# Patient Record
Sex: FEMALE | Race: WHITE | NOT HISPANIC OR LATINO | Employment: OTHER | URBAN - METROPOLITAN AREA
[De-identification: names, ages, dates, MRNs, and addresses within clinical notes are randomized per-mention and may not be internally consistent; named-entity substitution may affect disease eponyms.]

---

## 2017-01-06 ENCOUNTER — RADIANT APPOINTMENT (OUTPATIENT)
Dept: GENERAL RADIOLOGY | Facility: CLINIC | Age: 72
End: 2017-01-06
Payer: COMMERCIAL

## 2017-01-06 ENCOUNTER — OFFICE VISIT (OUTPATIENT)
Dept: FAMILY MEDICINE | Facility: CLINIC | Age: 72
End: 2017-01-06
Payer: COMMERCIAL

## 2017-01-06 VITALS
SYSTOLIC BLOOD PRESSURE: 132 MMHG | RESPIRATION RATE: 16 BRPM | HEART RATE: 88 BPM | TEMPERATURE: 98.4 F | BODY MASS INDEX: 33.29 KG/M2 | HEIGHT: 64 IN | DIASTOLIC BLOOD PRESSURE: 74 MMHG | OXYGEN SATURATION: 94 % | WEIGHT: 195 LBS

## 2017-01-06 DIAGNOSIS — J18.9 PNEUMONIA OF LEFT LOWER LOBE DUE TO INFECTIOUS ORGANISM: Primary | ICD-10-CM

## 2017-01-06 DIAGNOSIS — R05.9 COUGH: ICD-10-CM

## 2017-01-06 PROCEDURE — 99214 OFFICE O/P EST MOD 30 MIN: CPT | Performed by: PHYSICIAN ASSISTANT

## 2017-01-06 PROCEDURE — 71020 XR CHEST 2 VW: CPT

## 2017-01-06 RX ORDER — BENZONATATE 200 MG/1
200 CAPSULE ORAL 3 TIMES DAILY PRN
Qty: 21 CAPSULE | Refills: 0 | Status: SHIPPED | OUTPATIENT
Start: 2017-01-06 | End: 2017-01-17

## 2017-01-06 RX ORDER — AZITHROMYCIN 250 MG/1
TABLET, FILM COATED ORAL
Qty: 6 TABLET | Refills: 0 | Status: SHIPPED | OUTPATIENT
Start: 2017-01-06 | End: 2017-01-17

## 2017-01-06 RX ORDER — ALBUTEROL SULFATE 90 UG/1
2 AEROSOL, METERED RESPIRATORY (INHALATION) EVERY 6 HOURS PRN
Qty: 1 INHALER | Refills: 0 | Status: SHIPPED | OUTPATIENT
Start: 2017-01-06 | End: 2017-01-17

## 2017-01-06 RX ORDER — CODEINE PHOSPHATE AND GUAIFENESIN 10; 100 MG/5ML; MG/5ML
2 SOLUTION ORAL EVERY 4 HOURS PRN
Qty: 120 ML | Refills: 0 | Status: SHIPPED | OUTPATIENT
Start: 2017-01-06 | End: 2017-01-17

## 2017-01-06 NOTE — NURSING NOTE
"Chief Complaint   Patient presents with     URI       Initial /74 mmHg  Pulse 88  Temp(Src) 98.4  F (36.9  C)  Resp 16  Ht 5' 4\" (1.626 m)  Wt 195 lb (88.451 kg)  BMI 33.46 kg/m2  SpO2 94% Estimated body mass index is 33.46 kg/(m^2) as calculated from the following:    Height as of this encounter: 5' 4\" (1.626 m).    Weight as of this encounter: 195 lb (88.451 kg).  BP completed using cuff size: daylin. KASSANDRA Perla LPN      "

## 2017-01-06 NOTE — PATIENT INSTRUCTIONS
Treating Pneumonia  Pneumonia is an infection of one or both of the lungs. Pneumonia:    Is usually caused by a virus    Can be very serious, especially in infants, young children, and older adults. And also in those with other long-term health problems or weakened immune systems    Is sometimes treated at home and sometimes in the hospital    Antibiotic Medications  Antibiotics may be prescribed or administered for pneumonia caused by bacteria. They may be pills or oral medications, or shots or injections. Or they may be given intravenously (IV) or into the vein. If you are taking oral medications at home:    Fill your prescription and start taking your medication as soon as you can.    You will likely start to feel better in a day or two, but don t stop taking the antibiotic.    Use a pill organizer to help you remember to take your medication.    Let your health care provider know if you have side effects.    Take your medication exactly as directed on the label. Talk to your pharmacist if you have any questions.  Antiviral Medications  Antiviral medication may be prescribed or given for pneumonia cause by a virus. For example, antiviral medication may be prescribed for pneumonia caused by the flu virus. Antibiotics do not work against viruses. If you are taking antiviral medication at home:    Fill your prescription and start taking your medication as soon as you can.    Talk with your provider or pharmacist about possible side effects.    Take the medication exactly as instructed.  To Relieve Symptoms  There are many medications that can help relieve symptoms of pneumonia. Some are prescription and some are over-the-counter.  Your health care provider may recommend:    Acetaminophen or ibuprofen to lower your fever and to lessen headache or other pain    Cough medication to loosen mucus or to reduce coughing  Make sure you check with your health care provider or pharmacist before taking any over-the-counter  medications.  Special Treatments  If you are hospitalized for pneumonia, you may have other therapies, including:    Inhaled medications to help with breathing or chest congestion    Supplemental oxygen to increase low oxygen levels  Drink Fluids and Eat Healthy  You should eat healthy to help your body fight the infection and drink a lot of fluids to replace fluids lost from fever and to help loosen mucus in the chest.    Diet. Make health food choices, including fruits and vegetables, lean meats and other proteins, 100% whole grain and low- or no-fat dairy products.    Fluids. Drinks at least 6-8 tall glasses a day. Water and 100% fruit or vegetable juice are best.  Get Plenty of Rest and Sleep  You may be more tired than usual for a while. It is important to get enough sleep at night. And, it's important to rest during the day. Talk with your health care provider if coughing or other symptoms are interfering with your sleep.  Preventing the Spread of Germs  The best thing you can do to prevent spreading germs is to wash your hands often. You should:    Rub your hand with soap and water for 20 to 30 seconds.    Clean in between your fingers, the backs of your hands, and around your nails.    Dry your hands on a separate towel or use paper towels.  You should also:    Keep alcohol-based hand  nearby.    Make sure you also clean surfaces that you touch. Use a product that kills all types of germs.    Stay away from others until you are feeling better.  When to Call Your Health Care Provider  Call your health care provider if you have any of these:    Symptoms get worse    Fever continues    Shortness of breath gets worse    Increased mucus or mucus that is darker in color    Coughing gets worse    Lips or fingers are bluish in color    Side effects from your medication     [id number] [employer]. [address] [place] [address], [place] [address]. All rights reserved. This information is not intended as  a substitute for professional medical care. Always follow your healthcare professional's instructions.

## 2017-01-06 NOTE — PROGRESS NOTES
SUBJECTIVE:                                                    Kavitha Headley is a 71 year old female who presents to clinic today for the following health issues:      Acute Illness   Acute illness concerns: uri  Onset: 1 week     Fever: YES- not sure - low grade    Chills/Sweats: YES- sweaty    Headache (location?): YES    Sinus Pressure:YES    Conjunctivitis:  YES- pressure    Ear Pain: no    Rhinorrhea: no    Congestion: YES    Sore Throat: no     Cough: YES-productive of green sputum    Wheeze: YES    Decreased Appetite: YES    Nausea: no    Vomiting: no    Diarrhea:  no    Dysuria/Freq.: no    Fatigue/Achiness: no    Sick/Strep Exposure: no     Therapies Tried and outcome: otc decongestant           -------------------------------------    Problem list and histories reviewed & adjusted, as indicated.  Additional history: as documented    Patient Active Problem List   Diagnosis     Hyperlipidemia LDL goal <130     Spinal stenosis     Chronic insomnia     Alcohol abuse     CKD (chronic kidney disease) stage 3, GFR 30-59 ml/min     Hip joint replacement status     Knee joint replacement status     Chronic pain syndrome     Bariatric surgery status     Personal history of urinary calculi     Macrocytic anemia     Anxiety     Aortic stenosis     Left-sided low back pain without sciatica     ACP (advance care planning)     PAC (premature atrial contraction)     Gastroesophageal reflux disease, esophagitis presence not specified     Controlled substance agreement signed     Seasonal affective disorder (H)     HTN, goal below 140/90     Past Surgical History   Procedure Laterality Date     Hysterectomy vaginal, bilateral salpingo-oopherectomy, combined  1998     due to myoma and bleeding     C gastric bypass,obese<100cm [id number]  1996     Laparotomy, lysis adhesions, combined  3/2004     lysis adhesions, ventral hernia repair, appendectomy incidentally     Appendectomy  3/2004     incidental     Joint replacement,  hip rt/lt  4/2004     right total hip arthroplasty     C total knee arthroplasty  12/2005     left      Cystoscopy, lithotripsy, combined  6/2006     Left extracorporeal shock wave lithotripsy, cystoscopy, left ureteral stent placement.     Cystoscopy, remove stent(s), combined  7/2006     Cystoscopy, removal of left ureteral stent, retrograde pyelography, flexible and rigid ureteroscopy and holmium laser lithotripsy, basket removal of stone fragments, ureteral stent placement.      C mediastinoscopy w or wo biopsy  2/2008     Videomediastinoscopy and, for mediastinal adenopathy -reactive lymphoid hyperplasia     Lymph node biopsy  4/2008     right axillary, reactive follicular and paracortical hyperplasia.     Carpal tunnel release rt/lt  10/2010     Carpometacarpal excisional arthroplasty with a fascial autograft and APL suspension sling (36851). 2. Left thumb metacarpophalangeal joint fusion with autologous bone graft ([id number]). 3. Left endoscopic carpal tunnel release      Cholecystectomy, laporoscopic  11/2010     Cholecystectomy, Laparoscopic     C repair of rectocele  3/2012     Hernia repair  4/2012     bilateral augmentation mastopexy, ventral hernia repair, and medial thigh liposuction on 04/06/2012.      Mammoplasty augmentation bilateral  4/2012     Revise reconstructed breast  6/7/2012     Left breast capsulotomy.      Cystocele repair  11/2012     davinci laparoscopic sacrocolpopexy, enterocele repair, lysis of adhesions, placement of retropubic mid urethral sling, cystoscopy     Colonoscopy N/A 9/8/2016     Procedure: COMBINED COLONOSCOPY, SINGLE OR MULTIPLE BIOPSY/POLYPECTOMY BY BIOPSY;  Surgeon: Moe Barlow MD;  Location:  GI       Social History   Substance Use Topics     Smoking status: Never Smoker      Smokeless tobacco: Never Used     Alcohol Use: 0.0 oz/week     0 Standard drinks or equivalent per week      Comment: 2-3 drinks per day     Family History   Problem Relation Age of Onset      Substance Abuse Father      CANCER Father      throat and lung mets     DIABETES No family hx of      Coronary Artery Disease No family hx of      CEREBROVASCULAR DISEASE No family hx of          Current Outpatient Prescriptions   Medication Sig Dispense Refill     azithromycin (ZITHROMAX) 250 MG tablet Two tablets first day, then one tablet daily for four days. 6 tablet 0     albuterol (PROAIR HFA/PROVENTIL HFA/VENTOLIN HFA) 108 (90 BASE) MCG/ACT Inhaler Inhale 2 puffs into the lungs every 6 hours as needed for shortness of breath / dyspnea or wheezing 1 Inhaler 0     guaiFENesin-codeine (ROBITUSSIN AC) 100-10 MG/5ML SOLN solution Take 10 mLs by mouth every 4 hours as needed for cough 120 mL 0     benzonatate (TESSALON) 200 MG capsule Take 1 capsule (200 mg) by mouth 3 times daily as needed for cough 21 capsule 0     temazepam (RESTORIL) 15 MG capsule TAKE 1 CAPSULES BY MOUTH AT BETIME AS NEEDED TO SLEEP. MUST LAST 30 DAYS. 30 capsule 5     mirtazapine (REMERON) 45 MG tablet Take 1 tablet (45 mg) by mouth At Bedtime 90 tablet 1     traMADol (ULTRAM) 50 MG tablet TAKE 1 TABLET BY MOUTH TWICE DAILY AS NEEDED FOR MODERATE PAIN 60 tablet 3     naproxen (NAPROSYN) 500 MG tablet Take 1 tablet (500 mg) by mouth 2 times daily as needed for moderate pain 90 tablet 1     metoprolol (TOPROL XL) 25 MG 24 hr tablet Take 1 tablet (25 mg) by mouth daily 90 tablet 3     atorvastatin (LIPITOR) 40 MG tablet Take 1 tablet (40 mg) by mouth daily 90 tablet 1     gabapentin (NEURONTIN) 300 MG capsule Take 1 capsule (300 mg) by mouth 2 times daily 180 capsule 1     traZODone (DESYREL) 100 MG tablet Take 1 tablet (100 mg) by mouth At Bedtime 90 tablet 1     Multiple Vitamins-Minerals (CENTRUM SILVER) per tablet Take 1 tablet by mouth daily       aspirin 81 MG tablet Take 81 mg by mouth daily       valACYclovir (VALTREX) 1000 mg tablet 2 tabs every 12 hrs x 1 day only for outbreaks of cold sores 8 tablet 3     Allergies   Allergen  "Reactions     Bactrim [Sulfamethoxazole W/Trimethoprim] Hives     Codeine Itching     NAUSEA     Morphine Itching     NAUSEA     Labs reviewed in EPIC  Problem list, Medication list, Allergies, and Medical/Social/Surgical histories reviewed in [facility] and updated as appropriate.    Social History     Social History     Marital Status:      Spouse Name: Mt     Number of Children: 4     Years of Education: 18     Occupational History     nurse            Social History Main Topics     Smoking status: Never Smoker      Smokeless tobacco: Never Used     Alcohol Use: 0.0 oz/week     0 Standard drinks or equivalent per week      Comment: 2-3 drinks per day     Drug Use: No     Sexual Activity:     Partners: Male     Other Topics Concern     Blood Transfusions No     Caffeine Concern Yes     1-2 cups per day      Occupational Exposure Yes     blood     Hobby Hazards No     Sleep Concern Yes     Stress Concern Yes     Weight Concern Yes     gastric  byepass     Special Diet No     Back Care No     Exercise Yes     walk, swin     Bike Helmet No     Seat Belt Yes     Self-Exams Yes     Social History Narrative       10 point review of systems negative other than symptoms noted above.   Constitutional, HEENT, CV, pulmonary, GI, , MS, Endo, Psych systems are all negative, except as otherwise noted.       OBJECTIVE:  /74 mmHg  Pulse 88  Temp(Src) 98.4  F (36.9  C)  Resp 16  Ht 5' 4\" (1.626 m)  Wt 195 lb (88.451 kg)  BMI 33.46 kg/m2  SpO2 94%  CONSTITUTIONAL: Alert, well-nourished, well-groomed, NAD  RESP: Very noisy lung exam in all fields; LLL with crackles.   CV: HRRR, normal S1, S2. No MRG. No peripheral edema.  DERM: No rashes or suspicious lesions  Head: Normocephalic, atraumatic.  Eyes: Conjunctiva clear, non icteric. PERRLA.  Ears: External ears and TMs normal BL.  Nose: Septum midline, nasal mucosa pink with clear drainage.   Mouth / Throat: Normal dentition.  No oral lesions. Pharynx non " erythematous, tonsils without hypertrophy.  Neck: Supple, no enlarged LN, trachea midline.      Diagnostic Tests:  CHEST TWO VIEWS   1/6/2017 4:35 PM       HISTORY: Cough.     COMPARISON: 8/2/2016.                                                                       IMPRESSION: Cardiomegaly, unchanged. No evidence for congestive heart  failure or infiltrate. No pleural effusions. Minimal degenerative  changes noted in spine.     MARKOS KING MD    ASSESSMENT/PLAN:  (J18.9) Pneumonia of left lower lobe due to infectious organism  (primary encounter diagnosis)  Comment: Reviewed xray with patient. No obvious infiltrate, but based on patient's lung exam and presentation, I will treat her for pneumonia.   CURB-65 score = 1, based on patient age - appropriate for outpatient tx.   However, if any worsening or concerns, she will be seen in UC or ER over the weekend.   Plan: azithromycin (ZITHROMAX) 250 MG tablet,         albuterol (PROAIR HFA/PROVENTIL HFA/VENTOLIN         HFA) 108 (90 BASE) MCG/ACT Inhaler,         guaiFENesin-codeine (ROBITUSSIN AC) 100-10         MG/5ML SOLN solution, benzonatate (TESSALON)         200 MG capsule            (R05) Cough  Comment:   Plan: XR Chest 2 Views             FOLLOW-UP: Routinely and sooner as needed.  The patient agrees with this assessment and plan and agrees to call or return to the clinic with any questions or concerns or if their condition worsens.    NITZA Moran, PACastilloC  [facility]

## 2017-01-06 NOTE — MR AVS SNAPSHOT
After Visit Summary   1/6/2017    Kavitha Headley    MRN: [id number]           Patient Information     Date Of Birth          1945        Visit Information        Provider Department      1/6/2017 3:40 PM Yoon Peterson PA-C [facility] [place]        Today's Diagnoses     Cough    -  1     Pneumonia of left lower lobe due to infectious organism           Care Instructions      Treating Pneumonia  Pneumonia is an infection of one or both of the lungs. Pneumonia:    Is usually caused by a virus    Can be very serious, especially in infants, young children, and older adults. And also in those with other long-term health problems or weakened immune systems    Is sometimes treated at home and sometimes in the hospital    Antibiotic Medications  Antibiotics may be prescribed or administered for pneumonia caused by bacteria. They may be pills or oral medications, or shots or injections. Or they may be given intravenously (IV) or into the vein. If you are taking oral medications at home:    Fill your prescription and start taking your medication as soon as you can.    You will likely start to feel better in a day or two, but don t stop taking the antibiotic.    Use a pill organizer to help you remember to take your medication.    Let your health care provider know if you have side effects.    Take your medication exactly as directed on the label. Talk to your pharmacist if you have any questions.  Antiviral Medications  Antiviral medication may be prescribed or given for pneumonia cause by a virus. For example, antiviral medication may be prescribed for pneumonia caused by the flu virus. Antibiotics do not work against viruses. If you are taking antiviral medication at home:    Fill your prescription and start taking your medication as soon as you can.    Talk with your provider or pharmacist about possible side effects.    Take the medication exactly as instructed.  To Relieve  Symptoms  There are many medications that can help relieve symptoms of pneumonia. Some are prescription and some are over-the-counter.  Your health care provider may recommend:    Acetaminophen or ibuprofen to lower your fever and to lessen headache or other pain    Cough medication to loosen mucus or to reduce coughing  Make sure you check with your health care provider or pharmacist before taking any over-the-counter medications.  Special Treatments  If you are hospitalized for pneumonia, you may have other therapies, including:    Inhaled medications to help with breathing or chest congestion    Supplemental oxygen to increase low oxygen levels  Drink Fluids and Eat Healthy  You should eat healthy to help your body fight the infection and drink a lot of fluids to replace fluids lost from fever and to help loosen mucus in the chest.    Diet. Make health food choices, including fruits and vegetables, lean meats and other proteins, 100% whole grain and low- or no-fat dairy products.    Fluids. Drinks at least 6-8 tall glasses a day. Water and 100% fruit or vegetable juice are best.  Get Plenty of Rest and Sleep  You may be more tired than usual for a while. It is important to get enough sleep at night. And, it's important to rest during the day. Talk with your health care provider if coughing or other symptoms are interfering with your sleep.  Preventing the Spread of Germs  The best thing you can do to prevent spreading germs is to wash your hands often. You should:    Rub your hand with soap and water for 20 to 30 seconds.    Clean in between your fingers, the backs of your hands, and around your nails.    Dry your hands on a separate towel or use paper towels.  You should also:    Keep alcohol-based hand  nearby.    Make sure you also clean surfaces that you touch. Use a product that kills all types of germs.    Stay away from others until you are feeling better.  When to Call Your Health Care  Provider  Call your health care provider if you have any of these:    Symptoms get worse    Fever continues    Shortness of breath gets worse    Increased mucus or mucus that is darker in color    Coughing gets worse    Lips or fingers are bluish in color    Side effects from your medication     5094-5794 [employer]. [address] [place] [address] 18124. All rights reserved. This information is not intended as a substitute for professional medical care. Always follow your healthcare professional's instructions.              Follow-ups after your visit        Who to contact     If you have questions or need follow up information about today's clinic visit or your schedule please contact [facility] [place] directly at [phone].  Normal or non-critical lab and imaging results will be communicated to you by MyChart, letter or phone within 4 business days after the clinic has received the results. If you do not hear from us within 7 days, please contact the clinic through [employer] or phone. If you have a critical or abnormal lab result, we will notify you by phone as soon as possible.  Submit refill requests through [employer] or call your pharmacy and they will forward the refill request to us. Please allow 3 business days for your refill to be completed.          Additional Information About Your Visit        [employer] Information     [employer] gives you secure access to your electronic health record. If you see a primary care provider, you can also send messages to your care team and make appointments. If you have questions, please call your primary care clinic.  If you do not have a primary care provider, please call [phone] and they will assist you.        Care EveryWhere ID     This is your Care EveryWhere ID. This could be used by other organizations to access your [place] medical records  [id number]        Your Vitals Were     Pulse Temperature Respirations Height BMI (Body Mass  "Index) Pulse Oximetry    88 98.4  F (36.9  C) 16 5' 4\" (1.626 m) 33.46 kg/m2 94%       Blood Pressure from Last 3 Encounters:   01/06/17 132/74   12/12/16 125/82   10/06/16 116/64    Weight from Last 3 Encounters:   01/06/17 195 lb (88.451 kg)   12/12/16 188 lb (85.276 kg)   10/06/16 180 lb (81.647 kg)                 Today's Medication Changes          These changes are accurate as of: 1/6/17  4:22 PM.  If you have any questions, ask your nurse or doctor.               Start taking these medicines.        Dose/Directions    albuterol 108 (90 BASE) MCG/ACT Inhaler   Commonly known as:  PROAIR HFA/PROVENTIL HFA/VENTOLIN HFA   Used for:  Pneumonia of left lower lobe due to infectious organism   Started by:  Yoon Peterson PA-C        Dose:  2 puff   Inhale 2 puffs into the lungs every 6 hours as needed for shortness of breath / dyspnea or wheezing   Quantity:  1 Inhaler   Refills:  0       azithromycin 250 MG tablet   Commonly known as:  ZITHROMAX   Used for:  Pneumonia of left lower lobe due to infectious organism   Started by:  Yoon Peterson PA-C        Two tablets first day, then one tablet daily for four days.   Quantity:  6 tablet   Refills:  0       benzonatate 200 MG capsule   Commonly known as:  TESSALON   Used for:  Pneumonia of left lower lobe due to infectious organism   Started by:  Yoon Peterson PA-C        Dose:  200 mg   Take 1 capsule (200 mg) by mouth 3 times daily as needed for cough   Quantity:  21 capsule   Refills:  0       guaiFENesin-codeine 100-10 MG/5ML Soln solution   Commonly known as:  ROBITUSSIN AC   Used for:  Pneumonia of left lower lobe due to infectious organism   Started by:  Yoon Peterson PA-C        Dose:  2 tsp.   Take 10 mLs by mouth every 4 hours as needed for cough   Quantity:  120 mL   Refills:  0            Where to get your medicines      These medications were sent to [facility] [place] - [address]  [address], [place] [address]     Phone:  [phone]    - albuterol 108 (90 BASE) MCG/ACT Inhaler  - azithromycin 250 MG tablet  - benzonatate 200 MG capsule      Some of these will need a paper prescription and others can be bought over the counter.  Ask your nurse if you have questions.     Bring a paper prescription for each of these medications    - guaiFENesin-codeine 100-10 MG/5ML Soln solution             Primary Care Provider Office Phone # Fax #    Alison ePna [phone]       [address]  [place] [address]        Thank you!     Thank you for choosing [facility]  for your care. Our goal is always to provide you with excellent care. Hearing back from our patients is one way we can continue to improve our services. Please take a few minutes to complete the written survey that you may receive in the mail after your visit with us. Thank you!             Your Updated Medication List - Protect others around you: Learn how to safely use, store and throw away your medicines at www.disposemymeds.org.          This list is accurate as of: 1/6/17  4:22 PM.  Always use your most recent med list.                   Brand Name Dispense Instructions for use    albuterol 108 (90 BASE) MCG/ACT Inhaler    PROAIR HFA/PROVENTIL HFA/VENTOLIN HFA    1 Inhaler    Inhale 2 puffs into the lungs every 6 hours as needed for shortness of breath / dyspnea or wheezing       aspirin 81 MG tablet      Take 81 mg by mouth daily       atorvastatin 40 MG tablet    LIPITOR    90 tablet    Take 1 tablet (40 mg) by mouth daily       azithromycin 250 MG tablet    ZITHROMAX    6 tablet    Two tablets first day, then one tablet daily for four days.       benzonatate 200 MG capsule    TESSALON    21 capsule    Take 1 capsule (200 mg) by mouth 3 times daily as needed for cough       CENTRUM SILVER per tablet      Take 1 tablet  by mouth daily       gabapentin 300 MG capsule    NEURONTIN    180 capsule    Take 1 capsule (300 mg) by mouth 2 times daily       guaiFENesin-codeine 100-10 MG/5ML Soln solution    ROBITUSSIN AC    120 mL    Take 10 mLs by mouth every 4 hours as needed for cough       metoprolol 25 MG 24 hr tablet    TOPROL XL    90 tablet    Take 1 tablet (25 mg) by mouth daily       mirtazapine 45 MG tablet    REMERON    90 tablet    Take 1 tablet (45 mg) by mouth At Bedtime       naproxen 500 MG tablet    NAPROSYN    90 tablet    Take 1 tablet (500 mg) by mouth 2 times daily as needed for moderate pain       temazepam 15 MG capsule    RESTORIL    30 capsule    TAKE 1 CAPSULES BY MOUTH AT BETIME AS NEEDED TO SLEEP. MUST LAST 30 DAYS.       traMADol 50 MG tablet    ULTRAM    60 tablet    TAKE 1 TABLET BY MOUTH TWICE DAILY AS NEEDED FOR MODERATE PAIN       traZODone 100 MG tablet    DESYREL    90 tablet    Take 1 tablet (100 mg) by mouth At Bedtime       valACYclovir 1000 mg tablet    VALTREX    8 tablet    2 tabs every 12 hrs x 1 day only for outbreaks of cold sores

## 2017-01-17 ENCOUNTER — OFFICE VISIT (OUTPATIENT)
Dept: FAMILY MEDICINE | Facility: CLINIC | Age: 72
End: 2017-01-17
Payer: COMMERCIAL

## 2017-01-17 VITALS
HEART RATE: 82 BPM | WEIGHT: 194 LBS | DIASTOLIC BLOOD PRESSURE: 78 MMHG | HEIGHT: 64 IN | BODY MASS INDEX: 33.12 KG/M2 | SYSTOLIC BLOOD PRESSURE: 118 MMHG | TEMPERATURE: 99.3 F | OXYGEN SATURATION: 97 %

## 2017-01-17 DIAGNOSIS — Z12.31 ENCOUNTER FOR SCREENING MAMMOGRAM FOR BREAST CANCER: ICD-10-CM

## 2017-01-17 DIAGNOSIS — R05.9 COUGH: Primary | ICD-10-CM

## 2017-01-17 PROCEDURE — 99213 OFFICE O/P EST LOW 20 MIN: CPT | Performed by: FAMILY MEDICINE

## 2017-01-17 RX ORDER — CODEINE PHOSPHATE AND GUAIFENESIN 10; 100 MG/5ML; MG/5ML
2 SOLUTION ORAL EVERY 4 HOURS PRN
Qty: 120 ML | Refills: 0 | Status: SHIPPED | OUTPATIENT
Start: 2017-01-17 | End: 2017-05-09

## 2017-01-17 RX ORDER — METHYLPREDNISOLONE 4 MG
TABLET, DOSE PACK ORAL
Qty: 21 TABLET | Refills: 0 | Status: SHIPPED | OUTPATIENT
Start: 2017-01-17 | End: 2017-05-09

## 2017-01-17 NOTE — NURSING NOTE
"Chief Complaint   Patient presents with     URI       Initial /78 mmHg  Pulse 82  Temp(Src) 99.3  F (37.4  C) (Tympanic)  Ht 5' 4\" (1.626 m)  Wt 194 lb (87.998 kg)  BMI 33.28 kg/m2  SpO2 97% Estimated body mass index is 33.28 kg/(m^2) as calculated from the following:    Height as of this encounter: 5' 4\" (1.626 m).    Weight as of this encounter: 194 lb (87.998 kg)..  BP completed using cuff size: regular Sarah Severson, CMA  "

## 2017-01-17 NOTE — PROGRESS NOTES
SUBJECTIVE:                                                    Kavitha Headley is a 71 year old female who presents to clinic today for the following health issues:      Acute Illness   Acute illness concerns: Cough  Onset: 1 month     Fever: no     Chills/Sweats: YES    Headache (location?): no     Sinus Pressure:no    Conjunctivitis:  no    Ear Pain: YES- pressure/plugged feeling    Rhinorrhea: YES    Congestion: YES    Sore Throat: no      Cough: YES    Wheeze: YES    Decreased Appetite: YES    Nausea: no     Vomiting: no     Diarrhea:  no     Dysuria/Freq.: no     Fatigue/Achiness: YES    Sick/Strep Exposure: no      Therapies Tried and outcome: inhaler,  Z-pack, OTC meds. She was seen on 1/6/17. Some improvement   Coughs a lot of night.         Problem list and histories reviewed & adjusted, as indicated.  Additional history: as documented    Patient Active Problem List   Diagnosis     Hyperlipidemia LDL goal <130     Spinal stenosis     Chronic insomnia     Alcohol abuse     CKD (chronic kidney disease) stage 3, GFR 30-59 ml/min     Hip joint replacement status     Knee joint replacement status     Chronic pain syndrome     Bariatric surgery status     Personal history of urinary calculi     Macrocytic anemia     Anxiety     Aortic stenosis     Left-sided low back pain without sciatica     ACP (advance care planning)     PAC (premature atrial contraction)     Gastroesophageal reflux disease, esophagitis presence not specified     Controlled substance agreement signed     Seasonal affective disorder (H)     HTN, goal below 140/90     Past Surgical History   Procedure Laterality Date     Hysterectomy vaginal, bilateral salpingo-oopherectomy, combined  1998     due to myoma and bleeding     C gastric bypass,obese<100cm kajal-[id number]  1996     Laparotomy, lysis adhesions, combined  3/2004     lysis adhesions, ventral hernia repair, appendectomy incidentally     Appendectomy  3/2004     incidental     Joint  replacement, hip rt/lt  4/2004     right total hip arthroplasty     C total knee arthroplasty  12/2005     left      Cystoscopy, lithotripsy, combined  6/2006     Left extracorporeal shock wave lithotripsy, cystoscopy, left ureteral stent placement.     Cystoscopy, remove stent(s), combined  7/2006     Cystoscopy, removal of left ureteral stent, retrograde pyelography, flexible and rigid ureteroscopy and holmium laser lithotripsy, basket removal of stone fragments, ureteral stent placement.      C mediastinoscopy w or wo biopsy  2/2008     Videomediastinoscopy and, for mediastinal adenopathy -reactive lymphoid hyperplasia     Lymph node biopsy  4/2008     right axillary, reactive follicular and paracortical hyperplasia.     Carpal tunnel release rt/lt  10/2010     Carpometacarpal excisional arthroplasty with a fascial autograft and APL suspension sling (37355). 2. Left thumb metacarpophalangeal joint fusion with autologous bone graft (27590). 3. Left endoscopic carpal tunnel release      Cholecystectomy, laporoscopic  11/2010     Cholecystectomy, Laparoscopic     C repair of rectocele  3/2012     Hernia repair  4/2012     bilateral augmentation mastopexy, ventral hernia repair, and medial thigh liposuction on 04/06/2012.      Mammoplasty augmentation bilateral  4/2012     Revise reconstructed breast  6/7/2012     Left breast capsulotomy.      Cystocele repair  11/2012     davinci laparoscopic sacrocolpopexy, enterocele repair, lysis of adhesions, placement of retropubic mid urethral sling, cystoscopy     Colonoscopy N/A 9/8/2016     Procedure: COMBINED COLONOSCOPY, SINGLE OR MULTIPLE BIOPSY/POLYPECTOMY BY BIOPSY;  Surgeon: Moe Barlow MD;  Location:  GI       Social History   Substance Use Topics     Smoking status: Never Smoker      Smokeless tobacco: Never Used     Alcohol Use: 0.0 oz/week     0 Standard drinks or equivalent per week      Comment: 2-3 drinks per day     Family History   Problem Relation Age  of Onset     Substance Abuse Father      CANCER Father      throat and lung mets     DIABETES No family hx of      Coronary Artery Disease No family hx of      CEREBROVASCULAR DISEASE No family hx of          Current Outpatient Prescriptions   Medication Sig Dispense Refill     methylPREDNISolone (MEDROL DOSEPAK) 4 MG tablet Follow package instructions 21 tablet 0     guaiFENesin-codeine (ROBITUSSIN AC) 100-10 MG/5ML SOLN solution Take 10 mLs by mouth every 4 hours as needed for cough 120 mL 0     temazepam (RESTORIL) 15 MG capsule TAKE 1 CAPSULES BY MOUTH AT BETIME AS NEEDED TO SLEEP. MUST LAST 30 DAYS. 30 capsule 5     mirtazapine (REMERON) 45 MG tablet Take 1 tablet (45 mg) by mouth At Bedtime 90 tablet 1     traMADol (ULTRAM) 50 MG tablet TAKE 1 TABLET BY MOUTH TWICE DAILY AS NEEDED FOR MODERATE PAIN 60 tablet 3     naproxen (NAPROSYN) 500 MG tablet Take 1 tablet (500 mg) by mouth 2 times daily as needed for moderate pain 90 tablet 1     metoprolol (TOPROL XL) 25 MG 24 hr tablet Take 1 tablet (25 mg) by mouth daily 90 tablet 3     atorvastatin (LIPITOR) 40 MG tablet Take 1 tablet (40 mg) by mouth daily 90 tablet 1     gabapentin (NEURONTIN) 300 MG capsule Take 1 capsule (300 mg) by mouth 2 times daily 180 capsule 1     traZODone (DESYREL) 100 MG tablet Take 1 tablet (100 mg) by mouth At Bedtime 90 tablet 1     Multiple Vitamins-Minerals (CENTRUM SILVER) per tablet Take 1 tablet by mouth daily       aspirin 81 MG tablet Take 81 mg by mouth daily       valACYclovir (VALTREX) 1000 mg tablet 2 tabs every 12 hrs x 1 day only for outbreaks of cold sores 8 tablet 3     Allergies   Allergen Reactions     Bactrim [Sulfamethoxazole W/Trimethoprim] Hives     Codeine Itching     NAUSEA     Morphine Itching     NAUSEA       ROS:  C: NEGATIVE for fever, chills, change in weight  INTEGUMENTARY/SKIN: NEGATIVE for worrisome rashes, moles or lesions  GI: NEGATIVE for nausea, abdominal pain, heartburn, or change in bowel  "habits    OBJECTIVE:                                                    /78 mmHg  Pulse 82  Temp(Src) 99.3  F (37.4  C) (Tympanic)  Ht 5' 4\" (1.626 m)  Wt 194 lb (87.998 kg)  BMI 33.28 kg/m2  SpO2 97%  Body mass index is 33.28 kg/(m^2).   GENERAL: healthy, alert, well nourished, well hydrated, no distress  HENT: ear canals- normal; TMs- normal; Nose- normal; Mouth- no ulcers, no lesions  NECK: no tenderness, no adenopathy, no asymmetry, no masses, no stiffness; thyroid- normal to palpation  RESP: lungs clear to auscultation - no rales, no rhonchi, no wheezes  CV: regular rates and rhythm, normal S1 S2, no S3 or S4 and no murmur, no click or rub -         ASSESSMENT/PLAN:                                                      1. Cough  No active signs of infection. No antibiotics indicated. Recommended trial of PO steroids to reduce inflammation.   Cough medication ordered. Instructed to stay well hydrated.   Tessalon was too pricey, so she never got it.   Albuterol inhaler is hard to use and she ran out already.  Z-pack course done already.     - methylPREDNISolone (MEDROL DOSEPAK) 4 MG tablet; Follow package instructions  Dispense: 21 tablet; Refill: 0  - guaiFENesin-codeine (ROBITUSSIN AC) 100-10 MG/5ML SOLN solution; Take 10 mLs by mouth every 4 hours as needed for cough  Dispense: 120 mL; Refill: 0    2. Encounter for screening mammogram for breast cancer  Screening mammogram ordered  - *MA Screening Digital Bilateral; Future      Follow up with Provider - if not improving or if any worsening is noted.      Alison Pena MD  [facility] [place]  "

## 2017-01-17 NOTE — MR AVS SNAPSHOT
After Visit Summary   1/17/2017    Kavitha Headley    MRN: [id number]           Patient Information     Date Of Birth          1945        Visit Information        Provider Department      1/17/2017 3:20 PM Alison Pena MD [facility] [place]        Today's Diagnoses     Cough    -  1     Encounter for screening mammogram for breast cancer            Follow-ups after your visit        Follow-up notes from your care team     Return in about 4 months (around 5/17/2017) for Physical Exam.      Future tests that were ordered for you today     Open Future Orders        Priority Expected Expires Ordered    *MA Screening Digital Bilateral Routine  1/17/2018 1/17/2017            Who to contact     If you have questions or need follow up information about today's clinic visit or your schedule please contact [facility] [place] directly at [phone].  Normal or non-critical lab and imaging results will be communicated to you by MyChart, letter or phone within 4 business days after the clinic has received the results. If you do not hear from us within 7 days, please contact the clinic through MyChart or phone. If you have a critical or abnormal lab result, we will notify you by phone as soon as possible.  Submit refill requests through [employer] or call your pharmacy and they will forward the refill request to us. Please allow 3 business days for your refill to be completed.          Additional Information About Your Visit        MyChart Information     [employer] gives you secure access to your electronic health record. If you see a primary care provider, you can also send messages to your care team and make appointments. If you have questions, please call your primary care clinic.  If you do not have a primary care provider, please call [phone] and they will assist you.        Care EveryWhere ID     This is your Care EveryWhere ID. This could be used by other organizations to  "access your [facility] medical records  [id number]        Your Vitals Were     Pulse Temperature Height BMI (Body Mass Index) Pulse Oximetry       82 99.3  F (37.4  C) (Tympanic) 5' 4\" (1.626 m) 33.28 kg/m2 97%        Blood Pressure from Last 3 Encounters:   01/17/17 118/78   01/06/17 132/74   12/12/16 125/82    Weight from Last 3 Encounters:   01/17/17 194 lb (87.998 kg)   01/06/17 195 lb (88.451 kg)   12/12/16 188 lb (85.276 kg)                 Today's Medication Changes          These changes are accurate as of: 1/17/17  4:01 PM.  If you have any questions, ask your nurse or doctor.               Start taking these medicines.        Dose/Directions    methylPREDNISolone 4 MG tablet   Commonly known as:  MEDROL DOSEPAK   Used for:  Cough   Started by:  Alison Pena MD        Follow package instructions   Quantity:  21 tablet   Refills:  0         Stop taking these medicines if you haven't already. Please contact your care team if you have questions.     albuterol 108 (90 BASE) MCG/ACT Inhaler   Commonly known as:  PROAIR HFA/PROVENTIL HFA/VENTOLIN HFA   Stopped by:  Alison Pena MD           benzonatate 200 MG capsule   Commonly known as:  TESSALON   Stopped by:  Alison Pena MD                Where to get your medicines      These medications were sent to [facility] [place] [address] [place] [address]     Phone:  [phone]    - methylPREDNISolone 4 MG tablet      Some of these will need a paper prescription and others can be bought over the counter.  Ask your nurse if you have questions.     Bring a paper prescription for each of these medications    - guaiFENesin-codeine 100-10 MG/5ML Soln solution             Primary Care Provider Office Phone # Fax #    Alison Pena [phone]       [address]  [place] [address]        Thank you!     Thank you for choosing [facility] [place]  for your care. Our goal is always to provide you with excellent care. Hearing back from our patients is one way we can continue to improve our services. Please take a few minutes to complete the written survey that you may receive in the mail after your visit with us. Thank you!             Your Updated Medication List - Protect others around you: Learn how to safely use, store and throw away your medicines at www.disposemymeds.org.          This list is accurate as of: 1/17/17  4:01 PM.  Always use your most recent med list.                   Brand Name Dispense Instructions for use    aspirin 81 MG tablet      Take 81 mg by mouth daily       atorvastatin 40 MG tablet    LIPITOR    90 tablet    Take 1 tablet (40 mg) by mouth daily       CENTRUM SILVER per tablet      Take 1 tablet by mouth daily       gabapentin 300 MG capsule    NEURONTIN    180 capsule    Take 1 capsule (300 mg) by mouth 2 times daily       guaiFENesin-codeine 100-10 MG/5ML Soln solution    ROBITUSSIN AC    120 mL    Take 10 mLs by mouth every 4 hours as needed for cough       methylPREDNISolone 4 MG tablet    MEDROL DOSEPAK    21 tablet    Follow package instructions       metoprolol 25 MG 24 hr tablet    TOPROL XL    90 tablet    Take 1 tablet (25 mg) by mouth daily       mirtazapine 45 MG tablet    REMERON    90 tablet    Take 1 tablet (45 mg) by mouth At Bedtime       naproxen 500 MG tablet    NAPROSYN    90 tablet    Take 1 tablet (500 mg) by mouth 2 times daily as needed for moderate pain       temazepam 15 MG capsule    RESTORIL    30 capsule    TAKE 1 CAPSULES BY MOUTH AT BETIME AS NEEDED TO SLEEP. MUST LAST 30 DAYS.       traMADol 50 MG tablet    ULTRAM    60 tablet    TAKE 1 TABLET BY MOUTH TWICE DAILY AS NEEDED FOR MODERATE PAIN       traZODone 100 MG tablet    DESYREL    90 tablet    Take 1 tablet (100 mg) by mouth At Bedtime       valACYclovir 1000 mg tablet    VALTREX    8 tablet    2 tabs every 12 hrs x 1 day  only for outbreaks of cold sores

## 2017-02-09 DIAGNOSIS — B00.9 HERPES SIMPLEX VIRUS INFECTION: Primary | ICD-10-CM

## 2017-02-09 RX ORDER — VALACYCLOVIR HYDROCHLORIDE 1 G/1
TABLET, FILM COATED ORAL
Qty: 8 TABLET | Refills: 0 | Status: SHIPPED | OUTPATIENT
Start: 2017-02-09 | End: 2017-05-09

## 2017-02-09 NOTE — TELEPHONE ENCOUNTER
Received an Escribe error- prescription did not go through-  Called [facility] and gave verbal order for valtrex.  Essence Rodas,WILMA  [facility]  [phone]

## 2017-02-09 NOTE — TELEPHONE ENCOUNTER
Valtrex for cold sore outbreak      Last Written Prescription Date: 7/2/15  Last Fill Quantity: 8, # refills: 3  Last Office Visit with [facility], [facility] or [facility] prescribing provider: 1/17/17        CREATININE   Date Value Ref Range Status   08/17/2016 0.80 0.52 - 1.04 mg/dL Final     appt and lab up to date. Refill request approved per [facility] protocol    Katharina Rock RN

## 2017-02-24 DIAGNOSIS — M54.50 CHRONIC LEFT-SIDED LOW BACK PAIN WITHOUT SCIATICA: ICD-10-CM

## 2017-02-24 DIAGNOSIS — G89.29 CHRONIC LEFT-SIDED LOW BACK PAIN WITHOUT SCIATICA: ICD-10-CM

## 2017-02-24 RX ORDER — GABAPENTIN 300 MG/1
300 CAPSULE ORAL 3 TIMES DAILY
Qty: 270 CAPSULE | Refills: 1 | Status: SHIPPED | OUTPATIENT
Start: 2017-02-24 | End: 2017-05-09

## 2017-02-24 NOTE — TELEPHONE ENCOUNTER
Patient called stating that she has been taking Gabapentin three times a day frequently instead of the twice daily as ordered    With taking the three times a day, has noted that pain is much better    Asking for new prescription for three times a day (only has three pills left), cannot get refill on current rx as written until 3/12.    rx pended, please edit    Triage:  Call patient at [phone] with response    WILMA Holguin

## 2017-04-20 DIAGNOSIS — I10 BENIGN HYPERTENSION: ICD-10-CM

## 2017-04-20 RX ORDER — FUROSEMIDE 20 MG
TABLET ORAL
Qty: 30 TABLET | Refills: 0 | Status: SHIPPED | OUTPATIENT
Start: 2017-04-20 | End: 2017-05-09

## 2017-04-20 NOTE — TELEPHONE ENCOUNTER
Please inquire if the patient is currently using the medication.  Please have her follow up for an annual physical exam with fasting labs. If she is running out of the medication, we can order 30 tablets.

## 2017-04-20 NOTE — TELEPHONE ENCOUNTER
Routing refill request to provider for review/approval because:  Labs out of range:  BRITTNEY Holguin RN

## 2017-04-20 NOTE — TELEPHONE ENCOUNTER
Spoke with patient, states she has been taking it every 3-4 days prn. Patient scheduled CPE for 5/4/17 and small refill given.   Mini Calero RN   [facility] - Triage

## 2017-05-04 ENCOUNTER — OFFICE VISIT (OUTPATIENT)
Dept: FAMILY MEDICINE | Facility: CLINIC | Age: 72
End: 2017-05-04

## 2017-05-04 DIAGNOSIS — Z53.9 ERRONEOUS ENCOUNTER--DISREGARD: Primary | ICD-10-CM

## 2017-05-04 NOTE — MR AVS SNAPSHOT
After Visit Summary   5/4/2017    Kavitha Headley    MRN: [id number]           Patient Information     Date Of Birth          1945        Visit Information        Provider Department      5/4/2017 1:00 PM Alison Pena MD [facility]        Today's Diagnoses     ERRONEOUS ENCOUNTER--DISREGARD    -  1       Follow-ups after your visit        Your next 10 appointments already scheduled     May 10, 2017 11:30 AM CDT   LAB with EC LAB   [facility]    [address] [facility] [id number] [phone]           OUTSIDE LABS:  Please include name of facility and Physician that is requesting outside labs be drawn.  Please indicate if labs are fasting or non-fasting on appt notes.  Be as specific as you can on which labs are being drawn.            May 12, 2017  1:15 PM CDT   MA SCREENING DIGITAL BILATERAL with SHBCMA1   [facility]    [address] [place] [address], [address]  [facility] [address] [phone]           Do not use any powder, lotion or deodorant under your arms or on your breast. If you do, we will ask you to remove it before your exam.  Wear comfortable, two-piece clothing.  If you have any allergies, tell your care team.  Bring any previous mammograms from other facilities or have them mailed to the breast center. This mammogram location, [facility], now offers 3D mammography. It doesn't replace a screening mammogram and can be done with a regular screening mammogram. It is optional and not all insurances will pay for it. 3D mammography is a special kind of mammogram that produces a three-dimensional image of the breast by using low dose-xrays. 3D allows the radiologist to see the breast tissue differently from 2D, which reduces the chance of repeat testing due to overlapping breast tissue. If you are interested in have a 3D  mammogram, please check with your insurance before you arrive for your exam. On the day of your exam you will be asked if you would like 3D imaging.            May 18, 2017  1:00 PM CDT   Ech Complete with ECECH1   [facility] [place] [facility]    [address] [facility]  [place] [id number] [phone]           1.  Please bring or wear a comfortable two-piece outfit. 2.  You may eat, drink and take your normal medicines. 3.  For any questions that cannot be answered, please contact the ordering physician            May 19, 2017  1:30 PM CDT   New Visit with Fam Sommer MD   [facility]    [address] [place], [place] [id number]  [facility] [address] [phone]              Future tests that were ordered for you today     Open Future Orders        Priority Expected Expires Ordered    Echocardiogram Complete Routine  5/9/2018 5/9/2017    MA Screen w Implants Digital Bilat Routine  5/9/2018 5/9/2017    Lipid Profile Routine  5/9/2018 5/9/2017    Comprehensive metabolic panel Routine  5/9/2018 5/9/2017            Who to contact     If you have questions or need follow up information about today's clinic visit or your schedule please contact [facility] directly at [phone].  Normal or non-critical lab and imaging results will be communicated to you by MyChart, letter or phone within 4 business days after the clinic has received the results. If you do not hear from us within 7 days, please contact the clinic through [employer] or phone. If you have a critical or abnormal lab result, we will notify you by phone as soon as possible.  Submit refill requests through [employer] or call your pharmacy and they will forward the refill request to us. Please allow 3 business days for your refill to be completed.          Additional Information About Your Visit        [employer] Information     [employer] gives  you secure access to your electronic health record. If you see a primary care provider, you can also send messages to your care team and make appointments. If you have questions, please call your primary care clinic.  If you do not have a primary care provider, please call [phone] and they will assist you.        Care EveryWhere ID     This is your Care EveryWhere ID. This could be used by other organizations to access your [facility] medical records  [id number]         Blood Pressure from Last 3 Encounters:   05/09/17 111/74   01/17/17 118/78   01/06/17 132/74    Weight from Last 3 Encounters:   05/09/17 187 lb 3.2 oz (84.9 kg)   01/17/17 194 lb (88 kg)   01/06/17 195 lb (88.5 kg)              Today, you had the following     No orders found for display       Primary Care Provider Office Phone # Fax #    Alison Pena [phone]       [facility] [place] [address] [place] [address] [address]  [place] [address]        Thank you!     Thank you for choosing [facility]  for your care. Our goal is always to provide you with excellent care. Hearing back from our patients is one way we can continue to improve our services. Please take a few minutes to complete the written survey that you may receive in the mail after your visit with us. Thank you!             Your Updated Medication List - Protect others around you: Learn how to safely use, store and throw away your medicines at www.disposemymeds.org.          This list is accurate as of: 5/4/17 11:59 PM.  Always use your most recent med list.                   Brand Name Dispense Instructions for use    aspirin 81 MG tablet      Take 81 mg by mouth daily       atorvastatin 40 MG tablet    LIPITOR    90 tablet    Take 1 tablet (40 mg) by mouth daily       CENTRUM SILVER per tablet      Take 1 tablet by mouth daily       metoprolol 25 MG 24 hr tablet    TOPROL XL    90 tablet    Take 1 tablet (25 mg) by mouth daily

## 2017-05-09 ENCOUNTER — OFFICE VISIT (OUTPATIENT)
Dept: FAMILY MEDICINE | Facility: CLINIC | Age: 72
End: 2017-05-09
Payer: COMMERCIAL

## 2017-05-09 VITALS
TEMPERATURE: 99.4 F | SYSTOLIC BLOOD PRESSURE: 111 MMHG | WEIGHT: 187.2 LBS | BODY MASS INDEX: 31.96 KG/M2 | OXYGEN SATURATION: 98 % | HEART RATE: 98 BPM | HEIGHT: 64 IN | DIASTOLIC BLOOD PRESSURE: 74 MMHG

## 2017-05-09 DIAGNOSIS — F51.04 CHRONIC INSOMNIA: ICD-10-CM

## 2017-05-09 DIAGNOSIS — B00.9 HERPES SIMPLEX VIRUS INFECTION: ICD-10-CM

## 2017-05-09 DIAGNOSIS — G89.4 CHRONIC PAIN SYNDROME: ICD-10-CM

## 2017-05-09 DIAGNOSIS — I35.0 AORTIC VALVE STENOSIS, UNSPECIFIED ETIOLOGY: ICD-10-CM

## 2017-05-09 DIAGNOSIS — Z12.31 ENCOUNTER FOR SCREENING MAMMOGRAM FOR BREAST CANCER: ICD-10-CM

## 2017-05-09 DIAGNOSIS — G89.29 CHRONIC LEFT-SIDED LOW BACK PAIN WITHOUT SCIATICA: ICD-10-CM

## 2017-05-09 DIAGNOSIS — F33.8 SEASONAL AFFECTIVE DISORDER (H): ICD-10-CM

## 2017-05-09 DIAGNOSIS — Z79.899 CONTROLLED SUBSTANCE AGREEMENT SIGNED: ICD-10-CM

## 2017-05-09 DIAGNOSIS — I10 BENIGN HYPERTENSION: ICD-10-CM

## 2017-05-09 DIAGNOSIS — E78.5 HYPERLIPIDEMIA LDL GOAL <130: Primary | ICD-10-CM

## 2017-05-09 DIAGNOSIS — M54.50 CHRONIC LEFT-SIDED LOW BACK PAIN WITHOUT SCIATICA: ICD-10-CM

## 2017-05-09 PROCEDURE — 99214 OFFICE O/P EST MOD 30 MIN: CPT | Performed by: FAMILY MEDICINE

## 2017-05-09 RX ORDER — GABAPENTIN 300 MG/1
300 CAPSULE ORAL 3 TIMES DAILY
Qty: 270 CAPSULE | Refills: 1 | Status: SHIPPED | OUTPATIENT
Start: 2017-05-09 | End: 2017-06-06

## 2017-05-09 RX ORDER — FUROSEMIDE 20 MG
20 TABLET ORAL DAILY
Qty: 90 TABLET | Refills: 1 | Status: ON HOLD | OUTPATIENT
Start: 2017-05-09 | End: 2017-06-16

## 2017-05-09 RX ORDER — MIRTAZAPINE 45 MG/1
45 TABLET, FILM COATED ORAL AT BEDTIME
Qty: 90 TABLET | Refills: 1 | Status: ON HOLD | OUTPATIENT
Start: 2017-05-09 | End: 2017-06-16

## 2017-05-09 RX ORDER — TRAMADOL HYDROCHLORIDE 50 MG/1
TABLET ORAL
Qty: 60 TABLET | Refills: 3 | Status: ON HOLD | OUTPATIENT
Start: 2017-05-09 | End: 2017-06-16

## 2017-05-09 RX ORDER — VALACYCLOVIR HYDROCHLORIDE 1 G/1
TABLET, FILM COATED ORAL
Qty: 4 TABLET | Refills: 3 | Status: SHIPPED | OUTPATIENT
Start: 2017-05-09 | End: 2018-08-30

## 2017-05-09 RX ORDER — TEMAZEPAM 15 MG/1
CAPSULE ORAL
Qty: 30 CAPSULE | Refills: 5 | Status: ON HOLD | OUTPATIENT
Start: 2017-05-09 | End: 2017-06-16

## 2017-05-09 RX ORDER — NAPROXEN 500 MG/1
500 TABLET ORAL 2 TIMES DAILY PRN
Qty: 90 TABLET | Refills: 1 | Status: ON HOLD | OUTPATIENT
Start: 2017-05-09 | End: 2017-06-16

## 2017-05-09 RX ORDER — TRAZODONE HYDROCHLORIDE 100 MG/1
TABLET ORAL
Qty: 90 TABLET | Refills: 1 | Status: ON HOLD | OUTPATIENT
Start: 2017-05-09 | End: 2017-06-16

## 2017-05-09 NOTE — MR AVS SNAPSHOT
After Visit Summary   5/9/2017    Kavitha Headley    MRN: [id number]           Patient Information     Date Of Birth          1945        Visit Information        Provider Department      5/9/2017 2:40 PM Alison Pena MD [facility] [place]        Today's Diagnoses     Hyperlipidemia LDL goal <130    -  1    Encounter for screening mammogram for breast cancer        Aortic valve stenosis, unspecified etiology        Benign hypertension        Chronic insomnia        Chronic left-sided low back pain without sciatica        Herpes simplex virus infection        Insomnia, unspecified type        Seasonal affective disorder (H)        Chronic pain syndrome        Controlled substance agreement signed           Follow-ups after your visit        Additional Services     CARDIOLOGY EVAL ADULT REFERRAL       Your provider has referred you to:  [facility]: [facility] [phone]   [email or web address]    Please be aware that coverage of these services is subject to the terms and limitations of your health insurance plan.  Call member services at your health plan with any benefit or coverage questions.      Type of Referral:  Cardiology Follow Up    Timeframe requested:  Within 1 month    Please bring the following to your appointment:  >>   Any x-rays, CTs or MRIs which have been performed.  Contact the facility where they were done to arrange for  prior to your scheduled appointment.    >>   List of current medications  >>   This referral request   >>   Any documents/labs given to you for this referral                  Your next 10 appointments already scheduled     May 10, 2017 11:30 AM CDT   LAB with EC LAB   [facility] [place] ([facility])    [address] [place] [id number] [phone]           OUTSIDE LABS:  Please include name of facility and  Physician that is requesting outside labs be drawn.  Please indicate if labs are fasting or non-fasting on appt notes.  Be as specific as you can on which labs are being drawn.            May 12, 2017  1:15 PM CDT   MA SCREENING DIGITAL BILATERAL with [id number]   [facility] ([facility])    [address], [address]  [facility] [place] [address]   [phone]           Do not use any powder, lotion or deodorant under your arms or on your breast. If you do, we will ask you to remove it before your exam.  Wear comfortable, two-piece clothing.  If you have any allergies, tell your care team.  Bring any previous mammograms from other facilities or have them mailed to the breast center. This mammogram location, [facility], now offers 3D mammography. It doesn't replace a screening mammogram and can be done with a regular screening mammogram. It is optional and not all insurances will pay for it. 3D mammography is a special kind of mammogram that produces a three-dimensional image of the breast by using low dose-xrays. 3D allows the radiologist to see the breast tissue differently from 2D, which reduces the chance of repeat testing due to overlapping breast tissue. If you are interested in have a 3D mammogram, please check with your insurance before you arrive for your exam. On the day of your exam you will be asked if you would like 3D imaging.            May 18, 2017  1:00 PM CDT   Ech Complete with ECECH1   [facility] [place] ([facility]    [address] [place] [id number] [phone]           1.  Please bring or wear a comfortable two-piece outfit. 2.  You may eat, drink and take your normal medicines. 3.  For any questions that cannot be answered, please contact the ordering physician            May 19, 2017  1:30 PM CDT   New Visit with Fam Sommer MD   [facility]  "[place] [facility])    [id number]  [place] [address] [phone]              Future tests that were ordered for you today     Open Future Orders        Priority Expected Expires Ordered    Echocardiogram Complete Routine  5/9/2018 5/9/2017    *MA Screening Digital Bilateral Routine  5/9/2018 5/9/2017    Lipid Profile Routine  5/9/2018 5/9/2017    Comprehensive metabolic panel Routine  5/9/2018 5/9/2017            Who to contact     If you have questions or need follow up information about today's clinic visit or your schedule please contact [facility] [place] directly at [phone].  Normal or non-critical lab and imaging results will be communicated to you by [facility], letter or phone within 4 business days after the clinic has received the results. If you do not hear from us within 7 days, please contact the clinic through [facility] or phone. If you have a critical or abnormal lab result, we will notify you by phone as soon as possible.  Submit refill requests through garbs or call your pharmacy and they will forward the refill request to us. Please allow 3 business days for your refill to be completed.          Additional Information About Your Visit        [email or web address] Information     garbs gives you secure access to your electronic health record. If you see a primary care provider, you can also send messages to your care team and make appointments. If you have questions, please call your primary care clinic.  If you do not have a primary care provider, please call [phone] and they will assist you.        Care EveryWhere ID     This is your Care EveryWhere ID. This could be used by other organizations to access your [place] medical records  [id number]        Your Vitals Were     Pulse Temperature Height Pulse Oximetry BMI (Body Mass Index)       98 99.4  F (37.4  C) (Tympanic) 5' 4\" (1.626 m) 98% 32.13 kg/m2        Blood Pressure from Last 3 Encounters: "   05/09/17 111/74   01/17/17 118/78   01/06/17 132/74    Weight from Last 3 Encounters:   05/09/17 187 lb 3.2 oz (84.9 kg)   01/17/17 194 lb (88 kg)   01/06/17 195 lb (88.5 kg)              We Performed the Following     CARDIOLOGY EVAL ADULT REFERRAL          Today's Medication Changes          These changes are accurate as of: 5/9/17  3:47 PM.  If you have any questions, ask your nurse or doctor.               These medicines have changed or have updated prescriptions.        Dose/Directions    furosemide 20 MG tablet   Commonly known as:  LASIX   This may have changed:    - how much to take  - how to take this  - when to take this  - additional instructions   Used for:  Benign hypertension   Changed by:  Alison Pena MD        Dose:  20 mg   Take 1 tablet (20 mg) by mouth daily   Quantity:  90 tablet   Refills:  1       traZODone 100 MG tablet   Commonly known as:  DESYREL   This may have changed:  See the new instructions.   Used for:  Chronic insomnia   Changed by:  Alison Pena MD        TAKE 1 TABLET (100 MG) BY MOUTH AT BEDTIME   Quantity:  90 tablet   Refills:  1       valACYclovir 1000 mg tablet   Commonly known as:  VALTREX   This may have changed:  See the new instructions.   Used for:  Herpes simplex virus infection   Changed by:  Alison Pena MD        TAKE 2 TABS EVERY 12 HRS FOR 1 DAY ONLY FOR OUTBREAKS OF COLD SORES as needed   Quantity:  4 tablet   Refills:  3            Where to get your medicines      These medications were sent to [facility]/pharmacy #[id number] - [place], [place] - [address] [address]  [address] [place] [address]     Phone:  [phone]     furosemide 20 MG tablet    gabapentin 300 MG capsule    mirtazapine 45 MG tablet    naproxen 500 MG tablet    traZODone 100 MG tablet    valACYclovir 1000 mg tablet         Some of these will need a paper prescription and others can be bought over the counter.  Ask your nurse if you have questions.     Bring a paper  prescription for each of these medications     temazepam 15 MG capsule    traMADol 50 MG tablet                Primary Care Provider Office Phone # Fax #    Alison Pena [phone]       [facility] [place] [address]  [place] [address]        Thank you!     Thank you for choosing [facility]  for your care. Our goal is always to provide you with excellent care. Hearing back from our patients is one way we can continue to improve our services. Please take a few minutes to complete the written survey that you may receive in the mail after your visit with us. Thank you!             Your Updated Medication List - Protect others around you: Learn how to safely use, store and throw away your medicines at www.disposemymeds.org.          This list is accurate as of: 5/9/17  3:47 PM.  Always use your most recent med list.                   Brand Name Dispense Instructions for use    aspirin 81 MG tablet      Take 81 mg by mouth daily       atorvastatin 40 MG tablet    LIPITOR    90 tablet    Take 1 tablet (40 mg) by mouth daily       CENTRUM SILVER per tablet      Take 1 tablet by mouth daily       furosemide 20 MG tablet    LASIX    90 tablet    Take 1 tablet (20 mg) by mouth daily       gabapentin 300 MG capsule    NEURONTIN    270 capsule    Take 1 capsule (300 mg) by mouth 3 times daily       metoprolol 25 MG 24 hr tablet    TOPROL XL    90 tablet    Take 1 tablet (25 mg) by mouth daily       mirtazapine 45 MG tablet    REMERON    90 tablet    Take 1 tablet (45 mg) by mouth At Bedtime       naproxen 500 MG tablet    NAPROSYN    90 tablet    Take 1 tablet (500 mg) by mouth 2 times daily as needed for moderate pain       temazepam 15 MG capsule    RESTORIL    30 capsule    TAKE 1 CAPSULES BY MOUTH AT BETIME AS NEEDED TO SLEEP. MUST LAST 30 DAYS.       traMADol 50 MG tablet    ULTRAM    60 tablet    TAKE 1 TABLET BY MOUTH TWICE DAILY AS NEEDED FOR MODERATE PAIN        traZODone 100 MG tablet    DESYREL    90 tablet    TAKE 1 TABLET (100 MG) BY MOUTH AT BEDTIME       valACYclovir 1000 mg tablet    VALTREX    4 tablet    TAKE 2 TABS EVERY 12 HRS FOR 1 DAY ONLY FOR OUTBREAKS OF COLD SORES as needed

## 2017-05-09 NOTE — PROGRESS NOTES
SUBJECTIVE:                                                    Kavitha Headley is a 71 year old female who presents to clinic today for the following health issues:    Hyperlipidemia Follow-Up      Rate your low fat/cholesterol diet?: good    Taking statin?  Yes, no muscle aches from statin    Other lipid medications/supplements?:  none     Hypertension Follow-up      Outpatient blood pressures are not being checked.    Low Salt Diet: no added salt       PHQ-9 SCORE 2/25/2016 10/6/2016 12/12/2016   Total Score - - -   Total Score 3 4 4     Well controlled insomnia symptoms.    Needs tramadol for pain.    Needs refill on valtrex for as needed use.     Problem list and histories reviewed & adjusted, as indicated.  Additional history: as documented    Patient Active Problem List   Diagnosis     Hyperlipidemia LDL goal <130     Spinal stenosis     Chronic insomnia     Alcohol abuse     CKD (chronic kidney disease) stage 3, GFR 30-59 ml/min     Hip joint replacement status     Knee joint replacement status     Chronic pain syndrome     Bariatric surgery status     Personal history of urinary calculi     Macrocytic anemia     Anxiety     Aortic stenosis     Left-sided low back pain without sciatica     ACP (advance care planning)     PAC (premature atrial contraction)     Gastroesophageal reflux disease, esophagitis presence not specified     Controlled substance agreement signed     Seasonal affective disorder (H)     HTN, goal below 140/90     Past Surgical History:   Procedure Laterality Date     APPENDECTOMY  3/2004    incidental     C GASTRIC BYPASS,OBESE<100CM ARIANNA-EN-Y  1996     C MEDIASTINOSCOPY W OR WO BIOPSY  2/2008    Videomediastinoscopy and, for mediastinal adenopathy -reactive lymphoid hyperplasia     C REPAIR OF RECTOCELE  3/2012     C TOTAL KNEE ARTHROPLASTY  12/2005    left      CARPAL TUNNEL RELEASE RT/LT  10/2010    Carpometacarpal excisional arthroplasty with a fascial autograft and APL suspension  sling (49586). 2. Left thumb metacarpophalangeal joint fusion with autologous bone graft (35983). 3. Left endoscopic carpal tunnel release      CHOLECYSTECTOMY, LAPOROSCOPIC  11/2010    Cholecystectomy, Laparoscopic     COLONOSCOPY N/A 9/8/2016    Procedure: COMBINED COLONOSCOPY, SINGLE OR MULTIPLE BIOPSY/POLYPECTOMY BY BIOPSY;  Surgeon: Moe Barlow MD;  Location:  GI     CYSTOCELE REPAIR  11/2012    davinci laparoscopic sacrocolpopexy, enterocele repair, lysis of adhesions, placement of retropubic mid urethral sling, cystoscopy     CYSTOSCOPY, LITHOTRIPSY, COMBINED  6/2006    Left extracorporeal shock wave lithotripsy, cystoscopy, left ureteral stent placement.     CYSTOSCOPY, REMOVE STENT(S), COMBINED  7/2006    Cystoscopy, removal of left ureteral stent, retrograde pyelography, flexible and rigid ureteroscopy and holmium laser lithotripsy, basket removal of stone fragments, ureteral stent placement.      HERNIA REPAIR  4/2012    bilateral augmentation mastopexy, ventral hernia repair, and medial thigh liposuction on 04/06/2012.      HYSTERECTOMY VAGINAL, BILATERAL SALPINGO-OOPHERECTOMY, COMBINED  1998    due to myoma and bleeding     JOINT REPLACEMENT, HIP RT/LT  4/2004    right total hip arthroplasty     LAPAROTOMY, LYSIS ADHESIONS, COMBINED  3/2004    lysis adhesions, ventral hernia repair, appendectomy incidentally     LYMPH NODE BIOPSY  4/2008    right axillary, reactive follicular and paracortical hyperplasia.     MAMMOPLASTY AUGMENTATION BILATERAL  4/2012     REVISE RECONSTRUCTED BREAST  6/7/2012    Left breast capsulotomy.        Social History   Substance Use Topics     Smoking status: Never Smoker     Smokeless tobacco: Never Used     Alcohol use 0.0 oz/week     0 Standard drinks or equivalent per week      Comment: 2-3 drinks per day     Family History   Problem Relation Age of Onset     Substance Abuse Father      CANCER Father      throat and lung mets     DIABETES No family hx of      Coronary  Artery Disease No family hx of      CEREBROVASCULAR DISEASE No family hx of          Current Outpatient Prescriptions   Medication Sig Dispense Refill     furosemide (LASIX) 20 MG tablet Take 1 tablet (20 mg) by mouth daily 90 tablet 1     traZODone (DESYREL) 100 MG tablet TAKE 1 TABLET (100 MG) BY MOUTH AT BEDTIME 90 tablet 1     gabapentin (NEURONTIN) 300 MG capsule Take 1 capsule (300 mg) by mouth 3 times daily 270 capsule 1     valACYclovir (VALTREX) 1000 mg tablet TAKE 2 TABS EVERY 12 HRS FOR 1 DAY ONLY FOR OUTBREAKS OF COLD SORES as needed 4 tablet 3     temazepam (RESTORIL) 15 MG capsule TAKE 1 CAPSULES BY MOUTH AT BETIME AS NEEDED TO SLEEP. MUST LAST 30 DAYS. 30 capsule 5     mirtazapine (REMERON) 45 MG tablet Take 1 tablet (45 mg) by mouth At Bedtime 90 tablet 1     traMADol (ULTRAM) 50 MG tablet TAKE 1 TABLET BY MOUTH TWICE DAILY AS NEEDED FOR MODERATE PAIN 60 tablet 3     naproxen (NAPROSYN) 500 MG tablet Take 1 tablet (500 mg) by mouth 2 times daily as needed for moderate pain 90 tablet 1     metoprolol (TOPROL XL) 25 MG 24 hr tablet Take 1 tablet (25 mg) by mouth daily 90 tablet 3     atorvastatin (LIPITOR) 40 MG tablet Take 1 tablet (40 mg) by mouth daily 90 tablet 1     Multiple Vitamins-Minerals (CENTRUM SILVER) per tablet Take 1 tablet by mouth daily       aspirin 81 MG tablet Take 81 mg by mouth daily       Allergies   Allergen Reactions     Bactrim [Sulfamethoxazole W/Trimethoprim] Hives     Codeine Itching     NAUSEA     Morphine Itching     NAUSEA       Reviewed and updated as needed this visit by clinical staff       Reviewed and updated as needed this visit by Provider         ROS:  C: NEGATIVE for fever, chills, change in weight  E/M: NEGATIVE for ear, mouth and throat problems  R: NEGATIVE for significant cough or SOB  CV: NEGATIVE for chest pain, palpitations or peripheral edema    OBJECTIVE:                                                    /74 (BP Location: Right arm, Patient  "Position: Chair, Cuff Size: Adult Large)  Pulse 98  Temp 99.4  F (37.4  C) (Tympanic)  Ht 5' 4\" (1.626 m)  Wt 187 lb 3.2 oz (84.9 kg)  SpO2 98%  BMI 32.13 kg/m2  Body mass index is 32.13 kg/(m^2).   GENERAL: healthy, alert, well nourished, well hydrated, no distress  HENT: ear canals- normal; TMs- normal; Nose- normal; Mouth- no ulcers, no lesions  NECK: no tenderness, no adenopathy, no asymmetry, no masses, no stiffness; thyroid- normal to palpation  RESP: lungs clear to auscultation - no rales, no rhonchi, no wheezes  CV: regular rates and rhythm, normal S1 S2, no S3 or S4 and no murmur, no click or rub -  ABDOMEN: soft, no tenderness, no  hepatosplenomegaly, no masses, normal bowel sounds  MS: extremities- no gross deformities noted, no edema  PSYCH: Alert and oriented times 3; speech- coherent , normal rate and volume; able to articulate logical thoughts, able to abstract reason, no tangential thoughts, no hallucinations or delusions, affect- normal         ASSESSMENT/PLAN:                                                    Chart review done today.    1. Hyperlipidemia LDL goal <130  Labs ordered. Patient is not fasting today.  - Lipid Profile; Future  - Comprehensive metabolic panel; Future    2. Aortic valve stenosis, unspecified etiology    Recommended to get repeat echo and follow up with cardiology for management of aortic valve stenosis.  - CARDIOLOGY EVAL ADULT REFERRAL  - Echocardiogram Complete; Future    3. Benign hypertension  Well controlled.   - furosemide (LASIX) 20 MG tablet; Take 1 tablet (20 mg) by mouth daily  Dispense: 90 tablet; Refill: 1    4. Chronic left-sided low back pain without sciatica  Refill on medications given. Symptoms are well controlled. Recommended to use it TID, instead of BID as she reported. This might lower the need to use tramadol and naproxen.  - gabapentin (NEURONTIN) 300 MG capsule; Take 1 capsule (300 mg) by mouth 3 times daily  Dispense: 270 capsule; Refill: " 1  - naproxen (NAPROSYN) 500 MG tablet; Take 1 tablet (500 mg) by mouth 2 times daily as needed for moderate pain  Dispense: 90 tablet; Refill: 1    5. Chronic insomnia  Well controlled. Refill ordered  - traZODone (DESYREL) 100 MG tablet; TAKE 1 TABLET (100 MG) BY MOUTH AT BEDTIME  Dispense: 90 tablet; Refill: 1    6. Encounter for screening mammogram for breast cancer  Screening mammogram ordered  - MA Screen w Implants Digital Bilat; Future    7. Herpes simplex virus infection  Refill on valtrex ordered  - valACYclovir (VALTREX) 1000 mg tablet; TAKE 2 TABS EVERY 12 HRS FOR 1 DAY ONLY FOR OUTBREAKS OF COLD SORES as needed  Dispense: 4 tablet; Refill: 3    8. Seasonal affective disorder (H)  Well controlled.   - mirtazapine (REMERON) 45 MG tablet; Take 1 tablet (45 mg) by mouth At Bedtime  Dispense: 90 tablet; Refill: 1    9. Chronic pain syndrome  Refill ordered  - traMADol (ULTRAM) 50 MG tablet; TAKE 1 TABLET BY MOUTH TWICE DAILY AS NEEDED FOR MODERATE PAIN  Dispense: 60 tablet; Refill: 3    10. Controlled substance agreement signed    - traMADol (ULTRAM) 50 MG tablet; TAKE 1 TABLET BY MOUTH TWICE DAILY AS NEEDED FOR MODERATE PAIN  Dispense: 60 tablet; Refill: 3      Follow up with Provider - as needed     Alison Pena MD  [facility]

## 2017-05-09 NOTE — NURSING NOTE
"Chief Complaint   Patient presents with     Recheck Medication       Initial There were no vitals taken for this visit. Estimated body mass index is 33.3 kg/(m^2) as calculated from the following:    Height as of 1/17/17: 5' 4\" (1.626 m).    Weight as of 1/17/17: 194 lb (88 kg).  Medication Reconciliation: complete   [place] CMA    "

## 2017-05-11 DIAGNOSIS — E78.5 HYPERLIPIDEMIA LDL GOAL <130: ICD-10-CM

## 2017-05-11 PROCEDURE — 36415 COLL VENOUS BLD VENIPUNCTURE: CPT | Performed by: FAMILY MEDICINE

## 2017-05-11 PROCEDURE — 80061 LIPID PANEL: CPT | Performed by: FAMILY MEDICINE

## 2017-05-12 LAB
CHOLEST SERPL-MCNC: 232 MG/DL
HDLC SERPL-MCNC: 114 MG/DL
LDLC SERPL CALC-MCNC: 98 MG/DL
NONHDLC SERPL-MCNC: 118 MG/DL
TRIGL SERPL-MCNC: 98 MG/DL

## 2017-05-16 ENCOUNTER — TELEPHONE (OUTPATIENT)
Dept: FAMILY MEDICINE | Facility: CLINIC | Age: 72
End: 2017-05-16

## 2017-05-26 ENCOUNTER — OFFICE VISIT (OUTPATIENT)
Dept: FAMILY MEDICINE | Facility: CLINIC | Age: 72
End: 2017-05-26
Payer: COMMERCIAL

## 2017-05-26 VITALS
HEART RATE: 85 BPM | WEIGHT: 187 LBS | TEMPERATURE: 98.5 F | BODY MASS INDEX: 32.1 KG/M2 | OXYGEN SATURATION: 96 % | DIASTOLIC BLOOD PRESSURE: 78 MMHG | SYSTOLIC BLOOD PRESSURE: 104 MMHG

## 2017-05-26 DIAGNOSIS — L03.116 BILATERAL LOWER LEG CELLULITIS: Primary | ICD-10-CM

## 2017-05-26 DIAGNOSIS — E87.6 HYPOKALEMIA: ICD-10-CM

## 2017-05-26 DIAGNOSIS — N18.30 CKD (CHRONIC KIDNEY DISEASE) STAGE 3, GFR 30-59 ML/MIN (H): ICD-10-CM

## 2017-05-26 DIAGNOSIS — L03.115 BILATERAL LOWER LEG CELLULITIS: Primary | ICD-10-CM

## 2017-05-26 DIAGNOSIS — E87.1 HYPONATREMIA: ICD-10-CM

## 2017-05-26 LAB
BASOPHILS # BLD AUTO: 0.1 10E9/L (ref 0–0.2)
BASOPHILS NFR BLD AUTO: 0.6 %
DIFFERENTIAL METHOD BLD: ABNORMAL
EOSINOPHIL # BLD AUTO: 0.3 10E9/L (ref 0–0.7)
EOSINOPHIL NFR BLD AUTO: 4.1 %
ERYTHROCYTE [DISTWIDTH] IN BLOOD BY AUTOMATED COUNT: 13.2 % (ref 10–15)
HCT VFR BLD AUTO: 37.3 % (ref 35–47)
HGB BLD-MCNC: 13.6 G/DL (ref 11.7–15.7)
LYMPHOCYTES # BLD AUTO: 1.5 10E9/L (ref 0.8–5.3)
LYMPHOCYTES NFR BLD AUTO: 18.6 %
MCH RBC QN AUTO: 37.6 PG (ref 26.5–33)
MCHC RBC AUTO-ENTMCNC: 36.5 G/DL (ref 31.5–36.5)
MCV RBC AUTO: 103 FL (ref 78–100)
MONOCYTES # BLD AUTO: 0.9 10E9/L (ref 0–1.3)
MONOCYTES NFR BLD AUTO: 11.4 %
NEUTROPHILS # BLD AUTO: 5.3 10E9/L (ref 1.6–8.3)
NEUTROPHILS NFR BLD AUTO: 65.3 %
PLATELET # BLD AUTO: 242 10E9/L (ref 150–450)
RBC # BLD AUTO: 3.62 10E12/L (ref 3.8–5.2)
WBC # BLD AUTO: 8.1 10E9/L (ref 4–11)

## 2017-05-26 PROCEDURE — 99214 OFFICE O/P EST MOD 30 MIN: CPT | Performed by: INTERNAL MEDICINE

## 2017-05-26 PROCEDURE — 36415 COLL VENOUS BLD VENIPUNCTURE: CPT | Performed by: INTERNAL MEDICINE

## 2017-05-26 PROCEDURE — 80048 BASIC METABOLIC PNL TOTAL CA: CPT | Performed by: INTERNAL MEDICINE

## 2017-05-26 PROCEDURE — 85025 COMPLETE CBC W/AUTO DIFF WBC: CPT | Performed by: INTERNAL MEDICINE

## 2017-05-26 RX ORDER — CLINDAMYCIN HCL 300 MG
300 CAPSULE ORAL 3 TIMES DAILY
Qty: 30 CAPSULE | Refills: 0 | Status: SHIPPED | OUTPATIENT
Start: 2017-05-26 | End: 2017-05-30

## 2017-05-26 NOTE — MR AVS SNAPSHOT
After Visit Summary   5/26/2017    Kavitha Headley    MRN: [id number]           Patient Information     Date Of Birth          1945        Visit Information        Provider Department      5/26/2017 3:20 PM Yoon Mccurdy MD [facility]        Today's Diagnoses     Bilateral lower leg cellulitis    -  1    CKD (chronic kidney disease) stage 3, GFR 30-59 ml/min           Follow-ups after your visit        Your next 10 appointments already scheduled     May 30, 2017 12:45 PM CDT   New Visit with Nayeli Bartholomew MD   [facility] [place] ([facility])    [address] [place], [place] [id number]  [facility] [address] [phone]              Who to contact     If you have questions or need follow up information about today's clinic visit or your schedule please contact [facility] [place] directly at [phone].  Normal or non-critical lab and imaging results will be communicated to you by MyChart, letter or phone within 4 business days after the clinic has received the results. If you do not hear from us within 7 days, please contact the clinic through [employer] or phone. If you have a critical or abnormal lab result, we will notify you by phone as soon as possible.  Submit refill requests through [employer] or call your pharmacy and they will forward the refill request to us. Please allow 3 business days for your refill to be completed.          Additional Information About Your Visit        MyChart Information     Segment gives you secure access to your electronic health record. If you see a primary care provider, you can also send messages to your care team and make appointments. If you have questions, please call your primary care clinic.  If you do not have a primary care provider, please call [phone] and they will assist you.        Care EveryWhere ID     This is your Care EveryWhere ID. This could be used by other  organizations to access your [place] medical records  [id number]        Your Vitals Were     Pulse Temperature Pulse Oximetry BMI (Body Mass Index)          85 98.5  F (36.9  C) (Oral) 96% 32.1 kg/m2         Blood Pressure from Last 3 Encounters:   05/26/17 104/78   05/09/17 111/74   01/17/17 118/78    Weight from Last 3 Encounters:   05/26/17 187 lb (84.8 kg)   05/09/17 187 lb 3.2 oz (84.9 kg)   01/17/17 194 lb (88 kg)              We Performed the Following     Basic metabolic panel     CBC with platelets differential          Today's Medication Changes          These changes are accurate as of: 5/26/17  4:18 PM.  If you have any questions, ask your nurse or doctor.               Start taking these medicines.        Dose/Directions    clindamycin 300 MG capsule   Commonly known as:  CLEOCIN   Used for:  Bilateral lower leg cellulitis   Started by:  Yoon Mccurdy MD        Dose:  300 mg   Take 1 capsule (300 mg) by mouth 3 times daily   Quantity:  30 capsule   Refills:  0            Where to get your medicines      These medications were sent to [facility]/pharmacy #[id number] - [place]CLA[place] - [address] AT [place] [address]  [address] [address]     Phone:  [phone]     clindamycin 300 MG capsule                Primary Care Provider Office Phone # Fax #    Alison Pena [phone]       [facility] [place] [address] [place] [address] [address]  [place] [address]        Thank you!     Thank you for choosing [facility]  for your care. Our goal is always to provide you with excellent care. Hearing back from our patients is one way we can continue to improve our services. Please take a few minutes to complete the written survey that you may receive in the mail after your visit with us. Thank you!             Your Updated Medication List - Protect others around you: Learn how to safely use, store and throw away your medicines at www.disposemymeds.org.           This list is accurate as of: 5/26/17  4:18 PM.  Always use your most recent med list.                   Brand Name Dispense Instructions for use    aspirin 81 MG tablet      Take 81 mg by mouth daily       atorvastatin 40 MG tablet    LIPITOR    90 tablet    Take 1 tablet (40 mg) by mouth daily       CENTRUM SILVER per tablet      Take 1 tablet by mouth daily       clindamycin 300 MG capsule    CLEOCIN    30 capsule    Take 1 capsule (300 mg) by mouth 3 times daily       furosemide 20 MG tablet    LASIX    90 tablet    Take 1 tablet (20 mg) by mouth daily       gabapentin 300 MG capsule    NEURONTIN    270 capsule    Take 1 capsule (300 mg) by mouth 3 times daily       metoprolol 25 MG 24 hr tablet    TOPROL XL    90 tablet    Take 1 tablet (25 mg) by mouth daily       mirtazapine 45 MG tablet    REMERON    90 tablet    Take 1 tablet (45 mg) by mouth At Bedtime       naproxen 500 MG tablet    NAPROSYN    90 tablet    Take 1 tablet (500 mg) by mouth 2 times daily as needed for moderate pain       temazepam 15 MG capsule    RESTORIL    30 capsule    TAKE 1 CAPSULES BY MOUTH AT BETIME AS NEEDED TO SLEEP. MUST LAST 30 DAYS.       traMADol 50 MG tablet    ULTRAM    60 tablet    TAKE 1 TABLET BY MOUTH TWICE DAILY AS NEEDED FOR MODERATE PAIN       traZODone 100 MG tablet    DESYREL    90 tablet    TAKE 1 TABLET (100 MG) BY MOUTH AT BEDTIME       valACYclovir 1000 mg tablet    VALTREX    4 tablet    TAKE 2 TABS EVERY 12 HRS FOR 1 DAY ONLY FOR OUTBREAKS OF COLD SORES as needed

## 2017-05-26 NOTE — PROGRESS NOTES
Cell    SUBJECTIVE:                                                    Kavitha Headley is a 71 year old female who presents to clinic today for the following health issues:      Concern - cellulitis      Onset: few days     Description:   Cellulitis both ankles     Intensity: mild    Progression of Symptoms:  worsening    Accompanying Signs & Symptoms:  Itching        Previous history of similar problem:   Yes     Precipitating factors:   Worsened by:     Alleviating factors:  Improved by:        Therapies Tried and outcome: Amoxicillin     Kavitha is a 72 y/o female with CKD Stage 3 and venous stasis with bilateral lower extremity swelling who reports onset of redness and tenderness in her right lower leg then spreading to her left lower leg. She denies fevers. She says that this has happened in the past and sometimes she is able to ameliorate it with her water pill, compression stockings, and elevating her legs.       Problem list and histories reviewed & adjusted, as indicated.  Additional history: as documented    Patient Active Problem List   Diagnosis     Hyperlipidemia LDL goal <130     Spinal stenosis     Chronic insomnia     Alcohol abuse     CKD (chronic kidney disease) stage 3, GFR 30-59 ml/min     Hip joint replacement status     Knee joint replacement status     Chronic pain syndrome     Bariatric surgery status     Personal history of urinary calculi     Macrocytic anemia     Anxiety     Aortic stenosis     Left-sided low back pain without sciatica     ACP (advance care planning)     PAC (premature atrial contraction)     Gastroesophageal reflux disease, esophagitis presence not specified     Controlled substance agreement signed     Seasonal affective disorder (H)     HTN, goal below 140/90     Bilateral lower leg cellulitis     Hypokalemia     Hyponatremia     Past Surgical History:   Procedure Laterality Date     APPENDECTOMY  3/2004    incidental     C GASTRIC BYPASS,OBESE<100CM ARIANNA-EN-Y  1996      C MEDIASTINOSCOPY W OR WO BIOPSY  2/2008    Videomediastinoscopy and, for mediastinal adenopathy -reactive lymphoid hyperplasia     C REPAIR OF RECTOCELE  3/2012     C TOTAL KNEE ARTHROPLASTY  12/2005    left      CARPAL TUNNEL RELEASE RT/LT  10/2010    Carpometacarpal excisional arthroplasty with a fascial autograft and APL suspension sling (25095). 2. Left thumb metacarpophalangeal joint fusion with autologous bone graft (01218). 3. Left endoscopic carpal tunnel release      CHOLECYSTECTOMY, LAPOROSCOPIC  11/2010    Cholecystectomy, Laparoscopic     COLONOSCOPY N/A 9/8/2016    Procedure: COMBINED COLONOSCOPY, SINGLE OR MULTIPLE BIOPSY/POLYPECTOMY BY BIOPSY;  Surgeon: Moe Barlow MD;  Location:  GI     CYSTOCELE REPAIR  11/2012    davinc laparoscopic sacrocolpopexy, enterocele repair, lysis of adhesions, placement of retropubic mid urethral sling, cystoscopy     CYSTOSCOPY, LITHOTRIPSY, COMBINED  6/2006    Left extracorporeal shock wave lithotripsy, cystoscopy, left ureteral stent placement.     CYSTOSCOPY, REMOVE STENT(S), COMBINED  7/2006    Cystoscopy, removal of left ureteral stent, retrograde pyelography, flexible and rigid ureteroscopy and holmium laser lithotripsy, basket removal of stone fragments, ureteral stent placement.      HERNIA REPAIR  4/2012    bilateral augmentation mastopexy, ventral hernia repair, and medial thigh liposuction on 04/06/2012.      HYSTERECTOMY VAGINAL, BILATERAL SALPINGO-OOPHERECTOMY, COMBINED  1998    due to myoma and bleeding     JOINT REPLACEMENT, HIP RT/LT  4/2004    right total hip arthroplasty     LAPAROTOMY, LYSIS ADHESIONS, COMBINED  3/2004    lysis adhesions, ventral hernia repair, appendectomy incidentally     LYMPH NODE BIOPSY  4/2008    right axillary, reactive follicular and paracortical hyperplasia.     MAMMOPLASTY AUGMENTATION BILATERAL  4/2012     REVISE RECONSTRUCTED BREAST  6/7/2012    Left breast capsulotomy.        Social History   Substance Use Topics      Smoking status: Never Smoker     Smokeless tobacco: Never Used     Alcohol use 0.0 oz/week     0 Standard drinks or equivalent per week      Comment: 2-3 drinks per day     Family History   Problem Relation Age of Onset     Substance Abuse Father      CANCER Father      throat and lung mets     DIABETES No family hx of      Coronary Artery Disease No family hx of      CEREBROVASCULAR DISEASE No family hx of            Reviewed and updated as needed this visit by clinical staff       Reviewed and updated as needed this visit by Provider         ROS:  Constitutional, HEENT, cardiovascular, pulmonary, GI, , musculoskeletal, neuro, skin, endocrine and psych systems are negative, except as otherwise noted.    OBJECTIVE:                                                    /78 (BP Location: Right arm, Cuff Size: Adult Regular)  Pulse 85  Temp 98.5  F (36.9  C) (Oral)  Wt 187 lb (84.8 kg)  SpO2 96%  BMI 32.1 kg/m2  Body mass index is 32.1 kg/(m^2).  GENERAL: healthy, alert and no distress  NECK: no adenopathy, no asymmetry, masses, or scars and thyroid normal to palpation  RESP: lungs clear to auscultation - no rales, rhonchi or wheezes  CV: regular rate and rhythm, normal S1 S2, no S3 or S4, no murmur, click or rub, no peripheral edema and peripheral pulses strong  MS: 2+ pitting edema in both ankles with 1+ in the shins, skin is erythematous with slight purplish hue, there is no lymphatic spreading, no ulcerations or open lesions.     Diagnostic Test Results:  See EPIC     ASSESSMENT/PLAN:                                                      1. Bilateral lower leg cellulitis  Clindamycin 300 mg TID x 10 days. Counseled that if erythema spreads, fever develops, or she does not notice improvement in 24 hours she needs to be seen again.   - Basic metabolic panel  - CBC with platelets differential    2. CKD (chronic kidney disease) stage 3, GFR 30-59 ml/min  Stable    3. Hyponatremia  Sodium only 129 mg/dl. At  last check was 128 mg/dL. Last echo in 2015 showed hyperactive EF without diastolic dysfunction, but significant lower extremity swelling could indicate hypervolemic hyponatremia. More likely she is euvolemic and may have SIADH.     4. Hypokalemia  Most likely due to Lasix.       Follow up as needed.      Yoon Mccurdy MD  [facility] [place]

## 2017-05-27 LAB
ANION GAP SERPL CALCULATED.3IONS-SCNC: 10 MMOL/L (ref 3–14)
BUN SERPL-MCNC: 6 MG/DL (ref 7–30)
CALCIUM SERPL-MCNC: 8.5 MG/DL (ref 8.5–10.1)
CHLORIDE SERPL-SCNC: 84 MMOL/L (ref 94–109)
CO2 SERPL-SCNC: 35 MMOL/L (ref 20–32)
CREAT SERPL-MCNC: 0.96 MG/DL (ref 0.52–1.04)
GFR SERPL CREATININE-BSD FRML MDRD: 57 ML/MIN/1.7M2
GLUCOSE SERPL-MCNC: 101 MG/DL (ref 70–99)
POTASSIUM SERPL-SCNC: 3.2 MMOL/L (ref 3.4–5.3)
SODIUM SERPL-SCNC: 129 MMOL/L (ref 133–144)

## 2017-05-30 ENCOUNTER — OFFICE VISIT (OUTPATIENT)
Dept: FAMILY MEDICINE | Facility: CLINIC | Age: 72
End: 2017-05-30
Payer: COMMERCIAL

## 2017-05-30 VITALS
TEMPERATURE: 98.6 F | HEART RATE: 69 BPM | SYSTOLIC BLOOD PRESSURE: 112 MMHG | WEIGHT: 193.4 LBS | OXYGEN SATURATION: 95 % | HEIGHT: 64 IN | DIASTOLIC BLOOD PRESSURE: 62 MMHG | BODY MASS INDEX: 33.02 KG/M2

## 2017-05-30 DIAGNOSIS — L03.115 BILATERAL LOWER LEG CELLULITIS: Primary | ICD-10-CM

## 2017-05-30 DIAGNOSIS — L03.116 BILATERAL LOWER LEG CELLULITIS: Primary | ICD-10-CM

## 2017-05-30 PROCEDURE — 99213 OFFICE O/P EST LOW 20 MIN: CPT | Performed by: FAMILY MEDICINE

## 2017-05-30 RX ORDER — MINOCYCLINE HYDROCHLORIDE 100 MG/1
100 CAPSULE ORAL 2 TIMES DAILY
Qty: 20 CAPSULE | Refills: 0 | Status: ON HOLD | OUTPATIENT
Start: 2017-05-30 | End: 2017-06-16

## 2017-05-30 NOTE — PROGRESS NOTES
SUBJECTIVE:                                                    Kavitha Headley is a 71 year old female who presents to clinic today for the following health issues:      Concern - Cellulitis      Onset: 2 weeks    Description:   Cellulitis in both bottom parts of legs    Intensity: moderate, severe    Progression of Symptoms:  worsening    Accompanying Signs & Symptoms:  Red, swollen, warm to touch, itching, discomfort         Previous history of similar problem:       Precipitating factors:   Worsened by:     Alleviating factors:  Improved by:        Therapies Tried and outcome: Amoxicillin and clindamycin       Patient was seen on 5/26/17 at the clinic. She reported she took amoxicillin for 3 days without improvement. She is not sure where she got the medication from. She was switched to clindamycin. She took 3 doses and could not tolerate the GI side effects therefore she stopped the medication. Reports of worsening of swelling and achiness in both the legs but worse on the right side. Denies any fever or chills.          Problem list and histories reviewed & adjusted, as indicated.  Additional history: as documented    Patient Active Problem List   Diagnosis     Hyperlipidemia LDL goal <130     Spinal stenosis     Chronic insomnia     Alcohol abuse     CKD (chronic kidney disease) stage 3, GFR 30-59 ml/min     Hip joint replacement status     Knee joint replacement status     Chronic pain syndrome     Bariatric surgery status     Personal history of urinary calculi     Macrocytic anemia     Anxiety     Aortic stenosis     Left-sided low back pain without sciatica     ACP (advance care planning)     PAC (premature atrial contraction)     Gastroesophageal reflux disease, esophagitis presence not specified     Controlled substance agreement signed     Seasonal affective disorder (H)     HTN, goal below 140/90     Past Surgical History:   Procedure Laterality Date     APPENDECTOMY  3/2004    incidental     C  GASTRIC BYPASS,OBESE<100CM ARIANNA-EN-Y  1996     C MEDIASTINOSCOPY W OR WO BIOPSY  2/2008    Videomediastinoscopy and, for mediastinal adenopathy -reactive lymphoid hyperplasia     C REPAIR OF RECTOCELE  3/2012     C TOTAL KNEE ARTHROPLASTY  12/2005    left      CARPAL TUNNEL RELEASE RT/LT  10/2010    Carpometacarpal excisional arthroplasty with a fascial autograft and APL suspension sling (72903). 2. Left thumb metacarpophalangeal joint fusion with autologous bone graft (76886). 3. Left endoscopic carpal tunnel release      CHOLECYSTECTOMY, LAPOROSCOPIC  11/2010    Cholecystectomy, Laparoscopic     COLONOSCOPY N/A 9/8/2016    Procedure: COMBINED COLONOSCOPY, SINGLE OR MULTIPLE BIOPSY/POLYPECTOMY BY BIOPSY;  Surgeon: Moe Barlow MD;  Location:  GI     CYSTOCELE REPAIR  11/2012    davinc laparoscopic sacrocolpopexy, enterocele repair, lysis of adhesions, placement of retropubic mid urethral sling, cystoscopy     CYSTOSCOPY, LITHOTRIPSY, COMBINED  6/2006    Left extracorporeal shock wave lithotripsy, cystoscopy, left ureteral stent placement.     CYSTOSCOPY, REMOVE STENT(S), COMBINED  7/2006    Cystoscopy, removal of left ureteral stent, retrograde pyelography, flexible and rigid ureteroscopy and holmium laser lithotripsy, basket removal of stone fragments, ureteral stent placement.      HERNIA REPAIR  4/2012    bilateral augmentation mastopexy, ventral hernia repair, and medial thigh liposuction on 04/06/2012.      HYSTERECTOMY VAGINAL, BILATERAL SALPINGO-OOPHERECTOMY, COMBINED  1998    due to myoma and bleeding     JOINT REPLACEMENT, HIP RT/LT  4/2004    right total hip arthroplasty     LAPAROTOMY, LYSIS ADHESIONS, COMBINED  3/2004    lysis adhesions, ventral hernia repair, appendectomy incidentally     LYMPH NODE BIOPSY  4/2008    right axillary, reactive follicular and paracortical hyperplasia.     MAMMOPLASTY AUGMENTATION BILATERAL  4/2012     REVISE RECONSTRUCTED BREAST  6/7/2012    Left breast capsulotomy.         Social History   Substance Use Topics     Smoking status: Never Smoker     Smokeless tobacco: Never Used     Alcohol use 0.0 oz/week     0 Standard drinks or equivalent per week      Comment: 2-3 drinks per day     Family History   Problem Relation Age of Onset     Substance Abuse Father      CANCER Father      throat and lung mets     DIABETES No family hx of      Coronary Artery Disease No family hx of      CEREBROVASCULAR DISEASE No family hx of          Current Outpatient Prescriptions   Medication Sig Dispense Refill     minocycline (MINOCIN/DYNACIN) 100 MG capsule Take 1 capsule (100 mg) by mouth 2 times daily for 10 days 20 capsule 0     furosemide (LASIX) 20 MG tablet Take 1 tablet (20 mg) by mouth daily 90 tablet 1     traZODone (DESYREL) 100 MG tablet TAKE 1 TABLET (100 MG) BY MOUTH AT BEDTIME 90 tablet 1     gabapentin (NEURONTIN) 300 MG capsule Take 1 capsule (300 mg) by mouth 3 times daily 270 capsule 1     valACYclovir (VALTREX) 1000 mg tablet TAKE 2 TABS EVERY 12 HRS FOR 1 DAY ONLY FOR OUTBREAKS OF COLD SORES as needed 4 tablet 3     temazepam (RESTORIL) 15 MG capsule TAKE 1 CAPSULES BY MOUTH AT BETIME AS NEEDED TO SLEEP. MUST LAST 30 DAYS. 30 capsule 5     mirtazapine (REMERON) 45 MG tablet Take 1 tablet (45 mg) by mouth At Bedtime 90 tablet 1     traMADol (ULTRAM) 50 MG tablet TAKE 1 TABLET BY MOUTH TWICE DAILY AS NEEDED FOR MODERATE PAIN 60 tablet 3     naproxen (NAPROSYN) 500 MG tablet Take 1 tablet (500 mg) by mouth 2 times daily as needed for moderate pain 90 tablet 1     metoprolol (TOPROL XL) 25 MG 24 hr tablet Take 1 tablet (25 mg) by mouth daily 90 tablet 3     atorvastatin (LIPITOR) 40 MG tablet Take 1 tablet (40 mg) by mouth daily 90 tablet 1     Multiple Vitamins-Minerals (CENTRUM SILVER) per tablet Take 1 tablet by mouth daily       aspirin 81 MG tablet Take 81 mg by mouth daily       Allergies   Allergen Reactions     Bactrim [Sulfamethoxazole W/Trimethoprim] Hives     Codeine  "Itching     NAUSEA     Morphine Itching     NAUSEA       Reviewed and updated as needed this visit by clinical staff       Reviewed and updated as needed this visit by Provider         ROS:  R: NEGATIVE for significant cough or SOB  CV: NEGATIVE for chest pain, palpitations or peripheral edema    OBJECTIVE:                                                    /62  Pulse 69  Temp 98.6  F (37  C) (Tympanic)  Ht 5' 4\" (1.626 m)  Wt 193 lb 6.4 oz (87.7 kg)  SpO2 95%  BMI 33.2 kg/m2  Body mass index is 33.2 kg/(m^2).   GENERAL: healthy, alert, well nourished, well hydrated, no distress  RESP: lungs clear to auscultation - no rales, no rhonchi, no wheezes  CV: regular rates and rhythm, normal S1 S2, no S3 or S4 and no murmur, no click or rub -  SKIN: Erythema with warmth and mild tenderness to touch with trace swelling in both the lower extremities up to the mid shin. Worse on the right side as compared to the left         ASSESSMENT/PLAN:                                                        ICD-10-CM    1. Bilateral lower leg cellulitis L03.116 minocycline (MINOCIN/DYNACIN) 100 MG capsule    L03.115      Recommended to start minocycline 100 mg capsule b.i.d. for 10 days. Recommended to stay hydrated. She has tramadol, if needed for pain. If symptoms are not improving in the next few days or if any signs of worsening noted, instructed to notify us back immediately  Patient verbalized understanding and agreement to the plan.  Follow up with Provider - as needed     Alison Pena MD  [facility]  "

## 2017-05-30 NOTE — MR AVS SNAPSHOT
After Visit Summary   5/30/2017    Kavitha Headley    MRN: [id number]           Patient Information     Date Of Birth          1945        Visit Information        Provider Department      5/30/2017 2:40 PM Alison Pena MD [facility] [place]        Today's Diagnoses     Bilateral lower leg cellulitis    -  1       Follow-ups after your visit        Your next 10 appointments already scheduled     May 31, 2017  2:30 PM CDT   Ech Complete with MGECHR1, MG ECHO TECH   [facility] ([facility])    [address] [place] [id number] [phone]           1.  Please bring or wear a comfortable two-piece outfit. 2.  You may eat, drink and take your normal medicines. 3.  For any questions that cannot be answered, please contact the ordering physician            Jun 16, 2017  4:15 PM CDT   New Visit with Nayeli Bartholomew MD   [facility] [place] ([facility])    [address] [place] [id number] [phone]              Who to contact     If you have questions or need follow up information about today's clinic visit or your schedule please contact [facility] [place] directly at [phone].  Normal or non-critical lab and imaging results will be communicated to you by MyChart, letter or phone within 4 business days after the clinic has received the results. If you do not hear from us within 7 days, please contact the clinic through MyChart or phone. If you have a critical or abnormal lab result, we will notify you by phone as soon as possible.  Submit refill requests through [employer] or call your pharmacy and they will forward the refill request to us. Please allow 3 business days for your refill to be completed.          Additional Information About Your Visit        [employer] Information     [employer] gives you secure access to your electronic health record. If you see  "a primary care provider, you can also send messages to your care team and make appointments. If you have questions, please call your primary care clinic.  If you do not have a primary care provider, please call [phone] and they will assist you.        Care EveryWhere ID     This is your Care EveryWhere ID. This could be used by other organizations to access your [facility] medical records  [id number]        Your Vitals Were     Pulse Temperature Height Pulse Oximetry BMI (Body Mass Index)       69 98.6  F (37  C) (Tympanic) 5' 4\" (1.626 m) 95% 33.2 kg/m2        Blood Pressure from Last 3 Encounters:   05/30/17 112/62   05/26/17 104/78   05/09/17 111/74    Weight from Last 3 Encounters:   05/30/17 193 lb 6.4 oz (87.7 kg)   05/26/17 187 lb (84.8 kg)   05/09/17 187 lb 3.2 oz (84.9 kg)              Today, you had the following     No orders found for display         Today's Medication Changes          These changes are accurate as of: 5/30/17  3:12 PM.  If you have any questions, ask your nurse or doctor.               Start taking these medicines.        Dose/Directions    minocycline 100 MG capsule   Commonly known as:  MINOCIN/DYNACIN   Used for:  Bilateral lower leg cellulitis   Started by:  Alison Pena MD        Dose:  100 mg   Take 1 capsule (100 mg) by mouth 2 times daily for 10 days   Quantity:  20 capsule   Refills:  0         Stop taking these medicines if you haven't already. Please contact your care team if you have questions.     clindamycin 300 MG capsule   Commonly known as:  CLEOCIN   Stopped by:  Alison Pena MD                Where to get your medicines      These medications were sent to [facility]/pharmacy #[id number] - [place] - [address] AT [facility] [place] [address]  [address] [place] [address]     Phone:  [phone]     minocycline 100 MG capsule                Primary Care Provider Office Phone # Fax #    Alison Pena [phone]       [facility] [place] [address]  [place] [address]        Thank you!     Thank you for choosing [facility] [place]  for your care. Our goal is always to provide you with excellent care. Hearing back from our patients is one way we can continue to improve our services. Please take a few minutes to complete the written survey that you may receive in the mail after your visit with us. Thank you!             Your Updated Medication List - Protect others around you: Learn how to safely use, store and throw away your medicines at www.disposemymeds.org.          This list is accurate as of: 5/30/17  3:12 PM.  Always use your most recent med list.                   Brand Name Dispense Instructions for use    aspirin 81 MG tablet      Take 81 mg by mouth daily       atorvastatin 40 MG tablet    LIPITOR    90 tablet    Take 1 tablet (40 mg) by mouth daily       CENTRUM SILVER per tablet      Take 1 tablet by mouth daily       furosemide 20 MG tablet    LASIX    90 tablet    Take 1 tablet (20 mg) by mouth daily       gabapentin 300 MG capsule    NEURONTIN    270 capsule    Take 1 capsule (300 mg) by mouth 3 times daily       metoprolol 25 MG 24 hr tablet    TOPROL XL    90 tablet    Take 1 tablet (25 mg) by mouth daily       minocycline 100 MG capsule    MINOCIN/DYNACIN    20 capsule    Take 1 capsule (100 mg) by mouth 2 times daily for 10 days       mirtazapine 45 MG tablet    REMERON    90 tablet    Take 1 tablet (45 mg) by mouth At Bedtime       naproxen 500 MG tablet    NAPROSYN    90 tablet    Take 1 tablet (500 mg) by mouth 2 times daily as needed for moderate pain       temazepam 15 MG capsule    RESTORIL    30 capsule    TAKE 1 CAPSULES BY MOUTH AT BETIME AS NEEDED TO SLEEP. MUST LAST 30 DAYS.       traMADol 50 MG tablet    ULTRAM    60 tablet    TAKE 1 TABLET BY MOUTH TWICE DAILY AS NEEDED FOR MODERATE PAIN       traZODone 100 MG tablet    DESYREL    90 tablet    TAKE 1 TABLET (100 MG) BY MOUTH AT BEDTIME        valACYclovir 1000 mg tablet    VALTREX    4 tablet    TAKE 2 TABS EVERY 12 HRS FOR 1 DAY ONLY FOR OUTBREAKS OF COLD SORES as needed

## 2017-05-30 NOTE — NURSING NOTE
"Chief Complaint   Patient presents with     Cellulitis     bottom of both legs       Initial /62  Pulse 69  Temp 98.6  F (37  C) (Tympanic)  Ht 5' 4\" (1.626 m)  Wt 193 lb 6.4 oz (87.7 kg)  SpO2 95%  BMI 33.2 kg/m2 Estimated body mass index is 33.2 kg/(m^2) as calculated from the following:    Height as of this encounter: 5' 4\" (1.626 m).    Weight as of this encounter: 193 lb 6.4 oz (87.7 kg).  Medication Reconciliation: complete  "

## 2017-06-02 ENCOUNTER — TELEPHONE (OUTPATIENT)
Dept: FAMILY MEDICINE | Facility: CLINIC | Age: 72
End: 2017-06-02

## 2017-06-02 ENCOUNTER — OFFICE VISIT (OUTPATIENT)
Dept: FAMILY MEDICINE | Facility: CLINIC | Age: 72
End: 2017-06-02
Payer: COMMERCIAL

## 2017-06-02 VITALS
HEART RATE: 87 BPM | DIASTOLIC BLOOD PRESSURE: 75 MMHG | OXYGEN SATURATION: 94 % | TEMPERATURE: 97.4 F | BODY MASS INDEX: 31.76 KG/M2 | WEIGHT: 186 LBS | RESPIRATION RATE: 12 BRPM | HEIGHT: 64 IN | SYSTOLIC BLOOD PRESSURE: 107 MMHG

## 2017-06-02 DIAGNOSIS — R11.0 NAUSEA: ICD-10-CM

## 2017-06-02 DIAGNOSIS — L03.115 BILATERAL LOWER LEG CELLULITIS: Primary | ICD-10-CM

## 2017-06-02 DIAGNOSIS — L03.116 BILATERAL LOWER LEG CELLULITIS: Primary | ICD-10-CM

## 2017-06-02 PROCEDURE — 99213 OFFICE O/P EST LOW 20 MIN: CPT | Performed by: PHYSICIAN ASSISTANT

## 2017-06-02 RX ORDER — METOCLOPRAMIDE 5 MG/1
5 TABLET ORAL 4 TIMES DAILY PRN
Qty: 40 TABLET | Refills: 0 | Status: SHIPPED | OUTPATIENT
Start: 2017-06-02 | End: 2017-07-21

## 2017-06-02 RX ORDER — CEPHALEXIN 500 MG/1
500 CAPSULE ORAL 4 TIMES DAILY
Qty: 40 CAPSULE | Refills: 0 | Status: ON HOLD | OUTPATIENT
Start: 2017-06-02 | End: 2017-06-16

## 2017-06-02 NOTE — NURSING NOTE
"Chief Complaint   Patient presents with     Cellulitis     Both lower legs       Initial /75 (BP Location: Left arm, Patient Position: Chair, Cuff Size: Adult Regular)  Pulse 87  Temp 97.4  F (36.3  C) (Tympanic)  Resp 12  Ht 5' 4\" (1.626 m)  Wt 186 lb (84.4 kg)  SpO2 94%  BMI 31.93 kg/m2 Estimated body mass index is 31.93 kg/(m^2) as calculated from the following:    Height as of this encounter: 5' 4\" (1.626 m).    Weight as of this encounter: 186 lb (84.4 kg).  Medication Reconciliation: complete    Jessica Herrera MA  "

## 2017-06-02 NOTE — TELEPHONE ENCOUNTER
Patient called regarding cellulitis of LE and symptoms are worsening    New antibiotic on Tuesday and cellulitis on right leg is slightly worse, moving further up her leg, started in ankle area and now below the knee and is concerned about knee replacement in right leg    Left leg cellulitis has moved from ankle up to upper calf, redness/swelling is not worse just not better    No fever noted, no pain, no change in itching since Tuesday.     Huddled with Ziyad, advised to schedule office visit for evaluation, 11:40 AM 6/2 with Ziyad Holguin RN

## 2017-06-02 NOTE — MR AVS SNAPSHOT
After Visit Summary   6/2/2017    Kavitha Headley    MRN: [id number]           Patient Information     Date Of Birth          1945        Visit Information        Provider Department      6/2/2017 11:40 AM Ziyad [facility]jo [facility]-C [facility]        Today's Diagnoses     Bilateral lower leg cellulitis    -  1    Nausea           Follow-ups after your visit        Your next 10 appointments already scheduled     Jun 16, 2017  4:15 PM CDT   New Visit with Nayeli Bartholomew MD   [facility] [place] ([facility])    [address] [place] [id number] [phone]              Who to contact     If you have questions or need follow up information about today's clinic visit or your schedule please contact [facility] directly at [phone].  Normal or non-critical lab and imaging results will be communicated to you by MyChart, letter or phone within 4 business days after the clinic has received the results. If you do not hear from us within 7 days, please contact the clinic through MyChart or phone. If you have a critical or abnormal lab result, we will notify you by phone as soon as possible.  Submit refill requests through [employer] or call your pharmacy and they will forward the refill request to us. Please allow 3 business days for your refill to be completed.          Additional Information About Your Visit        MyChart Information     [employer] gives you secure access to your electronic health record. If you see a primary care provider, you can also send messages to your care team and make appointments. If you have questions, please call your primary care clinic.  If you do not have a primary care provider, please call [phone] and they will assist you.        Care EveryWhere ID     This is your Care EveryWhere ID. This could be used by other organizations to access your [facility]  "records  [id number]        Your Vitals Were     Pulse Temperature Respirations Height Pulse Oximetry BMI (Body Mass Index)    87 97.4  F (36.3  C) (Tympanic) 12 5' 4\" (1.626 m) 94% 31.93 kg/m2       Blood Pressure from Last 3 Encounters:   06/02/17 107/75   05/30/17 112/62   05/26/17 104/78    Weight from Last 3 Encounters:   06/02/17 186 lb (84.4 kg)   05/30/17 193 lb 6.4 oz (87.7 kg)   05/26/17 187 lb (84.8 kg)              Today, you had the following     No orders found for display         Today's Medication Changes          These changes are accurate as of: 6/2/17 11:59 PM.  If you have any questions, ask your nurse or doctor.               Start taking these medicines.        Dose/Directions    cephALEXin 500 MG capsule   Commonly known as:  KEFLEX   Used for:  Bilateral lower leg cellulitis   Started by:  Ziyad Alejo PA-C        Dose:  500 mg   Take 1 capsule (500 mg) by mouth 4 times daily for 10 days   Quantity:  40 capsule   Refills:  0       metoclopramide 5 MG tablet   Commonly known as:  REGLAN   Used for:  Nausea   Started by:  Ziyad Alejo PA-C        Dose:  5 mg   Take 1 tablet (5 mg) by mouth 4 times daily as needed   Quantity:  40 tablet   Refills:  0            Where to get your medicines      These medications were sent to [facility]/pharmacy #[id number] - [place] - [address] AT [place] [address]  [address] [address]     Phone:  [phone]     cephALEXin 500 MG capsule    metoclopramide 5 MG tablet                Primary Care Provider Office Phone # Fax #    Alison Pena [phone]       [facility] [place] [address] [place] [address] [address]  [place] [address]        Thank you!     Thank you for choosing [facility] [place]  for your care. Our goal is always to provide you with excellent care. Hearing back from our patients is one way we can continue to improve our services. Please take a few minutes to complete the " written survey that you may receive in the mail after your visit with us. Thank you!             Your Updated Medication List - Protect others around you: Learn how to safely use, store and throw away your medicines at www.disposemymeds.org.          This list is accurate as of: 6/2/17 11:59 PM.  Always use your most recent med list.                   Brand Name Dispense Instructions for use    aspirin 81 MG tablet      Take 81 mg by mouth daily       atorvastatin 40 MG tablet    LIPITOR    90 tablet    Take 1 tablet (40 mg) by mouth daily       CENTRUM SILVER per tablet      Take 1 tablet by mouth daily       cephALEXin 500 MG capsule    KEFLEX    40 capsule    Take 1 capsule (500 mg) by mouth 4 times daily for 10 days       furosemide 20 MG tablet    LASIX    90 tablet    Take 1 tablet (20 mg) by mouth daily       gabapentin 300 MG capsule    NEURONTIN    270 capsule    Take 1 capsule (300 mg) by mouth 3 times daily       metoclopramide 5 MG tablet    REGLAN    40 tablet    Take 1 tablet (5 mg) by mouth 4 times daily as needed       metoprolol 25 MG 24 hr tablet    TOPROL XL    90 tablet    Take 1 tablet (25 mg) by mouth daily       minocycline 100 MG capsule    MINOCIN/DYNACIN    20 capsule    Take 1 capsule (100 mg) by mouth 2 times daily for 10 days       mirtazapine 45 MG tablet    REMERON    90 tablet    Take 1 tablet (45 mg) by mouth At Bedtime       naproxen 500 MG tablet    NAPROSYN    90 tablet    Take 1 tablet (500 mg) by mouth 2 times daily as needed for moderate pain       temazepam 15 MG capsule    RESTORIL    30 capsule    TAKE 1 CAPSULES BY MOUTH AT BETIME AS NEEDED TO SLEEP. MUST LAST 30 DAYS.       traMADol 50 MG tablet    ULTRAM    60 tablet    TAKE 1 TABLET BY MOUTH TWICE DAILY AS NEEDED FOR MODERATE PAIN       traZODone 100 MG tablet    DESYREL    90 tablet    TAKE 1 TABLET (100 MG) BY MOUTH AT BEDTIME       valACYclovir 1000 mg tablet    VALTREX    4 tablet    TAKE 2 TABS EVERY 12 HRS FOR 1  DAY ONLY FOR OUTBREAKS OF COLD SORES as needed

## 2017-06-02 NOTE — PROGRESS NOTES
SUBJECTIVE:                                                    Kavitha Headley is a 71 year old female who presents to clinic today for the following health issues:      Concern - Cellulitis     Onset: Around May 26th     Description:   Both lower legs have redness, feel warm, and very itchy. Saw Dr. Mccurdy 5/26/17 and Dr. Pena following Tuesday, switched antibiotics.Currently taking Minocycline but has noted that the redness on her right leg has extended now almost to her knee despite antibiotic use.  No fever, chills, n/v/d.    Intensity: severe    Progression of Symptoms:  worsening    Accompanying Signs & Symptoms:  Itchy, Red, Warm, little tingly and Swollen.       Previous history of similar problem:   Yes    Precipitating factors:   Worsened by: No    Alleviating factors:  Improved by: No       Therapies Tried and outcome: Tried two antibiotics and both have not been helping. Tried cortisone, anti-itch benadryl and it still did not help.            Problem list and histories reviewed & adjusted, as indicated.  Additional history: as documented    Patient Active Problem List   Diagnosis     Hyperlipidemia LDL goal <130     Spinal stenosis     Chronic insomnia     Alcohol abuse     CKD (chronic kidney disease) stage 3, GFR 30-59 ml/min     Hip joint replacement status     Knee joint replacement status     Chronic pain syndrome     Bariatric surgery status     Personal history of urinary calculi     Macrocytic anemia     Anxiety     Aortic stenosis     Left-sided low back pain without sciatica     ACP (advance care planning)     PAC (premature atrial contraction)     Gastroesophageal reflux disease, esophagitis presence not specified     Controlled substance agreement signed     Seasonal affective disorder (H)     HTN, goal below 140/90     Past Surgical History:   Procedure Laterality Date     APPENDECTOMY  3/2004    incidental     C GASTRIC BYPASS,OBESE<100CM ARIANNA-EN-Y  1996     C MEDIASTINOSCOPY W OR WO  BIOPSY  2/2008    Videomediastinoscopy and, for mediastinal adenopathy -reactive lymphoid hyperplasia     C REPAIR OF RECTOCELE  3/2012     C TOTAL KNEE ARTHROPLASTY  12/2005    left      CARPAL TUNNEL RELEASE RT/LT  10/2010    Carpometacarpal excisional arthroplasty with a fascial autograft and APL suspension sling (34073). 2. Left thumb metacarpophalangeal joint fusion with autologous bone graft (70010). 3. Left endoscopic carpal tunnel release      CHOLECYSTECTOMY, LAPOROSCOPIC  11/2010    Cholecystectomy, Laparoscopic     COLONOSCOPY N/A 9/8/2016    Procedure: COMBINED COLONOSCOPY, SINGLE OR MULTIPLE BIOPSY/POLYPECTOMY BY BIOPSY;  Surgeon: Moe Barlow MD;  Location:  GI     CYSTOCELE REPAIR  11/2012    davinc laparoscopic sacrocolpopexy, enterocele repair, lysis of adhesions, placement of retropubic mid urethral sling, cystoscopy     CYSTOSCOPY, LITHOTRIPSY, COMBINED  6/2006    Left extracorporeal shock wave lithotripsy, cystoscopy, left ureteral stent placement.     CYSTOSCOPY, REMOVE STENT(S), COMBINED  7/2006    Cystoscopy, removal of left ureteral stent, retrograde pyelography, flexible and rigid ureteroscopy and holmium laser lithotripsy, basket removal of stone fragments, ureteral stent placement.      HERNIA REPAIR  4/2012    bilateral augmentation mastopexy, ventral hernia repair, and medial thigh liposuction on 04/06/2012.      HYSTERECTOMY VAGINAL, BILATERAL SALPINGO-OOPHERECTOMY, COMBINED  1998    due to myoma and bleeding     JOINT REPLACEMENT, HIP RT/LT  4/2004    right total hip arthroplasty     LAPAROTOMY, LYSIS ADHESIONS, COMBINED  3/2004    lysis adhesions, ventral hernia repair, appendectomy incidentally     LYMPH NODE BIOPSY  4/2008    right axillary, reactive follicular and paracortical hyperplasia.     MAMMOPLASTY AUGMENTATION BILATERAL  4/2012     REVISE RECONSTRUCTED BREAST  6/7/2012    Left breast capsulotomy.        Social History   Substance Use Topics     Smoking status: Never  Smoker     Smokeless tobacco: Never Used     Alcohol use 0.0 oz/week     0 Standard drinks or equivalent per week      Comment: 2-3 drinks per day     Family History   Problem Relation Age of Onset     Substance Abuse Father      CANCER Father      throat and lung mets     DIABETES No family hx of      Coronary Artery Disease No family hx of      CEREBROVASCULAR DISEASE No family hx of          Current Outpatient Prescriptions   Medication Sig Dispense Refill     cephALEXin (KEFLEX) 500 MG capsule Take 1 capsule (500 mg) by mouth 4 times daily for 10 days 40 capsule 0     metoclopramide (REGLAN) 5 MG tablet Take 1 tablet (5 mg) by mouth 4 times daily as needed 40 tablet 0     minocycline (MINOCIN/DYNACIN) 100 MG capsule Take 1 capsule (100 mg) by mouth 2 times daily for 10 days 20 capsule 0     furosemide (LASIX) 20 MG tablet Take 1 tablet (20 mg) by mouth daily 90 tablet 1     traZODone (DESYREL) 100 MG tablet TAKE 1 TABLET (100 MG) BY MOUTH AT BEDTIME 90 tablet 1     gabapentin (NEURONTIN) 300 MG capsule Take 1 capsule (300 mg) by mouth 3 times daily 270 capsule 1     valACYclovir (VALTREX) 1000 mg tablet TAKE 2 TABS EVERY 12 HRS FOR 1 DAY ONLY FOR OUTBREAKS OF COLD SORES as needed 4 tablet 3     temazepam (RESTORIL) 15 MG capsule TAKE 1 CAPSULES BY MOUTH AT BETIME AS NEEDED TO SLEEP. MUST LAST 30 DAYS. 30 capsule 5     mirtazapine (REMERON) 45 MG tablet Take 1 tablet (45 mg) by mouth At Bedtime 90 tablet 1     traMADol (ULTRAM) 50 MG tablet TAKE 1 TABLET BY MOUTH TWICE DAILY AS NEEDED FOR MODERATE PAIN 60 tablet 3     naproxen (NAPROSYN) 500 MG tablet Take 1 tablet (500 mg) by mouth 2 times daily as needed for moderate pain 90 tablet 1     metoprolol (TOPROL XL) 25 MG 24 hr tablet Take 1 tablet (25 mg) by mouth daily 90 tablet 3     atorvastatin (LIPITOR) 40 MG tablet Take 1 tablet (40 mg) by mouth daily 90 tablet 1     Multiple Vitamins-Minerals (CENTRUM SILVER) per tablet Take 1 tablet by mouth daily        "aspirin 81 MG tablet Take 81 mg by mouth daily       Allergies   Allergen Reactions     Bactrim [Sulfamethoxazole W/Trimethoprim] Hives     Codeine Itching     NAUSEA     Morphine Itching     NAUSEA       Reviewed and updated as needed this visit by clinical staff       Reviewed and updated as needed this visit by Provider         ROS:  Constitutional, HEENT, cardiovascular, pulmonary, gi and gu systems are negative, except as otherwise noted.    OBJECTIVE:                                                    /75 (BP Location: Left arm, Patient Position: Chair, Cuff Size: Adult Regular)  Pulse 87  Temp 97.4  F (36.3  C) (Tympanic)  Resp 12  Ht 5' 4\" (1.626 m)  Wt 186 lb (84.4 kg)  SpO2 94%  BMI 31.93 kg/m2  Body mass index is 31.93 kg/(m^2).  GENERAL: healthy, alert and no distress  MS: no gross musculoskeletal defects noted, no edema  SKIN:  Bilateral lower legs with erythema and edema, R>L, erythema extends to midcalf of left leg, and just distal to knee of right leg. Areas are warm and TTP.  PSYCH: mentation appears normal, affect normal/bright    Diagnostic Test Results:  none      ASSESSMENT/PLAN:                                                        1. Bilateral lower leg cellulitis  Cellulitis noted to LE bilaterally R>L.  Today, erythema extends along right leg to just underneath her knee.  I have advised that she monitor her symptoms closely, and if fever, vomiting or spreading erythema over the weekend, she needs to be evaluated in the ED.  She is very well appearing today.  Legs were marked, and I will add keflex for better strep coverage to her current minocycline which she may continue at this time.  - cephALEXin (KEFLEX) 500 MG capsule; Take 1 capsule (500 mg) by mouth 4 times daily for 10 days  Dispense: 40 capsule; Refill: 0    2. Nausea  Complaining of nausea with antibiotics, has used reglan in the past with good effect.  Uses and SE discussed, script given for PRN use for nausea with " antibiotic use.  - metoclopramide (REGLAN) 5 MG tablet; Take 1 tablet (5 mg) by mouth 4 times daily as needed  Dispense: 40 tablet; Refill: 0    Follow-Up: As above    Ziyad Alejo PA-C  [facility]

## 2017-06-04 ENCOUNTER — TELEPHONE (OUTPATIENT)
Dept: FAMILY MEDICINE | Facility: CLINIC | Age: 72
End: 2017-06-04

## 2017-06-04 DIAGNOSIS — E87.6 HYPOKALEMIA: Primary | ICD-10-CM

## 2017-06-04 PROBLEM — L03.115 BILATERAL LOWER LEG CELLULITIS: Status: ACTIVE | Noted: 2017-06-04

## 2017-06-04 PROBLEM — L03.116 BILATERAL LOWER LEG CELLULITIS: Status: ACTIVE | Noted: 2017-06-04

## 2017-06-04 PROBLEM — E87.1 HYPONATREMIA: Status: ACTIVE | Noted: 2017-06-04

## 2017-06-04 RX ORDER — POTASSIUM CHLORIDE 1500 MG/1
20 TABLET, EXTENDED RELEASE ORAL DAILY
Qty: 30 TABLET | Refills: 3 | Status: ON HOLD | OUTPATIENT
Start: 2017-06-04 | End: 2017-06-16

## 2017-06-05 NOTE — TELEPHONE ENCOUNTER
"\"Number cannot be completed as dialed\"-    Sent a [employer] message to call clinic and ask for triage.    Essence Rodas RN  [facility]  [phone]    "

## 2017-06-05 NOTE — TELEPHONE ENCOUNTER
Triage - can you check and make sure Kavitha is doing better regarding her cellulitis? She has seen myself, Dr. Pena, and Ziyad now. I'm also concerned about her electrolytes and she needs potassium replacement. I'm going to send her [facility] [place].

## 2017-06-06 ENCOUNTER — APPOINTMENT (OUTPATIENT)
Dept: ULTRASOUND IMAGING | Facility: CLINIC | Age: 72
DRG: 432 | End: 2017-06-06
Attending: EMERGENCY MEDICINE
Payer: MEDICARE

## 2017-06-06 ENCOUNTER — HOSPITAL ENCOUNTER (INPATIENT)
Facility: CLINIC | Age: 72
LOS: 10 days | Discharge: NURSING, REHAB OR LONG-TERM CARE | DRG: 432 | End: 2017-06-16
Attending: EMERGENCY MEDICINE | Admitting: INTERNAL MEDICINE
Payer: MEDICARE

## 2017-06-06 DIAGNOSIS — D69.6 THROMBOCYTOPENIA (H): Primary | ICD-10-CM

## 2017-06-06 DIAGNOSIS — G89.4 CHRONIC PAIN SYNDROME: ICD-10-CM

## 2017-06-06 DIAGNOSIS — I49.3 PVC'S (PREMATURE VENTRICULAR CONTRACTIONS): ICD-10-CM

## 2017-06-06 DIAGNOSIS — R79.0 LOW MAGNESIUM LEVELS: ICD-10-CM

## 2017-06-06 DIAGNOSIS — K59.00 CONSTIPATION, UNSPECIFIED CONSTIPATION TYPE: ICD-10-CM

## 2017-06-06 DIAGNOSIS — K20.90 ESOPHAGITIS: ICD-10-CM

## 2017-06-06 DIAGNOSIS — L03.115 CELLULITIS OF RIGHT LOWER EXTREMITY: ICD-10-CM

## 2017-06-06 DIAGNOSIS — E87.6 HYPOKALEMIA: ICD-10-CM

## 2017-06-06 DIAGNOSIS — K70.0 ALCOHOLIC FATTY LIVER: ICD-10-CM

## 2017-06-06 DIAGNOSIS — F10.10 ALCOHOL ABUSE: ICD-10-CM

## 2017-06-06 DIAGNOSIS — Z79.899 CONTROLLED SUBSTANCE AGREEMENT SIGNED: ICD-10-CM

## 2017-06-06 DIAGNOSIS — F41.9 ANXIETY: ICD-10-CM

## 2017-06-06 DIAGNOSIS — R79.89 ELEVATED LFTS: ICD-10-CM

## 2017-06-06 DIAGNOSIS — F32.A DEPRESSION, UNSPECIFIED DEPRESSION TYPE: ICD-10-CM

## 2017-06-06 DIAGNOSIS — K21.9 GASTROESOPHAGEAL REFLUX DISEASE, ESOPHAGITIS PRESENCE NOT SPECIFIED: ICD-10-CM

## 2017-06-06 PROBLEM — L03.90 CELLULITIS: Status: ACTIVE | Noted: 2017-06-06

## 2017-06-06 LAB
ALBUMIN SERPL-MCNC: 2.6 G/DL (ref 3.4–5)
ALP SERPL-CCNC: 199 U/L (ref 40–150)
ALT SERPL W P-5'-P-CCNC: 160 U/L (ref 0–50)
ANION GAP SERPL CALCULATED.3IONS-SCNC: 18 MMOL/L (ref 3–14)
AST SERPL W P-5'-P-CCNC: 338 U/L (ref 0–45)
BASOPHILS # BLD AUTO: 0.1 10E9/L (ref 0–0.2)
BASOPHILS NFR BLD AUTO: 1.1 %
BILIRUB SERPL-MCNC: 2.3 MG/DL (ref 0.2–1.3)
BUN SERPL-MCNC: 20 MG/DL (ref 7–30)
CALCIUM SERPL-MCNC: 8.8 MG/DL (ref 8.5–10.1)
CHLORIDE SERPL-SCNC: 80 MMOL/L (ref 94–109)
CO2 SERPL-SCNC: 31 MMOL/L (ref 20–32)
CREAT SERPL-MCNC: 0.89 MG/DL (ref 0.52–1.04)
CREAT SERPL-MCNC: 1.02 MG/DL (ref 0.52–1.04)
DIFFERENTIAL METHOD BLD: ABNORMAL
EOSINOPHIL # BLD AUTO: 0.1 10E9/L (ref 0–0.7)
EOSINOPHIL NFR BLD AUTO: 1.3 %
ERYTHROCYTE [DISTWIDTH] IN BLOOD BY AUTOMATED COUNT: 13.9 % (ref 10–15)
GFR SERPL CREATININE-BSD FRML MDRD: 53 ML/MIN/1.7M2
GFR SERPL CREATININE-BSD FRML MDRD: 62 ML/MIN/1.7M2
GLUCOSE SERPL-MCNC: 99 MG/DL (ref 70–99)
HCT VFR BLD AUTO: 34.8 % (ref 35–47)
HGB BLD-MCNC: 12.9 G/DL (ref 11.7–15.7)
IMM GRANULOCYTES # BLD: 0 10E9/L (ref 0–0.4)
IMM GRANULOCYTES NFR BLD: 0.2 %
LYMPHOCYTES # BLD AUTO: 0.7 10E9/L (ref 0.8–5.3)
LYMPHOCYTES NFR BLD AUTO: 10.4 %
MCH RBC QN AUTO: 37.6 PG (ref 26.5–33)
MCHC RBC AUTO-ENTMCNC: 37.1 G/DL (ref 31.5–36.5)
MCV RBC AUTO: 102 FL (ref 78–100)
MONOCYTES # BLD AUTO: 0.7 10E9/L (ref 0–1.3)
MONOCYTES NFR BLD AUTO: 10.7 %
NEUTROPHILS # BLD AUTO: 4.8 10E9/L (ref 1.6–8.3)
NEUTROPHILS NFR BLD AUTO: 76.3 %
NRBC # BLD AUTO: 0 10*3/UL
NRBC BLD AUTO-RTO: 0 /100
OSMOLALITY SERPL: 274 MMOL/KG (ref 280–301)
PLATELET # BLD AUTO: 217 10E9/L (ref 150–450)
POTASSIUM SERPL-SCNC: 2.5 MMOL/L (ref 3.4–5.3)
POTASSIUM SERPL-SCNC: 3.1 MMOL/L (ref 3.4–5.3)
PROT SERPL-MCNC: 6 G/DL (ref 6.8–8.8)
RBC # BLD AUTO: 3.43 10E12/L (ref 3.8–5.2)
SODIUM SERPL-SCNC: 129 MMOL/L (ref 133–144)
WBC # BLD AUTO: 6.3 10E9/L (ref 4–11)

## 2017-06-06 PROCEDURE — 99223 1ST HOSP IP/OBS HIGH 75: CPT | Mod: AI | Performed by: INTERNAL MEDICINE

## 2017-06-06 PROCEDURE — 81001 URINALYSIS AUTO W/SCOPE: CPT | Performed by: INTERNAL MEDICINE

## 2017-06-06 PROCEDURE — A9270 NON-COVERED ITEM OR SERVICE: HCPCS | Mod: GY | Performed by: EMERGENCY MEDICINE

## 2017-06-06 PROCEDURE — 82565 ASSAY OF CREATININE: CPT | Performed by: INTERNAL MEDICINE

## 2017-06-06 PROCEDURE — 93970 EXTREMITY STUDY: CPT

## 2017-06-06 PROCEDURE — 99285 EMERGENCY DEPT VISIT HI MDM: CPT | Mod: 25

## 2017-06-06 PROCEDURE — 87040 BLOOD CULTURE FOR BACTERIA: CPT | Performed by: EMERGENCY MEDICINE

## 2017-06-06 PROCEDURE — 36415 COLL VENOUS BLD VENIPUNCTURE: CPT

## 2017-06-06 PROCEDURE — 83935 ASSAY OF URINE OSMOLALITY: CPT | Performed by: INTERNAL MEDICINE

## 2017-06-06 PROCEDURE — 25000132 ZZH RX MED GY IP 250 OP 250 PS 637: Mod: GY | Performed by: EMERGENCY MEDICINE

## 2017-06-06 PROCEDURE — 96365 THER/PROPH/DIAG IV INF INIT: CPT

## 2017-06-06 PROCEDURE — 85025 COMPLETE CBC W/AUTO DIFF WBC: CPT | Performed by: EMERGENCY MEDICINE

## 2017-06-06 PROCEDURE — 84300 ASSAY OF URINE SODIUM: CPT | Performed by: INTERNAL MEDICINE

## 2017-06-06 PROCEDURE — 25000128 H RX IP 250 OP 636: Performed by: INTERNAL MEDICINE

## 2017-06-06 PROCEDURE — 25000128 H RX IP 250 OP 636: Performed by: EMERGENCY MEDICINE

## 2017-06-06 PROCEDURE — 36415 COLL VENOUS BLD VENIPUNCTURE: CPT | Performed by: INTERNAL MEDICINE

## 2017-06-06 PROCEDURE — 84132 ASSAY OF SERUM POTASSIUM: CPT | Performed by: INTERNAL MEDICINE

## 2017-06-06 PROCEDURE — 12000000 ZZH R&B MED SURG/OB

## 2017-06-06 PROCEDURE — 80053 COMPREHEN METABOLIC PANEL: CPT | Performed by: EMERGENCY MEDICINE

## 2017-06-06 PROCEDURE — 83930 ASSAY OF BLOOD OSMOLALITY: CPT | Performed by: INTERNAL MEDICINE

## 2017-06-06 PROCEDURE — 25000132 ZZH RX MED GY IP 250 OP 250 PS 637: Mod: GY | Performed by: INTERNAL MEDICINE

## 2017-06-06 PROCEDURE — A9270 NON-COVERED ITEM OR SERVICE: HCPCS | Mod: GY | Performed by: INTERNAL MEDICINE

## 2017-06-06 RX ORDER — ACETAMINOPHEN 325 MG/1
650 TABLET ORAL EVERY 4 HOURS PRN
Status: DISCONTINUED | OUTPATIENT
Start: 2017-06-06 | End: 2017-06-07

## 2017-06-06 RX ORDER — MULTIVITAMIN,THERAPEUTIC
1 TABLET ORAL DAILY
Status: DISCONTINUED | OUTPATIENT
Start: 2017-06-07 | End: 2017-06-16 | Stop reason: HOSPADM

## 2017-06-06 RX ORDER — LIDOCAINE 40 MG/G
CREAM TOPICAL
Status: DISCONTINUED | OUTPATIENT
Start: 2017-06-06 | End: 2017-06-16 | Stop reason: HOSPADM

## 2017-06-06 RX ORDER — TEMAZEPAM 7.5 MG/1
7.5 CAPSULE ORAL
Status: DISCONTINUED | OUTPATIENT
Start: 2017-06-06 | End: 2017-06-12

## 2017-06-06 RX ORDER — ONDANSETRON 4 MG/1
4 TABLET, ORALLY DISINTEGRATING ORAL EVERY 6 HOURS PRN
Status: DISCONTINUED | OUTPATIENT
Start: 2017-06-06 | End: 2017-06-16 | Stop reason: HOSPADM

## 2017-06-06 RX ORDER — FUROSEMIDE 10 MG/ML
40 INJECTION INTRAMUSCULAR; INTRAVENOUS ONCE
Status: COMPLETED | OUTPATIENT
Start: 2017-06-06 | End: 2017-06-06

## 2017-06-06 RX ORDER — ASPIRIN 81 MG/1
81 TABLET ORAL DAILY
Status: DISCONTINUED | OUTPATIENT
Start: 2017-06-06 | End: 2017-06-11

## 2017-06-06 RX ORDER — CEFAZOLIN SODIUM 1 G/50ML
1250 SOLUTION INTRAVENOUS EVERY 12 HOURS
Status: DISCONTINUED | OUTPATIENT
Start: 2017-06-07 | End: 2017-06-07

## 2017-06-06 RX ORDER — SODIUM CHLORIDE 9 MG/ML
INJECTION, SOLUTION INTRAVENOUS CONTINUOUS
Status: DISCONTINUED | OUTPATIENT
Start: 2017-06-06 | End: 2017-06-06

## 2017-06-06 RX ORDER — ONDANSETRON 2 MG/ML
4 INJECTION INTRAMUSCULAR; INTRAVENOUS EVERY 6 HOURS PRN
Status: DISCONTINUED | OUTPATIENT
Start: 2017-06-06 | End: 2017-06-16 | Stop reason: HOSPADM

## 2017-06-06 RX ORDER — ATORVASTATIN CALCIUM 40 MG/1
40 TABLET, FILM COATED ORAL DAILY
Status: DISCONTINUED | OUTPATIENT
Start: 2017-06-06 | End: 2017-06-06

## 2017-06-06 RX ORDER — TRAMADOL HYDROCHLORIDE 50 MG/1
50 TABLET ORAL 2 TIMES DAILY PRN
Status: DISCONTINUED | OUTPATIENT
Start: 2017-06-06 | End: 2017-06-15

## 2017-06-06 RX ORDER — MIRTAZAPINE 15 MG/1
45 TABLET, FILM COATED ORAL AT BEDTIME
Status: DISCONTINUED | OUTPATIENT
Start: 2017-06-06 | End: 2017-06-15

## 2017-06-06 RX ORDER — GABAPENTIN 300 MG/1
300 CAPSULE ORAL DAILY
Status: DISCONTINUED | OUTPATIENT
Start: 2017-06-07 | End: 2017-06-16 | Stop reason: HOSPADM

## 2017-06-06 RX ORDER — NALOXONE HYDROCHLORIDE 0.4 MG/ML
.1-.4 INJECTION, SOLUTION INTRAMUSCULAR; INTRAVENOUS; SUBCUTANEOUS
Status: DISCONTINUED | OUTPATIENT
Start: 2017-06-06 | End: 2017-06-16 | Stop reason: HOSPADM

## 2017-06-06 RX ORDER — FOLIC ACID 1 MG/1
1 TABLET ORAL DAILY
Status: DISCONTINUED | OUTPATIENT
Start: 2017-06-06 | End: 2017-06-16 | Stop reason: HOSPADM

## 2017-06-06 RX ORDER — IBUPROFEN 600 MG/1
600 TABLET, FILM COATED ORAL EVERY 6 HOURS PRN
Status: DISCONTINUED | OUTPATIENT
Start: 2017-06-06 | End: 2017-06-09

## 2017-06-06 RX ORDER — GABAPENTIN 600 MG/1
600 TABLET ORAL AT BEDTIME
Status: DISCONTINUED | OUTPATIENT
Start: 2017-06-06 | End: 2017-06-16 | Stop reason: HOSPADM

## 2017-06-06 RX ORDER — VANCOMYCIN HYDROCHLORIDE 1 G/200ML
1000 INJECTION, SOLUTION INTRAVENOUS ONCE
Status: COMPLETED | OUTPATIENT
Start: 2017-06-06 | End: 2017-06-06

## 2017-06-06 RX ORDER — TRAZODONE HYDROCHLORIDE 100 MG/1
100 TABLET ORAL AT BEDTIME
Status: DISCONTINUED | OUTPATIENT
Start: 2017-06-06 | End: 2017-06-15

## 2017-06-06 RX ORDER — POTASSIUM CHLORIDE 1.5 G/1.58G
40 POWDER, FOR SOLUTION ORAL ONCE
Status: COMPLETED | OUTPATIENT
Start: 2017-06-06 | End: 2017-06-06

## 2017-06-06 RX ORDER — METOPROLOL SUCCINATE 25 MG/1
25 TABLET, EXTENDED RELEASE ORAL DAILY
Status: DISCONTINUED | OUTPATIENT
Start: 2017-06-07 | End: 2017-06-09

## 2017-06-06 RX ADMIN — TRAZODONE HYDROCHLORIDE 100 MG: 100 TABLET ORAL at 23:02

## 2017-06-06 RX ADMIN — VANCOMYCIN HYDROCHLORIDE 1000 MG: 1 INJECTION, SOLUTION INTRAVENOUS at 18:20

## 2017-06-06 RX ADMIN — ASPIRIN 81 MG: 81 TABLET, COATED ORAL at 23:02

## 2017-06-06 RX ADMIN — TEMAZEPAM 7.5 MG: 7.5 CAPSULE ORAL at 23:02

## 2017-06-06 RX ADMIN — FUROSEMIDE 40 MG: 10 INJECTION, SOLUTION INTRAVENOUS at 23:03

## 2017-06-06 RX ADMIN — POTASSIUM CHLORIDE 40 MEQ: 1.5 POWDER, FOR SOLUTION ORAL at 17:55

## 2017-06-06 RX ADMIN — VANCOMYCIN HYDROCHLORIDE 500 MG: 500 INJECTION, POWDER, LYOPHILIZED, FOR SOLUTION INTRAVENOUS at 23:09

## 2017-06-06 RX ADMIN — ENOXAPARIN SODIUM 40 MG: 40 INJECTION SUBCUTANEOUS at 23:03

## 2017-06-06 RX ADMIN — GABAPENTIN 600 MG: 600 TABLET, FILM COATED ORAL at 23:02

## 2017-06-06 RX ADMIN — MIRTAZAPINE 45 MG: 15 TABLET, FILM COATED ORAL at 23:02

## 2017-06-06 ASSESSMENT — ENCOUNTER SYMPTOMS
CHILLS: 0
NAUSEA: 0
COUGH: 0
COLOR CHANGE: 1
UNEXPECTED WEIGHT CHANGE: 0
APPETITE CHANGE: 1
TREMORS: 0
SHORTNESS OF BREATH: 1
VOMITING: 0
DIARRHEA: 0
FEVER: 0
WOUND: 0

## 2017-06-06 NOTE — IP AVS SNAPSHOT
"` `     [place] [address] [facility]: [phone]            Medication Administration Report for Kavitha Headley as of 06/16/17 1105   Legend:    Given Hold Not Given Due Canceled Entry Other Actions    Time Time (Time) Time  Time-Action       Inactive    Active    Linked        Medications 06/10/17 06/11/17 06/12/17 06/13/17 06/14/17 06/15/17 06/16/17    folic acid (FOLVITE) tablet 1 mg  Dose: 1 mg Freq: DAILY Route: PO  Start: 06/06/17 2115    0910 (1 mg)-Given        0934 (1 mg)-Given        0935 (1 mg)-Given        0859 (1 mg)-Given        0850 (1 mg)-Given        0957 (1 mg)-Given        0825 (1 mg)-Given           gabapentin (NEURONTIN) capsule 300 mg  Dose: 300 mg Freq: DAILY Route: PO  Start: 06/07/17 0900    0910 (300 mg)-Given        0934 (300 mg)-Given        0934 (300 mg)-Given        0857 (300 mg)-Given        0850 (300 mg)-Given        0957 (300 mg)-Given        0825 (300 mg)-Given           gabapentin (NEURONTIN) tablet 600 mg  Dose: 600 mg Freq: AT BEDTIME Route: PO  Start: 06/06/17 2200    2210 (600 mg)-Given        2154 (600 mg)-Given        2209 (600 mg)-Given        2233 (600 mg)-Given        2204 (600 mg)-Given        2143 (600 mg)-Given        [ ] 2200           lactulose (CHRONULAC) solution 10 g  Dose: 10 g Freq: 2 TIMES DAILY Route: PO  Start: 06/13/17 0900       0900 (10 g)-Given       2035 (10 g)-Given        0852 (10 g)-Given       2204 (10 g)-Given        0956 (10 g)-Given       2143 (10 g)-Given        0826 (10 g)-Given       [ ] 2100           lidocaine (LMX4) cream  Freq: EVERY 1 HOUR PRN Route: Top  PRN Reason: pain  PRN Comment: with VAD insertion or accessing implanted port.  Start: 06/06/17 2054   Admin Instructions: Do NOT give if patient has a history of allergy to any local anesthetic or any \"rubia\" product.   Apply 30 minutes prior to VAD insertion or port access.  MAX Dose:  2.5 g (  of 5 g tube)               lidocaine 1 % 0.5-5 mL  Dose: 0.5-5 mL Freq: " "EVERY 1 HOUR PRN Route: OTHER  PRN Comment: mild pain with VAD insertion or accessing implanted port.  Start: 06/14/17 1111   Admin Instructions: Do NOT give if patient has a history of allergy to any local anesthetic or any \"rubia\" product. MAX dose 1 mL subcutaneous OR intradermal in divided doses.         (1200)-Not Given             lidocaine 1 % 1 mL  Dose: 1 mL Freq: EVERY 1 HOUR PRN Route: OTHER  PRN Comment: mild pain with VAD insertion or accessing implanted port  Start: 06/06/17 2054   Admin Instructions: Do NOT give if patient has a history of allergy to any local anesthetic or any \"rubia\" product. MAX dose 1 mL subcutaneous OR intradermal in divided doses.               magnesium oxide (MAG-OX) tablet 400 mg  Dose: 400 mg Freq: DAILY Route: PO  Start: 06/10/17 1445    1635 (400 mg)-Given        0934 (400 mg)-Given        0935 (400 mg)-Given        0858 (400 mg)-Given        0849 (400 mg)-Given        0957 (400 mg)-Given        0825 (400 mg)-Given           magnesium sulfate 4 g in 100 mL sterile water (premade)  Dose: 4 g Freq: EVERY 4 HOURS PRN Route: IV  PRN Reason: magnesium supplementation  Start: 06/07/17 1030   Admin Instructions: For serum Mg++ less than 1.6 mg/dL  Give 4 g and recheck magnesium level 2 hours after dose, and next AM.               metoclopramide (REGLAN) injection 5 mg  Dose: 5 mg Freq: EVERY 6 HOURS PRN Route: IV  PRN Reasons: nausea,vomiting  Start: 06/15/17 0255   Admin Instructions: Avoid use if patient has full bowel obstruction or perforation.  Irritant.          0315 (5 mg)-Given            metoprolol (TOPROL-XL) half-tab 12.5 mg  Dose: 12.5 mg Freq: DAILY Route: PO  Start: 06/10/17 0900   Admin Instructions: DO NOT CRUSH. Tablet may be split in half along score line; hold it if SBP<110 or HR<55     0910 (12.5 mg)-Given        0933 (12.5 mg)-Given        0936 (12.5 mg)-Given        0858 (12.5 mg)-Given        0856 (12.5 mg)-Given        0959-Hold        0825 (12.5 " mg)-Given           miconazole (MICATIN; MICRO GUARD) 2 % powder  Freq: EVERY 1 HOUR PRN Route: Top  PRN Reason: other  PRN Comment: topical candidiasis  Start: 06/13/17 2030   Admin Instructions: Apply to affected area.          2233 ( )-Given              mirtazapine (REMERON) tablet 15 mg  Dose: 15 mg Freq: AT BEDTIME Route: PO  Start: 06/15/17 2200         2143 (15 mg)-Given        [ ] 2200           multivitamin, therapeutic (THERA-VIT) tablet 1 tablet  Dose: 1 tablet Freq: DAILY Route: PO  Start: 06/07/17 0900    0911 (1 tablet)-Given        0934 (1 tablet)-Given        0935 (1 tablet)-Given        0859 (1 tablet)-Given        0850 (1 tablet)-Given        0957 (1 tablet)-Given        0825 (1 tablet)-Given           naloxone (NARCAN) injection 0.1-0.4 mg  Dose: 0.1-0.4 mg Freq: EVERY 2 MIN PRN Route: IV  PRN Reason: opioid reversal  Start: 06/06/17 2053   Admin Instructions: For respiratory rate LESS than or EQUAL to 8.  Partial reversal dose:  0.1 mg titrated q 2 minutes for Analgesia Side Effects Monitoring Sedation Level of 3 (frequently drowsy, arousable, drifts to sleep during conversation).Full reversal dose:  0.4 mg bolus for Analgesia Side Effects Monitoring Sedation Level of 4 (somnolent, minimal or no response to stimulation).               ondansetron (ZOFRAN-ODT) ODT tab 4 mg  Dose: 4 mg Freq: EVERY 6 HOURS PRN Route: PO  PRN Reason: nausea  Start: 06/06/17 2056   Admin Instructions: This is Step 1 of nausea and vomiting management.  If nausea not resolved in 15 minutes, go to Step 2 prochlorperazine (COMPAZINE). Do not push through foil backing. Peel back foil and gently remove. Place on tongue immediately. Administration with liquid unnecessary              Or  ondansetron (ZOFRAN) injection 4 mg  Dose: 4 mg Freq: EVERY 6 HOURS PRN Route: IV  PRN Reasons: nausea,vomiting  Start: 06/06/17 2056   Admin Instructions: This is Step 1 of nausea and vomiting management.  If nausea not resolved in 15  minutes, go to Step 2 prochlorperazine (COMPAZINE).  Irritant.               polyethylene glycol (MIRALAX/GLYCOLAX) Packet 17 g  Dose: 17 g Freq: DAILY PRN Route: PO  PRN Comment: constipation  Start: 06/15/17 1156   Admin Instructions: 1 Packet = 17 grams. Mixed prescribed dose in 8 ounces of water. Follow with 8 oz. of water.               potassium chloride (KLOR-CON) Packet 20-40 mEq  Dose: 20-40 mEq Freq: EVERY 2 HOURS PRN Route: ORAL OR FEED  PRN Reason: potassium supplementation  Start: 06/07/17 1030   Admin Instructions: Use if unable to tolerate tablets.  If Serum K+ 3.0-3.3, dose = 60 mEq po total dose (40 mEq x1 followed in 2 hours by 20 mEq x1). Recheck K+ level 4 hours after dose and the next AM.  If Serum K+ 2.5-2.9, dose = 80 mEq po total dose (40 mEq Q2H x2). Recheck K+ level 4 hours after dose and the next AM.  If Serum K+ less than 2.5, See IV order.  Dissolve packet contents in 4-8 ounces of cold water or juice.               potassium chloride 10 mEq in 100 mL intermittent infusion  Dose: 10 mEq Freq: EVERY 1 HOUR PRN Route: IV  PRN Reason: potassium supplementation  Start: 06/07/17 1030   Admin Instructions: Infuse via PERIPHERAL LINE or CENTRAL LINE. Use for central line replacement if patient weight less than 65 kg, if patient is on TPN with high potassium content or if unit does not stock 20 mEq bags.   If Serum K+ 3.0-3.3, dose = 10 mEq/hr x4 doses (40 mEq IV total dose). Recheck K+ level 2 hours after dose and the next AM.   If Serum K+ less than 3.0, dose = 10 mEq/hr x6 doses (60 mEq IV total dose). Recheck K+ level 2 hours after dose and the next AM.               potassium chloride 10 mEq in 100 mL intermittent infusion with 10 mg lidocaine  Dose: 10 mEq Freq: EVERY 1 HOUR PRN Route: IV  PRN Reason: potassium supplementation  Start: 06/07/17 1030   Admin Instructions: Infuse via PERIPHERAL LINE. Use potassium with lidocaine for pain with peripheral administration.  If Serum K+ 3.0-3.3,  dose = 10 mEq/hr x4 doses (40 mEq IV total dose). Recheck K+ level 2 hours after dose and the next AM.  If Serum K+ less than 3.0, dose = 10 mEq/hr x6 doses (60 mEq IV total dose). Recheck K+ level 2 hours after dose and the next AM.               potassium chloride 20 mEq in 50 mL intermittent infusion  Dose: 20 mEq Freq: EVERY 1 HOUR PRN Route: IV  PRN Reason: potassium supplementation  Start: 06/07/17 1030   Admin Instructions: Infuse via CENTRAL LINE Only. May need EKG if less than 65 kg or on TPN - Max rate is 0.3 mEq/kg/hr for patients not on EKG monitoring.   If Serum K+ 3.0-3.3, dose = 20 mEq/hr x2 doses (40 mEq IV total dose). Recheck K+ level 2 hours after dose and the next AM.  If Serum K+ less than 3.0, dose = 20 mEq/hr x3 doses (60 mEq IV total dose). Recheck K+ level 2 hours after dose and the next AM.               potassium chloride SA (K-DUR/KLOR-CON M) CR tablet 20-40 mEq  Dose: 20-40 mEq Freq: EVERY 2 HOURS PRN Route: PO  PRN Reason: potassium supplementation  Start: 06/07/17 1030   Admin Instructions: Use if able to take PO.   If Serum K+ 3.0-3.3, dose = 60 mEq po total dose (40 mEq x1 followed in 2 hours by 20 mEq x1). Recheck K+ level 4 hours after dose and the next AM.  If Serum K+ 2.5-2.9, dose = 80 mEq po total dose (40 mEq Q2H x2). Recheck K+ level 4 hours after dose and the next AM.  If Serum K+ less than 2.5, See IV order.  DO NOT CRUSH               QUEtiapine (SEROquel) half-tab 12.5 mg  Dose: 12.5 mg Freq: 3 TIMES DAILY PRN Route: PO  PRN Comment: anxiety  Start: 06/15/17 0742          0305 (12.5 mg)-Given           QUEtiapine (SEROquel) tablet 25 mg  Dose: 25 mg Freq: AT BEDTIME Route: PO  Start: 06/15/17 2200         2143 (25 mg)-Given        [ ] 2200           ranitidine (ZANTAC) tablet 150 mg  Dose: 150 mg Freq: 2 TIMES DAILY Route: PO  Start: 06/11/17 2100 2154 (150 mg)-Given        0935 (150 mg)-Given       2209 (150 mg)-Given        0858 (150 mg)-Given       2035 (150  mg)-Given        0850 (150 mg)-Given       2204 (150 mg)-Given        0957 (150 mg)-Given       2143 (150 mg)-Given        0825 (150 mg)-Given       [ ] 2100           senna-docusate (SENOKOT-S;PERICOLACE) 8.6-50 MG per tablet 1-2 tablet  Dose: 1-2 tablet Freq: 2 TIMES DAILY PRN Route: PO  PRN Reason: constipation  Start: 06/15/17 1156              sodium chloride (PF) 0.9% PF flush 10-20 mL  Dose: 10-20 mL Freq: EVERY 1 HOUR PRN Route: IK  PRN Reasons: line flush,post meds or blood draw  PRN Comment: to flush each peripheral midline IV catheter lumen  Start: 06/14/17 1111   Admin Instructions: 10 mL post IV meds;  20 mL post blood draw         1200 (20 mL)-Given             sodium chloride (PF) 0.9% PF flush 3 mL  Dose: 3 mL Freq: EVERY 8 HOURS Route: IK  Start: 06/06/17 2115   Admin Instructions: And Q1H PRN, to lock peripheral IV dormant line.     0915 (3 mL)-Given       1642 (3 mL)-Given       (2334)-Not Given        (0951)-Not Given       1600 (3 mL)-Given        (0137)-Not Given       0937 (3 mL)-Given       1600 (3 mL)-Given During Downtime       2338 (3 mL)-Given        0900 (3 mL)-Given       1615 (3 mL)-Given        0521 (3 mL)-Given       0859 (3 mL)-Given       1639 (3 mL)-Given        0118 (3 mL)-Given       0856 (3 mL)-Given       1658 (3 mL)-Given        0044 (3 mL)-Given       0831 (3 mL)-Given       [ ] 1600           sodium chloride (PF) 0.9% PF flush 3 mL  Dose: 3 mL Freq: EVERY 1 HOUR PRN Route: IK  PRN Reason: line flush  PRN Comment: for peripheral IV flush post IV meds  Start: 06/06/17 2054    2218 (3 mL)-Given                 sucralfate (CARAFATE) suspension 1 g  Dose: 1 g Freq: 4 TIMES DAILY BEFORE MEALS & NIGHTLY Route: PO  Start: 06/12/17 2200   Admin Instructions: Shake well. Recommended to take before meals.       2209 (1 g)-Given        0637 (1 g)-Given       1207 (1 g)-Given       1622 (1 g)-Given       2233 (1 g)-Given        0852 (1 g)-Given       (1326)-Not Given [C]       1638 (1  g)-Given       2204 (1 g)-Given        0856 (1 g)-Given       1145 (1 g)-Given       1658 (1 g)-Given       2144 (1 g)-Given        0649 (1 g)-Given       [ ] 1130       [ ] 1630       [ ] 2200           thiamine tablet 100 mg  Dose: 100 mg Freq: DAILY Route: PO  Start: 06/10/17 1445    1635 (100 mg)-Given        0950 (100 mg)-Given        0935 (100 mg)-Given        0859 (100 mg)-Given        0850 (100 mg)-Given        0957 (100 mg)-Given        0825 (100 mg)-Given           traMADol (ULTRAM) half-tab 25 mg  Dose: 25 mg Freq: 2 TIMES DAILY PRN Route: PO  PRN Reason: moderate pain  Start: 06/15/17 0743          0305 (25 mg)-Given           vortioxetine (TRINTELLIX/BRINTELLIX) tablet 5 mg  Dose: 5 mg Freq: DAILY Route: PO  Start: 06/09/17 1115    0910 (5 mg)-Given        0934 (5 mg)-Given        0935 (5 mg)-Given        0858 (5 mg)-Given        0849 (5 mg)-Given        0957 (5 mg)-Given        0826 (5 mg)-Given          Completed Medications  Medications 06/10/17 06/11/17 06/12/17 06/13/17 06/14/17 06/15/17 06/16/17         Dose: 25 g Freq: 2 TIMES DAILY Route: IV  Last Dose: 25 g (06/13/17 1615)  Start: 06/13/17 1415   End: 06/14/17 1639   Admin Instructions: Infusion rates should be adjusted based on patient condition and response.  - For shock/hypovolemia, infuse as rapidly as tolerated. For patients with cardiac or circulatory disease at risk for rapid blood pressure increases, do not exceed 5-10 mL/min.   - For patients with normal plasma volume, do not exceed 1-2 mL/min to avoid circulatory overload and pulmonary edema.   - For procedure specific rates, please refer to procedure protocol.        1615 (25 g)-New Bag        1214 (25 g)-New Bag [C]       1639 (25 g)-New Bag               Dose: 300 mg Freq: EVERY 8 HOURS SCHEDULED Route: PO  Indications of Use: SKIN AND SOFT TISSUE INFECTION  Start: 06/09/17 1400   End: 06/13/17 2233    0559 (300 mg)-Given       1317 (300 mg)-Given       2210 (300 mg)-Given         0630 (300 mg)-Given       1433 (300 mg)-Given       2154 (300 mg)-Given        0610 (300 mg)-Given       1829 (300 mg)-Given [C]        0137 (300 mg)-Given              1104 (300 mg)-Given       1424 (300 mg)-Given       2233 (300 mg)-Given                Dose: 20 mg Freq: 2 TIMES DAILY. Route: IV  Start: 06/13/17 1415   End: 06/14/17 1638       1425 (20 mg)-Given        0857 (20 mg)-Given       1638 (20 mg)-Given               Dose: 50 mL Freq: ONCE Route: PO  Start: 06/14/17 1015   End: 06/14/17 1227   Admin Instructions: Mix 50mL bottle of Omnipaque with 600mL of water. Patient will drink 325mL of the mixture. 1 hour later, patient will drink the remaining 325mL of mixture. Scan will be 2 hours after the first dose. Patient will need to be NPO once they start drinking the prep.         1227 (50 mL)-Given               Dose: 93 mL Freq: ONCE Route: IV  Start: 06/14/17 1630   End: 06/14/17 1659        1659 (93 mL)-Given               Dose: 68 mL Freq: ONCE Route: IV  Start: 06/14/17 1630   End: 06/14/17 1659   Admin Instructions: This entry is for use by Radiology to intermittently used as a flush in patients receiving a CT scan.         1659 (68 mL)-Given            Discontinued Medications  Medications 06/10/17 06/11/17 06/12/17 06/13/17 06/14/17 06/15/17 06/16/17         Dose: 0.5-1 mg Freq: EVERY 2 HOURS PRN Route: IV  PRN Reason: anxiety  Start: 06/15/17 0256   End: 06/15/17 1158   Admin Instructions: For IV PUSH: Dilute with equal volume of NS.          0318 (1 mg)-Given       1158-Med Discontinued          Dose: 1-2 mg Freq: EVERY 30 MIN PRN Route: PO  PRN Reason: other  PRN Comment: per CIWA-Ar score  Start: 06/08/17 1409   End: 06/14/17 0949   Admin Instructions: Oral dosing is the preferred route of administration.  Dose according to CIWA-Ar score:    For CIWA-Ar Score LESS THAN OR EQUAL TO 7:  ~ NO LORazepam (ATIVAN) is to be administered and  ~ repeat CIWA-Ar scale in 4 hours and PRN    For CIWA-Ar  Score 8-12:   ~ give LORazepam (ATIVAN) 1 mg PO or IV and  ~ repeat CIWA-Ar scale in 1 hour     For CIWA-Ar Score 13-15:  ~ give LORazepam (ATIVAN) 2 mg PO or IV   ~ and repeat CIWA-Ar scale in 1 hour    For CIWA-Ar Score GREATER THAN OR EQUAL TO 16:   ~ give LORazepam (ATIVAN) 2 mg PO or IV and   ~ repeat CIWA-Ar scale in 30 minutes    Doses should be withheld for nystagmus, sedation, ataxia, dysarthria or respiratory rate less than 12. If dose is being held more than once, provider must be notified.      1003 (1 mg)-Given          0519 (1 mg)-Given       0949-Med Discontinued        Or    Dose: 1-2 mg Freq: EVERY 30 MIN PRN Route: IV  PRN Reason: other  PRN Comment: per CIWA-Ar score  Start: 06/08/17 1409   End: 06/14/17 0949   Admin Instructions: Oral dosing is the preferred route of administration.    Dose according to CIWA-Ar Score:    For CIWA-Ar Score LESS THAN OR EQUAL TO 7:   ~ NO LORazepam (ATIVAN) is to be administered  and  ~ repeat CIWA-Ar scale in 4 hours and PRN    For CIWA-Ar Score 8-12:  ~ give LORazepam (ATIVAN) 1 mg PO or IV and  ~ repeat CIWA-AR scale in 1 hour     For CIWA-Ar Score 13-15:  ~ give LORazepam (ATIVAN) 2 mg PO or IV and   ~ repeat CIWA-Ar scale in 1 hour    For CIWA-Ar Score GREATER THAN OR EQUAL TO 16:  ~ give LORazepam (ATIVAN) 2 mg PO or IV and  ~ repeat CIWA-Ar scale in 30 minutes    Doses should be withheld for nystagmus, sedation, ataxia, dysarthria or respiratory rate less than 12. If dose is being held more than once, provider must be notified.  For IV PUSH: Dilute with equal volume of NS.                       0949-Med Discontinued           Dose: 45 mg Freq: AT BEDTIME Route: PO  Start: 06/06/17 2200   End: 06/15/17 0741    2210 (45 mg)-Given        2154 (45 mg)-Given        2209 (45 mg)-Given        2233 (45 mg)-Given        2204 (45 mg)-Given        0741-Med Discontinued          Dose: 10 mL Freq: EVERY 8 HOURS Route: IK  Start: 06/14/17 1115   End: 06/15/17 1015   Admin  Instructions: And Q1H PRN, to lock peripheral midline IV catheter dormant lumen. Recommended to flush Q8H each lumen even during infusions         1330 (10 mL)-Given       (2204)-Not Given [C]               1015-Med Discontinued          Dose: 7.5 mg Freq: AT BEDTIME PRN Route: PO  PRN Reason: sleep  Start: 06/12/17 0912   End: 06/15/17 0741   Admin Instructions: May repeat x 1    Dose adjustment for patient age per protocol        2236 (7.5 mg)-Given        2204 (7.5 mg)-Given        0741-Med Discontinued          Dose: 50 mg Freq: 2 TIMES DAILY PRN Route: PO  PRN Reason: moderate pain  Start: 06/06/17 2051   End: 06/15/17 0743     2154 (50 mg)-Given        0943 (50 mg)-Given         1738 (50 mg)-Given        0110 (50 mg)-Given       0743-Med Discontinued          Dose: 100 mg Freq: AT BEDTIME Route: PO  Start: 06/06/17 2200   End: 06/15/17 0741    2210 (100 mg)-Given        2154 (100 mg)-Given        2209 (100 mg)-Given        2233 (100 mg)-Given        2204 (100 mg)-Given        0741-Med Discontinued     Medications 06/10/17 06/11/17 06/12/17 06/13/17 06/14/17 06/15/17 06/16/17

## 2017-06-06 NOTE — TELEPHONE ENCOUNTER
patient states the left leg is no better, but no worse- the redness is still confined to the black lines provider marked    Right leg redness/pain and swelling has gone past the red line and into the knee- knee is very hot and swollen    patient advised to go to the ER- she will go to [facility] for evaluation    Essence Rodas,RN  [facility]  [phone]

## 2017-06-06 NOTE — IP AVS SNAPSHOT
` ` Patient Information     Patient Name Sex Kavitha Gonzalez ([id number]) Female 1945       Room Bed    [address] [place] [address]      Patient Demographics     Address Phone E-mail Address    [address] [address] [phone] (Home) [email or web address]      Patient Ethnicity & Race     Ethnic Group Patient Race    American White      Emergency Contact(s)     Name Relation Home Work Mobile    Cers, Sander Significant other none none [phone]    Flory Jarrell Sister [phone] none none    Nery Brown Daughter none none [phone]      Documents on File        Status Date Received Description       Documents for the Patient    Face Sheet Received () 09     Privacy Notice - [place] Received () 09/15/03     Waiver - Payment  () 04     Insurance Card  () 04     Insurance Card  () 01/10/06     Consent Form  01/10/06     Insurance Card  () 07     Insurance Card  () 08     Insurance Card  () 09     External Medication Information Consent Accepted () 09     Face Sheet Received () 03/02/10     Insurance Card  () 04/28/10     Insurance Card  () 06/17/10     External Medication Information Consent Accepted () 08/06/10     Patient ID Received () 12     Consent for Services - Hospital/Clinic Received () 11/16/10     Privacy Notice - [place] Received () 11/16/10     Consent for Services - Hospital/Clinic Received () 11     External Medication Information Consent Accepted () 11     CMS IM for Patient Signature       [facility] () [facility]    Consent for Services - Hospital/Clinic Received () 12     Advance Directives and Living Will Received 06/07/10     Consent for Services - Hospital/Clinic Received () 12     HIM SREEKANTH Authorization - File Only  12  AUTHORIZATION FOR RELEASE OF PROTECTED HEALTH INFORMATION    HIM SREEKANTH Authorization - File Only  12 AUTHORIZATION FOR RELEASE OF PROTECTED HEALTH INFORMATION    HIM [facility]TH Authorization - File Only  12 AUTHORIZATION FOR RELEASE OF PROTECTED HEALTH INFORMATION    HIM [facility]TH Authorization - File Only  12 AUTHORIZATION FOR RELEASE OF PROTECTED HEALTH INFORMATION    External Medication Information Consent Accepted 12     Consent for EHR Access  13 Copied from existing Consent for services - C/HOD collected on 2012    [facility] Specified Other  () 13 CONSENT FOR SERVICES -42 CFR    Consent for Services - Hospital/Clinic Received () 14     HIM SREEKANTH Authorization - File Only  14 DR. VASQUEZ    Privacy Notice - [place] Received 14 ACKNOWLEDGMENT OF RECEIPT OF NOTICE OF PRIVACY PRACTICE    Consent for Services - Hospital/Clinic  () 14 [facility] CONSENT    Patient ID Received () 16     HIM SREEKANTH Authorization - File Only   [facility], 14    Consent for Services - Hospital/Clinic Received () 02/27/15     Insurance Card Received () 06/09/15     Consent for Services/Privacy Notice - Hospital/Clinic Received () 16     Business/Insurance/Care Coordination/Health Form - Patient  16 BCBS- APPROVAL/TEMAZEPAM- 16 THRU 17    Insurance Card Received 17 BCBS    Consent for Services/Privacy Notice - Hospital/Clinic Received 17     Patient ID Received 17 [place] EX[place] 10/16/2020    Insurance Card Received (Deleted) 14     Insurance Card Received (Deleted) 16     Privacy Notice - [place] Received (Deleted) 16     Consent for Services/Privacy Notice - Hospital/Clinic Received (Deleted) 17        Documents for the Encounter    CMS IM for Patient Signature Received 17 [facility]      Admission Information     Attending Provider Admitting Provider  Admission Type Admission Date/Time    Enedelia Rangel MD Nistor, Doina Simona, MD Emergency 06/06/17  1424    Discharge Date Hospital Service Auth/Cert Status Service Area     Hospitalist [facility] SERVICES    Unit Room/Bed Admission Status        [address] [facility] SPEC UNIT 6610/6610-01 Admission (Confirmed)       Admission     Complaint    Cellulitis, JAUNDICE      Hospital Account     Name Acct ID Class Status Primary Coverage    Kavitha Headley [id number] Inpatient Open MEDICARE - MEDICARE FOR HB SUPPLEMENT            Guarantor Account (for Hospital Account #[id number])     Name Relation to Pt Service Area Active? Acct Type    Kavitha Headley  FCS Yes Personal/Family    Address Phone          [address] [place] [address] [phone](H)  none(O)              Coverage Information (for Hospital Account #[id number])     1. MEDICARE/MEDICARE FOR HB SUPPLEMENT     F/O Payor/Plan Precert #    MEDICARE/MEDICARE FOR HB SUPPLEMENT     Subscriber Subscriber #    Kavitha Headley [id number]    Address Phone    [employer]  [address]  [place] [address] [phone]          2. BCBS/BCBS PLATINUM BLUE     F/O Payor/Plan Precert #    BCBS/BCBS PLATINUM BLUE     Subscriber Subscriber #    Kavitha Headley [id number]    Address Phone    [address] [address]  [place], [place] [id number] [phone]

## 2017-06-06 NOTE — IP AVS SNAPSHOT
` `     [place] [address] [facility]: [phone]                 INTERAGENCY TRANSFER FORM - NOTES (H&P, Discharge Summary, Consults, Procedures, Therapies)   2017                    Hospital Admission Date: 2017  DARWIN JASSO   : 1945  Sex: Female        Patient PCP Information     Provider PCP Type    Alison Pena MD General         History & Physicals      H&P signed by Enedelia Rangel MD at 2017  4:38 PM      Author:  Enedelia Rangel MD Service:  Hospitalist Author Type:  Physician    Filed:  2017  4:38 PM Date of Service:  2017  1:16 AM Creation Time:  2017  4:25 AM    Status:  Signed :  Enedelia Rangel MD (Physician)         DATE OF ADMISSION:  2017.      PRIMARY CARE PHYSICIAN:  Dr. Alison Pena.      CHIEF COMPLAINT:  Bilateral lower extremity swelling and redness.      HISTORY OF PRESENT ILLNESS:  Had been obtained from the patient who is a relatively good historian.  I also discussed with ER attending and I reviewed her chart.      Ms. Darwin Hartman is a pleasant 71-year-old female with past medical history of hypertension, dyslipidemia, obesity status post gastric bypass, alcohol abuse, chronic pain syndrome on a pain agreement, history of left lower extremity cellulitis, history of macrocytic anemia, moderate aortic stenosis, chronic kidney disease stage III, history of supraventricular tachycardia, depression/anxiety, who came in for evaluation of bilateral lower extremity swelling and erythema.  She reports that usually her legs are not swollen, although she does take Lasix at home p.r.n. for leg swelling.  She states that she started having more swelling in her lower extremities 2 weeks ago.  Initially it was more obvious on left lower extremity.  Then, more obvious on the right lower extremity.  She went to see her primary care physician for this.  It seems that on  she was started on clindamycin. Apparently she  started having nausea and diarrhea while she was on clindamycin, so when she saw her primary care physician again on 05/30, the clindamycin was stopped and she was started on minocycline.  On 06/02, she was seen again in her primary care physician's office and she was told to start taking Keflex as well in addition to minocycline.  Despite all these different antibiotics, she continued to have lower extremity swelling, redness and some warmth reportedly.  She states that for the last few days she started taking Lasix 20 mg daily, but despite this, swelling again did not improve.  Upon further questioning, she denies any fever at home.  She denies any chest pain.  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  She also reports some weight gain of 8 pounds in the last 8 weeks or so.  She denies any headache.  She did have some diarrhea while she was on clindamycin, now no more diarrhea, just some loose stools, no bowel movement today.  She denies any dysuria.      In the ER, the patient was seen by Dr. Martínez.  Her initial vitals showed blood pressure of 138/81, heart rate was 96, respiratory rate 18, oxygen saturation 96% on room air, and T-max in the ER was 99.1.  She did have basic blood work which showed pretty significant abnormalities with BMP with low sodium of 129, potassium of 2.5, chloride 18, bicarbonate of 31, BUN 20, creatinine 1.02, anion gap of 18.  Her LFTs with albumin 2.6, total protein 6, total bilirubin 2.3, alkaline phosphatase 199, , AST elevated at 338, glucose 99.  CBC with no leukocytosis, normal hemoglobin, hematocrit mildly decreased at 34.8, normal platelet count.  Blood cultures drawn in the ER and pending at this time.  She had an ultrasound of her lower extremities in the ER which was negative for DVT.  In the ER, the patient received 1 dose of  vancomycin IV and Hospitalist Service was called regarding the admission.      PAST MEDICAL HISTORY:   1.  Hypertension.   2.  Dyslipidemia.   3.  History of mild coronary artery disease with cardiac catheterization in 2015 showing mild distal branch disease.   4.  History of left lower extremity cellulitis.   5.  History of obesity, status post gastric bypass surgery.   6.  Chronic insomnia.   7.  Chronic lower back pain, on pain agreement.   8.  History of alcohol abuse.   9.  History of hyponatremia.   10.  History of depression/anxiety.   11.  History of moderate aortic stenosis with last echocardiogram in 2015 showing aortic valve area of 1.1 square cm.   12.  History of microcytic anemia.   13.  History of supraventricular tachycardia.   14.  History of chronic kidney disease, stage III.      PAST SURGICAL HISTORY:   1.  Status post gastric bypass surgery in .   2.  Bilateral knee replacement.   3.  Right hip arthroplasty.   4.  Appendectomy.   5.  Mediastinoscopy with mediastinal adenopathy, biopsy.   6.  Bilateral carpal tunnel release.   7.  Cholecystectomy.   8.  Cystocele repair via da Kimber laparoscopic intervention.   9.  Cystoscopy with lithotripsy.   10.  Mammoplasty with breast augmentation, bilateral.   11.  Abdominal laparotomy with lysis of adhesions and ventral hernia repair in 2004.   12.  Vaginal hysterectomy and bilateral salpingo-oophorectomy for a myoma and bleeding.      SOCIAL HISTORY:  The patient lives at home.  She states she never smoked.  She does admit to drinking 2-3 cocktails per night and she denies illicit drug abuse.      FAMILY HISTORY:  Reviewed with the patient, it seems that her mother  because of rare blood disease.  Her father had COPD and lung cancer.  She has 2 sisters and 1 brother apparently in good state of health.  She has 4 children, again in good state of health.      MEDICATIONS PRIOR TO ADMISSION:   1.  Gabapentin 300 mg by mouth daily as needed  in the morning.   2.  Gabapentin 600 mg by mouth at bedtime.   3.  Reglan 5 mg by mouth 4 times daily p.r.n.   4.  Recently started minocycline and Keflex.   5.  Lasix 20 mg by mouth daily p.r.n.   6.  Trazodone 100 mg p.o. at bedtime.   7.  Valtrex 2 tablets by mouth q.12 h. p.r.n. for outbreaks of cold sores.   8.  Temazepam 15 mg by mouth at bedtime as needed for sleep.   9.  Remeron 45 mg p.o. at bedtime.   10.  Tramadol 50 mg by mouth twice daily p.r.n. for moderate pain.   11.  Naproxen 500 mg by mouth twice daily p.r.n. for moderate pain.   12.  Metoprolol 25 mg p.o. daily.   13.  Lipitor 40 mg p.o. daily.   14.  Multivitamin 1 tablet p.o. daily.   15.  Aspirin 81 mg p.o. daily.   16.  Potassium chloride 20 mEq by mouth daily.      ALLERGIES:  She is allergic to Bactrim, codeine and morphine.      REVIEW OF SYSTEMS:  A 10-point review of systems was conducted and it was negative except for pertinent positives mentioned in history of present illness.      PHYSICAL EXAMINATION:   VITAL SIGNS:  Blood pressure is 105/54, heart rate 96, respiratory rate 18, oxygen saturation 97% on room air, temperature 99.1 degrees Fahrenheit.   GENERAL:  The patient is awake, alert, no acute distress at the time of my examination.   HEENT:  Head is normocephalic, atraumatic.  Pupils are equally round and reactive to light.  Oral mucosa is moist.   NECK:  Supple.  No cervical lymphadenopathy, no thyromegaly.   CHEST:  There is bilateral air entry, no wheezing, no rales, no crackles.   CARDIOVASCULAR:  There is normal S1, S2, regular rate and rhythm.  There is a systolic murmur 3/6 intensity, precordial area.  No rubs.   ABDOMEN:  Soft, nontender, nondistended.  Bowel sounds are present.   EXTREMITIES:  There is 1+ pitting indurated edema bilateral lower extremities with some redness extended up to her knees, no blisters, no open wounds, no obvious warmness noted, 2+ peripheral pulses are palpable.   SKIN:  Intact, no cyanosis.   There is some redness on bilateral lower extremities below her knees as mentioned above.   NEUROLOGIC:  The patient is awake, alert, oriented to self, place and time.  There are no focal neurological deficits.   PSYCHIATRIC:  Normal mood and normal affect.   MUSCULOSKELETAL:  She moves all extremities freely, there are no obvious joint deformities.      DIAGNOSTIC DATA:  Sodium is 129, potassium 2.5 initially, chloride 80, bicarbonate 39, BUN 20, creatinine 1.01, calcium is 8.8, anion gap of 18, albumin 2.6, total protein 6, total bilirubin 2.3, alkaline phosphatase 199, , .  White blood cells 6.3, hemoglobin 12.9, hematocrit 34.8, platelet count 217.  Blood culture is pending at this time.  Ultrasound of lower extremities bilaterally negative for DVT.      ASSESSMENT AND PLAN:  Ms. Kavitha Hartman is a pleasant 71-year-old female with past medical history of hypertension, dyslipidemia, depression/anxiety, alcohol abuse, history of mild coronary artery disease, moderate aortic stenosis, microcytic anemia, chronic back pain with signed pain agreement, history of left lower extremity cellulitis, history of obesity status post bariatric surgery, who came in for evaluation of bilateral lower extremity swelling and erythema with initial concern for cellulitis.   1.  Bilateral lower extremity swelling, concerning for cellulitis, although this might represent venous stasis given the fact that it is bilateral location:  There is some discoloration of the legs, but no obvious warmness noted.  She does not have any fever and no elevated white blood cells.  She was treated with different courses of antibiotics as an outpatient including clindamycin, minocycline and Keflex without significant improvement.  Ultrasound of lower extremities bilaterally was negative for deep venous thrombosis.  The plan for now is to continue with intravenous vancomycin that was started in the emergency room.  We will follow up blood  cultures.  We will follow up fever curve and white blood cell trend, and an Infectious Disease consult was requested for the morning.  I will give her 1 dose of Lasix intravenous 40 mg once tonight and to see if it will help in improving her lower extremity swelling.  I will also order an echocardiogram of the heart to rule out any evidence of congestive heart failure, since she does reports some symptoms concerning for congestive heart failure exacerbation such as recent exertional shortness of breath, some weight gain and bilateral lower extremity swelling.   2.  Hyponatremia with sodium of 129:  I do not see that she had low sodium levels in the past.  This might represent hypovolemic versus euvolemic hyponatremia.  We will plan to check her UA, urine sodium, serum osmolality as well as urine osmolality to differentiate between different causes of hyponatremia.  Since she does seem dry, I do not think she needs intravenous fluids at this time.  As I mentioned above, I will give 1 dose of Lasix 40 mg intravenous and we will plan to repeat sodium in the morning.   3.  Hypokalemia with potassium of 2.5 on admission:  This might be related with recent daily use of Lasix.  She did receive some potassium supplementation in the emergency room, repeated potassium is 3.1.  Since I am planning to give an extra dose of Lasix intravenous tonight, I am anticipating that her potassium will be on lower side as well, so potassium replacement protocol has been ordered.  If patient will be decided to be sent home on daily potassium, she should be on potassium supplementation.   4.  Abnormal liver function tests with elevated , :  Her total bilirubin is also elevated at 2.3.  I think this is related to her daily alcohol drinking.  Will attempt to hold her prior to admission statin, repeat LFTs in the morning and check an ultrasound of right upper quadrant.  If her LFTs are not improving, consider hepatitis serology  studies.   5.  History of hypertension:  Blood pressure is well controlled for now, will continue her prior to admission metoprolol with holding parameters.   6.  Alcohol abuse:  She reports drinking 2-3 cocktails per night.  She denies having history of alcohol withdrawal in the past.  Her current blood work somehow suggests chronic alcoholic use with hyponatremia, hypokalemia, and elevated liver function tests.  We will check her INR as well.  I did advise her to quit drinking.  I did not put her on CIWA protocol for now, will just monitor, but of course if she presents with any evidence of alcohol withdrawal such as shakiness tachycardia, agitation, diaphoresis, we will start CIWA protocol.  I did start her though on folic acid, thiamine and multivitamin.   7.  History of depression/anxiety:  Will continue with her prior to admission Remeron and trazodone.   8.  History of insomnia:  I will continue with her prior to admission temazepam, but I will decrease the dose from 50 mg to 7.5 mg at bedtime p.r.n.   9.  History of chronic pain, on narcotic agreement:  As per her primary care physician, will continue her prior to admission Ultram 50 mg by mouth twice daily p.r.n. as well as her prior to admission gabapentin.   10.  History of moderate aortic stenosis:  Her last echocardiogram was in 08/2015.  She stated that she is due for a new echocardiogram.  I am also wondering if some of her symptoms are related to some degree of congestive heart failure exacerbation, will plan to have an echocardiogram at this time.   11.  History of dyslipidemia:  As mentioned above, plan to hold her prior to admission Lipitor for now given abnormal liver function tests.   12.  Deep venous thrombosis prophylaxis:  I have started her on Lovenox 40 mg subcutaneously daily.   13.  Code status:  Was discussed with the patient and patient is full code.   14.  Disposition:  I anticipate at least 2 days of hospitalization pending clinical  improvement.         ENEDELIA MEDRANO MD             D: 2017 01:16   T: 2017 04:24   MT: [email or web address]      Name:     KAVITHA JASSO   MRN:      [id number]        Account:      [id number]   :      1945           Admitted:     [id number]      Document: [id number]       cc: Alison Pena MD[DN1.1]        Revision History        User Key Date/Time User Provider Type Action    > DN1.1 2017  4:38 PM Enedelia Medrano MD Physician Sign     [N/A] 2017  4:25 AM Enedelia Medrano MD Physician Edit                     Discharge Summaries      Discharge Summaries by Nan Macias DO at 2017  9:27 AM     Author:  Nan Macias DO Service:  Hospitalist Author Type:  Physician    Filed:  2017 10:36 AM Date of Service:  2017  9:27 AM Creation Time:  2017  9:27 AM    Status:  Signed :  Nan Macias DO (Physician)         [facility]    Discharge Summary  Hospitalist    Date of Admission:  2017  Date of Discharge:  2017  Discharging Provider: Nan Macias    Discharge Diagnoses[KW1.1]   Abnl LFTs likely dt alcoholic liver disease  Bilateral LE Cellulitis vs Worsening Chronic Venous Stasis  Chronic Bilateral LE Edema  Acute Kidney Injury, resolved  Thrombocytopenia, improved  EtOH Abuse  Depression / Anxiety  Moderate Aortic Stenosis  Hypertension  Dyslipidemia  Insomnia  Chronic Back Pain[KW1.2]    History of Present Illness[KW1.1]   Kavitha Jasso is a 71 year old female with PMHx of hypertension, dyslipidemia, moderate aortic stenosis, depression, anxiety, EtOH use, chronic back pain, recent LE cellulitis and oral antibiotics who was admitted on 2017 with worsening LE erythema and swelling. Additionally, she was found to have abnl LFTs.[KW1.2]    Hospital Course   Kavitha Jasso was admitted on 2017.  The following problems were addressed during her hospitalization:[KW1.1]    Abnl LFTs, likely dt  alcoholic liver disease:  LFTs elevated on admission. Suspected secondary to acute alcoholic hepatitis, possibly underlying liver disease or early cirrhosis though underwent additional workup given cholestatic picture (raising concerns for med related vs autoimmune vs obstructive)  RUQ US this stay showed no acute findings other than some fatty infiltration. No nodularities.   Viral hepatitis panel neg, antimitochondiral Ab neg, CMV neg, EBV IgG positive, HSV pending at time of discharge, iron and ferritin nl  Statin held on admission, was initially placed on Ancef as below, though that was subsequently dc'd  GI consulted -- seen by Dr. Graham  Underwent EGD and EUS on 6/12 which showed a friable distal esophagus which was biopsied and neg for neoplastic changes; otherwise had nl appearing intrahepatic biliary ducts, CBD not well visualized, no obvious pancreatic mass  Ammonia was noted to be at the high range of nl -- started on lactulose on 6/13  CT abd/pelvis obtained on 6/14 and showed diffuse fatty infiltration throughout the liver, no biliary duct dilation, no visible pancreatic abnl and some nonspecific inguinal lymphadenopathy    -- LFTs improving at the time of discharge and INR stable (1.03), will need close monitoring given potential for medications to exacerbate abnl LFTs -- should have repeat CMP next week  -- conts on sucralfate and H2 blocker given findings on EGD  -- cont Lactulose  -- needs to abstain from EtOH  -- should follow up with Dr. Graham after discharge      Bilateral LE Cellulitis vs Worsening Chronic Venous Stasis:  Recently treated for cellulitis prior to admission with clindamycin, then switched to minocycline and Keflex dt reported intolerance. On admission, patient endorsed worsening LE erythema and swelling. Was afebrile, CBC nl. Bilateral US neg for DVT.   IV Vancomycin was started on admission -- seen by ID and was changed to Ancef on 6/7, this was ultimately stopped on 6/9, was  then transitioned to clindamycin (ID recommended Keflex but this was not used given concerns for exacerbating LFTs)     -- completed course of antibiotic therapy on 6/13  -- mgmt of LE edema as below     Chronic Bilateral LE Edema:  PTA: Lasix 20mg po daily.   Edema likely dt underlying liver disease and low albumin. No evidence of pulmonary edema.   Echo this stay showed intact EF (>70%), though with impaired LV relaxation, moderate AS and a mildly dilated ascending aorta  Was given dose of IV Lasix on admission -- subsequent doses held given development of LYLY and hyponatremia. Was transiently given IVFs -- these were stopped on 6/11 given worsening of LE edema  Resumed on Lasix with albumin on 6/13 and was given 3 doses (given over the course of 6/13 - 6/14), renal function tolerated.     -- additional Lasix was not prescribed at discharge but may need to be considered in the future  -- recommended to continue to keep legs elevated, follow low Na diet, cont nutritional supplements      Acute Kidney Injury: Resolved  Cr reportedly normal prior to admission. Peaked at 1.4 this stay after initial diuresis  Seen by nephrology this stay -- felt she was intravascularly volume depleted and was started on NS.   Cr improved on NS but edema worsening, IVFs dc'd 6/11.  Given Lasix and albumin from 6/13 - 6/14, renal function remained stable     -- renal function remained stable at time of discharge  -- if considering resumption of Lasix in the future will need close monitoring of renal function     Thrombocytopenia:  Etiology not completely clear -- thought to be possibly secondary to underlying liver disease, antibiotic use, EtOH and low grade DIC  Platelet count dropped as low as 51 on 6/12, has since started to improve  Peripheral smear showed macrocytosis and moderate thrombocytopenia, no blasts  Lovenox dc'd, HIT Ab panel neg  Had associated elevated d-dimer, low fibrinogen and mildly elevated INR -- clinical picture  concerning for possible mild DIC, discussed w/Dr. Pa, recommended BM biopsy if platelet count conts to drop  GI suspects that liver disease is the underlying culprit    -- platelet count was improving at the time of discharge[KW1.2] (100-110)[KW1.3], should have repeat CBC next week  -- recommended to follow up with Dr. Pa      EtOH Abuse:  Reported drinking 2-3 cocktails/night. Denied prior hx of withdrawal. Workup this stay suggestive of chronic EtOH use.   Was notably tremulous and unsteady on 6/8 and started on CIWA protocol -- no overt signs of withdrawal so this was dc'd.      -- psych following this stay -- recommended hospital-based inpatient stay for ongoing care, best option may be [facility] -- patient will pursue this further after completion of rehab stay and improvement in strength  -- cont thiamine/folate/MVI     Depression / Anxiety:  Psych following this stay.   Started on Trintellex and Seroquel, Seroquel then stopped stopped dt concern it may be contributing to elevated LFTs     -- medications have been adjusted this stay -- per psych assessment on 6/15, cont on Trintellix and Seroquel 25mg HS plus 15mg TID prn; stopped trazodone and Restoril, Remeron dose decreased to 15mg HS  -- patient discharged on this regimen  -- minimize use of prn Seroquel as able so as not to exacerbate abnl LFTs     Moderate Aortic Stenosis:  Noted on echo this stay.   Should follow up with cardiology after discharge.     Hypertension:  Chronic and stable on Metoprolol     Dyslipidemia:  Statin stopped this stay this stay given abnl LFTs     Insomnia:  Restoril and Trazodone were stopped this stay.      Chronic Back Pain:  On narcotic agreement.  Remains on gabapentin (300mg daily, 600mg HS) and prn Tramadol (though dose decreased to 25mg TID prn)[KW1.2]    Nan Macias    Significant Results and Procedures[KW1.1]   6/12/17 EUS per Dr. Graham  Findings:        Endoscopic Finding :        LA Grade C (one  or more mucosal breaks continuous between tops of 2 or        more mucosal folds, less than 75% circumference) esophagitis with no        bleeding was found 30 cm from the incisors. Biopsies were taken with a        cold forceps for histology.        Evidence of a Otis-en-Y gastrojejunostomy was found. The gastrojejunal        anastomosis was characterized by congestion, edema, erosion, erythema,        inflammation and ulceration. This was traversed. The pouch-to-jejunum        limb was characterized by congestion. The duodenum-to-jejunum limb was        examined.        The examined jejunum was normal.        Endosonographic Finding :        Moderte portal gastropathy.        There was no sign of significant endosonographic abnormality in the        esophagus.        Endosonographic images of the stomach were unremarkable.        Endosonographic imaging of the pancreas showed sonographic changes        indicative of mild chronic pancreatitis in the pancreatic body and in        the pancreatic tail. The parenchyma had hyperechoic foci, hypoechoic        foci and lobularity. The pancreatic duct had hyperechoic walls. The        pancreatic duct measured up to 2 mm in diameter.        No lymphadenopathy seen.        There was no sign of significant endosonographic abnormality in the        bifurcation of the common hepatic duct and in the intrahepatic bile        duct(s).        There was no sign of significant endosonographic abnormality in the left        lobe of the liver. Small amount oa parihepatic Ascites seen.                                                                                     Impression:                 - LA Grade C esophagitis. Biopsied.   - Otis-en-Y gastrojejunostomy with gastrojejunal anastomosis characterized by congestion, edema, erosion, erythema, inflammation and ulceration.    - Normal examined jejunum.   - There was no sign of significant pathology in the esophagus.   -  Endosonographic images of the stomach were unremarkable.   - Endosonographic imaging of the pancreas showed sonographic changes suggestive of mild chronic pancreatitis.   - There was no sign of significant pathology in the bifurcation of the common hepatic duct and in the intrahepatic bile duct(s).   - There was no evidence of significant pathology in the left lobe of the liver.     Recommendation:             - Use sucralfate tablets 1 gram PO QID.   - I will contact patient with Biopsy result in 1-2 weeks. Please contact our Office at  at [facility] if ther is any questions.    -----------------------------------  6/6/17 Echocardiogram:  Interpretation Summary     Hyperdynamic left ventricular functionThe visual ejection fraction is  estimated at >70%.The transmitral spectral Doppler flow pattern is suggestive  of impaired LV relaxation.  The right ventricular systolic function is normal.  The left atrium is mild to moderately dilated.  Moderate valvular aortic stenosis.Mean gradient 29 mm hg, RENE 1.1 cm2.  The ascending aorta is mildly dilated.     Compared to echo dated 08/04/2015 no singificant change, aortic valve  gradients and AV area appear similar.[KW1.2]    Pending Results   These results will be followed up by[KW1.1] PCP / GI[KW1.2]  Unresulted Labs Ordered in the Past 30 Days of this Admission     Date and Time Order Name Status Description    6/13/2017 0000 HSV IgM antibody In process[KW1.3]           Code Status[KW1.1]   Full Code[KW1.2]       Primary Care Physician   Alison Pena    Physical Exam   Temp: 98.4  F (36.9  C) Temp src: Oral BP: 131/80 Pulse: 86 Heart Rate: 85 Resp: 18 SpO2: 96 % O2 Device: None (Room air)    Vitals:    06/14/17 0649 06/15/17 0702 06/16/17 0500   Weight: 90.5 kg (199 lb 8.3 oz) 88.9 kg (195 lb 15.8 oz) 90 kg (198 lb 6.6 oz)     Vital Signs with Ranges  Temp:  [98.2  F (36.8  C)-98.4  F (36.9  C)] 98.4  F (36.9  C)  Pulse:  [86] 86  Heart Rate:   [] 85  Resp:  [16-18] 18  BP: (125-137)/(80-89) 131/80  SpO2:  [94 %-98 %] 96 %  I/O last 3 completed shifts:  In: 300 [P.O.:300]  Out: 1500 [Urine:1500]    General: Resting comfortably, alert, conversive, NAD  CVS:[KW1.1] HRRR with +SM along sternal border[KW1.2]  Respiratory:[KW1.1] CTAB, no wheeze/rales/rhonchi, breathing non-labored[KW1.2]  GI:[KW1.1] S, NT, ND, +BS[KW1.2]  Ext:[KW1.1] +bilateral LE edema to the knee[KW1.2]  Skin: Warm/dry[KW1.1], jaundice improved, scattered ecchymosis on upper extremities[KW1.2]    Discharge Disposition[KW1.1]   Discharged to short-term care facility[KW1.2]  Condition at discharge:[KW1.1] Stable[KW1.2]    Consultations This Hospital Stay   INFECTIOUS DISEASES IP CONSULT  PSYCHIATRY IP CONSULT  GASTROENTEROLOGY IP CONSULT  NEPHROLOGY IP CONSULT  HEMATOLOGY & ONCOLOGY IP CONSULT    PHYSICAL THERAPY ADULT IP CONSULT  OCCUPATIONAL THERAPY ADULT IP CONSULT    Time Spent on this Encounter[KW1.1]   INan, personally saw the patient today and spent greater than 30 minutes discharging this patient.[KW1.2]    Discharge Orders     General info for SNF   Length of Stay Estimate: Short Term Care: Estimated # of Days <30  Condition at Discharge: Improving  Level of care:skilled   Rehabilitation Potential: Good  Admission H&P remains valid and up-to-date: Yes  Recent Chemotherapy: N/A  Use Nursing Home Standing Orders: N/A     Mantoux instructions   Give two-step Mantoux (PPD) Per Facility Policy Yes     Reason for your hospital stay   Evaluation of your lower extremity swelling and abnormal liver tests. It was determined that you likely have liver damage which is related to your alcohol use. Your medications were adjusted.     Follow Up and recommended labs and tests   1. Follow up with PCP or facility physician in 5-7 days with repeat CMP and CBC.  2. Follow up with Dr. Graham (gastroenterology) in clinic in 2-4 weeks.  3. Follow up with Dr. Pa  (hematology/oncology) in 2-4 weeks.     Activity - Up with nursing assistance     Daily weights   Call Provider for weight gain of more than 2 pounds per day or 5 pounds per week.     Full Code     Physical Therapy Adult Consult   Evaluate and treat as clinically indicated.    Reason:  deconditioning     Occupational Therapy Adult Consult   Evaluate and treat as clinically indicated.    Reason:  deconditioning     Advance Diet as Tolerated   Follow this diet upon discharge: 2g salt       Discharge Medications   Current Discharge Medication List      START taking these medications    Details   vortioxetine (TRINTELLIX/BRINTELLIX) 5 MG tablet Take 1 tablet (5 mg) by mouth daily    Associated Diagnoses: Depression, unspecified depression type      folic acid (FOLVITE) 1 MG tablet Take 1 tablet (1 mg) by mouth daily  Qty: 30 tablet    Associated Diagnoses: Alcohol abuse      thiamine 100 MG tablet Take 1 tablet (100 mg) by mouth daily    Associated Diagnoses: Alcohol abuse      sucralfate (CARAFATE) 1 GM/10ML suspension Take 10 mLs (1 g) by mouth 4 times daily (before meals and nightly)  Qty: 1200 mL    Associated Diagnoses: Esophagitis      ranitidine (ZANTAC) 150 MG tablet Take 1 tablet (150 mg) by mouth 2 times daily  Qty: 60 tablet    Associated Diagnoses: Gastroesophageal reflux disease, esophagitis presence not specified; Esophagitis      magnesium oxide (MAG-OX) 400 MG tablet Take 1 tablet (400 mg) by mouth daily  Qty: 7 tablet    Associated Diagnoses: Low magnesium levels      !! QUEtiapine (SEROQUEL) 25 MG tablet Take 1 tablet (25 mg) by mouth At Bedtime  Qty: 60 tablet    Associated Diagnoses: Depression, unspecified depression type      lactulose (CHRONULAC) 10 GM/15ML solution Take 15 mLs (10 g) by mouth 2 times daily  Refills: 0    Associated Diagnoses: Elevated LFTs; Constipation, unspecified constipation type; Alcoholic fatty liver      polyethylene glycol (MIRALAX/GLYCOLAX) Packet Take 17 g by mouth  daily as needed (constipation)  Qty: 7 packet    Associated Diagnoses: Constipation, unspecified constipation type      senna-docusate (SENOKOT-S;PERICOLACE) 8.6-50 MG per tablet Take 1-2 tablets by mouth 2 times daily as needed for constipation  Qty: 100 tablet    Associated Diagnoses: Constipation, unspecified constipation type      !! QUEtiapine (SEROQUEL) 25 MG tablet Take 0.5 tablets (12.5 mg) by mouth 3 times daily as needed (anxiety)  Qty: 60 tablet    Associated Diagnoses: Anxiety       !! - Potential duplicate medications found. Please discuss with provider.      CONTINUE these medications which have CHANGED    Details   mirtazapine (REMERON) 15 MG tablet Take 1 tablet (15 mg) by mouth At Bedtime  Qty: 30 tablet    Associated Diagnoses: Depression, unspecified depression type      metoprolol (TOPROL XL) 25 MG 24 hr tablet Take 0.5 tablets (12.5 mg) by mouth daily  Qty: 90 tablet, Refills: 3    Associated Diagnoses: PVC's (premature ventricular contractions)      traMADol (ULTRAM) 50 MG tablet Take 0.5 tablets (25 mg) by mouth 2 times daily as needed for moderate pain TAKE 1 TABLET BY MOUTH TWICE DAILY AS NEEDED FOR MODERATE PAIN  Qty: 60 tablet, Refills: 3    Associated Diagnoses: Chronic pain syndrome; Controlled substance agreement signed      !! gabapentin (NEURONTIN) 300 MG capsule Take 1 capsule (300 mg) by mouth daily  Qty: 90 capsule    Associated Diagnoses: Chronic pain syndrome       !! - Potential duplicate medications found. Please discuss with provider.      CONTINUE these medications which have NOT CHANGED    Details   !! GABAPENTIN PO Take 600 mg by mouth At Bedtime      metoclopramide (REGLAN) 5 MG tablet Take 1 tablet (5 mg) by mouth 4 times daily as needed  Qty: 40 tablet, Refills: 0    Associated Diagnoses: Nausea      valACYclovir (VALTREX) 1000 mg tablet TAKE 2 TABS EVERY 12 HRS FOR 1 DAY ONLY FOR OUTBREAKS OF COLD SORES as needed  Qty: 4 tablet, Refills: 3    Associated Diagnoses:  Herpes simplex virus infection      Multiple Vitamins-Minerals (CENTRUM SILVER) per tablet Take 1 tablet by mouth daily       !! - Potential duplicate medications found. Please discuss with provider.      STOP taking these medications       Potassium Chloride ER 20 MEQ TBCR Comments:   Reason for Stopping:         cephALEXin (KEFLEX) 500 MG capsule Comments:   Reason for Stopping:         minocycline (MINOCIN/DYNACIN) 100 MG capsule Comments:   Reason for Stopping:         furosemide (LASIX) 20 MG tablet Comments:   Reason for Stopping:         traZODone (DESYREL) 100 MG tablet Comments:   Reason for Stopping:         temazepam (RESTORIL) 15 MG capsule Comments:   Reason for Stopping:         naproxen (NAPROSYN) 500 MG tablet Comments:   Reason for Stopping:         atorvastatin (LIPITOR) 40 MG tablet Comments:   Reason for Stopping:         aspirin 81 MG tablet Comments:   Reason for Stopping:             Allergies   Allergies   Allergen Reactions     Bactrim [Sulfamethoxazole W/Trimethoprim] Hives     Codeine Itching     NAUSEA     Morphine Itching     NAUSEA     Data   Most Recent 3 CBC's:[KW1.1]  Recent Labs   Lab Test  06/16/17   0845  06/15/17   1714  06/14/17   0812  06/13/17   0800   WBC  6.4  6.7   --   7.2   HGB  9.2*  10.0*   --   11.7   MCV  100  100   --   103*   PLT  106*  111*  61*  64*[KW1.4]      Most Recent 3 BMP's:[KW1.1]  Recent Labs   Lab Test  06/16/17   0845  06/15/17   0822  06/14/17   0812  06/13/17   1445  06/13/17   0800   NA  137   --   134  131*  132*   POTASSIUM  3.5   --   4.2   --   4.7   CHLORIDE  98   --   97   --   96   CO2  32   --   33*   --   32   BUN  5*   --   10   --   13   CR  0.74  0.87  0.96  1.00  1.05*   ANIONGAP  7   --   4   --   4   BIRGIT  8.1*   --   8.2*   --   8.4*   GLC  89   --   74   --   88[KW1.5]     Most Recent 2 LFT's:[KW1.1]  Recent Labs   Lab Test  06/16/17   0845  06/15/17   1714   AST  89*  96*   ALT  39  39   ALKPHOS  154*  196*   BILITOTAL  5.8*   7.1*[KW1.5]     Most Recent INR's and Anticoagulation Dosing History:  Anticoagulation Dose History     Recent Dosing and Labs Latest Ref Rng & Units 11/7/2010 4/11/2012 9/29/2015 6/7/2017 6/10/2017 6/11/2017 6/16/2017    INR 0.86 - 1.14 0.89 0.93 0.86 1.02 1.19(H) 1.15(H) 1.03        Most Recent Cholesterol Panel:  Recent Labs   Lab Test  05/11/17   1255   CHOL  232*   LDL  98   HDL  114   TRIG  98     Most Recent 6 Bacteria Isolates From Any Culture (See EPIC Reports for Culture Details):[KW1.1]  Recent Labs   Lab Test  06/06/17   1530  06/06/17   1457  04/30/14   1815  02/07/14   1445  04/11/12   1225  04/11/12   0603   CULT  No growth  No growth  >100,000 colonies/mL Escherichia coli*  >100,000 colonies/mL Mixed gram negative and positive johana Multiple species present, probable perineal contamination. Susceptibility testing not routinely done  Heavy growth Candida albicans Plus Light growth Normal respiratory johana  No growth after 6 days[KW1.5]     Most Recent TSH, T4 and A1c Labs:  Recent Labs   Lab Test  05/11/16   1447   09/29/10   1110   TSH  1.33   < >   --    A1C   --    --   5.7    < > = values in this interval not displayed.     Results for orders placed or performed during the hospital encounter of 06/06/17   US Lower Extremity Venous Duplex Bilateral    Narrative    ULTRASOUND LOWER EXTREMITY VENOUS DUPLEX BILATERAL   6/6/2017 3:49 PM     HISTORY: Swelling and redness.    COMPARISON: Ultrasound legs 2/1/2016.    FINDINGS: Gray-scale, color and Doppler spectral analysis ultrasound  was performed of the bilateral legs. Compression and augmentation  imaging was performed.    There is no evidence for deep venous thrombosis. Subcutaneous edema  identified bilaterally.      Impression    IMPRESSION: No evidence for DVT within either leg.    ORESTES BARRERA MD   US Abdomen Limited    Narrative    ULTRASOUND ABDOMEN LIMITED  6/7/2017 9:00 AM     HISTORY: Rule out liver pathology.    COMPARISON:  4/10/2012.    FINDINGS: The gallbladder is absent. There is abnormal increased  echogenicity of the liver with impaired acoustic transmission. Liver  size is normal. No focal liver lesion or intrahepatic biliary ductal  dilatation. Common bile duct measures 5 mm. Pancreas is completely  obscured by intestinal gas. The right kidney measures 9.7 cm in length  and appears normal.      Impression    IMPRESSION: Fatty infiltrated liver.    KELLEY MOORE MD   CT Abdomen Pelvis w Contrast    Narrative    CT ABDOMEN AND PELVIS WITH CONTRAST 6/14/2017 4:16 PM    HISTORY: 71-year-old patient with obstructive jaundice and  thrombocytopenia. Request made for evaluation of hepatobiliary tree,  pancreas, and splenomegaly.    COMPARISON: April 10, 2012.    TECHNIQUE: Axial and coronal CT images obtained from the lung bases  through the abdomen and pelvis after the uneventful administration of  Isovue-370 intravenous contrast given for a total of 50 mL. Radiation  dose for this scan was reduced using automated exposure control,  adjustment of the mA and/or kV according to patient size, or iterative  reconstruction technique.    FINDINGS: Bilateral breast implants are again identified, partially  visible. Lung bases are unremarkable. Heart size is normal without  pericardial effusion. Right total hip arthroplasty. Intervertebral  disc space narrowing throughout the lumbar spine. No acute osseous  abnormality.    Diffuse fatty infiltration throughout the liver, not readily  identified on previous exam. Cholecystectomy clips. No evidence of  intrahepatic or extrahepatic biliary dilatation. No pancreatic ductal  dilatation. Postsurgical changes with gastrojejunostomy. Spleen is  normal in size measuring up to 9.4 cm. The adrenal glands and kidneys  appear normal.    Cortical thinning of the left kidney, compared to the right, though no  hydronephrosis. Bladder is partially distended and unremarkable.  Status post hysterectomy. No  dilated loops of bowel. Scattered  bilateral inguinal lymph nodes incidentally identified, the largest of  which measures up to 2.6 x 1.4 cm, though appears to have a fatty  hilum. Patient did have variable degree of lymph nodes in similar  location on previous exam, suspect minimally larger on today's exam.      Impression    IMPRESSION:  1. Diffuse fatty infiltration throughout the liver. No evidence of  intrahepatic or extrahepatic biliary dilatation. No visible pancreatic  abnormality. Spleen is normal in size.  2. Cortical thinning of the left kidney, relative to the right, though  unchanged compared to previous exam.  3. Enlarged inguinal lymph nodes, though some of the larger lymph  nodes have fatty leilani. Lymph nodes may be minimally increased in size  compared to previous exam. They may be reactive, though nonspecific.    ZEYNEP CASTRO MD[KW1.1]        Revision History        User Key Date/Time User Provider Type Action    > KW1.4 6/16/2017 10:36 AM Nan Macias,  Physician Sign     KW1.3 6/16/2017 10:35 AM Nan Macias,  Physician      KW1.5 6/16/2017  9:46 AM Nan Macias,  Physician      KW1.2 6/16/2017  9:45 AM Nan Macias,  Physician      KW1.1 6/16/2017  9:27 AM Nan Macias,  Physician                      Consult Notes      Consults by Colton Vargas MD at 6/16/2017  9:19 AM     Author:  Colton Vargas MD Service:  Psychiatry Author Type:  Physician    Filed:  6/16/2017  9:24 AM Date of Service:  6/16/2017  9:19 AM Creation Time:  6/16/2017  9:23 AM    Status:  Signed :  Colton Vargas MD (Physician)     Consult Orders:    1. Psychiatry IP Consult: f/u, assess meds, anxiety; Consultant may enter orders: Yes; Patient to be seen: Routine; Call back #: [phone] [[id number]] ordered by Moe Casillas MD at 06/15/17 0814                Follow-Up Psychiatric Consult: Time Spent: 15 Minutes  Colton SCHOFIELD  MD Alicia.[TW1.1]       Revision History        User Key Date/Time User Provider Type Action    > TW1.1 6/16/2017  9:24 AM Colton Vargas MD Physician Sign            Consults by Moe Casillas MD at 6/15/2017  8:13 AM     Author:  Moe Casillas MD Service:  Psychiatry Author Type:  Physician    Filed:  6/15/2017  8:13 AM Date of Service:  6/15/2017  8:13 AM Creation Time:  6/15/2017  7:57 AM    Status:  Signed :  Moe Casillas MD (Physician)         [facility] Follow-up Psychiatric Consult Progress Note      Interval History:   Pt seen, chart reviewed, care reviewed with treatment team. Debra is very jaundiced, tremulous, not sleeping, and feels anxious. She is on multiple meds that could worsen this tremor, and due to her hepatic impairment dosage adjustment is required. She just started trintellex, but remeron, tramadol, trazadone and restoril are also on board. We discussed use of seroquek in low doses, stopping trazadone, and restoril which is not safe with her age and CD history. We will also lower tramadol and remeron. She seems willing to consider IP CD tx-[place] was discussed as she has medicare and complex medical issues.    Review of systems:    10 point Review of Systems completed is negative other than noted in the HPI,  BP/P/temp, weight reviewed.     Medications:       mirtazapine  15 mg Oral At Bedtime     QUEtiapine  25 mg Oral At Bedtime     sodium chloride (PF)  10 mL Intracatheter Q8H     lactulose  10 g Oral BID     sucralfate  1 g Oral 4x Daily AC & HS     ranitidine  150 mg Oral BID     magnesium oxide  400 mg Oral Daily     vitamin  B-1  100 mg Oral Daily     vortioxetine  5 mg Oral Daily     metoprolol  12.5 mg Oral Daily     gabapentin (NEURONTIN) tablet 600 mg  600 mg Oral At Bedtime     gabapentin (NEURONTIN) capsule 300 mg  300 mg Oral Daily     multivitamin, therapeutic  1 tablet Oral Daily     sodium chloride (PF)  3 mL  Intracatheter Q8H     folic acid  1 mg Oral Daily     metoclopramide, LORazepam, QUEtiapine, traMADol, lidocaine, sodium chloride (PF), miconazole, potassium chloride, potassium chloride, potassium chloride, potassium chloride with lidocaine, potassium chloride, magnesium sulfate, naloxone, lidocaine, lidocaine 4%, sodium chloride (PF), ondansetron **OR** ondansetron      Mental Status Examination:     Appearance:  awake, alert, adequately groomed, dressed in hospital scrubs and appeared older than stated age, very jaundiced  Eye Contact:  good  Speech:  clear, coherent  Use of Language:Appropriate  Psychomotor Behavior:  tremor observed   Mood:  anxious  Affect:  intensity is heightened  Thought Process:  logical, linear and goal oriented no loose associations  Thought Content:  no evidence of suicidal ideation or homicidal ideation and no evidence of psychotic thought  Oriented to:  time, person, and place  Attention Span and Concentration:  fair  Recent and Remote Memory:  intact  Fund of Knowledge: appropriate  Muscle Strength and Tone: normal  Coordination, Station and Gait: Normal  Insight:  fair  Judgment:  fair        Labs/vitals:     Recent Results (from the past 24 hour(s))   Comprehensive metabolic panel    Collection Time: 06/14/17  8:12 AM   Result Value Ref Range    Sodium 134 133 - 144 mmol/L    Potassium 4.2 3.4 - 5.3 mmol/L    Chloride 97 94 - 109 mmol/L    Carbon Dioxide 33 (H) 20 - 32 mmol/L    Anion Gap 4 3 - 14 mmol/L    Glucose 74 70 - 99 mg/dL    Urea Nitrogen 10 7 - 30 mg/dL    Creatinine 0.96 0.52 - 1.04 mg/dL    GFR Estimate 57 (L) >60 mL/min/1.7m2    GFR Estimate If Black 69 >60 mL/min/1.7m2    Calcium 8.2 (L) 8.5 - 10.1 mg/dL    Bilirubin Total 8.1 (H) 0.2 - 1.3 mg/dL    Albumin 2.5 (L) 3.4 - 5.0 g/dL    Protein Total 5.3 (L) 6.8 - 8.8 g/dL    Alkaline Phosphatase 213 (H) 40 - 150 U/L    ALT 25 0 - 50 U/L    AST 65 (H) 0 - 45 U/L   Platelet count    Collection Time: 06/14/17  8:12 AM    Result Value Ref Range    Platelet Count 61 (L) 150 - 450 10e9/L   Ferritin    Collection Time: 06/14/17  8:12 AM   Result Value Ref Range    Ferritin 167 8 - 252 ng/mL   Iron and iron binding capacity    Collection Time: 06/14/17  8:12 AM   Result Value Ref Range    Iron 123 35 - 180 ug/dL    Iron Binding Cap 126 (L) 240 - 430 ug/dL    Iron Saturation Index 98 (H) 15 - 46 %   Vitamin B12    Collection Time: 06/14/17  8:12 AM   Result Value Ref Range    Vitamin B12 1832 (H) 193 - 986 pg/mL   Folate    Collection Time: 06/14/17  8:12 AM   Result Value Ref Range    Folate 26.9 >5.4 ng/mL   Lactate Dehydrogenase    Collection Time: 06/14/17  8:12 AM   Result Value Ref Range    Lactate Dehydrogenase 202 81 - 234 U/L   Bilirubin direct    Collection Time: 06/14/17  8:12 AM   Result Value Ref Range    Bilirubin Direct 6.9 (H) 0.0 - 0.2 mg/dL     B/P: 134/85, T: 97.5, P: 97, R: 16    Impression:   Kavitha appears anxious, but is very ill, hepatic impairment is significant with polypharnacy. We will utilize seroquel for sleep/anxiety in low doses-make downward adjustments in other meds      DIagnoses:   1. Alcohol use disorder, severe  2. Anxiety disorder NOS  3. Severe hepatic impairment and thrombocytopenia from alcohol use       Plan:   1. Written information given on medications. Side effects, risks, benefits reviewed.  2. Stop trazadone, restoril, and lower remeron to 15mg qhs, lower tramadol to 25mg bid prn  3. Continue trintellex 5mg qam, add seroquel 25mg qhs, 15.5mg tid prn/anxiety  4. Suggest IP hospital based CD tx when medically clear. [facility] may be best option      Attestation:  Patient has been seen and evaluated by me,  Moe Casillas MD[PR1.1]       Revision History        User Key Date/Time User Provider Type Action    > PR1.1 6/15/2017  8:13 AM Moe Casillas MD Physician Sign            Consults by Moe Casillas MD at 6/15/2017  7:40 AM     Author:  Moe Casillas  MD [place] Service:  Psychiatry Author Type:  Physician    Filed:  6/15/2017  7:40 AM Date of Service:  6/15/2017  7:40 AM Creation Time:  6/15/2017  7:40 AM    Status:  Signed :  Moe Casillas MD (Physician)     Consult Orders:    1. Psychiatry IP Consult: Follow up anxiety, for medication adjustment.; Consultant may enter orders: Yes; Patient to be seen: Routine; Call back #: [phone] [[id number]] ordered by Lina Caldera MD at 06/14/17 0950                Pt seen for psychiatric consult follow-up, see my note    Moe Casillas MD[PR1.1]      Revision History        User Key Date/Time User Provider Type Action    > PR1.1 6/15/2017  7:40 AM Moe Casillas MD Physician Sign            Consults signed by Nahum Pa MD at 6/14/2017  2:13 PM      Author:  Nahum Pa MD Service:  Hem/Onc Author Type:  Physician    Filed:  6/14/2017  2:13 PM Date of Service:  6/13/2017  4:02 PM Creation Time:  6/13/2017  7:18 PM    Status:  Signed :  Nahum Pa MD (Physician)         REASON FOR CONSULTATION:  This consult has been requested by Dr. Caldera for thrombocytopenia.      HISTORY OF PRESENT ILLNESS:  Ms. Kavitha Headley is a 71-year-old female who has been admitted to the hospital with bilateral lower extremity cellulitis and abnormal liver function tests.  The patient has multiple medical problems, including hypertension, dyslipidemia and alcohol abuse.  In the hospital, the patient has developed thrombocytopenia.  Heparin-induced thrombocytopenia has been ruled out.  Labs are reviewed and summarized below.      1.  On 05/26/2017, WBC of 8.1, hemoglobin of 13.6 and platelets of 242.     2.  On 06/06/2017:  -Platelets of 217.  Her platelets then started to progressively decrease.  It decreased to 51 on 06/12/2017.  WBC has remained normal.  Hemoglobin decreased slightly to 11.3.   -Chemistry panel on 06/06/2017 revealed elevated AST, ALT, alkaline phosphatase and  bilirubin.   3.  Ultrasound abdomen on 06/07/2017 reveals fatty infiltration of the liver.    4. Hepatitis B and C on 06/09/2017 are negative.   5.  Multiple labs done on 06/11/2017:   -INR 1.15.   -Fibrinogen of 194.   -D-dimer of 1.8.   -HIT antibody negative.      The patient was admitted with bilateral leg cellulitis.  On admission, she was started on vancomycin.  It was discontinued after 2 doses.  She was on IV Ancef.  Now she is on oral clindamycin.  Before admission, the patient had been treated with oral clindamycin, minocycline and Keflex.      The patient denies any previous history of blood disorder.  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  She never was told to have low platelets.  I reviewed the CBC going back to 2006.  She did not have thrombocytopenia.      The patient's other problem is alcohol abuse.  She has been drinking on and off since the age of 28.  The patient says that a few months ago she lost her job.  Since then she has been drinking more.  She drinks every day.  Her LFTs are elevated.  She has been evaluated by gastroenterologist.  EUS on 06/12/2017 reveals LA grade C esophagitis.  There is finding of mild chronic pancreatitis.  Dr. Graham (GI) thinks that the patient most likely has underlying cirrhosis.  Acute elevation of LFTs secondary to alcohol abuse.      I met with the patient.  Daughter was in the room.  The patient is anxious and nervous.  The patient denies bleeding from any site.  No bleeding from ear, nose or throat.  No blood in the urine or stool.  No black stool.  She has some easy bruising.      REVIEW OF SYSTEMS:  Some headache.  Some dizziness.  No chest pain.  No shortness of breath.  No vomiting.  Some nausea.  No urinary complaints.  She has some constipation.      ALLERGIES:  Reviewed.      MEDICATIONS:  Reviewed.      PAST MEDICAL HISTORY:   1.  Alcohol abuse.   2.  Hypertension.   3.  Dyslipidemia.   4.  Mild coronary artery disease.   5.  Lower  extremity cellulitis.   6.  Gastric bypass surgery for weight loss.   7.  Back pain.   8.  Depression/anxiety.     9.  Moderate aortic stenosis.     10.  Supraventricular tachycardia.   11.  History of chronic kidney disease.   12.  Bilateral knee replacement.     13.  Right hip replacement.   14.  Appendectomy.   15.  Carpal tunnel surgery.    16.  Cholecystectomy.     17.  Cystocele repair.     18.  Mammoplasty.   19.  Hysterectomy with bilateral salpingo-oophorectomy.   20.  Laparotomy with lysis of adhesions and ventral hernia repair.      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    -No history of smoking.    -She has been drinking alcohol since age of 28.  Lately she has been drinking more alcohol.      FAMILY HISTORY:    -Mother  of some rare blood disorder.  It sounds like TTP.    -Father  of COPD and lung cancer.    -She has 2 sisters and 1 brother who are in good health.      PHYSICAL EXAMINATION:   GENERAL:  She was alert and oriented x3.   VITAL SIGNS:  Reviewed.   EYES:  No icterus.   THROAT:  No ulcer or thrush.   NECK:  Supple.  No lymphadenopathy.   AXILLAE:  No lymphadenopathy.   ABDOMEN:  Soft, nontender.  No mass felt.  Difficult palpation because of her weight.   EXTREMITIES:  Mild edema.   SKIN:  No petechiae.  A few ecchymotic spots on the upper extremities, mainly around the IV insertion site.      LABORATORY DATA:  Reviewed.      ASSESSMENT:   1.  A 71-year-old female with acute thrombocytopenia.     2.  Alcoholic liver disease   3.  Lower extremity cellulitis on antibiotics.   4.  Elevated liver function tests.      RECOMMENDATIONS:   1.  I had a long discussion with the patient and her daughter.  Reviewed the labs.  Discussed regarding thrombocytopenia.  When the patient was admitted, her platelets were normal.  They were 217 on 2017. It decreased 51 on ; today they are better at 64.      Different causes of thrombocytopenia discussed.  Basic mechanism is  decreased production or increased destruction.  As her thrombocytopenia is acute, it goes more in favor of increased destruction. We discussed regarding various causes including ITP, drug-induced thrombocytopenia and liver disease.  Heparin-induced thrombocytopenia has been ruled out.      Patient's thrombocytopenia is multifactorial. The patient has cellulitis.  She received outpatient oral antibiotics.  On admission, she received vancomycin for 2 doses.  Antibiotics can cause thrombocytopenia.  Vancomycin has been known to rarely cause immune thrombocytopenia.  This was published in the [place] Journal on 2007.  INR and D-dimer mildly elevated.  Fibrinogen is slightly low.  This would all go in favor of low-grade disseminated intravascular coagulation.  Alcohol abuse and liver disease also contributing to it.  At this time, I am not suspecting primary bone marrow pathology.      We will get some baseline labs, including vitamin B12, folate, serum protein electrophoresis.      I am hoping that her platelets will improve or normalize.  If her platelets do not improve significantly, we will consider doing a bone marrow biopsy.      2.  Patient advised to quit alcohol completely.  I told her that with continued alcohol use, she will have worsening liver function and its complication.      3.  For her elevated LFT, I would recommend getting a CT abdomen and pelvis to evaluate both liver and spleen.      4.  The patient and daughter had multiple questions, which were all answered.      Thanks for the consult.      Total time spent 60 minutes, more than half the time spent in counseling.         CHRISTIE DAY MD             D: 2017 16:02   T: 2017 18:16   MT: [id number]      Name:     DARWIN JASSO   MRN:      [id number]        Account:       [id number]   :      1945           Consult Date:  2017      Document: [id number][BK1.1]         Revision History        User Key Date/Time User  "Provider Type Action    > BK1.1 6/14/2017  2:13 PM Nahum Pa MD Physician Sign     [N/A] 6/13/2017  7:18 PM Nahum Pa MD Physician Edit            Consults by Elsie Moraes RD, LD at 6/13/2017 10:43 AM     Author:  Elsie Moraes RD, LD Service:  Nutrition Author Type:  Registered Dietitian    Filed:  6/13/2017 10:43 AM Date of Service:  6/13/2017 10:43 AM Creation Time:  6/13/2017 10:27 AM    Status:  Signed :  Elsie Moraes RD, LD (Registered Dietitian)     Consult Orders:    1. Nutrition Services Adult IP Consult [[id number]] ordered by Lina Caldera MD at 06/13/17 0848                CLINICAL NUTRITION SERVICES  -  ASSESSMENT NOTE      Recommendations Ordered by Registered Dietitian (MERLE):   Ensure Shakes BID between meals     Malnutrition: Patient does not meet two of the above criteria necessary for diagnosing malnutrition        REASON FOR ASSESSMENT  Kavitha Headley is a 71 year old female seen by Registered Dietitian for Provider Order - education - cirrhosis, hypoalbuminemia and LOS      NUTRITION HISTORY  - Information obtained from the pt.  She had gastric bypass surgery 10 years ago, she is used to small meals.   Pt says she has a poor diet, admits to drinking ETOH.  She doesn't eat breakfast, often doesn't eat lunch or may have a sandwich. She cooks dinner and eats a small amount.  Per H&P, she has 2-3 cocktails every night.      CURRENT NUTRITION ORDERS  Diet Order:     2000 mg Sodium     Current Intake/Tolerance:  Pt's intake is poor, 25-75% per flow sheets. She c/o constipations, abd discomfort.  For breakfast she ate 4 bites on an omelet, some banana bread and is now snacking on her fruit.      PHYSICAL FINDINGS  Observed  Jaundice   Obtained from Chart/Interdisciplinary Team  Chronic LE edema - likely due to underlying liver disease and low albumin    ANTHROPOMETRICS  Height: 5' 4\"  Admit Weight: 85.2 kg  Body mass index is 32 kg/(m^2).  Weight Status:  Obesity Grade I " BMI 30-34.9  IBW: 54.5 kg +/- 10%  % IBW: 156%  Weight History: Pt states she weighed 293# before gastric bypass surgery 10 years ago.  Her admit wt of 85.2 kg has been stable.[CM1.1]  Wt Readings from Last 10 Encounters:   06/13/17 89.7 kg (197 lb 12 oz)   06/02/17 84.4 kg (186 lb)   05/30/17 87.7 kg (193 lb 6.4 oz)   05/26/17 84.8 kg (187 lb)   05/09/17 84.9 kg (187 lb 3.2 oz)   01/17/17 88 kg (194 lb)   01/06/17 88.5 kg (195 lb)   12/12/16 85.3 kg (188 lb)   10/06/16 81.6 kg (180 lb)   09/21/16 84.6 kg (186 lb 6.4 oz)[CM1.2]       LABS  Alb 2.4 - Not a reliable indicator of malnutrition. Albumin and prealbumin are negative acute-phase reactants, they decrease when inflammation is present and rise when it is resolved.    MEDICATIONS  Folvite  Thera-Vit  Thiamin    Dosing Weight 62.2 kg - adjusted for obesity    ASSESSED NUTRITION NEEDS PER APPROVED PRACTICE GUIDELINES:  Estimated Energy Needs: 6802-0850 kcals (25-30 Kcal/Kg)  Justification: obese  Estimated Protein Needs: 75-95 grams protein (1.2-1.5 g pro/Kg)  Justification: preservation of lean body mass    MALNUTRITION:  % Weight Loss:  None noted  % Intake:  </= 50% for >/= 5 days (severe malnutrition)  Subcutaneous Fat Loss:  None observed  Muscle Loss:  None observed  Fluid Retention:  Chronic, likely not nutrition-related    Malnutrition Diagnosis: Patient does not meet two of the above criteria necessary for diagnosing malnutrition      NUTRITION DIAGNOSIS:  Inadequate oral intake related to altered GI function as evidenced by report of poor intake, 25-75%       NUTRITION INTERVENTIONS  Recommendations / Nutrition Prescription  Continue 2 gm Na diet  Nutritional supplements.      Implementation  Nutrition education: Not appropriate at this time due to pt's poor intake  Medical Food Supplement - will send Ensure Shakes BID between meals.      Nutrition Goals  Pt will consume at least 50% of meals and supplements.      MONITORING AND EVALUATION:  Progress  towards goals will be monitored and evaluated per protocol and Practice Guidelines    Elsie Moraes RD  Pager [phone] (M-F)            [phone] (W/E & Hol)[CM1.1]                     Revision History        User Key Date/Time User Provider Type Action    > CM1.2 6/13/2017 10:43 AM Elsie Moraes, MERLE, LD Registered Dietitian Sign     CM1.1 6/13/2017 10:27 AM Elsie Moraes RD, LD Registered Dietitian             Consults by Parth Graham MD at 6/9/2017  7:00 PM     Author:  Parth Graham MD Service:  Gastroenterology Author Type:  Physician    Filed:  6/9/2017  7:54 PM Date of Service:  6/9/2017  7:00 PM Creation Time:  6/9/2017  7:41 PM    Status:  Signed :  Parth Graham MD (Physician)     Consult Orders:    1. Gastroenterology IP Consult: Acute hepatitis; Consultant may enter orders: Yes; Patient to be seen: Routine - within 24 hours; Requested Provider: Dr. Graham [[id number]] ordered by Nik Dejesus MD at 06/09/17 1159                [facility]  Gastroenterology Consultation         Kavitha Headley  [address] [address]  71 year old female    Admission Date/Time: 6/6/2017  Primary Care Provider: Alison Pena  Referring / Attending Physician:  Dr. Dejesus    We were asked to see the patient in consultation by Dr. Dejesus for evaluation of JAundice      CC: Jaundice    HPI:  Kavitha Headley is a 71 year old female who past medical history of hypertension, dyslipidemia, obesity status post gastric bypass, alcohol abuse, chronic pain syndrome on a pain agreement, history of left lower extremity cellulitis, history of macrocytic anemia, moderate aortic stenosis, chronic kidney disease stage III, history of supraventricular tachycardia, depression/anxiety, who came in for evaluation of bilateral lower extremity swelling and erythema.  She reports that usually her legs are not swollen, although she does take Lasix at home p.r.n. for  leg swelling.  She states that she started having more swelling in her lower extremities 2 weeks ago.  Initially it was more obvious on left lower extremity.  Then, more obvious on the right lower extremity.  She went to see her primary care physician for this.  It seems that on 05/26 she was started on clindamycin. Apparently she started having nausea and diarrhea while she was on clindamycin, so when she saw her primary care physician again on 05/30, the clindamycin was stopped and she was started on minocycline.  On 06/02, she was seen again in her primary care physician's office and she was told to start taking Keflex as well in addition to minocycline.  Despite all these different antibiotics, she continued to have lower extremity swelling, redness and some warmth reportedly.  She states that for the last few days she started taking Lasix 20 mg daily, but despite this, swelling again did not improve.  Upon further questioning, she denies any fever at home.  She denies any chest pain.      Patient was admitted with above history on 6/7. Patient has long standing h/o moderate to heavy ETOH use.  Patient has been shaky. Patient has elevated LFTs. AST mor than ALT.  Patient has elevated of Bili. Mostly direct and elevated Alk phos. Low albumin, and low platelets.  Patient denying H/O fever, chills, chest pain.     ROS: A comprehensive ten point review of systems was negative aside from those in mentioned in the HPI.      PAST MED HX:  I have reviewed this patient's medical history and updated it with pertinent information if needed.[AB1.1]   Past Medical History:   Diagnosis Date     Alcohol abuse     long term alcohol abuse     Anxiety disorder      Bariatric surgery status 1996?    gastric bypass, [facility] and     Benign hypertension      Chronic insomnia      Chronic low back pain      Chronic pain syndrome     Chronic back and neck pain, chronic pain due to osteoarthritis multiple joints     Coronary artery  disease 9/2015    mild distal branch disease on cath     Disc disease, degenerative, cervical     C4-C7 disc disease     Diverticulitis     inpatient therapy 6/2006     Hip joint replacement status 4/2004    right     Knee joint replacement status 12/2005    left     Macrocytic anemia     Mild macrocytic anemia, 2012 to present, likely based on alcohol abuse.     Major depressive disorder, single episode, severe, without mention of psychotic behavior      Mixed hyperlipidemia      Moderate aortic stenosis 5/2014    mild/mod AS with peak gradient 33/mean gradient 20 mmHg, AV area 1.2     Pelvic relaxation disorder     Surgical intervention for cystocele/rectocele 3,11/2012     Personal history of urinary calculi 6/2006    left ureteral stone,lithotripsy     PVC (premature ventricular contraction)      Stage III chronic kidney disease 2005     SVT (supraventricular tachycardia) (H)     likely atrial tachycardia[AB1.2]       MEDICATIONS:[AB1.1]   Prior to Admission Medications   Prescriptions Last Dose Informant Patient Reported? Taking?   GABAPENTIN PO 6/5/2017 at hs  Yes Yes   Sig: Take 600 mg by mouth At Bedtime   GABAPENTIN PO prn  Yes Yes   Sig: Take 300 mg by mouth daily as needed In the morning if needed   Multiple Vitamins-Minerals (CENTRUM SILVER) per tablet 6/5/2017  Yes Yes   Sig: Take 1 tablet by mouth daily   Potassium Chloride ER 20 MEQ TBCR NEW  No No   Sig: Take 1 tablet (20 mEq) by mouth daily   aspirin 81 MG tablet 6/5/2017 at am  Yes Yes   Sig: Take 81 mg by mouth daily   atorvastatin (LIPITOR) 40 MG tablet 6/5/2017 at hs  No Yes   Sig: Take 1 tablet (40 mg) by mouth daily   cephALEXin (KEFLEX) 500 MG capsule 6/5/2017  No Yes   Sig: Take 1 capsule (500 mg) by mouth 4 times daily for 10 days   furosemide (LASIX) 20 MG tablet 6/5/2017 at am  No Yes   Sig: Take 1 tablet (20 mg) by mouth daily   metoclopramide (REGLAN) 5 MG tablet 6/5/2017 at x2  No Yes   Sig: Take 1 tablet (5 mg) by mouth 4 times daily  as needed   metoprolol (TOPROL XL) 25 MG 24 hr tablet 6/6/2017 at am  No Yes   Sig: Take 1 tablet (25 mg) by mouth daily   minocycline (MINOCIN/DYNACIN) 100 MG capsule 6/5/2017  No Yes   Sig: Take 1 capsule (100 mg) by mouth 2 times daily for 10 days   mirtazapine (REMERON) 45 MG tablet 6/5/2017 at hs  No Yes   Sig: Take 1 tablet (45 mg) by mouth At Bedtime   naproxen (NAPROSYN) 500 MG tablet prn  No Yes   Sig: Take 1 tablet (500 mg) by mouth 2 times daily as needed for moderate pain   temazepam (RESTORIL) 15 MG capsule prn  No Yes   Sig: TAKE 1 CAPSULES BY MOUTH AT BETIME AS NEEDED TO SLEEP. MUST LAST 30 DAYS.   traMADol (ULTRAM) 50 MG tablet prn  No Yes   Sig: TAKE 1 TABLET BY MOUTH TWICE DAILY AS NEEDED FOR MODERATE PAIN   traZODone (DESYREL) 100 MG tablet 6/5/2017 at hs  No Yes   Sig: TAKE 1 TABLET (100 MG) BY MOUTH AT BEDTIME   valACYclovir (VALTREX) 1000 mg tablet prn  No Yes   Sig: TAKE 2 TABS EVERY 12 HRS FOR 1 DAY ONLY FOR OUTBREAKS OF COLD SORES as needed      Facility-Administered Medications: None[AB1.2]       ALLERGIES:[AB1.1]   Allergies   Allergen Reactions     Bactrim [Sulfamethoxazole W/Trimethoprim] Hives     Codeine Itching     NAUSEA     Morphine Itching     NAUSEA[AB1.2]       SOCIAL HISTORY:[AB1.1]  Social History   Substance Use Topics     Smoking status: Never Smoker     Smokeless tobacco: Never Used     Alcohol use 0.0 oz/week     0 Standard drinks or equivalent per week      Comment: 2-3 drinks per day[AB1.2]       FAMILY HISTORY:[AB1.1]  Family History   Problem Relation Age of Onset     Substance Abuse Father      CANCER Father      throat and lung mets     DIABETES No family hx of      Coronary Artery Disease No family hx of      CEREBROVASCULAR DISEASE No family hx of[AB1.2]        PHYSICAL EXAM:   General  Awake, alert oriented, tremors  Vital Signs with Ranges[AB1.1]  Temp: 98.5  F (36.9  C) Temp src: Oral BP: 110/70   Heart Rate: 81 Resp: 18 SpO2: 96 % O2 Device: None (Room air)     I/O last 3 completed shifts:  In: 850 [P.O.:750; I.V.:100]  Out: 335 [Urine:335][AB1.2]    Constitutional: healthy, alert and no distress   Cardiovascular: negative, PMI normal. No lifts, heaves, or thrills. RRR. No murmurs, clicks gallops or rub  Respiratory: negative, Percussion normal. Good diaphragmatic excursion. Lungs clear  Head: Normocephalic. No masses, lesions, tenderness or abnormalities  Neck: Neck supple. No adenopathy. Thyroid symmetric, normal size,, Carotids without bruits.  Abdomen: Abdomen soft, non-tender. BS normal. No masses, Liver 3 fingers below costal margin.  NEURO: Gait normal. Reflexes normal and symmetric. Sensation grossly WNL.  SKIN: no suspicious lesions or rashes  LYMPH: Normal cervical lymph nodes          ADDITIONAL COMMENTS:   I reviewed the patient's new clinical lab test results.[AB1.1]   Recent Labs   Lab Test  06/09/17   0745  06/08/17   0746  06/07/17   0720   09/29/15   0840   04/11/12   0600   WBC  7.4  6.3  6.2   < >  5.6   < >   --    HGB  12.1  11.1*  11.3*   < >  10.1*   < >   --    MCV  102*  100  99   < >  92   < >   --    PLT  118*  143*  166   < >  327   < >   --    INR   --    --   1.02   --   0.86   --   0.93    < > = values in this interval not displayed.     Recent Labs   Lab Test  06/09/17   0745  06/08/17   0746  06/07/17 2010 06/07/17   0720   POTASSIUM  4.4  4.3  3.7   < >  2.8*   CHLORIDE  84*  85*   --    --   85*   CO2  34*  37*   --    --   36*   BUN  26  22   --    --   18   ANIONGAP  8  7   --    --   9    < > = values in this interval not displayed.     Recent Labs   Lab Test  06/09/17   1335  06/09/17   0745  06/08/17   0746  06/07/17   0720  06/06/17   2300   10/19/15   1447   11/06/10   1415   ALBUMIN   --   2.4*  2.3*  2.2*   --    < >   --    < >  3.7   BILITOTAL   --   5.4*  4.1*  2.4*   --    < >   --    < >  0.5   ALT   --   48  82*  115*   --    < >   --    < >  35   AST   --   110*  142*  212*   --    < >   --    < >  60*   PROTEIN  10*    --    --    --   Negative   --   Negative   < >   --    LIPASE   --    --    --    --    --    --    --    --   139    < > = values in this interval not displayed.[AB1.2]       I reviewed the patient's new imaging results.        CONSULTATION ASSESSMENT AND PLAN:    Active Problems:    Cellulitis    Assessment: 73 year old female with acute on chronic hepatitis. Likely multifactorial. ETOH, drug induced likely antibiotics, and infection. Cholestatic and hepatitis mix pateren  Patient has signs of chronic liver disease and likely Cirrhosis. Possible malnutrition.  Patient has signs of possible withdrawal.      Plan: Continue on current plan.  Check autoimmune markers.  Awaiting hepatitis serologies.  Repeat labs including INR and ammonia levels.  Appreciate nephrology input. Hyponatremia.    Thank you very much for letting us participate in her care.[AB1.1]          Parth Graham MD[AB1.2], [facility].  Office: [phone]  Cell : [phone][AB1.1]     Revision History        User Key Date/Time User Provider Type Action    > AB1.2 6/9/2017  7:54 PM Parth Graham MD Physician Sign     AB1.1 6/9/2017  7:41 PM Parth Graham MD Physician             Consults by Tesfaye العراقي MD at 6/9/2017  1:35 PM     Author:  Tesfaye العراقي MD Service:  Nephrology Author Type:  Physician    Filed:  6/9/2017  1:35 PM Date of Service:  6/9/2017  1:35 PM Creation Time:  6/9/2017 12:58 PM    Status:  Signed :  Tesfaye العراقي MD (Physician)     Consult Orders:    1. Nephrology IP Consult: Patient to be seen: Routine - within 24 hours; LYLY; Consultant may enter orders: Yes [[id number]] ordered by Nik Dejesus MD at 06/09/17 1050                RENAL CONSULTATION NOTE    REFERRING MD:  Nik Dejesus MD    REASON FOR CONSULTATION:  LYLY    HPI:  71 y.o woman with mild CAD, hypertension, hyperlipidemia, obesity s/p gastric bypass, who was admitted on 8/7 for  "bilateral lower extremities edema. Pt is had ativan earlier, and she is a little groggy now. Her daughter is at the bedside. She says the lower extremities edema started about three weeks ago. She also had an \"angry\" red looking skin in the lower extremities. She was diagnosed with cellulitis of the legs, and her PCP started clindamycin, then minocycline then Keflex. Despite these antibiotics she was not getting better, so she came in for further evaluation. She was started on a couple dose so Keflex. Currently, she is on Clindamycin. Per Dr. Arreguin's note, he recommended Keflex.     Pt states she has not bee feeling well for three weeks. She describes it as generalized malaise. She denies fever and chill. She did not notice any unusual urinary symptoms prior to admission. She denies dysuria. She was taking 500 mg daily for pain. She says there were no new medications. She does not have any cardiopulmonary complaints.     She was admitted on 6/7 with a Scr of 1 mg/dl, which is around her baseline. It increase to 1.19 mg/dl on 6/8 and 1.4 mg daily today. She noticed she is making less urine.     ROS:  A complete review of systems was performed and is negative except as noted above.    PMH:    Past Medical History:   Diagnosis Date     Alcohol abuse     long term alcohol abuse     Anxiety disorder      Bariatric surgery status 1996?    gastric bypass, [facility] and     Benign hypertension      Chronic insomnia      Chronic low back pain      Chronic pain syndrome     Chronic back and neck pain, chronic pain due to osteoarthritis multiple joints     Coronary artery disease 9/2015    mild distal branch disease on cath     Disc disease, degenerative, cervical     C4-C7 disc disease     Diverticulitis     inpatient therapy 6/2006     Hip joint replacement status 4/2004    right     Knee joint replacement status 12/2005    left     Macrocytic anemia     Mild macrocytic anemia, 2012 to present, likely based on alcohol " abuse.     Major depressive disorder, single episode, severe, without mention of psychotic behavior      Mixed hyperlipidemia      Moderate aortic stenosis 5/2014    mild/mod AS with peak gradient 33/mean gradient 20 mmHg, AV area 1.2     Pelvic relaxation disorder     Surgical intervention for cystocele/rectocele 3,11/2012     Personal history of urinary calculi 6/2006    left ureteral stone,lithotripsy     PVC (premature ventricular contraction)      Stage III chronic kidney disease 2005     SVT (supraventricular tachycardia) (H)     likely atrial tachycardia       PSH:    Past Surgical History:   Procedure Laterality Date     APPENDECTOMY  3/2004    incidental     C GASTRIC BYPASS,OBESE<100CM ARIANNA-EN-Y  1996     C MEDIASTINOSCOPY W OR WO BIOPSY  2/2008    Videomediastinoscopy and, for mediastinal adenopathy -reactive lymphoid hyperplasia     C REPAIR OF RECTOCELE  3/2012     C TOTAL KNEE ARTHROPLASTY  12/2005    left      CARPAL TUNNEL RELEASE RT/LT  10/2010    Carpometacarpal excisional arthroplasty with a fascial autograft and APL suspension sling (49059). 2. Left thumb metacarpophalangeal joint fusion with autologous bone graft (39554). 3. Left endoscopic carpal tunnel release      CHOLECYSTECTOMY, LAPOROSCOPIC  11/2010    Cholecystectomy, Laparoscopic     COLONOSCOPY N/A 9/8/2016    Procedure: COMBINED COLONOSCOPY, SINGLE OR MULTIPLE BIOPSY/POLYPECTOMY BY BIOPSY;  Surgeon: Moe Barlow MD;  Location:  GI     CYSTOCELE REPAIR  11/2012    davinc laparoscopic sacrocolpopexy, enterocele repair, lysis of adhesions, placement of retropubic mid urethral sling, cystoscopy     CYSTOSCOPY, LITHOTRIPSY, COMBINED  6/2006    Left extracorporeal shock wave lithotripsy, cystoscopy, left ureteral stent placement.     CYSTOSCOPY, REMOVE STENT(S), COMBINED  7/2006    Cystoscopy, removal of left ureteral stent, retrograde pyelography, flexible and rigid ureteroscopy and holmium laser lithotripsy, basket removal of stone  fragments, ureteral stent placement.      HERNIA REPAIR  4/2012    bilateral augmentation mastopexy, ventral hernia repair, and medial thigh liposuction on 04/06/2012.      HYSTERECTOMY VAGINAL, BILATERAL SALPINGO-OOPHERECTOMY, COMBINED  1998    due to myoma and bleeding     JOINT REPLACEMENT, HIP RT/LT  4/2004    right total hip arthroplasty     LAPAROTOMY, LYSIS ADHESIONS, COMBINED  3/2004    lysis adhesions, ventral hernia repair, appendectomy incidentally     LYMPH NODE BIOPSY  4/2008    right axillary, reactive follicular and paracortical hyperplasia.     MAMMOPLASTY AUGMENTATION BILATERAL  4/2012     REVISE RECONSTRUCTED BREAST  6/7/2012    Left breast capsulotomy.        MEDICATIONS:      vortioxetine  5 mg Oral Daily     clindamycin  300 mg Oral Q8H AMOR     aspirin EC  81 mg Oral Daily     gabapentin (NEURONTIN) tablet 600 mg  600 mg Oral At Bedtime     gabapentin (NEURONTIN) capsule 300 mg  300 mg Oral Daily     metoprolol  25 mg Oral Daily     mirtazapine  45 mg Oral At Bedtime     multivitamin, therapeutic  1 tablet Oral Daily     traZODone  100 mg Oral At Bedtime     sodium chloride (PF)  3 mL Intracatheter Q8H     folic acid  1 mg Oral Daily     vitamin  B-1  50 mg Oral Daily       ALLERGIES:    Allergies as of 06/06/2017 - Review Complete 06/06/2017   Allergen Reaction Noted     Bactrim [sulfamethoxazole w/trimethoprim] Hives 10/19/2015     Codeine Itching 12/03/2003     Morphine Itching 12/03/2003       FH:    Family History   Problem Relation Age of Onset     Substance Abuse Father      CANCER Father      throat and lung mets     DIABETES No family hx of      Coronary Artery Disease No family hx of      CEREBROVASCULAR DISEASE No family hx of        SH:    Social History     Social History     Marital status:      Spouse name: Mt     Number of children: 4     Years of education: 18     Occupational History     nurse       [employer]     Social History Main Topics     Smoking status: Never  "Smoker     Smokeless tobacco: Never Used     Alcohol use 0.0 oz/week     0 Standard drinks or equivalent per week      Comment: 2-3 drinks per day     Drug use: No     Sexual activity: Yes     Partners: Male     Other Topics Concern     Blood Transfusions No     Caffeine Concern Yes     1-2 cups per day      Occupational Exposure Yes     blood     Hobby Hazards No     Sleep Concern Yes     Stress Concern Yes     Weight Concern Yes     gastric  byepass     Special Diet No     Back Care No     Exercise Yes     walk, swin     Bike Helmet No     Seat Belt Yes     Self-Exams Yes     Social History Narrative       PHYSICAL EXAM:    /70 (BP Location: Left arm)  Pulse 90  Temp 99  F (37.2  C) (Oral)  Resp 18  Ht 1.626 m (5' 4\")  Wt 85.7 kg (188 lb 15 oz)  SpO2 93%  BMI 32.43 kg/m2  GENERAL: NAD. Groggy.  HEENT:  Normocephalic. No gross abnormalities.  Pupils equal. Lips are dry.  CV: RRR, + AS murmur, no clicks, gallops, or rubs, 1+ edema, no carotid bruits  RESP: Poor airflow. No wheezes or crackles  GI: Abdomen obese, soft, NT  MUSCULOSKELETAL: 1+ bilateral LE edema. Redness seems better.  SKIN: Erythema improved  NEURO:  A?O x 3. Groggy.  PSYCH: mood good, affect appropriate  LYMPH: No palpable ant/post cervical    LABS:      CBC RESULTS:     Recent Labs  Lab 06/09/17  0745 06/08/17  0746 06/07/17  0720 06/06/17  1457   WBC 7.4 6.3 6.2 6.3   RBC 3.31* 3.01* 3.06* 3.43*   HGB 12.1 11.1* 11.3* 12.9   HCT 33.6* 30.2* 30.2* 34.8*   * 143* 166 217       BMP RESULTS:    Recent Labs  Lab 06/09/17  0745 06/08/17  0746 06/07/17 2010 06/07/17  1720 06/07/17  0720 06/06/17  2155 06/06/17  1457   * 129*  --   --  130*  --  129*   POTASSIUM 4.4 4.3 3.7 Canceled, Test credited Specimen Lost, Testing unable to be completedDIEGO BREAUX RN IN 66 NOTIFIED @ 2005 FK 2.8* 3.1* 2.5*   CHLORIDE 84* 85*  --   --  85*  --  80*   CO2 34* 37*  --   --  36*  --  31   BUN 26 22  --   --  18  --  20   CR 1.42* 1.19*  --   " --  0.88 0.89 1.02   GLC 88 64*  --   --  66*  --  99   BIRGIT 8.5 8.3*  --   --  7.9*  --  8.8       INR  Recent Labs  Lab 06/07/17  0720   INR 1.02        DIAGNOSTICS:  Reviewed    LE ultrasound: no DVT  TTE: Mod AS. Grade DD, normal EF    A/P:  71 y.o woman with CAD, hypertension, hyperlipidemia and obesity, admitted for bilateral LE cellulitis, consulted for LYLY.    1. Pt was admitted  On 6/7 with a serum creatinine of 1 mg/dl    2. LYLY. Presumed prerenal. Pt seems dry. She is not making much urine.     3. Bilateral cellulitis. On clindamycin. ID recommended Keflex    4. 1+ peripheral edema. TTE with diastolic dysfunction with preserved EF. Presumed from cellulitis for now.    5. Hyponatremia. This could be from psych medications, but I think she is dry.     6. Abnormal liver enzymes and TB?    Plan.   1. Check UA with micr, FENa, UPCR  2. Check dedicate renal ultrasound if kidney function continues to decline  3. Start NS at 125 cc x 1-2 liters  4. Decrease Toprol XL to 12.5 mg daily  5. Avoid NSAIDs  6. Avoid diuretic for now    Tesfaye العراقي MD  [facility] - Nephrology  Office Phone: [phone]  Pager: [phone][OP1.1]     Revision History        User Key Date/Time User Provider Type Action    > OP1.1 6/9/2017  1:35 PM Tesfaye العراقي MD Physician Sign            Consults by Colton Vargas MD at 6/9/2017 11:07 AM     Author:  Colton Vargas MD Service:  Psychiatry Author Type:  Physician    Filed:  6/9/2017 11:12 AM Date of Service:  6/9/2017 11:07 AM Creation Time:  6/9/2017 10:49 AM    Status:  Signed :  Colton Vargas MD (Physician)         Shemar Ulrich Initial Psychiatric Consult Note      TIME SPENT IN PSYCHIATRY INITIAL CONSULT: 55 MINUTES    Consult ordered by: Nik Dejesus MD.  Reason: Depression, anxiety, EtOH.     Initial History     The patient's care was discussed, patient seen and chart notes were reviewed.    Patient examined for psychiatric  consultation.     IDENTIFICATION    Pt is a 71 year old[TW1.1] engaged[TW1.2]  female with four children. Pt sees PCP Alison Macias. She has seen a psychiatrist in the past, but does not currently see one now. She has had one previous CD treatment. Pt seen on 66 for depression, anxiety, and alcohol use.    HISTORY OF PRESENT ILLNESS     Ms. Kavitha Hartman is a pleasant 71-year-old female with past medical history of alcohol abuse, chronic pain syndrome on a pain agreement, depression, and anxiety who presented to [facility] for evaluation of bilateral lower extremity swelling and erythema.  She reported that usually her legs are not swollen, although she does take Lasix at home as needed for leg swelling.  She states that she started having more swelling in her lower extremities 2 weeks ago. She saw her PCP regarding this, but was unable to obtain relief, thus prompting her presentation to [facility]. She states that in August of this past year, she was unable to continue working as nurse and exacerbated her depression and anxiety with this change in her lifestyle. Pt has severely depressed mood, motivation poor, concentration poor, low energy, hopeless, helpless, anhedonic. Pt is an anxious person, a worrier. Pt endorses consistent, pervasive sense of anxiety and worry. Pt endorses symptoms related to excessive alcohol use, including over drinking, unsuccessful attempts to curb use, craving its use, having it interfere with work and personal relationships, and continued use despite known bad consequences. Pt also has increased tolerance. Her LFTs are elevated on admission, consistent with alcohol use.     CHEMICAL DEPENDENCY HISTORY    She reports drinking 2-3 cocktails per night.  She denies having history of alcohol withdrawal in the past. She states she had attended CD treatment seven years ago at [place]. Utox not completed on admission.    PAST PSYCHIATRIC HISTORY    She is currently maintained on Remeron  "45mg qhs, Trazodone 100mg, Temazepam 15mg, and Gabapentin 900mg.[TW1.1] No prior psychiatric hospitalizations.[TW1.2]    FAMILY HISTORY[TW1.1]    She states that many people in her family have unreported issues with alcohol. Pt reports that her daughter has issues with depression.[TW1.2]    SOCIAL HISTORY[TW1.1]    Pt grew up in [place] as the middle of five children. Pt's mother passed when she was [age]. 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 She has been  once. She had four children from this marriage. She reports that marck also uses alcohol and has had two DWIs.[TW1.2]     Medications     Prescriptions Prior to Admission   Medication Sig Dispense Refill Last Dose     GABAPENTIN PO Take 600 mg by mouth At Bedtime   6/5/2017 at hs     GABAPENTIN PO Take 300 mg by mouth daily as needed In the morning if needed   prn     cephALEXin (KEFLEX) 500 MG capsule Take 1 capsule (500 mg) by mouth 4 times daily for 10 days 40 capsule 0 6/5/2017     metoclopramide (REGLAN) 5 MG tablet Take 1 tablet (5 mg) by mouth 4 times daily as needed 40 tablet 0 6/5/2017 at x2     minocycline (MINOCIN/DYNACIN) 100 MG capsule Take 1 capsule (100 mg) by mouth 2 times daily for 10 days 20 capsule 0 6/5/2017     furosemide (LASIX) 20 MG tablet Take 1 tablet (20 mg) by mouth daily 90 tablet 1 6/5/2017 at am     traZODone (DESYREL) 100 MG tablet TAKE 1 TABLET (100 MG) BY MOUTH AT BEDTIME 90 tablet 1 6/5/2017 at hs     valACYclovir (VALTREX) 1000 mg tablet TAKE 2 TABS EVERY 12 HRS FOR 1 DAY ONLY FOR OUTBREAKS OF COLD SORES as needed 4 tablet 3 prn     temazepam (RESTORIL) 15 MG capsule TAKE 1 CAPSULES BY MOUTH AT BETIME AS NEEDED TO SLEEP. MUST LAST 30 DAYS. 30 capsule 5 prn     mirtazapine (REMERON) 45 MG tablet Take 1 tablet (45 mg) by mouth At Bedtime 90 tablet 1 6/5/2017 at hs     traMADol (ULTRAM) 50 MG tablet TAKE 1 TABLET BY MOUTH TWICE DAILY AS NEEDED FOR MODERATE PAIN 60 " tablet 3 prn     naproxen (NAPROSYN) 500 MG tablet Take 1 tablet (500 mg) by mouth 2 times daily as needed for moderate pain 90 tablet 1 prn     metoprolol (TOPROL XL) 25 MG 24 hr tablet Take 1 tablet (25 mg) by mouth daily 90 tablet 3 6/6/2017 at am     atorvastatin (LIPITOR) 40 MG tablet Take 1 tablet (40 mg) by mouth daily 90 tablet 1 6/5/2017 at hs     Multiple Vitamins-Minerals (CENTRUM SILVER) per tablet Take 1 tablet by mouth daily   6/5/2017     aspirin 81 MG tablet Take 81 mg by mouth daily   6/5/2017 at am     Potassium Chloride ER 20 MEQ TBCR Take 1 tablet (20 mEq) by mouth daily 30 tablet 3 NEW       Scheduled Medications    ceFAZolin  1 g Intravenous Q8H     aspirin EC  81 mg Oral Daily     gabapentin (NEURONTIN) tablet 600 mg  600 mg Oral At Bedtime     gabapentin (NEURONTIN) capsule 300 mg  300 mg Oral Daily     metoprolol  25 mg Oral Daily     mirtazapine  45 mg Oral At Bedtime     multivitamin, therapeutic  1 tablet Oral Daily     traZODone  100 mg Oral At Bedtime     sodium chloride (PF)  3 mL Intracatheter Q8H     enoxaparin  40 mg Subcutaneous Q24H     folic acid  1 mg Oral Daily     vitamin  B-1  50 mg Oral Daily     PRNs:  LORazepam **OR** LORazepam, potassium chloride, potassium chloride, potassium chloride, potassium chloride with lidocaine, potassium chloride, magnesium sulfate, acetaminophen, traMADol, temazepam, naloxone, lidocaine, lidocaine 4%, sodium chloride (PF), ibuprofen, ondansetron **OR** ondansetron      Allergies      Allergies   Allergen Reactions     Bactrim [Sulfamethoxazole W/Trimethoprim] Hives     Codeine Itching     NAUSEA     Morphine Itching     NAUSEA        Previous Medical History     Past Medical History:   Diagnosis Date     Alcohol abuse      Anxiety disorder      Bariatric surgery status 1996?     Benign hypertension      Chronic insomnia      Chronic low back pain      Chronic pain syndrome      Coronary artery disease 9/2015     Disc disease, degenerative,  "cervical      Diverticulitis      Hip joint replacement status 4/2004     Knee joint replacement status 12/2005     Macrocytic anemia      Major depressive disorder, single episode, severe, without mention of psychotic behavior      Mixed hyperlipidemia      Moderate aortic stenosis 5/2014     Pelvic relaxation disorder      Personal history of urinary calculi 6/2006     PVC (premature ventricular contraction)      Stage III chronic kidney disease 2005     SVT (supraventricular tachycardia) (H)         Medical Review of Systems   /70 (BP Location: Left arm)  Pulse 90  Temp 99  F (37.2  C) (Oral)  Resp 18  Ht 1.626 m (5' 4\")  Wt 85.7 kg (188 lb 15 oz)  SpO2 93%  BMI 32.43 kg/m2  Body mass index is 32.43 kg/(m^2).  Previous 10-point ROS completed by Enedelia Rangel MD on 6/7/17 reviewed by Colton Vargas MD on June 9, 2017 and is unchanged except for those problems mentioned within the HPI.      Mental Status Examination     Appearance Lying in bed, dressed in gown. Appears stated age.   Attitude Cooperative   Orientation Oriented to person, place, time   Eye Contact Poor   Speech Regular rate, rhythm, volume and tone   Language Normal   Psychomotor Behavior Normal   Thought Process Goal-Oriented, Intact   Associations Intact   Thought Content Patient is currently negative for suicide ideation, negative for plan or intent, able to contract no self harm and identify barriers to suicide.  Negative for obsessions, compulsions or psychosis.      Mood Depressed   Affect Flat   Fund of Knowledge Average   Insight Impaired   Judgement Fair   Attention Span & Concentration Alert   Recent & Remote Memory Intact   Gait Normal      Labs   Labs reviewed.  Recent Results (from the past 24 hour(s))   Hepatic panel    Collection Time: 06/09/17  7:45 AM   Result Value Ref Range    Bilirubin Direct 4.8 (H) 0.0 - 0.2 mg/dL    Bilirubin Total 5.4 (H) 0.2 - 1.3 mg/dL    Albumin 2.4 (L) 3.4 - 5.0 g/dL    Protein Total 5.6 " (L) 6.8 - 8.8 g/dL    Alkaline Phosphatase 219 (H) 40 - 150 U/L    ALT 48 0 - 50 U/L     (H) 0 - 45 U/L   CBC with platelets differential    Collection Time: 06/09/17  7:45 AM   Result Value Ref Range    WBC 7.4 4.0 - 11.0 10e9/L    RBC Count 3.31 (L) 3.8 - 5.2 10e12/L    Hemoglobin 12.1 11.7 - 15.7 g/dL    Hematocrit 33.6 (L) 35.0 - 47.0 %     (H) 78 - 100 fl    MCH 36.6 (H) 26.5 - 33.0 pg    MCHC 36.0 31.5 - 36.5 g/dL    RDW 14.7 10.0 - 15.0 %    Platelet Count 118 (L) 150 - 450 10e9/L    Diff Method Automated Method     % Neutrophils 69.4 %    % Lymphocytes 13.4 %    % Monocytes 9.9 %    % Eosinophils 5.9 %    % Basophils 1.1 %    % Immature Granulocytes 0.3 %    Nucleated RBCs 1 (H) 0 /100    Absolute Neutrophil 5.2 1.6 - 8.3 10e9/L    Absolute Lymphocytes 1.0 0.8 - 5.3 10e9/L    Absolute Monocytes 0.7 0.0 - 1.3 10e9/L    Absolute Eosinophils 0.4 0.0 - 0.7 10e9/L    Absolute Basophils 0.1 0.0 - 0.2 10e9/L    Abs Immature Granulocytes 0.0 0 - 0.4 10e9/L    Absolute Nucleated RBC 0.1    Basic metabolic panel    Collection Time: 06/09/17  7:45 AM   Result Value Ref Range    Sodium 126 (L) 133 - 144 mmol/L    Potassium 4.4 3.4 - 5.3 mmol/L    Chloride 84 (L) 94 - 109 mmol/L    Carbon Dioxide 34 (H) 20 - 32 mmol/L    Anion Gap 8 3 - 14 mmol/L    Glucose 88 70 - 99 mg/dL    Urea Nitrogen 26 7 - 30 mg/dL    Creatinine 1.42 (H) 0.52 - 1.04 mg/dL    GFR Estimate 36 (L) >60 mL/min/1.7m2    GFR Estimate If Black 44 (L) >60 mL/min/1.7m2    Calcium 8.5 8.5 - 10.1 mg/dL        Impression   This is a 71 year old female with a history of depression and alcohol use. Discussed starting pt on Trintellix.  Reviewed the side effects, benefits, and complications of medication. Pt gave verbal agreement to begin Trintellix 5mg qam with intent to titrate to 10mg. Suggested that she discontinue the Temazepam. Dr. Vargas discussed the immediate negative effects of benzodiazapine use including increased fall risk and  lowered IQ. Discussed addiction risk. Also mentioned the long-term detrimental effects including increased risk of dementia. Pt verbalized understanding. Begin Seroquel[TW1.1] 50[TW1.2]mg qhs and 12.5-25mg q2h prn. She will continue with her current psychotropic medications. Strongly suggested that she take Antabuse on discharge in order to curb alcohol use.    Diagnoses     1. Major depression, recurrent, severe, without psychotic features.  2. Alcohol use disorder, severe.     Plan     1. Explained side effects, benefits and complications of medications to the patient.  2. Medication Changes: Begin Trintellix 5mg qam with intent to titrate to 10mg. Add Seroquel[TW1.1] 50[TW1.2]mg qhs[TW1.1] with repeat x1[TW1.2] and 12.5-25mg q2h prn. Antabuse on discharge.  3. Discussed treatment plan with patient and team.  4. Re-consult Psychiatry.      TIME SPENT IN PSYCHIATRY INITIAL CONSULT: 55 MINUTES     Attestation:   Patient has been seen and evaluated by meColton MD.    Patient ID:  Name: Kavitha Headley MRN: [id number]  Admission: 6/6/2017  YOB: 1945[TW1.1]      Revision History        User Key Date/Time User Provider Type Action    > TW1.2 6/9/2017 11:12 AM Colton Vargas MD Physician Sign     TW1.1 6/9/2017 10:49 AM Colton Vargas MD Physician             Consults by Colton Vargas MD at 6/9/2017 10:49 AM     Author:  Colton Vargas MD Service:  Psychiatry Author Type:  Physician    Filed:  6/9/2017 10:49 AM Date of Service:  6/9/2017 10:49 AM Creation Time:  6/9/2017 10:48 AM    Status:  Signed :  Colton Vargas MD (Physician)     Consult Orders:    1. Psychiatry IP Consult: Depression and anxiety and EtOH; Consultant may enter orders: Yes; Patient to be seen: Routine; Call back #: [id number] [[id number]] ordered by Nik Dejesus MD at 06/08/17 1410                Initial Psychiatric Consult: Time Spent: 55 Minutes  Colton SCHOFIELD  MD Alicia.[TW1.1]       Revision History        User Key Date/Time User Provider Type Action    > TW1.1 6/9/2017 10:49 AM Colton Vargas MD Physician Sign            Consults signed by Tono Arreguin MD at 6/9/2017  9:19 AM      Author:  Tono Arreguin MD Service:  Infectious Disease Author Type:  Physician    Filed:  6/9/2017  9:19 AM Date of Service:  6/7/2017  7:36 AM Creation Time:  6/7/2017  8:03 AM    Status:  Signed :  Tono Arreguin MD (Physician)         INFECTIOUS DISEASE CONSULTATION        DATE OF SERVICE:  06/07/2017       REQUESTING PHYSICIAN:   Dr. Rangel      REASON FOR CONSULTATION:  I was asked by Dr. Rangel to assess bilateral lower leg cellulitis.      IMPRESSION:   1.  Kavitha Headley is a 71-year-old female with history of morbid obesity.  Status post gastric bypass surgery.   2.  History of alcohol abuse.   3.  Chronic pain syndrome.   4.  Admitted with a diagnosis of bilateral lower leg cellulitis with failed outpatient antibiotic therapy.  I suspect that this more likely represents venous stasis disease of the legs rather than cellulitis given the bilateral nature of the redness, as well as absence of fever and leukocytosis.  5.  Sulfa allergy.      RECOMMENDATIONS:   1.  Switch to IV Ancef for the present.   2.  I asked the patient to stress leg elevation.   3.  Will follow clinical progress.      HISTORY OF PRESENT ILLNESS:  This is a 71-year-old female with multiple medical problems including those listed above.  She has previous history of morbid obesity, for which she has had a gastric bypass.  She has had issues with leg swelling in the past and reports previous history of some leg cellulitis.  She takes p.r.n. Lasix for leg swelling.  Over the past 12 days she has noticed bilateral lower extremity swelling and redness.  She saw her primary care physician for this and was started on oral clindamycin which caused nausea.  She was then switched to  minocycline and then Keflex was added.  Despite all of these antibiotics she continued to have lower extremity swelling and redness and thus she was admitted to the hospital.  She has not had any fever or chills.  Admission white blood count was normal.  She has been started on IV vancomycin.  Admission blood cultures are negative to date.      PAST MEDICAL HISTORY:   1.  Hypertension.   2.  Hyperlipidemia.   3.  Morbid obesity.  Status post gastric bypass.   4.  History of alcohol abuse.   5.  Chronic pain syndrome -- on pain agreement.   6.  Macrocytic anemia.   7.  Aortic stenosis.   8.  Chronic kidney disease.   9.  History of supraventricular tachycardia.   10.  Depression/anxiety.      ALLERGIES:  Bactrim -- rash.      SOCIAL HISTORY:  Lives at home.  Nonsmoker.  Two to 3 cocktails per night.      FAMILY HISTORY:  Father had COPD and lung cancer.  Mother  of a blood disorder.      REVIEW OF SYSTEMS:  Negative other than that described above.      PHYSICAL EXAMINATION:   VITAL SIGNS:  Temperature 99.1, blood pressure 106/54, heart rate 96 and regular.   GENERAL:  Well-developed, well-nourished, alert and oriented, does not look acutely ill.   SKIN:  No rashes or nodules.   HEENT:  Eyes:  No subconjunctival hemorrhages or scleral icterus.  Oropharynx without erythema or exudate.   NECK:  Supple, no thyromegaly.   LYMPH:  No cervical or axillary lymphadenopathy.   LUNGS:  Clear to auscultation, no use of accessory muscles of respiration.   COR:  S1, S2 normal, no S3, S4 or murmur.   ABDOMEN:  Soft, nontender, no mass or hepatosplenomegaly.   EXTREMITIES:  There is mild bilateral lower extremity erythema with 2+ lower leg edema bilaterally.  There is no warmth to the skin.      For further details of the patient's history, please refer to the chart.  Thank you very much for this consultation.         MARCIA BRANDON MD             D: 2017 07:36   T: 2017 08:02   MT: [email or web address]      Name:     ROMERO  DARWIN   MRN:      [id number]        Account:       [id number]   :      1945           Consult Date:  2017      Document: [id number]       cc: Enedelia Rangel MD   [SO1.1]   Revision History        User Key Date/Time User Provider Type Action    > SO1.1 2017  9:19 AM Tono Arreguin MD Physician Sign            Consults by Tono Arreguin MD at 2017  7:37 AM     Author:  Tono Arreguin MD Service:  (none) Author Type:  Physician    Filed:  2017  7:37 AM Date of Service:  2017  7:37 AM Creation Time:  2017  7:36 AM    Status:  Signed :  Tono Arreguin MD (Physician)         ID consult dictated.  More likely venous stasis than cellulitis.[SO1.1]     Revision History        User Key Date/Time User Provider Type Action    > SO1.1 2017  7:37 AM Tono Arreguin MD Physician Sign                     Progress Notes - Physician (Notes from 17 through 17)      Progress Notes by Valery Espana LSW at 2017 10:57 AM     Author:  Valery Espana LSW Service:  Social Work Author Type:      Filed:  2017 10:58 AM Date of Service:  2017 10:57 AM Creation Time:  2017 10:57 AM    Status:  Signed :  Valery Espana LSW ()         [facility] PROGRESS NOTE:     I: Pt has orders to DC to [facility] today. SW faxed orders and confirmed receipt; pt has a bed available for her today in the TCU. SW will discuss transportation options with pt and arrange as needed.   P: Pt will DC to TCU today-[facility]. Pt's dtr will likely transport her on DC. SW following.[AM1.1]     Valery Espana[AM1.2], MSW, LGSW [phone][AM1.1]     Revision History        User Key Date/Time User Provider Type Action    > AM1.2 2017 10:58 AM Valery Espana LSW  Sign     AM1.1 2017 10:57 AM Valery Espana LSW              Progress Notes by Nahum Pa MD at 6/15/2017  5:05 PM      Author:  Nahum Pa MD Service:  Hem/Onc Author Type:  Physician    Filed:  6/15/2017  5:12 PM Date of Service:  6/15/2017  5:05 PM Creation Time:  6/15/2017  5:05 PM    Status:  Signed :  Nahum Pa MD (Physician)         Ms. Kavitha Headley is a 71-year-old female who has been admitted to the hospital with bilateral lower extremity cellulitis and abnormal liver function tests.  The patient has multiple medical problems, including hypertension, dyslipidemia and alcohol abuse.      Hematology is following for acute thrombocytopenia.  HIT has been ruled out.  Thrombocytopenia is multifactorial from combination of antibiotics, liver disease, alcohol abuse and low-grade DIC.     Patient is stable.  Denies any bleeding from ear, nose or throat.  No blood in the urine or stool.  No fever.  No chills.    CT of abdomen and pelvis was done on 06/14/2017.  There is diffuse fatty infiltration of liver.  No intrahepatic or extrahepatic biliary dilatation.  No pancreatic abnormality.  Spleen size is normal.  There is a scattered bilateral inguinal lymph node.  Largest is 2.6 cm.  It appears to have a fatty hilum.  Probably benign.     Labs: CBC ordered.     Assessment and plan:  1.  Acute thrombocytopenia from combination of decompensated liver disease, alcohol abuse, low-grade DIC.  -Monitor CBC.  Transfuse platelet if bleeding or platelet below 20.  -We will consider bone marrow biopsy if there is worsening of thrombocytopenia or develops new cytopenia.     2.  Alcohol abuse.  -Psychiatry following. Advised not to resume alcohol.       3.  Patient had few questions which were answered.  We will continue to follow her.[BK1.1]     Revision History        User Key Date/Time User Provider Type Action    > BK1.1 6/15/2017  5:12 PM Nahum Pa MD Physician Sign            Progress Notes by Parth Graham MD at 6/14/2017  8:00 AM     Author:  Parth Graham MD Service:  Gastroenterology  Author Type:  Physician    Filed:  6/15/2017  5:08 PM Date of Service:  6/14/2017  8:00 AM Creation Time:  6/15/2017  5:05 PM    Status:  Signed :  Parth Graham MD (Physician)         [facility]  Gastroenterology Progress Note     Kavtiha Headley MRN# [id number]   YOB: 1945 Age: 71 year old          Assessment and Plan:   ETOH hepatitis and likely cirrhosis. Patient had EGD and EUS done yesterday. Patient appears awake ans skaky till. No astrixis. Patient had increase in LFTs today.   I will recommend to check  HSV studies are pending. EBV IgG positive.  CT scan today.  Psych consult for anxiety and Med changes if needed.   Continue on supportive care.   Continue on albumin.            Cellulitis of right lower extremity  Hypokalemia  Elevated LFTs  Thrombocytopenia (H)      Interval History:   no new complaints, denies shortness of breath, alert, oriented to person, place and time and has had a bowel movement in the last 24 hours              Review of Systems:   C: NEGATIVE for fever, chills, change in weight  E/M: NEGATIVE for ear, mouth and throat problems  R: NEGATIVE for significant cough or SOB  CV: NEGATIVE for chest pain, palpitations or peripheral edema             Medications:   I have reviewed this patient's current medications    mirtazapine  15 mg Oral At Bedtime     QUEtiapine  25 mg Oral At Bedtime     lactulose  10 g Oral BID     sucralfate  1 g Oral 4x Daily AC & HS     ranitidine  150 mg Oral BID     magnesium oxide  400 mg Oral Daily     vitamin  B-1  100 mg Oral Daily     vortioxetine  5 mg Oral Daily     metoprolol  12.5 mg Oral Daily     gabapentin (NEURONTIN) tablet 600 mg  600 mg Oral At Bedtime     gabapentin (NEURONTIN) capsule 300 mg  300 mg Oral Daily     multivitamin, therapeutic  1 tablet Oral Daily     sodium chloride (PF)  3 mL Intracatheter Q8H     folic acid  1 mg Oral Daily                  Physical Exam:   Vitals were  reviewed  Vital Signs with Ranges  Temp:  [97.5  F (36.4  C)-98.9  F (37.2  C)] 98.9  F (37.2  C)  Heart Rate:  [] 103  Resp:  [16-18] 18  BP: (107-137)/(73-85) 137/82  SpO2:  [94 %-99 %] 94 %  I/O last 3 completed shifts:  In: -   Out: 3150 [Urine:3150]  Cardiovascular: negative, PMI normal. No lifts, heaves, or thrills. RRR. No murmurs, clicks gallops or rub  Respiratory: negative, Percussion normal. Good diaphragmatic excursion. Lungs clear  Head: Normocephalic. No masses, lesions, tenderness or abnormalities  Neck: Neck supple. No adenopathy. Thyroid symmetric, normal size,, Carotids without bruits.  Abdomen: Abdomen soft, non-tender. BS normal. No masses, organomegaly  SKIN: no suspicious lesions or rashes           Data:   I reviewed the patient's new clinical lab test results.   Recent Labs   Lab Test  06/14/17   0812  06/13/17   0800  06/12/17   0730  06/11/17   1540   06/10/17   0823   06/07/17   0720   WBC   --   7.2  5.9   --    --   7.2   < >  6.2   HGB   --   11.7  11.3*   --    --   11.3*   < >  11.3*   MCV   --   103*  100   --    --   100   < >  99   PLT  61*  64*  51*   --    < >  77*   < >  166   INR   --    --    --   1.15*   --   1.19*   --   1.02    < > = values in this interval not displayed.     Recent Labs   Lab Test  06/14/17   0812  06/13/17   0800  06/12/17   0730   POTASSIUM  4.2  4.7  4.5   CHLORIDE  97  96  98   CO2  33*  32  32   BUN  10  13  12   ANIONGAP  4  4  3     Recent Labs   Lab Test  06/14/17   0812  06/13/17   1350  06/13/17   0800  06/12/17   0730   06/09/17   1335   06/06/17   2300   11/06/10   1415   ALBUMIN  2.5*   --   2.4*  2.1*   < >   --    < >   --    < >  3.7   BILITOTAL  8.1*   --   8.8*  5.2*   < >   --    < >   --    < >  0.5   ALT  25   --   22  18   < >   --    < >   --    < >  35   AST  65*   --   71*  54*   < >   --    < >   --    < >  60*   PROTEIN   --   30*   --    --    --   10*   --   Negative   < >   --    LIPASE   --    --    --    --    --    --     --    --    --   139    < > = values in this interval not displayed.       I reviewed the patient's new imaging results.    All laboratory data reviewed  All imaging studies reviewed by me.    Parth Graham MD,  6/15/2017  [facility]  Office : [phone]  Cell: [phone][AB1.1]     Revision History        User Key Date/Time User Provider Type Action    > AB1.1 6/15/2017  5:08 PM Parth Graham MD Physician Sign            Progress Notes by Parth Graham MD at 6/15/2017 10:10 AM     Author:  Parth Graham MD Service:  Gastroenterology Author Type:  Physician    Filed:  6/15/2017  5:04 PM Date of Service:  6/15/2017 10:10 AM Creation Time:  6/15/2017  4:59 PM    Status:  Signed :  Parth Graham MD (Physician)         [facility]  Gastroenterology Progress Note     Kavitha Headley MRN# [id number]   YOB: 1945 Age: 71 year old          Assessment and Plan:     Cellulitis   Clinically the same. Feeling little mor energy. C/O nausea last night.  W/U from Chronic liver disease fairly unremarkable. Finding consitent with Chronic ETOH liver disease with acute ETOH/sepsis/drug induced liver injury.  Repeat labs tomorrow including CBC, CMP,PT,INR.  Will continue albumin till tomorrow.  Appreciate greatly Psych input and help.  Will continue to follow along. Thrombocytopenia likely from portal HTN.                  Cellulitis of right lower extremity  Hypokalemia  Elevated LFTs  Thrombocytopenia (H)      Interval History:   no new complaints, denies chest pain, denies shortness of breath, alert, oriented to person, place and time and has had a bowel movement in the last 24 hours              Review of Systems:   C: NEGATIVE for fever, chills, change in weight  E/M: NEGATIVE for ear, mouth and throat problems  R: NEGATIVE for significant cough or SOB  CV: NEGATIVE for chest pain, palpitations or peripheral edema              Medications:   I have reviewed this patient's current medications    mirtazapine  15 mg Oral At Bedtime     QUEtiapine  25 mg Oral At Bedtime     lactulose  10 g Oral BID     sucralfate  1 g Oral 4x Daily AC & HS     ranitidine  150 mg Oral BID     magnesium oxide  400 mg Oral Daily     vitamin  B-1  100 mg Oral Daily     vortioxetine  5 mg Oral Daily     metoprolol  12.5 mg Oral Daily     gabapentin (NEURONTIN) tablet 600 mg  600 mg Oral At Bedtime     gabapentin (NEURONTIN) capsule 300 mg  300 mg Oral Daily     multivitamin, therapeutic  1 tablet Oral Daily     sodium chloride (PF)  3 mL Intracatheter Q8H     folic acid  1 mg Oral Daily                  Physical Exam:   Vitals were reviewed  Vital Signs with Ranges  Temp:  [97.5  F (36.4  C)-98.9  F (37.2  C)] 98.9  F (37.2  C)  Heart Rate:  [] 103  Resp:  [16-18] 18  BP: (107-137)/(73-85) 137/82  SpO2:  [94 %-99 %] 94 %  I/O last 3 completed shifts:  In: -   Out: 3150 [Urine:3150]  Cardiovascular: negative, PMI normal. No lifts, heaves, or thrills. RRR. No murmurs, clicks gallops or rub  Respiratory: negative, Percussion normal. Good diaphragmatic excursion. Lungs clear  Head: Normocephalic. No masses, lesions, tenderness or abnormalities  Neck: Neck supple. No adenopathy. Thyroid symmetric, normal size,, Carotids without bruits.  Abdomen: Abdomen soft, non-tender. BS normal. No masses, organomegaly  SKIN: no suspicious lesions or rashes           Data:   I reviewed the patient's new clinical lab test results.   Recent Labs   Lab Test  06/14/17   0812  06/13/17   0800  06/12/17   0730  06/11/17   1540   06/10/17   0823   06/07/17   0720   WBC   --   7.2  5.9   --    --   7.2   < >  6.2   HGB   --   11.7  11.3*   --    --   11.3*   < >  11.3*   MCV   --   103*  100   --    --   100   < >  99   PLT  61*  64*  51*   --    < >  77*   < >  166   INR   --    --    --   1.15*   --   1.19*   --   1.02    < > = values in this interval not displayed.     Recent Labs    Lab Test  06/14/17   0812  06/13/17   0800  06/12/17   0730   POTASSIUM  4.2  4.7  4.5   CHLORIDE  97  96  98   CO2  33*  32  32   BUN  10  13  12   ANIONGAP  4  4  3     Recent Labs   Lab Test  06/14/17   0812  06/13/17   1350  06/13/17   0800  06/12/17   0730   06/09/17   1335   06/06/17   2300   11/06/10   1415   ALBUMIN  2.5*   --   2.4*  2.1*   < >   --    < >   --    < >  3.7   BILITOTAL  8.1*   --   8.8*  5.2*   < >   --    < >   --    < >  0.5   ALT  25   --   22  18   < >   --    < >   --    < >  35   AST  65*   --   71*  54*   < >   --    < >   --    < >  60*   PROTEIN   --   30*   --    --    --   10*   --   Negative   < >   --    LIPASE   --    --    --    --    --    --    --    --    --   139    < > = values in this interval not displayed.       I reviewed the patient's new imaging results.    All laboratory data reviewed  All imaging studies reviewed by me.    Parth Graham MD,  6/15/2017  [facility]  Office : [phone]  Cell: [phone][AB1.1]     Revision History        User Key Date/Time User Provider Type Action    > AB1.1 6/15/2017  5:04 PM Parth Graham MD Physician Sign            Progress Notes by Sara Nelson LSW at 6/15/2017  4:16 PM     Author:  Sara Nelson LSW Service:  Social Work Author Type:      Filed:  6/15/2017  4:19 PM Date of Service:  6/15/2017  4:16 PM Creation Time:  6/15/2017  4:16 PM    Status:  Signed :  Sara Nelson LSW ()         D: RACHAEL following for DC planning.   I: [facility] declined pt. [facility] still has a bed. SW updated pt. Discussed possible transfer directly to [facility] for CD treatment per MD/psych suggestion. In order to be considered by [facility] a patient must have a CD assessment or Rule 25 done first. Since pt is Medicare, she can only go to [facility] for this assessment. She will need to make an  appointment to follow up with this as an out-pt. SW explained this to pt. She expressed an understanding. She is agreeable to [facility] for TCU first.   P: RACHAEL will follow.     NICOLE Spence  [id number][JJ1.1]       Revision History        User Key Date/Time User Provider Type Action    > JJ1.1 6/15/2017  4:19 PM Sara Nelson LSW  Sign            Progress Notes by Nan Macias DO at 6/15/2017 10:55 AM     Author:  Nan Macias DO Service:  Hospitalist Author Type:  Physician    Filed:  6/15/2017 12:04 PM Date of Service:  6/15/2017 10:55 AM Creation Time:  6/15/2017 10:55 AM    Status:  Signed :  Nan Macias DO (Physician)         [facility]    Hospitalist Progress Note    Assessment & Plan   Kavitha Headley is a 71 year old female with PMHx of hypertension, dyslipidemia, moderate aortic stenosis, depression, anxiety, EtOH use, chronic back pain, recent LE cellulitis and oral antibiotics who was admitted on 6/6/2017 with worsening LE erythema and swelling. Additionally, she was found to have abnl LFTs.    Abnl LFTs:  LFTs elevated on admission.   Suspected secondary to acute alcoholic hepatitis, possibly underlying liver disease or early cirrhosis though underwent additional workup given cholestatic picture (raising concerns for med related vs autoimmune vs obstructive)  US this stay showed no acute findings other than some fatty infiltration. No nodularities.   Viral hepatitis panel neg, antimitochondiral Ab neg, CMV neg, EBV IgG positive, HSV pending, iron and ferritin nl  Statin held on admission, was initially placed on Ancef as below, though that was subsequently dc'd  GI consulted -- seen by Dr. Graham  Had EGD and EUS on 6/12 which showed a friable distal esophagus which was biopsied; otherwise had nl appearing intrahepatic biliary ducts, CBD not well visualized, no obvious pancreatic mass  Ammonia has been at the  high range of nl -- started on lactulose on 6/13  CT abd/pelvis obtained on 6/14 and showed diffuse fatty infiltration throughout the liver, no biliary duct dilation, no visible pancreatic abnl and some nonspecific inguinal lymphadenopathy[KW1.1]    -- AST remains mildly elevated but stable, alk phos stable, t.biliruin (predominantly direct bili) trending up- unclear why given lack of findings on serial imaging studies[KW1.2]  -- should remain off possibly offending medications  -- repeat CMP in AM  -- cont lactulose (no need to repeat ammonia level)    Bilateral LE Cellulitis vs Worsening Chronic Venous Stasis:  Recently treated for cellulitis prior to admission with clindamycin, then switched to minocycline and Keflex dt reported intolerance. On admission, patient endorsed worsening LE erythema and swelling. Was afebrile, CBC nl. Bilateral US neg for DVT.   IV Vancomycin was started on admission -- seen by ID and was changed to Ancef on 6/7, this was ultimately stopped on 6/9, was then transitioned to clindamycin (ID recommended Keflex but this was not used given concerns for exacerbating LFTs)    -- completed course of antibiotic therapy on 6/13  -- mgmt of LE edema as below    Chronic Bilateral LE Edema:  PTA: Lasix 20mg po daily.   Edema likely dt underlying liver disease and low albumin. No evidence of pulmonary edema.   Echo this stay showed intact EF (>70%), though with impaired LV relaxation, moderate AS and a mildly dilated ascending aorta  Was given dose of IV Lasix on admission -- subsequent doses held given development of LYLY and hyponatremia  Was transiently given IVFs -- these were stopped on 6/11  Resumed on Lasix with albumin on 6/13 and was given 3 doses, renal function tolerating thus far, breathing improved    -- cont elevation of LEs, low Na diet, nutritional supplements    Acute Kidney Injury:  Cr reportedly normal prior to admission. Peaked at 1.4 this stay after initial diuresis  Seen by  nephrology this stay -- felt she was intravascularly volume depleted and was started on NS.   Cr improved on NS but edema worsening, IVFs dc'd 6/11.    -- monitoring renal function with resumption of diuretics and albumin (given total of 3 doses from 6/13 - 6/14)    Thrombocytopenia:  Etiology not completely clear -- thought to be possibly secondary to underlying liver disease  Platelet count dropped as low as 51, has since started to improve  Lovenox dc'd, HIT Ab panel neg  Had associated elevated d-dimer, low fibrinogen and mildly elevated INR -- clinical picture concerning for possible mild DIC, discussed w/Dr. Pa, recommended BM biopsy if platelet count conts to drop    -- suspect thrombocytopenia likely multifactorial -- dt liver disease, antibiotics, EtOH, low grade DIC  -- monitor daily CBC, transfuse platelets if <20 (or <50 if needing procedure)    EtOH Abuse:  Reported drinking 2-3 cocktails/night. Denied prior hx of withdrawal. Workup this stay suggestive of chronic EtOH use.   Was notably tremulous and unsteady on 6/8 and started on CIWA protocol -- no overt signs of withdrawal so this was dc'd.     -- psych following this stay -- recommended hospital-based inpatient stay for ongoing care, best option may be [facility]  -- cont thiamine/folate/MVI    Depression / Anxiety:  Psych following this stay.   Started on Trintellex and Seroquel, Seroquel stopped dt concern it may be contributing to elevated LFTs    -- medications have been adjusted -- per psych assessment on 6/15, conts on Trintellix and Seroquel 25mg HS plus 15mg TID prn; stopped trazodone and Restoril, Remeron dose decreased to 15mg HS[KW1.1]  -- minimize use of prn Seroquel as able so as not to exacerbate abnl LFTs[KW1.2]    Moderate Aortic Stenosis:  Noted on echo this stay.   Should follow up with cardiology after discharge    Hypertension:  Chronic and stable on Metoprolol    Dyslipidemia:  Holding statin this stay as shaylee given abnl  LFTs    Insomnia:[KW1.1]  Has prns[KW1.2]    Chronic Back Pain:  On narcotic agreement.  Remains on gabapentin (300mg daily, 600mg HS) and prn Tramadol (though dose decreased to 25mg TID prn)    FEN: no IVFs, lytes stable, regular diet  DVT Prophylaxis: PCDs only, Lovenox stopped given thrombocytopenia  Code Status: Full Code    Disposition: Repeat labs in AM. Discharge pending clinical stability -- ?1-2 more days. Will have [facility] inpatient stay at [facility] is an option (given her medical complexity), otherwise dc to TCU to regain strength, then ultimately [facility].    Nan Macias    Interval History   Complained of nausea and tremors overnight -- started on prn Reglan[KW1.1].   Seen this morning. Feeling okay -- appetite marginal with intermittent nausea but has been trying to drink Ensure shakes. Also able to tolerate some yogurt earlier this morning. LE edema improved. Says BMs have been hard.[KW1.2]    -Data reviewed today: I reviewed all new labs and imaging results over the last 24 hours. I personally reviewed no images or EKG's today.    Physical Exam   Temp: 98.9  F (37.2  C) Temp src: Oral BP: 107/73   Heart Rate: 102 Resp: 16 SpO2: 96 % O2 Device: None (Room air)    Vitals:    06/13/17 0626 06/14/17 0649 06/15/17 0702   Weight: 89.7 kg (197 lb 12 oz) 90.5 kg (199 lb 8.3 oz) 88.9 kg (195 lb 15.8 oz)     Vital Signs with Ranges  Temp:  [97.5  F (36.4  C)-98.9  F (37.2  C)] 98.9  F (37.2  C)  Heart Rate:  [] 102  Resp:  [16-18] 16  BP: (107-134)/(73-85) 107/73  SpO2:  [95 %-99 %] 96 %  I/O last 3 completed shifts:  In: 670 [P.O.:670]  Out: 2850 [Urine:2850]    Constitutional: Resting comfortably,[KW1.1] alert and answering questions appropriately, NAD[KW1.2]  Respiratory:[KW1.1] CTAB, no wheeze/rales/rhonchi[KW1.2]  Cardiovascular:[KW1.1] HRRR w/+SM[KW1.2]  GI:[KW1.1] S, NT, ND, +BS[KW1.2]  Skin/Integumen:[KW1.1] +jaundiced, scattered ecchymosis on  extremities[KW1.2]  Other:[KW1.1] +bilateral LE pitting edema to the knees[KW1.2]    Medications        mirtazapine  15 mg Oral At Bedtime     QUEtiapine  25 mg Oral At Bedtime     lactulose  10 g Oral BID     sucralfate  1 g Oral 4x Daily AC & HS     ranitidine  150 mg Oral BID     magnesium oxide  400 mg Oral Daily     vitamin  B-1  100 mg Oral Daily     vortioxetine  5 mg Oral Daily     metoprolol  12.5 mg Oral Daily     gabapentin (NEURONTIN) tablet 600 mg  600 mg Oral At Bedtime     gabapentin (NEURONTIN) capsule 300 mg  300 mg Oral Daily     multivitamin, therapeutic  1 tablet Oral Daily     sodium chloride (PF)  3 mL Intracatheter Q8H     folic acid  1 mg Oral Daily       Data     Recent Labs  Lab 06/15/17  0822 06/14/17  0812 06/13/17  1445 06/13/17  0800 06/12/17  0730 06/11/17  1540  06/10/17  0823   WBC  --   --   --  7.2 5.9  --   --  7.2   HGB  --   --   --  11.7 11.3*  --   --  11.3*   MCV  --   --   --  103* 100  --   --  100   PLT  --  61*  --  64* 51*  --   < > 77*   INR  --   --   --   --   --  1.15*  --  1.19*   NA  --  134 131* 132* 133  --   < > 131*   POTASSIUM  --  4.2  --  4.7 4.5  --   < > 3.8   CHLORIDE  --  97  --  96 98  --   < > 92*   CO2  --  33*  --  32 32  --   < > 31   BUN  --  10  --  13 12  --   < > 20   CR 0.87 0.96 1.00 1.05* 0.92  --   < > 1.04   ANIONGAP  --  4  --  4 3  --   < > 8   BIRGIT  --  8.2*  --  8.4* 7.7*  --   < > 7.8*   GLC  --  74  --  88 75  --   < > 75   ALBUMIN  --  2.5*  --  2.4* 2.1*  --   < > 2.0*   PROTTOTAL  --  5.3*  --  5.7* 5.0*  --   < > 5.0*   BILITOTAL  --  8.1*  --  8.8* 5.2*  --   < > 4.2*   ALKPHOS  --  213*  --  262* 227*  --   < > 215*   ALT  --  25  --  22 18  --   < > 20   AST  --  65*  --  71* 54*  --   < > 66*   < > = values in this interval not displayed.    Recent Results (from the past 24 hour(s))   CT Abdomen Pelvis w Contrast    Narrative    CT ABDOMEN AND PELVIS WITH CONTRAST 6/14/2017 4:16 PM    HISTORY: 71-year-old patient with  obstructive jaundice and  thrombocytopenia. Request made for evaluation of hepatobiliary tree,  pancreas, and splenomegaly.    COMPARISON: April 10, 2012.    TECHNIQUE: Axial and coronal CT images obtained from the lung bases  through the abdomen and pelvis after the uneventful administration of  Isovue-370 intravenous contrast given for a total of 50 mL. Radiation  dose for this scan was reduced using automated exposure control,  adjustment of the mA and/or kV according to patient size, or iterative  reconstruction technique.    FINDINGS: Bilateral breast implants are again identified, partially  visible. Lung bases are unremarkable. Heart size is normal without  pericardial effusion. Right total hip arthroplasty. Intervertebral  disc space narrowing throughout the lumbar spine. No acute osseous  abnormality.    Diffuse fatty infiltration throughout the liver, not readily  identified on previous exam. Cholecystectomy clips. No evidence of  intrahepatic or extrahepatic biliary dilatation. No pancreatic ductal  dilatation. Postsurgical changes with gastrojejunostomy. Spleen is  normal in size measuring up to 9.4 cm. The adrenal glands and kidneys  appear normal.    Cortical thinning of the left kidney, compared to the right, though no  hydronephrosis. Bladder is partially distended and unremarkable.  Status post hysterectomy. No dilated loops of bowel. Scattered  bilateral inguinal lymph nodes incidentally identified, the largest of  which measures up to 2.6 x 1.4 cm, though appears to have a fatty  hilum. Patient did have variable degree of lymph nodes in similar  location on previous exam, suspect minimally larger on today's exam.      Impression    IMPRESSION:  1. Diffuse fatty infiltration throughout the liver. No evidence of  intrahepatic or extrahepatic biliary dilatation. No visible pancreatic  abnormality. Spleen is normal in size.  2. Cortical thinning of the left kidney, relative to the right,  though  unchanged compared to previous exam.  3. Enlarged inguinal lymph nodes, though some of the larger lymph  nodes have fatty leilani. Lymph nodes may be minimally increased in size  compared to previous exam. They may be reactive, though nonspecific.    ZEYNEP CASTRO MD[KW1.1]          Revision History        User Key Date/Time User Provider Type Action    > KW1.2 6/15/2017 12:04 PM Nan Macias DO Physician Sign     KW1.1 6/15/2017 10:55 AM Nan Macias DO Physician             Progress Notes by Elida Hicks MD at 6/15/2017  2:57 AM     Author:  Elida Hicks MD Service:  Hospitalist Author Type:  Physician    Filed:  6/15/2017  2:58 AM Date of Service:  6/15/2017  2:57 AM Creation Time:  6/15/2017  2:57 AM    Status:  Signed :  Elida Hicks MD (Physician)         HOSPITALIST Cross Cover    Called by RN about tremors, nausea. Chart reviewed.     Patient requesting reglan.    Will start reglan at 5 mg IV TID PRN.    Will also add prn ativan for nausea, tremors.    ELIDA HICKS MD[PG1.1]     Revision History        User Key Date/Time User Provider Type Action    > PG1.1 6/15/2017  2:58 AM Elida Hicks MD Physician Sign            Progress Notes by Nahum Pa MD at 6/14/2017  2:14 PM     Author:  Nahum Pa MD Service:  Hem/Onc Author Type:  Physician    Filed:  6/14/2017  2:29 PM Date of Service:  6/14/2017  2:14 PM Creation Time:  6/14/2017  2:14 PM    Status:  Signed :  Nahum Pa MD (Physician)         Ms. Kavitha Headley is a 71-year-old female who has been admitted to the hospital with bilateral lower extremity cellulitis and abnormal liver function tests.  The patient has multiple medical problems, including hypertension, dyslipidemia and alcohol abuse.     Hematology is following for acute thrombocytopenia.  HIT has been ruled out.  Thrombocytopenia is multifactorial.  It is due to combination of antibiotics, liver disease, alcohol  use and low-grade DIC.    Patient overall is stable.  She is tired.  No bleeding from ear, nose or throat.  No blood in the urine or stool.  No fever.  No chills.    Labs: Reviewed.    Assessment and plan:  1.  Acute thrombocytopenia.  Thrombocytopenia in the stable.  No bleeding.  Thrombocytopenia is mainly due to combination of decompensated liver disease and antibiotics for cellulitis.  -Monitor CBC.  Transfuse platelets if bleeding or platelet below 20.  -We will consider bone marrow biopsy if there is worsening of thrombocytopenia.    2.  Alcohol abuse.  -Patient advised not to resume alcohol after discharge.  She is thinking of getting inpatient treatment for it.    3.  Elevated liver function test secondary to liver cirrhosis and alcohol abuse.  GI following.  CT has been ordered.[BK1.1]     Revision History        User Key Date/Time User Provider Type Action    > BK1.1 6/14/2017  2:29 PM Nahum Pa MD Physician Sign            Progress Notes by Lina Caldera MD at 6/14/2017  9:51 AM     Author:  Lina Caldera MD Service:  Hospitalist Author Type:  Physician    Filed:  6/14/2017 10:18 AM Date of Service:  6/14/2017  9:51 AM Creation Time:  6/14/2017  9:51 AM    Status:  Signed :  Lina Caldera MD (Physician)         [facility]    Internal Medicine Hospitalist Progress Note  06/14/2017  I evaluated patient on the above date.    Assessment & Plan    Ms. Kavitha Hartman is a pleasant 71-year-old female with past medical history including HTN, DLD, mod AS, dep/anx, EtOH abuse, chronic back pain, recent LE cellulitis ond oral antibiotics, who presented 6/6 with worsening LE erythema and swelling.    Abnormal liver function tests, suspect from acute EtOH hepatitis, possible chronic liver disease/early cirrhosis, LFTs elevated on admission, statin held on admission. Abd US 6/7 showed no acute findings and no nodular architecture but has fattly liver infiltration.  - Overall,  suspect related to her daily alcohol drinking, but given cholestatic picture, concern if due to meds, vs autoimmune vs obstructive.    - Has been on cefazolin, which could affect LFT's.  - Hepatitis panel negative, TONG and anti mitochondrial Ab negative, CMV neg, EBV capsid IgG positive, HSV pending, off cefazolin as above.  - Abstinence from EtOH recommended.  - LFTs improved initially and bili up and remains elevated.  - discussed with Dr Graham today 6/14, s/p EGD and EUS 6/12- friable distal esophagus-biopsied- pending, and normal IHBD, CBD not well visualized. No obvious pancreatic mass.    - Plan is CT abdomen and pelvis today to eval liver/biliary tree, and spleen as well.  - Ammonia high normal (from 10-45), lactulose started 6/13     BLE cellulitis vs worsening chronic venous stasis:  Recently treated for cellulitis with abx including clindamycin, then switched to minocycline and Keflex (due to intolerance).  - On initial eval 6/6, pt afebrile, nl WBC. BLE US 6/6 negative. Started on IV vanco 6/6. Given a dose of IV Lasix 6/6.  - Improved 6/7. Procal 1.99 6/7. Seen by ID and abx changed to IV cefazolin 6/7 which was stopped 6/9  - Started clindamycin (Keflex recommended by ID, but given possible affect on LFT's /possible side effect of cholestatic jaundice, favored another option by ID, appreciate ID assistance), completed course of clindamycin 6/13  - Keep BLE's elevated.  - Monitor cultures, NGTD  - Management of LE edema as below.    Worsened chronic LE edema: PTA on PRN Lasix 20 mg daily. No signs of pulm edema or other signs of CHF. Likely due to underlying liver disease and low albumin.  * Given IV Lasix 6/6 as above.  * Leg swelling improved 6/7. Echo 6/7 showed LVEF >70%, suggestion of impaired LV relaxation, mod AS, ascending aorta is mildly dilated.  - Given worsened Cr and hyponatremia, Lasix held since then.  - Continue leg elevation.  - Edema worsened with IVF but IVF stopped 6/11. Stable.  -  nutrition consult, nutritional supplement. Needs follow up.  - encourage IS as mildly hypoxic at sleep, now better and of O2 today  - Given slightly worsened Cr, and hyponatremia, concern if developing HRS, started on albumin and lasix- through today. Negative 1L in last 24 hours, dyspnea better, Cr also stable.      Acute kidney injury, stage 1  - Cr normal on admission, baseline 0.8-1, but increased upto 1.4  - Felt to be prerenal/intravascular vol depletion, Nephrology consulted, lasix held and was maintained on NS, and Cr improved, but leg edema has worsened  - Off IVF since 6/11.  -albumin and lasix through today  - Monitor BMP, as going for CT with contrast  - Avoid nephrotoxic medications as able     Hyponatremia, possibly nutrional or related to volume overload, ?due to liver disease.  - Na 129 on admission 6/6. Given Lasix 6/6.  - Hyponatremia workup labs not suggestive of SIADH, no overt CHF/volume overload  - Improved but down trended when IVF stopped, now on albumin and lasix, Sodium normal today at 134  - appreciate nephrology assistance, has signed off.     Thrombocytopenia.  * Unclear etiology; possibly from liver disease- though normal at presentation, medication effect or from other cause including low grade DIC vs Vancomyin  - Platelet counts dropped and now stable in 60s, 166-->143-->118-->77-->65-->51-->64-->61  - Lovenox discontinued.  - Consider platelet transfusion if needs a procedure and less than 50,000; or if less than 10,000.  - HIT panel negative  - Has elevated dimer, low fibrinogen, mildly elevated INR, concern for low grade DIC vs due to liver disease, discussed with Dr Pa, if continues to drop then may need BM biopsy.     EtOH abuse: She reported drinking 2-3 cocktails per night. She denied having history of alcohol withdrawal in the past.    - Her blood work here suggests chronic alcoholic use with hyponatremia, hypokalemia, and elevated liver function tests; also fatty liver on  US. INR normal.  - More tremulous 6/8, started on CIWA 6/8.  - Tremulous and unsteady and feels anxious often but no paras withdrawal, likely due to anxiety and sequela of long term alcohol use  - DC CIWA as has been several days since last drink  - Abstinence from EtOH recommended.     Hypokalemia, could be due nutritional or related to PRN Lasix at home.  - on K replacement protocol.     Moderate aortic stenosis.  * Her last echocardiogram was in 08/2015.  Follows with [facility].  * Echo 6/7 showed LVEF >70%, suggestion of impaired LV relaxation, mod AS, ascending aorta is mildly dilated.  - Monitor i/o's, daily wts.  - F/u Cardiologist for further monitoring.     Hypertension (benign essential).  [PTA: metoprolol XL 25 mg daily.]  - Continue metoprolol XL.     Dyslipidemia.  - Holding atorvastatin as above.     Depression/anxiety.   * Psychiatry consulted and started Trintellix and Seroquel 6/9.  - Seroquel held due to rising LFT's and continued on Remeron and trazodone.  - will ask psychiatry to re eval       Insomnia.  - Continue PRN temazepam.     Chronic back pain.  * On narcotic agreement.  - Continue gabapentin, PTA PRN Ultram 50 mg BID.     Prophylaxis.  - PCD's. lovenox stopped due to thrombocytopenia  - PPI discontinued given thrombocytopenia, ranitidine.    CODE status: Full    Unable to reach daughter for update despite calling several times in the last 2 days and today as well, per patient she has been travelling and not reachable currently. Discussed with RN.    Disposition: based on CT findings today, follow up LFT/platelet, and when ok with GI/onc, possibly 2 days or so.    Interval History      Feels anxious, remains tremulous.  Denies dyspnea, cough chest pain.   Had small BM yesterday, no abd pain or nausea.  Feels tired.    -Data reviewed today: I reviewed all new labs over the last 24 hours. I personally reviewed no images or EKG's today.    Physical Exam   Heart Rate: 76, Blood pressure  "122/80, pulse 97, temperature 97.8  F (36.6  C), temperature source Oral, resp. rate 18, height 1.626 m (5' 4\"), weight 90.5 kg (199 lb 8.3 oz), SpO2 96 %, not currently breastfeeding.  Vitals:    06/12/17 0618 06/13/17 0626 06/14/17 0649   Weight: 88.5 kg (195 lb) 89.7 kg (197 lb 12 oz) 90.5 kg (199 lb 8.3 oz)     Vital Signs with Ranges  Temp:  [97.8  F (36.6  C)-98.5  F (36.9  C)] 97.8  F (36.6  C)  Heart Rate:  [75-95] 76  Resp:  [18] 18  BP: (109-127)/(67-80) 122/80  SpO2:  [94 %-96 %] 96 %  Patient Vitals for the past 24 hrs:   BP Temp Temp src Heart Rate Resp SpO2 Weight   06/14/17 0856 122/80 - - 76 - - -   06/14/17 0742 112/78 97.8  F (36.6  C) Oral 75 18 96 % -   06/14/17 0649 - - - - - - 90.5 kg (199 lb 8.3 oz)   06/14/17 0415 127/76 98.5  F (36.9  C) Oral 95 18 95 % -   06/13/17 1533 109/67 97.8  F (36.6  C) Oral 82 18 94 % -     I/O's Last 24 hours  I/O last 3 completed shifts:  In: 420 [P.O.:420]  Out: 1550 [Urine:1550]    Constitutional: Jaundiced. Alert, oriented, pleasant. Mildly tremulous.  HEENT: PERRLA, EOMI, conjunctival icterus noted. Face is less puffy.  Respiratory: Clear but dim, no crackles/wheezes. Normal work of breathing. Remains on room air.  Cardiovascular: RRR, 2/6 systolic murmur, no tachycardia  GI: Soft, mild suprapubic tenderness resolved, BS (+). Areas of ecchymosis over the abd wall stable  Skin/Integumen: BLE edema noted slightly better today, no redness noted. Bruises over arms.  Neuro: AAOx3, follows verbal commands appropriately. CN 2-12 intact.       Data     Recent Labs  Lab 06/14/17  0812 06/13/17  1445 06/13/17  0800 06/12/17  0730 06/11/17  1540  06/10/17  0823   WBC  --   --  7.2 5.9  --   --  7.2   HGB  --   --  11.7 11.3*  --   --  11.3*   MCV  --   --  103* 100  --   --  100   PLT 61*  --  64* 51*  --   < > 77*   INR  --   --   --   --  1.15*  --  1.19*    131* 132* 133  --   < > 131*   POTASSIUM 4.2  --  4.7 4.5  --   < > 3.8   CHLORIDE 97  --  96 98  --   < " > 92*   CO2 33*  --  32 32  --   < > 31   BUN 10  --  13 12  --   < > 20   CR 0.96 1.00 1.05* 0.92  --   < > 1.04   ANIONGAP 4  --  4 3  --   < > 8   BIRGIT 8.2*  --  8.4* 7.7*  --   < > 7.8*   GLC 74  --  88 75  --   < > 75   ALBUMIN 2.5*  --  2.4* 2.1*  --   < > 2.0*   PROTTOTAL 5.3*  --  5.7* 5.0*  --   < > 5.0*   BILITOTAL 8.1*  --  8.8* 5.2*  --   < > 4.2*   ALKPHOS 213*  --  262* 227*  --   < > 215*   ALT 25  --  22 18  --   < > 20   AST 65*  --  71* 54*  --   < > 66*   < > = values in this interval not displayed.  Recent Labs   Lab Test  06/14/17   0812  06/13/17   0800  06/12/17   0730  06/11/17   0910  06/10/17   0823   04/11/12   1214  04/11/12   0649   GLC  74  88  75  75  75   < >   --    --    BGM   --    --    --    --    --    --   134*  112*    < > = values in this interval not displayed.       No results found for this or any previous visit (from the past 24 hour(s)).    Medications   All medications were reviewed.       lactulose  10 g Oral BID     albumin human  25 g Intravenous BID     furosemide  20 mg Intravenous BID     sucralfate  1 g Oral 4x Daily AC & HS     ranitidine  150 mg Oral BID     magnesium oxide  400 mg Oral Daily     vitamin  B-1  100 mg Oral Daily     vortioxetine  5 mg Oral Daily     metoprolol  12.5 mg Oral Daily     gabapentin (NEURONTIN) tablet 600 mg  600 mg Oral At Bedtime     gabapentin (NEURONTIN) capsule 300 mg  300 mg Oral Daily     mirtazapine  45 mg Oral At Bedtime     multivitamin, therapeutic  1 tablet Oral Daily     traZODone  100 mg Oral At Bedtime     sodium chloride (PF)  3 mL Intracatheter Q8H     folic acid  1 mg Oral Daily[TK1.1]        Revision History        User Key Date/Time User Provider Type Action    > TK1.1 6/14/2017 10:18 AM Lina Caldera MD Physician Sign            Progress Notes by Marily Perez at 6/14/2017  8:06 AM     Author:  Marily Perez Service:  Spiritual Health Author Type:      Filed:  6/14/2017  8:07 AM Date of Service:   6/14/2017  8:06 AM Creation Time:  6/14/2017  8:06 AM    Status:  Signed :  Marily Perez ()         [facility]  Spiritual Assessment Progress Note  [facility] 64    Stopped in to introduce [facility] per length of stay visit. Had a short but pleasant conversation about how her stay keeps getting lengthened and how she hopes they can find what is truly wrong so she can go home. Daughter Nery was in the room and the three of us said a prayer. Pt will contact if there are future spiritual needs.      Marily Trevino Intern  Pager [phone][SM1.1]     Revision History        User Key Date/Time User Provider Type Action    > SM1.1 6/14/2017  8:07 AM Marily Perez Sign            Progress Notes by Parth Graham MD at 6/13/2017  3:30 PM     Author:  Parth Graham MD Service:  Gastroenterology Author Type:  Physician    Filed:  6/13/2017  7:48 PM Date of Service:  6/13/2017  3:30 PM Creation Time:  6/13/2017  7:35 PM    Status:  Signed :  Parth Graham MD (Physician)         [facility]  Gastroenterology Progress Note     Kavitha Headley MRN# [id number]   YOB: 1945 Age: 71 year old          Assessment and Plan:     Cellulitis    ETOH hepatitis and likely cirrhosis. Patient had EGD and EUS done yesterday. Patient appears awake ans skaky till. No astrixis. Patient had increase in LFTs today.   I will recommend to check EBV. CMV PCR and HSV studies are pending. Platelet are stable. 64K today. Will get MRCP tomorrow.  Continue on supportive care.   Continue on albumin.              Cellulitis of right lower extremity  Hypokalemia  Elevated LFTs  Thrombocytopenia (H)      Interval History:   doing well, denies chest pain, pain is controlled, alert, oriented to person, place and time and has had a bowel movement in the last 24 hours              Review of Systems:   C: NEGATIVE for fever, chills, change in weight  E/M: NEGATIVE for  ear, mouth and throat problems  R: NEGATIVE for significant cough or SOB  CV: NEGATIVE for chest pain, palpitations or peripheral edema             Medications:   I have reviewed this patient's current medications[AB1.1]    lactulose  10 g Oral BID     albumin human  25 g Intravenous BID     furosemide  20 mg Intravenous BID     sucralfate  1 g Oral 4x Daily AC & HS     ranitidine  150 mg Oral BID     magnesium oxide  400 mg Oral Daily     vitamin  B-1  100 mg Oral Daily     vortioxetine  5 mg Oral Daily     clindamycin  300 mg Oral Q8H AMOR     metoprolol  12.5 mg Oral Daily     gabapentin (NEURONTIN) tablet 600 mg  600 mg Oral At Bedtime     gabapentin (NEURONTIN) capsule 300 mg  300 mg Oral Daily     mirtazapine  45 mg Oral At Bedtime     multivitamin, therapeutic  1 tablet Oral Daily     traZODone  100 mg Oral At Bedtime     sodium chloride (PF)  3 mL Intracatheter Q8H     folic acid  1 mg Oral Daily[AB1.2]                  Physical Exam:   Vitals were reviewed  Vital Signs with Ranges[AB1.1]  Temp:  [97.4  F (36.3  C)-97.8  F (36.6  C)] 97.8  F (36.6  C)  Pulse:  [97] 97  Heart Rate:  [73-82] 82  Resp:  [18] 18  BP: (102-116)/(53-78) 109/67  SpO2:  [89 %-99 %] 94 %  I/O last 3 completed shifts:  In: 240 [P.O.:240]  Out: 150 [Urine:150][AB1.2]  Constitutional: healthy, alert and no distress   Cardiovascular: negative, PMI normal. No lifts, heaves, or thrills. RRR. No murmurs, clicks gallops or rub  Respiratory: negative, Percussion normal. Good diaphragmatic excursion. Lungs clear  Head: Normocephalic. No masses, lesions, tenderness or abnormalities  Neck: Neck supple. No adenopathy. Thyroid symmetric, normal size,, Carotids without bruits.  Abdomen: Abdomen soft, non-tender. BS normal. No masses, organomegaly  SKIN: no suspicious lesions or rashes           Data:   I reviewed the patient's new clinical lab test results.[AB1.1]   Recent Labs   Lab Test  06/13/17   0800  06/12/17   0730  06/11/17   1540   06/11/17   0910  06/10/17   0823   06/07/17   0720   WBC  7.2  5.9   --    --   7.2   < >  6.2   HGB  11.7  11.3*   --    --   11.3*   < >  11.3*   MCV  103*  100   --    --   100   < >  99   PLT  64*  51*   --   65*  77*   < >  166   INR   --    --   1.15*   --   1.19*   --   1.02    < > = values in this interval not displayed.     Recent Labs   Lab Test  06/13/17   0800  06/12/17   0730  06/11/17   0910   POTASSIUM  4.7  4.5  4.4   CHLORIDE  96  98  95   CO2  32  32  32   BUN  13  12  15   ANIONGAP  4  3  7     Recent Labs   Lab Test  06/13/17   1350  06/13/17   0800  06/12/17   0730  06/11/17   0910   06/09/17   1335   06/06/17   2300   11/06/10   1415   ALBUMIN   --   2.4*  2.1*  2.3*   < >   --    < >   --    < >  3.7   BILITOTAL   --   8.8*  5.2*  5.1*   < >   --    < >   --    < >  0.5   ALT   --   22  18  20   < >   --    < >   --    < >  35   AST   --   71*  54*  68*   < >   --    < >   --    < >  60*   PROTEIN  30*   --    --    --    --   10*   --   Negative   < >   --    LIPASE   --    --    --    --    --    --    --    --    --   139    < > = values in this interval not displayed.[AB1.2]       I reviewed the patient's new imaging results.    All laboratory data reviewed  All imaging studies reviewed by me.[AB1.1]    Parth Graham MD[AB1.2],  6/13/2017  [facility]  Office : [phone]  Cell: [phone][AB1.1]     Revision History        User Key Date/Time User Provider Type Action    > AB1.2 6/13/2017  7:48 PM Parth Graham MD Physician Sign     AB1.1 6/13/2017  7:35 PM Parth Graham MD Physician             Progress Notes by Nahum Pa MD at 6/13/2017  4:02 PM     Author:  Nahum Pa MD Service:  Hem/Onc Author Type:  Physician    Filed:  6/13/2017  4:57 PM Date of Service:  6/13/2017  4:02 PM Creation Time:  6/13/2017  4:02 PM    Status:  Signed :  Nahum Pa MD (Physician)         Consult dictated.    71-year-old female with  acute thrombocytopenia.  Thrombocytopenia is multifactorial.  It is due to medications (antibiotics), cellulitis/infection, alcohol abuse and low-grade DIC.    Plan:  -We will get some labs including peripheral blood smear review.  -Considered CT abdomen/pelvis.  -If thrombocytopenia does not improve, bone marrow biopsy will be considered.[BK1.1]     Revision History        User Key Date/Time User Provider Type Action    > BK1.1 6/13/2017  4:57 PM Nahum Pa MD Physician Sign            Progress Notes by Elsa Goddard LICSW at 6/13/2017  2:50 PM     Author:  Elsa Goddard LICSW Service:  (none) Author Type:      Filed:  6/13/2017  2:51 PM Date of Service:  6/13/2017  2:50 PM Creation Time:  6/13/2017  2:50 PM    Status:  Signed :  Elsa Goddard LICSW ()         RACHAEL CAMACHO: Daughter Nery is familiar with [facility] and is asking for a referral to made to this facility.  Referral made DOD.[KH1.1]     Revision History        User Key Date/Time User Provider Type Action    > KH1.1 6/13/2017  2:51 PM Elsa Goddard LICSW  Sign            Progress Notes by Elsa Goddard LICSW at 6/13/2017  8:56 AM     Author:  Elsa Goddard LICSW Service:  (none) Author Type:      Filed:  6/13/2017 11:37 AM Date of Service:  6/13/2017  8:56 AM Creation Time:  6/13/2017  8:56 AM    Status:  Addendum :  Elsa Goddard LICSW ()         RACHAEL  D:  Have made several attempts to meet with patient since yesterday afternoon however other staff have been with her providing care.  Will see her as soon as she is available to discuss her interested in transferring to [facility].  Note Hem/Onc will see patient today.[KH1.1]    Update:  Met with patient.  She explains two relatives have been at [facility] and highly recommended the facility.  Writer explained currently the facility only has private  rooms available which charge a daily fee of $40.00 which is not covered by insurance.  Patient stated she would like a private room and requested the referral be started. Referral made to [facility]. Also explained some facilities have private rooms at no additional fee.  Will be giving her a list of these facilities.[KH1.2]      Update:  Met with patient and daughter Nery Gabriel reviewed the TCU list and asked if [facility] or [facility] have private rooms with no additional cost.  [facility] has a double room available today and a private available on Wednesday at no additional cost.  If patient is d/c'd today and goes to [facility] today, the facility can move patient into the private room on Wednesday.  We discussed transportation and family will transport on day of d/c.  P: Writer will update daughter and patient once writer hears back [facility].[KH1.3]    Addendum:  Daughter Nery called writer back to discuss Substance Abuse Treatment for patient post TCU.  Writer explained to daughter and patient that under her Medicare insurance there is one treatment program which is [facility] in [facility] which has both In-patient and Out Patient programs.  Daughter stated to writer and her mother that she will need In-patient.  With patient permission, writer is giving daughter the phone number to call and schedule an assessment for patient.[KH1.4]      [facility] declined patient secondary to her ETOH history.[KH1.5] Awaiting to hear back from [facility].  Have updated [facility] regarding patient's plan to seek Substance Abuse treatment.  Sara at [facility] has accepted patient for admission today or tomorrow.[KH1.6]     Revision History        User Key Date/Time User Provider Type Action    > [N/A] 6/13/2017 11:37 AM Elsa Goddard LICSW  Addend     [N/A] 6/13/2017 11:37 AM Elsa Goddard LICSW  Addend     KH1.6 6/13/2017 11:34 AM  Elsa Goddard LICSW  Addend     KH1.5 6/13/2017 11:33 AM Elsa Goddard LICSW       KH1.4 6/13/2017 11:19 AM Elsa Goddard LICSW  Addend     KH1.3 6/13/2017 11:05 AM Elsa Goddard LICSW  Incomplete Revision     KH1.2 6/13/2017 10:27 AM Elsa Goddard LICSW  Addend     KH1.1 6/13/2017  8:59 AM Elsa Goddard LICSW  Sign            Progress Notes by Lina Caldera MD at 6/13/2017  8:45 AM     Author:  Lina Caldera MD Service:  Hospitalist Author Type:  Physician    Filed:  6/13/2017  8:57 AM Date of Service:  6/13/2017  8:45 AM Creation Time:  6/13/2017  8:45 AM    Status:  Signed :  Lina Caldera MD (Physician)         [facility]    Internal Medicine Hospitalist Progress Note  06/13/2017  I evaluated patient on the above date.    Assessment & Plan    Ms. Kavitha Hartman is a pleasant 71-year-old female with past medical history including HTN, DLD, mod AS, dep/anx, EtOH abuse, chronic back pain, recent LE cellulitis ond oral antibiotics, who presented 6/6 with worsening LE erythema and swelling.     BLE cellulitis vs worsening chronic venous stasis:  Recently treated for cellulitis with abx including clindamycin, then switched to minocycline and Keflex (due to intolerance).  - On initial eval 6/6, pt afebrile, nl WBC. BLE US 6/6 negative. Started on IV vanco 6/6. Given a dose of IV Lasix 6/6.  - Improved 6/7. Procal 1.99 6/7. Seen by ID and abx changed to IV cefazolin 6/7 which was stopped 6/9  - Started clindamycin (Keflex recommended by ID, but given possible affect on LFT's /possible side effect of cholestatic jaundice, favored another option by ID, appreciate ID assistance), completed course of clindamycin 6/13  - Keep BLE's elevated.  - Monitor cultures, NGTD  - Management of LE edema as below.     Abnormal liver function tests, suspect from acute EtOH hepatitis, possible  chronic liver disease/early cirrhosis, ?acute worsening from meds: On admission with with elevated , , total bilirubin 2.3. Statin held on admission. Abd US 6/7 showed no acute findings and no nodular architecture but has fattly liver infiltration.  - Overall, suspect related to her daily alcohol drinking, but given cholestatic picture, concern if due to meds, vs autoimmune vs obstructive  - Has been on cefazolin, which could affect LFT's.  - Hepatitis panel negative, autoimmune serologies pending, off cefazolin as above.  - Abstinence from EtOH recommended.  - Holding PTA statin.  - LFTs slightly improved initially and bili up and remains elevated.  - discussed with Dr Graham, s/p EGD and EUS 6/12- friable distal esophagus-biopsied, and normal IHBD, CBD not well visualized. No obvious pancreatic mass. VViral serology for CMV and HSV sent. Possibly MRCP if Bili not improving.  - check  Ammonia level today, start on lactulose, titrate if needed/if ammonia is up.     Worsened chronic LE edema: PTA on PRN Lasix 20 mg daily. No signs of pulm edema or other signs of CHF. Likely due to underlying liver disease and low albumin.  * Given IV Lasix 6/6 as above.  * Leg swelling improved 6/7. Echo 6/7 showed LVEF >70%, suggestion of impaired LV relaxation, mod AS, ascending aorta is mildly dilated.  - Given worsened Cr and hyponatremia, Lasix held.  - Continue leg elevation.  - Edema worsened with IVF but IVF stopped 6/11. Stable.  - nutrition consult, nutritional supplement. Needs follow up.  - encourage IS as mildly hypoxic at sleep, if dyspneic/persistent hypoxia, will try lasix.     Acute kidney injury, stage 1  - Cr normal on admission, baseline 0.8-1, but increased upto 1.4  - Fto be prerenal, Nephrology consulted, was maintained on NS, and now Cr better to baseline.  - Monitor BMP.  - Avoid nephrotoxic medications.     Hyponatremia, possibly nutrional or related to volume overload, ?due to liver  disease.  - Na 129 on admission 6/6. Given Lasix 6/6.  - Hyponatremia workup labs not suggestive of SIADH, no overt CHF/volume overload  - tolerated IV fluid as above, and sodium improved to 133   - appreciate nephrology assistance, has signed off.     Thrombocytopenia.  * Unclear etiology; possibly from liver disease- though normal at presentation, medication effect or from other cause including low grade DIC.  - Platelet count continues to drop, 166-->143-->118-->77-->65-->51  - Lovenox discontinued.  - Consider platelet transfusion if needs a procedure and less than 50,000; or if less than 10,000.  - HIT panel negative  - Has elevated dimer, low fibrinogen, mildly elevated INR, concern for DIC vs again due to liver disease, awaiting hematology eval     EtOH abuse: She reported drinking 2-3 cocktails per night. She denied having history of alcohol withdrawal in the past.    - Her blood work here suggests chronic alcoholic use with hyponatremia, hypokalemia, and elevated liver function tests; also fatty liver on US. INR normal.  - More tremulous 6/8, started on CIWA 6/8.  - Stable 6/9 and 6/10 though tremulous and unsteady.  - On CIWA protocol.  - Abstinence from EtOH recommended.     Hypokalemia, could be due nutritional or related to PRN Lasix at home.  - on K replacement protocol.     Moderate aortic stenosis.  * Her last echocardiogram was in 08/2015.  Follows with [facility].  * Echo 6/7 showed LVEF >70%, suggestion of impaired LV relaxation, mod AS, ascending aorta is mildly dilated.  - Monitor i/o's, daily wts.  - F/u Cardiologist for further monitoring.     Hypertension (benign essential).  [PTA: metoprolol XL 25 mg daily.]  - Continue metoprolol XL.     Dyslipidemia.  - Holding atorvastatin as above.     Depression/anxiety.   * Psychiatry consulted and started Trintellix and Seroquel 6/9.  - Hold on starting Seroquel due to rising LFT's.  - Continue Remeron and trazodone.  - Apprec help from   "[facility]     Insomnia.  - Continue PRN temazepam.     Chronic back pain.  * On narcotic agreement.  - Continue gabapentin, PTA PRN Ultram 50 mg BID.     Prophylaxis.  - PCD's. lovenox stopped due to thrombocytopenia  - PPI discontinued given thrombocytopenia, ranitidine.    CODE status: Full  Unable to reach daughter for update. I have left a voice mail. Will follow up this afternoon. Discussed with RN.    Interval History      C/o increased tremor, nausea and abdominal discomfort.  Feels tired.  back pain, mid thoracic region has improved.  Feels constipated, did have BM but small, and feels like not able to evacuate completely. Reports she does manual evacuation at times.    -Data reviewed today: I reviewed all new labs over the last 24 hours. I personally reviewed no images or EKG's today.    Physical Exam   Heart Rate: 73, Blood pressure 116/78, pulse 97, temperature 97.8  F (36.6  C), temperature source Oral, resp. rate 18, height 1.626 m (5' 4\"), weight 89.7 kg (197 lb 12 oz), SpO2 98 %, not currently breastfeeding.  Vitals:    06/11/17 0653 06/12/17 0618 06/13/17 0626   Weight: 86.5 kg (190 lb 11.2 oz) 88.5 kg (195 lb) 89.7 kg (197 lb 12 oz)     Vital Signs with Ranges  Temp:  [97.4  F (36.3  C)-98.5  F (36.9  C)] 97.8  F (36.6  C)  Pulse:  [53-97] 97  Heart Rate:  [68-80] 73  Resp:  [7-18] 18  BP: ()/(53-86) 116/78  SpO2:  [88 %-100 %] 98 %  Patient Vitals for the past 24 hrs:   BP Temp Temp src Pulse Heart Rate Resp SpO2 Weight   06/13/17 0823 116/78 97.8  F (36.6  C) Oral 97 - 18 98 % -   06/13/17 0626 - - - - - - - 89.7 kg (197 lb 12 oz)   06/12/17 2330 102/53 97.4  F (36.3  C) Oral - 73 18 97 % -   06/12/17 2212 - - - - - - 95 % -   06/12/17 2200 - - - - - - (!) 89 % -   06/12/17 2000 - - - - - 18 99 % -   06/12/17 1900 - - - - - - (!) 88 % -   06/12/17 1844 - - - - - - 95 % -   06/12/17 1800 - - - - - - 99 % -   06/12/17 1757 115/71 97.4  F (36.3  C) Oral - 78 18 93 % -   06/12/17 1730 106/86 - " - - 72 10 99 % -   06/12/17 1721 103/80 - - - 73 9 99 % -   06/12/17 1720 (!) 75/62 - - - 68 (!) 7 99 % -   06/12/17 1710 (!) 84/76 - - - 70 (!) 7 99 % -   06/12/17 1700 92/63 - - - 69 8 98 % -   06/12/17 1652 92/76 - - - 73 11 99 % -   06/12/17 1648 - - - - 78 11 98 % -   06/12/17 1645 95/66 - - - - - 100 % -   06/12/17 0936 - - - - 80 - - -   06/12/17 0851 120/73 98.5  F (36.9  C) Oral 53 - 18 92 % -     I/O's Last 24 hours  I/O last 3 completed shifts:  In: -   Out: 200 [Urine:200]    Constitutional: Jaundiced. Alert, oriented, pleasant. Appears tired and more tremulous today.  HEENT: PERRLA, EOMI, conjunctival icterus noted. Face is puffy.  Respiratory: Clear, no crackles/wheezes. Normal work of breathing. Remains on room air.  Cardiovascular: RRR, +I-II/VI sys murmur, no tachycardia  GI: Soft, mild suprapubic tenderness (+), BS (+). Areas of ecchymosis over the abd wall stable  Skin/Integumen: BLE edema noted, no redness noted. Bruises over arms.  Neuro: AAOx3, follows verbal commands appropriately. CN 2-12 intact.       Data     Recent Labs  Lab 06/13/17  0800 06/12/17  0730 06/11/17  1540 06/11/17  0910 06/10/17  0823  06/07/17  0720   WBC 7.2 5.9  --   --  7.2  < > 6.2   HGB 11.7 11.3*  --   --  11.3*  < > 11.3*   * 100  --   --  100  < > 99   PLT Pending 51*  --  65* 77*  < > 166   INR  --   --  1.15*  --  1.19*  --  1.02   * 133  --  134 131*  < > 130*   POTASSIUM 4.7 4.5  --  4.4 3.8  < > 2.8*   CHLORIDE 96 98  --  95 92*  < > 85*   CO2 32 32  --  32 31  < > 36*   BUN 13 12  --  15 20  < > 18   CR 1.05* 0.92  --  0.85 1.04  < > 0.88   ANIONGAP 4 3  --  7 8  < > 9   BIRGIT 8.4* 7.7*  --  7.9* 7.8*  < > 7.9*   GLC 88 75  --  75 75  < > 66*   ALBUMIN 2.4* 2.1*  --  2.3* 2.0*  < > 2.2*   PROTTOTAL 5.7* 5.0*  --  5.9* 5.0*  < > 5.0*   BILITOTAL 8.8* 5.2*  --  5.1* 4.2*  < > 2.4*   ALKPHOS 262* 227*  --  257* 215*  < > 173*   ALT 22 18  --  20 20  < > 115*   AST 71* 54*  --  68* 66*  < > 212*   < > =  values in this interval not displayed.  Recent Labs   Lab Test  06/13/17   0800  06/12/17   0730  06/11/17   0910  06/10/17   0823  06/09/17   0745   04/11/12   1214  04/11/12   0649   GLC  88  75  75  75  88   < >   --    --    BGM   --    --    --    --    --    --   134*  112*    < > = values in this interval not displayed.       No results found for this or any previous visit (from the past 24 hour(s)).    Medications   All medications were reviewed.       lactulose  10 g Oral BID     sucralfate  1 g Oral 4x Daily AC & HS     ranitidine  150 mg Oral BID     magnesium oxide  400 mg Oral Daily     vitamin  B-1  100 mg Oral Daily     vortioxetine  5 mg Oral Daily     clindamycin  300 mg Oral Q8H AMOR     metoprolol  12.5 mg Oral Daily     gabapentin (NEURONTIN) tablet 600 mg  600 mg Oral At Bedtime     gabapentin (NEURONTIN) capsule 300 mg  300 mg Oral Daily     mirtazapine  45 mg Oral At Bedtime     multivitamin, therapeutic  1 tablet Oral Daily     traZODone  100 mg Oral At Bedtime     sodium chloride (PF)  3 mL Intracatheter Q8H     folic acid  1 mg Oral Daily[TK1.1]        Revision History        User Key Date/Time User Provider Type Action    > TK1.1 6/13/2017  8:57 AM Lina Caldera MD Physician Sign                  Procedure Notes     No notes of this type exist for this encounter.      Progress Notes - Therapies (Notes from 06/13/17 through 06/16/17)     No notes of this type exist for this encounter.

## 2017-06-06 NOTE — IP AVS SNAPSHOT
"` `           [place] [address] [facility]: [phone]                                              INTERAGENCY TRANSFER FORM - NURSING   2017                    Hospital Admission Date: 2017  DARWIN JASSO   : 1945  Sex: Female        Attending Provider: Enedelia Rangel MD     Allergies:  Bactrim [Sulfamethoxazole W/trimethoprim], Codeine, Morphine    Infection:  None   Service:  HOSPITALIST    Ht:  1.626 m (5' 4\")   Wt:  90 kg (198 lb 6.6 oz)   Admission Wt:  81.6 kg (180 lb)    BMI:  34.06 kg/m 2   BSA:  2.02 m 2            Patient PCP Information     Provider PCP Type    Alison Pena MD General      Current Code Status     Date Active Code Status Order ID Comments User Context       Prior      Code Status History     Date Active Date Inactive Code Status Order ID Comments User Context    2017  9:26 AM  Full Code [id number]  Nan Macias DO Outpatient    2017  9:01 PM 2017  9:26 AM Full Code [id number]  Enedelia Rangel MD Inpatient    2003  4:20 PM 2003  4:20 PM  None  [place], Flor Demographics      Advance Directives        Does patient have a scanned Advance Directive/ACP document in EPIC?           No        Hospital Problems as of 2017              Priority Class Noted POA    Cellulitis Medium  2017 Yes      Non-Hospital Problems as of 2017              Priority Class Noted    Hip joint replacement status   2004    Knee joint replacement status   2005    Personal history of urinary calculi   2006    Hyperlipidemia LDL goal <130   2008    Spinal stenosis   2010    Chronic insomnia   Unknown    Alcohol abuse   3/14/2012    CKD (chronic kidney disease) stage 3, GFR 30-59 ml/min   2012    Chronic pain syndrome   Unknown    Bariatric surgery status   Unknown    Macrocytic anemia   Unknown    Anxiety   7/10/2014    Aortic stenosis Medium  2015    Left-sided low back pain without sciatica  "  7/2/2015    ACP (advance care planning)   11/12/2015    PAC (premature atrial contraction) Medium  3/1/2016    Gastroesophageal reflux disease, esophagitis presence not specified Medium  4/17/2016    Controlled substance agreement signed Medium  12/15/2016    Seasonal affective disorder (H) Medium  12/15/2016    HTN, goal below 140/90 Medium  12/15/2016    Bilateral lower leg cellulitis Medium  6/4/2017    Hypokalemia Medium  6/4/2017    Hyponatremia Medium  6/4/2017      Immunizations     Name Date      Influenza (High Dose) 3 valent vaccine 10/06/16     Influenza (High Dose) 3 valent vaccine 02/07/14     Influenza (IIV3) 10/11/12     Influenza (IIV3) 02/10/12     Influenza (IIV3) 10/20/10     Influenza (IIV3) 09/21/09     Influenza (IIV3) 12/29/08     Influenza (IIV3) 12/03/04     Influenza (IIV3) 12/03/03     Influenza (IIV3) 11/14/02     Influenza Vaccine IM 3yrs+ 4 Valent IIV4 10/08/15     Mantoux 01/29/16     Mantoux 01/14/09     Mantoux 06/04/07     Mantoux 04/24/06     Mantoux 04/06/05     Mantoux 04/26/04     Pneumococcal (PCV 13) 07/02/15     Pneumococcal 23 valent 12/29/08     TD (ADULT, 7+) 04/28/04     TDAP Vaccine (Adacel) 07/10/14          END      ASSESSMENT     Discharge Profile Flowsheet     EXPECTED DISCHARGE     Patient's communication style  spoken language (English or Bilingual) 06/07/17 0249    Expected Discharge Date  -- (6/16-6/17 TCU) 06/15/17 1510   SKIN      DISCHARGE NEEDS ASSESSMENT     Inspection  Partial (identify areas NOT inspected) 06/16/17 0216    Equipment Currently Used at Home  none (walker, cane, walking stick; 4WW) 06/10/17 1019   Skin WDL  ex 06/16/17 0216    Transportation Available  car;family or friend will provide 06/10/17 1019   Skin Color/Characteristics  bruised (ecchymotic);yellow 06/16/17 0216    GASTROINTESTINAL (ADULT,PEDIATRIC,OB)     Skin Temperature  warm 06/16/17 0216    GI WDL  WDL 06/16/17 0216   Skin Integrity  bruise(s) 06/16/17 0216    Abdominal  "Appearance  obese 06/16/17 0216   Skin areas NOT inspected  Buttock, left;Buttock, right;Sacrum;Coccyx 06/16/17 0216    Last Bowel Movement  06/15/17 06/15/17 1748   SAFETY      Passing flatus  yes 06/16/17 0216   Safety WDL  WDL 06/16/17 0216    COMMUNICATION ASSESSMENT                        Assessment WDL (Within Defined Limits) Definitions           Safety WDL     Effective: 09/28/15    Row Information: <b>WDL Definition:</b> Bed in low position, wheels locked; call light in reach; upper side rails up x 2; ID band on<br> <font color=\"gray\"><i>Item=AS safety wdl>>List=AS safety wdl>>Version=F14</i></font>      Skin WDL     Effective: 09/28/15    Row Information: <b>WDL Definition:</b> Warm; dry; intact; elastic; without discoloration; pressure points without redness<br> <font color=\"gray\"><i>Item=AS skin wdl>>List=AS skin wdl>>Version=F14</i></font>      Vitals     Vital Signs Flowsheet     VITAL SIGNS     Pain Intervention(s)  Heat applied 06/16/17 1004    Temp  98.4  F (36.9  C) 06/16/17 0733   Response to Interventions  Absence of nonverbal indicators of pain 06/16/17 0450    Temp src  Oral 06/16/17 0733   ANALGESIA SIDE EFFECTS MONITORING      Resp  18 06/16/17 0730   Side Effects Monitoring: Respiratory Quality  R 06/16/17 0450    Pulse  86 06/15/17 2036   Side Effects Monitoring: Respiratory Depth  N 06/16/17 0450    Heart Rate  85 06/16/17 0730   Side Effects Monitoring: Sedation Level  S 06/16/17 0450    Pulse/Heart Rate Source  Monitor 06/16/17 0730   HEIGHT AND WEIGHT      BP  131/80 06/16/17 0730   Height  1.626 m (5' 4\") 06/06/17 1419    BP Location  Left arm 06/16/17 0730   Height Method  Stated 06/06/17 1419    OXYGEN THERAPY     Weight  90 kg (198 lb 6.6 oz) 06/16/17 0703    SpO2  96 % 06/16/17 0730   BSA (Calculated - sq m)  1.92 06/06/17 1419    O2 Device  None (Room air) 06/16/17 0730   BMI (Calculated)  30.96 06/06/17 1419    Oxygen Delivery  2 LPM 06/13/17 0825   POSITIONING      PAIN/COMFORT  "    Body Position  independently positioning 06/16/17 1004    Patient Currently in Pain  yes 06/16/17 1004   Head of Bed (HOB)  HOB at 20-30 degrees 06/16/17 1004    Preferred Pain Scale  number (Numeric Rating Pain Scale) 06/16/17 1004   Positioning/Transfer Devices  pillows;in use 06/16/17 1004    Patient's Stated Pain Goal  No pain 06/16/17 0307   Chair  Shifted weight 06/16/17 1004    0-10 Pain Scale  6 06/16/17 1004   DAILY CARE      Pain Location  Hip (lower back) 06/16/17 1004   Activity Type  ambulated to bathroom 06/16/17 1005    Pain Orientation  Left 06/16/17 1004   Activity Level of Assistance  assistance, stand-by 06/16/17 1005    Pain Descriptors  Aching 06/16/17 1004   Activity Assistive Device  gait belt;walker 06/16/17 0057    Pain Management Interventions  heat applied 06/16/17 1004                 Patient Lines/Drains/Airways Status    Active LINES/DRAINS/AIRWAYS     Name: Placement date: Placement time: Site: Days: Last dressing change:    Closed/Suction Drain Abdomen 04/06/12      Abdomen   1897     Peripheral IV 09/08/16 Right Hand 09/08/16   1017   Hand   281     Peripheral IV 06/14/17 Right Lower forearm 06/14/17   2338   Lower forearm   1     Incision/Surgical Site 04/06/12 Bilateral Breast 04/06/12       1897     Incision/Surgical Site 04/06/12 Abdomen 04/06/12       1897     Incision/Surgical Site Bilateral Other (Comment)             Incision/Surgical Site 06/07/12 Left Breast 06/07/12   1438    1834             Patient Lines/Drains/Airways Status    Active PICC/CVC     None            Intake/Output Detail Report     Date Intake       Output Net    Shift P.O. I.V. IV Piggyback Colloid Total Urine Total       Noc 06/14/17 2300 - 06/15/17 0659 -- -- -- -- -- 400 400 -400    Day 06/15/17 0700 - 06/15/17 1459 -- -- -- -- -- 1000 1000 -1000    Kenyatta 06/15/17 1500 - 06/15/17 2259 -- -- -- -- -- 100 100 -100    Noc 06/15/17 2300 - 06/16/17 0659 300 -- -- -- 300 400 400 -100    Day 06/16/17 0700 -  06/16/17 1459 -- -- -- -- -- -- -- 0      Last Void/BM       Most Recent Value    Urine Occurrence 1 at 06/15/2017 1916    Stool Occurrence 1 at 06/15/2017 1916      Case Management/Discharge Planning     Case Management/Discharge Planning Flowsheet     REFERRAL INFORMATION     Expected Discharge Date  -- (6/16-6/17 TCU) 06/15/17 1510    Referral Source  hospital clinician/department (specify) 06/13/17 0900   DISCHARGE PLANNING      New Steerage to  Clinics?  -- (care coordinator) 06/13/17 0900   Transportation Available  car;family or friend will provide 06/10/17 1019    Reason For Consult  facility placement 06/13/17 0900   FINAL RESOURCES      Record Reviewed  clinical discipline documentation 06/13/17 0900   Equipment Currently Used at Home  none (walker, cane, walking stick; 4WW) 06/10/17 1019     Assigned to Case  natasha mata 06/13/17 0900   ABUSE RISK SCREEN      LIVING ENVIRONMENT     QUESTION TO PATIENT:  Has a member of your family or a partner(now or in the past) intimidated, hurt, manipulated, or controlled you in any way?  no 06/07/17 0249    Lives With  significant other;alone (alone during week; significant other there on weekends) 06/10/17 1022   QUESTION TO PATIENT: Do you feel safe going back to the place where you are living?  yes 06/07/17 0249    Living Arrangements  house ([facility]) 06/10/17 1019   OBSERVATION: Is there reason to believe there has been maltreatment of a vulnerable adult (ie. Physical/Sexual/Emotional abuse, self neglect, lack of adequate food, shelter, medical care, or financial exploitation)?  no 06/07/17 0249    Provides Primary Care For  significant other 06/07/17 0249   (R) MENTAL HEALTH SUICIDE RISK      COPING/STRESS     Are you depressed or being treated for depression?  No 06/07/17 0249    Major Change/Loss/Stressor  none 06/07/17 1210   HOMICIDE RISK      EXPECTED DISCHARGE     Homicidal Ideation  no 06/07/17 0249

## 2017-06-06 NOTE — ED PROVIDER NOTES
History     Chief Complaint:  Edema and Redness    HPI   Kavitha Headley is a 71 year old female, with a history of cellulitis, who presents with edema and redness on lower extremities. On 5/21, the patient noticed lower extremity edema without any cracks or cuts in her skin. She went to her PCP for it and on 5/26 she started 300 mg Clindamycin. The patient had nausea and diarrhea with Clindamycin and stopped it on 5/30 when she visited her PCP. At that time she started Minocycline twice a day. On 6/2 the patient began to take Keflex 500 mg four times a day in conjunction with the Minocycline. The antibiotics have provided no relief. Additionally the patient has tried compresses and three days ago she tried LAURIE's at home, both without relief. She endorses appetite loss, warmth in legs, and shortness of breath with exertion. She denies swelling or mass of the groin, unexpected weight loss, vomiting, cough, fevers, shaking, chills, and chest pain.    Allergies:  Bactrim, hives  Codeine, nausea and itching  Morphine, nausea and itching     Medications:    Potassium chloride er 20 meq tbcr  Cephalexin (keflex) 500 mg capsule  Metoclopramide (reglan) 5 mg tablet  Minocycline (minocin/dynacin) 100 mg capsule  Furosemide (lasix) 20 mg tablet  Trazodone (desyrel) 100 mg tablet  Gabapentin (neurontin) 300 mg capsule  Valacyclovir (valtrex) 1000 mg tablet  Temazepam (restoril) 15 mg capsule  Mirtazapine (remeron) 45 mg tablet  Tramadol (ultram) 50 mg tablet  Naproxen (naprosyn) 500 mg tablet  Metoprolol (toprol xl) 25 mg 24 hr tablet  Atorvastatin (lipitor) 40 mg tablet  Multiple vitamins-minerals (centrum silver) per tablet  Aspirin 81 mg tablet    Past Medical History:    GERD  Cellulitis  Spinal stenosis  Alcohol abuse   Anxiety disorder   Bariatric surgery status   Benign hypertension   Chronic insomnia   Chronic low back pain   Chronic pain syndrome   Coronary artery disease   Disc disease, degenerative, cervical  "  Diverticulitis   Hip joint replacement status   Knee joint replacement status   Macrocytic anemia   Major depressive disorder, single episode, severe, without mention of psychotic behavior   Mixed hyperlipidemia   Moderate aortic stenosis   Pelvic relaxation disorder   Personal history of urinary calculi   PVC (premature ventricular contraction)   Stage III chronic kidney disease   SVT (supraventricular tachycardia) (H)       Past Surgical History:    Appendectomy  Gastric bypass  Mediastinoscopy  Left total knee arthroplasty  Carpal tunnel release  Cholecystectomy  Colonoscopy  Cystoscopy, lithotripsy  Cystoscopy, remove stents  Hernia repair  Hysterectomy  Right total hip arthroplasty  Laparotomy, lysis adhesions  Lymph node biopsy      Family History:    Father: Cancer and lung cancer, substance abuse    Social History:  Negative for tobacco use.  Positive for alcohol use, 2-3 drinks per day  Lives alone but boyfriend is often present  No work  Marital Status:   [5]     Review of Systems   Constitutional: Positive for appetite change. Negative for chills, fever and unexpected weight change.        Positive for leg warmth   Respiratory: Positive for shortness of breath. Negative for cough.    Cardiovascular: Negative for chest pain.   Gastrointestinal: Negative for diarrhea (resolved), nausea (resolved) and vomiting.   Genitourinary:        Negative for swelling or mass of groin   Musculoskeletal:        Positive for edema   Skin: Positive for color change. Negative for wound.   Neurological: Negative for tremors.   All other systems reviewed and are negative.      Physical Exam   First Vitals:  BP: 138/81  Pulse: 96  Heart Rate: 96  Temp: 99.1  F (37.3  C)  Resp: 18  Height: 162.6 cm (5' 4\")  Weight: 81.6 kg (180 lb)  SpO2: 98 %      Physical Exam  Constitutional: White female, supine, heavy set.  HENT: No signs of trauma.   Eyes: EOM are normal. Pupils are equal, round, and reactive to light.   Neck: " Normal range of motion. No JVD present. No cervical adenopathy.  Cardiovascular: Regular rhythm.  Exam reveals no gallop and no friction rub.    2/6 systolic murmur left upper sternal border  Pulmonary/Chest: Bilateral breath sounds normal. No wheezes, rhonchi or rales.  Abdominal: Soft. No tenderness. No rebound or guarding. Midline incisions, 1+ femoral pulses  Musculoskeletal: No tenderness. Trace edema bilaterally/ Red right lower leg extends below knee with horizontal line of demarcation. No warmth. No skin breaks.  Lymphadenopathy: No lymphadenopathy.   Neurological: Alert and oriented to person, place, and time. Normal strength. Coordination normal.   Skin: See above      Emergency Department Course   Imaging:  Radiographic findings were communicated with the patient who voiced understanding of the findings.    US Lower Extremity Venous Duplex Bilateral  No evidence for DVT within either leg. As per radiology.     Laboratory:  Blood culture: pending  Blood culture: pending    CBC: WBC: 6.3, HGB: 12.9, PLT: 217  CMP: NA: 129, Chloride: 80, Potassium: 2.5, Anion gap: 18, GFR: 53, Bilirubin: 2.3, Albumin: 2.6, Protein: 6.0, Alkphos: 199, ALT: 160, AST: 338, o/w WNL (Creatinine: 1.02)      Interventions:  1755 Potassium Chloride 40 mEq Oral  1820 Vancomycin 1000 mg IV    Emergency Department Course:  Nursing notes and vitals reviewed. I performed an exam of the patient as documented above.     The above workup was undertaken.    1706 I reevaluated the patient and provided an update in regards to her ED course.      1732 I updated patient with the care plan.    1806 I consulted with Dr. Rangel regarding the appropriate course of treatment for the patient.     Findings and plan explained to the Patient who consents to admission. Discussed the patient with Dr. Rangel, who will admit the patient to a bed for further monitoring, evaluation, and treatment.      Impression & Plan    Medical Decision Making:  Kavitha GARCIA  Fang is a 71 year old female who presents to the ED with persistent redness and discomfort in her right leg. She has been treated as an outpatient for cellulitis for the past several weeks, going through three courses of antibiotics. Currently, Keflex and Minocycline and before that Clindamycin, but she cannot finish that. She states once before she had cellulitis, which was difficult to treat. Patient had no cracks or sores in her skin that may have caused this. She has slight swelling in her legs. She denies fevers, shaking, or chills, but she did not get this before either. On examination, she has trace edema bilaterally. There is redness in her lower leg that extends almost to the knee. There is not significant warmth, however. Labs obtained. WBC is normal. Electrolytes are abnormal with low potassium and sodium. Reviewing her chart she had recently been started on diuretic, which might be cause. Patient is also elevated in liver functions with no obvious reasons. She previously had her gallbladder removed and has no abdominal pan or discomfort. This may be medication related as well. I have began patient on potassium replacement and imperially started her on Vancomycin. I am unsure if this is truly cellulitis, or this is some type of dermatitis. Although, this seemed very localized. She has been wearing LAURIE socks recently, but her redness had begun before that. I discussed patient with Dr. Rangel, who will admit patient to correct electrolytes, and workup liver abnormality. Consult to help determine best course of this possible cellulitis.     Diagnosis:    ICD-10-CM    1. Cellulitis of right lower extremity L03.115    2. Hypokalemia E87.6    3. Elevated LFTs R79.89        Disposition:  Admitted to Dr. Rangel    I, Ember Lovell, am serving as a scribe on 6/6/2017 at 2:51 PM to personally document services performed by Bobby Guajardo MD based on my observations and the provider's statements to me.      Ember Lovell  6/6/2017    EMERGENCY DEPARTMENT       Bobby Guajardo MD  06/06/17 1954

## 2017-06-06 NOTE — IP AVS SNAPSHOT
MRN:[id number]                      After Visit Summary   6/6/2017    Kavitha Headley    MRN: [id number]           Thank you!     Thank you for choosing [facility] for your care. Our goal is always to provide you with excellent care. Hearing back from our patients is one way we can continue to improve our services. Please take a few minutes to complete the written survey that you may receive in the mail after you visit with us. Thank you!        Patient Information     Date Of Birth          1945        Designated Caregiver       Most Recent Value    Caregiver    Will someone help with your care after discharge? yes    Name of designated caregiver family    Phone number of caregiver none    Caregiver address her home      About your hospital stay     You were admitted on:  June 6, 2017 You last received care in the:  [facility] [address] [facility]    You were discharged on:  June 16, 2017        Reason for your hospital stay       Evaluation of your lower extremity swelling and abnormal liver tests. It was determined that you likely have liver damage which is related to your alcohol use. Your medications were adjusted.                  Who to Call     For medical emergencies, please call 911.  For non-urgent questions about your medical care, please call your primary care provider or clinic, [phone]  For questions related to your surgery, please call your surgery clinic        Attending Provider     Provider Specialty    Bobby Guajardo MD Emergency Medicine    Nistor, Enedelia Franco MD Internal Medicine       Primary Care Provider Office Phone # Fax #    Alison Pena [phone]      After Care Instructions     Activity - Up with nursing assistance           Advance Diet as Tolerated       Follow this diet upon discharge: 2g salt            Daily weights       Call Provider for weight gain of more than 2 pounds per day or 5 pounds per week.             General info for SNF       Length of Stay Estimate: Short Term Care: Estimated # of Days <30  Condition at Discharge: Improving  Level of care:skilled   Rehabilitation Potential: Good  Admission H&P remains valid and up-to-date: Yes  Recent Chemotherapy: N/A  Use Nursing Home Standing Orders: N/A            Mantoux instructions       Give two-step Mantoux (PPD) Per Facility Policy Yes                  Follow-up Appointments     Follow Up and recommended labs and tests       1. Follow up with PCP or facility physician in 5-7 days with repeat CMP and CBC.  2. Follow up with Dr. Graham (gastroenterology) in clinic in 2-4 weeks.  3. Follow up with Dr. Pa (hematology/oncology) in 2-4 weeks.                  Your next 10 appointments already scheduled     Jun 16, 2017  4:15 PM CDT   New Visit with Nayeli Bartholomew MD   [facility] AT [place] ([facility])    [address] [place] [id number] [phone]              Additional Services     Occupational Therapy Adult Consult       Evaluate and treat as clinically indicated.    Reason:  deconditioning            Physical Therapy Adult Consult       Evaluate and treat as clinically indicated.    Reason:  deconditioning                  Pending Results     Date and Time Order Name Status Description    6/13/2017 0000 HSV IgM antibody In process             Statement of Approval     Ordered          06/16/17 0927  I have reviewed and agree with all the recommendations and orders detailed in this document.  EFFECTIVE NOW     Approved and electronically signed by:  Nan Macias DO             Admission Information     Date & Time Provider Department Dept. Phone    6/6/2017 Enedelia Rangel MD [facility] [address] [facility] [phone]      Your Vitals Were     Blood Pressure Pulse Temperature Respirations Height Weight    131/80 (BP Location: Left arm) 86 98.4  F (36.9  C)  "(Oral) 18 1.626 m (5' 4\") 90 kg (198 lb 6.6 oz)    Pulse Oximetry BMI (Body Mass Index)                96% 34.06 kg/m2          [employer] Information     [employer] gives you secure access to your electronic health record. If you see a primary care provider, you can also send messages to your care team and make appointments. If you have questions, please call your primary care clinic.  If you do not have a primary care provider, please call [phone] and they will assist you.        Care EveryWhere ID     This is your Care EveryWhere ID. This could be used by other organizations to access your [facility] medical records  [id number]           Review of your medicines      START taking        Dose / Directions    folic acid 1 MG tablet   Commonly known as:  FOLVITE   Used for:  Alcohol abuse        Dose:  1 mg   Take 1 tablet (1 mg) by mouth daily   Quantity:  30 tablet   Refills:  0       lactulose 10 GM/15ML solution   Commonly known as:  CHRONULAC   Used for:  Elevated LFTs, Constipation, unspecified constipation type, Alcoholic fatty liver        Dose:  10 g   Take 15 mLs (10 g) by mouth 2 times daily   Refills:  0       magnesium oxide 400 MG tablet   Commonly known as:  MAG-OX   Used for:  Low magnesium levels        Dose:  400 mg   Take 1 tablet (400 mg) by mouth daily   Quantity:  7 tablet   Refills:  0       polyethylene glycol Packet   Commonly known as:  MIRALAX/GLYCOLAX   Used for:  Constipation, unspecified constipation type        Dose:  17 g   Take 17 g by mouth daily as needed (constipation)   Quantity:  7 packet   Refills:  0       * QUEtiapine 25 MG tablet   Commonly known as:  SEROquel   Used for:  Depression, unspecified depression type        Dose:  25 mg   Take 1 tablet (25 mg) by mouth At Bedtime   Quantity:  60 tablet   Refills:  0       * QUEtiapine 25 MG tablet   Commonly known as:  SEROquel   Used for:  Anxiety        Dose:  12.5 mg   Take 0.5 tablets (12.5 mg) by mouth 3 times daily as needed " (anxiety)   Quantity:  60 tablet   Refills:  0       ranitidine 150 MG tablet   Commonly known as:  ZANTAC   Used for:  Gastroesophageal reflux disease, esophagitis presence not specified, Esophagitis        Dose:  150 mg   Take 1 tablet (150 mg) by mouth 2 times daily   Quantity:  60 tablet   Refills:  0       senna-docusate 8.6-50 MG per tablet   Commonly known as:  SENOKOT-S;PERICOLACE   Used for:  Constipation, unspecified constipation type        Dose:  1-2 tablet   Take 1-2 tablets by mouth 2 times daily as needed for constipation   Quantity:  100 tablet   Refills:  0       sucralfate 1 GM/10ML suspension   Commonly known as:  CARAFATE   Used for:  Esophagitis        Dose:  1 g   Take 10 mLs (1 g) by mouth 4 times daily (before meals and nightly)   Quantity:  1200 mL   Refills:  0       thiamine 100 MG tablet   Used for:  Alcohol abuse        Dose:  100 mg   Take 1 tablet (100 mg) by mouth daily   Refills:  0       vortioxetine 5 MG tablet   Commonly known as:  TRINTELLIX/BRINTELLIX   Used for:  Depression, unspecified depression type        Dose:  5 mg   Take 1 tablet (5 mg) by mouth daily   Refills:  0       * Notice:  This list has 2 medication(s) that are the same as other medications prescribed for you. Read the directions carefully, and ask your doctor or other care provider to review them with you.      CONTINUE these medicines which may have CHANGED, or have new prescriptions. If we are uncertain of the size of tablets/capsules you have at home, strength may be listed as something that might have changed.        Dose / Directions    * GABAPENTIN PO   This may have changed:  Another medication with the same name was changed. Make sure you understand how and when to take each.        Dose:  600 mg   Take 600 mg by mouth At Bedtime   Refills:  0       * gabapentin 300 MG capsule   Commonly known as:  NEURONTIN   This may have changed:    - medication strength  - when to take this  - reasons to take  this  - additional instructions   Used for:  Chronic pain syndrome        Dose:  300 mg   Take 1 capsule (300 mg) by mouth daily   Quantity:  90 capsule   Refills:  0       metoprolol 25 MG 24 hr tablet   Commonly known as:  TOPROL XL   This may have changed:  how much to take   Used for:  PVC's (premature ventricular contractions)        Dose:  12.5 mg   Take 0.5 tablets (12.5 mg) by mouth daily   Quantity:  90 tablet   Refills:  3       mirtazapine 15 MG tablet   Commonly known as:  REMERON   This may have changed:    - medication strength  - how much to take   Used for:  Depression, unspecified depression type        Dose:  15 mg   Take 1 tablet (15 mg) by mouth At Bedtime   Quantity:  30 tablet   Refills:  0       traMADol 50 MG tablet   Commonly known as:  ULTRAM   This may have changed:    - how much to take  - how to take this  - when to take this  - reasons to take this   Used for:  Chronic pain syndrome, Controlled substance agreement signed        Dose:  25 mg   Take 0.5 tablets (25 mg) by mouth 2 times daily as needed for moderate pain TAKE 1 TABLET BY MOUTH TWICE DAILY AS NEEDED FOR MODERATE PAIN   Quantity:  60 tablet   Refills:  0       * Notice:  This list has 2 medication(s) that are the same as other medications prescribed for you. Read the directions carefully, and ask your doctor or other care provider to review them with you.      CONTINUE these medicines which have NOT CHANGED        Dose / Directions    CENTRUM SILVER per tablet        Dose:  1 tablet   Take 1 tablet by mouth daily   Refills:  0       metoclopramide 5 MG tablet   Commonly known as:  REGLAN   Used for:  Nausea        Dose:  5 mg   Take 1 tablet (5 mg) by mouth 4 times daily as needed   Quantity:  40 tablet   Refills:  0       valACYclovir 1000 mg tablet   Commonly known as:  VALTREX   Used for:  Herpes simplex virus infection        TAKE 2 TABS EVERY 12 HRS FOR 1 DAY ONLY FOR OUTBREAKS OF COLD SORES as needed   Quantity:  4  tablet   Refills:  3         STOP taking     aspirin 81 MG tablet           atorvastatin 40 MG tablet   Commonly known as:  LIPITOR           cephALEXin 500 MG capsule   Commonly known as:  KEFLEX           furosemide 20 MG tablet   Commonly known as:  LASIX           minocycline 100 MG capsule   Commonly known as:  MINOCIN/DYNACIN           naproxen 500 MG tablet   Commonly known as:  NAPROSYN           Potassium Chloride ER 20 MEQ Tbcr           temazepam 15 MG capsule   Commonly known as:  RESTORIL           traZODone 100 MG tablet   Commonly known as:  DESYREL                Where to get your medicines      Some of these will need a paper prescription and others can be bought over the counter. Ask your nurse if you have questions.     Bring a paper prescription for each of these medications     traMADol 50 MG tablet       You don't need a prescription for these medications     folic acid 1 MG tablet    gabapentin 300 MG capsule    lactulose 10 GM/15ML solution    magnesium oxide 400 MG tablet    metoprolol 25 MG 24 hr tablet    mirtazapine 15 MG tablet    polyethylene glycol Packet    QUEtiapine 25 MG tablet    QUEtiapine 25 MG tablet    ranitidine 150 MG tablet    senna-docusate 8.6-50 MG per tablet    sucralfate 1 GM/10ML suspension    thiamine 100 MG tablet    vortioxetine 5 MG tablet                Protect others around you: Learn how to safely use, store and throw away your medicines at www.disposemymeds.org.             Medication List: This is a list of all your medications and when to take them. Check marks below indicate your daily home schedule. Keep this list as a reference.      Medications           Morning Afternoon Evening Bedtime As Needed    CENTRUM SILVER per tablet   Take 1 tablet by mouth daily                                folic acid 1 MG tablet   Commonly known as:  FOLVITE   Take 1 tablet (1 mg) by mouth daily   Last time this was given:  1 mg on 6/16/2017  8:25 AM                                 * GABAPENTIN PO   Take 600 mg by mouth At Bedtime   Last time this was given:  300 mg on 6/16/2017  8:25 AM                                * gabapentin 300 MG capsule   Commonly known as:  NEURONTIN   Take 1 capsule (300 mg) by mouth daily   Last time this was given:  300 mg on 6/16/2017  8:25 AM                                lactulose 10 GM/15ML solution   Commonly known as:  CHRONULAC   Take 15 mLs (10 g) by mouth 2 times daily   Last time this was given:  10 g on 6/16/2017  8:26 AM                                magnesium oxide 400 MG tablet   Commonly known as:  MAG-OX   Take 1 tablet (400 mg) by mouth daily   Last time this was given:  400 mg on 6/16/2017  8:25 AM                                metoclopramide 5 MG tablet   Commonly known as:  REGLAN   Take 1 tablet (5 mg) by mouth 4 times daily as needed                                metoprolol 25 MG 24 hr tablet   Commonly known as:  TOPROL XL   Take 0.5 tablets (12.5 mg) by mouth daily   Last time this was given:  12.5 mg on 6/16/2017  8:25 AM                                mirtazapine 15 MG tablet   Commonly known as:  REMERON   Take 1 tablet (15 mg) by mouth At Bedtime   Last time this was given:  15 mg on 6/15/2017  9:43 PM                                polyethylene glycol Packet   Commonly known as:  MIRALAX/GLYCOLAX   Take 17 g by mouth daily as needed (constipation)                                * QUEtiapine 25 MG tablet   Commonly known as:  SEROquel   Take 1 tablet (25 mg) by mouth At Bedtime   Last time this was given:  12.5 mg on 6/16/2017  3:05 AM                                * QUEtiapine 25 MG tablet   Commonly known as:  SEROquel   Take 0.5 tablets (12.5 mg) by mouth 3 times daily as needed (anxiety)   Last time this was given:  12.5 mg on 6/16/2017  3:05 AM                                ranitidine 150 MG tablet   Commonly known as:  ZANTAC   Take 1 tablet (150 mg) by mouth 2 times daily   Last time this was given:  150 mg on  6/16/2017  8:25 AM                                senna-docusate 8.6-50 MG per tablet   Commonly known as:  SENOKOT-S;PERICOLACE   Take 1-2 tablets by mouth 2 times daily as needed for constipation                                sucralfate 1 GM/10ML suspension   Commonly known as:  CARAFATE   Take 10 mLs (1 g) by mouth 4 times daily (before meals and nightly)   Last time this was given:  1 g on 6/16/2017  6:49 AM                                thiamine 100 MG tablet   Take 1 tablet (100 mg) by mouth daily   Last time this was given:  100 mg on 6/16/2017  8:25 AM                                traMADol 50 MG tablet   Commonly known as:  ULTRAM   Take 0.5 tablets (25 mg) by mouth 2 times daily as needed for moderate pain TAKE 1 TABLET BY MOUTH TWICE DAILY AS NEEDED FOR MODERATE PAIN   Last time this was given:  25 mg on 6/16/2017  3:05 AM                                valACYclovir 1000 mg tablet   Commonly known as:  VALTREX   TAKE 2 TABS EVERY 12 HRS FOR 1 DAY ONLY FOR OUTBREAKS OF COLD SORES as needed                                vortioxetine 5 MG tablet   Commonly known as:  TRINTELLIX/BRINTELLIX   Take 1 tablet (5 mg) by mouth daily   Last time this was given:  5 mg on 6/16/2017  8:26 AM                                * Notice:  This list has 4 medication(s) that are the same as other medications prescribed for you. Read the directions carefully, and ask your doctor or other care provider to review them with you.

## 2017-06-06 NOTE — ED NOTES
[facility]  ED Nurse Handoff Report    ED Chief complaint: Cellulitis (Pt presents with bilateral lower leg cellulits. Pt reports being on antibiotic tx for 10-12 days w/o relief or improvement. Pt states L leg reddness staying the same but R leg reddness spreading up knee. Tenderness, redness and warmth present. Pt denies fevers)      ED Diagnosis:   Final diagnoses:   Cellulitis of right lower extremity   Hypokalemia   Elevated LFTs       Code Status: Full Code    Allergies:   Allergies   Allergen Reactions     Bactrim [Sulfamethoxazole W/Trimethoprim] Hives     Codeine Itching     NAUSEA     Morphine Itching     NAUSEA       Activity level - Baseline/Home:  Independent    Activity Level - Current:   Independent     Needed?: No    Isolation: No  Infection: Not Applicable    Bariatric?: No    Vital Signs:   Vitals:    06/06/17 1715 06/06/17 1730 06/06/17 1745 06/06/17 1800   BP:  106/63  106/54   Pulse:       Resp:       Temp:       TempSrc:       SpO2: 96% 96% 96%    Weight:       Height:           Cardiac Rhythm: ,        Pain level: 0-10 Pain Scale: 4    Is this patient confused?: No    Patient Report: Initial Complaint: generalized body aches, SOB, states her cellulitis on her legs is getting worse despite po antibiotics  Focused Assessment: AOx4. AVSS. BLE brown and warm. Patient c/o intermittent SOB and generalized aches and fatigue. Voiding w/o difficulty. Given courtesy meal in ED.  Tests Performed: labs, US of BLE  Abnormal Results: low sodium and potassium, elevated liver enzymes  Treatments provided: IV abx, 40 MeQ po potassium  Family Comments: daughter at bedside  OBS brochure/video discussed/provided to patient: N/A    ED Medications:   Medications   vancomycin (VANCOCIN) 1000 mg in dextrose 5% 200 mL PREMIX (1,000 mg Intravenous New Bag 6/6/17 8533)   potassium chloride (KLOR-CON) Packet 40 mEq (40 mEq Oral Given 6/6/17 5757)       Drips infusing?:  No      ED NURSE PHONE  NUMBER: *[id number]

## 2017-06-06 NOTE — PHARMACY-ADMISSION MEDICATION HISTORY
Admission medication history interview status for the 6/6/2017  admission is complete. See EPIC admission navigator for prior to admission medications     Medication history source reliability:Good    Actions taken by pharmacist (provider contacted, etc):None     Additional medication history information not noted on PTA med list :    ---  Pt started cephalexin on 6/2/2017 and took 2 doses on the first day.  Pt started minocycline on 5/30/2017 PM.  ---  Potassium chloride is a new prescription from today that pt hasn't started.    Medication reconciliation/reorder completed by provider prior to medication history? No    Time spent in this activity: 15 minutes    Prior to Admission medications    Medication Sig Last Dose Taking? Auth Provider   GABAPENTIN PO Take 600 mg by mouth At Bedtime 6/5/2017 at hs Yes Unknown, Entered By History   GABAPENTIN PO Take 300 mg by mouth daily as needed In the morning if needed prn Yes Unknown, Entered By History   cephALEXin (KEFLEX) 500 MG capsule Take 1 capsule (500 mg) by mouth 4 times daily for 10 days 6/5/2017 Yes Ziyad Alejo PA-C   metoclopramide (REGLAN) 5 MG tablet Take 1 tablet (5 mg) by mouth 4 times daily as needed 6/5/2017 at x2 Yes Ziyad Alejo PA-C   minocycline (MINOCIN/DYNACIN) 100 MG capsule Take 1 capsule (100 mg) by mouth 2 times daily for 10 days 6/5/2017 Yes Alison Pena MD   furosemide (LASIX) 20 MG tablet Take 1 tablet (20 mg) by mouth daily 6/5/2017 at am Yes Alison Pena MD   traZODone (DESYREL) 100 MG tablet TAKE 1 TABLET (100 MG) BY MOUTH AT BEDTIME 6/5/2017 at hs Yes Alison Pena MD   valACYclovir (VALTREX) 1000 mg tablet TAKE 2 TABS EVERY 12 HRS FOR 1 DAY ONLY FOR OUTBREAKS OF COLD SORES as needed prn Yes Alison Pena MD   temazepam (RESTORIL) 15 MG capsule TAKE 1 CAPSULES BY MOUTH AT BETIME AS NEEDED TO SLEEP. MUST LAST 30 DAYS. prn Yes Alison Pena MD   mirtazapine (REMERON) 45 MG tablet Take 1 tablet (45 mg) by mouth At  Bedtime 6/5/2017 at hs Yes Alison Pena MD   traMADol (ULTRAM) 50 MG tablet TAKE 1 TABLET BY MOUTH TWICE DAILY AS NEEDED FOR MODERATE PAIN prn Yes Alison Pena MD   naproxen (NAPROSYN) 500 MG tablet Take 1 tablet (500 mg) by mouth 2 times daily as needed for moderate pain prn Yes Alison Pena MD   metoprolol (TOPROL XL) 25 MG 24 hr tablet Take 1 tablet (25 mg) by mouth daily 6/6/2017 at am Yes Alison Pena MD   atorvastatin (LIPITOR) 40 MG tablet Take 1 tablet (40 mg) by mouth daily 6/5/2017 at hs Yes Alison Pena MD   Multiple Vitamins-Minerals (CENTRUM SILVER) per tablet Take 1 tablet by mouth daily 6/5/2017 Yes Reported, Patient   aspirin 81 MG tablet Take 81 mg by mouth daily 6/5/2017 at am Yes Reported, Patient   Potassium Chloride ER 20 MEQ TBCR Take 1 tablet (20 mEq) by mouth daily Yoon Hoover MD

## 2017-06-06 NOTE — IP AVS SNAPSHOT
Kavitha Headley #[id number] (CSN: [id number])  (71 year old F)  (Adm: 17)     [id number]               [place] [address] MED[facility] SPECIALTY UNIT: [phone]            Patient Demographics     Patient Name Sex          Age SSN Address Phone    Fang Kavitha GARCIA Female 1945 (71 year old) [id number] [facility] [address] [phone] (Home)      Emergency Contact(s)     Name Relation Home Work Mobile    Cers, Sander Significant other none none [phone]    Flory Jarrell Sister [phone] none none    Nery Brown Daughter none none [phone]      Admission Information     Attending Provider Admitting Provider Admission Type Admission Date/Time    Enedelia Rangel MD Nistor, Doina Simona, MD Emergency 17  1424    Discharge Date Hospital Service Auth/Cert Status Service Area     Hospitalist [facility] SERVICES    Unit Room/Bed Admission Status       [facility] MED SPEC UNIT - Admission (Confirmed)       Admission     Complaint    Cellulitis, JAUNDICE      Hospital Account     Name Acct ID Class Status Primary Coverage    Kavitha Headley [id number] Inpatient Open MEDICARE - MEDICARE FOR HB SUPPLEMENT            Guarantor Account (for Hospital Account #[id number])     Name Relation to Pt Service Area Active? Acct Type    Kavitha Headley  FCS Yes Personal/Family    Address Phone          [facility] [place] [address] [phone](H)  none(O)              Coverage Information (for Hospital Account #[id number])     1. MEDICARE/MEDICARE FOR HB SUPPLEMENT     F/O Payor/Plan Precert #    MEDICARE/MEDICARE FOR HB SUPPLEMENT     Subscriber Subscriber #    Kavitha Headley [id number]    Address Phone    [employer]  [address]  [place] [address] [phone]          2. BCBS/BCBS PLATINUM BLUE     F/O Payor/Plan Precert #    BCBS/BCBS PLATINUM BLUE     Subscriber Subscriber #    Kavitha Headley  "[id number]    Address Phone    [address] [address]  [place], [place] [id number] [phone]                                                      INTERAGENCY TRANSFER FORM - PHYSICIAN ORDERS   6/6/2017                       [place] [address] MEDICAL SPECIALTY UNIT: [phone]            Attending Provider: Enedelia Rangel MD     Allergies:  Bactrim [Sulfamethoxazole W/trimethoprim], Codeine, Morphine    Infection:  None   Service:  HOSPITALIST    Ht:  1.626 m (5' 4\")   Wt:  90 kg (198 lb 6.6 oz)   Admission Wt:  81.6 kg (180 lb)    BMI:  34.06 kg/m 2   BSA:  2.02 m 2            ED Clinical Impression     Diagnosis Description Comment Added By Time Added    Cellulitis of right lower extremity [L03.115] Cellulitis of right lower extremity [L03.115]  Bobby Guajardo MD 6/6/2017  5:48 PM    Hypokalemia [E87.6] Hypokalemia [E87.6]  Bobby Guajardo MD 6/6/2017  5:48 PM    Elevated LFTs [R79.89] Elevated LFTs [R79.89]  Bobby Guajardo MD 6/6/2017  5:48 PM      Hospital Problems as of 6/16/2017              Priority Class Noted POA    Cellulitis Medium  6/6/2017 Yes      Non-Hospital Problems as of 6/16/2017              Priority Class Noted    Hip joint replacement status   4/1/2004    Knee joint replacement status   12/1/2005    Personal history of urinary calculi   6/1/2006    Hyperlipidemia LDL goal <130   7/18/2008    Spinal stenosis   12/21/2010    Chronic insomnia   Unknown    Alcohol abuse   3/14/2012    CKD (chronic kidney disease) stage 3, GFR 30-59 ml/min   11/28/2012    Chronic pain syndrome   Unknown    Bariatric surgery status   Unknown    Macrocytic anemia   Unknown    Anxiety   7/10/2014    Aortic stenosis Medium  6/7/2015    Left-sided low back pain without sciatica   7/2/2015    ACP (advance care planning)   11/12/2015    PAC (premature atrial contraction) Medium  3/1/2016    Gastroesophageal reflux disease, esophagitis presence not specified Medium  4/17/2016    Controlled " substance agreement signed Medium  12/15/2016    Seasonal affective disorder (H) Medium  12/15/2016    HTN, goal below 140/90 Medium  12/15/2016    Bilateral lower leg cellulitis Medium  6/4/2017    Hypokalemia Medium  6/4/2017    Hyponatremia Medium  6/4/2017      Code Status History     Date Active Date Inactive Code Status Order ID Comments User Context    6/16/2017  9:26 AM  Full Code [id number]  Nan Macias DO Outpatient    6/6/2017  9:01 PM 6/16/2017  9:26 AM Full Code [id number]  Enedelia Rangel MD Inpatient    12/2/2003  4:20 PM 12/2/2003  4:20 PM  None  Igl, Flor Demographics      Current Code Status     Date Active Code Status Order ID Comments User Context       Prior      Summary of Visit     Reason for your hospital stay       Evaluation of your lower extremity swelling and abnormal liver tests. It was determined that you likely have liver damage which is related to your alcohol use. Your medications were adjusted.                Medication Review      START taking        Dose / Directions Comments    folic acid 1 MG tablet   Commonly known as:  FOLVITE   Used for:  Alcohol abuse        Dose:  1 mg   Take 1 tablet (1 mg) by mouth daily   Quantity:  30 tablet   Refills:  0        lactulose 10 GM/15ML solution   Commonly known as:  CHRONULAC   Used for:  Elevated LFTs, Constipation, unspecified constipation type, Alcoholic fatty liver        Dose:  10 g   Take 15 mLs (10 g) by mouth 2 times daily   Refills:  0        magnesium oxide 400 MG tablet   Commonly known as:  MAG-OX   Used for:  Low magnesium levels        Dose:  400 mg   Take 1 tablet (400 mg) by mouth daily   Quantity:  7 tablet   Refills:  0        polyethylene glycol Packet   Commonly known as:  MIRALAX/GLYCOLAX   Used for:  Constipation, unspecified constipation type        Dose:  17 g   Take 17 g by mouth daily as needed (constipation)   Quantity:  7 packet   Refills:  0        * QUEtiapine 25 MG tablet   Commonly known  as:  SEROquel   Used for:  Depression, unspecified depression type        Dose:  25 mg   Take 1 tablet (25 mg) by mouth At Bedtime   Quantity:  60 tablet   Refills:  0        * QUEtiapine 25 MG tablet   Commonly known as:  SEROquel   Used for:  Anxiety        Dose:  12.5 mg   Take 0.5 tablets (12.5 mg) by mouth 3 times daily as needed (anxiety)   Quantity:  60 tablet   Refills:  0        ranitidine 150 MG tablet   Commonly known as:  ZANTAC   Used for:  Gastroesophageal reflux disease, esophagitis presence not specified, Esophagitis        Dose:  150 mg   Take 1 tablet (150 mg) by mouth 2 times daily   Quantity:  60 tablet   Refills:  0        senna-docusate 8.6-50 MG per tablet   Commonly known as:  SENOKOT-S;PERICOLACE   Used for:  Constipation, unspecified constipation type        Dose:  1-2 tablet   Take 1-2 tablets by mouth 2 times daily as needed for constipation   Quantity:  100 tablet   Refills:  0        sucralfate 1 GM/10ML suspension   Commonly known as:  CARAFATE   Used for:  Esophagitis        Dose:  1 g   Take 10 mLs (1 g) by mouth 4 times daily (before meals and nightly)   Quantity:  1200 mL   Refills:  0        thiamine 100 MG tablet   Used for:  Alcohol abuse        Dose:  100 mg   Take 1 tablet (100 mg) by mouth daily   Refills:  0        vortioxetine 5 MG tablet   Commonly known as:  TRINTELLIX/BRINTELLIX   Used for:  Depression, unspecified depression type        Dose:  5 mg   Take 1 tablet (5 mg) by mouth daily   Refills:  0        * Notice:  This list has 2 medication(s) that are the same as other medications prescribed for you. Read the directions carefully, and ask your doctor or other care provider to review them with you.      CONTINUE these medications which may have CHANGED, or have new prescriptions. If we are uncertain of the size of tablets/capsules you have at home, strength may be listed as something that might have changed.        Dose / Directions Comments    * GABAPENTIN PO   This  may have changed:  Another medication with the same name was changed. Make sure you understand how and when to take each.        Dose:  600 mg   Take 600 mg by mouth At Bedtime   Refills:  0        * gabapentin 300 MG capsule   Commonly known as:  NEURONTIN   This may have changed:    - medication strength  - when to take this  - reasons to take this  - additional instructions   Used for:  Chronic pain syndrome        Dose:  300 mg   Take 1 capsule (300 mg) by mouth daily   Quantity:  90 capsule   Refills:  0        metoprolol 25 MG 24 hr tablet   Commonly known as:  TOPROL XL   This may have changed:  how much to take   Used for:  PVC's (premature ventricular contractions)        Dose:  12.5 mg   Take 0.5 tablets (12.5 mg) by mouth daily   Quantity:  90 tablet   Refills:  3        mirtazapine 15 MG tablet   Commonly known as:  REMERON   This may have changed:    - medication strength  - how much to take   Used for:  Depression, unspecified depression type        Dose:  15 mg   Take 1 tablet (15 mg) by mouth At Bedtime   Quantity:  30 tablet   Refills:  0        traMADol 50 MG tablet   Commonly known as:  ULTRAM   This may have changed:    - how much to take  - how to take this  - when to take this  - reasons to take this   Used for:  Chronic pain syndrome, Controlled substance agreement signed        Dose:  25 mg   Take 0.5 tablets (25 mg) by mouth 2 times daily as needed for moderate pain TAKE 1 TABLET BY MOUTH TWICE DAILY AS NEEDED FOR MODERATE PAIN   Quantity:  60 tablet   Refills:  0        * Notice:  This list has 2 medication(s) that are the same as other medications prescribed for you. Read the directions carefully, and ask your doctor or other care provider to review them with you.      CONTINUE these medications which have NOT CHANGED        Dose / Directions Comments    CENTRUM SILVER per tablet        Dose:  1 tablet   Take 1 tablet by mouth daily   Refills:  0        metoclopramide 5 MG tablet    Commonly known as:  REGLAN   Used for:  Nausea        Dose:  5 mg   Take 1 tablet (5 mg) by mouth 4 times daily as needed   Quantity:  40 tablet   Refills:  0        valACYclovir 1000 mg tablet   Commonly known as:  VALTREX   Used for:  Herpes simplex virus infection        TAKE 2 TABS EVERY 12 HRS FOR 1 DAY ONLY FOR OUTBREAKS OF COLD SORES as needed   Quantity:  4 tablet   Refills:  3          STOP taking     aspirin 81 MG tablet           atorvastatin 40 MG tablet   Commonly known as:  LIPITOR           cephALEXin 500 MG capsule   Commonly known as:  KEFLEX           furosemide 20 MG tablet   Commonly known as:  LASIX           minocycline 100 MG capsule   Commonly known as:  MINOCIN/DYNACIN           naproxen 500 MG tablet   Commonly known as:  NAPROSYN           Potassium Chloride ER 20 MEQ Tbcr           temazepam 15 MG capsule   Commonly known as:  RESTORIL           traZODone 100 MG tablet   Commonly known as:  DESYREL                   After Care     Activity - Up with nursing assistance           Advance Diet as Tolerated       Follow this diet upon discharge: 2g salt       Daily weights       Call Provider for weight gain of more than 2 pounds per day or 5 pounds per week.       General info for SNF       Length of Stay Estimate: Short Term Care: Estimated # of Days <30  Condition at Discharge: Improving  Level of care:skilled   Rehabilitation Potential: Good  Admission H&P remains valid and up-to-date: Yes  Recent Chemotherapy: N/A  Use Nursing Home Standing Orders: N/A       Mantoux instructions       Give two-step Mantoux (PPD) Per Facility Policy Yes             Referrals     Occupational Therapy Adult Consult       Evaluate and treat as clinically indicated.    Reason:  deconditioning       Physical Therapy Adult Consult       Evaluate and treat as clinically indicated.    Reason:  deconditioning             Follow-Up Appointment Instructions     Follow Up and recommended labs and tests       1.  "Follow up with PCP or facility physician in 5-7 days with repeat CMP and CBC.  2. Follow up with Dr. Graham (gastroenterology) in clinic in 2-4 weeks.  3. Follow up with Dr. Pa (hematology/oncology) in 2-4 weeks.             Your next 10 appointments already scheduled     Jun 16, 2017  4:15 PM CDT   New Visit with Nayeli Bartholomew MD   [facility] ([email or web address])    [address] [place] [address] [phone]              Statement of Approval     Ordered          06/16/17 0927  I have reviewed and agree with all the recommendations and orders detailed in this document.  EFFECTIVE NOW     Approved and electronically signed by:  Nan Macias DO                                                 INTERAGENCY TRANSFER FORM - NURSING   6/6/2017                       [place] [address] MEDICAL SPECIALTY UNIT: [phone]            Attending Provider: Enedelia Rangel MD     Allergies:  Bactrim [Sulfamethoxazole W/trimethoprim], Codeine, Morphine    Infection:  None   Service:  HOSPITALIST    Ht:  1.626 m (5' 4\")   Wt:  90 kg (198 lb 6.6 oz)   Admission Wt:  81.6 kg (180 lb)    BMI:  34.06 kg/m 2   BSA:  2.02 m 2            Advance Directives        Does patient have a scanned Advance Directive/ACP document in EPIC?           No        Immunizations     Name Date      Influenza (High Dose) 3 valent vaccine 10/06/16     Influenza (High Dose) 3 valent vaccine 02/07/14     Influenza (IIV3) 10/11/12     Influenza (IIV3) 02/10/12     Influenza (IIV3) 10/20/10     Influenza (IIV3) 09/21/09     Influenza (IIV3) 12/29/08     Influenza (IIV3) 12/03/04     Influenza (IIV3) 12/03/03     Influenza (IIV3) 11/14/02     Influenza Vaccine IM 3yrs+ 4 Valent IIV4 10/08/15     Mantoux 01/29/16     Mantoux 01/14/09     Mantoux 06/04/07     Mantoux 04/24/06     Mantoux 04/06/05     Mantoux 04/26/04     Pneumococcal (PCV 13) 07/02/15     " "Pneumococcal 23 valent 12/29/08     TD (ADULT, 7+) 04/28/04     TDAP Vaccine (Adacel) 07/10/14       ASSESSMENT     Discharge Profile Flowsheet     EXPECTED DISCHARGE     Patient's communication style  spoken language (English or Bilingual) 06/07/17 0249    Expected Discharge Date  -- (6/16-6/17 TCU) 06/15/17 1510   SKIN      DISCHARGE NEEDS ASSESSMENT     Inspection  Partial (identify areas NOT inspected) 06/16/17 0216    Equipment Currently Used at Home  none (walker, cane, walking stick; 4WW) 06/10/17 1019   Skin WDL  ex 06/16/17 0216    Transportation Available  car;family or friend will provide 06/10/17 1019   Skin Color/Characteristics  bruised (ecchymotic);yellow 06/16/17 0216    GASTROINTESTINAL (ADULT,PEDIATRIC,OB)     Skin Temperature  warm 06/16/17 0216    GI WDL  WDL 06/16/17 0216   Skin Integrity  bruise(s) 06/16/17 0216    Abdominal Appearance  obese 06/16/17 0216   Skin areas NOT inspected  Buttock, left;Buttock, right;Sacrum;Coccyx 06/16/17 0216    Last Bowel Movement  06/15/17 06/15/17 1748   SAFETY      Passing flatus  yes 06/16/17 0216   Safety WDL  WDL 06/16/17 0216    COMMUNICATION ASSESSMENT                        Assessment WDL (Within Defined Limits) Definitions           Safety WDL     Effective: 09/28/15    Row Information: <b>WDL Definition:</b> Bed in low position, wheels locked; call light in reach; upper side rails up x 2; ID band on<br> <font color=\"gray\"><i>Item=AS safety wdl>>List=AS safety wdl>>Version=F14</i></font>      Skin WDL     Effective: 09/28/15    Row Information: <b>WDL Definition:</b> Warm; dry; intact; elastic; without discoloration; pressure points without redness<br> <font color=\"gray\"><i>Item=AS skin wdl>>List=AS skin wdl>>Version=F14</i></font>      Vitals     Vital Signs Flowsheet     VITAL SIGNS     Pain Intervention(s)  Heat applied 06/16/17 1004    Temp  98.4  F (36.9  C) 06/16/17 0733   Response to Interventions  Absence of nonverbal indicators of pain 06/16/17 " "0450    Temp src  Oral 06/16/17 0733   ANALGESIA SIDE EFFECTS MONITORING      Resp  18 06/16/17 0730   Side Effects Monitoring: Respiratory Quality  R 06/16/17 0450    Pulse  86 06/15/17 2036   Side Effects Monitoring: Respiratory Depth  N 06/16/17 0450    Heart Rate  85 06/16/17 0730   Side Effects Monitoring: Sedation Level  S 06/16/17 0450    Pulse/Heart Rate Source  Monitor 06/16/17 0730   HEIGHT AND WEIGHT      BP  131/80 06/16/17 0730   Height  1.626 m (5' 4\") 06/06/17 1419    BP Location  Left arm 06/16/17 0730   Height Method  Stated 06/06/17 1419    OXYGEN THERAPY     Weight  90 kg (198 lb 6.6 oz) 06/16/17 0703    SpO2  96 % 06/16/17 0730   BSA (Calculated - sq m)  1.92 06/06/17 1419    O2 Device  None (Room air) 06/16/17 0730   BMI (Calculated)  30.96 06/06/17 1419    Oxygen Delivery  2 LPM 06/13/17 0825   POSITIONING      PAIN/COMFORT     Body Position  independently positioning 06/16/17 1004    Patient Currently in Pain  yes 06/16/17 1004   Head of Bed (HOB)  HOB at 20-30 degrees 06/16/17 1004    Preferred Pain Scale  number (Numeric Rating Pain Scale) 06/16/17 1004   Positioning/Transfer Devices  pillows;in use 06/16/17 1004    Patient's Stated Pain Goal  No pain 06/16/17 0307   Chair  Shifted weight 06/16/17 1004    0-10 Pain Scale  6 06/16/17 1004   DAILY CARE      Pain Location  Hip (lower back) 06/16/17 1004   Activity Type  ambulated to bathroom 06/16/17 1005    Pain Orientation  Left 06/16/17 1004   Activity Level of Assistance  assistance, stand-by 06/16/17 1005    Pain Descriptors  Aching 06/16/17 1004   Activity Assistive Device  gait belt;walker 06/16/17 0057    Pain Management Interventions  heat applied 06/16/17 1004                 Patient Lines/Drains/Airways Status    Active LINES/DRAINS/AIRWAYS     Name: Placement date: Placement time: Site: Days: Last dressing change:    Closed/Suction Drain Abdomen 04/06/12      Abdomen   1897     Peripheral IV 09/08/16 Right Hand 09/08/16   1017   " Hand   281     Peripheral IV 06/14/17 Right Lower forearm 06/14/17   2338   Lower forearm   1     Incision/Surgical Site 04/06/12 Bilateral Breast 04/06/12 1897     Incision/Surgical Site 04/06/12 Abdomen 04/06/12       1897     Incision/Surgical Site Bilateral Other (Comment)             Incision/Surgical Site 06/07/12 Left Breast 06/07/12   1438    1834             Patient Lines/Drains/Airways Status    Active PICC/CVC     None            Intake/Output Detail Report     Date Intake       Output Net    Shift P.O. I.V. IV Piggyback Colloid Total Urine Total       Noc 06/14/17 2300 - 06/15/17 0659 -- -- -- -- -- 400 400 -400    Day 06/15/17 0700 - 06/15/17 1459 -- -- -- -- -- 1000 1000 -1000    Keny[place] 06/15/17 1500 - 06/15/17 2259 -- -- -- -- -- 100 100 -100    Noc 06/15/17 2300 - 06/16/17 0659 300 -- -- -- 300 400 400 -100    Day 06/16/17 0700 - 06/16/17 1459 -- -- -- -- -- -- -- 0      Last Void/BM       Most Recent Value    Urine Occurrence 1 at 06/15/2017 1916    Stool Occurrence 1 at 06/15/2017 1916      Case Management/Discharge Planning     Case Management/Discharge Planning Flowsheet     REFERRAL INFORMATION     Expected Discharge Date  -- (6/16-6/17 TCU) 06/15/17 1510    Referral Source  hospital clinician/department (specify) 06/13/17 0900   DISCHARGE PLANNING      New Steerage to  Clinics?  -- (care coordinator) 06/13/17 0900   Transportation Available  car;family or friend will provide 06/10/17 1019    Reason For Consult  facility placement 06/13/17 0900   FINAL RESOURCES      Record Reviewed  clinical discipline documentation 06/13/17 0900   Equipment Currently Used at Home  none (walker, cane, walking stick; 4WW) 06/10/17 1019     Assigned to Case  natasha mata 06/13/17 0900   ABUSE RISK SCREEN      LIVING ENVIRONMENT     QUESTION TO PATIENT:  Has a member of your family or a partner(now or in the past) intimidated, hurt, manipulated, or controlled you in any way?  no 06/07/17 0249     Lives With  significant other;alone (alone during week; significant other there on weekends) 06/10/17 1022   QUESTION TO PATIENT: Do you feel safe going back to the place where you are living?  yes 06/07/17 0249    Living Arrangements  house ([facility]) 06/10/17 1019   OBSERVATION: Is there reason to believe there has been maltreatment of a vulnerable adult (ie. Physical/Sexual/Emotional abuse, self neglect, lack of adequate food, shelter, medical care, or financial exploitation)?  no 06/07/17 0249    Provides Primary Care For  significant other 06/07/17 0249   (R) MENTAL HEALTH SUICIDE RISK      COPING/STRESS     Are you depressed or being treated for depression?  No 06/07/17 0249    Major Change/Loss/Stressor  none 06/07/17 1210   HOMICIDE RISK      EXPECTED DISCHARGE     Homicidal Ideation  no 06/07/17 0249                  [place] [address] MEDICAL SPECIALTY UNIT: [phone]            Medication Administration Report for Kavitha Headley as of 06/16/17 1105   Legend:    Given Hold Not Given Due Canceled Entry Other Actions    Time Time (Time) Time  Time-Action       Inactive    Active    Linked        Medications 06/10/17 06/11/17 06/12/17 06/13/17 06/14/17 06/15/17 06/16/17    folic acid (FOLVITE) tablet 1 mg  Dose: 1 mg Freq: DAILY Route: PO  Start: 06/06/17 2115    0910 (1 mg)-Given        0934 (1 mg)-Given        0935 (1 mg)-Given        0859 (1 mg)-Given        0850 (1 mg)-Given        0957 (1 mg)-Given        0825 (1 mg)-Given           gabapentin (NEURONTIN) capsule 300 mg  Dose: 300 mg Freq: DAILY Route: PO  Start: 06/07/17 0900    0910 (300 mg)-Given        0934 (300 mg)-Given        0934 (300 mg)-Given        0857 (300 mg)-Given        0850 (300 mg)-Given        0957 (300 mg)-Given        0825 (300 mg)-Given           gabapentin (NEURONTIN) tablet 600 mg  Dose: 600 mg Freq: AT BEDTIME Route: PO  Start: 06/06/17 2200    2210 (600 mg)-Given        2154 (600 mg)-Given        2209 (600 mg)-Given      "   2233 (600 mg)-Given        2204 (600 mg)-Given        2143 (600 mg)-Given        [ ] 2200           lactulose (CHRONULAC) solution 10 g  Dose: 10 g Freq: 2 TIMES DAILY Route: PO  Start: 06/13/17 0900       0900 (10 g)-Given       2035 (10 g)-Given        0852 (10 g)-Given       2204 (10 g)-Given        0956 (10 g)-Given       2143 (10 g)-Given        0826 (10 g)-Given       [ ] 2100           lidocaine (LMX4) cream  Freq: EVERY 1 HOUR PRN Route: Top  PRN Reason: pain  PRN Comment: with VAD insertion or accessing implanted port.  Start: 06/06/17 2054   Admin Instructions: Do NOT give if patient has a history of allergy to any local anesthetic or any \"rubia\" product.   Apply 30 minutes prior to VAD insertion or port access.  MAX Dose:  2.5 g (  of 5 g tube)               lidocaine 1 % 0.5-5 mL  Dose: 0.5-5 mL Freq: EVERY 1 HOUR PRN Route: OTHER  PRN Comment: mild pain with VAD insertion or accessing implanted port.  Start: 06/14/17 1111   Admin Instructions: Do NOT give if patient has a history of allergy to any local anesthetic or any \"rubia\" product. MAX dose 1 mL subcutaneous OR intradermal in divided doses.         (1200)-Not Given             lidocaine 1 % 1 mL  Dose: 1 mL Freq: EVERY 1 HOUR PRN Route: OTHER  PRN Comment: mild pain with VAD insertion or accessing implanted port  Start: 06/06/17 2054   Admin Instructions: Do NOT give if patient has a history of allergy to any local anesthetic or any \"rubia\" product. MAX dose 1 mL subcutaneous OR intradermal in divided doses.               magnesium oxide (MAG-OX) tablet 400 mg  Dose: 400 mg Freq: DAILY Route: PO  Start: 06/10/17 1445    1635 (400 mg)-Given        0934 (400 mg)-Given        0935 (400 mg)-Given        0858 (400 mg)-Given        0849 (400 mg)-Given        0957 (400 mg)-Given        0825 (400 mg)-Given           magnesium sulfate 4 g in 100 mL sterile water (premade)  Dose: 4 g Freq: EVERY 4 HOURS PRN Route: IV  PRN Reason: magnesium " supplementation  Start: 06/07/17 1030   Admin Instructions: For serum Mg++ less than 1.6 mg/dL  Give 4 g and recheck magnesium level 2 hours after dose, and next AM.               metoclopramide (REGLAN) injection 5 mg  Dose: 5 mg Freq: EVERY 6 HOURS PRN Route: IV  PRN Reasons: nausea,vomiting  Start: 06/15/17 0255   Admin Instructions: Avoid use if patient has full bowel obstruction or perforation.  Irritant.          0315 (5 mg)-Given            metoprolol (TOPROL-XL) half-tab 12.5 mg  Dose: 12.5 mg Freq: DAILY Route: PO  Start: 06/10/17 0900   Admin Instructions: DO NOT CRUSH. Tablet may be split in half along score line; hold it if SBP<110 or HR<55     0910 (12.5 mg)-Given        0933 (12.5 mg)-Given        0936 (12.5 mg)-Given        0858 (12.5 mg)-Given        0856 (12.5 mg)-Given        0959-Hold        0825 (12.5 mg)-Given           miconazole (MICATIN; MICRO GUARD) 2 % powder  Freq: EVERY 1 HOUR PRN Route: Top  PRN Reason: other  PRN Comment: topical candidiasis  Start: 06/13/17 2030   Admin Instructions: Apply to affected area.          2233 ( )-Given              mirtazapine (REMERON) tablet 15 mg  Dose: 15 mg Freq: AT BEDTIME Route: PO  Start: 06/15/17 2200         2143 (15 mg)-Given        [ ] 2200           multivitamin, therapeutic (THERA-VIT) tablet 1 tablet  Dose: 1 tablet Freq: DAILY Route: PO  Start: 06/07/17 0900    0911 (1 tablet)-Given        0934 (1 tablet)-Given        0935 (1 tablet)-Given        0859 (1 tablet)-Given        0850 (1 tablet)-Given        0957 (1 tablet)-Given        0825 (1 tablet)-Given           naloxone (NARCAN) injection 0.1-0.4 mg  Dose: 0.1-0.4 mg Freq: EVERY 2 MIN PRN Route: IV  PRN Reason: opioid reversal  Start: 06/06/17 2053   Admin Instructions: For respiratory rate LESS than or EQUAL to 8.  Partial reversal dose:  0.1 mg titrated q 2 minutes for Analgesia Side Effects Monitoring Sedation Level of 3 (frequently drowsy, arousable, drifts to sleep during  conversation).Full reversal dose:  0.4 mg bolus for Analgesia Side Effects Monitoring Sedation Level of 4 (somnolent, minimal or no response to stimulation).               ondansetron (ZOFRAN-ODT) ODT tab 4 mg  Dose: 4 mg Freq: EVERY 6 HOURS PRN Route: PO  PRN Reason: nausea  Start: 06/06/17 2056   Admin Instructions: This is Step 1 of nausea and vomiting management.  If nausea not resolved in 15 minutes, go to Step 2 prochlorperazine (COMPAZINE). Do not push through foil backing. Peel back foil and gently remove. Place on tongue immediately. Administration with liquid unnecessary              Or  ondansetron (ZOFRAN) injection 4 mg  Dose: 4 mg Freq: EVERY 6 HOURS PRN Route: IV  PRN Reasons: nausea,vomiting  Start: 06/06/17 2056   Admin Instructions: This is Step 1 of nausea and vomiting management.  If nausea not resolved in 15 minutes, go to Step 2 prochlorperazine (COMPAZINE).  Irritant.               polyethylene glycol (MIRALAX/GLYCOLAX) Packet 17 g  Dose: 17 g Freq: DAILY PRN Route: PO  PRN Comment: constipation  Start: 06/15/17 1156   Admin Instructions: 1 Packet = 17 grams. Mixed prescribed dose in 8 ounces of water. Follow with 8 oz. of water.               potassium chloride (KLOR-CON) Packet 20-40 mEq  Dose: 20-40 mEq Freq: EVERY 2 HOURS PRN Route: ORAL OR FEED  PRN Reason: potassium supplementation  Start: 06/07/17 1030   Admin Instructions: Use if unable to tolerate tablets.  If Serum K+ 3.0-3.3, dose = 60 mEq po total dose (40 mEq x1 followed in 2 hours by 20 mEq x1). Recheck K+ level 4 hours after dose and the next AM.  If Serum K+ 2.5-2.9, dose = 80 mEq po total dose (40 mEq Q2H x2). Recheck K+ level 4 hours after dose and the next AM.  If Serum K+ less than 2.5, See IV order.  Dissolve packet contents in 4-8 ounces of cold water or juice.               potassium chloride 10 mEq in 100 mL intermittent infusion  Dose: 10 mEq Freq: EVERY 1 HOUR PRN Route: IV  PRN Reason: potassium  supplementation  Start: 06/07/17 1030   Admin Instructions: Infuse via PERIPHERAL LINE or CENTRAL LINE. Use for central line replacement if patient weight less than 65 kg, if patient is on TPN with high potassium content or if unit does not stock 20 mEq bags.   If Serum K+ 3.0-3.3, dose = 10 mEq/hr x4 doses (40 mEq IV total dose). Recheck K+ level 2 hours after dose and the next AM.   If Serum K+ less than 3.0, dose = 10 mEq/hr x6 doses (60 mEq IV total dose). Recheck K+ level 2 hours after dose and the next AM.               potassium chloride 10 mEq in 100 mL intermittent infusion with 10 mg lidocaine  Dose: 10 mEq Freq: EVERY 1 HOUR PRN Route: IV  PRN Reason: potassium supplementation  Start: 06/07/17 1030   Admin Instructions: Infuse via PERIPHERAL LINE. Use potassium with lidocaine for pain with peripheral administration.  If Serum K+ 3.0-3.3, dose = 10 mEq/hr x4 doses (40 mEq IV total dose). Recheck K+ level 2 hours after dose and the next AM.  If Serum K+ less than 3.0, dose = 10 mEq/hr x6 doses (60 mEq IV total dose). Recheck K+ level 2 hours after dose and the next AM.               potassium chloride 20 mEq in 50 mL intermittent infusion  Dose: 20 mEq Freq: EVERY 1 HOUR PRN Route: IV  PRN Reason: potassium supplementation  Start: 06/07/17 1030   Admin Instructions: Infuse via CENTRAL LINE Only. May need EKG if less than 65 kg or on TPN - Max rate is 0.3 mEq/kg/hr for patients not on EKG monitoring.   If Serum K+ 3.0-3.3, dose = 20 mEq/hr x2 doses (40 mEq IV total dose). Recheck K+ level 2 hours after dose and the next AM.  If Serum K+ less than 3.0, dose = 20 mEq/hr x3 doses (60 mEq IV total dose). Recheck K+ level 2 hours after dose and the next AM.               potassium chloride SA (K-DUR/KLOR-CON M) CR tablet 20-40 mEq  Dose: 20-40 mEq Freq: EVERY 2 HOURS PRN Route: PO  PRN Reason: potassium supplementation  Start: 06/07/17 1030   Admin Instructions: Use if able to take PO.   If Serum K+ 3.0-3.3, dose  = 60 mEq po total dose (40 mEq x1 followed in 2 hours by 20 mEq x1). Recheck K+ level 4 hours after dose and the next AM.  If Serum K+ 2.5-2.9, dose = 80 mEq po total dose (40 mEq Q2H x2). Recheck K+ level 4 hours after dose and the next AM.  If Serum K+ less than 2.5, See IV order.  DO NOT CRUSH               QUEtiapine (SEROquel) half-tab 12.5 mg  Dose: 12.5 mg Freq: 3 TIMES DAILY PRN Route: PO  PRN Comment: anxiety  Start: 06/15/17 0742          0305 (12.5 mg)-Given           QUEtiapine (SEROquel) tablet 25 mg  Dose: 25 mg Freq: AT BEDTIME Route: PO  Start: 06/15/17 2200         2143 (25 mg)-Given        [ ] 2200           ranitidine (ZANTAC) tablet 150 mg  Dose: 150 mg Freq: 2 TIMES DAILY Route: PO  Start: 06/11/17 2100     2154 (150 mg)-Given        0935 (150 mg)-Given       2209 (150 mg)-Given        0858 (150 mg)-Given       2035 (150 mg)-Given        0850 (150 mg)-Given       2204 (150 mg)-Given        0957 (150 mg)-Given       2143 (150 mg)-Given        0825 (150 mg)-Given       [ ] 2100           senna-docusate (SENOKOT-S;PERICOLACE) 8.6-50 MG per tablet 1-2 tablet  Dose: 1-2 tablet Freq: 2 TIMES DAILY PRN Route: PO  PRN Reason: constipation  Start: 06/15/17 1156              sodium chloride (PF) 0.9% PF flush 10-20 mL  Dose: 10-20 mL Freq: EVERY 1 HOUR PRN Route: IK  PRN Reasons: line flush,post meds or blood draw  PRN Comment: to flush each peripheral midline IV catheter lumen  Start: 06/14/17 1111   Admin Instructions: 10 mL post IV meds;  20 mL post blood draw         1200 (20 mL)-Given             sodium chloride (PF) 0.9% PF flush 3 mL  Dose: 3 mL Freq: EVERY 8 HOURS Route: IK  Start: 06/06/17 2115   Admin Instructions: And Q1H PRN, to lock peripheral IV dormant line.     0915 (3 mL)-Given       1642 (3 mL)-Given       (2334)-Not Given        (0951)-Not Given       1600 (3 mL)-Given        (0137)-Not Given       0937 (3 mL)-Given       1600 (3 mL)-Given During Downtime       2338 (3 mL)-Given         0900 (3 mL)-Given       1615 (3 mL)-Given        0521 (3 mL)-Given       0859 (3 mL)-Given       1639 (3 mL)-Given        0118 (3 mL)-Given       0856 (3 mL)-Given       1658 (3 mL)-Given        0044 (3 mL)-Given       0831 (3 mL)-Given       [ ] 1600           sodium chloride (PF) 0.9% PF flush 3 mL  Dose: 3 mL Freq: EVERY 1 HOUR PRN Route: IK  PRN Reason: line flush  PRN Comment: for peripheral IV flush post IV meds  Start: 06/06/17 2054    2218 (3 mL)-Given                 sucralfate (CARAFATE) suspension 1 g  Dose: 1 g Freq: 4 TIMES DAILY BEFORE MEALS & NIGHTLY Route: PO  Start: 06/12/17 2200   Admin Instructions: Shake well. Recommended to take before meals.       2209 (1 g)-Given        0637 (1 g)-Given       1207 (1 g)-Given       1622 (1 g)-Given       2233 (1 g)-Given        0852 (1 g)-Given       (1326)-Not Given [C]       1638 (1 g)-Given       2204 (1 g)-Given        0856 (1 g)-Given       1145 (1 g)-Given       1658 (1 g)-Given       2144 (1 g)-Given        0649 (1 g)-Given       [ ] 1130       [ ] 1630       [ ] 2200           thiamine tablet 100 mg  Dose: 100 mg Freq: DAILY Route: PO  Start: 06/10/17 1445    1635 (100 mg)-Given        0950 (100 mg)-Given        0935 (100 mg)-Given        0859 (100 mg)-Given        0850 (100 mg)-Given        0957 (100 mg)-Given        0825 (100 mg)-Given           traMADol (ULTRAM) half-tab 25 mg  Dose: 25 mg Freq: 2 TIMES DAILY PRN Route: PO  PRN Reason: moderate pain  Start: 06/15/17 0743          0305 (25 mg)-Given           vortioxetine (TRINTELLIX/BRINTELLIX) tablet 5 mg  Dose: 5 mg Freq: DAILY Route: PO  Start: 06/09/17 1115    0910 (5 mg)-Given        0934 (5 mg)-Given        0935 (5 mg)-Given        0858 (5 mg)-Given        0849 (5 mg)-Given        0957 (5 mg)-Given        0826 (5 mg)-Given          Completed Medications  Medications 06/10/17 06/11/17 06/12/17 06/13/17 06/14/17 06/15/17 06/16/17         Dose: 25 g Freq: 2 TIMES DAILY Route: IV  Last Dose: 25 g  (06/13/17 1615)  Start: 06/13/17 1415   End: 06/14/17 1639   Admin Instructions: Infusion rates should be adjusted based on patient condition and response.  - For shock/hypovolemia, infuse as rapidly as tolerated. For patients with cardiac or circulatory disease at risk for rapid blood pressure increases, do not exceed 5-10 mL/min.   - For patients with normal plasma volume, do not exceed 1-2 mL/min to avoid circulatory overload and pulmonary edema.   - For procedure specific rates, please refer to procedure protocol.        1615 (25 g)-New Bag        1214 (25 g)-New Bag [C]       1639 (25 g)-New Bag               Dose: 300 mg Freq: EVERY 8 HOURS SCHEDULED Route: PO  Indications of Use: SKIN AND SOFT TISSUE INFECTION  Start: 06/09/17 1400   End: 06/13/17 2233    0559 (300 mg)-Given       1317 (300 mg)-Given       2210 (300 mg)-Given        0630 (300 mg)-Given       1433 (300 mg)-Given       2154 (300 mg)-Given        0610 (300 mg)-Given       1829 (300 mg)-Given [C]        0137 (300 mg)-Given              1104 (300 mg)-Given       1424 (300 mg)-Given       2233 (300 mg)-Given                Dose: 20 mg Freq: 2 TIMES DAILY. Route: IV  Start: 06/13/17 1415   End: 06/14/17 1638       1425 (20 mg)-Given        0857 (20 mg)-Given       1638 (20 mg)-Given               Dose: 50 mL Freq: ONCE Route: PO  Start: 06/14/17 1015   End: 06/14/17 1227   Admin Instructions: Mix 50mL bottle of Omnipaque with 600mL of water. Patient will drink 325mL of the mixture. 1 hour later, patient will drink the remaining 325mL of mixture. Scan will be 2 hours after the first dose. Patient will need to be NPO once they start drinking the prep.         1227 (50 mL)-Given               Dose: 93 mL Freq: ONCE Route: IV  Start: 06/14/17 1630   End: 06/14/17 1659        1659 (93 mL)-Given               Dose: 68 mL Freq: ONCE Route: IV  Start: 06/14/17 1630   End: 06/14/17 1659   Admin Instructions: This entry is for use by Radiology to  intermittently used as a flush in patients receiving a CT scan.         1659 (68 mL)-Given            Discontinued Medications  Medications 06/10/17 06/11/17 06/12/17 06/13/17 06/14/17 06/15/17 06/16/17         Dose: 0.5-1 mg Freq: EVERY 2 HOURS PRN Route: IV  PRN Reason: anxiety  Start: 06/15/17 0256   End: 06/15/17 1158   Admin Instructions: For IV PUSH: Dilute with equal volume of NS.          0318 (1 mg)-Given       1158-Med Discontinued          Dose: 1-2 mg Freq: EVERY 30 MIN PRN Route: PO  PRN Reason: other  PRN Comment: per CIWA-Ar score  Start: 06/08/17 1409   End: 06/14/17 0949   Admin Instructions: Oral dosing is the preferred route of administration.  Dose according to CIWA-Ar score:    For CIWA-Ar Score LESS THAN OR EQUAL TO 7:  ~ NO LORazepam (ATIVAN) is to be administered and  ~ repeat CIWA-Ar scale in 4 hours and PRN    For CIWA-Ar Score 8-12:   ~ give LORazepam (ATIVAN) 1 mg PO or IV and  ~ repeat CIWA-Ar scale in 1 hour     For CIWA-Ar Score 13-15:  ~ give LORazepam (ATIVAN) 2 mg PO or IV   ~ and repeat CIWA-Ar scale in 1 hour    For CIWA-Ar Score GREATER THAN OR EQUAL TO 16:   ~ give LORazepam (ATIVAN) 2 mg PO or IV and   ~ repeat CIWA-Ar scale in 30 minutes    Doses should be withheld for nystagmus, sedation, ataxia, dysarthria or respiratory rate less than 12. If dose is being held more than once, provider must be notified.      1003 (1 mg)-Given          0519 (1 mg)-Given       0949-Med Discontinued        Or    Dose: 1-2 mg Freq: EVERY 30 MIN PRN Route: IV  PRN Reason: other  PRN Comment: per CIWA-Ar score  Start: 06/08/17 1409   End: 06/14/17 0949   Admin Instructions: Oral dosing is the preferred route of administration.    Dose according to CIWA-Ar Score:    For CIWA-Ar Score LESS THAN OR EQUAL TO 7:   ~ NO LORazepam (ATIVAN) is to be administered  and  ~ repeat CIWA-Ar scale in 4 hours and PRN    For CIWA-Ar Score 8-12:  ~ give LORazepam (ATIVAN) 1 mg PO or IV and  ~ repeat CIWA-AR scale  in 1 hour     For CIWA-Ar Score 13-15:  ~ give LORazepam (ATIVAN) 2 mg PO or IV and   ~ repeat CIWA-Ar scale in 1 hour    For CIWA-Ar Score GREATER THAN OR EQUAL TO 16:  ~ give LORazepam (ATIVAN) 2 mg PO or IV and  ~ repeat CIWA-Ar scale in 30 minutes    Doses should be withheld for nystagmus, sedation, ataxia, dysarthria or respiratory rate less than 12. If dose is being held more than once, provider must be notified.  For IV PUSH: Dilute with equal volume of NS.                       0949-Med Discontinued           Dose: 45 mg Freq: AT BEDTIME Route: PO  Start: 06/06/17 2200   End: 06/15/17 0741    2210 (45 mg)-Given        2154 (45 mg)-Given        2209 (45 mg)-Given        2233 (45 mg)-Given        2204 (45 mg)-Given        0741-Med Discontinued          Dose: 10 mL Freq: EVERY 8 HOURS Route: IK  Start: 06/14/17 1115   End: 06/15/17 1015   Admin Instructions: And Q1H PRN, to lock peripheral midline IV catheter dormant lumen. Recommended to flush Q8H each lumen even during infusions         1330 (10 mL)-Given       (2204)-Not Given [C]               1015-Med Discontinued          Dose: 7.5 mg Freq: AT BEDTIME PRN Route: PO  PRN Reason: sleep  Start: 06/12/17 0912   End: 06/15/17 0741   Admin Instructions: May repeat x 1    Dose adjustment for patient age per protocol        2236 (7.5 mg)-Given        2204 (7.5 mg)-Given        0741-Med Discontinued          Dose: 50 mg Freq: 2 TIMES DAILY PRN Route: PO  PRN Reason: moderate pain  Start: 06/06/17 2051   End: 06/15/17 0743     2154 (50 mg)-Given        0943 (50 mg)-Given         1738 (50 mg)-Given        0110 (50 mg)-Given       0743-Med Discontinued          Dose: 100 mg Freq: AT BEDTIME Route: PO  Start: 06/06/17 2200   End: 06/15/17 0741    2210 (100 mg)-Given        2154 (100 mg)-Given        2209 (100 mg)-Given        2233 (100 mg)-Given        2204 (100 mg)-Given        0741-Med Discontinued     Medications 06/10/17 06/11/17 06/12/17 06/13/17 06/14/17  06/15/17 06/16/17               INTERAGENCY TRANSFER FORM - NOTES (H&P, Discharge Summary, Consults, Procedures, Therapies)   6/6/2017                       [place] [address] [facility]: [phone]               History & Physicals      H&P signed by Enedelia Rangel MD at 6/8/2017  4:38 PM      Author:  Enedelia Rangel MD Service:  Hospitalist Author Type:  Physician    Filed:  6/8/2017  4:38 PM Date of Service:  6/7/2017  1:16 AM Creation Time:  6/7/2017  4:25 AM    Status:  Signed :  Enedelia Rangel MD (Physician)         DATE OF ADMISSION:  06/06/2017.      PRIMARY CARE PHYSICIAN:  Dr. Alison Pena.      CHIEF COMPLAINT:  Bilateral lower extremity swelling and redness.      HISTORY OF PRESENT ILLNESS:  Had been obtained from the patient who is a relatively good historian.  I also discussed with ER attending and I reviewed her chart.      Ms. Kavitha Hartman is a pleasant 71-year-old female with past medical history of hypertension, dyslipidemia, obesity status post gastric bypass, alcohol abuse, chronic pain syndrome on a pain agreement, history of left lower extremity cellulitis, history of macrocytic anemia, moderate aortic stenosis, chronic kidney disease stage III, history of supraventricular tachycardia, depression/anxiety, who came in for evaluation of bilateral lower extremity swelling and erythema.  She reports that usually her legs are not swollen, although she does take Lasix at home p.r.n. for leg swelling.  She states that she started having more swelling in her lower extremities 2 weeks ago.  Initially it was more obvious on left lower extremity.  Then, more obvious on the right lower extremity.  She went to see her primary care physician for this.  It seems that on 05/26 she was started on clindamycin. Apparently she started having nausea and diarrhea while she was on clindamycin, so when she saw her primary care physician again on 05/30, the clindamycin was  stopped and she was started on minocycline.  On 06/02, she was seen again in her primary care physician's office and she was told to start taking Keflex as well in addition to minocycline.  Despite all these different antibiotics, she continued to have lower extremity swelling, redness and some warmth reportedly.  She states that for the last few days she started taking Lasix 20 mg daily, but despite this, swelling again did not improve.  Upon further questioning, she denies any fever at home.  She denies any chest pain.  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  She also reports some weight gain of 8 pounds in the last 8 weeks or so.  She denies any headache.  She did have some diarrhea while she was on clindamycin, now no more diarrhea, just some loose stools, no bowel movement today.  She denies any dysuria.      In the ER, the patient was seen by Dr. Martínez.  Her initial vitals showed blood pressure of 138/81, heart rate was 96, respiratory rate 18, oxygen saturation 96% on room air, and T-max in the ER was 99.1.  She did have basic blood work which showed pretty significant abnormalities with BMP with low sodium of 129, potassium of 2.5, chloride 18, bicarbonate of 31, BUN 20, creatinine 1.02, anion gap of 18.  Her LFTs with albumin 2.6, total protein 6, total bilirubin 2.3, alkaline phosphatase 199, , AST elevated at 338, glucose 99.  CBC with no leukocytosis, normal hemoglobin, hematocrit mildly decreased at 34.8, normal platelet count.  Blood cultures drawn in the ER and pending at this time.  She had an ultrasound of her lower extremities in the ER which was negative for DVT.  In the ER, the patient received 1 dose of vancomycin IV and Hospitalist Service was called regarding the admission.      PAST MEDICAL HISTORY:   1.  Hypertension.   2.  Dyslipidemia.   3.   History of mild coronary artery disease with cardiac catheterization in 2015 showing mild distal branch disease.   4.  History of left lower extremity cellulitis.   5.  History of obesity, status post gastric bypass surgery.   6.  Chronic insomnia.   7.  Chronic lower back pain, on pain agreement.   8.  History of alcohol abuse.   9.  History of hyponatremia.   10.  History of depression/anxiety.   11.  History of moderate aortic stenosis with last echocardiogram in 2015 showing aortic valve area of 1.1 square cm.   12.  History of microcytic anemia.   13.  History of supraventricular tachycardia.   14.  History of chronic kidney disease, stage III.      PAST SURGICAL HISTORY:   1.  Status post gastric bypass surgery in .   2.  Bilateral knee replacement.   3.  Right hip arthroplasty.   4.  Appendectomy.   5.  Mediastinoscopy with mediastinal adenopathy, biopsy.   6.  Bilateral carpal tunnel release.   7.  Cholecystectomy.   8.  Cystocele repair via da Kimber laparoscopic intervention.   9.  Cystoscopy with lithotripsy.   10.  Mammoplasty with breast augmentation, bilateral.   11.  Abdominal laparotomy with lysis of adhesions and ventral hernia repair in 2004.   12.  Vaginal hysterectomy and bilateral salpingo-oophorectomy for a myoma and bleeding.      SOCIAL HISTORY:  The patient lives at home.  She states she never smoked.  She does admit to drinking 2-3 cocktails per night and she denies illicit drug abuse.      FAMILY HISTORY:  Reviewed with the patient, it seems that her mother  because of rare blood disease.  Her father had COPD and lung cancer.  She has 2 sisters and 1 brother apparently in good state of health.  She has 4 children, again in good state of health.      MEDICATIONS PRIOR TO ADMISSION:   1.  Gabapentin 300 mg by mouth daily as needed in the morning.   2.  Gabapentin 600 mg by mouth at bedtime.   3.  Reglan 5 mg by mouth 4 times daily p.r.n.   4.  Recently started minocycline  and Keflex.   5.  Lasix 20 mg by mouth daily p.r.n.   6.  Trazodone 100 mg p.o. at bedtime.   7.  Valtrex 2 tablets by mouth q.12 h. p.r.n. for outbreaks of cold sores.   8.  Temazepam 15 mg by mouth at bedtime as needed for sleep.   9.  Remeron 45 mg p.o. at bedtime.   10.  Tramadol 50 mg by mouth twice daily p.r.n. for moderate pain.   11.  Naproxen 500 mg by mouth twice daily p.r.n. for moderate pain.   12.  Metoprolol 25 mg p.o. daily.   13.  Lipitor 40 mg p.o. daily.   14.  Multivitamin 1 tablet p.o. daily.   15.  Aspirin 81 mg p.o. daily.   16.  Potassium chloride 20 mEq by mouth daily.      ALLERGIES:  She is allergic to Bactrim, codeine and morphine.      REVIEW OF SYSTEMS:  A 10-point review of systems was conducted and it was negative except for pertinent positives mentioned in history of present illness.      PHYSICAL EXAMINATION:   VITAL SIGNS:  Blood pressure is 105/54, heart rate 96, respiratory rate 18, oxygen saturation 97% on room air, temperature 99.1 degrees Fahrenheit.   GENERAL:  The patient is awake, alert, no acute distress at the time of my examination.   HEENT:  Head is normocephalic, atraumatic.  Pupils are equally round and reactive to light.  Oral mucosa is moist.   NECK:  Supple.  No cervical lymphadenopathy, no thyromegaly.   CHEST:  There is bilateral air entry, no wheezing, no rales, no crackles.   CARDIOVASCULAR:  There is normal S1, S2, regular rate and rhythm.  There is a systolic murmur 3/6 intensity, precordial area.  No rubs.   ABDOMEN:  Soft, nontender, nondistended.  Bowel sounds are present.   EXTREMITIES:  There is 1+ pitting indurated edema bilateral lower extremities with some redness extended up to her knees, no blisters, no open wounds, no obvious warmness noted, 2+ peripheral pulses are palpable.   SKIN:  Intact, no cyanosis.  There is some redness on bilateral lower extremities below her knees as mentioned above.   NEUROLOGIC:  The patient is awake, alert, oriented to  self, place and time.  There are no focal neurological deficits.   PSYCHIATRIC:  Normal mood and normal affect.   MUSCULOSKELETAL:  She moves all extremities freely, there are no obvious joint deformities.      DIAGNOSTIC DATA:  Sodium is 129, potassium 2.5 initially, chloride 80, bicarbonate 39, BUN 20, creatinine 1.01, calcium is 8.8, anion gap of 18, albumin 2.6, total protein 6, total bilirubin 2.3, alkaline phosphatase 199, , .  White blood cells 6.3, hemoglobin 12.9, hematocrit 34.8, platelet count 217.  Blood culture is pending at this time.  Ultrasound of lower extremities bilaterally negative for DVT.      ASSESSMENT AND PLAN:  Ms. Kavitha Hartman is a pleasant 71-year-old female with past medical history of hypertension, dyslipidemia, depression/anxiety, alcohol abuse, history of mild coronary artery disease, moderate aortic stenosis, microcytic anemia, chronic back pain with signed pain agreement, history of left lower extremity cellulitis, history of obesity status post bariatric surgery, who came in for evaluation of bilateral lower extremity swelling and erythema with initial concern for cellulitis.   1.  Bilateral lower extremity swelling, concerning for cellulitis, although this might represent venous stasis given the fact that it is bilateral location:  There is some discoloration of the legs, but no obvious warmness noted.  She does not have any fever and no elevated white blood cells.  She was treated with different courses of antibiotics as an outpatient including clindamycin, minocycline and Keflex without significant improvement.  Ultrasound of lower extremities bilaterally was negative for deep venous thrombosis.  The plan for now is to continue with intravenous vancomycin that was started in the emergency room.  We will follow up blood cultures.  We will follow up fever curve and white blood cell trend, and an Infectious Disease consult was requested for the morning.  I will give  her 1 dose of Lasix intravenous 40 mg once tonight and to see if it will help in improving her lower extremity swelling.  I will also order an echocardiogram of the heart to rule out any evidence of congestive heart failure, since she does reports some symptoms concerning for congestive heart failure exacerbation such as recent exertional shortness of breath, some weight gain and bilateral lower extremity swelling.   2.  Hyponatremia with sodium of 129:  I do not see that she had low sodium levels in the past.  This might represent hypovolemic versus euvolemic hyponatremia.  We will plan to check her UA, urine sodium, serum osmolality as well as urine osmolality to differentiate between different causes of hyponatremia.  Since she does seem dry, I do not think she needs intravenous fluids at this time.  As I mentioned above, I will give 1 dose of Lasix 40 mg intravenous and we will plan to repeat sodium in the morning.   3.  Hypokalemia with potassium of 2.5 on admission:  This might be related with recent daily use of Lasix.  She did receive some potassium supplementation in the emergency room, repeated potassium is 3.1.  Since I am planning to give an extra dose of Lasix intravenous tonight, I am anticipating that her potassium will be on lower side as well, so potassium replacement protocol has been ordered.  If patient will be decided to be sent home on daily potassium, she should be on potassium supplementation.   4.  Abnormal liver function tests with elevated , :  Her total bilirubin is also elevated at 2.3.  I think this is related to her daily alcohol drinking.  Will attempt to hold her prior to admission statin, repeat LFTs in the morning and check an ultrasound of right upper quadrant.  If her LFTs are not improving, consider hepatitis serology studies.   5.  History of hypertension:  Blood pressure is well controlled for now, will continue her prior to admission metoprolol with holding  parameters.   6.  Alcohol abuse:  She reports drinking 2-3 cocktails per night.  She denies having history of alcohol withdrawal in the past.  Her current blood work somehow suggests chronic alcoholic use with hyponatremia, hypokalemia, and elevated liver function tests.  We will check her INR as well.  I did advise her to quit drinking.  I did not put her on CIWA protocol for now, will just monitor, but of course if she presents with any evidence of alcohol withdrawal such as shakiness tachycardia, agitation, diaphoresis, we will start CIWA protocol.  I did start her though on folic acid, thiamine and multivitamin.   7.  History of depression/anxiety:  Will continue with her prior to admission Remeron and trazodone.   8.  History of insomnia:  I will continue with her prior to admission temazepam, but I will decrease the dose from 50 mg to 7.5 mg at bedtime p.r.n.   9.  History of chronic pain, on narcotic agreement:  As per her primary care physician, will continue her prior to admission Ultram 50 mg by mouth twice daily p.r.n. as well as her prior to admission gabapentin.   10.  History of moderate aortic stenosis:  Her last echocardiogram was in 08/2015.  She stated that she is due for a new echocardiogram.  I am also wondering if some of her symptoms are related to some degree of congestive heart failure exacerbation, will plan to have an echocardiogram at this time.   11.  History of dyslipidemia:  As mentioned above, plan to hold her prior to admission Lipitor for now given abnormal liver function tests.   12.  Deep venous thrombosis prophylaxis:  I have started her on Lovenox 40 mg subcutaneously daily.   13.  Code status:  Was discussed with the patient and patient is full code.   14.  Disposition:  I anticipate at least 2 days of hospitalization pending clinical improvement.         TOMAS MEDRANO MD             D: 06/07/2017 01:16   T: 06/07/2017 04:24   MT: areli      Name:     DARWIN JASSO   MRN:       [id number]        Account:      [id number]   :      1945           Admitted:     [id number]      Document: [id number]       cc: Alison Pena MD[DN1.1]        Revision History        User Key Date/Time User Provider Type Action    > DN1.1 2017  4:38 PM Enedelia Rangel MD Physician Sign     [N/A] 2017  4:25 AM Enedelia Rangel MD Physician Edit                     Discharge Summaries      Discharge Summaries by Nan Macias DO at 2017  9:27 AM     Author:  Nan Macias DO Service:  Hospitalist Author Type:  Physician    Filed:  2017 10:36 AM Date of Service:  2017  9:27 AM Creation Time:  2017  9:27 AM    Status:  Signed :  Nan Macias DO (Physician)         [facility]    Discharge Summary  Hospitalist    Date of Admission:  2017  Date of Discharge:  2017  Discharging Provider: Nan Macias    Discharge Diagnoses[KW1.1]   Abnl LFTs likely dt alcoholic liver disease  Bilateral LE Cellulitis vs Worsening Chronic Venous Stasis  Chronic Bilateral LE Edema  Acute Kidney Injury, resolved  Thrombocytopenia, improved  EtOH Abuse  Depression / Anxiety  Moderate Aortic Stenosis  Hypertension  Dyslipidemia  Insomnia  Chronic Back Pain[KW1.2]    History of Present Illness[KW1.1]   Kavitha Headley is a 71 year old female with PMHx of hypertension, dyslipidemia, moderate aortic stenosis, depression, anxiety, EtOH use, chronic back pain, recent LE cellulitis and oral antibiotics who was admitted on 2017 with worsening LE erythema and swelling. Additionally, she was found to have abnl LFTs.[KW1.2]    Hospital Course   Kavitha Headley was admitted on 2017.  The following problems were addressed during her hospitalization:[KW1.1]    Abnl LFTs, likely dt alcoholic liver disease:  LFTs elevated on admission. Suspected secondary to acute alcoholic hepatitis, possibly underlying liver disease or  early cirrhosis though underwent additional workup given cholestatic picture (raising concerns for med related vs autoimmune vs obstructive)  RUQ US this stay showed no acute findings other than some fatty infiltration. No nodularities.   Viral hepatitis panel neg, antimitochondiral Ab neg, CMV neg, EBV IgG positive, HSV pending at time of discharge, iron and ferritin nl  Statin held on admission, was initially placed on Ancef as below, though that was subsequently dc'd  GI consulted -- seen by Dr. Graham  Underwent EGD and EUS on 6/12 which showed a friable distal esophagus which was biopsied and neg for neoplastic changes; otherwise had nl appearing intrahepatic biliary ducts, CBD not well visualized, no obvious pancreatic mass  Ammonia was noted to be at the high range of nl -- started on lactulose on 6/13  CT abd/pelvis obtained on 6/14 and showed diffuse fatty infiltration throughout the liver, no biliary duct dilation, no visible pancreatic abnl and some nonspecific inguinal lymphadenopathy    -- LFTs improving at the time of discharge and INR stable (1.03), will need close monitoring given potential for medications to exacerbate abnl LFTs -- should have repeat CMP next week  -- conts on sucralfate and H2 blocker given findings on EGD  -- cont Lactulose  -- needs to abstain from EtOH  -- should follow up with Dr. Graham after discharge      Bilateral LE Cellulitis vs Worsening Chronic Venous Stasis:  Recently treated for cellulitis prior to admission with clindamycin, then switched to minocycline and Keflex dt reported intolerance. On admission, patient endorsed worsening LE erythema and swelling. Was afebrile, CBC nl. Bilateral US neg for DVT.   IV Vancomycin was started on admission -- seen by ID and was changed to Ancef on 6/7, this was ultimately stopped on 6/9, was then transitioned to clindamycin (ID recommended Keflex but this was not used given concerns for exacerbating LFTs)     -- completed course of  antibiotic therapy on 6/13  -- mgmt of LE edema as below     Chronic Bilateral LE Edema:  PTA: Lasix 20mg po daily.   Edema likely dt underlying liver disease and low albumin. No evidence of pulmonary edema.   Echo this stay showed intact EF (>70%), though with impaired LV relaxation, moderate AS and a mildly dilated ascending aorta  Was given dose of IV Lasix on admission -- subsequent doses held given development of LYLY and hyponatremia. Was transiently given IVFs -- these were stopped on 6/11 given worsening of LE edema  Resumed on Lasix with albumin on 6/13 and was given 3 doses (given over the course of 6/13 - 6/14), renal function tolerated.     -- additional Lasix was not prescribed at discharge but may need to be considered in the future  -- recommended to continue to keep legs elevated, follow low Na diet, cont nutritional supplements      Acute Kidney Injury: Resolved  Cr reportedly normal prior to admission. Peaked at 1.4 this stay after initial diuresis  Seen by nephrology this stay -- felt she was intravascularly volume depleted and was started on NS.   Cr improved on NS but edema worsening, IVFs dc'd 6/11.  Given Lasix and albumin from 6/13 - 6/14, renal function remained stable     -- renal function remained stable at time of discharge  -- if considering resumption of Lasix in the future will need close monitoring of renal function     Thrombocytopenia:  Etiology not completely clear -- thought to be possibly secondary to underlying liver disease, antibiotic use, EtOH and low grade DIC  Platelet count dropped as low as 51 on 6/12, has since started to improve  Peripheral smear showed macrocytosis and moderate thrombocytopenia, no blasts  Lovenox dc'd, HIT Ab panel neg  Had associated elevated d-dimer, low fibrinogen and mildly elevated INR -- clinical picture concerning for possible mild DIC, discussed w/Dr. Pa, recommended BM biopsy if platelet count conts to drop  GI suspects that liver disease  is the underlying culprit    -- platelet count was improving at the time of discharge[KW1.2] (100-110)[KW1.3], should have repeat CBC next week  -- recommended to follow up with Dr. Pa      EtOH Abuse:  Reported drinking 2-3 cocktails/night. Denied prior hx of withdrawal. Workup this stay suggestive of chronic EtOH use.   Was notably tremulous and unsteady on 6/8 and started on CIWA protocol -- no overt signs of withdrawal so this was dc'd.      -- psych following this stay -- recommended hospital-based inpatient stay for ongoing care, best option may be [facility] -- patient will pursue this further after completion of rehab stay and improvement in strength  -- cont thiamine/folate/MVI     Depression / Anxiety:  Psych following this stay.   Started on Trintellex and Seroquel, Seroquel then stopped stopped dt concern it may be contributing to elevated LFTs     -- medications have been adjusted this stay -- per psych assessment on 6/15, cont on Trintellix and Seroquel 25mg HS plus 15mg TID prn; stopped trazodone and Restoril, Remeron dose decreased to 15mg HS  -- patient discharged on this regimen  -- minimize use of prn Seroquel as able so as not to exacerbate abnl LFTs     Moderate Aortic Stenosis:  Noted on echo this stay.   Should follow up with cardiology after discharge.     Hypertension:  Chronic and stable on Metoprolol     Dyslipidemia:  Statin stopped this stay this stay given abnl LFTs     Insomnia:  Restoril and Trazodone were stopped this stay.      Chronic Back Pain:  On narcotic agreement.  Remains on gabapentin (300mg daily, 600mg HS) and prn Tramadol (though dose decreased to 25mg TID prn)[KW1.2]    Nan Macias    Significant Results and Procedures[KW1.1]   6/12/17 EUS per Dr. Graham  Findings:        Endoscopic Finding :        LA Grade C (one or more mucosal breaks continuous between tops of 2 or        more mucosal folds, less than 75% circumference) esophagitis with no         bleeding was found 30 cm from the incisors. Biopsies were taken with a        cold forceps for histology.        Evidence of a Otis-en-Y gastrojejunostomy was found. The gastrojejunal        anastomosis was characterized by congestion, edema, erosion, erythema,        inflammation and ulceration. This was traversed. The pouch-to-jejunum        limb was characterized by congestion. The duodenum-to-jejunum limb was        examined.        The examined jejunum was normal.        Endosonographic Finding :        Moderte portal gastropathy.        There was no sign of significant endosonographic abnormality in the        esophagus.        Endosonographic images of the stomach were unremarkable.        Endosonographic imaging of the pancreas showed sonographic changes        indicative of mild chronic pancreatitis in the pancreatic body and in        the pancreatic tail. The parenchyma had hyperechoic foci, hypoechoic        foci and lobularity. The pancreatic duct had hyperechoic walls. The        pancreatic duct measured up to 2 mm in diameter.        No lymphadenopathy seen.        There was no sign of significant endosonographic abnormality in the        bifurcation of the common hepatic duct and in the intrahepatic bile        duct(s).        There was no sign of significant endosonographic abnormality in the left        lobe of the liver. Small amount oa parihepatic Ascites seen.                                                                                     Impression:                 - LA Grade C esophagitis. Biopsied.   - Otis-en-Y gastrojejunostomy with gastrojejunal anastomosis characterized by congestion, edema, erosion, erythema, inflammation and ulceration.    - Normal examined jejunum.   - There was no sign of significant pathology in the esophagus.   - Endosonographic images of the stomach were unremarkable.   - Endosonographic imaging of the pancreas showed sonographic changes suggestive of mild  chronic pancreatitis.   - There was no sign of significant pathology in the bifurcation of the common hepatic duct and in the intrahepatic bile duct(s).   - There was no evidence of significant pathology in the left lobe of the liver.     Recommendation:             - Use sucralfate tablets 1 gram PO QID.   - I will contact patient with Biopsy result in 1-2 weeks. Please contact our Office at  at [facility] if ther is any questions.    -----------------------------------  6/6/17 Echocardiogram:  Interpretation Summary     Hyperdynamic left ventricular functionThe visual ejection fraction is  estimated at >70%.The transmitral spectral Doppler flow pattern is suggestive  of impaired LV relaxation.  The right ventricular systolic function is normal.  The left atrium is mild to moderately dilated.  Moderate valvular aortic stenosis.Mean gradient 29 mm hg, RENE 1.1 cm2.  The ascending aorta is mildly dilated.     Compared to echo dated 08/04/2015 no singificant change, aortic valve  gradients and AV area appear similar.[KW1.2]    Pending Results   These results will be followed up by[KW1.1] PCP / GI[KW1.2]  Unresulted Labs Ordered in the Past 30 Days of this Admission     Date and Time Order Name Status Description    6/13/2017 0000 HSV IgM antibody In process[KW1.3]           Code Status[KW1.1]   Full Code[KW1.2]       Primary Care Physician   Alison Pena    Physical Exam   Temp: 98.4  F (36.9  C) Temp src: Oral BP: 131/80 Pulse: 86 Heart Rate: 85 Resp: 18 SpO2: 96 % O2 Device: None (Room air)    Vitals:    06/14/17 0649 06/15/17 0702 06/16/17 0500   Weight: 90.5 kg (199 lb 8.3 oz) 88.9 kg (195 lb 15.8 oz) 90 kg (198 lb 6.6 oz)     Vital Signs with Ranges  Temp:  [98.2  F (36.8  C)-98.4  F (36.9  C)] 98.4  F (36.9  C)  Pulse:  [86] 86  Heart Rate:  [] 85  Resp:  [16-18] 18  BP: (125-137)/(80-89) 131/80  SpO2:  [94 %-98 %] 96 %  I/O last 3 completed shifts:  In: 300 [P.O.:300]  Out: 1500  [Urine:1500]    General: Resting comfortably, alert, conversive, NAD  CVS:[KW1.1] HRRR with +SM along sternal border[KW1.2]  Respiratory:[KW1.1] CTAB, no wheeze/rales/rhonchi, breathing non-labored[KW1.2]  GI:[KW1.1] S, NT, ND, +BS[KW1.2]  Ext:[KW1.1] +bilateral LE edema to the knee[KW1.2]  Skin: Warm/dry[KW1.1], jaundice improved, scattered ecchymosis on upper extremities[KW1.2]    Discharge Disposition[KW1.1]   Discharged to short-term care facility[KW1.2]  Condition at discharge:[KW1.1] Stable[KW1.2]    Consultations This Hospital Stay   INFECTIOUS DISEASES IP CONSULT  PSYCHIATRY IP CONSULT  GASTROENTEROLOGY IP CONSULT  NEPHROLOGY IP CONSULT  HEMATOLOGY & ONCOLOGY IP CONSULT    PHYSICAL THERAPY ADULT IP CONSULT  OCCUPATIONAL THERAPY ADULT IP CONSULT    Time Spent on this Encounter[KW1.1]   INan, personally saw the patient today and spent greater than 30 minutes discharging this patient.[KW1.2]    Discharge Orders     General info for SNF   Length of Stay Estimate: Short Term Care: Estimated # of Days <30  Condition at Discharge: Improving  Level of care:skilled   Rehabilitation Potential: Good  Admission H&P remains valid and up-to-date: Yes  Recent Chemotherapy: N/A  Use Nursing Home Standing Orders: N/A     Mantoux instructions   Give two-step Mantoux (PPD) Per Facility Policy Yes     Reason for your hospital stay   Evaluation of your lower extremity swelling and abnormal liver tests. It was determined that you likely have liver damage which is related to your alcohol use. Your medications were adjusted.     Follow Up and recommended labs and tests   1. Follow up with PCP or facility physician in 5-7 days with repeat CMP and CBC.  2. Follow up with Dr. Graham (gastroenterology) in clinic in 2-4 weeks.  3. Follow up with Dr. Pa (hematology/oncology) in 2-4 weeks.     Activity - Up with nursing assistance     Daily weights   Call Provider for weight gain of more than 2 pounds per day or 5  pounds per week.     Full Code     Physical Therapy Adult Consult   Evaluate and treat as clinically indicated.    Reason:  deconditioning     Occupational Therapy Adult Consult   Evaluate and treat as clinically indicated.    Reason:  deconditioning     Advance Diet as Tolerated   Follow this diet upon discharge: 2g salt       Discharge Medications   Current Discharge Medication List      START taking these medications    Details   vortioxetine (TRINTELLIX/BRINTELLIX) 5 MG tablet Take 1 tablet (5 mg) by mouth daily    Associated Diagnoses: Depression, unspecified depression type      folic acid (FOLVITE) 1 MG tablet Take 1 tablet (1 mg) by mouth daily  Qty: 30 tablet    Associated Diagnoses: Alcohol abuse      thiamine 100 MG tablet Take 1 tablet (100 mg) by mouth daily    Associated Diagnoses: Alcohol abuse      sucralfate (CARAFATE) 1 GM/10ML suspension Take 10 mLs (1 g) by mouth 4 times daily (before meals and nightly)  Qty: 1200 mL    Associated Diagnoses: Esophagitis      ranitidine (ZANTAC) 150 MG tablet Take 1 tablet (150 mg) by mouth 2 times daily  Qty: 60 tablet    Associated Diagnoses: Gastroesophageal reflux disease, esophagitis presence not specified; Esophagitis      magnesium oxide (MAG-OX) 400 MG tablet Take 1 tablet (400 mg) by mouth daily  Qty: 7 tablet    Associated Diagnoses: Low magnesium levels      !! QUEtiapine (SEROQUEL) 25 MG tablet Take 1 tablet (25 mg) by mouth At Bedtime  Qty: 60 tablet    Associated Diagnoses: Depression, unspecified depression type      lactulose (CHRONULAC) 10 GM/15ML solution Take 15 mLs (10 g) by mouth 2 times daily  Refills: 0    Associated Diagnoses: Elevated LFTs; Constipation, unspecified constipation type; Alcoholic fatty liver      polyethylene glycol (MIRALAX/GLYCOLAX) Packet Take 17 g by mouth daily as needed (constipation)  Qty: 7 packet    Associated Diagnoses: Constipation, unspecified constipation type      senna-docusate (SENOKOT-S;PERICOLACE) 8.6-50 MG  per tablet Take 1-2 tablets by mouth 2 times daily as needed for constipation  Qty: 100 tablet    Associated Diagnoses: Constipation, unspecified constipation type      !! QUEtiapine (SEROQUEL) 25 MG tablet Take 0.5 tablets (12.5 mg) by mouth 3 times daily as needed (anxiety)  Qty: 60 tablet    Associated Diagnoses: Anxiety       !! - Potential duplicate medications found. Please discuss with provider.      CONTINUE these medications which have CHANGED    Details   mirtazapine (REMERON) 15 MG tablet Take 1 tablet (15 mg) by mouth At Bedtime  Qty: 30 tablet    Associated Diagnoses: Depression, unspecified depression type      metoprolol (TOPROL XL) 25 MG 24 hr tablet Take 0.5 tablets (12.5 mg) by mouth daily  Qty: 90 tablet, Refills: 3    Associated Diagnoses: PVC's (premature ventricular contractions)      traMADol (ULTRAM) 50 MG tablet Take 0.5 tablets (25 mg) by mouth 2 times daily as needed for moderate pain TAKE 1 TABLET BY MOUTH TWICE DAILY AS NEEDED FOR MODERATE PAIN  Qty: 60 tablet, Refills: 3    Associated Diagnoses: Chronic pain syndrome; Controlled substance agreement signed      !! gabapentin (NEURONTIN) 300 MG capsule Take 1 capsule (300 mg) by mouth daily  Qty: 90 capsule    Associated Diagnoses: Chronic pain syndrome       !! - Potential duplicate medications found. Please discuss with provider.      CONTINUE these medications which have NOT CHANGED    Details   !! GABAPENTIN PO Take 600 mg by mouth At Bedtime      metoclopramide (REGLAN) 5 MG tablet Take 1 tablet (5 mg) by mouth 4 times daily as needed  Qty: 40 tablet, Refills: 0    Associated Diagnoses: Nausea      valACYclovir (VALTREX) 1000 mg tablet TAKE 2 TABS EVERY 12 HRS FOR 1 DAY ONLY FOR OUTBREAKS OF COLD SORES as needed  Qty: 4 tablet, Refills: 3    Associated Diagnoses: Herpes simplex virus infection      Multiple Vitamins-Minerals (CENTRUM SILVER) per tablet Take 1 tablet by mouth daily       !! - Potential duplicate medications found.  Please discuss with provider.      STOP taking these medications       Potassium Chloride ER 20 MEQ TBCR Comments:   Reason for Stopping:         cephALEXin (KEFLEX) 500 MG capsule Comments:   Reason for Stopping:         minocycline (MINOCIN/DYNACIN) 100 MG capsule Comments:   Reason for Stopping:         furosemide (LASIX) 20 MG tablet Comments:   Reason for Stopping:         traZODone (DESYREL) 100 MG tablet Comments:   Reason for Stopping:         temazepam (RESTORIL) 15 MG capsule Comments:   Reason for Stopping:         naproxen (NAPROSYN) 500 MG tablet Comments:   Reason for Stopping:         atorvastatin (LIPITOR) 40 MG tablet Comments:   Reason for Stopping:         aspirin 81 MG tablet Comments:   Reason for Stopping:             Allergies   Allergies   Allergen Reactions     Bactrim [Sulfamethoxazole W/Trimethoprim] Hives     Codeine Itching     NAUSEA     Morphine Itching     NAUSEA     Data   Most Recent 3 CBC's:[KW1.1]  Recent Labs   Lab Test  06/16/17   0845  06/15/17   1714  06/14/17   0812  06/13/17   0800   WBC  6.4  6.7   --   7.2   HGB  9.2*  10.0*   --   11.7   MCV  100  100   --   103*   PLT  106*  111*  61*  64*[KW1.4]      Most Recent 3 BMP's:[KW1.1]  Recent Labs   Lab Test  06/16/17   0845  06/15/17   0822  06/14/17   0812  06/13/17   1445  06/13/17   0800   NA  137   --   134  131*  132*   POTASSIUM  3.5   --   4.2   --   4.7   CHLORIDE  98   --   97   --   96   CO2  32   --   33*   --   32   BUN  5*   --   10   --   13   CR  0.74  0.87  0.96  1.00  1.05*   ANIONGAP  7   --   4   --   4   BIRGIT  8.1*   --   8.2*   --   8.4*   GLC  89   --   74   --   88[KW1.5]     Most Recent 2 LFT's:[KW1.1]  Recent Labs   Lab Test  06/16/17   0845  06/15/17   1714   AST  89*  96*   ALT  39  39   ALKPHOS  154*  196*   BILITOTAL  5.8*  7.1*[KW1.5]     Most Recent INR's and Anticoagulation Dosing History:  Anticoagulation Dose History     Recent Dosing and Labs Latest Ref Rng & Units 11/7/2010 4/11/2012  9/29/2015 6/7/2017 6/10/2017 6/11/2017 6/16/2017    INR 0.86 - 1.14 0.89 0.93 0.86 1.02 1.19(H) 1.15(H) 1.03        Most Recent Cholesterol Panel:  Recent Labs   Lab Test  05/11/17   1255   CHOL  232*   LDL  98   HDL  114   TRIG  98     Most Recent 6 Bacteria Isolates From Any Culture (See EPIC Reports for Culture Details):[KW1.1]  Recent Labs   Lab Test  06/06/17   1530  06/06/17   1457  04/30/14   1815  02/07/14   1445  04/11/12   1225  04/11/12   0603   CULT  No growth  No growth  >100,000 colonies/mL Escherichia coli*  >100,000 colonies/mL Mixed gram negative and positive johana Multiple species present, probable perineal contamination. Susceptibility testing not routinely done  Heavy growth Candida albicans Plus Light growth Normal respiratory johana  No growth after 6 days[KW1.5]     Most Recent TSH, T4 and A1c Labs:  Recent Labs   Lab Test  05/11/16   1447   09/29/10   1110   TSH  1.33   < >   --    A1C   --    --   5.7    < > = values in this interval not displayed.     Results for orders placed or performed during the hospital encounter of 06/06/17   US Lower Extremity Venous Duplex Bilateral    Narrative    ULTRASOUND LOWER EXTREMITY VENOUS DUPLEX BILATERAL   6/6/2017 3:49 PM     HISTORY: Swelling and redness.    COMPARISON: Ultrasound legs 2/1/2016.    FINDINGS: Gray-scale, color and Doppler spectral analysis ultrasound  was performed of the bilateral legs. Compression and augmentation  imaging was performed.    There is no evidence for deep venous thrombosis. Subcutaneous edema  identified bilaterally.      Impression    IMPRESSION: No evidence for DVT within either leg.    ORESTES BARRERA MD   US Abdomen Limited    Narrative    ULTRASOUND ABDOMEN LIMITED  6/7/2017 9:00 AM     HISTORY: Rule out liver pathology.    COMPARISON: 4/10/2012.    FINDINGS: The gallbladder is absent. There is abnormal increased  echogenicity of the liver with impaired acoustic transmission. Liver  size is normal. No focal liver lesion  or intrahepatic biliary ductal  dilatation. Common bile duct measures 5 mm. Pancreas is completely  obscured by intestinal gas. The right kidney measures 9.7 cm in length  and appears normal.      Impression    IMPRESSION: Fatty infiltrated liver.    KELLEY MOORE MD   CT Abdomen Pelvis w Contrast    Narrative    CT ABDOMEN AND PELVIS WITH CONTRAST 6/14/2017 4:16 PM    HISTORY: 71-year-old patient with obstructive jaundice and  thrombocytopenia. Request made for evaluation of hepatobiliary tree,  pancreas, and splenomegaly.    COMPARISON: April 10, 2012.    TECHNIQUE: Axial and coronal CT images obtained from the lung bases  through the abdomen and pelvis after the uneventful administration of  Isovue-370 intravenous contrast given for a total of 50 mL. Radiation  dose for this scan was reduced using automated exposure control,  adjustment of the mA and/or kV according to patient size, or iterative  reconstruction technique.    FINDINGS: Bilateral breast implants are again identified, partially  visible. Lung bases are unremarkable. Heart size is normal without  pericardial effusion. Right total hip arthroplasty. Intervertebral  disc space narrowing throughout the lumbar spine. No acute osseous  abnormality.    Diffuse fatty infiltration throughout the liver, not readily  identified on previous exam. Cholecystectomy clips. No evidence of  intrahepatic or extrahepatic biliary dilatation. No pancreatic ductal  dilatation. Postsurgical changes with gastrojejunostomy. Spleen is  normal in size measuring up to 9.4 cm. The adrenal glands and kidneys  appear normal.    Cortical thinning of the left kidney, compared to the right, though no  hydronephrosis. Bladder is partially distended and unremarkable.  Status post hysterectomy. No dilated loops of bowel. Scattered  bilateral inguinal lymph nodes incidentally identified, the largest of  which measures up to 2.6 x 1.4 cm, though appears to have a fatty  hilum. Patient  did have variable degree of lymph nodes in similar  location on previous exam, suspect minimally larger on today's exam.      Impression    IMPRESSION:  1. Diffuse fatty infiltration throughout the liver. No evidence of  intrahepatic or extrahepatic biliary dilatation. No visible pancreatic  abnormality. Spleen is normal in size.  2. Cortical thinning of the left kidney, relative to the right, though  unchanged compared to previous exam.  3. Enlarged inguinal lymph nodes, though some of the larger lymph  nodes have fatty leilani. Lymph nodes may be minimally increased in size  compared to previous exam. They may be reactive, though nonspecific.    ZEYNEP CASTRO MD[KW1.1]        Revision History        User Key Date/Time User Provider Type Action    > KW1.4 6/16/2017 10:36 AM Nan Macias DO Physician Sign     KW1.3 6/16/2017 10:35 AM Nan Macias,  Physician      KW1.5 6/16/2017  9:46 AM Nan Macias DO Physician      KW1.2 6/16/2017  9:45 AM Nan Macias,  Physician      KW1.1 6/16/2017  9:27 AM Nan Macias DO Physician                      Consult Notes      Consults by Colton Vargas MD at 6/16/2017  9:19 AM     Author:  Colton Vargas MD Service:  Psychiatry Author Type:  Physician    Filed:  6/16/2017  9:24 AM Date of Service:  6/16/2017  9:19 AM Creation Time:  6/16/2017  9:23 AM    Status:  Signed :  Colton Vargas MD (Physician)     Consult Orders:    1. Psychiatry IP Consult: f/u, assess meds, anxiety; Consultant may enter orders: Yes; Patient to be seen: Routine; Call back #: [phone] [[id number]] ordered by Moe Casillas MD at 06/15/17 0814                Follow-Up Psychiatric Consult: Time Spent: 15 Minutes  Colton Vargas MD.[TW1.1]       Revision History        User Key Date/Time User Provider Type Action    > TW1.1 6/16/2017  9:24 AM Colton Vargas MD Physician Sign            Consults by  Moe Casillas MD at 6/15/2017  8:13 AM     Author:  Moe Casillas MD Service:  Psychiatry Author Type:  Physician    Filed:  6/15/2017  8:13 AM Date of Service:  6/15/2017  8:13 AM Creation Time:  6/15/2017  7:57 AM    Status:  Signed :  Moe Casillas MD (Physician)         [facility] Follow[facility]up [facility]iatric [facility] Progress Note      Interval History:   Pt seen, chart reviewed, care reviewed with treatment team. Debra is very jaundiced, tremulous, not sleeping, and feels anxious. She is on multiple meds that could worsen this tremor, and due to her hepatic impairment dosage adjustment is required. She just started trintellex, but remeron, tramadol, trazadone and restoril are also on board. We discussed use of seroquek in low doses, stopping trazadone, and restoril which is not safe with her age and CD history. We will also lower tramadol and remeron. She seems willing to consider IP CD tx-[facility] was discussed as she has medicare and complex medical issues.    Review of systems:    10 point Review of Systems completed is negative other than noted in the HPI,  BP/P/temp, weight reviewed.     Medications:       mirtazapine  15 mg Oral At Bedtime     QUEtiapine  25 mg Oral At Bedtime     sodium chloride (PF)  10 mL Intracatheter Q8H     lactulose  10 g Oral BID     sucralfate  1 g Oral 4x Daily AC & HS     ranitidine  150 mg Oral BID     magnesium oxide  400 mg Oral Daily     vitamin  B-1  100 mg Oral Daily     vortioxetine  5 mg Oral Daily     metoprolol  12.5 mg Oral Daily     gabapentin (NEURONTIN) tablet 600 mg  600 mg Oral At Bedtime     gabapentin (NEURONTIN) capsule 300 mg  300 mg Oral Daily     multivitamin, therapeutic  1 tablet Oral Daily     sodium chloride (PF)  3 mL Intracatheter Q8H     folic acid  1 mg Oral Daily     metoclopramide, LORazepam, QUEtiapine, traMADol, lidocaine, sodium chloride (PF), miconazole, potassium chloride, potassium chloride,  potassium chloride, potassium chloride with lidocaine, potassium chloride, magnesium sulfate, naloxone, lidocaine, lidocaine 4%, sodium chloride (PF), ondansetron **OR** ondansetron      Mental Status Examination:     Appearance:  awake, alert, adequately groomed, dressed in hospital scrubs and appeared older than stated age, very jaundiced  Eye Contact:  good  Speech:  clear, coherent  Use of Language:Appropriate  Psychomotor Behavior:  tremor observed   Mood:  anxious  Affect:  intensity is heightened  Thought Process:  logical, linear and goal oriented no loose associations  Thought Content:  no evidence of suicidal ideation or homicidal ideation and no evidence of psychotic thought  Oriented to:  time, person, and place  Attention Span and Concentration:  fair  Recent and Remote Memory:  intact  Fund of Knowledge: appropriate  Muscle Strength and Tone: normal  Coordination, Station and Gait: Normal  Insight:  fair  Judgment:  fair        Labs/vitals:     Recent Results (from the past 24 hour(s))   Comprehensive metabolic panel    Collection Time: 06/14/17  8:12 AM   Result Value Ref Range    Sodium 134 133 - 144 mmol/L    Potassium 4.2 3.4 - 5.3 mmol/L    Chloride 97 94 - 109 mmol/L    Carbon Dioxide 33 (H) 20 - 32 mmol/L    Anion Gap 4 3 - 14 mmol/L    Glucose 74 70 - 99 mg/dL    Urea Nitrogen 10 7 - 30 mg/dL    Creatinine 0.96 0.52 - 1.04 mg/dL    GFR Estimate 57 (L) >60 mL/min/1.7m2    GFR Estimate If Black 69 >60 mL/min/1.7m2    Calcium 8.2 (L) 8.5 - 10.1 mg/dL    Bilirubin Total 8.1 (H) 0.2 - 1.3 mg/dL    Albumin 2.5 (L) 3.4 - 5.0 g/dL    Protein Total 5.3 (L) 6.8 - 8.8 g/dL    Alkaline Phosphatase 213 (H) 40 - 150 U/L    ALT 25 0 - 50 U/L    AST 65 (H) 0 - 45 U/L   Platelet count    Collection Time: 06/14/17  8:12 AM   Result Value Ref Range    Platelet Count 61 (L) 150 - 450 10e9/L   Ferritin    Collection Time: 06/14/17  8:12 AM   Result Value Ref Range    Ferritin 167 8 - 252 ng/mL   Iron and iron  binding capacity    Collection Time: 06/14/17  8:12 AM   Result Value Ref Range    Iron 123 35 - 180 ug/dL    Iron Binding Cap 126 (L) 240 - 430 ug/dL    Iron Saturation Index 98 (H) 15 - 46 %   Vitamin B12    Collection Time: 06/14/17  8:12 AM   Result Value Ref Range    Vitamin B12 1832 (H) 193 - 986 pg/mL   Folate    Collection Time: 06/14/17  8:12 AM   Result Value Ref Range    Folate 26.9 >5.4 ng/mL   Lactate Dehydrogenase    Collection Time: 06/14/17  8:12 AM   Result Value Ref Range    Lactate Dehydrogenase 202 81 - 234 U/L   Bilirubin direct    Collection Time: 06/14/17  8:12 AM   Result Value Ref Range    Bilirubin Direct 6.9 (H) 0.0 - 0.2 mg/dL     B/P: 134/85, T: 97.5, P: 97, R: 16    Impression:   Kavitha appears anxious, but is very ill, hepatic impairment is significant with polypharnacy. We will utilize seroquel for sleep/anxiety in low doses-make downward adjustments in other meds      DIagnoses:   1. Alcohol use disorder, severe  2. Anxiety disorder NOS  3. Severe hepatic impairment and thrombocytopenia from alcohol use       Plan:   1. Written information given on medications. Side effects, risks, benefits reviewed.  2. Stop trazadone, restoril, and lower remeron to 15mg qhs, lower tramadol to 25mg bid prn  3. Continue trintellex 5mg qam, add seroquel 25mg qhs, 15.5mg tid prn/anxiety  4. Suggest IP hospital based CD tx when medically clear. [facility] may be best option      Attestation:  Patient has been seen and evaluated by me,  Moe Casillas MD[PR1.1]       Revision History        User Key Date/Time User Provider Type Action    > PR1.1 6/15/2017  8:13 AM Moe Casillas MD Physician Sign            Consults by Moe Casillas MD at 6/15/2017  7:40 AM     Author:  Moe Casillas MD Service:  Psychiatry Author Type:  Physician    Filed:  6/15/2017  7:40 AM Date of Service:  6/15/2017  7:40 AM Creation Time:  6/15/2017  7:40 AM    Status:  Signed :   Moe Casillas MD (Physician)     Consult Orders:    1. Psychiatry IP Consult: Follow up anxiety, for medication adjustment.; Consultant may enter orders: Yes; Patient to be seen: Routine; Call back #: [phone] [[id number]] ordered by Lina Caldera MD at 06/14/17 0950                Pt seen for psychiatric consult follow-up, see my note    Moe Casillas MD[PR1.1]      Revision History        User Key Date/Time User Provider Type Action    > PR1.1 6/15/2017  7:40 AM Moe Casillas MD Physician Sign            Consults signed by Nahum Pa MD at 6/14/2017  2:13 PM      Author:  Nahum Pa MD Service:  Hem/Onc Author Type:  Physician    Filed:  6/14/2017  2:13 PM Date of Service:  6/13/2017  4:02 PM Creation Time:  6/13/2017  7:18 PM    Status:  Signed :  Nahum Pa MD (Physician)         REASON FOR CONSULTATION:  This consult has been requested by Dr. Caldera for thrombocytopenia.      HISTORY OF PRESENT ILLNESS:  Ms. Kavitha Headley is a 71-year-old female who has been admitted to the hospital with bilateral lower extremity cellulitis and abnormal liver function tests.  The patient has multiple medical problems, including hypertension, dyslipidemia and alcohol abuse.  In the hospital, the patient has developed thrombocytopenia.  Heparin-induced thrombocytopenia has been ruled out.  Labs are reviewed and summarized below.      1.  On 05/26/2017, WBC of 8.1, hemoglobin of 13.6 and platelets of 242.     2.  On 06/06/2017:  -Platelets of 217.  Her platelets then started to progressively decrease.  It decreased to 51 on 06/12/2017.  WBC has remained normal.  Hemoglobin decreased slightly to 11.3.   -Chemistry panel on 06/06/2017 revealed elevated AST, ALT, alkaline phosphatase and bilirubin.   3.  Ultrasound abdomen on 06/07/2017 reveals fatty infiltration of the liver.    4. Hepatitis B and C on 06/09/2017 are negative.   5.  Multiple labs done on 06/11/2017:   -INR  1.15.   -Fibrinogen of 194.   -D-dimer of 1.8.   -HIT antibody negative.      The patient was admitted with bilateral leg cellulitis.  On admission, she was started on vancomycin.  It was discontinued after 2 doses.  She was on IV Ancef.  Now she is on oral clindamycin.  Before admission, the patient had been treated with oral clindamycin, minocycline and Keflex.      The patient denies any previous history of blood disorder.  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  She never was told to have low platelets.  I reviewed the CBC going back to 2006.  She did not have thrombocytopenia.      The patient's other problem is alcohol abuse.  She has been drinking on and off since the age of 28.  The patient says that a few months ago she lost her job.  Since then she has been drinking more.  She drinks every day.  Her LFTs are elevated.  She has been evaluated by gastroenterologist.  EUS on 06/12/2017 reveals LA grade C esophagitis.  There is finding of mild chronic pancreatitis.  Dr. Graham (GI) thinks that the patient most likely has underlying cirrhosis.  Acute elevation of LFTs secondary to alcohol abuse.      I met with the patient.  Daughter was in the room.  The patient is anxious and nervous.  The patient denies bleeding from any site.  No bleeding from ear, nose or throat.  No blood in the urine or stool.  No black stool.  She has some easy bruising.      REVIEW OF SYSTEMS:  Some headache.  Some dizziness.  No chest pain.  No shortness of breath.  No vomiting.  Some nausea.  No urinary complaints.  She has some constipation.      ALLERGIES:  Reviewed.      MEDICATIONS:  Reviewed.      PAST MEDICAL HISTORY:   1.  Alcohol abuse.   2.  Hypertension.   3.  Dyslipidemia.   4.  Mild coronary artery disease.   5.  Lower extremity cellulitis.   6.  Gastric bypass surgery for weight loss.   7.  Back pain.   8.  Depression/anxiety.     9.  Moderate aortic stenosis.     10.  Supraventricular tachycardia.   11.   History of chronic kidney disease.   12.  Bilateral knee replacement.     13.  Right hip replacement.   14.  Appendectomy.   15.  Carpal tunnel surgery.    16.  Cholecystectomy.     17.  Cystocele repair.     18.  Mammoplasty.   19.  Hysterectomy with bilateral salpingo-oophorectomy.   20.  Laparotomy with lysis of adhesions and ventral hernia repair.      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    -No history of smoking.    -She has been drinking alcohol since age of 28.  Lately she has been drinking more alcohol.      FAMILY HISTORY:    -Mother  of some rare blood disorder.  It sounds like TTP.    -Father  of COPD and lung cancer.    -She has 2 sisters and 1 brother who are in good health.      PHYSICAL EXAMINATION:   GENERAL:  She was alert and oriented x3.   VITAL SIGNS:  Reviewed.   EYES:  No icterus.   THROAT:  No ulcer or thrush.   NECK:  Supple.  No lymphadenopathy.   AXILLAE:  No lymphadenopathy.   ABDOMEN:  Soft, nontender.  No mass felt.  Difficult palpation because of her weight.   EXTREMITIES:  Mild edema.   SKIN:  No petechiae.  A few ecchymotic spots on the upper extremities, mainly around the IV insertion site.      LABORATORY DATA:  Reviewed.      ASSESSMENT:   1.  A 71-year-old female with acute thrombocytopenia.     2.  Alcoholic liver disease   3.  Lower extremity cellulitis on antibiotics.   4.  Elevated liver function tests.      RECOMMENDATIONS:   1.  I had a long discussion with the patient and her daughter.  Reviewed the labs.  Discussed regarding thrombocytopenia.  When the patient was admitted, her platelets were normal.  They were 217 on 2017. It decreased 51 on ; today they are better at 64.      Different causes of thrombocytopenia discussed.  Basic mechanism is decreased production or increased destruction.  As her thrombocytopenia is acute, it goes more in favor of increased destruction. We discussed regarding various causes including ITP, drug-induced  thrombocytopenia and liver disease.  Heparin-induced thrombocytopenia has been ruled out.      Patient's thrombocytopenia is multifactorial. The patient has cellulitis.  She received outpatient oral antibiotics.  On admission, she received vancomycin for 2 doses.  Antibiotics can cause thrombocytopenia.  Vancomycin has been known to rarely cause immune thrombocytopenia.  This was published in the [employer] Journal on 2007.  INR and D-dimer mildly elevated.  Fibrinogen is slightly low.  This would all go in favor of low-grade disseminated intravascular coagulation.  Alcohol abuse and liver disease also contributing to it.  At this time, I am not suspecting primary bone marrow pathology.      We will get some baseline labs, including vitamin B12, folate, serum protein electrophoresis.      I am hoping that her platelets will improve or normalize.  If her platelets do not improve significantly, we will consider doing a bone marrow biopsy.      2.  Patient advised to quit alcohol completely.  I told her that with continued alcohol use, she will have worsening liver function and its complication.      3.  For her elevated LFT, I would recommend getting a CT abdomen and pelvis to evaluate both liver and spleen.      4.  The patient and daughter had multiple questions, which were all answered.      Thanks for the consult.      Total time spent 60 minutes, more than half the time spent in counseling.         CHRISTIE DAY MD             D: 2017 16:02   T: 2017 18:16   MT: [id number]      Name:     DARWIN JASSO   MRN:      [id number]        Account:       [id number]   :      1945           Consult Date:  2017      Document: [id number][BK1.1]         Revision History        User Key Date/Time User Provider Type Action    > BK1.1 2017  2:13 PM Christie Day MD Physician Sign     [N/A] 2017  7:18 PM Christie Day MD Physician Edit            Consults by Elsie Moraes RD, LD at  "6/13/2017 10:43 AM     Author:  Elsie Moraes, JOHN BLOOM Service:  Nutrition Author Type:  Registered Dietitian    Filed:  6/13/2017 10:43 AM Date of Service:  6/13/2017 10:43 AM Creation Time:  6/13/2017 10:27 AM    Status:  Signed :  Elsie Moraes, JOHN BLOOM (Registered Dietitian)     Consult Orders:    1. Nutrition Services Adult IP Consult [[id number]] ordered by Lina Caldera MD at 06/13/17 0848                CLINICAL NUTRITION SERVICES  -  ASSESSMENT NOTE      Recommendations Ordered by Registered Dietitian (MERLE):   Ensure Shakes BID between meals     Malnutrition: Patient does not meet two of the above criteria necessary for diagnosing malnutrition        REASON FOR ASSESSMENT  Kavitha Headley is a 71 year old female seen by Registered Dietitian for Provider Order - education - cirrhosis, hypoalbuminemia and LOS      NUTRITION HISTORY  - Information obtained from the pt.  She had gastric bypass surgery 10 years ago, she is used to small meals.   Pt says she has a poor diet, admits to drinking ETOH.  She doesn't eat breakfast, often doesn't eat lunch or may have a sandwich. She cooks dinner and eats a small amount.  Per H&P, she has 2-3 cocktails every night.      CURRENT NUTRITION ORDERS  Diet Order:     2000 mg Sodium     Current Intake/Tolerance:  Pt's intake is poor, 25-75% per flow sheets. She c/o constipations, abd discomfort.  For breakfast she ate 4 bites on an omelet, some banana bread and is now snacking on her fruit.      PHYSICAL FINDINGS  Observed  Jaundice   Obtained from Chart/Interdisciplinary Team  Chronic LE edema - likely due to underlying liver disease and low albumin    ANTHROPOMETRICS  Height: 5' 4\"  Admit Weight: 85.2 kg  Body mass index is 32 kg/(m^2).  Weight Status:  Obesity Grade I BMI 30-34.9  IBW: 54.5 kg +/- 10%  % IBW: 156%  Weight History: Pt states she weighed 293# before gastric bypass surgery 10 years ago.  Her admit wt of 85.2 kg has been stable.[CM1.1]  Wt Readings " from Last 10 Encounters:   06/13/17 89.7 kg (197 lb 12 oz)   06/02/17 84.4 kg (186 lb)   05/30/17 87.7 kg (193 lb 6.4 oz)   05/26/17 84.8 kg (187 lb)   05/09/17 84.9 kg (187 lb 3.2 oz)   01/17/17 88 kg (194 lb)   01/06/17 88.5 kg (195 lb)   12/12/16 85.3 kg (188 lb)   10/06/16 81.6 kg (180 lb)   09/21/16 84.6 kg (186 lb 6.4 oz)[CM1.2]       LABS  Alb 2.4 - Not a reliable indicator of malnutrition. Albumin and prealbumin are negative acute-phase reactants, they decrease when inflammation is present and rise when it is resolved.    MEDICATIONS  Folvite  Thera-Vit  Thiamin    Dosing Weight 62.2 kg - adjusted for obesity    ASSESSED NUTRITION NEEDS PER APPROVED PRACTICE GUIDELINES:  Estimated Energy Needs: 8345-6451 kcals (25-30 Kcal/Kg)  Justification: obese  Estimated Protein Needs: 75-95 grams protein (1.2-1.5 g pro/Kg)  Justification: preservation of lean body mass    MALNUTRITION:  % Weight Loss:  None noted  % Intake:  </= 50% for >/= 5 days (severe malnutrition)  Subcutaneous Fat Loss:  None observed  Muscle Loss:  None observed  Fluid Retention:  Chronic, likely not nutrition-related    Malnutrition Diagnosis: Patient does not meet two of the above criteria necessary for diagnosing malnutrition      NUTRITION DIAGNOSIS:  Inadequate oral intake related to altered GI function as evidenced by report of poor intake, 25-75%       NUTRITION INTERVENTIONS  Recommendations / Nutrition Prescription  Continue 2 gm Na diet  Nutritional supplements.      Implementation  Nutrition education: Not appropriate at this time due to pt's poor intake  Medical Food Supplement - will send Ensure Shakes BID between meals.      Nutrition Goals  Pt will consume at least 50% of meals and supplements.      MONITORING AND EVALUATION:  Progress towards goals will be monitored and evaluated per protocol and Practice Guidelines    Elsie Moraes RD  Pager [phone] (M-F)            [phone] (W/E & Hol)[CM1.1]                     Revision  History        User Key Date/Time User Provider Type Action    > CM1.2 6/13/2017 10:43 AM Elsie Moraes RD, JOHN Registered Dietitian Sign     CM1.1 6/13/2017 10:27 AM Elsei Moraes RD, JOHN Registered Dietitian             Consults by Parth Graham MD at 6/9/2017  7:00 PM     Author:  Parth Graham MD Service:  Gastroenterology Author Type:  Physician    Filed:  6/9/2017  7:54 PM Date of Service:  6/9/2017  7:00 PM Creation Time:  6/9/2017  7:41 PM    Status:  Signed :  Parth Graham MD (Physician)     Consult Orders:    1. Gastroenterology IP Consult: Acute hepatitis; Consultant may enter orders: Yes; Patient to be seen: Routine - within 24 hours; Requested Provider: Dr. Graham [[id number]] ordered by Nik Dejesus MD at 06/09/17 1159                [facility]  Gastroenterology Consultation         Kavitha Headley  [address] [address]  71 year old female    Admission Date/Time: 6/6/2017  Primary Care Provider: Alison Pena  Referring / Attending Physician:  Dr. Dejesus    We were asked to see the patient in consultation by Dr. Dejesus for evaluation of JAundice      CC: Jaundice    HPI:  Kavitha Headley is a 71 year old female who past medical history of hypertension, dyslipidemia, obesity status post gastric bypass, alcohol abuse, chronic pain syndrome on a pain agreement, history of left lower extremity cellulitis, history of macrocytic anemia, moderate aortic stenosis, chronic kidney disease stage III, history of supraventricular tachycardia, depression/anxiety, who came in for evaluation of bilateral lower extremity swelling and erythema.  She reports that usually her legs are not swollen, although she does take Lasix at home p.r.n. for leg swelling.  She states that she started having more swelling in her lower extremities 2 weeks ago.  Initially it was more obvious on left lower extremity.  Then, more obvious on the right lower  extremity.  She went to see her primary care physician for this.  It seems that on 05/26 she was started on clindamycin. Apparently she started having nausea and diarrhea while she was on clindamycin, so when she saw her primary care physician again on 05/30, the clindamycin was stopped and she was started on minocycline.  On 06/02, she was seen again in her primary care physician's office and she was told to start taking Keflex as well in addition to minocycline.  Despite all these different antibiotics, she continued to have lower extremity swelling, redness and some warmth reportedly.  She states that for the last few days she started taking Lasix 20 mg daily, but despite this, swelling again did not improve.  Upon further questioning, she denies any fever at home.  She denies any chest pain.      Patient was admitted with above history on 6/7. Patient has long standing h/o moderate to heavy ETOH use.  Patient has been shaky. Patient has elevated LFTs. AST mor than ALT.  Patient has elevated of Bili. Mostly direct and elevated Alk phos. Low albumin, and low platelets.  Patient denying H/O fever, chills, chest pain.     ROS: A comprehensive ten point review of systems was negative aside from those in mentioned in the HPI.      PAST MED HX:  I have reviewed this patient's medical history and updated it with pertinent information if needed.[AB1.1]   Past Medical History:   Diagnosis Date     Alcohol abuse     long term alcohol abuse     Anxiety disorder      Bariatric surgery status 1996?    gastric bypass, [facility] and     Benign hypertension      Chronic insomnia      Chronic low back pain      Chronic pain syndrome     Chronic back and neck pain, chronic pain due to osteoarthritis multiple joints     Coronary artery disease 9/2015    mild distal branch disease on cath     Disc disease, degenerative, cervical     C4-C7 disc disease     Diverticulitis     inpatient therapy 6/2006     Hip joint replacement status  4/2004    right     Knee joint replacement status 12/2005    left     Macrocytic anemia     Mild macrocytic anemia, 2012 to present, likely based on alcohol abuse.     Major depressive disorder, single episode, severe, without mention of psychotic behavior      Mixed hyperlipidemia      Moderate aortic stenosis 5/2014    mild/mod AS with peak gradient 33/mean gradient 20 mmHg, AV area 1.2     Pelvic relaxation disorder     Surgical intervention for cystocele/rectocele 3,11/2012     Personal history of urinary calculi 6/2006    left ureteral stone,lithotripsy     PVC (premature ventricular contraction)      Stage III chronic kidney disease 2005     SVT (supraventricular tachycardia) (H)     likely atrial tachycardia[AB1.2]       MEDICATIONS:[AB1.1]   Prior to Admission Medications   Prescriptions Last Dose Informant Patient Reported? Taking?   GABAPENTIN PO 6/5/2017 at hs  Yes Yes   Sig: Take 600 mg by mouth At Bedtime   GABAPENTIN PO prn  Yes Yes   Sig: Take 300 mg by mouth daily as needed In the morning if needed   Multiple Vitamins-Minerals (CENTRUM SILVER) per tablet 6/5/2017  Yes Yes   Sig: Take 1 tablet by mouth daily   Potassium Chloride ER 20 MEQ TBCR NEW  No No   Sig: Take 1 tablet (20 mEq) by mouth daily   aspirin 81 MG tablet 6/5/2017 at am  Yes Yes   Sig: Take 81 mg by mouth daily   atorvastatin (LIPITOR) 40 MG tablet 6/5/2017 at hs  No Yes   Sig: Take 1 tablet (40 mg) by mouth daily   cephALEXin (KEFLEX) 500 MG capsule 6/5/2017  No Yes   Sig: Take 1 capsule (500 mg) by mouth 4 times daily for 10 days   furosemide (LASIX) 20 MG tablet 6/5/2017 at am  No Yes   Sig: Take 1 tablet (20 mg) by mouth daily   metoclopramide (REGLAN) 5 MG tablet 6/5/2017 at x2  No Yes   Sig: Take 1 tablet (5 mg) by mouth 4 times daily as needed   metoprolol (TOPROL XL) 25 MG 24 hr tablet 6/6/2017 at am  No Yes   Sig: Take 1 tablet (25 mg) by mouth daily   minocycline (MINOCIN/DYNACIN) 100 MG capsule 6/5/2017  No Yes   Sig: Take 1  capsule (100 mg) by mouth 2 times daily for 10 days   mirtazapine (REMERON) 45 MG tablet 6/5/2017 at hs  No Yes   Sig: Take 1 tablet (45 mg) by mouth At Bedtime   naproxen (NAPROSYN) 500 MG tablet prn  No Yes   Sig: Take 1 tablet (500 mg) by mouth 2 times daily as needed for moderate pain   temazepam (RESTORIL) 15 MG capsule prn  No Yes   Sig: TAKE 1 CAPSULES BY MOUTH AT BETIME AS NEEDED TO SLEEP. MUST LAST 30 DAYS.   traMADol (ULTRAM) 50 MG tablet prn  No Yes   Sig: TAKE 1 TABLET BY MOUTH TWICE DAILY AS NEEDED FOR MODERATE PAIN   traZODone (DESYREL) 100 MG tablet 6/5/2017 at hs  No Yes   Sig: TAKE 1 TABLET (100 MG) BY MOUTH AT BEDTIME   valACYclovir (VALTREX) 1000 mg tablet prn  No Yes   Sig: TAKE 2 TABS EVERY 12 HRS FOR 1 DAY ONLY FOR OUTBREAKS OF COLD SORES as needed      Facility-Administered Medications: None[AB1.2]       ALLERGIES:[AB1.1]   Allergies   Allergen Reactions     Bactrim [Sulfamethoxazole W/Trimethoprim] Hives     Codeine Itching     NAUSEA     Morphine Itching     NAUSEA[AB1.2]       SOCIAL HISTORY:[AB1.1]  Social History   Substance Use Topics     Smoking status: Never Smoker     Smokeless tobacco: Never Used     Alcohol use 0.0 oz/week     0 Standard drinks or equivalent per week      Comment: 2-3 drinks per day[AB1.2]       FAMILY HISTORY:[AB1.1]  Family History   Problem Relation Age of Onset     Substance Abuse Father      CANCER Father      throat and lung mets     DIABETES No family hx of      Coronary Artery Disease No family hx of      CEREBROVASCULAR DISEASE No family hx of[AB1.2]        PHYSICAL EXAM:   General  Awake, alert oriented, tremors  Vital Signs with Ranges[AB1.1]  Temp: 98.5  F (36.9  C) Temp src: Oral BP: 110/70   Heart Rate: 81 Resp: 18 SpO2: 96 % O2 Device: None (Room air)    I/O last 3 completed shifts:  In: 850 [P.O.:750; I.V.:100]  Out: 335 [Urine:335][AB1.2]    Constitutional: healthy, alert and no distress   Cardiovascular: negative, PMI normal. No lifts, heaves, or  thrills. RRR. No murmurs, clicks gallops or rub  Respiratory: negative, Percussion normal. Good diaphragmatic excursion. Lungs clear  Head: Normocephalic. No masses, lesions, tenderness or abnormalities  Neck: Neck supple. No adenopathy. Thyroid symmetric, normal size,, Carotids without bruits.  Abdomen: Abdomen soft, non-tender. BS normal. No masses, Liver 3 fingers below costal margin.  NEURO: Gait normal. Reflexes normal and symmetric. Sensation grossly WNL.  SKIN: no suspicious lesions or rashes  LYMPH: Normal cervical lymph nodes          ADDITIONAL COMMENTS:   I reviewed the patient's new clinical lab test results.[AB1.1]   Recent Labs   Lab Test  06/09/17   0745  06/08/17   0746  06/07/17   0720   09/29/15   0840   04/11/12   0600   WBC  7.4  6.3  6.2   < >  5.6   < >   --    HGB  12.1  11.1*  11.3*   < >  10.1*   < >   --    MCV  102*  100  99   < >  92   < >   --    PLT  118*  143*  166   < >  327   < >   --    INR   --    --   1.02   --   0.86   --   0.93    < > = values in this interval not displayed.     Recent Labs   Lab Test  06/09/17   0745  06/08/17   0746  06/07/17 2010 06/07/17   0720   POTASSIUM  4.4  4.3  3.7   < >  2.8*   CHLORIDE  84*  85*   --    --   85*   CO2  34*  37*   --    --   36*   BUN  26  22   --    --   18   ANIONGAP  8  7   --    --   9    < > = values in this interval not displayed.     Recent Labs   Lab Test  06/09/17   1335  06/09/17   0745  06/08/17   0746  06/07/17   0720  06/06/17   2300   10/19/15   1447   11/06/10   1415   ALBUMIN   --   2.4*  2.3*  2.2*   --    < >   --    < >  3.7   BILITOTAL   --   5.4*  4.1*  2.4*   --    < >   --    < >  0.5   ALT   --   48  82*  115*   --    < >   --    < >  35   AST   --   110*  142*  212*   --    < >   --    < >  60*   PROTEIN  10*   --    --    --   Negative   --   Negative   < >   --    LIPASE   --    --    --    --    --    --    --    --   139    < > = values in this interval not displayed.[AB1.2]       I reviewed the  patient's new imaging results.        CONSULTATION ASSESSMENT AND PLAN:    Active Problems:    Cellulitis    Assessment: 73 year old female with acute on chronic hepatitis. Likely multifactorial. ETOH, drug induced likely antibiotics, and infection. Cholestatic and hepatitis mix pateren  Patient has signs of chronic liver disease and likely Cirrhosis. Possible malnutrition.  Patient has signs of possible withdrawal.      Plan: Continue on current plan.  Check autoimmune markers.  Awaiting hepatitis serologies.  Repeat labs including INR and ammonia levels.  Appreciate nephrology input. Hyponatremia.    Thank you very much for letting us participate in her care.[AB1.1]          Parth Graham MD[AB1.2], [facility]  [facility].  Office: [phone]  Cell : [phone][AB1.1]     Revision History        User Key Date/Time User Provider Type Action    > AB1.2 6/9/2017  7:54 PM Parth Graham MD Physician Sign     AB1.1 6/9/2017  7:41 PM Parth Graham MD Physician             Consults by Tesfaye العراقي MD at 6/9/2017  1:35 PM     Author:  Tesfaye العراقي MD Service:  Nephrology Author Type:  Physician    Filed:  6/9/2017  1:35 PM Date of Service:  6/9/2017  1:35 PM Creation Time:  6/9/2017 12:58 PM    Status:  Signed :  Tesfaye العراقي MD (Physician)     Consult Orders:    1. Nephrology IP Consult: Patient to be seen: Routine - within 24 hours; LYLY; Consultant may enter orders: Yes [[id number]] ordered by Nik Dejesus MD at 06/09/17 1057                RENAL CONSULTATION NOTE    REFERRING MD:  Nik Dejesus MD    REASON FOR CONSULTATION:  LYLY    HPI:  71 y.o woman with mild CAD, hypertension, hyperlipidemia, obesity s/p gastric bypass, who was admitted on 8/7 for bilateral lower extremities edema. Pt is had ativan earlier, and she is a little groggy now. Her daughter is at the bedside. She says the lower extremities edema started about three weeks ago.  "She also had an \"angry\" red looking skin in the lower extremities. She was diagnosed with cellulitis of the legs, and her PCP started clindamycin, then minocycline then Keflex. Despite these antibiotics she was not getting better, so she came in for further evaluation. She was started on a couple dose so Keflex. Currently, she is on Clindamycin. Per Dr. Arreguin's note, he recommended Keflex.     Pt states she has not bee feeling well for three weeks. She describes it as generalized malaise. She denies fever and chill. She did not notice any unusual urinary symptoms prior to admission. She denies dysuria. She was taking 500 mg daily for pain. She says there were no new medications. She does not have any cardiopulmonary complaints.     She was admitted on 6/7 with a Scr of 1 mg/dl, which is around her baseline. It increase to 1.19 mg/dl on 6/8 and 1.4 mg daily today. She noticed she is making less urine.     ROS:  A complete review of systems was performed and is negative except as noted above.    PMH:    Past Medical History:   Diagnosis Date     Alcohol abuse     long term alcohol abuse     Anxiety disorder      Bariatric surgery status 1996?    gastric bypass, [facility] and     Benign hypertension      Chronic insomnia      Chronic low back pain      Chronic pain syndrome     Chronic back and neck pain, chronic pain due to osteoarthritis multiple joints     Coronary artery disease 9/2015    mild distal branch disease on cath     Disc disease, degenerative, cervical     C4-C7 disc disease     Diverticulitis     inpatient therapy 6/2006     Hip joint replacement status 4/2004    right     Knee joint replacement status 12/2005    left     Macrocytic anemia     Mild macrocytic anemia, 2012 to present, likely based on alcohol abuse.     Major depressive disorder, single episode, severe, without mention of psychotic behavior      Mixed hyperlipidemia      Moderate aortic stenosis 5/2014    mild/mod AS with peak gradient " 33/mean gradient 20 mmHg, AV area 1.2     Pelvic relaxation disorder     Surgical intervention for cystocele/rectocele 3,11/2012     Personal history of urinary calculi 6/2006    left ureteral stone,lithotripsy     PVC (premature ventricular contraction)      Stage III chronic kidney disease 2005     SVT (supraventricular tachycardia) (H)     likely atrial tachycardia       PSH:    Past Surgical History:   Procedure Laterality Date     APPENDECTOMY  3/2004    incidental     C GASTRIC BYPASS,OBESE<100CM ARIANNA-EN-Y  1996     C MEDIASTINOSCOPY W OR WO BIOPSY  2/2008    Videomediastinoscopy and, for mediastinal adenopathy -reactive lymphoid hyperplasia     C REPAIR OF RECTOCELE  3/2012     C TOTAL KNEE ARTHROPLASTY  12/2005    left      CARPAL TUNNEL RELEASE RT/LT  10/2010    Carpometacarpal excisional arthroplasty with a fascial autograft and APL suspension sling (71471). 2. Left thumb metacarpophalangeal joint fusion with autologous bone graft (23917). 3. Left endoscopic carpal tunnel release      CHOLECYSTECTOMY, LAPOROSCOPIC  11/2010    Cholecystectomy, Laparoscopic     COLONOSCOPY N/A 9/8/2016    Procedure: COMBINED COLONOSCOPY, SINGLE OR MULTIPLE BIOPSY/POLYPECTOMY BY BIOPSY;  Surgeon: Moe Barlow MD;  Location:  GI     CYSTOCELE REPAIR  11/2012    davinci laparoscopic sacrocolpopexy, enterocele repair, lysis of adhesions, placement of retropubic mid urethral sling, cystoscopy     CYSTOSCOPY, LITHOTRIPSY, COMBINED  6/2006    Left extracorporeal shock wave lithotripsy, cystoscopy, left ureteral stent placement.     CYSTOSCOPY, REMOVE STENT(S), COMBINED  7/2006    Cystoscopy, removal of left ureteral stent, retrograde pyelography, flexible and rigid ureteroscopy and holmium laser lithotripsy, basket removal of stone fragments, ureteral stent placement.      HERNIA REPAIR  4/2012    bilateral augmentation mastopexy, ventral hernia repair, and medial thigh liposuction on 04/06/2012.      HYSTERECTOMY VAGINAL,  BILATERAL SALPINGO-OOPHERECTOMY, COMBINED  1998    due to myoma and bleeding     JOINT REPLACEMENT, HIP RT/LT  4/2004    right total hip arthroplasty     LAPAROTOMY, LYSIS ADHESIONS, COMBINED  3/2004    lysis adhesions, ventral hernia repair, appendectomy incidentally     LYMPH NODE BIOPSY  4/2008    right axillary, reactive follicular and paracortical hyperplasia.     MAMMOPLASTY AUGMENTATION BILATERAL  4/2012     REVISE RECONSTRUCTED BREAST  6/7/2012    Left breast capsulotomy.        MEDICATIONS:      vortioxetine  5 mg Oral Daily     clindamycin  300 mg Oral Q8H AMOR     aspirin EC  81 mg Oral Daily     gabapentin (NEURONTIN) tablet 600 mg  600 mg Oral At Bedtime     gabapentin (NEURONTIN) capsule 300 mg  300 mg Oral Daily     metoprolol  25 mg Oral Daily     mirtazapine  45 mg Oral At Bedtime     multivitamin, therapeutic  1 tablet Oral Daily     traZODone  100 mg Oral At Bedtime     sodium chloride (PF)  3 mL Intracatheter Q8H     folic acid  1 mg Oral Daily     vitamin  B-1  50 mg Oral Daily       ALLERGIES:    Allergies as of 06/06/2017 - Review Complete 06/06/2017   Allergen Reaction Noted     Bactrim [sulfamethoxazole w/trimethoprim] Hives 10/19/2015     Codeine Itching 12/03/2003     Morphine Itching 12/03/2003       FH:    Family History   Problem Relation Age of Onset     Substance Abuse Father      CANCER Father      throat and lung mets     DIABETES No family hx of      Coronary Artery Disease No family hx of      CEREBROVASCULAR DISEASE No family hx of        SH:    Social History     Social History     Marital status:      Spouse name: Mt     Number of children: 4     Years of education: 18     Occupational History     nurse       [employer]     Social History Main Topics     Smoking status: Never Smoker     Smokeless tobacco: Never Used     Alcohol use 0.0 oz/week     0 Standard drinks or equivalent per week      Comment: 2-3 drinks per day     Drug use: No     Sexual activity: Yes      "Partners: Male     Other Topics Concern     Blood Transfusions No     Caffeine Concern Yes     1-2 cups per day      Occupational Exposure Yes     blood     Hobby Hazards No     Sleep Concern Yes     Stress Concern Yes     Weight Concern Yes     gastric  byepass     Special Diet No     Back Care No     Exercise Yes     walk, swin     Bike Helmet No     Seat Belt Yes     Self-Exams Yes     Social History Narrative       PHYSICAL EXAM:    /70 (BP Location: Left arm)  Pulse 90  Temp 99  F (37.2  C) (Oral)  Resp 18  Ht 1.626 m (5' 4\")  Wt 85.7 kg (188 lb 15 oz)  SpO2 93%  BMI 32.43 kg/m2  GENERAL: NAD. Groggy.  HEENT:  Normocephalic. No gross abnormalities.  Pupils equal. Lips are dry.  CV: RRR, + AS murmur, no clicks, gallops, or rubs, 1+ edema, no carotid bruits  RESP: Poor airflow. No wheezes or crackles  GI: Abdomen obese, soft, NT  MUSCULOSKELETAL: 1+ bilateral LE edema. Redness seems better.  SKIN: Erythema improved  NEURO:  A?O x 3. Groggy.  PSYCH: mood good, affect appropriate  LYMPH: No palpable ant/post cervical    LABS:      CBC RESULTS:     Recent Labs  Lab 06/09/17  0745 06/08/17  0746 06/07/17  0720 06/06/17  1457   WBC 7.4 6.3 6.2 6.3   RBC 3.31* 3.01* 3.06* 3.43*   HGB 12.1 11.1* 11.3* 12.9   HCT 33.6* 30.2* 30.2* 34.8*   * 143* 166 217       BMP RESULTS:    Recent Labs  Lab 06/09/17  0745 06/08/17  0746 06/07/17 2010 06/07/17  1720 06/07/17  0720 06/06/17  2155 06/06/17  1457   * 129*  --   --  130*  --  129*   POTASSIUM 4.4 4.3 3.7 Canceled, Test credited Specimen Lost, Testing unable to be completedDIEGO BREAUX RN IN 66 NOTIFIED @ 2005 FK 2.8* 3.1* 2.5*   CHLORIDE 84* 85*  --   --  85*  --  80*   CO2 34* 37*  --   --  36*  --  31   BUN 26 22  --   --  18  --  20   CR 1.42* 1.19*  --   --  0.88 0.89 1.02   GLC 88 64*  --   --  66*  --  99   BIRGIT 8.5 8.3*  --   --  7.9*  --  8.8       INR  Recent Labs  Lab 06/07/17  0720   INR 1.02        DIAGNOSTICS:  Reviewed    LE " ultrasound: no DVT  TTE: Mod AS. Grade DD, normal EF    A/P:  71 y.o woman with CAD, hypertension, hyperlipidemia and obesity, admitted for bilateral LE cellulitis, consulted for LYLY.    1. Pt was admitted  On 6/7 with a serum creatinine of 1 mg/dl    2. LYLY. Presumed prerenal. Pt seems dry. She is not making much urine.     3. Bilateral cellulitis. On clindamycin. ID recommended Keflex    4. 1+ peripheral edema. TTE with diastolic dysfunction with preserved EF. Presumed from cellulitis for now.    5. Hyponatremia. This could be from psych medications, but I think she is dry.     6. Abnormal liver enzymes and TB?    Plan.   1. Check UA with micr, FENa, UPCR  2. Check dedicate renal ultrasound if kidney function continues to decline  3. Start NS at 125 cc x 1-2 liters  4. Decrease Toprol XL to 12.5 mg daily  5. Avoid NSAIDs  6. Avoid diuretic for now    Tesfaye العراقي MD  [facility] - Nephrology  Office Phone: [phone]  Pager: [phone][OP1.1]     Revision History        User Key Date/Time User Provider Type Action    > OP1.1 6/9/2017  1:35 PM Tesfaye العراقي MD Physician Sign            Consults by Colton Vargas MD at 6/9/2017 11:07 AM     Author:  Colton Vargas MD Service:  Psychiatry Author Type:  Physician    Filed:  6/9/2017 11:12 AM Date of Service:  6/9/2017 11:07 AM Creation Time:  6/9/2017 10:49 AM    Status:  Signed :  Colton Vargas MD (Physician)         [facility] Initial Psychiatric Consult Note      TIME SPENT IN PSYCHIATRY INITIAL CONSULT: 55 MINUTES    Consult ordered by: Nik Dejesus MD.  Reason: Depression, anxiety, EtOH.     Initial History     The patient's care was discussed, patient seen and chart notes were reviewed.    Patient examined for psychiatric consultation.     IDENTIFICATION    Pt is a 71 year old[TW1.1] engaged[TW1.2]  female with four children. Pt sees PCP Alison Macias. She has seen a psychiatrist in the past,  but does not currently see one now. She has had one previous CD treatment. Pt seen on 66 for depression, anxiety, and alcohol use.    HISTORY OF PRESENT ILLNESS     Ms. Kavitha Hartman is a pleasant 71-year-old female with past medical history of alcohol abuse, chronic pain syndrome on a pain agreement, depression, and anxiety who presented to [facility] for evaluation of bilateral lower extremity swelling and erythema.  She reported that usually her legs are not swollen, although she does take Lasix at home as needed for leg swelling.  She states that she started having more swelling in her lower extremities 2 weeks ago. She saw her PCP regarding this, but was unable to obtain relief, thus prompting her presentation to [facility]. She states that in August of this past year, she was unable to continue working as nurse and exacerbated her depression and anxiety with this change in her lifestyle. Pt has severely depressed mood, motivation poor, concentration poor, low energy, hopeless, helpless, anhedonic. Pt is an anxious person, a worrier. Pt endorses consistent, pervasive sense of anxiety and worry. Pt endorses symptoms related to excessive alcohol use, including over drinking, unsuccessful attempts to curb use, craving its use, having it interfere with work and personal relationships, and continued use despite known bad consequences. Pt also has increased tolerance. Her LFTs are elevated on admission, consistent with alcohol use.     CHEMICAL DEPENDENCY HISTORY    She reports drinking 2-3 cocktails per night.  She denies having history of alcohol withdrawal in the past. She states she had attended CD treatment seven years ago at [facility]. Utox not completed on admission.    PAST PSYCHIATRIC HISTORY    She is currently maintained on Remeron 45mg qhs, Trazodone 100mg, Temazepam 15mg, and Gabapentin 900mg.[TW1.1] No prior psychiatric hospitalizations.[TW1.2]    FAMILY HISTORY[TW1.1]    She states that many people in her family  "have unreported issues with alcohol. Pt reports that her daughter has issues with depression.[TW1.2]    SOCIAL HISTORY[TW1.1]    Pt grew up in [place] as the middle of five children. Pt's mother passed when she was [age]. 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 She has been  once. She had four children from this marriage. She reports that fijustice also uses alcohol and has had two DWIs.[TW1.2]     Medications     Prescriptions Prior to Admission   Medication Sig Dispense Refill Last Dose     GABAPENTIN PO Take 600 mg by mouth At Bedtime   6/5/2017 at hs     GABAPENTIN PO Take 300 mg by mouth daily as needed In the morning if needed   prn     cephALEXin (KEFLEX) 500 MG capsule Take 1 capsule (500 mg) by mouth 4 times daily for 10 days 40 capsule 0 6/5/2017     metoclopramide (REGLAN) 5 MG tablet Take 1 tablet (5 mg) by mouth 4 times daily as needed 40 tablet 0 6/5/2017 at x2     minocycline (MINOCIN/DYNACIN) 100 MG capsule Take 1 capsule (100 mg) by mouth 2 times daily for 10 days 20 capsule 0 6/5/2017     furosemide (LASIX) 20 MG tablet Take 1 tablet (20 mg) by mouth daily 90 tablet 1 6/5/2017 at am     traZODone (DESYREL) 100 MG tablet TAKE 1 TABLET (100 MG) BY MOUTH AT BEDTIME 90 tablet 1 6/5/2017 at hs     valACYclovir (VALTREX) 1000 mg tablet TAKE 2 TABS EVERY 12 HRS FOR 1 DAY ONLY FOR OUTBREAKS OF COLD SORES as needed 4 tablet 3 prn     temazepam (RESTORIL) 15 MG capsule TAKE 1 CAPSULES BY MOUTH AT BETIME AS NEEDED TO SLEEP. MUST LAST 30 DAYS. 30 capsule 5 prn     mirtazapine (REMERON) 45 MG tablet Take 1 tablet (45 mg) by mouth At Bedtime 90 tablet 1 6/5/2017 at hs     traMADol (ULTRAM) 50 MG tablet TAKE 1 TABLET BY MOUTH TWICE DAILY AS NEEDED FOR MODERATE PAIN 60 tablet 3 prn     naproxen (NAPROSYN) 500 MG tablet Take 1 tablet (500 mg) by mouth 2 times daily as needed for moderate pain 90 tablet 1 prn     metoprolol (TOPROL XL) 25 MG 24 hr tablet Take 1 " tablet (25 mg) by mouth daily 90 tablet 3 6/6/2017 at am     atorvastatin (LIPITOR) 40 MG tablet Take 1 tablet (40 mg) by mouth daily 90 tablet 1 6/5/2017 at hs     Multiple Vitamins-Minerals (CENTRUM SILVER) per tablet Take 1 tablet by mouth daily   6/5/2017     aspirin 81 MG tablet Take 81 mg by mouth daily   6/5/2017 at am     Potassium Chloride ER 20 MEQ TBCR Take 1 tablet (20 mEq) by mouth daily 30 tablet 3 NEW       Scheduled Medications    ceFAZolin  1 g Intravenous Q8H     aspirin EC  81 mg Oral Daily     gabapentin (NEURONTIN) tablet 600 mg  600 mg Oral At Bedtime     gabapentin (NEURONTIN) capsule 300 mg  300 mg Oral Daily     metoprolol  25 mg Oral Daily     mirtazapine  45 mg Oral At Bedtime     multivitamin, therapeutic  1 tablet Oral Daily     traZODone  100 mg Oral At Bedtime     sodium chloride (PF)  3 mL Intracatheter Q8H     enoxaparin  40 mg Subcutaneous Q24H     folic acid  1 mg Oral Daily     vitamin  B-1  50 mg Oral Daily     PRNs:  LORazepam **OR** LORazepam, potassium chloride, potassium chloride, potassium chloride, potassium chloride with lidocaine, potassium chloride, magnesium sulfate, acetaminophen, traMADol, temazepam, naloxone, lidocaine, lidocaine 4%, sodium chloride (PF), ibuprofen, ondansetron **OR** ondansetron      Allergies      Allergies   Allergen Reactions     Bactrim [Sulfamethoxazole W/Trimethoprim] Hives     Codeine Itching     NAUSEA     Morphine Itching     NAUSEA        Previous Medical History     Past Medical History:   Diagnosis Date     Alcohol abuse      Anxiety disorder      Bariatric surgery status 1996?     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/2005     Macrocytic anemia      Major depressive disorder, single episode, severe, without  "mention of psychotic behavior      Mixed hyperlipidemia      Moderate aortic stenosis 5/2014     Pelvic relaxation disorder      Personal history of urinary calculi 6/2006     PVC (premature ventricular contraction)      Stage III chronic kidney disease 2005     SVT (supraventricular tachycardia) (H)         Medical Review of Systems   /70 (BP Location: Left arm)  Pulse 90  Temp 99  F (37.2  C) (Oral)  Resp 18  Ht 1.626 m (5' 4\")  Wt 85.7 kg (188 lb 15 oz)  SpO2 93%  BMI 32.43 kg/m2  Body mass index is 32.43 kg/(m^2).  Previous 10-point ROS completed by Enedelia Rangel MD on 6/7/17 reviewed by Colton Vargas MD on June 9, 2017 and is unchanged except for those problems mentioned within the HPI.      Mental Status Examination     Appearance Lying in bed, dressed in gown. Appears stated age.   Attitude Cooperative   Orientation Oriented to person, place, time   Eye Contact Poor   Speech Regular rate, rhythm, volume and tone   Language Normal   Psychomotor Behavior Normal   Thought Process Goal-Oriented, Intact   Associations Intact   Thought Content Patient is currently negative for suicide ideation, negative for plan or intent, able to contract no self harm and identify barriers to suicide.  Negative for obsessions, compulsions or psychosis.      Mood Depressed   Affect Flat   Fund of Knowledge Average   Insight Impaired   Judgement Fair   Attention Span & Concentration Alert   Recent & Remote Memory Intact   Gait Normal      Labs   Labs reviewed.  Recent Results (from the past 24 hour(s))   Hepatic panel    Collection Time: 06/09/17  7:45 AM   Result Value Ref Range    Bilirubin Direct 4.8 (H) 0.0 - 0.2 mg/dL    Bilirubin Total 5.4 (H) 0.2 - 1.3 mg/dL    Albumin 2.4 (L) 3.4 - 5.0 g/dL    Protein Total 5.6 (L) 6.8 - 8.8 g/dL    Alkaline Phosphatase 219 (H) 40 - 150 U/L    ALT 48 0 - 50 U/L     (H) 0 - 45 U/L   CBC with platelets differential    Collection Time: 06/09/17  7:45 AM   Result Value " Ref Range    WBC 7.4 4.0 - 11.0 10e9/L    RBC Count 3.31 (L) 3.8 - 5.2 10e12/L    Hemoglobin 12.1 11.7 - 15.7 g/dL    Hematocrit 33.6 (L) 35.0 - 47.0 %     (H) 78 - 100 fl    MCH 36.6 (H) 26.5 - 33.0 pg    MCHC 36.0 31.5 - 36.5 g/dL    RDW 14.7 10.0 - 15.0 %    Platelet Count 118 (L) 150 - 450 10e9/L    Diff Method Automated Method     % Neutrophils 69.4 %    % Lymphocytes 13.4 %    % Monocytes 9.9 %    % Eosinophils 5.9 %    % Basophils 1.1 %    % Immature Granulocytes 0.3 %    Nucleated RBCs 1 (H) 0 /100    Absolute Neutrophil 5.2 1.6 - 8.3 10e9/L    Absolute Lymphocytes 1.0 0.8 - 5.3 10e9/L    Absolute Monocytes 0.7 0.0 - 1.3 10e9/L    Absolute Eosinophils 0.4 0.0 - 0.7 10e9/L    Absolute Basophils 0.1 0.0 - 0.2 10e9/L    Abs Immature Granulocytes 0.0 0 - 0.4 10e9/L    Absolute Nucleated RBC 0.1    Basic metabolic panel    Collection Time: 06/09/17  7:45 AM   Result Value Ref Range    Sodium 126 (L) 133 - 144 mmol/L    Potassium 4.4 3.4 - 5.3 mmol/L    Chloride 84 (L) 94 - 109 mmol/L    Carbon Dioxide 34 (H) 20 - 32 mmol/L    Anion Gap 8 3 - 14 mmol/L    Glucose 88 70 - 99 mg/dL    Urea Nitrogen 26 7 - 30 mg/dL    Creatinine 1.42 (H) 0.52 - 1.04 mg/dL    GFR Estimate 36 (L) >60 mL/min/1.7m2    GFR Estimate If Black 44 (L) >60 mL/min/1.7m2    Calcium 8.5 8.5 - 10.1 mg/dL        Impression   This is a 71 year old female with a history of depression and alcohol use. Discussed starting pt on Trintellix.  Reviewed the side effects, benefits, and complications of medication. Pt gave verbal agreement to begin Trintellix 5mg qam with intent to titrate to 10mg. Suggested that she discontinue the Temazepam. Dr. Vargas discussed the immediate negative effects of benzodiazapine use including increased fall risk and lowered IQ. Discussed addiction risk. Also mentioned the long-term detrimental effects including increased risk of dementia. Pt verbalized understanding. Begin Seroquel[TW1.1] 50[TW1.2]mg qhs and  12.5-25mg q2h prn. She will continue with her current psychotropic medications. Strongly suggested that she take Antabuse on discharge in order to curb alcohol use.    Diagnoses     1. Major depression, recurrent, severe, without psychotic features.  2. Alcohol use disorder, severe.     Plan     1. Explained side effects, benefits and complications of medications to the patient.  2. Medication Changes: Begin Trintellix 5mg qam with intent to titrate to 10mg. Add Seroquel[TW1.1] 50[TW1.2]mg qhs[TW1.1] with repeat x1[TW1.2] and 12.5-25mg q2h prn. Antabuse on discharge.  3. Discussed treatment plan with patient and team.  4. Re-consult Psychiatry.      TIME SPENT IN PSYCHIATRY INITIAL CONSULT: 55 MINUTES     Attestation:   Patient has been seen and evaluated by meColton MD.    Patient ID:  Name: Kavitha Headley MRN: [id number]  Admission: 6/6/2017  YOB: 1945[TW1.1]      Revision History        User Key Date/Time User Provider Type Action    > TW1.2 6/9/2017 11:12 AM Colton Vargas MD Physician Sign     TW1.1 6/9/2017 10:49 AM Colton Vargas MD Physician             Consults by Colton Vargas MD at 6/9/2017 10:49 AM     Author:  Colton Vargas MD Service:  Psychiatry Author Type:  Physician    Filed:  6/9/2017 10:49 AM Date of Service:  6/9/2017 10:49 AM Creation Time:  6/9/2017 10:48 AM    Status:  Signed :  Colton Vargas MD (Physician)     Consult Orders:    1. Psychiatry IP Consult: Depression and anxiety and EtOH; Consultant may enter orders: Yes; Patient to be seen: Routine; Call back #: [phone] [[id number]] ordered by Nik Dejesus MD at 06/08/17 1410                Initial Psychiatric Consult: Time Spent: 55 Minutes  Colton Vargas MD.[TW1.1]       Revision History        User Key Date/Time User Provider Type Action    > TW1.1 6/9/2017 10:49 AM Colton Vargas MD Physician Sign            Consults signed by Rodríguez,  Tono Mariano MD at 6/9/2017  9:19 AM      Author:  Tono Arreguin MD Service:  Infectious Disease Author Type:  Physician    Filed:  6/9/2017  9:19 AM Date of Service:  6/7/2017  7:36 AM Creation Time:  6/7/2017  8:03 AM    Status:  Signed :  Tono Arreguin MD (Physician)         INFECTIOUS DISEASE CONSULTATION        DATE OF SERVICE:  06/07/2017       REQUESTING PHYSICIAN:   Dr. Rangel      REASON FOR CONSULTATION:  I was asked by Dr. Rangel to assess bilateral lower leg cellulitis.      IMPRESSION:   1.  Kavitha Headley is a 71-year-old female with history of morbid obesity.  Status post gastric bypass surgery.   2.  History of alcohol abuse.   3.  Chronic pain syndrome.   4.  Admitted with a diagnosis of bilateral lower leg cellulitis with failed outpatient antibiotic therapy.  I suspect that this more likely represents venous stasis disease of the legs rather than cellulitis given the bilateral nature of the redness, as well as absence of fever and leukocytosis.  5.  Sulfa allergy.      RECOMMENDATIONS:   1.  Switch to IV Ancef for the present.   2.  I asked the patient to stress leg elevation.   3.  Will follow clinical progress.      HISTORY OF PRESENT ILLNESS:  This is a 71-year-old female with multiple medical problems including those listed above.  She has previous history of morbid obesity, for which she has had a gastric bypass.  She has had issues with leg swelling in the past and reports previous history of some leg cellulitis.  She takes p.r.n. Lasix for leg swelling.  Over the past 12 days she has noticed bilateral lower extremity swelling and redness.  She saw her primary care physician for this and was started on oral clindamycin which caused nausea.  She was then switched to minocycline and then Keflex was added.  Despite all of these antibiotics she continued to have lower extremity swelling and redness and thus she was admitted to the hospital.  She has not had any fever or  chills.  Admission white blood count was normal.  She has been started on IV vancomycin.  Admission blood cultures are negative to date.      PAST MEDICAL HISTORY:   1.  Hypertension.   2.  Hyperlipidemia.   3.  Morbid obesity.  Status post gastric bypass.   4.  History of alcohol abuse.   5.  Chronic pain syndrome -- on pain agreement.   6.  Macrocytic anemia.   7.  Aortic stenosis.   8.  Chronic kidney disease.   9.  History of supraventricular tachycardia.   10.  Depression/anxiety.      ALLERGIES:  Bactrim -- rash.      SOCIAL HISTORY:  Lives at home.  Nonsmoker.  Two to 3 cocktails per night.      FAMILY HISTORY:  Father had COPD and lung cancer.  Mother  of a blood disorder.      REVIEW OF SYSTEMS:  Negative other than that described above.      PHYSICAL EXAMINATION:   VITAL SIGNS:  Temperature 99.1, blood pressure 106/54, heart rate 96 and regular.   GENERAL:  Well-developed, well-nourished, alert and oriented, does not look acutely ill.   SKIN:  No rashes or nodules.   HEENT:  Eyes:  No subconjunctival hemorrhages or scleral icterus.  Oropharynx without erythema or exudate.   NECK:  Supple, no thyromegaly.   LYMPH:  No cervical or axillary lymphadenopathy.   LUNGS:  Clear to auscultation, no use of accessory muscles of respiration.   COR:  S1, S2 normal, no S3, S4 or murmur.   ABDOMEN:  Soft, nontender, no mass or hepatosplenomegaly.   EXTREMITIES:  There is mild bilateral lower extremity erythema with 2+ lower leg edema bilaterally.  There is no warmth to the skin.      For further details of the patient's history, please refer to the chart.  Thank you very much for this consultation.         MARCIA BRANDON MD             D: 2017 07:36   T: 2017 08:02   MT: [id number]      Name:     DARWIN JASSO   MRN:      [id number]        Account:       [id number]   :      1945           Consult Date:  2017      Document: [id number]       cc: Enedelia Rangel MD   [SO1.1]   Revision History         User Key Date/Time User Provider Type Action    > SO1.1 6/9/2017  9:19 AM Tono Arreguin MD Physician Sign            Consults by Tono Arreguin MD at 6/7/2017  7:37 AM     Author:  Tono Arreguin MD Service:  (none) Author Type:  Physician    Filed:  6/7/2017  7:37 AM Date of Service:  6/7/2017  7:37 AM Creation Time:  6/7/2017  7:36 AM    Status:  Signed :  Tono Arreguin MD (Physician)         ID consult dictated.  More likely venous stasis than cellulitis.[SO1.1]     Revision History        User Key Date/Time User Provider Type Action    > SO1.1 6/7/2017  7:37 AM Tono Arreguin MD Physician Sign                     Progress Notes - Physician (Notes for yesterday and today)      Progress Notes by Nahum Pa MD at 6/15/2017  5:05 PM     Author:  Nahum Pa MD Service:  Hem/Onc Author Type:  Physician    Filed:  6/15/2017  5:12 PM Date of Service:  6/15/2017  5:05 PM Creation Time:  6/15/2017  5:05 PM    Status:  Signed :  Nahum Pa MD (Physician)         Ms. Kavitha Headley is a 71-year-old female who has been admitted to the hospital with bilateral lower extremity cellulitis and abnormal liver function tests.  The patient has multiple medical problems, including hypertension, dyslipidemia and alcohol abuse.      Hematology is following for acute thrombocytopenia.  HIT has been ruled out.  Thrombocytopenia is multifactorial from combination of antibiotics, liver disease, alcohol abuse and low-grade DIC.     Patient is stable.  Denies any bleeding from ear, nose or throat.  No blood in the urine or stool.  No fever.  No chills.    CT of abdomen and pelvis was done on 06/14/2017.  There is diffuse fatty infiltration of liver.  No intrahepatic or extrahepatic biliary dilatation.  No pancreatic abnormality.  Spleen size is normal.  There is a scattered bilateral inguinal lymph node.  Largest is 2.6 cm.  It appears to have a fatty hilum.  Probably  benign.     Labs: CBC ordered.     Assessment and plan:  1.  Acute thrombocytopenia from combination of decompensated liver disease, alcohol abuse, low-grade DIC.  -Monitor CBC.  Transfuse platelet if bleeding or platelet below 20.  -We will consider bone marrow biopsy if there is worsening of thrombocytopenia or develops new cytopenia.     2.  Alcohol abuse.  -Psychiatry following. Advised not to resume alcohol.       3.  Patient had few questions which were answered.  We will continue to follow her.[BK1.1]     Revision History        User Key Date/Time User Provider Type Action    > BK1.1 6/15/2017  5:12 PM Nahum Pa MD Physician Sign            Progress Notes by Parth Graham MD at 6/14/2017  8:00 AM     Author:  Parth Graham MD Service:  Gastroenterology Author Type:  Physician    Filed:  6/15/2017  5:08 PM Date of Service:  6/14/2017  8:00 AM Creation Time:  6/15/2017  5:05 PM    Status:  Signed :  Parth Graham MD (Physician)         [facility]  Gastroenterology Progress Note     Kavitha Headley MRN# [id number]   YOB: 1945 Age: 71 year old          Assessment and Plan:   ETOH hepatitis and likely cirrhosis. Patient had EGD and EUS done yesterday. Patient appears awake ans skaky till. No astrixis. Patient had increase in LFTs today.   I will recommend to check  HSV studies are pending. EBV IgG positive.  CT scan today.  Psych consult for anxiety and Med changes if needed.   Continue on supportive care.   Continue on albumin.            Cellulitis of right lower extremity  Hypokalemia  Elevated LFTs  Thrombocytopenia (H)      Interval History:   no new complaints, denies shortness of breath, alert, oriented to person, place and time and has had a bowel movement in the last 24 hours              Review of Systems:   C: NEGATIVE for fever, chills, change in weight  E/M: NEGATIVE for ear, mouth and throat problems  R: NEGATIVE for significant  cough or SOB  CV: NEGATIVE for chest pain, palpitations or peripheral edema             Medications:   I have reviewed this patient's current medications    mirtazapine  15 mg Oral At Bedtime     QUEtiapine  25 mg Oral At Bedtime     lactulose  10 g Oral BID     sucralfate  1 g Oral 4x Daily AC & HS     ranitidine  150 mg Oral BID     magnesium oxide  400 mg Oral Daily     vitamin  B-1  100 mg Oral Daily     vortioxetine  5 mg Oral Daily     metoprolol  12.5 mg Oral Daily     gabapentin (NEURONTIN) tablet 600 mg  600 mg Oral At Bedtime     gabapentin (NEURONTIN) capsule 300 mg  300 mg Oral Daily     multivitamin, therapeutic  1 tablet Oral Daily     sodium chloride (PF)  3 mL Intracatheter Q8H     folic acid  1 mg Oral Daily                  Physical Exam:   Vitals were reviewed  Vital Signs with Ranges  Temp:  [97.5  F (36.4  C)-98.9  F (37.2  C)] 98.9  F (37.2  C)  Heart Rate:  [] 103  Resp:  [16-18] 18  BP: (107-137)/(73-85) 137/82  SpO2:  [94 %-99 %] 94 %  I/O last 3 completed shifts:  In: -   Out: 3150 [Urine:3150]  Cardiovascular: negative, PMI normal. No lifts, heaves, or thrills. RRR. No murmurs, clicks gallops or rub  Respiratory: negative, Percussion normal. Good diaphragmatic excursion. Lungs clear  Head: Normocephalic. No masses, lesions, tenderness or abnormalities  Neck: Neck supple. No adenopathy. Thyroid symmetric, normal size,, Carotids without bruits.  Abdomen: Abdomen soft, non-tender. BS normal. No masses, organomegaly  SKIN: no suspicious lesions or rashes           Data:   I reviewed the patient's new clinical lab test results.   Recent Labs   Lab Test  06/14/17   0812  06/13/17   0800  06/12/17   0730  06/11/17   1540   06/10/17   0823   06/07/17   0720   WBC   --   7.2  5.9   --    --   7.2   < >  6.2   HGB   --   11.7  11.3*   --    --   11.3*   < >  11.3*   MCV   --   103*  100   --    --   100   < >  99   PLT  61*  64*  51*   --    < >  77*   < >  166   INR   --    --    --   1.15*    --   1.19*   --   1.02    < > = values in this interval not displayed.     Recent Labs   Lab Test  06/14/17   0812  06/13/17   0800  06/12/17   0730   POTASSIUM  4.2  4.7  4.5   CHLORIDE  97  96  98   CO2  33*  32  32   BUN  10  13  12   ANIONGAP  4  4  3     Recent Labs   Lab Test  06/14/17   0812  06/13/17   1350  06/13/17   0800  06/12/17   0730   06/09/17   1335   06/06/17   2300   11/06/10   1415   ALBUMIN  2.5*   --   2.4*  2.1*   < >   --    < >   --    < >  3.7   BILITOTAL  8.1*   --   8.8*  5.2*   < >   --    < >   --    < >  0.5   ALT  25   --   22  18   < >   --    < >   --    < >  35   AST  65*   --   71*  54*   < >   --    < >   --    < >  60*   PROTEIN   --   30*   --    --    --   10*   --   Negative   < >   --    LIPASE   --    --    --    --    --    --    --    --    --   139    < > = values in this interval not displayed.       I reviewed the patient's new imaging results.    All laboratory data reviewed  All imaging studies reviewed by me.    Parth Graham MD,  6/15/2017  [facility]  Office : [phone]  Cell: [phone][AB1.1]     Revision History        User Key Date/Time User Provider Type Action    > AB1.1 6/15/2017  5:08 PM Parth Graham MD Physician Sign            Progress Notes by Parth Graham MD at 6/15/2017 10:10 AM     Author:  Parth Graham MD Service:  Gastroenterology Author Type:  Physician    Filed:  6/15/2017  5:04 PM Date of Service:  6/15/2017 10:10 AM Creation Time:  6/15/2017  4:59 PM    Status:  Signed :  Parth Graham MD (Physician)         [facility]  Gastroenterology Progress Note     Kavitha Headley MRN# [id number]   YOB: 1945 Age: 71 year old          Assessment and Plan:     Cellulitis   Clinically the same. Feeling little mor energy. C/O nausea last night.  W/U from Chronic liver disease fairly unremarkable. Finding consitent with Chronic ETOH liver disease with  acute ETOH/sepsis/drug induced liver injury.  Repeat labs tomorrow including CBC, CMP,PT,INR.  Will continue albumin till tomorrow.  Appreciate greatly Psych input and help.  Will continue to follow along. Thrombocytopenia likely from portal HTN.                  Cellulitis of right lower extremity  Hypokalemia  Elevated LFTs  Thrombocytopenia (H)      Interval History:   no new complaints, denies chest pain, denies shortness of breath, alert, oriented to person, place and time and has had a bowel movement in the last 24 hours              Review of Systems:   C: NEGATIVE for fever, chills, change in weight  E/M: NEGATIVE for ear, mouth and throat problems  R: NEGATIVE for significant cough or SOB  CV: NEGATIVE for chest pain, palpitations or peripheral edema             Medications:   I have reviewed this patient's current medications    mirtazapine  15 mg Oral At Bedtime     QUEtiapine  25 mg Oral At Bedtime     lactulose  10 g Oral BID     sucralfate  1 g Oral 4x Daily AC & HS     ranitidine  150 mg Oral BID     magnesium oxide  400 mg Oral Daily     vitamin  B-1  100 mg Oral Daily     vortioxetine  5 mg Oral Daily     metoprolol  12.5 mg Oral Daily     gabapentin (NEURONTIN) tablet 600 mg  600 mg Oral At Bedtime     gabapentin (NEURONTIN) capsule 300 mg  300 mg Oral Daily     multivitamin, therapeutic  1 tablet Oral Daily     sodium chloride (PF)  3 mL Intracatheter Q8H     folic acid  1 mg Oral Daily                  Physical Exam:   Vitals were reviewed  Vital Signs with Ranges  Temp:  [97.5  F (36.4  C)-98.9  F (37.2  C)] 98.9  F (37.2  C)  Heart Rate:  [] 103  Resp:  [16-18] 18  BP: (107-137)/(73-85) 137/82  SpO2:  [94 %-99 %] 94 %  I/O last 3 completed shifts:  In: -   Out: 3150 [Urine:3150]  Cardiovascular: negative, PMI normal. No lifts, heaves, or thrills. RRR. No murmurs, clicks gallops or rub  Respiratory: negative, Percussion normal. Good diaphragmatic excursion. Lungs clear  Head:  Normocephalic. No masses, lesions, tenderness or abnormalities  Neck: Neck supple. No adenopathy. Thyroid symmetric, normal size,, Carotids without bruits.  Abdomen: Abdomen soft, non-tender. BS normal. No masses, organomegaly  SKIN: no suspicious lesions or rashes           Data:   I reviewed the patient's new clinical lab test results.   Recent Labs   Lab Test  06/14/17   0812  06/13/17   0800 06/12/17   0730  06/11/17   1540   06/10/17   0823   06/07/17   0720   WBC   --   7.2  5.9   --    --   7.2   < >  6.2   HGB   --   11.7  11.3*   --    --   11.3*   < >  11.3*   MCV   --   103*  100   --    --   100   < >  99   PLT  61*  64*  51*   --    < >  77*   < >  166   INR   --    --    --   1.15*   --   1.19*   --   1.02    < > = values in this interval not displayed.     Recent Labs   Lab Test  06/14/17   0812 06/13/17   0800 06/12/17   0730   POTASSIUM  4.2  4.7  4.5   CHLORIDE  97  96  98   CO2  33*  32  32   BUN  10  13  12   ANIONGAP  4  4  3     Recent Labs   Lab Test  06/14/17 0812 06/13/17   1350  06/13/17   0800  06/12/17   0730   06/09/17   1335   06/06/17   2300   11/06/10   1415   ALBUMIN  2.5*   --   2.4*  2.1*   < >   --    < >   --    < >  3.7   BILITOTAL  8.1*   --   8.8*  5.2*   < >   --    < >   --    < >  0.5   ALT  25   --   22  18   < >   --    < >   --    < >  35   AST  65*   --   71*  54*   < >   --    < >   --    < >  60*   PROTEIN   --   30*   --    --    --   10*   --   Negative   < >   --    LIPASE   --    --    --    --    --    --    --    --    --   139    < > = values in this interval not displayed.       I reviewed the patient's new imaging results.    All laboratory data reviewed  All imaging studies reviewed by me.    Parth Graham MD,  6/15/2017  [facility]  Office : [phone]  Cell: [phone][AB1.1]     Revision History        User Key Date/Time User Provider Type Action    > AB1.1 6/15/2017  5:04 PM Parth Graham MD Physician Sign             Progress Notes by Nan Macias DO at 6/15/2017 10:55 AM     Author:  Nan Macias DO Service:  Hospitalist Author Type:  Physician    Filed:  6/15/2017 12:04 PM Date of Service:  6/15/2017 10:55 AM Creation Time:  6/15/2017 10:55 AM    Status:  Signed :  Nan Macias DO (Physician)         [facility]    Hospitalist Progress Note    Assessment & Plan   Kavitha Headley is a 71 year old female with PMHx of hypertension, dyslipidemia, moderate aortic stenosis, depression, anxiety, EtOH use, chronic back pain, recent LE cellulitis and oral antibiotics who was admitted on 6/6/2017 with worsening LE erythema and swelling. Additionally, she was found to have abnl LFTs.    Abnl LFTs:  LFTs elevated on admission.   Suspected secondary to acute alcoholic hepatitis, possibly underlying liver disease or early cirrhosis though underwent additional workup given cholestatic picture (raising concerns for med related vs autoimmune vs obstructive)  US this stay showed no acute findings other than some fatty infiltration. No nodularities.   Viral hepatitis panel neg, antimitochondiral Ab neg, CMV neg, EBV IgG positive, HSV pending, iron and ferritin nl  Statin held on admission, was initially placed on Ancef as below, though that was subsequently dc'd  GI consulted -- seen by Dr. Graham  Had EGD and EUS on 6/12 which showed a friable distal esophagus which was biopsied; otherwise had nl appearing intrahepatic biliary ducts, CBD not well visualized, no obvious pancreatic mass  Ammonia has been at the high range of nl -- started on lactulose on 6/13  CT abd/pelvis obtained on 6/14 and showed diffuse fatty infiltration throughout the liver, no biliary duct dilation, no visible pancreatic abnl and some nonspecific inguinal lymphadenopathy[KW1.1]    -- AST remains mildly elevated but stable, alk phos stable, t.biliruin (predominantly direct bili) trending up- unclear  why given lack of findings on serial imaging studies[KW1.2]  -- should remain off possibly offending medications  -- repeat CMP in AM  -- cont lactulose (no need to repeat ammonia level)    Bilateral LE Cellulitis vs Worsening Chronic Venous Stasis:  Recently treated for cellulitis prior to admission with clindamycin, then switched to minocycline and Keflex dt reported intolerance. On admission, patient endorsed worsening LE erythema and swelling. Was afebrile, CBC nl. Bilateral US neg for DVT.   IV Vancomycin was started on admission -- seen by ID and was changed to Ancef on 6/7, this was ultimately stopped on 6/9, was then transitioned to clindamycin (ID recommended Keflex but this was not used given concerns for exacerbating LFTs)    -- completed course of antibiotic therapy on 6/13  -- mgmt of LE edema as below    Chronic Bilateral LE Edema:  PTA: Lasix 20mg po daily.   Edema likely dt underlying liver disease and low albumin. No evidence of pulmonary edema.   Echo this stay showed intact EF (>70%), though with impaired LV relaxation, moderate AS and a mildly dilated ascending aorta  Was given dose of IV Lasix on admission -- subsequent doses held given development of LYLY and hyponatremia  Was transiently given IVFs -- these were stopped on 6/11  Resumed on Lasix with albumin on 6/13 and was given 3 doses, renal function tolerating thus far, breathing improved    -- cont elevation of LEs, low Na diet, nutritional supplements    Acute Kidney Injury:  Cr reportedly normal prior to admission. Peaked at 1.4 this stay after initial diuresis  Seen by nephrology this stay -- felt she was intravascularly volume depleted and was started on NS.   Cr improved on NS but edema worsening, IVFs dc'd 6/11.    -- monitoring renal function with resumption of diuretics and albumin (given total of 3 doses from 6/13 - 6/14)    Thrombocytopenia:  Etiology not completely clear -- thought to be possibly secondary to underlying liver  disease  Platelet count dropped as low as 51, has since started to improve  Lovenox dc'd, HIT Ab panel neg  Had associated elevated d-dimer, low fibrinogen and mildly elevated INR -- clinical picture concerning for possible mild DIC, discussed w/Dr. Pa, recommended BM biopsy if platelet count conts to drop    -- suspect thrombocytopenia likely multifactorial -- dt liver disease, antibiotics, EtOH, low grade DIC  -- monitor daily CBC, transfuse platelets if <20 (or <50 if needing procedure)    EtOH Abuse:  Reported drinking 2-3 cocktails/night. Denied prior hx of withdrawal. Workup this stay suggestive of chronic EtOH use.   Was notably tremulous and unsteady on 6/8 and started on CIWA protocol -- no overt signs of withdrawal so this was dc'd.     -- psych following this stay -- recommended hospital-based inpatient stay for ongoing care, best option may be [facility]  -- cont thiamine/folate/MVI    Depression / Anxiety:  Psych following this stay.   Started on Trintellex and Seroquel, Seroquel stopped dt concern it may be contributing to elevated LFTs    -- medications have been adjusted -- per psych assessment on 6/15, conts on Trintellix and Seroquel 25mg HS plus 15mg TID prn; stopped trazodone and Restoril, Remeron dose decreased to 15mg HS[KW1.1]  -- minimize use of prn Seroquel as able so as not to exacerbate abnl LFTs[KW1.2]    Moderate Aortic Stenosis:  Noted on echo this stay.   Should follow up with cardiology after discharge    Hypertension:  Chronic and stable on Metoprolol    Dyslipidemia:  Holding statin this stay as shaylee given abnl LFTs    Insomnia:[KW1.1]  Has prns[KW1.2]    Chronic Back Pain:  On narcotic agreement.  Remains on gabapentin (300mg daily, 600mg HS) and prn Tramadol (though dose decreased to 25mg TID prn)    FEN: no IVFs, lytes stable, regular diet  DVT Prophylaxis: PCDs only, Lovenox stopped given thrombocytopenia  Code Status: Full Code    Disposition: Repeat labs in AM. Discharge  pending clinical stability -- ?1-2 more days. Will have SW evaluate nina inpatient stay at [facility] is an option (given her medical complexity), otherwise dc to TCU to regain strength, then ultimately [facility].    Nan Macias    Interval History   Complained of nausea and tremors overnight -- started on prn Reglan[KW1.1].   Seen this morning. Feeling okay -- appetite marginal with intermittent nausea but has been trying to drink Ensure shakes. Also able to tolerate some yogurt earlier this morning. LE edema improved. Says BMs have been hard.[KW1.2]    -Data reviewed today: I reviewed all new labs and imaging results over the last 24 hours. I personally reviewed no images or EKG's today.    Physical Exam   Temp: 98.9  F (37.2  C) Temp src: Oral BP: 107/73   Heart Rate: 102 Resp: 16 SpO2: 96 % O2 Device: None (Room air)    Vitals:    06/13/17 0626 06/14/17 0649 06/15/17 0702   Weight: 89.7 kg (197 lb 12 oz) 90.5 kg (199 lb 8.3 oz) 88.9 kg (195 lb 15.8 oz)     Vital Signs with Ranges  Temp:  [97.5  F (36.4  C)-98.9  F (37.2  C)] 98.9  F (37.2  C)  Heart Rate:  [] 102  Resp:  [16-18] 16  BP: (107-134)/(73-85) 107/73  SpO2:  [95 %-99 %] 96 %  I/O last 3 completed shifts:  In: 670 [P.O.:670]  Out: 2850 [Urine:2850]    Constitutional: Resting comfortably,[KW1.1] alert and answering questions appropriately, NAD[KW1.2]  Respiratory:[KW1.1] CTAB, no wheeze/rales/rhonchi[KW1.2]  Cardiovascular:[KW1.1] HRRR w/+SM[KW1.2]  GI:[KW1.1] S, NT, ND, +BS[KW1.2]  Skin/Integumen:[KW1.1] +jaundiced, scattered ecchymosis on extremities[KW1.2]  Other:[KW1.1] +bilateral LE pitting edema to the knees[KW1.2]    Medications        mirtazapine  15 mg Oral At Bedtime     QUEtiapine  25 mg Oral At Bedtime     lactulose  10 g Oral BID     sucralfate  1 g Oral 4x Daily AC & HS     ranitidine  150 mg Oral BID     magnesium oxide  400 mg Oral Daily     vitamin  B-1  100 mg Oral Daily     vortioxetine  5 mg Oral Daily     metoprolol   12.5 mg Oral Daily     gabapentin (NEURONTIN) tablet 600 mg  600 mg Oral At Bedtime     gabapentin (NEURONTIN) capsule 300 mg  300 mg Oral Daily     multivitamin, therapeutic  1 tablet Oral Daily     sodium chloride (PF)  3 mL Intracatheter Q8H     folic acid  1 mg Oral Daily       Data     Recent Labs  Lab 06/15/17  0822 06/14/17  0812 06/13/17  1445 06/13/17  0800 06/12/17  0730 06/11/17  1540  06/10/17  0823   WBC  --   --   --  7.2 5.9  --   --  7.2   HGB  --   --   --  11.7 11.3*  --   --  11.3*   MCV  --   --   --  103* 100  --   --  100   PLT  --  61*  --  64* 51*  --   < > 77*   INR  --   --   --   --   --  1.15*  --  1.19*   NA  --  134 131* 132* 133  --   < > 131*   POTASSIUM  --  4.2  --  4.7 4.5  --   < > 3.8   CHLORIDE  --  97  --  96 98  --   < > 92*   CO2  --  33*  --  32 32  --   < > 31   BUN  --  10  --  13 12  --   < > 20   CR 0.87 0.96 1.00 1.05* 0.92  --   < > 1.04   ANIONGAP  --  4  --  4 3  --   < > 8   BIRGIT  --  8.2*  --  8.4* 7.7*  --   < > 7.8*   GLC  --  74  --  88 75  --   < > 75   ALBUMIN  --  2.5*  --  2.4* 2.1*  --   < > 2.0*   PROTTOTAL  --  5.3*  --  5.7* 5.0*  --   < > 5.0*   BILITOTAL  --  8.1*  --  8.8* 5.2*  --   < > 4.2*   ALKPHOS  --  213*  --  262* 227*  --   < > 215*   ALT  --  25  --  22 18  --   < > 20   AST  --  65*  --  71* 54*  --   < > 66*   < > = values in this interval not displayed.    Recent Results (from the past 24 hour(s))   CT Abdomen Pelvis w Contrast    Narrative    CT ABDOMEN AND PELVIS WITH CONTRAST 6/14/2017 4:16 PM    HISTORY: 71-year-old patient with obstructive jaundice and  thrombocytopenia. Request made for evaluation of hepatobiliary tree,  pancreas, and splenomegaly.    COMPARISON: April 10, 2012.    TECHNIQUE: Axial and coronal CT images obtained from the lung bases  through the abdomen and pelvis after the uneventful administration of  Isovue-370 intravenous contrast given for a total of 50 mL. Radiation  dose for this scan was reduced using  automated exposure control,  adjustment of the mA and/or kV according to patient size, or iterative  reconstruction technique.    FINDINGS: Bilateral breast implants are again identified, partially  visible. Lung bases are unremarkable. Heart size is normal without  pericardial effusion. Right total hip arthroplasty. Intervertebral  disc space narrowing throughout the lumbar spine. No acute osseous  abnormality.    Diffuse fatty infiltration throughout the liver, not readily  identified on previous exam. Cholecystectomy clips. No evidence of  intrahepatic or extrahepatic biliary dilatation. No pancreatic ductal  dilatation. Postsurgical changes with gastrojejunostomy. Spleen is  normal in size measuring up to 9.4 cm. The adrenal glands and kidneys  appear normal.    Cortical thinning of the left kidney, compared to the right, though no  hydronephrosis. Bladder is partially distended and unremarkable.  Status post hysterectomy. No dilated loops of bowel. Scattered  bilateral inguinal lymph nodes incidentally identified, the largest of  which measures up to 2.6 x 1.4 cm, though appears to have a fatty  hilum. Patient did have variable degree of lymph nodes in similar  location on previous exam, suspect minimally larger on today's exam.      Impression    IMPRESSION:  1. Diffuse fatty infiltration throughout the liver. No evidence of  intrahepatic or extrahepatic biliary dilatation. No visible pancreatic  abnormality. Spleen is normal in size.  2. Cortical thinning of the left kidney, relative to the right, though  unchanged compared to previous exam.  3. Enlarged inguinal lymph nodes, though some of the larger lymph  nodes have fatty leilani. Lymph nodes may be minimally increased in size  compared to previous exam. They may be reactive, though nonspecific.    ZEYNEP CASTRO MD[KW1.1]          Revision History        User Key Date/Time User Provider Type Action    > KW1.2 6/15/2017 12:04 PM Nan Macias DO  Physician Sign     KW1.1 6/15/2017 10:55 AM Nan Macias,  Physician             Progress Notes by Nicho Goins MD at 6/15/2017  2:57 AM     Author:  Nicho Goins MD Service:  Hospitalist Author Type:  Physician    Filed:  6/15/2017  2:58 AM Date of Service:  6/15/2017  2:57 AM Creation Time:  6/15/2017  2:57 AM    Status:  Signed :  Nicho Goins MD (Physician)         HOSPITALIST Cross Cover    Called by RN about tremors, nausea. Chart reviewed.     Patient requesting reglan.    Will start reglan at 5 mg IV TID PRN.    Will also add prn ativan for nausea, tremors.    NICHO GOINS MD[PG1.1]     Revision History        User Key Date/Time User Provider Type Action    > PG1.1 6/15/2017  2:58 AM Nicho Goins MD Physician Sign                  Procedure Notes     No notes of this type exist for this encounter.      Progress Notes - Therapies (Notes from 06/13/17 through 06/16/17)     No notes of this type exist for this encounter.                                          INTERAGENCY TRANSFER FORM - LAB / IMAGING / EKG / EMG RESULTS   6/6/2017                       [place] [address] [facility]: [phone]            Unresulted Labs (24h ago through future)    Start       Ordered    06/10/17 0600  Platelet count  (Pharmacological Prophylaxis - enoxaparin (LOVENOX) *Use only if creatinine clearance is greater than 30 mL/min)  EVERY THREE DAYS,   Routine     Comments:  Repeat every 3 days while on VTE prophylaxis.  Notify provider and hold enoxaparin if platelet count falls by 50% of baseline. If no result is listed, this lab has not been done the past 365 days. LATEST LAB RESULT: Platelet Count (10e9/L)       Date                     Value                 06/06/2017               217              ----------    06/06/17 2109    06/09/17 0000  Creatinine  (Pharmacological Prophylaxis - enoxaparin (LOVENOX) *Use only if creatinine clearance is greater than 30 mL/min)   "EVERY THREE DAYS,   Routine     Comments:  Repeat every 3 days while on VTE prophylaxis.    06/06/17 2101    Unscheduled  Potassium  (Potassium Replacement - \"Standard\" - For K levels less than 3.4 mmol/L - UU,UR,UA,RH,SH,PH,WY )  CONDITIONAL (SPECIFY),   Routine     Comments:  Obtain Potassium Level for these conditions:  *IF no potassium result within 24 hours before initiation of order set, draw potassium level with next lab collect.    *2 HOURS AFTER last IV potassium replacement dose and 4 hours after an oral replacement dose.  *Next morning after potassium dose.     Repeat Potassium Replacement if necessary.    06/07/17 1030         Lab Results - 3 Days      CBC with platelets [[id number]] (Abnormal)  Resulted: 06/16/17 1019, Result status: Final result    Ordering provider: Nan Macias DO  06/16/17 0000 Resulting lab: [facility]    Specimen Information    Type Source Collected On   Blood  06/16/17 0845          Components       Value Reference Range Flag Lab   WBC 6.4 4.0 - 11.0 10e9/L  FrStHsLb   RBC Count 2.55 3.8 - 5.2 10e12/L L FrStHsLb   Hemoglobin 9.2 11.7 - 15.7 g/dL L FrStHsLb   Hematocrit 25.4 35.0 - 47.0 % L FrStHsLb    78 - 100 fl  FrStHsLb   MCH 36.1 26.5 - 33.0 pg H FrStHsLb   MCHC 36.2 31.5 - 36.5 g/dL  FrStHsLb   RDW 15.3 10.0 - 15.0 % H FrStHsLb   Platelet Count 106 150 - 450 10e9/L L FrStHsLb            Comprehensive metabolic panel [[id number]] (Abnormal)  Resulted: 06/16/17 0940, Result status: Final result    Ordering provider: Nan Macias DO  06/16/17 0000 Resulting lab: [facility]    Specimen Information    Type Source Collected On   Blood  06/16/17 0845          Components       Value Reference Range Flag Lab   Sodium 137 133 - 144 mmol/L  FrStHsLb   Potassium 3.5 3.4 - 5.3 mmol/L  FrStHsLb   Chloride 98 94 - 109 mmol/L  FrStHsLb   Carbon Dioxide 32 20 - 32 mmol/L  FrStHsLb   Anion Gap 7 3 - 14 mmol/L  FrStHsLb   Glucose " 89 70 - 99 mg/dL  FrStHsLb   Urea Nitrogen 5 7 - 30 mg/dL L FrStHsLb   Creatinine 0.74 0.52 - 1.04 mg/dL  FrStHsLb   GFR Estimate 76 >60 mL/min/1.7m2  FrStHsLb   Comment:  Non  GFR Calc   GFR Estimate If Black -- >60 mL/min/1.7m2  FrStHsLb   Result:         >90   GFR Calc     Calcium 8.1 8.5 - 10.1 mg/dL L FrStHsLb   Result:     Bilirubin Total 5.8 0.2 - 1.3 mg/dL H FrStHsLb   Albumin 2.4 3.4 - 5.0 g/dL L FrStHsLb   Protein Total 5.2 6.8 - 8.8 g/dL L FrStHsLb   Alkaline Phosphatase 154 40 - 150 U/L H FrStHsLb   ALT 39 0 - 50 U/L  FrStHsLb   AST 89 0 - 45 U/L H FrStHsLb            INR (AM Draw) [[id number]]  Resulted: 06/16/17 0921, Result status: Final result    Ordering provider: Parth Graham MD  06/16/17 0000 Resulting lab: [facility]    Specimen Information    Type Source Collected On   Blood  06/16/17 0845          Components       Value Reference Range Flag Lab   INR 1.03 0.86 - 1.14  FrStHsLb            CBC with platelets differential [[id number]] (Abnormal)  Resulted: 06/15/17 1837, Result status: Final result    Ordering provider: Nahum Pa MD  06/15/17 1516 Resulting lab: [facility]    Specimen Information    Type Source Collected On   Blood  06/15/17 1714          Components       Value Reference Range Flag Lab   WBC 6.7 4.0 - 11.0 10e9/L  FrStHsLb   RBC Count 2.78 3.8 - 5.2 10e12/L L FrStHsLb   Hemoglobin 10.0 11.7 - 15.7 g/dL L FrStHsLb   Hematocrit 27.8 35.0 - 47.0 % L FrStHsLb    78 - 100 fl  FrStHsLb   MCH 36.0 26.5 - 33.0 pg H FrStHsLb   MCHC 36.0 31.5 - 36.5 g/dL  FrStHsLb   RDW 15.4 10.0 - 15.0 % H FrStHsLb   Platelet Count 111 150 - 450 10e9/L L FrStHsLb   Comment:  Giant Platelets present   Diff Method Manual Differential   FrStHsLb   % Neutrophils 61.0 %  FrStHsLb   % Lymphocytes 22.0 %  FrStHsLb   % Monocytes 9.0 %  FrStHsLb   % Eosinophils 6.0 %  FrStHsLb   % Basophils 2.0 %  FrStHsLb   Absolute Neutrophil  4.1 1.6 - 8.3 10e9/L  FrStHsLb   Absolute Lymphocytes 1.5 0.8 - 5.3 10e9/L  FrStHsLb   Absolute Monocytes 0.6 0.0 - 1.3 10e9/L  FrStHsLb   Absolute Eosinophils 0.4 0.0 - 0.7 10e9/L  FrStHsLb   Absolute Basophils 0.1 0.0 - 0.2 10e9/L  FrStHsLb   RBC Morphology Consistent with reported results   FrStHsLb   Platelet Estimate Confirming automated cell count   FrStHsLb            Hepatic panel [[id number]] (Abnormal)  Resulted: 06/15/17 1740, Result status: Final result    Ordering provider: Nahum Pa MD  06/15/17 1517 Resulting lab: [facility]    Specimen Information    Type Source Collected On   Blood  06/15/17 1714          Components       Value Reference Range Flag Lab   Bilirubin Direct 6.2 0.0 - 0.2 mg/dL H FrStHsLb   Bilirubin Total 7.1 0.2 - 1.3 mg/dL H FrStHsLb   Albumin 2.9 3.4 - 5.0 g/dL L FrStHsLb   Protein Total 5.9 6.8 - 8.8 g/dL L FrStHsLb   Alkaline Phosphatase 196 40 - 150 U/L H FrStHsLb   ALT 39 0 - 50 U/L  FrStHsLb   AST 96 0 - 45 U/L H FrStHsLb            Surgical pathology exam [[id number]]  Resulted: 06/15/17 1450, Result status: Edited Result - FINAL    Ordering provider: Abhishek Graham MD  06/12/17 1635 Resulting lab: [facility]    Specimen Information    Type Source Collected On   Tissue Esophagus 06/12/17 1610          Components       Value Reference Range Flag Lab   Copath Report --      Result:         Patient Name: DARWIN JASSO  MR#: [id number]  Specimen #: [id number]  Collected: 6/12/2017  Received: 6/13/2017  Reported: 6/14/2017 13:45  Ordering Phy(s): ABHISHEK GRAHAM    For improved result formatting, select 'View Enhanced Report Format'  under Linked Documents section.    ***ORIGINAL REPORT FOLLOWS ADDENDUM***    ADDENDUM    TO ORIGINAL REPORT  Status: Signed Out  Date Ordered:6/15/2017  Date Complete:6/15/2017  Date Reported:6/15/2017 14:49  Signed Out By: Marvel Liz M.D.    COMMENTS:  A GMS stain for fungal organisms is negative.  The diagnosis  "remains  unchanged.    __________________________________________    ORIGINAL REPORT:    SPECIMEN(S):  Esophageal ulcer biopsy    FINAL DIAGNOSIS:  Esophagus, not otherwise specified, biopsy  - Nonneoplastic esophageal mucosa with ulceration, reactive epithelial  atypia, special stain for fungal organisms pending    Electronically signed out by:    Marvel Liz M.D.    GROSS:  The specimen is received in formalin with  proper patient identification  labeled \"esophageal ulcer biopsy\".  The specimen consists of pink tissue  fragment measuring up to 0.2 cm.  The specimen is entirely submitted in  one cassette. (Dictated by: Galo Angeles 6/13/2017 08:18 AM)    MICROSCOPIC:  Sections show benign acutely inflamed esophageal mucosa exhibiting  reactive atypia.  Acutely inflamed granulation tissue compatible with  ulcer base is also identified.  Viral inclusions are not observed.  There is no evidence of malignancy.  Special stain for fungal organisms  will be performed, the results of which will be issued in a supplemental  report.    CPT Codes:  A: 28920-WJ8, 20410-UKQ    TESTING LAB LOCATION:  [address]  [address] [phone]    COLLECTION SITE:  Client: [facility]  Location: [facility] ([facility])              Creatinine [[id number]]  Resulted: 06/15/17 0854, Result status: Final result    Ordering provider: Enedelia Rangel MD  06/14/17 1800 Resulting lab: [facility]    Specimen Information    Type Source Collected On   Blood  06/15/17 0822          Components       Value Reference Range Flag Lab   Creatinine 0.87 0.52 - 1.04 mg/dL  FrStHsLb   GFR Estimate 64 >60 mL/min/1.7m2  FrStHsLb   Comment:  Non  GFR Calc   GFR Estimate If Black 78 >60 mL/min/1.7m2  FrStHsLb   Comment:   GFR Calc            Folate [[id number]]  Resulted: 06/14/17 1424, Result status: Final result    Ordering provider: Thierno" MD Nahum  06/14/17 0000 Resulting lab: [facility]    Specimen Information    Type Source Collected On   Blood  06/14/17 0812          Components       Value Reference Range Flag Lab   Folate 26.9 >5.4 ng/mL  51            Vitamin B12 [[id number]] (Abnormal)  Resulted: 06/14/17 1408, Result status: Final result    Ordering provider: Nahum Pa MD  06/14/17 0000 Resulting lab: [facility]    Specimen Information    Type Source Collected On   Blood  06/14/17 0812          Components       Value Reference Range Flag Lab   Vitamin B12 1832 193 - 986 pg/mL H 51            EBV Capsid Antibody IgG [[id number]] (Abnormal)  Resulted: 06/14/17 1230, Result status: Final result    Ordering provider: Enedelia Rangel MD  06/13/17 1453 Resulting lab: [facility]    Specimen Information    Type Source Collected On     06/13/17 2115          Components       Value Reference Range Flag Lab   EBV Capsid Antibody IgG -- 0.0 - 0.8 AI H 51   Result:         >8.0  Positive, suggests recent or past exposure   Antibody index (AI) values reflect qualitative changes in antibody   concentration that cannot be directly associated with clinical condition or   disease state.              Bilirubin direct [[id number]] (Abnormal)  Resulted: 06/14/17 1114, Result status: Final result    Ordering provider: Lina Caldera MD  06/14/17 0812 Resulting lab: [facility]    Specimen Information    Type Source Collected On     06/14/17 0812          Components       Value Reference Range Flag Lab   Bilirubin Direct 6.9 0.0 - 0.2 mg/dL H FrStHsLb            Platelet count [[id number]] (Abnormal)  Resulted: 06/14/17 0945, Result status: Final result    Ordering provider: Lina Caldera MD  06/14/17 0000 Resulting lab: [facility]    Specimen Information    Type Source Collected On   Blood  06/14/17 0812           Components       Value Reference Range Flag Lab   Platelet Count 61 150 - 450 10e9/L L FrStHsLb            Iron and iron binding capacity [[id number]] (Abnormal)  Resulted: 06/14/17 0851, Result status: Final result    Ordering provider: Nahum Pa MD  06/14/17 0000 Resulting lab: [facility]    Specimen Information    Type Source Collected On   Blood  06/14/17 0812          Components       Value Reference Range Flag Lab   Iron 123 35 - 180 ug/dL  FrStHsLb   Iron Binding Cap 126 240 - 430 ug/dL L FrStHsLb   Iron Saturation Index 98 15 - 46 % H FrStHsLb            Lactate Dehydrogenase [[id number]]  Resulted: 06/14/17 0851, Result status: Final result    Ordering provider: Nahum Pa MD  06/14/17 0000 Resulting lab: [facility]    Specimen Information    Type Source Collected On   Blood  06/14/17 0812          Components       Value Reference Range Flag Lab   Lactate Dehydrogenase 202 81 - 234 U/L  FrStHsLb            Ferritin [[id number]]  Resulted: 06/14/17 0844, Result status: Final result    Ordering provider: aNhum Pa MD  06/14/17 0000 Resulting lab: [facility]    Specimen Information    Type Source Collected On   Blood  06/14/17 0812          Components       Value Reference Range Flag Lab   Ferritin 167 8 - 252 ng/mL  FrStHsLb            Comprehensive metabolic panel [[id number]] (Abnormal)  Resulted: 06/14/17 0844, Result status: Final result    Ordering provider: Lina Caldera MD  06/14/17 0000 Resulting lab: [facility]    Specimen Information    Type Source Collected On   Blood  06/14/17 0812          Components       Value Reference Range Flag Lab   Sodium 134 133 - 144 mmol/L  FrStHsLb   Potassium 4.2 3.4 - 5.3 mmol/L  FrStHsLb   Chloride 97 94 - 109 mmol/L  FrStHsLb   Carbon Dioxide 33 20 - 32 mmol/L H FrStHsLb   Anion Gap 4 3 - 14 mmol/L  FrStHsLb   Glucose 74 70 - 99 mg/dL  FrStHsLb   Urea Nitrogen 10 7 - 30 mg/dL   FrStHsLb   Creatinine 0.96 0.52 - 1.04 mg/dL  FrStHsLb   GFR Estimate 57 >60 mL/min/1.7m2 L FrStHsLb   Comment:  Non  GFR Calc   GFR Estimate If Black 69 >60 mL/min/1.7m2  FrStHsLb   Comment:  African American GFR Calc   Calcium 8.2 8.5 - 10.1 mg/dL L FrStHsLb   Bilirubin Total 8.1 0.2 - 1.3 mg/dL H FrStHsLb   Albumin 2.5 3.4 - 5.0 g/dL L FrStHsLb   Protein Total 5.3 6.8 - 8.8 g/dL L FrStHsLb   Alkaline Phosphatase 213 40 - 150 U/L H FrStHsLb   ALT 25 0 - 50 U/L  FrStHsLb   AST 65 0 - 45 U/L H FrStHsLb            CMV antibody IgM [[id number]]  Resulted: 06/14/17 0819, Result status: Final result    Ordering provider: Lina Caldera MD  06/13/17 0000 Resulting lab: [facility]    Specimen Information    Type Source Collected On   Blood  06/13/17 0800          Components       Value Reference Range Flag Lab   CMV Antibody IgM -- 0.0 - 0.8 AI  51   Result:         <0.2  Negative   Antibody index (AI) values reflect qualitative changes in antibody   concentration that cannot be directly associated with clinical condition or   disease state.              EBV Capsid Antibody IgG [[id number]] (Abnormal)  Resulted: 06/14/17 0819, Result status: Final result    Ordering provider: Lina Caldera MD  06/13/17 0800 Resulting lab: [facility]    Specimen Information    Type Source Collected On     06/13/17 0800          Components       Value Reference Range Flag Lab   EBV Capsid Antibody IgG -- 0.0 - 0.8 AI H 51   Result:         >8.0  Positive, suggests recent or past exposure   Antibody index (AI) values reflect qualitative changes in antibody   concentration that cannot be directly associated with clinical condition or   disease state.              Ammonia [[id number]]  Resulted: 06/13/17 1554, Result status: Final result    Ordering provider: Lina Caldera MD  06/13/17 1359 Resulting lab: [facility]    Specimen  Information    Type Source Collected On   Blood  06/13/17 1445          Components       Value Reference Range Flag Lab   Ammonia 45 10 - 50 umol/L  FrStHsLb   Comment:  TESTING DELAYED INTERPRET RESULTS WITH CAUTION 1550 CK            Fractional excretion of sodium [[id number]]  Resulted: 06/13/17 1552, Result status: Final result    Ordering provider: Tesfaye العراقي MD  06/09/17 1225 Resulting lab: [facility]    Specimen Information    Type Source Collected On   Urine Urine Midstream 06/13/17 1350          Components       Value Reference Range Flag Lab   Creatinine Urine 177 mg/dL  FrStHsLb   Sodium Urine mmol/L 6 mmol/L  FrStHsLb   %FENA -- %  FrStHsLb   Result:         <0.1  Adult:    <1 percent             Indicates prerenal azotemia             >3 percent             Suggests acute tubular             necrosis   Neonates: <2.5 percent             Suggest prerenal azotemia             >2.5 percent             Suggest acute tubular necrosis              Bld morphology pathology review [[id number]]  Resulted: 06/13/17 1535, Result status: Final result    Ordering provider: Christie Pa MD  06/13/17 1223 Resulting lab: [facility]    Specimen Information    Type Source Collected On   Blood  06/13/17 0800          Components       Value Reference Range Flag Lab   Copath Report --      Result:         Patient Name: DARWIN JASSO  MR#: [id number]  Specimen #: [id number]  Collected: 6/13/2017  Received: 6/13/2017  Reported: 6/13/2017 15:33  Ordering Phy(s): ARASH GUERRA  Additional Phy(s): CHRISTIE PA    For improved result formatting, select 'View Enhanced Report Format'  under Linked Documents section.    TEST(S):  Peripheral Smear Morphology    FINAL DIAGNOSIS:    Peripheral blood examination  - Macrocytosis of red cells  - Moderate thrombocytopenia  - No blast cells seen    COMMENT:    Correlation with the clinical findings to evaluate the macrocytosis  including serum B12 and folate  levels, liver function tests, the thyroid  hormone level and other aspect of the clinical history.  The cause of  the thrombocytopenia is not apparent based on the peripheral blood smear  review.    Electronically signed out by:    Kin Bridges M.D.    PERIPHERAL BLOOD DATA:  <<<<<    PERIPHERAL BLOOD DATA  Patient Value (Reference Range >18 year old female)  7.15 WBC (4.0-11.0 x 10*9/L)   3.23 RBC (3.8-5.2 x 10*12/L)  11.7 HGB (11.7-15.7 g/dL)  33.1 HCT (35.0-47.0 %)  105.2 MCV (78-100fL)  36.2 MCH (26.5-33.0 pg)  35.3 MCHC (31.5-36.5 g/dL)  15.6 RDW (10.0-15.0 %)  61 PLT (150-450 x 10*9/L)  2.97 Retic (0.5-2.0%)    PERIPHERAL BLOOD DIFFERENTIAL  (Reference ranges >18 year old)    Absolute  4.11 Neutrophils,segmented and bands  (1.6 - 8.3 x 10*9/L)  1.67 Lymphocytes  (0.8 - 5.3 x 10*9/L)  0.98 Monocytes  (0 -1.3 x 10*9/L)  0.29 Eosinophils  (0 - 0.7 x 10*9/L)  0.07 Basophils  (0 - 0.2 x 10*9/L)    PERIPHERAL MORPHOLOGY:    ERYTHROCYTES: Macrocytic normochromic red cells are present in the  peripheral blood smear.  No significant anisocytosis or poikilocytosis  is seen.  Rouleaux formation is absent.    LEUKOCYTES: The granulocytes show normal nuclear lobulation and normal  cytoplasmic granularity without dysplastic changes.  The lymphocytes  show small mature forms.  The monocytes show normal morphology.  No  blast cells are seen.    PLATELETS: Platelets are present i n moderately decreased numbers and  show mildly increased size in which platelet size approaches that of red  blood cells.    >>>>>    CPT Codes:  A: 21761-AWBK    TESTING LAB LOCATION:  [address]  [address] [phone]    COLLECTION SITE:  Client:  [facility]  Location:  66 (S)              Sodium [[id number]] (Abnormal)  Resulted: 06/13/17 1518, Result status: Final result    Ordering provider: Parth Graham MD  06/13/17 1402 Resulting lab: [facility]  HOSP[facility]    Specimen Information    Type Source Collected On   Blood  06/13/17 1445          Components       Value Reference Range Flag Lab   Sodium 131 133 - 144 mmol/L L FrStHsLb            Creatinine [[id number]] (Abnormal)  Resulted: 06/13/17 1518, Result status: Final result    Ordering provider: Parth Graham MD  06/13/17 1402 Resulting lab: [facility]    Specimen Information    Type Source Collected On   Blood  06/13/17 1445          Components       Value Reference Range Flag Lab   Creatinine 1.00 0.52 - 1.04 mg/dL  FrStHsLb   GFR Estimate 54 >60 mL/min/1.7m2 L FrStHsLb   Comment:  Non  GFR Calc   GFR Estimate If Black 66 >60 mL/min/1.7m2  FrStHsLb   Comment:   GFR Calc            UA with Microscopic reflex to Culture [[id number]] (Abnormal)  Resulted: 06/13/17 1432, Result status: Final result    Ordering provider: Lina Caldera MD  06/13/17 0888 Resulting lab: [facility]    Specimen Information    Type Source Collected On   Urine Urine Midstream 06/13/17 1350          Components       Value Reference Range Flag Lab   Color Urine Brown   FrStHsLb   Appearance Urine Slightly Cloudy   FrStHsLb   Glucose Urine Negative NEG mg/dL  FrStHsLb   Bilirubin Urine -- NEG A FrStHsLb   Result:         Moderate   This is an unconfirmed screening test result. A positive result may be false.     Ketones Urine Negative NEG mg/dL  FrStHsLb   Result:     Specific Gravity Urine 1.019 1.003 - 1.035  FrStHsLb   Blood Urine Negative NEG  FrStHsLb   pH Urine 6.0 5.0 - 7.0 pH  FrStHsLb   Protein Albumin Urine 30 NEG mg/dL A FrStHsLb   Urobilinogen mg/dL Normal 0.0 - 2.0 mg/dL  FrStHsLb   Nitrite Urine Negative NEG  FrStHsLb   Leukocyte Esterase Urine Small NEG A FrStHsLb   Source Midstream Urine   FrStHsLb   WBC Urine 6 0 - 2 /HPF H FrStHsLb   RBC Urine 2 0 - 2 /HPF  FrStHsLb   Squamous Epithelial /HPF Urine 4 0 - 1 /HPF H FrStHsLb   Mucous Urine Present  NEG /LPF A FrStHsLb   Hyaline Casts 3 0 - 2 /LPF H FrStHsLb   Calcium Oxalate Few NEG /HPF A FrStHsLb            Reticulocyte count [[id number]] (Abnormal)  Resulted: 06/13/17 1240, Result status: Final result    Ordering provider: Lina Caldera MD  06/13/17 0800 Resulting lab: [facility]    Specimen Information    Type Source Collected On     06/13/17 0800          Components       Value Reference Range Flag Lab   % Retic 3.0 0.5 - 2.0 % H FrStHsLb   Absolute Retic 95.6 25 - 95 10e9/L H FrStHsLb            CBC with platelets differential [[id number]] (Abnormal)  Resulted: 06/13/17 0957, Result status: Final result    Ordering provider: Lina Caldera MD  06/13/17 0000 Resulting lab: [facility]    Specimen Information    Type Source Collected On   Blood  06/13/17 0800          Components       Value Reference Range Flag Lab   WBC 7.2 4.0 - 11.0 10e9/L  FrStHsLb   RBC Count 3.23 3.8 - 5.2 10e12/L L FrStHsLb   Hemoglobin 11.7 11.7 - 15.7 g/dL  FrStHsLb   Hematocrit 33.1 35.0 - 47.0 % L FrStHsLb    78 - 100 fl H FrStHsLb   MCH 36.2 26.5 - 33.0 pg H FrStHsLb   MCHC 35.3 31.5 - 36.5 g/dL  FrStHsLb   RDW 15.6 10.0 - 15.0 % H FrStHsLb   Platelet Count 64 150 - 450 10e9/L L FrStHsLb   Diff Method Automated Method   FrStHsLb   % Neutrophils 57.4 %  FrStHsLb   % Lymphocytes 23.4 %  FrStHsLb   % Monocytes 13.7 %  FrStHsLb   % Eosinophils 4.1 %  FrStHsLb   % Basophils 1.0 %  FrStHsLb   % Immature Granulocytes 0.4 %  FrStHsLb   Nucleated RBCs 0 0 /100  FrStHsLb   Absolute Neutrophil 4.1 1.6 - 8.3 10e9/L  FrStHsLb   Absolute Lymphocytes 1.7 0.8 - 5.3 10e9/L  FrStHsLb   Absolute Monocytes 1.0 0.0 - 1.3 10e9/L  FrStHsLb   Absolute Eosinophils 0.3 0.0 - 0.7 10e9/L  FrStHsLb   Absolute Basophils 0.1 0.0 - 0.2 10e9/L  FrStHsLb   Abs Immature Granulocytes 0.0 0 - 0.4 10e9/L  FrStHsLb   Absolute Nucleated RBC 0.0   FrStHsLb   Platelet Estimate Confirming automated cell count   FrStHsLb             Comprehensive metabolic panel [[id number]] (Abnormal)  Resulted: 06/13/17 0840, Result status: Final result    Ordering provider: Lina Caldera MD  06/13/17 0000 Resulting lab: [facility]    Specimen Information    Type Source Collected On   Blood  06/13/17 0800          Components       Value Reference Range Flag Lab   Sodium 132 133 - 144 mmol/L L FrStHsLb   Potassium 4.7 3.4 - 5.3 mmol/L  FrStHsLb   Chloride 96 94 - 109 mmol/L  FrStHsLb   Carbon Dioxide 32 20 - 32 mmol/L  FrStHsLb   Anion Gap 4 3 - 14 mmol/L  FrStHsLb   Glucose 88 70 - 99 mg/dL  FrStHsLb   Urea Nitrogen 13 7 - 30 mg/dL  FrStHsLb   Creatinine 1.05 0.52 - 1.04 mg/dL H FrStHsLb   GFR Estimate 51 >60 mL/min/1.7m2 L FrStHsLb   Comment:  Non  GFR Calc   GFR Estimate If Black 62 >60 mL/min/1.7m2  FrStHsLb   Comment:  African American GFR Calc   Calcium 8.4 8.5 - 10.1 mg/dL L FrStHsLb   Bilirubin Total 8.8 0.2 - 1.3 mg/dL H FrStHsLb   Albumin 2.4 3.4 - 5.0 g/dL L FrStHsLb   Protein Total 5.7 6.8 - 8.8 g/dL L FrStHsLb   Alkaline Phosphatase 262 40 - 150 U/L H FrStHsLb   ALT 22 0 - 50 U/L  FrStHsLb   AST 71 0 - 45 U/L H FrStHsLb            HSV IgM antibody [[id number]]  Resulted: 06/13/17 0809, Result status: In process    Ordering provider: Lina Caldera MD  06/13/17 0000 Resulting lab: [facility]    Specimen Information    Type Source Collected On   Blood  06/13/17 0800            Testing Performed By     Lab - Abbreviation Name Director Address Valid Date Range    14 - FrStHsLb [facility] Unknown [address] [place] [address] 05/08/15 1057 - Present    45 - RUG402 [facility] Unknown Unknown 01/28/02 0000 - Present    51 - Unknown [facility] Unknown [address] [address] 12/31/14 1010 - Present    88 - Unknown [facility] Unknown Unknown 10/30/02 0000 - Present               Imaging Results - 3 Days      CT Abdomen Pelvis w Contrast [[id number]]  Resulted:  06/14/17 1700, Result status: Final result    Ordering provider: Lina Caldera MD  06/14/17 0948 Resulted by: Zohaib Mosley MD    Performed: 06/14/17 1615 - 06/14/17 1616 Resulting lab: RADIOLOGY RESULTS    Narrative:       CT ABDOMEN AND PELVIS WITH CONTRAST 6/14/2017 4:16 PM    HISTORY: 71-year-old patient with obstructive jaundice and  thrombocytopenia. Request made for evaluation of hepatobiliary tree,  pancreas, and splenomegaly.    COMPARISON: April 10, 2012.    TECHNIQUE: Axial and coronal CT images obtained from the lung bases  through the abdomen and pelvis after the uneventful administration of  Isovue-370 intravenous contrast given for a total of 50 mL. Radiation  dose for this scan was reduced using automated exposure control,  adjustment of the mA and/or kV according to patient size, or iterative  reconstruction technique.    FINDINGS: Bilateral breast implants are again identified, partially  visible. Lung bases are unremarkable. Heart size is normal without  pericardial effusion. Right total hip arthroplasty. Intervertebral  disc space narrowing throughout the lumbar spine. No acute osseous  abnormality.    Diffuse fatty infiltration throughout the liver, not readily  identified on previous exam. Cholecystectomy clips. No evidence of  intrahepatic or extrahepatic biliary dilatation. No pancreatic ductal  dilatation. Postsurgical changes with gastrojejunostomy. Spleen is  normal in size measuring up to 9.4 cm. The adrenal glands and kidneys  appear normal.    Cortical thinning of the left kidney, compared to the right, though no  hydronephrosis. Bladder is partially distended and unremarkable.  Status post hysterectomy. No dilated loops of bowel. Scattered  bilateral inguinal lymph nodes incidentally identified, the largest of  which measures up to 2.6 x 1.4 cm, though appears to have a fatty  hilum. Patient did have variable degree of lymph nodes in similar  location on previous exam,  suspect minimally larger on today's exam.      Impression:       IMPRESSION:  1. Diffuse fatty infiltration throughout the liver. No evidence of  intrahepatic or extrahepatic biliary dilatation. No visible pancreatic  abnormality. Spleen is normal in size.  2. Cortical thinning of the left kidney, relative to the right, though  unchanged compared to previous exam.  3. Enlarged inguinal lymph nodes, though some of the larger lymph  nodes have fatty leilani. Lymph nodes may be minimally increased in size  compared to previous exam. They may be reactive, though nonspecific.    ZEYNEP CASTRO MD      Testing Performed By     Lab - Abbreviation Name Director Address Valid Date Range    104 - Rad Rslts RADIOLOGY RESULTS Unknown Unknown 05 1553 - Present               ECG/EMG Results      Echocardiogram Complete [[id number]]  Resulted: 17 0851, Result status: Edited Result - FINAL    Ordering provider: Enedelia Medrano MD  17 210 Resulted by: Kishore Bond MD    Performed: 17 0850 - 17 0937 Resulting lab: RADIOLOGY RESULTS    Narrative:       [id number]  [facility]  [id number]  [id number]^MITCHELL^ENEDELIA^RODRIGO           [facility]  Echocardiography Laboratory  [address] [place] [address]        Name: DARWIN JASSO  MRN: [id number]  : 1945  Study Date: 2017 08:51 AM  Age: 71 yrs  Gender: Female  Patient Location: [facility]  Reason For Study: Aortic Valve Disorder  Ordering Physician: ENEDELIA MEDRANO  Referring Physician: Alison Pena  Performed By: Laurel Jordan RDCS     BSA: 1.9 m2  Height: 64 in  Weight: 187 lb  HR: 91  BP: 113/71 mmHg  _____________________________________________________________________________  __        Procedure  Complete Portable Echo Adult.  _____________________________________________________________________________  __        Interpretation Summary     Hyperdynamic left ventricular functionThe visual ejection fraction is  estimated at  >70%.The transmitral spectral Doppler flow pattern is suggestive  of impaired LV relaxation.  The right ventricular systolic function is normal.  The left atrium is mild to moderately dilated.  Moderate valvular aortic stenosis.Mean gradient 29 mm hg, RENE 1.1 cm2.  The ascending aorta is mildly dilated.     Compared to echo dated 08/04/2015 no singificant change, aortic valve  gradients and AV area appear similar.  _____________________________________________________________________________  __        Left Ventricle  The left ventricle is normal in size. There is normal left ventricular wall  thickness. The transmitral spectral Doppler flow pattern is suggestive of  impaired LV relaxation. Hyperdynamic left ventricular function. The visual  ejection fraction is estimated at >70%. No regional wall motion abnormalities  noted.     Right Ventricle  The right ventricle is normal size. Thickened right ventricle free wall, mild.  The right ventricular systolic function is normal.     Atria  The left atrium is mild to moderately dilated. Right atrial size is normal.  There is no color Doppler evidence of an atrial shunt.     Mitral Valve  There is mild mitral annular calcification. There is trace mitral  regurgitation.        Tricuspid Valve  Right ventricular systolic pressure could not be approximated due to  inadequate tricuspid regurgitation. There is trace tricuspid regurgitation.     Aortic Valve  The aortic valve is trileaflet. Moderate valvular aortic stenosis. Mean  [id number] mm hg, RENE 1.1 cm2.     Pulmonic Valve  The pulmonic valve is not well visualized. There is no pulmonic valvular  stenosis.     Vessels  The aortic root is normal size. The ascending aorta is Mildly dilated. 4.1 cm.  Inferior vena cava not well visualized for estimation of right atrial  pressure.     Pericardium  There is no pericardial effusion.         Rhythm  sinus.  _____________________________________________________________________________  __  MMode/2D Measurements & Calculations  IVSd: 0.98 cm     LVIDd: 4.0 cm  LVIDs: 2.7 cm  LVPWd: 0.97 cm  FS: 31.2 %  EDV(Teich): 69.3 ml  ESV(Teich): 28.1 ml  LV mass(C)d: 121.7 grams  LV mass(C)dI: 64.0 grams/m2  Ao root diam: 2.9 cm  LA dimension: 4.1 cm  asc Aorta Diam: 4.1 cm  LA/Ao: 1.4  LVOT diam: 2.1 cm  LVOT area: 3.3 cm2  LA Volume (BP): 45.6 ml  LA Volume Index (BP): 24.0 ml/m2           Doppler Measurements & Calculations  MV E max hermes: 99.0 cm/sec  MV A max hermes: 120.8 cm/sec  MV E/A: 0.82  MV dec time: 0.27 sec  Ao V2 max: 377.6 cm/sec  Ao max P.0 mmHg  Ao V2 mean: 249.4 cm/sec  Ao mean P.7 mmHg  Ao V2 VTI: 77.3 cm  RENE(I,D): 1.1 cm2  RENE(V,D): 1.1 cm2  LV V1 max P.4 mmHg  LV V1 max: 126.7 cm/sec  LV V1 VTI: 25.9 cm  SV(LVOT): 85.8 ml  SI(LVOT): 45.1 ml/m2  RENE Index (cm2/m2): 0.58  Lateral E/e': 13.1  Medial E/e': 14.2           _____________________________________________________________________________  __           Report approved by: Felipe Duval 2017 10:15 AM       1    Type Source Collected On     17 0851          View Image (below)              Encounter-Level Documents:     There are no encounter-level documents.      Order-Level Documents:     There are no order-level documents.

## 2017-06-06 NOTE — IP AVS SNAPSHOT
[place] [facility] [address] [facility]    [address] [place] [address]    Phone:  [phone]                                       After Visit Summary   6/6/2017    Kavitha Headley    MRN: [id number]           After Visit Summary Signature Page     I have received my discharge instructions, and my questions have been answered. I have discussed any challenges I see with this plan with the nurse or doctor.    ..........................................................................................................................................  Patient/Patient Representative Signature      ..........................................................................................................................................  Patient Representative Print Name and Relationship to Patient    ..................................................               ................................................  Date                                            Time    ..........................................................................................................................................  Reviewed by Signature/Title    ...................................................              ..............................................  Date                                                            Time

## 2017-06-06 NOTE — IP AVS SNAPSHOT
"    [place] [address] [facility]: [phone]                                              INTERAGENCY TRANSFER FORM - LAB / IMAGING / EKG / EMG RESULTS   2017                    Hospital Admission Date: 2017  DARWIN JASSO   : 1945  Sex: Female        Attending Provider: Enedelia Rangel MD     Allergies:  Bactrim [Sulfamethoxazole W/trimethoprim], Codeine, Morphine    Infection:  None   Service:  HOSPITALIST    Ht:  1.626 m (5' 4\")   Wt:  90 kg (198 lb 6.6 oz)   Admission Wt:  81.6 kg (180 lb)    BMI:  34.06 kg/m 2   BSA:  2.02 m 2            Patient PCP Information     Provider PCP Type    Alison Pena MD General         Lab Results - 3 Days      CBC with platelets [[id number]] (Abnormal)  Resulted: 17 1019, Result status: Final result    Ordering provider: Nan Macias DO  17 0000 Resulting lab: [facility]    Specimen Information    Type Source Collected On   Blood  17 0845          Components       Value Reference Range Flag Lab   WBC 6.4 4.0 - 11.0 10e9/L  FrStHsLb   RBC Count 2.55 3.8 - 5.2 10e12/L L FrStHsLb   Hemoglobin 9.2 11.7 - 15.7 g/dL L FrStHsLb   Hematocrit 25.4 35.0 - 47.0 % L FrStHsLb    78 - 100 fl  FrStHsLb   MCH 36.1 26.5 - 33.0 pg H FrStHsLb   MCHC 36.2 31.5 - 36.5 g/dL  FrStHsLb   RDW 15.3 10.0 - 15.0 % H FrStHsLb   Platelet Count 106 150 - 450 10e9/L L FrStHsLb            Comprehensive metabolic panel [[id number]] (Abnormal)  Resulted: 17 0940, Result status: Final result    Ordering provider: Nan Macias DO  17 0000 Resulting lab: [facility]    Specimen Information    Type Source Collected On   Blood  17 0845          Components       Value Reference Range Flag Lab   Sodium 137 133 - 144 mmol/L  FrStHsLb   Potassium 3.5 3.4 - 5.3 mmol/L  FrStHsLb   Chloride 98 94 - 109 mmol/L  FrStHsLb   Carbon Dioxide 32 20 - 32 mmol/L  FrStHsLb   Anion Gap 7 3 - 14 " mmol/L  FrStHsLb   Glucose 89 70 - 99 mg/dL  FrStHsLb   Urea Nitrogen 5 7 - 30 mg/dL L FrStHsLb   Creatinine 0.74 0.52 - 1.04 mg/dL  FrStHsLb   GFR Estimate 76 >60 mL/min/1.7m2  FrStHsLb   Comment:  Non  GFR Calc   GFR Estimate If Black -- >60 mL/min/1.7m2  FrStHsLb   Result:         >90   GFR Calc     Calcium 8.1 8.5 - 10.1 mg/dL L FrStHsLb   Result:     Bilirubin Total 5.8 0.2 - 1.3 mg/dL H FrStHsLb   Albumin 2.4 3.4 - 5.0 g/dL L FrStHsLb   Protein Total 5.2 6.8 - 8.8 g/dL L FrStHsLb   Alkaline Phosphatase 154 40 - 150 U/L H FrStHsLb   ALT 39 0 - 50 U/L  FrStHsLb   AST 89 0 - 45 U/L H FrStHsLb            INR (AM Draw) [[id number]]  Resulted: 06/16/17 0921, Result status: Final result    Ordering provider: Parth Graham MD  06/16/17 0000 Resulting lab: [facility]    Specimen Information    Type Source Collected On   Blood  06/16/17 0845          Components       Value Reference Range Flag Lab   INR 1.03 0.86 - 1.14  FrStHsLb            CBC with platelets differential [[id number]] (Abnormal)  Resulted: 06/15/17 1837, Result status: Final result    Ordering provider: Nahum Pa MD  06/15/17 1516 Resulting lab: [facility]    Specimen Information    Type Source Collected On   Blood  06/15/17 1714          Components       Value Reference Range Flag Lab   WBC 6.7 4.0 - 11.0 10e9/L  FrStHsLb   RBC Count 2.78 3.8 - 5.2 10e12/L L FrStHsLb   Hemoglobin 10.0 11.7 - 15.7 g/dL L FrStHsLb   Hematocrit 27.8 35.0 - 47.0 % L FrStHsLb    78 - 100 fl  FrStHsLb   MCH 36.0 26.5 - 33.0 pg H FrStHsLb   MCHC 36.0 31.5 - 36.5 g/dL  FrStHsLb   RDW 15.4 10.0 - 15.0 % H FrStHsLb   Platelet Count 111 150 - 450 10e9/L L FrStHsLb   Comment:  Giant Platelets present   Diff Method Manual Differential   FrStHsLb   % Neutrophils 61.0 %  FrStHsLb   % Lymphocytes 22.0 %  FrStHsLb   % Monocytes 9.0 %  FrStHsLb   % Eosinophils 6.0 %  FrStHsLb   % Basophils 2.0 %   FrStHsLb   Absolute Neutrophil 4.1 1.6 - 8.3 10e9/L  FrStHsLb   Absolute Lymphocytes 1.5 0.8 - 5.3 10e9/L  FrStHsLb   Absolute Monocytes 0.6 0.0 - 1.3 10e9/L  FrStHsLb   Absolute Eosinophils 0.4 0.0 - 0.7 10e9/L  FrStHsLb   Absolute Basophils 0.1 0.0 - 0.2 10e9/L  FrStHsLb   RBC Morphology Consistent with reported results   FrStHsLb   Platelet Estimate Confirming automated cell count   FrStHsLb            Hepatic panel [[id number]] (Abnormal)  Resulted: 06/15/17 1740, Result status: Final result    Ordering provider: Nahum Pa MD  06/15/17 1517 Resulting lab: [facility]    Specimen Information    Type Source Collected On   Blood  06/15/17 1714          Components       Value Reference Range Flag Lab   Bilirubin Direct 6.2 0.0 - 0.2 mg/dL H FrStHsLb   Bilirubin Total 7.1 0.2 - 1.3 mg/dL H FrStHsLb   Albumin 2.9 3.4 - 5.0 g/dL L FrStHsLb   Protein Total 5.9 6.8 - 8.8 g/dL L FrStHsLb   Alkaline Phosphatase 196 40 - 150 U/L H FrStHsLb   ALT 39 0 - 50 U/L  FrStHsLb   AST 96 0 - 45 U/L H FrStHsLb            Surgical pathology exam [[id number]]  Resulted: 06/15/17 1450, Result status: Edited Result - FINAL    Ordering provider: Abhishek Graham MD  06/12/17 1635 Resulting lab: [facility]    Specimen Information    Type Source Collected On   Tissue Esophagus 06/12/17 1610          Components       Value Reference Range Flag Lab   Copath Report --      Result:         Patient Name: DARWIN JASSO  MR#: [id number]  Specimen #: [id number]  Collected: 6/12/2017  Received: 6/13/2017  Reported: 6/14/2017 13:45  Ordering Phy(s): ABHISHEK GRAHAM    For improved result formatting, select 'View Enhanced Report Format'  under Linked Documents section.    ***ORIGINAL REPORT FOLLOWS ADDENDUM***    ADDENDUM    TO ORIGINAL REPORT  Status: Signed Out  Date Ordered:6/15/2017  Date Complete:6/15/2017  Date Reported:6/15/2017 14:49  Signed Out By: Marvel Liz M.D.    COMMENTS:  A GMS stain for fungal  "organisms is negative.  The diagnosis remains  unchanged.    __________________________________________    ORIGINAL REPORT:    SPECIMEN(S):  Esophageal ulcer biopsy    FINAL DIAGNOSIS:  Esophagus, not otherwise specified, biopsy  - Nonneoplastic esophageal mucosa with ulceration, reactive epithelial  atypia, special stain for fungal organisms pending    Electronically signed out by:    Marvel Liz M.D.    GROSS:  The specimen is received in formalin with  proper patient identification  labeled \"esophageal ulcer biopsy\".  The specimen consists of pink tissue  fragment measuring up to 0.2 cm.  The specimen is entirely submitted in  one cassette. (Dictated by: Galo Angeles 6/13/2017 08:18 AM)    MICROSCOPIC:  Sections show benign acutely inflamed esophageal mucosa exhibiting  reactive atypia.  Acutely inflamed granulation tissue compatible with  ulcer base is also identified.  Viral inclusions are not observed.  There is no evidence of malignancy.  Special stain for fungal organisms  will be performed, the results of which will be issued in a supplemental  report.    CPT Codes:  A: 16001-PZ0, 23357-IGB    TESTING LAB LOCATION:  [address]  [address]  [phone]    COLLECTION SITE:  Client: [facility]  Location: [facility] (S)              Creatinine [[id number]]  Resulted: 06/15/17 0854, Result status: Final result    Ordering provider: Enedelia Rangel MD  06/14/17 1800 Resulting lab: [facility]    Specimen Information    Type Source Collected On   Blood  06/15/17 0822          Components       Value Reference Range Flag Lab   Creatinine 0.87 0.52 - 1.04 mg/dL  FrStHsLb   GFR Estimate 64 >60 mL/min/1.7m2  FrStHsLb   Comment:  Non  GFR Calc   GFR Estimate If Black 78 >60 mL/min/1.7m2  FrStHsLb   Comment:   GFR Calc            Folate [[id number]]  Resulted: 06/14/17 1424, Result status: Final " result    Ordering provider: Nahum Pa MD  06/14/17 0000 Resulting lab: [facility]    Specimen Information    Type Source Collected On   Blood  06/14/17 0812          Components       Value Reference Range Flag Lab   Folate 26.9 >5.4 ng/mL  51            Vitamin B12 [[id number]] (Abnormal)  Resulted: 06/14/17 1408, Result status: Final result    Ordering provider: Nahum Pa MD  06/14/17 0000 Resulting lab: [facility]    Specimen Information    Type Source Collected On   Blood  06/14/17 0812          Components       Value Reference Range Flag Lab   Vitamin B12 1832 193 - 986 pg/mL H 51            EBV Capsid Antibody IgG [[id number]] (Abnormal)  Resulted: 06/14/17 1230, Result status: Final result    Ordering provider: Enedelia Rangel MD  06/13/17 1453 Resulting lab: [facility]    Specimen Information    Type Source Collected On     06/13/17 2115          Components       Value Reference Range Flag Lab   EBV Capsid Antibody IgG -- 0.0 - 0.8 AI H 51   Result:         >8.0  Positive, suggests recent or past exposure   Antibody index (AI) values reflect qualitative changes in antibody   concentration that cannot be directly associated with clinical condition or   disease state.              Bilirubin direct [[id number]] (Abnormal)  Resulted: 06/14/17 1114, Result status: Final result    Ordering provider: Lina Caldera MD  06/14/17 0812 Resulting lab: [facility]    Specimen Information    Type Source Collected On     06/14/17 0812          Components       Value Reference Range Flag Lab   Bilirubin Direct 6.9 0.0 - 0.2 mg/dL H FrStHsLb            Platelet count [[id number]] (Abnormal)  Resulted: 06/14/17 0945, Result status: Final result    Ordering provider: Lina Caldera MD  06/14/17 0000 Resulting lab: [facility]    Specimen Information    Type Source Collected  On   Blood  06/14/17 0812          Components       Value Reference Range Flag Lab   Platelet Count 61 150 - 450 10e9/L L FrStHsLb            Iron and iron binding capacity [[id number]] (Abnormal)  Resulted: 06/14/17 0851, Result status: Final result    Ordering provider: Nahum Pa MD  06/14/17 0000 Resulting lab: [facility]    Specimen Information    Type Source Collected On   Blood  06/14/17 0812          Components       Value Reference Range Flag Lab   Iron 123 35 - 180 ug/dL  FrStHsLb   Iron Binding Cap 126 240 - 430 ug/dL L FrStHsLb   Iron Saturation Index 98 15 - 46 % H FrStHsLb            Lactate Dehydrogenase [[id number]]  Resulted: 06/14/17 0851, Result status: Final result    Ordering provider: Nahum Pa MD  06/14/17 0000 Resulting lab: [facility]    Specimen Information    Type Source Collected On   Blood  06/14/17 0812          Components       Value Reference Range Flag Lab   Lactate Dehydrogenase 202 81 - 234 U/L  FrStHsLb            Ferritin [[id number]]  Resulted: 06/14/17 0844, Result status: Final result    Ordering provider: Nahum Pa MD  06/14/17 0000 Resulting lab: [facility]    Specimen Information    Type Source Collected On   Blood  06/14/17 0812          Components       Value Reference Range Flag Lab   Ferritin 167 8 - 252 ng/mL  FrStHsLb            Comprehensive metabolic panel [[id number]] (Abnormal)  Resulted: 06/14/17 0844, Result status: Final result    Ordering provider: Lina Caldera MD  06/14/17 0000 Resulting lab: [facility]    Specimen Information    Type Source Collected On   Blood  06/14/17 0812          Components       Value Reference Range Flag Lab   Sodium 134 133 - 144 mmol/L  FrStHsLb   Potassium 4.2 3.4 - 5.3 mmol/L  FrStHsLb   Chloride 97 94 - 109 mmol/L  FrStHsLb   Carbon Dioxide 33 20 - 32 mmol/L H FrStHsLb   Anion Gap 4 3 - 14 mmol/L  FrStHsLb   Glucose 74 70 - 99 mg/dL  FrStHsLb    Urea Nitrogen 10 7 - 30 mg/dL  FrStHsLb   Creatinine 0.96 0.52 - 1.04 mg/dL  FrStHsLb   GFR Estimate 57 >60 mL/min/1.7m2 L FrStHsLb   Comment:  Non  GFR Calc   GFR Estimate If Black 69 >60 mL/min/1.7m2  FrStHsLb   Comment:  African American GFR Calc   Calcium 8.2 8.5 - 10.1 mg/dL L FrStHsLb   Bilirubin Total 8.1 0.2 - 1.3 mg/dL H FrStHsLb   Albumin 2.5 3.4 - 5.0 g/dL L FrStHsLb   Protein Total 5.3 6.8 - 8.8 g/dL L FrStHsLb   Alkaline Phosphatase 213 40 - 150 U/L H FrStHsLb   ALT 25 0 - 50 U/L  FrStHsLb   AST 65 0 - 45 U/L H FrStHsLb            CMV antibody IgM [[id number]]  Resulted: 06/14/17 0819, Result status: Final result    Ordering provider: Lina Caldera MD  06/13/17 0000 Resulting lab: [facility]    Specimen Information    Type Source Collected On   Blood  06/13/17 0800          Components       Value Reference Range Flag Lab   CMV Antibody IgM -- 0.0 - 0.8 AI  51   Result:         <0.2  Negative   Antibody index (AI) values reflect qualitative changes in antibody   concentration that cannot be directly associated with clinical condition or   disease state.              EBV Capsid Antibody IgG [[id number]] (Abnormal)  Resulted: 06/14/17 0819, Result status: Final result    Ordering provider: Lina Caldera MD  06/13/17 0800 Resulting lab: [facility]    Specimen Information    Type Source Collected On     06/13/17 0800          Components       Value Reference Range Flag Lab   EBV Capsid Antibody IgG -- 0.0 - 0.8 AI H 51   Result:         >8.0  Positive, suggests recent or past exposure   Antibody index (AI) values reflect qualitative changes in antibody   concentration that cannot be directly associated with clinical condition or   disease state.              Ammonia [[id number]]  Resulted: 06/13/17 1554, Result status: Final result    Ordering provider: Lina Caldera MD  06/13/17 1359 Resulting lab: [place]  [facility]    Specimen Information    Type Source Collected On   Blood  06/13/17 1445          Components       Value Reference Range Flag Lab   Ammonia 45 10 - 50 umol/L  FrStHsLb   Comment:  TESTING DELAYED INTERPRET RESULTS WITH CAUTION 1550 CK            Fractional excretion of sodium [[id number]]  Resulted: 06/13/17 1552, Result status: Final result    Ordering provider: Tesfaye العراقي MD  06/09/17 1225 Resulting lab: [facility]    Specimen Information    Type Source Collected On   Urine Urine Midstream 06/13/17 1350          Components       Value Reference Range Flag Lab   Creatinine Urine 177 mg/dL  FrStHsLb   Sodium Urine mmol/L 6 mmol/L  FrStHsLb   %FENA -- %  FrStHsLb   Result:         <0.1  Adult:    <1 percent             Indicates prerenal azotemia             >3 percent             Suggests acute tubular             necrosis   Neonates: <2.5 percent             Suggest prerenal azotemia             >2.5 percent             Suggest acute tubular necrosis              Bld morphology pathology review [[id number]]  Resulted: 06/13/17 1535, Result status: Final result    Ordering provider: Christie Pa MD  06/13/17 1223 Resulting lab: [facility]    Specimen Information    Type Source Collected On   Blood  06/13/17 0800          Components       Value Reference Range Flag Lab   Copath Report --      Result:         Patient Name: DARWIN JASSO  MR#: [id number]  Specimen #: [id number]  Collected: 6/13/2017  Received: 6/13/2017  Reported: 6/13/2017 15:33  Ordering Phy(s): ARASH GUERRA  Additional Phy(s): CHRISTIE PA    For improved result formatting, select 'View Enhanced Report Format'  under Linked Documents section.    TEST(S):  Peripheral Smear Morphology    FINAL DIAGNOSIS:    Peripheral blood examination  - Macrocytosis of red cells  - Moderate thrombocytopenia  - No blast cells seen    COMMENT:    Correlation with the clinical findings to evaluate the  macrocytosis  including serum B12 and folate levels, liver function tests, the thyroid  hormone level and other aspect of the clinical history.  The cause of  the thrombocytopenia is not apparent based on the peripheral blood smear  review.    Electronically signed out by:    Kin Bridges M.D.    PERIPHERAL BLOOD DATA:  <<<<<    PERIPHERAL BLOOD DATA  Patient Value (Reference Range >18 year old female)  7.15 WBC (4.0-11.0 x 10*9/L)   3.23 RBC (3.8-5.2 x 10*12/L)  11.7 HGB (11.7-15.7 g/dL)  33.1 HCT (35.0-47.0 %)  105.2 MCV (78-100fL)  36.2 MCH (26.5-33.0 pg)  35.3 MCHC (31.5-36.5 g/dL)  15.6 RDW (10.0-15.0 %)  61 PLT (150-450 x 10*9/L)  2.97 Retic (0.5-2.0%)    PERIPHERAL BLOOD DIFFERENTIAL  (Reference ranges >18 year old)    Absolute  4.11 Neutrophils,segmented and bands  (1.6 - 8.3 x 10*9/L)  1.67 Lymphocytes  (0.8 - 5.3 x 10*9/L)  0.98 Monocytes  (0 -1.3 x 10*9/L)  0.29 Eosinophils  (0 - 0.7 x 10*9/L)  0.07 Basophils  (0 - 0.2 x 10*9/L)    PERIPHERAL MORPHOLOGY:    ERYTHROCYTES: Macrocytic normochromic red cells are present in the  peripheral blood smear.  No significant anisocytosis or poikilocytosis  is seen.  Rouleaux formation is absent.    LEUKOCYTES: The granulocytes show normal nuclear lobulation and normal  cytoplasmic granularity without dysplastic changes.  The lymphocytes  show small mature forms.  The monocytes show normal morphology.  No  blast cells are seen.    PLATELETS: Platelets are present i n moderately decreased numbers and  show mildly increased size in which platelet size approaches that of red  blood cells.    >>>>>    CPT Codes:  A: 14186-RDDH    TESTING LAB LOCATION:  [address]  [address] [phone]    COLLECTION SITE:  Client:  [facility]  Location:  [facility] (S)              Sodium [[id number]] (Abnormal)  Resulted: 06/13/17 1518, Result status: Final result    Ordering provider: Parth Graham MD   06/13/17 1402 Resulting lab: [facility]    Specimen Information    Type Source Collected On   Blood  06/13/17 1445          Components       Value Reference Range Flag Lab   Sodium 131 133 - 144 mmol/L L FrStHsLb            Creatinine [[id number]] (Abnormal)  Resulted: 06/13/17 1518, Result status: Final result    Ordering provider: Parth Graham MD  06/13/17 1402 Resulting lab: [facility]    Specimen Information    Type Source Collected On   Blood  06/13/17 1445          Components       Value Reference Range Flag Lab   Creatinine 1.00 0.52 - 1.04 mg/dL  FrStHsLb   GFR Estimate 54 >60 mL/min/1.7m2 L FrStHsLb   Comment:  Non  GFR Calc   GFR Estimate If Black 66 >60 mL/min/1.7m2  FrStHsLb   Comment:   GFR Calc            UA with Microscopic reflex to Culture [[id number]] (Abnormal)  Resulted: 06/13/17 1432, Result status: Final result    Ordering provider: Lina Caldera MD  06/13/17 0846 Resulting lab: [facility]    Specimen Information    Type Source Collected On   Urine Urine Midstream 06/13/17 1350          Components       Value Reference Range Flag Lab   Color Urine Brown   FrStHsLb   Appearance Urine Slightly Cloudy   FrStHsLb   Glucose Urine Negative NEG mg/dL  FrStHsLb   Bilirubin Urine -- NEG A FrStHsLb   Result:         Moderate   This is an unconfirmed screening test result. A positive result may be false.     Ketones Urine Negative NEG mg/dL  FrStHsLb   Result:     Specific Gravity Urine 1.019 1.003 - 1.035  FrStHsLb   Blood Urine Negative NEG  FrStHsLb   pH Urine 6.0 5.0 - 7.0 pH  FrStHsLb   Protein Albumin Urine 30 NEG mg/dL A FrStHsLb   Urobilinogen mg/dL Normal 0.0 - 2.0 mg/dL  FrStHsLb   Nitrite Urine Negative NEG  FrStHsLb   Leukocyte Esterase Urine Small NEG A FrStHsLb   Source Midstream Urine   FrStHsLb   WBC Urine 6 0 - 2 /HPF H FrStHsLb   RBC Urine 2 0 - 2 /HPF  FrStHsLb   Squamous Epithelial /HPF Urine  4 0 - 1 /HPF H FrStHsLb   Mucous Urine Present NEG /LPF A FrStHsLb   Hyaline Casts 3 0 - 2 /LPF H FrStHsLb   Calcium Oxalate Few NEG /HPF A FrStHsLb            Reticulocyte count [[id number]] (Abnormal)  Resulted: 06/13/17 1240, Result status: Final result    Ordering provider: Lina Caldrea MD  06/13/17 0800 Resulting lab: [facility]    Specimen Information    Type Source Collected On     06/13/17 0800          Components       Value Reference Range Flag Lab   % Retic 3.0 0.5 - 2.0 % H FrStHsLb   Absolute Retic 95.6 25 - 95 10e9/L H FrStHsLb            CBC with platelets differential [[id number]] (Abnormal)  Resulted: 06/13/17 0957, Result status: Final result    Ordering provider: Lina Caldera MD  06/13/17 0000 Resulting lab: [facility]    Specimen Information    Type Source Collected On   Blood  06/13/17 0800          Components       Value Reference Range Flag Lab   WBC 7.2 4.0 - 11.0 10e9/L  FrStHsLb   RBC Count 3.23 3.8 - 5.2 10e12/L L FrStHsLb   Hemoglobin 11.7 11.7 - 15.7 g/dL  FrStHsLb   Hematocrit 33.1 35.0 - 47.0 % L FrStHsLb    78 - 100 fl H FrStHsLb   MCH 36.2 26.5 - 33.0 pg H FrStHsLb   MCHC 35.3 31.5 - 36.5 g/dL  FrStHsLb   RDW 15.6 10.0 - 15.0 % H FrStHsLb   Platelet Count 64 150 - 450 10e9/L L FrStHsLb   Diff Method Automated Method   FrStHsLb   % Neutrophils 57.4 %  FrStHsLb   % Lymphocytes 23.4 %  FrStHsLb   % Monocytes 13.7 %  FrStHsLb   % Eosinophils 4.1 %  FrStHsLb   % Basophils 1.0 %  FrStHsLb   % Immature Granulocytes 0.4 %  FrStHsLb   Nucleated RBCs 0 0 /100  FrStHsLb   Absolute Neutrophil 4.1 1.6 - 8.3 10e9/L  FrStHsLb   Absolute Lymphocytes 1.7 0.8 - 5.3 10e9/L  FrStHsLb   Absolute Monocytes 1.0 0.0 - 1.3 10e9/L  FrStHsLb   Absolute Eosinophils 0.3 0.0 - 0.7 10e9/L  FrStHsLb   Absolute Basophils 0.1 0.0 - 0.2 10e9/L  FrStHsLb   Abs Immature Granulocytes 0.0 0 - 0.4 10e9/L  FrStHsLb   Absolute Nucleated RBC 0.0   FrStHsLb   Platelet Estimate  Confirming automated cell count   FrStHsLb            Comprehensive metabolic panel [[id number]] (Abnormal)  Resulted: 06/13/17 0840, Result status: Final result    Ordering provider: Lina Caldera MD  06/13/17 0000 Resulting lab: [facility]    Specimen Information    Type Source Collected On   Blood  06/13/17 0800          Components       Value Reference Range Flag Lab   Sodium 132 133 - 144 mmol/L L FrStHsLb   Potassium 4.7 3.4 - 5.3 mmol/L  FrStHsLb   Chloride 96 94 - 109 mmol/L  FrStHsLb   Carbon Dioxide 32 20 - 32 mmol/L  FrStHsLb   Anion Gap 4 3 - 14 mmol/L  FrStHsLb   Glucose 88 70 - 99 mg/dL  FrStHsLb   Urea Nitrogen 13 7 - 30 mg/dL  FrStHsLb   Creatinine 1.05 0.52 - 1.04 mg/dL H FrStHsLb   GFR Estimate 51 >60 mL/min/1.7m2 L FrStHsLb   Comment:  Non  GFR Calc   GFR Estimate If Black 62 >60 mL/min/1.7m2  FrStHsLb   Comment:  African American GFR Calc   Calcium 8.4 8.5 - 10.1 mg/dL L FrStHsLb   Bilirubin Total 8.8 0.2 - 1.3 mg/dL H FrStHsLb   Albumin 2.4 3.4 - 5.0 g/dL L FrStHsLb   Protein Total 5.7 6.8 - 8.8 g/dL L FrStHsLb   Alkaline Phosphatase 262 40 - 150 U/L H FrStHsLb   ALT 22 0 - 50 U/L  FrStHsLb   AST 71 0 - 45 U/L H FrStHsLb            HSV IgM antibody [[id number]]  Resulted: 06/13/17 0809, Result status: In process    Ordering provider: Lina Caldera MD  06/13/17 0000 Resulting lab: [facility]    Specimen Information    Type Source Collected On   Blood  06/13/17 0800            Testing Performed By     Lab - Abbreviation Name Director Address Valid Date Range    14 - FrStHsLb [facility] Unknown [address] [place] [address] 05/08/15 1057 - Present    45 - [id number] [facility] Unknown Unknown 01/28/02 0000 - Present    51 - Unknown [facility] Unknown [address] [facility] [address] 12/31/14 1010 - Present    88 - Unknown [facility] Unknown Unknown 10/30/02 0000 - Present            Unresulted Labs (24h ago through  "future)    Start       Ordered    06/10/17 0600  Platelet count  (Pharmacological Prophylaxis - enoxaparin (LOVENOX) *Use only if creatinine clearance is greater than 30 mL/min)  EVERY THREE DAYS,   Routine     Comments:  Repeat every 3 days while on VTE prophylaxis.  Notify provider and hold enoxaparin if platelet count falls by 50% of baseline. If no result is listed, this lab has not been done the past 365 days. LATEST LAB RESULT: Platelet Count (10e9/L)       Date                     Value                 06/06/2017               217              ----------    06/06/17 2109    06/09/17 0000  Creatinine  (Pharmacological Prophylaxis - enoxaparin (LOVENOX) *Use only if creatinine clearance is greater than 30 mL/min)  EVERY THREE DAYS,   Routine     Comments:  Repeat every 3 days while on VTE prophylaxis.    06/06/17 2101    Unscheduled  Potassium  (Potassium Replacement - \"Standard\" - For K levels less than 3.4 mmol/L - UU,UR,UA,RH,SH,PH,WY )  CONDITIONAL (SPECIFY),   Routine     Comments:  Obtain Potassium Level for these conditions:  *IF no potassium result within 24 hours before initiation of order set, draw potassium level with next lab collect.    *2 HOURS AFTER last IV potassium replacement dose and 4 hours after an oral replacement dose.  *Next morning after potassium dose.     Repeat Potassium Replacement if necessary.    06/07/17 1030         Imaging Results - 3 Days      CT Abdomen Pelvis w Contrast [[id number]]  Resulted: 06/14/17 1700, Result status: Final result    Ordering provider: Lina Caldera MD  06/14/17 0948 Resulted by: Zohaib Mosley MD    Performed: 06/14/17 1615 - 06/14/17 1616 Resulting lab: RADIOLOGY RESULTS    Narrative:       CT ABDOMEN AND PELVIS WITH CONTRAST 6/14/2017 4:16 PM    HISTORY: 71-year-old patient with obstructive jaundice and  thrombocytopenia. Request made for evaluation of hepatobiliary tree,  pancreas, and splenomegaly.    COMPARISON: April 10, " 2012.    TECHNIQUE: Axial and coronal CT images obtained from the lung bases  through the abdomen and pelvis after the uneventful administration of  Isovue-370 intravenous contrast given for a total of 50 mL. Radiation  dose for this scan was reduced using automated exposure control,  adjustment of the mA and/or kV according to patient size, or iterative  reconstruction technique.    FINDINGS: Bilateral breast implants are again identified, partially  visible. Lung bases are unremarkable. Heart size is normal without  pericardial effusion. Right total hip arthroplasty. Intervertebral  disc space narrowing throughout the lumbar spine. No acute osseous  abnormality.    Diffuse fatty infiltration throughout the liver, not readily  identified on previous exam. Cholecystectomy clips. No evidence of  intrahepatic or extrahepatic biliary dilatation. No pancreatic ductal  dilatation. Postsurgical changes with gastrojejunostomy. Spleen is  normal in size measuring up to 9.4 cm. The adrenal glands and kidneys  appear normal.    Cortical thinning of the left kidney, compared to the right, though no  hydronephrosis. Bladder is partially distended and unremarkable.  Status post hysterectomy. No dilated loops of bowel. Scattered  bilateral inguinal lymph nodes incidentally identified, the largest of  which measures up to 2.6 x 1.4 cm, though appears to have a fatty  hilum. Patient did have variable degree of lymph nodes in similar  location on previous exam, suspect minimally larger on today's exam.      Impression:       IMPRESSION:  1. Diffuse fatty infiltration throughout the liver. No evidence of  intrahepatic or extrahepatic biliary dilatation. No visible pancreatic  abnormality. Spleen is normal in size.  2. Cortical thinning of the left kidney, relative to the right, though  unchanged compared to previous exam.  3. Enlarged inguinal lymph nodes, though some of the larger lymph  nodes have fatty leilani. Lymph nodes may be  minimally increased in size  compared to previous exam. They may be reactive, though nonspecific.    ZEYNEP CASTRO MD      Testing Performed By     Lab - Abbreviation Name Director Address Valid Date Range    104 - Rad Rslts RADIOLOGY RESULTS Unknown Unknown 05 1553 - Present               ECG/EMG Results      Echocardiogram Complete [[id number]]  Resulted: 17 0851, Result status: Edited Result - FINAL    Ordering provider: Enedelia Medrano MD  17 2101 Resulted by: Kishore Bond MD    Performed: 17 0850 - 17 0937 Resulting lab: RADIOLOGY RESULTS    Narrative:       [id number]  [id number]  [id number]  [id number]^MITCHELL^ENEDELIA^RODRIGO           [facility]  Echocardiography Laboratory  [address] [place] [address]        Name: DARWIN JASSO  MRN: [id number]  : 1945  Study Date: 2017 08:51 AM  Age: 71 yrs  Gender: Female  Patient Location: [facility]  Reason For Study: Aortic Valve Disorder  Ordering Physician: ENEDELIA MEDRANO  Referring Physician: Alison Pena  Performed By: Laurel Jordan RDCS     BSA: 1.9 m2  Height: 64 in  Weight: 187 lb  HR: 91  BP: 113/71 mmHg  _____________________________________________________________________________  __        Procedure  Complete Portable Echo Adult.  _____________________________________________________________________________  __        Interpretation Summary     Hyperdynamic left ventricular functionThe visual ejection fraction is  estimated at >70%.The transmitral spectral Doppler flow pattern is suggestive  of impaired LV relaxation.  The right ventricular systolic function is normal.  The left atrium is mild to moderately dilated.  Moderate valvular aortic stenosis.Mean gradient 29 mm hg, RENE 1.1 cm2.  The ascending aorta is mildly dilated.     Compared to echo dated 2015 no singificant change, aortic valve  gradients and AV area appear  similar.  _____________________________________________________________________________  __        Left Ventricle  The left ventricle is normal in size. There is normal left ventricular wall  thickness. The transmitral spectral Doppler flow pattern is suggestive of  impaired LV relaxation. Hyperdynamic left ventricular function. The visual  ejection fraction is estimated at >70%. No regional wall motion abnormalities  noted.     Right Ventricle  The right ventricle is normal size. Thickened right ventricle free wall, mild.  The right ventricular systolic function is normal.     Atria  The left atrium is mild to moderately dilated. Right atrial size is normal.  There is no color Doppler evidence of an atrial shunt.     Mitral Valve  There is mild mitral annular calcification. There is trace mitral  regurgitation.        Tricuspid Valve  Right ventricular systolic pressure could not be approximated due to  inadequate tricuspid regurgitation. There is trace tricuspid regurgitation.     Aortic Valve  The aortic valve is trileaflet. Moderate valvular aortic stenosis. Mean  kuziunor93 mm hg, RENE 1.1 cm2.     Pulmonic Valve  The pulmonic valve is not well visualized. There is no pulmonic valvular  stenosis.     Vessels  The aortic root is normal size. The ascending aorta is Mildly dilated. 4.1 cm.  Inferior vena cava not well visualized for estimation of right atrial  pressure.     Pericardium  There is no pericardial effusion.        Rhythm  sinus.  _____________________________________________________________________________  __  MMode/2D Measurements & Calculations  IVSd: 0.98 cm     LVIDd: 4.0 cm  LVIDs: 2.7 cm  LVPWd: 0.97 cm  FS: 31.2 %  EDV(Teich): 69.3 ml  ESV(Teich): 28.1 ml  LV mass(C)d: 121.7 grams  LV mass(C)dI: 64.0 grams/m2  Ao root diam: 2.9 cm  LA dimension: 4.1 cm  asc Aorta Diam: 4.1 cm  LA/Ao: 1.4  LVOT diam: 2.1 cm  LVOT area: 3.3 cm2  LA Volume (BP): 45.6 ml  LA Volume Index (BP): 24.0 ml/m2            Doppler Measurements & Calculations  MV E max hermes: 99.0 cm/sec  MV A max hermes: 120.8 cm/sec  MV E/A: 0.82  MV dec time: 0.27 sec  Ao V2 max: 377.6 cm/sec  Ao max P.0 mmHg  Ao V2 mean: 249.4 cm/sec  Ao mean P.7 mmHg  Ao V2 VTI: 77.3 cm  RENE(I,D): 1.1 cm2  RENE(V,D): 1.1 cm2  LV V1 max P.4 mmHg  LV V1 max: 126.7 cm/sec  LV V1 VTI: 25.9 cm  SV(LVOT): 85.8 ml  SI(LVOT): 45.1 ml/m2  RENE Index (cm2/m2): 0.58  Lateral E/e': 13.1  Medial E/e': 14.2           _____________________________________________________________________________  __           Report approved by: Felipe Duval 2017 10:15 AM       1    Type Source Collected On     17 0851          View Image (below)              Encounter-Level Documents:     There are no encounter-level documents.      Order-Level Documents:     There are no order-level documents.

## 2017-06-06 NOTE — IP AVS SNAPSHOT
"    [place] [address] [facility]: [phone]                                              INTERAGENCY TRANSFER FORM - PHYSICIAN ORDERS   2017                    Hospital Admission Date: 2017  DARWIN JASSO   : 1945  Sex: Female        Attending Provider: Enedelia Rangel MD     Allergies:  Bactrim [Sulfamethoxazole W/trimethoprim], Codeine, Morphine    Infection:  None   Service:  HOSPITALIST    Ht:  1.626 m (5' 4\")   Wt:  90 kg (198 lb 6.6 oz)   Admission Wt:  81.6 kg (180 lb)    BMI:  34.06 kg/m 2   BSA:  2.02 m 2            Patient PCP Information     Provider PCP Type    Alison Pena MD General      ED Clinical Impression     Diagnosis Description Comment Added By Time Added    Cellulitis of right lower extremity [L03.115] Cellulitis of right lower extremity [L03.115]  Bobby Guajardo MD 2017  5:48 PM    Hypokalemia [E87.6] Hypokalemia [E87.6]  Bobby Guajardo MD 2017  5:48 PM    Elevated LFTs [R79.89] Elevated LFTs [R79.89]  Bobby Guajardo MD 2017  5:48 PM      Hospital Problems as of 2017              Priority Class Noted POA    Cellulitis Medium  2017 Yes      Non-Hospital Problems as of 2017              Priority Class Noted    Hip joint replacement status   2004    Knee joint replacement status   2005    Personal history of urinary calculi   2006    Hyperlipidemia LDL goal <130   2008    Spinal stenosis   2010    Chronic insomnia   Unknown    Alcohol abuse   3/14/2012    CKD (chronic kidney disease) stage 3, GFR 30-59 ml/min   2012    Chronic pain syndrome   Unknown    Bariatric surgery status   Unknown    Macrocytic anemia   Unknown    Anxiety   7/10/2014    Aortic stenosis Medium  2015    Left-sided low back pain without sciatica   2015    ACP (advance care planning)   2015    PAC (premature atrial contraction) Medium  3/1/2016    Gastroesophageal reflux disease, " esophagitis presence not specified Medium  4/17/2016    Controlled substance agreement signed Medium  12/15/2016    Seasonal affective disorder (H) Medium  12/15/2016    HTN, goal below 140/90 Medium  12/15/2016    Bilateral lower leg cellulitis Medium  6/4/2017    Hypokalemia Medium  6/4/2017    Hyponatremia Medium  6/4/2017      Code Status History     Date Active Date Inactive Code Status Order ID Comments User Context    6/16/2017  9:26 AM  Full Code [id number]  Nan Macias DO Outpatient    6/6/2017  9:01 PM 6/16/2017  9:26 AM Full Code [id number]  Enedelia Rangel MD Inpatient    12/2/2003  4:20 PM 12/2/2003  4:20 PM  None  [place], Flor Demographics         Medication Review      START taking        Dose / Directions Comments    folic acid 1 MG tablet   Commonly known as:  FOLVITE   Used for:  Alcohol abuse        Dose:  1 mg   Take 1 tablet (1 mg) by mouth daily   Quantity:  30 tablet   Refills:  0        lactulose 10 GM/15ML solution   Commonly known as:  CHRONULAC   Used for:  Elevated LFTs, Constipation, unspecified constipation type, Alcoholic fatty liver        Dose:  10 g   Take 15 mLs (10 g) by mouth 2 times daily   Refills:  0        magnesium oxide 400 MG tablet   Commonly known as:  MAG-OX   Used for:  Low magnesium levels        Dose:  400 mg   Take 1 tablet (400 mg) by mouth daily   Quantity:  7 tablet   Refills:  0        polyethylene glycol Packet   Commonly known as:  MIRALAX/GLYCOLAX   Used for:  Constipation, unspecified constipation type        Dose:  17 g   Take 17 g by mouth daily as needed (constipation)   Quantity:  7 packet   Refills:  0        * QUEtiapine 25 MG tablet   Commonly known as:  SEROquel   Used for:  Depression, unspecified depression type        Dose:  25 mg   Take 1 tablet (25 mg) by mouth At Bedtime   Quantity:  60 tablet   Refills:  0        * QUEtiapine 25 MG tablet   Commonly known as:  SEROquel   Used for:  Anxiety        Dose:  12.5 mg   Take 0.5  tablets (12.5 mg) by mouth 3 times daily as needed (anxiety)   Quantity:  60 tablet   Refills:  0        ranitidine 150 MG tablet   Commonly known as:  ZANTAC   Used for:  Gastroesophageal reflux disease, esophagitis presence not specified, Esophagitis        Dose:  150 mg   Take 1 tablet (150 mg) by mouth 2 times daily   Quantity:  60 tablet   Refills:  0        senna-docusate 8.6-50 MG per tablet   Commonly known as:  SENOKOT-S;PERICOLACE   Used for:  Constipation, unspecified constipation type        Dose:  1-2 tablet   Take 1-2 tablets by mouth 2 times daily as needed for constipation   Quantity:  100 tablet   Refills:  0        sucralfate 1 GM/10ML suspension   Commonly known as:  CARAFATE   Used for:  Esophagitis        Dose:  1 g   Take 10 mLs (1 g) by mouth 4 times daily (before meals and nightly)   Quantity:  1200 mL   Refills:  0        thiamine 100 MG tablet   Used for:  Alcohol abuse        Dose:  100 mg   Take 1 tablet (100 mg) by mouth daily   Refills:  0        vortioxetine 5 MG tablet   Commonly known as:  TRINTELLIX/BRINTELLIX   Used for:  Depression, unspecified depression type        Dose:  5 mg   Take 1 tablet (5 mg) by mouth daily   Refills:  0        * Notice:  This list has 2 medication(s) that are the same as other medications prescribed for you. Read the directions carefully, and ask your doctor or other care provider to review them with you.      CONTINUE these medications which may have CHANGED, or have new prescriptions. If we are uncertain of the size of tablets/capsules you have at home, strength may be listed as something that might have changed.        Dose / Directions Comments    * GABAPENTIN PO   This may have changed:  Another medication with the same name was changed. Make sure you understand how and when to take each.        Dose:  600 mg   Take 600 mg by mouth At Bedtime   Refills:  0        * gabapentin 300 MG capsule   Commonly known as:  NEURONTIN   This may have changed:     - medication strength  - when to take this  - reasons to take this  - additional instructions   Used for:  Chronic pain syndrome        Dose:  300 mg   Take 1 capsule (300 mg) by mouth daily   Quantity:  90 capsule   Refills:  0        metoprolol 25 MG 24 hr tablet   Commonly known as:  TOPROL XL   This may have changed:  how much to take   Used for:  PVC's (premature ventricular contractions)        Dose:  12.5 mg   Take 0.5 tablets (12.5 mg) by mouth daily   Quantity:  90 tablet   Refills:  3        mirtazapine 15 MG tablet   Commonly known as:  REMERON   This may have changed:    - medication strength  - how much to take   Used for:  Depression, unspecified depression type        Dose:  15 mg   Take 1 tablet (15 mg) by mouth At Bedtime   Quantity:  30 tablet   Refills:  0        traMADol 50 MG tablet   Commonly known as:  ULTRAM   This may have changed:    - how much to take  - how to take this  - when to take this  - reasons to take this   Used for:  Chronic pain syndrome, Controlled substance agreement signed        Dose:  25 mg   Take 0.5 tablets (25 mg) by mouth 2 times daily as needed for moderate pain TAKE 1 TABLET BY MOUTH TWICE DAILY AS NEEDED FOR MODERATE PAIN   Quantity:  60 tablet   Refills:  0        * Notice:  This list has 2 medication(s) that are the same as other medications prescribed for you. Read the directions carefully, and ask your doctor or other care provider to review them with you.      CONTINUE these medications which have NOT CHANGED        Dose / Directions Comments    CENTRUM SILVER per tablet        Dose:  1 tablet   Take 1 tablet by mouth daily   Refills:  0        metoclopramide 5 MG tablet   Commonly known as:  REGLAN   Used for:  Nausea        Dose:  5 mg   Take 1 tablet (5 mg) by mouth 4 times daily as needed   Quantity:  40 tablet   Refills:  0        valACYclovir 1000 mg tablet   Commonly known as:  VALTREX   Used for:  Herpes simplex virus infection        TAKE 2 TABS  EVERY 12 HRS FOR 1 DAY ONLY FOR OUTBREAKS OF COLD SORES as needed   Quantity:  4 tablet   Refills:  3          STOP taking     aspirin 81 MG tablet           atorvastatin 40 MG tablet   Commonly known as:  LIPITOR           cephALEXin 500 MG capsule   Commonly known as:  KEFLEX           furosemide 20 MG tablet   Commonly known as:  LASIX           minocycline 100 MG capsule   Commonly known as:  MINOCIN/DYNACIN           naproxen 500 MG tablet   Commonly known as:  NAPROSYN           Potassium Chloride ER 20 MEQ Tbcr           temazepam 15 MG capsule   Commonly known as:  RESTORIL           traZODone 100 MG tablet   Commonly known as:  DESYREL                   Summary of Visit     Reason for your hospital stay       Evaluation of your lower extremity swelling and abnormal liver tests. It was determined that you likely have liver damage which is related to your alcohol use. Your medications were adjusted.             After Care     Activity - Up with nursing assistance           Advance Diet as Tolerated       Follow this diet upon discharge: 2g salt       Daily weights       Call Provider for weight gain of more than 2 pounds per day or 5 pounds per week.       General info for SNF       Length of Stay Estimate: Short Term Care: Estimated # of Days <30  Condition at Discharge: Improving  Level of care:skilled   Rehabilitation Potential: Good  Admission H&P remains valid and up-to-date: Yes  Recent Chemotherapy: N/A  Use Nursing Home Standing Orders: N/A       Mantoux instructions       Give two-step Mantoux (PPD) Per Facility Policy Yes             Referrals     Occupational Therapy Adult Consult       Evaluate and treat as clinically indicated.    Reason:  deconditioning       Physical Therapy Adult Consult       Evaluate and treat as clinically indicated.    Reason:  deconditioning             Your next 10 appointments already scheduled     Jun 16, 2017  4:15 PM CDT   New Visit with Nayeli Bartholomew MD    [facility] [place] [facility])    [address]  [facility] [address] [phone]              Follow-Up Appointment Instructions     Future Labs/Procedures    Follow Up and recommended labs and tests     Comments:    1. Follow up with PCP or facility physician in 5-7 days with repeat CMP and CBC.  2. Follow up with Dr. Graham (gastroenterology) in clinic in 2-4 weeks.  3. Follow up with Dr. Pa (hematology/oncology) in 2-4 weeks.      Follow-Up Appointment Instructions     Follow Up and recommended labs and tests       1. Follow up with PCP or facility physician in 5-7 days with repeat CMP and CBC.  2. Follow up with Dr. Graham (gastroenterology) in clinic in 2-4 weeks.  3. Follow up with Dr. Pa (hematology/oncology) in 2-4 weeks.             Statement of Approval     Ordered          06/16/17 0927  I have reviewed and agree with all the recommendations and orders detailed in this document.  EFFECTIVE NOW     Approved and electronically signed by:  Nan Macias DO

## 2017-06-07 ENCOUNTER — APPOINTMENT (OUTPATIENT)
Dept: CARDIOLOGY | Facility: CLINIC | Age: 72
DRG: 432 | End: 2017-06-07
Attending: INTERNAL MEDICINE
Payer: MEDICARE

## 2017-06-07 ENCOUNTER — APPOINTMENT (OUTPATIENT)
Dept: ULTRASOUND IMAGING | Facility: CLINIC | Age: 72
DRG: 432 | End: 2017-06-07
Attending: INTERNAL MEDICINE
Payer: MEDICARE

## 2017-06-07 LAB
ALBUMIN SERPL-MCNC: 2.2 G/DL (ref 3.4–5)
ALBUMIN UR-MCNC: NEGATIVE MG/DL
ALP SERPL-CCNC: 173 U/L (ref 40–150)
ALT SERPL W P-5'-P-CCNC: 115 U/L (ref 0–50)
ANION GAP SERPL CALCULATED.3IONS-SCNC: 9 MMOL/L (ref 3–14)
APPEARANCE UR: CLEAR
AST SERPL W P-5'-P-CCNC: 212 U/L (ref 0–45)
BILIRUB DIRECT SERPL-MCNC: 1.9 MG/DL (ref 0–0.2)
BILIRUB SERPL-MCNC: 2.4 MG/DL (ref 0.2–1.3)
BILIRUB UR QL STRIP: NEGATIVE
BUN SERPL-MCNC: 18 MG/DL (ref 7–30)
CALCIUM SERPL-MCNC: 7.9 MG/DL (ref 8.5–10.1)
CHLORIDE SERPL-SCNC: 85 MMOL/L (ref 94–109)
CO2 SERPL-SCNC: 36 MMOL/L (ref 20–32)
COLOR UR AUTO: YELLOW
CREAT SERPL-MCNC: 0.88 MG/DL (ref 0.52–1.04)
ERYTHROCYTE [DISTWIDTH] IN BLOOD BY AUTOMATED COUNT: 13.9 % (ref 10–15)
GFR SERPL CREATININE-BSD FRML MDRD: 63 ML/MIN/1.7M2
GLUCOSE SERPL-MCNC: 66 MG/DL (ref 70–99)
GLUCOSE UR STRIP-MCNC: NEGATIVE MG/DL
HCT VFR BLD AUTO: 30.2 % (ref 35–47)
HGB BLD-MCNC: 11.3 G/DL (ref 11.7–15.7)
HGB UR QL STRIP: NEGATIVE
INR PPP: 1.02 (ref 0.86–1.14)
KETONES UR STRIP-MCNC: NEGATIVE MG/DL
LEUKOCYTE ESTERASE UR QL STRIP: ABNORMAL
MAGNESIUM SERPL-MCNC: 1.5 MG/DL (ref 1.6–2.3)
MAGNESIUM SERPL-MCNC: 2.5 MG/DL (ref 1.6–2.3)
MAGNESIUM SERPL-MCNC: NORMAL MG/DL (ref 1.6–2.3)
MCH RBC QN AUTO: 36.9 PG (ref 26.5–33)
MCHC RBC AUTO-ENTMCNC: 37.4 G/DL (ref 31.5–36.5)
MCV RBC AUTO: 99 FL (ref 78–100)
NITRATE UR QL: NEGATIVE
OSMOLALITY UR: 165 MMOL/KG (ref 100–1200)
PH UR STRIP: 6 PH (ref 5–7)
PLATELET # BLD AUTO: 166 10E9/L (ref 150–450)
POTASSIUM SERPL-SCNC: 2.8 MMOL/L (ref 3.4–5.3)
POTASSIUM SERPL-SCNC: 3.7 MMOL/L (ref 3.4–5.3)
POTASSIUM SERPL-SCNC: NORMAL MMOL/L (ref 3.4–5.3)
PROCALCITONIN SERPL-MCNC: 1.99 NG/ML
PROT SERPL-MCNC: 5 G/DL (ref 6.8–8.8)
RBC # BLD AUTO: 3.06 10E12/L (ref 3.8–5.2)
RBC #/AREA URNS AUTO: 1 /HPF (ref 0–2)
SODIUM SERPL-SCNC: 130 MMOL/L (ref 133–144)
SODIUM UR-SCNC: 5 MMOL/L
SP GR UR STRIP: 1 (ref 1–1.03)
SQUAMOUS #/AREA URNS AUTO: 2 /HPF (ref 0–1)
TRANS CELLS #/AREA URNS HPF: 1 /HPF (ref 0–1)
URN SPEC COLLECT METH UR: ABNORMAL
UROBILINOGEN UR STRIP-MCNC: NORMAL MG/DL (ref 0–2)
WBC # BLD AUTO: 6.2 10E9/L (ref 4–11)
WBC #/AREA URNS AUTO: 2 /HPF (ref 0–2)

## 2017-06-07 PROCEDURE — 25000128 H RX IP 250 OP 636: Performed by: INTERNAL MEDICINE

## 2017-06-07 PROCEDURE — 36415 COLL VENOUS BLD VENIPUNCTURE: CPT | Performed by: INTERNAL MEDICINE

## 2017-06-07 PROCEDURE — A9270 NON-COVERED ITEM OR SERVICE: HCPCS | Mod: GY | Performed by: INTERNAL MEDICINE

## 2017-06-07 PROCEDURE — 83735 ASSAY OF MAGNESIUM: CPT | Performed by: INTERNAL MEDICINE

## 2017-06-07 PROCEDURE — 80076 HEPATIC FUNCTION PANEL: CPT | Performed by: INTERNAL MEDICINE

## 2017-06-07 PROCEDURE — 25000132 ZZH RX MED GY IP 250 OP 250 PS 637: Mod: GY | Performed by: INTERNAL MEDICINE

## 2017-06-07 PROCEDURE — 93306 TTE W/DOPPLER COMPLETE: CPT

## 2017-06-07 PROCEDURE — 80048 BASIC METABOLIC PNL TOTAL CA: CPT | Performed by: INTERNAL MEDICINE

## 2017-06-07 PROCEDURE — 93306 TTE W/DOPPLER COMPLETE: CPT | Mod: 26 | Performed by: INTERNAL MEDICINE

## 2017-06-07 PROCEDURE — 85027 COMPLETE CBC AUTOMATED: CPT | Performed by: INTERNAL MEDICINE

## 2017-06-07 PROCEDURE — 99233 SBSQ HOSP IP/OBS HIGH 50: CPT | Performed by: INTERNAL MEDICINE

## 2017-06-07 PROCEDURE — 76705 ECHO EXAM OF ABDOMEN: CPT

## 2017-06-07 PROCEDURE — 84132 ASSAY OF SERUM POTASSIUM: CPT | Performed by: INTERNAL MEDICINE

## 2017-06-07 PROCEDURE — 85610 PROTHROMBIN TIME: CPT | Performed by: INTERNAL MEDICINE

## 2017-06-07 PROCEDURE — 12000000 ZZH R&B MED SURG/OB

## 2017-06-07 PROCEDURE — 84145 PROCALCITONIN (PCT): CPT | Performed by: INTERNAL MEDICINE

## 2017-06-07 RX ORDER — POTASSIUM CL/LIDO/0.9 % NACL 10MEQ/0.1L
10 INTRAVENOUS SOLUTION, PIGGYBACK (ML) INTRAVENOUS
Status: DISCONTINUED | OUTPATIENT
Start: 2017-06-07 | End: 2017-06-16 | Stop reason: HOSPADM

## 2017-06-07 RX ORDER — POTASSIUM CHLORIDE 1.5 G/1.58G
20-40 POWDER, FOR SOLUTION ORAL
Status: DISCONTINUED | OUTPATIENT
Start: 2017-06-07 | End: 2017-06-16 | Stop reason: HOSPADM

## 2017-06-07 RX ORDER — CEFAZOLIN SODIUM 1 G/3ML
1 INJECTION, POWDER, FOR SOLUTION INTRAMUSCULAR; INTRAVENOUS EVERY 8 HOURS
Status: DISCONTINUED | OUTPATIENT
Start: 2017-06-07 | End: 2017-06-09

## 2017-06-07 RX ORDER — ACETAMINOPHEN 325 MG/1
650 TABLET ORAL EVERY 8 HOURS PRN
Status: DISCONTINUED | OUTPATIENT
Start: 2017-06-07 | End: 2017-06-11

## 2017-06-07 RX ORDER — POTASSIUM CHLORIDE 7.45 MG/ML
10 INJECTION INTRAVENOUS
Status: DISCONTINUED | OUTPATIENT
Start: 2017-06-07 | End: 2017-06-16 | Stop reason: HOSPADM

## 2017-06-07 RX ORDER — POTASSIUM CHLORIDE 29.8 MG/ML
20 INJECTION INTRAVENOUS
Status: DISCONTINUED | OUTPATIENT
Start: 2017-06-07 | End: 2017-06-16 | Stop reason: HOSPADM

## 2017-06-07 RX ORDER — POTASSIUM CHLORIDE 1500 MG/1
20-40 TABLET, EXTENDED RELEASE ORAL
Status: DISCONTINUED | OUTPATIENT
Start: 2017-06-07 | End: 2017-06-16 | Stop reason: HOSPADM

## 2017-06-07 RX ORDER — MAGNESIUM SULFATE HEPTAHYDRATE 40 MG/ML
4 INJECTION, SOLUTION INTRAVENOUS EVERY 4 HOURS PRN
Status: DISCONTINUED | OUTPATIENT
Start: 2017-06-07 | End: 2017-06-16 | Stop reason: HOSPADM

## 2017-06-07 RX ADMIN — VANCOMYCIN HYDROCHLORIDE 1250 MG: 5 INJECTION, POWDER, LYOPHILIZED, FOR SOLUTION INTRAVENOUS at 05:57

## 2017-06-07 RX ADMIN — POTASSIUM CHLORIDE 40 MEQ: 1500 TABLET, EXTENDED RELEASE ORAL at 10:46

## 2017-06-07 RX ADMIN — TRAZODONE HYDROCHLORIDE 100 MG: 100 TABLET ORAL at 23:13

## 2017-06-07 RX ADMIN — MIRTAZAPINE 45 MG: 15 TABLET, FILM COATED ORAL at 23:13

## 2017-06-07 RX ADMIN — MAGNESIUM SULFATE IN WATER 4 G: 40 INJECTION, SOLUTION INTRAVENOUS at 11:31

## 2017-06-07 RX ADMIN — POTASSIUM CHLORIDE 40 MEQ: 1500 TABLET, EXTENDED RELEASE ORAL at 12:55

## 2017-06-07 RX ADMIN — THERA TABS 1 TABLET: TAB at 08:40

## 2017-06-07 RX ADMIN — ENOXAPARIN SODIUM 40 MG: 40 INJECTION SUBCUTANEOUS at 23:13

## 2017-06-07 RX ADMIN — ENOXAPARIN SODIUM 40 MG: 40 INJECTION SUBCUTANEOUS at 20:21

## 2017-06-07 RX ADMIN — CEFAZOLIN SODIUM 1 G: 1 INJECTION, POWDER, FOR SOLUTION INTRAMUSCULAR; INTRAVENOUS at 15:12

## 2017-06-07 RX ADMIN — METOPROLOL SUCCINATE 25 MG: 25 TABLET, EXTENDED RELEASE ORAL at 08:40

## 2017-06-07 RX ADMIN — FOLIC ACID 1 MG: 1 TABLET ORAL at 08:40

## 2017-06-07 RX ADMIN — GABAPENTIN 300 MG: 300 CAPSULE ORAL at 08:40

## 2017-06-07 RX ADMIN — CEFAZOLIN SODIUM 1 G: 1 INJECTION, POWDER, FOR SOLUTION INTRAMUSCULAR; INTRAVENOUS at 23:13

## 2017-06-07 RX ADMIN — GABAPENTIN 600 MG: 600 TABLET, FILM COATED ORAL at 23:13

## 2017-06-07 RX ADMIN — CEFAZOLIN SODIUM 1 G: 1 INJECTION, POWDER, FOR SOLUTION INTRAMUSCULAR; INTRAVENOUS at 08:41

## 2017-06-07 RX ADMIN — TEMAZEPAM 7.5 MG: 7.5 CAPSULE ORAL at 23:13

## 2017-06-07 RX ADMIN — ASPIRIN 81 MG: 81 TABLET, COATED ORAL at 08:40

## 2017-06-07 RX ADMIN — THIAMINE HCL (VITAMIN B1) 50 MG TABLET 50 MG: at 08:40

## 2017-06-07 NOTE — PHARMACY-VANCOMYCIN DOSING SERVICE
Pharmacy Vancomycin Initial Note  Date of Service 2017  Patient's  1945  71 year old, female    Indication: Skin and Soft Tissue Infection    Current estimated CrCl = Estimated Creatinine Clearance: 51.9 mL/min (based on Cr of 1.02).    Creatinine for last 3 days  2017:  2:57 PM Creatinine 1.02 mg/dL    Recent Vancomycin Level(s) for last 3 days  No results found for requested labs within last 72 hours.      Vancomycin IV Administrations (past 72 hours)                   vancomycin (VANCOCIN) 1000 mg in dextrose 5% 200 mL PREMIX (mg) 1,000 mg New Bag 17 1820                Nephrotoxins and other renal medications (Future)    Start     Dose/Rate Route Frequency Ordered Stop    17 0630  vancomycin 1250 mg in 0.9% NaCl 250 mL PREMIX      1,250 mg Intravenous EVERY 12 HOURS 17  furosemide (LASIX) injection 40 mg      40 mg Intravenous ONCE 17  vancomycin (VANCOCIN) 500 mg in NaCl 0.9 % 100 mL intermittent infusion      500 mg Intravenous ONCE 17  ibuprofen (ADVIL/MOTRIN) tablet 600 mg      600 mg Oral EVERY 6 HOURS PRN 17            Contrast Orders - past 72 hours     None                Plan:  1.  Start vancomycin  1500mg x 1 then 1250 mg IV q12h.   2.  Goal Trough Level: 10-15 mg/L  (unless pt ends up having osteo or MRSA then would do 15-20)  3.  Pharmacy will check trough levels as appropriate in 1-3 Days.    4. Serum creatinine levels will be ordered every other day for at least 10 days while on concomitant nephrotoxins.    5. [place] method utilized to dose vancomycin therapy: Method 1/2 shona Holder, LedaD

## 2017-06-07 NOTE — PROGRESS NOTES
[facility]    Internal Medicine Hospitalist Progress Note  06/07/2017  I evaluated patient on the above date.    Nik Dejesus Jr., MD  [phone] (p)  Text Page (7 am to 6 pm)      Assessment & Plan Ms. Kavitha Hartman is a pleasant 71-year-old female with past medical history including HTN, DLD, mod AS, dep/anx, EtOH abuse, chronic back pain, recent LE cellulitis ond oral antibiotics, who presented 6/6 with worsening LE erythema and swelling.    BLE cellulitis vs worsening chronic venous stasis.  * Recently treated for cellulitis with abx including clindamycin, then switched to minocycline and Keflex (due to intolerance).  * On initial eval 6/6, pt afebrile, nl WBC. BLE US 6/6 negative. Started on IV vanco 6/6. Given a dose of IV Lasix 6/6.  * Improved 6/7. Procal 1.99 6/7. Seen by ID and abx changed to IV cefazolin 6/7.  * Micro: BC's 6/6 NGTD.  - Continue cefazolin (started 6/7).  - Keep BLE's elevated.  - Monitor cultures.  - Apprec help from Dr. Arreguin.  - Mgmt of LE edema as below.    Chronic LE edema.  * PTA on PRN Lasix 20 mg daily.   * No signs of pulm edema or other signs of CHF.   * Given IV Lasix 6/6 as above.   * Leg swelling improved 6/7. Echo 6/7 showed LVEF >70%, suggestion of impaired LV relaxation, mod AS, ascending aorta is mildly dilated.  - Continue leg elevation.  - Given another dose of Lasix 40 mg IV.    Hyponatremia, possibly nutrional or related to volume overload.  * Na 129 on admission 6/6. Given Lasix 6/6.   * Hyponatremia workup labs not suggestive of SIADH, possibly due to CHF/volume overload.   Recent Labs  Lab 06/07/17  0720 06/06/17  1457   * 129*   - Monitor BMP.    Hypokalemia, could be due nutritional or related to PRN Lasix at home.  - Continue K replacement protocol.    Abnormal liver function tests, suspect from EtOH.  * On admission with with elevated , , total bilirubin 2.3. Statin held on admission.  * Abd US 6/7 showed no acute findings,  "+fattly liver infiltration.  * Overall, suspect related to her daily alcohol drinking.   * Some improvement in LFT's 6/7.  - Decrease in or abstinence from EtOH recommended.  - Monitor LFT's.  - Hold statin for now.    EtOH abuse.  * She reported drinking 2-3 cocktails per night. She denied having history of alcohol withdrawal in the past.    * Her blood work here suggests chronic alcoholic use with hyponatremia, hypokalemia, and elevated liver function tests; also fatty liver on US. INR normal.  - Decrease in or abstinence from EtOH recommended.  - Monitor for signs of w/d.    Moderate aortic stenosis.  * Her last echocardiogram was in 08/2015.  Follows with [facility].  * Echo 6/7 showed LVEF >70%, suggestion of impaired LV relaxation, mod AS, ascending aorta is mildly dilated.  - Monitor i/o's, daily wts.  - F/u Cardiologist for further monitoring.    Hypertension (benign essential).  [PTA: metoprolol XL 25 mg daily.]  - Continue metoprolol XL.    Dyslipidemia.  - Hold atorvastatin as above.    Depression/anxiety.   - Continue Remeron and trazodone.     Insomnia.  - Continue PRN temazepam.    Chronic back pain.  * On narcotic agreement.  - Continue gabapentin, PTA PRN Ultram 50 mg BID.    Prophylaxis.  - PCD's.  - Lovenox.    CODE STATUS: FULL    Dispo.  - Pending above.  - 1-2 more days.    Interval History   Doing better. Legs less swollen and red. No SOB, tolerating diet.    -Data reviewed today: I reviewed all new labs and imaging over the last 24 hours. I personally reviewed no images or EKG's today.    Physical Exam   Heart Rate: 96, Blood pressure 113/71, pulse 80, temperature 97.5  F (36.4  C), temperature source Oral, resp. rate 16, height 1.626 m (5' 4\"), weight 85 kg (187 lb 6.3 oz), SpO2 96 %, not currently breastfeeding.  Vitals:    06/06/17 1418 06/06/17 2026 06/07/17 0703   Weight: 81.6 kg (180 lb) 85.2 kg (187 lb 13.3 oz) 85 kg (187 lb 6.3 oz)     Vital Signs with Ranges  Temp:  [97.5  F (36.4 " " C)-99.1  F (37.3  C)] 97.5  F (36.4  C)  Pulse:  [80-96] 80  Heart Rate:  [96] 96  Resp:  [16-18] 16  BP: (106-138)/(52-81) 113/71  SpO2:  [94 %-99 %] 96 %  Patient Vitals for the past 24 hrs:   BP Temp Temp src Pulse Heart Rate Resp SpO2 Height Weight   06/07/17 0703 113/71 97.5  F (36.4  C) Oral 80 - 16 96 % - 85 kg (187 lb 6.3 oz)   06/06/17 2026 - - - - - - - - 85.2 kg (187 lb 13.3 oz)   06/06/17 1800 106/54 - - - - - 97 % - -   06/06/17 1745 - - - - - - 96 % - -   06/06/17 1730 106/63 - - - - - 96 % - -   06/06/17 1715 - - - - - - 96 % - -   06/06/17 1700 - - - - - - 94 % - -   06/06/17 1645 - - - - - - 97 % - -   06/06/17 1630 109/52 - - - - - 99 % - -   06/06/17 1418 138/81 99.1  F (37.3  C) Oral 96 96 18 98 % 1.626 m (5' 4\") 81.6 kg (180 lb)     I/O's Last 24 hours  I/O last 3 completed shifts:  In: -   Out: 700 [Urine:700]    Constitutional: Slightly jaundiced. Alert, oriented, pleasant.  Respiratory: Clear, no crackles/wheezes.  Cardiovascular: RRR, +I-II/VI sys m, no g/r.  GI: Soft, nt, +BS.  Skin/Integumen: BLE discoloration, slight erythema with retreating from marker outline; BLE trace to 1+ edema, with chronic venous stasis changes.  Other:        Data     Recent Labs  Lab 06/07/17  0720 06/06/17  2155 06/06/17  1457   WBC 6.2  --  6.3   HGB 11.3*  --  12.9   MCV 99  --  102*     --  217   INR 1.02  --   --    *  --  129*   POTASSIUM 2.8* 3.1* 2.5*   CHLORIDE 85*  --  80*   CO2 36*  --  31   BUN 18  --  20   CR 0.88 0.89 1.02   ANIONGAP 9  --  18*   BIRGIT 7.9*  --  8.8   GLC 66*  --  99   ALBUMIN 2.2*  --  2.6*   PROTTOTAL 5.0*  --  6.0*   BILITOTAL 2.4*  --  2.3*   ALKPHOS 173*  --  199*   *  --  160*   *  --  338*     Recent Labs   Lab Test  06/07/17   0720  06/06/17   1457  05/26/17   1619  08/17/16   1235  05/11/16   1447   04/11/12   1214  04/11/12   0649   GLC  66*  99  101*  111*  82   < >   --    --    BGM   --    --    --    --    --    --   134*  112*    < > = " values in this interval not displayed.         Recent Results (from the past 24 hour(s))   US Lower Extremity Venous Duplex Bilateral    Narrative    ULTRASOUND LOWER EXTREMITY VENOUS DUPLEX BILATERAL   6/6/2017 3:49 PM     HISTORY: Swelling and redness.    COMPARISON: Ultrasound legs 2/1/2016.    FINDINGS: Gray-scale, color and Doppler spectral analysis ultrasound  was performed of the bilateral legs. Compression and augmentation  imaging was performed.    There is no evidence for deep venous thrombosis. Subcutaneous edema  identified bilaterally.      Impression    IMPRESSION: No evidence for DVT within either leg.    ORESTES BARRERA MD   US Abdomen Limited    Narrative    ULTRASOUND ABDOMEN LIMITED  6/7/2017 9:00 AM     HISTORY: Rule out liver pathology.    COMPARISON: 4/10/2012.    FINDINGS: The gallbladder is absent. There is abnormal increased  echogenicity of the liver with impaired acoustic transmission. Liver  size is normal. No focal liver lesion or intrahepatic biliary ductal  dilatation. Common bile duct measures 5 mm. Pancreas is completely  obscured by intestinal gas. The right kidney measures 9.7 cm in length  and appears normal.      Impression    IMPRESSION: Fatty infiltrated liver.       Medications   All medications were reviewed.       ceFAZolin  1 g Intravenous Q8H     aspirin EC  81 mg Oral Daily     gabapentin (NEURONTIN) tablet 600 mg  600 mg Oral At Bedtime     gabapentin (NEURONTIN) capsule 300 mg  300 mg Oral Daily     metoprolol  25 mg Oral Daily     mirtazapine  45 mg Oral At Bedtime     multivitamin, therapeutic  1 tablet Oral Daily     traZODone  100 mg Oral At Bedtime     sodium chloride (PF)  3 mL Intracatheter Q8H     enoxaparin  40 mg Subcutaneous Q24H     folic acid  1 mg Oral Daily     vitamin  B-1  50 mg Oral Daily

## 2017-06-07 NOTE — PLAN OF CARE
Problem: Goal Outcome Summary  Goal: Goal Outcome Summary  Outcome: Improving  Pt i s a new admid from Ed. Pt is A&O x4. No complaints of pain. VSS on room air. Pt is up with SBA. Pt will be NPO at midnight due to ultrasound.  Continue to monitor.

## 2017-06-07 NOTE — CONSULTS
INFECTIOUS DISEASE CONSULTATION        DATE OF SERVICE:  06/07/2017       REQUESTING PHYSICIAN:   Dr. Rangel      REASON FOR CONSULTATION:  I was asked by Dr. Rangel to assess bilateral lower leg cellulitis.      IMPRESSION:   1.  Kavitha Headley is a 71-year-old female with history of morbid obesity.  Status post gastric bypass surgery.   2.  History of alcohol abuse.   3.  Chronic pain syndrome.   4.  Admitted with a diagnosis of bilateral lower leg cellulitis with failed outpatient antibiotic therapy.  I suspect that this more likely represents venous stasis disease of the legs rather than cellulitis given the bilateral nature of the redness, as well as absence of fever and leukocytosis.  5.  Sulfa allergy.      RECOMMENDATIONS:   1.  Switch to IV Ancef for the present.   2.  I asked the patient to stress leg elevation.   3.  Will follow clinical progress.      HISTORY OF PRESENT ILLNESS:  This is a 71-year-old female with multiple medical problems including those listed above.  She has previous history of morbid obesity, for which she has had a gastric bypass.  She has had issues with leg swelling in the past and reports previous history of some leg cellulitis.  She takes p.r.n. Lasix for leg swelling.  Over the past 12 days she has noticed bilateral lower extremity swelling and redness.  She saw her primary care physician for this and was started on oral clindamycin which caused nausea.  She was then switched to minocycline and then Keflex was added.  Despite all of these antibiotics she continued to have lower extremity swelling and redness and thus she was admitted to the hospital.  She has not had any fever or chills.  Admission white blood count was normal.  She has been started on IV vancomycin.  Admission blood cultures are negative to date.      PAST MEDICAL HISTORY:   1.  Hypertension.   2.  Hyperlipidemia.   3.  Morbid obesity.  Status post gastric bypass.   4.  History of alcohol abuse.   5.  Chronic  pain syndrome -- on pain agreement.   6.  Macrocytic anemia.   7.  Aortic stenosis.   8.  Chronic kidney disease.   9.  History of supraventricular tachycardia.   10.  Depression/anxiety.      ALLERGIES:  Bactrim -- rash.      SOCIAL HISTORY:  Lives at home.  Nonsmoker.  Two to 3 cocktails per night.      FAMILY HISTORY:  Father had COPD and lung cancer.  Mother  of a blood disorder.      REVIEW OF SYSTEMS:  Negative other than that described above.      PHYSICAL EXAMINATION:   VITAL SIGNS:  Temperature 99.1, blood pressure 106/54, heart rate 96 and regular.   GENERAL:  Well-developed, well-nourished, alert and oriented, does not look acutely ill.   SKIN:  No rashes or nodules.   HEENT:  Eyes:  No subconjunctival hemorrhages or scleral icterus.  Oropharynx without erythema or exudate.   NECK:  Supple, no thyromegaly.   LYMPH:  No cervical or axillary lymphadenopathy.   LUNGS:  Clear to auscultation, no use of accessory muscles of respiration.   COR:  S1, S2 normal, no S3, S4 or murmur.   ABDOMEN:  Soft, nontender, no mass or hepatosplenomegaly.   EXTREMITIES:  There is mild bilateral lower extremity erythema with 2+ lower leg edema bilaterally.  There is no warmth to the skin.      For further details of the patient's history, please refer to the chart.  Thank you very much for this consultation.         MARCIA BRANDON MD             D: 2017 07:36   T: 2017 08:02   MT: [email or web address]      Name:     DARWIN JASSO   MRN:      [id number]        Account:       [id number]   :      1945           Consult Date:  2017      Document: [id number]       cc: Enedelia Rangel MD

## 2017-06-07 NOTE — H&P
DATE OF ADMISSION:  06/06/2017.      PRIMARY CARE PHYSICIAN:  Dr. Alison Pena.      CHIEF COMPLAINT:  Bilateral lower extremity swelling and redness.      HISTORY OF PRESENT ILLNESS:  Had been obtained from the patient who is a relatively good historian.  I also discussed with ER attending and I reviewed her chart.      Ms. Kavitha Hartman is a pleasant 71-year-old female with past medical history of hypertension, dyslipidemia, obesity status post gastric bypass, alcohol abuse, chronic pain syndrome on a pain agreement, history of left lower extremity cellulitis, history of macrocytic anemia, moderate aortic stenosis, chronic kidney disease stage III, history of supraventricular tachycardia, depression/anxiety, who came in for evaluation of bilateral lower extremity swelling and erythema.  She reports that usually her legs are not swollen, although she does take Lasix at home p.r.n. for leg swelling.  She states that she started having more swelling in her lower extremities 2 weeks ago.  Initially it was more obvious on left lower extremity.  Then, more obvious on the right lower extremity.  She went to see her primary care physician for this.  It seems that on 05/26 she was started on clindamycin. Apparently she started having nausea and diarrhea while she was on clindamycin, so when she saw her primary care physician again on 05/30, the clindamycin was stopped and she was started on minocycline.  On 06/02, she was seen again in her primary care physician's office and she was told to start taking Keflex as well in addition to minocycline.  Despite all these different antibiotics, she continued to have lower extremity swelling, redness and some warmth reportedly.  She states that for the last few days she started taking Lasix 20 mg daily, but despite this, swelling again did not improve.  Upon further questioning, she denies any fever at home.  She denies any chest pain.  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  She also reports some weight gain of 8 pounds in the last 8 weeks or so.  She denies any headache.  She did have some diarrhea while she was on clindamycin, now no more diarrhea, just some loose stools, no bowel movement today.  She denies any dysuria.      In the ER, the patient was seen by Dr. Martínez.  Her initial vitals showed blood pressure of 138/81, heart rate was 96, respiratory rate 18, oxygen saturation 96% on room air, and T-max in the ER was 99.1.  She did have basic blood work which showed pretty significant abnormalities with BMP with low sodium of 129, potassium of 2.5, chloride 18, bicarbonate of 31, BUN 20, creatinine 1.02, anion gap of 18.  Her LFTs with albumin 2.6, total protein 6, total bilirubin 2.3, alkaline phosphatase 199, , AST elevated at 338, glucose 99.  CBC with no leukocytosis, normal hemoglobin, hematocrit mildly decreased at 34.8, normal platelet count.  Blood cultures drawn in the ER and pending at this time.  She had an ultrasound of her lower extremities in the ER which was negative for DVT.  In the ER, the patient received 1 dose of vancomycin IV and Hospitalist Service was called regarding the admission.      PAST MEDICAL HISTORY:   1.  Hypertension.   2.  Dyslipidemia.   3.  History of mild coronary artery disease with cardiac catheterization in 09/2015 showing mild distal branch disease.   4.  History of left lower extremity cellulitis.   5.  History of obesity, status post gastric bypass surgery.   6.  Chronic insomnia.   7.  Chronic lower back pain, on pain agreement.   8.  History of alcohol abuse.   9.  History of hyponatremia.   10.  History of depression/anxiety.   11.  History of moderate aortic stenosis with last echocardiogram in 08/2015 showing aortic valve area of 1.1 square cm.   12.  History of microcytic anemia.   13.   History of supraventricular tachycardia.   14.  History of chronic kidney disease, stage III.      PAST SURGICAL HISTORY:   1.  Status post gastric bypass surgery in .   2.  Bilateral knee replacement.   3.  Right hip arthroplasty.   4.  Appendectomy.   5.  Mediastinoscopy with mediastinal adenopathy, biopsy.   6.  Bilateral carpal tunnel release.   7.  Cholecystectomy.   8.  Cystocele repair via da Kimber laparoscopic intervention.   9.  Cystoscopy with lithotripsy.   10.  Mammoplasty with breast augmentation, bilateral.   11.  Abdominal laparotomy with lysis of adhesions and ventral hernia repair in 2004.   12.  Vaginal hysterectomy and bilateral salpingo-oophorectomy for a myoma and bleeding.      SOCIAL HISTORY:  The patient lives at home.  She states she never smoked.  She does admit to drinking 2-3 cocktails per night and she denies illicit drug abuse.      FAMILY HISTORY:  Reviewed with the patient, it seems that her mother  because of rare blood disease.  Her father had COPD and lung cancer.  She has 2 sisters and 1 brother apparently in good state of health.  She has 4 children, again in good state of health.      MEDICATIONS PRIOR TO ADMISSION:   1.  Gabapentin 300 mg by mouth daily as needed in the morning.   2.  Gabapentin 600 mg by mouth at bedtime.   3.  Reglan 5 mg by mouth 4 times daily p.r.n.   4.  Recently started minocycline and Keflex.   5.  Lasix 20 mg by mouth daily p.r.n.   6.  Trazodone 100 mg p.o. at bedtime.   7.  Valtrex 2 tablets by mouth q.12 h. p.r.n. for outbreaks of cold sores.   8.  Temazepam 15 mg by mouth at bedtime as needed for sleep.   9.  Remeron 45 mg p.o. at bedtime.   10.  Tramadol 50 mg by mouth twice daily p.r.n. for moderate pain.   11.  Naproxen 500 mg by mouth twice daily p.r.n. for moderate pain.   12.  Metoprolol 25 mg p.o. daily.   13.  Lipitor 40 mg p.o. daily.   14.  Multivitamin 1 tablet p.o. daily.   15.  Aspirin 81 mg p.o. daily.   16.  Potassium  chloride 20 mEq by mouth daily.      ALLERGIES:  She is allergic to Bactrim, codeine and morphine.      REVIEW OF SYSTEMS:  A 10-point review of systems was conducted and it was negative except for pertinent positives mentioned in history of present illness.      PHYSICAL EXAMINATION:   VITAL SIGNS:  Blood pressure is 105/54, heart rate 96, respiratory rate 18, oxygen saturation 97% on room air, temperature 99.1 degrees Fahrenheit.   GENERAL:  The patient is awake, alert, no acute distress at the time of my examination.   HEENT:  Head is normocephalic, atraumatic.  Pupils are equally round and reactive to light.  Oral mucosa is moist.   NECK:  Supple.  No cervical lymphadenopathy, no thyromegaly.   CHEST:  There is bilateral air entry, no wheezing, no rales, no crackles.   CARDIOVASCULAR:  There is normal S1, S2, regular rate and rhythm.  There is a systolic murmur 3/6 intensity, precordial area.  No rubs.   ABDOMEN:  Soft, nontender, nondistended.  Bowel sounds are present.   EXTREMITIES:  There is 1+ pitting indurated edema bilateral lower extremities with some redness extended up to her knees, no blisters, no open wounds, no obvious warmness noted, 2+ peripheral pulses are palpable.   SKIN:  Intact, no cyanosis.  There is some redness on bilateral lower extremities below her knees as mentioned above.   NEUROLOGIC:  The patient is awake, alert, oriented to self, place and time.  There are no focal neurological deficits.   PSYCHIATRIC:  Normal mood and normal affect.   MUSCULOSKELETAL:  She moves all extremities freely, there are no obvious joint deformities.      DIAGNOSTIC DATA:  Sodium is 129, potassium 2.5 initially, chloride 80, bicarbonate 39, BUN 20, creatinine 1.01, calcium is 8.8, anion gap of 18, albumin 2.6, total protein 6, total bilirubin 2.3, alkaline phosphatase 199, , .  White blood cells 6.3, hemoglobin 12.9, hematocrit 34.8, platelet count 217.  Blood culture is pending at this time.   Ultrasound of lower extremities bilaterally negative for DVT.      ASSESSMENT AND PLAN:  Ms. Kavitha Hartman is a pleasant 71-year-old female with past medical history of hypertension, dyslipidemia, depression/anxiety, alcohol abuse, history of mild coronary artery disease, moderate aortic stenosis, microcytic anemia, chronic back pain with signed pain agreement, history of left lower extremity cellulitis, history of obesity status post bariatric surgery, who came in for evaluation of bilateral lower extremity swelling and erythema with initial concern for cellulitis.   1.  Bilateral lower extremity swelling, concerning for cellulitis, although this might represent venous stasis given the fact that it is bilateral location:  There is some discoloration of the legs, but no obvious warmness noted.  She does not have any fever and no elevated white blood cells.  She was treated with different courses of antibiotics as an outpatient including clindamycin, minocycline and Keflex without significant improvement.  Ultrasound of lower extremities bilaterally was negative for deep venous thrombosis.  The plan for now is to continue with intravenous vancomycin that was started in the emergency room.  We will follow up blood cultures.  We will follow up fever curve and white blood cell trend, and an Infectious Disease consult was requested for the morning.  I will give her 1 dose of Lasix intravenous 40 mg once tonight and to see if it will help in improving her lower extremity swelling.  I will also order an echocardiogram of the heart to rule out any evidence of congestive heart failure, since she does reports some symptoms concerning for congestive heart failure exacerbation such as recent exertional shortness of breath, some weight gain and bilateral lower extremity swelling.   2.  Hyponatremia with sodium of 129:  I do not see that she had low sodium levels in the past.  This might represent hypovolemic versus euvolemic  hyponatremia.  We will plan to check her UA, urine sodium, serum osmolality as well as urine osmolality to differentiate between different causes of hyponatremia.  Since she does seem dry, I do not think she needs intravenous fluids at this time.  As I mentioned above, I will give 1 dose of Lasix 40 mg intravenous and we will plan to repeat sodium in the morning.   3.  Hypokalemia with potassium of 2.5 on admission:  This might be related with recent daily use of Lasix.  She did receive some potassium supplementation in the emergency room, repeated potassium is 3.1.  Since I am planning to give an extra dose of Lasix intravenous tonight, I am anticipating that her potassium will be on lower side as well, so potassium replacement protocol has been ordered.  If patient will be decided to be sent home on daily potassium, she should be on potassium supplementation.   4.  Abnormal liver function tests with elevated , :  Her total bilirubin is also elevated at 2.3.  I think this is related to her daily alcohol drinking.  Will attempt to hold her prior to admission statin, repeat LFTs in the morning and check an ultrasound of right upper quadrant.  If her LFTs are not improving, consider hepatitis serology studies.   5.  History of hypertension:  Blood pressure is well controlled for now, will continue her prior to admission metoprolol with holding parameters.   6.  Alcohol abuse:  She reports drinking 2-3 cocktails per night.  She denies having history of alcohol withdrawal in the past.  Her current blood work somehow suggests chronic alcoholic use with hyponatremia, hypokalemia, and elevated liver function tests.  We will check her INR as well.  I did advise her to quit drinking.  I did not put her on CIWA protocol for now, will just monitor, but of course if she presents with any evidence of alcohol withdrawal such as shakiness tachycardia, agitation, diaphoresis, we will start CIWA protocol.  I did start  her though on folic acid, thiamine and multivitamin.   7.  History of depression/anxiety:  Will continue with her prior to admission Remeron and trazodone.   8.  History of insomnia:  I will continue with her prior to admission temazepam, but I will decrease the dose from 50 mg to 7.5 mg at bedtime p.r.n.   9.  History of chronic pain, on narcotic agreement:  As per her primary care physician, will continue her prior to admission Ultram 50 mg by mouth twice daily p.r.n. as well as her prior to admission gabapentin.   10.  History of moderate aortic stenosis:  Her last echocardiogram was in 2015.  She stated that she is due for a new echocardiogram.  I am also wondering if some of her symptoms are related to some degree of congestive heart failure exacerbation, will plan to have an echocardiogram at this time.   11.  History of dyslipidemia:  As mentioned above, plan to hold her prior to admission Lipitor for now given abnormal liver function tests.   12.  Deep venous thrombosis prophylaxis:  I have started her on Lovenox 40 mg subcutaneously daily.   13.  Code status:  Was discussed with the patient and patient is full code.   14.  Disposition:  I anticipate at least 2 days of hospitalization pending clinical improvement.         TOMAS MEDRANO MD             D: 2017 01:16   T: 2017 04:24   MT: areli      Name:     DARWIN JASSO   MRN:      [id number]        Account:      [id number]   :      1945           Admitted:     [id number]      Document: [id number]       cc: Alison Pena MD

## 2017-06-07 NOTE — PLAN OF CARE
Problem: Goal Outcome Summary  Goal: Goal Outcome Summary  Outcome: Improving  Pt admitted 6/6 with cellulitis to bilateral LE. Pt is A&O x4. No complaints of pain. VSS on room air. Pt is up with SBA. Electrolytes replaced. IV abx. 2gm NA diet.  Continue to monitor.

## 2017-06-08 LAB
ALBUMIN SERPL-MCNC: 2.3 G/DL (ref 3.4–5)
ALP SERPL-CCNC: 180 U/L (ref 40–150)
ALT SERPL W P-5'-P-CCNC: 82 U/L (ref 0–50)
ANION GAP SERPL CALCULATED.3IONS-SCNC: 7 MMOL/L (ref 3–14)
AST SERPL W P-5'-P-CCNC: 142 U/L (ref 0–45)
BASOPHILS # BLD AUTO: 0.1 10E9/L (ref 0–0.2)
BASOPHILS NFR BLD AUTO: 1.6 %
BILIRUB DIRECT SERPL-MCNC: 3.7 MG/DL (ref 0–0.2)
BILIRUB SERPL-MCNC: 4.1 MG/DL (ref 0.2–1.3)
BUN SERPL-MCNC: 22 MG/DL (ref 7–30)
CALCIUM SERPL-MCNC: 8.3 MG/DL (ref 8.5–10.1)
CHLORIDE SERPL-SCNC: 85 MMOL/L (ref 94–109)
CO2 SERPL-SCNC: 37 MMOL/L (ref 20–32)
CREAT SERPL-MCNC: 1.19 MG/DL (ref 0.52–1.04)
DIFFERENTIAL METHOD BLD: ABNORMAL
EOSINOPHIL # BLD AUTO: 0.4 10E9/L (ref 0–0.7)
EOSINOPHIL NFR BLD AUTO: 6.5 %
ERYTHROCYTE [DISTWIDTH] IN BLOOD BY AUTOMATED COUNT: 14.5 % (ref 10–15)
GFR SERPL CREATININE-BSD FRML MDRD: 44 ML/MIN/1.7M2
GLUCOSE SERPL-MCNC: 64 MG/DL (ref 70–99)
HCT VFR BLD AUTO: 30.2 % (ref 35–47)
HGB BLD-MCNC: 11.1 G/DL (ref 11.7–15.7)
IMM GRANULOCYTES # BLD: 0 10E9/L (ref 0–0.4)
IMM GRANULOCYTES NFR BLD: 0.2 %
LYMPHOCYTES # BLD AUTO: 1.4 10E9/L (ref 0.8–5.3)
LYMPHOCYTES NFR BLD AUTO: 22.5 %
MCH RBC QN AUTO: 36.9 PG (ref 26.5–33)
MCHC RBC AUTO-ENTMCNC: 36.8 G/DL (ref 31.5–36.5)
MCV RBC AUTO: 100 FL (ref 78–100)
MONOCYTES # BLD AUTO: 0.7 10E9/L (ref 0–1.3)
MONOCYTES NFR BLD AUTO: 11.4 %
NEUTROPHILS # BLD AUTO: 3.6 10E9/L (ref 1.6–8.3)
NEUTROPHILS NFR BLD AUTO: 57.8 %
NRBC # BLD AUTO: 0.1 10*3/UL
NRBC BLD AUTO-RTO: 1 /100
PLATELET # BLD AUTO: 143 10E9/L (ref 150–450)
POTASSIUM SERPL-SCNC: 4.3 MMOL/L (ref 3.4–5.3)
PROT SERPL-MCNC: 5 G/DL (ref 6.8–8.8)
RBC # BLD AUTO: 3.01 10E12/L (ref 3.8–5.2)
SODIUM SERPL-SCNC: 129 MMOL/L (ref 133–144)
WBC # BLD AUTO: 6.3 10E9/L (ref 4–11)

## 2017-06-08 PROCEDURE — 80048 BASIC METABOLIC PNL TOTAL CA: CPT | Performed by: INTERNAL MEDICINE

## 2017-06-08 PROCEDURE — 25000132 ZZH RX MED GY IP 250 OP 250 PS 637: Mod: GY | Performed by: INTERNAL MEDICINE

## 2017-06-08 PROCEDURE — 12000000 ZZH R&B MED SURG/OB

## 2017-06-08 PROCEDURE — 36415 COLL VENOUS BLD VENIPUNCTURE: CPT | Performed by: INTERNAL MEDICINE

## 2017-06-08 PROCEDURE — 99233 SBSQ HOSP IP/OBS HIGH 50: CPT | Performed by: INTERNAL MEDICINE

## 2017-06-08 PROCEDURE — A9270 NON-COVERED ITEM OR SERVICE: HCPCS | Mod: GY | Performed by: INTERNAL MEDICINE

## 2017-06-08 PROCEDURE — 25000128 H RX IP 250 OP 636: Performed by: INTERNAL MEDICINE

## 2017-06-08 PROCEDURE — 85025 COMPLETE CBC W/AUTO DIFF WBC: CPT | Performed by: INTERNAL MEDICINE

## 2017-06-08 PROCEDURE — 80076 HEPATIC FUNCTION PANEL: CPT | Performed by: INTERNAL MEDICINE

## 2017-06-08 RX ORDER — LORAZEPAM 1 MG/1
1-2 TABLET ORAL EVERY 30 MIN PRN
Status: DISCONTINUED | OUTPATIENT
Start: 2017-06-08 | End: 2017-06-14

## 2017-06-08 RX ORDER — FUROSEMIDE 10 MG/ML
40 INJECTION INTRAMUSCULAR; INTRAVENOUS ONCE
Status: DISCONTINUED | OUTPATIENT
Start: 2017-06-08 | End: 2017-06-08

## 2017-06-08 RX ORDER — LORAZEPAM 2 MG/ML
1-2 INJECTION INTRAMUSCULAR EVERY 30 MIN PRN
Status: DISCONTINUED | OUTPATIENT
Start: 2017-06-08 | End: 2017-06-14

## 2017-06-08 RX ADMIN — CEFAZOLIN SODIUM 1 G: 1 INJECTION, POWDER, FOR SOLUTION INTRAMUSCULAR; INTRAVENOUS at 16:49

## 2017-06-08 RX ADMIN — FOLIC ACID 1 MG: 1 TABLET ORAL at 09:02

## 2017-06-08 RX ADMIN — ASPIRIN 81 MG: 81 TABLET, COATED ORAL at 09:02

## 2017-06-08 RX ADMIN — MIRTAZAPINE 45 MG: 15 TABLET, FILM COATED ORAL at 22:05

## 2017-06-08 RX ADMIN — METOPROLOL SUCCINATE 25 MG: 25 TABLET, EXTENDED RELEASE ORAL at 09:02

## 2017-06-08 RX ADMIN — THERA TABS 1 TABLET: TAB at 09:02

## 2017-06-08 RX ADMIN — GABAPENTIN 600 MG: 600 TABLET, FILM COATED ORAL at 22:05

## 2017-06-08 RX ADMIN — LORAZEPAM 1 MG: 1 TABLET ORAL at 16:49

## 2017-06-08 RX ADMIN — CEFAZOLIN SODIUM 1 G: 1 INJECTION, POWDER, FOR SOLUTION INTRAMUSCULAR; INTRAVENOUS at 23:57

## 2017-06-08 RX ADMIN — TEMAZEPAM 7.5 MG: 7.5 CAPSULE ORAL at 22:06

## 2017-06-08 RX ADMIN — TRAZODONE HYDROCHLORIDE 100 MG: 100 TABLET ORAL at 22:06

## 2017-06-08 RX ADMIN — THIAMINE HCL (VITAMIN B1) 50 MG TABLET 50 MG: at 09:02

## 2017-06-08 RX ADMIN — CEFAZOLIN SODIUM 1 G: 1 INJECTION, POWDER, FOR SOLUTION INTRAMUSCULAR; INTRAVENOUS at 08:17

## 2017-06-08 RX ADMIN — GABAPENTIN 300 MG: 300 CAPSULE ORAL at 09:02

## 2017-06-08 RX ADMIN — ENOXAPARIN SODIUM 40 MG: 40 INJECTION SUBCUTANEOUS at 22:04

## 2017-06-08 NOTE — PLAN OF CARE
Problem: Goal Outcome Summary  Goal: Goal Outcome Summary  Outcome: Improving  Pt is A&O X4 Cms intact. Pain is well controled. Up at sunshine. Voiding well and passing flatus. Pt talked to about chemical dependency and feel she may need to seek help. Swell in and redness on legs is improving. Continue to monitor.

## 2017-06-08 NOTE — PROGRESS NOTES
X cover - medication error, duplicative medication injection.    Nursing informed me that lovenox 40 mg sq given for DVT prophylaxis was given 2 time a few hours apart.  Based on patient's weight, this should be ok and she shouldn't have any adverse consequences.  Will need to watch for bleeding complications, though INR is normal even if she has underlying liver disease.    Moe Mckeon MD

## 2017-06-08 NOTE — PROGRESS NOTES
[facility]    Internal Medicine Hospitalist Progress Note  06/08/2017  I evaluated patient on the above date.    Nik Dejesus Jr., MD  [phone] (p)  Text Page (7 am to 6 pm)      Assessment & Plan Ms. Kavitha Hartman is a pleasant 71-year-old female with past medical history including HTN, DLD, mod AS, dep/anx, EtOH abuse, chronic back pain, recent LE cellulitis ond oral antibiotics, who presented 6/6 with worsening LE erythema and swelling.    BLE cellulitis vs worsening chronic venous stasis.  * Recently treated for cellulitis with abx including clindamycin, then switched to minocycline and Keflex (due to intolerance).  * On initial eval 6/6, pt afebrile, nl WBC. BLE US 6/6 negative. Started on IV vanco 6/6. Given a dose of IV Lasix 6/6.  * Improved 6/7. Procal 1.99 6/7. Seen by ID and abx changed to IV cefazolin 6/7.   * Micro: BC's 6/6 NGTD.  - Continue cefazolin (started 6/7).  - Keep BLE's elevated.  - Monitor cultures.  - Apprec help from Dr. Arreguin.  - Mgmt of LE edema as below.    Chronic LE edema.  * PTA on PRN Lasix 20 mg daily.   * No signs of pulm edema or other signs of CHF.   * Given IV Lasix 6/6 as above.   * Leg swelling improved 6/7. Echo 6/7 showed LVEF >70%, suggestion of impaired LV relaxation, mod AS, ascending aorta is mildly dilated.   - Continue leg elevation.    Abnormal liver function tests, suspect from acute EtOH hepatitis, possible chronic liver disease.  * On admission with with elevated , , total bilirubin 2.3. Statin held on admission.  * Abd US 6/7 showed no acute findings, +fattly liver infiltration.  * Overall, suspect related to her daily alcohol drinking.   * Some improvement in LFT's 6/7.  * Bili up to 4.1 and ALP slightly worse, but AST and ALT better.  - Decrease in or abstinence from EtOH recommended.  - Monitor LFT's; if bili continues to rise, will ask GI to see.  - Hold statin for now.    Acute kidney injury, stage 1.  * Possibly prerenal.    Recent Labs  Lab 06/08/17  0746 06/07/17  0720 06/06/17  2155 06/06/17  1457   CR 1.19* 0.88 0.89 1.02   - Monitor BMP.  - Avoid nephrotoxic medications.    Hyponatremia, possibly nutrional or related to volume overload, ?due to liver disease.  * Na 129 on admission 6/6. Given Lasix 6/6.  * Hyponatremia workup labs not suggestive of SIADH, possibly due to CHF/volume overload.   Recent Labs  Lab 06/08/17  0746 06/07/17  0720 06/06/17  1457   * 130* 129*   - Monitor BMP.    EtOH abuse.  * She reported drinking 2-3 cocktails per night. She denied having history of alcohol withdrawal in the past.    * Her blood work here suggests chronic alcoholic use with hyponatremia, hypokalemia, and elevated liver function tests; also fatty liver on US. INR normal.  * More tremulous today 6/8.  - Start on CIWA protocol.  - Abstinence from EtOH recommended.  - Ask Psychiatry to see.    Hypokalemia, could be due nutritional or related to PRN Lasix at home.  - Continue K replacement protocol.    Moderate aortic stenosis.  * Her last echocardiogram was in 08/2015.  Follows with [facility].  * Echo 6/7 showed LVEF >70%, suggestion of impaired LV relaxation, mod AS, ascending aorta is mildly dilated.  - Monitor i/o's, daily wts.  - F/u Cardiologist for further monitoring.    Hypertension (benign essential).  [PTA: metoprolol XL 25 mg daily.]  - Continue metoprolol XL.    Dyslipidemia.  - Hold atorvastatin as above.    Depression/anxiety.   - Continue Remeron and trazodone.   - Ask Psychiatry to see.    Insomnia.  - Continue PRN temazepam.    Chronic back pain.  * On narcotic agreement.  - Continue gabapentin, PTA PRN Ultram 50 mg BID.    Prophylaxis.  - PCD's.  - Lovenox.    CODE STATUS: FULL    Dispo.  - Pending above.  - 1-2 more days.    I d/w pt's daughter 6/8.    Interval History   Doing OK. Tolerating diet. Feels more tremulous today. No CP or SOB.    -Data reviewed today: I reviewed all new labs and imaging over the  "last 24 hours. I personally reviewed no images or EKG's today.    Physical Exam   Heart Rate: 75, Blood pressure 127/78, pulse 90, temperature 98.2  F (36.8  C), temperature source Oral, resp. rate 18, height 1.626 m (5' 4\"), weight 86.7 kg (191 lb 2.2 oz), SpO2 96 %, not currently breastfeeding.  Vitals:    06/06/17 2026 06/07/17 0703 06/08/17 0500   Weight: 85.2 kg (187 lb 13.3 oz) 85 kg (187 lb 6.3 oz) 86.7 kg (191 lb 2.2 oz)     Vital Signs with Ranges  Temp:  [98.2  F (36.8  C)-98.6  F (37  C)] 98.2  F (36.8  C)  Pulse:  [90] 90  Heart Rate:  [75-82] 75  Resp:  [16-18] 18  BP: ()/(60-78) 127/78  SpO2:  [91 %-96 %] 96 %  Patient Vitals for the past 24 hrs:   BP Temp Temp src Pulse Heart Rate Resp SpO2 Weight   06/08/17 0814 127/78 98.2  F (36.8  C) Oral 90 - 18 96 % -   06/08/17 0500 - - - - - - - 86.7 kg (191 lb 2.2 oz)   06/07/17 2328 101/68 98.5  F (36.9  C) Oral - 75 16 91 % -   06/07/17 1900 113/75 98.5  F (36.9  C) Oral - 82 16 94 % -   06/07/17 1532 94/60 98.6  F (37  C) Oral - 82 16 96 % -     I/O's Last 24 hours  I/O last 3 completed shifts:  In: -   Out: 250 [Urine:250]    Constitutional: Jaundiced. Alert, oriented, pleasant, slightly tremulous.  Respiratory: Clear, no crackles/wheezes.  Cardiovascular: RRR, +I-II/VI sys m, no g/r.  GI: Soft, nt, +BS.  Skin/Integumen: BLE trace to 1+ edema, with chronic venous stasis changes.  Other:        Data     Recent Labs  Lab 06/08/17  0746 06/07/17 2010 06/07/17  1720 06/07/17  0720 06/06/17  2155 06/06/17  1457   WBC 6.3  --   --  6.2  --  6.3   HGB 11.1*  --   --  11.3*  --  12.9     --   --  99  --  102*   *  --   --  166  --  217   INR  --   --   --  1.02  --   --    *  --   --  130*  --  129*   POTASSIUM 4.3 3.7 Canceled, Test credited Specimen Lost, Testing unable to be completedDIEGO BREAUX RN IN 66 NOTIFIED @ 2005 FK 2.8* 3.1* 2.5*   CHLORIDE 85*  --   --  85*  --  80*   CO2 37*  --   --  36*  --  31   BUN 22  --   --  18  --  " 20   CR 1.19*  --   --  0.88 0.89 1.02   ANIONGAP 7  --   --  9  --  18*   BIRGIT 8.3*  --   --  7.9*  --  8.8   GLC 64*  --   --  66*  --  99   ALBUMIN 2.3*  --   --  2.2*  --  2.6*   PROTTOTAL 5.0*  --   --  5.0*  --  6.0*   BILITOTAL 4.1*  --   --  2.4*  --  2.3*   ALKPHOS 180*  --   --  173*  --  199*   ALT 82*  --   --  115*  --  160*   *  --   --  212*  --  338*     Recent Labs   Lab Test  06/08/17   0746  06/07/17   0720  06/06/17   1457  05/26/17   1619  08/17/16   1235   04/11/12   1214  04/11/12   0649   GLC  64*  66*  99  101*  111*   < >   --    --    BGM   --    --    --    --    --    --   134*  112*    < > = values in this interval not displayed.         No results found for this or any previous visit (from the past 24 hour(s)).    Medications   All medications were reviewed.       ceFAZolin  1 g Intravenous Q8H     aspirin EC  81 mg Oral Daily     gabapentin (NEURONTIN) tablet 600 mg  600 mg Oral At Bedtime     gabapentin (NEURONTIN) capsule 300 mg  300 mg Oral Daily     metoprolol  25 mg Oral Daily     mirtazapine  45 mg Oral At Bedtime     multivitamin, therapeutic  1 tablet Oral Daily     traZODone  100 mg Oral At Bedtime     sodium chloride (PF)  3 mL Intracatheter Q8H     enoxaparin  40 mg Subcutaneous Q24H     folic acid  1 mg Oral Daily     vitamin  B-1  50 mg Oral Daily

## 2017-06-08 NOTE — PLAN OF CARE
Problem: Goal Outcome Summary  Goal: Goal Outcome Summary  Outcome: Improving  Care Plan Summary Note:Alert and oriented. Appetite ok. Denies pain. Swelling and redness in legs improving slowly. Bilirubin elevated,jaundiced. Creatinine elevated so lasix held. VSS. CIWAs and psych eval ordered,hx of drinking 1-2 drinks/day-is sl tremulous.

## 2017-06-08 NOTE — PROGRESS NOTES
"[facility]  Infectious Disease Progress Note          Assessment and Plan:   IMPRESSION:   1.  Kavitha Headley is a 71-year-old female with history of morbid obesity.  Status post gastric bypass surgery.   2.  History of alcohol abuse.   3.  Chronic pain syndrome.   4.  Admitted with a diagnosis of bilateral lower leg cellulitis with failed outpatient antibiotic therapy.  I suspect that this more likely represents venous stasis disease of the legs rather than cellulitis given the bilateral nature of the redness, as well as absence of fever and leukocytosis.  5.  Sulfa allergy.       RECOMMENDATIONS:   1.  cont Ancef and leg elevation one more day.  2. Consider Lymphedema Clinic post discharge.        Interval History:   Legs feel better.  Afebrile.  Decreased leg erythema and swelling.  procalcitonin 1.99.              Medications:       ceFAZolin  1 g Intravenous Q8H     aspirin EC  81 mg Oral Daily     gabapentin (NEURONTIN) tablet 600 mg  600 mg Oral At Bedtime     gabapentin (NEURONTIN) capsule 300 mg  300 mg Oral Daily     metoprolol  25 mg Oral Daily     mirtazapine  45 mg Oral At Bedtime     multivitamin, therapeutic  1 tablet Oral Daily     traZODone  100 mg Oral At Bedtime     sodium chloride (PF)  3 mL Intracatheter Q8H     enoxaparin  40 mg Subcutaneous Q24H     folic acid  1 mg Oral Daily     vitamin  B-1  50 mg Oral Daily                  Physical Exam:   Blood pressure 101/68, pulse 80, temperature 98.5  F (36.9  C), temperature source Oral, resp. rate 16, height 1.626 m (5' 4\"), weight 86.7 kg (191 lb 2.2 oz), SpO2 91 %, not currently breastfeeding.  [unfilled]  Vital Signs with Ranges  Temp:  [98.5  F (36.9  C)-98.6  F (37  C)] 98.5  F (36.9  C)  Heart Rate:  [75-82] 75  Resp:  [16] 16  BP: ()/(60-75) 101/68  SpO2:  [91 %-96 %] 91 %    Constitutional: Awake, alert, cooperative, no apparent distress   Lungs: Clear to auscultation bilaterally, no crackles or wheezing "   Cardiovascular: Regular rate and rhythm, normal S1 and S2, and no murmur noted   Abdomen: Normal bowel sounds, soft, non-distended, non-tender   Skin: No rashes, no cyanosis, no edema   Other: Leg erythema largely resolved.          Data:   All microbiology laboratory data reviewed.  Recent Labs   Lab Test  06/07/17   0720  06/06/17   1457  05/26/17   1619   WBC  6.2  6.3  8.1   HGB  11.3*  12.9  13.6   HCT  30.2*  34.8*  37.3   MCV  99  102*  103*   PLT  166  217  242     Recent Labs   Lab Test  06/07/17   0720  06/06/17   2155  06/06/17   1457   CR  0.88  0.89  1.02     Recent Labs   Lab Test  04/23/12   1335   SED  34*

## 2017-06-09 LAB
ALBUMIN SERPL-MCNC: 2.4 G/DL (ref 3.4–5)
ALBUMIN UR-MCNC: 10 MG/DL
ALP SERPL-CCNC: 219 U/L (ref 40–150)
ALT SERPL W P-5'-P-CCNC: 48 U/L (ref 0–50)
ANION GAP SERPL CALCULATED.3IONS-SCNC: 8 MMOL/L (ref 3–14)
APPEARANCE UR: ABNORMAL
AST SERPL W P-5'-P-CCNC: 110 U/L (ref 0–45)
BASOPHILS # BLD AUTO: 0.1 10E9/L (ref 0–0.2)
BASOPHILS NFR BLD AUTO: 1.1 %
BILIRUB DIRECT SERPL-MCNC: 4.8 MG/DL (ref 0–0.2)
BILIRUB SERPL-MCNC: 5.4 MG/DL (ref 0.2–1.3)
BILIRUB UR QL STRIP: NEGATIVE
BUN SERPL-MCNC: 26 MG/DL (ref 7–30)
CALCIUM SERPL-MCNC: 8.5 MG/DL (ref 8.5–10.1)
CAOX CRY #/AREA URNS HPF: ABNORMAL /HPF
CHLORIDE SERPL-SCNC: 84 MMOL/L (ref 94–109)
CO2 SERPL-SCNC: 34 MMOL/L (ref 20–32)
COLOR UR AUTO: ABNORMAL
CREAT SERPL-MCNC: 1.42 MG/DL (ref 0.52–1.04)
CREAT UR-MCNC: 108 MG/DL
CREAT UR-MCNC: 108 MG/DL
DIFFERENTIAL METHOD BLD: ABNORMAL
EOSINOPHIL # BLD AUTO: 0.4 10E9/L (ref 0–0.7)
EOSINOPHIL NFR BLD AUTO: 5.9 %
ERYTHROCYTE [DISTWIDTH] IN BLOOD BY AUTOMATED COUNT: 14.7 % (ref 10–15)
FRACT EXCRET NA UR+SERPL-RTO: NORMAL %
GFR SERPL CREATININE-BSD FRML MDRD: 36 ML/MIN/1.7M2
GLUCOSE SERPL-MCNC: 88 MG/DL (ref 70–99)
GLUCOSE UR STRIP-MCNC: NEGATIVE MG/DL
HAV IGG SER QL IA: NORMAL
HBV CORE AB SERPL QL IA: NONREACTIVE
HBV CORE IGM SERPL QL IA: NORMAL
HBV SURFACE AB SERPL IA-ACNC: 0 M[IU]/ML
HBV SURFACE AG SERPL QL IA: NONREACTIVE
HCT VFR BLD AUTO: 33.6 % (ref 35–47)
HCV AB SERPL QL IA: NORMAL
HGB BLD-MCNC: 12.1 G/DL (ref 11.7–15.7)
HGB UR QL STRIP: NEGATIVE
IMM GRANULOCYTES # BLD: 0 10E9/L (ref 0–0.4)
IMM GRANULOCYTES NFR BLD: 0.3 %
KETONES UR STRIP-MCNC: 5 MG/DL
LEUKOCYTE ESTERASE UR QL STRIP: ABNORMAL
LYMPHOCYTES # BLD AUTO: 1 10E9/L (ref 0.8–5.3)
LYMPHOCYTES NFR BLD AUTO: 13.4 %
MCH RBC QN AUTO: 36.6 PG (ref 26.5–33)
MCHC RBC AUTO-ENTMCNC: 36 G/DL (ref 31.5–36.5)
MCV RBC AUTO: 102 FL (ref 78–100)
MONOCYTES # BLD AUTO: 0.7 10E9/L (ref 0–1.3)
MONOCYTES NFR BLD AUTO: 9.9 %
NEUTROPHILS # BLD AUTO: 5.2 10E9/L (ref 1.6–8.3)
NEUTROPHILS NFR BLD AUTO: 69.4 %
NITRATE UR QL: NEGATIVE
NRBC # BLD AUTO: 0.1 10*3/UL
NRBC BLD AUTO-RTO: 1 /100
PH UR STRIP: 5.5 PH (ref 5–7)
PLATELET # BLD AUTO: 118 10E9/L (ref 150–450)
POTASSIUM SERPL-SCNC: 4.4 MMOL/L (ref 3.4–5.3)
PROT SERPL-MCNC: 5.6 G/DL (ref 6.8–8.8)
PROT UR-MCNC: 0.31 G/L
PROT/CREAT 24H UR: 0.29 G/G CR (ref 0–0.2)
RBC # BLD AUTO: 3.31 10E12/L (ref 3.8–5.2)
RBC #/AREA URNS AUTO: 1 /HPF (ref 0–2)
SODIUM SERPL-SCNC: 126 MMOL/L (ref 133–144)
SODIUM UR-SCNC: 6 MMOL/L
SP GR UR STRIP: 1.02 (ref 1–1.03)
SQUAMOUS #/AREA URNS AUTO: 8 /HPF (ref 0–1)
TRANS CELLS #/AREA URNS HPF: 2 /HPF (ref 0–1)
URN SPEC COLLECT METH UR: ABNORMAL
UROBILINOGEN UR STRIP-MCNC: NORMAL MG/DL (ref 0–2)
WBC # BLD AUTO: 7.4 10E9/L (ref 4–11)
WBC #/AREA URNS AUTO: 4 /HPF (ref 0–2)

## 2017-06-09 PROCEDURE — 86803 HEPATITIS C AB TEST: CPT | Performed by: INTERNAL MEDICINE

## 2017-06-09 PROCEDURE — 84156 ASSAY OF PROTEIN URINE: CPT | Performed by: INTERNAL MEDICINE

## 2017-06-09 PROCEDURE — 12000000 ZZH R&B MED SURG/OB

## 2017-06-09 PROCEDURE — 84300 ASSAY OF URINE SODIUM: CPT | Performed by: INTERNAL MEDICINE

## 2017-06-09 PROCEDURE — 87340 HEPATITIS B SURFACE AG IA: CPT | Performed by: INTERNAL MEDICINE

## 2017-06-09 PROCEDURE — A9270 NON-COVERED ITEM OR SERVICE: HCPCS | Mod: GY | Performed by: PSYCHIATRY & NEUROLOGY

## 2017-06-09 PROCEDURE — A9270 NON-COVERED ITEM OR SERVICE: HCPCS | Mod: GY | Performed by: INTERNAL MEDICINE

## 2017-06-09 PROCEDURE — 86706 HEP B SURFACE ANTIBODY: CPT | Performed by: INTERNAL MEDICINE

## 2017-06-09 PROCEDURE — 86705 HEP B CORE ANTIBODY IGM: CPT | Performed by: INTERNAL MEDICINE

## 2017-06-09 PROCEDURE — 81001 URINALYSIS AUTO W/SCOPE: CPT | Performed by: INTERNAL MEDICINE

## 2017-06-09 PROCEDURE — 99233 SBSQ HOSP IP/OBS HIGH 50: CPT | Performed by: INTERNAL MEDICINE

## 2017-06-09 PROCEDURE — 25000132 ZZH RX MED GY IP 250 OP 250 PS 637: Mod: GY | Performed by: INTERNAL MEDICINE

## 2017-06-09 PROCEDURE — 85025 COMPLETE CBC W/AUTO DIFF WBC: CPT | Performed by: INTERNAL MEDICINE

## 2017-06-09 PROCEDURE — 25000128 H RX IP 250 OP 636: Performed by: INTERNAL MEDICINE

## 2017-06-09 PROCEDURE — 36415 COLL VENOUS BLD VENIPUNCTURE: CPT | Performed by: INTERNAL MEDICINE

## 2017-06-09 PROCEDURE — 99222 1ST HOSP IP/OBS MODERATE 55: CPT | Performed by: PSYCHIATRY & NEUROLOGY

## 2017-06-09 PROCEDURE — 86704 HEP B CORE ANTIBODY TOTAL: CPT | Performed by: INTERNAL MEDICINE

## 2017-06-09 PROCEDURE — 80076 HEPATIC FUNCTION PANEL: CPT | Performed by: INTERNAL MEDICINE

## 2017-06-09 PROCEDURE — 82570 ASSAY OF URINE CREATININE: CPT | Performed by: INTERNAL MEDICINE

## 2017-06-09 PROCEDURE — 86708 HEPATITIS A ANTIBODY: CPT | Performed by: INTERNAL MEDICINE

## 2017-06-09 PROCEDURE — 80048 BASIC METABOLIC PNL TOTAL CA: CPT | Performed by: INTERNAL MEDICINE

## 2017-06-09 PROCEDURE — 25000132 ZZH RX MED GY IP 250 OP 250 PS 637: Mod: GY | Performed by: PSYCHIATRY & NEUROLOGY

## 2017-06-09 RX ORDER — SODIUM CHLORIDE 9 MG/ML
INJECTION, SOLUTION INTRAVENOUS CONTINUOUS
Status: DISCONTINUED | OUTPATIENT
Start: 2017-06-09 | End: 2017-06-10

## 2017-06-09 RX ORDER — QUETIAPINE FUMARATE 50 MG/1
50 TABLET, FILM COATED ORAL AT BEDTIME
Status: DISCONTINUED | OUTPATIENT
Start: 2017-06-09 | End: 2017-06-09

## 2017-06-09 RX ORDER — QUETIAPINE FUMARATE 25 MG/1
25 TABLET, FILM COATED ORAL AT BEDTIME
Status: DISCONTINUED | OUTPATIENT
Start: 2017-06-09 | End: 2017-06-09

## 2017-06-09 RX ORDER — CLINDAMYCIN HCL 300 MG
300 CAPSULE ORAL EVERY 8 HOURS SCHEDULED
Status: COMPLETED | OUTPATIENT
Start: 2017-06-09 | End: 2017-06-13

## 2017-06-09 RX ADMIN — GABAPENTIN 600 MG: 600 TABLET, FILM COATED ORAL at 21:33

## 2017-06-09 RX ADMIN — LORAZEPAM 1 MG: 1 TABLET ORAL at 05:58

## 2017-06-09 RX ADMIN — GABAPENTIN 300 MG: 300 CAPSULE ORAL at 08:38

## 2017-06-09 RX ADMIN — FOLIC ACID 1 MG: 1 TABLET ORAL at 08:38

## 2017-06-09 RX ADMIN — THIAMINE HCL (VITAMIN B1) 50 MG TABLET 50 MG: at 09:00

## 2017-06-09 RX ADMIN — SODIUM CHLORIDE: 9 INJECTION, SOLUTION INTRAVENOUS at 14:00

## 2017-06-09 RX ADMIN — THERA TABS 1 TABLET: TAB at 08:38

## 2017-06-09 RX ADMIN — ASPIRIN 81 MG: 81 TABLET, COATED ORAL at 08:38

## 2017-06-09 RX ADMIN — TEMAZEPAM 7.5 MG: 7.5 CAPSULE ORAL at 21:40

## 2017-06-09 RX ADMIN — CLINDAMYCIN HYDROCHLORIDE 300 MG: 300 CAPSULE ORAL at 21:33

## 2017-06-09 RX ADMIN — VORTIOXETINE 5 MG: 5 TABLET, FILM COATED ORAL at 13:29

## 2017-06-09 RX ADMIN — SODIUM CHLORIDE: 9 INJECTION, SOLUTION INTRAVENOUS at 21:40

## 2017-06-09 RX ADMIN — TRAZODONE HYDROCHLORIDE 100 MG: 100 TABLET ORAL at 21:33

## 2017-06-09 RX ADMIN — CEFAZOLIN SODIUM 1 G: 1 INJECTION, POWDER, FOR SOLUTION INTRAMUSCULAR; INTRAVENOUS at 08:26

## 2017-06-09 RX ADMIN — MIRTAZAPINE 45 MG: 15 TABLET, FILM COATED ORAL at 21:33

## 2017-06-09 RX ADMIN — CLINDAMYCIN HYDROCHLORIDE 300 MG: 300 CAPSULE ORAL at 13:30

## 2017-06-09 NOTE — PROGRESS NOTES
[facility]    Internal Medicine Hospitalist Progress Note  06/09/2017  I evaluated patient on the above date.    Nik Dejesus Jr., MD  [phone] (p)  Text Page (7 am to 6 pm)      Assessment & Plan Ms. Kavitha Hartman is a pleasant 71-year-old female with past medical history including HTN, DLD, mod AS, dep/anx, EtOH abuse, chronic back pain, recent LE cellulitis ond oral antibiotics, who presented 6/6 with worsening LE erythema and swelling.    BLE cellulitis vs worsening chronic venous stasis.  * Recently treated for cellulitis with abx including clindamycin, then switched to minocycline and Keflex (due to intolerance).  * On initial eval 6/6, pt afebrile, nl WBC. BLE US 6/6 negative. Started on IV vanco 6/6. Given a dose of IV Lasix 6/6.  * Improved 6/7. Procal 1.99 6/7. Seen by ID and abx changed to IV cefazolin 6/7.   * Micro: BC's 6/6 NGTD.  - D/C cefazolin (started 6/7).  - Start clindamycin to stop after 6/13/2017 (Keflex recommended by ID, but given possible affect on LFT's (possible side effect of cholestatic jaundice), favored another option - I d/w Dr. Arreguin 6/9).  - Keep BLE's elevated.  - Monitor cultures.  - Apprec help from Dr. Arreguin.  - Mgmt of LE edema as below.    Abnormal liver function tests, suspect from acute EtOH hepatitis, possible chronic liver disease, ?acute worsening from meds.  * On admission with with elevated , , total bilirubin 2.3. Statin held on admission.  * Abd US 6/7 showed no acute findings, +fattly liver infiltration.  * Overall, suspect related to her daily alcohol drinking.   * Some improvement in LFT's 6/7.  * Tbili up to 4.1 and ALP slightly worse, but AST and ALT better.   * Tbili 5.4 and  6/9, AST and ALT improved.  * Has been on cefazolin, which could affect LFT's.  - Ask GI to see, appreciate help (will ask Dr. Graham to see).  - Check viral hepatitis serologies.  - Stop cefazolin as above.  - Abstinence from EtOH recommended.  -  Holding PTA statin.    Worsened chronic LE edema.  * PTA on PRN Lasix 20 mg daily.   * No signs of pulm edema or other signs of CHF.   * Given IV Lasix 6/6 as above.   * Leg swelling improved 6/7. Echo 6/7 showed LVEF >70%, suggestion of impaired LV relaxation, mod AS, ascending aorta is mildly dilated.   - Continue leg elevation.    Acute kidney injury, stage 1.  * Cr normal on admission, but increased to 1.19 6/8.  * Initially felt to be prerenal.  * However, further worsening since 6/8 with worsened liver function. Cefazolin can also have potential side effect of nephrotoxicity.   Recent Labs  Lab 06/09/17  0745 06/08/17  0746 06/07/17  0720 06/06/17  2155 06/06/17  1457   CR 1.42* 1.19* 0.88 0.89 1.02   - Check UA, FENa.  - D/C cefazolin as above.  - Ask Nephrology to see, apprec help.  - Monitor BMP.  - Avoid nephrotoxic medications.    Hyponatremia, possibly nutrional or related to volume overload, ?due to liver disease.  * Na 129 on admission 6/6. Given Lasix 6/6.  * Hyponatremia workup labs not suggestive of SIADH, possibly due to CHF/volume overload.     Recent Labs  Lab 06/09/17  0745 06/08/17  0746 06/07/17  0720 06/06/17  1457   * 129* 130* 129*   - Monitor BMP.    Thrombocytopenia.  * Unclear etiology; possibly from liver disease, medication effect or chronic from other cause.   Recent Labs  Lab 06/09/17  0745 06/08/17  0746 06/07/17  0720 06/06/17  1457   * 143* 166 217   - Hold Lovenox.  - Monitor CBC.  - Consider platelet transfusion if needs a procedure and less than 50,000; or if less than 10,000.    EtOH abuse.  * She reported drinking 2-3 cocktails per night. She denied having history of alcohol withdrawal in the past.    * Her blood work here suggests chronic alcoholic use with hyponatremia, hypokalemia, and elevated liver function tests; also fatty liver on US. INR normal.  * More tremulous 6/8, started on CIWA 6/8.  * Stable 6/9.  - Start on CIWA protocol.  - Abstinence from EtOH  "recommended.    Hypokalemia, could be due nutritional or related to PRN Lasix at home.  - Continue K replacement protocol.    Moderate aortic stenosis.  * Her last echocardiogram was in 08/2015.  Follows with [facility].  * Echo 6/7 showed LVEF >70%, suggestion of impaired LV relaxation, mod AS, ascending aorta is mildly dilated.  - Monitor i/o's, daily wts.  - F/u Cardiologist for further monitoring.    Hypertension (benign essential).  [PTA: metoprolol XL 25 mg daily.]  - Continue metoprolol XL.    Dyslipidemia.  - Holding atorvastatin as above.    Depression/anxiety.   * Psychiatry consulted and started Trintellix and Seroquel 6/9.  - Hold on starting Seroquel due to rising LFT's.  - Continue Remeron and trazodone.   - Apprec help from Dr. Vargas.    Insomnia.  - Continue PRN temazepam.    Chronic back pain.  * On narcotic agreement.  - Continue gabapentin, PTA PRN Ultram 50 mg BID.    Prophylaxis.  - PCD's.  - D/C Lovenox given LYLY and plts decreasing.    CODE STATUS: FULL    Dispo.  - Pending above in particular await improvement/stability in hepatic and renal function.  - 2-3 more days.    I d/w pt's daughter 6/8.    Interval History   Doing OK overall. Tolerating diet. Legs better overall.    -Data reviewed today: I reviewed all new labs and imaging over the last 24 hours. I personally reviewed no images or EKG's today.    Physical Exam   Heart Rate: 86, Blood pressure 109/70, pulse 90, temperature 99  F (37.2  C), temperature source Oral, resp. rate 18, height 1.626 m (5' 4\"), weight 85.7 kg (188 lb 15 oz), SpO2 93 %, not currently breastfeeding.  Vitals:    06/08/17 0500 06/08/17 1700 06/09/17 0542   Weight: 86.7 kg (191 lb 2.2 oz) 85.6 kg (188 lb 11.4 oz) 85.7 kg (188 lb 15 oz)     Vital Signs with Ranges  Temp:  [97.4  F (36.3  C)-99  F (37.2  C)] 99  F (37.2  C)  Heart Rate:  [82-89] 86  Resp:  [16-18] 18  BP: (109-122)/(70-78) 109/70  SpO2:  [93 %-97 %] 93 %  Patient Vitals for the past 24 " hrs:   BP Temp Temp src Heart Rate Resp SpO2 Weight   06/09/17 0822 109/70 99  F (37.2  C) Oral 86 18 - -   06/09/17 0542 - - - - - - 85.7 kg (188 lb 15 oz)   06/09/17 0105 115/74 97.4  F (36.3  C) Oral 82 16 93 % -   06/08/17 1940 117/78 98.6  F (37  C) Oral 89 16 96 % -   06/08/17 1700 - - - - - - 85.6 kg (188 lb 11.4 oz)   06/08/17 1615 122/75 98.7  F (37.1  C) Oral 86 16 97 % -     I/O's Last 24 hours  I/O last 3 completed shifts:  In: 890 [P.O.:590; I.V.:300]  Out: 275 [Urine:275]    Constitutional: Jaundiced. Alert, oriented, pleasant, less tremulous.  Respiratory: Clear, no crackles/wheezes.  Cardiovascular: RRR, +I-II/VI sys m, no g/r.  GI: Soft, nt, +BS.  Skin/Integumen: BLE overall since yesterday.  Other:        Data     Recent Labs  Lab 06/09/17  0745 06/08/17  0746 06/07/17 2010 06/07/17  0720   WBC 7.4 6.3  --   --  6.2   HGB 12.1 11.1*  --   --  11.3*   * 100  --   --  99   * 143*  --   --  166   INR  --   --   --   --  1.02   * 129*  --   --  130*   POTASSIUM 4.4 4.3 3.7  < > 2.8*   CHLORIDE 84* 85*  --   --  85*   CO2 34* 37*  --   --  36*   BUN 26 22  --   --  18   CR 1.42* 1.19*  --   --  0.88   ANIONGAP 8 7  --   --  9   BIRGIT 8.5 8.3*  --   --  7.9*   GLC 88 64*  --   --  66*   ALBUMIN 2.4* 2.3*  --   --  2.2*   PROTTOTAL 5.6* 5.0*  --   --  5.0*   BILITOTAL 5.4* 4.1*  --   --  2.4*   ALKPHOS 219* 180*  --   --  173*   ALT 48 82*  --   --  115*   * 142*  --   --  212*   < > = values in this interval not displayed.  Recent Labs   Lab Test  06/09/17   0745  06/08/17   0746  06/07/17   0720  06/06/17   1457  05/26/17   1619   04/11/12   1214  04/11/12   0649   GLC  88  64*  66*  99  101*   < >   --    --    BGM   --    --    --    --    --    --   134*  112*    < > = values in this interval not displayed.         No results found for this or any previous visit (from the past 24 hour(s)).    Medications   All medications were reviewed.       vortioxetine  5 mg Oral Daily      QUEtiapine  50 mg Oral At Bedtime     ceFAZolin  1 g Intravenous Q8H     aspirin EC  81 mg Oral Daily     gabapentin (NEURONTIN) tablet 600 mg  600 mg Oral At Bedtime     gabapentin (NEURONTIN) capsule 300 mg  300 mg Oral Daily     metoprolol  25 mg Oral Daily     mirtazapine  45 mg Oral At Bedtime     multivitamin, therapeutic  1 tablet Oral Daily     traZODone  100 mg Oral At Bedtime     sodium chloride (PF)  3 mL Intracatheter Q8H     folic acid  1 mg Oral Daily     vitamin  B-1  50 mg Oral Daily

## 2017-06-09 NOTE — PHARMACY
Prescriber Notification Note    The pharmacist has communicated with this patient's provider regarding a concern or therapy recommendation.    Notified Person: Dr. Dejesus  Date/Time of Notification: 6/9/17 @ 1100  Interaction: sticky note  Concern/Recommendation: Pt's platelets drifting down quickly since admission.  Could lovenox be a problem?     Comments/Additional Details: MD d/c'erin lovenox.    Radha Holder, LedaD

## 2017-06-09 NOTE — PLAN OF CARE
Problem: Goal Outcome Summary  Goal: Goal Outcome Summary  Outcome: Declining  A and Ox4. Scored 4 and 4 CIWA. Worsening liver and kidney function, GI and Nephrology consults today. Ambulates to bathroom with assistance of 1, up in chair x1 with encouragement. IV antibiotics changed to oral.

## 2017-06-09 NOTE — PROGRESS NOTES
"[facility]  Infectious Disease Progress Note          Assessment and Plan:   IMPRESSION:   1.  Kavitha Headley is a 71-year-old female with history of morbid obesity.  Status post gastric bypass surgery.   2.  History of alcohol abuse.   3.  Chronic pain syndrome.   4.  Admitted with a diagnosis of bilateral lower leg cellulitis with failed outpatient antibiotic therapy.  I suspect that this more likely represents venous stasis disease of the legs rather than cellulitis given the bilateral nature of the redness, as well as absence of fever and leukocytosis.  5.  Sulfa allergy.       RECOMMENDATIONS:   1.  can switch to PO Keflex and home any time from ID standpoint.  Would give for 5 days.  2. Consider Lymphedema Clinic post discharge.        Interval History:   Legs feel better.  Afebrile.  Decreased leg erythema and swelling.  procalcitonin 1.99.              Medications:       ceFAZolin  1 g Intravenous Q8H     aspirin EC  81 mg Oral Daily     gabapentin (NEURONTIN) tablet 600 mg  600 mg Oral At Bedtime     gabapentin (NEURONTIN) capsule 300 mg  300 mg Oral Daily     metoprolol  25 mg Oral Daily     mirtazapine  45 mg Oral At Bedtime     multivitamin, therapeutic  1 tablet Oral Daily     traZODone  100 mg Oral At Bedtime     sodium chloride (PF)  3 mL Intracatheter Q8H     enoxaparin  40 mg Subcutaneous Q24H     folic acid  1 mg Oral Daily     vitamin  B-1  50 mg Oral Daily                  Physical Exam:   Blood pressure 115/74, pulse 90, temperature 97.4  F (36.3  C), temperature source Oral, resp. rate 16, height 1.626 m (5' 4\"), weight 85.7 kg (188 lb 15 oz), SpO2 93 %, not currently breastfeeding.  [unfilled]  Vital Signs with Ranges  Temp:  [97.4  F (36.3  C)-98.7  F (37.1  C)] 97.4  F (36.3  C)  Pulse:  [90] 90  Heart Rate:  [82-89] 82  Resp:  [16-18] 16  BP: (115-127)/(74-78) 115/74  SpO2:  [93 %-97 %] 93 %    Constitutional: Awake, alert, cooperative, no apparent distress   Lungs: " Clear to auscultation bilaterally, no crackles or wheezing   Cardiovascular: Regular rate and rhythm, normal S1 and S2, and no murmur noted   Abdomen: Normal bowel sounds, soft, non-distended, non-tender   Skin: No rashes, no cyanosis, no edema   Other: Leg erythema largely resolved.          Data:   All microbiology laboratory data reviewed.  Recent Labs   Lab Test  06/08/17   0746  06/07/17   0720  06/06/17   1457   WBC  6.3  6.2  6.3   HGB  11.1*  11.3*  12.9   HCT  30.2*  30.2*  34.8*   MCV  100  99  102*   PLT  143*  166  217     Recent Labs   Lab Test  06/08/17   0746  06/07/17   0720  06/06/17   2155   CR  1.19*  0.88  0.89     Recent Labs   Lab Test  04/23/12   1335   SED  34*

## 2017-06-09 NOTE — PLAN OF CARE
Problem: Goal Outcome Summary  Goal: Goal Outcome Summary  Outcome: No Change  Patient is alert and oriented.  Vitals stable.  Denies pain.  CIWA scores 2/0/5, ativan x 1. Up with assist of one.  Bilateral lower extremities red, areas marked, +1 +2 edema.  Continues on IV antibiotics.

## 2017-06-09 NOTE — PROVIDER NOTIFICATION
[facility] Note    Consult received. Pt was seen by Dr Moe Barlow in 2016  Called floor to contact his office for consultation. Discussed with Dr Liborio Wen, PA-C  [phone]

## 2017-06-09 NOTE — CONSULTS
"RENAL CONSULTATION NOTE    REFERRING MD:  Nik Dejesus MD    REASON FOR CONSULTATION:  LYLY    HPI:  71 y.o woman with mild CAD, hypertension, hyperlipidemia, obesity s/p gastric bypass, who was admitted on 8/7 for bilateral lower extremities edema. Pt is had ativan earlier, and she is a little groggy now. Her daughter is at the bedside. She says the lower extremities edema started about three weeks ago. She also had an \"angry\" red looking skin in the lower extremities. She was diagnosed with cellulitis of the legs, and her PCP started clindamycin, then minocycline then Keflex. Despite these antibiotics she was not getting better, so she came in for further evaluation. She was started on a couple dose so Keflex. Currently, she is on Clindamycin. Per Dr. Arreguin's note, he recommended Keflex.     Pt states she has not bee feeling well for three weeks. She describes it as generalized malaise. She denies fever and chill. She did not notice any unusual urinary symptoms prior to admission. She denies dysuria. She was taking 500 mg daily for pain. She says there were no new medications. She does not have any cardiopulmonary complaints.     She was admitted on 6/7 with a Scr of 1 mg/dl, which is around her baseline. It increase to 1.19 mg/dl on 6/8 and 1.4 mg daily today. She noticed she is making less urine.     ROS:  A complete review of systems was performed and is negative except as noted above.    PMH:    Past Medical History:   Diagnosis Date     Alcohol abuse     long term alcohol abuse     Anxiety disorder      Bariatric surgery status 1996?    gastric bypass, [facility] and     Benign hypertension      Chronic insomnia      Chronic low back pain      Chronic pain syndrome     Chronic back and neck pain, chronic pain due to osteoarthritis multiple joints     Coronary artery disease 9/2015    mild distal branch disease on cath     Disc disease, degenerative, cervical     C4-C7 disc disease     Diverticulitis  "    inpatient therapy 6/2006     Hip joint replacement status 4/2004    right     Knee joint replacement status 12/2005    left     Macrocytic anemia     Mild macrocytic anemia, 2012 to present, likely based on alcohol abuse.     Major depressive disorder, single episode, severe, without mention of psychotic behavior      Mixed hyperlipidemia      Moderate aortic stenosis 5/2014    mild/mod AS with peak gradient 33/mean gradient 20 mmHg, AV area 1.2     Pelvic relaxation disorder     Surgical intervention for cystocele/rectocele 3,11/2012     Personal history of urinary calculi 6/2006    left ureteral stone,lithotripsy     PVC (premature ventricular contraction)      Stage III chronic kidney disease 2005     SVT (supraventricular tachycardia) (H)     likely atrial tachycardia       PSH:    Past Surgical History:   Procedure Laterality Date     APPENDECTOMY  3/2004    incidental     C GASTRIC BYPASS,OBESE<100CM ARIANNA-EN-Y  1996     C MEDIASTINOSCOPY W OR WO BIOPSY  2/2008    Videomediastinoscopy and, for mediastinal adenopathy -reactive lymphoid hyperplasia     C REPAIR OF RECTOCELE  3/2012     C TOTAL KNEE ARTHROPLASTY  12/2005    left      CARPAL TUNNEL RELEASE RT/LT  10/2010    Carpometacarpal excisional arthroplasty with a fascial autograft and APL suspension sling (26229). 2. Left thumb metacarpophalangeal joint fusion with autologous bone graft (10667). 3. Left endoscopic carpal tunnel release      CHOLECYSTECTOMY, LAPOROSCOPIC  11/2010    Cholecystectomy, Laparoscopic     COLONOSCOPY N/A 9/8/2016    Procedure: COMBINED COLONOSCOPY, SINGLE OR MULTIPLE BIOPSY/POLYPECTOMY BY BIOPSY;  Surgeon: Moe Barlow MD;  Location:  GI     CYSTOCELE REPAIR  11/2012    davinc laparoscopic sacrocolpopexy, enterocele repair, lysis of adhesions, placement of retropubic mid urethral sling, cystoscopy     CYSTOSCOPY, LITHOTRIPSY, COMBINED  6/2006    Left extracorporeal shock wave lithotripsy, cystoscopy, left ureteral stent  placement.     CYSTOSCOPY, REMOVE STENT(S), COMBINED  7/2006    Cystoscopy, removal of left ureteral stent, retrograde pyelography, flexible and rigid ureteroscopy and holmium laser lithotripsy, basket removal of stone fragments, ureteral stent placement.      HERNIA REPAIR  4/2012    bilateral augmentation mastopexy, ventral hernia repair, and medial thigh liposuction on 04/06/2012.      HYSTERECTOMY VAGINAL, BILATERAL SALPINGO-OOPHERECTOMY, COMBINED  1998    due to myoma and bleeding     JOINT REPLACEMENT, HIP RT/LT  4/2004    right total hip arthroplasty     LAPAROTOMY, LYSIS ADHESIONS, COMBINED  3/2004    lysis adhesions, ventral hernia repair, appendectomy incidentally     LYMPH NODE BIOPSY  4/2008    right axillary, reactive follicular and paracortical hyperplasia.     MAMMOPLASTY AUGMENTATION BILATERAL  4/2012     REVISE RECONSTRUCTED BREAST  6/7/2012    Left breast capsulotomy.        MEDICATIONS:      vortioxetine  5 mg Oral Daily     clindamycin  300 mg Oral Q8H AMOR     aspirin EC  81 mg Oral Daily     gabapentin (NEURONTIN) tablet 600 mg  600 mg Oral At Bedtime     gabapentin (NEURONTIN) capsule 300 mg  300 mg Oral Daily     metoprolol  25 mg Oral Daily     mirtazapine  45 mg Oral At Bedtime     multivitamin, therapeutic  1 tablet Oral Daily     traZODone  100 mg Oral At Bedtime     sodium chloride (PF)  3 mL Intracatheter Q8H     folic acid  1 mg Oral Daily     vitamin  B-1  50 mg Oral Daily       ALLERGIES:    Allergies as of 06/06/2017 - Review Complete 06/06/2017   Allergen Reaction Noted     Bactrim [sulfamethoxazole w/trimethoprim] Hives 10/19/2015     Codeine Itching 12/03/2003     Morphine Itching 12/03/2003       FH:    Family History   Problem Relation Age of Onset     Substance Abuse Father      CANCER Father      throat and lung mets     DIABETES No family hx of      Coronary Artery Disease No family hx of      CEREBROVASCULAR DISEASE No family hx of        SH:    Social History     Social  "History     Marital status:      Spouse name: Mt     Number of children: 4     Years of education: 18     Occupational History     nurse       [employer]     Social History Main Topics     Smoking status: Never Smoker     Smokeless tobacco: Never Used     Alcohol use 0.0 oz/week     0 Standard drinks or equivalent per week      Comment: 2-3 drinks per day     Drug use: No     Sexual activity: Yes     Partners: Male     Other Topics Concern     Blood Transfusions No     Caffeine Concern Yes     1-2 cups per day      Occupational Exposure Yes     blood     Hobby Hazards No     Sleep Concern Yes     Stress Concern Yes     Weight Concern Yes     gastric  byepass     Special Diet No     Back Care No     Exercise Yes     walk, swin     Bike Helmet No     Seat Belt Yes     Self-Exams Yes     Social History Narrative       PHYSICAL EXAM:    /70 (BP Location: Left arm)  Pulse 90  Temp 99  F (37.2  C) (Oral)  Resp 18  Ht 1.626 m (5' 4\")  Wt 85.7 kg (188 lb 15 oz)  SpO2 93%  BMI 32.43 kg/m2  GENERAL: NAD. Groggy.  HEENT:  Normocephalic. No gross abnormalities.  Pupils equal. Lips are dry.  CV: RRR, + AS murmur, no clicks, gallops, or rubs, 1+ edema, no carotid bruits  RESP: Poor airflow. No wheezes or crackles  GI: Abdomen obese, soft, NT  MUSCULOSKELETAL: 1+ bilateral LE edema. Redness seems better.  SKIN: Erythema improved  NEURO:  A?O x 3. Groggy.  PSYCH: mood good, affect appropriate  LYMPH: No palpable ant/post cervical    LABS:      CBC RESULTS:     Recent Labs  Lab 06/09/17  0745 06/08/17  0746 06/07/17  0720 06/06/17  1457   WBC 7.4 6.3 6.2 6.3   RBC 3.31* 3.01* 3.06* 3.43*   HGB 12.1 11.1* 11.3* 12.9   HCT 33.6* 30.2* 30.2* 34.8*   * 143* 166 217       BMP RESULTS:    Recent Labs  Lab 06/09/17  0745 06/08/17  0746 06/07/17 2010 06/07/17  1720 06/07/17  0720 06/06/17  2155 06/06/17  1457   * 129*  --   --  130*  --  129*   POTASSIUM 4.4 4.3 3.7 Canceled, Test credited Specimen Lost, " Testing unable to be completedDIEGO BREAUX RN IN 66 NOTIFIED @ 2005 FK 2.8* 3.1* 2.5*   CHLORIDE 84* 85*  --   --  85*  --  80*   CO2 34* 37*  --   --  36*  --  31   BUN 26 22  --   --  18  --  20   CR 1.42* 1.19*  --   --  0.88 0.89 1.02   GLC 88 64*  --   --  66*  --  99   BIRGIT 8.5 8.3*  --   --  7.9*  --  8.8       INR  Recent Labs  Lab 06/07/17  0720   INR 1.02        DIAGNOSTICS:  Reviewed    LE ultrasound: no DVT  TTE: Mod AS. Grade DD, normal EF    A/P:  71 y.o woman with CAD, hypertension, hyperlipidemia and obesity, admitted for bilateral LE cellulitis, consulted for LYLY.    1. Pt was admitted  On 6/7 with a serum creatinine of 1 mg/dl    2. LYLY. Presumed prerenal. Pt seems dry. She is not making much urine.     3. Bilateral cellulitis. On clindamycin. ID recommended Keflex    4. 1+ peripheral edema. TTE with diastolic dysfunction with preserved EF. Presumed from cellulitis for now.    5. Hyponatremia. This could be from psych medications, but I think she is dry.     6. Abnormal liver enzymes and TB?    Plan.   1. Check UA with micr, FENa, UPCR  2. Check dedicate renal ultrasound if kidney function continues to decline  3. Start NS at 125 cc x 1-2 liters  4. Decrease Toprol XL to 12.5 mg daily  5. Avoid NSAIDs  6. Avoid diuretic for now    Tesfaye العراقي MD  [facility] - Nephrology  Office Phone: [phone]  Pager: [phone]

## 2017-06-10 ENCOUNTER — APPOINTMENT (OUTPATIENT)
Dept: PHYSICAL THERAPY | Facility: CLINIC | Age: 72
DRG: 432 | End: 2017-06-10
Attending: HOSPITALIST
Payer: MEDICARE

## 2017-06-10 LAB
ALBUMIN SERPL-MCNC: 2 G/DL (ref 3.4–5)
ALP SERPL-CCNC: 215 U/L (ref 40–150)
ALT SERPL W P-5'-P-CCNC: 20 U/L (ref 0–50)
AMMONIA PLAS-SCNC: 15 UMOL/L (ref 10–50)
ANION GAP SERPL CALCULATED.3IONS-SCNC: 8 MMOL/L (ref 3–14)
AST SERPL W P-5'-P-CCNC: 66 U/L (ref 0–45)
BASOPHILS # BLD AUTO: 0.1 10E9/L (ref 0–0.2)
BASOPHILS NFR BLD AUTO: 0.8 %
BILIRUB DIRECT SERPL-MCNC: 3.5 MG/DL (ref 0–0.2)
BILIRUB SERPL-MCNC: 4.2 MG/DL (ref 0.2–1.3)
BUN SERPL-MCNC: 20 MG/DL (ref 7–30)
CALCIUM SERPL-MCNC: 7.8 MG/DL (ref 8.5–10.1)
CHLORIDE SERPL-SCNC: 92 MMOL/L (ref 94–109)
CO2 SERPL-SCNC: 31 MMOL/L (ref 20–32)
CREAT SERPL-MCNC: 1.04 MG/DL (ref 0.52–1.04)
DIFFERENTIAL METHOD BLD: ABNORMAL
EOSINOPHIL # BLD AUTO: 0.4 10E9/L (ref 0–0.7)
EOSINOPHIL NFR BLD AUTO: 5 %
ERYTHROCYTE [DISTWIDTH] IN BLOOD BY AUTOMATED COUNT: 14.8 % (ref 10–15)
GFR SERPL CREATININE-BSD FRML MDRD: 52 ML/MIN/1.7M2
GLUCOSE SERPL-MCNC: 75 MG/DL (ref 70–99)
HCT VFR BLD AUTO: 30.8 % (ref 35–47)
HGB BLD-MCNC: 11.3 G/DL (ref 11.7–15.7)
IMM GRANULOCYTES # BLD: 0 10E9/L (ref 0–0.4)
IMM GRANULOCYTES NFR BLD: 0.1 %
INR PPP: 1.19 (ref 0.86–1.14)
LYMPHOCYTES # BLD AUTO: 1.1 10E9/L (ref 0.8–5.3)
LYMPHOCYTES NFR BLD AUTO: 14.7 %
MCH RBC QN AUTO: 36.7 PG (ref 26.5–33)
MCHC RBC AUTO-ENTMCNC: 36.7 G/DL (ref 31.5–36.5)
MCV RBC AUTO: 100 FL (ref 78–100)
MONOCYTES # BLD AUTO: 0.7 10E9/L (ref 0–1.3)
MONOCYTES NFR BLD AUTO: 9 %
NEUTROPHILS # BLD AUTO: 5.1 10E9/L (ref 1.6–8.3)
NEUTROPHILS NFR BLD AUTO: 70.4 %
NRBC # BLD AUTO: 0 10*3/UL
NRBC BLD AUTO-RTO: 0 /100
PLATELET # BLD AUTO: 77 10E9/L (ref 150–450)
POTASSIUM SERPL-SCNC: 3.8 MMOL/L (ref 3.4–5.3)
PROT SERPL-MCNC: 5 G/DL (ref 6.8–8.8)
RBC # BLD AUTO: 3.08 10E12/L (ref 3.8–5.2)
SODIUM SERPL-SCNC: 131 MMOL/L (ref 133–144)
WBC # BLD AUTO: 7.2 10E9/L (ref 4–11)

## 2017-06-10 PROCEDURE — A9270 NON-COVERED ITEM OR SERVICE: HCPCS | Mod: GY | Performed by: INTERNAL MEDICINE

## 2017-06-10 PROCEDURE — 97110 THERAPEUTIC EXERCISES: CPT | Mod: GP | Performed by: PHYSICAL THERAPIST

## 2017-06-10 PROCEDURE — 97161 PT EVAL LOW COMPLEX 20 MIN: CPT | Mod: GP | Performed by: PHYSICAL THERAPIST

## 2017-06-10 PROCEDURE — 25000132 ZZH RX MED GY IP 250 OP 250 PS 637: Mod: GY | Performed by: INTERNAL MEDICINE

## 2017-06-10 PROCEDURE — A9270 NON-COVERED ITEM OR SERVICE: HCPCS | Mod: GY | Performed by: HOSPITALIST

## 2017-06-10 PROCEDURE — 86038 ANTINUCLEAR ANTIBODIES: CPT | Performed by: INTERNAL MEDICINE

## 2017-06-10 PROCEDURE — 83516 IMMUNOASSAY NONANTIBODY: CPT | Performed by: INTERNAL MEDICINE

## 2017-06-10 PROCEDURE — 97530 THERAPEUTIC ACTIVITIES: CPT | Mod: GP | Performed by: PHYSICAL THERAPIST

## 2017-06-10 PROCEDURE — A9270 NON-COVERED ITEM OR SERVICE: HCPCS | Mod: GY | Performed by: PSYCHIATRY & NEUROLOGY

## 2017-06-10 PROCEDURE — 12000000 ZZH R&B MED SURG/OB

## 2017-06-10 PROCEDURE — 97116 GAIT TRAINING THERAPY: CPT | Mod: GP | Performed by: PHYSICAL THERAPIST

## 2017-06-10 PROCEDURE — 85610 PROTHROMBIN TIME: CPT | Performed by: INTERNAL MEDICINE

## 2017-06-10 PROCEDURE — 82140 ASSAY OF AMMONIA: CPT | Performed by: INTERNAL MEDICINE

## 2017-06-10 PROCEDURE — 99232 SBSQ HOSP IP/OBS MODERATE 35: CPT | Performed by: HOSPITALIST

## 2017-06-10 PROCEDURE — 25000132 ZZH RX MED GY IP 250 OP 250 PS 637: Mod: GY | Performed by: HOSPITALIST

## 2017-06-10 PROCEDURE — 25000125 ZZHC RX 250: Performed by: INTERNAL MEDICINE

## 2017-06-10 PROCEDURE — 82248 BILIRUBIN DIRECT: CPT | Performed by: INTERNAL MEDICINE

## 2017-06-10 PROCEDURE — 40000193 ZZH STATISTIC PT WARD VISIT: Performed by: PHYSICAL THERAPIST

## 2017-06-10 PROCEDURE — 80053 COMPREHEN METABOLIC PANEL: CPT | Performed by: INTERNAL MEDICINE

## 2017-06-10 PROCEDURE — 36415 COLL VENOUS BLD VENIPUNCTURE: CPT | Performed by: INTERNAL MEDICINE

## 2017-06-10 PROCEDURE — 25000132 ZZH RX MED GY IP 250 OP 250 PS 637: Mod: GY | Performed by: PSYCHIATRY & NEUROLOGY

## 2017-06-10 PROCEDURE — 85025 COMPLETE CBC W/AUTO DIFF WBC: CPT | Performed by: INTERNAL MEDICINE

## 2017-06-10 RX ORDER — LANOLIN ALCOHOL/MO/W.PET/CERES
100 CREAM (GRAM) TOPICAL DAILY
Status: DISCONTINUED | OUTPATIENT
Start: 2017-06-10 | End: 2017-06-16 | Stop reason: HOSPADM

## 2017-06-10 RX ORDER — POTASSIUM CHLORIDE 1.5 G/1.58G
20 POWDER, FOR SOLUTION ORAL 2 TIMES DAILY
Status: COMPLETED | OUTPATIENT
Start: 2017-06-10 | End: 2017-06-12

## 2017-06-10 RX ORDER — SODIUM CHLORIDE AND POTASSIUM CHLORIDE 150; 900 MG/100ML; MG/100ML
INJECTION, SOLUTION INTRAVENOUS CONTINUOUS
Status: DISCONTINUED | OUTPATIENT
Start: 2017-06-10 | End: 2017-06-11

## 2017-06-10 RX ORDER — MAGNESIUM OXIDE 400 MG/1
400 TABLET ORAL DAILY
Status: DISCONTINUED | OUTPATIENT
Start: 2017-06-10 | End: 2017-06-16 | Stop reason: HOSPADM

## 2017-06-10 RX ADMIN — THERA TABS 1 TABLET: TAB at 09:11

## 2017-06-10 RX ADMIN — CLINDAMYCIN HYDROCHLORIDE 300 MG: 300 CAPSULE ORAL at 13:17

## 2017-06-10 RX ADMIN — GABAPENTIN 300 MG: 300 CAPSULE ORAL at 09:10

## 2017-06-10 RX ADMIN — THIAMINE HCL (VITAMIN B1) 50 MG TABLET 50 MG: at 09:10

## 2017-06-10 RX ADMIN — OMEPRAZOLE 20 MG: 20 CAPSULE, DELAYED RELEASE ORAL at 10:50

## 2017-06-10 RX ADMIN — GABAPENTIN 600 MG: 600 TABLET, FILM COATED ORAL at 22:10

## 2017-06-10 RX ADMIN — Medication 100 MG: at 16:35

## 2017-06-10 RX ADMIN — POTASSIUM CHLORIDE AND SODIUM CHLORIDE: 900; 150 INJECTION, SOLUTION INTRAVENOUS at 16:35

## 2017-06-10 RX ADMIN — MIRTAZAPINE 45 MG: 15 TABLET, FILM COATED ORAL at 22:10

## 2017-06-10 RX ADMIN — MAGNESIUM OXIDE TAB 400 MG (241.3 MG ELEMENTAL MG) 400 MG: 400 (241.3 MG) TAB at 16:35

## 2017-06-10 RX ADMIN — Medication 12.5 MG: at 09:10

## 2017-06-10 RX ADMIN — VORTIOXETINE 5 MG: 5 TABLET, FILM COATED ORAL at 09:10

## 2017-06-10 RX ADMIN — ASPIRIN 81 MG: 81 TABLET, COATED ORAL at 09:10

## 2017-06-10 RX ADMIN — POTASSIUM CHLORIDE 20 MEQ: 1.5 POWDER, FOR SOLUTION ORAL at 22:09

## 2017-06-10 RX ADMIN — TEMAZEPAM 7.5 MG: 7.5 CAPSULE ORAL at 23:15

## 2017-06-10 RX ADMIN — TRAZODONE HYDROCHLORIDE 100 MG: 100 TABLET ORAL at 22:10

## 2017-06-10 RX ADMIN — CLINDAMYCIN HYDROCHLORIDE 300 MG: 300 CAPSULE ORAL at 05:59

## 2017-06-10 RX ADMIN — FOLIC ACID 1 MG: 1 TABLET ORAL at 09:10

## 2017-06-10 RX ADMIN — CLINDAMYCIN HYDROCHLORIDE 300 MG: 300 CAPSULE ORAL at 22:10

## 2017-06-10 NOTE — PROGRESS NOTES
[facility]    Internal Medicine Hospitalist Progress Note  06/10/2017  I evaluated patient on the above date.    Assessment & Plan Ms. Kavitha Hartman is a pleasant 71-year-old female with past medical history including HTN, DLD, mod AS, dep/anx, EtOH abuse, chronic back pain, recent LE cellulitis ond oral antibiotics, who presented 6/6 with worsening LE erythema and swelling.    BLE cellulitis vs worsening chronic venous stasis:  Recently treated for cellulitis with abx including clindamycin, then switched to minocycline and Keflex (due to intolerance).  - On initial eval 6/6, pt afebrile, nl WBC. BLE US 6/6 negative. Started on IV vanco 6/6. Given a dose of IV Lasix 6/6.  - Improved 6/7. Procal 1.99 6/7. Seen by ID and abx changed to IV cefazolin 6/7 which was stopped 6/9  - Started clindamycin to stop after 6/13/2017 (Keflex recommended by ID, but given possible affect on LFT's (possible side effect of cholestatic jaundice), favored another option - was discussed with Dr. Arreguin 6/9).  - Keep BLE's elevated.  - Monitor cultures, NGTD).  - Apprec help from Dr. Arreguin.  - Mgmt of LE edema as below.    Abnormal liver function tests, suspect from acute EtOH hepatitis, possible chronic liver disease, ?acute worsening from meds.  * On admission with with elevated , , total bilirubin 2.3. Statin held on admission.  * Abd US 6/7 showed no acute findings, + fattly liver infiltration.  * Overall, suspect related to her daily alcohol drinking.   * Has been on cefazolin, which could affect LFT's.  - Evaluated by Dr. Graham, hepatitis panel negative, autoimmune serologies pending,   - Stop cefazolin as above.  - Abstinence from EtOH recommended.  - Holding PTA statin.  - LFTs slightly improved, continue to follow    Worsened chronic LE edema: PTA on PRN Lasix 20 mg daily. No signs of pulm edema or other signs of CHF.   * Given IV Lasix 6/6 as above.   * Leg swelling improved 6/7. Echo 6/7 showed  LVEF >70%, suggestion of impaired LV relaxation, mod AS, ascending aorta is mildly dilated.   - Given worsened Cr and hyponatremia, Lasix held.  - Continue leg elevation.    Acute kidney injury, stage 1 and   * Cr normal on admission, baseline 0.8-1, but increased upto 1.4  * Initially felt to be prerenal.  - Nephrology consulted, received 2L NS starting noon 6/9, Cr down to 1.04 today  - Monitor BMP.  - Avoid nephrotoxic medications.    Hyponatremia, possibly nutrional or related to volume overload, ?due to liver disease.  - Na 129 on admission 6/6. Given Lasix 6/6.  - Hyponatremia workup labs not suggestive of SIADH, no overt CHF/volume overload  - tolerated IV fluid as above, and sodium 131 today  - appreciate nephrology assistance.    Thrombocytopenia.  * Unclear etiology; possibly from liver disease- though normal at presentatino, medication effect or from other cause.     Recent Labs  Lab 06/10/17  0823 06/09/17  0745 06/08/17  0746 06/07/17  0720 06/06/17  1457   PLT Pending 118* 143* 166 217   - Holding Lovenox.  - Monitor CBC.  - Consider platelet transfusion if needs a procedure and less than 50,000; or if less than 10,000.  - if drops further, will get HIT panel, pending today am platelet count.    EtOH abuse.  * She reported drinking 2-3 cocktails per night. She denied having history of alcohol withdrawal in the past.    * Her blood work here suggests chronic alcoholic use with hyponatremia, hypokalemia, and elevated liver function tests; also fatty liver on US. INR normal.  * More tremulous 6/8, started on CIWA 6/8.  * Stable 6/9 and 6/10 though tremulous and unsteady.  - On CIWA protocol.  - Abstinence from EtOH recommended.    Hypokalemia, could be due nutritional or related to PRN Lasix at home.  - on K replacement protocol.    Moderate aortic stenosis.  * Her last echocardiogram was in 08/2015.  Follows with [facility].  * Echo 6/7 showed LVEF >70%, suggestion of impaired LV relaxation, mod AS,  "ascending aorta is mildly dilated.  - Monitor i/o's, daily wts.  - F/u Cardiologist for further monitoring.    Hypertension (benign essential).  [PTA: metoprolol XL 25 mg daily.]  - Continue metoprolol XL.    Dyslipidemia.  - Holding atorvastatin as above.    Depression/anxiety.   * Psychiatry consulted and started Trintellix and Seroquel 6/9.  - Hold on starting Seroquel due to rising LFT's.  - Continue Remeron and trazodone.   - Apprec help from Dr. Vargas    Insomnia.  - Continue PRN temazepam.    Chronic back pain.  * On narcotic agreement.  - Continue gabapentin, PTA PRN Ultram 50 mg BID.    Prophylaxis.  - PCD's. lovenox stopped due to thrombocytopenia  -add PPI given heart burn symptoms    CODE STATUS: FULL    Dispo.  - Pending above in particular await improvement/stability in hepatic and renal function.  - 1-2 days, PT eval prior to dc.  I d/w pt's daughter 6/10.    Interval History   Noticed to be unsteady by RN. Patient denies abdominal pain, nausea.  Daughter at bedside feels leg erythema and swelling has much improved. Patient denies leg pain.  Afebrile.  C/o epigastric discomfort and reflux.    -Data reviewed today: I reviewed all new labs over the last 24 hours. I personally reviewed no images or EKG's today.    Physical Exam   Heart Rate: 86, Blood pressure 111/76, pulse 90, temperature 98  F (36.7  C), temperature source Oral, resp. rate 18, height 1.626 m (5' 4\"), weight 86.2 kg (190 lb 0.6 oz), SpO2 97 %, not currently breastfeeding.  Vitals:    06/08/17 1700 06/09/17 0542 06/10/17 0617   Weight: 85.6 kg (188 lb 11.4 oz) 85.7 kg (188 lb 15 oz) 86.2 kg (190 lb 0.6 oz)     Vital Signs with Ranges  Temp:  [97.9  F (36.6  C)-98.5  F (36.9  C)] 98  F (36.7  C)  Heart Rate:  [81-92] 86  Resp:  [18] 18  BP: (110-120)/(70-82) 111/76  SpO2:  [93 %-97 %] 97 %  Patient Vitals for the past 24 hrs:   BP Temp Temp src Heart Rate Resp SpO2 Weight   06/10/17 0729 111/76 98  F (36.7  C) Oral 86 18 97 % - "   06/10/17 0617 - - - - - - 86.2 kg (190 lb 0.6 oz)   06/09/17 2123 120/82 97.9  F (36.6  C) Oral 92 18 93 % -   06/09/17 1602 110/70 98.5  F (36.9  C) Oral 81 18 96 % -     I/O's Last 24 hours  I/O last 3 completed shifts:  In: 480 [P.O.:480]  Out: 785 [Urine:785]    Constitutional: Jaundiced. Alert, oriented, pleasant, mildly tremulous with attempted activity.  HEENT: PERRLA, EOMI  Respiratory: Clear, no crackles/wheezes. On room air.  Cardiovascular: RRR, +I-II/VI sys murmur, no tachycardia  GI: Soft, non tender, BS (+)  Skin/Integumen: BLE edema noted, no redness noted.    Neuro: AAOx3, follows verbal commands appropriately. CN 2-12 intact.       Data     Recent Labs  Lab 06/10/17  0823 06/09/17  0745 06/08/17  0746  06/07/17  0720   WBC 7.2 7.4 6.3  --  6.2   HGB 11.3* 12.1 11.1*  --  11.3*    102* 100  --  99   PLT Pending 118* 143*  --  166   INR 1.19*  --   --   --  1.02   * 126* 129*  --  130*   POTASSIUM 3.8 4.4 4.3  < > 2.8*   CHLORIDE 92* 84* 85*  --  85*   CO2 31 34* 37*  --  36*   BUN 20 26 22  --  18   CR 1.04 1.42* 1.19*  --  0.88   ANIONGAP 8 8 7  --  9   BIRGIT 7.8* 8.5 8.3*  --  7.9*   GLC 75 88 64*  --  66*   ALBUMIN 2.0* 2.4* 2.3*  --  2.2*   PROTTOTAL 5.0* 5.6* 5.0*  --  5.0*   BILITOTAL 4.2* 5.4* 4.1*  --  2.4*   ALKPHOS 215* 219* 180*  --  173*   ALT 20 48 82*  --  115*   AST 66* 110* 142*  --  212*   < > = values in this interval not displayed.  Recent Labs   Lab Test  06/10/17   0823  06/09/17   0745  06/08/17   0746  06/07/17   0720  06/06/17   1457   04/11/12   1214  04/11/12   0649   GLC  75  88  64*  66*  99   < >   --    --    BGM   --    --    --    --    --    --   134*  112*    < > = values in this interval not displayed.         No results found for this or any previous visit (from the past 24 hour(s)).    Medications   All medications were reviewed.    NaCl Stopped (06/10/17 0746)       vortioxetine  5 mg Oral Daily     clindamycin  300 mg Oral Q8H AMOR     metoprolol   12.5 mg Oral Daily     aspirin EC  81 mg Oral Daily     gabapentin (NEURONTIN) tablet 600 mg  600 mg Oral At Bedtime     gabapentin (NEURONTIN) capsule 300 mg  300 mg Oral Daily     mirtazapine  45 mg Oral At Bedtime     multivitamin, therapeutic  1 tablet Oral Daily     traZODone  100 mg Oral At Bedtime     sodium chloride (PF)  3 mL Intracatheter Q8H     folic acid  1 mg Oral Daily     vitamin  B-1  50 mg Oral Daily

## 2017-06-10 NOTE — PLAN OF CARE
Problem: Goal Outcome Summary  Goal: Goal Outcome Summary  Outcome: No Change  A&Ox4.  VSS, on RA.  Denies pain.  Up w/ asst 1.  BLE red(redness marked), 2+ edema, elevated on pillows.  Oral clindamycin.  CIWAs minimal, no Ativan given.  GI and nephrology following.  D/C pending, nursing will continue to monitor.

## 2017-06-10 NOTE — PROGRESS NOTES
06/10/17 1000   Quick Adds   Type of Visit Initial PT Evaluation   Living Environment   Lives With significant other;alone  (alone during week; significant other there on weekends)   Living Arrangements house  ([facility])   Home Accessibility stairs to enter home   Number of Stairs to Enter Home 1   Number of Stairs Within Home 0   Transportation Available car;family or friend will provide   Self-Care   Usual Activity Tolerance excellent   Current Activity Tolerance poor   Regular Exercise yes   Activity/Exercise Type swimming   Exercise Amount/Frequency 2 times/wk   Equipment Currently Used at Home none  (walker, cane, walking stick; 4WW)   Activity/Exercise/Self-Care Comment patient normally independent and active   Functional Level Prior   Ambulation 0-->independent   Transferring 0-->independent   Toileting 0-->independent   Bathing 0-->independent   Dressing 0-->independent   Eating 0-->independent   Communication 0-->understands/communicates without difficulty   Swallowing 0-->swallows foods/liquids without difficulty   Cognition 0 - no cognition issues reported   Fall history within last six months yes   Number of times patient has fallen within last six months 3  (falls have occurred in bathroom; no grab bars)   Which of the above functional risks had a recent onset or change? ambulation;transferring;fall history;cognition   Prior Functional Level Comment Patient normally independent with mobility without a device; currently weaker than baseline and more unsteady   General Information   Onset of Illness/Injury or Date of Surgery - Date 06/06/17   Referring Physician Lina Caldera MD   Patient/Family Goals Statement return home or to family members home if needed   Pertinent History of Current Problem (include personal factors and/or comorbidities that impact the POC) Ms. Kavitha Hartman is a pleasant 71-year-old female with past medical history including HTN, DLD, mod AS, dep/anx, EtOH abuse, chronic back  pain, recent LE cellulitis ond oral antibiotics, who presented 6/6 with worsening LE erythema and swelling.; Abnormal liver function tests from suspected ETOH hepatitis; see medical record for further information   Precautions/Limitations fall precautions   General Observations patient sleepy; dizzy but not progressive; dizzy with turns   General Info Comments /72; HR 89; O2 sats 92%   Cognitive Status Examination   Orientation orientation to person, place and time   Level of Consciousness lethargic/somnolent;alert   Follows Commands and Answers Questions 100% of the time   Personal Safety and Judgment impaired;at risk behaviors demonstrated   Memory intact  (during eval)   Pain Assessment   Patient Currently in Pain No   Integumentary/Edema   Integumentary/Edema Comments edema in LE's; improving per patient report; cellulitis   Posture    Posture Comments WFL   Range of Motion (ROM)   ROM Comment WFL   Strength   Strength Comments functional weakness LE's > UE's; below baseline   Bed Mobility   Bed Mobility Comments SBA and set up assist   Transfer Skills   Transfer Comments sit<>stand with CGA, safety cues and use of a rolling walker; transfer to and from chair with CGA and safety cues   Gait   Gait Comments Gait x 50 feet with a rolling walker and CGA; slow gait speed; unsteady   Balance   Balance Comments impaired; 3 recent falls; current need for a walker   Sensory Examination   Sensory Perception Comments intact per patient   General Therapy Interventions   Planned Therapy Interventions gait training;strengthening;transfer training;progressive activity/exercise;bed mobility training   Clinical Impression   Criteria for Skilled Therapeutic Intervention yes, treatment indicated   PT Diagnosis impaired gait/transfers; weakness   Influenced by the following impairments weakness; decreased activity tolerance, dizziness; low Hgb; impaired balance   Functional limitations due to impairments impaired  "independence with functional mobility   Clinical Presentation Evolving/Changing   Clinical Presentation Rationale CGA for mobility; lives alone; supportive family; ETOH abuse   Clinical Decision Making (Complexity) Low complexity   Therapy Frequency` daily   Predicted Duration of Therapy Intervention (days/wks) 4 days   Anticipated Equipment Needs at Discharge front wheeled walker   Anticipated Discharge Disposition Transitional Care Facility;Home with Home Therapy;Home with Assist  (pending progress)   Risk & Benefits of therapy have been explained Yes   Patient, Family & other staff in agreement with plan of care Yes   [facility] TM \"6 Clicks\"   2016, Trustees of [facility], under license to [employer].  All rights reserved.   6 Clicks Short Forms Basic Mobility Inpatient Short Form   [facility]  \"6 Clicks\" V.2 Basic Mobility Inpatient Short Form   1. Turning from your back to your side while in a flat bed without using bedrails? 4 - None   2. Moving from lying on your back to sitting on the side of a flat bed without using bedrails? 3 - A Little   3. Moving to and from a bed to a chair (including a wheelchair)? 3 - A Little   4. Standing up from a chair using your arms (e.g., wheelchair, or bedside chair)? 3 - A Little   5. To walk in hospital room? 3 - A Little   6. Climbing 3-5 steps with a railing? 3 - A Little   Basic Mobility Raw Score (Score out of 24.Lower scores equate to lower levels of function) 19   Total Evaluation Time   Total Evaluation Time (Minutes) 15     "

## 2017-06-10 NOTE — PLAN OF CARE
Problem: Goal Outcome Summary  Goal: Goal Outcome Summary  PT: Order received; Initial evaluation completed and treatment initiated. Prior to recent illnesses patient was independent and active without an assistive device, living in a townhome alone during the week and with a significant other on the weekends. Patient swam twice weekly. Patient admitted with worsening LE cellulitis, weakness & falls. Patient found to have worsened LE edema, cellulitis, abnormal liver function tests with suspected ETOH abuse.   Discharge Planner PT   Patient plan for discharge: Open to TCU  Current status: Patient SBA for bed mobility and CGA with a rolling walker for gait and transfers; decreased activity tolerance, weakness, dizziness, and LE edema. Hgb 7.7 today; VSS  Barriers to return to prior living situation: lives alone during the week; current need for assist; [facility]  Recommendations for discharge: Appears patient could benefit from a TCU stay at discharge   Rationale for recommendations: weakness; decreased activity tolerance; dizziness with activity; impaired functional mobility       Entered by: Annia Horton 06/10/2017 10:46 AM

## 2017-06-10 NOTE — PLAN OF CARE
Problem: Goal Outcome Summary  Goal: Goal Outcome Summary  Outcome: No Change  A and O x4. Ambulated assist of 1 and walker. CIWA 4 and 1. Nephrology and GI following, creatinine improved today.

## 2017-06-10 NOTE — CONSULTS
[facility]  Gastroenterology Consultation         Kavitha Headley  [address] [facility] [address]  71 year old female    Admission Date/Time: 6/6/2017  Primary Care Provider: Alison Pena  Referring / Attending Physician:  Dr. Dejesus    We were asked to see the patient in consultation by Dr. Dejesus for evaluation of JAundice      CC: Jaundice    HPI:  Kavitha Headley is a 71 year old female who past medical history of hypertension, dyslipidemia, obesity status post gastric bypass, alcohol abuse, chronic pain syndrome on a pain agreement, history of left lower extremity cellulitis, history of macrocytic anemia, moderate aortic stenosis, chronic kidney disease stage III, history of supraventricular tachycardia, depression/anxiety, who came in for evaluation of bilateral lower extremity swelling and erythema.  She reports that usually her legs are not swollen, although she does take Lasix at home p.r.n. for leg swelling.  She states that she started having more swelling in her lower extremities 2 weeks ago.  Initially it was more obvious on left lower extremity.  Then, more obvious on the right lower extremity.  She went to see her primary care physician for this.  It seems that on 05/26 she was started on clindamycin. Apparently she started having nausea and diarrhea while she was on clindamycin, so when she saw her primary care physician again on 05/30, the clindamycin was stopped and she was started on minocycline.  On 06/02, she was seen again in her primary care physician's office and she was told to start taking Keflex as well in addition to minocycline.  Despite all these different antibiotics, she continued to have lower extremity swelling, redness and some warmth reportedly.  She states that for the last few days she started taking Lasix 20 mg daily, but despite this, swelling again did not improve.  Upon further questioning, she denies any fever at home.  She denies any chest  pain.      Patient was admitted with above history on 6/7. Patient has long standing h/o moderate to heavy ETOH use.  Patient has been shaky. Patient has elevated LFTs. AST mor than ALT.  Patient has elevated of Bili. Mostly direct and elevated Alk phos. Low albumin, and low platelets.  Patient denying H/O fever, chills, chest pain.     ROS: A comprehensive ten point review of systems was negative aside from those in mentioned in the HPI.      PAST MED HX:  I have reviewed this patient's medical history and updated it with pertinent information if needed.   Past Medical History:   Diagnosis Date     Alcohol abuse     long term alcohol abuse     Anxiety disorder      Bariatric surgery status 1996?    gastric bypass, [facility] and     Benign hypertension      Chronic insomnia      Chronic low back pain      Chronic pain syndrome     Chronic back and neck pain, chronic pain due to osteoarthritis multiple joints     Coronary artery disease 9/2015    mild distal branch disease on cath     Disc disease, degenerative, cervical     C4-C7 disc disease     Diverticulitis     inpatient therapy 6/2006     Hip joint replacement status 4/2004    right     Knee joint replacement status 12/2005    left     Macrocytic anemia     Mild macrocytic anemia, 2012 to present, likely based on alcohol abuse.     Major depressive disorder, single episode, severe, without mention of psychotic behavior      Mixed hyperlipidemia      Moderate aortic stenosis 5/2014    mild/mod AS with peak gradient 33/mean gradient 20 mmHg, AV area 1.2     Pelvic relaxation disorder     Surgical intervention for cystocele/rectocele 3,11/2012     Personal history of urinary calculi 6/2006    left ureteral stone,lithotripsy     PVC (premature ventricular contraction)      Stage III chronic kidney disease 2005     SVT (supraventricular tachycardia) (H)     likely atrial tachycardia       MEDICATIONS:   Prior to Admission Medications   Prescriptions Last Dose  Informant Patient Reported? Taking?   GABAPENTIN PO 6/5/2017 at hs  Yes Yes   Sig: Take 600 mg by mouth At Bedtime   GABAPENTIN PO prn  Yes Yes   Sig: Take 300 mg by mouth daily as needed In the morning if needed   Multiple Vitamins-Minerals (CENTRUM SILVER) per tablet 6/5/2017  Yes Yes   Sig: Take 1 tablet by mouth daily   Potassium Chloride ER 20 MEQ TBCR NEW  No No   Sig: Take 1 tablet (20 mEq) by mouth daily   aspirin 81 MG tablet 6/5/2017 at am  Yes Yes   Sig: Take 81 mg by mouth daily   atorvastatin (LIPITOR) 40 MG tablet 6/5/2017 at hs  No Yes   Sig: Take 1 tablet (40 mg) by mouth daily   cephALEXin (KEFLEX) 500 MG capsule 6/5/2017  No Yes   Sig: Take 1 capsule (500 mg) by mouth 4 times daily for 10 days   furosemide (LASIX) 20 MG tablet 6/5/2017 at am  No Yes   Sig: Take 1 tablet (20 mg) by mouth daily   metoclopramide (REGLAN) 5 MG tablet 6/5/2017 at x2  No Yes   Sig: Take 1 tablet (5 mg) by mouth 4 times daily as needed   metoprolol (TOPROL XL) 25 MG 24 hr tablet 6/6/2017 at am  No Yes   Sig: Take 1 tablet (25 mg) by mouth daily   minocycline (MINOCIN/DYNACIN) 100 MG capsule 6/5/2017  No Yes   Sig: Take 1 capsule (100 mg) by mouth 2 times daily for 10 days   mirtazapine (REMERON) 45 MG tablet 6/5/2017 at hs  No Yes   Sig: Take 1 tablet (45 mg) by mouth At Bedtime   naproxen (NAPROSYN) 500 MG tablet prn  No Yes   Sig: Take 1 tablet (500 mg) by mouth 2 times daily as needed for moderate pain   temazepam (RESTORIL) 15 MG capsule prn  No Yes   Sig: TAKE 1 CAPSULES BY MOUTH AT BETIME AS NEEDED TO SLEEP. MUST LAST 30 DAYS.   traMADol (ULTRAM) 50 MG tablet prn  No Yes   Sig: TAKE 1 TABLET BY MOUTH TWICE DAILY AS NEEDED FOR MODERATE PAIN   traZODone (DESYREL) 100 MG tablet 6/5/2017 at hs  No Yes   Sig: TAKE 1 TABLET (100 MG) BY MOUTH AT BEDTIME   valACYclovir (VALTREX) 1000 mg tablet prn  No Yes   Sig: TAKE 2 TABS EVERY 12 HRS FOR 1 DAY ONLY FOR OUTBREAKS OF COLD SORES as needed      Facility-Administered  Medications: None       ALLERGIES:   Allergies   Allergen Reactions     Bactrim [Sulfamethoxazole W/Trimethoprim] Hives     Codeine Itching     NAUSEA     Morphine Itching     NAUSEA       SOCIAL HISTORY:  Social History   Substance Use Topics     Smoking status: Never Smoker     Smokeless tobacco: Never Used     Alcohol use 0.0 oz/week     0 Standard drinks or equivalent per week      Comment: 2-3 drinks per day       FAMILY HISTORY:  Family History   Problem Relation Age of Onset     Substance Abuse Father      CANCER Father      throat and lung mets     DIABETES No family hx of      Coronary Artery Disease No family hx of      CEREBROVASCULAR DISEASE No family hx of        PHYSICAL EXAM:   General  Awake, alert oriented, tremors  Vital Signs with Ranges  Temp: 98.5  F (36.9  C) Temp src: Oral BP: 110/70   Heart Rate: 81 Resp: 18 SpO2: 96 % O2 Device: None (Room air)    I/O last 3 completed shifts:  In: 850 [P.O.:750; I.V.:100]  Out: 335 [Urine:335]    Constitutional: healthy, alert and no distress   Cardiovascular: negative, PMI normal. No lifts, heaves, or thrills. RRR. No murmurs, clicks gallops or rub  Respiratory: negative, Percussion normal. Good diaphragmatic excursion. Lungs clear  Head: Normocephalic. No masses, lesions, tenderness or abnormalities  Neck: Neck supple. No adenopathy. Thyroid symmetric, normal size,, Carotids without bruits.  Abdomen: Abdomen soft, non-tender. BS normal. No masses, Liver 3 fingers below costal margin.  NEURO: Gait normal. Reflexes normal and symmetric. Sensation grossly WNL.  SKIN: no suspicious lesions or rashes  LYMPH: Normal cervical lymph nodes          ADDITIONAL COMMENTS:   I reviewed the patient's new clinical lab test results.   Recent Labs   Lab Test  06/09/17   0745  06/08/17   0746  06/07/17   0720   09/29/15   0840   04/11/12   0600   WBC  7.4  6.3  6.2   < >  5.6   < >   --    HGB  12.1  11.1*  11.3*   < >  10.1*   < >   --    MCV  102*  100  99   < >  92   < >    --    PLT  118*  143*  166   < >  327   < >   --    INR   --    --   1.02   --   0.86   --   0.93    < > = values in this interval not displayed.     Recent Labs   Lab Test  06/09/17   0745  06/08/17   0746  06/07/17 2010 06/07/17   0720   POTASSIUM  4.4  4.3  3.7   < >  2.8*   CHLORIDE  84*  85*   --    --   85*   CO2  34*  37*   --    --   36*   BUN  26  22   --    --   18   ANIONGAP  8  7   --    --   9    < > = values in this interval not displayed.     Recent Labs   Lab Test  06/09/17   1335  06/09/17   0745  06/08/17   0746  06/07/17   0720  06/06/17   2300   10/19/15   1447   11/06/10   1415   ALBUMIN   --   2.4*  2.3*  2.2*   --    < >   --    < >  3.7   BILITOTAL   --   5.4*  4.1*  2.4*   --    < >   --    < >  0.5   ALT   --   48  82*  115*   --    < >   --    < >  35   AST   --   110*  142*  212*   --    < >   --    < >  60*   PROTEIN  10*   --    --    --   Negative   --   Negative   < >   --    LIPASE   --    --    --    --    --    --    --    --   139    < > = values in this interval not displayed.       I reviewed the patient's new imaging results.        CONSULTATION ASSESSMENT AND PLAN:    Active Problems:    Cellulitis    Assessment: 73 year old female with acute on chronic hepatitis. Likely multifactorial. ETOH, drug induced likely antibiotics, and infection. Cholestatic and hepatitis mix pateren  Patient has signs of chronic liver disease and likely Cirrhosis. Possible malnutrition.  Patient has signs of possible withdrawal.      Plan: Continue on current plan.  Check autoimmune markers.  Awaiting hepatitis serologies.  Repeat labs including INR and ammonia levels.  Appreciate nephrology input. Hyponatremia.    Thank you very much for letting us participate in her care.          Parth Graham MD, FACP  [facility].  Office: [phone]  Cell : [phone]

## 2017-06-10 NOTE — PROGRESS NOTES
Assessment and Plan:   LYLY: adm with bilateral edema, hx of LE cellulitis. Noted to have hyponatremia.  Has hx of nephrolithiasis. I/O yest 640/285. Wt adm 85.2 > 86.2 today. On NS IV. Cr: 1.42 > 1.04. Lytes show K 3.8 and HCO3 31. Na 131.     Cont IVF with K  No diuretics.   Follow labs.             Interval History:   Hypertension: metoprolol 12.5 mg/day. BP good.   Obesity S/P bariatric surgery  Abnl LFT: ? ETOH.                    Review of Systems:   Taking PO well. No SOB , no CP or abd pain.           Medications:       omeprazole  20 mg Oral QAM AC     vortioxetine  5 mg Oral Daily     clindamycin  300 mg Oral Q8H AMOR     metoprolol  12.5 mg Oral Daily     aspirin EC  81 mg Oral Daily     gabapentin (NEURONTIN) tablet 600 mg  600 mg Oral At Bedtime     gabapentin (NEURONTIN) capsule 300 mg  300 mg Oral Daily     mirtazapine  45 mg Oral At Bedtime     multivitamin, therapeutic  1 tablet Oral Daily     traZODone  100 mg Oral At Bedtime     sodium chloride (PF)  3 mL Intracatheter Q8H     folic acid  1 mg Oral Daily     vitamin  B-1  50 mg Oral Daily       NaCl Stopped (06/10/17 0746)     Current active medications and PTA medications reviewed, see medication list for details.            Physical Exam:   Vitals were reviewed  Patient Vitals for the past 24 hrs:   BP Temp Temp src Heart Rate Resp SpO2 Weight   06/10/17 0729 111/76 98  F (36.7  C) Oral 86 18 97 % -   06/10/17 0617 - - - - - - 86.2 kg (190 lb 0.6 oz)   17 2123 120/82 97.9  F (36.6  C) Oral 92 18 93 % -   17 1602 110/70 98.5  F (36.9  C) Oral 81 18 96 % -       Temp:  [97.9  F (36.6  C)-98.5  F (36.9  C)] 98  F (36.7  C)  Heart Rate:  [81-92] 86  Resp:  [18] 18  BP: (110-120)/(70-82) 111/76  SpO2:  [93 %-97 %] 97 %    Temperatures:  Current - Temp: 98  F (36.7  C); Max - Temp  Av.1  F (36.7  C)  Min: 97.9  F (36.6  C)  Max: 98.5  F (36.9  C)  Respiration range: Resp  Av  Min: 18  Max: 18  Pulse range: No Data  Recorded  Blood pressure range: Systolic (24hrs), Av , Min:110 , Max:120   ; Diastolic (24hrs), Av, Min:70, Max:82    Pulse oximetry range: SpO2  Av.3 %  Min: 93 %  Max: 97 %    I/O last 3 completed shifts:  In: 480 [P.O.:480]  Out: 785 [Urine:785]      Intake/Output Summary (Last 24 hours) at 06/10/17 1419  Last data filed at 06/10/17 1129   Gross per 24 hour   Intake             1939 ml   Output             1025 ml   Net              914 ml       Alert, resting in bed  LE minimal edema, no redness  Cor RRR nl S1 S2 2/6 sys M  Lungs with clear ant BS       Wt Readings from Last 4 Encounters:   06/10/17 86.2 kg (190 lb 0.6 oz)   17 84.4 kg (186 lb)   17 87.7 kg (193 lb 6.4 oz)   17 84.8 kg (187 lb)          Data:          Lab Results   Component Value Date     06/10/2017     2017     2017    Lab Results   Component Value Date    CHLORIDE 92 06/10/2017    CHLORIDE 84 2017    CHLORIDE 85 2017    Lab Results   Component Value Date    BUN 20 06/10/2017    BUN 26 2017    BUN 22 2017      Lab Results   Component Value Date    POTASSIUM 3.8 06/10/2017    POTASSIUM 4.4 2017    POTASSIUM 4.3 2017    Lab Results   Component Value Date    CO2 31 06/10/2017    CO2 34 2017    CO2 37 2017    Lab Results   Component Value Date    CR 1.04 06/10/2017    CR 1.42 2017    CR 1.19 2017        Recent Labs   Lab Test  06/10/17   0823  17   0745  17   0746   WBC  7.2  7.4  6.3   HGB  11.3*  12.1  11.1*   HCT  30.8*  33.6*  30.2*   MCV  100  102*  100   PLT  77*  118*  143*     Recent Labs   Lab Test  06/10/17   0823  17   0745  17   0746   16   1235   01/10/13   1336  12   1349   AST  66*  110*  142*   < >  59*   < >  28  24   ALT  20  48  82*   < >  43   < >  25  12   GGT   --    --    --    --   80*   --    --    --    ALKPHOS  215*  219*  180*   < >  90   < >  67  71   BILITOTAL   4.2*  5.4*  4.1*   < >  0.7   < >  0.4  0.5   BILICONJ   --    --    --    --    --    --   0.0  0.0   RAULITO  15   --    --    --    --    --    --    --     < > = values in this interval not displayed.       Recent Labs   Lab Test  06/07/17 2010 06/07/17   1720  06/07/17   0720   MAG  2.5*  Canceled, Test credited   Specimen Lost, Testing unable to be completed  DIEGO BREAUX RN IN 66 NOTIFIED @ 2005 FK    1.5*     Recent Labs   Lab Test  04/13/12   0705   PHOS  2.4*     Recent Labs   Lab Test  06/10/17   0823  06/09/17   0745  06/08/17   0746   BIRGIT  7.8*  8.5  8.3*       Lab Results   Component Value Date    BIRGIT 7.8 (L) 06/10/2017     Lab Results   Component Value Date    WBC 7.2 06/10/2017    HGB 11.3 (L) 06/10/2017    HCT 30.8 (L) 06/10/2017     06/10/2017    PLT 77 (L) 06/10/2017     Lab Results   Component Value Date     (L) 06/10/2017    POTASSIUM 3.8 06/10/2017    CHLORIDE 92 (L) 06/10/2017    CO2 31 06/10/2017    GLC 75 06/10/2017     Lab Results   Component Value Date    BUN 20 06/10/2017    CR 1.04 06/10/2017     Lab Results   Component Value Date    MAG 2.5 (H) 06/07/2017     Lab Results   Component Value Date    PHOS 2.4 (L) 04/13/2012       Creatinine   Date Value Ref Range Status   06/10/2017 1.04 0.52 - 1.04 mg/dL Final   06/09/2017 1.42 (H) 0.52 - 1.04 mg/dL Final   06/08/2017 1.19 (H) 0.52 - 1.04 mg/dL Final   06/07/2017 0.88 0.52 - 1.04 mg/dL Final   06/06/2017 0.89 0.52 - 1.04 mg/dL Final   06/06/2017 1.02 0.52 - 1.04 mg/dL Final       Attestation:  I have reviewed today's vital signs, notes, medications, labs and imaging.     Dallas Dorsey MD

## 2017-06-11 ENCOUNTER — APPOINTMENT (OUTPATIENT)
Dept: PHYSICAL THERAPY | Facility: CLINIC | Age: 72
DRG: 432 | End: 2017-06-11
Attending: HOSPITALIST
Payer: MEDICARE

## 2017-06-11 LAB
ALBUMIN SERPL-MCNC: 2.3 G/DL (ref 3.4–5)
ALP SERPL-CCNC: 257 U/L (ref 40–150)
ALT SERPL W P-5'-P-CCNC: 20 U/L (ref 0–50)
ANION GAP SERPL CALCULATED.3IONS-SCNC: 7 MMOL/L (ref 3–14)
AST SERPL W P-5'-P-CCNC: 68 U/L (ref 0–45)
BILIRUB SERPL-MCNC: 5.1 MG/DL (ref 0.2–1.3)
BUN SERPL-MCNC: 15 MG/DL (ref 7–30)
CALCIUM SERPL-MCNC: 7.9 MG/DL (ref 8.5–10.1)
CHLORIDE SERPL-SCNC: 95 MMOL/L (ref 94–109)
CO2 SERPL-SCNC: 32 MMOL/L (ref 20–32)
CREAT SERPL-MCNC: 0.85 MG/DL (ref 0.52–1.04)
D DIMER PPP FEU-MCNC: 1.8 UG/ML FEU (ref 0–0.5)
FIBRINOGEN PPP-MCNC: 194 MG/DL (ref 200–420)
GFR SERPL CREATININE-BSD FRML MDRD: 66 ML/MIN/1.7M2
GLUCOSE SERPL-MCNC: 75 MG/DL (ref 70–99)
INR PPP: 1.15 (ref 0.86–1.14)
LDH SERPL L TO P-CCNC: 376 U/L (ref 81–234)
MAGNESIUM SERPL-MCNC: 1.8 MG/DL (ref 1.6–2.3)
PHOSPHATE SERPL-MCNC: 1.8 MG/DL (ref 2.5–4.5)
PLATELET # BLD AUTO: 65 10E9/L (ref 150–450)
POTASSIUM SERPL-SCNC: 4.4 MMOL/L (ref 3.4–5.3)
PROT SERPL-MCNC: 5.9 G/DL (ref 6.8–8.8)
SODIUM SERPL-SCNC: 134 MMOL/L (ref 133–144)
URATE SERPL-MCNC: 7.2 MG/DL (ref 2.6–6)

## 2017-06-11 PROCEDURE — 97116 GAIT TRAINING THERAPY: CPT | Mod: GP

## 2017-06-11 PROCEDURE — 12000000 ZZH R&B MED SURG/OB

## 2017-06-11 PROCEDURE — 85379 FIBRIN DEGRADATION QUANT: CPT | Performed by: HOSPITALIST

## 2017-06-11 PROCEDURE — 25000132 ZZH RX MED GY IP 250 OP 250 PS 637: Mod: GY | Performed by: INTERNAL MEDICINE

## 2017-06-11 PROCEDURE — 83615 LACTATE (LD) (LDH) ENZYME: CPT | Performed by: HOSPITALIST

## 2017-06-11 PROCEDURE — A9270 NON-COVERED ITEM OR SERVICE: HCPCS | Mod: GY | Performed by: INTERNAL MEDICINE

## 2017-06-11 PROCEDURE — 84550 ASSAY OF BLOOD/URIC ACID: CPT | Performed by: HOSPITALIST

## 2017-06-11 PROCEDURE — A9270 NON-COVERED ITEM OR SERVICE: HCPCS | Mod: GY | Performed by: PSYCHIATRY & NEUROLOGY

## 2017-06-11 PROCEDURE — 85049 AUTOMATED PLATELET COUNT: CPT | Performed by: HOSPITALIST

## 2017-06-11 PROCEDURE — 85610 PROTHROMBIN TIME: CPT | Performed by: HOSPITALIST

## 2017-06-11 PROCEDURE — 25000132 ZZH RX MED GY IP 250 OP 250 PS 637: Mod: GY | Performed by: HOSPITALIST

## 2017-06-11 PROCEDURE — 25000132 ZZH RX MED GY IP 250 OP 250 PS 637: Mod: GY | Performed by: PSYCHIATRY & NEUROLOGY

## 2017-06-11 PROCEDURE — 83735 ASSAY OF MAGNESIUM: CPT | Performed by: HOSPITALIST

## 2017-06-11 PROCEDURE — 85384 FIBRINOGEN ACTIVITY: CPT | Performed by: HOSPITALIST

## 2017-06-11 PROCEDURE — A9270 NON-COVERED ITEM OR SERVICE: HCPCS | Mod: GY | Performed by: HOSPITALIST

## 2017-06-11 PROCEDURE — 99233 SBSQ HOSP IP/OBS HIGH 50: CPT | Performed by: HOSPITALIST

## 2017-06-11 PROCEDURE — 40000193 ZZH STATISTIC PT WARD VISIT

## 2017-06-11 PROCEDURE — 25000125 ZZHC RX 250: Performed by: INTERNAL MEDICINE

## 2017-06-11 PROCEDURE — 36415 COLL VENOUS BLD VENIPUNCTURE: CPT | Performed by: HOSPITALIST

## 2017-06-11 PROCEDURE — 86022 PLATELET ANTIBODIES: CPT | Performed by: HOSPITALIST

## 2017-06-11 PROCEDURE — 80053 COMPREHEN METABOLIC PANEL: CPT | Performed by: HOSPITALIST

## 2017-06-11 PROCEDURE — 84100 ASSAY OF PHOSPHORUS: CPT | Performed by: HOSPITALIST

## 2017-06-11 RX ADMIN — GABAPENTIN 600 MG: 600 TABLET, FILM COATED ORAL at 21:54

## 2017-06-11 RX ADMIN — TEMAZEPAM 7.5 MG: 7.5 CAPSULE ORAL at 21:54

## 2017-06-11 RX ADMIN — POTASSIUM CHLORIDE 20 MEQ: 1.5 POWDER, FOR SOLUTION ORAL at 21:54

## 2017-06-11 RX ADMIN — TRAMADOL HYDROCHLORIDE 50 MG: 50 TABLET, COATED ORAL at 21:54

## 2017-06-11 RX ADMIN — MAGNESIUM OXIDE TAB 400 MG (241.3 MG ELEMENTAL MG) 400 MG: 400 (241.3 MG) TAB at 09:34

## 2017-06-11 RX ADMIN — MIRTAZAPINE 45 MG: 15 TABLET, FILM COATED ORAL at 21:54

## 2017-06-11 RX ADMIN — ASPIRIN 81 MG: 81 TABLET, COATED ORAL at 09:34

## 2017-06-11 RX ADMIN — CLINDAMYCIN HYDROCHLORIDE 300 MG: 300 CAPSULE ORAL at 21:54

## 2017-06-11 RX ADMIN — POTASSIUM CHLORIDE 20 MEQ: 1.5 POWDER, FOR SOLUTION ORAL at 09:33

## 2017-06-11 RX ADMIN — GABAPENTIN 300 MG: 300 CAPSULE ORAL at 09:34

## 2017-06-11 RX ADMIN — CLINDAMYCIN HYDROCHLORIDE 300 MG: 300 CAPSULE ORAL at 14:33

## 2017-06-11 RX ADMIN — TRAZODONE HYDROCHLORIDE 100 MG: 100 TABLET ORAL at 21:54

## 2017-06-11 RX ADMIN — OMEPRAZOLE 20 MG: 20 CAPSULE, DELAYED RELEASE ORAL at 06:31

## 2017-06-11 RX ADMIN — RANITIDINE 150 MG: 150 TABLET ORAL at 21:54

## 2017-06-11 RX ADMIN — POTASSIUM CHLORIDE AND SODIUM CHLORIDE: 900; 150 INJECTION, SOLUTION INTRAVENOUS at 03:31

## 2017-06-11 RX ADMIN — CLINDAMYCIN HYDROCHLORIDE 300 MG: 300 CAPSULE ORAL at 06:30

## 2017-06-11 RX ADMIN — Medication 12.5 MG: at 09:33

## 2017-06-11 RX ADMIN — Medication 100 MG: at 09:50

## 2017-06-11 RX ADMIN — FOLIC ACID 1 MG: 1 TABLET ORAL at 09:34

## 2017-06-11 RX ADMIN — LORAZEPAM 1 MG: 1 TABLET ORAL at 10:03

## 2017-06-11 RX ADMIN — VORTIOXETINE 5 MG: 5 TABLET, FILM COATED ORAL at 09:34

## 2017-06-11 RX ADMIN — THERA TABS 1 TABLET: TAB at 09:34

## 2017-06-11 NOTE — PROGRESS NOTES
Assessment and Plan:   LYLY: Cr now nl. Na: 126 > 131 > 134. On MgOx, KCL: K and Mg nl now. Will stop NS infusion.     Will sign off.             Interval History:   Abnl LFT: tbili 5.1. Alk phos elevated ast is AST. Stop tylenol.       LE cellulitis: on clinda.   Hypertension:   Low dose metoprolol.            Review of Systems:   C/O anxiety. No pain.           Medications:       omeprazole  20 mg Oral QAM AC     magnesium oxide  400 mg Oral Daily     vitamin  B-1  100 mg Oral Daily     potassium chloride  20 mEq Oral BID     vortioxetine  5 mg Oral Daily     clindamycin  300 mg Oral Q8H AMOR     metoprolol  12.5 mg Oral Daily     gabapentin (NEURONTIN) tablet 600 mg  600 mg Oral At Bedtime     gabapentin (NEURONTIN) capsule 300 mg  300 mg Oral Daily     mirtazapine  45 mg Oral At Bedtime     multivitamin, therapeutic  1 tablet Oral Daily     traZODone  100 mg Oral At Bedtime     sodium chloride (PF)  3 mL Intracatheter Q8H     folic acid  1 mg Oral Daily       0.9% sodium chloride + KCl 20 mEq/L 100 mL/hr at 17 0331     Current active medications and PTA medications reviewed, see medication list for details.            Physical Exam:   Vitals were reviewed  Patient Vitals for the past 24 hrs:   BP Temp Temp src Heart Rate Resp SpO2 Weight   17 0740 114/75 97.9  F (36.6  C) Oral 79 16 98 % -   17 0653 - - - - - - 86.5 kg (190 lb 11.2 oz)   06/10/17 2313 122/72 98.7  F (37.1  C) Oral 82 18 95 % -   06/10/17 1523 114/74 99  F (37.2  C) Oral 82 18 94 % -       Temp:  [97.9  F (36.6  C)-99  F (37.2  C)] 97.9  F (36.6  C)  Heart Rate:  [79-82] 79  Resp:  [16-18] 16  BP: (114-122)/(72-75) 114/75  SpO2:  [94 %-98 %] 98 %    Temperatures:  Current - Temp: 97.9  F (36.6  C); Max - Temp  Av.5  F (36.9  C)  Min: 97.9  F (36.6  C)  Max: 99  F (37.2  C)  Respiration range: Resp  Av.3  Min: 16  Max: 18  Pulse range: No Data Recorded  Blood pressure range: Systolic (24hrs), Av , Min:114 ,  Max:122   ; Diastolic (24hrs), Av, Min:72, Max:75    Pulse oximetry range: SpO2  Av.7 %  Min: 94 %  Max: 98 %    I/O last 3 completed shifts:  In: 3382 [P.O.:180; I.V.:3202]  Out: 1450 [Urine:1450]      Intake/Output Summary (Last 24 hours) at 17 1313  Last data filed at 17 0632   Gross per 24 hour   Intake             1443 ml   Output             1150 ml   Net              293 ml       Alert, icteric  Lungs with clear ant BS  Cor RRR nl S1 S2 2/6 sys M  LE 1+ edema, no redness or tenderness       Wt Readings from Last 4 Encounters:   17 86.5 kg (190 lb 11.2 oz)   17 84.4 kg (186 lb)   17 87.7 kg (193 lb 6.4 oz)   17 84.8 kg (187 lb)          Data:          Lab Results   Component Value Date     2017     06/10/2017     2017    Lab Results   Component Value Date    CHLORIDE 95 2017    CHLORIDE 92 06/10/2017    CHLORIDE 84 2017    Lab Results   Component Value Date    BUN 15 2017    BUN 20 06/10/2017    BUN 26 2017      Lab Results   Component Value Date    POTASSIUM 4.4 2017    POTASSIUM 3.8 06/10/2017    POTASSIUM 4.4 2017    Lab Results   Component Value Date    CO2 32 2017    CO2 31 06/10/2017    CO2 34 2017    Lab Results   Component Value Date    CR 0.85 2017    CR 1.04 06/10/2017    CR 1.42 2017        Recent Labs   Lab Test  17   0910  06/10/17   0823  17   0745  17   0746   WBC   --   7.2  7.4  6.3   HGB   --   11.3*  12.1  11.1*   HCT   --   30.8*  33.6*  30.2*   MCV   --   100  102*  100   PLT  65*  77*  118*  143*     Recent Labs   Lab Test  17   0910  06/10/17   0823  17   0745   16   1235   01/10/13   1336  12   1349   AST  68*  66*  110*   < >  59*   < >  28  24   ALT  20  20  48   < >  43   < >  25  12   GGT   --    --    --    --   80*   --    --    --    ALKPHOS  257*  215*  219*   < >  90   < >  67  71   BILITOTAL  5.1*  4.2*   5.4*   < >  0.7   < >  0.4  0.5   BILICONJ   --    --    --    --    --    --   0.0  0.0   RAULITO   --   15   --    --    --    --    --    --     < > = values in this interval not displayed.       Recent Labs   Lab Test  06/11/17 0910 06/07/17 2010 06/07/17   1720   MAG  1.8  2.5*  Canceled, Test credited   Specimen Lost, Testing unable to be completed  DIEGO BREAUX RN IN 66 NOTIFIED @ 2005        Recent Labs   Lab Test  06/11/17 0910 04/13/12   0705   PHOS  1.8*  2.4*     Recent Labs   Lab Test  06/11/17   0910  06/10/17   0823  06/09/17   0745   BIRGIT  7.9*  7.8*  8.5       Lab Results   Component Value Date    BIRGIT 7.9 (L) 06/11/2017     Lab Results   Component Value Date    WBC 7.2 06/10/2017    HGB 11.3 (L) 06/10/2017    HCT 30.8 (L) 06/10/2017     06/10/2017    PLT 65 (L) 06/11/2017     Lab Results   Component Value Date     06/11/2017    POTASSIUM 4.4 06/11/2017    CHLORIDE 95 06/11/2017    CO2 32 06/11/2017    GLC 75 06/11/2017     Lab Results   Component Value Date    BUN 15 06/11/2017    CR 0.85 06/11/2017     Lab Results   Component Value Date    MAG 1.8 06/11/2017     Lab Results   Component Value Date    PHOS 1.8 (L) 06/11/2017       Creatinine   Date Value Ref Range Status   06/11/2017 0.85 0.52 - 1.04 mg/dL Final   06/10/2017 1.04 0.52 - 1.04 mg/dL Final   06/09/2017 1.42 (H) 0.52 - 1.04 mg/dL Final   06/08/2017 1.19 (H) 0.52 - 1.04 mg/dL Final   06/07/2017 0.88 0.52 - 1.04 mg/dL Final   06/06/2017 0.89 0.52 - 1.04 mg/dL Final       Attestation:  I have reviewed today's vital signs, notes, medications, labs and imaging.     Dallas Dorsey MD

## 2017-06-11 NOTE — PROGRESS NOTES
[facility]    Internal Medicine Hospitalist Progress Note  06/11/2017  I evaluated patient on the above date.    Assessment & Plan Ms. Kavitha Hartman is a pleasant 71-year-old female with past medical history including HTN, DLD, mod AS, dep/anx, EtOH abuse, chronic back pain, recent LE cellulitis ond oral antibiotics, who presented 6/6 with worsening LE erythema and swelling.    BLE cellulitis vs worsening chronic venous stasis:  Recently treated for cellulitis with abx including clindamycin, then switched to minocycline and Keflex (due to intolerance).  - On initial eval 6/6, pt afebrile, nl WBC. BLE US 6/6 negative. Started on IV vanco 6/6. Given a dose of IV Lasix 6/6.  - Improved 6/7. Procal 1.99 6/7. Seen by ID and abx changed to IV cefazolin 6/7 which was stopped 6/9  - Started clindamycin to stop after 6/13/2017 (Keflex recommended by ID, but given possible affect on LFT's (possible side effect of cholestatic jaundice), favored another option by ID, appreciate ID assistance).  - Keep BLE's elevated.  - Monitor cultures, NGTD  - Management of LE edema as below.    Abnormal liver function tests, suspect from acute EtOH hepatitis, possible chronic liver disease/early cirrhosis, ?acute worsening from meds: On admission with with elevated , , total bilirubin 2.3. Statin held on admission. Abd US 6/7 showed no acute findings and no nodular architecture but has fattly liver infiltration.  - Overall, suspect related to her daily alcohol drinking, but given cholestatic picture, concern if due to meds, vs autoimmune vs obstructive  - Has been on cefazolin, which could affect LFT's.  - Hepatitis panel negative, autoimmune serologies pending, off cefazolin as above.  - Abstinence from EtOH recommended.  - Holding PTA statin.  - LFTs slightly improved initially and now bili up.  - discussed with Dr Graham, plan for EGD to eval for varices and endoscopic US tomorrow, NPO after  midnight    Worsened chronic LE edema: PTA on PRN Lasix 20 mg daily. No signs of pulm edema or other signs of CHF.   * Given IV Lasix 6/6 as above.   * Leg swelling improved 6/7. Echo 6/7 showed LVEF >70%, suggestion of impaired LV relaxation, mod AS, ascending aorta is mildly dilated.   - Given worsened Cr and hyponatremia, Lasix held.  - Continue leg elevation.  - Edema has worsened with IVF, IVF stopped 6/11.    Acute kidney injury, stage 1 and   - Cr normal on admission, baseline 0.8-1, but increased upto 1.4  - Initially felt to be prerenal.  - Nephrology consulted, was maintained on NS, and now Cr better to baseline.  - Monitor BMP.  - Avoid nephrotoxic medications.    Hyponatremia, possibly nutrional or related to volume overload, ?due to liver disease.  - Na 129 on admission 6/6. Given Lasix 6/6.  - Hyponatremia workup labs not suggestive of SIADH, no overt CHF/volume overload  - tolerated IV fluid as above, and sodium 134 today  - appreciate nephrology assistance.    Thrombocytopenia.  * Unclear etiology; possibly from liver disease- though normal at presentatino, medication effect or from other cause.     Recent Labs  Lab 06/11/17  0910 06/10/17  0823 06/09/17  0745 06/08/17  0746 06/07/17  0720 06/06/17  1457   PLT 65* 77* 118* 143* 166 217   - Holding Lovenox.  - Monitor CBC.  - Consider platelet transfusion if needs a procedure and less than 50,000; or if less than 10,000.  - Will get HIT panel  - DIC panel including dimer, fibrinogen, INR    EtOH abuse.  * She reported drinking 2-3 cocktails per night. She denied having history of alcohol withdrawal in the past.    * Her blood work here suggests chronic alcoholic use with hyponatremia, hypokalemia, and elevated liver function tests; also fatty liver on US. INR normal.  * More tremulous 6/8, started on CIWA 6/8.  * Stable 6/9 and 6/10 though tremulous and unsteady.  - On CIWA protocol.  - Abstinence from EtOH recommended.    Hypokalemia, could be due  "nutritional or related to PRN Lasix at home.  - on K replacement protocol.    Moderate aortic stenosis.  * Her last echocardiogram was in 08/2015.  Follows with [facility].  * Echo 6/7 showed LVEF >70%, suggestion of impaired LV relaxation, mod AS, ascending aorta is mildly dilated.  - Monitor i/o's, daily wts.  - F/u Cardiologist for further monitoring.    Hypertension (benign essential).  [PTA: metoprolol XL 25 mg daily.]  - Continue metoprolol XL.    Dyslipidemia.  - Holding atorvastatin as above.    Depression/anxiety.   * Psychiatry consulted and started Trintellix and Seroquel 6/9.  - Hold on starting Seroquel due to rising LFT's.  - Continue Remeron and trazodone.   - Apprec help from Dr. Vargas    Insomnia.  - Continue PRN temazepam.    Chronic back pain.  * On narcotic agreement.  - Continue gabapentin, PTA PRN Ultram 50 mg BID.    Prophylaxis.  - PCD's. lovenox stopped due to thrombocytopenia  - add PPI given heart burn symptoms    CODE STATUS: FULL    Dispo.  - Pending above in particular await improvement/stability in hepatic and renal function and work up above.  - PT eval prior to dc.  - I d/w pt's daughter 6/10.    Interval History    C/o abdominal bloating and discomfort but no nausea, diarrhea or pain.  - felt her \"head was on fire\", felt anxious, was shaky. Received 1 mg ativan and has since felt better.     -Data reviewed today: I reviewed all new labs over the last 24 hours. I personally reviewed no images or EKG's today.    Physical Exam   Heart Rate: 79, Blood pressure 114/75, pulse 90, temperature 97.9  F (36.6  C), temperature source Oral, resp. rate 16, height 1.626 m (5' 4\"), weight 86.5 kg (190 lb 11.2 oz), SpO2 98 %, not currently breastfeeding.  Vitals:    06/09/17 0542 06/10/17 0617 06/11/17 0653   Weight: 85.7 kg (188 lb 15 oz) 86.2 kg (190 lb 0.6 oz) 86.5 kg (190 lb 11.2 oz)     Vital Signs with Ranges  Temp:  [97.9  F (36.6  C)-99  F (37.2  C)] 97.9  F (36.6  C)  Heart Rate:  " [79-82] 79  Resp:  [16-18] 16  BP: (114-122)/(72-75) 114/75  SpO2:  [94 %-98 %] 98 %  Patient Vitals for the past 24 hrs:   BP Temp Temp src Heart Rate Resp SpO2 Weight   06/11/17 0740 114/75 97.9  F (36.6  C) Oral 79 16 98 % -   06/11/17 0653 - - - - - - 86.5 kg (190 lb 11.2 oz)   06/10/17 2313 122/72 98.7  F (37.1  C) Oral 82 18 95 % -   06/10/17 1523 114/74 99  F (37.2  C) Oral 82 18 94 % -     I/O's Last 24 hours  I/O last 3 completed shifts:  In: 3382 [P.O.:180; I.V.:3202]  Out: 1450 [Urine:1450]    Constitutional: Jaundiced. Alert, oriented, pleasant   HEENT: PERRLA, EOMI, mild icterus noted. Face is puffy.  Respiratory: Clear, no crackles/wheezes. On room air.  Cardiovascular: RRR, +I-II/VI sys murmur, no tachycardia  GI: Soft, non tender, BS (+)  Skin/Integumen: BLE edema noted, no redness noted.    Neuro: AAOx3, follows verbal commands appropriately. CN 2-12 intact.       Data     Recent Labs  Lab 06/11/17  0910 06/10/17  0823 06/09/17  0745 06/08/17  0746  06/07/17  0720   WBC  --  7.2 7.4 6.3  --  6.2   HGB  --  11.3* 12.1 11.1*  --  11.3*   MCV  --  100 102* 100  --  99   PLT 65* 77* 118* 143*  --  166   INR  --  1.19*  --   --   --  1.02    131* 126* 129*  --  130*   POTASSIUM 4.4 3.8 4.4 4.3  < > 2.8*   CHLORIDE 95 92* 84* 85*  --  85*   CO2 32 31 34* 37*  --  36*   BUN 15 20 26 22  --  18   CR 0.85 1.04 1.42* 1.19*  --  0.88   ANIONGAP 7 8 8 7  --  9   BIRGIT 7.9* 7.8* 8.5 8.3*  --  7.9*   GLC 75 75 88 64*  --  66*   ALBUMIN 2.3* 2.0* 2.4* 2.3*  --  2.2*   PROTTOTAL 5.9* 5.0* 5.6* 5.0*  --  5.0*   BILITOTAL 5.1* 4.2* 5.4* 4.1*  --  2.4*   ALKPHOS 257* 215* 219* 180*  --  173*   ALT 20 20 48 82*  --  115*   AST 68* 66* 110* 142*  --  212*   < > = values in this interval not displayed.  Recent Labs   Lab Test  06/11/17   0910  06/10/17   0823  06/09/17   0745  06/08/17   0746  06/07/17   0720   04/11/12   1214  04/11/12   0649   GLC  75  75  88  64*  66*   < >   --    --    BGM   --    --    --    --     --    --   134*  112*    < > = values in this interval not displayed.       No results found for this or any previous visit (from the past 24 hour(s)).    Medications   All medications were reviewed.       omeprazole  20 mg Oral QAM AC     magnesium oxide  400 mg Oral Daily     vitamin  B-1  100 mg Oral Daily     potassium chloride  20 mEq Oral BID     vortioxetine  5 mg Oral Daily     clindamycin  300 mg Oral Q8H AMOR     metoprolol  12.5 mg Oral Daily     gabapentin (NEURONTIN) tablet 600 mg  600 mg Oral At Bedtime     gabapentin (NEURONTIN) capsule 300 mg  300 mg Oral Daily     mirtazapine  45 mg Oral At Bedtime     multivitamin, therapeutic  1 tablet Oral Daily     traZODone  100 mg Oral At Bedtime     sodium chloride (PF)  3 mL Intracatheter Q8H     folic acid  1 mg Oral Daily

## 2017-06-11 NOTE — PLAN OF CARE
Problem: Goal Outcome Summary  Goal: Goal Outcome Summary  Outcome: No Change  A and O x4. CIWA 10, ativan given x1, recheck CIWA 1. Ambulates walker and gait assist of 1. Monitoring liver function. GI and Nephrology following.

## 2017-06-11 NOTE — PLAN OF CARE
Problem: Goal Outcome Summary  Goal: Goal Outcome Summary  Outcome: Therapy, progress toward functional goals as expected  Patient plan for discharge: Open to TCU  Current status: SBA for bed mobility and CGA with a rolling walker sit to/from stand transfers. On/off toilet with close SBA, one LOB when turning to back up to toilet corrected with pt reaching out for sink for support. Amb 200 ft x 2 with FWW and close SBA. Pt demonstrates decreased activity tolerance, weakness, tremors, and LE edema.   Barriers to return to prior living situation: lives alone during the week; current need for assist; 1 step to enter [facility]  Recommendations for discharge: Appears patient could benefit from a TCU stay at discharge   Rationale for recommendations: weakness; decreased activity tolerance; impaired functional mobility

## 2017-06-11 NOTE — PLAN OF CARE
Problem: Goal Outcome Summary  Goal: Goal Outcome Summary  Outcome: No Change  A&Ox4.  VSS, on RA.  Up w/ asst 1 and walker.  BLE red, 2+ edema, improving.  CIWA minimal, no ativan given.  Denies pain.  PRN Restoril at bedtime.  Skin jaundice.  Nephrology and GI following.  IVF infusing.  D/C 2-3 days to TCU, nursing will continue to monitor.

## 2017-06-11 NOTE — PLAN OF CARE
Problem: Goal Outcome Summary  Goal: Goal Outcome Summary  Outcome: No Change  Pt A&Ox3-4. VSS. CIWA scores 3 and 4 for tremors in the UE. Pt reports feeling weaker today and not herself. SBA 1p with walker. Bed alarm on for safety. NS+20K running at 100ml/hr. Pt lost IV access and flying squad was able to replace this evening. Diet fair with good oral intake. Cellulitis of LE resolving.

## 2017-06-11 NOTE — PROGRESS NOTES
[facility]  Gastroenterology Progress Note     Kavitha Headley MRN# [id number]   YOB: 1945 Age: 71 year old          Assessment and Plan:     Cellulitis improving.  Acute on chronic liver disease. Likely underlying cirrhosis with acute ETOH and possible drug induced hepatitis. Patient has persistently elevated Bili and alk phos more than AST, ALT. Bili increased from yesterday to 5.1 from 4.2, Alk phos up 257. Awaiting autoimmune serologies and AMA.  Patient has decrease in plt to 68K and worsening of bili.   Patient c/o abdominal pain last night and needed ativan.  Increase swelling and puffiness. Pt has been of lasix.  I will repeat labs tomorrow. D/W Dr. Mills to r/o possible DIC, vs hyper spleenism for low platelets.  Schedule for EGD and EUS tomorrow to r/o extrahepatic biliary obstruction and portal HTN.  Electrolytes and cr. Better. Possible repeat diuresis if OK with nephrology.              Cellulitis of right lower extremity  Hypokalemia  Elevated LFTs      Interval History:   doing well; no cp, sob, n/v/d, or abd pain.              Review of Systems:   C: NEGATIVE for fever, chills, change in weight  E/M: NEGATIVE for ear, mouth and throat problems  R: NEGATIVE for significant cough or SOB  CV: NEGATIVE for chest pain, palpitations or peripheral edema             Medications:   I have reviewed this patient's current medications    omeprazole  20 mg Oral QAM AC     magnesium oxide  400 mg Oral Daily     vitamin  B-1  100 mg Oral Daily     potassium chloride  20 mEq Oral BID     vortioxetine  5 mg Oral Daily     clindamycin  300 mg Oral Q8H AMOR     metoprolol  12.5 mg Oral Daily     gabapentin (NEURONTIN) tablet 600 mg  600 mg Oral At Bedtime     gabapentin (NEURONTIN) capsule 300 mg  300 mg Oral Daily     mirtazapine  45 mg Oral At Bedtime     multivitamin, therapeutic  1 tablet Oral Daily     traZODone  100 mg Oral At Bedtime     sodium chloride (PF)  3 mL Intracatheter  Q8H     folic acid  1 mg Oral Daily                  Physical Exam:   Vitals were reviewed  Vital Signs with Ranges  Temp:  [97.9  F (36.6  C)-99  F (37.2  C)] 97.9  F (36.6  C)  Heart Rate:  [79-82] 79  Resp:  [16-18] 16  BP: (114-122)/(72-75) 114/75  SpO2:  [94 %-98 %] 98 %  I/O last 3 completed shifts:  In: 3382 [P.O.:180; I.V.:3202]  Out: 1450 [Urine:1450]  Constitutional: healthy, alert and no distress   Cardiovascular: negative, PMI normal. No lifts, heaves, or thrills. RRR. No murmurs, clicks gallops or rub  Respiratory: negative, Percussion normal. Good diaphragmatic excursion. Lungs clear  Head: Normocephalic. No masses, lesions, tenderness or abnormalities  Neck: Neck supple. No adenopathy. Thyroid symmetric, normal size,, Carotids without bruits.  Abdomen: Abdomen soft, non-tender. BS normal. No masses, organomegaly  NEURO: Gait normal. Reflexes normal and symmetric. Sensation grossly WNL.  SKIN: no suspicious lesions or rashes, Jaundice.  JOINT/EXTREMITIES: 1-2 + edema. No hyrepmia.           Data:   I reviewed the patient's new clinical lab test results.   Recent Labs   Lab Test  06/11/17   0910  06/10/17   0823  06/09/17   0745  06/08/17   0746  06/07/17   0720   09/29/15   0840   WBC   --   7.2  7.4  6.3  6.2   < >  5.6   HGB   --   11.3*  12.1  11.1*  11.3*   < >  10.1*   MCV   --   100  102*  100  99   < >  92   PLT  65*  77*  118*  143*  166   < >  327   INR   --   1.19*   --    --   1.02   --   0.86    < > = values in this interval not displayed.     Recent Labs   Lab Test  06/11/17   0910  06/10/17   0823  06/09/17   0745   POTASSIUM  4.4  3.8  4.4   CHLORIDE  95  92*  84*   CO2  32  31  34*   BUN  15  20  26   ANIONGAP  7  8  8     Recent Labs   Lab Test  06/11/17   0910  06/10/17   0823  06/09/17   1335  06/09/17   0745   06/06/17   2300   10/19/15   1447   11/06/10   1415   ALBUMIN  2.3*  2.0*   --   2.4*   < >   --    < >   --    < >  3.7   BILITOTAL  5.1*  4.2*   --   5.4*   < >   --    < >    --    < >  0.5   ALT  20  20   --   48   < >   --    < >   --    < >  35   AST  68*  66*   --   110*   < >   --    < >   --    < >  60*   PROTEIN   --    --   10*   --    --   Negative   --   Negative   < >   --    LIPASE   --    --    --    --    --    --    --    --    --   139    < > = values in this interval not displayed.       I reviewed the patient's new imaging results.    All laboratory data reviewed  All imaging studies reviewed by me.    Parth Graham MD,  6/11/2017  [facility]  Office : [phone]  Cell: [phone]

## 2017-06-12 LAB
ALBUMIN SERPL-MCNC: 2.1 G/DL (ref 3.4–5)
ALP SERPL-CCNC: 227 U/L (ref 40–150)
ALT SERPL W P-5'-P-CCNC: 18 U/L (ref 0–50)
ANA SER QL IA: NORMAL
ANION GAP SERPL CALCULATED.3IONS-SCNC: 3 MMOL/L (ref 3–14)
AST SERPL W P-5'-P-CCNC: 54 U/L (ref 0–45)
BACTERIA SPEC CULT: NO GROWTH
BACTERIA SPEC CULT: NO GROWTH
BASOPHILS # BLD AUTO: 0 10E9/L (ref 0–0.2)
BASOPHILS NFR BLD AUTO: 0.7 %
BILIRUB SERPL-MCNC: 5.2 MG/DL (ref 0.2–1.3)
BUN SERPL-MCNC: 12 MG/DL (ref 7–30)
CALCIUM SERPL-MCNC: 7.7 MG/DL (ref 8.5–10.1)
CHLORIDE SERPL-SCNC: 98 MMOL/L (ref 94–109)
CO2 SERPL-SCNC: 32 MMOL/L (ref 20–32)
CREAT SERPL-MCNC: 0.92 MG/DL (ref 0.52–1.04)
DIFFERENTIAL METHOD BLD: ABNORMAL
EOSINOPHIL # BLD AUTO: 0.2 10E9/L (ref 0–0.7)
EOSINOPHIL NFR BLD AUTO: 3.9 %
ERYTHROCYTE [DISTWIDTH] IN BLOOD BY AUTOMATED COUNT: 15.3 % (ref 10–15)
FIBRINOGEN PPP-MCNC: 166 MG/DL (ref 200–420)
GFR SERPL CREATININE-BSD FRML MDRD: 60 ML/MIN/1.7M2
GLUCOSE SERPL-MCNC: 75 MG/DL (ref 70–99)
HCT VFR BLD AUTO: 31 % (ref 35–47)
HGB BLD-MCNC: 11.3 G/DL (ref 11.7–15.7)
IMM GRANULOCYTES # BLD: 0 10E9/L (ref 0–0.4)
IMM GRANULOCYTES NFR BLD: 0.2 %
LYMPHOCYTES # BLD AUTO: 1.2 10E9/L (ref 0.8–5.3)
LYMPHOCYTES NFR BLD AUTO: 21.1 %
Lab: NORMAL
Lab: NORMAL
MCH RBC QN AUTO: 36.3 PG (ref 26.5–33)
MCHC RBC AUTO-ENTMCNC: 36.5 G/DL (ref 31.5–36.5)
MCV RBC AUTO: 100 FL (ref 78–100)
MICRO REPORT STATUS: NORMAL
MICRO REPORT STATUS: NORMAL
MITOCHONDRIA M2 IGG SER-ACNC: 1 U/ML
MONOCYTES # BLD AUTO: 0.9 10E9/L (ref 0–1.3)
MONOCYTES NFR BLD AUTO: 15.1 %
NEUTROPHILS # BLD AUTO: 3.5 10E9/L (ref 1.6–8.3)
NEUTROPHILS NFR BLD AUTO: 59 %
NRBC # BLD AUTO: 0 10*3/UL
NRBC BLD AUTO-RTO: 0 /100
PF4 HEPARIN CMPLX AB SER QL: NORMAL
PLATELET # BLD AUTO: 51 10E9/L (ref 150–450)
POTASSIUM SERPL-SCNC: 4.5 MMOL/L (ref 3.4–5.3)
PROT SERPL-MCNC: 5 G/DL (ref 6.8–8.8)
RBC # BLD AUTO: 3.11 10E12/L (ref 3.8–5.2)
SMA IGG SER-ACNC: 17
SODIUM SERPL-SCNC: 133 MMOL/L (ref 133–144)
SPECIMEN SOURCE: NORMAL
SPECIMEN SOURCE: NORMAL
WBC # BLD AUTO: 5.9 10E9/L (ref 4–11)

## 2017-06-12 PROCEDURE — A9270 NON-COVERED ITEM OR SERVICE: HCPCS | Mod: GY | Performed by: HOSPITALIST

## 2017-06-12 PROCEDURE — A9270 NON-COVERED ITEM OR SERVICE: HCPCS | Mod: GY | Performed by: PSYCHIATRY & NEUROLOGY

## 2017-06-12 PROCEDURE — 43237 ENDOSCOPIC US EXAM ESOPH: CPT | Performed by: INTERNAL MEDICINE

## 2017-06-12 PROCEDURE — 25000132 ZZH RX MED GY IP 250 OP 250 PS 637: Mod: GY | Performed by: INTERNAL MEDICINE

## 2017-06-12 PROCEDURE — 88305 TISSUE EXAM BY PATHOLOGIST: CPT | Mod: 26 | Performed by: INTERNAL MEDICINE

## 2017-06-12 PROCEDURE — 80053 COMPREHEN METABOLIC PANEL: CPT | Performed by: HOSPITALIST

## 2017-06-12 PROCEDURE — 88305 TISSUE EXAM BY PATHOLOGIST: CPT | Performed by: INTERNAL MEDICINE

## 2017-06-12 PROCEDURE — A9270 NON-COVERED ITEM OR SERVICE: HCPCS | Mod: GY | Performed by: INTERNAL MEDICINE

## 2017-06-12 PROCEDURE — BD41ZZZ ULTRASONOGRAPHY OF ESOPHAGUS: ICD-10-PCS | Performed by: INTERNAL MEDICINE

## 2017-06-12 PROCEDURE — 12000000 ZZH R&B MED SURG/OB

## 2017-06-12 PROCEDURE — 0DB58ZX EXCISION OF ESOPHAGUS, VIA NATURAL OR ARTIFICIAL OPENING ENDOSCOPIC, DIAGNOSTIC: ICD-10-PCS | Performed by: INTERNAL MEDICINE

## 2017-06-12 PROCEDURE — 99207 ZZC CDG-MDM COMPONENT: MEETS MODERATE - UP CODED: CPT | Performed by: HOSPITALIST

## 2017-06-12 PROCEDURE — BF4CZZZ ULTRASONOGRAPHY OF HEPATOBILIARY SYSTEM, ALL: ICD-10-PCS | Performed by: INTERNAL MEDICINE

## 2017-06-12 PROCEDURE — 88312 SPECIAL STAINS GROUP 1: CPT | Mod: 26 | Performed by: INTERNAL MEDICINE

## 2017-06-12 PROCEDURE — 88312 SPECIAL STAINS GROUP 1: CPT | Performed by: INTERNAL MEDICINE

## 2017-06-12 PROCEDURE — 85384 FIBRINOGEN ACTIVITY: CPT | Performed by: HOSPITALIST

## 2017-06-12 PROCEDURE — 25000132 ZZH RX MED GY IP 250 OP 250 PS 637: Mod: GY | Performed by: HOSPITALIST

## 2017-06-12 PROCEDURE — 36415 COLL VENOUS BLD VENIPUNCTURE: CPT | Performed by: HOSPITALIST

## 2017-06-12 PROCEDURE — 85025 COMPLETE CBC W/AUTO DIFF WBC: CPT | Performed by: HOSPITALIST

## 2017-06-12 PROCEDURE — 99233 SBSQ HOSP IP/OBS HIGH 50: CPT | Performed by: HOSPITALIST

## 2017-06-12 PROCEDURE — 25000132 ZZH RX MED GY IP 250 OP 250 PS 637: Mod: GY | Performed by: PSYCHIATRY & NEUROLOGY

## 2017-06-12 RX ORDER — LIDOCAINE 40 MG/G
CREAM TOPICAL
Status: DISCONTINUED | OUTPATIENT
Start: 2017-06-12 | End: 2017-06-12

## 2017-06-12 RX ORDER — TEMAZEPAM 7.5 MG/1
7.5 CAPSULE ORAL
Status: DISCONTINUED | OUTPATIENT
Start: 2017-06-12 | End: 2017-06-15

## 2017-06-12 RX ORDER — TEMAZEPAM 7.5 MG/1
15 CAPSULE ORAL
Status: DISCONTINUED | OUTPATIENT
Start: 2017-06-12 | End: 2017-06-12

## 2017-06-12 RX ORDER — SUCRALFATE ORAL 1 G/10ML
1 SUSPENSION ORAL
Status: DISCONTINUED | OUTPATIENT
Start: 2017-06-12 | End: 2017-06-16 | Stop reason: HOSPADM

## 2017-06-12 RX ORDER — TEMAZEPAM 7.5 MG/1
7.5 CAPSULE ORAL ONCE
Status: COMPLETED | OUTPATIENT
Start: 2017-06-12 | End: 2017-06-12

## 2017-06-12 RX ADMIN — THERA TABS 1 TABLET: TAB at 09:35

## 2017-06-12 RX ADMIN — POTASSIUM CHLORIDE 20 MEQ: 1.5 POWDER, FOR SOLUTION ORAL at 09:36

## 2017-06-12 RX ADMIN — VORTIOXETINE 5 MG: 5 TABLET, FILM COATED ORAL at 09:35

## 2017-06-12 RX ADMIN — CLINDAMYCIN HYDROCHLORIDE 300 MG: 300 CAPSULE ORAL at 18:29

## 2017-06-12 RX ADMIN — SUCRALFATE 1 G: 1 SUSPENSION ORAL at 22:09

## 2017-06-12 RX ADMIN — GABAPENTIN 600 MG: 600 TABLET, FILM COATED ORAL at 22:09

## 2017-06-12 RX ADMIN — GABAPENTIN 300 MG: 300 CAPSULE ORAL at 09:34

## 2017-06-12 RX ADMIN — FOLIC ACID 1 MG: 1 TABLET ORAL at 09:35

## 2017-06-12 RX ADMIN — MIRTAZAPINE 45 MG: 15 TABLET, FILM COATED ORAL at 22:09

## 2017-06-12 RX ADMIN — TEMAZEPAM 7.5 MG: 7.5 CAPSULE ORAL at 01:30

## 2017-06-12 RX ADMIN — RANITIDINE 150 MG: 150 TABLET ORAL at 22:09

## 2017-06-12 RX ADMIN — MAGNESIUM OXIDE TAB 400 MG (241.3 MG ELEMENTAL MG) 400 MG: 400 (241.3 MG) TAB at 09:35

## 2017-06-12 RX ADMIN — RANITIDINE 150 MG: 150 TABLET ORAL at 09:35

## 2017-06-12 RX ADMIN — CLINDAMYCIN HYDROCHLORIDE 300 MG: 300 CAPSULE ORAL at 06:10

## 2017-06-12 RX ADMIN — Medication 12.5 MG: at 09:36

## 2017-06-12 RX ADMIN — Medication 100 MG: at 09:35

## 2017-06-12 RX ADMIN — TRAMADOL HYDROCHLORIDE 50 MG: 50 TABLET, COATED ORAL at 09:43

## 2017-06-12 RX ADMIN — TRAZODONE HYDROCHLORIDE 100 MG: 100 TABLET ORAL at 22:09

## 2017-06-12 NOTE — PLAN OF CARE
Problem: Goal Outcome Summary  Goal: Goal Outcome Summary  PT: Attempted PT session. Pt off the unit at [facility].

## 2017-06-12 NOTE — PROGRESS NOTES
[facility]  Gastroenterology Progress Note     Kavitha Headley MRN# [id number]   YOB: 1945 Age: 71 year old          Assessment and Plan:     Cellulitis   Clinically unchanged. Awaiting EGD and EUS.  Plt down to 54. LFTs stable  Malnutrition.  Likely cirrhosis with acute ETOH hepatitis.              Cellulitis of right lower extremity  Hypokalemia  Elevated LFTs      Interval History:   doing well; no cp, sob, n/v/d, or abd pain.              Review of Systems:   C: NEGATIVE for fever, chills, change in weight  E/M: NEGATIVE for ear, mouth and throat problems  R: NEGATIVE for significant cough or SOB  CV: NEGATIVE for chest pain, palpitations or peripheral edema             Medications:   I have reviewed this patient's current medications    sodium chloride (PF)  3 mL Intravenous Q8H     sodium chloride (PF)  3 mL Intravenous Q8H     sucralfate  1 g Oral 4x Daily AC & HS     ranitidine  150 mg Oral BID     magnesium oxide  400 mg Oral Daily     vitamin  B-1  100 mg Oral Daily     vortioxetine  5 mg Oral Daily     clindamycin  300 mg Oral Q8H AMOR     metoprolol  12.5 mg Oral Daily     gabapentin (NEURONTIN) tablet 600 mg  600 mg Oral At Bedtime     gabapentin (NEURONTIN) capsule 300 mg  300 mg Oral Daily     mirtazapine  45 mg Oral At Bedtime     multivitamin, therapeutic  1 tablet Oral Daily     traZODone  100 mg Oral At Bedtime     sodium chloride (PF)  3 mL Intracatheter Q8H     folic acid  1 mg Oral Daily                  Physical Exam:   Vitals were reviewed  Vital Signs with Ranges  Temp:  [98.5  F (36.9  C)-98.8  F (37.1  C)] 98.5  F (36.9  C)  Pulse:  [53] 53  Heart Rate:  [78-91] 78  Resp:  [11-18] 11  BP: ()/(66-87) 95/66  SpO2:  [92 %-100 %] 98 %  I/O last 3 completed shifts:  In: -   Out: 500 [Urine:500]  Constitutional: healthy, alert and no distress   Cardiovascular: negative, PMI normal. No lifts, heaves, or thrills. RRR. No murmurs, clicks gallops or  rub  Respiratory: negative, Percussion normal. Good diaphragmatic excursion. Lungs clear  Head: Normocephalic. No masses, lesions, tenderness or abnormalities  Neck: Neck supple. No adenopathy. Thyroid symmetric, normal size,, Carotids without bruits.  Abdomen: Abdomen soft, non-tender. BS normal. No masses, organomegaly  SKIN: no suspicious lesions or rashes.Jaundice, cellulitis better.           Data:   I reviewed the patient's new clinical lab test results.   Recent Labs   Lab Test  06/12/17   0730  06/11/17   1540  06/11/17   0910  06/10/17   0823  06/09/17   0745   06/07/17   0720   WBC  5.9   --    --   7.2  7.4   < >  6.2   HGB  11.3*   --    --   11.3*  12.1   < >  11.3*   MCV  100   --    --   100  102*   < >  99   PLT  51*   --   65*  77*  118*   < >  166   INR   --   1.15*   --   1.19*   --    --   1.02    < > = values in this interval not displayed.     Recent Labs   Lab Test  06/12/17   0730  06/11/17   0910  06/10/17   0823   POTASSIUM  4.5  4.4  3.8   CHLORIDE  98  95  92*   CO2  32  32  31   BUN  12  15  20   ANIONGAP  3  7  8     Recent Labs   Lab Test  06/12/17   0730  06/11/17   0910  06/10/17   0823  06/09/17   1335   06/06/17   2300   10/19/15   1447   11/06/10   1415   ALBUMIN  2.1*  2.3*  2.0*   --    < >   --    < >   --    < >  3.7   BILITOTAL  5.2*  5.1*  4.2*   --    < >   --    < >   --    < >  0.5   ALT  18  20  20   --    < >   --    < >   --    < >  35   AST  54*  68*  66*   --    < >   --    < >   --    < >  60*   PROTEIN   --    --    --   10*   --   Negative   --   Negative   < >   --    LIPASE   --    --    --    --    --    --    --    --    --   139    < > = values in this interval not displayed.       I reviewed the patient's new imaging results.    All laboratory data reviewed  All imaging studies reviewed by me.    Parth Graham MD,  6/12/2017  [facility]  Office : [phone]  Cell: [phone]

## 2017-06-12 NOTE — PROVIDER NOTIFICATION
MD Notification    Notified Person:  MD    Notified Persons Name: Dr. Mckeon    Notification Date/Time: 6/12/17 @0042    Notification Interaction:  Talked with Physician    Purpose of Notification: Pt having uncontrolled back pain/spasms, and wants something more for pain and something more to help her sleep.    Orders Received: Give another dose of Temazapam 7.5mg now and start 15mg at bedtime PRN tonight.    Comments:

## 2017-06-12 NOTE — PROGRESS NOTES
"[facility]  Infectious Disease Progress Note          Assessment and Plan:   IMPRESSION:   1.  Kavitha Headley is a 71-year-old female with history of morbid obesity.  Status post gastric bypass surgery.   2.  History of alcohol abuse.   3.  Chronic pain syndrome.   4.  Admitted with a diagnosis of bilateral lower leg cellulitis with failed outpatient antibiotic therapy.  I suspect that this more likely represents venous stasis disease of the legs rather than cellulitis given the bilateral nature of the redness, as well as absence of fever and leukocytosis.  5.  Sulfa allergy.   6. Liver failure      RECOMMENDATIONS:   1.  finish last couple days of PO clinda  2. Liver w/u as per GI [facility] Hosp  3. We will sign off.  Thanks.        Interval History:   Legs feel better.  Afebrile.  Leg erythema resolved.  Note elevated bili.  Now on PO clinda.              Medications:       ranitidine  150 mg Oral BID     magnesium oxide  400 mg Oral Daily     vitamin  B-1  100 mg Oral Daily     potassium chloride  20 mEq Oral BID     vortioxetine  5 mg Oral Daily     clindamycin  300 mg Oral Q8H AMOR     metoprolol  12.5 mg Oral Daily     gabapentin (NEURONTIN) tablet 600 mg  600 mg Oral At Bedtime     gabapentin (NEURONTIN) capsule 300 mg  300 mg Oral Daily     mirtazapine  45 mg Oral At Bedtime     multivitamin, therapeutic  1 tablet Oral Daily     traZODone  100 mg Oral At Bedtime     sodium chloride (PF)  3 mL Intracatheter Q8H     folic acid  1 mg Oral Daily                  Physical Exam:   Blood pressure 126/87, pulse 90, temperature 98.8  F (37.1  C), temperature source Oral, resp. rate 16, height 1.626 m (5' 4\"), weight 88.5 kg (195 lb), SpO2 97 %, not currently breastfeeding.  [unfilled]  Vital Signs with Ranges  Temp:  [97.9  F (36.6  C)-99.4  F (37.4  C)] 98.8  F (37.1  C)  Heart Rate:  [] 91  Resp:  [16] 16  BP: (114-136)/(75-91) 126/87  SpO2:  [94 %-98 %] 97 %    Constitutional: Awake, alert, " cooperative, no apparent distress   Lungs: Clear to auscultation bilaterally, no crackles or wheezing   Cardiovascular: Regular rate and rhythm, normal S1 and S2, and no murmur noted   Abdomen: Normal bowel sounds, soft, non-distended, non-tender   Skin: No rashes, no cyanosis, no edema   Other: Leg erythema  resolved.          Data:   All microbiology laboratory data reviewed.  Recent Labs   Lab Test  06/11/17   0910  06/10/17   0823  06/09/17   0745  06/08/17   0746   WBC   --   7.2  7.4  6.3   HGB   --   11.3*  12.1  11.1*   HCT   --   30.8*  33.6*  30.2*   MCV   --   100  102*  100   PLT  65*  77*  118*  143*     Recent Labs   Lab Test  06/11/17   0910  06/10/17   0823  06/09/17   0745   CR  0.85  1.04  1.42*     Recent Labs   Lab Test  04/23/12   1335   SED  34*

## 2017-06-12 NOTE — PLAN OF CARE
Problem: Goal Outcome Summary  Goal: Goal Outcome Summary  Outcome: No Change  A&Ox4.  VSS, on RA.  Up w/ asst 1 and walker.  BLE millicent, 1-2+ edema, improving.  CIWAs minimal, no Ativan given.  C/o back pain 5/10, PRN ultram x1.  PRN Restoril at bedtime.  Skin jaundice.  Nephrology and GI following.  NPO at midnight for EGD and EUS in am.  D/C 2-3 days to TCU, nursing will continue to monitor.

## 2017-06-12 NOTE — PROGRESS NOTES
[facility]    Internal Medicine Hospitalist Progress Note  06/12/2017  I evaluated patient on the above date.    Assessment & Plan    Ms. Kavitha Hartman is a pleasant 71-year-old female with past medical history including HTN, DLD, mod AS, dep/anx, EtOH abuse, chronic back pain, recent LE cellulitis ond oral antibiotics, who presented 6/6 with worsening LE erythema and swelling.     BLE cellulitis vs worsening chronic venous stasis:  Recently treated for cellulitis with abx including clindamycin, then switched to minocycline and Keflex (due to intolerance).  - On initial eval 6/6, pt afebrile, nl WBC. BLE US 6/6 negative. Started on IV vanco 6/6. Given a dose of IV Lasix 6/6.  - Improved 6/7. Procal 1.99 6/7. Seen by ID and abx changed to IV cefazolin 6/7 which was stopped 6/9  - Started clindamycin (Keflex recommended by ID, but given possible affect on LFT's /possible side effect of cholestatic jaundice, favored another option by ID, appreciate ID assistance), complete course of clindamycin 6/13  - Keep BLE's elevated.  - Monitor cultures, NGTD  - Management of LE edema as below.     Abnormal liver function tests, suspect from acute EtOH hepatitis, possible chronic liver disease/early cirrhosis, ?acute worsening from meds: On admission with with elevated , , total bilirubin 2.3. Statin held on admission. Abd US 6/7 showed no acute findings and no nodular architecture but has fattly liver infiltration.  - Overall, suspect related to her daily alcohol drinking, but given cholestatic picture, concern if due to meds, vs autoimmune vs obstructive  - Has been on cefazolin, which could affect LFT's.  - Hepatitis panel negative, autoimmune serologies pending, off cefazolin as above.  - Abstinence from EtOH recommended.  - Holding PTA statin.  - LFTs slightly improved initially and bili up and remains elevated.  - discussed with Dr Graham, s/p EGD and EUS today- friable distal  esophagus-biopsied, and normal IHBD, CBD not well visualized. No obvious pancreatic mass. To send viral serology for CMV and HSV. Possibly MRCP if Bili not improving.     Worsened chronic LE edema: PTA on PRN Lasix 20 mg daily. No signs of pulm edema or other signs of CHF. Likely due to underlying liver disease and low albumin.  * Given IV Lasix 6/6 as above.  * Leg swelling improved 6/7. Echo 6/7 showed LVEF >70%, suggestion of impaired LV relaxation, mod AS, ascending aorta is mildly dilated.  - Given worsened Cr and hyponatremia, Lasix held.  - Continue leg elevation.  - Edema has worsened with IVF, IVF stopped 6/11.  - nutrition consult, nutritional supplement. Needs follow up.     Acute kidney injury, stage 1  - Cr normal on admission, baseline 0.8-1, but increased upto 1.4  - Fto be prerenal, Nephrology consulted, was maintained on NS, and now Cr better to baseline.  - Monitor BMP.  - Avoid nephrotoxic medications.     Hyponatremia, possibly nutrional or related to volume overload, ?due to liver disease.  - Na 129 on admission 6/6. Given Lasix 6/6.  - Hyponatremia workup labs not suggestive of SIADH, no overt CHF/volume overload  - tolerated IV fluid as above, and sodium 133 today  - appreciate nephrology assistance, has signed off.     Thrombocytopenia.  * Unclear etiology; possibly from liver disease- though normal at presentation, medication effect or from other cause including low grade DIC.  - Platelet count continues to drop, 166-->143-->118-->77-->65-->51  - Lovenox discontinued.  - Consider platelet transfusion if needs a procedure and less than 50,000; or if less than 10,000.  - HIT panel pending  - Has elevated dimer, low fibrinogen, mildly elevated INR, concern for DIC vs again due to liver disease, will ask hematology to eval for any further recommendation     EtOH abuse: She reported drinking 2-3 cocktails per night. She denied having history of alcohol withdrawal in the past.    - Her blood work  "here suggests chronic alcoholic use with hyponatremia, hypokalemia, and elevated liver function tests; also fatty liver on US. INR normal.  - More tremulous 6/8, started on CIWA 6/8.  - Stable 6/9 and 6/10 though tremulous and unsteady.  - On CIWA protocol.  - Abstinence from EtOH recommended.     Hypokalemia, could be due nutritional or related to PRN Lasix at home.  - on K replacement protocol.     Moderate aortic stenosis.  * Her last echocardiogram was in 08/2015.  Follows with [facility].  * Echo 6/7 showed LVEF >70%, suggestion of impaired LV relaxation, mod AS, ascending aorta is mildly dilated.  - Monitor i/o's, daily wts.  - F/u Cardiologist for further monitoring.     Hypertension (benign essential).  [PTA: metoprolol XL 25 mg daily.]  - Continue metoprolol XL.     Dyslipidemia.  - Holding atorvastatin as above.     Depression/anxiety.   * Psychiatry consulted and started Trintellix and Seroquel 6/9.  - Hold on starting Seroquel due to rising LFT's.  - Continue Remeron and trazodone.  - Apprec help from Dr. Vargas     Insomnia.  - Continue PRN temazepam.     Chronic back pain.  * On narcotic agreement.  - Continue gabapentin, PTA PRN Ultram 50 mg BID.     Prophylaxis.  - PCD's. lovenox stopped due to thrombocytopenia  - PPI discontinued given thrombocytopenia, ranitidine.    CODE status: Full  Unable to reach daughter for update. Discussed with RN.    Interval History      C/o suprapubic discomfort but no nausea, diarrhea burning urination.  -Feels tired, worried that she has not been able to get up to do much activity.  -c/o back pain, mid thoracic region, non radiating.    -Data reviewed today: I reviewed all new labs over the last 24 hours. I personally reviewed no images or EKG's today.    Physical Exam   Heart Rate: 70, Blood pressure (!) 84/76, pulse 53, temperature 98.5  F (36.9  C), temperature source Oral, resp. rate (!) 7, height 1.626 m (5' 4\"), weight 88.5 kg (195 lb), SpO2 99 %, not " currently breastfeeding.  Vitals:    06/10/17 0617 06/11/17 0653 06/12/17 0618   Weight: 86.2 kg (190 lb 0.6 oz) 86.5 kg (190 lb 11.2 oz) 88.5 kg (195 lb)     Vital Signs with Ranges  Temp:  [98.5  F (36.9  C)-98.8  F (37.1  C)] 98.5  F (36.9  C)  Pulse:  [53] 53  Heart Rate:  [69-91] 70  Resp:  [7-18] 7  BP: ()/(63-87) 84/76  SpO2:  [92 %-100 %] 99 %  Patient Vitals for the past 24 hrs:   BP Temp Temp src Pulse Heart Rate Resp SpO2 Weight   06/12/17 1710 (!) 84/76 - - - 70 (!) 7 99 % -   06/12/17 1700 92/63 - - - 69 8 98 % -   06/12/17 1652 92/76 - - - 73 11 99 % -   06/12/17 1648 - - - - 78 11 98 % -   06/12/17 1645 95/66 - - - - - 100 % -   06/12/17 0936 - - - - 80 - - -   06/12/17 0851 120/73 98.5  F (36.9  C) Oral 53 - 18 92 % -   06/12/17 0618 - - - - - - - 88.5 kg (195 lb)   06/11/17 2245 - - - - - 16 - -   06/11/17 2151 126/87 98.8  F (37.1  C) Oral - 91 16 97 % -     I/O's Last 24 hours  I/O last 3 completed shifts:  In: -   Out: 500 [Urine:500]    Constitutional: Jaundiced. Alert, oriented, pleasant.  HEENT: PERRLA, EOMI, mild icterus noted. Face is puffy.  Respiratory: Clear, no crackles/wheezes. Normal work of breathing. Remains on room air.  Cardiovascular: RRR, +I-II/VI sys murmur, no tachycardia  GI: Soft, mild suprapubic tenderness (+), BS (+). Areas of ecchymosis over the abd wall  Skin/Integumen: BLE edema noted, no redness noted.    Neuro: AAOx3, follows verbal commands appropriately. CN 2-12 intact.       Data     Recent Labs  Lab 06/12/17  0730 06/11/17  1540 06/11/17  0910 06/10/17  0823 06/09/17  0745  06/07/17  0720   WBC 5.9  --   --  7.2 7.4  < > 6.2   HGB 11.3*  --   --  11.3* 12.1  < > 11.3*     --   --  100 102*  < > 99   PLT 51*  --  65* 77* 118*  < > 166   INR  --  1.15*  --  1.19*  --   --  1.02     --  134 131* 126*  < > 130*   POTASSIUM 4.5  --  4.4 3.8 4.4  < > 2.8*   CHLORIDE 98  --  95 92* 84*  < > 85*   CO2 32  --  32 31 34*  < > 36*   BUN 12  --  15 20 26  <  > 18   CR 0.92  --  0.85 1.04 1.42*  < > 0.88   ANIONGAP 3  --  7 8 8  < > 9   BIRGIT 7.7*  --  7.9* 7.8* 8.5  < > 7.9*   GLC 75  --  75 75 88  < > 66*   ALBUMIN 2.1*  --  2.3* 2.0* 2.4*  < > 2.2*   PROTTOTAL 5.0*  --  5.9* 5.0* 5.6*  < > 5.0*   BILITOTAL 5.2*  --  5.1* 4.2* 5.4*  < > 2.4*   ALKPHOS 227*  --  257* 215* 219*  < > 173*   ALT 18  --  20 20 48  < > 115*   AST 54*  --  68* 66* 110*  < > 212*   < > = values in this interval not displayed.  Recent Labs   Lab Test  06/12/17   0730  06/11/17   0910  06/10/17   0823  06/09/17   0745  06/08/17   0746   04/11/12   1214  04/11/12   0649   GLC  75  75  75  88  64*   < >   --    --    BGM   --    --    --    --    --    --   134*  112*    < > = values in this interval not displayed.       No results found for this or any previous visit (from the past 24 hour(s)).    Medications   All medications were reviewed.    - MEDICATION INSTRUCTIONS -       - MEDICATION INSTRUCTIONS -         sodium chloride (PF)  3 mL Intravenous Q8H     sodium chloride (PF)  3 mL Intravenous Q8H     sucralfate  1 g Oral 4x Daily AC & HS     ranitidine  150 mg Oral BID     magnesium oxide  400 mg Oral Daily     vitamin  B-1  100 mg Oral Daily     vortioxetine  5 mg Oral Daily     clindamycin  300 mg Oral Q8H [facility]     metoprolol  12.5 mg Oral Daily     gabapentin (NEURONTIN) tablet 600 mg  600 mg Oral At Bedtime     gabapentin (NEURONTIN) capsule 300 mg  300 mg Oral Daily     mirtazapine  45 mg Oral At Bedtime     multivitamin, therapeutic  1 tablet Oral Daily     traZODone  100 mg Oral At Bedtime     sodium chloride (PF)  3 mL Intracatheter Q8H     folic acid  1 mg Oral Daily

## 2017-06-13 ENCOUNTER — APPOINTMENT (OUTPATIENT)
Dept: PHYSICAL THERAPY | Facility: CLINIC | Age: 72
DRG: 432 | End: 2017-06-13
Payer: MEDICARE

## 2017-06-13 LAB
ALBUMIN SERPL-MCNC: 2.4 G/DL (ref 3.4–5)
ALBUMIN UR-MCNC: 30 MG/DL
ALP SERPL-CCNC: 262 U/L (ref 40–150)
ALT SERPL W P-5'-P-CCNC: 22 U/L (ref 0–50)
AMMONIA PLAS-SCNC: 45 UMOL/L (ref 10–50)
ANION GAP SERPL CALCULATED.3IONS-SCNC: 4 MMOL/L (ref 3–14)
APPEARANCE UR: ABNORMAL
AST SERPL W P-5'-P-CCNC: 71 U/L (ref 0–45)
BASOPHILS # BLD AUTO: 0.1 10E9/L (ref 0–0.2)
BASOPHILS NFR BLD AUTO: 1 %
BILIRUB SERPL-MCNC: 8.8 MG/DL (ref 0.2–1.3)
BILIRUB UR QL STRIP: ABNORMAL
BUN SERPL-MCNC: 13 MG/DL (ref 7–30)
CALCIUM SERPL-MCNC: 8.4 MG/DL (ref 8.5–10.1)
CAOX CRY #/AREA URNS HPF: ABNORMAL /HPF
CHLORIDE SERPL-SCNC: 96 MMOL/L (ref 94–109)
CO2 SERPL-SCNC: 32 MMOL/L (ref 20–32)
COLOR UR AUTO: ABNORMAL
COPATH REPORT: NORMAL
CREAT SERPL-MCNC: 1 MG/DL (ref 0.52–1.04)
CREAT SERPL-MCNC: 1.05 MG/DL (ref 0.52–1.04)
CREAT UR-MCNC: 177 MG/DL
DIFFERENTIAL METHOD BLD: ABNORMAL
EOSINOPHIL # BLD AUTO: 0.3 10E9/L (ref 0–0.7)
EOSINOPHIL NFR BLD AUTO: 4.1 %
ERYTHROCYTE [DISTWIDTH] IN BLOOD BY AUTOMATED COUNT: 15.6 % (ref 10–15)
FRACT EXCRET NA UR+SERPL-RTO: NORMAL %
GFR SERPL CREATININE-BSD FRML MDRD: 51 ML/MIN/1.7M2
GFR SERPL CREATININE-BSD FRML MDRD: 54 ML/MIN/1.7M2
GLUCOSE SERPL-MCNC: 88 MG/DL (ref 70–99)
GLUCOSE UR STRIP-MCNC: NEGATIVE MG/DL
HCT VFR BLD AUTO: 33.1 % (ref 35–47)
HGB BLD-MCNC: 11.7 G/DL (ref 11.7–15.7)
HGB UR QL STRIP: NEGATIVE
HYALINE CASTS #/AREA URNS LPF: 3 /LPF (ref 0–2)
IMM GRANULOCYTES # BLD: 0 10E9/L (ref 0–0.4)
IMM GRANULOCYTES NFR BLD: 0.4 %
KETONES UR STRIP-MCNC: NEGATIVE MG/DL
LEUKOCYTE ESTERASE UR QL STRIP: ABNORMAL
LYMPHOCYTES # BLD AUTO: 1.7 10E9/L (ref 0.8–5.3)
LYMPHOCYTES NFR BLD AUTO: 23.4 %
MCH RBC QN AUTO: 36.2 PG (ref 26.5–33)
MCHC RBC AUTO-ENTMCNC: 35.3 G/DL (ref 31.5–36.5)
MCV RBC AUTO: 103 FL (ref 78–100)
MONOCYTES # BLD AUTO: 1 10E9/L (ref 0–1.3)
MONOCYTES NFR BLD AUTO: 13.7 %
MUCOUS THREADS #/AREA URNS LPF: PRESENT /LPF
NEUTROPHILS # BLD AUTO: 4.1 10E9/L (ref 1.6–8.3)
NEUTROPHILS NFR BLD AUTO: 57.4 %
NITRATE UR QL: NEGATIVE
NRBC # BLD AUTO: 0 10*3/UL
NRBC BLD AUTO-RTO: 0 /100
PH UR STRIP: 6 PH (ref 5–7)
PLATELET # BLD AUTO: 64 10E9/L (ref 150–450)
PLATELET # BLD EST: ABNORMAL 10*3/UL
POTASSIUM SERPL-SCNC: 4.7 MMOL/L (ref 3.4–5.3)
PROT SERPL-MCNC: 5.7 G/DL (ref 6.8–8.8)
RBC # BLD AUTO: 3.23 10E12/L (ref 3.8–5.2)
RBC #/AREA URNS AUTO: 2 /HPF (ref 0–2)
RETICS # AUTO: 95.6 10E9/L (ref 25–95)
RETICS/RBC NFR AUTO: 3 % (ref 0.5–2)
SODIUM SERPL-SCNC: 131 MMOL/L (ref 133–144)
SODIUM SERPL-SCNC: 132 MMOL/L (ref 133–144)
SODIUM UR-SCNC: 6 MMOL/L
SP GR UR STRIP: 1.02 (ref 1–1.03)
SQUAMOUS #/AREA URNS AUTO: 4 /HPF (ref 0–1)
UPPER EUS: NORMAL
URN SPEC COLLECT METH UR: ABNORMAL
UROBILINOGEN UR STRIP-MCNC: NORMAL MG/DL (ref 0–2)
WBC # BLD AUTO: 7.2 10E9/L (ref 4–11)
WBC #/AREA URNS AUTO: 6 /HPF (ref 0–2)

## 2017-06-13 PROCEDURE — 84300 ASSAY OF URINE SODIUM: CPT | Performed by: INTERNAL MEDICINE

## 2017-06-13 PROCEDURE — 97116 GAIT TRAINING THERAPY: CPT | Mod: GP | Performed by: PHYSICAL THERAPY ASSISTANT

## 2017-06-13 PROCEDURE — 86665 EPSTEIN-BARR CAPSID VCA: CPT | Performed by: INTERNAL MEDICINE

## 2017-06-13 PROCEDURE — 85060 BLOOD SMEAR INTERPRETATION: CPT | Performed by: HOSPITALIST

## 2017-06-13 PROCEDURE — A9270 NON-COVERED ITEM OR SERVICE: HCPCS | Mod: GY | Performed by: INTERNAL MEDICINE

## 2017-06-13 PROCEDURE — 80053 COMPREHEN METABOLIC PANEL: CPT | Performed by: HOSPITALIST

## 2017-06-13 PROCEDURE — 85025 COMPLETE CBC W/AUTO DIFF WBC: CPT | Performed by: HOSPITALIST

## 2017-06-13 PROCEDURE — 86694 HERPES SIMPLEX NES ANTBDY: CPT | Performed by: HOSPITALIST

## 2017-06-13 PROCEDURE — 40000847 ZZHCL STATISTIC MORPHOLOGY W/INTERP HISTOLOGY TC 85060: Performed by: HOSPITALIST

## 2017-06-13 PROCEDURE — P9047 ALBUMIN (HUMAN), 25%, 50ML: HCPCS | Performed by: HOSPITALIST

## 2017-06-13 PROCEDURE — 99233 SBSQ HOSP IP/OBS HIGH 50: CPT | Performed by: HOSPITALIST

## 2017-06-13 PROCEDURE — 86645 CMV ANTIBODY IGM: CPT | Performed by: HOSPITALIST

## 2017-06-13 PROCEDURE — 40000193 ZZH STATISTIC PT WARD VISIT: Performed by: PHYSICAL THERAPY ASSISTANT

## 2017-06-13 PROCEDURE — 25000132 ZZH RX MED GY IP 250 OP 250 PS 637: Mod: GY | Performed by: INTERNAL MEDICINE

## 2017-06-13 PROCEDURE — 82565 ASSAY OF CREATININE: CPT | Performed by: HOSPITALIST

## 2017-06-13 PROCEDURE — 25000132 ZZH RX MED GY IP 250 OP 250 PS 637: Mod: GY | Performed by: HOSPITALIST

## 2017-06-13 PROCEDURE — 36415 COLL VENOUS BLD VENIPUNCTURE: CPT | Performed by: HOSPITALIST

## 2017-06-13 PROCEDURE — 12000000 ZZH R&B MED SURG/OB

## 2017-06-13 PROCEDURE — A9270 NON-COVERED ITEM OR SERVICE: HCPCS | Mod: GY | Performed by: HOSPITALIST

## 2017-06-13 PROCEDURE — 25000128 H RX IP 250 OP 636: Performed by: HOSPITALIST

## 2017-06-13 PROCEDURE — 84295 ASSAY OF SERUM SODIUM: CPT | Performed by: HOSPITALIST

## 2017-06-13 PROCEDURE — 86665 EPSTEIN-BARR CAPSID VCA: CPT | Performed by: HOSPITALIST

## 2017-06-13 PROCEDURE — 82570 ASSAY OF URINE CREATININE: CPT | Performed by: INTERNAL MEDICINE

## 2017-06-13 PROCEDURE — 82140 ASSAY OF AMMONIA: CPT | Performed by: HOSPITALIST

## 2017-06-13 PROCEDURE — A9270 NON-COVERED ITEM OR SERVICE: HCPCS | Mod: GY | Performed by: PSYCHIATRY & NEUROLOGY

## 2017-06-13 PROCEDURE — 99233 SBSQ HOSP IP/OBS HIGH 50: CPT | Performed by: INTERNAL MEDICINE

## 2017-06-13 PROCEDURE — 25000132 ZZH RX MED GY IP 250 OP 250 PS 637: Mod: GY | Performed by: PSYCHIATRY & NEUROLOGY

## 2017-06-13 PROCEDURE — 81001 URINALYSIS AUTO W/SCOPE: CPT | Performed by: INTERNAL MEDICINE

## 2017-06-13 PROCEDURE — 36415 COLL VENOUS BLD VENIPUNCTURE: CPT | Performed by: INTERNAL MEDICINE

## 2017-06-13 PROCEDURE — 85045 AUTOMATED RETICULOCYTE COUNT: CPT | Performed by: HOSPITALIST

## 2017-06-13 RX ORDER — FUROSEMIDE 10 MG/ML
20 INJECTION INTRAMUSCULAR; INTRAVENOUS
Status: COMPLETED | OUTPATIENT
Start: 2017-06-13 | End: 2017-06-14

## 2017-06-13 RX ORDER — LACTULOSE 10 G/15ML
10 SOLUTION ORAL 2 TIMES DAILY
Status: DISCONTINUED | OUTPATIENT
Start: 2017-06-13 | End: 2017-06-16 | Stop reason: HOSPADM

## 2017-06-13 RX ORDER — ALBUMIN (HUMAN) 12.5 G/50ML
25 SOLUTION INTRAVENOUS 2 TIMES DAILY
Status: COMPLETED | OUTPATIENT
Start: 2017-06-13 | End: 2017-06-14

## 2017-06-13 RX ADMIN — RANITIDINE 150 MG: 150 TABLET ORAL at 20:35

## 2017-06-13 RX ADMIN — THERA TABS 1 TABLET: TAB at 08:59

## 2017-06-13 RX ADMIN — TEMAZEPAM 7.5 MG: 7.5 CAPSULE ORAL at 22:36

## 2017-06-13 RX ADMIN — SUCRALFATE 1 G: 1 SUSPENSION ORAL at 12:07

## 2017-06-13 RX ADMIN — RANITIDINE 150 MG: 150 TABLET ORAL at 08:58

## 2017-06-13 RX ADMIN — FUROSEMIDE 20 MG: 10 INJECTION, SOLUTION INTRAVENOUS at 14:25

## 2017-06-13 RX ADMIN — CLINDAMYCIN HYDROCHLORIDE 300 MG: 300 CAPSULE ORAL at 22:33

## 2017-06-13 RX ADMIN — CLINDAMYCIN HYDROCHLORIDE 300 MG: 300 CAPSULE ORAL at 11:04

## 2017-06-13 RX ADMIN — SUCRALFATE 1 G: 1 SUSPENSION ORAL at 22:33

## 2017-06-13 RX ADMIN — Medication 12.5 MG: at 08:58

## 2017-06-13 RX ADMIN — CLINDAMYCIN HYDROCHLORIDE 300 MG: 300 CAPSULE ORAL at 01:37

## 2017-06-13 RX ADMIN — GABAPENTIN 600 MG: 600 TABLET, FILM COATED ORAL at 22:33

## 2017-06-13 RX ADMIN — LACTULOSE 10 G: 10 SOLUTION ORAL at 09:00

## 2017-06-13 RX ADMIN — MICONAZOLE NITRATE: 2 POWDER TOPICAL at 22:33

## 2017-06-13 RX ADMIN — SUCRALFATE 1 G: 1 SUSPENSION ORAL at 06:37

## 2017-06-13 RX ADMIN — TRAZODONE HYDROCHLORIDE 100 MG: 100 TABLET ORAL at 22:33

## 2017-06-13 RX ADMIN — ALBUMIN (HUMAN) 25 G: 12.5 SOLUTION INTRAVENOUS at 16:15

## 2017-06-13 RX ADMIN — MIRTAZAPINE 45 MG: 15 TABLET, FILM COATED ORAL at 22:33

## 2017-06-13 RX ADMIN — VORTIOXETINE 5 MG: 5 TABLET, FILM COATED ORAL at 08:58

## 2017-06-13 RX ADMIN — GABAPENTIN 300 MG: 300 CAPSULE ORAL at 08:57

## 2017-06-13 RX ADMIN — LACTULOSE 10 G: 10 SOLUTION ORAL at 20:35

## 2017-06-13 RX ADMIN — SUCRALFATE 1 G: 1 SUSPENSION ORAL at 16:22

## 2017-06-13 RX ADMIN — FOLIC ACID 1 MG: 1 TABLET ORAL at 08:59

## 2017-06-13 RX ADMIN — Medication 100 MG: at 08:59

## 2017-06-13 RX ADMIN — MAGNESIUM OXIDE TAB 400 MG (241.3 MG ELEMENTAL MG) 400 MG: 400 (241.3 MG) TAB at 08:58

## 2017-06-13 RX ADMIN — CLINDAMYCIN HYDROCHLORIDE 300 MG: 300 CAPSULE ORAL at 14:24

## 2017-06-13 NOTE — PROGRESS NOTES
RACHAEL  D:  Have made several attempts to meet with patient since yesterday afternoon however other staff have been with her providing care.  Will see her as soon as she is available to discuss her interested in transferring to [facility].  Note Hem/Onc will see patient today.    Update:  Met with patient.  She explains two relatives have been at [facility] and highly recommended the facility.  Writer explained currently the facility only has private rooms available which charge a daily fee of $40.00 which is not covered by insurance.  Patient stated she would like a private room and requested the referral be started. Referral made to [facility]. Also explained some facilities have private rooms at no additional fee.  Will be giving her a list of these facilities.      Update:  Met with patient and daughter Nery Gabriel reviewed the TCU list and asked if [facility] or [facility] have private rooms with no additional cost.  [facility] has a double room available today and a private available on Wednesday at no additional cost.  If patient is d/c'd today and goes to [facility] today, the facility can move patient into the private room on Wednesday.  We discussed transportation and family will transport on day of d/c.  P: Writer will update daughter and patient once writer hears back from [facility].    Addendum:  Daughter Nery called writer back to discuss Substance Abuse Treatment for patient post TCU.  Writer explained to daughter and patient that under her Medicare insurance there is one treatment program which is [facility] in [facility] which has both In-patient and Out Patient programs.  Daughter stated to writer and her mother that she will need In-patient.  With patient permission, writer is giving daughter the phone number to call and schedule an assessment for patient.      [facility] declined patient secondary to her ETOH history. Awaiting  to hear back from [facility].  Have updated [facility] regarding patient's plan to seek Substance Abuse treatment.  Sara at [facility] has accepted patient for admission today or tomorrow.

## 2017-06-13 NOTE — PLAN OF CARE
Problem: Goal Outcome Summary  Goal: Goal Outcome Summary  Outcome: No Change  Patient a/o jaundiceed,urine sample sent labs drawn. upto br with assist of 1,redness on leg no worse.ciwa 4-1 no meds.

## 2017-06-13 NOTE — PLAN OF CARE
Problem: Goal Outcome Summary  Goal: Goal Outcome Summary  Discharge Planner PT   Patient plan for discharge: TCU  Current status: Pt performed bed mobility and sit to/from stand transfers with SBA.  Pt performed gait training x 450 ft using wheeled walker and CGA.  Min assist at times to assist with navigating walker on straight path as pt tends to veer to the left.  Pt a little shaky today.  C/o LE's feeling weak toward end of gait.    Barriers to return to prior living situation: lives alone; 1 step to enter [facility].  Recommendations for discharge: TCU  Rationale for recommendations: Pt will need to be IND to return home alone.  Requires continued PT to progress strength.       Entered by: Sara Loo 06/13/2017 2:26 PM

## 2017-06-13 NOTE — PROGRESS NOTES
Consult dictated.    71-year-old female with acute thrombocytopenia.  Thrombocytopenia is multifactorial.  It is due to medications (antibiotics), cellulitis/infection, alcohol abuse and low-grade DIC.    Plan:  -We will get some labs including peripheral blood smear review.  -Considered CT abdomen/pelvis.  -If thrombocytopenia does not improve, bone marrow biopsy will be considered.

## 2017-06-13 NOTE — PLAN OF CARE
Problem: Goal Outcome Summary  Goal: Goal Outcome Summary  Pt is A/Ox4.  Up with A1 and walker.  2 gram Na diet.  CIWAs 0, 2, 3.  1 small episode of incontinence.  O2 sats dip to upper 80s when asleep.  Placed on 1 L NC at night.  Tremor seen when handling pills and water.  PIV is SL.  Continue to monitor.

## 2017-06-13 NOTE — DOWNTIME EVENT NOTE
The EMR was down for 6 hours on 6/12/2017.    Ismael Vinson was responsible for completing the paper charting during this time period.     The following information was re-entered into the system by Sarah Beth Whaley: Flowsheet data, Intake and output and MAR    The following information will remain in the paper chart: Procedures, labs, notes    Sarah Beth Whaley  6/12/2017

## 2017-06-13 NOTE — PROGRESS NOTES
[facility]    Internal Medicine Hospitalist Progress Note  06/13/2017  I evaluated patient on the above date.    Assessment & Plan    Ms. Kavitha Hartman is a pleasant 71-year-old female with past medical history including HTN, DLD, mod AS, dep/anx, EtOH abuse, chronic back pain, recent LE cellulitis ond oral antibiotics, who presented 6/6 with worsening LE erythema and swelling.     BLE cellulitis vs worsening chronic venous stasis:  Recently treated for cellulitis with abx including clindamycin, then switched to minocycline and Keflex (due to intolerance).  - On initial eval 6/6, pt afebrile, nl WBC. BLE US 6/6 negative. Started on IV vanco 6/6. Given a dose of IV Lasix 6/6.  - Improved 6/7. Procal 1.99 6/7. Seen by ID and abx changed to IV cefazolin 6/7 which was stopped 6/9  - Started clindamycin (Keflex recommended by ID, but given possible affect on LFT's /possible side effect of cholestatic jaundice, favored another option by ID, appreciate ID assistance), completed course of clindamycin 6/13  - Keep BLE's elevated.  - Monitor cultures, NGTD  - Management of LE edema as below.     Abnormal liver function tests, suspect from acute EtOH hepatitis, possible chronic liver disease/early cirrhosis, ?acute worsening from meds: On admission with with elevated , , total bilirubin 2.3. Statin held on admission. Abd US 6/7 showed no acute findings and no nodular architecture but has fattly liver infiltration.  - Overall, suspect related to her daily alcohol drinking, but given cholestatic picture, concern if due to meds, vs autoimmune vs obstructive  - Has been on cefazolin, which could affect LFT's.  - Hepatitis panel negative, autoimmune serologies pending, off cefazolin as above.  - Abstinence from EtOH recommended.  - Holding PTA statin.  - LFTs slightly improved initially and bili up and remains elevated.  - discussed with Dr Graham, s/p EGD and EUS 6/12- friable distal  esophagus-biopsied, and normal IHBD, CBD not well visualized. No obvious pancreatic mass. VViral serology for CMV and HSV sent. Possibly MRCP if Bili not improving.  - check  Ammonia level today, start on lactulose, titrate if needed/if ammonia is up.     Worsened chronic LE edema: PTA on PRN Lasix 20 mg daily. No signs of pulm edema or other signs of CHF. Likely due to underlying liver disease and low albumin.  * Given IV Lasix 6/6 as above.  * Leg swelling improved 6/7. Echo 6/7 showed LVEF >70%, suggestion of impaired LV relaxation, mod AS, ascending aorta is mildly dilated.  - Given worsened Cr and hyponatremia, Lasix held.  - Continue leg elevation.  - Edema worsened with IVF but IVF stopped 6/11. Stable.  - nutrition consult, nutritional supplement. Needs follow up.  - encourage IS as mildly hypoxic at sleep, if dyspneic/persistent hypoxia, will try lasix.     Acute kidney injury, stage 1  - Cr normal on admission, baseline 0.8-1, but increased upto 1.4  - Fto be prerenal, Nephrology consulted, was maintained on NS, and now Cr better to baseline.  - Monitor BMP.  - Avoid nephrotoxic medications.     Hyponatremia, possibly nutrional or related to volume overload, ?due to liver disease.  - Na 129 on admission 6/6. Given Lasix 6/6.  - Hyponatremia workup labs not suggestive of SIADH, no overt CHF/volume overload  - tolerated IV fluid as above, and sodium improved to 133   - appreciate nephrology assistance, has signed off.     Thrombocytopenia.  * Unclear etiology; possibly from liver disease- though normal at presentation, medication effect or from other cause including low grade DIC.  - Platelet count continues to drop, 166-->143-->118-->77-->65-->51  - Lovenox discontinued.  - Consider platelet transfusion if needs a procedure and less than 50,000; or if less than 10,000.  - HIT panel negative  - Has elevated dimer, low fibrinogen, mildly elevated INR, concern for DIC vs again due to liver disease, awaiting  hematology eval     EtOH abuse: She reported drinking 2-3 cocktails per night. She denied having history of alcohol withdrawal in the past.    - Her blood work here suggests chronic alcoholic use with hyponatremia, hypokalemia, and elevated liver function tests; also fatty liver on US. INR normal.  - More tremulous 6/8, started on CIWA 6/8.  - Stable 6/9 and 6/10 though tremulous and unsteady.  - On CIWA protocol.  - Abstinence from EtOH recommended.     Hypokalemia, could be due nutritional or related to PRN Lasix at home.  - on K replacement protocol.     Moderate aortic stenosis.  * Her last echocardiogram was in 08/2015.  Follows with [facility].  * Echo 6/7 showed LVEF >70%, suggestion of impaired LV relaxation, mod AS, ascending aorta is mildly dilated.  - Monitor i/o's, daily wts.  - F/u Cardiologist for further monitoring.     Hypertension (benign essential).  [PTA: metoprolol XL 25 mg daily.]  - Continue metoprolol XL.     Dyslipidemia.  - Holding atorvastatin as above.     Depression/anxiety.   * Psychiatry consulted and started Trintellix and Seroquel 6/9.  - Hold on starting Seroquel due to rising LFT's.  - Continue Remeron and trazodone.  - Apprec help from Dr. Vargas     Insomnia.  - Continue PRN temazepam.     Chronic back pain.  * On narcotic agreement.  - Continue gabapentin, PTA PRN Ultram 50 mg BID.     Prophylaxis.  - PCD's. lovenox stopped due to thrombocytopenia  - PPI discontinued given thrombocytopenia, ranitidine.    CODE status: Full  Unable to reach daughter for update. I have left a voice mail. Will follow up this afternoon. Discussed with RN.    Interval History      C/o increased tremor, nausea and abdominal discomfort.  Feels tired.  back pain, mid thoracic region has improved.  Feels constipated, did have BM but small, and feels like not able to evacuate completely. Reports she does manual evacuation at times.    -Data reviewed today: I reviewed all new labs over the last 24  "hours. I personally reviewed no images or EKG's today.    Physical Exam   Heart Rate: 73, Blood pressure 116/78, pulse 97, temperature 97.8  F (36.6  C), temperature source Oral, resp. rate 18, height 1.626 m (5' 4\"), weight 89.7 kg (197 lb 12 oz), SpO2 98 %, not currently breastfeeding.  Vitals:    06/11/17 0653 06/12/17 0618 06/13/17 0626   Weight: 86.5 kg (190 lb 11.2 oz) 88.5 kg (195 lb) 89.7 kg (197 lb 12 oz)     Vital Signs with Ranges  Temp:  [97.4  F (36.3  C)-98.5  F (36.9  C)] 97.8  F (36.6  C)  Pulse:  [53-97] 97  Heart Rate:  [68-80] 73  Resp:  [7-18] 18  BP: ()/(53-86) 116/78  SpO2:  [88 %-100 %] 98 %  Patient Vitals for the past 24 hrs:   BP Temp Temp src Pulse Heart Rate Resp SpO2 Weight   06/13/17 0823 116/78 97.8  F (36.6  C) Oral 97 - 18 98 % -   06/13/17 0626 - - - - - - - 89.7 kg (197 lb 12 oz)   06/12/17 2330 102/53 97.4  F (36.3  C) Oral - 73 18 97 % -   06/12/17 2212 - - - - - - 95 % -   06/12/17 2200 - - - - - - (!) 89 % -   06/12/17 2000 - - - - - 18 99 % -   06/12/17 1900 - - - - - - (!) 88 % -   06/12/17 1844 - - - - - - 95 % -   06/12/17 1800 - - - - - - 99 % -   06/12/17 1757 115/71 97.4  F (36.3  C) Oral - 78 18 93 % -   06/12/17 1730 106/86 - - - 72 10 99 % -   06/12/17 1721 103/80 - - - 73 9 99 % -   06/12/17 1720 (!) 75/62 - - - 68 (!) 7 99 % -   06/12/17 1710 (!) 84/76 - - - 70 (!) 7 99 % -   06/12/17 1700 92/63 - - - 69 8 98 % -   06/12/17 1652 92/76 - - - 73 11 99 % -   06/12/17 1648 - - - - 78 11 98 % -   06/12/17 1645 95/66 - - - - - 100 % -   06/12/17 0936 - - - - 80 - - -   06/12/17 0851 120/73 98.5  F (36.9  C) Oral 53 - 18 92 % -     I/O's Last 24 hours  I/O last 3 completed shifts:  In: -   Out: 200 [Urine:200]    Constitutional: Jaundiced. Alert, oriented, pleasant. Appears tired and more tremulous today.  HEENT: PERRLA, EOMI, conjunctival icterus noted. Face is puffy.  Respiratory: Clear, no crackles/wheezes. Normal work of breathing. Remains on room " air.  Cardiovascular: RRR, +I-II/VI sys murmur, no tachycardia  GI: Soft, mild suprapubic tenderness (+), BS (+). Areas of ecchymosis over the abd wall stable  Skin/Integumen: BLE edema noted, no redness noted. Bruises over arms.  Neuro: AAOx3, follows verbal commands appropriately. CN 2-12 intact.       Data     Recent Labs  Lab 06/13/17  0800 06/12/17  0730 06/11/17  1540 06/11/17  0910 06/10/17  0823  06/07/17  0720   WBC 7.2 5.9  --   --  7.2  < > 6.2   HGB 11.7 11.3*  --   --  11.3*  < > 11.3*   * 100  --   --  100  < > 99   PLT Pending 51*  --  65* 77*  < > 166   INR  --   --  1.15*  --  1.19*  --  1.02   * 133  --  134 131*  < > 130*   POTASSIUM 4.7 4.5  --  4.4 3.8  < > 2.8*   CHLORIDE 96 98  --  95 92*  < > 85*   CO2 32 32  --  32 31  < > 36*   BUN 13 12  --  15 20  < > 18   CR 1.05* 0.92  --  0.85 1.04  < > 0.88   ANIONGAP 4 3  --  7 8  < > 9   BIRGIT 8.4* 7.7*  --  7.9* 7.8*  < > 7.9*   GLC 88 75  --  75 75  < > 66*   ALBUMIN 2.4* 2.1*  --  2.3* 2.0*  < > 2.2*   PROTTOTAL 5.7* 5.0*  --  5.9* 5.0*  < > 5.0*   BILITOTAL 8.8* 5.2*  --  5.1* 4.2*  < > 2.4*   ALKPHOS 262* 227*  --  257* 215*  < > 173*   ALT 22 18  --  20 20  < > 115*   AST 71* 54*  --  68* 66*  < > 212*   < > = values in this interval not displayed.  Recent Labs   Lab Test  06/13/17   0800  06/12/17   0730  06/11/17   0910  06/10/17   0823  06/09/17   0745   04/11/12   1214  04/11/12   0649   GLC  88  75  75  75  88   < >   --    --    BGM   --    --    --    --    --    --   134*  112*    < > = values in this interval not displayed.       No results found for this or any previous visit (from the past 24 hour(s)).    Medications   All medications were reviewed.       lactulose  10 g Oral BID     sucralfate  1 g Oral 4x Daily AC & HS     ranitidine  150 mg Oral BID     magnesium oxide  400 mg Oral Daily     vitamin  B-1  100 mg Oral Daily     vortioxetine  5 mg Oral Daily     clindamycin  300 mg Oral Q8H AMOR     metoprolol  12.5 mg  Oral Daily     gabapentin (NEURONTIN) tablet 600 mg  600 mg Oral At Bedtime     gabapentin (NEURONTIN) capsule 300 mg  300 mg Oral Daily     mirtazapine  45 mg Oral At Bedtime     multivitamin, therapeutic  1 tablet Oral Daily     traZODone  100 mg Oral At Bedtime     sodium chloride (PF)  3 mL Intracatheter Q8H     folic acid  1 mg Oral Daily

## 2017-06-13 NOTE — PROGRESS NOTES
RACHAEL  D: [facility] [place] and is asking for a referral to made to this facility.  Referral made DOD.

## 2017-06-13 NOTE — CONSULTS
"CLINICAL NUTRITION SERVICES  -  ASSESSMENT NOTE      Recommendations Ordered by Registered Dietitian (RD):   Ensure Shakes BID between meals     Malnutrition: Patient does not meet two of the above criteria necessary for diagnosing malnutrition        REASON FOR ASSESSMENT  Kavitha Headley is a 71 year old female seen by Registered Dietitian for Provider Order - education - cirrhosis, hypoalbuminemia and LOS      NUTRITION HISTORY  - Information obtained from the pt.  She had gastric bypass surgery 10 years ago, she is used to small meals.   Pt says she has a poor diet, admits to drinking ETOH.  She doesn't eat breakfast, often doesn't eat lunch or may have a sandwich. She cooks dinner and eats a small amount.  Per H&P, she has 2-3 cocktails every night.      CURRENT NUTRITION ORDERS  Diet Order:     2000 mg Sodium     Current Intake/Tolerance:  Pt's intake is poor, 25-75% per flow sheets. She c/o constipations, abd discomfort.  For breakfast she ate 4 bites on an omelet, some banana bread and is now snacking on her fruit.      PHYSICAL FINDINGS  Observed  Jaundice   Obtained from Chart/Interdisciplinary Team  Chronic LE edema - likely due to underlying liver disease and low albumin    ANTHROPOMETRICS  Height: 5' 4\"  Admit Weight: 85.2 kg  Body mass index is 32 kg/(m^2).  Weight Status:  Obesity Grade I BMI 30-34.9  IBW: 54.5 kg +/- 10%  % IBW: 156%  Weight History: Pt states she weighed 293# before gastric bypass surgery 10 years ago.  Her admit wt of 85.2 kg has been stable.  Wt Readings from Last 10 Encounters:   06/13/17 89.7 kg (197 lb 12 oz)   06/02/17 84.4 kg (186 lb)   05/30/17 87.7 kg (193 lb 6.4 oz)   05/26/17 84.8 kg (187 lb)   05/09/17 84.9 kg (187 lb 3.2 oz)   01/17/17 88 kg (194 lb)   01/06/17 88.5 kg (195 lb)   12/12/16 85.3 kg (188 lb)   10/06/16 81.6 kg (180 lb)   09/21/16 84.6 kg (186 lb 6.4 oz)       LABS  Alb 2.4 - Not a reliable indicator of malnutrition. Albumin and prealbumin are negative " acute-phase reactants, they decrease when inflammation is present and rise when it is resolved.    MEDICATIONS  Folvite  Thera-Vit  Thiamin    Dosing Weight 62.2 kg - adjusted for obesity    ASSESSED NUTRITION NEEDS PER APPROVED PRACTICE GUIDELINES:  Estimated Energy Needs: 6494-8134 kcals (25-30 Kcal/Kg)  Justification: obese  Estimated Protein Needs: 75-95 grams protein (1.2-1.5 g pro/Kg)  Justification: preservation of lean body mass    MALNUTRITION:  % Weight Loss:  None noted  % Intake:  </= 50% for >/= 5 days (severe malnutrition)  Subcutaneous Fat Loss:  None observed  Muscle Loss:  None observed  Fluid Retention:  Chronic, likely not nutrition-related    Malnutrition Diagnosis: Patient does not meet two of the above criteria necessary for diagnosing malnutrition      NUTRITION DIAGNOSIS:  Inadequate oral intake related to altered GI function as evidenced by report of poor intake, 25-75%       NUTRITION INTERVENTIONS  Recommendations / Nutrition Prescription  Continue 2 gm Na diet  Nutritional supplements.      Implementation  Nutrition education: Not appropriate at this time due to pt's poor intake  Medical Food Supplement - will send Ensure Shakes BID between meals.      Nutrition Goals  Pt will consume at least 50% of meals and supplements.      MONITORING AND EVALUATION:  Progress towards goals will be monitored and evaluated per protocol and Practice Guidelines    Elsie Moraes RD  Pager [phone] (M-F)            [phone] (W/E & Hol)

## 2017-06-13 NOTE — PLAN OF CARE
Problem: Goal Outcome Summary  Goal: Goal Outcome Summary  Outcome: No Change  Pt returned from Endo at 1750-VSS, Sats 99% on 3l, sleepy, denies pain, CIWA-1, skin jaundiced, plat-54, Hgb-11.3, Hemo/onco consult in am, will monitor.

## 2017-06-14 ENCOUNTER — APPOINTMENT (OUTPATIENT)
Dept: CT IMAGING | Facility: CLINIC | Age: 72
DRG: 432 | End: 2017-06-14
Attending: HOSPITALIST
Payer: MEDICARE

## 2017-06-14 LAB
ALBUMIN SERPL-MCNC: 2.5 G/DL (ref 3.4–5)
ALP SERPL-CCNC: 213 U/L (ref 40–150)
ALT SERPL W P-5'-P-CCNC: 25 U/L (ref 0–50)
ANION GAP SERPL CALCULATED.3IONS-SCNC: 4 MMOL/L (ref 3–14)
AST SERPL W P-5'-P-CCNC: 65 U/L (ref 0–45)
BILIRUB DIRECT SERPL-MCNC: 6.9 MG/DL (ref 0–0.2)
BILIRUB SERPL-MCNC: 8.1 MG/DL (ref 0.2–1.3)
BUN SERPL-MCNC: 10 MG/DL (ref 7–30)
CALCIUM SERPL-MCNC: 8.2 MG/DL (ref 8.5–10.1)
CHLORIDE SERPL-SCNC: 97 MMOL/L (ref 94–109)
CMV IGM SERPL QL IA: NORMAL AI (ref 0–0.8)
CO2 SERPL-SCNC: 33 MMOL/L (ref 20–32)
CREAT SERPL-MCNC: 0.96 MG/DL (ref 0.52–1.04)
EBV VCA IGG SER QL IA: ABNORMAL AI (ref 0–0.8)
EBV VCA IGG SER QL IA: ABNORMAL AI (ref 0–0.8)
FERRITIN SERPL-MCNC: 167 NG/ML (ref 8–252)
FOLATE SERPL-MCNC: 26.9 NG/ML
GFR SERPL CREATININE-BSD FRML MDRD: 57 ML/MIN/1.7M2
GLUCOSE SERPL-MCNC: 74 MG/DL (ref 70–99)
IRON SATN MFR SERPL: 98 % (ref 15–46)
IRON SERPL-MCNC: 123 UG/DL (ref 35–180)
LDH SERPL L TO P-CCNC: 202 U/L (ref 81–234)
PLATELET # BLD AUTO: 61 10E9/L (ref 150–450)
POTASSIUM SERPL-SCNC: 4.2 MMOL/L (ref 3.4–5.3)
PROT SERPL-MCNC: 5.3 G/DL (ref 6.8–8.8)
SODIUM SERPL-SCNC: 134 MMOL/L (ref 133–144)
TIBC SERPL-MCNC: 126 UG/DL (ref 240–430)
VIT B12 SERPL-MCNC: 1832 PG/ML (ref 193–986)

## 2017-06-14 PROCEDURE — 82728 ASSAY OF FERRITIN: CPT | Performed by: HOSPITALIST

## 2017-06-14 PROCEDURE — 25000132 ZZH RX MED GY IP 250 OP 250 PS 637: Mod: GY | Performed by: HOSPITALIST

## 2017-06-14 PROCEDURE — 82607 VITAMIN B-12: CPT | Performed by: HOSPITALIST

## 2017-06-14 PROCEDURE — 85049 AUTOMATED PLATELET COUNT: CPT | Performed by: HOSPITALIST

## 2017-06-14 PROCEDURE — 25000132 ZZH RX MED GY IP 250 OP 250 PS 637: Mod: GY | Performed by: INTERNAL MEDICINE

## 2017-06-14 PROCEDURE — A9270 NON-COVERED ITEM OR SERVICE: HCPCS | Mod: GY | Performed by: HOSPITALIST

## 2017-06-14 PROCEDURE — A9270 NON-COVERED ITEM OR SERVICE: HCPCS | Mod: GY | Performed by: INTERNAL MEDICINE

## 2017-06-14 PROCEDURE — 83615 LACTATE (LD) (LDH) ENZYME: CPT | Performed by: HOSPITALIST

## 2017-06-14 PROCEDURE — 83550 IRON BINDING TEST: CPT | Performed by: HOSPITALIST

## 2017-06-14 PROCEDURE — 27211289 ZZ H KIT CATH IV 4FR 8 CM OR 10 CM, POWERWAND QUICK XL

## 2017-06-14 PROCEDURE — 40000257 ZZH STATISTIC CONSULT NO CHARGE VASC ACCESS

## 2017-06-14 PROCEDURE — 36569 INSJ PICC 5 YR+ W/O IMAGING: CPT

## 2017-06-14 PROCEDURE — 25000125 ZZHC RX 250: Performed by: INTERNAL MEDICINE

## 2017-06-14 PROCEDURE — 99233 SBSQ HOSP IP/OBS HIGH 50: CPT | Performed by: HOSPITALIST

## 2017-06-14 PROCEDURE — 82746 ASSAY OF FOLIC ACID SERUM: CPT | Performed by: INTERNAL MEDICINE

## 2017-06-14 PROCEDURE — 83540 ASSAY OF IRON: CPT | Performed by: HOSPITALIST

## 2017-06-14 PROCEDURE — 12000000 ZZH R&B MED SURG/OB

## 2017-06-14 PROCEDURE — A9270 NON-COVERED ITEM OR SERVICE: HCPCS | Mod: GY | Performed by: PSYCHIATRY & NEUROLOGY

## 2017-06-14 PROCEDURE — P9047 ALBUMIN (HUMAN), 25%, 50ML: HCPCS | Performed by: HOSPITALIST

## 2017-06-14 PROCEDURE — 82248 BILIRUBIN DIRECT: CPT | Performed by: HOSPITALIST

## 2017-06-14 PROCEDURE — 36415 COLL VENOUS BLD VENIPUNCTURE: CPT | Performed by: HOSPITALIST

## 2017-06-14 PROCEDURE — 25000128 H RX IP 250 OP 636: Performed by: HOSPITALIST

## 2017-06-14 PROCEDURE — 25000132 ZZH RX MED GY IP 250 OP 250 PS 637: Mod: GY | Performed by: PSYCHIATRY & NEUROLOGY

## 2017-06-14 PROCEDURE — 74177 CT ABD & PELVIS W/CONTRAST: CPT

## 2017-06-14 PROCEDURE — 80053 COMPREHEN METABOLIC PANEL: CPT | Performed by: HOSPITALIST

## 2017-06-14 PROCEDURE — 25000128 H RX IP 250 OP 636: Performed by: INTERNAL MEDICINE

## 2017-06-14 RX ORDER — IOPAMIDOL 755 MG/ML
93 INJECTION, SOLUTION INTRAVASCULAR ONCE
Status: COMPLETED | OUTPATIENT
Start: 2017-06-14 | End: 2017-06-14

## 2017-06-14 RX ADMIN — Medication 100 MG: at 08:50

## 2017-06-14 RX ADMIN — THERA TABS 1 TABLET: TAB at 08:50

## 2017-06-14 RX ADMIN — GABAPENTIN 300 MG: 300 CAPSULE ORAL at 08:50

## 2017-06-14 RX ADMIN — MAGNESIUM OXIDE TAB 400 MG (241.3 MG ELEMENTAL MG) 400 MG: 400 (241.3 MG) TAB at 08:49

## 2017-06-14 RX ADMIN — LACTULOSE 10 G: 10 SOLUTION ORAL at 22:04

## 2017-06-14 RX ADMIN — FUROSEMIDE 20 MG: 10 INJECTION, SOLUTION INTRAVENOUS at 16:38

## 2017-06-14 RX ADMIN — IOPAMIDOL 93 ML: 755 INJECTION, SOLUTION INTRAVENOUS at 16:59

## 2017-06-14 RX ADMIN — VORTIOXETINE 5 MG: 5 TABLET, FILM COATED ORAL at 08:49

## 2017-06-14 RX ADMIN — FOLIC ACID 1 MG: 1 TABLET ORAL at 08:50

## 2017-06-14 RX ADMIN — TEMAZEPAM 7.5 MG: 7.5 CAPSULE ORAL at 22:04

## 2017-06-14 RX ADMIN — TRAMADOL HYDROCHLORIDE 50 MG: 50 TABLET, COATED ORAL at 17:38

## 2017-06-14 RX ADMIN — Medication 12.5 MG: at 08:56

## 2017-06-14 RX ADMIN — LACTULOSE 10 G: 10 SOLUTION ORAL at 08:52

## 2017-06-14 RX ADMIN — MIRTAZAPINE 45 MG: 15 TABLET, FILM COATED ORAL at 22:04

## 2017-06-14 RX ADMIN — SUCRALFATE 1 G: 1 SUSPENSION ORAL at 08:52

## 2017-06-14 RX ADMIN — SUCRALFATE 1 G: 1 SUSPENSION ORAL at 16:38

## 2017-06-14 RX ADMIN — LORAZEPAM 1 MG: 1 TABLET ORAL at 05:19

## 2017-06-14 RX ADMIN — ALBUMIN (HUMAN) 25 G: 12.5 SOLUTION INTRAVENOUS at 16:39

## 2017-06-14 RX ADMIN — ALBUMIN (HUMAN) 25 G: 12.5 SOLUTION INTRAVENOUS at 12:14

## 2017-06-14 RX ADMIN — SUCRALFATE 1 G: 1 SUSPENSION ORAL at 22:04

## 2017-06-14 RX ADMIN — TRAZODONE HYDROCHLORIDE 100 MG: 100 TABLET ORAL at 22:04

## 2017-06-14 RX ADMIN — FUROSEMIDE 20 MG: 10 INJECTION, SOLUTION INTRAVENOUS at 08:57

## 2017-06-14 RX ADMIN — SODIUM CHLORIDE 68 ML: 9 INJECTION, SOLUTION INTRAVENOUS at 16:59

## 2017-06-14 RX ADMIN — RANITIDINE 150 MG: 150 TABLET ORAL at 22:04

## 2017-06-14 RX ADMIN — GABAPENTIN 600 MG: 600 TABLET, FILM COATED ORAL at 22:04

## 2017-06-14 RX ADMIN — RANITIDINE 150 MG: 150 TABLET ORAL at 08:50

## 2017-06-14 NOTE — PROGRESS NOTES
SPIRITUAL HEALTH SERVICES  Spiritual Assessment Progress Note  [facility]    Stopped in to introduce [facility] per length of stay visit. Had a short but pleasant conversation about how her stay keeps getting lengthened and how she hopes they can find what is truly wrong so she can go home. Daughter Nery was in the room and the three of us said a prayer. Pt will contact if there are future spiritual needs.      Marily Perez   Intern  Pager [phone]

## 2017-06-14 NOTE — PLAN OF CARE
Problem: Goal Outcome Summary  Goal: Goal Outcome Summary  Outcome: No Change  A&Ox4. VSS. CIWA 1 for mild tremors. On RA. Fair appetite. Pt reports feeling stronger today and walked the unit with walker 3 times. SBA. Albumin 2.4. Replaced this shift. MRCP planned for tomorrow per GI to look at the liver and spleen.

## 2017-06-14 NOTE — PLAN OF CARE
Problem: Goal Outcome Summary  Goal: Goal Outcome Summary  PT-  Attempted to see pt for PT but pt had a stop sign on her door stating do not enter.  Unable to see at time of appt.

## 2017-06-14 NOTE — PROGRESS NOTES
[facility]  Gastroenterology Progress Note     Kavitha Headley MRN# [id number]   YOB: 1945 Age: 71 year old          Assessment and Plan:     Cellulitis    ETOH hepatitis and likely cirrhosis. Patient had EGD and EUS done yesterday. Patient appears awake ans skaky till. No astrixis. Patient had increase in LFTs today.   I will recommend to check EBV. CMV PCR and HSV studies are pending. Platelet are stable. 64K today. Will get MRCP tomorrow.  Continue on supportive care.   Continue on albumin.              Cellulitis of right lower extremity  Hypokalemia  Elevated LFTs  Thrombocytopenia (H)      Interval History:   doing well, denies chest pain, pain is controlled, alert, oriented to person, place and time and has had a bowel movement in the last 24 hours              Review of Systems:   C: NEGATIVE for fever, chills, change in weight  E/M: NEGATIVE for ear, mouth and throat problems  R: NEGATIVE for significant cough or SOB  CV: NEGATIVE for chest pain, palpitations or peripheral edema             Medications:   I have reviewed this patient's current medications    lactulose  10 g Oral BID     albumin human  25 g Intravenous BID     furosemide  20 mg Intravenous BID     sucralfate  1 g Oral 4x Daily AC & HS     ranitidine  150 mg Oral BID     magnesium oxide  400 mg Oral Daily     vitamin  B-1  100 mg Oral Daily     vortioxetine  5 mg Oral Daily     clindamycin  300 mg Oral Q8H AMOR     metoprolol  12.5 mg Oral Daily     gabapentin (NEURONTIN) tablet 600 mg  600 mg Oral At Bedtime     gabapentin (NEURONTIN) capsule 300 mg  300 mg Oral Daily     mirtazapine  45 mg Oral At Bedtime     multivitamin, therapeutic  1 tablet Oral Daily     traZODone  100 mg Oral At Bedtime     sodium chloride (PF)  3 mL Intracatheter Q8H     folic acid  1 mg Oral Daily                  Physical Exam:   Vitals were reviewed  Vital Signs with Ranges  Temp:  [97.4  F (36.3  C)-97.8  F (36.6  C)] 97.8  F (36.6   C)  Pulse:  [97] 97  Heart Rate:  [73-82] 82  Resp:  [18] 18  BP: (102-116)/(53-78) 109/67  SpO2:  [89 %-99 %] 94 %  I/O last 3 completed shifts:  In: 240 [P.O.:240]  Out: 150 [Urine:150]  Constitutional: healthy, alert and no distress   Cardiovascular: negative, PMI normal. No lifts, heaves, or thrills. RRR. No murmurs, clicks gallops or rub  Respiratory: negative, Percussion normal. Good diaphragmatic excursion. Lungs clear  Head: Normocephalic. No masses, lesions, tenderness or abnormalities  Neck: Neck supple. No adenopathy. Thyroid symmetric, normal size,, Carotids without bruits.  Abdomen: Abdomen soft, non-tender. BS normal. No masses, organomegaly  SKIN: no suspicious lesions or rashes           Data:   I reviewed the patient's new clinical lab test results.   Recent Labs   Lab Test  06/13/17   0800  06/12/17   0730  06/11/17   1540  06/11/17   0910  06/10/17   0823   06/07/17   0720   WBC  7.2  5.9   --    --   7.2   < >  6.2   HGB  11.7  11.3*   --    --   11.3*   < >  11.3*   MCV  103*  100   --    --   100   < >  99   PLT  64*  51*   --   65*  77*   < >  166   INR   --    --   1.15*   --   1.19*   --   1.02    < > = values in this interval not displayed.     Recent Labs   Lab Test  06/13/17   0800  06/12/17   0730  06/11/17   0910   POTASSIUM  4.7  4.5  4.4   CHLORIDE  96  98  95   CO2  32  32  32   BUN  13  12  15   ANIONGAP  4  3  7     Recent Labs   Lab Test  06/13/17   1350  06/13/17   0800  06/12/17   0730  06/11/17   0910   06/09/17   1335   06/06/17   2300   11/06/10   1415   ALBUMIN   --   2.4*  2.1*  2.3*   < >   --    < >   --    < >  3.7   BILITOTAL   --   8.8*  5.2*  5.1*   < >   --    < >   --    < >  0.5   ALT   --   22  18  20   < >   --    < >   --    < >  35   AST   --   71*  54*  68*   < >   --    < >   --    < >  60*   PROTEIN  30*   --    --    --    --   10*   --   Negative   < >   --    LIPASE   --    --    --    --    --    --    --    --    --   139    < > = values in this  interval not displayed.       I reviewed the patient's new imaging results.    All laboratory data reviewed  All imaging studies reviewed by me.    Parth Graham MD,  6/13/2017  [facility]  Office : [phone]  Cell: [phone]

## 2017-06-14 NOTE — PLAN OF CARE
Problem: Goal Outcome Summary  Goal: Goal Outcome Summary  Outcome: No Change  Alert and oriented. Up with SBA and walker to the bathroom. VSS on room air. Denies pain. CIWA 3, 5, 8 - 1 mg PO ativan given. Scoring for tremor, anxiety and headache. Skin jaundice and bruised. +2 bilateral lower extremity edema. Plan for MRCP today.

## 2017-06-14 NOTE — PROGRESS NOTES
Ms. Kavitha Headley is a 71-year-old female who has been admitted to the hospital with bilateral lower extremity cellulitis and abnormal liver function tests.  The patient has multiple medical problems, including hypertension, dyslipidemia and alcohol abuse.     Hematology is following for acute thrombocytopenia.  HIT has been ruled out.  Thrombocytopenia is multifactorial.  It is due to combination of antibiotics, liver disease, alcohol use and low-grade DIC.    Patient overall is stable.  She is tired.  No bleeding from ear, nose or throat.  No blood in the urine or stool.  No fever.  No chills.    Labs: Reviewed.    Assessment and plan:  1.  Acute thrombocytopenia.  Thrombocytopenia in the stable.  No bleeding.  Thrombocytopenia is mainly due to combination of decompensated liver disease and antibiotics for cellulitis.  -Monitor CBC.  Transfuse platelets if bleeding or platelet below 20.  -We will consider bone marrow biopsy if there is worsening of thrombocytopenia.    2.  Alcohol abuse.  -Patient advised not to resume alcohol after discharge.  She is thinking of getting inpatient treatment for it.    3.  Elevated liver function test secondary to liver cirrhosis and alcohol abuse.  GI following.  CT has been ordered.

## 2017-06-14 NOTE — CONSULTS
REASON FOR CONSULTATION:  This consult has been requested by Dr. Caldera for thrombocytopenia.      HISTORY OF PRESENT ILLNESS:  Ms. Kavitha Headley is a 71-year-old female who has been admitted to the hospital with bilateral lower extremity cellulitis and abnormal liver function tests.  The patient has multiple medical problems, including hypertension, dyslipidemia and alcohol abuse.  In the hospital, the patient has developed thrombocytopenia.  Heparin-induced thrombocytopenia has been ruled out.  Labs are reviewed and summarized below.      1.  On 05/26/2017, WBC of 8.1, hemoglobin of 13.6 and platelets of 242.     2.  On 06/06/2017:  -Platelets of 217.  Her platelets then started to progressively decrease.  It decreased to 51 on 06/12/2017.  WBC has remained normal.  Hemoglobin decreased slightly to 11.3.   -Chemistry panel on 06/06/2017 revealed elevated AST, ALT, alkaline phosphatase and bilirubin.   3.  Ultrasound abdomen on 06/07/2017 reveals fatty infiltration of the liver.    4. Hepatitis B and C on 06/09/2017 are negative.   5.  Multiple labs done on 06/11/2017:   -INR 1.15.   -Fibrinogen of 194.   -D-dimer of 1.8.   -HIT antibody negative.      The patient was admitted with bilateral leg cellulitis.  On admission, she was started on vancomycin.  It was discontinued after 2 doses.  She was on IV Ancef.  Now she is on oral clindamycin.  Before admission, the patient had been treated with oral clindamycin, minocycline and Keflex.      The patient denies any previous history of blood disorder.  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  She never was told to have low platelets.  I reviewed the CBC going back to 2006.  She did not have thrombocytopenia.      The patient's other problem is alcohol abuse.  She has been drinking on and off since the age of 28.  The patient says that a few months ago she lost her job.  Since then she has been drinking more.  She drinks every day.  Her LFTs are elevated.   She has been evaluated by gastroenterologist.  EUS on 2017 reveals LA grade C esophagitis.  There is finding of mild chronic pancreatitis.  Dr. Graham (GI) thinks that the patient most likely has underlying cirrhosis.  Acute elevation of LFTs secondary to alcohol abuse.      I met with the patient.  Daughter was in the room.  The patient is anxious and nervous.  The patient denies bleeding from any site.  No bleeding from ear, nose or throat.  No blood in the urine or stool.  No black stool.  She has some easy bruising.      REVIEW OF SYSTEMS:  Some headache.  Some dizziness.  No chest pain.  No shortness of breath.  No vomiting.  Some nausea.  No urinary complaints.  She has some constipation.      ALLERGIES:  Reviewed.      MEDICATIONS:  Reviewed.      PAST MEDICAL HISTORY:   1.  Alcohol abuse.   2.  Hypertension.   3.  Dyslipidemia.   4.  Mild coronary artery disease.   5.  Lower extremity cellulitis.   6.  Gastric bypass surgery for weight loss.   7.  Back pain.   8.  Depression/anxiety.     9.  Moderate aortic stenosis.     10.  Supraventricular tachycardia.   11.  History of chronic kidney disease.   12.  Bilateral knee replacement.     13.  Right hip replacement.   14.  Appendectomy.   15.  Carpal tunnel surgery.    16.  Cholecystectomy.     17.  Cystocele repair.     18.  Mammoplasty.   19.  Hysterectomy with bilateral salpingo-oophorectomy.   20.  Laparotomy with lysis of adhesions and ventral hernia repair.      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    -No history of smoking.    -She has been drinking alcohol since age of 28.  Lately she has been drinking more alcohol.      FAMILY HISTORY:    -Mother  of some rare blood disorder.  It sounds like TTP.    -Father  of COPD and lung cancer.    -She has 2 sisters and 1 brother who are in good health.      PHYSICAL EXAMINATION:   GENERAL:  She was alert and oriented x3.   VITAL SIGNS:  Reviewed.   EYES:  No icterus.   THROAT:  No  ulcer or thrush.   NECK:  Supple.  No lymphadenopathy.   AXILLAE:  No lymphadenopathy.   ABDOMEN:  Soft, nontender.  No mass felt.  Difficult palpation because of her weight.   EXTREMITIES:  Mild edema.   SKIN:  No petechiae.  A few ecchymotic spots on the upper extremities, mainly around the IV insertion site.      LABORATORY DATA:  Reviewed.      ASSESSMENT:   1.  A 71-year-old female with acute thrombocytopenia.     2.  Alcoholic liver disease   3.  Lower extremity cellulitis on antibiotics.   4.  Elevated liver function tests.      RECOMMENDATIONS:   1.  I had a long discussion with the patient and her daughter.  Reviewed the labs.  Discussed regarding thrombocytopenia.  When the patient was admitted, her platelets were normal.  They were 217 on 06/06/2017. It decreased 51 on 06/12; today they are better at 64.      Different causes of thrombocytopenia discussed.  Basic mechanism is decreased production or increased destruction.  As her thrombocytopenia is acute, it goes more in favor of increased destruction. We discussed regarding various causes including ITP, drug-induced thrombocytopenia and liver disease.  Heparin-induced thrombocytopenia has been ruled out.      Patient's thrombocytopenia is multifactorial. The patient has cellulitis.  She received outpatient oral antibiotics.  On admission, she received vancomycin for 2 doses.  Antibiotics can cause thrombocytopenia.  Vancomycin has been known to rarely cause immune thrombocytopenia.  This was published in the [employer] Journal on 03/01/2007.  INR and D-dimer mildly elevated.  Fibrinogen is slightly low.  This would all go in favor of low-grade disseminated intravascular coagulation.  Alcohol abuse and liver disease also contributing to it.  At this time, I am not suspecting primary bone marrow pathology.      We will get some baseline labs, including vitamin B12, folate, serum protein electrophoresis.      I am hoping that her platelets will improve or  normalize.  If her platelets do not improve significantly, we will consider doing a bone marrow biopsy.      2.  Patient advised to quit alcohol completely.  I told her that with continued alcohol use, she will have worsening liver function and its complication.      3.  For her elevated LFT, I would recommend getting a CT abdomen and pelvis to evaluate both liver and spleen.      4.  The patient and daughter had multiple questions, which were all answered.      Thanks for the consult.      Total time spent 60 minutes, more than half the time spent in counseling.         CHRISTIE DAY MD             D: 2017 16:02   T: 2017 18:16   MT: [id number]      Name:     DARWIN JASSO   MRN:      [id number]        Account:       [id number]   :      1945           Consult Date:  2017      Document: [id number]

## 2017-06-14 NOTE — PROGRESS NOTES
[facility]    Internal Medicine Hospitalist Progress Note  06/14/2017  I evaluated patient on the above date.    Assessment & Plan    Ms. Kavitha Hartman is a pleasant 71-year-old female with past medical history including HTN, DLD, mod AS, dep/anx, EtOH abuse, chronic back pain, recent LE cellulitis ond oral antibiotics, who presented 6/6 with worsening LE erythema and swelling.    Abnormal liver function tests, suspect from acute EtOH hepatitis, possible chronic liver disease/early cirrhosis, LFTs elevated on admission, statin held on admission. Abd US 6/7 showed no acute findings and no nodular architecture but has fattly liver infiltration.  - Overall, suspect related to her daily alcohol drinking, but given cholestatic picture, concern if due to meds, vs autoimmune vs obstructive.    - Has been on cefazolin, which could affect LFT's.  - Hepatitis panel negative, TONG and anti mitochondrial Ab negative, CMV neg, EBV capsid IgG positive, HSV pending, off cefazolin as above.  - Abstinence from EtOH recommended.  - LFTs improved initially and bili up and remains elevated.  - discussed with Dr Graham today 6/14, s/p EGD and EUS 6/12- friable distal esophagus-biopsied- pending, and normal IHBD, CBD not well visualized. No obvious pancreatic mass.    - Plan is CT abdomen and pelvis today to eval liver/biliary tree, and spleen as well.  - Ammonia high normal (from 10-45), lactulose started 6/13     BLE cellulitis vs worsening chronic venous stasis:  Recently treated for cellulitis with abx including clindamycin, then switched to minocycline and Keflex (due to intolerance).  - On initial eval 6/6, pt afebrile, nl WBC. BLE US 6/6 negative. Started on IV vanco 6/6. Given a dose of IV Lasix 6/6.  - Improved 6/7. Procal 1.99 6/7. Seen by ID and abx changed to IV cefazolin 6/7 which was stopped 6/9  - Started clindamycin (Keflex recommended by ID, but given possible affect on LFT's /possible side effect of  cholestatic jaundice, favored another option by ID, appreciate ID assistance), completed course of clindamycin 6/13  - Keep BLE's elevated.  - Monitor cultures, NGTD  - Management of LE edema as below.    Worsened chronic LE edema: PTA on PRN Lasix 20 mg daily. No signs of pulm edema or other signs of CHF. Likely due to underlying liver disease and low albumin.  * Given IV Lasix 6/6 as above.  * Leg swelling improved 6/7. Echo 6/7 showed LVEF >70%, suggestion of impaired LV relaxation, mod AS, ascending aorta is mildly dilated.  - Given worsened Cr and hyponatremia, Lasix held since then.  - Continue leg elevation.  - Edema worsened with IVF but IVF stopped 6/11. Stable.  - nutrition consult, nutritional supplement. Needs follow up.  - encourage IS as mildly hypoxic at sleep, now better and of O2 today  - Given slightly worsened Cr, and hyponatremia, concern if developing HRS, started on albumin and lasix- through today. Negative 1L in last 24 hours, dyspnea better, Cr also stable.      Acute kidney injury, stage 1  - Cr normal on admission, baseline 0.8-1, but increased upto 1.4  - Felt to be prerenal/intravascular vol depletion, Nephrology consulted, lasix held and was maintained on NS, and Cr improved, but leg edema has worsened  - Off IVF since 6/11.  -albumin and lasix through today  - Monitor BMP, as going for CT with contrast  - Avoid nephrotoxic medications as able     Hyponatremia, possibly nutrional or related to volume overload, ?due to liver disease.  - Na 129 on admission 6/6. Given Lasix 6/6.  - Hyponatremia workup labs not suggestive of SIADH, no overt CHF/volume overload  - Improved but down trended when IVF stopped, now on albumin and lasix, Sodium normal today at 134  - appreciate nephrology assistance, has signed off.     Thrombocytopenia.  * Unclear etiology; possibly from liver disease- though normal at presentation, medication effect or from other cause including low grade DIC vs Vancomyin  -  Platelet counts dropped and now stable in 60s, 166-->143-->118-->77-->65-->51-->64-->61  - Lovenox discontinued.  - Consider platelet transfusion if needs a procedure and less than 50,000; or if less than 10,000.  - HIT panel negative  - Has elevated dimer, low fibrinogen, mildly elevated INR, concern for low grade DIC vs due to liver disease, discussed with Dr Pa, if continues to drop then may need BM biopsy.     EtOH abuse: She reported drinking 2-3 cocktails per night. She denied having history of alcohol withdrawal in the past.    - Her blood work here suggests chronic alcoholic use with hyponatremia, hypokalemia, and elevated liver function tests; also fatty liver on US. INR normal.  - More tremulous 6/8, started on CIWA 6/8.  - Tremulous and unsteady and feels anxious often but no paras withdrawal, likely due to anxiety and sequela of long term alcohol use  - DC CIWA as has been several days since last drink  - Abstinence from EtOH recommended.     Hypokalemia, could be due nutritional or related to PRN Lasix at home.  - on K replacement protocol.     Moderate aortic stenosis.  * Her last echocardiogram was in 08/2015.  Follow[facility].  * Echo 6/7 showed LVEF >70%, suggestion of impaired LV relaxation, mod AS, ascending aorta is mildly dilated.  - Monitor i/o's, daily wts.  - F/u Cardiologist for further monitoring.     Hypertension (benign essential).  [PTA: metoprolol XL 25 mg daily.]  - Continue metoprolol XL.     Dyslipidemia.  - Holding atorvastatin as above.     Depression/anxiety.   * Psychiatry consulted and started Trintellix and Seroquel 6/9.  - Seroquel held due to rising LFT's and continued on Remeron and trazodone.  - will ask psychiatry to re eval       Insomnia.  - Continue PRN temazepam.     Chronic back pain.  * On narcotic agreement.  - Continue gabapentin, PTA PRN Ultram 50 mg BID.     Prophylaxis.  - PCD's. lovenox stopped due to thrombocytopenia  - PPI discontinued given  "thrombocytopenia, ranitidine.    CODE status: Full    Unable to reach daughter for update despite calling several times in the last 2 days and today as well, per patient she has been travelling and not reachable currently. Discussed with RN.    Disposition: based on CT findings today, follow up LFT/platelet, and when ok with GI/onc, possibly 2 days or so.    Interval History      Feels anxious, remains tremulous.  Denies dyspnea, cough chest pain.   Had small BM yesterday, no abd pain or nausea.  Feels tired.    -Data reviewed today: I reviewed all new labs over the last 24 hours. I personally reviewed no images or EKG's today.    Physical Exam   Heart Rate: 76, Blood pressure 122/80, pulse 97, temperature 97.8  F (36.6  C), temperature source Oral, resp. rate 18, height 1.626 m (5' 4\"), weight 90.5 kg (199 lb 8.3 oz), SpO2 96 %, not currently breastfeeding.  Vitals:    06/12/17 0618 06/13/17 0626 06/14/17 0649   Weight: 88.5 kg (195 lb) 89.7 kg (197 lb 12 oz) 90.5 kg (199 lb 8.3 oz)     Vital Signs with Ranges  Temp:  [97.8  F (36.6  C)-98.5  F (36.9  C)] 97.8  F (36.6  C)  Heart Rate:  [75-95] 76  Resp:  [18] 18  BP: (109-127)/(67-80) 122/80  SpO2:  [94 %-96 %] 96 %  Patient Vitals for the past 24 hrs:   BP Temp Temp src Heart Rate Resp SpO2 Weight   06/14/17 0856 122/80 - - 76 - - -   06/14/17 0742 112/78 97.8  F (36.6  C) Oral 75 18 96 % -   06/14/17 0649 - - - - - - 90.5 kg (199 lb 8.3 oz)   06/14/17 0415 127/76 98.5  F (36.9  C) Oral 95 18 95 % -   06/13/17 1533 109/67 97.8  F (36.6  C) Oral 82 18 94 % -     I/O's Last 24 hours  I/O last 3 completed shifts:  In: 420 [P.O.:420]  Out: 1550 [Urine:1550]    Constitutional: Jaundiced. Alert, oriented, pleasant. Mildly tremulous.  HEENT: PERRLA, EOMI, conjunctival icterus noted. Face is less puffy.  Respiratory: Clear but dim, no crackles/wheezes. Normal work of breathing. Remains on room air.  Cardiovascular: RRR, 2/6 systolic murmur, no tachycardia  GI: Soft, mild " suprapubic tenderness resolved, BS (+). Areas of ecchymosis over the abd wall stable  Skin/Integumen: BLE edema noted slightly better today, no redness noted. Bruises over arms.  Neuro: AAOx3, follows verbal commands appropriately. CN 2-12 intact.       Data     Recent Labs  Lab 06/14/17  0812 06/13/17  1445 06/13/17  0800 06/12/17  0730 06/11/17  1540  06/10/17  0823   WBC  --   --  7.2 5.9  --   --  7.2   HGB  --   --  11.7 11.3*  --   --  11.3*   MCV  --   --  103* 100  --   --  100   PLT 61*  --  64* 51*  --   < > 77*   INR  --   --   --   --  1.15*  --  1.19*    131* 132* 133  --   < > 131*   POTASSIUM 4.2  --  4.7 4.5  --   < > 3.8   CHLORIDE 97  --  96 98  --   < > 92*   CO2 33*  --  32 32  --   < > 31   BUN 10  --  13 12  --   < > 20   CR 0.96 1.00 1.05* 0.92  --   < > 1.04   ANIONGAP 4  --  4 3  --   < > 8   BIRGIT 8.2*  --  8.4* 7.7*  --   < > 7.8*   GLC 74  --  88 75  --   < > 75   ALBUMIN 2.5*  --  2.4* 2.1*  --   < > 2.0*   PROTTOTAL 5.3*  --  5.7* 5.0*  --   < > 5.0*   BILITOTAL 8.1*  --  8.8* 5.2*  --   < > 4.2*   ALKPHOS 213*  --  262* 227*  --   < > 215*   ALT 25  --  22 18  --   < > 20   AST 65*  --  71* 54*  --   < > 66*   < > = values in this interval not displayed.  Recent Labs   Lab Test  06/14/17   0812  06/13/17   0800  06/12/17   0730  06/11/17   0910  06/10/17   0823   04/11/12   1214  04/11/12   0649   GLC  74  88  75  75  75   < >   --    --    BGM   --    --    --    --    --    --   134*  112*    < > = values in this interval not displayed.       No results found for this or any previous visit (from the past 24 hour(s)).    Medications   All medications were reviewed.       lactulose  10 g Oral BID     albumin human  25 g Intravenous BID     furosemide  20 mg Intravenous BID     sucralfate  1 g Oral 4x Daily AC & HS     ranitidine  150 mg Oral BID     magnesium oxide  400 mg Oral Daily     vitamin  B-1  100 mg Oral Daily     vortioxetine  5 mg Oral Daily     metoprolol  12.5 mg Oral  Daily     gabapentin (NEURONTIN) tablet 600 mg  600 mg Oral At Bedtime     gabapentin (NEURONTIN) capsule 300 mg  300 mg Oral Daily     mirtazapine  45 mg Oral At Bedtime     multivitamin, therapeutic  1 tablet Oral Daily     traZODone  100 mg Oral At Bedtime     sodium chloride (PF)  3 mL Intracatheter Q8H     folic acid  1 mg Oral Daily

## 2017-06-15 ENCOUNTER — TELEPHONE (OUTPATIENT)
Dept: CARDIOLOGY | Facility: CLINIC | Age: 72
End: 2017-06-15

## 2017-06-15 LAB
ALBUMIN SERPL-MCNC: 2.9 G/DL (ref 3.4–5)
ALP SERPL-CCNC: 196 U/L (ref 40–150)
ALT SERPL W P-5'-P-CCNC: 39 U/L (ref 0–50)
AST SERPL W P-5'-P-CCNC: 96 U/L (ref 0–45)
BASOPHILS # BLD AUTO: 0.1 10E9/L (ref 0–0.2)
BASOPHILS NFR BLD AUTO: 2 %
BILIRUB DIRECT SERPL-MCNC: 6.2 MG/DL (ref 0–0.2)
BILIRUB SERPL-MCNC: 7.1 MG/DL (ref 0.2–1.3)
COPATH REPORT: NORMAL
CREAT SERPL-MCNC: 0.87 MG/DL (ref 0.52–1.04)
DIFFERENTIAL METHOD BLD: ABNORMAL
EOSINOPHIL # BLD AUTO: 0.4 10E9/L (ref 0–0.7)
EOSINOPHIL NFR BLD AUTO: 6 %
ERYTHROCYTE [DISTWIDTH] IN BLOOD BY AUTOMATED COUNT: 15.4 % (ref 10–15)
GFR SERPL CREATININE-BSD FRML MDRD: 64 ML/MIN/1.7M2
HCT VFR BLD AUTO: 27.8 % (ref 35–47)
HGB BLD-MCNC: 10 G/DL (ref 11.7–15.7)
LYMPHOCYTES # BLD AUTO: 1.5 10E9/L (ref 0.8–5.3)
LYMPHOCYTES NFR BLD AUTO: 22 %
MCH RBC QN AUTO: 36 PG (ref 26.5–33)
MCHC RBC AUTO-ENTMCNC: 36 G/DL (ref 31.5–36.5)
MCV RBC AUTO: 100 FL (ref 78–100)
MONOCYTES # BLD AUTO: 0.6 10E9/L (ref 0–1.3)
MONOCYTES NFR BLD AUTO: 9 %
NEUTROPHILS # BLD AUTO: 4.1 10E9/L (ref 1.6–8.3)
NEUTROPHILS NFR BLD AUTO: 61 %
PLATELET # BLD AUTO: 111 10E9/L (ref 150–450)
PLATELET # BLD EST: ABNORMAL 10*3/UL
PROT SERPL-MCNC: 5.9 G/DL (ref 6.8–8.8)
RBC # BLD AUTO: 2.78 10E12/L (ref 3.8–5.2)
RBC MORPH BLD: ABNORMAL
WBC # BLD AUTO: 6.7 10E9/L (ref 4–11)

## 2017-06-15 PROCEDURE — A9270 NON-COVERED ITEM OR SERVICE: HCPCS | Mod: GY | Performed by: HOSPITALIST

## 2017-06-15 PROCEDURE — 12000000 ZZH R&B MED SURG/OB

## 2017-06-15 PROCEDURE — 25000132 ZZH RX MED GY IP 250 OP 250 PS 637: Mod: GY | Performed by: PSYCHIATRY & NEUROLOGY

## 2017-06-15 PROCEDURE — 80076 HEPATIC FUNCTION PANEL: CPT | Performed by: INTERNAL MEDICINE

## 2017-06-15 PROCEDURE — 25000132 ZZH RX MED GY IP 250 OP 250 PS 637: Mod: GY | Performed by: INTERNAL MEDICINE

## 2017-06-15 PROCEDURE — 36415 COLL VENOUS BLD VENIPUNCTURE: CPT | Performed by: INTERNAL MEDICINE

## 2017-06-15 PROCEDURE — A9270 NON-COVERED ITEM OR SERVICE: HCPCS | Mod: GY | Performed by: INTERNAL MEDICINE

## 2017-06-15 PROCEDURE — 25000132 ZZH RX MED GY IP 250 OP 250 PS 637: Mod: GY | Performed by: HOSPITALIST

## 2017-06-15 PROCEDURE — 99233 SBSQ HOSP IP/OBS HIGH 50: CPT | Performed by: INTERNAL MEDICINE

## 2017-06-15 PROCEDURE — 99231 SBSQ HOSP IP/OBS SF/LOW 25: CPT | Performed by: INTERNAL MEDICINE

## 2017-06-15 PROCEDURE — 99232 SBSQ HOSP IP/OBS MODERATE 35: CPT | Performed by: PSYCHIATRY & NEUROLOGY

## 2017-06-15 PROCEDURE — A9270 NON-COVERED ITEM OR SERVICE: HCPCS | Mod: GY | Performed by: PSYCHIATRY & NEUROLOGY

## 2017-06-15 PROCEDURE — 82565 ASSAY OF CREATININE: CPT | Performed by: INTERNAL MEDICINE

## 2017-06-15 PROCEDURE — 99207 ZZC APP CREDIT; MD BILLING SHARED VISIT: CPT | Performed by: INTERNAL MEDICINE

## 2017-06-15 PROCEDURE — 85025 COMPLETE CBC W/AUTO DIFF WBC: CPT | Performed by: INTERNAL MEDICINE

## 2017-06-15 PROCEDURE — 25000128 H RX IP 250 OP 636: Performed by: INTERNAL MEDICINE

## 2017-06-15 RX ORDER — POLYETHYLENE GLYCOL 3350 17 G/17G
17 POWDER, FOR SOLUTION ORAL DAILY PRN
Status: DISCONTINUED | OUTPATIENT
Start: 2017-06-15 | End: 2017-06-16 | Stop reason: HOSPADM

## 2017-06-15 RX ORDER — AMOXICILLIN 250 MG
1-2 CAPSULE ORAL 2 TIMES DAILY PRN
Status: DISCONTINUED | OUTPATIENT
Start: 2017-06-15 | End: 2017-06-16 | Stop reason: HOSPADM

## 2017-06-15 RX ORDER — MIRTAZAPINE 15 MG/1
15 TABLET, FILM COATED ORAL AT BEDTIME
Status: DISCONTINUED | OUTPATIENT
Start: 2017-06-15 | End: 2017-06-16 | Stop reason: HOSPADM

## 2017-06-15 RX ORDER — QUETIAPINE FUMARATE 25 MG/1
25 TABLET, FILM COATED ORAL AT BEDTIME
Status: DISCONTINUED | OUTPATIENT
Start: 2017-06-15 | End: 2017-06-16 | Stop reason: HOSPADM

## 2017-06-15 RX ORDER — LORAZEPAM 2 MG/ML
.5-1 INJECTION INTRAMUSCULAR
Status: DISCONTINUED | OUTPATIENT
Start: 2017-06-15 | End: 2017-06-15

## 2017-06-15 RX ORDER — METOCLOPRAMIDE HYDROCHLORIDE 5 MG/ML
5 INJECTION INTRAMUSCULAR; INTRAVENOUS EVERY 6 HOURS PRN
Status: DISCONTINUED | OUTPATIENT
Start: 2017-06-15 | End: 2017-06-16 | Stop reason: HOSPADM

## 2017-06-15 RX ADMIN — LACTULOSE 10 G: 10 SOLUTION ORAL at 21:43

## 2017-06-15 RX ADMIN — SUCRALFATE 1 G: 1 SUSPENSION ORAL at 21:44

## 2017-06-15 RX ADMIN — RANITIDINE 150 MG: 150 TABLET ORAL at 21:43

## 2017-06-15 RX ADMIN — FOLIC ACID 1 MG: 1 TABLET ORAL at 09:57

## 2017-06-15 RX ADMIN — LORAZEPAM 1 MG: 2 INJECTION INTRAMUSCULAR; INTRAVENOUS at 03:18

## 2017-06-15 RX ADMIN — RANITIDINE 150 MG: 150 TABLET ORAL at 09:57

## 2017-06-15 RX ADMIN — SUCRALFATE 1 G: 1 SUSPENSION ORAL at 08:56

## 2017-06-15 RX ADMIN — SUCRALFATE 1 G: 1 SUSPENSION ORAL at 16:58

## 2017-06-15 RX ADMIN — MAGNESIUM OXIDE TAB 400 MG (241.3 MG ELEMENTAL MG) 400 MG: 400 (241.3 MG) TAB at 09:57

## 2017-06-15 RX ADMIN — GABAPENTIN 600 MG: 600 TABLET, FILM COATED ORAL at 21:43

## 2017-06-15 RX ADMIN — QUETIAPINE FUMARATE 25 MG: 25 TABLET, FILM COATED ORAL at 21:43

## 2017-06-15 RX ADMIN — MIRTAZAPINE 15 MG: 15 TABLET, FILM COATED ORAL at 21:43

## 2017-06-15 RX ADMIN — LACTULOSE 10 G: 10 SOLUTION ORAL at 09:56

## 2017-06-15 RX ADMIN — TRAMADOL HYDROCHLORIDE 50 MG: 50 TABLET, COATED ORAL at 01:10

## 2017-06-15 RX ADMIN — SUCRALFATE 1 G: 1 SUSPENSION ORAL at 11:45

## 2017-06-15 RX ADMIN — METOCLOPRAMIDE 5 MG: 5 INJECTION, SOLUTION INTRAMUSCULAR; INTRAVENOUS at 03:15

## 2017-06-15 RX ADMIN — Medication 100 MG: at 09:57

## 2017-06-15 RX ADMIN — VORTIOXETINE 5 MG: 5 TABLET, FILM COATED ORAL at 09:57

## 2017-06-15 RX ADMIN — THERA TABS 1 TABLET: TAB at 09:57

## 2017-06-15 RX ADMIN — GABAPENTIN 300 MG: 300 CAPSULE ORAL at 09:57

## 2017-06-15 NOTE — PROVIDER NOTIFICATION
MD Notification    Notified Person:  MD    Notified Persons Name: Fabiola    Notification Date/Time:6/15/17 0233    Notification Interaction:  Talked with Physician    Purpose of Notification:Pt c/o nausea, headache, tremors. Has PRN Zofran but states that it doesn't work for her requesting reglan.    Orders Received:Per MD    Comments: MD ordered Ativan and Reglan PRN ; see new orders

## 2017-06-15 NOTE — PLAN OF CARE
Problem: Goal Outcome Summary  Goal: Goal Outcome Summary  PT: Cancel at attempt this pm, pt agreeable to PT today, but requesting later time as she has visitor present. Reports she has already completed 3 longer walks with nursing staff today and completed shower task which was fatiguing. Educated pt that PT would check back as able or reschedule for tomorrow and pt verbalized understanding.

## 2017-06-15 NOTE — PROGRESS NOTES
[facility]  Gastroenterology Progress Note     Kavitha Headley MRN# [id number]   YOB: 1945 Age: 71 year old          Assessment and Plan:     Cellulitis   Clinically the same. Feeling little mor energy. C/O nausea last night.  W/U from Chronic liver disease fairly unremarkable. Finding consitent with Chronic ETOH liver disease with acute ETOH/sepsis/drug induced liver injury.  Repeat labs tomorrow including CBC, CMP,PT,INR.  Will continue albumin till tomorrow.  Appreciate greatly Psych input and help.  Will continue to follow along. Thrombocytopenia likely from portal HTN.                  Cellulitis of right lower extremity  Hypokalemia  Elevated LFTs  Thrombocytopenia (H)      Interval History:   no new complaints, denies chest pain, denies shortness of breath, alert, oriented to person, place and time and has had a bowel movement in the last 24 hours              Review of Systems:   C: NEGATIVE for fever, chills, change in weight  E/M: NEGATIVE for ear, mouth and throat problems  R: NEGATIVE for significant cough or SOB  CV: NEGATIVE for chest pain, palpitations or peripheral edema             Medications:   I have reviewed this patient's current medications    mirtazapine  15 mg Oral At Bedtime     QUEtiapine  25 mg Oral At Bedtime     lactulose  10 g Oral BID     sucralfate  1 g Oral 4x Daily AC & HS     ranitidine  150 mg Oral BID     magnesium oxide  400 mg Oral Daily     vitamin  B-1  100 mg Oral Daily     vortioxetine  5 mg Oral Daily     metoprolol  12.5 mg Oral Daily     gabapentin (NEURONTIN) tablet 600 mg  600 mg Oral At Bedtime     gabapentin (NEURONTIN) capsule 300 mg  300 mg Oral Daily     multivitamin, therapeutic  1 tablet Oral Daily     sodium chloride (PF)  3 mL Intracatheter Q8H     folic acid  1 mg Oral Daily                  Physical Exam:   Vitals were reviewed  Vital Signs with Ranges  Temp:  [97.5  F (36.4  C)-98.9  F (37.2  C)] 98.9  F (37.2  C)  Heart  Rate:  [] 103  Resp:  [16-18] 18  BP: (107-137)/(73-85) 137/82  SpO2:  [94 %-99 %] 94 %  I/O last 3 completed shifts:  In: -   Out: 3150 [Urine:3150]  Cardiovascular: negative, PMI normal. No lifts, heaves, or thrills. RRR. No murmurs, clicks gallops or rub  Respiratory: negative, Percussion normal. Good diaphragmatic excursion. Lungs clear  Head: Normocephalic. No masses, lesions, tenderness or abnormalities  Neck: Neck supple. No adenopathy. Thyroid symmetric, normal size,, Carotids without bruits.  Abdomen: Abdomen soft, non-tender. BS normal. No masses, organomegaly  SKIN: no suspicious lesions or rashes           Data:   I reviewed the patient's new clinical lab test results.   Recent Labs   Lab Test  06/14/17   0812 06/13/17   0800 06/12/17   0730  06/11/17   1540   06/10/17   0823   06/07/17   0720   WBC   --   7.2  5.9   --    --   7.2   < >  6.2   HGB   --   11.7  11.3*   --    --   11.3*   < >  11.3*   MCV   --   103*  100   --    --   100   < >  99   PLT  61*  64*  51*   --    < >  77*   < >  166   INR   --    --    --   1.15*   --   1.19*   --   1.02    < > = values in this interval not displayed.     Recent Labs   Lab Test  06/14/17   0812 06/13/17   0800 06/12/17   0730   POTASSIUM  4.2  4.7  4.5   CHLORIDE  97  96  98   CO2  33*  32  32   BUN  10  13  12   ANIONGAP  4  4  3     Recent Labs   Lab Test  06/14/17   0812 06/13/17   1350  06/13/17   0800  06/12/17   0730   06/09/17   1335   06/06/17   2300   11/06/10   1415   ALBUMIN  2.5*   --   2.4*  2.1*   < >   --    < >   --    < >  3.7   BILITOTAL  8.1*   --   8.8*  5.2*   < >   --    < >   --    < >  0.5   ALT  25   --   22  18   < >   --    < >   --    < >  35   AST  65*   --   71*  54*   < >   --    < >   --    < >  60*   PROTEIN   --   30*   --    --    --   10*   --   Negative   < >   --    LIPASE   --    --    --    --    --    --    --    --    --   139    < > = values in this interval not displayed.       I reviewed the patient's  new imaging results.    All laboratory data reviewed  All imaging studies reviewed by me.    Parth Graham MD,  6/15/2017  [facility]  Office : [phone]  Cell: [phone]

## 2017-06-15 NOTE — PROGRESS NOTES
Ms. Kavitha Headley is a 71-year-old female who has been admitted to the hospital with bilateral lower extremity cellulitis and abnormal liver function tests.  The patient has multiple medical problems, including hypertension, dyslipidemia and alcohol abuse.      Hematology is following for acute thrombocytopenia.  HIT has been ruled out.  Thrombocytopenia is multifactorial from combination of antibiotics, liver disease, alcohol abuse and low-grade DIC.     Patient is stable.  Denies any bleeding from ear, nose or throat.  No blood in the urine or stool.  No fever.  No chills.    CT of abdomen and pelvis was done on 06/14/2017.  There is diffuse fatty infiltration of liver.  No intrahepatic or extrahepatic biliary dilatation.  No pancreatic abnormality.  Spleen size is normal.  There is a scattered bilateral inguinal lymph node.  Largest is 2.6 cm.  It appears to have a fatty hilum.  Probably benign.     Labs: CBC ordered.     Assessment and plan:  1.  Acute thrombocytopenia from combination of decompensated liver disease, alcohol abuse, low-grade DIC.  -Monitor CBC.  Transfuse platelet if bleeding or platelet below 20.  -We will consider bone marrow biopsy if there is worsening of thrombocytopenia or develops new cytopenia.     2.  Alcohol abuse.  -Psychiatry following. Advised not to resume alcohol.       3.  Patient had few questions which were answered.  We will continue to follow her.

## 2017-06-15 NOTE — PROGRESS NOTES
[facility]    Hospitalist Progress Note    Assessment & Plan   Kavitha Headley is a 71 year old female with PMHx of hypertension, dyslipidemia, moderate aortic stenosis, depression, anxiety, EtOH use, chronic back pain, recent LE cellulitis and oral antibiotics who was admitted on 6/6/2017 with worsening LE erythema and swelling. Additionally, she was found to have abnl LFTs.    Abnl LFTs:  LFTs elevated on admission.   Suspected secondary to acute alcoholic hepatitis, possibly underlying liver disease or early cirrhosis though underwent additional workup given cholestatic picture (raising concerns for med related vs autoimmune vs obstructive)  US this stay showed no acute findings other than some fatty infiltration. No nodularities.   Viral hepatitis panel neg, antimitochondiral Ab neg, CMV neg, EBV IgG positive, HSV pending, iron and ferritin nl  Statin held on admission, was initially placed on Ancef as below, though that was subsequently dc'd  GI consulted -- seen by Dr. Graham  Had EGD and EUS on 6/12 which showed a friable distal esophagus which was biopsied; otherwise had nl appearing intrahepatic biliary ducts, CBD not well visualized, no obvious pancreatic mass  Ammonia has been at the high range of nl -- started on lactulose on 6/13  CT abd/pelvis obtained on 6/14 and showed diffuse fatty infiltration throughout the liver, no biliary duct dilation, no visible pancreatic abnl and some nonspecific inguinal lymphadenopathy    -- AST remains mildly elevated but stable, alk phos stable, t.biliruin (predominantly direct bili) trending up- unclear why given lack of findings on serial imaging studies  -- should remain off possibly offending medications  -- repeat CMP in AM  -- cont lactulose (no need to repeat ammonia level)    Bilateral LE Cellulitis vs Worsening Chronic Venous Stasis:  Recently treated for cellulitis prior to admission with clindamycin, then switched to minocycline and Keflex dt  reported intolerance. On admission, patient endorsed worsening LE erythema and swelling. Was afebrile, CBC nl. Bilateral US neg for DVT.   IV Vancomycin was started on admission -- seen by ID and was changed to Ancef on 6/7, this was ultimately stopped on 6/9, was then transitioned to clindamycin (ID recommended Keflex but this was not used given concerns for exacerbating LFTs)    -- completed course of antibiotic therapy on 6/13  -- mgmt of LE edema as below    Chronic Bilateral LE Edema:  PTA: Lasix 20mg po daily.   Edema likely dt underlying liver disease and low albumin. No evidence of pulmonary edema.   Echo this stay showed intact EF (>70%), though with impaired LV relaxation, moderate AS and a mildly dilated ascending aorta  Was given dose of IV Lasix on admission -- subsequent doses held given development of LYLY and hyponatremia  Was transiently given IVFs -- these were stopped on 6/11  Resumed on Lasix with albumin on 6/13 and was given 3 doses, renal function tolerating thus far, breathing improved    -- cont elevation of LEs, low Na diet, nutritional supplements    Acute Kidney Injury:  Cr reportedly normal prior to admission. Peaked at 1.4 this stay after initial diuresis  Seen by nephrology this stay -- felt she was intravascularly volume depleted and was started on NS.   Cr improved on NS but edema worsening, IVFs dc'd 6/11.    -- monitoring renal function with resumption of diuretics and albumin (given total of 3 doses from 6/13 - 6/14)    Thrombocytopenia:  Etiology not completely clear -- thought to be possibly secondary to underlying liver disease  Platelet count dropped as low as 51, has since started to improve  Lovenox dc'd, HIT Ab panel neg  Had associated elevated d-dimer, low fibrinogen and mildly elevated INR -- clinical picture concerning for possible mild DIC, discussed w/Dr. Pa, recommended BM biopsy if platelet count conts to drop    -- suspect thrombocytopenia likely multifactorial  -- dt liver disease, antibiotics, EtOH, low grade DIC  -- monitor daily CBC, transfuse platelets if <20 (or <50 if needing procedure)    EtOH Abuse:  Reported drinking 2-3 cocktails/night. Denied prior hx of withdrawal. Workup this stay suggestive of chronic EtOH use.   Was notably tremulous and unsteady on 6/8 and started on CIWA protocol -- no overt signs of withdrawal so this was dc'd.     -- psych following this stay -- recommended hospital-based inpatient stay for ongoing care, best option may be [facility]  -- cont thiamine/folate/MVI    Depression / Anxiety:  Psych following this stay.   Started on Trintellex and Seroquel, Seroquel stopped dt concern it may be contributing to elevated LFTs    -- medications have been adjusted -- per psych assessment on 6/15, conts on Trintellix and Seroquel 25mg HS plus 15mg TID prn; stopped trazodone and Restoril, Remeron dose decreased to 15mg HS  -- minimize use of prn Seroquel as able so as not to exacerbate abnl LFTs    Moderate Aortic Stenosis:  Noted on echo this stay.   Should follow up with cardiology after discharge    Hypertension:  Chronic and stable on Metoprolol    Dyslipidemia:  Holding statin this stay as shaylee given abnl LFTs    Insomnia:  Has prns    Chronic Back Pain:  On narcotic agreement.  Remains on gabapentin (300mg daily, 600mg HS) and prn Tramadol (though dose decreased to 25mg TID prn)    FEN: no IVFs, lytes stable, regular diet  DVT Prophylaxis: PCDs only, Lovenox stopped given thrombocytopenia  Code Status: Full Code    Disposition: Repeat labs in AM. Discharge pending clinical stability -- ?1-2 more days. Will have  evaluate [facility] inpatient stay at [facility] is an option (given her medical complexity), otherwise dc to TCU to regain strength, then ultimately [facility].    Nan Macias    Interval History   Complained of nausea and tremors overnight -- started on prn Reglan.   Seen this morning. Feeling okay -- appetite marginal with  intermittent nausea but has been trying to drink Ensure shakes. Also able to tolerate some yogurt earlier this morning. LE edema improved. Says BMs have been hard.    -Data reviewed today: I reviewed all new labs and imaging results over the last 24 hours. I personally reviewed no images or EKG's today.    Physical Exam   Temp: 98.9  F (37.2  C) Temp src: Oral BP: 107/73   Heart Rate: 102 Resp: 16 SpO2: 96 % O2 Device: None (Room air)    Vitals:    06/13/17 0626 06/14/17 0649 06/15/17 0702   Weight: 89.7 kg (197 lb 12 oz) 90.5 kg (199 lb 8.3 oz) 88.9 kg (195 lb 15.8 oz)     Vital Signs with Ranges  Temp:  [97.5  F (36.4  C)-98.9  F (37.2  C)] 98.9  F (37.2  C)  Heart Rate:  [] 102  Resp:  [16-18] 16  BP: (107-134)/(73-85) 107/73  SpO2:  [95 %-99 %] 96 %  I/O last 3 completed shifts:  In: 670 [P.O.:670]  Out: 2850 [Urine:2850]    Constitutional: Resting comfortably, alert and answering questions appropriately, NAD  Respiratory: CTAB, no wheeze/rales/rhonchi  Cardiovascular: HRRR w/+SM  GI: S, NT, ND, +BS  Skin/Integumen: +jaundiced, scattered ecchymosis on extremities  Other: +bilateral LE pitting edema to the knees    Medications        mirtazapine  15 mg Oral At Bedtime     QUEtiapine  25 mg Oral At Bedtime     lactulose  10 g Oral BID     sucralfate  1 g Oral 4x Daily AC & HS     ranitidine  150 mg Oral BID     magnesium oxide  400 mg Oral Daily     vitamin  B-1  100 mg Oral Daily     vortioxetine  5 mg Oral Daily     metoprolol  12.5 mg Oral Daily     gabapentin (NEURONTIN) tablet 600 mg  600 mg Oral At Bedtime     gabapentin (NEURONTIN) capsule 300 mg  300 mg Oral Daily     multivitamin, therapeutic  1 tablet Oral Daily     sodium chloride (PF)  3 mL Intracatheter Q8H     folic acid  1 mg Oral Daily       Data     Recent Labs  Lab 06/15/17  0822 06/14/17  0812 06/13/17  1445 06/13/17  0800 06/12/17  0730 06/11/17  1540  06/10/17  0823   WBC  --   --   --  7.2 5.9  --   --  7.2   HGB  --   --   --  11.7  11.3*  --   --  11.3*   MCV  --   --   --  103* 100  --   --  100   PLT  --  61*  --  64* 51*  --   < > 77*   INR  --   --   --   --   --  1.15*  --  1.19*   NA  --  134 131* 132* 133  --   < > 131*   POTASSIUM  --  4.2  --  4.7 4.5  --   < > 3.8   CHLORIDE  --  97  --  96 98  --   < > 92*   CO2  --  33*  --  32 32  --   < > 31   BUN  --  10  --  13 12  --   < > 20   CR 0.87 0.96 1.00 1.05* 0.92  --   < > 1.04   ANIONGAP  --  4  --  4 3  --   < > 8   BIRGIT  --  8.2*  --  8.4* 7.7*  --   < > 7.8*   GLC  --  74  --  88 75  --   < > 75   ALBUMIN  --  2.5*  --  2.4* 2.1*  --   < > 2.0*   PROTTOTAL  --  5.3*  --  5.7* 5.0*  --   < > 5.0*   BILITOTAL  --  8.1*  --  8.8* 5.2*  --   < > 4.2*   ALKPHOS  --  213*  --  262* 227*  --   < > 215*   ALT  --  25  --  22 18  --   < > 20   AST  --  65*  --  71* 54*  --   < > 66*   < > = values in this interval not displayed.    Recent Results (from the past 24 hour(s))   CT Abdomen Pelvis w Contrast    Narrative    CT ABDOMEN AND PELVIS WITH CONTRAST 6/14/2017 4:16 PM    HISTORY: 71-year-old patient with obstructive jaundice and  thrombocytopenia. Request made for evaluation of hepatobiliary tree,  pancreas, and splenomegaly.    COMPARISON: April 10, 2012.    TECHNIQUE: Axial and coronal CT images obtained from the lung bases  through the abdomen and pelvis after the uneventful administration of  Isovue-370 intravenous contrast given for a total of 50 mL. Radiation  dose for this scan was reduced using automated exposure control,  adjustment of the mA and/or kV according to patient size, or iterative  reconstruction technique.    FINDINGS: Bilateral breast implants are again identified, partially  visible. Lung bases are unremarkable. Heart size is normal without  pericardial effusion. Right total hip arthroplasty. Intervertebral  disc space narrowing throughout the lumbar spine. No acute osseous  abnormality.    Diffuse fatty infiltration throughout the liver, not readily  identified  on previous exam. Cholecystectomy clips. No evidence of  intrahepatic or extrahepatic biliary dilatation. No pancreatic ductal  dilatation. Postsurgical changes with gastrojejunostomy. Spleen is  normal in size measuring up to 9.4 cm. The adrenal glands and kidneys  appear normal.    Cortical thinning of the left kidney, compared to the right, though no  hydronephrosis. Bladder is partially distended and unremarkable.  Status post hysterectomy. No dilated loops of bowel. Scattered  bilateral inguinal lymph nodes incidentally identified, the largest of  which measures up to 2.6 x 1.4 cm, though appears to have a fatty  hilum. Patient did have variable degree of lymph nodes in similar  location on previous exam, suspect minimally larger on today's exam.      Impression    IMPRESSION:  1. Diffuse fatty infiltration throughout the liver. No evidence of  intrahepatic or extrahepatic biliary dilatation. No visible pancreatic  abnormality. Spleen is normal in size.  2. Cortical thinning of the left kidney, relative to the right, though  unchanged compared to previous exam.  3. Enlarged inguinal lymph nodes, though some of the larger lymph  nodes have fatty leilani. Lymph nodes may be minimally increased in size  compared to previous exam. They may be reactive, though nonspecific.    ZEYNEP CASTRO MD

## 2017-06-15 NOTE — PROGRESS NOTES
[facility]  Gastroenterology Progress Note     Kavitha Headley MRN# [id number]   YOB: 1945 Age: 71 year old          Assessment and Plan:   ETOH hepatitis and likely cirrhosis. Patient had EGD and EUS done yesterday. Patient appears awake ans skaky till. No astrixis. Patient had increase in LFTs today.   I will recommend to check  HSV studies are pending. EBV IgG positive.  CT scan today.  Psych consult for anxiety and Med changes if needed.   Continue on supportive care.   Continue on albumin.            Cellulitis of right lower extremity  Hypokalemia  Elevated LFTs  Thrombocytopenia (H)      Interval History:   no new complaints, denies shortness of breath, alert, oriented to person, place and time and has had a bowel movement in the last 24 hours              Review of Systems:   C: NEGATIVE for fever, chills, change in weight  E/M: NEGATIVE for ear, mouth and throat problems  R: NEGATIVE for significant cough or SOB  CV: NEGATIVE for chest pain, palpitations or peripheral edema             Medications:   I have reviewed this patient's current medications    mirtazapine  15 mg Oral At Bedtime     QUEtiapine  25 mg Oral At Bedtime     lactulose  10 g Oral BID     sucralfate  1 g Oral 4x Daily AC & HS     ranitidine  150 mg Oral BID     magnesium oxide  400 mg Oral Daily     vitamin  B-1  100 mg Oral Daily     vortioxetine  5 mg Oral Daily     metoprolol  12.5 mg Oral Daily     gabapentin (NEURONTIN) tablet 600 mg  600 mg Oral At Bedtime     gabapentin (NEURONTIN) capsule 300 mg  300 mg Oral Daily     multivitamin, therapeutic  1 tablet Oral Daily     sodium chloride (PF)  3 mL Intracatheter Q8H     folic acid  1 mg Oral Daily                  Physical Exam:   Vitals were reviewed  Vital Signs with Ranges  Temp:  [97.5  F (36.4  C)-98.9  F (37.2  C)] 98.9  F (37.2  C)  Heart Rate:  [] 103  Resp:  [16-18] 18  BP: (107-137)/(73-85) 137/82  SpO2:  [94 %-99 %] 94 %  I/O last 3  completed shifts:  In: -   Out: 3150 [Urine:3150]  Cardiovascular: negative, PMI normal. No lifts, heaves, or thrills. RRR. No murmurs, clicks gallops or rub  Respiratory: negative, Percussion normal. Good diaphragmatic excursion. Lungs clear  Head: Normocephalic. No masses, lesions, tenderness or abnormalities  Neck: Neck supple. No adenopathy. Thyroid symmetric, normal size,, Carotids without bruits.  Abdomen: Abdomen soft, non-tender. BS normal. No masses, organomegaly  SKIN: no suspicious lesions or rashes           Data:   I reviewed the patient's new clinical lab test results.   Recent Labs   Lab Test  06/14/17   0812  06/13/17   0800  06/12/17   0730  06/11/17   1540   06/10/17   0823   06/07/17   0720   WBC   --   7.2  5.9   --    --   7.2   < >  6.2   HGB   --   11.7  11.3*   --    --   11.3*   < >  11.3*   MCV   --   103*  100   --    --   100   < >  99   PLT  61*  64*  51*   --    < >  77*   < >  166   INR   --    --    --   1.15*   --   1.19*   --   1.02    < > = values in this interval not displayed.     Recent Labs   Lab Test  06/14/17   0812  06/13/17   0800  06/12/17   0730   POTASSIUM  4.2  4.7  4.5   CHLORIDE  97  96  98   CO2  33*  32  32   BUN  10  13  12   ANIONGAP  4  4  3     Recent Labs   Lab Test  06/14/17   0812  06/13/17   1350  06/13/17   0800  06/12/17   0730   06/09/17   1335   06/06/17   2300   11/06/10   1415   ALBUMIN  2.5*   --   2.4*  2.1*   < >   --    < >   --    < >  3.7   BILITOTAL  8.1*   --   8.8*  5.2*   < >   --    < >   --    < >  0.5   ALT  25   --   22  18   < >   --    < >   --    < >  35   AST  65*   --   71*  54*   < >   --    < >   --    < >  60*   PROTEIN   --   30*   --    --    --   10*   --   Negative   < >   --    LIPASE   --    --    --    --    --    --    --    --    --   139    < > = values in this interval not displayed.       I reviewed the patient's new imaging results.    All laboratory data reviewed  All imaging studies reviewed by me.    Parth CAMARGO  MD [facility],  6/15/2017  [facility]  Office : [phone]  Cell: [phone]

## 2017-06-15 NOTE — PLAN OF CARE
Problem: Goal Outcome Summary  Goal: Goal Outcome Summary  Outcome: Improving  A/Ox4. SBA/walker. Up to bathroom. VSS. Sats 95 on RA. CIWA, 3, 3. C/o generalized pain from arthritis, Tramadol given x1. LS dim. Ht murmur. Skin jaundiced, bruised. Mild LE edema. Reg diet. Frequent soft stools. IV lasix. I/Os.

## 2017-06-15 NOTE — PLAN OF CARE
Problem: Goal Outcome Summary  Goal: Goal Outcome Summary  Outcome: No Change  A and O.VSS. Ambulated in flores, assist 1 SBA, walker and gait. Incentive spirometer education, encourage use. Edema bilaterally lower extremities, yellow skin. C/o nausea and hard stools.

## 2017-06-15 NOTE — PROGRESS NOTES
HOSPITALIST Cross Cover    Called by RN about tremors, nausea. Chart reviewed.     Patient requesting reglan.    Will start reglan at 5 mg IV TID PRN.    Will also add prn ativan for nausea, tremors.    ELIDA GOINS MD

## 2017-06-15 NOTE — CONSULTS
[facility] Follow-up Psychiatric Consult Progress Note      Interval History:   Pt seen, chart reviewed, care reviewed with treatment team. Debra is very jaundiced, tremulous, not sleeping, and feels anxious. She is on multiple meds that could worsen this tremor, and due to her hepatic impairment dosage adjustment is required. She just started trintellex, but remeron, tramadol, trazadone and restoril are also on board. We discussed use of seroquek in low doses, stopping trazadone, and restoril which is not safe with her age and CD history. We will also lower tramadol and remeron. She seems willing to consider IP CD tx-[facility] was discussed as she has medicare and complex medical issues.    Review of systems:    10 point Review of Systems completed is negative other than noted in the HPI,  BP/P/temp, weight reviewed.     Medications:       mirtazapine  15 mg Oral At Bedtime     QUEtiapine  25 mg Oral At Bedtime     sodium chloride (PF)  10 mL Intracatheter Q8H     lactulose  10 g Oral BID     sucralfate  1 g Oral 4x Daily AC & HS     ranitidine  150 mg Oral BID     magnesium oxide  400 mg Oral Daily     vitamin  B-1  100 mg Oral Daily     vortioxetine  5 mg Oral Daily     metoprolol  12.5 mg Oral Daily     gabapentin (NEURONTIN) tablet 600 mg  600 mg Oral At Bedtime     gabapentin (NEURONTIN) capsule 300 mg  300 mg Oral Daily     multivitamin, therapeutic  1 tablet Oral Daily     sodium chloride (PF)  3 mL Intracatheter Q8H     folic acid  1 mg Oral Daily     metoclopramide, LORazepam, QUEtiapine, traMADol, lidocaine, sodium chloride (PF), miconazole, potassium chloride, potassium chloride, potassium chloride, potassium chloride with lidocaine, potassium chloride, magnesium sulfate, naloxone, lidocaine, lidocaine 4%, sodium chloride (PF), ondansetron **OR** ondansetron      Mental Status Examination:     Appearance:  awake, alert, adequately groomed, dressed in hospital scrubs and appeared older than  stated age, very jaundiced  Eye Contact:  good  Speech:  clear, coherent  Use of Language:Appropriate  Psychomotor Behavior:  tremor observed   Mood:  anxious  Affect:  intensity is heightened  Thought Process:  logical, linear and goal oriented no loose associations  Thought Content:  no evidence of suicidal ideation or homicidal ideation and no evidence of psychotic thought  Oriented to:  time, person, and place  Attention Span and Concentration:  fair  Recent and Remote Memory:  intact  Fund of Knowledge: appropriate  Muscle Strength and Tone: normal  Coordination, Station and Gait: Normal  Insight:  fair  Judgment:  fair        Labs/vitals:     Recent Results (from the past 24 hour(s))   Comprehensive metabolic panel    Collection Time: 06/14/17  8:12 AM   Result Value Ref Range    Sodium 134 133 - 144 mmol/L    Potassium 4.2 3.4 - 5.3 mmol/L    Chloride 97 94 - 109 mmol/L    Carbon Dioxide 33 (H) 20 - 32 mmol/L    Anion Gap 4 3 - 14 mmol/L    Glucose 74 70 - 99 mg/dL    Urea Nitrogen 10 7 - 30 mg/dL    Creatinine 0.96 0.52 - 1.04 mg/dL    GFR Estimate 57 (L) >60 mL/min/1.7m2    GFR Estimate If Black 69 >60 mL/min/1.7m2    Calcium 8.2 (L) 8.5 - 10.1 mg/dL    Bilirubin Total 8.1 (H) 0.2 - 1.3 mg/dL    Albumin 2.5 (L) 3.4 - 5.0 g/dL    Protein Total 5.3 (L) 6.8 - 8.8 g/dL    Alkaline Phosphatase 213 (H) 40 - 150 U/L    ALT 25 0 - 50 U/L    AST 65 (H) 0 - 45 U/L   Platelet count    Collection Time: 06/14/17  8:12 AM   Result Value Ref Range    Platelet Count 61 (L) 150 - 450 10e9/L   Ferritin    Collection Time: 06/14/17  8:12 AM   Result Value Ref Range    Ferritin 167 8 - 252 ng/mL   Iron and iron binding capacity    Collection Time: 06/14/17  8:12 AM   Result Value Ref Range    Iron 123 35 - 180 ug/dL    Iron Binding Cap 126 (L) 240 - 430 ug/dL    Iron Saturation Index 98 (H) 15 - 46 %   Vitamin B12    Collection Time: 06/14/17  8:12 AM   Result Value Ref Range    Vitamin B12 1832 (H) 193 - 986 pg/mL   Folate     Collection Time: 06/14/17  8:12 AM   Result Value Ref Range    Folate 26.9 >5.4 ng/mL   Lactate Dehydrogenase    Collection Time: 06/14/17  8:12 AM   Result Value Ref Range    Lactate Dehydrogenase 202 81 - 234 U/L   Bilirubin direct    Collection Time: 06/14/17  8:12 AM   Result Value Ref Range    Bilirubin Direct 6.9 (H) 0.0 - 0.2 mg/dL     B/P: 134/85, T: 97.5, P: 97, R: 16    Impression:   Kavitha appears anxious, but is very ill, hepatic impairment is significant with polypharnacy. We will utilize seroquel for sleep/anxiety in low doses-make downward adjustments in other meds      DIagnoses:   1. Alcohol use disorder, severe  2. Anxiety disorder NOS  3. Severe hepatic impairment and thrombocytopenia from alcohol use       Plan:   1. Written information given on medications. Side effects, risks, benefits reviewed.  2. Stop trazadone, restoril, and lower remeron to 15mg qhs, lower tramadol to 25mg bid prn  3. Continue trintellex 5mg qam, add seroquel 25mg qhs, 15.5mg tid prn/anxiety  4. Suggest IP hospital based CD tx when medically clear. [facility] may be best option      Attestation:  Patient has been seen and evaluated by me,  Moe Casillas MD

## 2017-06-15 NOTE — PROGRESS NOTES
D: RACHAEL following for DC planning.   I: [facility] declined pt. [facility] still has a bed. RACHAEL updated pt. Discussed possible transfer directly to [facility] for CD treatment per MD/psych suggestion. In order to be considered by  [facility] a patient must have a CD assessment or Rule 25 done first. Since pt is Medicare, she can only go to [facility] for this assessment. She will need to make an appointment to follow up with this as an out-pt. RACHAEL explained this to pt. She expressed an understanding. She is agreeable to [facility] for TCU first.   P: RACHAEL will follow.     Sara Nelson, LSW  [id number]

## 2017-06-15 NOTE — TELEPHONE ENCOUNTER
Pt has schedule OV 6/16/17 with Dr. Bartholomew, per [facility] notes, patient is currently inpatient.  Called patient to discuss appointment.  Per VM recording, number is invalid.  Will keep appt. At this time.

## 2017-06-15 NOTE — PLAN OF CARE
Problem: Goal Outcome Summary  Goal: Goal Outcome Summary  Outcome: Improving  A&Ox4. VSS on RA. Mild edema to [place] LE. Skin jaundiced.  C/o Nausea, HA, and  generalized pain. Noted to have Tremors to bilateral upper extremities, per patient tremors getting worse and thus unable to fall asleep. Reglan helpful for nausea. PRN Ativan and Tramadol helpful for Tremors & pain respectively. Voiding adequately. Up w/ SBA

## 2017-06-16 ENCOUNTER — APPOINTMENT (OUTPATIENT)
Dept: PHYSICAL THERAPY | Facility: CLINIC | Age: 72
DRG: 432 | End: 2017-06-16
Payer: MEDICARE

## 2017-06-16 VITALS
HEART RATE: 117 BPM | OXYGEN SATURATION: 98 % | TEMPERATURE: 98.4 F | SYSTOLIC BLOOD PRESSURE: 131 MMHG | WEIGHT: 198.41 LBS | DIASTOLIC BLOOD PRESSURE: 80 MMHG | RESPIRATION RATE: 18 BRPM | HEIGHT: 64 IN | BODY MASS INDEX: 33.87 KG/M2

## 2017-06-16 LAB
ALBUMIN SERPL-MCNC: 2.4 G/DL (ref 3.4–5)
ALP SERPL-CCNC: 154 U/L (ref 40–150)
ALT SERPL W P-5'-P-CCNC: 39 U/L (ref 0–50)
ANION GAP SERPL CALCULATED.3IONS-SCNC: 7 MMOL/L (ref 3–14)
AST SERPL W P-5'-P-CCNC: 89 U/L (ref 0–45)
BILIRUB SERPL-MCNC: 5.8 MG/DL (ref 0.2–1.3)
BUN SERPL-MCNC: 5 MG/DL (ref 7–30)
CALCIUM SERPL-MCNC: 8.1 MG/DL (ref 8.5–10.1)
CHLORIDE SERPL-SCNC: 98 MMOL/L (ref 94–109)
CO2 SERPL-SCNC: 32 MMOL/L (ref 20–32)
CREAT SERPL-MCNC: 0.74 MG/DL (ref 0.52–1.04)
ERYTHROCYTE [DISTWIDTH] IN BLOOD BY AUTOMATED COUNT: 15.3 % (ref 10–15)
GFR SERPL CREATININE-BSD FRML MDRD: 76 ML/MIN/1.7M2
GLUCOSE SERPL-MCNC: 89 MG/DL (ref 70–99)
HCT VFR BLD AUTO: 25.4 % (ref 35–47)
HGB BLD-MCNC: 9.2 G/DL (ref 11.7–15.7)
INR PPP: 1.03 (ref 0.86–1.14)
MCH RBC QN AUTO: 36.1 PG (ref 26.5–33)
MCHC RBC AUTO-ENTMCNC: 36.2 G/DL (ref 31.5–36.5)
MCV RBC AUTO: 100 FL (ref 78–100)
PLATELET # BLD AUTO: 106 10E9/L (ref 150–450)
POTASSIUM SERPL-SCNC: 3.5 MMOL/L (ref 3.4–5.3)
PROT SERPL-MCNC: 5.2 G/DL (ref 6.8–8.8)
RBC # BLD AUTO: 2.55 10E12/L (ref 3.8–5.2)
SODIUM SERPL-SCNC: 137 MMOL/L (ref 133–144)
WBC # BLD AUTO: 6.4 10E9/L (ref 4–11)

## 2017-06-16 PROCEDURE — 25000132 ZZH RX MED GY IP 250 OP 250 PS 637: Mod: GY | Performed by: HOSPITALIST

## 2017-06-16 PROCEDURE — A9270 NON-COVERED ITEM OR SERVICE: HCPCS | Mod: GY | Performed by: INTERNAL MEDICINE

## 2017-06-16 PROCEDURE — 80053 COMPREHEN METABOLIC PANEL: CPT | Performed by: INTERNAL MEDICINE

## 2017-06-16 PROCEDURE — 25000132 ZZH RX MED GY IP 250 OP 250 PS 637: Mod: GY | Performed by: INTERNAL MEDICINE

## 2017-06-16 PROCEDURE — 25000132 ZZH RX MED GY IP 250 OP 250 PS 637: Mod: GY | Performed by: PSYCHIATRY & NEUROLOGY

## 2017-06-16 PROCEDURE — 40000193 ZZH STATISTIC PT WARD VISIT: Performed by: PHYSICAL THERAPIST

## 2017-06-16 PROCEDURE — 97116 GAIT TRAINING THERAPY: CPT | Mod: GP | Performed by: PHYSICAL THERAPIST

## 2017-06-16 PROCEDURE — A9270 NON-COVERED ITEM OR SERVICE: HCPCS | Mod: GY | Performed by: HOSPITALIST

## 2017-06-16 PROCEDURE — 25000128 H RX IP 250 OP 636: Performed by: INTERNAL MEDICINE

## 2017-06-16 PROCEDURE — 85610 PROTHROMBIN TIME: CPT | Performed by: INTERNAL MEDICINE

## 2017-06-16 PROCEDURE — A9270 NON-COVERED ITEM OR SERVICE: HCPCS | Mod: GY | Performed by: PSYCHIATRY & NEUROLOGY

## 2017-06-16 PROCEDURE — 99239 HOSP IP/OBS DSCHRG MGMT >30: CPT | Performed by: INTERNAL MEDICINE

## 2017-06-16 PROCEDURE — 36415 COLL VENOUS BLD VENIPUNCTURE: CPT | Performed by: INTERNAL MEDICINE

## 2017-06-16 PROCEDURE — 97110 THERAPEUTIC EXERCISES: CPT | Mod: GP | Performed by: PHYSICAL THERAPIST

## 2017-06-16 PROCEDURE — 85027 COMPLETE CBC AUTOMATED: CPT | Performed by: INTERNAL MEDICINE

## 2017-06-16 RX ORDER — MIRTAZAPINE 15 MG/1
15 TABLET, FILM COATED ORAL AT BEDTIME
Qty: 30 TABLET | DISCHARGE
Start: 2017-06-16 | End: 2017-07-14

## 2017-06-16 RX ORDER — TRAMADOL HYDROCHLORIDE 50 MG/1
25 TABLET ORAL 2 TIMES DAILY PRN
Qty: 60 TABLET | Refills: 0 | Status: SHIPPED | OUTPATIENT
Start: 2017-06-16 | End: 2017-07-14

## 2017-06-16 RX ORDER — GABAPENTIN 300 MG/1
300 CAPSULE ORAL DAILY
Qty: 90 CAPSULE | DISCHARGE
Start: 2017-06-16 | End: 2017-08-21

## 2017-06-16 RX ORDER — LACTULOSE 10 G/15ML
10 SOLUTION ORAL 2 TIMES DAILY
Refills: 0 | DISCHARGE
Start: 2017-06-16 | End: 2017-07-07

## 2017-06-16 RX ORDER — SUCRALFATE ORAL 1 G/10ML
1 SUSPENSION ORAL
Qty: 1200 ML | DISCHARGE
Start: 2017-06-16 | End: 2017-08-21

## 2017-06-16 RX ORDER — LANOLIN ALCOHOL/MO/W.PET/CERES
100 CREAM (GRAM) TOPICAL DAILY
DISCHARGE
Start: 2017-06-16 | End: 2019-12-04

## 2017-06-16 RX ORDER — TRAMADOL HYDROCHLORIDE 50 MG/1
25 TABLET ORAL 2 TIMES DAILY PRN
Qty: 60 TABLET | Refills: 3 | Status: SHIPPED | DISCHARGE
Start: 2017-06-16 | End: 2017-06-16

## 2017-06-16 RX ORDER — QUETIAPINE FUMARATE 25 MG/1
12.5 TABLET, FILM COATED ORAL 3 TIMES DAILY PRN
Qty: 60 TABLET | DISCHARGE
Start: 2017-06-16 | End: 2017-06-27

## 2017-06-16 RX ORDER — MAGNESIUM OXIDE 400 MG/1
400 TABLET ORAL DAILY
Qty: 7 TABLET | DISCHARGE
Start: 2017-06-16 | End: 2019-12-04

## 2017-06-16 RX ORDER — AMOXICILLIN 250 MG
1-2 CAPSULE ORAL 2 TIMES DAILY PRN
Qty: 100 TABLET | DISCHARGE
Start: 2017-06-16 | End: 2021-03-23

## 2017-06-16 RX ORDER — METOPROLOL SUCCINATE 25 MG/1
12.5 TABLET, EXTENDED RELEASE ORAL DAILY
Qty: 90 TABLET | Refills: 3 | DISCHARGE
Start: 2017-06-16 | End: 2017-07-10

## 2017-06-16 RX ORDER — FOLIC ACID 1 MG/1
1 TABLET ORAL DAILY
Qty: 30 TABLET | DISCHARGE
Start: 2017-06-16 | End: 2019-12-04

## 2017-06-16 RX ORDER — POLYETHYLENE GLYCOL 3350 17 G/17G
17 POWDER, FOR SOLUTION ORAL DAILY PRN
Qty: 7 PACKET | DISCHARGE
Start: 2017-06-16 | End: 2021-03-23

## 2017-06-16 RX ORDER — QUETIAPINE FUMARATE 25 MG/1
25 TABLET, FILM COATED ORAL AT BEDTIME
Qty: 60 TABLET | DISCHARGE
Start: 2017-06-16 | End: 2017-06-27

## 2017-06-16 RX ADMIN — Medication 25 MG: at 03:05

## 2017-06-16 RX ADMIN — VORTIOXETINE 5 MG: 5 TABLET, FILM COATED ORAL at 08:26

## 2017-06-16 RX ADMIN — FOLIC ACID 1 MG: 1 TABLET ORAL at 08:25

## 2017-06-16 RX ADMIN — GABAPENTIN 300 MG: 300 CAPSULE ORAL at 08:25

## 2017-06-16 RX ADMIN — MAGNESIUM OXIDE TAB 400 MG (241.3 MG ELEMENTAL MG) 400 MG: 400 (241.3 MG) TAB at 08:25

## 2017-06-16 RX ADMIN — THERA TABS 1 TABLET: TAB at 08:25

## 2017-06-16 RX ADMIN — METOCLOPRAMIDE 5 MG: 5 INJECTION, SOLUTION INTRAMUSCULAR; INTRAVENOUS at 11:09

## 2017-06-16 RX ADMIN — LACTULOSE 10 G: 10 SOLUTION ORAL at 08:26

## 2017-06-16 RX ADMIN — Medication 12.5 MG: at 08:25

## 2017-06-16 RX ADMIN — SUCRALFATE 1 G: 1 SUSPENSION ORAL at 06:49

## 2017-06-16 RX ADMIN — Medication 100 MG: at 08:25

## 2017-06-16 RX ADMIN — RANITIDINE 150 MG: 150 TABLET ORAL at 08:25

## 2017-06-16 RX ADMIN — Medication 12.5 MG: at 03:05

## 2017-06-16 NOTE — PLAN OF CARE
Problem: Goal Outcome Summary  Goal: Goal Outcome Summary  Discharge Planner PT   Patient plan for discharge: TCU  Current status: Pt is indep with bed mobility, and mod indep with transfer from sit <> stand using FWW. Ambulates 350' with FWW and SBA, with no reports of fatigue, though she demos increased work of breathing and SOB. On return to room, pt O2 sats 98% with HR of 117 BPM. With standing LE exercise, pt demos fatigue, requiring a seated rest break and cueing for PLB for recovery.   Barriers to return to prior living situation: Fatigue with ambulation and transfers.   Recommendations for discharge: TCU  Rationale for recommendations: Pt would benefit from continued therapy in the TCU setting to increase endurance with ambulation of household distances, to increase functional strength of the LEs, and to decrease fatigue and SOB with transfers in order to decrease risk for falls.        Entered by: Pedro Monge 06/16/2017 12:51 PM

## 2017-06-16 NOTE — PLAN OF CARE
Problem: Goal Outcome Summary  Goal: Goal Outcome Summary  Outcome: Adequate for Discharge Date Met:  06/16/17  Pt is A/O x 4. Up with SBA. VSS on RA. LS clear. Pain lower back and hip, warm pack for relief were given. Nausea, Reglan x 1 with great relief. D/C to TCU.

## 2017-06-16 NOTE — DISCHARGE SUMMARY
[facility]    Discharge Summary  Hospitalist    Date of Admission:  6/6/2017  Date of Discharge:  6/16/2017  Discharging Provider: Nan Macias    Discharge Diagnoses   Abnl LFTs likely dt alcoholic liver disease  Bilateral LE Cellulitis vs Worsening Chronic Venous Stasis  Chronic Bilateral LE Edema  Acute Kidney Injury, resolved  Thrombocytopenia, improved  EtOH Abuse  Depression / Anxiety  Moderate Aortic Stenosis  Hypertension  Dyslipidemia  Insomnia  Chronic Back Pain    History of Present Illness   Kavitha Headley is a 71 year old female with PMHx of hypertension, dyslipidemia, moderate aortic stenosis, depression, anxiety, EtOH use, chronic back pain, recent LE cellulitis and oral antibiotics who was admitted on 6/6/2017 with worsening LE erythema and swelling. Additionally, she was found to have abnl LFTs.    Hospital Course   Kavitha Headley was admitted on 6/6/2017.  The following problems were addressed during her hospitalization:    Abnl LFTs, likely dt alcoholic liver disease:  LFTs elevated on admission. Suspected secondary to acute alcoholic hepatitis, possibly underlying liver disease or early cirrhosis though underwent additional workup given cholestatic picture (raising concerns for med related vs autoimmune vs obstructive)  RUQ US this stay showed no acute findings other than some fatty infiltration. No nodularities.   Viral hepatitis panel neg, antimitochondiral Ab neg, CMV neg, EBV IgG positive, HSV pending at time of discharge, iron and ferritin nl  Statin held on admission, was initially placed on Ancef as below, though that was subsequently dc'd  GI consulted -- seen by Dr. Graham  Underwent EGD and EUS on 6/12 which showed a friable distal esophagus which was biopsied and neg for neoplastic changes; otherwise had nl appearing intrahepatic biliary ducts, CBD not well visualized, no obvious pancreatic mass  Ammonia was noted to be at the high range of nl -- started  on lactulose on 6/13  CT abd/pelvis obtained on 6/14 and showed diffuse fatty infiltration throughout the liver, no biliary duct dilation, no visible pancreatic abnl and some nonspecific inguinal lymphadenopathy    -- LFTs improving at the time of discharge and INR stable (1.03), will need close monitoring given potential for medications to exacerbate abnl LFTs -- should have repeat CMP next week  -- conts on sucralfate and H2 blocker given findings on EGD  -- cont Lactulose  -- needs to abstain from EtOH  -- should follow up with Dr. Graham after discharge      Bilateral LE Cellulitis vs Worsening Chronic Venous Stasis:  Recently treated for cellulitis prior to admission with clindamycin, then switched to minocycline and Keflex dt reported intolerance. On admission, patient endorsed worsening LE erythema and swelling. Was afebrile, CBC nl. Bilateral US neg for DVT.   IV Vancomycin was started on admission -- seen by ID and was changed to Ancef on 6/7, this was ultimately stopped on 6/9, was then transitioned to clindamycin (ID recommended Keflex but this was not used given concerns for exacerbating LFTs)     -- completed course of antibiotic therapy on 6/13  -- mgmt of LE edema as below     Chronic Bilateral LE Edema:  PTA: Lasix 20mg po daily.   Edema likely dt underlying liver disease and low albumin. No evidence of pulmonary edema.   Echo this stay showed intact EF (>70%), though with impaired LV relaxation, moderate AS and a mildly dilated ascending aorta  Was given dose of IV Lasix on admission -- subsequent doses held given development of LYLY and hyponatremia. Was transiently given IVFs -- these were stopped on 6/11 given worsening of LE edema  Resumed on Lasix with albumin on 6/13 and was given 3 doses (given over the course of 6/13 - 6/14), renal function tolerated.     -- additional Lasix was not prescribed at discharge but may need to be considered in the future  -- recommended to continue to keep legs  elevated, follow low Na diet, cont nutritional supplements      Acute Kidney Injury: Resolved  Cr reportedly normal prior to admission. Peaked at 1.4 this stay after initial diuresis  Seen by nephrology this stay -- felt she was intravascularly volume depleted and was started on NS.   Cr improved on NS but edema worsening, IVFs dc'd 6/11.  Given Lasix and albumin from 6/13 - 6/14, renal function remained stable     -- renal function remained stable at time of discharge  -- if considering resumption of Lasix in the future will need close monitoring of renal function     Thrombocytopenia:  Etiology not completely clear -- thought to be possibly secondary to underlying liver disease, antibiotic use, EtOH and low grade DIC  Platelet count dropped as low as 51 on 6/12, has since started to improve  Peripheral smear showed macrocytosis and moderate thrombocytopenia, no blasts  Lovenox dc'd, HIT Ab panel neg  Had associated elevated d-dimer, low fibrinogen and mildly elevated INR -- clinical picture concerning for possible mild DIC, discussed w/Dr. Pa, recommended BM biopsy if platelet count conts to drop  GI suspects that liver disease is the underlying culprit    -- platelet count was improving at the time of discharge (100-110), should have repeat CBC next week  -- recommended to follow up with Dr. Pa      EtOH Abuse:  Reported drinking 2-3 cocktails/night. Denied prior hx of withdrawal. Workup this stay suggestive of chronic EtOH use.   Was notably tremulous and unsteady on 6/8 and started on CIWA protocol -- no overt signs of withdrawal so this was dc'd.      -- psych following this stay -- recommended hospital-based inpatient stay for ongoing care, best option may be [facility] -- patient will pursue this further after completion of rehab stay and improvement in strength  -- cont thiamine/folate/MVI     Depression / Anxiety:  Psych following this stay.   Started on Trintellex and Seroquel, Seroquel then  stopped stopped dt concern it may be contributing to elevated LFTs     -- medications have been adjusted this stay -- per psych assessment on 6/15, cont on Trintellix and Seroquel 25mg HS plus 15mg TID prn; stopped trazodone and Restoril, Remeron dose decreased to 15mg HS  -- patient discharged on this regimen  -- minimize use of prn Seroquel as able so as not to exacerbate abnl LFTs     Moderate Aortic Stenosis:  Noted on echo this stay.   Should follow up with cardiology after discharge.     Hypertension:  Chronic and stable on Metoprolol     Dyslipidemia:  Statin stopped this stay this stay given abnl LFTs     Insomnia:  Restoril and Trazodone were stopped this stay.      Chronic Back Pain:  On narcotic agreement.  Remains on gabapentin (300mg daily, 600mg HS) and prn Tramadol (though dose decreased to 25mg TID prn)    Nan Macias    Significant Results and Procedures   6/12/17 EUS per Dr. Graham  Findings:        Endoscopic Finding :        LA Grade C (one or more mucosal breaks continuous between tops of 2 or        more mucosal folds, less than 75% circumference) esophagitis with no        bleeding was found 30 cm from the incisors. Biopsies were taken with a        cold forceps for histology.        Evidence of a Otis-en-Y gastrojejunostomy was found. The gastrojejunal        anastomosis was characterized by congestion, edema, erosion, erythema,        inflammation and ulceration. This was traversed. The pouch-to-jejunum        limb was characterized by congestion. The duodenum-to-jejunum limb was        examined.        The examined jejunum was normal.        Endosonographic Finding :        Moderte portal gastropathy.        There was no sign of significant endosonographic abnormality in the        esophagus.        Endosonographic images of the stomach were unremarkable.        Endosonographic imaging of the pancreas showed sonographic changes        indicative of mild chronic pancreatitis in  the pancreatic body and in        the pancreatic tail. The parenchyma had hyperechoic foci, hypoechoic        foci and lobularity. The pancreatic duct had hyperechoic walls. The        pancreatic duct measured up to 2 mm in diameter.        No lymphadenopathy seen.        There was no sign of significant endosonographic abnormality in the        bifurcation of the common hepatic duct and in the intrahepatic bile        duct(s).        There was no sign of significant endosonographic abnormality in the left        lobe of the liver. Small amount oa parihepatic Ascites seen.                                                                                     Impression:                 - LA Grade C esophagitis. Biopsied.   - Otis-en-Y gastrojejunostomy with gastrojejunal anastomosis characterized by congestion, edema, erosion, erythema, inflammation and ulceration.    - Normal examined jejunum.   - There was no sign of significant pathology in the esophagus.   - Endosonographic images of the stomach were unremarkable.   - Endosonographic imaging of the pancreas showed sonographic changes suggestive of mild chronic pancreatitis.   - There was no sign of significant pathology in the bifurcation of the common hepatic duct and in the intrahepatic bile duct(s).   - There was no evidence of significant pathology in the left lobe of the liver.     Recommendation:             - Use sucralfate tablets 1 gram PO QID.   - I will contact patient with Biopsy result in 1-2 weeks. Please contact our Office at  at [facility] if ther is any questions.    -----------------------------------  6/6/17 Echocardiogram:  Interpretation Summary     Hyperdynamic left ventricular functionThe visual ejection fraction is  estimated at >70%.The transmitral spectral Doppler flow pattern is suggestive  of impaired LV relaxation.  The right ventricular systolic function is normal.  The left atrium is mild to moderately  dilated.  Moderate valvular aortic stenosis.Mean gradient 29 mm hg, RENE 1.1 cm2.  The ascending aorta is mildly dilated.     Compared to echo dated 08/04/2015 no singificant change, aortic valve  gradients and AV area appear similar.    Pending Results   These results will be followed up by PCP / GI  Unresulted Labs Ordered in the Past 30 Days of this Admission     Date and Time Order Name Status Description    6/13/2017 0000 HSV IgM antibody In process           Code Status   Full Code       Primary Care Physician   Alison Pena    Physical Exam   Temp: 98.4  F (36.9  C) Temp src: Oral BP: 131/80 Pulse: 86 Heart Rate: 85 Resp: 18 SpO2: 96 % O2 Device: None (Room air)    Vitals:    06/14/17 0649 06/15/17 0702 06/16/17 0500   Weight: 90.5 kg (199 lb 8.3 oz) 88.9 kg (195 lb 15.8 oz) 90 kg (198 lb 6.6 oz)     Vital Signs with Ranges  Temp:  [98.2  F (36.8  C)-98.4  F (36.9  C)] 98.4  F (36.9  C)  Pulse:  [86] 86  Heart Rate:  [] 85  Resp:  [16-18] 18  BP: (125-137)/(80-89) 131/80  SpO2:  [94 %-98 %] 96 %  I/O last 3 completed shifts:  In: 300 [P.O.:300]  Out: 1500 [Urine:1500]    General: Resting comfortably, alert, conversive, NAD  CVS: HRRR with +SM along sternal border  Respiratory: CTAB, no wheeze/rales/rhonchi, breathing non-labored  GI: S, NT, ND, +BS  Ext: +bilateral LE edema to the knee  Skin: Warm/dry, jaundice improved, scattered ecchymosis on upper extremities    Discharge Disposition   Discharged to short-term care facility  Condition at discharge: Stable    Consultations This Hospital Stay   INFECTIOUS DISEASES IP CONSULT  PSYCHIATRY IP CONSULT  GASTROENTEROLOGY IP CONSULT  NEPHROLOGY IP CONSULT  HEMATOLOGY & ONCOLOGY IP CONSULT    PHYSICAL THERAPY ADULT IP CONSULT  OCCUPATIONAL THERAPY ADULT IP CONSULT    Time Spent on this Encounter   INan, personally saw the patient today and spent greater than 30 minutes discharging this patient.    Discharge Orders     General info for SNF    Length of Stay Estimate: Short Term Care: Estimated # of Days <30  Condition at Discharge: Improving  Level of care:skilled   Rehabilitation Potential: Good  Admission H&P remains valid and up-to-date: Yes  Recent Chemotherapy: N/A  Use Nursing Home Standing Orders: N/A     Mantoux instructions   Give two-step Mantoux (PPD) Per Facility Policy Yes     Reason for your hospital stay   Evaluation of your lower extremity swelling and abnormal liver tests. It was determined that you likely have liver damage which is related to your alcohol use. Your medications were adjusted.     Follow Up and recommended labs and tests   1. Follow up with PCP or facility physician in 5-7 days with repeat CMP and CBC.  2. Follow up with Dr. Graham (gastroenterology) in clinic in 2-4 weeks.  3. Follow up with Dr. aP (hematology/oncology) in 2-4 weeks.     Activity - Up with nursing assistance     Daily weights   Call Provider for weight gain of more than 2 pounds per day or 5 pounds per week.     Full Code     Physical Therapy Adult Consult   Evaluate and treat as clinically indicated.    Reason:  deconditioning     Occupational Therapy Adult Consult   Evaluate and treat as clinically indicated.    Reason:  deconditioning     Advance Diet as Tolerated   Follow this diet upon discharge: 2g salt       Discharge Medications   Current Discharge Medication List      START taking these medications    Details   vortioxetine (TRINTELLIX/BRINTELLIX) 5 MG tablet Take 1 tablet (5 mg) by mouth daily    Associated Diagnoses: Depression, unspecified depression type      folic acid (FOLVITE) 1 MG tablet Take 1 tablet (1 mg) by mouth daily  Qty: 30 tablet    Associated Diagnoses: Alcohol abuse      thiamine 100 MG tablet Take 1 tablet (100 mg) by mouth daily    Associated Diagnoses: Alcohol abuse      sucralfate (CARAFATE) 1 GM/10ML suspension Take 10 mLs (1 g) by mouth 4 times daily (before meals and nightly)  Qty: 1200 mL    Associated Diagnoses:  Esophagitis      ranitidine (ZANTAC) 150 MG tablet Take 1 tablet (150 mg) by mouth 2 times daily  Qty: 60 tablet    Associated Diagnoses: Gastroesophageal reflux disease, esophagitis presence not specified; Esophagitis      magnesium oxide (MAG-OX) 400 MG tablet Take 1 tablet (400 mg) by mouth daily  Qty: 7 tablet    Associated Diagnoses: Low magnesium levels      !! QUEtiapine (SEROQUEL) 25 MG tablet Take 1 tablet (25 mg) by mouth At Bedtime  Qty: 60 tablet    Associated Diagnoses: Depression, unspecified depression type      lactulose (CHRONULAC) 10 GM/15ML solution Take 15 mLs (10 g) by mouth 2 times daily  Refills: 0    Associated Diagnoses: Elevated LFTs; Constipation, unspecified constipation type; Alcoholic fatty liver      polyethylene glycol (MIRALAX/GLYCOLAX) Packet Take 17 g by mouth daily as needed (constipation)  Qty: 7 packet    Associated Diagnoses: Constipation, unspecified constipation type      senna-docusate (SENOKOT-S;PERICOLACE) 8.6-50 MG per tablet Take 1-2 tablets by mouth 2 times daily as needed for constipation  Qty: 100 tablet    Associated Diagnoses: Constipation, unspecified constipation type      !! QUEtiapine (SEROQUEL) 25 MG tablet Take 0.5 tablets (12.5 mg) by mouth 3 times daily as needed (anxiety)  Qty: 60 tablet    Associated Diagnoses: Anxiety       !! - Potential duplicate medications found. Please discuss with provider.      CONTINUE these medications which have CHANGED    Details   mirtazapine (REMERON) 15 MG tablet Take 1 tablet (15 mg) by mouth At Bedtime  Qty: 30 tablet    Associated Diagnoses: Depression, unspecified depression type      metoprolol (TOPROL XL) 25 MG 24 hr tablet Take 0.5 tablets (12.5 mg) by mouth daily  Qty: 90 tablet, Refills: 3    Associated Diagnoses: PVC's (premature ventricular contractions)      traMADol (ULTRAM) 50 MG tablet Take 0.5 tablets (25 mg) by mouth 2 times daily as needed for moderate pain TAKE 1 TABLET BY MOUTH TWICE DAILY AS NEEDED FOR  MODERATE PAIN  Qty: 60 tablet, Refills: 3    Associated Diagnoses: Chronic pain syndrome; Controlled substance agreement signed      !! gabapentin (NEURONTIN) 300 MG capsule Take 1 capsule (300 mg) by mouth daily  Qty: 90 capsule    Associated Diagnoses: Chronic pain syndrome       !! - Potential duplicate medications found. Please discuss with provider.      CONTINUE these medications which have NOT CHANGED    Details   !! GABAPENTIN PO Take 600 mg by mouth At Bedtime      metoclopramide (REGLAN) 5 MG tablet Take 1 tablet (5 mg) by mouth 4 times daily as needed  Qty: 40 tablet, Refills: 0    Associated Diagnoses: Nausea      valACYclovir (VALTREX) 1000 mg tablet TAKE 2 TABS EVERY 12 HRS FOR 1 DAY ONLY FOR OUTBREAKS OF COLD SORES as needed  Qty: 4 tablet, Refills: 3    Associated Diagnoses: Herpes simplex virus infection      Multiple Vitamins-Minerals (CENTRUM SILVER) per tablet Take 1 tablet by mouth daily       !! - Potential duplicate medications found. Please discuss with provider.      STOP taking these medications       Potassium Chloride ER 20 MEQ TBCR Comments:   Reason for Stopping:         cephALEXin (KEFLEX) 500 MG capsule Comments:   Reason for Stopping:         minocycline (MINOCIN/DYNACIN) 100 MG capsule Comments:   Reason for Stopping:         furosemide (LASIX) 20 MG tablet Comments:   Reason for Stopping:         traZODone (DESYREL) 100 MG tablet Comments:   Reason for Stopping:         temazepam (RESTORIL) 15 MG capsule Comments:   Reason for Stopping:         naproxen (NAPROSYN) 500 MG tablet Comments:   Reason for Stopping:         atorvastatin (LIPITOR) 40 MG tablet Comments:   Reason for Stopping:         aspirin 81 MG tablet Comments:   Reason for Stopping:             Allergies   Allergies   Allergen Reactions     Bactrim [Sulfamethoxazole W/Trimethoprim] Hives     Codeine Itching     NAUSEA     Morphine Itching     NAUSEA     Data   Most Recent 3 CBC's:  Recent Labs   Lab Test  06/16/17    0845  06/15/17   1714  06/14/17   0812  06/13/17   0800   WBC  6.4  6.7   --   7.2   HGB  9.2*  10.0*   --   11.7   MCV  100  100   --   103*   PLT  106*  111*  61*  64*      Most Recent 3 BMP's:  Recent Labs   Lab Test  06/16/17   0845  06/15/17   0822  06/14/17   0812  06/13/17   1445  06/13/17   0800   NA  137   --   134  131*  132*   POTASSIUM  3.5   --   4.2   --   4.7   CHLORIDE  98   --   97   --   96   CO2  32   --   33*   --   32   BUN  5*   --   10   --   13   CR  0.74  0.87  0.96  1.00  1.05*   ANIONGAP  7   --   4   --   4   BIRGIT  8.1*   --   8.2*   --   8.4*   GLC  89   --   74   --   88     Most Recent 2 LFT's:  Recent Labs   Lab Test  06/16/17   0845  06/15/17   1714   AST  89*  96*   ALT  39  39   ALKPHOS  154*  196*   BILITOTAL  5.8*  7.1*     Most Recent INR's and Anticoagulation Dosing History:  Anticoagulation Dose History     Recent Dosing and Labs Latest Ref Rng & Units 11/7/2010 4/11/2012 9/29/2015 6/7/2017 6/10/2017 6/11/2017 6/16/2017    INR 0.86 - 1.14 0.89 0.93 0.86 1.02 1.19(H) 1.15(H) 1.03        Most Recent Cholesterol Panel:  Recent Labs   Lab Test  05/11/17   1255   CHOL  232*   LDL  98   HDL  114   TRIG  98     Most Recent 6 Bacteria Isolates From Any Culture (See EPIC Reports for Culture Details):  Recent Labs   Lab Test  06/06/17   1530  06/06/17   1457  04/30/14   1815  02/07/14   1445  04/11/12   1225  04/11/12   0603   CULT  No growth  No growth  >100,000 colonies/mL Escherichia coli*  >100,000 colonies/mL Mixed gram negative and positive johana Multiple species present, probable perineal contamination. Susceptibility testing not routinely done  Heavy growth Candida albicans Plus Light growth Normal respiratory johana  No growth after 6 days     Most Recent TSH, T4 and A1c Labs:  Recent Labs   Lab Test  05/11/16   1447   09/29/10   1110   TSH  1.33   < >   --    A1C   --    --   5.7    < > = values in this interval not displayed.     Results for orders placed or performed during  the hospital encounter of 06/06/17   US Lower Extremity Venous Duplex Bilateral    Narrative    ULTRASOUND LOWER EXTREMITY VENOUS DUPLEX BILATERAL   6/6/2017 3:49 PM     HISTORY: Swelling and redness.    COMPARISON: Ultrasound legs 2/1/2016.    FINDINGS: Gray-scale, color and Doppler spectral analysis ultrasound  was performed of the bilateral legs. Compression and augmentation  imaging was performed.    There is no evidence for deep venous thrombosis. Subcutaneous edema  identified bilaterally.      Impression    IMPRESSION: No evidence for DVT within either leg.    ORESTES BARRERA MD   US Abdomen Limited    Narrative    ULTRASOUND ABDOMEN LIMITED  6/7/2017 9:00 AM     HISTORY: Rule out liver pathology.    COMPARISON: 4/10/2012.    FINDINGS: The gallbladder is absent. There is abnormal increased  echogenicity of the liver with impaired acoustic transmission. Liver  size is normal. No focal liver lesion or intrahepatic biliary ductal  dilatation. Common bile duct measures 5 mm. Pancreas is completely  obscured by intestinal gas. The right kidney measures 9.7 cm in length  and appears normal.      Impression    IMPRESSION: Fatty infiltrated liver.    KELLEY MOORE MD   CT Abdomen Pelvis w Contrast    Narrative    CT ABDOMEN AND PELVIS WITH CONTRAST 6/14/2017 4:16 PM    HISTORY: 71-year-old patient with obstructive jaundice and  thrombocytopenia. Request made for evaluation of hepatobiliary tree,  pancreas, and splenomegaly.    COMPARISON: April 10, 2012.    TECHNIQUE: Axial and coronal CT images obtained from the lung bases  through the abdomen and pelvis after the uneventful administration of  Isovue-370 intravenous contrast given for a total of 50 mL. Radiation  dose for this scan was reduced using automated exposure control,  adjustment of the mA and/or kV according to patient size, or iterative  reconstruction technique.    FINDINGS: Bilateral breast implants are again identified, partially  visible. Lung bases  are unremarkable. Heart size is normal without  pericardial effusion. Right total hip arthroplasty. Intervertebral  disc space narrowing throughout the lumbar spine. No acute osseous  abnormality.    Diffuse fatty infiltration throughout the liver, not readily  identified on previous exam. Cholecystectomy clips. No evidence of  intrahepatic or extrahepatic biliary dilatation. No pancreatic ductal  dilatation. Postsurgical changes with gastrojejunostomy. Spleen is  normal in size measuring up to 9.4 cm. The adrenal glands and kidneys  appear normal.    Cortical thinning of the left kidney, compared to the right, though no  hydronephrosis. Bladder is partially distended and unremarkable.  Status post hysterectomy. No dilated loops of bowel. Scattered  bilateral inguinal lymph nodes incidentally identified, the largest of  which measures up to 2.6 x 1.4 cm, though appears to have a fatty  hilum. Patient did have variable degree of lymph nodes in similar  location on previous exam, suspect minimally larger on today's exam.      Impression    IMPRESSION:  1. Diffuse fatty infiltration throughout the liver. No evidence of  intrahepatic or extrahepatic biliary dilatation. No visible pancreatic  abnormality. Spleen is normal in size.  2. Cortical thinning of the left kidney, relative to the right, though  unchanged compared to previous exam.  3. Enlarged inguinal lymph nodes, though some of the larger lymph  nodes have fatty leilani. Lymph nodes may be minimally increased in size  compared to previous exam. They may be reactive, though nonspecific.    ZEYNEP CASTRO MD

## 2017-06-16 NOTE — PLAN OF CARE
Problem: Goal Outcome Summary  Goal: Goal Outcome Summary  Physical Therapy Discharge Summary     Reason for therapy discharge:    Discharged to transitional care facility.     Progress towards therapy goal(s). See goals on Care Plan in [facility] electronic health record for goal details.  Goals partially met.  Barriers to achieving goals:   discharge from facility.     Therapy recommendation(s):    Continued therapy is recommended.  Rationale/Recommendations:  Pt would benefit from cont therapy in the TCU setting in order to increase endurance with ambulation of household distance, and to increase LE functional strength and endurance in order to decrease risk for falls. .

## 2017-06-16 NOTE — PLAN OF CARE
Problem: Goal Outcome Summary  Goal: Goal Outcome Summary  Outcome: No Change  A&Ox4. VSS on RA. Up SBA with walker/gait belt to B/R. L/S diminished. +1 edema on RLE, +2 edema on LLE. Multiple bruises. Gave PRN tramadol for L hip pain, seroquel for anxiety, able to sleep after. Skin jaundiced. GI and psych following. Possible d/c to TCU today. Will continue to monitor.

## 2017-06-16 NOTE — PLAN OF CARE
Problem: Goal Outcome Summary  Goal: Goal Outcome Summary  Outcome: No Change  Pt alert and oriented x 3. Vital signs stable. SBA. IV saline lock. Skin color Jaundice. Multiple bruises on R & L upper extremities. Pt walking on hallway. Will continue to monitor.

## 2017-06-16 NOTE — PROGRESS NOTES
SWS PROGRESS NOTE:     I: Pt has orders to DC to [facility] today. SW faxed orders and confirmed receipt; pt has a bed available for her today in the TCU. SW will discuss transportation options with pt and arrange as needed.   P: Pt will DC to TCU today-[facility]. Pt's dtr will likely transport her on DC. SW following.     Valery Espana, MSW, LGSW [phone]    UPDATE 1100:   RACHAEL met with pt who was in her bed and dressed in clothing to DC. Pt's dtr will be at the hospital around 1145 to transport pt. SW confirmed receipt of DC orders with the admissions dept at [facility]. They have a bed for pt and are able to accommodate her after 1200 today. No PAS is needed.

## 2017-06-16 NOTE — PROGRESS NOTES
Message left with [facility] with cancellation of her appointment today. Left message she was going to [facility] for rehab.

## 2017-06-16 NOTE — CONSULTS
"[facility] Follow-up Psychiatric Consult Progress Note      Interval History:   Pt seen, chart reviewed, care reviewed with treatment team. Tolerating medications without side effects. Side effects, risks, and benefits of medications reviewed with patient. Pt reports she is \"yucky\" today. She was unable to sleep last night as Restoril was discontinued and Remeron was lowered. Dr. Vargas discussed the immediate negative effects of benzodiazapine use including increased fall risk and lowered IQ. Discussed addiction risk. Also mentioned the long-term detrimental effects including increased risk of dementia. Pt verbalized understanding. Contact Isaak at [facility] ([phone]) for Trintellix PA.  Pt is aware of her alcohol use and has a desire to quit. Discussed starting pt on Antabuse. Reviewed the side effects, benefits, and complications of medication. She declined to begin Antabuse. Increase Strongly encouraged pt to attend AA meetings on discharge. Increase Trintellix to 10mg qam. Pt cleared to discharge per Psychiatry.   Review of systems:    10 point Review of Systems completed is negative other than noted in the HPI,  BP/P/temp, weight reviewed.     Medications:       mirtazapine  15 mg Oral At Bedtime     QUEtiapine  25 mg Oral At Bedtime     lactulose  10 g Oral BID     sucralfate  1 g Oral 4x Daily AC & HS     ranitidine  150 mg Oral BID     magnesium oxide  400 mg Oral Daily     vitamin  B-1  100 mg Oral Daily     vortioxetine  5 mg Oral Daily     metoprolol  12.5 mg Oral Daily     gabapentin (NEURONTIN) tablet 600 mg  600 mg Oral At Bedtime     gabapentin (NEURONTIN) capsule 300 mg  300 mg Oral Daily     multivitamin, therapeutic  1 tablet Oral Daily     sodium chloride (PF)  3 mL Intracatheter Q8H     folic acid  1 mg Oral Daily     metoclopramide, QUEtiapine, traMADol, senna-docusate, polyethylene glycol, lidocaine, sodium chloride (PF), miconazole, potassium chloride, potassium " chloride, potassium chloride, potassium chloride with lidocaine, potassium chloride, magnesium sulfate, naloxone, lidocaine, lidocaine 4%, sodium chloride (PF), ondansetron **OR** ondansetron      Mental Status Examination:     Appearance:  awake, alert, adequately groomed, dressed in hospital scrubs and appeared older than stated age, very jaundiced  Eye Contact:  good  Speech:  clear, coherent  Use of Language:Appropriate  Psychomotor Behavior:  tremor observed   Mood:  less anxious  Affect:  mood congruent and intensity is normal  Thought Process:  logical, linear and goal oriented no loose associations  Thought Content:  no evidence of suicidal ideation or homicidal ideation and no evidence of psychotic thought  Oriented to:  time, person, and place  Attention Span and Concentration:  fair  Recent and Remote Memory:  intact  Fund of Knowledge: appropriate  Muscle Strength and Tone: normal  Coordination, Station and Gait: Normal  Insight:  fair  Judgment:  fair        Labs/vitals:     Recent Results (from the past 24 hour(s))   CBC with platelets differential    Collection Time: 06/15/17  5:14 PM   Result Value Ref Range    WBC 6.7 4.0 - 11.0 10e9/L    RBC Count 2.78 (L) 3.8 - 5.2 10e12/L    Hemoglobin 10.0 (L) 11.7 - 15.7 g/dL    Hematocrit 27.8 (L) 35.0 - 47.0 %     78 - 100 fl    MCH 36.0 (H) 26.5 - 33.0 pg    MCHC 36.0 31.5 - 36.5 g/dL    RDW 15.4 (H) 10.0 - 15.0 %    Platelet Count 111 (L) 150 - 450 10e9/L    Diff Method Manual Differential     % Neutrophils 61.0 %    % Lymphocytes 22.0 %    % Monocytes 9.0 %    % Eosinophils 6.0 %    % Basophils 2.0 %    Absolute Neutrophil 4.1 1.6 - 8.3 10e9/L    Absolute Lymphocytes 1.5 0.8 - 5.3 10e9/L    Absolute Monocytes 0.6 0.0 - 1.3 10e9/L    Absolute Eosinophils 0.4 0.0 - 0.7 10e9/L    Absolute Basophils 0.1 0.0 - 0.2 10e9/L    RBC Morphology Consistent with reported results     Platelet Estimate Confirming automated cell count    Hepatic panel    Collection  Time: 06/15/17  5:14 PM   Result Value Ref Range    Bilirubin Direct 6.2 (H) 0.0 - 0.2 mg/dL    Bilirubin Total 7.1 (H) 0.2 - 1.3 mg/dL    Albumin 2.9 (L) 3.4 - 5.0 g/dL    Protein Total 5.9 (L) 6.8 - 8.8 g/dL    Alkaline Phosphatase 196 (H) 40 - 150 U/L    ALT 39 0 - 50 U/L    AST 96 (H) 0 - 45 U/L     B/P: 134/85, T: 97.5, P: 97, R: 16    Impression:   Kavitha appears anxious, but is very ill, hepatic impairment is significant with polypharnacy. We will utilize seroquel for sleep/anxiety in low doses-make downward adjustments in other meds.    DIagnoses:   1. Alcohol use disorder, severe  2. Anxiety disorder NOS  3. Severe hepatic impairment and thrombocytopenia from alcohol use       Plan:   1. Written information given on medications. Side effects, risks, benefits reviewed.  2. Increase Trintellix to 10mg qam.  3. Suggest IP hospital based CD tx when medically clear. [facility] may be best option.  4. Pt cleared to discharge per Psychiatry.  5. Encouraged pt to maintain sobriety.      Attestation:  Patient has been seen and evaluated by me,  Colton Vargas MD

## 2017-06-20 LAB — HSV 1+2 IGM SER-IMP: 0.62 INDEX VALUE (ref 0–0.89)

## 2017-06-27 ENCOUNTER — HOSPITAL ENCOUNTER (OUTPATIENT)
Facility: CLINIC | Age: 72
Setting detail: SPECIMEN
Discharge: HOME | End: 2017-06-27
Attending: INTERNAL MEDICINE | Admitting: INTERNAL MEDICINE
Payer: MEDICARE

## 2017-06-27 ENCOUNTER — ONCOLOGY VISIT (OUTPATIENT)
Dept: ONCOLOGY | Facility: CLINIC | Age: 72
End: 2017-06-27
Attending: INTERNAL MEDICINE
Payer: COMMERCIAL

## 2017-06-27 ENCOUNTER — MEDICAL CORRESPONDENCE (OUTPATIENT)
Dept: HEALTH INFORMATION MANAGEMENT | Facility: CLINIC | Age: 72
End: 2017-06-27

## 2017-06-27 ENCOUNTER — RECORDS - HEALTHEAST (OUTPATIENT)
Dept: ADMINISTRATIVE | Facility: OTHER | Age: 72
End: 2017-06-27

## 2017-06-27 VITALS
HEART RATE: 92 BPM | TEMPERATURE: 97.9 F | OXYGEN SATURATION: 98 % | SYSTOLIC BLOOD PRESSURE: 113 MMHG | RESPIRATION RATE: 16 BRPM | DIASTOLIC BLOOD PRESSURE: 73 MMHG | BODY MASS INDEX: 33.33 KG/M2 | WEIGHT: 194.2 LBS

## 2017-06-27 DIAGNOSIS — K70.9 LIVER DISEASE, CHRONIC, DUE TO ALCOHOL (H): ICD-10-CM

## 2017-06-27 DIAGNOSIS — D69.6 THROMBOCYTOPENIA (H): Primary | ICD-10-CM

## 2017-06-27 LAB
ALBUMIN SERPL-MCNC: 2.4 G/DL (ref 3.4–5)
ALP SERPL-CCNC: 145 U/L (ref 40–150)
ALT SERPL W P-5'-P-CCNC: 35 U/L (ref 0–50)
ANION GAP SERPL CALCULATED.3IONS-SCNC: 4 MMOL/L (ref 3–14)
AST SERPL W P-5'-P-CCNC: 43 U/L (ref 0–45)
BASOPHILS # BLD AUTO: 0.1 10E9/L (ref 0–0.2)
BASOPHILS NFR BLD AUTO: 1.2 %
BILIRUB SERPL-MCNC: 2 MG/DL (ref 0.2–1.3)
BUN SERPL-MCNC: 5 MG/DL (ref 7–30)
CALCIUM SERPL-MCNC: 8.2 MG/DL (ref 8.5–10.1)
CHLORIDE SERPL-SCNC: 102 MMOL/L (ref 94–109)
CO2 SERPL-SCNC: 32 MMOL/L (ref 20–32)
CREAT SERPL-MCNC: 0.82 MG/DL (ref 0.52–1.04)
DIFFERENTIAL METHOD BLD: ABNORMAL
EOSINOPHIL # BLD AUTO: 0.5 10E9/L (ref 0–0.7)
EOSINOPHIL NFR BLD AUTO: 5.3 %
ERYTHROCYTE [DISTWIDTH] IN BLOOD BY AUTOMATED COUNT: 16.3 % (ref 10–15)
GFR SERPL CREATININE-BSD FRML MDRD: 68 ML/MIN/1.7M2
GLUCOSE SERPL-MCNC: 100 MG/DL (ref 70–99)
HCT VFR BLD AUTO: 26.3 % (ref 35–47)
HGB BLD-MCNC: 9 G/DL (ref 11.7–15.7)
IMM GRANULOCYTES # BLD: 0 10E9/L (ref 0–0.4)
IMM GRANULOCYTES NFR BLD: 0.2 %
LYMPHOCYTES # BLD AUTO: 1.6 10E9/L (ref 0.8–5.3)
LYMPHOCYTES NFR BLD AUTO: 18.2 %
MCH RBC QN AUTO: 35.7 PG (ref 26.5–33)
MCHC RBC AUTO-ENTMCNC: 34.2 G/DL (ref 31.5–36.5)
MCV RBC AUTO: 104 FL (ref 78–100)
MONOCYTES # BLD AUTO: 1.3 10E9/L (ref 0–1.3)
MONOCYTES NFR BLD AUTO: 14.2 %
NEUTROPHILS # BLD AUTO: 5.4 10E9/L (ref 1.6–8.3)
NEUTROPHILS NFR BLD AUTO: 60.9 %
NRBC # BLD AUTO: 0 10*3/UL
NRBC BLD AUTO-RTO: 0 /100
PLATELET # BLD AUTO: 641 10E9/L (ref 150–450)
POTASSIUM SERPL-SCNC: 3.6 MMOL/L (ref 3.4–5.3)
PROT SERPL-MCNC: 6.2 G/DL (ref 6.8–8.8)
RBC # BLD AUTO: 2.52 10E12/L (ref 3.8–5.2)
SODIUM SERPL-SCNC: 138 MMOL/L (ref 133–144)
WBC # BLD AUTO: 8.9 10E9/L (ref 4–11)

## 2017-06-27 PROCEDURE — 36415 COLL VENOUS BLD VENIPUNCTURE: CPT

## 2017-06-27 PROCEDURE — 80053 COMPREHEN METABOLIC PANEL: CPT | Performed by: INTERNAL MEDICINE

## 2017-06-27 PROCEDURE — 99214 OFFICE O/P EST MOD 30 MIN: CPT | Performed by: INTERNAL MEDICINE

## 2017-06-27 PROCEDURE — 85025 COMPLETE CBC W/AUTO DIFF WBC: CPT | Performed by: INTERNAL MEDICINE

## 2017-06-27 ASSESSMENT — PAIN SCALES - GENERAL: PAINLEVEL: MILD PAIN (2)

## 2017-06-27 NOTE — PATIENT INSTRUCTIONS
Labs today.- Drawn by GIOVANA Anderson  Follow up in 3 months with labs. Scheduled/Obdulia    AVS printed & given to patient/Obdulia

## 2017-06-27 NOTE — MR AVS SNAPSHOT
After Visit Summary   6/27/2017    Kavitha Headley    MRN: [id number]           Patient Information     Date Of Birth          1945        Visit Information        Provider Department      6/27/2017 3:00 PM Nahum Pa MD [facility]        Today's Diagnoses     Thrombocytopenia (H)    -  1    Liver disease, chronic, due to alcohol (H)          Care Instructions    Labs today.- Drawn by GIOVANA Anderson  Follow up in 3 months with labs.          Follow-ups after your visit        Your next 10 appointments already scheduled     Sep 20, 2017  1:30 PM CDT   Return Visit with  Oncology Nurse   [facility] ([facility])    [facility]  [address]  [place] [address] [phone]            Sep 20, 2017  2:00 PM CDT   Return Visit with Nahum Pa MD   [facility] ([facility])    [facility]  [address]  [place] [address] [phone]              Future tests that were ordered for you today     Open Future Orders        Priority Expected Expires Ordered    CBC with platelets differential Routine 9/27/2017 6/27/2018 6/27/2017            Who to contact     If you have questions or need follow up information about today's clinic visit or your schedule please contact [facility] directly at [phone].  Normal or non-critical lab and imaging results will be communicated to you by MyChart, letter or phone within 4 business days after the clinic has received the results. If you do not hear from us within 7 days, please contact the clinic through MyChart or phone. If you have a critical or abnormal lab result, we will notify you by phone as soon as possible.  Submit refill requests through [employer] or call your pharmacy and they will forward the refill request to us. Please allow 3 business days for your refill to be completed.          Additional  Information About Your Visit        [employer] Information     [employer] gives you secure access to your electronic health record. If you see a primary care provider, you can also send messages to your care team and make appointments. If you have questions, please call your primary care clinic.  If you do not have a primary care provider, please call [phone] and they will assist you.        Care EveryWhere ID     This is your Care EveryWhere ID. This could be used by other organizations to access your [place] medical records  [id number]        Your Vitals Were     Pulse Temperature Respirations Pulse Oximetry BMI (Body Mass Index)       92 97.9  F (36.6  C) (Oral) 16 98% 33.33 kg/m2        Blood Pressure from Last 3 Encounters:   06/27/17 113/73   06/16/17 131/80   06/02/17 107/75    Weight from Last 3 Encounters:   06/27/17 88.1 kg (194 lb 3.2 oz)   06/16/17 90 kg (198 lb 6.6 oz)   06/02/17 84.4 kg (186 lb)              We Performed the Following     CBC with platelets differential     Comprehensive metabolic panel          Today's Medication Changes          These changes are accurate as of: 6/27/17  3:43 PM.  If you have any questions, ask your nurse or doctor.               Stop taking these medicines if you haven't already. Please contact your care team if you have questions.     QUEtiapine 25 MG tablet   Commonly known as:  SEROquel                    Primary Care Provider Office Phone # Fax #    Alison Pena [phone]       [facility] [place] [address]  [place] [address]        Equal Access to Services     [facility]: Hadii aad ku hadasho Soaustyn, waaxda luqadaha, qaybta kaalmada sky huerta. So [facility] [phone].    ATENCIÓN: Si habla español, tiene a gaytan disposición servicios gratuitos de asistencia lingüística. Llame al [phone].    We comply with applicable federal civil rights laws and [place] laws. We do  not discriminate on the basis of race, color, national origin, age, disability sex, sexual orientation or gender identity.            Thank you!     Thank you for choosing [facility]  for your care. Our goal is always to provide you with excellent care. Hearing back from our patients is one way we can continue to improve our services. Please take a few minutes to complete the written survey that you may receive in the mail after your visit with us. Thank you!             Your Updated Medication List - Protect others around you: Learn how to safely use, store and throw away your medicines at www.disposemymeds.org.          This list is accurate as of: 6/27/17  3:43 PM.  Always use your most recent med list.                   Brand Name Dispense Instructions for use Diagnosis    CENTRUM SILVER per tablet      Take 1 tablet by mouth daily        folic acid 1 MG tablet    FOLVITE    30 tablet    Take 1 tablet (1 mg) by mouth daily    Alcohol abuse       * GABAPENTIN PO      Take 600 mg by mouth At Bedtime        * gabapentin 300 MG capsule    NEURONTIN    90 capsule    Take 1 capsule (300 mg) by mouth daily    Chronic pain syndrome       lactulose 10 GM/15ML solution    CHRONULAC     Take 15 mLs (10 g) by mouth 2 times daily    Elevated LFTs, Constipation, unspecified constipation type, Alcoholic fatty liver       magnesium oxide 400 MG tablet    MAG-OX    7 tablet    Take 1 tablet (400 mg) by mouth daily    Low magnesium levels       metoclopramide 5 MG tablet    REGLAN    40 tablet    Take 1 tablet (5 mg) by mouth 4 times daily as needed    Nausea       metoprolol 25 MG 24 hr tablet    TOPROL XL    90 tablet    Take 0.5 tablets (12.5 mg) by mouth daily    PVC's (premature ventricular contractions)       mirtazapine 15 MG tablet    REMERON    30 tablet    Take 1 tablet (15 mg) by mouth At Bedtime    Depression, unspecified depression type       polyethylene glycol Packet    MIRALAX/GLYCOLAX    7 packet     Take 17 g by mouth daily as needed (constipation)    Constipation, unspecified constipation type       ranitidine 150 MG tablet    ZANTAC    60 tablet    Take 1 tablet (150 mg) by mouth 2 times daily    Gastroesophageal reflux disease, esophagitis presence not specified, Esophagitis       RESTORIL PO      Take by mouth At Bedtime    Thrombocytopenia (H), Liver disease, chronic, due to alcohol (H)       senna-docusate 8.6-50 MG per tablet    SENOKOT-S;PERICOLACE    100 tablet    Take 1-2 tablets by mouth 2 times daily as needed for constipation    Constipation, unspecified constipation type       sucralfate 1 GM/10ML suspension    CARAFATE    1200 mL    Take 10 mLs (1 g) by mouth 4 times daily (before meals and nightly)    Esophagitis       thiamine 100 MG tablet      Take 1 tablet (100 mg) by mouth daily    Alcohol abuse       traMADol 50 MG tablet    ULTRAM    60 tablet    Take 0.5 tablets (25 mg) by mouth 2 times daily as needed for moderate pain TAKE 1 TABLET BY MOUTH TWICE DAILY AS NEEDED FOR MODERATE PAIN    Chronic pain syndrome, Controlled substance agreement signed       valACYclovir 1000 mg tablet    VALTREX    4 tablet    TAKE 2 TABS EVERY 12 HRS FOR 1 DAY ONLY FOR OUTBREAKS OF COLD SORES as needed    Herpes simplex virus infection       vortioxetine 5 MG tablet    TRINTELLIX/BRINTELLIX     Take 1 tablet (5 mg) by mouth daily    Depression, unspecified depression type       * Notice:  This list has 2 medication(s) that are the same as other medications prescribed for you. Read the directions carefully, and ask your doctor or other care provider to review them with you.

## 2017-06-27 NOTE — PROGRESS NOTES
"Oncology Rooming Note    June 27, 2017 2:52 PM   Kavitha Headley is a 71 year old female who presents for:    Chief Complaint   Patient presents with     Oncology Clinic Visit     Macrocytic anemia     Initial Vitals: /73 (BP Location: Left arm, Patient Position: Chair, Cuff Size: Adult Large)  Pulse 92  Temp 97.9  F (36.6  C) (Oral)  Resp 16  Wt 88.1 kg (194 lb 3.2 oz)  SpO2 98%  BMI 33.33 kg/m2 Estimated body mass index is 33.33 kg/(m^2) as calculated from the following:    Height as of 6/6/17: 1.626 m (5' 4\").    Weight as of this encounter: 88.1 kg (194 lb 3.2 oz). Body surface area is 1.99 meters squared.  Mild Pain (2) Comment: Data Unavailable   No LMP recorded. Patient has had a hysterectomy.  Allergies reviewed: Yes  Medications reviewed: Yes    Medications: Medication refills not needed today.  Pharmacy name entered into EPIC:    [facility]  [id number] - [place], [place] - [address]. AT [address] [place], [place] - [address]    Clinical concerns: None              5 minutes for nursing intake (face to face time)     Monica Hanna MA    Medical Assistant Note:  Kavitha Headley presents today for lab visit.    Patient seen by provider today: Yes: Dr. Pa.   present during visit today: Not Applicable.    Concerns: No Concerns.    Procedure:  Lab draw site: RAC, Needle type: BF, Gauge: 21 g gauze and coban applied.    Post Assessment:  Labs drawn without difficulty: Yes.    Discharge Plan:  Departure Mode: Ambulatory.    Face to Face Time: 5.    Monica Hanna MA    DISCHARGE PLAN:  Next appointments: See patient instruction section.  Brought the patient to the [facility] for scheduling.  Departure Mode: Ambulatory  Accompanied by: daughter Nery  4 minutes for nursing discharge (face to face time)   Monica Hanna MA    "

## 2017-06-29 ENCOUNTER — TRANSFERRED RECORDS (OUTPATIENT)
Dept: HEALTH INFORMATION MANAGEMENT | Facility: CLINIC | Age: 72
End: 2017-06-29

## 2017-07-03 ENCOUNTER — TELEPHONE (OUTPATIENT)
Dept: FAMILY MEDICINE | Facility: CLINIC | Age: 72
End: 2017-07-03

## 2017-07-03 NOTE — TELEPHONE ENCOUNTER
1.  5/9/17 prescription from Dr. Pena for #60, 3 refills    2.  6/27/17 prescription from Dr. Bernardo Norman, Tramadol 50 mg, #45, take 1/2 tab three times daily, his phone number [phone], was sent home with this prescription from rehab but only received #28 half pills and states that she has one pill (25 mg) left    States she has pain from thrombosed hemorrhoid, very painful. Rates pain 5/10 and also chronic pain from back pain    Cellulitis of lower legs was why she was admitted to the hospital and went to rehab.    3.  Called [facility] to verify status of 6/16/17 prescription, was dispensed to patient at discharge from hospital to [facility].     Called [facility] regarding multiple prescriptions for Tramadol, they are going to call the rehab facility to verify Tramadol use/status of medication that patient brought from hospital and call clinic with response.     WILMA Holguin

## 2017-07-03 NOTE — TELEPHONE ENCOUNTER
Please see 7/3/17 1:30 PM encounter information regarding patient's Tramadol prescription    [facility] called back, stated they talked with patient and decided they would fill Tramadol 50 mg 5 day supply (#10) until office visit Friday with PCP    6/27/17 rx has been cancelled by the Pharmacy also    WILMA Holguin

## 2017-07-03 NOTE — TELEPHONE ENCOUNTER
Reason for Call:  Medication or medication refill:    Do you use a [facility]?  Name of the pharmacy and phone number for the current request[facility]    Name of the medication requested: Tramadol    Other request: Pt was hospitalized recently and need pain medication.    Can we leave a detailed message on this number? YES    Phone number patient can be reached at: Home number on file [phone] (home)    Best Time: anytime    Call taken on 7/3/2017 at 1:07 PM by Urmila Peralta

## 2017-07-07 ENCOUNTER — OFFICE VISIT (OUTPATIENT)
Dept: FAMILY MEDICINE | Facility: CLINIC | Age: 72
End: 2017-07-07
Payer: COMMERCIAL

## 2017-07-07 ENCOUNTER — MEDICAL CORRESPONDENCE (OUTPATIENT)
Dept: HEALTH INFORMATION MANAGEMENT | Facility: CLINIC | Age: 72
End: 2017-07-07

## 2017-07-07 VITALS
RESPIRATION RATE: 14 BRPM | SYSTOLIC BLOOD PRESSURE: 82 MMHG | OXYGEN SATURATION: 97 % | HEIGHT: 64 IN | TEMPERATURE: 99.3 F | WEIGHT: 186 LBS | DIASTOLIC BLOOD PRESSURE: 51 MMHG | BODY MASS INDEX: 31.76 KG/M2

## 2017-07-07 DIAGNOSIS — Z09 HOSPITAL DISCHARGE FOLLOW-UP: Primary | ICD-10-CM

## 2017-07-07 DIAGNOSIS — F10.10 ALCOHOL ABUSE: ICD-10-CM

## 2017-07-07 DIAGNOSIS — N17.9 ACUTE RENAL FAILURE, UNSPECIFIED ACUTE RENAL FAILURE TYPE (H): ICD-10-CM

## 2017-07-07 DIAGNOSIS — D53.9 MACROCYTIC ANEMIA: ICD-10-CM

## 2017-07-07 DIAGNOSIS — D69.6 THROMBOCYTOPENIA (H): ICD-10-CM

## 2017-07-07 DIAGNOSIS — L03.115 BILATERAL LOWER LEG CELLULITIS: ICD-10-CM

## 2017-07-07 DIAGNOSIS — L03.116 BILATERAL LOWER LEG CELLULITIS: ICD-10-CM

## 2017-07-07 DIAGNOSIS — R94.5 NONSPECIFIC ABNORMAL RESULTS OF LIVER FUNCTION STUDY: ICD-10-CM

## 2017-07-07 LAB
ALBUMIN SERPL-MCNC: 2.2 G/DL (ref 3.4–5)
ALP SERPL-CCNC: 133 U/L (ref 40–150)
ALT SERPL W P-5'-P-CCNC: 25 U/L (ref 0–50)
ANION GAP SERPL CALCULATED.3IONS-SCNC: 6 MMOL/L (ref 3–14)
AST SERPL W P-5'-P-CCNC: 33 U/L (ref 0–45)
BASOPHILS # BLD AUTO: 0.1 10E9/L (ref 0–0.2)
BASOPHILS NFR BLD AUTO: 0.6 %
BILIRUB SERPL-MCNC: 1.7 MG/DL (ref 0.2–1.3)
BUN SERPL-MCNC: 16 MG/DL (ref 7–30)
CALCIUM SERPL-MCNC: 8.6 MG/DL (ref 8.5–10.1)
CHLORIDE SERPL-SCNC: 99 MMOL/L (ref 94–109)
CO2 SERPL-SCNC: 31 MMOL/L (ref 20–32)
CREAT SERPL-MCNC: 1.34 MG/DL (ref 0.52–1.04)
DIFFERENTIAL METHOD BLD: ABNORMAL
EOSINOPHIL # BLD AUTO: 1.5 10E9/L (ref 0–0.7)
EOSINOPHIL NFR BLD AUTO: 17 %
ERYTHROCYTE [DISTWIDTH] IN BLOOD BY AUTOMATED COUNT: 15.5 % (ref 10–15)
GFR SERPL CREATININE-BSD FRML MDRD: 39 ML/MIN/1.7M2
GLUCOSE SERPL-MCNC: 72 MG/DL (ref 70–99)
HCT VFR BLD AUTO: 28.7 % (ref 35–47)
HGB BLD-MCNC: 9.5 G/DL (ref 11.7–15.7)
LYMPHOCYTES # BLD AUTO: 1.3 10E9/L (ref 0.8–5.3)
LYMPHOCYTES NFR BLD AUTO: 15.4 %
MCH RBC QN AUTO: 34.1 PG (ref 26.5–33)
MCHC RBC AUTO-ENTMCNC: 33.1 G/DL (ref 31.5–36.5)
MCV RBC AUTO: 103 FL (ref 78–100)
MONOCYTES # BLD AUTO: 1 10E9/L (ref 0–1.3)
MONOCYTES NFR BLD AUTO: 11.3 %
NEUTROPHILS # BLD AUTO: 4.8 10E9/L (ref 1.6–8.3)
NEUTROPHILS NFR BLD AUTO: 55.7 %
PLATELET # BLD AUTO: 279 10E9/L (ref 150–450)
POTASSIUM SERPL-SCNC: 4.4 MMOL/L (ref 3.4–5.3)
PROT SERPL-MCNC: 6.3 G/DL (ref 6.8–8.8)
RBC # BLD AUTO: 2.79 10E12/L (ref 3.8–5.2)
SODIUM SERPL-SCNC: 136 MMOL/L (ref 133–144)
WBC # BLD AUTO: 8.7 10E9/L (ref 4–11)

## 2017-07-07 PROCEDURE — 85025 COMPLETE CBC W/AUTO DIFF WBC: CPT | Performed by: FAMILY MEDICINE

## 2017-07-07 PROCEDURE — 99215 OFFICE O/P EST HI 40 MIN: CPT | Performed by: FAMILY MEDICINE

## 2017-07-07 PROCEDURE — 36415 COLL VENOUS BLD VENIPUNCTURE: CPT | Performed by: FAMILY MEDICINE

## 2017-07-07 PROCEDURE — 80053 COMPREHEN METABOLIC PANEL: CPT | Performed by: FAMILY MEDICINE

## 2017-07-07 NOTE — NURSING NOTE
"Chief Complaint   Patient presents with     Hospital F/U       Initial BP (!) 82/51 (Cuff Size: Adult Large)  Temp 99.3  F (37.4  C) (Tympanic)  Resp 14  Ht 5' 4\" (1.626 m)  Wt 186 lb (84.4 kg)  SpO2 97%  BMI 31.93 kg/m2 Estimated body mass index is 31.93 kg/(m^2) as calculated from the following:    Height as of this encounter: 5' 4\" (1.626 m).    Weight as of this encounter: 186 lb (84.4 kg).  Medication Reconciliation: complete   Vicky Montgomery, CMA    "

## 2017-07-07 NOTE — PROGRESS NOTES
SUBJECTIVE:                                                    Kavitha Headley is a 71 year old female who presents to clinic today for the following health issues:          Hospital Follow-up Visit:    Hospital/Nursing Home/IP Rehab Facility: [facility]  Date of Admission: 6/6/17  Date of Discharge: 6/15/17  Reason(s) for Admission: Cellulitis of the legs.             Problems taking medications regularly:  None       Medication changes since discharge: None       Problems adhering to non-medication therapy:  None    Summary of hospitalization:  [facility] discharge summary reviewed  Patient has since the discharge stayed at TCU and also followed up by GI.  Diagnostic Tests/Treatments reviewed.  Follow up needed: none  Other Healthcare Providers Involved in Patient s Care: GI and hematology.           Update since discharge: improved.     Post Discharge Medication Reconciliation: discharge medications reconciled, continue medications without change.  Plan of care communicated with patient     Coding guidelines for this visit:  Type of Medical   Decision Making Face-to-Face Visit       within 7 Days of discharge Face-to-Face Visit        within 14 days of discharge   Moderate Complexity [id number]   High Complexity 85661 64866                Problem list and histories reviewed & adjusted, as indicated.  Additional history: as documented    Patient Active Problem List   Diagnosis     Hyperlipidemia LDL goal <130     Spinal stenosis     Chronic insomnia     Alcohol abuse     CKD (chronic kidney disease) stage 3, GFR 30-59 ml/min     Hip joint replacement status     Knee joint replacement status     Chronic pain syndrome     Bariatric surgery status     Personal history of urinary calculi     Macrocytic anemia     Anxiety     Aortic stenosis     Left-sided low back pain without sciatica     ACP (advance care planning)     PAC (premature atrial contraction)     Gastroesophageal reflux disease,  esophagitis presence not specified     Controlled substance agreement signed     Seasonal affective disorder (H)     HTN, goal below 140/90     Bilateral lower leg cellulitis     Hypokalemia     Hyponatremia     Cellulitis     Past Surgical History:   Procedure Laterality Date     APPENDECTOMY  3/2004    incidental     C GASTRIC BYPASS,OBESE<100CM ARIANNA-EN-Y  1996     C MEDIASTINOSCOPY W OR WO BIOPSY  2/2008    Videomediastinoscopy and, for mediastinal adenopathy -reactive lymphoid hyperplasia     C REPAIR OF RECTOCELE  3/2012     C TOTAL KNEE ARTHROPLASTY  12/2005    left      CARPAL TUNNEL RELEASE RT/LT  10/2010    Carpometacarpal excisional arthroplasty with a fascial autograft and APL suspension sling (32763). 2. Left thumb metacarpophalangeal joint fusion with autologous bone graft (43175). 3. Left endoscopic carpal tunnel release      CHOLECYSTECTOMY, LAPOROSCOPIC  11/2010    Cholecystectomy, Laparoscopic     COLONOSCOPY N/A 9/8/2016    Procedure: COMBINED COLONOSCOPY, SINGLE OR MULTIPLE BIOPSY/POLYPECTOMY BY BIOPSY;  Surgeon: Moe Barlow MD;  Location:  GI     CYSTOCELE REPAIR  11/2012    davinc laparoscopic sacrocolpopexy, enterocele repair, lysis of adhesions, placement of retropubic mid urethral sling, cystoscopy     CYSTOSCOPY, LITHOTRIPSY, COMBINED  6/2006    Left extracorporeal shock wave lithotripsy, cystoscopy, left ureteral stent placement.     CYSTOSCOPY, REMOVE STENT(S), COMBINED  7/2006    Cystoscopy, removal of left ureteral stent, retrograde pyelography, flexible and rigid ureteroscopy and holmium laser lithotripsy, basket removal of stone fragments, ureteral stent placement.      ENDOSCOPIC ULTRASOUND UPPER GASTROINTESTINAL TRACT (GI) N/A 6/12/2017    Procedure: ENDOSCOPIC ULTRASOUND, ESOPHAGOSCOPY / UPPER GASTROINTESTINAL TRACT (GI);  ENDOSCOPIC ULTRASOUND, ESOPHAGOSCOPY / UPPER GASTROINTESTINAL TRACT (GI);  Surgeon: Parth Graham MD;  Location:  GI     HERNIA REPAIR  4/2012     bilateral augmentation mastopexy, ventral hernia repair, and medial thigh liposuction on 04/06/2012.      HYSTERECTOMY VAGINAL, BILATERAL SALPINGO-OOPHERECTOMY, COMBINED  1998    due to myoma and bleeding     JOINT REPLACEMENT, HIP RT/LT  4/2004    right total hip arthroplasty     LAPAROTOMY, LYSIS ADHESIONS, COMBINED  3/2004    lysis adhesions, ventral hernia repair, appendectomy incidentally     LYMPH NODE BIOPSY  4/2008    right axillary, reactive follicular and paracortical hyperplasia.     MAMMOPLASTY AUGMENTATION BILATERAL  4/2012     REVISE RECONSTRUCTED BREAST  6/7/2012    Left breast capsulotomy.        Social History   Substance Use Topics     Smoking status: Never Smoker     Smokeless tobacco: Never Used     Alcohol use 0.0 oz/week     0 Standard drinks or equivalent per week      Comment: 2-3 drinks per day     Family History   Problem Relation Age of Onset     Substance Abuse Father      CANCER Father      throat and lung mets     DIABETES No family hx of      Coronary Artery Disease No family hx of      CEREBROVASCULAR DISEASE No family hx of          Current Outpatient Prescriptions   Medication Sig Dispense Refill     Temazepam (RESTORIL PO) Take by mouth At Bedtime       mirtazapine (REMERON) 15 MG tablet Take 1 tablet (15 mg) by mouth At Bedtime 30 tablet      vortioxetine (TRINTELLIX/BRINTELLIX) 5 MG tablet Take 1 tablet (5 mg) by mouth daily       folic acid (FOLVITE) 1 MG tablet Take 1 tablet (1 mg) by mouth daily 30 tablet      thiamine 100 MG tablet Take 1 tablet (100 mg) by mouth daily       sucralfate (CARAFATE) 1 GM/10ML suspension Take 10 mLs (1 g) by mouth 4 times daily (before meals and nightly) 1200 mL      ranitidine (ZANTAC) 150 MG tablet Take 1 tablet (150 mg) by mouth 2 times daily 60 tablet      magnesium oxide (MAG-OX) 400 MG tablet Take 1 tablet (400 mg) by mouth daily 7 tablet      gabapentin (NEURONTIN) 300 MG capsule Take 1 capsule (300 mg) by mouth daily 90 capsule       "polyethylene glycol (MIRALAX/GLYCOLAX) Packet Take 17 g by mouth daily as needed (constipation) 7 packet      senna-docusate (SENOKOT-S;PERICOLACE) 8.6-50 MG per tablet Take 1-2 tablets by mouth 2 times daily as needed for constipation 100 tablet      traMADol (ULTRAM) 50 MG tablet Take 0.5 tablets (25 mg) by mouth 2 times daily as needed for moderate pain TAKE 1 TABLET BY MOUTH TWICE DAILY AS NEEDED FOR MODERATE PAIN 60 tablet 0     metoclopramide (REGLAN) 5 MG tablet Take 1 tablet (5 mg) by mouth 4 times daily as needed 40 tablet 0     valACYclovir (VALTREX) 1000 mg tablet TAKE 2 TABS EVERY 12 HRS FOR 1 DAY ONLY FOR OUTBREAKS OF COLD SORES as needed 4 tablet 3     Multiple Vitamins-Minerals (CENTRUM SILVER) per tablet Take 1 tablet by mouth daily       clonazePAM (KLONOPIN) 0.5 MG tablet Take 0.5-1 tablets (0.25-0.5 mg) by mouth daily as needed (for alcohol withdrawl) 10 tablet 0     Allergies   Allergen Reactions     Bactrim [Sulfamethoxazole W/Trimethoprim] Hives     Codeine Itching     NAUSEA     Morphine Itching     NAUSEA       Reviewed and updated as needed this visit by clinical staff       Reviewed and updated as needed this visit by Provider         ROS:  C: NEGATIVE for fever, chills, change in weight  GI: NEGATIVE for nausea, abdominal pain, heartburn, or change in bowel habits    OBJECTIVE:                                                    BP (!) 82/51 (Cuff Size: Adult Large)  Temp 99.3  F (37.4  C) (Tympanic)  Resp 14  Ht 5' 4\" (1.626 m)  Wt 186 lb (84.4 kg)  SpO2 97%  BMI 31.93 kg/m2  Body mass index is 31.93 kg/(m^2).   GENERAL: healthy, alert, well nourished, well hydrated, no distress  HENT: ear canals- normal; TMs- normal; Nose- normal; Mouth- no ulcers, no lesions  NECK: no tenderness, no adenopathy, no asymmetry, no masses, no stiffness; thyroid- normal to palpation  RESP: lungs clear to auscultation - no rales, no rhonchi, no wheezes  CV: regular rates and rhythm, normal S1 S2, no S3 " or S4 and no murmur, no click or rub -  ABDOMEN: soft, no tenderness, no  hepatosplenomegaly, no masses, normal bowel sounds  MS: both shins with trace pitting edema and mild pinkish pigmentation.  SKIN: no suspicious lesions, no rashes    Results for orders placed or performed in visit on 07/07/17   CBC with platelets differential   Result Value Ref Range    WBC 8.7 4.0 - 11.0 10e9/L    RBC Count 2.79 (L) 3.8 - 5.2 10e12/L    Hemoglobin 9.5 (L) 11.7 - 15.7 g/dL    Hematocrit 28.7 (L) 35.0 - 47.0 %     (H) 78 - 100 fl    MCH 34.1 (H) 26.5 - 33.0 pg    MCHC 33.1 31.5 - 36.5 g/dL    RDW 15.5 (H) 10.0 - 15.0 %    Platelet Count 279 150 - 450 10e9/L    Diff Method Automated Method     % Neutrophils 55.7 %    % Lymphocytes 15.4 %    % Monocytes 11.3 %    % Eosinophils 17.0 %    % Basophils 0.6 %    Absolute Neutrophil 4.8 1.6 - 8.3 10e9/L    Absolute Lymphocytes 1.3 0.8 - 5.3 10e9/L    Absolute Monocytes 1.0 0.0 - 1.3 10e9/L    Absolute Eosinophils 1.5 (H) 0.0 - 0.7 10e9/L    Absolute Basophils 0.1 0.0 - 0.2 10e9/L   Comprehensive metabolic panel   Result Value Ref Range    Sodium 136 133 - 144 mmol/L    Potassium 4.4 3.4 - 5.3 mmol/L    Chloride 99 94 - 109 mmol/L    Carbon Dioxide 31 20 - 32 mmol/L    Anion Gap 6 3 - 14 mmol/L    Glucose 72 70 - 99 mg/dL    Urea Nitrogen 16 7 - 30 mg/dL    Creatinine 1.34 (H) 0.52 - 1.04 mg/dL    GFR Estimate 39 (L) >60 mL/min/1.7m2    GFR Estimate If Black 47 (L) >60 mL/min/1.7m2    Calcium 8.6 8.5 - 10.1 mg/dL    Bilirubin Total 1.7 (H) 0.2 - 1.3 mg/dL    Albumin 2.2 (L) 3.4 - 5.0 g/dL    Protein Total 6.3 (L) 6.8 - 8.8 g/dL    Alkaline Phosphatase 133 40 - 150 U/L    ALT 25 0 - 50 U/L    AST 33 0 - 45 U/L          ASSESSMENT/PLAN:                                                      1. Hospital discharge follow-up  Medically stable.     2. Bilateral lower leg cellulitis  Clinically improved. Use compression stockings. If any worsening of symptoms noted, recommended to follow  up or go to ER over the weekend.   - CBC with platelets differential  - Comprehensive metabolic panel    3. Thrombocytopenia (H)  Resolved.   - CBC with platelets differential  - Comprehensive metabolic panel    4. Macrocytic anemia  Likely due to alcohol abuse. Vit B12 and folate levels reviewed.  F/u with hematology as planned.     5. Acute renal failure, unspecified acute renal failure type (H)  Recommended to increase hydration. Hold off on using lasix.     6. Alcohol abuse  Counseled to avoid alcohol use. She plans to seek inpatient rehab. She has contacted them already.     7. Nonspecific abnormal results of liver function study  Improved with bilirubin level slowly trending down. Follow up with GI      Follow up with Provider - 3-4 days.    Total time spent was 45 minutes, more than half the time was spent in counseling the patient about the disease pathogenesis, treatment plan and coordinating care.    Alison Pena MD  [facility]

## 2017-07-07 NOTE — MR AVS SNAPSHOT
After Visit Summary   7/7/2017    Kavitha Headley    MRN: [id number]           Patient Information     Date Of Birth          1945        Visit Information        Provider Department      7/7/2017 1:20 PM Alison Pena MD [facility] [place]        Today's Diagnoses     Bilateral lower leg cellulitis    -  1    Thrombocytopenia (H)           Follow-ups after your visit        Follow-up notes from your care team     Return in about 3 days (around 7/10/2017) for follow up.      Your next 10 appointments already scheduled     Sep 20, 2017  1:30 PM CDT   Return Visit with  Oncology Nurse   [facility] ([facility])    [facility]  [address]  [facility] [id number] [phone]            Sep 20, 2017  2:00 PM CDT   Return Visit with Nahum Pa MD   [facility] ([facility])    [facility]  [address]  [facility] [address]   [phone]              Who to contact     If you have questions or need follow up information about today's clinic visit or your schedule please contact [facility] [place] directly at [phone].  Normal or non-critical lab and imaging results will be communicated to you by [employer], letter or phone within 4 business days after the clinic has received the results. If you do not hear from us within 7 days, please contact the clinic through [employer] or phone. If you have a critical or abnormal lab result, we will notify you by phone as soon as possible.  Submit refill requests through [employer] or call your pharmacy and they will forward the refill request to us. Please allow 3 business days for your refill to be completed.          Additional Information About Your Visit        [employer] Information     [employer] gives you secure access to your electronic health record. If you see a primary care provider, you can also send messages to  "your care team and make appointments. If you have questions, please call your primary care clinic.  If you do not have a primary care provider, please call [phone] and they will assist you.        Care EveryWhere ID     This is your Care EveryWhere ID. This could be used by other organizations to access your [place] medical records  [id number]        Your Vitals Were     Temperature Respirations Height Pulse Oximetry BMI (Body Mass Index)       99.3  F (37.4  C) (Tympanic) 14 5' 4\" (1.626 m) 97% 31.93 kg/m2        Blood Pressure from Last 3 Encounters:   07/07/17 (!) 82/51   06/27/17 113/73   06/16/17 131/80    Weight from Last 3 Encounters:   07/07/17 186 lb (84.4 kg)   06/27/17 194 lb 3.2 oz (88.1 kg)   06/16/17 198 lb 6.6 oz (90 kg)              We Performed the Following     CBC with platelets differential     Comprehensive metabolic panel          Today's Medication Changes          These changes are accurate as of: 7/7/17  2:12 PM.  If you have any questions, ask your nurse or doctor.               Stop taking these medicines if you haven't already. Please contact your care team if you have questions.     lactulose 10 GM/15ML solution   Commonly known as:  CHRONULAC   Stopped by:  Alison Pena MD                    Primary Care Provider Office Phone # Fax #    Alison Pena [phone]       [facility] [place] [address]  [place] [address]        Equal Access to Services     MACIEJ BUSH AH: Hadii юлия ordonezo Soaustyn, waaxda luqadaha, qaybta kaalmada sky huerta. So [facility] [phone].    ATENCIÓN: Si habla español, tiene a gaytan disposición servicios gratuitos de asistencia lingüística. Llame al [phone].    We comply with applicable federal civil rights laws and [place] laws. We do not discriminate on the basis of race, color, national origin, age, disability sex, sexual orientation or gender identity.          "   Thank you!     Thank you for choosing [facility] [place]  for your care. Our goal is always to provide you with excellent care. Hearing back from our patients is one way we can continue to improve our services. Please take a few minutes to complete the written survey that you may receive in the mail after your visit with us. Thank you!             Your Updated Medication List - Protect others around you: Learn how to safely use, store and throw away your medicines at www.disposemymeds.org.          This list is accurate as of: 7/7/17  2:12 PM.  Always use your most recent med list.                   Brand Name Dispense Instructions for use Diagnosis    CENTRUM SILVER per tablet      Take 1 tablet by mouth daily        folic acid 1 MG tablet    FOLVITE    30 tablet    Take 1 tablet (1 mg) by mouth daily    Alcohol abuse       gabapentin 300 MG capsule    NEURONTIN    90 capsule    Take 1 capsule (300 mg) by mouth daily    Chronic pain syndrome       magnesium oxide 400 MG tablet    MAG-OX    7 tablet    Take 1 tablet (400 mg) by mouth daily    Low magnesium levels       metoclopramide 5 MG tablet    REGLAN    40 tablet    Take 1 tablet (5 mg) by mouth 4 times daily as needed    Nausea       metoprolol 25 MG 24 hr tablet    TOPROL XL    90 tablet    Take 0.5 tablets (12.5 mg) by mouth daily    PVC's (premature ventricular contractions)       mirtazapine 15 MG tablet    REMERON    30 tablet    Take 1 tablet (15 mg) by mouth At Bedtime    Depression, unspecified depression type       polyethylene glycol Packet    MIRALAX/GLYCOLAX    7 packet    Take 17 g by mouth daily as needed (constipation)    Constipation, unspecified constipation type       ranitidine 150 MG tablet    ZANTAC    60 tablet    Take 1 tablet (150 mg) by mouth 2 times daily    Gastroesophageal reflux disease, esophagitis presence not specified, Esophagitis       RESTORIL PO      Take by mouth At Bedtime    Thrombocytopenia (H), Liver  disease, chronic, due to alcohol (H)       senna-docusate 8.6-50 MG per tablet    SENOKOT-S;PERICOLACE    100 tablet    Take 1-2 tablets by mouth 2 times daily as needed for constipation    Constipation, unspecified constipation type       sucralfate 1 GM/10ML suspension    CARAFATE    1200 mL    Take 10 mLs (1 g) by mouth 4 times daily (before meals and nightly)    Esophagitis       thiamine 100 MG tablet      Take 1 tablet (100 mg) by mouth daily    Alcohol abuse       traMADol 50 MG tablet    ULTRAM    60 tablet    Take 0.5 tablets (25 mg) by mouth 2 times daily as needed for moderate pain TAKE 1 TABLET BY MOUTH TWICE DAILY AS NEEDED FOR MODERATE PAIN    Chronic pain syndrome, Controlled substance agreement signed       valACYclovir 1000 mg tablet    VALTREX    4 tablet    TAKE 2 TABS EVERY 12 HRS FOR 1 DAY ONLY FOR OUTBREAKS OF COLD SORES as needed    Herpes simplex virus infection       vortioxetine 5 MG tablet    TRINTELLIX/BRINTELLIX     Take 1 tablet (5 mg) by mouth daily    Depression, unspecified depression type

## 2017-07-10 ENCOUNTER — OFFICE VISIT (OUTPATIENT)
Dept: FAMILY MEDICINE | Facility: CLINIC | Age: 72
End: 2017-07-10
Payer: COMMERCIAL

## 2017-07-10 VITALS
BODY MASS INDEX: 31.72 KG/M2 | HEART RATE: 118 BPM | TEMPERATURE: 97.8 F | OXYGEN SATURATION: 97 % | WEIGHT: 185.8 LBS | HEIGHT: 64 IN | DIASTOLIC BLOOD PRESSURE: 73 MMHG | SYSTOLIC BLOOD PRESSURE: 106 MMHG

## 2017-07-10 DIAGNOSIS — R17 ELEVATED BILIRUBIN: ICD-10-CM

## 2017-07-10 DIAGNOSIS — F10.930 ALCOHOL WITHDRAWAL, UNCOMPLICATED (H): ICD-10-CM

## 2017-07-10 DIAGNOSIS — D53.9 MACROCYTIC ANEMIA: ICD-10-CM

## 2017-07-10 DIAGNOSIS — K70.9 LIVER DISEASE, CHRONIC, DUE TO ALCOHOL (H): ICD-10-CM

## 2017-07-10 DIAGNOSIS — D69.6 THROMBOCYTOPENIA (H): ICD-10-CM

## 2017-07-10 DIAGNOSIS — R94.4 ABNORMAL RENAL FUNCTION TEST: Primary | ICD-10-CM

## 2017-07-10 LAB
ERYTHROCYTE [DISTWIDTH] IN BLOOD BY AUTOMATED COUNT: 15.7 % (ref 10–15)
HCT VFR BLD AUTO: 28.2 % (ref 35–47)
HGB BLD-MCNC: 9.4 G/DL (ref 11.7–15.7)
MCH RBC QN AUTO: 34.7 PG (ref 26.5–33)
MCHC RBC AUTO-ENTMCNC: 33.3 G/DL (ref 31.5–36.5)
MCV RBC AUTO: 104 FL (ref 78–100)
PLATELET # BLD AUTO: 298 10E9/L (ref 150–450)
RBC # BLD AUTO: 2.71 10E12/L (ref 3.8–5.2)
WBC # BLD AUTO: 10 10E9/L (ref 4–11)

## 2017-07-10 PROCEDURE — 99214 OFFICE O/P EST MOD 30 MIN: CPT | Performed by: FAMILY MEDICINE

## 2017-07-10 PROCEDURE — 80048 BASIC METABOLIC PNL TOTAL CA: CPT | Performed by: FAMILY MEDICINE

## 2017-07-10 PROCEDURE — 80076 HEPATIC FUNCTION PANEL: CPT | Performed by: FAMILY MEDICINE

## 2017-07-10 PROCEDURE — 85027 COMPLETE CBC AUTOMATED: CPT | Performed by: FAMILY MEDICINE

## 2017-07-10 PROCEDURE — 36415 COLL VENOUS BLD VENIPUNCTURE: CPT | Performed by: FAMILY MEDICINE

## 2017-07-10 RX ORDER — CLONAZEPAM 0.5 MG/1
.25-.5 TABLET ORAL DAILY PRN
Qty: 10 TABLET | Refills: 0 | Status: SHIPPED | OUTPATIENT
Start: 2017-07-10 | End: 2017-08-14

## 2017-07-10 ASSESSMENT — PAIN SCALES - GENERAL: PAINLEVEL: MODERATE PAIN (4)

## 2017-07-10 NOTE — PROGRESS NOTES
SUBJECTIVE:                                                    Kavitha Headley is a 71 year old female who presents to clinic today for the following health issues:    Follow up - Bilateral lower leg cellulitis - Tender -soreness - sensitive to the touch - Currently, shaking - do not feel to good   No alcohol used in the last 4 days. No fever or chills.   Here to follow up on labs.   Feels restless during the day.       Problem list and histories reviewed & adjusted, as indicated.  Additional history: as documented    Patient Active Problem List   Diagnosis     Hyperlipidemia LDL goal <130     Spinal stenosis     Chronic insomnia     Alcohol abuse     CKD (chronic kidney disease) stage 3, GFR 30-59 ml/min     Hip joint replacement status     Knee joint replacement status     Chronic pain syndrome     Bariatric surgery status     Personal history of urinary calculi     Macrocytic anemia     Anxiety     Aortic stenosis     Left-sided low back pain without sciatica     ACP (advance care planning)     PAC (premature atrial contraction)     Gastroesophageal reflux disease, esophagitis presence not specified     Controlled substance agreement signed     Seasonal affective disorder (H)     HTN, goal below 140/90     Bilateral lower leg cellulitis     Hypokalemia     Hyponatremia     Cellulitis     Depression, unspecified depression type     Past Surgical History:   Procedure Laterality Date     APPENDECTOMY  3/2004    incidental     C GASTRIC BYPASS,OBESE<100CM ARIANNA-EN-Y  1996     C MEDIASTINOSCOPY W OR WO BIOPSY  2/2008    Videomediastinoscopy and, for mediastinal adenopathy -reactive lymphoid hyperplasia     C REPAIR OF RECTOCELE  3/2012     C TOTAL KNEE ARTHROPLASTY  12/2005    left      CARPAL TUNNEL RELEASE RT/LT  10/2010    Carpometacarpal excisional arthroplasty with a fascial autograft and APL suspension sling (89880). 2. Left thumb metacarpophalangeal joint fusion with autologous bone graft (04837). 3. Left  endoscopic carpal tunnel release      CHOLECYSTECTOMY, LAPOROSCOPIC  11/2010    Cholecystectomy, Laparoscopic     COLONOSCOPY N/A 9/8/2016    Procedure: COMBINED COLONOSCOPY, SINGLE OR MULTIPLE BIOPSY/POLYPECTOMY BY BIOPSY;  Surgeon: Moe Barlow MD;  Location:  GI     CYSTOCELE REPAIR  11/2012    davinc laparoscopic sacrocolpopexy, enterocele repair, lysis of adhesions, placement of retropubic mid urethral sling, cystoscopy     CYSTOSCOPY, LITHOTRIPSY, COMBINED  6/2006    Left extracorporeal shock wave lithotripsy, cystoscopy, left ureteral stent placement.     CYSTOSCOPY, REMOVE STENT(S), COMBINED  7/2006    Cystoscopy, removal of left ureteral stent, retrograde pyelography, flexible and rigid ureteroscopy and holmium laser lithotripsy, basket removal of stone fragments, ureteral stent placement.      ENDOSCOPIC ULTRASOUND UPPER GASTROINTESTINAL TRACT (GI) N/A 6/12/2017    Procedure: ENDOSCOPIC ULTRASOUND, ESOPHAGOSCOPY / UPPER GASTROINTESTINAL TRACT (GI);  ENDOSCOPIC ULTRASOUND, ESOPHAGOSCOPY / UPPER GASTROINTESTINAL TRACT (GI);  Surgeon: Parth Graham MD;  Location:  GI     HERNIA REPAIR  4/2012    bilateral augmentation mastopexy, ventral hernia repair, and medial thigh liposuction on 04/06/2012.      HYSTERECTOMY VAGINAL, BILATERAL SALPINGO-OOPHERECTOMY, COMBINED  1998    due to myoma and bleeding     JOINT REPLACEMENT, HIP RT/LT  4/2004    right total hip arthroplasty     LAPAROTOMY, LYSIS ADHESIONS, COMBINED  3/2004    lysis adhesions, ventral hernia repair, appendectomy incidentally     LYMPH NODE BIOPSY  4/2008    right axillary, reactive follicular and paracortical hyperplasia.     MAMMOPLASTY AUGMENTATION BILATERAL  4/2012     REVISE RECONSTRUCTED BREAST  6/7/2012    Left breast capsulotomy.        Social History   Substance Use Topics     Smoking status: Never Smoker     Smokeless tobacco: Never Used     Alcohol use 0.0 oz/week     0 Standard drinks or equivalent per week      Comment:  2-3 drinks per day     Family History   Problem Relation Age of Onset     Substance Abuse Father      CANCER Father      throat and lung mets     DIABETES No family hx of      Coronary Artery Disease No family hx of      CEREBROVASCULAR DISEASE No family hx of          Current Outpatient Prescriptions   Medication Sig Dispense Refill     clonazePAM (KLONOPIN) 0.5 MG tablet Take 0.5-1 tablets (0.25-0.5 mg) by mouth daily as needed (for alcohol withdrawl) 10 tablet 0     Temazepam (RESTORIL PO) Take by mouth At Bedtime       vortioxetine (TRINTELLIX/BRINTELLIX) 5 MG tablet Take 1 tablet (5 mg) by mouth daily       folic acid (FOLVITE) 1 MG tablet Take 1 tablet (1 mg) by mouth daily 30 tablet      thiamine 100 MG tablet Take 1 tablet (100 mg) by mouth daily       sucralfate (CARAFATE) 1 GM/10ML suspension Take 10 mLs (1 g) by mouth 4 times daily (before meals and nightly) 1200 mL      ranitidine (ZANTAC) 150 MG tablet Take 1 tablet (150 mg) by mouth 2 times daily 60 tablet      magnesium oxide (MAG-OX) 400 MG tablet Take 1 tablet (400 mg) by mouth daily 7 tablet      gabapentin (NEURONTIN) 300 MG capsule Take 1 capsule (300 mg) by mouth daily 90 capsule      polyethylene glycol (MIRALAX/GLYCOLAX) Packet Take 17 g by mouth daily as needed (constipation) 7 packet      senna-docusate (SENOKOT-S;PERICOLACE) 8.6-50 MG per tablet Take 1-2 tablets by mouth 2 times daily as needed for constipation 100 tablet      metoclopramide (REGLAN) 5 MG tablet Take 1 tablet (5 mg) by mouth 4 times daily as needed 40 tablet 0     valACYclovir (VALTREX) 1000 mg tablet TAKE 2 TABS EVERY 12 HRS FOR 1 DAY ONLY FOR OUTBREAKS OF COLD SORES as needed 4 tablet 3     Multiple Vitamins-Minerals (CENTRUM SILVER) per tablet Take 1 tablet by mouth daily       mirtazapine (REMERON) 7.5 MG TABS tablet Take 1 tablet (7.5 mg) by mouth At Bedtime 30 tablet 1     Allergies   Allergen Reactions     Bactrim [Sulfamethoxazole W/Trimethoprim] Hives     Codeine  "Itching     NAUSEA     Morphine Itching     NAUSEA       Reviewed and updated as needed this visit by clinical staff       Reviewed and updated as needed this visit by Provider         ROS:  C: NEGATIVE for fever, chills, change in weight  E/M: NEGATIVE for ear, mouth and throat problems  R: NEGATIVE for significant cough or SOB  CV: NEGATIVE for chest pain, palpitations or peripheral edema    OBJECTIVE:                                                    /73 (BP Location: Right arm, Patient Position: Chair, Cuff Size: Adult Regular)  Pulse 118  Temp 97.8  F (36.6  C) (Tympanic)  Ht 5' 4\" (1.626 m)  Wt 185 lb 12.8 oz (84.3 kg)  SpO2 97%  Breastfeeding? No  BMI 31.89 kg/m2  Body mass index is 31.89 kg/(m^2).   GENERAL: healthy, alert, well nourished, well hydrated, no distress  HENT: ear canals- normal; TMs- normal; Nose- normal; Mouth- no ulcers, no lesions  NECK: no tenderness, no adenopathy, no asymmetry, no masses, no stiffness; thyroid- normal to palpation  RESP: lungs clear to auscultation - no rales, no rhonchi, no wheezes  CV: regular rates and rhythm, normal S1 S2, no S3 or S4 and no murmur, no click or rub -  ABDOMEN: soft, no tenderness, no  hepatosplenomegaly, no masses, normal bowel sounds  Mild tremors noted in the hands.  Patient appears to be slightly anxious.  Skin: no erythema of the legs noted.  1+ edema in the legs b/l  Results for orders placed or performed in visit on 07/10/17   Basic metabolic panel   Result Value Ref Range    Sodium 140 133 - 144 mmol/L    Potassium 4.8 3.4 - 5.3 mmol/L    Chloride 104 94 - 109 mmol/L    Carbon Dioxide 23 20 - 32 mmol/L    Anion Gap 13 3 - 14 mmol/L    Glucose 95 70 - 99 mg/dL    Urea Nitrogen 14 7 - 30 mg/dL    Creatinine 1.15 (H) 0.52 - 1.04 mg/dL    GFR Estimate 46 (L) >60 mL/min/1.7m2    GFR Estimate If Black 56 (L) >60 mL/min/1.7m2    Calcium 8.5 8.5 - 10.1 mg/dL   CBC with platelets   Result Value Ref Range    WBC 10.0 4.0 - 11.0 10e9/L    " RBC Count 2.71 (L) 3.8 - 5.2 10e12/L    Hemoglobin 9.4 (L) 11.7 - 15.7 g/dL    Hematocrit 28.2 (L) 35.0 - 47.0 %     (H) 78 - 100 fl    MCH 34.7 (H) 26.5 - 33.0 pg    MCHC 33.3 31.5 - 36.5 g/dL    RDW 15.7 (H) 10.0 - 15.0 %    Platelet Count 298 150 - 450 10e9/L   Hepatic panel   Result Value Ref Range    Bilirubin Direct 0.9 (H) 0.0 - 0.2 mg/dL    Bilirubin Total 1.3 0.2 - 1.3 mg/dL    Albumin 2.4 (L) 3.4 - 5.0 g/dL    Protein Total 6.4 (L) 6.8 - 8.8 g/dL    Alkaline Phosphatase 136 40 - 150 U/L    ALT 25 0 - 50 U/L    AST 33 0 - 45 U/L          ASSESSMENT/PLAN:                                                      1. Abnormal renal function test  Improved as compared to before but still not WNL. Recommended to stay well hydrated.  - Basic metabolic panel  - CBC with platelets  - Hepatic panel    2. Macrocytic anemia  Not improving much. Resume the oral supplements.   - CBC with platelets    3. Elevated bilirubin  Slowly trending down  - Hepatic panel    4. Thrombocytopenia (H)  resolved    5. Liver disease, chronic, due to alcohol (H)  AST and ALT are normal now. Bilirubin level slowly trending down    6. Alcohol withdrawal, uncomplicated (H)  Patient has not consumed any alcohol for the last 4 days now.I am ordering klonopin 10 tabs only to use sparingly during the day to help her prevent the withdrawals.  No further refills will be given . She does use Temazepam at night to help her sleep.   Recommended to stop using Ultram. We will slowly be working on weaning her off of her medications.  Meanwhile recommended to contact the rehab facility .  - clonazePAM (KLONOPIN) 0.5 MG tablet; Take 0.5-1 tablets (0.25-0.5 mg) by mouth daily as needed (for alcohol withdrawl)  Dispense: 10 tablet; Refill: 0      Follow up with Provider - in 3-4 days.     Alison Pena MD  [facility] [place] [facility]

## 2017-07-10 NOTE — MR AVS SNAPSHOT
After Visit Summary   7/10/2017    Kavitha Headley    MRN: [id number]           Patient Information     Date Of Birth          1945        Visit Information        Provider Department      7/10/2017 1:20 PM Alison Pena MD [facility] [place]        Today's Diagnoses     Abnormal renal function test    -  1    Macrocytic anemia        Elevated bilirubin        Thrombocytopenia (H)        Liver disease, chronic, due to alcohol (H)        Alcohol withdrawal, uncomplicated (H)           Follow-ups after your visit        Follow-up notes from your care team     Return in about 4 days (around 7/14/2017) for BP Recheck.      Your next 10 appointments already scheduled     Sep 20, 2017  1:30 PM CDT   Return Visit with  Oncology Nurse   [facility]    [facility] [place]  [address]  [facility] [address]   [phone]            Sep 20, 2017  2:00 PM CDT   Return Visit with Nahum Pa MD   [facility]    [facility] [place]  [address]  [facility] [address]   [phone]              Who to contact     If you have questions or need follow up information about today's clinic visit or your schedule please contact [facility] [place] directly at [phone].  Normal or non-critical lab and imaging results will be communicated to you by MyChart, letter or phone within 4 business days after the clinic has received the results. If you do not hear from us within 7 days, please contact the clinic through MyChart or phone. If you have a critical or abnormal lab result, we will notify you by phone as soon as possible.  Submit refill requests through [employer] or call your pharmacy and they will forward the refill request to us. Please allow 3 business days for your refill to be completed.          Additional Information About Your Visit        [employer]  "Information     [employer] gives you secure access to your electronic health record. If you see a primary care provider, you can also send messages to your care team and make appointments. If you have questions, please call your primary care clinic.  If you do not have a primary care provider, please call [phone] and they will assist you.        Care EveryWhere ID     This is your Care EveryWhere ID. This could be used by other organizations to access your [place] medical records  [id number]        Your Vitals Were     Pulse Temperature Height Pulse Oximetry Breastfeeding? BMI (Body Mass Index)    118 97.8  F (36.6  C) (Tympanic) 5' 4\" (1.626 m) 97% No 31.89 kg/m2       Blood Pressure from Last 3 Encounters:   07/10/17 106/73   07/07/17 (!) 82/51   06/27/17 113/73    Weight from Last 3 Encounters:   07/10/17 185 lb 12.8 oz (84.3 kg)   07/07/17 186 lb (84.4 kg)   06/27/17 194 lb 3.2 oz (88.1 kg)              We Performed the Following     Basic metabolic panel     CBC with platelets     Hepatic panel          Today's Medication Changes          These changes are accurate as of: 7/10/17  1:41 PM.  If you have any questions, ask your nurse or doctor.               Start taking these medicines.        Dose/Directions    clonazePAM 0.5 MG tablet   Commonly known as:  klonoPIN   Used for:  Alcohol withdrawal, uncomplicated (H)   Started by:  Alison Pena MD        Dose:  0.25-0.5 mg   Take 0.5-1 tablets (0.25-0.5 mg) by mouth daily as needed (for alcohol withdrawl)   Quantity:  10 tablet   Refills:  0         Stop taking these medicines if you haven't already. Please contact your care team if you have questions.     metoprolol 25 MG 24 hr tablet   Commonly known as:  TOPROL XL   Stopped by:  Alison Pena MD                Where to get your medicines      Some of these will need a paper prescription and others can be bought over the counter.  Ask your nurse if you have questions.     Bring a paper prescription for " each of these medications     clonazePAM 0.5 MG tablet                Primary Care Provider Office Phone # Fax #    Alison Pena [phone]       [facility] [place] [address]  [place] [address]        Equal Access to Services     PATRICIA BUSH : Hadii aad ku hadasho Soomaali, waaxda luqadaha, qaybta kaalmada adeegyada, waxay bhaktiin hayaan alan travisrafaelkelly king. So [facility] [phone].    ATENCIÓN: Si habla español, tiene a gaytan disposición servicios gratuitos de asistencia lingüística. Shirleyame al [phone].    We comply with applicable federal civil rights laws and [place] laws. We do not discriminate on the basis of race, color, national origin, age, disability sex, sexual orientation or gender identity.            Thank you!     Thank you for choosing [facility] [place]  for your care. Our goal is always to provide you with excellent care. Hearing back from our patients is one way we can continue to improve our services. Please take a few minutes to complete the written survey that you may receive in the mail after your visit with us. Thank you!             Your Updated Medication List - Protect others around you: Learn how to safely use, store and throw away your medicines at www.disposemymeds.org.          This list is accurate as of: 7/10/17  1:41 PM.  Always use your most recent med list.                   Brand Name Dispense Instructions for use Diagnosis    CENTRUM SILVER per tablet      Take 1 tablet by mouth daily        clonazePAM 0.5 MG tablet    klonoPIN    10 tablet    Take 0.5-1 tablets (0.25-0.5 mg) by mouth daily as needed (for alcohol withdrawl)    Alcohol withdrawal, uncomplicated (H)       folic acid 1 MG tablet    FOLVITE    30 tablet    Take 1 tablet (1 mg) by mouth daily    Alcohol abuse       gabapentin 300 MG capsule    NEURONTIN    90 capsule    Take 1 capsule (300 mg) by mouth daily    Chronic pain syndrome       magnesium oxide 400 MG  tablet    MAG-OX    7 tablet    Take 1 tablet (400 mg) by mouth daily    Low magnesium levels       metoclopramide 5 MG tablet    REGLAN    40 tablet    Take 1 tablet (5 mg) by mouth 4 times daily as needed    Nausea       mirtazapine 15 MG tablet    REMERON    30 tablet    Take 1 tablet (15 mg) by mouth At Bedtime    Depression, unspecified depression type       polyethylene glycol Packet    MIRALAX/GLYCOLAX    7 packet    Take 17 g by mouth daily as needed (constipation)    Constipation, unspecified constipation type       ranitidine 150 MG tablet    ZANTAC    60 tablet    Take 1 tablet (150 mg) by mouth 2 times daily    Gastroesophageal reflux disease, esophagitis presence not specified, Esophagitis       RESTORIL PO      Take by mouth At Bedtime    Thrombocytopenia (H), Liver disease, chronic, due to alcohol (H)       senna-docusate 8.6-50 MG per tablet    SENOKOT-S;PERICOLACE    100 tablet    Take 1-2 tablets by mouth 2 times daily as needed for constipation    Constipation, unspecified constipation type       sucralfate 1 GM/10ML suspension    CARAFATE    1200 mL    Take 10 mLs (1 g) by mouth 4 times daily (before meals and nightly)    Esophagitis       thiamine 100 MG tablet      Take 1 tablet (100 mg) by mouth daily    Alcohol abuse       traMADol 50 MG tablet    ULTRAM    60 tablet    Take 0.5 tablets (25 mg) by mouth 2 times daily as needed for moderate pain TAKE 1 TABLET BY MOUTH TWICE DAILY AS NEEDED FOR MODERATE PAIN    Chronic pain syndrome, Controlled substance agreement signed       valACYclovir 1000 mg tablet    VALTREX    4 tablet    TAKE 2 TABS EVERY 12 HRS FOR 1 DAY ONLY FOR OUTBREAKS OF COLD SORES as needed    Herpes simplex virus infection       vortioxetine 5 MG tablet    TRINTELLIX/BRINTELLIX     Take 1 tablet (5 mg) by mouth daily    Depression, unspecified depression type

## 2017-07-10 NOTE — NURSING NOTE
"Chief Complaint   Patient presents with     RECHECK       Initial /73 (BP Location: Right arm, Patient Position: Chair, Cuff Size: Adult Regular)  Pulse 118  Temp 97.8  F (36.6  C) (Tympanic)  Ht 5' 4\" (1.626 m)  Wt 185 lb 12.8 oz (84.3 kg)  SpO2 97%  Breastfeeding? No  BMI 31.89 kg/m2 Estimated body mass index is 31.89 kg/(m^2) as calculated from the following:    Height as of this encounter: 5' 4\" (1.626 m).    Weight as of this encounter: 185 lb 12.8 oz (84.3 kg).  Medication Reconciliation: complete     Ophelia Howard MA     "

## 2017-07-11 LAB
ALBUMIN SERPL-MCNC: 2.4 G/DL (ref 3.4–5)
ALP SERPL-CCNC: 136 U/L (ref 40–150)
ALT SERPL W P-5'-P-CCNC: 25 U/L (ref 0–50)
ANION GAP SERPL CALCULATED.3IONS-SCNC: 13 MMOL/L (ref 3–14)
AST SERPL W P-5'-P-CCNC: 33 U/L (ref 0–45)
BILIRUB DIRECT SERPL-MCNC: 0.9 MG/DL (ref 0–0.2)
BILIRUB SERPL-MCNC: 1.3 MG/DL (ref 0.2–1.3)
BUN SERPL-MCNC: 14 MG/DL (ref 7–30)
CALCIUM SERPL-MCNC: 8.5 MG/DL (ref 8.5–10.1)
CHLORIDE SERPL-SCNC: 104 MMOL/L (ref 94–109)
CO2 SERPL-SCNC: 23 MMOL/L (ref 20–32)
CREAT SERPL-MCNC: 1.15 MG/DL (ref 0.52–1.04)
GFR SERPL CREATININE-BSD FRML MDRD: 46 ML/MIN/1.7M2
GLUCOSE SERPL-MCNC: 95 MG/DL (ref 70–99)
POTASSIUM SERPL-SCNC: 4.8 MMOL/L (ref 3.4–5.3)
PROT SERPL-MCNC: 6.4 G/DL (ref 6.8–8.8)
SODIUM SERPL-SCNC: 140 MMOL/L (ref 133–144)

## 2017-07-14 ENCOUNTER — OFFICE VISIT (OUTPATIENT)
Dept: FAMILY MEDICINE | Facility: CLINIC | Age: 72
End: 2017-07-14
Payer: COMMERCIAL

## 2017-07-14 VITALS
HEART RATE: 101 BPM | HEIGHT: 64 IN | OXYGEN SATURATION: 97 % | SYSTOLIC BLOOD PRESSURE: 104 MMHG | DIASTOLIC BLOOD PRESSURE: 70 MMHG | WEIGHT: 178.8 LBS | TEMPERATURE: 98.9 F | BODY MASS INDEX: 30.52 KG/M2

## 2017-07-14 DIAGNOSIS — F32.A DEPRESSION, UNSPECIFIED DEPRESSION TYPE: Primary | ICD-10-CM

## 2017-07-14 DIAGNOSIS — F10.10 ALCOHOL ABUSE: ICD-10-CM

## 2017-07-14 DIAGNOSIS — Z12.31 ENCOUNTER FOR SCREENING MAMMOGRAM FOR BREAST CANCER: ICD-10-CM

## 2017-07-14 DIAGNOSIS — N18.30 CKD (CHRONIC KIDNEY DISEASE) STAGE 3, GFR 30-59 ML/MIN (H): ICD-10-CM

## 2017-07-14 PROCEDURE — 99214 OFFICE O/P EST MOD 30 MIN: CPT | Performed by: FAMILY MEDICINE

## 2017-07-14 RX ORDER — MIRTAZAPINE 7.5 MG/1
7.5 TABLET, FILM COATED ORAL AT BEDTIME
Qty: 30 TABLET | Refills: 1 | Status: SHIPPED | OUTPATIENT
Start: 2017-07-14 | End: 2017-08-14

## 2017-07-14 ASSESSMENT — ANXIETY QUESTIONNAIRES
1. FEELING NERVOUS, ANXIOUS, OR ON EDGE: SEVERAL DAYS
3. WORRYING TOO MUCH ABOUT DIFFERENT THINGS: SEVERAL DAYS
GAD7 TOTAL SCORE: 4
6. BECOMING EASILY ANNOYED OR IRRITABLE: SEVERAL DAYS
7. FEELING AFRAID AS IF SOMETHING AWFUL MIGHT HAPPEN: SEVERAL DAYS
2. NOT BEING ABLE TO STOP OR CONTROL WORRYING: NOT AT ALL
5. BEING SO RESTLESS THAT IT IS HARD TO SIT STILL: NOT AT ALL

## 2017-07-14 ASSESSMENT — PATIENT HEALTH QUESTIONNAIRE - PHQ9: 5. POOR APPETITE OR OVEREATING: NOT AT ALL

## 2017-07-14 NOTE — PROGRESS NOTES
SUBJECTIVE:                                                    Kavitha Headley is a 71 year old female who presents to clinic today for the following health issues:      Medication Followup of Clonazepam     Taking Medication as prescribed: yes    Side Effects:  None    Medication Helping Symptoms:  yes       Problem list and histories reviewed & adjusted, as indicated.  Additional history: as documented    Patient Active Problem List   Diagnosis     Hyperlipidemia LDL goal <130     Spinal stenosis     Chronic insomnia     Alcohol abuse     CKD (chronic kidney disease) stage 3, GFR 30-59 ml/min     Hip joint replacement status     Knee joint replacement status     Chronic pain syndrome     Bariatric surgery status     Personal history of urinary calculi     Macrocytic anemia     Anxiety     Aortic stenosis     Left-sided low back pain without sciatica     ACP (advance care planning)     PAC (premature atrial contraction)     Gastroesophageal reflux disease, esophagitis presence not specified     Controlled substance agreement signed     Seasonal affective disorder (H)     HTN, goal below 140/90     Bilateral lower leg cellulitis     Hypokalemia     Hyponatremia     Cellulitis     Depression, unspecified depression type     Past Surgical History:   Procedure Laterality Date     APPENDECTOMY  3/2004    incidental     C GASTRIC BYPASS,OBESE<100CM ARIANNA-EN-Y  1996     C MEDIASTINOSCOPY W OR WO BIOPSY  2/2008    Videomediastinoscopy and, for mediastinal adenopathy -reactive lymphoid hyperplasia     C REPAIR OF RECTOCELE  3/2012     C TOTAL KNEE ARTHROPLASTY  12/2005    left      CARPAL TUNNEL RELEASE RT/LT  10/2010    Carpometacarpal excisional arthroplasty with a fascial autograft and APL suspension sling (18171). 2. Left thumb metacarpophalangeal joint fusion with autologous bone graft (35974). 3. Left endoscopic carpal tunnel release      CHOLECYSTECTOMY, LAPOROSCOPIC  11/2010    Cholecystectomy, Laparoscopic      COLONOSCOPY N/A 9/8/2016    Procedure: COMBINED COLONOSCOPY, SINGLE OR MULTIPLE BIOPSY/POLYPECTOMY BY BIOPSY;  Surgeon: Moe Barlow MD;  Location:  GI     CYSTOCELE REPAIR  11/2012    davinci laparoscopic sacrocolpopexy, enterocele repair, lysis of adhesions, placement of retropubic mid urethral sling, cystoscopy     CYSTOSCOPY, LITHOTRIPSY, COMBINED  6/2006    Left extracorporeal shock wave lithotripsy, cystoscopy, left ureteral stent placement.     CYSTOSCOPY, REMOVE STENT(S), COMBINED  7/2006    Cystoscopy, removal of left ureteral stent, retrograde pyelography, flexible and rigid ureteroscopy and holmium laser lithotripsy, basket removal of stone fragments, ureteral stent placement.      ENDOSCOPIC ULTRASOUND UPPER GASTROINTESTINAL TRACT (GI) N/A 6/12/2017    Procedure: ENDOSCOPIC ULTRASOUND, ESOPHAGOSCOPY / UPPER GASTROINTESTINAL TRACT (GI);  ENDOSCOPIC ULTRASOUND, ESOPHAGOSCOPY / UPPER GASTROINTESTINAL TRACT (GI);  Surgeon: Parth Graham MD;  Location:  GI     HERNIA REPAIR  4/2012    bilateral augmentation mastopexy, ventral hernia repair, and medial thigh liposuction on 04/06/2012.      HYSTERECTOMY VAGINAL, BILATERAL SALPINGO-OOPHERECTOMY, COMBINED  1998    due to myoma and bleeding     JOINT REPLACEMENT, HIP RT/LT  4/2004    right total hip arthroplasty     LAPAROTOMY, LYSIS ADHESIONS, COMBINED  3/2004    lysis adhesions, ventral hernia repair, appendectomy incidentally     LYMPH NODE BIOPSY  4/2008    right axillary, reactive follicular and paracortical hyperplasia.     MAMMOPLASTY AUGMENTATION BILATERAL  4/2012     REVISE RECONSTRUCTED BREAST  6/7/2012    Left breast capsulotomy.        Social History   Substance Use Topics     Smoking status: Never Smoker     Smokeless tobacco: Never Used     Alcohol use 0.0 oz/week     0 Standard drinks or equivalent per week      Comment: 2-3 drinks per day     Family History   Problem Relation Age of Onset     Substance Abuse Father      CANCER  Father      throat and lung mets     DIABETES No family hx of      Coronary Artery Disease No family hx of      CEREBROVASCULAR DISEASE No family hx of          Current Outpatient Prescriptions   Medication Sig Dispense Refill     mirtazapine (REMERON) 7.5 MG TABS tablet Take 1 tablet (7.5 mg) by mouth At Bedtime 30 tablet 1     clonazePAM (KLONOPIN) 0.5 MG tablet Take 0.5-1 tablets (0.25-0.5 mg) by mouth daily as needed (for alcohol withdrawl) 10 tablet 0     Temazepam (RESTORIL PO) Take by mouth At Bedtime       vortioxetine (TRINTELLIX/BRINTELLIX) 5 MG tablet Take 1 tablet (5 mg) by mouth daily       folic acid (FOLVITE) 1 MG tablet Take 1 tablet (1 mg) by mouth daily 30 tablet      thiamine 100 MG tablet Take 1 tablet (100 mg) by mouth daily       sucralfate (CARAFATE) 1 GM/10ML suspension Take 10 mLs (1 g) by mouth 4 times daily (before meals and nightly) 1200 mL      ranitidine (ZANTAC) 150 MG tablet Take 1 tablet (150 mg) by mouth 2 times daily 60 tablet      magnesium oxide (MAG-OX) 400 MG tablet Take 1 tablet (400 mg) by mouth daily 7 tablet      gabapentin (NEURONTIN) 300 MG capsule Take 1 capsule (300 mg) by mouth daily 90 capsule      polyethylene glycol (MIRALAX/GLYCOLAX) Packet Take 17 g by mouth daily as needed (constipation) 7 packet      senna-docusate (SENOKOT-S;PERICOLACE) 8.6-50 MG per tablet Take 1-2 tablets by mouth 2 times daily as needed for constipation 100 tablet      metoclopramide (REGLAN) 5 MG tablet Take 1 tablet (5 mg) by mouth 4 times daily as needed 40 tablet 0     valACYclovir (VALTREX) 1000 mg tablet TAKE 2 TABS EVERY 12 HRS FOR 1 DAY ONLY FOR OUTBREAKS OF COLD SORES as needed 4 tablet 3     Multiple Vitamins-Minerals (CENTRUM SILVER) per tablet Take 1 tablet by mouth daily       Allergies   Allergen Reactions     Bactrim [Sulfamethoxazole W/Trimethoprim] Hives     Codeine Itching     NAUSEA     Morphine Itching     NAUSEA       Reviewed and updated as needed this visit by clinical  "staff       Reviewed and updated as needed this visit by Provider         ROS:  C: NEGATIVE for fever, chills, change in weight  E/M: NEGATIVE for ear, mouth and throat problems  R: NEGATIVE for significant cough or SOB  CV: NEGATIVE for chest pain, palpitations or peripheral edema    OBJECTIVE:                                                    /70  Pulse 101  Temp 98.9  F (37.2  C) (Tympanic)  Ht 5' 4\" (1.626 m)  Wt 178 lb 12.8 oz (81.1 kg)  SpO2 97%  BMI 30.69 kg/m2  Body mass index is 30.69 kg/(m^2).   GENERAL: healthy, alert, well nourished, well hydrated, no distress  HENT: ear canals- normal; TMs- normal; Nose- normal; Mouth- no ulcers, no lesions  NECK: no tenderness, no adenopathy, no asymmetry, no masses, no stiffness; thyroid- normal to palpation  RESP: lungs clear to auscultation - no rales, no rhonchi, no wheezes  CV: regular rates and rhythm, normal S1 S2, no S3 or S4 and no murmur, no click or rub -  ABDOMEN: soft, no tenderness, no  hepatosplenomegaly, no masses, normal bowel sounds  MS: extremities- no gross deformities noted, no edema             ASSESSMENT/PLAN:                                                      1. Depression, unspecified depression type  Well controlled.   Lowering the dose of Remeron from 15 to 7.5 mg QHS.  - mirtazapine (REMERON) 7.5 MG TABS tablet; Take 1 tablet (7.5 mg) by mouth At Bedtime  Dispense: 30 tablet; Refill: 1    2. CKD (chronic kidney disease) stage 3, GFR 30-59 ml/min  Improving gradually. Recheck labs in 1 week  - Comprehensive metabolic panel; Future  - CBC with platelets; Future  - Albumin Random Urine Quantitative; Future    3. Alcohol abuse  Patient is sober since the recent hospital stay.  No further refill will be given on Klonopin. Patient has 4 tabs left. Recommended to break them in half when using.  - Comprehensive metabolic panel; Future  - CBC with platelets; Future    4. Encounter for screening mammogram for breast cancer    - *MA " Screening Digital Bilateral; Future      Follow up with Provider - 1 week     Alison Pena MD  [facility]

## 2017-07-14 NOTE — NURSING NOTE
"Chief Complaint   Patient presents with     Recheck Medication       Initial /70  Pulse 101  Temp 98.9  F (37.2  C) (Tympanic)  Ht 5' 4\" (1.626 m)  Wt 178 lb 12.8 oz (81.1 kg)  SpO2 97%  BMI 30.69 kg/m2 Estimated body mass index is 30.69 kg/(m^2) as calculated from the following:    Height as of this encounter: 5' 4\" (1.626 m).    Weight as of this encounter: 178 lb 12.8 oz (81.1 kg).  Medication Reconciliation: complete  "

## 2017-07-14 NOTE — MR AVS SNAPSHOT
After Visit Summary   7/14/2017    Kavitha Headley    MRN: [id number]           Patient Information     Date Of Birth          1945        Visit Information        Provider Department      7/14/2017 2:00 PM Alison Pena MD [facility] [place]        Today's Diagnoses     CKD (chronic kidney disease) stage 3, GFR 30-59 ml/min    -  1    Depression, unspecified depression type        Alcohol abuse           Follow-ups after your visit        Your next 10 appointments already scheduled     Sep 20, 2017  1:30 PM CDT   Return Visit with  Oncology Nurse   [facility] ([facility])    [facility]  [address]  [place] [address] [phone]            Sep 20, 2017  2:00 PM CDT   Return Visit with Nahum Pa MD   [facility] ([facility])    [facility]  [address]  [place] [address] [phone]              Future tests that were ordered for you today     Open Future Orders        Priority Expected Expires Ordered    Albumin Random Urine Quantitative Routine  7/14/2018 7/14/2017    Comprehensive metabolic panel Routine  9/14/2017 7/14/2017    CBC with platelets Routine  9/14/2017 7/14/2017            Who to contact     If you have questions or need follow up information about today's clinic visit or your schedule please contact [facility] [place] directly at [phone].  Normal or non-critical lab and imaging results will be communicated to you by MyChart, letter or phone within 4 business days after the clinic has received the results. If you do not hear from us within 7 days, please contact the clinic through MyChart or phone. If you have a critical or abnormal lab result, we will notify you by phone as soon as possible.  Submit refill requests through [employer] or call your pharmacy and they will forward the refill request to us. Please allow  "3 business days for your refill to be completed.          Additional Information About Your Visit        MyChart Information     [employer] gives you secure access to your electronic health record. If you see a primary care provider, you can also send messages to your care team and make appointments. If you have questions, please call your primary care clinic.  If you do not have a primary care provider, please call [phone] and they will assist you.        Care EveryWhere ID     This is your Care EveryWhere ID. This could be used by other organizations to access your [place] medical records  [id number]        Your Vitals Were     Pulse Temperature Height Pulse Oximetry BMI (Body Mass Index)       101 98.9  F (37.2  C) (Tympanic) 5' 4\" (1.626 m) 97% 30.69 kg/m2        Blood Pressure from Last 3 Encounters:   07/14/17 104/70   07/10/17 106/73   07/07/17 (!) 82/51    Weight from Last 3 Encounters:   07/14/17 178 lb 12.8 oz (81.1 kg)   07/10/17 185 lb 12.8 oz (84.3 kg)   07/07/17 186 lb (84.4 kg)                 Today's Medication Changes          These changes are accurate as of: 7/14/17  2:28 PM.  If you have any questions, ask your nurse or doctor.               These medicines have changed or have updated prescriptions.        Dose/Directions    mirtazapine 7.5 MG Tabs tablet   Commonly known as:  REMERON   This may have changed:    - medication strength  - how much to take   Used for:  Depression, unspecified depression type   Changed by:  Alison Pena MD        Dose:  7.5 mg   Take 1 tablet (7.5 mg) by mouth At Bedtime   Quantity:  30 tablet   Refills:  1         Stop taking these medicines if you haven't already. Please contact your care team if you have questions.     traMADol 50 MG tablet   Commonly known as:  ULTRAM   Stopped by:  Alsion Pena MD                Where to get your medicines      These medications were sent to [facility]/pharmacy #[id number] - [place] - [address]  " [address], [place] [address]     Phone:  [phone]     mirtazapine 7.5 MG Tabs tablet                Primary Care Provider Office Phone # Fax #    Alison Pena [phone]       [facility] [address]  [place] [address]        Equal Access to Services     [facility] RACHELLE : Hadii aad ku hadasho Soomaali, waaxda luqadaha, qaybta kaalmada adeegyada, waxay idiin hayaan adeeg kharash la'aan ah. So [facility] [phone].    ATENCIÓN: Si habla espizabel, tiene a gaytan disposición servicios gratuitos de asistencia lingüística. Llame al [phone].    We comply with applicable federal civil rights laws and [place] laws. We do not discriminate on the basis of race, color, national origin, age, disability sex, sexual orientation or gender identity.            Thank you!     Thank you for choosing [facility]  for your care. Our goal is always to provide you with excellent care. Hearing back from our patients is one way we can continue to improve our services. Please take a few minutes to complete the written survey that you may receive in the mail after your visit with us. Thank you!             Your Updated Medication List - Protect others around you: Learn how to safely use, store and throw away your medicines at www.disposemymeds.org.          This list is accurate as of: 7/14/17  2:28 PM.  Always use your most recent med list.                   Brand Name Dispense Instructions for use Diagnosis    CENTRUM SILVER per tablet      Take 1 tablet by mouth daily        clonazePAM 0.5 MG tablet    klonoPIN    10 tablet    Take 0.5-1 tablets (0.25-0.5 mg) by mouth daily as needed (for alcohol withdrawl)    Alcohol withdrawal, uncomplicated (H)       folic acid 1 MG tablet    FOLVITE    30 tablet    Take 1 tablet (1 mg) by mouth daily    Alcohol abuse       gabapentin 300 MG capsule    NEURONTIN    90 capsule    Take 1 capsule (300 mg) by mouth daily    Chronic  pain syndrome       magnesium oxide 400 MG tablet    MAG-OX    7 tablet    Take 1 tablet (400 mg) by mouth daily    Low magnesium levels       metoclopramide 5 MG tablet    REGLAN    40 tablet    Take 1 tablet (5 mg) by mouth 4 times daily as needed    Nausea       mirtazapine 7.5 MG Tabs tablet    REMERON    30 tablet    Take 1 tablet (7.5 mg) by mouth At Bedtime    Depression, unspecified depression type       polyethylene glycol Packet    MIRALAX/GLYCOLAX    7 packet    Take 17 g by mouth daily as needed (constipation)    Constipation, unspecified constipation type       ranitidine 150 MG tablet    ZANTAC    60 tablet    Take 1 tablet (150 mg) by mouth 2 times daily    Gastroesophageal reflux disease, esophagitis presence not specified, Esophagitis       RESTORIL PO      Take by mouth At Bedtime    Thrombocytopenia (H), Liver disease, chronic, due to alcohol (H)       senna-docusate 8.6-50 MG per tablet    SENOKOT-S;PERICOLACE    100 tablet    Take 1-2 tablets by mouth 2 times daily as needed for constipation    Constipation, unspecified constipation type       sucralfate 1 GM/10ML suspension    CARAFATE    1200 mL    Take 10 mLs (1 g) by mouth 4 times daily (before meals and nightly)    Esophagitis       thiamine 100 MG tablet      Take 1 tablet (100 mg) by mouth daily    Alcohol abuse       valACYclovir 1000 mg tablet    VALTREX    4 tablet    TAKE 2 TABS EVERY 12 HRS FOR 1 DAY ONLY FOR OUTBREAKS OF COLD SORES as needed    Herpes simplex virus infection       vortioxetine 5 MG tablet    TRINTELLIX/BRINTELLIX     Take 1 tablet (5 mg) by mouth daily    Depression, unspecified depression type

## 2017-07-15 ASSESSMENT — ANXIETY QUESTIONNAIRES: GAD7 TOTAL SCORE: 4

## 2017-07-21 ENCOUNTER — TELEPHONE (OUTPATIENT)
Dept: FAMILY MEDICINE | Facility: CLINIC | Age: 72
End: 2017-07-21

## 2017-07-21 DIAGNOSIS — R11.0 NAUSEA: ICD-10-CM

## 2017-07-21 RX ORDER — METOCLOPRAMIDE 5 MG/1
5 TABLET ORAL 4 TIMES DAILY PRN
Qty: 40 TABLET | Refills: 0 | Status: SHIPPED | OUTPATIENT
Start: 2017-07-21 | End: 2017-08-14

## 2017-07-21 NOTE — TELEPHONE ENCOUNTER
Patient came into the clinic to drop a form off and said she needed refills; med list was printed and she marked what was needed. The Furosemide is not on her list  Patient is going out of town Monday and is out of her metoclopramide (REGLAN) 5 MG tablet and furosemide 10 mg? (not on pt list and she didn't know)  Patient can be reached at [phone]    metoclopramide (REGLAN) 5 MG tablet      Last Written Prescription Date: 6/2/17  Last Fill Quantity: 40,  # refills: 0   Last Office Visit with [facility] or  Health prescribing provider: 7/14/17                                         Next 5 appointments (look out 90 days)     Sep 20, 2017  1:30 PM CDT   Return Visit with  Oncology Nurse   [facility] ([facility])    [place] [id number]  [place] [address]   [phone]            Sep 20, 2017  2:00 PM CDT   Return Visit with Nahum Pa MD   [facility] ([facility])    [place] [id number]  [place] [address]   [phone]                  furosemide 10 mg      Last Written Prescription Date: ?  Last Fill Quantity: ?, # refills: ?  Last Office Visit with [facility] or [facility] prescribing provider: 7/14/17  Next 5 appointments (look out 90 days)     Sep 20, 2017  1:30 PM CDT   Return Visit with  Oncology Nurse   [facility] ([facility])    [facility]  [address]  [place] [address]   [phone]            Sep 20, 2017  2:00 PM CDT   Return Visit with Nahum Pa MD   [facility]    [facility]  [address]  [place] [address]   [phone]                   Potassium   Date Value Ref Range Status   07/10/2017 4.8 3.4 - 5.3 mmol/L Final     Creatinine   Date Value Ref Range Status   07/10/2017 1.15 (H) 0.52 - 1.04 mg/dL Final      BP Readings from Last 3 Encounters:   07/14/17 104/70   07/10/17 106/73   07/07/17 (!) 82/51       Jennifer ROGERS

## 2017-07-21 NOTE — TELEPHONE ENCOUNTER
Routing refill request to provider for review/approval because:  Drug not on the [facility] refill protocol - Reglan  Drug not active on patient's medication list - margareth Rock RN

## 2017-07-21 NOTE — TELEPHONE ENCOUNTER
Unemployment Medical Statement dropped off by patient  Also left the original of her [facility] discharge papers  TC made copy for clinic and will mail orig back with the original Medical Statement form with confirmation sheet that fax went through [phone]  Jennifer ROGERS

## 2017-07-24 ENCOUNTER — RECORDS - HEALTHEAST (OUTPATIENT)
Dept: ADMINISTRATIVE | Facility: OTHER | Age: 72
End: 2017-07-24

## 2017-07-24 ENCOUNTER — RECORDS - HEALTHEAST (OUTPATIENT)
Dept: BEHAVIORAL HEALTH | Facility: CLINIC | Age: 72
End: 2017-07-24

## 2017-07-24 DIAGNOSIS — F10.20 ALCOHOL USE DISORDER, SEVERE, DEPENDENCE (H): ICD-10-CM

## 2017-07-24 DIAGNOSIS — M79.10 MYALGIA: ICD-10-CM

## 2017-07-24 DIAGNOSIS — Z98.84 H/O GASTRIC BYPASS: ICD-10-CM

## 2017-07-24 ASSESSMENT — MIFFLIN-ST. JEOR: SCORE: 1241.11

## 2017-07-31 NOTE — PROGRESS NOTES
Provider: Nahum Pa MD  Patient:  Kavitha Headley  MRN:  [id number]  :  1945    DOS: 2017    HEMATOLOGY HISTORY: Ms. Headley is a 71 year old female with thrombocytopenia.  1.  On 2017, WBC of 8.1, hemoglobin of 13.6 and platelets of 242.     2.  On 2017:  -Platelets of 217.  Her platelets then started to progressively decrease.  It decreased to 51 on 2017.  WBC has remained normal.  Hemoglobin decreased slightly to 11.3.   -Chemistry panel on 2017 revealed elevated AST, ALT, alkaline phosphatase and bilirubin.   3.  Ultrasound abdomen on 2017 reveals fatty infiltration of the liver.    4. Hepatitis B and C on 2017 are negative.   5.  Multiple labs done on 2017:   -INR 1.15.   -Fibrinogen of 194.   -D-dimer of 1.8.   -HIT antibody negative.   6. EUS on 2017 revealed changes of pancreatitis.  7. On 2017:  -Vitamin B12 of 1832  -Folate of 26.9  8. CT abdomen and pelvis on 2017 revealed diffuse fatty infiltration of liver. Spleen size is normal.  There are scattered bilateral inguinal lymph node.  Largest is 2.6 cm.  It appears to have a fatty hilum.  Probably benign.    SUBJECTIVE:    Ms. Headley is a 71-year-old female who was recently seen in the hospital for thrombocytopenia.  The patient was admitted to the hospital with bilateral lower extremity cellulitis and abnormal liver function tests.  The patient has multiple medical problems including hypertension, dyslipidemia and alcohol abuse.  Before hospitalization, the patient had increased her alcohol intake because of stress.      The patient had multiple investigations done in the hospital for thrombocytopenia.  HIT was ruled out.  Vitamin B12 and folate were all normal.  It was felt that her thrombocytopenia was due to multiple factors including cellulitis, antibiotics and liver dysfunction.  There might have been a component of low-grade DIC.  The platelets had decreased from 217 to  51.  Then it improved to 106 on 06/16/2017.      The patient was in rehab after discharge.  The patient says that she has not had any more alcohol.  She is feeling better.      She is not in any pain.  No headache or dizziness.  No chest pain or difficulty breathing.  No abdominal pain, nausea or vomiting.  Appetite has been good.  No fever or chills.  No genitourinary or bowel complaints.  Denies any bleeding or easy bruising.  She has some leg swelling.      In the hospital, the patient had upper EUS done.  It revealed LA grade C esophagitis.  There were Otis-en-Y gastrojejunostomy changes.  There was some congestion, edema, erosion and erythema at the gastrojejunal anastomosis.  Endoscopic finding revealed mild chronic pancreatitis.  No malignancy.      PHYSICAL EXAMINATION:    GENERAL:  She is alert and oriented x3.  VITAL SIGNS: Reviewed.  EYES:  No icterus.  EXTREMITIES:  Bilateral edema.  No redness or tenderness.  No signs of infection.    ASSESSMENT:    1.  A 71-year-old female with thrombocytopenia.  2.  Normochromic normocytic anemia.    3.  Alcoholic liver disease.  4.  Lower extremity edema.    PLAN:    1.  The patient is doing better.  She is no longer jaundiced.  She feels stronger.  Appetite has been good.  She is not bleeding or bruising.  Most importantly, she has not had any alcohol.      I explained to the patient that her thrombocytopenia is due to multiple reasons.  Since her platelets were normal before hospitalization, it was mainly due to infection and antibiotics.  Having liver disease made her more susceptible to become thrombocytopenic.  The patient also has anemia.  Again, this could be due to multiple factors including liver disease.      I am hoping that her counts have improved since she is no longer drinking alcohol.  Also, she is no longer on antibiotics.  Will recheck her CBC today.      2.  Discussed regarding alcoholic liver disease.  She has stopped alcohol.  She will continue  to follow up with gastroenterology.  Will check her CMP.      3.  She had a few questions, which were all answered.  I will see her in 3 months' time.  She will be seen sooner if labs are highly abnormal.  The patient encouraged not to resume alcohol.  Advised her to call us if she is bleeding from any site, easy bruising, worsening weakness or any other concerns.      Total time spent 30 minutes, more than half the time spent in counseling.     ADDENDUM:   Labs reviewed. Platelet is 641. Normal WBC. Hemoglobin is lower at 9.0. CMP is better. Bilirubin is decreasing.

## 2017-08-02 ENCOUNTER — HOSPITAL ENCOUNTER (OUTPATIENT)
Dept: CARDIOLOGY | Facility: CLINIC | Age: 72
Discharge: HOME | End: 2017-08-02
Attending: FAMILY MEDICINE | Admitting: FAMILY MEDICINE
Payer: MEDICARE

## 2017-08-02 DIAGNOSIS — I35.0 AORTIC VALVE STENOSIS, UNSPECIFIED ETIOLOGY: ICD-10-CM

## 2017-08-02 PROCEDURE — 93306 TTE W/DOPPLER COMPLETE: CPT | Mod: 26 | Performed by: INTERNAL MEDICINE

## 2017-08-02 PROCEDURE — 25500064 ZZH RX 255 OP 636: Performed by: FAMILY MEDICINE

## 2017-08-02 PROCEDURE — 40000264 ECHO COMPLETE WITH OPTISON

## 2017-08-02 RX ADMIN — HUMAN ALBUMIN MICROSPHERES AND PERFLUTREN 2 ML: 10; .22 INJECTION, SOLUTION INTRAVENOUS at 14:42

## 2017-08-03 ENCOUNTER — OFFICE VISIT (OUTPATIENT)
Dept: CARDIOLOGY | Facility: CLINIC | Age: 72
End: 2017-08-03
Attending: FAMILY MEDICINE
Payer: COMMERCIAL

## 2017-08-03 VITALS
DIASTOLIC BLOOD PRESSURE: 87 MMHG | HEIGHT: 64 IN | BODY MASS INDEX: 30.05 KG/M2 | WEIGHT: 176 LBS | HEART RATE: 88 BPM | SYSTOLIC BLOOD PRESSURE: 144 MMHG

## 2017-08-03 DIAGNOSIS — I35.0 AORTIC VALVE STENOSIS, UNSPECIFIED ETIOLOGY: Primary | ICD-10-CM

## 2017-08-03 DIAGNOSIS — I35.0 AORTIC VALVE STENOSIS, UNSPECIFIED ETIOLOGY: ICD-10-CM

## 2017-08-03 DIAGNOSIS — R06.09 DYSPNEA ON EXERTION: ICD-10-CM

## 2017-08-03 DIAGNOSIS — I47.10 PAROXYSMAL SUPRAVENTRICULAR TACHYCARDIA (H): ICD-10-CM

## 2017-08-03 LAB
ALBUMIN SERPL-MCNC: 3.2 G/DL (ref 3.4–5)
ALP SERPL-CCNC: 94 U/L (ref 40–150)
ALT SERPL W P-5'-P-CCNC: 31 U/L (ref 0–50)
ANION GAP SERPL CALCULATED.3IONS-SCNC: 7 MMOL/L (ref 3–14)
AST SERPL W P-5'-P-CCNC: 31 U/L (ref 0–45)
BILIRUB SERPL-MCNC: 0.6 MG/DL (ref 0.2–1.3)
BUN SERPL-MCNC: 17 MG/DL (ref 7–30)
CALCIUM SERPL-MCNC: 9.4 MG/DL (ref 8.5–10.1)
CHLORIDE SERPL-SCNC: 104 MMOL/L (ref 94–109)
CO2 SERPL-SCNC: 25 MMOL/L (ref 20–32)
CREAT SERPL-MCNC: 1.01 MG/DL (ref 0.52–1.04)
ERYTHROCYTE [DISTWIDTH] IN BLOOD BY AUTOMATED COUNT: 14.9 % (ref 10–15)
GFR SERPL CREATININE-BSD FRML MDRD: 54 ML/MIN/1.7M2
GLUCOSE SERPL-MCNC: 109 MG/DL (ref 70–99)
HCT VFR BLD AUTO: 32.7 % (ref 35–47)
HGB BLD-MCNC: 10.8 G/DL (ref 11.7–15.7)
MCH RBC QN AUTO: 33.6 PG (ref 26.5–33)
MCHC RBC AUTO-ENTMCNC: 33 G/DL (ref 31.5–36.5)
MCV RBC AUTO: 102 FL (ref 78–100)
PLATELET # BLD AUTO: 355 10E9/L (ref 150–450)
POTASSIUM SERPL-SCNC: 4.5 MMOL/L (ref 3.4–5.3)
PROT SERPL-MCNC: 7.8 G/DL (ref 6.8–8.8)
RBC # BLD AUTO: 3.21 10E12/L (ref 3.8–5.2)
SODIUM SERPL-SCNC: 136 MMOL/L (ref 133–144)
TSH SERPL DL<=0.005 MIU/L-ACNC: 0.58 MU/L (ref 0.4–4)
WBC # BLD AUTO: 6 10E9/L (ref 4–11)

## 2017-08-03 PROCEDURE — 36415 COLL VENOUS BLD VENIPUNCTURE: CPT | Performed by: INTERNAL MEDICINE

## 2017-08-03 PROCEDURE — 84443 ASSAY THYROID STIM HORMONE: CPT | Performed by: INTERNAL MEDICINE

## 2017-08-03 PROCEDURE — 99214 OFFICE O/P EST MOD 30 MIN: CPT | Mod: 25 | Performed by: INTERNAL MEDICINE

## 2017-08-03 PROCEDURE — 85027 COMPLETE CBC AUTOMATED: CPT | Performed by: INTERNAL MEDICINE

## 2017-08-03 PROCEDURE — 80053 COMPREHEN METABOLIC PANEL: CPT | Performed by: INTERNAL MEDICINE

## 2017-08-03 PROCEDURE — 93000 ELECTROCARDIOGRAM COMPLETE: CPT | Performed by: INTERNAL MEDICINE

## 2017-08-03 NOTE — LETTER
8/3/2017    Alison Pena MD  [facility] [place]  [address] [place] [address] [place] [address]    RE: Kavitha Headley       Dear Colleague,    I had the pleasure of seeing Kavitha Phamyder in the [facility].    DIAGNOSES:      Encounter Diagnosis   Name Primary?     Aortic valve stenosis, unspecified etiology Yes         HPI:   is a pleasant 71-year-old lady who comes in routine follow-up. I last saw the patient in 2015 with a history of a sensation of heart racing. As a patch monitor showed 18 runs of SVT which were asymptomatic. She had brief episodes of chest tightness and shortness of breath that were associated with occasional PVCs. The patient has known moderate aortic stenosis with a trileaflet valve and mild aortic dilatation mostly recently measured at 4.1 cm on an echocardiogram done just prior to this visit    She's had paroxysms of shortness of breath and we had proceeded to angiography at that time with only scattered obstructive disease. She has had previously described hyperdynamic function however subvalvular gradient has not been thoroughly assessed. Most recent echocardiogram in fact described severe aortic stenosis by valve area, moderate by gradient with a dimensionless index suggestive of moderate stenosis. Unfortunately again subvalvular and intraventricular gradients were not assessed. The valve area was different in comparison to last echo just in June.    She denies lightheadedness or palpitations or chest discomfort but does think that her dyspnea on exertion has gotten worse over the past 6-8 months. Of note, her hemoglobin is 9.4 and she is unaware of the reason for her anemia. She did have a significant alcohol use history which was discontinued on June 1, 2017    She[place] get short of breath [place] from [place] parking lot and have to stop in weight. She is reportedly apparently on furosemide potassium as well as  spironolactone however none of these are outlined in the med list provided by our medical assistance and this will be read dressed.    MEDICATIONS:    Current Outpatient Prescriptions   Medication Sig Dispense Refill     metoclopramide (REGLAN) 5 MG tablet Take 1 tablet (5 mg) by mouth 4 times daily as needed 40 tablet 0     mirtazapine (REMERON) 7.5 MG TABS tablet Take 1 tablet (7.5 mg) by mouth At Bedtime 30 tablet 1     Temazepam (RESTORIL PO) Take by mouth At Bedtime       vortioxetine (TRINTELLIX/BRINTELLIX) 5 MG tablet Take 1 tablet (5 mg) by mouth daily       folic acid (FOLVITE) 1 MG tablet Take 1 tablet (1 mg) by mouth daily 30 tablet      thiamine 100 MG tablet Take 1 tablet (100 mg) by mouth daily       sucralfate (CARAFATE) 1 GM/10ML suspension Take 10 mLs (1 g) by mouth 4 times daily (before meals and nightly) 1200 mL      ranitidine (ZANTAC) 150 MG tablet Take 1 tablet (150 mg) by mouth 2 times daily 60 tablet      magnesium oxide (MAG-OX) 400 MG tablet Take 1 tablet (400 mg) by mouth daily 7 tablet      gabapentin (NEURONTIN) 300 MG capsule Take 1 capsule (300 mg) by mouth daily 90 capsule      polyethylene glycol (MIRALAX/GLYCOLAX) Packet Take 17 g by mouth daily as needed (constipation) 7 packet      senna-docusate (SENOKOT-S;PERICOLACE) 8.6-50 MG per tablet Take 1-2 tablets by mouth 2 times daily as needed for constipation 100 tablet      valACYclovir (VALTREX) 1000 mg tablet TAKE 2 TABS EVERY 12 HRS FOR 1 DAY ONLY FOR OUTBREAKS OF COLD SORES as needed 4 tablet 3     Multiple Vitamins-Minerals (CENTRUM SILVER) per tablet Take 1 tablet by mouth daily       clonazePAM (KLONOPIN) 0.5 MG tablet Take 0.5-1 tablets (0.25-0.5 mg) by mouth daily as needed (for alcohol withdrawl) 10 tablet 0         ALLERGIES     Allergies   Allergen Reactions     Bactrim [Sulfamethoxazole W/Trimethoprim] Hives     Codeine Itching     NAUSEA     Morphine Itching     NAUSEA       PAST MEDICAL HISTORY:  Past Medical History:    Diagnosis Date     Alcohol abuse     long term alcohol abuse     Anxiety disorder      Bariatric surgery status 1996?    gastric bypass, [facility] and     Benign hypertension      Chronic insomnia      Chronic low back pain      Chronic pain syndrome     Chronic back and neck pain, chronic pain due to osteoarthritis multiple joints     Coronary artery disease 9/2015    mild distal branch disease on cath     Disc disease, degenerative, cervical     C4-C7 disc disease     Diverticulitis     inpatient therapy 6/2006     Hip joint replacement status 4/2004    right     Knee joint replacement status 12/2005    left     Macrocytic anemia     Mild macrocytic anemia, 2012 to present, likely based on alcohol abuse.     Major depressive disorder, single episode, severe, without mention of psychotic behavior      Mixed hyperlipidemia      Moderate aortic stenosis 5/2014    mild/mod AS with peak gradient 33/mean gradient 20 mmHg, AV area 1.2     Pelvic relaxation disorder     Surgical intervention for cystocele/rectocele 3,11/2012     Personal history of urinary calculi 6/2006    left ureteral stone,lithotripsy     PVC (premature ventricular contraction)      Stage III chronic kidney disease 2005     SVT (supraventricular tachycardia) (H)     likely atrial tachycardia       PAST SURGICAL HISTORY:  Past Surgical History:   Procedure Laterality Date     APPENDECTOMY  3/2004    incidental     C GASTRIC BYPASS,OBESE<100CM ARIANNA-EN-Y  1996     C MEDIASTINOSCOPY W OR WO BIOPSY  2/2008    Videomediastinoscopy and, for mediastinal adenopathy -reactive lymphoid hyperplasia     C REPAIR OF RECTOCELE  3/2012     C TOTAL KNEE ARTHROPLASTY  12/2005    left      CARPAL TUNNEL RELEASE RT/LT  10/2010    Carpometacarpal excisional arthroplasty with a fascial autograft and APL suspension sling (92159). 2. Left thumb metacarpophalangeal joint fusion with autologous bone graft (87654). 3. Left endoscopic carpal tunnel release       CHOLECYSTECTOMY, LAPOROSCOPIC  11/2010    Cholecystectomy, Laparoscopic     COLONOSCOPY N/A 9/8/2016    Procedure: COMBINED COLONOSCOPY, SINGLE OR MULTIPLE BIOPSY/POLYPECTOMY BY BIOPSY;  Surgeon: Moe Barlow MD;  Location:  GI     CYSTOCELE REPAIR  11/2012    davinci laparoscopic sacrocolpopexy, enterocele repair, lysis of adhesions, placement of retropubic mid urethral sling, cystoscopy     CYSTOSCOPY, LITHOTRIPSY, COMBINED  6/2006    Left extracorporeal shock wave lithotripsy, cystoscopy, left ureteral stent placement.     CYSTOSCOPY, REMOVE STENT(S), COMBINED  7/2006    Cystoscopy, removal of left ureteral stent, retrograde pyelography, flexible and rigid ureteroscopy and holmium laser lithotripsy, basket removal of stone fragments, ureteral stent placement.      ENDOSCOPIC ULTRASOUND UPPER GASTROINTESTINAL TRACT (GI) N/A 6/12/2017    Procedure: ENDOSCOPIC ULTRASOUND, ESOPHAGOSCOPY / UPPER GASTROINTESTINAL TRACT (GI);  ENDOSCOPIC ULTRASOUND, ESOPHAGOSCOPY / UPPER GASTROINTESTINAL TRACT (GI);  Surgeon: Parth Graham MD;  Location:  GI     HERNIA REPAIR  4/2012    bilateral augmentation mastopexy, ventral hernia repair, and medial thigh liposuction on 04/06/2012.      HYSTERECTOMY VAGINAL, BILATERAL SALPINGO-OOPHERECTOMY, COMBINED  1998    due to myoma and bleeding     JOINT REPLACEMENT, HIP RT/LT  4/2004    right total hip arthroplasty     LAPAROTOMY, LYSIS ADHESIONS, COMBINED  3/2004    lysis adhesions, ventral hernia repair, appendectomy incidentally     LYMPH NODE BIOPSY  4/2008    right axillary, reactive follicular and paracortical hyperplasia.     MAMMOPLASTY AUGMENTATION BILATERAL  4/2012     REVISE RECONSTRUCTED BREAST  6/7/2012    Left breast capsulotomy.        FAMILY HISTORY:  Family History   Problem Relation Age of Onset     Substance Abuse Father      CANCER Father      throat and lung mets     DIABETES No family hx of      Coronary Artery Disease No family hx of      CEREBROVASCULAR  "DISEASE No family hx of        SOCIAL HISTORY:  Social History     Social History     Marital status:      Spouse name: Mt     Number of children: 4     Years of education: 18     Occupational History     nurse       [employer]     Social History Main Topics     Smoking status: Never Smoker     Smokeless tobacco: Never Used     Alcohol use 0.0 oz/week     0 Standard drinks or equivalent per week      Comment: 2-3 drinks per day     Drug use: No     Sexual activity: Yes     Partners: Male     Other Topics Concern     Blood Transfusions No     Caffeine Concern Yes     1-2 cups per day      Occupational Exposure Yes     blood     Hobby Hazards No     Sleep Concern Yes     Stress Concern Yes     Weight Concern Yes     gastric  byepass     Special Diet No     Back Care No     Exercise Yes     walk, swin     Bike Helmet No     Seat Belt Yes     Self-Exams Yes     Social History Narrative       Review of Systems:  Skin:  Negative     Eyes:  Negative    ENT:  Negative    Respiratory:  Negative    Cardiovascular:  Negative    Gastroenterology: Negative    Genitourinary:  Negative    Musculoskeletal:  Negative    Neurologic:  Negative    Psychiatric:  Negative    Heme/Lymph/Imm:  Negative    Endocrine:  Negative      Physical Exam:  Vitals: /87  Pulse 88  Ht 1.626 m (5' 4\")  Wt 79.8 kg (176 lb)  BMI 30.21 kg/m2    Constitutional:  cooperative, alert and oriented, well developed, well nourished, in no acute distress        Skin:  warm and dry to the touch, no apparent skin lesions or masses noted venous stasis changes      Head:  normocephalic, no masses or lesions        Eyes:  pupils equal and round, conjunctivae and lids unremarkable, sclera white, no xanthalasma, EOMS intact, no nystagmus        ENT:  no pallor or cyanosis, dentition good        Neck:  carotid pulses are full and equal bilaterally, JVP normal, no carotid bruit, no thyromegaly        Chest:  normal breath sounds, clear to auscultation, " normal A-P diameter, normal symmetry, normal respiratory excursion, no use of accessory muscles        Cardiac:               S1, S2 regular with 2-3/5 mid-peaking ESM RUSB radiation to apex and throughout precordium. No rub nor clear gallop. No RV lift. PMI discrete, nondisplaced    Abdomen:  abdomen soft, non-tender, BS normoactive, no mass, no HSM, no bruits        Vascular: pulses full and equal, no bruits auscultated                                        Extremities and Back:  no deformities, clubbing, cyanosis, erythema observed erythema;stasis pigmentation bilateral LE edema;2+     Swelling of the lower extremities, increased in left leg; calf tenderness; varicose veins; erythema, induration    Neurological:  affect appropriate, oriented to time, person and place;no gross motor deficits          ASSESSMENT AND PLAN:    71-year-old lady with prior alcohol abuse and moderate aortic stenosis as well as mild mitral stenosis on echocardiogram. She has nonobstructive coronary disease on cardiac catheterization in 2015 which was really quite mild. She appears on echo to likely have an outflow obstruction of some sort and I will ask for Limited echo images which should be of no charge to reassess outflow gradient. Unfortunately, the patient states she was on a low dose of metoprolol such as 12.5 mg daily and was markedly hypotensive at an earlier visit in July which caused this to be discontinued. This is concerning in the sense that heart rate control may be difficult to achieve as required by mitral stenosis and would accompany the addition of an beta blocker or calcium channel blocker as a negative inotrope given her outflow gradient.    Certainly any exacerbating features to her hyper per dynamic left ventricular systolic function and tachycardia such as the anemia and intravascular volume depletion should be avoided. She has long-standing lower extremity edema on the basis of venous disease for which she was  previously seeing Dr. Lugo and systemic diuretics may not be an optimal approach to this. I've recommended that she resume the compression stockings she previously had been counseled to use    At this point, we will redo full labs and have our staff but together comprehensive medication list for the patient. We will do a limited echo images to assess outflow gradient and I might consider a 48 hour Holter monitor to cervical for occult arrhythmias which could impact on her symptoms. We'll have her back in follow-up and make further recommendations at that time    Total time 30 minutes 25 minutes in coordination of care and counseling    ORDERS AT TODAY'S VISIT:    Orders Placed This Encounter   Procedures     CBC with platelets     Comprehensive metabolic panel     TSH with free T4 reflex     Follow-Up with Cardiologist     Echocardiogram Limited     Thank you for allowing me to participate in the care of your patient.    Sincerely,     Nayeli Bartholomew MD     [facility]

## 2017-08-03 NOTE — PROGRESS NOTES
DIAGNOSES:      Encounter Diagnosis   Name Primary?     Aortic valve stenosis, unspecified etiology Yes         HPI:   is a pleasant 71-year-old lady who comes in routine follow-up. I last saw the patient in 2015 with a history of a sensation of heart racing. As a patch monitor showed 18 runs of SVT which were asymptomatic. She had brief episodes of chest tightness and shortness of breath that were associated with occasional PVCs. The patient has known moderate aortic stenosis with a trileaflet valve and mild aortic dilatation mostly recently measured at 4.1 cm on an echocardiogram done just prior to this visit    She's had paroxysms of shortness of breath and we had proceeded to angiography at that time with only scattered obstructive disease. She has had previously described hyperdynamic function however subvalvular gradient has not been thoroughly assessed. Most recent echocardiogram in fact described severe aortic stenosis by valve area, moderate by gradient with a dimensionless index suggestive of moderate stenosis. Unfortunately again subvalvular and intraventricular gradients were not assessed. The valve area was different in comparison to last echo just in June.    She denies lightheadedness or palpitations or chest discomfort but does think that her dyspnea on exertion has gotten worse over the past 6-8 months. Of note, her hemoglobin is 9.4 and she is unaware of the reason for her anemia. She did have a significant alcohol use history which was discontinued on June 1, 2017    She[place] get [place] of breath [place] parking [place] and have to stop in weight. She is reportedly apparently on furosemide potassium as well as spironolactone however none of these are outlined in the med list provided by our medical assistance and this will be read dressed.    MEDICATIONS:    Current Outpatient Prescriptions   Medication Sig Dispense Refill     metoclopramide (REGLAN) 5 MG tablet  Take 1 tablet (5 mg) by mouth 4 times daily as needed 40 tablet 0     mirtazapine (REMERON) 7.5 MG TABS tablet Take 1 tablet (7.5 mg) by mouth At Bedtime 30 tablet 1     Temazepam (RESTORIL PO) Take by mouth At Bedtime       vortioxetine (TRINTELLIX/BRINTELLIX) 5 MG tablet Take 1 tablet (5 mg) by mouth daily       folic acid (FOLVITE) 1 MG tablet Take 1 tablet (1 mg) by mouth daily 30 tablet      thiamine 100 MG tablet Take 1 tablet (100 mg) by mouth daily       sucralfate (CARAFATE) 1 GM/10ML suspension Take 10 mLs (1 g) by mouth 4 times daily (before meals and nightly) 1200 mL      ranitidine (ZANTAC) 150 MG tablet Take 1 tablet (150 mg) by mouth 2 times daily 60 tablet      magnesium oxide (MAG-OX) 400 MG tablet Take 1 tablet (400 mg) by mouth daily 7 tablet      gabapentin (NEURONTIN) 300 MG capsule Take 1 capsule (300 mg) by mouth daily 90 capsule      polyethylene glycol (MIRALAX/GLYCOLAX) Packet Take 17 g by mouth daily as needed (constipation) 7 packet      senna-docusate (SENOKOT-S;PERICOLACE) 8.6-50 MG per tablet Take 1-2 tablets by mouth 2 times daily as needed for constipation 100 tablet      valACYclovir (VALTREX) 1000 mg tablet TAKE 2 TABS EVERY 12 HRS FOR 1 DAY ONLY FOR OUTBREAKS OF COLD SORES as needed 4 tablet 3     Multiple Vitamins-Minerals (CENTRUM SILVER) per tablet Take 1 tablet by mouth daily       clonazePAM (KLONOPIN) 0.5 MG tablet Take 0.5-1 tablets (0.25-0.5 mg) by mouth daily as needed (for alcohol withdrawl) 10 tablet 0         ALLERGIES     Allergies   Allergen Reactions     Bactrim [Sulfamethoxazole W/Trimethoprim] Hives     Codeine Itching     NAUSEA     Morphine Itching     NAUSEA       PAST MEDICAL HISTORY:  Past Medical History:   Diagnosis Date     Alcohol abuse     long term alcohol abuse     Anxiety disorder      Bariatric surgery status 1996?    gastric bypass, [facility] and     Benign hypertension      Chronic insomnia      Chronic low back pain      Chronic pain syndrome      Chronic back and neck pain, chronic pain due to osteoarthritis multiple joints     Coronary artery disease 9/2015    mild distal branch disease on cath     Disc disease, degenerative, cervical     C4-C7 disc disease     Diverticulitis     inpatient therapy 6/2006     Hip joint replacement status 4/2004    right     Knee joint replacement status 12/2005    left     Macrocytic anemia     Mild macrocytic anemia, 2012 to present, likely based on alcohol abuse.     Major depressive disorder, single episode, severe, without mention of psychotic behavior      Mixed hyperlipidemia      Moderate aortic stenosis 5/2014    mild/mod AS with peak gradient 33/mean gradient 20 mmHg, AV area 1.2     Pelvic relaxation disorder     Surgical intervention for cystocele/rectocele 3,11/2012     Personal history of urinary calculi 6/2006    left ureteral stone,lithotripsy     PVC (premature ventricular contraction)      Stage III chronic kidney disease 2005     SVT (supraventricular tachycardia) (H)     likely atrial tachycardia       PAST SURGICAL HISTORY:  Past Surgical History:   Procedure Laterality Date     APPENDECTOMY  3/2004    incidental     C GASTRIC BYPASS,OBESE<100CM ARIANNA-EN-Y  1996     C MEDIASTINOSCOPY W OR WO BIOPSY  2/2008    Videomediastinoscopy and, for mediastinal adenopathy -reactive lymphoid hyperplasia     C REPAIR OF RECTOCELE  3/2012     C TOTAL KNEE ARTHROPLASTY  12/2005    left      CARPAL TUNNEL RELEASE RT/LT  10/2010    Carpometacarpal excisional arthroplasty with a fascial autograft and APL suspension sling (66434). 2. Left thumb metacarpophalangeal joint fusion with autologous bone graft ([id number]). 3. Left endoscopic carpal tunnel release      CHOLECYSTECTOMY, LAPOROSCOPIC  11/2010    Cholecystectomy, Laparoscopic     COLONOSCOPY N/A 9/8/2016    Procedure: COMBINED COLONOSCOPY, SINGLE OR MULTIPLE BIOPSY/POLYPECTOMY BY BIOPSY;  Surgeon: Moe Barlow MD;  Location:  GI     CYSTOCELE REPAIR  11/2012     jose carlos laparoscopic sacrocolpopexy, enterocele repair, lysis of adhesions, placement of retropubic mid urethral sling, cystoscopy     CYSTOSCOPY, LITHOTRIPSY, COMBINED  6/2006    Left extracorporeal shock wave lithotripsy, cystoscopy, left ureteral stent placement.     CYSTOSCOPY, REMOVE STENT(S), COMBINED  7/2006    Cystoscopy, removal of left ureteral stent, retrograde pyelography, flexible and rigid ureteroscopy and holmium laser lithotripsy, basket removal of stone fragments, ureteral stent placement.      ENDOSCOPIC ULTRASOUND UPPER GASTROINTESTINAL TRACT (GI) N/A 6/12/2017    Procedure: ENDOSCOPIC ULTRASOUND, ESOPHAGOSCOPY / UPPER GASTROINTESTINAL TRACT (GI);  ENDOSCOPIC ULTRASOUND, ESOPHAGOSCOPY / UPPER GASTROINTESTINAL TRACT (GI);  Surgeon: Parth Graham MD;  Location:  GI     HERNIA REPAIR  4/2012    bilateral augmentation mastopexy, ventral hernia repair, and medial thigh liposuction on 04/06/2012.      HYSTERECTOMY VAGINAL, BILATERAL SALPINGO-OOPHERECTOMY, COMBINED  1998    due to myoma and bleeding     JOINT REPLACEMENT, HIP RT/LT  4/2004    right total hip arthroplasty     LAPAROTOMY, LYSIS ADHESIONS, COMBINED  3/2004    lysis adhesions, ventral hernia repair, appendectomy incidentally     LYMPH NODE BIOPSY  4/2008    right axillary, reactive follicular and paracortical hyperplasia.     MAMMOPLASTY AUGMENTATION BILATERAL  4/2012     REVISE RECONSTRUCTED BREAST  6/7/2012    Left breast capsulotomy.        FAMILY HISTORY:  Family History   Problem Relation Age of Onset     Substance Abuse Father      CANCER Father      throat and lung mets     DIABETES No family hx of      Coronary Artery Disease No family hx of      CEREBROVASCULAR DISEASE No family hx of        SOCIAL HISTORY:  Social History     Social History     Marital status:      Spouse name: Mt     Number of children: 4     Years of education: 18     Occupational History     nurse       [employer]     Social History Main Topics  "    Smoking status: Never Smoker     Smokeless tobacco: Never Used     Alcohol use 0.0 oz/week     0 Standard drinks or equivalent per week      Comment: 2-3 drinks per day     Drug use: No     Sexual activity: Yes     Partners: Male     Other Topics Concern     Blood Transfusions No     Caffeine Concern Yes     1-2 cups per day      Occupational Exposure Yes     blood     Hobby Hazards No     Sleep Concern Yes     Stress Concern Yes     Weight Concern Yes     gastric  byepass     Special Diet No     Back Care No     Exercise Yes     walk, swin     Bike Helmet No     Seat Belt Yes     Self-Exams Yes     Social History Narrative       Review of Systems:  Skin:  Negative     Eyes:  Negative    ENT:  Negative    Respiratory:  Negative    Cardiovascular:  Negative    Gastroenterology: Negative    Genitourinary:  Negative    Musculoskeletal:  Negative    Neurologic:  Negative    Psychiatric:  Negative    Heme/Lymph/Imm:  Negative    Endocrine:  Negative      Physical Exam:  Vitals: /87  Pulse 88  Ht 1.626 m (5' 4\")  Wt 79.8 kg (176 lb)  BMI 30.21 kg/m2    Constitutional:  cooperative, alert and oriented, well developed, well nourished, in no acute distress        Skin:  warm and dry to the touch, no apparent skin lesions or masses noted venous stasis changes      Head:  normocephalic, no masses or lesions        Eyes:  pupils equal and round, conjunctivae and lids unremarkable, sclera white, no xanthalasma, EOMS intact, no nystagmus        ENT:  no pallor or cyanosis, dentition good        Neck:  carotid pulses are full and equal bilaterally, JVP normal, no carotid bruit, no thyromegaly        Chest:  normal breath sounds, clear to auscultation, normal A-P diameter, normal symmetry, normal respiratory excursion, no use of accessory muscles        Cardiac:               S1, S2 regular with 2-3/5 mid-peaking ESM RUSB radiation to apex and throughout precordium. No rub nor clear gallop. No RV lift. PMI discrete, " nondisplaced    Abdomen:  abdomen soft, non-tender, BS normoactive, no mass, no HSM, no bruits        Vascular: pulses full and equal, no bruits auscultated                                        Extremities and Back:  no deformities, clubbing, cyanosis, erythema observed erythema;stasis pigmentation bilateral LE edema;2+     Swelling of the lower extremities, increased in left leg; calf tenderness; varicose veins; erythema, induration    Neurological:  affect appropriate, oriented to time, person and place;no gross motor deficits          ASSESSMENT AND PLAN:    71-year-old lady with prior alcohol abuse and moderate aortic stenosis as well as mild mitral stenosis on echocardiogram. She has nonobstructive coronary disease on cardiac catheterization in 2015 which was really quite mild. She appears on echo to likely have an outflow obstruction of some sort and I will ask for Limited echo images which should be of no charge to reassess outflow gradient. Unfortunately, the patient states she was on a low dose of metoprolol such as 12.5 mg daily and was markedly hypotensive at an earlier visit in July which caused this to be discontinued. This is concerning in the sense that heart rate control may be difficult to achieve as required by mitral stenosis and would accompany the addition of an beta blocker or calcium channel blocker as a negative inotrope given her outflow gradient.    Certainly any exacerbating features to her hyper per dynamic left ventricular systolic function and tachycardia such as the anemia and intravascular volume depletion should be avoided. She has long-standing lower extremity edema on the basis of venous disease for which she was previously seeing Dr. Lugo and systemic diuretics may not be an optimal approach to this. I've recommended that she resume the compression stockings she previously had been counseled to use    At this point, we will redo full labs and have our staff but together  comprehensive medication list for the patient. We will do a limited echo images to assess outflow gradient and I might consider a 48 hour Holter monitor to cervical for occult arrhythmias which could impact on her symptoms. We'll have her back in follow-up and make further recommendations at that time    Total time 30 minutes 25 minutes in coordination of care and counseling    ORDERS AT TODAY'S VISIT:    Orders Placed This Encounter   Procedures     CBC with platelets     Comprehensive metabolic panel     TSH with free T4 reflex     Follow-Up with Cardiologist     Echocardiogram Limited           CC  Alison Pena MD  [facility]  [address] [place] [address]

## 2017-08-03 NOTE — MR AVS SNAPSHOT
After Visit Summary   8/3/2017    Kavitha Headley    MRN: [id number]           Patient Information     Date Of Birth          1945        Visit Information        Provider Department      8/3/2017 12:45 PM Nayeli Bartholomew MD [facility] [place]        Today's Diagnoses     Aortic valve stenosis, unspecified etiology    -  1    Dyspnea on exertion        Paroxysmal supraventricular tachycardia (H)           Follow-ups after your visit        Additional Services     Follow-Up with Cardiologist                 Your next 10 appointments already scheduled     Sep 20, 2017  1:30 PM CDT   Return Visit with  Oncology Nurse   [facility]    [facility]  [address]  [place] [address] [phone]            Sep 20, 2017  2:00 PM CDT   Return Visit with Nahum Pa MD   [facility])    [facility] [place]  [address]  [place] [id number] [phone]              Future tests that were ordered for you today     Open Future Orders        Priority Expected Expires Ordered    Holter Monitor 48 hour - Adult Routine 8/3/2017 9/17/2017 8/3/2017    CBC with platelets Routine 8/3/2017 8/3/2018 8/3/2017    Comprehensive metabolic panel Routine 8/3/2017 9/1/2017 8/3/2017    TSH with free T4 reflex Routine 8/3/2017 8/3/2018 8/3/2017    Echocardiogram Limited Routine 8/3/2017 8/3/2018 8/3/2017    Follow-Up with Cardiologist Routine 8/10/2017 8/3/2019 8/3/2017    ECHO COMPLETE WITH OPTISON Routine  5/9/2018 5/9/2017            Who to contact     If you have questions or need follow up information about today's clinic visit or your schedule please contact [facility] [place] directly at [phone].  Normal or non-critical lab and imaging results will be communicated to you by MyChart, letter  "or phone within 4 business days after the clinic has received the results. If you do not hear from us within 7 days, please contact the clinic through [employer] or phone. If you have a critical or abnormal lab result, we will notify you by phone as soon as possible.  Submit refill requests through [employer] or call your pharmacy and they will forward the refill request to us. Please allow 3 business days for your refill to be completed.          Additional Information About Your Visit        Z[employer]obe Information     [employer] gives you secure access to your electronic health record. If you see a primary care provider, you can also send messages to your care team and make appointments. If you have questions, please call your primary care clinic.  If you do not have a primary care provider, please call [phone] and they will assist you.        Care EveryWhere ID     This is your Care EveryWhere ID. This could be used by other organizations to access your [facility] medical records  [id number]        Your Vitals Were     Pulse Height BMI (Body Mass Index)             88 1.626 m (5' 4\") 30.21 kg/m2          Blood Pressure from Last 3 Encounters:   08/03/17 144/87   07/14/17 104/70   07/10/17 106/73    Weight from Last 3 Encounters:   08/03/17 79.8 kg (176 lb)   07/14/17 81.1 kg (178 lb 12.8 oz)   07/10/17 84.3 kg (185 lb 12.8 oz)              We Performed the Following     EKG 12-lead complete w/read - Clinics (performed today)        Primary Care Provider Office Phone # Fax #    Alison Pena [phone]       [facility] [place] [address] [place] [address]        Equal Access to Services     MACIEJ BUSH AH: Hadii юлия Barrios, waaxda luqadaha, qaybta kaalmada alanyada, sky king. So [facility] [phone].    ATENCIÓN: Si habla español, tiene a gaytan disposición servicios gratuitos de asistencia lingüística. Llame al [phone].    We comply " with applicable federal civil rights laws and [place] laws. We do not discriminate on the basis of race, color, national origin, age, disability sex, sexual orientation or gender identity.            Thank you!     Thank you for choosing [facility] [place]  for your care. Our goal is always to provide you with excellent care. Hearing back from our patients is one way we can continue to improve our services. Please take a few minutes to complete the written survey that you may receive in the mail after your visit with us. Thank you!             Your Updated Medication List - Protect others around you: Learn how to safely use, store and throw away your medicines at www.disposemymeds.org.          This list is accurate as of: 8/3/17  2:07 PM.  Always use your most recent med list.                   Brand Name Dispense Instructions for use Diagnosis    CENTRUM SILVER per tablet      Take 1 tablet by mouth daily        clonazePAM 0.5 MG tablet    klonoPIN    10 tablet    Take 0.5-1 tablets (0.25-0.5 mg) by mouth daily as needed (for alcohol withdrawl)    Alcohol withdrawal, uncomplicated (H)       folic acid 1 MG tablet    FOLVITE    30 tablet    Take 1 tablet (1 mg) by mouth daily    Alcohol abuse       gabapentin 300 MG capsule    NEURONTIN    90 capsule    Take 1 capsule (300 mg) by mouth daily    Chronic pain syndrome       magnesium oxide 400 MG tablet    MAG-OX    7 tablet    Take 1 tablet (400 mg) by mouth daily    Low magnesium levels       metoclopramide 5 MG tablet    REGLAN    40 tablet    Take 1 tablet (5 mg) by mouth 4 times daily as needed    Nausea       mirtazapine 7.5 MG Tabs tablet    REMERON    30 tablet    Take 1 tablet (7.5 mg) by mouth At Bedtime    Depression, unspecified depression type       polyethylene glycol Packet    MIRALAX/GLYCOLAX    7 packet    Take 17 g by mouth daily as needed (constipation)    Constipation, unspecified constipation type        ranitidine 150 MG tablet    ZANTAC    60 tablet    Take 1 tablet (150 mg) by mouth 2 times daily    Gastroesophageal reflux disease, esophagitis presence not specified, Esophagitis       RESTORIL PO      Take by mouth At Bedtime    Thrombocytopenia (H), Liver disease, chronic, due to alcohol (H)       senna-docusate 8.6-50 MG per tablet    SENOKOT-S;PERICOLACE    100 tablet    Take 1-2 tablets by mouth 2 times daily as needed for constipation    Constipation, unspecified constipation type       sucralfate 1 GM/10ML suspension    CARAFATE    1200 mL    Take 10 mLs (1 g) by mouth 4 times daily (before meals and nightly)    Esophagitis       thiamine 100 MG tablet      Take 1 tablet (100 mg) by mouth daily    Alcohol abuse       valACYclovir 1000 mg tablet    VALTREX    4 tablet    TAKE 2 TABS EVERY 12 HRS FOR 1 DAY ONLY FOR OUTBREAKS OF COLD SORES as needed    Herpes simplex virus infection       vortioxetine 5 MG tablet    TRINTELLIX/BRINTELLIX     Take 1 tablet (5 mg) by mouth daily    Depression, unspecified depression type

## 2017-08-07 ENCOUNTER — TRANSFERRED RECORDS (OUTPATIENT)
Dept: HEALTH INFORMATION MANAGEMENT | Facility: CLINIC | Age: 72
End: 2017-08-07

## 2017-08-08 DIAGNOSIS — G89.29 CHRONIC LEFT-SIDED LOW BACK PAIN WITHOUT SCIATICA: ICD-10-CM

## 2017-08-08 DIAGNOSIS — M54.50 CHRONIC LEFT-SIDED LOW BACK PAIN WITHOUT SCIATICA: ICD-10-CM

## 2017-08-08 NOTE — TELEPHONE ENCOUNTER
NAPROXEN 500 MG TABLET      Last Written Prescription Date: 5/9/17  Last Quantity: 90, # refills: 1  Last Office Visit with [facility] or [facility] prescribing provider: 7/14/17  Next 5 appointments (look out 90 days)     Aug 18, 2017  2:30 PM CDT   Return Visit with ARISTEO Coombs CNP   [facility] [place] ([facility])    [address]  [facility] [address]   [phone]            Sep 20, 2017  1:30 PM CDT   Return Visit with  Oncology Nurse   [facility] ([facility])    [facility]  [address]  [facility] [address]   [phone]            Sep 20, 2017  2:00 PM CDT   Return Visit with Nahum Pa MD   [facility] ([facility])    [facility]  [address]  [facility] [address]   [phone]                   Creatinine   Date Value Ref Range Status   08/03/2017 1.01 0.52 - 1.04 mg/dL Final     Lab Results   Component Value Date    AST 31 08/03/2017     Lab Results   Component Value Date    ALT 31 08/03/2017     BP Readings from Last 3 Encounters:   08/03/17 144/87   07/14/17 104/70   07/10/17 106/73     Sarah Severson, [facility]

## 2017-08-09 ENCOUNTER — HOSPITAL ENCOUNTER (OUTPATIENT)
Dept: CARDIOLOGY | Facility: CLINIC | Age: 72
Discharge: HOME | End: 2017-08-09
Attending: INTERNAL MEDICINE | Admitting: INTERNAL MEDICINE
Payer: MEDICARE

## 2017-08-09 DIAGNOSIS — I47.10 PAROXYSMAL SUPRAVENTRICULAR TACHYCARDIA (H): ICD-10-CM

## 2017-08-09 DIAGNOSIS — R06.09 DYSPNEA ON EXERTION: ICD-10-CM

## 2017-08-09 DIAGNOSIS — I35.0 AORTIC VALVE STENOSIS, UNSPECIFIED ETIOLOGY: ICD-10-CM

## 2017-08-09 PROCEDURE — 93226 XTRNL ECG REC<48 HR SCAN A/R: CPT

## 2017-08-09 PROCEDURE — 93227 XTRNL ECG REC<48 HR R&I: CPT | Performed by: INTERNAL MEDICINE

## 2017-08-09 RX ORDER — NAPROXEN 500 MG/1
TABLET ORAL
Qty: 90 TABLET | Refills: 1 | OUTPATIENT
Start: 2017-08-09

## 2017-08-09 NOTE — TELEPHONE ENCOUNTER
It looked like this was stopped in the setting of LYLY and thrombocytopenia, which have since resolved. But it looks like she has some confusion around her medication list (noted at cardiology visit), recommend she see Dr. Pena prior to refilling and to review med list.   Glo Clayton MD

## 2017-08-09 NOTE — TELEPHONE ENCOUNTER
Routing to PCP to advise upon return.  Samantha Saavedra RN - Triage  [facility]

## 2017-08-09 NOTE — TELEPHONE ENCOUNTER
Routing refill request to provider for review/approval because:  Drug not active on patient's medication list- note says to stopped at discharge.    Essence Rodas RN  [facility]  [phone]

## 2017-08-11 ENCOUNTER — HOSPITAL ENCOUNTER (OUTPATIENT)
Dept: CARDIOLOGY | Facility: CLINIC | Age: 72
Discharge: HOME | End: 2017-08-11
Attending: INTERNAL MEDICINE | Admitting: INTERNAL MEDICINE
Payer: MEDICARE

## 2017-08-11 DIAGNOSIS — I35.0 AORTIC VALVE STENOSIS, UNSPECIFIED ETIOLOGY: ICD-10-CM

## 2017-08-11 PROCEDURE — 93325 DOPPLER ECHO COLOR FLOW MAPG: CPT | Mod: 26 | Performed by: INTERNAL MEDICINE

## 2017-08-11 PROCEDURE — 93308 TTE F-UP OR LMTD: CPT

## 2017-08-11 PROCEDURE — 93308 TTE F-UP OR LMTD: CPT | Mod: 26 | Performed by: INTERNAL MEDICINE

## 2017-08-11 PROCEDURE — 93321 DOPPLER ECHO F-UP/LMTD STD: CPT | Mod: 26 | Performed by: INTERNAL MEDICINE

## 2017-08-14 ENCOUNTER — OFFICE VISIT (OUTPATIENT)
Dept: FAMILY MEDICINE | Facility: CLINIC | Age: 72
End: 2017-08-14
Payer: COMMERCIAL

## 2017-08-14 VITALS
OXYGEN SATURATION: 97 % | DIASTOLIC BLOOD PRESSURE: 70 MMHG | TEMPERATURE: 98.6 F | SYSTOLIC BLOOD PRESSURE: 126 MMHG | HEART RATE: 93 BPM | HEIGHT: 64 IN | BODY MASS INDEX: 29.98 KG/M2 | WEIGHT: 175.6 LBS

## 2017-08-14 DIAGNOSIS — Z02.9 ADMINISTRATIVE ENCOUNTER: Primary | ICD-10-CM

## 2017-08-14 DIAGNOSIS — F32.A DEPRESSION, UNSPECIFIED DEPRESSION TYPE: ICD-10-CM

## 2017-08-14 PROCEDURE — 99214 OFFICE O/P EST MOD 30 MIN: CPT | Performed by: FAMILY MEDICINE

## 2017-08-14 RX ORDER — MIRTAZAPINE 7.5 MG/1
7.5 TABLET, FILM COATED ORAL AT BEDTIME
Qty: 90 TABLET | Refills: 1 | Status: SHIPPED | OUTPATIENT
Start: 2017-08-14 | End: 2018-02-20

## 2017-08-14 ASSESSMENT — PATIENT HEALTH QUESTIONNAIRE - PHQ9
5. POOR APPETITE OR OVEREATING: SEVERAL DAYS
SUM OF ALL RESPONSES TO PHQ QUESTIONS 1-9: 2

## 2017-08-14 ASSESSMENT — ANXIETY QUESTIONNAIRES
GAD7 TOTAL SCORE: 1
6. BECOMING EASILY ANNOYED OR IRRITABLE: NOT AT ALL
2. NOT BEING ABLE TO STOP OR CONTROL WORRYING: NOT AT ALL
IF YOU CHECKED OFF ANY PROBLEMS ON THIS QUESTIONNAIRE, HOW DIFFICULT HAVE THESE PROBLEMS MADE IT FOR YOU TO DO YOUR WORK, TAKE CARE OF THINGS AT HOME, OR GET ALONG WITH OTHER PEOPLE: NOT DIFFICULT AT ALL
3. WORRYING TOO MUCH ABOUT DIFFERENT THINGS: NOT AT ALL
5. BEING SO RESTLESS THAT IT IS HARD TO SIT STILL: NOT AT ALL
7. FEELING AFRAID AS IF SOMETHING AWFUL MIGHT HAPPEN: NOT AT ALL
1. FEELING NERVOUS, ANXIOUS, OR ON EDGE: NOT AT ALL

## 2017-08-14 NOTE — PROGRESS NOTES
SUBJECTIVE:                                                    Kavitha Headley is a 71 year old female who presents to clinic today for the following health issues:      Concern - Needs note for clearance to return to work with limited hours.       Depression Followup    Status since last visit: stable    See PHQ-9 for current symptoms.  Other associated symptoms: None    Complicating factors:   Significant life event:  No   Current substance abuse:  Alcohol. Recently had rehab treatment  Anxiety or Panic symptoms:  no     PHQ-9 SCORE 10/6/2016 12/12/2016 8/14/2017   Total Score - - -   Total Score 4 4 2     NITHIN-7 SCORE 12/12/2016 7/14/2017 8/14/2017   Total Score - - -   Total Score 3 4 1           PHQ-9  English  PHQ-9   Any Language        Problem list and histories reviewed & adjusted, as indicated.  Additional history: as documented    Patient Active Problem List   Diagnosis     Hyperlipidemia LDL goal <130     Spinal stenosis     Chronic insomnia     Alcohol abuse     CKD (chronic kidney disease) stage 3, GFR 30-59 ml/min     Hip joint replacement status     Knee joint replacement status     Chronic pain syndrome     Bariatric surgery status     Personal history of urinary calculi     Macrocytic anemia     Anxiety     Aortic stenosis     Left-sided low back pain without sciatica     ACP (advance care planning)     PAC (premature atrial contraction)     Gastroesophageal reflux disease, esophagitis presence not specified     Controlled substance agreement signed     Seasonal affective disorder (H)     HTN, goal below 140/90     Bilateral lower leg cellulitis     Hypokalemia     Hyponatremia     Cellulitis     Depression, unspecified depression type     Past Surgical History:   Procedure Laterality Date     APPENDECTOMY  3/2004    incidental     C GASTRIC BYPASS,OBESE<100CM ARIANNA-EN-Y  1996     C MEDIASTINOSCOPY W OR WO BIOPSY  2/2008    Videomediastinoscopy and, for mediastinal adenopathy -reactive lymphoid  hyperplasia     C REPAIR OF RECTOCELE  3/2012     C TOTAL KNEE ARTHROPLASTY  12/2005    left      CARPAL TUNNEL RELEASE RT/LT  10/2010    Carpometacarpal excisional arthroplasty with a fascial autograft and APL suspension sling (01341). 2. Left thumb metacarpophalangeal joint fusion with autologous bone graft (75933). 3. Left endoscopic carpal tunnel release      CHOLECYSTECTOMY, LAPOROSCOPIC  11/2010    Cholecystectomy, Laparoscopic     COLONOSCOPY N/A 9/8/2016    Procedure: COMBINED COLONOSCOPY, SINGLE OR MULTIPLE BIOPSY/POLYPECTOMY BY BIOPSY;  Surgeon: Moe Barlow MD;  Location:  GI     CYSTOCELE REPAIR  11/2012    davinci laparoscopic sacrocolpopexy, enterocele repair, lysis of adhesions, placement of retropubic mid urethral sling, cystoscopy     CYSTOSCOPY, LITHOTRIPSY, COMBINED  6/2006    Left extracorporeal shock wave lithotripsy, cystoscopy, left ureteral stent placement.     CYSTOSCOPY, REMOVE STENT(S), COMBINED  7/2006    Cystoscopy, removal of left ureteral stent, retrograde pyelography, flexible and rigid ureteroscopy and holmium laser lithotripsy, basket removal of stone fragments, ureteral stent placement.      ENDOSCOPIC ULTRASOUND UPPER GASTROINTESTINAL TRACT (GI) N/A 6/12/2017    Procedure: ENDOSCOPIC ULTRASOUND, ESOPHAGOSCOPY / UPPER GASTROINTESTINAL TRACT (GI);  ENDOSCOPIC ULTRASOUND, ESOPHAGOSCOPY / UPPER GASTROINTESTINAL TRACT (GI);  Surgeon: Parth Graham MD;  Location:  GI     HERNIA REPAIR  4/2012    bilateral augmentation mastopexy, ventral hernia repair, and medial thigh liposuction on 04/06/2012.      HYSTERECTOMY VAGINAL, BILATERAL SALPINGO-OOPHERECTOMY, COMBINED  1998    due to myoma and bleeding     JOINT REPLACEMENT, HIP RT/LT  4/2004    right total hip arthroplasty     LAPAROTOMY, LYSIS ADHESIONS, COMBINED  3/2004    lysis adhesions, ventral hernia repair, appendectomy incidentally     LYMPH NODE BIOPSY  4/2008    right axillary, reactive follicular and paracortical  hyperplasia.     MAMMOPLASTY AUGMENTATION BILATERAL  4/2012     REVISE RECONSTRUCTED BREAST  6/7/2012    Left breast capsulotomy.        Social History   Substance Use Topics     Smoking status: Never Smoker     Smokeless tobacco: Never Used     Alcohol use No      Comment: none     Family History   Problem Relation Age of Onset     Substance Abuse Father      CANCER Father      throat and lung mets     DIABETES No family hx of      Coronary Artery Disease No family hx of      CEREBROVASCULAR DISEASE No family hx of          Current Outpatient Prescriptions   Medication Sig Dispense Refill     vortioxetine (TRINTELLIX/BRINTELLIX) 5 MG tablet Take 1 tablet (5 mg) by mouth daily 90 tablet 1     mirtazapine (REMERON) 7.5 MG TABS tablet Take 1 tablet (7.5 mg) by mouth At Bedtime 90 tablet 1     Temazepam (RESTORIL PO) Take by mouth At Bedtime       folic acid (FOLVITE) 1 MG tablet Take 1 tablet (1 mg) by mouth daily 30 tablet      thiamine 100 MG tablet Take 1 tablet (100 mg) by mouth daily       sucralfate (CARAFATE) 1 GM/10ML suspension Take 10 mLs (1 g) by mouth 4 times daily (before meals and nightly) 1200 mL      magnesium oxide (MAG-OX) 400 MG tablet Take 1 tablet (400 mg) by mouth daily 7 tablet      gabapentin (NEURONTIN) 300 MG capsule Take 1 capsule (300 mg) by mouth daily 90 capsule      polyethylene glycol (MIRALAX/GLYCOLAX) Packet Take 17 g by mouth daily as needed (constipation) 7 packet      senna-docusate (SENOKOT-S;PERICOLACE) 8.6-50 MG per tablet Take 1-2 tablets by mouth 2 times daily as needed for constipation 100 tablet      valACYclovir (VALTREX) 1000 mg tablet TAKE 2 TABS EVERY 12 HRS FOR 1 DAY ONLY FOR OUTBREAKS OF COLD SORES as needed 4 tablet 3     Multiple Vitamins-Minerals (CENTRUM SILVER) per tablet Take 1 tablet by mouth daily       Allergies   Allergen Reactions     Bactrim [Sulfamethoxazole W/Trimethoprim] Hives     Codeine Itching     NAUSEA     Morphine Itching     NAUSEA  "        Review[facility] and updated as [facility]  Meds       Reviewed and updated as needed this visit by Provider         ROS:  C: NEGATIVE for fever, chills, change in weight  E/M: NEGATIVE for ear, mouth and throat problems  R: NEGATIVE for significant cough or SOB  CV: NEGATIVE for chest pain, palpitations or peripheral edema    OBJECTIVE:                                                    /70  Pulse 93  Temp 98.6  F (37  C) (Tympanic)  Ht 5' 4\" (1.626 m)  Wt 175 lb 9.6 oz (79.7 kg)  SpO2 97%  BMI 30.14 kg/m2  Body mass index is 30.14 kg/(m^2).   GENERAL: healthy, alert, well nourished, well hydrated, no distress  RESP: lungs clear to auscultation - no rales, no rhonchi, no wheezes  CV: regular rates and rhythm, normal S1 S2, no S3 or S4 and no murmur, no click or rub -  ABDOMEN: soft, no tenderness, no  hepatosplenomegaly, no masses, normal bowel sounds  PSYCH: Alert and oriented times 3; speech- coherent , normal rate and volume; able to articulate logical thoughts, able to abstract reason, no tangential thoughts, no hallucinations or delusions, affect- normal       ASSESSMENT/PLAN:                                                      1. Depression, unspecified depression type  Stable. Refill of medications ordered  - vortioxetine (TRINTELLIX/BRINTELLIX) 5 MG tablet; Take 1 tablet (5 mg) by mouth daily  Dispense: 90 tablet; Refill: 1  - mirtazapine (REMERON) 7.5 MG TABS tablet; Take 1 tablet (7.5 mg) by mouth At Bedtime  Dispense: 90 tablet; Refill: 1    2. Administrative encounter  Form to return to work filled. Scanned for our records.      Follow up with Provider - as needed     Alison Pena MD  [facility]  "

## 2017-08-14 NOTE — MR AVS SNAPSHOT
After Visit Summary   8/14/2017    Kavitha Headley    MRN: [id number]           Patient Information     Date Of Birth          1945        Visit Information        Provider Department      8/14/2017 1:40 PM Alison Pena MD [facility] [place]        Today's Diagnoses     Administrative encounter    -  1    Depression, unspecified depression type           Follow-ups after your visit        Your next 10 appointments already scheduled     Sep 20, 2017  1:30 PM CDT   Return Visit with  Oncology Nurse   [facility] ([facility])    [facility]  [address]  [place] [address]   [phone]            Sep 20, 2017  2:00 PM CDT   Return Visit with Nahum Pa MD   [facility] ([facility])    [facility]  [address]  [place] [address]   [phone]              Who to contact     If you have questions or need follow up information about today's clinic visit or your schedule please contact [facility] [place] directly at [phone].  Normal or non-critical lab and imaging results will be communicated to you by MyChart, letter or phone within 4 business days after the clinic has received the results. If you do not hear from us within 7 days, please contact the clinic through [employer] or phone. If you have a critical or abnormal lab result, we will notify you by phone as soon as possible.  Submit refill requests through [employer] or call your pharmacy and they will forward the refill request to us. Please allow 3 business days for your refill to be completed.          Additional Information About Your Visit        MyChart Information     [employer] gives you secure access to your electronic health record. If you see a primary care provider, you can also send messages to your care team and make appointments. If you have questions, please call your primary  "care clinic.  If you do not have a primary care provider, please call [phone] and they will assist you.        Care EveryWhere ID     This is your Care EveryWhere ID. This could be used by other organizations to access your [facility] medical records  [id number]        Your Vitals Were     Pulse Temperature Height Pulse Oximetry BMI (Body Mass Index)       93 98.6  F (37  C) (Tympanic) 5' 4\" (1.626 m) 97% 30.14 kg/m2        Blood Pressure from Last 3 Encounters:   08/14/17 126/70   08/03/17 144/87   07/14/17 104/70    Weight from Last 3 Encounters:   08/14/17 175 lb 9.6 oz (79.7 kg)   08/03/17 176 lb (79.8 kg)   07/14/17 178 lb 12.8 oz (81.1 kg)              We Performed the Following     DEPRESSION ACTION PLAN (DAP)          Today's Medication Changes          These changes are accurate as of: 8/14/17 11:59 PM.  If you have any questions, ask your nurse or doctor.               Stop taking these medicines if you haven't already. Please contact your care team if you have questions.     clonazePAM 0.5 MG tablet   Commonly known as:  klonoPIN   Stopped by:  Alison Pena MD           metoclopramide 5 MG tablet   Commonly known as:  REGLAN   Stopped by:  Alison Pena MD           ranitidine 150 MG tablet   Commonly known as:  ZANTAC   Stopped by:  Alison Pena MD                Where to get your medicines      These medications were sent to [facility]/pharmacy #[id number] - [place] - [address] [facility] [address]     Phone:  [phone]     mirtazapine 7.5 MG Tabs tablet    vortioxetine 5 MG tablet                Primary Care Provider Office Phone # Fax #    Alison Pena [phone]       [address]  [place] [address]        Equal Access to Services     [facility] Center[facility] AH: Hadii юлия Barrios, chintanda luqeron, qaybta kaalmada adeegksy azul. [facility] [phone].    ATENCIÓN: Si sol carreon, " tiene a gaytan disposición servicios gratuitos de asistencia lingüística. Chelsi espinal [phone].    We comply with applicable federal civil rights laws and [place] laws. We do not discriminate on the basis of race, color, national origin, age, disability sex, sexual orientation or gender identity.            Thank you!     Thank you for choosing [facility] [place]  for your care. Our goal is always to provide you with excellent care. Hearing back from our patients is one way we can continue to improve our services. Please take a few minutes to complete the written survey that you may receive in the mail after your visit with us. Thank you!             Your Updated Medication List - Protect others around you: Learn how to safely use, store and throw away your medicines at www.disposemymeds.org.          This list is accurate as of: 8/14/17 11:59 PM.  Always use your most recent med list.                   Brand Name Dispense Instructions for use Diagnosis    CENTRUM SILVER per tablet      Take 1 tablet by mouth daily        folic acid 1 MG tablet    FOLVITE    30 tablet    Take 1 tablet (1 mg) by mouth daily    Alcohol abuse       gabapentin 300 MG capsule    NEURONTIN    90 capsule    Take 1 capsule (300 mg) by mouth daily    Chronic pain syndrome       magnesium oxide 400 MG tablet    MAG-OX    7 tablet    Take 1 tablet (400 mg) by mouth daily    Low magnesium levels       mirtazapine 7.5 MG Tabs tablet    REMERON    90 tablet    Take 1 tablet (7.5 mg) by mouth At Bedtime    Depression, unspecified depression type       polyethylene glycol Packet    MIRALAX/GLYCOLAX    7 packet    Take 17 g by mouth daily as needed (constipation)    Constipation, unspecified constipation type       RESTORIL PO      Take by mouth At Bedtime    Thrombocytopenia (H), Liver disease, chronic, due to alcohol (H)       senna-docusate 8.6-50 MG per tablet    SENOKOT-S;PERICOLACE    100 tablet    Take 1-2 tablets by mouth 2 times  daily as needed for constipation    Constipation, unspecified constipation type       sucralfate 1 GM/10ML suspension    CARAFATE    1200 mL    Take 10 mLs (1 g) by mouth 4 times daily (before meals and nightly)    Esophagitis       thiamine 100 MG tablet      Take 1 tablet (100 mg) by mouth daily    Alcohol abuse       valACYclovir 1000 mg tablet    VALTREX    4 tablet    TAKE 2 TABS EVERY 12 HRS FOR 1 DAY ONLY FOR OUTBREAKS OF COLD SORES as needed    Herpes simplex virus infection       vortioxetine 5 MG tablet    TRINTELLIX/BRINTELLIX    90 tablet    Take 1 tablet (5 mg) by mouth daily    Depression, unspecified depression type

## 2017-08-14 NOTE — LETTER
My Depression Action Plan  Name: Kavitha Headley   Date of Birth 1945  Date: 8/14/2017    My doctor: Alison Pena   My clinic: [address] [address]  [phone]          GREEN    ZONE   Good Control    What it looks like:     Things are going generally well. You have normal up s and down s. You may even feel depressed from time to time, but bad moods usually last less than a day.   What you need to do:  1. Continue to care for yourself (see self care plan)  2. Check your depression survival kit and update it as needed  3. Follow your physician s recommendations including any medication.  4. Do not stop taking medication unless you consult with your physician first.           YELLOW         ZONE Getting Worse    What it looks like:     Depression is starting to interfere with your life.     It may be hard to get out of bed; you may be starting to isolate yourself from others.    Symptoms of depression are starting to last most all day and this has happened for several days.     You may have suicidal thoughts but they are not constant.   What you need to do:     1. Call your care team, your response to treatment will improve if you keep your care team informed of your progress. Yellow periods are signs an adjustment may need to be made.     2. Continue your self-care, even if you have to fake it!    3. Talk to someone in your support network    4. Open up your depression survival kit           RED    ZONE Medical Alert - Get Help    What it looks like:     Depression is seriously interfering with your life.     You may experience these or other symptoms: You can t get out of bed most days, can t work or engage in other necessary activities, you have trouble taking care of basic hygiene, or basic responsibilities, thoughts of suicide or death that will not go away, self-injurious behavior.     What you need to do:  1. Call your care team  and request a same-day appointment. If they are not available (weekends or after hours) call your local crisis line, emergency room or 911.      Electronically signed by: Sarah J. Severson, August 14, 2017    Depression Self Care Plan / Survival Kit    Self-Care for Depression  Here s the deal. Your body and mind are really not as separate as most people think.  What you do and think affects how you feel and how you feel influences what you do and think. This means if you do things that people who feel good do, it will help you feel better.  Sometimes this is all it takes.  There is also a place for medication and therapy depending on how severe your depression is, so be sure to consult with your medical provider and/ or Behavioral Health Consultant if your symptoms are worsening or not improving.     In order to better manage my stress, I will:    Exercise  Get some form of exercise, every day. This will help reduce pain and release endorphins, the  feel good  chemicals in your brain. This is almost as good as taking antidepressants!  This is not the same as joining a gym and then never going! (they count on that by the way ) It can be as simple as just going for a walk or doing some gardening, anything that will get you moving.      Hygiene   Maintain good hygiene (Get out of bed in the morning, Make your bed, Brush your teeth, Take a shower, and Get dressed like you were going to work, even if you are unemployed).  If your clothes don't fit try to get ones that do.    Diet  I will strive to eat foods that are good for me, drink plenty of water, and avoid excessive sugar, caffeine, alcohol, and other mood-altering substances.  Some foods that are helpful in depression are: complex carbohydrates, B vitamins, flaxseed, fish or fish oil, fresh fruits and vegetables.    Psychotherapy  I agree to participate in Individual Therapy (if recommended).    Medication  If prescribed medications, I agree to take them.  Missing  doses can result in serious side effects.  I understand that drinking alcohol, or other illicit drug use, may cause potential side effects.  I will not stop my medication abruptly without first discussing it with my provider.    Staying Connected With Others  I will stay in touch with my friends, family members, and my primary care provider/team.    Use your imagination  Be creative.  We all have a creative side; it doesn t matter if it s oil painting, sand castles, or mud pies! This will also kick up the endorphins.    Witness Beauty  (AKA stop and smell the roses) Take a look outside, even in mid-winter. Notice colors, textures. Watch the squirrels and birds.     Service to others  Be of service to others.  There is always someone else in need.  By helping others we can  get out of ourselves  and remember the really important things.  This also provides opportunities for practicing all the other parts of the program.    Humor  Laugh and be silly!  Adjust your TV habits for less news and crime-drama and more comedy.    Control your stress  Try breathing deep, massage therapy, biofeedback, and meditation. Find time to relax each day.     My support system    Clinic Contact:  Phone number:    Contact 1:  Phone number:    Contact 2:  Phone number:    Ep[employer]/:  Phone number:    Therapist:  Phone number:    Local crisis center:    Phone number:    Other community support:  Phone number:

## 2017-08-14 NOTE — NURSING NOTE
"Chief Complaint   Patient presents with     Form Request     needs to be cleared for work       Initial /70  Pulse 93  Temp 98.6  F (37  C) (Tympanic)  Ht 5' 4\" (1.626 m)  Wt 175 lb 9.6 oz (79.7 kg)  SpO2 97%  BMI 30.14 kg/m2 Estimated body mass index is 30.14 kg/(m^2) as calculated from the following:    Height as of this encounter: 5' 4\" (1.626 m).    Weight as of this encounter: 175 lb 9.6 oz (79.7 kg).  Medication Reconciliation: complete  "

## 2017-08-14 NOTE — TELEPHONE ENCOUNTER
Patient notified and denies requesting refills.  Samantha Saavedra RN - Triage  [facility]

## 2017-08-14 NOTE — TELEPHONE ENCOUNTER
I would not recommend using naproxen as it can damage the kidneys.    Alison Pena MD  [facility] [place]

## 2017-08-15 ASSESSMENT — ANXIETY QUESTIONNAIRES: GAD7 TOTAL SCORE: 1

## 2017-08-21 ENCOUNTER — CARE COORDINATION (OUTPATIENT)
Dept: CARDIOLOGY | Facility: CLINIC | Age: 72
End: 2017-08-21

## 2017-08-21 DIAGNOSIS — K70.9 LIVER DISEASE, CHRONIC, DUE TO ALCOHOL (H): ICD-10-CM

## 2017-08-21 DIAGNOSIS — G89.4 CHRONIC PAIN SYNDROME: ICD-10-CM

## 2017-08-21 DIAGNOSIS — K20.90 ESOPHAGITIS: ICD-10-CM

## 2017-08-21 DIAGNOSIS — D69.6 THROMBOCYTOPENIA (H): ICD-10-CM

## 2017-08-21 DIAGNOSIS — R79.0 LOW MAGNESIUM LEVELS: ICD-10-CM

## 2017-08-21 RX ORDER — VENLAFAXINE 37.5 MG/1
37.5 TABLET ORAL 2 TIMES DAILY
Qty: 60 TABLET | Refills: 1 | COMMUNITY
Start: 2017-08-21 | End: 2018-02-05

## 2017-08-21 RX ORDER — SUCRALFATE ORAL 1 G/10ML
1 SUSPENSION ORAL
Qty: 1200 ML | COMMUNITY
Start: 2017-08-21 | End: 2018-08-28

## 2017-08-21 RX ORDER — SPIRONOLACTONE 100 MG/1
100 TABLET, FILM COATED ORAL DAILY
Refills: 3 | COMMUNITY
Start: 2017-07-23 | End: 2018-08-28

## 2017-08-21 RX ORDER — GABAPENTIN 300 MG/1
300 CAPSULE ORAL 3 TIMES DAILY
Qty: 90 CAPSULE | COMMUNITY
Start: 2017-08-21 | End: 2018-01-02

## 2017-08-21 RX ORDER — LANOLIN ALCOHOL/MO/W.PET/CERES
9 CREAM (GRAM) TOPICAL
COMMUNITY
Start: 2017-08-21 | End: 2019-12-04

## 2017-08-21 RX ORDER — TRAZODONE HYDROCHLORIDE 100 MG/1
100 TABLET ORAL
Qty: 90 TABLET | Refills: 1 | COMMUNITY
Start: 2017-08-21 | End: 2018-02-05

## 2017-08-21 RX ORDER — FUROSEMIDE 20 MG
20 TABLET ORAL PRN
Qty: 30 TABLET | COMMUNITY
Start: 2017-08-21 | End: 2018-02-05

## 2017-08-21 RX ORDER — NAPROXEN 500 MG/1
500 TABLET ORAL 2 TIMES DAILY WITH MEALS
Qty: 60 TABLET | COMMUNITY
Start: 2017-08-21 | End: 2018-02-05

## 2017-08-21 NOTE — PROGRESS NOTES
Called patient to complete medication reconciliation as there were discrepancies at last OV.  Pt states she is currently in her car and is unable to discuss at this time.  Of note, this is the 3rd attempt at contacting patient to complete this medication reconciliation. Pt asked to be called back after 4 pm.

## 2017-08-21 NOTE — PROGRESS NOTES
Call placed to patient again.  Reviewed medication list.  Medication list updated in epic to reflect patient information.  Pt has OV scheduled for 8/24/17.

## 2017-08-24 ENCOUNTER — OFFICE VISIT (OUTPATIENT)
Dept: CARDIOLOGY | Facility: CLINIC | Age: 72
End: 2017-08-24
Attending: INTERNAL MEDICINE
Payer: COMMERCIAL

## 2017-08-24 VITALS
SYSTOLIC BLOOD PRESSURE: 123 MMHG | WEIGHT: 177.6 LBS | HEIGHT: 64 IN | HEART RATE: 99 BPM | DIASTOLIC BLOOD PRESSURE: 80 MMHG | BODY MASS INDEX: 30.32 KG/M2

## 2017-08-24 DIAGNOSIS — I35.0 AORTIC VALVE STENOSIS, UNSPECIFIED ETIOLOGY: ICD-10-CM

## 2017-08-24 PROCEDURE — 99214 OFFICE O/P EST MOD 30 MIN: CPT | Performed by: INTERNAL MEDICINE

## 2017-08-24 NOTE — PROGRESS NOTES
DIAGNOSES:      Encounter Diagnosis   Name Primary?     Aortic valve stenosis, unspecified etiology          HPI:  See [id number]        MEDICATIONS:    Current Outpatient Prescriptions   Medication Sig Dispense Refill     gabapentin (NEURONTIN) 300 MG capsule Take 1 capsule (300 mg) by mouth 3 times daily Pt takes BID 90 capsule      sucralfate (CARAFATE) 1 GM/10ML suspension Take 10 mLs (1 g) by mouth 4 times daily (before meals and nightly) Pt takes once daily 1200 mL      venlafaxine (EFFEXOR) 37.5 MG tablet Take 1 tablet (37.5 mg) by mouth 2 times daily 60 tablet 1     spironolactone (ALDACTONE) 100 MG tablet 100 mg daily  3     melatonin 3 MG tablet Take 2 tablets (6 mg) by mouth nightly as needed for sleep       furosemide (LASIX) 20 MG tablet Take 1 tablet (20 mg) by mouth as needed 30 tablet      naproxen (NAPROSYN) 500 MG tablet Take 1 tablet (500 mg) by mouth 2 times daily (with meals) 60 tablet      traZODone (DESYREL) 100 MG tablet Take 1 tablet (100 mg) by mouth nightly as needed for sleep 90 tablet 1     mirtazapine (REMERON) 7.5 MG TABS tablet Take 1 tablet (7.5 mg) by mouth At Bedtime 90 tablet 1     Temazepam (RESTORIL PO) Take by mouth At Bedtime       folic acid (FOLVITE) 1 MG tablet Take 1 tablet (1 mg) by mouth daily 30 tablet      thiamine 100 MG tablet Take 1 tablet (100 mg) by mouth daily       magnesium oxide (MAG-OX) 400 MG tablet Take 1 tablet (400 mg) by mouth daily 7 tablet      polyethylene glycol (MIRALAX/GLYCOLAX) Packet Take 17 g by mouth daily as needed (constipation) 7 packet      senna-docusate (SENOKOT-S;PERICOLACE) 8.6-50 MG per tablet Take 1-2 tablets by mouth 2 times daily as needed for constipation 100 tablet      valACYclovir (VALTREX) 1000 mg tablet TAKE 2 TABS EVERY 12 HRS FOR 1 DAY ONLY FOR OUTBREAKS OF COLD SORES as needed 4 tablet 3     Multiple Vitamins-Minerals (CENTRUM SILVER) per tablet Take 1 tablet by mouth daily           ALLERGIES     Allergies   Allergen  Reactions     Bactrim [Sulfamethoxazole W/Trimethoprim] Hives     Codeine Itching     NAUSEA     Morphine Itching     NAUSEA       PAST MEDICAL HISTORY:  Past Medical History:   Diagnosis Date     Alcohol abuse     long term alcohol abuse     Anxiety disorder      Bariatric surgery status 1996?    gastric bypass, [facility] and     Benign hypertension      Chronic insomnia      Chronic low back pain      Chronic pain syndrome     Chronic back and neck pain, chronic pain due to osteoarthritis multiple joints     Coronary artery disease 9/2015    mild distal branch disease on cath     Disc disease, degenerative, cervical     C4-C7 disc disease     Diverticulitis     inpatient therapy 6/2006     Hip joint replacement status 4/2004    right     Knee joint replacement status 12/2005    left     Macrocytic anemia     Mild macrocytic anemia, 2012 to present, likely based on alcohol abuse.     Major depressive disorder, single episode, severe, without mention of psychotic behavior      Mixed hyperlipidemia      Moderate aortic stenosis 5/2014    mild/mod AS with peak gradient 33/mean gradient 20 mmHg, AV area 1.2     Pelvic relaxation disorder     Surgical intervention for cystocele/rectocele 3,11/2012     Personal history of urinary calculi 6/2006    left ureteral stone,lithotripsy     PVC (premature ventricular contraction)      Stage III chronic kidney disease 2005     SVT (supraventricular tachycardia) (H)     likely atrial tachycardia       PAST SURGICAL HISTORY:  Past Surgical History:   Procedure Laterality Date     APPENDECTOMY  3/2004    incidental     C GASTRIC BYPASS,OBESE<100CM ARIANNA-EN-Y  1996     C MEDIASTINOSCOPY W OR WO BIOPSY  2/2008    Videomediastinoscopy and, for mediastinal adenopathy -reactive lymphoid hyperplasia     C REPAIR OF RECTOCELE  3/2012     C TOTAL KNEE ARTHROPLASTY  12/2005    left      CARPAL TUNNEL RELEASE RT/LT  10/2010    Carpometacarpal excisional arthroplasty with a fascial autograft  and APL suspension sling (44359). 2. Left thumb metacarpophalangeal joint fusion with autologous bone graft (12519). 3. Left endoscopic carpal tunnel release      CHOLECYSTECTOMY, LAPOROSCOPIC  11/2010    Cholecystectomy, Laparoscopic     COLONOSCOPY N/A 9/8/2016    Procedure: COMBINED COLONOSCOPY, SINGLE OR MULTIPLE BIOPSY/POLYPECTOMY BY BIOPSY;  Surgeon: Moe Barlow MD;  Location:  GI     CYSTOCELE REPAIR  11/2012    davinci laparoscopic sacrocolpopexy, enterocele repair, lysis of adhesions, placement of retropubic mid urethral sling, cystoscopy     CYSTOSCOPY, LITHOTRIPSY, COMBINED  6/2006    Left extracorporeal shock wave lithotripsy, cystoscopy, left ureteral stent placement.     CYSTOSCOPY, REMOVE STENT(S), COMBINED  7/2006    Cystoscopy, removal of left ureteral stent, retrograde pyelography, flexible and rigid ureteroscopy and holmium laser lithotripsy, basket removal of stone fragments, ureteral stent placement.      ENDOSCOPIC ULTRASOUND UPPER GASTROINTESTINAL TRACT (GI) N/A 6/12/2017    Procedure: ENDOSCOPIC ULTRASOUND, ESOPHAGOSCOPY / UPPER GASTROINTESTINAL TRACT (GI);  ENDOSCOPIC ULTRASOUND, ESOPHAGOSCOPY / UPPER GASTROINTESTINAL TRACT (GI);  Surgeon: Parth Graham MD;  Location:  GI     HERNIA REPAIR  4/2012    bilateral augmentation mastopexy, ventral hernia repair, and medial thigh liposuction on 04/06/2012.      HYSTERECTOMY VAGINAL, BILATERAL SALPINGO-OOPHERECTOMY, COMBINED  1998    due to myoma and bleeding     JOINT REPLACEMENT, HIP RT/LT  4/2004    right total hip arthroplasty     LAPAROTOMY, LYSIS ADHESIONS, COMBINED  3/2004    lysis adhesions, ventral hernia repair, appendectomy incidentally     LYMPH NODE BIOPSY  4/2008    right axillary, reactive follicular and paracortical hyperplasia.     MAMMOPLASTY AUGMENTATION BILATERAL  4/2012     REVISE RECONSTRUCTED BREAST  6/7/2012    Left breast capsulotomy.        FAMILY HISTORY:  Family History   Problem Relation Age of Onset      "Substance Abuse Father      CANCER Father      throat and lung mets     DIABETES No family hx of      Coronary Artery Disease No family hx of      CEREBROVASCULAR DISEASE No family hx of        SOCIAL HISTORY:  Social History     Social History     Marital status:      Spouse name: Mt     Number of children: 4     Years of education: 18     Occupational History     nurse       [employer]     Social History Main Topics     Smoking status: Never Smoker     Smokeless tobacco: Never Used     Alcohol use No      Comment: none     Drug use: No     Sexual activity: Yes     Partners: Male     Other Topics Concern     Blood Transfusions No     Caffeine Concern Yes     1-2 cups per day      Occupational Exposure Yes     blood     Hobby Hazards No     Sleep Concern Yes     Stress Concern Yes     Weight Concern Yes     gastric  byepass     Special Diet No     Back Care No     Exercise Yes     walk, swin     Bike Helmet No     Seat Belt Yes     Self-Exams Yes     Social History Narrative       Review of Systems:  Skin:  Negative     Eyes:  Positive for glasses (readers only)  ENT:  Negative    Respiratory:  Negative    Cardiovascular:    Positive for;palpitations;edema  Gastroenterology: Negative    Genitourinary:  Negative    Musculoskeletal:  Positive for arthritis  Neurologic:  Negative    Psychiatric:  Positive for sleep disturbances  Heme/Lymph/Imm:  Positive for    Endocrine:  Negative      Physical Exam:  Vitals: /80  Pulse 99  Ht 1.626 m (5' 4\")  Wt 80.6 kg (177 lb 9.6 oz)  BMI 30.48 kg/m2    Constitutional:  cooperative, alert and oriented, well developed, well nourished, in no acute distress        Skin:  warm and dry to the touch, no apparent skin lesions or masses noted venous stasis changes      Head:  normocephalic, no masses or lesions        Eyes:  pupils equal and round, conjunctivae and lids unremarkable, sclera white, no xanthalasma, EOMS intact, no nystagmus        ENT:  no pallor or cyanosis, " dentition good        Neck:  JVP normal        Chest:  no intercostal retraction;normal respiratory excursion;no use of accessory muscles        Cardiac:               S1, S2 regular with 2-3/5 mid-peaking ESM RUSB radiation to apex and throughout precordium. No rub nor clear gallop. No RV lift. PMI discrete, nondisplaced    Abdomen:  not assessed this visit        Vascular: not assessed this visit                                        Extremities and Back:  no deformities, clubbing, cyanosis, erythema observed erythema;stasis pigmentation       Swelling of the lower extremities, increased in left leg; calf tenderness; varicose veins; erythema, induration    Neurological:  affect appropriate, oriented to time, person and place;no gross motor deficits          ASSESSMENT AND PLAN:    See dictation    ORDERS AT TODAY'S VISIT:    Orders Placed This Encounter   Procedures     Follow-Up with Cardiac Advanced Practice Provider     Echocardiogram Complete           CC  Nayeli Bartholomew MD  [address]  [place] [address]

## 2017-08-24 NOTE — PROGRESS NOTES
REASON FOR VISIT:  Followup visit.      HISTORY OF PRESENT ILLNESS:  Ms. Headley is a pleasant, 71-year-old lady who comes in routine followup.  She has a history of atypical chest discomfort, moderate aortic stenosis and mild aortic root dilatation with brief asymptomatic runs of SVT.      She underwent angiography with only scattered nonobstructive disease.  Most recent echo describes severe aortic stenosis by valve area, moderate by gradient and by dimension with index.      Ms. Headley was also anemic at the time of our last visit and felt that her exertional dyspnea had worsened over the prior 6-8 months.  She has a recent significant alcohol abuse history.      On her interview today, however, Ms. Headley states that her breathing is good.  She is limited only if she does very heavy activity, such as heavy housework, after about 15 minutes.  She states she needs to rest in part related to shortness of breath, but also likely related to arthritic pain.  She has had no chest pain or other cardiorespiratory symptoms, such as palpitations, lightheadedness, presyncope, syncope, PND or orthopnea.  No change in her pedal edema.      ASSESSMENT AND PLAN:  A 71-year-old lady with moderate aortic stenosis verified on repeat limited echocardiogram and mild mitral stenosis.  She has very mild nonobstructive coronary artery disease on cardiac catheterization in 2015.      She was intolerant to metoprolol with hypotension with a trivial dose, but appears to be doing well.      We will return her to the care of her Primary physician at this time and be happy to see her back with an echo in a year in followup of her aortic valve disease.      I have also cautioned the patient against hot tubs, saunas or any other activities that could cause prolonged and excessive vasodilatation that could be expected to drop her blood pressure.  She has verbalized understanding and also the mechanism of concern and agrees.      Total time is 30  minutes, 25 in coordination of care and counseling.      cc:   [place] [address]         GEN BURT MD             D: 2017 10:32   T: 2017 11:17   MT: дмитрий      Name:     DARWIN JASSO   MRN:      [id number]        Account:      [id number]   :      1945           Service Date: 2017      Document: [id number]

## 2017-08-24 NOTE — MR AVS SNAPSHOT
After Visit Summary   8/24/2017    Kavitha Headley    MRN: [id number]           Patient Information     Date Of Birth          1945        Visit Information        Provider Department      8/24/2017 8:45 AM Nayeli Bartholomew MD [facility] [place]        Today's Diagnoses     Aortic valve stenosis, unspecified etiology           Follow-ups after your visit        Additional Services     Follow-Up with Cardiac Advanced Practice Provider                 Your next 10 appointments already scheduled     Sep 20, 2017  1:30 PM CDT   Return Visit with  Oncology Nurse   [facility]    [facility]  [address]  [place] [address]   [phone]            Sep 20, 2017  2:00 PM CDT   Return Visit with Nahum Pa MD   [facility]    [facility] [place]  [address]  [place] [address]   [phone]              Future tests that were ordered for you today     Open Future Orders        Priority Expected Expires Ordered    Follow-Up with Cardiac Advanced Practice Provider Routine 9/7/2017 8/24/2019 8/24/2017    Echocardiogram Complete Routine 8/24/2018 8/24/2018 8/24/2017            Who to contact     If you have questions or need follow up information about today's clinic visit or your schedule please contact [facility] [place] directly at [phone].  Normal or non-critical lab and imaging results will be communicated to you by MyChart, letter or phone within 4 business days after the clinic has received the results. If you do not hear from us within 7 days, please contact the clinic through MyChart or phone. If you have a critical or abnormal lab result, we will notify you by phone as soon as possible.  Submit refill requests through [employer] or call your pharmacy and they will  "forward the refill request to us. Please allow 3 business days for your refill to be completed.          Additional Information About Your Visit        [employer] Information     [employer] gives you secure access to your electronic health record. If you see a primary care provider, you can also send messages to your care team and make appointments. If you have questions, please call your primary care clinic.  If you do not have a primary care provider, please call [phone] and they will assist you.        Care EveryWhere ID     This is your Care EveryWhere ID. This could be used by other organizations to access your [place] medical records  [id number]        Your Vitals Were     Pulse Height BMI (Body Mass Index)             99 1.626 m (5' 4\") 30.48 kg/m2          Blood Pressure from Last 3 Encounters:   08/24/17 123/80   08/14/17 126/70   08/03/17 144/87    Weight from Last 3 Encounters:   08/24/17 80.6 kg (177 lb 9.6 oz)   08/14/17 79.7 kg (175 lb 9.6 oz)   08/03/17 79.8 kg (176 lb)              We Performed the Following     Follow-Up with Cardiologist        Primary Care Provider Office Phone # Fax #    Alison Pena [phone]       [address] [place] [facility] [place] [address]        Equal Access to Services     [facility]: Hadii aad ku hadasho Soomaali, waaxda luqadaha, qaybta kaalmada adeegyada, sky king. So [facility] [phone].    ATENCIÓN: Si habla español, tiene a gaytan disposición servicios gratuitos de asistencia lingüística. Llame al [phone].    We comply with applicable federal civil rights laws and [place] laws. We do not discriminate on the basis of race, color, national origin, age, disability sex, sexual orientation or gender identity.            Thank you!     Thank you for choosing [facility] [place]  for your care. Our goal is always to provide you with excellent care. Hearing back from our patients is one " way we can continue to improve our services. Please take a few minutes to complete the written survey that you may receive in the mail after your visit with us. Thank you!             Your Updated Medication List - Protect others around you: Learn how to safely use, store and throw away your medicines at www.disposemymeds.org.          This list is accurate as of: 8/24/17 10:34 AM.  Always use your most recent med list.                   Brand Name Dispense Instructions for use Diagnosis    CENTRUM SILVER per tablet      Take 1 tablet by mouth daily        folic acid 1 MG tablet    FOLVITE    30 tablet    Take 1 tablet (1 mg) by mouth daily    Alcohol abuse       furosemide 20 MG tablet    LASIX    30 tablet    Take 1 tablet (20 mg) by mouth as needed        gabapentin 300 MG capsule    NEURONTIN    90 capsule    Take 1 capsule (300 mg) by mouth 3 times daily Pt takes BID    Chronic pain syndrome       magnesium oxide 400 MG tablet    MAG-OX    7 tablet    Take 1 tablet (400 mg) by mouth daily    Low magnesium levels       melatonin 3 MG tablet      Take 2 tablets (6 mg) by mouth nightly as needed for sleep        mirtazapine 7.5 MG Tabs tablet    REMERON    90 tablet    Take 1 tablet (7.5 mg) by mouth At Bedtime    Depression, unspecified depression type       naproxen 500 MG tablet    NAPROSYN    60 tablet    Take 1 tablet (500 mg) by mouth 2 times daily (with meals)        polyethylene glycol Packet    MIRALAX/GLYCOLAX    7 packet    Take 17 g by mouth daily as needed (constipation)    Constipation, unspecified constipation type       RESTORIL PO      Take by mouth At Bedtime    Thrombocytopenia (H), Liver disease, chronic, due to alcohol (H)       senna-docusate 8.6-50 MG per tablet    SENOKOT-S;PERICOLACE    100 tablet    Take 1-2 tablets by mouth 2 times daily as needed for constipation    Constipation, unspecified constipation type       spironolactone 100 MG tablet    ALDACTONE     100 mg daily         sucralfate 1 GM/10ML suspension    CARAFATE    1200 mL    Take 10 mLs (1 g) by mouth 4 times daily (before meals and nightly) Pt takes once daily    Esophagitis       thiamine 100 MG tablet      Take 1 tablet (100 mg) by mouth daily    Alcohol abuse       traZODone 100 MG tablet    DESYREL    90 tablet    Take 1 tablet (100 mg) by mouth nightly as needed for sleep        valACYclovir 1000 mg tablet    VALTREX    4 tablet    TAKE 2 TABS EVERY 12 HRS FOR 1 DAY ONLY FOR OUTBREAKS OF COLD SORES as needed    Herpes simplex virus infection       venlafaxine 37.5 MG tablet    EFFEXOR    60 tablet    Take 1 tablet (37.5 mg) by mouth 2 times daily

## 2017-08-24 NOTE — LETTER
8/24/2017    Alison Pena MD  [address]  [place] [address]    RE: Kavitha Phamyder       Dear Colleague,    I had the pleasure of seeing Kavitha Headley in the [facility].    REASON FOR VISIT:  Followup visit.      Ms. Headley is a pleasant, 71-year-old lady who comes in routine followup.  She has a history of atypical chest discomfort, moderate aortic stenosis and mild aortic root dilatation with brief asymptomatic runs of SVT.      She underwent angiography with only scattered nonobstructive disease.  Most recent echo describes severe aortic stenosis by valve area, moderate by gradient and by dimension with index.      Ms. Headley was also anemic at the time of our last visit and felt that her exertional dyspnea had worsened over the prior 6-8 months.  She has a recent significant alcohol abuse history.      On her interview today, however, Ms. Headley states that her breathing is good.  She is limited only if she does very heavy activity, such as heavy housework, after about 15 minutes.  She states she needs to rest in part related to shortness of breath, but also likely related to arthritic pain.  She has had no chest pain or other cardiorespiratory symptoms, such as palpitations, lightheadedness, presyncope, syncope, PND or orthopnea.  No change in her pedal edema.     Outpatient Encounter Prescriptions as of 8/24/2017   Medication Sig Dispense Refill     gabapentin (NEURONTIN) 300 MG capsule Take 1 capsule (300 mg) by mouth 3 times daily Pt takes BID 90 capsule      sucralfate (CARAFATE) 1 GM/10ML suspension Take 10 mLs (1 g) by mouth 4 times daily (before meals and nightly) Pt takes once daily 1200 mL      venlafaxine (EFFEXOR) 37.5 MG tablet Take 1 tablet (37.5 mg) by mouth 2 times daily 60 tablet 1     spironolactone (ALDACTONE) 100 MG tablet 100 mg daily  3     melatonin 3 MG tablet Take 2 tablets (6 mg) by mouth nightly as needed for sleep       furosemide  (LASIX) 20 MG tablet Take 1 tablet (20 mg) by mouth as needed 30 tablet      naproxen (NAPROSYN) 500 MG tablet Take 1 tablet (500 mg) by mouth 2 times daily (with meals) 60 tablet      traZODone (DESYREL) 100 MG tablet Take 1 tablet (100 mg) by mouth nightly as needed for sleep 90 tablet 1     mirtazapine (REMERON) 7.5 MG TABS tablet Take 1 tablet (7.5 mg) by mouth At Bedtime 90 tablet 1     Temazepam (RESTORIL PO) Take by mouth At Bedtime       folic acid (FOLVITE) 1 MG tablet Take 1 tablet (1 mg) by mouth daily 30 tablet      thiamine 100 MG tablet Take 1 tablet (100 mg) by mouth daily       magnesium oxide (MAG-OX) 400 MG tablet Take 1 tablet (400 mg) by mouth daily 7 tablet      polyethylene glycol (MIRALAX/GLYCOLAX) Packet Take 17 g by mouth daily as needed (constipation) 7 packet      senna-docusate (SENOKOT-S;PERICOLACE) 8.6-50 MG per tablet Take 1-2 tablets by mouth 2 times daily as needed for constipation 100 tablet      valACYclovir (VALTREX) 1000 mg tablet TAKE 2 TABS EVERY 12 HRS FOR 1 DAY ONLY FOR OUTBREAKS OF COLD SORES as needed 4 tablet 3     Multiple Vitamins-Minerals (CENTRUM SILVER) per tablet Take 1 tablet by mouth daily       No facility-administered encounter medications on file as of 8/24/2017.       ASSESSMENT AND PLAN:  A 71-year-old lady with moderate aortic stenosis verified on repeat limited echocardiogram and mild mitral stenosis.  She has very mild nonobstructive coronary artery disease on cardiac catheterization in 2015.      She was intolerant to metoprolol with hypotension with a trivial dose, but appears to be doing well.      We will return her to the care of her Primary physician at this time and be happy to see her back with an echo in a year in followup of her aortic valve disease.      I have also cautioned the patient against hot tubs, saunas or any other activities that could cause prolonged and excessive vasodilatation that could be expected to drop her blood pressure.  She  has verbalized understanding and also the mechanism of concern and agrees.      Total time is 30 minutes, 25 in coordination of care and counseling.        Again, thank you for allowing me to participate in the care of your patient.      Sincerely,    Nayeli Bartholomew MD     [facility]

## 2017-08-25 ENCOUNTER — COMMUNICATION - HEALTHEAST (OUTPATIENT)
Dept: BEHAVIORAL HEALTH | Facility: CLINIC | Age: 72
End: 2017-08-25

## 2017-08-27 DIAGNOSIS — G89.29 CHRONIC LEFT-SIDED LOW BACK PAIN WITHOUT SCIATICA: ICD-10-CM

## 2017-08-27 DIAGNOSIS — M54.50 CHRONIC LEFT-SIDED LOW BACK PAIN WITHOUT SCIATICA: ICD-10-CM

## 2017-08-28 NOTE — TELEPHONE ENCOUNTER
Naproxen       Last Written Prescription Date: 8/21/17  Last Quantity: 60, # refills: 0  Last Office Visit with [facility], [facility] or [facility] prescribing provider: 8/14/17  Next 5 appointments (look out 90 days)     Sep 20, 2017  1:30 PM CDT   Return Visit with  Oncology Nurse   [facility]    [facility]  [address]  [facility] [address]   [phone]            Sep 20, 2017  2:00 PM CDT   Return Visit with Nahum Pa MD   [facility]    [facility]  [address]  [facility] [address]   [phone]                   Creatinine   Date Value Ref Range Status   08/03/2017 1.01 0.52 - 1.04 mg/dL Final     Lab Results   Component Value Date    AST 31 08/03/2017     Lab Results   Component Value Date    ALT 31 08/03/2017     BP Readings from Last 3 Encounters:   08/24/17 123/80   08/14/17 126/70   08/03/17 144/87

## 2017-09-06 RX ORDER — NAPROXEN 500 MG/1
TABLET ORAL
Qty: 90 TABLET | Refills: 1 | OUTPATIENT
Start: 2017-09-06

## 2017-09-06 NOTE — TELEPHONE ENCOUNTER
Per chart review it appears this medication was stopped due to LYLY. Medication denied.   Mini Calero RN   [facility] - Triage

## 2017-09-20 ENCOUNTER — HOSPITAL ENCOUNTER (OUTPATIENT)
Facility: CLINIC | Age: 72
Setting detail: SPECIMEN
Discharge: HOME | End: 2017-09-20
Attending: INTERNAL MEDICINE | Admitting: INTERNAL MEDICINE
Payer: MEDICARE

## 2017-09-20 ENCOUNTER — ONCOLOGY VISIT (OUTPATIENT)
Dept: ONCOLOGY | Facility: CLINIC | Age: 72
End: 2017-09-20
Attending: INTERNAL MEDICINE
Payer: COMMERCIAL

## 2017-09-20 ENCOUNTER — ONCOLOGY VISIT (OUTPATIENT)
Dept: ONCOLOGY | Facility: CLINIC | Age: 72
End: 2017-09-20
Attending: INTERNAL MEDICINE
Payer: MEDICARE

## 2017-09-20 VITALS
TEMPERATURE: 99 F | WEIGHT: 181 LBS | DIASTOLIC BLOOD PRESSURE: 83 MMHG | SYSTOLIC BLOOD PRESSURE: 130 MMHG | OXYGEN SATURATION: 95 % | BODY MASS INDEX: 31.07 KG/M2 | RESPIRATION RATE: 20 BRPM | HEART RATE: 86 BPM

## 2017-09-20 DIAGNOSIS — D69.6 THROMBOCYTOPENIA (H): ICD-10-CM

## 2017-09-20 DIAGNOSIS — K76.0 FATTY LIVER: ICD-10-CM

## 2017-09-20 DIAGNOSIS — R59.0 INGUINAL ADENOPATHY: Primary | ICD-10-CM

## 2017-09-20 LAB
BASOPHILS # BLD AUTO: 0 10E9/L (ref 0–0.2)
BASOPHILS NFR BLD AUTO: 0 %
DIFFERENTIAL METHOD BLD: ABNORMAL
EOSINOPHIL # BLD AUTO: 0.2 10E9/L (ref 0–0.7)
EOSINOPHIL NFR BLD AUTO: 3 %
ERYTHROCYTE [DISTWIDTH] IN BLOOD BY AUTOMATED COUNT: 13.7 % (ref 10–15)
HCT VFR BLD AUTO: 33.4 % (ref 35–47)
HGB BLD-MCNC: 11.4 G/DL (ref 11.7–15.7)
LYMPHOCYTES # BLD AUTO: 1.4 10E9/L (ref 0.8–5.3)
LYMPHOCYTES NFR BLD AUTO: 18 %
MCH RBC QN AUTO: 32 PG (ref 26.5–33)
MCHC RBC AUTO-ENTMCNC: 34.1 G/DL (ref 31.5–36.5)
MCV RBC AUTO: 94 FL (ref 78–100)
MONOCYTES # BLD AUTO: 0.6 10E9/L (ref 0–1.3)
MONOCYTES NFR BLD AUTO: 8 %
NEUTROPHILS # BLD AUTO: 5.5 10E9/L (ref 1.6–8.3)
NEUTROPHILS NFR BLD AUTO: 71 %
PLATELET # BLD AUTO: 398 10E9/L (ref 150–450)
PLATELET # BLD EST: ABNORMAL 10*3/UL
RBC # BLD AUTO: 3.56 10E12/L (ref 3.8–5.2)
RBC MORPH BLD: ABNORMAL
WBC # BLD AUTO: 7.8 10E9/L (ref 4–11)

## 2017-09-20 PROCEDURE — 99214 OFFICE O/P EST MOD 30 MIN: CPT | Performed by: INTERNAL MEDICINE

## 2017-09-20 PROCEDURE — 99211 OFF/OP EST MAY X REQ PHY/QHP: CPT

## 2017-09-20 PROCEDURE — 85025 COMPLETE CBC W/AUTO DIFF WBC: CPT | Performed by: INTERNAL MEDICINE

## 2017-09-20 PROCEDURE — 36415 COLL VENOUS BLD VENIPUNCTURE: CPT

## 2017-09-20 ASSESSMENT — PAIN SCALES - GENERAL: PAINLEVEL: NO PAIN (0)

## 2017-09-20 NOTE — PATIENT INSTRUCTIONS
CT abdomen and pelvis.  Scheduled/obdulia  Follow up after CT.  Scheduled/Obdulia      AVS printed & given to patient/Obdulia

## 2017-09-20 NOTE — PROGRESS NOTES
"Oncology Rooming Note    September 20, 2017 1:26 PM   Kavitha Headley is a 71 year old female who presents for:    No chief complaint on file.    Initial Vitals: There were no vitals taken for this visit. Estimated body mass index is 30.48 kg/(m^2) as calculated from the following:    Height as of 8/24/17: 1.626 m (5' 4\").    Weight as of 8/24/17: 80.6 kg (177 lb 9.6 oz). There is no height or weight on file to calculate BSA.  Data Unavailable Comment: Data Unavailable   No LMP recorded. Patient has had a hysterectomy.  Allergies reviewed: Yes  Medications reviewed: Yes    Medications: Medication refills not needed today.  Pharmacy name entered into EPIC:    ROMERO MATHIS  [facility]/PHARMACY #[id number] - [place] - [address]. AT [address] [place] - [address]    Clinical concerns: none     4 minutes for nursing intake (face to face time)     Zakiya Vidal CMA              DISCHARGE PLAN:  Next appointments: See patient instruction section  Patient given discharge instructions and brought to scheduling to schedule CT scan and follow-up appointment.  Departure Mode: Ambulatory  Accompanied by: self  4 minutes for nursing discharge (face to face time)   Zakiya Vidal CMA      "

## 2017-09-20 NOTE — MR AVS SNAPSHOT
After Visit Summary   9/20/2017    Kavitha Headley    MRN: [id number]           Patient Information     Date Of Birth          1945        Visit Information        Provider Department      9/20/2017 1:30 PM Nurse, [facility]        Today's Diagnoses     Thrombocytopenia (H)           Follow-ups after your visit        Your next 10 appointments already scheduled     Sep 20, 2017  2:00 PM CDT   Return Visit with Nahum Pa MD   [facility] ([facility])    [facility]  [address]  [facility] [id number] [phone]              Who to contact     If you have questions or need follow up information about today's clinic visit or your schedule please contact [facility] directly at [phone].  Normal or non-critical lab and imaging results will be communicated to you by PetCoachhart, letter or phone within 4 business days after the clinic has received the results. If you do not hear from us within 7 days, please contact the clinic through PetCoachhart or phone. If you have a critical or abnormal lab result, we will notify you by phone as soon as possible.  Submit refill requests through Sojern or call your pharmacy and they will forward the refill request to us. Please allow 3 business days for your refill to be completed.          Additional Information About Your Visit        MyChart Information     Sojern gives you secure access to your electronic health record. If you see a primary care provider, you can also send messages to your care team and make appointments. If you have questions, please call your primary care clinic.  If you do not have a primary care provider, please call [phone] and they will assist you.        Care EveryWhere ID     This is your Care EveryWhere ID. This could be used by other organizations to access your [place] medical records  [id number]         Blood Pressure from  Last 3 Encounters:   08/24/17 123/80   08/14/17 126/70   08/03/17 144/87    Weight from Last 3 Encounters:   08/24/17 80.6 kg (177 lb 9.6 oz)   08/14/17 79.7 kg (175 lb 9.6 oz)   08/03/17 79.8 kg (176 lb)              We Performed the Following     CBC with platelets differential        Primary Care Provider Office Phone # Fax #    Alison Pena [phone]       [address]  [place] [address]        Equal Access to Services     [facility]: Hadii юлия christian hadtip Soaustyn, waiveth farerll, bonnie huerta, sky vides . So [facility] [phone].    ATENCIÓN: Si habla español, tiene a gaytan disposición servicios gratuitos de asistencia lingüística. [facility] [phone].    We comply with applicable federal civil rights laws and [place] laws. We do not discriminate on the basis of race, color, national origin, age, disability sex, sexual orientation or gender identity.            Thank you!     Thank you for choosing [facility]  for your care. Our goal is always to provide you with excellent care. Hearing back from our patients is one way we can continue to improve our services. Please take a few minutes to complete the written survey that you may receive in the mail after your visit with us. Thank you!             Your Updated Medication List - Protect others around you: Learn how to safely use, store and throw away your medicines at www.disposemymeds.org.          This list is accurate as of: 9/20/17  1:53 PM.  Always use your most recent med list.                   Brand Name Dispense Instructions for use Diagnosis    CENTRUM SILVER per tablet      Take 1 tablet by mouth daily        folic acid 1 MG tablet    FOLVITE    30 tablet    Take 1 tablet (1 mg) by mouth daily    Alcohol abuse       furosemide 20 MG tablet    LASIX    30 tablet    Take 1 tablet (20 mg) by mouth as needed        gabapentin 300 MG capsule    NEURONTIN    90 capsule     Take 1 capsule (300 mg) by mouth 3 times daily Pt takes BID    Chronic pain syndrome       magnesium oxide 400 MG tablet    MAG-OX    7 tablet    Take 1 tablet (400 mg) by mouth daily    Low magnesium levels       melatonin 3 MG tablet      Take 2 tablets (6 mg) by mouth nightly as needed for sleep        mirtazapine 7.5 MG Tabs tablet    REMERON    90 tablet    Take 1 tablet (7.5 mg) by mouth At Bedtime    Depression, unspecified depression type       naproxen 500 MG tablet    NAPROSYN    60 tablet    Take 1 tablet (500 mg) by mouth 2 times daily (with meals)        polyethylene glycol Packet    MIRALAX/GLYCOLAX    7 packet    Take 17 g by mouth daily as needed (constipation)    Constipation, unspecified constipation type       RESTORIL PO      Take by mouth At Bedtime    Thrombocytopenia (H), Liver disease, chronic, due to alcohol (H)       senna-docusate 8.6-50 MG per tablet    SENOKOT-S;PERICOLACE    100 tablet    Take 1-2 tablets by mouth 2 times daily as needed for constipation    Constipation, unspecified constipation type       spironolactone 100 MG tablet    ALDACTONE     100 mg daily        sucralfate 1 GM/10ML suspension    CARAFATE    1200 mL    Take 10 mLs (1 g) by mouth 4 times daily (before meals and nightly) Pt takes once daily    Esophagitis       thiamine 100 MG tablet      Take 1 tablet (100 mg) by mouth daily    Alcohol abuse       traZODone 100 MG tablet    DESYREL    90 tablet    Take 1 tablet (100 mg) by mouth nightly as needed for sleep        valACYclovir 1000 mg tablet    VALTREX    4 tablet    TAKE 2 TABS EVERY 12 HRS FOR 1 DAY ONLY FOR OUTBREAKS OF COLD SORES as needed    Herpes simplex virus infection       venlafaxine 37.5 MG tablet    EFFEXOR    60 tablet    Take 1 tablet (37.5 mg) by mouth 2 times daily

## 2017-09-20 NOTE — PROGRESS NOTES
Medical Assistant Note:  Kavitha Headley presents today for labs.    Patient seen by provider today: Yes: Thierno.   present during visit today: Not Applicable.    Concerns: No Concerns.    Procedure:  Lab draw site: LAC, Needle type: BF, Gauge: 21.    Post Assessment:  Labs drawn without difficulty: Yes.    Discharge Plan:  Pt tolerated procedure well. Gauze and coban applied.  Face to Face Time: 8min.    Shahida Banegas MA

## 2017-09-20 NOTE — MR AVS SNAPSHOT
After Visit Summary   9/20/2017    Kavitha Headley    MRN: [id number]           Patient Information     Date Of Birth          1945        Visit Information        Provider Department      9/20/2017 2:00 PM Nahum Pa MD [facility]        Today's Diagnoses     Inguinal adenopathy    -  1    Fatty liver          Care Instructions    CT abdomen and pelvis.  Follow up after CT.          Follow-ups after your visit        Your next 10 appointments already scheduled     Sep 22, 2017  2:30 PM CDT   CT ABDOMEN PELVIS W CONTRAST with SHCT1   [facility] ([facility])    [address] [facility] [address]   [phone]           Please bring any scans or X-rays taken at other hospitals, if similar tests were done. Also bring a list of your medicines, including vitamins, minerals and over-the-counter drugs. It is safest to leave personal items at home.  Be sure to tell your doctor:   If you have any allergies.   If there s any chance you are pregnant.   If you are breastfeeding.   If you have any special needs.  You may have contrast for this exam. To prepare:   Do not eat or drink for 2 hours before your exam. If you need to take medicine, you may take it with small sips of water. (We may ask you to take liquid medicine as well.)   The day before your exam, drink extra fluids at least six 8-ounce glasses (unless your doctor tells you to restrict your fluids).  Patients over 70 or patients with diabetes or kidney problems:   If you haven t had a blood test (creatinine test) within the last 30 days, go to your clinic or Diagnostic Imaging Department for this test.  If you have diabetes:   If your kidney function is normal, continue taking your metformin (Avandamet, Glucophage, Glucovance, Metaglip) on the day of your exam.   If your kidney function is abnormal, wait 48 hours before restarting this medicine.  You will have oral contrast for this exam:    You will drink the contrast at home. Get this from your clinic or Diagnostic Imaging Department. Please follow the directions given.  Please wear loose clothing, such as a sweat suit or jogging clothes. Avoid snaps, zippers and other metal. We may ask you to undress and put on a hospital gown.  If you have any questions, please call the Imaging Department where you will have your exam.            Sep 28, 2017  2:30 PM CDT   Return Visit with Nahum Pa MD   [facility] ([facility])    [facility]  [address]  [place] [address] [phone]              Future tests that were ordered for you today     Open Future Orders        Priority Expected Expires Ordered    CT Abdomen Pelvis w Contrast Routine  9/20/2018 9/20/2017            Who to contact     If you have questions or need follow up information about today's clinic visit or your schedule please contact [facility] directly at [phone].  Normal or non-critical lab and imaging results will be communicated to you by Localocracyhart, letter or phone within 4 business days after the clinic has received the results. If you do not hear from us within 7 days, please contact the clinic through Fairphonet or phone. If you have a critical or abnormal lab result, we will notify you by phone as soon as possible.  Submit refill requests through [email or web address] or call your pharmacy and they will forward the refill request to us. Please allow 3 business days for your refill to be completed.          Additional Information About Your Visit        [email or web address] Information     [email or web address] gives you secure access to your electronic health record. If you see a primary care provider, you can also send messages to your care team and make appointments. If you have questions, please call your primary care clinic.  If you do not have a primary care provider, please call [phone] and they will assist you.        Care  EveryWhere ID     This is your Care EveryWhere ID. This could be used by other organizations to access your [place] medical records  [id number]        Your Vitals Were     Pulse Temperature Respirations Pulse Oximetry BMI (Body Mass Index)       86 99  F (37.2  C) (Oral) 20 95% 31.07 kg/m2        Blood Pressure from Last 3 Encounters:   09/20/17 130/83   08/24/17 123/80   08/14/17 126/70    Weight from Last 3 Encounters:   09/20/17 82.1 kg (181 lb)   08/24/17 80.6 kg (177 lb 9.6 oz)   08/14/17 79.7 kg (175 lb 9.6 oz)               Primary Care Provider Office Phone # Fax #    Alison Pena [phone]       [address]  [place] [address]        Equal Access to Services     [facility]: Hadii юлия christian hadasho Soaustyn, waaxda luqadaha, qaybta kaalmada alanyada, sky vides . So [facility] [phone].    ATENCIÓN: Si habla español, tiene a gaytan disposición servicios gratuitos de asistencia lingüística. Chelsi al [phone].    We comply with applicable federal civil rights laws and [place] laws. We do not discriminate on the basis of race, color, national origin, age, disability sex, sexual orientation or gender identity.            Thank you!     Thank you for choosing [facility]  for your care. Our goal is always to provide you with excellent care. Hearing back from our patients is one way we can continue to improve our services. Please take a few minutes to complete the written survey that you may receive in the mail after your visit with us. Thank you!             Your Updated Medication List - Protect others around you: Learn how to safely use, store and throw away your medicines at www.disposemymeds.org.          This list is accurate as of: 9/20/17  2:15 PM.  Always use your most recent med list.                   Brand Name Dispense Instructions for use Diagnosis    CENTRUM SILVER per tablet      Take 1 tablet by mouth daily        folic  acid 1 MG tablet    FOLVITE    30 tablet    Take 1 tablet (1 mg) by mouth daily    Alcohol abuse       furosemide 20 MG tablet    LASIX    30 tablet    Take 1 tablet (20 mg) by mouth as needed        gabapentin 300 MG capsule    NEURONTIN    90 capsule    Take 1 capsule (300 mg) by mouth 3 times daily Pt takes BID    Chronic pain syndrome       magnesium oxide 400 MG tablet    MAG-OX    7 tablet    Take 1 tablet (400 mg) by mouth daily    Low magnesium levels       melatonin 3 MG tablet      Take 2 tablets (6 mg) by mouth nightly as needed for sleep        mirtazapine 7.5 MG Tabs tablet    REMERON    90 tablet    Take 1 tablet (7.5 mg) by mouth At Bedtime    Depression, unspecified depression type       naproxen 500 MG tablet    NAPROSYN    60 tablet    Take 1 tablet (500 mg) by mouth 2 times daily (with meals)        polyethylene glycol Packet    MIRALAX/GLYCOLAX    7 packet    Take 17 g by mouth daily as needed (constipation)    Constipation, unspecified constipation type       RESTORIL PO      Take by mouth At Bedtime    Thrombocytopenia (H), Liver disease, chronic, due to alcohol (H)       senna-docusate 8.6-50 MG per tablet    SENOKOT-S;PERICOLACE    100 tablet    Take 1-2 tablets by mouth 2 times daily as needed for constipation    Constipation, unspecified constipation type       spironolactone 100 MG tablet    ALDACTONE     100 mg daily        sucralfate 1 GM/10ML suspension    CARAFATE    1200 mL    Take 10 mLs (1 g) by mouth 4 times daily (before meals and nightly) Pt takes once daily    Esophagitis       thiamine 100 MG tablet      Take 1 tablet (100 mg) by mouth daily    Alcohol abuse       traZODone 100 MG tablet    DESYREL    90 tablet    Take 1 tablet (100 mg) by mouth nightly as needed for sleep        valACYclovir 1000 mg tablet    VALTREX    4 tablet    TAKE 2 TABS EVERY 12 HRS FOR 1 DAY ONLY FOR OUTBREAKS OF COLD SORES as needed    Herpes simplex virus infection       venlafaxine 37.5 MG tablet     EFFEXOR    60 tablet    Take 1 tablet (37.5 mg) by mouth 2 times daily

## 2017-09-24 NOTE — PROGRESS NOTES
HEMATOLOGY HISTORY: Ms. Headley is a 71 year old female with thrombocytopenia.  1.  On 05/26/2017, WBC of 8.1, hemoglobin of 13.6 and platelets of 242.     2.  On 06/06/2017:  -Platelets of 217.  Her platelets then started to progressively decrease.  It decreased to 51 on 06/12/2017.  WBC has remained normal.  Hemoglobin decreased slightly to 11.3.   -Chemistry panel on 06/06/2017 revealed elevated AST, ALT, alkaline phosphatase and bilirubin.   3.  Ultrasound abdomen on 06/07/2017 reveals fatty infiltration of the liver.    4. Hepatitis B and C on 06/09/2017 are negative.   5.  Multiple labs done on 06/11/2017:   -INR 1.15.   -Fibrinogen of 194.   -D-dimer of 1.8.   -HIT antibody negative.   6. EUS on 06/12/2017 revealed changes of pancreatitis.  7. On 06/14/2017:  -Vitamin B12 of 1832  -Folate of 26.9  8. CT abdomen and pelvis on 06/14/2017 revealed diffuse fatty infiltration of liver. Spleen size is normal.  There are scattered bilateral inguinal lymph node.  Largest is 2.6 cm.  It appears to have a fatty hilum.  Probably benign.     SUBJECTIVE:    Ms. Headley is a 71-year-old female with thrombocytopenia and anemia.  She is here for follow-up.    Thrombocytopenia was due to multiple reasons including infection, antibiotics and alcohol.  Thrombocytopenia has resolved.  Patient has anemia. This is due to multiple factors including liver disease.  Anemia is improving.      Patient has quit alcohol.  She hasn't had any alcohol since last hospitalization.    Review of system:  Patient is doing well.  Denies any pain.  No headache.  No dizziness.  No chest pain.  No difficulty breathing.  No abdominal pain.  No nausea.  No vomiting.  Appetite is good.  No urinary or bowel complaints.  No bleeding.        PHYSICAL EXAMINATION:    GENERAL:  She is alert and oriented x3.  VITAL SIGNS: Reviewed.  Rest of the system not examined.    LABS: Reviewed.  WBC and platelets normal.  Hemoglobin mildly low at 11.4.     ASSESSMENT:     1.  A 71-year-old female with thrombocytopenia.  Thrombocytopenia has resolved.  2.  Normochromic, normocytic anemia.  Improving.  3.  Alcoholic abuse.  Patient has quit alcohol.  4.  Fatty liver and mild inguinal lymphadenopathy seen on CT scan done in June, 2017.     PLAN:    1. Patient is doing better.  Labs were reviewed with her.  Explained to her that WBC and platelets are normal.  Her anemia is much improved.  She will have CBC monitored periodically with her PCP.     2.  Discussed regarding alcoholic liver disease.  She has stopped alcohol.  LFT is better.  Patient advised not to resume alcohol.      3.  Last CT scan in June 2017 had revealed fatty liver and inguinal lymphadenopathy.  For follow up, we will get CT abdomen and pelvis.  Patient agreeable for it.     4.  She had a few questions, which were all answered.  I will see her after the CT scan.

## 2017-09-25 ENCOUNTER — HOSPITAL ENCOUNTER (OUTPATIENT)
Dept: CT IMAGING | Facility: CLINIC | Age: 72
Discharge: HOME | End: 2017-09-25
Attending: INTERNAL MEDICINE | Admitting: INTERNAL MEDICINE
Payer: MEDICARE

## 2017-09-25 DIAGNOSIS — R59.0 INGUINAL ADENOPATHY: ICD-10-CM

## 2017-09-25 DIAGNOSIS — K76.0 FATTY LIVER: ICD-10-CM

## 2017-09-25 LAB
CREAT BLD-MCNC: 0.9 MG/DL (ref 0.52–1.04)
GFR SERPL CREATININE-BSD FRML MDRD: 61 ML/MIN/1.7M2

## 2017-09-25 PROCEDURE — 74177 CT ABD & PELVIS W/CONTRAST: CPT

## 2017-09-25 PROCEDURE — 25000128 H RX IP 250 OP 636: Performed by: INTERNAL MEDICINE

## 2017-09-25 PROCEDURE — 82565 ASSAY OF CREATININE: CPT

## 2017-09-25 PROCEDURE — 25000125 ZZHC RX 250: Performed by: INTERNAL MEDICINE

## 2017-09-25 RX ORDER — IOPAMIDOL 755 MG/ML
89 INJECTION, SOLUTION INTRAVASCULAR ONCE
Status: COMPLETED | OUTPATIENT
Start: 2017-09-25 | End: 2017-09-25

## 2017-09-25 RX ADMIN — IOPAMIDOL 89 ML: 755 INJECTION, SOLUTION INTRAVENOUS at 15:05

## 2017-09-25 RX ADMIN — SODIUM CHLORIDE 65 ML: 9 INJECTION, SOLUTION INTRAVENOUS at 15:05

## 2017-09-28 ENCOUNTER — ONCOLOGY VISIT (OUTPATIENT)
Dept: ONCOLOGY | Facility: CLINIC | Age: 72
End: 2017-09-28
Attending: INTERNAL MEDICINE
Payer: COMMERCIAL

## 2017-09-28 VITALS
HEART RATE: 90 BPM | RESPIRATION RATE: 16 BRPM | WEIGHT: 182.6 LBS | TEMPERATURE: 98.2 F | OXYGEN SATURATION: 97 % | SYSTOLIC BLOOD PRESSURE: 136 MMHG | DIASTOLIC BLOOD PRESSURE: 82 MMHG | BODY MASS INDEX: 31.34 KG/M2

## 2017-09-28 DIAGNOSIS — R59.0 INGUINAL LYMPHADENOPATHY: Primary | ICD-10-CM

## 2017-09-28 DIAGNOSIS — D53.9 MACROCYTIC ANEMIA: ICD-10-CM

## 2017-09-28 DIAGNOSIS — K76.0 FATTY LIVER: ICD-10-CM

## 2017-09-28 PROCEDURE — 99213 OFFICE O/P EST LOW 20 MIN: CPT | Performed by: INTERNAL MEDICINE

## 2017-09-28 PROCEDURE — 99211 OFF/OP EST MAY X REQ PHY/QHP: CPT

## 2017-09-28 ASSESSMENT — PAIN SCALES - GENERAL: PAINLEVEL: NO PAIN (0)

## 2017-09-28 NOTE — PATIENT INSTRUCTIONS
CT pelvis in 6 month.  Scheduled./Arleen  Follow after CT with labs.  Scheduled/Arleen    AVS Printed and given to patient/Arleen

## 2017-09-28 NOTE — PROGRESS NOTES
"Oncology Rooming Note    September 28, 2017 2:32 PM   Kavitha Headley is a 71 year old female who presents for:    Chief Complaint   Patient presents with     Oncology Clinic Visit     Hypokalemia     Initial Vitals: /82 (BP Location: Left arm, Patient Position: Sitting, Cuff Size: Adult Large)  Pulse 90  Temp 98.2  F (36.8  C) (Oral)  Resp 16  Wt 82.8 kg (182 lb 9.6 oz)  SpO2 97%  BMI 31.34 kg/m2 Estimated body mass index is 31.34 kg/(m^2) as calculated from the following:    Height as of 8/24/17: 1.626 m (5' 4\").    Weight as of this encounter: 82.8 kg (182 lb 9.6 oz). Body surface area is 1.93 meters squared.  No Pain (0) Comment: Data Unavailable   No LMP recorded. Patient has had a hysterectomy.  Allergies reviewed: Yes  Medications reviewed: Yes    Medications: Medication refills not needed today.  Pharmacy name entered into EPIC:    ROMERO MATHIS  [id number] [place] [facility] [address]. [place] [address] [place] [facility] [place] [facility] [address]    Clinical concerns: None                       4 minutes for nursing intake (face to face time)     Monica Hanna MA    DISCHARGE PLAN:  Next appointments: See patient instruction section.  Brought the patient to the [facility] for scheduling.  Departure Mode: Ambulatory  Accompanied by: self  5 minutes for nursing discharge (face to face time)   Monica Hanna MA    "

## 2017-09-28 NOTE — MR AVS SNAPSHOT
After Visit Summary   9/28/2017    Kavitha Headley    MRN: [id number]           Patient Information     Date Of Birth          1945        Visit Information        Provider Department      9/28/2017 2:30 PM Nahum Pa MD [facility]        Today's Diagnoses     Inguinal lymphadenopathy    -  1    Macrocytic anemia        Fatty liver          Care Instructions    CT pelvis in 6 month.  Follow after CT with labs.          Follow-ups after your visit        Your next 10 appointments already scheduled     Mar 26, 2018  1:30 PM CDT   Return Visit with  Oncology Nurse   [facility] ([facility])    [facility]  [address]  [facility] [address] [phone]            Mar 26, 2018  2:00 PM CDT   CT PELVIS W CONTRAST with SHCT1   [facility] CT ([facility])    [address] [address] [phone]           Please bring any scans or X-rays taken at other hospitals, if similar tests were done. Also bring a list of your medicines, including vitamins, minerals and over-the-counter drugs. It is safest to leave personal items at home.  Be sure to tell your doctor:   If you have any allergies.   If there s any chance you are pregnant.   If you are breastfeeding.   If you have any special needs.  You may have contrast for this exam. To prepare:   Do not eat or drink for 2 hours before your exam. If you need to take medicine, you may take it with small sips of water. (We may ask you to take liquid medicine as well.)   The day before your exam, drink extra fluids at least six 8-ounce glasses (unless your doctor tells you to restrict your fluids).  Patients over 70 or patients with diabetes or kidney problems:   If you haven t had a blood test (creatinine test) within the last 30 days, go to your clinic or Diagnostic Imaging Department for this test.  If you have diabetes:   If your  kidney function is normal, continue taking your metformin (Avandamet, Glucophage, Glucovance, Metaglip) on the day of your exam.   If your kidney function is abnormal, wait 48 hours before restarting this medicine.  You will have oral contrast for this exam:   You will drink the contrast at home. Get this from your clinic or Diagnostic Imaging Department. Please follow the directions given.  Please wear loose clothing, such as a sweat suit or jogging clothes. Avoid snaps, zippers and other metal. We may ask you to undress and put on a hospital gown.  If you have any questions, please call the Imaging Department where you will have your exam.            Mar 29, 2018  2:00 PM CDT   Return Visit with Nahum Pa MD   [facility] ([facility])    [facility]  [address]  [facility] [address] [phone]              Future tests that were ordered for you today     Open Future Orders        Priority Expected Expires Ordered    CBC with platelets Routine 3/28/2018 5/28/2018 9/28/2017    Hepatic panel (Albumin, ALT, AST, Bili, Alk Phos, TP) Routine 3/28/2018 5/28/2018 9/28/2017    CT Pelvis w Contrast Routine 3/28/2018 9/28/2018 9/28/2017            Who to contact     If you have questions or need follow up information about today's clinic visit or your schedule please contact [facility] directly at [phone].  Normal or non-critical lab and imaging results will be communicated to you by MyChart, letter or phone within 4 business days after the clinic has received the results. If you do not hear from us within 7 days, please contact the clinic through MyChart or phone. If you have a critical or abnormal lab result, we will notify you by phone as soon as possible.  Submit refill requests through [employer] or call your pharmacy and they will forward the refill request to us. Please allow 3 business days for your refill to be completed.           Additional Information About Your Visit        MyChart Information     [employer] gives you secure access to your electronic health record. If you see a primary care provider, you can also send messages to your care team and make appointments. If you have questions, please call your primary care clinic.  If you do not have a primary care provider, please call [phone] and they will assist you.        Care EveryWhere ID     This is your Care EveryWhere ID. This could be used by other organizations to access your [facility] medical records  [id number]        Your Vitals Were     Pulse Temperature Respirations Pulse Oximetry BMI (Body Mass Index)       90 98.2  F (36.8  C) (Oral) 16 97% 31.34 kg/m2        Blood Pressure from Last 3 Encounters:   09/28/17 136/82   09/20/17 130/83   08/24/17 123/80    Weight from Last 3 Encounters:   09/28/17 82.8 kg (182 lb 9.6 oz)   09/20/17 82.1 kg (181 lb)   08/24/17 80.6 kg (177 lb 9.6 oz)               Primary Care Provider Office Phone # Fax #    Alison Pena [phone]       [address] [place] [facility] [place] [address]        Equal Access to Services     [facility] Center[facility] AH: Hadii aad ku hadasho Soomaali, waaxda luqadaha, qaybta kaalmada adeegyada, waxay idiin hayshanellen alan ramon ladona ah. So [facility] [phone].    ATENCIÓN: Si habla español, tiene a gaytan disposición servicios gratuitos de asistencia lingüística. Llveronique al [phone].    We comply with applicable federal civil rights laws and [place] laws. We do not discriminate on the basis of race, color, national origin, age, disability sex, sexual orientation or gender identity.            Thank you!     Thank you for choosing [facility]  for your care. Our goal is always to provide you with excellent care. Hearing back from our patients is one way we can continue to improve our services. Please take a few minutes to complete the written survey that you may receive in the mail after your visit with  us. Thank you!             Your Updated Medication List - Protect others around you: Learn how to safely use, store and throw away your medicines at www.disposemymeds.org.          This list is accurate as of: 9/28/17  3:20 PM.  Always use your most recent med list.                   Brand Name Dispense Instructions for use Diagnosis    CENTRUM SILVER per tablet      Take 1 tablet by mouth daily        folic acid 1 MG tablet    FOLVITE    30 tablet    Take 1 tablet (1 mg) by mouth daily    Alcohol abuse       furosemide 20 MG tablet    LASIX    30 tablet    Take 1 tablet (20 mg) by mouth as needed        gabapentin 300 MG capsule    NEURONTIN    90 capsule    Take 1 capsule (300 mg) by mouth 3 times daily Pt takes BID    Chronic pain syndrome       magnesium oxide 400 MG tablet    MAG-OX    7 tablet    Take 1 tablet (400 mg) by mouth daily    Low magnesium levels       melatonin 3 MG tablet      Take 2 tablets (6 mg) by mouth nightly as needed for sleep        mirtazapine 7.5 MG Tabs tablet    REMERON    90 tablet    Take 1 tablet (7.5 mg) by mouth At Bedtime    Depression, unspecified depression type       naproxen 500 MG tablet    NAPROSYN    60 tablet    Take 1 tablet (500 mg) by mouth 2 times daily (with meals)        polyethylene glycol Packet    MIRALAX/GLYCOLAX    7 packet    Take 17 g by mouth daily as needed (constipation)    Constipation, unspecified constipation type       RESTORIL PO      Take by mouth At Bedtime    Thrombocytopenia (H), Liver disease, chronic, due to alcohol (H)       senna-docusate 8.6-50 MG per tablet    SENOKOT-S;PERICOLACE    100 tablet    Take 1-2 tablets by mouth 2 times daily as needed for constipation    Constipation, unspecified constipation type       spironolactone 100 MG tablet    ALDACTONE     100 mg daily        sucralfate 1 GM/10ML suspension    CARAFATE    1200 mL    Take 10 mLs (1 g) by mouth 4 times daily (before meals and nightly) Pt takes once daily    Esophagitis        thiamine 100 MG tablet      Take 1 tablet (100 mg) by mouth daily    Alcohol abuse       traZODone 100 MG tablet    DESYREL    90 tablet    Take 1 tablet (100 mg) by mouth nightly as needed for sleep        valACYclovir 1000 mg tablet    VALTREX    4 tablet    TAKE 2 TABS EVERY 12 HRS FOR 1 DAY ONLY FOR OUTBREAKS OF COLD SORES as needed    Herpes simplex virus infection       venlafaxine 37.5 MG tablet    EFFEXOR    60 tablet    Take 1 tablet (37.5 mg) by mouth 2 times daily

## 2017-09-29 NOTE — PROGRESS NOTES
HEMATOLOGY HISTORY: Ms. Headley is a 71 year old female with thrombocytopenia.  1.  On 05/26/2017, WBC of 8.1, hemoglobin of 13.6 and platelets of 242.     2.  On 06/06/2017:  -Platelets of 217.  Her platelets then started to progressively decrease.  It decreased to 51 on 06/12/2017.  WBC has remained normal.  Hemoglobin decreased slightly to 11.3.   -Chemistry panel on 06/06/2017 revealed elevated AST, ALT, alkaline phosphatase and bilirubin.   3.  Ultrasound abdomen on 06/07/2017 reveals fatty infiltration of the liver.    4. Hepatitis B and C on 06/09/2017 are negative.   5.  Multiple labs done on 06/11/2017:   -INR 1.15.   -Fibrinogen of 194.   -D-dimer of 1.8.   -HIT antibody negative.   6. EUS on 06/12/2017 revealed changes of pancreatitis.  7. On 06/14/2017:  -Vitamin B12 of 1832  -Folate of 26.9  8. CT abdomen and pelvis on 06/14/2017 revealed diffuse fatty infiltration of liver. Spleen size is normal.  There are scattered bilateral inguinal lymph node.  Largest is 2.6 cm.  It appears to have a fatty hilum.  Probably benign.  9. CT abdomen and pelvis on 09/25/2017 reveals mildly enlarged bilateral inguinal lymph node.  They have normal fatty leilani. Hepatic steatosis has resolved.    SUBJECTIVE:    Ms. Kavitha Hartman is a 71-year-old female who has been seen in Hematology/Oncology Clinic for thrombocytopenia.  Thrombocytopenia is secondary to alcohol abuse.  Patient has quit alcohol.  Platelet on 09/20/2017 is normal at 398.      CT scan on 06/14/2017 had revealed fatty liver and inguinal lymphadenopathy.  A followup CT was recommended.  CT abdomen and pelvis was done on 09/25/2017.  It reveals mildly enlarged bilateral inguinal lymph node.  They have normal fatty leilani suggestive of reactive lymph node.  Hepatic steatosis has resolved.  No new lymphadenopathy.  No arthritis.      Patient overall is doing well.  She is not drinking alcohol anymore.  No headache.  No dizziness.  No chest pain or difficulty  breathing.  No nausea or vomiting.  No bleeding.      ASSESSMENT:   1.  A 71-year-old female with mildly enlarging inguinal lymph node.  Most likely active lymphadenopathy.   2.  Mild normochromic normocytic anemia.   3.  Thrombocytopenia secondary to alcohol.  Thrombocytopenia has resolved.     4.  History of alcohol abuse. Patient has quit alcohol.      PLAN:   1.  CT scan was reviewed.  Discussed regarding inguinal lymphadenopathy.  It is slightly larger.  I discussed this with the radiologist.  It looks normal.  They think it is reactive lymphadenopathy.  Followup recommended.  Will get CT pelvis in 6 months to follow up on this inguinal lymphadenopathy.   2. She was very happy to know that hepatic steatosis has improved. Advised not to resume alcohol.   3.  I will see her in 6 months' time after the CT scan with CBC and LFT. Advised her to see a physician if she has fevers, chills, night sweats, lump, weight loss or any other concerns.         CHRISTIE DAY MD          D: 2017 16:08   T: 2017 22:22   MT:       Name:     DARWIN JASSO   MRN:      [id number]        Account:      [id number]   :      1945           Visit Date:   2017      Document: [id number]

## 2017-11-25 ENCOUNTER — NURSE TRIAGE (OUTPATIENT)
Dept: NURSING | Facility: CLINIC | Age: 72
End: 2017-11-25

## 2017-11-25 DIAGNOSIS — K70.9 LIVER DISEASE, CHRONIC, DUE TO ALCOHOL (H): ICD-10-CM

## 2017-11-25 DIAGNOSIS — D69.6 THROMBOCYTOPENIA (H): ICD-10-CM

## 2017-11-25 NOTE — TELEPHONE ENCOUNTER
"  Reason for Disposition    Caller requesting a NON-URGENT new prescription or refill and triager unable to refill per unit policy     \"I am trying to get a refill on  Temazepam (RESTORIL PO)     --  Sig: Take by mouth At Bedtime\"  This per epic is reported by patient. The MD on the bottle is a non [facility] MD. I advised to call her PCP on Monday 11/27 to discuss. Patient agrees.    Additional Information    Negative: Drug overdose and nurse unable to answer question    Negative: Caller requesting information not related to medicine    Negative: Caller requesting a prescription for Strep throat and has a positive culture result    Negative: Rash while taking a medication or within 3 days of stopping it    Negative: Immunization reaction suspected    Negative: [1] Asthma and [2] having symptoms of asthma (cough, wheezing, etc)    Negative: MORE THAN A DOUBLE DOSE of a prescription or over-the-counter (OTC) drug    Negative: [1] DOUBLE DOSE (an extra dose or lesser amount) of over-the-counter (OTC) drug AND [2] any symptoms (e.g., dizziness, nausea, pain, sleepiness)    Negative: [1] DOUBLE DOSE (an extra dose or lesser amount) of prescription drug AND [2] any symptoms (e.g., dizziness, nausea, pain, sleepiness)    Negative: Took another person's prescription drug    Negative: [1] DOUBLE DOSE (an extra dose or lesser amount) of prescription drug AND [2] NO symptoms (Exception: a double dose of antibiotics)    Negative: Diabetes drug error or overdose (e.g., insulin or extra dose)    Negative: [1] Request for URGENT new prescription or refill of \"essential\" medication (i.e., likelihood of harm to patient if not taken) AND [2] triager unable to fill per unit policy    Negative: [1] Prescription not at pharmacy AND [2] was prescribed today by PCP    Negative: Pharmacy calling with prescription questions and triager unable to answer question    Negative: Caller has URGENT medication question about med that PCP prescribed and " triager unable to answer question    Negative: Caller has NON-URGENT medication question about med that PCP prescribed and triager unable to answer question    Protocols used: MEDICATION QUESTION CALL-ADULT-AH

## 2017-11-25 NOTE — TELEPHONE ENCOUNTER
Pharmacist requesting order to refill Temazepam and has sent a request to provider.  FNA advised would have to await PCP as it is self reported by patient.

## 2017-11-27 NOTE — TELEPHONE ENCOUNTER
Temazepam      Last Office Visit: 8/14/17  Last Written Prescription Date:12/20/16  Last Fill Quantity: 30,   # refills: 5  Future Office visit:       Routing refill request to provider for review/approval because:  Drug not active on patient's medication list

## 2017-11-28 RX ORDER — TEMAZEPAM 15 MG/1
CAPSULE ORAL
Qty: 30 CAPSULE | Refills: 5 | Status: SHIPPED | OUTPATIENT
Start: 2017-11-28 | End: 2018-02-06

## 2017-12-03 ENCOUNTER — COMMUNICATION - HEALTHEAST (OUTPATIENT)
Dept: BEHAVIORAL HEALTH | Facility: CLINIC | Age: 72
End: 2017-12-03

## 2017-12-03 DIAGNOSIS — M79.10 MYALGIA: ICD-10-CM

## 2017-12-03 DIAGNOSIS — F10.20 ALCOHOL USE DISORDER, SEVERE, DEPENDENCE (H): ICD-10-CM

## 2017-12-04 DIAGNOSIS — G89.4 CHRONIC PAIN SYNDROME: Primary | ICD-10-CM

## 2017-12-04 RX ORDER — BACLOFEN 10 MG/1
5 TABLET ORAL 2 TIMES DAILY PRN
Qty: 30 TABLET | Refills: 0 | Status: SHIPPED | OUTPATIENT
Start: 2017-12-04 | End: 2018-01-10

## 2017-12-04 NOTE — TELEPHONE ENCOUNTER
baclofen      Last Written Prescription Date:  03/2016  Last Fill Quantity: 180,   # refills: 1  Last Office Visit: 08/14/17  Future Office visit:       Routing refill request to provider for review/approval because:  Drug not active on patient's medication list- per reconcile and dispence patient lat refill was 11/12/17 #40

## 2017-12-14 DIAGNOSIS — N95.1 SYMPTOMATIC MENOPAUSAL OR FEMALE CLIMACTERIC STATES: ICD-10-CM

## 2017-12-14 RX ORDER — VENLAFAXINE 37.5 MG/1
TABLET ORAL
Qty: 180 TABLET | Refills: 0 | Status: SHIPPED | OUTPATIENT
Start: 2017-12-14 | End: 2018-02-05 | Stop reason: ALTCHOICE

## 2017-12-14 NOTE — TELEPHONE ENCOUNTER
PHQ-9 SCORE 10/6/2016 12/12/2016 8/14/2017   Total Score - - -   Total Score 4 4 2     Prescription approved per [facility], [facility] or [facility] refill protocol.  Samantha Saavedra RN - Triage  [facility]

## 2017-12-14 NOTE — TELEPHONE ENCOUNTER
Last Written Prescription Date:  8/21/17  Last Fill Quantity: 60,  # refills: 1   Last Office Visit with [facility], [facility] or [facility] prescribing provider:  8/14/17   Future Office Visit:     Requested Prescriptions   Pending Prescriptions Disp Refills     venlafaxine (EFFEXOR) 37.5 MG tablet [Pharmacy Med Name: VENLAFAXINE HCL 37.5 MG TABLET] 180 tablet 0     Sig: TAKE 1 TABLET (37.5 MG) BY MOUTH 2 TIMES DAILY OFFICE VISIT REQUIRED FOR FURTHER FILLS    Serotonin-Norepinephrine Reuptake Inhibitors  Failed    12/14/2017 12:03 PM       Failed - Recent or future visit with authorizing provider's specialty    Patient had office visit in the last year or has a visit in the next 30 days with authorizing provider.  See chart review.              Failed - Recent (6 mo) or future visit with authorizing provider's specialty    Patient had office visit in the last 6 months or has a visit in the next 30 days with authorizing provider.  See chart review.            Passed - Blood pressure under 140/90    BP Readings from Last 3 Encounters:   09/28/17 136/82   09/20/17 130/83   08/24/17 123/80                Passed - PHQ-9 score of less than 5 in past 6 months    Please review PHQ-9 score.          Passed - Patient is age 18 or older       Passed - No active pregnancy on record       Passed - Normal serum creatinine on file in past 12 months    Recent Labs   Lab Test  08/03/17   1430   CR  1.01            Passed - No positive pregnancy test in past 12 months

## 2017-12-30 ENCOUNTER — COMMUNICATION - HEALTHEAST (OUTPATIENT)
Dept: BEHAVIORAL HEALTH | Facility: CLINIC | Age: 72
End: 2017-12-30

## 2018-01-02 DIAGNOSIS — G89.4 CHRONIC PAIN SYNDROME: ICD-10-CM

## 2018-01-02 NOTE — TELEPHONE ENCOUNTER
Reason for Call:  Medication or medication refill:    Do you use a [facility]?  Name of the pharmacy and phone number for the current request:  [facility] [place] - [phone]    Name of the medication requested: gabapentin (NEURONTIN) 300 MG capsule    Other request: Verify Pharmacy please    Can we leave a detailed message on this number? YES    Phone number patient can be reached at: Home number on file [phone] (home)    Best Time:     Call taken on 1/2/2018 at 1:50 PM by Merlene Mcmahon

## 2018-01-03 RX ORDER — GABAPENTIN 300 MG/1
300 CAPSULE ORAL 3 TIMES DAILY
Qty: 270 CAPSULE | Refills: 1 | Status: SHIPPED | OUTPATIENT
Start: 2018-01-03 | End: 2018-05-29

## 2018-01-03 NOTE — TELEPHONE ENCOUNTER
Gabapentin      Last Written Prescription Date: Historical  Last Fill Quantity: 0,  # refills: 0   Last Office Visit with [email or web address] or [facility] prescribing provider: 8/14/2017                                             Routing refill request to provider for review/approval because:  Drug not on the [facility] refill protocol   Samantha Saavedra RN - Triage  [facility]

## 2018-01-20 ENCOUNTER — COMMUNICATION - HEALTHEAST (OUTPATIENT)
Dept: BEHAVIORAL HEALTH | Facility: CLINIC | Age: 73
End: 2018-01-20

## 2018-02-05 ENCOUNTER — TELEPHONE (OUTPATIENT)
Dept: FAMILY MEDICINE | Facility: CLINIC | Age: 73
End: 2018-02-05

## 2018-02-05 ENCOUNTER — OFFICE VISIT (OUTPATIENT)
Dept: FAMILY MEDICINE | Facility: CLINIC | Age: 73
End: 2018-02-05
Payer: COMMERCIAL

## 2018-02-05 VITALS
HEART RATE: 113 BPM | HEIGHT: 64 IN | TEMPERATURE: 98.6 F | SYSTOLIC BLOOD PRESSURE: 132 MMHG | DIASTOLIC BLOOD PRESSURE: 78 MMHG | WEIGHT: 184.5 LBS | BODY MASS INDEX: 31.5 KG/M2 | OXYGEN SATURATION: 96 %

## 2018-02-05 DIAGNOSIS — E53.8 VITAMIN B12 DEFICIENCY: ICD-10-CM

## 2018-02-05 DIAGNOSIS — F10.21 SEVERE ALCOHOL DEPENDENCE IN EARLY REMISSION (H): Primary | ICD-10-CM

## 2018-02-05 DIAGNOSIS — F32.A DEPRESSION, UNSPECIFIED DEPRESSION TYPE: Primary | ICD-10-CM

## 2018-02-05 DIAGNOSIS — F32.0 MILD MAJOR DEPRESSION (H): ICD-10-CM

## 2018-02-05 DIAGNOSIS — K76.0 FATTY LIVER: ICD-10-CM

## 2018-02-05 DIAGNOSIS — D69.6 THROMBOCYTOPENIA (H): ICD-10-CM

## 2018-02-05 DIAGNOSIS — R59.1 LYMPHADENOPATHY: ICD-10-CM

## 2018-02-05 DIAGNOSIS — D53.9 MACROCYTIC ANEMIA: ICD-10-CM

## 2018-02-05 DIAGNOSIS — N18.30 CKD (CHRONIC KIDNEY DISEASE) STAGE 3, GFR 30-59 ML/MIN (H): ICD-10-CM

## 2018-02-05 DIAGNOSIS — Z12.31 VISIT FOR SCREENING MAMMOGRAM: ICD-10-CM

## 2018-02-05 DIAGNOSIS — N64.4 BREAST PAIN, RIGHT: ICD-10-CM

## 2018-02-05 DIAGNOSIS — I10 BENIGN HYPERTENSION: ICD-10-CM

## 2018-02-05 DIAGNOSIS — K70.9 LIVER DISEASE, CHRONIC, DUE TO ALCOHOL (H): ICD-10-CM

## 2018-02-05 DIAGNOSIS — I35.0 AORTIC VALVE STENOSIS, ETIOLOGY OF CARDIAC VALVE DISEASE UNSPECIFIED: ICD-10-CM

## 2018-02-05 DIAGNOSIS — G47.00 INSOMNIA, UNSPECIFIED TYPE: ICD-10-CM

## 2018-02-05 DIAGNOSIS — K70.10 ALCOHOLIC HEPATITIS WITHOUT ASCITES (H): ICD-10-CM

## 2018-02-05 LAB
ALBUMIN SERPL-MCNC: 4 G/DL (ref 3.4–5)
ALP SERPL-CCNC: 102 U/L (ref 40–150)
ALT SERPL W P-5'-P-CCNC: 62 U/L (ref 0–50)
ANION GAP SERPL CALCULATED.3IONS-SCNC: 5 MMOL/L (ref 3–14)
AST SERPL W P-5'-P-CCNC: 73 U/L (ref 0–45)
BILIRUB DIRECT SERPL-MCNC: 0.2 MG/DL (ref 0–0.2)
BILIRUB SERPL-MCNC: 0.5 MG/DL (ref 0.2–1.3)
BUN SERPL-MCNC: 12 MG/DL (ref 7–30)
CALCIUM SERPL-MCNC: 9.6 MG/DL (ref 8.5–10.1)
CHLORIDE SERPL-SCNC: 95 MMOL/L (ref 94–109)
CO2 SERPL-SCNC: 36 MMOL/L (ref 20–32)
CREAT SERPL-MCNC: 1 MG/DL (ref 0.52–1.04)
ERYTHROCYTE [DISTWIDTH] IN BLOOD BY AUTOMATED COUNT: 16.9 % (ref 10–15)
FOLATE SERPL-MCNC: 24.6 NG/ML
GFR SERPL CREATININE-BSD FRML MDRD: 54 ML/MIN/1.7M2
GGT SERPL-CCNC: 293 U/L (ref 0–40)
GLUCOSE SERPL-MCNC: 101 MG/DL (ref 70–99)
HCT VFR BLD AUTO: 35.6 % (ref 35–47)
HGB BLD-MCNC: 11.9 G/DL (ref 11.7–15.7)
MAGNESIUM SERPL-MCNC: 1.8 MG/DL (ref 1.6–2.3)
MCH RBC QN AUTO: 32.9 PG (ref 26.5–33)
MCHC RBC AUTO-ENTMCNC: 33.4 G/DL (ref 31.5–36.5)
MCV RBC AUTO: 98 FL (ref 78–100)
PLATELET # BLD AUTO: 300 10E9/L (ref 150–450)
POTASSIUM SERPL-SCNC: 3.9 MMOL/L (ref 3.4–5.3)
PROT SERPL-MCNC: 7.5 G/DL (ref 6.8–8.8)
RBC # BLD AUTO: 3.62 10E12/L (ref 3.8–5.2)
SODIUM SERPL-SCNC: 136 MMOL/L (ref 133–144)
TSH SERPL DL<=0.005 MIU/L-ACNC: 1.4 MU/L (ref 0.4–4)
VIT B12 SERPL-MCNC: 198 PG/ML (ref 193–986)
WBC # BLD AUTO: 5.9 10E9/L (ref 4–11)

## 2018-02-05 PROCEDURE — 82306 VITAMIN D 25 HYDROXY: CPT | Performed by: PHYSICIAN ASSISTANT

## 2018-02-05 PROCEDURE — 80048 BASIC METABOLIC PNL TOTAL CA: CPT | Performed by: PHYSICIAN ASSISTANT

## 2018-02-05 PROCEDURE — 82977 ASSAY OF GGT: CPT | Performed by: PHYSICIAN ASSISTANT

## 2018-02-05 PROCEDURE — 85027 COMPLETE CBC AUTOMATED: CPT | Performed by: PHYSICIAN ASSISTANT

## 2018-02-05 PROCEDURE — 82746 ASSAY OF FOLIC ACID SERUM: CPT | Performed by: PHYSICIAN ASSISTANT

## 2018-02-05 PROCEDURE — 80076 HEPATIC FUNCTION PANEL: CPT | Performed by: PHYSICIAN ASSISTANT

## 2018-02-05 PROCEDURE — 82607 VITAMIN B-12: CPT | Performed by: PHYSICIAN ASSISTANT

## 2018-02-05 PROCEDURE — 36415 COLL VENOUS BLD VENIPUNCTURE: CPT | Performed by: PHYSICIAN ASSISTANT

## 2018-02-05 PROCEDURE — 83735 ASSAY OF MAGNESIUM: CPT | Performed by: PHYSICIAN ASSISTANT

## 2018-02-05 PROCEDURE — 84443 ASSAY THYROID STIM HORMONE: CPT | Performed by: PHYSICIAN ASSISTANT

## 2018-02-05 PROCEDURE — 99215 OFFICE O/P EST HI 40 MIN: CPT | Performed by: PHYSICIAN ASSISTANT

## 2018-02-05 RX ORDER — FUROSEMIDE 20 MG
20 TABLET ORAL PRN
Qty: 90 TABLET | Refills: 1 | COMMUNITY
Start: 2018-02-05 | End: 2018-08-03

## 2018-02-05 RX ORDER — TRAZODONE HYDROCHLORIDE 100 MG/1
100 TABLET ORAL
Qty: 90 TABLET | Refills: 1 | Status: SHIPPED | OUTPATIENT
Start: 2018-02-05 | End: 2018-07-31

## 2018-02-05 ASSESSMENT — ANXIETY QUESTIONNAIRES
IF YOU CHECKED OFF ANY PROBLEMS ON THIS QUESTIONNAIRE, HOW DIFFICULT HAVE THESE PROBLEMS MADE IT FOR YOU TO DO YOUR WORK, TAKE CARE OF THINGS AT HOME, OR GET ALONG WITH OTHER PEOPLE: SOMEWHAT DIFFICULT
2. NOT BEING ABLE TO STOP OR CONTROL WORRYING: NOT AT ALL
6. BECOMING EASILY ANNOYED OR IRRITABLE: NOT AT ALL
1. FEELING NERVOUS, ANXIOUS, OR ON EDGE: SEVERAL DAYS
3. WORRYING TOO MUCH ABOUT DIFFERENT THINGS: NOT AT ALL
GAD7 TOTAL SCORE: 1
5. BEING SO RESTLESS THAT IT IS HARD TO SIT STILL: NOT AT ALL
7. FEELING AFRAID AS IF SOMETHING AWFUL MIGHT HAPPEN: NOT AT ALL

## 2018-02-05 ASSESSMENT — PATIENT HEALTH QUESTIONNAIRE - PHQ9: 5. POOR APPETITE OR OVEREATING: NOT AT ALL

## 2018-02-05 NOTE — TELEPHONE ENCOUNTER
Devi Ibarra contacted Kavitha on 02/05/18 and left a message. If patient calls back please contact RN team.  Merlene Ibarra RN  [facility] Triage

## 2018-02-05 NOTE — TELEPHONE ENCOUNTER
Please call patient.  After reviewing her hospitalization and recommendations I would advise that she see [facility] pharmacy (she has seen them in the past) as well as psychiatry.  I have placed both of these referrals and would like to await psychiatry's recommendation before making any changes.  Please assist her with scheduling.      I would be willing to restart brintellix that was tried in the hospital instead of her venlafaxine (pt does not feel this is helping).  I cannot tell via her chart if she stopped this due to insurance issues or due to it not working (TRIAGE: perhaps you can call her pharmacy to see if it was ever covered?)      Nayeli Castorena, MS, PA-C

## 2018-02-05 NOTE — MR AVS SNAPSHOT
After Visit Summary   2/5/2018    Kavitha Headley    MRN: [id number]           Patient Information     Date Of Birth          1945        Visit Information        Provider Department      2/5/2018 12:00 PM Nayeli Castorena PA-C [facility]        Today's Diagnoses     Severe alcohol dependence in early remission (H)    -  1    Alcoholic hepatitis without ascites        Fatty liver        CKD (chronic kidney disease) stage 3, GFR 30-59 ml/min        Aortic valve stenosis, etiology of cardiac valve disease unspecified        Macrocytic anemia        Lymphadenopathy        Mild major depression (H)        Benign hypertension        Visit for screening mammogram        Insomnia, unspecified type           Follow-ups after your visit        Additional Services     MED THERAPY MANAGE REFERRAL       Your provider has referred you to: **[facility] Scheduling (numerous locations) [phone]   [email or web address]    Reason for Referral: Polypharmacy, Liver & renal disease    The [facility] department will contact you to schedule an appointment.  You may also schedule the appointment by calling [phone].  For [facility] [place] patients, please call [phone] to confirm/schedule your appointment on the next business day.    This service is designed to help you get the most from your medications.  A specially trained Pharmacist will work closely with you and your providers to solve any questions, concerns, issues or problems related to your medications.    Please bring all of your prescription and non-prescription medications (such as vitamins, over-the-counter medications, and herbals) or a detailed medication list to your appointment.    If you have a glucose meter or other home monitoring information, please also bring this to your appointment (i.e. blood glucose log,  blood pressure log, pain log, etc.).            MENTAL HEALTH REFERRAL  - Adult; Psychiatry and Medication Management; Psychiatry; [facility]: [facility] Psychiat[facility]   [facility], [place] [phone]  Medication management & future refills will be returned to [facility] PCP upon compl...       All scheduling is subject to the client's specific insurance plan & benefits, provider/location availability, and provider clinical specialities.  Please arrive 15 minutes early for your first appointment and bring your completed paperwork.    Please be aware that coverage of these services is subject to the terms and limitations of your health insurance plan.  Call member services at your health plan with any benefit or coverage questions.                            Your next 10 appointments already scheduled     Mar 26, 2018  1:30 PM CDT   Return Visit with  Oncology Nurse   [facility] ([facility])    [facility]  [address]  [facility] [address] [phone]            Mar 26, 2018  2:00 PM CDT   CT PELVIS W CONTRAST with SHCT1   [facility] ([facility])    [address] [address] [phone]           Please bring any scans or X-rays taken at other hospitals, if similar tests were done. Also bring a list of your medicines, including vitamins, minerals and over-the-counter drugs. It is safest to leave personal items at home.  Be sure to tell your doctor:   If you have any allergies.   If there s any chance you are pregnant.   If you are breastfeeding.   If you have any special needs.  You may have contrast for this exam. To prepare:   Do not eat or drink for 2 hours before your exam. If you need to take medicine, you may take it with small sips of water. (We may ask you to take liquid medicine as well.)   The day before your exam, drink extra fluids at least six 8-ounce glasses (unless your doctor  tells you to restrict your fluids).  Patients over 70 or patients with diabetes or kidney problems:   If you haven t had a blood test (creatinine test) within the last 30 days, go to your clinic or Diagnostic Imaging Department for this test.  If you have diabetes:   If your kidney function is normal, continue taking your metformin (Avandamet, Glucophage, Glucovance, Metaglip) on the day of your exam.   If your kidney function is abnormal, wait 48 hours before restarting this medicine.  You will have oral contrast for this exam:   You will drink the contrast at home. Get this from your clinic or Diagnostic Imaging Department. Please follow the directions given.  Please wear loose clothing, such as a sweat suit or jogging clothes. Avoid snaps, zippers and other metal. We may ask you to undress and put on a hospital gown.  If you have any questions, please call the Imaging Department where you will have your exam.            Mar 29, 2018  2:00 PM CDT   Return Visit with Nahum Pa MD   [facility] ([facility])    [facility]  [address] [facility] [address]  [facility] [address]   [phone]            Apr 26, 2018  1:15 PM CDT   (Arrive by 1:00 PM)   New Visit with Jessica Kramer NP   [facility]    [address]  [address]  [facility] [address] [phone]              Future tests that were ordered for you today     Open Future Orders        Priority Expected Expires Ordered    *MA Screening Digital Bilateral Routine  2/5/2019 2/5/2018            Who to contact     If you have questions or need follow up information about today's clinic visit or your schedule please contact [facility] directly at [phone].  Normal or non-critical lab and imaging results will be communicated to you by MyChart, letter or phone within 4 business days after the clinic has received the results. If you  "do not hear from us within 7 days, please contact the clinic through [employer] or phone. If you have a critical or abnormal lab result, we will notify you by phone as soon as possible.  Submit refill requests through [employer] or call your pharmacy and they will forward the refill request to us. Please allow 3 business days for your refill to be completed.          Additional Information About Your Visit        [employer] Information     [employer] gives you secure access to your electronic health record. If you see a primary care provider, you can also send messages to your care team and make appointments. If you have questions, please call your primary care clinic.  If you do not have a primary care provider, please call [phone] and they will assist you.        Care EveryWhere ID     This is your Care EveryWhere ID. This could be used by other organizations to access your [place] medical records  [id number]        Your Vitals Were     Pulse Temperature Height Pulse Oximetry BMI (Body Mass Index)       113 98.6  F (37  C) (Tympanic) 5' 4\" (1.626 m) 96% 31.67 kg/m2        Blood Pressure from Last 3 Encounters:   02/05/18 132/78   09/28/17 136/82   09/20/17 130/83    Weight from Last 3 Encounters:   02/05/18 184 lb 8 oz (83.7 kg)   09/28/17 182 lb 9.6 oz (82.8 kg)   09/20/17 181 lb (82.1 kg)              We Performed the Following     Basic metabolic panel  (Ca, Cl, CO2, Creat, Gluc, K, Na, BUN)     CBC with platelets     Folate     GGT     Hepatic panel (Albumin, ALT, AST, Bili, Alk Phos, TP)     Magnesium     MED THERAPY MANAGE REFERRAL     MENTAL HEALTH REFERRAL  - Adult; Psychiatry and Medication Management; Psychiatry; [facility]: Collaborative Care Psychiatry Service   [place], [place] [phone]  Medication management & future refills will be returned to [facility] PCP upon compl...     TSH with free T4 reflex     Vitamin B12     Vitamin D Deficiency          Today's Medication Changes          These changes are " accurate as of 2/5/18  4:18 PM.  If you have any questions, ask your nurse or doctor.               These medicines have changed or have updated prescriptions.        Dose/Directions    furosemide 20 MG tablet   Commonly known as:  LASIX   This may have changed:  See the new instructions.   Used for:  Benign hypertension   Changed by:  Nayeli Castorena PA-C        Dose:  20 mg   Take 1 tablet (20 mg) by mouth as needed (taking ~ 1x/weekly as needed for swelling)   Quantity:  90 tablet   Refills:  1            Where to get your medicines      These medications were sent to [facility]/pharmacy #[id number] - [place], [place] - [address] [place] [address]  [address] [place] [address]     Phone:  [phone]     traZODone 100 MG tablet                Primary Care Provider Office Phone # Fax #    Alison Pena [phone]       [address]  [place] [address]        Equal Access to Services     [facility]: Hadii aad ku hadasho Soomaali, waaxda luqadaha, qaybta kaalmada adeegyada, waxay idiin hayaan alan kharakelly vides . So [facility] [place] [facility] [phone].    ATENCIÓN: Si habla español, tiene a gaytan disposición servicios gratuitos de asistencia lingüística. [facility] [phone].    We comply with applicable federal civil rights laws and [place] laws. We do not discriminate on the basis of race, color, national origin, age, disability, sex, sexual orientation, or gender identity.            Thank you!     Thank you for choosing [facility]  for your care. Our goal is always to provide you with excellent care. Hearing back from our patients is one way we can continue to improve our services. Please take a few minutes to complete the written survey that you may receive in the mail after your visit with us. Thank you!             Your Updated Medication List - Protect others around you: Learn how to safely use, store and throw away your medicines at www.disposemymeds.org.           This list is accurate as of 2/5/18  4:18 PM.  Always use your most recent med list.                   Brand Name Dispense Instructions for use Diagnosis    CENTRUM SILVER per tablet      Take 1 tablet by mouth daily        folic acid 1 MG tablet    FOLVITE    30 tablet    Take 1 tablet (1 mg) by mouth daily    Alcohol abuse       furosemide 20 MG tablet    LASIX    90 tablet    Take 1 tablet (20 mg) by mouth as needed (taking ~ 1x/weekly as needed for swelling)    Benign hypertension       gabapentin 300 MG capsule    NEURONTIN    270 capsule    Take 1 capsule (300 mg) by mouth 3 times daily    Chronic pain syndrome       magnesium oxide 400 MG tablet    MAG-OX    7 tablet    Take 1 tablet (400 mg) by mouth daily    Low magnesium levels       melatonin 3 MG tablet      Take 2 tablets (6 mg) by mouth nightly as needed for sleep        mirtazapine 7.5 MG Tabs tablet    REMERON    90 tablet    Take 1 tablet (7.5 mg) by mouth At Bedtime    Depression, unspecified depression type       polyethylene glycol Packet    MIRALAX/GLYCOLAX    7 packet    Take 17 g by mouth daily as needed (constipation)    Constipation, unspecified constipation type       senna-docusate 8.6-50 MG per tablet    SENOKOT-S;PERICOLACE    100 tablet    Take 1-2 tablets by mouth 2 times daily as needed for constipation    Constipation, unspecified constipation type       spironolactone 100 MG tablet    ALDACTONE     100 mg daily        sucralfate 1 GM/10ML suspension    CARAFATE    1200 mL    Take 10 mLs (1 g) by mouth 4 times daily (before meals and nightly) Pt takes once daily    Esophagitis       temazepam 15 MG capsule    RESTORIL    30 capsule    TAKE 1 CAPSULE BY MOUTH AT BEDTIME AS NEEDED TO SLEEP. MUST LAST 30 DAYS.    Thrombocytopenia (H), Liver disease, chronic, due to alcohol (H)       thiamine 100 MG tablet      Take 1 tablet (100 mg) by mouth daily    Alcohol abuse       traZODone 100 MG tablet    DESYREL    90 tablet    Take 1  tablet (100 mg) by mouth nightly as needed for sleep    Insomnia, unspecified type       valACYclovir 1000 mg tablet    VALTREX    4 tablet    TAKE 2 TABS EVERY 12 HRS FOR 1 DAY ONLY FOR OUTBREAKS OF COLD SORES as needed    Herpes simplex virus infection       venlafaxine 37.5 MG tablet    EFFEXOR    180 tablet    TAKE 1 TABLET (37.5 MG) BY MOUTH 2 TIMES DAILY OFFICE VISIT REQUIRED FOR FURTHER FILLS    Symptomatic menopausal or female climacteric states

## 2018-02-05 NOTE — PROGRESS NOTES
SUBJECTIVE:   Kavitha Headley is a 72 year old female who presents to clinic today for the following health issues:    Cardiac History  Kavitha is followed by Dr. Isbell for her history of aortic stenosis. Her recommendation on her last visit was to follow up in August 2018 for a repeat echocardiogram. She also recommended avoiding activities or medication that would stimulate vasodilation.     Lymphadenopathy  Kavitha is followed by Dr. Pa of Heme/Onc. She was originally seen by him for evaluation/treatment of thrombocytopenia secondary to alcohol abuse.  She also was suspected to have a very mild case of DIC during her 6/2017 hospitalization.  HIT antibodies were negative.  Her thrombocytopenia has since resolved but on a follow up CT it was found that she had inguinal lymph adenopathy that Dr. Pa believes to be reactive lymphadenopathy. His recommendation on 09/28/17 was to follow up in 6 months for a repeat CT scan to reevaluate the lymph nodes. She denies a family history of Lymphoma.  She has persistent macrocytic anemia on her last labs.  She is s/p gastric bypass and does not currently take a B12 supplement.    Mental Health  She is very frustrated regarding her mental health.  During her hospitalization she was decreased from mirtazapine 45 mg to 15 mg due to her liver function abnormalities/alcoholic hepatitis.  It was further lowered to 7.5 mg nightly by Dr. Pena in July.  She is very frustrated with this change to her longstanding medication that was prescribed by her psychiatrist (who retired in 2014).  She was prescribed Brintellix in the hospital but it is unclear if this was covered by insurance or if it worked well for the patient.   She continues to take temazepam nightly for sleep as well (this was lowered from 30 mg to 15 mg a few years ago - she has declined sleep evaluations to date).       Kavitha has been prescribed venlafaxine 37.5 mg BID to treat her symptoms of  "anxiety/depression since August.She reports that the medication has not been working well and she does not note any changes with the medication. She states that she often feels depressed and has trouble with motivation and getting out of bed.  She is also prescribed trazodone 100mg to aid in her insomnia.  She is very frustrated that she cannot go back on her previous regimen due to her hepatic history as this was her \"status quo\" and worked well for her.    PHQ-9 SCORE 12/12/2016 8/14/2017 2/5/2018   Total Score - - -   Total Score 4 2 2     NITHIN-7 SCORE 7/14/2017 8/14/2017 2/5/2018   Total Score - - -   Total Score 4 1 1     Alcoholism/Alcoholic Hepatitis  Kavitha has a PMH significant for alcoholism and alcoholic hepatitis. She states that she has been sober since June 1st but she had been drinking up to 1 pint of hard liquor per day prior to her sobriety. She attends AA meetings twice per week to help her sobriety. She is following with Dr. Graham of GI to monitor her liver functioning. He has prescribed her sucralfate and spironolactone to treat her GI symptoms. She states that her liver functioning and imaging has been improving since she was hospitalized in June of 2018. After her hospitalization she was at a TCU from 6/16/2017-6/27/2017 [facility].    Rex Plummer reports that she has had itchy patches on her scalp in the last 2 weeks. She relates that her boyfriends cat had ringworm and that her boyfriend did as well.     Problem list and histories reviewed & adjusted, as indicated.  Additional history: as documented    Patient Active Problem List   Diagnosis     Hyperlipidemia LDL goal <130     Spinal stenosis     Chronic insomnia     Alcohol abuse     CKD (chronic kidney disease) stage 3, GFR 30-59 ml/min     Hip joint replacement status     Knee joint replacement status     Chronic pain syndrome     Bariatric surgery status     Personal history of urinary " calculi     Macrocytic anemia     Anxiety     Aortic stenosis     Left-sided low back pain without sciatica     ACP (advance care planning)     PAC (premature atrial contraction)     Gastroesophageal reflux disease, esophagitis presence not specified     Controlled substance agreement signed     Seasonal affective disorder (H)     HTN, goal below 140/90     Bilateral lower leg cellulitis     Hypokalemia     Hyponatremia     Cellulitis     Depression, unspecified depression type     Severe alcohol dependence in early remission (H)     Past Surgical History:   Procedure Laterality Date     APPENDECTOMY  3/2004    incidental     C GASTRIC BYPASS,OBESE<100CM ARIANNA-EN-Y  1996     C MEDIASTINOSCOPY W OR WO BIOPSY  2/2008    Videomediastinoscopy and, for mediastinal adenopathy -reactive lymphoid hyperplasia     C REPAIR OF RECTOCELE  3/2012     C TOTAL KNEE ARTHROPLASTY  12/2005    left      CARPAL TUNNEL RELEASE RT/LT  10/2010    Carpometacarpal excisional arthroplasty with a fascial autograft and APL suspension sling (99014). 2. Left thumb metacarpophalangeal joint fusion with autologous bone graft (23833). 3. Left endoscopic carpal tunnel release      CHOLECYSTECTOMY, LAPOROSCOPIC  11/2010    Cholecystectomy, Laparoscopic     COLONOSCOPY N/A 9/8/2016    Procedure: COMBINED COLONOSCOPY, SINGLE OR MULTIPLE BIOPSY/POLYPECTOMY BY BIOPSY;  Surgeon: Moe Barlow MD;  Location:  GI     CYSTOCELE REPAIR  11/2012    davinc laparoscopic sacrocolpopexy, enterocele repair, lysis of adhesions, placement of retropubic mid urethral sling, cystoscopy     CYSTOSCOPY, LITHOTRIPSY, COMBINED  6/2006    Left extracorporeal shock wave lithotripsy, cystoscopy, left ureteral stent placement.     CYSTOSCOPY, REMOVE STENT(S), COMBINED  7/2006    Cystoscopy, removal of left ureteral stent, retrograde pyelography, flexible and rigid ureteroscopy and holmium laser lithotripsy, basket removal of stone fragments, ureteral stent placement.       ENDOSCOPIC ULTRASOUND UPPER GASTROINTESTINAL TRACT (GI) N/A 6/12/2017    Procedure: ENDOSCOPIC ULTRASOUND, ESOPHAGOSCOPY / UPPER GASTROINTESTINAL TRACT (GI);  ENDOSCOPIC ULTRASOUND, ESOPHAGOSCOPY / UPPER GASTROINTESTINAL TRACT (GI);  Surgeon: Parth Graham MD;  Location:  GI     HERNIA REPAIR  4/2012    bilateral augmentation mastopexy, ventral hernia repair, and medial thigh liposuction on 04/06/2012.      HYSTERECTOMY VAGINAL, BILATERAL SALPINGO-OOPHERECTOMY, COMBINED  1998    due to myoma and bleeding     JOINT REPLACEMENT, HIP RT/LT  4/2004    right total hip arthroplasty     LAPAROTOMY, LYSIS ADHESIONS, COMBINED  3/2004    lysis adhesions, ventral hernia repair, appendectomy incidentally     LYMPH NODE BIOPSY  4/2008    right axillary, reactive follicular and paracortical hyperplasia.     MAMMOPLASTY AUGMENTATION BILATERAL  4/2012     REVISE RECONSTRUCTED BREAST  6/7/2012    Left breast capsulotomy.        Social History   Substance Use Topics     Smoking status: Never Smoker     Smokeless tobacco: Never Used     Alcohol use No      Comment: none     Family History   Problem Relation Age of Onset     Substance Abuse Father      CANCER Father      throat and lung mets     DIABETES No family hx of      Coronary Artery Disease No family hx of      CEREBROVASCULAR DISEASE No family hx of          Current Outpatient Prescriptions   Medication Sig Dispense Refill     furosemide (LASIX) 20 MG tablet Take 1 tablet (20 mg) by mouth as needed (taking ~ 1x/weekly as needed for swelling) 90 tablet 1     traZODone (DESYREL) 100 MG tablet Take 1 tablet (100 mg) by mouth nightly as needed for sleep 90 tablet 1     gabapentin (NEURONTIN) 300 MG capsule Take 1 capsule (300 mg) by mouth 3 times daily 270 capsule 1     venlafaxine (EFFEXOR) 37.5 MG tablet TAKE 1 TABLET (37.5 MG) BY MOUTH 2 TIMES DAILY OFFICE VISIT REQUIRED FOR FURTHER FILLS 180 tablet 0     temazepam (RESTORIL) 15 MG capsule TAKE 1 CAPSULE BY MOUTH AT  BEDTIME AS NEEDED TO SLEEP. MUST LAST 30 DAYS. 30 capsule 5     sucralfate (CARAFATE) 1 GM/10ML suspension Take 10 mLs (1 g) by mouth 4 times daily (before meals and nightly) Pt takes once daily 1200 mL      spironolactone (ALDACTONE) 100 MG tablet 100 mg daily  3     melatonin 3 MG tablet Take 2 tablets (6 mg) by mouth nightly as needed for sleep       mirtazapine (REMERON) 7.5 MG TABS tablet Take 1 tablet (7.5 mg) by mouth At Bedtime 90 tablet 1     folic acid (FOLVITE) 1 MG tablet Take 1 tablet (1 mg) by mouth daily 30 tablet      thiamine 100 MG tablet Take 1 tablet (100 mg) by mouth daily       magnesium oxide (MAG-OX) 400 MG tablet Take 1 tablet (400 mg) by mouth daily 7 tablet      polyethylene glycol (MIRALAX/GLYCOLAX) Packet Take 17 g by mouth daily as needed (constipation) 7 packet      senna-docusate (SENOKOT-S;PERICOLACE) 8.6-50 MG per tablet Take 1-2 tablets by mouth 2 times daily as needed for constipation 100 tablet      valACYclovir (VALTREX) 1000 mg tablet TAKE 2 TABS EVERY 12 HRS FOR 1 DAY ONLY FOR OUTBREAKS OF COLD SORES as needed 4 tablet 3     Multiple Vitamins-Minerals (CENTRUM SILVER) per tablet Take 1 tablet by mouth daily       [DISCONTINUED] furosemide (LASIX) 20 MG tablet TAKE 1 TABLET (20 MG) BY MOUTH DAILY 90 tablet 1     [DISCONTINUED] venlafaxine (EFFEXOR) 37.5 MG tablet Take 1 tablet (37.5 mg) by mouth 2 times daily 60 tablet 1     [DISCONTINUED] furosemide (LASIX) 20 MG tablet Take 1 tablet (20 mg) by mouth as needed 30 tablet      [DISCONTINUED] traZODone (DESYREL) 100 MG tablet Take 1 tablet (100 mg) by mouth nightly as needed for sleep 90 tablet 1     Allergies   Allergen Reactions     Bactrim [Sulfamethoxazole W/Trimethoprim] Hives     Codeine Itching     NAUSEA     Morphine Itching     NAUSEA       Reviewed and updated as needed this visit by clinical staff  Tobacco  Allergies  Meds  Problems  Med Hx  Surg Hx  Fam Hx  Soc Hx        Reviewed and updated as needed this  "visit by Provider  Tobacco  Allergies  Meds  Problems  Med Hx  Surg Hx  Fam Hx  Soc Hx          ROS:  Constitutional, HEENT, cardiovascular, pulmonary, GI, , musculoskeletal, neuro, skin, endocrine and psych systems are negative, except as otherwise noted.    This document serves as a record of the services and decisions personally performed and made by Nayeli Castorena PA-C. It was created on her behalf by Valente Galvez, a trained medical scribe. The creation of this document is based on the provider's statements to the medical scribe.  Valente Galvez 12:22 PM February 5, 2018    OBJECTIVE:   /78  Pulse 113  Temp 98.6  F (37  C) (Tympanic)  Ht 5' 4\" (1.626 m)  Wt 184 lb 8 oz (83.7 kg)  SpO2 96%  BMI 31.67 kg/m2  Body mass index is 31.67 kg/(m^2).  GENERAL: healthy, alert and no distress  RESP: lungs clear to auscultation - no rales, rhonchi or wheezes  CV: III/VI systolic ejection murmur, otherwise regular rate and rhythm, normal S1 S2, no S3 or S4, no murmur, click or rub, no peripheral edema and peripheral pulses strong  SKIN: scaly patches scattered throughout scalp, no evidence of hair loss, otherwise no suspicious lesions or rashes  PSYCH: mentation appears normal, affect normal/bright    Diagnostic Test Results:  Results for orders placed or performed in visit on 02/05/18 (from the past 24 hour(s))   CBC with platelets   Result Value Ref Range    WBC 5.9 4.0 - 11.0 10e9/L    RBC Count 3.62 (L) 3.8 - 5.2 10e12/L    Hemoglobin 11.9 11.7 - 15.7 g/dL    Hematocrit 35.6 35.0 - 47.0 %    MCV 98 78 - 100 fl    MCH 32.9 26.5 - 33.0 pg    MCHC 33.4 31.5 - 36.5 g/dL    RDW 16.9 (H) 10.0 - 15.0 %    Platelet Count 300 150 - 450 10e9/L       ASSESSMENT/PLAN:   Kavitha was seen today for establish care.    Diagnoses and all orders for this visit:    Severe alcohol dependence in early remission (H), Alcoholic hepatitis without ascites, Fatty liver  Stable, patient has been sober since June " 2017. Following with Dr. Graham of GI. Will monitor Hepatic functioning today and update patient accordingly.   -     Hepatic panel (Albumin, ALT, AST, Bili, Alk Phos, TP)  -     GGT  -     CBC with platelets  -     Vitamin B12  -     Folate  -     Magnesium    CKD (chronic kidney disease) stage 3, GFR 30-59 ml/min, Benign hypertension  Will monitor labs for current kidney functioning.   -     Basic metabolic panel  (Ca, Cl, CO2, Creat, Gluc, K, Na, BUN)    Aortic valve stenosis, etiology of cardiac valve disease unspecified  Stable. Cardiology recommended repeat echo in August of 2018 to reevaluate stenosis. Patient follows with Dr. Isbell.     Macrocytic anemia, Lymphadenopathy  Stable. Heme/Onc recommended repeat CT in March of 2018 to reevaluate lymphadenopathy. Patient follows with Dr. Pa.  -     CBC with platelets    Mild major depression (H)  Stable, will consult MTM and Psych for possible replacement medication for mirtazapine. Due to the hepatic side effects of the medication I recommended discontinue the medication but she relates it has worked well for her. Will update patient with recommendations of new medication.   Consider Brintellix (unsure if this was taken post hospitalization).  Psychiatry referral placed today as well.  -     Basic metabolic panel  (Ca, Cl, CO2, Creat, Gluc, K, Na, BUN)  -     Vitamin D Deficiency  -     TSH with free T4 reflex    Visit for screening mammogram  Mammogram ordered for future date. Patient will schedule.  -     *MA Screening Digital Bilateral; Future    Insomnia, unspecified type  Stable, continue prescription of trazodone to treat insomnia.  -     traZODone (DESYREL) 100 MG tablet; Take 1 tablet (100 mg) by mouth nightly as needed for sleep    - DUE to patients extensive medication list, recommended follow up with MTM for evaluation of possible interactions of medications and how they might be affecting her.     Greater than 45 minutes were spent with the  patient. The majority of this time was coordinating care and counseling regarding the above diagnosis .    The information in this document, created by the medical scribe for me, accurately reflects the services I personally performed and the decisions made by me. I have reviewed and approved this document for accuracy prior to leaving the patient care area.  February 5, 2018 12:22 PM    Nayeli Castorena PA-C  [facility]

## 2018-02-05 NOTE — NURSING NOTE
"Chief Complaint   Patient presents with     Establish Care       Initial /78  Pulse 113  Temp 98.6  F (37  C) (Tympanic)  Ht 5' 4\" (1.626 m)  Wt 184 lb 8 oz (83.7 kg)  SpO2 96%  BMI 31.67 kg/m2 Estimated body mass index is 31.67 kg/(m^2) as calculated from the following:    Height as of this encounter: 5' 4\" (1.626 m).    Weight as of this encounter: 184 lb 8 oz (83.7 kg).  Medication Reconciliation: complete    "

## 2018-02-06 PROBLEM — E53.8 VITAMIN B12 DEFICIENCY: Status: ACTIVE | Noted: 2018-02-06

## 2018-02-06 LAB — DEPRECATED CALCIDIOL+CALCIFEROL SERPL-MC: 76 UG/L (ref 20–75)

## 2018-02-06 RX ORDER — TEMAZEPAM 7.5 MG/1
7.5 CAPSULE ORAL
Qty: 30 CAPSULE | Refills: 1 | Status: SHIPPED | OUTPATIENT
Start: 2018-02-27 | End: 2018-02-27

## 2018-02-06 ASSESSMENT — ANXIETY QUESTIONNAIRES: GAD7 TOTAL SCORE: 1

## 2018-02-06 ASSESSMENT — PATIENT HEALTH QUESTIONNAIRE - PHQ9: SUM OF ALL RESPONSES TO PHQ QUESTIONS 1-9: 2

## 2018-02-06 NOTE — TELEPHONE ENCOUNTER
Called # [phone] (home)     MTM meeting 2/8/2018   Psychiatry not until 4/25/2018 -     Pt stated she is more than willing to try Brintellix - does not recall being on it   pharmacy pended      Pt also stated she is having twinging pain in the right breast and she is due for a mammogram and wondering if she can just get the US done     Ryann Harrison RN, BSN  [place] [facility]

## 2018-02-06 NOTE — TELEPHONE ENCOUNTER
Please advise patient that her liver function studies show changes that are concerning for recent alcohol use due.  Please have her make an appointment with me and ask her to bring her daughter with her to discuss these in detail.     I also discussed this with Dr. Graham who will be reaching out to her to make a follow up appointment.    Her B12 levels are low - please ask her to start a daily supplement of 1000 mcg OTC sublingual B12.    Regarding her concern for memory adding another medication to prevent is only indicated when you are not on a medication that can cause memory issues.  In reviewing her chart with Dr. Graham we need to decrease her dosage of temazepam to 7.5 mg from 15 mg due to the memory issues as well as the effect on the liver.  This dosage will be in effect for her next fill on 2/27 for her temazepam.      Nayeli Castorena, MS, PA-C

## 2018-02-06 NOTE — TELEPHONE ENCOUNTER
Called # [phone] (home)      Advised pt on the information below - pt stated she will pick it up once the pharmacy calls    Pt was discussing with daughter about getting on metformin for helping prevent/decrease chances of alzheimer/demintia and would like to go on this. - pt does not have a family history of this. She would like to go on this for phycological reasons also with her history of alcoholism going on metformin to help preserve her memory would be a good idea     Please review and advise     Ryann Harrison RN, BSN  [facility]

## 2018-02-06 NOTE — TELEPHONE ENCOUNTER
Sent over RX for brintellix.  This would replace her venlafaxine.      Nayeli Castorena MS, PA-C

## 2018-02-06 NOTE — TELEPHONE ENCOUNTER
Called # [phone] (home)     Advised pt on the information below     Pt stated she is not happy about  The changes she needs the temazepam for sleep and and decreasing it will cause her not to sleep, RN did review that this medication can cause memory problems as with it can cause problems - she needs to come in for an OV with her daughter     Pt stated she will call clinic back to schedule when she knows her daughters schedule     rx mailed to: [facility]/pharmacy #[id number] - [place], [place] - [address] AT [place] [address]    Ryann Harrison RN, BSN  [facility]

## 2018-02-06 NOTE — PROGRESS NOTES
Linda  Here are your recent results.  Please make an appointment to discuss your liver function abnormalities that are typically seen with alcohol use.  Dr. Graham's office will also be reaching out to you.  I would like to see you in the clinic with your daughter to discuss the next steps.      If you have any questions please do not hesitate to contact our office via phone ([phone]) or MyChart.    Nayeli Castorena MS, PA-C  [facility]

## 2018-02-07 NOTE — TELEPHONE ENCOUNTER
Pt calling stating the cost of the medication   30 days = cost her $300 and a 90 day supply costs her $ 600     This is for her Brintellix    [facility] # [phone] ok [facility] does not have coupon     Please advise     Thank you     Ryann Harrison RN, BSN  [place] [facility]

## 2018-02-08 NOTE — TELEPHONE ENCOUNTER
Sheri-  Could you advise on recommendations with her current liver function?  It appears that she is probably drinking again based on her labs.      Nayeli Castorena MS, PA-C

## 2018-02-10 NOTE — TELEPHONE ENCOUNTER
MTM - Can you advise on a better medication for her with her liver function?  We have had to decrease a lot of her previous medications d/t her drinking.

## 2018-02-12 ENCOUNTER — OFFICE VISIT (OUTPATIENT)
Dept: FAMILY MEDICINE | Facility: CLINIC | Age: 73
End: 2018-02-12
Payer: COMMERCIAL

## 2018-02-12 VITALS
OXYGEN SATURATION: 96 % | WEIGHT: 183.38 LBS | HEIGHT: 64 IN | BODY MASS INDEX: 31.31 KG/M2 | DIASTOLIC BLOOD PRESSURE: 76 MMHG | SYSTOLIC BLOOD PRESSURE: 116 MMHG | TEMPERATURE: 98.1 F | HEART RATE: 117 BPM

## 2018-02-12 DIAGNOSIS — F10.20 SEVERE ALCOHOL DEPENDENCE (H): ICD-10-CM

## 2018-02-12 DIAGNOSIS — F51.04 CHRONIC INSOMNIA: ICD-10-CM

## 2018-02-12 DIAGNOSIS — F41.9 ANXIETY: ICD-10-CM

## 2018-02-12 DIAGNOSIS — B35.0 TINEA CAPITIS: ICD-10-CM

## 2018-02-12 DIAGNOSIS — R21 RASH: Primary | ICD-10-CM

## 2018-02-12 PROBLEM — F10.21 SEVERE ALCOHOL DEPENDENCE IN EARLY REMISSION (H): Status: RESOLVED | Noted: 2018-02-05 | Resolved: 2018-02-12

## 2018-02-12 PROCEDURE — 99214 OFFICE O/P EST MOD 30 MIN: CPT | Performed by: PHYSICIAN ASSISTANT

## 2018-02-12 RX ORDER — CEPHALEXIN 500 MG/1
CAPSULE ORAL
Refills: 0 | COMMUNITY
Start: 2018-02-10 | End: 2018-02-12

## 2018-02-12 RX ORDER — PREDNISONE 20 MG/1
TABLET ORAL
Refills: 0 | COMMUNITY
Start: 2018-02-10 | End: 2018-02-12

## 2018-02-12 RX ORDER — KETOCONAZOLE 20 MG/ML
SHAMPOO TOPICAL
Qty: 120 ML | Refills: 1 | Status: SHIPPED | OUTPATIENT
Start: 2018-02-12 | End: 2019-12-04

## 2018-02-12 RX ORDER — PERMETHRIN 50 MG/G
CREAM TOPICAL
Qty: 60 G | Refills: 1 | Status: SHIPPED | OUTPATIENT
Start: 2018-02-12 | End: 2018-02-22

## 2018-02-12 RX ORDER — PREDNISONE 20 MG/1
TABLET ORAL
Qty: 20 TABLET | Refills: 0 | Status: SHIPPED | OUTPATIENT
Start: 2018-02-12 | End: 2018-06-14

## 2018-02-12 NOTE — Clinical Note
Please help pt schedule with Dr. Graham in [place] for liver follow up.  I would like to see her in 2 weeks for recheck of depression and liver function.

## 2018-02-12 NOTE — MR AVS SNAPSHOT
After Visit Summary   2/12/2018    Kavitha Headley    MRN: [id number]           Patient Information     Date Of Birth          1945        Visit Information        Provider Department      2/12/2018 6:00 PM Nayeli Castorena PA-C [facility] [place]        Today's Diagnoses     Rash    -  1    Tinea capitis           Follow-ups after your visit        Your next 10 appointments already scheduled     Feb 13, 2018  2:00 PM CST   SHORT with Sheri Elizondo [facility] [place]    [address] [facility] [facility] [address]   [phone]            Mar 26, 2018  1:30 PM CDT   Return Visit with  Oncology Nurse   [facility] ([facility])    [facility]  [address] [facility] [address]  [facility] [address]   [phone]            Mar 26, 2018  2:00 PM CDT   CT PELVIS W CONTRAST with SHCT1   [facility] ([facility])    [address] [facility] [address]   [phone]           Please bring any scans or X-rays taken at other hospitals, if similar tests were done. Also bring a list of your medicines, including vitamins, minerals and over-the-counter drugs. It is safest to leave personal items at home.  Be sure to tell your doctor:   If you have any allergies.   If there s any chance you are pregnant.   If you are breastfeeding.  You may have contrast for this exam. To prepare:   Do not eat or drink for 2 hours before your exam. If you need to take medicine, you may take it with small sips of water. (We may ask you to take liquid medicine as well.)   The day before your exam, drink extra fluids at least six 8-ounce glasses (unless your doctor tells you to restrict your fluids).   You will be given instructions on how to drink the contrast.  Patients over 70 or patients with diabetes or kidney problems:   If you haven t had a blood test (creatinine  test) within the last 30 days, the Cardiologist/Radiologist may require you to get this test prior to your exam.  If you have diabetes:   Continue to take your metformin medication on the day of your exam  Please wear loose clothing, such as a sweat suit or jogging clothes. Avoid snaps, zippers and other metal. We may ask you to undress and put on a hospital gown.  If you have any questions, please call the Imaging Department where you will have your exam.            Mar 29, 2018  2:00 PM CDT   Return Visit with Nahum Pa MD   [facility] ([facility])    [facility]  [address]  [facility] [address] [phone]            Apr 26, 2018  1:15 PM CDT   (Arrive by 1:00 PM)   [facility]   [facility]    [address]  [address]  [facility] [address] [phone]              Who to contact     If you have questions or need follow up information about today's clinic visit or your schedule please contact [facility] directly at [phone].  Normal or non-critical lab and imaging results will be communicated to you by [employer], letter or phone within 4 business days after the clinic has received the results. If you do not hear from us within 7 days, please contact the clinic through [employer] or phone. If you have a critical or abnormal lab result, we will notify you by phone as soon as possible.  Submit refill requests through [employer] or call your pharmacy and they will forward the refill request to us. Please allow 3 business days for your refill to be completed.          Additional Information About Your Visit        [employer] Information     [employer] gives you secure access to your electronic health record. If you see a primary care provider, you can also send messages to your care team and make appointments. If you have questions, please call your  "primary care clinic.  If you do not have a primary care provider, please call [phone] and they will assist you.        Care EveryWhere ID     This is your Care EveryWhere ID. This could be used by other organizations to access your [facility] medical records  [id number]        Your Vitals Were     Pulse Temperature Height Pulse Oximetry BMI (Body Mass Index)       117 98.1  F (36.7  C) (Tympanic) 5' 4\" (1.626 m) 96% 31.48 kg/m2        Blood Pressure from Last 3 Encounters:   02/12/18 116/76   02/05/18 132/78   09/28/17 136/82    Weight from Last 3 Encounters:   02/12/18 183 lb 6 oz (83.2 kg)   02/05/18 184 lb 8 oz (83.7 kg)   09/28/17 182 lb 9.6 oz (82.8 kg)              Today, you had the following     No orders found for display         Today's Medication Changes          These changes are accurate as of 2/12/18  6:38 PM.  If you have any questions, ask your nurse or doctor.               Start taking these medicines.        Dose/Directions    ketoconazole 2 % shampoo   Commonly known as:  NIZORAL   Used for:  Tinea capitis   Started by:  Nayeli Castorena PA-C        Apply to the affected area and wash off after 5 minutes.   Quantity:  120 mL   Refills:  1       permethrin 5 % cream   Commonly known as:  ELIMITE   Used for:  Rash   Started by:  Nayeli Castorena PA-C        Apply cream from head to toe (except the face); leave on for 8-14 hours then wash off with water; reapply in 1 week if live mites appear.   Quantity:  60 g   Refills:  1       predniSONE 20 MG tablet   Commonly known as:  DELTASONE   Used for:  Rash   Started by:  Nayeli Castorena PA-C        Take 60 mg daily for 3 days, then 40 mg daily for 3 days, then 20 mg daily for 3 days, then 10 mg for 4 days   Quantity:  20 tablet   Refills:  0            Where to get your medicines      These medications were sent to [facility]/pharmacy #[id number] - [place], [place] - [address] [place] [address]  [address], [place] [address]    "  Phone:  [phone]     ketoconazole 2 % shampoo    permethrin 5 % cream    predniSONE 20 MG tablet                Primary Care Provider Office Phone # Fax #    Alison Pena [phone]       [address]  [place] [address]        Equal Access to Services     [facility]: Hadii aad ku hadasho Soomaali, waaxda luqadaha, qaybta kaalmada adeegyada, waxay idiin hayaan adeeg khrafaelsh la'aan . So [facility] [phone].    ATENCIÓN: Si habla español, tiene a gaytan disposición servicios gratuitos de asistencia lingüística. Llame al [phone].    We comply with applicable federal civil rights laws and [place] laws. We do not discriminate on the basis of race, color, national origin, age, disability, sex, sexual orientation, or gender identity.            Thank you!     Thank you for choosing [facility]  for your care. Our goal is always to provide you with excellent care. Hearing back from our patients is one way we can continue to improve our services. Please take a few minutes to complete the written survey that you may receive in the mail after your visit with us. Thank you!             Your Updated Medication List - Protect others around you: Learn how to safely use, store and throw away your medicines at www.disposemymeds.org.          This list is accurate as of 2/12/18  6:38 PM.  Always use your most recent med list.                   Brand Name Dispense Instructions for use Diagnosis    B-12 1000 MCG Tbcr     100 tablet    Take 1,000 mcg by mouth daily    Vitamin B12 deficiency       CENTRUM SILVER per tablet      Take 1 tablet by mouth daily        folic acid 1 MG tablet    FOLVITE    30 tablet    Take 1 tablet (1 mg) by mouth daily    Alcohol abuse       furosemide 20 MG tablet    LASIX    90 tablet    Take 1 tablet (20 mg) by mouth as needed (taking ~ 1x/weekly as needed for swelling)    Benign hypertension       gabapentin 300 MG capsule    NEURONTIN    270 capsule     Take 1 capsule (300 mg) by mouth 3 times daily    Chronic pain syndrome       ketoconazole 2 % shampoo    NIZORAL    120 mL    Apply to the affected area and wash off after 5 minutes.    Tinea capitis       magnesium oxide 400 MG tablet    MAG-OX    7 tablet    Take 1 tablet (400 mg) by mouth daily    Low magnesium levels       melatonin 3 MG tablet      Take 2 tablets (6 mg) by mouth nightly as needed for sleep        mirtazapine 7.5 MG Tabs tablet    REMERON    90 tablet    Take 1 tablet (7.5 mg) by mouth At Bedtime    Depression, unspecified depression type       permethrin 5 % cream    ELIMITE    60 g    Apply cream from head to toe (except the face); leave on for 8-14 hours then wash off with water; reapply in 1 week if live mites appear.    Rash       polyethylene glycol Packet    MIRALAX/GLYCOLAX    7 packet    Take 17 g by mouth daily as needed (constipation)    Constipation, unspecified constipation type       predniSONE 20 MG tablet    DELTASONE    20 tablet    Take 60 mg daily for 3 days, then 40 mg daily for 3 days, then 20 mg daily for 3 days, then 10 mg for 4 days    Rash       senna-docusate 8.6-50 MG per tablet    SENOKOT-S;PERICOLACE    100 tablet    Take 1-2 tablets by mouth 2 times daily as needed for constipation    Constipation, unspecified constipation type       spironolactone 100 MG tablet    ALDACTONE     100 mg daily        sucralfate 1 GM/10ML suspension    CARAFATE    1200 mL    Take 10 mLs (1 g) by mouth 4 times daily (before meals and nightly) Pt takes once daily    Esophagitis       temazepam 7.5 MG capsule   Start taking on:  2/27/2018    RESTORIL    30 capsule    Take 1 capsule (7.5 mg) by mouth nightly as needed for sleep (RX must last 30 days)    Thrombocytopenia (H), Liver disease, chronic, due to alcohol (H)       thiamine 100 MG tablet      Take 1 tablet (100 mg) by mouth daily    Alcohol abuse       traZODone 100 MG tablet    DESYREL    90 tablet    Take 1 tablet (100 mg) by  mouth nightly as needed for sleep    Insomnia, unspecified type       valACYclovir 1000 mg tablet    VALTREX    4 tablet    TAKE 2 TABS EVERY 12 HRS FOR 1 DAY ONLY FOR OUTBREAKS OF COLD SORES as needed    Herpes simplex virus infection       vortioxetine 10 MG tablet    BRINTELLIX    90 tablet    Take 1 tablet (10 mg) by mouth daily    Depression, unspecified depression type

## 2018-02-12 NOTE — PROGRESS NOTES
SUBJECTIVE:   Kavitha Headley is a 72 year old female who presents to clinic today for the following health issues:    Rash/Tinea  Patient reports sx have worsened and spread to her chest. [facility] and they started her on Prednisone 20 MG once daily x 3 days (no significant improvement) and Keflex 500 mg BID.  She has been using OTC selsun blue and topical OTC ketoconazole cream on her chest with no significant improvement in either.  She has washed her sheets in hot water     Liver function/ETOH abuse  Upon confronting her about her liver function abnormalities she admits to drinking about 1-2 drinks a week for an undisclosed length of time. She has not followed up with Dr. Graham (hepatologist) as she was expecting them to call her after I discussed her lab results with him.  She is very depressed about not working and having trouble maintaining her sobriety due to this factor.  She was not aware her liver was so friable despite our lengthy previous discussions.    Depression  The patient did  her Brintellix to replace her venlafaxine despite it's high cost and reports that her ability to focus is already improved since starting it.      Problem list and histories reviewed & adjusted, as indicated.  Additional history: as documented    Patient Active Problem List   Diagnosis     Hyperlipidemia LDL goal <130     Spinal stenosis     Chronic insomnia     Alcohol abuse     CKD (chronic kidney disease) stage 3, GFR 30-59 ml/min     Hip joint replacement status     Knee joint replacement status     Chronic pain syndrome     Bariatric surgery status     Personal history of urinary calculi     Macrocytic anemia     Anxiety     Aortic stenosis     Left-sided low back pain without sciatica     ACP (advance care planning)     PAC (premature atrial contraction)     Gastroesophageal reflux disease, esophagitis presence not specified     Controlled substance agreement signed     Seasonal  affective disorder (H)     HTN, goal below 140/90     Bilateral lower leg cellulitis     Hypokalemia     Hyponatremia     Cellulitis     Depression, unspecified depression type     Vitamin B12 deficiency     Severe alcohol dependence (H)     Past Surgical History:   Procedure Laterality Date     APPENDECTOMY  3/2004    incidental     C GASTRIC BYPASS,OBESE<100CM ARIANNA-EN-Y  1996     C MEDIASTINOSCOPY W OR WO BIOPSY  2/2008    Videomediastinoscopy and, for mediastinal adenopathy -reactive lymphoid hyperplasia     C REPAIR OF RECTOCELE  3/2012     C TOTAL KNEE ARTHROPLASTY  12/2005    left      CARPAL TUNNEL RELEASE RT/LT  10/2010    Carpometacarpal excisional arthroplasty with a fascial autograft and APL suspension sling (60254). 2. Left thumb metacarpophalangeal joint fusion with autologous bone graft (21179). 3. Left endoscopic carpal tunnel release      CHOLECYSTECTOMY, LAPOROSCOPIC  11/2010    Cholecystectomy, Laparoscopic     COLONOSCOPY N/A 9/8/2016    Procedure: COMBINED COLONOSCOPY, SINGLE OR MULTIPLE BIOPSY/POLYPECTOMY BY BIOPSY;  Surgeon: Moe Barlow MD;  Location: [facility]     CYSTOCELE REPAIR  11/2012    davinc laparoscopic sacrocolpopexy, enterocele repair, lysis of adhesions, placement of retropubic mid urethral sling, cystoscopy     CYSTOSCOPY, LITHOTRIPSY, COMBINED  6/2006    Left extracorporeal shock wave lithotripsy, cystoscopy, left ureteral stent placement.     CYSTOSCOPY, REMOVE STENT(S), COMBINED  7/2006    Cystoscopy, removal of left ureteral stent, retrograde pyelography, flexible and rigid ureteroscopy and holmium laser lithotripsy, basket removal of stone fragments, ureteral stent placement.      ENDOSCOPIC ULTRASOUND UPPER GASTROINTESTINAL TRACT (GI) N/A 6/12/2017    Procedure: ENDOSCOPIC ULTRASOUND, ESOPHAGOSCOPY / UPPER GASTROINTESTINAL TRACT (GI);  ENDOSCOPIC ULTRASOUND, ESOPHAGOSCOPY / UPPER GASTROINTESTINAL TRACT (GI);  Surgeon: Parth Graham MD;  Location: [facility]     HERNIA  REPAIR  4/2012    bilateral augmentation mastopexy, ventral hernia repair, and medial thigh liposuction on 04/06/2012.      HYSTERECTOMY VAGINAL, BILATERAL SALPINGO-OOPHERECTOMY, COMBINED  1998    due to myoma and bleeding     JOINT REPLACEMENT, HIP RT/LT  4/2004    right total hip arthroplasty     LAPAROTOMY, LYSIS ADHESIONS, COMBINED  3/2004    lysis adhesions, ventral hernia repair, appendectomy incidentally     LYMPH NODE BIOPSY  4/2008    right axillary, reactive follicular and paracortical hyperplasia.     MAMMOPLASTY AUGMENTATION BILATERAL  4/2012     REVISE RECONSTRUCTED BREAST  6/7/2012    Left breast capsulotomy.        Social History   Substance Use Topics     Smoking status: Never Smoker     Smokeless tobacco: Never Used     Alcohol use No      Comment: none     Family History   Problem Relation Age of Onset     Substance Abuse Father      CANCER Father      throat and lung mets     DIABETES No family hx of      Coronary Artery Disease No family hx of      CEREBROVASCULAR DISEASE No family hx of          Current Outpatient Prescriptions   Medication Sig Dispense Refill     permethrin (ELIMITE) 5 % cream Apply cream from head to toe (except the face); leave on for 8-14 hours then wash off with water; reapply in 1 week if live mites appear. 60 g 1     predniSONE (DELTASONE) 20 MG tablet Take 60 mg daily for 3 days, then 40 mg daily for 3 days, then 20 mg daily for 3 days, then 10 mg for 4 days 20 tablet 0     ketoconazole (NIZORAL) 2 % shampoo Apply to the affected area and wash off after 5 minutes. 120 mL 1     [START ON 2/27/2018] temazepam (RESTORIL) 7.5 MG capsule Take 1 capsule (7.5 mg) by mouth nightly as needed for sleep (RX must last 30 days) 30 capsule 1     Cyanocobalamin (B-12) 1000 MCG TBCR Take 1,000 mcg by mouth daily 100 tablet 1     furosemide (LASIX) 20 MG tablet Take 1 tablet (20 mg) by mouth as needed (taking ~ 1x/weekly as needed for swelling) 90 tablet 1     traZODone (DESYREL) 100 MG  tablet Take 1 tablet (100 mg) by mouth nightly as needed for sleep 90 tablet 1     vortioxetine (BRINTELLIX) 10 MG tablet Take 1 tablet (10 mg) by mouth daily 90 tablet 0     gabapentin (NEURONTIN) 300 MG capsule Take 1 capsule (300 mg) by mouth 3 times daily 270 capsule 1     sucralfate (CARAFATE) 1 GM/10ML suspension Take 10 mLs (1 g) by mouth 4 times daily (before meals and nightly) Pt takes once daily 1200 mL      spironolactone (ALDACTONE) 100 MG tablet 100 mg daily  3     melatonin 3 MG tablet Take 2 tablets (6 mg) by mouth nightly as needed for sleep       mirtazapine (REMERON) 7.5 MG TABS tablet Take 1 tablet (7.5 mg) by mouth At Bedtime 90 tablet 1     folic acid (FOLVITE) 1 MG tablet Take 1 tablet (1 mg) by mouth daily 30 tablet      thiamine 100 MG tablet Take 1 tablet (100 mg) by mouth daily       magnesium oxide (MAG-OX) 400 MG tablet Take 1 tablet (400 mg) by mouth daily 7 tablet      polyethylene glycol (MIRALAX/GLYCOLAX) Packet Take 17 g by mouth daily as needed (constipation) 7 packet      senna-docusate (SENOKOT-S;PERICOLACE) 8.6-50 MG per tablet Take 1-2 tablets by mouth 2 times daily as needed for constipation 100 tablet      valACYclovir (VALTREX) 1000 mg tablet TAKE 2 TABS EVERY 12 HRS FOR 1 DAY ONLY FOR OUTBREAKS OF COLD SORES as needed 4 tablet 3     Multiple Vitamins-Minerals (CENTRUM SILVER) per tablet Take 1 tablet by mouth daily       Allergies   Allergen Reactions     Bactrim [Sulfamethoxazole W/Trimethoprim] Hives     Codeine Itching     NAUSEA     Morphine Itching     NAUSEA     Quetiapine        Reviewed and updated as needed this visit by clinical staff  Tobacco  Allergies  Meds  Problems  Med Hx  Surg Hx  Fam Hx  Soc Hx        Reviewed and updated as needed this visit by Provider  Tobacco  Allergies  Meds  Problems  Med Hx  Surg Hx  Fam Hx  Soc Hx          ROS:  Constitutional, HEENT, cardiovascular, pulmonary, GI, , musculoskeletal, neuro, skin, endocrine and psych  "systems are negative, except as otherwise noted.    This document serves as a record of the services and decisions personally performed and made by Nayeli Castorena PA-C. It was created on her behalf by Georgette Cordero, a trained medical scribe. The creation of this document is based the provider's statements to the medical scribe.    Georgette Cordero February 12, 2018 1:33 PM  OBJECTIVE:     /76  Pulse 117  Temp 98.1  F (36.7  C) (Tympanic)  Ht 5' 4\" (1.626 m)  Wt 183 lb 6 oz (83.2 kg)  SpO2 96%  BMI 31.48 kg/m2  Body mass index is 31.48 kg/(m^2).  GENERAL: healthy, alert and no distress  CV: normal rate, rhythym.  RESP: no rales, wheezes  SKIN: multiple pustular maculopapular lesions across upper central chest and breasts; numerous large scaly patches on scalp with no evidence of hair loss   PSYCH: mentation appears normal, affect normal/bright    Diagnostic Test Results:  Results for orders placed or performed in visit on 02/05/18   Hepatic panel (Albumin, ALT, AST, Bili, Alk Phos, TP)   Result Value Ref Range    Bilirubin Direct 0.2 0.0 - 0.2 mg/dL    Bilirubin Total 0.5 0.2 - 1.3 mg/dL    Albumin 4.0 3.4 - 5.0 g/dL    Protein Total 7.5 6.8 - 8.8 g/dL    Alkaline Phosphatase 102 40 - 150 U/L    ALT 62 (H) 0 - 50 U/L    AST 73 (H) 0 - 45 U/L   GGT   Result Value Ref Range     (H) 0 - 40 U/L   Basic metabolic panel  (Ca, Cl, CO2, Creat, Gluc, K, Na, BUN)   Result Value Ref Range    Sodium 136 133 - 144 mmol/L    Potassium 3.9 3.4 - 5.3 mmol/L    Chloride 95 94 - 109 mmol/L    Carbon Dioxide 36 (H) 20 - 32 mmol/L    Anion Gap 5 3 - 14 mmol/L    Glucose 101 (H) 70 - 99 mg/dL    Urea Nitrogen 12 7 - 30 mg/dL    Creatinine 1.00 0.52 - 1.04 mg/dL    GFR Estimate 54 (L) >60 mL/min/1.7m2    GFR Estimate If Black 66 >60 mL/min/1.7m2    Calcium 9.6 8.5 - 10.1 mg/dL   CBC with platelets   Result Value Ref Range    WBC 5.9 4.0 - 11.0 10e9/L    RBC Count 3.62 (L) 3.8 - 5.2 10e12/L    Hemoglobin 11.9 11.7 - 15.7 " g/dL    Hematocrit 35.6 35.0 - 47.0 %    MCV 98 78 - 100 fl    MCH 32.9 26.5 - 33.0 pg    MCHC 33.4 31.5 - 36.5 g/dL    RDW 16.9 (H) 10.0 - 15.0 %    Platelet Count 300 150 - 450 10e9/L   Vitamin B12   Result Value Ref Range    Vitamin B12 198 193 - 986 pg/mL   Folate   Result Value Ref Range    Folate 24.6 >5.4 ng/mL   Magnesium   Result Value Ref Range    Magnesium 1.8 1.6 - 2.3 mg/dL   Vitamin D Deficiency   Result Value Ref Range    Vitamin D Deficiency screening 76 (H) 20 - 75 ug/L   TSH with free T4 reflex   Result Value Ref Range    TSH 1.40 0.40 - 4.00 mU/L         ASSESSMENT/PLAN:   Kavitha was seen today for derm problem.    Diagnoses and all orders for this visit:    Rash - Pt's rash appears to be due to mites of some kind.  Will treat with prednisone long/strong taper and have her treat with permethrin overnight.  Advised to wash all clothes and bedding.  Skin care referral if not improving.  -     permethrin (ELIMITE) 5 % cream; Apply cream from head to toe (except the face); leave on for 8-14 hours then wash off with water; reapply in 1 week if live mites appear.  -     predniSONE (DELTASONE) 20 MG tablet; Take 60 mg daily for 3 days, then 40 mg daily for 3 days, then 20 mg daily for 3 days, then 10 mg for 4 days  -     SKIN CARE REFERRAL    Tinea capitis  Scaly patches consistent with tinea.  Stronger antifungal shampoo indicated.    -     ketoconazole (NIZORAL) 2 % shampoo; Apply to the affected area and wash off after 5 minutes.    Severe alcohol dependence (H), Chronic insomnia, Anxiety  Discussed ETOH and that if she does not stop it will cause probable liver failure.  Pt may need inpatient treatment.  Pt voiced understanding.  I will see her back in 2 weeks.  Plan follow up with Dr. Graham.      Will treat with permethrin to treat a potential mite, use topical scalp cream after wash. Discussed application with patient. Advised to wash bedding and sheets in hot water. Discontinue antibiotics, I do  not suspect this is a bacterial infection. Increase steroid taper up to 60 MG and taper down. Continue with increased strength fungal shampoo after fill body rinse. Advised to follow up if symptoms are not improving or worsening. Dermatology referral. Discussed with patient her liver is fragile and she cannot drink at all for the risk of liver failure; she understands she cannot drink socially, not at all.     The information in this document, created by the medical scribe for me, accurately reflects the services I personally performed and the decisions made by me. I have reviewed and approved this document for accuracy prior to leaving the patient care area.  Nayeli Castorena PA-C  [facility] [place]

## 2018-02-13 ENCOUNTER — TELEPHONE (OUTPATIENT)
Dept: FAMILY MEDICINE | Facility: CLINIC | Age: 73
End: 2018-02-13

## 2018-02-13 NOTE — TELEPHONE ENCOUNTER
Patient advised that per notes from yesterday she was to wash all her bedding and clothes which she has done.  She said itching is better.    RN discussed with LP and she advised protective cover for mattress and spraying with Lysol.    EMILIA Conrad, RN, [facility]) [phone]

## 2018-02-13 NOTE — TELEPHONE ENCOUNTER
Pt was seen yesterday, 2/12, by Nayeli Castorena for a rash. She would like advice on how to prevent it in the future. Pt can be reached at [phone]. Thank you.  Yuly Arroyo,

## 2018-02-13 NOTE — NURSING NOTE
"Chief Complaint   Patient presents with     Derm Problem     Rash f/u has spread. started keflex and prednisone on Saturday for rash.        Initial /76  Pulse 117  Temp 98.1  F (36.7  C) (Tympanic)  Ht 5' 4\" (1.626 m)  Wt 183 lb 6 oz (83.2 kg)  SpO2 96%  BMI 31.48 kg/m2 Estimated body mass index is 31.48 kg/(m^2) as calculated from the following:    Height as of this encounter: 5' 4\" (1.626 m).    Weight as of this encounter: 183 lb 6 oz (83.2 kg).  Medication Reconciliation: complete    "

## 2018-02-20 ENCOUNTER — OFFICE VISIT (OUTPATIENT)
Dept: FAMILY MEDICINE | Facility: CLINIC | Age: 73
End: 2018-02-20
Payer: COMMERCIAL

## 2018-02-20 DIAGNOSIS — L82.1 SEBORRHEIC KERATOSIS: ICD-10-CM

## 2018-02-20 DIAGNOSIS — L30.9 DERMATITIS: ICD-10-CM

## 2018-02-20 DIAGNOSIS — L21.9 SEBORRHEIC DERMATITIS: Primary | ICD-10-CM

## 2018-02-20 PROCEDURE — 99214 OFFICE O/P EST MOD 30 MIN: CPT | Performed by: FAMILY MEDICINE

## 2018-02-20 RX ORDER — TRIAMCINOLONE ACETONIDE 1 MG/G
CREAM TOPICAL
Qty: 80 G | Refills: 0 | Status: SHIPPED | OUTPATIENT
Start: 2018-02-20 | End: 2018-08-03

## 2018-02-20 RX ORDER — BETAMETHASONE DIPROPIONATE 0.5 MG/ML
LOTION, AUGMENTED TOPICAL
Qty: 60 ML | Refills: 1 | Status: SHIPPED | OUTPATIENT
Start: 2018-02-20 | End: 2018-08-03

## 2018-02-20 NOTE — PROGRESS NOTES
[facility] - PRIMARY CARE SKIN    CC : Rash   SUBJECTIVE:                                                    Kavitha Headley is a 72 year old female who presents to clinic today because of a(n) itchy rash beginning 3 weeks ago on the scalp and spreading onto the back. She is concerned about possible insect bites or ringworm. She reports that her fiancee has had similar symptoms (which preceded her onset of symptoms) after exposure to pet cat.    Therapies tried :   Permethrin begun 2/12/18  Prednisone 60 mg, 13 day taper begun 2/12/18 - no improvement of itchiness on chest  Ketoconazole 2% shampoo  Also cephalexin, clotrimazole, OTC antifungal previously  No improvement of itchiness with any therapies.    Pruritic : YES - she may be scratching unconsciously in the night.  Symptoms appear to be : not improving.  Aggravating factors : none identified.  Relieving factors : none identified.    Previous similar history : NO.  Recent exposure history : ?pet cat exposure ?insect bites, no travels preceding onset of rash  Any other family members with similar symptoms : YES - melina had similar symptoms diagnosed as ringworm on scalp and hands (confirmed with scraping test) now resolved.  Products used : ?Curel lotion. Some perfumes used. Suave mint shampoo.    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    Patient Active Problem List   Diagnosis     Hyperlipidemia LDL goal <130     Spinal stenosis     Chronic insomnia     Alcohol abuse     CKD (chronic kidney disease) stage 3, GFR 30-59 ml/min     Hip joint replacement status     Knee joint replacement status     Chronic pain syndrome     Bariatric surgery status     Personal history of urinary calculi     Macrocytic anemia     Anxiety      Aortic stenosis     Left-sided low back pain without sciatica     ACP (advance care planning)     PAC (premature atrial contraction)     Gastroesophageal reflux disease, esophagitis presence not specified     Controlled substance agreement signed     Seasonal affective disorder (H)     HTN, goal below 140/90     Bilateral lower leg cellulitis     Hypokalemia     Hyponatremia     Cellulitis     Depression, unspecified depression type     Vitamin B12 deficiency     Severe alcohol dependence (H)       Past Medical History:   Diagnosis Date     Alcohol abuse     long term alcohol abuse     Anxiety disorder      Bariatric surgery status 1996?    gastric bypass, [facility] and     Benign hypertension      Chronic insomnia      Chronic pain syndrome     Chronic back and neck pain, chronic pain due to osteoarthritis multiple joints     Coronary artery disease 9/2015    mild distal branch disease on cath     Hip joint replacement status 4/2004    right     Knee joint replacement status 12/2005    left     Macrocytic anemia     Mild macrocytic anemia, 2012 to present, likely based on alcohol abuse.     Major depressive disorder, single episode, severe, without mention of psychotic behavior      Mixed hyperlipidemia      Moderate aortic stenosis 5/2014    mild/mod AS with peak gradient 33/mean gradient 20 mmHg, AV area 1.2     Pelvic relaxation disorder     Surgical intervention for cystocele/rectocele 3,11/2012     Personal history of urinary calculi 6/2006    left ureteral stone,lithotripsy     PVC (premature ventricular contraction)      Stage III chronic kidney disease 2005     SVT (supraventricular tachycardia) (H)     likely atrial tachycardia    Past Surgical History:   Procedure Laterality Date     APPENDECTOMY  3/2004    incidental     C GASTRIC BYPASS,OBESE<100CM ARIANNA-EN-Y  1996     C MEDIASTINOSCOPY W OR WO BIOPSY  2/2008    Videomediastinoscopy and, for mediastinal adenopathy -reactive lymphoid hyperplasia     C  REPAIR OF RECTOCELE  3/2012     C TOTAL KNEE ARTHROPLASTY  12/2005    left      CARPAL TUNNEL RELEASE RT/LT  10/2010    Carpometacarpal excisional arthroplasty with a fascial autograft and APL suspension sling (35892). 2. Left thumb metacarpophalangeal joint fusion with autologous bone graft (94679). 3. Left endoscopic carpal tunnel release      CHOLECYSTECTOMY, LAPOROSCOPIC  11/2010    Cholecystectomy, Laparoscopic     COLONOSCOPY N/A 9/8/2016    Procedure: COMBINED COLONOSCOPY, SINGLE OR MULTIPLE BIOPSY/POLYPECTOMY BY BIOPSY;  Surgeon: Moe Barlow MD;  Location:  GI     CYSTOCELE REPAIR  11/2012    davinci laparoscopic sacrocolpopexy, enterocele repair, lysis of adhesions, placement of retropubic mid urethral sling, cystoscopy     CYSTOSCOPY, LITHOTRIPSY, COMBINED  6/2006    Left extracorporeal shock wave lithotripsy, cystoscopy, left ureteral stent placement.     CYSTOSCOPY, REMOVE STENT(S), COMBINED  7/2006    Cystoscopy, removal of left ureteral stent, retrograde pyelography, flexible and rigid ureteroscopy and holmium laser lithotripsy, basket removal of stone fragments, ureteral stent placement.      ENDOSCOPIC ULTRASOUND UPPER GASTROINTESTINAL TRACT (GI) N/A 6/12/2017    Procedure: ENDOSCOPIC ULTRASOUND, ESOPHAGOSCOPY / UPPER GASTROINTESTINAL TRACT (GI);  ENDOSCOPIC ULTRASOUND, ESOPHAGOSCOPY / UPPER GASTROINTESTINAL TRACT (GI);  Surgeon: Parth Graham MD;  Location:  GI     HERNIA REPAIR  4/2012    bilateral augmentation mastopexy, ventral hernia repair, and medial thigh liposuction on 04/06/2012.      HYSTERECTOMY VAGINAL, BILATERAL SALPINGO-OOPHERECTOMY, COMBINED  1998    due to myoma and bleeding     JOINT REPLACEMENT, HIP RT/LT  4/2004    right total hip arthroplasty     LAPAROTOMY, LYSIS ADHESIONS, COMBINED  3/2004    lysis adhesions, ventral hernia repair, appendectomy incidentally     LYMPH NODE BIOPSY  4/2008    right axillary, reactive follicular and paracortical hyperplasia.      MAMMOPLASTY AUGMENTATION BILATERAL  4/2012     REVISE RECONSTRUCTED BREAST  6/7/2012    Left breast capsulotomy.       Social History   Substance Use Topics     Smoking status: Never Smoker     Smokeless tobacco: Never Used     Alcohol use No      Comment: none    Family History     Problem (# of Occurrences) Relation (Name,Age of Onset)    CANCER (1) Father: throat and lung mets    Substance Abuse (1) Father       Negative family history of: DIABETES, Coronary Artery Disease, CEREBROVASCULAR DISEASE           Prescription Medications as of 2/20/2018             betamethasone, augmented, (DIPROLENE) 0.05 % lotion Apply to AA on scalp bid for 7-10 days then when needed    triamcinolone (KENALOG) 0.1 % cream Apply to AA on the upper chest and upper back bid for 10-14 days    permethrin (ELIMITE) 5 % cream Apply cream from head to toe (except the face); leave on for 8-14 hours then wash off with water; reapply in 1 week if live mites appear.    predniSONE (DELTASONE) 20 MG tablet Take 60 mg daily for 3 days, then 40 mg daily for 3 days, then 20 mg daily for 3 days, then 10 mg for 4 days    ketoconazole (NIZORAL) 2 % shampoo Apply to the affected area and wash off after 5 minutes.    temazepam (RESTORIL) 7.5 MG capsule Starting on 2/27/2018. Take 1 capsule (7.5 mg) by mouth nightly as needed for sleep (RX must last 30 days)    Cyanocobalamin (B-12) 1000 MCG TBCR Take 1,000 mcg by mouth daily    traZODone (DESYREL) 100 MG tablet Take 1 tablet (100 mg) by mouth nightly as needed for sleep    vortioxetine (BRINTELLIX) 10 MG tablet Take 1 tablet (10 mg) by mouth daily    gabapentin (NEURONTIN) 300 MG capsule Take 1 capsule (300 mg) by mouth 3 times daily    sucralfate (CARAFATE) 1 GM/10ML suspension Take 10 mLs (1 g) by mouth 4 times daily (before meals and nightly) Pt takes once daily    spironolactone (ALDACTONE) 100 MG tablet 100 mg daily    melatonin 3 MG tablet Take 2 tablets (6 mg) by mouth nightly as needed for sleep     folic acid (FOLVITE) 1 MG tablet Take 1 tablet (1 mg) by mouth daily    thiamine 100 MG tablet Take 1 tablet (100 mg) by mouth daily    magnesium oxide (MAG-OX) 400 MG tablet Take 1 tablet (400 mg) by mouth daily    polyethylene glycol (MIRALAX/GLYCOLAX) Packet Take 17 g by mouth daily as needed (constipation)    senna-docusate (SENOKOT-S;PERICOLACE) 8.6-50 MG per tablet Take 1-2 tablets by mouth 2 times daily as needed for constipation    Multiple Vitamins-Minerals (CENTRUM SILVER) per tablet Take 1 tablet by mouth daily    furosemide (LASIX) 20 MG tablet Take 1 tablet (20 mg) by mouth as needed (taking ~ 1x/weekly as needed for swelling)    valACYclovir (VALTREX) 1000 mg tablet TAKE 2 TABS EVERY 12 HRS FOR 1 DAY ONLY FOR OUTBREAKS OF COLD SORES as needed            Allergies   Allergen Reactions     Bactrim [Sulfamethoxazole W/Trimethoprim] Hives     Codeine Itching     NAUSEA     Morphine Itching     NAUSEA     Quetiapine         INTEGUMENTARY/SKIN: POSITIVE for pruritis    ROS : 14 point review of systems was negative except the symptoms listed above in the HPI.    This document serves as a record of the services and decisions personally performed and made by Alethea Singer MD. It was created on her behalf by Braxton Mi, a trained medical scribe.  The creation of this document is based on the scribe's personal observations and the provider's statements to the medical scribe.  Braxton Mi, February 20, 2018 1:49 PM      OBJECTIVE:                                                    GENERAL: healthy, alert and in mild distress  SKIN: Fox Skin Type - II.  Scalp, Face, Neck, Trunk, Arms and Hands were examined. The dermatoscope was used to help evaluate pigmented lesions.  Skin Pertinent Findings:  Back : Multiple excoriated papules confined to upper back and extending onto upper chest.    Posterior occipital scalp : Scaly erythematous plaque, most prominent on occipital scalp.    Right frontal scalp : 3 mm  in size stuck-on appearing papules, raised, brown, coarse-textured, round lesion(s) most consistent with seborrheic keratoses.    Wrists, hands, elbows : Clear    Diagnostic Test Results:  none     MDM : I would still keep scalp psoriasis in the differential .      ASSESSMENT:                                                      Encounter Diagnoses   Name Primary?     Seborrheic keratosis      Seborrheic dermatitis Yes     Dermatitis          PLAN:                                                    Patient Instructions   FUTURE APPOINTMENTS  Follow up in 2 week(s)    TOPICAL STEROID INSTRUCTIONS - for use on scalp  Betamethasone dipropionate 0.05% lotion.    Apply a few drops and rub into the affected areas on the scalp, two times per day for 7-10 days.    Not to be used on face or groin.    After initial treatment, topical steroid may be used as needed for flare-ups.    Topical steroid use is for short-term treatment only. If after initial treatment, you are using this for prolonged periods of time, return to clinic for re-evaluation of treatment.    SHAMPOO INSTRUCTIONS  Ketoconazole 2% shampoo. Wash hair twice a week with this shampoo, applying it to damp skin. Leave on for 5 minutes before rinsing.    Use one of the following shampoos (active ingredients underlined):  Coal Tar shampoos : Neutrogena T/Gel, Denorex, DHS Tar, Ionil-T, Tegrin, X-Seb T, Zetar  Zinc Pyrithione shampoos : Head & Shoulders, Denorex Advance Formula, DHS Zinc, Zincon  Salicylic Acid shampoos : Neutrogena T/Sal, DHS Sal, Ionil, P&S, Sebulex, X-Seb  Selenium Sulfide shampoos : Selsun Blue shampoo  Sulfur shampoos : Sebulex  Ketoconazole : Nizoral 1%  Consider alternating use of shampoos with different active ingredients every 4 week(s).    TOPICAL STEROID INSTRUCTIONS  Triamcinolone 0.1% cream.    Apply an amount equal to half of a fingertip (0.25 g) to the affected area(s) on the chest and upper back, two times per day for 10-14  "days.    \"Fingertip Amount\"      Not to be used on face or groin.    After initial treatment, topical steroid may be used as needed for flare-ups.    Topical steroid use is for short-term treatment only. If after initial treatment, you are using this for prolonged periods of time, return to clinic for re-evaluation of treatment.    Keep in mind to also regularly use moisturizer, as this preventative measure can help maintain your skin's natural moisture barrier.      The patient was counseled to use products free of fragrance, dyes, and plants. The importance of using bland cleansers and the regular use of heavy bland emollient creams was impressed upon the patient.      PROCEDURES:                                                    None.    TT : 20 minutes.  CT : 15 minutes.      The information in this document, created by the medical scribe for me, accurately reflects the services I personally performed and the decisions made by me. I have reviewed and approved this document for accuracy prior to leaving the patient care area.  Alethea Singer MD February 20, 2018 1:49 PM  [facility] [place]      "

## 2018-02-20 NOTE — NURSING NOTE
"Chief Complaint   Patient presents with     Derm Problem     Rash  and possibly bug bites        Initial There were no vitals taken for this visit. Estimated body mass index is 31.48 kg/(m^2) as calculated from the following:    Height as of 2/12/18: 5' 4\" (1.626 m).    Weight as of 2/12/18: 183 lb 6 oz (83.2 kg).  Medication Reconciliation: complete     Ophelia Howard MA     "

## 2018-02-20 NOTE — MR AVS SNAPSHOT
"              After Visit Summary   2/20/2018    Kavitha Headley    MRN: [id number]           Patient Information     Date Of Birth          1945        Visit Information        Provider Department      2/20/2018 2:00 PM Mica Singer MD [facility]        Today's Diagnoses     Seborrheic dermatitis    -  1    Seborrheic keratosis        Dermatitis          Care Instructions    FUTURE APPOINTMENTS  Follow up in 2 week(s)    TOPICAL STEROID INSTRUCTIONS - for use on scalp  Betamethasone dipropionate 0.05% lotion.    Apply a few drops and rub into the affected areas on the scalp, two times per day for 7-10 days.    Not to be used on face or groin.    After initial treatment, topical steroid may be used as needed for flare-ups.    Topical steroid use is for short-term treatment only. If after initial treatment, you are using this for prolonged periods of time, return to clinic for re-evaluation of treatment.    SHAMPOO INSTRUCTIONS  Ketoconazole 2% shampoo. Wash hair twice a week with this shampoo, applying it to damp skin. Leave on for 5 minutes before rinsing.    Use one of the following shampoos (active ingredients underlined):  Coal Tar shampoos : Neutrogena T/Gel, Denorex, DHS Tar, Ionil-T, Tegrin, X-Seb T, Zetar  Zinc Pyrithione shampoos : Head & Shoulders, Denorex Advance Formula, DHS Zinc, Zincon  Salicylic Acid shampoos : Neutrogena T/Sal, DHS Sal, Ionil, P&S, Sebulex, X-Seb  Selenium Sulfide shampoos : Selsun Blue shampoo  Sulfur shampoos : Sebulex  Ketoconazole : Nizoral 1%  Consider alternating use of shampoos with different active ingredients every 4 week(s).    TOPICAL STEROID INSTRUCTIONS  Triamcinolone 0.1% cream.    Apply an amount equal to half of a fingertip (0.25 g) to the affected area(s) on the chest and upper back, two times per day for 10-14 days.    \"Fingertip Amount\"      Not to be used on face or groin.    After initial treatment, topical steroid may be " used as needed for flare-ups.    Topical steroid use is for short-term treatment only. If after initial treatment, you are using this for prolonged periods of time, return to clinic for re-evaluation of treatment.    Keep in mind to also regularly use moisturizer, as this preventative measure can help maintain your skin's natural moisture barrier.          Follow-ups after your visit        Your next 10 appointments already scheduled     Feb 22, 2018  1:30 PM CST   SHORT with Sheri Elizondo [facility] ([facility])    [address] [address]   [phone]            Mar 26, 2018  1:30 PM CDT   Return Visit with  Oncology Nurse   [facility] ([facility])    [facility]  [address]  [facility] [address]   [phone]            Mar 26, 2018  2:00 PM CDT   CT PELVIS W CONTRAST with SHCT1   [facility] ([facility])    [address] [address]   [phone]           Please bring any scans or X-rays taken at other hospitals, if similar tests were done. Also bring a list of your medicines, including vitamins, minerals and over-the-counter drugs. It is safest to leave personal items at home.  Be sure to tell your doctor:   If you have any allergies.   If there s any chance you are pregnant.   If you are breastfeeding.  You may have contrast for this exam. To prepare:   Do not eat or drink for 2 hours before your exam. If you need to take medicine, you may take it with small sips of water. (We may ask you to take liquid medicine as well.)   The day before your exam, drink extra fluids at least six 8-ounce glasses (unless your doctor tells you to restrict your fluids).   You will be given instructions on how to drink the contrast.  Patients over 70 or patients with diabetes or kidney problems:   If you haven t had a blood test  (creatinine test) within the last 30 days, the Cardiologist/Radiologist may require you to get this test prior to your exam.  If you have diabetes:   Continue to take your metformin medication on the day of your exam  Please wear loose clothing, such as a sweat suit or jogging clothes. Avoid snaps, zippers and other metal. We may ask you to undress and put on a hospital gown.  If you have any questions, please call the Imaging Department where you will have your exam.            Mar 29, 2018  2:00 PM CDT   Return Visit with Nahum Pa MD   [facility] ([facility])    [facility]  [address]  [facility] [id number] [phone]            Apr 26, 2018  1:15 PM CDT   (Arrive by 1:00 PM)   New Visit with Jessica Kramer NP   [facility] ([facility])    [address]  [address]  [facility] [address] [phone]              Who to contact     If you have questions or need follow up information about today's clinic visit or your schedule please contact [facility] [place] directly at [phone].  Normal or non-critical lab and imaging results will be communicated to you by SkillBoosthart, letter or phone within 4 business days after the clinic has received the results. If you do not hear from us within 7 days, please contact the clinic through SkillBoosthart or phone. If you have a critical or abnormal lab result, we will notify you by phone as soon as possible.  Submit refill requests through LiquidPlanner or call your pharmacy and they will forward the refill request to us. Please allow 3 business days for your refill to be completed.          Additional Information About Your Visit        LiquidPlanner Information     LiquidPlanner gives you secure access to your electronic health record. If you see a primary care provider, you can also send messages to your care team and make appointments. If you have questions,  please call your primary care clinic.  If you do not have a primary care provider, please call [phone] and they will assist you.        Care EveryWhere ID     This is your Care EveryWhere ID. This could be used by other organizations to access your [place] medical records  [id number]         Blood Pressure from Last 3 Encounters:   02/12/18 116/76   02/05/18 132/78   09/28/17 136/82    Weight from Last 3 Encounters:   02/12/18 183 lb 6 oz (83.2 kg)   02/05/18 184 lb 8 oz (83.7 kg)   09/28/17 182 lb 9.6 oz (82.8 kg)              Today, you had the following     No orders found for display         Today's Medication Changes          These changes are accurate as of 2/20/18  2:11 PM.  If you have any questions, ask your nurse or doctor.               Start taking these medicines.        Dose/Directions    betamethasone (augmented) 0.05 % lotion   Commonly known as:  DIPROLENE   Used for:  Seborrheic dermatitis   Started by:  Mica Singer MD        Apply to AA on scalp bid for 7-10 days then when needed   Quantity:  60 mL   Refills:  1       triamcinolone 0.1 % cream   Commonly known as:  KENALOG   Used for:  Dermatitis   Started by:  Mica Singer MD        Apply to AA on the upper chest and upper back bid for 10-14 days   Quantity:  80 g   Refills:  0            Where to get your medicines      These medications were sent to [id number] - [place] [place] - [address] [place] [address]  [address] [address]     Phone:  [phone]     betamethasone (augmented) 0.05 % lotion    triamcinolone 0.1 % cream                Primary Care Provider Office Phone # Fax #    Nayeli Castorena PA-C [phone]       [address] [address]        Equal Access to Services     PATRICIA BUSH AH: Lianne Barrios, wajavedda jami, qaybta glynn huerta, sky vasquezaan ah.  So [facility] [phone].    ATENCIÓN: Si sol carreon, tiene a gaytan disposición servicios gratuitos de asistencia lingüística. Chelsi espinal [phone].    We comply with applicable federal civil rights laws and [place] laws. We do not discriminate on the basis of race, color, national origin, age, disability, sex, sexual orientation, or gender identity.            Thank you!     Thank you for choosing [facility] [place]  for your care. Our goal is always to provide you with excellent care. Hearing back from our patients is one way we can continue to improve our services. Please take a few minutes to complete the written survey that you may receive in the mail after your visit with us. Thank you!             Your Updated Medication List - Protect others around you: Learn how to safely use, store and throw away your medicines at www.disposemymeds.org.          This list is accurate as of 2/20/18  2:11 PM.  Always use your most recent med list.                   Brand Name Dispense Instructions for use Diagnosis    B-12 1000 MCG Tbcr     100 tablet    Take 1,000 mcg by mouth daily    Vitamin B12 deficiency       betamethasone (augmented) 0.05 % lotion    DIPROLENE    60 mL    Apply to AA on scalp bid for 7-10 days then when needed    Seborrheic dermatitis       CENTRUM SILVER per tablet      Take 1 tablet by mouth daily        folic acid 1 MG tablet    FOLVITE    30 tablet    Take 1 tablet (1 mg) by mouth daily    Alcohol abuse       furosemide 20 MG tablet    LASIX    90 tablet    Take 1 tablet (20 mg) by mouth as needed (taking ~ 1x/weekly as needed for swelling)    Benign hypertension       gabapentin 300 MG capsule    NEURONTIN    270 capsule    Take 1 capsule (300 mg) by mouth 3 times daily    Chronic pain syndrome       ketoconazole 2 % shampoo    NIZORAL    120 mL    Apply to the affected area and wash off after 5 minutes.    Tinea capitis       magnesium oxide 400 MG tablet    MAG-OX    7 tablet    Take 1  tablet (400 mg) by mouth daily    Low magnesium levels       melatonin 3 MG tablet      Take 2 tablets (6 mg) by mouth nightly as needed for sleep        permethrin 5 % cream    ELIMITE    60 g    Apply cream from head to toe (except the face); leave on for 8-14 hours then wash off with water; reapply in 1 week if live mites appear.    Rash       polyethylene glycol Packet    MIRALAX/GLYCOLAX    7 packet    Take 17 g by mouth daily as needed (constipation)    Constipation, unspecified constipation type       predniSONE 20 MG tablet    DELTASONE    20 tablet    Take 60 mg daily for 3 days, then 40 mg daily for 3 days, then 20 mg daily for 3 days, then 10 mg for 4 days    Rash       senna-docusate 8.6-50 MG per tablet    SENOKOT-S;PERICOLACE    100 tablet    Take 1-2 tablets by mouth 2 times daily as needed for constipation    Constipation, unspecified constipation type       spironolactone 100 MG tablet    ALDACTONE     100 mg daily        sucralfate 1 GM/10ML suspension    CARAFATE    1200 mL    Take 10 mLs (1 g) by mouth 4 times daily (before meals and nightly) Pt takes once daily    Esophagitis       temazepam 7.5 MG capsule   Start taking on:  2/27/2018    RESTORIL    30 capsule    Take 1 capsule (7.5 mg) by mouth nightly as needed for sleep (RX must last 30 days)    Thrombocytopenia (H), Liver disease, chronic, due to alcohol (H)       thiamine 100 MG tablet      Take 1 tablet (100 mg) by mouth daily    Alcohol abuse       traZODone 100 MG tablet    DESYREL    90 tablet    Take 1 tablet (100 mg) by mouth nightly as needed for sleep    Insomnia, unspecified type       triamcinolone 0.1 % cream    KENALOG    80 g    Apply to AA on the upper chest and upper back bid for 10-14 days    Dermatitis       valACYclovir 1000 mg tablet    VALTREX    4 tablet    TAKE 2 TABS EVERY 12 HRS FOR 1 DAY ONLY FOR OUTBREAKS OF COLD SORES as needed    Herpes simplex virus infection       vortioxetine 10 MG tablet    BRINTELLIX    90  tablet    Take 1 tablet (10 mg) by mouth daily    Depression, unspecified depression type

## 2018-02-20 NOTE — PATIENT INSTRUCTIONS
"FUTURE APPOINTMENTS  Follow up in 2 week(s)    TOPICAL STEROID INSTRUCTIONS - for use on scalp  Betamethasone dipropionate 0.05% lotion.    Apply a few drops and rub into the affected areas on the scalp, two times per day for 7-10 days.    Not to be used on face or groin.    After initial treatment, topical steroid may be used as needed for flare-ups.    Topical steroid use is for short-term treatment only. If after initial treatment, you are using this for prolonged periods of time, return to clinic for re-evaluation of treatment.    SHAMPOO INSTRUCTIONS  Ketoconazole 2% shampoo. Wash hair twice a week with this shampoo, applying it to damp skin. Leave on for 5 minutes before rinsing.    Use one of the following shampoos (active ingredients underlined):  Coal Tar shampoos : Neutrogena T/Gel, Denorex, DHS Tar, Ionil-T, Tegrin, X-Seb T, Zetar  Zinc Pyrithione shampoos : Head & Shoulders, Denorex Advance Formula, DHS Zinc, Zincon  Salicylic Acid shampoos : Neutrogena T/Sal, DHS Sal, Ionil, P&S, Sebulex, X-Seb  Selenium Sulfide shampoos : Selsun Blue shampoo  Sulfur shampoos : Sebulex  Ketoconazole : Nizoral 1%  Consider alternating use of shampoos with different active ingredients every 4 week(s).    TOPICAL STEROID INSTRUCTIONS  Triamcinolone 0.1% cream.    Apply an amount equal to half of a fingertip (0.25 g) to the affected area(s) on the chest and upper back, two times per day for 10-14 days.    \"Fingertip Amount\"      Not to be used on face or groin.    After initial treatment, topical steroid may be used as needed for flare-ups.    Topical steroid use is for short-term treatment only. If after initial treatment, you are using this for prolonged periods of time, return to clinic for re-evaluation of treatment.    Keep in mind to also regularly use moisturizer, as this preventative measure can help maintain your skin's natural moisture barrier.  "

## 2018-02-20 NOTE — LETTER
2/20/2018         RE: Kavitha Headley  [address] [facility] [id number]        Dear Colleague,    Thank you for referring your patient, Kavitha Headley, to the [facility] [place]. Please see a copy of my visit note below.    [facility] - PRIMARY CARE SKIN    CC : Rash   SUBJECTIVE:                                                    Kavitha Headley is a 72 year old female who presents to clinic today because of a(n) itchy rash beginning 3 weeks ago on the scalp and spreading onto the back. She is concerned about possible insect bites or ringworm. She reports that her fiancee has had similar symptoms (which preceded her onset of symptoms) after exposure to pet cat.    Therapies tried :   Permethrin begun 2/12/18  Prednisone 60 mg, 13 day taper begun 2/12/18 - no improvement of itchiness on chest  Ketoconazole 2% shampoo  Also cephalexin, clotrimazole, OTC antifungal previously  No improvement of itchiness with any therapies.    Pruritic : YES - she may be scratching unconsciously in the night.  Symptoms appear to be : not improving.  Aggravating factors : none identified.  Relieving factors : none identified.    Previous similar history : NO.  Recent exposure history : ?pet cat exposure ?insect bites, no travels preceding onset of rash  Any other family members with similar symptoms : YES - melina had similar symptoms diagnosed as ringworm on scalp and hands (confirmed with scraping test) now resolved.  Products used : ?Curel lotion. Some perfumes used. Suave mint shampoo.    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    Patient Active Problem List   Diagnosis     Hyperlipidemia LDL goal <130     Spinal stenosis     Chronic insomnia     Alcohol abuse      CKD (chronic kidney disease) stage 3, GFR 30-59 ml/min     Hip joint replacement status     Knee joint replacement status     Chronic pain syndrome     Bariatric surgery status     Personal history of urinary calculi     Macrocytic anemia     Anxiety     Aortic stenosis     Left-sided low back pain without sciatica     ACP (advance care planning)     PAC (premature atrial contraction)     Gastroesophageal reflux disease, esophagitis presence not specified     Controlled substance agreement signed     Seasonal affective disorder (H)     HTN, goal below 140/90     Bilateral lower leg cellulitis     Hypokalemia     Hyponatremia     Cellulitis     Depression, unspecified depression type     Vitamin B12 deficiency     Severe alcohol dependence (H)       Past Medical History:   Diagnosis Date     Alcohol abuse     long term alcohol abuse     Anxiety disorder      Bariatric surgery status 1996?    gastric bypass, [facility] and     Benign hypertension      Chronic insomnia      Chronic pain syndrome     Chronic back and neck pain, chronic pain due to osteoarthritis multiple joints     Coronary artery disease 9/2015    mild distal branch disease on cath     Hip joint replacement status 4/2004    right     Knee joint replacement status 12/2005    left     Macrocytic anemia     Mild macrocytic anemia, 2012 to present, likely based on alcohol abuse.     Major depressive disorder, single episode, severe, without mention of psychotic behavior      Mixed hyperlipidemia      Moderate aortic stenosis 5/2014    mild/mod AS with peak gradient 33/mean gradient 20 mmHg, AV area 1.2     Pelvic relaxation disorder     Surgical intervention for cystocele/rectocele 3,11/2012     Personal history of urinary calculi 6/2006    left ureteral stone,lithotripsy     PVC (premature ventricular contraction)      Stage III chronic kidney disease 2005     SVT (supraventricular tachycardia) (H)     likely atrial tachycardia    Past Surgical History:    Procedure Laterality Date     APPENDECTOMY  3/2004    incidental     C GASTRIC BYPASS,OBESE<100CM ARIANNA-EN-Y  1996     C MEDIASTINOSCOPY W OR WO BIOPSY  2/2008    Videomediastinoscopy and, for mediastinal adenopathy -reactive lymphoid hyperplasia     C REPAIR OF RECTOCELE  3/2012     C TOTAL KNEE ARTHROPLASTY  12/2005    left      CARPAL TUNNEL RELEASE RT/LT  10/2010    Carpometacarpal excisional arthroplasty with a fascial autograft and APL suspension sling (50554). 2. Left thumb metacarpophalangeal joint fusion with autologous bone graft (62422). 3. Left endoscopic carpal tunnel release      CHOLECYSTECTOMY, LAPOROSCOPIC  11/2010    Cholecystectomy, Laparoscopic     COLONOSCOPY N/A 9/8/2016    Procedure: COMBINED COLONOSCOPY, SINGLE OR MULTIPLE BIOPSY/POLYPECTOMY BY BIOPSY;  Surgeon: Moe Barlow MD;  Location:  GI     CYSTOCELE REPAIR  11/2012    davinc laparoscopic sacrocolpopexy, enterocele repair, lysis of adhesions, placement of retropubic mid urethral sling, cystoscopy     CYSTOSCOPY, LITHOTRIPSY, COMBINED  6/2006    Left extracorporeal shock wave lithotripsy, cystoscopy, left ureteral stent placement.     CYSTOSCOPY, REMOVE STENT(S), COMBINED  7/2006    Cystoscopy, removal of left ureteral stent, retrograde pyelography, flexible and rigid ureteroscopy and holmium laser lithotripsy, basket removal of stone fragments, ureteral stent placement.      ENDOSCOPIC ULTRASOUND UPPER GASTROINTESTINAL TRACT (GI) N/A 6/12/2017    Procedure: ENDOSCOPIC ULTRASOUND, ESOPHAGOSCOPY / UPPER GASTROINTESTINAL TRACT (GI);  ENDOSCOPIC ULTRASOUND, ESOPHAGOSCOPY / UPPER GASTROINTESTINAL TRACT (GI);  Surgeon: Parth Graham MD;  Location:  GI     HERNIA REPAIR  4/2012    bilateral augmentation mastopexy, ventral hernia repair, and medial thigh liposuction on 04/06/2012.      HYSTERECTOMY VAGINAL, BILATERAL SALPINGO-OOPHERECTOMY, COMBINED  1998    due to myoma and bleeding     JOINT REPLACEMENT, HIP RT/LT  4/2004     right total hip arthroplasty     LAPAROTOMY, LYSIS ADHESIONS, COMBINED  3/2004    lysis adhesions, ventral hernia repair, appendectomy incidentally     LYMPH NODE BIOPSY  4/2008    right axillary, reactive follicular and paracortical hyperplasia.     MAMMOPLASTY AUGMENTATION BILATERAL  4/2012     REVISE RECONSTRUCTED BREAST  6/7/2012    Left breast capsulotomy.       Social History   Substance Use Topics     Smoking status: Never Smoker     Smokeless tobacco: Never Used     Alcohol use No      Comment: none    Family History     Problem (# of Occurrences) Relation (Name,Age of Onset)    CANCER (1) Father: throat and lung mets    Substance Abuse (1) Father       Negative family history of: DIABETES, Coronary Artery Disease, CEREBROVASCULAR DISEASE           Prescription Medications as of 2/20/2018             betamethasone, augmented, (DIPROLENE) 0.05 % lotion Apply to AA on scalp bid for 7-10 days then when needed    triamcinolone (KENALOG) 0.1 % cream Apply to AA on the upper chest and upper back bid for 10-14 days    permethrin (ELIMITE) 5 % cream Apply cream from head to toe (except the face); leave on for 8-14 hours then wash off with water; reapply in 1 week if live mites appear.    predniSONE (DELTASONE) 20 MG tablet Take 60 mg daily for 3 days, then 40 mg daily for 3 days, then 20 mg daily for 3 days, then 10 mg for 4 days    ketoconazole (NIZORAL) 2 % shampoo Apply to the affected area and wash off after 5 minutes.    temazepam (RESTORIL) 7.5 MG capsule Starting on 2/27/2018. Take 1 capsule (7.5 mg) by mouth nightly as needed for sleep (RX must last 30 days)    Cyanocobalamin (B-12) 1000 MCG TBCR Take 1,000 mcg by mouth daily    traZODone (DESYREL) 100 MG tablet Take 1 tablet (100 mg) by mouth nightly as needed for sleep    vortioxetine (BRINTELLIX) 10 MG tablet Take 1 tablet (10 mg) by mouth daily    gabapentin (NEURONTIN) 300 MG capsule Take 1 capsule (300 mg) by mouth 3 times daily    sucralfate  (CARAFATE) 1 GM/10ML suspension Take 10 mLs (1 g) by mouth 4 times daily (before meals and nightly) Pt takes once daily    spironolactone (ALDACTONE) 100 MG tablet 100 mg daily    melatonin 3 MG tablet Take 2 tablets (6 mg) by mouth nightly as needed for sleep    folic acid (FOLVITE) 1 MG tablet Take 1 tablet (1 mg) by mouth daily    thiamine 100 MG tablet Take 1 tablet (100 mg) by mouth daily    magnesium oxide (MAG-OX) 400 MG tablet Take 1 tablet (400 mg) by mouth daily    polyethylene glycol (MIRALAX/GLYCOLAX) Packet Take 17 g by mouth daily as needed (constipation)    senna-docusate (SENOKOT-S;PERICOLACE) 8.6-50 MG per tablet Take 1-2 tablets by mouth 2 times daily as needed for constipation    Multiple Vitamins-Minerals (CENTRUM SILVER) per tablet Take 1 tablet by mouth daily    furosemide (LASIX) 20 MG tablet Take 1 tablet (20 mg) by mouth as needed (taking ~ 1x/weekly as needed for swelling)    valACYclovir (VALTREX) 1000 mg tablet TAKE 2 TABS EVERY 12 HRS FOR 1 DAY ONLY FOR OUTBREAKS OF COLD SORES as needed            Allergies   Allergen Reactions     Bactrim [Sulfamethoxazole W/Trimethoprim] Hives     Codeine Itching     NAUSEA     Morphine Itching     NAUSEA     Quetiapine         INTEGUMENTARY/SKIN: POSITIVE for pruritis    ROS : 14 point review of systems was negative except the symptoms listed above in the HPI.    This document serves as a record of the services and decisions personally performed and made by Alethea Singer MD. It was created on her behalf by Braxton Mi, a trained medical scribe.  The creation of this document is based on the scribe's personal observations and the provider's statements to the medical scribe.  Braxton Mi, February 20, 2018 1:49 PM      OBJECTIVE:                                                    GENERAL: healthy, alert and in mild distress  SKIN: Fox Skin Type - II.  Scalp, Face, Neck, Trunk, Arms and Hands were examined. The dermatoscope was used to help  evaluate pigmented lesions.  Skin Pertinent Findings:  Back : Multiple excoriated papules confined to upper back and extending onto upper chest.    Posterior occipital scalp : Scaly erythematous plaque, most prominent on occipital scalp.    Right frontal scalp : 3 mm in size stuck-on appearing papules, raised, brown, coarse-textured, round lesion(s) most consistent with seborrheic keratoses.    Wrists, hands, elbows : Clear    Diagnostic Test Results:  none     MDM : I would still keep scalp psoriasis in the differential .      ASSESSMENT:                                                      Encounter Diagnoses   Name Primary?     Seborrheic keratosis      Seborrheic dermatitis Yes     Dermatitis          PLAN:                                                    Patient Instructions   FUTURE APPOINTMENTS  Follow up in 2 week(s)    TOPICAL STEROID INSTRUCTIONS - for use on scalp  Betamethasone dipropionate 0.05% lotion.    Apply a few drops and rub into the affected areas on the scalp, two times per day for 7-10 days.    Not to be used on face or groin.    After initial treatment, topical steroid may be used as needed for flare-ups.    Topical steroid use is for short-term treatment only. If after initial treatment, you are using this for prolonged periods of time, return to clinic for re-evaluation of treatment.    SHAMPOO INSTRUCTIONS  Ketoconazole 2% shampoo. Wash hair twice a week with this shampoo, applying it to damp skin. Leave on for 5 minutes before rinsing.    Use one of the following shampoos (active ingredients underlined):  Coal Tar shampoos : Neutrogena T/Gel, Denorex, DHS Tar, Ionil-T, Tegrin, X-Seb T, Zetar  Zinc Pyrithione shampoos : Head & Shoulders, Denorex Advance Formula, DHS Zinc, Zincon  Salicylic Acid shampoos : Neutrogena T/Sal, DHS Sal, Ionil, P&S, Sebulex, X-Seb  Selenium Sulfide shampoos : Selsun Blue shampoo  Sulfur shampoos : Sebulex  Ketoconazole : Nizoral 1%  Consider alternating use of  "shampoos with different active ingredients every 4 week(s).    TOPICAL STEROID INSTRUCTIONS  Triamcinolone 0.1% cream.    Apply an amount equal to half of a fingertip (0.25 g) to the affected area(s) on the chest and upper back, two times per day for 10-14 days.    \"Fingertip Amount\"      Not to be used on face or groin.    After initial treatment, topical steroid may be used as needed for flare-ups.    Topical steroid use is for short-term treatment only. If after initial treatment, you are using this for prolonged periods of time, return to clinic for re-evaluation of treatment.    Keep in mind to also regularly use moisturizer, as this preventative measure can help maintain your skin's natural moisture barrier.      The patient was counseled to use products free of fragrance, dyes, and plants. The importance of using bland cleansers and the regular use of heavy bland emollient creams was impressed upon the patient.      PROCEDURES:                                                    None.    TT : 20 minutes.  CT : 15 minutes.      The information in this document, created by the medical scribe for me, accurately reflects the services I personally performed and the decisions made by me. I have reviewed and approved this document for accuracy prior to leaving the patient care area.  Alethea Singer MD February 20, 2018 1:49 PM  [facility] [place]        Again, thank you for allowing me to participate in the care of your patient.        Sincerely,        Mica Singer MD    "

## 2018-02-22 ENCOUNTER — OFFICE VISIT (OUTPATIENT)
Dept: PHARMACY | Facility: CLINIC | Age: 73
End: 2018-02-22
Payer: COMMERCIAL

## 2018-02-22 VITALS
DIASTOLIC BLOOD PRESSURE: 82 MMHG | WEIGHT: 186 LBS | HEART RATE: 96 BPM | BODY MASS INDEX: 31.93 KG/M2 | SYSTOLIC BLOOD PRESSURE: 123 MMHG

## 2018-02-22 DIAGNOSIS — F32.A DEPRESSION, UNSPECIFIED DEPRESSION TYPE: Primary | ICD-10-CM

## 2018-02-22 DIAGNOSIS — L30.9 DERMATITIS: ICD-10-CM

## 2018-02-22 DIAGNOSIS — I10 HTN, GOAL BELOW 140/90: ICD-10-CM

## 2018-02-22 DIAGNOSIS — K21.9 GASTROESOPHAGEAL REFLUX DISEASE WITHOUT ESOPHAGITIS: ICD-10-CM

## 2018-02-22 DIAGNOSIS — M19.90 ARTHRITIS: ICD-10-CM

## 2018-02-22 DIAGNOSIS — F10.10 ALCOHOL ABUSE: ICD-10-CM

## 2018-02-22 DIAGNOSIS — E63.9 NUTRITIONAL DEFICIENCY: ICD-10-CM

## 2018-02-22 DIAGNOSIS — F10.982 ALCOHOL-INDUCED INSOMNIA (H): ICD-10-CM

## 2018-02-22 PROCEDURE — 99207 ZZC NO CHARGE LOS: CPT | Performed by: PHARMACIST

## 2018-02-22 RX ORDER — DIPHENHYDRAMINE HCL 25 MG
25 CAPSULE ORAL
COMMUNITY
End: 2019-12-04

## 2018-02-22 NOTE — MR AVS SNAPSHOT
After Visit Summary   2/22/2018    Kavitha Headley    MRN: [id number]           Patient Information     Date Of Birth          1945        Visit Information        Provider Department      2/22/2018 1:30 PM Sheri Elizondo, [facility] MTM        Care Instructions    Recommendations from today's MTM visit:                                                      1. I will look into reasonable options for treating depression that do not impair liver or do not get cleared by the liver. I will then speak with you and Nayeli Castorena about options, potentially including going back on mirtazapine.     2. I will speak with Nayeli about coming down on gabapentin as well.    3. I will double check your other medications too to make sure they are all still appropriate with your current level of liver function.    4. Make sure they let you know at your next GI appointment whether or not you can stop sucralfate.    5. I will speak with Nayeli to see if she would be open to a slightly slower taper on your temazepam. Please respect her order until you hear from her or myself.    6. Do what you can to use good sleep hygiene to improve your sleep quality. This is first line therapy for insomnia.    7. Follow up with Nayeli about your drinking.    Next MTM visit: I will call you Tuesday at 1:30pm    To schedule another MTM appointment, please call the clinic directly or you may call the MTM scheduling line at [phone] or toll-free at [phone].     My Clinical Pharmacist's contact information:                                                      It was a pleasure seeing you today!  Please feel free to contact me with any questions or concerns you have.      Sheri Elizondo, Oscar  Medication Therapy Management Provider  Phone: [phone]  [email or web address]    You may receive a survey about the MTM services you received.  I would appreciate your feedback to help me serve you better in  the future. Please fill it out and return it when you can. Your comments will be anonymous.                  Follow-ups after your visit        Your next 10 appointments already scheduled     Feb 27, 2018  1:30 PM CST   TELEMEDICINE with Sheri Elizondo AN   [facility] [place] ([facility]    [address] [place] [id number] [phone]           Note: this is not an onsite visit; there is no need to come to the facility.            Mar 06, 2018  2:00 PM CST   Office Visit with Mica Singer MD   [facility] [place]    [address] [place] [id number] [phone]           Bring a current list of meds and any records pertaining to this visit. For Physicals, please bring immunization records and any forms needing to be filled out. Please arrive 10 minutes early to complete paperwork.            Mar 26, 2018  1:30 PM CDT   Return Visit with  Oncology Nurse   [facility] ([facility])    [facility]  [address]  [facility] [address] [phone]            Mar 26, 2018  2:00 PM CDT   CT PELVIS W CONTRAST with SHCT1   [facility] ([facility])    [address] [facility] [address] [phone]           Please bring any scans or X-rays taken at other hospitals, if similar tests were done. Also bring a list of your medicines, including vitamins, minerals and over-the-counter drugs. It is safest to leave personal items at home.  Be sure to tell your doctor:   If you have any allergies.   If there s any chance you are pregnant.   If you are breastfeeding.  You may have contrast for this exam. To prepare:   Do not eat or drink for 2 hours before your exam. If you need to take medicine, you may take it with small sips of water. (We may ask you to take liquid medicine as well.)   The  day before your exam, drink extra fluids at least six 8-ounce glasses (unless your doctor tells you to restrict your fluids).   You will be given instructions on how to drink the contrast.  Patients over 70 or patients with diabetes or kidney problems:   If you haven t had a blood test (creatinine test) within the last 30 days, the Cardiologist/Radiologist may require you to get this test prior to your exam.  If you have diabetes:   Continue to take your metformin medication on the day of your exam  Please wear loose clothing, such as a sweat suit or jogging clothes. Avoid snaps, zippers and other metal. We may ask you to undress and put on a hospital gown.  If you have any questions, please call the Imaging Department where you will have your exam.            Mar 29, 2018  2:00 PM CDT   Return Visit with Nahum Pa MD   [facility] ([facility])    [facility]  [address] [facility] [address]  [facility] [id number] [phone]            Apr 26, 2018  1:15 PM CDT   (Arrive by 1:00 PM)   New Visit with Jessica Kramer NP   [facility] ([facility])    [address]  [address]  [facility] [address] [phone]              Who to contact     If you have questions or need follow up information about today's clinic visit or your schedule please contact [facility] directly at [phone].  Normal or non-critical lab and imaging results will be communicated to you by MyChart, letter or phone within 4 business days after the clinic has received the results. If you do not hear from us within 7 days, please contact the clinic through [facility] or phone. If you have a critical or abnormal lab result, we will notify you by phone as soon as possible.  Submit refill requests through [email or web address] or call your pharmacy and they will forward the refill request to us. Please allow 3 business days for your refill to be  completed.          Additional Information About Your Visit        MyChart Information     DriftToIt gives you secure access to your electronic health record. If you see a primary care provider, you can also send messages to your care team and make appointments. If you have questions, please call your primary care clinic.  If you do not have a primary care provider, please call [phone] and they will assist you.        Care EveryWhere ID     This is your Care EveryWhere ID. This could be used by other organizations to access your [place] medical records  [id number]        Your Vitals Were     BMI (Body Mass Index)                   31.93 kg/m2            Blood Pressure from Last 3 Encounters:   02/12/18 116/76   02/05/18 132/78   09/28/17 136/82    Weight from Last 3 Encounters:   02/22/18 186 lb (84.4 kg)   02/12/18 183 lb 6 oz (83.2 kg)   02/05/18 184 lb 8 oz (83.7 kg)              Today, you had the following     No orders found for display       Primary Care Provider Office Phone # Fax #    Nayeli Castorena PA-C [phone]       [address] [address]        Equal Access to Services     PATRICIA BUSH : Hadii aad ku hadasho Soomaali, waaxda luqadaha, qaybta kaalmada adeegyada, waxay sanjuanita queenn alan king. So [facility] [phone].    ATENCIÓN: Si habla español, tiene a gaytan disposición servicios gratuitos de asistencia lingüística. [facility] [phone].    We comply with applicable federal civil rights laws and [place] laws. We do not discriminate on the basis of race, color, national origin, age, disability, sex, sexual orientation, or gender identity.            Thank you!     Thank you for choosing [facility]  for your care. Our goal is always to provide you with excellent care. Hearing back from our patients is one way we can continue to improve our services. Please take a few minutes to complete the written survey  that you may receive in the mail after your visit with us. Thank you!             Your Updated Medication List - Protect others around you: Learn how to safely use, store and throw away your medicines at www.disposemymeds.org.          This list is accurate as of 2/22/18  2:31 PM.  Always use your most recent med list.                   Brand Name Dispense Instructions for use Diagnosis    B-12 1000 MCG Tbcr     100 tablet    Take 1,000 mcg by mouth daily    Vitamin B12 deficiency       betamethasone (augmented) 0.05 % lotion    DIPROLENE    60 mL    Apply to AA on scalp bid for 7-10 days then when needed    Seborrheic dermatitis       CENTRUM SILVER per tablet      Take 1 tablet by mouth daily        diphenhydrAMINE 25 MG capsule    BENADRYL     Take 25 mg by mouth nightly as needed        folic acid 1 MG tablet    FOLVITE    30 tablet    Take 1 tablet (1 mg) by mouth daily    Alcohol abuse       furosemide 20 MG tablet    LASIX    90 tablet    Take 1 tablet (20 mg) by mouth as needed (taking ~ 1x/weekly as needed for swelling)    Benign hypertension       gabapentin 300 MG capsule    NEURONTIN    270 capsule    Take 1 capsule (300 mg) by mouth 3 times daily    Chronic pain syndrome       ketoconazole 2 % shampoo    NIZORAL    120 mL    Apply to the affected area and wash off after 5 minutes.    Tinea capitis       magnesium oxide 400 MG tablet    MAG-OX    7 tablet    Take 1 tablet (400 mg) by mouth daily    Low magnesium levels       melatonin 3 MG tablet      Take 2 tablets (6 mg) by mouth nightly as needed for sleep        polyethylene glycol Packet    MIRALAX/GLYCOLAX    7 packet    Take 17 g by mouth daily as needed (constipation)    Constipation, unspecified constipation type       predniSONE 20 MG tablet    DELTASONE    20 tablet    Take 60 mg daily for 3 days, then 40 mg daily for 3 days, then 20 mg daily for 3 days, then 10 mg for 4 days    Rash       senna-docusate 8.6-50 MG per tablet     SENOKOT-S;PERICOLACE    100 tablet    Take 1-2 tablets by mouth 2 times daily as needed for constipation    Constipation, unspecified constipation type       spironolactone 100 MG tablet    ALDACTONE     100 mg daily        sucralfate 1 GM/10ML suspension    CARAFATE    1200 mL    Take 10 mLs (1 g) by mouth 4 times daily (before meals and nightly) Pt takes once daily    Esophagitis       temazepam 7.5 MG capsule   Start taking on:  2/27/2018    RESTORIL    30 capsule    Take 1 capsule (7.5 mg) by mouth nightly as needed for sleep (RX must last 30 days)    Thrombocytopenia (H), Liver disease, chronic, due to alcohol (H)       thiamine 100 MG tablet      Take 1 tablet (100 mg) by mouth daily    Alcohol abuse       traZODone 100 MG tablet    DESYREL    90 tablet    Take 1 tablet (100 mg) by mouth nightly as needed for sleep    Insomnia, unspecified type       triamcinolone 0.1 % cream    KENALOG    80 g    Apply to AA on the upper chest and upper back bid for 10-14 days    Dermatitis       valACYclovir 1000 mg tablet    VALTREX    4 tablet    TAKE 2 TABS EVERY 12 HRS FOR 1 DAY ONLY FOR OUTBREAKS OF COLD SORES as needed    Herpes simplex virus infection       vortioxetine 10 MG tablet    BRINTELLIX    90 tablet    Take 1 tablet (10 mg) by mouth daily    Depression, unspecified depression type

## 2018-02-22 NOTE — PATIENT INSTRUCTIONS
Recommendations from today's MTM visit:                                                      1. I will look into reasonable options for treating depression that do not impair liver or do not get cleared by the liver. I will then speak with you and Nayeli Castorena about options, potentially including going back on mirtazapine.     2. I will speak with Nayeli about coming down on gabapentin as well.    3. I will double check your other medications too to make sure they are all still appropriate with your current level of liver function.    4. Make sure they let you know at your next GI appointment whether or not you can stop sucralfate.    5. I will speak with Nayeli to see if she would be open to a slightly slower taper on your temazepam. Please respect her order until you hear from her or myself.    6. Do what you can to use good sleep hygiene to improve your sleep quality. This is first line therapy for insomnia.    7. Follow up with Nayeli about your drinking.    Next MTM visit: I will call you Tuesday at 1:30pm    To schedule another MTM appointment, please call the clinic directly or you may call the MTM scheduling line at [phone] or toll-free at [phone].     My Clinical Pharmacist's contact information:                                                      It was a pleasure seeing you today!  Please feel free to contact me with any questions or concerns you have.      Sheri Elizondo, Oscar  Medication Therapy Management Provider  Phone: [phone]  [email or web address]    You may receive a survey about the MTM services you received.  I would appreciate your feedback to help me serve you better in the future. Please fill it out and return it when you can. Your comments will be anonymous.

## 2018-02-22 NOTE — PROGRESS NOTES
"SUBJECTIVE/OBJECTIVE:                           Kavitha Headley is a 72 year old female coming in for an initial visit for Medication Therapy Management.  She was referred to me from Nayeli Castorena.     Chief Complaint: Coverage/efficacy ssues with depression medications.    Allergies/ADRs: Reviewed in Epic  Tobacco: No tobacco use  Alcohol: Current struggle with alcohol dependence. Last beverage was last night.    PMH: Reviewed in Epic    Medication Adherence/Access  No major concerns found today.    Depression: Pt started Brintellix and it was $600 for 3 months. Started 2 weeks ago while in the hospital 2/2 ongoing alcoholism. She feels this so far is going well aside from the cost and so is wondering if there is a cheaper, similar option. Historically she was on mirtazapine and felt this was working well for her but was tapered down from 45mg to 15mg during the hospital stay due to elevated LFTs (which she admits were also likely caused by ETOH abuse) and then was tapered to 7.5mg and finally off the medication outpatient. She was also on Effexor in the past as well as \"most SSRIs\".    Pt is here because she would like to either restart mirtazapine or see if there is a cheaper similar option to Brintellix that would be safe with her reduced hepatic function.    Arthritis: Pt is wondering if she can come down on her dose of gabapentin. She is currently taking 300mg TID and is not sure that she still needs this much medication. She feels her arthritis is relatively well-controlled at this time.    Insomnia: Pt is currently taking temazepam 15mg, trazodone 100mg, melatonin 6mg, and magnesium oxide 400mg nightly for sleep, which she feels is mostly effective. She is nervous because she is currently working with Nayeli to taper off temazepam and needs to decrease the dose to 7.5mg nightly per most recent order this week. She reports she probably used Ambien a long time ago but is not sure of what other meds she's tried. " She is pretty sure she has not been on doxepin. She will occasionally use Benadryl as well. She does not currently have issues with daytime drowsiness or word finding difficulty, etc.    Peptic Ulcer: Pt is currently taking sucralfate QID for a peptic ulcer. She is trying to space doses away from her other med doses. She reports she will be seeing GI soon for a follow up to evaluate improvement.    Dermatitis: Pt is currently treating a rash with topical betamethasone, triamcinolone and ketoconazole shampoo under the care of skin clinic. She reports completing a round of permethrin as prescribed last week to rule out scabies as a potential cause without improvement. She is also finishing a prednisone burst prescribed for this.    Hypertension: Current medications include spironolactone 100mg daily. She has furosemide 20mg tablets that she takes infrequently for additional swelling. Patient does not self-monitor BP.  Patient reports no current medication side effects.    Other Supplements: Pt is taking a multivitamin and B12 for general health, folate and thiamine for alcoholism without complaint of issues.    Current labs include:  Today's Vitals: /82  Pulse 96  Wt 186 lb (84.4 kg)  BMI 31.93 kg/m2  BP Readings from Last 3 Encounters:   02/12/18 116/76   02/05/18 132/78   09/28/17 136/82     Lab Results   Component Value Date    A1C 5.7 09/29/2010   .  Lab Results   Component Value Date    CHOL 232 05/11/2017     Lab Results   Component Value Date    TRIG 98 05/11/2017     Lab Results   Component Value Date     05/11/2017     Lab Results   Component Value Date    LDL 98 05/11/2017       Liver Function Studies -   Recent Labs   Lab Test  02/05/18   1252   PROTTOTAL  7.5   ALBUMIN  4.0   BILITOTAL  0.5   ALKPHOS  102   AST  73*   ALT  62*       Lab Results   Component Value Date    UCRR 177 06/13/2017    MICROL 18 07/28/2015    UMALCR 16.40 07/28/2015       Last Basic Metabolic Panel:  Lab Results    Component Value Date     02/05/2018      Lab Results   Component Value Date    POTASSIUM 3.9 02/05/2018     Lab Results   Component Value Date    CHLORIDE 95 02/05/2018     Lab Results   Component Value Date    BUN 12 02/05/2018     Lab Results   Component Value Date    CR 1.00 02/05/2018     GFR Estimate   Date Value Ref Range Status   02/05/2018 54 (L) >60 mL/min/1.7m2 Final     Comment:     Non  GFR Calc   09/25/2017 61 >60 mL/min/1.7m2 Final   08/03/2017 54 (L) >60 mL/min/1.7m2 Final     Comment:     Non  GFR Calc     GFR Estimate If Black   Date Value Ref Range Status   02/05/2018 66 >60 mL/min/1.7m2 Final     Comment:      GFR Calc   09/25/2017 74 >60 mL/min/1.7m2 Final   08/03/2017 65 >60 mL/min/1.7m2 Final     Comment:      GFR Calc     TSH   Date Value Ref Range Status   02/05/2018 1.40 0.40 - 4.00 mU/L Final   ]    Most Recent Immunizations   Administered Date(s) Administered     Influenza (High Dose) 3 valent vaccine 10/06/2016     Influenza (IIV3) PF 10/11/2012     Influenza Vaccine IM 3yrs+ 4 Valent IIV4 10/08/2015     Mantoux Tuberculin Skin Test 01/29/2016     Pneumo Conj 13-V (2010&after) 07/02/2015     Pneumococcal 23 valent 12/29/2008     TD (ADULT, 7+) 04/28/2004     TDAP Vaccine (Adacel) 07/10/2014       ASSESSMENT:                             Current medications were reviewed today. Medicare Part D topics discussed:Recommendations to doctor    Medication Adherence: no issues identified    Depression: Needs improvement. Discussed that mild hepatic cirrhosis does not appear to preclude use of mirtazapine per  labeling (same with Brintellix) but that I would like to do some further research prior to making a recommendation. We discussed that the research does not show that similarity of structures of various SSRIs correlates to similar efficacy so generally the recommendation is simply to keep trying different  options until an effective (safe) therapy is found.    Arthritis: May need improvement. Pt may benefit from trial of lower dose of gabapentin, especially as patients can become more sensitive to gabapentin effects (and side effects) as they age.    Insomnia: Needs improvement. Current use of benzodiazepines is not ideal with concurrent alcohol abuse as CD patients ideally should not use other addictive substances (reduced chance of sobriety, increased chance for relapse) so the plan to come off temazepam is absolutely appropriate. However, it is generally advisable to taper down at a slower pace, shooting for a 20% dose reduction every 1-2 weeks and taking extra care toward the end of the taper as withdrawal side effects can pose a safety risk. Of note, a mirtazapine restart as discussed above may help with pt's insomnia as long as the dose is restricted to 30mg or less (higher doses are stimulating), 7.5mg being the most sedating. Also of note, we discussed that any changes in sleep meds may take up to 2 weeks for pt's sleep cycle to adjust. We also discussed appropriate sleep hygiene (pt is familiar already). Pt's sobriety would also likely help with this issue as alcohol is notorious for disturbing normal sleep cycle.    Peptic Ulcer: Stable. We did discuss that this is not appropriate long-term therapy for PUD so when she meets with GI she should ask about coming off sucralfate, as it is likely affecting the absorption of some/most of her other medications.    Dermatitis: Unimproved. Pt encouraged to follow up with derm as planned and avoid long-term use of steroid creams (avoid using longer than advised by derm).    Hypertension: Unimproved. BP slightly higher than goal <130/80 in clinic today. If this continues to be an issue (in part if patient continues to drink, which is a factor here) we will address this at current visits.    Other Supplements: Stable. Current therapies likely safe and possibly effective  (thiamine likely effective).    PLAN:                            1. I will research options for depression therapy with patient's suspected mild cirrhosis.    2. I will discuss slower temazepam taper with PATRICIA Jalloh, as well as a plan for gabapentin taper longer-term.    3. Pt to follow up with GI and ask about length of sucralfate therapy.    I spent 60 minutes with this patient today (an extra 15 minutes was spent creating the Medication Action Plan). All changes were made via collaborative practice agreement with Nayeli Castorena. A copy of the visit note was provided to the patient's primary care provider.    Will follow up in 1 week.    The patient was given a summary of these recommendations as an after visit summary.     Sheri Elizondo PharmD  Medication Therapy Management Provider  Phone: [phone]  [email or web address]

## 2018-02-22 NOTE — LETTER
"     [facility]     Date: 3/1/2018    Kavitha Headley  [address] [facility] [address]    Dear Ms. Headley,    Thank you for talking with me on 18 about your health and medications. Medicare s MTM (Medication Therapy Management) program helps you understand your medications and use them safely.      This letter includes an action plan (Medication Action Plan) and medication list (Personal Medication List). The action plan has steps you should take to help you get the best results from your medications. The medication list will help you keep track of your medications and how to use them the right way.       Have your action plan and medication list with you when you talk with your doctors, pharmacists, and other healthcare providers in your care team.     Ask your doctors, pharmacists, and other healthcare providers to update the action plan and medication list at every visit.     Take your medication list with you if you go to the hospital or emergency room.     Give a copy of the action plan and medication list to your family or caregivers.     If you want to talk about this letter or any of the papers with it, please call   [phone].   We look forward to working with you, your doctors, and other healthcare providers to help you stay healthy.     Sincerely,    Sheri Elizondo      Enclosed: Medication Action Plan and Personal Medication List    MEDICATION ACTION PLAN FOR Kavitha Headley,  1945     This action plan will help you get the best results from your medications if you:   1. Read \"What we talked about.\"   2. Take the steps listed in the \"What I need to do\" boxes.   3. Fill in \"What I did and when I did it.\"   4. Fill in \"My follow-up plan\" and \"Questions I want to ask.\"     Have this action plan with you when you talk with your doctors, pharmacists, and other healthcare providers in your care team. Share this with your family or caregivers " too.  DATE PREPARED: 3/1/2018  What we talked about: It is important that we find an alternative option for sleep aside from temazepam but it is likely that tapering down from 15mg to 7.5mg might be too fast.                                                  What I need to do: Wait until you here back from PATRICIA Jalloh or myself about whether you could first move to alternating nightly doses of 15mg and 7.5mg for two weeks prior to going down to 7.5mg nightly.       What I did and when I did it:                                              My follow-up plan:                 Questions I want to ask:              If you have any questions about your action plan, call Sheri Elizondo, Phone: [phone] , Monday-Friday 8-4:30pm.           MEDICATION LIST FOR Kavitha Headley  1945     This medication list was made for you after we talked. We also used information from your doctor's chart.      Use blank rows to add new medications. Then fill in the dates you started using them.    Cross out medications when you no longer use them. Then write the date and why you stopped using them.    Ask your doctors, pharmacists, and other healthcare providers to update this list at every visit. Keep this list up-to-date with:       Prescription medications    Over the counter drugs     Herbals    Vitamins    Minerals      If you go to the hospital or emergency room, take this list with you. Share this with your family or caregivers too.     DATE PREPARED: 3/1/2018  Allergies or side effects: Bactrim [sulfamethoxazole w/trimethoprim]; Codeine; Morphine; and Quetiapine     Medication:  B-12 1000 MCG PO TBCR      How I use it:  Take 1,000 mcg by mouth daily      Why I use it: Vitamin B12 deficiency    Prescriber:  PATRICIA Gomez-YOSEF      Date I started using it:       Date I stopped using it:         Why I stopped using it:            Medication:  BETAMETHASONE DIPROPIONATE AUG 0.05 % EX LOTN      How I use it:   Apply to AA on scalp bid for 7-10 days then when needed      Why I use it: Seborrheic dermatitis    Prescriber:  Mica Sinegr MD      Date I started using it:       Date I stopped using it:         Why I stopped using it:            Medication:  CENTRUM SILVER PO TABS      How I use it:  Take 1 tablet by mouth daily      Why I use it:  General health    Prescriber:  Patient Reported      Date I started using it:       Date I stopped using it:         Why I stopped using it:            Medication:  DIPHENHYDRAMINE HCL 25 MG PO CAPS      How I use it:  Take 25 mg by mouth nightly as needed      Why I use it:  Insomnia    Prescriber:  Patient Reported      Date I started using it:       Date I stopped using it:         Why I stopped using it:            Medication:  FOLIC ACID 1 MG PO TABS      How I use it:  Take 1 tablet (1 mg) by mouth daily      Why I use it: Alcohol abuse    Prescriber:  Nan Macias DO      Date I started using it:       Date I stopped using it:         Why I stopped using it:            Medication:  FUROSEMIDE 20 MG PO TABS      How I use it:  Take 1 tablet (20 mg) by mouth as needed (taking ~ 1x/weekly as needed for swelling)      Why I use it: Benign hypertension    Prescriber:  Nayeli Castorena PA-C      Date I started using it:       Date I stopped using it:         Why I stopped using it:            Medication:  GABAPENTIN 300 MG PO CAPS      How I use it:  Take 1 capsule (300 mg) by mouth 3 times daily      Why I use it: Chronic pain syndrome    Prescriber:  Alison Pena MD      Date I started using it:       Date I stopped using it:         Why I stopped using it:            Medication:  KETOCONAZOLE 2 % EX SHAM      How I use it:  Apply to the affected area and wash off after 5 minutes.      Why I use it: Tinea capitis    Prescriber:  Naylei Castorena PA-C      Date I started using it:       Date I stopped using it:         Why I stopped using it:             Medication:  MAGNESIUM OXIDE 400 MG PO TABS      How I use it:  Take 1 tablet (400 mg) by mouth daily      Why I use it: Low magnesium levels    Prescriber:  Nan Macias, DO      Date I started using it:       Date I stopped using it:         Why I stopped using it:            Medication:  MELATONIN 3 MG PO TABS      How I use it:  Take 2 tablets (6 mg) by mouth nightly as needed for sleep      Why I use it:  Insomnia    Prescriber:  Provider Abstract      Date I started using it:       Date I stopped using it:         Why I stopped using it:            Medication:  POLYETHYLENE GLYCOL 3350 PO PACK      How I use it:  Take 17 g by mouth daily as needed (constipation)      Why I use it: Constipation, unspecified constipation type    Prescriber:  Nan Macias, DO      Date I started using it:       Date I stopped using it:         Why I stopped using it:            Medication:  PREDNISONE 20 MG PO TABS      How I use it:  Take 60 mg daily for 3 days, then 40 mg daily for 3 days, then 20 mg daily for 3 days, then 10 mg for 4 days      Why I use it: Rash    Prescriber:  Nayeli Castorena PA-C      Date I started using it:       Date I stopped using it:         Why I stopped using it:            Medication:  SENNOSIDES-DOCUSATE SODIUM 8.6-50 MG PO TABS      How I use it:  Take 1-2 tablets by mouth 2 times daily as needed for constipation      Why I use it: Constipation, unspecified constipation type    Prescriber:  Nan Macias, DO      Date I started using it:       Date I stopped using it:         Why I stopped using it:            Medication:  SPIRONOLACTONE 100 MG PO TABS      How I use it:  100 mg daily      Why I use it:  Blood Pressure    Prescriber:  Patient Reported      Date I started using it:       Date I stopped using it:         Why I stopped using it:            Medication:  SUCRALFATE 1 GM/10ML PO SUSP      How I use it:  Take 10 mLs (1 g) by mouth 4 times daily  (before meals and nightly) Pt takes once daily      Why I use it: Esophagitis    Prescriber:  Provider Abstract      Date I started using it:       Date I stopped using it:         Why I stopped using it:            Medication:  TEMAZEPAM 7.5 MG PO CAPS      How I use it:  Alternate taking nightly doses of 7.5mg (1 tablet) and 15mg ( 2 tablets) for two weeks, then take 7.5mg nightly for two weeks (RX must last 30 days)      Why I use it: Thrombocytopenia (H); Liver disease, chronic, due to alcohol (H)    Prescriber:  Nayeli Castorena PA-C      Date I started using it:       Date I stopped using it:         Why I stopped using it:            Medication:  THIAMINE  MG PO TABS      How I use it:  Take 1 tablet (100 mg) by mouth daily      Why I use it: Alcohol abuse    Prescriber:  Nan Macias DO      Date I started using it:       Date I stopped using it:         Why I stopped using it:            Medication:  TRAZODONE  MG PO TABS      How I use it:  Take 1 tablet (100 mg) by mouth nightly as needed for sleep      Why I use it: Insomnia, unspecified type    Prescriber:  Nayeli Castorena PA-C      Date I started using it:       Date I stopped using it:         Why I stopped using it:            Medication:  TRIAMCINOLONE ACETONIDE 0.1 % EX CREA      How I use it:  Apply to AA on the upper chest and upper back bid for 10-14 days      Why I use it: Dermatitis    Prescriber:  Mica Singer MD      Date I started using it:       Date I stopped using it:         Why I stopped using it:            Medication:  VALACYCLOVIR HCL 1 G PO TABS      How I use it:  TAKE 2 TABS EVERY 12 HRS FOR 1 DAY ONLY FOR OUTBREAKS OF COLD SORES as needed      Why I use it: Herpes simplex virus infection    Prescriber:  Alison Pena MD      Date I started using it:       Date I stopped using it:         Why I stopped using it:            Medication:  VORTIOXETINE HBR 10 MG PO TABS      How I use it:   Take 1 tablet (10 mg) by mouth daily      Why I use it: Depression, unspecified depression type    Prescriber:  Nayeli Castorena PA-C      Date I started using it:       Date I stopped using it:         Why I stopped using it:            Medication:         How I use it:         Why I use it:      Prescriber:         Date I started using it:       Date I stopped using it:         Why I stopped using it:            Medication:         How I use it:         Why I use it:      Prescriber:         Date I started using it:       Date I stopped using it:         Why I stopped using it:            Medication:         How I use it:         Why I use it:      Prescriber:         Date I started using it:       Date I stopped using it:         Why I stopped using it:              Other Information:     If you have any questions about your action plan, call [phone].    According to the Paperwork Reduction Act of 1995, no persons are required to respond to a collection of information unless it displays a valid OMB control number. The valid OMB number for this information collection is [id number]. The time required to complete this information collection is estimated to average 40 minutes per response, including the time to review instructions, searching existing data resources, gather the data needed, and complete and review the information collection. If you have any comments concerning the accuracy of the time estimate(s) or suggestions for improving this form, please write to: CMS, Attn: BREANA Reports Clearance Officer, [address] [place] [phone].

## 2018-02-27 ENCOUNTER — ALLIED HEALTH/NURSE VISIT (OUTPATIENT)
Dept: PHARMACY | Facility: CLINIC | Age: 73
End: 2018-02-27
Payer: COMMERCIAL

## 2018-02-27 DIAGNOSIS — D69.6 THROMBOCYTOPENIA (H): ICD-10-CM

## 2018-02-27 DIAGNOSIS — F51.04 CHRONIC INSOMNIA: ICD-10-CM

## 2018-02-27 DIAGNOSIS — F10.10 ALCOHOL ABUSE: ICD-10-CM

## 2018-02-27 DIAGNOSIS — F32.A DEPRESSION, UNSPECIFIED DEPRESSION TYPE: Primary | ICD-10-CM

## 2018-02-27 DIAGNOSIS — K70.9 LIVER DISEASE, CHRONIC, DUE TO ALCOHOL (H): ICD-10-CM

## 2018-02-27 DIAGNOSIS — F41.9 ANXIETY: ICD-10-CM

## 2018-02-27 PROCEDURE — 99207 ZZC NO CHARGE LOS: CPT | Performed by: PHARMACIST

## 2018-02-27 RX ORDER — TEMAZEPAM 7.5 MG/1
CAPSULE ORAL
Qty: 37 CAPSULE | Refills: 1 | Status: SHIPPED | OUTPATIENT
Start: 2018-02-27 | End: 2018-03-21

## 2018-02-27 NOTE — TELEPHONE ENCOUNTER
----- Message from Nayeli Castorena PA-C sent at 2/26/2018  4:37 PM CST -----  Regarding: RE: Meds  Sheri-  I think that is a good plan.  Could you aura up the medication orders and I can sign them with the appropriate taper?      I really appreciate your help!    Nayeli Castorena, MS, CLEO  [facility]    ----- Message -----     From: Sheri Elizondo, [facility]     Sent: 2/22/2018   4:30 PM       To: Nayeli Castorena PA-C  Subject: Meds                                             Olga Castorena, thank you so much for the referral. I had the pleasure of meeting with Linda today and will have a more organized general plan and note to you by Tuesday when I return to clinic but wanted to touch base more promptly about the temazepam. I 100% support the plan to taper down/off the medication with this patient's ongoing alcoholism (she did let me know today that she did drink last night). I would propose that in hopes of reducing the chance of worsening alcoholism, we drop the dose a little slower. Are you open to alternating 15mg and 7.5mg nightly doses for a couple weeks? If so please let me know and I can pend you an order Tuesday (or just send the order). Generally we recommend dropping benzo doses by no more than 20% per week to avoid withdrawal. Thank you for your consideration!  -Sheri Elizondo, PharmD  Medication Therapy Management Provider  Phone: [phone]  [email or web address]

## 2018-02-27 NOTE — PATIENT INSTRUCTIONS
Recommendations from today's MTM visit:                                                      1. I sent an order to Nayeli Castorena and she has approved it for you to take temazepam nightly alternating 7.5mg and 15mg doses for two weeks (7.5mg the first night, then 15mg the next, etc).    2. Go to [employer] to get set up with a psychologist within the next week or two. Speaking with someone about managing anxiety with alcoholism can be very effective.    3. Call your insurance plan to see if you are currently paying down a deductible. If so, ask them how much your Brintellix will cost after the deductible is paid off.    Next MTM visit: I will call again Tuesday to see how things are going.    To schedule another MTM appointment, please call the clinic directly or you may call the MTM scheduling line at [phone] or toll-free at [phone].     My Clinical Pharmacist's contact information:                                                      It was a pleasure seeing you today!  Please feel free to contact me with any questions or concerns you have.      Sheri Elizondo PharmD  Medication Therapy Management Provider  Phone: [phone]  [email or web address]    You may receive a survey about the MTM services you received.  I would appreciate your feedback to help me serve you better in the future. Please fill it out and return it when you can. Your comments will be anonymous.

## 2018-02-27 NOTE — PROGRESS NOTES
SUBJECTIVE/OBJECTIVE:                Kavitha Headley is a 72 year old female called for a follow-up visit for Medication Therapy Management.  She was referred to me from Nayeli Castorena.     Chief Complaint: Follow up from our visit on 2/22.  Calling patient to follow up on med questions from last time.    Tobacco: No tobacco use  Alcohol: Current struggle with alcohol dependence. Last beverage was two nights ago.    Medication Adherence/Access:  no issues reported    Depression: Pt continues on Brintellix 10mg daily. She feels the Brintellix is working without side effects right now but is still not sure whether or not she will continue to have a $600 copay if she stays on it. She would still be open to going back to mirtazapine if cost continues to be high but would rather Brintellix if it were affordable.   Historically pt says she has tried most SSRIs including Zoloft and Celexa (made her feel hyper) as well as Effexor.    Anxiety/Insomnia/Alcoholism: Pt continues on temazepam 15mg, trazodone 100mg, melatonin 6mg, and magnesium nightly. Since our last visit, Nayeli Castorena agreed to a slower taper of temazepam of 7.5mg and 15mg doses alternating for 2 weeks before dropping to 7.5mg nightly.   Sunday was Pt's last drink but she does not voice confidence that she will for sure be committing to sobriety at this time. Pt is wondering what options she has if she needs to take something for anxiety now that she is coming off of temazepam. When asked about  resources, she says she was referred to a therapist by Nayeli Castorena but that when she called, this person is booked out until April.    Current labs include:  Today's Vitals: There were no vitals taken for this visit.  BP Readings from Last 3 Encounters:   02/22/18 123/82   02/12/18 116/76   02/05/18 132/78     Lab Results   Component Value Date    A1C 5.7 09/29/2010   .  Lab Results   Component Value Date    CHOL 232 05/11/2017     Lab Results   Component Value Date     TRIG 98 05/11/2017     Lab Results   Component Value Date     05/11/2017     Lab Results   Component Value Date    LDL 98 05/11/2017       Liver Function Studies -   Recent Labs   Lab Test  02/05/18   1252   PROTTOTAL  7.5   ALBUMIN  4.0   BILITOTAL  0.5   ALKPHOS  102   AST  73*   ALT  62*       Lab Results   Component Value Date    UCRR 177 06/13/2017    MICROL 18 07/28/2015    UMALCR 16.40 07/28/2015       Last Basic Metabolic Panel:  Lab Results   Component Value Date     02/05/2018      Lab Results   Component Value Date    POTASSIUM 3.9 02/05/2018     Lab Results   Component Value Date    CHLORIDE 95 02/05/2018     Lab Results   Component Value Date    BUN 12 02/05/2018     Lab Results   Component Value Date    CR 1.00 02/05/2018     GFR Estimate   Date Value Ref Range Status   02/05/2018 54 (L) >60 mL/min/1.7m2 Final     Comment:     Non  GFR Calc   09/25/2017 61 >60 mL/min/1.7m2 Final   08/03/2017 54 (L) >60 mL/min/1.7m2 Final     Comment:     Non  GFR Calc     GFR Estimate If Black   Date Value Ref Range Status   02/05/2018 66 >60 mL/min/1.7m2 Final     Comment:      GFR Calc   09/25/2017 74 >60 mL/min/1.7m2 Final   08/03/2017 65 >60 mL/min/1.7m2 Final     Comment:      GFR Calc     TSH   Date Value Ref Range Status   02/05/2018 1.40 0.40 - 4.00 mU/L Final   ]    Most Recent Immunizations   Administered Date(s) Administered     Influenza (High Dose) 3 valent vaccine 10/06/2016     Influenza (IIV3) PF 10/11/2012     Influenza Vaccine IM 3yrs+ 4 Valent IIV4 10/08/2015     Mantoux Tuberculin Skin Test 01/29/2016     Pneumo Conj 13-V (2010&after) 07/02/2015     Pneumococcal 23 valent 12/29/2008     TD (ADULT, 7+) 04/28/2004     TDAP Vaccine (Adacel) 07/10/2014       ASSESSMENT:              Current medications were reviewed today as discussed above.      Medication Adherence: good, no issues identified    Depression: Improving.  Discussed with patient that it is possible that she was paying down her deductible with the first fill of Brintellix so it may be cheaper the next time she fills. We discussed that in reviewing recommendations for depression in early stages of cirrhosis, either medication would be likely safe as long as we watch LFTs with the mirtazapine restart to rule that out as a cause for the elevations when she was on it in the hospital.    Anxiety/Insomnia/Alcoholism: Needs improvement. Pt would benefit from sooner scheduled visit with behavioral health if at all possible, as we discussed that this would be preferable therapy for her anxiety to benzodiazepines due to her ongoing alcoholism.     PLAN:                  1. Pt to call her insurance plan for explanation of her benefits to determine what her cost for Brintellix will be next fill (in 2 months).    2. Pt encouraged to find a more flexible therapist on [employer] as advised by Nayeli Castorena. Pt is open to this.    3. Order for temazepam taper pended to Nayeli Castorena for approval (this was signed at time of completing this note).    I spent 30 minutes with this patient today. All changes were made via collaborative practice agreement with Nayeli Castorena. A copy of the visit note was provided to the patient's primary care provider.     Will follow up in 1 week to touch base on insurance coverage, make sure patient found a therapist.    The patient was mailed a summary of these recommendations as an after visit summary.    Sheri Elizondo PharmD  Medication Therapy Management Provider  Phone: [phone]  [email or web address]

## 2018-02-27 NOTE — TELEPHONE ENCOUNTER
Rx signed.  Please call pharmacy and d/c any old refills of this RX on file.       Nayeli Castorena MS, PA-C

## 2018-02-27 NOTE — TELEPHONE ENCOUNTER
Order pended as discussed below. Patient contacted today and advised that she should see an order within the next couple days. See my MTM note from today for further discussion.    Leda KimbleD  Medication Therapy Management Provider  Phone: [phone]  [email or web address]

## 2018-02-27 NOTE — TELEPHONE ENCOUNTER
Pharmacy advised.  Patient has not picked up previous refills on file.  Refills cancelled that were on file.  New script faxed.    EMILIA Conrad, RN, N  [facility]) [phone]

## 2018-02-27 NOTE — MR AVS SNAPSHOT
After Visit Summary   2/27/2018    Kavitha Headley    MRN: [id number]           Patient Information     Date Of Birth          1945        Visit Information        Provider Department      2/27/2018 1:30 PM Sheri Elizondo [facility] MTM        Care Instructions    Recommendations from today's MTM visit:                                                      1. I sent an order to Nayeli Castorena and she has approved it for you to take temazepam nightly alternating 7.5mg and 15mg doses for two weeks (7.5mg the first night, then 15mg the next, etc).    2. Go to [employer] to get set up with a psychologist within the next week or two. Speaking with someone about managing anxiety with alcoholism can be very effective.    3. Call your insurance plan to see if you are currently paying down a deductible. If so, ask them how much your Brintellix will cost after the deductible is paid off.    Next MTM visit: I will call again Tuesday to see how things are going.    To schedule another MTM appointment, please call the clinic directly or you may call the MTM scheduling line at [phone] or toll-free at [phone].     My Clinical Pharmacist's contact information:                                                      It was a pleasure seeing you today!  Please feel free to contact me with any questions or concerns you have.      Sheri Elizondo PharmD  Medication Therapy Management Provider  Phone: [phone]  [email or web address]    You may receive a survey about the MTM services you received.  I would appreciate your feedback to help me serve you better in the future. Please fill it out and return it when you can. Your comments will be anonymous.            Follow-ups after your visit        Your next 10 appointments already scheduled     Mar 06, 2018  1:00 PM CST   TELEMEDICINE with Sheri Elizondo [facility] MTM ([facility]  [place])    [address] [place] [address]   [phone]           Note: this is not an onsite visit; there is no need to come to the facility.            Mar 06, 2018  2:00 PM CST   Office Visit with Mica Singer MD   [facility]    [address] [place] [address]   [phone]           Bring a current list of meds and any records pertaining to this visit. For Physicals, please bring immunization records and any forms needing to be filled out. Please arrive 10 minutes early to complete paperwork.            Mar 26, 2018  1:30 PM CDT   Return Visit with  Oncology Nurse   [facility] ([facility])    [facility]  [address] [facility] [address]  [facility] [address] [phone]            Mar 26, 2018  2:00 PM CDT   CT PELVIS W CONTRAST with SHCT1   [facility] ([facility])    [address] [facility] [address] [phone]           Please bring any scans or X-rays taken at other hospitals, if similar tests were done. Also bring a list of your medicines, including vitamins, minerals and over-the-counter drugs. It is safest to leave personal items at home.  Be sure to tell your doctor:   If you have any allergies.   If there s any chance you are pregnant.   If you are breastfeeding.  You may have contrast for this exam. To prepare:   Do not eat or drink for 2 hours before your exam. If you need to take medicine, you may take it with small sips of water. (We may ask you to take liquid medicine as well.)   The day before your exam, drink extra fluids at least six 8-ounce glasses (unless your doctor tells you to restrict your fluids).   You will be given instructions on how to drink the contrast.  Patients over 70 or patients with diabetes or kidney problems:   If you haven t had a blood test (creatinine test) within the last 30  days, the Cardiologist/Radiologist may require you to get this test prior to your exam.  If you have diabetes:   Continue to take your metformin medication on the day of your exam  Please wear loose clothing, such as a sweat suit or jogging clothes. Avoid snaps, zippers and other metal. We may ask you to undress and put on a hospital gown.  If you have any questions, please call the Imaging Department where you will have your exam.            Mar 29, 2018  2:00 PM CDT   Return Visit with Nahum Pa MD   [facility] ([facility])    [facility]  [address]  [facility] [id number] [phone]            Apr 26, 2018  1:15 PM CDT   (Arrive by 1:00 PM)   New Visit with Jessica Kramer NP   [facility] ([facility]    [address]  [address]  [facility] [address] [phone]              Who to contact     If you have questions or need follow up information about today's clinic visit or your schedule please contact [facility] MTM directly at [phone].  Normal or non-critical lab and imaging results will be communicated to you by [employer], letter or phone within 4 business days after the clinic has received the results. If you do not hear from us within 7 days, please contact the clinic through [employer] or phone. If you have a critical or abnormal lab result, we will notify you by phone as soon as possible.  Submit refill requests through [employer] or call your pharmacy and they will forward the refill request to us. Please allow 3 business days for your refill to be completed.          Additional Information About Your Visit        [employer] Information     [employer] gives you secure access to your electronic health record. If you see a primary care provider, you can also send messages to your care team and make appointments. If you have questions, please call your primary care clinic.   If you do not have a primary care provider, please call [phone] and they will assist you.        Care EveryWhere ID     This is your Care EveryWhere ID. This could be used by other organizations to access your [place] medical records  [id number]         Blood Pressure from Last 3 Encounters:   02/22/18 123/82   02/12/18 116/76   02/05/18 132/78    Weight from Last 3 Encounters:   02/22/18 186 lb (84.4 kg)   02/12/18 183 lb 6 oz (83.2 kg)   02/05/18 184 lb 8 oz (83.7 kg)              Today, you had the following     No orders found for display         Today's Medication Changes          These changes are accurate as of 2/27/18 11:59 PM.  If you have any questions, ask your nurse or doctor.               These medicines have changed or have updated prescriptions.        Dose/Directions    temazepam 7.5 MG capsule   Commonly known as:  RESTORIL   This may have changed:    - how much to take  - how to take this  - when to take this  - reasons to take this  - additional instructions   Used for:  Thrombocytopenia (H), Liver disease, chronic, due to alcohol (H)   Changed by:  Sheri Elizondo, [facility]        Alternate taking nightly doses of 7.5mg (1 tablet) and 15mg ( 2 tablets) for two weeks, then take 7.5mg nightly for two weeks (RX must last 30 days)   Quantity:  37 capsule   Refills:  1            Where to get your medicines      Some of these will need a paper prescription and others can be bought over the counter.  Ask your nurse if you have questions.     Bring a paper prescription for each of these medications     temazepam 7.5 MG capsule                Primary Care Provider Office Phone # Fax #    Nayeli Castorena PA-C [phone]       [address] [address]        Equal Access to Services     PATRICIA BUSH AH: Lianne Barrios, melissa farrell, qaybta kaalsky danielson. So [facility]  [phone].    ATENCIÓN: Si sol carreon, tiene a gaytan disposición servicios gratuitos de asistencia lingüística. Chelsi espinal [phone].    We comply with applicable federal civil rights laws and [place] laws. We do not discriminate on the basis of race, color, national origin, age, disability, sex, sexual orientation, or gender identity.            Thank you!     Thank you for choosing [facility]  for your care. Our goal is always to provide you with excellent care. Hearing back from our patients is one way we can continue to improve our services. Please take a few minutes to complete the written survey that you may receive in the mail after your visit with us. Thank you!             Your Updated Medication List - Protect others around you: Learn how to safely use, store and throw away your medicines at www.disposemymeds.org.          This list is accurate as of 2/27/18 11:59 PM.  Always use your most recent med list.                   Brand Name Dispense Instructions for use Diagnosis    B-12 1000 MCG Tbcr     100 tablet    Take 1,000 mcg by mouth daily    Vitamin B12 deficiency       betamethasone (augmented) 0.05 % lotion    DIPROLENE    60 mL    Apply to AA on scalp bid for 7-10 days then when needed    Seborrheic dermatitis       CENTRUM SILVER per tablet      Take 1 tablet by mouth daily        diphenhydrAMINE 25 MG capsule    BENADRYL     Take 25 mg by mouth nightly as needed        folic acid 1 MG tablet    FOLVITE    30 tablet    Take 1 tablet (1 mg) by mouth daily    Alcohol abuse       furosemide 20 MG tablet    LASIX    90 tablet    Take 1 tablet (20 mg) by mouth as needed (taking ~ 1x/weekly as needed for swelling)    Benign hypertension       gabapentin 300 MG capsule    NEURONTIN    270 capsule    Take 1 capsule (300 mg) by mouth 3 times daily    Chronic pain syndrome       ketoconazole 2 % shampoo    NIZORAL    120 mL    Apply to the affected area and wash off after 5  minutes.    Tinea capitis       magnesium oxide 400 MG tablet    MAG-OX    7 tablet    Take 1 tablet (400 mg) by mouth daily    Low magnesium levels       melatonin 3 MG tablet      Take 2 tablets (6 mg) by mouth nightly as needed for sleep        polyethylene glycol Packet    MIRALAX/GLYCOLAX    7 packet    Take 17 g by mouth daily as needed (constipation)    Constipation, unspecified constipation type       predniSONE 20 MG tablet    DELTASONE    20 tablet    Take 60 mg daily for 3 days, then 40 mg daily for 3 days, then 20 mg daily for 3 days, then 10 mg for 4 days    Rash       senna-docusate 8.6-50 MG per tablet    SENOKOT-S;PERICOLACE    100 tablet    Take 1-2 tablets by mouth 2 times daily as needed for constipation    Constipation, unspecified constipation type       spironolactone 100 MG tablet    ALDACTONE     100 mg daily        sucralfate 1 GM/10ML suspension    CARAFATE    1200 mL    Take 10 mLs (1 g) by mouth 4 times daily (before meals and nightly) Pt takes once daily    Esophagitis       temazepam 7.5 MG capsule    RESTORIL    37 capsule    Alternate taking nightly doses of 7.5mg (1 tablet) and 15mg ( 2 tablets) for two weeks, then take 7.5mg nightly for two weeks (RX must last 30 days)    Thrombocytopenia (H), Liver disease, chronic, due to alcohol (H)       thiamine 100 MG tablet      Take 1 tablet (100 mg) by mouth daily    Alcohol abuse       traZODone 100 MG tablet    DESYREL    90 tablet    Take 1 tablet (100 mg) by mouth nightly as needed for sleep    Insomnia, unspecified type       triamcinolone 0.1 % cream    KENALOG    80 g    Apply to AA on the upper chest and upper back bid for 10-14 days    Dermatitis       valACYclovir 1000 mg tablet    VALTREX    4 tablet    TAKE 2 TABS EVERY 12 HRS FOR 1 DAY ONLY FOR OUTBREAKS OF COLD SORES as needed    Herpes simplex virus infection       vortioxetine 10 MG tablet    BRINTELLIX    90 tablet    Take 1 tablet (10 mg) by mouth daily    Depression,  unspecified depression type

## 2018-03-06 ENCOUNTER — TELEPHONE (OUTPATIENT)
Dept: PHARMACY | Facility: CLINIC | Age: 73
End: 2018-03-06

## 2018-03-06 NOTE — TELEPHONE ENCOUNTER
Called patient to ask about insurance and she has not called yet. She reports that she has also not been able to drop her temazepam dose to 7.5mg successfully. She has tried for a week but each night she can't sleep so in frustration she takes a second 7.5mg tablet later in the evening and wakes up feeling very drowsy.    I encouraged pt to follow up with PCP as this will throw off her prescription. Pt says she will make an appointment. She has not talked to her insurance yet so we will follow up on that and on whether or not she is scheduled with Nayeli when I call on Thursday.    Pt reminded that the priorities are 1. EtOH sobriety and 2. Temazepam taper, so we do not want her drinking to worsen in response to the attempt to decrease her benzodiazepines. She is receptive to this.    Sheri Elizondo, LedaD  Medication Therapy Management Provider  Phone: [phone]  [email or web address]

## 2018-03-08 ENCOUNTER — TELEPHONE (OUTPATIENT)
Dept: PHARMACY | Facility: CLINIC | Age: 73
End: 2018-03-08

## 2018-03-08 NOTE — TELEPHONE ENCOUNTER
Called and left message with patient to remind her to schedule with PATRICIA Jalloh and encouraged her to call me back.    Sheri Elizondo PharmD  Medication Therapy Management Provider  Phone: [phone]  [email or web address]

## 2018-03-16 ENCOUNTER — TELEPHONE (OUTPATIENT)
Dept: FAMILY MEDICINE | Facility: CLINIC | Age: 73
End: 2018-03-16

## 2018-03-16 NOTE — TELEPHONE ENCOUNTER
Reid from [facility] calling  Patient has requested a lower tier for her vortioxetine (TRINTELLIX) 10 MG tablet  They are wanting to know if patient has tried any other meds with that same diagnosis  Please call  [phone] Option 3  Ref#[id number]  Jennifer ROGERS

## 2018-03-19 ENCOUNTER — TELEPHONE (OUTPATIENT)
Dept: FAMILY MEDICINE | Facility: CLINIC | Age: 73
End: 2018-03-19

## 2018-03-19 NOTE — TELEPHONE ENCOUNTER
Reason for Call:  Other prescription    Detailed comments: [employer] [phone] -option 3. Ref # [id number]. Verify medications previously taken to fill new medication for patient. Prior Auth    Phone Number Patient can be reached at: Other phone number:  [phone] option 3    Best Time: any    Can we leave a detailed message on this number? Not Applicable    Call taken on 3/19/2018 at 9:48 AM by Debra Redd

## 2018-03-19 NOTE — TELEPHONE ENCOUNTER
PA approved.  Effective date: 1/1/2018-3/19/2019  PA reference #: [id number]  Pt. notified:   Yes   LMOM that PA approved and patient to notify pharmacy.  Sent form to scan.

## 2018-03-19 NOTE — TELEPHONE ENCOUNTER
PA approved.  Effective date: 1/1/2018-3/19/2019  PA reference #: [id number]  Pt. notified:   Yes   LMOM that PA approved and patient to notify pharmacy.

## 2018-03-19 NOTE — TELEPHONE ENCOUNTER
I spoke with [employer] and advised them that patient has tried Bupropion, Citalopram, Duloxetine, Lexapro, Paroxetine, Seroquel, Sertraline and Venlafaxine.      Ember Hammer, BS, RN, N  [facility]) [phone]

## 2018-03-21 ENCOUNTER — TELEPHONE (OUTPATIENT)
Dept: FAMILY MEDICINE | Facility: CLINIC | Age: 73
End: 2018-03-21

## 2018-03-21 DIAGNOSIS — K70.9 LIVER DISEASE, CHRONIC, DUE TO ALCOHOL (H): ICD-10-CM

## 2018-03-21 DIAGNOSIS — D69.6 THROMBOCYTOPENIA (H): ICD-10-CM

## 2018-03-21 NOTE — TELEPHONE ENCOUNTER
Routing to PCP for further review/recommendations/orders. See notes, PA approved today.  Merlene Ibarra RN  [place] [facility]

## 2018-03-21 NOTE — TELEPHONE ENCOUNTER
PA approved.  Effective date: 1/1/2018-3/16/2019  PA reference #: [id number]  Pt. notified:   Yes   LMOM that PA approved and patient to notify pharmacy.  Sent forms to scan  Saritha Quintana CMA

## 2018-03-21 NOTE — TELEPHONE ENCOUNTER
Reason for Call:  Medication temazepam (RESTORIL) 7.5 MG capsuletemazepam (RESTORIL) 7.5 MG capsule    Do you use a [facility]?  Name of the pharmacy and phone number for the current request:        [facility][id number] - [place], [place] - [address]            Name of the medication requested: temazepam (RESTORIL) 7.5 MG capsule    Other request: Patient didn't read directions needs seven pills until the refill is available to her through the pharmacy, is this possible as she is all out today?    Can we leave a detailed message on this number? YES    Phone number patient can be reached at: Home number on file [phone] (home)    Best Time: anytime    Call taken on 3/21/2018 at 1:16 PM by Flory Donovan

## 2018-03-22 RX ORDER — TEMAZEPAM 7.5 MG/1
CAPSULE ORAL
Qty: 5 CAPSULE | Refills: 0 | Status: SHIPPED | OUTPATIENT
Start: 2018-03-22 | End: 2018-03-22

## 2018-03-22 NOTE — TELEPHONE ENCOUNTER
LP's rx states no early refill, pharmacy concerned over withdrawal potential, rx done for 5 only, LP to review on return    Local provider to sign

## 2018-03-22 NOTE — TELEPHONE ENCOUNTER
Call received from pharmacy, states med fax received, but requires directions, and NEHAL #.  Also states they require a phone call from the clinic indicating it is okay to fill early.  Noted previously documented concern from pharmacist, over possible withdrawal starting this evening if med not able to be filled.  Last noted medication directions:  7.5 mg nightly, patient on med since at least 11/28/18 as noted in EPIC.  Clinic had told patient initially she needed to wait for PCP to return to clinic (3/27/18), but other clinic provider did fill small script today (#5) 7.5 mg pills, per pharmacist concerns.  Did page on call provider, Dr. Bailey to [facility].  Per Dr. Bailey, okay to refill under her name/NEHAL # for now, and okay to fill early, with most recent directions of 7.5 mg nightly.  Did give verbal order to pharmacist at 6:16 pm.   Forwarding to Dr. Harris per Dr. Bailey's request as f/u.      Ros Ashley RN  FNA

## 2018-03-22 NOTE — TELEPHONE ENCOUNTER
"Called patient and explained she will need to wait until LP gets back in office&until she is due for refill on 3/27/2018 for refill. She said \"okay whatever bye\"  Saritha Quintana, [facility]    "

## 2018-03-22 NOTE — TELEPHONE ENCOUNTER
Rx signed by CLEO MARTIN  Faxed to [facility] in [place] @ [phone]    Routing to CLEO RUIZ to review upon return    Sara Williamson RN  [place] [facility]

## 2018-03-22 NOTE — TELEPHONE ENCOUNTER
This was routed to Dr. Harris.  Will wait is response.    Ember Hammer, BS, RN, N  [facility]  Ph) [phone]

## 2018-03-22 NOTE — TELEPHONE ENCOUNTER
Called patient to follow up from my last discussion with her on whether or not she was able to get in to see Nayeli Castorena. She says she took her last dose of temazepam on Tuesday 3/20 evening and is out. While I understand the hesitancy to sign orders for extra temazepam, I would strongly encourage follow up with this patient tomorrow 3/23 to make sure she is not having any concerning withdrawal symptoms or worsening of alcoholism, as any potential withdrawal symptoms will likely start this evening.    I will contact patient again on Tuesday to follow up with her as I am not in clinic Friday or Monday.    Sheri Elizondo, PharmD  Medication Therapy Management Provider  Phone: [phone]  [email or web address]

## 2018-03-23 RX ORDER — TEMAZEPAM 7.5 MG/1
CAPSULE ORAL
Qty: 7 CAPSULE | Refills: 0 | Status: SHIPPED | OUTPATIENT
Start: 2018-03-23 | End: 2018-08-03

## 2018-03-23 NOTE — TELEPHONE ENCOUNTER
Done per Nayeli Castorena PA-C 's notes. rx at [facility], please bring to me later this am when back in clinic and I'll sign.  Please assist pt in making appt to be seen.

## 2018-03-23 NOTE — TELEPHONE ENCOUNTER
Rx from [facility] has been shredded    Routing to CLEO RUIZ to review upon return.     Sara Williamson RN  [place] [facility]

## 2018-03-27 NOTE — TELEPHONE ENCOUNTER
Pt called and advised to schedule with PATRICIA Jalloh (I had planned to call her today anyway to make sure she had seen Naeyli by now). I will reach out in 1 month if I have not heard from the patient or care team.    Leda KimbleD  Medication Therapy Management Provider  Phone: [phone]  [email or web address]

## 2018-03-28 DIAGNOSIS — K70.9 LIVER DISEASE, CHRONIC, DUE TO ALCOHOL (H): ICD-10-CM

## 2018-03-28 DIAGNOSIS — D69.6 THROMBOCYTOPENIA (H): ICD-10-CM

## 2018-03-28 NOTE — LETTER
[facility]  [address] [place] [address]  [place] [address]  [phone]  April 12, 2018    Kavitha Headley  [address] [facility] [address]    Dear Linda,    I care about your health and have reviewed your health plan. I have reviewed your medical conditions, medication list, and lab results and am making recommendations based on this review, to better manage your health.    You are in particular need of attention regarding:  -medication check    I am recommending that you:  -schedule a FOLLOWUP OFFICE APPOINTMENT with me.         Here is a list of Health Maintenance topics that are due now or due soon:  Health Maintenance Due   Topic Date Due     URINE DRUG SCREEN Q1 YR  10/16/1960     Wellness Visit with your Primary Provider - yearly  09/21/2010     Microalbumin Lab - yearly  07/28/2016     Mammogram - every 2 years  12/22/2016       Please call us at [phone] (or use [employer]) to address the above recommendations.     Thank you for trusting [facility] and we appreciate the opportunity to serve you.  We look forward to supporting your healthcare needs in the future.    Healthy Regards,      Nayeli Castorena PA-C

## 2018-03-28 NOTE — TELEPHONE ENCOUNTER
Requested Prescriptions   Pending Prescriptions Disp Refills     temazepam (RESTORIL) 7.5 MG capsule      Last Written Prescription Date:  3.23.18  Last Fill Quantity: 7 capsule,  # refills: 0   Last office visit: 2/20/2018 with prescribing provider:     Future Office Visit:   Next 5 appointments (look out 90 days)     Apr 05, 2018  1:45 PM CDT   Return Visit with  Oncology Nurse   [facility]    [facility]  [address]  [facility] [address]   [phone]            Apr 11, 2018  3:00 PM CDT   Return Visit with Nahum Pa MD   [facility]    [facility]  [address] [facility] [address]  [facility] [address]   [phone]                  7 capsule 0     Sig: Take one capsule nightly.  Follow up with Nayeli Castorena PA-C  before any further refills    There is no refill protocol information for this order

## 2018-03-28 NOTE — TELEPHONE ENCOUNTER
Reason for Call:  Medication or medication refill:    Do you use a [facility]?  Name of the pharmacy and phone number for the current request:  [facility]    Name of the medication requested: temazepam 7.5 mg     Other request: na    Can we leave a detailed message on this number? YES    Phone number patient can be reached at: Home number on file [phone] (home)    Best Time: antyime    Call taken on 3/28/2018 at 1:52 PM by Mica Foley

## 2018-03-29 RX ORDER — TEMAZEPAM 7.5 MG/1
CAPSULE ORAL
Qty: 7 CAPSULE | Refills: 0 | OUTPATIENT
Start: 2018-03-29

## 2018-03-29 NOTE — TELEPHONE ENCOUNTER
See note below. Pt to follow up with LP before further refills. Will alert pharmacy.    Routing to rx response bin for appt/follow up.    Merlene bIarra RN  [facility]

## 2018-04-05 ENCOUNTER — TELEPHONE (OUTPATIENT)
Dept: ONCOLOGY | Facility: CLINIC | Age: 73
End: 2018-04-05

## 2018-04-06 NOTE — TELEPHONE ENCOUNTER
Attempt #2  Called [phone] - Left a non-detailed message to call back and speak with any triage nurse.    Sara Williamson RN  [facility]

## 2018-05-20 NOTE — CONSULTS
[facility] Initial Psychiatric Consult Note      TIME SPENT IN PSYCHIATRY INITIAL CONSULT: 55 MINUTES    Consult ordered by: Nik Dejesus MD.  Reason: Depression, anxiety, EtOH.     Initial History     The patient's care was discussed, patient seen and chart notes were reviewed.    Patient examined for psychiatric consultation.     IDENTIFICATION    Pt is a 71 year old engaged  female with four children. Pt sees PCP Alison Macias. She has seen a psychiatrist in the past, but does not currently see one now. She has had one previous CD treatment. Pt seen on 64 for depression, anxiety, and alcohol use.    HISTORY OF PRESENT ILLNESS     Ms. Kavitha Hartman is a pleasant 71-year-old female with past medical history of alcohol abuse, chronic pain syndrome on a pain agreement, depression, and anxiety who presented to [facility] for evaluation of bilateral lower extremity swelling and erythema.  She reported that usually her legs are not swollen, although she does take Lasix at home as needed for leg swelling.  She states that she started having more swelling in her lower extremities 2 weeks ago. She saw her PCP regarding this, but was unable to obtain relief, thus prompting her presentation to [facility]. She states that in August of this past year, she was unable to continue working as nurse and exacerbated her depression and anxiety with this change in her lifestyle. Pt has severely depressed mood, motivation poor, concentration poor, low energy, hopeless, helpless, anhedonic. Pt is an anxious person, a worrier. Pt endorses consistent, pervasive sense of anxiety and worry. Pt endorses symptoms related to excessive alcohol use, including over drinking, unsuccessful attempts to curb use, craving its use, having it interfere with work and personal relationships, and continued use despite known bad consequences. Pt also has increased tolerance. Her LFTs are elevated on admission, consistent with alcohol use.     CHEMICAL  "DEPENDENCY HISTORY    She reports drinking 2-3 cocktails per night.  She denies having history of alcohol withdrawal in the past. She states she had attended CD treatment seven years ago at [place]. Utox not completed on admission.    PAST PSYCHIATRIC HISTORY    She is currently maintained on Remeron 45mg qhs, Trazodone 100mg, Temazepam 15mg, and Gabapentin 900mg. No prior psychiatric hospitalizations.    FAMILY HISTORY    She states that many people in her family have unreported issues with alcohol. Pt reports that her daughter has issues with depression.    SOCIAL HISTORY    Pt grew up in [place] as the middle of five children. Pt's mother passed when she was [age]. Her stepmother was \"the annabelle stepmother.\" She obtained a nursing degree at [employer]. She has been  once. She had four children from this marriage. She reports that marck also uses alcohol and has had two DWIs.     Medications     Prescriptions Prior to Admission   Medication Sig Dispense Refill Last Dose     GABAPENTIN PO Take 600 mg by mouth At Bedtime   6/5/2017 at hs     GABAPENTIN PO Take 300 mg by mouth daily as needed In the morning if needed   prn     cephALEXin (KEFLEX) 500 MG capsule Take 1 capsule (500 mg) by mouth 4 times daily for 10 days 40 capsule 0 6/5/2017     metoclopramide (REGLAN) 5 MG tablet Take 1 tablet (5 mg) by mouth 4 times daily as needed 40 tablet 0 6/5/2017 at x2     minocycline (MINOCIN/DYNACIN) 100 MG capsule Take 1 capsule (100 mg) by mouth 2 times daily for 10 days 20 capsule 0 6/5/2017     furosemide (LASIX) 20 MG tablet Take 1 tablet (20 mg) by mouth daily 90 tablet 1 6/5/2017 at am     traZODone (DESYREL) 100 MG tablet TAKE 1 TABLET (100 MG) BY MOUTH AT BEDTIME 90 tablet 1 6/5/2017 at hs     valACYclovir (VALTREX) 1000 mg tablet TAKE 2 TABS EVERY 12 HRS FOR 1 DAY ONLY FOR OUTBREAKS OF COLD SORES as needed 4 tablet 3 prn     temazepam (RESTORIL) 15 MG capsule TAKE 1 CAPSULES BY MOUTH AT BETIME AS " 31M PMH HIV (dx 2016, noncompliant with [employer], +MSM VL 1.1million on this Admission, CD4 ) presented with fevers chills, neck pain and admitted to [facility] initially for severe sepsis and was ruled out for meningitis. Hospital course complicated by hypotension with persistent fevers and tachycardia for which patient transferred to [facility]. Hypotension responsive to IVF resusitation.  Upon work up of fevers patient found to have intrahepatic/extrahepatic mass with associated lymphadenopathy concerning for lymphoma pending biopsy. ALso found to have neurosyphillis for with patient on PCN G. Patient intermittently refusing care, including IR guided biopsy. Patient transferred back to [facility] in setting of continued refusal of care but persistently febrile and tachycardiac, though BP remains stable. NEEDED TO SLEEP. MUST LAST 30 DAYS. 30 capsule 5 prn     mirtazapine (REMERON) 45 MG tablet Take 1 tablet (45 mg) by mouth At Bedtime 90 tablet 1 6/5/2017 at hs     traMADol (ULTRAM) 50 MG tablet TAKE 1 TABLET BY MOUTH TWICE DAILY AS NEEDED FOR MODERATE PAIN 60 tablet 3 prn     naproxen (NAPROSYN) 500 MG tablet Take 1 tablet (500 mg) by mouth 2 times daily as needed for moderate pain 90 tablet 1 prn     metoprolol (TOPROL XL) 25 MG 24 hr tablet Take 1 tablet (25 mg) by mouth daily 90 tablet 3 6/6/2017 at am     atorvastatin (LIPITOR) 40 MG tablet Take 1 tablet (40 mg) by mouth daily 90 tablet 1 6/5/2017 at hs     Multiple Vitamins-Minerals (CENTRUM SILVER) per tablet Take 1 tablet by mouth daily   6/5/2017     aspirin 81 MG tablet Take 81 mg by mouth daily   6/5/2017 at am     Potassium Chloride ER 20 MEQ TBCR Take 1 tablet (20 mEq) by mouth daily 30 tablet 3 NEW       Scheduled Medications    ceFAZolin  1 g Intravenous Q8H     aspirin EC  81 mg Oral Daily     gabapentin (NEURONTIN) tablet 600 mg  600 mg Oral At Bedtime     gabapentin (NEURONTIN) capsule 300 mg  300 mg Oral Daily     metoprolol  25 mg Oral Daily     mirtazapine  45 mg Oral At Bedtime     multivitamin, therapeutic  1 tablet Oral Daily     traZODone  100 mg Oral At Bedtime     sodium chloride (PF)  3 mL Intracatheter Q8H     enoxaparin  40 mg Subcutaneous Q24H     folic acid  1 mg Oral Daily     vitamin  B-1  50 mg Oral Daily     PRNs:  LORazepam **OR** LORazepam, potassium chloride, potassium chloride, potassium chloride, potassium chloride with lidocaine, potassium chloride, magnesium sulfate, acetaminophen, traMADol, temazepam, naloxone, lidocaine, lidocaine 4%, sodium chloride (PF), ibuprofen, ondansetron **OR** ondansetron      Allergies      Allergies   Allergen Reactions     Bactrim [Sulfamethoxazole W/Trimethoprim] Hives     Codeine Itching     NAUSEA     Morphine Itching     NAUSEA        Previous Medical History     Past Medical History:  "  Diagnosis Date     Alcohol abuse      Anxiety disorder      Bariatric surgery status 1996?     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/2005     Macrocytic anemia      Major depressive disorder, single episode, severe, without mention of psychotic behavior      Mixed hyperlipidemia      Moderate aortic stenosis 5/2014     Pelvic relaxation disorder      Personal history of urinary calculi 6/2006     PVC (premature ventricular contraction)      Stage III chronic kidney disease 2005     SVT (supraventricular tachycardia) (H)         Medical Review of Systems   /70 (BP Location: Left arm)  Pulse 90  Temp 99  F (37.2  C) (Oral)  Resp 18  Ht 1.626 m (5' 4\")  Wt 85.7 kg (188 lb 15 oz)  SpO2 93%  BMI 32.43 kg/m2  Body mass index is 32.43 kg/(m^2).  Previous 10-point ROS completed by Enedelia Rangel MD on 6/7/17 reviewed by Colton Vargas MD on June 9, 2017 and is unchanged except for those problems mentioned within the HPI.      Mental Status Examination     Appearance Lying in bed, dressed in gown. Appears stated age.   Attitude Cooperative   Orientation Oriented to person, place, time   Eye Contact Poor   Speech Regular rate, rhythm, volume and tone   Language Normal   Psychomotor Behavior Normal   Thought Process Goal-Oriented, Intact   Associations Intact   Thought Content Patient is currently negative for suicide ideation, negative for plan or intent, able to contract no self harm and identify barriers to suicide.  Negative for obsessions, compulsions or psychosis.      Mood Depressed   Affect Flat   Fund of Knowledge Average   Insight Impaired   Judgement Fair   Attention Span & Concentration Alert   Recent & Remote Memory Intact   Gait Normal      Labs   Labs " reviewed.  Recent Results (from the past 24 hour(s))   Hepatic panel    Collection Time: 06/09/17  7:45 AM   Result Value Ref Range    Bilirubin Direct 4.8 (H) 0.0 - 0.2 mg/dL    Bilirubin Total 5.4 (H) 0.2 - 1.3 mg/dL    Albumin 2.4 (L) 3.4 - 5.0 g/dL    Protein Total 5.6 (L) 6.8 - 8.8 g/dL    Alkaline Phosphatase 219 (H) 40 - 150 U/L    ALT 48 0 - 50 U/L     (H) 0 - 45 U/L   CBC with platelets differential    Collection Time: 06/09/17  7:45 AM   Result Value Ref Range    WBC 7.4 4.0 - 11.0 10e9/L    RBC Count 3.31 (L) 3.8 - 5.2 10e12/L    Hemoglobin 12.1 11.7 - 15.7 g/dL    Hematocrit 33.6 (L) 35.0 - 47.0 %     (H) 78 - 100 fl    MCH 36.6 (H) 26.5 - 33.0 pg    MCHC 36.0 31.5 - 36.5 g/dL    RDW 14.7 10.0 - 15.0 %    Platelet Count 118 (L) 150 - 450 10e9/L    Diff Method Automated Method     % Neutrophils 69.4 %    % Lymphocytes 13.4 %    % Monocytes 9.9 %    % Eosinophils 5.9 %    % Basophils 1.1 %    % Immature Granulocytes 0.3 %    Nucleated RBCs 1 (H) 0 /100    Absolute Neutrophil 5.2 1.6 - 8.3 10e9/L    Absolute Lymphocytes 1.0 0.8 - 5.3 10e9/L    Absolute Monocytes 0.7 0.0 - 1.3 10e9/L    Absolute Eosinophils 0.4 0.0 - 0.7 10e9/L    Absolute Basophils 0.1 0.0 - 0.2 10e9/L    Abs Immature Granulocytes 0.0 0 - 0.4 10e9/L    Absolute Nucleated RBC 0.1    Basic metabolic panel    Collection Time: 06/09/17  7:45 AM   Result Value Ref Range    Sodium 126 (L) 133 - 144 mmol/L    Potassium 4.4 3.4 - 5.3 mmol/L    Chloride 84 (L) 94 - 109 mmol/L    Carbon Dioxide 34 (H) 20 - 32 mmol/L    Anion Gap 8 3 - 14 mmol/L    Glucose 88 70 - 99 mg/dL    Urea Nitrogen 26 7 - 30 mg/dL    Creatinine 1.42 (H) 0.52 - 1.04 mg/dL    GFR Estimate 36 (L) >60 mL/min/1.7m2    GFR Estimate If Black 44 (L) >60 mL/min/1.7m2    Calcium 8.5 8.5 - 10.1 mg/dL        Impression   This is a 71 year old female with a history of depression and alcohol use. Discussed starting pt on Trintellix.  Reviewed the side effects, benefits, and  complications of medication. Pt gave verbal agreement to begin Trintellix 5mg qam with intent to titrate to 10mg. Suggested that she discontinue the Temazepam. Dr. Vargas discussed the immediate negative effects of benzodiazapine use including increased fall risk and lowered IQ. Discussed addiction risk. Also mentioned the long-term detrimental effects including increased risk of dementia. Pt verbalized understanding. Begin Seroquel 50mg qhs and 12.5-25mg q2h prn. She will continue with her current psychotropic medications. Strongly suggested that she take Antabuse on discharge in order to curb alcohol use.    Diagnoses     1. Major depression, recurrent, severe, without psychotic features.  2. Alcohol use disorder, severe.     Plan     1. Explained side effects, benefits and complications of medications to the patient.  2. Medication Changes: Begin Trintellix 5mg qam with intent to titrate to 10mg. Add Seroquel 50mg qhs with repeat x1 and 12.5-25mg q2h prn. Antabuse on discharge.  3. Discussed treatment plan with patient and team.  4. Re-consult Psychiatry.      TIME SPENT IN PSYCHIATRY INITIAL CONSULT: 55 MINUTES     Attestation:   Patient has been seen and evaluated by me, Colton Vargas MD.    Patient ID:  Name: Kavitha Headley MRN: [id number]  Admission: 6/6/2017  YOB: 1945

## 2018-05-25 DIAGNOSIS — I10 BENIGN HYPERTENSION: ICD-10-CM

## 2018-05-25 RX ORDER — FUROSEMIDE 20 MG
TABLET ORAL
Qty: 90 TABLET | Refills: 0 | Status: SHIPPED | OUTPATIENT
Start: 2018-05-25 | End: 2018-08-21

## 2018-05-25 NOTE — TELEPHONE ENCOUNTER
Prescription approved per [facility] Refill Protocol.  Merlene Ibarra RN  [facility]

## 2018-05-25 NOTE — TELEPHONE ENCOUNTER
"Requested Prescriptions   Pending Prescriptions Disp Refills     furosemide (LASIX) 20 MG tablet [Pharmacy Med Name: FUROSEMIDE 20 MG TABLET]  Last Written Prescription Date:  2/5/18  Last Fill Quantity: 90,  # refills: 1   Last office visit: 2/20/2018 with prescribing provider:  Raffaele   Future Office Visit:   Next 5 appointments (look out 90 days)     May 29, 2018  3:20 PM CDT   Return Visit with Nahum Pa MD   [facility] ([facility])    [facility]  [address]  [facility] [address]   [phone]                  90 tablet 1     Sig: TAKE 1 TABLET (20 MG) BY MOUTH DAILY    Diuretics (Including Combos) Protocol Passed    5/25/2018  1:28 AM       Passed - Blood pressure under 140/90 in past 12 months    BP Readings from Last 3 Encounters:   02/22/18 123/82   02/12/18 116/76   02/05/18 132/78                Passed - Recent (12 mo) or future (30 days) visit within the authorizing provider's specialty    Patient had office visit in the last 12 months or has a visit in the next 30 days with authorizing provider or within the authorizing provider's specialty.  See \"Patient Info\" tab in inbasket, or \"Choose Columns\" in Meds & Orders section of the refill encounter.           Passed - Patient is age 18 or older       Passed - No active pregancy on record       Passed - Normal serum creatinine on file in past 12 months    Recent Labs   Lab Test  02/05/18   1252   CR  1.00             Passed - Normal serum potassium on file in past 12 months    Recent Labs   Lab Test  02/05/18   1252   POTASSIUM  3.9                   Passed - Normal serum sodium on file in past 12 months    Recent Labs   Lab Test  02/05/18   1252   NA  136             Passed - No positive pregnancy test in past 12 months          "

## 2018-05-29 DIAGNOSIS — G89.4 CHRONIC PAIN SYNDROME: ICD-10-CM

## 2018-05-29 NOTE — TELEPHONE ENCOUNTER
Requested Prescriptions   Pending Prescriptions Disp Refills     gabapentin (NEURONTIN) 300 MG capsule 270 capsule 1     Sig: Take 1 capsule (300 mg) by mouth 3 times daily    There is no refill protocol information for this order        Last Written Prescription Date:  1/3/18  Last Fill Quantity: 270,  # refills: 1   Last office visit: 2/20/2018 with prescribing provider:  NO   Future Office Visit:   Next 5 appointments (look out 90 days)     May 29, 2018  3:20 PM CDT   Return Visit with Nahum Pa MD   [facility] ([facility])    [facility]  [address]  [facility] [address]   [phone]

## 2018-05-30 RX ORDER — GABAPENTIN 300 MG/1
300 CAPSULE ORAL 3 TIMES DAILY
Qty: 90 CAPSULE | Refills: 0 | Status: SHIPPED | OUTPATIENT
Start: 2018-05-30 | End: 2018-06-14

## 2018-06-14 ENCOUNTER — OFFICE VISIT (OUTPATIENT)
Dept: FAMILY MEDICINE | Facility: CLINIC | Age: 73
End: 2018-06-14
Payer: COMMERCIAL

## 2018-06-14 VITALS
BODY MASS INDEX: 31.41 KG/M2 | DIASTOLIC BLOOD PRESSURE: 88 MMHG | TEMPERATURE: 98.7 F | HEART RATE: 106 BPM | WEIGHT: 183 LBS | SYSTOLIC BLOOD PRESSURE: 144 MMHG

## 2018-06-14 DIAGNOSIS — G89.4 CHRONIC PAIN SYNDROME: ICD-10-CM

## 2018-06-14 DIAGNOSIS — F32.A DEPRESSION, UNSPECIFIED DEPRESSION TYPE: ICD-10-CM

## 2018-06-14 DIAGNOSIS — F10.20 UNCOMPLICATED ALCOHOL DEPENDENCE (H): ICD-10-CM

## 2018-06-14 DIAGNOSIS — G47.00 INSOMNIA, UNSPECIFIED TYPE: Primary | ICD-10-CM

## 2018-06-14 PROCEDURE — 99214 OFFICE O/P EST MOD 30 MIN: CPT | Performed by: INTERNAL MEDICINE

## 2018-06-14 RX ORDER — TEMAZEPAM 7.5 MG/1
7.5 CAPSULE ORAL
Qty: 30 CAPSULE | Refills: 0 | Status: SHIPPED | OUTPATIENT
Start: 2018-06-14 | End: 2018-08-28

## 2018-06-14 RX ORDER — GABAPENTIN 300 MG/1
300 CAPSULE ORAL 3 TIMES DAILY
Qty: 90 CAPSULE | Refills: 1 | Status: SHIPPED | OUTPATIENT
Start: 2018-06-14 | End: 2018-08-14

## 2018-06-14 NOTE — PROGRESS NOTES
SUBJECTIVE:   Kavitha Headley is a 72 year old female who presents to clinic today for the following health issues:    Kavitha is here to follow-up on insomnia.  She generally follows with Nayeli Castorena, but has not seen her recently.  She has a chronic issue with insomnia, in the past has been treated with temazepam.  Nayeli was working to get her off of the temazepam.  Most recently she was on 7.5 mg.  She has been off this for a couple months, but her sleep has been horrible.  She has a history of alcohol abuse, recently has been taking a cocktail at night to help her get to sleep.  She continues to go to AA meetings.  She is also taking trazodone and melatonin.  Before bed she watches TV and then reads for a while.  She is going to the gym at least 3 days a week.  Her mood has not been good.  She is prescribed Brintellix, but only took this for 1 month and then stopped because it was not helping.  She was referred to previous collaborative psychiatry program, by that time the appointment family arrived she forgot to go.          Reviewed and updated as needed this visit by clinical staff  Tobacco  Allergies  Meds       Reviewed and updated as needed this visit by Provider         ROS:  Psych, neuro reviewed,  otherwise negative unless noted above.       OBJECTIVE:     /88 (BP Location: Left arm, Patient Position: Chair, Cuff Size: Adult Regular)  Pulse 106  Temp 98.7  F (37.1  C) (Tympanic)  Wt 183 lb (83 kg)  BMI 31.41 kg/m2  Body mass index is 31.41 kg/(m^2).  GENERAL: healthy, alert and no distress  PSYCH: mentation appears normal, affect normal/bright    Diagnostic Test Results:  none     ASSESSMENT/PLAN:       1. Insomnia, unspecified type  Kavitha has several mental health issues including depression and alcohol dependence.  We reviewed the risks of temazepam in all patients, and those risks of side effects and dependency are even higher in patients as they age and those with alcohol dependence.   I did provide her a one-time refill of this medication with the understanding that it is a bridge to get her to see a psychiatrist to better address her depression and insomnia.  Mental health referral placed.  She is to let us know if she has any issues getting this appointment right away, rather than when it comes time due for refill.  She needs to make sure to follow with one provider given her complicated history.  Is not to take this medication with alcohol.  - MENTAL HEALTH REFERRAL  - Adult; Psychiatry and Medication Management; Psychiatry; Other: [facility] [phone]; We will contact you to schedule the appointment or please call with any questions  - temazepam (RESTORIL) 7.5 MG capsule; Take 1 capsule (7.5 mg) by mouth nightly as needed for sleep  Dispense: 30 capsule; Refill: 0    2. Uncomplicated alcohol dependence (H)    3. Depression, unspecified depression type    4. Chronic pain syndrome  Refill ordered   - gabapentin (NEURONTIN) 300 MG capsule; Take 1 capsule (300 mg) by mouth 3 times daily  Dispense: 90 capsule; Refill: 1    Follow up soon with psychiatry     Glo Clayton MD  [facility]

## 2018-06-14 NOTE — MR AVS SNAPSHOT
After Visit Summary   6/14/2018    Kavitha Headley    MRN: [id number]           Patient Information     Date Of Birth          1945        Visit Information        Provider Department      6/14/2018 12:40 PM Glo Clayton MD [facility] [place]        Today's Diagnoses     Insomnia, unspecified type    -  1    Chronic pain syndrome           Follow-ups after your visit        Additional Services     MENTAL HEALTH REFERRAL  - Adult; Psychiatry and Medication Management; Psychiatry; Other: [facility] [phone]; We will contact you to schedule the appointment or please call with any questions       All scheduling is subject to the client's specific insurance plan & benefits, provider/location availability, and provider clinical specialities.  Please arrive 15 minutes early for your first appointment and bring your completed paperwork.    Please be aware that coverage of these services is subject to the terms and limitations of your health insurance plan.  Call member services at your health plan with any benefit or coverage questions.                            Your next 10 appointments already scheduled     Jun 18, 2018  3:20 PM CDT   Office Visit with Mica Singer MD   [facility] [place] ([facility]    [address] [place] [address] [id number] [phone]           Bring a current list of meds and any records pertaining to this visit. For Physicals, please bring immunization records and any forms needing to be filled out. Please arrive 10 minutes early to complete paperwork.              Who to contact     If you have questions or need follow up information about today's clinic visit or your schedule please [facility] at [phone].  Normal or non-critical lab and imaging results will be communicated to you by MyChart, letter or phone within 4 business days  after the clinic has received the results. If you do not hear from us within 7 days, please contact the clinic through [facility] or phone. If you have a critical or abnormal lab result, we will notify you by phone as soon as possible.  Submit refill requests through [employer] or call your pharmacy and they will forward the refill request to us. Please allow 3 business days for your refill to be completed.          Additional Information About Your Visit        [facility]IC Information     [employer] gives you secure access to your electronic health record. If you see a primary care provider, you can also send messages to your care team and make appointments. If you have questions, please call your primary care clinic.  If you do not have a primary care provider, please call [phone] and they will assist you.        Care EveryWhere ID     This is your Care EveryWhere ID. This could be used by other organizations to access your [facility] medical records  [id number]        Your Vitals Were     Pulse Temperature BMI (Body Mass Index)             106 98.7  F (37.1  C) (Tympanic) 31.41 kg/m2          Blood Pressure from Last 3 Encounters:   06/14/18 144/88   02/22/18 123/82   02/12/18 116/76    Weight from Last 3 Encounters:   06/14/18 183 lb (83 kg)   02/22/18 186 lb (84.4 kg)   02/12/18 183 lb 6 oz (83.2 kg)              We Performed the Following     MENTAL HEALTH REFERRAL  - Adult; Psychiatry and Medication Management; Psychiatry; Other: [facility] [phone]; We will contact you to schedule the appointment or please call with any questions          Today's Medication Changes          These changes are accurate as of 6/14/18  1:03 PM.  If you have any questions, ask your nurse or doctor.               These medicines have changed or have updated prescriptions.        Dose/Directions    * temazepam 7.5 MG capsule   Commonly known as:  RESTORIL   This may have changed:  Another medication with the same  name was added. Make sure you understand how and when to take each.   Used for:  Thrombocytopenia (H), Liver disease, chronic, due to alcohol (H)   Changed by:  Glo Clayton MD        Take one capsule nightly.  Follow up with Nayeli Castorena PA-C  before any further refills   Quantity:  7 capsule   Refills:  0       * temazepam 7.5 MG capsule   Commonly known as:  RESTORIL   This may have changed:  You were already taking a medication with the same name, and this prescription was added. Make sure you understand how and when to take each.   Used for:  Insomnia, unspecified type   Changed by:  Glo Clayton MD        Dose:  7.5 mg   Take 1 capsule (7.5 mg) by mouth nightly as needed for sleep   Quantity:  30 capsule   Refills:  0       * Notice:  This list has 2 medication(s) that are the same as other medications prescribed for you. Read the directions carefully, and ask your doctor or other care provider to review them with you.         Where to get your medicines      These medications were sent to [id number] [place] [facility] [address] [place] [address]  [address] [address]     Phone:  [phone]     gabapentin 300 MG capsule         Some of these will need a paper prescription and others can be bought over the counter.  Ask your nurse if you have questions.     Bring a paper prescription for each of these medications     temazepam 7.5 MG capsule                Primary Care Provider Office Phone # Fax #    Nayeli Castorena PA-C [phone]       [address] [place] [address]        Equal Access to Services     [facility] AH: Hadii aad ku hadasho Soomaali, waaxda luqadaha, qaybta kaalmada adeegyada, sky king. So [facility] [phone].    ATENCIÓN: Si habla español, tiene a gaytan disposición servicios gratuitos de asistencia lingüística. Llame al [phone].    We comply with applicable [facility] civil  rights laws and [place] laws. We do not discriminate on the basis of race, color, national origin, age, disability, sex, sexual orientation, or gender identity.            Thank you!     Thank you for choosing [facility]  for your care. Our goal is always to provide you with excellent care. Hearing back from our patients is one way we can continue to improve our services. Please take a few minutes to complete the written survey that you may receive in the mail after your visit with us. Thank you!             Your Updated Medication List - Protect others around you: Learn how to safely use, store and throw away your medicines at www.disposemymeds.org.          This list is accurate as of 6/14/18  1:03 PM.  Always use your most recent med list.                   Brand Name Dispense Instructions for use Diagnosis    B-12 1000 MCG Tbcr     100 tablet    Take 1,000 mcg by mouth daily    Vitamin B12 deficiency       betamethasone (augmented) 0.05 % lotion    DIPROLENE    60 mL    Apply to AA on scalp bid for 7-10 days then when needed    Seborrheic dermatitis       CENTRUM SILVER per tablet      Take 1 tablet by mouth daily        diphenhydrAMINE 25 MG capsule    BENADRYL     Take 25 mg by mouth nightly as needed        folic acid 1 MG tablet    FOLVITE    30 tablet    Take 1 tablet (1 mg) by mouth daily    Alcohol abuse       * furosemide 20 MG tablet    LASIX    90 tablet    Take 1 tablet (20 mg) by mouth as needed (taking ~ 1x/weekly as needed for swelling)    Benign hypertension       * furosemide 20 MG tablet    LASIX    90 tablet    TAKE 1 TABLET (20 MG) BY MOUTH DAILY    Benign hypertension       gabapentin 300 MG capsule    NEURONTIN    90 capsule    Take 1 capsule (300 mg) by mouth 3 times daily    Chronic pain syndrome       ketoconazole 2 % shampoo    NIZORAL    120 mL    Apply to the affected area and wash off after 5 minutes.    Tinea capitis       magnesium oxide 400 MG tablet    MAG-OX     7 tablet    Take 1 tablet (400 mg) by mouth daily    Low magnesium levels       melatonin 3 MG tablet      Take 2 tablets (6 mg) by mouth nightly as needed for sleep        polyethylene glycol Packet    MIRALAX/GLYCOLAX    7 packet    Take 17 g by mouth daily as needed (constipation)    Constipation, unspecified constipation type       senna-docusate 8.6-50 MG per tablet    SENOKOT-S;PERICOLACE    100 tablet    Take 1-2 tablets by mouth 2 times daily as needed for constipation    Constipation, unspecified constipation type       spironolactone 100 MG tablet    ALDACTONE     100 mg daily        sucralfate 1 GM/10ML suspension    CARAFATE    1200 mL    Take 10 mLs (1 g) by mouth 4 times daily (before meals and nightly) Pt takes once daily    Esophagitis       * temazepam 7.5 MG capsule    RESTORIL    7 capsule    Take one capsule nightly.  Follow up with Nayeli Castorena PA-C  before any further refills    Thrombocytopenia (H), Liver disease, chronic, due to alcohol (H)       * temazepam 7.5 MG capsule    RESTORIL    30 capsule    Take 1 capsule (7.5 mg) by mouth nightly as needed for sleep    Insomnia, unspecified type       thiamine 100 MG tablet      Take 1 tablet (100 mg) by mouth daily    Alcohol abuse       traZODone 100 MG tablet    DESYREL    90 tablet    Take 1 tablet (100 mg) by mouth nightly as needed for sleep    Insomnia, unspecified type       triamcinolone 0.1 % cream    KENALOG    80 g    Apply to AA on the upper chest and upper back bid for 10-14 days    Dermatitis       valACYclovir 1000 mg tablet    VALTREX    4 tablet    TAKE 2 TABS EVERY 12 HRS FOR 1 DAY ONLY FOR OUTBREAKS OF COLD SORES as needed    Herpes simplex virus infection       vortioxetine 10 MG tablet    BRINTELLIX    90 tablet    Take 1 tablet (10 mg) by mouth daily    Depression, unspecified depression type       * Notice:  This list has 4 medication(s) that are the same as other medications prescribed for you. Read the directions  carefully, and ask your doctor or other care provider to review them with you.

## 2018-06-18 ENCOUNTER — OFFICE VISIT (OUTPATIENT)
Dept: FAMILY MEDICINE | Facility: CLINIC | Age: 73
End: 2018-06-18
Payer: COMMERCIAL

## 2018-06-18 VITALS — DIASTOLIC BLOOD PRESSURE: 62 MMHG | OXYGEN SATURATION: 98 % | HEART RATE: 100 BPM | SYSTOLIC BLOOD PRESSURE: 118 MMHG

## 2018-06-18 DIAGNOSIS — L40.9 SCALP PSORIASIS: Primary | ICD-10-CM

## 2018-06-18 PROCEDURE — 99214 OFFICE O/P EST MOD 30 MIN: CPT | Performed by: FAMILY MEDICINE

## 2018-06-18 RX ORDER — CLOBETASOL PROPIONATE 0.5 MG/ML
SOLUTION TOPICAL 2 TIMES DAILY
Qty: 50 ML | Refills: 3 | Status: SHIPPED | OUTPATIENT
Start: 2018-06-18 | End: 2018-08-03

## 2018-06-18 RX ORDER — CALCIPOTRIENE 0.05 MG/ML
SOLUTION TOPICAL 2 TIMES DAILY
Refills: 0 | Status: CANCELLED | OUTPATIENT
Start: 2018-06-18

## 2018-06-18 NOTE — MR AVS SNAPSHOT
After Visit Summary   6/18/2018    Kavitha Headley    MRN: [id number]           Patient Information     Date Of Birth          1945        Visit Information        Provider Department      6/18/2018 3:20 PM Mica Singer MD [facility] [place]        Today's Diagnoses     Scalp psoriasis    -  1      Care Instructions    FUTURE APPOINTMENTS  Follow up in 6-8 weeks.    TOPICAL STEROID INSTRUCTIONS  Clobetasol propionate 0.05% solution.  1. Apply a few drops and rub into the affected areas on the scalp, two times per day for 2 weeks.  2. After 2 weeks, apply once daily for 1 more week.  3. Then, use only on weekends without use during weekdays.    Never to be used on face or groin.    After the initial treatment, topical steroid may be used as needed for flare-ups but only for short-term treatment.    If you are using this for prolonged periods of time to control flare-ups, return to clinic for re-evaluation of treatment.    SHAMPOO INSTRUCTIONS  Discontinue ketoconazole 2% shampoo.    Use one of the following shampoos (active ingredients underlined):  Coal Tar shampoos : Neutrogena T/Gel, Denorex, DHS Tar, Ionil-T, Tegrin, X-Seb T, Zetar  Zinc Pyrithione shampoos : Head & Shoulders, Denorex Advance Formula, DHS Zinc, Zincon  Salicylic Acid shampoos : Neutrogena T/Sal, DHS Sal, Ionil, P&S, Sebulex, X-Seb  Selenium Sulfide shampoos : Selsun Blue shampoo  Sulfur shampoos : Sebulex  Ketoconazole : Nizoral 1%  Consider alternating use of shampoos with different active ingredients every 4 week(s).          Follow-ups after your visit        Who to contact     If you have questions or need follow up information about today's clinic visit or your schedule please contact [facility] directly at [phone].  Normal or non-critical lab and imaging results will be communicated to you by MyChart, letter or phone within 4 business days after the clinic has received the  results. If you do not hear from us within 7 days, please contact the clinic through [facility] or phone. If you have a critical or abnormal lab result, we will notify you by phone as soon as possible.  Submit refill requests through [facility] [employer] or call your pharmacy and they will forward the refill request to us. Please allow 3 business days for your refill to be completed.          Additional Information About Your Visit        [facility] Information     [facility] gives you secure access to your electronic health record. If you see a primary care provider, you can also send messages to your care team and make appointments. If you have questions, please call your primary care clinic.  If you do not have a primary care provider, please call [phone] and they will assist you.        Care EveryWhere ID     This is your Care EveryWhere ID. This could be used by other organizations to access your [place] medical records  [id number]        Your Vitals Were     Pulse Pulse Oximetry                100 98%           Blood Pressure from Last 3 Encounters:   06/18/18 118/62   06/14/18 144/88   02/22/18 123/82    Weight from Last 3 Encounters:   06/14/18 183 lb (83 kg)   02/22/18 186 lb (84.4 kg)   02/12/18 183 lb 6 oz (83.2 kg)              Today, you had the following     No orders found for display         Today's Medication Changes          These changes are accurate as of 6/18/18  3:37 PM.  If you have any questions, ask your nurse or doctor.               Start taking these medicines.        Dose/Directions    clobetasol 0.05 % external solution   Commonly known as:  TEMOVATE   Used for:  Scalp psoriasis   Started by:  Mica Singer MD        Apply topically 2 times daily   Quantity:  50 mL   Refills:  3            Where to get your medicines      These medications were sent to [facility]/pharmacy #[id number] - [place] - [address] [place] [address]  [address] [address]     Phone:   [phone]     clobetasol 0.05 % external solution                Primary Care Provider Office Phone # Fax #    Nayeli Castorena PA-C [phone]       [facility] [facility] [address]        Equal Access to Services     AILEENMACIEJ RACHELLE : Hadii aad ku hadasho Soomaali, waaxda luqadaha, qaybta kaalmada adeegyada, waxay idiin hayshanellen aderyan ramon lamalgorzatan christine. So [facility] [phone].    ATENCIÓN: Si habla español, tiene a gaytan disposición servicios gratuitos de asistencia lingüística. Llame al [phone].    We comply with applicable federal civil rights laws and [place] laws. We do not discriminate on the basis of race, color, national origin, age, disability, sex, sexual orientation, or gender identity.            Thank you!     Thank you for choosing [facility]  for your care. Our goal is always to provide you with excellent care. Hearing back from our patients is one way we can continue to improve our services. Please take a few minutes to complete the written survey that you may receive in the mail after your visit with us. Thank you!             Your Updated Medication List - Protect others around you: Learn how to safely use, store and throw away your medicines at www.disposemymeds.org.          This list is accurate as of 6/18/18  3:37 PM.  Always use your most recent med list.                   Brand Name Dispense Instructions for use Diagnosis    B-12 1000 MCG Tbcr     100 tablet    Take 1,000 mcg by mouth daily    Vitamin B12 deficiency       betamethasone (augmented) 0.05 % lotion    DIPROLENE    60 mL    Apply to AA on scalp bid for 7-10 days then when needed    Seborrheic dermatitis       CENTRUM SILVER per tablet      Take 1 tablet by mouth daily        clobetasol 0.05 % external solution    TEMOVATE    50 mL    Apply topically 2 times daily    Scalp psoriasis       diphenhydrAMINE 25 MG capsule    BENADRYL     Take 25 mg by mouth nightly as needed        folic  acid 1 MG tablet    FOLVITE    30 tablet    Take 1 tablet (1 mg) by mouth daily    Alcohol abuse       * furosemide 20 MG tablet    LASIX    90 tablet    Take 1 tablet (20 mg) by mouth as needed (taking ~ 1x/weekly as needed for swelling)    Benign hypertension       * furosemide 20 MG tablet    LASIX    90 tablet    TAKE 1 TABLET (20 MG) BY MOUTH DAILY    Benign hypertension       gabapentin 300 MG capsule    NEURONTIN    90 capsule    Take 1 capsule (300 mg) by mouth 3 times daily    Chronic pain syndrome       ketoconazole 2 % shampoo    NIZORAL    120 mL    Apply to the affected area and wash off after 5 minutes.    Tinea capitis       magnesium oxide 400 MG tablet    MAG-OX    7 tablet    Take 1 tablet (400 mg) by mouth daily    Low magnesium levels       melatonin 3 MG tablet      Take 2 tablets (6 mg) by mouth nightly as needed for sleep        polyethylene glycol Packet    MIRALAX/GLYCOLAX    7 packet    Take 17 g by mouth daily as needed (constipation)    Constipation, unspecified constipation type       senna-docusate 8.6-50 MG per tablet    SENOKOT-S;PERICOLACE    100 tablet    Take 1-2 tablets by mouth 2 times daily as needed for constipation    Constipation, unspecified constipation type       spironolactone 100 MG tablet    ALDACTONE     100 mg daily        sucralfate 1 GM/10ML suspension    CARAFATE    1200 mL    Take 10 mLs (1 g) by mouth 4 times daily (before meals and nightly) Pt takes once daily    Esophagitis       * temazepam 7.5 MG capsule    RESTORIL    7 capsule    Take one capsule nightly.  Follow up with Nayeli Castorena PA-C  before any further refills    Thrombocytopenia (H), Liver disease, chronic, due to alcohol (H)       * temazepam 7.5 MG capsule    RESTORIL    30 capsule    Take 1 capsule (7.5 mg) by mouth nightly as needed for sleep    Insomnia, unspecified type       thiamine 100 MG tablet      Take 1 tablet (100 mg) by mouth daily    Alcohol abuse       traZODone 100 MG tablet     DESYREL    90 tablet    Take 1 tablet (100 mg) by mouth nightly as needed for sleep    Insomnia, unspecified type       triamcinolone 0.1 % cream    KENALOG    80 g    Apply to AA on the upper chest and upper back bid for 10-14 days    Dermatitis       valACYclovir 1000 mg tablet    VALTREX    4 tablet    TAKE 2 TABS EVERY 12 HRS FOR 1 DAY ONLY FOR OUTBREAKS OF COLD SORES as needed    Herpes simplex virus infection       vortioxetine 10 MG tablet    BRINTELLIX    90 tablet    Take 1 tablet (10 mg) by mouth daily    Depression, unspecified depression type       * Notice:  This list has 4 medication(s) that are the same as other medications prescribed for you. Read the directions carefully, and ask your doctor or other care provider to review them with you.

## 2018-06-18 NOTE — PATIENT INSTRUCTIONS
FUTURE APPOINTMENTS  Follow up in 6-8 weeks.    TOPICAL STEROID INSTRUCTIONS  Clobetasol propionate 0.05% solution.  1. Apply a few drops and rub into the affected areas on the scalp, two times per day for 2 weeks.  2. After 2 weeks, apply once daily for 1 more week.  3. Then, use only on weekends without use during weekdays.    Never to be used on face or groin.    After the initial treatment, topical steroid may be used as needed for flare-ups but only for short-term treatment.    If you are using this for prolonged periods of time to control flare-ups, return to clinic for re-evaluation of treatment.    SHAMPOO INSTRUCTIONS  Discontinue ketoconazole 2% shampoo.    Use one of the following shampoos (active ingredients underlined):  Coal Tar shampoos : Neutrogena T/Gel, Denorex, DHS Tar, Ionil-T, Tegrin, X-Seb T, Zetar  Zinc Pyrithione shampoos : Head & Shoulders, Denorex Advance Formula, DHS Zinc, Zincon  Salicylic Acid shampoos : Neutrogena T/Sal, DHS Sal, Ionil, P&S, Sebulex, X-Seb  Selenium Sulfide shampoos : Selsun Blue shampoo  Sulfur shampoos : Sebulex  Ketoconazole : Nizoral 1%  Consider alternating use of shampoos with different active ingredients every 4 week(s).

## 2018-06-18 NOTE — PROGRESS NOTES
[facility] - PRIMARY CARE SKIN    CC : scalp pruritus  SUBJECTIVE:                                                    Kavitha Headley is a 72 year old female who presents to clinic today for follow-up scalp irritation beginning 4 months ago.     Current treatment :   Scalp - augmented betamethasone dipropionate 0.05% lotion, ketoconazole 2% shampoo  Trunk - Triamcinolone 0.1% cream  Response to treatment : symptoms had been improving after treatment, but they have regressed over the last 2 weeks.  Side effects noted : None     Previous therapies tried :   Permethrin begun 2/12/18  Prednisone 60 mg, 13 day taper begun 2/12/18 - no improvement of itchiness on chest  Ketoconazole 2% shampoo  Also cephalexin, clotrimazole, OTC antifungal previously  No improvement of itchiness with any therapies.    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    Patient Active Problem List   Diagnosis     Hyperlipidemia LDL goal <130     Spinal stenosis     Chronic insomnia     Alcohol abuse     CKD (chronic kidney disease) stage 3, GFR 30-59 ml/min     Hip joint replacement status     Knee joint replacement status     Chronic pain syndrome     Bariatric surgery status     Personal history of urinary calculi     Macrocytic anemia     Anxiety     Aortic stenosis     Left-sided low back pain without sciatica     ACP (advance care planning)     PAC (premature atrial contraction)     Gastroesophageal reflux disease, esophagitis presence not specified     Controlled substance agreement signed     Seasonal affective disorder (H)     HTN, goal below 140/90     Bilateral lower leg cellulitis     Hypokalemia     Hyponatremia     Cellulitis     Depression, unspecified depression type     Vitamin B12 deficiency      Severe alcohol dependence (H)       Past Medical History:   Diagnosis Date     Alcohol abuse     long term alcohol abuse     Anxiety disorder      Bariatric surgery status 1996?    gastric bypass, [employer] [facility] [employer] and     Benign hypertension      Chronic insomnia      Chronic pain syndrome     Chronic back and neck pain, chronic pain due to osteoarthritis multiple joints     Coronary artery disease 9/2015    mild distal branch disease on cath     Hip joint replacement status 4/2004    right     Knee joint replacement status 12/2005    left     Macrocytic anemia     Mild macrocytic anemia, 2012 to present, likely based on alcohol abuse.     Major depressive disorder, single episode, severe, without mention of psychotic behavior      Mixed hyperlipidemia      Moderate aortic stenosis 5/2014    mild/mod AS with peak gradient 33/mean gradient 20 mmHg, AV area 1.2     Pelvic relaxation disorder     Surgical intervention for cystocele/rectocele 3,11/2012     Personal history of urinary calculi 6/2006    left ureteral stone,lithotripsy     PVC (premature ventricular contraction)      Stage III chronic kidney disease 2005     SVT (supraventricular tachycardia) (H)     likely atrial tachycardia    Past Surgical History:   Procedure Laterality Date     APPENDECTOMY  3/2004    incidental     C GASTRIC BYPASS,OBESE<100CM ARIANNA-EN-Y  1996     C MEDIASTINOSCOPY W OR WO BIOPSY  2/2008    Videomediastinoscopy and, for mediastinal adenopathy -reactive lymphoid hyperplasia     C REPAIR OF RECTOCELE  3/2012     C TOTAL KNEE ARTHROPLASTY  12/2005    left      CARPAL TUNNEL RELEASE RT/LT  10/2010    Carpometacarpal excisional arthroplasty with a fascial autograft and APL suspension sling (31415). 2. Left thumb metacarpophalangeal joint fusion with autologous bone graft (22258). 3. Left endoscopic carpal tunnel release      CHOLECYSTECTOMY, LAPOROSCOPIC  11/2010    Cholecystectomy, Laparoscopic     COLONOSCOPY N/A 9/8/2016    Procedure:  COMBINED COLONOSCOPY, SINGLE OR MULTIPLE BIOPSY/POLYPECTOMY BY BIOPSY;  Surgeon: Moe Barlow MD;  Location:  GI     CYSTOCELE REPAIR  11/2012    davinci laparoscopic sacrocolpopexy, enterocele repair, lysis of adhesions, placement of retropubic mid urethral sling, cystoscopy     CYSTOSCOPY, LITHOTRIPSY, COMBINED  6/2006    Left extracorporeal shock wave lithotripsy, cystoscopy, left ureteral stent placement.     CYSTOSCOPY, REMOVE STENT(S), COMBINED  7/2006    Cystoscopy, removal of left ureteral stent, retrograde pyelography, flexible and rigid ureteroscopy and holmium laser lithotripsy, basket removal of stone fragments, ureteral stent placement.      ENDOSCOPIC ULTRASOUND UPPER GASTROINTESTINAL TRACT (GI) N/A 6/12/2017    Procedure: ENDOSCOPIC ULTRASOUND, ESOPHAGOSCOPY / UPPER GASTROINTESTINAL TRACT (GI);  ENDOSCOPIC ULTRASOUND, ESOPHAGOSCOPY / UPPER GASTROINTESTINAL TRACT (GI);  Surgeon: Parth Graham MD;  Location:  GI     HERNIA REPAIR  4/2012    bilateral augmentation mastopexy, ventral hernia repair, and medial thigh liposuction on 04/06/2012.      HYSTERECTOMY VAGINAL, BILATERAL SALPINGO-OOPHERECTOMY, COMBINED  1998    due to myoma and bleeding     JOINT REPLACEMENT, HIP RT/LT  4/2004    right total hip arthroplasty     LAPAROTOMY, LYSIS ADHESIONS, COMBINED  3/2004    lysis adhesions, ventral hernia repair, appendectomy incidentally     LYMPH NODE BIOPSY  4/2008    right axillary, reactive follicular and paracortical hyperplasia.     MAMMOPLASTY AUGMENTATION BILATERAL  4/2012     REVISE RECONSTRUCTED BREAST  6/7/2012    Left breast capsulotomy.       Social History   Substance Use Topics     Smoking status: Never Smoker     Smokeless tobacco: Never Used     Alcohol use No      Comment: none    Family History     Problem (# of Occurrences) Relation (Name,Age of Onset)    CANCER (1) Father: throat and lung mets    Substance Abuse (1) Father       Negative family history of: DIABETES, Coronary  Artery Disease, CEREBROVASCULAR DISEASE           Prescription Medications as of 6/18/2018             betamethasone, augmented, (DIPROLENE) 0.05 % lotion Apply to AA on scalp bid for 7-10 days then when needed    Cyanocobalamin (B-12) 1000 MCG TBCR Take 1,000 mcg by mouth daily    diphenhydrAMINE (BENADRYL) 25 MG capsule Take 25 mg by mouth nightly as needed    folic acid (FOLVITE) 1 MG tablet Take 1 tablet (1 mg) by mouth daily    furosemide (LASIX) 20 MG tablet TAKE 1 TABLET (20 MG) BY MOUTH DAILY    furosemide (LASIX) 20 MG tablet Take 1 tablet (20 mg) by mouth as needed (taking ~ 1x/weekly as needed for swelling)    gabapentin (NEURONTIN) 300 MG capsule Take 1 capsule (300 mg) by mouth 3 times daily    ketoconazole (NIZORAL) 2 % shampoo Apply to the affected area and wash off after 5 minutes.    magnesium oxide (MAG-OX) 400 MG tablet Take 1 tablet (400 mg) by mouth daily    melatonin 3 MG tablet Take 2 tablets (6 mg) by mouth nightly as needed for sleep    Multiple Vitamins-Minerals (CENTRUM SILVER) per tablet Take 1 tablet by mouth daily    polyethylene glycol (MIRALAX/GLYCOLAX) Packet Take 17 g by mouth daily as needed (constipation)    senna-docusate (SENOKOT-S;PERICOLACE) 8.6-50 MG per tablet Take 1-2 tablets by mouth 2 times daily as needed for constipation    spironolactone (ALDACTONE) 100 MG tablet 100 mg daily    sucralfate (CARAFATE) 1 GM/10ML suspension Take 10 mLs (1 g) by mouth 4 times daily (before meals and nightly) Pt takes once daily    temazepam (RESTORIL) 7.5 MG capsule Take 1 capsule (7.5 mg) by mouth nightly as needed for sleep    temazepam (RESTORIL) 7.5 MG capsule Take one capsule nightly.  Follow up with Nayeli Castorena PA-C  before any further refills    thiamine 100 MG tablet Take 1 tablet (100 mg) by mouth daily    traZODone (DESYREL) 100 MG tablet Take 1 tablet (100 mg) by mouth nightly as needed for sleep    triamcinolone (KENALOG) 0.1 % cream Apply to AA on the upper chest and upper  back bid for 10-14 days    valACYclovir (VALTREX) 1000 mg tablet TAKE 2 TABS EVERY 12 HRS FOR 1 DAY ONLY FOR OUTBREAKS OF COLD SORES as needed    vortioxetine (BRINTELLIX) 10 MG tablet Take 1 tablet (10 mg) by mouth daily            Allergies   Allergen Reactions     Bactrim [Sulfamethoxazole W/Trimethoprim] Hives     Codeine Itching     NAUSEA     Morphine Itching     NAUSEA     Quetiapine         INTEGUMENTARY/SKIN: POSITIVE for pruritis    ROS : 14 point review of systems was negative except the symptoms listed above in the HPI.    This document serves as a record of the services and decisions personally performed and made by Alethea Signer MD. It was created on her behalf by Braxton Mi, a trained medical scribe.  The creation of this document is based on the scribe's personal observations and the provider's statements to the medical scribe.  Braxton Mi, June 18, 2018 2:08 PM      OBJECTIVE:                                                    GENERAL: healthy, alert and in mild distress  SKIN: Fox Skin Type - II.  Scalp was examined. The dermatoscope was used to help evaluate pigmented lesions.  Skin Pertinent Findings:  Scalp : scaly erythematous plaque on occipital scalp. Light scaling on right and left lateral sides of scalp.    Elbows : Clear.    Fingernails : No pitting.    MDM : based on the clinical appearance this is most consistent with psoriasis       ASSESSMENT:                                                      Encounter Diagnosis   Name Primary?     Scalp psoriasis Yes         PLAN:                                                    Patient Instructions   FUTURE APPOINTMENTS  Follow up in 6-8 weeks.    TOPICAL STEROID INSTRUCTIONS  Clobetasol propionate 0.05% solution.    Apply a few drops and rub into the affected areas on the scalp, two times per day for 2 weeks.  1. After 2 weeks, apply once daily for 1 more week.  2. Then, use only on weekends without use during weekdays.    Never to be  used on face or groin.    After the initial treatment, topical steroid may be used as needed for flare-ups but only for short-term treatment.    If you are using this for prolonged periods of time to control flare-ups, return to clinic for re-evaluation of treatment.    SHAMPOO INSTRUCTIONS  Discontinue Ketoconazole 2% shampoo.    Use one of the following shampoos (active ingredients underlined):  Coal Tar shampoos : Neutrogena T/Gel, Denorex, DHS Tar, Ionil-T, Tegrin, X-Seb T, Zetar  Zinc Pyrithione shampoos : Head & Shoulders, Denorex Advance Formula, DHS Zinc, Zincon  Salicylic Acid shampoos : Neutrogena T/Sal, DHS Sal, Ionil, P&S, Sebulex, X-Seb  Selenium Sulfide shampoos : Selsun Blue shampoo  Sulfur shampoos : Sebulex  Ketoconazole : Nizoral 1%  Consider alternating use of shampoos with different active ingredients every 4 week(s).    The patient was counseled to use products free of fragrance, dyes, and plants. The importance of using bland cleansers and the regular use of heavy bland emollient creams was impressed upon the patient.      PROCEDURES:                                                    None.    TT : 20 minutes.  CT : 15 minutes.      The information in this document, created by the medical scribe for me, accurately reflects the services I personally performed and the decisions made by me. I have reviewed and approved this document for accuracy prior to leaving the patient care area.  Alethea Singer MD June 18, 2018 2:08 PM  [facility] [place]

## 2018-06-18 NOTE — LETTER
6/18/2018         RE: Kavitha Headley  [address] [facility] [address]        Dear Colleague,    Thank you for referring your patient, Kavitha Headley, to the [facility] [place]. Please see a copy of my visit note below.    [facility] - PRIMARY CARE SKIN    CC : scalp pruritus  SUBJECTIVE:                                                    Kavitha Headley is a 72 year old female who presents to clinic today for follow-up scalp irritation beginning 4 months ago.     Current treatment :   Scalp - augmented betamethasone dipropionate 0.05% lotion, ketoconazole 2% shampoo  Trunk - Triamcinolone 0.1% cream  Response to treatment : symptoms had been improving after treatment, but they have regressed over the last 2 weeks.  Side effects noted : None     Previous therapies tried :   Permethrin begun 2/12/18  Prednisone 60 mg, 13 day taper begun 2/12/18 - no improvement of itchiness on chest  Ketoconazole 2% shampoo  Also cephalexin, clotrimazole, OTC antifungal previously  No improvement of itchiness with any therapies.    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    Patient Active Problem List   Diagnosis     Hyperlipidemia LDL goal <130     Spinal stenosis     Chronic insomnia     Alcohol abuse     CKD (chronic kidney disease) stage 3, GFR 30-59 ml/min     Hip joint replacement status     Knee joint replacement status     Chronic pain syndrome     Bariatric surgery status     Personal history of urinary calculi     Macrocytic anemia     Anxiety     Aortic stenosis     Left-sided low back pain without sciatica     ACP (advance care planning)     PAC (premature atrial contraction)     Gastroesophageal reflux disease, esophagitis presence not specified      Controlled substance agreement signed     Seasonal affective disorder (H)     HTN, goal below 140/90     Bilateral lower leg cellulitis     Hypokalemia     Hyponatremia     Cellulitis     Depression, unspecified depression type     Vitamin B12 deficiency     Severe alcohol dependence (H)       Past Medical History:   Diagnosis Date     Alcohol abuse     long term alcohol abuse     Anxiety disorder      Bariatric surgery status 1996?    gastric bypass, [employer] [facility] [employer] and     Benign hypertension      Chronic insomnia      Chronic pain syndrome     Chronic back and neck pain, chronic pain due to osteoarthritis multiple joints     Coronary artery disease 9/2015    mild distal branch disease on cath     Hip joint replacement status 4/2004    right     Knee joint replacement status 12/2005    left     Macrocytic anemia     Mild macrocytic anemia, 2012 to present, likely based on alcohol abuse.     Major depressive disorder, single episode, severe, without mention of psychotic behavior      Mixed hyperlipidemia      Moderate aortic stenosis 5/2014    mild/mod AS with peak gradient 33/mean gradient 20 mmHg, AV area 1.2     Pelvic relaxation disorder     Surgical intervention for cystocele/rectocele 3,11/2012     Personal history of urinary calculi 6/2006    left ureteral stone,lithotripsy     PVC (premature ventricular contraction)      Stage III chronic kidney disease 2005     SVT (supraventricular tachycardia) (H)     likely atrial tachycardia    Past Surgical History:   Procedure Laterality Date     APPENDECTOMY  3/2004    incidental     C GASTRIC BYPASS,OBESE<100CM ARIANNA-EN-Y  1996     C MEDIASTINOSCOPY W OR WO BIOPSY  2/2008    Videomediastinoscopy and, for mediastinal adenopathy -reactive lymphoid hyperplasia     C REPAIR OF RECTOCELE  3/2012     C TOTAL KNEE ARTHROPLASTY  12/2005    left      CARPAL TUNNEL RELEASE RT/LT  10/2010    Carpometacarpal excisional arthroplasty with a fascial autograft and APL suspension sling  (88392). 2. Left thumb metacarpophalangeal joint fusion with autologous bone graft (99212). 3. Left endoscopic carpal tunnel release      CHOLECYSTECTOMY, LAPOROSCOPIC  11/2010    Cholecystectomy, Laparoscopic     COLONOSCOPY N/A 9/8/2016    Procedure: COMBINED COLONOSCOPY, SINGLE OR MULTIPLE BIOPSY/POLYPECTOMY BY BIOPSY;  Surgeon: Moe Barlow MD;  Location:  GI     CYSTOCELE REPAIR  11/2012    davinci laparoscopic sacrocolpopexy, enterocele repair, lysis of adhesions, placement of retropubic mid urethral sling, cystoscopy     CYSTOSCOPY, LITHOTRIPSY, COMBINED  6/2006    Left extracorporeal shock wave lithotripsy, cystoscopy, left ureteral stent placement.     CYSTOSCOPY, REMOVE STENT(S), COMBINED  7/2006    Cystoscopy, removal of left ureteral stent, retrograde pyelography, flexible and rigid ureteroscopy and holmium laser lithotripsy, basket removal of stone fragments, ureteral stent placement.      ENDOSCOPIC ULTRASOUND UPPER GASTROINTESTINAL TRACT (GI) N/A 6/12/2017    Procedure: ENDOSCOPIC ULTRASOUND, ESOPHAGOSCOPY / UPPER GASTROINTESTINAL TRACT (GI);  ENDOSCOPIC ULTRASOUND, ESOPHAGOSCOPY / UPPER GASTROINTESTINAL TRACT (GI);  Surgeon: Parth Graham MD;  Location:  GI     HERNIA REPAIR  4/2012    bilateral augmentation mastopexy, ventral hernia repair, and medial thigh liposuction on 04/06/2012.      HYSTERECTOMY VAGINAL, BILATERAL SALPINGO-OOPHERECTOMY, COMBINED  1998    due to myoma and bleeding     JOINT REPLACEMENT, HIP RT/LT  4/2004    right total hip arthroplasty     LAPAROTOMY, LYSIS ADHESIONS, COMBINED  3/2004    lysis adhesions, ventral hernia repair, appendectomy incidentally     LYMPH NODE BIOPSY  4/2008    right axillary, reactive follicular and paracortical hyperplasia.     MAMMOPLASTY AUGMENTATION BILATERAL  4/2012     REVISE RECONSTRUCTED BREAST  6/7/2012    Left breast capsulotomy.       Social History   Substance Use Topics     Smoking status: Never Smoker     Smokeless tobacco:  Never Used     Alcohol use No      Comment: none    Family History     Problem (# of Occurrences) Relation (Name,Age of Onset)    CANCER (1) Father: throat and lung mets    Substance Abuse (1) Father       Negative family history of: DIABETES, Coronary Artery Disease, CEREBROVASCULAR DISEASE           Prescription Medications as of 6/18/2018             betamethasone, augmented, (DIPROLENE) 0.05 % lotion Apply to AA on scalp bid for 7-10 days then when needed    Cyanocobalamin (B-12) 1000 MCG TBCR Take 1,000 mcg by mouth daily    diphenhydrAMINE (BENADRYL) 25 MG capsule Take 25 mg by mouth nightly as needed    folic acid (FOLVITE) 1 MG tablet Take 1 tablet (1 mg) by mouth daily    furosemide (LASIX) 20 MG tablet TAKE 1 TABLET (20 MG) BY MOUTH DAILY    furosemide (LASIX) 20 MG tablet Take 1 tablet (20 mg) by mouth as needed (taking ~ 1x/weekly as needed for swelling)    gabapentin (NEURONTIN) 300 MG capsule Take 1 capsule (300 mg) by mouth 3 times daily    ketoconazole (NIZORAL) 2 % shampoo Apply to the affected area and wash off after 5 minutes.    magnesium oxide (MAG-OX) 400 MG tablet Take 1 tablet (400 mg) by mouth daily    melatonin 3 MG tablet Take 2 tablets (6 mg) by mouth nightly as needed for sleep    Multiple Vitamins-Minerals (CENTRUM SILVER) per tablet Take 1 tablet by mouth daily    polyethylene glycol (MIRALAX/GLYCOLAX) Packet Take 17 g by mouth daily as needed (constipation)    senna-docusate (SENOKOT-S;PERICOLACE) 8.6-50 MG per tablet Take 1-2 tablets by mouth 2 times daily as needed for constipation    spironolactone (ALDACTONE) 100 MG tablet 100 mg daily    sucralfate (CARAFATE) 1 GM/10ML suspension Take 10 mLs (1 g) by mouth 4 times daily (before meals and nightly) Pt takes once daily    temazepam (RESTORIL) 7.5 MG capsule Take 1 capsule (7.5 mg) by mouth nightly as needed for sleep    temazepam (RESTORIL) 7.5 MG capsule Take one capsule nightly.  Follow up with Nayeli Castorena PA-C  before any  further refills    thiamine 100 MG tablet Take 1 tablet (100 mg) by mouth daily    traZODone (DESYREL) 100 MG tablet Take 1 tablet (100 mg) by mouth nightly as needed for sleep    triamcinolone (KENALOG) 0.1 % cream Apply to AA on the upper chest and upper back bid for 10-14 days    valACYclovir (VALTREX) 1000 mg tablet TAKE 2 TABS EVERY 12 HRS FOR 1 DAY ONLY FOR OUTBREAKS OF COLD SORES as needed    vortioxetine (BRINTELLIX) 10 MG tablet Take 1 tablet (10 mg) by mouth daily            Allergies   Allergen Reactions     Bactrim [Sulfamethoxazole W/Trimethoprim] Hives     Codeine Itching     NAUSEA     Morphine Itching     NAUSEA     Quetiapine         INTEGUMENTARY/SKIN: POSITIVE for pruritis    ROS : 14 point review of systems was negative except the symptoms listed above in the HPI.    This document serves as a record of the services and decisions personally performed and made by Alethea Singer MD. It was created on her behalf by Braxton Mi, a trained medical scribe.  The creation of this document is based on the scribe's personal observations and the provider's statements to the medical scribe.  Braxton Mi, June 18, 2018 2:08 PM      OBJECTIVE:                                                    GENERAL: healthy, alert and in mild distress  SKIN: Fox Skin Type - II.  Scalp was examined. The dermatoscope was used to help evaluate pigmented lesions.  Skin Pertinent Findings:  Scalp : scaly erythematous plaque on occipital scalp. Light scaling on right and left lateral sides of scalp.    Elbows : Clear.    Fingernails : No pitting.    MDM : based on the clinical appearance this is most consistent with psoriasis       ASSESSMENT:                                                      Encounter Diagnosis   Name Primary?     Scalp psoriasis Yes         PLAN:                                                    Patient Instructions   FUTURE APPOINTMENTS  Follow up in 6-8 weeks.    TOPICAL STEROID  INSTRUCTIONS  Clobetasol propionate 0.05% solution.    Apply a few drops and rub into the affected areas on the scalp, two times per day for 2 weeks.  1. After 2 weeks, apply once daily for 1 more week.  2. Then, use only on weekends without use during weekdays.    Never to be used on face or groin.    After the initial treatment, topical steroid may be used as needed for flare-ups but only for short-term treatment.    If you are using this for prolonged periods of time to control flare-ups, return to clinic for re-evaluation of treatment.    SHAMPOO INSTRUCTIONS  Discontinue Ketoconazole 2% shampoo.    Use one of the following shampoos (active ingredients underlined):  Coal Tar shampoos : Neutrogena T/Gel, Denorex, DHS Tar, Ionil-T, Tegrin, X-Seb T, Zetar  Zinc Pyrithione shampoos : Head & Shoulders, Denorex Advance Formula, DHS Zinc, Zincon  Salicylic Acid shampoos : Neutrogena T/Sal, DHS Sal, Ionil, P&S, Sebulex, X-Seb  Selenium Sulfide shampoos : Selsun Blue shampoo  Sulfur shampoos : Sebulex  Ketoconazole : Nizoral 1%  Consider alternating use of shampoos with different active ingredients every 4 week(s).    The patient was counseled to use products free of fragrance, dyes, and plants. The importance of using bland cleansers and the regular use of heavy bland emollient creams was impressed upon the patient.      PROCEDURES:                                                    None.    TT : 20 minutes.  CT : 15 minutes.      The information in this document, created by the medical scribe for me, accurately reflects the services I personally performed and the decisions made by me. I have reviewed and approved this document for accuracy prior to leaving the patient care area.  Alethea Singer MD June 18, 2018 2:08 PM  [facility] [place]    Again, thank you for allowing me to participate in the care of your patient.        Sincerely,        Mica Singer MD

## 2018-07-12 DIAGNOSIS — G89.4 CHRONIC PAIN SYNDROME: ICD-10-CM

## 2018-07-12 DIAGNOSIS — G47.00 INSOMNIA, UNSPECIFIED TYPE: ICD-10-CM

## 2018-07-12 NOTE — TELEPHONE ENCOUNTER
Temazepam 7.5mg      Last Written Prescription Date:  6/14/18  Last Fill Quantity: 30,   # refills: 0  Last Office Visit: 6/14/18  Future Office visit:       Routing refill request to provider for review/approval because:  Drug not on the [facility], [facility] or [facility] refill protocol or controlled substance    Michelle Vargas CMA

## 2018-07-13 RX ORDER — TEMAZEPAM 7.5 MG/1
CAPSULE ORAL
Qty: 30 CAPSULE | Refills: 0 | OUTPATIENT
Start: 2018-07-13

## 2018-07-13 NOTE — TELEPHONE ENCOUNTER
Controlled Substance Refill Request for Temazepam   Problem List Complete:  Yes    Last Written Prescription Date:  6/14/2018  Last Fill Quantity: 30,   # refills: 0    Last Office Visit with G primary care provider: 6/14/2018    Clinic visit frequency required: none noted     Future Office visit:     Controlled substance agreement on file: Yes:  Date 12/12/2016.     Processing:  Fax Rx to [facility] pharmacy pharmacy   checked in past 3 months?  No, route to RN     Routing refill request to provider for review/approval because:  Drug not on the [facility] refill protocol   Merlene Ibarra RN  [facility]

## 2018-07-13 NOTE — TELEPHONE ENCOUNTER
Please call patient.      We will not refill this medication as previously discussed numerous times with this patient.  This medication was filled one time on 6/14/2018 by Dr. Clayton with specific instructions that any further refills would be determined by psychiatry due to the dangerous nature of this RX especially in light of her  depression, age, and alcohol use.    Currently, in the [place] system she does not have any appointment scheduled with psychiatry.  Please assist her with making this appointment.      Nayeli Castorena MS, PA-C

## 2018-07-23 NOTE — TELEPHONE ENCOUNTER
Patient notified by phone of Nayeli Castorena's note below.  She was agreeable to have me call [facility] to schedule.  Mental Health Referral patient was referred to [facility] for psychiatry and medication management.      I called [facility] at [phone] to schedule.      [facility] said they have opening at [facility] on 8/22/2018 at 2 pm with Radha Nicholson in [place].  First appointment is 2 hours to fill out paperwork and meet with provider.  Patient is asked to contact her insurance to verify coverage of mental health.  They will send her appointment information a week before her appointment.    Patient notified of the above and agreed with plan.    Ember Hammer, BS, RN, PHN  [facility]) [phone]

## 2018-07-31 DIAGNOSIS — G47.00 INSOMNIA, UNSPECIFIED TYPE: ICD-10-CM

## 2018-07-31 RX ORDER — TRAZODONE HYDROCHLORIDE 100 MG/1
TABLET ORAL
Qty: 90 TABLET | Refills: 1 | Status: SHIPPED | OUTPATIENT
Start: 2018-07-31 | End: 2018-08-28

## 2018-07-31 NOTE — TELEPHONE ENCOUNTER
"Requested Prescriptions   Pending Prescriptions Disp Refills     traZODone (DESYREL) 100 MG tablet [Pharmacy Med Name: TRAZODONE 100 MG TABLET]  Last Written Prescription Date:  2/5/18  Last Fill Quantity: 90,  # refills: 1   Last office visit: 6/18/2018 with prescribing provider:  Matilde   Future Office Visit:     90 tablet 1     Sig: TAKE 1 TABLET (100 MG) BY MOUTH NIGHTLY AS NEEDED FOR SLEEP    Serotonin Modulators Passed    7/31/2018  1:31 AM       Passed - Recent (12 mo) or future (30 days) visit within the authorizing provider's specialty    Patient had office visit in the last 12 months or has a visit in the next 30 days with authorizing provider or within the authorizing provider's specialty.  See \"Patient Info\" tab in inbasket, or \"Choose Columns\" in Meds & Orders section of the refill encounter.           Passed - Patient is age 18 or older       Passed - No active pregnancy on record       Passed - No positive pregnancy test in past 12 months          "

## 2018-07-31 NOTE — TELEPHONE ENCOUNTER
Prescription approved per [facility], [facility] or [facility] refill protocol.  Samantha Saavedra RN - Triage  [facility]

## 2018-08-03 ENCOUNTER — HOSPITAL ENCOUNTER (INPATIENT)
Facility: CLINIC | Age: 73
LOS: 6 days | Discharge: HOME | DRG: 885 | End: 2018-08-09
Attending: EMERGENCY MEDICINE | Admitting: PSYCHIATRY & NEUROLOGY
Payer: MEDICARE

## 2018-08-03 ENCOUNTER — TELEPHONE (OUTPATIENT)
Dept: BEHAVIORAL HEALTH | Facility: CLINIC | Age: 73
End: 2018-08-03
Payer: COMMERCIAL

## 2018-08-03 DIAGNOSIS — F32.A ANXIETY AND DEPRESSION: Primary | ICD-10-CM

## 2018-08-03 DIAGNOSIS — F32.A DEPRESSION: ICD-10-CM

## 2018-08-03 DIAGNOSIS — F41.9 ANXIETY AND DEPRESSION: Primary | ICD-10-CM

## 2018-08-03 DIAGNOSIS — F10.10 ALCOHOL ABUSE: ICD-10-CM

## 2018-08-03 LAB
ALBUMIN SERPL-MCNC: 3 G/DL (ref 3.4–5)
ALP SERPL-CCNC: 139 U/L (ref 40–150)
ALT SERPL W P-5'-P-CCNC: 93 U/L (ref 0–50)
ANION GAP SERPL CALCULATED.3IONS-SCNC: 11 MMOL/L (ref 3–14)
AST SERPL W P-5'-P-CCNC: 153 U/L (ref 0–45)
BASOPHILS # BLD AUTO: 0 10E9/L (ref 0–0.2)
BASOPHILS NFR BLD AUTO: 0.3 %
BILIRUB SERPL-MCNC: 1.1 MG/DL (ref 0.2–1.3)
BUN SERPL-MCNC: 6 MG/DL (ref 7–30)
CALCIUM SERPL-MCNC: 9 MG/DL (ref 8.5–10.1)
CHLORIDE SERPL-SCNC: 97 MMOL/L (ref 94–109)
CO2 SERPL-SCNC: 30 MMOL/L (ref 20–32)
CREAT SERPL-MCNC: 0.93 MG/DL (ref 0.52–1.04)
DIFFERENTIAL METHOD BLD: ABNORMAL
EOSINOPHIL # BLD AUTO: 0 10E9/L (ref 0–0.7)
EOSINOPHIL NFR BLD AUTO: 0.6 %
ERYTHROCYTE [DISTWIDTH] IN BLOOD BY AUTOMATED COUNT: 14.7 % (ref 10–15)
GFR SERPL CREATININE-BSD FRML MDRD: 59 ML/MIN/1.7M2
GLUCOSE SERPL-MCNC: 85 MG/DL (ref 70–99)
HCT VFR BLD AUTO: 37.3 % (ref 35–47)
HGB BLD-MCNC: 12.8 G/DL (ref 11.7–15.7)
IMM GRANULOCYTES # BLD: 0 10E9/L (ref 0–0.4)
IMM GRANULOCYTES NFR BLD: 0.3 %
LYMPHOCYTES # BLD AUTO: 1 10E9/L (ref 0.8–5.3)
LYMPHOCYTES NFR BLD AUTO: 15.3 %
MCH RBC QN AUTO: 35.9 PG (ref 26.5–33)
MCHC RBC AUTO-ENTMCNC: 34.3 G/DL (ref 31.5–36.5)
MCV RBC AUTO: 105 FL (ref 78–100)
MONOCYTES # BLD AUTO: 0.9 10E9/L (ref 0–1.3)
MONOCYTES NFR BLD AUTO: 13.5 %
NEUTROPHILS # BLD AUTO: 4.4 10E9/L (ref 1.6–8.3)
NEUTROPHILS NFR BLD AUTO: 70 %
NRBC # BLD AUTO: 0 10*3/UL
NRBC BLD AUTO-RTO: 0 /100
PLATELET # BLD AUTO: 173 10E9/L (ref 150–450)
POTASSIUM SERPL-SCNC: 3.3 MMOL/L (ref 3.4–5.3)
PROT SERPL-MCNC: 6.9 G/DL (ref 6.8–8.8)
RBC # BLD AUTO: 3.57 10E12/L (ref 3.8–5.2)
SODIUM SERPL-SCNC: 138 MMOL/L (ref 133–144)
WBC # BLD AUTO: 6.4 10E9/L (ref 4–11)

## 2018-08-03 PROCEDURE — 12400006 ZZH R&B MH INTERMEDIATE

## 2018-08-03 PROCEDURE — 96374 THER/PROPH/DIAG INJ IV PUSH: CPT

## 2018-08-03 PROCEDURE — 80053 COMPREHEN METABOLIC PANEL: CPT | Performed by: EMERGENCY MEDICINE

## 2018-08-03 PROCEDURE — A9270 NON-COVERED ITEM OR SERVICE: HCPCS | Mod: GY | Performed by: PSYCHIATRY & NEUROLOGY

## 2018-08-03 PROCEDURE — 25000128 H RX IP 250 OP 636: Performed by: EMERGENCY MEDICINE

## 2018-08-03 PROCEDURE — 25000132 ZZH RX MED GY IP 250 OP 250 PS 637: Mod: GY | Performed by: PSYCHIATRY & NEUROLOGY

## 2018-08-03 PROCEDURE — 90791 PSYCH DIAGNOSTIC EVALUATION: CPT

## 2018-08-03 PROCEDURE — A9270 NON-COVERED ITEM OR SERVICE: HCPCS | Mod: GY | Performed by: EMERGENCY MEDICINE

## 2018-08-03 PROCEDURE — 85025 COMPLETE CBC W/AUTO DIFF WBC: CPT | Performed by: EMERGENCY MEDICINE

## 2018-08-03 PROCEDURE — 25000132 ZZH RX MED GY IP 250 OP 250 PS 637: Mod: GY | Performed by: EMERGENCY MEDICINE

## 2018-08-03 PROCEDURE — 96361 HYDRATE IV INFUSION ADD-ON: CPT

## 2018-08-03 PROCEDURE — 99285 EMERGENCY DEPT VISIT HI MDM: CPT | Mod: 25

## 2018-08-03 RX ORDER — SODIUM CHLORIDE 9 MG/ML
1000 INJECTION, SOLUTION INTRAVENOUS CONTINUOUS
Status: DISCONTINUED | OUTPATIENT
Start: 2018-08-03 | End: 2018-08-06

## 2018-08-03 RX ORDER — LORAZEPAM 2 MG/ML
1-2 INJECTION INTRAMUSCULAR EVERY 30 MIN PRN
Status: DISCONTINUED | OUTPATIENT
Start: 2018-08-03 | End: 2018-08-08

## 2018-08-03 RX ORDER — SPIRONOLACTONE 100 MG/1
100 TABLET, FILM COATED ORAL DAILY
Status: DISCONTINUED | OUTPATIENT
Start: 2018-08-03 | End: 2018-08-03

## 2018-08-03 RX ORDER — LANOLIN ALCOHOL/MO/W.PET/CERES
100 CREAM (GRAM) TOPICAL DAILY
Status: DISCONTINUED | OUTPATIENT
Start: 2018-08-03 | End: 2018-08-09 | Stop reason: HOSPADM

## 2018-08-03 RX ORDER — AMOXICILLIN 250 MG
1-2 CAPSULE ORAL 2 TIMES DAILY PRN
Status: DISCONTINUED | OUTPATIENT
Start: 2018-08-03 | End: 2018-08-09 | Stop reason: HOSPADM

## 2018-08-03 RX ORDER — HYDROXYZINE HYDROCHLORIDE 25 MG/1
25 TABLET, FILM COATED ORAL EVERY 4 HOURS PRN
Status: DISCONTINUED | OUTPATIENT
Start: 2018-08-03 | End: 2018-08-09 | Stop reason: HOSPADM

## 2018-08-03 RX ORDER — FUROSEMIDE 20 MG
20 TABLET ORAL DAILY
Status: DISCONTINUED | OUTPATIENT
Start: 2018-08-03 | End: 2018-08-03

## 2018-08-03 RX ORDER — ONDANSETRON 2 MG/ML
4 INJECTION INTRAMUSCULAR; INTRAVENOUS EVERY 30 MIN PRN
Status: DISCONTINUED | OUTPATIENT
Start: 2018-08-03 | End: 2018-08-03

## 2018-08-03 RX ORDER — MAGNESIUM OXIDE 400 MG/1
400 TABLET ORAL DAILY
Status: DISCONTINUED | OUTPATIENT
Start: 2018-08-03 | End: 2018-08-09 | Stop reason: HOSPADM

## 2018-08-03 RX ORDER — FOLIC ACID 1 MG/1
1 TABLET ORAL DAILY
Status: DISCONTINUED | OUTPATIENT
Start: 2018-08-03 | End: 2018-08-09 | Stop reason: HOSPADM

## 2018-08-03 RX ORDER — TRAZODONE HYDROCHLORIDE 100 MG/1
100 TABLET ORAL
Status: DISCONTINUED | OUTPATIENT
Start: 2018-08-03 | End: 2018-08-04

## 2018-08-03 RX ORDER — GABAPENTIN 300 MG/1
300 CAPSULE ORAL EVERY MORNING
Status: DISCONTINUED | OUTPATIENT
Start: 2018-08-04 | End: 2018-08-09 | Stop reason: HOSPADM

## 2018-08-03 RX ORDER — POTASSIUM CHLORIDE 1500 MG/1
40 TABLET, EXTENDED RELEASE ORAL ONCE
Status: COMPLETED | OUTPATIENT
Start: 2018-08-03 | End: 2018-08-03

## 2018-08-03 RX ORDER — DIPHENHYDRAMINE HCL 25 MG
25 CAPSULE ORAL
Status: DISCONTINUED | OUTPATIENT
Start: 2018-08-03 | End: 2018-08-09 | Stop reason: HOSPADM

## 2018-08-03 RX ORDER — POLYETHYLENE GLYCOL 3350 17 G/17G
17 POWDER, FOR SOLUTION ORAL DAILY PRN
Status: DISCONTINUED | OUTPATIENT
Start: 2018-08-03 | End: 2018-08-09 | Stop reason: HOSPADM

## 2018-08-03 RX ORDER — GABAPENTIN 600 MG/1
600 TABLET ORAL EVERY EVENING
Status: DISCONTINUED | OUTPATIENT
Start: 2018-08-03 | End: 2018-08-09 | Stop reason: HOSPADM

## 2018-08-03 RX ORDER — METOCLOPRAMIDE HYDROCHLORIDE 5 MG/ML
10 INJECTION INTRAMUSCULAR; INTRAVENOUS ONCE
Status: COMPLETED | OUTPATIENT
Start: 2018-08-03 | End: 2018-08-03

## 2018-08-03 RX ORDER — LANOLIN ALCOHOL/MO/W.PET/CERES
9 CREAM (GRAM) TOPICAL
Status: DISCONTINUED | OUTPATIENT
Start: 2018-08-03 | End: 2018-08-09 | Stop reason: HOSPADM

## 2018-08-03 RX ORDER — LORAZEPAM 1 MG/1
1-2 TABLET ORAL EVERY 30 MIN PRN
Status: DISCONTINUED | OUTPATIENT
Start: 2018-08-03 | End: 2018-08-08

## 2018-08-03 RX ORDER — SUCRALFATE ORAL 1 G/10ML
1 SUSPENSION ORAL
Status: DISCONTINUED | OUTPATIENT
Start: 2018-08-03 | End: 2018-08-03

## 2018-08-03 RX ORDER — LANOLIN ALCOHOL/MO/W.PET/CERES
1000 CREAM (GRAM) TOPICAL DAILY
Status: DISCONTINUED | OUTPATIENT
Start: 2018-08-03 | End: 2018-08-09 | Stop reason: HOSPADM

## 2018-08-03 RX ORDER — MULTIPLE VITAMINS W/ MINERALS TAB 9MG-400MCG
1 TAB ORAL DAILY
Status: DISCONTINUED | OUTPATIENT
Start: 2018-08-03 | End: 2018-08-09 | Stop reason: HOSPADM

## 2018-08-03 RX ADMIN — LORAZEPAM 2 MG: 1 TABLET ORAL at 17:58

## 2018-08-03 RX ADMIN — CYANOCOBALAMIN TAB 1000 MCG 1000 MCG: 1000 TAB at 22:11

## 2018-08-03 RX ADMIN — GABAPENTIN 600 MG: 600 TABLET, FILM COATED ORAL at 22:11

## 2018-08-03 RX ADMIN — POTASSIUM CHLORIDE 40 MEQ: 1500 TABLET, EXTENDED RELEASE ORAL at 14:22

## 2018-08-03 RX ADMIN — FOLIC ACID 1 MG: 1 TABLET ORAL at 18:39

## 2018-08-03 RX ADMIN — SODIUM CHLORIDE 1000 ML: 9 INJECTION, SOLUTION INTRAVENOUS at 13:10

## 2018-08-03 RX ADMIN — LORAZEPAM 2 MG: 1 TABLET ORAL at 22:38

## 2018-08-03 RX ADMIN — MELATONIN TAB 3 MG 9 MG: 3 TAB at 22:39

## 2018-08-03 RX ADMIN — METOCLOPRAMIDE 10 MG: 5 INJECTION, SOLUTION INTRAMUSCULAR; INTRAVENOUS at 13:11

## 2018-08-03 RX ADMIN — Medication 100 MG: at 18:39

## 2018-08-03 RX ADMIN — MULTIPLE VITAMINS W/ MINERALS TAB 1 TABLET: TAB at 18:39

## 2018-08-03 RX ADMIN — Medication 400 MG: at 18:39

## 2018-08-03 RX ADMIN — TRAZODONE HYDROCHLORIDE 100 MG: 100 TABLET ORAL at 22:38

## 2018-08-03 ASSESSMENT — ENCOUNTER SYMPTOMS
FEVER: 0
DYSURIA: 0
NAUSEA: 1
SLEEP DISTURBANCE: 1
BLOOD IN STOOL: 0
DYSPHORIC MOOD: 1
NERVOUS/ANXIOUS: 1
DIARRHEA: 1
ABDOMINAL PAIN: 0
COUGH: 0
VOMITING: 1

## 2018-08-03 ASSESSMENT — ACTIVITIES OF DAILY LIVING (ADL)
ORAL_HYGIENE: INDEPENDENT
GROOMING: INDEPENDENT
LAUNDRY: WITH SUPERVISION
DRESS: SCRUBS (BEHAVIORAL HEALTH)

## 2018-08-03 NOTE — TELEPHONE ENCOUNTER
S: JANE Sosa , calling with clinical on this 72 year old woman.     B: Pt presents with daughter due to failure to thrive and excessive drinking. Pt lost her job about 1.5 years ago. Recently had a financial crisis. Pt has been drinking to self medicate with these stressors. Pt drinks most evenings to intoxication. Pt has had some nausea and vomiting after trying to stop drinking 4 days ago. No hx of withdrawal seizures. Hasn't vomited since a couple of days ago. Pt has been isolating more, days when she doesn't get out of the house and even bed. Denies SI. Has thoughts of going to bed and not waking up. Has been working with PCP recently. PCP stopped giving her Restoril and referred her to see psychiatrist. Pt has psychiatry appointment at the end of the month. Takes trazodone and gabapentin. Cooperative but very anxious.     A: Voluntary    R: Dr. Vargas accepts for [facility]. Unit notified of pending admission at 1520. ED RN given disposition.

## 2018-08-03 NOTE — ED PROVIDER NOTES
"  History     Chief Complaint:  Nausea, Vomiting and Diarrhea    HPI   Kavitha Headley is a 72 year old female with a history of alcohol abuse, hyperlipidemia, CKD stage III and anxiety/depression who presents to the ED complaining of nausea, vomiting and diarrhea, which started four days ago. The patient mentions she last vomited two days ago. She feels light headed and weak and mentions night sweats. She has had a bit of abdominal cramping with the diarrhea. She denies blood in the stool, hematemesis, dysuria, cough, fevers. She mentions the past few months have been very stressful for her and she has been \"self medicating\" with alcohol. She just drank some demetrice this morning to help her sleep because she hasn't been sleeping well and has felt so anxious. She would like to stop drinking, but is having a hard time with it. She has not had an alcoholic seizure before. The patient has a past abdominal surgical history of cholecystectomy, appendectomy, gastric bypass and total hysterectomy.    Allergies:  Bactrim  Morphine  Codeine  Quetiapine    Medications:    Diprolene  Temovate  Benadryl  Cyanocobalamin  Folvite  Lasix  Neurontin  Mag Ox  Nizoral  Aldactone  Restoril  Senokot  Miralax  Carafate  Melatonin  Brintillix  Valtrex  Thiamine    Past Medical History:    Alcohol abuse  Anxiety  Hypertension  Chronic pain  CAD  Macrocytic anemia  Depression  Pelvic relaxation disorder  SVT  Stage III chronic kidney disease  PVC  Urinary calculi  Hyperlipidemia  Aortic stenosis    Past Surgical History:    Appendectomy  Gastric bypass  Mediastinoscopy  Knee arthroscopy  Hip arthroscopy  Cholecystectomy  Colonoscopy  Hysterectomy total  Hernia repair  Mammoplasty    Family History:    Substance abuse- father  Throat cancer and lung mets- father    Social History:  The patient was accompanied to the ED by her daughter.  Smoking Status: Never  Smokeless Tobacco: Never  Alcohol Use: No  Marital Status:        Review of " "Systems   Constitutional: Negative for fever.   Respiratory: Negative for cough.    Gastrointestinal: Positive for diarrhea, nausea and vomiting. Negative for abdominal pain and blood in stool.   Genitourinary: Negative for dysuria.   Psychiatric/Behavioral: Positive for dysphoric mood and sleep disturbance. The patient is nervous/anxious.    All other systems reviewed and are negative.    Physical Exam   Vitals:  Patient Vitals for the past 24 hrs:   BP Temp Temp src Heart Rate SpO2 Height Weight   08/03/18 1305 - - - 85 - - -   08/03/18 1219 (!) 139/104 98.4  F (36.9  C) Oral 108 99 % 1.626 m (5' 4\") 79.8 kg (176 lb)   08/03/18 1215 (!) 139/104 - - - 97 % - -     Physical Exam  Eyes:  The pupils are equal and round    Conjunctivae and sclerae are normal  ENT:    The nose is normal    Pinnae are normal  CV:  Systolic murmur.    Regular rate and rhythm     No edema  Resp:  Lungs are clear    Non-labored    No rales    No wheezing   GI:  Abdomen is soft, there is no rigidity    No abdominal tenderness.  MS:  Normal muscular tone    No asymmetric leg swelling  Skin:  No rash or acute skin lesions noted  Neuro:   Awake, alert.      Speech is normal and fluent.    Face is symmetric.     Moves all extremities    Emergency Department Course     Laboratory:  CBC: WBC 6.4, HGB 12.8,   CMP: Potassium 3.3(L), BUN 6(L), Albumin 3.0(L), ALT 93(H), (H), GFR 59(L)    Interventions:  1239 Zofran injection 4mg    1240 NS Bolus IV    1300 Reglan injection 10mg    1344 Potassium Chloride tablet 40mEq    Emergency Department Course:  Nursing notes and vitals reviewed.  I performed an exam of the patient as documented above.     1347 I rechecked the patient and discussed her lab results with her.    1412 I spoke to DEC regarding the patient's worsening depression and issue with alcohol. DEC consulted with the patient afterwards.    Findings and plan explained to the patient who consents to admission. Discussed the patient " with Dr. Vargas, who will admit the patient to a mental health bed for further monitoring, evaluation, and treatment.    Impression & Plan      Medical Decision Making:  Kavitha is a 71 yo female who presents to the ED with a recent episode of nausea, vomiting and diarrhea. She has a history of ETOH abuse, as well as depression. She has no abdominal pain or tenderness on exam. She is slightly tachycardic. IV is established and she was given nausea medication and IV fluids. Her labs were reviewed. Her WBC is normal. Her electrolyte also shows no evidence of dehydration. She did express significant concerns about depression. She denies any suicidal thoughts. I offered mental health eval by DEC, which she agreed to do. After consultation with DEC, it was agreed upon to have her admitted voluntarily to inpatient mental health. Dr. Vargas was willing to accept the patient for admission.    Diagnosis:    ICD-10-CM    1. Depression F32.9      Disposition:  The patient was admitted.    8/3/2018    EMERGENCY DEPARTMENT    REINA, Sara Dunne, am serving as a scribe at 1223 on August 3, 2018  to document services personally performed by Dr. Galarza based on my observations and the provider's statements to me.           Niraj Galarza MD  08/03/18 5341

## 2018-08-03 NOTE — IP AVS SNAPSHOT
MRN:[id number]                      After Visit Summary   8/3/2018    Kavitha Headley    MRN: [id number]           Thank you!     Thank you for choosing [facility] for your care. Our goal is always to provide you with excellent care.        Patient Information     Date Of Birth          1945        Designated Caregiver       Most Recent Value    Caregiver    Will someone help with your care after discharge? no      About your hospital stay     You were admitted on:  August 3, 2018 You last received care in the:  [facility]    You were discharged on:  August 9, 2018       Who to Call     For medical emergencies, please call 911.  For non-urgent questions about your medical care, please call your primary care provider or clinic, [phone]          Attending Provider     Provider Specialty    Niraj Galarza MD Emergency Medicine    LeodanBannerColton prabhakar MD Psychiatry       Primary Care Provider Office Phone # Fax #    Nayeli Candy Castorena PA-C [phone]      Further instructions from your care team       Behavioral Discharge Planning and Instructions    Summary:  Admitted for worsening depression and alcohol abuse.     Main Diagnosis:  Major Depression, recurrent, severe, without psychotic features; Alcohol Use Disorder    Major Treatments, Procedures and Findings:  Psychiatric assessment. Medication adjustment.     Symptoms to Report: Increased confusion, Losing more sleep or sleeping too much, Mood getting worse or Thoughts of suicide    Lifestyle Adjustment:  Follow all treatment recommendations. Develop and follow safety plan.  Abstain from the use of all mood-altering substances, including alcohol, as usage may increase symptoms of depression. Maintain sobriety by attending daily AA or NA meetings and obtaining a sponsor.     Psychiatry Follow-up:     Please call as soon as possible following discharge to set up follow up psychiatry and therapy  appointments at [facility]. You will need to be seen within 30 days in order to get your medications refilled.       [address] [place] [address]  [phone] / Fax: [phone]     If you decide that you would like to do a chemical dependency assessment in the future, you are encouraged to call [facility] to set up an appointment. They would be able to help you determine what resources are available to you to assist you in maintaining sobriety.  They are licensed to do chemical dependency assessments for persons who have Medicare.     [facility] / [facility]  [address] [address]  [phone]    Resources:   Crisis Intervention: 282.156.7570 or 548-849-8791 (TTY: 826.708.4164).  Call anytime for help.  National [place] on Mental Illness (www.[place].joya.org): 477.901.1170 or 743-319-6308.  Alcoholics Anonymous (www.alcoholics-anonymous.org): Check your phone book for your local chapter.  National Suicide Prevention Line (www.mentalhealthmn.org): 170-182-UVES (4729)  [facility] Crisis Line - 24/7 mobile and telephone crisis response services in [facility]. One central access number: 964.272.6613 for all services.    General Medication Instructions:   See your medication sheet(s) for instructions.   Take all medicines as directed.  Make no changes unless your doctor suggests them.   Go to all your doctor visits.  Be sure to have all your required lab tests. This way, your medicines can be refilled on time.  Do not use any drugs not prescribed by your doctor.  Avoid alcohol.      Pending Results     No orders found from 8/1/2018 to 8/4/2018.            Statement of Approval     Ordered          08/09/18 1341  I have reviewed and agree with all the recommendations and orders detailed in this document.  EFFECTIVE NOW     Approved and electronically signed by:  Alicia  "Colton NAVAS MD             Admission Information     Date & Time Provider Department Dept. Phone    8/3/2018 Colotn Vargas MD [facility] [phone]      Your Vitals Were     Blood Pressure Pulse Temperature Respirations Height Weight    126/90 90 97.6  F (36.4  C) (Oral) 16 1.626 m (5' 4\") 80 kg (176 lb 4.8 oz)    Pulse Oximetry BMI (Body Mass Index)                95% 30.26 kg/m2          MyChart Information     [employer] gives you secure access to your electronic health record. If you see a primary care provider, you can also send messages to your care team and make appointments. If you have questions, please call your primary care clinic.  If you do not have a primary care provider, please call [phone] and they will assist you.        Care EveryWhere ID     This is your Care EveryWhere ID. This could be used by other organizations to access your [facility] medical records  [id number]        Equal Access to Services     PATRICIA BUSH AH: Hadii юлия ordonezo Soaustyn, waaxda luqadaha, qaybta kaalmada adeegyada, sky vides . So [facility] [phone].    ATENCIÓN: Si habla español, tiene a gaytan disposición servicios gratuitos de asistencia lingüística. Llame al [phone].    We comply with applicable federal civil rights laws and [place] laws. We do not discriminate on the basis of race, color, national origin, age, disability, sex, sexual orientation, or gender identity.               Review of your medicines      START taking        Dose / Directions    disulfiram 250 MG tablet   Commonly known as:  ANTABUSE   Used for:  Alcohol abuse        Dose:  250 mg   Start taking on:  8/10/2018   Take 1 tablet (250 mg) by mouth daily   Quantity:  30 tablet   Refills:  0       hydrOXYzine 25 MG tablet   Commonly known as:  ATARAX        Dose:  25 mg   Take 1 tablet (25 mg) by mouth every 4 hours as needed for anxiety   Quantity:  120 tablet   Refills:  0       "   CONTINUE these medicines which may have CHANGED, or have new prescriptions. If we are uncertain of the size of tablets/capsules you have at home, strength may be listed as something that might have changed.        Dose / Directions    * GABAPENTIN PO   This may have changed:  Another medication with the same name was changed. Make sure you understand how and when to take each.        Dose:  600 mg   Take 600 mg by mouth every evening   Refills:  0       * gabapentin 300 MG capsule   Commonly known as:  NEURONTIN   This may have changed:  when to take this   Used for:  Chronic pain syndrome        Dose:  300 mg   Take 1 capsule (300 mg) by mouth 3 times daily   Quantity:  90 capsule   Refills:  1       * Notice:  This list has 2 medication(s) that are the same as other medications prescribed for you. Read the directions carefully, and ask your doctor or other care provider to review them with you.      CONTINUE these medicines which have NOT CHANGED        Dose / Directions    B-12 1000 MCG Tbcr   Used for:  Vitamin B12 deficiency        Dose:  1000 mcg   Take 1,000 mcg by mouth daily   Quantity:  100 tablet   Refills:  1       CENTRUM SILVER per tablet        Dose:  1 tablet   Take 1 tablet by mouth daily   Refills:  0       diphenhydrAMINE 25 MG capsule   Commonly known as:  BENADRYL        Dose:  25 mg   Take 25 mg by mouth nightly as needed   Refills:  0       folic acid 1 MG tablet   Commonly known as:  FOLVITE   Used for:  Alcohol abuse        Dose:  1 mg   Take 1 tablet (1 mg) by mouth daily   Quantity:  30 tablet   Refills:  0       furosemide 20 MG tablet   Commonly known as:  LASIX   Used for:  Benign hypertension        TAKE 1 TABLET (20 MG) BY MOUTH DAILY   Quantity:  90 tablet   Refills:  0       ketoconazole 2 % shampoo   Commonly known as:  NIZORAL   Used for:  Tinea capitis        Apply to the affected area and wash off after 5 minutes.   Quantity:  120 mL   Refills:  1       magnesium oxide 400 MG  tablet   Commonly known as:  MAG-OX   Used for:  Low magnesium levels        Dose:  400 mg   Take 1 tablet (400 mg) by mouth daily   Quantity:  7 tablet   Refills:  0       melatonin 3 MG tablet        Dose:  9 mg   Take 9 mg by mouth nightly as needed for sleep   Refills:  0       polyethylene glycol Packet   Commonly known as:  MIRALAX/GLYCOLAX   Used for:  Constipation, unspecified constipation type        Dose:  17 g   Take 17 g by mouth daily as needed (constipation)   Quantity:  7 packet   Refills:  0       senna-docusate 8.6-50 MG per tablet   Commonly known as:  SENOKOT-S;PERICOLACE   Used for:  Constipation, unspecified constipation type        Dose:  1-2 tablet   Take 1-2 tablets by mouth 2 times daily as needed for constipation   Quantity:  100 tablet   Refills:  0       spironolactone 100 MG tablet   Commonly known as:  ALDACTONE        Dose:  100 mg   100 mg daily   Refills:  3       sucralfate 1 GM/10ML suspension   Commonly known as:  CARAFATE   Used for:  Esophagitis        Dose:  1 g   Take 10 mLs (1 g) by mouth 4 times daily (before meals and nightly) Pt takes once daily   Quantity:  1200 mL   Refills:  0       temazepam 7.5 MG capsule   Commonly known as:  RESTORIL   Used for:  Insomnia, unspecified type        Dose:  7.5 mg   Take 1 capsule (7.5 mg) by mouth nightly as needed for sleep   Quantity:  30 capsule   Refills:  0       thiamine 100 MG tablet   Used for:  Alcohol abuse        Dose:  100 mg   Take 1 tablet (100 mg) by mouth daily   Refills:  0       traZODone 100 MG tablet   Commonly known as:  DESYREL   Used for:  Insomnia, unspecified type        TAKE 1 TABLET (100 MG) BY MOUTH NIGHTLY AS NEEDED FOR SLEEP   Quantity:  90 tablet   Refills:  1       valACYclovir 1000 mg tablet   Commonly known as:  VALTREX   Used for:  Herpes simplex virus infection        TAKE 2 TABS EVERY 12 HRS FOR 1 DAY ONLY FOR OUTBREAKS OF COLD SORES as needed   Quantity:  4 tablet   Refills:  3       vortioxetine  10 MG tablet   Commonly known as:  BRINTELLIX   Used for:  Depression, unspecified depression type        Dose:  10 mg   Take 1 tablet (10 mg) by mouth daily   Quantity:  90 tablet   Refills:  0            Where to get your medicines      These medications were sent to [facility]/pharmacy #[id number] - [place], [place] - [address] [facility]. AT [address] [place] [address]  [address] [address]     Phone:  [phone]     disulfiram 250 MG tablet    hydrOXYzine 25 MG tablet                Protect others around you: Learn how to safely use, store and throw away your medicines at www.disposemymeds.org.             Medication List: This is a list of all your medications and when to take them. Check marks below indicate your daily home schedule. Keep this list as a reference.      Medications           Morning Afternoon Evening Bedtime As Needed    B-12 1000 MCG Tbcr   Take 1,000 mcg by mouth daily                                CENTRUM SILVER per tablet   Take 1 tablet by mouth daily   Last time this was given:  1 tablet on 8/9/2018  8:33 AM                                   diphenhydrAMINE 25 MG capsule   Commonly known as:  BENADRYL   Take 25 mg by mouth nightly as needed   Last time this was given:  25 mg on 8/9/2018 12:14 AM                                   disulfiram 250 MG tablet   Commonly known as:  ANTABUSE   Take 1 tablet (250 mg) by mouth daily   Start taking on:  8/10/2018   Last time this was given:  250 mg on 8/9/2018  8:33 AM                                   folic acid 1 MG tablet   Commonly known as:  FOLVITE   Take 1 tablet (1 mg) by mouth daily   Last time this was given:  1 mg on 8/9/2018  8:33 AM                                   furosemide 20 MG tablet   Commonly known as:  LASIX   TAKE 1 TABLET (20 MG) BY MOUTH DAILY                                * GABAPENTIN PO   Take 600 mg by mouth every evening   Last time this was given:  300 mg on 8/9/2018  8:33 AM                                * gabapentin 300  MG capsule   Commonly known as:  NEURONTIN   Take 1 capsule (300 mg) by mouth 3 times daily   Last time this was given:  300 mg on 8/9/2018  8:33 AM                                hydrOXYzine 25 MG tablet   Commonly known as:  ATARAX   Take 1 tablet (25 mg) by mouth every 4 hours as needed for anxiety   Last time this was given:  25 mg on 8/9/2018 12:48 PM                                   ketoconazole 2 % shampoo   Commonly known as:  NIZORAL   Apply to the affected area and wash off after 5 minutes.                                magnesium oxide 400 MG tablet   Commonly known as:  MAG-OX   Take 1 tablet (400 mg) by mouth daily   Last time this was given:  400 mg on 8/9/2018  8:33 AM                                   melatonin 3 MG tablet   Take 9 mg by mouth nightly as needed for sleep   Last time this was given:  9 mg on 8/9/2018 12:14 AM                                   polyethylene glycol Packet   Commonly known as:  MIRALAX/GLYCOLAX   Take 17 g by mouth daily as needed (constipation)   Last time this was given:  17 g on 8/8/2018  9:48 AM                                   senna-docusate 8.6-50 MG per tablet   Commonly known as:  SENOKOT-S;PERICOLACE   Take 1-2 tablets by mouth 2 times daily as needed for constipation   Last time this was given:  2 tablets on 8/8/2018  9:48 AM                                   spironolactone 100 MG tablet   Commonly known as:  ALDACTONE   100 mg daily   Last time this was given:  100 mg on 8/9/2018  8:42 AM                                   sucralfate 1 GM/10ML suspension   Commonly known as:  CARAFATE   Take 10 mLs (1 g) by mouth 4 times daily (before meals and nightly) Pt takes once daily   Last time this was given:  1 g on 8/9/2018 12:48 PM                                            temazepam 7.5 MG capsule   Commonly known as:  RESTORIL   Take 1 capsule (7.5 mg) by mouth nightly as needed for sleep                                   thiamine 100 MG tablet   Take 1 tablet  (100 mg) by mouth daily   Last time this was given:  100 mg on 8/9/2018  8:33 AM                                   traZODone 100 MG tablet   Commonly known as:  DESYREL   TAKE 1 TABLET (100 MG) BY MOUTH NIGHTLY AS NEEDED FOR SLEEP   Last time this was given:  100 mg on 8/3/2018 10:38 PM                                   valACYclovir 1000 mg tablet   Commonly known as:  VALTREX   TAKE 2 TABS EVERY 12 HRS FOR 1 DAY ONLY FOR OUTBREAKS OF COLD SORES as needed                                vortioxetine 10 MG tablet   Commonly known as:  BRINTELLIX   Take 1 tablet (10 mg) by mouth daily                                   * Notice:  This list has 2 medication(s) that are the same as other medications prescribed for you. Read the directions carefully, and ask your doctor or other care provider to review them with you.              More Information        Recovering from Addiction    Recovery means making a new life for yourself. This includes finding new interests. It involves building new relationships. It means taking better care of yourself. These will all help you replace substance use with a new and healthier life. They will also help you avoid the things that could make you want to use again.  Make lifestyle changes  A big part of recovery is changing habits. These are habits that may have led to your substance abuse. It s also a time for personal growth. Below are some changes you may want to make.    Find new activities and goals. You may want to try new hobbies and interests. Or, you may want to join an activity group to meet new people.    Build relationships. You may choose to spend more time with loved ones you lost touch with while you were using. You may also want to make new friends. And there may be some friends or family members you will not want to see because they are still using.    Exercise and eat well. Get some physical activity on most days. This can help you feel better. Eat healthy meals with lots  of fruits, vegetables, and whole grains. This can also help your well-being. A nutritionist or fitness expert can help you.    Maintain ties with medical and addiction professionals. While you may not need intense professional support, it is important to have reliable safety nets so you can access professional assistance when needed.    Relax and get enough sleep. Good sleep can help you feel better. So can less stress. Ask your counselor about relaxation exercises. These may include meditation. Also ask about stress management classes.  Date Last Reviewed: 2/1/2017 2000-2017 [employer]. [address] [place] [address]. All rights reserved. This information is not intended as a substitute for professional medical care. Always follow your healthcare professional's instructions.                Understanding the Disease of Addiction  What is addiction?  Addiction is a long-lasting (chronic) disease of the brain. It affects how your brain learns and works.Your genes and your environment can affect your risk for addiction. A family history of addiction also raises your risk. But anyone can have an addiction.Unfortunately, many people falsely think that addiction is a moral weakness. They think that people addicted to drugs or alcohol are just behaving badly or making poor choices.  How does addiction affect my brain?  Whether you start using drugs or alcohol is your choice. But once your brain is exposed to the addictive substance, your brain begins to change. This is especially true if you are more at risk for addiction. These brain changes overpower your self-control. This happens because the substance overexcites the brain s reward center. The substance mimics the brain's own natural feel-good chemicals. The brain is rewired into believing that the substance is a good thing and that you need it to survive. This rewiring is very strong. Over time, you no longer find pleasure in other things  you once enjoyed. The addiction is more powerful.  If you keep using the substance, your brain makes less of its own feel-good chemicals. You then must keep using drugs or alcohol to try to make up for the low levels of the brain chemicals. Over time your brain needs more and more of the drug or alcohol to achieve this. You need the drug. You no longer think about the physical, emotional, and social harm it causes.  Can you become addicted to things other than drugs or alcohol?  Addiction can happen in response to other pleasurable things that stimulate the brain s reward center. These things include eating, having sex, gambling, using tobacco, and using the internet.  Can you get control over a brain disease?  The only way to get over an addiction is to stop using the substance. Not using it lets your brain recover and go back to its normal functioning. You can relearn how to find pleasure in other things again. But your brain will always be at risk for addiction. Addiction is very powerful. So you usually will need medical help and social support for long-term success.  Addiction is a chronic condition. It s common for people who are recovering from addiction to start using the substance again (called a relapse). This doesn t mean that treatment doesn t work. Just like other chronic health conditions, addiction requires ongoing treatment that changes as the person s needs change.    Date Last Reviewed: 5/1/2017 2000-2017 [employer]. [address] [place] [address]. All rights reserved. This information is not intended as a substitute for professional medical care. Always follow your healthcare professional's instructions.                Depression: Tips to Help Yourself    As your healthcare providers help treat your depression, you can also help yourself. Keep in mind that your illness affects you emotionally, physically, mentally, and socially. So full recovery will take time. Take  care of your body and your soul, and be patient with yourself as you get better.  Self-care    Educate yourself. Read about treatment and medicine options. If you have the energy, attend local conferences or support groups. Keep a list of useful websites and helpful books and use them as needed. This illness is not your fault. Don t blame yourself for your depression.    Manage early symptoms. If you notice symptoms returning, experience triggers, or identify other factors that may lead to a depressive episode, get help as soon as possible. Ask trusted friends and family to monitor your behavior and let you know if they see anything of concern.    Work with your provider. Find a provider you can trust. Communicate honestly with that person and share information on your treatment for depression and your reaction to medicines.    Be prepared for a crisis. Know what to do if you experience a crisis. Keep the phone number of a crisis hotline and know the location of your community's urgent care centers and the closest emergency department.    Hold off on big decisions. Depression can cloud your judgment. So wait until you feel better before making major life decisions, such as changing jobs, moving, or getting  or .    Be patient. Recovering from depression is a process. Don t be discouraged if it takes some time to feel better.    Keep it simple. Depression saps your energy and concentration. So you won t be able to do all the things you used to do. Set small goals and do what you can.    Be with others. Don t isolate yourself--you ll only feel worse. Try to be with other people. And take part in fun activities when you can. Go to a movie, ballgame, [employer] service, or social event. Talk openly with people you can trust. And accept help when it s offered.  Take care of your body  People with depression often lose the desire to take care of themselves. That only makes their problems worse. During  treatment and afterward, make a point to:    Exercise. It s a great way to take care of your body. And studies have shown that exercise helps fight depression.    Avoid drugs and alcohol. These may ease the pain in the short term. But they ll only make your problems worse in the long run.    Get relief from stress. Ask your healthcare provider for relaxation exercises and techniques to help relieve stress.    Eat right. A balanced and healthy diet helps keep your body healthy.  Date Last Reviewed: 1/1/2017 2000-2017 [employer]. [address] [place] [address], [place] [address]. All rights reserved. This information is not intended as a substitute for professional medical care. Always follow your healthcare professional's instructions.

## 2018-08-03 NOTE — PHARMACY-ADMISSION MEDICATION HISTORY
Admission medication history interview status for the 8/3/2018  admission is complete. See EPIC admission navigator for prior to admission medications     Medication history source reliability:Good    Actions taken by pharmacist (provider contacted, etc):interviewed patient regarding her medication. She was an excellent historian and knowledgeable about her medications     Additional medication history information not noted on PTA med list : As indicated with past doses, patient has poor compliance with prescribed regimen    Medication reconciliation/reorder completed by provider prior to medication history? No    Time spent in this activity: 20    Prior to Admission medications    Medication Sig Last Dose Taking? Auth Provider   Cyanocobalamin (B-12) 1000 MCG TBCR Take 1,000 mcg by mouth daily Past Month at Unknown time Yes Nayeli Castorena PA-C   diphenhydrAMINE (BENADRYL) 25 MG capsule Take 25 mg by mouth nightly as needed 8/2/2018 at Unknown time Yes Reported, Patient   folic acid (FOLVITE) 1 MG tablet Take 1 tablet (1 mg) by mouth daily Past Month at Unknown time Yes Nan Macias DO   gabapentin (NEURONTIN) 300 MG capsule Take 1 capsule (300 mg) by mouth 3 times daily  Patient taking differently: Take 300 mg by mouth every morning  8/2/2018 at Unknown time Yes Glo Clayton MD   GABAPENTIN PO Take 600 mg by mouth every evening 8/2/2018 at Unknown time Yes Unknown, Entered By History   ketoconazole (NIZORAL) 2 % shampoo Apply to the affected area and wash off after 5 minutes. 8/2/2018 at Unknown time Yes Nayeli Castorena PA-C   magnesium oxide (MAG-OX) 400 MG tablet Take 1 tablet (400 mg) by mouth daily 8/2/2018 at Unknown time Yes Nan Macias DO   melatonin 3 MG tablet Take 9 mg by mouth nightly as needed for sleep  8/2/2018 at Unknown time Yes Abstract, Provider   Multiple Vitamins-Minerals (CENTRUM SILVER) per tablet Take 1 tablet by mouth daily Past Month at Unknown  time Yes Reported, Patient   polyethylene glycol (MIRALAX/GLYCOLAX) Packet Take 17 g by mouth daily as needed (constipation) Past Month at Unknown time Yes Nan Macias DO   temazepam (RESTORIL) 7.5 MG capsule Take 1 capsule (7.5 mg) by mouth nightly as needed for sleep Past Month at Unknown time Yes Glo Clayton MD   thiamine 100 MG tablet Take 1 tablet (100 mg) by mouth daily Past Month at Unknown time Yes Nan Macias DO   traZODone (DESYREL) 100 MG tablet TAKE 1 TABLET (100 MG) BY MOUTH NIGHTLY AS NEEDED FOR SLEEP 8/2/2018 at Unknown time Yes Glo Clayton MD   vortioxetine (BRINTELLIX) 10 MG tablet Take 1 tablet (10 mg) by mouth daily Past Month at Unknown time Yes Nayeli Castorena PA-C   furosemide (LASIX) 20 MG tablet TAKE 1 TABLET (20 MG) BY MOUTH DAILY More than a month at Unknown time  Nayeli Castorena PA-C   senna-docusate (SENOKOT-S;PERICOLACE) 8.6-50 MG per tablet Take 1-2 tablets by mouth 2 times daily as needed for constipation More than a month at Unknown time  Nan Macias DO   spironolactone (ALDACTONE) 100 MG tablet 100 mg daily More than a month at Unknown time  Reported, Patient   sucralfate (CARAFATE) 1 GM/10ML suspension Take 10 mLs (1 g) by mouth 4 times daily (before meals and nightly) Pt takes once daily More than a month at Unknown time  Abstract, Provider   valACYclovir (VALTREX) 1000 mg tablet TAKE 2 TABS EVERY 12 HRS FOR 1 DAY ONLY FOR OUTBREAKS OF COLD SORES as needed More than a month at Unknown time  Alison Pena MD

## 2018-08-03 NOTE — PLAN OF CARE
Problem: General Plan of Care (Inpatient Behavioral)  Goal: Individualization/Patient Specific Goal (IP Behavioral)  The patient and/or their representative will achieve their patient-specific goals related to the plan of care.    The patient-specific goals include:  1. Keep self safe well in the hospital  2. Talk openly with provider team  3. Take offered meds  4. Build new coping skill  5. Use skills after discharge  6. Follow up with aftercare plan        Welcome packet reviewed with patient. Information reviewed includes getting emergency help, preventing infections, understanding your care, using medication safely, reducing falls, preventing pressure ulcers, smoking cessation, powerful choices and Patients Bill of Rights. Pt. given tour of the unit and instruction on use of facility including emergency call light. Program schedule reviewed with patient. Questions regarding the unit addressed. Pt. Search completed and belongings inventoried.      Nursing assessment complete including patient and medication profiles. Risk assessments completed addressing suicide,fall,skin,nutrition and safety issues. Care plan initiated. Assessments reviewed with physician and admit orders received.

## 2018-08-03 NOTE — IP AVS SNAPSHOT
[place] [address] [address]    Phone:  [phone]                                       After Visit Summary   8/3/2018    Kavitha Headley    MRN: [id number]           After Visit Summary Signature Page     I have received my discharge instructions, and my questions have been answered. I have discussed any challenges I see with this plan with the nurse or doctor.    ..........................................................................................................................................  Patient/Patient Representative Signature      ..........................................................................................................................................  Patient Representative Print Name and Relationship to Patient    ..................................................               ................................................  Date                                            Time    ..........................................................................................................................................  Reviewed by Signature/Title    ...................................................              ..............................................  Date                                                            Time

## 2018-08-04 LAB
ANION GAP SERPL CALCULATED.3IONS-SCNC: 8 MMOL/L (ref 3–14)
BUN SERPL-MCNC: 7 MG/DL (ref 7–30)
CALCIUM SERPL-MCNC: 8.3 MG/DL (ref 8.5–10.1)
CHLORIDE SERPL-SCNC: 103 MMOL/L (ref 94–109)
CO2 SERPL-SCNC: 30 MMOL/L (ref 20–32)
CREAT SERPL-MCNC: 0.72 MG/DL (ref 0.52–1.04)
GFR SERPL CREATININE-BSD FRML MDRD: 79 ML/MIN/1.7M2
GLUCOSE SERPL-MCNC: 81 MG/DL (ref 70–99)
MAGNESIUM SERPL-MCNC: 1.6 MG/DL (ref 1.6–2.3)
POTASSIUM SERPL-SCNC: 3.3 MMOL/L (ref 3.4–5.3)
POTASSIUM SERPL-SCNC: 4.2 MMOL/L (ref 3.4–5.3)
SODIUM SERPL-SCNC: 141 MMOL/L (ref 133–144)
TSH SERPL DL<=0.005 MIU/L-ACNC: 3.05 MU/L (ref 0.4–4)

## 2018-08-04 PROCEDURE — 90853 GROUP PSYCHOTHERAPY: CPT

## 2018-08-04 PROCEDURE — HZ2ZZZZ DETOXIFICATION SERVICES FOR SUBSTANCE ABUSE TREATMENT: ICD-10-PCS | Performed by: INTERNAL MEDICINE

## 2018-08-04 PROCEDURE — 25000132 ZZH RX MED GY IP 250 OP 250 PS 637: Mod: GY | Performed by: NURSE PRACTITIONER

## 2018-08-04 PROCEDURE — 25000132 ZZH RX MED GY IP 250 OP 250 PS 637: Mod: GY | Performed by: PSYCHIATRY & NEUROLOGY

## 2018-08-04 PROCEDURE — 36415 COLL VENOUS BLD VENIPUNCTURE: CPT | Performed by: NURSE PRACTITIONER

## 2018-08-04 PROCEDURE — 12400006 ZZH R&B MH INTERMEDIATE

## 2018-08-04 PROCEDURE — 84132 ASSAY OF SERUM POTASSIUM: CPT | Performed by: NURSE PRACTITIONER

## 2018-08-04 PROCEDURE — 84443 ASSAY THYROID STIM HORMONE: CPT | Performed by: PSYCHIATRY & NEUROLOGY

## 2018-08-04 PROCEDURE — 83735 ASSAY OF MAGNESIUM: CPT | Performed by: PSYCHIATRY & NEUROLOGY

## 2018-08-04 PROCEDURE — A9270 NON-COVERED ITEM OR SERVICE: HCPCS | Mod: GY | Performed by: PSYCHIATRY & NEUROLOGY

## 2018-08-04 PROCEDURE — A9270 NON-COVERED ITEM OR SERVICE: HCPCS | Mod: GY | Performed by: NURSE PRACTITIONER

## 2018-08-04 PROCEDURE — 80048 BASIC METABOLIC PNL TOTAL CA: CPT | Performed by: PSYCHIATRY & NEUROLOGY

## 2018-08-04 PROCEDURE — 36415 COLL VENOUS BLD VENIPUNCTURE: CPT | Performed by: PSYCHIATRY & NEUROLOGY

## 2018-08-04 PROCEDURE — 99207 ZZC APP CREDIT; MD BILLING SHARED VISIT: CPT | Performed by: INTERNAL MEDICINE

## 2018-08-04 PROCEDURE — 99207 ZZC CDG-MDM COMPONENT: MEETS LOW - DOWN CODED: CPT | Performed by: NURSE PRACTITIONER

## 2018-08-04 PROCEDURE — 99222 1ST HOSP IP/OBS MODERATE 55: CPT | Performed by: NURSE PRACTITIONER

## 2018-08-04 RX ORDER — POTASSIUM CHLORIDE 7.45 MG/ML
10 INJECTION INTRAVENOUS
Status: DISCONTINUED | OUTPATIENT
Start: 2018-08-04 | End: 2018-08-08

## 2018-08-04 RX ORDER — POTASSIUM CHLORIDE 1.5 G/1.58G
20-40 POWDER, FOR SOLUTION ORAL
Status: DISCONTINUED | OUTPATIENT
Start: 2018-08-04 | End: 2018-08-08

## 2018-08-04 RX ORDER — SPIRONOLACTONE 100 MG/1
100 TABLET, FILM COATED ORAL DAILY
Status: DISCONTINUED | OUTPATIENT
Start: 2018-08-04 | End: 2018-08-09 | Stop reason: HOSPADM

## 2018-08-04 RX ORDER — POTASSIUM CHLORIDE 29.8 MG/ML
20 INJECTION INTRAVENOUS
Status: DISCONTINUED | OUTPATIENT
Start: 2018-08-04 | End: 2018-08-08

## 2018-08-04 RX ORDER — POTASSIUM CL/LIDO/0.9 % NACL 10MEQ/0.1L
10 INTRAVENOUS SOLUTION, PIGGYBACK (ML) INTRAVENOUS
Status: DISCONTINUED | OUTPATIENT
Start: 2018-08-04 | End: 2018-08-08

## 2018-08-04 RX ORDER — MAGNESIUM SULFATE HEPTAHYDRATE 40 MG/ML
2 INJECTION, SOLUTION INTRAVENOUS DAILY PRN
Status: DISCONTINUED | OUTPATIENT
Start: 2018-08-04 | End: 2018-08-08

## 2018-08-04 RX ORDER — FUROSEMIDE 20 MG
20 TABLET ORAL DAILY PRN
Status: DISCONTINUED | OUTPATIENT
Start: 2018-08-04 | End: 2018-08-09 | Stop reason: HOSPADM

## 2018-08-04 RX ORDER — MAGNESIUM SULFATE HEPTAHYDRATE 40 MG/ML
4 INJECTION, SOLUTION INTRAVENOUS EVERY 4 HOURS PRN
Status: DISCONTINUED | OUTPATIENT
Start: 2018-08-04 | End: 2018-08-08

## 2018-08-04 RX ORDER — POTASSIUM CHLORIDE 1500 MG/1
20-40 TABLET, EXTENDED RELEASE ORAL
Status: DISCONTINUED | OUTPATIENT
Start: 2018-08-04 | End: 2018-08-08

## 2018-08-04 RX ORDER — SUCRALFATE ORAL 1 G/10ML
1 SUSPENSION ORAL
Status: DISCONTINUED | OUTPATIENT
Start: 2018-08-04 | End: 2018-08-09 | Stop reason: HOSPADM

## 2018-08-04 RX ORDER — ACETAMINOPHEN 500 MG
1000 TABLET ORAL EVERY 8 HOURS PRN
Status: DISCONTINUED | OUTPATIENT
Start: 2018-08-04 | End: 2018-08-09 | Stop reason: HOSPADM

## 2018-08-04 RX ORDER — MIRTAZAPINE 15 MG/1
15 TABLET, FILM COATED ORAL AT BEDTIME
Status: DISCONTINUED | OUTPATIENT
Start: 2018-08-04 | End: 2018-08-09 | Stop reason: HOSPADM

## 2018-08-04 RX ADMIN — POTASSIUM CHLORIDE 40 MEQ: 1.5 POWDER, FOR SOLUTION ORAL at 11:59

## 2018-08-04 RX ADMIN — CYANOCOBALAMIN TAB 1000 MCG 1000 MCG: 1000 TAB at 09:48

## 2018-08-04 RX ADMIN — SPIRONOLACTONE 100 MG: 100 TABLET, FILM COATED ORAL at 13:39

## 2018-08-04 RX ADMIN — FOLIC ACID 1 MG: 1 TABLET ORAL at 09:48

## 2018-08-04 RX ADMIN — Medication 400 MG: at 09:48

## 2018-08-04 RX ADMIN — SUCRALFATE 1 G: 1 SUSPENSION ORAL at 22:05

## 2018-08-04 RX ADMIN — GABAPENTIN 300 MG: 300 CAPSULE ORAL at 09:47

## 2018-08-04 RX ADMIN — Medication 100 MG: at 09:47

## 2018-08-04 RX ADMIN — POTASSIUM CHLORIDE 20 MEQ: 1.5 POWDER, FOR SOLUTION ORAL at 14:40

## 2018-08-04 RX ADMIN — GABAPENTIN 600 MG: 600 TABLET, FILM COATED ORAL at 20:40

## 2018-08-04 RX ADMIN — SUCRALFATE 1 G: 1 SUSPENSION ORAL at 17:43

## 2018-08-04 RX ADMIN — LORAZEPAM 1 MG: 1 TABLET ORAL at 20:40

## 2018-08-04 RX ADMIN — HYDROXYZINE HYDROCHLORIDE 25 MG: 25 TABLET ORAL at 12:39

## 2018-08-04 RX ADMIN — SUCRALFATE 1 G: 1 SUSPENSION ORAL at 13:39

## 2018-08-04 RX ADMIN — MULTIPLE VITAMINS W/ MINERALS TAB 1 TABLET: TAB at 09:47

## 2018-08-04 RX ADMIN — MIRTAZAPINE 15 MG: 15 TABLET, FILM COATED ORAL at 22:05

## 2018-08-04 RX ADMIN — LORAZEPAM 1 MG: 1 TABLET ORAL at 14:48

## 2018-08-04 NOTE — PROGRESS NOTES
Admit- once on the unit patient was very anxious, is on CIWA scored 17, after 2mg ativan she scored 6.  Once calm she was able to complete the admission assessment.  She is aware of her alcohol abuse and knows her stressors drive the anxiety that drive her drinking.  She denies SI and contracts for safety.

## 2018-08-04 NOTE — H&P
"[facility]    History and Physical  Hospitalist       Date of Admission:  8/3/2018    Assessment & Plan   Kavitha Headley is a 72 year old female who presented to [facility] on 8/3/2018 for evaluation and management of alcohol withdrawal. She endorsed 4- days of tremors, nausea, vomiting, and diarrhea after attempting to quit drinking on her own. She denies concern for withdrawal seizure. She was deemed a suitable candidate for  Mental Health Admission as she has been drinking alcohol in an attempt to self-medicate severe anxiety and depression. The Hospitalist Service has been consulted for H&P.     Anxiety  Depression  Alcohol abuse   Ms. Headley endorses increasing anxiety and depression over the past several months. She admits to self-medicating with demetrice, 3-4 drinks daily. She has been attempting to cut back and on 7/30/2018 decided to quit drinking after having 2- glasses of wine on 7/29/2018. On Monday she developed \"the shakes,\" nausea, vomiting, and diarrhea. She specifically denies abdominal pain, endorsed having abdominal cramping. She tells me her nausea, vomiting, and diarrhea have resolved and she has no acute abdominal findings on exam. She is scoring on CIWA with adequate control with PO Ativan.   - full plan of psychiatric care per Dr. Funk     Concern for alcoholic hepatitis, ALT 93 and   Ms. Headley notes prior transaminitis secondary to heavy alcohol use. Per EMR review AST was as high as 338, ALT as high as 160 in June 2017 with normalization within several days of not drinking alcohol. I had a very paras discussion with Ms. Headley that her liver enzymes are evidence of liver disease secondary to alcohol abuse and will likely normalize if she is able to abstain from drinking. On exam her abdomen is non-tender, no notable hepatosplenomegaly, and she is non-icteric.   - highly recommend close post-hospital follow-up with PCP  - highly recommend abstaining " from alcohol use     Hypokalemia, mild, 3.3, likely secondary to aldactone   Chronic, stable.  - replete per protocol  - will replete magnesium to 2.0 given personal history of SVT     Hypertension, treated  Hyperlipidemia, not treated  Chronic, stable.   - okay to continue home aldactone  - not currently on statin therapy and would avoid until transaminitis resolves     Chronic kidney disease, stage III  Chronic, stable. She has never required dialysis.   - optimize hydration, avoid hypotension   - avoid nephrotoxic agents, renal dosing if appropriate   - avoid NSAIDs, including ibuprofen    Personal history of macrocytic anemia status post gastric bypass   Chronic, stable.  - continue home cyanocobalamin     Personal history of lower extremity edema  Ms. Headley states she occasionally takes Lasix PO for lower extremity edema. No obvious lower extremity edema on exam.  - okay to use PRN Lasix upon her request     Personal history of thoracis and abdominal lymphadenopathy  Status post mediastinoscopy  Chronic, stable. Ms. Headley tells me she is to have routine surveillance imaging with Dr. Pa, but has no done so recently. She asked about a CT while inpatient, however I find no obvious reason to do a CT scan acutely.   - highly advised to follow-up with Dr. Pa as an outpatient for surveillance     # Pain Assessment:  Current Pain Score 8/4/2018   Patient currently in pain? denies   Pain score (0-10) 3   Pain location -   Pain descriptors -   Kavitha gloria pain level was assessed and she currently denies pain.        DVT Prophylaxis: Ambulate every shift  Code Status: Full Code    Disposition: Per Psychiatry    The above assessment and plan has been discussed with Dr. Damon, who is in agreement with the above assessment and plan.     ARISTEO De La Rosa, CNP  Hospitalist Service, House Officer  [facility]    Patient seen and examined.  Agree with impression and plan.     Christian Otto  "[facility]  Pager: [phone]  Cell Phone:  [phone]       Text Page  Pager: [phone]    Primary Care Physician   Dr. Nayeli Castorena    Chief Complaint   Alcohol withdrawal and depression    History is obtained from the patient.     History of Present Illness   Kavitha Headley is a 72 year old female who presented to [facility] for evaluation of alcohol withdrawal and nausea, vomiting, and diarrhea with onset Monday. She endorses heavy alcohol use for an extended amount of time to self- medicate worsening anxiety and depression. Her last drink was 7/29/2018 with development of nausea, vomiting, diarrhea, abdominal cramping, and \"the shakes\" on 7/30/2018. She has never had a withdrawal seizure. Her nausea resolved with Reglan in the ED, last episode of vomiting was Wednesday, and last diarrhea Thursday.     Past Medical History    I personally reviewed history with patient:  - alcohol abuse  - hyperlipidemia  - hypertension  - CKD, III  - anxiety and depression  - macrocytic anemia  - SVT  - aortic stenosis, moderate     Past Surgical History   I personally reviewed history with patient:  - cholecystectomy  - appendectomy  - gastric bypass  - hysterectomy  - mediastinoscopy  - hip arthroscopy  - colonoscopy  - hernia repair  - mammoplasty     Prior to Admission Medications   Prior to Admission Medications   Prescriptions Last Dose Informant Patient Reported? Taking?   Cyanocobalamin (B-12) 1000 MCG TBCR Past Month at Unknown time  No Yes   Sig: Take 1,000 mcg by mouth daily   GABAPENTIN PO 8/2/2018 at Unknown time  Yes Yes   Sig: Take 600 mg by mouth every evening   Multiple Vitamins-Minerals (CENTRUM SILVER) per tablet Past Month at Unknown time  Yes Yes   Sig: Take 1 tablet by mouth daily   diphenhydrAMINE (BENADRYL) 25 MG capsule 8/2/2018 at Unknown time  Yes Yes   Sig: Take 25 mg by mouth nightly as needed   folic acid (FOLVITE) 1 MG tablet Past Month at Unknown time  No Yes   Sig: Take 1 " tablet (1 mg) by mouth daily   furosemide (LASIX) 20 MG tablet More than a month at Unknown time  No No   Sig: TAKE 1 TABLET (20 MG) BY MOUTH DAILY   gabapentin (NEURONTIN) 300 MG capsule 2018 at Unknown time  No Yes   Sig: Take 1 capsule (300 mg) by mouth 3 times daily   Patient taking differently: Take 300 mg by mouth every morning    ketoconazole (NIZORAL) 2 % shampoo 2018 at Unknown time  No Yes   Sig: Apply to the affected area and wash off after 5 minutes.   magnesium oxide (MAG-OX) 400 MG tablet 2018 at Unknown time  No Yes   Sig: Take 1 tablet (400 mg) by mouth daily   melatonin 3 MG tablet 2018 at Unknown time  Yes Yes   Sig: Take 9 mg by mouth nightly as needed for sleep    polyethylene glycol (MIRALAX/GLYCOLAX) Packet Past Month at Unknown time  No Yes   Sig: Take 17 g by mouth daily as needed (constipation)   senna-docusate (SENOKOT-S;PERICOLACE) 8.6-50 MG per tablet More than a month at Unknown time  No No   Sig: Take 1-2 tablets by mouth 2 times daily as needed for constipation   spironolactone (ALDACTONE) 100 MG tablet More than a month at Unknown time  Yes No   Si mg daily   sucralfate (CARAFATE) 1 GM/10ML suspension More than a month at Unknown time  Yes No   Sig: Take 10 mLs (1 g) by mouth 4 times daily (before meals and nightly) Pt takes once daily   temazepam (RESTORIL) 7.5 MG capsule Past Month at Unknown time  No Yes   Sig: Take 1 capsule (7.5 mg) by mouth nightly as needed for sleep   thiamine 100 MG tablet Past Month at Unknown time  No Yes   Sig: Take 1 tablet (100 mg) by mouth daily   traZODone (DESYREL) 100 MG tablet 2018 at Unknown time  No Yes   Sig: TAKE 1 TABLET (100 MG) BY MOUTH NIGHTLY AS NEEDED FOR SLEEP   valACYclovir (VALTREX) 1000 mg tablet More than a month at Unknown time  No No   Sig: TAKE 2 TABS EVERY 12 HRS FOR 1 DAY ONLY FOR OUTBREAKS OF COLD SORES as needed   vortioxetine (BRINTELLIX) 10 MG tablet Past Month at Unknown time  No Yes   Sig: Take 1  tablet (10 mg) by mouth daily      Facility-Administered Medications: None     Allergies   Allergies   Allergen Reactions     Quetiapine Anxiety     Bactrim [Sulfamethoxazole W/Trimethoprim] Hives     Codeine Itching     NAUSEA     Morphine Itching     NAUSEA       Social History   I personally reviewed history with patient:  - lives locally  - is a , her daughter is involved  - lifelong non-smoker  - current heavy alcohol use, denies other drugs  - retired labor and delivery RN     Family History   I personally reviewed history with patient:  - father: substance abuse and throat/lung cancer    Review of Systems   The 10 point Review of Systems is negative other than noted in the HPI or here.     Physical Exam   Temp: 98.1  F (36.7  C)   BP: 132/42 Pulse: 95 Heart Rate: 97 Resp: 16 SpO2: 95 % O2 Device: None (Room air)    Vital Signs with Ranges  Temp:  [98.1  F (36.7  C)-98.9  F (37.2  C)] 98.1  F (36.7  C)  Pulse:  [] 95  Heart Rate:  [] 97  Resp:  [16-18] 16  BP: (111-150)/(42-96) 132/42  SpO2:  [95 %-99 %] 95 %  176 lbs 0 oz    General: Appears stated age, no acute distress.  Skin:  Warm, dry. No rashes or lesions on exposed skin.  HEENT:  Normocephalic, atraumatic.  Chest:  Bilateral anterior and posterior lung fields clear to auscultation. No increased work of breathing. Does not require supplemental oxygen.  Cardiovascular:  Regular rate and rhythm, without murmur, rub, or gallop. Bilateral upper and lower distal pulses palpable.   Abdomen:  Soft, non-tender, non-distended. Bowel sounds present.   Musculoskeletal:  Moves all four extremities.   Neurological:  Alert and oriented x 4. Cranial nerves II-XII grossly intact.   Psychiatric:  Affect and mood congruent.    Data   Data reviewed today:  I personally reviewed no images or EKG's today.    Recent Labs  Lab 08/04/18  0900 08/03/18  1300   WBC  --  6.4   HGB  --  12.8   MCV  --  105*   PLT  --  173    138   POTASSIUM 3.3* 3.3*    CHLORIDE 103 97   CO2 30 30   BUN 7 6*   CR 0.72 0.93   ANIONGAP 8 11   BIRGIT 8.3* 9.0   GLC 81 85   ALBUMIN  --  3.0*   PROTTOTAL  --  6.9   BILITOTAL  --  1.1   ALKPHOS  --  139   ALT  --  93*   AST  --  153*

## 2018-08-04 NOTE — PROGRESS NOTES
"   08/03/18 1924   Patient Belongings   Did you bring any home meds/supplements to the hospital?  No   Patient Belongings cell phone/electronics;clothing   Disposition of Belongings Locker   Belongings Search Yes   Clothing Search Yes   Second Staff Marge     Francisca  Hat  Pants  Shirt  Cell phone  Insurance cards  [place] 's license  Loose change  Glasses    Security envelope #[id number]  Visa [phone]  Visa [phone]  Visa [phone]  Am ex [phone]  [employer] [phone]    Security envelope # [id number]  Gold 18\" necklace      Admission:  I am responsible for any personal items that are not sent to the safe or pharmacy. [facility] is not responsible for loss, theft or damage of any property in my possession.        Patient Signature: ___________________________________________      Date/Time:__________________________     Staff Signature: __________________________________      Date/Time:__________________________     [facility] Staff person, if patient is unable/unwilling to sign:      __________________________________________________________      Date/Time: __________________________        Discharge:  [facility] has returned all of my personal belongings:     Patient Signature: ________________________________________     Date/Time: ____________________________________     Staff Signature: ______________________________________     Date/Time:_____________________________________    "

## 2018-08-04 NOTE — PLAN OF CARE
Problem: Depressive Symptoms  Goal: Depressive Symptoms  Signs and symptoms of listed problems will be absent or manageable.   Outcome: No Change  Pt presents with a flat and blunted affect on unit. Groups were attended and pt participated appropriately. Pt made comment about wanting tequila in her orange juice this morning to make it better. Pt showered and spent the afternoon in her room.

## 2018-08-04 NOTE — PROGRESS NOTES
Ciwa score 13 at bedtime; received Ativan 2mg due to tremors, nausea, headache, difficulty focusing. Pt states feels like she is also withdrawing from Temazepam in addition to ETOH. Pt has been reading , did not attend groups yet.

## 2018-08-04 NOTE — H&P
"Admitted:     08/03/2018      CHIEF COMPLAINT:  Alcohol use disorder and depression.      HISTORY OF PRESENT ILLNESS:  Kavitha Headley is a 72-year-old female with a psychiatric history of depression and alcohol use disorder and a medical history to include stage III chronic kidney disease and hyperlipidemia.  The patient presented to our emergency department yesterday afternoon with complaints of nausea, vomiting and diarrhea.  She received IV antiemetics and fluids and in the midst of the workup began endorsing complaints of worsening depression and alcohol abuse.  She was subsequently admitted to Psychiatry.      Since admission, the patient has been on the CIWA protocol.  She has received 2 mg of Ativan yesterday at 6:00 p.m. and another 2 mg at 10:30 p.m. per the CIWA protocol.  She has otherwise remained stable, pleasant and cooperative.  She has not had any history of complicated alcohol withdrawal to include seizures or delirium tremens.      On interview, the patient concedes that she has been having a difficult last several months.  She reports starting to drink alcohol in her 50s (prior she was a recreational drinker) in part due to the transition of her kids leaving the house and her being an \"empty nest.\"  Drinking has ebbed and flowed throughout the years.  Historically she was also used utilizing it to help her sleep as she reports chronic sleep difficulties.  The patient reports most recent and only chemical dependency treatment was a 1-week inpatient stay at [facility] about a year ago.  Upon discharge, she was able to maintain sobriety,  but was also being prescribed temazepam as a sleep aid.  Ultimately, her primary care provider was no longer comfortable prescribing this medication and states would not do so without the blessing of a psychiatrist and this led to the medication going away and her drinking again.  She estimates drinking about 3 demetrice and sodas per day.  Complicating some of her " "difficulties include increasing financial stress.  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  She reports a lot of credit card debt and she is considering bankruptcy.  This has led to her feeling rather overwhelmed and the addition of alcohol at this is just led to further difficulties with respect to mood and functioning.  She normally lives independently, but has a boyfriend (her  passed away 9 years ago) whom stays with her on the weekends, though he suffers from alcohol problems as well.  The patient is visibly tremulous in her hands on interview and she does concede to feeling a bit on edge and tremulous.  She attributes this to the alcohol withdrawal.  Over the past few months, she is endorsing depressive complaints to include not having much motivation to do anything and just sitting and watching TV \"trying to hold things together.\"  The patient does note that she was on Remeron for about 10 years and found it effective for both mood and sleep, but this was inexplicably transitioned to Trintellix, which was not helpful and as such, the patient has not been taking the medication for about a month.      PAST PSYCHIATRIC HISTORY:  Alcohol use disorder, anxiety, depression.  Has reportedly done inpatient rehabilitation approximately 1 year ago at [facility] for 1 week.  She tells me therapy is being arranged by a  in the ED.  She also has a referral for a psychiatrist whom she has yet to meet scheduled for 08/22/2018.      PAST CHEMICAL DEPENDENCY HISTORY:  The patient was a recreational drinker until her 50s after which she began drinking more steadily.  Thought in part due to the transition with her children leaving the house, however, she also used it to help with longstanding sleep complaints.  Her drinking has been slow through the years.  Her most recent period of sobriety " began about a year ago, though lapsed in 03/2018 and has resumed drinking about 3 brandies and sodas per day.      PAST MEDICAL HISTORY:  Hyperlipidemia, chronic kidney disease, stage III.      FAMILY HISTORY:  Father:  Substance abuse.      SOCIAL HISTORY:  The patient is a retired nurse.  Her  passed away about 9 years ago; however, she has a boyfriend who stays with her on the weekends.  Her boyfriend has issues with alcohol as well.  She also has 3 children; however, her oldest daughter passed away in the spring.      REVIEW OF SYSTEMS:  10-point review of systems was completed and is negative, other than noted in HPI.      PHYSICAL EXAMINATION:   VITAL SIGNS:  Temperature 98.1, pulse 95, respirations 16, blood pressure 132/42, saturating 95% on room air.      MEDICATIONS:   1.  Cyanocobalamin 1000 mcg daily.   2.  Diphenhydramine 25 mg at bedtime p.r.n.   3.  Folic acid 1 mg daily.   4.  Furosemide 1 mg daily.   5.  Gabapentin 300 mg in the morning and 600 mg at bedtime.   6.  Magnesium oxide 400 mg daily.   7.  Melatonin 9 mg at bedtime p.r.n. sleep.   8.  Multivitamin.   9.  Spironolactone 100 mg daily.   10.  Thiamine 100 mg daily.      MENTAL STATUS EXAMINATION:  Age appearing female dressed in hospital scrubs with appropriate hygiene.  She was calm and cooperative with good eye contact.  Mild distal bilateral tremor in her upper extremities.  She was fully oriented.  The patient was cooperative, forthcoming and a seemingly reliable historian.  The patient does endorse feeling depressed and anxious.  Her affect was mood congruent stable and appropriately reactive.  Speech was fluent, spontaneous clear, nonpressured.  Thought process was linear, logical and goal directed.  Thought content demonstrated no observations of delusional content.  No observation of response to internal stimuli.  No fluctuation in cognition appreciated.  Intelligence was estimated as average by way of vocabulary and  conversational understanding.  Memory was intact to recent and distant events.  Attention and concentration were well maintained.  Insight is preserved as well as judgment.  She denies any suicidal or homicidal ideation, intent or plan.      ASSESSMENT:  Kavitha Jasso is a 72-year-old female with a history of depression, anxiety and alcohol use disorder who was admitted from the Emergency Department after treatment for likely alcohol withdrawal symptoms.  She is stabilizing medically and is currently on the CIWA protocol.  She is feeling subtle improvement with the passage of time and the assistance of benzodiazepines for withdrawal.  She is not overly interested in outpatient chemical dependency treatment, but she is acquiring resources for mental health cares to include a therapist that is reportedly being arranged from the Emergency Department as well as psychiatric followup that has been previously arranged.  I do not feel that the patient should be on benzodiazepines for sleep and agree with the previous plan of discontinuing temazepam to assist in this regard.  She has not been taking Trintellix as it was not beneficial and this will remain discontinued.  I would like to begin Remeron to assist with sleep as this has historically been beneficial for her, both with respect to sleep and mood.      DIAGNOSES:   1.  Alcohol withdrawal, improving.   2.  Alcohol use disorder.   3.  Major depression.   4.  Generalized anxiety.      PLAN:   1.  Begin Remeron 15 mg each day at bedtime.   2.  Continue CIWA protocol.   3.  Temazepam to remain discontinued.   4.  Discontinue Trazodone (not effective for her).   5.  An IM consult has previously been placed.         VISH RESENDEZ DO             D: 2018   T: 2018   MT: SARA      Name:     KAVITHA JASSO   MRN:      [id number]        Account:      [id number]   :      1945        Admitted:     2018                   Document: [id number]

## 2018-08-04 NOTE — PROGRESS NOTES
Pt presents with a flat and blunted affect on unit. Groups were attended and pt participated appropriately. Pt made comment about wanting tequila in her orange juice this morning to make it better. Pt showered and spent the afternoon in her room. Pt scored 6 and 11 on CIWA in am and Noon respectively. Pt is noted to be with tremors in the upper extremities. Pt has received 60 mEq of potassium po and will need a lab draw at 6 pm tonight then in AM.

## 2018-08-05 PROCEDURE — 25000132 ZZH RX MED GY IP 250 OP 250 PS 637: Mod: GY | Performed by: PSYCHIATRY & NEUROLOGY

## 2018-08-05 PROCEDURE — A9270 NON-COVERED ITEM OR SERVICE: HCPCS | Mod: GY | Performed by: PSYCHIATRY & NEUROLOGY

## 2018-08-05 PROCEDURE — 12400006 ZZH R&B MH INTERMEDIATE

## 2018-08-05 PROCEDURE — 25000132 ZZH RX MED GY IP 250 OP 250 PS 637: Mod: GY | Performed by: NURSE PRACTITIONER

## 2018-08-05 PROCEDURE — A9270 NON-COVERED ITEM OR SERVICE: HCPCS | Mod: GY | Performed by: NURSE PRACTITIONER

## 2018-08-05 RX ADMIN — SUCRALFATE 1 G: 1 SUSPENSION ORAL at 22:08

## 2018-08-05 RX ADMIN — LORAZEPAM 1 MG: 1 TABLET ORAL at 17:17

## 2018-08-05 RX ADMIN — MELATONIN TAB 3 MG 9 MG: 3 TAB at 22:08

## 2018-08-05 RX ADMIN — LORAZEPAM 1 MG: 1 TABLET ORAL at 19:22

## 2018-08-05 RX ADMIN — DIPHENHYDRAMINE HYDROCHLORIDE 25 MG: 25 CAPSULE ORAL at 22:08

## 2018-08-05 RX ADMIN — GABAPENTIN 300 MG: 300 CAPSULE ORAL at 10:11

## 2018-08-05 RX ADMIN — SUCRALFATE 1 G: 1 SUSPENSION ORAL at 17:08

## 2018-08-05 RX ADMIN — HYDROXYZINE HYDROCHLORIDE 25 MG: 25 TABLET ORAL at 00:14

## 2018-08-05 RX ADMIN — SUCRALFATE 1 G: 1 SUSPENSION ORAL at 10:10

## 2018-08-05 RX ADMIN — SUCRALFATE 1 G: 1 SUSPENSION ORAL at 12:06

## 2018-08-05 RX ADMIN — GABAPENTIN 600 MG: 600 TABLET, FILM COATED ORAL at 20:53

## 2018-08-05 RX ADMIN — Medication 100 MG: at 10:11

## 2018-08-05 RX ADMIN — LORAZEPAM 1 MG: 1 TABLET ORAL at 22:08

## 2018-08-05 RX ADMIN — HYDROXYZINE HYDROCHLORIDE 25 MG: 25 TABLET ORAL at 16:21

## 2018-08-05 RX ADMIN — CYANOCOBALAMIN TAB 1000 MCG 1000 MCG: 1000 TAB at 10:11

## 2018-08-05 RX ADMIN — Medication 400 MG: at 10:11

## 2018-08-05 RX ADMIN — HYDROXYZINE HYDROCHLORIDE 25 MG: 25 TABLET ORAL at 12:06

## 2018-08-05 RX ADMIN — MULTIPLE VITAMINS W/ MINERALS TAB 1 TABLET: TAB at 10:11

## 2018-08-05 RX ADMIN — MIRTAZAPINE 15 MG: 15 TABLET, FILM COATED ORAL at 22:08

## 2018-08-05 RX ADMIN — HYDROXYZINE HYDROCHLORIDE 25 MG: 25 TABLET ORAL at 22:08

## 2018-08-05 RX ADMIN — SPIRONOLACTONE 100 MG: 100 TABLET, FILM COATED ORAL at 10:11

## 2018-08-05 RX ADMIN — FOLIC ACID 1 MG: 1 TABLET ORAL at 10:11

## 2018-08-05 NOTE — PLAN OF CARE
Problem: Depressive Symptoms  Goal: Depressive Symptoms  Signs and symptoms of listed problems will be absent or manageable.   Outcome: No Change  Pt presents with a flat affect and a depressed mood. Pt's mood improved when her fiance visited. Pt attended wrap up group and participated appropriately. Pt is still scoring on CIWA. Pt was withdrawn and isolative and spent most of the shift in her room and was not happy that she would have a roommate.

## 2018-08-05 NOTE — PLAN OF CARE
Problem: Depressive Symptoms  Goal: Depressive Symptoms  Signs and symptoms of listed problems will be absent or manageable.   Outcome: No Change  Pt has been present and pleasant throughout the day shift in the SDU; has been in and out of her room; keeping to herself. Did not attend any groups and presents a flat affect. Ate all of her meals and did not shower.

## 2018-08-06 PROCEDURE — 90853 GROUP PSYCHOTHERAPY: CPT

## 2018-08-06 PROCEDURE — 25000132 ZZH RX MED GY IP 250 OP 250 PS 637: Mod: GY | Performed by: PSYCHIATRY & NEUROLOGY

## 2018-08-06 PROCEDURE — 12400006 ZZH R&B MH INTERMEDIATE

## 2018-08-06 PROCEDURE — 25000132 ZZH RX MED GY IP 250 OP 250 PS 637: Mod: GY | Performed by: NURSE PRACTITIONER

## 2018-08-06 PROCEDURE — A9270 NON-COVERED ITEM OR SERVICE: HCPCS | Mod: GY | Performed by: PSYCHIATRY & NEUROLOGY

## 2018-08-06 PROCEDURE — A9270 NON-COVERED ITEM OR SERVICE: HCPCS | Mod: GY | Performed by: NURSE PRACTITIONER

## 2018-08-06 RX ORDER — DISULFIRAM 250 MG/1
250 TABLET ORAL DAILY
Status: DISCONTINUED | OUTPATIENT
Start: 2018-08-06 | End: 2018-08-09 | Stop reason: HOSPADM

## 2018-08-06 RX ORDER — QUETIAPINE FUMARATE 25 MG/1
25 TABLET, FILM COATED ORAL AT BEDTIME
Status: DISCONTINUED | OUTPATIENT
Start: 2018-08-06 | End: 2018-08-09 | Stop reason: HOSPADM

## 2018-08-06 RX ADMIN — SPIRONOLACTONE 100 MG: 100 TABLET, FILM COATED ORAL at 08:30

## 2018-08-06 RX ADMIN — SUCRALFATE 1 G: 1 SUSPENSION ORAL at 21:52

## 2018-08-06 RX ADMIN — MELATONIN TAB 3 MG 9 MG: 3 TAB at 21:52

## 2018-08-06 RX ADMIN — MULTIPLE VITAMINS W/ MINERALS TAB 1 TABLET: TAB at 08:30

## 2018-08-06 RX ADMIN — SUCRALFATE 1 G: 1 SUSPENSION ORAL at 17:14

## 2018-08-06 RX ADMIN — LORAZEPAM 1 MG: 1 TABLET ORAL at 08:39

## 2018-08-06 RX ADMIN — LORAZEPAM 1 MG: 1 TABLET ORAL at 21:57

## 2018-08-06 RX ADMIN — GABAPENTIN 600 MG: 600 TABLET, FILM COATED ORAL at 19:54

## 2018-08-06 RX ADMIN — LORAZEPAM 1 MG: 1 TABLET ORAL at 11:51

## 2018-08-06 RX ADMIN — DISULFIRAM 250 MG: 250 TABLET ORAL at 09:41

## 2018-08-06 RX ADMIN — GABAPENTIN 300 MG: 300 CAPSULE ORAL at 08:30

## 2018-08-06 RX ADMIN — Medication 400 MG: at 08:30

## 2018-08-06 RX ADMIN — MIRTAZAPINE 15 MG: 15 TABLET, FILM COATED ORAL at 21:52

## 2018-08-06 RX ADMIN — SUCRALFATE 1 G: 1 SUSPENSION ORAL at 08:30

## 2018-08-06 RX ADMIN — SUCRALFATE 1 G: 1 SUSPENSION ORAL at 11:47

## 2018-08-06 RX ADMIN — FOLIC ACID 1 MG: 1 TABLET ORAL at 08:30

## 2018-08-06 RX ADMIN — HYDROXYZINE HYDROCHLORIDE 25 MG: 25 TABLET ORAL at 17:22

## 2018-08-06 RX ADMIN — Medication 100 MG: at 08:30

## 2018-08-06 RX ADMIN — QUETIAPINE FUMARATE 25 MG: 25 TABLET ORAL at 21:52

## 2018-08-06 RX ADMIN — CYANOCOBALAMIN TAB 1000 MCG 1000 MCG: 1000 TAB at 08:30

## 2018-08-06 ASSESSMENT — ACTIVITIES OF DAILY LIVING (ADL)
DRESS: INDEPENDENT
ORAL_HYGIENE: INDEPENDENT
GROOMING: INDEPENDENT

## 2018-08-06 NOTE — PLAN OF CARE
Problem: Depressive Symptoms  Goal: Depressive Symptoms  Signs and symptoms of listed problems will be absent or manageable.   Outcome: No Change  Pt presents with a flat affect and a calm and then depressed mood but pt brightens upon approach. Pt has been pleasant and cooperative.  Pt spent the majority of the shift in her room resting and reading. Pt had a visitor that brought her food and asked questions about when he could pick her up on Mon. Staff informed visitor and pt that she needs to meed with her Dr. First. Pt is scoring on CIWA.

## 2018-08-06 NOTE — PROGRESS NOTES
[facility] Psychiatric Progress Note       Interim History     The patient's care was discussed with the treatment team and chart notes were reviewed. Pt seen on 77. Pt went to extended care at [facility] living in [place]. Didn't drink was waiting for an openning at [facility].  Finally went to [facility] for 1 week.  Lasted 3 months. Has extreme finacial disaster and will need to file bankruptcy.  She thought the Remeron was helpful and her Primary wouldn't give it to her. She didn't stay on the Trinellix 2nd to cost was only on 10mg.  She lives wih a significant other he onlly lives with her part time. He goes to take care of his mother 3 days a week. He doesn't drink. Suggested Antabuse 250mg. Seroquel was also helpful, but her  was worried what the package insert warnings.      Hospital Course           Medications     Current Facility-Administered Medications Ordered in Epic   Medication Dose Route Frequency Last Rate Last Dose     acetaminophen (TYLENOL) tablet 1,000 mg  1,000 mg Oral Q8H PRN         cyanocobalamin (vitamin  B-12) tablet 1,000 mcg  1,000 mcg Oral Daily   1,000 mcg at 08/06/18 0830     diphenhydrAMINE (BENADRYL) capsule 25 mg  25 mg Oral At Bedtime PRN   25 mg at 08/05/18 2208     folic acid (FOLVITE) tablet 1 mg  1 mg Oral Daily   1 mg at 08/06/18 0830     furosemide (LASIX) tablet 20 mg  20 mg Oral Daily PRN         gabapentin (NEURONTIN) capsule 300 mg  300 mg Oral QAM   300 mg at 08/06/18 0830     gabapentin (NEURONTIN) tablet 600 mg  600 mg Oral QPM   600 mg at 08/05/18 2053     hydrOXYzine (ATARAX) tablet 25 mg  25 mg Oral Q4H PRN   25 mg at 08/05/18 2208     LORazepam (ATIVAN) tablet 1-2 mg  1-2 mg Oral Q30 Min PRN   1 mg at 08/06/18 0839    Or     LORazepam (ATIVAN) injection 1-2 mg  1-2 mg Intravenous Q30 Min PRN         magnesium oxide (MAG-OX) tablet 400 mg  400 mg Oral Daily   400 mg at 08/06/18 0830     magnesium sulfate 2 g in water intermittent infusion  2 g  "Intravenous Daily PRN         magnesium sulfate 4 g in 100 mL sterile water (premade)  4 g Intravenous Q4H PRN         melatonin tablet 9 mg  9 mg Oral At Bedtime PRN   9 mg at 08/05/18 2208     mirtazapine (REMERON) tablet 15 mg  15 mg Oral At Bedtime   15 mg at 08/05/18 2208     multivitamin, therapeutic with minerals (THERA-VIT-M) tablet 1 tablet  1 tablet Oral Daily   1 tablet at 08/06/18 0830     polyethylene glycol (MIRALAX/GLYCOLAX) Packet 17 g  17 g Oral Daily PRN         potassium chloride (KLOR-CON) Packet 20-40 mEq  20-40 mEq Oral or Feeding Tube Q2H PRN   20 mEq at 08/04/18 1440     potassium chloride 10 mEq in 100 mL intermittent infusion with 10 mg lidocaine  10 mEq Intravenous Q1H PRN         potassium chloride 10 mEq in 100 mL sterile water intermittent infusion (premix)  10 mEq Intravenous Q1H PRN         potassium chloride 20 mEq in 50 mL intermittent infusion  20 mEq Intravenous Q1H PRN         potassium chloride SA (K-DUR/KLOR-CON M) CR tablet 20-40 mEq  20-40 mEq Oral Q2H PRN         senna-docusate (SENOKOT-S;PERICOLACE) 8.6-50 MG per tablet 1-2 tablet  1-2 tablet Oral BID PRN         sodium chloride 0.9% infusion  1,000 mL Intravenous Continuous   Stopped at 08/03/18 1809     spironolactone (ALDACTONE) tablet 100 mg  100 mg Oral Daily   100 mg at 08/06/18 0830     sucralfate (CARAFATE) suspension 1 g  1 g Oral 4x Daily AC & HS   1 g at 08/06/18 0830     thiamine tablet 100 mg  100 mg Oral Daily   100 mg at 08/06/18 0830     No current Epic-ordered outpatient prescriptions on file.         Allergies      Allergies   Allergen Reactions     Quetiapine Anxiety     Bactrim [Sulfamethoxazole W/Trimethoprim] Hives     Codeine Itching     NAUSEA     Morphine Itching     NAUSEA        Medical Review of Systems     /83  Pulse 95  Temp 97.9  F (36.6  C) (Oral)  Resp 15  Ht 1.626 m (5' 4\")  Wt 79.8 kg (176 lb)  SpO2 95%  BMI 30.21 kg/m2  Body mass index is 30.21 kg/(m^2).  A 10-point review of " systems was performed by Colton Vargas MD and is negative, no new findings.      Psychiatric Examination     Appearance Sitting in chair, dressed in clothes. Appears stated age.   Attitude Cooperative   Orientation Oriented to person, place, time   Eye Contact Poor   Speech Regular rate, rhythm, volume and tone   Language Normal   Psychomotor Behavior Normal   Mood depressed   Affect depressed   Thought Process Goal-Oriented, Intact   Associations Intact   Thought Content Patient is currently negative for suicidal ideation, negative for plan or intent, able to contract no self harm and identify barriers to suicide.  Negative for obsessions, compulsions or psychosis.     Fund of Knowledge intact   Insight impaired   Judgement impaired   Attention Span & Concentration poor   Recent & Remote Memory intact   Gait Normal   Muscle Tone Intact        Labs     Labs reviewed.  No results found for this or any previous visit (from the past 24 hour(s)).     Impression     This is a 72 year old female with Major depression, recurrent, severe, without psychotic features.and Alcohol Use disorder. Failed attempts to quit and non-compliant with her meds.        Diagnoses     1. Major depression, recurrent, severe, without psychotic features.  2. Alcohol Use Disorder           Plan     1. Explained side effects, benefits, and complications of medications to the patient, Pt gave verbal consent.  2. Medication changes: Remeron 7.5mg hs seroquel 25mg hs  3. Antabuse 250mg  Pt have SO watch her take it.   4. Discussed treatment plan with patient and team.  5. Projected length of stay: 5 days      Attestation:   Patient has been seen and evaluated by me, Colton Vargas MD.    Patient ID:  Name: Kavitha Headley    MRN: [id number]  Admission: 8/3/2018   YOB: 1945

## 2018-08-06 NOTE — PLAN OF CARE
Problem: General Plan of Care (Inpatient Behavioral)  Goal: Team Discussion  Team Plan:    Outcome: No Change  BEHAVIORAL TEAM DISCUSSION    Participants: Dr. Vargas, Case Management, Nursing Staff, PA's  Progress: No change; scoring on CIWA  Continued Stay Criteria/Rationale: Scoring on CIWA, med compliance  Medical/Physical: CIWA and med compliance  Precautions:   Behavioral Orders   Procedures     Code 1 - Restrict to Unit     Routine Programming     As clinically indicated     Status 15     Every 15 minutes.     Withdrawal precautions     Plan: Continue stay  Rationale for change in precautions or plan: Med compliance and still scoring on CIWA      Problem: Patient Care Overview  Goal: Team Discussion  Team Plan:    Outcome: No Change  BEHAVIORAL TEAM DISCUSSION    Participants: Dr. Vargas, Case Management, Nursing Staff, PA's  Progress: No change; scoring on CIWA  Continued Stay Criteria/Rationale: Scoring on CIWA, med compliance  Medical/Physical: CIWA and med compliance  Precautions:   Behavioral Orders   Procedures     Code 1 - Restrict to Unit     Routine Programming     As clinically indicated     Status 15     Every 15 minutes.     Withdrawal precautions     Plan: Continue stay  Rationale for change in precautions or plan: Med compliance and still scoring on CIWA

## 2018-08-06 NOTE — PLAN OF CARE
Problem: Depressive Symptoms  Goal: Depressive Symptoms  Signs and symptoms of listed problems will be absent or manageable.   Outcome: No Change  Denies suicidal ideation.  She scored 2 times on the CIWA and needed 1 mg x2.  She took antabuse but stated that it made her sick and she spent a lot of time in bed. She did go to some groups.

## 2018-08-07 DIAGNOSIS — G89.4 CHRONIC PAIN SYNDROME: ICD-10-CM

## 2018-08-07 LAB
MAGNESIUM SERPL-MCNC: 1.9 MG/DL (ref 1.6–2.3)
POTASSIUM SERPL-SCNC: 4.5 MMOL/L (ref 3.4–5.3)

## 2018-08-07 PROCEDURE — 90853 GROUP PSYCHOTHERAPY: CPT

## 2018-08-07 PROCEDURE — 25000132 ZZH RX MED GY IP 250 OP 250 PS 637: Mod: GY | Performed by: NURSE PRACTITIONER

## 2018-08-07 PROCEDURE — 25000132 ZZH RX MED GY IP 250 OP 250 PS 637: Mod: GY | Performed by: PSYCHIATRY & NEUROLOGY

## 2018-08-07 PROCEDURE — 84132 ASSAY OF SERUM POTASSIUM: CPT | Performed by: INTERNAL MEDICINE

## 2018-08-07 PROCEDURE — A9270 NON-COVERED ITEM OR SERVICE: HCPCS | Mod: GY | Performed by: NURSE PRACTITIONER

## 2018-08-07 PROCEDURE — 36415 COLL VENOUS BLD VENIPUNCTURE: CPT | Performed by: INTERNAL MEDICINE

## 2018-08-07 PROCEDURE — 83735 ASSAY OF MAGNESIUM: CPT | Performed by: INTERNAL MEDICINE

## 2018-08-07 PROCEDURE — A9270 NON-COVERED ITEM OR SERVICE: HCPCS | Mod: GY | Performed by: PSYCHIATRY & NEUROLOGY

## 2018-08-07 PROCEDURE — 90791 PSYCH DIAGNOSTIC EVALUATION: CPT

## 2018-08-07 PROCEDURE — 12400006 ZZH R&B MH INTERMEDIATE

## 2018-08-07 RX ORDER — GABAPENTIN 300 MG/1
CAPSULE ORAL
Qty: 90 CAPSULE | Refills: 1 | OUTPATIENT
Start: 2018-08-07

## 2018-08-07 RX ADMIN — QUETIAPINE FUMARATE 25 MG: 25 TABLET ORAL at 22:11

## 2018-08-07 RX ADMIN — SUCRALFATE 1 G: 1 SUSPENSION ORAL at 12:06

## 2018-08-07 RX ADMIN — DISULFIRAM 250 MG: 250 TABLET ORAL at 08:50

## 2018-08-07 RX ADMIN — HYDROXYZINE HYDROCHLORIDE 25 MG: 25 TABLET ORAL at 09:00

## 2018-08-07 RX ADMIN — MULTIPLE VITAMINS W/ MINERALS TAB 1 TABLET: TAB at 08:49

## 2018-08-07 RX ADMIN — CYANOCOBALAMIN TAB 1000 MCG 1000 MCG: 1000 TAB at 08:49

## 2018-08-07 RX ADMIN — SPIRONOLACTONE 100 MG: 100 TABLET, FILM COATED ORAL at 08:50

## 2018-08-07 RX ADMIN — MIRTAZAPINE 15 MG: 15 TABLET, FILM COATED ORAL at 22:11

## 2018-08-07 RX ADMIN — Medication 400 MG: at 08:50

## 2018-08-07 RX ADMIN — GABAPENTIN 300 MG: 300 CAPSULE ORAL at 08:49

## 2018-08-07 RX ADMIN — SUCRALFATE 1 G: 1 SUSPENSION ORAL at 16:41

## 2018-08-07 RX ADMIN — GABAPENTIN 600 MG: 600 TABLET, FILM COATED ORAL at 19:52

## 2018-08-07 RX ADMIN — SUCRALFATE 1 G: 1 SUSPENSION ORAL at 08:49

## 2018-08-07 RX ADMIN — ACETAMINOPHEN 1000 MG: 500 TABLET, FILM COATED ORAL at 13:12

## 2018-08-07 RX ADMIN — HYDROXYZINE HYDROCHLORIDE 25 MG: 25 TABLET ORAL at 22:44

## 2018-08-07 RX ADMIN — FOLIC ACID 1 MG: 1 TABLET ORAL at 08:49

## 2018-08-07 RX ADMIN — HYDROXYZINE HYDROCHLORIDE 25 MG: 25 TABLET ORAL at 18:08

## 2018-08-07 RX ADMIN — SUCRALFATE 1 G: 1 SUSPENSION ORAL at 22:11

## 2018-08-07 RX ADMIN — Medication 100 MG: at 08:49

## 2018-08-07 ASSESSMENT — ACTIVITIES OF DAILY LIVING (ADL)
DRESS: STREET CLOTHES
DRESS: STREET CLOTHES
ORAL_HYGIENE: INDEPENDENT
LAUNDRY: WITH SUPERVISION
LAUNDRY: WITH SUPERVISION
ORAL_HYGIENE: INDEPENDENT
GROOMING: INDEPENDENT
GROOMING: INDEPENDENT
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
## 2018-08-07 NOTE — PROGRESS NOTES
[facility] Psychiatric Progress Note       Interim History     The patient's care was discussed with the treatment team and chart notes were reviewed. Pt seen on 77.s.  Patient is still extremely focused on being discharged Dr. Vargas confronted her and patient's explained to her the high risk of relapse.  Explained to patient that Dr. Vargas wanted collateral history from her daughter-in-law which she signed a release for this Melva Headley at [phone].  Dr. Young called and left message has not heard back.  Patient at this point high risk to relapse to continue to have her in the hospital until more services set up she agreed to have her daughter-in-law watch her take the Antabuse.  But this has not been confirmed we need to confirm this prior to discharge.     Hospital Course           Medications     Current Facility-Administered Medications Ordered in Epic   Medication Dose Route Frequency Last Rate Last Dose     acetaminophen (TYLENOL) tablet 1,000 mg  1,000 mg Oral Q8H PRN         cyanocobalamin (vitamin  B-12) tablet 1,000 mcg  1,000 mcg Oral Daily   1,000 mcg at 08/07/18 0849     diphenhydrAMINE (BENADRYL) capsule 25 mg  25 mg Oral At Bedtime PRN   25 mg at 08/05/18 2208     disulfiram (ANTABUSE) tablet 250 mg  250 mg Oral Daily   250 mg at 08/07/18 0850     folic acid (FOLVITE) tablet 1 mg  1 mg Oral Daily   1 mg at 08/07/18 0849     furosemide (LASIX) tablet 20 mg  20 mg Oral Daily PRN         gabapentin (NEURONTIN) capsule 300 mg  300 mg Oral QAM   300 mg at 08/07/18 0849     gabapentin (NEURONTIN) tablet 600 mg  600 mg Oral QPM   600 mg at 08/06/18 1954     hydrOXYzine (ATARAX) tablet 25 mg  25 mg Oral Q4H PRN   25 mg at 08/07/18 0900     LORazepam (ATIVAN) tablet 1-2 mg  1-2 mg Oral Q30 Min PRN   1 mg at 08/06/18 2157    Or     LORazepam (ATIVAN) injection 1-2 mg  1-2 mg Intravenous Q30 Min PRN         magnesium oxide (MAG-OX) tablet 400 mg  400 mg Oral Daily   400 mg  "at 08/07/18 0850     magnesium sulfate 2 g in water intermittent infusion  2 g Intravenous Daily PRN         magnesium sulfate 4 g in 100 mL sterile water (premade)  4 g Intravenous Q4H PRN         melatonin tablet 9 mg  9 mg Oral At Bedtime PRN   9 mg at 08/06/18 2152     mirtazapine (REMERON) tablet 15 mg  15 mg Oral At Bedtime   15 mg at 08/06/18 2152     multivitamin, therapeutic with minerals (THERA-VIT-M) tablet 1 tablet  1 tablet Oral Daily   1 tablet at 08/07/18 0849     polyethylene glycol (MIRALAX/GLYCOLAX) Packet 17 g  17 g Oral Daily PRN         potassium chloride (KLOR-CON) Packet 20-40 mEq  20-40 mEq Oral or Feeding Tube Q2H PRN   20 mEq at 08/04/18 1440     potassium chloride 10 mEq in 100 mL intermittent infusion with 10 mg lidocaine  10 mEq Intravenous Q1H PRN         potassium chloride 10 mEq in 100 mL sterile water intermittent infusion (premix)  10 mEq Intravenous Q1H PRN         potassium chloride 20 mEq in 50 mL intermittent infusion  20 mEq Intravenous Q1H PRN         potassium chloride SA (K-DUR/KLOR-CON M) CR tablet 20-40 mEq  20-40 mEq Oral Q2H PRN         QUEtiapine (SEROquel) tablet 25 mg  25 mg Oral At Bedtime   25 mg at 08/06/18 2152     senna-docusate (SENOKOT-S;PERICOLACE) 8.6-50 MG per tablet 1-2 tablet  1-2 tablet Oral BID PRN         spironolactone (ALDACTONE) tablet 100 mg  100 mg Oral Daily   100 mg at 08/07/18 0850     sucralfate (CARAFATE) suspension 1 g  1 g Oral 4x Daily AC & HS   1 g at 08/07/18 0849     thiamine tablet 100 mg  100 mg Oral Daily   100 mg at 08/07/18 0849     No current Epic-ordered outpatient prescriptions on file.         Allergies      Allergies   Allergen Reactions     Bactrim [Sulfamethoxazole W/Trimethoprim] Hives     Codeine Itching     NAUSEA     Morphine Itching     NAUSEA        Medical Review of Systems     /71  Pulse 83  Temp 98.4  F (36.9  C) (Oral)  Resp 16  Ht 1.626 m (5' 4\")  Wt 80 kg (176 lb 4.8 oz)  SpO2 95%  BMI 30.26 " kg/m2  Body mass index is 30.26 kg/(m^2).  A 10-point review of systems was performed by Colton Vargas MD and is negative, no new findings.      Psychiatric Examination     Appearance Sitting in chair, dressed in clothes. Appears stated age.   Attitude Cooperative   Orientation Oriented to person, place, time   Eye Contact Poor   Speech Regular rate, rhythm, volume and tone   Language Normal   Psychomotor Behavior Normal   Mood depressed   Affect depressed   Thought Process Goal-Oriented, Intact   Associations Intact   Thought Content Patient is currently negative for suicidal ideation, negative for plan or intent, able to contract no self harm and identify barriers to suicide.  Negative for obsessions, compulsions or psychosis.     Fund of Knowledge intact   Insight impaired   Judgement impaired   Attention Span & Concentration poor   Recent & Remote Memory intact   Gait Normal   Muscle Tone Intact        Labs     Labs reviewed.  No results found for this or any previous visit (from the past 24 hour(s)).     Impression     This is a 72 year old female with Major depression, recurrent, severe, without psychotic features.and Alcohol Use disorder. Failed attempts to quit and non-compliant with her meds.        Diagnoses     1. Major depression, recurrent, severe, without psychotic features.  2. Alcohol Use Disorder           Plan     1. Explained side effects, benefits, and complications of medications to the patient, Pt gave verbal consent.  2. Medication changes: Remeron 7.5mg hs seroquel 25mg hs  3. Antabuse 250mg  Pt have SO watch her take it.   4. Discussed treatment plan with patient and team.  5. Projected length of stay: 5 days      Attestation:   Patient has been seen and evaluated by me, Colton Vargas MD.    Patient ID:  Name: Kavitha Headley    MRN: [id number]  Admission: 8/3/2018   YOB: 1945

## 2018-08-07 NOTE — TELEPHONE ENCOUNTER
Gabapentin 300mg      Last Written Prescription Date:  6/14/18  Last Fill Quantity: 90,   # refills: 1  Last Office Visit: 6/14/18  Future Office visit:       Routing refill request to provider for review/approval because:  Drug not on the [facility], [facility] or [facility] refill protocol or controlled substance    Michelle Vargas CMA

## 2018-08-07 NOTE — PROGRESS NOTES
[facility] Psychiatric Progress Note       Interim History     The patient's care was discussed with the treatment team and chart notes were reviewed. Pt seen on 77. Pt went to extended care at [facility]isted living in [place].    Pt slept much better with Remeron and Seroquel. Pt feeling much more rested and less thirsty for alcohol. Pt took Antabuse despite a great deal of trying to find excuses not to. No side effects. Pt still wants out. Her CIWA score is decreaing. Yesterday she was still in withdrawal.  Pt attends AA in [place] in [place] and she attends a speaker group in [place]. Pt has good intentions. Longest sober period for 3 years More than 10 years. Pt has a niece who has offered to help her. Pt will sign a release to speak wit her.      Hospital Course           Medications     Current Facility-Administered Medications Ordered in Epic   Medication Dose Route Frequency Last Rate Last Dose     acetaminophen (TYLENOL) tablet 1,000 mg  1,000 mg Oral Q8H PRN         cyanocobalamin (vitamin  B-12) tablet 1,000 mcg  1,000 mcg Oral Daily   1,000 mcg at 08/07/18 0849     diphenhydrAMINE (BENADRYL) capsule 25 mg  25 mg Oral At Bedtime PRN   25 mg at 08/05/18 2208     disulfiram (ANTABUSE) tablet 250 mg  250 mg Oral Daily   250 mg at 08/07/18 0850     folic acid (FOLVITE) tablet 1 mg  1 mg Oral Daily   1 mg at 08/07/18 0849     furosemide (LASIX) tablet 20 mg  20 mg Oral Daily PRN         gabapentin (NEURONTIN) capsule 300 mg  300 mg Oral QAM   300 mg at 08/07/18 0849     gabapentin (NEURONTIN) tablet 600 mg  600 mg Oral QPM   600 mg at 08/06/18 1954     hydrOXYzine (ATARAX) tablet 25 mg  25 mg Oral Q4H PRN   25 mg at 08/07/18 0900     LORazepam (ATIVAN) tablet 1-2 mg  1-2 mg Oral Q30 Min PRN   1 mg at 08/06/18 2157    Or     LORazepam (ATIVAN) injection 1-2 mg  1-2 mg Intravenous Q30 Min PRN         magnesium oxide (MAG-OX) tablet 400 mg  400 mg Oral Daily   400 mg at 08/07/18 0850     magnesium  "sulfate 2 g in water intermittent infusion  2 g Intravenous Daily PRN         magnesium sulfate 4 g in 100 mL sterile water (premade)  4 g Intravenous Q4H PRN         melatonin tablet 9 mg  9 mg Oral At Bedtime PRN   9 mg at 08/06/18 2152     mirtazapine (REMERON) tablet 15 mg  15 mg Oral At Bedtime   15 mg at 08/06/18 2152     multivitamin, therapeutic with minerals (THERA-VIT-M) tablet 1 tablet  1 tablet Oral Daily   1 tablet at 08/07/18 0849     polyethylene glycol (MIRALAX/GLYCOLAX) Packet 17 g  17 g Oral Daily PRN         potassium chloride (KLOR-CON) Packet 20-40 mEq  20-40 mEq Oral or Feeding Tube Q2H PRN   20 mEq at 08/04/18 1440     potassium chloride 10 mEq in 100 mL intermittent infusion with 10 mg lidocaine  10 mEq Intravenous Q1H PRN         potassium chloride 10 mEq in 100 mL sterile water intermittent infusion (premix)  10 mEq Intravenous Q1H PRN         potassium chloride 20 mEq in 50 mL intermittent infusion  20 mEq Intravenous Q1H PRN         potassium chloride SA (K-DUR/KLOR-CON M) CR tablet 20-40 mEq  20-40 mEq Oral Q2H PRN         QUEtiapine (SEROquel) tablet 25 mg  25 mg Oral At Bedtime   25 mg at 08/06/18 2152     senna-docusate (SENOKOT-S;PERICOLACE) 8.6-50 MG per tablet 1-2 tablet  1-2 tablet Oral BID PRN         spironolactone (ALDACTONE) tablet 100 mg  100 mg Oral Daily   100 mg at 08/07/18 0850     sucralfate (CARAFATE) suspension 1 g  1 g Oral 4x Daily AC & HS   1 g at 08/07/18 0849     thiamine tablet 100 mg  100 mg Oral Daily   100 mg at 08/07/18 0849     No current Epic-ordered outpatient prescriptions on file.         Allergies      Allergies   Allergen Reactions     Bactrim [Sulfamethoxazole W/Trimethoprim] Hives     Codeine Itching     NAUSEA     Morphine Itching     NAUSEA        Medical Review of Systems     /71  Pulse 83  Temp 98.4  F (36.9  C) (Oral)  Resp 16  Ht 1.626 m (5' 4\")  Wt 80 kg (176 lb 4.8 oz)  SpO2 95%  BMI 30.26 kg/m2  Body mass index is 30.26 " kg/(m^2).  A 10-point review of systems was performed by Colton Vargas MD and is negative, no new findings.      Psychiatric Examination     Appearance Sitting in chair, dressed in clothes. Appears stated age.   Attitude Cooperative   Orientation Oriented to person, place, time   Eye Contact Poor   Speech Regular rate, rhythm, volume and tone   Language Normal   Psychomotor Behavior Normal   Mood depressed   Affect depressed   Thought Process Goal-Oriented, Intact   Associations Intact   Thought Content Patient is currently negative for suicidal ideation, negative for plan or intent, able to contract no self harm and identify barriers to suicide.  Negative for obsessions, compulsions or psychosis.     Fund of Knowledge intact   Insight impaired   Judgement impaired   Attention Span & Concentration poor   Recent & Remote Memory intact   Gait Normal   Muscle Tone Intact        Labs     Labs reviewed.  No results found for this or any previous visit (from the past 24 hour(s)).     Impression     This is a 72 year old female with Major depression, recurrent, severe, without psychotic features.and Alcohol Use disorder. Failed attempts to quit and non-compliant with her meds.        Diagnoses     1. Major depression, recurrent, severe, without psychotic features.  2. Alcohol Use Disorder           Plan     1. Explained side effects, benefits, and complications of medications to the patient, Pt gave verbal consent.  2. Medication changes: Remeron 7.5mg hs seroquel 25mg hs  3. Antabuse 250mg  Pt have SO watch her take it.   4. Discussed treatment plan with patient and team.  5. Projected length of stay: 1 more day.  6. Pt to sign release to speak with her daughter-in-law Melva Headley who works in the in mental health.       Attestation:   Patient has been seen and evaluated by me, Colton Vargas MD.    Patient ID:  Name: Kavitha Headley    MRN: [id number]  Admission: 8/3/2018   YOB: 1945

## 2018-08-07 NOTE — PLAN OF CARE
Problem: Depressive Symptoms  Goal: Depressive Symptoms  Signs and symptoms of listed problems will be absent or manageable.   Outcome: No Change  Patient is compliant with unit routines. Scores 1/2 on CIWA. 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 Pt shows good insight in groups

## 2018-08-07 NOTE — PLAN OF CARE
Problem: Depressive Symptoms  Goal: Depressive Symptoms  Signs and symptoms of listed problems will be absent or manageable.   Outcome: Improving  Pt denies suicidal ideation. Did not receive ativan this shift for CIWA. Ate dinner, visible in lounge. Attended wrap-up group. Appears less tremulous than yesterday. Still some tremor noted. During 1:1 Pt said she feels claustrophobic at times in her room. Staff walked her down to the lounge where she was more comfortable watching some TV and chatting with staff. Requested PRN Vistaril instead of Ativan for anxiety earlier in shift & obtained relief.

## 2018-08-08 LAB
MAGNESIUM SERPL-MCNC: 1.7 MG/DL (ref 1.6–2.3)
POTASSIUM SERPL-SCNC: 4.2 MMOL/L (ref 3.4–5.3)

## 2018-08-08 PROCEDURE — 12400006 ZZH R&B MH INTERMEDIATE

## 2018-08-08 PROCEDURE — 36415 COLL VENOUS BLD VENIPUNCTURE: CPT | Performed by: INTERNAL MEDICINE

## 2018-08-08 PROCEDURE — A9270 NON-COVERED ITEM OR SERVICE: HCPCS | Mod: GY | Performed by: PSYCHIATRY & NEUROLOGY

## 2018-08-08 PROCEDURE — 83735 ASSAY OF MAGNESIUM: CPT | Performed by: INTERNAL MEDICINE

## 2018-08-08 PROCEDURE — A9270 NON-COVERED ITEM OR SERVICE: HCPCS | Mod: GY | Performed by: NURSE PRACTITIONER

## 2018-08-08 PROCEDURE — 84132 ASSAY OF SERUM POTASSIUM: CPT | Performed by: INTERNAL MEDICINE

## 2018-08-08 PROCEDURE — 90853 GROUP PSYCHOTHERAPY: CPT

## 2018-08-08 PROCEDURE — 25000132 ZZH RX MED GY IP 250 OP 250 PS 637: Mod: GY | Performed by: PSYCHIATRY & NEUROLOGY

## 2018-08-08 PROCEDURE — 25000132 ZZH RX MED GY IP 250 OP 250 PS 637: Mod: GY | Performed by: NURSE PRACTITIONER

## 2018-08-08 RX ADMIN — Medication 400 MG: at 08:28

## 2018-08-08 RX ADMIN — SPIRONOLACTONE 100 MG: 100 TABLET, FILM COATED ORAL at 08:29

## 2018-08-08 RX ADMIN — DISULFIRAM 250 MG: 250 TABLET ORAL at 08:29

## 2018-08-08 RX ADMIN — MULTIPLE VITAMINS W/ MINERALS TAB 1 TABLET: TAB at 08:29

## 2018-08-08 RX ADMIN — HYDROXYZINE HYDROCHLORIDE 25 MG: 25 TABLET ORAL at 22:32

## 2018-08-08 RX ADMIN — POLYETHYLENE GLYCOL 3350 17 G: 17 POWDER, FOR SOLUTION ORAL at 09:48

## 2018-08-08 RX ADMIN — SENNOSIDES AND DOCUSATE SODIUM 2 TABLET: 8.6; 5 TABLET ORAL at 09:48

## 2018-08-08 RX ADMIN — HYDROXYZINE HYDROCHLORIDE 25 MG: 25 TABLET ORAL at 16:05

## 2018-08-08 RX ADMIN — GABAPENTIN 300 MG: 300 CAPSULE ORAL at 08:28

## 2018-08-08 RX ADMIN — SUCRALFATE 1 G: 1 SUSPENSION ORAL at 08:29

## 2018-08-08 RX ADMIN — GABAPENTIN 600 MG: 600 TABLET, FILM COATED ORAL at 21:28

## 2018-08-08 RX ADMIN — ACETAMINOPHEN 1000 MG: 500 TABLET, FILM COATED ORAL at 11:52

## 2018-08-08 RX ADMIN — MIRTAZAPINE 15 MG: 15 TABLET, FILM COATED ORAL at 21:28

## 2018-08-08 RX ADMIN — HYDROXYZINE HYDROCHLORIDE 25 MG: 25 TABLET ORAL at 04:12

## 2018-08-08 RX ADMIN — HYDROXYZINE HYDROCHLORIDE 25 MG: 25 TABLET ORAL at 11:52

## 2018-08-08 RX ADMIN — SUCRALFATE 1 G: 1 SUSPENSION ORAL at 21:28

## 2018-08-08 RX ADMIN — SUCRALFATE 1 G: 1 SUSPENSION ORAL at 11:51

## 2018-08-08 RX ADMIN — Medication 100 MG: at 08:29

## 2018-08-08 RX ADMIN — FOLIC ACID 1 MG: 1 TABLET ORAL at 08:29

## 2018-08-08 RX ADMIN — DIPHENHYDRAMINE HYDROCHLORIDE 25 MG: 25 CAPSULE ORAL at 00:50

## 2018-08-08 RX ADMIN — SUCRALFATE 1 G: 1 SUSPENSION ORAL at 17:10

## 2018-08-08 RX ADMIN — CYANOCOBALAMIN TAB 1000 MCG 1000 MCG: 1000 TAB at 08:28

## 2018-08-08 RX ADMIN — QUETIAPINE FUMARATE 25 MG: 25 TABLET ORAL at 21:28

## 2018-08-08 ASSESSMENT — ACTIVITIES OF DAILY LIVING (ADL)
DRESS: STREET CLOTHES
ORAL_HYGIENE: INDEPENDENT
GROOMING: INDEPENDENT
LAUNDRY: WITH SUPERVISION

## 2018-08-08 NOTE — PLAN OF CARE
Problem: Depressive Symptoms  Goal: Depressive Symptoms  Signs and symptoms of listed problems will be absent or manageable.   Outcome: No Change  Patient c/o back pain. Meds given with some relief. Hot pack then given. Pt anxious,flat . Went to groups.

## 2018-08-08 NOTE — PROGRESS NOTES
Brief Hospitalist Note:  Patient on potassium and magnesium protocol from admission. Potassium 8/7/2018 4.5, magnesium 1.9. Receiving magnesium PO daily.    Assessment/Plan:  Hypokalemia secondary to heavy alcohol use  Hypomagnesemia secondary to heavy alcohol use  - with K 4.5 yesterday and no further alcohol use it is reasonable to discontinue Potassium Protoco  - continue daily PO magnesium     ARISTEO De La Rosa, CNP  Hospitalist Service, House Officer  [facility]     Text Page  Pager: [phone]

## 2018-08-08 NOTE — PLAN OF CARE
Problem: Depressive Symptoms  Goal: Depressive Symptoms  Signs and symptoms of listed problems will be absent or manageable.   Outcome: No Change  Pleasant and cooperative. Visible on the unit but withdrawn.  Reports that she is sleeping better.  Denies HA or any current physical complaints.  She is hoping to discharge soon and is looking forward to discussing this with her MD tomorrow. Pt plans to return to 3 AA meetings that she used to attended and reports that her daughter in-law Melva Headley (#[phone]) will watch her take her antabuse. Became tearful when discussing her current situation and feels disappointed in herself.  Hopeful that things will get better.  Denied SI.  Attended wrap up group.  Scored a 1 & 3 on CIWA.

## 2018-08-09 VITALS
RESPIRATION RATE: 16 BRPM | BODY MASS INDEX: 30.1 KG/M2 | TEMPERATURE: 97.6 F | HEART RATE: 90 BPM | SYSTOLIC BLOOD PRESSURE: 126 MMHG | HEIGHT: 64 IN | DIASTOLIC BLOOD PRESSURE: 90 MMHG | WEIGHT: 176.3 LBS | OXYGEN SATURATION: 95 %

## 2018-08-09 PROCEDURE — 25000132 ZZH RX MED GY IP 250 OP 250 PS 637: Mod: GY | Performed by: NURSE PRACTITIONER

## 2018-08-09 PROCEDURE — 25000132 ZZH RX MED GY IP 250 OP 250 PS 637: Mod: GY | Performed by: PSYCHIATRY & NEUROLOGY

## 2018-08-09 PROCEDURE — A9270 NON-COVERED ITEM OR SERVICE: HCPCS | Mod: GY | Performed by: PSYCHIATRY & NEUROLOGY

## 2018-08-09 PROCEDURE — A9270 NON-COVERED ITEM OR SERVICE: HCPCS | Mod: GY | Performed by: NURSE PRACTITIONER

## 2018-08-09 RX ORDER — DISULFIRAM 250 MG/1
250 TABLET ORAL DAILY
Qty: 30 TABLET | Refills: 0 | Status: SHIPPED | OUTPATIENT
Start: 2018-08-10 | End: 2018-08-28

## 2018-08-09 RX ORDER — HYDROXYZINE HYDROCHLORIDE 25 MG/1
25 TABLET, FILM COATED ORAL EVERY 4 HOURS PRN
Qty: 120 TABLET | Refills: 0 | Status: SHIPPED | OUTPATIENT
Start: 2018-08-09 | End: 2018-08-28 | Stop reason: SINTOL

## 2018-08-09 RX ADMIN — Medication 400 MG: at 08:33

## 2018-08-09 RX ADMIN — SPIRONOLACTONE 100 MG: 100 TABLET, FILM COATED ORAL at 08:42

## 2018-08-09 RX ADMIN — HYDROXYZINE HYDROCHLORIDE 25 MG: 25 TABLET ORAL at 03:57

## 2018-08-09 RX ADMIN — HYDROXYZINE HYDROCHLORIDE 25 MG: 25 TABLET ORAL at 12:48

## 2018-08-09 RX ADMIN — FOLIC ACID 1 MG: 1 TABLET ORAL at 08:33

## 2018-08-09 RX ADMIN — MULTIPLE VITAMINS W/ MINERALS TAB 1 TABLET: TAB at 08:33

## 2018-08-09 RX ADMIN — SUCRALFATE 1 G: 1 SUSPENSION ORAL at 08:33

## 2018-08-09 RX ADMIN — SUCRALFATE 1 G: 1 SUSPENSION ORAL at 12:48

## 2018-08-09 RX ADMIN — GABAPENTIN 300 MG: 300 CAPSULE ORAL at 08:33

## 2018-08-09 RX ADMIN — CYANOCOBALAMIN TAB 1000 MCG 1000 MCG: 1000 TAB at 08:33

## 2018-08-09 RX ADMIN — MELATONIN TAB 3 MG 9 MG: 3 TAB at 00:14

## 2018-08-09 RX ADMIN — ACETAMINOPHEN 1000 MG: 500 TABLET, FILM COATED ORAL at 00:26

## 2018-08-09 RX ADMIN — DIPHENHYDRAMINE HYDROCHLORIDE 25 MG: 25 CAPSULE ORAL at 00:14

## 2018-08-09 RX ADMIN — DISULFIRAM 250 MG: 250 TABLET ORAL at 08:33

## 2018-08-09 RX ADMIN — HYDROXYZINE HYDROCHLORIDE 25 MG: 25 TABLET ORAL at 08:38

## 2018-08-09 RX ADMIN — Medication 100 MG: at 08:33

## 2018-08-09 NOTE — PROGRESS NOTES
met with patient and had her sign a release of information form for [facility] in [place] for follow up psychiatry and therapy.  also reviewed safety plan with patient and answered any questions that she had. Patient denies any thoughts of suicide or self harm at this time.  called [facility] to set up appointments for patient, but was told that for initial appointments patient would need to make the call herself.  discussed this with patient and she was fine with setting up her own appointments.

## 2018-08-09 NOTE — PROGRESS NOTES
[facility] Psychiatric Progress Note       Interim History     The patient's care was discussed with the treatment team and chart notes were reviewed. Pt seen on 77.s.  Pt signed 1 12 hour letter of intent to leave she called her significant other and told him      Hospital Course           Medications     Current Facility-Administered Medications Ordered in Epic   Medication Dose Route Frequency Last Rate Last Dose     acetaminophen (TYLENOL) tablet 1,000 mg  1,000 mg Oral Q8H PRN   1,000 mg at 08/09/18 0026     cyanocobalamin (vitamin  B-12) tablet 1,000 mcg  1,000 mcg Oral Daily   1,000 mcg at 08/09/18 0833     diphenhydrAMINE (BENADRYL) capsule 25 mg  25 mg Oral At Bedtime PRN   25 mg at 08/09/18 0014     disulfiram (ANTABUSE) tablet 250 mg  250 mg Oral Daily   250 mg at 08/09/18 0833     folic acid (FOLVITE) tablet 1 mg  1 mg Oral Daily   1 mg at 08/09/18 0833     furosemide (LASIX) tablet 20 mg  20 mg Oral Daily PRN         gabapentin (NEURONTIN) capsule 300 mg  300 mg Oral QAM   300 mg at 08/09/18 0833     gabapentin (NEURONTIN) tablet 600 mg  600 mg Oral QPM   600 mg at 08/08/18 2128     hydrOXYzine (ATARAX) tablet 25 mg  25 mg Oral Q4H PRN   25 mg at 08/09/18 1248     magnesium oxide (MAG-OX) tablet 400 mg  400 mg Oral Daily   400 mg at 08/09/18 0833     melatonin tablet 9 mg  9 mg Oral At Bedtime PRN   9 mg at 08/09/18 0014     mirtazapine (REMERON) tablet 15 mg  15 mg Oral At Bedtime   15 mg at 08/08/18 2128     multivitamin, therapeutic with minerals (THERA-VIT-M) tablet 1 tablet  1 tablet Oral Daily   1 tablet at 08/09/18 0833     polyethylene glycol (MIRALAX/GLYCOLAX) Packet 17 g  17 g Oral Daily PRN   17 g at 08/08/18 0948     QUEtiapine (SEROquel) tablet 25 mg  25 mg Oral At Bedtime   25 mg at 08/08/18 2128     senna-docusate (SENOKOT-S;PERICOLACE) 8.6-50 MG per tablet 1-2 tablet  1-2 tablet Oral BID PRN   2 tablet at 08/08/18 0929     spironolactone (ALDACTONE) tablet 100 mg  100 mg  "Oral Daily   100 mg at 08/09/18 0842     sucralfate (CARAFATE) suspension 1 g  1 g Oral 4x Daily AC & HS   1 g at 08/09/18 1248     thiamine tablet 100 mg  100 mg Oral Daily   100 mg at 08/09/18 0833     Current Outpatient Prescriptions Ordered in Epic   Medication     [START ON 8/10/2018] disulfiram (ANTABUSE) 250 MG tablet     hydrOXYzine (ATARAX) 25 MG tablet         Allergies      Allergies   Allergen Reactions     Bactrim [Sulfamethoxazole W/Trimethoprim] Hives     Codeine Itching     NAUSEA     Morphine Itching     NAUSEA        Medical Review of Systems     /90  Pulse 90  Temp 97.6  F (36.4  C) (Oral)  Resp 16  Ht 1.626 m (5' 4\")  Wt 80 kg (176 lb 4.8 oz)  SpO2 95%  BMI 30.26 kg/m2  Body mass index is 30.26 kg/(m^2).  A 10-point review of systems was performed by Colton Vargas MD and is negative, no new findings.      Psychiatric Examination     Appearance Sitting in chair, dressed in clothes. Appears stated age.   Attitude Cooperative   Orientation Oriented to person, place, time   Eye Contact Poor   Speech Regular rate, rhythm, volume and tone   Language Normal   Psychomotor Behavior Normal   Mood depressed   Affect depressed   Thought Process Goal-Oriented, Intact   Associations Intact   Thought Content Patient is currently negative for suicidal ideation, negative for plan or intent, able to contract no self harm and identify barriers to suicide.  Negative for obsessions, compulsions or psychosis.     Fund of Knowledge intact   Insight impaired   Judgement impaired   Attention Span & Concentration poor   Recent & Remote Memory intact   Gait Normal   Muscle Tone Intact        Labs     Labs reviewed.  No results found for this or any previous visit (from the past 24 hour(s)).     Impression     This is a 72 year old female with Major depression, recurrent, severe, without psychotic features.and Alcohol Use disorder. Failed attempts to quit and non-compliant with her meds.        " Diagnoses     1. Major depression, recurrent, severe, without psychotic features.  2. Alcohol Use Disorder           Plan     1. Explained side effects, benefits, and complications of medications to the patient, Pt gave verbal consent.  2. Medication changes: Remeron 7.5mg hs seroquel 25mg hs  3. Antabuse 250mg  Pt have SO watch her take it.   4. Discussed treatment plan with patient and team.  5. Projected length of stay: 5 days      Attestation:   Patient has been seen and evaluated by me, Colton Vargas MD.    Patient ID:  Name: Kavitha Headley    MRN: [id number]  Admission: 8/3/2018   YOB: 1945

## 2018-08-09 NOTE — PLAN OF CARE
"Problem: Depressive Symptoms  Goal: Depressive Symptoms  Signs and symptoms of listed problems will be absent or manageable.   Pt presents as: withdrawn, minimally social with peers, calm in mood, bright upon approach. Pt's plan was to go home tomorrow, but due to the loud/busy evening, she wanted to leave tonight. She said, \"There is no reason for me to be here... I'm not going to get any better in one night\". She agreed to speak with the doctor tonight. At 2230, writer told pt that he would not be coming becuse he could not get a hold of her daughter in-law (she is supposed to watch her take antabuse). Pt was upset, but calm and polite. Pt c/o back pain and not being able to relax on the unit. She states that she will stick to the original plan, which is to get picked up by her fiance at 12:20 pm tomorrow.       "

## 2018-08-09 NOTE — DISCHARGE INSTRUCTIONS
Behavioral Discharge Planning and Instructions    Summary:  Admitted for worsening depression and alcohol abuse.     Main Diagnosis:  Major Depression, recurrent, severe, without psychotic features; Alcohol Use Disorder    Major Treatments, Procedures and Findings:  Psychiatric assessment. Medication adjustment.     Symptoms to Report: Increased confusion, Losing more sleep or sleeping too much, Mood getting worse or Thoughts of suicide    Lifestyle Adjustment:  Follow all treatment recommendations. Develop and follow safety plan.  Abstain from the use of all mood-altering substances, including alcohol, as usage may increase symptoms of depression. Maintain sobriety by attending daily AA or NA meetings and obtaining a sponsor.     Psychiatry Follow-up:     Please call as soon as possible following discharge to set up follow up psychiatry and therapy appointments at [facility]. You will need to be seen within 30 days in order to get your medications refilled.     [address] [address]  [phone] / Fax: [phone]     If you decide that you would like to do a chemical dependency assessment in the future, you are encouraged to call [facility] to set up an appointment. They would be able to help you determine what resources are available to you to assist you in maintaining sobriety.  They are licensed to do chemical dependency assessments for persons who have Medicare.     [facility] / [address] [address]  [phone]    Resources:   Crisis Intervention: 680.582.4492 or 285-671-1413 (TTY: 538.789.7467).  Call anytime for help.  National [place] on Mental Illness (www.mn.joya.org): 281.396.1004 or 065-234-7736.  Alcoholics Anonymous (www.alcoholics-anonymous.org): Check your phone book for your local chapter.  National Suicide Prevention Line  (www.mentalhealthmn.org): 012-204-OAQV (8255)  [facility] Crisis Line - 24/7 mobile and telephone crisis response services in [facility]. One central access number: 162.741.5349 for all services.    General Medication Instructions:   See your medication sheet(s) for instructions.   Take all medicines as directed.  Make no changes unless your doctor suggests them.   Go to all your doctor visits.  Be sure to have all your required lab tests. This way, your medicines can be refilled on time.  Do not use any drugs not prescribed by your doctor.  Avoid alcohol.

## 2018-08-09 NOTE — PROGRESS NOTES
Discharge teaching done. Pt denied SI, Pt verbalized understanding of medication and out pt f/u TX plan. Belongings returned to pt at discharge. Pt's significant other is out side the unit door and is awaiting pt's discharge and will provide pt with transportation home. Pt plans to go to Dr. Vargas's office for samples of Trintellix. Pt discharged to home.

## 2018-08-09 NOTE — PROGRESS NOTES
"Pt demanding to discharge. Pt stated , \"I'm leaving at 1 o'clock!\"  Pt signed a 12 hour intent to leave.  Awaiting Dr. Vargas to arrive to the unit. Pt's SO arrived and refuses to leave the locked entry and continues to stare at staff.  Dr. Vargas arrived and talked with both pt and SO. Awaiting orders.  "

## 2018-08-10 ENCOUNTER — TELEPHONE (OUTPATIENT)
Dept: FAMILY MEDICINE | Facility: CLINIC | Age: 73
End: 2018-08-10

## 2018-08-10 NOTE — TELEPHONE ENCOUNTER
Called [phone]    ED / Discharge Outreach Protocol    Patient Contact    Attempt # 1    Was call answered?  No.  Unable to leave message. (mailbox is full)      Sara Williamson RN  [facility]

## 2018-08-10 NOTE — TELEPHONE ENCOUNTER
Patient discharged from [facility] for inpatient hospital stay on 8/9 for depression, anxiety.    Please contact patient to follow up; no appointment scheduled at this time.    ER / IP:  0/1    Care Coordination:  yeison Willis

## 2018-08-13 NOTE — TELEPHONE ENCOUNTER
ED / Discharge Outreach Protocol    Patient Contact    Attempt # 2    Was call answered?  No.  Unable to leave message. (mailbox is full)      Sara Williamson RN  [facility]

## 2018-08-14 DIAGNOSIS — G89.4 CHRONIC PAIN SYNDROME: ICD-10-CM

## 2018-08-14 NOTE — TELEPHONE ENCOUNTER
gabapentin (NEURONTIN) 300 MG capsule        Last Written Prescription Date:  6.14.18  Last Fill Quantity: 90,  # refills: 1   Last Office Visit: 6/18/2018   Future Office Visit:     :       Routing refill request to provider for review/approval because:  Drug not on the [facility], [facility] or [facility] refill protocol or controlled substance      Controlled substance agreement signed

## 2018-08-15 PROBLEM — I47.10 PAROXYSMAL SUPRAVENTRICULAR TACHYCARDIA (H): Status: ACTIVE | Noted: 2018-08-15

## 2018-08-15 PROBLEM — I77.819 AORTIC DILATATION (H): Status: ACTIVE | Noted: 2018-08-15

## 2018-08-15 PROBLEM — D69.6 THROMBOCYTOPENIA (H): Status: ACTIVE | Noted: 2018-08-15

## 2018-08-15 PROBLEM — K70.9 LIVER DISEASE, CHRONIC, DUE TO ALCOHOL (H): Status: ACTIVE | Noted: 2018-08-15

## 2018-08-15 NOTE — TELEPHONE ENCOUNTER
Routing refill request to provider for review/approval because:  Drug not on the [facility] refill protocol   Merlene Ibarra RN  [place] [facility]

## 2018-08-15 NOTE — TELEPHONE ENCOUNTER
Last filled by Dr. Clayton.  Pt has no-showed me and I would defer to her .    Please advise pt that she is due for hospital f/u appt.      Nayeli Castorena MS, PA-C

## 2018-08-15 NOTE — TELEPHONE ENCOUNTER
Attempt #3    Mailbox full. Unable to LM. Final attempt.    Merlene Ibarra RN  [facility]

## 2018-08-16 RX ORDER — GABAPENTIN 300 MG/1
300 CAPSULE ORAL 3 TIMES DAILY
Qty: 90 CAPSULE | Refills: 1 | Status: SHIPPED | OUTPATIENT
Start: 2018-08-16 | End: 2018-10-10

## 2018-08-21 DIAGNOSIS — I10 BENIGN HYPERTENSION: ICD-10-CM

## 2018-08-21 NOTE — TELEPHONE ENCOUNTER
"Requested Prescriptions   Pending Prescriptions Disp Refills     furosemide (LASIX) 20 MG tablet [Pharmacy Med Name: FUROSEMIDE 20 MG TABLET] 90 tablet 0        Last Written Prescription Date:  5.25.18  Last Fill Quantity: 90,  # refills: 0   Last Office Visit: 6/18/2018   Future Office Visit:      Sig: TAKE 1 TABLET BY MOUTH EVERY DAY    Diuretics (Including Combos) Protocol Failed    8/21/2018  7:26 AM       Failed - Blood pressure under 140/90 in past 12 months    BP Readings from Last 3 Encounters:   08/09/18 126/90   06/18/18 118/62   06/14/18 144/88                Passed - Recent (12 mo) or future (30 days) visit within the authorizing provider's specialty    Patient had office visit in the last 12 months or has a visit in the next 30 days with authorizing provider or within the authorizing provider's specialty.  See \"Patient Info\" tab in inbasket, or \"Choose Columns\" in Meds & Orders section of the refill encounter.           Passed - Patient is age 18 or older       Passed - No active pregancy on record       Passed - Normal serum creatinine on file in past 12 months    Recent Labs   Lab Test  08/04/18   0900   CR  0.72             Passed - Normal serum potassium on file in past 12 months    Recent Labs   Lab Test  08/08/18   1005   POTASSIUM  4.2                   Passed - Normal serum sodium on file in past 12 months    Recent Labs   Lab Test  08/04/18   0900   NA  141             Passed - No positive pregnancy test in past 12 months          "

## 2018-08-21 NOTE — TELEPHONE ENCOUNTER
"Requested Prescriptions   Pending Prescriptions Disp Refills     furosemide (LASIX) 20 MG tablet [Pharmacy Med Name: FUROSEMIDE 20 MG TABLET]  Last Written Prescription Date:  5/25/18  Last Fill Quantity: 90,  # refills: 0   Last office visit: 6/18/2018 with prescribing provider:  Matilde   Future Office Visit:     90 tablet 0     Sig: TAKE 1 TABLET BY MOUTH EVERY DAY    Diuretics (Including Combos) Protocol Failed    8/21/2018  7:20 AM       Failed - Blood pressure under 140/90 in past 12 months    BP Readings from Last 3 Encounters:   08/09/18 126/90   06/18/18 118/62   06/14/18 144/88                Passed - Recent (12 mo) or future (30 days) visit within the authorizing provider's specialty    Patient had office visit in the last 12 months or has a visit in the next 30 days with authorizing provider or within the authorizing provider's specialty.  See \"Patient Info\" tab in inbasket, or \"Choose Columns\" in Meds & Orders section of the refill encounter.           Passed - Patient is age 18 or older       Passed - No active pregancy on record       Passed - Normal serum creatinine on file in past 12 months    Recent Labs   Lab Test  08/04/18   0900   CR  0.72             Passed - Normal serum potassium on file in past 12 months    Recent Labs   Lab Test  08/08/18   1005   POTASSIUM  4.2                   Passed - Normal serum sodium on file in past 12 months    Recent Labs   Lab Test  08/04/18   0900   NA  141             Passed - No positive pregnancy test in past 12 months          "

## 2018-08-21 NOTE — TELEPHONE ENCOUNTER
Per LP, ok to fill for only 15 days. Pt has had no shows, and needs an OV for evaluation to get further medications.    Unable to LM. Mailbox full.    Merlene Ibarra RN  [facility]

## 2018-08-23 RX ORDER — FUROSEMIDE 20 MG
TABLET ORAL
Qty: 15 TABLET | Refills: 0 | Status: SHIPPED | OUTPATIENT
Start: 2018-08-23 | End: 2018-08-28

## 2018-08-23 NOTE — TELEPHONE ENCOUNTER
Attempt #2  Called [phone] - Left a non-detailed message to call back.    If patient calls back, please schedule a FASTING PHYSICAL or MED CHECK within 15 days and route back to RN team.       Sara Williamson RN  [facility]

## 2018-08-28 ENCOUNTER — TELEPHONE (OUTPATIENT)
Dept: FAMILY MEDICINE | Facility: CLINIC | Age: 73
End: 2018-08-28

## 2018-08-28 ENCOUNTER — OFFICE VISIT (OUTPATIENT)
Dept: FAMILY MEDICINE | Facility: CLINIC | Age: 73
End: 2018-08-28
Payer: COMMERCIAL

## 2018-08-28 VITALS
DIASTOLIC BLOOD PRESSURE: 83 MMHG | HEIGHT: 64 IN | WEIGHT: 172 LBS | TEMPERATURE: 98.5 F | SYSTOLIC BLOOD PRESSURE: 126 MMHG | OXYGEN SATURATION: 97 % | HEART RATE: 99 BPM | BODY MASS INDEX: 29.37 KG/M2

## 2018-08-28 DIAGNOSIS — G47.00 INSOMNIA, UNSPECIFIED TYPE: ICD-10-CM

## 2018-08-28 DIAGNOSIS — K20.90 ESOPHAGITIS: Primary | ICD-10-CM

## 2018-08-28 DIAGNOSIS — R59.0 INGUINAL LYMPHADENOPATHY: ICD-10-CM

## 2018-08-28 DIAGNOSIS — I47.10 PAROXYSMAL SUPRAVENTRICULAR TACHYCARDIA (H): ICD-10-CM

## 2018-08-28 DIAGNOSIS — I10 BENIGN HYPERTENSION: ICD-10-CM

## 2018-08-28 DIAGNOSIS — I77.819 AORTIC DILATATION (H): ICD-10-CM

## 2018-08-28 DIAGNOSIS — F32.A DEPRESSION, UNSPECIFIED DEPRESSION TYPE: ICD-10-CM

## 2018-08-28 DIAGNOSIS — N64.4 BREAST PAIN, RIGHT: ICD-10-CM

## 2018-08-28 DIAGNOSIS — K70.9 LIVER DISEASE, CHRONIC, DUE TO ALCOHOL (H): ICD-10-CM

## 2018-08-28 DIAGNOSIS — F41.9 ANXIETY: ICD-10-CM

## 2018-08-28 DIAGNOSIS — D69.6 THROMBOCYTOPENIA (H): ICD-10-CM

## 2018-08-28 PROCEDURE — 99215 OFFICE O/P EST HI 40 MIN: CPT | Performed by: PHYSICIAN ASSISTANT

## 2018-08-28 RX ORDER — SPIRONOLACTONE 100 MG/1
100 TABLET, FILM COATED ORAL DAILY
Qty: 90 TABLET | Refills: 1 | Status: SHIPPED | OUTPATIENT
Start: 2018-08-28 | End: 2019-03-01

## 2018-08-28 RX ORDER — SUCRALFATE ORAL 1 G/10ML
1 SUSPENSION ORAL
Qty: 1200 ML | Refills: 1 | Status: SHIPPED | OUTPATIENT
Start: 2018-08-28 | End: 2019-12-04

## 2018-08-28 RX ORDER — TRAZODONE HYDROCHLORIDE 100 MG/1
TABLET ORAL
Qty: 90 TABLET | Refills: 1 | Status: SHIPPED | OUTPATIENT
Start: 2018-08-28 | End: 2019-06-26

## 2018-08-28 RX ORDER — FUROSEMIDE 20 MG
20 TABLET ORAL DAILY
Qty: 90 TABLET | Refills: 0 | Status: SHIPPED | OUTPATIENT
Start: 2018-08-28 | End: 2018-12-01

## 2018-08-28 NOTE — TELEPHONE ENCOUNTER
Have pharmacy run through without insurance and [email or web address] coupon.  It is $22 for 1 month.      Please fax to pharmacy and notify pt:    [email or web address]rWaYzxPmiCOXlccZ3i64rlzS7YV71nDwlJELgejlqKPM0EHWvCzX2IPTbuR0rMwhfhzCmqUtsVcnftMK2NX91nXmoTCZmYPHfE1whwPfjRDD9MFC3CpS4DNU5SuvtJjg6GgKdVUOjxSQfmO5lwNd0JCGhEgqpCwMurWCzxkBzgG8jG6K7VQFqL0HbJXXeASQ3WtueKkWrvmepzYDqhZ7wqhEotbYqCHMplTefs9KwUNK1uY4vR5Rrn0AzS76fkRIzaZRoKVIyzGExI6TpYonizWDgkaW0QTgcqM0pmIIhWF8qa4L5EsajOv9eW86hNKudF1Bau08tcpBjZLerOenptX7ff5MfMklpezCgwJ3%3D&drug_id=5447&quantity=30

## 2018-08-28 NOTE — TELEPHONE ENCOUNTER
patient states the Spirolactone is $600 for 30 days.  Is there an alternative medication?    Yamilet Willis

## 2018-08-28 NOTE — MR AVS SNAPSHOT
After Visit Summary   8/28/2018    Kavitha Headley    MRN: [id number]           Patient Information     Date Of Birth          1945        Visit Information        Provider Department      8/28/2018 3:00 PM Nayeli Castorena PA-C [facility]        Today's Diagnoses     Esophagitis    -  1    Inguinal lymphadenopathy        Aortic dilatation (H)        Liver disease, chronic, due to alcohol (H)        Thrombocytopenia (H)        Paroxysmal supraventricular tachycardia (H)        Insomnia, unspecified type        Anxiety        Breast pain, right        Depression, unspecified depression type           Follow-ups after your visit        Additional Services     CARDIOLOGY EVAL ADULT REFERRAL       Preferred location:  [facility] [phone]   [email or web address]    Please be aware that coverage of these services is subject to the terms and limitations of your health insurance plan.  Call member services at your health plan with any benefit or coverage questions.      Please bring the following to your appointment:  Any x-rays, CTs or MRIs which have been performed. Contact the facility where they were done to arrange for  prior to your scheduled appointment.    List of current medications  This referral request   Any documents/labs given to you for this referral            GASTROENTEROLOGY ADULT REF CONSULT ONLY       Preferred Location: [facility] [phone]      Please be aware that coverage of these services is subject to the terms and limitations of your health insurance plan.  Call member services at your health plan with any benefit or coverage questions.  Any procedures must be performed at a [facility] facility OR coordinated by your clinic's referral office.    Please bring the following with you to your appointment:    (1) Any X-Rays, CTs or MRIs which have been performed.   Contact the facility where they were done to arrange for  prior to your scheduled appointment.    (2) List of current medications   (3) This referral request   (4) Any documents/labs given to you for this referral            MENTAL HEALTH REFERRAL  - Adult; Psychiatry and Medication Management; Psychiatry; [facility] [facility] [phone].  Medication management & future refills will be returned to G PCP upon completion of evaluation; We jignesh...       All scheduling is subject to the client's specific insurance plan & benefits, provider/location availability, and provider clinical specialities.  Please arrive 15 minutes early for your first appointment and bring your completed paperwork.    Please be aware that coverage of these services is subject to the terms and limitations of your health insurance plan.  Call member services at your health plan with any benefit or coverage questions.                            Future tests that were ordered for you today     Open Future Orders        Priority Expected Expires Ordered    MA Diagnostic Digital Bilateral Routine  8/28/2019 8/28/2018    US Breast Right Complete 4 Quadrants Routine  8/28/2019 8/28/2018            Who to contact     If you have questions or need follow up information about today's clinic visit or your schedule please contact [facility] directly at [phone].  Normal or non-critical lab and imaging results will be communicated to you by MyChart, letter or phone within 4 business days after the clinic has received the results. If you do not hear from us within 7 days, please contact the clinic through MyChart or phone. If you have a critical or abnormal lab result, we will notify you by phone as soon as possible.  Submit refill requests through [employer]Book[employer] or call your pharmacy and they will forward the refill request to us. Please allow 3 business days for your refill to be completed.           "Additional Information About Your Visit        [email or web address]!hart Information     Docker gives you secure access to your electronic health record. If you see a primary care provider, you can also send messages to your care team and make appointments. If you have questions, please call your primary care clinic.  If you do not have a primary care provider, please call [phone] and they will assist you.        Care EveryWhere ID     This is your Care EveryWhere ID. This could be used by other organizations to access your [place] medical records  [id number]        Your Vitals Were     Pulse Temperature Height Pulse Oximetry Breastfeeding? BMI (Body Mass Index)    99 98.5  F (36.9  C) (Oral) 5' 4\" (1.626 m) 97% No 29.52 kg/m2       Blood Pressure from Last 3 Encounters:   08/28/18 126/83   08/09/18 126/90   06/18/18 118/62    Weight from Last 3 Encounters:   08/28/18 172 lb (78 kg)   08/07/18 176 lb 4.8 oz (80 kg)   06/14/18 183 lb (83 kg)              We Performed the Following     CARDIOLOGY EVAL ADULT REFERRAL     GASTROENTEROLOGY ADULT REF CONSULT ONLY     MENTAL HEALTH REFERRAL  - Adult; Psychiatry and Medication Management; Psychiatry; [facility]: [facility]iatry Service [phone].  Medication management & future refills will be returned to G PCP upon completion of evaluation; We jignesh...          Today's Medication Changes          These changes are accurate as of 8/28/18  3:54 PM.  If you have any questions, ask your nurse or doctor.               These medicines have changed or have updated prescriptions.        Dose/Directions    spironolactone 100 MG tablet   Commonly known as:  ALDACTONE   This may have changed:  how to take this   Used for:  Aortic dilatation (H), Paroxysmal supraventricular tachycardia (H)   Changed by:  Nayeli Castorena PA-C        Dose:  100 mg   Take 1 tablet (100 mg) by mouth daily   Quantity:  90 tablet   Refills:  1         Stop taking these medicines if you haven't " already. Please contact your care team if you have questions.     hydrOXYzine 25 MG tablet   Commonly known as:  ATARAX   Stopped by:  Nayeli Castorena PA-C                Where to get your medicines      These medications were sent to [facility]/pharmacy #[id number] - [place] - [address] [place] [address]  [address] [place] [address]     Phone:  [phone]     spironolactone 100 MG tablet    sucralfate 1 GM/10ML suspension    traZODone 100 MG tablet    vortioxetine 10 MG tablet                Primary Care Provider Office Phone # Fax #    Nayeli Castorena PA-C [phone]       [address] [place] [id number]        Equal Access to Services     MACIEJ BUSH : Hadii юлия christian hadasho Soomaali, waaxda luqadaha, qaybta kaalmada adeegyada, waxlauren yain hayrosalva vides . So [facility] [phone].    ATENCIÓN: Si habla español, tiene a gaytan disposición servicios gratuitos de asistencia lingüística. Llame al [phone].    We comply with applicable federal civil rights laws and [place] laws. We do not discriminate on the basis of race, color, national origin, age, disability, sex, sexual orientation, or gender identity.            Thank you!     Thank you for choosing [facility]  for your care. Our goal is always to provide you with excellent care. Hearing back from our patients is one way we can continue to improve our services. Please take a few minutes to complete the written survey that you may receive in the mail after your visit with us. Thank you!             Your Updated Medication List - Protect others around you: Learn how to safely use, store and throw away your medicines at www.disposemymeds.org.          This list is accurate as of 8/28/18  3:54 PM.  Always use your most recent med list.                   Brand Name Dispense Instructions for use Diagnosis    B-12 1000 MCG Tbcr     100 tablet    Take 1,000 mcg by mouth daily    Vitamin  B12 deficiency       CENTRUM SILVER per tablet      Take 1 tablet by mouth daily        diphenhydrAMINE 25 MG capsule    BENADRYL     Take 25 mg by mouth nightly as needed        folic acid 1 MG tablet    FOLVITE    30 tablet    Take 1 tablet (1 mg) by mouth daily    Alcohol abuse       furosemide 20 MG tablet    LASIX    15 tablet    TAKE 1 TABLET BY MOUTH EVERY DAY    Benign hypertension       gabapentin 300 MG capsule    NEURONTIN    90 capsule    Take 1 capsule (300 mg) by mouth 3 times daily    Chronic pain syndrome       ketoconazole 2 % shampoo    NIZORAL    120 mL    Apply to the affected area and wash off after 5 minutes.    Tinea capitis       magnesium oxide 400 MG tablet    MAG-OX    7 tablet    Take 1 tablet (400 mg) by mouth daily    Low magnesium levels       melatonin 3 MG tablet      Take 9 mg by mouth nightly as needed for sleep        polyethylene glycol Packet    MIRALAX/GLYCOLAX    7 packet    Take 17 g by mouth daily as needed (constipation)    Constipation, unspecified constipation type       senna-docusate 8.6-50 MG per tablet    SENOKOT-S;PERICOLACE    100 tablet    Take 1-2 tablets by mouth 2 times daily as needed for constipation    Constipation, unspecified constipation type       spironolactone 100 MG tablet    ALDACTONE    90 tablet    Take 1 tablet (100 mg) by mouth daily    Aortic dilatation (H), Paroxysmal supraventricular tachycardia (H)       sucralfate 1 GM/10ML suspension    CARAFATE    1200 mL    Take 10 mLs (1 g) by mouth 4 times daily (before meals and nightly) Pt takes once daily    Esophagitis       thiamine 100 MG tablet      Take 1 tablet (100 mg) by mouth daily    Alcohol abuse       traZODone 100 MG tablet    DESYREL    90 tablet    TAKE 1 TABLET (100 MG) BY MOUTH NIGHTLY AS NEEDED FOR SLEEP    Insomnia, unspecified type       valACYclovir 1000 mg tablet    VALTREX    4 tablet    TAKE 2 TABS EVERY 12 HRS FOR 1 DAY ONLY FOR OUTBREAKS OF COLD SORES as needed    Herpes  simplex virus infection       vortioxetine 10 MG tablet    BRINTELLIX    90 tablet    Take 1 tablet (10 mg) by mouth daily    Depression, unspecified depression type, Anxiety

## 2018-08-28 NOTE — PROGRESS NOTES
SUBJECTIVE:   Kavitha Headley is a 72 year old female who presents to clinic today for the following health issues:      Linda presents to clinic today to update and reassess her medications. She was recently hospitalized at [facility] on 8/3/2018-8/9/2018 due to nausea vomiting and diarrhea. She has been sober since her hospitalization    Anxiety/Depression  She has been having incredible anxiety due to financial problems and filing for bankruptcy.  Reports being sober from ETOH since hospitalization.    Breast pain, right  She still has intermittent pain in the right breast but denies feeling any lumps or nipple discharge (nearly a year now).  She reports she never followed up from her previous visit regarding this same pain.     Benign hypertension, Aortic dilatation (H), Paroxysmal supraventricular tachycardia   Kavitha was seen at the [facility] on 8/24/3017 by Dr. Bartholomew. She was advised to return in a year with an echo to follow up on her aortic valve disease. She reports she has not followed up or scheduled a follow up yet. She also reports she is no longer taking spirolactone (100mg daily) which was prescribed by cardiology, she was not aware she was supposed to.     Inguinal lymphadenopathy, Thrombocytopenia (H)  Kavitha had a CT scan on 6/14/2017 by Dr. Pa at [facility] that revealed inguinal lymphadenopathy. A follow up in 6 months was recommended however Kavitha denies following up.  Hematology/Oncology for thrombocytopenia secondary to alcohol use.      Lab Results   Component Value Date    WBC 6.4 08/03/2018     Lab Results   Component Value Date    RBC 3.57 08/03/2018     Lab Results   Component Value Date    HGB 12.8 08/03/2018     Lab Results   Component Value Date    HCT 37.3 08/03/2018     Lab Results   Component Value Date     08/03/2018     Lab Results   Component Value Date    MCH 35.9 08/03/2018     Lab Results   Component Value Date    MCHC  34.3 08/03/2018     Lab Results   Component Value Date    RDW 14.7 08/03/2018     Lab Results   Component Value Date     08/03/2018         Esophagitis, Liver disease, chronic, due to alcohol (H)  Kavitha has been seeing Dr. Graham for esophagitis and had an endoscopy performed on 6/12/2017.  She reports she has not followed up with him.  MTM recommended not using carafate long term due to malabsorption of other substances with this medication.      Problem list and histories reviewed & adjusted, as indicated.  Additional history: as documented    Patient Active Problem List   Diagnosis     Hyperlipidemia LDL goal <130     Spinal stenosis     Chronic insomnia     Alcohol abuse     CKD (chronic kidney disease) stage 3, GFR 30-59 ml/min     Hip joint replacement status     Knee joint replacement status     Chronic pain syndrome     Bariatric surgery status     Personal history of urinary calculi     Macrocytic anemia     Anxiety     Aortic stenosis     Left-sided low back pain without sciatica     PAC (premature atrial contraction)     Gastroesophageal reflux disease, esophagitis presence not specified     Controlled substance agreement signed     Seasonal affective disorder (H)     HTN, goal below 140/90     Bilateral lower leg cellulitis     Hypokalemia     Hyponatremia     Cellulitis     Depression, unspecified depression type     Vitamin B12 deficiency     Severe alcohol dependence (H)     Anxiety and depression     Liver disease, chronic, due to alcohol (H)     Thrombocytopenia (H)     Paroxysmal supraventricular tachycardia (H)     Aortic dilatation (H)     Past Surgical History:   Procedure Laterality Date     APPENDECTOMY  3/2004    incidental     C GASTRIC BYPASS,OBESE<100CM ARIANNA-EN-Y  1996     C MEDIASTINOSCOPY W OR WO BIOPSY  2/2008    Videomediastinoscopy and, for mediastinal adenopathy -reactive lymphoid hyperplasia     C REPAIR OF RECTOCELE  3/2012     C TOTAL KNEE ARTHROPLASTY  12/2005    left       CARPAL TUNNEL RELEASE RT/LT  10/2010    Carpometacarpal excisional arthroplasty with a fascial autograft and APL suspension sling (34054). 2. Left thumb metacarpophalangeal joint fusion with autologous bone graft (93296). 3. Left endoscopic carpal tunnel release      CHOLECYSTECTOMY, LAPOROSCOPIC  11/2010    Cholecystectomy, Laparoscopic     COLONOSCOPY N/A 9/8/2016    Procedure: COMBINED COLONOSCOPY, SINGLE OR MULTIPLE BIOPSY/POLYPECTOMY BY BIOPSY;  Surgeon: Moe Barlow MD;  Location:  GI     CYSTOCELE REPAIR  11/2012    davinc laparoscopic sacrocolpopexy, enterocele repair, lysis of adhesions, placement of retropubic mid urethral sling, cystoscopy     CYSTOSCOPY, LITHOTRIPSY, COMBINED  6/2006    Left extracorporeal shock wave lithotripsy, cystoscopy, left ureteral stent placement.     CYSTOSCOPY, REMOVE STENT(S), COMBINED  7/2006    Cystoscopy, removal of left ureteral stent, retrograde pyelography, flexible and rigid ureteroscopy and holmium laser lithotripsy, basket removal of stone fragments, ureteral stent placement.      ENDOSCOPIC ULTRASOUND UPPER GASTROINTESTINAL TRACT (GI) N/A 6/12/2017    Procedure: ENDOSCOPIC ULTRASOUND, ESOPHAGOSCOPY / UPPER GASTROINTESTINAL TRACT (GI);  ENDOSCOPIC ULTRASOUND, ESOPHAGOSCOPY / UPPER GASTROINTESTINAL TRACT (GI);  Surgeon: Parth Graham MD;  Location:  GI     HERNIA REPAIR  4/2012    bilateral augmentation mastopexy, ventral hernia repair, and medial thigh liposuction on 04/06/2012.      HYSTERECTOMY VAGINAL, BILATERAL SALPINGO-OOPHERECTOMY, COMBINED  1998    due to myoma and bleeding     JOINT REPLACEMENT, HIP RT/LT  4/2004    right total hip arthroplasty     LAPAROTOMY, LYSIS ADHESIONS, COMBINED  3/2004    lysis adhesions, ventral hernia repair, appendectomy incidentally     LYMPH NODE BIOPSY  4/2008    right axillary, reactive follicular and paracortical hyperplasia.     MAMMOPLASTY AUGMENTATION BILATERAL  4/2012     REVISE RECONSTRUCTED BREAST   6/7/2012    Left breast capsulotomy.        Social History   Substance Use Topics     Smoking status: Never Smoker     Smokeless tobacco: Never Used     Alcohol use No      Comment: none     Family History   Problem Relation Age of Onset     Substance Abuse Father      Cancer Father      throat and lung mets     Diabetes No family hx of      Coronary Artery Disease No family hx of      Cerebrovascular Disease No family hx of          Current Outpatient Prescriptions   Medication Sig Dispense Refill     Cyanocobalamin (B-12) 1000 MCG TBCR Take 1,000 mcg by mouth daily 100 tablet 1     diphenhydrAMINE (BENADRYL) 25 MG capsule Take 25 mg by mouth nightly as needed       folic acid (FOLVITE) 1 MG tablet Take 1 tablet (1 mg) by mouth daily 30 tablet      furosemide (LASIX) 20 MG tablet Take 1 tablet (20 mg) by mouth daily 90 tablet 0     gabapentin (NEURONTIN) 300 MG capsule Take 1 capsule (300 mg) by mouth 3 times daily 90 capsule 1     ketoconazole (NIZORAL) 2 % shampoo Apply to the affected area and wash off after 5 minutes. 120 mL 1     magnesium oxide (MAG-OX) 400 MG tablet Take 1 tablet (400 mg) by mouth daily 7 tablet      melatonin 3 MG tablet Take 9 mg by mouth nightly as needed for sleep        Multiple Vitamins-Minerals (CENTRUM SILVER) per tablet Take 1 tablet by mouth daily       polyethylene glycol (MIRALAX/GLYCOLAX) Packet Take 17 g by mouth daily as needed (constipation) 7 packet      senna-docusate (SENOKOT-S;PERICOLACE) 8.6-50 MG per tablet Take 1-2 tablets by mouth 2 times daily as needed for constipation 100 tablet      spironolactone (ALDACTONE) 100 MG tablet Take 1 tablet (100 mg) by mouth daily 90 tablet 1     sucralfate (CARAFATE) 1 GM/10ML suspension Take 10 mLs (1 g) by mouth 4 times daily (before meals and nightly) Pt takes once daily 1200 mL 1     thiamine 100 MG tablet Take 1 tablet (100 mg) by mouth daily       traZODone (DESYREL) 100 MG tablet TAKE 1 TABLET (100 MG) BY MOUTH NIGHTLY AS  "NEEDED FOR SLEEP 90 tablet 1     valACYclovir (VALTREX) 1000 mg tablet TAKE 2 TABS EVERY 12 HRS FOR 1 DAY ONLY FOR OUTBREAKS OF COLD SORES as needed 4 tablet 3     vortioxetine (BRINTELLIX) 10 MG tablet Take 1 tablet (10 mg) by mouth daily 90 tablet 0     Allergies   Allergen Reactions     Bactrim [Sulfamethoxazole W/Trimethoprim] Hives     Codeine Itching     NAUSEA     Morphine Itching     NAUSEA       Reviewed and updated as needed this visit by clinical staff  Tobacco  Allergies  Meds  Problems  Med Hx  Surg Hx  Fam Hx  Soc Hx        Reviewed and updated as needed this visit by Provider  Tobacco  Allergies  Meds  Problems  Med Hx  Surg Hx  Fam Hx  Soc Hx          ROS:  Constitutional, HEENT, cardiovascular, pulmonary, GI, , musculoskeletal, neuro, skin, endocrine and psych systems are negative, except as otherwise noted.    This document serves as a record of the services and decisions personally performed and made by PATRICIA Jalloh. It was created on his behalf by Melva Vega, a trained medical scribe. The creation of this document is based on the provider's statements to the medical scribe.  Melva Vega August 28, 2018 3:53 PM      OBJECTIVE:     /83 (BP Location: Right arm, Patient Position: Chair, Cuff Size: Adult Regular)  Pulse 99  Temp 98.5  F (36.9  C) (Oral)  Ht 5' 4\" (1.626 m)  Wt 172 lb (78 kg)  SpO2 97%  Breastfeeding? No  BMI 29.52 kg/m2  Body mass index is 29.52 kg/(m^2).  GENERAL: healthy, alert and no distress  RESP: lungs clear to auscultation - no rales, rhonchi or wheezes  CV: II/VI systolic ejection murmur, normal S1 S2, no S3 or S4, click or rub, no peripheral edema and peripheral pulses strong  MS: no gross musculoskeletal defects noted, no edema  SKIN: no suspicious lesions or rashes  NEURO: Normal strength and tone, mentation intact and speech normal  PSYCH: mentation appears normal, affect normal/bright    Diagnostic Test Results:  none "     ASSESSMENT/PLAN:       Kavitha was seen today for recheck medication.    Diagnoses and all orders for this visit:    Esophagitis, Liver disease, chronic, due to alcohol (H)  Follow up due with gastroenterology, new referral placed.  Advised pt that carafate not ideal long tern - can cause malabsorption.    -     sucralfate (CARAFATE) 1 GM/10ML suspension; Take 10 mLs (1 g) by mouth 4 times daily (before meals and nightly) Pt takes once daily  - GASTROENTEROLOGY ADULT REF CONSULT ONLY    Inguinal lymphadenopathy, Thrombocytopenia (H)  Due for follow up with hematology and follow up CT scan.    Benign hypertension, Aortic dilatation (H), Paroxysmal supraventricular tachycardia (H)  Follow up due with cardiology.  -     spironolactone (ALDACTONE) 100 MG tablet; Take 1 tablet (100 mg) by mouth daily  -     CARDIOLOGY EVAL ADULT REFERRAL    Insomnia, unspecified type, Anxiety, Depression, unspecified depression type  Refill medication for 90 days, advised once again patient needs to establish with psychiatry for mental health, patient voiced and understood agreement  -     traZODone (DESYREL) 100 MG tablet; TAKE 1 TABLET (100 MG) BY MOUTH NIGHTLY AS NEEDED FOR SLEEP  -     MENTAL HEALTH REFERRAL  - Adult; Psychiatry and Medication Management; [place] [facility] [place] [facility] [phone].  Medication management & future refills will be returned to G PCP upon completion of evaluation; Marty egan...  -     vortioxetine (BRINTELLIX) 10 MG tablet; Take 1 tablet (10 mg) by mouth daily    Breast pain, right  Patient due for mammogram follow up  -     MA Diagnostic Digital Bilateral; Future  -     US Breast Right Complete 4 Quadrants; Future      Return in about 4 weeks (around 9/25/2018) for with psychiatry, cardiology, mammogram, and hematology.    Greater than 45 minutes were spent with the patient. The majority of this time was coordinating care and counseling regarding the above diagnosis .    The  information in this document, created by the medical scribe for me, accurately reflects the services I personally performed and the decisions made by me. I have reviewed and approved this document for accuracy prior to leaving the patient care area.  August 28, 2018 3:53 PM    Nayeli Castorena PA-C  [facility] [place]

## 2018-08-29 NOTE — TELEPHONE ENCOUNTER
Attempt #1  Called patient @ [phone] - Left a non-detailed message to call back and speak with any triage nurse.    Sara Williamson RN  [facility]

## 2018-08-30 DIAGNOSIS — B00.9 HERPES SIMPLEX VIRUS INFECTION: ICD-10-CM

## 2018-08-30 NOTE — TELEPHONE ENCOUNTER
Attempt #2  Called # below - Left a non-detailed message to call back and speak with any triage nurse.    Sara Williamson RN  [facility]

## 2018-08-30 NOTE — TELEPHONE ENCOUNTER
"Requested Prescriptions   Pending Prescriptions Disp Refills     valACYclovir (VALTREX) 1000 mg tablet [Pharmacy Med Name: VALACYCLOVIR HCL 1 GRAM TABLET]  Last Written Prescription Date:  05/09/2017  Last Fill Quantity: 4 tablet,  # refills: 3   Last office visit: 8/28/2018 with prescribing provider:  Nayeli Castorena PA-C    Future Office Visit:   Next 5 appointments (look out 90 days)     Sep 10, 2018  3:20 PM CDT   Return Visit with Nahum Pa MD   [facility]    [facility]  [address] [facility] [address]  [facility] [address]   [phone]                  4 tablet 2     Sig: TAKE 2 TABS BY MOUTH EVERY 12 HOURS FOR 1 DAY ONLY FOR OUTBREAKS OF COLD SORES AS NEEDED    Antivirals for Herpes Protocol Passed    8/30/2018 11:25 AM       Passed - Patient is age 12 or older       Passed - Recent (12 mo) or future (30 days) visit within the authorizing provider's specialty    Patient had office visit in the last 12 months or has a visit in the next 30 days with authorizing provider or within the authorizing provider's specialty.  See \"Patient Info\" tab in inbasket, or \"Choose Columns\" in Meds & Orders section of the refill encounter.           Passed - Normal serum creatinine on file in past 12 months    Recent Labs   Lab Test  08/04/18   0900   09/25/17   1459   CR  0.72   < >   --    CREAT   --    --   0.9    < > = values in this interval not displayed.               "

## 2018-08-30 NOTE — TELEPHONE ENCOUNTER
The spironolactone only cost $10 through insurance. Pharmacy has it ready.    Pt had called and is aware.  Merlene Ibarra RN  [place] [facility]

## 2018-08-31 RX ORDER — VALACYCLOVIR HYDROCHLORIDE 1 G/1
TABLET, FILM COATED ORAL
Qty: 4 TABLET | Refills: 2 | Status: SHIPPED | OUTPATIENT
Start: 2018-08-31 | End: 2018-12-12

## 2018-08-31 NOTE — DISCHARGE SUMMARY
[facility] Discharge Summary      DATE OF ADMISSION: 8/3/2018     DATE OF DISCHARGE: 8/9/18    PRIMARY CARE PHYSICIAN: Nayeli Castorena    IDENTIFICATION: Pt is a 71 year old pleasant female with a past medical history of hypertension, dyslipidemia, obesity status post gastric bypass, alcohol abuse, chronic pain syndrome on a pain agreement, history of left lower extremity cellulitis, history of macrocytic anemia, moderate aortic stenosis, chronic kidney disease stage III, history of supraventricular tachycardia, and depression/anxiety. For history, see dictation by  Juan Funk on 8/04/18. For physical summary, see dictation by Michelle Cotter APR on 8/09/18.     HOSPITAL COURSE:     Pt slept much better with Remeron and Seroquel. Pt feeling much more rested and less thirsty for alcohol. Pt took Antabuse despite a great deal of trying to find excuses not to. No side effects. Pt still wants out. Her CIWA score is decreaing. Yesterday she was still in withdrawal.  Pt att[facility] in [employer] [facility] [place] and she attends a speaker group in [place]. Pt has good intentions. Longest sober period for 3 years More than 10 years. Pt has a niece who has offered to help her. Pt will sign a release to speak with her.   Patient is still extremely focused on being discharged Dr. Vargas confronted her and patient's explained to her the high risk of relapse.  Explained to patient that Dr. Vargas wanted collateral history from her daughter-in-law which she signed a release for this Melva Headley at [phone].  Dr. Young called and left message has not heard back. Patient at this point high risk to relapse to continue to have her in the hospital until more services set up she agreed to have her daughter-in-law watch her take the Antabuse.  But this has not been confirmed we need to confirm this prior to discharge.          DISCHARGE MENTAL STATUS EXAMINATION:     Appearance Sitting in  "chair, dressed in clothes. Appears stated age.   Attitude Cooperative   Orientation Oriented to person, place, time   Eye Contact Poor   Speech Regular rate, rhythm, volume and tone   Language Normal   Psychomotor Behavior Normal   Mood depressed   Affect depressed   Thought Process Goal-Oriented, Intact   Associations Intact   Thought Content Patient is currently negative for suicidal ideation, negative for plan or intent, able to contract no self harm and identify barriers to suicide.  Negative for obsessions, compulsions or psychosis.     Fund of Knowledge intact   Insight impaired   Judgement impaired   Attention Span & Concentration poor   Recent & Remote Memory intact   Gait Normal   Muscle Tone Intact           LABORATORY DATA:        /90  Pulse 90  Temp 97.6  F (36.4  C) (Oral)  Resp 16  Ht 1.626 m (5' 4\")  Wt 80 kg (176 lb 4.8 oz)  SpO2 95%  BMI 30.26 kg/m2     DISCHARGE MEDICATIONS:      Review of your medicines      CONTINUE these medicines which have NOT CHANGED       Dose / Directions    B-12 1000 MCG Tbcr   Used for:  Vitamin B12 deficiency        Dose:  1000 mcg   Take 1,000 mcg by mouth daily   Quantity:  100 tablet   Refills:  1       CENTRUM SILVER per tablet        Dose:  1 tablet   Take 1 tablet by mouth daily   Refills:  0       diphenhydrAMINE 25 MG capsule   Commonly known as:  BENADRYL        Dose:  25 mg   Take 25 mg by mouth nightly as needed   Refills:  0       folic acid 1 MG tablet   Commonly known as:  FOLVITE   Used for:  Alcohol abuse        Dose:  1 mg   Take 1 tablet (1 mg) by mouth daily   Quantity:  30 tablet   Refills:  0       ketoconazole 2 % shampoo   Commonly known as:  NIZORAL   Used for:  Tinea capitis        Apply to the affected area and wash off after 5 minutes.   Quantity:  120 mL   Refills:  1       magnesium oxide 400 MG tablet   Commonly known as:  MAG-OX   Used for:  Low magnesium levels        Dose:  400 mg   Take 1 tablet (400 mg) by mouth daily "   Quantity:  7 tablet   Refills:  0       melatonin 3 MG tablet        Dose:  9 mg   Take 9 mg by mouth nightly as needed for sleep   Refills:  0       polyethylene glycol Packet   Commonly known as:  MIRALAX/GLYCOLAX   Used for:  Constipation, unspecified constipation type        Dose:  17 g   Take 17 g by mouth daily as needed (constipation)   Quantity:  7 packet   Refills:  0       senna-docusate 8.6-50 MG per tablet   Commonly known as:  SENOKOT-S;PERICOLACE   Used for:  Constipation, unspecified constipation type        Dose:  1-2 tablet   Take 1-2 tablets by mouth 2 times daily as needed for constipation   Quantity:  100 tablet   Refills:  0       thiamine 100 MG tablet   Used for:  Alcohol abuse        Dose:  100 mg   Take 1 tablet (100 mg) by mouth daily   Refills:  0       valACYclovir 1000 mg tablet   Commonly known as:  VALTREX   Used for:  Herpes simplex virus infection        TAKE 2 TABS EVERY 12 HRS FOR 1 DAY ONLY FOR OUTBREAKS OF COLD SORES as needed   Quantity:  4 tablet   Refills:  3         STOP taking          furosemide 20 MG tablet   Commonly known as:  LASIX           gabapentin 300 MG capsule   Commonly known as:  NEURONTIN           GABAPENTIN PO           spironolactone 100 MG tablet   Commonly known as:  ALDACTONE           sucralfate 1 GM/10ML suspension   Commonly known as:  CARAFATE           temazepam 7.5 MG capsule   Commonly known as:  RESTORIL           traZODone 100 MG tablet   Commonly known as:  DESYREL           vortioxetine 10 MG tablet   Commonly known as:  BRINTELLIX                 DISCHARGE DIAGNOSES:    1.Major depression, recurrent, severe, without psychotic features.  2. Alcohol use Disorder    DISCHARGE PLAN:   1. Explained side effects, benefits, and complications of medications to the patient, Pt gave verbal consent.  2. Medication changes: Remeron 7.5mg hs seroquel 25mg hs  3. Antabuse 250mg  Pt have SO watch her take it.   4. Discussed treatment plan with patient and  team.  5. Projected length of stay: 5 days         DISCHARGE FOLLOW-UP:     [address] [address]  [phone] / Fax: [phone]     Attestation:   Patient has been seen and evaluated by me, Colton Vargas MD.    Patient ID:    Name: Kavitha Headley MRN: [id number]  Admission: 8/3/2018  YOB: 1945

## 2018-08-31 NOTE — TELEPHONE ENCOUNTER
Prescription approved per [facility] Refill Protocol.  Merlene Ibarra RN  [facility]

## 2018-09-06 ENCOUNTER — OFFICE VISIT (OUTPATIENT)
Dept: CARDIOLOGY | Facility: CLINIC | Age: 73
End: 2018-09-06
Attending: PHYSICIAN ASSISTANT
Payer: COMMERCIAL

## 2018-09-06 VITALS
OXYGEN SATURATION: 95 % | BODY MASS INDEX: 29.19 KG/M2 | HEART RATE: 98 BPM | SYSTOLIC BLOOD PRESSURE: 116 MMHG | DIASTOLIC BLOOD PRESSURE: 80 MMHG | WEIGHT: 171 LBS | HEIGHT: 64 IN

## 2018-09-06 DIAGNOSIS — I35.0 NONRHEUMATIC AORTIC VALVE STENOSIS: Primary | ICD-10-CM

## 2018-09-06 DIAGNOSIS — F10.10 EXCESSIVE DRINKING ALCOHOL: ICD-10-CM

## 2018-09-06 DIAGNOSIS — R00.0 SINUS TACHYCARDIA: ICD-10-CM

## 2018-09-06 DIAGNOSIS — R94.5 ABNORMAL RESULTS OF LIVER FUNCTION STUDIES: ICD-10-CM

## 2018-09-06 PROCEDURE — 99214 OFFICE O/P EST MOD 30 MIN: CPT | Mod: 25 | Performed by: INTERNAL MEDICINE

## 2018-09-06 PROCEDURE — 93000 ELECTROCARDIOGRAM COMPLETE: CPT | Performed by: INTERNAL MEDICINE

## 2018-09-06 RX ORDER — ATENOLOL 25 MG/1
25 TABLET ORAL EVERY MORNING
Qty: 30 TABLET | Refills: 3 | Status: SHIPPED | OUTPATIENT
Start: 2018-09-06 | End: 2018-12-10

## 2018-09-06 NOTE — LETTER
9/6/2018      Nayeli Castorena PA-C  [address] [facility] [address]      RE: Kavitha Headley       Dear Colleague,    I had the pleasure of seeing Kavitha Headley in the [facility].    Service Date: 09/06/2018      PRIMARY CARE AND REFERRING PROVIDER:  Nayeli Castorena      REASON FOR VISIT:  Yearly followup of moderate aortic valve stenosis and mild ascending aorta dilatation.      HISTORY OF PRESENT ILLNESS:  Kavitha Headley is new to my practice.  She was previously seen by Dr. Nayeli Bartholomew who is no longer at this practice.  Kavitha is a very pleasant 72-year-old lady (who looks much younger than her stated age) who is known to have moderate aortic valve stenosis and mild ascending aorta dilatation (4.1 cm).  She has a history of atypical chest pain but minimal obstructive coronary disease on coronary angiogram in 2015.  In the past, she had frequent PVCs and palpitations but these have settled down for over a year.  She is not known to have a history of atrial fibrillation.  Pertinent history is that of alcohol excess for the last 25 or so years.  The patient seems to have insight into the problem.  She was recently hospitalized from 8/03 to 08/09/2018 for alcohol abuse and alcohol withdrawal symptoms.  She candidly states that she has been battling alcohol for a long time and hopes to be consistently abstinent.      She goes to water aerobics once a week, tends to walk when she can and there have been no interval symptoms of chest pain, exertional dyspnea, palpitations, presyncope or syncope.      She was supposed to have a followup echocardiogram which unfortunately has not been done.      ECG done today shows sinus rhythm 84 BPM with diffuse low voltage.        LABORATORIES:  Potassium 4.2, creatinine 0.7.  TSH 3.0.  Mildly elevated liver enzymes with an ALT of 93, bilirubin 1.1, alkaline phosphatase 139, .  Hemoglobin 12.8, platelets 173.  No recent INR.       PHYSICAL EXAMINATION:   VITAL SIGNS:  Blood pressure is 116/80 mmHg, pulse 98 BPM (sinus tachycardia), height 1.6 meters (5 feet 4 inches), weight 77.6 kg (171 pounds), BMI 29.4 kg/m2, sats 95% on room air.   CONSTITUTIONAL:  Comfortable at rest, alert, oriented, centrally obese and looks several years younger than her stated age.   ENT:  Satisfactory dentition.   RESPIRATORY:  Bilaterally clear to auscultation.   CARDIOVASCULAR:  Normal  JVP.  Normal carotid pulse upstroke.  Apical impulse not palpable due to body habitus.  Regular but tachycardic heart sounds.  She has mid-peaking ejection systolic murmur consistent with moderately severe aortic stenosis.  Due to the resting tachycardia, it is difficult to assess severity.     GASTROINTESTINAL:  Soft, nontender.   EXTREMITIES:  No edema or clubbing.   NEUROPSYCHIATRIC:  Alert, oriented x3, normal affect.  Well groomed and dressed.  No gross motor deficits.      DIAGNOSES:     1.  Non-rheumatic moderate aortic valve stenosis.   2.  Sinus tachycardia.   3.  Recent hospitalization with alcohol withdrawal/excess.   4.  Minimal coronary artery disease on coronary angiogram in 2015.      ASSESSMENT AND PLAN:  Kavitha is symptomatically stable.  I was sorry to hear about her ivey with alcohol and recent hospitalization.  She does have sinus tachycardia which could be related to the alcohol issues.  Her TSH is normal at 3.0 and she is not anemic (12.8).   1.  Followup echocardiogram.  Occasionally, alcohol can be associated with a pericardial effusion but clinically, she has no evidence of tamponade.  We will confirm with echocardiogram, both to rule out effusion (small complexes on electrocardiogram) and ongoing assessment of aortic stenosis severity.    2.  Start atenolol 25 mg daily for her sinus tachycardia.    3.  Follow up in clinic to discuss results.      It was my pleasure to see Kvaitha.  I look forward to seeing her again.      cc:   Nayeli Castorena PA-C     [facility]    [address] [place] [id number]         [facility]             D: 2018   T: 2018   MT: CEDRIC      Name:     DARWIN JASSO   MRN:      [id number]        Account:      [id number]   :      1945           Service Date: 2018      Document: [id number]         Outpatient Encounter Prescriptions as of 2018   Medication Sig Dispense Refill     atenolol (TENORMIN) 25 MG tablet Take 1 tablet (25 mg) by mouth every morning 30 tablet 3     Cyanocobalamin (B-12) 1000 MCG TBCR Take 1,000 mcg by mouth daily 100 tablet 1     diphenhydrAMINE (BENADRYL) 25 MG capsule Take 25 mg by mouth nightly as needed       folic acid (FOLVITE) 1 MG tablet Take 1 tablet (1 mg) by mouth daily 30 tablet      furosemide (LASIX) 20 MG tablet Take 1 tablet (20 mg) by mouth daily 90 tablet 0     gabapentin (NEURONTIN) 300 MG capsule Take 1 capsule (300 mg) by mouth 3 times daily 90 capsule 1     ketoconazole (NIZORAL) 2 % shampoo Apply to the affected area and wash off after 5 minutes. 120 mL 1     magnesium oxide (MAG-OX) 400 MG tablet Take 1 tablet (400 mg) by mouth daily 7 tablet      melatonin 3 MG tablet Take 9 mg by mouth nightly as needed for sleep        Multiple Vitamins-Minerals (CENTRUM SILVER) per tablet Take 1 tablet by mouth daily       polyethylene glycol (MIRALAX/GLYCOLAX) Packet Take 17 g by mouth daily as needed (constipation) 7 packet      senna-docusate (SENOKOT-S;PERICOLACE) 8.6-50 MG per tablet Take 1-2 tablets by mouth 2 times daily as needed for constipation 100 tablet      spironolactone (ALDACTONE) 100 MG tablet Take 1 tablet (100 mg) by mouth daily 90 tablet 1     thiamine 100 MG tablet Take 1 tablet (100 mg) by mouth daily       traZODone (DESYREL) 100 MG tablet TAKE 1 TABLET (100 MG) BY MOUTH NIGHTLY AS NEEDED FOR SLEEP 90 tablet 1     valACYclovir (VALTREX) 1000 mg tablet TAKE 2 TABS BY MOUTH EVERY 12 HOURS FOR 1 DAY ONLY FOR OUTBREAKS OF  COLD SORES AS NEEDED 4 tablet 2     vortioxetine (BRINTELLIX) 10 MG tablet Take 1 tablet (10 mg) by mouth daily 90 tablet 0     sucralfate (CARAFATE) 1 GM/10ML suspension Take 10 mLs (1 g) by mouth 4 times daily (before meals and nightly) Pt takes once daily (Patient not taking: Reported on 9/6/2018) 1200 mL 1     No facility-administered encounter medications on file as of 9/6/2018.        Again, thank you for allowing me to participate in the care of your patient.      Sincerely,    Herman Ugarte MD     [facility]

## 2018-09-06 NOTE — PATIENT INSTRUCTIONS
MEDICATION CHANGES:  1.  Start a beta-blocker called atenolol 25 mg.  Take 1 tablet in the morning.    ADDITIONAL TESTING AND FOLLOW-UP:  1.  Heart ultrasound (transthoracic echocardiogram).  2.  Follow-up in clinic to review test results.    If you have any questions or concerns, please call my nurse Mile Mcgee at [phone].

## 2018-09-06 NOTE — PROGRESS NOTES
Service Date: 09/06/2018      PRIMARY CARE AND REFERRING PROVIDER:  Nayeli Castorena      REASON FOR VISIT:  Yearly followup of moderate aortic valve stenosis and mild ascending aorta dilatation.      HISTORY OF PRESENT ILLNESS:    Kavitha Headley is new to my practice.  She was previously seen by Dr. Nayeli Bartholomew who is no longer at this practice.  Kavitha is a very pleasant 72-year-old lady (who looks much younger than her stated age) who is known to have moderate aortic valve stenosis and mild ascending aorta dilatation (4.1 cm).  She has a history of atypical chest pain but minimal obstructive coronary disease on coronary angiogram in 2015.  In the past, she had frequent PVCs and palpitations but these have settled down for over a year.  She is not known to have a history of atrial fibrillation.  Pertinent history is that of alcohol excess for the last 25 or so years.  The patient seems to have insight into the problem.  She was recently hospitalized from 8/03 to 08/09/2018 for alcohol abuse and alcohol withdrawal symptoms.  She candidly states that she has been battling alcohol for a long time and hopes to be consistently abstinent.      She goes to water aerobics once a week, tends to walk when she can and there have been no interval symptoms of chest pain, exertional dyspnea, palpitations, presyncope or syncope.      She was supposed to have a followup echocardiogram which unfortunately has not been done.      ECG done today shows sinus rhythm 84 BPM with diffuse low voltage.        Labs:  Potassium 4.2, creatinine 0.7.  TSH 3.0.  Mildly elevated liver enzymes with an ALT of 93, bilirubin 1.1, alkaline phosphatase 139, .  Hemoglobin 12.8, platelets 173.  No recent INR.      PHYSICAL EXAMINATION:   VITAL SIGNS:  Blood pressure is 116/80 mmHg, pulse 98 BPM (sinus tachycardia), height 1.6 meters (5 feet 4 inches), weight 77.6 kg (171 pounds), BMI 29.4 kg/m2, sats 95% on room air.   CONSTITUTIONAL:   Comfortable at rest, alert, oriented, centrally obese and looks several years younger than her stated age.   ENT:  Satisfactory dentition.   RESPIRATORY:  Bilaterally clear to auscultation.   CARDIOVASCULAR:  Normal  JVP.  Normal carotid pulse upstroke.  Apical impulse not palpable due to body habitus.  Regular but tachycardic heart sounds.  She has mid-peaking ejection systolic murmur consistent with moderately severe aortic stenosis.  Due to the resting tachycardia, it is difficult to assess severity.     GASTROINTESTINAL:  Soft, nontender.   EXTREMITIES:  No edema or clubbing.   NEUROPSYCHIATRIC:  Alert, oriented x3, normal affect.  Well groomed and dressed.  No gross motor deficits.      DIAGNOSES:     1.  Non-rheumatic moderate aortic valve stenosis.   2.  Sinus tachycardia.   3.  Recent hospitalization with alcohol withdrawal/excess.   4.  Minimal coronary artery disease on coronary angiogram in 2015.      ASSESSMENT AND PLAN:    Kavitha is symptomatically stable.  I was sorry to hear about her ivey with alcohol and recent hospitalization.  She does have sinus tachycardia which could be related to the alcohol issues.  Her TSH is normal at 3.0 and she is not anemic (12.8).     1.  Followup echocardiogram.  Occasionally, alcohol can be associated with a pericardial effusion but clinically, she has no evidence of tamponade.  We will confirm with echocardiogram, both to rule out effusion (small complexes on electrocardiogram) and ongoing assessment of aortic stenosis severity.    2.  Start atenolol 25 mg daily for her sinus tachycardia.    3.  Follow up in clinic to discuss results.      It was my pleasure to see Kavitha.  I look forward to seeing her again.      cc:   Nayeli Castorena PA-C    [address] DEDE CORTEZ MD             D: 09/06/2018   T: 09/06/2018   MT: CEDRIC      Name:     KAVITHA JASSO   MRN:      [id number]        Account:       [id number]   :      1945           Service Date: 2018      Document: [id number]

## 2018-09-06 NOTE — LETTER
9/6/2018    Nayeli Castorena PA-C  [address] [facility] [address]    RE: Kavitha Headley       Dear Colleague,    I had the pleasure of seeing Kavitha Headley in the [facility].      Clinic visit note dictated. Dictation reference number - [id number]    Thank you for allowing me to participate in the care of your patient.      Sincerely,     Herman Ugarte MD     [facility]    cc:   Nayeli Castorena PA-C  [address] [place] [address]

## 2018-09-06 NOTE — MR AVS SNAPSHOT
After Visit Summary   9/6/2018    Kavihta Headley    MRN: [id number]           Patient Information     Date Of Birth          1945        Visit Information        Provider Department      9/6/2018 11:15 AM Herman Ugarte MD [facility]        Today's Diagnoses     Nonrheumatic aortic valve stenosis    -  1    Sinus tachycardia          Care Instructions    MEDICATION CHANGES:  1.  Start a beta-blocker called atenolol 25 mg.  Take 1 tablet in the morning.    ADDITIONAL TESTING AND FOLLOW-UP:  1.  Heart ultrasound (transthoracic echocardiogram).  2.  Follow-up in clinic to review test results.    If you have any questions or concerns, please call my nurse Mile Mcgee at [phone].            Follow-ups after your visit        Additional Services     Follow-Up with Cardiologist                 Your next 10 appointments already scheduled     Sep 10, 2018  3:20 PM CDT   Return Visit with Nahum Pa MD   [facility] ([facility])    [facility]  [address]  [facility] [address] [phone]            Sep 11, 2018  4:30 PM CDT   CT ABDOMEN PELVIS W CONTRAST with SHCT2   [facility] CT ([facility])    [address] [facility] [id number] [phone]           Please bring any scans or X-rays taken at other hospitals, if similar tests were done. Also bring a list of your medicines, including vitamins, minerals and over-the-counter drugs. It is safest to leave personal items at home.  Be sure to tell your doctor:   If you have any allergies.   If there s any chance you are pregnant.   If you are breastfeeding.  How to prepare:   Do not eat or drink for 2 hours before your exam. If you need to take medicine, you may take it with small sips of water. (We may ask you to take liquid medicine as well.)   Please wear loose clothing, such as a sweat suit or  jogging clothes. Avoid snaps, zippers and other metal. We may ask you to undress and put on a hospital gown.  Please arrive 30 minutes early for your CT. Once in the department you might be asked to drink water 15-20 minutes prior to your exam.  If indicated you may be asked to drink an oral contrast in advance of your CT.  If this is the case, the imaging team will let you know or be in contact with you prior to your appointment  Patients over 70 or patients with diabetes or kidney problems:   If you haven t had a blood test (creatinine test) within the last 30 days, the Cardiologist/Radiologist may require you to get this test prior to your exam.  If you have diabetes:   Continue to take your metformin medication on the day of your exam  If you have any questions, please call the Imaging Department where you will have your exam.              Future tests that were ordered for you today     Open Future Orders        Priority Expected Expires Ordered    Echocardiogram Routine 9/13/2018 9/6/2019 9/6/2018    Follow-Up with Cardiologist Routine 10/4/2018 9/6/2019 9/6/2018            Who to contact     If you have questions or need follow up information about today's clinic visit or your schedule please contact [facility] directly at [phone].  Normal or non-critical lab and imaging results will be communicated to you by StreetFirehart, letter or phone within 4 business days after the clinic has received the results. If you do not hear from us within 7 days, please contact the clinic through StreetFirehart or phone. If you have a critical or abnormal lab result, we will notify you by phone as soon as possible.  Submit refill requests through Brandfolder or call your pharmacy and they will forward the refill request to us. Please allow 3 business days for your refill to be completed.          Additional Information About Your Visit        Brandfolder Information     Brandfolder gives you secure access to  "your electronic health record. If you see a primary care provider, you can also send messages to your care team and make appointments. If you have questions, please call your primary care clinic.  If you do not have a primary care provider, please call [phone] and they will assist you.        Care EveryWhere ID     This is your Care EveryWhere ID. This could be used by other organizations to access your [facility] medical records  [id number]        Your Vitals Were     Pulse Height Pulse Oximetry BMI (Body Mass Index)          98 1.626 m (5' 4\") 95% 29.35 kg/m2         Blood Pressure from Last 3 Encounters:   09/06/18 116/80   08/28/18 126/83   08/09/18 126/90    Weight from Last 3 Encounters:   09/06/18 77.6 kg (171 lb)   08/28/18 78 kg (172 lb)   08/07/18 80 kg (176 lb 4.8 oz)              We Performed the Following     EKG 12-lead complete w/read - Clinics (performed today)          Today's Medication Changes          These changes are accurate as of 9/6/18 12:11 PM.  If you have any questions, ask your nurse or doctor.               Start taking these medicines.        Dose/Directions    atenolol 25 MG tablet   Commonly known as:  TENORMIN   Used for:  Sinus tachycardia   Started by:  Herman Ugarte MD        Dose:  25 mg   Take 1 tablet (25 mg) by mouth every morning   Quantity:  30 tablet   Refills:  3            Where to get your medicines      These medications were sent to [id number] [place] [facility] [address] [facility] [place] [address]  [address] [facility] [address] [facility] [address]     Phone:  [phone]     atenolol 25 MG tablet                Primary Care Provider Office Phone # Fax #    Nayeli Castorena PA-C [phone]       [address] [place] [address]        Equal Access to Services     PATRICIA BUSH AH: Lianne mesa Soaustyn, waaxda luqadaha, qaybta kaalmada alanyasoham, sky vides ah. So wa " [phone].    ATENCIÓN: Si sol carreon, tiene a gaytan disposición servicios gratuitos de asistencia lingüística. Chelsi espinal [phone].    We comply with applicable federal civil rights laws and [place] laws. We do not discriminate on the basis of race, color, national origin, age, disability, sex, sexual orientation, or gender identity.            Thank you!     Thank you for choosing [facility]  for your care. Our goal is always to provide you with excellent care. Hearing back from our patients is one way we can continue to improve our services. Please take a few minutes to complete the written survey that you may receive in the mail after your visit with us. Thank you!             Your Updated Medication List - Protect others around you: Learn how to safely use, store and throw away your medicines at www.disposemymeds.org.          This list is accurate as of 9/6/18 12:11 PM.  Always use your most recent med list.                   Brand Name Dispense Instructions for use Diagnosis    atenolol 25 MG tablet    TENORMIN    30 tablet    Take 1 tablet (25 mg) by mouth every morning    Sinus tachycardia       B-12 1000 MCG Tbcr     100 tablet    Take 1,000 mcg by mouth daily    Vitamin B12 deficiency       CENTRUM SILVER per tablet      Take 1 tablet by mouth daily        diphenhydrAMINE 25 MG capsule    BENADRYL     Take 25 mg by mouth nightly as needed        folic acid 1 MG tablet    FOLVITE    30 tablet    Take 1 tablet (1 mg) by mouth daily    Alcohol abuse       furosemide 20 MG tablet    LASIX    90 tablet    Take 1 tablet (20 mg) by mouth daily    Benign hypertension       gabapentin 300 MG capsule    NEURONTIN    90 capsule    Take 1 capsule (300 mg) by mouth 3 times daily    Chronic pain syndrome       ketoconazole 2 % shampoo    NIZORAL    120 mL    Apply to the affected area and wash off after 5 minutes.    Tinea capitis       magnesium oxide 400 MG tablet     MAG-OX    7 tablet    Take 1 tablet (400 mg) by mouth daily    Low magnesium levels       melatonin 3 MG tablet      Take 9 mg by mouth nightly as needed for sleep        polyethylene glycol Packet    MIRALAX/GLYCOLAX    7 packet    Take 17 g by mouth daily as needed (constipation)    Constipation, unspecified constipation type       senna-docusate 8.6-50 MG per tablet    SENOKOT-S;PERICOLACE    100 tablet    Take 1-2 tablets by mouth 2 times daily as needed for constipation    Constipation, unspecified constipation type       spironolactone 100 MG tablet    ALDACTONE    90 tablet    Take 1 tablet (100 mg) by mouth daily    Aortic dilatation (H), Paroxysmal supraventricular tachycardia (H), Benign hypertension       sucralfate 1 GM/10ML suspension    CARAFATE    1200 mL    Take 10 mLs (1 g) by mouth 4 times daily (before meals and nightly) Pt takes once daily    Esophagitis       thiamine 100 MG tablet      Take 1 tablet (100 mg) by mouth daily    Alcohol abuse       traZODone 100 MG tablet    DESYREL    90 tablet    TAKE 1 TABLET (100 MG) BY MOUTH NIGHTLY AS NEEDED FOR SLEEP    Insomnia, unspecified type       valACYclovir 1000 mg tablet    VALTREX    4 tablet    TAKE 2 TABS BY MOUTH EVERY 12 HOURS FOR 1 DAY ONLY FOR OUTBREAKS OF COLD SORES AS NEEDED    Herpes simplex virus infection       vortioxetine 10 MG tablet    BRINTELLIX    90 tablet    Take 1 tablet (10 mg) by mouth daily    Depression, unspecified depression type, Anxiety

## 2018-09-07 ENCOUNTER — TELEPHONE (OUTPATIENT)
Dept: ONCOLOGY | Facility: CLINIC | Age: 73
End: 2018-09-07

## 2018-09-07 NOTE — TELEPHONE ENCOUNTER
Message left for patient Re: patient is scheduled with Dr Pa on [place]-9-35-18  CT Scan is scheduled for  9-11-18 please reschedule MD appt so Dr Pa will have the results of CT

## 2018-09-17 DIAGNOSIS — R59.0 INGUINAL ADENOPATHY: Primary | ICD-10-CM

## 2018-09-17 NOTE — TELEPHONE ENCOUNTER
Pt called requesting new order for CT Scan be placed & that she wants to reschedule her appt for  accordingly.    Placed new order, gave Pt ph # for radiology scheduling & she will call back to schedule her exam appt with .

## 2018-09-20 ENCOUNTER — HOSPITAL ENCOUNTER (OUTPATIENT)
Dept: CT IMAGING | Facility: CLINIC | Age: 73
Discharge: HOME | End: 2018-09-20
Attending: INTERNAL MEDICINE | Admitting: INTERNAL MEDICINE
Payer: MEDICARE

## 2018-09-20 DIAGNOSIS — R59.0 INGUINAL ADENOPATHY: ICD-10-CM

## 2018-09-20 LAB
CREAT BLD-MCNC: 0.8 MG/DL (ref 0.52–1.04)
GFR SERPL CREATININE-BSD FRML MDRD: 70 ML/MIN/1.7M2

## 2018-09-20 PROCEDURE — 74177 CT ABD & PELVIS W/CONTRAST: CPT

## 2018-09-20 PROCEDURE — 25000125 ZZHC RX 250: Performed by: INTERNAL MEDICINE

## 2018-09-20 PROCEDURE — 82565 ASSAY OF CREATININE: CPT

## 2018-09-20 PROCEDURE — 25000128 H RX IP 250 OP 636: Performed by: INTERNAL MEDICINE

## 2018-09-20 RX ORDER — IOPAMIDOL 755 MG/ML
84 INJECTION, SOLUTION INTRAVASCULAR ONCE
Status: COMPLETED | OUTPATIENT
Start: 2018-09-20 | End: 2018-09-20

## 2018-09-20 RX ADMIN — IOPAMIDOL 84 ML: 755 INJECTION, SOLUTION INTRAVENOUS at 14:56

## 2018-09-20 RX ADMIN — SODIUM CHLORIDE, PRESERVATIVE FREE 64 ML: 5 INJECTION INTRAVENOUS at 14:56

## 2018-09-24 ENCOUNTER — HOSPITAL ENCOUNTER (OUTPATIENT)
Dept: MAMMOGRAPHY | Facility: CLINIC | Age: 73
Discharge: HOME | End: 2018-09-24
Attending: PHYSICIAN ASSISTANT | Admitting: PHYSICIAN ASSISTANT
Payer: MEDICARE

## 2018-09-24 DIAGNOSIS — N64.4 BREAST PAIN, RIGHT: ICD-10-CM

## 2018-09-24 PROCEDURE — G0279 TOMOSYNTHESIS, MAMMO: HCPCS

## 2018-09-24 NOTE — PROGRESS NOTES
Linda  Here are your recent results.  They are normal.  If you have any questions please do not hesitate to contact our office via phone ([phone]) or MyChart.    Nayeli Castorena MS, PACastilloC  [facility]

## 2018-09-24 NOTE — LETTER
Kavitha Headley  [address] [facility] [address]      September 24, 2018    Dear Kavitha:    Thank you for your recent visit.  Breast Imaging Result: We are pleased to inform you that the results of your recent breast imaging show no evidence of malignancy (cancer).    If you are experiencing any breast problems such as a lump or localized pain we request that you discuss this with your health care provider if you haven t already done so, as additional testing may be necessary.    As you know, early detection of cancer is very important. Although mammography is the most accurate method for early detection, not all cancers are found through mammography. A thorough examination includes a combination of mammography, physical examination and breast self-examination. Currently the American College of Radiology and the Society of Breast Imaging recommend an annual mammogram for all women beginning at the age of 40.    A report of your breast imaging results was sent to: Nayeli Castorena    Your breast imaging will become part of your medical file here at [facility] for at least 10 years. You are responsible for informing any new health care provider or breast imaging facility of the date and location of this examination.    We appreciate the opportunity to participate in your health care.    Sincerely,    Nisreen Vergara MD  Interpreting Radiologist  [facility]

## 2018-09-27 ENCOUNTER — ONCOLOGY VISIT (OUTPATIENT)
Dept: ONCOLOGY | Facility: CLINIC | Age: 73
End: 2018-09-27
Attending: INTERNAL MEDICINE
Payer: COMMERCIAL

## 2018-09-27 VITALS
BODY MASS INDEX: 28.73 KG/M2 | HEART RATE: 91 BPM | RESPIRATION RATE: 16 BRPM | TEMPERATURE: 98.2 F | OXYGEN SATURATION: 96 % | SYSTOLIC BLOOD PRESSURE: 127 MMHG | DIASTOLIC BLOOD PRESSURE: 84 MMHG | WEIGHT: 167.4 LBS

## 2018-09-27 DIAGNOSIS — D69.6 THROMBOCYTOPENIA (H): Primary | ICD-10-CM

## 2018-09-27 PROCEDURE — G0463 HOSPITAL OUTPT CLINIC VISIT: HCPCS

## 2018-09-27 PROCEDURE — 99214 OFFICE O/P EST MOD 30 MIN: CPT | Performed by: INTERNAL MEDICINE

## 2018-09-27 ASSESSMENT — PAIN SCALES - GENERAL: PAINLEVEL: NO PAIN (0)

## 2018-09-27 NOTE — MR AVS SNAPSHOT
After Visit Summary   9/27/2018    Kavitha Headley    MRN: [id number]           Patient Information     Date Of Birth          1945        Visit Information        Provider Department      9/27/2018 2:20 PM Nahum Pa MD [facility]        Today's Diagnoses     Thrombocytopenia (H)    -  1      Care Instructions    Follow up with PCP.    MD discharged          Follow-ups after your visit        Your next 10 appointments already scheduled     Oct 26, 2018  3:15 PM CDT   Return Visit with Herman Ugarte MD   [facility] ([facility])    [address] [place], [place] [id number]  [facility] [address] [phone] OPT 2              Who to contact     If you have questions or need follow up information about today's clinic visit or your schedule please contact [facility] directly at [phone].  Normal or non-critical lab and imaging results will be communicated to you by MyChart, letter or phone within 4 business days after the clinic has received the results. If you do not hear from us within 7 days, please contact the clinic through [employer] or phone. If you have a critical or abnormal lab result, we will notify you by phone as soon as possible.  Submit refill requests through [employer] or call your pharmacy and they will forward the refill request to us. Please allow 3 business days for your refill to be completed.          Additional Information About Your Visit        MyChart Information     [employer] gives you secure access to your electronic health record. If you see a primary care provider, you can also send messages to your care team and make appointments. If you have questions, please call your primary care clinic.  If you do not have a primary care provider, please call [phone] and they will assist you.        Care EveryWhere ID     This is your Care EveryWhere ID. This could be used by other organizations to  access your [facility] medical records  [id number]        Your Vitals Were     Pulse Temperature Respirations Pulse Oximetry BMI (Body Mass Index)       91 98.2  F (36.8  C) (Oral) 16 96% 28.73 kg/m2        Blood Pressure from Last 3 Encounters:   09/27/18 127/84   09/06/18 116/80   08/28/18 126/83    Weight from Last 3 Encounters:   09/27/18 75.9 kg (167 lb 6.4 oz)   09/06/18 77.6 kg (171 lb)   08/28/18 78 kg (172 lb)              Today, you had the following     No orders found for display       Primary Care Provider Office Phone # Fax #    Nayeli Castorena PA-C [phone]       [address] [place] [address]        Equal Access to Services     MACIEJ BUSH : Hadii aad ku hadasho Soaustyn, waaxda luqadaha, qaybta kaalmada bradley, sky vides . So [facility] [phone].    ATENCIÓN: Si habla español, tiene a gaytan disposición servicios gratuitos de asistencia lingüística. [facility] [phone].    We comply with applicable federal civil rights laws and [place] laws. We do not discriminate on the basis of race, color, national origin, age, disability, sex, sexual orientation, or gender identity.            Thank you!     Thank you for choosing [facility]  for your care. Our goal is always to provide you with excellent care. Hearing back from our patients is one way we can continue to improve our services. Please take a few minutes to complete the written survey that you may receive in the mail after your visit with us. Thank you!             Your Updated Medication List - Protect others around you: Learn how to safely use, store and throw away your medicines at www.disposemymeds.org.          This list is accurate as of 9/27/18 11:59 PM.  Always use your most recent med list.                   Brand Name Dispense Instructions for use Diagnosis    atenolol 25 MG tablet    TENORMIN    30 tablet    Take 1 tablet (25 mg) by mouth every morning     Sinus tachycardia       B-12 1000 MCG Tbcr     100 tablet    Take 1,000 mcg by mouth daily    Vitamin B12 deficiency       CENTRUM SILVER per tablet      Take 1 tablet by mouth daily        diphenhydrAMINE 25 MG capsule    BENADRYL     Take 25 mg by mouth nightly as needed        folic acid 1 MG tablet    FOLVITE    30 tablet    Take 1 tablet (1 mg) by mouth daily    Alcohol abuse       furosemide 20 MG tablet    LASIX    90 tablet    Take 1 tablet (20 mg) by mouth daily    Benign hypertension       gabapentin 300 MG capsule    NEURONTIN    90 capsule    Take 1 capsule (300 mg) by mouth 3 times daily    Chronic pain syndrome       ketoconazole 2 % shampoo    NIZORAL    120 mL    Apply to the affected area and wash off after 5 minutes.    Tinea capitis       magnesium oxide 400 MG tablet    MAG-OX    7 tablet    Take 1 tablet (400 mg) by mouth daily    Low magnesium levels       melatonin 3 MG tablet      Take 9 mg by mouth nightly as needed for sleep        polyethylene glycol Packet    MIRALAX/GLYCOLAX    7 packet    Take 17 g by mouth daily as needed (constipation)    Constipation, unspecified constipation type       senna-docusate 8.6-50 MG per tablet    SENOKOT-S;PERICOLACE    100 tablet    Take 1-2 tablets by mouth 2 times daily as needed for constipation    Constipation, unspecified constipation type       spironolactone 100 MG tablet    ALDACTONE    90 tablet    Take 1 tablet (100 mg) by mouth daily    Aortic dilatation (H), Paroxysmal supraventricular tachycardia (H), Benign hypertension       sucralfate 1 GM/10ML suspension    CARAFATE    1200 mL    Take 10 mLs (1 g) by mouth 4 times daily (before meals and nightly) Pt takes once daily    Esophagitis       thiamine 100 MG tablet      Take 1 tablet (100 mg) by mouth daily    Alcohol abuse       traZODone 100 MG tablet    DESYREL    90 tablet    TAKE 1 TABLET (100 MG) BY MOUTH NIGHTLY AS NEEDED FOR SLEEP    Insomnia, unspecified type       valACYclovir 1000  mg tablet    VALTREX    4 tablet    TAKE 2 TABS BY MOUTH EVERY 12 HOURS FOR 1 DAY ONLY FOR OUTBREAKS OF COLD SORES AS NEEDED    Herpes simplex virus infection       vortioxetine 10 MG tablet    BRINTELLIX    90 tablet    Take 1 tablet (10 mg) by mouth daily    Depression, unspecified depression type, Anxiety

## 2018-09-27 NOTE — PROGRESS NOTES
"Oncology Rooming Note    September 27, 2018 2:36 PM   Kavitha Headley is a 72 year old female who presents for:    Chief Complaint   Patient presents with     Oncology Clinic Visit     Thrombocytopenia      Initial Vitals: /84 (BP Location: Left arm, Patient Position: Sitting, Cuff Size: Adult Regular)  Pulse 91  Temp 98.2  F (36.8  C) (Oral)  Resp 16  Wt 75.9 kg (167 lb 6.4 oz)  SpO2 96%  BMI 28.73 kg/m2 Estimated body mass index is 28.73 kg/(m^2) as calculated from the following:    Height as of 9/6/18: 1.626 m (5' 4\").    Weight as of this encounter: 75.9 kg (167 lb 6.4 oz). Body surface area is 1.85 meters squared.  No Pain (0) Comment: Data Unavailable   No LMP recorded. Patient has had a hysterectomy.  Allergies reviewed: Yes  Medications reviewed: Yes    Medications: Medication refills not needed today.  Pharmacy name entered into EPIC:    ROMERO MATHIS  [id number] - [place] - [address] AT [address] [place] [address] [facility] [id number] [place] - [address] AT [place] [address] & [facility] [id number] - [place] - [address] AT [facility] OF [address]    Clinical concerns: None                  4 minutes for nursing intake (face to face time)     Monica Hanna MA            "

## 2018-09-27 NOTE — LETTER
"    9/27/2018         RE: Kavitha Headley  [address] [facility] [address]        Dear Colleague,    Thank you for referring your patient, Kavitha Headley, to the [facility]. Please see a copy of my visit note below.    Oncology Rooming Note    September 27, 2018 2:36 PM   Kavitha Headley is a 72 year old female who presents for:    Chief Complaint   Patient presents with     Oncology Clinic Visit     Thrombocytopenia      Initial Vitals: /84 (BP Location: Left arm, Patient Position: Sitting, Cuff Size: Adult Regular)  Pulse 91  Temp 98.2  F (36.8  C) (Oral)  Resp 16  Wt 75.9 kg (167 lb 6.4 oz)  SpO2 96%  BMI 28.73 kg/m2 Estimated body mass index is 28.73 kg/(m^2) as calculated from the following:    Height as of 9/6/18: 1.626 m (5' 4\").    Weight as of this encounter: 75.9 kg (167 lb 6.4 oz). Body surface area is 1.85 meters squared.  No Pain (0) Comment: Data Unavailable   No LMP recorded. Patient has had a hysterectomy.  Allergies reviewed: Yes  Medications reviewed: Yes    Medications: Medication refills not needed today.  Pharmacy name entered into EPIC:    ROMERO MATHIS  [id number] - [place] - [address] [facility]. AT [address] [facility] [place] [address] [facility] [id number]  [place] [address] [facility]ORE [address]  [place] - [address] AT [facility] OF [address]    Clinical concerns: None                  4 minutes for nursing intake (face to face time)     Monica Hanna MA              Visit Date:   09/27/2018      SUBJECTIVE:  Ms. Headley is a 72-year-old female who follows up in Hematology Clinic for thrombocytopenia.  Her thrombocytopenia was secondary to alcohol abuse.  The patient had quit alcohol.  The patient says that now she has slipped back and had some alcohol.  The patient's platelet " count is still normal.  CBC on 08/03/2018 reveals normal WBC, hemoglobin and platelets.  MCV mildly elevated from alcohol use.      The patient also has fatty liver and prominent inguinal lymphadenopathy.  A followup CT scan was recommended.  CT of the abdomen and pelvis on 09/20/2018 reveals mildly generous but normal morphology lymph nodes in both inguinal regions.  They are compatible with reactive lymph node.  There is again fatty liver.      REVIEW OF SYSTEMS:  The patient overall is doing good.  No headache.  No dizziness.  No chest pain.  No difficulty breathing.  No abdominal pain, nausea or vomiting.  No urinary or bowel complaints.  No bleeding.  No fever, chills or night sweats.  Appetite overall has been good.      PHYSICAL EXAMINATION:   GENERAL:  She is alert, oriented x 3.   VITAL SIGNS:  Reviewed.  ECOG PS of 1.   The rest of the system not examined.      LABORATORY DATA:  Reviewed.      ASSESSMENT:   1.  A 72-year-old female with thrombocytopenia secondary to alcohol abuse.  Thrombocytopenia has resolved.   2.  A reactive inguinal lymphadenopathy.   3.  Alcohol abuse.   4.  Fatty liver.      PLAN:   1.  CT scan was reviewed with the patient.  I explained to her that there is no pathological lymphadenopathy.  She has reactive inguinal lymphadenopathy.  No further workup needed.  This has been stable for a year.   2.  Discussed regarding fatty liver.  Advised her to quit alcohol.  Advised her to diet, exercise and lose weight.   3.  CBC was reviewed.  I explained to her that WBC, hemoglobin and platelets are all normal.  MCV mildly elevated due to alcohol abuse.  I advised her to quit alcohol.  I told the patient as long as she does not drink,  her CBC will remain normal.   4.  She had a few questions, which were all answered.  She will follow up with her primary care physician.  No return appointment being made for Hematology/Oncology Clinic.  Advised her to see a physician if she has any new lump,  worsening weakness, bleeding, shortness of breath, recurrent vomiting or any other concerns.      Total of 25 minutes.         CHRISTIE DAY MD             D: 2018   T: 2018   MT: RHIANNA      Name:     DARWIN JASSO   MRN:      [id number]        Account:      [id number]   :      1945           Visit Date:   2018      Document: [id number]       Again, thank you for allowing me to participate in the care of your patient.        Sincerely,        Christie Day MD

## 2018-09-28 NOTE — PROGRESS NOTES
Visit Date:   09/27/2018     HEMATOLOGY HISTORY: Ms. Headley is a 71 year old female with thrombocytopenia.  1.  On 06/06/2017, platelet of 217.  Platelets started to progressively decrease.  It decreased to 51 on 06/12/2017.  WBC has remained normal.  Hemoglobin decreased slightly to 11.3.   -On 06/06/2017, elevated AST, ALT, alkaline phosphatase and bilirubin.   2.  Ultrasound abdomen on 06/07/2017 revealed fatty infiltration of the liver.    3. Multiple labs done in June 2017.  -Hepatitis B and C negative.   -Vitamin B12 is normal  -Folate is normal.  3. CT abdomen and pelvis on 06/14/2017 revealed diffuse fatty infiltration of liver. Spleen size is normal.  There are scattered bilateral inguinal lymph node.  Largest is 2.6 cm.  It appears to have a fatty hilum.  Probably benign.  4. CT abdomen and pelvis on 09/25/2017 reveals mildly enlarged bilateral inguinal lymph node.  They have normal fatty leilani. Hepatic steatosis has resolved. (patient used to drink significant amount of alcohol. She decreased alcohol intake).  5. On 08/03/2018, normal WBC, Hgb and platelet.     SUBJECTIVE:  Ms. Headley is a 72-year-old female who follows up in Hematology Clinic for thrombocytopenia.  Her thrombocytopenia was secondary to alcohol abuse. Patient had quit alcohol. Patient says that now she has slipped back and had some alcohol. Platelet count is still normal.  CBC on 08/03/2018 reveals normal WBC, hemoglobin and platelets.  MCV mildly elevated from alcohol use.      Patient also has fatty liver and prominent inguinal lymphadenopathy.  A followup CT scan was recommended.  CT of the abdomen and pelvis on 09/20/2018 reveals mildly generous but normal morphology lymph nodes in both inguinal regions.  They are compatible with reactive lymph node.  There is again fatty liver.      REVIEW OF SYSTEMS:    Patient overall is doing good.  No headache.  No dizziness.  No chest pain.  No difficulty breathing.  No abdominal pain, nausea or  vomiting.  No urinary or bowel complaints.  No bleeding.  No fever, chills or night sweats.  Appetite overall has been good.      PHYSICAL EXAMINATION:   GENERAL:  She is alert, oriented x 3.   VITAL SIGNS:  Reviewed.  ECOG PS of 1.   Rest of the system not examined.      LABORATORY DATA:  Reviewed.      ASSESSMENT:   1.  A 72-year-old female with thrombocytopenia secondary to alcohol abuse.  Thrombocytopenia has resolved.   2.  Reactive inguinal lymphadenopathy.   3.  Alcohol abuse.   4.  Fatty liver.      PLAN:   1.  CT scan was reviewed with the patient.  I explained to her that there is no pathological lymphadenopathy.  She has reactive inguinal lymphadenopathy.  No further workup needed.  This has been stable for a year.   2.  Discussed regarding fatty liver.  Advised her to quit alcohol.  Advised her to diet, exercise and lose weight.   3.  CBC was reviewed.  I explained to her that WBC, hemoglobin and platelets are all normal.  MCV mildly elevated due to alcohol abuse.  I advised her to quit alcohol.  I told the patient as long as she does not drink,  her CBC will remain normal.   4.  She had a few questions, which were all answered.  She will follow up with her primary care physician.  No return appointment being made for Hematology/Oncology Clinic.  Advised her to see a physician if she has any new lump, worsening weakness, bleeding, shortness of breath, recurrent vomiting or any other concerns.      Total face to face time spent 25 minutes, more than 50% time spent in counseling.         CHRISTIE DAY MD             D: 2018   T: 2018   MT: RHIANNA      Name:     DARWIN JASSO   MRN:      [id number]        Account:      [id number]   :      1945           Visit Date:   2018      Document: [id number]

## 2018-10-10 DIAGNOSIS — G89.4 CHRONIC PAIN SYNDROME: ICD-10-CM

## 2018-10-10 RX ORDER — GABAPENTIN 300 MG/1
CAPSULE ORAL
Qty: 90 CAPSULE | Refills: 0 | Status: SHIPPED | OUTPATIENT
Start: 2018-10-10 | End: 2018-11-14

## 2018-10-10 NOTE — TELEPHONE ENCOUNTER
Gabapentin 300mg      Last Written Prescription Date:  8/16/18  Last Fill Quantity: 90,   # refills: 1  Last Office Visit: Raffaele 8/28/18  Future Office visit:    Next 5 appointments (look out 90 days)     Oct 26, 2018  3:15 PM CDT   Return Visit with Herman Ugarte MD   [facility] ([facility])    [address] [place] [id number] [phone] OPT 2                   Routing refill request to provider for review/approval because:  Drug not on the [facility], [facility] or [facility] refill protocol or controlled substance    [facility]

## 2018-10-16 PROBLEM — I25.10 CORONARY ARTERY DISEASE INVOLVING NATIVE CORONARY ARTERY OF NATIVE HEART WITHOUT ANGINA PECTORIS: Status: ACTIVE | Noted: 2018-10-16

## 2018-10-22 ENCOUNTER — TELEPHONE (OUTPATIENT)
Dept: CARDIOLOGY | Facility: CLINIC | Age: 73
End: 2018-10-22

## 2018-10-22 NOTE — TELEPHONE ENCOUNTER
Patient called to re-schedule echo prior to OV 10/26/2018 with Dr Ugarte. Patient's voicemail box was full, unable to leave message. Will try again later today.

## 2018-10-22 NOTE — TELEPHONE ENCOUNTER
Patient's daughter called regarding contacting the patient. No answer, left voicemail instructing the daughter to call our nurse line or to call scheduling directly to make echo appointment.

## 2018-11-08 ENCOUNTER — HOSPITAL ENCOUNTER (OUTPATIENT)
Dept: CARDIOLOGY | Facility: CLINIC | Age: 73
Discharge: HOME | End: 2018-11-08
Attending: INTERNAL MEDICINE | Admitting: INTERNAL MEDICINE
Payer: MEDICARE

## 2018-11-08 DIAGNOSIS — I35.0 NONRHEUMATIC AORTIC VALVE STENOSIS: ICD-10-CM

## 2018-11-08 DIAGNOSIS — R00.0 SINUS TACHYCARDIA: ICD-10-CM

## 2018-11-08 PROCEDURE — 93306 TTE W/DOPPLER COMPLETE: CPT

## 2018-11-08 PROCEDURE — 93306 TTE W/DOPPLER COMPLETE: CPT | Mod: 26 | Performed by: INTERNAL MEDICINE

## 2018-11-12 ENCOUNTER — DOCUMENTATION ONLY (OUTPATIENT)
Dept: CARDIOLOGY | Facility: CLINIC | Age: 73
End: 2018-11-12

## 2018-11-12 NOTE — PROGRESS NOTES
Echo 11/9/18 noted, patient canceled OV 11/9/18 due to back issues.  Echo:Compared to the echo dated 8-11-17, both the mean and  peak gradients have increase modestely and the aortic valve area has decreased  from 1.2 cm2 => 1.0 cm2.  Will message Dr. Ugarte to see if TARUN visit is an option.    Reply from Dr. Ugarte: We will see her as scheduled on 10 December 2018.

## 2018-11-14 DIAGNOSIS — Z00.00 PREVENTATIVE HEALTH CARE: Primary | ICD-10-CM

## 2018-11-14 DIAGNOSIS — G89.4 CHRONIC PAIN SYNDROME: ICD-10-CM

## 2018-11-14 RX ORDER — GABAPENTIN 300 MG/1
CAPSULE ORAL
Qty: 90 CAPSULE | Refills: 3 | Status: SHIPPED | OUTPATIENT
Start: 2018-11-14 | End: 2019-05-15

## 2018-11-14 NOTE — TELEPHONE ENCOUNTER
Requested Prescriptions   Pending Prescriptions Disp Refills     gabapentin (NEURONTIN) 300 MG capsule [Pharmacy Med Name: GABAPENTIN 300 MG CAPSULE] 90 capsule 0     Sig: TAKE 1 CAPSULE BY MOUTH THREE TIMES A DAY    There is no refill protocol information for this order     Last Written Prescription Date:  10/10/2018  Last Fill Quantity: 90,  # refills: 0   Last office visit: 8/28/2018 with prescribing provider:  CRISTIN Castorena  Future Office Visit:   Next 5 appointments (look out 90 days)     Dec 10, 2018 10:15 AM CST   Return Visit with Herman Ugarte MD   [facility] ([facility])    [address] [place] [id number] [phone] OPT 2

## 2018-11-14 NOTE — TELEPHONE ENCOUNTER
Routing refill request to provider for review/approval because:  Drug not on the [facility] refill protocol   Mya Lyle RN

## 2018-11-25 DIAGNOSIS — G89.4 CHRONIC PAIN SYNDROME: ICD-10-CM

## 2018-11-26 RX ORDER — BACLOFEN 10 MG/1
TABLET ORAL
Qty: 30 TABLET | Refills: 0 | Status: SHIPPED | OUTPATIENT
Start: 2018-11-26 | End: 2018-12-24

## 2018-11-26 NOTE — TELEPHONE ENCOUNTER
Last Written Prescription Date:  4/4/13  Last Fill Quantity: 90,  # refills: 2   Last office visit: 8/28/2018 with prescribing provider:     Future Office Visit:   Next 5 appointments (look out 90 days)     Dec 10, 2018 10:15 AM CST   Return Visit with Herman Ugarte MD   [facility] ([facility])    [address] [place] [id number] [phone] OPT 2                 Requested Prescriptions   Pending Prescriptions Disp Refills     baclofen (LIORESAL) 10 MG tablet [Pharmacy Med Name: BACLOFEN 10 MG TABLET] 30 tablet 0     Sig: TAKE 0.5 TABLET (5 MG) BY MOUTH 2 TIMES DAILY AS NEEDED FOR MUSCLE SPASMS    There is no refill protocol information for this order

## 2018-12-01 DIAGNOSIS — I10 BENIGN HYPERTENSION: ICD-10-CM

## 2018-12-03 RX ORDER — FUROSEMIDE 20 MG
TABLET ORAL
Qty: 90 TABLET | Refills: 0 | Status: SHIPPED | OUTPATIENT
Start: 2018-12-03 | End: 2018-12-10

## 2018-12-03 NOTE — TELEPHONE ENCOUNTER
"Requested Prescriptions   Pending Prescriptions Disp Refills     furosemide (LASIX) 20 MG tablet [Pharmacy Med Name: FUROSEMIDE 20 MG TABLET] 90 tablet 0        Last Written Prescription Date:  8.28.18  Last Fill Quantity: 90,  # refills: 0   Last Office Visit: 8/28/2018   Future Office Visit:    Next 5 appointments (look out 90 days)     Dec 10, 2018 10:15 AM CST   Return Visit with Herman Ugarte MD   [facility] ([facility])    [address] [place] [id number] [phone] OPT 2                  Sig: TAKE 1 TABLET BY MOUTH EVERY DAY    Diuretics (Including Combos) Protocol Passed    12/3/2018  8:10 AM       Passed - Blood pressure under 140/90 in past 12 months    BP Readings from Last 3 Encounters:   09/27/18 127/84   09/06/18 116/80   08/28/18 126/83                Passed - Recent (12 mo) or future (30 days) visit within the authorizing provider's specialty    Patient had office visit in the last 12 months or has a visit in the next 30 days with authorizing provider or within the authorizing provider's specialty.  See \"Patient Info\" tab in inbasket, or \"Choose Columns\" in Meds & Orders section of the refill encounter.             Passed - Patient is age 18 or older       Passed - No active pregancy on record       Passed - Normal serum creatinine on file in past 12 months    Recent Labs   Lab Test  08/04/18   0900   CR  0.72             Passed - Normal serum potassium on file in past 12 months    Recent Labs   Lab Test  08/08/18   1005   POTASSIUM  4.2                   Passed - Normal serum sodium on file in past 12 months    Recent Labs   Lab Test  08/04/18   0900   NA  141             Passed - No positive pregnancy test in past 12 months          "

## 2018-12-04 NOTE — TELEPHONE ENCOUNTER
Please advise on refill due to LOV notes.   Merlene Ibarra RN  [facility]

## 2018-12-10 ENCOUNTER — DOCUMENTATION ONLY (OUTPATIENT)
Dept: CARDIOLOGY | Facility: CLINIC | Age: 73
End: 2018-12-10

## 2018-12-10 ENCOUNTER — OFFICE VISIT (OUTPATIENT)
Dept: CARDIOLOGY | Facility: CLINIC | Age: 73
End: 2018-12-10
Payer: COMMERCIAL

## 2018-12-10 VITALS
SYSTOLIC BLOOD PRESSURE: 100 MMHG | BODY MASS INDEX: 28.46 KG/M2 | WEIGHT: 166.7 LBS | HEART RATE: 65 BPM | HEIGHT: 64 IN | OXYGEN SATURATION: 95 % | DIASTOLIC BLOOD PRESSURE: 60 MMHG

## 2018-12-10 DIAGNOSIS — I35.9 NONRHEUMATIC AORTIC VALVE DISORDER: ICD-10-CM

## 2018-12-10 DIAGNOSIS — I35.8 OTHER NONRHEUMATIC AORTIC VALVE DISORDERS: ICD-10-CM

## 2018-12-10 DIAGNOSIS — I25.3 ANEURYSM OF HEART WALL: ICD-10-CM

## 2018-12-10 DIAGNOSIS — F10.10 EXCESSIVE CONSUMPTION OF ETHANOL: ICD-10-CM

## 2018-12-10 DIAGNOSIS — I25.10 CORONARY ARTERY DISEASE INVOLVING NATIVE CORONARY ARTERY OF NATIVE HEART WITHOUT ANGINA PECTORIS: ICD-10-CM

## 2018-12-10 DIAGNOSIS — H66.90 AOM (ACUTE OTITIS MEDIA): ICD-10-CM

## 2018-12-10 DIAGNOSIS — I35.0 AORTIC STENOSIS: ICD-10-CM

## 2018-12-10 DIAGNOSIS — I38 HEART VALVE DISEASE: ICD-10-CM

## 2018-12-10 DIAGNOSIS — I35.0 NONRHEUMATIC AORTIC VALVE STENOSIS: Primary | ICD-10-CM

## 2018-12-10 DIAGNOSIS — I35.9 AORTIC VALVE DISEASE: ICD-10-CM

## 2018-12-10 DIAGNOSIS — I77.810 MILD ASCENDING AORTA DILATATION (H): ICD-10-CM

## 2018-12-10 PROCEDURE — 99214 OFFICE O/P EST MOD 30 MIN: CPT | Performed by: INTERNAL MEDICINE

## 2018-12-10 RX ORDER — FUROSEMIDE 20 MG
20 TABLET ORAL PRN
Qty: 30 TABLET | Refills: 4 | COMMUNITY
Start: 2018-12-10 | End: 2019-12-04

## 2018-12-10 RX ORDER — ATENOLOL 25 MG/1
25 TABLET ORAL EVERY MORNING
Qty: 100 TABLET | Refills: 4 | Status: SHIPPED | OUTPATIENT
Start: 2018-12-10 | End: 2019-06-26

## 2018-12-10 ASSESSMENT — MIFFLIN-ST. JEOR: SCORE: 1236.15

## 2018-12-10 NOTE — PROGRESS NOTES
Clinic visit note dictated. Dictation reference number - [id number]      REVIEW OF SYSTEMS:  A comprehensive 10-point review of systems was completed and the pertinent positives are documented in the history of present illness.    Skin:  Negative     Eyes:  Positive for glasses  ENT:  Negative    Respiratory:  Negative    Cardiovascular:    palpitations;Positive for  Gastroenterology: Negative    Genitourinary:  Negative    Musculoskeletal:  Positive for arthritis  Neurologic:  Negative    Psychiatric:  Positive for sleep disturbances(4-5 hrs per night)  Heme/Lymph/Imm:  Positive for    Endocrine:  Negative      CURRENT MEDICATIONS:  Current Outpatient Medications   Medication Sig Dispense Refill     atenolol (TENORMIN) 25 MG tablet Take 1 tablet (25 mg) by mouth every morning 100 tablet 4     baclofen (LIORESAL) 10 MG tablet TAKE 0.5 TABLET (5 MG) BY MOUTH 2 TIMES DAILY AS NEEDED FOR MUSCLE SPASMS 30 tablet 0     Cyanocobalamin (B-12) 1000 MCG TBCR Take 1,000 mcg by mouth daily 100 tablet 1     diphenhydrAMINE (BENADRYL) 25 MG capsule Take 25 mg by mouth nightly as needed       folic acid (FOLVITE) 1 MG tablet Take 1 tablet (1 mg) by mouth daily 30 tablet      furosemide (LASIX) 20 MG tablet Take 1 tablet (20 mg) by mouth as needed (leg edema) 30 tablet 4     gabapentin (NEURONTIN) 300 MG capsule TAKE 1 CAPSULE BY MOUTH THREE TIMES A DAY 90 capsule 3     ketoconazole (NIZORAL) 2 % shampoo Apply to the affected area and wash off after 5 minutes. 120 mL 1     magnesium oxide (MAG-OX) 400 MG tablet Take 1 tablet (400 mg) by mouth daily 7 tablet      melatonin 3 MG tablet Take 9 mg by mouth nightly as needed for sleep        Multiple Vitamins-Minerals (CENTRUM SILVER) per tablet Take 1 tablet by mouth daily       polyethylene glycol (MIRALAX/GLYCOLAX) Packet Take 17 g by mouth daily as needed (constipation) 7 packet      senna-docusate (SENOKOT-S;PERICOLACE) 8.6-50 MG per tablet Take 1-2 tablets by mouth 2 times daily as  needed for constipation 100 tablet      spironolactone (ALDACTONE) 100 MG tablet Take 1 tablet (100 mg) by mouth daily 90 tablet 1     STATIN NOT PRESCRIBED, INTENTIONAL, Please choose reason not prescribed, below       sucralfate (CARAFATE) 1 GM/10ML suspension Take 10 mLs (1 g) by mouth 4 times daily (before meals and nightly) Pt takes once daily 1200 mL 1     thiamine 100 MG tablet Take 1 tablet (100 mg) by mouth daily       traZODone (DESYREL) 100 MG tablet TAKE 1 TABLET (100 MG) BY MOUTH NIGHTLY AS NEEDED FOR SLEEP 90 tablet 1     valACYclovir (VALTREX) 1000 mg tablet TAKE 2 TABS BY MOUTH EVERY 12 HOURS FOR 1 DAY ONLY FOR OUTBREAKS OF COLD SORES AS NEEDED 4 tablet 2     vortioxetine (BRINTELLIX) 10 MG tablet Take 1 tablet (10 mg) by mouth daily 90 tablet 0         ALLERGIES:  Allergies   Allergen Reactions     Bactrim [Sulfamethoxazole W/Trimethoprim] Hives     Codeine Itching     NAUSEA     Morphine Itching     NAUSEA       PAST MEDICAL HISTORY:    Past Medical History:   Diagnosis Date     Alcohol abuse     long term alcohol abuse     Anxiety disorder      Ascending aorta dilatation (H)      Bariatric surgery status 1996?    gastric bypass, [facility] and     Benign hypertension      Chronic insomnia      Chronic pain syndrome     Chronic back and neck pain, chronic pain due to osteoarthritis multiple joints     Coronary artery disease 9/2015    mild distal branch disease on cath     Coronary artery disease involving native coronary artery of native heart without angina pectoris 10/16/2018    Minimal coronary artery disease on coronary angiogram in 2015.      Hip joint replacement status 4/2004    right     Knee joint replacement status 12/2005    left     Macrocytic anemia     Mild macrocytic anemia, 2012 to present, likely based on alcohol abuse.     Major depressive disorder, single episode, severe, without mention of psychotic behavior      Mixed hyperlipidemia      Moderate aortic stenosis 5/2014     mild/mod AS with peak gradient 33/mean gradient 20 mmHg, AV area 1.2     Pelvic relaxation disorder     Surgical intervention for cystocele/rectocele 3,11/2012     Personal history of urinary calculi 6/2006    left ureteral stone,lithotripsy     PVC (premature ventricular contraction)      Sinus tachycardia      Stage III chronic kidney disease (H) 2005     SVT (supraventricular tachycardia) (H)     likely atrial tachycardia       PAST SURGICAL HISTORY:    Past Surgical History:   Procedure Laterality Date     APPENDECTOMY  3/2004    incidental     C GASTRIC BYPASS,OBESE<100CM ARIANNA-EN-Y  1996     C MEDIASTINOSCOPY W OR WO BIOPSY  2/2008    Videomediastinoscopy and, for mediastinal adenopathy -reactive lymphoid hyperplasia     C REPAIR OF RECTOCELE  3/2012     C TOTAL KNEE ARTHROPLASTY  12/2005    left      CARPAL TUNNEL RELEASE RT/LT  10/2010    Carpometacarpal excisional arthroplasty with a fascial autograft and APL suspension sling (61506). 2. Left thumb metacarpophalangeal joint fusion with autologous bone graft (62276). 3. Left endoscopic carpal tunnel release      CHOLECYSTECTOMY, LAPOROSCOPIC  11/2010    Cholecystectomy, Laparoscopic     COLONOSCOPY N/A 9/8/2016    Procedure: COMBINED COLONOSCOPY, SINGLE OR MULTIPLE BIOPSY/POLYPECTOMY BY BIOPSY;  Surgeon: Moe Barlow MD;  Location:  GI     CYSTOCELE REPAIR  11/2012    davinc laparoscopic sacrocolpopexy, enterocele repair, lysis of adhesions, placement of retropubic mid urethral sling, cystoscopy     CYSTOSCOPY, LITHOTRIPSY, COMBINED  6/2006    Left extracorporeal shock wave lithotripsy, cystoscopy, left ureteral stent placement.     CYSTOSCOPY, REMOVE STENT(S), COMBINED  7/2006    Cystoscopy, removal of left ureteral stent, retrograde pyelography, flexible and rigid ureteroscopy and holmium laser lithotripsy, basket removal of stone fragments, ureteral stent placement.      ENDOSCOPIC ULTRASOUND UPPER GASTROINTESTINAL TRACT (GI) N/A 6/12/2017     Procedure: ENDOSCOPIC ULTRASOUND, ESOPHAGOSCOPY / UPPER GASTROINTESTINAL TRACT (GI);  ENDOSCOPIC ULTRASOUND, ESOPHAGOSCOPY / UPPER GASTROINTESTINAL TRACT (GI);  Surgeon: Parth Graham MD;  Location:  GI     HERNIA REPAIR  4/2012    bilateral augmentation mastopexy, ventral hernia repair, and medial thigh liposuction on 04/06/2012.      HYSTERECTOMY VAGINAL, BILATERAL SALPINGO-OOPHERECTOMY, COMBINED  1998    due to myoma and bleeding     JOINT REPLACEMENT, HIP RT/LT  4/2004    right total hip arthroplasty     LAPAROTOMY, LYSIS ADHESIONS, COMBINED  3/2004    lysis adhesions, ventral hernia repair, appendectomy incidentally     LYMPH NODE BIOPSY  4/2008    right axillary, reactive follicular and paracortical hyperplasia.     MAMMOPLASTY AUGMENTATION BILATERAL  4/2012     REVISE RECONSTRUCTED BREAST  6/7/2012    Left breast capsulotomy.        FAMILY HISTORY:    Family History   Problem Relation Age of Onset     Substance Abuse Father      Cancer Father         throat and lung mets     Diabetes No family hx of      Coronary Artery Disease No family hx of      Cerebrovascular Disease No family hx of        SOCIAL HISTORY:    Social History     Socioeconomic History     Marital status:      Spouse name: Mt     Number of children: 4     Years of education: 18     Highest education level: None   Social Needs     Financial resource strain: None     Food insecurity - worry: None     Food insecurity - inability: None     Transportation needs - medical: None     Transportation needs - non-medical: None   Occupational History     Occupation: nurse     Employer: [employer]     Employer: RETIRED   Tobacco Use     Smoking status: Never Smoker     Smokeless tobacco: Never Used   Substance and Sexual Activity     Alcohol use: Yes     Alcohol/week: 0.0 oz     Comment: 5 drinks per week     Drug use: No     Sexual activity: Yes     Partners: Male   Other Topics Concern      Service Not Asked     Blood  "Transfusions No     Caffeine Concern Yes     Comment: 1-2 cups per day      Occupational Exposure Yes     Comment: blood     Hobby Hazards No     Sleep Concern Yes     Stress Concern Yes     Weight Concern Yes     Comment: gastric  byepass     Special Diet No     Back Care No     Exercise Yes     Comment: walk, swin     Bike Helmet No     Seat Belt Yes     Self-Exams Yes     Parent/sibling w/ CABG, MI or angioplasty before 65F 55M? Not Asked   Social History Narrative     None       PHYSICAL EXAM:    Vitals: /60   Pulse 65   Ht 1.626 m (5' 4\")   Wt 75.6 kg (166 lb 11.2 oz)   SpO2 95%   BMI 28.61 kg/m    Wt Readings from Last 5 Encounters:   12/10/18 75.6 kg (166 lb 11.2 oz)   09/27/18 75.9 kg (167 lb 6.4 oz)   09/06/18 77.6 kg (171 lb)   08/28/18 78 kg (172 lb)   08/07/18 80 kg (176 lb 4.8 oz)       "

## 2018-12-10 NOTE — LETTER
12/10/2018      Nayeli Castorena PA-C  [address] [facility] [address]      RE: Kavitha Headley       Dear Colleague,    I had the pleasure of seeing Kavitha Headley in the [facility].    Service Date: 12/10/2018      PRIMARY CARE PROVIDER:  Nayeli Castorena PA-C      REASON FOR VISIT:  Scheduled followup of aortic valve stenosis.      HISTORY OF PRESENT ILLNESS:    Kavitha Headley is known to me from her recent visit in 09/2018.  She was previously followed by cardiologist Dr. Nayeli Bartholomew.  Linda was unaccompanied today.      She is a pleasant, 73-year-old lady (appears younger than her stated age) known to have:   1.  Moderate aortic valve stenosis.  Her repeat echocardiogram done a few days ago shows that her aortic valve stenosis may be progressing slightly.  I personally reviewed the images.  Compared to her previous echocardiogram from a year ago and at a similar LVOT diameter of 2.1 cm, her mean gradient is now 25 mmHg, and valve area has decreased from 1.2 to 1.0 cm2 (normal stroke volume index), peak transaortic velocity is 3.4 m/sec.  In other words, her aortic stenosis has progressed from moderate to moderately severe.  Fortunately, the patient remains asymptomatic.  Her LVEF is normal.  She goes to water aerobics 2 times a week and has not noticed any interval changes. I note there is no family history of aortic valve disease or aortopathy.  The patient has not had previous infective endocarditis or chest wall irradiation.    2.  Mild ascending aorta dilatation of 4.1 cm on echocardiogram.  This has remained stable over the last 2 years.  Her aortic root dimension is normal at 3.0 cm and the aortic valve is trileaflet.   3.  History of alcohol excess.  The patient has had a long history of alcohol excess. She has insight into the problem.  She candidly states that she does drink 3-4 brandies every night. She is status post previous gastric bypass surgery.    4.  Never tobacco user.   5.  Chronic mild thrombocytopenia of 173.  She saw Dr. Pa in Hematology recently, and things are stable.  No return followup appointment has been advised.      On her last visit, I had started her on low-dose atenolol 25 mg for sinus tachycardia.  She has tolerated this well.  Pulse is 65 BPM.  Hemoglobin is 12.8.      PHYSICAL EXAMINATION:   VITAL SIGNS:  /60 mmHg, pulse 65 per minute, height 5 feet 4 inches, weight 75.6 kg (166 pounds), BMI 28.7 kg/m2.   CONSTITUTIONAL:  Appears younger than stated age, no pallor or icterus.   CARDIOVASCULAR:  Her carotid pulses have a brisk upstroke and normal volume.  Jugular venous pressure is normal.  Apical impulse is undisplaced.  Heart sounds are regular.  Second heart sound is reasonably well heard.  She has a late peaking ejection systolic murmur that radiates to the left carotid.  No S4.   LUNGS:  Clear.   EXTREMITIES:  No edema.      DIAGNOSES:   1.  Moderately severe aortic valve stenosis, non-rheumatic.  Her aortic valve hemodynamics seem to be gradually worsening, but still in the moderately severe range.  However, in the last year, things have progressed.  She remains asymptomatic at her accustomed exercise.  I will plan on getting a transesophageal echocardiogram in 4 months and a CTA to assess her ascending aorta.   2.  Mild stable ascending aortic dilatation of 4.1 cm.  Follow up with a CTA in 4 months.   3.  Excess alcohol consumption.  The patient has insight into the problem, but is not contemplative of quitting or decreasing.  She has not had any previous gastroesophageal varices, and her platelets have stabilized.      FOLLOWUP:   - She will see Cardiology TARUN in 3-4 months for H&P prior to transesophageal echocardiogram.   - Transesophageal echocardiogram in 4 months.  I would like to perform this myself to assess the severity of her aortic valve stenosis.   - She will get a chest CT angigram in 4 months to assess the  rest of her thoracic aorta.   - I will see her back with the results of the above to determine timing of intervention on her aortic valve.      It was my pleasure to visit with Linda Jasso.  I look forward to seeing her again.      cc:   Nayeli Castorena PA-C   [facility]    [address] [place] [id number]         [facility]             D: 12/10/2018   T: 12/10/2018   MT: дмитрий      Name:     DARWIN JASSO   MRN:      [id number]        Account:      [id number]   :      1945           Service Date: 12/10/2018      Document: [id number]           Outpatient Encounter Medications as of 12/10/2018   Medication Sig Dispense Refill     atenolol (TENORMIN) 25 MG tablet Take 1 tablet (25 mg) by mouth every morning 100 tablet 4     baclofen (LIORESAL) 10 MG tablet TAKE 0.5 TABLET (5 MG) BY MOUTH 2 TIMES DAILY AS NEEDED FOR MUSCLE SPASMS 30 tablet 0     Cyanocobalamin (B-12) 1000 MCG TBCR Take 1,000 mcg by mouth daily 100 tablet 1     diphenhydrAMINE (BENADRYL) 25 MG capsule Take 25 mg by mouth nightly as needed       folic acid (FOLVITE) 1 MG tablet Take 1 tablet (1 mg) by mouth daily 30 tablet      furosemide (LASIX) 20 MG tablet Take 1 tablet (20 mg) by mouth as needed (leg edema) 30 tablet 4     gabapentin (NEURONTIN) 300 MG capsule TAKE 1 CAPSULE BY MOUTH THREE TIMES A DAY 90 capsule 3     ketoconazole (NIZORAL) 2 % shampoo Apply to the affected area and wash off after 5 minutes. 120 mL 1     magnesium oxide (MAG-OX) 400 MG tablet Take 1 tablet (400 mg) by mouth daily 7 tablet      melatonin 3 MG tablet Take 9 mg by mouth nightly as needed for sleep        Multiple Vitamins-Minerals (CENTRUM SILVER) per tablet Take 1 tablet by mouth daily       polyethylene glycol (MIRALAX/GLYCOLAX) Packet Take 17 g by mouth daily as needed (constipation) 7 packet      senna-docusate (SENOKOT-S;PERICOLACE) 8.6-50 MG per tablet Take 1-2 tablets by mouth 2 times daily as needed for constipation 100  tablet      spironolactone (ALDACTONE) 100 MG tablet Take 1 tablet (100 mg) by mouth daily 90 tablet 1     STATIN NOT PRESCRIBED, INTENTIONAL, Please choose reason not prescribed, below       sucralfate (CARAFATE) 1 GM/10ML suspension Take 10 mLs (1 g) by mouth 4 times daily (before meals and nightly) Pt takes once daily 1200 mL 1     thiamine 100 MG tablet Take 1 tablet (100 mg) by mouth daily       traZODone (DESYREL) 100 MG tablet TAKE 1 TABLET (100 MG) BY MOUTH NIGHTLY AS NEEDED FOR SLEEP 90 tablet 1     vortioxetine (BRINTELLIX) 10 MG tablet Take 1 tablet (10 mg) by mouth daily 90 tablet 0     [DISCONTINUED] valACYclovir (VALTREX) 1000 mg tablet TAKE 2 TABS BY MOUTH EVERY 12 HOURS FOR 1 DAY ONLY FOR OUTBREAKS OF COLD SORES AS NEEDED 4 tablet 2     [DISCONTINUED] atenolol (TENORMIN) 25 MG tablet Take 1 tablet (25 mg) by mouth every morning 30 tablet 3     [DISCONTINUED] furosemide (LASIX) 20 MG tablet TAKE 1 TABLET BY MOUTH EVERY DAY 90 tablet 0     No facility-administered encounter medications on file as of 12/10/2018.        Again, thank you for allowing me to participate in the care of your patient.      Sincerely,    Herman Ugarte MD     [facility]

## 2018-12-10 NOTE — PATIENT INSTRUCTIONS
1.  No changes to medications today.    2.  You will visit with a physician assistant (cardiology TARUN) in approximately 4 months.    3.  After the above visit, you will get a CT scan of the chest and a transesophageal echocardiogram (performed by me), as we discussed.    4.  I will plan on seeing you back to discuss the results of the above.    If you have any questions or concerns, please contact my nurses at [phone].

## 2018-12-10 NOTE — PROGRESS NOTES
Service Date: 12/10/2018      PRIMARY CARE PROVIDER:  Nayeli Castorena PA-C      REASON FOR VISIT:  Scheduled followup of aortic valve stenosis.      HISTORY OF PRESENT ILLNESS:    Kavitha Headley is known to me from her recent visit in 09/2018.  She was previously followed by cardiologist Dr. Nayeli Bartholomew.  Linda was unaccompanied today.      She is a pleasant, 73-year-old lady (appears younger than her stated age) known to have:   1.  Moderate aortic valve stenosis.  Her repeat echocardiogram done a few days ago shows that her aortic valve stenosis may be progressing slightly.  I personally reviewed the images.  Compared to her previous echocardiogram from a year ago and at a similar LVOT diameter of 2.1 cm, her mean gradient is now 25 mmHg, and valve area has decreased from 1.2 to 1.0 cm2 (normal stroke volume index), peak transaortic velocity is 3.4 m/sec.  In other words, her aortic stenosis has progressed from moderate to moderately severe.  Fortunately, the patient remains asymptomatic.  Her LVEF is normal.  She goes to water aerobics 2 times a week and has not noticed any interval changes. I note there is no family history of aortic valve disease or aortopathy.  The patient has not had previous infective endocarditis or chest wall irradiation.    2.  Mild ascending aorta dilatation of 4.1 cm on echocardiogram.  This has remained stable over the last 2 years.  Her aortic root dimension is normal at 3.0 cm and the aortic valve is trileaflet.   3.  History of alcohol excess.  The patient has had a long history of alcohol excess. She has insight into the problem.  She candidly states that she does drink 3-4 brandies every night. She is status post previous gastric bypass surgery.   4.  Never tobacco user.   5.  Chronic mild thrombocytopenia of 173.  She saw Dr. Pa in Hematology recently, and things are stable.  No return followup appointment has been advised.      On her last visit, I had started her on  low-dose atenolol 25 mg for sinus tachycardia.  She has tolerated this well.  Pulse is 65 BPM.  Hemoglobin is 12.8.      PHYSICAL EXAMINATION:   VITAL SIGNS:  /60 mmHg, pulse 65 per minute, height 5 feet 4 inches, weight 75.6 kg (166 pounds), BMI 28.7 kg/m2.   CONSTITUTIONAL:  Appears younger than stated age, no pallor or icterus.   CARDIOVASCULAR:  Her carotid pulses have a brisk upstroke and normal volume.  Jugular venous pressure is normal.  Apical impulse is undisplaced.  Heart sounds are regular.  Second heart sound is reasonably well heard.  She has a late peaking ejection systolic murmur that radiates to the left carotid.  No S4.   LUNGS:  Clear.   EXTREMITIES:  No edema.      DIAGNOSES:   1.  Moderately severe aortic valve stenosis, non-rheumatic.  Her aortic valve hemodynamics seem to be gradually worsening, but still in the moderately severe range.  However, in the last year, things have progressed.  She remains asymptomatic at her accustomed exercise.  I will plan on getting a transesophageal echocardiogram in 4 months and a CTA to assess her ascending aorta.   2.  Mild stable ascending aortic dilatation of 4.1 cm.  Follow up with a CTA in 4 months.   3.  Excess alcohol consumption.  The patient has insight into the problem, but is not contemplative of quitting or decreasing.  She has not had any previous gastroesophageal varices, and her platelets have stabilized.      FOLLOWUP:   - She will see Cardiology TARUN in 3-4 months for H&P prior to transesophageal echocardiogram.   - Transesophageal echocardiogram in 4 months.  I would like to perform this myself to assess the severity of her aortic valve stenosis.   - She will get a chest CT angigram in 4 months to assess the rest of her thoracic aorta.   - I will see her back with the results of the above to determine timing of intervention on her aortic valve.      It was my pleasure to visit with Linda Headley.  I look forward to seeing her again.       cc:   Nayeli Castorena PA-C   [facility]    [address] [place] [address]         [facility] DEDE CORTEZ MD             D: 12/10/2018   T: 12/10/2018   MT: дмитрий      Name:     DARWIN JASSO   MRN:      [id number]        Account:      [id number]   :      1945           Service Date: 12/10/2018      Document: [id number]

## 2018-12-12 DIAGNOSIS — B00.9 HERPES SIMPLEX VIRUS INFECTION: ICD-10-CM

## 2018-12-12 RX ORDER — VALACYCLOVIR HYDROCHLORIDE 1 G/1
TABLET, FILM COATED ORAL
Qty: 4 TABLET | Refills: 2 | Status: SHIPPED | OUTPATIENT
Start: 2018-12-12 | End: 2019-12-03

## 2018-12-12 NOTE — TELEPHONE ENCOUNTER
"Requested Prescriptions   Pending Prescriptions Disp Refills     valACYclovir (VALTREX) 1000 mg tablet [Pharmacy Med Name: VALACYCLOVIR HCL 1 GRAM TABLET]  Last Written Prescription Date:  8/31/18  Last Fill Quantity: 4,  # refills: 2   Last office visit: 8/28/2018 with prescribing provider:  velma   Future Office Visit:     4 tablet 2     Sig: TAKE 2 TABS BY MOUTH EVERY 12 HOURS FOR 1 DAY ONLY FOR OUTBREAKS OF COLD SORES AS NEEDED    Antivirals for Herpes Protocol Passed - 12/12/2018  3:23 PM       Passed - Patient is age 12 or older       Passed - Recent (12 mo) or future (30 days) visit within the authorizing provider's specialty    Patient had office visit in the last 12 months or has a visit in the next 30 days with authorizing provider or within the authorizing provider's specialty.  See \"Patient Info\" tab in inbasket, or \"Choose Columns\" in Meds & Orders section of the refill encounter.             Passed - Normal serum creatinine on file in past 12 months    Recent Labs   Lab Test 09/20/18  1450 08/04/18  0900   CR  --  0.72   CREAT 0.8  --                "

## 2018-12-24 DIAGNOSIS — G89.4 CHRONIC PAIN SYNDROME: ICD-10-CM

## 2018-12-24 NOTE — TELEPHONE ENCOUNTER
Requested Prescriptions   Pending Prescriptions Disp Refills     baclofen (LIORESAL) 10 MG tablet [Pharmacy Med Name: BACLOFEN 10 MG TABLET]    Last Written Prescription Date:  11.26.18  Last Fill Quantity: 30,  # refills: 0   Last Office Visit: 8/28/2018   Future Office Visit:      30 tablet 0     Sig: TAKE 0.5 TABLET (5 MG) BY MOUTH 2 TIMES DAILY AS NEEDED FOR MUSCLE SPASMS    There is no refill protocol information for this order

## 2018-12-27 RX ORDER — BACLOFEN 10 MG/1
5 TABLET ORAL 2 TIMES DAILY PRN
Qty: 90 TABLET | Refills: 0 | Status: SHIPPED | OUTPATIENT
Start: 2018-12-27 | End: 2019-02-15

## 2019-01-02 ENCOUNTER — DOCUMENTATION ONLY (OUTPATIENT)
Dept: CARDIOLOGY | Facility: CLINIC | Age: 74
End: 2019-01-02

## 2019-01-02 DIAGNOSIS — Q23.1 CONGENITAL INSUFFICIENCY OF AORTIC VALVE: ICD-10-CM

## 2019-01-02 DIAGNOSIS — I35.0 NONRHEUMATIC AORTIC VALVE STENOSIS: Primary | ICD-10-CM

## 2019-02-15 DIAGNOSIS — G89.4 CHRONIC PAIN SYNDROME: ICD-10-CM

## 2019-02-15 NOTE — TELEPHONE ENCOUNTER
baclofen (LIORESAL) 10 MG tablet  Last Written Prescription Date:  12-  Last Fill Quantity: 90 tablet,   # refills: 0  Last Office Visit: 9-6-2018  Future Office visit:       Routing refill request to provider for review/approval because:  Drug not on the [facility], [facility] or [facility] refill protocol or controlled substance

## 2019-02-15 NOTE — TELEPHONE ENCOUNTER
Last Written Prescription Date:  12/27/18  Last Fill Quantity: 90,  # refills: 0   Last office visit: 8/28/2018 with prescribing provider:     Future Office Visit:    Requested Prescriptions   Pending Prescriptions Disp Refills     baclofen (LIORESAL) 10 MG tablet [Pharmacy Med Name: BACLOFEN 10 MG TABLET] 90 tablet 0     Sig: TAKE 0.5 TABLET (5 MG) BY MOUTH 2 TIMES DAILY AS NEEDED FOR MUSCLE SPASMS    There is no refill protocol information for this order

## 2019-02-18 RX ORDER — BACLOFEN 10 MG/1
TABLET ORAL
Qty: 90 TABLET | Refills: 0 | Status: SHIPPED | OUTPATIENT
Start: 2019-02-18 | End: 2019-06-26

## 2019-03-01 ENCOUNTER — TELEPHONE (OUTPATIENT)
Dept: FAMILY MEDICINE | Facility: CLINIC | Age: 74
End: 2019-03-01

## 2019-03-01 DIAGNOSIS — I10 BENIGN HYPERTENSION: ICD-10-CM

## 2019-03-01 DIAGNOSIS — I47.10 PAROXYSMAL SUPRAVENTRICULAR TACHYCARDIA (H): ICD-10-CM

## 2019-03-01 DIAGNOSIS — I77.819 AORTIC DILATATION (H): ICD-10-CM

## 2019-03-01 RX ORDER — SPIRONOLACTONE 100 MG/1
TABLET, FILM COATED ORAL
Qty: 90 TABLET | Refills: 0 | Status: SHIPPED | OUTPATIENT
Start: 2019-03-01 | End: 2019-06-26

## 2019-03-01 NOTE — TELEPHONE ENCOUNTER
"Requested Prescriptions   Pending Prescriptions Disp Refills     spironolactone (ALDACTONE) 100 MG tablet [Pharmacy Med Name: SPIRONOLACTONE 100 MG TABLET] 90 tablet 1     Sig: TAKE 1 TABLET BY MOUTH EVERY DAY    Diuretics (Including Combos) Protocol Passed - 3/1/2019  1:24 AM       Passed - Blood pressure under 140/90 in past 12 months    BP Readings from Last 3 Encounters:   12/10/18 100/60   09/27/18 127/84   09/06/18 116/80                Passed - Recent (12 mo) or future (30 days) visit within the authorizing provider's specialty    Patient had office visit in the last 12 months or has a visit in the next 30 days with authorizing provider or within the authorizing provider's specialty.  See \"Patient Info\" tab in inbasket, or \"Choose Columns\" in Meds & Orders section of the refill encounter.             Passed - Medication is active on med list       Passed - Patient is age 18 or older       Passed - No active pregancy on record       Passed - Normal serum creatinine on file in past 12 months    Recent Labs   Lab Test 08/04/18  0900   CR 0.72             Passed - Normal serum potassium on file in past 12 months    Recent Labs   Lab Test 08/08/18  1005   POTASSIUM 4.2                   Passed - Normal serum sodium on file in past 12 months    Recent Labs   Lab Test 08/04/18  0900                Passed - No positive pregnancy test in past 12 months      Last Written Prescription Date:  8/28/2018  Last Fill Quantity: , 90 # refills:  1  Last office visit: 8/28/2018 with prescribing provider:     Future Office Visit:   Next 5 appointments (look out 90 days)    Mar 05, 2019  2:20 PM CST  Office Visit with Nayeli Castorena PA-C  [facility] [address] [place] [id number] [phone]             "

## 2019-03-02 DIAGNOSIS — I10 BENIGN HYPERTENSION: ICD-10-CM

## 2019-03-04 RX ORDER — FUROSEMIDE 20 MG
TABLET ORAL
Qty: 90 TABLET | Refills: 0 | Status: SHIPPED | OUTPATIENT
Start: 2019-03-04 | End: 2019-06-06

## 2019-03-04 NOTE — TELEPHONE ENCOUNTER
"Requested Prescriptions   Pending Prescriptions Disp Refills     furosemide (LASIX) 20 MG tablet [Pharmacy Med Name: FUROSEMIDE 20 MG TABLET] 90 tablet 0     Sig: TAKE 1 TABLET BY MOUTH EVERY DAY    Diuretics (Including Combos) Protocol Passed - 3/2/2019 12:24 AM       Passed - Blood pressure under 140/90 in past 12 months    BP Readings from Last 3 Encounters:   12/10/18 100/60   09/27/18 127/84   09/06/18 116/80                Passed - Recent (12 mo) or future (30 days) visit within the authorizing provider's specialty    Patient had office visit in the last 12 months or has a visit in the next 30 days with authorizing provider or within the authorizing provider's specialty.  See \"Patient Info\" tab in inbasket, or \"Choose Columns\" in Meds & Orders section of the refill encounter.             Passed - Medication is active on med list       Passed - Patient is age 18 or older       Passed - No active pregancy on record       Passed - Normal serum creatinine on file in past 12 months    Recent Labs   Lab Test 08/04/18  0900   CR 0.72             Passed - Normal serum potassium on file in past 12 months    Recent Labs   Lab Test 08/08/18  1005   POTASSIUM 4.2                   Passed - Normal serum sodium on file in past 12 months    Recent Labs   Lab Test 08/04/18  0900                Passed - No positive pregnancy test in past 12 months        Last Written Prescription Date:  12/10/2018  Last Fill Quantity: 30,  # refills: 4   Last office visit: 8/28/2018 with prescribing provider:  Raffaele Metzger   Future Office Visit:   Next 5 appointments (look out 90 days)    Mar 05, 2019  2:20 PM CST  Office Visit with Nayeli Castorena PA-C  [facility] ([facility]) [address] [place] [id number] [phone]           "

## 2019-03-04 NOTE — TELEPHONE ENCOUNTER
Prescription approved per [facility] Refill [facility].  Merlene Ibarra RN  [facility]

## 2019-04-16 ENCOUNTER — MEDICAL CORRESPONDENCE (OUTPATIENT)
Dept: HEALTH INFORMATION MANAGEMENT | Facility: CLINIC | Age: 74
End: 2019-04-16

## 2019-04-26 DIAGNOSIS — L40.0 PSORIASIS VULGARIS: Primary | ICD-10-CM

## 2019-04-26 DIAGNOSIS — L73.8 FOLLICULAR ECZEMA: ICD-10-CM

## 2019-04-26 DIAGNOSIS — L40.8 OTHER PSORIASIS: ICD-10-CM

## 2019-04-26 LAB
ALBUMIN SERPL-MCNC: 3.7 G/DL (ref 3.4–5)
ALP SERPL-CCNC: 97 U/L (ref 40–150)
ALT SERPL W P-5'-P-CCNC: 35 U/L (ref 0–50)
AST SERPL W P-5'-P-CCNC: 67 U/L (ref 0–45)
BASOPHILS # BLD AUTO: 0.1 10E9/L (ref 0–0.2)
BASOPHILS NFR BLD AUTO: 1.3 %
BILIRUB DIRECT SERPL-MCNC: 0.2 MG/DL (ref 0–0.2)
BILIRUB SERPL-MCNC: 0.4 MG/DL (ref 0.2–1.3)
DIFFERENTIAL METHOD BLD: ABNORMAL
EOSINOPHIL # BLD AUTO: 0.1 10E9/L (ref 0–0.7)
EOSINOPHIL NFR BLD AUTO: 1.9 %
ERYTHROCYTE [DISTWIDTH] IN BLOOD BY AUTOMATED COUNT: 13.2 % (ref 10–15)
HCT VFR BLD AUTO: 36.7 % (ref 35–47)
HGB BLD-MCNC: 13.4 G/DL (ref 11.7–15.7)
LYMPHOCYTES # BLD AUTO: 1 10E9/L (ref 0.8–5.3)
LYMPHOCYTES NFR BLD AUTO: 19.6 %
MCH RBC QN AUTO: 37 PG (ref 26.5–33)
MCHC RBC AUTO-ENTMCNC: 36.5 G/DL (ref 31.5–36.5)
MCV RBC AUTO: 101 FL (ref 78–100)
MONOCYTES # BLD AUTO: 0.7 10E9/L (ref 0–1.3)
MONOCYTES NFR BLD AUTO: 12.6 %
NEUTROPHILS # BLD AUTO: 3.4 10E9/L (ref 1.6–8.3)
NEUTROPHILS NFR BLD AUTO: 64.6 %
PLATELET # BLD AUTO: 284 10E9/L (ref 150–450)
PROT SERPL-MCNC: 8 G/DL (ref 6.8–8.8)
RBC # BLD AUTO: 3.62 10E12/L (ref 3.8–5.2)
WBC # BLD AUTO: 5.3 10E9/L (ref 4–11)

## 2019-04-26 PROCEDURE — 36415 COLL VENOUS BLD VENIPUNCTURE: CPT | Mod: GA | Performed by: INTERNAL MEDICINE

## 2019-04-26 PROCEDURE — 85025 COMPLETE CBC W/AUTO DIFF WBC: CPT | Performed by: INTERNAL MEDICINE

## 2019-04-26 PROCEDURE — 86803 HEPATITIS C AB TEST: CPT | Performed by: INTERNAL MEDICINE

## 2019-04-26 PROCEDURE — 80076 HEPATIC FUNCTION PANEL: CPT | Performed by: INTERNAL MEDICINE

## 2019-04-26 PROCEDURE — 86481 TB AG RESPONSE T-CELL SUSP: CPT | Performed by: INTERNAL MEDICINE

## 2019-04-26 PROCEDURE — 87340 HEPATITIS B SURFACE AG IA: CPT | Mod: GA | Performed by: INTERNAL MEDICINE

## 2019-04-26 PROCEDURE — 86706 HEP B SURFACE ANTIBODY: CPT | Mod: GA | Performed by: INTERNAL MEDICINE

## 2019-04-29 LAB
GAMMA INTERFERON BACKGROUND BLD IA-ACNC: 0.13 IU/ML
HBV SURFACE AB SERPL IA-ACNC: 0.39 M[IU]/ML
HBV SURFACE AG SERPL QL IA: NONREACTIVE
HCV AB SERPL QL IA: NONREACTIVE
M TB IFN-G BLD-IMP: NEGATIVE
M TB IFN-G CD4+ BCKGRND COR BLD-ACNC: 2.65 IU/ML
MITOGEN IGNF BCKGRD COR BLD-ACNC: 0 IU/ML
MITOGEN IGNF BCKGRD COR BLD-ACNC: 0 IU/ML

## 2019-05-15 DIAGNOSIS — G89.4 CHRONIC PAIN SYNDROME: ICD-10-CM

## 2019-05-15 RX ORDER — GABAPENTIN 300 MG/1
300 CAPSULE ORAL 3 TIMES DAILY
Qty: 90 CAPSULE | Refills: 0 | Status: SHIPPED | OUTPATIENT
Start: 2019-05-15 | End: 2019-06-26

## 2019-05-15 NOTE — TELEPHONE ENCOUNTER
Routing refill request to provider for review/approval because:  Drug not on the [facility] refill protocol     Ryann Harrison RN, BSN  [facility]

## 2019-05-15 NOTE — TELEPHONE ENCOUNTER
gabapentin (NEURONTIN) 300 MG capsule          Last Written Prescription Date:  11.14.18  Last Fill Quantity: 90 capsule,   # refills: 3  Last Office Visit: 8.28.18  Future Office visit:       Routing refill request to provider for review/approval because:  Drug not on the [facility], [facility] or [facility] refill protocol or controlled substance

## 2019-05-20 NOTE — TELEPHONE ENCOUNTER
Left non-detailed message for patient to call back.  Please schedule follow up when patient calls back.  (see previous notes for details)    Yamilet Willis

## 2019-06-06 DIAGNOSIS — I10 BENIGN HYPERTENSION: ICD-10-CM

## 2019-06-06 NOTE — TELEPHONE ENCOUNTER
"Requested Prescriptions   Pending Prescriptions Disp Refills     furosemide (LASIX) 20 MG tablet [Pharmacy Med Name: FUROSEMIDE 20 MG TABLET]  Last Written Prescription Date:  3/4/2019  Last Fill Quantity: 90 tabs,  # refills: 0   Last office visit: 8/28/2018 with prescribing provider:  Raffaele   Future Office Visit:   Next 5 appointments (look out 90 days)    Jun 12, 2019  3:00 PM CDT  Office Visit with Nayeli Castroena PA-C  [facility] [address] [place] [id number] [phone]          90 tablet 0     Sig: TAKE 1 TABLET BY MOUTH EVERY DAY       Diuretics (Including Combos) Protocol Passed - 6/6/2019  1:32 AM        Passed - Blood pressure under 140/90 in past 12 months     BP Readings from Last 3 Encounters:   12/10/18 100/60   09/27/18 127/84   09/06/18 116/80                 Passed - Recent (12 mo) or future (30 days) visit within the authorizing provider's specialty     Patient had office visit in the last 12 months or has a visit in the next 30 days with authorizing provider or within the authorizing provider's specialty.  See \"Patient Info\" tab in inbasket, or \"Choose Columns\" in Meds & Orders section of the refill encounter.              Passed - Medication is active on med list        Passed - Patient is age 18 or older        Passed - No active pregancy on record        Passed - Normal serum creatinine on file in past 12 months     Recent Labs   Lab Test 08/04/18  0900   CR 0.72              Passed - Normal serum potassium on file in past 12 months     Recent Labs   Lab Test 08/08/18  1005   POTASSIUM 4.2                    Passed - Normal serum sodium on file in past 12 months     Recent Labs   Lab Test 08/04/18  0900                 Passed - No positive pregnancy test in past 12 months          "

## 2019-06-09 RX ORDER — FUROSEMIDE 20 MG
TABLET ORAL
Qty: 90 TABLET | Refills: 0 | Status: SHIPPED | OUTPATIENT
Start: 2019-06-09 | End: 2019-12-04

## 2019-06-10 NOTE — TELEPHONE ENCOUNTER
Prescription approved per [facility] [place]ill [place].    Ember Hammer, BS, RN, N  [facility]) [phone]

## 2019-06-15 DIAGNOSIS — G89.4 CHRONIC PAIN SYNDROME: ICD-10-CM

## 2019-06-15 NOTE — TELEPHONE ENCOUNTER
Requested Prescriptions   Pending Prescriptions Disp Refills     gabapentin (NEURONTIN) 300 MG capsule [Pharmacy Med Name: GABAPENTIN 300 MG CAPSULE] 90 capsule 0     Sig: TAKE 1 CAPSULE (300 MG) BY MOUTH 3 TIMES DAILY (OFFICE VISIT REQUIRED FOR FURTHER FILLS)       There is no refill protocol information for this order        Last Written Prescription Date:  5/15/19  Last Fill Quantity: 90,  # refills:0   Last office visit: 8/28/2018 with prescribing provider:   Future Office Visit:   Next 5 appointments (look out 90 days)    Jun 18, 2019 11:40 AM CDT  Office Visit with Nayeli Castorena PA-C  [facility] [address] [place] [id number] [phone]

## 2019-06-17 RX ORDER — GABAPENTIN 300 MG/1
300 CAPSULE ORAL 3 TIMES DAILY
Qty: 90 CAPSULE | Refills: 0 | OUTPATIENT
Start: 2019-06-17

## 2019-06-17 NOTE — TELEPHONE ENCOUNTER
Pt due for f/u - has appt tomorrow.  Will fill at that visit.  Cancelled last med check f/u and has been >6 months since last OV.        Nayeli Castorena MS, PA-C

## 2019-06-26 ENCOUNTER — OFFICE VISIT (OUTPATIENT)
Dept: FAMILY MEDICINE | Facility: CLINIC | Age: 74
End: 2019-06-26
Payer: COMMERCIAL

## 2019-06-26 VITALS
WEIGHT: 161 LBS | OXYGEN SATURATION: 96 % | HEART RATE: 94 BPM | BODY MASS INDEX: 27.49 KG/M2 | HEIGHT: 64 IN | SYSTOLIC BLOOD PRESSURE: 110 MMHG | DIASTOLIC BLOOD PRESSURE: 68 MMHG | TEMPERATURE: 98.2 F

## 2019-06-26 DIAGNOSIS — K70.9 LIVER DISEASE, CHRONIC, DUE TO ALCOHOL (H): ICD-10-CM

## 2019-06-26 DIAGNOSIS — F41.9 ANXIETY AND DEPRESSION: ICD-10-CM

## 2019-06-26 DIAGNOSIS — F10.20 SEVERE ALCOHOL DEPENDENCE (H): Primary | ICD-10-CM

## 2019-06-26 DIAGNOSIS — I77.819 AORTIC DILATATION (H): ICD-10-CM

## 2019-06-26 DIAGNOSIS — I47.10 PAROXYSMAL SUPRAVENTRICULAR TACHYCARDIA (H): ICD-10-CM

## 2019-06-26 DIAGNOSIS — G47.00 INSOMNIA, UNSPECIFIED TYPE: ICD-10-CM

## 2019-06-26 DIAGNOSIS — R74.8 ABNORMAL LEVELS OF OTHER SERUM ENZYMES: ICD-10-CM

## 2019-06-26 DIAGNOSIS — F32.0 MILD MAJOR DEPRESSION (H): ICD-10-CM

## 2019-06-26 DIAGNOSIS — G89.4 CHRONIC PAIN SYNDROME: ICD-10-CM

## 2019-06-26 DIAGNOSIS — E78.5 HYPERLIPIDEMIA, UNSPECIFIED HYPERLIPIDEMIA TYPE: ICD-10-CM

## 2019-06-26 DIAGNOSIS — F32.A ANXIETY AND DEPRESSION: ICD-10-CM

## 2019-06-26 DIAGNOSIS — N18.30 CHRONIC KIDNEY DISEASE, STAGE III (MODERATE) (H): ICD-10-CM

## 2019-06-26 DIAGNOSIS — I77.810 MILD ASCENDING AORTA DILATATION (H): ICD-10-CM

## 2019-06-26 DIAGNOSIS — I25.10 CORONARY ARTERY DISEASE INVOLVING NATIVE CORONARY ARTERY OF NATIVE HEART WITHOUT ANGINA PECTORIS: ICD-10-CM

## 2019-06-26 DIAGNOSIS — K76.9 LIVER DISEASE: ICD-10-CM

## 2019-06-26 DIAGNOSIS — I10 BENIGN HYPERTENSION: ICD-10-CM

## 2019-06-26 DIAGNOSIS — I77.810 ASCENDING AORTA DILATATION (H): ICD-10-CM

## 2019-06-26 PROBLEM — D69.6 THROMBOCYTOPENIA (H): Status: RESOLVED | Noted: 2018-08-15 | Resolved: 2019-06-26

## 2019-06-26 LAB
ERYTHROCYTE [DISTWIDTH] IN BLOOD BY AUTOMATED COUNT: 12.3 % (ref 10–15)
HCT VFR BLD AUTO: 36.9 % (ref 35–47)
HGB BLD-MCNC: 13.2 G/DL (ref 11.7–15.7)
MCH RBC QN AUTO: 35.8 PG (ref 26.5–33)
MCHC RBC AUTO-ENTMCNC: 35.8 G/DL (ref 31.5–36.5)
MCV RBC AUTO: 100 FL (ref 78–100)
PLATELET # BLD AUTO: 286 10E9/L (ref 150–450)
RBC # BLD AUTO: 3.69 10E12/L (ref 3.8–5.2)
WBC # BLD AUTO: 13.8 10E9/L (ref 4–11)

## 2019-06-26 PROCEDURE — 80307 DRUG TEST PRSMV CHEM ANLYZR: CPT | Performed by: PHYSICIAN ASSISTANT

## 2019-06-26 PROCEDURE — 84425 ASSAY OF VITAMIN B-1: CPT | Mod: 90 | Performed by: PHYSICIAN ASSISTANT

## 2019-06-26 PROCEDURE — 80320 DRUG SCREEN QUANTALCOHOLS: CPT | Performed by: PHYSICIAN ASSISTANT

## 2019-06-26 PROCEDURE — 99000 SPECIMEN HANDLING OFFICE-LAB: CPT | Performed by: PHYSICIAN ASSISTANT

## 2019-06-26 PROCEDURE — 80053 COMPREHEN METABOLIC PANEL: CPT | Performed by: PHYSICIAN ASSISTANT

## 2019-06-26 PROCEDURE — 82728 ASSAY OF FERRITIN: CPT | Performed by: PHYSICIAN ASSISTANT

## 2019-06-26 PROCEDURE — 82607 VITAMIN B-12: CPT | Performed by: PHYSICIAN ASSISTANT

## 2019-06-26 PROCEDURE — 36415 COLL VENOUS BLD VENIPUNCTURE: CPT | Performed by: PHYSICIAN ASSISTANT

## 2019-06-26 PROCEDURE — 82306 VITAMIN D 25 HYDROXY: CPT | Performed by: PHYSICIAN ASSISTANT

## 2019-06-26 PROCEDURE — 80061 LIPID PANEL: CPT | Performed by: PHYSICIAN ASSISTANT

## 2019-06-26 PROCEDURE — 82043 UR ALBUMIN QUANTITATIVE: CPT | Performed by: PHYSICIAN ASSISTANT

## 2019-06-26 PROCEDURE — 82977 ASSAY OF GGT: CPT | Performed by: PHYSICIAN ASSISTANT

## 2019-06-26 PROCEDURE — 99215 OFFICE O/P EST HI 40 MIN: CPT | Performed by: PHYSICIAN ASSISTANT

## 2019-06-26 PROCEDURE — 82746 ASSAY OF FOLIC ACID SERUM: CPT | Performed by: PHYSICIAN ASSISTANT

## 2019-06-26 PROCEDURE — 85027 COMPLETE CBC AUTOMATED: CPT | Performed by: PHYSICIAN ASSISTANT

## 2019-06-26 RX ORDER — TRAZODONE HYDROCHLORIDE 100 MG/1
TABLET ORAL
Qty: 90 TABLET | Refills: 1 | Status: SHIPPED | OUTPATIENT
Start: 2019-06-26 | End: 2019-12-04

## 2019-06-26 RX ORDER — GABAPENTIN 300 MG/1
300 CAPSULE ORAL 3 TIMES DAILY
Qty: 90 CAPSULE | Refills: 1 | Status: SHIPPED | OUTPATIENT
Start: 2019-06-26 | End: 2019-09-05

## 2019-06-26 RX ORDER — ATENOLOL 25 MG/1
25 TABLET ORAL EVERY MORNING
Qty: 100 TABLET | Refills: 4 | Status: SHIPPED | OUTPATIENT
Start: 2019-06-26 | End: 2020-01-28

## 2019-06-26 RX ORDER — ADALIMUMAB 40MG/0.8ML
KIT SUBCUTANEOUS
Refills: 0 | COMMUNITY
Start: 2019-04-23 | End: 2019-12-04

## 2019-06-26 RX ORDER — HYDROXYZINE HYDROCHLORIDE 25 MG/1
25 TABLET, FILM COATED ORAL EVERY 6 HOURS PRN
Qty: 90 TABLET | Refills: 0 | Status: SHIPPED | OUTPATIENT
Start: 2019-06-26 | End: 2019-07-20

## 2019-06-26 RX ORDER — SPIRONOLACTONE 100 MG/1
100 TABLET, FILM COATED ORAL DAILY
Qty: 90 TABLET | Refills: 0 | Status: SHIPPED | OUTPATIENT
Start: 2019-06-26 | End: 2019-11-06

## 2019-06-26 RX ORDER — BACLOFEN 10 MG/1
TABLET ORAL
Qty: 90 TABLET | Refills: 0 | Status: SHIPPED | OUTPATIENT
Start: 2019-06-26 | End: 2019-09-18

## 2019-06-26 RX ORDER — ESCITALOPRAM OXALATE 10 MG/1
10 TABLET ORAL DAILY
Qty: 90 TABLET | Refills: 0 | Status: SHIPPED | OUTPATIENT
Start: 2019-06-26 | End: 2019-09-12 | Stop reason: SINTOL

## 2019-06-26 ASSESSMENT — ANXIETY QUESTIONNAIRES
GAD7 TOTAL SCORE: 13
IF YOU CHECKED OFF ANY PROBLEMS ON THIS QUESTIONNAIRE, HOW DIFFICULT HAVE THESE PROBLEMS MADE IT FOR YOU TO DO YOUR WORK, TAKE CARE OF THINGS AT HOME, OR GET ALONG WITH OTHER PEOPLE: VERY DIFFICULT
1. FEELING NERVOUS, ANXIOUS, OR ON EDGE: NEARLY EVERY DAY
5. BEING SO RESTLESS THAT IT IS HARD TO SIT STILL: SEVERAL DAYS
3. WORRYING TOO MUCH ABOUT DIFFERENT THINGS: MORE THAN HALF THE DAYS
6. BECOMING EASILY ANNOYED OR IRRITABLE: SEVERAL DAYS
2. NOT BEING ABLE TO STOP OR CONTROL WORRYING: MORE THAN HALF THE DAYS
7. FEELING AFRAID AS IF SOMETHING AWFUL MIGHT HAPPEN: NEARLY EVERY DAY

## 2019-06-26 ASSESSMENT — PATIENT HEALTH QUESTIONNAIRE - PHQ9
SUM OF ALL RESPONSES TO PHQ QUESTIONS 1-9: 18
5. POOR APPETITE OR OVEREATING: SEVERAL DAYS

## 2019-06-26 ASSESSMENT — MIFFLIN-ST. JEOR: SCORE: 1210.29

## 2019-06-26 NOTE — Clinical Note
Dr. Ugarte - this pt is overdue for f/u with you with ALEJANDRA and CT angiogram prior to appt.  Could you please ask your staff to reach out to facilitate scheduling and f/u?  Thank you!Nayeli Castorena MS, PA-[facility]

## 2019-06-26 NOTE — PROGRESS NOTES
"Subjective     Kavitha Headley is a 73 year old female who presents to clinic today for the following health issues:    HPI     Esophagitis, Liver disease, chronic, due to alcohol (H)  Kavitha has historically seen Dr. Graham for esophagitis and had an endoscopy performed in 08/2017 that found Otis-en-y gastrojejunostomy with gastrojejunal anastomosis characterized by congestion, erosion and edema. She reports she has not followed up with him since.  MTM recommended not using carafate long term due to malabsorption of other substances with this medication. Denies nausea, vomiting or dark stools.     Depression and Anxiety Follow-Up  Brintellix 10 mg - not taken since 11/2018 - would like to try another med - this was very expensive. Still going through bankruptcy process.   Had a \"falling out\" with psychiatrist Dr. Vargas after her hospital stay. He had advised her to stay and complete her stay however she \"wanted out\". Left hospital AMA and has not followed back with him or any other psychiatrist since.   Has tried Zoloft from a friend script - experienced negative side effects   Lexapro 10 mg 2005/2006 - unsure of side effects  Wellbutrin 2004 - unsure of side effects   Paxil - short lived  Citalopram - 2007  Hydroxyzine - worked very well, would like this PRN.    How are you doing with your depression since your last visit? worsened    How are you doing with your anxiety since your last visit?  Worsened     Are you having other symptoms that might be associated with depression or anxiety? Yes:  overall free floating anxiety    Have you had a significant life event? OTHER: Bankrupcy     Do you have any concerns with your use of alcohol or other drugs? Yes:  drinks too much - self medication    Social History     Tobacco Use     Smoking status: Never Smoker     Smokeless tobacco: Never Used   Substance Use Topics     Alcohol use: Yes     Alcohol/week: 0.0 oz     Comment: 5 drinks per week     Drug use: No " "    PHQ 8/14/2017 2/5/2018 6/26/2019   PHQ-9 Total Score 2 2 18   Q9: Thoughts of better off dead/self-harm past 2 weeks Not at all Not at all Not at all     NITHIN-7 SCORE 8/14/2017 2/5/2018 6/26/2019   Total Score - - -   Total Score 1 1 13     No flowsheet data found.  No flowsheet data found.  Suicide Assessment Five-step Evaluation and Treatment (SAFE-T)    Alcohol Abuse  3 months sober following hospital stay in 08/2018 for alcohol withdrawal (tried to quit on her own cold . Started drinking again during financial issues and now reports self medicating with alcohol secondary to anxiety. States she is drinking 3 drinks per day (each drink with ~3 oz. of liquor). Pt is essentially drinking 9 drinks per day. She has thought about going to treatment for her alcohol use but no active plan. Has tried a formal inpatient treatment program in 2017 at [facility] in [place] (? Need records)  but did not complete the full 28 days. Has seen a therapist in the past but no one recently. Patient states \"I do need help\".    Benign hypertension, Aortic dilatation (H), Paroxysmal supraventricular tachycardia   Linda follows with Dr. Ugarte for her non-rheumatic moderate aortic valve stenosis, sinus tachycardia and CAD. She was started on atenolol 25 mg for her sinus tachycardia. She last saw him on 12/10/2018 who found her aortic valve stenosis to be progressing into the moderate to severe range. She advised her to schedule a follow up for a transesophageal echocardiogram and CT angiogram, however there has been no follow up or one scheduled with Dr. Ugarte. Denies heart palpitations.  Treating her hypertension with atenolol 25 mg daily, spironolactone 100 mg daily Lasix 20 mg 3x/week. Reports compliance with medication and no adverse side effects.   BP Readings from Last 3 Encounters:   06/26/19 110/68   12/10/18 100/60   09/27/18 127/84       Insomnia  Linda reports she is taking trazodone 100 mg at bedtime every night. She was " prescribed a 6 month supply in 08/2018 by myself. Should've ran out in February. Questioning if pt is taking as prescribed or taking partial??  Pt hoping to get back on Remeron, understands this is not likely with her age and liver function/alcohol use.  Has been off for almost 1 year.  Doesn't like the way she feels after taking baclofen.       Psoriasis  Humira started 6 weeks ago - has done nothing so far for her psoriasis.    Patient Active Problem List   Diagnosis     Hyperlipidemia LDL goal <130     Spinal stenosis     Chronic insomnia     Alcohol abuse     CKD (chronic kidney disease) stage 3, GFR 30-59 ml/min (H)     Hip joint replacement status     Knee joint replacement status     Chronic pain syndrome     Bariatric surgery status     Personal history of urinary calculi     Macrocytic anemia     Anxiety     Aortic stenosis     Left-sided low back pain without sciatica     PAC (premature atrial contraction)     Gastroesophageal reflux disease, esophagitis presence not specified     Controlled substance agreement signed     Seasonal affective disorder (H)     HTN, goal below 140/90     Bilateral lower leg cellulitis     Hypokalemia     Hyponatremia     Cellulitis     Depression, unspecified depression type     Vitamin B12 deficiency     Severe alcohol dependence (H)     Anxiety and depression     Liver disease, chronic, due to alcohol (H)     Thrombocytopenia (H)     Paroxysmal supraventricular tachycardia (H)     Coronary artery disease involving native coronary artery of native heart without angina pectoris     Ascending aorta dilatation (H)     Sinus tachycardia     Past Surgical History:   Procedure Laterality Date     APPENDECTOMY  3/2004    incidental     C GASTRIC BYPASS,OBESE<100CM ARIANNA-EN-Y  1996     C MEDIASTINOSCOPY W OR WO BIOPSY  2/2008    Videomediastinoscopy and, for mediastinal adenopathy -reactive lymphoid hyperplasia     C REPAIR OF RECTOCELE  3/2012     C TOTAL KNEE ARTHROPLASTY  12/2005     left      CARPAL TUNNEL RELEASE RT/LT  10/2010    Carpometacarpal excisional arthroplasty with a fascial autograft and APL suspension sling (63263). 2. Left thumb metacarpophalangeal joint fusion with autologous bone graft (08212). 3. Left endoscopic carpal tunnel release      CHOLECYSTECTOMY, LAPOROSCOPIC  11/2010    Cholecystectomy, Laparoscopic     COLONOSCOPY N/A 9/8/2016    Procedure: COMBINED COLONOSCOPY, SINGLE OR MULTIPLE BIOPSY/POLYPECTOMY BY BIOPSY;  Surgeon: Moe Barlow MD;  Location:  GI     CYSTOCELE REPAIR  11/2012    davinc laparoscopic sacrocolpopexy, enterocele repair, lysis of adhesions, placement of retropubic mid urethral sling, cystoscopy     CYSTOSCOPY, LITHOTRIPSY, COMBINED  6/2006    Left extracorporeal shock wave lithotripsy, cystoscopy, left ureteral stent placement.     CYSTOSCOPY, REMOVE STENT(S), COMBINED  7/2006    Cystoscopy, removal of left ureteral stent, retrograde pyelography, flexible and rigid ureteroscopy and holmium laser lithotripsy, basket removal of stone fragments, ureteral stent placement.      ENDOSCOPIC ULTRASOUND UPPER GASTROINTESTINAL TRACT (GI) N/A 6/12/2017    Procedure: ENDOSCOPIC ULTRASOUND, ESOPHAGOSCOPY / UPPER GASTROINTESTINAL TRACT (GI);  ENDOSCOPIC ULTRASOUND, ESOPHAGOSCOPY / UPPER GASTROINTESTINAL TRACT (GI);  Surgeon: Parth Graham MD;  Location:  GI     HERNIA REPAIR  4/2012    bilateral augmentation mastopexy, ventral hernia repair, and medial thigh liposuction on 04/06/2012.      HYSTERECTOMY VAGINAL, BILATERAL SALPINGO-OOPHERECTOMY, COMBINED  1998    due to myoma and bleeding     JOINT REPLACEMENT, HIP RT/LT  4/2004    right total hip arthroplasty     LAPAROTOMY, LYSIS ADHESIONS, COMBINED  3/2004    lysis adhesions, ventral hernia repair, appendectomy incidentally     LYMPH NODE BIOPSY  4/2008    right axillary, reactive follicular and paracortical hyperplasia.     MAMMOPLASTY AUGMENTATION BILATERAL  4/2012     REVISE RECONSTRUCTED  BREAST  6/7/2012    Left breast capsulotomy.        Social History     Tobacco Use     Smoking status: Never Smoker     Smokeless tobacco: Never Used   Substance Use Topics     Alcohol use: Yes     Alcohol/week: 0.0 oz     Comment: 5 drinks per week     Family History   Problem Relation Age of Onset     Substance Abuse Father      Cancer Father         throat and lung mets     Diabetes No family hx of      Coronary Artery Disease No family hx of      Cerebrovascular Disease No family hx of          Current Outpatient Medications   Medication Sig Dispense Refill     atenolol (TENORMIN) 25 MG tablet Take 1 tablet (25 mg) by mouth every morning 100 tablet 4     baclofen (LIORESAL) 10 MG tablet TAKE 0.5 TABLET (5 MG) BY MOUTH 2 TIMES DAILY AS NEEDED FOR MUSCLE SPASMS 90 tablet 0     Cyanocobalamin (B-12) 1000 MCG TBCR Take 1,000 mcg by mouth daily 100 tablet 1     diphenhydrAMINE (BENADRYL) 25 MG capsule Take 25 mg by mouth nightly as needed       escitalopram (LEXAPRO) 10 MG tablet Take 1 tablet (10 mg) by mouth daily 90 tablet 0     folic acid (FOLVITE) 1 MG tablet Take 1 tablet (1 mg) by mouth daily 30 tablet      furosemide (LASIX) 20 MG tablet TAKE 1 TABLET BY MOUTH EVERY DAY 90 tablet 0     furosemide (LASIX) 20 MG tablet Take 1 tablet (20 mg) by mouth as needed (leg edema) 30 tablet 4     gabapentin (NEURONTIN) 300 MG capsule Take 1 capsule (300 mg) by mouth 3 times daily 90 capsule 1     HUMIRA PEN-PS/UV/ADOL HS START 40 MG/0.8ML pen kit   0     hydrOXYzine (ATARAX) 25 MG tablet Take 1 tablet (25 mg) by mouth every 6 hours as needed for anxiety 90 tablet 0     ketoconazole (NIZORAL) 2 % shampoo Apply to the affected area and wash off after 5 minutes. 120 mL 1     magnesium oxide (MAG-OX) 400 MG tablet Take 1 tablet (400 mg) by mouth daily 7 tablet      melatonin 3 MG tablet Take 9 mg by mouth nightly as needed for sleep        Multiple Vitamins-Minerals (CENTRUM SILVER) per tablet Take 1 tablet by mouth daily    "    polyethylene glycol (MIRALAX/GLYCOLAX) Packet Take 17 g by mouth daily as needed (constipation) 7 packet      senna-docusate (SENOKOT-S;PERICOLACE) 8.6-50 MG per tablet Take 1-2 tablets by mouth 2 times daily as needed for constipation 100 tablet      spironolactone (ALDACTONE) 100 MG tablet Take 1 tablet (100 mg) by mouth daily 90 tablet 0     STATIN NOT PRESCRIBED, INTENTIONAL, Please choose reason not prescribed, below       sucralfate (CARAFATE) 1 GM/10ML suspension Take 10 mLs (1 g) by mouth 4 times daily (before meals and nightly) Pt takes once daily 1200 mL 1     thiamine 100 MG tablet Take 1 tablet (100 mg) by mouth daily       traZODone (DESYREL) 100 MG tablet TAKE 1 TABLET (100 MG) BY MOUTH NIGHTLY AS NEEDED FOR SLEEP 90 tablet 1     valACYclovir (VALTREX) 1000 mg tablet TAKE 2 TABS BY MOUTH EVERY 12 HOURS FOR 1 DAY ONLY FOR OUTBREAKS OF COLD SORES AS NEEDED 4 tablet 2     Allergies   Allergen Reactions     Bactrim [Sulfamethoxazole W/Trimethoprim] Hives     Codeine Itching     NAUSEA     Morphine Itching     NAUSEA       Reviewed and updated as needed this visit by Provider  Tobacco  Allergies  Meds  Problems  Med Hx  Surg Hx  Fam Hx         Review of Systems   ROS COMP: Constitutional, HEENT, cardiovascular, pulmonary, GI, , musculoskeletal, neuro, skin, endocrine and psych systems are negative, except as otherwise noted.    This document serves as a record of the services and decisions personally performed and made by PATRICIA Jalloh. It was created on her behalf by Melva Vega, a trained medical scribe. The creation of this document is based on the provider's statements to the medical scribe.  Melva Vega June 26, 2019 3:49 PM          Objective    /68 (BP Location: Right arm, Patient Position: Chair, Cuff Size: Adult Regular)   Pulse 94   Temp 98.2  F (36.8  C) (Oral)   Ht 1.626 m (5' 4\")   Wt 73 kg (161 lb)   SpO2 96%   Breastfeeding? No   BMI 27.64 kg/m    Body " mass index is 27.64 kg/m .  Physical Exam   GENERAL: healthy, alert and no distress  RESP: lungs clear to auscultation - no rales, rhonchi or wheezes  CV: III/VI systolic ejection murmur, regular rate and rhythm, normal S1 S2, no S3 or S4, no murmur, click or rub, no peripheral edema and peripheral pulses strong  MS: no gross musculoskeletal defects noted, no edema  SKIN: no suspicious lesions or rashes  NEURO: Normal strength and tone, mentation intact and speech normal  PSYCH: mentation appears normal, affect normal/bright    Diagnostic Test Results:  Labs reviewed in Epic  Results for orders placed or performed in visit on 06/26/19 (from the past 24 hour(s))   CBC with platelets   Result Value Ref Range    WBC 13.8 (H) 4.0 - 11.0 10e9/L    RBC Count 3.69 (L) 3.8 - 5.2 10e12/L    Hemoglobin 13.2 11.7 - 15.7 g/dL    Hematocrit 36.9 35.0 - 47.0 %     78 - 100 fl    MCH 35.8 (H) 26.5 - 33.0 pg    MCHC 35.8 31.5 - 36.5 g/dL    RDW 12.3 10.0 - 15.0 %    Platelet Count 286 150 - 450 10e9/L           Assessment & Plan     Kavitha was seen today for recheck medication.    Diagnoses and all orders for this visit:    Severe alcohol dependence (H), Liver disease, chronic, due to alcohol (H), Liver disease, Abnormal levels of other serum enzymes, Mild major depression (H), Anxiety and depression   Restart Lexapro (previously took in 2005). Must address alcohol use by going through an assessment for chemical dependency. New referral placed for mental health (psychiatry & chem dep evaluation). Pt stated today she knows she needs help.   -     MENTAL HEALTH REFERRAL  - Adult; Psychiatry and Medication Management, Assessments and Testing; Chemical Health Assessment; Other: [facility] [phone]; We will contact you to schedule the appointment or please call with ...  -     escitalopram (LEXAPRO) 10 MG tablet; Take 1 tablet (10 mg) by mouth daily  -     hydrOXYzine (ATARAX) 25 MG tablet; Take 1  tablet (25 mg) by mouth every 6 hours as needed for anxiety  -     Vitamin D Deficiency  -     Comprehensive metabolic panel  -     GGT  -     Ferritin  -     CBC with platelets  -     Vitamin B12  -     Folate  -     Vitamin B1 whole blood    Mild ascending aorta dilatation (H), Aortic dilatation (H), Ascending aorta dilatation (H), Coronary artery disease involving native coronary artery of native heart without angina pectoris, Paroxysmal supraventricular tachycardia (H), Chronic kidney disease, stage III (moderate) (H), Benign hypertension  Follow up due with cardiology. Continue current medication regimen. Pt did not leave urine today as recommended.   -     spironolactone (ALDACTONE) 100 MG tablet; Take 1 tablet (100 mg) by mouth daily        -     atenolol (TENORMIN) 25 MG tablet; Take 1 tablet (25 mg) by mouth every morning  -     Albumin Random Urine Quantitative with Creat Ratio    Insomnia, unspecified type  Stable.  -     traZODone (DESYREL) 100 MG tablet; TAKE 1 TABLET (100 MG) BY MOUTH NIGHTLY AS NEEDED FOR SLEEP  -     SLEEP EVALUATION & MANAGEMENT REFERRAL - ADULT -[facility] [phone]  (Age 18 and up); Future    Chronic pain syndrome  Stable, continue current regimen.   -     gabapentin (NEURONTIN) 300 MG capsule; Take 1 capsule (300 mg) by mouth 3 times daily  -     baclofen (LIORESAL) 10 MG tablet; TAKE 0.5 TABLET (5 MG) BY MOUTH 2 TIMES DAILY AS NEEDED FOR MUSCLE SPASMS  Greater than 45 minutes were spent with the patient. The majority of this time was coordinating care and counseling regarding the above diagnosis .    Return in about 6 weeks (around 8/7/2019).    The information in this document, created by the medical scribe for me, accurately reflects the services I personally performed and the decisions made by me. I have reviewed and approved this document for accuracy prior to leaving the patient care area.  June 26, 2019 3:49 PM      Nayeli Castorena,  CLEO  [facility]

## 2019-06-27 ENCOUNTER — TELEPHONE (OUTPATIENT)
Dept: FAMILY MEDICINE | Facility: CLINIC | Age: 74
End: 2019-06-27

## 2019-06-27 LAB
ALBUMIN SERPL-MCNC: 4.2 G/DL (ref 3.4–5)
ALP SERPL-CCNC: 79 U/L (ref 40–150)
ALT SERPL W P-5'-P-CCNC: 38 U/L (ref 0–50)
ANION GAP SERPL CALCULATED.3IONS-SCNC: 16 MMOL/L (ref 3–14)
AST SERPL W P-5'-P-CCNC: 60 U/L (ref 0–45)
BILIRUB SERPL-MCNC: 1 MG/DL (ref 0.2–1.3)
BUN SERPL-MCNC: 26 MG/DL (ref 7–30)
CALCIUM SERPL-MCNC: 9.9 MG/DL (ref 8.5–10.1)
CHLORIDE SERPL-SCNC: 95 MMOL/L (ref 94–109)
CHOLEST SERPL-MCNC: 220 MG/DL
CO2 SERPL-SCNC: 21 MMOL/L (ref 20–32)
CREAT SERPL-MCNC: 1.32 MG/DL (ref 0.52–1.04)
CREAT UR-MCNC: 178 MG/DL
FERRITIN SERPL-MCNC: 77 NG/ML (ref 8–252)
FOLATE SERPL-MCNC: 89.2 NG/ML
GFR SERPL CREATININE-BSD FRML MDRD: 39 ML/MIN/{1.73_M2}
GGT SERPL-CCNC: 196 U/L (ref 0–40)
GLUCOSE SERPL-MCNC: 130 MG/DL (ref 70–99)
HDLC SERPL-MCNC: 99 MG/DL
LDLC SERPL CALC-MCNC: 109 MG/DL
MICROALBUMIN UR-MCNC: 191 MG/L
MICROALBUMIN/CREAT UR: 107.3 MG/G CR (ref 0–25)
NONHDLC SERPL-MCNC: 121 MG/DL
POTASSIUM SERPL-SCNC: 4.9 MMOL/L (ref 3.4–5.3)
PROT SERPL-MCNC: 8.3 G/DL (ref 6.8–8.8)
SODIUM SERPL-SCNC: 132 MMOL/L (ref 133–144)
TRIGL SERPL-MCNC: 62 MG/DL
VIT B12 SERPL-MCNC: 319 PG/ML (ref 193–986)

## 2019-06-27 RX ORDER — POLYETHYLENE GLYCOL 3350 17 G/17G
17 POWDER, FOR SOLUTION ORAL EVERY 24 HOURS
COMMUNITY
Start: 2012-12-01 | End: 2019-06-27

## 2019-06-27 ASSESSMENT — ANXIETY QUESTIONNAIRES: GAD7 TOTAL SCORE: 13

## 2019-06-27 NOTE — TELEPHONE ENCOUNTER
Please call Dr. Clark with [facility] in [place] and advise on the patients significant decrease in renal function compared to 9 months ago (GFR is now 39, was 70 in 8/2018).  The only medication addition that she notes is the start of Humira ~ January 2019.   The pt denies any improvement in her psoriasis with the humira.  Based on this, would she advise discontinuation of this RX and recheck of renal function? (If so, how long after discontinuation).  **please send recommendation back to me when received**    Also advise that she is drinking heavily (admits to 9 drink equivalents daily) and her liver function are mildly elevated today.     Please fax copy of lab results to their office      Nayeli Castorena MS, PA-C

## 2019-06-28 LAB
AMPHETAMINES UR QL SCN: NEGATIVE
BARBITURATES UR QL: NEGATIVE
BENZODIAZ UR QL: NEGATIVE
CANNABINOIDS UR QL SCN: NEGATIVE
COCAINE UR QL: NEGATIVE
DEPRECATED CALCIDIOL+CALCIFEROL SERPL-MC: 72 UG/L (ref 20–75)
ETHANOL UR QL SCN: NEGATIVE
OPIATES UR QL SCN: NEGATIVE
PCP UR QL SCN: NEGATIVE

## 2019-06-28 NOTE — TELEPHONE ENCOUNTER
Attempt #1  Called Dr. Clark @ [phone] - [facility] detailed VM with patient information on the physician line.     Lab results faxed to [phone]      Sara Williamson RN  [facility] [place]

## 2019-06-30 LAB — VIT B1 BLD-MCNC: 61 NMOL/L (ref 70–180)

## 2019-07-01 NOTE — TELEPHONE ENCOUNTER
Attempt #2  Called Dr. Clark @ # below - left detailed VM requesting call back.    Sara Williamson RN  [facility]

## 2019-07-01 NOTE — TELEPHONE ENCOUNTER
Dr. Clark returning call    Stated that Humira is not associated with decrease in liver function. They do not really screen for liver function while on this medication.   Dr. Clark stated she is not aware that Humira is the culprit.   Stated they are due to see her as well (will be seeing either this week or next week).     Dr. Clark stated if LP, PA-C would like to discontinue the med and recheck in 6 months they can do that as well.     Routing to PCP for further review/recommendations/orders.    Sara Williamson RN  [facility]

## 2019-07-01 NOTE — RESULT ENCOUNTER NOTE
Triage: please advise of results/recommendations:     Linda  I have reviewed your recent labs. Here are the results:    -Normal red blood cell (hgb) levels, slightly elevated white blood cell count (could be due to a viral infection) and normal platelet levels.  -Liver and gallbladder tests (ALT,AST, GGT, Alk phos,bilirubin)  - liver function if elevated, especially the marker that is typically elevated in alcohol use.  Please TAPER your alcohol use, do not stop cold turkey as you can get very ill with the amount you have been consuming.  Also, proceed with the chemical dependency evaluation that we discussed.  -Kidney function (GFR) has decreased quite a bit from 9 months ago.  ADVISE: I have had our triage nurses try to reach your dermatologist as the new addition of the Humira can be the culprit.  I would hold off on this medication (especially since you have not noted any benefit) and recheck in 1 month.  -Sodium is decreased - likely due to your alcohol use.  ADVISE: work on tapering as we discussed and rechecking this in 1 month.  -Potassium is normal.  -Calcium is normal.  -Glucose (diabetic screening test) is normal.  -Vitamin D level is normal and getting 1000 IU daily in your diet or supplements is recommended.   -Ferritin (iron) level is normal.  -Thiamine is low despite supplementation, this is due to your alcohol use.  Please work on tapering and recheck in 1 month.  For additional lab test information, labtestsonline.org is an excellent reference.    If you have any questions please do not hesitate to contact our office via phone ([phone]) or MyChart.    Nayeli Castorena, MS, PA-C  [facility] [place]

## 2019-07-02 NOTE — TELEPHONE ENCOUNTER
Medication information for Humira does not list renal issues.  If it is not working for her psoriasis then I am okay with her stopping it and rechecking her kidney function in ~ 1 month.  Is she well hydrated,  has she been using NSAIDs?   Nephrology consult an option as well.

## 2019-07-02 NOTE — TELEPHONE ENCOUNTER
Called #   Telephone Information:   Mobile [phone]     Advised pt on the information below     Patient stated an understanding and agreed with plan.    Ryann Harrison RN, BSN  [facility]

## 2019-07-11 ENCOUNTER — MEDICAL CORRESPONDENCE (OUTPATIENT)
Dept: HEALTH INFORMATION MANAGEMENT | Facility: CLINIC | Age: 74
End: 2019-07-11

## 2019-07-11 ENCOUNTER — TRANSFERRED RECORDS (OUTPATIENT)
Dept: HEALTH INFORMATION MANAGEMENT | Facility: CLINIC | Age: 74
End: 2019-07-11

## 2019-07-12 ENCOUNTER — HOSPITAL ENCOUNTER (OUTPATIENT)
Dept: CARDIOLOGY | Facility: CLINIC | Age: 74
Discharge: HOME | End: 2019-07-12
Attending: INTERNAL MEDICINE | Admitting: INTERNAL MEDICINE
Payer: COMMERCIAL

## 2019-07-12 DIAGNOSIS — F10.10 EXCESSIVE CONSUMPTION OF ETHANOL: ICD-10-CM

## 2019-07-12 DIAGNOSIS — I35.9 AORTIC VALVE DISEASE: ICD-10-CM

## 2019-07-12 DIAGNOSIS — I35.8 OTHER NONRHEUMATIC AORTIC VALVE DISORDERS: ICD-10-CM

## 2019-07-12 DIAGNOSIS — I77.810 MILD ASCENDING AORTA DILATATION (H): ICD-10-CM

## 2019-07-12 LAB
ANION GAP SERPL CALCULATED.3IONS-SCNC: 12.2 MMOL/L (ref 6–17)
BUN SERPL-MCNC: 11 MG/DL (ref 7–30)
CALCIUM SERPL-MCNC: 10.3 MG/DL (ref 8.5–10.5)
CHLORIDE SERPL-SCNC: 98 MMOL/L (ref 98–107)
CO2 SERPL-SCNC: 28 MMOL/L (ref 23–29)
CREAT BLD-MCNC: 1.2 MG/DL (ref 0.52–1.04)
CREAT SERPL-MCNC: 1.24 MG/DL (ref 0.7–1.3)
GFR SERPL CREATININE-BSD FRML MDRD: 42 ML/MIN/{1.73_M2}
GFR SERPL CREATININE-BSD FRML MDRD: 44 ML/MIN/{1.73_M2}
GLUCOSE SERPL-MCNC: 82 MG/DL (ref 70–105)
POTASSIUM SERPL-SCNC: 4.2 MMOL/L (ref 3.5–5.1)
SODIUM SERPL-SCNC: 134 MMOL/L (ref 136–145)

## 2019-07-12 PROCEDURE — 25000128 H RX IP 250 OP 636: Performed by: INTERNAL MEDICINE

## 2019-07-12 PROCEDURE — 80048 BASIC METABOLIC PNL TOTAL CA: CPT | Performed by: INTERNAL MEDICINE

## 2019-07-12 PROCEDURE — 71275 CT ANGIOGRAPHY CHEST: CPT

## 2019-07-12 PROCEDURE — 82565 ASSAY OF CREATININE: CPT

## 2019-07-12 PROCEDURE — 36415 COLL VENOUS BLD VENIPUNCTURE: CPT | Performed by: INTERNAL MEDICINE

## 2019-07-12 RX ORDER — DIPHENHYDRAMINE HCL 25 MG
25 CAPSULE ORAL
Status: DISCONTINUED | OUTPATIENT
Start: 2019-07-12 | End: 2019-07-13 | Stop reason: HOSPADM

## 2019-07-12 RX ORDER — METHYLPREDNISOLONE SODIUM SUCCINATE 125 MG/2ML
125 INJECTION, POWDER, LYOPHILIZED, FOR SOLUTION INTRAMUSCULAR; INTRAVENOUS
Status: DISCONTINUED | OUTPATIENT
Start: 2019-07-12 | End: 2019-07-13 | Stop reason: HOSPADM

## 2019-07-12 RX ORDER — ACYCLOVIR 200 MG/1
0-1 CAPSULE ORAL
Status: DISCONTINUED | OUTPATIENT
Start: 2019-07-12 | End: 2019-07-13 | Stop reason: HOSPADM

## 2019-07-12 RX ORDER — ONDANSETRON 2 MG/ML
4 INJECTION INTRAMUSCULAR; INTRAVENOUS
Status: DISCONTINUED | OUTPATIENT
Start: 2019-07-12 | End: 2019-07-13 | Stop reason: HOSPADM

## 2019-07-12 RX ORDER — DIPHENHYDRAMINE HYDROCHLORIDE 50 MG/ML
25-50 INJECTION INTRAMUSCULAR; INTRAVENOUS
Status: DISCONTINUED | OUTPATIENT
Start: 2019-07-12 | End: 2019-07-13 | Stop reason: HOSPADM

## 2019-07-12 RX ORDER — IOPAMIDOL 755 MG/ML
500 INJECTION, SOLUTION INTRAVASCULAR ONCE
Status: COMPLETED | OUTPATIENT
Start: 2019-07-12 | End: 2019-07-12

## 2019-07-12 RX ADMIN — IOPAMIDOL 90 ML: 755 INJECTION, SOLUTION INTRAVENOUS at 10:55

## 2019-07-12 RX ADMIN — SODIUM CHLORIDE 100 ML: 9 INJECTION, SOLUTION INTRAVENOUS at 10:55

## 2019-07-20 DIAGNOSIS — F41.9 ANXIETY AND DEPRESSION: ICD-10-CM

## 2019-07-20 DIAGNOSIS — F32.A ANXIETY AND DEPRESSION: ICD-10-CM

## 2019-07-22 RX ORDER — HYDROXYZINE HYDROCHLORIDE 25 MG/1
25 TABLET, FILM COATED ORAL EVERY 6 HOURS PRN
Qty: 90 TABLET | Refills: 0 | Status: SHIPPED | OUTPATIENT
Start: 2019-07-22 | End: 2019-08-19

## 2019-07-22 NOTE — TELEPHONE ENCOUNTER
"Requested Prescriptions   Pending Prescriptions Disp Refills     hydrOXYzine (ATARAX) 25 MG tablet [Pharmacy Med Name: HYDROXYZINE HCL 25 MG TABLET] 90 tablet 0     Sig: TAKE 1 TABLET (25 MG) BY MOUTH EVERY 6 HOURS AS NEEDED FOR ANXIETY       Last Refill:    Disp Refills Start End RIKKI   hydrOXYzine (ATARAX) 25 MG tablet 90 tablet 0 6/26/2019  No   Sig - Route: Take 1 tablet (25 mg) by mouth every 6 hours as needed for anxiety - Oral     Antihistamines Protocol Passed - 7/22/2019  8:42 AM        Passed - Recent (12 mo) or future (30 days) visit within the authorizing provider's specialty     Patient had office visit in the last 12 months or has a visit in the next 30 days with authorizing provider or within the authorizing provider's specialty.  See \"Patient Info\" tab in inbasket, or \"Choose Columns\" in Meds & Orders section of the refill encounter.      LOV: 06/26/2019          Passed - Patient is age 3 or older     Apply age and/or weight-based dosing for peds patients age 3 and older.    Forward request to provider for patients under the age of 3.          Passed - Medication is active on med list        Refilled per RN Protocol.     Sara Williamson RN  [facility]    "

## 2019-07-24 ENCOUNTER — OFFICE VISIT (OUTPATIENT)
Dept: CARDIOLOGY | Facility: CLINIC | Age: 74
End: 2019-07-24
Attending: INTERNAL MEDICINE
Payer: COMMERCIAL

## 2019-07-24 VITALS
HEIGHT: 64 IN | SYSTOLIC BLOOD PRESSURE: 109 MMHG | WEIGHT: 166.3 LBS | HEART RATE: 72 BPM | BODY MASS INDEX: 28.39 KG/M2 | DIASTOLIC BLOOD PRESSURE: 74 MMHG

## 2019-07-24 DIAGNOSIS — I77.810 MILD ASCENDING AORTA DILATATION (H): ICD-10-CM

## 2019-07-24 DIAGNOSIS — I35.0 AORTIC VALVE STENOSIS, ETIOLOGY OF CARDIAC VALVE DISEASE UNSPECIFIED: Primary | ICD-10-CM

## 2019-07-24 PROCEDURE — 93000 ELECTROCARDIOGRAM COMPLETE: CPT | Performed by: PHYSICIAN ASSISTANT

## 2019-07-24 PROCEDURE — 99214 OFFICE O/P EST MOD 30 MIN: CPT | Mod: 25 | Performed by: PHYSICIAN ASSISTANT

## 2019-07-24 ASSESSMENT — MIFFLIN-ST. JEOR: SCORE: 1234.33

## 2019-07-24 NOTE — PROGRESS NOTES
Primary Cardiologist: Dr. Ugarte    Reason for Visit: Review results of CTA and schedule ALEJANDRA for further evaluation of aortic valve    History of Present Illness:   This is a very pleasant 73-year-old female with past medical history notable for moderately severe aortic valve stenosis (mean gradient of 25 mmHg with a valve area of .98 cm  with peak transaortic velocity of 3.4 m/s in the setting of preserved LVEF; dimensionless index of 0.29), mild ascending aortic dilatation, alcohol dependence (drinks 3 to 4 glasses of hard liquor every day; she was referred to chemical dependency recently by her primary care provider), history of SVT, minimal nonobstructive CAD, and chronic mild thrombocytopenia.    She was last seen in cardiology clinic by Dr. Ugarte in 12/2018 and at that time she was recommended to follow-up with us in 4 to 5 months for consideration of transesophageal echocardiogram to relook at her aortic valve as well as CT angiogram for remeasurement of her known mild ascending aorta.  She was also initiated on low-dose atenolol.    She returns to clinic today, stating she continues to have no significant symptoms.  She denies chest discomfort, shortness of breath, presyncope, or syncope.  She does notice some exercise intolerance.    Assessment and Plan:   This is a very pleasant 73-year-old female with past medical history notable for moderately severe aortic valve stenosis (mean gradient of 25 mmHg with a valve area of .98 cm  with peak transaortic velocity of 3.4 m/s in the setting of preserved LVEF; dimensionless index of 0.29), mild ascending aortic dilatation, alcohol dependence (drinks 3 to 4 glasses of hard liquor every day; she was referred to chemical dependency recently by her primary care provider), history of SVT, minimal nonobstructive CAD, and chronic mild thrombocytopenia.    CTA confirms that she does have mild a sending aorta dilatation measuring 4.1 cm.  This correlates with her  echocardiogram findings.  We will continue with serial echocardiograms for further monitoring of this.    All risks and benefits for transesophageal echocardiogram (ALEJANDRA). The risk of ALEJANDRA included but not limited to damage to the oral cavity, esophageal perforation, GI bleeding, pharyngeal hematoma, transient bronchospasm, transient hypoxia, arrhthymias (NSVT, transient atrial fibrillation), vomiting, hemoptysis, and complications from anesthesia. Patient understands and wishes to proceed with it. A formal consent form will be signed by the procedural physician.    She will schedule this sometime in the next 1 to 2 weeks.  Dr. Ugarte will review the results of this test and will make further recommendations on when/if valve replacement as needed.    Thank you for allowing me to participate in the care of this pleasant patient today.      This note was completed in part using Dragon voice recognition software. Although reviewed after completion, some word and grammatical errors may occur.    Orders this Visit:  Orders Placed This Encounter   Procedures     EKG 12-lead complete w/read - Clinics (performed today)     No orders of the defined types were placed in this encounter.    There are no discontinued medications.      Encounter Diagnosis   Name Primary?     Aortic stenosis        CURRENT MEDICATIONS:  Current Outpatient Medications   Medication Sig Dispense Refill     atenolol (TENORMIN) 25 MG tablet Take 1 tablet (25 mg) by mouth every morning 100 tablet 4     Cyanocobalamin (B-12) 1000 MCG TBCR Take 1,000 mcg by mouth daily 100 tablet 1     diphenhydrAMINE (BENADRYL) 25 MG capsule Take 25 mg by mouth nightly as needed       escitalopram (LEXAPRO) 10 MG tablet Take 1 tablet (10 mg) by mouth daily 90 tablet 0     folic acid (FOLVITE) 1 MG tablet Take 1 tablet (1 mg) by mouth daily 30 tablet      furosemide (LASIX) 20 MG tablet Take 1 tablet (20 mg) by mouth as needed (leg edema) 30 tablet 4     gabapentin  (NEURONTIN) 300 MG capsule Take 1 capsule (300 mg) by mouth 3 times daily 90 capsule 1     hydrOXYzine (ATARAX) 25 MG tablet TAKE 1 TABLET (25 MG) BY MOUTH EVERY 6 HOURS AS NEEDED FOR ANXIETY 90 tablet 0     magnesium oxide (MAG-OX) 400 MG tablet Take 1 tablet (400 mg) by mouth daily 7 tablet      melatonin 3 MG tablet Take 9 mg by mouth nightly as needed for sleep        Multiple Vitamins-Minerals (CENTRUM SILVER) per tablet Take 1 tablet by mouth daily       polyethylene glycol (MIRALAX/GLYCOLAX) Packet Take 17 g by mouth daily as needed (constipation) 7 packet      senna-docusate (SENOKOT-S;PERICOLACE) 8.6-50 MG per tablet Take 1-2 tablets by mouth 2 times daily as needed for constipation 100 tablet      spironolactone (ALDACTONE) 100 MG tablet Take 1 tablet (100 mg) by mouth daily 90 tablet 0     traZODone (DESYREL) 100 MG tablet TAKE 1 TABLET (100 MG) BY MOUTH NIGHTLY AS NEEDED FOR SLEEP 90 tablet 1     valACYclovir (VALTREX) 1000 mg tablet TAKE 2 TABS BY MOUTH EVERY 12 HOURS FOR 1 DAY ONLY FOR OUTBREAKS OF COLD SORES AS NEEDED 4 tablet 2     ASPIRIN NOT PRESCRIBED (INTENTIONAL) Please choose reason not prescribed, below       baclofen (LIORESAL) 10 MG tablet TAKE 0.5 TABLET (5 MG) BY MOUTH 2 TIMES DAILY AS NEEDED FOR MUSCLE SPASMS (Patient not taking: Reported on 7/24/2019) 90 tablet 0     furosemide (LASIX) 20 MG tablet TAKE 1 TABLET BY MOUTH EVERY DAY (Patient not taking: Reported on 7/24/2019) 90 tablet 0     HUMIRA PEN-PS/UV/ADOL HS START 40 MG/0.8ML pen kit   0     ketoconazole (NIZORAL) 2 % shampoo Apply to the affected area and wash off after 5 minutes. (Patient not taking: Reported on 7/24/2019) 120 mL 1     STATIN NOT PRESCRIBED, INTENTIONAL, Please choose reason not prescribed, below       sucralfate (CARAFATE) 1 GM/10ML suspension Take 10 mLs (1 g) by mouth 4 times daily (before meals and nightly) Pt takes once daily (Patient not taking: Reported on 7/24/2019) 1200 mL 1     thiamine 100 MG tablet  Take 1 tablet (100 mg) by mouth daily         ALLERGIES     Allergies   Allergen Reactions     Bactrim [Sulfamethoxazole W/Trimethoprim] Hives     Codeine Itching     NAUSEA     Morphine Itching     NAUSEA       PAST MEDICAL HISTORY:  Past Medical History:   Diagnosis Date     Alcohol abuse     long term alcohol abuse     Anxiety disorder      Ascending aorta dilatation (H)      Bariatric surgery status 1996?    gastric bypass, [facility] [employer] and     Benign hypertension      Chronic insomnia      Chronic pain syndrome     Chronic back and neck pain, chronic pain due to osteoarthritis multiple joints     Coronary artery disease 9/2015    mild distal branch disease on cath     Coronary artery disease involving native coronary artery of native heart without angina pectoris 10/16/2018    Minimal coronary artery disease on coronary angiogram in 2015.      Hip joint replacement status 4/2004    right     Knee joint replacement status 12/2005    left     Macrocytic anemia     Mild macrocytic anemia, 2012 to present, likely based on alcohol abuse.     Major depressive disorder, single episode, severe, without mention of psychotic behavior      Mixed hyperlipidemia      Moderate aortic stenosis 5/2014    mild/mod AS with peak gradient 33/mean gradient 20 mmHg, AV area 1.2     Pelvic relaxation disorder     Surgical intervention for cystocele/rectocele 3,11/2012     Personal history of urinary calculi 6/2006    left ureteral stone,lithotripsy     PVC (premature ventricular contraction)      Sinus tachycardia      Stage III chronic kidney disease (H) 2005     SVT (supraventricular tachycardia) (H)     likely atrial tachycardia       PAST SURGICAL HISTORY:  Past Surgical History:   Procedure Laterality Date     APPENDECTOMY  3/2004    incidental     C GASTRIC BYPASS,OBESE<100CM ARIANNA-EN-Y  1996     C MEDIASTINOSCOPY W OR WO BIOPSY  2/2008    Videomediastinoscopy and, for mediastinal adenopathy -reactive lymphoid hyperplasia     C  REPAIR OF RECTOCELE  3/2012     C TOTAL KNEE ARTHROPLASTY  12/2005    left      CARPAL TUNNEL RELEASE RT/LT  10/2010    Carpometacarpal excisional arthroplasty with a fascial autograft and APL suspension sling (81277). 2. Left thumb metacarpophalangeal joint fusion with autologous bone graft ([id number]). 3. Left endoscopic carpal tunnel release      CHOLECYSTECTOMY, LAPOROSCOPIC  11/2010    Cholecystectomy, Laparoscopic     COLONOSCOPY N/A 9/8/2016    Procedure: COMBINED COLONOSCOPY, SINGLE OR MULTIPLE BIOPSY/POLYPECTOMY BY BIOPSY;  Surgeon: Moe Barlow MD;  Location:  GI     CYSTOCELE REPAIR  11/2012    davinci laparoscopic sacrocolpopexy, enterocele repair, lysis of adhesions, placement of retropubic mid urethral sling, cystoscopy     CYSTOSCOPY, LITHOTRIPSY, COMBINED  6/2006    Left extracorporeal shock wave lithotripsy, cystoscopy, left ureteral stent placement.     CYSTOSCOPY, REMOVE STENT(S), COMBINED  7/2006    Cystoscopy, removal of left ureteral stent, retrograde pyelography, flexible and rigid ureteroscopy and holmium laser lithotripsy, basket removal of stone fragments, ureteral stent placement.      ENDOSCOPIC ULTRASOUND UPPER GASTROINTESTINAL TRACT (GI) N/A 6/12/2017    Procedure: ENDOSCOPIC ULTRASOUND, ESOPHAGOSCOPY / UPPER GASTROINTESTINAL TRACT (GI);  ENDOSCOPIC ULTRASOUND, ESOPHAGOSCOPY / UPPER GASTROINTESTINAL TRACT (GI);  Surgeon: Parth Graham MD;  Location:  GI     HERNIA REPAIR  4/2012    bilateral augmentation mastopexy, ventral hernia repair, and medial thigh liposuction on 04/06/2012.      HYSTERECTOMY VAGINAL, BILATERAL SALPINGO-OOPHERECTOMY, COMBINED  1998    due to myoma and bleeding     JOINT REPLACEMENT, HIP RT/LT  4/2004    right total hip arthroplasty     LAPAROTOMY, LYSIS ADHESIONS, COMBINED  3/2004    lysis adhesions, ventral hernia repair, appendectomy incidentally     LYMPH NODE BIOPSY  4/2008    right axillary, reactive follicular and paracortical hyperplasia.      "MAMMOPLASTY AUGMENTATION BILATERAL  4/2012     REVISE RECONSTRUCTED BREAST  6/7/2012    Left breast capsulotomy.        FAMILY HISTORY:  Family History   Problem Relation Age of Onset     Substance Abuse Father      Cancer Father         throat and lung mets     Diabetes No family hx of      Coronary Artery Disease No family hx of      Cerebrovascular Disease No family hx of        SOCIAL HISTORY:  Social History     Socioeconomic History     Marital status:      Spouse name: Mt     Number of children: 4     Years of education: 18     Highest education level: None   Occupational History     Occupation: nurse     Employer: [employer]     Employer: RETIRED   Social Needs     Financial resource strain: None     Food insecurity:     Worry: None     Inability: None     Transportation needs:     Medical: None     Non-medical: None   Tobacco Use     Smoking status: Never Smoker     Smokeless tobacco: Never Used   Substance and Sexual Activity     Alcohol use: Yes     Alcohol/week: 37.8 oz     Types: 63 Standard drinks or equivalent per week     Comment: three \"3oz\" liquor drinks nightly     Drug use: No     Sexual activity: Yes     Partners: Male   Lifestyle     Physical activity:     Days per week: None     Minutes per session: None     Stress: None   Relationships     Social connections:     Talks on phone: None     Gets together: None     Attends Orthodoxy service: None     Active member of club or organization: None     Attends meetings of clubs or organizations: None     Relationship status: None     Intimate partner violence:     Fear of current or ex partner: None     Emotionally abused: None     Physically abused: None     Forced sexual activity: None   Other Topics Concern      Service Not Asked     Blood Transfusions No     Caffeine Concern Yes     Comment: 1-2 cups per day      Occupational Exposure Yes     Comment: blood     Hobby Hazards No     Sleep Concern Yes     Stress Concern Yes     Weight " "Concern Yes     Comment: gastric  byepass     Special Diet No     Back Care No     Exercise Yes     Comment: walk, swin     Bike Helmet No     Seat Belt Yes     Self-Exams Yes     Parent/sibling w/ CABG, MI or angioplasty before 65F 55M? Not Asked   Social History Narrative     None       Review of Systems:  Skin:  Negative     Eyes:  Positive for glasses  ENT:  Negative    Respiratory:  Negative dyspnea on exertion;shortness of breath  Cardiovascular:  Negative    Gastroenterology: Negative    Genitourinary:  Negative    Musculoskeletal:  Positive for arthritis  Neurologic:  Negative    Psychiatric:  Positive for sleep disturbances(4-5 hrs per night)  Heme/Lymph/Imm:  Positive for    Endocrine:  Negative      Physical Exam:  Vitals: /74   Pulse 72   Ht 1.626 m (5' 4\")   Wt 75.4 kg (166 lb 4.8 oz)   BMI 28.55 kg/m       GEN:  NAD  NECK: No JVD  C/V:  Regular rate and rhythm with 3/6 DAVID at Right and Left sternal borders  RESP: Clear to auscultation bilaterally without wheezing, rales, or rhonchi.  GI: Abdomen soft, nontender, nondistended. No HSM appreciated.   EXTREM: No LE edema.   NEURO: Alert and oriented, cooperative. No obvious focal deficits.   PSYCH: Normal affect.  SKIN: Warm and dry.       Recent Lab Results:  LIPID RESULTS:  Lab Results   Component Value Date    CHOL 220 (H) 06/26/2019    HDL 99 06/26/2019     (H) 06/26/2019    TRIG 62 06/26/2019    CHOLHDLRATIO 1.5 07/28/2015       LIVER ENZYME RESULTS:  Lab Results   Component Value Date    AST 60 (H) 06/26/2019    ALT 38 06/26/2019       CBC RESULTS:  Lab Results   Component Value Date    WBC 13.8 (H) 06/26/2019    RBC 3.69 (L) 06/26/2019    HGB 13.2 06/26/2019    HCT 36.9 06/26/2019     06/26/2019    MCH 35.8 (H) 06/26/2019    MCHC 35.8 06/26/2019    RDW 12.3 06/26/2019     06/26/2019       BMP RESULTS:  Lab Results   Component Value Date     (L) 07/12/2019    POTASSIUM 4.2 07/12/2019    CHLORIDE 98 07/12/2019    " CO2 28 07/12/2019    ANIONGAP 12.2 07/12/2019    GLC 82 07/12/2019    BUN 11 07/12/2019    CR 1.24 07/12/2019    GFRESTIMATED 42 (L) 07/12/2019    GFRESTBLACK 51 (L) 07/12/2019    BIRGIT 10.3 07/12/2019        A1C RESULTS:  Lab Results   Component Value Date    A1C 5.7 09/29/2010       INR RESULTS:  Lab Results   Component Value Date    INR 1.03 06/16/2017    INR 1.15 (H) 06/11/2017           Michael Kinsey PA-C   July 24, 2019

## 2019-07-24 NOTE — PATIENT INSTRUCTIONS
If you have questions or concerns please call my nurse team at [phone].     Scheduling phone number: [phone]  Reminder: Please bring in all current medications, over the counter supplements and vitamin bottles to your next appointment.    It was a pleasure seeing you today!     Michael Kinsey  7/24/2019

## 2019-07-24 NOTE — LETTER
7/24/2019    Nayeli Castorena PA-C  [address] [facility] [address]    RE: Kavitha Headley       Dear Colleague,    I had the pleasure of seeing Kavitha Headley in the [facility].    Primary Cardiologist: Dr. Ugarte    Reason for Visit: Review results of CTA and schedule ALEJANDRA for further evaluation of aortic valve    History of Present Illness:   This is a very pleasant 73-year-old female with past medical history notable for moderately severe aortic valve stenosis (mean gradient of 25 mmHg with a valve area of .98 cm  with peak transaortic velocity of 3.4 m/s in the setting of preserved LVEF; dimensionless index of 0.29), mild ascending aortic dilatation, alcohol dependence (drinks 3 to 4 glasses of hard liquor every day; she was referred to chemical dependency recently by her primary care provider), history of SVT, minimal nonobstructive CAD, and chronic mild thrombocytopenia.    She was last seen in cardiology clinic by Dr. Ugarte in 12/2018 and at that time she was recommended to follow-up with us in 4 to 5 months for consideration of transesophageal echocardiogram to relook at her aortic valve as well as CT angiogram for remeasurement of her known mild ascending aorta.  She was also initiated on low-dose atenolol.    She returns to clinic today, stating she continues to have no significant symptoms.  She denies chest discomfort, shortness of breath, presyncope, or syncope.  She does notice some exercise intolerance.    Assessment and Plan:   This is a very pleasant 73-year-old female with past medical history notable for moderately severe aortic valve stenosis (mean gradient of 25 mmHg with a valve area of .98 cm  with peak transaortic velocity of 3.4 m/s in the setting of preserved LVEF; dimensionless index of 0.29), mild ascending aortic dilatation, alcohol dependence (drinks 3 to 4 glasses of hard liquor every day; she was referred to chemical dependency recently by  her primary care provider), history of SVT, minimal nonobstructive CAD, and chronic mild thrombocytopenia.    CTA confirms that she does have mild a sending aorta dilatation measuring 4.1 cm.  This correlates with her echocardiogram findings.  We will continue with serial echocardiograms for further monitoring of this.    All risks and benefits for transesophageal echocardiogram (ALEJANDRA). The risk of ALEJANDRA included but not limited to damage to the oral cavity, esophageal perforation, GI bleeding, pharyngeal hematoma, transient bronchospasm, transient hypoxia, arrhthymias (NSVT, transient atrial fibrillation), vomiting, hemoptysis, and complications from anesthesia. Patient understands and wishes to proceed with it. A formal consent form will be signed by the procedural physician.    She will schedule this sometime in the next 1 to 2 weeks.  Dr. Ugarte will review the results of this test and will make further recommendations on when/if valve replacement as needed.    Thank you for allowing me to participate in the care of this pleasant patient today.      This note was completed in part using Dragon voice recognition software. Although reviewed after completion, some word and grammatical errors may occur.    Orders this Visit:  Orders Placed This Encounter   Procedures     EKG 12-lead complete w/read - Clinics (performed today)     No orders of the defined types were placed in this encounter.    There are no discontinued medications.      Encounter Diagnosis   Name Primary?     Aortic stenosis        CURRENT MEDICATIONS:  Current Outpatient Medications   Medication Sig Dispense Refill     atenolol (TENORMIN) 25 MG tablet Take 1 tablet (25 mg) by mouth every morning 100 tablet 4     Cyanocobalamin (B-12) 1000 MCG TBCR Take 1,000 mcg by mouth daily 100 tablet 1     diphenhydrAMINE (BENADRYL) 25 MG capsule Take 25 mg by mouth nightly as needed       escitalopram (LEXAPRO) 10 MG tablet Take 1 tablet (10 mg) by mouth daily 90  tablet 0     folic acid (FOLVITE) 1 MG tablet Take 1 tablet (1 mg) by mouth daily 30 tablet      furosemide (LASIX) 20 MG tablet Take 1 tablet (20 mg) by mouth as needed (leg edema) 30 tablet 4     gabapentin (NEURONTIN) 300 MG capsule Take 1 capsule (300 mg) by mouth 3 times daily 90 capsule 1     hydrOXYzine (ATARAX) 25 MG tablet TAKE 1 TABLET (25 MG) BY MOUTH EVERY 6 HOURS AS NEEDED FOR ANXIETY 90 tablet 0     magnesium oxide (MAG-OX) 400 MG tablet Take 1 tablet (400 mg) by mouth daily 7 tablet      melatonin 3 MG tablet Take 9 mg by mouth nightly as needed for sleep        Multiple Vitamins-Minerals (CENTRUM SILVER) per tablet Take 1 tablet by mouth daily       polyethylene glycol (MIRALAX/GLYCOLAX) Packet Take 17 g by mouth daily as needed (constipation) 7 packet      senna-docusate (SENOKOT-S;PERICOLACE) 8.6-50 MG per tablet Take 1-2 tablets by mouth 2 times daily as needed for constipation 100 tablet      spironolactone (ALDACTONE) 100 MG tablet Take 1 tablet (100 mg) by mouth daily 90 tablet 0     traZODone (DESYREL) 100 MG tablet TAKE 1 TABLET (100 MG) BY MOUTH NIGHTLY AS NEEDED FOR SLEEP 90 tablet 1     valACYclovir (VALTREX) 1000 mg tablet TAKE 2 TABS BY MOUTH EVERY 12 HOURS FOR 1 DAY ONLY FOR OUTBREAKS OF COLD SORES AS NEEDED 4 tablet 2     ASPIRIN NOT PRESCRIBED (INTENTIONAL) Please choose reason not prescribed, below       baclofen (LIORESAL) 10 MG tablet TAKE 0.5 TABLET (5 MG) BY MOUTH 2 TIMES DAILY AS NEEDED FOR MUSCLE SPASMS (Patient not taking: Reported on 7/24/2019) 90 tablet 0     furosemide (LASIX) 20 MG tablet TAKE 1 TABLET BY MOUTH EVERY DAY (Patient not taking: Reported on 7/24/2019) 90 tablet 0     HUMIRA PEN-PS/UV/ADOL HS START 40 MG/0.8ML pen kit   0     ketoconazole (NIZORAL) 2 % shampoo Apply to the affected area and wash off after 5 minutes. (Patient not taking: Reported on 7/24/2019) 120 mL 1     STATIN NOT PRESCRIBED, INTENTIONAL, Please choose reason not prescribed, below        sucralfate (CARAFATE) 1 GM/10ML suspension Take 10 mLs (1 g) by mouth 4 times daily (before meals and nightly) Pt takes once daily (Patient not taking: Reported on 7/24/2019) 1200 mL 1     thiamine 100 MG tablet Take 1 tablet (100 mg) by mouth daily         ALLERGIES     Allergies   Allergen Reactions     Bactrim [Sulfamethoxazole W/Trimethoprim] Hives     Codeine Itching     NAUSEA     Morphine Itching     NAUSEA       PAST MEDICAL HISTORY:  Past Medical History:   Diagnosis Date     Alcohol abuse     long term alcohol abuse     Anxiety disorder      Ascending aorta dilatation (H)      Bariatric surgery status 1996?    gastric bypass, [facility] and     Benign hypertension      Chronic insomnia      Chronic pain syndrome     Chronic back and neck pain, chronic pain due to osteoarthritis multiple joints     Coronary artery disease 9/2015    mild distal branch disease on cath     Coronary artery disease involving native coronary artery of native heart without angina pectoris 10/16/2018    Minimal coronary artery disease on coronary angiogram in 2015.      Hip joint replacement status 4/2004    right     Knee joint replacement status 12/2005    left     Macrocytic anemia     Mild macrocytic anemia, 2012 to present, likely based on alcohol abuse.     Major depressive disorder, single episode, severe, without mention of psychotic behavior      Mixed hyperlipidemia      Moderate aortic stenosis 5/2014    mild/mod AS with peak gradient 33/mean gradient 20 mmHg, AV area 1.2     Pelvic relaxation disorder     Surgical intervention for cystocele/rectocele 3,11/2012     Personal history of urinary calculi 6/2006    left ureteral stone,lithotripsy     PVC (premature ventricular contraction)      Sinus tachycardia      Stage III chronic kidney disease (H) 2005     SVT (supraventricular tachycardia) (H)     likely atrial tachycardia       PAST SURGICAL HISTORY:  Past Surgical History:   Procedure Laterality Date     APPENDECTOMY   3/2004    incidental     C GASTRIC BYPASS,OBESE<100CM ARIANAN-EN-Y  1996     C MEDIASTINOSCOPY W OR WO BIOPSY  2/2008    Videomediastinoscopy and, for mediastinal adenopathy -reactive lymphoid hyperplasia     C REPAIR OF RECTOCELE  3/2012     C TOTAL KNEE ARTHROPLASTY  12/2005    left      CARPAL TUNNEL RELEASE RT/LT  10/2010    Carpometacarpal excisional arthroplasty with a fascial autograft and APL suspension sling (13005). 2. Left thumb metacarpophalangeal joint fusion with autologous bone graft (59119). 3. Left endoscopic carpal tunnel release      CHOLECYSTECTOMY, LAPOROSCOPIC  11/2010    Cholecystectomy, Laparoscopic     COLONOSCOPY N/A 9/8/2016    Procedure: COMBINED COLONOSCOPY, SINGLE OR MULTIPLE BIOPSY/POLYPECTOMY BY BIOPSY;  Surgeon: Moe Barlow MD;  Location:  GI     CYSTOCELE REPAIR  11/2012    davinc laparoscopic sacrocolpopexy, enterocele repair, lysis of adhesions, placement of retropubic mid urethral sling, cystoscopy     CYSTOSCOPY, LITHOTRIPSY, COMBINED  6/2006    Left extracorporeal shock wave lithotripsy, cystoscopy, left ureteral stent placement.     CYSTOSCOPY, REMOVE STENT(S), COMBINED  7/2006    Cystoscopy, removal of left ureteral stent, retrograde pyelography, flexible and rigid ureteroscopy and holmium laser lithotripsy, basket removal of stone fragments, ureteral stent placement.      ENDOSCOPIC ULTRASOUND UPPER GASTROINTESTINAL TRACT (GI) N/A 6/12/2017    Procedure: ENDOSCOPIC ULTRASOUND, ESOPHAGOSCOPY / UPPER GASTROINTESTINAL TRACT (GI);  ENDOSCOPIC ULTRASOUND, ESOPHAGOSCOPY / UPPER GASTROINTESTINAL TRACT (GI);  Surgeon: Parth Graham MD;  Location:  GI     HERNIA REPAIR  4/2012    bilateral augmentation mastopexy, ventral hernia repair, and medial thigh liposuction on 04/06/2012.      HYSTERECTOMY VAGINAL, BILATERAL SALPINGO-OOPHERECTOMY, COMBINED  1998    due to myoma and bleeding     JOINT REPLACEMENT, HIP RT/LT  4/2004    right total hip arthroplasty     LAPAROTOMY,  "LYSIS ADHESIONS, COMBINED  3/2004    lysis adhesions, ventral hernia repair, appendectomy incidentally     LYMPH NODE BIOPSY  4/2008    right axillary, reactive follicular and paracortical hyperplasia.     MAMMOPLASTY AUGMENTATION BILATERAL  4/2012     REVISE RECONSTRUCTED BREAST  6/7/2012    Left breast capsulotomy.        FAMILY HISTORY:  Family History   Problem Relation Age of Onset     Substance Abuse Father      Cancer Father         throat and lung mets     Diabetes No family hx of      Coronary Artery Disease No family hx of      Cerebrovascular Disease No family hx of        SOCIAL HISTORY:  Social History     Socioeconomic History     Marital status:      Spouse name: Mt     Number of children: 4     Years of education: 18     Highest education level: None   Occupational History     Occupation: nurse     Employer: [employer]     Employer: RETIRED   Social Needs     Financial resource strain: None     Food insecurity:     Worry: None     Inability: None     Transportation needs:     Medical: None     Non-medical: None   Tobacco Use     Smoking status: Never Smoker     Smokeless tobacco: Never Used   Substance and Sexual Activity     Alcohol use: Yes     Alcohol/week: 37.8 oz     Types: 63 Standard drinks or equivalent per week     Comment: three \"3oz\" liquor drinks nightly     Drug use: No     Sexual activity: Yes     Partners: Male   Lifestyle     Physical activity:     Days per week: None     Minutes per session: None     Stress: None   Relationships     Social connections:     Talks on phone: None     Gets together: None     Attends Zoroastrianism service: None     Active member of club or organization: None     Attends meetings of clubs or organizations: None     Relationship status: None     Intimate partner violence:     Fear of current or ex partner: None     Emotionally abused: None     Physically abused: None     Forced sexual activity: None   Other Topics Concern      Service Not Asked " "    Blood Transfusions No     Caffeine Concern Yes     Comment: 1-2 cups per day      Occupational Exposure Yes     Comment: blood     Hobby Hazards No     Sleep Concern Yes     Stress Concern Yes     Weight Concern Yes     Comment: gastric  byepass     Special Diet No     Back Care No     Exercise Yes     Comment: walk, swin     Bike Helmet No     Seat Belt Yes     Self-Exams Yes     Parent/sibling w/ CABG, MI or angioplasty before 65F 55M? Not Asked   Social History Narrative     None       Review of Systems:  Skin:  Negative     Eyes:  Positive for glasses  ENT:  Negative    Respiratory:  Negative dyspnea on exertion;shortness of breath  Cardiovascular:  Negative    Gastroenterology: Negative    Genitourinary:  Negative    Musculoskeletal:  Positive for arthritis  Neurologic:  Negative    Psychiatric:  Positive for sleep disturbances(4-5 hrs per night)  Heme/Lymph/Imm:  Positive for    Endocrine:  Negative      Physical Exam:  Vitals: /74   Pulse 72   Ht 1.626 m (5' 4\")   Wt 75.4 kg (166 lb 4.8 oz)   BMI 28.55 kg/m        GEN:  NAD  NECK: No JVD  C/V:  Regular rate and rhythm with 3/6 DAVID at Right and Left sternal borders  RESP: Clear to auscultation bilaterally without wheezing, rales, or rhonchi.  GI: Abdomen soft, nontender, nondistended. No HSM appreciated.   EXTREM: No LE edema.   NEURO: Alert and oriented, cooperative. No obvious focal deficits.   PSYCH: Normal affect.  SKIN: Warm and dry.       Recent Lab Results:  LIPID RESULTS:  Lab Results   Component Value Date    CHOL 220 (H) 06/26/2019    HDL 99 06/26/2019     (H) 06/26/2019    TRIG 62 06/26/2019    CHOLHDLRATIO 1.5 07/28/2015       LIVER ENZYME RESULTS:  Lab Results   Component Value Date    AST 60 (H) 06/26/2019    ALT 38 06/26/2019       CBC RESULTS:  Lab Results   Component Value Date    WBC 13.8 (H) 06/26/2019    RBC 3.69 (L) 06/26/2019    HGB 13.2 06/26/2019    HCT 36.9 06/26/2019     06/26/2019    MCH 35.8 (H) " 06/26/2019    MCHC 35.8 06/26/2019    RDW 12.3 06/26/2019     06/26/2019       BMP RESULTS:  Lab Results   Component Value Date     (L) 07/12/2019    POTASSIUM 4.2 07/12/2019    CHLORIDE 98 07/12/2019    CO2 28 07/12/2019    ANIONGAP 12.2 07/12/2019    GLC 82 07/12/2019    BUN 11 07/12/2019    CR 1.24 07/12/2019    GFRESTIMATED 42 (L) 07/12/2019    GFRESTBLACK 51 (L) 07/12/2019    BIRGIT 10.3 07/12/2019        A1C RESULTS:  Lab Results   Component Value Date    A1C 5.7 09/29/2010       INR RESULTS:  Lab Results   Component Value Date    INR 1.03 06/16/2017    INR 1.15 (H) 06/11/2017       Thank you for allowing me to participate in the care of your patient.    Sincerely,     Michael Kinsey PA-C     [facility]

## 2019-07-26 ENCOUNTER — TELEPHONE (OUTPATIENT)
Dept: CARDIOLOGY | Facility: CLINIC | Age: 74
End: 2019-07-26

## 2019-07-26 NOTE — TELEPHONE ENCOUNTER
Attempted to contact patient to review pre-ALJEANDRA prep, left message for patient to call back.    1330 spoke with patient's daughter who will provide transportation tomorrow and reviewed prep instructions.   Patient is scheduled for ALEJANDRA on 7/30/19, arriving at 1130 for procedure at 1330. Patient will hold medication lasix and spironolactone. Patient is not diabetic. Patient has no known swallowing disorder or history of esophageal bleeding varices. Patient is aware to be NPO for 6 hours except for medications. Patient has arranged for transportation after the procedure.    Daughter will try to have patient call later today to review prep instructions also.

## 2019-07-30 ENCOUNTER — HOSPITAL ENCOUNTER (OUTPATIENT)
Dept: CARDIOLOGY | Facility: CLINIC | Age: 74
End: 2019-07-30
Attending: INTERNAL MEDICINE | Admitting: INTERNAL MEDICINE
Payer: COMMERCIAL

## 2019-07-30 ENCOUNTER — HOSPITAL ENCOUNTER (OUTPATIENT)
Facility: CLINIC | Age: 74
Discharge: HOME | End: 2019-07-30
Attending: INTERNAL MEDICINE | Admitting: INTERNAL MEDICINE
Payer: COMMERCIAL

## 2019-07-30 VITALS
WEIGHT: 166.3 LBS | BODY MASS INDEX: 28.39 KG/M2 | DIASTOLIC BLOOD PRESSURE: 56 MMHG | TEMPERATURE: 98.5 F | HEIGHT: 64 IN | HEART RATE: 59 BPM | SYSTOLIC BLOOD PRESSURE: 101 MMHG | OXYGEN SATURATION: 95 % | RESPIRATION RATE: 16 BRPM

## 2019-07-30 DIAGNOSIS — Q23.1 CONGENITAL INSUFFICIENCY OF AORTIC VALVE: ICD-10-CM

## 2019-07-30 DIAGNOSIS — I35.0 NONRHEUMATIC AORTIC VALVE STENOSIS: ICD-10-CM

## 2019-07-30 PROCEDURE — 93320 DOPPLER ECHO COMPLETE: CPT | Mod: 26 | Performed by: INTERNAL MEDICINE

## 2019-07-30 PROCEDURE — 40000235 ZZH STATISTIC TELEMETRY

## 2019-07-30 PROCEDURE — 25800030 ZZH RX IP 258 OP 636: Performed by: INTERNAL MEDICINE

## 2019-07-30 PROCEDURE — 25000128 H RX IP 250 OP 636: Performed by: INTERNAL MEDICINE

## 2019-07-30 PROCEDURE — 93312 ECHO TRANSESOPHAGEAL: CPT | Mod: 26 | Performed by: INTERNAL MEDICINE

## 2019-07-30 PROCEDURE — 40000857 ZZH STATISTIC TEE INCLUDES SEDATION

## 2019-07-30 PROCEDURE — 25000125 ZZHC RX 250: Performed by: INTERNAL MEDICINE

## 2019-07-30 PROCEDURE — 93325 DOPPLER ECHO COLOR FLOW MAPG: CPT | Mod: 26 | Performed by: INTERNAL MEDICINE

## 2019-07-30 PROCEDURE — 93320 DOPPLER ECHO COMPLETE: CPT

## 2019-07-30 RX ORDER — SODIUM CHLORIDE 9 MG/ML
INJECTION, SOLUTION INTRAVENOUS CONTINUOUS PRN
Status: DISCONTINUED | OUTPATIENT
Start: 2019-07-30 | End: 2019-07-31 | Stop reason: HOSPADM

## 2019-07-30 RX ORDER — FENTANYL CITRATE 50 UG/ML
25 INJECTION, SOLUTION INTRAMUSCULAR; INTRAVENOUS
Status: DISCONTINUED | OUTPATIENT
Start: 2019-07-30 | End: 2019-07-30 | Stop reason: HOSPADM

## 2019-07-30 RX ORDER — LIDOCAINE 40 MG/G
CREAM TOPICAL
Status: DISCONTINUED | OUTPATIENT
Start: 2019-07-30 | End: 2019-07-31 | Stop reason: HOSPADM

## 2019-07-30 RX ORDER — FENTANYL CITRATE 50 UG/ML
25 INJECTION, SOLUTION INTRAMUSCULAR; INTRAVENOUS
Status: DISCONTINUED | OUTPATIENT
Start: 2019-07-30 | End: 2019-07-31 | Stop reason: HOSPADM

## 2019-07-30 RX ORDER — NALOXONE HYDROCHLORIDE 0.4 MG/ML
.1-.4 INJECTION, SOLUTION INTRAMUSCULAR; INTRAVENOUS; SUBCUTANEOUS
Status: DISCONTINUED | OUTPATIENT
Start: 2019-07-30 | End: 2019-07-31 | Stop reason: HOSPADM

## 2019-07-30 RX ORDER — FLUMAZENIL 0.1 MG/ML
0.2 INJECTION, SOLUTION INTRAVENOUS
Status: DISCONTINUED | OUTPATIENT
Start: 2019-07-30 | End: 2019-07-30 | Stop reason: HOSPADM

## 2019-07-30 RX ORDER — FLUMAZENIL 0.1 MG/ML
0.2 INJECTION, SOLUTION INTRAVENOUS
Status: DISCONTINUED | OUTPATIENT
Start: 2019-07-30 | End: 2019-07-31 | Stop reason: HOSPADM

## 2019-07-30 RX ORDER — SODIUM CHLORIDE 9 MG/ML
INJECTION, SOLUTION INTRAVENOUS CONTINUOUS PRN
Status: DISCONTINUED | OUTPATIENT
Start: 2019-07-30 | End: 2019-07-30 | Stop reason: HOSPADM

## 2019-07-30 RX ORDER — GLYCOPYRROLATE 0.2 MG/ML
0.1 INJECTION, SOLUTION INTRAMUSCULAR; INTRAVENOUS ONCE
Status: COMPLETED | OUTPATIENT
Start: 2019-07-30 | End: 2019-07-30

## 2019-07-30 RX ORDER — NALOXONE HYDROCHLORIDE 0.4 MG/ML
.1-.4 INJECTION, SOLUTION INTRAMUSCULAR; INTRAVENOUS; SUBCUTANEOUS
Status: DISCONTINUED | OUTPATIENT
Start: 2019-07-30 | End: 2019-07-30 | Stop reason: HOSPADM

## 2019-07-30 RX ORDER — LIDOCAINE 50 MG/G
0.5 OINTMENT TOPICAL ONCE
Status: COMPLETED | OUTPATIENT
Start: 2019-07-30 | End: 2019-07-30

## 2019-07-30 RX ORDER — LIDOCAINE 40 MG/G
CREAM TOPICAL
Status: DISCONTINUED | OUTPATIENT
Start: 2019-07-30 | End: 2019-07-30 | Stop reason: HOSPADM

## 2019-07-30 RX ORDER — GLYCOPYRROLATE 0.2 MG/ML
0.1 INJECTION, SOLUTION INTRAMUSCULAR; INTRAVENOUS ONCE
Status: DISCONTINUED | OUTPATIENT
Start: 2019-07-30 | End: 2019-07-31 | Stop reason: HOSPADM

## 2019-07-30 RX ORDER — FENTANYL CITRATE 50 UG/ML
25 INJECTION, SOLUTION INTRAMUSCULAR; INTRAVENOUS ONCE
Status: DISCONTINUED | OUTPATIENT
Start: 2019-07-30 | End: 2019-07-30 | Stop reason: HOSPADM

## 2019-07-30 RX ORDER — FENTANYL CITRATE 50 UG/ML
25 INJECTION, SOLUTION INTRAMUSCULAR; INTRAVENOUS ONCE
Status: COMPLETED | OUTPATIENT
Start: 2019-07-30 | End: 2019-07-30

## 2019-07-30 RX ADMIN — GLYCOPYRROLATE 0.01 MG: 0.2 INJECTION, SOLUTION INTRAMUSCULAR; INTRAVENOUS at 13:16

## 2019-07-30 RX ADMIN — TOPICAL ANESTHETIC 0.5 ML: 200 SPRAY DENTAL; PERIODONTAL at 14:08

## 2019-07-30 RX ADMIN — SODIUM CHLORIDE: 9 INJECTION, SOLUTION INTRAVENOUS at 13:26

## 2019-07-30 RX ADMIN — MIDAZOLAM 1 MG: 1 INJECTION INTRAMUSCULAR; INTRAVENOUS at 14:17

## 2019-07-30 RX ADMIN — MIDAZOLAM 1 MG: 1 INJECTION INTRAMUSCULAR; INTRAVENOUS at 14:16

## 2019-07-30 RX ADMIN — LIDOCAINE 0.5 G: 50 OINTMENT TOPICAL at 13:20

## 2019-07-30 RX ADMIN — FENTANYL CITRATE 50 MCG: 50 INJECTION, SOLUTION INTRAMUSCULAR; INTRAVENOUS at 14:12

## 2019-07-30 ASSESSMENT — MIFFLIN-ST. JEOR: SCORE: 1234.58

## 2019-07-30 NOTE — PROGRESS NOTES
Care Suites Admission Nursing Note    Reason for admission: ALEJANDRA  CS arrival time: 1200  Accompanied by: daughter  Name/phone of DC : Nery- [phone]  Medications held: n/a  Consent signed: not at this time, with MD  Abnormal assessment/labs: none  If abnormal, provider notified: n/a  Education/questions answered: yes, discharge instructions given to pt and daughter, both state understanding.  Plan: ALEJANDRA  No dentures, no diff swallowing or esophageal disorder.    Pt has several alcoholic drinks on a daily basis.  No sleep apnea.

## 2019-07-30 NOTE — DISCHARGE INSTRUCTIONS
ALEJANDRA  (Transesophageal Echocardiogram)  Discharge Instructions    After you go home:      Have an adult stay with you for 6 hours.       For 24 hours - due to the sedation you received:    Relax and take it easy.    Do NOT make any important or legal decisions.    Do NOT drive or operate machines at home or at work.    Do NOT drink alcohol.    Diet:    You may resume your normal diet, but no scratchy foods for two days.    If your throat is sore, eat cold, bland or soft foods.    You may have heartburn if the tube used in the exam entered your stomach.  If so:   - Do not eat acidic and spicy foods.   - Do not eat three hours before bedtime. Clear liquids are okay.   - When lying down, use two pillows to raise your head.    Medicines:      Take your medications, including blood thinners, unless your provider tells you not to.    If you have stopped any medicines, check with your provider about when to restart them.    You may take Tylenol (Acetaminophen) if your throat is sore.    You may take antacids if you have heartburn.      Follow Up Appointments:      Follow up with your cardiologist at [facility] of patient preference as instructed.    Follow up with your primary care provider as needed.    Call the clinic if:      You have heartburn that is severe or lasts more than 72 hours.    You have a sore throat that feels worse after 72 hours.    You have shortness of breath, neck pain, chest pain, fever, chills, coughing up blood, or other unusual signs.    Questions or concerns      [facility] [place]:    [phone] [facility] (7 days a week)

## 2019-07-30 NOTE — PROGRESS NOTES
Care Suites Discharge Nursing Note    Education/questions answered: yes  Patient DC location: home  Accompanied by: daughter  CS discharge time: 1550    VSS, denies pain.

## 2019-07-30 NOTE — PROGRESS NOTES
Care Suites Procedure Nursing Note    Procedure: ALEJANDRA  Procedure started time: Sedation time 1413  Procedure completed time: 1450  Concerns/abnormal assessment: none  Plan: pt alert. Denies any pain.  Will cont to monitor.  Sedation for procedure: Versed 2 mg and Fentanyl 50 mcg.  Daughter here now at bedside.  BP soft, see flow sheet, will cont to monitor.

## 2019-07-31 ENCOUNTER — TELEPHONE (OUTPATIENT)
Dept: CARDIOLOGY | Facility: CLINIC | Age: 74
End: 2019-07-31

## 2019-07-31 NOTE — TELEPHONE ENCOUNTER
Next Dr. Ugarte OV 11-18-19.   ALEJANDRA done 7- 30-19  1. Moderate aortic valve stenosis with trace regurgitation.  2. The valve is trileaflet. Peak transaortic velocity 3.2 m/s, mean gradient  33 mm Hg, valve area 1.1 cmÂ .  3. Mild ascending aorta dilatation (3.9 cm). Normal aortic root dimension (2.9  cm).  4. No intracardiac mass or thrombus.  5. Normal left ventricular size and systolic function. LVEF 60-65%.  6. Bubble study negative for inter-atrial shunt.     No significant change in aortic valve severity compared to transthoracic echocardiogram dated 11/8/2018.  Findings discussed with patient and her daughter.  [place]: Dr. Ugarte.

## 2019-08-07 NOTE — TELEPHONE ENCOUNTER
I performed the ALEJANDRA.  Confirms moderate aortic stenosis which is known.  Results were discussed from the same day with patient and her daughter.  I will see her back as scheduled in my clinic, on November 18, 2019.    Dr. RAMAN Ugarte MD [facility]  Cardiology

## 2019-08-19 DIAGNOSIS — F41.9 ANXIETY AND DEPRESSION: ICD-10-CM

## 2019-08-19 DIAGNOSIS — F32.A ANXIETY AND DEPRESSION: ICD-10-CM

## 2019-08-19 NOTE — TELEPHONE ENCOUNTER
"Requested Prescriptions   Pending Prescriptions Disp Refills     hydrOXYzine (ATARAX) 25 MG tablet [Pharmacy Med Name: HYDROXYZINE HCL 25 MG TABLET] 90 tablet 0     Sig: TAKE 1 TABLET (25 MG) BY MOUTH EVERY 6 HOURS AS NEEDED FOR ANXIETY       Last Written Prescription Date:  7/22/2019  Last Fill Quantity: 90,  # refills: 0   Last office visit: 6/26/2019 with prescribing provider:     Future Office Visit:          Antihistamines Protocol Passed - 8/19/2019  2:50 PM        Passed - Recent (12 mo) or future (30 days) visit within the authorizing provider's specialty     Patient had office visit in the last 12 months or has a visit in the next 30 days with authorizing provider or within the authorizing provider's specialty.  See \"Patient Info\" tab in inbasket, or \"Choose Columns\" in Meds & Orders section of the refill encounter.              Passed - Patient is age 3 or older     Apply age and/or weight-based dosing for peds patients age 3 and older.    Forward request to provider for patients under the age of 3.          Passed - Medication is active on med list        "

## 2019-08-20 RX ORDER — HYDROXYZINE HYDROCHLORIDE 25 MG/1
25 TABLET, FILM COATED ORAL EVERY 6 HOURS PRN
Qty: 90 TABLET | Refills: 3 | Status: SHIPPED | OUTPATIENT
Start: 2019-08-20 | End: 2019-11-21

## 2019-08-20 NOTE — TELEPHONE ENCOUNTER
Routing refill request to provider for review/approval because:  Unclear from notes if medication should be refilled as it was just prescribed and is supposed to be PRN.    EMILIA RochaN, RN  [facility] [employer] [facility]

## 2019-09-05 DIAGNOSIS — G89.4 CHRONIC PAIN SYNDROME: ICD-10-CM

## 2019-09-05 RX ORDER — GABAPENTIN 300 MG/1
300 CAPSULE ORAL 3 TIMES DAILY
Qty: 90 CAPSULE | Refills: 0 | Status: SHIPPED | OUTPATIENT
Start: 2019-09-05 | End: 2019-09-27

## 2019-09-05 NOTE — TELEPHONE ENCOUNTER
Outpatient Medication Detail      Disp Refills Start End RIKKI   gabapentin (NEURONTIN) 300 MG capsule 90 capsule 1 6/26/2019  No   Sig - Route: Take 1 capsule (300 mg) by mouth 3 times daily - Oral     Problem List Complete:  Yes    Last Office Visit with [facility] primary care provider: 06/26/2019    Future Office visit:   Next 5 appointments (look out 90 days)    Nov 18, 2019  3:15 PM CST  Return Visit with Herman Ugarte MD  [facility] ([facility] [address] [place] [id number]  [phone] OPT 2          Controlled substance agreement:   Encounter-Level CSA - 12/12/2016:    Controlled Substance Agreement - Scan on 12/16/2016  5:16 PM: CONTROLLED SUBSTANCE AGREEMENT (below)       Patient-Level CSA:    There are no patient-level csa.         Last Urine Drug Screen: No results found for: CDAUT, No results found for: COMDAT, No results found for: THC13, PCP13, COC13, MAMP13, OPI13, AMP13, BZO13, TCA13, MTD13, BAR13, OXY13, PPX13, BUP13     Processing:  [facility] - [facility]IS[facility] Encarnacion    [email or web address]      Routing refill request to provider for review/approval because:  Drug not on the [facility] refill protocol         Sara Williamson RN  [place] [facility]

## 2019-09-12 ENCOUNTER — TELEPHONE (OUTPATIENT)
Dept: FAMILY MEDICINE | Facility: CLINIC | Age: 74
End: 2019-09-12

## 2019-09-12 DIAGNOSIS — F32.0 MILD MAJOR DEPRESSION (H): Primary | ICD-10-CM

## 2019-09-12 DIAGNOSIS — F41.9 ANXIETY: ICD-10-CM

## 2019-09-12 RX ORDER — CITALOPRAM HYDROBROMIDE 20 MG/1
20 TABLET ORAL DAILY
Qty: 30 TABLET | Refills: 0 | Status: SHIPPED | OUTPATIENT
Start: 2019-09-12 | End: 2019-10-07

## 2019-09-12 NOTE — TELEPHONE ENCOUNTER
Patient notified by phone of LP note.  Med check scheduled for 10/17/2019.  Patient advised to call sooner if any issues with the Citalopram.  Patient verbalized understanding and agreed with plan.      EMILIA Conrad, RN, N  [facility] [phone]

## 2019-09-12 NOTE — TELEPHONE ENCOUNTER
Patient says she is having some bad side effects from taking lexapro and spironolactone.     Was reading information about the combination of those drugs and is experiencing some of the symptoms:   Shakiness,Lightheadedness and Weakness in legs.    She has stopped the lexapro and now feels better.    Is wondering if there is a different anti depressant she can take that would reduce the side effects.     Patient can be called at [phone]      Essence Elizondo

## 2019-09-12 NOTE — TELEPHONE ENCOUNTER
OK to do a trial of citalopram.  Will send in 30 day supply.  Needs OV within a month for med check.      Nayeli Castorena MS, PA-C

## 2019-09-12 NOTE — TELEPHONE ENCOUNTER
Routing to LP to review and advise.      Ember Hammer, BS, RN, N  [facility]) [phone]

## 2019-09-18 DIAGNOSIS — F32.0 MILD MAJOR DEPRESSION (H): ICD-10-CM

## 2019-09-18 DIAGNOSIS — G89.4 CHRONIC PAIN SYNDROME: ICD-10-CM

## 2019-09-18 RX ORDER — BACLOFEN 10 MG/1
TABLET ORAL
Qty: 30 TABLET | Refills: 0 | Status: SHIPPED | OUTPATIENT
Start: 2019-09-18 | End: 2019-12-04

## 2019-09-18 RX ORDER — ESCITALOPRAM OXALATE 10 MG/1
TABLET ORAL
Qty: 30 TABLET | Refills: 0 | Status: SHIPPED | OUTPATIENT
Start: 2019-09-18 | End: 2019-12-04

## 2019-09-18 NOTE — TELEPHONE ENCOUNTER
Requested Prescriptions   Pending Prescriptions Disp Refills     baclofen (LIORESAL) 10 MG tablet [Pharmacy Med Name: BACLOFEN 10 MG TABLET]            Last Written Prescription Date:  6.26.19  Last Fill Quantity: 90 tablet,  # refills: 0   Last office visit: 6/26/2019 with prescribing provider:  PATRICIA Jalloh                 Future Office Visit:   Next 5 appointments (look out 90 days)    Oct 17, 2019  3:00 PM CDT  SHORT with Nayeli Castorena PA-C  [facility] [address] [place] [phone]  [phone]   Nov 18, 2019  3:15 PM CST  Return Visit with Herman Ugarte MD  [facility] ([facility]) [address] [place] [phone] OPT 2            90 tablet 0     Sig: TAKE 0.5 TABLET (5 MG) BY MOUTH 2 TIMES DAILY AS NEEDED FOR MUSCLE SPASMS       There is no refill protocol information for this order        escitalopram (LEXAPRO) 10 MG tablet [Pharmacy Med Name: ESCITALOPRAM 10 MG TABLET]                Last Written Prescription Date:  na  Last Fill Quantity: na,  # refills: na   Last office visit: 6/26/2019 with prescribing provider:  PATRICIA Jalloh               Future Office Visit:   Next 5 appointments (look out 90 days)    Oct 17, 2019  3:00 PM CDT  SHORT with Nayeli Castorena PA-C  [facility] [address] [place] [phone] [phone]   Nov 18, 2019  3:15 PM CST  Return Visit with Herman Ugarte MD  [facility] ([facility]) [address] [place] [phone] OPT 2            90 tablet 0     Sig: TAKE 1 TABLET BY MOUTH EVERY DAY       SSRIs Protocol Failed - 9/18/2019  6:52 AM        Failed - PHQ-9 score less than 5 in past 6 months     Please review last PHQ-9 score.    PHQ-9 SCORE 8/14/2017  "2/5/2018 6/26/2019   PHQ-9 Total Score - - -   PHQ-9 Total Score 2 2 18     NITHIN-7 SCORE 8/14/2017 2/5/2018 6/26/2019   Total Score - - -   Total Score 1 1 13                    Failed - Medication is active on med list        Passed - Patient is age 18 or older        Passed - No active pregnancy on record        Passed - No positive pregnancy test in last 12 months        Passed - Recent (6 mo) or future (30 days) visit within the authorizing provider's specialty     Patient had office visit in the last 6 months or has a visit in the next 30 days with authorizing provider or within the authorizing provider's specialty.  See \"Patient Info\" tab in inbasket, or \"Choose Columns\" in Meds & Orders section of the refill encounter.            "

## 2019-09-27 DIAGNOSIS — G89.4 CHRONIC PAIN SYNDROME: ICD-10-CM

## 2019-09-27 RX ORDER — GABAPENTIN 300 MG/1
CAPSULE ORAL
Qty: 90 CAPSULE | Refills: 2 | Status: SHIPPED | OUTPATIENT
Start: 2019-10-05 | End: 2019-12-04

## 2019-09-27 NOTE — TELEPHONE ENCOUNTER
Controlled Substance Refill Request for gabapentin (NEURONTIN) 300 MG capsule  Problem List Complete:  No     PROVIDER TO CONSIDER COMPLETION OF PROBLEM LIST AND OVERVIEW/CONTROLLED SUBSTANCE AGREEMENT    Last Written Prescription Date:  9-5-2019  Last Fill Quantity: 90 capsules,   # refills: 0    THE MOST RECENT OFFICE VISIT MUST BE WITHIN THE PAST 3 MONTHS. AT LEAST ONE FACE TO FACE VISIT MUST OCCUR EVERY 6 MONTHS. ADDITIONAL VISITS CAN BE VIRTUAL.  (THIS STATEMENT SHOULD BE DELETED.)    Last Office Visit with [facility] primary care provider: 6- Raffaele    Future Office visit:   Next 5 appointments (look out 90 days)    Oct 17, 2019  3:00 PM CDT  SHORT with Nayeli Castorena PA-C  [facility]) [address] [place] [phone]   Nov 18, 2019  3:15 PM CST  Return Visit with Herman Ugarte MD  [facility] ([facility]) [address] [place] [phone] OPT 2          Controlled substance agreement:   Encounter-Level CSA - 12/12/2016:    Controlled Substance Agreement - Scan on 12/16/2016  5:16 PM: CONTROLLED SUBSTANCE AGREEMENT     Patient-Level CSA:    There are no patient-level csa.         Last Urine Drug Screen: No results found for: CDAUT, No results found for: COMDAT, No results found for: THC13, PCP13, COC13, MAMP13, OPI13, AMP13, BZO13, TCA13, MTD13, BAR13, OXY13, PPX13, BUP13     Processing:  Mail to [facility] pharmacy     [email or web address]       checked in past 3 months?  No, route to RN

## 2019-09-27 NOTE — TELEPHONE ENCOUNTER
Due 10/05/2019  Rx postdated    Routing refill request to provider for review/approval because:  Drug not on the [facility] refill protocol         Sara Williamson RN  [facility]

## 2019-10-07 DIAGNOSIS — F41.9 ANXIETY: ICD-10-CM

## 2019-10-07 DIAGNOSIS — F32.0 MILD MAJOR DEPRESSION (H): ICD-10-CM

## 2019-10-07 NOTE — TELEPHONE ENCOUNTER
"Requested Prescriptions   Pending Prescriptions Disp Refills     citalopram (CELEXA) 20 MG tablet [Pharmacy Med Name: CITALOPRAM HBR 20 MG TABLET]            Last Written Prescription Date:  9.12.19  Last Fill Quantity: 30 tablet,  # refills: 0   Last office visit: 6/26/2019 with prescribing provider:  PATRICIA Jalloh             Future Office Visit:   Next 5 appointments (look out 90 days)    Oct 17, 2019  3:00 PM CDT  SHORT with Nayeli Castorena PA-C  [facility] [address] [place] [phone]  [phone]   Nov 18, 2019  3:15 PM CST  Return Visit with Herman Ugarte MD  [facility] ([facility]) [address] [place] [phone] OPT 2            30 tablet 0     Sig: TAKE 1 TABLET BY MOUTH EVERY DAY       SSRIs Protocol Failed - 10/7/2019  9:12 AM        Failed - PHQ-9 score less than 5 in past 6 months     Please review last PHQ-9 score.       PHQ-9 SCORE 8/14/2017 2/5/2018 6/26/2019   PHQ-9 Total Score - - -   PHQ-9 Total Score 2 2 18     NITHIN-7 SCORE 8/14/2017 2/5/2018 6/26/2019   Total Score - - -   Total Score 1 1 13                   Passed - Medication is active on med list        Passed - Patient is age 18 or older        Passed - No active pregnancy on record        Passed - No positive pregnancy test in last 12 months        Passed - Recent (6 mo) or future (30 days) visit within the authorizing provider's specialty     Patient had office visit in the last 6 months or has a visit in the next 30 days with authorizing provider or within the authorizing provider's specialty.  See \"Patient Info\" tab in inbasket, or \"Choose Columns\" in Meds & Orders section of the refill encounter.            "

## 2019-10-09 RX ORDER — CITALOPRAM HYDROBROMIDE 20 MG/1
TABLET ORAL
Qty: 30 TABLET | Refills: 0 | Status: SHIPPED | OUTPATIENT
Start: 2019-10-09 | End: 2019-11-01

## 2019-11-01 DIAGNOSIS — F32.0 MILD MAJOR DEPRESSION (H): ICD-10-CM

## 2019-11-01 DIAGNOSIS — F41.9 ANXIETY: ICD-10-CM

## 2019-11-01 RX ORDER — CITALOPRAM HYDROBROMIDE 20 MG/1
20 TABLET ORAL DAILY
Qty: 30 TABLET | Refills: 0 | Status: SHIPPED | OUTPATIENT
Start: 2019-11-01 | End: 2019-12-04

## 2019-11-01 NOTE — TELEPHONE ENCOUNTER
"Requested Prescriptions   Pending Prescriptions Disp Refills     citalopram (CELEXA) 20 MG tablet [Pharmacy Med Name: CITALOPRAM HBR 20 MG TABLET] 30 tablet 0     Sig: TAKE 1 TABLET BY MOUTH EVERY DAY       Last Refill:    Disp Refills Start End RIKKI   citalopram (CELEXA) 20 MG tablet 30 tablet 0 10/9/2019  No   Sig: TAKE 1 TABLET BY MOUTH EVERY DAY     SSRIs Protocol Failed - 11/1/2019 10:50 AM        Failed - PHQ-9 score less than 5 in past 6 months     Please review last PHQ-9 score.   PHQ-9 SCORE 8/14/2017 2/5/2018 6/26/2019   PHQ-9 Total Score - - -   PHQ-9 Total Score 2 2 18     NITHIN-7 SCORE 8/14/2017 2/5/2018 6/26/2019   Total Score - - -   Total Score 1 1 13           Passed - Medication is active on med list        Passed - Patient is age 18 or older        Passed - No active pregnancy on record        Passed - No positive pregnancy test in last 12 months        Passed - Recent (6 mo) or future (30 days) visit within the authorizing provider's specialty     Patient had office visit in the last 6 months or has a visit in the next 30 days with authorizing provider or within the authorizing provider's specialty.  See \"Patient Info\" tab in inbasket, or \"Choose Columns\" in Meds & Orders section of the refill encounter.      LOV: 06/26/2019          Routing refill request to provider for review/approval because:  PHQ9 above RN Refill protocol - 30 day supply was given last month due to high PHQ9 but no notes stating of what needs to be done for Follow-up.       Sara Williamson RN  [facility]  "

## 2019-11-21 DIAGNOSIS — F32.A ANXIETY AND DEPRESSION: ICD-10-CM

## 2019-11-21 DIAGNOSIS — F41.9 ANXIETY AND DEPRESSION: ICD-10-CM

## 2019-11-21 RX ORDER — HYDROXYZINE HYDROCHLORIDE 25 MG/1
25 TABLET, FILM COATED ORAL EVERY 6 HOURS PRN
Qty: 90 TABLET | Refills: 3 | Status: SHIPPED | OUTPATIENT
Start: 2019-11-21 | End: 2019-12-04

## 2019-11-21 NOTE — TELEPHONE ENCOUNTER
"    Last Written Prescription Date:  08/20/19  Last Fill Quantity: 90,  # refills: 3   Last office visit: 6/26/2019 with prescribing provider:     Future Office Visit:   Next 5 appointments (look out 90 days)    Dec 04, 2019  2:20 PM CST  Office Visit with Nayeli Castorena PA-C  [facility] ([facility]) [address] [place] [phone]         Requested Prescriptions   Pending Prescriptions Disp Refills     hydrOXYzine (ATARAX) 25 MG tablet [Pharmacy Med Name: HYDROXYZINE HCL 25 MG TABLET] 90 tablet 3     Sig: TAKE 1 TABLET (25 MG) BY MOUTH EVERY 6 HOURS AS NEEDED FOR ANXIETY       Antihistamines Protocol Passed - 11/21/2019  6:50 AM        Passed - Recent (12 mo) or future (30 days) visit within the authorizing provider's specialty     Patient has had an office visit with the authorizing provider or a provider within the authorizing providers department within the previous 12 mos or has a future within next 30 days. See \"Patient Info\" tab in inbasket, or \"Choose Columns\" in Meds & Orders section of the refill encounter.              Passed - Patient is age 3 or older     Apply age and/or weight-based dosing for peds patients age 3 and older.    Forward request to provider for patients under the age of 3.          Passed - Medication is active on med list          "

## 2019-11-25 ENCOUNTER — TRANSFERRED RECORDS (OUTPATIENT)
Dept: HEALTH INFORMATION MANAGEMENT | Facility: CLINIC | Age: 74
End: 2019-11-25

## 2019-11-25 LAB — PHQ9 SCORE: 4

## 2019-12-02 NOTE — PROGRESS NOTES
"  SUBJECTIVE:   Kavitha Headley is a 74 year old female who presents to clinic today for the following health issues:    Depression and Anxiety Follow-Up    How are you doing with your depression since your last visit? No change    How are you doing with your anxiety since your last visit?  No change    Are you having other symptoms that might be associated with depression or anxiety? No    Have you had a significant life event? OTHER: Family concerns     Do you have any concerns with your use of alcohol or other drugs? Yes:  Sober for 48 days    Social History     Tobacco Use     Smoking status: Never Smoker     Smokeless tobacco: Never Used   Substance Use Topics     Alcohol use: Yes     Alcohol/week: 63.0 standard drinks     Types: 63 Standard drinks or equivalent per week     Comment: three \"3oz\" liquor drinks nightly     Drug use: No     PHQ 8/14/2017 2/5/2018 6/26/2019   PHQ-9 Total Score 2 2 18   Q9: Thoughts of better off dead/self-harm past 2 weeks Not at all Not at all Not at all     NITHIN-7 SCORE 8/14/2017 2/5/2018 6/26/2019   Total Score - - -   Total Score 1 1 13     {Last PHQ9 or GAD7 Responses (Optional):851984}  {PROVIDER ONLY Complete follow-up questions for patients who report suicide ideation  (Optional):645505}    Suicide Assessment Five-step Evaluation and Treatment (SAFE-T)      How many servings of fruits and vegetables do you eat daily?  4 or more    On average, how many sweetened beverages do you drink each day (Examples: soda, juice, sweet tea, etc.  Do NOT count diet or artificially sweetened beverages)?   2    How many days per week do you miss taking your medication? 0    Aortic stenosis  No-showed appt with Dr. Ugarte (cardiology) on 11/18/2019.  ***    Problem list and histories reviewed & adjusted, as indicated.  Additional history: as documented    Patient Active Problem List   Diagnosis     Hyperlipidemia LDL goal <130     Spinal stenosis     Chronic insomnia     Alcohol abuse     CKD " (chronic kidney disease) stage 3, GFR 30-59 ml/min (H)     Hip joint replacement status     Knee joint replacement status     Chronic pain syndrome     Bariatric surgery status     Personal history of urinary calculi     Macrocytic anemia     Anxiety     Aortic stenosis     Left-sided low back pain without sciatica     PAC (premature atrial contraction)     Gastroesophageal reflux disease, esophagitis presence not specified     Controlled substance agreement signed     Seasonal affective disorder (H)     HTN, goal below 140/90     Bilateral lower leg cellulitis     Hypokalemia     Hyponatremia     Cellulitis     Depression, unspecified depression type     Vitamin B12 deficiency     Severe alcohol dependence (H)     Anxiety and depression     Liver disease, chronic, due to alcohol (H)     Paroxysmal supraventricular tachycardia (H)     Coronary artery disease involving native coronary artery of native heart without angina pectoris     Ascending aorta dilatation (H)     Sinus tachycardia     Past Surgical History:   Procedure Laterality Date     APPENDECTOMY  3/2004    incidental     C GASTRIC BYPASS,OBESE<100CM ARIANNA-EN-Y  1996     C MEDIASTINOSCOPY W OR WO BIOPSY  2/2008    Videomediastinoscopy and, for mediastinal adenopathy -reactive lymphoid hyperplasia     C REPAIR OF RECTOCELE  3/2012     C TOTAL KNEE ARTHROPLASTY  12/2005    left      CARPAL TUNNEL RELEASE RT/LT  10/2010    Carpometacarpal excisional arthroplasty with a fascial autograft and APL suspension sling (78860). 2. Left thumb metacarpophalangeal joint fusion with autologous bone graft (24229). 3. Left endoscopic carpal tunnel release      CHOLECYSTECTOMY, LAPOROSCOPIC  11/2010    Cholecystectomy, Laparoscopic     COLONOSCOPY N/A 9/8/2016    Procedure: COMBINED COLONOSCOPY, SINGLE OR MULTIPLE BIOPSY/POLYPECTOMY BY BIOPSY;  Surgeon: Moe Barlow MD;  Location:  GI     CYSTOCELE REPAIR  11/2012    davinci laparoscopic sacrocolpopexy, enterocele  "repair, lysis of adhesions, placement of retropubic mid urethral sling, cystoscopy     CYSTOSCOPY, LITHOTRIPSY, COMBINED  6/2006    Left extracorporeal shock wave lithotripsy, cystoscopy, left ureteral stent placement.     CYSTOSCOPY, REMOVE STENT(S), COMBINED  7/2006    Cystoscopy, removal of left ureteral stent, retrograde pyelography, flexible and rigid ureteroscopy and holmium laser lithotripsy, basket removal of stone fragments, ureteral stent placement.      ENDOSCOPIC ULTRASOUND UPPER GASTROINTESTINAL TRACT (GI) N/A 6/12/2017    Procedure: ENDOSCOPIC ULTRASOUND, ESOPHAGOSCOPY / UPPER GASTROINTESTINAL TRACT (GI);  ENDOSCOPIC ULTRASOUND, ESOPHAGOSCOPY / UPPER GASTROINTESTINAL TRACT (GI);  Surgeon: Parth Graham MD;  Location:  GI     HERNIA REPAIR  4/2012    bilateral augmentation mastopexy, ventral hernia repair, and medial thigh liposuction on 04/06/2012.      HYSTERECTOMY VAGINAL, BILATERAL SALPINGO-OOPHERECTOMY, COMBINED  1998    due to myoma and bleeding     JOINT REPLACEMENT, HIP RT/LT  4/2004    right total hip arthroplasty     LAPAROTOMY, LYSIS ADHESIONS, COMBINED  3/2004    lysis adhesions, ventral hernia repair, appendectomy incidentally     LYMPH NODE BIOPSY  4/2008    right axillary, reactive follicular and paracortical hyperplasia.     MAMMOPLASTY AUGMENTATION BILATERAL  4/2012     REVISE RECONSTRUCTED BREAST  6/7/2012    Left breast capsulotomy.        Social History     Tobacco Use     Smoking status: Never Smoker     Smokeless tobacco: Never Used   Substance Use Topics     Alcohol use: Yes     Alcohol/week: 63.0 standard drinks     Types: 63 Standard drinks or equivalent per week     Comment: three \"3oz\" liquor drinks nightly     Family History   Problem Relation Age of Onset     Substance Abuse Father      Cancer Father         throat and lung mets     Diabetes No family hx of      Coronary Artery Disease No family hx of      Cerebrovascular Disease No family hx of          Current " Outpatient Medications   Medication Sig Dispense Refill     ASPIRIN NOT PRESCRIBED (INTENTIONAL) Please choose reason not prescribed, below       atenolol (TENORMIN) 25 MG tablet Take 1 tablet (25 mg) by mouth every morning 100 tablet 4     baclofen (LIORESAL) 10 MG tablet TAKE 0.5 TABLET (5 MG) BY MOUTH 2 TIMES DAILY AS NEEDED FOR MUSCLE SPASMS 30 tablet 0     citalopram (CELEXA) 20 MG tablet Take 1 tablet (20 mg) by mouth daily (office visit required for further fills) 30 tablet 0     Cyanocobalamin (B-12) 1000 MCG TBCR Take 1,000 mcg by mouth daily 100 tablet 1     diphenhydrAMINE (BENADRYL) 25 MG capsule Take 25 mg by mouth nightly as needed       escitalopram (LEXAPRO) 10 MG tablet TAKE 1 TABLET BY MOUTH EVERY DAY 30 tablet 0     folic acid (FOLVITE) 1 MG tablet Take 1 tablet (1 mg) by mouth daily 30 tablet      furosemide (LASIX) 20 MG tablet TAKE 1 TABLET BY MOUTH EVERY DAY (Patient not taking: Reported on 7/24/2019) 90 tablet 0     furosemide (LASIX) 20 MG tablet Take 1 tablet (20 mg) by mouth as needed (leg edema) 30 tablet 4     gabapentin (NEURONTIN) 300 MG capsule TAKE 1 CAPSULE BY MOUTH THREE TIMES A DAY 90 capsule 2     HUMIRA PEN-PS/UV/ADOL HS START 40 MG/0.8ML pen kit   0     hydrOXYzine (ATARAX) 25 MG tablet TAKE 1 TABLET (25 MG) BY MOUTH EVERY 6 HOURS AS NEEDED FOR ANXIETY 90 tablet 3     ketoconazole (NIZORAL) 2 % shampoo Apply to the affected area and wash off after 5 minutes. (Patient not taking: Reported on 7/24/2019) 120 mL 1     magnesium oxide (MAG-OX) 400 MG tablet Take 1 tablet (400 mg) by mouth daily 7 tablet      melatonin 3 MG tablet Take 9 mg by mouth nightly as needed for sleep        Multiple Vitamins-Minerals (CENTRUM SILVER) per tablet Take 1 tablet by mouth daily       polyethylene glycol (MIRALAX/GLYCOLAX) Packet Take 17 g by mouth daily as needed (constipation) 7 packet      senna-docusate (SENOKOT-S;PERICOLACE) 8.6-50 MG per tablet Take 1-2 tablets by mouth 2 times daily as  needed for constipation 100 tablet      spironolactone (ALDACTONE) 100 MG tablet Take 1 tablet (100 mg) by mouth daily 90 tablet 0     STATIN NOT PRESCRIBED, INTENTIONAL, Please choose reason not prescribed, below       sucralfate (CARAFATE) 1 GM/10ML suspension Take 10 mLs (1 g) by mouth 4 times daily (before meals and nightly) Pt takes once daily (Patient not taking: Reported on 7/24/2019) 1200 mL 1     thiamine 100 MG tablet Take 1 tablet (100 mg) by mouth daily       traZODone (DESYREL) 100 MG tablet TAKE 1 TABLET (100 MG) BY MOUTH NIGHTLY AS NEEDED FOR SLEEP 90 tablet 1     valACYclovir (VALTREX) 1000 mg tablet TAKE 2 TABS BY MOUTH EVERY 12 HOURS FOR 1 DAY ONLY FOR OUTBREAKS OF COLD SORES AS NEEDED 4 tablet 2     Allergies   Allergen Reactions     Bactrim [Sulfamethoxazole W/Trimethoprim] Hives     Codeine Itching     NAUSEA     Morphine Itching     NAUSEA       Reviewed and updated as needed this visit by clinical staff       Reviewed and updated as needed this visit by Provider         ROS:  Constitutional, HEENT, cardiovascular, pulmonary, GI, , musculoskeletal, neuro, skin, endocrine and psych systems are negative, except as otherwise noted.    This document serves as a record of the services and decisions personally performed and made by Nayeli Castorena PA-C. It was created on her behalf by Heydi March, a trained medical scribe. The creation of this document is based on the provider's statements to the medical scribe.  Heydi March 2:32 PM December 2, 2019  OBJECTIVE:   There were no vitals taken for this visit. There is no height or weight on file to calculate BMI.    Exam:  GENERAL: healthy, alert and no distress  EYES: Eyes grossly normal to inspection, PERRL and conjunctivae and sclerae normal  HENT: ear canals and TM's normal and nose and mouth without ulcers or lesions  NECK: no adenopathy  RESP: lungs clear to auscultation - no rales, rhonchi or wheezes  CV: regular rate and rhythm, normal S1 S2, no  "S3 or S4, no murmur, click or rub, no peripheral edema and peripheral pulses strong  MS: no gross musculoskeletal defects noted, no edema  SKIN: no suspicious lesions or rashes  NEURO: Normal strength and tone, mentation intact and speech normal  PSYCH: mentation appears normal, affect normal/bright    {Diagnostic Test Results:636476::\"Diagnostic Test Results:\",\"none \"}  ASSESSMENT/PLAN:   Diagnoses and all orders for this visit:    Severe alcohol dependence (H)    Mild major depression (H)    Aortic dilatation (H)    Mild ascending aorta dilatation (H)    Aortic valve stenosis, etiology of cardiac valve disease unspecified    Vitamin B12 deficiency (non anemic)    Hyponatremia    Thiamine deficiency        There are no Patient Instructions on file for this visit.    No follow-ups on file.     [address] [address]  [email or web address]   Office: [phone]           Nayeli Castorena MS, PA-C      "

## 2019-12-03 DIAGNOSIS — B00.9 HERPES SIMPLEX VIRUS INFECTION: ICD-10-CM

## 2019-12-03 NOTE — TELEPHONE ENCOUNTER
"Requested Prescriptions   Pending Prescriptions Disp Refills     valACYclovir (VALTREX) 1000 mg tablet [Pharmacy Med Name: VALACYCLOVIR HCL 1 GRAM TABLET]          Last Written Prescription Date:  12.12.18  Last Fill Quantity: 4 tablet,  # refills: 2   Last office visit: 6/26/2019 with prescribing provider:  PATRICIA Jalloh             Future Office Visit:   Next 5 appointments (look out 90 days)    Dec 04, 2019  2:20 PM CST  [facility] PATRICIA [facility]YOSEF  [facility] [address] [place] [phone]            4 tablet 2     Sig: TAKE 2 TABS BY MOUTH EVERY 12 HOURS FOR 1 DAY ONLY FOR OUTBREAKS OF COLD SORES AS NEEDED       Antivirals for Herpes Protocol Passed - 12/3/2019  2:36 PM        Passed - Patient is age 12 or older        Passed - Recent (12 mo) or future (30 days) visit within the authorizing provider's specialty     Patient has had an office visit with the authorizing provider or a provider within the authorizing providers department within the previous 12 mos or has a future within next 30 days. See \"Patient Info\" tab in inbasket, or \"Choose Columns\" in Meds & Orders section of the refill encounter.              Passed - Medication is active on med list        Passed - Normal serum creatinine on file in past 12 months     Recent Labs   Lab Test 07/12/19  1117 07/12/19  1037   CR 1.24  --    CREAT  --  1.2*             "

## 2019-12-04 ENCOUNTER — OFFICE VISIT (OUTPATIENT)
Dept: FAMILY MEDICINE | Facility: CLINIC | Age: 74
End: 2019-12-04
Payer: COMMERCIAL

## 2019-12-04 VITALS
BODY MASS INDEX: 28 KG/M2 | HEART RATE: 64 BPM | DIASTOLIC BLOOD PRESSURE: 70 MMHG | WEIGHT: 164 LBS | OXYGEN SATURATION: 98 % | HEIGHT: 64 IN | SYSTOLIC BLOOD PRESSURE: 108 MMHG | TEMPERATURE: 97.4 F

## 2019-12-04 DIAGNOSIS — I10 BENIGN HYPERTENSION: ICD-10-CM

## 2019-12-04 DIAGNOSIS — E53.8 VITAMIN B12 DEFICIENCY (NON ANEMIC): ICD-10-CM

## 2019-12-04 DIAGNOSIS — G89.4 CHRONIC PAIN SYNDROME: ICD-10-CM

## 2019-12-04 DIAGNOSIS — E78.2 MIXED HYPERLIPIDEMIA: ICD-10-CM

## 2019-12-04 DIAGNOSIS — B00.9 HERPES SIMPLEX VIRUS INFECTION: ICD-10-CM

## 2019-12-04 DIAGNOSIS — E87.1 HYPONATREMIA: ICD-10-CM

## 2019-12-04 DIAGNOSIS — I77.819 AORTIC DILATATION (H): ICD-10-CM

## 2019-12-04 DIAGNOSIS — I35.0 AORTIC VALVE STENOSIS, ETIOLOGY OF CARDIAC VALVE DISEASE UNSPECIFIED: ICD-10-CM

## 2019-12-04 DIAGNOSIS — F32.A ANXIETY AND DEPRESSION: ICD-10-CM

## 2019-12-04 DIAGNOSIS — I77.810 MILD ASCENDING AORTA DILATATION (H): ICD-10-CM

## 2019-12-04 DIAGNOSIS — Z23 NEED FOR PROPHYLACTIC VACCINATION AND INOCULATION AGAINST INFLUENZA: ICD-10-CM

## 2019-12-04 DIAGNOSIS — F41.9 ANXIETY AND DEPRESSION: ICD-10-CM

## 2019-12-04 DIAGNOSIS — L40.9 PSORIASIS: ICD-10-CM

## 2019-12-04 DIAGNOSIS — R31.0 GROSS HEMATURIA: ICD-10-CM

## 2019-12-04 DIAGNOSIS — E51.9 THIAMINE DEFICIENCY: ICD-10-CM

## 2019-12-04 DIAGNOSIS — F32.0 MILD MAJOR DEPRESSION (H): ICD-10-CM

## 2019-12-04 DIAGNOSIS — F10.20 SEVERE ALCOHOL DEPENDENCE (H): Primary | ICD-10-CM

## 2019-12-04 DIAGNOSIS — G47.00 INSOMNIA, UNSPECIFIED TYPE: ICD-10-CM

## 2019-12-04 PROBLEM — E87.6 HYPOKALEMIA: Status: RESOLVED | Noted: 2017-06-04 | Resolved: 2019-12-04

## 2019-12-04 LAB
ALBUMIN UR-MCNC: NEGATIVE MG/DL
APPEARANCE UR: CLEAR
BILIRUB UR QL STRIP: NEGATIVE
COLOR UR AUTO: YELLOW
ERYTHROCYTE [DISTWIDTH] IN BLOOD BY AUTOMATED COUNT: 14.1 % (ref 10–15)
GLUCOSE UR STRIP-MCNC: NEGATIVE MG/DL
HCT VFR BLD AUTO: 37.6 % (ref 35–47)
HGB BLD-MCNC: 12.6 G/DL (ref 11.7–15.7)
HGB UR QL STRIP: NEGATIVE
KETONES UR STRIP-MCNC: NEGATIVE MG/DL
LEUKOCYTE ESTERASE UR QL STRIP: ABNORMAL
MCH RBC QN AUTO: 31.3 PG (ref 26.5–33)
MCHC RBC AUTO-ENTMCNC: 33.5 G/DL (ref 31.5–36.5)
MCV RBC AUTO: 93 FL (ref 78–100)
NITRATE UR QL: NEGATIVE
NON-SQ EPI CELLS #/AREA URNS LPF: ABNORMAL /LPF
PH UR STRIP: 5.5 PH (ref 5–7)
PLATELET # BLD AUTO: 317 10E9/L (ref 150–450)
RBC # BLD AUTO: 4.03 10E12/L (ref 3.8–5.2)
RBC #/AREA URNS AUTO: ABNORMAL /HPF
SOURCE: ABNORMAL
SP GR UR STRIP: 1.01 (ref 1–1.03)
UROBILINOGEN UR STRIP-ACNC: 0.2 EU/DL (ref 0.2–1)
WBC # BLD AUTO: 7 10E9/L (ref 4–11)
WBC #/AREA URNS AUTO: ABNORMAL /HPF

## 2019-12-04 PROCEDURE — 80076 HEPATIC FUNCTION PANEL: CPT | Performed by: PHYSICIAN ASSISTANT

## 2019-12-04 PROCEDURE — 84425 ASSAY OF VITAMIN B-1: CPT | Mod: 90 | Performed by: PHYSICIAN ASSISTANT

## 2019-12-04 PROCEDURE — 81001 URINALYSIS AUTO W/SCOPE: CPT | Performed by: PHYSICIAN ASSISTANT

## 2019-12-04 PROCEDURE — G0008 ADMIN INFLUENZA VIRUS VAC: HCPCS | Performed by: PHYSICIAN ASSISTANT

## 2019-12-04 PROCEDURE — 82607 VITAMIN B-12: CPT | Performed by: PHYSICIAN ASSISTANT

## 2019-12-04 PROCEDURE — 99000 SPECIMEN HANDLING OFFICE-LAB: CPT | Performed by: PHYSICIAN ASSISTANT

## 2019-12-04 PROCEDURE — 90662 IIV NO PRSV INCREASED AG IM: CPT | Performed by: PHYSICIAN ASSISTANT

## 2019-12-04 PROCEDURE — 96127 BRIEF EMOTIONAL/BEHAV ASSMT: CPT | Mod: 59 | Performed by: PHYSICIAN ASSISTANT

## 2019-12-04 PROCEDURE — 80061 LIPID PANEL: CPT | Performed by: PHYSICIAN ASSISTANT

## 2019-12-04 PROCEDURE — 85027 COMPLETE CBC AUTOMATED: CPT | Performed by: PHYSICIAN ASSISTANT

## 2019-12-04 PROCEDURE — 36415 COLL VENOUS BLD VENIPUNCTURE: CPT | Performed by: PHYSICIAN ASSISTANT

## 2019-12-04 PROCEDURE — 99214 OFFICE O/P EST MOD 30 MIN: CPT | Mod: 25 | Performed by: PHYSICIAN ASSISTANT

## 2019-12-04 PROCEDURE — 80048 BASIC METABOLIC PNL TOTAL CA: CPT | Performed by: PHYSICIAN ASSISTANT

## 2019-12-04 PROCEDURE — 82977 ASSAY OF GGT: CPT | Performed by: PHYSICIAN ASSISTANT

## 2019-12-04 RX ORDER — BUPROPION HYDROCHLORIDE 150 MG/1
TABLET ORAL
Refills: 0 | COMMUNITY
Start: 2019-11-25 | End: 2019-12-04

## 2019-12-04 RX ORDER — VALACYCLOVIR HYDROCHLORIDE 1 G/1
TABLET, FILM COATED ORAL
Qty: 4 TABLET | Refills: 2 | Status: SHIPPED | OUTPATIENT
Start: 2019-12-04 | End: 2020-01-28

## 2019-12-04 RX ORDER — CITALOPRAM HYDROBROMIDE 20 MG/1
20 TABLET ORAL DAILY
Qty: 90 TABLET | Refills: 1 | Status: SHIPPED | OUTPATIENT
Start: 2019-12-04 | End: 2020-01-28

## 2019-12-04 RX ORDER — GABAPENTIN 300 MG/1
CAPSULE ORAL
Qty: 90 CAPSULE | Refills: 1 | Status: SHIPPED | OUTPATIENT
Start: 2019-12-04 | End: 2020-01-28

## 2019-12-04 RX ORDER — BACLOFEN 10 MG/1
10 TABLET ORAL 2 TIMES DAILY
Qty: 180 TABLET | Refills: 1 | Status: SHIPPED | OUTPATIENT
Start: 2019-12-04 | End: 2020-01-28

## 2019-12-04 RX ORDER — FUROSEMIDE 20 MG
20 TABLET ORAL DAILY
Qty: 90 TABLET | Refills: 0 | Status: SHIPPED | OUTPATIENT
Start: 2019-12-04 | End: 2020-01-28

## 2019-12-04 RX ORDER — TRAZODONE HYDROCHLORIDE 100 MG/1
TABLET ORAL
Qty: 90 TABLET | Refills: 1 | Status: SHIPPED | OUTPATIENT
Start: 2019-12-04 | End: 2020-01-28

## 2019-12-04 RX ORDER — HYDROXYZINE HYDROCHLORIDE 25 MG/1
TABLET, FILM COATED ORAL
Qty: 135 TABLET | Refills: 1 | Status: SHIPPED | OUTPATIENT
Start: 2019-12-04 | End: 2020-01-28

## 2019-12-04 RX ORDER — ADALIMUMAB 40MG/0.8ML
40 KIT SUBCUTANEOUS
Refills: 0
Start: 2019-12-04 | End: 2020-09-25

## 2019-12-04 RX ORDER — BUPROPION HYDROCHLORIDE 150 MG/1
150 TABLET ORAL EVERY MORNING
Qty: 90 TABLET | Refills: 1 | Status: SHIPPED | OUTPATIENT
Start: 2019-12-04 | End: 2020-01-28

## 2019-12-04 RX ORDER — VALACYCLOVIR HYDROCHLORIDE 1 G/1
TABLET, FILM COATED ORAL
Qty: 4 TABLET | Refills: 2 | Status: SHIPPED | OUTPATIENT
Start: 2019-12-04 | End: 2019-12-04

## 2019-12-04 ASSESSMENT — ANXIETY QUESTIONNAIRES
GAD7 TOTAL SCORE: 1
7. FEELING AFRAID AS IF SOMETHING AWFUL MIGHT HAPPEN: NOT AT ALL
6. BECOMING EASILY ANNOYED OR IRRITABLE: NOT AT ALL
1. FEELING NERVOUS, ANXIOUS, OR ON EDGE: SEVERAL DAYS
2. NOT BEING ABLE TO STOP OR CONTROL WORRYING: NOT AT ALL
IF YOU CHECKED OFF ANY PROBLEMS ON THIS QUESTIONNAIRE, HOW DIFFICULT HAVE THESE PROBLEMS MADE IT FOR YOU TO DO YOUR WORK, TAKE CARE OF THINGS AT HOME, OR GET ALONG WITH OTHER PEOPLE: SOMEWHAT DIFFICULT
3. WORRYING TOO MUCH ABOUT DIFFERENT THINGS: NOT AT ALL
5. BEING SO RESTLESS THAT IT IS HARD TO SIT STILL: NOT AT ALL

## 2019-12-04 ASSESSMENT — PATIENT HEALTH QUESTIONNAIRE - PHQ9
SUM OF ALL RESPONSES TO PHQ QUESTIONS 1-9: 4
5. POOR APPETITE OR OVEREATING: NOT AT ALL

## 2019-12-04 ASSESSMENT — MIFFLIN-ST. JEOR: SCORE: 1219.15

## 2019-12-04 NOTE — LETTER
[facility]  [address] [place] [address]                              [phone]   December 9, 2019    Kavitha Headley  [address] [facility]  [place] [address]      Dear Linda,    Here is a summary of your recent test results:    -Normal red blood cell (hgb) levels, normal white blood cell count and normal platelet levels.  -LDL(bad) cholesterol level is elevated which can increase your heart disease risk.  A diet high in fat and simple carbohydrates, genetics and being overweight can contribute to this. ADVISE: exercising 150 minutes of aerobic exercise per week (30 minutes for 5 days per week or 50 minutes for 3 days per week are options) and eating a low saturated fat/low carbohydrate diet are helpful to improve this. In 12 months, you should recheck your fasting cholesterol panel by scheduling a lab-only appointment.  -Kidney function is decreased but stable (Cr, GFR), Sodium is normal, Potassium is normal, Calcium is normal, Glucose is normal.   -Liver and gallbladder tests (ALT,AST, Alk phos,bilirubin) have normalized, great news!  Keep up your sobriety!  -Urine is normal.  -B12 is elevated, please discontinue any supplements if you are taking them.  -B1 level is normal.  Your tests results are enclosed.    Please call us at [phone] (or use [employer]) to address the above recommendations.            Thank you very much for choosing [facility].     Best regards,      Nayeli Castorena PA-C    Results for orders placed or performed in visit on 12/04/19   GGT     Status: None   Result Value Ref Range    GGT 24 0 - 40 U/L   Hepatic panel (Albumin, ALT, AST, Bili, Alk Phos, TP)     Status: Abnormal   Result Value Ref Range    Bilirubin Direct 0.1 0.0 - 0.2 mg/dL    Bilirubin Total 0.3 0.2 - 1.3 mg/dL    Albumin 3.3 (L) 3.4 - 5.0 g/dL    Protein Total 7.5 6.8 - 8.8 g/dL    Alkaline Phosphatase 55 40 - 150 U/L    ALT 22 0 - 50 U/L    AST 25 0  - 45 U/L   Vitamin B12     Status: Abnormal   Result Value Ref Range    Vitamin B12 1,070 (H) 193 - 986 pg/mL   Basic metabolic panel  (Ca, Cl, CO2, Creat, Gluc, K, Na, BUN)     Status: Abnormal   Result Value Ref Range    Sodium 140 133 - 144 mmol/L    Potassium 4.0 3.4 - 5.3 mmol/L    Chloride 106 94 - 109 mmol/L    Carbon Dioxide 27 20 - 32 mmol/L    Anion Gap 7 3 - 14 mmol/L    Glucose 97 70 - 99 mg/dL    Urea Nitrogen 22 7 - 30 mg/dL    Creatinine 1.19 (H) 0.52 - 1.04 mg/dL    GFR Estimate 44 (L) >60 mL/min/[1.73_m2]    GFR Estimate If Black 51 (L) >60 mL/min/[1.73_m2]    Calcium 9.4 8.5 - 10.1 mg/dL   CBC with platelets     Status: None   Result Value Ref Range    WBC 7.0 4.0 - 11.0 10e9/L    RBC Count 4.03 3.8 - 5.2 10e12/L    Hemoglobin 12.6 11.7 - 15.7 g/dL    Hematocrit 37.6 35.0 - 47.0 %    MCV 93 78 - 100 fl    MCH 31.3 26.5 - 33.0 pg    MCHC 33.5 31.5 - 36.5 g/dL    RDW 14.1 10.0 - 15.0 %    Platelet Count 317 150 - 450 10e9/L   Vitamin B1 whole blood     Status: None   Result Value Ref Range    Vitamin B1 Whole Blood Level 90 70 - 180 nmol/L   Lipid panel reflex to direct LDL Fasting     Status: Abnormal   Result Value Ref Range    Cholesterol 303 (H) <200 mg/dL    Triglycerides 108 <150 mg/dL    HDL Cholesterol 84 >49 mg/dL    LDL Cholesterol Calculated 197 (H) <100 mg/dL    Non HDL Cholesterol 219 (H) <130 mg/dL   *UA reflex to Microscopic and Culture ([facility] (except [place] and [place])     Status: Abnormal   Result Value Ref Range    Color Urine Yellow     Appearance Urine Clear     Glucose Urine Negative NEG^Negative mg/dL    Bilirubin Urine Negative NEG^Negative    Ketones Urine Negative NEG^Negative mg/dL    Specific Gravity Urine 1.010 1.003 - 1.035    Blood Urine Negative NEG^Negative    pH Urine 5.5 5.0 - 7.0 pH    Protein Albumin Urine Negative NEG^Negative mg/dL    Urobilinogen Urine 0.2 0.2 - 1.0 EU/dL    Nitrite Urine Negative NEG^Negative    Leukocyte Esterase  Urine Trace (A) NEG^Negative    Source Midstream Urine    Urine Microscopic     Status: Abnormal   Result Value Ref Range    WBC Urine 0 - 5 OTO5^0 - 5 /HPF    RBC Urine O - 2 OTO2^O - 2 /HPF    Squamous Epithelial /LPF Urine Many (A) FEW^Few /LPF

## 2019-12-04 NOTE — PROGRESS NOTES
SUBJECTIVE:   Kavitha Headley is a 74 year old female who presents to clinic today for the following health issues:    Depression and Anxiety Follow-Up    How are you doing with your depression since your last visit? No change    How are you doing with your anxiety since your last visit?  No change    Are you having other symptoms that might be associated with depression or anxiety? No    Have you had a significant life event? OTHER: Family concerns     Do you have any concerns with your use of alcohol or other drugs? Yes:  Sober for 48 days  Patient reports her anxiety and depression are stable. She is taking citalopram 20 mg every day, Wellbutrin 150 mg daily (started 1 week ago by psychiatrist), and takes hydroxyzine 25 mg 2-3 tablets nightly. She also takes the hydroxyzine PRN during the day. She has recently became sober, her sober date is 10/18/19. She went sober over the span of 10 days, decreasing her ETOH intake gradually then quit on day 10. She attended AA meetings twice daily for the first week and now goes at least once daily, sometimes twice when needed. She reports she has a good support system, including her daughter and her significant other. Her significant other is a recovering alcoholic that has been 5 years sober now. Prior to her quit day she was drinking >1 pint a day of demetrice. She reports she did have some withdrawal post ETOH cessation. The holidays were tough for her, as this was the Thanksgiving she can remember being sober for. She is a little worried about her sobriety over the holidays but believes with continuing AA she can do it.     Insomnia  Patient takes trazodone 100 mg nightly in addition to her hydroxyzine 25 mg 2-3 tablets nightly. She reports she needs both of these to sleep well.     Hematuria  Patient reports mild gross hematuria for 1 day a few weeks ago. She denies any pain or history of kidney stones. She would to look into this.     Psoriasis  Patient is on Humira  "injection every 14 days. She reports this has helped her psoriasis immensely. This is managed by dermatology and she is due for a follow up soon. Her last follow up was May/Ariane of 2019.    Social History     Tobacco Use     Smoking status: Never Smoker     Smokeless tobacco: Never Used   Substance Use Topics     Alcohol use: Yes     Alcohol/week: 63.0 standard drinks     Types: 63 Standard drinks or equivalent per week     Comment: three \"3oz\" liquor drinks nightly     Drug use: No     PHQ 2/5/2018 6/26/2019 12/4/2019   PHQ-9 Total Score 2 18 4   Q9: Thoughts of better off dead/self-harm past 2 weeks Not at all Not at all Not at all     NITHIN-7 SCORE 2/5/2018 6/26/2019 12/4/2019   Total Score - - -   Total Score 1 13 1     Last PHQ-9 12/4/2019   1.  Little interest or pleasure in doing things 1   2.  Feeling down, depressed, or hopeless 1   3.  Trouble falling or staying asleep, or sleeping too much 1   4.  Feeling tired or having little energy 1   5.  Poor appetite or overeating 0   6.  Feeling bad about yourself 0   7.  Trouble concentrating 0   8.  Moving slowly or restless 0   Q9: Thoughts of better off dead/self-harm past 2 weeks 0   PHQ-9 Total Score 4   Difficulty at work, home, or with people Somewhat difficult     NITHIN-7  12/4/2019   1. Feeling nervous, anxious, or on edge 1   2. Not being able to stop or control worrying 0   3. Worrying too much about different things 0   4. Trouble relaxing 0   5. Being so restless that it is hard to sit still 0   6. Becoming easily annoyed or irritable 0   7. Feeling afraid, as if something awful might happen 0   NITHIN-7 Total Score 1   If you checked any problems, how difficult have they made it for you to do your work, take care of things at home, or get along with other people? Somewhat difficult       How many servings of fruits and vegetables do you eat daily?  4 or more    On average, how many sweetened beverages do you drink each day (Examples: soda, juice, sweet tea, " etc.  Do NOT count diet or artificially sweetened beverages)?   2    How many days per week do you miss taking your medication? 0    Aortic stenosis  No-showed appt with Dr. Ugarte (cardiology) on 11/18/2019. Patient notes Dr. Ugarte was present at her ALEJANDRA Echocardiogram and they reported her aortic stenosis was stable and did not need a follow up for one year. Patient denies any chest pain, SOB, dizziness, syncope or new symptoms.     Problem list and histories reviewed & adjusted, as indicated.  Additional history: as documented    Patient Active Problem List   Diagnosis     Spinal stenosis     Chronic insomnia     Alcohol abuse     CKD (chronic kidney disease) stage 3, GFR 30-59 ml/min (H)     Hip joint replacement status     Knee joint replacement status     Chronic pain syndrome     Bariatric surgery status     Mixed hyperlipidemia     Personal history of urinary calculi     Macrocytic anemia     Anxiety     Aortic stenosis     Left-sided low back pain without sciatica     PAC (premature atrial contraction)     Gastroesophageal reflux disease, esophagitis presence not specified     Controlled substance agreement signed     Seasonal affective disorder (H)     HTN, goal below 140/90     Bilateral lower leg cellulitis     Hyponatremia     Cellulitis     Depression, unspecified depression type     Vitamin B12 deficiency (non anemic)     Severe alcohol dependence (H)     Anxiety and depression     Liver disease, chronic, due to alcohol (H)     Paroxysmal supraventricular tachycardia (H)     Coronary artery disease involving native coronary artery of native heart without angina pectoris     Mild ascending aorta dilatation (H)     Psoriasis - on Humira - Dr. Gauri Clark with dermatology     Aortic dilatation (H)     Mild major depression (H)     Thiamine deficiency     Herpes simplex virus infection     Past Surgical History:   Procedure Laterality Date     APPENDECTOMY  3/2004    incidental     C GASTRIC  BYPASS,OBESE<100CM ARIANNA-EN-Y  1996     C MEDIASTINOSCOPY W OR WO BIOPSY  2/2008    Videomediastinoscopy and, for mediastinal adenopathy -reactive lymphoid hyperplasia     C REPAIR OF RECTOCELE  3/2012     C TOTAL KNEE ARTHROPLASTY  12/2005    left      CARPAL TUNNEL RELEASE RT/LT  10/2010    Carpometacarpal excisional arthroplasty with a fascial autograft and APL suspension sling (67216). 2. Left thumb metacarpophalangeal joint fusion with autologous bone graft (65732). 3. Left endoscopic carpal tunnel release      CHOLECYSTECTOMY, LAPOROSCOPIC  11/2010    Cholecystectomy, Laparoscopic     COLONOSCOPY N/A 9/8/2016    Procedure: COMBINED COLONOSCOPY, SINGLE OR MULTIPLE BIOPSY/POLYPECTOMY BY BIOPSY;  Surgeon: Moe Barlow MD;  Location:  GI     CYSTOCELE REPAIR  11/2012    davinci laparoscopic sacrocolpopexy, enterocele repair, lysis of adhesions, placement of retropubic mid urethral sling, cystoscopy     CYSTOSCOPY, LITHOTRIPSY, COMBINED  6/2006    Left extracorporeal shock wave lithotripsy, cystoscopy, left ureteral stent placement.     CYSTOSCOPY, REMOVE STENT(S), COMBINED  7/2006    Cystoscopy, removal of left ureteral stent, retrograde pyelography, flexible and rigid ureteroscopy and holmium laser lithotripsy, basket removal of stone fragments, ureteral stent placement.      ENDOSCOPIC ULTRASOUND UPPER GASTROINTESTINAL TRACT (GI) N/A 6/12/2017    Procedure: ENDOSCOPIC ULTRASOUND, ESOPHAGOSCOPY / UPPER GASTROINTESTINAL TRACT (GI);  ENDOSCOPIC ULTRASOUND, ESOPHAGOSCOPY / UPPER GASTROINTESTINAL TRACT (GI);  Surgeon: Parth Graham MD;  Location:  GI     HERNIA REPAIR  4/2012    bilateral augmentation mastopexy, ventral hernia repair, and medial thigh liposuction on 04/06/2012.      HYSTERECTOMY VAGINAL, BILATERAL SALPINGO-OOPHERECTOMY, COMBINED  1998    due to myoma and bleeding     JOINT REPLACEMENT, HIP RT/LT  4/2004    right total hip arthroplasty     LAPAROTOMY, LYSIS ADHESIONS, COMBINED  3/2004  "   lysis adhesions, ventral hernia repair, appendectomy incidentally     LYMPH NODE BIOPSY  4/2008    right axillary, reactive follicular and paracortical hyperplasia.     MAMMOPLASTY AUGMENTATION BILATERAL  4/2012     REVISE RECONSTRUCTED BREAST  6/7/2012    Left breast capsulotomy.        Social History     Tobacco Use     Smoking status: Never Smoker     Smokeless tobacco: Never Used   Substance Use Topics     Alcohol use: Yes     Alcohol/week: 63.0 standard drinks     Types: 63 Standard drinks or equivalent per week     Comment: three \"3oz\" liquor drinks nightly     Family History   Problem Relation Age of Onset     Substance Abuse Father      Cancer Father         throat and lung mets     Diabetes No family hx of      Coronary Artery Disease No family hx of      Cerebrovascular Disease No family hx of          Current Outpatient Medications   Medication Sig Dispense Refill     aspirin (ASA) 325 MG EC tablet Take 1 tablet (325 mg) by mouth daily       atenolol (TENORMIN) 25 MG tablet Take 1 tablet (25 mg) by mouth every morning 100 tablet 4     baclofen (LIORESAL) 10 MG tablet Take 1 tablet (10 mg) by mouth 2 times daily 180 tablet 1     buPROPion (WELLBUTRIN XL) 150 MG 24 hr tablet Take 1 tablet (150 mg) by mouth every morning 90 tablet 1     citalopram (CELEXA) 20 MG tablet Take 1 tablet (20 mg) by mouth daily 90 tablet 1     furosemide (LASIX) 20 MG tablet Take 1 tablet (20 mg) by mouth daily 90 tablet 0     gabapentin (NEURONTIN) 300 MG capsule TAKE 1 CAPSULE BY MOUTH THREE TIMES A DAY 90 capsule 1     HUMIRA PEN-PS/UV/ADOL HS START 40 MG/0.8ML pen kit Inject 0.8 mLs (40 mg) Subcutaneous every 14 days  0     hydrOXYzine (ATARAX) 25 MG tablet Take 3 tablets (75 mg) by mouth At Bedtime. May also take 1 tablet (25 mg) daily as needed for anxiety. 135 tablet 1     Multiple Vitamins-Minerals (CENTRUM SILVER) per tablet Take 1 tablet by mouth daily       polyethylene glycol (MIRALAX/GLYCOLAX) Packet Take 17 g by " "mouth daily as needed (constipation) 7 packet      senna-docusate (SENOKOT-S;PERICOLACE) 8.6-50 MG per tablet Take 1-2 tablets by mouth 2 times daily as needed for constipation 100 tablet      STATIN NOT PRESCRIBED, INTENTIONAL, Please choose reason not prescribed, below       traZODone (DESYREL) 100 MG tablet TAKE 1 TABLET (100 MG) BY MOUTH NIGHTLY AS NEEDED FOR SLEEP 90 tablet 1     valACYclovir (VALTREX) 1000 mg tablet TAKE 2 TABS BY MOUTH EVERY 12 HOURS FOR 1 DAY ONLY FOR OUTBREAKS OF COLD SORES AS NEEDED 4 tablet 2     Allergies   Allergen Reactions     Bactrim [Sulfamethoxazole W/Trimethoprim] Hives     Codeine Itching     NAUSEA     Morphine Itching     NAUSEA       Reviewed and updated as needed this visit by clinical staff  Tobacco  Allergies  Meds  Problems  Med Hx  Surg Hx  Fam Hx  Soc Hx        Reviewed and updated as needed this visit by Provider  Tobacco  Allergies  Meds  Problems  Med Hx  Surg Hx  Fam Hx         ROS:  Constitutional, HEENT, cardiovascular, pulmonary, GI, , musculoskeletal, neuro, skin, endocrine and psych systems are negative, except as otherwise noted.    OBJECTIVE:   /70 (BP Location: Right arm, Cuff Size: Adult Regular)   Pulse 64   Temp 97.4  F (36.3  C) (Oral)   Ht 1.626 m (5' 4.02\")   Wt 74.4 kg (164 lb)   SpO2 98%   BMI 28.14 kg/m   Body mass index is 28.14 kg/m .    Exam:  GENERAL: healthy, alert and no distress  EYES: Eyes grossly normal to inspection, PERRL and conjunctivae and sclerae normal  HENT: ear canals and TM's normal and nose and mouth without ulcers or lesions  NECK: no adenopathy  RESP: lungs clear to auscultation - no rales, rhonchi or wheezes  CV: Grade III/VI blowing systolic ejection murmur, regular rate and rhythm, no S3 or S4, no click or rub, no peripheral edema and peripheral pulses strong  MS: no gross musculoskeletal defects noted, no edema  SKIN: no suspicious lesions or rashes  NEURO: Normal strength and tone, mentation " intact and speech normal  PSYCH: mentation appears normal, affect normal/bright    Results for orders placed or performed in visit on 12/04/19   CBC with platelets     Status: None   Result Value Ref Range    WBC 7.0 4.0 - 11.0 10e9/L    RBC Count 4.03 3.8 - 5.2 10e12/L    Hemoglobin 12.6 11.7 - 15.7 g/dL    Hematocrit 37.6 35.0 - 47.0 %    MCV 93 78 - 100 fl    MCH 31.3 26.5 - 33.0 pg    MCHC 33.5 31.5 - 36.5 g/dL    RDW 14.1 10.0 - 15.0 %    Platelet Count 317 150 - 450 10e9/L   *UA reflex to Microscopic and Culture ([facility] (except [place] and [place])     Status: Abnormal   Result Value Ref Range    Color Urine Yellow     Appearance Urine Clear     Glucose Urine Negative NEG^Negative mg/dL    Bilirubin Urine Negative NEG^Negative    Ketones Urine Negative NEG^Negative mg/dL    Specific Gravity Urine 1.010 1.003 - 1.035    Blood Urine Negative NEG^Negative    pH Urine 5.5 5.0 - 7.0 pH    Protein Albumin Urine Negative NEG^Negative mg/dL    Urobilinogen Urine 0.2 0.2 - 1.0 EU/dL    Nitrite Urine Negative NEG^Negative    Leukocyte Esterase Urine Trace (A) NEG^Negative    Source Midstream Urine    Urine Microscopic     Status: Abnormal   Result Value Ref Range    WBC Urine 0 - 5 OTO5^0 - 5 /HPF    RBC Urine O - 2 OTO2^O - 2 /HPF    Squamous Epithelial /LPF Urine Many (A) FEW^Few /LPF       ASSESSMENT/PLAN:   Kavitha was seen today for recheck medication and imm/inj.    Diagnoses and all orders for this visit:    Severe alcohol dependence (H)  Patient recently stopped drinking ETOH, sober date 10/18/19. Applauded sobriety.  Advised patient to continue with her ETOH cessation and continue going to AA at least once daily. Will recheck labs today, but discussed that her labs won't likely show a significant difference yet. Patient was drinking > 1 pint of demetrice a day before ETOH cessation.   -     GGT  -     Hepatic panel (Albumin, ALT, AST, Bili, Alk Phos, TP)  -     Vitamin B12  -     Basic  metabolic panel  (Ca, Cl, CO2, Creat, Gluc, K, Na, BUN)  -     CBC with platelets  -     Vitamin B1 whole blood    Mild major depression (H), Anxiety and depression  Stable. Patient to continue Celexa 20 mg every day, Wellbutrin 150 mg daily, hydroxyzine 25 mg PRN during the day and hydroxyzine 25 mg nightly. Advised patient to contact clinic if anxiety or depression worsens.   -     buPROPion (WELLBUTRIN XL) 150 MG 24 hr tablet; Take 1 tablet (150 mg) by mouth every morning  -     citalopram (CELEXA) 20 MG tablet; Take 1 tablet (20 mg) by mouth daily  -     hydrOXYzine (ATARAX) 25 MG tablet; Take 3 tablets (75 mg) by mouth At Bedtime. May also take 1 tablet (25 mg) daily as needed for anxiety.    Aortic valve stenosis, etiology of cardiac valve disease unspecified, Aortic dilatation (H), Mild ascending aorta dilatation (H), Mixed hyperlipidemia  Patient following with Dr. Ugarte for aortic valve stenosis. Patient had recent ALEJANDRA echocardiogram that showed it was stable. Patient notes Dr. Ugarte wanted to see her back in 1 year for follow up. Will check lipid panel today.   -     Lipid panel reflex to direct LDL Fasting    Vitamin B12 deficiency (non anemic)  Patient is not consistent with taking her vitamins. Will recheck labs today.  -     Vitamin B12    Hyponatremia  -     Basic metabolic panel  (Ca, Cl, CO2, Creat, Gluc, K, Na, BUN)    Thiamine deficiency  Patient has history of severe alcohol abuse. She is not consistent with taking her vitamins. Will recheck labs today.   -     Vitamin B1 whole blood    Psoriasis - on Humira - Dr. Gauri Clark with dermatology  Psoriasis much improved on Humira. Patient injects q 14 days and follows with dermatology for this. Patient to make follow up soon.   -     HUMIRA PEN-PS/UV/ADOL HS START 40 MG/0.8ML pen kit; Inject 0.8 mLs (40 mg) Subcutaneous every 14 days    Chronic pain syndrome  Patient to continue baclofen 10 mg BID and gabapentin 300 mg TID for pain.   -     baclofen  (LIORESAL) 10 MG tablet; Take 1 tablet (10 mg) by mouth 2 times daily  -     gabapentin (NEURONTIN) 300 MG capsule; TAKE 1 CAPSULE BY MOUTH THREE TIMES A DAY    Herpes simplex virus infection  Patient currently gets 1-2 break outs a year. Patient to continue taking valacyclovir PRN.   -     valACYclovir (VALTREX) 1000 mg tablet; TAKE 2 TABS BY MOUTH EVERY 12 HOURS FOR 1 DAY ONLY FOR OUTBREAKS OF COLD SORES AS NEEDED    Need for prophylactic vaccination and inoculation against influenza  -     INFLUENZA (HIGH DOSE) 3 VALENT VACCINE [76601]  -     Vaccine Administration, Initial [37101]    Insomnia, unspecified type  Well controlled with trazodone 100 mg nightly.  -     traZODone (DESYREL) 100 MG tablet; TAKE 1 TABLET (100 MG) BY MOUTH NIGHTLY AS NEEDED FOR SLEEP    Benign hypertension  Patient currently takes only PRN when she retains extra water.  -     furosemide (LASIX) 20 MG tablet; Take 1 tablet (20 mg) by mouth daily    Hematuria  Given patient's recent hematuria x 1 day, will order UA and culture. Will contact patient with abnormal results.   -     *UA reflex to Microscopic and Culture ([facility] (except [place] and [place])      Return in about 6 months (around 6/4/2020) for Physical Exam, Lab Work.     [address] [address]  [email or web address]   Office: [phone]           Nayeli Castorena MS, PA-C

## 2019-12-05 LAB
ALBUMIN SERPL-MCNC: 3.3 G/DL (ref 3.4–5)
ALP SERPL-CCNC: 55 U/L (ref 40–150)
ALT SERPL W P-5'-P-CCNC: 22 U/L (ref 0–50)
ANION GAP SERPL CALCULATED.3IONS-SCNC: 7 MMOL/L (ref 3–14)
AST SERPL W P-5'-P-CCNC: 25 U/L (ref 0–45)
BILIRUB DIRECT SERPL-MCNC: 0.1 MG/DL (ref 0–0.2)
BILIRUB SERPL-MCNC: 0.3 MG/DL (ref 0.2–1.3)
BUN SERPL-MCNC: 22 MG/DL (ref 7–30)
CALCIUM SERPL-MCNC: 9.4 MG/DL (ref 8.5–10.1)
CHLORIDE SERPL-SCNC: 106 MMOL/L (ref 94–109)
CHOLEST SERPL-MCNC: 303 MG/DL
CO2 SERPL-SCNC: 27 MMOL/L (ref 20–32)
CREAT SERPL-MCNC: 1.19 MG/DL (ref 0.52–1.04)
GFR SERPL CREATININE-BSD FRML MDRD: 44 ML/MIN/{1.73_M2}
GGT SERPL-CCNC: 24 U/L (ref 0–40)
GLUCOSE SERPL-MCNC: 97 MG/DL (ref 70–99)
HDLC SERPL-MCNC: 84 MG/DL
LDLC SERPL CALC-MCNC: 197 MG/DL
NONHDLC SERPL-MCNC: 219 MG/DL
POTASSIUM SERPL-SCNC: 4 MMOL/L (ref 3.4–5.3)
PROT SERPL-MCNC: 7.5 G/DL (ref 6.8–8.8)
SODIUM SERPL-SCNC: 140 MMOL/L (ref 133–144)
TRIGL SERPL-MCNC: 108 MG/DL
VIT B12 SERPL-MCNC: 1070 PG/ML (ref 193–986)

## 2019-12-05 RX ORDER — GABAPENTIN 300 MG/1
CAPSULE ORAL
Qty: 270 CAPSULE | Refills: 0 | OUTPATIENT
Start: 2019-12-05

## 2019-12-05 ASSESSMENT — ANXIETY QUESTIONNAIRES: GAD7 TOTAL SCORE: 1

## 2019-12-05 NOTE — TELEPHONE ENCOUNTER
gabapentin (NEURONTIN) 300 MG capsule      Last Written Prescription Date:  12.4.19  Last Fill Quantity: 90 capsule,  # refills: 1   Last office visit: 12/4/2019 with prescribing provider:  PATRICIA Jalloh           Future Office Visit:        Routing refill request to provider for review/approval because:  Drug not on the [facility], [facility] or [facility] refill protocol or controlled substance

## 2019-12-05 NOTE — TELEPHONE ENCOUNTER
This was already sent to [facility] on 12/4/2019.  Pharmacy advised to check profile for refill.      EMILIA Conrad, RN, PHN  [facility]  Office: [phone]  Fax: [phone]

## 2019-12-08 LAB — VIT B1 BLD-MCNC: 90 NMOL/L (ref 70–180)

## 2019-12-09 NOTE — RESULT ENCOUNTER NOTE
Linda  I have reviewed your recent labs. Here are the results:    -Normal red blood cell (hgb) levels, normal white blood cell count and normal platelet levels.  -LDL(bad) cholesterol level is elevated which can increase your heart disease risk.  A diet high in fat and simple carbohydrates, genetics and being overweight can contribute to this. ADVISE: exercising 150 minutes of aerobic exercise per week (30 minutes for 5 days per week or 50 minutes for 3 days per week are options) and eating a low saturated fat/low carbohydrate diet are helpful to improve this. In 12 months, you should recheck your fasting cholesterol panel by scheduling a lab-only appointment.  -Kidney function is decreased but stable (Cr, GFR), Sodium is normal, Potassium is normal, Calcium is normal, Glucose is normal.   -Liver and gallbladder tests (ALT,AST, Alk phos,bilirubin) have normalized, great news!  Keep up your sobriety!  -Urine is normal.  -B12 is elevated, please discontinue any supplements if you are taking them.  -B1 level is normal.  For additional lab test information, [email or web address] is an excellent reference.     If you have any questions please do not hesitate to contact our office via phone ([phone]) or MyChart.    Nayeli Castorena, MS, PA-C  [facility]

## 2019-12-13 ENCOUNTER — TRANSFERRED RECORDS (OUTPATIENT)
Dept: HEALTH INFORMATION MANAGEMENT | Facility: CLINIC | Age: 74
End: 2019-12-13

## 2019-12-13 LAB — PHQ9 SCORE: 3

## 2020-01-09 ENCOUNTER — TRANSFERRED RECORDS (OUTPATIENT)
Dept: HEALTH INFORMATION MANAGEMENT | Facility: CLINIC | Age: 75
End: 2020-01-09

## 2020-01-28 ENCOUNTER — OFFICE VISIT (OUTPATIENT)
Dept: FAMILY MEDICINE | Facility: CLINIC | Age: 75
End: 2020-01-28
Payer: COMMERCIAL

## 2020-01-28 ENCOUNTER — TELEPHONE (OUTPATIENT)
Dept: FAMILY MEDICINE | Facility: CLINIC | Age: 75
End: 2020-01-28

## 2020-01-28 VITALS
TEMPERATURE: 98.1 F | HEART RATE: 75 BPM | WEIGHT: 155 LBS | HEIGHT: 64 IN | BODY MASS INDEX: 26.46 KG/M2 | SYSTOLIC BLOOD PRESSURE: 114 MMHG | DIASTOLIC BLOOD PRESSURE: 68 MMHG | OXYGEN SATURATION: 98 %

## 2020-01-28 DIAGNOSIS — K70.9 LIVER DISEASE, CHRONIC, DUE TO ALCOHOL (H): ICD-10-CM

## 2020-01-28 DIAGNOSIS — N18.30 CKD (CHRONIC KIDNEY DISEASE) STAGE 3, GFR 30-59 ML/MIN (H): ICD-10-CM

## 2020-01-28 DIAGNOSIS — F32.A ANXIETY AND DEPRESSION: ICD-10-CM

## 2020-01-28 DIAGNOSIS — F10.21 ALCOHOL DEPENDENCE IN REMISSION (H): ICD-10-CM

## 2020-01-28 DIAGNOSIS — Z79.899 IMMUNOCOMPROMISED STATE DUE TO DRUG THERAPY (H): ICD-10-CM

## 2020-01-28 DIAGNOSIS — B37.83 ANGULAR CHEILITIS WITH CANDIDIASIS: ICD-10-CM

## 2020-01-28 DIAGNOSIS — I10 HTN, GOAL BELOW 140/90: ICD-10-CM

## 2020-01-28 DIAGNOSIS — I77.819 AORTIC DILATATION (H): ICD-10-CM

## 2020-01-28 DIAGNOSIS — L40.9 PSORIASIS: ICD-10-CM

## 2020-01-28 DIAGNOSIS — Z01.818 PREOP GENERAL PHYSICAL EXAM: Primary | ICD-10-CM

## 2020-01-28 DIAGNOSIS — B00.9 HERPES SIMPLEX VIRUS INFECTION: ICD-10-CM

## 2020-01-28 DIAGNOSIS — D84.821 IMMUNOCOMPROMISED STATE DUE TO DRUG THERAPY (H): ICD-10-CM

## 2020-01-28 DIAGNOSIS — M20.41 HAMMERTOE OF RIGHT FOOT: ICD-10-CM

## 2020-01-28 DIAGNOSIS — E78.2 MIXED HYPERLIPIDEMIA: ICD-10-CM

## 2020-01-28 DIAGNOSIS — G89.4 CHRONIC PAIN SYNDROME: ICD-10-CM

## 2020-01-28 DIAGNOSIS — I47.10 PAROXYSMAL SUPRAVENTRICULAR TACHYCARDIA (H): ICD-10-CM

## 2020-01-28 DIAGNOSIS — G47.00 INSOMNIA, UNSPECIFIED TYPE: ICD-10-CM

## 2020-01-28 DIAGNOSIS — F32.0 MILD MAJOR DEPRESSION (H): ICD-10-CM

## 2020-01-28 DIAGNOSIS — M21.611 BUNION, RIGHT: ICD-10-CM

## 2020-01-28 DIAGNOSIS — I10 BENIGN HYPERTENSION: ICD-10-CM

## 2020-01-28 DIAGNOSIS — F41.9 ANXIETY AND DEPRESSION: ICD-10-CM

## 2020-01-28 PROBLEM — L03.115 BILATERAL LOWER LEG CELLULITIS: Status: RESOLVED | Noted: 2017-06-04 | Resolved: 2020-01-28

## 2020-01-28 PROBLEM — L03.116 BILATERAL LOWER LEG CELLULITIS: Status: RESOLVED | Noted: 2017-06-04 | Resolved: 2020-01-28

## 2020-01-28 LAB
ERYTHROCYTE [DISTWIDTH] IN BLOOD BY AUTOMATED COUNT: 14.6 % (ref 10–15)
HCT VFR BLD AUTO: 35.3 % (ref 35–47)
HGB BLD-MCNC: 11.7 G/DL (ref 11.7–15.7)
MCH RBC QN AUTO: 31.2 PG (ref 26.5–33)
MCHC RBC AUTO-ENTMCNC: 33.1 G/DL (ref 31.5–36.5)
MCV RBC AUTO: 94 FL (ref 78–100)
PLATELET # BLD AUTO: 306 10E9/L (ref 150–450)
RBC # BLD AUTO: 3.75 10E12/L (ref 3.8–5.2)
WBC # BLD AUTO: 7.4 10E9/L (ref 4–11)

## 2020-01-28 PROCEDURE — 99215 OFFICE O/P EST HI 40 MIN: CPT | Performed by: PHYSICIAN ASSISTANT

## 2020-01-28 PROCEDURE — 80053 COMPREHEN METABOLIC PANEL: CPT | Performed by: PHYSICIAN ASSISTANT

## 2020-01-28 PROCEDURE — 93000 ELECTROCARDIOGRAM COMPLETE: CPT | Performed by: PHYSICIAN ASSISTANT

## 2020-01-28 PROCEDURE — 36415 COLL VENOUS BLD VENIPUNCTURE: CPT | Performed by: PHYSICIAN ASSISTANT

## 2020-01-28 PROCEDURE — 85027 COMPLETE CBC AUTOMATED: CPT | Performed by: PHYSICIAN ASSISTANT

## 2020-01-28 RX ORDER — BUPROPION HYDROCHLORIDE 150 MG/1
150 TABLET ORAL EVERY MORNING
Qty: 90 TABLET | Refills: 1 | Status: SHIPPED | OUTPATIENT
Start: 2020-01-28 | End: 2020-10-21

## 2020-01-28 RX ORDER — TRAZODONE HYDROCHLORIDE 100 MG/1
TABLET ORAL
Qty: 90 TABLET | Refills: 1 | Status: ON HOLD | OUTPATIENT
Start: 2020-01-28 | End: 2020-02-12

## 2020-01-28 RX ORDER — NYSTATIN 100000 U/G
OINTMENT TOPICAL 3 TIMES DAILY
Qty: 15 G | Refills: 0 | Status: ON HOLD | OUTPATIENT
Start: 2020-01-28 | End: 2020-02-12

## 2020-01-28 RX ORDER — DIAPER,BRIEF,INFANT-TODD,DISP
EACH MISCELLANEOUS 3 TIMES DAILY
Status: ON HOLD | COMMUNITY
Start: 2020-01-28 | End: 2020-02-12

## 2020-01-28 RX ORDER — GABAPENTIN 300 MG/1
CAPSULE ORAL
Qty: 90 CAPSULE | Refills: 1 | Status: ON HOLD | OUTPATIENT
Start: 2020-01-28 | End: 2020-02-12

## 2020-01-28 RX ORDER — ADALIMUMAB 40MG/0.8ML
40 KIT SUBCUTANEOUS
Refills: 0 | Status: CANCELLED | OUTPATIENT
Start: 2020-01-28

## 2020-01-28 RX ORDER — BACLOFEN 10 MG/1
10 TABLET ORAL 2 TIMES DAILY
Qty: 180 TABLET | Refills: 1 | Status: SHIPPED | OUTPATIENT
Start: 2020-01-28 | End: 2020-03-17 | Stop reason: DRUGHIGH

## 2020-01-28 RX ORDER — ATENOLOL 25 MG/1
25 TABLET ORAL EVERY MORNING
Qty: 90 TABLET | Refills: 1 | Status: SHIPPED | OUTPATIENT
Start: 2020-01-28 | End: 2020-10-08

## 2020-01-28 RX ORDER — VALACYCLOVIR HYDROCHLORIDE 1 G/1
TABLET, FILM COATED ORAL
Qty: 4 TABLET | Refills: 2 | Status: ON HOLD | OUTPATIENT
Start: 2020-01-28 | End: 2020-01-31

## 2020-01-28 RX ORDER — MUPIROCIN 20 MG/G
OINTMENT TOPICAL 3 TIMES DAILY
Qty: 22 G | Refills: 0 | Status: ON HOLD | OUTPATIENT
Start: 2020-01-28 | End: 2020-02-12

## 2020-01-28 RX ORDER — FUROSEMIDE 20 MG
20 TABLET ORAL DAILY
Qty: 90 TABLET | Refills: 0 | Status: SHIPPED | OUTPATIENT
Start: 2020-01-28 | End: 2020-02-26

## 2020-01-28 RX ORDER — CITALOPRAM HYDROBROMIDE 20 MG/1
20 TABLET ORAL DAILY
Qty: 90 TABLET | Refills: 1 | Status: SHIPPED | OUTPATIENT
Start: 2020-01-28 | End: 2020-10-21

## 2020-01-28 RX ORDER — HYDROXYZINE HYDROCHLORIDE 25 MG/1
TABLET, FILM COATED ORAL
Qty: 135 TABLET | Refills: 1 | Status: ON HOLD | OUTPATIENT
Start: 2020-01-28 | End: 2020-01-31

## 2020-01-28 ASSESSMENT — MIFFLIN-ST. JEOR: SCORE: 1178.33

## 2020-01-28 NOTE — PROGRESS NOTES
[address] [address]  [phone]  Dept: [phone]    PRE-OP EVALUATION:  Today's date: 2020    Kavitha Headley (: 1945) presents for pre-operative evaluation assessment as requested by Dr. Reyes.  She requires evaluation and anesthesia risk assessment prior to undergoing surgery/procedure for treatment of Bunion and hammertoe of her right foot.    Proposed Surgery/ Procedure: RIGHT HALLUX VALGUS WITH 2ND AND 3RD CLAWTOE RECONSTRUCTION  Date of Surgery/ Procedure: 2020  Time of Surgery/ Procedure: 8:35am  Hospital/Surgical Facility: [facility]   Fax number for surgical facility:   Primary Physician: Nayeli Castorena  Type of Anesthesia Anticipated: Choice    Patient has a Health Care Directive or Living Will:  NO    1. NO - Do you have a history of heart attack, stroke, stent, bypass or surgery on an artery in the head, neck, heart or legs?  2. NO - Do you ever have any pain or discomfort in your chest?  3. NO - Do you have a history of  Heart Failure?  4. NO - Are you troubled by shortness of breath when: walking on the level, up a slight hill or at night?  5. NO - Do you currently have a cold, bronchitis or other respiratory infection?  6. NO - Do you have a cough, shortness of breath or wheezing?  7. NO - Do you sometimes get pains in the calves of your legs when you walk?  8. NO - Do you or anyone in your family have previous history of blood clots?  9. NO - Do you or does anyone in your family have a serious bleeding problem such as prolonged bleeding following surgeries or cuts?  10. NO - Have you ever had problems with anemia or been told to take iron pills?  11. NO - Have you had any abnormal blood loss such as black, tarry or bloody stools, or abnormal vaginal bleeding?  12. NO - Have you ever had a blood transfusion?  13. NO - Have you or any of your relatives ever had problems with  anesthesia?  14. NO - Do you have sleep apnea, excessive snoring or daytime drowsiness?  15. NO - Do you have any prosthetic heart valves?  16. YES - DO YOU HAVE PROSTHETIC JOINTS? Bilateral Knees and Right Hip   17. NO - Is there any chance that you may be pregnant?      HPI:     HPI related to upcoming procedure: Patient reports of undergoing surgery for bunion and hammertoe of her right foot.  Reports it is painful and uncomfortable.    CAD - Patient has a longstanding history of moderate-severe CAD and moderate aortic valve stenosis with trace regurgitation and mild ascending aorta dilation. Patient denies recent chest pain or NTG use, denies exercise induced dyspnea or PND. Last ALEJANDRA 7/30/19 was stable, EKG on 01/28/2020 was normal.    DEPRESSION - Patient has a long history of Depression of moderate severity requiring medication for control with recent symptoms being stable..Current symptoms of depression include none.     HYPERLIPIDEMIA - Patient has a long history of significant Hyperlipidemia requiring medication for treatment with recent poor control. Patient reports no problems or side effects with the medication.     Recent Labs   Lab Test 12/04/19  1541 06/26/19  1012  07/28/15  1704 08/01/14  0957   CHOL 303* 220*   < > 227* 249*   HDL 84 99   < > 150 93   * 109*   < > 65 125   TRIG 108 62   < > 60 156*   CHOLHDLRATIO  --   --   --  1.5 2.7    < > = values in this interval not displayed.     HYPERTENSION - Patient has longstanding history of HTN , currently denies any symptoms referable to elevated blood pressure. Specifically denies chest pain, palpitations, dyspnea, orthopnea, PND or peripheral edema. Blood pressure readings have been in normal range. Current medication regimen is as listed below. Patient denies any side effects of medication.     BP Readings from Last 3 Encounters:   01/28/20 114/68   12/04/19 108/70   07/30/19 101/56     RENAL INSUFFICIENCY - Patient has a longstanding  history of moderate-severe chronic renal insufficiency. Last Creatine  Creatinine   Date Value Ref Range Status   01/28/2020 0.99 0.52 - 1.04 mg/dL Final   12/04/2019 1.19 (H) 0.52 - 1.04 mg/dL Final   07/12/2019 1.24 0.70 - 1.30 mg/dL Final   06/26/2019 1.32 (H) 0.52 - 1.04 mg/dL Final   08/04/2018 0.72 0.52 - 1.04 mg/dL Final       MEDICAL HISTORY:     Patient Active Problem List    Diagnosis Date Noted     Alcohol dependence in remission (H) 01/28/2020     Priority: Medium     Psoriasis - on Humira - Dr. Gauri Clark with dermatology 12/04/2019     Priority: Medium     Aortic dilatation (H) 12/04/2019     Priority: Medium     Mild major depression (H) 12/04/2019     Priority: Medium     Thiamine deficiency 12/04/2019     Priority: Medium     Herpes simplex virus infection 12/04/2019     Priority: Medium     Mild ascending aorta dilatation (H)      Priority: Medium     Coronary artery disease involving native coronary artery of native heart without angina pectoris 10/16/2018     Priority: Medium     Minimal coronary artery disease on coronary angiogram in 2015.        Liver disease, chronic, due to alcohol (H) 08/15/2018     Priority: Medium     Paroxysmal supraventricular tachycardia (H) 08/15/2018     Priority: Medium     Anxiety and depression 08/03/2018     Priority: Medium     Severe alcohol dependence (H) 02/12/2018     Priority: Medium     Vitamin B12 deficiency (non anemic) 02/06/2018     Priority: Medium     Depression, unspecified depression type 07/14/2017     Priority: Medium     Cellulitis 06/06/2017     Priority: Medium     Hyponatremia 06/04/2017     Priority: Medium     Controlled substance agreement signed 12/15/2016     Priority: Medium     Seasonal affective disorder (H) 12/15/2016     Priority: Medium     HTN, goal below 140/90 12/15/2016     Priority: Medium     Gastroesophageal reflux disease, esophagitis presence not specified 04/17/2016     Priority: Medium     PAC (premature atrial  contraction) 03/01/2016     Priority: Medium     Left-sided low back pain without sciatica 07/02/2015     Priority: Medium     Aortic stenosis 06/07/2015     Priority: Medium     Anxiety 07/10/2014     Priority: Medium     Chronic pain syndrome      Priority: Medium     Patient is followed by Alison Pena MD for ongoing prescription of pain medication.  All refills should be approved by this provider, or covering partner.    Medication(s): tramadol 20 mg BID prn pain.   Maximum quantity per month: 60  Clinic visit frequency required: Q 6  months     Controlled substance agreement:  Encounter-Level CSA:     There are no encounter-level csa.        Pain Clinic evaluation in the past: No    DIRE Total Score(s):  No flowsheet data found.    Last [facility] website verification:  none   [email or web address]       Bariatric surgery status      Priority: Medium     gastric bypass, [facility] and       Mixed hyperlipidemia      Priority: Medium     Macrocytic anemia      Priority: Medium     Mild macrocytic anemia, 2012 to present, likely based on alcohol abuse.       CKD (chronic kidney disease) stage 3, GFR 30-59 ml/min (H) 11/28/2012     Priority: Medium     Chronic insomnia      Priority: Medium     Spinal stenosis 12/21/2010     Priority: Medium     Personal history of urinary calculi 06/01/2006     Priority: Medium     left ureteral stone,lithotripsy       Knee joint replacement status 12/01/2005     Priority: Medium     left       Hip joint replacement status 04/01/2004     Priority: Medium     right        Past Medical History:   Diagnosis Date     Alcohol abuse     long term alcohol abuse     Anxiety disorder      Ascending aorta dilatation (H)      Bariatric surgery status 1996?    gastric bypass, [employer] [facility] [employer] and     Benign hypertension      Chronic insomnia      Chronic pain syndrome     Chronic back and neck pain, chronic pain due to osteoarthritis multiple joints     Coronary artery disease 9/2015    mild  distal branch disease on cath     Coronary artery disease involving native coronary artery of native heart without angina pectoris 10/16/2018    Minimal coronary artery disease on coronary angiogram in 2015.      Hip joint replacement status 4/2004    right     Knee joint replacement status 12/2005    left     Macrocytic anemia     Mild macrocytic anemia, 2012 to present, likely based on alcohol abuse.     Major depressive disorder, single episode, severe, without mention of psychotic behavior      Mixed hyperlipidemia      Moderate aortic stenosis 5/2014    mild/mod AS with peak gradient 33/mean gradient 20 mmHg, AV area 1.2     Pelvic relaxation disorder     Surgical intervention for cystocele/rectocele 3,11/2012     Personal history of urinary calculi 6/2006    left ureteral stone,lithotripsy     PVC (premature ventricular contraction)      Sinus tachycardia      Stage III chronic kidney disease (H) 2005     SVT (supraventricular tachycardia) (H)     likely atrial tachycardia     Past Surgical History:   Procedure Laterality Date     APPENDECTOMY  3/2004    incidental     C GASTRIC BYPASS,OBESE<100CM ARIANNA-EN-Y  1996     C MEDIASTINOSCOPY W OR WO BIOPSY  2/2008    Videomediastinoscopy and, for mediastinal adenopathy -reactive lymphoid hyperplasia     C REPAIR OF RECTOCELE  3/2012     C TOTAL KNEE ARTHROPLASTY  12/2005    left      CARPAL TUNNEL RELEASE RT/LT  10/2010    Carpometacarpal excisional arthroplasty with a fascial autograft and APL suspension sling (26437). 2. Left thumb metacarpophalangeal joint fusion with autologous bone graft (25183). 3. Left endoscopic carpal tunnel release      CHOLECYSTECTOMY, LAPOROSCOPIC  11/2010    Cholecystectomy, Laparoscopic     COLONOSCOPY N/A 9/8/2016    Procedure: COMBINED COLONOSCOPY, SINGLE OR MULTIPLE BIOPSY/POLYPECTOMY BY BIOPSY;  Surgeon: Moe Barlow MD;  Location:  GI     CYSTOCELE REPAIR  11/2012    davinc laparoscopic sacrocolpopexy, enterocele repair,  lysis of adhesions, placement of retropubic mid urethral sling, cystoscopy     CYSTOSCOPY, LITHOTRIPSY, COMBINED  6/2006    Left extracorporeal shock wave lithotripsy, cystoscopy, left ureteral stent placement.     CYSTOSCOPY, REMOVE STENT(S), COMBINED  7/2006    Cystoscopy, removal of left ureteral stent, retrograde pyelography, flexible and rigid ureteroscopy and holmium laser lithotripsy, basket removal of stone fragments, ureteral stent placement.      ENDOSCOPIC ULTRASOUND UPPER GASTROINTESTINAL TRACT (GI) N/A 6/12/2017    Procedure: ENDOSCOPIC ULTRASOUND, ESOPHAGOSCOPY / UPPER GASTROINTESTINAL TRACT (GI);  ENDOSCOPIC ULTRASOUND, ESOPHAGOSCOPY / UPPER GASTROINTESTINAL TRACT (GI);  Surgeon: Parth Graham MD;  Location: [facility]     HERNIA REPAIR  4/2012    bilateral augmentation mastopexy, ventral hernia repair, and medial thigh liposuction on 04/06/2012.      HYSTERECTOMY VAGINAL, BILATERAL SALPINGO-OOPHERECTOMY, COMBINED  1998    due to myoma and bleeding     JOINT REPLACEMENT, HIP RT/LT  4/2004    right total hip arthroplasty     LAPAROTOMY, LYSIS ADHESIONS, COMBINED  3/2004    lysis adhesions, ventral hernia repair, appendectomy incidentally     LYMPH NODE BIOPSY  4/2008    right axillary, reactive follicular and paracortical hyperplasia.     MAMMOPLASTY AUGMENTATION BILATERAL  4/2012     REVISE RECONSTRUCTED BREAST  6/7/2012    Left breast capsulotomy.      Current Outpatient Medications   Medication Sig Dispense Refill     aspirin (ASA) 325 MG EC tablet Take 1 tablet (325 mg) by mouth daily       atenolol (TENORMIN) 25 MG tablet Take 1 tablet (25 mg) by mouth every morning 90 tablet 1     baclofen (LIORESAL) 10 MG tablet Take 1 tablet (10 mg) by mouth 2 times daily 180 tablet 1     buPROPion (WELLBUTRIN XL) 150 MG 24 hr tablet Take 1 tablet (150 mg) by mouth every morning 90 tablet 1     citalopram (CELEXA) 20 MG tablet Take 1 tablet (20 mg) by mouth daily 90 tablet 1     furosemide (LASIX) 20 MG  "tablet Take 1 tablet (20 mg) by mouth daily 90 tablet 0     gabapentin (NEURONTIN) 300 MG capsule TAKE 1 CAPSULE BY MOUTH THREE TIMES A DAY 90 capsule 1     HUMIRA PEN-PS/UV/ADOL HS START 40 MG/0.8ML pen kit Inject 0.8 mLs (40 mg) Subcutaneous every 14 days  0     hydrocortisone (CORTAID) 1 % external ointment Apply topically 3 times daily for 5 days To corners of mouth       hydrOXYzine (ATARAX) 25 MG tablet Take 3 tablets (75 mg) by mouth At Bedtime. May also take 1 tablet (25 mg) daily as needed for anxiety. 135 tablet 1     Multiple Vitamins-Minerals (CENTRUM SILVER) per tablet Take 1 tablet by mouth daily       mupirocin (BACTROBAN) 2 % external ointment Apply topically 3 times daily for 5 days TO CORNERS OF MOUTH 22 g 0     nystatin (MYCOSTATIN) 255433 UNIT/GM external ointment Apply topically 3 times daily for 5 days To corners of mouth 15 g 0     polyethylene glycol (MIRALAX/GLYCOLAX) Packet Take 17 g by mouth daily as needed (constipation) 7 packet      senna-docusate (SENOKOT-S;PERICOLACE) 8.6-50 MG per tablet Take 1-2 tablets by mouth 2 times daily as needed for constipation 100 tablet      STATIN NOT PRESCRIBED, INTENTIONAL, Please choose reason not prescribed, below       traZODone (DESYREL) 100 MG tablet TAKE 1 TABLET (100 MG) BY MOUTH NIGHTLY AS NEEDED FOR SLEEP 90 tablet 1     valACYclovir (VALTREX) 1000 mg tablet TAKE 2 TABS BY MOUTH EVERY 12 HOURS FOR 1 DAY ONLY FOR OUTBREAKS OF COLD SORES AS NEEDED 4 tablet 2     OTC products: None, except as noted above    Allergies   Allergen Reactions     Bactrim [Sulfamethoxazole W/Trimethoprim] Hives     Codeine Itching     NAUSEA     Morphine Itching     NAUSEA      Latex Allergy: NO    Social History     Tobacco Use     Smoking status: Never Smoker     Smokeless tobacco: Never Used   Substance Use Topics     Alcohol use: Yes     Alcohol/week: 63.0 standard drinks     Types: 63 Standard drinks or equivalent per week     Comment: three \"3oz\" liquor drinks " "nightly     History   Drug Use No       REVIEW OF SYSTEMS:   CONSTITUTIONAL: NEGATIVE for fever, chills, change in weight  INTEGUMENTARY/SKIN: NEGATIVE for worrisome rashes, moles or lesions  EYES: NEGATIVE for vision changes or irritation  ENT/MOUTH: NEGATIVE for ear, mouth and throat problems  RESP: NEGATIVE for significant cough or SOB  BREAST: NEGATIVE for masses, tenderness or discharge  CV: NEGATIVE for chest pain, palpitations or peripheral edema  GI: NEGATIVE for nausea, abdominal pain, heartburn, or change in bowel habits  : NEGATIVE for frequency, dysuria, or hematuria  MUSCULOSKELETAL: NEGATIVE for significant arthralgias or myalgia  NEURO: NEGATIVE for weakness, dizziness or paresthesias  ENDOCRINE: NEGATIVE for temperature intolerance, skin/hair changes  HEME: NEGATIVE for bleeding problems  PSYCHIATRIC: NEGATIVE for changes in mood or affect    Constitutional, neuro, ENT, endocrine, pulmonary, cardiac, gastrointestinal, genitourinary, musculoskeletal, integument and psychiatric systems are negative, except as otherwise noted.  This document serves as a record of the services and decisions personally performed and made by Nayeli Castorena, MS, PA-C. It was created on her behalf by Heydi March, a trained medical scribe. The creation of this document is based the provider's statements to the medical scribe.  Heydi March January 28, 2020 2:03 PM  EXAM:   /68 (BP Location: Left arm, Cuff Size: Adult Regular)   Pulse 75   Temp 98.1  F (36.7  C) (Oral)   Ht 1.626 m (5' 4.02\")   Wt 70.3 kg (155 lb)   SpO2 98%   BMI 26.59 kg/m    GENERAL APPEARANCE: healthy, alert and no distress  EYES: EOMI, PERRL  HENT: ear canals and TM's normal and nose and mouth without ulcers or lesions  NECK: no adenopathy, no asymmetry, masses, or scars and thyroid normal to palpation RESP: lungs clear to auscultation - no rales, rhonchi or wheezes  CV:  III/VI systolic ejection murmur. Otherwise, regular rates and rhythm, " normal S1 S2, no S3 or S4 and click or rub  ABDOMEN:  soft, nontender, no HSM or masses and bowel sounds normal  MS: Reverse Bunion deformity of right great toe and hammertoe deformity of the 2nd and 3rd digit of the right foot. Otherwise, extremities normal- no evidence of inflammation in joints, FROM in all extremities.  SKIN: Multiple area of swelling c/w cold sores around lip and fissure formation in the corners of the mouth with overlying yellow crust.  Lips VERY dry. Otherwise,  no suspicious lesions or rashes  NEURO: Normal strength and tone, sensory exam grossly normal, mentation intact and speech normal   PSYCH: mentation appears normal. and affect normal/bright   LYMPHATICS: No cervical adenopathy    DIAGNOSTICS:   EKG: Normal Sinus Rhythm, normal axis, normal intervals, no acute ST/T changes c/w ischemia, no LVH by voltage criteria, unchanged from 07/24/19.    Recent Labs   Lab Test 12/04/19  1541 07/12/19  1117 06/26/19  1612  06/16/17  0845  06/11/17  1540   HGB 12.6  --  13.2   < > 9.2*   < >  --      --  286   < > 106*   < >  --    INR  --   --   --   --  1.03  --  1.15*    134* 132*   < > 137   < >  --    POTASSIUM 4.0 4.2 4.9   < > 3.5   < >  --    CR 1.19* 1.24 1.32*   < > 0.74   < >  --     < > = values in this interval not displayed.      Results for orders placed or performed in visit on 01/28/20   CBC with platelets     Status: Abnormal   Result Value Ref Range    WBC 7.4 4.0 - 11.0 10e9/L    RBC Count 3.75 (L) 3.8 - 5.2 10e12/L    Hemoglobin 11.7 11.7 - 15.7 g/dL    Hematocrit 35.3 35.0 - 47.0 %    MCV 94 78 - 100 fl    MCH 31.2 26.5 - 33.0 pg    MCHC 33.1 31.5 - 36.5 g/dL    RDW 14.6 10.0 - 15.0 %    Platelet Count 306 150 - 450 10e9/L   Comprehensive metabolic panel     Status: Abnormal   Result Value Ref Range    Sodium 139 133 - 144 mmol/L    Potassium 3.6 3.4 - 5.3 mmol/L    Chloride 103 94 - 109 mmol/L    Carbon Dioxide 30 20 - 32 mmol/L    Anion Gap 6 3 - 14 mmol/L     Glucose 113 (H) 70 - 99 mg/dL    Urea Nitrogen 36 (H) 7 - 30 mg/dL    Creatinine 0.99 0.52 - 1.04 mg/dL    GFR Estimate 56 (L) >60 mL/min/[1.73_m2]    GFR Estimate If Black 64 >60 mL/min/[1.73_m2]    Calcium 9.7 8.5 - 10.1 mg/dL    Bilirubin Total 0.3 0.2 - 1.3 mg/dL    Albumin 3.8 3.4 - 5.0 g/dL    Protein Total 7.9 6.8 - 8.8 g/dL    Alkaline Phosphatase 44 40 - 150 U/L    ALT 19 0 - 50 U/L    AST 21 0 - 45 U/L       IMPRESSION:   Reason for surgery/procedure: Bunion and hammertoe of the right foot  Diagnosis/reason for consult: Preoperative Clearance    The proposed surgical procedure is considered LOW risk.    REVISED CARDIAC RISK INDEX  The patient has the following serious cardiovascular risks for perioperative complications such as (MI, PE, VFib and 3  AV Block):  Coronary Artery Disease (MI, positive stress test, angina, Qs on EKG)  INTERPRETATION: 0 risks: Class I (very low risk - 0.4% complication rate)    The patient has the following additional risks for perioperative complications:  Immunosuppression: Watch closely for signs of infection or delayed healing  GERD: Position considerations during procedure      ICD-10-CM    1. Preop general physical exam Z01.818 EKG 12-lead complete w/read - Clinics     CBC with platelets     Comprehensive metabolic panel   2. Bunion, right M21.611    3. Hammertoe of right foot M20.41    4. Psoriasis - on Humira - Dr. Gauri Clark with dermatology L40.9    5. Immunocompromised state due to drug therapy Z79.899    6. HTN, goal below 140/90 I10    7. CKD (chronic kidney disease) stage 3, GFR 30-59 ml/min (H) N18.3    8. Angular cheilitis with candidiasis B37.0 mupirocin (BACTROBAN) 2 % external ointment     hydrocortisone (CORTAID) 1 % external ointment     nystatin (MYCOSTATIN) 011761 UNIT/GM external ointment   9. Mild major depression (H) F32.0 atenolol (TENORMIN) 25 MG tablet     buPROPion (WELLBUTRIN XL) 150 MG 24 hr tablet     citalopram (CELEXA) 20 MG tablet      hydrOXYzine (ATARAX) 25 MG tablet   10. Alcohol dependence in remission (H) F10.21    11. Mixed hyperlipidemia E78.2    12. Anxiety and depression F41.9 atenolol (TENORMIN) 25 MG tablet    F32.9 buPROPion (WELLBUTRIN XL) 150 MG 24 hr tablet     citalopram (CELEXA) 20 MG tablet     hydrOXYzine (ATARAX) 25 MG tablet   13. Chronic pain syndrome G89.4 baclofen (LIORESAL) 10 MG tablet     gabapentin (NEURONTIN) 300 MG capsule   14. Benign hypertension I10 furosemide (LASIX) 20 MG tablet   15. Insomnia, unspecified type G47.00 traZODone (DESYREL) 100 MG tablet   16. Herpes simplex virus infection B00.9 valACYclovir (VALTREX) 1000 mg tablet   17. Liver disease, chronic, due to alcohol (H) K70.9    18. Aortic dilatation (H) I77.819    19. Paroxysmal supraventricular tachycardia (H) I47.1        RECOMMENDATIONS:       --Patient is to take all scheduled medications on the day of surgery EXCEPT for modifications listed below.    HOLD humira on Friday 1/31/2020, resume on 2/14/2020.    Morning of procedure: Take with a sip of water atenolol, baclofen, bupropion, citalopram, furosemide, and gabapentin.    Okay to take trazodone at night prior to procedure.    Hold from now until after procedure: Aspirin, NSAIDs (ibuprofen, Motrin, Aleve products) and any vitamins or supplements.   Tylenol products okay.      APPROVAL GIVEN to proceed with proposed procedure, without further diagnostic evaluation       Angular cheilitis with candidiasis  Symptomatic. Likely secondary to HSV. Take 2nd dose of Valtrex for flare and the topicals listed below to avoid superficial infection. Recommend patient mix all three topical and apply to corner of mouth. Advise she place Aquaphor liberally around her lips to help with dryness.  -     mupirocin (BACTROBAN) 2 % external ointment; Apply topically 3 times daily for 5 days TO CORNERS OF MOUTH  -     hydrocortisone (CORTAID) 1 % external ointment; Apply topically 3 times daily for 5 days To corners of  mouth  -     nystatin (MYCOSTATIN) 378949 UNIT/GM external ointment; Apply topically 3 times daily for 5 days To corners of mouth        Signed Electronically by: CLEO Gomez MD, FAAFP     [facility]  [address] [place] [id number]  [email or web address]   Office: [phone]  Fax: [phone]  Pager: [phone]           Copy of this evaluation report is provided to requesting physician.    [facility] Preop Guidelines    Revised Cardiac Risk Index

## 2020-01-28 NOTE — LETTER
[facility]  [address] [place] [address]                              [phone]   January 29, 2020    Kavitha Headley  [address] [facility] [address]      Dear Linda,    Here is a summary of your recent test results:    Your labs are stable.      Good luck with your surgery!        Please call us at [phone] (or use [employer]) to address the above recommendations.            Thank you very much for choosing [facility].     Best regards,      Nayeli Castorena PA-C / Rachel Gomez MA        Results for orders placed or performed in visit on 01/28/20   CBC with platelets     Status: Abnormal   Result Value Ref Range    WBC 7.4 4.0 - 11.0 10e9/L    RBC Count 3.75 (L) 3.8 - 5.2 10e12/L    Hemoglobin 11.7 11.7 - 15.7 g/dL    Hematocrit 35.3 35.0 - 47.0 %    MCV 94 78 - 100 fl    MCH 31.2 26.5 - 33.0 pg    MCHC 33.1 31.5 - 36.5 g/dL    RDW 14.6 10.0 - 15.0 %    Platelet Count 306 150 - 450 10e9/L   Comprehensive metabolic panel     Status: Abnormal   Result Value Ref Range    Sodium 139 133 - 144 mmol/L    Potassium 3.6 3.4 - 5.3 mmol/L    Chloride 103 94 - 109 mmol/L    Carbon Dioxide 30 20 - 32 mmol/L    Anion Gap 6 3 - 14 mmol/L    Glucose 113 (H) 70 - 99 mg/dL    Urea Nitrogen 36 (H) 7 - 30 mg/dL    Creatinine 0.99 0.52 - 1.04 mg/dL    GFR Estimate 56 (L) >60 mL/min/[1.73_m2]    GFR Estimate If Black 64 >60 mL/min/[1.73_m2]    Calcium 9.7 8.5 - 10.1 mg/dL    Bilirubin Total 0.3 0.2 - 1.3 mg/dL    Albumin 3.8 3.4 - 5.0 g/dL    Protein Total 7.9 6.8 - 8.8 g/dL    Alkaline Phosphatase 44 40 - 150 U/L    ALT 19 0 - 50 U/L    AST 21 0 - 45 U/L

## 2020-01-28 NOTE — TELEPHONE ENCOUNTER
PA Initiation    Medication: hydrOXYzine (ATARAX) 25 MG tablet - INITIATED  Insurance Company: [employer] - Phone [phone] Fax [phone]  Pharmacy Filling the Rx: [facility] - [place], [place] - [address] [place] [address]  Filling Pharmacy Phone: [phone]  Filling Pharmacy Fax: [phone]  Start Date: 1/28/2020    Received question set from PA initiated by patient or clinic. Completed form and faxed back to plan at [phone].    Mckenzie OATES  [place] PA Team

## 2020-01-28 NOTE — PATIENT INSTRUCTIONS
HOLD humira on Friday 1/31/2020, resume on 2/14/2020.    Morning of procedure: Take with a sip of water atenolol, baclofen, bupropion, citalopram, furosemide, and gabapentin.    Okay to take trazodone at night prior to procedure.    Hold from now until after procedure: Aspirin, NSAIDs (ibuprofen, Motrin, Aleve products) and any vitamins or supplements.   Tylenol products okay.    Before Your Surgery      Call your surgeon if there is any change in your health. This includes signs of a cold or flu (such as a sore throat, runny nose, cough, rash or fever).    Do not smoke, drink alcohol or take over the counter medicine (unless your surgeon or primary care doctor tells you to) for the 24 hours before and after surgery.    If you take prescribed drugs: Follow your doctor s orders about which medicines to take and which to stop until after surgery.    Eating and drinking prior to surgery: follow the instructions from your surgeon    Take a shower or bath the night before surgery. Use the soap your surgeon gave you to gently clean your skin. If you do not have soap from your surgeon, use your regular soap. Do not shave or scrub the surgery site.  Wear clean pajamas and have clean sheets on your bed.

## 2020-01-29 ENCOUNTER — TELEPHONE (OUTPATIENT)
Dept: FAMILY MEDICINE | Facility: CLINIC | Age: 75
End: 2020-01-29

## 2020-01-29 DIAGNOSIS — B00.9 HERPES SIMPLEX VIRUS INFECTION: Primary | ICD-10-CM

## 2020-01-29 LAB
ALBUMIN SERPL-MCNC: 3.8 G/DL (ref 3.4–5)
ALP SERPL-CCNC: 44 U/L (ref 40–150)
ALT SERPL W P-5'-P-CCNC: 19 U/L (ref 0–50)
ANION GAP SERPL CALCULATED.3IONS-SCNC: 6 MMOL/L (ref 3–14)
AST SERPL W P-5'-P-CCNC: 21 U/L (ref 0–45)
BILIRUB SERPL-MCNC: 0.3 MG/DL (ref 0.2–1.3)
BUN SERPL-MCNC: 36 MG/DL (ref 7–30)
CALCIUM SERPL-MCNC: 9.7 MG/DL (ref 8.5–10.1)
CHLORIDE SERPL-SCNC: 103 MMOL/L (ref 94–109)
CO2 SERPL-SCNC: 30 MMOL/L (ref 20–32)
CREAT SERPL-MCNC: 0.99 MG/DL (ref 0.52–1.04)
GFR SERPL CREATININE-BSD FRML MDRD: 56 ML/MIN/{1.73_M2}
GLUCOSE SERPL-MCNC: 113 MG/DL (ref 70–99)
POTASSIUM SERPL-SCNC: 3.6 MMOL/L (ref 3.4–5.3)
PROT SERPL-MCNC: 7.9 G/DL (ref 6.8–8.8)
SODIUM SERPL-SCNC: 139 MMOL/L (ref 133–144)

## 2020-01-29 RX ORDER — VALACYCLOVIR HYDROCHLORIDE 500 MG/1
500 TABLET, FILM COATED ORAL 3 TIMES DAILY
Qty: 15 TABLET | Refills: 0 | Status: ON HOLD | OUTPATIENT
Start: 2020-01-29 | End: 2020-02-12

## 2020-01-29 NOTE — TELEPHONE ENCOUNTER
Pt calling Message handled by Nurse Triage with [facility] - provider name: Tim RUIZ - pt get cold sores all around her mouth - she needs to have valtrex 500mg TID for 5 days .      Advised pt on the information above     Patient stated an understanding and agreed with plan.    Order placed     Ryann Harrison RN, BSN  [facility]

## 2020-01-29 NOTE — TELEPHONE ENCOUNTER
Team: Please advise pt that she is overdue for f/u with Dr. Ugarte in cardiology for her aortic stenosis.    She should schedule at her earliest convenience.

## 2020-01-29 NOTE — TELEPHONE ENCOUNTER
Reason for call:  Patient reporting a symptom    Symptom or request: Linda is calling saying she saw Nayeli yesterday. She said her cold sores on her lips look worse today. She wants to talk with a nurse about what to do.    Phone Number patient can be reached at:  Cell number on file:    Telephone Information:   Mobile [phone]     Best Time:  Anytime    Can we leave a detailed message on this number:  YES    Call taken on 1/29/2020 at 1:13 PM by Sofía Villatoro

## 2020-01-29 NOTE — RESULT ENCOUNTER NOTE
Linda  Here are your recent results.  Your labs are stable.    Good luck with your surgery!    If you have any questions please do not hesitate to contact our office via phone ([phone]) or MyChart.    Nayeli Castorena MS, PA-C  [facility]

## 2020-01-29 NOTE — LETTER
[address] [address]                                                                                                       [phone]    January 29, 2020    Kavitha Headley  [address] [facility] [address]      Nayeli Mckeon PA-C wanted us to advice you that you are currently overdue for your follow up with Dr. Ugarte in cardiology regarding your aortic stenosis.     Please schedule at your earliest convenience.        Sincerely,    Nayeli Castorena PA-C / Rachel Gomez MA

## 2020-01-31 ENCOUNTER — HOSPITAL ENCOUNTER (OUTPATIENT)
Facility: CLINIC | Age: 75
Discharge: HOME | End: 2020-01-31
Attending: ORTHOPAEDIC SURGERY | Admitting: ORTHOPAEDIC SURGERY
Payer: COMMERCIAL

## 2020-01-31 ENCOUNTER — ANESTHESIA (OUTPATIENT)
Dept: SURGERY | Facility: CLINIC | Age: 75
End: 2020-01-31
Payer: COMMERCIAL

## 2020-01-31 ENCOUNTER — ANESTHESIA EVENT (OUTPATIENT)
Dept: SURGERY | Facility: CLINIC | Age: 75
End: 2020-01-31
Payer: COMMERCIAL

## 2020-01-31 VITALS
HEART RATE: 79 BPM | WEIGHT: 175 LBS | HEIGHT: 64 IN | RESPIRATION RATE: 16 BRPM | OXYGEN SATURATION: 93 % | TEMPERATURE: 98.4 F | SYSTOLIC BLOOD PRESSURE: 103 MMHG | BODY MASS INDEX: 29.88 KG/M2 | DIASTOLIC BLOOD PRESSURE: 69 MMHG

## 2020-01-31 DIAGNOSIS — M20.21 HALLUX RIGIDUS OF RIGHT FOOT: Primary | ICD-10-CM

## 2020-01-31 PROCEDURE — 36000056 ZZH SURGERY LEVEL 3 1ST 30 MIN: Performed by: ORTHOPAEDIC SURGERY

## 2020-01-31 PROCEDURE — 25800030 ZZH RX IP 258 OP 636: Performed by: NURSE ANESTHETIST, CERTIFIED REGISTERED

## 2020-01-31 PROCEDURE — 25000132 ZZH RX MED GY IP 250 OP 250 PS 637: Performed by: ORTHOPAEDIC SURGERY

## 2020-01-31 PROCEDURE — 71000027 ZZH RECOVERY PHASE 2 EACH 15 MINS: Performed by: ORTHOPAEDIC SURGERY

## 2020-01-31 PROCEDURE — 27110028 ZZH OR GENERAL SUPPLY NON-STERILE: Performed by: ORTHOPAEDIC SURGERY

## 2020-01-31 PROCEDURE — 27210794 ZZH OR GENERAL SUPPLY STERILE: Performed by: ORTHOPAEDIC SURGERY

## 2020-01-31 PROCEDURE — 37000008 ZZH ANESTHESIA TECHNICAL FEE, 1ST 30 MIN: Performed by: ORTHOPAEDIC SURGERY

## 2020-01-31 PROCEDURE — 37000009 ZZH ANESTHESIA TECHNICAL FEE, EACH ADDTL 15 MIN: Performed by: ORTHOPAEDIC SURGERY

## 2020-01-31 PROCEDURE — 25000128 H RX IP 250 OP 636: Performed by: NURSE ANESTHETIST, CERTIFIED REGISTERED

## 2020-01-31 PROCEDURE — 25000128 H RX IP 250 OP 636: Performed by: ORTHOPAEDIC SURGERY

## 2020-01-31 PROCEDURE — 25000128 H RX IP 250 OP 636: Performed by: ANESTHESIOLOGY

## 2020-01-31 PROCEDURE — 25000125 ZZHC RX 250: Performed by: ORTHOPAEDIC SURGERY

## 2020-01-31 PROCEDURE — 25000566 ZZH SEVOFLURANE, EA 15 MIN: Performed by: ORTHOPAEDIC SURGERY

## 2020-01-31 PROCEDURE — 40000170 ZZH STATISTIC PRE-PROCEDURE ASSESSMENT II: Performed by: ORTHOPAEDIC SURGERY

## 2020-01-31 PROCEDURE — 25000125 ZZHC RX 250: Performed by: NURSE ANESTHETIST, CERTIFIED REGISTERED

## 2020-01-31 PROCEDURE — C1713 ANCHOR/SCREW BN/BN,TIS/BN: HCPCS | Performed by: ORTHOPAEDIC SURGERY

## 2020-01-31 PROCEDURE — 71000013 ZZH RECOVERY PHASE 1 LEVEL 1 EA ADDTL HR: Performed by: ORTHOPAEDIC SURGERY

## 2020-01-31 PROCEDURE — 36000058 ZZH SURGERY LEVEL 3 EA 15 ADDTL MIN: Performed by: ORTHOPAEDIC SURGERY

## 2020-01-31 PROCEDURE — 71000012 ZZH RECOVERY PHASE 1 LEVEL 1 FIRST HR: Performed by: ORTHOPAEDIC SURGERY

## 2020-01-31 DEVICE — IMP WIRE KIRSCHNER 0.054X4" 1645-10-000: Type: IMPLANTABLE DEVICE | Site: FOOT | Status: FUNCTIONAL

## 2020-01-31 DEVICE — IMPLANTABLE DEVICE: Type: IMPLANTABLE DEVICE | Site: FOOT | Status: FUNCTIONAL

## 2020-01-31 RX ORDER — ONDANSETRON 2 MG/ML
4 INJECTION INTRAMUSCULAR; INTRAVENOUS EVERY 6 HOURS PRN
Status: DISCONTINUED | OUTPATIENT
Start: 2020-01-31 | End: 2020-01-31 | Stop reason: HOSPADM

## 2020-01-31 RX ORDER — EPHEDRINE SULFATE 50 MG/ML
INJECTION, SOLUTION INTRAMUSCULAR; INTRAVENOUS; SUBCUTANEOUS PRN
Status: DISCONTINUED | OUTPATIENT
Start: 2020-01-31 | End: 2020-01-31

## 2020-01-31 RX ORDER — ONDANSETRON 4 MG/1
4 TABLET, ORALLY DISINTEGRATING ORAL EVERY 30 MIN PRN
Status: DISCONTINUED | OUTPATIENT
Start: 2020-01-31 | End: 2020-01-31 | Stop reason: HOSPADM

## 2020-01-31 RX ORDER — HYDROXYZINE HYDROCHLORIDE 25 MG/1
25 TABLET, FILM COATED ORAL EVERY MORNING
COMMUNITY
End: 2020-02-24

## 2020-01-31 RX ORDER — BUPIVACAINE HYDROCHLORIDE 2.5 MG/ML
INJECTION, SOLUTION EPIDURAL; INFILTRATION; INTRACAUDAL PRN
Status: DISCONTINUED | OUTPATIENT
Start: 2020-01-31 | End: 2020-01-31 | Stop reason: HOSPADM

## 2020-01-31 RX ORDER — PROPOFOL 10 MG/ML
INJECTION, EMULSION INTRAVENOUS PRN
Status: DISCONTINUED | OUTPATIENT
Start: 2020-01-31 | End: 2020-01-31

## 2020-01-31 RX ORDER — ONDANSETRON 2 MG/ML
INJECTION INTRAMUSCULAR; INTRAVENOUS PRN
Status: DISCONTINUED | OUTPATIENT
Start: 2020-01-31 | End: 2020-01-31

## 2020-01-31 RX ORDER — FENTANYL CITRATE 0.05 MG/ML
25-50 INJECTION, SOLUTION INTRAMUSCULAR; INTRAVENOUS
Status: DISCONTINUED | OUTPATIENT
Start: 2020-01-31 | End: 2020-01-31 | Stop reason: HOSPADM

## 2020-01-31 RX ORDER — HYDROXYZINE HYDROCHLORIDE 25 MG/1
50 TABLET, FILM COATED ORAL AT BEDTIME
COMMUNITY
End: 2020-03-24

## 2020-01-31 RX ORDER — ONDANSETRON 2 MG/ML
4 INJECTION INTRAMUSCULAR; INTRAVENOUS EVERY 30 MIN PRN
Status: DISCONTINUED | OUTPATIENT
Start: 2020-01-31 | End: 2020-01-31 | Stop reason: HOSPADM

## 2020-01-31 RX ORDER — ONDANSETRON 4 MG/1
4 TABLET, ORALLY DISINTEGRATING ORAL EVERY 6 HOURS PRN
Status: DISCONTINUED | OUTPATIENT
Start: 2020-01-31 | End: 2020-01-31 | Stop reason: HOSPADM

## 2020-01-31 RX ORDER — NALOXONE HYDROCHLORIDE 0.4 MG/ML
.1-.4 INJECTION, SOLUTION INTRAMUSCULAR; INTRAVENOUS; SUBCUTANEOUS
Status: DISCONTINUED | OUTPATIENT
Start: 2020-01-31 | End: 2020-01-31 | Stop reason: HOSPADM

## 2020-01-31 RX ORDER — IBUPROFEN 600 MG/1
600 TABLET, FILM COATED ORAL EVERY 6 HOURS PRN
Qty: 60 TABLET | Refills: 0 | Status: ON HOLD | OUTPATIENT
Start: 2020-01-31 | End: 2020-02-15

## 2020-01-31 RX ORDER — IBUPROFEN 600 MG/1
600 TABLET, FILM COATED ORAL EVERY 6 HOURS PRN
Status: DISCONTINUED | OUTPATIENT
Start: 2020-01-31 | End: 2020-01-31 | Stop reason: HOSPADM

## 2020-01-31 RX ORDER — HYDROMORPHONE HYDROCHLORIDE 2 MG/1
2 TABLET ORAL EVERY 4 HOURS PRN
Status: DISCONTINUED | OUTPATIENT
Start: 2020-01-31 | End: 2020-01-31 | Stop reason: HOSPADM

## 2020-01-31 RX ORDER — HYDROMORPHONE HYDROCHLORIDE 2 MG/1
2 TABLET ORAL EVERY 4 HOURS PRN
Qty: 30 TABLET | Refills: 0 | Status: ON HOLD | OUTPATIENT
Start: 2020-01-31 | End: 2020-02-15

## 2020-01-31 RX ORDER — HYDROMORPHONE HYDROCHLORIDE 1 MG/ML
0.2 INJECTION, SOLUTION INTRAMUSCULAR; INTRAVENOUS; SUBCUTANEOUS
Status: DISCONTINUED | OUTPATIENT
Start: 2020-01-31 | End: 2020-01-31 | Stop reason: HOSPADM

## 2020-01-31 RX ORDER — CEPHALEXIN 500 MG/1
500 CAPSULE ORAL 3 TIMES DAILY
Qty: 6 CAPSULE | Refills: 0 | Status: ON HOLD | OUTPATIENT
Start: 2020-01-31 | End: 2020-02-12

## 2020-01-31 RX ORDER — SODIUM CHLORIDE, SODIUM LACTATE, POTASSIUM CHLORIDE, CALCIUM CHLORIDE 600; 310; 30; 20 MG/100ML; MG/100ML; MG/100ML; MG/100ML
INJECTION, SOLUTION INTRAVENOUS CONTINUOUS PRN
Status: DISCONTINUED | OUTPATIENT
Start: 2020-01-31 | End: 2020-01-31

## 2020-01-31 RX ORDER — CEFAZOLIN SODIUM 1 G/3ML
1 INJECTION, POWDER, FOR SOLUTION INTRAMUSCULAR; INTRAVENOUS SEE ADMIN INSTRUCTIONS
Status: DISCONTINUED | OUTPATIENT
Start: 2020-01-31 | End: 2020-01-31 | Stop reason: HOSPADM

## 2020-01-31 RX ORDER — PROPOFOL 10 MG/ML
INJECTION, EMULSION INTRAVENOUS CONTINUOUS PRN
Status: DISCONTINUED | OUTPATIENT
Start: 2020-01-31 | End: 2020-01-31

## 2020-01-31 RX ORDER — MULTIVIT WITH MINERALS/LUTEIN
250 TABLET ORAL DAILY
COMMUNITY
End: 2021-03-23

## 2020-01-31 RX ORDER — FENTANYL CITRATE 50 UG/ML
INJECTION, SOLUTION INTRAMUSCULAR; INTRAVENOUS PRN
Status: DISCONTINUED | OUTPATIENT
Start: 2020-01-31 | End: 2020-01-31

## 2020-01-31 RX ORDER — DEXAMETHASONE SODIUM PHOSPHATE 4 MG/ML
INJECTION, SOLUTION INTRA-ARTICULAR; INTRALESIONAL; INTRAMUSCULAR; INTRAVENOUS; SOFT TISSUE PRN
Status: DISCONTINUED | OUTPATIENT
Start: 2020-01-31 | End: 2020-01-31

## 2020-01-31 RX ORDER — SODIUM CHLORIDE, SODIUM LACTATE, POTASSIUM CHLORIDE, CALCIUM CHLORIDE 600; 310; 30; 20 MG/100ML; MG/100ML; MG/100ML; MG/100ML
INJECTION, SOLUTION INTRAVENOUS CONTINUOUS
Status: DISCONTINUED | OUTPATIENT
Start: 2020-01-31 | End: 2020-01-31 | Stop reason: HOSPADM

## 2020-01-31 RX ORDER — PROCHLORPERAZINE MALEATE 5 MG
5 TABLET ORAL EVERY 6 HOURS PRN
Status: DISCONTINUED | OUTPATIENT
Start: 2020-01-31 | End: 2020-01-31 | Stop reason: HOSPADM

## 2020-01-31 RX ORDER — HYDROMORPHONE HYDROCHLORIDE 1 MG/ML
.3-.5 INJECTION, SOLUTION INTRAMUSCULAR; INTRAVENOUS; SUBCUTANEOUS EVERY 5 MIN PRN
Status: DISCONTINUED | OUTPATIENT
Start: 2020-01-31 | End: 2020-01-31 | Stop reason: HOSPADM

## 2020-01-31 RX ORDER — ONDANSETRON 4 MG/1
4 TABLET, ORALLY DISINTEGRATING ORAL EVERY 6 HOURS PRN
Qty: 20 TABLET | Refills: 0 | Status: SHIPPED | OUTPATIENT
Start: 2020-01-31 | End: 2020-06-12

## 2020-01-31 RX ORDER — LIDOCAINE 40 MG/G
CREAM TOPICAL
Status: DISCONTINUED | OUTPATIENT
Start: 2020-01-31 | End: 2020-01-31 | Stop reason: HOSPADM

## 2020-01-31 RX ORDER — KETOROLAC TROMETHAMINE 15 MG/ML
15 INJECTION, SOLUTION INTRAMUSCULAR; INTRAVENOUS EVERY 6 HOURS
Status: DISCONTINUED | OUTPATIENT
Start: 2020-01-31 | End: 2020-01-31 | Stop reason: HOSPADM

## 2020-01-31 RX ADMIN — CEFAZOLIN 1 G: 1 INJECTION, POWDER, FOR SOLUTION INTRAMUSCULAR; INTRAVENOUS at 10:39

## 2020-01-31 RX ADMIN — FENTANYL CITRATE 25 MCG: 50 INJECTION, SOLUTION INTRAMUSCULAR; INTRAVENOUS at 10:57

## 2020-01-31 RX ADMIN — DEXMEDETOMIDINE HYDROCHLORIDE 4 MCG: 100 INJECTION, SOLUTION INTRAVENOUS at 11:13

## 2020-01-31 RX ADMIN — Medication 10 MG: at 10:38

## 2020-01-31 RX ADMIN — Medication 10 MG: at 11:50

## 2020-01-31 RX ADMIN — PHENYLEPHRINE HYDROCHLORIDE 200 MCG: 10 INJECTION INTRAVENOUS at 12:11

## 2020-01-31 RX ADMIN — DEXAMETHASONE SODIUM PHOSPHATE 4 MG: 4 INJECTION, SOLUTION INTRA-ARTICULAR; INTRALESIONAL; INTRAMUSCULAR; INTRAVENOUS; SOFT TISSUE at 10:41

## 2020-01-31 RX ADMIN — HYDROMORPHONE HYDROCHLORIDE 2 MG: 2 TABLET ORAL at 14:21

## 2020-01-31 RX ADMIN — Medication 10 MG: at 10:34

## 2020-01-31 RX ADMIN — Medication 5 MG: at 11:02

## 2020-01-31 RX ADMIN — PHENYLEPHRINE HYDROCHLORIDE 100 MCG: 10 INJECTION INTRAVENOUS at 10:34

## 2020-01-31 RX ADMIN — Medication 5 MG: at 11:34

## 2020-01-31 RX ADMIN — ONDANSETRON 4 MG: 2 INJECTION INTRAMUSCULAR; INTRAVENOUS at 12:22

## 2020-01-31 RX ADMIN — Medication 5 MG: at 10:51

## 2020-01-31 RX ADMIN — SODIUM CHLORIDE, POTASSIUM CHLORIDE, SODIUM LACTATE AND CALCIUM CHLORIDE: 600; 310; 30; 20 INJECTION, SOLUTION INTRAVENOUS at 10:27

## 2020-01-31 RX ADMIN — FENTANYL CITRATE 25 MCG: 50 INJECTION, SOLUTION INTRAMUSCULAR; INTRAVENOUS at 12:28

## 2020-01-31 RX ADMIN — FENTANYL CITRATE 25 MCG: 50 INJECTION, SOLUTION INTRAMUSCULAR; INTRAVENOUS at 10:29

## 2020-01-31 RX ADMIN — SODIUM CHLORIDE, POTASSIUM CHLORIDE, SODIUM LACTATE AND CALCIUM CHLORIDE: 600; 310; 30; 20 INJECTION, SOLUTION INTRAVENOUS at 12:20

## 2020-01-31 RX ADMIN — FENTANYL CITRATE 50 MCG: 0.05 INJECTION, SOLUTION INTRAMUSCULAR; INTRAVENOUS at 12:43

## 2020-01-31 RX ADMIN — MIDAZOLAM 1 MG: 1 INJECTION INTRAMUSCULAR; INTRAVENOUS at 10:29

## 2020-01-31 RX ADMIN — DEXMEDETOMIDINE HYDROCHLORIDE 8 MCG: 100 INJECTION, SOLUTION INTRAVENOUS at 10:31

## 2020-01-31 RX ADMIN — PHENYLEPHRINE HYDROCHLORIDE 100 MCG: 10 INJECTION INTRAVENOUS at 10:38

## 2020-01-31 RX ADMIN — DEXMEDETOMIDINE HYDROCHLORIDE 8 MCG: 100 INJECTION, SOLUTION INTRAVENOUS at 12:20

## 2020-01-31 RX ADMIN — Medication 10 MG: at 12:11

## 2020-01-31 RX ADMIN — PHENYLEPHRINE HYDROCHLORIDE 100 MCG: 10 INJECTION INTRAVENOUS at 10:51

## 2020-01-31 RX ADMIN — PHENYLEPHRINE HYDROCHLORIDE 100 MCG: 10 INJECTION INTRAVENOUS at 11:02

## 2020-01-31 RX ADMIN — PROPOFOL 150 MCG/KG/MIN: 10 INJECTION, EMULSION INTRAVENOUS at 10:29

## 2020-01-31 RX ADMIN — Medication 5 MG: at 11:40

## 2020-01-31 RX ADMIN — FENTANYL CITRATE 25 MCG: 50 INJECTION, SOLUTION INTRAMUSCULAR; INTRAVENOUS at 11:12

## 2020-01-31 RX ADMIN — PROPOFOL 150 MG: 10 INJECTION, EMULSION INTRAVENOUS at 10:29

## 2020-01-31 ASSESSMENT — MIFFLIN-ST. JEOR: SCORE: 1268.79

## 2020-01-31 NOTE — ANESTHESIA POSTPROCEDURE EVALUATION
Patient: Kavitha Headley    Procedure(s):  RIGHT FIRST METATARSAL PHALANGEAL JOINT ARTHRODESIS  WITH SECOND AND THIRD CLAW TOE RECONSTRUCTION    Diagnosis:Acquired hallux valgus of right foot [M20.11]  Diagnosis Additional Information: No value filed.    Anesthesia Type:  general  Note:  Anesthesia Post Evaluation    Patient location during evaluation: PACU  Patient participation: Able to fully participate in evaluation  Level of consciousness: awake and alert  Pain management: adequate  Airway patency: patent  Cardiovascular status: acceptable  Respiratory status: acceptable  Hydration status: acceptable  PONV: none and controlled     Anesthetic complications: None          Last vitals:  Vitals:    01/31/20 1315 01/31/20 1330 01/31/20 1427   BP: 105/74 109/57 103/69   Pulse: 82 70 79   Resp: 18 19 16   Temp: 36.9  C (98.4  F)     SpO2: 94% 96% 93%         Electronically Signed By: Klever Dewitt MD  January 31, 2020  2:51 PM

## 2020-01-31 NOTE — ANESTHESIA POSTPROCEDURE EVALUATION
Patient: Kavitha Headley    Procedure(s):  RIGHT FIRST METATARSAL PHALANGEAL JOINT ARTHRODESIS  WITH SECOND AND THIRD CLAW TOE RECONSTRUCTION    Diagnosis:Acquired hallux valgus of right foot [M20.11]  Diagnosis Additional Information: No value filed.    Anesthesia Type:  No value filed.    Note:  Anesthesia Post Evaluation    Last vitals:  Vitals:    01/31/20 1315 01/31/20 1330 01/31/20 1427   BP: 105/74 109/57 103/69   Pulse: 82 70 79   Resp: 18 19 16   Temp: 36.9  C (98.4  F)     SpO2: 94% 96% 93%         Electronically Signed By: Klever Dewitt MD  January 31, 2020  2:49 PM

## 2020-01-31 NOTE — DISCHARGE INSTRUCTIONS
"Post-Operative Instructions Foot Surgery Patients  Steven Reyes M.D.    Board Certified  Fellowship, Foot and Ankle Surgery  Member, American Academy of Orthopaedic Surgeons  American Orthopaedic Foot and Ankle Society    Direct Phone 9:00am to 5:00pm [phone]  After Hours/24 Hour On Call [phone]      Diet:  ? Take all prescribed medications with food   ? Narcotic pain medication can cause constipation  ? Avoid alcoholic beverages while taking pain medications   ? Increase dietary fiber, protein rich foods, fluids post operatively    Activity:  ? Elevate operative foot 90% of the time.  ? \"Swelling runs downhill\" - the more you elevate, the less permanent swelling you will experience  ? Post Op shoe/sandal must be on at all times with weight bearing  ? Unless told otherwise, you may put full weight on your foot  ? Walk with a flat foot  ? Use crutches/walker/cane as needed for balance and comfort  ? Do NOT walk on your heel, this pulls against possible pins/screws in your toes  ? You may be up to the bathroom, to the kitchen for meals and to change locations in the house.  ? You may do sit-ups (NOT BONE GRAFT PATIENTS), leg raises, upper body exercises  ? Every time you see a commercial on TV, change a web page or book page, perform ANKLE PUMPS  ? ANKLE PUMPS helps prevent a blood clot, pump swelling back to you heart  ? Adjust your immobilized foot/ankle to relieve pressure on your heel  ? Do not try to wiggle your toes    Special Care Instructions:     ? DO NOT CHANGE OR ALTER THE DRESSING  ? Keep your dressing(s) dry;    ? Sponge bath or wrap seal your foot (with shoe on) for shower  ? It is normal to have some incisional drainage  ? It is not unusual to have some \"numbness\" in foot and ankle following surgery  ? Smoking should be avoided.  It will interfere in your healing.  ? Check pins daily:  ? Do not push pin in if it comes out  ? Do not pull pin out if it goes in          Report to your " Doctor if any of the following occur:  ? Elevated temperature, 101o or higher  ? Increased pain unrelieved by pain medication and/or elevation  ? Tight/loose/wet dressing  ? Increased swelling  ? Calf pain  ? Pin drainage  ? Pin migration    Pin out over 1  from tip of toe to tip of white cap    White cap comes off    White cap is pushing on tip of toe (no metal showing)  ? For any shortness of breath, DIAL 911, go to the Emergency Room      Medications:  ? Take all of the following medications with food.    ? Aspirin/Ecotrin 325 mg.:  1 tab 1x/day  ? This is for blood clot prevention  ? If you have sensitive stomach, Ecotrin can be less irritating  ? If you experience any unusual bruising or bleeding or ringing in the ears, stop this medication and call us  ? Oxycodone  1-2 every 3-4 hours as needed for pain  ? You may take 1 tablet with plain Tylenol or Ibuprofen for less severe pain or to decrease nausea/mental effect  ?  Hydrocodone  1-2 every 3-4 hours as needed for pain  ? You may take 1 tablet with plain Tylenol or Ibuprofen for less severe pain or to decrease nausea/mental effects  ? Ibuprofen 1 every 6 hours as needed for inflammatory pain        Your Next Appointment:    ? Appt. scheduled for ___Wed 2/12/2020____2:15 pm________________________        Direct Phone 9:00am to 5:00pm [phone]    After Hours/24 Hour On Call [phone]      Same Day Surgery Discharge Instructions for  Sedation and General Anesthesia       It's not unusual to feel dizzy, light-headed or faint for up to 24 hours after surgery or while taking pain medication.  If you have these symptoms: sit for a few minutes before standing and have someone assist you when you get up to walk or use the bathroom.      You should rest and relax for the next 24 hours. We recommend you make arrangements to have an adult stay with you for at least 24 hours after your discharge.  Avoid hazardous and strenuous activity.      DO NOT DRIVE any  vehicle or operate mechanical equipment for 24 hours following the end of your surgery.  Even though you may feel normal, your reactions may be affected by the medication you have received.      Do not drink alcoholic beverages for 24 hours following surgery.       Slowly progress to your regular diet as you feel able. It's not unusual to feel nauseated and/or vomit after receiving anesthesia.  If you develop these symptoms, drink clear liquids (apple juice, ginger ale, broth, 7-up, etc. ) until you feel better.  If your nausea and vomiting persists for 24 hours, please notify your surgeon.        All narcotic pain medications, along with inactivity and anesthesia, can cause constipation. Drinking plenty of liquids and increasing fiber intake will help.      For any questions of a medical nature, call your surgeon.      Do not make important decisions for 24 hours.      If you had general anesthesia, you may have a sore throat for a couple of days related to the breathing tube used during surgery.  You may use Cepacol lozenges to help with this discomfort.  If it worsens or if you develop a fever, contact your surgeon.       If you feel your pain is not well managed with the pain medications prescribed by your surgeon, please contact your surgeon's office to let them know so they can address your concerns.

## 2020-01-31 NOTE — ANESTHESIA PREPROCEDURE EVALUATION
Anesthesia Evaluation       history and physical reviewed . Pt has had prior anesthetic.     History of anesthetic complications  - PONV and PONV    ROS/MED HX    Pulmonary:       Neurologic:       Cardiovascular: Comment: Perioperative beta blockers    (+) hypertension--CAD, --. : . . . :. .     METS/Exercise Tolerance:     Hematologic:     (+) Anemia, -    Musculoskeletal:       GI/Hepatic: Comment: ETOH    (+) GERD Asymptomatic on medication, liver disease,     Renal:     (+) chronic renal disease, type: CRI, Pt does not require dialysis, Pt has no history of transplant,     Endo:         Psychiatric:     (+) psychiatric history (H/O alcohol abuse) Depression    Infectious Disease:       Other:     (+) H/O Chronic Pain,         Physical Exam      Airway   Mallampati: II  Neck ROM: full    Dental     Cardiovascular   Rhythm and rate: regular and normal      Pulmonary    breath sounds clear to auscultation                    Anesthesia Plan      History & Physical Review      ASA Status:  2 .        Plan for General with Intravenous and Propofol induction. Maintenance will be TIVA.      ZOFRAN and DECADRON       Postoperative Care      Consents  Anesthetic plan, risks, benefits and alternatives discussed with:  Patient or representative..            .  DPreop diagnosis: HEMATOMA  Procedure(s):  REVISE RECONSTRUCTED BREAST  Allergies   Allergen Reactions     Bactrim [Sulfamethoxazole W/Trimethoprim] Hives     Codeine Itching     NAUSEA     Morphine Itching     NAUSEA       ceFAZolin (ANCEF) 1 g vial to attach to  ml bag for ADULT or 50 ml bag for PEDS    aspirin (ASA) 81 MG tablet, Take 81 mg by mouth daily   aspirin-acetaminophen-caffeine (EXCEDRIN MIGRAINE) 250-250-65 MG tablet, Take 2 tablets by mouth daily as needed for headaches  atenolol (TENORMIN) 25 MG tablet, Take 1 tablet (25 mg) by mouth every morning  baclofen (LIORESAL) 10 MG tablet, Take 1 tablet (10 mg) by mouth 2 times daily  BIOTIN PO, Take  1 tablet by mouth every morning  buPROPion (WELLBUTRIN XL) 150 MG 24 hr tablet, Take 1 tablet (150 mg) by mouth every morning  citalopram (CELEXA) 20 MG tablet, Take 1 tablet (20 mg) by mouth daily  FOLIC ACID PO, Take 1 tablet by mouth daily  furosemide (LASIX) 20 MG tablet, Take 1 tablet (20 mg) by mouth daily  gabapentin (NEURONTIN) 300 MG capsule, TAKE 1 CAPSULE BY MOUTH THREE TIMES A DAY  HUMIRA PEN-PS/UV/ADOL HS START 40 MG/0.8ML pen kit, Inject 0.8 mLs (40 mg) Subcutaneous every 14 days  hydrocortisone (CORTAID) 1 % external ointment, Apply topically 3 times daily for 5 days To corners of mouth  hydrOXYzine (ATARAX) 25 MG tablet, Take 25 mg by mouth every morning TAKES IN ADDITION TO EVENING DOSE.  hydrOXYzine (ATARAX) 25 MG tablet, Take 50 mg by mouth At Bedtime (25MG X 2 = 50MG)  TAKES IN ADDITION TO MORNING DOSE.  Multiple Vitamins-Minerals (CENTRUM SILVER) per tablet, Take 1 tablet by mouth daily  mupirocin (BACTROBAN) 2 % external ointment, Apply topically 3 times daily for 5 days TO CORNERS OF MOUTH  nystatin (MYCOSTATIN) 932880 UNIT/GM external ointment, Apply topically 3 times daily for 5 days To corners of mouth  polyethylene glycol (MIRALAX/GLYCOLAX) Packet, Take 17 g by mouth daily as needed (constipation)  senna-docusate (SENOKOT-S;PERICOLACE) 8.6-50 MG per tablet, Take 1-2 tablets by mouth 2 times daily as needed for constipation  STATIN NOT PRESCRIBED, INTENTIONAL,, Please choose reason not prescribed, below  traZODone (DESYREL) 100 MG tablet, TAKE 1 TABLET (100 MG) BY MOUTH NIGHTLY AS NEEDED FOR SLEEP  TURMERIC PO, Take 1 capsule by mouth every morning  valACYclovir (VALTREX) 500 MG tablet, Take 1 tablet (500 mg) by mouth 3 times daily for 5 days  vitamin C (ASCORBIC ACID) 250 MG tablet, Take 250 mg by mouth daily        Hemoglobin   Date Value Range Status   6/5/2012 12.2  11.7-15.7 (g/dL) Final      INR   Date Value Range Status   4/11/2012 0.93  0.86-1.14 (no units) Final      Potassium    Date Value Range Status   6/5/2012 3.7  3.4-5.3 (mmol/L) Final        Anesthesia Evaluation       history and physical reviewed . Pt has had prior anesthetic.     History of anesthetic complications   - PONV and PONV        ROS/MED HX    ENT/Pulmonary:       Neurologic:       Cardiovascular: Comment: Perioperative beta blockers    (+) hypertension--CAD, --. : . . . :. .       METS/Exercise Tolerance:     Hematologic:     (+) Anemia, -      Musculoskeletal:         GI/Hepatic: Comment: ETOH    (+) GERD Asymptomatic on medication, liver disease,       Renal/Genitourinary:     (+) chronic renal disease, type: CRI, Pt does not require dialysis, Pt has no history of transplant,       Endo:         Psychiatric:     (+) psychiatric history (H/O alcohol abuse) Depression      Infectious Disease:         Malignancy:         Other:    (+) H/O Chronic Pain,                   Anesthesia Plan      History & Physical Review      ASA Status:  2 .        Plan for General with Intravenous and Propofol induction. Maintenance will be TIVA.      ZOFRAN and DECADRON       Postoperative Care      Consents  Anesthetic plan, risks, benefits and alternatives discussed with:  Patient or representative..

## 2020-01-31 NOTE — OP NOTE
Procedure Date: 01/31/2020      PREOPERATIVE DIAGNOSES:   1.  Failed podiatric surgery.   2.  Fixed arthritic right hallux varus.   3.  Dislocated fixed lesser toe clawing 2, 3, right foot.      POSTOPERATIVE DIAGNOSES:     1.  Failed podiatric surgery.   2.  Fixed arthritic right hallux varus.   3.  Dislocated fixed lesser toe clawing 2, 3, right foot.      PROCEDURES:   1.  DuVries resection arthroplasty, right second and third toe proximal interphalangeal joint.   2.  Extensor lengthening tenotomy at metatarsophalangeal joint with metatarsophalangeal capsulotomy and collateral ligament release.   3.  Tima metatarsal head resection arthroplasty, right second and third.   4.  Salvage right first metatarsophalangeal joint arthrodesis.      DESCRIPTION OF PROCEDURE:  After adequate general anesthetic was obtained, the patient's right foot and ankle was prepped and draped in the usual sterile fashion.  Thigh tourniquet was utilized.  Limb was exsanguinated, tourniquet raised.  We turned our attention first to the dislocated fixed second and third claw toe.  Transverse elliptical skin incision was made over the dorsal aspect of the second toe PIP joint.  It was carried down through the subcutaneous tissue and ellipse of skin and extensor tendon removed.  Collateral ligaments were released.  Distal aspect of the proximal phalanx was brought up through the wound and resected utilizing the Micro-Aire microsagittal saw.  This provided good decompression of the fixed flexion deformity.  We then turned our attention to the MP dislocation.  Dorsal longitudinal skin incision was made in the second webspace, carried down through the subcutaneous tissue.  Extensor tendons identified and tenotomized in lengthening fashion.  MP capsulotomy was performed.  McGlamry metatarsal head retractor was placed, releasing the collateral ligaments.  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  We elected to proceed  with a distal Tima metatarsal head resection arthroplasty.  This provided good decompression of the intractable plantar keratosis and allowed realignment of the second toe on the corresponding ray.  In identical fashion, the same soft tissue releases and metatarsal head resection arthroplasty was performed at the third toe MP joint.  Wounds were then thoroughly irrigated with antibiotic solution.  Intramedullary K-wire was driven antegrade and then retrograde across the interphalangeal joints into the corresponding metatarsal.  Extensor tendons and collateral ligaments were repaired at both the MP and PIP joints with 2-0 Vicryl.  Subcutaneous tissue was closed with 2-0 Vicryl, 3-0 Prolene vertical mattress stitch for the skin.  Marcaine 0.25% was instilled along the incision lines.      We then turned our attention to the significantly arthritic hallux varus deformity.  Straight medial longitudinal skin incision was made over the first MTP joint, carried down through the subcutaneous tissue, taking care to protect the sensory nerves.  Capsule was incised in line with the skin incision.  Subperiosteal stripped off of the medial and dorsal aspect of the joint.  We utilized the Virtru truncated reamer system to prepare the surfaces for arthrodesis.  Alignment pin was placed in the proximal phalanx.  Truncated reamer #1 and 2 were utilized to prepare the cup.  Excess bone was removed with a rongeur.  We then turned our attention to the first metatarsal where the alignment guide was set at 5 degrees of valgus, 15 degrees of dorsiflexion.  Alignment pin was placed.  Truncated reamer #3 was utilized to prepare the cone.  Excess bone was removed with a rongeur.  Cup and cone were then found to coapt quite nicely.  With the toe held in the correct rotation, a guide pin from the Ace 4-0 screw set placed obliquely proximal to distal.  A 32 mm partially-threaded cancellous lag screw from the Biomet 4.0 set was chosen,  countersunk and secured with excellent fixation.  Remaining osteophytes were removed.  Wound thoroughly irrigated with antibiotic solution.  Capsule closed with interrupted 2-0 Vicryl, skin closed with 3-0 Prolene vertical mattress stitch.  Marcaine 0.25% was instilled along the incision line.  Sterile Jin dressing was applied.  Tourniquet was released after 50 minutes and with good capillary refill.  The patient was taken to the recovery room in good condition.         ANDRÉS WONG MD             D: 2020   T: 2020   MT: WT      Name:     DARWIN JASSO   MRN:      [id number]        Account:        [id number]   :      1945           Procedure Date: 2020      Document: [id number]       cc: Andrés Wong MD

## 2020-01-31 NOTE — PROGRESS NOTES
Medication History Completed by Medication Scribe  Admission medication history interview status for the 1/31/2020  admission is complete. See EPIC admission navigator for prior to admission medications     Medication history sources: Patient, Surescripts, H&P and Care Everywhere  Medication history source reliability: Moderate  Adherence assessment: N/A Not Observed    Significant changes made to the medication list:  None      Additional medication history information:   None    Medication reconciliation completed by provider prior to medication history? No    Time spent in this activity: 25 MINUTES      Prior to Admission medications    Medication Sig Last Dose Taking? Auth Provider   aspirin (ASA) 81 MG tablet Take 81 mg by mouth daily  MORE THAN A WEEK at AM Yes Nayeli Castorena PA-C   aspirin-acetaminophen-caffeine (EXCEDRIN MIGRAINE) 250-250-65 MG tablet Take 2 tablets by mouth daily as needed for headaches 1/27/2020 at AM Yes Reported, Patient   atenolol (TENORMIN) 25 MG tablet Take 1 tablet (25 mg) by mouth every morning 1/31/2020 at 0500 Yes Nayeli Castorena PA-C   baclofen (LIORESAL) 10 MG tablet Take 1 tablet (10 mg) by mouth 2 times daily 1/30/2020 at PM Yes Nayeli Castorena PA-C   BIOTIN PO Take 1 tablet by mouth every morning 1/30/2020 at AM Yes Reported, Patient   buPROPion (WELLBUTRIN XL) 150 MG 24 hr tablet Take 1 tablet (150 mg) by mouth every morning 1/31/2020 at 0500 Yes Nayeli Castorena PA-C   citalopram (CELEXA) 20 MG tablet Take 1 tablet (20 mg) by mouth daily 1/31/2020 at 0500 Yes Nayeli Castorena PA-C   FOLIC ACID PO Take 1 tablet by mouth daily 1/30/2020 at AM Yes Reported, Patient   furosemide (LASIX) 20 MG tablet Take 1 tablet (20 mg) by mouth daily 1/28/2020 at PRN Yes Nayeli Castorena PA-C   gabapentin (NEURONTIN) 300 MG capsule TAKE 1 CAPSULE BY MOUTH THREE TIMES A DAY 1/31/2020 at 0500 Yes Nayeli Castorena PA-C   HUMIRA PEN-PS/UV/ADOL HS START 40 MG/0.8ML  pen kit Inject 0.8 mLs (40 mg) Subcutaneous every 14 days MORE THAN 2 WEEKS Yes Nayeli Castorena PA-C   hydrocortisone (CORTAID) 1 % external ointment Apply topically 3 times daily for 5 days To corners of mouth 1/31/2020 at 0500 Yes Nayeli Castorena PA-C   hydrOXYzine (ATARAX) 25 MG tablet Take 25 mg by mouth every morning TAKES IN ADDITION TO EVENING DOSE. 1/30/2020 at AM Yes Reported, Patient   hydrOXYzine (ATARAX) 25 MG tablet Take 50 mg by mouth At Bedtime (25MG X 2 = 50MG)  TAKES IN ADDITION TO MORNING DOSE. 1/30/2020 at PM Yes Reported, Patient   Multiple Vitamins-Minerals (CENTRUM SILVER) per tablet Take 1 tablet by mouth daily 1/30/2020 at AM Yes Reported, Patient   mupirocin (BACTROBAN) 2 % external ointment Apply topically 3 times daily for 5 days TO CORNERS OF MOUTH 1/31/2020 at 0500 Yes Nayeli Castorena PA-C   nystatin (MYCOSTATIN) 283127 UNIT/GM external ointment Apply topically 3 times daily for 5 days To corners of mouth 1/31/2020 at 0500 Yes Nayeli Castorena PA-C   polyethylene glycol (MIRALAX/GLYCOLAX) Packet Take 17 g by mouth daily as needed (constipation) 1/29/2020 at PRN Yes Nan Macias,    senna-docusate (SENOKOT-S;PERICOLACE) 8.6-50 MG per tablet Take 1-2 tablets by mouth 2 times daily as needed for constipation 1/29/2020 at PRN Yes Nan Macias,    STATIN NOT PRESCRIBED, INTENTIONAL, Please choose reason not prescribed, below  Yes Nayeli Castorena PA-C   traZODone (DESYREL) 100 MG tablet TAKE 1 TABLET (100 MG) BY MOUTH NIGHTLY AS NEEDED FOR SLEEP 1/30/2020 at PM Yes Nayeli Castorena PA-C   TURMERIC PO Take 1 capsule by mouth every morning 1/30/2020 at AM Yes Reported, Patient   valACYclovir (VALTREX) 500 MG tablet Take 1 tablet (500 mg) by mouth 3 times daily for 5 days 1/31/2020 at 0500 Yes Nayeli Castorena PA-C   vitamin C (ASCORBIC ACID) 250 MG tablet Take 250 mg by mouth daily 1/30/2020 at AM Yes Reported, Patient

## 2020-01-31 NOTE — OR NURSING
Discharge to home.  Discharge instructions to pt and pt daughter.  No questions or concerns. Pt stated having pain a 6 on a scale from 1-10.  Pain pill given prior to discharge. Pt stated pain improving when leaving for home.  Prescriptions placed in pt belonging bag and given to pt daughter.

## 2020-01-31 NOTE — ANESTHESIA CARE TRANSFER NOTE
Patient: Kavitha Headley    Procedure(s):  RIGHT FIRST METATARSAL PHALANGEAL JOINT ARTHRODESIS  WITH SECOND AND THIRD CLAW TOE RECONSTRUCTION    Diagnosis: Acquired hallux valgus of right foot [M20.11]  Diagnosis Additional Information: No value filed.    Anesthesia Type:   No value filed.     Note:  Airway :Face Mask  Patient transferred to:PACU  Comments: Andrew Report: Identifed the Patient, Identified the Reponsible Provider, Reviewed the pertinent medical history, Discussed the surgical course, Reviewed Intra-OP anesthesia mangement and issues during anesthesia, Set expectations for post-procedure period and Allowed opportunity for questions and acknowledgement of understanding      Vitals: (Last set prior to Anesthesia Care Transfer)    CRNA VITALS  1/31/2020 1152 - 1/31/2020 1228      1/31/2020             Resp Rate (set):  10                Electronically Signed By: ARISTEO Murphy CRNA  January 31, 2020  12:28 PM

## 2020-02-04 RX ORDER — HYDROXYZINE HYDROCHLORIDE 25 MG/1
25 TABLET, FILM COATED ORAL EVERY MORNING
Status: CANCELLED | OUTPATIENT
Start: 2020-02-04

## 2020-02-04 NOTE — TELEPHONE ENCOUNTER
PA was approved for Hydroxyzine.  See 1/28/2020 PA encounter.    Patient notified of PA approval and advised to contact pharmacy for refill        EMILIA Conrad, RN, PHN  [facility]  Office: [phone]  Fax: [phone]

## 2020-02-04 NOTE — TELEPHONE ENCOUNTER
Prior Authorization Approval    Authorization Effective Date: 1/5/2020  Authorization Expiration Date: 2/3/2021  Medication: hydrOXYzine (ATARAX) 25 MG tablet - APPROVED  Reference #: [id number]   Insurance Company: [employer] - Phone [phone] Fax [phone]  Which Pharmacy is filling the prescription (Not needed for infusion/clinic administered): [facility]/PHARMACY #[id number] - [place], [place] - [address] AT [place] [address]  Pharmacy Notified: Yes  Patient Notified: Yes    Spoke to rep Mathews at [employer] to follow up on PA. Bisi stated that Case had been withdrawn as of yesterday due to lack of information and wrong phone and fax listed on request. I was able to do a verbal PA over the phone and get an immediate approval. New Case ID shown and effective dates show above.    Mckenzie Breaux PA Team

## 2020-02-04 NOTE — TELEPHONE ENCOUNTER
Reason for Call:  Medication or medication refill:    Do you use a [facility]?  Name of the pharmacy and phone number for the current request:    [facility]/PHARMACY #[id number] - [place], [place] - [address] [facility]. AT [facility] [address]  Name of the medication requested: hydrOXYzine (ATARAX) 25 MG tablet - #10 asap as she is out and is waiting for a prior auth.    Other request: Please send asap as patient is out  Can we leave a detailed message on this number? YES    Phone number patient can be reached at: Cell number on file:    Telephone Information:   Mobile [phone]       Best Time: today    Call taken on 2/4/2020 at 10:42 AM by Flory Donovan

## 2020-02-11 ENCOUNTER — TRANSFERRED RECORDS (OUTPATIENT)
Dept: HEALTH INFORMATION MANAGEMENT | Facility: CLINIC | Age: 75
End: 2020-02-11

## 2020-02-11 ENCOUNTER — HOSPITAL ENCOUNTER (INPATIENT)
Facility: CLINIC | Age: 75
LOS: 4 days | Discharge: HOME | DRG: 291 | End: 2020-02-15
Attending: INTERNAL MEDICINE | Admitting: INTERNAL MEDICINE
Payer: COMMERCIAL

## 2020-02-11 DIAGNOSIS — I50.9 ACUTE ON CHRONIC CONGESTIVE HEART FAILURE, UNSPECIFIED HEART FAILURE TYPE (H): ICD-10-CM

## 2020-02-11 DIAGNOSIS — M20.21 HALLUX RIGIDUS OF RIGHT FOOT: ICD-10-CM

## 2020-02-11 DIAGNOSIS — I35.0 NONRHEUMATIC AORTIC VALVE STENOSIS: Primary | ICD-10-CM

## 2020-02-11 LAB
ALT SERPL-CCNC: 17 U/L (ref 14–63)
AST SERPL-CCNC: 20 U/L (ref 15–37)
CREAT SERPL-MCNC: 0.86 MG/DL (ref 0.51–0.95)
GFR SERPL CREATININE-BSD FRML MDRD: >60 ML/MIN/1.73M2 (ref 60–150)
GLUCOSE SERPL-MCNC: 105 MG/DL (ref 74–100)
POTASSIUM SERPL-SCNC: 3.6 MMOL/L (ref 3.5–5.1)

## 2020-02-11 PROCEDURE — 99207 ZZC CDG-CODE CATEGORY CHANGED: CPT | Performed by: INTERNAL MEDICINE

## 2020-02-11 PROCEDURE — 21000001 ZZH R&B HEART CARE

## 2020-02-11 PROCEDURE — 99223 1ST HOSP IP/OBS HIGH 75: CPT | Performed by: INTERNAL MEDICINE

## 2020-02-11 RX ORDER — ASPIRIN 81 MG/1
81 TABLET ORAL DAILY
Status: DISCONTINUED | OUTPATIENT
Start: 2020-02-12 | End: 2020-02-15 | Stop reason: HOSPADM

## 2020-02-11 RX ORDER — GABAPENTIN 300 MG/1
300 CAPSULE ORAL 3 TIMES DAILY
Status: DISCONTINUED | OUTPATIENT
Start: 2020-02-12 | End: 2020-02-12

## 2020-02-11 RX ORDER — BISACODYL 10 MG
10 SUPPOSITORY, RECTAL RECTAL DAILY PRN
Status: DISCONTINUED | OUTPATIENT
Start: 2020-02-11 | End: 2020-02-15 | Stop reason: HOSPADM

## 2020-02-11 RX ORDER — LIDOCAINE 40 MG/G
CREAM TOPICAL
Status: DISCONTINUED | OUTPATIENT
Start: 2020-02-11 | End: 2020-02-15 | Stop reason: HOSPADM

## 2020-02-11 RX ORDER — ONDANSETRON 2 MG/ML
4 INJECTION INTRAMUSCULAR; INTRAVENOUS EVERY 6 HOURS PRN
Status: DISCONTINUED | OUTPATIENT
Start: 2020-02-11 | End: 2020-02-15 | Stop reason: HOSPADM

## 2020-02-11 RX ORDER — BUPROPION HYDROCHLORIDE 150 MG/1
150 TABLET ORAL EVERY MORNING
Status: DISCONTINUED | OUTPATIENT
Start: 2020-02-12 | End: 2020-02-15 | Stop reason: HOSPADM

## 2020-02-11 RX ORDER — LISINOPRIL 10 MG/1
10 TABLET ORAL DAILY
Status: DISCONTINUED | OUTPATIENT
Start: 2020-02-12 | End: 2020-02-14

## 2020-02-11 RX ORDER — HYDROMORPHONE HYDROCHLORIDE 2 MG/1
2 TABLET ORAL EVERY 4 HOURS PRN
Status: DISCONTINUED | OUTPATIENT
Start: 2020-02-11 | End: 2020-02-15 | Stop reason: HOSPADM

## 2020-02-11 RX ORDER — AMOXICILLIN 250 MG
2 CAPSULE ORAL 2 TIMES DAILY PRN
Status: DISCONTINUED | OUTPATIENT
Start: 2020-02-11 | End: 2020-02-15 | Stop reason: HOSPADM

## 2020-02-11 RX ORDER — POTASSIUM CHLORIDE 29.8 MG/ML
20 INJECTION INTRAVENOUS
Status: DISCONTINUED | OUTPATIENT
Start: 2020-02-11 | End: 2020-02-15 | Stop reason: HOSPADM

## 2020-02-11 RX ORDER — HYDRALAZINE HYDROCHLORIDE 20 MG/ML
10 INJECTION INTRAMUSCULAR; INTRAVENOUS EVERY 4 HOURS PRN
Status: DISCONTINUED | OUTPATIENT
Start: 2020-02-11 | End: 2020-02-15 | Stop reason: HOSPADM

## 2020-02-11 RX ORDER — FOLIC ACID 1 MG/1
1 TABLET ORAL DAILY
Status: DISCONTINUED | OUTPATIENT
Start: 2020-02-12 | End: 2020-02-15 | Stop reason: HOSPADM

## 2020-02-11 RX ORDER — AMOXICILLIN 250 MG
1 CAPSULE ORAL 2 TIMES DAILY PRN
Status: DISCONTINUED | OUTPATIENT
Start: 2020-02-11 | End: 2020-02-15 | Stop reason: HOSPADM

## 2020-02-11 RX ORDER — BACLOFEN 10 MG/1
10 TABLET ORAL 2 TIMES DAILY
Status: DISCONTINUED | OUTPATIENT
Start: 2020-02-12 | End: 2020-02-15 | Stop reason: HOSPADM

## 2020-02-11 RX ORDER — ATENOLOL 25 MG/1
25 TABLET ORAL EVERY MORNING
Status: DISCONTINUED | OUTPATIENT
Start: 2020-02-12 | End: 2020-02-15 | Stop reason: HOSPADM

## 2020-02-11 RX ORDER — CITALOPRAM HYDROBROMIDE 20 MG/1
20 TABLET ORAL DAILY
Status: DISCONTINUED | OUTPATIENT
Start: 2020-02-12 | End: 2020-02-15 | Stop reason: HOSPADM

## 2020-02-11 RX ORDER — POTASSIUM CHLORIDE 7.45 MG/ML
10 INJECTION INTRAVENOUS
Status: DISCONTINUED | OUTPATIENT
Start: 2020-02-11 | End: 2020-02-15 | Stop reason: HOSPADM

## 2020-02-11 RX ORDER — NALOXONE HYDROCHLORIDE 0.4 MG/ML
.1-.4 INJECTION, SOLUTION INTRAMUSCULAR; INTRAVENOUS; SUBCUTANEOUS
Status: DISCONTINUED | OUTPATIENT
Start: 2020-02-11 | End: 2020-02-15 | Stop reason: HOSPADM

## 2020-02-11 RX ORDER — TRAZODONE HYDROCHLORIDE 50 MG/1
100 TABLET, FILM COATED ORAL
Status: DISCONTINUED | OUTPATIENT
Start: 2020-02-11 | End: 2020-02-12

## 2020-02-11 RX ORDER — FUROSEMIDE 10 MG/ML
40 INJECTION INTRAMUSCULAR; INTRAVENOUS EVERY 12 HOURS
Status: DISCONTINUED | OUTPATIENT
Start: 2020-02-12 | End: 2020-02-12

## 2020-02-11 RX ORDER — POTASSIUM CHLORIDE 1500 MG/1
20-40 TABLET, EXTENDED RELEASE ORAL
Status: DISCONTINUED | OUTPATIENT
Start: 2020-02-11 | End: 2020-02-15 | Stop reason: HOSPADM

## 2020-02-11 RX ORDER — HYDROXYZINE HYDROCHLORIDE 25 MG/1
50 TABLET, FILM COATED ORAL AT BEDTIME
Status: DISCONTINUED | OUTPATIENT
Start: 2020-02-12 | End: 2020-02-15 | Stop reason: HOSPADM

## 2020-02-11 RX ORDER — POTASSIUM CHLORIDE 1.5 G/1.58G
20-40 POWDER, FOR SOLUTION ORAL
Status: DISCONTINUED | OUTPATIENT
Start: 2020-02-11 | End: 2020-02-15 | Stop reason: HOSPADM

## 2020-02-11 RX ORDER — ONDANSETRON 4 MG/1
4 TABLET, ORALLY DISINTEGRATING ORAL EVERY 6 HOURS PRN
Status: DISCONTINUED | OUTPATIENT
Start: 2020-02-11 | End: 2020-02-15 | Stop reason: HOSPADM

## 2020-02-11 RX ORDER — HYDROXYZINE HYDROCHLORIDE 25 MG/1
25 TABLET, FILM COATED ORAL EVERY MORNING
Status: DISCONTINUED | OUTPATIENT
Start: 2020-02-12 | End: 2020-02-15 | Stop reason: HOSPADM

## 2020-02-11 RX ORDER — MULTIPLE VITAMINS W/ MINERALS TAB 9MG-400MCG
1 TAB ORAL DAILY
Status: DISCONTINUED | OUTPATIENT
Start: 2020-02-12 | End: 2020-02-15 | Stop reason: HOSPADM

## 2020-02-11 RX ORDER — POTASSIUM CL/LIDO/0.9 % NACL 10MEQ/0.1L
10 INTRAVENOUS SOLUTION, PIGGYBACK (ML) INTRAVENOUS
Status: DISCONTINUED | OUTPATIENT
Start: 2020-02-11 | End: 2020-02-15 | Stop reason: HOSPADM

## 2020-02-11 ASSESSMENT — MIFFLIN-ST. JEOR: SCORE: 1199.39

## 2020-02-12 ENCOUNTER — APPOINTMENT (OUTPATIENT)
Dept: OCCUPATIONAL THERAPY | Facility: CLINIC | Age: 75
DRG: 291 | End: 2020-02-12
Attending: INTERNAL MEDICINE
Payer: COMMERCIAL

## 2020-02-12 ENCOUNTER — APPOINTMENT (OUTPATIENT)
Dept: GENERAL RADIOLOGY | Facility: CLINIC | Age: 75
DRG: 291 | End: 2020-02-12
Attending: INTERNAL MEDICINE
Payer: COMMERCIAL

## 2020-02-12 ENCOUNTER — APPOINTMENT (OUTPATIENT)
Dept: CARDIOLOGY | Facility: CLINIC | Age: 75
DRG: 291 | End: 2020-02-12
Attending: INTERNAL MEDICINE
Payer: COMMERCIAL

## 2020-02-12 LAB
ANION GAP SERPL CALCULATED.3IONS-SCNC: 5 MMOL/L (ref 3–14)
BUN SERPL-MCNC: 12 MG/DL (ref 7–30)
CALCIUM SERPL-MCNC: 9.6 MG/DL (ref 8.5–10.1)
CHLORIDE SERPL-SCNC: 98 MMOL/L (ref 94–109)
CHOLEST SERPL-MCNC: 317 MG/DL
CO2 SERPL-SCNC: 34 MMOL/L (ref 20–32)
CREAT SERPL-MCNC: 0.87 MG/DL (ref 0.52–1.04)
ERYTHROCYTE [DISTWIDTH] IN BLOOD BY AUTOMATED COUNT: 15.2 % (ref 10–15)
GFR SERPL CREATININE-BSD FRML MDRD: 65 ML/MIN/{1.73_M2}
GLUCOSE SERPL-MCNC: 93 MG/DL (ref 70–99)
HCT VFR BLD AUTO: 32.1 % (ref 35–47)
HDLC SERPL-MCNC: 81 MG/DL
HGB BLD-MCNC: 10.5 G/DL (ref 11.7–15.7)
LDLC SERPL CALC-MCNC: 218 MG/DL
MCH RBC QN AUTO: 30.9 PG (ref 26.5–33)
MCHC RBC AUTO-ENTMCNC: 32.7 G/DL (ref 31.5–36.5)
MCV RBC AUTO: 94 FL (ref 78–100)
NONHDLC SERPL-MCNC: 236 MG/DL
PLATELET # BLD AUTO: 322 10E9/L (ref 150–450)
POTASSIUM SERPL-SCNC: 3.6 MMOL/L (ref 3.4–5.3)
RBC # BLD AUTO: 3.4 10E12/L (ref 3.8–5.2)
SODIUM SERPL-SCNC: 137 MMOL/L (ref 133–144)
TRIGL SERPL-MCNC: 91 MG/DL
TROPONIN I SERPL-MCNC: <0.015 UG/L (ref 0–0.04)
TROPONIN I SERPL-MCNC: <0.015 UG/L (ref 0–0.04)
WBC # BLD AUTO: 7.1 10E9/L (ref 4–11)

## 2020-02-12 PROCEDURE — 25500064 ZZH RX 255 OP 636: Performed by: INTERNAL MEDICINE

## 2020-02-12 PROCEDURE — 93010 ELECTROCARDIOGRAM REPORT: CPT | Performed by: INTERNAL MEDICINE

## 2020-02-12 PROCEDURE — 25000132 ZZH RX MED GY IP 250 OP 250 PS 637: Performed by: INTERNAL MEDICINE

## 2020-02-12 PROCEDURE — 97165 OT EVAL LOW COMPLEX 30 MIN: CPT | Mod: GO

## 2020-02-12 PROCEDURE — 85027 COMPLETE CBC AUTOMATED: CPT | Performed by: INTERNAL MEDICINE

## 2020-02-12 PROCEDURE — 93005 ELECTROCARDIOGRAM TRACING: CPT

## 2020-02-12 PROCEDURE — 21000001 ZZH R&B HEART CARE

## 2020-02-12 PROCEDURE — 99207 ZZC CDG-MDM COMPONENT: MEETS MODERATE - UP CODED: CPT | Performed by: INTERNAL MEDICINE

## 2020-02-12 PROCEDURE — 93306 TTE W/DOPPLER COMPLETE: CPT

## 2020-02-12 PROCEDURE — 84484 ASSAY OF TROPONIN QUANT: CPT | Performed by: INTERNAL MEDICINE

## 2020-02-12 PROCEDURE — 25000128 H RX IP 250 OP 636: Performed by: INTERNAL MEDICINE

## 2020-02-12 PROCEDURE — 80061 LIPID PANEL: CPT | Performed by: INTERNAL MEDICINE

## 2020-02-12 PROCEDURE — 97535 SELF CARE MNGMENT TRAINING: CPT | Mod: GO

## 2020-02-12 PROCEDURE — 99222 1ST HOSP IP/OBS MODERATE 55: CPT | Mod: 25 | Performed by: INTERNAL MEDICINE

## 2020-02-12 PROCEDURE — 80048 BASIC METABOLIC PNL TOTAL CA: CPT | Performed by: INTERNAL MEDICINE

## 2020-02-12 PROCEDURE — 99233 SBSQ HOSP IP/OBS HIGH 50: CPT | Performed by: INTERNAL MEDICINE

## 2020-02-12 PROCEDURE — 36415 COLL VENOUS BLD VENIPUNCTURE: CPT | Performed by: INTERNAL MEDICINE

## 2020-02-12 PROCEDURE — 93306 TTE W/DOPPLER COMPLETE: CPT | Mod: 26 | Performed by: INTERNAL MEDICINE

## 2020-02-12 PROCEDURE — 71046 X-RAY EXAM CHEST 2 VIEWS: CPT

## 2020-02-12 RX ORDER — TRAZODONE HYDROCHLORIDE 100 MG/1
100 TABLET ORAL AT BEDTIME
Status: DISCONTINUED | OUTPATIENT
Start: 2020-02-12 | End: 2020-02-15 | Stop reason: HOSPADM

## 2020-02-12 RX ORDER — GABAPENTIN 300 MG/1
300 CAPSULE ORAL EVERY MORNING
Status: DISCONTINUED | OUTPATIENT
Start: 2020-02-13 | End: 2020-02-15 | Stop reason: HOSPADM

## 2020-02-12 RX ORDER — GABAPENTIN 600 MG/1
600 TABLET ORAL AT BEDTIME
Status: DISCONTINUED | OUTPATIENT
Start: 2020-02-12 | End: 2020-02-15 | Stop reason: HOSPADM

## 2020-02-12 RX ORDER — FUROSEMIDE 10 MG/ML
40 INJECTION INTRAMUSCULAR; INTRAVENOUS 2 TIMES DAILY
Status: DISCONTINUED | OUTPATIENT
Start: 2020-02-13 | End: 2020-02-13

## 2020-02-12 RX ORDER — GABAPENTIN 300 MG/1
300 CAPSULE ORAL EVERY MORNING
COMMUNITY
End: 2020-03-26

## 2020-02-12 RX ORDER — GABAPENTIN 300 MG/1
600 CAPSULE ORAL EVERY EVENING
COMMUNITY
End: 2020-06-12 | Stop reason: DRUGHIGH

## 2020-02-12 RX ORDER — POLYETHYLENE GLYCOL 3350 17 G/17G
17 POWDER, FOR SOLUTION ORAL EVERY EVENING
Status: DISCONTINUED | OUTPATIENT
Start: 2020-02-12 | End: 2020-02-15 | Stop reason: HOSPADM

## 2020-02-12 RX ORDER — FUROSEMIDE 10 MG/ML
40 INJECTION INTRAMUSCULAR; INTRAVENOUS ONCE
Status: COMPLETED | OUTPATIENT
Start: 2020-02-12 | End: 2020-02-12

## 2020-02-12 RX ORDER — TRAZODONE HYDROCHLORIDE 100 MG/1
100 TABLET ORAL AT BEDTIME
COMMUNITY
End: 2020-07-22

## 2020-02-12 RX ORDER — ACETAMINOPHEN 500 MG
1000 TABLET ORAL EVERY 6 HOURS PRN
Status: DISCONTINUED | OUTPATIENT
Start: 2020-02-12 | End: 2020-02-15 | Stop reason: HOSPADM

## 2020-02-12 RX ORDER — FOLIC ACID 1 MG/1
1 TABLET ORAL DAILY
COMMUNITY

## 2020-02-12 RX ORDER — ASPIRIN 81 MG/1
81 TABLET ORAL DAILY
COMMUNITY
End: 2023-01-01

## 2020-02-12 RX ADMIN — SENNOSIDES AND DOCUSATE SODIUM 2 TABLET: 8.6; 5 TABLET ORAL at 15:01

## 2020-02-12 RX ADMIN — GABAPENTIN 300 MG: 300 CAPSULE ORAL at 08:07

## 2020-02-12 RX ADMIN — HUMAN ALBUMIN MICROSPHERES AND PERFLUTREN 9 ML: 10; .22 INJECTION, SOLUTION INTRAVENOUS at 11:34

## 2020-02-12 RX ADMIN — BACLOFEN 10 MG: 10 TABLET ORAL at 08:07

## 2020-02-12 RX ADMIN — FUROSEMIDE 40 MG: 10 INJECTION, SOLUTION INTRAVENOUS at 00:14

## 2020-02-12 RX ADMIN — BUPROPION HYDROCHLORIDE 150 MG: 150 TABLET, FILM COATED, EXTENDED RELEASE ORAL at 08:07

## 2020-02-12 RX ADMIN — HYDROXYZINE HYDROCHLORIDE 50 MG: 25 TABLET, FILM COATED ORAL at 00:23

## 2020-02-12 RX ADMIN — LISINOPRIL 10 MG: 10 TABLET ORAL at 08:05

## 2020-02-12 RX ADMIN — HYDROXYZINE HYDROCHLORIDE 50 MG: 25 TABLET, FILM COATED ORAL at 20:44

## 2020-02-12 RX ADMIN — ATENOLOL 25 MG: 25 TABLET ORAL at 08:07

## 2020-02-12 RX ADMIN — FUROSEMIDE 40 MG: 10 INJECTION, SOLUTION INTRAVENOUS at 12:36

## 2020-02-12 RX ADMIN — HYDROMORPHONE HYDROCHLORIDE 2 MG: 2 TABLET ORAL at 05:37

## 2020-02-12 RX ADMIN — TRAZODONE HYDROCHLORIDE 100 MG: 50 TABLET ORAL at 00:14

## 2020-02-12 RX ADMIN — MULTIPLE VITAMINS W/ MINERALS TAB 1 TABLET: TAB at 08:05

## 2020-02-12 RX ADMIN — SENNOSIDES AND DOCUSATE SODIUM 2 TABLET: 8.6; 5 TABLET ORAL at 22:08

## 2020-02-12 RX ADMIN — HYDROXYZINE HYDROCHLORIDE 25 MG: 25 TABLET, FILM COATED ORAL at 08:06

## 2020-02-12 RX ADMIN — GABAPENTIN 600 MG: 600 TABLET, FILM COATED ORAL at 20:44

## 2020-02-12 RX ADMIN — CITALOPRAM HYDROBROMIDE 20 MG: 20 TABLET ORAL at 08:07

## 2020-02-12 RX ADMIN — HYDROMORPHONE HYDROCHLORIDE 2 MG: 2 TABLET ORAL at 00:23

## 2020-02-12 RX ADMIN — HYDROMORPHONE HYDROCHLORIDE 2 MG: 2 TABLET ORAL at 10:19

## 2020-02-12 RX ADMIN — FOLIC ACID 1 MG: 1 TABLET ORAL at 08:07

## 2020-02-12 RX ADMIN — GABAPENTIN 300 MG: 300 CAPSULE ORAL at 00:14

## 2020-02-12 RX ADMIN — FUROSEMIDE 40 MG: 10 INJECTION, SOLUTION INTRAVENOUS at 15:00

## 2020-02-12 RX ADMIN — HYDROMORPHONE HYDROCHLORIDE 2 MG: 2 TABLET ORAL at 15:00

## 2020-02-12 RX ADMIN — BACLOFEN 10 MG: 10 TABLET ORAL at 00:23

## 2020-02-12 RX ADMIN — HYDROMORPHONE HYDROCHLORIDE 2 MG: 2 TABLET ORAL at 20:44

## 2020-02-12 RX ADMIN — TRAZODONE HYDROCHLORIDE 100 MG: 100 TABLET ORAL at 20:45

## 2020-02-12 RX ADMIN — BACLOFEN 10 MG: 10 TABLET ORAL at 20:43

## 2020-02-12 RX ADMIN — ASPIRIN 81 MG: 81 TABLET, DELAYED RELEASE ORAL at 08:05

## 2020-02-12 ASSESSMENT — ACTIVITIES OF DAILY LIVING (ADL)
ADLS_ACUITY_SCORE: 15
ADLS_ACUITY_SCORE: 13
ADLS_ACUITY_SCORE: 16
ADLS_ACUITY_SCORE: 13
PREVIOUS_RESPONSIBILITIES: MEAL PREP;HOUSEKEEPING;LAUNDRY;SHOPPING;MEDICATION MANAGEMENT;FINANCES;DRIVING
ADLS_ACUITY_SCORE: 16
ADLS_ACUITY_SCORE: 13

## 2020-02-12 ASSESSMENT — MIFFLIN-ST. JEOR: SCORE: 1184.88

## 2020-02-12 NOTE — PLAN OF CARE
Heart Failure Care Pathway  GOALS TO BE MET BEFORE DISCHARGE:    1. Decrease congestion and/or edema with diuretic therapy to achieve near      optimal volume status.            Dyspnea improved:  Yes            Edema improved:     Yes        Net I/O and Weights since admission:          01/13 2300 - 02/12 2259  In: 200 [P.O.:200]  Out: 1600 [Urine:1600]  Net: -1400            Vitals:    02/11/20 2200 02/12/20 0314   Weight: 72.4 kg (159 lb 11.2 oz) 71 kg (156 lb 8 oz)       2.  O2 sats > 92% on RA or at prior home O2 therapy level.          Current oxygenation status:       SpO2: 95 %         O2 Device: None (Room air),  Oxygen Delivery: 1 LPM         Able to wean O2 this shift to keep sats > 92%:  Yes       Does patient use Home O2? No    3.  Tolerates ambulation and mobility near baseline: No, please explain: using walker s/p R foot reconstruction 1/31        How many times did the patient ambulate with nursing staff this shift? 4    Please review the Heart Failure Care Pathway for additional HF goal outcomes.    Natalia Villalba RN RN  2/12/2020

## 2020-02-12 NOTE — PHARMACY-ADMISSION MEDICATION HISTORY
Pharmacy Medication History  Admission medication history interview status for the 2/11/2020  admission is complete. See EPIC admission navigator for prior to admission medications     Medication history sources: Patient and Care Everywhere  Medication history source reliability: Good  Adherence assessment: Good    Significant changes made to the medication list:  Trazodone is HS scheduled, Gabapentin is 300mg Qam and 600mg Qpm; ASA is EC; Humira is on hold      Additional medication history information:       Medication reconciliation completed by provider prior to medication history? Yes    Time spent in this activity: 15 min      Prior to Admission medications    Medication Sig Last Dose Taking? Auth Provider   aspirin 81 MG EC tablet Take 81 mg by mouth daily 2/9/2020 at am Yes Unknown, Entered By History   aspirin-acetaminophen-caffeine (EXCEDRIN MIGRAINE) 250-250-65 MG tablet Take 2 tablets by mouth daily as needed for headaches Past Month at Unknown time Yes Reported, Patient   atenolol (TENORMIN) 25 MG tablet Take 1 tablet (25 mg) by mouth every morning 2/11/2020 at am Yes Nayeli Castorena PA-C   baclofen (LIORESAL) 10 MG tablet Take 1 tablet (10 mg) by mouth 2 times daily 2/11/2020 at pm Yes Nayeli Castorena PA-C   BIOTIN PO Take 1 tablet by mouth every morning 2/11/2020 at am Yes Reported, Patient   buPROPion (WELLBUTRIN XL) 150 MG 24 hr tablet Take 1 tablet (150 mg) by mouth every morning 2/11/2020 at am Yes Naylei Castorena PA-C   citalopram (CELEXA) 20 MG tablet Take 1 tablet (20 mg) by mouth daily 2/11/2020 at am Yes Nayeli Castorena PA-C   folic acid (FOLVITE) 1 MG tablet Take 1 mg by mouth daily 2/11/2020 at am Yes Unknown, Entered By History   gabapentin (NEURONTIN) 300 MG capsule Take 300 mg by mouth every morning 2/11/2020 at Unknown time Yes Unknown, Entered By History   gabapentin (NEURONTIN) 300 MG capsule Take 600 mg by mouth every evening 2/11/2020 at Unknown time Yes Unknown,  Entered By History   HUMIRA PEN-PS/UV/ADOL HS START 40 MG/0.8ML pen kit Inject 0.8 mLs (40 mg) Subcutaneous every 14 days  Patient taking differently: Inject 40 mg Subcutaneous every 14 days Humira is on hold for now per pt Past Month at Unknown time Yes Nayeli Castorena PA-C   HYDROmorphone (DILAUDID) 2 MG tablet Take 1 tablet (2 mg) by mouth every 4 hours as needed for moderate to severe pain 2/11/2020 at Unknown time Yes Steven Reyes MD   hydrOXYzine (ATARAX) 25 MG tablet Take 25 mg by mouth every morning TAKES IN ADDITION TO EVENING DOSE. 2/11/2020 at Unknown time Yes Reported, Patient   hydrOXYzine (ATARAX) 25 MG tablet Take 50 mg by mouth At Bedtime (25MG X 2 = 50MG)  TAKES IN ADDITION TO MORNING DOSE. 2/11/2020 at Unknown time Yes Reported, Patient   ibuprofen (ADVIL/MOTRIN) 600 MG tablet Take 1 tablet (600 mg) by mouth every 6 hours as needed for other (inflammatory pain) 2/11/2020 at Unknown time Yes Steven Reyes MD   polyethylene glycol (MIRALAX/GLYCOLAX) Packet Take 17 g by mouth daily as needed (constipation) Past Week at Unknown time Yes Nan Macias DO   senna-docusate (SENOKOT-S;PERICOLACE) 8.6-50 MG per tablet Take 1-2 tablets by mouth 2 times daily as needed for constipation Past Week at Unknown time Yes Nan Macias DO   traZODone (DESYREL) 100 MG tablet Take 100 mg by mouth At Bedtime 2/11/2020 at Unknown time Yes Unknown, Entered By History   TURMERIC PO Take 1 capsule by mouth every morning 2/11/2020 at Unknown time Yes Reported, Patient   vitamin C (ASCORBIC ACID) 250 MG tablet Take 250 mg by mouth daily 2/11/2020 at am Yes Reported, Patient   furosemide (LASIX) 20 MG tablet Take 1 tablet (20 mg) by mouth daily   Nayeli Castorena PA-C   Multiple Vitamins-Minerals (CENTRUM SILVER) per tablet Take 1 tablet by mouth daily   Reported, Patient   ondansetron (ZOFRAN-ODT) 4 MG ODT tab Take 1 tablet (4 mg) by mouth every 6 hours as needed for nausea or  vomiting 2/10/2020  Steven Reyes MD   STATIN NOT PRESCRIBED, INTENTIONAL, Please choose reason not prescribed, below   Nayeli Castroena PA-C

## 2020-02-12 NOTE — PLAN OF CARE
OT- Evaluation and treatment initiated. Pt lives in a two story home. Her significant other lives with her on Fridays, Saturdays, Sundays, Mondays. All needs met on the main level. Prior pt independent in all ADLs. Daughter and significant other have been assisting in IADLs since recent R foot surgery. Pt reported that she has no weight bearing restrictions after her recent R foot surgery. MD to  clarify weight bearing status.   Discharge Planner OT   Patient plan for discharge: Home with assist  Current status: Post-op boot on during session. Pt went from sit<>Stand with SBA. Pt ambulated to the bathroom with SBA and walker and transferred to/from the toilet with SBA. Pt required CGA for clothing management during toileting tasks.   Barriers to return to prior living situation: Decreased activity tolerance for I/ADLs   Recommendations for discharge: Home with assist for I/ADLs as needed (including home management tasks, dressing, bathing)  Rationale for recommendations: Anticipate pt will progress in established IP OT goal areas with continued therapy.        Entered by: Lyubov Martin 02/12/2020 4:47 PM

## 2020-02-12 NOTE — PLAN OF CARE
Discharge Planner PT   Patient plan for discharge: defer to OT   Current status: PT consult received. Chart reviewed and discussed with OT, pt currently mobilizing with SBA with FWW. Pt being seen by OT for CR while in the hosp. Will defer mobility to CR and nursing while in the hosp.   Barriers to return to prior living situation: defer OT.   Recommendations for discharge: defer OT.   Rationale for recommendations: No acute PT needs identified as pt currently SBA with FWW. Will sign off.        Entered by: Purvi Sanderson 02/12/2020 4:23 PM

## 2020-02-12 NOTE — CONSULTS
REASON FOR ASSESSMENT:  CHF Consult for 2 gm NA Diet Education    NUTRITION HISTORY:    Information obtained from patient:   Patient claims to be following the ketogenic diet at home and has been doing so for the past 6-8 months. Patient states that she and her significant other do not add salt to foods when cooking, except when seasoning meat.     Living situation:   Lives alone, but significant other is over 3-4 times per week.     Grocery shopping:  Patient and significant other split the responsibility of grocery shopping.     Meal preparation:  Patient and significant other both prepare meals.     Breakfast:  Scrambled eggs, avocado, arce     Lunch (usually a snack):   low-carb crackers with cheese     Dinner:   Protein source (fish, steak, pork, beef) with low carb vegetables.     Previous diet instructions:  Patient states she has not received low sodium diet education in the past.     CURRENT DIET:  2 gm NA, Low Saturated Fat     NUTRITION DIAGNOSIS:  Food- and nutrition-related knowledge deficit r/t lack of low sodium diet education AEB consuming foods high in sodium at each meal.    INTERVENTIONS:    Nutrition Prescription:  2 gm NA, Low Saturated Fat     Implementation:    Assessed learning needs, learning preferences, and willingness to learn    Nutrition Education (Content):  a) Provided handouts:  1) Tips for Low Na Diet  2) Label Reading  3) Low Na Foods/Drinks  4) Low Na Recipe Booklet  b) Discussed rational for limiting Na for CHF and stressed importance of following 2 gm Na guidelines   c) Encouraged patient to keep a daily food record    Nutrition Education (Application):  a) Discussed current eating habits and recommended alternative food choices    Anticipated good compliance    Diet Education - refer to Education Flowsheet    Goals:    Patient verbalizes understanding of diet by stating that she now realizes that many of the foods she currently eats are high in sodium.     All of the above  goals met during the education session.    Follow Up:    Provided RD contact information for future questions.    Recommend Out-Patient Nutrition Referral, if further diet instructions are needed.      Darline Hyman  Dietetic Intern

## 2020-02-12 NOTE — CONSULTS
Inpatient Cardiology Consultation   [facility]  Date of Admission:2/11/2020  Date of Consult: 2/12/2020    Inpatient cardiology consultation note has been dictated. Dictation number [id number]        Herman Ugarte MD [facility]  Cardiology          REVIEW OF SYSTEMS:  A comprehensive 10 point review of systems was completed and the pertinent positives are documented in history of present illness.    MEDICATIONS:  Prior to Admission Medications   Prescriptions Last Dose Informant Patient Reported? Taking?   BIOTIN PO 2/11/2020 at am Self Yes Yes   Sig: Take 1 tablet by mouth every morning   HUMIRA PEN-PS/UV/ADOL HS START 40 MG/0.8ML pen kit Past Month at Unknown time Self No Yes   Sig: Inject 0.8 mLs (40 mg) Subcutaneous every 14 days   Patient taking differently: Inject 40 mg Subcutaneous every 14 days Humira is on hold for now per pt   HYDROmorphone (DILAUDID) 2 MG tablet 2/11/2020 at Unknown time Self No Yes   Sig: Take 1 tablet (2 mg) by mouth every 4 hours as needed for moderate to severe pain   Multiple Vitamins-Minerals (CENTRUM SILVER) per tablet  Self Yes No   Sig: Take 1 tablet by mouth daily   STATIN NOT PRESCRIBED, INTENTIONAL,  Self No No   Sig: Please choose reason not prescribed, below   TURMERIC PO 2/11/2020 at Unknown time Self Yes Yes   Sig: Take 1 capsule by mouth every morning   aspirin 81 MG EC tablet 2/9/2020 at am Self Yes Yes   Sig: Take 81 mg by mouth daily   aspirin-acetaminophen-caffeine (EXCEDRIN MIGRAINE) 250-250-65 MG tablet Past Month at Unknown time Self Yes Yes   Sig: Take 2 tablets by mouth daily as needed for headaches   atenolol (TENORMIN) 25 MG tablet 2/11/2020 at am Self No Yes   Sig: Take 1 tablet (25 mg) by mouth every morning   baclofen (LIORESAL) 10 MG tablet 2/11/2020 at pm Self No Yes   Sig: Take 1 tablet (10 mg) by mouth 2 times daily   buPROPion (WELLBUTRIN XL) 150 MG 24 hr tablet 2/11/2020 at am Self No Yes   Sig: Take 1 tablet (150 mg) by mouth every  morning   citalopram (CELEXA) 20 MG tablet 2/11/2020 at am Self No Yes   Sig: Take 1 tablet (20 mg) by mouth daily   folic acid (FOLVITE) 1 MG tablet 2/11/2020 at am Self Yes Yes   Sig: Take 1 mg by mouth daily   furosemide (LASIX) 20 MG tablet 1/30/2020 Self No No   Sig: Take 1 tablet (20 mg) by mouth daily   gabapentin (NEURONTIN) 300 MG capsule 2/11/2020 at Unknown time Self Yes Yes   Sig: Take 300 mg by mouth every morning   gabapentin (NEURONTIN) 300 MG capsule 2/11/2020 at Unknown time Self Yes Yes   Sig: Take 600 mg by mouth every evening   hydrOXYzine (ATARAX) 25 MG tablet 2/11/2020 at Unknown time Self Yes Yes   Sig: Take 25 mg by mouth every morning TAKES IN ADDITION TO EVENING DOSE.   hydrOXYzine (ATARAX) 25 MG tablet 2/11/2020 at Unknown time Self Yes Yes   Sig: Take 50 mg by mouth At Bedtime (25MG X 2 = 50MG)  TAKES IN ADDITION TO MORNING DOSE.   ibuprofen (ADVIL/MOTRIN) 600 MG tablet 2/11/2020 at Unknown time Self No Yes   Sig: Take 1 tablet (600 mg) by mouth every 6 hours as needed for other (inflammatory pain)   ondansetron (ZOFRAN-ODT) 4 MG ODT tab 2/10/2020 Self No No   Sig: Take 1 tablet (4 mg) by mouth every 6 hours as needed for nausea or vomiting   polyethylene glycol (MIRALAX/GLYCOLAX) Packet Past Week at Unknown time Self No Yes   Sig: Take 17 g by mouth daily as needed (constipation)   senna-docusate (SENOKOT-S;PERICOLACE) 8.6-50 MG per tablet Past Week at Unknown time Self No Yes   Sig: Take 1-2 tablets by mouth 2 times daily as needed for constipation   traZODone (DESYREL) 100 MG tablet 2/11/2020 at Unknown time Self Yes Yes   Sig: Take 100 mg by mouth At Bedtime   vitamin C (ASCORBIC ACID) 250 MG tablet 2/11/2020 at am Self Yes Yes   Sig: Take 250 mg by mouth daily      Facility-Administered Medications: None       ALLERGIES:  Allergies   Allergen Reactions     Bactrim [Sulfamethoxazole W/Trimethoprim] Hives     Codeine Itching     NAUSEA     Morphine Itching     NAUSEA       PAST MEDICAL  HISTORY:  Past Medical History:   Diagnosis Date     Alcohol abuse     Long term alcohol abuse. Abstinenet since October 2019.     Anxiety disorder      Ascending aorta dilatation (H)      Bariatric surgery status 1996?    gastric bypass, [facility] and     Benign hypertension      Chronic insomnia      Chronic pain syndrome     Chronic back and neck pain, chronic pain due to osteoarthritis multiple joints     Coronary artery disease involving native coronary artery of native heart without angina pectoris 10/16/2018    Minimal coronary artery disease on coronary angiogram in 2015.      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     Major depressive disorder, single episode, severe, without mention of psychotic behavior      Mixed hyperlipidemia      Moderate aortic stenosis 05/2014    moderate to severe aortic valve stenosis     Pelvic relaxation disorder     Surgical intervention for cystocele/rectocele 3,11/2012     Personal history of urinary calculi 6/2006    left ureteral stone,lithotripsy     Psoriasis      PVC (premature ventricular contraction)      Spinal stenosis      Stage III chronic kidney disease (H) 2005     SVT (supraventricular tachycardia) (H)     likely atrial tachycardia       PAST SURGICAL HISTORY:  Past Surgical History:   Procedure Laterality Date     APPENDECTOMY  3/2004    incidental     ARTHRODESIS TOE(S) Right 1/31/2020    Procedure: RIGHT FIRST METATARSAL PHALANGEAL JOINT ARTHRODESIS;  Surgeon: Steven Reyes MD;  Location: [facility] OR     C GASTRIC BYPASS,OBESE<100CM ARIANNA-EN-Y  1996     C MEDIASTINOSCOPY W OR WO BIOPSY  2/2008    Videomediastinoscopy and, for mediastinal adenopathy -reactive lymphoid hyperplasia     C REPAIR OF RECTOCELE   3/2012     C TOTAL KNEE ARTHROPLASTY  12/2005    left      CARPAL TUNNEL RELEASE RT/LT  10/2010    Carpometacarpal excisional arthroplasty with a fascial autograft and APL suspension sling (84267). 2. Left thumb metacarpophalangeal joint fusion with autologous bone graft (50607). 3. Left endoscopic carpal tunnel release      CHOLECYSTECTOMY, LAPOROSCOPIC  11/2010    Cholecystectomy, Laparoscopic     COLONOSCOPY N/A 9/8/2016    Procedure: COMBINED COLONOSCOPY, SINGLE OR MULTIPLE BIOPSY/POLYPECTOMY BY BIOPSY;  Surgeon: Moe Barlwo MD;  Location:  GI     CYSTOCELE REPAIR  11/2012    davinc laparoscopic sacrocolpopexy, enterocele repair, lysis of adhesions, placement of retropubic mid urethral sling, cystoscopy     CYSTOSCOPY, LITHOTRIPSY, COMBINED  6/2006    Left extracorporeal shock wave lithotripsy, cystoscopy, left ureteral stent placement.     CYSTOSCOPY, REMOVE STENT(S), COMBINED  7/2006    Cystoscopy, removal of left ureteral stent, retrograde pyelography, flexible and rigid ureteroscopy and holmium laser lithotripsy, basket removal of stone fragments, ureteral stent placement.      ENDOSCOPIC ULTRASOUND UPPER GASTROINTESTINAL TRACT (GI) N/A 6/12/2017    Procedure: ENDOSCOPIC ULTRASOUND, ESOPHAGOSCOPY / UPPER GASTROINTESTINAL TRACT (GI);  ENDOSCOPIC ULTRASOUND, ESOPHAGOSCOPY / UPPER GASTROINTESTINAL TRACT (GI);  Surgeon: Parth Graham MD;  Location:  GI     HERNIA REPAIR  4/2012    bilateral augmentation mastopexy, ventral hernia repair, and medial thigh liposuction on 04/06/2012.      HYSTERECTOMY VAGINAL, BILATERAL SALPINGO-OOPHERECTOMY, COMBINED  1998    due to myoma and bleeding     JOINT REPLACEMENT, HIP RT/LT  4/2004    right total hip arthroplasty     LAPAROTOMY, LYSIS ADHESIONS, COMBINED  3/2004    lysis adhesions, ventral hernia repair, appendectomy incidentally     LYMPH NODE BIOPSY  4/2008    right axillary, reactive follicular and paracortical hyperplasia.     MAMMOPLASTY  AUGMENTATION BILATERAL  4/2012     REPAIR HAMMER TOE Right 1/31/2020    Procedure: WITH SECOND AND THIRD CLAW TOE RECONSTRUCTION;  Surgeon: Steven Reyes MD;  Location: [facility] OR     REVISE RECONSTRUCTED BREAST  6/7/2012    Left breast capsulotomy.        SOCIAL HISTORY:   Kavitha Headley  reports that she has never smoked. She has never used smokeless tobacco. She reports current alcohol use of about 63.0 standard drinks of alcohol per week. She reports that she does not use drugs.    FAMILY HISTORY:  Family History   Problem Relation Age of Onset     Substance Abuse Father      Cancer Father         throat and lung mets     Diabetes No family hx of      Coronary Artery Disease No family hx of      Cerebrovascular Disease No family hx of        PHYSICAL EXAMINATION:  Temp: 98.3  F (36.8  C) Temp src: Oral BP: 91/60 Pulse: 78 Heart Rate: 69 Resp: 16 SpO2: 91 % O2 Device: None (Room air) Oxygen Delivery: 1 LPM  02/07 0700 - 02/12 0659  In: 200 [P.O.:200]  Out: 1600 [Urine:1600]  Net: -1400  Vitals:    02/11/20 2200 02/12/20 0314   Weight: 72.4 kg (159 lb 11.2 oz) 71 kg (156 lb 8 oz)           Herman Ugarte MD

## 2020-02-12 NOTE — CONSULTS
INPATIENT CARDIOLOGY CONSULTATION.  [facility]  Date of Admission:2/11/2020  Date of Consult: 2/12/2020     PRIMARY CARDIOLOGIST:  Herman Ugarte MD      CARDIOLOGY TARUN:  Michael Kinsey PA-C      REFERRAL SOURCE:  Sam Laura MD, Hospitalist Service      REASON FOR REFERRAL:  Question of congestive heart failure causing exertional dyspnea.      HISTORY OF PRESENT ILLNESS:    Kavitha Headley and her daughter, Nery, are both known to me from clinic.  Kavitha is a very pleasant 74-year-old  lady who is known to have moderately severe aortic valve stenosis (confirmed on transesophageal echocardiogram), stable mild ascending aorta dilatation of 4.1 cm (confirmed on CT angiography), chronic mild thrombocytopenia, never tobacco user and a long history of alcohol dependence (abstinent since 10/2019).  She is also on long-term low-dose atenolol 25 mg for sinus tachycardia.        Kavitha underwent an uncomplicated right foot reconstruction, on 01/31/2020 and discharged home the same day.  She typically takes 20 mg of furosemide daily.  The furosemide was held on the day of the surgery and sounds as if it was not restarted.  A few days after the surgery, she started noting exertional dyspnea, mild orthopnea, but no PND, chest pain or palpitations.      Due to worsening symptoms, she presented to the emergency room yesterday, when her blood pressure was documented at 155/100 mmHg with normal pulse oximetry of 98 BPM.  An admission NT-proBNP is not available.  I reviewed the chest x-ray, which showed no pneumonia or pleural effusions and no pulmonary edema.  Her serial troponins are negative.  Renal function is normal with a creatinine of 0.87.  She is mildly anemic with a hemoglobin of 10.5 and her chronic thrombocytopenia has resolved with alcohol cessation and platelet count is up to 322.  ECG shows sinus bradycardia with first-degree AV block (on atenolol) without any ischemic  changes, and unchanged from previous.  She has low voltage QRS.  On her last coronary angiogram in 2015, she was noted to have minimal coronary artery disease.      Notably, she received a total of 80 mg of IV furosemide in divided doses since admission yesterday and has had diuresis and her dyspnea significantly improved.     SOCIAL HISTORY:  Single.  Lives on her own.  Her daughter Nery is her main caregiver.  Retired.  History of alcohol abuse including hospitalizations for withdrawal symptoms in the past; abstinent since 10/2019.  Never used tobacco.  No drug abuse.         PHYSICAL EXAMINATION:   GENERAL:  She is alert and oriented, no conversational dyspnea or hypoxemia or cyanosis.     VITAL SIGNS:  Saturations 91% on room air and 98% with 1 liter of oxygen.  BP is 129/78 mmHg, heart rate is 83 BPM.    CARDIOVASCULAR:  Her jugular venous pressure is slightly elevated at 5 cm in the neck.  Carotid upstroke is normal.  Apical impulse not palpable due to body habitus.  No parasternal heave.  Heart sounds are regular.  She has a 3/6 mid to late peaking ejection systolic murmur consistent with moderate to severe aortic valve stenosis.  No diastolic murmur.     LUNGS:  She has widespread bilateral rales up to her lung apices.   ABDOMEN:  Soft and nontender without abdominal wall edema or ascites.  No hepatomegaly.   EXTREMITIES:  No lower extremity edema.  Her right foot is in a small cast.      DIAGNOSES:   1.  Acute decompensated heart failure with preserved ejection fraction in the context of temporary discontinuation of diuretic.   2.  Moderately severe aortic valve stenosis.   3.  History of sinus tachycardia empirically treated with atenolol.   4.  Stable mild ascending aorta aneurysm of 4.1 cm.   5.  Mild coronary artery disease on coronary angiogram in 2015.   6.  History of alcohol dependence, abstinent since 10/2019.      ASSESSMENT AND PLAN:    I suspect that Kavitha is presenting with acute  decompensated heart failure with preserved ejection fraction in the context of diuretic discontinuation following her foot surgery 2 weeks ago.  She has had a good therapeutic response to IV furosemide; however, she still has significant bilateral rales on exam.      I did personally review her echocardiogram. It was reported as moderate to severe aortic valve stenosis with a peak velocity of 3.8 meters per second.  Certainly, it appears that her aortic valve stenosis is getting worse, but I do not think it needs intervention right away.  My advice is to treat her with diuretic therapy and reassess her aortic valve stenosis in 6 months as an outpatient.  Her serial troponins are negative and an NSTEMI is unlikely.      1.  IV furosemide 40 mg b.i.d.   2.  Aortic valve stenosis is moderate to severe and we will continue outpatient surveillance.   3.  Anticipated discharge to home in 1 or 2 days.  Cardiology will follow.      Thank you for consulting us.      TOTAL TIME:  50 minutes, greater than 50% spent in counseling and coordination of care.         PAIGE CORTEZ MD             D: 2020   T: 2020   MT: WAGNER      Name:     DARWIN JASSO   MRN:      [id number]        Account:       [id number]   :      1945           Consult Date:  2020      Document: [id number]       cc: Nayeli Castorena PA-C

## 2020-02-12 NOTE — CONSULTS
Care Transition Initial Assessment - RN        Met with: Patient.  DATA   Active Problems:    CHF (congestive heart failure) (H)       Cognitive Status: awake and alert.        Contact information and PCP information verified: Yes  Lives With: significant other      Insurance concerns: No Insurance issues identified  ASSESSMENT  Patient currently receives the following services:  none        Identified issues/concerns regarding health management: Received orders due to pt diagnosis of CHF. Pt was admitted on 2/11 with and anticipated discharge date of 2/13 or 2/14.  Pt states she lives in her own in a town home where all her needs were met on the main level.  She has a scale to check daily weights and plans to write those weights down.  Encouraged her to use a calendar or a note book to assist with this. She had a recent right foot reconstruction in 1/2020. She uses a walker to get around her home. She does not currently drive and relies on family and friends for transportation.    PLAN  Financial costs for the patient include copays.  Patient given options and choices for discharge; yes.  Patient/family is agreeable to the plan?  Yes:   Patient anticipates discharging to Home.        Patient anticipates needs for home equipment: No; she has a walker that she uses due to a recent R foot surgery.   Transportation/person available to transport on day of discharge is Nery  and have they been notified/set up: TBD  Plan/Disposition: Home   Appointments:     Feb 26, 2020  1:40 PM CST  Office Visit with Nayeli Castorena PA-C  [facility] [address] [place] [id number] [phone]     Care  (CTS) will continue to follow as needed.    Aisha Luz RN BAN  Inpatient Care Coordination  [address] [address]  [email or web address]   Office:  [phone]  Fax: [phone]  Gender pronouns: she/her/hers  Employed by [employer]

## 2020-02-12 NOTE — PLAN OF CARE
Patient alert, SBA up to commode. Right foot dressing with ortho shoe s/p foot surgery. GRULLON and when HOB lowered. Few crackles in base. Sat 92% on RA but dyspneic so 1L n/c applied. IV lasix, voiding frequently.  Dilaudid for foot pain as pt was taking PTA.  Trop neg. POC reviewed with pt.

## 2020-02-12 NOTE — H&P
[facility]    History and Physical  Hospitalist       Date of Admission:  2/11/2020  Date of Service (when I saw the patient): 02/11/20    Assessment & Plan   Kavitha Headley is a 74 year old female who presents with shortness of breath    Shortness of breath  Possible CHF exacerbation  H/o CAD, minimal disease on angiogram 2015  Moderate to severe aortic stenosis  Aortic root dilatation  PTA atenolol 25 mg daily, furosemide 20 mg daily, ASA 81 mg daily  Pt denies h/o CHF. Echo 11/2018 EF 60-65%, early diastolic dysfunction. With surgery 1 week ago at [facility]. Has held furosemide since surgery 2/2 miscommunication.  2-3 days prior to presentation noted increasing SOB, mariya with exertion and progressed to SOB at rest. Minimal chest tightness. Unclear if hypoxic on arrival. EKG noted normal. BNP 2536 (0-450). CT without PE. Possibly new heart failure vs aortic stenosis worsening.   - telemetry  - daily weights, I/O's  - troponin tonight and in the am   - lasix 40 mg IV BID  - echocardiogram in the am  - cardiology consult  - start lisinopril 10 mg daily    R foot surgery  S/p recent R foot reconstruction.   - continue prn hydromorphone  - hold ibuprofen with diuresis    Depression  PTA prn trazodone 100 mg prn, hydroxyzine 25 mg am/ 50 mg at HS, wellbutrin 150 mg in am, celexa 20 mg daily  - continue meds    HLD  HTN  PTA atenolol 25 mg daily  States was previously on cholesterol meds but stopped (no intolerance).   - check lipids  - continue atenolol    Chronic pain  PTA baclofen 10 mg BID, gabapentin 300 TID  - continue    H/o alcohol use disorder, in remission since 10/2019  Commended accomplishment    Ground glass nodule RUL  Noted on CT, radiology recommended pulmonary consult    DVT Prophylaxis: Anti-embolisim stockings (TEDs) and Ambulate every shift  Code Status: Full Code    Disposition: Expected discharge in 2-3 days pending workup for likely CHF    Sam Laura MD  [phone] (P)  Text  Sharri     Primary Care Physician   Dr. Nayeli Castorena    Chief Complaint   Shortness of breath    History is obtained from the patient and medical records    History of Present Illness   Kavitha Headley is a 74 year old female who presents with shortness of breath.  She has a past medical history remarkable for coronary disease which was minimal seen on angiogram in 2015, moderate to severe aortic stenosis, aortic root dilatation, recent right foot surgery/reconstruction, depression, hypertension, hyperlipidemia, chronic pain, alcohol use disorder in remission.  She was doing well and recently had reconstructive surgery in her right foot approximate 1 week ago.  Surgery went well, however a couple days after surgery she started developed shortness of breath.  Initially was only with exertion but then gradually worsened to at rest.  She notes that she also has some chest tightness with the shortness of breath.  She denied any associated nausea, vomiting or diaphoresis.  She denied any fevers or chills.  She is normally on Lasix 20 mg daily but she held this around the time of surgery.  There was miscommunication and she has not taken the Lasix since.  She has not noticed any lower extremity edema.  She notes that she is on the furosemide for edema and not for heart failure as she has never heard that she had heart failure in the past.    Past Medical History    I have reviewed this patient's medical history and updated it with pertinent information if needed.   Past Medical History:   Diagnosis Date     Alcohol abuse     long term alcohol abuse     Alcohol dependence (H)      Anxiety disorder      Aortic stenosis      Aortic stenosis      Ascending aorta dilatation (H)      Bariatric surgery status 1996?    gastric bypass, [facility] and     Benign hypertension      Chronic insomnia      Chronic pain syndrome     Chronic back and neck pain, chronic pain due to osteoarthritis multiple joints     Coronary artery  disease 9/2015    mild distal branch disease on cath     Coronary artery disease involving native coronary artery of native heart without angina pectoris 10/16/2018    Minimal coronary artery disease on coronary angiogram in 2015.      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     Major depressive disorder, single episode, severe, without mention of psychotic behavior      Mixed hyperlipidemia      Moderate aortic stenosis 5/2014    mild/mod AS with peak gradient 33/mean gradient 20 mmHg, AV area 1.2     Murmur      Pelvic relaxation disorder     Surgical intervention for cystocele/rectocele 3,11/2012     Personal history of urinary calculi 6/2006    left ureteral stone,lithotripsy     Psoriasis      PVC (premature ventricular contraction)      Sinus tachycardia      Spinal stenosis      Stage III chronic kidney disease (H) 2005     SVT (supraventricular tachycardia) (H)     likely atrial tachycardia       Past Surgical History   I have reviewed this patient's surgical history and updated it with pertinent information if needed.  Past Surgical History:   Procedure Laterality Date     APPENDECTOMY  3/2004    incidental     ARTHRODESIS TOE(S) Right 1/31/2020    Procedure: RIGHT FIRST METATARSAL PHALANGEAL JOINT ARTHRODESIS;  Surgeon: Steven Reyes MD;  Location: [facility] OR     C GASTRIC BYPASS,OBESE<100CM ARIANNA-EN-Y  1996     C MEDIASTINOSCOPY W OR WO BIOPSY  2/2008    Videomediastinoscopy and, for mediastinal adenopathy -reactive lymphoid hyperplasia     C REPAIR OF RECTOCELE  3/2012     C TOTAL KNEE ARTHROPLASTY  12/2005    left      CARPAL TUNNEL RELEASE RT/LT  10/2010    Carpometacarpal excisional arthroplasty with a fascial autograft and APL  suspension sling (33189). 2. Left thumb metacarpophalangeal joint fusion with autologous bone graft (15012). 3. Left endoscopic carpal tunnel release      CHOLECYSTECTOMY, LAPOROSCOPIC  11/2010    Cholecystectomy, Laparoscopic     COLONOSCOPY N/A 9/8/2016    Procedure: COMBINED COLONOSCOPY, SINGLE OR MULTIPLE BIOPSY/POLYPECTOMY BY BIOPSY;  Surgeon: Moe Barlow MD;  Location:  GI     CYSTOCELE REPAIR  11/2012    davinci laparoscopic sacrocolpopexy, enterocele repair, lysis of adhesions, placement of retropubic mid urethral sling, cystoscopy     CYSTOSCOPY, LITHOTRIPSY, COMBINED  6/2006    Left extracorporeal shock wave lithotripsy, cystoscopy, left ureteral stent placement.     CYSTOSCOPY, REMOVE STENT(S), COMBINED  7/2006    Cystoscopy, removal of left ureteral stent, retrograde pyelography, flexible and rigid ureteroscopy and holmium laser lithotripsy, basket removal of stone fragments, ureteral stent placement.      ENDOSCOPIC ULTRASOUND UPPER GASTROINTESTINAL TRACT (GI) N/A 6/12/2017    Procedure: ENDOSCOPIC ULTRASOUND, ESOPHAGOSCOPY / UPPER GASTROINTESTINAL TRACT (GI);  ENDOSCOPIC ULTRASOUND, ESOPHAGOSCOPY / UPPER GASTROINTESTINAL TRACT (GI);  Surgeon: Parth Graham MD;  Location:  GI     HERNIA REPAIR  4/2012    bilateral augmentation mastopexy, ventral hernia repair, and medial thigh liposuction on 04/06/2012.      HYSTERECTOMY VAGINAL, BILATERAL SALPINGO-OOPHERECTOMY, COMBINED  1998    due to myoma and bleeding     JOINT REPLACEMENT, HIP RT/LT  4/2004    right total hip arthroplasty     LAPAROTOMY, LYSIS ADHESIONS, COMBINED  3/2004    lysis adhesions, ventral hernia repair, appendectomy incidentally     LYMPH NODE BIOPSY  4/2008    right axillary, reactive follicular and paracortical hyperplasia.     MAMMOPLASTY AUGMENTATION BILATERAL  4/2012     REPAIR HAMMER TOE Right 1/31/2020    Procedure: WITH SECOND AND THIRD CLAW TOE RECONSTRUCTION;  Surgeon: Steven Reyes MD;  Location:  OR      REVISE RECONSTRUCTED BREAST  6/7/2012    Left breast capsulotomy.        Prior to Admission Medications   Prior to Admission Medications   Prescriptions Last Dose Informant Patient Reported? Taking?   BIOTIN PO  Self Yes No   Sig: Take 1 tablet by mouth every morning   FOLIC ACID PO  Self Yes No   Sig: Take 1 tablet by mouth daily   HUMIRA PEN-PS/UV/ADOL HS START 40 MG/0.8ML pen kit  Self No No   Sig: Inject 0.8 mLs (40 mg) Subcutaneous every 14 days   HYDROmorphone (DILAUDID) 2 MG tablet   No No   Sig: Take 1 tablet (2 mg) by mouth every 4 hours as needed for moderate to severe pain   Multiple Vitamins-Minerals (CENTRUM SILVER) per tablet  Self Yes No   Sig: Take 1 tablet by mouth daily   STATIN NOT PRESCRIBED, INTENTIONAL,  Self No No   Sig: Please choose reason not prescribed, below   TURMERIC PO  Self Yes No   Sig: Take 1 capsule by mouth every morning   aspirin (ASA) 81 MG tablet  Self Yes No   Sig: Take 81 mg by mouth daily    aspirin-acetaminophen-caffeine (EXCEDRIN MIGRAINE) 250-250-65 MG tablet  Self Yes No   Sig: Take 2 tablets by mouth daily as needed for headaches   atenolol (TENORMIN) 25 MG tablet  Self No No   Sig: Take 1 tablet (25 mg) by mouth every morning   baclofen (LIORESAL) 10 MG tablet  Self No No   Sig: Take 1 tablet (10 mg) by mouth 2 times daily   buPROPion (WELLBUTRIN XL) 150 MG 24 hr tablet  Self No No   Sig: Take 1 tablet (150 mg) by mouth every morning   cephALEXin (KEFLEX) 500 MG capsule   No No   Sig: Take 1 capsule (500 mg) by mouth 3 times daily for 2 days   citalopram (CELEXA) 20 MG tablet  Self No No   Sig: Take 1 tablet (20 mg) by mouth daily   furosemide (LASIX) 20 MG tablet  Self No No   Sig: Take 1 tablet (20 mg) by mouth daily   gabapentin (NEURONTIN) 300 MG capsule  Self No No   Sig: TAKE 1 CAPSULE BY MOUTH THREE TIMES A DAY   hydrOXYzine (ATARAX) 25 MG tablet  Self Yes No   Sig: Take 25 mg by mouth every morning TAKES IN ADDITION TO EVENING DOSE.   hydrOXYzine (ATARAX) 25 MG  tablet  Self Yes No   Sig: Take 50 mg by mouth At Bedtime (25MG X 2 = 50MG)  TAKES IN ADDITION TO MORNING DOSE.   hydrocortisone (CORTAID) 1 % external ointment  Self No No   Sig: Apply topically 3 times daily for 5 days To corners of mouth   ibuprofen (ADVIL/MOTRIN) 600 MG tablet   No No   Sig: Take 1 tablet (600 mg) by mouth every 6 hours as needed for other (inflammatory pain)   mupirocin (BACTROBAN) 2 % external ointment  Self No No   Sig: Apply topically 3 times daily for 5 days TO CORNERS OF MOUTH   nystatin (MYCOSTATIN) 513221 UNIT/GM external ointment  Self No No   Sig: Apply topically 3 times daily for 5 days To corners of mouth   ondansetron (ZOFRAN-ODT) 4 MG ODT tab   No No   Sig: Take 1 tablet (4 mg) by mouth every 6 hours as needed for nausea or vomiting   polyethylene glycol (MIRALAX/GLYCOLAX) Packet  Self No No   Sig: Take 17 g by mouth daily as needed (constipation)   senna-docusate (SENOKOT-S;PERICOLACE) 8.6-50 MG per tablet  Self No No   Sig: Take 1-2 tablets by mouth 2 times daily as needed for constipation   traZODone (DESYREL) 100 MG tablet  Self No No   Sig: TAKE 1 TABLET (100 MG) BY MOUTH NIGHTLY AS NEEDED FOR SLEEP   valACYclovir (VALTREX) 500 MG tablet  Self No No   Sig: Take 1 tablet (500 mg) by mouth 3 times daily for 5 days   vitamin C (ASCORBIC ACID) 250 MG tablet  Self Yes No   Sig: Take 250 mg by mouth daily      Facility-Administered Medications: None     Allergies   Allergies   Allergen Reactions     Bactrim [Sulfamethoxazole W/Trimethoprim] Hives     Codeine Itching     NAUSEA     Morphine Itching     NAUSEA       Social History   I have reviewed this patient's social history and updated it with pertinent information if needed. Kavitha GARCIA Headley  reports that she has never smoked. She has never used smokeless tobacco. She reports current alcohol use of about 63.0 standard drinks of alcohol per week. She reports that she does not use drugs.    Family History   I have reviewed this  patient's family history and updated it with pertinent information if needed.   Family History   Problem Relation Age of Onset     Substance Abuse Father      Cancer Father         throat and lung mets     Diabetes No family hx of      Coronary Artery Disease No family hx of      Cerebrovascular Disease No family hx of        Review of Systems   The 10 point Review of Systems is negative other than noted in the HPI or here.     Physical Exam   Temp: 98  F (36.7  C)   BP: (!) 155/99     Resp: 20 SpO2: 98 % O2 Device: Nasal cannula Oxygen Delivery: 1 LPM  Vital Signs with Ranges  159 lbs 11.2 oz    Constitutional: alert, oriented and in no acute distress  Eyes: EOMI, PERRL  HEENT: OP clear  Respiratory: bibasilar crackles, coarse  Cardiovascular: RRR with prominent DAVID noted  GI: soft, nontender, nondistended, no HSM  Lymph/Hematologic: no cervical LAD  Genitourinary: deferred  Skin: no rashes or lesions grossly  Musculoskeletal: no deformities or arthritis. R foot in bandage/ boot  Neurologic: CN II-XII, FORD, sensation grossly intact  Psychiatric: mood and affect wnl    Data   Data reviewed today:  I personally reviewed the chest CT image(s) showing no PE.  No lab results found in last 7 days.    No results found for this or any previous visit (from the past 24 hour(s)).

## 2020-02-12 NOTE — PROGRESS NOTES
02/12/20 1530   Quick Adds   Type of Visit Initial Occupational Therapy Evaluation   Living Environment   Lives With significant other   Living Arrangements   (Town home- two story )   Home Accessibility stairs to enter home;stairs within home   Transportation Anticipated family or friend will provide  (Pt typically drives )   Living Environment Comment Significant other stays with patient Fridays, Saturdays, Sundays, Mondays. All needs met on the main level.    Self-Care   Usual Activity Tolerance good   Current Activity Tolerance moderate   Regular Exercise No   Equipment Currently Used at Home shower chair;walker, standard   Activity/Exercise/Self-Care Comment Pt has been using a walker and shower chair since her recent R foot surgery.    Functional Level   Ambulation 1-->assistive equipment   Transferring 0-->independent   Toileting 0-->independent   Bathing 1-->assistive equipment  (shower chair )   Dressing 0-->independent   Fall history within last six months no   General Information   Onset of Illness/Injury or Date of Surgery - Date 02/11/20   Referring Physician Sam Laura MD   Patient/Family Goals Statement Home    Additional Occupational Profile Info/Pertinent History of Current Problem Per chart: Kavitha Headley is a 74 year old female who presents with shortness of breath.  She has a past medical history remarkable for coronary disease which was minimal seen on angiogram in 2015, moderate to severe aortic stenosis, aortic root dilatation, recent right foot surgery/reconstruction, depression, hypertension, hyperlipidemia, chronic pain, alcohol use disorder in remission.  She was doing well and recently had reconstructive surgery in her right foot approximate 1 week ago. Per chart: RIGHT FIRST METATARSAL PHALANGEAL JOINT ARTHRODESIS on 1-31-20. Acute on chronic diastolic congestive heart failure. Paged MD to clarify weight bearing orders.    General Info Comments Pt has a Post- OP boot,  post op boot on during session.    Cognitive Status Examination   Orientation orientation to person, place and time   Level of Consciousness alert   Sensory Examination   Sensory Quick Adds   (Numbness in R foot- since surgery )   Pain Assessment   Patient Currently in Pain Yes, see Vital Sign flowsheet   Mobility   Bed Mobility Bed mobility skill: Sit to supine;Bed mobility skill: Supine to sit   Bed Mobility Skill: Sit to Supine   Level of [place]: Sit/Supine stand-by assist   Physical Assist/Nonphysical Assist: Sit/Supine set-up required;verbal cues   Bed Mobility Skill: Supine to Sit   Level of [place]: Supine/Sit stand-by assist   Physical Assist/Nonphysical Assist: Supine/Sit set-up required;verbal cues   Transfer Skills   Transfer Transfer Safety Analysis Bed/Chair;Transfer Skill: Stand to Sit;Transfer Safety Analysis Sit/Stand   Transfer Skill: Sit to Stand   Level of [place]: Sit/Stand stand-by assist   Physical Assist/Nonphysical Assist: Sit/Stand verbal cues;set-up required   Toilet Transfer   Toilet Transfer Toilet Transfer Skill;Toilet Transfer Safety Analysis   Transfer Skill: Toilet Transfer   Level of [place]: Toilet stand-by assist   Physical Assist/Nonphysical Assist: Toilet set-up required;verbal cues   Instrumental Activities of Daily Living (IADL)   Previous Responsibilities meal prep;housekeeping;laundry;shopping;medication management;finances;driving   IADL Comments Since R foot surgey, pt has been having assist in all IADLs from daughter and significant other.    General Therapy Interventions   Planned Therapy Interventions ADL retraining;IADL retraining;transfer training;risk factor education;progressive activity/exercise;home program guidelines   Clinical Impression   Criteria for Skilled Therapeutic Interventions Met yes, treatment indicated   OT Diagnosis Decreased activity tolerance for I/ADLs    Influenced by the following impairments Decreased activity tolerance for  "I/ADLs    Assessment of Occupational Performance 1-3 Performance Deficits   Identified Performance Deficits Decreased activity tolerance for I/ADLs (Dressing, bathing, toileting)   Clinical Decision Making (Complexity) Low complexity   Therapy Frequency Daily   Predicted Duration of Therapy Intervention (days/wks) 3 days    Anticipated Discharge Disposition Home with Assist   Risks and Benefits of Treatment have been explained. Yes   Patient, Family & other staff in agreement with plan of care Yes   [facility] \"6 Clicks\"   2016, Trustees of [facility], under license to "[employer].  All rights reserved.   6 Clicks Short Forms Daily Activity Inpatient Short Form   [facility]  \"6 Clicks\" Daily Activity Inpatient Short Form   1. Putting on and taking off regular lower body clothing? 3 - A Little   2. Bathing (including washing, rinsing, drying)? 3 - A Little   3. Toileting, which includes using toilet, bedpan or urinal? 3 - A Little   4. Putting on and taking off regular upper body clothing? 4 - None   5. Taking care of personal grooming such as brushing teeth? 3 - A Little   6. Eating meals? 4 - None   Daily Activity Raw Score (Score out of 24.Lower scores equate to lower levels of function) 20   Total Evaluation Time   Total Evaluation Time (Minutes) 8     "

## 2020-02-12 NOTE — PROGRESS NOTES
[facility]    Hospitalist Progress Note    Brief Summary:  Kavitha Headley is a 74 year old female who presents with shortness of breath.  She has a past medical history remarkable for coronary disease which was minimal seen on angiogram in 2015, moderate to severe aortic stenosis, aortic root dilatation, recent right foot surgery/reconstruction, depression, hypertension, hyperlipidemia, chronic pain, alcohol use disorder in remission.  She was doing well and recently had reconstructive surgery in her right foot approximate 1 week ago.   Since her surgery she is not taking her Lasix and using ibuprofen on a daily basis.       Assessment & Plan     Acute on chronic diastolic congestive heart failure  H/o CAD, minimal disease on angiogram 2015  Moderate to severe aortic stenosis  Aortic root dilatation  PTA atenolol 25 mg daily, furosemide 20 mg daily, ASA 81 mg daily  Pt denies h/o CHF. Echo 11/2018 EF 60-65%, early diastolic dysfunction. With surgery 1 week ago at [facility]. Has held furosemide since surgery 2/2 miscommunication and using NSAIDs regularly.  2-3 days prior to presentation noted increasing SOB, mariya with exertion and progressed to SOB at rest and lower extremity edema.. Minimal chest tightness.  Start IV Lasix 40 mg twice daily, she responded very well to the treatment.  Lower extremity edema improved her shortness of breath is improved as well.  Continue to hold NSAIDs.  Repeat echocardiogram.  Audiology consulted on admission awaiting their recommendation at this time.       R foot surgery  S/p recent R foot reconstruction.   - continue prn hydromorphone  - hold ibuprofen with diuresis     Depression  PTA prn trazodone 100 mg prn, hydroxyzine 25 mg am/ 50 mg at HS, wellbutrin 150 mg in am, celexa 20 mg daily  - continue meds     HLD  HTN  PTA atenolol 25 mg daily  States was previously on cholesterol meds but stopped (no intolerance).   - check lipids  - continue atenolol     Chronic  pain  PTA baclofen 10 mg BID, gabapentin 300 TID  - continue     H/o alcohol use disorder, in remission since 10/2019  Commended accomplishment     Ground glass nodule RUL  Noted on CT, radiology recommended pulmonary consult       She responded to a diuresis really well.  Maybe decrease the Lasix to 20 from this evening.  Continue intake output charting and daily weights.  Echo done shows moderate to severe aortic stenosis.  Cardiology is consulted awaiting their recommendations.  To wean her off the oxygen today.     DVT Prophylaxis: Pneumatic Compression Devices  Code Status: Full Code    Disposition: Expected discharge in 1 to 2 days once remains stable.     Latrell Corbett MD  Text Page  (7am - 6pm)    Interval History   Feeling better this morning.  Denies any chest pain shortness breath [place] palpitation at this time.    No other significant event overnight    -Data reviewed today: I reviewed all new labs and imaging results over the last 24 hours. I personally reviewed no images or EKG's today.    Physical Exam   Temp: 98.3  F (36.8  C) Temp src: Oral BP: 91/60 Pulse: 78 Heart Rate: 69 Resp: 16 SpO2: 91 % O2 Device: None (Room air) Oxygen Delivery: 1 LPM  Vitals:    02/11/20 2200 02/12/20 0314   Weight: 72.4 kg (159 lb 11.2 oz) 71 kg (156 lb 8 oz)     Vital Signs with Ranges  Temp:  [98  F (36.7  C)-98.4  F (36.9  C)] 98.3  F (36.8  C)  Pulse:  [71-78] 78  Heart Rate:  [69-83] 69  Resp:  [16-20] 16  BP: ()/(60-99) 91/60  SpO2:  [91 %-98 %] 91 %  I/O last 3 completed shifts:  In: 200 [P.O.:200]  Out: 1600 [Urine:1600]    Constitutional: awake, alert, cooperative, no apparent distress, and appears stated age  Eyes: Lids and lashes normal, pupils equal, round and reactive to light, extra ocular muscles intact, sclera clear, conjunctiva normal  Respiratory: Decreased air entry bilaterally  Cardiovascular: Normal apical impulse, regular rate and rhythm, normal S1 and S2, no S3 or S4, and systolic murmur  noted  GI: No scars, normal bowel sounds, soft, non-distended, non-tender, no masses palpated, no hepatosplenomegally  Skin: no bruising or bleeding  Musculoskeletal: no lower extremity pitting edema present  Neurologic: No focal deficit    Medications     - MEDICATION INSTRUCTIONS -         aspirin  81 mg Oral Daily     atenolol  25 mg Oral QAM     baclofen  10 mg Oral BID     buPROPion  150 mg Oral QAM     citalopram  20 mg Oral Daily     folic acid  1 mg Oral Daily     [START ON 2020] furosemide  40 mg Intravenous BID      [START ON 2020] gabapentin  300 mg Oral QAM     gabapentin  600 mg Oral At Bedtime     hydrOXYzine  25 mg Oral QAM     hydrOXYzine  50 mg Oral At Bedtime     lisinopril  10 mg Oral Daily     multivitamin w/minerals  1 tablet Oral Daily     sodium chloride (PF)  3 mL Intracatheter Q8H     traZODone  100 mg Oral At Bedtime       Data   Recent Labs   Lab 20  0534 20  0049   WBC 7.1  --    HGB 10.5*  --    MCV 94  --      --      --    POTASSIUM 3.6  --    CHLORIDE 98  --    CO2 34*  --    BUN 12  --    CR 0.87  --    ANIONGAP 5  --    BIRGIT 9.6  --    GLC 93  --    TROPI <0.015 <0.015       Recent Results (from the past 24 hour(s))   XR Chest 2 Views    Narrative    CHEST TWO VIEWS 2020 10:09 AM     HISTORY: 74-year-old woman with shortness of breath.       Impression    IMPRESSION: Since CT exam on 2019, heart size is normal.  Small bilateral pleural effusions. Slight bibasilar scattered  opacities suggestive of atelectasis. No pneumothorax.    ZEYNEP CASTRO MD   Echocardiogram Complete    Narrative    [id number]  [id number]  [id number]  [id number]^GELACIO^ROSA^MARCIA           [facility]  Echocardiography Laboratory  [address] [place] [address]        Name: DARWIN JASSO  MRN: [id number]  : 1945  Study Date: 2020 10:44 AM  Age: 74 yrs  Gender: Female  Patient Location: [facility]  Reason For Study:  CHF  Ordering Physician: ROSA BRIZUELA  Referring Physician: Nayeli Castorena  Performed By: Melva Cotter     BSA: 1.8 m2  Height: 64 in  Weight: 159 lb  HR: 57  BP: 129/78 mmHg  _____________________________________________________________________________  __        Procedure  Complete Echo Adult. Optison (NDC #[id number]) given intravenously.  _____________________________________________________________________________  __        Interpretation Summary        Left ventricular size, global systolic function, and wall motion are normal,  estimated LVEF 55-60%.  Right ventricular global function is normal.  Moderate to severe aortic stenosis, Vmax 3.8 m/s, mean gradient 33 mmHg, RENE  0.75cm2 (VTI), DI 0.21, SVi borderline normal 36.7cc/m2.  Ascending aorta is Mildly dilated. Max diameter of the visualized portion 4  cm.     This study was compared to a TTE from 7/2019. The severity of aortic stenosis  has progressed.  _____________________________________________________________________________  __        Left Ventricle  The left ventricle is normal in size. There is concentric remodeling present.  Left ventricular systolic function is normal. The visual ejection fraction is  estimated at 55-60%. Left ventricular diastolic function is indeterminate.  Normal left ventricular wall motion.     Right Ventricle  The right ventricle is mildly dilated. The right ventricular systolic function  is normal.     Atria  The left atrium is mildly dilated. Right atrial size is normal.     Mitral Valve  There is mild mitral annular calcification. The mitral valve leaflets appear  thickened, but open well. There is mild (1+) mitral regurgitation.        Tricuspid Valve  The tricuspid valve is not well visualized, but is grossly normal. There is  trace tricuspid regurgitation. The right ventricular systolic pressure is  approximated at 13.9 mmHg plus the right atrial pressure. Right ventricular  systolic pressure is normal.      Aortic Valve  The aortic valve is trileaflet. There is trace aortic regurgitation. Moderate  to severe valvular aortic stenosis. Moderate to severe aortic stenosis, Vmax  3.8 m/s, mean gradient 33 mmHg, RENE 0.75cm2 (VTI), DI 0.21, SVi borderline  normal 36.7cc/m2.     Pulmonic Valve  The pulmonic valve is not well seen, but is grossly normal. This degree of  valvular regurgitation is within normal limits.     Vessels  The aortic root is normal size. The ascending aorta is Mildly dilated. Max  diameter of the visualized portion 4 cm. The inferior vena cava was normal in  size with preserved respiratory variability.     Pericardium  There is no pericardial effusion.     _____________________________________________________________________________  __  MMode/2D Measurements & Calculations  IVSd: 0.96 cm  LVIDd: 4.0 cm  LVIDs: 2.4 cm  LVPWd: 0.96 cm  FS: 39.5 %  LV mass(C)d: 121.1 grams  LV mass(C)dI: 68.3 grams/m2     Ao root diam: 2.8 cm  LA dimension: 4.1 cm  asc Aorta Diam: 4.0 cm  LA/Ao: 1.5  LVOT diam: 2.0 cm  LVOT area: 3.0 cm2  RWT: 0.48        Doppler Measurements & Calculations  MV E max enrique: 94.4 cm/sec  MV A max enrique: 83.6 cm/sec  MV E/A: 1.1  MV max P.6 mmHg  MV mean P.8 mmHg  MV V2 VTI: 37.6 cm  MVA(VTI): 1.7 cm2  MV P1/2t max enrique: 128.1 cm/sec  MV P1/2t: 125.1 msec  MVA(P1/2t): 1.8 cm2  MV dec slope: 300.0 cm/sec2  Ao V2 max: 375.1 cm/sec  Ao max P.0 mmHg  Ao V2 mean: 279.9 cm/sec  Ao mean P.3 mmHg  Ao V2 VTI: 86.7 cm  RENE(I,D): 0.75 cm2  RENE(V,D): 0.65 cm2  LV V1 max P.6 mmHg  LV V1 max: 80.1 cm/sec  LV V1 VTI: 21.4 cm  SV(LVOT): 65.2 ml  SI(LVOT): 36.7 ml/m2  PA acc time: 0.12 sec  TR max enrique: 186.2 cm/sec  TR max P.9 mmHg  AV Enrique Ratio (DI): 0.21  RENE Index (cm2/m2): 0.42     E/E' av.6  Lateral E/e': 19.5  Medial E/e': 23.6           _____________________________________________________________________________  __           Report approved by: Felipe Bateman 2020  01:09 PM

## 2020-02-13 ENCOUNTER — APPOINTMENT (OUTPATIENT)
Dept: CT IMAGING | Facility: CLINIC | Age: 75
DRG: 291 | End: 2020-02-13
Attending: INTERNAL MEDICINE
Payer: COMMERCIAL

## 2020-02-13 LAB
ALBUMIN SERPL-MCNC: 2.9 G/DL (ref 3.4–5)
ANION GAP SERPL CALCULATED.3IONS-SCNC: 2 MMOL/L (ref 3–14)
ANISOCYTOSIS BLD QL SMEAR: SLIGHT
BASOPHILS # BLD AUTO: 0 10E9/L (ref 0–0.2)
BASOPHILS NFR BLD AUTO: 0 %
BUN SERPL-MCNC: 18 MG/DL (ref 7–30)
CALCIUM SERPL-MCNC: 8.9 MG/DL (ref 8.5–10.1)
CHLORIDE SERPL-SCNC: 98 MMOL/L (ref 94–109)
CO2 SERPL-SCNC: 39 MMOL/L (ref 20–32)
CREAT SERPL-MCNC: 1.51 MG/DL (ref 0.52–1.04)
DIFFERENTIAL METHOD BLD: ABNORMAL
EOSINOPHIL # BLD AUTO: 0.3 10E9/L (ref 0–0.7)
EOSINOPHIL NFR BLD AUTO: 5 %
ERYTHROCYTE [DISTWIDTH] IN BLOOD BY AUTOMATED COUNT: 15.2 % (ref 10–15)
GFR SERPL CREATININE-BSD FRML MDRD: 33 ML/MIN/{1.73_M2}
GLUCOSE SERPL-MCNC: 87 MG/DL (ref 70–99)
HCT VFR BLD AUTO: 30.1 % (ref 35–47)
HGB BLD-MCNC: 10 G/DL (ref 11.7–15.7)
LYMPHOCYTES # BLD AUTO: 2.5 10E9/L (ref 0.8–5.3)
LYMPHOCYTES NFR BLD AUTO: 41 %
MCH RBC QN AUTO: 31.4 PG (ref 26.5–33)
MCHC RBC AUTO-ENTMCNC: 33.2 G/DL (ref 31.5–36.5)
MCV RBC AUTO: 95 FL (ref 78–100)
MONOCYTES # BLD AUTO: 0.6 10E9/L (ref 0–1.3)
MONOCYTES NFR BLD AUTO: 10 %
NEUTROPHILS # BLD AUTO: 2.6 10E9/L (ref 1.6–8.3)
NEUTROPHILS NFR BLD AUTO: 44 %
OVALOCYTES BLD QL SMEAR: SLIGHT
PHOSPHATE SERPL-MCNC: 4.6 MG/DL (ref 2.5–4.5)
PLATELET # BLD AUTO: 286 10E9/L (ref 150–450)
PLATELET # BLD EST: ABNORMAL 10*3/UL
POTASSIUM SERPL-SCNC: 3.3 MMOL/L (ref 3.4–5.3)
POTASSIUM SERPL-SCNC: 4.3 MMOL/L (ref 3.4–5.3)
PROCALCITONIN SERPL-MCNC: 0.05 NG/ML
RBC # BLD AUTO: 3.18 10E12/L (ref 3.8–5.2)
SODIUM SERPL-SCNC: 139 MMOL/L (ref 133–144)
WBC # BLD AUTO: 6 10E9/L (ref 4–11)

## 2020-02-13 PROCEDURE — 85025 COMPLETE CBC W/AUTO DIFF WBC: CPT | Performed by: INTERNAL MEDICINE

## 2020-02-13 PROCEDURE — 25800030 ZZH RX IP 258 OP 636: Performed by: PHYSICIAN ASSISTANT

## 2020-02-13 PROCEDURE — P9041 ALBUMIN (HUMAN),5%, 50ML: HCPCS | Performed by: INTERNAL MEDICINE

## 2020-02-13 PROCEDURE — 25000128 H RX IP 250 OP 636: Performed by: INTERNAL MEDICINE

## 2020-02-13 PROCEDURE — 84132 ASSAY OF SERUM POTASSIUM: CPT | Performed by: INTERNAL MEDICINE

## 2020-02-13 PROCEDURE — 71250 CT THORAX DX C-: CPT

## 2020-02-13 PROCEDURE — 84145 PROCALCITONIN (PCT): CPT | Performed by: INTERNAL MEDICINE

## 2020-02-13 PROCEDURE — 21000001 ZZH R&B HEART CARE

## 2020-02-13 PROCEDURE — 93005 ELECTROCARDIOGRAM TRACING: CPT

## 2020-02-13 PROCEDURE — 80069 RENAL FUNCTION PANEL: CPT | Performed by: INTERNAL MEDICINE

## 2020-02-13 PROCEDURE — 93010 ELECTROCARDIOGRAM REPORT: CPT | Performed by: INTERNAL MEDICINE

## 2020-02-13 PROCEDURE — 99232 SBSQ HOSP IP/OBS MODERATE 35: CPT | Performed by: INTERNAL MEDICINE

## 2020-02-13 PROCEDURE — 25000132 ZZH RX MED GY IP 250 OP 250 PS 637: Performed by: INTERNAL MEDICINE

## 2020-02-13 PROCEDURE — 99233 SBSQ HOSP IP/OBS HIGH 50: CPT | Performed by: INTERNAL MEDICINE

## 2020-02-13 PROCEDURE — 36415 COLL VENOUS BLD VENIPUNCTURE: CPT | Performed by: INTERNAL MEDICINE

## 2020-02-13 RX ORDER — PANTOPRAZOLE SODIUM 40 MG/1
40 TABLET, DELAYED RELEASE ORAL
Status: DISCONTINUED | OUTPATIENT
Start: 2020-02-13 | End: 2020-02-15 | Stop reason: HOSPADM

## 2020-02-13 RX ORDER — FUROSEMIDE 20 MG
20 TABLET ORAL EVERY MORNING
Status: DISCONTINUED | OUTPATIENT
Start: 2020-02-15 | End: 2020-02-13

## 2020-02-13 RX ORDER — ALBUMIN, HUMAN INJ 5% 5 %
25 SOLUTION INTRAVENOUS ONCE
Status: COMPLETED | OUTPATIENT
Start: 2020-02-13 | End: 2020-02-13

## 2020-02-13 RX ORDER — CALCIUM CARBONATE 500 MG/1
500 TABLET, CHEWABLE ORAL 3 TIMES DAILY PRN
Status: DISCONTINUED | OUTPATIENT
Start: 2020-02-13 | End: 2020-02-15 | Stop reason: HOSPADM

## 2020-02-13 RX ADMIN — HYDROXYZINE HYDROCHLORIDE 25 MG: 25 TABLET, FILM COATED ORAL at 09:16

## 2020-02-13 RX ADMIN — ASPIRIN 81 MG: 81 TABLET, DELAYED RELEASE ORAL at 09:15

## 2020-02-13 RX ADMIN — HYDROMORPHONE HYDROCHLORIDE 2 MG: 2 TABLET ORAL at 01:18

## 2020-02-13 RX ADMIN — ALBUMIN HUMAN 25 G: 0.05 INJECTION, SOLUTION INTRAVENOUS at 13:44

## 2020-02-13 RX ADMIN — SENNOSIDES AND DOCUSATE SODIUM 2 TABLET: 8.6; 5 TABLET ORAL at 20:37

## 2020-02-13 RX ADMIN — SENNOSIDES AND DOCUSATE SODIUM 1 TABLET: 8.6; 5 TABLET ORAL at 09:15

## 2020-02-13 RX ADMIN — FOLIC ACID 1 MG: 1 TABLET ORAL at 09:17

## 2020-02-13 RX ADMIN — POTASSIUM CHLORIDE 20 MEQ: 1500 TABLET, EXTENDED RELEASE ORAL at 12:27

## 2020-02-13 RX ADMIN — PANTOPRAZOLE SODIUM 40 MG: 40 TABLET, DELAYED RELEASE ORAL at 13:23

## 2020-02-13 RX ADMIN — MULTIPLE VITAMINS W/ MINERALS TAB 1 TABLET: TAB at 09:16

## 2020-02-13 RX ADMIN — HYDROXYZINE HYDROCHLORIDE 50 MG: 25 TABLET, FILM COATED ORAL at 20:30

## 2020-02-13 RX ADMIN — HYDROMORPHONE HYDROCHLORIDE 2 MG: 2 TABLET ORAL at 09:12

## 2020-02-13 RX ADMIN — POLYETHYLENE GLYCOL 3350 17 G: 17 POWDER, FOR SOLUTION ORAL at 01:03

## 2020-02-13 RX ADMIN — GABAPENTIN 300 MG: 300 CAPSULE ORAL at 09:16

## 2020-02-13 RX ADMIN — SODIUM CHLORIDE 500 ML: 9 INJECTION, SOLUTION INTRAVENOUS at 11:19

## 2020-02-13 RX ADMIN — Medication 1 MG: at 20:30

## 2020-02-13 RX ADMIN — POLYETHYLENE GLYCOL 3350 17 G: 17 POWDER, FOR SOLUTION ORAL at 20:29

## 2020-02-13 RX ADMIN — HYDROMORPHONE HYDROCHLORIDE 2 MG: 2 TABLET ORAL at 20:30

## 2020-02-13 RX ADMIN — CITALOPRAM HYDROBROMIDE 20 MG: 20 TABLET ORAL at 09:16

## 2020-02-13 RX ADMIN — TRAZODONE HYDROCHLORIDE 100 MG: 100 TABLET ORAL at 20:30

## 2020-02-13 RX ADMIN — POTASSIUM CHLORIDE 40 MEQ: 1500 TABLET, EXTENDED RELEASE ORAL at 09:15

## 2020-02-13 RX ADMIN — BUPROPION HYDROCHLORIDE 150 MG: 150 TABLET, FILM COATED, EXTENDED RELEASE ORAL at 09:16

## 2020-02-13 RX ADMIN — GABAPENTIN 600 MG: 600 TABLET, FILM COATED ORAL at 20:31

## 2020-02-13 RX ADMIN — CALCIUM CARBONATE (ANTACID) CHEW TAB 500 MG 500 MG: 500 CHEW TAB at 13:23

## 2020-02-13 ASSESSMENT — ACTIVITIES OF DAILY LIVING (ADL)
ADLS_ACUITY_SCORE: 20
ADLS_ACUITY_SCORE: 17
ADLS_ACUITY_SCORE: 17
ADLS_ACUITY_SCORE: 20
ADLS_ACUITY_SCORE: 17
ADLS_ACUITY_SCORE: 20

## 2020-02-13 ASSESSMENT — MIFFLIN-ST. JEOR: SCORE: 1185.33

## 2020-02-13 NOTE — PROVIDER NOTIFICATION
"MD Notification    Notified Person: MD    Notified Person Name: Dr. Corbett    Notification Date/Time:2/13 0534    Notification Interaction: paged    Purpose of Notification: pt having \"heart burn\"     Orders Received:    Comments:    "

## 2020-02-13 NOTE — PLAN OF CARE
A&O x 4. Patient denies pain. VSS, on 1L NC. Up with SBA - weightbearing as tolerated on right leg. Tele: NSR.  Pt had a right foot reconstruction on 1/31 - 2 pin sites - sites WDL, CMS intact. Pt's pain is being managed by PO Dilaudid PRN. Pt's K came back from AM labs at 3.3 - the first 40 mEq of K were provided. Follow up K lab draw scheduled for 1240.  Plan to continue diuresing and to discharge pt in 1-2 days. Continue to Monitor.

## 2020-02-13 NOTE — PLAN OF CARE
OT/cardiac rehab:session held this pm as patient fatigued, presenting with low BP, awaiting CT. Nursing in agreement.  As pt's diagnosis modified and she is no longer considered to have CHF, OT POC to be modified with cardiac rehab goals being discontinued. Pt will be seen by general OT to address ADLs and functional mobility. Nursing in agreement

## 2020-02-13 NOTE — PROGRESS NOTES
[facility]  Cardiology Progress Note    Date of Service (when I saw the patient): 02/13/2020  Primary Cardiologist: Dr. Ugarte       Interval History:   She has no new concerns or questions.    ----------------------------------------------------------------------------------------    Assessment:  Kavitha Headley is a 74 year old female who was admitted on 2/11/2020 with worsening SOB in the setting of holding furosemide post ankle surgery.      Mild Acute on Chronic HFpEF  -- PTA regimen includes lasix 20 mg   -- Held diuretic since ankle surgery and developed mild CHF  -- Improved significantly with IV lasix 40 mg BID   -- Slightly hypovolemic today, requiring NS bolus    Moderately Severe Aortic Stenosis  -- mean gradient of 25 mmHg with a valve area of .98 cm  with peak transaortic velocity of 3.4 m/s in the setting of preserved LVEF; dimensionless index of 0.29  -- will need repeat echo in 6 mo as outpatient    Mild CAD  -- no angina    Mild Ascending Aorta Dilatation  -- 4.1 cm     SVT  Alcohol Dependence  Chronic mild Thrombocytopenia    ----------------------------------------------------------------------------------------    Plan:  -- Give 500 ml NS given hypovolemia  -- Hold pta Furosemide and resume it on 2/17  -- Cardiology follow up in 2 weeks  -- Follow up with Dr. Ugarte in 6 mo with repeat echo prior  -- OK to discharge to home from cardiology clinic     ----------------------------------------------------------------------------------------  Physical Exam   Temp: 98.3  F (36.8  C) Temp src: Oral BP: 96/66   Heart Rate: 60 Resp: 18 SpO2: 95 % O2 Device: None (Room air) Oxygen Delivery: 1 LPM  Vitals:    02/11/20 2200 02/12/20 0314 02/13/20 0510   Weight: 72.4 kg (159 lb 11.2 oz) 71 kg (156 lb 8 oz) 71 kg (156 lb 9.6 oz)     GEN:  NAD  HEENT: Mucous membranes moist.  NECK:  No JVD.  C/V:  Regular rate and rhythm, no murmur, rub or gallop.   RESP: CTA, marcela.   GI: Abdomen soft, nontender,  nondistended.    EXTREM: No LE edema.   NEURO: Alert and oriented, cooperative.   PSYCH: Normal affect.  SKIN: Warm and dry.   VASC: 2+ radial and dorsalis pedis pulses bilaterally.      Medications     - MEDICATION INSTRUCTIONS -         sodium chloride 0.9%  500 mL Intravenous Once     aspirin  81 mg Oral Daily     atenolol  25 mg Oral QAM     baclofen  10 mg Oral BID     buPROPion  150 mg Oral QAM     citalopram  20 mg Oral Daily     folic acid  1 mg Oral Daily     gabapentin  300 mg Oral QAM     gabapentin  600 mg Oral At Bedtime     hydrOXYzine  25 mg Oral QAM     hydrOXYzine  50 mg Oral At Bedtime     [Held by provider] lisinopril  10 mg Oral Daily     multivitamin w/minerals  1 tablet Oral Daily     polyethylene glycol  17 g Oral QPM     sodium chloride (PF)  3 mL Intracatheter Q8H     traZODone  100 mg Oral At Bedtime       Data   Reviewed.     Tele: SOWMYA Kinsey PA-C   2/13/2020  Pager: [phone]

## 2020-02-13 NOTE — CONSULTS
[facility]    Met with patient.     HF symptoms prior to admit:   SOB/GRULLON   Abdominal bloating   Mild orthopnea  Living situation:   indeoendent  Med set up:   Independent/reliable  Scale available at home:   yes  CM/HF education topics we reviewed:   Sodium - patient stating she cooks from fresh, but uses salt. She is very interested in using other fresh spices    Fluids   Daily weights   Symptoms of HF to report to [facility]    Education materials provided:   Business card w/[facility] contact information   HF stoplight tool   Guide to HF    Patient is currently well known to Dr Ugarte and Michael Webber so I did not schedule an additional appt in [facility]. If patient needs the extra support of [facility] in the future patient can be scheduled.  Specifically instructed patient to call clinic with weight gain >3lbs in 24 hours, or more than 5lbs in a week, worsening SOB/edema.   Future Appointments   Date Time Provider Department Center   2/26/2020  1:40 PM Nayeli Castorena PA-C [facility]   2/26/2020  3:00 PM [facility]   2/26/2020  3:50 PM Michael Kinsey PA-C [facility]         It was a pleasure meeting with her today.  Please call with questions.         Ophelia Short RN, BSN  [facility] RN Care Coordinator  [facility]  [phone]  [facility]: Cardiomyopathy, Optimization, Rehabilitation, Education

## 2020-02-13 NOTE — PROVIDER NOTIFICATION
MD Notification    Notified Person: MD    Notified Person Name: Dr. Corbett    Notification Date/Time: 2/13 1238 pm    Notification Interaction: talked yo MD    Purpose of Notification: Pt feels more SOB today than yesterday. BP's now 80/50- does state slightly dizzy, HR. O2 87-90 RA.     Orders Received: IV Albumin, only give 250cc bolus not 500cc. CT chest ordered.     Comments:

## 2020-02-13 NOTE — PROVIDER NOTIFICATION
MD Notification    Notified Person: MD    Notified Person Name: dr Ugarte    Notification Date/Time:2/13/20 1220 pm    Notification Interaction:paged    Purpose of Notification: pt BP low 80/50, is slightly dizzy, feels SOB. O2 87-90 RA    Orders Received: Continue to give IV blous. Per MD feels this is not heart failure, may be infection or need to look further, PE?    Comments: Dr. Corbett called, aware. See orders

## 2020-02-13 NOTE — PROGRESS NOTES
[facility]    Hospitalist Progress Note    Brief Summary:  Kavitha Headley is a 74 year old female who presents with shortness of breath.  She has a past medical history remarkable for coronary disease which was minimal seen on angiogram in 2015, moderate to severe aortic stenosis, aortic root dilatation, recent right foot surgery/reconstruction, depression, hypertension, hyperlipidemia, chronic pain, alcohol use disorder in remission.  She was doing well and recently had reconstructive surgery in her right foot approximate 1 week ago.   Since her surgery she is not taking her Lasix and using ibuprofen on a daily basis.       Assessment & Plan     Acute on chronic diastolic congestive heart failure  H/o CAD, minimal disease on angiogram 2015  Moderate to severe aortic stenosis  Aortic root dilatation  PTA atenolol 25 mg daily, furosemide 20 mg daily, ASA 81 mg daily  Pt denies h/o CHF. Echo 11/2018 EF 60-65%, early diastolic dysfunction. [facility] Has held furosemide since surgery 2/2 miscommunication and using NSAIDs regularly.  2-3 days prior to presentation noted increasing SOB, mariya with exertion and progressed to SOB at rest and lower extremity edema.. Minimal chest tightness.  Start IV Lasix 40 mg twice daily, she responded very well to the treatment.  Lower extremity edema improved her shortness of breath is improved as well.  Continue to hold NSAIDs.  Repeat echocardiogram shows moderate to severe aortic stenosis with mean gradient of 33 mmHg and aortic valve area 0.75 cm .  EF of 55 to 60%.    At this time I think we overdiuresis the patient.  She is diuretic and slightly volume depleted especially with aortic stenosis.  Need to be very careful diuresis.  Agree with holding the diuresis and I will hold the lisinopril this morning as well.  Give a bolus of 250 mL of normal saline, IV albumin 5% 25 g.  CT scan of the chest without contrast rule out any other abnormality  including pleural effusion, atelectasis versus pneumonia.  No sign of infection at this time.    Appreciate input from the cardiology, needs every 6 months echocardiogram for her aortic stenosis.    Acute renal failure  Patient creatinine increased to 1.5, I think this is most likely secondary to overdiuresis being on lisinopril and hypotensive.  At this time and need to avoid hypotension, hold lisinopril normal saline bolus and IV albumin.  Recheck creatinine in the morning.       R foot surgery  S/p recent R foot reconstruction.   - continue prn hydromorphone  - hold ibuprofen with diuresis     Depression  PTA prn trazodone 100 mg prn, hydroxyzine 25 mg am/ 50 mg at HS, wellbutrin 150 mg in am, celexa 20 mg daily  - continue meds     HLD  HTN  PTA atenolol 25 mg daily  States was previously on cholesterol meds but stopped (no intolerance).   - check lipids  - continue atenolol     Chronic pain  PTA baclofen 10 mg BID, gabapentin 300 TID  - continue     H/o alcohol use disorder, in remission since 10/2019  Commended accomplishment     Ground glass nodule RUL  Noted on CT, radiology recommended pulmonary consult     At this time hold lisinopril and IV Lasix at this time.  IV fluids to 250 bolus and IV albumin.  The blood pressure remain on the lower side may start her on gentle IV hydration.  Discussed with cardiology and nursing staff to give the patient.  Most likely a pleural effusion and atelectasis.  Need incentive spirometry and flutter.     DVT Prophylaxis: Pneumatic Compression Devices  Code Status: Full Code    Disposition: Expected discharge in 1 to 2 days once remains stable.     Latrell Corbett MD  Text Page  (7am - 6pm)    Interval History   Earlier this morning when I visited the patient was feeling better denies any chest pain, but her blood pressure was on lower side.  Later in the day feeling slightly more short of breath continue to be hypertensive.  And had heartburn.  No fever no chills no cough no  abdominal pain back pain dysuria major constipation diarrhea at this time.    No other significant event overnight    -Data reviewed today: I reviewed all new labs and imaging results over the last 24 hours. I personally reviewed no images or EKG's today.    Physical Exam   Temp: 98.3  F (36.8  C) Temp src: Oral BP: (!) 82/54 Pulse: 59 Heart Rate: 60 Resp: 18 SpO2: 94 % O2 Device: Nasal cannula Oxygen Delivery: 2 LPM  Vitals:    02/11/20 2200 02/12/20 0314 02/13/20 0510   Weight: 72.4 kg (159 lb 11.2 oz) 71 kg (156 lb 8 oz) 71 kg (156 lb 9.6 oz)     Vital Signs with Ranges  Temp:  [97.7  F (36.5  C)-98.3  F (36.8  C)] 98.3  F (36.8  C)  Pulse:  [56-73] 59  Heart Rate:  [53-60] 60  Resp:  [16-18] 18  BP: ()/(44-67) 82/54  SpO2:  [88 %-98 %] 94 %  I/O last 3 completed shifts:  In: 240 [P.O.:240]  Out: 750 [Urine:750]    Constitutional: awake, alert, cooperative, no apparent distress, and appears stated age  Eyes: Lids and lashes normal, pupils equal, round and reactive to light, extra ocular muscles intact, sclera clear, conjunctiva normal  Respiratory: Bilateral basal crackles, improved after coughing.  Cardiovascular: Normal apical impulse, regular rate and rhythm, normal S1 and S2, no S3 or S4, and systolic murmur noted  GI: No scars, normal bowel sounds, soft, non-distended, non-tender, no masses palpated, no hepatosplenomegally  Skin: no bruising or bleeding  Musculoskeletal: no lower extremity pitting edema present  Neurologic: No focal deficit    Medications     - MEDICATION INSTRUCTIONS -         sodium chloride 0.9%  250 mL Intravenous Once     aspirin  81 mg Oral Daily     atenolol  25 mg Oral QAM     baclofen  10 mg Oral BID     buPROPion  150 mg Oral QAM     citalopram  20 mg Oral Daily     folic acid  1 mg Oral Daily     gabapentin  300 mg Oral QAM     gabapentin  600 mg Oral At Bedtime     hydrOXYzine  25 mg Oral QAM     hydrOXYzine  50 mg Oral At Bedtime     [Held by provider] lisinopril  10 mg  Oral Daily     multivitamin w/minerals  1 tablet Oral Daily     pantoprazole  40 mg Oral QAM AC     polyethylene glycol  17 g Oral QPM     sodium chloride (PF)  3 mL Intracatheter Q8H     traZODone  100 mg Oral At Bedtime       Data   Recent Labs   Lab 02/13/20  0554 02/12/20  0534 02/12/20  0049   WBC 6.0 7.1  --    HGB 10.0* 10.5*  --    MCV 95 94  --     322  --     137  --    POTASSIUM 3.3* 3.6  --    CHLORIDE 98 98  --    CO2 39* 34*  --    BUN 18 12  --    CR 1.51* 0.87  --    ANIONGAP 2* 5  --    BIRGIT 8.9 9.6  --    GLC 87 93  --    ALBUMIN 2.9*  --   --    TROPI  --  <0.015 <0.015       No results found for this or any previous visit (from the past 24 hour(s)).

## 2020-02-14 LAB
ALBUMIN SERPL-MCNC: 3.2 G/DL (ref 3.4–5)
ANION GAP SERPL CALCULATED.3IONS-SCNC: 3 MMOL/L (ref 3–14)
BASOPHILS # BLD AUTO: 0 10E9/L (ref 0–0.2)
BASOPHILS NFR BLD AUTO: 0.2 %
BUN SERPL-MCNC: 23 MG/DL (ref 7–30)
CALCIUM SERPL-MCNC: 8.6 MG/DL (ref 8.5–10.1)
CHLORIDE SERPL-SCNC: 97 MMOL/L (ref 94–109)
CO2 SERPL-SCNC: 34 MMOL/L (ref 20–32)
CREAT SERPL-MCNC: 1.68 MG/DL (ref 0.52–1.04)
DIFFERENTIAL METHOD BLD: ABNORMAL
EOSINOPHIL # BLD AUTO: 0.4 10E9/L (ref 0–0.7)
EOSINOPHIL NFR BLD AUTO: 6.6 %
ERYTHROCYTE [DISTWIDTH] IN BLOOD BY AUTOMATED COUNT: 15.3 % (ref 10–15)
GFR SERPL CREATININE-BSD FRML MDRD: 29 ML/MIN/{1.73_M2}
GLUCOSE SERPL-MCNC: 90 MG/DL (ref 70–99)
HCT VFR BLD AUTO: 30.8 % (ref 35–47)
HGB BLD-MCNC: 9.9 G/DL (ref 11.7–15.7)
IMM GRANULOCYTES # BLD: 0 10E9/L (ref 0–0.4)
IMM GRANULOCYTES NFR BLD: 0 %
LYMPHOCYTES # BLD AUTO: 2.3 10E9/L (ref 0.8–5.3)
LYMPHOCYTES NFR BLD AUTO: 40.7 %
MCH RBC QN AUTO: 30.9 PG (ref 26.5–33)
MCHC RBC AUTO-ENTMCNC: 32.1 G/DL (ref 31.5–36.5)
MCV RBC AUTO: 96 FL (ref 78–100)
MONOCYTES # BLD AUTO: 0.9 10E9/L (ref 0–1.3)
MONOCYTES NFR BLD AUTO: 16.2 %
NEUTROPHILS # BLD AUTO: 2.1 10E9/L (ref 1.6–8.3)
NEUTROPHILS NFR BLD AUTO: 36.3 %
NRBC # BLD AUTO: 0 10*3/UL
NRBC BLD AUTO-RTO: 0 /100
PHOSPHATE SERPL-MCNC: 4.2 MG/DL (ref 2.5–4.5)
PLATELET # BLD AUTO: 253 10E9/L (ref 150–450)
POTASSIUM SERPL-SCNC: 4.1 MMOL/L (ref 3.4–5.3)
RBC # BLD AUTO: 3.2 10E12/L (ref 3.8–5.2)
SODIUM SERPL-SCNC: 134 MMOL/L (ref 133–144)
WBC # BLD AUTO: 5.6 10E9/L (ref 4–11)

## 2020-02-14 PROCEDURE — 25000132 ZZH RX MED GY IP 250 OP 250 PS 637: Performed by: INTERNAL MEDICINE

## 2020-02-14 PROCEDURE — 21000001 ZZH R&B HEART CARE

## 2020-02-14 PROCEDURE — 85025 COMPLETE CBC W/AUTO DIFF WBC: CPT | Performed by: INTERNAL MEDICINE

## 2020-02-14 PROCEDURE — 80069 RENAL FUNCTION PANEL: CPT | Performed by: INTERNAL MEDICINE

## 2020-02-14 PROCEDURE — 99207 ZZC NON-BILLABLE SERV PER CHARTING: CPT | Performed by: INTERNAL MEDICINE

## 2020-02-14 PROCEDURE — 99232 SBSQ HOSP IP/OBS MODERATE 35: CPT | Performed by: INTERNAL MEDICINE

## 2020-02-14 PROCEDURE — 25800030 ZZH RX IP 258 OP 636: Performed by: INTERNAL MEDICINE

## 2020-02-14 PROCEDURE — 36415 COLL VENOUS BLD VENIPUNCTURE: CPT | Performed by: INTERNAL MEDICINE

## 2020-02-14 RX ORDER — SODIUM CHLORIDE 9 MG/ML
INJECTION, SOLUTION INTRAVENOUS CONTINUOUS
Status: DISCONTINUED | OUTPATIENT
Start: 2020-02-14 | End: 2020-02-14

## 2020-02-14 RX ADMIN — HYDROMORPHONE HYDROCHLORIDE 2 MG: 2 TABLET ORAL at 20:38

## 2020-02-14 RX ADMIN — HYDROXYZINE HYDROCHLORIDE 50 MG: 25 TABLET, FILM COATED ORAL at 21:47

## 2020-02-14 RX ADMIN — ATENOLOL 25 MG: 25 TABLET ORAL at 09:02

## 2020-02-14 RX ADMIN — HYDROMORPHONE HYDROCHLORIDE 2 MG: 2 TABLET ORAL at 09:08

## 2020-02-14 RX ADMIN — BUPROPION HYDROCHLORIDE 150 MG: 150 TABLET, FILM COATED, EXTENDED RELEASE ORAL at 09:02

## 2020-02-14 RX ADMIN — GABAPENTIN 600 MG: 600 TABLET, FILM COATED ORAL at 21:47

## 2020-02-14 RX ADMIN — POLYETHYLENE GLYCOL 3350 17 G: 17 POWDER, FOR SOLUTION ORAL at 11:41

## 2020-02-14 RX ADMIN — FOLIC ACID 1 MG: 1 TABLET ORAL at 09:02

## 2020-02-14 RX ADMIN — ASPIRIN 81 MG: 81 TABLET, DELAYED RELEASE ORAL at 09:01

## 2020-02-14 RX ADMIN — SENNOSIDES AND DOCUSATE SODIUM 2 TABLET: 8.6; 5 TABLET ORAL at 09:09

## 2020-02-14 RX ADMIN — Medication 10 MG: at 20:44

## 2020-02-14 RX ADMIN — PANTOPRAZOLE SODIUM 40 MG: 40 TABLET, DELAYED RELEASE ORAL at 09:01

## 2020-02-14 RX ADMIN — HYDROXYZINE HYDROCHLORIDE 25 MG: 25 TABLET, FILM COATED ORAL at 09:02

## 2020-02-14 RX ADMIN — MULTIPLE VITAMINS W/ MINERALS TAB 1 TABLET: TAB at 09:01

## 2020-02-14 RX ADMIN — TRAZODONE HYDROCHLORIDE 100 MG: 100 TABLET ORAL at 21:47

## 2020-02-14 RX ADMIN — CITALOPRAM HYDROBROMIDE 20 MG: 20 TABLET ORAL at 09:01

## 2020-02-14 RX ADMIN — Medication 1 MG: at 21:47

## 2020-02-14 RX ADMIN — SODIUM CHLORIDE: 9 INJECTION, SOLUTION INTRAVENOUS at 10:18

## 2020-02-14 RX ADMIN — GABAPENTIN 300 MG: 300 CAPSULE ORAL at 09:01

## 2020-02-14 ASSESSMENT — ACTIVITIES OF DAILY LIVING (ADL)
ADLS_ACUITY_SCORE: 17

## 2020-02-14 ASSESSMENT — MIFFLIN-ST. JEOR
SCORE: 1211.64
SCORE: 1209.37

## 2020-02-14 NOTE — PROGRESS NOTES
[facility]    Hospitalist Progress Note    Brief Summary:  Kavitha Headley is a 74 year old female who presents with shortness of breath.  She has a past medical history remarkable for coronary disease which was minimal seen on angiogram in 2015, moderate to severe aortic stenosis, aortic root dilatation, recent right foot surgery/reconstruction, depression, hypertension, hyperlipidemia, chronic pain, alcohol use disorder in remission.  She was doing well and recently had reconstructive surgery in her right foot approximate 1 week ago.   Since her surgery she is not taking her Lasix and using ibuprofen on a daily basis.       Assessment & Plan     Acute on chronic diastolic congestive heart failure  H/o CAD, minimal disease on angiogram 2015  Moderate to severe aortic stenosis  Aortic root dilatation  PTA atenolol 25 mg daily, furosemide 20 mg daily, ASA 81 mg daily  Pt denies h/o CHF. Echo 11/2018 EF 60-65%, early diastolic dysfunction. With surgery 1 week ago at [facility]. Has held furosemide since surgery 2/2 miscommunication and using NSAIDs regularly.  2-3 days prior to presentation noted increasing SOB, mariya with exertion and progressed to SOB at rest and lower extremity edema.. Minimal chest tightness.  Start IV Lasix 40 mg twice daily, she responded very well to the treatment.  Lower extremity edema improved her shortness of breath is improved as well.  Continue to hold NSAIDs.  Repeat echocardiogram shows moderate to severe aortic stenosis with mean gradient of 33 mmHg and aortic valve area 0.75 cm .  EF of 55 to 60%.    At this time I think we overdiuresis the patient.  She is diuretic and slightly volume depleted especially with aortic stenosis.  Need to be very careful diuresis.  Agree with holding the diuresis and I will hold the lisinopril this morning as well.  Give a bolus of 250 mL of normal saline, IV albumin 5% 25 g.  CT scan of the chest without contrast rule out any other abnormality  including pleural effusion, atelectasis versus pneumonia.  No sign of infection at this time.    Appreciate input from the cardiology, needs every 6 months echocardiogram for her aortic stenosis.  Her CHF is been resolved at this time.,  Currently on room air and not short of breath.  No crackles on examination today.    Acute renal failure: Most likely prerenal    Patient creatinine increased to 1.5, I think this is most likely secondary to overdiuresis being on lisinopril and hypotensive.  Slightly worse today because of low blood pressure on 2/13/2020.  I will start her on gentle IV hydration with IV normal saline 75 mill per hour.  Discontinue lisinopril as she does not needed.  Recheck creatinine in the morning.  If is trending down will be able to discharge the patient home.       R foot surgery  S/p recent R foot reconstruction.   - continue prn hydromorphone  -Recommend stopping th ibuprofen.     Depression  PTA prn trazodone 100 mg prn, hydroxyzine 25 mg am/ 50 mg at HS, wellbutrin 150 mg in am, celexa 20 mg daily  - continue meds     HLD  HTN  PTA atenolol 25 mg daily  States was previously on cholesterol meds but stopped (no intolerance).   - check lipids  - continue atenolol     Chronic pain  PTA baclofen 10 mg BID, gabapentin 300 TID  - continue     H/o alcohol use disorder, in remission since 10/2019  Commended accomplishment     Ground glass nodule RUL  Noted on CT, radiology recommended pulmonary consult     At this time continue to hold Lasix, and stop lisinopril.  Start her on gentle IV hydration with normal saline at 75 mill per hour.  Recheck creatinine in the morning.  Continue incentive spirometry and flutter  IV fluids to 250 bolus and IV albumin.  Overall improved at this time clinically.  But creatinine slightly worse.  Most likely will improve by tomorrow.     DVT Prophylaxis: Pneumatic Compression Devices  Code Status: Full Code    Disposition: Expected discharge possibly tomorrow  depending on creatinine    Latrell Corbett MD  Text Page  (7am - 6pm)    Interval History   Feeling much better this morning with regard to her improved shortness of breath.  Denies any fever chills nausea vomiting headache dizziness lightheadedness no abdominal pain back pain dysuria major constipation diarrhea at this time.    No other significant event overnight    -Data reviewed today: I reviewed all new labs and imaging results over the last 24 hours. I personally reviewed no images or EKG's today.    Physical Exam   Temp: 98.3  F (36.8  C) Temp src: Oral BP: 107/65 Pulse: 80 Heart Rate: 73 Resp: 16 SpO2: 96 % O2 Device: Nasal cannula Oxygen Delivery: 1 LPM  Vitals:    02/12/20 0314 02/13/20 0510 02/14/20 0501   Weight: 71 kg (156 lb 8 oz) 71 kg (156 lb 9.6 oz) 73.7 kg (162 lb 6.4 oz)     Vital Signs with Ranges  Temp:  [97.8  F (36.6  C)-98.3  F (36.8  C)] 98.3  F (36.8  C)  Pulse:  [56-80] 80  Heart Rate:  [54-73] 73  Resp:  [16-18] 16  BP: ()/(44-73) 107/65  SpO2:  [88 %-96 %] 96 %  I/O last 3 completed shifts:  In: 600 [P.O.:600]  Out: 300 [Urine:300]    Constitutional: awake, alert, cooperative, no apparent distress, and appears stated age  Eyes: Lids and lashes normal, pupils equal, round and reactive to light, extra ocular muscles intact, sclera clear, conjunctiva normal  Respiratory: Good air entry bilaterally, no crackles.  Cardiovascular: Normal apical impulse, regular rate and rhythm, normal S1 and S2, no S3 or S4, and systolic murmur noted  GI: No scars, normal bowel sounds, soft, non-distended, non-tender, no masses palpated, no hepatosplenomegally  Skin: no bruising or bleeding  Musculoskeletal: no lower extremity pitting edema present  Neurologic: No focal deficit    Medications     - MEDICATION INSTRUCTIONS -       sodium chloride 75 mL/hr at 02/14/20 1018       sodium chloride 0.9%  250 mL Intravenous Once     aspirin  81 mg Oral Daily     atenolol  25 mg Oral QAM     baclofen  10 mg Oral  BID     buPROPion  150 mg Oral QAM     citalopram  20 mg Oral Daily     folic acid  1 mg Oral Daily     gabapentin  300 mg Oral QAM     gabapentin  600 mg Oral At Bedtime     hydrOXYzine  25 mg Oral QAM     hydrOXYzine  50 mg Oral At Bedtime     multivitamin w/minerals  1 tablet Oral Daily     pantoprazole  40 mg Oral QAM AC     polyethylene glycol  17 g Oral QPM     sodium chloride (PF)  3 mL Intracatheter Q8H     traZODone  100 mg Oral At Bedtime       Data   Recent Labs   Lab 02/14/20  0554 02/13/20  1514 02/13/20  0554 02/12/20  0534  02/12/20  0049   WBC 5.6  --  6.0 7.1  --   --    HGB 9.9*  --  10.0* 10.5*  --   --    MCV 96  --  95 94  --   --      --  286 322  --   --      --  139 137  --   --    POTASSIUM 4.1 4.3 3.3* 3.6   < >  --    CHLORIDE 97  --  98 98  --   --    CO2 34*  --  39* 34*  --   --    BUN 23  --  18 12  --   --    CR 1.68*  --  1.51* 0.87  --   --    ANIONGAP 3  --  2* 5  --   --    BIRGIT 8.6  --  8.9 9.6  --   --    GLC 90  --  87 93  --   --    ALBUMIN 3.2*  --  2.9*  --   --   --    TROPI  --   --   --  <0.015  --  <0.015    < > = values in this interval not displayed.       Recent Results (from the past 24 hour(s))   CT Chest w/o Contrast    Narrative    CT CHEST WITHOUT CONTRAST  2/13/2020 6:02 PM    HISTORY: Shortness of breath, question pneumonia.      TECHNIQUE:  Scans obtained from the apices through the diaphragm  without IV contrast. Radiation dose for this scan was reduced using  automated exposure control, adjustment of the mA and/or kV according  to patient size, or iterative reconstruction technique.    COMPARISON:  CT chest 7/12/2019.    FINDINGS:  Lungs: New small right greater than left chest base pleural effusions.  New bilateral lower lobe atelectasis. Remainder of the lungs shows no  acute airspace disease.    Additional findings: Thoracic aortic and coronary artery  calcifications. Stable size of the ascending thoracic aorta measuring  4.2 cm series 2 image  23. There are a few mildly prominent lymph nodes  seen in the mediastinum, some larger compared to the prior exam. For  example, subcarinal lymph node has short axis of 1.1 cm, previously  0.6 cm series 2 image 24. There are other examples. Cardiomegaly again  noted. Upper abdomen images shows no new acute abnormality.  Cholecystectomy.      Impression    IMPRESSION:    1. New small right greater than left pleural fluid. Bibasilar  atelectasis.  2. A few mildly prominent lymph nodes are slightly larger compared to  the prior exam and are technically indeterminant.  3. Stable enlargement of the ascending thoracic aorta that is 4.2 cm.  Cardiomegaly again noted.    ORESTES BARRERA MD

## 2020-02-14 NOTE — PLAN OF CARE
A&O x 4. Patient's post surgical right foot pain is managed with PRN dilaudid. VSS, on 1L NC. BPs have been soft, SBP in the 90s. Up with SBA to BSC - minimal weight bearing on right foot. Tele: NSR. Creatinine bumped today (1.5) - holding lasix and lisinopril. Resume lasix 2/17. Pt has a cast on her right foot from a reconstructive surgery - pin sites are WDL, CMS intact. Plan for discharge in 1-2 days. Continue to Monitor.

## 2020-02-14 NOTE — PLAN OF CARE
OT: attempted, pt declined 2' having a conversation on the phone. Pt states she is ind w/ mobility to/from restroom, sinkside ADLs, and toileting and states she doesn't feel the need to practice and states her significant other will be able to help as needed w/ ADL's/IADL's. Pt states she would be willing to show therapist her ability to complete these tasks later in the day.

## 2020-02-15 VITALS
OXYGEN SATURATION: 90 % | TEMPERATURE: 98.4 F | WEIGHT: 161.9 LBS | SYSTOLIC BLOOD PRESSURE: 129 MMHG | BODY MASS INDEX: 27.64 KG/M2 | HEART RATE: 73 BPM | DIASTOLIC BLOOD PRESSURE: 92 MMHG | RESPIRATION RATE: 18 BRPM | HEIGHT: 64 IN

## 2020-02-15 LAB
ANION GAP SERPL CALCULATED.3IONS-SCNC: 2 MMOL/L (ref 3–14)
BUN SERPL-MCNC: 18 MG/DL (ref 7–30)
CALCIUM SERPL-MCNC: 9.2 MG/DL (ref 8.5–10.1)
CHLORIDE SERPL-SCNC: 106 MMOL/L (ref 94–109)
CO2 SERPL-SCNC: 34 MMOL/L (ref 20–32)
CREAT SERPL-MCNC: 0.97 MG/DL (ref 0.52–1.04)
GFR SERPL CREATININE-BSD FRML MDRD: 57 ML/MIN/{1.73_M2}
GLUCOSE SERPL-MCNC: 85 MG/DL (ref 70–99)
POTASSIUM SERPL-SCNC: 4.5 MMOL/L (ref 3.4–5.3)
SODIUM SERPL-SCNC: 142 MMOL/L (ref 133–144)

## 2020-02-15 PROCEDURE — 25000132 ZZH RX MED GY IP 250 OP 250 PS 637: Performed by: INTERNAL MEDICINE

## 2020-02-15 PROCEDURE — 99239 HOSP IP/OBS DSCHRG MGMT >30: CPT | Performed by: INTERNAL MEDICINE

## 2020-02-15 PROCEDURE — 80048 BASIC METABOLIC PNL TOTAL CA: CPT | Performed by: INTERNAL MEDICINE

## 2020-02-15 PROCEDURE — 25000128 H RX IP 250 OP 636: Performed by: INTERNAL MEDICINE

## 2020-02-15 PROCEDURE — 36415 COLL VENOUS BLD VENIPUNCTURE: CPT | Performed by: INTERNAL MEDICINE

## 2020-02-15 RX ORDER — FUROSEMIDE 10 MG/ML
20 INJECTION INTRAMUSCULAR; INTRAVENOUS ONCE
Status: COMPLETED | OUTPATIENT
Start: 2020-02-15 | End: 2020-02-15

## 2020-02-15 RX ORDER — HYDROMORPHONE HYDROCHLORIDE 2 MG/1
2 TABLET ORAL EVERY 6 HOURS PRN
Qty: 20 TABLET | Refills: 0 | Status: SHIPPED | OUTPATIENT
Start: 2020-02-15 | End: 2020-03-24

## 2020-02-15 RX ADMIN — FOLIC ACID 1 MG: 1 TABLET ORAL at 08:40

## 2020-02-15 RX ADMIN — BACLOFEN 10 MG: 10 TABLET ORAL at 00:37

## 2020-02-15 RX ADMIN — BUPROPION HYDROCHLORIDE 150 MG: 150 TABLET, FILM COATED, EXTENDED RELEASE ORAL at 08:41

## 2020-02-15 RX ADMIN — HYDROMORPHONE HYDROCHLORIDE 2 MG: 2 TABLET ORAL at 05:17

## 2020-02-15 RX ADMIN — ATENOLOL 25 MG: 25 TABLET ORAL at 08:40

## 2020-02-15 RX ADMIN — PANTOPRAZOLE SODIUM 40 MG: 40 TABLET, DELAYED RELEASE ORAL at 06:23

## 2020-02-15 RX ADMIN — FUROSEMIDE 20 MG: 10 INJECTION, SOLUTION INTRAMUSCULAR; INTRAVENOUS at 08:48

## 2020-02-15 RX ADMIN — HYDROXYZINE HYDROCHLORIDE 25 MG: 25 TABLET, FILM COATED ORAL at 08:40

## 2020-02-15 RX ADMIN — GABAPENTIN 300 MG: 300 CAPSULE ORAL at 08:41

## 2020-02-15 RX ADMIN — HYDROMORPHONE HYDROCHLORIDE 2 MG: 2 TABLET ORAL at 01:35

## 2020-02-15 RX ADMIN — CITALOPRAM HYDROBROMIDE 20 MG: 20 TABLET ORAL at 08:40

## 2020-02-15 RX ADMIN — ASPIRIN 81 MG: 81 TABLET, DELAYED RELEASE ORAL at 08:40

## 2020-02-15 RX ADMIN — MULTIPLE VITAMINS W/ MINERALS TAB 1 TABLET: TAB at 08:39

## 2020-02-15 ASSESSMENT — ACTIVITIES OF DAILY LIVING (ADL)
ADLS_ACUITY_SCORE: 16
ADLS_ACUITY_SCORE: 14
ADLS_ACUITY_SCORE: 16
ADLS_ACUITY_SCORE: 16

## 2020-02-15 NOTE — PROVIDER NOTIFICATION
MD Notification    Notified Person: MD    Notified Person Name: Samuel    Notification Date/Time: 2155 2/14/2020    Notification Interaction: web page    Purpose of Notification: patient with lung crackles. Here with CHF. Did have elevated creat today 1.68. Want to stop fluids?     Orders Received: IV fluids discontinued     Comments:

## 2020-02-15 NOTE — PLAN OF CARE
Occupational Therapy Discharge Summary    Reason for therapy discharge:    Discharged to home.    Progress towards therapy goal(s). See goals on Care Plan in [facility] electronic health record for goal details.  Goals not met.  Barriers to achieving goals:   discharge from facility.    Therapy recommendation(s):     Home with assist for I/ADLs as needed (including home management tasks, dressing, bathing)

## 2020-02-15 NOTE — PLAN OF CARE
VSS. Tele SR. Up with A1, walker. WBAT to R foot. C/o constipation. Gave Mirilax and senna with no relief. Pt requesting suppository next if she is unable to go this evening. NS gtt started.

## 2020-02-15 NOTE — PLAN OF CARE
Tele SR HR 60-70's. 1L NC. Unable to wean this shift. 89% on room air. Lungs with increasing crackles overnight. IV Fluids discontinued. Up with one assist and walker. Surgical boot to L foot. Pt had recent surgery to foot. WBAT. Elevated on femoral wedge for comfort. Constipation. Bisacodyl suppository given. Large BM results. Plan for today to recheck AM labs and determine if able to discharge home.           Heart Failure Care Pathway  GOALS TO BE MET BEFORE DISCHARGE:    1. Decrease congestion and/or edema with diuretic therapy to achieve near      optimal volume status.            Dyspnea improved:  Yes            Edema improved:     Yes        Net I/O and Weights since admission:          01/16 1500 - 02/15 1459  In: 2618 [P.O.:1760; I.V.:858]  Out: 4550 [Urine:4550]  Net: -1932            Vitals:    02/11/20 2200 02/12/20 0314 02/13/20 0510 02/14/20 0501   Weight: 72.4 kg (159 lb 11.2 oz) 71 kg (156 lb 8 oz) 71 kg (156 lb 9.6 oz) 73.7 kg (162 lb 6.4 oz)    02/14/20 2300   Weight: 73.4 kg (161 lb 14.4 oz)       2.  O2 sats > 92% on RA or at prior home O2 therapy level.          Current oxygenation status:       SpO2: 93 %         O2 Device: Nasal cannula,  Oxygen Delivery: 1 LPM         Able to wean O2 this shift to keep sats > 92%:  No, please explain: 1L NC needed       Does patient use Home O2? No    3.  Tolerates ambulation and mobility near baseline: Yes        How many times did the patient ambulate with nursing staff this shift? 1    Please review the Heart Failure Care Pathway for additional HF goal outcomes.    Marily Jones RN RN  2/15/2020

## 2020-02-15 NOTE — DISCHARGE INSTRUCTIONS
Having Cardiac Catheterization  You may have had chest pain (angina), dizziness, or other symptoms of heart trouble. To help diagnose your problem, your healthcare provider may advise a cardiac catheterization. This is a procedure that looks for a blockage or narrow area in the arteries around the heart. These can cause chest pain or a heart attack if not treated.  This common procedure may also be used to treat a heart problem. It may be done as a planned procedure if you have had chest pain in the past. Or it may be done right away to treat a suspected heart attack.     The catheter may be placed in the arm or the groin.   Before the procedure    Tell your healthcare team what medicines you take and about any allergies you have.    Don t eat or drink anything after midnight the night before the procedure, or as instructed by your healthcare team.  During the procedure    Hair may be trimmed where the catheter will be inserted.    You may be given medicine to relax before the procedure.    You will receive a local anesthetic to prevent pain at the insertion site.    A healthcare provider inserts a tube called a sheath into a blood vessel in your groin or arm.    Through the sheath, a long, thin tube called a catheter is placed inside the artery. The catheter is then guided toward your heart.    To do different tests or check other parts of the heart, the healthcare provider inserts a new catheter or moves the catheter or X-ray machine. For some tests, a contrast dye is injected through the catheter.  After the procedure    Your healthcare providers will tell you how long to lie down and keep the insertion site still.    If the insertion site was in your groin, you may need to lie down with your leg still for 2 or more hours. If a suture or closure device such as a collagen plug is used on the artery site to close the site, you may be able to move sooner. This depends on any bleeding that occurs.    A nurse will  check the insertion site and your blood pressure.    You may be asked to drink fluid to help flush the contrast liquid out of your system.    Have someone drive you home from the hospital.    It s normal to find a small bruise or lump at the insertion site. This should go away within a few weeks.  When to call your healthcare provider  Call your healthcare provider right away if you have any of the following:    Chest pain (angina)    Pain, swelling, redness, bleeding, or fluid leaking at the insertion site    Severe pain, coldness, or a bluish color in the leg or arm that held the catheter    Blood in your urine, black or sticky stools, or any other kind of bleeding    Fever of 100.4 F (38.0 C) or higher, or as advised by your healthcare provider   Date Last Reviewed: 11/1/2016 2000-2019 [employer]. [address] [place] [id number]. All rights reserved. This information is not intended as a substitute for professional medical care. Always follow your healthcare professional's instructions.

## 2020-02-15 NOTE — DISCHARGE SUMMARY
[facility]    Discharge Summary  Hospitalist    Date of Admission:  2/11/2020  Date of Discharge:  2/15/2020  Discharging Provider: Lartell Corbett MD  Date of Service (when I saw the patient): 02/15/20    Discharge Diagnoses   Please refer below    History of Present Illness      Kavitha Headley is an 74 year old female who presented with shortness of breath    Hospital Course   Kavitha Headley is a 74 year old female who presents with shortness of breath.  She has a past medical history remarkable for coronary disease which was minimal seen on angiogram in 2015, moderate to severe aortic stenosis, aortic root dilatation, recent right foot surgery/reconstruction, depression, hypertension, hyperlipidemia, chronic pain, alcohol use disorder in remission.  She was doing well and recently had reconstructive surgery in her right foot approximate 1 week ago.   Since her surgery she is not taking her Lasix and using ibuprofen on a daily basis.           Final discharge diagnoses and hospital course        Acute on chronic diastolic congestive heart failure  H/o CAD, minimal disease on angiogram 2015  Moderate to severe aortic stenosis  Aortic root dilatation  PTA atenolol 25 mg daily, furosemide 20 mg daily, ASA 81 mg daily  Pt denies h/o CHF. Echo 11/2018 EF 60-65%, early diastolic dysfunction. With surgery 1 week ago at [facility]. Has held furosemide since surgery 2/2 miscommunication and using NSAIDs regularly.  2-3 days prior to presentation noted increasing SOB, mariya with exertion and progressed to SOB at rest and lower extremity edema.. Minimal chest tightness.  Start IV Lasix 40 mg twice daily, she responded very well to the treatment.  Lower extremity edema improved her shortness of breath is improved as well.  Continue to hold NSAIDs.  Repeat echocardiogram shows moderate to severe aortic stenosis with mean gradient of 33 mmHg and aortic valve area 0.75 cm .  EF of 55 to 60%.    While on IV diuresis, the  patient become hypotensive, and the creatinine increase up to 1.51.  She received mild IV hydration and IV albumin.  An IV Lasix was held.  Patient shortness of breath resolved, was weaned off from the oxygen.  And lower extremity edema resolved.  She was started on gentle IV hydration her creatinine increased to 1.68 and now come back to her baseline level of 0.97.  At this time the patient is doing well.  Denies any chest pain shortness of breath fever chills nausea vomiting abdominal pain dysuria major constipation diarrhea at this time.  We did a CT scan of the chest without contrast which was negative for any acute infiltrate or pneumonia, shows mild pleural effusion with atelectasis.  Started on incentive spirometry and flutter and was weaned off from the oxygen.    At this time her creatinine is improved, giving a dose of IV Lasix on the day of discharge and restart her Lasix 20 mg at home.  She is advised to not use NSAIDs and cut down fluid intake.  She will be discharged home in stable condition at this time.      Appreciate input from the cardiology, needs every 6 months echocardiogram for her aortic stenosis.  Her CHF is been resolved at this time.,  Currently on room air and not short of breath.  No crackles on examination today.     Acute renal failure: Most likely prerenal     Patient creatinine increased to 1.68, I think this is most likely secondary to overdiuresis being on lisinopril and hypotensive.   I started her on gentle IV hydration with IV normal saline 75 mill per hour.  Discontinue lisinopril as she does not needed.  Recheck creatinine today shows back to baseline 0.97 at this time.        R foot surgery  S/p recent R foot reconstruction.   - continue prn hydromorphone  -Recommend stopping th ibuprofen.  Refill of oral Dilaudid given at the time of discharge.  X 20 tablets no refill     Depression  PTA prn trazodone 100 mg prn, hydroxyzine 25 mg am/ 50 mg at HS, wellbutrin 150 mg in am,  celexa 20 mg daily  - continue meds     HLD  HTN  PTA atenolol 25 mg daily  States was previously on cholesterol meds but stopped (no intolerance).   - check lipids  - continue atenolol     Chronic pain  PTA baclofen 10 mg BID, gabapentin 300 TID  - continue     H/o alcohol use disorder, in remission since 10/2019  Commended accomplishment     Ground glass nodule RUL  Noted on CT, radiology recommended pulmonary consult     Discharged home in stable condition     DVT Prophylaxis: Pneumatic Compression Devices  Code Status: Full Code    Latrell Corbett MD, MD    Significant Results and Procedures       Pending Results   These results will be followed up by PCP  Unresulted Labs Ordered in the Past 30 Days of this Admission     No orders found from 1/12/2020 to 2/12/2020.          Code Status   Full Code       Primary Care Physician   Nayeli Castorena    Physical Exam   Temp: 98.4  F (36.9  C) Temp src: Oral BP: (!) 129/92 Pulse: 73 Heart Rate: 73 Resp: 18 SpO2: 90 % O2 Device: None (Room air) Oxygen Delivery: 1 LPM  Vitals:    02/13/20 0510 02/14/20 0501 02/14/20 2300   Weight: 71 kg (156 lb 9.6 oz) 73.7 kg (162 lb 6.4 oz) 73.4 kg (161 lb 14.4 oz)     Vital Signs with Ranges  Temp:  [97.9  F (36.6  C)-98.8  F (37.1  C)] 98.4  F (36.9  C)  Pulse:  [68-73] 73  Heart Rate:  [66-75] 73  Resp:  [16-18] 18  BP: (111-131)/(58-92) 129/92  SpO2:  [87 %-96 %] 90 %  I/O last 3 completed shifts:  In: 1578 [P.O.:720; I.V.:858]  Out: 1900 [Urine:1900]    Constitutional: awake, alert, cooperative, no apparent distress, and appears stated age  Eyes: Lids and lashes normal, pupils equal, round and reactive to light, extra ocular muscles intact, sclera clear, conjunctiva normal  Respiratory: No increased work of breathing, good air exchange, clear to auscultation bilaterally, no crackles or wheezing  Cardiovascular: Normal apical impulse, regular rate and rhythm, normal S1 and S2, no S3 or S4, and no murmur noted  GI: No scars, normal  bowel sounds, soft, non-distended, non-tender, no masses palpated, no hepatosplenomegally  Skin: no bruising or bleeding  Musculoskeletal: no lower extremity pitting edema present  Neurologic: No focal deficit    Discharge Disposition   Discharged to home  Condition at discharge: Stable    Consultations This Hospital Stay   CORE CLINIC EVALUATION IP CONSULT  OCCUPATIONAL THERAPY ADULT IP CONSULT  NUTRITION SERVICES ADULT IP CONSULT  CARE COORDINATOR IP CONSULT  SOCIAL WORK IP CONSULT  CARDIOLOGY IP CONSULT  PHYSICAL THERAPY ADULT IP CONSULT    Time Spent on this Encounter   Latrell HUANG MD, personally saw the patient today and spent greater than 30 minutes discharging this patient.    Discharge Orders      Basic metabolic panel     Discharge Order: F/U with Cardiac  TARUN      Follow-Up with Cardiologist      Follow-Up with Cardiologist      Reason for your hospital stay    CHF     Follow-up and recommended labs and tests     Follow up with primary care provider, Nayeli Castorena, within 7 days for hospital follow- up.  The following labs/tests are recommended: cbc and bmp.     Activity    Your activity upon discharge: activity as tolerated     Full Code     Echocardiogram Complete    Administration of IV contrast will be tailored to this examination per the appropriate written protocol listed in the Echocardiography department Protocol Book, or by the supervising Cardiologist. This may result in an order change.    Use of contrast is at the discretion of the supervising Cardiologist.     Echocardiogram Complete    Administration of IV contrast will be tailored to this examination per the appropriate written protocol listed in the Echocardiography department Protocol Book, or by the supervising Cardiologist. This may result in an order change.    Use of contrast is at the discretion of the supervising Cardiologist.     Diet    Follow this diet upon discharge: Orders Placed This Encounter      Regular Diet Adult      Discharge Medications   Current Discharge Medication List      CONTINUE these medications which have CHANGED    Details   HYDROmorphone (DILAUDID) 2 MG tablet Take 1 tablet (2 mg) by mouth every 6 hours as needed for moderate to severe pain  Qty: 20 tablet, Refills: 0    Associated Diagnoses: Hallux rigidus of right foot         CONTINUE these medications which have NOT CHANGED    Details   aspirin 81 MG EC tablet Take 81 mg by mouth daily      aspirin-acetaminophen-caffeine (EXCEDRIN MIGRAINE) 250-250-65 MG tablet Take 2 tablets by mouth daily as needed for headaches      atenolol (TENORMIN) 25 MG tablet Take 1 tablet (25 mg) by mouth every morning  Qty: 90 tablet, Refills: 1    Associated Diagnoses: Mild major depression (H); Anxiety and depression      baclofen (LIORESAL) 10 MG tablet Take 1 tablet (10 mg) by mouth 2 times daily  Qty: 180 tablet, Refills: 1    Associated Diagnoses: Chronic pain syndrome      BIOTIN PO Take 1 tablet by mouth every morning      buPROPion (WELLBUTRIN XL) 150 MG 24 hr tablet Take 1 tablet (150 mg) by mouth every morning  Qty: 90 tablet, Refills: 1    Associated Diagnoses: Mild major depression (H); Anxiety and depression      citalopram (CELEXA) 20 MG tablet Take 1 tablet (20 mg) by mouth daily  Qty: 90 tablet, Refills: 1    Associated Diagnoses: Mild major depression (H); Anxiety and depression      folic acid (FOLVITE) 1 MG tablet Take 1 mg by mouth daily      !! gabapentin (NEURONTIN) 300 MG capsule Take 300 mg by mouth every morning      !! gabapentin (NEURONTIN) 300 MG capsule Take 600 mg by mouth every evening      HUMIRA PEN-PS/UV/ADOL HS START 40 MG/0.8ML pen kit Inject 0.8 mLs (40 mg) Subcutaneous every 14 days  Refills: 0    Associated Diagnoses: Psoriasis      !! hydrOXYzine (ATARAX) 25 MG tablet Take 25 mg by mouth every morning TAKES IN ADDITION TO EVENING DOSE.      !! hydrOXYzine (ATARAX) 25 MG tablet Take 50 mg by mouth At Bedtime (25MG X 2 = 50MG)  TAKES IN  ADDITION TO MORNING DOSE.      polyethylene glycol (MIRALAX/GLYCOLAX) Packet Take 17 g by mouth daily as needed (constipation)  Qty: 7 packet    Associated Diagnoses: Constipation, unspecified constipation type      senna-docusate (SENOKOT-S;PERICOLACE) 8.6-50 MG per tablet Take 1-2 tablets by mouth 2 times daily as needed for constipation  Qty: 100 tablet    Associated Diagnoses: Constipation, unspecified constipation type      traZODone (DESYREL) 100 MG tablet Take 100 mg by mouth At Bedtime      TURMERIC PO Take 1 capsule by mouth every morning      vitamin C (ASCORBIC ACID) 250 MG tablet Take 250 mg by mouth daily      furosemide (LASIX) 20 MG tablet Take 1 tablet (20 mg) by mouth daily  Qty: 90 tablet, Refills: 0    Associated Diagnoses: Benign hypertension      Multiple Vitamins-Minerals (CENTRUM SILVER) per tablet Take 1 tablet by mouth daily      ondansetron (ZOFRAN-ODT) 4 MG ODT tab Take 1 tablet (4 mg) by mouth every 6 hours as needed for nausea or vomiting  Qty: 20 tablet, Refills: 0    Associated Diagnoses: Hallux rigidus of right foot      STATIN NOT PRESCRIBED, INTENTIONAL, Please choose reason not prescribed, below    Associated Diagnoses: Preventative health care       !! - Potential duplicate medications found. Please discuss with provider.      STOP taking these medications       aspirin (ASA) 81 MG tablet Comments:   Reason for Stopping:         ibuprofen (ADVIL/MOTRIN) 600 MG tablet Comments:   Reason for Stopping:             Allergies   Allergies   Allergen Reactions     Bactrim [Sulfamethoxazole W/Trimethoprim] Hives     Codeine Itching     NAUSEA     Morphine Itching     NAUSEA     Data   Most Recent 3 CBC's:  Recent Labs   Lab Test 02/14/20  0554 02/13/20  0554 02/12/20  0534   WBC 5.6 6.0 7.1   HGB 9.9* 10.0* 10.5*   MCV 96 95 94    286 322      Most Recent 3 BMP's:  Recent Labs   Lab Test 02/15/20  0615 02/14/20  0554 02/13/20  1514 02/13/20  0554    134  --  139   POTASSIUM  4.5 4.1 4.3 3.3*   CHLORIDE 106 97  --  98   CO2 34* 34*  --  39*   BUN 18 23  --  18   CR 0.97 1.68*  --  1.51*   ANIONGAP 2* 3  --  2*   BIRGIT 9.2 8.6  --  8.9   GLC 85 90  --  87     Most Recent 2 LFT's:  Recent Labs   Lab Test 01/28/20  1408 12/04/19  1541   AST 21 25   ALT 19 22   ALKPHOS 44 55   BILITOTAL 0.3 0.3     Most Recent INR's and Anticoagulation Dosing History:  Anticoagulation Dose History     Recent Dosing and Labs Latest Ref Rng & Units 11/7/2010 4/11/2012 9/29/2015 6/7/2017 6/10/2017 6/11/2017 6/16/2017    INR 0.86 - 1.14 0.89 0.93 0.86 1.02 1.19(H) 1.15(H) 1.03        Most Recent 3 Troponin's:  Recent Labs   Lab Test 02/12/20  0534 02/12/20  0049   TROPI <0.015 <0.015     Most Recent Cholesterol Panel:  Recent Labs   Lab Test 02/12/20  0534   CHOL 317*   *   HDL 81   TRIG 91     Most Recent 6 Bacteria Isolates From Any Culture (See EPIC Reports for Culture Details):  Recent Labs   Lab Test 06/06/17  1530 06/06/17  1457 04/30/14  1815 02/07/14  1445 04/11/12  1225 04/11/12  0603   CULT No growth No growth >100,000 colonies/mL Escherichia coli* >100,000 colonies/mL Mixed gram negative and positive johana Multiple species present, probable perineal contamination. Susceptibility testing not routinely done Heavy growth Candida albicans Plus Light growth Normal respiratory johana No growth after 6 days     Most Recent TSH, T4 and A1c Labs:  Recent Labs   Lab Test 08/04/18  0900   TSH 3.05     Results for orders placed or performed during the hospital encounter of 02/11/20   XR Chest 2 Views    Narrative    CHEST TWO VIEWS February 12, 2020 10:09 AM     HISTORY: 74-year-old woman with shortness of breath.       Impression    IMPRESSION: Since CT exam on July 12, 2019, heart size is normal.  Small bilateral pleural effusions. Slight bibasilar scattered  opacities suggestive of atelectasis. No pneumothorax.    ZEYNEP CASTRO MD   CT Chest w/o Contrast    Narrative    CT CHEST WITHOUT CONTRAST   2020 6:02 PM    HISTORY: Shortness of breath, question pneumonia.      TECHNIQUE:  Scans obtained from the apices through the diaphragm  without IV contrast. Radiation dose for this scan was reduced using  automated exposure control, adjustment of the mA and/or kV according  to patient size, or iterative reconstruction technique.    COMPARISON:  CT chest 2019.    FINDINGS:  Lungs: New small right greater than left chest base pleural effusions.  New bilateral lower lobe atelectasis. Remainder of the lungs shows no  acute airspace disease.    Additional findings: Thoracic aortic and coronary artery  calcifications. Stable size of the ascending thoracic aorta measuring  4.2 cm series 2 image 23. There are a few mildly prominent lymph nodes  seen in the mediastinum, some larger compared to the prior exam. For  example, subcarinal lymph node has short axis of 1.1 cm, previously  0.6 cm series 2 image 24. There are other examples. Cardiomegaly again  noted. Upper abdomen images shows no new acute abnormality.  Cholecystectomy.      Impression    IMPRESSION:    1. New small right greater than left pleural fluid. Bibasilar  atelectasis.  2. A few mildly prominent lymph nodes are slightly larger compared to  the prior exam and are technically indeterminant.  3. Stable enlargement of the ascending thoracic aorta that is 4.2 cm.  Cardiomegaly again noted.    ORESTES BARRERA MD   Echocardiogram Complete    Narrative    [id number]  [id number]  [id number]  [id number]^GELACIO^ROSA^MARCIA           [facility]  Echocardiography Laboratory  [address] [place] [address]        Name: DARWIN JASSO  MRN: [id number]  : 1945  Study Date: 2020 10:44 AM  Age: 74 yrs  Gender: Female  Patient Location: [facility]  Reason For Study: CHF  Ordering Physician: ROSA BRIZUELA  Referring Physician: Nayeli Castorena  Performed By: Melva Cotter     BSA: 1.8 m2  Height: 64 in  Weight: 159 lb  HR:  57  BP: 129/78 mmHg  _____________________________________________________________________________  __        Procedure  Complete Echo Adult. Optison (NDC #4186-3176) given intravenously.  _____________________________________________________________________________  __        Interpretation Summary        Left ventricular size, global systolic function, and wall motion are normal,  estimated LVEF 55-60%.  Right ventricular global function is normal.  Moderate to severe aortic stenosis, Vmax 3.8 m/s, mean gradient 33 mmHg, RENE  0.75cm2 (VTI), DI 0.21, SVi borderline normal 36.7cc/m2.  Ascending aorta is Mildly dilated. Max diameter of the visualized portion 4  cm.     This study was compared to a TTE from 7/2019. The severity of aortic stenosis  has progressed.  _____________________________________________________________________________  __        Left Ventricle  The left ventricle is normal in size. There is concentric remodeling present.  Left ventricular systolic function is normal. The visual ejection fraction is  estimated at 55-60%. Left ventricular diastolic function is indeterminate.  Normal left ventricular wall motion.     Right Ventricle  The right ventricle is mildly dilated. The right ventricular systolic function  is normal.     Atria  The left atrium is mildly dilated. Right atrial size is normal.     Mitral Valve  There is mild mitral annular calcification. The mitral valve leaflets appear  thickened, but open well. There is mild (1+) mitral regurgitation.        Tricuspid Valve  The tricuspid valve is not well visualized, but is grossly normal. There is  trace tricuspid regurgitation. The right ventricular systolic pressure is  approximated at 13.9 mmHg plus the right atrial pressure. Right ventricular  systolic pressure is normal.     Aortic Valve  The aortic valve is trileaflet. There is trace aortic regurgitation. Moderate  to severe valvular aortic stenosis. Moderate to severe aortic stenosis,  Vmax  3.8 m/s, mean gradient 33 mmHg, RENE 0.75cm2 (VTI), DI 0.21, SVi borderline  normal 36.7cc/m2.     Pulmonic Valve  The pulmonic valve is not well seen, but is grossly normal. This degree of  valvular regurgitation is within normal limits.     Vessels  The aortic root is normal size. The ascending aorta is Mildly dilated. Max  diameter of the visualized portion 4 cm. The inferior vena cava was normal in  size with preserved respiratory variability.     Pericardium  There is no pericardial effusion.     _____________________________________________________________________________  __  MMode/2D Measurements & Calculations  IVSd: 0.96 cm  LVIDd: 4.0 cm  LVIDs: 2.4 cm  LVPWd: 0.96 cm  FS: 39.5 %  LV mass(C)d: 121.1 grams  LV mass(C)dI: 68.3 grams/m2     Ao root diam: 2.8 cm  LA dimension: 4.1 cm  asc Aorta Diam: 4.0 cm  LA/Ao: 1.5  LVOT diam: 2.0 cm  LVOT area: 3.0 cm2  RWT: 0.48        Doppler Measurements & Calculations  MV E max enrique: 94.4 cm/sec  MV A max enrique: 83.6 cm/sec  MV E/A: 1.1  MV max P.6 mmHg  MV mean P.8 mmHg  MV V2 VTI: 37.6 cm  MVA(VTI): 1.7 cm2  MV P1/2t max enrique: 128.1 cm/sec  MV P1/2t: 125.1 msec  MVA(P1/2t): 1.8 cm2  MV dec slope: 300.0 cm/sec2  Ao V2 max: 375.1 cm/sec  Ao max P.0 mmHg  Ao V2 mean: 279.9 cm/sec  Ao mean P.3 mmHg  Ao V2 VTI: 86.7 cm  RENE(I,D): 0.75 cm2  RENE(V,D): 0.65 cm2  LV V1 max P.6 mmHg  LV V1 max: 80.1 cm/sec  LV V1 VTI: 21.4 cm  SV(LVOT): 65.2 ml  SI(LVOT): 36.7 ml/m2  PA acc time: 0.12 sec  TR max enrique: 186.2 cm/sec  TR max P.9 mmHg  AV Enrique Ratio (DI): 0.21  RENE Index (cm2/m2): 0.42     E/E' av.6  Lateral E/e': 19.5  Medial E/e': 23.6           _____________________________________________________________________________  __           Report approved by: Felipe Bateman 2020 01:09 PM        *Note: Due to a large number of results and/or encounters for the requested time period, some results have not been displayed. A complete set of  results can be found in Results Review.     Most Recent 3 CBC's:  Recent Labs   Lab Test 02/14/20  0554 02/13/20  0554 02/12/20  0534   WBC 5.6 6.0 7.1   HGB 9.9* 10.0* 10.5*   MCV 96 95 94    286 322     Most Recent 3 BMP's:  Recent Labs   Lab Test 02/15/20  0615 02/14/20  0554 02/13/20  1514 02/13/20  0554    134  --  139   POTASSIUM 4.5 4.1 4.3 3.3*   CHLORIDE 106 97  --  98   CO2 34* 34*  --  39*   BUN 18 23  --  18   CR 0.97 1.68*  --  1.51*   ANIONGAP 2* 3  --  2*   BIRGIT 9.2 8.6  --  8.9   GLC 85 90  --  87

## 2020-02-17 ENCOUNTER — TELEPHONE (OUTPATIENT)
Dept: CARDIOLOGY | Facility: CLINIC | Age: 75
End: 2020-02-17

## 2020-02-17 ENCOUNTER — TELEPHONE (OUTPATIENT)
Dept: FAMILY MEDICINE | Facility: CLINIC | Age: 75
End: 2020-02-17

## 2020-02-17 NOTE — TELEPHONE ENCOUNTER
Attempt # 1  Called #   Telephone Information:   Mobile [phone]     Unable to leave , mailbox is full.    Pankaj Deluca RN   [facility]

## 2020-02-17 NOTE — TELEPHONE ENCOUNTER
Patient discharged from [facility] for inpatient hospital stay on 2/15 for acute on chronic CHF, nonrheumatic aortic valve stenosis.    Please contact patient to follow up; appointment scheduled 2/26 with PATRICIA RUIZ.    ER / IP:  0/1    Care Coordination:  yeison Willis

## 2020-02-17 NOTE — TELEPHONE ENCOUNTER
Patient was evaluated by cardiology while inpatient for increased SOB after holding PTA Lasix post ankle surgery. IV lasix diuresed and pt to resume PTA lasix today-2/17/20. Writer attempted to call patient to discuss any post hospital d/c questions, review discharge medication, and confirm f/u appts, but no answer and VM box if full. Will try back another date. RN will confirm with patient that she has an apt scheduled with TARUN Michael Webber with labs prior on 2/26/20. Will remind patient to weigh self every AM, after waking and using the restroom, but before breakfast and medications. Call clinic for a weight gain of 2 lbs overnight or 5 lbs in a week. Low Na+ diet to be encouraged. Pt to be instructed to bring daily wt/BP diary and medications with to f/u KAYLA Hernandez RN.

## 2020-02-18 ENCOUNTER — TRANSFERRED RECORDS (OUTPATIENT)
Dept: HEALTH INFORMATION MANAGEMENT | Facility: CLINIC | Age: 75
End: 2020-02-18

## 2020-02-18 LAB — INTERPRETATION ECG - MUSE: NORMAL

## 2020-02-18 NOTE — TELEPHONE ENCOUNTER
"Hospital/TCU/ED for chronic condition Discharge Protocol    \"Hi, my name is Sara Heard RN, a registered nurse, and I am calling from [facility].  I am calling to follow up and see how things are going for you after your recent emergency visit/hospital/TCU stay.\"    Tell me how you are doing now that you are home?\" patient was seen by surgeon today and dressing changed      Discharge Instructions    \"Let's review your discharge instructions.  What is/are the follow-up recommendations?  Pt. Response: follow up with provider within 7 day    \"Has an appointment with your primary care provider been scheduled?\"   Yes. (confirm)    \"When you see the provider, I would recommend that you bring your medications with you.\"    Medications    \"Tell me what changed about your medicines when you discharged?\"    Changes to chronic meds?    0-1    \"What questions do you have about your medications?\"    None     New diagnoses of heart failure, COPD, diabetes, or MI?    No              Post Discharge Medication Reconciliation Status: discharge medications reconciled, continue medications without change.    Was MTM referral placed (*Make sure to put transitions as reason for referral)?   No    Call Summary    \"What questions or concerns do you have about your recent visit and your follow-up care?\"     none    \"If you have questions or things don't continue to improve, we encourage you contact us through the main clinic number (give number).  Even if the clinic is not open, triage nurses are available 24/7 to help you.     We would like you to know that our clinic has extended hours (provide information).  We also have urgent care (provide details on closest location and hours/contact info)\"      \"Thank you for your time and take care!\"         DRE Rocha, RN  [employer] [facility]      "

## 2020-02-18 NOTE — TELEPHONE ENCOUNTER
Writer returned pt's phone message. Pt denies any increased SOB, any chest pain, edema or lightheadedness. Denies any medication questions and states she did restart Lasix yesterday as instructed. Reviewed how to keep daily wt diary, wt parameters and instructed to bring daily wt diary and medications with her to f/u OV as scheduled and reviewed. Low Na+ diet encouraged.  Pt verbalized understanding without further questions. MANISH Hernandez RN.

## 2020-02-18 NOTE — TELEPHONE ENCOUNTER
ED / Discharge Outreach Protocol    Patient Contact    Attempt # 2    Was call answered?  No.  Left message on voicemail with information to call me back.    EMILIA RochaN, RN  [employer] [facility]

## 2020-02-19 LAB — INTERPRETATION ECG - MUSE: NORMAL

## 2020-02-22 DIAGNOSIS — F32.A ANXIETY AND DEPRESSION: Primary | ICD-10-CM

## 2020-02-22 DIAGNOSIS — F41.9 ANXIETY AND DEPRESSION: Primary | ICD-10-CM

## 2020-02-24 NOTE — TELEPHONE ENCOUNTER
"Requested Prescriptions   Pending Prescriptions Disp Refills     hydrOXYzine (ATARAX) 25 MG tablet [Pharmacy Med Name: HYDROXYZINE HCL 25 MG TABLET]        Last Written Prescription Date:  na  Last Fill Quantity: na,  # refills: na   Last office visit: 1/28/2020 with prescribing provider:  PATRICIA Jalloh         Future Office Visit:   Next 5 appointments (look out 90 days)    Feb 26, 2020  3:50 PM CST  Return Visit with Michael Kinsey PA-C  [facility] ([facility]) [address] [place] [id number]  [phone] OPT 2   Mar 10, 2020  1:40 PM CDT  Office Visit with Nayeli Castorena PA-C  [facility] ([facility]) [address] [place] [id number] [phone]            90 tablet 1     Sig: TAKE 1 TABLET (25 MG) BY MOUTH EVERY 6 HOURS AS NEEDED FOR ANXIETY       Antihistamines Protocol Passed - 2/24/2020  7:45 AM        Passed - Recent (12 mo) or future (30 days) visit within the authorizing provider's specialty     Patient has had an office visit with the authorizing provider or a provider within the authorizing providers department within the previous 12 mos or has a future within next 30 days. See \"Patient Info\" tab in inbasket, or \"Choose Columns\" in Meds & Orders section of the refill encounter.              Passed - Patient is age 3 or older     Apply age and/or weight-based dosing for peds patients age 3 and older.    Forward request to provider for patients under the age of 3.          Passed - Medication is active on med list        "

## 2020-02-25 RX ORDER — HYDROXYZINE HYDROCHLORIDE 25 MG/1
TABLET, FILM COATED ORAL
Qty: 90 TABLET | Refills: 0 | Status: SHIPPED | OUTPATIENT
Start: 2020-02-25 | End: 2020-03-10

## 2020-02-26 ENCOUNTER — OFFICE VISIT (OUTPATIENT)
Dept: CARDIOLOGY | Facility: CLINIC | Age: 75
End: 2020-02-26
Attending: PHYSICIAN ASSISTANT
Payer: COMMERCIAL

## 2020-02-26 VITALS
DIASTOLIC BLOOD PRESSURE: 67 MMHG | HEIGHT: 64 IN | BODY MASS INDEX: 25.25 KG/M2 | SYSTOLIC BLOOD PRESSURE: 97 MMHG | HEART RATE: 69 BPM | WEIGHT: 147.9 LBS

## 2020-02-26 DIAGNOSIS — I77.810 MILD ASCENDING AORTA DILATATION (H): ICD-10-CM

## 2020-02-26 DIAGNOSIS — I50.9 ACUTE ON CHRONIC CONGESTIVE HEART FAILURE, UNSPECIFIED HEART FAILURE TYPE (H): ICD-10-CM

## 2020-02-26 DIAGNOSIS — I35.0 AORTIC VALVE STENOSIS, ETIOLOGY OF CARDIAC VALVE DISEASE UNSPECIFIED: Primary | ICD-10-CM

## 2020-02-26 DIAGNOSIS — I50.32 CHRONIC DIASTOLIC CONGESTIVE HEART FAILURE (H): ICD-10-CM

## 2020-02-26 LAB
ANION GAP SERPL CALCULATED.3IONS-SCNC: 13.5 MMOL/L (ref 6–17)
BUN SERPL-MCNC: 18 MG/DL (ref 7–30)
CALCIUM SERPL-MCNC: 10 MG/DL (ref 8.5–10.5)
CHLORIDE SERPL-SCNC: 97 MMOL/L (ref 98–107)
CO2 SERPL-SCNC: 31 MMOL/L (ref 23–29)
CREAT SERPL-MCNC: 1.5 MG/DL (ref 0.7–1.3)
GFR SERPL CREATININE-BSD FRML MDRD: 34 ML/MIN/{1.73_M2}
GLUCOSE SERPL-MCNC: 105 MG/DL (ref 70–105)
POTASSIUM SERPL-SCNC: 3.5 MMOL/L (ref 3.5–5.1)
SODIUM SERPL-SCNC: 138 MMOL/L (ref 136–145)

## 2020-02-26 PROCEDURE — 99214 OFFICE O/P EST MOD 30 MIN: CPT | Performed by: PHYSICIAN ASSISTANT

## 2020-02-26 PROCEDURE — 80048 BASIC METABOLIC PNL TOTAL CA: CPT | Performed by: PHYSICIAN ASSISTANT

## 2020-02-26 PROCEDURE — 36415 COLL VENOUS BLD VENIPUNCTURE: CPT | Performed by: PHYSICIAN ASSISTANT

## 2020-02-26 RX ORDER — FUROSEMIDE 20 MG
TABLET ORAL
Qty: 90 TABLET | Refills: 0 | COMMUNITY
Start: 2020-02-26 | End: 2020-03-21

## 2020-02-26 ASSESSMENT — MIFFLIN-ST. JEOR: SCORE: 1145.87

## 2020-02-26 NOTE — LETTER
2/26/2020    Nayeli Castorena PA-C  [address] [facility] [address]    RE: Kavitha Headley       Dear Colleague,    I had the pleasure of seeing Kavitha Headley in the [facility].    Primary Cardiologist: Dr. Ugarte    Reason for Visit: Hospital Follow up    History of Present Illness:   This is a very pleasant 74-year-old female with past medical history notable for moderately severe aortic valve stenosis (mean gradient of 25 mmHg with a valve area of .98 cm  with peak transaortic velocity of 3.4 m/s in the setting of preserved LVEF; dimensionless index of 0.29), mild ascending aortic dilatation, alcohol dependence (drinks 3 to 4 glasses of hard liquor every day; she was referred to chemical dependency recently by her primary care provider), history of SVT, minimal nonobstructive CAD, and chronic mild thrombocytopenia.    She was last seen in cardiology clinic by Dr. Ugarte in 12/2018 and at that time she was recommended to follow-up with us in 4 to 5 months for consideration of transesophageal echocardiogram to relook at her aortic valve as well as CT angiogram for remeasurement of her known mild ascending aorta.  She was also initiated on low-dose atenolol.    CTA confirms that she does have mild a sending aorta dilatation measuring 4.1 cm.  This correlates with her echocardiogram findings.  We will continue with serial echocardiograms for further monitoring of this.    ALEJANDRA was completed and this showed moderate aortic valve stenosis with mean gradient of 33 mmHg and peak velocity of 3.2 m/sec. RENE was 1.1 cm2. She has preserved biventricular function. This was reviewed with Dr. Ugarte who recommended monitoring.     She unfortunately was admitted earlier this month with SOB in the setting of holding her furosemide after foot surgery. She required short course of IV diuresis and was later discharged to home stable condition. She was recommended to have repeat echo in 6  months for evaluation of her aortic valve.     She returns to clinic today, stating she continues to have no significant symptoms.  She denies chest discomfort, shortness of breath, presyncope, or syncope.  She does notice some exercise intolerance.     Assessment and Plan:   This is a very pleasant 74-year-old female with past medical history notable for moderately severe aortic valve stenosis (mean gradient of 25 mmHg with a valve area of .98 cm  with peak transaortic velocity of 3.4 m/s in the setting of preserved LVEF; dimensionless index of 0.29), mild ascending aortic dilatation, alcohol dependence (drinks 3 to 4 glasses of hard liquor every day; she was referred to chemical dependency recently by her primary care provider), history of SVT, minimal nonobstructive CAD, and chronic mild thrombocytopenia.    Her blood pressure is low today and renal function is worse. I have asked her to decrease furosemide to one tablet every other day. She will return in a few weeks with repeat BMP beforehand. We will have her see Dr. Ugarte in 6 months with repeat echocardiogram.     Thank you for allowing me to participate in the care of this pleasant patient today.      This note was completed in part using Dragon voice recognition software. Although reviewed after completion, some word and grammatical errors may occur.    Orders this Visit:  Orders Placed This Encounter   Procedures     Basic metabolic panel     Follow-Up with Cardiac Advanced Practice Provider     Orders Placed This Encounter   Medications     furosemide (LASIX) 20 MG tablet     Sig: One tablet every other day.     Dispense:  90 tablet     Refill:  0     Medications Discontinued During This Encounter   Medication Reason     furosemide (LASIX) 20 MG tablet          Encounter Diagnoses   Name Primary?     Chronic diastolic congestive heart failure (H)      Aortic valve stenosis, etiology of cardiac valve disease unspecified Yes     Mild ascending aorta  dilatation (H)      Benign hypertension        CURRENT MEDICATIONS:  Current Outpatient Medications   Medication Sig Dispense Refill     aspirin 81 MG EC tablet Take 81 mg by mouth daily       aspirin-acetaminophen-caffeine (EXCEDRIN MIGRAINE) 250-250-65 MG tablet Take 2 tablets by mouth daily as needed for headaches       atenolol (TENORMIN) 25 MG tablet Take 1 tablet (25 mg) by mouth every morning 90 tablet 1     baclofen (LIORESAL) 10 MG tablet Take 1 tablet (10 mg) by mouth 2 times daily 180 tablet 1     BIOTIN PO Take 1 tablet by mouth every morning       buPROPion (WELLBUTRIN XL) 150 MG 24 hr tablet Take 1 tablet (150 mg) by mouth every morning 90 tablet 1     citalopram (CELEXA) 20 MG tablet Take 1 tablet (20 mg) by mouth daily 90 tablet 1     folic acid (FOLVITE) 1 MG tablet Take 1 mg by mouth daily       furosemide (LASIX) 20 MG tablet One tablet every other day. 90 tablet 0     gabapentin (NEURONTIN) 300 MG capsule Take 300 mg by mouth every morning       gabapentin (NEURONTIN) 300 MG capsule Take 600 mg by mouth every evening       HYDROmorphone (DILAUDID) 2 MG tablet Take 1 tablet (2 mg) by mouth every 6 hours as needed for moderate to severe pain 20 tablet 0     hydrOXYzine (ATARAX) 25 MG tablet TAKE 1 TABLET (25 MG) BY MOUTH EVERY 6 HOURS AS NEEDED FOR ANXIETY 90 tablet 0     hydrOXYzine (ATARAX) 25 MG tablet Take 50 mg by mouth At Bedtime (25MG X 2 = 50MG)  TAKES IN ADDITION TO MORNING DOSE.       Multiple Vitamins-Minerals (CENTRUM SILVER) per tablet Take 1 tablet by mouth daily       ondansetron (ZOFRAN-ODT) 4 MG ODT tab Take 1 tablet (4 mg) by mouth every 6 hours as needed for nausea or vomiting 20 tablet 0     polyethylene glycol (MIRALAX/GLYCOLAX) Packet Take 17 g by mouth daily as needed (constipation) 7 packet      senna-docusate (SENOKOT-S;PERICOLACE) 8.6-50 MG per tablet Take 1-2 tablets by mouth 2 times daily as needed for constipation 100 tablet      traZODone (DESYREL) 100 MG tablet Take  100 mg by mouth At Bedtime       TURMERIC PO Take 1 capsule by mouth every morning       vitamin C (ASCORBIC ACID) 250 MG tablet Take 250 mg by mouth daily       HUMIRA PEN-PS/UV/ADOL HS START 40 MG/0.8ML pen kit Inject 0.8 mLs (40 mg) Subcutaneous every 14 days (Patient not taking: Reported on 2/26/2020)  0     STATIN NOT PRESCRIBED, INTENTIONAL, Please choose reason not prescribed, below         ALLERGIES     Allergies   Allergen Reactions     Bactrim [Sulfamethoxazole W/Trimethoprim] Hives     Codeine Itching     NAUSEA     Morphine Itching     NAUSEA       PAST MEDICAL HISTORY:  Past Medical History:   Diagnosis Date     Alcohol abuse     Long term alcohol abuse. Abstinenet since October 2019.     Anxiety disorder      Ascending aorta dilatation (H)      Bariatric surgery status 1996?    gastric bypass, [facility] and     Benign hypertension      Chronic insomnia      Chronic pain syndrome     Chronic back and neck pain, chronic pain due to osteoarthritis multiple joints     Coronary artery disease involving native coronary artery of native heart without angina pectoris 10/16/2018    Minimal coronary artery disease on coronary angiogram in 2015.      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     Major depressive disorder, single episode, severe, without mention of psychotic behavior      Mixed hyperlipidemia      Moderate aortic stenosis 05/2014    moderate to severe aortic valve stenosis     Pelvic relaxation disorder     Surgical intervention for cystocele/rectocele 3,11/2012     Personal history of urinary calculi 6/2006    left ureteral stone,lithotripsy     Psoriasis      PVC (premature ventricular contraction)      Spinal stenosis      Stage III chronic  kidney disease (H) 2005     SVT (supraventricular tachycardia) (H)     likely atrial tachycardia       PAST SURGICAL HISTORY:  Past Surgical History:   Procedure Laterality Date     APPENDECTOMY  3/2004    incidental     ARTHRODESIS TOE(S) Right 1/31/2020    Procedure: RIGHT FIRST METATARSAL PHALANGEAL JOINT ARTHRODESIS;  Surgeon: Steven Reyes MD;  Location: [facility] OR     C GASTRIC BYPASS,OBESE<100CM ARIANNA-EN-Y  1996     C MEDIASTINOSCOPY W OR WO BIOPSY  2/2008    Videomediastinoscopy and, for mediastinal adenopathy -reactive lymphoid hyperplasia     C REPAIR OF RECTOCELE  3/2012     C TOTAL KNEE ARTHROPLASTY  12/2005    left      CARPAL TUNNEL RELEASE RT/LT  10/2010    Carpometacarpal excisional arthroplasty with a fascial autograft and APL suspension sling (63718). 2. Left thumb metacarpophalangeal joint fusion with autologous bone graft ([id number]). 3. Left endoscopic carpal tunnel release      CHOLECYSTECTOMY, LAPOROSCOPIC  11/2010    Cholecystectomy, Laparoscopic     COLONOSCOPY N/A 9/8/2016    Procedure: COMBINED COLONOSCOPY, SINGLE OR MULTIPLE BIOPSY/POLYPECTOMY BY BIOPSY;  Surgeon: Moe Barlow MD;  Location:  GI     CYSTOCELE REPAIR  11/2012    davinc laparoscopic sacrocolpopexy, enterocele repair, lysis of adhesions, placement of retropubic mid urethral sling, cystoscopy     CYSTOSCOPY, LITHOTRIPSY, COMBINED  6/2006    Left extracorporeal shock wave lithotripsy, cystoscopy, left ureteral stent placement.     CYSTOSCOPY, REMOVE STENT(S), COMBINED  7/2006    Cystoscopy, removal of left ureteral stent, retrograde pyelography, flexible and rigid ureteroscopy and holmium laser lithotripsy, basket removal of stone fragments, ureteral stent placement.      ENDOSCOPIC ULTRASOUND UPPER GASTROINTESTINAL TRACT (GI) N/A 6/12/2017    Procedure: ENDOSCOPIC ULTRASOUND, ESOPHAGOSCOPY / UPPER GASTROINTESTINAL TRACT (GI);  ENDOSCOPIC ULTRASOUND, ESOPHAGOSCOPY / UPPER GASTROINTESTINAL TRACT (GI);  Surgeon: Parth Graham  "MD Melissa;  Location:  GI     HERNIA REPAIR  4/2012    bilateral augmentation mastopexy, ventral hernia repair, and medial thigh liposuction on 04/06/2012.      HYSTERECTOMY VAGINAL, BILATERAL SALPINGO-OOPHERECTOMY, COMBINED  1998    due to myoma and bleeding     JOINT REPLACEMENT, HIP RT/LT  4/2004    right total hip arthroplasty     LAPAROTOMY, LYSIS ADHESIONS, COMBINED  3/2004    lysis adhesions, ventral hernia repair, appendectomy incidentally     LYMPH NODE BIOPSY  4/2008    right axillary, reactive follicular and paracortical hyperplasia.     MAMMOPLASTY AUGMENTATION BILATERAL  4/2012     REPAIR HAMMER TOE Right 1/31/2020    Procedure: WITH SECOND AND THIRD CLAW TOE RECONSTRUCTION;  Surgeon: Steven Reyes MD;  Location:  OR     REVISE RECONSTRUCTED BREAST  6/7/2012    Left breast capsulotomy.        FAMILY HISTORY:  Family History   Problem Relation Age of Onset     Substance Abuse Father      Cancer Father         throat and lung mets     Diabetes No family hx of      Coronary Artery Disease No family hx of      Cerebrovascular Disease No family hx of        SOCIAL HISTORY:  Social History     Socioeconomic History     Marital status:      Spouse name: Mt     Number of children: 4     Years of education: 18     Highest education level: None   Occupational History     Occupation: nurse     Employer: [employer]     Employer: RETIRED   Social Needs     Financial resource strain: None     Food insecurity:     Worry: None     Inability: None     Transportation needs:     Medical: None     Non-medical: None   Tobacco Use     Smoking status: Never Smoker     Smokeless tobacco: Never Used   Substance and Sexual Activity     Alcohol use: Not Currently     Alcohol/week: 63.0 standard drinks     Types: 63 Standard drinks or equivalent per week     Comment: three \"3oz\" liquor drinks nightly/ sober since October     Drug use: No     Sexual activity: Yes     Partners: Male   Lifestyle     Physical " "activity:     Days per week: None     Minutes per session: None     Stress: None   Relationships     Social connections:     Talks on phone: None     Gets together: None     Attends Zoroastrianism service: None     Active member of club or organization: None     Attends meetings of clubs or organizations: None     Relationship status: None     Intimate partner violence:     Fear of current or ex partner: None     Emotionally abused: None     Physically abused: None     Forced sexual activity: None   Other Topics Concern      Service Not Asked     Blood Transfusions No     Caffeine Concern Yes     Comment: 1-2 cups per day      Occupational Exposure Yes     Comment: blood     Hobby Hazards No     Sleep Concern Yes     Stress Concern Yes     Weight Concern Yes     Comment: gastric  byepass     Special Diet No     Back Care No     Exercise Yes     Comment: walk, swin     Bike Helmet No     Seat Belt Yes     Self-Exams Yes     Parent/sibling w/ CABG, MI or angioplasty before 65F 55M? Not Asked   Social History Narrative     None       Review of Systems:  Skin:  Negative     Eyes:  Positive for glasses  ENT:  Negative    Respiratory:  Positive for dyspnea on exertion  Cardiovascular:  Negative    Gastroenterology: Negative    Genitourinary:  Negative    Musculoskeletal:  Positive for arthritis;foot pain;back pain  Neurologic:  Negative    Psychiatric:  Positive for sleep disturbances(4-5 hrs per night)  Heme/Lymph/Imm:  Positive for allergies  Endocrine:  Negative      Physical Exam:  Vitals: BP 97/67   Pulse 69   Ht 1.626 m (5' 4\")   Wt 67.1 kg (147 lb 14.4 oz)   BMI 25.39 kg/m        GEN:  NAD  NECK: No JVD  C/V:  Regular rate and rhythm with 3/6 DAVID at Right and Left sternal borders  RESP: Clear to auscultation bilaterally without wheezing, rales, or rhonchi.  GI: Abdomen soft, nontender, nondistended. No HSM appreciated.   EXTREM: No LE edema.   NEURO: Alert and oriented, cooperative. No obvious focal " deficits.   PSYCH: Normal affect.  SKIN: Warm and dry.       Recent Lab Results:  LIPID RESULTS:  Lab Results   Component Value Date    CHOL 317 (H) 02/12/2020    HDL 81 02/12/2020     (H) 02/12/2020    TRIG 91 02/12/2020    CHOLHDLRATIO 1.5 07/28/2015       LIVER ENZYME RESULTS:  Lab Results   Component Value Date    AST 20 02/11/2020    ALT 17 02/11/2020       CBC RESULTS:  Lab Results   Component Value Date    WBC 5.6 02/14/2020    RBC 3.20 (L) 02/14/2020    HGB 9.9 (L) 02/14/2020    HCT 30.8 (L) 02/14/2020    MCV 96 02/14/2020    MCH 30.9 02/14/2020    MCHC 32.1 02/14/2020    RDW 15.3 (H) 02/14/2020     02/14/2020       BMP RESULTS:  Lab Results   Component Value Date     02/26/2020    POTASSIUM 3.5 02/26/2020    CHLORIDE 97 (L) 02/26/2020    CO2 31 (H) 02/26/2020    ANIONGAP 13.5 02/26/2020     02/26/2020    BUN 18 02/26/2020    CR 1.50 (H) 02/26/2020    GFRESTIMATED 34 (L) 02/26/2020    GFRESTBLACK 41 (L) 02/26/2020    BIRGIT 10.0 02/26/2020        A1C RESULTS:  Lab Results   Component Value Date    A1C 5.7 09/29/2010       INR RESULTS:  Lab Results   Component Value Date    INR 1.03 06/16/2017    INR 1.15 (H) 06/11/2017           Michael Kinsey PA-C   2/26/2020  Pager: [phone]        Thank you for allowing me to participate in the care of your patient.    Sincerely,     Michael Kinsey PA-C     [facility]

## 2020-02-26 NOTE — PATIENT INSTRUCTIONS
Today's Plan:   1) Drink more water.   2) Decrease furosemide to one tablet every other day.   3) Come back for follow up in 2-3 weeks for follow up and repeat lab.   4) Follow up with Dr. Ugarte with repeat heart ultrasound (echo).    If you have questions or concerns please call my nurse team at [phone].     Scheduling phone number: [phone]  Reminder: Please bring in all current medications, over the counter supplements and vitamin bottles to your next appointment.    It was a pleasure seeing you today!     Michael Kinsey PA-C  2/26/2020

## 2020-02-26 NOTE — LETTER
2/26/2020    Nayeli Castorena PA-C  [address] [facility] [address]    RE: Kavitha Headley       Dear Colleague,    I had the pleasure of seeing Kavitha Headley in the [facility].    Primary Cardiologist: Dr. Ugarte    Reason for Visit: Hospital Follow up    History of Present Illness:   This is a very pleasant 74-year-old female with past medical history notable for moderately severe aortic valve stenosis (mean gradient of 25 mmHg with a valve area of .98 cm  with peak transaortic velocity of 3.4 m/s in the setting of preserved LVEF; dimensionless index of 0.29), mild ascending aortic dilatation, alcohol dependence (drinks 3 to 4 glasses of hard liquor every day; she was referred to chemical dependency recently by her primary care provider), history of SVT, minimal nonobstructive CAD, and chronic mild thrombocytopenia.    She was last seen in cardiology clinic by Dr. Ugarte in 12/2018 and at that time she was recommended to follow-up with us in 4 to 5 months for consideration of transesophageal echocardiogram to relook at her aortic valve as well as CT angiogram for remeasurement of her known mild ascending aorta.  She was also initiated on low-dose atenolol.    CTA confirms that she does have mild a sending aorta dilatation measuring 4.1 cm.  This correlates with her echocardiogram findings.  We will continue with serial echocardiograms for further monitoring of this.    ALEJANDRA was completed and this showed moderate aortic valve stenosis with mean gradient of 33 mmHg and peak velocity of 3.2 m/sec. RENE was 1.1 cm2. She has preserved biventricular function. This was reviewed with Dr. Ugarte who recommended monitoring.     She unfortunately was admitted earlier this month with SOB in the setting of holding her furosemide after foot surgery. She required short course of IV diuresis and was later discharged to home stable condition. She was recommended to have repeat echo in 6  months for evaluation of her aortic valve.     She returns to clinic today, stating she continues to have no significant symptoms.  She denies chest discomfort, shortness of breath, presyncope, or syncope.  She does notice some exercise intolerance.     Assessment and Plan:   This is a very pleasant 74-year-old female with past medical history notable for moderately severe aortic valve stenosis (mean gradient of 25 mmHg with a valve area of .98 cm  with peak transaortic velocity of 3.4 m/s in the setting of preserved LVEF; dimensionless index of 0.29), mild ascending aortic dilatation, alcohol dependence (drinks 3 to 4 glasses of hard liquor every day; she was referred to chemical dependency recently by her primary care provider), history of SVT, minimal nonobstructive CAD, and chronic mild thrombocytopenia.    Her blood pressure is low today and renal function is worse. I have asked her to decrease furosemide to one tablet every other day. She will return in a few weeks with repeat BMP beforehand. We will have her see Dr. Ugarte in 6 months with repeat echocardiogram.     Thank you for allowing me to participate in the care of this pleasant patient today.      This note was completed in part using Dragon voice recognition software. Although reviewed after completion, some word and grammatical errors may occur.    Orders this Visit:  Orders Placed This Encounter   Procedures     Basic metabolic panel     Follow-Up with Cardiac Advanced Practice Provider     Orders Placed This Encounter   Medications     furosemide (LASIX) 20 MG tablet     Sig: One tablet every other day.     Dispense:  90 tablet     Refill:  0     Medications Discontinued During This Encounter   Medication Reason     furosemide (LASIX) 20 MG tablet          Encounter Diagnoses   Name Primary?     Chronic diastolic congestive heart failure (H)      Aortic valve stenosis, etiology of cardiac valve disease unspecified Yes     Mild ascending aorta  dilatation (H)      Benign hypertension        CURRENT MEDICATIONS:  Current Outpatient Medications   Medication Sig Dispense Refill     aspirin 81 MG EC tablet Take 81 mg by mouth daily       aspirin-acetaminophen-caffeine (EXCEDRIN MIGRAINE) 250-250-65 MG tablet Take 2 tablets by mouth daily as needed for headaches       atenolol (TENORMIN) 25 MG tablet Take 1 tablet (25 mg) by mouth every morning 90 tablet 1     baclofen (LIORESAL) 10 MG tablet Take 1 tablet (10 mg) by mouth 2 times daily 180 tablet 1     BIOTIN PO Take 1 tablet by mouth every morning       buPROPion (WELLBUTRIN XL) 150 MG 24 hr tablet Take 1 tablet (150 mg) by mouth every morning 90 tablet 1     citalopram (CELEXA) 20 MG tablet Take 1 tablet (20 mg) by mouth daily 90 tablet 1     folic acid (FOLVITE) 1 MG tablet Take 1 mg by mouth daily       furosemide (LASIX) 20 MG tablet One tablet every other day. 90 tablet 0     gabapentin (NEURONTIN) 300 MG capsule Take 300 mg by mouth every morning       gabapentin (NEURONTIN) 300 MG capsule Take 600 mg by mouth every evening       HYDROmorphone (DILAUDID) 2 MG tablet Take 1 tablet (2 mg) by mouth every 6 hours as needed for moderate to severe pain 20 tablet 0     hydrOXYzine (ATARAX) 25 MG tablet TAKE 1 TABLET (25 MG) BY MOUTH EVERY 6 HOURS AS NEEDED FOR ANXIETY 90 tablet 0     hydrOXYzine (ATARAX) 25 MG tablet Take 50 mg by mouth At Bedtime (25MG X 2 = 50MG)  TAKES IN ADDITION TO MORNING DOSE.       Multiple Vitamins-Minerals (CENTRUM SILVER) per tablet Take 1 tablet by mouth daily       ondansetron (ZOFRAN-ODT) 4 MG ODT tab Take 1 tablet (4 mg) by mouth every 6 hours as needed for nausea or vomiting 20 tablet 0     polyethylene glycol (MIRALAX/GLYCOLAX) Packet Take 17 g by mouth daily as needed (constipation) 7 packet      senna-docusate (SENOKOT-S;PERICOLACE) 8.6-50 MG per tablet Take 1-2 tablets by mouth 2 times daily as needed for constipation 100 tablet      traZODone (DESYREL) 100 MG tablet Take  100 mg by mouth At Bedtime       TURMERIC PO Take 1 capsule by mouth every morning       vitamin C (ASCORBIC ACID) 250 MG tablet Take 250 mg by mouth daily       HUMIRA PEN-PS/UV/ADOL HS START 40 MG/0.8ML pen kit Inject 0.8 mLs (40 mg) Subcutaneous every 14 days (Patient not taking: Reported on 2/26/2020)  0     STATIN NOT PRESCRIBED, INTENTIONAL, Please choose reason not prescribed, below         ALLERGIES     Allergies   Allergen Reactions     Bactrim [Sulfamethoxazole W/Trimethoprim] Hives     Codeine Itching     NAUSEA     Morphine Itching     NAUSEA       PAST MEDICAL HISTORY:  Past Medical History:   Diagnosis Date     Alcohol abuse     Long term alcohol abuse. Abstinenet since October 2019.     Anxiety disorder      Ascending aorta dilatation (H)      Bariatric surgery status 1996?    gastric bypass, [facility] and     Benign hypertension      Chronic insomnia      Chronic pain syndrome     Chronic back and neck pain, chronic pain due to osteoarthritis multiple joints     Coronary artery disease involving native coronary artery of native heart without angina pectoris 10/16/2018    Minimal coronary artery disease on coronary angiogram in 2015.      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     Major depressive disorder, single episode, severe, without mention of psychotic behavior      Mixed hyperlipidemia      Moderate aortic stenosis 05/2014    moderate to severe aortic valve stenosis     Pelvic relaxation disorder     Surgical intervention for cystocele/rectocele 3,11/2012     Personal history of urinary calculi 6/2006    left ureteral stone,lithotripsy     Psoriasis      PVC (premature ventricular contraction)      Spinal stenosis      Stage III chronic  kidney disease (H) 2005     SVT (supraventricular tachycardia) (H)     likely atrial tachycardia       PAST SURGICAL HISTORY:  Past Surgical History:   Procedure Laterality Date     APPENDECTOMY  3/2004    incidental     ARTHRODESIS TOE(S) Right 1/31/2020    Procedure: RIGHT FIRST METATARSAL PHALANGEAL JOINT ARTHRODESIS;  Surgeon: Steven Reyes MD;  Location: [facility] OR     C GASTRIC BYPASS,OBESE<100CM ARIANNA-EN-Y  1996     C MEDIASTINOSCOPY W OR WO BIOPSY  2/2008    Videomediastinoscopy and, for mediastinal adenopathy -reactive lymphoid hyperplasia     C REPAIR OF RECTOCELE  3/2012     C TOTAL KNEE ARTHROPLASTY  12/2005    left      CARPAL TUNNEL RELEASE RT/LT  10/2010    Carpometacarpal excisional arthroplasty with a fascial autograft and APL suspension sling (65909). 2. Left thumb metacarpophalangeal joint fusion with autologous bone graft (08674). 3. Left endoscopic carpal tunnel release      CHOLECYSTECTOMY, LAPOROSCOPIC  11/2010    Cholecystectomy, Laparoscopic     COLONOSCOPY N/A 9/8/2016    Procedure: COMBINED COLONOSCOPY, SINGLE OR MULTIPLE BIOPSY/POLYPECTOMY BY BIOPSY;  Surgeon: Moe Barlow MD;  Location:  GI     CYSTOCELE REPAIR  11/2012    davinc laparoscopic sacrocolpopexy, enterocele repair, lysis of adhesions, placement of retropubic mid urethral sling, cystoscopy     CYSTOSCOPY, LITHOTRIPSY, COMBINED  6/2006    Left extracorporeal shock wave lithotripsy, cystoscopy, left ureteral stent placement.     CYSTOSCOPY, REMOVE STENT(S), COMBINED  7/2006    Cystoscopy, removal of left ureteral stent, retrograde pyelography, flexible and rigid ureteroscopy and holmium laser lithotripsy, basket removal of stone fragments, ureteral stent placement.      ENDOSCOPIC ULTRASOUND UPPER GASTROINTESTINAL TRACT (GI) N/A 6/12/2017    Procedure: ENDOSCOPIC ULTRASOUND, ESOPHAGOSCOPY / UPPER GASTROINTESTINAL TRACT (GI);  ENDOSCOPIC ULTRASOUND, ESOPHAGOSCOPY / UPPER GASTROINTESTINAL TRACT (GI);  Surgeon: Parth Graham  "MD Melissa;  Location:  GI     HERNIA REPAIR  4/2012    bilateral augmentation mastopexy, ventral hernia repair, and medial thigh liposuction on 04/06/2012.      HYSTERECTOMY VAGINAL, BILATERAL SALPINGO-OOPHERECTOMY, COMBINED  1998    due to myoma and bleeding     JOINT REPLACEMENT, HIP RT/LT  4/2004    right total hip arthroplasty     LAPAROTOMY, LYSIS ADHESIONS, COMBINED  3/2004    lysis adhesions, ventral hernia repair, appendectomy incidentally     LYMPH NODE BIOPSY  4/2008    right axillary, reactive follicular and paracortical hyperplasia.     MAMMOPLASTY AUGMENTATION BILATERAL  4/2012     REPAIR HAMMER TOE Right 1/31/2020    Procedure: WITH SECOND AND THIRD CLAW TOE RECONSTRUCTION;  Surgeon: Steven Reyes MD;  Location:  OR     REVISE RECONSTRUCTED BREAST  6/7/2012    Left breast capsulotomy.        FAMILY HISTORY:  Family History   Problem Relation Age of Onset     Substance Abuse Father      Cancer Father         throat and lung mets     Diabetes No family hx of      Coronary Artery Disease No family hx of      Cerebrovascular Disease No family hx of        SOCIAL HISTORY:  Social History     Socioeconomic History     Marital status:      Spouse name: Mt     Number of children: 4     Years of education: 18     Highest education level: None   Occupational History     Occupation: nurse     Employer: [employer]     Employer: RETIRED   Social Needs     Financial resource strain: None     Food insecurity:     Worry: None     Inability: None     Transportation needs:     Medical: None     Non-medical: None   Tobacco Use     Smoking status: Never Smoker     Smokeless tobacco: Never Used   Substance and Sexual Activity     Alcohol use: Not Currently     Alcohol/week: 63.0 standard drinks     Types: 63 Standard drinks or equivalent per week     Comment: three \"3oz\" liquor drinks nightly/ sober since October     Drug use: No     Sexual activity: Yes     Partners: Male   Lifestyle     Physical " "activity:     Days per week: None     Minutes per session: None     Stress: None   Relationships     Social connections:     Talks on phone: None     Gets together: None     Attends [employer] service: None     Active member of club or organization: None     Attends meetings of clubs or organizations: None     Relationship status: None     Intimate partner violence:     Fear of current or ex partner: None     Emotionally abused: None     Physically abused: None     Forced sexual activity: None   Other Topics Concern      Service Not Asked     Blood Transfusions No     Caffeine Concern Yes     Comment: 1-2 cups per day      Occupational Exposure Yes     Comment: blood     Hobby Hazards No     Sleep Concern Yes     Stress Concern Yes     Weight Concern Yes     Comment: gastric  byepass     Special Diet No     Back Care No     Exercise Yes     Comment: walk, swin     Bike Helmet No     Seat Belt Yes     Self-Exams Yes     Parent/sibling w/ CABG, MI or angioplasty before 65F 55M? Not Asked   Social History Narrative     None       Review of Systems:  Skin:  Negative     Eyes:  Positive for glasses  ENT:  Negative    Respiratory:  Positive for dyspnea on exertion  Cardiovascular:  Negative    Gastroenterology: Negative    Genitourinary:  Negative    Musculoskeletal:  Positive for arthritis;foot pain;back pain  Neurologic:  Negative    Psychiatric:  Positive for sleep disturbances(4-5 hrs per night)  Heme/Lymph/Imm:  Positive for allergies  Endocrine:  Negative      Physical Exam:  Vitals: BP 97/67   Pulse 69   Ht 1.626 m (5' 4\")   Wt 67.1 kg (147 lb 14.4 oz)   BMI 25.39 kg/m        GEN:  NAD  NECK: No JVD  C/V:  Regular rate and rhythm with 3/6 DAVID at Right and Left sternal borders  RESP: Clear to auscultation bilaterally without wheezing, rales, or rhonchi.  GI: Abdomen soft, nontender, nondistended. No HSM appreciated.   EXTREM: No LE edema.   NEURO: Alert and oriented, cooperative. No obvious focal " deficits.   PSYCH: Normal affect.  SKIN: Warm and dry.       Recent Lab Results:  LIPID RESULTS:  Lab Results   Component Value Date    CHOL 317 (H) 02/12/2020    HDL 81 02/12/2020     (H) 02/12/2020    TRIG 91 02/12/2020    CHOLHDLRATIO 1.5 07/28/2015       LIVER ENZYME RESULTS:  Lab Results   Component Value Date    AST 20 02/11/2020    ALT 17 02/11/2020       CBC RESULTS:  Lab Results   Component Value Date    WBC 5.6 02/14/2020    RBC 3.20 (L) 02/14/2020    HGB 9.9 (L) 02/14/2020    HCT 30.8 (L) 02/14/2020    MCV 96 02/14/2020    MCH 30.9 02/14/2020    MCHC 32.1 02/14/2020    RDW 15.3 (H) 02/14/2020     02/14/2020       BMP RESULTS:  Lab Results   Component Value Date     02/26/2020    POTASSIUM 3.5 02/26/2020    CHLORIDE 97 (L) 02/26/2020    CO2 31 (H) 02/26/2020    ANIONGAP 13.5 02/26/2020     02/26/2020    BUN 18 02/26/2020    CR 1.50 (H) 02/26/2020    GFRESTIMATED 34 (L) 02/26/2020    GFRESTBLACK 41 (L) 02/26/2020    BIRGIT 10.0 02/26/2020        A1C RESULTS:  Lab Results   Component Value Date    A1C 5.7 09/29/2010       INR RESULTS:  Lab Results   Component Value Date    INR 1.03 06/16/2017    INR 1.15 (H) 06/11/2017           Michael Kinsey PA-C   2/26/2020  Pager: [phone]        Thank you for allowing me to participate in the care of your patient.      Sincerely,     Michael Kinsey PA-C     [facility]    cc:   Michael Kinsey PA-C  [address] [place] [address]

## 2020-02-26 NOTE — PROGRESS NOTES
Primary Cardiologist: Dr. Ugarte    Reason for Visit: Hospital Follow up    History of Present Illness:   This is a very pleasant 74-year-old female with past medical history notable for moderately severe aortic valve stenosis (mean gradient of 25 mmHg with a valve area of .98 cm  with peak transaortic velocity of 3.4 m/s in the setting of preserved LVEF; dimensionless index of 0.29), mild ascending aortic dilatation, alcohol dependence (drinks 3 to 4 glasses of hard liquor every day; she was referred to chemical dependency recently by her primary care provider), history of SVT, minimal nonobstructive CAD, and chronic mild thrombocytopenia.    She was last seen in cardiology clinic by Dr. Ugarte in 12/2018 and at that time she was recommended to follow-up with us in 4 to 5 months for consideration of transesophageal echocardiogram to relook at her aortic valve as well as CT angiogram for remeasurement of her known mild ascending aorta.  She was also initiated on low-dose atenolol.    CTA confirms that she does have mild a sending aorta dilatation measuring 4.1 cm.  This correlates with her echocardiogram findings.  We will continue with serial echocardiograms for further monitoring of this.    ALEJANDRA was completed and this showed moderate aortic valve stenosis with mean gradient of 33 mmHg and peak velocity of 3.2 m/sec. RENE was 1.1 cm2. She has preserved biventricular function. This was reviewed with Dr. Ugarte who recommended monitoring.     She unfortunately was admitted earlier this month with SOB in the setting of holding her furosemide after foot surgery. She required short course of IV diuresis and was later discharged to home stable condition. She was recommended to have repeat echo in 6 months for evaluation of her aortic valve.     She returns to clinic today, stating she continues to have no significant symptoms.  She denies chest discomfort, shortness of breath, presyncope, or syncope.  She does notice some  exercise intolerance.     Assessment and Plan:   This is a very pleasant 74-year-old female with past medical history notable for moderately severe aortic valve stenosis (mean gradient of 25 mmHg with a valve area of .98 cm  with peak transaortic velocity of 3.4 m/s in the setting of preserved LVEF; dimensionless index of 0.29), mild ascending aortic dilatation, alcohol dependence (drinks 3 to 4 glasses of hard liquor every day; she was referred to chemical dependency recently by her primary care provider), history of SVT, minimal nonobstructive CAD, and chronic mild thrombocytopenia.    Her blood pressure is low today and renal function is worse. I have asked her to decrease furosemide to one tablet every other day. She will return in a few weeks with repeat BMP beforehand. We will have her see Dr. Ugarte in 6 months with repeat echocardiogram.     Thank you for allowing me to participate in the care of this pleasant patient today.      This note was completed in part using Dragon voice recognition software. Although reviewed after completion, some word and grammatical errors may occur.    Orders this Visit:  Orders Placed This Encounter   Procedures     Basic metabolic panel     Follow-Up with Cardiac Advanced Practice Provider     Orders Placed This Encounter   Medications     furosemide (LASIX) 20 MG tablet     Sig: One tablet every other day.     Dispense:  90 tablet     Refill:  0     Medications Discontinued During This Encounter   Medication Reason     furosemide (LASIX) 20 MG tablet          Encounter Diagnoses   Name Primary?     Chronic diastolic congestive heart failure (H)      Aortic valve stenosis, etiology of cardiac valve disease unspecified Yes     Mild ascending aorta dilatation (H)      Benign hypertension        CURRENT MEDICATIONS:  Current Outpatient Medications   Medication Sig Dispense Refill     aspirin 81 MG EC tablet Take 81 mg by mouth daily       aspirin-acetaminophen-caffeine (EXCEDRIN  MIGRAINE) 250-250-65 MG tablet Take 2 tablets by mouth daily as needed for headaches       atenolol (TENORMIN) 25 MG tablet Take 1 tablet (25 mg) by mouth every morning 90 tablet 1     baclofen (LIORESAL) 10 MG tablet Take 1 tablet (10 mg) by mouth 2 times daily 180 tablet 1     BIOTIN PO Take 1 tablet by mouth every morning       buPROPion (WELLBUTRIN XL) 150 MG 24 hr tablet Take 1 tablet (150 mg) by mouth every morning 90 tablet 1     citalopram (CELEXA) 20 MG tablet Take 1 tablet (20 mg) by mouth daily 90 tablet 1     folic acid (FOLVITE) 1 MG tablet Take 1 mg by mouth daily       furosemide (LASIX) 20 MG tablet One tablet every other day. 90 tablet 0     gabapentin (NEURONTIN) 300 MG capsule Take 300 mg by mouth every morning       gabapentin (NEURONTIN) 300 MG capsule Take 600 mg by mouth every evening       HYDROmorphone (DILAUDID) 2 MG tablet Take 1 tablet (2 mg) by mouth every 6 hours as needed for moderate to severe pain 20 tablet 0     hydrOXYzine (ATARAX) 25 MG tablet TAKE 1 TABLET (25 MG) BY MOUTH EVERY 6 HOURS AS NEEDED FOR ANXIETY 90 tablet 0     hydrOXYzine (ATARAX) 25 MG tablet Take 50 mg by mouth At Bedtime (25MG X 2 = 50MG)  TAKES IN ADDITION TO MORNING DOSE.       Multiple Vitamins-Minerals (CENTRUM SILVER) per tablet Take 1 tablet by mouth daily       ondansetron (ZOFRAN-ODT) 4 MG ODT tab Take 1 tablet (4 mg) by mouth every 6 hours as needed for nausea or vomiting 20 tablet 0     polyethylene glycol (MIRALAX/GLYCOLAX) Packet Take 17 g by mouth daily as needed (constipation) 7 packet      senna-docusate (SENOKOT-S;PERICOLACE) 8.6-50 MG per tablet Take 1-2 tablets by mouth 2 times daily as needed for constipation 100 tablet      traZODone (DESYREL) 100 MG tablet Take 100 mg by mouth At Bedtime       TURMERIC PO Take 1 capsule by mouth every morning       vitamin C (ASCORBIC ACID) 250 MG tablet Take 250 mg by mouth daily       HUMIRA PEN-PS/UV/ADOL HS START 40 MG/0.8ML pen kit Inject 0.8 mLs (40 mg)  Subcutaneous every 14 days (Patient not taking: Reported on 2/26/2020)  0     STATIN NOT PRESCRIBED, INTENTIONAL, Please choose reason not prescribed, below         ALLERGIES     Allergies   Allergen Reactions     Bactrim [Sulfamethoxazole W/Trimethoprim] Hives     Codeine Itching     NAUSEA     Morphine Itching     NAUSEA       PAST MEDICAL HISTORY:  Past Medical History:   Diagnosis Date     Alcohol abuse     Long term alcohol abuse. Abstinenet since October 2019.     Anxiety disorder      Ascending aorta dilatation (H)      Bariatric surgery status 1996?    gastric bypass, [facility] and     Benign hypertension      Chronic insomnia      Chronic pain syndrome     Chronic back and neck pain, chronic pain due to osteoarthritis multiple joints     Coronary artery disease involving native coronary artery of native heart without angina pectoris 10/16/2018    Minimal coronary artery disease on coronary angiogram in 2015.      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     Major depressive disorder, single episode, severe, without mention of psychotic behavior      Mixed hyperlipidemia      Moderate aortic stenosis 05/2014    moderate to severe aortic valve stenosis     Pelvic relaxation disorder     Surgical intervention for cystocele/rectocele 3,11/2012     Personal history of urinary calculi 6/2006    left ureteral stone,lithotripsy     Psoriasis      PVC (premature ventricular contraction)      Spinal stenosis      Stage III chronic kidney disease (H) 2005     SVT (supraventricular tachycardia) (H)     likely atrial tachycardia       PAST SURGICAL HISTORY:  Past Surgical History:   Procedure Laterality Date     APPENDECTOMY  3/2004    incidental     ARTHRODESIS TOE(S)  Right 1/31/2020    Procedure: RIGHT FIRST METATARSAL PHALANGEAL JOINT ARTHRODESIS;  Surgeon: Steven Reyes MD;  Location:  OR     C GASTRIC BYPASS,OBESE<100CM ARIANNA-EN-Y  1996     C MEDIASTINOSCOPY W OR WO BIOPSY  2/2008    Videomediastinoscopy and, for mediastinal adenopathy -reactive lymphoid hyperplasia     C REPAIR OF RECTOCELE  3/2012     C TOTAL KNEE ARTHROPLASTY  12/2005    left      CARPAL TUNNEL RELEASE RT/LT  10/2010    Carpometacarpal excisional arthroplasty with a fascial autograft and APL suspension sling (37233). 2. Left thumb metacarpophalangeal joint fusion with autologous bone graft (18545). 3. Left endoscopic carpal tunnel release      CHOLECYSTECTOMY, LAPOROSCOPIC  11/2010    Cholecystectomy, Laparoscopic     COLONOSCOPY N/A 9/8/2016    Procedure: COMBINED COLONOSCOPY, SINGLE OR MULTIPLE BIOPSY/POLYPECTOMY BY BIOPSY;  Surgeon: Moe Barlow MD;  Location:  GI     CYSTOCELE REPAIR  11/2012    davinci laparoscopic sacrocolpopexy, enterocele repair, lysis of adhesions, placement of retropubic mid urethral sling, cystoscopy     CYSTOSCOPY, LITHOTRIPSY, COMBINED  6/2006    Left extracorporeal shock wave lithotripsy, cystoscopy, left ureteral stent placement.     CYSTOSCOPY, REMOVE STENT(S), COMBINED  7/2006    Cystoscopy, removal of left ureteral stent, retrograde pyelography, flexible and rigid ureteroscopy and holmium laser lithotripsy, basket removal of stone fragments, ureteral stent placement.      ENDOSCOPIC ULTRASOUND UPPER GASTROINTESTINAL TRACT (GI) N/A 6/12/2017    Procedure: ENDOSCOPIC ULTRASOUND, ESOPHAGOSCOPY / UPPER GASTROINTESTINAL TRACT (GI);  ENDOSCOPIC ULTRASOUND, ESOPHAGOSCOPY / UPPER GASTROINTESTINAL TRACT (GI);  Surgeon: Parth Graham MD;  Location:  GI     HERNIA REPAIR  4/2012    bilateral augmentation mastopexy, ventral hernia repair, and medial thigh liposuction on 04/06/2012.      HYSTERECTOMY VAGINAL, BILATERAL SALPINGO-OOPHERECTOMY, COMBINED  1998    due  "to myoma and bleeding     JOINT REPLACEMENT, HIP RT/LT  4/2004    right total hip arthroplasty     LAPAROTOMY, LYSIS ADHESIONS, COMBINED  3/2004    lysis adhesions, ventral hernia repair, appendectomy incidentally     LYMPH NODE BIOPSY  4/2008    right axillary, reactive follicular and paracortical hyperplasia.     MAMMOPLASTY AUGMENTATION BILATERAL  4/2012     REPAIR HAMMER TOE Right 1/31/2020    Procedure: WITH SECOND AND THIRD CLAW TOE RECONSTRUCTION;  Surgeon: Steven Reyes MD;  Location: [facility]     REVISE RECONSTRUCTED BREAST  6/7/2012    Left breast capsulotomy.        FAMILY HISTORY:  Family History   Problem Relation Age of Onset     Substance Abuse Father      Cancer Father         throat and lung mets     Diabetes No family hx of      Coronary Artery Disease No family hx of      Cerebrovascular Disease No family hx of        SOCIAL HISTORY:  Social History     Socioeconomic History     Marital status:      Spouse name: Mt     Number of children: 4     Years of education: 18     Highest education level: None   Occupational History     Occupation: nurse     Employer: [employer]     Employer: RETIRED   Social Needs     Financial resource strain: None     Food insecurity:     Worry: None     Inability: None     Transportation needs:     Medical: None     Non-medical: None   Tobacco Use     Smoking status: Never Smoker     Smokeless tobacco: Never Used   Substance and Sexual Activity     Alcohol use: Not Currently     Alcohol/week: 63.0 standard drinks     Types: 63 Standard drinks or equivalent per week     Comment: three \"3oz\" liquor drinks nightly/ sober since October     Drug use: No     Sexual activity: Yes     Partners: Male   Lifestyle     Physical activity:     Days per week: None     Minutes per session: None     Stress: None   Relationships     Social connections:     Talks on phone: None     Gets together: None     Attends Confucianism service: None     Active member of club or organization: " "None     Attends meetings of clubs or organizations: None     Relationship status: None     Intimate partner violence:     Fear of current or ex partner: None     Emotionally abused: None     Physically abused: None     Forced sexual activity: None   Other Topics Concern      Service Not Asked     Blood Transfusions No     Caffeine Concern Yes     Comment: 1-2 cups per day      Occupational Exposure Yes     Comment: blood     Hobby Hazards No     Sleep Concern Yes     Stress Concern Yes     Weight Concern Yes     Comment: gastric  byepass     Special Diet No     Back Care No     Exercise Yes     Comment: walk, swin     Bike Helmet No     Seat Belt Yes     Self-Exams Yes     Parent/sibling w/ CABG, MI or angioplasty before 65F 55M? Not Asked   Social History Narrative     None       Review of Systems:  Skin:  Negative     Eyes:  Positive for glasses  ENT:  Negative    Respiratory:  Positive for dyspnea on exertion  Cardiovascular:  Negative    Gastroenterology: Negative    Genitourinary:  Negative    Musculoskeletal:  Positive for arthritis;foot pain;back pain  Neurologic:  Negative    Psychiatric:  Positive for sleep disturbances(4-5 hrs per night)  Heme/Lymph/Imm:  Positive for allergies  Endocrine:  Negative      Physical Exam:  Vitals: BP 97/67   Pulse 69   Ht 1.626 m (5' 4\")   Wt 67.1 kg (147 lb 14.4 oz)   BMI 25.39 kg/m       GEN:  NAD  NECK: No JVD  C/V:  Regular rate and rhythm with 3/6 DAVID at Right and Left sternal borders  RESP: Clear to auscultation bilaterally without wheezing, rales, or rhonchi.  GI: Abdomen soft, nontender, nondistended. No HSM appreciated.   EXTREM: No LE edema.   NEURO: Alert and oriented, cooperative. No obvious focal deficits.   PSYCH: Normal affect.  SKIN: Warm and dry.       Recent Lab Results:  LIPID RESULTS:  Lab Results   Component Value Date    CHOL 317 (H) 02/12/2020    HDL 81 02/12/2020     (H) 02/12/2020    TRIG 91 02/12/2020    CHOLHDLRATIO 1.5 " 07/28/2015       LIVER ENZYME RESULTS:  Lab Results   Component Value Date    AST 20 02/11/2020    ALT 17 02/11/2020       CBC RESULTS:  Lab Results   Component Value Date    WBC 5.6 02/14/2020    RBC 3.20 (L) 02/14/2020    HGB 9.9 (L) 02/14/2020    HCT 30.8 (L) 02/14/2020    MCV 96 02/14/2020    MCH 30.9 02/14/2020    MCHC 32.1 02/14/2020    RDW 15.3 (H) 02/14/2020     02/14/2020       BMP RESULTS:  Lab Results   Component Value Date     02/26/2020    POTASSIUM 3.5 02/26/2020    CHLORIDE 97 (L) 02/26/2020    CO2 31 (H) 02/26/2020    ANIONGAP 13.5 02/26/2020     02/26/2020    BUN 18 02/26/2020    CR 1.50 (H) 02/26/2020    GFRESTIMATED 34 (L) 02/26/2020    GFRESTBLACK 41 (L) 02/26/2020    BIRGIT 10.0 02/26/2020        A1C RESULTS:  Lab Results   Component Value Date    A1C 5.7 09/29/2010       INR RESULTS:  Lab Results   Component Value Date    INR 1.03 06/16/2017    INR 1.15 (H) 06/11/2017           Michael Kinsey PA-C   2/26/2020  Pager: [phone]

## 2020-03-06 DIAGNOSIS — F41.9 ANXIETY AND DEPRESSION: ICD-10-CM

## 2020-03-06 DIAGNOSIS — F32.A ANXIETY AND DEPRESSION: ICD-10-CM

## 2020-03-06 NOTE — TELEPHONE ENCOUNTER
"Requested Prescriptions   Pending Prescriptions Disp Refills     hydrOXYzine (ATARAX) 25 MG tablet [Pharmacy Med Name: HYDROXYZINE HCL 25 MG TABLET]    Last Written Prescription Date:  2.25.20  Last Fill Quantity: 90 tablet,  # refills: 0   Last office visit: 1/28/2020 with prescribing provider:  PATRICIA Jalloh       Future Office Visit:   Next 5 appointments (look out 90 days)    Mar 17, 2020  2:20 PM CDT  Office Visit with Nayeli Castorena PA-C  [facility] [address] [place] [id number] [phone]   Mar 17, 2020  3:50 PM CDT  Return Visit with Michael Kinsey PA-C  [facility] ([facility]) [address] [place] [id number] [phone] OPT 2            90 tablet 0     Sig: TAKE 1 TABLET BY MOUTH EVERY 6 HOURS AS NEEDED FOR ANXIETY       Antihistamines Protocol Passed - 3/6/2020  2:13 PM        Passed - Recent (12 mo) or future (30 days) visit within the authorizing provider's specialty     Patient has had an office visit with the authorizing provider or a provider within the authorizing providers department within the previous 12 mos or has a future within next 30 days. See \"Patient Info\" tab in inbasket, or \"Choose Columns\" in Meds & Orders section of the refill encounter.              Passed - Patient is age 3 or older     Apply age and/or weight-based dosing for peds patients age 3 and older.    Forward request to provider for patients under the age of 3.          Passed - Medication is active on med list        furosemide (LASIX) 20 MG tablet [Pharmacy Med Name: FUROSEMIDE 20 MG TABLET]        Last Written Prescription Date:  2.26.20  Last Fill Quantity: 90 tablet,  # refills: 0   Last office visit: 1/28/2020 with prescribing provider:  PATRICIA Jalloh           Future Office Visit:   Next 5 appointments (look out 90 days)    Mar 17, 2020  2:20 PM CDT  Office " "Visit with Nayeli Castorena PA-C  [facility] [address] [place] [phone]   Mar 17, 2020  3:50 PM CDT  Return Visit with Michael Kinsey PA-C  [facility] ([facility]) [id number]  [place] [phone] OPT 2            90 tablet 0     Sig: TAKE 1 TABLET BY MOUTH EVERY DAY       Diuretics (Including Combos) Protocol Failed - 3/6/2020  2:13 PM        Failed - Normal serum creatinine on file in past 12 months     Recent Labs   Lab Test 02/26/20  1510   CR 1.50*              Passed - Blood pressure under 140/90 in past 12 months     BP Readings from Last 3 Encounters:   02/26/20 97/67   02/15/20 (!) 129/92   01/31/20 103/69                 Passed - Recent (12 mo) or future (30 days) visit within the authorizing provider's specialty     Patient has had an office visit with the authorizing provider or a provider within the authorizing providers department within the previous 12 mos or has a future within next 30 days. See \"Patient Info\" tab in inbasket, or \"Choose Columns\" in Meds & Orders section of the refill encounter.              Passed - Medication is active on med list        Passed - Patient is age 18 or older        Passed - No active pregancy on record        Passed - Normal serum potassium on file in past 12 months     Recent Labs   Lab Test 02/26/20  1510   POTASSIUM 3.5                    Passed - Normal serum sodium on file in past 12 months     Recent Labs   Lab Test 02/26/20  1510                 Passed - No positive pregnancy test in past 12 months        "

## 2020-03-08 ENCOUNTER — TRANSFERRED RECORDS (OUTPATIENT)
Dept: HEALTH INFORMATION MANAGEMENT | Facility: CLINIC | Age: 75
End: 2020-03-08

## 2020-03-08 LAB
ALT SERPL-CCNC: 15 U/L (ref 14–63)
AST SERPL-CCNC: 19 U/L (ref 15–37)
CREAT SERPL-MCNC: 1.16 MG/DL (ref 0.51–0.95)
GFR SERPL CREATININE-BSD FRML MDRD: 46 ML/MIN/1.73ME2 (ref 60–150)
GLUCOSE SERPL-MCNC: 101 MG/DL (ref 74–100)
POTASSIUM SERPL-SCNC: 3.5 MMOL/L (ref 3.5–5.1)

## 2020-03-08 NOTE — ED NOTES
Chart accessed at request of Riley at [facility] for emergent need of discharge summary of patients most recent visit.  Discharge summary faxed.

## 2020-03-09 NOTE — TELEPHONE ENCOUNTER
Refill per RN protocol for the atarax     Needs to verify on lasix - its listed as historical      Attempt # 1    Called #   Telephone Information:   Mobile [phone]       Left a non detailed VM     Ryann Harrison RN, BSN  Opp Triage           Ryann Harrison RN, BSN  Opp Triage

## 2020-03-10 ENCOUNTER — TELEPHONE (OUTPATIENT)
Dept: CARDIOLOGY | Facility: CLINIC | Age: 75
End: 2020-03-10

## 2020-03-10 RX ORDER — HYDROXYZINE HYDROCHLORIDE 25 MG/1
TABLET, FILM COATED ORAL
Qty: 90 TABLET | Refills: 0 | Status: SHIPPED | OUTPATIENT
Start: 2020-03-10 | End: 2020-03-20

## 2020-03-10 RX ORDER — FUROSEMIDE 20 MG
TABLET ORAL
Qty: 90 TABLET | Refills: 0 | OUTPATIENT
Start: 2020-03-10

## 2020-03-10 NOTE — TELEPHONE ENCOUNTER
90 days of furosemide was sent on 1/28/2020, will await hospital f/u visit to refill to ensure no changes.    Has appt with me on 3/17      Nayeli Castorena MBA, MS, PA-C

## 2020-03-10 NOTE — TELEPHONE ENCOUNTER
Call from patient's daughter, she states patient had a mix-up of her medications and took too many of several. She is currently in the [facility] for care. Daughter wanted to clarify the last change made at the February 26 visit to the lasix dose. Per dictation, patient was to decrease lasix 20mg to every other day. Daughter expressed thanks for update.

## 2020-03-10 NOTE — TELEPHONE ENCOUNTER
cardiology note 2/26/20    Assessment and Plan:   This is a very pleasant 74-year-old female with past medical history notable for moderately severe aortic valve stenosis (mean gradient of 25 mmHg with a valve area of .98 cm  with peak transaortic velocity of 3.4 m/s in the setting of preserved LVEF; dimensionless index of 0.29), mild ascending aortic dilatation, alcohol dependence (drinks 3 to 4 glasses of hard liquor every day; she was referred to chemical dependency recently by her primary care provider), history of SVT, minimal nonobstructive CAD, and chronic mild thrombocytopenia.     Her blood pressure is low today and renal function is worse. I have asked her to decrease furosemide to one tablet every other day. She will return in a few weeks with repeat BMP beforehand. We will have her see Dr. Ugarte in 6 months with repeat echocardiogram.      Routing refill request to provider for review/approval because:  Failed protocol

## 2020-03-11 ENCOUNTER — TRANSFERRED RECORDS (OUTPATIENT)
Dept: HEALTH INFORMATION MANAGEMENT | Facility: CLINIC | Age: 75
End: 2020-03-11

## 2020-03-17 ENCOUNTER — TELEPHONE (OUTPATIENT)
Dept: CARDIOLOGY | Facility: CLINIC | Age: 75
End: 2020-03-17

## 2020-03-17 ENCOUNTER — VIRTUAL VISIT (OUTPATIENT)
Dept: CARDIOLOGY | Facility: CLINIC | Age: 75
End: 2020-03-17
Attending: PHYSICIAN ASSISTANT
Payer: COMMERCIAL

## 2020-03-17 VITALS
HEART RATE: 76 BPM | BODY MASS INDEX: 24.89 KG/M2 | DIASTOLIC BLOOD PRESSURE: 60 MMHG | WEIGHT: 145 LBS | SYSTOLIC BLOOD PRESSURE: 98 MMHG

## 2020-03-17 DIAGNOSIS — I35.0 AORTIC VALVE STENOSIS, ETIOLOGY OF CARDIAC VALVE DISEASE UNSPECIFIED: ICD-10-CM

## 2020-03-17 DIAGNOSIS — I50.32 CHRONIC DIASTOLIC CONGESTIVE HEART FAILURE (H): Primary | ICD-10-CM

## 2020-03-17 DIAGNOSIS — I77.810 MILD ASCENDING AORTA DILATATION (H): ICD-10-CM

## 2020-03-17 PROCEDURE — 99212 OFFICE O/P EST SF 10 MIN: CPT | Mod: 95 | Performed by: PHYSICIAN ASSISTANT

## 2020-03-17 RX ORDER — BACLOFEN 10 MG/1
0.5 TABLET ORAL 2 TIMES DAILY
COMMUNITY
End: 2020-03-21

## 2020-03-17 NOTE — PROGRESS NOTES
"Kavitha Headley is a 74 year old female who is being evaluated via a billable telephone visit.      The patient has been notified of following:     \"This telephone visit will be conducted via a call between you and your physician/provider. We have found that certain health care needs can be provided without the need for a physical exam.  This service lets us provide the care you need with a short phone conversation.  If a prescription is necessary we can send it directly to your pharmacy.  If lab work is needed we can place an order for that and you can then stop by our lab to have the test done at a later time.    If during the course of the call the physician/provider feels a telephone visit is not appropriate, you will not be charged for this service.\"     History of Present Illness:  Kavitha Headley is a 74-year-old female with past medical history notable for moderately severe aortic valve stenosis (mean gradient of 25 mmHg with a valve area of .98 cm  with peak transaortic velocity of 3.4 m/s in the setting of preserved LVEF; dimensionless index of 0.29; ALEJANDRA in 2/2020 showed moderate aortic valve stenosis with mean gradient of 33 mmHg and peak velocity of 3.2 m/sec. RENE was 1.1 cm2. She has preserved biventricular function. This was reviewed with Dr. Ugarte who recommended monitoring), mild ascending aortic dilatation (4.1 cm), alcohol dependence (drinks 3 to 4 glasses of hard liquor every day; she was referred to chemical dependency recently by her primary care provider), history of SVT, minimal nonobstructive CAD, and chronic mild thrombocytopenia.    During last visit we decreased furosemide to 20 mg every other day due to renal insufficiency. We are scheduled today to discuss how she is doing. She unfortunately was admitted at [facility] in the interim due to anticholinergic overdose. She is currently at rehab. She is planned to go home tomorrow.     She tells me she is doing well. She has no new concerns or " questions.     I have reviewed and updated the patient's Past Medical History, Social History, Family History and Medication List.    ALLERGIES  Bactrim [sulfamethoxazole w/trimethoprim]; Codeine; and Morphine    Assessment/Plan:  Kavitha Headley is a 74-year-old female with past medical history notable for moderately severe aortic valve stenosis (mean gradient of 25 mmHg with a valve area of .98 cm  with peak transaortic velocity of 3.4 m/s in the setting of preserved LVEF; dimensionless index of 0.29; ALEJANDRA in 2/2020 showed moderate aortic valve stenosis with mean gradient of 33 mmHg and peak velocity of 3.2 m/sec. RENE was 1.1 cm2. She has preserved biventricular function. This was reviewed with Dr. Ugarte who recommended monitoring), mild ascending aortic dilatation (4.1 cm), alcohol dependence (drinks 3 to 4 glasses of hard liquor every day; she was referred to chemical dependency recently by her primary care provider), history of SVT, minimal nonobstructive CAD, and chronic mild thrombocytopenia.    She appears to be doing well from cardiac standpoint. She did have labs recently and this shows improvement of her renal function. She was scheduled for lab tomorrow but I instructed her to cancel this as it is not needed anymore. She will follow up with Dr. Ugarte in 5 months with repeat echocardiogram.       Phone call contact time  Call Started at 3:58 pm  Call Ended at 4:07 pm    Michael Kinsey PA-C   3/17/2020  Pager: [phone]

## 2020-03-17 NOTE — TELEPHONE ENCOUNTER
OV today changed to phone visit. Patient agrees . Will have BMP done and then return home and wait for call.

## 2020-03-18 ENCOUNTER — TELEPHONE (OUTPATIENT)
Dept: FAMILY MEDICINE | Facility: CLINIC | Age: 75
End: 2020-03-18

## 2020-03-18 NOTE — TELEPHONE ENCOUNTER
Reason for Call:  Other-  call back    Detailed comments: Pt needs a follow up appt -   plz call PT back to advise on this  Phone Number Patient can be reached at: Cell number on file:    Telephone Information:   Mobile [phone]       Best Time: any     Can we leave a detailed message on this number?  Yes     Call taken on 3/18/2020 at 12:58 PM by Mile Menendez

## 2020-03-19 DIAGNOSIS — F32.A ANXIETY AND DEPRESSION: ICD-10-CM

## 2020-03-19 DIAGNOSIS — F41.9 ANXIETY AND DEPRESSION: ICD-10-CM

## 2020-03-19 NOTE — TELEPHONE ENCOUNTER
Patient returning call    Stated she has been in the hospital for the past 2 weeks and was advised to Follow-up with PCP.   Patient was in hospital for overdose on baclofen.   No Follow-up labs needed, just to touch base with PCP.     Patient stated she is feeling good, no symptoms.   DENIES: cough, fever, SOB, travel, exposure     Advised Telephone Visit - Patient stated an understanding and agreed with plan.  Next 5 appointments (look out 90 days)    Mar 24, 2020  3:00 PM CDT  Telephone Visit with Nayeli Castorena PA-C  [facility] [address] [place] [phone]        Advised patient that if new or worsening symptoms appear (reviewed new & worsening symptoms) to call the clinic or be seen in the the ER  Patient stated an understanding and agreed with plan.    Sara Williamson RN  [facility]

## 2020-03-19 NOTE — TELEPHONE ENCOUNTER
Attempt #1  Called patient @ # below - Left a non-detailed message to call back and speak with any triage nurse.    Sara Williamson RN  [facility]

## 2020-03-20 ENCOUNTER — TELEPHONE (OUTPATIENT)
Dept: FAMILY MEDICINE | Facility: CLINIC | Age: 75
End: 2020-03-20

## 2020-03-20 RX ORDER — HYDROXYZINE HYDROCHLORIDE 25 MG/1
TABLET, FILM COATED ORAL
Qty: 90 TABLET | Refills: 0 | Status: SHIPPED | OUTPATIENT
Start: 2020-03-20 | End: 2020-04-14

## 2020-03-20 NOTE — TELEPHONE ENCOUNTER
Reason for Call:  Other call back    Detailed comments: Hospital visit - got out yesterday.  Would like to go over medications to confirm that she is  taking the correct stuff. She had all medication brought to the hospital, but not some of the medication has been lost and PT is not sure what to do .       Phone Number Patient can be reached at: Cell number on file:    Telephone Information:   Mobile [phone]       Best Time: any    Can we leave a detailed message on this number?   Yes       Call taken on 3/20/2020 at 12:08 PM by Mile Menendez

## 2020-03-20 NOTE — TELEPHONE ENCOUNTER
"HYDROXYZINE HCL 25 MG TABLET   Last Written Prescription Date:  3/10/2020  Last Fill Quantity: 90,  # refills: 0   Last office visit: 1/28/2020 with prescribing provider:  Nayeli Castorena PA-C   Future Office Visit: 3/24/2020  Next 5 appointments (look out 90 days)    Mar 24, 2020  3:00 PM CDT  Telephone Visit with Nayeli Castorena PA-C  [facility] ([facility]) [address] [place] [id number] [phone]           Requested Prescriptions   Pending Prescriptions Disp Refills     hydrOXYzine (ATARAX) 25 MG tablet [Pharmacy Med Name: HYDROXYZINE HCL 25 MG TABLET] 90 tablet 0     Sig: TAKE 1 TABLET BY MOUTH EVERY 6 HOURS AS NEEDED FOR ANXIETY       Antihistamines Protocol Passed - 3/19/2020  6:22 PM        Passed - Recent (12 mo) or future (30 days) visit within the authorizing provider's specialty     Patient has had an office visit with the authorizing provider or a provider within the authorizing providers department within the previous 12 mos or has a future within next 30 days. See \"Patient Info\" tab in inbasket, or \"Choose Columns\" in Meds & Orders section of the refill encounter.              Passed - Patient is age 3 or older     Apply age and/or weight-based dosing for peds patients age 3 and older.    Forward request to provider for patients under the age of 3.          Passed - Medication is active on med list           ///  "

## 2020-03-21 DIAGNOSIS — K70.9 LIVER DISEASE, CHRONIC, DUE TO ALCOHOL (H): ICD-10-CM

## 2020-03-21 DIAGNOSIS — G89.4 CHRONIC PAIN SYNDROME: Primary | ICD-10-CM

## 2020-03-21 RX ORDER — FUROSEMIDE 20 MG
TABLET ORAL
Qty: 90 TABLET | Refills: 1 | Status: SHIPPED | OUTPATIENT
Start: 2020-03-21 | End: 2020-04-24

## 2020-03-21 RX ORDER — BACLOFEN 10 MG/1
5 TABLET ORAL 2 TIMES DAILY
Qty: 60 TABLET | Refills: 1 | Status: SHIPPED | OUTPATIENT
Start: 2020-03-21 | End: 2020-06-12

## 2020-03-21 NOTE — TELEPHONE ENCOUNTER
Patient has hospital f/u on 3/24/2020.  Does not have enough meds to make it until the phone visit  Maria Dolores Fox CMA

## 2020-03-23 NOTE — PROGRESS NOTES
"  SUBJECTIVE:                                                    Kavitha Headley is a 74 year old female who is being evaluated via a telephone visit.      The patient has been notified of following:     \"This telephone visit will be conducted via a call between you and your physician/provider. We have found that certain health care needs can be provided without the need for a physical exam.  This service lets us provide the care you need with a short phone conversation.  If a prescription is necessary we can send it directly to your pharmacy.  If lab work is needed we can place an order for that and you can then stop by our lab to have the test done at a later time.    We will bill your insurance company for this service.  Please check with your medical insurance if this type of visit is covered. You may be responsible for the cost of this type of visit if insurance coverage is denied.  The typical cost is $30 (10min), $59 (11-20min) and $85 (21-30min).  Most often these visits are shorter than 10 minutes.    If during the course of the call the physician/provider feels a telephone visit is not appropriate, you will not be charged for this service.\"       Consent has been obtained for this service by care team member: yes.   See the scanned image in the medical record.    Kavitha Headley complains of  Hospital F/U          Hospital Follow-up Visit:    Hospital/Nursing Home/IP Rehab Facility: [facility]  then was transferred to extended care in [facility] in [place]   Date of Admission: 3/3/2020  Date of Discharge: 3/8/2020  Reason(s) for Admission: Accidental Overdose of Baclofen, accidental - couldn't sleep.  Took 20 mg and then repeated this in addition to hydroxyzine and trazodone.             Problems taking medications regularly:  None       Medication changes since discharge: None       Problems adhering to non-medication therapy:  None    Summary of hospitalization:  See outside records, " "reviewed and scanned  Diagnostic Tests/Treatments reviewed.  Follow up needed: none  Other Healthcare Providers Involved in Patient s Care:         Specialist appointment - psychiatry  Update since discharge: improved. Still having some PTSD from hospitalization and \"thrashing around\".  Is reaching out     Post Discharge Medication Reconciliation: discharge medications reconciled, continue medications without change.  Plan of care communicated with patient     Coding guidelines for this visit:  Type of Medical   Decision Making Face-to-Face Visit       within 7 Days of discharge Face-to-Face Visit        within 14 days of discharge   Moderate Complexity [id number]   High Complexity [id number]              I have reviewed and updated the patient's Past Medical History, Social History, Family History and Medication List.    ALLERGIES  Bactrim [sulfamethoxazole w/trimethoprim]; Codeine; and Morphine    Assessment/Plan:  Kavitha was seen today for hospital f/u.    Diagnoses and all orders for this visit:    Baclofen overdose, accidental or unintentional, subsequent encounter  PTSD (post-traumatic stress disorder)  Markedly improved.  Strongly recommended close follow up with psychiatry to determine if medication adjustments needed.  Counseled on multiple hypnotics and pharmacodynamics in her age group.  Recommended pill box to avoid accidental overdose in the future.        I have reviewed the note as documented above.  This accurately captures the substance of my conversation with the patient, Kavitha Headley      Total time of call between patient and provider was 7:44 minutes         Return in about 1 week (around 3/31/2020) for psychiatry.      Nayeli Castorena, MS, PACastilloC    [facility]  "

## 2020-03-24 ENCOUNTER — VIRTUAL VISIT (OUTPATIENT)
Dept: FAMILY MEDICINE | Facility: CLINIC | Age: 75
End: 2020-03-24
Payer: COMMERCIAL

## 2020-03-24 DIAGNOSIS — F43.10 PTSD (POST-TRAUMATIC STRESS DISORDER): ICD-10-CM

## 2020-03-24 DIAGNOSIS — T42.8X1D: Primary | ICD-10-CM

## 2020-03-24 PROCEDURE — G2012 BRIEF CHECK IN BY MD/QHP: HCPCS | Performed by: PHYSICIAN ASSISTANT

## 2020-03-26 DIAGNOSIS — G89.4 CHRONIC PAIN SYNDROME: Primary | ICD-10-CM

## 2020-03-26 RX ORDER — GABAPENTIN 300 MG/1
300 CAPSULE ORAL EVERY MORNING
Qty: 90 CAPSULE | Refills: 1 | Status: SHIPPED | OUTPATIENT
Start: 2020-03-26 | End: 2020-04-27

## 2020-03-26 NOTE — TELEPHONE ENCOUNTER
gabapentin (NEURONTIN) 300 MG capsule   Last Written Prescription Date:  na  Last Fill Quantity: na,   # refills: na  Last Office Visit:   Future Office visit:   3/24/2020 with    Nayeli Castorena PA-C       Routing refill request to provider for review/approval because:  Drug not on the [facility], [facility] or [facility] refill protocol or controlled substance  Medication is reported/historical    Requested Prescriptions   Pending Prescriptions Disp Refills     gabapentin (NEURONTIN) 300 MG capsule       Sig: Take 1 capsule (300 mg) by mouth every morning       There is no refill protocol information for this order

## 2020-03-26 NOTE — TELEPHONE ENCOUNTER
Problem List Complete:  Yes    Controlled substance agreement:   Encounter-Level CSA - 12/12/2016:    Controlled Substance Agreement - Scan on 12/16/2016  5:16 PM: CONTROLLED SUBSTANCE AGREEMENT     Patient-Level CSA:    There are no patient-level csa.       Last Urine Drug Screen: No results found for: CDAUT, No results found for: COMDAT, No results found for: THC13, PCP13, COC13, MAMP13, OPI13, AMP13, BZO13, TCA13, MTD13, BAR13, OXY13, PPX13, BUP13     [email or web address]    Routing refill request to provider for review/approval because:  Drug not on the [facility] refill protocol         Sara Williamson RN  [facility]

## 2020-04-10 DIAGNOSIS — F32.A ANXIETY AND DEPRESSION: ICD-10-CM

## 2020-04-10 DIAGNOSIS — F41.9 ANXIETY AND DEPRESSION: ICD-10-CM

## 2020-04-14 RX ORDER — HYDROXYZINE HYDROCHLORIDE 25 MG/1
TABLET, FILM COATED ORAL
Qty: 90 TABLET | Refills: 1 | Status: SHIPPED | OUTPATIENT
Start: 2020-04-14 | End: 2020-06-12

## 2020-04-24 DIAGNOSIS — K70.9 LIVER DISEASE, CHRONIC, DUE TO ALCOHOL (H): ICD-10-CM

## 2020-04-24 RX ORDER — FUROSEMIDE 20 MG
TABLET ORAL
Qty: 90 TABLET | Refills: 1 | Status: SHIPPED | OUTPATIENT
Start: 2020-04-24 | End: 2020-06-12

## 2020-04-27 DIAGNOSIS — G89.4 CHRONIC PAIN SYNDROME: ICD-10-CM

## 2020-04-27 DIAGNOSIS — Z79.899 CONTROLLED SUBSTANCE AGREEMENT SIGNED: Primary | ICD-10-CM

## 2020-04-27 RX ORDER — GABAPENTIN 300 MG/1
CAPSULE ORAL
Qty: 90 CAPSULE | Refills: 1 | Status: SHIPPED | OUTPATIENT
Start: 2020-04-27 | End: 2020-07-01

## 2020-04-27 NOTE — TELEPHONE ENCOUNTER
Controlled Substance Refill Request for Gabapentin 300mg  Problem List Complete:  Yes    Info in   Last Written Prescription Date:  03/26/2020  Last Fill Quantity: 90,   # refills: 1    THE MOST RECENT OFFICE VISIT MUST BE WITHIN THE PAST 3 MONTHS. AT LEAST ONE FACE TO FACE VISIT MUST OCCUR EVERY 6 MONTHS. ADDITIONAL VISITS CAN BE VIRTUAL.  (THIS STATEMENT SHOULD BE DELETED.)    Last Office Visit with [facility] [place] care provider: 03/24/2020 - Virtual    Future Office visit:     Controlled substance agreement:   Encounter-Level CSA - 12/12/2016:    Controlled Substance Agreement - Scan on 12/16/2016  5:16 PM: CONTROLLED SUBSTANCE AGREEMENT     Patient-Level CSA:    There are no patient-level csa.         Last Urine Drug Screen: No results found for: CDAUT, No results found for: COMDAT, No results found for: THC13, PCP13, COC13, MAMP13, OPI13, AMP13, BZO13, TCA13, MTD13, BAR13, OXY13, PPX13, BUP13     Processing:  Rx to be electronically transmitted to pharmacy by provider - [facility] - [place], [place] - [address] [place] [address]     [email or web address]   checked in past 3 months?  No, route to RN

## 2020-04-27 NOTE — TELEPHONE ENCOUNTER
gabapentin (NEURONTIN) 300 MG capsule           Last Written Prescription Date:  3.26.20  Last Fill Quantity: 90 capsule,  # refills: 1   Last office visit: 3/24/2020 with prescribing provider:  PATRICIA Jalloh       Future Office Visit:            Routing refill request to provider for review/approval because:  Drug not on the [facility], [facility] or [place] refill protocol or controlled substance

## 2020-06-11 DIAGNOSIS — G89.4 CHRONIC PAIN SYNDROME: ICD-10-CM

## 2020-06-11 RX ORDER — GABAPENTIN 300 MG/1
CAPSULE ORAL
Qty: 90 CAPSULE | Refills: 11 | OUTPATIENT
Start: 2020-06-11

## 2020-06-11 NOTE — TELEPHONE ENCOUNTER
Attempt #1  Called patient @ [phone] - Left a non-detailed message to call back.    If patient calls back, please schedule a virtual visit now for pain and office visit for preventative care in August with PCPRajesh and close encounter.     Sara Williamson RN  [facility]

## 2020-06-11 NOTE — TELEPHONE ENCOUNTER
Scheduled Pt for px 9/8/20 at 1:20pm with PCP-Raffaele.    Scheduled virtual visit with Maria Dolores Mccormack CNP 6/12/20 at 1:10pm.    Closing encounter as requested.

## 2020-06-11 NOTE — TELEPHONE ENCOUNTER
Outpatient Medication Detail      Disp  Refills  Start  End  RIKKI    gabapentin (NEURONTIN) 300 MG capsule  90 capsule  1  4/27/2020   No    Sig: TAKE 1 CAPSULE THREE TIMES A DAY      Problem List Complete:  Yes    [place] Visit [place] [facility] [place]: 03/24/20 VV    Future Office visit:     Controlled substance agreement:   Encounter-Level CSA - 12/12/2016:    Controlled Substance Agreement - Scan on 12/16/2016  5:16 PM: CONTROLLED SUBSTANCE AGREEMENT     Patient-Level CSA:    There are no patient-level csa.       Last Urine Drug Screen: No results found for: CDAUT, No results found for: COMDAT, No results found for: THC13, PCP13, COC13, MAMP13, OPI13, AMP13, BZO13, TCA13, MTD13, BAR13, OXY13, PPX13, BUP13    [email or web address]    Routing refill request to provider for review/approval because:  Drug not on the [facility] refill protocol         Sara Williamson RN  [facility]

## 2020-06-11 NOTE — TELEPHONE ENCOUNTER
Due for follow up on chronic pain.      Please set up virtual visit for pain and office visit for preventative care in August with PCP-Raffaele.           Maria Dolores Mccormack, CHRISTINAP-BC

## 2020-06-12 ENCOUNTER — VIRTUAL VISIT (OUTPATIENT)
Dept: FAMILY MEDICINE | Facility: CLINIC | Age: 75
End: 2020-06-12
Payer: COMMERCIAL

## 2020-06-12 DIAGNOSIS — M54.50 CHRONIC LEFT-SIDED LOW BACK PAIN WITHOUT SCIATICA: ICD-10-CM

## 2020-06-12 DIAGNOSIS — M48.00 SPINAL STENOSIS, UNSPECIFIED SPINAL REGION: ICD-10-CM

## 2020-06-12 DIAGNOSIS — G89.4 CHRONIC PAIN SYNDROME: ICD-10-CM

## 2020-06-12 DIAGNOSIS — F32.A ANXIETY AND DEPRESSION: ICD-10-CM

## 2020-06-12 DIAGNOSIS — G89.29 CHRONIC LEFT-SIDED LOW BACK PAIN WITHOUT SCIATICA: ICD-10-CM

## 2020-06-12 DIAGNOSIS — F32.0 MILD MAJOR DEPRESSION (H): ICD-10-CM

## 2020-06-12 DIAGNOSIS — F10.21 ALCOHOL DEPENDENCE IN REMISSION (H): Primary | ICD-10-CM

## 2020-06-12 DIAGNOSIS — I50.32 CHRONIC DIASTOLIC CONGESTIVE HEART FAILURE (H): ICD-10-CM

## 2020-06-12 DIAGNOSIS — F41.9 ANXIETY AND DEPRESSION: ICD-10-CM

## 2020-06-12 DIAGNOSIS — K70.9 LIVER DISEASE, CHRONIC, DUE TO ALCOHOL (H): ICD-10-CM

## 2020-06-12 PROCEDURE — 99214 OFFICE O/P EST MOD 30 MIN: CPT | Mod: TEL | Performed by: NURSE PRACTITIONER

## 2020-06-12 PROCEDURE — 96127 BRIEF EMOTIONAL/BEHAV ASSMT: CPT | Mod: TEL | Performed by: NURSE PRACTITIONER

## 2020-06-12 RX ORDER — ATENOLOL 25 MG/1
25 TABLET ORAL EVERY MORNING
Qty: 90 TABLET | Refills: 1 | Status: CANCELLED | OUTPATIENT
Start: 2020-06-12

## 2020-06-12 RX ORDER — HYDROXYZINE HYDROCHLORIDE 25 MG/1
25 TABLET, FILM COATED ORAL EVERY 6 HOURS
Qty: 90 TABLET | Refills: 1 | Status: SHIPPED | OUTPATIENT
Start: 2020-06-12 | End: 2020-06-19

## 2020-06-12 RX ORDER — BACLOFEN 10 MG/1
5 TABLET ORAL 2 TIMES DAILY
Qty: 90 TABLET | Refills: 1 | Status: SHIPPED | OUTPATIENT
Start: 2020-06-12 | End: 2020-10-09

## 2020-06-12 RX ORDER — CITALOPRAM HYDROBROMIDE 20 MG/1
20 TABLET ORAL DAILY
Qty: 90 TABLET | Refills: 1 | Status: CANCELLED | OUTPATIENT
Start: 2020-06-12

## 2020-06-12 RX ORDER — FUROSEMIDE 20 MG
TABLET ORAL
Qty: 90 TABLET | Refills: 1 | Status: SHIPPED | OUTPATIENT
Start: 2020-06-12 | End: 2020-12-27

## 2020-06-12 RX ORDER — GABAPENTIN 300 MG/1
CAPSULE ORAL
Qty: 90 CAPSULE | Refills: 1 | Status: CANCELLED | OUTPATIENT
Start: 2020-06-12

## 2020-06-12 RX ORDER — BUPROPION HYDROCHLORIDE 150 MG/1
150 TABLET ORAL EVERY MORNING
Qty: 90 TABLET | Refills: 1 | Status: CANCELLED | OUTPATIENT
Start: 2020-06-12

## 2020-06-12 ASSESSMENT — ANXIETY QUESTIONNAIRES
GAD7 TOTAL SCORE: 3
3. WORRYING TOO MUCH ABOUT DIFFERENT THINGS: SEVERAL DAYS
7. FEELING AFRAID AS IF SOMETHING AWFUL MIGHT HAPPEN: NOT AT ALL
5. BEING SO RESTLESS THAT IT IS HARD TO SIT STILL: NOT AT ALL
IF YOU CHECKED OFF ANY PROBLEMS ON THIS QUESTIONNAIRE, HOW DIFFICULT HAVE THESE PROBLEMS MADE IT FOR YOU TO DO YOUR WORK, TAKE CARE OF THINGS AT HOME, OR GET ALONG WITH OTHER PEOPLE: NOT DIFFICULT AT ALL
2. NOT BEING ABLE TO STOP OR CONTROL WORRYING: NOT AT ALL
1. FEELING NERVOUS, ANXIOUS, OR ON EDGE: SEVERAL DAYS
6. BECOMING EASILY ANNOYED OR IRRITABLE: SEVERAL DAYS

## 2020-06-12 ASSESSMENT — PATIENT HEALTH QUESTIONNAIRE - PHQ9
5. POOR APPETITE OR OVEREATING: NOT AT ALL
SUM OF ALL RESPONSES TO PHQ QUESTIONS 1-9: 4

## 2020-06-12 NOTE — PROGRESS NOTES
"Kavitha Headley is a 74 year old female who is being evaluated via a billable telephone visit.      The patient has been notified of following:     \"This telephone visit will be conducted via a call between you and your physician/provider. We have found that certain health care needs can be provided without the need for a physical exam.  This service lets us provide the care you need with a short phone conversation.  If a prescription is necessary we can send it directly to your pharmacy.  If lab work is needed we can place an order for that and you can then stop by our lab to have the test done at a later time.    Telephone visits are billed at different rates depending on your insurance coverage. During this emergency period, for some insurers they may be billed the same as an in-person visit.  Please reach out to your insurance provider with any questions.    If during the course of the call the physician/provider feels a telephone visit is not appropriate, you will not be charged for this service.\"    Patient has given verbal consent for Telephone visit?  Yes    What phone number would you like to be contacted at? [phone]    How would you like to obtain your AVS? Mail a copy    Subjective     Kavitha Headley is a 74 year old female who presents via phone visit today for the following health issues:    Baclofen - been taking 10mg BID versus what is prescribed 5mg BID    HPI  Hypertension Follow-up      Do you check your blood pressure regularly outside of the clinic? No     Are you following a low salt diet? Yes    Are your blood pressures ever more than 140 on the top number (systolic) OR more   than 90 on the bottom number (diastolic), for example 140/90? No    Depression and Anxiety Follow-Up    How are you doing with your depression since your last visit? Stable    How are you doing with your anxiety since your last visit?  Up and down    Are you having other symptoms that might be associated with " "depression or anxiety? Yes:  Not sleeping well - Trazadone does help a little    Have you had a significant life event? No     Do you have any concerns with your use of alcohol or other drugs? Alcohol has been up and down - has been drinking daily self medicating to sleep - is determined to get back on track with not drinking    Had been previously sober. Had not drank since Oct. 2019. Recently started again in March. Committed to getting it under control \"wean herself\" off within the week she has been drinking 8pm three drinks before bed around 2 am. Only sleeps from 2-6am  Significant other tays with her on weekends he is a recovery alcoholic and he is a good support and wants her to stop drinking again all together. Daughter is a good support. Group sessions with [facility]oon not currently meeting due to COVID. Her pain is what is keeping her up at night. She does not think trazodone is helpful. She intermittently takes an extra lasix dose so she is in need of a refill. She has not reached out to cardiology about this.     PHQ 6/26/2019 12/4/2019 6/12/2020   PHQ-9 Total Score 18 4 4   Q9: Thoughts of better off dead/self-harm past 2 weeks Not at all Not at all Not at all     NITHIN-7 SCORE 6/26/2019 12/4/2019 6/12/2020   Total Score - - -   Total Score 13 1 3     Chronic Pain Follow-Up    Where in your body do you have pain? Back - mid-across and lower left side   How has your pain affected your ability to work? Not applicable  Which of these pain treatments have you tried since your last clinic visit? Other: nothing new - Ibuprofen and TENS unit  How well are you sleeping? Poor  How has your mood been since your last visit? Better  Have you had a significant life event? No  Other aggravating factors: lifting   Taking medication as directed? Yes - gabapentin -- Baclofen -NO- thought was to take 1 full tablet- rx was for 0.5 tablet BID    Had CDI injection of low back this Monday from Dr. Duenas. Does not wish to " continue care with [facility].     No flowsheet data found.  Encounter-Level CSA - 12/12/2016:    Controlled Substance Agreement - Scan on 12/16/2016  5:16 PM: CONTROLLED SUBSTANCE AGREEMENT     Patient-Level CSA:    There are no patient-level csa.         Patient Active Problem List   Diagnosis     Spinal stenosis     Chronic insomnia     CKD (chronic kidney disease) stage 3, GFR 30-59 ml/min (H)     Hip joint replacement status     Knee joint replacement status     Chronic pain syndrome     Bariatric surgery status     Mixed hyperlipidemia     Personal history of urinary calculi     Macrocytic anemia     Anxiety     Aortic stenosis     Left-sided low back pain without sciatica     PAC (premature atrial contraction)     Gastroesophageal reflux disease, esophagitis presence not specified     Controlled substance agreement signed     Seasonal affective disorder (H)     HTN, goal below 140/90     Hyponatremia     Cellulitis     Depression, unspecified depression type     Vitamin B12 deficiency (non anemic)     Severe alcohol dependence (H)     Anxiety and depression     Liver disease, chronic, due to alcohol (H)     Paroxysmal supraventricular tachycardia (H)     Coronary artery disease involving native coronary artery of native heart without angina pectoris     Mild ascending aorta dilatation (H)     Psoriasis - on Humira - Dr. Gauri Clark with dermatology     Aortic dilatation (H)     Mild major depression (H)     Thiamine deficiency     Herpes simplex virus infection     Alcohol dependence in remission (H)     Hallux rigidus of right foot     CHF (congestive heart failure) (H)     Past Surgical History:   Procedure Laterality Date     APPENDECTOMY  3/2004    incidental     ARTHRODESIS TOE(S) Right 1/31/2020    Procedure: RIGHT FIRST METATARSAL PHALANGEAL JOINT ARTHRODESIS;  Surgeon: Steven Reyes MD;  Location: [facility]     C GASTRIC BYPASS,OBESE<100CM ARIANNA-EN-Y  1996     C MEDIASTINOSCOPY W OR WO BIOPSY  2/2008     Videomediastinoscopy and, for mediastinal adenopathy -reactive lymphoid hyperplasia     C REPAIR OF RECTOCELE  3/2012     C TOTAL KNEE ARTHROPLASTY  12/2005    left      CARPAL TUNNEL RELEASE RT/LT  10/2010    Carpometacarpal excisional arthroplasty with a fascial autograft and APL suspension sling (18300). 2. Left thumb metacarpophalangeal joint fusion with autologous bone graft (98285). 3. Left endoscopic carpal tunnel release      CHOLECYSTECTOMY, LAPOROSCOPIC  11/2010    Cholecystectomy, Laparoscopic     COLONOSCOPY N/A 9/8/2016    Procedure: COMBINED COLONOSCOPY, SINGLE OR MULTIPLE BIOPSY/POLYPECTOMY BY BIOPSY;  Surgeon: Moe Barlow MD;  Location:  GI     CYSTOCELE REPAIR  11/2012    davinci laparoscopic sacrocolpopexy, enterocele repair, lysis of adhesions, placement of retropubic mid urethral sling, cystoscopy     CYSTOSCOPY, LITHOTRIPSY, COMBINED  6/2006    Left extracorporeal shock wave lithotripsy, cystoscopy, left ureteral stent placement.     CYSTOSCOPY, REMOVE STENT(S), COMBINED  7/2006    Cystoscopy, removal of left ureteral stent, retrograde pyelography, flexible and rigid ureteroscopy and holmium laser lithotripsy, basket removal of stone fragments, ureteral stent placement.      ENDOSCOPIC ULTRASOUND UPPER GASTROINTESTINAL TRACT (GI) N/A 6/12/2017    Procedure: ENDOSCOPIC ULTRASOUND, ESOPHAGOSCOPY / UPPER GASTROINTESTINAL TRACT (GI);  ENDOSCOPIC ULTRASOUND, ESOPHAGOSCOPY / UPPER GASTROINTESTINAL TRACT (GI);  Surgeon: Parth Graham MD;  Location:  GI     HERNIA REPAIR  4/2012    bilateral augmentation mastopexy, ventral hernia repair, and medial thigh liposuction on 04/06/2012.      HYSTERECTOMY VAGINAL, BILATERAL SALPINGO-OOPHERECTOMY, COMBINED  1998    due to myoma and bleeding     JOINT REPLACEMENT, HIP RT/LT  4/2004    right total hip arthroplasty     LAPAROTOMY, LYSIS ADHESIONS, COMBINED  3/2004    lysis adhesions, ventral hernia repair, appendectomy incidentally     LYMPH NODE  "BIOPSY  4/2008    right axillary, reactive follicular and paracortical hyperplasia.     MAMMOPLASTY AUGMENTATION BILATERAL  4/2012     REPAIR HAMMER TOE Right 1/31/2020    Procedure: WITH SECOND AND THIRD CLAW TOE RECONSTRUCTION;  Surgeon: Steven Reyes MD;  Location: [facility]     REVISE RECONSTRUCTED BREAST  6/7/2012    Left breast capsulotomy.        Social History     Tobacco Use     Smoking status: Never Smoker     Smokeless tobacco: Never Used   Substance Use Topics     Alcohol use: Not Currently     Alcohol/week: 63.0 standard drinks     Types: 63 Standard drinks or equivalent per week     Comment: three \"3oz\" liquor drinks nightly/ sober since October     Family History   Problem Relation Age of Onset     Substance Abuse Father      Cancer Father         throat and lung mets     Diabetes No family hx of      Coronary Artery Disease No family hx of      Cerebrovascular Disease No family hx of          Current Outpatient Medications   Medication Sig Dispense Refill     aspirin 81 MG EC tablet Take 81 mg by mouth daily       aspirin-acetaminophen-caffeine (EXCEDRIN MIGRAINE) 250-250-65 MG tablet Take 2 tablets by mouth daily as needed for headaches       atenolol (TENORMIN) 25 MG tablet Take 1 tablet (25 mg) by mouth every morning 90 tablet 1     baclofen (LIORESAL) 10 MG tablet Take 0.5 tablets (5 mg) by mouth 2 times daily 90 tablet 1     BIOTIN PO Take 1 tablet by mouth every morning       buPROPion (WELLBUTRIN XL) 150 MG 24 hr tablet Take 1 tablet (150 mg) by mouth every morning 90 tablet 1     citalopram (CELEXA) 20 MG tablet Take 1 tablet (20 mg) by mouth daily 90 tablet 1     folic acid (FOLVITE) 1 MG tablet Take 1 mg by mouth daily       furosemide (LASIX) 20 MG tablet TAKE 1 TABLET DAILY 90 tablet 1     gabapentin (NEURONTIN) 300 MG capsule TAKE 1 CAPSULE THREE TIMES A DAY 90 capsule 1     HUMIRA PEN-PS/UV/ADOL HS START 40 MG/0.8ML pen kit Inject 0.8 mLs (40 mg) Subcutaneous every 14 days  0     " hydrOXYzine (ATARAX) 25 MG tablet Take 1 tablet (25 mg) by mouth every 6 hours 90 tablet 1     Multiple Vitamins-Minerals (CENTRUM SILVER) per tablet Take 1 tablet by mouth daily       polyethylene glycol (MIRALAX/GLYCOLAX) Packet Take 17 g by mouth daily as needed (constipation) 7 packet      senna-docusate (SENOKOT-S;PERICOLACE) 8.6-50 MG per tablet Take 1-2 tablets by mouth 2 times daily as needed for constipation 100 tablet      STATIN NOT PRESCRIBED, INTENTIONAL, Please choose reason not prescribed, below       traZODone (DESYREL) 100 MG tablet Take 100 mg by mouth At Bedtime       vitamin C (ASCORBIC ACID) 250 MG tablet Take 250 mg by mouth daily         Reviewed and updated as needed this visit by Provider  Tobacco  Allergies  Meds  Problems  Med Hx  Surg Hx  Fam Hx         Review of Systems   Constitutional, HEENT, cardiovascular, pulmonary, GI, , musculoskeletal, neuro, skin, endocrine and psych systems are negative, except as otherwise noted in the HPI.    Objective   Reported vitals:  There were no vitals taken for this visit.   healthy, alert and no distress  PSYCH: Alert and oriented times 3; coherent speech, normal   rate and volume, able to articulate logical thoughts, able   to abstract reason, no tangential thoughts, no hallucinations   or delusions  Her affect is normal and pleasant  RESP: No cough, no audible wheezing, able to talk in full sentences  Remainder of exam unable to be completed due to telephone visits        Assessment/Plan:  1. Mild major depression (H)  2. Anxiety and depression  No concerns.  Stable.  Continue same medication this was refilled today.  - hydrOXYzine (ATARAX) 25 MG tablet; Take 1 tablet (25 mg) by mouth every 6 hours  Dispense: 90 tablet; Refill: 1    3. Chronic pain syndrome  7. Chronic left-sided low back pain without sciatica  8. Spinal stenosis, unspecified spinal region  Discussed baclofen at length.  She is to take half a tablet twice daily; no more  than 30 tablets within the month.  Encourage alcohol cessation.  Close follow-up.  She has poor control of her pain with limited sleep and affecting her ADLs.    Referral to pain management.    Referral for a new orthopedic evaluation of this.  Kavitha verbalizes understanding of plan of care and is in agreement.   - baclofen (LIORESAL) 10 MG tablet; Take 0.5 tablets (5 mg) by mouth 2 times daily  Dispense: 90 tablet; Refill: 1  - PAIN MANAGEMENT REFERRAL  - Orthopedic & Spine  Referral; Future    4. Liver disease, chronic, due to alcohol (H)  5. Alcohol dependence in remission (H)  Strongly encourage alcohol cessation.  She is not to take her baclofen, Neurontin, trazodone or hydroxyzine with alcohol.   She was offered and declines a specialist/addiction medicine referral at this time. She wants to wean back to no alcohol herself which her goal is to have this accomplished within the week.  Encourage her to reach out to see if there is a virtual option for AA group.  She is to let me know if she continues to struggle with this and I will help connect her to a supportive group.   Continue to closely monitor.     6. Chronic diastolic congestive heart failure (H)  Encourage her to reach out to cardiology to discuss lasix usage and need for follow up labs or evaluation if needed.   - furosemide (LASIX) 20 MG tablet; TAKE 1 TABLET DAILY  Dispense: 90 tablet; Refill: 1    Return in about 4 weeks (around 7/10/2020) for Recheck.      Phone call duration: 27  Minutes 48 seconds      BHARATI Clark-BC

## 2020-06-12 NOTE — PATIENT INSTRUCTIONS
Patient Education     Understanding Chronic Pain  Chronic means ongoing. Pain is called chronic when it lasts over a long period of time, at least 3 months. This includes pain that you feel regularly, even if it comes and goes. Chronic pain may be due to continuing injury or disease. Or it may be due to problems with the body s pain-control system. An example of this is fibromyalgia.      Chronic stimulus  Chronic pain may be from ongoing arousal of the body's pain system. The cause may be an untreated injury or health problem. Common examples of these are:     Joint degeneration (arthritis)    Back injury    Nervous system damage (neuropathic pain)    Headaches  With this type of pain, both the pain and the condition that is causing it must be treated.  Chronic pain syndrome  In some cases, no cause can be found for a person's chronic pain. Some people with chronic pain develop chronic pain syndrome.  In addition to the pain, this can include:     Anxiety    Depression    Anger    Changed lifestyle  It is important to talk with your healthcare team about these. You will need treatment for these problems in addition to treatment for pain.  Date Last Reviewed:     3935-1525 [employer]. [address] [place] [address], [place] [address]. All rights reserved. This information is not intended as a substitute for professional medical care. Always follow your healthcare professional's instructions.

## 2020-06-13 ASSESSMENT — ANXIETY QUESTIONNAIRES: GAD7 TOTAL SCORE: 3

## 2020-06-15 ENCOUNTER — TELEPHONE (OUTPATIENT)
Dept: PALLIATIVE MEDICINE | Facility: CLINIC | Age: 75
End: 2020-06-15

## 2020-06-15 NOTE — TELEPHONE ENCOUNTER
Order received for a new eval:           Are there any red flags that may impact the assessment or management of the patient? Active or recent alcohol, drug or prescription medication misuse (explain): recent relapse of alcoholism. Taking baclofen incorrectly.           Routing to review          Candy Burns    [facility]

## 2020-06-15 NOTE — PROGRESS NOTES
[phone]  [phone]    Pharmacy Address and Hours     Address  Hours    [address] [facility] [address]             Please call [facility] and cancel her Baclofen additional refill. I realized I had a refill on this script unintentionally. I want her to see a pain clinic and also have close follow up. I am okay with her #90 day fill (no more than 30 in a month)but I need the refill cancelled please.       Thanks,       Maria Dolores Mccormack, CHRISTIANP-BC

## 2020-06-15 NOTE — TELEPHONE ENCOUNTER
Note sent to referring, including concern over stopped baclofen abuptly with next refill.    Ce Hoffman MD  [facility]

## 2020-06-15 NOTE — LETTER
July 21, 2020    Kavitha Headley  [address]  [facility] [address]    Dear Kavitha                                                                 Welcome to the [facility].  We are located at [address] [place] [address] [address] [place] [address]. Your appointment at the [facility] has been scheduled on July 31st at 9:00 AM with Ketan Garcia MD . This will be a video visit so you DO NOT need to come to the clinic.    At your first visit, you will meet your team of caregivers who will help you to develop pain management strategies that will last a lifetime. You will meet with our support staff to review your insurance information, and collect your co-payment if required by your insurance company. You will also meet with a medical pain specialist and care coordinator who will assess your pain and develop a plan of care for your successful pain rehabilitation. You should expect to spend approximately 1 hour at your first visit with us. Usually, patients work with us for a period of 6-12 months, and eventually return to their primary doctor once their pain management has stabilized.      To help us make your visit go as smoothly as possible, please mail back the following items with you on your visit:       Completed Pain Questionnaire enclosed in this packet.  If you do not mail back the completed questionnaire, we may have to reschedule your appointment.      Due to the demand for new patient evaluations, you must notify the scheduling department 48 hours (2 days) in advance if you are not able to keep this appointment. Failure to do so could affect your ability to reschedule with our clinic. Please be aware that we will not prescribe any medications at your first visit.     Please call [phone] with any questions regarding your appointment. We look forward to meeting you and working to address your health care needs.     Sincerely,    [facility]  [facility] [place]

## 2020-06-16 NOTE — PROGRESS NOTES
[facility] - they cancled the refill that was attached to the baclofen    Ryann Harrison RN, BSN  [facility]

## 2020-06-19 DIAGNOSIS — F32.A ANXIETY AND DEPRESSION: ICD-10-CM

## 2020-06-19 DIAGNOSIS — F41.9 ANXIETY AND DEPRESSION: ICD-10-CM

## 2020-06-19 RX ORDER — HYDROXYZINE HYDROCHLORIDE 25 MG/1
TABLET, FILM COATED ORAL
Qty: 90 TABLET | Refills: 1 | Status: SHIPPED | OUTPATIENT
Start: 2020-06-19 | End: 2020-10-23

## 2020-06-19 NOTE — TELEPHONE ENCOUNTER
Prescription approved per [facility] Refill Protocol.  Change in pharmacy    Cecy Rollins RN, BSN  [facility]

## 2020-06-25 ENCOUNTER — TRANSFERRED RECORDS (OUTPATIENT)
Dept: HEALTH INFORMATION MANAGEMENT | Facility: CLINIC | Age: 75
End: 2020-06-25

## 2020-07-01 DIAGNOSIS — G89.4 CHRONIC PAIN SYNDROME: ICD-10-CM

## 2020-07-01 RX ORDER — GABAPENTIN 300 MG/1
CAPSULE ORAL
Qty: 90 CAPSULE | Refills: 1 | Status: SHIPPED | OUTPATIENT
Start: 2020-07-01 | End: 2020-09-24

## 2020-07-01 NOTE — TELEPHONE ENCOUNTER
Routing refill request to provider for review/approval because:  Drug not on the [facility] refill protocol     Ashly CANNON RN  EP Triage

## 2020-07-01 NOTE — TELEPHONE ENCOUNTER
Reason for Call:  Medication or medication refill:    Do you use a [facility]?  Name of the pharmacy and phone number for the current request:    [facility] - [place], [place] - [address] [place] [address]     Name of the medication requested:   gabapentin (NEURONTIN) 300 MG capsule     Other request: None     Can we leave a detailed message on this number? YES    Phone number patient can be reached at: Cell number on file:    Telephone Information:   Mobile [phone]       Best Time: anytime     Call taken on 7/1/2020 at 12:31 PM by LIBBY URIZ

## 2020-07-01 NOTE — TELEPHONE ENCOUNTER
gabapentin (NEURONTIN) 300 MG capsule         Last Written Prescription Date:  4.27.20  Last Fill Quantity: 90 capsule,  # refills: 1   Last office visit: 6/12/2020 with prescribing provider:  jose e Mccormack     Future Office Visit:   Next 5 appointments (look out 90 days)    Sep 08, 2020  1:20 PM CDT  PHYSICAL with Nayeli Castorena PA-C  [facility] [address] [place] [id number]  [phone]               Routing refill request to provider for review/approval because:  Drug not on the [facility], [facility] or  Health refill protocol or controlled substance

## 2020-07-20 ENCOUNTER — OFFICE VISIT (OUTPATIENT)
Dept: NEUROSURGERY | Facility: CLINIC | Age: 75
End: 2020-07-20
Attending: PHYSICIAN ASSISTANT
Payer: COMMERCIAL

## 2020-07-20 ENCOUNTER — ANCILLARY PROCEDURE (OUTPATIENT)
Dept: GENERAL RADIOLOGY | Facility: CLINIC | Age: 75
End: 2020-07-20
Attending: PHYSICIAN ASSISTANT
Payer: COMMERCIAL

## 2020-07-20 ENCOUNTER — DOCUMENTATION ONLY (OUTPATIENT)
Dept: FAMILY MEDICINE | Facility: CLINIC | Age: 75
End: 2020-07-20

## 2020-07-20 VITALS
HEIGHT: 64 IN | SYSTOLIC BLOOD PRESSURE: 137 MMHG | OXYGEN SATURATION: 99 % | BODY MASS INDEX: 27.49 KG/M2 | WEIGHT: 161 LBS | HEART RATE: 64 BPM | DIASTOLIC BLOOD PRESSURE: 80 MMHG

## 2020-07-20 DIAGNOSIS — G89.29 CHRONIC BILATERAL LOW BACK PAIN WITHOUT SCIATICA: Primary | ICD-10-CM

## 2020-07-20 DIAGNOSIS — G89.29 CHRONIC BILATERAL LOW BACK PAIN WITHOUT SCIATICA: ICD-10-CM

## 2020-07-20 DIAGNOSIS — M54.50 CHRONIC BILATERAL LOW BACK PAIN WITHOUT SCIATICA: Primary | ICD-10-CM

## 2020-07-20 DIAGNOSIS — M54.50 CHRONIC BILATERAL LOW BACK PAIN WITHOUT SCIATICA: ICD-10-CM

## 2020-07-20 PROCEDURE — 72100 X-RAY EXAM L-S SPINE 2/3 VWS: CPT

## 2020-07-20 PROCEDURE — G0463 HOSPITAL OUTPT CLINIC VISIT: HCPCS

## 2020-07-20 PROCEDURE — 99203 OFFICE O/P NEW LOW 30 MIN: CPT | Performed by: PHYSICIAN ASSISTANT

## 2020-07-20 ASSESSMENT — PAIN SCALES - GENERAL: PAINLEVEL: MODERATE PAIN (4)

## 2020-07-20 ASSESSMENT — MIFFLIN-ST. JEOR: SCORE: 1205.29

## 2020-07-20 NOTE — NURSING NOTE
"Kavitha Headley is a 74 year old female who presents for:  Chief Complaint   Patient presents with     Neurologic Problem     chronic left side LBP         Initial Vitals:  /80   Pulse 64   Ht 5' 4\" (1.626 m)   Wt 161 lb (73 kg)   SpO2 99%   BMI 27.64 kg/m   Estimated body mass index is 27.64 kg/m  as calculated from the following:    Height as of this encounter: 5' 4\" (1.626 m).    Weight as of this encounter: 161 lb (73 kg).. Body surface area is 1.82 meters squared. BP completed using cuff size: regular  Moderate Pain (4)    Nursing Comments: Patient presents w/ LBP radiates left to hip    Alfred Montes MA  "

## 2020-07-20 NOTE — TELEPHONE ENCOUNTER
New note sent to referring.  With last communication, PCP was going to discuss CD evaluation with patient, so we were not scheduling.      I will see where things are at now.    Ce Hoffman MD  [facility]

## 2020-07-20 NOTE — PROGRESS NOTES
Neurosurgery Consult    HPI    Ms. Headley is a 74-year-old female furred to us for evaluation of chronic low back pain and intrascapular pain.  She is tried physical therapy and injections in the past without significant improvement.  She denies any radicular symptoms.  She has been working with [facility] orthopedics recently and wanted to get an fresh perspective on her symptoms.  She was also recently given a referral to the pain clinic here at [facility] but she has not yet followed up on this.  She has not had any recent imaging of her low back.    Medical history  Alcohol dependence   chronic pain syndrome   hypertension  Chronic kidney disease        Social history  Alcohol dependence      B/P: 137/80, T: [Did not read[, P: 64, R: Data Unavailable       Exam    Alert and oriented no acute distress  Bilateral lower extremities with 5/5 strength  Reflexes 2+ patella/ankle  Negative straight leg raise bilaterally  Negative ankle clonus negative Babinski bilaterally  Lumbar spine tender to palpation on the right  Able to stand on heels and toes  Gait is normal  No pain with internal or external rotation of the hips    Imaging    No recent imaging    Assessment    Low back pain  Intrascapular pain    Plan:    We will obtain a lumbar x-ray AP and lateral and follow-up with the patient once we have the results and make further recommendations at that time.    Total time of 30 minutes spent with the patient today greater than 50% spent face to face in counseling and coordination of care.

## 2020-07-20 NOTE — PROGRESS NOTES
Called to check on patient to see how she was doing with her baclofen decreasing and her alcohol intake.  She reports she is not drinking alcohol.  She also reports she has decreased the baclofen down to 5 mg only as needed for severe pain.  She does not take this daily and does not take it more than once a day.  We will reach out to pain clinic with this information and have them follow-up with her to set up an appointment for the pain clinic.        Maria Dolores Mccormack, BHARATI-BC

## 2020-07-20 NOTE — LETTER
7/20/2020         RE: Kavitha Headley  [address] [facility] [address]        Dear Colleague,    Thank you for referring your patient, Kavitha Headley, to the [facility] NEUROS[facility]GERY [facility]INIC. Please see a copy of my visit note below.    Neurosurgery Consult    HPI    Ms. Headley is a 74-year-old female furred to us for evaluation of chronic low back pain and intrascapular pain.  She is tried physical therapy and injections in the past without significant improvement.  She denies any radicular symptoms.  She has been working with [facility] orthopedics recently and wanted to get an fresh perspective on her symptoms.  She was also recently given a referral to the pain clinic here at [place] but she has not yet followed up on this.  She has not had any recent imaging of her low back.    Medical history  Alcohol dependence   chronic pain syndrome   hypertension  Chronic kidney disease        Social history  Alcohol dependence      B/P: 137/80, T: [Did not read[, P: 64, R: Data Unavailable       Exam    Alert and oriented no acute distress  Bilateral lower extremities with 5/5 strength  Reflexes 2+ patella/ankle  Negative straight leg raise bilaterally  Negative ankle clonus negative Babinski bilaterally  Lumbar spine tender to palpation on the right  Able to stand on heels and toes  Gait is normal  No pain with internal or external rotation of the hips    Imaging    No recent imaging    Assessment    Low back pain  Intrascapular pain    Plan:    We will obtain a lumbar x-ray AP and lateral and follow-up with the patient once we have the results and make further recommendations at that time.    Total time of 30 minutes spent with the patient today greater than 50% spent face to face in counseling and coordination of care.    Again, thank you for allowing me to participate in the care of your patient.        Sincerely,        Kym Ivey PA-C

## 2020-07-20 NOTE — TELEPHONE ENCOUNTER
Pt calling to schedule new evaluation. Routing to follow up on review of order.    Michelle RICE    [facility] Management

## 2020-07-21 ENCOUNTER — TELEPHONE (OUTPATIENT)
Dept: NEUROSURGERY | Facility: CLINIC | Age: 75
End: 2020-07-21

## 2020-07-21 NOTE — TELEPHONE ENCOUNTER
Pt scheduled 7/31 for a video visit.    Michelle RICE    [facility] Pain Management

## 2020-07-21 NOTE — TELEPHONE ENCOUNTER
----- Message from Kym Ivey PA-C sent at 7/21/2020  3:36 PM CDT -----  Regarding: Xray  I reviewed her xray. It shows degenerative arthritis. I recommend she meet with the pain clinic for a comprehensive eval, referral has been made. Follow up with us as needed.     ----------------------------------------  Informed patient of the above and she verbalized understanding and agreement. She already has an appointment with the pain clinic.

## 2020-07-21 NOTE — TELEPHONE ENCOUNTER
Pain Management Center Referral      1. Confirmed address with patient? Yes  2. Confirmed phone number with patient? Yes  3. Confirmed referring provider? Yes  4. Is the PCP the same as the referring provider? Yes  5. Has the patient been to any previous pain clinics? Yes  (If yes, send SREEKANTH with welcome letter)  6. Which insurance are we to bill for this appointment?  [employer]    7. Informed pt of cancellation (48 hour) policy? Yes    REGARDING OPIOID MEDICATIONS: We will always address appropriateness of opioid pain medications, but we generally will not automatically take on a prescribing role. When we do take on prescribing of opioids for chronic pain, it is in collaboration with the referring physician for an intermediate period of time (months), with an expectation that the primary physician or provider will assume the prescribing role if medications are effective at stable doses with demonstrated compliance. Therefore, please do not assume that your prescribing responsibilities end on the day of pain clinic consultation.  8. Informed pt of prescribing policy? Yes    9.Please be aware that once you are established with a pain provider and location, you will need to continue have all future visits with that provider and location. It is best to determine what location is the most convenient for you and schedule with that one.    ** PATIENT INFORMED OF THIS POLICY Yes      9. Referring Provider: Nayeli Castorena     10. Criteria for Triage Eval:   -Missed/Failed 1st appointment? N/A     -Medication Focused? N/A     -Mental Health Concerns? (e.g. Recent psych hospitalization/snap shot)? N/A     -Active substance abuse? N/A     -Patient behaviors (e.g. Offensive language/raised voice)? N/A    Michelle RICE    [facility] Management

## 2020-07-21 NOTE — TELEPHONE ENCOUNTER
Spoke with PCP  Patient has stopped using alcohol, and is ready for pain management visit.    Ok to schedule.  Provider can continue to monitor.    Ce Hoffman MD  [facility]

## 2020-07-31 ENCOUNTER — VIRTUAL VISIT (OUTPATIENT)
Dept: PALLIATIVE MEDICINE | Facility: CLINIC | Age: 75
End: 2020-07-31
Payer: COMMERCIAL

## 2020-07-31 DIAGNOSIS — Z53.9 NO SHOW: Primary | ICD-10-CM

## 2020-08-17 ENCOUNTER — HOSPITAL ENCOUNTER (EMERGENCY)
Facility: CLINIC | Age: 75
Discharge: HOME | End: 2020-08-17
Attending: EMERGENCY MEDICINE | Admitting: EMERGENCY MEDICINE
Payer: COMMERCIAL

## 2020-08-17 VITALS
DIASTOLIC BLOOD PRESSURE: 68 MMHG | HEART RATE: 80 BPM | HEIGHT: 64 IN | BODY MASS INDEX: 27.64 KG/M2 | OXYGEN SATURATION: 97 % | RESPIRATION RATE: 16 BRPM | SYSTOLIC BLOOD PRESSURE: 140 MMHG | TEMPERATURE: 97.8 F

## 2020-08-17 DIAGNOSIS — F10.10 ALCOHOL ABUSE: ICD-10-CM

## 2020-08-17 DIAGNOSIS — E87.1 HYPONATREMIA: ICD-10-CM

## 2020-08-17 LAB
ALBUMIN SERPL-MCNC: 4.7 G/DL (ref 3.4–5)
ALP SERPL-CCNC: 92 U/L (ref 40–150)
ALT SERPL W P-5'-P-CCNC: 19 U/L (ref 0–50)
ANION GAP SERPL CALCULATED.3IONS-SCNC: 7 MMOL/L (ref 3–14)
AST SERPL W P-5'-P-CCNC: 29 U/L (ref 0–45)
BASOPHILS # BLD AUTO: 0 10E9/L (ref 0–0.2)
BASOPHILS NFR BLD AUTO: 0.7 %
BILIRUB SERPL-MCNC: 0.4 MG/DL (ref 0.2–1.3)
BUN SERPL-MCNC: 14 MG/DL (ref 7–30)
CALCIUM SERPL-MCNC: 10.2 MG/DL (ref 8.5–10.1)
CHLORIDE SERPL-SCNC: 91 MMOL/L (ref 94–109)
CO2 SERPL-SCNC: 30 MMOL/L (ref 20–32)
CREAT SERPL-MCNC: 0.98 MG/DL (ref 0.52–1.04)
DIFFERENTIAL METHOD BLD: NORMAL
EOSINOPHIL # BLD AUTO: 0.2 10E9/L (ref 0–0.7)
EOSINOPHIL NFR BLD AUTO: 3.4 %
ERYTHROCYTE [DISTWIDTH] IN BLOOD BY AUTOMATED COUNT: 14.6 % (ref 10–15)
GFR SERPL CREATININE-BSD FRML MDRD: 56 ML/MIN/{1.73_M2}
GLUCOSE SERPL-MCNC: 96 MG/DL (ref 70–99)
HCT VFR BLD AUTO: 35.4 % (ref 35–47)
HGB BLD-MCNC: 11.7 G/DL (ref 11.7–15.7)
IMM GRANULOCYTES # BLD: 0 10E9/L (ref 0–0.4)
IMM GRANULOCYTES NFR BLD: 0.2 %
LYMPHOCYTES # BLD AUTO: 1.5 10E9/L (ref 0.8–5.3)
LYMPHOCYTES NFR BLD AUTO: 27.6 %
MCH RBC QN AUTO: 29.9 PG (ref 26.5–33)
MCHC RBC AUTO-ENTMCNC: 33.1 G/DL (ref 31.5–36.5)
MCV RBC AUTO: 91 FL (ref 78–100)
MONOCYTES # BLD AUTO: 0.9 10E9/L (ref 0–1.3)
MONOCYTES NFR BLD AUTO: 17.3 %
NEUTROPHILS # BLD AUTO: 2.7 10E9/L (ref 1.6–8.3)
NEUTROPHILS NFR BLD AUTO: 50.8 %
NRBC # BLD AUTO: 0 10*3/UL
NRBC BLD AUTO-RTO: 0 /100
PLATELET # BLD AUTO: 405 10E9/L (ref 150–450)
POTASSIUM SERPL-SCNC: 3.8 MMOL/L (ref 3.4–5.3)
PROT SERPL-MCNC: 9.1 G/DL (ref 6.8–8.8)
RBC # BLD AUTO: 3.91 10E12/L (ref 3.8–5.2)
SODIUM SERPL-SCNC: 128 MMOL/L (ref 133–144)
WBC # BLD AUTO: 5.4 10E9/L (ref 4–11)

## 2020-08-17 PROCEDURE — 96365 THER/PROPH/DIAG IV INF INIT: CPT

## 2020-08-17 PROCEDURE — 85025 COMPLETE CBC W/AUTO DIFF WBC: CPT | Performed by: EMERGENCY MEDICINE

## 2020-08-17 PROCEDURE — 25000125 ZZHC RX 250: Performed by: EMERGENCY MEDICINE

## 2020-08-17 PROCEDURE — 25800030 ZZH RX IP 258 OP 636: Performed by: EMERGENCY MEDICINE

## 2020-08-17 PROCEDURE — 25000128 H RX IP 250 OP 636: Performed by: EMERGENCY MEDICINE

## 2020-08-17 PROCEDURE — 96366 THER/PROPH/DIAG IV INF ADDON: CPT

## 2020-08-17 PROCEDURE — 99284 EMERGENCY DEPT VISIT MOD MDM: CPT | Mod: 25

## 2020-08-17 PROCEDURE — 80053 COMPREHEN METABOLIC PANEL: CPT | Performed by: EMERGENCY MEDICINE

## 2020-08-17 PROCEDURE — 25000132 ZZH RX MED GY IP 250 OP 250 PS 637: Performed by: EMERGENCY MEDICINE

## 2020-08-17 RX ORDER — LORAZEPAM 1 MG/1
1 TABLET ORAL ONCE
Status: COMPLETED | OUTPATIENT
Start: 2020-08-17 | End: 2020-08-17

## 2020-08-17 RX ADMIN — FOLIC ACID: 5 INJECTION, SOLUTION INTRAMUSCULAR; INTRAVENOUS; SUBCUTANEOUS at 18:18

## 2020-08-17 RX ADMIN — LORAZEPAM 1 MG: 1 TABLET ORAL at 17:35

## 2020-08-17 ASSESSMENT — ENCOUNTER SYMPTOMS
DECREASED CONCENTRATION: 1
SEIZURES: 0
TREMORS: 1

## 2020-08-17 NOTE — ED TRIAGE NOTES
Pt states going through alcohol withdrawal. Usually has 6 glasses demetrice per day, no drinks since Saturday. Wants help to stop drinking. Last withdrawal 1 year ago. Shaky, brain fog. No hx of seizures

## 2020-08-17 NOTE — ED AVS SNAPSHOT
Emergency Department  [address] [facility] [address]  Phone:  [phone]  Fax:  [phone]                                    Kavitha Headley   MRN: [id number]    Department:   Emergency Department   Date of Visit:  8/17/2020           After Visit Summary Signature Page    I have received my discharge instructions, and my questions have been answered. I have discussed any challenges I see with this plan with the nurse or doctor.    ..........................................................................................................................................  Patient/Patient Representative Signature      ..........................................................................................................................................  Patient Representative Print Name and Relationship to Patient    ..................................................               ................................................  Date                                   Time    ..........................................................................................................................................  Reviewed by Signature/Title    ...................................................              ..............................................  Date                                               Time          22EPIC Rev 08/18

## 2020-08-17 NOTE — ED PROVIDER NOTES
"  History     Chief Complaint:  Withdrawal    HPI   Kavitha Headley is a 74 year old female with a history of alcohol abuse who presents with alcohol withdrawal. The patient's last drink was 2 days ago, and states she wants help to stop drinking. The patient reports shakiness and \"brain fog\". She usually drinks 6 glasses of demetrice per day.  She had an episode recently where she was so intoxicated that she blacked out anddoes not remember the day.  This made her very concerned and prompted her to stop drinking. Her last withdrawal was a year ago, and she does not have a history of seizures.    Allergies:  Sulfamethoxazole W/Trimethoprim  Codeine  Morphine  Hydromorphone     Medications:    Excedrin  Tenormin  Lioresal  Wellbutrin  Celexa  Lasix  Neurontin  Humira Pen   Atarax  Senokot  Desyrel     Past Medical History:    Alcohol abuse  Anxiety  Ascending aorta dilatation  Benign hypertension  Insomnia  Chronic pain syndrome  Coronary artery disease involving native coronary artery of native heart without angina pectoris  GERD  Kidney stones  Liver disease due to alcohol  Macrocytic anemia  Major depressive disorder  Mixed hyperlipidemia  Moderate aortic stenosis  Pelvic relaxation disorder  Urinary calculi  Psoriasis  Premature ventricular contraction  Spinal stenosis  Stage III chronic kidney disease  Supraventricular tachycardia  Left-sided low back pain without sciatica  Seasonal affective disorder  Cellulitis  Vitamin B12 deficiency  Herpes simplex virus infection  Hallux rigidus of right foot  Congestive heart failure    Past Surgical History:    Appendectomy  Arthrodesis of right first metatarsal phalangeal joint  Gastric bypass  Mediastinoscopy  Repair of rectocele  Total left knee arthroplasty  Carpal tunnel release   Cholecystectomy  Colonoscopy  Cystocele repair  Cystoscopy  Hysterectomy  Right total hip arthroplasty  Hernia repair  Laparotomy  Bilateral mammoplasty augmentation  Hammer toe repair  Left " "breast capsulotomy  Salpingo-oophorectomy  Abdominoplasty  Incontinence surgery    Family History:    Father - Throat Cancer, Lung Cancer, Substance Abuse    Social History:  The patient was unaccompanied to the ED.  Smoking Status: Never Smoker  Smokeless tobacco: Never Used  Alcohol Use: Yes  Drug Use: No  PCP:  Nayeli Castorena  Marital Status:   [5]     Review of Systems   Neurological: Positive for tremors. Negative for seizures.   Psychiatric/Behavioral: Positive for decreased concentration.   10 systems reviewed and negative except as above and in HPI.    Physical Exam     Patient Vitals for the past 24 hrs:   BP Temp Temp src Pulse Resp SpO2 Height   08/17/20 1556 (!) 151/65 97.8  F (36.6  C) Temporal 71 16 96 % 1.626 m (5' 4\")       Physical Exam  General: Resting on the gurney, appears mildly uncomfortable  Head:  The scalp, face, and head appear normal  Mouth/Throat: Mucus membranes are moist  CV:  No tachycardia.    Regular rate    Normal S1 and S2  No pathological murmur   Resp:  Breath sounds clear and equal bilaterally    Non-labored, no retractions or accessory muscle use    No coarseness    No wheezing   GI:  Abdomen is soft, no rigidity    No tenderness to palpation  MS:  Normal motor assessment of all extremities.    Good capillary refill noted.      Skin:   No rash or lesions noted.  Neuro:  Minimal tremor. Speech is normal and fluent. No apparent deficit.  Psych: Awake. Alert.  Normal affect.      Appropriate interactions.    Emergency Department Course     Laboratory:  Laboratory findings were communicated with the patient who voiced understanding of the findings.    CBC: AWNL (WBC 5.4, HGB 11.7, )  CMP:  (L), Chloride 91 (L), GFR 56 (L), Calcium 10.2 (H), Protein 9.1 (H), o/w WNL (Creatinine 0.98)    Interventions:  1735 Ativan 1 mg PO  1818 0.9% NaCl bolus 1000 mL with infuvite adult 10 mL, thamine 100 mg, folic acid 1 mg IV    Emergency Department Course:  Past medical " records, nursing notes, and vitals reviewed.    (1702)   I performed an exam of the patient as documented above. History obtained from patient.      IV was inserted and blood was drawn for laboratory testing, results above.    (1924)   I rechecked the patient.    (2026)   I rechecked the patient and discussed results and plan of care.    Findings and plan explained to the Patient. Patient discharged home with instructions regarding supportive care, medications, and reasons to return. The importance of close follow-up was reviewed.    I personally reviewed the laboratory results with the Patient and answered all related questions prior to discharge.     Impression & Plan     Medical Decision Making:  Kavitha Headley is a 74 year old female who presents for evaluation of alcohol abuse and withdrawal.  Patient has no history of Delirium tremens or alcohol withdrawal seizures.  There are no signs of trauma related to alcohol use and no further workup is needed including head CT.   Thiamine/folate given.  She is hyponatremic, but was given IVF and will recheck tomorrow with her PCP.  Detox was offered but the only bed available was [address] [place] [address].  She reports needing to be in the [facility] or [facility] system for her insurance nad she prefers to go home.      Sober ride obtained.  Alcohol counseling provided by myself and patient does want treatment resources.  DEC/SW did not evaluate patient.       Diagnosis:    ICD-10-CM    1. Alcohol abuse  F10.10    2. Hyponatremia  E87.1        Disposition:  Discharged to home.    Scribe Disclosure:  I, Tiesha Butler, am serving as a scribe at 5:06 PM on 8/17/2020 to document services personally performed by Kylie Villalba MD based on my observations and the provider's statements to me.   8/17/2020    EMERGENCY DEPARTMENT       Kylie Villalba MD  08/17/20 3534

## 2020-08-24 ENCOUNTER — HOSPITAL ENCOUNTER (OUTPATIENT)
Dept: BEHAVIORAL HEALTH | Facility: CLINIC | Age: 75
Discharge: HOME | End: 2020-08-24
Attending: FAMILY MEDICINE | Admitting: FAMILY MEDICINE
Payer: COMMERCIAL

## 2020-08-24 VITALS — HEIGHT: 64 IN | BODY MASS INDEX: 25.61 KG/M2 | WEIGHT: 150 LBS

## 2020-08-24 PROCEDURE — H0001 ALCOHOL AND/OR DRUG ASSESS: HCPCS | Mod: HF,95

## 2020-08-24 ASSESSMENT — ANXIETY QUESTIONNAIRES
6. BECOMING EASILY ANNOYED OR IRRITABLE: MORE THAN HALF THE DAYS
5. BEING SO RESTLESS THAT IT IS HARD TO SIT STILL: NOT AT ALL
2. NOT BEING ABLE TO STOP OR CONTROL WORRYING: MORE THAN HALF THE DAYS
4. TROUBLE RELAXING: NOT AT ALL
7. FEELING AFRAID AS IF SOMETHING AWFUL MIGHT HAPPEN: MORE THAN HALF THE DAYS
3. WORRYING TOO MUCH ABOUT DIFFERENT THINGS: MORE THAN HALF THE DAYS
GAD7 TOTAL SCORE: 10
1. FEELING NERVOUS, ANXIOUS, OR ON EDGE: MORE THAN HALF THE DAYS
IF YOU CHECKED OFF ANY PROBLEMS ON THIS QUESTIONNAIRE, HOW DIFFICULT HAVE THESE PROBLEMS MADE IT FOR YOU TO DO YOUR WORK, TAKE CARE OF THINGS AT HOME, OR GET ALONG WITH OTHER PEOPLE: SOMEWHAT DIFFICULT

## 2020-08-24 ASSESSMENT — PAIN SCALES - GENERAL: PAINLEVEL: SEVERE PAIN (6)

## 2020-08-24 ASSESSMENT — PATIENT HEALTH QUESTIONNAIRE - PHQ9: SUM OF ALL RESPONSES TO PHQ QUESTIONS 1-9: 14

## 2020-08-24 ASSESSMENT — MIFFLIN-ST. JEOR: SCORE: 1155.4
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
## 2020-08-24 NOTE — PROGRESS NOTES
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 relationships and available support It is not necessary to ask each item; only to get a comprehensive picture of their support system.  How often can you count on the following people when you need someone?   Partner / Spouse Always supportive   Parent(s)/Aunt(s)/Uncle(s)/Grandparents The patient's parents and other family members are .   Sibling(s)/Cousin(s) Always supportive   Child(daniel) Always supportive   Other relative(s) Always supportive   Friend(s)/neighbor(s) Always supportive   Child(daniel) s father(s)/mother(s) The patient's children(s) mother or father is .   Support group member(s) The patient denied having any current involvement with 12-step or other support group meetings.   Community of alen members Always supportive   /counselor/therapist/healer The patient denied having any current involvement with a , counselor, therapist or healer.   Other (specify) No     8A. What is your current living situation?     Patient lives alone.    8B. What is your long term plan for where you will be living?     NA    8C. Tell me about your living environment/neighborhood? Ask enough follow up questions to determine safety, criminal activity, availability of alcohol and drugs,  supportive or antagonistic to the person making changes.      No concerns.    9. Criminal justice history: Gather current/recent history and any significant history related to substance use--Arrests? Convictions? Circumstances? Alcohol or drug involvement? Sentences? Still on probation or parole? Expectations of the court? Current court order? Any sex offenses - lifetime? What level? (DSM)    None    10. What obstacles exist to participating in treatment? (Time off work, childcare, funding, transportation, pending care home time, living situation)     The patient denied having any obstacles for participating in substance abuse treatment.    Dimension VI Ratings   Recovery environment - The placing authority must use the criteria in Dimension VI to determine a client s recovery environment.   RISK DESCRIPTIONS - Severity rating: 3 Client is not engaged in structured, meaningful activity and the client's peers, family, significant other, and living environment are unsupportive, or there is significant criminal justice system involvement.    REASONS SEVERITY WAS ASSIGNED - (What support does the person have for making changes? What structure/stability does the person have in his or her daily life that will increase the likelihood that changes can be sustained? What problems exist in the person s environment that will jeopardize getting/staying clean and sober?)     Client lives alone, is in a long term relationship, and has three children who are supportive. 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 Client lacks sober support, is not engaged in any structured activities and denies any legal issue.       Client Choice/Exceptions   Would you like services specific to language, age, gender, culture, Lutheran preference, race, ethnicity, sexual orientation or disability?  No    What particular treatment choices and options would you like to have? Inpatient    Do you have a preference for a particular  treatment program? [facility], [facility] and [facility].    Criteria for Diagnosis     Criteria for Diagnosis  DSM-5 Criteria for Substance Use Disorder  Instructions: Determine whether the client currently meets the criteria for Substance Use Disorder using the diagnostic criteria in the DSM-V pp.481-589. Current means during the most recent 12 months outside a facility that controls access to substances    Category of Substance Severity (ICD-10 Code / DSM 5 Code)     Alcohol Use Disorder Severe  (10.20) (303.90)   Cannabis Use Disorder The patient does not meet the criteria for a Cannabis use disorder.   Hallucinogen Use Disorder The patient does not meet the criteria for a Hallucinogen use disorder.   Inhalant Use Disorder The patient does not meet the criteria for an Inhalant use disorder.   Opioid Use Disorder The patient does not meet the criteria for an Opioid use disorder.   Sedative, Hypnotic, or Anxiolytic Use Disorder The patient does not meet the criteria for a Sedative/Hypnotic use disorder.   Stimulant Related Disorder The patient does not meet the criteria for a Stimulant use disorder.   Tobacco Use Disorder The patient does not meet the criteria for a Tobacco use disorder.   Other (or unknown) Substance Use Disorder The patient does not meet the criteria for a Other (or unknown) Substance use disorder.       Collateral Contact Summary   Number of contacts made: NA    Contact with referring person:  No    If court related records were reviewed, summarize here: No court records had been reviewed at the time of this documentation.    Information from collateral contacts supported/largely agreed with information from the client and associated risk ratings.      Rule 25 Assessment Summary and Plan   's Recommendation    1)  Complete a residential based or similar treatment program, such as [facility], [facility] or [facility].   2)  Abstain from all mood-altering chemicals unless  "prescribed by a licensed provider.   3)  Attend, at minimum, 2 weekly support group meetings, such as 12 step based (AA/NA), SMART Recovery, Health Realizations, and/or Refuge Recovery meetings.     4)  Actively work with a female mentor/sponsor on a weekly basis.         Collateral Contacts     Name:    Kylie Villalba MD    Relationship:    Physician   Phone Number:    NA Releases:    Yes     Kavitha Headley is a 74 year old female with a history of alcohol abuse who presents with alcohol withdrawal. The patient's last drink was 2 days ago, and states she wants help to stop drinking. The patient reports shakiness and \"brain fog\". She usually drinks 6 glasses of demetrice per day.  She had an episode recently where she was so intoxicated that she blacked out anddoes not remember the day.  This made her very concerned and prompted her to stop drinking. Her last withdrawal was a year ago, and she does not have a history of seizures.      Collateral Contacts     Name:    NA   Relationship:    NA   Phone Number:    NA   Releases:    No     NA    ollateral Contacts      A problematic pattern of alcohol/drug use leading to clinically significant impairment or distress, as manifested by at least two of the following, occurring within a 12-month period:    1.) Alcohol/drug is often taken in larger amounts or over a longer period than was intended.  2.) There is a persistent desire or unsuccessful efforts to cut down or control alcohol/drug use  4.) Craving, or a strong desire or urge to use alcohol/drug  6.) Continued alcohol use despite having persistent or recurrent social or interpersonal problems caused or exacerbated by the effects of alcohol/drug.  9.) Alcohol/drug use is continued despite knowledge of having a persistent or recurrent physical or psychological problem that is likely to have been caused or exacerbated by alcohol.  10.) Tolerance, as defined by either of the following: A need for markedly increased " amounts of alcohol/drug to achieve intoxication or desired effect.  11.) Withdrawal, as manifested by either of the following: The characteristic withdrawal syndrome for alcohol/drug (refer to Criteria A and B of the criteria set for alcohol/drug withdrawal).      Specify if: In early remission:  After full criteria for alcohol/drug use disorder were previously met, none of the criteria for alcohol/drug use disorder have been met for at least 3 months but for less than 12 months (with the exception that Criterion A4,  Craving or a strong desire or urge to use alcohol/drug  may be met).     In sustained remission:   After full criteria for alcohol use disorder were previously met, non of the criteria for alcohol/drug use disorder have been met at any time during a period of 12 months or longer (with the exception that Criterion A4,  Craving or strong desire or urge to use alcohol/drug  may be met).   Specify if:   This additional specifier is used if the individual is in an environment where access to alcohol is restricted.    Mild: Presence of 2-3 symptoms  Moderate: Presence of 4-5 symptoms  Severe: Presence of 6 or more symptoms

## 2020-08-24 NOTE — ADDENDUM NOTE
[facility] on: 8/24/2020 3:51 PM   Actions taken: Clinical Note Signed

## 2020-08-25 ASSESSMENT — ANXIETY QUESTIONNAIRES: GAD7 TOTAL SCORE: 10

## 2020-08-25 NOTE — ADDENDUM NOTE
Enc[facility]: [facility] on: 8/25/2020 8:27 AM   Actions taken: Clinical Note Signed

## 2020-08-25 NOTE — ADDENDUM NOTE
[facility]: 8[facility]25/2020 9:51 AM   Actions taken: Clinical Note Signed

## 2020-08-28 ENCOUNTER — TELEPHONE (OUTPATIENT)
Dept: FAMILY MEDICINE | Facility: CLINIC | Age: 75
End: 2020-08-28

## 2020-08-28 NOTE — TELEPHONE ENCOUNTER
Medication Question or Refill    Who is calling: Pt    What medication are you calling about (include dose and sig)?:   Pt is going into treatment.  Pt is requesting suboxone for pain. Pt uses [facility]    Controlled Substance Agreement on file: No    Who prescribed the medication?: na    Do you need a refill? No    When did you use the medication last? na    Patient offered an appointment? No    Do you have any questions or concerns?  No    Requested Pharmacy: [facility]    Okay to leave a detailed message?: Yes at Home number on file [phone] (home)

## 2020-08-28 NOTE — TELEPHONE ENCOUNTER
Called # [phone]     Pt advised the providers in the [place] clinic cannot prescribe suboxone. Providers need special clearance and Pt are normally sent to pain clinics for this. Pt advised to contact pain clinic. Pt noted she missed her last appt and will have another on 9/11/20.  Pt advised if needing sooner than 9/11/20 to reach out to pain clinic. Patient stated an understanding and agreed with plan.       Pankaj Deluca RN   [facility] [place] T[facility]

## 2020-08-29 NOTE — TELEPHONE ENCOUNTER
Second attempt at trying to contact pt, but again, no answer. VM left to return my phone call. MANISH Hernandez RN.   29-Aug-2020 20:48 Attending MD Carrington: reid, h/o resp fail p sepsis, O2 90-91 on RA on arrival not on home O2, not COPD, duoneb, CXR, pending CTA C r/o PE, ext hemorr (no further work up for brbpr) (discharged with 93% last time) , review of pulm note reveals baseline O2 90%, if work up nonactionable today, may be discharged

## 2020-09-08 PROBLEM — K21.00 GASTROESOPHAGEAL REFLUX DISEASE WITH ESOPHAGITIS: Status: ACTIVE | Noted: 2020-09-08

## 2020-09-08 PROBLEM — F10.21 ALCOHOL DEPENDENCE IN REMISSION (H): Status: RESOLVED | Noted: 2020-01-28 | Resolved: 2020-09-08

## 2020-09-08 PROBLEM — F41.9 ANXIETY AND DEPRESSION: Status: RESOLVED | Noted: 2018-08-03 | Resolved: 2020-09-08

## 2020-09-08 PROBLEM — F32.A ANXIETY AND DEPRESSION: Status: RESOLVED | Noted: 2018-08-03 | Resolved: 2020-09-08

## 2020-09-08 PROBLEM — L03.90 CELLULITIS: Status: RESOLVED | Noted: 2017-06-06 | Resolved: 2020-09-08

## 2020-09-08 PROBLEM — F32.A DEPRESSION, UNSPECIFIED DEPRESSION TYPE: Status: RESOLVED | Noted: 2017-07-14 | Resolved: 2020-09-08

## 2020-09-11 ENCOUNTER — TELEPHONE (OUTPATIENT)
Dept: PALLIATIVE MEDICINE | Facility: CLINIC | Age: 75
End: 2020-09-11

## 2020-09-11 ENCOUNTER — VIRTUAL VISIT (OUTPATIENT)
Dept: PALLIATIVE MEDICINE | Facility: CLINIC | Age: 75
End: 2020-09-11
Payer: COMMERCIAL

## 2020-09-11 DIAGNOSIS — Z53.9 NO SHOW: Primary | ICD-10-CM

## 2020-09-11 NOTE — TELEPHONE ENCOUNTER
Contacted patient in response to 2nd No Show appointment at the Pain Clinic.   Called pt. LM to discuss no show/cancel policy.     Tiara ERICKSON, RN Care Coordinator  [facility]  Pain [facility]

## 2020-09-11 NOTE — TELEPHONE ENCOUNTER
Pt calling to R/S appointment. Informed pt she would need to speak with nursing to review our no show policy.    Michelle RICE    [facility] Pain [facility]

## 2020-09-11 NOTE — TELEPHONE ENCOUNTER
"Patient has \"no showed\" or cancelled numerous appointments at the last minute. Please call and review no-show policy before re-scheduling.    Thanks,    Ketan Garcia, DO  [facility]      "

## 2020-09-14 NOTE — TELEPHONE ENCOUNTER
Called Stewart Peres asking her to call us back. Need to discuss our no show policy.    Haydee Luke RN  Care Coordinator  [facility] Pain [facility]

## 2020-09-15 NOTE — TELEPHONE ENCOUNTER
Pt called in to schedule. She is waiting to speak to nursing.      Yakelin SANCHEZ    [facility] [place]

## 2020-09-15 NOTE — TELEPHONE ENCOUNTER
Contacted patient in response to 2nd No Show appointment at the Pain Clinic.       We noticed that you missed another appointment and wanted to reach out to gather more information.     Patient's Explanation: see below    Barriers identified: YES: she took a bad fall and was out of commission. She is moderately better now. Understands policy.     Plan to address barriers: YES:    Review No Show policy with patient:     If you miss 3 appointments in a 6 month period without calling the clinic to cancel, you put yourself at risk of being dismissed from the clinic.     Since you have already missed 2 appointments, another missed appointment could lead to being discharged from the clinic.     Patient acknowledged understanding of the Pain Clinic No Show policy and potential next steps if another appointment is missed? Yes    Is the patient still interested in continuing at the Pain Clinic? Yes       If yes, does the patient have concerns about ongoing attendance issues?  No     Is a sooner appointment needed with the Pain provider? No

## 2020-09-17 ENCOUNTER — TELEPHONE (OUTPATIENT)
Dept: PSYCHIATRY | Facility: CLINIC | Age: 75
End: 2020-09-17

## 2020-09-17 NOTE — TELEPHONE ENCOUNTER
PSYCHIATRY CLINIC PHONE INTAKE     SERVICES REQUESTED / INTERESTED IN          Other:  CDE    Presenting Problem and Brief History                              What would you like to be seen for? (brief description):  Pt wants to see a provider about her alcoholism. She's been struggling with her addiction for a couple of years. She's also been struggling depression for quite awhile. Pt is taking mental health medications, see file. However she wants to meet with someone who can help her with her alcohol withdrawals. She's been sober for about 4 days. She's having trouble with sleep, hard to fall asleep and stay asleep. Pt is having a little bit of symptoms of isolations and lack of appetite.   Have you received a mental health diagnosis? No   Which one (s): NA  Is there any history of developmental delay?  No   Are you currently seeing a mental health provider?  No            Who / month last seen:  NA  Do you have mental health records elsewhere?  No  Will you sign a release so we can obtain them?  No    Have you ever been hospitalized for psychiatric reasons?  No  Describe:  NA    Do you have current thoughts of self-harm?  No    Do you currently have thoughts of harming others?  No       Substance Use History     Do you have any history of alcohol / illicit drug use?  Yes  Describe:  See above  Have you ever received treatment for this?  Yes    Describe:  KiaraJohns, - she's ok with signing a release.      Social History     Who is the patient's a guardian?  No    Name / number: NA  Have you had an ACT team in last 12 months?  No  Describe: NA   Do you have any current or past legal issues?  No  Describe: NA   OK to leave a detailed voicemail?  Yes    Medical/ Surgical History                                   Patient Active Problem List   Diagnosis     Spinal stenosis     Chronic insomnia     CKD (chronic kidney disease) stage 3, GFR 30-59 ml/min (H)     Hip joint replacement status     Knee joint replacement  status     Chronic pain syndrome     Bariatric surgery status     Mixed hyperlipidemia     Personal history of urinary calculi     Macrocytic anemia     Anxiety     Aortic stenosis     Left-sided low back pain without sciatica     PAC (premature atrial contraction)     Controlled substance agreement signed     Seasonal affective disorder (H)     HTN, goal below 140/90     Hyponatremia     Vitamin B12 deficiency (non anemic)     Severe alcohol dependence (H)     Liver disease, chronic, due to alcohol (H)     Paroxysmal supraventricular tachycardia (H)     Coronary artery disease involving native coronary artery of native heart without angina pectoris     Mild ascending aorta dilatation (H)     Psoriasis - on Humira - Dr. Gauri Clark with dermatology     Aortic dilatation (H)     Mild major depression (H)     Thiamine deficiency     Herpes simplex virus infection     Hallux rigidus of right foot     CHF (congestive heart failure) (H)     Gastroesophageal reflux disease with esophagitis - chronic due to alcohol          Medications             Current Outpatient Medications   Medication Sig Dispense Refill     aspirin 81 MG EC tablet Take 81 mg by mouth daily       aspirin-acetaminophen-caffeine (EXCEDRIN MIGRAINE) 250-250-65 MG tablet Take 2 tablets by mouth daily as needed for headaches       atenolol (TENORMIN) 25 MG tablet Take 1 tablet (25 mg) by mouth every morning 90 tablet 1     baclofen (LIORESAL) 10 MG tablet Take 0.5 tablets (5 mg) by mouth 2 times daily 90 tablet 1     BIOTIN PO Take 1 tablet by mouth every morning       buPROPion (WELLBUTRIN XL) 150 MG 24 hr tablet Take 1 tablet (150 mg) by mouth every morning 90 tablet 1     citalopram (CELEXA) 20 MG tablet Take 1 tablet (20 mg) by mouth daily 90 tablet 1     folic acid (FOLVITE) 1 MG tablet Take 1 mg by mouth daily       furosemide (LASIX) 20 MG tablet TAKE 1 TABLET DAILY 90 tablet 1     gabapentin (NEURONTIN) 300 MG capsule TAKE 1 CAPSULE THREE TIMES A  DAY 90 capsule 1     HUMIRA PEN-PS/UV/ADOL HS START 40 MG/0.8ML pen kit Inject 0.8 mLs (40 mg) Subcutaneous every 14 days (Patient not taking: Reported on 7/20/2020)  0     hydrOXYzine (ATARAX) 25 MG tablet TAKE 1 TABLET BY MOUTH EVERY 6 HOURS AS NEEDED FOR ANXIETY 90 tablet 1     Multiple Vitamins-Minerals (CENTRUM SILVER) per tablet Take 1 tablet by mouth daily       polyethylene glycol (MIRALAX/GLYCOLAX) Packet Take 17 g by mouth daily as needed (constipation) 7 packet      senna-docusate (SENOKOT-S;PERICOLACE) 8.6-50 MG per tablet Take 1-2 tablets by mouth 2 times daily as needed for constipation 100 tablet      STATIN NOT PRESCRIBED, INTENTIONAL, Please choose reason not prescribed, below (Patient not taking: Reported on 7/20/2020)       traZODone (DESYREL) 100 MG tablet TAKE 1 TABLET NIGHTLY AS NEEDED FOR SLEEP 90 tablet 3     vitamin C (ASCORBIC ACID) 250 MG tablet Take 250 mg by mouth daily           DISPOSITION      9/17/20 Intake complete. Scheduled for CDE on 9/30/20 at 9:15am with .     Myranda Vera,

## 2020-09-21 ENCOUNTER — VIRTUAL VISIT (OUTPATIENT)
Dept: PSYCHIATRY | Facility: CLINIC | Age: 75
End: 2020-09-21
Attending: PSYCHOLOGIST
Payer: COMMERCIAL

## 2020-09-21 DIAGNOSIS — F10.20 ALCOHOL DEPENDENCE (H): Primary | ICD-10-CM

## 2020-09-21 NOTE — TELEPHONE ENCOUNTER
09/296/2020 appointment scheduled with Dr Radha Luke RN  Care Coordinator  [facility] Replaced by [facility]
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
## 2020-09-24 DIAGNOSIS — G89.4 CHRONIC PAIN SYNDROME: ICD-10-CM

## 2020-09-24 RX ORDER — GABAPENTIN 300 MG/1
CAPSULE ORAL
Qty: 270 CAPSULE | Refills: 0 | Status: SHIPPED | OUTPATIENT
Start: 2020-09-24 | End: 2020-12-28

## 2020-09-24 NOTE — TELEPHONE ENCOUNTER
Due for annual PX w/fasting labs.  No showed appt 9/21.  Please assist with scheduling.      Nayeli Castorena MBA, MS, PA-C

## 2020-09-24 NOTE — TELEPHONE ENCOUNTER
Outpatient Medication Detail    Disp  Refills  Start  End  RIKKI    gabapentin (NEURONTIN) 300 MG capsule  90 capsule  1  7/1/2020   No    Sig: TAKE 1 CAPSULE THREE TIMES A DAY      Problem List Complete:  Yes    Last Office Visit with [facility] primary care provider: 6/12/20 VV    Future Office visit:   Next 5 appointments (look out 90 days)    Sep 25, 2020  1:20 PM CDT  Pre-Op physical with Ziyad Alejo PA-C  [facility] [address] [place] [id number]  [phone]        Controlled substance agreement:   Encounter-Level CSA - 12/12/2016:    Controlled Substance Agreement - Scan on 12/16/2016  5:16 PM: CONTROLLED SUBSTANCE AGREEMENT     Patient-Level CSA:    There are no patient-level csa.       Last Urine Drug Screen: No results found for: CDAUT, No results found for: COMDAT, No results found for: THC13, PCP13, COC13, MAMP13, OPI13, AMP13, BZO13, TCA13, MTD13, BAR13, OXY13, PPX13, BUP13    [email or web address]    Routing refill request to provider for review/approval because:  Drug not on the [facility] refill protocol         Sara Williamson RN  [facility]

## 2020-09-25 ENCOUNTER — OFFICE VISIT (OUTPATIENT)
Dept: FAMILY MEDICINE | Facility: CLINIC | Age: 75
End: 2020-09-25
Payer: COMMERCIAL

## 2020-09-25 VITALS
OXYGEN SATURATION: 98 % | HEIGHT: 64 IN | DIASTOLIC BLOOD PRESSURE: 72 MMHG | TEMPERATURE: 98.7 F | WEIGHT: 164 LBS | BODY MASS INDEX: 28 KG/M2 | SYSTOLIC BLOOD PRESSURE: 122 MMHG | HEART RATE: 82 BPM

## 2020-09-25 DIAGNOSIS — I50.32 CHRONIC DIASTOLIC CONGESTIVE HEART FAILURE (H): ICD-10-CM

## 2020-09-25 DIAGNOSIS — N18.30 CKD (CHRONIC KIDNEY DISEASE) STAGE 3, GFR 30-59 ML/MIN (H): ICD-10-CM

## 2020-09-25 DIAGNOSIS — I77.819 AORTIC DILATATION (H): ICD-10-CM

## 2020-09-25 DIAGNOSIS — M20.20 HALLUX RIGIDUS, UNSPECIFIED LATERALITY: ICD-10-CM

## 2020-09-25 DIAGNOSIS — G89.4 CHRONIC PAIN SYNDROME: ICD-10-CM

## 2020-09-25 DIAGNOSIS — Z23 NEED FOR PROPHYLACTIC VACCINATION AND INOCULATION AGAINST INFLUENZA: ICD-10-CM

## 2020-09-25 DIAGNOSIS — I35.0 NONRHEUMATIC AORTIC VALVE STENOSIS: ICD-10-CM

## 2020-09-25 DIAGNOSIS — Z01.818 PREOP GENERAL PHYSICAL EXAM: Primary | ICD-10-CM

## 2020-09-25 DIAGNOSIS — F10.20 SEVERE ALCOHOL DEPENDENCE (H): ICD-10-CM

## 2020-09-25 LAB
BASOPHILS # BLD AUTO: 0 10E9/L (ref 0–0.2)
BASOPHILS NFR BLD AUTO: 0.6 %
DIFFERENTIAL METHOD BLD: ABNORMAL
EOSINOPHIL # BLD AUTO: 0.3 10E9/L (ref 0–0.7)
EOSINOPHIL NFR BLD AUTO: 4.6 %
ERYTHROCYTE [DISTWIDTH] IN BLOOD BY AUTOMATED COUNT: 17.6 % (ref 10–15)
HCT VFR BLD AUTO: 33 % (ref 35–47)
HGB BLD-MCNC: 10.9 G/DL (ref 11.7–15.7)
LYMPHOCYTES # BLD AUTO: 1.4 10E9/L (ref 0.8–5.3)
LYMPHOCYTES NFR BLD AUTO: 20.3 %
MCH RBC QN AUTO: 29.9 PG (ref 26.5–33)
MCHC RBC AUTO-ENTMCNC: 33 G/DL (ref 31.5–36.5)
MCV RBC AUTO: 91 FL (ref 78–100)
MONOCYTES # BLD AUTO: 0.9 10E9/L (ref 0–1.3)
MONOCYTES NFR BLD AUTO: 13.8 %
NEUTROPHILS # BLD AUTO: 4.1 10E9/L (ref 1.6–8.3)
NEUTROPHILS NFR BLD AUTO: 60.7 %
PLATELET # BLD AUTO: 317 10E9/L (ref 150–450)
RBC # BLD AUTO: 3.64 10E12/L (ref 3.8–5.2)
WBC # BLD AUTO: 6.8 10E9/L (ref 4–11)

## 2020-09-25 PROCEDURE — 93000 ELECTROCARDIOGRAM COMPLETE: CPT | Performed by: PHYSICIAN ASSISTANT

## 2020-09-25 PROCEDURE — G0008 ADMIN INFLUENZA VIRUS VAC: HCPCS | Performed by: PHYSICIAN ASSISTANT

## 2020-09-25 PROCEDURE — 90662 IIV NO PRSV INCREASED AG IM: CPT | Performed by: PHYSICIAN ASSISTANT

## 2020-09-25 PROCEDURE — 36415 COLL VENOUS BLD VENIPUNCTURE: CPT | Performed by: PHYSICIAN ASSISTANT

## 2020-09-25 PROCEDURE — 85025 COMPLETE CBC W/AUTO DIFF WBC: CPT | Performed by: PHYSICIAN ASSISTANT

## 2020-09-25 PROCEDURE — 80053 COMPREHEN METABOLIC PANEL: CPT | Performed by: PHYSICIAN ASSISTANT

## 2020-09-25 PROCEDURE — 99215 OFFICE O/P EST HI 40 MIN: CPT | Mod: 25 | Performed by: PHYSICIAN ASSISTANT

## 2020-09-25 ASSESSMENT — MIFFLIN-ST. JEOR: SCORE: 1218.9

## 2020-09-25 NOTE — PATIENT INSTRUCTIONS

## 2020-09-25 NOTE — PROGRESS NOTES
[address] [address]  Phone: [phone]  Primary Provider: Nayeli Castorena  Pre-op Performing Provider: CECIL HERRERA    PREOPERATIVE EVALUATION:  Today's date: 9/25/2020    Kavitha Headley is a 74 year old female who presents for a preoperative evaluation.    Surgical Information:  Surgery Details 9/25/2020   Surgery/Procedure: Removal of Metal from Toe   Surgery Location: [facility]   Surgeon: Dr. Aiken   Surgery Date: 9/30/2020   Time of Surgery: TBD   Where patient plans to recover: At home with family     Fax number for surgical facility:  Type of Anesthesia Anticipated: Local    Subjective     HPI related to upcoming procedure: Linda is a 75 y/o female with PMH aortic stenosis, ETOH abuse,chronic pain and CHF presents to the clinic for preoperative examination prior to hardware removal from toe.   She is feeling well today, no acute concerns.  She is following with cardiology, last echo 2/2020 ( see notes).   Preop Questions 9/25/2020   1. Have you ever had a heart attack or stroke? No   2. Have you ever had surgery on your heart or blood vessels, such as a stent placement, a coronary artery bypass, or surgery on an artery in your head, neck, heart, or legs? No   3. Do you have chest pain with activity? No   4. Do you have a history of  heart failure? No   5. Do you currently have a cold, bronchitis or symptoms of other infection? No   6. Do you have a cough, shortness of breath, or wheezing? No   7. Do you or anyone in your family have previous history of blood clots? No   8. Do you or does anyone in your family have a serious bleeding problem such as prolonged bleeding following surgeries or cuts? No   9. Have you ever had problems with anemia or been told to take iron pills? No   10. Have you had any abnormal blood loss such as black, tarry or bloody stools, or abnormal vaginal bleeding? No   11. Have you ever had a blood  transfusion? No   Are you willing to have a blood transfusion if it is medically needed before, during, or after your surgery? Yes   13. Have you or any of your relatives ever had problems with anesthesia? No   14. Do you have sleep apnea, excessive snoring or daytime drowsiness? No   15. Do you have any artifical heart valves or other implanted medical devices like a pacemaker, defibrillator, or continuous glucose monitor? No   16. Do you have artificial joints? YES - HIP   17. Are you allergic to latex? No   18. Is there any chance that you may be pregnant? No       [place] reviewed:  reviewed, patient scheduled for appointment October with pain clinic    See problem list for active medical problems.  Problems all longstanding and stable, except as noted/documented.  See ROS for pertinent symptoms related to these conditions.      Review of Systems  Constitutional, neuro, ENT, endocrine, pulmonary, cardiac, gastrointestinal, genitourinary, musculoskeletal, integument and psychiatric systems are negative, except as otherwise noted.    Patient Active Problem List    Diagnosis Date Noted     Gastroesophageal reflux disease with esophagitis - chronic due to alcohol 09/08/2020     Priority: Medium     CHF (congestive heart failure) (H) 02/11/2020     Priority: Medium     Hallux rigidus of right foot 01/31/2020     Priority: Medium     Psoriasis - on Humira - Dr. Gauri Clark with dermatology 12/04/2019     Priority: Medium     Aortic dilatation (H) 12/04/2019     Priority: Medium     Mild major depression (H) 12/04/2019     Priority: Medium     Thiamine deficiency 12/04/2019     Priority: Medium     Herpes simplex virus infection 12/04/2019     Priority: Medium     Mild ascending aorta dilatation (H)      Priority: Medium     Coronary artery disease involving native coronary artery of native heart without angina pectoris 10/16/2018     Priority: Medium     Minimal coronary artery disease on  coronary angiogram in 2015.        Liver disease, chronic, due to alcohol (H) 08/15/2018     Priority: Medium     Paroxysmal supraventricular tachycardia (H) 08/15/2018     Priority: Medium     Severe alcohol dependence (H) 02/12/2018     Priority: Medium     Vitamin B12 deficiency (non anemic) 02/06/2018     Priority: Medium     Hyponatremia 06/04/2017     Priority: Medium     Controlled substance agreement signed 12/15/2016     Priority: Medium     Seasonal affective disorder (H) 12/15/2016     Priority: Medium     HTN, goal below 140/90 12/15/2016     Priority: Medium     PAC (premature atrial contraction) 03/01/2016     Priority: Medium     Left-sided low back pain without sciatica 07/02/2015     Priority: Medium     Aortic stenosis 06/07/2015     Priority: Medium     Anxiety 07/10/2014     Priority: Medium     Chronic pain syndrome      Priority: Medium     Patient is followed by Alison Pena MD for ongoing prescription of pain medication.  All refills should be approved by this provider, or covering partner.    Medication(s): tramadol 20 mg BID prn pain.   Maximum quantity per month: 60  Clinic visit frequency required: Q 6  months     Controlled substance agreement:  Encounter-Level CSA:     There are no encounter-level csa.        Pain Clinic evaluation in the past: No    DIRE Total Score(s):  No flowsheet data found.    Last [facility] website verification:  none   [email or web address]       Bariatric surgery status      Priority: Medium     gastric bypass, [facility] and       Mixed hyperlipidemia      Priority: Medium     Macrocytic anemia      Priority: Medium     Mild macrocytic anemia, 2012 to present, likely based on alcohol abuse.       CKD (chronic kidney disease) stage 3, GFR 30-59 ml/min (H) 11/28/2012     Priority: Medium     Chronic insomnia      Priority: Medium     Spinal stenosis 12/21/2010     Priority: Medium     Personal history of urinary calculi 06/01/2006     Priority: Medium      left ureteral stone,lithotripsy       Knee joint replacement status 12/01/2005     Priority: Medium     left       Hip joint replacement status 04/01/2004     Priority: Medium     right        Past Medical History:   Diagnosis Date     Alcohol abuse     Long term alcohol abuse. Abstinenet since October 2019.     Anxiety disorder      Ascending aorta dilatation (H)      Bariatric surgery status 1996?    gastric bypass, [facility] and     Benign hypertension      Chronic insomnia      Chronic pain syndrome     Chronic back and neck pain, chronic pain due to osteoarthritis multiple joints     Coronary artery disease involving native coronary artery of native heart without angina pectoris 10/16/2018    Minimal coronary artery disease on coronary angiogram in 2015.      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     Major depressive disorder, single episode, severe, without mention of psychotic behavior      Mixed hyperlipidemia      Moderate aortic stenosis 05/2014    moderate to severe aortic valve stenosis     Pelvic relaxation disorder     Surgical intervention for cystocele/rectocele 3,11/2012     Personal history of urinary calculi 6/2006    left ureteral stone,lithotripsy     Psoriasis      PVC (premature ventricular contraction)      Spinal stenosis      Stage III chronic kidney disease (H) 2005     SVT (supraventricular tachycardia) (H)     likely atrial tachycardia     Past Surgical History:   Procedure Laterality Date     APPENDECTOMY  3/2004    incidental     ARTHRODESIS TOE(S) Right 1/31/2020    Procedure: RIGHT FIRST METATARSAL PHALANGEAL JOINT ARTHRODESIS;  Surgeon: Steven Reyes MD;  Location: [facility] OR     C GASTRIC BYPASS,OBESE<100CM ARIANNA-EN-Y  1996     C  MEDIASTINOSCOPY W OR WO BIOPSY  2/2008    Videomediastinoscopy and, for mediastinal adenopathy -reactive lymphoid hyperplasia     C REPAIR OF RECTOCELE  3/2012     C TOTAL KNEE ARTHROPLASTY  12/2005    left      CARPAL TUNNEL RELEASE RT/LT  10/2010    Carpometacarpal excisional arthroplasty with a fascial autograft and APL suspension sling (22552). 2. Left thumb metacarpophalangeal joint fusion with autologous bone graft (20220). 3. Left endoscopic carpal tunnel release      CHOLECYSTECTOMY, LAPOROSCOPIC  11/2010    Cholecystectomy, Laparoscopic     COLONOSCOPY N/A 9/8/2016    Procedure: COMBINED COLONOSCOPY, SINGLE OR MULTIPLE BIOPSY/POLYPECTOMY BY BIOPSY;  Surgeon: Moe Barlow MD;  Location:  GI     CYSTOCELE REPAIR  11/2012    davinc laparoscopic sacrocolpopexy, enterocele repair, lysis of adhesions, placement of retropubic mid urethral sling, cystoscopy     CYSTOSCOPY, LITHOTRIPSY, COMBINED  6/2006    Left extracorporeal shock wave lithotripsy, cystoscopy, left ureteral stent placement.     CYSTOSCOPY, REMOVE STENT(S), COMBINED  7/2006    Cystoscopy, removal of left ureteral stent, retrograde pyelography, flexible and rigid ureteroscopy and holmium laser lithotripsy, basket removal of stone fragments, ureteral stent placement.      ENDOSCOPIC ULTRASOUND UPPER GASTROINTESTINAL TRACT (GI) N/A 6/12/2017    Procedure: ENDOSCOPIC ULTRASOUND, ESOPHAGOSCOPY / UPPER GASTROINTESTINAL TRACT (GI);  ENDOSCOPIC ULTRASOUND, ESOPHAGOSCOPY / UPPER GASTROINTESTINAL TRACT (GI);  Surgeon: Parth Graham MD;  Location:  GI     HERNIA REPAIR  4/2012    bilateral augmentation mastopexy, ventral hernia repair, and medial thigh liposuction on 04/06/2012.      HYSTERECTOMY VAGINAL, BILATERAL SALPINGO-OOPHERECTOMY, COMBINED  1998    due to myoma and bleeding     JOINT REPLACEMENT, HIP RT/LT  4/2004    right total hip arthroplasty     LAPAROTOMY, LYSIS ADHESIONS, COMBINED  3/2004    lysis adhesions, ventral hernia repair,  appendectomy incidentally     LYMPH NODE BIOPSY  4/2008    right axillary, reactive follicular and paracortical hyperplasia.     MAMMOPLASTY AUGMENTATION BILATERAL  4/2012     REPAIR HAMMER TOE Right 1/31/2020    Procedure: WITH SECOND AND THIRD CLAW TOE RECONSTRUCTION;  Surgeon: Steven Reyes MD;  Location: [facility] OR     REVISE RECONSTRUCTED BREAST  6/7/2012    Left breast capsulotomy.      Current Outpatient Medications   Medication Sig Dispense Refill     aspirin 81 MG EC tablet Take 81 mg by mouth daily       aspirin-acetaminophen-caffeine (EXCEDRIN MIGRAINE) 250-250-65 MG tablet Take 2 tablets by mouth daily as needed for headaches       atenolol (TENORMIN) 25 MG tablet Take 1 tablet (25 mg) by mouth every morning 90 tablet 1     baclofen (LIORESAL) 10 MG tablet Take 0.5 tablets (5 mg) by mouth 2 times daily 90 tablet 1     BIOTIN PO Take 1 tablet by mouth every morning       buPROPion (WELLBUTRIN XL) 150 MG 24 hr tablet Take 1 tablet (150 mg) by mouth every morning 90 tablet 1     citalopram (CELEXA) 20 MG tablet Take 1 tablet (20 mg) by mouth daily 90 tablet 1     folic acid (FOLVITE) 1 MG tablet Take 1 mg by mouth daily       furosemide (LASIX) 20 MG tablet TAKE 1 TABLET DAILY 90 tablet 1     gabapentin (NEURONTIN) 300 MG capsule TAKE 1 CAPSULE THREE TIMES A  capsule 0     hydrOXYzine (ATARAX) 25 MG tablet TAKE 1 TABLET BY MOUTH EVERY 6 HOURS AS NEEDED FOR ANXIETY 90 tablet 1     Multiple Vitamins-Minerals (CENTRUM SILVER) per tablet Take 1 tablet by mouth daily       polyethylene glycol (MIRALAX/GLYCOLAX) Packet Take 17 g by mouth daily as needed (constipation) 7 packet      senna-docusate (SENOKOT-S;PERICOLACE) 8.6-50 MG per tablet Take 1-2 tablets by mouth 2 times daily as needed for constipation 100 tablet      traZODone (DESYREL) 100 MG tablet TAKE 1 TABLET NIGHTLY AS NEEDED FOR SLEEP 90 tablet 3     vitamin C (ASCORBIC ACID) 250 MG tablet Take 250 mg by mouth daily         Allergies   Allergen  "Reactions     Bactrim [Sulfamethoxazole W/Trimethoprim] Hives     Codeine Itching     NAUSEA     Morphine Itching     NAUSEA        Social History     Tobacco Use     Smoking status: Never Smoker     Smokeless tobacco: Never Used   Substance Use Topics     Alcohol use: Not Currently     Alcohol/week: 63.0 standard drinks     Types: 63 Standard drinks or equivalent per week     Comment: three \"3oz\" liquor drinks nightly/ sober since October     Family History   Problem Relation Age of Onset     Substance Abuse Father      Cancer Father         throat and lung mets     Diabetes No family hx of      Coronary Artery Disease No family hx of      Cerebrovascular Disease No family hx of      History   Drug Use No            Objective   /72   Pulse 82   Temp 98.7  F (37.1  C) (Tympanic)   Ht 1.626 m (5' 4\")   Wt 74.4 kg (164 lb)   SpO2 98%   BMI 28.15 kg/m    Physical Exam    GENERAL APPEARANCE: healthy, alert and no distress     EYES: EOMI, PERRL     HENT: ear canals and TM's normal and nose and mouth without ulcers or lesions     NECK: no adenopathy, no asymmetry, masses, or scars and thyroid normal to palpation     RESP: lungs clear to auscultation - no rales, rhonchi or wheezes     CV: regular rates and rhythm, 3/6 systolic murmur noted at LSB     ABDOMEN:  soft, nontender, no HSM or masses and bowel sounds normal     MS: extremities normal- no gross deformities noted, no evidence of inflammation in joints, FROM in all extremities.     SKIN: no suspicious lesions or rashes     NEURO: Normal strength and tone, sensory exam grossly normal, mentation intact and speech normal     PSYCH: mentation appears normal. and affect normal/bright     LYMPHATICS: No cervical adenopathy    Recent Labs   Lab Test 08/17/20  1745 03/08/20 02/26/20  1510  02/14/20  0554   HGB 11.7  --   --   --  9.9*     --   --   --  253   *  --  138   < > 134   POTASSIUM 3.8 3.5 3.5   < > 4.1   CR 0.98 1.16* 1.50*   < > 1.68*    " < > = values in this interval not displayed.        PRE-OP Diagnostics:  Recent Results (from the past 168 hour(s))   Comprehensive metabolic panel (BMP + Alb, Alk Phos, ALT, AST, Total. Bili, TP)    Collection Time: 09/25/20  2:26 PM   Result Value Ref Range    Sodium 137 133 - 144 mmol/L    Potassium 3.9 3.4 - 5.3 mmol/L    Chloride 104 94 - 109 mmol/L    Carbon Dioxide 28 20 - 32 mmol/L    Anion Gap 5 3 - 14 mmol/L    Glucose 104 (H) 70 - 99 mg/dL    Urea Nitrogen 11 7 - 30 mg/dL    Creatinine 0.94 0.52 - 1.04 mg/dL    GFR Estimate 59 (L) >60 mL/min/[1.73_m2]    GFR Estimate If Black 68 >60 mL/min/[1.73_m2]    Calcium 9.2 8.5 - 10.1 mg/dL    Bilirubin Total 0.3 0.2 - 1.3 mg/dL    Albumin 3.1 (L) 3.4 - 5.0 g/dL    Protein Total 7.8 6.8 - 8.8 g/dL    Alkaline Phosphatase 82 40 - 150 U/L    ALT 13 0 - 50 U/L    AST 15 0 - 45 U/L   CBC with platelets and differential    Collection Time: 09/25/20  2:26 PM   Result Value Ref Range    WBC 6.8 4.0 - 11.0 10e9/L    RBC Count 3.64 (L) 3.8 - 5.2 10e12/L    Hemoglobin 10.9 (L) 11.7 - 15.7 g/dL    Hematocrit 33.0 (L) 35.0 - 47.0 %    MCV 91 78 - 100 fl    MCH 29.9 26.5 - 33.0 pg    MCHC 33.0 31.5 - 36.5 g/dL    RDW 17.6 (H) 10.0 - 15.0 %    Platelet Count 317 150 - 450 10e9/L    Diff Method Automated Method     % Neutrophils 60.7 %    % Lymphocytes 20.3 %    % Monocytes 13.8 %    % Eosinophils 4.6 %    % Basophils 0.6 %    Absolute Neutrophil 4.1 1.6 - 8.3 10e9/L    Absolute Lymphocytes 1.4 0.8 - 5.3 10e9/L    Absolute Monocytes 0.9 0.0 - 1.3 10e9/L    Absolute Eosinophils 0.3 0.0 - 0.7 10e9/L    Absolute Basophils 0.0 0.0 - 0.2 10e9/L     EKG: appears normal, NSR, no LVH by voltage criteria, nonspecific ST-T changes, unchanged from previous tracings         Assessment & Plan   The proposed surgical procedure is considered INTERMEDIATE risk.    REVISED CARDIAC RISK INDEX  The patient has the following serious cardiovascular risks for perioperative complications:  No serious  cardiac risks = 0 points    INTERPRETATION: 2 points: Class III (moderate risk - 6.6% complication rate)    - Performs 4 METS exercise without symptoms (e.g., light housework, stairs, 4 mph walk, 7 mph bike, slow step dance)    1. Preop general physical exam  - Comprehensive metabolic panel (BMP + Alb, Alk Phos, ALT, AST, Total. Bili, TP)  - CBC with platelets and differential    2. Hallux rigidus, unspecified laterality    3. Severe alcohol dependence (H)    4. Chronic pain syndrome      5. Nonrheumatic aortic valve stenosis    6. Aortic dilatation (H)  - EKG 12-lead complete w/read - Clinics    7. Chronic diastolic congestive heart failure (H)  - EKG 12-lead complete w/read - Clinics    8. CKD (chronic kidney disease) stage 3, GFR 30-59 ml/min (H)    9. Need for prophylactic vaccination and inoculation against influenza  - FLUZONE HIGH DOSE 65+  [89880]    The patient has the following additional risks and recommendations for perioperative complications:      CARDIOVASCULAR:   - Severe aortic stenosis, echo last done 2/2020.  No current symptoms at this time.  Advised that she is due for cardiology follow up but may do this following her procedure.     SOCIAL and SUBSTANCE:    - Alcohol abuse and risk of withdrawal   - Patient is taking medications for chronic pain     MEDICATION INSTRUCTIONS:    CARDIAC Medications:   - BETA BLOCKERS: Continue taking on the day of surgery.    PSYCHIATRIC Medications:   - SSRIs, SNRIs, TCAs, ANTIPSYCHOTICS: Continue without modification.     RECOMMENDATION:  APPROVAL GIVEN to proceed with proposed procedure, without further diagnostic evaluation.    No follow-ups on file.    Signed Electronically by: Ziyad Alejo PA-C    Copy of this evaluation report is provided to requesting physician.    [facility]    Revised Cardiac Risk Index

## 2020-09-26 LAB
ALBUMIN SERPL-MCNC: 3.1 G/DL (ref 3.4–5)
ALP SERPL-CCNC: 82 U/L (ref 40–150)
ALT SERPL W P-5'-P-CCNC: 13 U/L (ref 0–50)
ANION GAP SERPL CALCULATED.3IONS-SCNC: 5 MMOL/L (ref 3–14)
AST SERPL W P-5'-P-CCNC: 15 U/L (ref 0–45)
BILIRUB SERPL-MCNC: 0.3 MG/DL (ref 0.2–1.3)
BUN SERPL-MCNC: 11 MG/DL (ref 7–30)
CALCIUM SERPL-MCNC: 9.2 MG/DL (ref 8.5–10.1)
CHLORIDE SERPL-SCNC: 104 MMOL/L (ref 94–109)
CO2 SERPL-SCNC: 28 MMOL/L (ref 20–32)
CREAT SERPL-MCNC: 0.94 MG/DL (ref 0.52–1.04)
GFR SERPL CREATININE-BSD FRML MDRD: 59 ML/MIN/{1.73_M2}
GLUCOSE SERPL-MCNC: 104 MG/DL (ref 70–99)
POTASSIUM SERPL-SCNC: 3.9 MMOL/L (ref 3.4–5.3)
PROT SERPL-MCNC: 7.8 G/DL (ref 6.8–8.8)
SODIUM SERPL-SCNC: 137 MMOL/L (ref 133–144)

## 2020-09-28 ENCOUNTER — TELEPHONE (OUTPATIENT)
Dept: FAMILY MEDICINE | Facility: CLINIC | Age: 75
End: 2020-09-28

## 2020-09-28 DIAGNOSIS — D64.9 ANEMIA, UNSPECIFIED TYPE: Primary | ICD-10-CM

## 2020-09-28 NOTE — TELEPHONE ENCOUNTER
Please call Linda with the results of her labs.  Her hemoglobin and hematocrit was slightly lower than her last visit.  This is consistent with iron deficiency anemia.  I would like for her to return for labs once she is recovered from her surgery. These future labs have been placed.

## 2020-09-29 ENCOUNTER — VIRTUAL VISIT (OUTPATIENT)
Dept: PALLIATIVE MEDICINE | Facility: CLINIC | Age: 75
End: 2020-09-29
Payer: COMMERCIAL

## 2020-09-29 DIAGNOSIS — F10.20 SEVERE ALCOHOL DEPENDENCE (H): ICD-10-CM

## 2020-09-29 DIAGNOSIS — M54.50 CHRONIC BILATERAL LOW BACK PAIN WITHOUT SCIATICA: Primary | ICD-10-CM

## 2020-09-29 DIAGNOSIS — F32.0 MILD MAJOR DEPRESSION (H): ICD-10-CM

## 2020-09-29 DIAGNOSIS — N18.30 CKD (CHRONIC KIDNEY DISEASE) STAGE 3, GFR 30-59 ML/MIN (H): ICD-10-CM

## 2020-09-29 DIAGNOSIS — G89.29 CHRONIC BILATERAL LOW BACK PAIN WITHOUT SCIATICA: Primary | ICD-10-CM

## 2020-09-29 PROCEDURE — 99204 OFFICE O/P NEW MOD 45 MIN: CPT | Mod: GT | Performed by: PHYSICAL MEDICINE & REHABILITATION

## 2020-09-29 ASSESSMENT — PAIN SCALES - GENERAL: PAINLEVEL: SEVERE PAIN (6)

## 2020-09-29 NOTE — PROGRESS NOTES
"Kavitha Headley is a 74 year old female who is being evaluated via a billable video visit.      The patient has been notified of following:     \"This video visit will be conducted via a call between you and your physician/provider. We have found that certain health care needs can be provided without the need for an in-person physical exam.  This service lets us provide the care you need with a video conversation.  If a prescription is necessary we can send it directly to your pharmacy.  If lab work is needed we can place an order for that and you can then stop by our lab to have the test done at a later time.    Video visits are billed at different rates depending on your insurance coverage.  Please reach out to your insurance provider with any questions.    If during the course of the call the physician/provider feels a video visit is not appropriate, you will not be charged for this service.\"    Patient has given verbal consent for Video visit? Yes  How would you like to obtain your AVS? Mail a copy  If you are dropped from the video visit, the video invite should be resent to: Text to cell phone: .[phone]    Will anyone else be joining your video visit? No      Hyacinth Olvera, [facility] Consultation    Date of visit: 9/29/2020    Reason for consultation:    Kavitha Headley is a 74 year old female who is seen in consultation today at the request of her primary care physician, Nayeli Castorena.     Consultation and Evaluation for: chronic back pain    Review of [place] Prescription Monitoring Program (): Today I have also reviewed the patient's history of controlled substance use, as provided by [place] licensed pharmacies and prescriber dispensers.       Review of Electronic Chart: Today I have also reviewed available medical information in the patient's medical record at [facility] ([facility]), including relevant provider notes, laboratory " "work, and imaging.     Kavitha Headley has not been seen at a pain clinic in the past.      Chief Complaint:    Chief Complaint   Patient presents with     Pain       Pain history:  Kavitha Headley is a 74 year old female who presents for initial evaluation of chief pain complaint of back pain.     Ms. Headley complains of chronic low back pain that radiates to the left buttock and into the back of the thigh. The pain does not radiate below the knee. These symptoms have been present intermittently for more than 50 years. The pain became constant about 2-3 years ago. She has had epidural injections for this in the past with significant pain relief but with variable duration (typically 2-3 months, last injection only 1 month of relief). She reports she has \"disintegrating discs\" in her low back. She does not recall having a lumbar MRI within the past 5 years and feels it may have been more than 10 years since her last lumbar MRI. She has been seen by surgery at [facility], no surgery was recommended, just conservative care.    She also reports that she had a surgery on her right great toe after fractures in the past. She has a had a fusion in the past and is having revision and removal of some of the hardware tomorrow.    She reports drinking several drinks daily and occasionally with her medications as well. 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 After this she started abusing alcohol and has had difficulty abstaining.    Onset: 2018  Location/Radiation: low back, left hip  Quality: Spasm  Severity/Intensity:  5/10 on average  Aggravating factors include: movement, activity, prolonged walking/standing  Relieving factors include: rest, sitting  Red Flags: The patient denies bowel or bladder incontinence, parasthesias, weakness, saddle anesthesia, unintentional weight loss, or fever/chills/sweats.         Pain Treatments:  1. Medications:       Current pain " medications:  -Gabapentin 300mg TID - helps a little  -Baclofen 5mg TID prn - helps along with gabapentin  -Ibuprofen 800mg prn       Previous pain medications:  -Percocet  2. Physical Therapy: helpful in the past    TENS unit: helpful, doesn't help when pain is severe  3. Pain psychology: hasn't tried  4. Surgery: no back surgeries  5. Injections: left L4 epidurals at [facility] with 2-3 months of relief  6. Alternative Therapies:    Chiropractic: did this in the past, didn't help with back pain   Acupuncture: hasn't tried      Labs:   Lab Results   Component Value Date    WBC 6.8 09/25/2020     Lab Results   Component Value Date    RBC 3.64 09/25/2020     Lab Results   Component Value Date    HGB 10.9 09/25/2020     Lab Results   Component Value Date    HCT 33.0 09/25/2020     No components found for: MCT  Lab Results   Component Value Date    MCV 91 09/25/2020     Lab Results   Component Value Date    MCH 29.9 09/25/2020     Lab Results   Component Value Date    MCHC 33.0 09/25/2020     Lab Results   Component Value Date    RDW 17.6 09/25/2020     Lab Results   Component Value Date     09/25/2020     Last Comprehensive Metabolic Panel:  Sodium   Date Value Ref Range Status   09/25/2020 137 133 - 144 mmol/L Final     Potassium   Date Value Ref Range Status   09/25/2020 3.9 3.4 - 5.3 mmol/L Final     Chloride   Date Value Ref Range Status   09/25/2020 104 94 - 109 mmol/L Final     Carbon Dioxide   Date Value Ref Range Status   09/25/2020 28 20 - 32 mmol/L Final     Anion Gap   Date Value Ref Range Status   09/25/2020 5 3 - 14 mmol/L Final     Glucose   Date Value Ref Range Status   09/25/2020 104 (H) 70 - 99 mg/dL Final     Comment:     Fasting specimen     Urea Nitrogen   Date Value Ref Range Status   09/25/2020 11 7 - 30 mg/dL Final     Creatinine   Date Value Ref Range Status   09/25/2020 0.94 0.52 - 1.04 mg/dL Final     GFR Estimate   Date Value Ref Range Status   09/25/2020 59 (L) >60 mL/min/[1.73_m2] Final      Comment:     Non  GFR Calc  Starting 12/18/2018, serum creatinine based estimated GFR (eGFR) will be   calculated using the Chronic Kidney Disease Epidemiology Collaboration   (CKD-EPI) equation.       Calcium   Date Value Ref Range Status   09/25/2020 9.2 8.5 - 10.1 mg/dL Final         Past Medical History:  Past Medical History:   Diagnosis Date     Alcohol abuse     Long term alcohol abuse. Abstinenet since October 2019.     Anxiety disorder      Ascending aorta dilatation (H)      Bariatric surgery status 1996?    gastric bypass, [facility] and     Benign hypertension      Chronic insomnia      Chronic pain syndrome     Chronic back and neck pain, chronic pain due to osteoarthritis multiple joints     Coronary artery disease involving native coronary artery of native heart without angina pectoris 10/16/2018    Minimal coronary artery disease on coronary angiogram in 2015.      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     Major depressive disorder, single episode, severe, without mention of psychotic behavior      Mixed hyperlipidemia      Moderate aortic stenosis 05/2014    moderate to severe aortic valve stenosis     Pelvic relaxation disorder     Surgical intervention for cystocele/rectocele 3,11/2012     Personal history of urinary calculi 6/2006    left ureteral stone,lithotripsy     Psoriasis      PVC (premature ventricular contraction)      Spinal stenosis      Stage III chronic kidney disease (H) 2005     SVT (supraventricular tachycardia) (H)     likely atrial tachycardia       Past Surgical History:  Past Surgical History:   Procedure Laterality Date     APPENDECTOMY  3/2004    incidental     ARTHRODESIS TOE(S) Right 1/31/2020     Procedure: RIGHT FIRST METATARSAL PHALANGEAL JOINT ARTHRODESIS;  Surgeon: Steven Reyes MD;  Location:  OR     C GASTRIC BYPASS,OBESE<100CM ARIANNA-EN-Y  1996     C MEDIASTINOSCOPY W OR WO BIOPSY  2/2008    Videomediastinoscopy and, for mediastinal adenopathy -reactive lymphoid hyperplasia     C REPAIR OF RECTOCELE  3/2012     C TOTAL KNEE ARTHROPLASTY  12/2005    left      CARPAL TUNNEL RELEASE RT/LT  10/2010    Carpometacarpal excisional arthroplasty with a fascial autograft and APL suspension sling (49456). 2. Left thumb metacarpophalangeal joint fusion with autologous bone graft (13155). 3. Left endoscopic carpal tunnel release      CHOLECYSTECTOMY, LAPOROSCOPIC  11/2010    Cholecystectomy, Laparoscopic     COLONOSCOPY N/A 9/8/2016    Procedure: COMBINED COLONOSCOPY, SINGLE OR MULTIPLE BIOPSY/POLYPECTOMY BY BIOPSY;  Surgeon: oMe Barlow MD;  Location:  GI     CYSTOCELE REPAIR  11/2012    davinci laparoscopic sacrocolpopexy, enterocele repair, lysis of adhesions, placement of retropubic mid urethral sling, cystoscopy     CYSTOSCOPY, LITHOTRIPSY, COMBINED  6/2006    Left extracorporeal shock wave lithotripsy, cystoscopy, left ureteral stent placement.     CYSTOSCOPY, REMOVE STENT(S), COMBINED  7/2006    Cystoscopy, removal of left ureteral stent, retrograde pyelography, flexible and rigid ureteroscopy and holmium laser lithotripsy, basket removal of stone fragments, ureteral stent placement.      ENDOSCOPIC ULTRASOUND UPPER GASTROINTESTINAL TRACT (GI) N/A 6/12/2017    Procedure: ENDOSCOPIC ULTRASOUND, ESOPHAGOSCOPY / UPPER GASTROINTESTINAL TRACT (GI);  ENDOSCOPIC ULTRASOUND, ESOPHAGOSCOPY / UPPER GASTROINTESTINAL TRACT (GI);  Surgeon: Parth Graham MD;  Location:  GI     HERNIA REPAIR  4/2012    bilateral augmentation mastopexy, ventral hernia repair, and medial thigh liposuction on 04/06/2012.      HYSTERECTOMY VAGINAL, BILATERAL SALPINGO-OOPHERECTOMY, COMBINED  1998    due to myoma and  bleeding     JOINT REPLACEMENT, HIP RT/LT  4/2004    right total hip arthroplasty     LAPAROTOMY, LYSIS ADHESIONS, COMBINED  3/2004    lysis adhesions, ventral hernia repair, appendectomy incidentally     LYMPH NODE BIOPSY  4/2008    right axillary, reactive follicular and paracortical hyperplasia.     MAMMOPLASTY AUGMENTATION BILATERAL  4/2012     REPAIR HAMMER TOE Right 1/31/2020    Procedure: WITH SECOND AND THIRD CLAW TOE RECONSTRUCTION;  Surgeon: Steven Reyes MD;  Location: [facility] OR     REVISE RECONSTRUCTED BREAST  6/7/2012    Left breast capsulotomy.        Medications:  Current Outpatient Medications   Medication Sig Dispense Refill     aspirin 81 MG EC tablet Take 81 mg by mouth daily       aspirin-acetaminophen-caffeine (EXCEDRIN MIGRAINE) 250-250-65 MG tablet Take 2 tablets by mouth daily as needed for headaches       atenolol (TENORMIN) 25 MG tablet Take 1 tablet (25 mg) by mouth every morning 90 tablet 1     baclofen (LIORESAL) 10 MG tablet Take 0.5 tablets (5 mg) by mouth 2 times daily 90 tablet 1     BIOTIN PO Take 1 tablet by mouth every morning       buPROPion (WELLBUTRIN XL) 150 MG 24 hr tablet Take 1 tablet (150 mg) by mouth every morning 90 tablet 1     citalopram (CELEXA) 20 MG tablet Take 1 tablet (20 mg) by mouth daily 90 tablet 1     folic acid (FOLVITE) 1 MG tablet Take 1 mg by mouth daily       furosemide (LASIX) 20 MG tablet TAKE 1 TABLET DAILY 90 tablet 1     gabapentin (NEURONTIN) 300 MG capsule TAKE 1 CAPSULE THREE TIMES A  capsule 0     hydrOXYzine (ATARAX) 25 MG tablet TAKE 1 TABLET BY MOUTH EVERY 6 HOURS AS NEEDED FOR ANXIETY 90 tablet 1     Multiple Vitamins-Minerals (CENTRUM SILVER) per tablet Take 1 tablet by mouth daily       polyethylene glycol (MIRALAX/GLYCOLAX) Packet Take 17 g by mouth daily as needed (constipation) 7 packet      senna-docusate (SENOKOT-S;PERICOLACE) 8.6-50 MG per tablet Take 1-2 tablets by mouth 2 times daily as needed for constipation 100 tablet       traZODone (DESYREL) 100 MG tablet TAKE 1 TABLET NIGHTLY AS NEEDED FOR SLEEP 90 tablet 3     vitamin C (ASCORBIC ACID) 250 MG tablet Take 250 mg by mouth daily         Allergies:     Allergies   Allergen Reactions     Bactrim [Sulfamethoxazole W/Trimethoprim] Hives     Codeine Itching     NAUSEA     Morphine Itching     NAUSEA       Social History:  Home situation: Lives in [place]  Occupation/Schooling: Nursing, retired  Tobacco use: Denies  Drug use: Denies  Alcohol use: Daily drinking  History of chemical dependency treatment: has completed alcohol dependency treatment in the past  Mental health admissions: denies    Family history:  Family History   Problem Relation Age of Onset     Substance Abuse Father      Cancer Father         throat and lung mets     Diabetes No family hx of      Coronary Artery Disease No family hx of      Cerebrovascular Disease No family hx of        Review of Systems:    POSTIVE IN BOLD  GENERAL: fever/chills, fatigue, general unwell feeling, weight gain/loss.  HEAD/EYES:  headache, dizziness, or vision changes.    EARS/NOSE/THROAT:  Nosebleeds, hearing loss, sinus infection, earache, tinnitus.  IMMUNE:  Allergies, cancer, immune deficiency, or infections.  SKIN:  Urticaria, rash, hives  HEME/Lymphatic:   anemia, easy bruising, easy bleeding.  RESPIRATORY:  cough, wheezing, or shortness of breath  CARDIOVASCULAR/Circulation:  Extremity edema, syncope, hypertension, tachycardia, or angina.  GASTROINTESTINAL:  abdominal pain, nausea/emesis, diarrhea, constipation,  hematochezia, or melena.  ENDOCRINE:  Diabetes, steroid use,  thyroid disease or osteoporosis.  MUSCULOSKELETAL: neck pain, back pain, arthralgia, arthritis, or gout.  GENITOURINARY:  frequency, urgency, dysuria, difficulty voiding, hematuria or incontinence.  NEUROLOGIC:  weakness, numbness, paresthesias, seizure, tremor, stroke or memory loss.  PSYCHIATRIC:  depression, anxiety, stress, suicidal thoughts or mood swings.  "          Assessment:  Ms. Headley is a 74 year old with past medical history including: HLD, HTN, GERD, CAD, Paroxysmal SVT, CKD Stage 3, Anxiety, Depression, PTSD, Alcohol abuse who presents for evaluation and treatment of chronic low back pain:    1. Chronic low back pain: Patient describes pain across the belt line and into the left hip and buttock. She denies any red flag signs or symptoms today. Symptoms have been ongoing for 50+ years intermittently but constant and severe over the past 2 years. 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     She reports having \"disintegrating discs\" in her low back and that epidural injections have been beneficial in the past although the last procedure only helped for 1 month. She has not had any advanced imaging int he past 5 years and reports that she was seen by a surgeon who recommended conservative care.     Discussed openly with the patient that given her current alcohol abuse and significant alcohol intake, I would not feel comfortable prescribing any medications for her due to the interactions with alcohol. Currently she takes gabapentin, baclofen and ibuprofen all of which can lead to severe side effects when used in conjunction with alchol, made this abundantly clear to the patient. Specifically she uses ibuprofen 800mg at night and takes this with demetrice which increses her risk for side effects including gastric ulcers. After the patient has completed alcohol dependency treatment and is abstaining from alcohol use, I could discuss medication management at that time. At this time we'll obtain a lumbar MRI and discuss various interventional options for her pain symptoms.    Mental Health - the patient's mental health concerns, specifically anxiety, depression, PTSD, alcohol abuse, affect her experience of pain and contribute to her clinically significant distress.        Plan:  The following " recommendations were given to the patient. Diagnosis, treatment options, risks, benefits, and alternatives were discussed, and all questions were answered. The patient expressed understanding of the plan for management.     I am recommending a multidisciplinary treatment plan to help this patient better manage her pain.  This includes:     1. Physical Therapy: Will discuss once MRI is available.  2. Clinical Health Pain Psychologist: Recommend she complete chem dep treatment first.   3. Self Care Recommendations: Gentle progressive exercise that does not increase pain - gradually increase daily walking program.  Take mini breaks - 5 minutes of mindfullness a couple times a day.   4. Diagnostic Studies: Lumbar MRI ordered  5. Medication Management:   1. No changes  2. Avoid alcohol with all prescription medications.   3. Avoid using NSAIDs daily due to CKD.  6. Further procedures recommended: Consider lumbar mbb/RFA vs facet injections.  7. Follow up: Based on MRI results.           Ketan Garcia DO  [facility]       Video-Visit Details    Type of service:  Video Visit    Video Start Time: 3:00AM  Video End Time: 3:27 PM    Originating Location (pt. Location): Home    Distant Location (provider location):  [facility]     Platform used for Video Visit: [employer]

## 2020-09-29 NOTE — TELEPHONE ENCOUNTER
S/w pt and gave Ziyad's message below.    Pt states understanding.    Ashly CANNON RN  EP Triage

## 2020-09-29 NOTE — PATIENT INSTRUCTIONS
1. I ordered an MRI, you'll be called to schedule.    2. Do not drink alcohol when taking any of the medications you have been prescribed.    3. Do not take ibuprofen daily, your kidney function has been low in the past and this could cause it to worsen.    4. Donot use tylenol if you are drinking alcohol daily.    5. Will discuss next steps (Physical therapy, injections etc) after your MRI result is available.    Take care,    DO [facility]        ----------------------------------------------------------------  Clinic Number:  [phone]     Call with any questions about your care and for scheduling assistance.     Calls are returned Monday through Friday between 8 AM and 4:30 PM. We usually get back to you within 2 business days depending on the issue/request.    If we are prescribing your medications:    For opioid medication refills, call the clinic or send a Generate message 7 days in advance.  Please include:    Name of requested medication    Name of the pharmacy.    For non-opioid medications, call your pharmacy directly to request a refill. Please allow 3-4 days to be processed.     Per [place] State Law:    All controlled substance prescriptions must be filled within 30 days of being written.      For those controlled substances allowing refills, pickup must occur within 30 days of last fill.      We believe regular attendance is key to your success in our program!      Any time you are unable to keep your appointment we ask that you call us at least 24 hours in advance to cancel.This will allow us to offer the appointment time to another patient.     Multiple missed appointments may lead to dismissal from the clinic.

## 2020-10-05 ENCOUNTER — TELEPHONE (OUTPATIENT)
Dept: FAMILY MEDICINE | Facility: CLINIC | Age: 75
End: 2020-10-05

## 2020-10-05 NOTE — TELEPHONE ENCOUNTER
General Call:     Who is calling:  PT    Reason for Call:  PT had an alcohol assessment and would like to get a copy of it and also find date .      What are your questions or concerns:      Date of last appointment with provider:     Okay to leave a detailed message:Yes at Cell number on file:    Telephone Information:   Mobile [phone]

## 2020-10-06 NOTE — TELEPHONE ENCOUNTER
I printed the two copies of the report (I believe one may be a longer version of the first).  Please send the most up to date one to pt.      Nayeli Castorena MBA, MS, PA-C

## 2020-10-06 NOTE — TELEPHONE ENCOUNTER
Called and spoke with patient, she requested we mail these records to her.     I have placed them in the out going mail.    Rachel Gomez MA

## 2020-10-06 NOTE — TELEPHONE ENCOUNTER
Routing to provider, is there a specific assessment that is done at the ER that we should give?      Rachel Gomez MA

## 2020-10-07 DIAGNOSIS — F32.0 MILD MAJOR DEPRESSION (H): ICD-10-CM

## 2020-10-07 DIAGNOSIS — G89.4 CHRONIC PAIN SYNDROME: ICD-10-CM

## 2020-10-07 DIAGNOSIS — F32.A ANXIETY AND DEPRESSION: ICD-10-CM

## 2020-10-07 DIAGNOSIS — F41.9 ANXIETY AND DEPRESSION: ICD-10-CM

## 2020-10-08 RX ORDER — ATENOLOL 25 MG/1
25 TABLET ORAL EVERY MORNING
Qty: 90 TABLET | Refills: 3 | Status: SHIPPED | OUTPATIENT
Start: 2020-10-08 | End: 2021-02-11

## 2020-10-08 NOTE — TELEPHONE ENCOUNTER
Metoprolol - Prescription approved per [facility] Refill Protocol.    Baclofen        Last Written Prescription Date:  6/12/20  Last Fill Quantity: 90,   # refills: 1  Last Office Visit: 9/25/20 with Ziyad Alejo PA-C  Future Office visit:       Routing refill request to provider for review/approval because:  Drug not on the [facility], [facility] or [facility] refill protocol or controlled substance  Mya Llye RN

## 2020-10-09 ENCOUNTER — HOSPITAL ENCOUNTER (OUTPATIENT)
Dept: MRI IMAGING | Facility: CLINIC | Age: 75
Discharge: HOME | End: 2020-10-09
Attending: PHYSICAL MEDICINE & REHABILITATION | Admitting: PHYSICAL MEDICINE & REHABILITATION
Payer: COMMERCIAL

## 2020-10-09 DIAGNOSIS — G89.29 CHRONIC BILATERAL LOW BACK PAIN WITHOUT SCIATICA: ICD-10-CM

## 2020-10-09 DIAGNOSIS — M54.50 CHRONIC BILATERAL LOW BACK PAIN WITHOUT SCIATICA: ICD-10-CM

## 2020-10-09 PROCEDURE — 72148 MRI LUMBAR SPINE W/O DYE: CPT

## 2020-10-09 RX ORDER — BACLOFEN 10 MG/1
TABLET ORAL
Qty: 60 TABLET | Refills: 0 | Status: SHIPPED | OUTPATIENT
Start: 2020-10-09 | End: 2020-11-27

## 2020-10-09 NOTE — TELEPHONE ENCOUNTER
Routing to [facility] to assist with scheduling      Sara Williamson RN  [facility]

## 2020-10-09 NOTE — TELEPHONE ENCOUNTER
Pt no showed last appt with me.  One month sent to pharmacy.  Advise pt needs follow up appt (40 minute appt).  Need to recheck labs - pt should come fasting      Nayeli Castorena MBA, MS, PA-C

## 2020-10-12 ENCOUNTER — TELEPHONE (OUTPATIENT)
Dept: PALLIATIVE MEDICINE | Facility: CLINIC | Age: 75
End: 2020-10-12

## 2020-10-12 DIAGNOSIS — M51.369 DDD (DEGENERATIVE DISC DISEASE), LUMBAR: Primary | ICD-10-CM

## 2020-10-12 NOTE — TELEPHONE ENCOUNTER
Had pre op on 9/25 which she thought would cover everything.     Explained a physical or annual wellness is different.     She will call back to schedule      Essence Elizondo

## 2020-10-12 NOTE — TELEPHONE ENCOUNTER
Called patient to discuss MRI results and left VM to call back. MRI shows widespread mild-moderate disc bulges and mild-moderate facet arthritis. Recommend trying a lumbar epidural steroid injection initially as she reported this had been helpful in the past. If patient is agreeable, I can place the order.    Ketan Garcia, DO  [facility]

## 2020-10-14 ENCOUNTER — TELEPHONE (OUTPATIENT)
Dept: FAMILY MEDICINE | Facility: CLINIC | Age: 75
End: 2020-10-14

## 2020-10-14 NOTE — TELEPHONE ENCOUNTER
Order/Referral Request:    Who is requesting: Linda    Orders being requested: Alcohol assessment paperwork - Wanting it sent to [facility] (Fax [phone]    Reason service is needed/diagnosis: Admit    When are orders needed by: ASAP    Has this been discussed with Provider: Yes    Does patient have a preference on a Group/Provider/Facility?     Does patient have an appointment scheduled: No    Where to send Orders: Fax    Okay to leave detailed message?  Yes at Home number on file [phone] (home)    Routing

## 2020-10-14 NOTE — TELEPHONE ENCOUNTER
Routing to PCP as these have been printed recently, writer unable to find alcohol assessments.    Pankaj MYERS RN   [facility]

## 2020-10-14 NOTE — TELEPHONE ENCOUNTER
Called pt and LM re: Dr. Garcia is recommending an LESI after review of the MRI results. LM that she may call the main clinic number to get more information about her results and to let us know if she is interested in having an injection.     Leslie Milton, EMILIAN, RN-BC  Patient Care Supervisor  [facility] [place]

## 2020-10-14 NOTE — TELEPHONE ENCOUNTER
Unsure what paperwork she needs, not sure if ER visit from 8/17 or if there is another assessment     Please advise.       Essence Elizondo

## 2020-10-17 NOTE — TELEPHONE ENCOUNTER
This was done already? according to Nayeli Castorena's note below? Please inquire and let me know if anything else needs done.       Maria Dolores Mccormack, FNP-BC      I printed the two copies of the report (I believe one may be a longer version of the first).  Please send the most up to date one to pt.       Nayeli Castorena MBA, MS, PA-C

## 2020-10-20 NOTE — TELEPHONE ENCOUNTER
Called patient. Advised per below. She has scheduled a virtual appt to discuss shoulder pain. She inquired about LESI scheduling. Advised that was ordered and will route to  for prescreening and scheduling. On chart review no immediate concerns-(no anticoag hold ect needed) we did discuss ASA and staying under 325mg daily x1 week- takes 81mg ASA and PRN Excedrin    **routing to  to contact for LESI scheduling    Tiara ERICKSON, RN Care Coordinator  [facility]

## 2020-10-20 NOTE — TELEPHONE ENCOUNTER
The lumbar epidural was ordered, this would be for her low back pain.     I haven't formally evaluated this other pain complaint before, she can set up a virtual visit to discuss this. Pain between the shoulder blades is typically myofascial and physical therapy would be the place to start, not injections. I will only be able to briefly evaluate this when she comes for her injection so I would recommend a virtual visit instead.    Ketan Garcia,   [facility]

## 2020-10-20 NOTE — TELEPHONE ENCOUNTER
Called Linda.  She would like to proceed with the epidural injection.  She lives in [place]. Wondering if she could have this procedure done closer to her home.. Advised we do the injections here and [place].  She will come here. She will wait for our scheduling department to call her.    Routing to provider for LESI orders.    Haydee Luke RN  Care Coordinator  [facility]

## 2020-10-20 NOTE — TELEPHONE ENCOUNTER
Voicemail was left for patient to schedule Left L4-5 interlaminar epidural         Candy Burns    [facility]

## 2020-10-20 NOTE — TELEPHONE ENCOUNTER
Linda calling back.  She is also wondering if she could have an injection to her upper back between her shoulder blades.  Having a lot of pain, difficult time doing the dishes.  She is not sure if she has discussed this in the past.      Routing to provider to review.  Schedule virtual visit vs discussing symptoms at injection for ANDERSON Luke RN  Care Coordinator  [facility]

## 2020-10-21 ENCOUNTER — VIRTUAL VISIT (OUTPATIENT)
Dept: PSYCHIATRY | Facility: CLINIC | Age: 75
End: 2020-10-21
Attending: OPTOMETRIST
Payer: COMMERCIAL

## 2020-10-21 DIAGNOSIS — F34.1 DYSTHYMIA: ICD-10-CM

## 2020-10-21 DIAGNOSIS — F10.20 ALCOHOL USE DISORDER, SEVERE, DEPENDENCE (H): Primary | ICD-10-CM

## 2020-10-21 PROCEDURE — 90792 PSYCH DIAG EVAL W/MED SRVCS: CPT | Mod: GC

## 2020-10-21 RX ORDER — MIRTAZAPINE 15 MG/1
15 TABLET, FILM COATED ORAL AT BEDTIME
Qty: 30 TABLET | Refills: 0 | Status: SHIPPED | OUTPATIENT
Start: 2020-10-21 | End: 2020-11-11

## 2020-10-21 RX ORDER — NALTREXONE HYDROCHLORIDE 50 MG/1
50 TABLET, FILM COATED ORAL DAILY
Qty: 30 TABLET | Refills: 0 | Status: SHIPPED | OUTPATIENT
Start: 2020-10-21 | End: 2020-11-11

## 2020-10-21 ASSESSMENT — PAIN SCALES - GENERAL: PAINLEVEL: NO PAIN (0)

## 2020-10-21 NOTE — PROGRESS NOTES
"  ----------------------------------------------------------------------------------------------------------  [facility] New Patient Evaluation  Addiction Medicine                        TELEPHONE VISIT  Kavitha Headley is a 75 year old pt. who is being evaluated via a billable telephone visit.      The patient has been notified of the following:    We have found that certain health care needs can be provided without the need for a physical exam. This service lets us provide the care you need with a short phone conversation. If a prescription is necessary we can send it directly to your pharmacy. If lab work is needed we can place an order for that and you can then stop by our lab to have the test done at a later time. Insurers are generally covering virtual visits as they would in-office visits so billing should not be different than normal.  If for some reason you do get billed incorrectly, you should contact the billing office to correct it and that number is in the AVS .    Patient has given verbal consent for a telephone visit?:  Yes   How would the pt like to obtain the AVS?:  Patient declined  AVS SmartPhrase [PsychAVS] has been placed in 'Patient Instructions':  N/A     Start Time:  9:48 AM          End Time:  10:23          IDENTIFICATION   Kavitha Headley is a 75 year old female who was referred by self for evaluation of alcohol use disorder.  History was provided by patient who was a good historian.       CHIEF COMPLAINT         \" Alcohol Use Disorder \"      HISTORY OF PRESENT ILLNESS     Background: (Detailed psychiatric and substance history below)  History of alcohol use disorder.  Didn't drink much until her 50s.  Amount consumes has varied since that time.  Additional history of depression.  Outpatient treatment on one occasion several years ago.        MOST RECENT HISTORY:  Currently trying to get inpatient treatment at [facility] which has been " "frustrating during COVID for her.   Currently drinking demetrice and feels she has cut down.  Now 2 \"hefty\" demetrice drinks per night, down from 8 previously.  Estimates 4 oz per night.  Was having withdrawal symptoms approximately 6 weeks ago while she was weaning.  Was in hospital for this withdrawal.    Rule 25 performed in July with update being performed tomorrow virtually through [email or web address].  Was told approximately 3-4 weeks before treatment would be available.    Sleeping has always been an issue and has been on trazodone 25 mg, baclofen and gabapentin 600 mg to help with this.  No residual hangover.    Remron in the past has helped.    Has never been on pharmacotherapy for AUD.  Not currently involved in any form of recovery.  In the past has been involved with AA and liked the group.  Has seen psychology in the past and would like to restart.      RECENT SYMPTOMS   [PSYCH ROS]   CRAVINGS/URGES: moderate while drinking  SLEEP: poor sleeping, uses demetrice to help sleep  ANXIETY:  denies  PANIC ATTACK:  none   DEPRESSION:  depressed mood and low energy;  DENIES- suicidal ideation  PSYCHOSIS:  denies;    GREGORY/HYPOMANIA:  denies;    TRAUMA RELATED:  none  COMPULSIVE:  denies  OTHER:  denies      SUBSTANCE USE HISTORY                                                                 Substance Pattern/Details of use Most recent use   Alcohol Hx of heavy use starting in 50s.  Regular use since.  Outpatient treatment [address] 2010 Current daily   Cannabis     Cocaine     Stimulants     Opioids     Sedatives     Hallucinogens     Inhalants     Other     OTC drugs     Nicotine       Treatment History, Abstinent Periods, Substance use Pharmacotherapies:  Outpatient treatment at [facility] approximately 2010  10 days of treatment 2 years ago at [place].  Left early.     Consequences of Use:  Legal: denies  Social/Family: cancelling plans and family conflict because of  Occupational/Financial: denies  Health: denies      Use " Disorder Criteria: (Bold are positive) 8/11 criteria met (mild (2-3)/moderate (4-5)/severe (6+) level)  ?1. Often taken in larger amounts or over a longer period than was intended.  ?2. A persistent desire or unsuccessful efforts to cut down or control use.  ?3. A great deal of time is spent in activities necessary to obtain, use, or recover from the substance s effects.  ?4. Craving or a strong desire or urge to use the substance.  ?5. Recurrent use resulting in a failure to fulfill major role obligations at work, school, or home.  ?6. Continued use despite having persistent or recurrent social or interpersonal problems caused or exacerbated by its effects.  ?7. Important social, occupational, or recreational activities are given up or reduced because of use.  ?8. Recurrent use in situations in which it is physically hazardous.  ?9. Continued use despite knowledge of having a persistent or recurrent physical or psychological problem that is likely to have been caused or exacerbated by the substance.  ?10. Tolerance.  ?11. Withdrawal.       PSYCHIATRIC HISTORY     Previous diagnoses, history and diagnostic clarification:  Depression       Symptoms do not precede substance use    Trauma History (self-report)- None  Past court commitments: none  SIB [method, most recent]- none  Violence/Aggression Hx- none  Eating Disorder: denies    Suicide Attempt Hx:   #- N/A       Psych Hosp- none  Outpatient Programs: none  Therapist: Denies currently.  Would like to start.    Current/prior Psychiatrist: none          PAST PSYCH MED TRIALS       Drug  Max Dose (mg) Helpful Adverse Effects   Reason Discontinued Treatment dates    Celexa 20 unsure  Didn't notice change when forgot to take Through September 2020   Wellbutrin XL  150 unsure  Didn't notice change when forgot to take Through September 2020   Mirtazapine ? yes none  Through approximately 2005; restarted 10/2020   Seroquel ? yes none  Through approximately 2005                          SOCIAL HISTORY                                                                         Financial Support- sliding scale insulin and pension  Living Situation/Family/Relationships- Living in [place] with significant other    Early History/Education- Nursing degree    FAMILY HISTORY                                                                       patient reported     Family Mental Health History-  unknown,  Substance Use Problems - Dad EtOH   Medical- Dad Cancer     MEDICAL / SURGICAL HISTORY                                   CARE TEAM:          PCP- Nayeli Castorena                     Patient Active Problem List   Diagnosis     Spinal stenosis     Chronic insomnia     CKD (chronic kidney disease) stage 3, GFR 30-59 ml/min     Hip joint replacement status     Knee joint replacement status     Chronic pain syndrome     Bariatric surgery status     Mixed hyperlipidemia     Personal history of urinary calculi     Macrocytic anemia     Anxiety     Aortic stenosis     Left-sided low back pain without sciatica     PAC (premature atrial contraction)     Controlled substance agreement signed     Seasonal affective disorder (H)     HTN, goal below 140/90     Hyponatremia     Vitamin B12 deficiency (non anemic)     Severe alcohol dependence (H)     Liver disease, chronic, due to alcohol (H)     Paroxysmal supraventricular tachycardia (H)     Coronary artery disease involving native coronary artery of native heart without angina pectoris     Mild ascending aorta dilatation (H)     Psoriasis - on Humira - Dr. Gauri Clark with dermatology     Aortic dilatation (H)     Mild major depression (H)     Thiamine deficiency     Herpes simplex virus infection     Hallux rigidus of right foot     CHF (congestive heart failure) (H)     Gastroesophageal reflux disease with esophagitis - chronic due to alcohol       MEDICAL ROS                              ROS: 10 point ROS neg other than the symptoms noted above in  the HPI.     ALLERGY                                Bactrim [sulfamethoxazole w/trimethoprim], Codeine, and Morphine  MEDICATIONS                               Current Outpatient Medications   Medication Sig Dispense Refill     aspirin 81 MG EC tablet Take 81 mg by mouth daily       aspirin-acetaminophen-caffeine (EXCEDRIN MIGRAINE) 250-250-65 MG tablet Take 2 tablets by mouth daily as needed for headaches       atenolol (TENORMIN) 25 MG tablet TAKE 1 TABLET (25 MG) BY MOUTH EVERY MORNING 90 tablet 3     baclofen (LIORESAL) 10 MG tablet TAKE 1 TABLET BY MOUTH TWICE A DAY 60 tablet 0     BIOTIN PO Take 1 tablet by mouth every morning       buPROPion (WELLBUTRIN XL) 150 MG 24 hr tablet Take 1 tablet (150 mg) by mouth every morning 90 tablet 1     citalopram (CELEXA) 20 MG tablet Take 1 tablet (20 mg) by mouth daily 90 tablet 1     folic acid (FOLVITE) 1 MG tablet Take 1 mg by mouth daily       furosemide (LASIX) 20 MG tablet TAKE 1 TABLET DAILY 90 tablet 1     gabapentin (NEURONTIN) 300 MG capsule TAKE 1 CAPSULE THREE TIMES A  capsule 0     hydrOXYzine (ATARAX) 25 MG tablet TAKE 1 TABLET BY MOUTH EVERY 6 HOURS AS NEEDED FOR ANXIETY 90 tablet 1     Multiple Vitamins-Minerals (CENTRUM SILVER) per tablet Take 1 tablet by mouth daily       polyethylene glycol (MIRALAX/GLYCOLAX) Packet Take 17 g by mouth daily as needed (constipation) 7 packet      senna-docusate (SENOKOT-S;PERICOLACE) 8.6-50 MG per tablet Take 1-2 tablets by mouth 2 times daily as needed for constipation 100 tablet      traZODone (DESYREL) 100 MG tablet TAKE 1 TABLET NIGHTLY AS NEEDED FOR SLEEP 90 tablet 3     vitamin C (ASCORBIC ACID) 250 MG tablet Take 250 mg by mouth daily         VITALS   There were no vitals taken for this visit.     MENTAL STATUS EXAM/WITHDRAWAL                                                              Withdrawal Symptoms: denies    Alertness: alert  and oriented  Orientation: awake and alert  Appearance: Unable to assess  due to telephone visit  Behavior/Demeanor: cooperative, pleasant and calm, with good  eye contact.  Speech: normal  Psychomotor: Unable to assess due to telephone visit    Mood:  euthymic  Affect: full range and was congruent to speech content.  Thought Process/Associations: unremarkable   Thought Content: devoid of  suicidal ideation, violent ideation and delusions.   Perception: devoid of  auditory hallucinations and visual hallucinations  Insight: good.  Judgment: good.    These cognitive functions grossly appear as described, but were not formally tested.      LABS and DATA     Recent Labs   Lab Test 09/25/20 1426 08/17/20 1745 03/08/20   CR 0.94 0.98 1.16*   GFRESTIMATED 59* 56* 46*     Recent Labs   Lab Test 09/25/20  1426 08/17/20  1745 03/08/20 01/28/20  1408 01/28/20  1408   AST 15 29 19   < > 21   ALT 13 19 15   < > 19   ALKPHOS 82 92  --   --  44    < > = values in this interval not displayed.       PHQ 12/4/2019 6/12/2020 8/24/2020   PHQ-9 Total Score 4 4 14   Q9: Thoughts of better off dead/self-harm past 2 weeks Not at all Not at all Not at all     NITHIN-7 SCORE 12/4/2019 6/12/2020 8/24/2020   Total Score - - -   Total Score 1 3 10         SUBSTANCE USE/PSYCHIATRIC DIAGNOSES                                                                                                    Alcohol Use Disorder, Severe  Persistent Depressive Disorder vs Alcohol induced depression      ASSESSMENT/PLAN                                                       This patient is a 75 year old female with the above diagnoses, seen for initial evaluation by addiction medicine/psychiatry.      Substance use disorders:   Would like to start on anticraving medication and start back with AA until she goes to [facility] for residential treatment.      Mental health concerns:   Persistant depressive disorder versus alcohol induced depression.  Has done well in the past on Remeron and would like to consider restarting this. Additionally would  like to see therapist again.      Reports no suicidal ideations, no plan or intent, no current safety concerns.    Further diagnostic clarification is not needed.  There are no medical comorbidities which impact this treatment.    [place] PRESCRIPTION MONITORING PROGRAM [] was checked today:  not using controlled substances besides prescribed below.      PLAN                                    1. Alcohol Use Disorder  - Start Naltrexone 50 mg daily  - Return to previous AA meetings  - Continue to work with [facility] for residential treatment  - Continue Gabapentin 300 mg in AM and 600 PM though as cutting down could increase dose to 600 mg TID if withdrawal symptoms    2. Mental Health  - Would like to see therapist through [facility] if able, referral sent  - Restart Remeron  - Discontinue Trazodone       NEXT DUE:  LABS- N/A                          RTC: 4 weeks    CRISIS NUMBERS:   Provided routinely in AVS.    TREATMENT RISK STATEMENT:  The risks, benefits, alternatives and potential adverse effects have been explained and are understood by the pt. The pt agrees to the treatment plan with the ability to do so. The pt knows to call the clinic for any problems or to access emergency care if needed.  Medical and CD concerns are documented above.  Psychotropic drug interaction check was done, including changes made today, and is discussed above.    ADDICTION FELLOW: Gonzalo Perez DO    Patient seen by and discussed with staff psychiatrist, Dr. Marin. Supervisor is Dr. Marin     TELEHEALTH ATTESTATION  Following the ACGME guidelines on telemedicine and direct supervision due to COVID-19, I was concurrently participating in and/or monitoring the patient care through appropriate telecommunication technology.  I discussed the key portions of the service with the fellow, including the mental status examination and developing the plan of care. I reviewed key portions of the history with the fellow. I agree with the  "findings and plan as documented in this note.\"     MD Rochelle     "

## 2020-10-21 NOTE — PROGRESS NOTES
"VIDEO VISIT  Kavitha Headley is a 75 year old patient who is being evaluated via a billable video visit.      The patient has been notified of following:   \"This video visit will be conducted via a call between you and your physician/provider. We have found that certain health care needs can be provided without the need for an in-person physical exam. This service lets us provide the care you need with a video conversation. If a prescription is necessary we can send it directly to your pharmacy. If lab work is needed we can place an order for that and you can then stop by our lab to have the test done at a later time. Insurers are generally covering virtual visits as they would in-office visits so billing should not be different than normal.  If for some reason you do get billed incorrectly, you should contact the billing office to correct it and that number is in the AVS .    Video Conference to be completed via:  [email or web address]    Patient has given verbal consent for video visit?:  Yes    Patient would prefer that any video invitations be sent by: Send to e-mail at: [email or web address]      How would patient like to obtain AVS?:  Mail a copy    AVS SmartPhrase [PsychAVS] has been placed in 'Patient Instructions':  Yes    "

## 2020-10-22 ENCOUNTER — HOSPITAL ENCOUNTER (OUTPATIENT)
Dept: BEHAVIORAL HEALTH | Facility: CLINIC | Age: 75
Discharge: HOME | End: 2020-10-22
Attending: FAMILY MEDICINE | Admitting: FAMILY MEDICINE
Payer: COMMERCIAL

## 2020-10-22 ENCOUNTER — TELEPHONE (OUTPATIENT)
Dept: PALLIATIVE MEDICINE | Facility: CLINIC | Age: 75
End: 2020-10-22

## 2020-10-22 VITALS — WEIGHT: 155 LBS | BODY MASS INDEX: 26.46 KG/M2 | HEIGHT: 64 IN

## 2020-10-22 PROCEDURE — H0001 ALCOHOL AND/OR DRUG ASSESS: HCPCS | Mod: HF,95

## 2020-10-22 ASSESSMENT — ANXIETY QUESTIONNAIRES
5. BEING SO RESTLESS THAT IT IS HARD TO SIT STILL: NOT AT ALL
6. BECOMING EASILY ANNOYED OR IRRITABLE: SEVERAL DAYS
3. WORRYING TOO MUCH ABOUT DIFFERENT THINGS: SEVERAL DAYS
7. FEELING AFRAID AS IF SOMETHING AWFUL MIGHT HAPPEN: NOT AT ALL
IF YOU CHECKED OFF ANY PROBLEMS ON THIS QUESTIONNAIRE, HOW DIFFICULT HAVE THESE PROBLEMS MADE IT FOR YOU TO DO YOUR WORK, TAKE CARE OF THINGS AT HOME, OR GET ALONG WITH OTHER PEOPLE: NOT DIFFICULT AT ALL
1. FEELING NERVOUS, ANXIOUS, OR ON EDGE: SEVERAL DAYS
2. NOT BEING ABLE TO STOP OR CONTROL WORRYING: NOT AT ALL
4. TROUBLE RELAXING: NOT AT ALL
GAD7 TOTAL SCORE: 3

## 2020-10-22 ASSESSMENT — PAIN SCALES - GENERAL: PAINLEVEL: MODERATE PAIN (4)

## 2020-10-22 ASSESSMENT — MIFFLIN-ST. JEOR: SCORE: 1173.08

## 2020-10-22 ASSESSMENT — PATIENT HEALTH QUESTIONNAIRE - PHQ9: SUM OF ALL RESPONSES TO PHQ QUESTIONS 1-9: 6
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
## 2020-10-23 DIAGNOSIS — F32.A ANXIETY AND DEPRESSION: ICD-10-CM

## 2020-10-23 DIAGNOSIS — F41.9 ANXIETY AND DEPRESSION: ICD-10-CM

## 2020-10-23 RX ORDER — HYDROXYZINE HYDROCHLORIDE 25 MG/1
TABLET, FILM COATED ORAL
Qty: 90 TABLET | Refills: 3 | Status: SHIPPED | OUTPATIENT
Start: 2020-10-23 | End: 2021-01-27

## 2020-10-23 ASSESSMENT — ANXIETY QUESTIONNAIRES: GAD7 TOTAL SCORE: 3

## 2020-10-23 NOTE — TELEPHONE ENCOUNTER
Injection scheduled for 10/28. Video visit scheduled for 10/27.     EMILIA RocaN, RN-BC  Patient Care Supervisor  [facility]

## 2020-10-27 ENCOUNTER — TELEPHONE (OUTPATIENT)
Dept: PALLIATIVE MEDICINE | Facility: CLINIC | Age: 75
End: 2020-10-27

## 2020-10-27 ENCOUNTER — VIRTUAL VISIT (OUTPATIENT)
Dept: PALLIATIVE MEDICINE | Facility: CLINIC | Age: 75
End: 2020-10-27
Payer: COMMERCIAL

## 2020-10-27 DIAGNOSIS — M79.18 CHRONIC MYOFASCIAL PAIN: Primary | ICD-10-CM

## 2020-10-27 DIAGNOSIS — G89.29 CHRONIC MYOFASCIAL PAIN: Primary | ICD-10-CM

## 2020-10-27 PROCEDURE — 99213 OFFICE O/P EST LOW 20 MIN: CPT | Mod: 95 | Performed by: PHYSICAL MEDICINE & REHABILITATION

## 2020-10-27 RX ORDER — METHOCARBAMOL 500 MG/1
500 TABLET, FILM COATED ORAL 2 TIMES DAILY PRN
Qty: 60 TABLET | Refills: 0 | Status: SHIPPED | OUTPATIENT
Start: 2020-10-27 | End: 2020-12-21

## 2020-10-27 ASSESSMENT — PAIN SCALES - GENERAL: PAINLEVEL: MODERATE PAIN (4)

## 2020-10-27 NOTE — TELEPHONE ENCOUNTER
Spoke to patient, reminded patient of date, time and location of appointment.      Reminded patient to reschedule appointment if been in contact with some who has been suspected or confirmed of covid-19. New or worsening symptoms of cough, fever, rash or shortness of breath.     Reminded patient to eat and drink as usual, hold any blood thinners or pain medications that they were asked to hold per nurse.    Reminded patient they will need a  for this appointment and requested that the  stays out of the clinic unless its medically necessary.    Reminded patient that both patient and  must wear a mask during their visit.      Allyn Mckeon MA  [facility]

## 2020-10-27 NOTE — PROGRESS NOTES
"Kavitha Headley is a 75 year old female who is being evaluated via a billable video visit.      The patient has been notified of following:     \"This video visit will be conducted via a call between you and your physician/provider. We have found that certain health care needs can be provided without the need for an in-person physical exam.  This service lets us provide the care you need with a video conversation.  If a prescription is necessary we can send it directly to your pharmacy.  If lab work is needed we can place an order for that and you can then stop by our lab to have the test done at a later time.    Video visits are billed at different rates depending on your insurance coverage.  Please reach out to your insurance provider with any questions.    If during the course of the call the physician/provider feels a video visit is not appropriate, you will not be charged for this service.\"    Patient has given verbal consent for Video visit? Yes  How would you like to obtain your AVS? Mail a copy  If you are dropped from the video visit, the video invite should be resent to: Text to cell phone: . [phone]    Will anyone else be joining your video visit? No       Hyacinth Olvera CMA    Video-Visit Details    Type of service:  Video Visit    Video Start Time: 3:43 PM  Video End Time: 4:04 PM    Originating Location (pt. Location): Home    Distant Location (provider location):  [facility] [place]     Platform used for Video Visit: [employer]                              [facility]    Date of visit: 10/27/2020    Chief complaint:   Chief Complaint   Patient presents with     Pain       Interval history:  Kavitha Headley is a 75 year old female last seen by me on 9/29/2020.      Recommendations/plan at the last visit included:  1. Physical Therapy: Will discuss once MRI is available.  2. Clinical Health Pain Psychologist: Recommend she complete chem dep " treatment first.   3. Self Care Recommendations: Gentle progressive exercise that does not increase pain - gradually increase daily walking program.  Take mini breaks - 5 minutes of mindfullness a couple times a day.   4. Diagnostic Studies: Lumbar MRI ordered  5. Medication Management:   1. No changes  2. Avoid alcohol with all prescription medications.   3. Avoid using NSAIDs daily due to CKD.  6. Further procedures recommended: Consider lumbar mbb/RFA vs facet injections.  7. Follow up: Based on MRI results.    Since her last visit, Kavitha Headley reports:  -She is scheduled for an lumbar epidural steroid injection tomorrow for chronic low back and hip/buttock pain on the left. She has had epidurals with improvement in the past.    -She is also having pain between her shoulder blades and wants to discuss this further.  -These symptoms have been flaring on and off for years.  -She has a deep aching pain between and above her shoulder blades. When the pain is severe she can have paresthesias in her upper extremities.  -Denies any exertional pain s ymptoms.    -She stopped baclofen due to drowsiness.  -Her antidepressants were changed to mirtazapine and she reports significant improvement in her mood with this versus the lexapro and wellbutrin.    Pain scores:  Pain intensity on average is 7 on a scale of 0-10.        Pain Treatments:  1. Medications:       Current pain medications:  -Gabapentin 300mg TID - helps a little  -Baclofen 5mg TID prn - helps along with gabapentin  -Ibuprofen 800mg prn       Previous pain medications:  -Percocet  2. Physical Therapy: helpful in the past               TENS unit: helpful, doesn't help when pain is severe  3. Pain psychology: hasn't tried  4. Surgery: no back surgeries  5. Injections: left L4 epidurals at [facility] with 2-3 months of relief  6. Alternative Therapies:               Chiropractic: did this in the past, didn't help with back pain              Acupuncture: hasn't  tried    Side Effects: no side effect    Medications:  Current Outpatient Medications   Medication Sig Dispense Refill     aspirin 81 MG EC tablet Take 81 mg by mouth daily       aspirin-acetaminophen-caffeine (EXCEDRIN MIGRAINE) 250-250-65 MG tablet Take 2 tablets by mouth daily as needed for headaches       atenolol (TENORMIN) 25 MG tablet TAKE 1 TABLET (25 MG) BY MOUTH EVERY MORNING 90 tablet 3     baclofen (LIORESAL) 10 MG tablet TAKE 1 TABLET BY MOUTH TWICE A DAY 60 tablet 0     BIOTIN PO Take 1 tablet by mouth every morning       folic acid (FOLVITE) 1 MG tablet Take 1 mg by mouth daily       furosemide (LASIX) 20 MG tablet TAKE 1 TABLET DAILY 90 tablet 1     gabapentin (NEURONTIN) 300 MG capsule TAKE 1 CAPSULE THREE TIMES A  capsule 0     hydrOXYzine (ATARAX) 25 MG tablet TAKE 1 TABLET BY MOUTH EVERY 6 HOURS AS NEEDED FOR ANXIETY 90 tablet 3     mirtazapine (REMERON) 15 MG tablet Take 1 tablet (15 mg) by mouth At Bedtime 30 tablet 0     Multiple Vitamins-Minerals (CENTRUM SILVER) per tablet Take 1 tablet by mouth daily       naltrexone (DEPADE/REVIA) 50 MG tablet Take 1 tablet (50 mg) by mouth daily 30 tablet 0     polyethylene glycol (MIRALAX/GLYCOLAX) Packet Take 17 g by mouth daily as needed (constipation) 7 packet      senna-docusate (SENOKOT-S;PERICOLACE) 8.6-50 MG per tablet Take 1-2 tablets by mouth 2 times daily as needed for constipation 100 tablet      traZODone (DESYREL) 100 MG tablet TAKE 1 TABLET NIGHTLY AS NEEDED FOR SLEEP 90 tablet 3     vitamin C (ASCORBIC ACID) 250 MG tablet Take 250 mg by mouth daily         Medical History: any changes in medical history since they were last seen? No    Review of Systems:  The 14 system ROS was reviewed from the intake questionnaire, and is positive for: back pain, upper back pain, paresthesias  Any bowel or bladder problems: denies  Mood: anxiety      Assessment:  Ms. Headley is a 74 year old with past medical history including: HLD, HTN, GERD, CAD,  "Paroxysmal SVT, CKD Stage 3, Anxiety, Depression, PTSD, Alcohol abuse who presents for evaluation and treatment of the following chronic pain conditions:    1. Chronic low back pain: Patient describes pain across the belt line and into the left hip and buttock. She denies any red flag signs or symptoms today. She reports having \"disintegrating discs\" in her low back and that epidural injections have been beneficial in the past. MRI repeated and shows widespread moderate DDD and mod foraminal stenosis at multiple levels. Repeat lumbar epidural steroid injection is scheduled for 10/28/2020.    2. Upper back pain: Based on history and description of symptoms, likely myofascial in nature. She may have trigger points of the rhomboids, trapezius versus scapular dyskinesia. Will evaluate further tomorrow at the lumbar epidural steroid injection appointment and provide the proper exercises. Patient defers PT at this time as she has numerous ongoing medical appointments currently.    Since our initial visit she has reduced her alcohol intake significantly. Has not used in over a week and is currently on naltrexone. Initially I was hesitant regarding prescribing medications but with her recent change, I feel that muscle relaxants may be reasonable for her upper back pain. Methocarbamol was prescribed. I did discuss that her current medications when used in conjunction with alcohol can lead to serious side effects.    Mental Health - the patient's mental health concerns, specifically anxiety, depression, PTSD, alcohol abuse, affect her experience of pain and contribute to her clinically significant distress.  Should be noted that her low back pain symptoms have been ongoing for 50+ years intermittently but constant and severe over the past 2 years. 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        Plan:  The following recommendations were given to the " patient. Diagnosis, treatment options, risks, benefits, and alternatives were discussed, and all questions were answered. The patient expressed understanding of the plan for management.     I am recommending a multidisciplinary treatment plan to help this patient better manage her pain.  This includes:     1. Physical Therapy: Discussed working with our chronic pain PT as they can do video visits. Patient defers, do recommend this for her upper back pain.  2. Clinical Health Pain Psychologist: Recommend she complete chem dep treatment first.   3. Self Care Recommendations: Gentle progressive exercise that does not increase pain - gradually increase daily walking program.  Take mini breaks - 5 minutes of mindfullness a couple times a day.   4. Diagnostic Studies: None at this time  5. Medication Management:   6. Stop baclofen  7. Start methocarbamol 500mg BID prn  8. Continue gabapentin 300mg TID  9. Avoid alcohol with all prescription medications.   10. Avoid using NSAIDs daily due to CKD.  11. Further procedures recommended: lumbar epidural steroid injection pending 10/28/2020  12. Follow up: 1 month post lumbar epidural steroid injection.           Ketan Garcia DO  [facility]

## 2020-10-27 NOTE — PATIENT INSTRUCTIONS
1. You will be provided with exercises for your upper back at the lumbar epidural steroid injection appointment.    2. Start methocarbamol 500mg twice daily as needed for upper back spasms. Do not take while driving or operating machinery until you know how this medication affects you, it could cause to to be drowsy and have some dizziness.    Take care,    DO [facility]        ----------------------------------------------------------------  Clinic Number:  [phone]     Call with any questions about your care and for scheduling assistance.     Calls are returned Monday through Friday between 8 AM and 4:30 PM. We usually get back to you within 2 business days depending on the issue/request.    If we are prescribing your medications:    For opioid medication refills, call the clinic or send a [employer] message 7 days in advance.  Please include:    Name of requested medication    Name of the pharmacy.    For non-opioid medications, call your pharmacy directly to request a refill. Please allow 3-4 days to be processed.     Per [place] State Law:    All controlled substance prescriptions must be filled within 30 days of being written.      For those controlled substances allowing refills, pickup must occur within 30 days of last fill.      We believe regular attendance is key to your success in our program!      Any time you are unable to keep your appointment we ask that you call us at least 24 hours in advance to cancel.This will allow us to offer the appointment time to another patient.     Multiple missed appointments may lead to dismissal from the clinic.

## 2020-10-28 ENCOUNTER — RADIOLOGY INJECTION OFFICE VISIT (OUTPATIENT)
Dept: PALLIATIVE MEDICINE | Facility: CLINIC | Age: 75
End: 2020-10-28
Payer: COMMERCIAL

## 2020-10-28 ENCOUNTER — ANCILLARY PROCEDURE (OUTPATIENT)
Dept: GENERAL RADIOLOGY | Facility: CLINIC | Age: 75
End: 2020-10-28
Attending: PHYSICAL MEDICINE & REHABILITATION
Payer: COMMERCIAL

## 2020-10-28 VITALS — HEART RATE: 74 BPM | SYSTOLIC BLOOD PRESSURE: 143 MMHG | OXYGEN SATURATION: 97 % | DIASTOLIC BLOOD PRESSURE: 73 MMHG

## 2020-10-28 DIAGNOSIS — M54.16 LUMBAR RADICULOPATHY: ICD-10-CM

## 2020-10-28 PROCEDURE — 62323 NJX INTERLAMINAR LMBR/SAC: CPT | Performed by: PHYSICAL MEDICINE & REHABILITATION

## 2020-10-28 NOTE — NURSING NOTE
Discharge Information    IV Discontiued Time:  NA    Amount of Fluid Infused:  NA    Discharge Criteria = When patient returns to baseline or as per MD order    Consciousness:  Pt is fully awake    Circulation:  BP +/- 20% of pre-procedure level    Respiration:  Patient is able to breathe deeply    O2 Sat:  Patient is able to maintain O2 Sat >92% on room air    Activity:  Moves 4 extremities on command    Ambulation:  Patient is able to stand and walk or stand and pivot into wheelchair    Dressing:  Clean/dry or No Dressing    Notes:   Discharge instructions and AVS given to patient    Patient meets criteria for discharge?  YES    Admitted to PCU?  No    Responsible adult present to accompany patient home?  Yes    Signature/Title:    Haydee Luke RN Care Coordinator  [facility] [place]

## 2020-10-28 NOTE — PROGRESS NOTES
Pre-procedure Intake    Have you been fasting? NA    If yes, for how long?     Are you taking a prescribed blood thinner such as coumadin, Plavix, Xarelto?    No    If yes, when did you take your last dose?     Do you take aspirin?  Yes -   Plavix    If cervical procedure, have you held aspirin for 6 days?   NA    Do you have any allergies to contrast dye, iodine, steroid and/or numbing medications?  NO    Are you currently taking antibiotics or have an active infection?  NO    Have you had a fever/elevated temperature within the past week? NO    Are you currently taking oral steroids? NO    Do you have a ? Yes       Are you pregnant or breastfeeding?  Not Applicable    Are the vital signs normal?  Yes

## 2020-10-28 NOTE — PROGRESS NOTES
[facility] - Procedure Note    Date of Visit: 10/28/2020    Procedure performed: Lumbar 4-5 interlaminar epidural steroid injection  Diagnosis: Lumbar spondylosis; Lumbar radiculitis/radiculopathy  : Ketan Garcia DO & Fabiana Harrison MD (Fellow)  Anesthesia: none    Indications: Kavitha Headley is a 75 year old female who is seen at the request of Dr. Garcia for a lumbar epidural steroid injection. The patient describes pain in the low back that radiates into the left hip and buttock. The patient has been exhibiting symptoms consistent with lumbar intraspinal inflammation and radiculopathy. Symptoms have been persistent, disabling, and intermittently severe. The patient reports minimal improvement with conservative treatment, including oral medications and exercises. She has had prior lumbar epidural steroid injection with significant benefit but it has been years.    Lumbar MRI was done on 10/9/2020 which showed   FINDINGS: The report is dictated assuming five lumbar-type vertebral  bodies, and radiographic correlation may be necessary. There is  curvature of the lumbar spine convex towards the right. The distal  spinal cord and cauda equina appear normal.  The tip of the conus is  at the T12-L1 level.  The bone marrow appears normal.  The paraspinal  soft tissues appear normal.     T12-L1:  Loss of T2 signal from the disc. Loss of disc space height.  Diffuse annular disc bulge. Central canal and neural foramen are  patent.      L1-L2:  Loss of T2 signal from the disc. Loss of disc space height.  Diffuse annular disc bulge. Central canal is normal. Mild left  foraminal stenosis.     L2-L3:  Loss of T2 signal from the disc. Loss of disc space height.  Diffuse annular disc bulge. Degenerative change in the facet joints.  Mild bilateral foraminal stenosis.     L3-L4:  Loss of T2 signal from the disc. Loss of disc space height.  Diffuse annular disc bulge. Degenerative change  in the facet joints.  Central canal mildly narrowed. Mild bilateral foraminal stenosis.     L4-L5:  Loss of T2 signal from the disc. Loss of disc space height.  Diffuse annular disc bulge. Moderate degenerative change in the facet  joints. Central canal is normal. Mild right foraminal stenosis and  moderate left foraminal stenosis.     L5-S1:  Loss of T2 signal from the disc. Mild annular disc bulge. Mild  degenerative change in the facet joints. Central canal normal. Mild  bilateral foraminal stenosis.                                                                      IMPRESSION:  1.  Multilevel degenerative disease.  2. No focal disc herniations are identified.     EDGARDO COMER MD    Allergies:      Allergies   Allergen Reactions     Bactrim [Sulfamethoxazole W/Trimethoprim] Hives     Codeine Itching     NAUSEA     Morphine Itching     NAUSEA        Vitals:  BP (!) 143/73 (BP Location: Left arm, Cuff Size: Adult Regular)   Pulse 74   SpO2 97%     Review of Systems: The patient denies recent fever, chills, illness, use of antibiotics or anticoagulants. All other 10-point review of systems negative.     Procedure: The procedure and risks were explained, and informed written consent was obtained from the patient. Risks include but are not limited to: infection, bleeding, increased pain, and damage to soft tissue, nerve, muscle, and vasculature structures. After getting informed consent, patient was brought into the procedure suite and was placed in a prone position on the procedure table. A Pause for the Cause was performed. Patient was prepped and draped in sterile fashion.     The 4-5 interspace was identified with use of fluoroscopy in AP view. A 25-gauge, 1.5 inch needle was used to anesthetize the skin and subcutaneous tissue entry site with a total of 2 ml of 1% lidocaine. Under fluoroscopic visualization, a 22-gauge, 4.5 inch Tuohy epidural needle was slowly advanced towards the epidural space a few  millimeters left-sided of midline. The latter part of the needle advancement was guided with fluoroscopy in the lateral view. The epidural space was identified using loss of resistance technique. After negative aspiration for heme and cerebrospinal fluid, a total of 1 mL of non-ionic contrast was injected to confirm needle placement with 9 mL of contrast wasted. Epidurogram confirmed spread within the posterior epidural space. 1 ml of 40mg/ml of triamcinolone, 1 ml of 1% lidocaine, and 3 ml of preservative free saline was injected. The needle was removed.  Images were saved to PACS.    The patient tolerated the procedure well, and there was no evidence of procedural complications. No new sensory or motor deficits were noted following the procedure. The patient was stable and able to ambulate on discharge home. Post-procedure instructions were provided.     Pre-procedure pain score: 4/10 in the back, 4/10 in the leg  Post-procedure pain score: 4/10 in the back, 4/10 in the leg    Assessment/Plan: Kavitha Headley is a 75 year old female s/p lumbar interlaminar epidural steroid injection today for lumbar spondylosis and radiculitis/radiculopathy.     1. Following today's procedure, the patient was advised to contact the [facility] for any of the following:   Fever, chills, or night sweats   New onset of pain, numbness, or weakness   Any questions/concerns regarding the procedure  If unable to contact the Pain Center, the patient was instructed to go to a local Emergency Room for any complications.   2. The patient will receive a follow-up call in 1 week.   3. Follow-up with the referring provider in 2 weeks for post-procedure evaluation.        Ketan Garcia DO  [facility] [place]

## 2020-10-28 NOTE — PATIENT INSTRUCTIONS
[facility] Procedure Discharge Instructions    Today you saw:   Dr. Ketan Garcia    Your procedure:  Epidural steroid injection      Medications used:  Lidocaine (anesthetic)   Kenalog (steroid)  Omnipaque (contrast)    Normal Saline             Be cautious when walking as numbness and/or weakness in the legs may occur up to 6-8 hours after the procedure due to effect of the local anesthetic    Do not drive for 6 hours. The effect of the local anesthetic could slow your reflexes.     Avoid strenuous activity for the first 24 hours. You may resume your regular activities after that.     You may shower, however avoid swimming, tub baths or hot tubs for 24 hours following your procedure    You may have a mild to moderate increase in pain for several days following the injection.      You may use ice packs for 10-15 minutes, 3 to 4 times a day at the injection site for comfort    Do not use heat to painful areas for 6 to 8 hours. This will give the local anesthetic time to wear off and prevent you from accidentally burning your skin.    Unless you have been directed to avoid the use of anti-inflammatory medications (NSAIDS-ibuprofen, Aleve, Motrin), you may use these medications or Tylenol for pain control if needed.     With diabetes, check your blood sugar more frequently than usual as your blood sugar may be higher than normal for 10-14 days following a steroid injection. Contact your doctor who manages your diabetes if your blood sugar is higher than usual    Possible side effects of steroids that you may experience include flushing, elevated blood pressure, increased appetite, mild headaches and restlessness.  All of these symptoms will get better with time.    It may take up to 14 days for the steroid medication to start working although you may feel the effect as early as a few days after the procedure.     Follow up with your referring provider in 2-3 weeks      If you experience any of  the following, call the pain center line during work hours at [phone] or on-call physician after hours at [phone]:  -Fever over 100 degree F  -Swelling, bleeding, redness, drainage, warmth at the injection site  -Progressive weakness or numbness in your legs  -Loss of bowel or bladder function  -Unusual headache that is not relieved by Tylenol or your regular headache medication  -Unusual new onset of pain that is not improving

## 2020-11-04 ENCOUNTER — TELEPHONE (OUTPATIENT)
Dept: PALLIATIVE MEDICINE | Facility: CLINIC | Age: 75
End: 2020-11-04

## 2020-11-04 NOTE — TELEPHONE ENCOUNTER
Can take steroids up to 2 weeks to take full effect, will discuss further at f/up.    DO Carlos [facility]

## 2020-11-04 NOTE — TELEPHONE ENCOUNTER
Patient had a Lumbar 4-5 interlaminar epidural steroid injection on 8/27/20.  Called patient for an update.      Pt reported the following details:  Said the injection was working very well for a couple of days but seems to be wearing off now.   Told patient that the information will be forwarded to her provider.  Also explained that, if a steroid medication was used, it could take up to 14 days to feel the full effect and if pt has any further questions or concerns pt should call the clinic at [phone].

## 2020-11-09 ENCOUNTER — TELEPHONE (OUTPATIENT)
Dept: PSYCHIATRY | Facility: CLINIC | Age: 75
End: 2020-11-09

## 2020-11-09 NOTE — TELEPHONE ENCOUNTER
I called Linda to see if she was having difficulty connecting for our session, but she was not available. I left voicemail encouraging her to call the clinic if she would like to reschedule.

## 2020-11-11 ENCOUNTER — VIRTUAL VISIT (OUTPATIENT)
Dept: PSYCHIATRY | Facility: CLINIC | Age: 75
End: 2020-11-11
Attending: PSYCHIATRY & NEUROLOGY
Payer: COMMERCIAL

## 2020-11-11 DIAGNOSIS — F34.1 DYSTHYMIA: ICD-10-CM

## 2020-11-11 DIAGNOSIS — F51.04 CHRONIC INSOMNIA: ICD-10-CM

## 2020-11-11 DIAGNOSIS — F10.20 ALCOHOL USE DISORDER, SEVERE, DEPENDENCE (H): ICD-10-CM

## 2020-11-11 PROCEDURE — 99214 OFFICE O/P EST MOD 30 MIN: CPT | Mod: GC

## 2020-11-11 RX ORDER — NALTREXONE HYDROCHLORIDE 50 MG/1
50 TABLET, FILM COATED ORAL DAILY
Qty: 30 TABLET | Refills: 2 | Status: SHIPPED | OUTPATIENT
Start: 2020-11-11 | End: 2021-03-03

## 2020-11-11 RX ORDER — MIRTAZAPINE 15 MG/1
15 TABLET, FILM COATED ORAL AT BEDTIME
Qty: 90 TABLET | Refills: 0 | Status: SHIPPED | OUTPATIENT
Start: 2020-11-11 | End: 2021-02-18

## 2020-11-11 RX ORDER — TRAZODONE HYDROCHLORIDE 100 MG/1
100 TABLET ORAL AT BEDTIME
Qty: 90 TABLET | Refills: 3 | Status: SHIPPED | OUTPATIENT
Start: 2020-11-11 | End: 2021-03-03

## 2020-11-11 ASSESSMENT — PAIN SCALES - GENERAL: PAINLEVEL: MODERATE PAIN (4)

## 2020-11-11 NOTE — PATIENT INSTRUCTIONS
Thank you for coming to the [facility] [place] [facility].    Lab Testing:  If you had lab testing today and your results are reassuring or normal they will be mailed to you or sent through Twitter within 7 days. If the lab tests need quick action we will call you with the results. The phone number we will call with results is # [phone] (home) . If this is not the best number please call our clinic and change the number.    Medication Refills:  If you need any refills please call your pharmacy and they will contact us. Our fax number for refills is [phone]. Please allow three business for refill processing. If you need to  your refill at a new pharmacy, please contact the new pharmacy directly. The new pharmacy will help you get your medications transferred.     Scheduling:  If you have any concerns about today's visit or wish to schedule another appointment please call our office during normal business hours [phone] (8-5:00 M-F)    Contact Us:  Please call [phone] during business hours (8-5:00 M-F).  If after clinic hours, or on the weekend, please call  [phone].    Financial Assistance [phone]  [facility] Billing [phone]  Central Billing Office, [facility]: [phone]  [facility] [phone]  Medical Records [phone]  [facility] Patient Bill of Rights [email or web address]       MENTAL HEALTH CRISIS NUMBERS:  For a medical emergency please call  911 or go to the nearest ER.     [facility]:   [facility] -[phone]   Crisis Residence [facility] -[phone]   Walk-In Counseling Center \[facility] [place] [facility]\"" -[phone]   COPE 24/7 [place] Mobile Team -[phone] (adults)/[phone] (child)  CHILD: [facility] needs assessment team - [phone]      [facility]:   [facility] - [phone]   Walk-in counseling [facility]  [facility] - [phone]   Walk-in counseling [facility] - [phone]   Crisis Residence [facility] - [phone]  Urgent Care Adult Mental Vqfgvq-[phone] mobile unit/ 24/7 crisis line    National Crisis Numbers:   National Suicide Prevention Lifeline: 3-631-460-TALK (642-157-3681)  Poison Control Center - 1-707.690.4267  [place]/resources for a list of additional resources (SOS)  Trans Lifeline a hotline for transgender people 8-283-050-3315  The [employer] a hotline for LGBT youth 1-740.990.5809  Crisis Text Line: For any crisis 24/7   To: 333104  see www.crisistextline.org  - IF MAKING A CALL FEELS TOO HARD, send a text!         Again thank you for choosing [facility] and please let us know how we can best partner with you to improve you and your family's health.    You may be receiving a survey regarding this appointment. We would love to have your feedback, both positive and negative. The survey is done by an external company, so your answers are anonymous.

## 2020-11-11 NOTE — PROGRESS NOTES
"  ----------------------------------------------------------------------------------------------------------  [facility] Progress Note  Addiction Medicine/Psychiatry                      VIDEO VISIT  Kavitha Headley is a 75 year old patient who is being evaluated via a billable video visit.      The patient has been notified of following:   \"We have found that certain health care needs can be provided without the need for an in-person physical exam. This service lets us provide the care you need with a video conversation. If a prescription is necessary we can send it directly to your pharmacy. If lab work is needed we can place an order for that and you can then stop by our lab to have the test done at a later time. Insurers are generally covering virtual visits as they would in-office visits so billing should not be different than normal.  If for some reason you do get billed incorrectly, you should contact the billing office to correct it and that number is in the AVS .    Patient has given verbal consent for video visit?: Yes   How would you like to obtain your AVS?: Patient declined  AVS SmartPhrase [PsychAVS] has been placed in 'Patient Instructions': N/A      Video- Visit Details  Type of service:  video visit for medication management  Time of service:    Date:  11/11/2020    Video Start Time:  10:45 AM        Video End Time:  11:04 AM    Reason for video visit:  Patient unable to travel due to Covid-19  Originating Site (patient location):  [facility]   Location- Patient's home  Distant Site (provider location):  [facility]  Mode of Communication:  Video Conference via [email or web address]  Consent:  Patient has given verbal consent for video visit?: Yes         IDENTIFICATION:  Kavitha Headley is a 75 year old female who was referred by self for evaluation of alcohol use disorder.  History was provided by patient who was a good historian.  Patient " was seen for initial evaluation on 10/21/2020.  SUBJECTIVE / INTERIM HISTORY                                                 Background: (Detailed psychiatric and substance history below)  History of alcohol use disorder.  Didn't drink much until her 50s.  Amount consumes has varied since that time.  Additional history of depression.  Outpatient treatment on one occasion several years ago.        The pt was last seen in clinic 10/21/2020 at which time the following changes were made:  - Start Naltrexone 50 mg daily  - Return to previous AA meetings  The patient did make the change(s). The patient reports good medication adherence.   There was no concern for pt safety at the time of the last visit.      Interim history: Since visit on 10/21/2020 feels like she is doing well.  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 Started naltrexone and has not drank since 10/16/2020.  Still on waiting list for inpatient treatment though unsure as to if she needs it at this point.  No meetings currently though will be looking into it and plans to join virtually tonight.  No side effects from medications.    Current Substance Use- None since 10/16/2020.   Current recovery activities:  Working on getting to a meeting [facility] that she previously connected with.                MEDICAL ROS- Denies     RELEVANT SOCIAL/FAMILY                                                     Patient  Reported     Employment/Financial Support- interviewed for job at assisted living [employer] [employer].    Living Situation/Family/Relationships- lives in [place] with significant other. Children- adult not living with her.    PSYCHIATRIC and SUBSTANCE USE HISTORY      PSYCHIATRIC:      SIB [method, most recent]- None  Suicidal Ideation Hx [passive, active]- None  Suicide Attempt [#, recent, method, regret, disclosure, medical tx]- None  Violence/Aggression Hx- None  Psychosis Hx- None  Psych Hosp [ #, most recent, committed]- None  ECT [#,  most recent]- None    Therapist- Working on getting in with [facility] Sebastian,      SUBSTANCE USE HISTORY SYNOPSIS:  Past substances- Alcohol - duration: Since early 50s.  Treatment- [#, most recent] [facility] 2010, Started program at [facility] 2018 though left early.  Medical consequences- [withdrawal, sz] none.  Legal consequences- denies.    PAST MEDICATION TRIALS     Drug  Max Dose (mg) Helpful Adverse Effects    Reason Discontinued Treatment dates    Celexa 20 unsure   Didn't notice change when forgot to take Through September 2020   Wellbutrin XL  150 unsure   Didn't notice change when forgot to take Through September 2020   Mirtazapine ? yes none   Through approximately 2005; restarted 10/2020   Seroquel ? yes none   Through approximately 2005                                     UPDATED MEDICAL / SURGICAL HISTORY                pregnant [if applicable]--No     Medical problems:   Patient Active Problem List   Diagnosis     Spinal stenosis     Chronic insomnia     CKD (chronic kidney disease) stage 3, GFR 30-59 ml/min     Hip joint replacement status     Knee joint replacement status     Chronic pain syndrome     Bariatric surgery status     Mixed hyperlipidemia     Personal history of urinary calculi     Macrocytic anemia     Anxiety     Aortic stenosis     Left-sided low back pain without sciatica     PAC (premature atrial contraction)     Controlled substance agreement signed     Seasonal affective disorder (H)     HTN, goal below 140/90     Hyponatremia     Vitamin B12 deficiency (non anemic)     Severe alcohol dependence (H)     Liver disease, chronic, due to alcohol (H)     Paroxysmal supraventricular tachycardia (H)     Coronary artery disease involving native coronary artery of native heart without angina pectoris     Mild ascending aorta dilatation (H)     Psoriasis - on Edyira - Dr. Gauri Clark with dermatology     Aortic dilatation (H)     Mild major depression (H)     Thiamine deficiency      Herpes simplex virus infection     Hallux rigidus of right foot     CHF (congestive heart failure) (H)     Gastroesophageal reflux disease with esophagitis - chronic due to alcohol                            ALLERGY   Bactrim [sulfamethoxazole w/trimethoprim], Codeine, and Morphine  MEDICATIONS                                                                       bold meds Rx     Current Outpatient Medications   Medication Sig     aspirin 81 MG EC tablet Take 81 mg by mouth daily     aspirin-acetaminophen-caffeine (EXCEDRIN MIGRAINE) 250-250-65 MG tablet Take 2 tablets by mouth daily as needed for headaches     atenolol (TENORMIN) 25 MG tablet TAKE 1 TABLET (25 MG) BY MOUTH EVERY MORNING     baclofen (LIORESAL) 10 MG tablet TAKE 1 TABLET BY MOUTH TWICE A DAY     BIOTIN PO Take 1 tablet by mouth every morning     folic acid (FOLVITE) 1 MG tablet Take 1 mg by mouth daily     furosemide (LASIX) 20 MG tablet TAKE 1 TABLET DAILY     gabapentin (NEURONTIN) 300 MG capsule TAKE 1 CAPSULE THREE TIMES A DAY     hydrOXYzine (ATARAX) 25 MG tablet TAKE 1 TABLET BY MOUTH EVERY 6 HOURS AS NEEDED FOR ANXIETY     methocarbamol (ROBAXIN) 500 MG tablet Take 1 tablet (500 mg) by mouth 2 times daily as needed for muscle spasms     mirtazapine (REMERON) 15 MG tablet Take 1 tablet (15 mg) by mouth At Bedtime     Multiple Vitamins-Minerals (CENTRUM SILVER) per tablet Take 1 tablet by mouth daily     naltrexone (DEPADE/REVIA) 50 MG tablet Take 1 tablet (50 mg) by mouth daily     polyethylene glycol (MIRALAX/GLYCOLAX) Packet Take 17 g by mouth daily as needed (constipation)     senna-docusate (SENOKOT-S;PERICOLACE) 8.6-50 MG per tablet Take 1-2 tablets by mouth 2 times daily as needed for constipation     traZODone (DESYREL) 100 MG tablet TAKE 1 TABLET NIGHTLY AS NEEDED FOR SLEEP     vitamin C (ASCORBIC ACID) 250 MG tablet Take 250 mg by mouth daily     No current facility-administered medications for this visit.      PSYCHOTROPIC DRUG  INTERACTION CHECK was unremarkable     VITALS   There were no vitals taken for this visit.   PHQ9           TODAY = not identified due to COVID-19 pandemic             PHQ 6/12/2020 8/24/2020 10/22/2020   PHQ-9 Total Score 4 14 6   Q9: Thoughts of better off dead/self-harm past 2 weeks Not at all Not at all Not at all       LABS                                                                                                 none  MENTAL STATUS EXAM                                                              Alertness: alert  and oriented  Orientation: awake and alert  Appearance: well groomed  Behavior/Demeanor: cooperative and pleasant, with good  eye contact.  Speech: normal  Psychomotor: normal or unremarkable    Mood:  good  Affect: full range and was congruent to speech content.  Thought Process/Associations: unremarkable   Thought Content: devoid of  suicidal and violent ideation and delusions.   Perception: devoid of  auditory hallucinations and visual hallucinations  Insight: good.  Judgment: good.      These cognitive functions grossly appear as described, but were not formally tested.    ASSESSMENT                                                                            CURRENT: 75 year old female with history of alcohol use disorder.  Has not drank since 10/16/2020 and cravings are currently controlled.  On naltrexone.  Mood is stable with no concerns today.  On Mirtazapine, gabapentin, and hydroxyzine which are helping substantially.      There will not be medication changes today. See discussion below.        [place] PRESCRIPTION MONITORING PROGRAM [] was not checked today: will be checked next visit.        TREATMENT RISK STATEMENT: The risks, benefits, alternatives and potential adverse effects have been explained and are understood by the pt. The pt agrees to the treatment plan with the ability to do so. Discussion of specific concerns included- none. The pt knows to call the clinic for any problems  "or access emergency care if needed. There are no medical considerations relevant to treatment, as noted above.  Drug interaction check for meds prior to visit is in the MEDICATIONS section.       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  - Chronic insomnia    Substance Use:  - Alcohol Use disorder, severe      PLAN                                                                                                              1)  MEDICATIONS:         -- Continue naltrexone 50 mg daily       -- Continue mirtazapine 15 mg nightly       -- Continue gabapentin and hydroxyzine as needed       -- Continue trazodone     2)  THERAPY:        -- Referral to [facility] Shaikh sent, has not connected yet       -- Will be starting AA this evening with a group she liked previously     3)  LABS: None    4)  REFERRALS [BUBBA, medical, other]: None    5)  RTC: 4 weeks    6)  CRISIS NUMBERS: Provided routinely in S          Addiction Medicine Fellow: Gonzalo Perez MD    Patient seen and discussed with staff psychiatrist, Dr. Marin. Supervisor is Dr. Marin     TELEHEALTH ATTESTATION  Following the ACGME guidelines on telemedicine and direct supervision due to COVID-19, I was concurrently participating in and/or monitoring the patient care through appropriate telecommunication technology.  I discussed the key portions of the service with the fellow, including the mental status examination and developing the plan of care. I reviewed key portions of the history with the fellow. I agree with the findings and plan as documented in this note.\"     MD Rochelle         "

## 2020-11-11 NOTE — PROGRESS NOTES
"VIDEO VISIT  Kavitha Headley is a 75 year old patient who is being evaluated via a billable video visit.      The patient has been notified of following:   \"This video visit will be conducted via a call between you and your physician/provider. We have found that certain health care needs can be provided without the need for an in-person physical exam. This service lets us provide the care you need with a video conversation. If a prescription is necessary we can send it directly to your pharmacy. If lab work is needed we can place an order for that and you can then stop by our lab to have the test done at a later time. Insurers are generally covering virtual visits as they would in-office visits so billing should not be different than normal.  If for some reason you do get billed incorrectly, you should contact the billing office to correct it and that number is in the AVS .    Video Conference to be completed via:  [email or web address]    Patient has given verbal consent for video visit?:  Yes    Patient would prefer that any video invitations be sent by: Send to e-mail at: [email or web address]     How would patient like to obtain AVS?:  Mail a copy    AVS SmartPhrase [PsychAVS] has been placed in 'Patient Instructions':  Yes    "

## 2020-11-24 DIAGNOSIS — G89.4 CHRONIC PAIN SYNDROME: ICD-10-CM

## 2020-11-25 NOTE — TELEPHONE ENCOUNTER
Routing refill request to provider for review/approval because:  Drug not on the [facility] refill protocol     Pankaj MYERS RN   [facility]

## 2020-11-27 RX ORDER — BACLOFEN 10 MG/1
TABLET ORAL
Qty: 60 TABLET | Refills: 0 | Status: SHIPPED | OUTPATIENT
Start: 2020-11-27 | End: 2020-12-23

## 2020-11-27 NOTE — TELEPHONE ENCOUNTER
Pt overdue for fasting physical w/labs - no showed in Sept.  One mlyene refill sent to pharmacy.

## 2020-11-27 NOTE — TELEPHONE ENCOUNTER
General Call:     Who is calling:  Linda    Reason for Call:  Linda is calling saying the pharmacy put in the request to refill her Baclofen. She is hoping this will be refilled today.    Okay to leave a detailed message:Yes at Cell number on file:    Telephone Information:   Mobile [phone]

## 2020-11-28 NOTE — TELEPHONE ENCOUNTER
Attempt # 1  Called #   Telephone Information:   Mobile [phone]       Left a non detailed VM to call back at [phone] and ask for any available Triage Nurse.    Pankaj Deluca RN   [facility]     Opt out

## 2020-11-30 NOTE — TELEPHONE ENCOUNTER
Called and left a voicemail for patient. She needs a Physical w/fasting labs. No Show in September. Last Physical is unknown. There was a mylene refill sent however, no  further refills until physical is scheduled.    Aidan MIRANDA

## 2020-12-09 ENCOUNTER — TELEPHONE (OUTPATIENT)
Dept: PSYCHIATRY | Facility: CLINIC | Age: 75
End: 2020-12-09

## 2020-12-09 NOTE — TELEPHONE ENCOUNTER
On 12/9/2020, at 1012, writer called patient at mobile to confirm Virtual Visit. Writer unable to make contact with patient. Writer left detailed voice message for callback. [phone], left as call back number. SAMANTHA Salazar, EMT

## 2020-12-20 DIAGNOSIS — G89.4 CHRONIC PAIN SYNDROME: ICD-10-CM

## 2020-12-21 DIAGNOSIS — G89.29 CHRONIC MYOFASCIAL PAIN: ICD-10-CM

## 2020-12-21 DIAGNOSIS — M79.18 CHRONIC MYOFASCIAL PAIN: ICD-10-CM

## 2020-12-21 RX ORDER — METHOCARBAMOL 500 MG/1
500 TABLET, FILM COATED ORAL 2 TIMES DAILY PRN
Qty: 60 TABLET | Refills: 4 | Status: SHIPPED | OUTPATIENT
Start: 2020-12-21 | End: 2021-03-03

## 2020-12-21 NOTE — TELEPHONE ENCOUNTER
Received fax from pharmacy requesting refill(s) for methocarbamol (ROBAXIN) 500 MG tablet     Last refilled on 10/27/20    Pt last seen on 10/28/20  Next appt scheduled for none    E-prescribe to:       [facility]/PHARMACY #[id number] - [place], [place] - [address] [facility] [address]      Will facilitate refill.    Allyn Mckeon MA  [facility] [place]

## 2020-12-21 NOTE — TELEPHONE ENCOUNTER
Patient is getting good results with her back spasms by taking the Gabapentin and Baclofen together so she is asking if pcp would switch the Bacolfen to 3 times a day to take with the Gabapentin that is also 3 times a day.  Any questions you may call her at [phone].

## 2020-12-22 NOTE — TELEPHONE ENCOUNTER
Routing to PCP for further review/recommendations/orders.  Please see message below and advise      Sara Williamson RN  [facility]

## 2020-12-22 NOTE — TELEPHONE ENCOUNTER
Recommend pain clinic determine if this is needed.  Pt saw Dr. Garcia with [facility] 10/27/2020 and this RX was discontinued.          Nayeli Castorena MBA, MS, PA-C

## 2020-12-22 NOTE — TELEPHONE ENCOUNTER
Will have nursing follow up with patient to see if baclofen is more helpful than the methocarbamol I had prescribed, if so they can continue the baclofen and I'll approve the prescription. They should not be taking both at the same time.    Ketan Garcia, DO  [facility]

## 2020-12-23 RX ORDER — BACLOFEN 10 MG/1
10 TABLET ORAL 3 TIMES DAILY
Qty: 90 TABLET | Refills: 1 | Status: SHIPPED | OUTPATIENT
Start: 2020-12-23 | End: 2021-03-03

## 2020-12-23 NOTE — TELEPHONE ENCOUNTER
"Called pt. She states that the baclofen works well for the pain, especially when combined with gabapentin. States that she could not feel any relief with methocarbamol.     Pt states that she has been \"fudging\" with the baclofen due to the increased pain associated with all that she has needed to do for the holiday and has been taking it 3 times/day. Denies side effects with increased dose. Pt is hoping that Dr. Garcia would change the directions so she could take it 3 times daily.     Pt has been out of baclofen for 2 days and, with current directions, pharmacy will not fill before 12/27.   If the prescription is changed, pt believes she could get it sooner.     Let her know that I would check with Dr. Garcia re: increased dose of baclofen 10 mg tabs and will get back to her. Prescription has NOT been updated with new directions or quantity.     EMILIA RocaN, RN-BC  Patient Care Supervisor  [facility]    "

## 2020-12-23 NOTE — TELEPHONE ENCOUNTER
Patient given message below from Dr. Garcia. Told patient that the pain clinic nurse will be following up with her to discuss robaxin versus methocarbamol. Informed that patient that she should not be taking both. Mya Lyle RN

## 2020-12-24 DIAGNOSIS — I50.32 CHRONIC DIASTOLIC CONGESTIVE HEART FAILURE (H): ICD-10-CM

## 2020-12-24 NOTE — TELEPHONE ENCOUNTER
Spoke to patint and notified that prescription for Baclofen 10 mg TID was signed and sent to [facility]/pharmacy #[id number] - [facility]   Fill and start 12/23/2020    Emy Sultana MA

## 2020-12-24 NOTE — LETTER
[address] [address]  [phone]       January 6, 2021    Kavitha Headley  [address] [place] [id number]    Kavitha:    We have been calling you regarding a recent refill request we received for Lasix.  Unfortunately, we were unable to reach you.  We are notifying you that you are due for a fasting physical and a cardiology follow up  prior to your next refill.  You can schedule this appointment via [employer] or by calling the clinic at [phone].        Sincerely,    Nayeli Castorena PA-C / nitin

## 2020-12-26 DIAGNOSIS — G89.4 CHRONIC PAIN SYNDROME: ICD-10-CM

## 2020-12-26 RX ORDER — BACLOFEN 10 MG/1
TABLET ORAL
Qty: 60 TABLET | Refills: 0 | OUTPATIENT
Start: 2020-12-26

## 2020-12-27 RX ORDER — FUROSEMIDE 20 MG
TABLET ORAL
Qty: 90 TABLET | Refills: 0 | Status: SHIPPED | OUTPATIENT
Start: 2020-12-27 | End: 2021-03-03

## 2020-12-28 DIAGNOSIS — G89.4 CHRONIC PAIN SYNDROME: ICD-10-CM

## 2020-12-28 NOTE — TELEPHONE ENCOUNTER
gabapentin (NEURONTIN) 300 MG capsule          Last Written Prescription Date:  9-24]\20  Last Fill Quantity: 270 capsule,  # refills: 0   Last office visit: 9/25/2020 with prescribing provider:  Rachel Hernández MD       Future Office Visit:        Routing refill request to provider for review/approval because:  Drug not on the [facility], P or [facility] refill protocol or controlled substance

## 2020-12-28 NOTE — TELEPHONE ENCOUNTER
Pain management being managed by Dr. Garcia, will defer approval to her.      Nayeli Castorena MBA, MS, PA-C

## 2020-12-28 NOTE — TELEPHONE ENCOUNTER
90 days sent to pharmacy.      Fasting physical for further fills.  No showed px in September.      Also, overdue for follow up with Dr. Ugarte in cardiology with echocardiogram.  This MUST be completed for further refills as well.      Nayeli Castorena MBA, MS, PA-C

## 2020-12-29 RX ORDER — GABAPENTIN 300 MG/1
300 CAPSULE ORAL 3 TIMES DAILY
Qty: 270 CAPSULE | Refills: 3 | Status: SHIPPED | OUTPATIENT
Start: 2020-12-29 | End: 2021-03-03

## 2020-12-29 NOTE — TELEPHONE ENCOUNTER
Gabapentin refill approved. Please let patient know to contact the pain clinic at [phone] for future refills of her pain medications.    Ketan Garcia DO  [facility]

## 2020-12-29 NOTE — TELEPHONE ENCOUNTER
Called and left non-detailed voicemail for patient. Patient is due for fasting physical for further refills.  No showed px in September per Nayeli Castorena.        Also, overdue for follow up with Dr. Ugarte in cardiology with echocardiogram.  This MUST be completed for further refills as well per Nayeli Castorena.    Aidan MIRANDA

## 2021-01-06 NOTE — TELEPHONE ENCOUNTER
Mail box is full cannot leave a message     I have been unable to reach this patient by phone.  A letter is being sent to the last known home address.      Essence Elizondo

## 2021-01-26 DIAGNOSIS — F32.0 MILD MAJOR DEPRESSION (H): ICD-10-CM

## 2021-01-26 DIAGNOSIS — F41.9 ANXIETY AND DEPRESSION: ICD-10-CM

## 2021-01-26 DIAGNOSIS — F32.A ANXIETY AND DEPRESSION: ICD-10-CM

## 2021-01-27 ENCOUNTER — TELEPHONE (OUTPATIENT)
Dept: FAMILY MEDICINE | Facility: CLINIC | Age: 76
End: 2021-01-27

## 2021-01-27 DIAGNOSIS — F41.9 ANXIETY AND DEPRESSION: ICD-10-CM

## 2021-01-27 DIAGNOSIS — F32.A ANXIETY AND DEPRESSION: ICD-10-CM

## 2021-01-29 RX ORDER — HYDROXYZINE HYDROCHLORIDE 25 MG/1
25 TABLET, FILM COATED ORAL EVERY 6 HOURS
Qty: 90 TABLET | Refills: 1 | Status: SHIPPED | OUTPATIENT
Start: 2021-01-29 | End: 2021-01-29

## 2021-01-29 RX ORDER — HYDROXYZINE HYDROCHLORIDE 25 MG/1
25 TABLET, FILM COATED ORAL EVERY 6 HOURS
Qty: 90 TABLET | Refills: 0 | Status: SHIPPED | OUTPATIENT
Start: 2021-01-29 | End: 2021-02-03

## 2021-01-29 NOTE — TELEPHONE ENCOUNTER
#90 x 3 refills sent 10/23/20  PHARMACY CHANGE    Refilled per RN Protocol.       Sara Williamson RN  [facility]

## 2021-01-29 NOTE — TELEPHONE ENCOUNTER
LOV: 9/25/2020  Patient due for physical/labs  No future appt scheduled    Routing to [facility] to assist in scheduling      Sara Williamson RN  [facility]

## 2021-01-30 NOTE — TELEPHONE ENCOUNTER
Pt overdue for annual visit.  Changed RX to 0 refills.    Please schedule for fasting PX      Nayeli Castorena MBA, MS, PA-C

## 2021-02-02 DIAGNOSIS — F32.A ANXIETY AND DEPRESSION: ICD-10-CM

## 2021-02-02 DIAGNOSIS — F41.9 ANXIETY AND DEPRESSION: ICD-10-CM

## 2021-02-03 RX ORDER — HYDROXYZINE HYDROCHLORIDE 25 MG/1
TABLET, FILM COATED ORAL
Qty: 90 TABLET | Refills: 0 | Status: SHIPPED | OUTPATIENT
Start: 2021-02-03 | End: 2021-03-03

## 2021-02-03 NOTE — TELEPHONE ENCOUNTER
#90 x 0 refills sent 1/29/21  PHARMACY CHANGE  Refilled per RN Protocol.       Sara Williamson RN  [facility]

## 2021-02-08 ENCOUNTER — TELEPHONE (OUTPATIENT)
Dept: FAMILY MEDICINE | Facility: CLINIC | Age: 76
End: 2021-02-08

## 2021-02-08 NOTE — TELEPHONE ENCOUNTER
Reason for Call:  Form, our goal is to have forms completed with 72 hours, however, some forms may require a visit or additional information.    Type of letter, form or note:  medical    Who is the form from?: [employer] (if other please explain)    Where did the form come from: form was faxed in    What clinic location was the form placed at?: [place]    Where the form was placed: Nayeli Castorena Box/Folder    What number is listed as a contact on the form?: [phone]       Additional comments:     Call taken on 2/8/2021 at 7:42 AM by Merlene Mcmahon

## 2021-02-08 NOTE — TELEPHONE ENCOUNTER
Prior Authorization Retail Medication Request    Medication/Dose:Hydroxyzine Hcl  ICD code (if different than what is on RX):    Previously Tried and Failed:    Rationale:      Insurance Name:  [employer]  Insurance ID:  [employer] Id [id number]      Pharmacy Information (if different than what is on RX)  Name:  [facility]   Phone:  [phone]    Please advise if continue with PA or change medication         Essence Elizondo

## 2021-02-10 NOTE — TELEPHONE ENCOUNTER
Prior Authorization Approval    Authorization Effective Date: 1/11/2021  Authorization Expiration Date: 2/10/2022  Medication: hydroxyzine-APPROVED  Approved Dose/Quantity:   Reference #:     Insurance Company: [employer] - Phone [phone] Fax [phone]  Expected CoPay:       CoPay Card Available:      Foundation Assistance Needed:    Which Pharmacy is filling the prescription (Not needed for infusion/clinic administered): [id number] - [place], [place] - [address] [facility]. AT [place] [address]  Pharmacy Notified: Yes  Patient Notified: No    Pharmacy will notify patient when medication is ready.

## 2021-02-10 NOTE — TELEPHONE ENCOUNTER
Central Prior Authorization Team   Phone: [phone]      PA Initiation    Medication: hydroxyzine-APPROVED  Insurance Company: [employer] - Phone [phone] Fax [phone]  Pharmacy Filling the Rx: [id number] - [place] - [address] AT [place] [address]  Filling Pharmacy Phone: [phone]  Filling Pharmacy Fax:    Start Date: 2/10/2021

## 2021-02-11 RX ORDER — ATENOLOL 25 MG/1
TABLET ORAL
Qty: 90 TABLET | Refills: 0 | Status: SHIPPED | OUTPATIENT
Start: 2021-02-11 | End: 2021-03-03

## 2021-02-11 RX ORDER — CITALOPRAM HYDROBROMIDE 20 MG/1
TABLET ORAL
Qty: 90 TABLET | Refills: 0 | Status: SHIPPED | OUTPATIENT
Start: 2021-02-11 | End: 2021-03-03

## 2021-02-11 NOTE — TELEPHONE ENCOUNTER
Next 5 appointments (look out 90 days)    Mar 03, 2021  3:00 PM  PHYSICAL with Nayeli Castorena PA-C  [facility] ([facility] ) [address] [place] [id number] [phone]        Medication is being filled for 1 time refill only due to:  Patient needs to be seen because due for OV.    Sara Williamson RN  [facility]

## 2021-02-12 ENCOUNTER — PRE VISIT (OUTPATIENT)
Dept: CARDIOLOGY | Facility: CLINIC | Age: 76
End: 2021-02-12

## 2021-02-12 DIAGNOSIS — I35.0 AORTIC STENOSIS: Primary | ICD-10-CM

## 2021-02-15 DIAGNOSIS — B00.1 COLD SORE: Primary | ICD-10-CM

## 2021-02-16 NOTE — TELEPHONE ENCOUNTER
Rx not active on med list - need to verify  Please call    Sara Williamson RN  [facility]

## 2021-02-18 DIAGNOSIS — F34.1 DYSTHYMIA: ICD-10-CM

## 2021-02-18 RX ORDER — VALACYCLOVIR HYDROCHLORIDE 1 G/1
TABLET, FILM COATED ORAL
Qty: 4 TABLET | Refills: 0 | Status: SHIPPED | OUTPATIENT
Start: 2021-02-18 | End: 2021-03-03

## 2021-02-18 NOTE — PLAN OF CARE
AOx4. VSS - weaned off O2, BPs can be soft. LS crackles. Cardiac diet. Assist of SB - WBAT (?) on R foot. Tele: NSR. R PIV SL. C/o R foot pain - PRN dilaudid PO. R foot reconstruction surgery 1/31 - pins in place/soft cast. Discharge pending possible 2/13 or 2/14.     Suicidal ideation with plan/means/Unable to care for self

## 2021-02-18 NOTE — TELEPHONE ENCOUNTER
Attempt #2  Called patient @ # below - Left a non-detailed message to call back and speak with any triage nurse.    Routing refill request to provider for review/approval because:  Drug not active on patient's medication list        Sara Williamson RN  [facility]

## 2021-02-18 NOTE — TELEPHONE ENCOUNTER
Last seen: 11/11/20  RTC: 4 weeks  Cancel: none  No-show: 12/9/20  Next appt: none     Incoming refill from pharmacy via fax     Medication requested: mirtazapine (REMERON) 15 MG tablet  Directions: Take 1 tablet (15 mg) by mouth At Bedtime   Qty: 90  Last refilled: 11/23/20 per pharmacy     Routed to provider for approval due to no show

## 2021-02-19 RX ORDER — MIRTAZAPINE 15 MG/1
15 TABLET, FILM COATED ORAL AT BEDTIME
Qty: 90 TABLET | Refills: 0 | Status: SHIPPED | OUTPATIENT
Start: 2021-02-19 | End: 2021-03-03

## 2021-02-26 NOTE — TELEPHONE ENCOUNTER
Patient has canceled two echo appointments. Called to verify that she wants to reschedule before seeing Dr. Ugarte on 3/5/2021. Patient states she is available next week.    Message to scheduling to contact her for a new appointment.

## 2021-03-03 ENCOUNTER — OFFICE VISIT (OUTPATIENT)
Dept: FAMILY MEDICINE | Facility: CLINIC | Age: 76
End: 2021-03-03
Payer: COMMERCIAL

## 2021-03-03 VITALS
WEIGHT: 169 LBS | HEART RATE: 64 BPM | HEIGHT: 61 IN | SYSTOLIC BLOOD PRESSURE: 128 MMHG | TEMPERATURE: 98.6 F | OXYGEN SATURATION: 94 % | DIASTOLIC BLOOD PRESSURE: 72 MMHG | BODY MASS INDEX: 31.91 KG/M2

## 2021-03-03 DIAGNOSIS — E53.8 VITAMIN B12 DEFICIENCY (NON ANEMIC): ICD-10-CM

## 2021-03-03 DIAGNOSIS — B00.1 COLD SORE: ICD-10-CM

## 2021-03-03 DIAGNOSIS — Z98.84 BARIATRIC SURGERY STATUS: ICD-10-CM

## 2021-03-03 DIAGNOSIS — K70.9 LIVER DISEASE, CHRONIC, DUE TO ALCOHOL (H): ICD-10-CM

## 2021-03-03 DIAGNOSIS — K21.00 GASTROESOPHAGEAL REFLUX DISEASE WITH ESOPHAGITIS WITHOUT HEMORRHAGE: ICD-10-CM

## 2021-03-03 DIAGNOSIS — G89.4 CHRONIC PAIN SYNDROME: ICD-10-CM

## 2021-03-03 DIAGNOSIS — F32.A ANXIETY AND DEPRESSION: ICD-10-CM

## 2021-03-03 DIAGNOSIS — Z00.00 ENCOUNTER FOR MEDICARE ANNUAL WELLNESS EXAM: Primary | ICD-10-CM

## 2021-03-03 DIAGNOSIS — F34.1 DYSTHYMIA: ICD-10-CM

## 2021-03-03 DIAGNOSIS — I50.32 CHRONIC DIASTOLIC CONGESTIVE HEART FAILURE (H): ICD-10-CM

## 2021-03-03 DIAGNOSIS — I10 HTN, GOAL BELOW 140/90: ICD-10-CM

## 2021-03-03 DIAGNOSIS — I77.810 MILD ASCENDING AORTA DILATATION (H): ICD-10-CM

## 2021-03-03 DIAGNOSIS — I35.0 NONRHEUMATIC AORTIC VALVE STENOSIS: ICD-10-CM

## 2021-03-03 DIAGNOSIS — I25.10 CORONARY ARTERY DISEASE INVOLVING NATIVE CORONARY ARTERY OF NATIVE HEART WITHOUT ANGINA PECTORIS: ICD-10-CM

## 2021-03-03 DIAGNOSIS — Z13.29 SCREENING FOR THYROID DISORDER: ICD-10-CM

## 2021-03-03 DIAGNOSIS — K90.89 OTHER INTESTINAL MALABSORPTION: ICD-10-CM

## 2021-03-03 DIAGNOSIS — F10.20 ALCOHOL USE DISORDER, SEVERE, DEPENDENCE (H): ICD-10-CM

## 2021-03-03 DIAGNOSIS — F41.9 ANXIETY: ICD-10-CM

## 2021-03-03 DIAGNOSIS — D64.9 ANEMIA, UNSPECIFIED TYPE: ICD-10-CM

## 2021-03-03 DIAGNOSIS — E78.2 MIXED HYPERLIPIDEMIA: ICD-10-CM

## 2021-03-03 DIAGNOSIS — F41.9 ANXIETY AND DEPRESSION: ICD-10-CM

## 2021-03-03 DIAGNOSIS — E51.9 THIAMINE DEFICIENCY: ICD-10-CM

## 2021-03-03 DIAGNOSIS — F51.04 CHRONIC INSOMNIA: ICD-10-CM

## 2021-03-03 DIAGNOSIS — F32.0 MILD MAJOR DEPRESSION (H): ICD-10-CM

## 2021-03-03 LAB
ERYTHROCYTE [DISTWIDTH] IN BLOOD BY AUTOMATED COUNT: 14.3 % (ref 10–15)
HCT VFR BLD AUTO: 34.7 % (ref 35–47)
HGB BLD-MCNC: 11.4 G/DL (ref 11.7–15.7)
MCH RBC QN AUTO: 29.9 PG (ref 26.5–33)
MCHC RBC AUTO-ENTMCNC: 32.9 G/DL (ref 31.5–36.5)
MCV RBC AUTO: 91 FL (ref 78–100)
PLATELET # BLD AUTO: 395 10E9/L (ref 150–450)
RBC # BLD AUTO: 3.81 10E12/L (ref 3.8–5.2)
WBC # BLD AUTO: 8.2 10E9/L (ref 4–11)

## 2021-03-03 PROCEDURE — 80061 LIPID PANEL: CPT | Performed by: PHYSICIAN ASSISTANT

## 2021-03-03 PROCEDURE — 99215 OFFICE O/P EST HI 40 MIN: CPT | Mod: 25 | Performed by: PHYSICIAN ASSISTANT

## 2021-03-03 PROCEDURE — 84443 ASSAY THYROID STIM HORMONE: CPT | Performed by: PHYSICIAN ASSISTANT

## 2021-03-03 PROCEDURE — 85027 COMPLETE CBC AUTOMATED: CPT | Performed by: PHYSICIAN ASSISTANT

## 2021-03-03 PROCEDURE — 80053 COMPREHEN METABOLIC PANEL: CPT | Performed by: PHYSICIAN ASSISTANT

## 2021-03-03 PROCEDURE — 82607 VITAMIN B-12: CPT | Performed by: PHYSICIAN ASSISTANT

## 2021-03-03 PROCEDURE — 82746 ASSAY OF FOLIC ACID SERUM: CPT | Performed by: PHYSICIAN ASSISTANT

## 2021-03-03 PROCEDURE — 82728 ASSAY OF FERRITIN: CPT | Performed by: PHYSICIAN ASSISTANT

## 2021-03-03 PROCEDURE — 99397 PER PM REEVAL EST PAT 65+ YR: CPT | Performed by: PHYSICIAN ASSISTANT

## 2021-03-03 PROCEDURE — 82043 UR ALBUMIN QUANTITATIVE: CPT | Performed by: PHYSICIAN ASSISTANT

## 2021-03-03 PROCEDURE — 36415 COLL VENOUS BLD VENIPUNCTURE: CPT | Performed by: PHYSICIAN ASSISTANT

## 2021-03-03 RX ORDER — GABAPENTIN 300 MG/1
300 CAPSULE ORAL 3 TIMES DAILY
Qty: 270 CAPSULE | Refills: 1 | Status: SHIPPED | OUTPATIENT
Start: 2021-03-03 | End: 2022-02-08

## 2021-03-03 RX ORDER — FUROSEMIDE 20 MG
TABLET ORAL
Qty: 90 TABLET | Refills: 1 | Status: SHIPPED | OUTPATIENT
Start: 2021-03-03 | End: 2021-03-16

## 2021-03-03 RX ORDER — BUPROPION HYDROCHLORIDE 150 MG/1
TABLET ORAL
COMMUNITY
Start: 2021-01-18 | End: 2021-03-03

## 2021-03-03 RX ORDER — MIRTAZAPINE 15 MG/1
15 TABLET, FILM COATED ORAL AT BEDTIME
Qty: 90 TABLET | Refills: 1 | Status: SHIPPED | OUTPATIENT
Start: 2021-03-03 | End: 2021-06-29

## 2021-03-03 RX ORDER — BACLOFEN 10 MG/1
10 TABLET ORAL 3 TIMES DAILY
Qty: 90 TABLET | Refills: 1 | Status: SHIPPED | OUTPATIENT
Start: 2021-03-03 | End: 2021-03-03

## 2021-03-03 RX ORDER — VALACYCLOVIR HYDROCHLORIDE 1 G/1
TABLET, FILM COATED ORAL
Qty: 4 TABLET | Refills: 3 | Status: SHIPPED | OUTPATIENT
Start: 2021-03-03 | End: 2021-05-12

## 2021-03-03 RX ORDER — TRAZODONE HYDROCHLORIDE 100 MG/1
100 TABLET ORAL AT BEDTIME
Qty: 90 TABLET | Refills: 1 | Status: SHIPPED | OUTPATIENT
Start: 2021-03-03 | End: 2021-08-02

## 2021-03-03 RX ORDER — BACLOFEN 10 MG/1
10 TABLET ORAL 3 TIMES DAILY
Qty: 90 TABLET | Refills: 1 | Status: ON HOLD | OUTPATIENT
Start: 2021-03-03 | End: 2021-03-29

## 2021-03-03 RX ORDER — NALTREXONE HYDROCHLORIDE 50 MG/1
50 TABLET, FILM COATED ORAL DAILY
Qty: 30 TABLET | Refills: 2 | Status: SHIPPED | OUTPATIENT
Start: 2021-03-03 | End: 2021-03-03

## 2021-03-03 RX ORDER — BUPROPION HYDROCHLORIDE 150 MG/1
150 TABLET ORAL EVERY MORNING
Qty: 90 TABLET | Refills: 1 | Status: ON HOLD | OUTPATIENT
Start: 2021-03-03 | End: 2021-03-29

## 2021-03-03 RX ORDER — ATENOLOL 25 MG/1
25 TABLET ORAL DAILY
Qty: 90 TABLET | Refills: 1 | Status: SHIPPED | OUTPATIENT
Start: 2021-03-03 | End: 2021-03-16

## 2021-03-03 RX ORDER — HYDROXYZINE HYDROCHLORIDE 25 MG/1
25 TABLET, FILM COATED ORAL EVERY 6 HOURS
Qty: 90 TABLET | Refills: 1 | Status: SHIPPED | OUTPATIENT
Start: 2021-03-03 | End: 2021-05-12

## 2021-03-03 RX ORDER — CITALOPRAM HYDROBROMIDE 20 MG/1
20 TABLET ORAL DAILY
Qty: 90 TABLET | Refills: 1 | Status: SHIPPED | OUTPATIENT
Start: 2021-03-03 | End: 2021-05-26

## 2021-03-03 ASSESSMENT — ANXIETY QUESTIONNAIRES
3. WORRYING TOO MUCH ABOUT DIFFERENT THINGS: SEVERAL DAYS
1. FEELING NERVOUS, ANXIOUS, OR ON EDGE: SEVERAL DAYS
IF YOU CHECKED OFF ANY PROBLEMS ON THIS QUESTIONNAIRE, HOW DIFFICULT HAVE THESE PROBLEMS MADE IT FOR YOU TO DO YOUR WORK, TAKE CARE OF THINGS AT HOME, OR GET ALONG WITH OTHER PEOPLE: SOMEWHAT DIFFICULT
2. NOT BEING ABLE TO STOP OR CONTROL WORRYING: NOT AT ALL
5. BEING SO RESTLESS THAT IT IS HARD TO SIT STILL: NOT AT ALL
6. BECOMING EASILY ANNOYED OR IRRITABLE: NOT AT ALL
GAD7 TOTAL SCORE: 2
7. FEELING AFRAID AS IF SOMETHING AWFUL MIGHT HAPPEN: NOT AT ALL

## 2021-03-03 ASSESSMENT — PATIENT HEALTH QUESTIONNAIRE - PHQ9
SUM OF ALL RESPONSES TO PHQ QUESTIONS 1-9: 8
5. POOR APPETITE OR OVEREATING: NOT AT ALL

## 2021-03-03 ASSESSMENT — ACTIVITIES OF DAILY LIVING (ADL): CURRENT_FUNCTION: NO ASSISTANCE NEEDED

## 2021-03-03 ASSESSMENT — MIFFLIN-ST. JEOR: SCORE: 1181.02

## 2021-03-03 NOTE — LETTER
[facility]  [address] [place] [id number]  [phone]                    March 9, 2021    Kavitha Headley  [address] [facility] [id number]      Dear Kavitha,    Here is a summary of your recent test results:    -Hemoglobin is decreased indicating anemia.  This is likely due to your kidney disease and/or alcohol use.  It is stable when compared to 5 months ago.  We will continue to monitor.   -White blood cell and platelet counts are normal.   -LDL(bad) cholesterol level is elevated which can increase your heart disease risk.  A diet high in fat and simple carbohydrates, genetics and being overweight can contribute to this. ADVISE: exercising 150 minutes of aerobic exercise per week (30 minutes for 5 days per week or 50 minutes for 3 days per week are options) and eating a low saturated fat/low carbohydrate diet are helpful to improve this. In 12 months, you should recheck your fasting cholesterol panel by scheduling a lab-only appointment.   -Liver and gallbladder tests (ALT,AST, Alk phos,bilirubin) are normal.   -Kidney function (GFR) is decreased but stable.  ADVISE: avoid NSAID medications and stay well hydrated (water/electrolyte drinks).   -Sodium is decreased.  ADVISE: This is likely due to your alcohol consumption.  Please work on discontinuation and start to work on a treatment program.  This should be rechecked within the next 3 months.   -Potassium is normal.   -Calcium is normal.   -Glucose (diabetic screening test) is normal.   -TSH (thyroid stimulating hormone) level is normal which indicates normal thyroid function.   -Low iron store levels (ferritin).  ADVISE: increasing iron in your diet and consider taking iron supplement for 2 months (ferrous gluconate 325 mg twice daily - to avoid constipation from the supplement you should increase fluid intake and fiber in your diet)  Also, recheck your labs in 2 months.   -Microalbumin (urine protein)  level is elevated. This is suggestive of early damage to your kidneys from high blood pressure.  ADVISE: avoiding anti-inflamatory agents such as ibuprofen (Advil, Motrin) or naproxen (Aleve) as much as possible, keeping your blood pressure in a normal range, and continuing your medication (lisinopril) that helps protect your kidneys.  Also, this should be rechecked in 1 year.   -Folate and B12 are normal.   For additional lab test information, labtestsonline.org is an excellent reference.     Your test results are enclosed.      Please contact me if you have any questions.    In addition, here is a list of due or overdue Health Maintenance reminders.    Health Maintenance Due   Topic Date Due     Heart Failure Action Plan  Never done     COVID-19 Vaccine (1 of 2) Never done     Zoster (Shingles) Vaccine (1 of 2) Never done     URINE DRUG SCREEN  06/27/2020       Please call us at [phone] (or use [employer]) to address the above recommendations.            Thank you very much for trusting [facility].     Healthy regards,      Nayeli Castorena PA-C         Results for orders placed or performed in visit on 03/03/21   Albumin Random Urine Quantitative with Creat Ratio     Status: Abnormal   Result Value Ref Range    Creatinine Urine 158 mg/dL    Albumin Urine mg/L 42 mg/L    Albumin Urine mg/g Cr 26.52 (H) 0 - 25 mg/g Cr   Lipid panel reflex to direct LDL Fasting     Status: Abnormal   Result Value Ref Range    Cholesterol 300 (H) <200 mg/dL    Triglycerides 77 <150 mg/dL    HDL Cholesterol 140 >49 mg/dL    LDL Cholesterol Calculated 145 (H) <100 mg/dL    Non HDL Cholesterol 160 (H) <130 mg/dL   Comprehensive metabolic panel (BMP + Alb, Alk Phos, ALT, AST, Total. Bili, TP)     Status: Abnormal   Result Value Ref Range    Sodium 132 (L) 133 - 144 mmol/L    Potassium 3.8 3.4 - 5.3 mmol/L    Chloride 97 94 - 109 mmol/L    Carbon Dioxide 28 20 - 32 mmol/L    Anion Gap 7 3 - 14 mmol/L    Glucose  94 70 - 99 mg/dL    Urea Nitrogen 10 7 - 30 mg/dL    Creatinine 0.98 0.52 - 1.04 mg/dL    GFR Estimate 55 (L) >60 mL/min/[1.73_m2]    GFR Estimate If Black 64 >60 mL/min/[1.73_m2]    Calcium 10.0 8.5 - 10.1 mg/dL    Bilirubin Total 0.5 0.2 - 1.3 mg/dL    Albumin 3.7 3.4 - 5.0 g/dL    Protein Total 7.9 6.8 - 8.8 g/dL    Alkaline Phosphatase 75 40 - 150 U/L    ALT 15 0 - 50 U/L    AST 20 0 - 45 U/L   CBC with platelets     Status: Abnormal   Result Value Ref Range    WBC 8.2 4.0 - 11.0 10e9/L    RBC Count 3.81 3.8 - 5.2 10e12/L    Hemoglobin 11.4 (L) 11.7 - 15.7 g/dL    Hematocrit 34.7 (L) 35.0 - 47.0 %    MCV 91 78 - 100 fl    MCH 29.9 26.5 - 33.0 pg    MCHC 32.9 31.5 - 36.5 g/dL    RDW 14.3 10.0 - 15.0 %    Platelet Count 395 150 - 450 10e9/L   Ferritin     Status: None   Result Value Ref Range    Ferritin 16 8 - 252 ng/mL   Vitamin B12     Status: None   Result Value Ref Range    Vitamin B12 645 193 - 986 pg/mL   Folate     Status: None   Result Value Ref Range    Folate 66.6 >5.4 ng/mL   TSH with free T4 reflex     Status: None   Result Value Ref Range    TSH 1.05 0.40 - 4.00 mU/L

## 2021-03-03 NOTE — PATIENT INSTRUCTIONS
Patient Education   Personalized Prevention Plan  You are due for the preventive services outlined below.  Your care team is available to assist you in scheduling these services.  If you have already completed any of these items, please share that information with your care team to update in your medical record.  Health Maintenance Due   Topic Date Due     Heart Failure Action Plan  1945     ANNUAL REVIEW OF HM ORDERS  1945     COVID-19 Vaccine (1 of 2) 10/16/1959     Zoster (Shingles) Vaccine (1 of 2) 10/16/1993     Kidney Microalbumin Urine Test  06/26/2020     URINE DRUG SCREEN  06/27/2020     FALL RISK ASSESSMENT  12/04/2020     Cholesterol Lab  02/12/2021

## 2021-03-03 NOTE — PROGRESS NOTES
"SUBJECTIVE:   Kavitha Headley is a 75 year old female who presents for Preventive Visit.      Patient has been advised of split billing requirements and indicates understanding: Yes   Are you in the first 12 months of your Medicare coverage?  No    Healthy Habits:    In general, how would you rate your overall health?  Good    Frequency of exercise:: Gym is just starting to reopen.    Do you usually eat at least 4 servings of fruit and vegetables a day, include whole grains    & fiber and avoid regularly eating high fat or \"junk\" foods?  Yes    Taking medications regularly:  Yes    Barriers to taking medications:  None    Medication side effects:  None    Ability to successfully perform activities of daily living:  No assistance needed    Home Safety:  No safety concerns identified    Hearing Impairment:  No hearing concerns    In the past 6 months, have you been bothered by leaking of urine? Yes (Little at night)    Self-rated mental health: Good-fair.      PHQ-2 Total Score:    Additional concerns today:  No    Do you feel safe in your environment? Yes    Have you ever done Advance Care Planning? (For example, a Health Directive, POLST, or a discussion with a medical provider or your loved ones about your wishes): No, advance care planning information given to patient to review.  Advanced care planning was discussed at today's visit.       Fall risk  Fallen 2 or more times in the past year?: Yes  Any fall with injury in the past year?: No    Cognitive Screening Pt declined    Do you have sleep apnea, excessive snoring or daytime drowsiness?: no    Reviewed and updated as needed this visit by clinical staff  Tobacco  Allergies  Meds  Problems  Med Hx  Surg Hx  Fam Hx  Soc Hx          Reviewed and updated as needed this visit by Provider  Tobacco  Allergies  Meds  Problems  Med Hx  Surg Hx  Fam Hx         Social History     Tobacco Use     Smoking status: Never Smoker     Smokeless tobacco: Never Used " "  Substance Use Topics     Alcohol use: Not Currently     Alcohol/week: 63.0 standard drinks     Types: 63 Standard drinks or equivalent per week     Comment: three \"3oz\" liquor drinks nightly/ sober since October 63  If you drink alcohol do you typically have >3 drinks per day or >7 drinks per week? Yes      Alcohol Use 3/3/2021   Prescreen: >3 drinks/day or >7 drinks/week? -   AUDIT SCORE  21     AUDIT - Alcohol Use Disorders Identification Test - Reproduced from the World Health Organization Audit 2001 (Second Edition) 3/3/2021   1.  How often do you have a drink containing alcohol? 4 or more times a week   2.  How many drinks containing alcohol do you have on a typical day when you are drinking? 5 or 6   3.  How often do you have five or more drinks on one occasion? Weekly   4.  How often during the last year have you found that you were not able to stop drinking once you had started? Never   5.  How often during the last year have you failed to do what was normally expected of you because of drinking? Monthly   6.  How often during the last year have you needed a first drink in the morning to get yourself going after a heavy drinking session? Monthly   7.  How often during the last year have you had a feeling of guilt or remorse after drinking? Daily or almost daily   8.  How often during the last year have you been unable to remember what happened the night before because of your drinking? Never   9.  Have you or someone else been injured because of your drinking? No   10. Has a relative, friend, doctor or other health care worker been concerned about your drinking or suggested you cut down? Yes, during the last year   TOTAL SCORE 21       Hypertension Follow-up  On atenolol 25 mg once daily and furosemide 20 mg once daily.    Do you check your blood pressure regularly outside of the clinic? No     Are you following a low salt diet? Yes    Are your blood pressures ever more than 140 on the top number " "(systolic) OR more   than 90 on the bottom number (diastolic), for example 140/90? Unsure    Depression and Anxiety Follow-Up  She is taking citalopram 20 mg every day, Wellbutrin 150 mg daily and takes hydroxyzine 25 mg 2-3 tablets BID.  Had established with psychiatry, Dr. Shoenecker but hasn't seen him since last summer.       History:  Brintellix 10 mg - not taken since 11/2018 - would like to try another med - this was very expensive. Still going through bankruptcy process.   Had a \"falling out\" with psychiatrist Dr. Vargas after her hospital stay in 2019. He had advised her to stay and complete her stay however she \"wanted out\". Left hospital AMA and has not followed back with him or any other psychiatrist since.   Has tried Zoloft from a friend script - experienced negative side effects   Lexapro 10 mg 2005/2006 - unsure of side effects  Wellbutrin 2004 - unsure of side effects   Paxil - short lived  Citalopram - 2007  Hydroxyzine - worked very well, would like this PRN.    How are you doing with your depression since your last visit? Up and down    How are you doing with your anxiety since your last visit?  Up and down    Are you having other symptoms that might be associated with depression or anxiety? Yes:  winter is very difficult - better with temps warming up and sun, does feel she has turned the corner    Have you had a significant life event? No     Do you have any concerns with your use of alcohol or other drugs? Yes:  alcohol    Aortic stenosis  History of moderate-severe CAD and moderate aortic valve stenosis with trace regurgitation and mild ascending aorta dilation.  Last virtual visit with PA of cardiology was on 3/17/2020.. Patient notes Dr. Ugarte was present at her 2/2020 [facility] Echocardiogram and they reported her aortic stenosis was stable and continued monitoring was recommended.  She is on furosemide 20 mg daily.  Is scheduled with Dr. Ugarte 3/16/2021 with echocardiogram 3/10/2021.  " Patient denies any chest pain, SOB, dizziness, syncope or new symptoms.     CAD   Patient has a longstanding history of moderate-severe CAD and moderate aortic valve stenosis with trace regurgitation and mild ascending aorta dilation. Patient denies recent chest pain or NTG use, denies exercise induced dyspnea or PND. Last TTE 2/11/2020 was stable, EKG on 2/12/2021 was normal.    Psoriasis  Patient is on Humira injection every 14 days. Reports discontinuation of this RX 9/2020.  Follows Dr. Gauri Clark dermatology but is overdue for followup.  Psoriasis has beeen flared somewhat since discontinuation of Humira.    Insomnia  Patient takes trazodone 100 mg nightly, mirtazapine 15 mg at bedtime in addition to her hydroxyzine 25 mg 2-3 tablets nightly. She reports she needs all of these to sleep well.  This is a taper from her previously prescribed regimen.    Esophagitis, Liver disease, chronic, due to alcohol (H)  Kavitha has historically seen Dr. Graham for esophagitis and had an endoscopy performed in 08/2017 that found Otis-en-y gastrojejunostomy with gastrojejunal anastomosis characterized by congestion, erosion and edema. She reports she has not followed up with him since.  MTM recommended not using carafate long term due to malabsorption of other substances with this medication. Denies nausea, vomiting or dark stools.    Severe Alcohol Abuse  Longstanding history.  Relapsed again in the fall of 2020.  Hospitalized for withdrawals 8/17/2020.  Currently admits to drinking 8 oz of demetrice daily.  Was planning to go into treatment in the fall of 2020 but reports that her Rule 25 assessment was never sent to [place] and thus, she was never admitted.  Multiple notes regarding faxing/confirmation of receipt.  Of note, Has tried a formal inpatient treatment program in 2017 at [facility] (? Need records)  but did not complete the full 28 days.   Had consultation with addiction medicine including therapist last  "year.  Reports wanting help and knows that she will likely not survive long-term if she continues to drink daily.  Her boyfriend does not drink and expresses concerns regarding her ETOH use.  Reports taking naltrexone 50 mg daily but that she doesn't think this helps her cravings.    Social History     Tobacco Use     Smoking status: Never Smoker     Smokeless tobacco: Never Used   Substance Use Topics     Alcohol use: Not Currently     Alcohol/week: 63.0 standard drinks     Types: 63 Standard drinks or equivalent per week     Comment: three \"3oz\" liquor drinks nightly/ sober since October     Drug use: No     PHQ 6/12/2020 8/24/2020 10/22/2020   PHQ-9 Total Score 4 14 6   Q9: Thoughts of better off dead/self-harm past 2 weeks Not at all Not at all Not at all     NITHIN-7 SCORE 6/12/2020 8/24/2020 10/22/2020   Total Score - - -   Total Score 3 10 3       Chronic/Recurring Back Pain Follow Up  Working with [facility].  Had facet injection on 2/18/2021.  Currently taking gabapentin and baclofen TID for pain and this works well.     Where is your back pain located? (Select all that apply) low back left and middle of back center    How would you describe your back pain?  dull ache, mid back-spasms    Where does your back pain spread? Info left hip    Since your last clinic visit for back pain, how has your pain changed? always present, but gets better and worse    Does your back pain interfere with your job? Not applicable    Since your last visit, have you tried any new treatment? Yes -  Injection in med back last week, lower in 12/2020, use hand help massager      Current providers sharing in care for this patient include:   Patient Care Team:  Nayeli Castorena PA-C as PCP - General (Physician Assistant)  Sheri Elizondo [facility] as Pharmacist (Pharmacist)  Ziyad Alejo PA-C as Assigned PCP    The following health maintenance items are reviewed in Epic and correct as of today:  Health Maintenance   Topic Date " Due     HF ACTION PLAN  1945     ANNUAL REVIEW OF HM ORDERS  1945     COVID-19 Vaccine (1 of 2) 10/16/1959     ZOSTER IMMUNIZATION (1 of 2) 10/16/1993     MICROALBUMIN  06/26/2020     URINE DRUG SCREEN  06/27/2020     FALL RISK ASSESSMENT  12/04/2020     LIPID  02/12/2021     PHQ-9  04/22/2021     ALT  09/25/2021     BMP  09/25/2021     CBC  09/25/2021     MEDICARE ANNUAL WELLNESS VISIT  03/03/2022     DTAP/TDAP/TD IMMUNIZATION (4 - Td) 07/10/2024     DEXA  07/15/2024     ADVANCE CARE PLANNING  03/03/2026     TSH W/FREE T4 REFLEX  Completed     HEPATITIS C SCREENING  Completed     DEPRESSION ACTION PLAN  Completed     INFLUENZA VACCINE  Completed     Pneumococcal Vaccine: Pediatrics (0 to 5 Years) and At-Risk Patients (6 to 64 Years)  Completed     Pneumococcal Vaccine: 65+ Years  Completed     IPV IMMUNIZATION  Aged Out     MENINGITIS IMMUNIZATION  Aged Out     HEPATITIS B IMMUNIZATION  Aged Out     BP Readings from Last 3 Encounters:   03/03/21 128/72   10/28/20 (!) 143/73   09/25/20 122/72    Wt Readings from Last 3 Encounters:   03/03/21 76.7 kg (169 lb)   10/22/20 70.3 kg (155 lb)   09/25/20 74.4 kg (164 lb)                  Patient Active Problem List   Diagnosis     Spinal stenosis     Chronic insomnia     CKD (chronic kidney disease) stage 3, GFR 30-59 ml/min     Hip joint replacement status     Knee joint replacement status     Chronic pain syndrome     Bariatric surgery status     Mixed hyperlipidemia     Personal history of urinary calculi     Macrocytic anemia     Anxiety     Aortic stenosis     Left-sided low back pain without sciatica     PAC (premature atrial contraction)     Controlled substance agreement signed     Seasonal affective disorder (H)     HTN, goal below 140/90     Hyponatremia     Vitamin B12 deficiency (non anemic)     Severe alcohol dependence (H)     Liver disease, chronic, due to alcohol (H)     Paroxysmal supraventricular tachycardia (H)     Coronary artery disease  involving native coronary artery of native heart without angina pectoris     Mild ascending aorta dilatation (H)     Psoriasis - on Humira - Dr. Gauri Clark with dermatology     Mild major depression (H)     Thiamine deficiency     Herpes simplex virus infection     Hallux rigidus of right foot     CHF (congestive heart failure) (H)     Gastroesophageal reflux disease with esophagitis - chronic due to alcohol     Past Surgical History:   Procedure Laterality Date     APPENDECTOMY  3/2004    incidental     ARTHRODESIS TOE(S) Right 1/31/2020    Procedure: RIGHT FIRST METATARSAL PHALANGEAL JOINT ARTHRODESIS;  Surgeon: Steven Reyes MD;  Location:  OR     C GASTRIC BYPASS,OBESE<100CM ARIANNA-EN-Y  1996     C MEDIASTINOSCOPY W OR WO BIOPSY  2/2008    Videomediastinoscopy and, for mediastinal adenopathy -reactive lymphoid hyperplasia     C REPAIR OF RECTOCELE  3/2012     C TOTAL KNEE ARTHROPLASTY  12/2005    left      CARPAL TUNNEL RELEASE RT/LT  10/2010    Carpometacarpal excisional arthroplasty with a fascial autograft and APL suspension sling (48062). 2. Left thumb metacarpophalangeal joint fusion with autologous bone graft (83241). 3. Left endoscopic carpal tunnel release      CHOLECYSTECTOMY, LAPOROSCOPIC  11/2010    Cholecystectomy, Laparoscopic     COLONOSCOPY N/A 9/8/2016    Procedure: COMBINED COLONOSCOPY, SINGLE OR MULTIPLE BIOPSY/POLYPECTOMY BY BIOPSY;  Surgeon: Moe Barlow MD;  Location:  GI     [facility]  [facility] enterocele repair, lysis of adhesions, placement of retropubic mid urethral sling, cystoscopy     CYSTOSCOPY, LITHOTRIPSY, COMBINED  6/2006    Left extracorporeal shock wave lithotripsy, cystoscopy, left ureteral stent placement.     CYSTOSCOPY, REMOVE STENT(S), COMBINED  7/2006    Cystoscopy, removal of left ureteral stent, retrograde pyelography, flexible and rigid ureteroscopy and holmium laser lithotripsy, basket removal of stone fragments,  "ureteral stent placement.      ENDOSCOPIC ULTRASOUND UPPER GASTROINTESTINAL TRACT (GI) N/A 6/12/2017    Procedure: ENDOSCOPIC ULTRASOUND, ESOPHAGOSCOPY / UPPER GASTROINTESTINAL TRACT (GI);  ENDOSCOPIC ULTRASOUND, ESOPHAGOSCOPY / UPPER GASTROINTESTINAL TRACT (GI);  Surgeon: Parth Graham MD;  Location:  GI     HERNIA REPAIR  4/2012    bilateral augmentation mastopexy, ventral hernia repair, and medial thigh liposuction on 04/06/2012.      HYSTERECTOMY VAGINAL, BILATERAL SALPINGO-OOPHERECTOMY, COMBINED  1998    due to myoma and bleeding     JOINT REPLACEMENT, HIP RT/LT  4/2004    right total hip arthroplasty     LAPAROTOMY, LYSIS ADHESIONS, COMBINED  3/2004    lysis adhesions, ventral hernia repair, appendectomy incidentally     LYMPH NODE BIOPSY  4/2008    right axillary, reactive follicular and paracortical hyperplasia.     MAMMOPLASTY AUGMENTATION BILATERAL  4/2012     REPAIR HAMMER TOE Right 1/31/2020    Procedure: WITH SECOND AND THIRD CLAW TOE RECONSTRUCTION;  Surgeon: Steven Reyes MD;  Location:  OR     REVISE RECONSTRUCTED BREAST  6/7/2012    Left breast capsulotomy.        Social History     Tobacco Use     Smoking status: Never Smoker     Smokeless tobacco: Never Used   Substance Use Topics     Alcohol use: Not Currently     Alcohol/week: 63.0 standard drinks     Types: 63 Standard drinks or equivalent per week     Comment: three \"3oz\" liquor drinks nightly/ sober since October     Family History   Problem Relation Age of Onset     Substance Abuse Father      Cancer Father         throat and lung mets     Diabetes No family hx of      Coronary Artery Disease No family hx of      Cerebrovascular Disease No family hx of          Current Outpatient Medications   Medication Sig Dispense Refill     aspirin 81 MG EC tablet Take 81 mg by mouth daily       aspirin-acetaminophen-caffeine (EXCEDRIN MIGRAINE) 250-250-65 MG tablet Take 2 tablets by mouth daily as needed for headaches       atenolol " "(TENORMIN) 25 MG tablet Take 1 tablet (25 mg) by mouth daily 90 tablet 1     baclofen (LIORESAL) 10 MG tablet Take 1 tablet (10 mg) by mouth 3 times daily 90 tablet 1     BIOTIN PO Take 1 tablet by mouth every morning       buPROPion (WELLBUTRIN XL) 150 MG 24 hr tablet Take 1 tablet (150 mg) by mouth every morning 90 tablet 1     citalopram (CELEXA) 20 MG tablet Take 1 tablet (20 mg) by mouth daily 90 tablet 1     folic acid (FOLVITE) 1 MG tablet Take 1 mg by mouth daily       furosemide (LASIX) 20 MG tablet TAKE 1 TABLET DAILY 90 tablet 1     gabapentin (NEURONTIN) 300 MG capsule Take 1 capsule (300 mg) by mouth 3 times daily 270 capsule 1     hydrOXYzine (ATARAX) 25 MG tablet Take 1 tablet (25 mg) by mouth every 6 hours 90 tablet 1     mirtazapine (REMERON) 15 MG tablet Take 1 tablet (15 mg) by mouth At Bedtime 90 tablet 1     Multiple Vitamins-Minerals (CENTRUM SILVER) per tablet Take 1 tablet by mouth daily       polyethylene glycol (MIRALAX/GLYCOLAX) Packet Take 17 g by mouth daily as needed (constipation) 7 packet      senna-docusate (SENOKOT-S;PERICOLACE) 8.6-50 MG per tablet Take 1-2 tablets by mouth 2 times daily as needed for constipation 100 tablet      traZODone (DESYREL) 100 MG tablet Take 1 tablet (100 mg) by mouth At Bedtime 90 tablet 1     valACYclovir (VALTREX) 1000 mg tablet TAKE 2 TABS BY MOUTH EVERY 12 HOURS FOR 1 DAY ONLY FOR OUTBREAKS OF COLD SORES AS NEEDED 4 tablet 3     vitamin C (ASCORBIC ACID) 250 MG tablet Take 250 mg by mouth daily       Mammogram Screening - Patient over age 75, has elected to discontinue screenings.    Review of Systems  Constitutional, HEENT, cardiovascular, pulmonary, GI, , musculoskeletal, neuro, skin, endocrine and psych systems are negative, except as otherwise noted.    OBJECTIVE:   /72 (BP Location: Left arm, Patient Position: Chair, Cuff Size: Adult Large)   Pulse 64   Temp 98.6  F (37  C) (Tympanic)   Ht 1.537 m (5' 0.5\")   Wt 76.7 kg (169 lb)   " "SpO2 94%   Breastfeeding No   BMI 32.46 kg/m   Estimated body mass index is 32.46 kg/m  as calculated from the following:    Height as of this encounter: 1.537 m (5' 0.5\").    Weight as of this encounter: 76.7 kg (169 lb).  Physical Exam  GENERAL APPEARANCE: healthy, alert and no distress  EYES: Eyes grossly normal to inspection, PERRL and conjunctivae and sclerae normal  HENT: ear canals and TM's normal, nose and mouth without ulcers or lesions, oropharynx clear and oral mucous membranes moist  NECK: no adenopathy, no asymmetry, masses, or scars and thyroid normal to palpation  RESP: lungs clear to auscultation - no rales, rhonchi or wheezes  CV: regular rate and rhythm, normal S1 S2, no S3 or S4, III/VI blowing systolic murmur, click or rub, no peripheral edema and peripheral pulses strong  ABDOMEN: soft, nontender, no hepatosplenomegaly, no masses and bowel sounds normal  MS: no musculoskeletal defects are noted and gait is age appropriate without ataxia  SKIN: no suspicious lesions or rashes  NEURO: Normal strength and tone, sensory exam grossly normal, mentation intact and speech normal  PSYCH: mentation appears normal and affect normal/bright    Diagnostic Test Results:  Results for orders placed or performed in visit on 03/03/21 (from the past 24 hour(s))   CBC with platelets   Result Value Ref Range    WBC 8.2 4.0 - 11.0 10e9/L    RBC Count 3.81 3.8 - 5.2 10e12/L    Hemoglobin 11.4 (L) 11.7 - 15.7 g/dL    Hematocrit 34.7 (L) 35.0 - 47.0 %    MCV 91 78 - 100 fl    MCH 29.9 26.5 - 33.0 pg    MCHC 32.9 31.5 - 36.5 g/dL    RDW 14.3 10.0 - 15.0 %    Platelet Count 395 150 - 450 10e9/L     *Note: Due to a large number of results and/or encounters for the requested time period, some results have not been displayed. A complete set of results can be found in Results Review.       ASSESSMENT / PLAN:   Kavitha was seen today for physical.    Diagnoses and all orders for this visit:    Encounter for Medicare annual " wellness exam  -     REVIEW OF HEALTH MAINTENANCE PROTOCOL ORDERS    Alcohol use disorder, severe, dependence (H)   Vitamin B12 deficiency (non anemic)   Thiamine deficiency   Liver disease, chronic, due to alcohol (H)   Poorly controlled.  Pt understands implications of continued ETOH use.  New referral for addiction medicine and CC referral placed to assist with continuity.  Stop naltrexone as this does not appear to be helping symptoms.  Labs for surveillance.  Encouraged tapering while awaiting formal assistance.  -     Comprehensive metabolic panel (BMP + Alb, Alk Phos, ALT, AST, Total. Bili, TP)  -     CARE COORDINATION REFERRAL  -     MENTAL HEALTH REFERRAL  - Adult; Addiction Medicine Provider; Addiction Medicine Evaluation & Treatment; Addiction Medicine Consultation, Evaluation & Treatment [phone]; Alcohol; Medication Assisted Treatment: Alcohol; We will contact you t...  -     Vitamin B12  -     Vitamin B1 whole blood    Gastroesophageal reflux disease with esophagitis - chronic due to alcohol  Pt denies current symptoms.  Will address after we work on sobriety.    Chronic diastolic congestive heart failure (H)  Mild ascending aorta dilatation (H)  Nonrheumatic aortic valve stenosis  Coronary artery disease involving native coronary artery of native heart without angina pectoris  HTN, goal below 140/90  Mixed hyperlipidemia  Follow up with echo and Dr. Ugarte as scheduled.  -     furosemide (LASIX) 20 MG tablet; TAKE 1 TABLET DAILY  -     Albumin Random Urine Quantitative with Creat Ratio  -     Lipid panel reflex to direct LDL Fasting    Mild major depression (H)  Anxiety and depression  Dysthymia  Anxiety  Poorly controlled due to ETOH.  Continue medications and will adjust one we address sobriety in more detail.  Pt desires no med changes at this time.  -     mirtazapine (REMERON) 15 MG tablet; Take 1 tablet (15 mg) by mouth At Bedtime  -     hydrOXYzine (ATARAX) 25 MG tablet; Take 1 tablet (25  mg) by mouth every 6 hours  -     citalopram (CELEXA) 20 MG tablet; Take 1 tablet (20 mg) by mouth daily  -     buPROPion (WELLBUTRIN XL) 150 MG 24 hr tablet; Take 1 tablet (150 mg) by mouth every morning  -     atenolol (TENORMIN) 25 MG tablet; Take 1 tablet (25 mg) by mouth daily    Chronic pain syndrome  Stable.  Continue current regimen.  -     gabapentin (NEURONTIN) 300 MG capsule; Take 1 capsule (300 mg) by mouth 3 times daily  -     baclofen (LIORESAL) 10 MG tablet; Take 1 tablet (10 mg) by mouth 3 times daily    Anemia, unspecified type  Unclear etiology.  Suspect ETOH playing a role.  Labs for surveillance.  -     CBC with platelets  -     Ferritin  -     Vitamin B12  -     Folate    Cold sore  Occasional outbreaks.  Refills for flares.  -     valACYclovir (VALTREX) 1000 mg tablet; TAKE 2 TABS BY MOUTH EVERY 12 HOURS FOR 1 DAY ONLY FOR OUTBREAKS OF COLD SORES AS NEEDED    Chronic insomnia  Stable.  Continue current regimen.  -     mirtazapine (REMERON) 15 MG tablet; Take 1 tablet (15 mg) by mouth At Bedtime  -     hydrOXYzine (ATARAX) 25 MG tablet; Take 1 tablet (25 mg) by mouth every 6 hours  -     traZODone (DESYREL) 100 MG tablet; Take 1 tablet (100 mg) by mouth At Bedtime    Screening for thyroid disorder  -     TSH with free T4 reflex    Bariatric surgery status  Other intestinal malabsorption   Labs for surveillance  -     Vitamin D Deficiency  -     Ferritin    Greater than 45 minutes were spent with the patient outside of preventative care. The majority of this time was coordinating care and counseling regarding the above diagnosis, charting and collaborating with specialists.      Patient has been advised of split billing requirements and indicates understanding: Yes  COUNSELING:  Reviewed preventive health counseling, as reflected in patient instructions       Regular exercise       Healthy diet/nutrition       Advanced Planning     Estimated body mass index is 32.46 kg/m  as calculated from the  "following:    Height as of this encounter: 1.537 m (5' 0.5\").    Weight as of this encounter: 76.7 kg (169 lb).    Weight management plan: Discussed healthy diet and exercise guidelines    She reports that she has never smoked. She has never used smokeless tobacco.      Appropriate preventive services were discussed with this patient, including applicable screening as appropriate for cardiovascular disease, diabetes, osteopenia/osteoporosis, and glaucoma.  As appropriate for age/gender, discussed screening for colorectal cancer, prostate cancer, breast cancer, and cervical cancer. Checklist reviewing preventive services available has been given to the patient.    Reviewed patients plan of care and provided an AVS. The Basic Care Plan (routine screening as documented in Health Maintenance) for Kavitha meets the Care Plan requirement. This Care Plan has been established and reviewed with the Patient.    Counseling Resources:  ATP IV Guidelines  Pooled Cohorts Equation Calculator  Breast Cancer Risk Calculator  Breast Cancer: Medication to Reduce Risk  FRAX Risk Assessment  ICSI Preventive Guidelines  Dietary Guidelines for Americans, 2010  USDA's MyPlate  ASA Prophylaxis  Lung CA Screening    CLEO Gomez [facility]    Identified Health Risks:  "

## 2021-03-04 ENCOUNTER — PATIENT OUTREACH (OUTPATIENT)
Dept: CARE COORDINATION | Facility: CLINIC | Age: 76
End: 2021-03-04

## 2021-03-04 ENCOUNTER — TELEPHONE (OUTPATIENT)
Dept: ADDICTION MEDICINE | Facility: CLINIC | Age: 76
End: 2021-03-04

## 2021-03-04 PROBLEM — F41.9 ANXIETY AND DEPRESSION: Status: ACTIVE | Noted: 2018-08-03

## 2021-03-04 PROBLEM — F32.A ANXIETY AND DEPRESSION: Status: ACTIVE | Noted: 2018-08-03

## 2021-03-04 PROBLEM — B00.1 COLD SORE: Status: ACTIVE | Noted: 2021-03-04

## 2021-03-04 PROBLEM — F34.1 DYSTHYMIA: Status: ACTIVE | Noted: 2017-07-14

## 2021-03-04 PROBLEM — K21.00 GASTROESOPHAGEAL REFLUX DISEASE WITH ESOPHAGITIS WITHOUT HEMORRHAGE: Status: ACTIVE | Noted: 2021-03-04

## 2021-03-04 LAB
ALBUMIN SERPL-MCNC: 3.7 G/DL (ref 3.4–5)
ALP SERPL-CCNC: 75 U/L (ref 40–150)
ALT SERPL W P-5'-P-CCNC: 15 U/L (ref 0–50)
ANION GAP SERPL CALCULATED.3IONS-SCNC: 7 MMOL/L (ref 3–14)
AST SERPL W P-5'-P-CCNC: 20 U/L (ref 0–45)
BILIRUB SERPL-MCNC: 0.5 MG/DL (ref 0.2–1.3)
BUN SERPL-MCNC: 10 MG/DL (ref 7–30)
CALCIUM SERPL-MCNC: 10 MG/DL (ref 8.5–10.1)
CHLORIDE SERPL-SCNC: 97 MMOL/L (ref 94–109)
CHOLEST SERPL-MCNC: 300 MG/DL
CO2 SERPL-SCNC: 28 MMOL/L (ref 20–32)
CREAT SERPL-MCNC: 0.98 MG/DL (ref 0.52–1.04)
FERRITIN SERPL-MCNC: 16 NG/ML (ref 8–252)
FOLATE SERPL-MCNC: 66.6 NG/ML
GFR SERPL CREATININE-BSD FRML MDRD: 55 ML/MIN/{1.73_M2}
GLUCOSE SERPL-MCNC: 94 MG/DL (ref 70–99)
HDLC SERPL-MCNC: 140 MG/DL
LDLC SERPL CALC-MCNC: 145 MG/DL
NONHDLC SERPL-MCNC: 160 MG/DL
POTASSIUM SERPL-SCNC: 3.8 MMOL/L (ref 3.4–5.3)
PROT SERPL-MCNC: 7.9 G/DL (ref 6.8–8.8)
SODIUM SERPL-SCNC: 132 MMOL/L (ref 133–144)
TRIGL SERPL-MCNC: 77 MG/DL
TSH SERPL DL<=0.005 MIU/L-ACNC: 1.05 MU/L (ref 0.4–4)
VIT B12 SERPL-MCNC: 645 PG/ML (ref 193–986)

## 2021-03-04 ASSESSMENT — ANXIETY QUESTIONNAIRES: GAD7 TOTAL SCORE: 2

## 2021-03-04 NOTE — TELEPHONE ENCOUNTER
"Please schedule appointment for patient with   Addiction Medicine Provider     For alcohol use disorder    Appt type: in person preferred      Our scheduling staff will offer the following information:   Please invite our patient to call [facility]'s assessment line - [phone]. They can ask for a \"substance use assessment\" or \"rule 25\". This will allow them to discuss possible psychosocial treatment options including individual therapy or any group options including outpatient, intensive outpatient, or residential (aka inpatient) treatment.     Jin Ackerman MD    "

## 2021-03-04 NOTE — PROGRESS NOTES
Clinic Care Coordination Contact  [facility]/Voicemail       Clinical Data: Care Coordinator Outreach  Outreach attempted x 1.  Left message on patient's voicemail with call back information and requested return call.  Plan: Care Coordinator will try to reach patient again in 1-2 business days.    Gianna Sloan, RN Care Coordinator  [facility]  Email: [email or web address]  Phone: [phone]

## 2021-03-04 NOTE — TELEPHONE ENCOUNTER
Looks like this appointment was already scheduled with Dr crabtree for in person visit on 3.12.2021 @ [facility]

## 2021-03-05 ENCOUNTER — PATIENT OUTREACH (OUTPATIENT)
Dept: CARE COORDINATION | Facility: CLINIC | Age: 76
End: 2021-03-05

## 2021-03-05 LAB
CREAT UR-MCNC: 158 MG/DL
MICROALBUMIN UR-MCNC: 42 MG/L
MICROALBUMIN/CREAT UR: 26.52 MG/G CR (ref 0–25)

## 2021-03-05 NOTE — PROGRESS NOTES
Clinic Care Coordination Contact  [facility]/Voicemail       Clinical Data: Care Coordinator Outreach  Outreach attempted x 2.      Spoke with patient, she states that she does not have time for RN CC assessment at the moment, and requests RN CC call back on 3/8/21 at 11am. Appointment scheduled.     Plan: Care Coordinator will try to reach patient again 3/8/21 at 11am as requested.       Gianna Sloan RN Care Coordinator  [facility]  Email: [email or web address]  Phone: [phone]

## 2021-03-08 ENCOUNTER — PATIENT OUTREACH (OUTPATIENT)
Dept: NURSING | Facility: CLINIC | Age: 76
End: 2021-03-08
Payer: COMMERCIAL

## 2021-03-08 NOTE — PROGRESS NOTES
Clinic Care Coordination Contact  [facility]/Voicemail       Clinical Data: Care Coordinator Outreach  Outreach attempted x 2.      Per patient's request called at scheduled 11am for RN CC assessment. Left message on patient's voicemail with call back information and requested return call.  Plan: Care Coordinator will try to reach patient again in 1-2 business days.    Gianna Sloan RN Care Coordinator  [facility]  Email: [email or web address]  Phone: [phone]

## 2021-03-08 NOTE — RESULT ENCOUNTER NOTE
Please mail result letter    Linda  I have reviewed your recent labs. Here are the results:    -Hemoglobin is decreased indicating anemia.  This is likely due to your kidney disease and/or alcohol use.  It is stable when compared to 5 months ago.  We will continue to monitor.  -White blood cell and platelet counts are normal.  -LDL(bad) cholesterol level is elevated which can increase your heart disease risk.  A diet high in fat and simple carbohydrates, genetics and being overweight can contribute to this. ADVISE: exercising 150 minutes of aerobic exercise per week (30 minutes for 5 days per week or 50 minutes for 3 days per week are options) and eating a low saturated fat/low carbohydrate diet are helpful to improve this. In 12 months, you should recheck your fasting cholesterol panel by scheduling a lab-only appointment.  -Liver and gallbladder tests (ALT,AST, Alk phos,bilirubin) are normal.  -Kidney function (GFR) is decreased but stable.  ADVISE: avoid NSAID medications and stay well hydrated (water/electrolyte drinks).  -Sodium is decreased.  ADVISE: This is likely due to your alcohol consumption.  Please work on discontinuation and start to work on a treatment program.  This should be rechecked within the next 3 months.  -Potassium is normal.  -Calcium is normal.  -Glucose (diabetic screening test) is normal.  -TSH (thyroid stimulating hormone) level is normal which indicates normal thyroid function.  -Low iron store levels (ferritin).  ADVISE: increasing iron in your diet and consider taking iron supplement for 2 months (ferrous gluconate 325 mg twice daily - to avoid constipation from the supplement you should increase fluid intake and fiber in your diet)  Also, recheck your labs in 2 months.  -Microalbumin (urine protein) level is elevated. This is suggestive of early damage to your kidneys from high blood pressure.  ADVISE: avoiding anti-inflamatory agents such as ibuprofen (Advil, Motrin) or naproxen  (Aleve) as much as possible, keeping your blood pressure in a normal range, and continuing your medication (lisinopril) that helps protect your kidneys.  Also, this should be rechecked in 1 year.   -Folate and B12 are normal.  For additional lab test information, labtestsonline.org is an excellent reference.    If you have any questions please do not hesitate to contact our office via phone ([phone]) or MyChart.    Nayeli Castorena, MS, PA-C  [facility]

## 2021-03-09 ENCOUNTER — PATIENT OUTREACH (OUTPATIENT)
Dept: CARE COORDINATION | Facility: CLINIC | Age: 76
End: 2021-03-09

## 2021-03-09 NOTE — PROGRESS NOTES
Clinic Care Coordination Contact  [facility]       Clinical Data: Care Coordinator Outreach  Outreach attempted x 3.  Left message on patient's voicemail with call back information and requested return call.  Plan: Care Coordinator will send unable to contact letter with care coordinator contact information via mail. Care Coordinator will do no further outreaches at this time.    Gianna Sloan RN Care Coordinator  [facility]  Email: [email or web address]  Phone: [phone]

## 2021-03-09 NOTE — LETTER
[facility] CARE COORDINATION  [address] [address]    March 9, 2021    Kavitha Headley  [address] [address]      Dear Kavitha,    I have been unsuccessful in reaching you since our last contact. At this time the Care Coordination team will make no further attempts to reach you, however this does not change your ability to continue receiving care from your providers at your primary care clinic. If you need additional support from a care coordinator in the future please contact Rajendra Bhakta Community Health Worker at [phone].    All of us at [facility] are invested in your health and are here to assist you in meeting your goals.     Sincerely,    Gianna Sloan RN Care Coordinator  [facility]  Email: [email or web address]  Phone: [phone]

## 2021-03-16 ENCOUNTER — OFFICE VISIT (OUTPATIENT)
Dept: CARDIOLOGY | Facility: CLINIC | Age: 76
End: 2021-03-16
Payer: COMMERCIAL

## 2021-03-16 ENCOUNTER — HOSPITAL ENCOUNTER (OUTPATIENT)
Dept: CARDIOLOGY | Facility: CLINIC | Age: 76
Discharge: HOME | End: 2021-03-16
Attending: PHYSICIAN ASSISTANT | Admitting: PHYSICIAN ASSISTANT
Payer: COMMERCIAL

## 2021-03-16 VITALS
HEIGHT: 64 IN | SYSTOLIC BLOOD PRESSURE: 160 MMHG | OXYGEN SATURATION: 94 % | WEIGHT: 175 LBS | DIASTOLIC BLOOD PRESSURE: 82 MMHG | BODY MASS INDEX: 29.88 KG/M2 | HEART RATE: 85 BPM

## 2021-03-16 DIAGNOSIS — I35.0 AORTIC STENOSIS: ICD-10-CM

## 2021-03-16 DIAGNOSIS — F10.20 ALCOHOL USE DISORDER, SEVERE, DEPENDENCE (H): ICD-10-CM

## 2021-03-16 DIAGNOSIS — I77.810 MILD ASCENDING AORTA DILATATION (H): ICD-10-CM

## 2021-03-16 DIAGNOSIS — I35.0 NONRHEUMATIC AORTIC VALVE STENOSIS: Primary | ICD-10-CM

## 2021-03-16 DIAGNOSIS — I10 BENIGN ESSENTIAL HYPERTENSION: ICD-10-CM

## 2021-03-16 PROBLEM — I47.10 PAROXYSMAL SUPRAVENTRICULAR TACHYCARDIA (H): Status: RESOLVED | Noted: 2018-08-15 | Resolved: 2021-03-16

## 2021-03-16 PROBLEM — E87.1 HYPONATREMIA: Status: RESOLVED | Noted: 2017-06-04 | Resolved: 2021-03-16

## 2021-03-16 PROBLEM — M20.21 HALLUX RIGIDUS OF RIGHT FOOT: Status: RESOLVED | Noted: 2020-01-31 | Resolved: 2021-03-16

## 2021-03-16 PROBLEM — F34.1 DYSTHYMIA: Status: RESOLVED | Noted: 2017-07-14 | Resolved: 2021-03-16

## 2021-03-16 PROBLEM — I50.9 CHF (CONGESTIVE HEART FAILURE) (H): Status: RESOLVED | Noted: 2020-02-11 | Resolved: 2021-03-16

## 2021-03-16 PROCEDURE — 93325 DOPPLER ECHO COLOR FLOW MAPG: CPT | Mod: 26 | Performed by: INTERNAL MEDICINE

## 2021-03-16 PROCEDURE — 93321 DOPPLER ECHO F-UP/LMTD STD: CPT | Mod: 26 | Performed by: INTERNAL MEDICINE

## 2021-03-16 PROCEDURE — 99214 OFFICE O/P EST MOD 30 MIN: CPT | Mod: 25 | Performed by: INTERNAL MEDICINE

## 2021-03-16 PROCEDURE — 93325 DOPPLER ECHO COLOR FLOW MAPG: CPT

## 2021-03-16 PROCEDURE — 93308 TTE F-UP OR LMTD: CPT

## 2021-03-16 PROCEDURE — 93308 TTE F-UP OR LMTD: CPT | Mod: 26 | Performed by: INTERNAL MEDICINE

## 2021-03-16 PROCEDURE — 255N000002 HC RX 255 OP 636: Performed by: PHYSICIAN ASSISTANT

## 2021-03-16 PROCEDURE — 93000 ELECTROCARDIOGRAM COMPLETE: CPT | Performed by: INTERNAL MEDICINE

## 2021-03-16 RX ORDER — FUROSEMIDE 20 MG
20 TABLET ORAL DAILY
Qty: 100 TABLET | Refills: 4 | COMMUNITY
Start: 2021-03-16 | End: 2021-10-20

## 2021-03-16 RX ORDER — LOSARTAN POTASSIUM 25 MG/1
25 TABLET ORAL EVERY MORNING
Qty: 30 TABLET | Refills: 3 | Status: SHIPPED | OUTPATIENT
Start: 2021-03-16 | End: 2021-06-28

## 2021-03-16 RX ORDER — ATENOLOL 25 MG/1
25 TABLET ORAL DAILY
Qty: 100 TABLET | Refills: 4 | Status: ON HOLD | COMMUNITY
Start: 2021-03-16 | End: 2021-03-29

## 2021-03-16 RX ADMIN — HUMAN ALBUMIN MICROSPHERES AND PERFLUTREN 9 ML: 10; .22 INJECTION, SOLUTION INTRAVENOUS at 11:00

## 2021-03-16 ASSESSMENT — MIFFLIN-ST. JEOR: SCORE: 1263.79

## 2021-03-16 NOTE — PROGRESS NOTES
Service Date: 03/16/2021      PRIMARY CARE PROVIDER:  Nayeli Castorena PA-C      REASON FOR VISIT:   1.  Followup of moderately severe aortic valve stenosis.   2.  Benign essential hypertension.      HISTORY OF PRESENT ILLNESS:    Linda is well known to me from previous visits.  She is a delightful 75-year-old  lady, who always comes impeccably and beautifully dressed.  She was unaccompanied today.  She lives with a significant other.      Medical history is significant for:  1.  Moderately severe aortic valve stenosis.  Visually, the valve appears moderately stenotic, but hemodynamics suggest a moderate to severe range.  She is physically active and has been asymptomatic.   2.  Stable mild ascending aorta dilatation of 4.1 cm, confirmed on CTA.  Normal aortic root.   3.  History of alcohol dependence with multiple rehabilitation interventions.  Drinks demetrice.   4.  Never tobacco user.   5.  Benign essential hypertension.   6.  BMI 30 kg/m2, following a previous gastric bypass surgery.      Over the pandemic year, like most of us, Linda has gained about 10 pounds in weight due to decreased physical activity and increased caloric consumption.  In this context, she has noticed mild exertional dyspnea.  She is hoping to get back to the gym.  She has not received the COVID-19 vaccine yet because she missed her appointment and has not made another one.  She denies chest pain, dizziness, presyncope or syncope.      The other issue is her blood pressure has recently been elevated.  Today it is 160/82 mmHg with a pulse of 85 BPM and it has been similar at her other clinic visits per patient.  During her echocardiogram earlier today, it was 153/83 mmHg.  She is currently on furosemide 20 mg daily and atenolol 25 mg daily.      DIAGNOSTIC DATA:    Reviewed ECG image from today, which showed sinus rhythm of 90 BPM with occasional premature atrial complex.      I personally reviewed her echocardiogram images.  The valve  continues to look moderately stenotic and calcified.  The peak transaortic velocity is 3.7 meters per second (may be slightly over-traced), mean gradient is 28 mmHg, valve area 0.8 cm2, normal stroke volume index of 42 mL/m2, a velocity ratio of 0.27.  There is trace regurgitation.  Ascending aorta dilatation is stable at 4.1-4.2 cm.  Normal left ventricular wall thickness, normal systolic function, LVEF 65%-70% with normal wall motion.  Normal right ventricular size and systolic function.  Annular calcification with mild to moderate eccentric mitral regurgitation.      PHYSICAL EXAMINATION:   CONSTITUTIONAL:  /82, pulse 85 per minute and regular.   CARDIOVSCULAR:  Apical impulse, normal to palpation.  Normal JVP normal, normal heart sounds, including audible second heart sound.  She has shrill late-peaking ejection systolic murmur that radiates to both carotids.   LUNGS:  Clear to auscultation.   EXTREMITIES:  No edema.      DIAGNOSES:   1.  Uncontrolled hypertension.   2.  Moderately severe aortic valve stenosis.      ASSESSMENT:    Per my personal review of images, her aortic valve remains moderately stenotic visually and moderately severe by hemodynamics with preserved LVEF and normal LV wall thickness.  I think her blood pressure needs better control and I will be making medication adjustments.      PLAN:   1.  Start losartan 25 mg daily.  Prescription given.   2.  Renewed prescription for atenolol 25 mg daily and furosemide 20 mg daily.   3.  The patient reiterates that she has been sober.   4.  Follow up with me in 2 months with a basic metabolic panel and to check her blood pressure.  I went over the side effects of losartan, which are essentially none.  But if she gets a rash, she will stop it and call my office.   5.  Following her next visit, I will increase her clinic visits to 6-monthly to keep an eye on the aortic valve stenosis and we can continue with surveillance imaging every 9-12 months.       Total time 30 minutes, greater than 50% spent in counseling and coordination of care.      cc:   Nayeli Castorena PA-C   [address] [address]         [facility] DEDE CORTEZ MD             D: 2021   T: 2021   MT: al      Name:     DARWIN JASSO   MRN:      [id number]        Account:      [id number]   :      1945           Service Date: 2021      Document: [id number]

## 2021-03-16 NOTE — LETTER
3/16/2021    Nayeli Castorena PA-C  [address] [address]    RE: Kavitha Headley       Dear Colleague,    I had the pleasure of seeing Kavitha Headley in the [facility].    Clinic visit note dictated. Dictation reference number - [id number]        REVIEW OF SYSTEMS:  A comprehensive 10-point review of systems was completed and the pertinent positives are documented in the history of present illness.    Skin:  Negative     Eyes:  Positive for glasses  ENT:  Negative    Respiratory:  Positive for dyspnea on exertion  Cardiovascular:  Negative    Gastroenterology: Negative    Genitourinary:  Negative    Musculoskeletal:  Positive for arthritis;foot pain;back pain  Neurologic:  Negative    Psychiatric:  Positive for sleep disturbances(4-5 hrs per night)  Heme/Lymph/Imm:  Positive for allergies  Endocrine:  Negative      CURRENT MEDICATIONS:  Current Outpatient Medications   Medication Sig Dispense Refill     aspirin 81 MG EC tablet Take 81 mg by mouth daily       aspirin-acetaminophen-caffeine (EXCEDRIN MIGRAINE) 250-250-65 MG tablet Take 2 tablets by mouth daily as needed for headaches       atenolol (TENORMIN) 25 MG tablet Take 1 tablet (25 mg) by mouth daily 100 tablet 4     baclofen (LIORESAL) 10 MG tablet Take 1 tablet (10 mg) by mouth 3 times daily 90 tablet 1     BIOTIN PO Take 1 tablet by mouth every morning       buPROPion (WELLBUTRIN XL) 150 MG 24 hr tablet Take 1 tablet (150 mg) by mouth every morning 90 tablet 1     citalopram (CELEXA) 20 MG tablet Take 1 tablet (20 mg) by mouth daily 90 tablet 1     folic acid (FOLVITE) 1 MG tablet Take 1 mg by mouth daily       furosemide (LASIX) 20 MG tablet Take 1 tablet (20 mg) by mouth daily TAKE 1 TABLET DAILY 100 tablet 4     gabapentin (NEURONTIN) 300 MG capsule Take 1 capsule (300 mg) by mouth 3 times daily 270 capsule 1     hydrOXYzine (ATARAX) 25 MG tablet Take 1 tablet (25 mg) by mouth  every 6 hours 90 tablet 1     losartan (COZAAR) 25 MG tablet Take 1 tablet (25 mg) by mouth every morning 30 tablet 3     mirtazapine (REMERON) 15 MG tablet Take 1 tablet (15 mg) by mouth At Bedtime 90 tablet 1     Multiple Vitamins-Minerals (CENTRUM SILVER) per tablet Take 1 tablet by mouth daily       polyethylene glycol (MIRALAX/GLYCOLAX) Packet Take 17 g by mouth daily as needed (constipation) 7 packet      senna-docusate (SENOKOT-S;PERICOLACE) 8.6-50 MG per tablet Take 1-2 tablets by mouth 2 times daily as needed for constipation 100 tablet      traZODone (DESYREL) 100 MG tablet Take 1 tablet (100 mg) by mouth At Bedtime 90 tablet 1     valACYclovir (VALTREX) 1000 mg tablet TAKE 2 TABS BY MOUTH EVERY 12 HOURS FOR 1 DAY ONLY FOR OUTBREAKS OF COLD SORES AS NEEDED 4 tablet 3     vitamin C (ASCORBIC ACID) 250 MG tablet Take 250 mg by mouth daily           ALLERGIES:  Allergies   Allergen Reactions     Bactrim [Sulfamethoxazole W/Trimethoprim] Hives     Codeine Itching     NAUSEA     Morphine Itching     NAUSEA       PAST MEDICAL HISTORY:    Past Medical History:   Diagnosis Date     Alcohol abuse     Long term alcohol abuse. Abstinenet since October 2019.     Anxiety disorder      Ascending aorta dilatation (H)      Bariatric surgery status 1996?    gastric bypass, [facility] and     Benign hypertension      Chronic insomnia      Chronic pain syndrome     Chronic back and neck pain, chronic pain due to osteoarthritis multiple joints     Coronary artery disease involving native coronary artery of native heart without angina pectoris 10/16/2018    Minimal coronary artery disease on coronary angiogram in 2015.      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     Major  depressive disorder, single episode, severe, without mention of psychotic behavior      Mixed hyperlipidemia      Moderate aortic stenosis 05/2014    moderate to severe aortic valve stenosis     Pelvic relaxation disorder     Surgical intervention for cystocele/rectocele 3,11/2012     Personal history of urinary calculi 6/2006    left ureteral stone,lithotripsy     Psoriasis      PVC (premature ventricular contraction)      Spinal stenosis      Stage III chronic kidney disease 2005     SVT (supraventricular tachycardia) (H)     likely atrial tachycardia       PAST SURGICAL HISTORY:    Past Surgical History:   Procedure Laterality Date     APPENDECTOMY  3/2004    incidental     ARTHRODESIS TOE(S) Right 1/31/2020    Procedure: RIGHT FIRST METATARSAL PHALANGEAL JOINT ARTHRODESIS;  Surgeon: Steven Reyes MD;  Location:  OR      GASTRIC BYPASS,OBESE<100CM ARIANNA-EN-Y  1996     C MEDIASTINOSCOPY W OR WO BIOPSY  2/2008    Videomediastinoscopy and, for mediastinal adenopathy -reactive lymphoid hyperplasia     C REPAIR OF RECTOCELE  3/2012     C TOTAL KNEE ARTHROPLASTY  12/2005    left      CARPAL TUNNEL RELEASE RT/LT  10/2010    Carpometacarpal excisional arthroplasty with a fascial autograft and APL suspension sling (84812). 2. Left thumb metacarpophalangeal joint fusion with autologous bone graft (92391). 3. Left endoscopic carpal tunnel release      CHOLECYSTECTOMY, LAPOROSCOPIC  11/2010    Cholecystectomy, Laparoscopic     COLONOSCOPY N/A 9/8/2016    Procedure: COMBINED COLONOSCOPY, SINGLE OR MULTIPLE BIOPSY/POLYPECTOMY BY BIOPSY;  Surgeon: Moe Barlow MD;  Location:  GI     CYSTOCELE REPAIR  11/2012    davinc laparoscopic sacrocolpopexy, enterocele repair, lysis of adhesions, placement of retropubic mid urethral sling, cystoscopy     CYSTOSCOPY, LITHOTRIPSY, COMBINED  6/2006    Left extracorporeal shock wave lithotripsy, cystoscopy, left ureteral stent placement.     CYSTOSCOPY, REMOVE STENT(S), COMBINED   7/2006    Cystoscopy, removal of left ureteral stent, retrograde pyelography, flexible and rigid ureteroscopy and holmium laser lithotripsy, basket removal of stone fragments, ureteral stent placement.      ENDOSCOPIC ULTRASOUND UPPER GASTROINTESTINAL TRACT (GI) N/A 6/12/2017    Procedure: ENDOSCOPIC ULTRASOUND, ESOPHAGOSCOPY / UPPER GASTROINTESTINAL TRACT (GI);  ENDOSCOPIC ULTRASOUND, ESOPHAGOSCOPY / UPPER GASTROINTESTINAL TRACT (GI);  Surgeon: Parth Graham MD;  Location:  GI     HERNIA REPAIR  4/2012    bilateral augmentation mastopexy, ventral hernia repair, and medial thigh liposuction on 04/06/2012.      HYSTERECTOMY VAGINAL, BILATERAL SALPINGO-OOPHERECTOMY, COMBINED  1998    due to myoma and bleeding     JOINT REPLACEMENT, HIP RT/LT  4/2004    right total hip arthroplasty     LAPAROTOMY, LYSIS ADHESIONS, COMBINED  3/2004    lysis adhesions, ventral hernia repair, appendectomy incidentally     LYMPH NODE BIOPSY  4/2008    right axillary, reactive follicular and paracortical hyperplasia.     MAMMOPLASTY AUGMENTATION BILATERAL  4/2012     REPAIR HAMMER TOE Right 1/31/2020    Procedure: WITH SECOND AND THIRD CLAW TOE RECONSTRUCTION;  Surgeon: Steven Reyes MD;  Location:  OR     REVISE RECONSTRUCTED BREAST  6/7/2012    Left breast capsulotomy.        FAMILY HISTORY:    Family History   Problem Relation Age of Onset     Substance Abuse Father      Cancer Father         throat and lung mets     Diabetes No family hx of      Coronary Artery Disease No family hx of      Cerebrovascular Disease No family hx of        SOCIAL HISTORY:    Social History     Socioeconomic History     Marital status:      Spouse name: Mt     Number of children: 4     Years of education: 18     Highest education level: None   Occupational History     Occupation: nurse     Employer: [employer]     Employer: RETIRED   Social Needs     Financial resource strain: None     Food insecurity     Worry: None     Inability:  "None     Transportation needs     Medical: None     Non-medical: None   Tobacco Use     Smoking status: Never Smoker     Smokeless tobacco: Never Used   Substance and Sexual Activity     Alcohol use: Yes     Alcohol/week: 63.0 standard drinks     Types: 63 Standard drinks or equivalent per week     Comment: one drink per day     Drug use: No     Sexual activity: Yes     Partners: Male   Lifestyle     Physical activity     Days per week: None     Minutes per session: None     Stress: None   Relationships     Social connections     Talks on phone: None     Gets together: None     Attends Mu-ism service: None     Active member of club or organization: None     Attends meetings of clubs or organizations: None     Relationship status: None     Intimate partner violence     Fear of current or ex partner: None     Emotionally abused: None     Physically abused: None     Forced sexual activity: None   Other Topics Concern      Service Not Asked     Blood Transfusions No     Caffeine Concern Yes     Comment: 1-2 cups per day      Occupational Exposure Yes     Comment: blood     Hobby Hazards No     Sleep Concern Yes     Stress Concern Yes     Weight Concern Yes     Comment: gastric  byepass     Special Diet No     Back Care No     Exercise Yes     Comment: walk, swin     Bike Helmet No     Seat Belt Yes     Self-Exams Yes     Parent/sibling w/ CABG, MI or angioplasty before 65F 55M? Not Asked   Social History Narrative     None       PHYSICAL EXAM:    Vitals: BP (!) 160/82   Pulse 85   Ht 1.626 m (5' 4\")   Wt 79.4 kg (175 lb)   SpO2 94%   BMI 30.04 kg/m    Wt Readings from Last 5 Encounters:   03/16/21 79.4 kg (175 lb)   03/03/21 76.7 kg (169 lb)   09/25/20 74.4 kg (164 lb)   07/20/20 73 kg (161 lb)   03/17/20 65.8 kg (145 lb)     .          Encounter Diagnoses   Name Primary?     Nonrheumatic aortic valve stenosis Yes     Alcohol use disorder, severe, dependence (H)      Mild ascending aorta dilatation (H)  "     Benign essential hypertension        Orders Placed This Encounter   Procedures     Basic metabolic panel     Follow-Up with Cardiologist     Thank you for allowing me to participate in the care of your patient.      Sincerely,     Herman Ugarte MD     [facility]    cc:   Herman Ugarte MD  [address] [place] [id number]

## 2021-03-16 NOTE — PATIENT INSTRUCTIONS
MEDICATION CHANGES:  1.  Your blood pressure has been high.  Your goal blood pressure is 140/80 mmHg or below.  2.  Start a new medication called losartan 25 mg.  Take 1 tablet in the morning.  3.  Continue all other medications.    FOLLOW-UP:  1.  Follow-up with Dr. Ugarte in 2 months for a blood pressure check and a blood test.    If you have any questions or concerns, please contact my nurses at [phone].

## 2021-03-16 NOTE — PROGRESS NOTES
Clinic visit note dictated. Dictation reference number - [id number]        REVIEW OF SYSTEMS:  A comprehensive 10-point review of systems was completed and the pertinent positives are documented in the history of present illness.    Skin:  Negative     Eyes:  Positive for glasses  ENT:  Negative    Respiratory:  Positive for dyspnea on exertion  Cardiovascular:  Negative    Gastroenterology: Negative    Genitourinary:  Negative    Musculoskeletal:  Positive for arthritis;foot pain;back pain  Neurologic:  Negative    Psychiatric:  Positive for sleep disturbances(4-5 hrs per night)  Heme/Lymph/Imm:  Positive for allergies  Endocrine:  Negative      CURRENT MEDICATIONS:  Current Outpatient Medications   Medication Sig Dispense Refill     aspirin 81 MG EC tablet Take 81 mg by mouth daily       aspirin-acetaminophen-caffeine (EXCEDRIN MIGRAINE) 250-250-65 MG tablet Take 2 tablets by mouth daily as needed for headaches       atenolol (TENORMIN) 25 MG tablet Take 1 tablet (25 mg) by mouth daily 100 tablet 4     baclofen (LIORESAL) 10 MG tablet Take 1 tablet (10 mg) by mouth 3 times daily 90 tablet 1     BIOTIN PO Take 1 tablet by mouth every morning       buPROPion (WELLBUTRIN XL) 150 MG 24 hr tablet Take 1 tablet (150 mg) by mouth every morning 90 tablet 1     citalopram (CELEXA) 20 MG tablet Take 1 tablet (20 mg) by mouth daily 90 tablet 1     folic acid (FOLVITE) 1 MG tablet Take 1 mg by mouth daily       furosemide (LASIX) 20 MG tablet Take 1 tablet (20 mg) by mouth daily TAKE 1 TABLET DAILY 100 tablet 4     gabapentin (NEURONTIN) 300 MG capsule Take 1 capsule (300 mg) by mouth 3 times daily 270 capsule 1     hydrOXYzine (ATARAX) 25 MG tablet Take 1 tablet (25 mg) by mouth every 6 hours 90 tablet 1     losartan (COZAAR) 25 MG tablet Take 1 tablet (25 mg) by mouth every morning 30 tablet 3     mirtazapine (REMERON) 15 MG tablet Take 1 tablet (15 mg) by mouth At Bedtime 90 tablet 1     Multiple Vitamins-Minerals (CENTRUM  SILVER) per tablet Take 1 tablet by mouth daily       polyethylene glycol (MIRALAX/GLYCOLAX) Packet Take 17 g by mouth daily as needed (constipation) 7 packet      senna-docusate (SENOKOT-S;PERICOLACE) 8.6-50 MG per tablet Take 1-2 tablets by mouth 2 times daily as needed for constipation 100 tablet      traZODone (DESYREL) 100 MG tablet Take 1 tablet (100 mg) by mouth At Bedtime 90 tablet 1     valACYclovir (VALTREX) 1000 mg tablet TAKE 2 TABS BY MOUTH EVERY 12 HOURS FOR 1 DAY ONLY FOR OUTBREAKS OF COLD SORES AS NEEDED 4 tablet 3     vitamin C (ASCORBIC ACID) 250 MG tablet Take 250 mg by mouth daily           ALLERGIES:  Allergies   Allergen Reactions     Bactrim [Sulfamethoxazole W/Trimethoprim] Hives     Codeine Itching     NAUSEA     Morphine Itching     NAUSEA       PAST MEDICAL HISTORY:    Past Medical History:   Diagnosis Date     Alcohol abuse     Long term alcohol abuse. Abstinenet since October 2019.     Anxiety disorder      Ascending aorta dilatation (H)      Bariatric surgery status 1996?    gastric bypass, [facility] and     Benign hypertension      Chronic insomnia      Chronic pain syndrome     Chronic back and neck pain, chronic pain due to osteoarthritis multiple joints     Coronary artery disease involving native coronary artery of native heart without angina pectoris 10/16/2018    Minimal coronary artery disease on coronary angiogram in 2015.      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     Major depressive disorder, single episode, severe, without mention of psychotic behavior      Mixed hyperlipidemia      Moderate aortic stenosis 05/2014    moderate to severe aortic valve stenosis     Pelvic relaxation disorder     Surgical intervention for  cystocele/rectocele 3,11/2012     Personal history of urinary calculi 6/2006    left ureteral stone,lithotripsy     Psoriasis      PVC (premature ventricular contraction)      Spinal stenosis      Stage III chronic kidney disease 2005     SVT (supraventricular tachycardia) (H)     likely atrial tachycardia       PAST SURGICAL HISTORY:    Past Surgical History:   Procedure Laterality Date     APPENDECTOMY  3/2004    incidental     ARTHRODESIS TOE(S) Right 1/31/2020    Procedure: RIGHT FIRST METATARSAL PHALANGEAL JOINT ARTHRODESIS;  Surgeon: Steven Reyes MD;  Location:  OR     C GASTRIC BYPASS,OBESE<100CM ARIANNA-EN-Y  1996     C MEDIASTINOSCOPY W OR WO BIOPSY  2/2008    Videomediastinoscopy and, for mediastinal adenopathy -reactive lymphoid hyperplasia     C REPAIR OF RECTOCELE  3/2012     C TOTAL KNEE ARTHROPLASTY  12/2005    left      CARPAL TUNNEL RELEASE RT/LT  10/2010    Carpometacarpal excisional arthroplasty with a fascial autograft and APL suspension sling (27599). 2. Left thumb metacarpophalangeal joint fusion with autologous bone graft (42899). 3. Left endoscopic carpal tunnel release      CHOLECYSTECTOMY, LAPOROSCOPIC  11/2010    Cholecystectomy, Laparoscopic     COLONOSCOPY N/A 9/8/2016    Procedure: COMBINED COLONOSCOPY, SINGLE OR MULTIPLE BIOPSY/POLYPECTOMY BY BIOPSY;  Surgeon: Moe Barlow MD;  Location:  GI     CYSTOCELE REPAIR  11/2012    davinc laparoscopic sacrocolpopexy, enterocele repair, lysis of adhesions, placement of retropubic mid urethral sling, cystoscopy     CYSTOSCOPY, LITHOTRIPSY, COMBINED  6/2006    Left extracorporeal shock wave lithotripsy, cystoscopy, left ureteral stent placement.     CYSTOSCOPY, REMOVE STENT(S), COMBINED  7/2006    Cystoscopy, removal of left ureteral stent, retrograde pyelography, flexible and rigid ureteroscopy and holmium laser lithotripsy, basket removal of stone fragments, ureteral stent placement.      ENDOSCOPIC ULTRASOUND UPPER GASTROINTESTINAL  TRACT (GI) N/A 6/12/2017    Procedure: ENDOSCOPIC ULTRASOUND, ESOPHAGOSCOPY / UPPER GASTROINTESTINAL TRACT (GI);  ENDOSCOPIC ULTRASOUND, ESOPHAGOSCOPY / UPPER GASTROINTESTINAL TRACT (GI);  Surgeon: Parth Graham MD;  Location:  GI     HERNIA REPAIR  4/2012    bilateral augmentation mastopexy, ventral hernia repair, and medial thigh liposuction on 04/06/2012.      HYSTERECTOMY VAGINAL, BILATERAL SALPINGO-OOPHERECTOMY, COMBINED  1998    due to myoma and bleeding     JOINT REPLACEMENT, HIP RT/LT  4/2004    right total hip arthroplasty     LAPAROTOMY, LYSIS ADHESIONS, COMBINED  3/2004    lysis adhesions, ventral hernia repair, appendectomy incidentally     LYMPH NODE BIOPSY  4/2008    right axillary, reactive follicular and paracortical hyperplasia.     MAMMOPLASTY AUGMENTATION BILATERAL  4/2012     REPAIR HAMMER TOE Right 1/31/2020    Procedure: WITH SECOND AND THIRD CLAW TOE RECONSTRUCTION;  Surgeon: Steven Reyes MD;  Location:  OR     REVISE RECONSTRUCTED BREAST  6/7/2012    Left breast capsulotomy.        FAMILY HISTORY:    Family History   Problem Relation Age of Onset     Substance Abuse Father      Cancer Father         throat and lung mets     Diabetes No family hx of      Coronary Artery Disease No family hx of      Cerebrovascular Disease No family hx of        SOCIAL HISTORY:    Social History     Socioeconomic History     Marital status:      Spouse name: Mt     Number of children: 4     Years of education: 18     Highest education level: None   Occupational History     Occupation: nurse     Employer: [employer]     Employer: RETIRED   Social Needs     Financial resource strain: None     Food insecurity     Worry: None     Inability: None     Transportation needs     Medical: None     Non-medical: None   Tobacco Use     Smoking status: Never Smoker     Smokeless tobacco: Never Used   Substance and Sexual Activity     Alcohol use: Yes     Alcohol/week: 63.0 standard drinks     Types:  "63 Standard drinks or equivalent per week     Comment: one drink per day     Drug use: No     Sexual activity: Yes     Partners: Male   Lifestyle     Physical activity     Days per week: None     Minutes per session: None     Stress: None   Relationships     Social connections     Talks on phone: None     Gets together: None     Attends [employer] service: None     Active member of club or organization: None     Attends meetings of clubs or organizations: None     Relationship status: None     Intimate partner violence     Fear of current or ex partner: None     Emotionally abused: None     Physically abused: None     Forced sexual activity: None   Other Topics Concern      Service Not Asked     Blood Transfusions No     Caffeine Concern Yes     Comment: 1-2 cups per day      Occupational Exposure Yes     Comment: blood     Hobby Hazards No     Sleep Concern Yes     Stress Concern Yes     Weight Concern Yes     Comment: gastric  byepass     Special Diet No     Back Care No     Exercise Yes     Comment: walk, swin     Bike Helmet No     Seat Belt Yes     Self-Exams Yes     Parent/sibling w/ CABG, MI or angioplasty before 65F 55M? Not Asked   Social History Narrative     None       PHYSICAL EXAM:    Vitals: BP (!) 160/82   Pulse 85   Ht 1.626 m (5' 4\")   Wt 79.4 kg (175 lb)   SpO2 94%   BMI 30.04 kg/m    Wt Readings from Last 5 Encounters:   03/16/21 79.4 kg (175 lb)   03/03/21 76.7 kg (169 lb)   09/25/20 74.4 kg (164 lb)   07/20/20 73 kg (161 lb)   03/17/20 65.8 kg (145 lb)     .          Encounter Diagnoses   Name Primary?     Nonrheumatic aortic valve stenosis Yes     Alcohol use disorder, severe, dependence (H)      Mild ascending aorta dilatation (H)      Benign essential hypertension        Orders Placed This Encounter   Procedures     Basic metabolic panel     Follow-Up with Cardiologist               "

## 2021-03-18 LAB — DEPRECATED CALCIDIOL+CALCIFEROL SERPL-MC: NORMAL UG/L (ref 20–75)

## 2021-03-23 ENCOUNTER — APPOINTMENT (OUTPATIENT)
Dept: GENERAL RADIOLOGY | Facility: CLINIC | Age: 76
DRG: 897 | End: 2021-03-23
Attending: EMERGENCY MEDICINE
Payer: COMMERCIAL

## 2021-03-23 ENCOUNTER — HOSPITAL ENCOUNTER (INPATIENT)
Facility: CLINIC | Age: 76
LOS: 6 days | Discharge: NURSING, REHAB OR LONG-TERM CARE | DRG: 897 | End: 2021-03-29
Attending: EMERGENCY MEDICINE | Admitting: INTERNAL MEDICINE
Payer: COMMERCIAL

## 2021-03-23 DIAGNOSIS — Z11.52 ENCOUNTER FOR SCREENING LABORATORY TESTING FOR SEVERE ACUTE RESPIRATORY SYNDROME CORONAVIRUS 2 (SARS-COV-2): ICD-10-CM

## 2021-03-23 DIAGNOSIS — I10 ESSENTIAL HYPERTENSION: Primary | ICD-10-CM

## 2021-03-23 DIAGNOSIS — M62.81 MUSCLE WEAKNESS (GENERALIZED): ICD-10-CM

## 2021-03-23 DIAGNOSIS — M62.81 GENERALIZED MUSCLE WEAKNESS: ICD-10-CM

## 2021-03-23 DIAGNOSIS — F10.930 ALCOHOL WITHDRAWAL SYNDROME WITHOUT COMPLICATION (H): ICD-10-CM

## 2021-03-23 DIAGNOSIS — K59.01 SLOW TRANSIT CONSTIPATION: ICD-10-CM

## 2021-03-23 DIAGNOSIS — I50.9 HEART FAILURE, UNSPECIFIED HF CHRONICITY, UNSPECIFIED HEART FAILURE TYPE (H): ICD-10-CM

## 2021-03-23 LAB
ALBUMIN SERPL-MCNC: 3.6 G/DL (ref 3.4–5)
ALCOHOL BREATH TEST: 0 (ref 0–0.01)
ALP SERPL-CCNC: 63 U/L (ref 40–150)
ALT SERPL W P-5'-P-CCNC: 14 U/L (ref 0–50)
AMPHETAMINES UR QL SCN: NEGATIVE
ANION GAP SERPL CALCULATED.3IONS-SCNC: 9 MMOL/L (ref 3–14)
AST SERPL W P-5'-P-CCNC: 11 U/L (ref 0–45)
BARBITURATES UR QL: NEGATIVE
BASOPHILS # BLD AUTO: 0 10E9/L (ref 0–0.2)
BASOPHILS NFR BLD AUTO: 0.3 %
BENZODIAZ UR QL: NEGATIVE
BILIRUB SERPL-MCNC: 0.3 MG/DL (ref 0.2–1.3)
BUN SERPL-MCNC: 23 MG/DL (ref 7–30)
CALCIUM SERPL-MCNC: 8.8 MG/DL (ref 8.5–10.1)
CANNABINOIDS UR QL SCN: NEGATIVE
CHLORIDE SERPL-SCNC: 98 MMOL/L (ref 94–109)
CO2 SERPL-SCNC: 28 MMOL/L (ref 20–32)
COCAINE UR QL: NEGATIVE
CREAT SERPL-MCNC: 1.22 MG/DL (ref 0.52–1.04)
DIFFERENTIAL METHOD BLD: ABNORMAL
EOSINOPHIL # BLD AUTO: 0 10E9/L (ref 0–0.7)
EOSINOPHIL NFR BLD AUTO: 0.4 %
ERYTHROCYTE [DISTWIDTH] IN BLOOD BY AUTOMATED COUNT: 15 % (ref 10–15)
ETHANOL UR QL SCN: NEGATIVE
FLUAV RNA RESP QL NAA+PROBE: NEGATIVE
FLUBV RNA RESP QL NAA+PROBE: NEGATIVE
GFR SERPL CREATININE-BSD FRML MDRD: 43 ML/MIN/{1.73_M2}
GLUCOSE SERPL-MCNC: 114 MG/DL (ref 70–99)
HCT VFR BLD AUTO: 30.8 % (ref 35–47)
HGB BLD-MCNC: 10.1 G/DL (ref 11.7–15.7)
IMM GRANULOCYTES # BLD: 0 10E9/L (ref 0–0.4)
IMM GRANULOCYTES NFR BLD: 0.3 %
LABORATORY COMMENT REPORT: NORMAL
LYMPHOCYTES # BLD AUTO: 1.1 10E9/L (ref 0.8–5.3)
LYMPHOCYTES NFR BLD AUTO: 15.3 %
MAGNESIUM SERPL-MCNC: 1.5 MG/DL (ref 1.6–2.3)
MCH RBC QN AUTO: 29.2 PG (ref 26.5–33)
MCHC RBC AUTO-ENTMCNC: 32.8 G/DL (ref 31.5–36.5)
MCV RBC AUTO: 89 FL (ref 78–100)
MONOCYTES # BLD AUTO: 0.8 10E9/L (ref 0–1.3)
MONOCYTES NFR BLD AUTO: 12 %
NEUTROPHILS # BLD AUTO: 4.9 10E9/L (ref 1.6–8.3)
NEUTROPHILS NFR BLD AUTO: 71.7 %
NRBC # BLD AUTO: 0 10*3/UL
NRBC BLD AUTO-RTO: 0 /100
NT-PROBNP SERPL-MCNC: 814 PG/ML (ref 0–1800)
OPIATES UR QL SCN: NEGATIVE
PLATELET # BLD AUTO: 288 10E9/L (ref 150–450)
POTASSIUM SERPL-SCNC: 3.9 MMOL/L (ref 3.4–5.3)
POTASSIUM SERPL-SCNC: 3.9 MMOL/L (ref 3.4–5.3)
PROT SERPL-MCNC: 7.3 G/DL (ref 6.8–8.8)
RBC # BLD AUTO: 3.46 10E12/L (ref 3.8–5.2)
RSV RNA SPEC QL NAA+PROBE: NORMAL
SARS-COV-2 RNA RESP QL NAA+PROBE: NEGATIVE
SODIUM SERPL-SCNC: 135 MMOL/L (ref 133–144)
SPECIMEN SOURCE: NORMAL
TROPONIN I SERPL-MCNC: <0.015 UG/L (ref 0–0.04)
WBC # BLD AUTO: 6.9 10E9/L (ref 4–11)

## 2021-03-23 PROCEDURE — 83880 ASSAY OF NATRIURETIC PEPTIDE: CPT | Performed by: EMERGENCY MEDICINE

## 2021-03-23 PROCEDURE — 96361 HYDRATE IV INFUSION ADD-ON: CPT | Performed by: EMERGENCY MEDICINE

## 2021-03-23 PROCEDURE — 93010 ELECTROCARDIOGRAM REPORT: CPT | Performed by: EMERGENCY MEDICINE

## 2021-03-23 PROCEDURE — 90791 PSYCH DIAGNOSTIC EVALUATION: CPT

## 2021-03-23 PROCEDURE — 120N000002 HC R&B MED SURG/OB UMMC

## 2021-03-23 PROCEDURE — 80053 COMPREHEN METABOLIC PANEL: CPT | Performed by: EMERGENCY MEDICINE

## 2021-03-23 PROCEDURE — 71046 X-RAY EXAM CHEST 2 VIEWS: CPT

## 2021-03-23 PROCEDURE — 99207 PR CDG-HISTORY COMP: MEETS EXP. PROBLEM FOCUSED - DOWN CODED LACK OF HPI: CPT | Performed by: INTERNAL MEDICINE

## 2021-03-23 PROCEDURE — 93005 ELECTROCARDIOGRAM TRACING: CPT | Performed by: EMERGENCY MEDICINE

## 2021-03-23 PROCEDURE — 258N000003 HC RX IP 258 OP 636: Performed by: EMERGENCY MEDICINE

## 2021-03-23 PROCEDURE — 80320 DRUG SCREEN QUANTALCOHOLS: CPT | Performed by: EMERGENCY MEDICINE

## 2021-03-23 PROCEDURE — C9803 HOPD COVID-19 SPEC COLLECT: HCPCS | Performed by: EMERGENCY MEDICINE

## 2021-03-23 PROCEDURE — 99285 EMERGENCY DEPT VISIT HI MDM: CPT | Mod: 25 | Performed by: EMERGENCY MEDICINE

## 2021-03-23 PROCEDURE — 83735 ASSAY OF MAGNESIUM: CPT | Performed by: INTERNAL MEDICINE

## 2021-03-23 PROCEDURE — 250N000013 HC RX MED GY IP 250 OP 250 PS 637: Performed by: EMERGENCY MEDICINE

## 2021-03-23 PROCEDURE — HZ2ZZZZ DETOXIFICATION SERVICES FOR SUBSTANCE ABUSE TREATMENT: ICD-10-PCS | Performed by: INTERNAL MEDICINE

## 2021-03-23 PROCEDURE — 87636 SARSCOV2 & INF A&B AMP PRB: CPT | Performed by: EMERGENCY MEDICINE

## 2021-03-23 PROCEDURE — 85025 COMPLETE CBC W/AUTO DIFF WBC: CPT | Performed by: EMERGENCY MEDICINE

## 2021-03-23 PROCEDURE — 84132 ASSAY OF SERUM POTASSIUM: CPT | Performed by: INTERNAL MEDICINE

## 2021-03-23 PROCEDURE — 99233 SBSQ HOSP IP/OBS HIGH 50: CPT | Performed by: INTERNAL MEDICINE

## 2021-03-23 PROCEDURE — 80307 DRUG TEST PRSMV CHEM ANLYZR: CPT | Performed by: EMERGENCY MEDICINE

## 2021-03-23 PROCEDURE — 84484 ASSAY OF TROPONIN QUANT: CPT | Performed by: EMERGENCY MEDICINE

## 2021-03-23 PROCEDURE — 36415 COLL VENOUS BLD VENIPUNCTURE: CPT | Performed by: INTERNAL MEDICINE

## 2021-03-23 PROCEDURE — 96374 THER/PROPH/DIAG INJ IV PUSH: CPT | Performed by: EMERGENCY MEDICINE

## 2021-03-23 PROCEDURE — 250N000011 HC RX IP 250 OP 636: Performed by: EMERGENCY MEDICINE

## 2021-03-23 PROCEDURE — 250N000013 HC RX MED GY IP 250 OP 250 PS 637: Performed by: INTERNAL MEDICINE

## 2021-03-23 RX ORDER — ASPIRIN 81 MG/1
81 TABLET ORAL DAILY
Status: DISCONTINUED | OUTPATIENT
Start: 2021-03-24 | End: 2021-03-29 | Stop reason: HOSPADM

## 2021-03-23 RX ORDER — GABAPENTIN 300 MG/1
300 CAPSULE ORAL 3 TIMES DAILY
Status: DISCONTINUED | OUTPATIENT
Start: 2021-03-23 | End: 2021-03-29 | Stop reason: HOSPADM

## 2021-03-23 RX ORDER — BUPROPION HYDROCHLORIDE 150 MG/1
150 TABLET ORAL EVERY MORNING
Status: DISCONTINUED | OUTPATIENT
Start: 2021-03-24 | End: 2021-03-24

## 2021-03-23 RX ORDER — LIDOCAINE 40 MG/G
CREAM TOPICAL
Status: DISCONTINUED | OUTPATIENT
Start: 2021-03-23 | End: 2021-03-29 | Stop reason: HOSPADM

## 2021-03-23 RX ORDER — SODIUM CHLORIDE 9 MG/ML
INJECTION, SOLUTION INTRAVENOUS CONTINUOUS
Status: DISCONTINUED | OUTPATIENT
Start: 2021-03-23 | End: 2021-03-23

## 2021-03-23 RX ORDER — ONDANSETRON 2 MG/ML
4 INJECTION INTRAMUSCULAR; INTRAVENOUS EVERY 6 HOURS PRN
Status: DISCONTINUED | OUTPATIENT
Start: 2021-03-23 | End: 2021-03-29 | Stop reason: HOSPADM

## 2021-03-23 RX ORDER — DIAZEPAM 5 MG
5-20 TABLET ORAL EVERY 30 MIN PRN
Status: DISCONTINUED | OUTPATIENT
Start: 2021-03-23 | End: 2021-03-28

## 2021-03-23 RX ORDER — LORAZEPAM 1 MG/1
2 TABLET ORAL ONCE
Status: COMPLETED | OUTPATIENT
Start: 2021-03-23 | End: 2021-03-23

## 2021-03-23 RX ORDER — FUROSEMIDE 10 MG/ML
20 INJECTION INTRAMUSCULAR; INTRAVENOUS ONCE
Status: COMPLETED | OUTPATIENT
Start: 2021-03-23 | End: 2021-03-23

## 2021-03-23 RX ORDER — MULTIPLE VITAMINS W/ MINERALS TAB 9MG-400MCG
1 TAB ORAL DAILY
Status: DISCONTINUED | OUTPATIENT
Start: 2021-03-24 | End: 2021-03-29 | Stop reason: HOSPADM

## 2021-03-23 RX ORDER — ATENOLOL 25 MG/1
25 TABLET ORAL DAILY
Status: DISCONTINUED | OUTPATIENT
Start: 2021-03-24 | End: 2021-03-27

## 2021-03-23 RX ORDER — FOLIC ACID 1 MG/1
1 TABLET ORAL DAILY
Status: DISCONTINUED | OUTPATIENT
Start: 2021-03-24 | End: 2021-03-29 | Stop reason: HOSPADM

## 2021-03-23 RX ORDER — LANOLIN ALCOHOL/MO/W.PET/CERES
100 CREAM (GRAM) TOPICAL DAILY
Status: DISCONTINUED | OUTPATIENT
Start: 2021-03-24 | End: 2021-03-29 | Stop reason: HOSPADM

## 2021-03-23 RX ORDER — CITALOPRAM HYDROBROMIDE 20 MG/1
20 TABLET ORAL DAILY
Status: DISCONTINUED | OUTPATIENT
Start: 2021-03-24 | End: 2021-03-29 | Stop reason: HOSPADM

## 2021-03-23 RX ORDER — ONDANSETRON 4 MG/1
4 TABLET, ORALLY DISINTEGRATING ORAL EVERY 6 HOURS PRN
Status: DISCONTINUED | OUTPATIENT
Start: 2021-03-23 | End: 2021-03-29 | Stop reason: HOSPADM

## 2021-03-23 RX ADMIN — SODIUM CHLORIDE: 9 INJECTION, SOLUTION INTRAVENOUS at 19:19

## 2021-03-23 RX ADMIN — GABAPENTIN 300 MG: 300 CAPSULE ORAL at 22:24

## 2021-03-23 RX ADMIN — DIAZEPAM 5 MG: 5 TABLET ORAL at 22:24

## 2021-03-23 RX ADMIN — FUROSEMIDE 20 MG: 10 INJECTION, SOLUTION INTRAMUSCULAR; INTRAVENOUS at 19:15

## 2021-03-23 RX ADMIN — SODIUM CHLORIDE 250 ML: 9 INJECTION, SOLUTION INTRAVENOUS at 18:55

## 2021-03-23 RX ADMIN — LORAZEPAM 2 MG: 1 TABLET ORAL at 17:17

## 2021-03-23 ASSESSMENT — ENCOUNTER SYMPTOMS
FEVER: 0
NAUSEA: 0
VOMITING: 0
CHOKING: 0
CONSTIPATION: 0
FATIGUE: 0
SHORTNESS OF BREATH: 0
DYSPHORIC MOOD: 0

## 2021-03-23 NOTE — LETTER
Health Information Management Services               Recipient:  [address] [place]          Sender: Carli Ureña, , ph: [phone]          Date: March 29, 2021  Patient Name:  Kavitha Headley  Routing Message:  Face sheet with insurance info, let me know if you need anything else :)           The documents accompanying this notice contain confidential information belonging to the sender.  This information is intended only for the use of the individual or entity named above.  The authorized recipient of this information is prohibited from disclosing this information to any other party and is required to destroy the information after its stated need has been fulfilled, unless otherwise required by state law.      If you are not the intended recipient, you are hereby notified that any disclosure, copy, distribution or action taken in reliance on the contents of these documents is strictly prohibited.  If you have received this document in error, please return it by fax to [phone] with a note on the cover sheet explaining why you are returning it (e.g. not your patient, not your provider, etc.).  If you need further assistance, please call [facility] Centralized Transcription at [phone].  Documents may also be returned by mail to [employer], , [facility] [address], [place], [place] [address].

## 2021-03-23 NOTE — ED NOTES
Bed: ED11  Expected date:   Expected time:   Means of arrival:   Comments:  JORDANA [id number]  74 yr old etoh

## 2021-03-23 NOTE — ED PROVIDER NOTES
[facility] - [place], [place] EMERGENCY DEPARTMENT ([facility])     March 23, 2021    History     Chief Complaint   Patient presents with     Alcohol Problem     daughter called 911 for unable to rouse pt this am; pt roused to gently shaking per EMS. Dtr says pt is taking too many of her rx meds-not taking as prescribed and drinking heavily for past week.     Suicidal     pt says she is not sure if she is taking too many pills to kill herself or not; says she feels hopeless and has no will to live     HPI  Kavitha Headley is a 75 year old female with a past medical history significant for alcohol abuse, alcoholic cirrhosis of liver, depression, anxiety, CAD, CKD stage III, HTN, and GERD who presents here to the Emergency Department via EMS for evaluation of alcohol use and suicidal ideation.  Patient was brought to the emergency department by the her daughter who is current concerned over the patient today.  Patient's been drinking excess amount of alcohol for the past several weeks.  She does have a history of alcohol abuse and last went through detox about 1 year prior.  Patient says that recently she is been drinking 8-10 drinks of demetrice every day.  Patient says that last night she ended up taking increased doses of baclofen and Benadryl he was trying to cut down at home which alcohol she is drinking.  Patient's daughter came to her house today and saw a bunch of pills scattered on the floor was unable to arouse the patient like normal so she called an ambulance.  Patient says that she remembers the ambulance coming to pick her up.  Patient's typically starts drinking at around 7 PM and drinks to about 2 or 3 in the morning.  Patient generally is in bed sleeping till early afternoon around 1 or 2.  Family has been increasingly more concerned about her because she is drinking excessively and not keep taking care of her activities of daily living.  She is unable to get up and move around like she normally does when she is  drinking excessively.  Daughter says that when she is not drinking she is able to take care of herself appropriately.  Patient denies any chest pain or shortness of breath.  She said that she did follow-up with her cardiologist earlier this week and was started on medication.  Patient does note chronic back pain but denies any other pain.      PAST MEDICAL HISTORY:   Past Medical History:   Diagnosis Date     Alcohol abuse     Long term alcohol abuse. Abstinenet since October 2019.     Anxiety disorder      Ascending aorta dilatation (H)      Bariatric surgery status 1996?    gastric bypass, [facility] and     Benign hypertension      Chronic insomnia      Chronic pain syndrome     Chronic back and neck pain, chronic pain due to osteoarthritis multiple joints     Coronary artery disease involving native coronary artery of native heart without angina pectoris 10/16/2018    Minimal coronary artery disease on coronary angiogram in 2015.      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     Major depressive disorder, single episode, severe, without mention of psychotic behavior      Mixed hyperlipidemia      Moderate aortic stenosis 05/2014    moderate to severe aortic valve stenosis     Pelvic relaxation disorder     Surgical intervention for cystocele/rectocele 3,11/2012     Personal history of urinary calculi 6/2006    left ureteral stone,lithotripsy     Psoriasis      PVC (premature ventricular contraction)      Spinal stenosis      Stage III chronic kidney disease 2005     SVT (supraventricular tachycardia) (H)     likely atrial tachycardia       PAST SURGICAL HISTORY:   Past Surgical History:   Procedure Laterality Date     APPENDECTOMY  3/2004    incidental      ARTHRODESIS TOE(S) Right 1/31/2020    Procedure: RIGHT FIRST METATARSAL PHALANGEAL JOINT ARTHRODESIS;  Surgeon: Steven Reyes MD;  Location:  OR     C GASTRIC BYPASS,OBESE<100CM ARIANNA-EN-Y  1996     C MEDIASTINOSCOPY W OR WO BIOPSY  2/2008    Videomediastinoscopy and, for mediastinal adenopathy -reactive lymphoid hyperplasia     C REPAIR OF RECTOCELE  3/2012     C TOTAL KNEE ARTHROPLASTY  12/2005    left      CARPAL TUNNEL RELEASE RT/LT  10/2010    Carpometacarpal excisional arthroplasty with a fascial autograft and APL suspension sling (18297). 2. Left thumb metacarpophalangeal joint fusion with autologous bone graft (29652). 3. Left endoscopic carpal tunnel release      CHOLECYSTECTOMY, LAPOROSCOPIC  11/2010    Cholecystectomy, Laparoscopic     COLONOSCOPY N/A 9/8/2016    Procedure: COMBINED COLONOSCOPY, SINGLE OR MULTIPLE BIOPSY/POLYPECTOMY BY BIOPSY;  Surgeon: Moe Barlow MD;  Location:  GI     CYSTOCELE REPAIR  11/2012    davinci laparoscopic sacrocolpopexy, enterocele repair, lysis of adhesions, placement of retropubic mid urethral sling, cystoscopy     CYSTOSCOPY, LITHOTRIPSY, COMBINED  6/2006    Left extracorporeal shock wave lithotripsy, cystoscopy, left ureteral stent placement.     CYSTOSCOPY, REMOVE STENT(S), COMBINED  7/2006    Cystoscopy, removal of left ureteral stent, retrograde pyelography, flexible and rigid ureteroscopy and holmium laser lithotripsy, basket removal of stone fragments, ureteral stent placement.      ENDOSCOPIC ULTRASOUND UPPER GASTROINTESTINAL TRACT (GI) N/A 6/12/2017    Procedure: ENDOSCOPIC ULTRASOUND, ESOPHAGOSCOPY / UPPER GASTROINTESTINAL TRACT (GI);  ENDOSCOPIC ULTRASOUND, ESOPHAGOSCOPY / UPPER GASTROINTESTINAL TRACT (GI);  Surgeon: Parth Graham MD;  Location:  GI     HERNIA REPAIR  4/2012    bilateral augmentation mastopexy, ventral hernia repair, and medial thigh liposuction on 04/06/2012.      HYSTERECTOMY VAGINAL, BILATERAL SALPINGO-OOPHERECTOMY,  "COMBINED  1998    due to myoma and bleeding     JOINT REPLACEMENT, HIP RT/LT  4/2004    right total hip arthroplasty     LAPAROTOMY, LYSIS ADHESIONS, COMBINED  3/2004    lysis adhesions, ventral hernia repair, appendectomy incidentally     LYMPH NODE BIOPSY  4/2008    right axillary, reactive follicular and paracortical hyperplasia.     MAMMOPLASTY AUGMENTATION BILATERAL  4/2012     REPAIR HAMMER TOE Right 1/31/2020    Procedure: WITH SECOND AND THIRD CLAW TOE RECONSTRUCTION;  Surgeon: Steven Reyes MD;  Location: [facility] OR     REVISE RECONSTRUCTED BREAST  6/7/2012    Left breast capsulotomy.        Past medical history, past surgical history, medications, and allergies were reviewed with the patient. Additional pertinent items: None    FAMILY HISTORY:   Family History   Problem Relation Age of Onset     Substance Abuse Father      Cancer Father         throat and lung mets     Diabetes No family hx of      Coronary Artery Disease No family hx of      Cerebrovascular Disease No family hx of        SOCIAL HISTORY:   Social History     Tobacco Use     Smoking status: Never Smoker     Smokeless tobacco: Never Used   Substance Use Topics     Alcohol use: Yes     Alcohol/week: 63.0 standard drinks     Types: 63 Standard drinks or equivalent per week     Comment: pt says she drinks \"6oz\" per day; daughter says pt is minimizing amount     Social history was reviewed with the patient. Additional pertinent items: None      Patient's Medications   New Prescriptions    No medications on file   Previous Medications    ASPIRIN 81 MG EC TABLET    Take 81 mg by mouth daily    ASPIRIN-ACETAMINOPHEN-CAFFEINE (EXCEDRIN MIGRAINE) 250-250-65 MG TABLET    Take 2 tablets by mouth daily as needed for headaches    ATENOLOL (TENORMIN) 25 MG TABLET    Take 1 tablet (25 mg) by mouth daily    BACLOFEN (LIORESAL) 10 MG TABLET    Take 1 tablet (10 mg) by mouth 3 times daily    BIOTIN PO    Take 1 tablet by mouth every morning    BUPROPION " (WELLBUTRIN XL) 150 MG 24 HR TABLET    Take 1 tablet (150 mg) by mouth every morning    CITALOPRAM (CELEXA) 20 MG TABLET    Take 1 tablet (20 mg) by mouth daily    FOLIC ACID (FOLVITE) 1 MG TABLET    Take 1 mg by mouth daily    FUROSEMIDE (LASIX) 20 MG TABLET    Take 1 tablet (20 mg) by mouth daily TAKE 1 TABLET DAILY    GABAPENTIN (NEURONTIN) 300 MG CAPSULE    Take 1 capsule (300 mg) by mouth 3 times daily    HYDROXYZINE (ATARAX) 25 MG TABLET    Take 1 tablet (25 mg) by mouth every 6 hours    LOSARTAN (COZAAR) 25 MG TABLET    Take 1 tablet (25 mg) by mouth every morning    MIRTAZAPINE (REMERON) 15 MG TABLET    Take 1 tablet (15 mg) by mouth At Bedtime    MULTIPLE VITAMINS-MINERALS (CENTRUM SILVER) PER TABLET    Take 1 tablet by mouth daily    POLYETHYLENE GLYCOL (MIRALAX/GLYCOLAX) PACKET    Take 17 g by mouth daily as needed (constipation)    SENNA-DOCUSATE (SENOKOT-S;PERICOLACE) 8.6-50 MG PER TABLET    Take 1-2 tablets by mouth 2 times daily as needed for constipation    TRAZODONE (DESYREL) 100 MG TABLET    Take 1 tablet (100 mg) by mouth At Bedtime    VALACYCLOVIR (VALTREX) 1000 MG TABLET    TAKE 2 TABS BY MOUTH EVERY 12 HOURS FOR 1 DAY ONLY FOR OUTBREAKS OF COLD SORES AS NEEDED    VITAMIN C (ASCORBIC ACID) 250 MG TABLET    Take 250 mg by mouth daily   Modified Medications    No medications on file   Discontinued Medications    No medications on file          Allergies   Allergen Reactions     Bactrim [Sulfamethoxazole W/Trimethoprim] Hives     Codeine Itching     NAUSEA     Morphine Itching     NAUSEA        Review of Systems   Constitutional: Negative for fatigue and fever.   Respiratory: Negative for choking and shortness of breath.    Gastrointestinal: Negative for constipation, nausea and vomiting.   Psychiatric/Behavioral: Negative for dysphoric mood and self-injury.     A complete review of systems was performed with pertinent positives and negatives noted in the HPI, and all other systems  negative.    Physical Exam   BP: (!) 131/106  Pulse: 91  SpO2: 94 %      Physical Exam  Constitutional:       General: She is not in acute distress.     Appearance: She is well-developed. She is not toxic-appearing.      Comments: Disheveled; slows in answering question;s + resting tremor   HENT:      Head: Normocephalic and atraumatic.   Neck:      Musculoskeletal: Normal range of motion.   Cardiovascular:      Rate and Rhythm: Normal rate and regular rhythm.      Pulses: Normal pulses.      Heart sounds: Normal heart sounds.   Pulmonary:      Effort: Pulmonary effort is normal. No respiratory distress.      Breath sounds: Normal breath sounds.      Comments: sats 91-94%  Abdominal:      General: There is no distension.      Palpations: Abdomen is soft.      Tenderness: There is no abdominal tenderness. There is no rebound.   Musculoskeletal:         General: No tenderness.   Skin:     General: Skin is warm and dry.      Coloration: Skin is not jaundiced.   Neurological:      General: No focal deficit present.      Mental Status: She is alert and oriented to person, place, and time.      Cranial Nerves: No cranial nerve deficit.      Motor: Weakness present.   Psychiatric:         Behavior: Behavior normal.         Thought Content: Thought content normal.         ED Course        Procedures             EKG Interpretation:      Interpreted by Viviana Macario MD  Time reviewed: 1636  Symptoms at time of EKG: none   Rhythm: normal sinus   Rate: normal  Axis: left  Ectopy: none  Conduction: normal  ST Segments/ T Waves: No ST-T wave changes  Q Waves: none  Comparison to prior: Unchanged    Clinical Impression: normal EKG      Results for orders placed or performed during the hospital encounter of 03/23/21   XR Chest 2 Views     Status: None    Narrative    CHEST TWO VIEWS 3/23/2021 5:47 PM     HISTORY: SOB.    COMPARISON: 2/13/2020    FINDINGS: The heart is enlarged. There is pulmonary vascular  prominence but  no interstitial edema or pleural effusion. No  pneumothorax or significant airspace consolidation.       Impression    IMPRESSION: Cardiomegaly and vascular prominence suggestive of fluid  overload/early CHF.     KLELEY MOORE MD   CBC with platelets differential     Status: Abnormal   Result Value Ref Range    WBC 6.9 4.0 - 11.0 10e9/L    RBC Count 3.46 (L) 3.8 - 5.2 10e12/L    Hemoglobin 10.1 (L) 11.7 - 15.7 g/dL    Hematocrit 30.8 (L) 35.0 - 47.0 %    MCV 89 78 - 100 fl    MCH 29.2 26.5 - 33.0 pg    MCHC 32.8 31.5 - 36.5 g/dL    RDW 15.0 10.0 - 15.0 %    Platelet Count 288 150 - 450 10e9/L    Diff Method Automated Method     % Neutrophils 71.7 %    % Lymphocytes 15.3 %    % Monocytes 12.0 %    % Eosinophils 0.4 %    % Basophils 0.3 %    % Immature Granulocytes 0.3 %    Nucleated RBCs 0 0 /100    Absolute Neutrophil 4.9 1.6 - 8.3 10e9/L    Absolute Lymphocytes 1.1 0.8 - 5.3 10e9/L    Absolute Monocytes 0.8 0.0 - 1.3 10e9/L    Absolute Eosinophils 0.0 0.0 - 0.7 10e9/L    Absolute Basophils 0.0 0.0 - 0.2 10e9/L    Abs Immature Granulocytes 0.0 0 - 0.4 10e9/L    Absolute Nucleated RBC 0.0    Comprehensive metabolic panel     Status: Abnormal   Result Value Ref Range    Sodium 135 133 - 144 mmol/L    Potassium 3.9 3.4 - 5.3 mmol/L    Chloride 98 94 - 109 mmol/L    Carbon Dioxide 28 20 - 32 mmol/L    Anion Gap 9 3 - 14 mmol/L    Glucose 114 (H) 70 - 99 mg/dL    Urea Nitrogen 23 7 - 30 mg/dL    Creatinine 1.22 (H) 0.52 - 1.04 mg/dL    GFR Estimate 43 (L) >60 mL/min/[1.73_m2]    GFR Estimate If Black 49 (L) >60 mL/min/[1.73_m2]    Calcium 8.8 8.5 - 10.1 mg/dL    Bilirubin Total 0.3 0.2 - 1.3 mg/dL    Albumin 3.6 3.4 - 5.0 g/dL    Protein Total 7.3 6.8 - 8.8 g/dL    Alkaline Phosphatase 63 40 - 150 U/L    ALT 14 0 - 50 U/L    AST 11 0 - 45 U/L   Troponin I     Status: None   Result Value Ref Range    Troponin I ES <0.015 0.000 - 0.045 ug/L   Drug abuse screen 6 urine (chem dep)     Status: None   Result Value Ref Range     Amphetamine Qual Urine Negative NEG^Negative    Barbiturates Qual Urine Negative NEG^Negative    Benzodiazepine Qual Urine Negative NEG^Negative    Cannabinoids Qual Urine Negative NEG^Negative    Cocaine Qual Urine Negative NEG^Negative    Ethanol Qual Urine Negative NEG^Negative    Opiates Qualitative Urine Negative NEG^Negative   Alcohol breath test POCT     Status: Normal   Result Value Ref Range    Alcohol Breath Test 0.00 0.00 - 0.01     Medications   0.9% sodium chloride BOLUS (has no administration in time range)     Followed by   sodium chloride 0.9% infusion (has no administration in time range)   furosemide (LASIX) injection 20 mg (has no administration in time range)   LORazepam (ATIVAN) tablet 2 mg (2 mg Oral Given 3/23/21 1717)                       Results for orders placed or performed during the hospital encounter of 03/23/21 (from the past 24 hour(s))   Alcohol breath test POCT   Result Value Ref Range    Alcohol Breath Test 0.00 0.00 - 0.01     *Note: Due to a large number of results and/or encounters for the requested time period, some results have not been displayed. A complete set of results can be found in Results Review.     Medications - No data to display          Assessments & Plan (with Medical Decision Making)   Patient is a 75-year-old female that was brought to the ER by her daughter due to concern for alcohol abuse.  Please see the behavioral health note for full details.  Patient has been drinking excessively and has been taking medications at home to help her stop get off the alcohol.  Patient however is unable to take care of herself well at home has been sleeping excessively.  I saw the patient here she was awake and alert and was telling me that she wants help to stop drinking.  Patient denied any SI or HI.  Plan was initially to admit the patient to a CD bed but patient is less mobile now that she is withdrawing from alcohol is unable to get up and walk around by herself.   Patient will therefore be admitted to the internal medicine service here at [facility] for treatment of alcohol withdrawal.  Patient was given 2 mg of oral Ativan.  Patient does have mildly low oxygen levels in the low 90s.  I did obtain a chest x-ray that shows mild CHF.  Patient will be given 1 dose of IV Lasix.  Case was discussed with the internal medicine hospitalist.  Patient will be admitted for further care.  Patient's labs and chest x-ray were reviewed by me personally.    I have reviewed the nursing notes.    I have reviewed the findings, diagnosis, plan and need for follow up with the patient.    New Prescriptions    No medications on file       Final diagnoses:   Alcohol withdrawal syndrome without complication (H)   Generalized muscle weakness     --  Viviana Macario MD  3/23/2021   [facility] EMERGENCY DEPARTMENT     Viviana Macario MD  03/23/21 2845

## 2021-03-24 LAB
ALBUMIN SERPL-MCNC: 3.2 G/DL (ref 3.4–5)
ALBUMIN UR-MCNC: NEGATIVE MG/DL
ALP SERPL-CCNC: 59 U/L (ref 40–150)
ALT SERPL W P-5'-P-CCNC: 13 U/L (ref 0–50)
ANION GAP SERPL CALCULATED.3IONS-SCNC: 5 MMOL/L (ref 3–14)
APPEARANCE UR: ABNORMAL
AST SERPL W P-5'-P-CCNC: 12 U/L (ref 0–45)
BACTERIA #/AREA URNS HPF: ABNORMAL /HPF
BILIRUB DIRECT SERPL-MCNC: 0.1 MG/DL (ref 0–0.2)
BILIRUB SERPL-MCNC: 0.3 MG/DL (ref 0.2–1.3)
BILIRUB UR QL STRIP: NEGATIVE
BUN SERPL-MCNC: 20 MG/DL (ref 7–30)
CALCIUM SERPL-MCNC: 8.6 MG/DL (ref 8.5–10.1)
CHLORIDE SERPL-SCNC: 101 MMOL/L (ref 94–109)
CO2 SERPL-SCNC: 30 MMOL/L (ref 20–32)
COLOR UR AUTO: ABNORMAL
CREAT SERPL-MCNC: 1.15 MG/DL (ref 0.52–1.04)
ERYTHROCYTE [DISTWIDTH] IN BLOOD BY AUTOMATED COUNT: 15.2 % (ref 10–15)
GFR SERPL CREATININE-BSD FRML MDRD: 46 ML/MIN/{1.73_M2}
GLUCOSE SERPL-MCNC: 96 MG/DL (ref 70–99)
GLUCOSE UR STRIP-MCNC: NEGATIVE MG/DL
HCT VFR BLD AUTO: 28.6 % (ref 35–47)
HGB BLD-MCNC: 9.4 G/DL (ref 11.7–15.7)
HGB UR QL STRIP: NEGATIVE
HYALINE CASTS #/AREA URNS LPF: 10 /LPF (ref 0–2)
INTERPRETATION ECG - MUSE: NORMAL
KETONES UR STRIP-MCNC: NEGATIVE MG/DL
LEUKOCYTE ESTERASE UR QL STRIP: ABNORMAL
MAGNESIUM SERPL-MCNC: 2.2 MG/DL (ref 1.6–2.3)
MCH RBC QN AUTO: 29.4 PG (ref 26.5–33)
MCHC RBC AUTO-ENTMCNC: 32.9 G/DL (ref 31.5–36.5)
MCV RBC AUTO: 89 FL (ref 78–100)
MUCOUS THREADS #/AREA URNS LPF: PRESENT /LPF
NITRATE UR QL: NEGATIVE
PH UR STRIP: 5.5 PH (ref 5–7)
PLATELET # BLD AUTO: 271 10E9/L (ref 150–450)
POTASSIUM SERPL-SCNC: 3.6 MMOL/L (ref 3.4–5.3)
PROT SERPL-MCNC: 6.7 G/DL (ref 6.8–8.8)
RBC # BLD AUTO: 3.2 10E12/L (ref 3.8–5.2)
RBC #/AREA URNS AUTO: 3 /HPF (ref 0–2)
RENAL EPI CELLS #/AREA URNS HPF: 3 /HPF
SODIUM SERPL-SCNC: 136 MMOL/L (ref 133–144)
SOURCE: ABNORMAL
SP GR UR STRIP: 1.01 (ref 1–1.03)
SQUAMOUS #/AREA URNS AUTO: 14 /HPF (ref 0–1)
UROBILINOGEN UR STRIP-MCNC: NORMAL MG/DL (ref 0–2)
WBC # BLD AUTO: 5.6 10E9/L (ref 4–11)
WBC #/AREA URNS AUTO: 86 /HPF (ref 0–5)

## 2021-03-24 PROCEDURE — 81001 URINALYSIS AUTO W/SCOPE: CPT | Performed by: EMERGENCY MEDICINE

## 2021-03-24 PROCEDURE — 250N000013 HC RX MED GY IP 250 OP 250 PS 637: Performed by: INTERNAL MEDICINE

## 2021-03-24 PROCEDURE — 36415 COLL VENOUS BLD VENIPUNCTURE: CPT | Performed by: INTERNAL MEDICINE

## 2021-03-24 PROCEDURE — 85027 COMPLETE CBC AUTOMATED: CPT | Performed by: INTERNAL MEDICINE

## 2021-03-24 PROCEDURE — 250N000011 HC RX IP 250 OP 636: Performed by: INTERNAL MEDICINE

## 2021-03-24 PROCEDURE — 250N000013 HC RX MED GY IP 250 OP 250 PS 637: Performed by: STUDENT IN AN ORGANIZED HEALTH CARE EDUCATION/TRAINING PROGRAM

## 2021-03-24 PROCEDURE — 83735 ASSAY OF MAGNESIUM: CPT | Performed by: INTERNAL MEDICINE

## 2021-03-24 PROCEDURE — 80048 BASIC METABOLIC PNL TOTAL CA: CPT | Performed by: INTERNAL MEDICINE

## 2021-03-24 PROCEDURE — 80076 HEPATIC FUNCTION PANEL: CPT | Performed by: INTERNAL MEDICINE

## 2021-03-24 PROCEDURE — 99207 PR CDG-CUT & PASTE-POTENTIAL IMPACT ON LEVEL: CPT | Performed by: INTERNAL MEDICINE

## 2021-03-24 PROCEDURE — 99232 SBSQ HOSP IP/OBS MODERATE 35: CPT | Performed by: NURSE PRACTITIONER

## 2021-03-24 PROCEDURE — 99233 SBSQ HOSP IP/OBS HIGH 50: CPT | Performed by: INTERNAL MEDICINE

## 2021-03-24 PROCEDURE — 120N000002 HC R&B MED SURG/OB UMMC

## 2021-03-24 RX ORDER — FUROSEMIDE 20 MG
20 TABLET ORAL DAILY
Status: DISCONTINUED | OUTPATIENT
Start: 2021-03-25 | End: 2021-03-29 | Stop reason: HOSPADM

## 2021-03-24 RX ORDER — FUROSEMIDE 10 MG/ML
20 INJECTION INTRAMUSCULAR; INTRAVENOUS ONCE
Status: COMPLETED | OUTPATIENT
Start: 2021-03-24 | End: 2021-03-24

## 2021-03-24 RX ORDER — QUETIAPINE FUMARATE 25 MG/1
25 TABLET, FILM COATED ORAL 2 TIMES DAILY PRN
Status: DISCONTINUED | OUTPATIENT
Start: 2021-03-24 | End: 2021-03-28

## 2021-03-24 RX ORDER — SODIUM CHLORIDE 9 MG/ML
INJECTION, SOLUTION INTRAVENOUS
Status: DISPENSED
Start: 2021-03-24 | End: 2021-03-24

## 2021-03-24 RX ORDER — MAGNESIUM SULFATE HEPTAHYDRATE 40 MG/ML
2 INJECTION, SOLUTION INTRAVENOUS ONCE
Status: COMPLETED | OUTPATIENT
Start: 2021-03-24 | End: 2021-03-24

## 2021-03-24 RX ORDER — MIRTAZAPINE 15 MG/1
15 TABLET, FILM COATED ORAL AT BEDTIME
Status: DISCONTINUED | OUTPATIENT
Start: 2021-03-24 | End: 2021-03-29 | Stop reason: HOSPADM

## 2021-03-24 RX ORDER — TRAZODONE HYDROCHLORIDE 50 MG/1
100 TABLET, FILM COATED ORAL AT BEDTIME
Status: DISCONTINUED | OUTPATIENT
Start: 2021-03-24 | End: 2021-03-29 | Stop reason: HOSPADM

## 2021-03-24 RX ADMIN — TRAZODONE HYDROCHLORIDE 100 MG: 50 TABLET ORAL at 22:15

## 2021-03-24 RX ADMIN — DIAZEPAM 10 MG: 5 TABLET ORAL at 17:14

## 2021-03-24 RX ADMIN — CITALOPRAM HYDROBROMIDE 20 MG: 20 TABLET ORAL at 08:02

## 2021-03-24 RX ADMIN — FUROSEMIDE 20 MG: 10 INJECTION, SOLUTION INTRAMUSCULAR; INTRAVENOUS at 08:06

## 2021-03-24 RX ADMIN — THIAMINE HCL TAB 100 MG 100 MG: 100 TAB at 08:02

## 2021-03-24 RX ADMIN — DIAZEPAM 10 MG: 5 TABLET ORAL at 13:04

## 2021-03-24 RX ADMIN — MIRTAZAPINE 15 MG: 15 TABLET, FILM COATED ORAL at 02:12

## 2021-03-24 RX ADMIN — FOLIC ACID 1 MG: 1 TABLET ORAL at 08:02

## 2021-03-24 RX ADMIN — BUPROPION HYDROCHLORIDE 150 MG: 150 TABLET, EXTENDED RELEASE ORAL at 08:02

## 2021-03-24 RX ADMIN — MULTIPLE VITAMINS W/ MINERALS TAB 1 TABLET: TAB at 08:02

## 2021-03-24 RX ADMIN — GABAPENTIN 300 MG: 300 CAPSULE ORAL at 08:02

## 2021-03-24 RX ADMIN — DIAZEPAM 10 MG: 5 TABLET ORAL at 09:52

## 2021-03-24 RX ADMIN — MIRTAZAPINE 15 MG: 15 TABLET, FILM COATED ORAL at 22:15

## 2021-03-24 RX ADMIN — GABAPENTIN 300 MG: 300 CAPSULE ORAL at 20:29

## 2021-03-24 RX ADMIN — DIAZEPAM 5 MG: 5 TABLET ORAL at 08:15

## 2021-03-24 RX ADMIN — ASPIRIN 81 MG: 81 TABLET, COATED ORAL at 08:14

## 2021-03-24 RX ADMIN — MAGNESIUM SULFATE 2 G: 2 INJECTION INTRAVENOUS at 02:12

## 2021-03-24 RX ADMIN — ATENOLOL 25 MG: 25 TABLET ORAL at 08:02

## 2021-03-24 RX ADMIN — DIAZEPAM 5 MG: 5 TABLET ORAL at 01:15

## 2021-03-24 RX ADMIN — GABAPENTIN 300 MG: 300 CAPSULE ORAL at 14:32

## 2021-03-24 ASSESSMENT — MIFFLIN-ST. JEOR: SCORE: 1242.02

## 2021-03-24 NOTE — PROGRESS NOTES
[facility]    Medicine Progress Note - Hospitalist Service       Date of Admission:  3/23/2021  Assessment & Plan        75 year old female with a past medical history significant for alcohol abuse, alcoholic cirrhosis of liver, depression, anxiety, CAD, CKD stage III, HTN, severe aortic stenosis, diastolic heart failure  and GERD who is admitted for increasing  of alcohol use ( treatment of withdrawal)  and suicidal ideation.    # Acute Alcohol withdrawal  #Alcohol dependency    -MSSA protocol with valium as needed.   -CD consult.   -Continue on folic acid and multivitamin.   -Continue on thiamine.      # Suicide ideation   -Suicide precautions.  -Sitter at bedside.   -Psychiatry consult.      # Pulmonary edema ?   # Diastolic CHF ?   # Hx of aortic valve stenosis    # CAD   # HTN  -She had a TTE 3/16/21 that showed normal EF. Left ventricular diastolic function is indeterminate. Normal left ventricular wall motion. Moderate to severe valvular aortic stenosis. There is mild to moderate (1-2+) mitral regurgitation. There is mild mitral stenosis.    -She received one dose of IV lasix. Repeat IV lasix this morning   -Telemetry.   -Continue on PTA ASA.   -Continue on PTA Atenolo     # CKD   -Cr 1.2, improved to 1.15  -Repeat IB lasix 20 mg today.  -Continue monitoring renal function and lytes.      # Anxiety   # Depression   -Continue on PTA antidepressants.      # Liver cirrhosis   -Continue monitoring LFT's . Appear within normal limits           Diet: Regular Diet Adult    DVT Prophylaxis: Pneumatic Compression Devices  [place] Catheter: not present  Code Status: Full Code           Disposition Plan   Expected discharge: 2 - 3 days, recommended to inpatinet alcohol treatment Vs detox unit Vs home  once patinet is completed with alcohol withdrawal .  Entered: Pallavi Crowell MD 03/24/2021, 10:11 AM       The patient's care was discussed with the Bedside Nurse,  Care Coordinator/ and Patient.    Pallavi Crowell MD  Hospitalist Service  [facility]  Contact information available via [facility] Paging/Directory    ______________________________________________________________________    Interval History   Patient was up , alert and awake today  She is going through withdrawals  No fever or chills  Wants to be able to eat regular food  Patient denies any intention of self harm  She wants to pursue with treatment  She is unsure if she has been compliant with her medications       Data reviewed today: I reviewed all medications, new labs and imaging results over the last 24 hours. I personally reviewed no images or EKG's today.    Physical Exam   Vital Signs: Temp: 98  F (36.7  C) Temp src: Oral BP: 104/48 Pulse: 67   Resp: 16 SpO2: 99 % O2 Device: Nasal cannula Oxygen Delivery: 2 LPM  Weight: 170 lbs 3.2 oz  General Appearance: Awake, alert and in mild distress  Respiratory: Bilateral crackles heard   Cardiovascular: Regular heart rate. Ejection systolic murmur heard   GI: Soft, non tender. Normal bowel sounds   Skin: No bruising or bleeding   Other: Awake, alert and orientated X 3. Coarse tremors    Data   Recent Labs   Lab 03/24/21  0525 03/23/21  2213 03/23/21  1506   WBC 5.6  --  6.9   HGB 9.4*  --  10.1*   MCV 89  --  89     --  288     --  135   POTASSIUM 3.6 3.9 3.9   CHLORIDE 101  --  98   CO2 30  --  28   BUN 20  --  23   CR 1.15*  --  1.22*   ANIONGAP 5  --  9   BIRGIT 8.6  --  8.8   GLC 96  --  114*   ALBUMIN 3.2*  --  3.6   PROTTOTAL 6.7*  --  7.3   BILITOTAL 0.3  --  0.3   ALKPHOS 59  --  63   ALT 13  --  14   AST 12  --  11   TROPI  --   --  <0.015

## 2021-03-24 NOTE — PROGRESS NOTES
3/24/2021    CD consult acknowledged.  I will complete consult tomorrow once her insurance benefits have been verified for BUBBA tx. Pt had an update in 10/2020, will need a new update.     DAVON Zafar  [email or web address]  [phone]

## 2021-03-24 NOTE — CONSULTS
[facility], [place]   Initial Psychiatric Consult   Consult date: March 24, 2021         Reason for Consult, requesting source:    Suicidal ideation  Requesting source: Per Chan        HPI:   Ms. Kavitha Headley is a 75 year old female who was brought to the emergency department by her daughter on 3/23/21 in the setting of alcohol abuse and unable to arouse patient. Patient drinks 8-10 drinks of demetrice per day, with increasing intake over the last week or more. PMH significant for alcohol use disorder, major depressive disorder, anxiety, alcoholic cirrhosis, CAD, CKD stage III, HTN, and GERD. Psychiatry is consulted to evaluate her suicidal ideation.     Per chart review, daughter found patient down at her home at around 11:30 yesterday morning. She apparently had many pills around her on the floor. Daughter was worried that she may have overdosed, as she has taken too many pills in the past while drinking. She was last seen in the emergency department in August 2020, but there were no detox beds at [facility] or [facility], so she decided to return home. Per DEC crisis evaluation, patient has been struggling for months. Daughter has had to take her to the hospital frequently over the past 10 years for alcohol-related problems. She had been trying to get into residential CD treatment over the past year, but has been unsuccessful due to insurance coverage.     Patient seen in hospital bed today, daughter present at bedside. Daughter reports that she went to go check on her mother yesterday morning, found her unresponsive with pills around her. Daughter says she discovered from patient's boyfriend that she has been dropping pills and then is unable to reach down and pick them up due to her intoxicated state. Patient denies that she intentionally overdoses on any medications. There have been issues in the past where patient has taken too many pills in her state of intoxication.  "Patient reports she generally takes 2 baclofen when she is only prescribed one due to her fear that she will be unable to sleep. She stays awake until the early hours of the morning, then generally sleeps until the afternoon. She has been drinking 8 to 10 drinks of demetrice per day for several years. She has had intermittent periods of sobriety, but not longer than 90 days in the past 20 years. She drinks to help get to sleep but then also drinks to prevent withdrawal symptoms. She denies any history of withdrawal seizures. Reports struggling with anxiety and depression throughout her life, with symptoms worsening when her  passed away about 10 years ago. This was an extremely stressful time for patient,  had lots of medical appointments. 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 Patient very much enjoyed this work - the program was closed down and she was no longer able to continue. Feels she lost a part of herself. No longer has a reason to get up in the morning. Has very little energy, little motivation. She is anhedonic. She does not eat much, fills up after a small portion - tries to eat 2 meals per day. Daughter says the food pt eats is \"crap.\" Daughter recently more concerned as patient has not been coming over for dinner as she and her boyfriend usual do. She was supposed to meet up with her sister on Monday but was not able to do that either. Daughter reports patient has been looking for some sort of miracle cure - buying creams, supplements, etc. Patient has had passive thoughts of death, mainly while she is intoxicated - feels as if she does not care whether she lives or dies. Does not experience these thoughts when sober.     She is currently experiencing withdrawal symptoms, is tremulous, feels uncomfortable, has blurred vision, diplopia, is anxious. In addition, has been seeing " "\"lines\" on the walls.         Past Psychiatric History:   No hx of psychiatric hospitalizations or suicide attempts. She has not seen a psychiatrist over the last year. Does not have an individual therapist, last saw therapist about 20 years ago. Did attend IOP at [facility] and [facility] a couple years ago. Has trialed citalopram, bupropion, Trintellix, quetiapine, mirtazapine, trazodone, gabapentin, hydroxyzine, Ambien, and alprazolam. No hx of ECT or MH commitment.         Substance Use and History:   Was a recreational drinker until her 50s, then alcohol use steadily increased, now drinking 8 to 10 drinks of deemtrice per day. Hx of withdrawal symptoms, hallucinations. No hx of withdrawal seizures. Last seen in ED in August 2020 for withdrawal, no detox beds available. Hx of IOP at [facility] for 10 days in 2018. No hx of residential treatment.     No hx of illicit substance use. Has taken more medication than prescribed - often takes 2 baclofen when only prescribed 1.     Non-smoker        Past Medical History:   PAST MEDICAL HISTORY:   Past Medical History:   Diagnosis Date     Alcohol abuse     Long term alcohol abuse. Abstinenet since October 2019.     Anxiety disorder      Ascending aorta dilatation (H)      Bariatric surgery status 1996?    gastric bypass, [facility] and     Benign hypertension      Chronic insomnia      Chronic pain syndrome     Chronic back and neck pain, chronic pain due to osteoarthritis multiple joints     Coronary artery disease involving native coronary artery of native heart without angina pectoris 10/16/2018    Minimal coronary artery disease on coronary angiogram in 2015.      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     Major depressive " disorder, single episode, severe, without mention of psychotic behavior      Mixed hyperlipidemia      Moderate aortic stenosis 05/2014    moderate to severe aortic valve stenosis     Pelvic relaxation disorder     Surgical intervention for cystocele/rectocele 3,11/2012     Personal history of urinary calculi 6/2006    left ureteral stone,lithotripsy     Psoriasis      PVC (premature ventricular contraction)      Spinal stenosis      Stage III chronic kidney disease 2005     SVT (supraventricular tachycardia) (H)     likely atrial tachycardia       PAST SURGICAL HISTORY:   Past Surgical History:   Procedure Laterality Date     APPENDECTOMY  3/2004    incidental     ARTHRODESIS TOE(S) Right 1/31/2020    Procedure: RIGHT FIRST METATARSAL PHALANGEAL JOINT ARTHRODESIS;  Surgeon: Steven Reyes MD;  Location:  OR      GASTRIC BYPASS,OBESE<100CM ARIANNA-EN-Y  1996     C MEDIASTINOSCOPY W OR WO BIOPSY  2/2008    Videomediastinoscopy and, for mediastinal adenopathy -reactive lymphoid hyperplasia     C REPAIR OF RECTOCELE  3/2012     C TOTAL KNEE ARTHROPLASTY  12/2005    left      CARPAL TUNNEL RELEASE RT/LT  10/2010    Carpometacarpal excisional arthroplasty with a fascial autograft and APL suspension sling (45805). 2. Left thumb metacarpophalangeal joint fusion with autologous bone graft (78043). 3. Left endoscopic carpal tunnel release      CHOLECYSTECTOMY, LAPOROSCOPIC  11/2010    Cholecystectomy, Laparoscopic     COLONOSCOPY N/A 9/8/2016    Procedure: COMBINED COLONOSCOPY, SINGLE OR MULTIPLE BIOPSY/POLYPECTOMY BY BIOPSY;  Surgeon: Moe Barlow MD;  Location:  GI     CYSTOCELE REPAIR  11/2012    davinc laparoscopic sacrocolpopexy, enterocele repair, lysis of adhesions, placement of retropubic mid urethral sling, cystoscopy     CYSTOSCOPY, LITHOTRIPSY, COMBINED  6/2006    Left extracorporeal shock wave lithotripsy, cystoscopy, left ureteral stent placement.     CYSTOSCOPY, REMOVE STENT(S), COMBINED  7/2006     Cystoscopy, removal of left ureteral stent, retrograde pyelography, flexible and rigid ureteroscopy and holmium laser lithotripsy, basket removal of stone fragments, ureteral stent placement.      ENDOSCOPIC ULTRASOUND UPPER GASTROINTESTINAL TRACT (GI) N/A 2017    Procedure: ENDOSCOPIC ULTRASOUND, ESOPHAGOSCOPY / UPPER GASTROINTESTINAL TRACT (GI);  ENDOSCOPIC ULTRASOUND, ESOPHAGOSCOPY / UPPER GASTROINTESTINAL TRACT (GI);  Surgeon: Parth Graham MD;  Location:  GI     HERNIA REPAIR  2012    bilateral augmentation mastopexy, ventral hernia repair, and medial thigh liposuction on 2012.      HYSTERECTOMY VAGINAL, BILATERAL SALPINGO-OOPHERECTOMY, COMBINED      due to myoma and bleeding     JOINT REPLACEMENT, HIP RT/LT  2004    right total hip arthroplasty     LAPAROTOMY, LYSIS ADHESIONS, COMBINED  3/2004    lysis adhesions, ventral hernia repair, appendectomy incidentally     LYMPH NODE BIOPSY  2008    right axillary, reactive follicular and paracortical hyperplasia.     MAMMOPLASTY AUGMENTATION BILATERAL  2012     REPAIR HAMMER TOE Right 2020    Procedure: WITH SECOND AND THIRD CLAW TOE RECONSTRUCTION;  Surgeon: Steven Reyes MD;  Location:  OR     REVISE RECONSTRUCTED BREAST  2012    Left breast capsulotomy.              Family History:   FAMILY HISTORY:   Family History   Problem Relation Age of Onset     Substance Abuse Father      Cancer Father         throat and lung mets     Diabetes No family hx of      Coronary Artery Disease No family hx of      Cerebrovascular Disease No family hx of      Alcohol abuse in father        Social History:   Patient lives alone, although boyfriend stays with her and comes over frequently. Daughter lives nearby and is supportive. 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 Had been doing home health until  when her program was shut down.   a little over 10 years ago from health  complications. Patient has been increasingly depressed since that time.          Physical ROS:   The patient endorsed tremors, feeling physically uncomfortable. The remainder of 10-point review of systems was negative except as noted in HPI.         PTA Medications:     Medications Prior to Admission   Medication Sig Dispense Refill Last Dose     aspirin 81 MG EC tablet Take 81 mg by mouth daily        atenolol (TENORMIN) 25 MG tablet Take 1 tablet (25 mg) by mouth daily 100 tablet 4      baclofen (LIORESAL) 10 MG tablet Take 1 tablet (10 mg) by mouth 3 times daily 90 tablet 1      buPROPion (WELLBUTRIN XL) 150 MG 24 hr tablet Take 1 tablet (150 mg) by mouth every morning 90 tablet 1      citalopram (CELEXA) 20 MG tablet Take 1 tablet (20 mg) by mouth daily 90 tablet 1      folic acid (FOLVITE) 1 MG tablet Take 1 mg by mouth daily        furosemide (LASIX) 20 MG tablet Take 1 tablet (20 mg) by mouth daily TAKE 1 TABLET DAILY 100 tablet 4      gabapentin (NEURONTIN) 300 MG capsule Take 1 capsule (300 mg) by mouth 3 times daily 270 capsule 1      hydrOXYzine (ATARAX) 25 MG tablet Take 1 tablet (25 mg) by mouth every 6 hours 90 tablet 1      losartan (COZAAR) 25 MG tablet Take 1 tablet (25 mg) by mouth every morning 30 tablet 3      mirtazapine (REMERON) 15 MG tablet Take 1 tablet (15 mg) by mouth At Bedtime 90 tablet 1      Multiple Vitamins-Minerals (CENTRUM SILVER) per tablet Take 1 tablet by mouth daily        traZODone (DESYREL) 100 MG tablet Take 1 tablet (100 mg) by mouth At Bedtime 90 tablet 1      valACYclovir (VALTREX) 1000 mg tablet TAKE 2 TABS BY MOUTH EVERY 12 HOURS FOR 1 DAY ONLY FOR OUTBREAKS OF COLD SORES AS NEEDED 4 tablet 3           Allergies:     Allergies   Allergen Reactions     Bactrim [Sulfamethoxazole W/Trimethoprim] Hives     Codeine Itching     NAUSEA     Morphine Itching     NAUSEA          Labs:     Recent Results (from the past 48 hour(s))   Alcohol breath test POCT    Collection Time:  03/23/21  2:49 PM   Result Value Ref Range    Alcohol Breath Test 0.00 0.00 - 0.01   CBC with platelets differential    Collection Time: 03/23/21  3:06 PM   Result Value Ref Range    WBC 6.9 4.0 - 11.0 10e9/L    RBC Count 3.46 (L) 3.8 - 5.2 10e12/L    Hemoglobin 10.1 (L) 11.7 - 15.7 g/dL    Hematocrit 30.8 (L) 35.0 - 47.0 %    MCV 89 78 - 100 fl    MCH 29.2 26.5 - 33.0 pg    MCHC 32.8 31.5 - 36.5 g/dL    RDW 15.0 10.0 - 15.0 %    Platelet Count 288 150 - 450 10e9/L    Diff Method Automated Method     % Neutrophils 71.7 %    % Lymphocytes 15.3 %    % Monocytes 12.0 %    % Eosinophils 0.4 %    % Basophils 0.3 %    % Immature Granulocytes 0.3 %    Nucleated RBCs 0 0 /100    Absolute Neutrophil 4.9 1.6 - 8.3 10e9/L    Absolute Lymphocytes 1.1 0.8 - 5.3 10e9/L    Absolute Monocytes 0.8 0.0 - 1.3 10e9/L    Absolute Eosinophils 0.0 0.0 - 0.7 10e9/L    Absolute Basophils 0.0 0.0 - 0.2 10e9/L    Abs Immature Granulocytes 0.0 0 - 0.4 10e9/L    Absolute Nucleated RBC 0.0    Comprehensive metabolic panel    Collection Time: 03/23/21  3:06 PM   Result Value Ref Range    Sodium 135 133 - 144 mmol/L    Potassium 3.9 3.4 - 5.3 mmol/L    Chloride 98 94 - 109 mmol/L    Carbon Dioxide 28 20 - 32 mmol/L    Anion Gap 9 3 - 14 mmol/L    Glucose 114 (H) 70 - 99 mg/dL    Urea Nitrogen 23 7 - 30 mg/dL    Creatinine 1.22 (H) 0.52 - 1.04 mg/dL    GFR Estimate 43 (L) >60 mL/min/[1.73_m2]    GFR Estimate If Black 49 (L) >60 mL/min/[1.73_m2]    Calcium 8.8 8.5 - 10.1 mg/dL    Bilirubin Total 0.3 0.2 - 1.3 mg/dL    Albumin 3.6 3.4 - 5.0 g/dL    Protein Total 7.3 6.8 - 8.8 g/dL    Alkaline Phosphatase 63 40 - 150 U/L    ALT 14 0 - 50 U/L    AST 11 0 - 45 U/L   Troponin I    Collection Time: 03/23/21  3:06 PM   Result Value Ref Range    Troponin I ES <0.015 0.000 - 0.045 ug/L   Nt probnp inpatient    Collection Time: 03/23/21  3:06 PM   Result Value Ref Range    N-Terminal Pro BNP Inpatient 814 0 - 1,800 pg/mL   EKG 12-lead, tracing only     Collection Time: 03/23/21  4:36 PM   Result Value Ref Range    Interpretation ECG Click View Image link to view waveform and result    Drug abuse screen 6 urine (chem dep)    Collection Time: 03/23/21  5:28 PM   Result Value Ref Range    Amphetamine Qual Urine Negative NEG^Negative    Barbiturates Qual Urine Negative NEG^Negative    Benzodiazepine Qual Urine Negative NEG^Negative    Cannabinoids Qual Urine Negative NEG^Negative    Cocaine Qual Urine Negative NEG^Negative    Ethanol Qual Urine Negative NEG^Negative    Opiates Qualitative Urine Negative NEG^Negative   Asymptomatic Influenza A/B & SARS-CoV2 (COVID-19) Virus PCR Multiplex    Collection Time: 03/23/21  7:02 PM    Specimen: Nasopharyngeal   Result Value Ref Range    Flu A/B & SARS-COV-2 PCR Source Nasopharyngeal     SARS-CoV-2 PCR Result NEGATIVE     Influenza A PCR Negative NEG^Negative    Influenza B PCR Negative NEG^Negative    Respiratory Syncytial Virus PCR (Note)     Flu A/B & SARS-CoV-2 PCR Comment (Note)    Potassium    Collection Time: 03/23/21 10:13 PM   Result Value Ref Range    Potassium 3.9 3.4 - 5.3 mmol/L   Magnesium    Collection Time: 03/23/21 10:13 PM   Result Value Ref Range    Magnesium 1.5 (L) 1.6 - 2.3 mg/dL   UA with Microscopic    Collection Time: 03/24/21 12:25 AM   Result Value Ref Range    Color Urine Light Yellow     Appearance Urine Slightly Cloudy     Glucose Urine Negative NEG^Negative mg/dL    Bilirubin Urine Negative NEG^Negative    Ketones Urine Negative NEG^Negative mg/dL    Specific Gravity Urine 1.008 1.003 - 1.035    Blood Urine Negative NEG^Negative    pH Urine 5.5 5.0 - 7.0 pH    Protein Albumin Urine Negative NEG^Negative mg/dL    Urobilinogen mg/dL Normal 0.0 - 2.0 mg/dL    Nitrite Urine Negative NEG^Negative    Leukocyte Esterase Urine Large (A) NEG^Negative    Source Clean catch urine     WBC Urine 86 (H) 0 - 5 /HPF    RBC Urine 3 (H) 0 - 2 /HPF    Bacteria Urine Few (A) NEG^Negative /HPF    Squamous Epithelial  "/HPF Urine 14 (H) 0 - 1 /HPF    Renal Tub Epi 3 (A) NEG^Negative /HPF    Mucous Urine Present (A) NEG^Negative /LPF    Hyaline Casts 10 (H) 0 - 2 /LPF   Hepatic panel    Collection Time: 03/24/21  5:25 AM   Result Value Ref Range    Bilirubin Direct 0.1 0.0 - 0.2 mg/dL    Bilirubin Total 0.3 0.2 - 1.3 mg/dL    Albumin 3.2 (L) 3.4 - 5.0 g/dL    Protein Total 6.7 (L) 6.8 - 8.8 g/dL    Alkaline Phosphatase 59 40 - 150 U/L    ALT 13 0 - 50 U/L    AST 12 0 - 45 U/L   Magnesium    Collection Time: 03/24/21  5:25 AM   Result Value Ref Range    Magnesium 2.2 1.6 - 2.3 mg/dL   Basic metabolic panel    Collection Time: 03/24/21  5:25 AM   Result Value Ref Range    Sodium 136 133 - 144 mmol/L    Potassium 3.6 3.4 - 5.3 mmol/L    Chloride 101 94 - 109 mmol/L    Carbon Dioxide 30 20 - 32 mmol/L    Anion Gap 5 3 - 14 mmol/L    Glucose 96 70 - 99 mg/dL    Urea Nitrogen 20 7 - 30 mg/dL    Creatinine 1.15 (H) 0.52 - 1.04 mg/dL    GFR Estimate 46 (L) >60 mL/min/[1.73_m2]    GFR Estimate If Black 53 (L) >60 mL/min/[1.73_m2]    Calcium 8.6 8.5 - 10.1 mg/dL   CBC with platelets    Collection Time: 03/24/21  5:25 AM   Result Value Ref Range    WBC 5.6 4.0 - 11.0 10e9/L    RBC Count 3.20 (L) 3.8 - 5.2 10e12/L    Hemoglobin 9.4 (L) 11.7 - 15.7 g/dL    Hematocrit 28.6 (L) 35.0 - 47.0 %    MCV 89 78 - 100 fl    MCH 29.4 26.5 - 33.0 pg    MCHC 32.9 31.5 - 36.5 g/dL    RDW 15.2 (H) 10.0 - 15.0 %    Platelet Count 271 150 - 450 10e9/L          Physical and Psychiatric Examination:     BP (!) 145/68   Pulse 82   Temp 99.4  F (37.4  C)   Resp 16   Ht 1.626 m (5' 4\")   Wt 77.2 kg (170 lb 3.2 oz)   SpO2 99%   BMI 29.21 kg/m    Weight is 170 lbs 3.2 oz  Body mass index is 29.21 kg/m .    Physical Exam:  I have reviewed the physical exam as documented by by the medical team and agree with findings and assessment and have no additional findings to add at this time.    Mental Status Exam:    Appearance: age-appearing, lying in hospital bed, " wearing gown, adequate hygiene, in no acute distress  Behavior: cooperative and calm  Orientation: alert and oriented to time, place and person  Movements: mild tremors in bilateral upper extremities; no tics or dystonia noted  Mood: depressed  Affect: mood-congruent, restricted in range, mildly dysphoric  Speech: Normal rate, rhythm, tone  Memory: recent memory appears mildly impaired; remote memory grossly intact based on recall of events  Fund of knowledge: average for development based on word choice and level of education  Concentration: attentive to interview  Thought process and content: linear and organized; no loosened associations; no delusions or paranoia endorsed or elicited on exam.   Insight: fair, able to identify pattern of problematic alcohol use  Judgement: fair, she is agreeable to CD treatment  Safety: denies suicidal or homicidal ideation, plan, or intent           DSM-5 Diagnosis:   #1 Alcohol use disorder, severe  #2 Alcohol withdrawal, uncomplicated  #3 Major Depressive Disorder, recurrent          Assessment:   Ms. Kavitha Headley is a 75 year old female who was brought to the emergency department by her daughter on 3/23/21 in the setting of alcohol abuse and unable to arouse patient. Patient drinks 8-10 drinks of demetrice per day, with increasing intake over the last week or more. PMH significant for alcohol use disorder, major depressive disorder, anxiety, alcoholic cirrhosis, CAD, CKD stage III, HTN, and GERD.    Ms. Headley is currently admitted to medicine, being treated for acute alcohol withdrawal symptoms. Use has progressively increased, this past week daughter increasingly worried about patient as she has been withdrawing socially. Daughter found patient passed out with pills around her, but reports this is due to patient dropping pills and being unable to pick them up due to her intoxicated state. Patient denies any intentional overdose. She certainly is at risk of taking the incorrect  medication or taking more medication than prescribed while intoxicated.     Pt reports symptoms of depression. She reports compliance with her medications, but this is not certain. She has been prescribed citalopram 20 mg daily, bupropion  mg daily, mirtazapine 15 mg at bedtime, trazodone 100 mg at bedtime, and gabapentin 300 mg TID. Pt tells me that she had not been taking the bupropion. I think it is reasonable to discontinue bupropion at this point. It lowers the seizure threshold and may not be the best medication to take if she is drinking. In review of the chart, it does not appear bupropion had been too helpful anyway. For now, would recommend she continue citalopram 20 mg daily, mirtazapine 15 mg at bedtime, and trazodone 100 mg at bedtime. Will also offer quetiapine 25 mg bid prn for sleep, anxiety, or hallucinations.     She certainly would be a candidate for residential CD treatment, to which she and daughter are in agreement. Daughter would like to be involved in the referral process to ensure patient is able to get to the facility and that it is covered by insurance. Prior to this, patient will need to work with physical therapy to improve mobility. She is quite weak right, but apparently was walking fine about a week ago.           Summary of Recommendations:   1) Recommend to discontinue 1:1 sitter and suicide precautions    2) Recommend to continue current psychotropic medication regimen    3) Recommend to offer quetiapine 25 mg PRN at bedtime for insomnia or hallucinations    3) Will discontinue bupropion. Continue citalopram 20 mg daily, mirtazapine 15 mg at bedtime, trazodone 100 mg at bedtime. These medications are not going to be effective while she is drinking heavily. Will not increase gabapentin secondary to her kidney disease.    4) Recommend residential CD treatment - CD consult has been ordered. Daughter tells me she would like to be involved with the referral process if  possible.    5) ECG reviewed - QTc 477, will want to keep an eye on QTc    6) Continue MSSA protocol at this time until scores improve    7) Page me with additional questions or concerns. Thank you for this consult.        ARISTEO Barnes, [facility]   Contact information available via [facility] Paging/Directory

## 2021-03-24 NOTE — H&P
"[facility]    History and Physical - Hospitalist Service       Date of Admission:  3/23/2021    Assessment & Plan   Kavitha Headley is a 75 year old female admitted on 3/23/2021 for evaluation of alcohol use and suicidal ideation. She has a PMHx significant for alcohol abuse, alcoholic cirrhosis of liver, depression, anxiety, CAD, CKD stage III, HTN, and GERD.     Patient was brought to the emergency department by the her daughter who is current concerned over the patient today.  Patient's been drinking excess amount of alcohol for the past several weeks.  She does have a history of alcohol abuse and last went through detox about 1 year prior.  Patient says that recently she is been drinking 8-10 drinks of demetrice every day.  Patient says that last night she ended up taking increased doses of baclofen and Benadryl he was trying to cut down at home which alcohol she is drinking.   At the ER CXR showed: \"Cardiomegaly and vascular prominence suggestive of fluid overload/early CHF\". BNP and troponin were negative. She received one dose of IV lasix. Tox screen was negative.       # Alcohol abuse   -MSSA protocol with valium as needed.   -CD consult.   -Continue on folic acid and multivitamin.   -Continue on thiamine.     # Suicide ideation   -Suicide precautions.  -Sitter at bedside.   -Psychiatry consult.     # Pulmonary edema ?   # Diastolic CHF ?   # Hx of aortic valve stenosis    # CAD   # HTN  -She had a TTE 3/16/21 that showed normal EF. Left ventricular diastolic function is indeterminate. Normal left ventricular wall motion. Moderate to severe valvular aortic stenosis. There is mild to moderate (1-2+) mitral regurgitation. There is mild mitral stenosis.    -She received one dose of IV lasix. Hold further diuretics for now, reassess volume status in the morning, strict I/O's and daily weight.   -Consider Cardiology consult in the morning due to hx of moderate aortic " "stenosis.   -Telemetry.   -Continue on PTA ASA.   -Continue on PTA Atenolol.   -Hold PTA Losartan for now, reassess in the morning and restart if renal function is stable.     # CKD   -Cr 1.2  -Hold lasix for now.  -Continue monitoring renal function and lytes.     # Anxiety   # Depression   -Continue on PTA antidepressants.     # Liver cirrhosis   -Continue monitoring LFT's        Diet: Advance Diet as Tolerated: Clear Liquid Diet  DVT Prophylaxis: Pneumatic Compression Devices  Montelongo Catheter: not present  Code Status: Full Code         Disposition Plan   Expected discharge: TBD   Entered: Per Su MD 03/23/2021, 7:53 PM     The patient's care was discussed with the Patient.    Per Su MD  [facility]  Contact information available via [facility] Paging/Directory      ______________________________________________________________________    Chief Complaint   Alcohol intoxication    History is obtained from the patient    History of Present Illness   Kavitha Headley is a 75 year old female admitted on 3/23/2021 for evaluation of alcohol use and suicidal ideation. She has a PMHx significant for alcohol abuse, alcoholic cirrhosis of liver, depression, anxiety, CAD, CKD stage III, HTN, and GERD.     Patient was brought to the emergency department by the her daughter who is current concerned over the patient today.  Patient's been drinking excess amount of alcohol for the past several weeks.  She does have a history of alcohol abuse and last went through detox about 1 year prior.  Patient says that recently she is been drinking 8-10 drinks of demetrice every day.  Patient says that last night she ended up taking increased doses of baclofen and Benadryl he was trying to cut down at home which alcohol she is drinking.   At the ER CXR showed: \"Cardiomegaly and vascular prominence suggestive of fluid overload/early CHF\". BNP and troponin were negative. She " received one dose of IV lasix. Tox screen was negative.   Patient endorses anxiety and depression. Upper extremity tremors. No chest pain, palpitations, cough, dizziness, nausea, vomit, fever or chills. Mild shortness of breath.     Review of Systems    The 10 point Review of Systems is negative other than noted in the HPI or here. Other ROS negative.     Past Medical History    I have reviewed this patient's medical history and updated it with pertinent information if needed.   Past Medical History:   Diagnosis Date     Alcohol abuse     Long term alcohol abuse. Abstinenet since October 2019.     Anxiety disorder      Ascending aorta dilatation (H)      Bariatric surgery status 1996?    gastric bypass, [employer] [facility] [employer] and     Benign hypertension      Chronic insomnia      Chronic pain syndrome     Chronic back and neck pain, chronic pain due to osteoarthritis multiple joints     Coronary artery disease involving native coronary artery of native heart without angina pectoris 10/16/2018    Minimal coronary artery disease on coronary angiogram in 2015.      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     Major depressive disorder, single episode, severe, without mention of psychotic behavior      Mixed hyperlipidemia      Moderate aortic stenosis 05/2014    moderate to severe aortic valve stenosis     Pelvic relaxation disorder     Surgical intervention for cystocele/rectocele 3,11/2012     Personal history of urinary calculi 6/2006    left ureteral stone,lithotripsy     Psoriasis      PVC (premature ventricular contraction)      Spinal stenosis      Stage III chronic kidney disease 2005     SVT (supraventricular tachycardia) (H)     likely atrial tachycardia       Past  Surgical History   I have reviewed this patient's surgical history and updated it with pertinent information if needed.  Past Surgical History:   Procedure Laterality Date     APPENDECTOMY  3/2004    incidental     ARTHRODESIS TOE(S) Right 1/31/2020    Procedure: RIGHT FIRST METATARSAL PHALANGEAL JOINT ARTHRODESIS;  Surgeon: Steven Reyes MD;  Location:  OR     C GASTRIC BYPASS,OBESE<100CM ARIANNA-EN-Y  1996     C MEDIASTINOSCOPY W OR WO BIOPSY  2/2008    Videomediastinoscopy and, for mediastinal adenopathy -reactive lymphoid hyperplasia     C REPAIR OF RECTOCELE  3/2012     C TOTAL KNEE ARTHROPLASTY  12/2005    left      CARPAL TUNNEL RELEASE RT/LT  10/2010    Carpometacarpal excisional arthroplasty with a fascial autograft and APL suspension sling (73066). 2. Left thumb metacarpophalangeal joint fusion with autologous bone graft (46134). 3. Left endoscopic carpal tunnel release      CHOLECYSTECTOMY, LAPOROSCOPIC  11/2010    Cholecystectomy, Laparoscopic     COLONOSCOPY N/A 9/8/2016    Procedure: COMBINED COLONOSCOPY, SINGLE OR MULTIPLE BIOPSY/POLYPECTOMY BY BIOPSY;  Surgeon: Moe Barlow MD;  Location:  GI     CYSTOCELE REPAIR  11/2012    davinc laparoscopic sacrocolpopexy, enterocele repair, lysis of adhesions, placement of retropubic mid urethral sling, cystoscopy     CYSTOSCOPY, LITHOTRIPSY, COMBINED  6/2006    Left extracorporeal shock wave lithotripsy, cystoscopy, left ureteral stent placement.     CYSTOSCOPY, REMOVE STENT(S), COMBINED  7/2006    Cystoscopy, removal of left ureteral stent, retrograde pyelography, flexible and rigid ureteroscopy and holmium laser lithotripsy, basket removal of stone fragments, ureteral stent placement.      ENDOSCOPIC ULTRASOUND UPPER GASTROINTESTINAL TRACT (GI) N/A 6/12/2017    Procedure: ENDOSCOPIC ULTRASOUND, ESOPHAGOSCOPY / UPPER GASTROINTESTINAL TRACT (GI);  ENDOSCOPIC ULTRASOUND, ESOPHAGOSCOPY / UPPER GASTROINTESTINAL TRACT (GI);  Surgeon: Parth Graham  "MD Melissa;  Location:  GI     HERNIA REPAIR  4/2012    bilateral augmentation mastopexy, ventral hernia repair, and medial thigh liposuction on 04/06/2012.      HYSTERECTOMY VAGINAL, BILATERAL SALPINGO-OOPHERECTOMY, COMBINED  1998    due to myoma and bleeding     JOINT REPLACEMENT, HIP RT/LT  4/2004    right total hip arthroplasty     LAPAROTOMY, LYSIS ADHESIONS, COMBINED  3/2004    lysis adhesions, ventral hernia repair, appendectomy incidentally     LYMPH NODE BIOPSY  4/2008    right axillary, reactive follicular and paracortical hyperplasia.     MAMMOPLASTY AUGMENTATION BILATERAL  4/2012     REPAIR HAMMER TOE Right 1/31/2020    Procedure: WITH SECOND AND THIRD CLAW TOE RECONSTRUCTION;  Surgeon: Steven Reyes MD;  Location:  OR     REVISE RECONSTRUCTED BREAST  6/7/2012    Left breast capsulotomy.        Social History   I have reviewed this patient's social history and updated it with pertinent information if needed.  Social History     Tobacco Use     Smoking status: Never Smoker     Smokeless tobacco: Never Used   Substance Use Topics     Alcohol use: Yes     Alcohol/week: 63.0 standard drinks     Types: 63 Standard drinks or equivalent per week     Comment: pt says she drinks \"6oz\" per day; daughter says pt is minimizing amount     Drug use: No       Family History   I have reviewed this patient's family history and updated it with pertinent information if needed.  Family History   Problem Relation Age of Onset     Substance Abuse Father      Cancer Father         throat and lung mets     Diabetes No family hx of      Coronary Artery Disease No family hx of      Cerebrovascular Disease No family hx of        Prior to Admission Medications   Prior to Admission Medications   Prescriptions Last Dose Informant Patient Reported? Taking?   Multiple Vitamins-Minerals (CENTRUM SILVER) per tablet  Self Yes No   Sig: Take 1 tablet by mouth daily   aspirin 81 MG EC tablet  Self Yes No   Sig: Take 81 mg by mouth " daily   atenolol (TENORMIN) 25 MG tablet   Yes No   Sig: Take 1 tablet (25 mg) by mouth daily   baclofen (LIORESAL) 10 MG tablet   No No   Sig: Take 1 tablet (10 mg) by mouth 3 times daily   buPROPion (WELLBUTRIN XL) 150 MG 24 hr tablet   No No   Sig: Take 1 tablet (150 mg) by mouth every morning   citalopram (CELEXA) 20 MG tablet   No No   Sig: Take 1 tablet (20 mg) by mouth daily   folic acid (FOLVITE) 1 MG tablet  Self Yes No   Sig: Take 1 mg by mouth daily   furosemide (LASIX) 20 MG tablet   Yes No   Sig: Take 1 tablet (20 mg) by mouth daily TAKE 1 TABLET DAILY   gabapentin (NEURONTIN) 300 MG capsule   No No   Sig: Take 1 capsule (300 mg) by mouth 3 times daily   hydrOXYzine (ATARAX) 25 MG tablet   No No   Sig: Take 1 tablet (25 mg) by mouth every 6 hours   losartan (COZAAR) 25 MG tablet   No No   Sig: Take 1 tablet (25 mg) by mouth every morning   mirtazapine (REMERON) 15 MG tablet   No No   Sig: Take 1 tablet (15 mg) by mouth At Bedtime   traZODone (DESYREL) 100 MG tablet   No No   Sig: Take 1 tablet (100 mg) by mouth At Bedtime   valACYclovir (VALTREX) 1000 mg tablet   No No   Sig: TAKE 2 TABS BY MOUTH EVERY 12 HOURS FOR 1 DAY ONLY FOR OUTBREAKS OF COLD SORES AS NEEDED      Facility-Administered Medications: None     Allergies   Allergies   Allergen Reactions     Bactrim [Sulfamethoxazole W/Trimethoprim] Hives     Codeine Itching     NAUSEA     Morphine Itching     NAUSEA       Physical Exam   Vital Signs:     BP: 113/78 Pulse: 69     SpO2: 90 % O2 Device: None (Room air)    Weight: 0 lbs 0 oz    General Appearance: Alert, room air, no acute distress.   Eyes: Pupils equal and reactive to light.   HEENT: Oral mucosa is moist.   Respiratory: Normal respiratory effort, clear lungs, no crackles or wheezing.   Cardiovascular: RRR, no murmur. No peripheral edema.   GI: Soft, + BS, no tenderness to palpation.   Lymph/Hematologic: No lymphadenopathy.   Genitourinary: Deferred.   Skin: No rash.   Musculoskeletal: No  swollen joints.    Neurologic: Alert, oriented x 3, no focal deficits. + upper extremity tremors.    Psychiatric: + anxious, endorses depression    Data   Data reviewed today: I reviewed all medications, new labs and imaging results over the last 24 hours. I personally reviewed the EKG tracing showing NSR, no acute ischemic changes .    Recent Labs   Lab 03/23/21  1506   WBC 6.9   HGB 10.1*   MCV 89         POTASSIUM 3.9   CHLORIDE 98   CO2 28   BUN 23   CR 1.22*   ANIONGAP 9   BIRGIT 8.8   *   ALBUMIN 3.6   PROTTOTAL 7.3   BILITOTAL 0.3   ALKPHOS 63   ALT 14   AST 11   TROPI <0.015

## 2021-03-24 NOTE — PHARMACY-ADMISSION MEDICATION HISTORY
Admission Medication History Completed by Pharmacy    See [facility] Admission Navigator for allergy information, preferred outpatient pharmacy and prior to admission medications.     Medication History Sources:     Prescription fill history (mail order [facility] and [facility] [place]) via Epic Surescripts data    Chart review - cardiology clinic visit on 3/16    Additional Information:    Admission medication history completed to best ability.  Writer unable to interview patient due to current mental health status. PTA medication list updated based on fill history only. Last doses and overall medication adherence unknown. Any over the counter products, vitamins or supplements may not be accurately reflected in the PTA medication list.      Per ED notes, when daughter found patient today, there were several pills laying on the ground.       All refills up to date, except could not verify if patient is currently prescribed aspirin 81mg PO Daily. Left medication on PTA med list.       Losartan 25mg PO daily -- new start 3/16/2021 by cardiologist     [place] :  Gabapentin 300mg capsules, qty #270 (90 days) last filled 3-    Prior to Admission medications    Medication Sig   Auth Provider   aspirin 81 MG EC tablet Take 81 mg by mouth daily       Unknown, Entered By History   atenolol (TENORMIN) 25 MG tablet Take 1 tablet (25 mg) by mouth daily   Herman Ugarte MD       baclofen (LIORESAL) 10 MG tablet Take 1 tablet (10 mg) by mouth 3 times daily   Nayeli Castorena PA-C       buPROPion (WELLBUTRIN XL) 150 MG 24 hr tablet     Take 1 tablet (150 mg) by mouth every morning   Nayeli Castorena PA-C   citalopram (CELEXA) 20 MG tablet Take 1 tablet (20 mg) by mouth daily       Nayeli Castorena PA-C   folic acid (FOLVITE) 1 MG tablet Take 1 mg by mouth daily       Unknown, Entered By History   furosemide (LASIX) 20 MG tablet Take 1 tablet (20 mg) by mouth daily        Herman Ugarte MD    gabapentin (NEURONTIN) 300 MG capsule     Take 1 capsule (300 mg) by mouth 3 times daily   Nayeli Castorena PA-C   hydrOXYzine (ATARAX) 25 MG tablet Take 1 tablet (25 mg) by mouth every 6 hours       Nayeli Castorena PA-C   losartan (COZAAR) 25 MG tablet Take 1 tablet (25 mg) by mouth every morning       Herman Ugarte MD   mirtazapine (REMERON) 15 MG tablet Take 1 tablet (15 mg) by mouth At Bedtime       Nayeli Castorena PA-C   Multiple Vitamins-Minerals (CENTRUM SILVER) per tablet     Take 1 tablet by mouth daily   Reported, Patient   traZODone (DESYREL) 100 MG tablet Take 1 tablet (100 mg) by mouth At Bedtime       Nayeli Castorena PA-C   valACYclovir (VALTREX) 1000 mg tablet TAKE 2 TABS BY MOUTH EVERY 12 HOURS FOR 1 DAY ONLY FOR OUTBREAKS OF COLD SORES AS NEEDED       Nayeli Castorena PA-C     Date completed: 03/23/21    Medication history completed by:   Maria Dolores Morgan, Pharm.D., [facility]  Behavioral Health Inpatient Pharmacist  [facility] ([facility]) Emergency Department  Phone: *[phone] ([facility]) or [phone]

## 2021-03-24 NOTE — PLAN OF CARE
Arrived to unit at 10pm from [facility] [place].Alert,well orientated.Notable tremors with activity.Requiring assist of 1 and walker.Up to commode atleast every 2-3 hrs.Note that pt was given IV lasix in ED prior to coming to unit.K level checked for 3.9.Mg level 1.5,replaced and will have level rechecked this morning.  Is passing gas,last BM 3/22/21.Denies any pain.Denies numbness/tingling sensation.  MSSA scoring 12 and 13,mostly for tremors.Was given valium x2.Recently,scored low at 4.On  telemonitor,NSR.  Desatts to 86-88% on room air when sleeping.LS clear.Placed on oxygen at 2lpm via NC overnight.  Bedside attendant for safety until Psyche says otherwise.

## 2021-03-24 NOTE — ED NOTES
[facility]   ED Nurse to Floor Handoff     Kavitha Headley is a 75 year old female who speaks English and lives with others,  in a home  They arrived in the ED by car from home    ED Chief Complaint: Alcohol Problem (daughter called 911 for unable to rouse pt this am; pt roused to gently shaking per EMS. Dtr says pt is taking too many of her rx meds-not taking as prescribed and drinking heavily for past week.) and Suicidal (pt says she is not sure if she is taking too many pills to kill herself or not; says she feels hopeless and has no will to live)    ED Dx;   Final diagnoses:   Alcohol withdrawal syndrome without complication (H)   Generalized muscle weakness         Needed?: No    Allergies:   Allergies   Allergen Reactions     Bactrim [Sulfamethoxazole W/Trimethoprim] Hives     Codeine Itching     NAUSEA     Morphine Itching     NAUSEA   .  Past Medical Hx:   Past Medical History:   Diagnosis Date     Alcohol abuse     Long term alcohol abuse. Abstinenet since October 2019.     Anxiety disorder      Ascending aorta dilatation (H)      Bariatric surgery status 1996?    gastric bypass, [facility] [employer] and     Benign hypertension      Chronic insomnia      Chronic pain syndrome     Chronic back and neck pain, chronic pain due to osteoarthritis multiple joints     Coronary artery disease involving native coronary artery of native heart without angina pectoris 10/16/2018    Minimal coronary artery disease on coronary angiogram in 2015.      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     Major depressive disorder, single episode, severe, without mention of psychotic behavior      Mixed hyperlipidemia      Moderate  aortic stenosis 05/2014    moderate to severe aortic valve stenosis     Pelvic relaxation disorder     Surgical intervention for cystocele/rectocele 3,11/2012     Personal history of urinary calculi 6/2006    left ureteral stone,lithotripsy     Psoriasis      PVC (premature ventricular contraction)      Spinal stenosis      Stage III chronic kidney disease 2005     SVT (supraventricular tachycardia) (H)     likely atrial tachycardia      Baseline Mental status: WDL  Current Mental Status changes: at basesline    Infection present or suspected this encounter: no  Sepsis suspected: No  Isolation type: No active isolations  Patient tested for COVID 19 prior to admission: YES     Activity level - Baseline/Home:  Stand with Assist  Activity Level - Current:   Stand with Assist    Bariatric equipment needed?: No    In the ED these meds were given:   Medications   0.9% sodium chloride BOLUS (0 mLs Intravenous Stopped 3/23/21 1918)     Followed by   sodium chloride 0.9% infusion ( Intravenous New Bag 3/23/21 1919)   LORazepam (ATIVAN) tablet 2 mg (2 mg Oral Given 3/23/21 1717)   furosemide (LASIX) injection 20 mg (20 mg Intravenous Given 3/23/21 1915)       Drips running?  No    Home pump  No    Current LDAs  Peripheral IV 03/23/21 Left Upper forearm (Active)   Site Assessment WDL 03/23/21 1500   Line Status Saline locked 03/23/21 1500   Number of days: 0       Incision/Surgical Site 04/06/12 Bilateral Breast (Active)   Number of days: 3273       Incision/Surgical Site 04/06/12 Abdomen (Active)   Number of days: 3273       Incision/Surgical Site Bilateral Other (Comment) (Active)   Number of days:        Incision/Surgical Site 06/07/12 Left Breast (Active)   Number of days: 3211       Incision/Surgical Site 01/31/20 Right Foot (Active)   Number of days: 417       Incision/Surgical Site 01/31/20 Medial;Right Foot (Active)   Number of days: 417       Labs results:   Labs Ordered and Resulted from Time of ED Arrival Up to the  Time of Departure from the ED   CBC WITH PLATELETS DIFFERENTIAL - Abnormal; Notable for the following components:       Result Value    RBC Count 3.46 (*)     Hemoglobin 10.1 (*)     Hematocrit 30.8 (*)     All other components within normal limits   COMPREHENSIVE METABOLIC PANEL - Abnormal; Notable for the following components:    Glucose 114 (*)     Creatinine 1.22 (*)     GFR Estimate 43 (*)     GFR Estimate If Black 49 (*)     All other components within normal limits   ALCOHOL BREATH TEST POCT - Normal   TROPONIN I   DRUG ABUSE SCREEN 6 CHEM DEP URINE ([facility])   NT PROBNP INPATIENT   ROUTINE UA WITH MICROSCOPIC   INFLUENZA A/B & SARS-COV2 PCR MULTIPLEX       Imaging Studies:   Recent Results (from the past 24 hour(s))   XR Chest 2 Views    Narrative    CHEST TWO VIEWS 3/23/2021 5:47 PM     HISTORY: SOB.    COMPARISON: 2/13/2020    FINDINGS: The heart is enlarged. There is pulmonary vascular  prominence but no interstitial edema or pleural effusion. No  pneumothorax or significant airspace consolidation.       Impression    IMPRESSION: Cardiomegaly and vascular prominence suggestive of fluid  overload/early CHF.     KELLEY MOORE MD       Recent vital signs:   /78   Pulse 69   SpO2 90%             Cardiac Rhythm: Normal Sinus  Pt needs tele? No  Skin/wound Issues: None    Code Status: Full Code    Pain control: good    Nausea control: good    Abnormal labs/tests/findings requiring intervention:     Family present during ED course? Yes   Family Comments/Social Situation comments: Daughter present    Tasks needing completion: None    Noé Joy, RN  [phone] [place] [facility]  [phone] [facility]

## 2021-03-25 LAB
ALBUMIN SERPL-MCNC: 3.3 G/DL (ref 3.4–5)
ALP SERPL-CCNC: 62 U/L (ref 40–150)
ALT SERPL W P-5'-P-CCNC: 16 U/L (ref 0–50)
ANION GAP SERPL CALCULATED.3IONS-SCNC: 4 MMOL/L (ref 3–14)
AST SERPL W P-5'-P-CCNC: 13 U/L (ref 0–45)
BILIRUB SERPL-MCNC: 0.2 MG/DL (ref 0.2–1.3)
BUN SERPL-MCNC: 21 MG/DL (ref 7–30)
CALCIUM SERPL-MCNC: 9 MG/DL (ref 8.5–10.1)
CHLORIDE SERPL-SCNC: 102 MMOL/L (ref 94–109)
CO2 SERPL-SCNC: 33 MMOL/L (ref 20–32)
CREAT SERPL-MCNC: 1.07 MG/DL (ref 0.52–1.04)
GFR SERPL CREATININE-BSD FRML MDRD: 50 ML/MIN/{1.73_M2}
GLUCOSE SERPL-MCNC: 94 MG/DL (ref 70–99)
POTASSIUM SERPL-SCNC: 3.8 MMOL/L (ref 3.4–5.3)
PROT SERPL-MCNC: 7.1 G/DL (ref 6.8–8.8)
SODIUM SERPL-SCNC: 139 MMOL/L (ref 133–144)

## 2021-03-25 PROCEDURE — 120N000002 HC R&B MED SURG/OB UMMC

## 2021-03-25 PROCEDURE — 250N000013 HC RX MED GY IP 250 OP 250 PS 637: Performed by: INTERNAL MEDICINE

## 2021-03-25 PROCEDURE — 250N000013 HC RX MED GY IP 250 OP 250 PS 637: Performed by: STUDENT IN AN ORGANIZED HEALTH CARE EDUCATION/TRAINING PROGRAM

## 2021-03-25 PROCEDURE — 99233 SBSQ HOSP IP/OBS HIGH 50: CPT | Performed by: INTERNAL MEDICINE

## 2021-03-25 PROCEDURE — 99207 PR CDG-MDM COMPONENT: MEETS HIGH - UP CODED: CPT | Performed by: INTERNAL MEDICINE

## 2021-03-25 PROCEDURE — 80053 COMPREHEN METABOLIC PANEL: CPT | Performed by: INTERNAL MEDICINE

## 2021-03-25 PROCEDURE — H0001 ALCOHOL AND/OR DRUG ASSESS: HCPCS

## 2021-03-25 PROCEDURE — 36415 COLL VENOUS BLD VENIPUNCTURE: CPT | Performed by: INTERNAL MEDICINE

## 2021-03-25 RX ADMIN — THIAMINE HCL TAB 100 MG 100 MG: 100 TAB at 08:24

## 2021-03-25 RX ADMIN — ATENOLOL 25 MG: 25 TABLET ORAL at 08:24

## 2021-03-25 RX ADMIN — MULTIPLE VITAMINS W/ MINERALS TAB 1 TABLET: TAB at 10:55

## 2021-03-25 RX ADMIN — ASPIRIN 81 MG: 81 TABLET, COATED ORAL at 08:25

## 2021-03-25 RX ADMIN — DIAZEPAM 10 MG: 5 TABLET ORAL at 00:03

## 2021-03-25 RX ADMIN — FUROSEMIDE 20 MG: 20 TABLET ORAL at 08:24

## 2021-03-25 RX ADMIN — DIAZEPAM 5 MG: 5 TABLET ORAL at 14:16

## 2021-03-25 RX ADMIN — DIAZEPAM 5 MG: 5 TABLET ORAL at 21:36

## 2021-03-25 RX ADMIN — DIAZEPAM 5 MG: 5 TABLET ORAL at 05:42

## 2021-03-25 RX ADMIN — CITALOPRAM HYDROBROMIDE 20 MG: 20 TABLET ORAL at 08:24

## 2021-03-25 RX ADMIN — DIAZEPAM 5 MG: 5 TABLET ORAL at 17:23

## 2021-03-25 RX ADMIN — TRAZODONE HYDROCHLORIDE 100 MG: 50 TABLET ORAL at 22:31

## 2021-03-25 RX ADMIN — GABAPENTIN 300 MG: 300 CAPSULE ORAL at 13:22

## 2021-03-25 RX ADMIN — DIAZEPAM 5 MG: 5 TABLET ORAL at 08:41

## 2021-03-25 RX ADMIN — GABAPENTIN 300 MG: 300 CAPSULE ORAL at 22:31

## 2021-03-25 RX ADMIN — MIRTAZAPINE 15 MG: 15 TABLET, FILM COATED ORAL at 22:31

## 2021-03-25 RX ADMIN — FOLIC ACID 1 MG: 1 TABLET ORAL at 08:24

## 2021-03-25 RX ADMIN — GABAPENTIN 300 MG: 300 CAPSULE ORAL at 08:25

## 2021-03-25 ASSESSMENT — ACTIVITIES OF DAILY LIVING (ADL)
DIFFICULTY_COMMUNICATING: NO
WHICH_OF_THE_ABOVE_FUNCTIONAL_RISKS_HAD_A_RECENT_ONSET_OR_CHANGE?: AMBULATION
TOILETING_ISSUES: NO
DIFFICULTY_EATING/SWALLOWING: NO
DRESSING/BATHING_DIFFICULTY: NO
WEAR_GLASSES_OR_BLIND: YES

## 2021-03-25 NOTE — PLAN OF CARE
"  VS: Blood pressure 120/50, pulse 67, temperature 98  F (36.7  C), temperature source Oral, resp. rate 18, height 1.626 m (5' 4\"), weight 77.2 kg (170 lb 3.2 oz), SpO2 94 %, not currently breastfeeding.  Patient os A&O to self, bed. Bed alarm on   O2: On 2 L per N/C  SATs 94%   Output: Incontinent x 1, linen changed, assisted to BSC was fior to void 200 ml of clear urine this shift   Last BM: LBM 3/22   Activity: Up with assist one to two using a walker and GB to BSC   Skin: intact   Pain: Denies pain    CMS: Intact   Dressing: None   Diet: Regular diet with fair appetite, may need assist with feeding d/t tremors   LDA: PIV in left arm patent SL   Equipment: Walker   Plan: Continue with POC   Additional Info: Had a dose of IV lasix this am, on I&O's.  Last  MSSA score was 11 , Valium 10 mg given at 1714. Tremors are much better compared to am shift.  On Telemetry, in NSR.       "

## 2021-03-25 NOTE — PROGRESS NOTES
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 her drinking as problematic to self, not having sober coping skills to stay sober on her own. She displays a lack of consistency of behaviors and appears willing to explore treatment to change. She appears to be in the contemplation stages of change.   Dimension V: Relapse/Continued Use/Continued problem potential      Previous ratin Current ratin   Comments:    Patient reports prior 6 treatment episodes and longest period of sobriety was over 10 years.  Patient is a very high risk for return to use outside of a structured, residential treatment environment.  She lacks adequate insight into the disease of addiction and the lengths she must be willing to go to obtain sobriety.  Pt needs to develop relapse prevention skills in order to achieve log term sobriety.      Dimension VI: Recovery environment    Previous rating:    3  Current rating:    3   Comments:    Patient lives alone, is in a long term relationship, and has three children who are supportive. 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 She lacks sober support, is not engaged in any structured activities and denies any legal issue.      Summary of Assessment Update and Recommendations:   What was the outcome of last referral?  Patient had failed to follow through with referral provided.      Reason for changes in the Risk Description since last assessment? Patient had failed to follow through with referral provided and continued to drink to help her with sleep.      Recommendation and rationale for current request and significant issues that need to be addressed:     1)  Complete a residential based or similar treatment program, such as [facility], [facility].   2)  Abstain from all mood-altering chemicals unless prescribed by a licensed provider.   3)  Attend, at minimum, 2 weekly support group meetings, such as 12 step based (AA/NA), SMART Recovery, Health Realizations, and/or [facility] meetings.     4)  Actively work with a female mentor/sponsor on a weekly basis.

## 2021-03-25 NOTE — PLAN OF CARE
"      VS:   BP (!) 152/70 (BP Location: Right arm)   Pulse 63   Temp 98  F (36.7  C) (Oral)   Resp 18   Ht 1.626 m (5' 4\")   Wt 77.2 kg (170 lb 3.2 oz)   SpO2 97%   BMI 29.21 kg/m    RA   Output:   Patient is up to bathroom SBA.  LBM 3/22 did stated she felt constipated I paged Dr. Olivo about stool softeners did not hear back.    Activity:   Up SBA, ambulated in room to bathroom and in flores during shift.    Skin: WDL   Pain:   Patient denied pain during shift.   Neuro/CMS:   A&Ox4, no N/T, strengths intact, has tremors in all 4 extremities.   Did report some mild hallucinations in edges of vision.    Dressing(s):   NA   Diet:   Regular diet tolerating well, does eat slow due to tremors.    LDA:   PIV L AC SL.   Equipment:   Walker, gait belt, IV pole, chair alarm.   Plan:   Continue to monitor, continue plan of care, continue MSSA, transfer to .   Additional Info:   stated she felt constipated I paged Dr. olivo about stool softeners did not hear back.  MSSA 8, 6, 9   5 mg given at 0830, and 5 mg given at 1430.      "

## 2021-03-25 NOTE — CONSULTS
3/25/2021    CD consult completed.  Pt reports she has been trying to get into [facility], but needed a CD update. Pt reports she was trying to do that last week but had some complications figuring out where the appt was.   Update/eval completed, I will send referral to [facility]. I will email SREEKANTH to .      Recommendations:   1. Abstain from all non-prescribed mood-altering substances  2. Take all medications as prescribed  3. Enter and complete a residential or inpatient treatment program such as [facility]   4. Follow all recommendations upon discharge from treatment. Recommendations may include, but are not limited to: extended treatment, outpatient treatment and/or sober housing.  5. Follow all recommendations of your medical providers     Referral:  [facility]   Phone: [phone]  Fax: [phone]  Per [facility] tracker:  Phone: [phone]  Fax: [phone]     BUBBA consult completed by: DAVON Zafar.  Phone Number: [phone]             E-mail Address: [email or web address]  [address] [place] [address]

## 2021-03-25 NOTE — PROGRESS NOTES
[facility]    Medicine Progress Note - Hospitalist Service       Date of Admission:  3/23/2021  Assessment & Plan        75 year old female with a past medical history significant for alcohol abuse, alcoholic cirrhosis of liver, depression, anxiety, CAD, CKD stage III, HTN, severe aortic stenosis, diastolic heart failure  and GERD who is admitted for increasing  of alcohol use ( treatment of withdrawal)  and suicidal ideation.    # Acute Alcohol withdrawal  #Alcohol dependency    -MSSA protocol with valium as needed. Has needed > 50 mg of Valium in the last 24 hrs and still appears very tremulous  - Patient mildly unsteady as a result of withdrawal and will continue to need supervised care   -CD consult ordered and recommendations noted   -Continue on folic acid and multivitamin.   -Continue on thiamine.      # Suicide ideation, resolved   -Psychiatry consult noted     Recommendations:  1) Recommend to discontinue 1:1 sitter and suicide precautions     2) Recommend to continue current psychotropic medication regimen     3) Recommend to offer quetiapine 25 mg PRN at bedtime for insomnia or hallucinations     3) Will discontinue bupropion. Continue citalopram 20 mg daily, mirtazapine 15 mg at bedtime, trazodone 100 mg at bedtime. These medications are not going to be effective while she is drinking heavily. Will not increase gabapentin secondary to her kidney disease.     4) Recommend residential CD treatment - CD consult has been ordered. Daughter tells me she would like to be involved with the referral process if possible.     # Pulmonary edema   # Diastolic CHF   # Hx of aortic valve stenosis    # CAD   # HTN  -She had a TTE 3/16/21 that showed normal EF. Left ventricular diastolic function is indeterminate. Normal left ventricular wall motion. Moderate to severe valvular aortic stenosis. There is mild to moderate (1-2+) mitral regurgitation. There is mild mitral  stenosis.    - 2 doses of IV lasix with improvement in breathing  Resume home dose of lasix   -Telemetry.   -Continue on PTA ASA.   -Continue on PTA Atenolo     # CKD   -Cr 1.2, improved to 1.15 to 1.07  -Continue monitoring renal function and lytes.      # Anxiety   # Depression   -Continue on PTA antidepressants.      # Liver cirrhosis   -Continue monitoring LFT's . Appear within normal limits           Diet: Regular Diet Adult    DVT Prophylaxis: Pneumatic Compression Devices  Montelongo Catheter: not present  Code Status: Full Code           Disposition Plan   Expected discharge: 2 - 3 days, recommended to inpatinet alcohol treatment Vs detox unit Vs home  once patinet is completed with alcohol withdrawal .  Entered: Pallavi Crowell MD 03/25/2021, 11:50 AM       The patient's care was discussed with the Bedside Nurse, Care Coordinator/ and Patient.    Pallavi Crowell MD  Hospitalist Service  [facility]  Contact information available via [facility] Paging/Directory    ______________________________________________________________________    Interval History   Patient feels slightly better. In that , her breathing has improved  No chest pain or SOB  Alert and orientated  Eating and drinking well         Data reviewed today: I reviewed all medications, new labs and imaging results over the last 24 hours. I personally reviewed no images or EKG's today.    Physical Exam   Vital Signs: Temp: 97.3  F (36.3  C) Temp src: Oral BP: (!) 173/73(RN notified) Pulse: 66   Resp: 18 SpO2: 97 % O2 Device: None (Room air) Oxygen Delivery: 1 LPM  Weight: 170 lbs 3.2 oz  General Appearance: Awake, alert and in mild distress  Respiratory: Bilateral crackles heard   Cardiovascular: Regular heart rate. Ejection systolic murmur heard   GI: Soft, non tender. Normal bowel sounds   Skin: No bruising or bleeding   Other: Awake, alert and orientated X 3. Coarse  tremors    Data   Recent Labs   Lab 03/25/21  0514 03/24/21  0525 03/23/21  2213 03/23/21  1506   WBC  --  5.6  --  6.9   HGB  --  9.4*  --  10.1*   MCV  --  89  --  89   PLT  --  271  --  288    136  --  135   POTASSIUM 3.8 3.6 3.9 3.9   CHLORIDE 102 101  --  98   CO2 33* 30  --  28   BUN 21 20  --  23   CR 1.07* 1.15*  --  1.22*   ANIONGAP 4 5  --  9   BIRGIT 9.0 8.6  --  8.8   GLC 94 96  --  114*   ALBUMIN 3.3* 3.2*  --  3.6   PROTTOTAL 7.1 6.7*  --  7.3   BILITOTAL 0.2 0.3  --  0.3   ALKPHOS 62 59  --  63   ALT 16 13  --  14   AST 13 12  --  11   TROPI  --   --   --  <0.015

## 2021-03-25 NOTE — PLAN OF CARE
"    VS: Blood pressure 152/70 pulse 63, temperature 98  F (36.7  C), temperature source Oral, resp. rate 18, height 1.626 m (5' 4\"), weight 77.2 kg (170 lb 3.2 oz), SpO2 97 %  Patient is A&Ox4 Bed alarm on   O2: Room air 97%   Output: Incontinent x1, assist to BSC   Last BM: LBM 3/22   Activity: Up with assist one to two using a walker and gait belt to BSC   Skin: intact   Pain: Denies pain    CMS: Intact   Dressing: None   Diet: Regular diet with fair appetite, may need assist with feeding d/t tremors   LDA: PIV in left arm patent SL   Equipment: Walker   Plan: Continue with POC   Additional Info: Last  MSSA score 8, 6, 9  On Telemetry, in NSR.       "

## 2021-03-25 NOTE — PLAN OF CARE
"      VS:   /68 (BP Location: Right arm)   Pulse 62   Temp 98.3  F (36.8  C)   Resp 18   Ht 1.626 m (5' 4\")   Wt 77.2 kg (170 lb 3.2 oz)   SpO2 94%   BMI 29.21 kg/m    VSS. RA   Output:   Voiding without difficulty.    Activity:   SBA walker and gait belt. Bed alarm on for safety.    Skin: Intact.   Pain:   Back pain managed with ambulating and repositioning.    Neuro/CMS:   A/Ox4. CMS intact.   Dressing(s):   None.   Diet:   Regular, poor intake.     LDA:   PIV SL   Equipment:   Call light.   Plan:   Continue to monitor and follow plan of care. Pt able to make needs known and call light in reach   Additional Info:   BLACK 8, 12.       "

## 2021-03-25 NOTE — PLAN OF CARE
"/63 (BP Location: Right arm)   Pulse 75   Temp 97.8  F (36.6  C) (Oral)   Resp 16   Ht 1.626 m (5' 4\")   Wt 77.2 kg (170 lb 3.2 oz)   SpO2 91%   BMI 29.21 kg/m     Alert,using her call light appropriately.  MSSA scored 11,and 8.Scoring mainly for tremors which is still notable with activity but reports tremors are better than yesterday.  PIV line L AC,saline locked.  Desating to lowest at 86% on room air when sleeping.Kept at 1lpm via NC.  Slept from midnight to 5:3O am when lab came to draw blood.  6AM:Weaned off oxygen this morning,tolerating well.      "

## 2021-03-26 PROCEDURE — 120N000002 HC R&B MED SURG/OB UMMC

## 2021-03-26 PROCEDURE — 250N000013 HC RX MED GY IP 250 OP 250 PS 637: Performed by: INTERNAL MEDICINE

## 2021-03-26 PROCEDURE — 250N000013 HC RX MED GY IP 250 OP 250 PS 637: Performed by: STUDENT IN AN ORGANIZED HEALTH CARE EDUCATION/TRAINING PROGRAM

## 2021-03-26 PROCEDURE — 99233 SBSQ HOSP IP/OBS HIGH 50: CPT | Performed by: INTERNAL MEDICINE

## 2021-03-26 PROCEDURE — 99207 PR CDG-CUT & PASTE-POTENTIAL IMPACT ON LEVEL: CPT | Performed by: INTERNAL MEDICINE

## 2021-03-26 RX ORDER — POLYETHYLENE GLYCOL 3350 17 G/17G
17 POWDER, FOR SOLUTION ORAL DAILY
Status: DISCONTINUED | OUTPATIENT
Start: 2021-03-27 | End: 2021-03-27

## 2021-03-26 RX ORDER — AMOXICILLIN 250 MG
1 CAPSULE ORAL 2 TIMES DAILY
Status: DISCONTINUED | OUTPATIENT
Start: 2021-03-26 | End: 2021-03-29 | Stop reason: HOSPADM

## 2021-03-26 RX ORDER — BISACODYL 10 MG
10 SUPPOSITORY, RECTAL RECTAL DAILY PRN
Status: DISCONTINUED | OUTPATIENT
Start: 2021-03-26 | End: 2021-03-29 | Stop reason: HOSPADM

## 2021-03-26 RX ADMIN — MULTIPLE VITAMINS W/ MINERALS TAB 1 TABLET: TAB at 08:42

## 2021-03-26 RX ADMIN — GABAPENTIN 300 MG: 300 CAPSULE ORAL at 13:31

## 2021-03-26 RX ADMIN — DIAZEPAM 5 MG: 5 TABLET ORAL at 00:58

## 2021-03-26 RX ADMIN — GABAPENTIN 300 MG: 300 CAPSULE ORAL at 19:39

## 2021-03-26 RX ADMIN — FOLIC ACID 1 MG: 1 TABLET ORAL at 08:43

## 2021-03-26 RX ADMIN — CITALOPRAM HYDROBROMIDE 20 MG: 20 TABLET ORAL at 08:43

## 2021-03-26 RX ADMIN — FUROSEMIDE 20 MG: 20 TABLET ORAL at 08:43

## 2021-03-26 RX ADMIN — DIAZEPAM 5 MG: 5 TABLET ORAL at 12:32

## 2021-03-26 RX ADMIN — ATENOLOL 25 MG: 25 TABLET ORAL at 08:42

## 2021-03-26 RX ADMIN — MIRTAZAPINE 15 MG: 15 TABLET, FILM COATED ORAL at 22:17

## 2021-03-26 RX ADMIN — DIAZEPAM 5 MG: 5 TABLET ORAL at 17:07

## 2021-03-26 RX ADMIN — DIAZEPAM 5 MG: 5 TABLET ORAL at 23:46

## 2021-03-26 RX ADMIN — THIAMINE HCL TAB 100 MG 100 MG: 100 TAB at 08:43

## 2021-03-26 RX ADMIN — DIAZEPAM 5 MG: 5 TABLET ORAL at 06:15

## 2021-03-26 RX ADMIN — TRAZODONE HYDROCHLORIDE 100 MG: 50 TABLET ORAL at 22:17

## 2021-03-26 RX ADMIN — GABAPENTIN 300 MG: 300 CAPSULE ORAL at 08:42

## 2021-03-26 RX ADMIN — DOCUSATE SODIUM 50 MG AND SENNOSIDES 8.6 MG 1 TABLET: 8.6; 5 TABLET, FILM COATED ORAL at 19:39

## 2021-03-26 RX ADMIN — ASPIRIN 81 MG: 81 TABLET, COATED ORAL at 08:42

## 2021-03-26 NOTE — PLAN OF CARE
"    VS:    /75 (BP Location: Right arm)   Pulse 64   Temp 96.6  F (35.9  C)   Resp 16  Ht 1.626 m (5' 4\")   Wt 77.2 kg (170 lb 3.2 oz)   SpO2 97%   BMI 29.21 kg/m    VSS. RA   Output:    Voiding without difficulty to bathroom. Constipated -LBM 3/22. Talked with provider about adding bowel meds. Please give these as soon as they are available    Activity:    SBA walker and gait belt. Bed alarm on for safety.    Skin: Intact.   Pain:    Back pain managed with ambulating and repositioning.    Neuro/CMS:    A/Ox4. Stated visual hallucinations 1x this morning   Dressing(s):    None.   Diet:    Regular, fair appetite.     LDA:    PIV SL   Equipment:    Call light.   Plan:    Continue to monitor and follow plan of care. Pt able to make needs known and call light in reach   Additional Info:    MSSA scores of 5, 9, and 6. Valium given 5 mg x1 at 1230.  Pt would like a shower after daughter leaves today.       "

## 2021-03-26 NOTE — PLAN OF CARE
A&O x4. Pt continues to have withdrawal related visual hallucinations of things floating in the air. VSS but BP continues to be somewhat elevated in the 140s. Pt denies pain, nausea, SOB or numbness/tingling in extremities. Pt rates anxiety a 4/10 and depression 8/10 this shift. Pt denies SI or SIB. Pt given Valium per parameters/MSSA overnight (see MAR.) Pt states she has not had a bowel movement in a number of days. Pt states this is not unusual for her to go some time without BM but would like to try some additional bowel medications in the morning. Pt slept majority of shift. Call light within reach and pt able to make needs known.

## 2021-03-26 NOTE — PROGRESS NOTES
[facility]    Medicine Progress Note - Hospitalist Service       Date of Admission:  3/23/2021  Assessment & Plan        75 year old female with a past medical history significant for alcohol abuse, alcoholic cirrhosis of liver, depression, anxiety, CAD, CKD stage III, HTN, severe aortic stenosis, diastolic heart failure  and GERD who is admitted for increasing  of alcohol use ( treatment of withdrawal)  and suicidal ideation.    # Acute Alcohol withdrawal  #Alcohol dependency    -[facility] protocol with valium as needed. Has needed 30mg of Valium in the last 24 hrs and still going through detox with visual hallucinations   - Patient mildly unsteady as a result of withdrawal and will continue to need supervised care   -CD consult ordered and recommendations noted   -Continue on folic acid and multivitamin.   -Continue on thiamine.      # Suicide ideation, resolved   -Psychiatry consult noted     Recommendations:  1) Recommend to discontinue 1:1 sitter and suicide precautions  2) Recommend to continue current psychotropic medication regimen  3) Recommend to offer quetiapine 25 mg PRN at bedtime for insomnia or hallucinations  4) Will discontinue bupropion. Continue citalopram 20 mg daily, mirtazapine 15 mg at bedtime, trazodone 100 mg at bedtime. These medications are not going to be effective while she is drinking heavily. Will not increase gabapentin secondary to her kidney disease.  5) Recommend residential CD treatment - CD consult has been ordered. Daughter tells me she would like to be involved with the referral process if possible.    Patient  has a referral to [facility].         # Pulmonary edema   # Diastolic CHF   # Hx of aortic valve stenosis    # CAD   # HTN  -She had a TTE 3/16/21 that showed normal EF. Left ventricular diastolic function is indeterminate. Normal left ventricular wall motion. Moderate to severe valvular aortic stenosis. There is mild to  moderate (1-2+) mitral regurgitation. There is mild mitral stenosis.    - 2 doses of IV lasix with improvement in breathing  Resume home dose of lasix   -Continue on PTA ASA.   -Continue on PTA Atenolol     # CKD   -Cr 1.2, improved to 1.15 to 1.07  -Continue monitoring renal function and lytes.      # Anxiety   # Depression   -Continue on PTA antidepressants.      # Liver cirrhosis   -Continue monitoring LFT's . Appear within normal limits           Diet: Regular Diet Adult    DVT Prophylaxis: Pneumatic Compression Devices  Montelongo Catheter: not present  Code Status: Full Code           Disposition Plan   Expected discharge: 2 - 3 days, recommended to inpatinet alcohol treatment Vs detox unit Vs home  once patinet is completed with alcohol withdrawal .  Entered: Pallavi Crowell MD 03/26/2021, 11:52 AM       The patient's care was discussed with the Bedside Nurse, Care Coordinator/ and Patient.    Pallavi Crowell MD  Hospitalist Service  [facility]  Contact information available via [facility] Paging/Directory    ______________________________________________________________________    Interval History   Patient has been having visual hallucinations. Tells me that her withdrawal symptoms have not been this bad in the past  Says she is too weak and even unable to press the buttons on the lap top due to her tremors  She denies chest pain or SOB  She has been eating and drinking well       Data reviewed today: I reviewed all medications, new labs and imaging results over the last 24 hours. I personally reviewed no images or EKG's today.    Physical Exam   Vital Signs: Temp: 96.7  F (35.9  C) Temp src: Axillary BP: 134/75 Pulse: 68   Resp: 16 SpO2: 98 % O2 Device: None (Room air)    Weight: 170 lbs 3.2 oz  General Appearance: Awake, alert and in mild distress  Respiratory: Bilateral crackles heard ( L >R)  Cardiovascular: Regular heart rate.  Ejection systolic murmur heard   GI: Soft, non tender. Normal bowel sounds   Skin: No bruising or bleeding   Other: Awake, alert and orientated X 3. Coarse tremors    Data   Recent Labs   Lab 03/25/21  0514 03/24/21  0525 03/23/21  2213 03/23/21  1506   WBC  --  5.6  --  6.9   HGB  --  9.4*  --  10.1*   MCV  --  89  --  89   PLT  --  271  --  288    136  --  135   POTASSIUM 3.8 3.6 3.9 3.9   CHLORIDE 102 101  --  98   CO2 33* 30  --  28   BUN 21 20  --  23   CR 1.07* 1.15*  --  1.22*   ANIONGAP 4 5  --  9   BIRGIT 9.0 8.6  --  8.8   GLC 94 96  --  114*   ALBUMIN 3.3* 3.2*  --  3.6   PROTTOTAL 7.1 6.7*  --  7.3   BILITOTAL 0.2 0.3  --  0.3   ALKPHOS 62 59  --  63   ALT 16 13  --  14   AST 13 12  --  11   TROPI  --   --   --  <0.015

## 2021-03-27 ENCOUNTER — APPOINTMENT (OUTPATIENT)
Dept: OCCUPATIONAL THERAPY | Facility: CLINIC | Age: 76
DRG: 897 | End: 2021-03-27
Attending: INTERNAL MEDICINE
Payer: COMMERCIAL

## 2021-03-27 LAB
ALBUMIN SERPL-MCNC: 3.1 G/DL (ref 3.4–5)
ALP SERPL-CCNC: 54 U/L (ref 40–150)
ALT SERPL W P-5'-P-CCNC: 12 U/L (ref 0–50)
ANION GAP SERPL CALCULATED.3IONS-SCNC: 7 MMOL/L (ref 3–14)
AST SERPL W P-5'-P-CCNC: 12 U/L (ref 0–45)
BILIRUB SERPL-MCNC: 0.3 MG/DL (ref 0.2–1.3)
BUN SERPL-MCNC: 21 MG/DL (ref 7–30)
CALCIUM SERPL-MCNC: 9.1 MG/DL (ref 8.5–10.1)
CHLORIDE SERPL-SCNC: 106 MMOL/L (ref 94–109)
CO2 SERPL-SCNC: 27 MMOL/L (ref 20–32)
CREAT SERPL-MCNC: 0.94 MG/DL (ref 0.52–1.04)
ERYTHROCYTE [DISTWIDTH] IN BLOOD BY AUTOMATED COUNT: 15.7 % (ref 10–15)
GFR SERPL CREATININE-BSD FRML MDRD: 59 ML/MIN/{1.73_M2}
GLUCOSE SERPL-MCNC: 96 MG/DL (ref 70–99)
HCT VFR BLD AUTO: 33.4 % (ref 35–47)
HGB BLD-MCNC: 10.7 G/DL (ref 11.7–15.7)
MCH RBC QN AUTO: 29.4 PG (ref 26.5–33)
MCHC RBC AUTO-ENTMCNC: 32 G/DL (ref 31.5–36.5)
MCV RBC AUTO: 92 FL (ref 78–100)
PLATELET # BLD AUTO: 289 10E9/L (ref 150–450)
POTASSIUM SERPL-SCNC: 3.8 MMOL/L (ref 3.4–5.3)
PROT SERPL-MCNC: 6.9 G/DL (ref 6.8–8.8)
RBC # BLD AUTO: 3.64 10E12/L (ref 3.8–5.2)
SODIUM SERPL-SCNC: 140 MMOL/L (ref 133–144)
WBC # BLD AUTO: 5.6 10E9/L (ref 4–11)

## 2021-03-27 PROCEDURE — 250N000013 HC RX MED GY IP 250 OP 250 PS 637: Performed by: STUDENT IN AN ORGANIZED HEALTH CARE EDUCATION/TRAINING PROGRAM

## 2021-03-27 PROCEDURE — 99232 SBSQ HOSP IP/OBS MODERATE 35: CPT | Performed by: INTERNAL MEDICINE

## 2021-03-27 PROCEDURE — 80053 COMPREHEN METABOLIC PANEL: CPT | Performed by: INTERNAL MEDICINE

## 2021-03-27 PROCEDURE — 97165 OT EVAL LOW COMPLEX 30 MIN: CPT | Mod: GO | Performed by: OCCUPATIONAL THERAPIST

## 2021-03-27 PROCEDURE — 250N000013 HC RX MED GY IP 250 OP 250 PS 637: Performed by: INTERNAL MEDICINE

## 2021-03-27 PROCEDURE — 120N000002 HC R&B MED SURG/OB UMMC

## 2021-03-27 PROCEDURE — 97535 SELF CARE MNGMENT TRAINING: CPT | Mod: GO | Performed by: OCCUPATIONAL THERAPIST

## 2021-03-27 PROCEDURE — 97110 THERAPEUTIC EXERCISES: CPT | Mod: GO | Performed by: OCCUPATIONAL THERAPIST

## 2021-03-27 PROCEDURE — 85027 COMPLETE CBC AUTOMATED: CPT | Performed by: INTERNAL MEDICINE

## 2021-03-27 PROCEDURE — 36416 COLLJ CAPILLARY BLOOD SPEC: CPT | Performed by: INTERNAL MEDICINE

## 2021-03-27 RX ORDER — HYDROXYZINE HYDROCHLORIDE 25 MG/1
25 TABLET, FILM COATED ORAL 3 TIMES DAILY PRN
Status: DISCONTINUED | OUTPATIENT
Start: 2021-03-27 | End: 2021-03-29 | Stop reason: HOSPADM

## 2021-03-27 RX ORDER — ATENOLOL 25 MG/1
25 TABLET ORAL 2 TIMES DAILY
Status: DISCONTINUED | OUTPATIENT
Start: 2021-03-27 | End: 2021-03-29 | Stop reason: HOSPADM

## 2021-03-27 RX ORDER — POLYETHYLENE GLYCOL 3350 17 G/17G
17 POWDER, FOR SOLUTION ORAL EVERY EVENING
Status: DISCONTINUED | OUTPATIENT
Start: 2021-03-27 | End: 2021-03-29 | Stop reason: HOSPADM

## 2021-03-27 RX ADMIN — HYDROXYZINE HYDROCHLORIDE 25 MG: 25 TABLET, FILM COATED ORAL at 10:10

## 2021-03-27 RX ADMIN — DIAZEPAM 5 MG: 5 TABLET ORAL at 18:17

## 2021-03-27 RX ADMIN — DOCUSATE SODIUM 50 MG AND SENNOSIDES 8.6 MG 1 TABLET: 8.6; 5 TABLET, FILM COATED ORAL at 20:09

## 2021-03-27 RX ADMIN — THIAMINE HCL TAB 100 MG 100 MG: 100 TAB at 07:50

## 2021-03-27 RX ADMIN — ASPIRIN 81 MG: 81 TABLET, COATED ORAL at 07:50

## 2021-03-27 RX ADMIN — TRAZODONE HYDROCHLORIDE 100 MG: 50 TABLET ORAL at 22:15

## 2021-03-27 RX ADMIN — BISACODYL 10 MG: 10 SUPPOSITORY RECTAL at 11:45

## 2021-03-27 RX ADMIN — GABAPENTIN 300 MG: 300 CAPSULE ORAL at 07:49

## 2021-03-27 RX ADMIN — MIRTAZAPINE 15 MG: 15 TABLET, FILM COATED ORAL at 22:15

## 2021-03-27 RX ADMIN — FUROSEMIDE 20 MG: 20 TABLET ORAL at 07:51

## 2021-03-27 RX ADMIN — GABAPENTIN 300 MG: 300 CAPSULE ORAL at 13:07

## 2021-03-27 RX ADMIN — CITALOPRAM HYDROBROMIDE 20 MG: 20 TABLET ORAL at 07:51

## 2021-03-27 RX ADMIN — SODIUM PHOSPHATE, DIBASIC AND SODIUM PHOSPHATE, MONOBASIC 1 ENEMA: 7; 19 ENEMA RECTAL at 15:47

## 2021-03-27 RX ADMIN — MULTIPLE VITAMINS W/ MINERALS TAB 1 TABLET: TAB at 07:50

## 2021-03-27 RX ADMIN — DOCUSATE SODIUM 50 MG AND SENNOSIDES 8.6 MG 1 TABLET: 8.6; 5 TABLET, FILM COATED ORAL at 07:50

## 2021-03-27 RX ADMIN — ATENOLOL 25 MG: 25 TABLET ORAL at 07:50

## 2021-03-27 RX ADMIN — GABAPENTIN 300 MG: 300 CAPSULE ORAL at 20:10

## 2021-03-27 RX ADMIN — ATENOLOL 25 MG: 25 TABLET ORAL at 20:10

## 2021-03-27 RX ADMIN — POLYETHYLENE GLYCOL 3350 17 G: 17 POWDER, FOR SOLUTION ORAL at 20:09

## 2021-03-27 RX ADMIN — FOLIC ACID 1 MG: 1 TABLET ORAL at 07:50

## 2021-03-27 RX ADMIN — POLYETHYLENE GLYCOL 3350 17 G: 17 POWDER, FOR SOLUTION ORAL at 07:51

## 2021-03-27 NOTE — PLAN OF CARE
"      VS:   BP (!) 152/70 (BP Location: Left arm)   Pulse 70   Temp 98.7  F (37.1  C) (Oral)   Resp 16   Ht 1.626 m (5' 4\")   Wt 77.2 kg (170 lb 3.2 oz)   SpO2 99%   BMI 29.21 kg/m    RA   Output:   Patient up to bathroom voids without difficulty.  LBM 3/22. Moonlighter paged and patient started on bowel meds for constipation.    Activity:   Assist of 1, walked in flores and ambulated to bathroom.    Skin: WDL   Pain:   Patient denied pain during the shift.   Neuro/CMS:   A&Ox4, strengths intact, no N/T reported.    Dressing(s):   NA   Diet:   Regular diet, tolerating well, good appetite at dinner.   LDA:   PIV L AC, blood at insertion site but flushed well.   Equipment:   Walker, gait belt, call light, personal belongings, IV pole.    Plan:   Continue to monitor, continue plan of care, working with  to look for treatment.   Additional Info:   patient taken off suicide precautions  MSSA 9 and 6 during shift. 5 mg valium given at 1710 for score of 9.  Moonlighter paged and patient started on bowel meds for constipation.       "

## 2021-03-27 NOTE — PROGRESS NOTES
03/27/21 0848   Quick Adds   Type of Visit Initial Occupational Therapy Evaluation   Living Environment   People in home alone;significant other  (SO on weekends only)   Current Living Arrangements house  (townhouse)   Home Accessibility stairs within home  (stairs to basement but does not use)   Transportation Anticipated family or friend will provide;car, drives self   Living Environment Comments walk-in shower with grab bars, RTS   Self-Care   Usual Activity Tolerance good   Current Activity Tolerance fair   Regular Exercise   (goes to the gym sometimes)   Equipment Currently Used at Home cane, straight   Activity/Exercise/Self-Care Comment IND w/ self-cares   Disability/Function   Hearing Difficulty or Deaf no   Wear Glasses or Blind yes   Vision Management reading glasses   Fall history within last six months yes  (not sure if fell as states she passed out)   General Information   Onset of Illness/Injury or Date of Surgery 03/23/21   Referring Physician Dr. Su   Patient/Family Therapy Goal Statement (OT) Walk without a walker and get stronger   Additional Occupational Profile Info/Pertinent History of Current Problem 75 year old female with a past medical history significant for alcohol abuse, alcoholic cirrhosis of liver, depression, anxiety, CAD, CKD stage III, HTN, severe aortic stenosis, diastolic heart failure  and GERD who is admitted for increasing  of alcohol use ( treatment of withdrawal)  and suicidal ideation   Existing Precautions/Restrictions no known precautions/restrictions   General Observations and Info Little shaky   Cognitive Status Examination   Orientation Status orientation to person, place and time   Sensory   Sensory Comments No N/T noted   Pain Assessment   Patient Currently in Pain Yes, see Vital Sign flowsheet  (pain in back)   Range of Motion Comprehensive   Comment, General Range of Motion BUE WFL   Strength Comprehensive (MMT)   Comment, General Manual Muscle Testing  (MMT) Assessment BUE weakness   Bed Mobility   Bed Mobility supine-sit;sit-supine   Supine-Sit [place] (Bed Mobility) independent   Sit-Supine [place] (Bed Mobility) independent   Transfers   Transfers sit-stand transfer;toilet transfer   Sit-Stand Transfer   Sit-Stand [place] (Transfers) independent   Toilet Transfer   [place] Level (Toilet Transfer) supervision   Assistive Device (Toilet Transfer) walker, standard   Toilet Transfer Comments commode overlay   Activities of Daily Living   BADL Assessment/Intervention lower body dressing;upper body dressing;grooming;feeding;toileting   Upper Body Dressing Assessment/Training   [place] Level (Upper Body Dressing) independent   Lower Body Dressing Assessment/Training   [place] Level (Lower Body Dressing) independent   Grooming Assessment/Training   [place] Level (Grooming) independent   Eating/Self Feeding   [place] Level (Feeding) independent   Toileting   [place] Level (Toileting) supervision   Clinical Impression   Criteria for Skilled Therapeutic Interventions Met (OT) yes   OT Diagnosis Impaired ADLs due to decreased strength/endurance   OT Problem List-Impairments impacting ADL strength;activity tolerance impaired   ADL comments/analysis SBA toilet transfer, g/h, LB dressing, IND bed mobility, low activity tolerance, quick fatigue, low strength   Assessment of Occupational Performance 1-3 Performance Deficits   Identified Performance Deficits functional mobility, bathing, dressing   Planned Therapy Interventions (OT) strengthening;ADL retraining   Clinical Decision Making Complexity (OT) low complexity   Therapy Frequency (OT) Daily   Predicted Duration of Therapy 3 days   Risk & Benefits of therapy have been explained evaluation/treatment results reviewed;care plan/treatment goals reviewed;risks/benefits reviewed;current/potential barriers reviewed;participants voiced agreement with care plan   OT Discharge Planning     OT Discharge Recommendation (DC Rec) Transitional Care Facility;Home with assist   OT Rationale for DC Rec Pt lives alone and currently withrawing- risk of relapsing if dc home too soon. Pt would benefit from additional therapy to increase overall strength and endurance prior to returning to OF.   OT Brief overview of current status  SBA toilet transfer, g/h, LB dressing, IND bed mobility, low activity tolerance, quick fatigue, low strength   Total Evaluation Time (Minutes)   Total Evaluation Time (Minutes) 8

## 2021-03-27 NOTE — PROGRESS NOTES
D: Pt  A/O x3.( slightly confused/forgetful at times.) VSS . Lungs- CL, no c/o chest pain/ SOB. sats= 95 % RA.. Bowel+, passing gas  NO BM as yet- ( long conversation with daughter stating pt is  A bit BOWEL OBSESSED- meds/sup given)  . PP- +. +1 edema- feet/legs. CMS- intact Pt is tremulous . Pt taking PO REG  food/ fluids well. Voiding Good amounts in BR. Has on attends.. Pain/CAPA - denies. MSSA= 3/ and 4 this shift.( other meds given for anxiety ) Pt sleepy this shift. Social work in contact with family as to placement.  A: con't to monitor. Call light in reach. Pt able to make needs known.

## 2021-03-27 NOTE — PROGRESS NOTES
[facility]    Medicine Progress Note - Hospitalist Service       Date of Admission:  3/23/2021  Assessment & Plan        75 year old female with a past medical history significant for alcohol abuse, alcoholic cirrhosis of liver, depression, anxiety, CAD, CKD stage III, HTN, severe aortic stenosis, diastolic heart failure  and GERD who is admitted for increasing  of alcohol use ( treatment of withdrawal)  and suicidal ideation.    # Acute Alcohol withdrawal  #Alcohol dependency    -MSSA protocol with valium as needed. Has needed 25 mg of Valium in the last 24 hrs. Still very weak and endorses visual hallucinations   - Patient mildly unsteady as a result of withdrawal and will continue to need supervised care   PT and Ot to assess mobility and safety today   -CD consult ordered and recommendations noted   -Continue on folic acid and multivitamin.   -Continue on thiamine.   Patient  has a referral to [facility] ( Only medicare residential treatment [facility]). However, there is a 2 week waiting list. Patient is high risk for relapse if discharged home and also not safe at this point. Options are to consider TCU vs daughter's residence after PT/OT evaluation and completely done with withdrawal        # Suicide ideation, resolved   -Psychiatry consult noted     Recommendations:  1) Recommend to discontinue 1:1 sitter and suicide precautions  2) Recommend to continue current psychotropic medication regimen  3) Recommend to offer quetiapine 25 mg PRN at bedtime for insomnia or hallucinations  4) Will discontinue bupropion. Continue citalopram 20 mg daily, mirtazapine 15 mg at bedtime, trazodone 100 mg at bedtime. These medications are not going to be effective while she is drinking heavily. Will not increase gabapentin secondary to her kidney disease.  5) Recommend residential CD treatment - CD consult has been ordered.            # Pulmonary edema   # Diastolic CHF   # Hx of  aortic valve stenosis    # CAD   # HTN  -She had a TTE 3/16/21 that showed normal EF. Left ventricular diastolic function is indeterminate. Normal left ventricular wall motion. Moderate to severe valvular aortic stenosis. There is mild to moderate (1-2+) mitral regurgitation. There is mild mitral stenosis.    - 2 doses of IV lasix with improvement in breathing  Resume home dose of lasix   -Continue on PTA ASA.   -Continue on PTA Atenolol     # CKD   -Cr 1.2, improved to 1.15 to 1.07  -Continue monitoring renal function and lytes.      # Anxiety   # Depression   -Continue on PTA antidepressants.      # Liver cirrhosis   -Continue monitoring LFT's . Appear within normal limits           Diet: Regular Diet Adult    DVT Prophylaxis: Pneumatic Compression Devices  Montelongo Catheter: not present  Code Status: Full Code           Disposition Plan   Expected discharge: 2 - 3 days, recommended to inpatinet alcohol treatment Vs detox unit Vs home  once patinet is completed with alcohol withdrawal .  Entered: Pallavi Crowell MD 03/27/2021, 9:44 AM       The patient's care was discussed with the Bedside Nurse, Care Coordinator/ and Patient.    Pallavi Crowell MD  Hospitalist Service  [facility]  Contact information available via [facility] Paging/Directory    ______________________________________________________________________    Interval History   Patient continues to endorse weakness and hallucinations   Also wants atarax prescribed, which she uses for anxiety   No fevers or chills   eating well. Passing gas  Discussed options for TCU vs daughter's house while spot opens up at [facility] for residential treatmnet        Data reviewed today: I reviewed all medications, new labs and imaging results over the last 24 hours. I personally reviewed no images or EKG's today.    Physical Exam   Vital Signs: Temp: 97.4  F (36.3  C) Temp src: Oral BP: 119/52  Pulse: 61   Resp: 16 SpO2: 95 % O2 Device: None (Room air)    Weight: 170 lbs 3.2 oz  General Appearance: Awake, alert and in mild distress  Respiratory: Bilateral crackles heard ( L >R)  Cardiovascular: Regular heart rate. Ejection systolic murmur heard   GI: Soft, non tender. Normal bowel sounds   Skin: No bruising or bleeding   Other: Awake, alert and orientated X 3. Coarse tremors    Data   Recent Labs   Lab 03/27/21  0649 03/25/21  0514 03/24/21  0525 03/23/21  1506 03/23/21  1506   WBC 5.6  --  5.6  --  6.9   HGB 10.7*  --  9.4*  --  10.1*   MCV 92  --  89  --  89     --  271  --  288    139 136  --  135   POTASSIUM 3.8 3.8 3.6   < > 3.9   CHLORIDE 106 102 101  --  98   CO2 27 33* 30  --  28   BUN 21 21 20  --  23   CR 0.94 1.07* 1.15*  --  1.22*   ANIONGAP 7 4 5  --  9   BIRGIT 9.1 9.0 8.6  --  8.8   GLC 96 94 96  --  114*   ALBUMIN 3.1* 3.3* 3.2*  --  3.6   PROTTOTAL 6.9 7.1 6.7*  --  7.3   BILITOTAL 0.3 0.2 0.3  --  0.3   ALKPHOS 54 62 59  --  63   ALT 12 16 13  --  14   AST 12 13 12  --  11   TROPI  --   --   --   --  <0.015    < > = values in this interval not displayed.

## 2021-03-27 NOTE — PLAN OF CARE
PT: HOLD - Per OT, IP therapy needs likely can be met by one discipline. Will hold until tomorrow to ensure pt is able to negotiate stairs safely with OT.

## 2021-03-27 NOTE — PLAN OF CARE
A&O x4. Pt continues to have elevated BPs in the 150s but otherwise VSS. Pt denies pain, nausea, SOB or any new concerns overnight. Lung sounds clear and equal bilateral. Pt given Valium per MSSA parameters overnight. Pt rated anxiety a 5 and depression a 6 this shift. Pt denied SI. Pt slept majority of shift. Call light within reach and pt able to make needs known.

## 2021-03-28 ENCOUNTER — APPOINTMENT (OUTPATIENT)
Dept: OCCUPATIONAL THERAPY | Facility: CLINIC | Age: 76
DRG: 897 | End: 2021-03-28
Payer: COMMERCIAL

## 2021-03-28 PROCEDURE — 250N000013 HC RX MED GY IP 250 OP 250 PS 637: Performed by: INTERNAL MEDICINE

## 2021-03-28 PROCEDURE — 250N000013 HC RX MED GY IP 250 OP 250 PS 637: Performed by: STUDENT IN AN ORGANIZED HEALTH CARE EDUCATION/TRAINING PROGRAM

## 2021-03-28 PROCEDURE — 97530 THERAPEUTIC ACTIVITIES: CPT | Mod: GO | Performed by: OCCUPATIONAL THERAPIST

## 2021-03-28 PROCEDURE — 120N000002 HC R&B MED SURG/OB UMMC

## 2021-03-28 PROCEDURE — 97535 SELF CARE MNGMENT TRAINING: CPT | Mod: GO | Performed by: OCCUPATIONAL THERAPIST

## 2021-03-28 PROCEDURE — 99207 PR CDG-CUT & PASTE-POTENTIAL IMPACT ON LEVEL: CPT | Performed by: INTERNAL MEDICINE

## 2021-03-28 PROCEDURE — 99232 SBSQ HOSP IP/OBS MODERATE 35: CPT | Performed by: INTERNAL MEDICINE

## 2021-03-28 RX ORDER — QUETIAPINE FUMARATE 50 MG/1
50 TABLET, FILM COATED ORAL 2 TIMES DAILY PRN
Status: DISCONTINUED | OUTPATIENT
Start: 2021-03-28 | End: 2021-03-29 | Stop reason: HOSPADM

## 2021-03-28 RX ORDER — ACETAMINOPHEN 325 MG/1
650 TABLET ORAL EVERY 4 HOURS PRN
Status: DISCONTINUED | OUTPATIENT
Start: 2021-03-28 | End: 2021-03-29 | Stop reason: HOSPADM

## 2021-03-28 RX ADMIN — POLYETHYLENE GLYCOL 3350 17 G: 17 POWDER, FOR SOLUTION ORAL at 21:25

## 2021-03-28 RX ADMIN — ACETAMINOPHEN 650 MG: 325 TABLET, FILM COATED ORAL at 18:59

## 2021-03-28 RX ADMIN — QUETIAPINE FUMARATE 50 MG: 50 TABLET ORAL at 13:13

## 2021-03-28 RX ADMIN — DOCUSATE SODIUM 50 MG AND SENNOSIDES 8.6 MG 1 TABLET: 8.6; 5 TABLET, FILM COATED ORAL at 07:47

## 2021-03-28 RX ADMIN — ASPIRIN 81 MG: 81 TABLET, COATED ORAL at 07:47

## 2021-03-28 RX ADMIN — FUROSEMIDE 20 MG: 20 TABLET ORAL at 07:47

## 2021-03-28 RX ADMIN — MULTIPLE VITAMINS W/ MINERALS TAB 1 TABLET: TAB at 07:46

## 2021-03-28 RX ADMIN — QUETIAPINE FUMARATE 50 MG: 50 TABLET ORAL at 22:42

## 2021-03-28 RX ADMIN — ATENOLOL 25 MG: 25 TABLET ORAL at 22:42

## 2021-03-28 RX ADMIN — THIAMINE HCL TAB 100 MG 100 MG: 100 TAB at 07:47

## 2021-03-28 RX ADMIN — HYDROXYZINE HYDROCHLORIDE 25 MG: 25 TABLET, FILM COATED ORAL at 07:55

## 2021-03-28 RX ADMIN — CITALOPRAM HYDROBROMIDE 20 MG: 20 TABLET ORAL at 07:46

## 2021-03-28 RX ADMIN — TRAZODONE HYDROCHLORIDE 100 MG: 50 TABLET ORAL at 22:41

## 2021-03-28 RX ADMIN — MIRTAZAPINE 15 MG: 15 TABLET, FILM COATED ORAL at 22:43

## 2021-03-28 RX ADMIN — ATENOLOL 25 MG: 25 TABLET ORAL at 07:47

## 2021-03-28 RX ADMIN — DOCUSATE SODIUM 50 MG AND SENNOSIDES 8.6 MG 1 TABLET: 8.6; 5 TABLET, FILM COATED ORAL at 21:25

## 2021-03-28 RX ADMIN — HYDROXYZINE HYDROCHLORIDE 25 MG: 25 TABLET, FILM COATED ORAL at 02:08

## 2021-03-28 RX ADMIN — FOLIC ACID 1 MG: 1 TABLET ORAL at 07:47

## 2021-03-28 RX ADMIN — GABAPENTIN 300 MG: 300 CAPSULE ORAL at 07:46

## 2021-03-28 RX ADMIN — HYDROXYZINE HYDROCHLORIDE 25 MG: 25 TABLET, FILM COATED ORAL at 22:41

## 2021-03-28 RX ADMIN — Medication 1 MG: at 22:41

## 2021-03-28 RX ADMIN — GABAPENTIN 300 MG: 300 CAPSULE ORAL at 13:13

## 2021-03-28 RX ADMIN — GABAPENTIN 300 MG: 300 CAPSULE ORAL at 22:41

## 2021-03-28 NOTE — PLAN OF CARE
VS:   Temp: 95.7  F (35.4  C) Temp src: Oral BP: 90/47 Pulse: 71   Resp: 22 SpO2: 96 % O2 Device: None (Room air)    Output:   Patient voids spontaneously without difficulty. LBM 3/27/21. Patient took bowel meds this evening as well.    Activity:   Patient independent in the room. Uses walker with SBA to ambulate in the flores.    Skin: Intact.    Pain:   Patient reports some chronic back pain. Tylenol ordered and administered.    Neuro/CMS:   Cms intact. Patient A&O X4.    Diet:   Regular diet. Patient stated she had a late lunch and refused dinner.    LDA: PIV was unable to flush at end of shift so writer removed it. No IV access and patient does not have any IV medications ordered.    Equipment:   Walker at bedside.    Plan:   Patient to go to treatment facility - likely discharge tomorrow pending placement.    Additional Info:   MSSA discontinued. Patient denies any symptoms of withdrawal throughout shift. Writer also did not assess any symptoms indicating active withdrawal.  PRN atarax given X1. PRN seraquil given X1 and PRN melatonin given X1 per patient request.

## 2021-03-28 NOTE — PLAN OF CARE
"      VS: /64 (BP Location: Left arm)   Pulse 64   Temp 98.3  F (36.8  C) (Oral)   Resp 18   Ht 1.626 m (5' 4\")   Wt 77.2 kg (170 lb 3.2 oz)   SpO2 96%   BMI 29.21 kg/m    O2: RA  Cont pulse ox   Output: Voiding spontaneously without difficulty. LBM 3/27. BS active x4.   Lungs: CTA. Denies cough/CP/SOB.   Activity: Up w/ SBA, walker, and gait belt. Ambulated to BR overnight.    Skin: WDL   Pain:   Denies. PRN robaxin given x1 this shift.     Neuro/CMS:   A&Ox4, CMS intact, denies N/T   Dressing(s):   None   Diet:   Regular diet, good appetite.   LDA:   L PIV SL   Equipment:   IV pole, walker, gait belt   Plan:   Continue to follow POC.   Additional Info:   MSSA @ 0130 = 5 - no valium given this shift. Pt reports visual hallucinations and has slight tremors.        "

## 2021-03-28 NOTE — PROGRESS NOTES
D: Pt  A/O x3.. VSS . Lungs- CL, no c/o chest pain/ SOB. sats= 94 % RA.. Bowel+, passing gas  BM - yesterday ( long conversation with daughter stating pt is  A bit BOWEL OBSESSED- meds given)  . PP- +. +1 edema- feet/legs. CMS- intact Pt is tremulous but resolving . Pt taking PO REG  food/ fluids well. Voiding Good amounts in BR. Has on attends.. Pain/CAPA - denies. MSSA= DCd.( other meds given for anxiety ) Pt sleepy this shift. Social work in contact with family as to placement.Walked halls this shift with P.T.  A: con't to monitor. Call light in reach. Pt able to make needs known.

## 2021-03-28 NOTE — PROGRESS NOTES
[facility]    Medicine Progress Note - Hospitalist Service       Date of Admission:  3/23/2021  Assessment & Plan        75 year old female with a past medical history significant for alcohol abuse, alcoholic cirrhosis of liver, depression, anxiety, CAD, CKD stage III, HTN, severe aortic stenosis, diastolic heart failure  and GERD who is admitted for increasing  of alcohol use ( treatment of withdrawal)  and suicidal ideation.    # Acute Alcohol withdrawal  #Alcohol dependency    -MSSA protocol with valium as needed. As of 3/28, it appears that patient has completed her withdrawal symptoms   -Continues to be weak and unsteady with mobility  PT and Ot to continue to mobility and safety today   -CD consult ordered and recommendations noted   -Continue on folic acid and multivitamin.   -Continue on thiamine.   Patient  has a referral to [facility] ( Only medicare residential treatment [facility]). However, there is a 2 week waiting list. Patient is high risk for relapse if discharged home and also not safe at this point. Options are to consider TCU vs daughter's residence after PT/OT evaluation and completely done with withdrawal        # Suicide ideation, resolved   -Psychiatry consult noted     Recommendations:  1) Recommend to discontinue 1:1 sitter and suicide precautions  2) Recommend to continue current psychotropic medication regimen  3) Recommend to offer quetiapine 25 mg PRN at bedtime for insomnia or hallucinations  4) Will discontinue bupropion. Continue citalopram 20 mg daily, mirtazapine 15 mg at bedtime, trazodone 100 mg at bedtime. These medications are not going to be effective while she is drinking heavily. Will not increase gabapentin secondary to her kidney disease.  5) Recommend residential CD treatment - CD consult has been ordered.            # Pulmonary edema   # Diastolic CHF   # Hx of aortic valve stenosis    # CAD   # HTN  -She had a TTE 3/16/21  that showed normal EF. Left ventricular diastolic function is indeterminate. Normal left ventricular wall motion. Moderate to severe valvular aortic stenosis. There is mild to moderate (1-2+) mitral regurgitation. There is mild mitral stenosis.    - 2 doses of IV lasix with improvement in breathing  Resume home dose of lasix   -Continue on PTA ASA.   -Continue on PTA Atenolol ( increased to BID dose because of persistent HTN during evening/nighttime)      # CKD   -Cr 1.2, improved to 1.15 to 1.07  -Continue monitoring renal function and lytes.      # Anxiety   # Depression   -Continue on PTA antidepressants.      # Liver cirrhosis   -Continue monitoring LFT's . Appear within normal limits           Diet: Regular Diet Adult    DVT Prophylaxis: Pneumatic Compression Devices  Montelongo Catheter: not present  Code Status: Full Code           Disposition Plan   Expected discharge: 2 - 3 days, recommended to inpatinet alcohol treatment Vs detox unit Vs home  once patinet is completed with alcohol withdrawal .  Entered: Pallavi Crowell MD 03/28/2021, 8:34 AM       The patient's care was discussed with the Bedside Nurse, Care Coordinator/ and Patient.    Pallavi Crowell MD  Hospitalist Service  [facility]  Contact information available via [facility] Paging/Directory    ______________________________________________________________________    Interval History   Patient feels well this morning. Thinks that she may be better off to go to a TCU  Awaiting a complete PT/OT evaluation   Denies chest pain or SOB  Denies hallucinations this morning  Had a bowel movement after multiple bowel meds and fleets enema         Data reviewed today: I reviewed all medications, new labs and imaging results over the last 24 hours. I personally reviewed no images or EKG's today.    Physical Exam   Vital Signs: Temp: 97.8  F (36.6  C) Temp src: Oral BP: 119/56 Pulse: 63    Resp: 18 SpO2: 94 % O2 Device: None (Room air)    Weight: 170 lbs 3.2 oz  General Appearance: Awake, alert and in mild distress  Respiratory: Bilateral crackles heard ( L >R)  Cardiovascular: Regular heart rate. Ejection systolic murmur heard   GI: Soft, non tender. Normal bowel sounds   Skin: No bruising or bleeding   Other: Awake, alert and orientated X 3. Coarse tremors are now resolved     Data   Recent Labs   Lab 03/27/21  0649 03/25/21  0514 03/24/21  0525 03/23/21  1506 03/23/21  1506   WBC 5.6  --  5.6  --  6.9   HGB 10.7*  --  9.4*  --  10.1*   MCV 92  --  89  --  89     --  271  --  288    139 136  --  135   POTASSIUM 3.8 3.8 3.6   < > 3.9   CHLORIDE 106 102 101  --  98   CO2 27 33* 30  --  28   BUN 21 21 20  --  23   CR 0.94 1.07* 1.15*  --  1.22*   ANIONGAP 7 4 5  --  9   BIRGIT 9.1 9.0 8.6  --  8.8   GLC 96 94 96  --  114*   ALBUMIN 3.1* 3.3* 3.2*  --  3.6   PROTTOTAL 6.9 7.1 6.7*  --  7.3   BILITOTAL 0.3 0.2 0.3  --  0.3   ALKPHOS 54 62 59  --  63   ALT 12 16 13  --  14   AST 12 13 12  --  11   TROPI  --   --   --   --  <0.015    < > = values in this interval not displayed.

## 2021-03-28 NOTE — PLAN OF CARE
"      VS:   /64 (BP Location: Left arm)   Pulse 64   Temp 98.3  F (36.8  C) (Oral)   Resp 18   Ht 1.626 m (5' 4\")   Wt 77.2 kg (170 lb 3.2 oz)   SpO2 97%   BMI 29.21 kg/m   RA   Output:   Patient up to bathroom SBA voiding without difficulty.   LBM 3/27. Patient had fleets enema and suppository prior to BM. Daughter did report to AM nurse patient does tend to have a bowel obsession.    Activity:   Patient up with walker and gait belt SBA.   Skin: WDL   Pain:   Patient denied pain during shift.    Neuro/CMS:   A&Ox4, no N/T reported, strengths intact.   Dressing(s):   NA   Diet:   Regular diet tolerating well.   LDA:   PIV L AC.   Equipment:   IV pole, walker, gait belt, call light.   Plan:   Continue to monitor, continue plan of care, continue MSSA protocol and monitor for withdrawal.   Additional Info:   Family working with  to find placement for patient,  MSSA 8 and 3 during shift, 5 mg valium given at 1815.  Daughter did report to AM nurse patient does tend to have a bowel obsession.   Paged moonlighter for scheduled evening miralax at patient request. Order added to MAR.      "

## 2021-03-29 ENCOUNTER — TRANSFERRED RECORDS (OUTPATIENT)
Dept: HEALTH INFORMATION MANAGEMENT | Facility: CLINIC | Age: 76
End: 2021-03-29

## 2021-03-29 ENCOUNTER — APPOINTMENT (OUTPATIENT)
Dept: OCCUPATIONAL THERAPY | Facility: CLINIC | Age: 76
DRG: 897 | End: 2021-03-29
Payer: COMMERCIAL

## 2021-03-29 VITALS
DIASTOLIC BLOOD PRESSURE: 60 MMHG | TEMPERATURE: 97.3 F | SYSTOLIC BLOOD PRESSURE: 128 MMHG | HEART RATE: 69 BPM | OXYGEN SATURATION: 96 % | BODY MASS INDEX: 29.06 KG/M2 | WEIGHT: 170.2 LBS | RESPIRATION RATE: 20 BRPM | HEIGHT: 64 IN

## 2021-03-29 PROCEDURE — 250N000013 HC RX MED GY IP 250 OP 250 PS 637: Performed by: INTERNAL MEDICINE

## 2021-03-29 PROCEDURE — 97110 THERAPEUTIC EXERCISES: CPT | Mod: GO | Performed by: OCCUPATIONAL THERAPIST

## 2021-03-29 PROCEDURE — 99239 HOSP IP/OBS DSCHRG MGMT >30: CPT | Performed by: INTERNAL MEDICINE

## 2021-03-29 RX ORDER — POLYETHYLENE GLYCOL 3350 17 G/17G
17 POWDER, FOR SOLUTION ORAL 2 TIMES DAILY
DISCHARGE
Start: 2021-03-29 | End: 2021-12-17

## 2021-03-29 RX ORDER — AMOXICILLIN 250 MG
1 CAPSULE ORAL 2 TIMES DAILY
DISCHARGE
Start: 2021-03-29 | End: 2022-08-08

## 2021-03-29 RX ORDER — ATENOLOL 25 MG/1
25 TABLET ORAL DAILY
Qty: 100 TABLET | Refills: 4 | DISCHARGE
Start: 2021-03-29 | End: 2021-04-26

## 2021-03-29 RX ORDER — QUETIAPINE FUMARATE 50 MG/1
50 TABLET, FILM COATED ORAL 2 TIMES DAILY PRN
DISCHARGE
Start: 2021-03-29 | End: 2021-05-12

## 2021-03-29 RX ORDER — ATENOLOL 25 MG/1
25 TABLET ORAL 2 TIMES DAILY
Qty: 100 TABLET | Refills: 4 | DISCHARGE
Start: 2021-03-29 | End: 2021-03-29

## 2021-03-29 RX ORDER — LANOLIN ALCOHOL/MO/W.PET/CERES
100 CREAM (GRAM) TOPICAL DAILY
Status: ON HOLD | DISCHARGE
Start: 2021-03-30 | End: 2021-12-23

## 2021-03-29 RX ADMIN — FUROSEMIDE 20 MG: 20 TABLET ORAL at 08:16

## 2021-03-29 RX ADMIN — GABAPENTIN 300 MG: 300 CAPSULE ORAL at 08:16

## 2021-03-29 RX ADMIN — CITALOPRAM HYDROBROMIDE 20 MG: 20 TABLET ORAL at 08:16

## 2021-03-29 RX ADMIN — MULTIPLE VITAMINS W/ MINERALS TAB 1 TABLET: TAB at 08:16

## 2021-03-29 RX ADMIN — ASPIRIN 81 MG: 81 TABLET, COATED ORAL at 08:15

## 2021-03-29 RX ADMIN — THIAMINE HCL TAB 100 MG 100 MG: 100 TAB at 08:16

## 2021-03-29 RX ADMIN — FOLIC ACID 1 MG: 1 TABLET ORAL at 08:16

## 2021-03-29 RX ADMIN — DOCUSATE SODIUM 50 MG AND SENNOSIDES 8.6 MG 1 TABLET: 8.6; 5 TABLET, FILM COATED ORAL at 08:16

## 2021-03-29 NOTE — PLAN OF CARE
Occupational Therapy Discharge Summary    Reason for therapy discharge:    Discharged to CD treatment.    Progress towards therapy goal(s). See goals on Care Plan in [facility] electronic health record for goal details.  Goals partially met.  Barriers to achieving goals:   discharge from facility.    Therapy recommendation(s):    Continued therapy is recommended.  Rationale/Recommendations:  agree with CD treatment.

## 2021-03-29 NOTE — PROGRESS NOTES
Care Management Follow Up    Length of Stay (days): 6    Expected Discharge Date: 03/29/2021     Concerns to be Addressed: discharge planning     Patient plan of care discussed at interdisciplinary rounds: Yes    Anticipated Discharge Disposition: [address] inpatient CD treatment, [address], [place] [address], ph: ph: [phone], fax: [phone] - today     Anticipated Discharge Services: [facility]  Anticipated Discharge DME: None    Patient/family educated on Medicare website which has current facility and service quality ratings: yes  Education Provided on the Discharge Plan:  yes  Patient/Family in Agreement with the Plan: yes    Referrals Placed by CM/SW:  See below  Private pay costs discussed: transportation costs    Additional Information:  Per team rounds, pt is medically ready for discharge, but should not discharge home alone due to substance use and de-conditioning - PT is recommending TCU, but pt could also potentially stay with her dtr, Nery Katherine (ph: [phone]). Pt has been referred to [address] substance abuse inpatient program - but they do not have open beds for 1-2 weeks.     RACHAEL met with the pt at bedside and she put her dtr Nery on the speaker phone.  The pt and Nery would both prefer that the pt discharge to TCU.  RACHAEL provided the pt with a list of TCUs from Medicare.gov.  Pt and Nery requested referrals to [facility] called [address] [facility] program (ph: [phone], fax: [phone]) and spoke with Gisselle in admissions - they had a bunch of unplanned discharges over the weekend so they can take the pt today 3/29 before 4-5 pm - they do NOT need a COVID-19 test before some comes, they will do a new one upon admission.  Gisselle confirmed that they are fine with the pt going home briefly today to pack a bag and then admit to their unit.     RACHAEL spoke with pt Linda - she confirmed that she also talked to Gisselle  in admissions and she was very happy to hear that she is accepted.  Her dtr Nery is going to pick her up as soon as the doctor is able to discharge her (Nery can be here at 11:30 am) - Linda plans to have her dtr drive her home so she can pack a bag and then they will head to [facility] for admission.     RACHAEL informed pt's bedside nurse Rossy ([id number]) regarding discharge.  RACHAEL also informed Dr. Olivo - he will do her discharge paperwork.    ADDEND 1237: RACHAEL rec'd call from Gisselle at [address] (contact above), asking SW to fax over her face sheet with the insurance information on it because they were having trouble updating her insurance info in their system.  RACHAEL faxed via Epic.       TRISTIAN Wagner  [facility]  [address]   Ph: [phone]  Pager: [phone]

## 2021-03-29 NOTE — PLAN OF CARE
"      VS:   /60 (BP Location: Left arm)   Pulse 69   Temp 97.3  F (36.3  C) (Oral)   Resp 20   Ht 1.626 m (5' 4\")   Wt 77.2 kg (170 lb 3.2 oz)   SpO2 96%   BMI 29.21 kg/m    VSS. RA.    Output:   Voiding without difficulty. LBM 3/29   Activity:   Independent in room.   Skin: Intact   Pain:   Denies pain   Neuro/CMS:   A/Ox4. CMS intact.   Dressing(s):   None.   Diet:   Regular diet, tolerating.   LDA:   No IV access.   Equipment:   Call light.    Plan:   Continue to monitor and follow plan of care. Plans to discharge today to [facility]. (Dc'd home with daughter to pack and go to treatment @1pm)   Additional Info:          "

## 2021-03-29 NOTE — DISCHARGE SUMMARY
[facility]  Hospitalist Discharge Summary      Date of Admission:  3/23/2021  Date of Discharge:  3/29/2021  Discharging Provider: Pallavi Crowell MD      Discharge Diagnoses   # Acute Alcohol withdrawal  #Alcohol dependency    # Suicide ideation, resolved   # Pulmonary edema   # Diastolic CHF   # Hx of aortic valve stenosis    # CAD   # HTN  # CKD   # Anxiety   # Depression   # Liver cirrhosis     Follow-ups Needed After Discharge   Follow-up Appointments     Follow Up and recommended labs and tests      Patient to follow with residential inpatient treatment for alcohol use at   [facility]  Weekly BMP and CBC                 Discharge Disposition   Transferred to [facility] for residential treatment for alcohol dependency   Condition at discharge: Stable      Hospital Course   75 year old female with a past medical history significant for alcohol abuse, alcoholic cirrhosis of liver, depression, anxiety, CAD, CKD stage III, HTN, severe aortic stenosis, diastolic heart failure  and GERD who is admitted for increasing  of alcohol use ( treatment of withdrawal)  and suicidal ideation.     # Acute Alcohol withdrawal  #Alcohol dependency    -MSSA protocol with valium as needed. As of 3/28, it appears that patient has completed her withdrawal symptoms   -Continues to be weak and unsteady with mobility  PT and OT  Evaluated for mobility and safety . Patient now safe and independent with walker   -CD consult ordered and recommendations noted   -Continue on folic acid and multivitamin.   -Continue on thiamine.   Patient  has a referral to [facility]  And was fortunate to be accept there earlier than predicted.        # Suicide ideation, resolved   -Psychiatry consult noted      Recommendations:  1) Recommend to discontinue 1:1 sitter and suicide precautions  2) Recommend to continue current psychotropic medication regimen  3) Recommend to offer quetiapine  25 mg PRN at bedtime for insomnia or hallucinations  4) Will discontinue bupropion. Continue citalopram 20 mg daily, mirtazapine 15 mg at bedtime, trazodone 100 mg at bedtime. These medications are not going to be effective while she is drinking heavily. Will not increase gabapentin secondary to her kidney disease.  5) Recommend residential CD treatment - CD consult has been ordered.               # Pulmonary edema   # Diastolic CHF   # Hx of aortic valve stenosis    # CAD   # HTN  -She had a TTE 3/16/21 that showed normal EF. Left ventricular diastolic function is indeterminate. Normal left ventricular wall motion. Moderate to severe valvular aortic stenosis. There is mild to moderate (1-2+) mitral regurgitation. There is mild mitral stenosis.    - 2 doses of IV lasix with improvement in breathing  Resume home dose of lasix   -Continue on PTA ASA.   -Continue on PTA Atenolol   - Resume home dose of losartan at discharge as Creatinine returned to baseline      # CKD   -Cr 1.2, improved to 1.15 to 1.07  -Continue monitoring renal function and lytes.      # Anxiety   # Depression   -Continue on PTA antidepressants.      # Liver cirrhosis   -Continue monitoring LFT's . Appear within normal limits                          Consultations This Hospital Stay   MEDICATION HISTORY IP PHARMACY CONSULT  PSYCHIATRY IP CONSULT  CHEMICAL DEPENDENCY IP CONSULT  PSYCHIATRY IP CONSULT  CARE MANAGEMENT / SOCIAL WORK IP CONSULT  PHYSICAL THERAPY ADULT IP CONSULT  OCCUPATIONAL THERAPY ADULT IP CONSULT  PHYSICAL THERAPY ADULT IP CONSULT  OCCUPATIONAL THERAPY ADULT IP CONSULT    Code Status   Full Code    Time Spent on this Encounter   IPallavi MD, personally saw the patient today and spent greater than 30 minutes discharging this patient.       Pallavi Crowell MD  [facility] [address]  [address] [place] [id number]  Phone: [phone]  Fax:  [phone]  ______________________________________________________________________    Physical Exam   Vital Signs: Temp: 97.3  F (36.3  C) Temp src: Oral BP: 128/60 Pulse: 69   Resp: 20 SpO2: 96 % O2 Device: None (Room air)    Weight: 170 lbs 3.2 oz  General Appearance: Awake, alert and not in distress  Respiratory: Clear breath sounds bilaterally   Cardiovascular: Ejection systolic murmur heard  GI: Soft, non tender. Normal bowel sounds   Skin: No bruising or bleeding   Other: Awake, alert and orientated X 3. Resolution of withdrawal symptoms        Primary Care Physician   Nayeli Castorena    Discharge Orders      Reason for your hospital stay    Acute alcohol withdrawal syndrome     Activity - Up with assistive device     Encourage PO fluids     Follow Up and recommended labs and tests    Patient to follow with residential inpatient treatment for alcohol use at [facility]  Weekly BMP and CBC     Full Code     Physical Therapy Adult Consult    Evaluate and treat as clinically indicated.    Reason:  Deconditioning     Occupational Therapy Adult Consult    Evaluate and treat as clinically indicated.    Reason:  Deconditioning     Pneumatic Compression Device     Bilateral calf. Remove 30 mins BID.     Advance Diet as Tolerated    Follow this diet upon discharge: Orders Placed This Encounter      Regular Diet Adult       Significant Results and Procedures   Results for orders placed or performed during the hospital encounter of 03/23/21   XR Chest 2 Views    Narrative    CHEST TWO VIEWS 3/23/2021 5:47 PM     HISTORY: SOB.    COMPARISON: 2/13/2020    FINDINGS: The heart is enlarged. There is pulmonary vascular  prominence but no interstitial edema or pleural effusion. No  pneumothorax or significant airspace consolidation.       Impression    IMPRESSION: Cardiomegaly and vascular prominence suggestive of fluid  overload/early CHF.     KELLEY MOORE MD     *Note: Due to a large number of results and/or  encounters for the requested time period, some results have not been displayed. A complete set of results can be found in Results Review.       Discharge Medications   Current Discharge Medication List      START taking these medications    Details   polyethylene glycol (MIRALAX) 17 g packet Take 17 g by mouth 2 times daily    Associated Diagnoses: Slow transit constipation      QUEtiapine (SEROQUEL) 50 MG tablet Take 1 tablet (50 mg) by mouth 2 times daily as needed (Anxiety, insomnia, hallucinations)  Qty:      Associated Diagnoses: Alcohol withdrawal syndrome without complication (H)      senna-docusate (SENOKOT-S/PERICOLACE) 8.6-50 MG tablet Take 1 tablet by mouth 2 times daily  Qty:      Associated Diagnoses: Slow transit constipation      thiamine (B-1) 100 MG tablet Take 1 tablet (100 mg) by mouth daily  Qty:      Associated Diagnoses: Alcohol withdrawal syndrome without complication (H)         CONTINUE these medications which have CHANGED    Details   atenolol (TENORMIN) 25 MG tablet Take 1 tablet (25 mg) by mouth daily  Qty: 100 tablet, Refills: 4    Associated Diagnoses: Essential hypertension         CONTINUE these medications which have NOT CHANGED    Details   aspirin 81 MG EC tablet Take 81 mg by mouth daily      citalopram (CELEXA) 20 MG tablet Take 1 tablet (20 mg) by mouth daily  Qty: 90 tablet, Refills: 1    Associated Diagnoses: Mild major depression (H); Anxiety and depression; Dysthymia; Anxiety      folic acid (FOLVITE) 1 MG tablet Take 1 mg by mouth daily      furosemide (LASIX) 20 MG tablet Take 1 tablet (20 mg) by mouth daily TAKE 1 TABLET DAILY  Qty: 100 tablet, Refills: 4      gabapentin (NEURONTIN) 300 MG capsule Take 1 capsule (300 mg) by mouth 3 times daily  Qty: 270 capsule, Refills: 1    Associated Diagnoses: Chronic pain syndrome      hydrOXYzine (ATARAX) 25 MG tablet Take 1 tablet (25 mg) by mouth every 6 hours  Qty: 90 tablet, Refills: 1    Associated Diagnoses: Anxiety and  depression; Mild major depression (H); Dysthymia; Anxiety; Chronic insomnia      losartan (COZAAR) 25 MG tablet Take 1 tablet (25 mg) by mouth every morning  Qty: 30 tablet, Refills: 3    Associated Diagnoses: Benign essential hypertension      mirtazapine (REMERON) 15 MG tablet Take 1 tablet (15 mg) by mouth At Bedtime  Qty: 90 tablet, Refills: 1    Associated Diagnoses: Dysthymia; Mild major depression (H); Anxiety and depression; Anxiety; Chronic insomnia      Multiple Vitamins-Minerals (CENTRUM SILVER) per tablet Take 1 tablet by mouth daily      traZODone (DESYREL) 100 MG tablet Take 1 tablet (100 mg) by mouth At Bedtime  Qty: 90 tablet, Refills: 1    Associated Diagnoses: Chronic insomnia      valACYclovir (VALTREX) 1000 mg tablet TAKE 2 TABS BY MOUTH EVERY 12 HOURS FOR 1 DAY ONLY FOR OUTBREAKS OF COLD SORES AS NEEDED  Qty: 4 tablet, Refills: 3    Associated Diagnoses: Cold sore         STOP taking these medications       baclofen (LIORESAL) 10 MG tablet Comments:   Reason for Stopping:         buPROPion (WELLBUTRIN XL) 150 MG 24 hr tablet Comments:   Reason for Stopping:             Allergies   Allergies   Allergen Reactions     Bactrim [Sulfamethoxazole W/Trimethoprim] Hives     Codeine Itching     NAUSEA     Morphine Itching     NAUSEA

## 2021-03-29 NOTE — PLAN OF CARE
"      VS: /60 (BP Location: Left arm)   Pulse 69   Temp 97.3  F (36.3  C) (Oral)   Resp 20   Ht 1.626 m (5' 4\")   Wt 77.2 kg (170 lb 3.2 oz)   SpO2 96%   BMI 29.21 kg/m      Output: Pt voiding spontaneously without difficult. Last BM 3/27   Lungs: Clear   Activity: Pt independent in room.    Skin: Intact   Pain:   Denies any pain   Neuro/CMS:   A&Ox4, cms intact   Dressing(s):   none   Diet:   Regular diet.   LDA:   none   Plan:   Pt to go to treatment facility with discharge possible today.       "

## 2021-03-30 ENCOUNTER — TRANSFERRED RECORDS (OUTPATIENT)
Dept: HEALTH INFORMATION MANAGEMENT | Facility: CLINIC | Age: 76
End: 2021-03-30

## 2021-03-30 LAB
ALT SERPL-CCNC: 16 IU/L (ref 8–45)
AST SERPL-CCNC: 20 IU/L (ref 2–40)
INR PPP: 0.9

## 2021-04-07 DIAGNOSIS — F41.9 ANXIETY: Primary | ICD-10-CM

## 2021-04-08 NOTE — TELEPHONE ENCOUNTER
LOV: 3/3/2021     buPROPion (WELLBUTRIN XL) 150 MG 24 hr tablet (Discontinued) 90 tablet 1 3/3/2021 3/29/2021 No   Sig - Route: Take 1 tablet (150 mg) by mouth every morning - Oral   Sent to pharmacy as: buPROPion HCl ER (XL) 150 MG Oral Tablet Extended Release 24 Hour (WELLBUTRIN XL)       This Order Has Been Discontinued    Order Status Reason By On   Discontinued Stop at Discharge Misael Robles MD 3/29/21 1112     Routing refill request to provider for review/approval because:  Drug not active on patient's medication list      Lynn Vera RN  [facility]

## 2021-04-09 RX ORDER — BUPROPION HYDROCHLORIDE 150 MG/1
TABLET ORAL
Qty: 90 TABLET | Refills: 1 | Status: SHIPPED | OUTPATIENT
Start: 2021-04-09 | End: 2021-05-12

## 2021-04-09 NOTE — TELEPHONE ENCOUNTER
LOV: 3/3/2021  OV Notes:   Mild major depression (H)  Anxiety and depression  Dysthymia  Anxiety  Poorly controlled due to ETOH.  Continue medications and will adjust one we address sobriety in more detail.  Pt desires no med changes at this time.  -     mirtazapine (REMERON) 15 MG tablet; Take 1 tablet (15 mg) by mouth At Bedtime  -     hydrOXYzine (ATARAX) 25 MG tablet; Take 1 tablet (25 mg) by mouth every 6 hours  -     citalopram (CELEXA) 20 MG tablet; Take 1 tablet (20 mg) by mouth daily  -     buPROPion (WELLBUTRIN XL) 150 MG 24 hr tablet; Take 1 tablet (150 mg) by mouth every morning  -     atenolol (TENORMIN) 25 MG tablet; Take 1 tablet (25 mg) by mouth daily    Rx sent      Sara Williamson RN  [facility]

## 2021-04-18 LAB
CREAT SERPL-MCNC: 1.17 MG/DL (ref 0.57–1.11)
GFR SERPL CREATININE-BSD FRML MDRD: 45 ML/MIN/1.73M2
GLUCOSE SERPL-MCNC: 91 MG/DL (ref 65–100)
POTASSIUM SERPL-SCNC: 4.6 MMOL/L (ref 3.5–5)

## 2021-04-21 ENCOUNTER — TELEPHONE (OUTPATIENT)
Dept: FAMILY MEDICINE | Facility: CLINIC | Age: 76
End: 2021-04-21

## 2021-04-21 NOTE — TELEPHONE ENCOUNTER
Called patient to let her know Nayeli will do it at the time of her appt on 4/29       Essence Elizondo

## 2021-04-21 NOTE — TELEPHONE ENCOUNTER
Patient wants to know if you would do a cortizone injection in her middle finger of her right hand.  Stiff if bends she has to use the other hand to straighten it out     Please let me know and I will call patient to let her know, otherwise she says she can see her ortho to doi it    .  Essence Elizondo

## 2021-04-21 NOTE — TELEPHONE ENCOUNTER
That is a trigger finger injection - can schedule anytime with me      Nayeli Castorena MBA, MS, PA-C

## 2021-04-23 DIAGNOSIS — I10 ESSENTIAL HYPERTENSION: ICD-10-CM

## 2021-04-26 RX ORDER — ATENOLOL 25 MG/1
TABLET ORAL
Qty: 90 TABLET | Refills: 0 | Status: SHIPPED | OUTPATIENT
Start: 2021-04-26 | End: 2021-06-28

## 2021-04-26 NOTE — TELEPHONE ENCOUNTER
This was prescribed by Pallavi Crowell MD    Outpatient Medication Detail   Disp Refills Start End RIKKI   atenolol (TENORMIN) 25 MG tablet 100 tablet 4 3/29/2021  No   Sig - Route: Take 1 tablet (25 mg) by mouth daily - Oral   Class: Transitional     Need to verify med and who will be prescribing (us or cardiology)    Called patient @ [phone] -     Patient verified she is taking atenolol 25mg tabs - 1 tab PO daily.   Patient requesting PCP take over filling this med.     Routing refill request to provider for review/approval because:  Medication is reported/historical          Sara Williamson RN  [facility]

## 2021-05-12 ENCOUNTER — OFFICE VISIT (OUTPATIENT)
Dept: FAMILY MEDICINE | Facility: CLINIC | Age: 76
End: 2021-05-12
Payer: COMMERCIAL

## 2021-05-12 VITALS
HEIGHT: 64 IN | DIASTOLIC BLOOD PRESSURE: 70 MMHG | SYSTOLIC BLOOD PRESSURE: 110 MMHG | WEIGHT: 176 LBS | OXYGEN SATURATION: 98 % | TEMPERATURE: 97 F | HEART RATE: 71 BPM | RESPIRATION RATE: 16 BRPM | BODY MASS INDEX: 30.05 KG/M2

## 2021-05-12 DIAGNOSIS — I77.810 MILD ASCENDING AORTA DILATATION (H): ICD-10-CM

## 2021-05-12 DIAGNOSIS — I35.0 NONRHEUMATIC AORTIC VALVE STENOSIS: ICD-10-CM

## 2021-05-12 DIAGNOSIS — M62.838 MUSCLE SPASM: ICD-10-CM

## 2021-05-12 DIAGNOSIS — I10 BENIGN ESSENTIAL HYPERTENSION: ICD-10-CM

## 2021-05-12 DIAGNOSIS — F41.9 ANXIETY: ICD-10-CM

## 2021-05-12 DIAGNOSIS — F10.20 ALCOHOL USE DISORDER, SEVERE, DEPENDENCE (H): ICD-10-CM

## 2021-05-12 DIAGNOSIS — F10.21 ALCOHOL USE DISORDER, SEVERE, IN EARLY REMISSION (H): Primary | ICD-10-CM

## 2021-05-12 DIAGNOSIS — N18.31 STAGE 3A CHRONIC KIDNEY DISEASE (H): ICD-10-CM

## 2021-05-12 DIAGNOSIS — F51.04 CHRONIC INSOMNIA: ICD-10-CM

## 2021-05-12 DIAGNOSIS — M65.331 TRIGGER FINGER, RIGHT MIDDLE FINGER: ICD-10-CM

## 2021-05-12 DIAGNOSIS — B00.1 COLD SORE: ICD-10-CM

## 2021-05-12 DIAGNOSIS — F32.0 MILD MAJOR DEPRESSION (H): ICD-10-CM

## 2021-05-12 PROBLEM — F34.1 DYSTHYMIA: Status: RESOLVED | Noted: 2017-07-14 | Resolved: 2021-05-12

## 2021-05-12 PROBLEM — F32.A ANXIETY AND DEPRESSION: Status: RESOLVED | Noted: 2018-08-03 | Resolved: 2021-05-12

## 2021-05-12 LAB
ANION GAP SERPL CALCULATED.3IONS-SCNC: 3 MMOL/L (ref 3–14)
BUN SERPL-MCNC: 15 MG/DL (ref 7–30)
CALCIUM SERPL-MCNC: 9.4 MG/DL (ref 8.5–10.1)
CHLORIDE SERPL-SCNC: 107 MMOL/L (ref 94–109)
CO2 SERPL-SCNC: 30 MMOL/L (ref 20–32)
CREAT SERPL-MCNC: 1 MG/DL (ref 0.52–1.04)
GFR SERPL CREATININE-BSD FRML MDRD: 54 ML/MIN/{1.73_M2}
GLUCOSE SERPL-MCNC: 104 MG/DL (ref 70–99)
POTASSIUM SERPL-SCNC: 4 MMOL/L (ref 3.4–5.3)
SODIUM SERPL-SCNC: 140 MMOL/L (ref 133–144)

## 2021-05-12 PROCEDURE — 99215 OFFICE O/P EST HI 40 MIN: CPT | Mod: 25 | Performed by: PHYSICIAN ASSISTANT

## 2021-05-12 PROCEDURE — 36415 COLL VENOUS BLD VENIPUNCTURE: CPT | Performed by: INTERNAL MEDICINE

## 2021-05-12 PROCEDURE — 80048 BASIC METABOLIC PNL TOTAL CA: CPT | Performed by: INTERNAL MEDICINE

## 2021-05-12 PROCEDURE — 20550 NJX 1 TENDON SHEATH/LIGAMENT: CPT | Performed by: PHYSICIAN ASSISTANT

## 2021-05-12 RX ORDER — ACAMPROSATE CALCIUM 333 MG/1
333 TABLET, DELAYED RELEASE ORAL 3 TIMES DAILY
Qty: 90 TABLET | Refills: 2 | Status: SHIPPED | OUTPATIENT
Start: 2021-05-12 | End: 2021-05-18

## 2021-05-12 RX ORDER — QUETIAPINE FUMARATE 50 MG/1
50 TABLET, FILM COATED ORAL
Qty: 30 TABLET | Refills: 1 | Status: SHIPPED | OUTPATIENT
Start: 2021-05-12 | End: 2021-07-13

## 2021-05-12 RX ORDER — METHOCARBAMOL 500 MG/1
500 TABLET, FILM COATED ORAL 3 TIMES DAILY
Qty: 90 TABLET | Refills: 1 | Status: SHIPPED | OUTPATIENT
Start: 2021-05-12 | End: 2021-07-12

## 2021-05-12 RX ORDER — QUETIAPINE 150 MG/1
150 TABLET, FILM COATED, EXTENDED RELEASE ORAL AT BEDTIME
Qty: 30 TABLET | Refills: 1 | Status: SHIPPED | OUTPATIENT
Start: 2021-05-12 | End: 2021-06-29

## 2021-05-12 RX ORDER — HYDROXYZINE HYDROCHLORIDE 25 MG/1
25 TABLET, FILM COATED ORAL EVERY 6 HOURS
Qty: 90 TABLET | Refills: 1 | Status: SHIPPED | OUTPATIENT
Start: 2021-05-12 | End: 2021-07-01

## 2021-05-12 RX ORDER — VALACYCLOVIR HYDROCHLORIDE 1 G/1
TABLET, FILM COATED ORAL
Qty: 4 TABLET | Refills: 3 | Status: SHIPPED | OUTPATIENT
Start: 2021-05-12 | End: 2021-06-29

## 2021-05-12 RX ORDER — ACAMPROSATE CALCIUM 333 MG/1
TABLET, DELAYED RELEASE ORAL
COMMUNITY
Start: 2021-04-16 | End: 2021-05-12

## 2021-05-12 RX ORDER — METHOCARBAMOL 500 MG/1
500 TABLET, FILM COATED ORAL
COMMUNITY
Start: 2021-04-17 | End: 2021-05-12

## 2021-05-12 RX ORDER — QUETIAPINE 150 MG/1
150 TABLET, FILM COATED, EXTENDED RELEASE ORAL AT BEDTIME
COMMUNITY
Start: 2021-04-26 | End: 2021-05-12

## 2021-05-12 ASSESSMENT — MIFFLIN-ST. JEOR: SCORE: 1268.33

## 2021-05-12 NOTE — PROGRESS NOTES
"    Assessment & Plan     Alcohol use disorder, severe, in early remission (H)  Patient reports sobriety since hospital admission.  Advised the significant  concern I have regarding her self withdrawal from her intensive outpatient program.  She states that she feels very \"strong\" at present, however, with the patient's dramatic and significant alcohol history I fear that she is walking a very fine line and could relapse easily.  I refilled her acamprosate today but I would like to have addiction medicine involved in her post hospitalization care.  Referral placed today.  Patient voiced understanding and agreement and will schedule this appointment.  At a minimum, I advised at least 3-4 AA sessions weekly if not working a formal intensive outpatient program.  Patient voiced understanding and agreement.  - MENTAL HEALTH REFERRAL  - Adult; Addiction Medicine Provider; Addiction Medicine Evaluation & Treatment; Addiction Medicine Consultation, Evaluation & Treatment [phone]; Alcohol; Medication Assisted Treatment: Alcohol; We will contact you t...  - acamprosate (CAMPRAL) 333 MG EC tablet  Dispense: 90 tablet; Refill: 2    Chronic insomnia  Patient reports significant improvement in her sleep with the addition of Seroquel extended release and immediate release.  Refilled today.  - hydrOXYzine (ATARAX) 25 MG tablet  Dispense: 90 tablet; Refill: 1  - QUEtiapine (SEROQUEL) 50 MG tablet  Dispense: 30 tablet; Refill: 1  - QUEtiapine (SEROQUEL XR) 150 MG TB24 24 hr tablet  Dispense: 30 tablet; Refill: 1    Mild major depression (H)  Anxiety  Stable.  Continue current regimen.  - hydrOXYzine (ATARAX) 25 MG tablet  Dispense: 90 tablet; Refill: 1    Muscle spasm  Stable.  Uses up to 3 times daily as needed.  Refilled today.  - methocarbamol (ROBAXIN) 500 MG tablet  Dispense: 90 tablet; Refill: 1    Cold sore  Patient reports increased frequency of cold sores that she attributes to stress.  Refilled today.  - valACYclovir " (VALTREX) 1000 mg tablet  Dispense: 4 tablet; Refill: 3    Trigger finger, right middle finger   Symptomatic.  Injected per procedure note below.  Patient tolerated well.  If recurrence patient advised to contact the clinic.  - Injection Single Tendon Sheeth/Ligament    Stage 3a chronic kidney disease  Stable.  Rechecked by cardiology today.       Review of prior external note(s) from - CareEverywhere information from [facility] reviewed  60 minutes spent on the date of the encounter doing chart review, history and exam, documentation and further activities per the note        Return in about 4 weeks (around 6/9/2021) for addiction medicine .    Nayeli Castorena PA-C  [facility] [place]    Sole Mckeon is a 75 year old who presents for the following health issues     HPI       Hospital Follow-up Visit:    Hospital/Nursing Home/ Rehab Facility: [facility] INPATINT TREATMENT  Date of Admission: 3/23/21  Date of Discharge: 4/21/21  Reason(s) for Admission: Alcohol treatment      Was your hospitalization related to COVID-19? No   Problems taking medications regularly:  None  Medication changes since discharge: yes  Problems adhering to non-medication therapy:  None    3/23/2021 - sober since that time/admission to the hospital.      [facility] inpatient - 28 days.  States that she is very proud of herself for completing the program as she has been unsuccessful in doing so in the past.  She was discharged to intensive outpatient which was a twice weekly program.  She only attended this program for 1.5 weeks and discharged herself with the preference to attend AA meetings instead.  She discontinued this program on 4/27/2021.  She states that she has attended 2 AA meetings since 4/27/2021.  She vocalizes a good support system with her partner that lives with her who does not drink alcohol.  Her daughter is also good support as well as her friends.  When asked about her sobriety she states that she has  "been sober but she gets \"antsy \"in the evenings.  She does not have alcohol in the home.  She states that she \"almost killed her self \"and this scared her enough that she is confident in her ability to stay sober.    She reports that she is taking Campral 333 mg 3 times daily, discontinued bupropion during hospitalization,  is taking Seroquel 150 mg extended release at bedtime and 50 mg immediate release when she wakes up in the middle of the night.  She states that this is helping her significantly.  She also is taking mirtazapine 15 mg, trazodone 100 mg.    All medications were reconciled against her discharge summary from her inpatient treatment program.  She does not currently have a psychiatric provider managing her medications.    Summary of hospitalization:  [facility] information obtained and reviewed  Diagnostic Tests/Treatments reviewed.  Follow up needed: Addiction medicine  Other Healthcare Providers Involved in Patient s Care:         Patient was discharged to intensive outpatient program - stopped as she felt it was \"redundant\"  Update since discharge: improved. Post Discharge Medication Reconciliation: discharge medications reconciled, continue medications without change.  Plan of care communicated with patient          Trigger finger  Right 3rd finger.  A few weeks.  Gets stuck in flexion every day and at times at night.  No trauma.  No history of simililar.  Has to use hand to \"unstuck\" the finger.  Wondering about an injection today.    Review of Systems   Constitutional, HEENT, cardiovascular, pulmonary, GI, , musculoskeletal, neuro, skin, endocrine and psych systems are negative, except as otherwise noted.      Objective    /70   Pulse 71   Temp 97  F (36.1  C)   Resp 16   Ht 1.626 m (5' 4\")   Wt 79.8 kg (176 lb)   SpO2 98%   BMI 30.21 kg/m    Body mass index is 30.21 kg/m .  Physical Exam   GENERAL: healthy, alert and no distress  EYES: Eyes grossly normal to " inspection  BREAST: normal without masses, tenderness or nipple discharge and no palpable axillary masses or adenopathy  ABDOMEN: soft, nontender, no hepatosplenomegaly, no masses and bowel sounds normal  MS: no gross musculoskeletal defects noted, no edema, right third finger flexor tendon sheath nodule that is tender palpable at the proximal right third digit on the volar aspect of the palm.  Palpable triggering with flexion of the third finger requiring manual extension.  SKIN: no suspicious lesions or rashes  NEURO: Normal strength and tone, mentation intact and speech normal  PSYCH: mentation appears normal, affect normal/bright    PROCEDURE:  After discussing the risks, benefits and alternatives to a right Trigger fingercortisone injection the patient elected to proceed with the injection.  The volar aspect of the proximal right third finger was prepped with a betadine solution.  Using a sterile technique and a 27 gauge needle, 1 cc's of 2% lidocaine and 10 mg in Kenalog were injected into the right Third finger flexor tendon sheath nodule at the point of maximal tenderness.  The patient tolerated the procedure well and there were no immediate adverse effects.        Orders Only on 05/12/2021   Component Date Value Ref Range Status     Sodium 05/12/2021 140  133 - 144 mmol/L Final     Potassium 05/12/2021 4.0  3.4 - 5.3 mmol/L Final     Chloride 05/12/2021 107  94 - 109 mmol/L Final     Carbon Dioxide 05/12/2021 30  20 - 32 mmol/L Final     Anion Gap 05/12/2021 3  3 - 14 mmol/L Final     Glucose 05/12/2021 104* 70 - 99 mg/dL Final     Urea Nitrogen 05/12/2021 15  7 - 30 mg/dL Final     Creatinine 05/12/2021 1.00  0.52 - 1.04 mg/dL Final     GFR Estimate 05/12/2021 54* >60 mL/min/[1.73_m2] Final    Comment: Non  GFR Calc  Starting 12/18/2018, serum creatinine based estimated GFR (eGFR) will be   calculated using the Chronic Kidney Disease Epidemiology Collaboration   (CKD-EPI) equation.        GFR Estimate If Black 05/12/2021 63  >60 mL/min/[1.73_m2] Final    Comment:  GFR Calc  Starting 12/18/2018, serum creatinine based estimated GFR (eGFR) will be   calculated using the Chronic Kidney Disease Epidemiology Collaboration   (CKD-EPI) equation.       Calcium 05/12/2021 9.4  8.5 - 10.1 mg/dL Final     Liver Function Studies -   Recent Labs   Lab Test 03/30/21 03/27/21  0649   PROTTOTAL  --  6.9   ALBUMIN  --  3.1*   BILITOTAL  --  0.3   ALKPHOS  --  54   AST 20 12   ALT 16 12     CBC (04/18/2021 7:56 AM CDT)  CBC (04/18/2021 7:56 AM CDT)   Component Value Ref Range Performed At Pathologist Signature   WHITE BLOOD COUNT  5.9 4.5 - 11.0 thou/cu mm [facility]     RED BLOOD COUNT  3.90 (L) 4.00 - 5.20 mil/cu mm [facility]     HEMOGLOBIN  11.0 (L) 12.0 - 16.0 g/dL [facility]     HEMATOCRIT  35.4 33.0 - 51.0 % [facility]     MCV  91 80 - 100 fL [facility]     MCH  28.2 26.0 - 34.0 pg [facility]     MCHC  31.1 (L) 32.0 - 36.0 g/dL [facility]     RDW  15.4 11.5 - 15.5 % [facility]     PLATELET COUNT  334 140 - 440 thou/cu mm [facility]     MPV  10.0 6.5 - 11.0 fL [facility]       HEPATIC FUNCTION PANEL (03/30/2021 8:31 AM CDT)  HEPATIC FUNCTION PANEL (03/30/2021 8:31 AM CDT)   Component Value Ref Range Performed At Pathologist Signature   ALBUMIN 3.9 3.2 - 4.6 g/dL [facility]     PROTEIN,TOTAL 7.3 6.0 - 8.0 g/dL [facility]     GLOBULIN  3.4 2.0 - 3.7 g/dL [facility]     A/G RATIO 1.1 1.0 - 2.0  [facility]     BILIRUBIN,TOTAL 0.2 0.2 - 1.2 mg/dL [facility]      BILIRUBIN,DIRECT 0.2 0.1 - 0.5 mg/dL [facility]     BILIRUBIN,INDIRECT  <0.1 (L) 0.2 - 0.8 mg/dL [facility]     ALK PHOSPHATASE 54 50 - 136 IU/L [facility]     ALT (SGPT) 16 8 - 45 IU/L [facility]     AST (SGOT) 20 2 - 40 IU/L [facility]

## 2021-05-13 ENCOUNTER — TELEPHONE (OUTPATIENT)
Dept: ADDICTION MEDICINE | Facility: CLINIC | Age: 76
End: 2021-05-13

## 2021-05-13 NOTE — TELEPHONE ENCOUNTER
Patient has been referred to our services in the past. Please reach out to schedule initial appt; in person preferred.    Jin Ackerman MD

## 2021-05-13 NOTE — TELEPHONE ENCOUNTER
Routing refill request to provider for review/approval because:  Drug not on the [facility] refill protocol         Sara Williamson RN  [facility]

## 2021-05-14 NOTE — TELEPHONE ENCOUNTER
PA Initiation    Medication: methocarbamol (ROBAXIN) 500 MG tablet   Insurance Company: [employer] - Phone [phone] Fax [phone]  Pharmacy Filling the Rx: [id number] - [place] - [address] AT [place] [address]  Filling Pharmacy Phone: [phone]  Filling Pharmacy Fax: [phone]  Start Date: 5/14/2021

## 2021-05-14 NOTE — TELEPHONE ENCOUNTER
Prior Authorization Approval    Authorization Effective Date: 4/14/2021  Authorization Expiration Date: 5/14/2022  Medication: methocarbamol (ROBAXIN) 500 MG tablet--APPROVED  Approved Dose/Quantity:   Reference #:     Insurance Company: [employer] - Phone [phone] Fax [phone]  Expected CoPay:       CoPay Card Available:      Foundation Assistance Needed:    Which Pharmacy is filling the prescription (Not needed for infusion/clinic administered): [id number] - [place], [place] - [address] AT [place] [address]  Pharmacy Notified: Yes  Patient Notified: Yes **Instructed pharmacy to notify patient when script is ready to /ship.**

## 2021-05-15 DIAGNOSIS — F10.21 ALCOHOL USE DISORDER, SEVERE, IN EARLY REMISSION (H): ICD-10-CM

## 2021-05-18 RX ORDER — ACAMPROSATE CALCIUM 333 MG/1
333 TABLET, DELAYED RELEASE ORAL 3 TIMES DAILY
Qty: 270 TABLET | Refills: 1 | Status: SHIPPED | OUTPATIENT
Start: 2021-05-18 | End: 2021-08-04

## 2021-05-18 NOTE — TELEPHONE ENCOUNTER
Routing refill request to provider for review/approval because:  Drug not on the [facility] refill protocol   Pharmacy requesting 90 day refill        Sara Williamson RN  [facility]

## 2021-05-25 ENCOUNTER — TELEPHONE (OUTPATIENT)
Dept: FAMILY MEDICINE | Facility: CLINIC | Age: 76
End: 2021-05-25

## 2021-05-25 NOTE — TELEPHONE ENCOUNTER
Reason for Call:  Form, our goal is to have forms completed with 72 hours, however, some forms may require a visit or additional information.    Type of letter, form or note:  medical    Who is the form from?: [employer] (if other please explain)    Where did the form come from: form was faxed in    What clinic location was the form placed at?: [place]    Where the form was placed: Nayeli Castorena PA-C Box/Folder    What number is listed as a contact on the form?: Fax to [phone]       Additional comments: Citalopram 20 mg    Call taken on 5/25/2021 at 10:27 AM by Sofía Villatoro

## 2021-05-26 NOTE — TELEPHONE ENCOUNTER
No refills on file for the citalopram.  Please send refill to [facility]       Essence Elizondo

## 2021-05-26 NOTE — TELEPHONE ENCOUNTER
RX sent 2 weeks ago and PA approved.  Please advise pharmacy      Nayeli Castorena MBA, MS, PA-C

## 2021-05-27 NOTE — TELEPHONE ENCOUNTER
Refill sent   citalopram (CELEXA) 20 MG tablet 90 tablet 1 5/26/2021  No   Sig - Route: Take 1 tablet (20 mg) by mouth daily - Oral   Sent to pharmacy as: Citalopram Hydrobromide 20 MG Oral Tablet (celeXA)   Class: E-Prescribe   Order: [id number]   E-Prescribing Status: Receipt confirmed by pharmacy (5/26/2021 12:51 PM CDT)       Pankaj MYERS RN   [facility]

## 2021-06-04 RX ORDER — TIZANIDINE 2 MG/1
TABLET ORAL
Refills: 0 | OUTPATIENT
Start: 2021-06-04

## 2021-06-04 NOTE — TELEPHONE ENCOUNTER
PA for methocarbamol (Robaxin), Approved on 5/14. Called [facility] pharmacy. They need to call her insurance and then will be able to fill it.     RX for tizanidine refused.    Lynn Vera RN  [facility]

## 2021-06-24 DIAGNOSIS — I10 BENIGN ESSENTIAL HYPERTENSION: ICD-10-CM

## 2021-06-24 NOTE — TELEPHONE ENCOUNTER
Linda needs refill of losartan (COZAAR) 25 MG tablet and hydrOXYzine (ATARAX) 25 MG tablet, sent to      [facility] - [facility], [place] - [address] [place] [address]    Linda [phone], ok to leave detailed message.    Josy Anton-

## 2021-06-25 ENCOUNTER — OFFICE VISIT (OUTPATIENT)
Dept: CARDIOLOGY | Facility: CLINIC | Age: 76
End: 2021-06-25
Attending: INTERNAL MEDICINE
Payer: COMMERCIAL

## 2021-06-25 VITALS
WEIGHT: 174 LBS | HEIGHT: 64 IN | DIASTOLIC BLOOD PRESSURE: 75 MMHG | SYSTOLIC BLOOD PRESSURE: 114 MMHG | HEART RATE: 79 BPM | BODY MASS INDEX: 29.71 KG/M2

## 2021-06-25 DIAGNOSIS — I10 BENIGN ESSENTIAL HYPERTENSION: ICD-10-CM

## 2021-06-25 DIAGNOSIS — I35.0 NONRHEUMATIC AORTIC VALVE STENOSIS: Primary | ICD-10-CM

## 2021-06-25 DIAGNOSIS — F10.20 ALCOHOL USE DISORDER, SEVERE, DEPENDENCE (H): ICD-10-CM

## 2021-06-25 DIAGNOSIS — E78.00 HYPERCHOLESTEROLEMIA: ICD-10-CM

## 2021-06-25 PROCEDURE — 99214 OFFICE O/P EST MOD 30 MIN: CPT | Performed by: INTERNAL MEDICINE

## 2021-06-25 RX ORDER — PRAVASTATIN SODIUM 20 MG
20 TABLET ORAL DAILY
Qty: 30 TABLET | Refills: 4 | Status: SHIPPED | OUTPATIENT
Start: 2021-06-25 | End: 2021-11-24

## 2021-06-25 ASSESSMENT — MIFFLIN-ST. JEOR: SCORE: 1259.26

## 2021-06-25 NOTE — PROGRESS NOTES
Clinic visit note dictated. Dictation reference number - [id number]            REVIEW OF SYSTEMS:  A comprehensive 10-point review of systems was completed and the pertinent positives are documented in the history of present illness.    Skin:  Negative     Eyes:  Negative    ENT:  Negative    Respiratory:  Negative    Cardiovascular:  Negative    Gastroenterology: Negative    Genitourinary:  Negative    Musculoskeletal:  Negative    Neurologic:  Negative    Psychiatric:  Negative    Heme/Lymph/Imm:  Negative    Endocrine:  Negative      CURRENT MEDICATIONS:  Current Outpatient Medications   Medication Sig Dispense Refill     acamprosate (CAMPRAL) 333 MG EC tablet Take 1 tablet (333 mg) by mouth 3 times daily 270 tablet 1     aspirin 81 MG EC tablet Take 81 mg by mouth daily       atenolol (TENORMIN) 25 MG tablet TAKE 1 TABLET EVERY MORNING 90 tablet 0     folic acid (FOLVITE) 1 MG tablet Take 1 mg by mouth daily       furosemide (LASIX) 20 MG tablet Take 1 tablet (20 mg) by mouth daily TAKE 1 TABLET DAILY 100 tablet 4     gabapentin (NEURONTIN) 300 MG capsule Take 1 capsule (300 mg) by mouth 3 times daily 270 capsule 1     hydrOXYzine (ATARAX) 25 MG tablet Take 1 tablet (25 mg) by mouth every 6 hours 90 tablet 1     losartan (COZAAR) 25 MG tablet Take 1 tablet (25 mg) by mouth every morning 30 tablet 3     methocarbamol (ROBAXIN) 500 MG tablet Take 1 tablet (500 mg) by mouth 3 times daily 90 tablet 1     Multiple Vitamins-Minerals (CENTRUM SILVER) per tablet Take 1 tablet by mouth daily       polyethylene glycol (MIRALAX) 17 g packet Take 17 g by mouth 2 times daily       pravastatin (PRAVACHOL) 20 MG tablet Take 1 tablet (20 mg) by mouth daily 30 tablet 4     QUEtiapine (SEROQUEL XR) 150 MG TB24 24 hr tablet Take 1 tablet (150 mg) by mouth At Bedtime 30 tablet 1     QUEtiapine (SEROQUEL) 50 MG tablet Take 1 tablet (50 mg) by mouth nightly as needed (Anxiety, insomnia, hallucinations) 30 tablet 1     senna-docusate  (SENOKOT-S/PERICOLACE) 8.6-50 MG tablet Take 1 tablet by mouth 2 times daily       thiamine (B-1) 100 MG tablet Take 1 tablet (100 mg) by mouth daily       traZODone (DESYREL) 100 MG tablet Take 1 tablet (100 mg) by mouth At Bedtime 90 tablet 1     citalopram (CELEXA) 20 MG tablet Take 1 tablet (20 mg) by mouth daily (Patient not taking: Reported on 6/25/2021) 90 tablet 1     mirtazapine (REMERON) 15 MG tablet Take 1 tablet (15 mg) by mouth At Bedtime (Patient not taking: Reported on 6/25/2021) 90 tablet 1     valACYclovir (VALTREX) 1000 mg tablet TAKE 2 TABS BY MOUTH EVERY 12 HOURS FOR 1 DAY ONLY FOR OUTBREAKS OF COLD SORES AS NEEDED (Patient not taking: Reported on 6/25/2021) 4 tablet 3         ALLERGIES:  Allergies   Allergen Reactions     Bactrim [Sulfamethoxazole W/Trimethoprim] Hives     Codeine Itching     NAUSEA     Morphine Itching     NAUSEA       PAST MEDICAL HISTORY:    Past Medical History:   Diagnosis Date     Alcohol abuse     Long term alcohol abuse. Abstinenet since October 2019.     Anxiety disorder      Ascending aorta dilatation (H)      Bariatric surgery status 1996?    gastric bypass, [facility] and     Benign hypertension      Chronic insomnia      Chronic pain syndrome     Chronic back and neck pain, chronic pain due to osteoarthritis multiple joints     Coronary artery disease involving native coronary artery of native heart without angina pectoris 10/16/2018    Minimal coronary artery disease on coronary angiogram in 2015.      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     Major depressive disorder, single episode, severe, without mention of psychotic behavior      Mixed hyperlipidemia      Moderate aortic stenosis 05/2014    moderate to  severe aortic valve stenosis     Pelvic relaxation disorder     Surgical intervention for cystocele/rectocele 3,11/2012     Personal history of urinary calculi 6/2006    left ureteral stone,lithotripsy     Psoriasis      PVC (premature ventricular contraction)      Spinal stenosis      Stage III chronic kidney disease 2005     SVT (supraventricular tachycardia) (H)     likely atrial tachycardia       PAST SURGICAL HISTORY:    Past Surgical History:   Procedure Laterality Date     APPENDECTOMY  3/2004    incidental     ARTHRODESIS TOE(S) Right 1/31/2020    Procedure: RIGHT FIRST METATARSAL PHALANGEAL JOINT ARTHRODESIS;  Surgeon: Steven Reyes MD;  Location:  OR     C GASTRIC BYPASS,OBESE<100CM ARIANNA-EN-Y  1996     C MEDIASTINOSCOPY W OR WO BIOPSY  2/2008    Videomediastinoscopy and, for mediastinal adenopathy -reactive lymphoid hyperplasia     C REPAIR OF RECTOCELE  3/2012     C TOTAL KNEE ARTHROPLASTY  12/2005    left      CARPAL TUNNEL RELEASE RT/LT  10/2010    Carpometacarpal excisional arthroplasty with a fascial autograft and APL suspension sling (81725). 2. Left thumb metacarpophalangeal joint fusion with autologous bone graft (42690). 3. Left endoscopic carpal tunnel release      CHOLECYSTECTOMY, LAPOROSCOPIC  11/2010    Cholecystectomy, Laparoscopic     COLONOSCOPY N/A 9/8/2016    Procedure: COMBINED COLONOSCOPY, SINGLE OR MULTIPLE BIOPSY/POLYPECTOMY BY BIOPSY;  Surgeon: Moe Barlow MD;  Location:  GI     CYSTOCELE REPAIR  11/2012    davinc laparoscopic sacrocolpopexy, enterocele repair, lysis of adhesions, placement of retropubic mid urethral sling, cystoscopy     CYSTOSCOPY, LITHOTRIPSY, COMBINED  6/2006    Left extracorporeal shock wave lithotripsy, cystoscopy, left ureteral stent placement.     CYSTOSCOPY, REMOVE STENT(S), COMBINED  7/2006    Cystoscopy, removal of left ureteral stent, retrograde pyelography, flexible and rigid ureteroscopy and holmium laser lithotripsy, basket removal of  stone fragments, ureteral stent placement.      ENDOSCOPIC ULTRASOUND UPPER GASTROINTESTINAL TRACT (GI) N/A 6/12/2017    Procedure: ENDOSCOPIC ULTRASOUND, ESOPHAGOSCOPY / UPPER GASTROINTESTINAL TRACT (GI);  ENDOSCOPIC ULTRASOUND, ESOPHAGOSCOPY / UPPER GASTROINTESTINAL TRACT (GI);  Surgeon: Parth Graham MD;  Location:  GI     HERNIA REPAIR  4/2012    bilateral augmentation mastopexy, ventral hernia repair, and medial thigh liposuction on 04/06/2012.      HYSTERECTOMY VAGINAL, BILATERAL SALPINGO-OOPHERECTOMY, COMBINED  1998    due to myoma and bleeding     JOINT REPLACEMENT, HIP RT/LT  4/2004    right total hip arthroplasty     LAPAROTOMY, LYSIS ADHESIONS, COMBINED  3/2004    lysis adhesions, ventral hernia repair, appendectomy incidentally     LYMPH NODE BIOPSY  4/2008    right axillary, reactive follicular and paracortical hyperplasia.     MAMMOPLASTY AUGMENTATION BILATERAL  4/2012     REPAIR HAMMER TOE Right 1/31/2020    Procedure: WITH SECOND AND THIRD CLAW TOE RECONSTRUCTION;  Surgeon: Steven Reyes MD;  Location:  OR     REVISE RECONSTRUCTED BREAST  6/7/2012    Left breast capsulotomy.        FAMILY HISTORY:    Family History   Problem Relation Age of Onset     Substance Abuse Father      Cancer Father         throat and lung mets     Diabetes No family hx of      Coronary Artery Disease No family hx of      Cerebrovascular Disease No family hx of        SOCIAL HISTORY:    Social History     Socioeconomic History     Marital status:      Spouse name: Mt     Number of children: 4     Years of education: 18     Highest education level: None   Occupational History     Occupation: nurse     Employer: [employer]     Employer: RETIRED   Social Needs     Financial resource strain: None     Food insecurity     Worry: None     Inability: None     Transportation needs     Medical: None     Non-medical: None   Tobacco Use     Smoking status: Never Smoker     Smokeless tobacco: Never Used  "  Substance and Sexual Activity     Alcohol use: Not Currently     Alcohol/week: 63.0 standard drinks     Types: 63 Standard drinks or equivalent per week     Comment: March 23rd Sobriety date     Drug use: No     Sexual activity: Yes     Partners: Male   Lifestyle     Physical activity     Days per week: None     Minutes per session: None     Stress: None   Relationships     Social connections     Talks on phone: None     Gets together: None     Attends Druze service: None     Active member of club or organization: None     Attends meetings of clubs or organizations: None     Relationship status: None     Intimate partner violence     Fear of current or ex partner: None     Emotionally abused: None     Physically abused: None     Forced sexual activity: None   Other Topics Concern      Service Not Asked     Blood Transfusions No     Caffeine Concern Yes     Comment: 1-2 cups per day      Occupational Exposure Yes     Comment: blood     Hobby Hazards No     Sleep Concern Yes     Stress Concern Yes     Weight Concern Yes     Comment: gastric  byepass     Special Diet No     Back Care No     Exercise Yes     Comment: walk, swin     Bike Helmet No     Seat Belt Yes     Self-Exams Yes     Parent/sibling w/ CABG, MI or angioplasty before 65F 55M? Not Asked   Social History Narrative     None       PHYSICAL EXAM:    Vitals: /75   Pulse 79   Ht 1.626 m (5' 4\")   Wt 78.9 kg (174 lb)   BMI 29.87 kg/m    Wt Readings from Last 5 Encounters:   06/25/21 78.9 kg (174 lb)   05/12/21 79.8 kg (176 lb)   03/24/21 77.2 kg (170 lb 3.2 oz)   03/16/21 79.4 kg (175 lb)   03/03/21 76.7 kg (169 lb)         Encounter Diagnoses   Name Primary?     Nonrheumatic aortic valve stenosis Yes     Hypercholesterolemia      Benign essential hypertension      Alcohol use disorder, severe, dependence (H)        Orders Placed This Encounter   Procedures     Comprehensive metabolic panel     Lipid Profile     Echocardiogram Complete "

## 2021-06-25 NOTE — PROGRESS NOTES
Service Date: 06/25/2021    PRIMARY CARE PROVIDER:  Nayeli Castorena PA-C    REASONS FOR VISIT:    1.  Followup of hypertension after medication changes.  2.  Followup of moderately severe aortic valve stenosis.  3.  Hyperlipidemia management.    HISTORY OF PRESENT ILLNESS:    It was my pleasure to follow up with Linda who is known to me from previous visits.  She is a delightful 75-year-old  lady.    Her history is significant for:  1.  Alcohol dependency with multiple rehabilitation interventions, most recently in 03/2021.  She reports sobriety for the last 3 months.  2.  Moderately severe aortic valve stenosis.  Under surveillance.  Visually, the valve is moderately stenotic.  Hemodynamics suggest moderate-to-severe range.  No symptoms.  3.  Stable mild ascending aorta dilatation of 4.1 cm with normal aortic root.  Confirmed on CTA.  4.  Hypertension.  5.  BMI 30 kg/m2 following previous gastric bypass surgery.  6.  Never-tobacco user.    I had seen Linda recently on 03/16/2021 when her blood pressure was elevated at 160/82 mmHg with a pulse of 85 BPM.  She was on atenolol 25 mg daily and furosemide 20 mg daily.  I had started her on losartan 25 mg daily.  She has tolerated this well.  Her blood pressure is optimal at 114/75, pulse 79 per minute.  Creatinine 1.0.  Potassium 4.0.    With regards to her aortic valve stenosis, her murmur on auscultation remains moderate with a late-peaking systolic murmur and a well-audible second heart sound.  Carotid upstroke is normal.  She still walks for 30 minutes every day and denies any chest pain, exercise intolerance, dyspnea.    Her most recent lipid panel shows that her cholesterol is 300 and HDL is very high at 140.  Her LDL is 145 and triglycerides are 77.  Liver enzymes are normal.  She is not on lipid-lowering therapy.    ASSESSMENT:   1. Aortic valve stenosis.    Remains moderate on physical exam and patient is asymptomatic.  Blood pressure is optimally controlled  with addition of losartan.  The patient has been sober from alcohol intake for 3 months.  Liver enzymes are normal.  Time to start statin.    PLAN:    1.  Continue combination of losartan 25 mg, furosemide 20 mg and atenolol 25 mg for hypertension.  2.  Start pravastatin 20 mg daily.  3.  Follow up fasting lipid panel and liver enzymes in 4 months.  4.  Followup echocardiogram and visit with me in clinic in 4 months for her aortic stenosis surveillance.  5.  Linda is going to visit his son's family and live in [place] for a month.  She is really looking forward to it.  I wish her all the best.    Herman Ugarte MD        D: 2021   T: 2021   MT: jurgen    Name:     DARWIN JASSO  MRN:      [id number]        Account:      [id number]   :      1945           Service Date: 2021       Document: [id number]

## 2021-06-25 NOTE — PATIENT INSTRUCTIONS
MEDICATIONS:  1.  Start a cholesterol-lowering medication called pravastatin 20 mg.  Take 1 tablet daily.  New prescription sent.    FOLLOW UP:  1.  Heart ultrasound and follow-up with Dr. Ugarte in 4 months.    If you have any questions or concerns, please contact my nurses at [phone].

## 2021-06-28 RX ORDER — LOSARTAN POTASSIUM 25 MG/1
25 TABLET ORAL EVERY MORNING
Qty: 30 TABLET | Refills: 1 | Status: SHIPPED | OUTPATIENT
Start: 2021-06-28 | End: 2021-08-20

## 2021-06-29 ENCOUNTER — VIRTUAL VISIT (OUTPATIENT)
Dept: ADDICTION MEDICINE | Facility: CLINIC | Age: 76
End: 2021-06-29
Attending: PHYSICIAN ASSISTANT
Payer: COMMERCIAL

## 2021-06-29 DIAGNOSIS — F51.04 CHRONIC INSOMNIA: ICD-10-CM

## 2021-06-29 DIAGNOSIS — F10.20 ALCOHOL USE DISORDER, SEVERE, DEPENDENCE (H): Primary | ICD-10-CM

## 2021-06-29 PROCEDURE — 99203 OFFICE O/P NEW LOW 30 MIN: CPT | Mod: 95 | Performed by: FAMILY MEDICINE

## 2021-06-29 RX ORDER — QUETIAPINE FUMARATE 100 MG/1
100 TABLET, FILM COATED ORAL 2 TIMES DAILY PRN
Qty: 60 TABLET | Refills: 1 | Status: SHIPPED | OUTPATIENT
Start: 2021-06-29 | End: 2021-08-20

## 2021-06-29 NOTE — PROGRESS NOTES
"  SUBJECTIVE                                                      Kavitha Headley is a 75 year old female who presents for  initial visit for addiction consultation and management referred by PCP due to concerns for Alcohol Use Disorder (AUD)    Visit performed Virtual, via video    Video-Visit Details    Type of service:  Video Visit    Video Start Time: 2:25 PM  Video End Time: 3:05 pm    Originating Location (pt. Location): Home    Distant Location (provider location):  [facility]     [facility]      HPI:   Kavitha Headley is a 75 year old female with history of alcohol use who presents for further evaluation of possible substance use disorder and management options.    - Attended treatment in March, abstinent since then  - Has been taking campral since then to help manage cravings; denies any cravings in recent weeks  - Spending time taking care of flowers, which brings her sadaf  -   10 years ago, which was \"a big trigger\", especially as she was taking care of him while he was sick for some time. Drinking significantly worsened after he . She notes she was drinking alcohol as a way to cope with her feelings  - She attained 3 months sobriety before attending programming at [facility] and left early. She returned to use sometime later after a prolonged period of abstinence (can't remember how long). Gradually returned to escalating use, hiding her use, and eventually had a near-death experience. She was admitted after passing out at home and was unresponsive to paramedics  - \"Drinking was a lot of fun in my 40s and 50s\"        Substance Use History:   LONGEST PERIOD OF SOBRIETY - Abstinent since    - Significant protective factors - family and friends, gardening    PREVIOUS DETOX/TREATMENT PROGRAMS - Tried a few programs in the past without completion; started 20 years ago. Completed programming at [facility] earlier this year    HISTORY OF OVERDOSE - " "hospital stay earlier this year d/t alcohol use    PREVIOUS MEDICATION ASSISTED TREATMENT - naltrexone; unsure if helpful because she was drinking while taking this. Currently taking acamprosate with good response    CURRENT RECOVERY ACTIVITIES - attends AA often    Other Substances:    ALCOHOL- as per HPI; minimal consequence until the past 10 years. Highest amount of alcohol use in her life was roughly 500-600ml daily, which was most often consumed in the period of time after her    MARIJUANA- none  PRESCRIPTION STIMULANTS- none  COCAINE/CRACK- none  METH/AMPHETAMINES- none  OPIATES- only with surgery; no use outside of prescribed indication  BENZODIAZEPINES- none  KRATOM- none  HALLUCINOGENS - none  OTHER - none; spends more money on shopping than she has sometimes, but not often    NICOTINE- never        Additional Pertinent History (Updated in Epic as appropriate)  PAST PSYCHIATRIC HISTORY - \"I'm quite ADD\"; depression more-so in the winter. Recently stopped wellbutrin and celexa as well as remeron. \"I think I'll need an antidepressant this winter\"; has not been diagnosed with seasonal affective disorder. 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 n/a     Medical History:    Patient Active Problem List    Diagnosis Date Noted     Alcohol withdrawal syndrome without complication (H) 2021     Priority: Medium     Cold sore 2021     Priority: Medium     Gastroesophageal reflux disease with esophagitis - chronic due to " alcohol 03/04/2021     Priority: Medium     Psoriasis - on Humira - Dr. Gauri Clark with dermatology 12/04/2019     Priority: Medium     Mild major depression (H) 12/04/2019     Priority: Medium     Thiamine deficiency 12/04/2019     Priority: Medium     Herpes simplex virus infection 12/04/2019     Priority: Medium     Mild ascending aorta dilatation (H)      Priority: Medium     Coronary artery disease involving native coronary artery of native heart without angina pectoris 10/16/2018     Priority: Medium     Minimal coronary artery disease on coronary angiogram in 2015.        Liver disease, chronic, due to alcohol (H) 08/15/2018     Priority: Medium     Alcohol use disorder, severe, dependence (H) 02/12/2018     Priority: Medium     Vitamin B12 deficiency (non anemic) 02/06/2018     Priority: Medium     Anxiety 07/10/2014     Priority: Medium     Chronic pain syndrome      Priority: Medium     Patient is followed by Alison Pena MD for ongoing prescription of pain medication.  All refills should be approved by this provider, or covering partner.    Medication(s): tramadol 20 mg BID prn pain.   Maximum quantity per month: 60  Clinic visit frequency required: Q 6  months     Controlled substance agreement:  Encounter-Level CSA:     There are no encounter-level csa.        Pain Clinic evaluation in the past: No    DIRE Total Score(s):  No flowsheet data found.    Last [facility] website verification:  none   [email or web address]       Bariatric surgery status      Priority: Medium     gastric bypass, [facility] and       Anemia, unspecified type      Priority: Medium     Mild macrocytic anemia, 2012 to present, likely based on alcohol abuse.       CKD (chronic kidney disease) stage 3, GFR 30-59 ml/min 11/28/2012     Priority: Medium     Chronic insomnia      Priority: Medium     Spinal stenosis 12/21/2010     Priority: Medium     Personal history of urinary calculi 06/01/2006     Priority: Medium     left  ureteral stone,lithotripsy       Knee joint replacement status 12/01/2005     Priority: Medium     left         Past Medical History:   Diagnosis Date     Alcohol abuse     Long term alcohol abuse. Abstinenet since October 2019.     Anxiety disorder      Ascending aorta dilatation (H)      Bariatric surgery status 1996?    gastric bypass, [employer] [facility] [employer] and     Benign hypertension      Chronic insomnia      Chronic pain syndrome     Chronic back and neck pain, chronic pain due to osteoarthritis multiple joints     Coronary artery disease involving native coronary artery of native heart without angina pectoris 10/16/2018    Minimal coronary artery disease on coronary angiogram in 2015.      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     Major depressive disorder, single episode, severe, without mention of psychotic behavior      Mixed hyperlipidemia      Moderate aortic stenosis 05/2014    moderate to severe aortic valve stenosis     Pelvic relaxation disorder     Surgical intervention for cystocele/rectocele 3,11/2012     Personal history of urinary calculi 6/2006    left ureteral stone,lithotripsy     Psoriasis      PVC (premature ventricular contraction)      Spinal stenosis      Stage III chronic kidney disease 2005     SVT (supraventricular tachycardia) (H)     likely atrial tachycardia       Past Surgical History:   Procedure Laterality Date     APPENDECTOMY  3/2004    incidental     ARTHRODESIS TOE(S) Right 1/31/2020    Procedure: RIGHT FIRST METATARSAL PHALANGEAL JOINT ARTHRODESIS;  Surgeon: Steven Reyes MD;  Location: [facility] OR     C GASTRIC BYPASS,OBESE<100CM ARIANNA-EN-Y  1996     C MEDIASTINOSCOPY W OR WO BIOPSY  2/2008    Videomediastinoscopy and, for mediastinal  adenopathy -reactive lymphoid hyperplasia     C REPAIR OF RECTOCELE  3/2012     C TOTAL KNEE ARTHROPLASTY  12/2005    left      CARPAL TUNNEL RELEASE RT/LT  10/2010    Carpometacarpal excisional arthroplasty with a fascial autograft and APL suspension sling (71627). 2. Left thumb metacarpophalangeal joint fusion with autologous bone graft (22472). 3. Left endoscopic carpal tunnel release      CHOLECYSTECTOMY, LAPOROSCOPIC  11/2010    Cholecystectomy, Laparoscopic     COLONOSCOPY N/A 9/8/2016    Procedure: COMBINED COLONOSCOPY, SINGLE OR MULTIPLE BIOPSY/POLYPECTOMY BY BIOPSY;  Surgeon: Moe Barlow MD;  Location:  GI     CYSTOCELE REPAIR  11/2012    davinc laparoscopic sacrocolpopexy, enterocele repair, lysis of adhesions, placement of retropubic mid urethral sling, cystoscopy     CYSTOSCOPY, LITHOTRIPSY, COMBINED  6/2006    Left extracorporeal shock wave lithotripsy, cystoscopy, left ureteral stent placement.     CYSTOSCOPY, REMOVE STENT(S), COMBINED  7/2006    Cystoscopy, removal of left ureteral stent, retrograde pyelography, flexible and rigid ureteroscopy and holmium laser lithotripsy, basket removal of stone fragments, ureteral stent placement.      ENDOSCOPIC ULTRASOUND UPPER GASTROINTESTINAL TRACT (GI) N/A 6/12/2017    Procedure: ENDOSCOPIC ULTRASOUND, ESOPHAGOSCOPY / UPPER GASTROINTESTINAL TRACT (GI);  ENDOSCOPIC ULTRASOUND, ESOPHAGOSCOPY / UPPER GASTROINTESTINAL TRACT (GI);  Surgeon: Parth Graham MD;  Location:  GI     HERNIA REPAIR  4/2012    bilateral augmentation mastopexy, ventral hernia repair, and medial thigh liposuction on 04/06/2012.      HYSTERECTOMY VAGINAL, BILATERAL SALPINGO-OOPHERECTOMY, COMBINED  1998    due to myoma and bleeding     JOINT REPLACEMENT, HIP RT/LT  4/2004    right total hip arthroplasty     LAPAROTOMY, LYSIS ADHESIONS, COMBINED  3/2004    lysis adhesions, ventral hernia repair, appendectomy incidentally     LYMPH NODE BIOPSY  4/2008    right axillary, reactive  follicular and paracortical hyperplasia.     MAMMOPLASTY AUGMENTATION BILATERAL  4/2012     REPAIR HAMMER TOE Right 1/31/2020    Procedure: WITH SECOND AND THIRD CLAW TOE RECONSTRUCTION;  Surgeon: Steven Reyes MD;  Location: [facility] OR     REVISE RECONSTRUCTED BREAST  6/7/2012    Left breast capsulotomy.          Family History   Problem Relation Age of Onset     Substance Abuse Father      Cancer Father         throat and lung mets     Diabetes No family hx of      Coronary Artery Disease No family hx of      Cerebrovascular Disease No family hx of        Current Outpatient Medications   Medication Sig Dispense Refill     QUEtiapine (SEROQUEL) 100 MG tablet Take 1 tablet (100 mg) by mouth 2 times daily as needed (insomnia) 60 tablet 1     acamprosate (CAMPRAL) 333 MG EC tablet Take 1 tablet (333 mg) by mouth 3 times daily 270 tablet 1     aspirin 81 MG EC tablet Take 81 mg by mouth daily       atenolol (TENORMIN) 25 MG tablet TAKE 1 TABLET EVERY MORNING 90 tablet 3     folic acid (FOLVITE) 1 MG tablet Take 1 mg by mouth daily       furosemide (LASIX) 20 MG tablet Take 1 tablet (20 mg) by mouth daily TAKE 1 TABLET DAILY 100 tablet 4     gabapentin (NEURONTIN) 300 MG capsule Take 1 capsule (300 mg) by mouth 3 times daily 270 capsule 1     hydrOXYzine (ATARAX) 25 MG tablet Take 1 tablet (25 mg) by mouth every 6 hours 90 tablet 1     losartan (COZAAR) 25 MG tablet Take 1 tablet (25 mg) by mouth every morning 30 tablet 1     methocarbamol (ROBAXIN) 500 MG tablet Take 1 tablet (500 mg) by mouth 3 times daily 90 tablet 1     Multiple Vitamins-Minerals (CENTRUM SILVER) per tablet Take 1 tablet by mouth daily       polyethylene glycol (MIRALAX) 17 g packet Take 17 g by mouth 2 times daily       pravastatin (PRAVACHOL) 20 MG tablet Take 1 tablet (20 mg) by mouth daily 30 tablet 4     QUEtiapine (SEROQUEL XR) 150 MG TB24 24 hr tablet Take 1 tablet (150 mg) by mouth At Bedtime 30 tablet 1     QUEtiapine (SEROQUEL) 50 MG  tablet Take 1 tablet (50 mg) by mouth nightly as needed (Anxiety, insomnia, hallucinations) 30 tablet 1     senna-docusate (SENOKOT-S/PERICOLACE) 8.6-50 MG tablet Take 1 tablet by mouth 2 times daily       thiamine (B-1) 100 MG tablet Take 1 tablet (100 mg) by mouth daily       traZODone (DESYREL) 100 MG tablet Take 1 tablet (100 mg) by mouth At Bedtime 90 tablet 1         Allergies   Allergen Reactions     Bactrim [Sulfamethoxazole W/Trimethoprim] Hives     Codeine Itching     NAUSEA     Morphine Itching     NAUSEA           OBJECTIVE                                                      EXAM    There were no vitals taken for this visit.    GENERAL: healthy, alert and no distress  EYES: Eyes grossly normal to inspection, PERRL and conjunctivae and sclerae normal  RESP: No respiratory distress  MENTAL STATUS EXAM  Appearance/Behavior: No appearant distress  Speech: Normal  Mood/Affect: normal affect  Insight: Adequate      LAB  No results found for any visits on 06/29/21.      [facility] Board  Pharmacy Data Base Reviewed;    Consistent with patient reports and Epic records.           A/P                                                      ASSESSMENT/PLAN:  Kavitha was seen today for  treatment plan.    Diagnoses and all orders for this visit:    Alcohol use disorder, severe, dependence (H)    Chronic insomnia  -     QUEtiapine (SEROQUEL) 100 MG tablet; Take 1 tablet (100 mg) by mouth 2 times daily as needed (insomnia)      - Refill seroquel d/t vacation soon, reports she will be out while she is out of the [place]. Dose change with fairly equivalent response expected. Advised she can reach out to her PCP for possible early refill of her XR 150mg Rx  - Continue campral. No clear efficacy from naltrexone in the past but she reports drinking while she took this so she is unsure about effect. Would consider this if she struggles in the future  - Follow-up 3mo  - Encouraged recovery efforts; she attends meetings,  has robust social support, and fulfilling daily activities  - Would recommend SAD lamp trial this winter      ENCOUNTER FOR LONG TERM USE OF HIGH RISK MEDICATION   High Risk Drug Monitoring?  YES   Drug being monitored: acamprosate   Reason for drug: Alcohol Use Disorder   What is being monitored?: Dosage, Cravings, Triggers and side effects       Counseled the patient on the importance of having a recovery program in addition to medication to manage recovery.  Components include avoiding isolating, having willingness to change, avoiding triggers and managing cravings. Encouraged having some type of sober network and practicing honesty with trusted support person(s). Encouraged other services such as counseling, 12 step or other self-help organizations.          RTC   3 months      Jin Ackerman MD  [facility]  [phone]

## 2021-06-29 NOTE — PROGRESS NOTES
Linda is a 75 year old who is being evaluated via a billable video visit.      How would you like to obtain your AVS? MyChart  If the video visit is dropped, the invitation should be resent by: Text to cell phone: [phone]  Will anyone else be joining your video visit? No

## 2021-06-30 DIAGNOSIS — F32.0 MILD MAJOR DEPRESSION (H): ICD-10-CM

## 2021-06-30 DIAGNOSIS — F51.04 CHRONIC INSOMNIA: ICD-10-CM

## 2021-06-30 DIAGNOSIS — F41.9 ANXIETY: ICD-10-CM

## 2021-07-01 RX ORDER — HYDROXYZINE HYDROCHLORIDE 25 MG/1
TABLET, FILM COATED ORAL
Qty: 90 TABLET | Refills: 11 | Status: SHIPPED | OUTPATIENT
Start: 2021-07-01 | End: 2021-07-21

## 2021-07-10 DIAGNOSIS — F51.04 CHRONIC INSOMNIA: ICD-10-CM

## 2021-07-12 DIAGNOSIS — M62.838 MUSCLE SPASM: ICD-10-CM

## 2021-07-12 RX ORDER — METHOCARBAMOL 500 MG/1
TABLET, FILM COATED ORAL
Qty: 90 TABLET | Refills: 1 | Status: SHIPPED | OUTPATIENT
Start: 2021-07-12 | End: 2021-08-18

## 2021-07-12 NOTE — TELEPHONE ENCOUNTER
Routing refill request to provider for review/approval because:  Drug not on the [facility] refill protocol   Angi العراقي RN, BSN

## 2021-07-13 RX ORDER — QUETIAPINE FUMARATE 50 MG/1
50 TABLET, FILM COATED ORAL
Qty: 30 TABLET | Refills: 8 | Status: SHIPPED | OUTPATIENT
Start: 2021-07-13 | End: 2021-08-20

## 2021-07-17 DIAGNOSIS — F41.9 ANXIETY: ICD-10-CM

## 2021-07-17 DIAGNOSIS — F32.0 MILD MAJOR DEPRESSION (H): ICD-10-CM

## 2021-07-17 DIAGNOSIS — F51.04 CHRONIC INSOMNIA: ICD-10-CM

## 2021-07-21 RX ORDER — HYDROXYZINE HYDROCHLORIDE 25 MG/1
25 TABLET, FILM COATED ORAL EVERY 6 HOURS PRN
Qty: 90 TABLET | Refills: 0 | Status: SHIPPED | OUTPATIENT
Start: 2021-07-21 | End: 2021-09-07

## 2021-07-21 RX ORDER — HYDROXYZINE HYDROCHLORIDE 25 MG/1
TABLET, FILM COATED ORAL
Qty: 90 TABLET | Refills: 1 | OUTPATIENT
Start: 2021-07-21

## 2021-07-21 NOTE — TELEPHONE ENCOUNTER
Please verify with patient what pharmacy she uses.  This was [facility] was [facility] by them and now [facility] is requesting    Angi العراقي RN, BSN

## 2021-07-22 VITALS — WEIGHT: 170 LBS | BODY MASS INDEX: 29.02 KG/M2 | HEIGHT: 64 IN

## 2021-07-22 NOTE — PROGRESS NOTES
"OT/TR Assessment Note    Patient Name: Kavitha Headley    D: Patient was seen for 10 minutes for orientation to therapist role, group program, and assessment of needs. The following assessment(s) were used:    Occupational Self Assessment (JENN): Tool used to learn how the patient views his/her own life situation, allow reflection of strengths, needs, and values as well as structure a systematic way to think about and decide on what things the patient would most like to change in his/her life.  This tool promotes collaboration to identify therapy goals and strategies as well as empowers patients to guide their recovery.  Items are rated on how well the patient perceives their performance in a given occupation and how important each occupation is to the patient.  This establishes occupational areas of most difficulty that are rated of highest importance, to guide functional goal setting.      Areas identified with high importance and difficulty completing were: experiencing a satisfying routine that supports my wellness, participation in meaningful roles, planning and accomplishing what is important to me    Patient shared she is reluctant to stay in intensive inpatient treatment due to the \"strict rules\". She feels outpatient treatment may be a better fit, however, she is willing to explore the inpatient programming. 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 Patient stated \"I got devalued. I don't know who I am\". She does not engage in a purposeful/meaningful daily routine and has not been engaging in healthy leisure activities. Patient shared she enjoys walking, cooking, reading, knitting, and having lunch with friends. She has found 2 AA meetings she likes that she would like to continue with post discharge. Patient's goal is to find part time work and get a better routine. Patient was pleasant upon " approach and willing to participate in occupational self assessment. Her responses were relevant and insightful.     A: Patient would benefit from:  Exercise/wellness routine, leisure assessment and exploration, goal setting and follow through, building a purposeful routine    Pt appears to have the following strengths:  goal directed/motivated, engaged/appropriate participation, reality oriented, takes initiative, communicates effectively    Pt appears to have the following limitations/barriers:  maladaptive coping skills, lack of purposeful routine/daily structure     P: Initiate care plan goals and interventions. Initial goals will address problem solving barriers to recovery and identifying effective coping strategies. Encourage patient to attend group activities and discussions. Continue to assess needs for a long term recovery.    Patient participated in goal selection and understands the plan of care: Yes    Therapist:Juan Antonio Nix    7/25/2017

## 2021-07-22 NOTE — DISCHARGE SUMMARY
"Discharge summary (copied from a prior discharge summary note, completed under heading of progress note)    Patient has requested to leave earlier than scheduled based on \"feeling she can do it alone\", and is reporting that the combination of baclofen and gabapentin is very helpful with pain, anxiety and cravings. Her gabapentin was increased at night to 600-900 mg before bed to help with insomnia, and she is also on trazodone and melatonin.      Patient's temazepam should be discontinued, and she will be prescribed baclofen and increased gabapentin at night to continue as an outpatient. She has been scheduled to follow up with me in outpatient clinic on 8/24/2017.      Would recommend continuing with outpatient cd treatment and therapy if possible. Patient can be scheduled for a diagnostic assessment in outpatient mental health clinic if she desires, or see therapist of her choice. I strongly support her plan to attend frequent 12 step meetings after discharge.    Diagnosis: alcohol use disorder    Discharge Disposition: to home    Discharge condition: stable  "

## 2021-07-22 NOTE — PROGRESS NOTES
CD Counselor Intake Note      Kavitha Headley is a 71 year old,  female, referred to inpatient treatment by herself.      Based on client history, diagnostic impression:  Alcohol Use Disorder-Severe-(F10.20) (303.90), appropriate for inpatient treatment at this time. Individual abuse plan needs to be in place at this time, assessed at a low risk for suicidal thought/ideations using PANSI screen.  Denies danger to self or others at this time.  No sexual orientation or spirituality, race, age or ethnic origin issues identified at this time.      Assessment  Completed intake assessment summary. Client was presented and acknowledges receiving orientation to policies and procedures, grievance procedure, suspected maltreatment of a vulnerable adult policy and mandated  information, Facility Abuse Prevention Plan, program rules/regulations, client bill of rights, information related to tuberculosis and screening, and HIV/AIDS minimum standards information.     Presented Individual Abuse Prevention Plan, scored PANSI screen.  Master treatment plan was presented and assignments and expectations were explained.      Dimension #1: Withdrawal potential- Risk level 1- Mild Concern. Client reports daily use of alcohol; last use on 06/01/17.  Client reports some withdrawal symptoms; however, appears able to participate in treatment programming at this time.      Dimension #2: Biomedical concerns or complications - Risk level 1- Mild Concern. Client reports having the following health conditions/concerns: arthritis in back, knee replacements, and muscle aches.  Client is able to get her medical needs met and addressed as necessary.         Dimension #3: Emotional/Behavioral/Cognitivie concerns - Risk level 1- Mild Concern. Client reports symptoms of depression and anxiety; however, denies any MH diagnosis.  Client appears to lack some insight as to how her use may be impacting her mental health symptoms.  Client denies any  "current suicidal/homicidal ideations or attempts.      Dimension #4: Readiness for Change - Risk level 1- Mild Concern. Client reports she is seeking help for her alcohol use; however, reports she is questioning whether she needs to be in treatment currently due to medication complication and intake complications.  Client was cooperative during the intake process.      Dimension #5: Relapse potential - Risk level 4- Extreme Concern. Client reports a history of 1 previous outpatient treatment when she was 48 years old.  Client reports her longest period of sobriety was for 4 years (9112-0527).  Client reports she was able to maintain her sobriety by attending AA meetings and wanting \"people to be proud\" of her.  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  Client reports she struggled with not knowing what to do with herself and not knowing who she is as a person because she has been a nurse for so long that is all she has known.  This indicates client lacks effective coping skills to prevent relapse and puts client at high risk for further substance use at this time.      Dimension #6: Recovery Environment - Risk level 3- Serious Concern. Client reports she lives alone for part of the week and then her fiance comes over for the weekends.  Client reports her environment is supportive of her recovery; however, this is where she spends her time drinking alcohol is alone in her home.  Client reports she has been attending AA meetings \"at least once a week\".  Client reports she participates in the same activities when she is drinking alcohol or sober.  Client reports prior to coming to treatment she was working part time and had social security benefits.  Client reports she no longer has her job when she completes treatment.  This indicates client does not have any structured meaningful activities at this time.  Client denies any legal " involvement.      Recommendation:   Client in agreement to be here.  Signed releases.  Patient received and acknowledged understanding of policies and procedures.  Participated in and agree to Vulnerable Adult Assessment and Master Treatment Plans 1-6.  In agreement to discharge date of 08/14/17.      Treatment:  Oriented to treatment process, explained releases, policies and procedures, Vulnerable Adult Assessment and Master Treatment Plans 1-6.  Discharge date:  08/14/17.      Plan:  Patient will attend IP treatment for 21 days, be medication compliant, will attend all groups and unit functions, and will follow aftercare recommendations.    Counselor Name:  DAVON Nicole    Date:  7/25/2017  Time:  8:53 AM
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
## 2021-07-22 NOTE — PROGRESS NOTES
Patient reports baclofen is helping her with cravings and insomnia. She would like to continue this medication and increased dose of gabapentin. She did require one dose of ativan last night for ciwa of 10. Ciwa to be d/genna today. Reiterated that she should remain off temazepam. Patient reports a plan to d/c in the next few days, and we discussed in multidisciplinary programming there is no medical contraindication to this plan. Patient to follow up in clinic with myself in 1 month regarding alcohol cravings. Ideally she should continue outpatient, but it is possible this is not covered. She plans to attend AA meetings upon discharge.

## 2021-07-22 NOTE — PROGRESS NOTES
"  Clinical Nutrition Therapy Assessment Note      Reason for Assessment:   Kavitha Headley is a 71 y.o. female assessed by the registered Dietitian for consult - \"low protein, h/o etoh use disorder, eval for diet recommendations\".    Nutrition History:  Pt with a h/o bariatric surgery in 1996.  She can't remember the type of surgery except that there were no staples, so she thinks it might have been [id number].  She took vitamin B12 for a while but did not keep up with it over the years.  She has not been drinking since June 1, and she has noticed some slight wt loss since then, because her calories from alcohol were in addition to her usual food intake.  She says she can eat about half of a usual meal, and she uses a protein shake at home mid-morning that provides 36 grams of protein.  She couldn't remember the name of it.  She says she has only had 1 episode of dumping syndrome since her surgery.      Current Nutrition Prescription:   Diet: regular with 2 unit snacks pm and hs  Supplements and Modulars:Diet Supplements: Multivitamins    Current Nutrition Intake:  Pt is eating % of average-size meals.  She says she is having some withdrawal symptoms from benzo withdrawal occasionally, which may be limiting her appetite some.    Anthropometrics:  Height: 5' 4\" (162.6 cm)  Admission weight:170#  Weight: 170 lb (77.1 kg)  BMI (Calculated): 29.2  BMI indication: 25-29.9 overweight  Ideal body weight 120#  % Ideal body weight 142%  Weight History:168# 5/2014    Physical Findings:  The patient has the following physical signs which could indicate malnutrition: none    GI Status: WDL per nursing    Skin/Wound:  Eric score Eric Scale Score: 22    Medications:  Medications reviewed.  Pt is on folate, MVI, and thiamine.    Labs:  Labs reviewed.  Mg 1.5 with no replacement ordered.  Albumin 2.4.  Albumin is not an indicator of malnutrition or lack of protein in the diet, but rather severity of illness.    Assessed " Nutritional Needs:  Assessment weight is 60 kg, adjusted body weight    Estimated Energy Needs: 5244-8735 kcals daily, or 20-25 kcal/kg     Estimated Protein Needs: 48-60 g daily, 0.8-1.0 g/kg.    Estimated Fluid Needs: 7549-0869 mls daily, 25-30 mls/kg    Malnutrition: Not noted    Nutrition Risk Level: no risk    Intervention:  Will add 1 Optisource supplement in the am.  Recommend replacing or supplementing Mg.  Also recommend the following bariatric vitamins based on her altered GI - sublingual vitamin B12 8156-5634 mcg 1 time daily, calcium citrate with vitamin D 500-600 mg 2 times daily, and vitamin D3 5000 IU daily.    Monitoring:  prn

## 2021-07-22 NOTE — PROGRESS NOTES
Patient attended support group on this date. She discussed her plan to DC 7/29/2017 which is far earlier than 21 days. She does not believe she needs this level of care, she has remained abstinent since 6/1/2017 on her own. Writer consulted with her medical provider who is in agreement with the discharge.

## 2021-07-22 NOTE — H&P
"Addiction Medicine  Inpatient Initial Evaluation    Admission History and Physical   Kavitha Headley,  1945, MRN [id number]    [facility]  [facility] Dep    PCP: Alison Pena MD, [unfilled], [phone]   Code status:  Full Code       Extended Emergency Contact Information  Primary Emergency Contact: Nery Murphy   [facility]  Home Phone: [phone]  Relation: Child  Secondary Emergency Contact: Sander Kruger   [facility]  Home Phone: [phone]  Relation: Significant Other         Chief Complaint: \"I need to stop drinking\"     Admission Status:  Voluntary    Code Status:  Full    HPI:    Kavitha Headley is a 71 y.o. female with a history of severe alcohol use disoder who presents for residential treatment and detoxification from alcohol and sedative-hypnotics. Patient reports she has not had a drink since , and does attend 12 step meetings. She has never been in residential treatment before, but did do outpatient treatment about 15 years ago. She stayed sober for years after this. 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 She has never been on anticraving medications, but denies any current cravings to drink. She lives with a fiancee who also has a history of alcohol use disorder, and notes he drank 2 days ago after a 2 year period of sobriety. Despite this, she feels her house will be a safe environment to return to after completing treatment. Of note, patient has been on temazepam for anxiety for years, and would like to continue this medication. She has never taken more than prescribed. Patient was provided with a 10-day supply of klonopin to help her with withdrawal symptoms in the outpatient settings. Patient reports she used this as prescribed, and denies any history of a sedative use disorder. She denies any history of using illicit drugs, and specifically denies using marijuana, cocaine, or opioids. She has " "been briefly prescribed ultram for bilateral knee pain in the past, and needed this while hospitalized for having cellulitis.     In terms of her mood, patient reports a low feeling and sustained depression. She has not had any thoughts of self-harm, and has seen a psychiatrist in the past. She has not done regular therapy, but would be interested in this. She does have anxiety at times, but this has not been disabling. She is on remeron, which was started by a psychiatrist.     Patient notes a fall a few days ago in which she developed knee pain. She states this has now completely resolved, and denies any association with alcohol or head injury. She has had edema in her ankles bilaterally since having cellulitis in both legs. No current fever, warmth or pain of the area.     History is provided by patient    Prior to hospital admission , frequency of use was daily. (prior to June 1)  Amount >4 drinks  Route by mouth  Last use = about June 1           Clinical Global Impressions:  Borderline ill    Substance Abuse Treatments:  Number: 1 outpatient treatment 15 years ago.  Indication(s): alcohol    Addiction History:  History of:    Alcohol-withdrawal seizure No   Delirium tremens:No   Alcohol induced \"blackouts\" :No   Other drugs of use no.    Tobacco use:No    Psychiatric History:  Reports a history of trying antidepressants, and states, \"they all do the opposite of helping with depression for me\". Major depressive disorder    Past Medical History:  S/p bilateral knee replacement; recently hospitalized in June with bilateral cellulitis, required a picc line.  History of gastric bypass, s/p multiple c-sections      Pain Medicine History:  not evaluated    Social History: Currently has a fiancee, who lives with her half the time. Also has family nearby, including a grandson she is close with. 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   Reviewed, and she    Social History     Social History     " Marital status:      Spouse name: N/A     Number of children: N/A     Years of education: N/A     Occupational History     Not on file.     Social History Main Topics     Smoking status: Not on file     Smokeless tobacco: Not on file     Alcohol use Not on file     Drug use: Not on file     Sexual activity: Not on file     Other Topics Concern     Not on file     Social History Narrative       Legal History:  Number of arrests: none.  Longest period of incarceration:denies      Prior to Admission Medications   Prescriptions Prior to Admission   Medication Sig Dispense Refill Last Dose     diphenhydrAMINE (BENADRYL) 25 mg capsule Take 25 mg by mouth every 6 (six) hours as needed for itching.   Unknown at Unknown time     folic acid (FOLVITE) 400 MCG tablet Take 400 mcg by mouth daily.   Unknown at Unknown time     furosemide (LASIX) 20 MG tablet Take 20 mg by mouth daily.   7/19/2017 at Unknown time     gabapentin (NEURONTIN) 300 MG capsule Take 300 mg by mouth 3 (three) times a day.   7/24/2017 at am     melatonin 3 mg Tab tablet Take 3 mg by mouth at bedtime as needed.   7/23/2017 at pm     metoclopramide (REGLAN) 5 MG tablet Take 5 mg by mouth 2 (two) times a day.   7/23/2017 at pm     mirtazapine (REMERON) 7.5 MG tablet Take 7.5 mg by mouth at bedtime.   7/23/2017 at pm     naproxen (NAPROSYN) 500 MG tablet Take 500 mg by mouth. Takes 2-3 times daily   7/24/2017 at am     spironolactone (ALDACTONE) 100 MG tablet Take 100 mg by mouth daily.   7/23/2017 at pm     sucralfate (CARAFATE) 1 gram tablet Take 1 g by mouth daily.   7/23/2017 at pm     temazepam (RESTORIL) 15 mg capsule Take 15 mg by mouth daily.   7/23/2017 at pm     thiamine HCl, vitamin B1, (VITAMIN B-1) 250 MG tablet Take 250 mg by mouth daily.   Unknown at Unknown time     traZODone (DESYREL) 100 MG tablet Take 100 mg by mouth daily.   7/23/2017 at pm          Allergies:  Allergies   Allergen Reactions     Codeine Nausea Only     Morphine  Nausea Only     Seroquel [Quetiapine]      Bactrim [Sulfamethoxazole-Trimethoprim] Rash     Rash all over the body       [place] Prescription Monitoring Program:  No worrisome pharmacy activity    Review of Systems:  Pertinent items are noted in HPI.      Constitutional    Cravings for alcohol or other drugs:No   Withdrawing from alcohol or other drugs:No   Recent fever:No    Head   Nasal congestion or rhinorrhea:No   Xerostomia:No   Dental pain or luxation:No     Cardiovascular   Chest pain at rest or with exertion:No   Orthopnea or paroxysmal nocturnal dyspnea:No   Palpitations: No   Syncope:No   Ortho stasis:No   Peripheral edema:No    Pulmonary   Dyspnea:No   Chronic cough or wheeze:No    Gastrointestinal    Appetite: normal   Nausea, vomiting, or diarrhea: no    Neurologic   Headache:denies   Akathisia: No   Recent seizure:No   Tremor or myoclonus:No    Psychiatric   Depressed, irritable, or elevated mood:yes, low mood   Anxiety:feeeling anxious   Suicidal or homicidal ideation:No   Cognition and memory: normal   Sleep: reports insomnie    Rheumatologic   Myalgias, arthralgias, or ostealgias: No    Endocrine   Energy level :normal   Weight:stable   Physical Exam:  Temp:  [97.4  F (36.3  C)-98.8  F (37.1  C)] 98.8  F (37.1  C)  Heart Rate:  [] 106  Resp:  [16-20] 20  BP: (111-150)/(61-88) 117/61  Vitals:    07/25/17 0800   BP: 117/61   Pulse: (!) 106   Resp: 20   Temp: 98.8  F (37.1  C)   SpO2: 97%       General appearance   Level of consciousness:awake, alert, and oriented; no jaundice     HEENT   Scleral icterus: No    Dentition and gingiva:normal   EOMI    Cardiovascular      Regular rate and rhythm, no murmurs noted     Pulmonary   Lungs: Completely normal to ausculation :Yes      Abdomen   Tender to palpitation: No   +BS, no hepatomegaly, no masses    Extremities   No clubbing, cyanosis;    Peripheral edema in the distal lower extremities:present, right >left; no warmth or tenderness to  palpation    Neurologic   Orientation:person, place, time and situation   Attention:Able to recite the days of the week in reverse order   Postural tremor in the upper extremities: none    Endocrine   Proptosis:absent     Dermatologic   Jaundice:absent    Psychiatric Mental Status Examination      Grooming, dress, and hygiene:normal   Attitude:Cooperative   Mood:    Euthymic:Calm    Dysphoric:None    Euphoric: None    Apathetic:None    Angry:None    Apprehensive:None     Affect: Range:Full    Intensity:within normal limits       Eye contact:Appropriate   Speech:Normal   Thought content:Normal   Thought process:Normal   Perceptions:Normal   Insight and judgement:minimization of illness; resistent to the idea of stopping temazepam       Pertinent Labs  Lab Results: Personally reviewed by me; patient with low albumin and low magnesium. LFTs high-normal.       Assessment and Plan   71-year-old woman with a history of alcohol use disorder who presents for inpatient treatment.   Principal Problem:    Alcohol use disorder, severe, dependence       1. Patient to be monitored on ciwa protocol, and did get ativan last night. Would expect needs for medication to be minimal given last use of alcohol in June, however patient was recently on klonopin and temazepam. We discussed the danger of being on a sedative hypnotic with a history of alcohol use disorder, and temazepam was d/genna. Patient gabapentin will be increased at night to 600 mg in addition to 300 mg bid. Order allows for going up to 900 mg of gabapentin before bed if needed.  2. Patient encouraged to take advantage of all groups and treatment.  3. Will order diagnostic assessment for psychotherapy. Continue mirtazapine.Consider psychiatry prn.   4. Consult dietician for low protein, recommendations appreciated.  5. We discussed anticraving medications for alcohol, and patient will potentially start baclofen if cravings develop. We discussed options of naltrexone and  acamprosate as well.  6. Patient may benefit [facility] given her report of her fiancee recently relapsing on alcohol, but would defer to cd team.  7. Continue trazodone and melatonin for insomnia. Consider increasing mirtazapine if insomnia is persistent.  8. Continue naproxen prn for pain, and spironolactone for hypertension.   9. Miralax scheduled for constipation.      Advanced Care Planning  Discharge Planning per cd team  Management of patient for 75 minutes, with greater than 50% of time spent in counseling and coordination of care.

## 2021-07-22 NOTE — PROGRESS NOTES
"Addiction Services - Initial Services Plan      Name:  Kavitha Headley       :  1945        MRN:  [id number]    Goal Methods   1.  Acceptance of chemical dependency and mental illness as a disease. A.  Comply with all med/psych recommendations  B.  Complete preliminary interviews  C.  Attend all program functions  D.  Attend all individual counseling sessions  E.  Read all assigned literature  F.  Complete psychological testing as assigned  G.  Particpate in any necessary consultations     2.  Acceptance of my need and ability to change A.  Complete written workbook assignments on MICD  B.  Participate in conferences (P.O., , family)  C.  Participate in 12 Step/support groups  D.  Actively participate in treatment planning and reviews  E.  Complete all assignments given or recommended on Treatment Plan  F.  Present Drug History/Peer Assessment with peer group  G.  Complete 10th Step Inventory daily   3.  Acceptance of staff recommendations as a means to my recovery A.  Participate in all interviews for Continuum of Care Plans  B.  Familiarize self with 12 Step Recovery Program and how this applies to your day-to-day behavior  C.  Demonstrate a working knowledge of AA steps of recovery  D.  Obtain a sponsor     Patient describes their immediate need:  Help to stay off alcohol  Are there any immediate Safety Needs such as (physical, stability, mobility):none    Immediate Health Needs and Plan:    [place] to unit and continue with regular meds  Vulnerable Adult:  yes    [] Continue Current Medications for:   [] Request Consult for:  [] Notify Attending Physician about:  [] Other:      Issues to be addressed i the first sessions:    Length of stay, meet with counselor to establish plan of care. Meet with  for H&p. Start to attend groups as is not in withdrawal.    Patient strengths and needs:  \"good mom and grandmother\"    Plan for patient for time between intake and completion of the treatment " plan:  Work on assignments and attend groups with participation in treatment plan.    Staff Members' Titles authorized to Initiate Services are:    Director of Behavioral Service    Clinical Director of Chemical Dependency    Primary Counselor    MI/WING Sr. Counselor        Nursing Staff    Vulnerable Adult Review  [] Review of the facility Abuse Prevention plan was reviewed with the patient  [] No individual abuse plan is necessary  [] In addition to the facility Abuse Prevention plan, an Individual Abuse Plan will be put in place    I understand these goals to be the Treatment Goals of the Program, and I agree to the stated Methods in attempting to accomplish these goals.    Patient Signature:  _________________________Date:  7/24/2017      Staff Name/Title: RAMAN Rivas RN   Date:  7/24/2017  Time: 2:50 PM

## 2021-07-22 NOTE — PROGRESS NOTES
Pt attended group on Spirituality, was engaged in group activity and discussions.  to follow up and offer support as needed.    philip Shane M.A., Ph.D.

## 2021-07-22 NOTE — PROGRESS NOTES
Patient is 71-year-old woman with a history of alcohol use disorder and comorbid sedative use who presents for residential treatment and detoxification. Patient to be treated on a ciwa protocol with lorazepam prn. Appreciate patient reports a history of depression, and may benefit from psychotherapy. Patient admit orders placed, and labs to be drawn tomorrow.  shows clonazepam and ultram, therefore consider that patient may benefit from scheduled phenobarbitol taper depending on symptoms.

## 2021-07-22 NOTE — PROGRESS NOTES
07/25/17 1407   Engagement   Intervention Group   Topic Daily living skills   Topic Detail Eight dimensions of wellness to encourage longer, healthier lives in recovery, to identify healthy routines and habits, to create balance in daily living, to explore needs for recovery   Attendance Attended   Patient Response Was respectful   Concentrated on Task duration of group   Mood / Affect  Pleasant   Social/Behavioral  Engaged     Juan Antonio Nix

## 2021-07-22 NOTE — PROGRESS NOTES
Pt to desk at this time.  States she had been sleeping but woke up at this time.  Requesting 'some more of that ativan so I can go back to sleep'.  See v/s and ciwa flow sheet.  Rates her anxiety level 4-5/5.  States 'I'm always anxious'.  Gabapentin 300mg po given for 4-5/5 anxiety.  Lavender essential oil given.  No other concerns/complaints voiced.  Instructed pt to let staff know of any change in status or questions/concerns.  Pt agreed to do so.  Returned to room.

## 2021-07-22 NOTE — PROGRESS NOTES
"[facility]   Mental Health and Addiction Care   [facility], [facility], [facility]    [phone] or [phone]   Master Plan       Client Name:   Kavitha Headley  Counselor:    Rosemarie Castillo      Title:   Dimension 1 Withdrawal Potential  Plan Date:   7/25/2017  Diagnosis:   Risk level 1, mild concern  Problem:   Client reports symptoms of withdrawal.     Goal:  Begin Date: 7/24/2017 Target Date: 08/14/17 Date Completed:   /  /          Client to detox safely from all substance use, and remain drug and alcohol free.        Method 1: Begin Date: 7/24/2017 Target Date: 08/14/17 Date Completed:   /  /       Client to report any withdrawal concerns to nursing staff and provider.    Method 2: Begin Date: 7/24/2017 Target Date: 08/14/17 Date Completed:   /  /         Client to remain drug and alcohol free.        Title:   Dimension 2 Biomedical  Plan Date:   7/25/2017  Diagnosis:   Risk level 1, mild concern  Problem:   Client reports having the following health conditions/concerns: arthritis in back, knee replacements, and muscle aches.       Goal:  Begin Date: 7/24/2017 Target Date: 08/14/17 Date Completed:   /  /          Client will maintain good health.        Method 1: Begin Date: 7/24/2017 Target Date: 08/14/17 Date Completed:   /  /       Client will report any new or worsening medical concerns to nursing or provider.   Method 2: Begin Date: 7/24/2017 Target Date: 08/14/17 Date Completed:   /  /         Client will read and complete \"coping with addiction and other medical problems\" and discuss with counselor.       Title:   Dimension 3 Emotional/Behavioral/Cognitive Conditions and Complications  Plan Date:   7/25/2017  Diagnosis:   Risk level 1, mild concern  Problem: Client reports symptoms of depression and anxiety; however, denies any MH diagnosis.     Goal:  Begin Date: 7/24/2017 Target Date: 08/14/17 Date Completed:   /  /          Client will explore mental health symptoms as it " "relates to her addiction.        Method 1: Begin Date: 7/24/2017 Target Date: 08/14/17 Date Completed:   /  /       Client will read and complete \"my anxiety profile\" and discuss with counselor.   Method 2: Begin Date: 7/24/2017 Target Date: 08/14/17 Date Completed:   /  /         Client will read and complete \"learning to self-sooth\" and discuss with counselor.     Method 3: Begin Date: 7/24/2017 Target Date: 08/14/17 Date Completed:   /  /         Client will report any new or worsening mental health symptoms to nursing or provider.       Title:   Dimension 4 Treatment Acceptance/Resistance  Plan Date:   7/25/2017  Diagnosis:   Risk level 1, mild concern  Problem:   Client reports she is seeking help for her alcohol use; however, reports she is questioning whether she needs to be in treatment currently due to medication complication and intake complications.      Goal:  Begin Date: 7/24/2017 Target Date: 08/14/17 Date Completed:   /  /          Client will demonstrate her dedication to treatment.          Method 1: Begin Date: 7/24/2017 Target Date: 08/14/17 Date Completed:   /  /       Client will read and complete \"setting and pursuing goals in recovery\" and discuss with counselor.   Method 2: Begin Date: 7/24/2017 Target Date: 08/14/17 Date Completed:   /  /         Client will attend all groups and functions and actively participate.   Method 3: Begin Date: 7/24/2017 Target Date: 08/14/17 Date Completed:   /  /         Client will meet with counselor weekly to discuss treatment plan and check in.      Title:   Dimension 5 Relapse/Continued Use/Continued Problem Potential  Plan Date:   7/25/2017  Diagnosis:   Risk level 4, extreme concern  Problem:   Client lacks coping skills to prevent relapse.  Client reports she struggled with not knowing what to do with herself and not knowing who she is as a person when she was laid off work.     Goal:  Begin Date: 7/24/2017 Target Date: 08/14/17 Date Completed:   /  /    " "      Client will learn coping skills to prevent relapse.        Method 1: Begin Date: 7/24/2017 Target Date: 08/14/17 Date Completed:   /  /       Client to identify 5 relapse triggers and 5 stressors, develop plan of action for each and share with counselor   Method 2: Begin Date: 7/24/2017 Target Date: 08/14/17 Date Completed:   /  /          Client to identify 5 life style changes she needs to make to help reduce the risk of relapse and share with counselor     Method 3: Begin Date: 7/24/2017 Target Date: 08/14/17 Date Completed:   /  /         Client will read and complete \"values and who am I\" and discuss with counselor.    Title:   Dimension 6 Recovery Environment  Plan Date:   7/25/2017  Diagnosis:   Risk level 3, serious concern  Problem:   Client lacks structured activities in her life since she is laid off from her job.     Goal:  Begin Date: 7/24/2017 Target Date: 08/14/17 Date Completed:   /  /          To complete an after care plan to include structure in her life.        Method 1: Begin Date: 7/24/2017 Target Date: 08/14/17 Date Completed:   /  /       Client will complete an after care plan with counselor prior to discharge.   Method 2: Begin Date: 7/24/2017 Target Date: 08/14/17 Date Completed:   /  /         Client will read and complete \"assessing my needs\" and discuss with counselor.   Method 3: Begin Date: 7/24/2017 Target Date: 08/14/17 Date Completed:   /  /         Client will read and complete \"identifying and using community resources\" and discuss with counselor.        By signing this document, I am acknowledging that I was actively and directly involved in the development of my treatment plan.          Client Signature_________________________________________         Date__________________        Staff Signature_________________________________________           Date__________________    "

## 2021-07-22 NOTE — PROGRESS NOTES
Admit for alcohol abuse. Has never had in-patient treatment us outpatient. Stable on admit and pleasant. States nervous about admission. 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 Last drank was in June so denies any withdrawal possibility. [facility] tox screen positive for benzo but on telazapam at [facility] for sleep Rx. Recently had fall on both knees past few days with no apparent injury but had had bilateral knee surgery in the past 5-10 years so as of today uses cane to help be steady.Denies any other drug use. Denies severe depression or feelings of wanting to hurt self. Has recent history of bilateral leg cellulitis. States antibiotics have been done but still appear tight with swelling but not redness. Also on med for ulcer in esophagus. Admitted to room and will notify  Of admit.

## 2021-07-22 NOTE — PROGRESS NOTES
Inpatient Addiction Care Treatment Plan Update   Kavitha Headley  1945  [id number]      Projected discharge date is 7/29/2017.        Discharge Plan: return to previous living arrangement, AA meetings. She believes she does not need this level of care because she remained abstinent on her own for 1.5 months before admitting. Her insurance won't cover CD OP in her area.  Barriers to Discharge:  None at this time   Patient Strengths: motivated, hopeful  Vulnerabilities: Lack of social support, Poor judgment , Poor insight and Poor coping skills    Patient Involvement:  Check all that apply:   [x] Yes   [] No     [] NA  Patient involved in treatment plan development   [] Yes   [x] No     [] NA  Family involved in treatment plan development   [] Yes   [x] No     [] NA  Significant Other involved in treatment plan development   [x] Yes   [] No     [] NA  Treatment Plan discussed with patient   [x] Yes   [] No     [] NA  Patient concurs with treatment plan    Treatment Team: See below    This Treatment plan was reviewed with providers present and in agreement and share responsibilities of patient outcomes.    Providers present at review:  Title:   Present:  Initials  RN      [x] Yes   [] No   CL    SW   [] Yes   [x] No          MD/PA  [x] Yes   [] No  LS        OT/TR   [x] Yes   [] No  CM      Psychology  [x] Yes   [] No  JR MAYS   [x] Yes   [] No  MR        Clin. Prgm Manager [] Yes   [] No            Glo Stovall MA [facility]  7/27/2017, 2:58 PM

## 2021-07-22 NOTE — PROGRESS NOTES
"PHQ-9 = 5; NITHIN-7 = 6; PCL-5 = 17; event: \"hospitalized for cellulitis in my leg\" 6/1/2017-6/7/2017      PHQ-9 ?10 suggestive of depression 1-4 Minimal depression; 5-9 mild depression; 10-14 moderate depression; 15-19 moderate severe depression; 20-27 severe depression   NITHIN-7 ?10 suggestive of anxiety; 15=severe anxiety   PCL-5 cutoff score  33 or higher      "

## 2021-07-22 NOTE — PROGRESS NOTES
Staff Medical Assessment or See Intake Form    Name:  Kavitha Headley  :  1945  MRN:  [id number]    What circumstances led you to seeking this evaluation and/or treatment  Felt alcohol was taking over my life         Ask all Female patient if they are pregnant  [] Yes # months ______  [x] No     First Drug of choice: alcohol   Second:  noneThird:  none     DRUGS First use  Date & Time of Last use  Duration How often in past 12 months Amount in the past 12 months # of days used in past 30 days Route of Use Smoking, Oral, IV injection, Nasal Period of Abstinence   Alcohol   43 years old     Since 43 years old  4-5 drinks per day.  none     Marijuana   none              Non prescription Methadone     none            PCP     none            Opiates / Pain Pills   none              LSD / Acid / Etc   none              Cocaine / Crack     none                DRUGS First use  Date & Time of Last use  Duration Frequency in past 12 months Amount in the past 12 months # of days used in past 30 days Route of Use Smoking, Oral, IV injection, Nasal Period of Abstinence   OTC  Over the Counter  (Name the Drug) none          Tobacco                 Barbiturates or Sedatives     none            Ecstasy / Club drugs     none            none           BENZODIAZEPINES   none          Other Inhalant                       Problems / Staff Assessment  Questions to consider and answer     [] N/A  Check those symptoms the patient is experiencing now   Is acute intoxication and/ or withdrawal potential contributing to or complicating the client s conditions?  Are there current signs of current withdrawal? []  Sweating   []  Rapid Pulse []  Agitation/Irritability    []  Fever   [x]  Muscle aches []  Fatigue    []  Vivid Dreams []  Headaches []Increase perception of sound    [x]  Anxiety   []  Tearing []  Runny nose    []  Diarrhea   []  Dizziness [x]  GI upset    []  Increased/ Decrease Appetite []  Insomnia []  Yawning    []   Nausea/Vomiting   []  Seizure    What Actions need to be taken based on the patient s symptoms of withdrawal? Check the actions most appropriate for this patient    []  Patient Room assigned will be close the nurses station      []  Vital Signs will be taken _every shift or as needed___________ (frequency) and observed      []  Fluid intake and output will be observed      []  Will inform physician of systems and potential need for medication    assistance for withdrawal systems.   Withdrawal potential     Check what you think the patient s likely activity will be    []  Will not be able to be active in CD program during withdrawal      [x]  Will be able to be active in CD program during withdrawal       Patients ability to cope with withdrawal symptoms and current state of intoxication.     Can cope, denies withdrawal                           Staff Name and TitleM. Rob DILLON    Date:  2017    Time:  1:43 PM    Name:  Kavitha Headley  :  1945  MRN:  [id number]

## 2021-07-22 NOTE — PROGRESS NOTES
Patient discharged with staff approval. Patient is not suicidal or homicidal. All belongs sent with patient. Scripts given to patient. All discharge instructions reviewed with patient; patient verbalized understanding. Samantha Anderson RN

## 2021-07-22 NOTE — PROGRESS NOTES
Progress Notes by Dinora Sommer Pharmacy Student at 7/24/2017  2:50 PM     Author: Dinora Sommer Pharmacy Student Service: Pharmacy Author Type: Pharmacy Student    Filed: 7/24/2017  3:16 PM Date of Service: 7/24/2017  2:50 PM Status: Attested    : Dinora Sommer Pharmacy Student (Pharmacy Student)    Related Notes: Original Note by Dinora Sommer Pharmacy Student (Pharmacy Student) filed at 7/24/2017  2:59 PM    Cosigner: Tanvi Goode PharmD at 7/24/2017  3:18 PM    Attestation signed by Tanvi Goode PharmD at 7/24/2017  3:18 PM    I have reviewed the admission medication history note documented by the student and agree with her assessment.           - Tanvi Goode PharmD, BCPS 7/24/2017 3:18 PM                      Pharmacy Note - Admission Medication History    Pertinent Provider Information: Patient indicates that she takes ranitidine QD and venlafaxine BID (doses unknown); however there are no records of fill or prescriptions at all three pharmacies that she fills at.  Please address indications for these medications.     ______________________________________________________________________    Prior To Admission (PTA) med list completed and updated in EMR.       PTA Med List   Medication Sig Note Last Dose   ? diphenhydrAMINE (BENADRYL) 25 mg capsule Take 25 mg by mouth every 6 (six) hours as needed for itching.  Unknown at Unknown time   ? folic acid (FOLVITE) 400 MCG tablet Take 400 mcg by mouth daily.  Unknown at Unknown time   ? furosemide (LASIX) 20 MG tablet Take 20 mg by mouth daily. 7/24/2017: Takes differently.  Takes one tablet once weekly. 7/19/2017 at Unknown time   ? gabapentin (NEURONTIN) 300 MG capsule Take 300 mg by mouth 3 (three) times a day. 7/24/2017: Patient takes differently- 300mg BID 7/24/2017 at am   ? melatonin 3 mg Tab tablet Take 3 mg by mouth at bedtime as needed.  7/23/2017 at pm   ? metoclopramide (REGLAN) 5 MG tablet Take 5 mg by mouth 2 (two) times a day.  7/23/2017  at pm   ? mirtazapine (REMERON) 7.5 MG tablet Take 7.5 mg by mouth at bedtime.  7/23/2017 at pm   ? naproxen (NAPROSYN) 500 MG tablet Take 500 mg by mouth. Takes 2-3 times daily  7/24/2017 at am   ? spironolactone (ALDACTONE) 100 MG tablet Take 100 mg by mouth daily.  7/23/2017 at pm   ? sucralfate (CARAFATE) 1 gram tablet Take 1 g by mouth daily.  7/23/2017 at pm   ? temazepam (RESTORIL) 15 mg capsule Take 15 mg by mouth daily.  7/23/2017 at pm   ? thiamine HCl, vitamin B1, (VITAMIN B-1) 250 MG tablet Take 250 mg by mouth daily.  Unknown at Unknown time   ? traZODone (DESYREL) 100 MG tablet Take 100 mg by mouth daily.  7/23/2017 at pm       Information source(s): Patient and Patient's pharmacy    Summary of Changes to PTA Med List  New: All medications newly added to patient's chart.    Patient was asked about OTC/herbal products specifically.  PTA med list reflects this.    Based on the pharmacists assessment, the PTA med list information appears somewhat reliable:due to poor historian    Patient appears compliant: Yes    Allergies were reviewed, assessed, and updated with the patient.      Patient does not use any multi-dose medications prior to admission.    Thank you for the opportunity to participate in the care of this patient.    Dinora Sommer, Pharmacy Student  7/24/2017 2:56 PM

## 2021-07-22 NOTE — PROGRESS NOTES
[facility] ADDICTIONS SERVICE PROGRESS NOTE:     PATIENT NAME: Kavitha Headley   DATE OF SERVICE: 7/26/2017    CHIEF COMPLAINT   insomnia    INTERVAL HISTORY   The patient is a history of alcohol use disorder currently here for inpatient treatment, who reports significant insomnia last night. She reports she tried taking gabapentin 900 mg last night, and that this caused her to feel out of it and strange. She really would like to return to being on temazepam, and reports she plans to use an ativan tonight to help with withdrawal symptoms. She has been on baclofen in the past for pain, and reports this was a helpful medication. She is open to trying this again, and is hopeful it would also help with alcohol cravings. Patient has been attending groups.    She does not report fever, fatigue, chest pain or shortness of breath.     REVIEW OF SYSTEMS     Pertinent items are noted in HPI.    Side Effects:  Yes: reports gabapentin caused her to feel very drowsy, and she feels she should stick with 600 mg dose.    CHART REVIEW & MULTIDISCIPLINARY TEAM MEETING   Met with the multidisciplinary treatment team and discussed care and treatment planning.  Staff Report: n/a           OBJECTIVE      LABS:   Recent Results (from the past 24 hour(s))   Magnesium   Result Value Ref Range    Magnesium 1.5 (L) 1.8 - 2.6 mg/dL   Basic Metabolic Panel   Result Value Ref Range    Sodium 140 136 - 145 mmol/L    Potassium 4.4 3.5 - 5.0 mmol/L    Chloride 110 (H) 98 - 107 mmol/L    CO2 25 22 - 31 mmol/L    Anion Gap, Calculation 5 5 - 18 mmol/L    Glucose 75 70 - 125 mg/dL    Calcium 8.3 (L) 8.5 - 10.5 mg/dL    BUN 13 8 - 28 mg/dL    Creatinine 0.78 0.60 - 1.10 mg/dL    GFR MDRD Af Amer >60 >60 mL/min/1.73m2    GFR MDRD Non Af Amer >60 >60 mL/min/1.73m2       Current Facility-Administered Medications   Medication Dose Route Frequency Provider Last Rate Last Dose     baclofen half-tablet 5 mg (LIORESAL)  5 mg Oral TID Emily A Brunner,  MD         folic acid tablet 1 mg (FOLVITE)  1 mg Oral DAILY Emily A Brunner, MD   1 mg at 07/26/17 0929    Or     folic acid injection 1 mg  1 mg Intramuscular DAILY Emily A Brunner, MD         [START ON 8/3/2017] folic acid tablet 400 mcg (FOLVITE)  400 mcg Oral DAILY Emily A Brunner, MD         gabapentin capsule 100-300 mg (NEURONTIN)  100-300 mg Oral TID PRN Emily A Brunner, MD   300 mg at 07/25/17 1333     gabapentin capsule 300 mg (NEURONTIN)  300 mg Oral BID Emily A Brunner, MD   300 mg at 07/26/17 0928     gabapentin capsule 600-900 mg (NEURONTIN)  600-900 mg Oral QHS Emily A Brunner, MD   900 mg at 07/25/17 2203     haloperidol tablet 2 mg (HALDOL)  2 mg Oral Q4H PRN Emily A Brunner, MD        Or     haloperidol lactate injection 2 mg (HALDOL)  2 mg Intramuscular Q4H PRN Emily A Brunner, MD         LORazepam tablet 1 mg (ATIVAN)  1 mg Oral Q1H PRN Emily A Brunner, MD   1 mg at 07/25/17 2215    Or     LORazepam injection 1 mg (ATIVAN)  1 mg Intravenous Q1H PRN Emily A Brunner, MD        Or     LORazepam injection 1 mg (ATIVAN)  1 mg Intramuscular Q1H PRN Emily A Brunner, MD         melatonin tablet 3 mg  3 mg Oral Bedtime PRN Emily A Brunner, MD   3 mg at 07/25/17 2204     metoclopramide tablet 5 mg (REGLAN)  5 mg Oral BID Emily A Brunner, MD   5 mg at 07/26/17 0928     mirtazapine tablet 7.5 mg (REMERON)  7.5 mg Oral QHS Emily A Brunner, MD   7.5 mg at 07/25/17 2204     multivitamin therapeutic tablet 1 tablet  1 tablet Oral DAILY Emily A Brunner, MD   1 tablet at 07/26/17 0929     naproxen tablet 500 mg (NAPROSYN)  500 mg Oral BID with meals Hakeem Ferrell PA-C   500 mg at 07/26/17 0929     ondansetron tablet 4 mg (ZOFRAN)  4 mg Oral Q6H PRN Emily A Brunner, MD         polyethylene glycol packet 17 g (MIRALAX)  17 g Oral DAILY Emily A Brunner, MD   17 g at 07/26/17 0931     spironolactone tablet 100 mg (ALDACTONE)  100 mg Oral DAILY Emily A Brunner, MD   100 mg at 07/26/17 0929     sucralfate tablet 1 g  "(CARAFATE)  1 g Oral DAILY Emily A Brunner, MD   1 g at 07/26/17 0929     thiamine tablet 100 mg  100 mg Oral DAILY Emily A Brunner, MD   100 mg at 07/26/17 0929    Or     thiamine injection 100 mg (vitamin B1)  100 mg Intramuscular DAILY Emily A Brunner, MD         [START ON 8/3/2017] thiamine tablet 250 mg  250 mg Oral DAILY Emily A Brunner, MD         traZODone tablet 100 mg (DESYREL)  100 mg Oral QPM Hakeem Ferrell PA-C   100 mg at 07/25/17 2204          ALLERGY     Allergies   Allergen Reactions     Codeine Nausea Only     Morphine Nausea Only     Seroquel [Quetiapine]      Bactrim [Sulfamethoxazole-Trimethoprim] Rash     Rash all over the body        PHYSICAL EXAM   BP 98/67  Pulse (!) 117  Temp 98.1  F (36.7  C) (Oral)   Resp 20  Ht 5' 4\" (1.626 m)  Wt 170 lb (77.1 kg)  SpO2 96%  BMI 29.18 kg/m2     Vitals:    07/25/17 1445 07/25/17 1600 07/25/17 2208 07/26/17 0900   BP: 153/80 153/90 (!) 129/91 98/67   Patient Position: Sitting Sitting     Pulse: 97 87 98 (!) 117   Resp: 16 16 16 20   Temp: 98.4  F (36.9  C) 98.4  F (36.9  C) 97.7  F (36.5  C) 98.1  F (36.7  C)   TempSrc: Oral Oral Oral Oral   SpO2: 96% 96% 98% 96%   Weight:       Height:         General appearance - alert, well appearing, and in no distress and oriented to person, place, and time; gait intact; speaks fluently; no jaundice or scleral icterus    Mental Status - normal mood, behavior, speech, dress, motor activity, and thought processes, anxious;        IMPRESSION    Kavitha Headley is a 71 y.o. female who presented to [facility] with alcohol use disorder.     Patient Active Problem List   Diagnosis     Alcohol use disorder, severe, dependence     H/O gastric bypass     S/P total knee replacement         Reviewed risks and benefits of medications yes   Patient able to verbalize understanding of side effects yes   Patient (or legal guardian) verbally agrees to compliance with medication directions  yes        PLAN    1. In terms " of alcohol use disorder and insomnia, patient was started on baclofen 5 mg po tid. We discussed that this also may help with alcohol cravings. It is ok for patient to use ativan prn for insomnia tonight given recent d/c of temazepam, but we discussed that tomorrow this will be discontinued and we will work on transitioning her to nonsedative medications. Recommended she discuss CBT for insomnia with the therapist during her diagnostic assessment.  2. Continue to attend all relevant cd groups, and follow recommendations.  3. Dietary consult obtained, and recommended supplementing magnesium. This will be started today, along with recommended calcium, vitamin D and b12 supplements.  4. Diagnostic assessment ordered; consider psychiatry as needed.  5. Continue to follow as needed while in hospital.   6. Restarted patient's lasix per her request. Will recheck basic tomorrow to evaluate electrolytes with this medication.     Discharge planning: per cd team    Discussed care with patient.  Total time of care 20 minutes; >50 % spent in counseling face-to-face and coordination of care.         -------End of Report---------

## 2021-08-02 DIAGNOSIS — F10.21 ALCOHOL USE DISORDER, SEVERE, IN EARLY REMISSION (H): ICD-10-CM

## 2021-08-02 DIAGNOSIS — F51.04 CHRONIC INSOMNIA: ICD-10-CM

## 2021-08-02 RX ORDER — TRAZODONE HYDROCHLORIDE 100 MG/1
TABLET ORAL
Qty: 90 TABLET | Refills: 2 | Status: SHIPPED | OUTPATIENT
Start: 2021-08-02 | End: 2022-02-08

## 2021-08-04 RX ORDER — ACAMPROSATE CALCIUM 333 MG/1
TABLET, DELAYED RELEASE ORAL
Qty: 270 TABLET | Refills: 1 | Status: SHIPPED | OUTPATIENT
Start: 2021-08-04 | End: 2021-09-29

## 2021-08-04 NOTE — TELEPHONE ENCOUNTER
acamprosate (CAMPRAL) 333 MG EC tablet 270 tablet 1 5/18/2021  No   Sig - Route: Take 1 tablet (333 mg) by mouth 3 times daily - Oral   Sent to pharmacy as: Acamprosate Calcium 333 MG Oral Tablet Delayed Release (CAMPRAL       Last office visit: 5/12/2021 with prescribing provider:     Future Office Visit:          Encounter-Level CSA - 12/12/2016:    Controlled Substance Agreement - Scan on 12/16/2016  5:16 PM: CONTROLLED SUBSTANCE AGREEMENT     Patient-Level CSA:    There are no patient-level csa.           Routing refill request to provider for review/approval because:  Drug not on the [facility] refill protocol     Ryann Harrison RN, BSN  [facility]

## 2021-08-18 DIAGNOSIS — M62.838 MUSCLE SPASM: ICD-10-CM

## 2021-08-18 DIAGNOSIS — I10 BENIGN ESSENTIAL HYPERTENSION: ICD-10-CM

## 2021-08-18 DIAGNOSIS — F51.04 CHRONIC INSOMNIA: ICD-10-CM

## 2021-08-18 NOTE — TELEPHONE ENCOUNTER
Patient needs  Quetiapine 50 mg only- does not need the 100mg tabs  Methocarbamol  Losartan  Pravastatin  Hydroxyzine   Mirtazapine 15 mg 1 at bedtime     All filled and sent to [facility] for all       Essence Elizondo   .  .

## 2021-08-20 RX ORDER — QUETIAPINE FUMARATE 100 MG/1
100 TABLET, FILM COATED ORAL 2 TIMES DAILY PRN
Qty: 60 TABLET | Refills: 1 | Status: SHIPPED | OUTPATIENT
Start: 2021-08-20 | End: 2021-08-27

## 2021-08-20 RX ORDER — QUETIAPINE FUMARATE 50 MG/1
50 TABLET, FILM COATED ORAL
Qty: 30 TABLET | Refills: 8 | Status: SHIPPED | OUTPATIENT
Start: 2021-08-20 | End: 2021-09-29

## 2021-08-20 RX ORDER — LOSARTAN POTASSIUM 25 MG/1
25 TABLET ORAL EVERY MORNING
Qty: 30 TABLET | Refills: 1 | Status: SHIPPED | OUTPATIENT
Start: 2021-08-20 | End: 2021-10-29

## 2021-08-20 NOTE — TELEPHONE ENCOUNTER
Please review the robaxin refill request     Ryann Harrison RN, BSN  [facility]

## 2021-08-23 RX ORDER — METHOCARBAMOL 500 MG/1
TABLET, FILM COATED ORAL
Qty: 90 TABLET | Refills: 1 | Status: ON HOLD | OUTPATIENT
Start: 2021-08-23 | End: 2021-12-23

## 2021-08-27 DIAGNOSIS — F51.04 CHRONIC INSOMNIA: ICD-10-CM

## 2021-08-27 RX ORDER — QUETIAPINE FUMARATE 100 MG/1
100 TABLET, FILM COATED ORAL 2 TIMES DAILY PRN
Qty: 60 TABLET | Refills: 1 | Status: SHIPPED | OUTPATIENT
Start: 2021-08-27 | End: 2021-11-24

## 2021-09-03 DIAGNOSIS — F41.9 ANXIETY: ICD-10-CM

## 2021-09-03 DIAGNOSIS — F51.04 CHRONIC INSOMNIA: ICD-10-CM

## 2021-09-03 DIAGNOSIS — F32.0 MILD MAJOR DEPRESSION (H): ICD-10-CM

## 2021-09-07 RX ORDER — HYDROXYZINE HYDROCHLORIDE 25 MG/1
TABLET, FILM COATED ORAL
Qty: 90 TABLET | Refills: 3 | Status: ON HOLD | OUTPATIENT
Start: 2021-09-07 | End: 2021-12-23

## 2021-09-08 DIAGNOSIS — F51.04 CHRONIC INSOMNIA: ICD-10-CM

## 2021-09-10 NOTE — TELEPHONE ENCOUNTER
patient has 3 doses n chart.    Failed protocol.  please route to  team if patient needs an appointment     Essence ARTEAGARN BSN  [facility]  [phone]

## 2021-09-13 RX ORDER — QUETIAPINE 150 MG/1
TABLET, FILM COATED, EXTENDED RELEASE ORAL
Qty: 90 TABLET | Refills: 1 | Status: SHIPPED | OUTPATIENT
Start: 2021-09-13 | End: 2021-09-29

## 2021-09-29 ENCOUNTER — VIRTUAL VISIT (OUTPATIENT)
Dept: ADDICTION MEDICINE | Facility: CLINIC | Age: 76
End: 2021-09-29
Payer: COMMERCIAL

## 2021-09-29 DIAGNOSIS — F51.04 CHRONIC INSOMNIA: ICD-10-CM

## 2021-09-29 DIAGNOSIS — F10.20 ALCOHOL USE DISORDER, SEVERE, DEPENDENCE (H): Primary | ICD-10-CM

## 2021-09-29 DIAGNOSIS — F32.0 MILD MAJOR DEPRESSION (H): ICD-10-CM

## 2021-09-29 DIAGNOSIS — F10.21 ALCOHOL USE DISORDER, SEVERE, IN EARLY REMISSION (H): ICD-10-CM

## 2021-09-29 DIAGNOSIS — G89.4 CHRONIC PAIN SYNDROME: ICD-10-CM

## 2021-09-29 PROCEDURE — 99214 OFFICE O/P EST MOD 30 MIN: CPT | Mod: 95 | Performed by: FAMILY MEDICINE

## 2021-09-29 RX ORDER — CITALOPRAM HYDROBROMIDE 20 MG/1
20 TABLET ORAL
COMMUNITY
Start: 2021-04-16 | End: 2021-12-15

## 2021-09-29 RX ORDER — QUETIAPINE 150 MG/1
150 TABLET, FILM COATED, EXTENDED RELEASE ORAL AT BEDTIME
Qty: 90 TABLET | Refills: 1 | Status: SHIPPED | OUTPATIENT
Start: 2021-09-29 | End: 2021-12-09

## 2021-09-29 RX ORDER — MIRTAZAPINE 15 MG/1
15 TABLET, FILM COATED ORAL AT BEDTIME
Qty: 30 TABLET | Refills: 3 | Status: SHIPPED | OUTPATIENT
Start: 2021-09-29 | End: 2022-02-08

## 2021-09-29 RX ORDER — ACAMPROSATE CALCIUM 333 MG/1
666 TABLET, DELAYED RELEASE ORAL 3 TIMES DAILY
Qty: 180 TABLET | Refills: 3 | Status: SHIPPED | OUTPATIENT
Start: 2021-09-29 | End: 2022-02-08

## 2021-09-29 RX ORDER — QUETIAPINE FUMARATE 50 MG/1
50 TABLET, FILM COATED ORAL
Qty: 30 TABLET | Refills: 8 | Status: SHIPPED | OUTPATIENT
Start: 2021-09-29 | End: 2022-06-17

## 2021-09-29 NOTE — PROGRESS NOTES
Linda is a 75 year old who is being evaluated via a billable telephone visit.      What phone number would you like to be contacted at? [phone]  How would you like to obtain your AVS? Mail a copy

## 2021-09-29 NOTE — PROGRESS NOTES
[facility] Addiction Medicine    A/P                                                    ASSESSMENT/PLAN  Diagnoses and all orders for this visit:  Alcohol use disorder, severe, dependence (H)  Chronic insomnia  -     mirtazapine (REMERON) 15 MG tablet; Take 1 tablet (15 mg) by mouth At Bedtime  -     QUEtiapine (SEROQUEL) 50 MG tablet; Take 1 tablet (50 mg) by mouth nightly as needed (Anxiety, insomnia, hallucinations)  -     QUEtiapine (SEROQUEL XR) 150 MG TB24 24 hr tablet; Take 1 tablet (150 mg) by mouth At Bedtime  Chronic pain syndrome  -     diclofenac (VOLTAREN) 50 MG EC tablet; Take 1 tablet (50 mg) by mouth 2 times daily  Mild major depression (H)  -     mirtazapine (REMERON) 15 MG tablet; Take 1 tablet (15 mg) by mouth At Bedtime  Alcohol use disorder, severe, in early remission (H)  -     acamprosate (CAMPRAL) 333 MG EC tablet; Take 2 tablets (666 mg) by mouth 3 times daily    Orders Placed This Encounter   Medications     citalopram (CELEXA) 20 MG tablet     Sig: Take 20 mg by mouth     diclofenac (VOLTAREN) 50 MG EC tablet     Sig: Take 1 tablet (50 mg) by mouth 2 times daily     Dispense:  60 tablet     Refill:  1     mirtazapine (REMERON) 15 MG tablet     Sig: Take 1 tablet (15 mg) by mouth At Bedtime     Dispense:  30 tablet     Refill:  3     QUEtiapine (SEROQUEL) 50 MG tablet     Sig: Take 1 tablet (50 mg) by mouth nightly as needed (Anxiety, insomnia, hallucinations)     Dispense:  30 tablet     Refill:  8     QUEtiapine (SEROQUEL XR) 150 MG TB24 24 hr tablet     Sig: Take 1 tablet (150 mg) by mouth At Bedtime     Dispense:  90 tablet     Refill:  1     acamprosate (CAMPRAL) 333 MG EC tablet     Sig: Take 2 tablets (666 mg) by mouth 3 times daily     Dispense:  180 tablet     Refill:  3       Problem list updated Sep 29, 2021   No problems updated.      - Agree with restarting mirtazepine. Has taken this in conjuction with other SSRIs in the past; warned of risk of serotonin syndrome  and she indicated understanding. Low risk with previous history of this combination. Mirtazepine will likely be helpful through the winter, when her mood often dips  - Continue with seroquel; history of concern for SE, however her sleep has been chronically poor and, as we discussed risks including metabolic syndrome and tardive dyskinesia, we agreed that the benefits are appropriately outweighing these risks, especially as she continues to struggle with avoiding alcohol entirely, and insomnia contributes to her risk for alcohol use  - Will re-evaluate with each visit as appropriate  - Continue campral for craving relief  - Proivded trial of diclofenac; advised she work with her PCP and/or pain provider(s) to work on a plan for NSAID mgmt for chronic pain      PDMP Review       Value Time User    State PDMP site checked  Yes 9/29/2021  2:47 PM Jin Ackerman MD            RTC  Return in about 2 months (around 12/9/2021) for please call to schedule, video visit, phone visit.      Counseled the patient on the importance of having a recovery program in addition to medication to manage recovery.  Components include avoiding isolating, having willingness to change, avoiding triggers and managing cravings. Encouraged having some type of sober network and practicing honesty with trusted support person(s). Encouraged other services such as counseling, 12 step or other self-help organizations.          SUBJECTIVE                                                    Kavitha Headley is a 75 year old female who presents to clinic today for follow up    Visit performed Virtual, via telephone    Time spent on phone call: 25 minutes        TODAY'S VISIT  HPI Sep 29, 2021  - Suffers from chronic pain concerns. Was in [place] and tried a medication that is not approved for use in the [place]. Asking for an alternative if possible  - Ongoing cravings intermittently, not much better or worse, but the acamprosate is helpful to better  "tolerate these and avoid alcohol  - Reports \"a couple slips\" in recent months.  notes these are often precipitated by significant back pain  - Notes she was completely abstinent while in [place]. Returned to use after an episode of difficult communication with her son, which was disappointing  - Has used seroquel in the past for sleep. She stopped this in 2018 for fear of long-term side-effects, including tardive dyskinesia, which was mentioned        OBJECTIVE                                                    PHYSICAL EXAM:  There were no vitals taken for this visit.    GENERAL: healthy, alert and no distress  RESP: No respiratory distress  MENTAL STATUS EXAM  Appearance/Behavior: No appearant distress  Speech: Normal  Mood/Affect: normal affect  Insight: Adequate    LAB  No results found for any visits on 09/29/21.      HISTORY                                                    Problem list reviewed & adjusted, as indicated.  Patient Active Problem List   Diagnosis     Spinal stenosis     Chronic insomnia     CKD (chronic kidney disease) stage 3, GFR 30-59 ml/min (H)     Knee joint replacement status     Chronic pain syndrome     Bariatric surgery status     Personal history of urinary calculi     Anemia, unspecified type     Anxiety     Vitamin B12 deficiency (non anemic)     Alcohol use disorder, severe, dependence (H)     Liver disease, chronic, due to alcohol (H)     Coronary artery disease involving native coronary artery of native heart without angina pectoris     Mild ascending aorta dilatation (H)     Psoriasis - on Humira - Dr. Gauri Clark with dermatology     Mild major depression (H)     Thiamine deficiency     Herpes simplex virus infection     Cold sore     Gastroesophageal reflux disease with esophagitis - chronic due to alcohol     Alcohol withdrawal syndrome without complication (H)         MEDICATION LIST (prior to visit)  atenolol (TENORMIN) 25 MG tablet, TAKE 1 TABLET EVERY " MORNING  citalopram (CELEXA) 20 MG tablet, Take 20 mg by mouth  furosemide (LASIX) 20 MG tablet, Take 1 tablet (20 mg) by mouth daily TAKE 1 TABLET DAILY  gabapentin (NEURONTIN) 300 MG capsule, Take 1 capsule (300 mg) by mouth 3 times daily  hydrOXYzine (ATARAX) 25 MG tablet, TAKE 1 TABLET EVERY 6 HOURS AS NEEDED FOR ITCHING  losartan (COZAAR) 25 MG tablet, Take 1 tablet (25 mg) by mouth every morning  methocarbamol (ROBAXIN) 500 MG tablet, TAKE 1 TABLET BY MOUTH THREE TIMES A DAY  pravastatin (PRAVACHOL) 20 MG tablet, Take 1 tablet (20 mg) by mouth daily  QUEtiapine (SEROQUEL) 100 MG tablet, TAKE 1 TABLET (100 MG) BY MOUTH 2 TIMES DAILY AS NEEDED (INSOMNIA)  traZODone (DESYREL) 100 MG tablet, TAKE 1 TABLET NIGHTLY AS NEEDED FOR SLEEP  aspirin 81 MG EC tablet, Take 81 mg by mouth daily  folic acid (FOLVITE) 1 MG tablet, Take 1 mg by mouth daily  Multiple Vitamins-Minerals (CENTRUM SILVER) per tablet, Take 1 tablet by mouth daily  polyethylene glycol (MIRALAX) 17 g packet, Take 17 g by mouth 2 times daily  senna-docusate (SENOKOT-S/PERICOLACE) 8.6-50 MG tablet, Take 1 tablet by mouth 2 times daily  thiamine (B-1) 100 MG tablet, Take 1 tablet (100 mg) by mouth daily    No current facility-administered medications on file prior to visit.      MEDICATION LIST (after visit)  Current Outpatient Medications   Medication     acamprosate (CAMPRAL) 333 MG EC tablet     atenolol (TENORMIN) 25 MG tablet     citalopram (CELEXA) 20 MG tablet     diclofenac (VOLTAREN) 50 MG EC tablet     furosemide (LASIX) 20 MG tablet     gabapentin (NEURONTIN) 300 MG capsule     hydrOXYzine (ATARAX) 25 MG tablet     losartan (COZAAR) 25 MG tablet     methocarbamol (ROBAXIN) 500 MG tablet     mirtazapine (REMERON) 15 MG tablet     pravastatin (PRAVACHOL) 20 MG tablet     QUEtiapine (SEROQUEL XR) 150 MG TB24 24 hr tablet     QUEtiapine (SEROQUEL) 100 MG tablet     QUEtiapine (SEROQUEL) 50 MG tablet     traZODone (DESYREL) 100 MG tablet     aspirin  81 MG EC tablet     folic acid (FOLVITE) 1 MG tablet     Multiple Vitamins-Minerals (CENTRUM SILVER) per tablet     polyethylene glycol (MIRALAX) 17 g packet     senna-docusate (SENOKOT-S/PERICOLACE) 8.6-50 MG tablet     thiamine (B-1) 100 MG tablet     No current facility-administered medications for this visit.         Allergies   Allergen Reactions     Bactrim [Sulfamethoxazole W/Trimethoprim] Hives     Codeine Itching     NAUSEA     Morphine Itching     NAUSEA       Additional MDM Details:  none    Jin Ackerman MD  [facility] Addiction Medicine  [phone]

## 2021-10-19 PROBLEM — F32.9 MAJOR DEPRESSION: Status: ACTIVE | Noted: 2019-12-04

## 2021-10-20 ENCOUNTER — HOSPITAL ENCOUNTER (OUTPATIENT)
Dept: CARDIOLOGY | Facility: CLINIC | Age: 76
Discharge: HOME | End: 2021-10-20
Attending: INTERNAL MEDICINE | Admitting: INTERNAL MEDICINE
Payer: COMMERCIAL

## 2021-10-20 ENCOUNTER — TELEPHONE (OUTPATIENT)
Dept: CARDIOLOGY | Facility: CLINIC | Age: 76
End: 2021-10-20

## 2021-10-20 ENCOUNTER — LAB (OUTPATIENT)
Dept: LAB | Facility: CLINIC | Age: 76
End: 2021-10-20
Attending: INTERNAL MEDICINE
Payer: COMMERCIAL

## 2021-10-20 DIAGNOSIS — I35.0 NONRHEUMATIC AORTIC VALVE STENOSIS: Primary | ICD-10-CM

## 2021-10-20 DIAGNOSIS — I35.0 NONRHEUMATIC AORTIC VALVE STENOSIS: ICD-10-CM

## 2021-10-20 DIAGNOSIS — E78.00 HYPERCHOLESTEROLEMIA: ICD-10-CM

## 2021-10-20 DIAGNOSIS — F10.20 ALCOHOL USE DISORDER, SEVERE, DEPENDENCE (H): ICD-10-CM

## 2021-10-20 LAB
ALBUMIN SERPL-MCNC: 3.4 G/DL (ref 3.4–5)
ALP SERPL-CCNC: 80 U/L (ref 40–150)
ALT SERPL W P-5'-P-CCNC: 17 U/L (ref 0–50)
ANION GAP SERPL CALCULATED.3IONS-SCNC: 5 MMOL/L (ref 3–14)
AST SERPL W P-5'-P-CCNC: 19 U/L (ref 0–45)
BILIRUB SERPL-MCNC: 0.4 MG/DL (ref 0.2–1.3)
BUN SERPL-MCNC: 16 MG/DL (ref 7–30)
CALCIUM SERPL-MCNC: 8.9 MG/DL (ref 8.5–10.1)
CHLORIDE BLD-SCNC: 104 MMOL/L (ref 94–109)
CHOLEST SERPL-MCNC: 218 MG/DL
CO2 SERPL-SCNC: 28 MMOL/L (ref 20–32)
CREAT SERPL-MCNC: 1.09 MG/DL (ref 0.52–1.04)
FASTING STATUS PATIENT QL REPORTED: YES
GFR SERPL CREATININE-BSD FRML MDRD: 49 ML/MIN/1.73M2
GLUCOSE BLD-MCNC: 108 MG/DL (ref 70–99)
HDLC SERPL-MCNC: 86 MG/DL
LDLC SERPL CALC-MCNC: 85 MG/DL
LVEF ECHO: NORMAL
NONHDLC SERPL-MCNC: 132 MG/DL
POTASSIUM BLD-SCNC: 4.4 MMOL/L (ref 3.4–5.3)
PROT SERPL-MCNC: 7.7 G/DL (ref 6.8–8.8)
SODIUM SERPL-SCNC: 137 MMOL/L (ref 133–144)
TRIGL SERPL-MCNC: 234 MG/DL

## 2021-10-20 PROCEDURE — 93306 TTE W/DOPPLER COMPLETE: CPT | Mod: 26 | Performed by: INTERNAL MEDICINE

## 2021-10-20 PROCEDURE — 93306 TTE W/DOPPLER COMPLETE: CPT

## 2021-10-20 PROCEDURE — 36415 COLL VENOUS BLD VENIPUNCTURE: CPT

## 2021-10-20 PROCEDURE — 80061 LIPID PANEL: CPT

## 2021-10-20 PROCEDURE — 80053 COMPREHEN METABOLIC PANEL: CPT

## 2021-10-20 NOTE — TELEPHONE ENCOUNTER
CMP and FLP labs  and Echo done 10-20-21  Sodium 133 - 144 mmol/L 137      Potassium 3.4 - 5.3 mmol/L 4.4     Chloride 94 - 109 mmol/L 104     Carbon Dioxide (CO2) 20 - 32 mmol/L 28     Anion Gap 3 - 14 mmol/L 5     Urea Nitrogen 7 - 30 mg/dL 16     Creatinine 0.52 - 1.04 mg/dL 1.09High      Calcium 8.5 - 10.1 mg/dL 8.9     Glucose 70 - 99 mg/dL 108High      Alkaline Phosphatase 40 - 150 U/L 80     AST 0 - 45 U/L 19     ALT 0 - 50 U/L 17     Protein Total 6.8 - 8.8 g/dL 7.7     Albumin 3.4 - 5.0 g/dL 3.4     Bilirubin Total 0.2 - 1.3 mg/dL 0.4     GFR Estimate >60 mL/min/1.73m2 49Low       FLP, 10-20-21 Chol 218, , LDL 85, .     Echo 10-20-21 - Left ventricular systolic function is normal.  The visual ejection fraction is 60-65%.  Diastolic Doppler findings (E/E' ratio and/or other parameters) suggest left  ventricular filling pressures are increased.  Moderate to severe valvular aortic stenosis.  mean 32mmHg peak 61mmHg, RENE 1.0cm2, DI 0.30 Compared to prior study, there is  no significant change.

## 2021-10-26 NOTE — TELEPHONE ENCOUNTER
Herman Ugarte MD Theis, Marcie J, RN  Cc: CAROL Correia [facility] Heart Team 2  Caller: Unspecified (6 days ago,  2:41 PM)    Please schedule clinic visit with me.  If no slots with me, to see TARUN to review results.  Thanks.       Left message for patient to call back to review echo/labs and message from Dr Ugarte. Order placed for visit.

## 2021-10-27 NOTE — TELEPHONE ENCOUNTER
Attempted to contact patient to discuss Dr. Ugarte's request to see patient and discuss recent test results. Left message for patient that echo showed no new changes but Dr. Ugarte would like to visit with her about future planning.  Holding spot on 11/24/2021 @ 10:45 for patient - awaiting call back to confirm this appointment with Dr. Ugarte.    10/28/2021 spoke with patient and she can keep the appointment on 11/24/2021.

## 2021-10-28 DIAGNOSIS — I10 BENIGN ESSENTIAL HYPERTENSION: ICD-10-CM

## 2021-10-29 RX ORDER — LOSARTAN POTASSIUM 25 MG/1
TABLET ORAL
Qty: 90 TABLET | Refills: 1 | Status: SHIPPED | OUTPATIENT
Start: 2021-10-29 | End: 2021-11-24

## 2021-11-24 ENCOUNTER — OFFICE VISIT (OUTPATIENT)
Dept: CARDIOLOGY | Facility: CLINIC | Age: 76
End: 2021-11-24
Payer: COMMERCIAL

## 2021-11-24 VITALS
WEIGHT: 188 LBS | OXYGEN SATURATION: 100 % | SYSTOLIC BLOOD PRESSURE: 113 MMHG | HEIGHT: 64 IN | DIASTOLIC BLOOD PRESSURE: 61 MMHG | HEART RATE: 61 BPM | BODY MASS INDEX: 32.1 KG/M2

## 2021-11-24 DIAGNOSIS — F10.20 ALCOHOL USE DISORDER, SEVERE, DEPENDENCE (H): ICD-10-CM

## 2021-11-24 DIAGNOSIS — I35.0 MODERATE AORTIC VALVE STENOSIS: Primary | ICD-10-CM

## 2021-11-24 DIAGNOSIS — E78.5 HYPERLIPIDEMIA LDL GOAL <100: ICD-10-CM

## 2021-11-24 DIAGNOSIS — I10 BENIGN ESSENTIAL HYPERTENSION: ICD-10-CM

## 2021-11-24 PROBLEM — F10.930 ALCOHOL WITHDRAWAL SYNDROME WITHOUT COMPLICATION (H): Status: RESOLVED | Noted: 2021-03-23 | Resolved: 2021-11-24

## 2021-11-24 PROCEDURE — 99214 OFFICE O/P EST MOD 30 MIN: CPT | Performed by: INTERNAL MEDICINE

## 2021-11-24 RX ORDER — PRAVASTATIN SODIUM 20 MG
20 TABLET ORAL DAILY
Qty: 100 TABLET | Refills: 4 | COMMUNITY
Start: 2021-11-24 | End: 2022-02-08

## 2021-11-24 RX ORDER — LOSARTAN POTASSIUM 25 MG/1
25 TABLET ORAL DAILY
Qty: 100 TABLET | Refills: 3 | Status: ON HOLD | COMMUNITY
Start: 2021-11-24 | End: 2021-12-27

## 2021-11-24 RX ORDER — ATENOLOL 25 MG/1
25 TABLET ORAL DAILY
Qty: 100 TABLET | Refills: 3 | COMMUNITY
Start: 2021-11-24 | End: 2022-06-10 | Stop reason: ALTCHOICE

## 2021-11-24 ASSESSMENT — MIFFLIN-ST. JEOR: SCORE: 1317.76

## 2021-11-24 NOTE — LETTER
11/24/2021       RE: Kavitha Headley  [address] [facility]  [place] [address]     Dear Colleague,    Thank you for referring your patient, Kavitha Headley, to the [facility] at [facility]. Please see a copy of my visit note below.    Clinic visit note dictated. Dictation reference number - [id number]      Today's clinic visit entailed:  The following tests were independently interpreted by me as noted in my documentation: Labs, echocardiogram.  Ordering of each unique test  Prescription drug management  30 minutes spent on the date of the encounter doing chart review, history and exam, documentation and further activities per the note  Provider  Link to MDM Help Grid     The level of medical decision making during this visit was of moderate complexity.          CURRENT MEDICATIONS:  Current Outpatient Medications   Medication Sig Dispense Refill     acamprosate (CAMPRAL) 333 MG EC tablet Take 2 tablets (666 mg) by mouth 3 times daily 180 tablet 3     aspirin 81 MG EC tablet Take 81 mg by mouth daily       atenolol (TENORMIN) 25 MG tablet Take 1 tablet (25 mg) by mouth daily 100 tablet 3     citalopram (CELEXA) 20 MG tablet Take 20 mg by mouth       diclofenac (VOLTAREN) 50 MG EC tablet Take 1 tablet (50 mg) by mouth 2 times daily 60 tablet 1     folic acid (FOLVITE) 1 MG tablet Take 1 mg by mouth daily       furosemide (LASIX) 20 MG tablet TAKE 1 TABLET DAILY 100 tablet 4     gabapentin (NEURONTIN) 300 MG capsule Take 1 capsule (300 mg) by mouth 3 times daily 270 capsule 1     hydrOXYzine (ATARAX) 25 MG tablet TAKE 1 TABLET EVERY 6 HOURS AS NEEDED FOR ITCHING 90 tablet 3     losartan (COZAAR) 25 MG tablet Take 1 tablet (25 mg) by mouth daily 100 tablet 3     methocarbamol (ROBAXIN) 500 MG tablet TAKE 1 TABLET BY MOUTH THREE TIMES A DAY 90 tablet 1     mirtazapine (REMERON) 15 MG tablet Take 1 tablet (15 mg) by mouth At Bedtime 30 tablet  3     Multiple Vitamins-Minerals (CENTRUM SILVER) per tablet Take 1 tablet by mouth daily       polyethylene glycol (MIRALAX) 17 g packet Take 17 g by mouth 2 times daily       pravastatin (PRAVACHOL) 20 MG tablet Take 1 tablet (20 mg) by mouth daily 100 tablet 4     QUEtiapine (SEROQUEL XR) 150 MG TB24 24 hr tablet Take 1 tablet (150 mg) by mouth At Bedtime 90 tablet 1     QUEtiapine (SEROQUEL) 50 MG tablet Take 1 tablet (50 mg) by mouth nightly as needed (Anxiety, insomnia, hallucinations) 30 tablet 8     senna-docusate (SENOKOT-S/PERICOLACE) 8.6-50 MG tablet Take 1 tablet by mouth 2 times daily       thiamine (B-1) 100 MG tablet Take 1 tablet (100 mg) by mouth daily       traZODone (DESYREL) 100 MG tablet TAKE 1 TABLET NIGHTLY AS NEEDED FOR SLEEP 90 tablet 2         ALLERGIES:  Allergies   Allergen Reactions     Bactrim [Sulfamethoxazole W/Trimethoprim] Hives     Codeine Itching     NAUSEA     Morphine Itching     NAUSEA       PAST MEDICAL HISTORY:    Past Medical History:   Diagnosis Date     Alcohol abuse     Long term alcohol abuse. Abstinenet since October 2019.     Anxiety disorder      Ascending aorta dilatation (H)      Bariatric surgery status 1996?    gastric bypass, [facility] and     Benign hypertension      Chronic insomnia      Chronic pain syndrome     Chronic back and neck pain, chronic pain due to osteoarthritis multiple joints     Coronary artery disease involving native coronary artery of native heart without angina pectoris 10/16/2018    Minimal coronary artery disease on coronary angiogram in 2015.      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     Major depressive disorder, single episode, severe, without mention of psychotic behavior      Mixed  hyperlipidemia      Moderate aortic stenosis 05/2014    moderate to severe aortic valve stenosis     Pelvic relaxation disorder     Surgical intervention for cystocele/rectocele 3,11/2012     Personal history of urinary calculi 6/2006    left ureteral stone,lithotripsy     Psoriasis      PVC (premature ventricular contraction)      Spinal stenosis      Stage III chronic kidney disease (H) 2005     SVT (supraventricular tachycardia) (H)     likely atrial tachycardia       PAST SURGICAL HISTORY:    Past Surgical History:   Procedure Laterality Date     APPENDECTOMY  3/2004    incidental     ARTHRODESIS TOE(S) Right 1/31/2020    Procedure: RIGHT FIRST METATARSAL PHALANGEAL JOINT ARTHRODESIS;  Surgeon: Steven Reyes MD;  Location:  OR     C GASTRIC BYPASS,OBESE<100CM ARIANNA-EN-Y  1996     C MEDIASTINOSCOPY W OR WO BIOPSY  2/2008    Videomediastinoscopy and, for mediastinal adenopathy -reactive lymphoid hyperplasia     C REPAIR OF RECTOCELE  3/2012     C TOTAL KNEE ARTHROPLASTY  12/2005    left      CARPAL TUNNEL RELEASE RT/LT  10/2010    Carpometacarpal excisional arthroplasty with a fascial autograft and APL suspension sling (20463). 2. Left thumb metacarpophalangeal joint fusion with autologous bone graft (25175). 3. Left endoscopic carpal tunnel release      CHOLECYSTECTOMY, LAPOROSCOPIC  11/2010    Cholecystectomy, Laparoscopic     COLONOSCOPY N/A 9/8/2016    Procedure: COMBINED COLONOSCOPY, SINGLE OR MULTIPLE BIOPSY/POLYPECTOMY BY BIOPSY;  Surgeon: Moe Barlow MD;  Location:  GI     CYSTOCELE REPAIR  11/2012    [facility] laparoscopic sacrocolpopexy, enterocele repair, lysis of adhesions, placement of retropubic mid urethral sling, cystoscopy     CYSTOSCOPY, LITHOTRIPSY, COMBINED  6/2006    Left extracorporeal shock wave lithotripsy, cystoscopy, left ureteral stent placement.     CYSTOSCOPY, REMOVE STENT(S), COMBINED  7/2006    Cystoscopy, removal of left ureteral stent, retrograde pyelography, flexible and  rigid ureteroscopy and holmium laser lithotripsy, basket removal of stone fragments, ureteral stent placement.      ENDOSCOPIC ULTRASOUND UPPER GASTROINTESTINAL TRACT (GI) N/A 6/12/2017    Procedure: ENDOSCOPIC ULTRASOUND, ESOPHAGOSCOPY / UPPER GASTROINTESTINAL TRACT (GI);  ENDOSCOPIC ULTRASOUND, ESOPHAGOSCOPY / UPPER GASTROINTESTINAL TRACT (GI);  Surgeon: Parth Graham MD;  Location:  GI     HERNIA REPAIR  4/2012    bilateral augmentation mastopexy, ventral hernia repair, and medial thigh liposuction on 04/06/2012.      HYSTERECTOMY VAGINAL, BILATERAL SALPINGO-OOPHERECTOMY, COMBINED  1998    due to myoma and bleeding     JOINT REPLACEMENT, HIP RT/LT  4/2004    right total hip arthroplasty     LAPAROTOMY, LYSIS ADHESIONS, COMBINED  3/2004    lysis adhesions, ventral hernia repair, appendectomy incidentally     LYMPH NODE BIOPSY  4/2008    right axillary, reactive follicular and paracortical hyperplasia.     MAMMOPLASTY AUGMENTATION BILATERAL  4/2012     REPAIR HAMMER TOE Right 1/31/2020    Procedure: WITH SECOND AND THIRD CLAW TOE RECONSTRUCTION;  Surgeon: Steven Reyes MD;  Location:  OR     REVISE RECONSTRUCTED BREAST  6/7/2012    Left breast capsulotomy.        FAMILY HISTORY:    Family History   Problem Relation Age of Onset     Substance Abuse Father      Cancer Father         throat and lung mets     Diabetes No family hx of      Coronary Artery Disease No family hx of      Cerebrovascular Disease No family hx of        SOCIAL HISTORY:    Social History     Socioeconomic History     Marital status:      Spouse name: Mt     Number of children: 4     Years of education: 18     Highest education level: None   Occupational History     Occupation: nurse     Employer: [employer]     Employer: RETIRED   Tobacco Use     Smoking status: Never Smoker     Smokeless tobacco: Never Used   Substance and Sexual Activity     Alcohol use: Not Currently     Alcohol/week: 63.0 standard drinks     Types:  "63 Standard drinks or equivalent per week     Comment: March 23rd Sobriety date     Drug use: No     Sexual activity: Yes     Partners: Male   Other Topics Concern      Service Not Asked     Blood Transfusions No     Caffeine Concern Yes     Comment: 1-2 cups per day      Occupational Exposure Yes     Comment: blood     Hobby Hazards No     Sleep Concern Yes     Stress Concern Yes     Weight Concern Yes     Comment: gastric  byepass     Special Diet No     Back Care No     Exercise Yes     Comment: walk, swin     Bike Helmet No     Seat Belt Yes     Self-Exams Yes     Parent/sibling w/ CABG, MI or angioplasty before 65F 55M? Not Asked   Social History Narrative     None     Social Determinants of Health     Financial Resource Strain: Not on file   Food Insecurity: Not on file   Transportation Needs: Not on file   Physical Activity: Not on file   Stress: Not on file   Social Connections: Not on file   Intimate Partner Violence: Not on file   Housing Stability: Not on file       PHYSICAL EXAM:    Vitals: /61   Pulse 61   Ht 1.626 m (5' 4\")   Wt 85.3 kg (188 lb)   SpO2 100%   BMI 32.27 kg/m    Wt Readings from Last 5 Encounters:   11/24/21 85.3 kg (188 lb)   06/25/21 78.9 kg (174 lb)   05/12/21 79.8 kg (176 lb)   03/24/21 77.2 kg (170 lb 3.2 oz)   03/16/21 79.4 kg (175 lb)         Encounter Diagnoses   Name Primary?     Moderate aortic valve stenosis Yes     Benign essential hypertension      Hyperlipidemia LDL goal <100      Alcohol use disorder, severe, dependence (H)        Orders Placed This Encounter   Procedures     Follow-Up with Cardiologist     Echocardiogram Complete         Service Date: 11/24/2021    PRIMARY CARE PROVIDER:  Nayeli Castorena PA-C    REASON FOR VISIT:  Six-monthly followup of moderately severe aortic valve stenosis, hypertension, hyperlipidemia.    HISTORY OF PRESENT ILLNESS:    It was my pleasure to follow up with Linda.  She is a delightful and beautifully dressed 76-year-old " " lady, has a significant other, 4 children (one son lives in [place], the other children live in [facility]).  She has had a lot of trials and tribulations through her life and despite that, has a very kind and compassionate personality.    Linda's history is significant for:  1.  Moderately severe aortic valve stenosis, asymptomatic, under six-monthly surveillance.  2.  Stable mild ascending aorta dilatation of 4.1 cm with normal aortic root.  Confirmed on CT angiogram.  3.  Treated hypertension.  4.  Treated hyperlipidemia.  5.  BMI 32 kg/m2.  Status post previous gastric bypass surgery.  6.  Long history of alcohol dependency with multiple rehabilitation interventions, most recently in 03/2021.  7.  Never-tobacco user.    Overall, Linda has been stable.  She has had a few \"slip ups\" with alcohol but has not needed any hospitalizations.  She is trying to stay on top of this.  Her male partner has also recovered from alcohol dependency and she reports him as being very supportive.  She has close family connections.    She has gained about 12 pounds in weight over the last year due to increased calorific intake.  She denies any symptoms of chest pain, worsening dyspnea, palpitations or dizziness.    I personally reviewed her echocardiogram images and it is largely unchanged from previous.  Normal LV systolic function with an LVEF of 60%-65%.  The aortic valve is calcified, peak transaortic velocity 3.3 m/sec, valve area 1.0 cm2, mean gradient 32-33 mmHg, velocity index is 0.3.    After starting pravastatin, her LDL has come down from 145 to 85.  Due to increased calorie intake and weight gain, not surprisingly, her triglycerides have gone up to 234.  Creatinine is stable at 1.0.  Chronic anemia with a hemoglobin of 10.7.    PHYSICAL EXAMINATION:    GENERAL:  She is a beautifully dressed, elevated BMI, walks independently.  VITAL SIGNS:  /60, pulse 60 per minute, BMI 32 kg/m2.  CARDIOVASCULAR:  Her " heart sounds are audible and she has a late-peaking ejection systolic murmur that radiates to both carotids.    DIAGNOSES:    1.  Moderately severe aortic valve stenosis, asymptomatic.  2.  Other comorbidities listed above.    ASSESSMENT:    Aortic valve hemodynamics are stable.  On clinical exam, her aortic valve stenosis is moderately severe.  I suspect she will require valve intervention in the next year or so.  I have encouraged her to be more physically active to alert us to the onset of symptoms.    PLAN:    1.  Cardiac prescriptions renewed.  2.  Follow up with me in 6 months with pre-visit transthoracic echocardiogram.      Herman Ugarte MD        D: 2021   T: 2021   MT: jurgen    Name:     DARWIN JASSO  MRN:      [id number]        Account:      [id number]   :      1945           Service Date: 2021     Document: [id number]

## 2021-11-24 NOTE — PROGRESS NOTES
"Service Date: 11/24/2021    PRIMARY CARE PROVIDER:  Nayeli Castorena PA-C    REASON FOR VISIT:  Six-monthly followup of moderately severe aortic valve stenosis, hypertension, hyperlipidemia.    HISTORY OF PRESENT ILLNESS:    It was my pleasure to follow up with Linda.  She is a delightful and beautifully dressed 76-year-old  lady, has a significant other, 4 children (one son lives in [place], the other children live in [facility]).  She has had a lot of trials and tribulations through her life and despite that, has a very kind and compassionate personality.    Linda's history is significant for:  1.  Moderately severe aortic valve stenosis, asymptomatic, under six-monthly surveillance.  2.  Stable mild ascending aorta dilatation of 4.1 cm with normal aortic root.  Confirmed on CT angiogram.  3.  Treated hypertension.  4.  Treated hyperlipidemia.  5.  BMI 32 kg/m2.  Status post previous gastric bypass surgery.  6.  Long history of alcohol dependency with multiple rehabilitation interventions, most recently in 03/2021.  7.  Never-tobacco user.    Overall, Linda has been stable.  She has had a few \"slip ups\" with alcohol but has not needed any hospitalizations.  She is trying to stay on top of this.  Her male partner has also recovered from alcohol dependency and she reports him as being very supportive.  She has close family connections.    She has gained about 12 pounds in weight over the last year due to increased calorific intake.  She denies any symptoms of chest pain, worsening dyspnea, palpitations or dizziness.    I personally reviewed her echocardiogram images and it is largely unchanged from previous.  Normal LV systolic function with an LVEF of 60%-65%.  The aortic valve is calcified, peak transaortic velocity 3.3 m/sec, valve area 1.0 cm2, mean gradient 32-33 mmHg, velocity index is 0.3.    After starting pravastatin, her LDL has come down from 145 to 85.  Due to increased calorie intake and weight gain, " not surprisingly, her triglycerides have gone up to 234.  Creatinine is stable at 1.0.  Chronic anemia with a hemoglobin of 10.7.    PHYSICAL EXAMINATION:    GENERAL:  She is a beautifully dressed, elevated BMI, walks independently.  VITAL SIGNS:  /60, pulse 60 per minute, BMI 32 kg/m2.  CARDIOVASCULAR:  Her heart sounds are audible and she has a late-peaking ejection systolic murmur that radiates to both carotids.    DIAGNOSES:    1.  Moderately severe aortic valve stenosis, asymptomatic.  2.  Other comorbidities listed above.    ASSESSMENT:    Aortic valve hemodynamics are stable.  On clinical exam, her aortic valve stenosis is moderately severe.  I suspect she will require valve intervention in the next year or so.  I have encouraged her to be more physically active to alert us to the onset of symptoms.    PLAN:    1.  Cardiac prescriptions renewed.  2.  Follow up with me in 6 months with pre-visit transthoracic echocardiogram.    Herman Ugarte MD        D: 2021   T: 2021   MT: jurgen    Name:     DARWIN JASSO  MRN:      [id number]        Account:      [id number]   :      1945           Service Date: 2021       Document: [id number]

## 2021-11-24 NOTE — PROGRESS NOTES
Clinic visit note dictated. Dictation reference number - [id number]      Today's clinic visit entailed:  The following tests were independently interpreted by me as noted in my documentation: Labs, echocardiogram.  Ordering of each unique test  Prescription drug management  30 minutes spent on the date of the encounter doing chart review, history and exam, documentation and further activities per the note  Provider  Link to [facility] Help Grid     The level of medical decision making during this visit was of moderate complexity.          CURRENT MEDICATIONS:  Current Outpatient Medications   Medication Sig Dispense Refill     acamprosate (CAMPRAL) 333 MG EC tablet Take 2 tablets (666 mg) by mouth 3 times daily 180 tablet 3     aspirin 81 MG EC tablet Take 81 mg by mouth daily       atenolol (TENORMIN) 25 MG tablet Take 1 tablet (25 mg) by mouth daily 100 tablet 3     citalopram (CELEXA) 20 MG tablet Take 20 mg by mouth       diclofenac (VOLTAREN) 50 MG EC tablet Take 1 tablet (50 mg) by mouth 2 times daily 60 tablet 1     folic acid (FOLVITE) 1 MG tablet Take 1 mg by mouth daily       furosemide (LASIX) 20 MG tablet TAKE 1 TABLET DAILY 100 tablet 4     gabapentin (NEURONTIN) 300 MG capsule Take 1 capsule (300 mg) by mouth 3 times daily 270 capsule 1     hydrOXYzine (ATARAX) 25 MG tablet TAKE 1 TABLET EVERY 6 HOURS AS NEEDED FOR ITCHING 90 tablet 3     losartan (COZAAR) 25 MG tablet Take 1 tablet (25 mg) by mouth daily 100 tablet 3     methocarbamol (ROBAXIN) 500 MG tablet TAKE 1 TABLET BY MOUTH THREE TIMES A DAY 90 tablet 1     mirtazapine (REMERON) 15 MG tablet Take 1 tablet (15 mg) by mouth At Bedtime 30 tablet 3     Multiple Vitamins-Minerals (CENTRUM SILVER) per tablet Take 1 tablet by mouth daily       polyethylene glycol (MIRALAX) 17 g packet Take 17 g by mouth 2 times daily       pravastatin (PRAVACHOL) 20 MG tablet Take 1 tablet (20 mg) by mouth daily 100 tablet 4     QUEtiapine (SEROQUEL XR) 150 MG TB24 24 hr  tablet Take 1 tablet (150 mg) by mouth At Bedtime 90 tablet 1     QUEtiapine (SEROQUEL) 50 MG tablet Take 1 tablet (50 mg) by mouth nightly as needed (Anxiety, insomnia, hallucinations) 30 tablet 8     senna-docusate (SENOKOT-S/PERICOLACE) 8.6-50 MG tablet Take 1 tablet by mouth 2 times daily       thiamine (B-1) 100 MG tablet Take 1 tablet (100 mg) by mouth daily       traZODone (DESYREL) 100 MG tablet TAKE 1 TABLET NIGHTLY AS NEEDED FOR SLEEP 90 tablet 2         ALLERGIES:  Allergies   Allergen Reactions     Bactrim [Sulfamethoxazole W/Trimethoprim] Hives     Codeine Itching     NAUSEA     Morphine Itching     NAUSEA       PAST MEDICAL HISTORY:    Past Medical History:   Diagnosis Date     Alcohol abuse     Long term alcohol abuse. Abstinenet since October 2019.     Anxiety disorder      Ascending aorta dilatation (H)      Bariatric surgery status 1996?    gastric bypass, [employer] [facility] [employer] and     Benign hypertension      Chronic insomnia      Chronic pain syndrome     Chronic back and neck pain, chronic pain due to osteoarthritis multiple joints     Coronary artery disease involving native coronary artery of native heart without angina pectoris 10/16/2018    Minimal coronary artery disease on coronary angiogram in 2015.      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     Major depressive disorder, single episode, severe, without mention of psychotic behavior      Mixed hyperlipidemia      Moderate aortic stenosis 05/2014    moderate to severe aortic valve stenosis     Pelvic relaxation disorder     Surgical intervention for cystocele/rectocele 3,11/2012     Personal history of urinary calculi 6/2006    left ureteral stone,lithotripsy     Psoriasis      PVC (premature  ventricular contraction)      Spinal stenosis      Stage III chronic kidney disease (H) 2005     SVT (supraventricular tachycardia) (H)     likely atrial tachycardia       PAST SURGICAL HISTORY:    Past Surgical History:   Procedure Laterality Date     APPENDECTOMY  3/2004    incidental     ARTHRODESIS TOE(S) Right 1/31/2020    Procedure: RIGHT FIRST METATARSAL PHALANGEAL JOINT ARTHRODESIS;  Surgeon: Steven Reyes MD;  Location:  OR     C GASTRIC BYPASS,OBESE<100CM ARIANNA-EN-Y  1996     C MEDIASTINOSCOPY W OR WO BIOPSY  2/2008    Videomediastinoscopy and, for mediastinal adenopathy -reactive lymphoid hyperplasia     C REPAIR OF RECTOCELE  3/2012     C TOTAL KNEE ARTHROPLASTY  12/2005    left      CARPAL TUNNEL RELEASE RT/LT  10/2010    Carpometacarpal excisional arthroplasty with a fascial autograft and APL suspension sling (93662). 2. Left thumb metacarpophalangeal joint fusion with autologous bone graft (88302). 3. Left endoscopic carpal tunnel release      CHOLECYSTECTOMY, LAPOROSCOPIC  11/2010    Cholecystectomy, Laparoscopic     COLONOSCOPY N/A 9/8/2016    Procedure: COMBINED COLONOSCOPY, SINGLE OR MULTIPLE BIOPSY/POLYPECTOMY BY BIOPSY;  Surgeon: Moe Barlow MD;  Location:  GI     CYSTOCELE REPAIR  11/2012    [facility] laparoscopic sacrocolpopexy, enterocele repair, lysis of adhesions, placement of retropubic mid urethral sling, cystoscopy     CYSTOSCOPY, LITHOTRIPSY, COMBINED  6/2006    Left extracorporeal shock wave lithotripsy, cystoscopy, left ureteral stent placement.     CYSTOSCOPY, REMOVE STENT(S), COMBINED  7/2006    Cystoscopy, removal of left ureteral stent, retrograde pyelography, flexible and rigid ureteroscopy and holmium laser lithotripsy, basket removal of stone fragments, ureteral stent placement.      ENDOSCOPIC ULTRASOUND UPPER GASTROINTESTINAL TRACT (GI) N/A 6/12/2017    Procedure: ENDOSCOPIC ULTRASOUND, ESOPHAGOSCOPY / UPPER GASTROINTESTINAL TRACT (GI);  ENDOSCOPIC ULTRASOUND,  ESOPHAGOSCOPY / UPPER GASTROINTESTINAL TRACT (GI);  Surgeon: Parth Graham MD;  Location:  GI     HERNIA REPAIR  4/2012    bilateral augmentation mastopexy, ventral hernia repair, and medial thigh liposuction on 04/06/2012.      HYSTERECTOMY VAGINAL, BILATERAL SALPINGO-OOPHERECTOMY, COMBINED  1998    due to myoma and bleeding     JOINT REPLACEMENT, HIP RT/LT  4/2004    right total hip arthroplasty     LAPAROTOMY, LYSIS ADHESIONS, COMBINED  3/2004    lysis adhesions, ventral hernia repair, appendectomy incidentally     LYMPH NODE BIOPSY  4/2008    right axillary, reactive follicular and paracortical hyperplasia.     MAMMOPLASTY AUGMENTATION BILATERAL  4/2012     REPAIR HAMMER TOE Right 1/31/2020    Procedure: WITH SECOND AND THIRD CLAW TOE RECONSTRUCTION;  Surgeon: Steven Reyes MD;  Location:  OR     REVISE RECONSTRUCTED BREAST  6/7/2012    Left breast capsulotomy.        FAMILY HISTORY:    Family History   Problem Relation Age of Onset     Substance Abuse Father      Cancer Father         throat and lung mets     Diabetes No family hx of      Coronary Artery Disease No family hx of      Cerebrovascular Disease No family hx of        SOCIAL HISTORY:    Social History     Socioeconomic History     Marital status:      Spouse name: Mt     Number of children: 4     Years of education: 18     Highest education level: None   Occupational History     Occupation: nurse     Employer: [employer]     Employer: RETIRED   Tobacco Use     Smoking status: Never Smoker     Smokeless tobacco: Never Used   Substance and Sexual Activity     Alcohol use: Not Currently     Alcohol/week: 63.0 standard drinks     Types: 63 Standard drinks or equivalent per week     Comment: March 23rd Sobriety date     Drug use: No     Sexual activity: Yes     Partners: Male   Other Topics Concern      Service Not Asked     Blood Transfusions No     Caffeine Concern Yes     Comment: 1-2 cups per day      Occupational  "Exposure Yes     Comment: blood     Hobby Hazards No     Sleep Concern Yes     Stress Concern Yes     Weight Concern Yes     Comment: gastric  byepass     Special Diet No     Back Care No     Exercise Yes     Comment: walk, swin     Bike Helmet No     Seat Belt Yes     Self-Exams Yes     Parent/sibling w/ CABG, MI or angioplasty before 65F 55M? Not Asked   Social History Narrative     None     Social Determinants of Health     Financial Resource Strain: Not on file   Food Insecurity: Not on file   Transportation Needs: Not on file   Physical Activity: Not on file   Stress: Not on file   Social Connections: Not on file   Intimate Partner Violence: Not on file   Housing Stability: Not on file       PHYSICAL EXAM:    Vitals: /61   Pulse 61   Ht 1.626 m (5' 4\")   Wt 85.3 kg (188 lb)   SpO2 100%   BMI 32.27 kg/m    Wt Readings from Last 5 Encounters:   11/24/21 85.3 kg (188 lb)   06/25/21 78.9 kg (174 lb)   05/12/21 79.8 kg (176 lb)   03/24/21 77.2 kg (170 lb 3.2 oz)   03/16/21 79.4 kg (175 lb)         Encounter Diagnoses   Name Primary?     Moderate aortic valve stenosis Yes     Benign essential hypertension      Hyperlipidemia LDL goal <100      Alcohol use disorder, severe, dependence (H)        Orders Placed This Encounter   Procedures     Follow-Up with Cardiologist     Echocardiogram Complete       "

## 2021-11-24 NOTE — LETTER
11/24/2021    Nayeli Castorena PA-C  [address] [address]    RE: Kavitha Headley       Dear Colleague,    I had the pleasure of seeing Kavitha Headley in the [facility].    Clinic visit note dictated. Dictation reference number - [id number]      Today's clinic visit entailed:  The following tests were independently interpreted by me as noted in my documentation: Labs, echocardiogram.  Ordering of each unique test  Prescription drug management  30 minutes spent on the date of the encounter doing chart review, history and exam, documentation and further activities per the note  Provider  Link to [facility] Help Grid     The level of medical decision making during this visit was of moderate complexity.          CURRENT MEDICATIONS:  Current Outpatient Medications   Medication Sig Dispense Refill     acamprosate (CAMPRAL) 333 MG EC tablet Take 2 tablets (666 mg) by mouth 3 times daily 180 tablet 3     aspirin 81 MG EC tablet Take 81 mg by mouth daily       atenolol (TENORMIN) 25 MG tablet Take 1 tablet (25 mg) by mouth daily 100 tablet 3     citalopram (CELEXA) 20 MG tablet Take 20 mg by mouth       diclofenac (VOLTAREN) 50 MG EC tablet Take 1 tablet (50 mg) by mouth 2 times daily 60 tablet 1     folic acid (FOLVITE) 1 MG tablet Take 1 mg by mouth daily       furosemide (LASIX) 20 MG tablet TAKE 1 TABLET DAILY 100 tablet 4     gabapentin (NEURONTIN) 300 MG capsule Take 1 capsule (300 mg) by mouth 3 times daily 270 capsule 1     hydrOXYzine (ATARAX) 25 MG tablet TAKE 1 TABLET EVERY 6 HOURS AS NEEDED FOR ITCHING 90 tablet 3     losartan (COZAAR) 25 MG tablet Take 1 tablet (25 mg) by mouth daily 100 tablet 3     methocarbamol (ROBAXIN) 500 MG tablet TAKE 1 TABLET BY MOUTH THREE TIMES A DAY 90 tablet 1     mirtazapine (REMERON) 15 MG tablet Take 1 tablet (15 mg) by mouth At Bedtime 30 tablet 3     Multiple Vitamins-Minerals (CENTRUM SILVER) per tablet  Take 1 tablet by mouth daily       polyethylene glycol (MIRALAX) 17 g packet Take 17 g by mouth 2 times daily       pravastatin (PRAVACHOL) 20 MG tablet Take 1 tablet (20 mg) by mouth daily 100 tablet 4     QUEtiapine (SEROQUEL XR) 150 MG TB24 24 hr tablet Take 1 tablet (150 mg) by mouth At Bedtime 90 tablet 1     QUEtiapine (SEROQUEL) 50 MG tablet Take 1 tablet (50 mg) by mouth nightly as needed (Anxiety, insomnia, hallucinations) 30 tablet 8     senna-docusate (SENOKOT-S/PERICOLACE) 8.6-50 MG tablet Take 1 tablet by mouth 2 times daily       thiamine (B-1) 100 MG tablet Take 1 tablet (100 mg) by mouth daily       traZODone (DESYREL) 100 MG tablet TAKE 1 TABLET NIGHTLY AS NEEDED FOR SLEEP 90 tablet 2         ALLERGIES:  Allergies   Allergen Reactions     Bactrim [Sulfamethoxazole W/Trimethoprim] Hives     Codeine Itching     NAUSEA     Morphine Itching     NAUSEA       PAST MEDICAL HISTORY:    Past Medical History:   Diagnosis Date     Alcohol abuse     Long term alcohol abuse. Abstinenet since October 2019.     Anxiety disorder      Ascending aorta dilatation (H)      Bariatric surgery status 1996?    gastric bypass, [facility] [employer] and     Benign hypertension      Chronic insomnia      Chronic pain syndrome     Chronic back and neck pain, chronic pain due to osteoarthritis multiple joints     Coronary artery disease involving native coronary artery of native heart without angina pectoris 10/16/2018    Minimal coronary artery disease on coronary angiogram in 2015.      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     Major depressive disorder, single episode, severe, without mention of psychotic behavior      Mixed hyperlipidemia      Moderate aortic stenosis 05/2014     moderate to severe aortic valve stenosis     Pelvic relaxation disorder     Surgical intervention for cystocele/rectocele 3,11/2012     Personal history of urinary calculi 6/2006    left ureteral stone,lithotripsy     Psoriasis      PVC (premature ventricular contraction)      Spinal stenosis      Stage III chronic kidney disease (H) 2005     SVT (supraventricular tachycardia) (H)     likely atrial tachycardia       PAST SURGICAL HISTORY:    Past Surgical History:   Procedure Laterality Date     APPENDECTOMY  3/2004    incidental     ARTHRODESIS TOE(S) Right 1/31/2020    Procedure: RIGHT FIRST METATARSAL PHALANGEAL JOINT ARTHRODESIS;  Surgeon: Steven Reyes MD;  Location:  OR     C GASTRIC BYPASS,OBESE<100CM ARIANNA-EN-Y  1996     C MEDIASTINOSCOPY W OR WO BIOPSY  2/2008    Videomediastinoscopy and, for mediastinal adenopathy -reactive lymphoid hyperplasia     C REPAIR OF RECTOCELE  3/2012     C TOTAL KNEE ARTHROPLASTY  12/2005    left      CARPAL TUNNEL RELEASE RT/LT  10/2010    Carpometacarpal excisional arthroplasty with a fascial autograft and APL suspension sling (15412). 2. Left thumb metacarpophalangeal joint fusion with autologous bone graft (80305). 3. Left endoscopic carpal tunnel release      CHOLECYSTECTOMY, LAPOROSCOPIC  11/2010    Cholecystectomy, Laparoscopic     COLONOSCOPY N/A 9/8/2016    Procedure: COMBINED COLONOSCOPY, SINGLE OR MULTIPLE BIOPSY/POLYPECTOMY BY BIOPSY;  Surgeon: Moe Barlow MD;  Location:  GI     [facility]  [facility]2012    [facility] ent[facility], lysis of adhesions, placement of retropubic mid urethral sling, cystoscopy     CYSTOSCOPY, LITHOTRIPSY, COMBINED  6/2006    Left extracorporeal shock wave lithotripsy, cystoscopy, left ureteral stent placement.     CYSTOSCOPY, REMOVE STENT(S), COMBINED  7/2006    Cystoscopy, removal of left ureteral stent, retrograde pyelography, flexible and rigid ureteroscopy and holmium laser lithotripsy,  basket removal of stone fragments, ureteral stent placement.      ENDOSCOPIC ULTRASOUND UPPER GASTROINTESTINAL TRACT (GI) N/A 6/12/2017    Procedure: ENDOSCOPIC ULTRASOUND, ESOPHAGOSCOPY / UPPER GASTROINTESTINAL TRACT (GI);  ENDOSCOPIC ULTRASOUND, ESOPHAGOSCOPY / UPPER GASTROINTESTINAL TRACT (GI);  Surgeon: Parth Graham MD;  Location:  GI     HERNIA REPAIR  4/2012    bilateral augmentation mastopexy, ventral hernia repair, and medial thigh liposuction on 04/06/2012.      HYSTERECTOMY VAGINAL, BILATERAL SALPINGO-OOPHERECTOMY, COMBINED  1998    due to myoma and bleeding     JOINT REPLACEMENT, HIP RT/LT  4/2004    right total hip arthroplasty     LAPAROTOMY, LYSIS ADHESIONS, COMBINED  3/2004    lysis adhesions, ventral hernia repair, appendectomy incidentally     LYMPH NODE BIOPSY  4/2008    right axillary, reactive follicular and paracortical hyperplasia.     MAMMOPLASTY AUGMENTATION BILATERAL  4/2012     REPAIR HAMMER TOE Right 1/31/2020    Procedure: WITH SECOND AND THIRD CLAW TOE RECONSTRUCTION;  Surgeon: Steven Reyes MD;  Location:  OR     REVISE RECONSTRUCTED BREAST  6/7/2012    Left breast capsulotomy.        FAMILY HISTORY:    Family History   Problem Relation Age of Onset     Substance Abuse Father      Cancer Father         throat and lung mets     Diabetes No family hx of      Coronary Artery Disease No family hx of      Cerebrovascular Disease No family hx of        SOCIAL HISTORY:    Social History     Socioeconomic History     Marital status:      Spouse name: Mt     Number of children: 4     Years of education: 18     Highest education level: None   Occupational History     Occupation: nurse     Employer: [employer]     Employer: RETIRED   Tobacco Use     Smoking status: Never Smoker     Smokeless tobacco: Never Used   Substance and Sexual Activity     Alcohol use: Not Currently     Alcohol/week: 63.0 standard drinks     Types: 63 Standard drinks or equivalent per week      "Comment: March 23rd Sobriety date     Drug use: No     Sexual activity: Yes     Partners: Male   Other Topics Concern      Service Not Asked     Blood Transfusions No     Caffeine Concern Yes     Comment: 1-2 cups per day      Occupational Exposure Yes     Comment: blood     Hobby Hazards No     Sleep Concern Yes     Stress Concern Yes     Weight Concern Yes     Comment: gastric  byepass     Special Diet No     Back Care No     Exercise Yes     Comment: walk, swin     Bike Helmet No     Seat Belt Yes     Self-Exams Yes     Parent/sibling w/ CABG, MI or angioplasty before 65F 55M? Not Asked   Social History Narrative     None     Social Determinants of Health     Financial Resource Strain: Not on file   Food Insecurity: Not on file   Transportation Needs: Not on file   Physical Activity: Not on file   Stress: Not on file   Social Connections: Not on file   Intimate Partner Violence: Not on file   Housing Stability: Not on file       PHYSICAL EXAM:    Vitals: /61   Pulse 61   Ht 1.626 m (5' 4\")   Wt 85.3 kg (188 lb)   SpO2 100%   BMI 32.27 kg/m    Wt Readings from Last 5 Encounters:   11/24/21 85.3 kg (188 lb)   06/25/21 78.9 kg (174 lb)   05/12/21 79.8 kg (176 lb)   03/24/21 77.2 kg (170 lb 3.2 oz)   03/16/21 79.4 kg (175 lb)         Encounter Diagnoses   Name Primary?     Moderate aortic valve stenosis Yes     Benign essential hypertension      Hyperlipidemia LDL goal <100      Alcohol use disorder, severe, dependence (H)        Orders Placed This Encounter   Procedures     Follow-Up with Cardiologist     Echocardiogram Complete           Thank you for allowing me to participate in the care of your patient.      Sincerely,     Herman Ugarte MD     [facility]  cc:   No referring provider defined for this encounter.        "

## 2021-12-06 DIAGNOSIS — F51.04 CHRONIC INSOMNIA: ICD-10-CM

## 2021-12-08 ENCOUNTER — TELEPHONE (OUTPATIENT)
Dept: FAMILY MEDICINE | Facility: CLINIC | Age: 76
End: 2021-12-08
Payer: COMMERCIAL

## 2021-12-08 NOTE — TELEPHONE ENCOUNTER
Reason for Call:  Form, our goal is to have forms completed with 72 hours, however, some forms may require a visit or additional information.    Type of letter, form or note:  RX Clarification    Who is the form from?: [employer] (if other please explain)    Where did the form come from: form was faxed in    What clinic location was the form placed at?: [facility]    Where the form was placed: Nayeli Castorena Box/Folder    What number is listed as a contact on the form?: [phone] is the fax number           Call taken on 12/8/2021 at 4:58 PM by Yuliana Rayo

## 2021-12-08 NOTE — CONFIDENTIAL NOTE
Due for  Med check please call to schedule then route to RN   Thank you     Ryann Harrison RN, BSN  [facility]

## 2021-12-09 RX ORDER — QUETIAPINE 150 MG/1
150 TABLET, FILM COATED, EXTENDED RELEASE ORAL AT BEDTIME
Qty: 30 TABLET | Refills: 1 | Status: SHIPPED | OUTPATIENT
Start: 2021-12-09 | End: 2022-01-06

## 2021-12-09 NOTE — TELEPHONE ENCOUNTER
Forms signed and  [facility] back [facility] at [phone]. Originals sent to be scanned.     Iris Galo

## 2021-12-09 NOTE — TELEPHONE ENCOUNTER
Occupational Therapy -12S    Patient not seen in therapy today.     Re-attempt plan: per established plan of care    Pt off floor for mediport placement.                         Documented in the chart in the following areas: Assessment. Plan.       Rx refill written.    Jin Ackerman MD

## 2021-12-10 RX ORDER — CITALOPRAM HYDROBROMIDE 20 MG/1
20 TABLET ORAL DAILY
Qty: 30 TABLET | Refills: 1 | Status: CANCELLED | OUTPATIENT
Start: 2021-12-10

## 2021-12-15 DIAGNOSIS — F41.9 ANXIETY: ICD-10-CM

## 2021-12-15 DIAGNOSIS — F32.0 MILD MAJOR DEPRESSION (H): Primary | ICD-10-CM

## 2021-12-15 RX ORDER — CITALOPRAM HYDROBROMIDE 20 MG/1
20 TABLET ORAL DAILY
Qty: 90 TABLET | Refills: 0 | Status: SHIPPED | OUTPATIENT
Start: 2021-12-15 | End: 2022-02-08

## 2021-12-16 NOTE — TELEPHONE ENCOUNTER
citalopram (CELEXA) 20 MG tablet   4/16/2021  --   Sig - Route: Take 20 mg by mouth - Oral   Class: Historical   Order: [id number]   LOV 5/12/2021    Routing refill request to provider for review/approval because:  Medication is reported/historical    Ryann Harrison RN, BSN  [facility]

## 2021-12-16 NOTE — TELEPHONE ENCOUNTER
90 days sent to pharmacy.  Needs med check for further fills.  OK for video visit or in person.    Nayeli Castorena MBA, MS, PA-C  [facility]

## 2021-12-17 ENCOUNTER — APPOINTMENT (OUTPATIENT)
Dept: CT IMAGING | Facility: CLINIC | Age: 76
DRG: 202 | End: 2021-12-17
Attending: PHYSICIAN ASSISTANT
Payer: COMMERCIAL

## 2021-12-17 ENCOUNTER — HOSPITAL ENCOUNTER (INPATIENT)
Facility: CLINIC | Age: 76
LOS: 10 days | Discharge: HOME HEALTH | DRG: 202 | End: 2021-12-27
Attending: PHYSICIAN ASSISTANT | Admitting: INTERNAL MEDICINE
Payer: COMMERCIAL

## 2021-12-17 ENCOUNTER — APPOINTMENT (OUTPATIENT)
Dept: GENERAL RADIOLOGY | Facility: CLINIC | Age: 76
DRG: 202 | End: 2021-12-17
Attending: PHYSICIAN ASSISTANT
Payer: COMMERCIAL

## 2021-12-17 DIAGNOSIS — J45.901 REACTIVE AIRWAY DISEASE WITH ACUTE EXACERBATION, UNSPECIFIED ASTHMA SEVERITY, UNSPECIFIED WHETHER PERSISTENT: ICD-10-CM

## 2021-12-17 DIAGNOSIS — I10 BENIGN ESSENTIAL HYPERTENSION: ICD-10-CM

## 2021-12-17 DIAGNOSIS — J45.21 MILD INTERMITTENT REACTIVE AIRWAY DISEASE WITH ACUTE EXACERBATION: ICD-10-CM

## 2021-12-17 DIAGNOSIS — R79.89 ELEVATED BRAIN NATRIURETIC PEPTIDE (BNP) LEVEL: ICD-10-CM

## 2021-12-17 DIAGNOSIS — F10.930 ALCOHOL WITHDRAWAL SYNDROME WITHOUT COMPLICATION (H): ICD-10-CM

## 2021-12-17 DIAGNOSIS — E87.1 HYPONATREMIA: ICD-10-CM

## 2021-12-17 DIAGNOSIS — F10.939 ALCOHOL WITHDRAWAL (H): ICD-10-CM

## 2021-12-17 DIAGNOSIS — J96.01 ACUTE RESPIRATORY FAILURE WITH HYPOXIA (H): ICD-10-CM

## 2021-12-17 DIAGNOSIS — J06.9 VIRAL URI: Primary | ICD-10-CM

## 2021-12-17 DIAGNOSIS — I28.8 ENLARGED PULMONARY ARTERY (H): ICD-10-CM

## 2021-12-17 LAB
ALBUMIN SERPL-MCNC: 3.7 G/DL (ref 3.4–5)
ALP SERPL-CCNC: 134 U/L (ref 40–150)
ALT SERPL W P-5'-P-CCNC: 17 U/L (ref 0–50)
ANION GAP SERPL CALCULATED.3IONS-SCNC: 7 MMOL/L (ref 3–14)
AST SERPL W P-5'-P-CCNC: 19 U/L (ref 0–45)
ATRIAL RATE - MUSE: 73 BPM
BASE EXCESS BLDV CALC-SCNC: 5.3 MMOL/L (ref -7.7–1.9)
BASOPHILS # BLD AUTO: 0.1 10E3/UL (ref 0–0.2)
BASOPHILS NFR BLD AUTO: 1 %
BILIRUB SERPL-MCNC: 0.4 MG/DL (ref 0.2–1.3)
BUN SERPL-MCNC: 9 MG/DL (ref 7–30)
CALCIUM SERPL-MCNC: 9.2 MG/DL (ref 8.5–10.1)
CHLORIDE BLD-SCNC: 88 MMOL/L (ref 94–109)
CO2 SERPL-SCNC: 30 MMOL/L (ref 20–32)
CREAT SERPL-MCNC: 0.84 MG/DL (ref 0.52–1.04)
DIASTOLIC BLOOD PRESSURE - MUSE: NORMAL MMHG
EOSINOPHIL # BLD AUTO: 0 10E3/UL (ref 0–0.7)
EOSINOPHIL NFR BLD AUTO: 1 %
ERYTHROCYTE [DISTWIDTH] IN BLOOD BY AUTOMATED COUNT: 13.8 % (ref 10–15)
FLUAV RNA SPEC QL NAA+PROBE: NEGATIVE
FLUBV RNA RESP QL NAA+PROBE: NEGATIVE
GFR SERPL CREATININE-BSD FRML MDRD: 67 ML/MIN/1.73M2
GLUCOSE BLD-MCNC: 128 MG/DL (ref 70–99)
HCO3 BLDV-SCNC: 32 MMOL/L (ref 21–28)
HCT VFR BLD AUTO: 33 % (ref 35–47)
HGB BLD-MCNC: 11 G/DL (ref 11.7–15.7)
HOLD SPECIMEN: NORMAL
HOLD SPECIMEN: NORMAL
IMM GRANULOCYTES # BLD: 0.1 10E3/UL
IMM GRANULOCYTES NFR BLD: 1 %
INTERPRETATION ECG - MUSE: NORMAL
LYMPHOCYTES # BLD AUTO: 0.7 10E3/UL (ref 0.8–5.3)
LYMPHOCYTES NFR BLD AUTO: 8 %
MAGNESIUM SERPL-MCNC: 1.4 MG/DL (ref 1.6–2.3)
MAGNESIUM SERPL-MCNC: 1.5 MG/DL (ref 1.6–2.3)
MCH RBC QN AUTO: 30 PG (ref 26.5–33)
MCHC RBC AUTO-ENTMCNC: 33.3 G/DL (ref 31.5–36.5)
MCV RBC AUTO: 90 FL (ref 78–100)
MONOCYTES # BLD AUTO: 1.2 10E3/UL (ref 0–1.3)
MONOCYTES NFR BLD AUTO: 15 %
NEUTROPHILS # BLD AUTO: 6.3 10E3/UL (ref 1.6–8.3)
NEUTROPHILS NFR BLD AUTO: 74 %
NRBC # BLD AUTO: 0 10E3/UL
NRBC BLD AUTO-RTO: 0 /100
NT-PROBNP SERPL-MCNC: 2896 PG/ML (ref 0–1800)
O2/TOTAL GAS SETTING VFR VENT: 3 %
OSMOLALITY UR: 322 MMOL/KG (ref 100–1200)
OXYHGB MFR BLDV: 59 % (ref 70–75)
P AXIS - MUSE: 71 DEGREES
PCO2 BLDV: 58 MM HG (ref 40–50)
PH BLDV: 7.36 [PH] (ref 7.32–7.43)
PHOSPHATE SERPL-MCNC: 3.4 MG/DL (ref 2.5–4.5)
PLATELET # BLD AUTO: 265 10E3/UL (ref 150–450)
PO2 BLDV: 34 MM HG (ref 25–47)
POTASSIUM BLD-SCNC: 3.2 MMOL/L (ref 3.4–5.3)
POTASSIUM BLD-SCNC: 3.2 MMOL/L (ref 3.4–5.3)
POTASSIUM BLD-SCNC: 3.4 MMOL/L (ref 3.4–5.3)
PR INTERVAL - MUSE: 222 MS
PROCALCITONIN SERPL-MCNC: <0.05 NG/ML
PROT SERPL-MCNC: 8.4 G/DL (ref 6.8–8.8)
QRS DURATION - MUSE: 64 MS
QT - MUSE: 438 MS
QTC - MUSE: 482 MS
R AXIS - MUSE: -23 DEGREES
RBC # BLD AUTO: 3.67 10E6/UL (ref 3.8–5.2)
SARS-COV-2 RNA RESP QL NAA+PROBE: NEGATIVE
SODIUM SERPL-SCNC: 125 MMOL/L (ref 133–144)
SODIUM UR-SCNC: 53 MMOL/L
SYSTOLIC BLOOD PRESSURE - MUSE: NORMAL MMHG
T AXIS - MUSE: 39 DEGREES
TROPONIN I SERPL HS-MCNC: 16 NG/L
TROPONIN I SERPL HS-MCNC: 16 NG/L
VENTRICULAR RATE- MUSE: 73 BPM
WBC # BLD AUTO: 8.4 10E3/UL (ref 4–11)

## 2021-12-17 PROCEDURE — 84100 ASSAY OF PHOSPHORUS: CPT | Performed by: INTERNAL MEDICINE

## 2021-12-17 PROCEDURE — 83735 ASSAY OF MAGNESIUM: CPT | Performed by: PHYSICIAN ASSISTANT

## 2021-12-17 PROCEDURE — 250N000013 HC RX MED GY IP 250 OP 250 PS 637: Performed by: INTERNAL MEDICINE

## 2021-12-17 PROCEDURE — 83735 ASSAY OF MAGNESIUM: CPT | Performed by: INTERNAL MEDICINE

## 2021-12-17 PROCEDURE — 83880 ASSAY OF NATRIURETIC PEPTIDE: CPT | Performed by: PHYSICIAN ASSISTANT

## 2021-12-17 PROCEDURE — 250N000009 HC RX 250: Performed by: PHYSICIAN ASSISTANT

## 2021-12-17 PROCEDURE — 250N000011 HC RX IP 250 OP 636: Performed by: PHYSICIAN ASSISTANT

## 2021-12-17 PROCEDURE — 93005 ELECTROCARDIOGRAM TRACING: CPT

## 2021-12-17 PROCEDURE — C9803 HOPD COVID-19 SPEC COLLECT: HCPCS

## 2021-12-17 PROCEDURE — 83935 ASSAY OF URINE OSMOLALITY: CPT | Performed by: INTERNAL MEDICINE

## 2021-12-17 PROCEDURE — 96374 THER/PROPH/DIAG INJ IV PUSH: CPT | Mod: 59

## 2021-12-17 PROCEDURE — 250N000013 HC RX MED GY IP 250 OP 250 PS 637: Performed by: PHYSICIAN ASSISTANT

## 2021-12-17 PROCEDURE — 36415 COLL VENOUS BLD VENIPUNCTURE: CPT | Performed by: PHYSICIAN ASSISTANT

## 2021-12-17 PROCEDURE — 71275 CT ANGIOGRAPHY CHEST: CPT

## 2021-12-17 PROCEDURE — 85025 COMPLETE CBC W/AUTO DIFF WBC: CPT | Performed by: PHYSICIAN ASSISTANT

## 2021-12-17 PROCEDURE — 82040 ASSAY OF SERUM ALBUMIN: CPT | Performed by: PHYSICIAN ASSISTANT

## 2021-12-17 PROCEDURE — HZ2ZZZZ DETOXIFICATION SERVICES FOR SUBSTANCE ABUSE TREATMENT: ICD-10-PCS | Performed by: PSYCHOLOGIST

## 2021-12-17 PROCEDURE — 84145 PROCALCITONIN (PCT): CPT | Performed by: PHYSICIAN ASSISTANT

## 2021-12-17 PROCEDURE — 87636 SARSCOV2 & INF A&B AMP PRB: CPT | Performed by: PHYSICIAN ASSISTANT

## 2021-12-17 PROCEDURE — 80053 COMPREHEN METABOLIC PANEL: CPT | Performed by: PHYSICIAN ASSISTANT

## 2021-12-17 PROCEDURE — 82805 BLOOD GASES W/O2 SATURATION: CPT | Performed by: PHYSICIAN ASSISTANT

## 2021-12-17 PROCEDURE — 36415 COLL VENOUS BLD VENIPUNCTURE: CPT | Performed by: INTERNAL MEDICINE

## 2021-12-17 PROCEDURE — 94640 AIRWAY INHALATION TREATMENT: CPT

## 2021-12-17 PROCEDURE — 84132 ASSAY OF SERUM POTASSIUM: CPT | Performed by: INTERNAL MEDICINE

## 2021-12-17 PROCEDURE — 99285 EMERGENCY DEPT VISIT HI MDM: CPT | Mod: 25

## 2021-12-17 PROCEDURE — 84484 ASSAY OF TROPONIN QUANT: CPT | Performed by: PHYSICIAN ASSISTANT

## 2021-12-17 PROCEDURE — 250N000013 HC RX MED GY IP 250 OP 250 PS 637: Performed by: NURSE PRACTITIONER

## 2021-12-17 PROCEDURE — 84300 ASSAY OF URINE SODIUM: CPT | Performed by: INTERNAL MEDICINE

## 2021-12-17 PROCEDURE — 96376 TX/PRO/DX INJ SAME DRUG ADON: CPT

## 2021-12-17 PROCEDURE — 120N000001 HC R&B MED SURG/OB

## 2021-12-17 PROCEDURE — 71045 X-RAY EXAM CHEST 1 VIEW: CPT

## 2021-12-17 PROCEDURE — 96375 TX/PRO/DX INJ NEW DRUG ADDON: CPT

## 2021-12-17 PROCEDURE — 99223 1ST HOSP IP/OBS HIGH 75: CPT | Mod: AI | Performed by: INTERNAL MEDICINE

## 2021-12-17 RX ORDER — POLYETHYLENE GLYCOL 3350 17 G/17G
17 POWDER, FOR SOLUTION ORAL 2 TIMES DAILY PRN
COMMUNITY

## 2021-12-17 RX ORDER — ACETAMINOPHEN 325 MG/1
650 TABLET ORAL EVERY 6 HOURS PRN
Status: DISCONTINUED | OUTPATIENT
Start: 2021-12-17 | End: 2021-12-27 | Stop reason: HOSPADM

## 2021-12-17 RX ORDER — ONDANSETRON 2 MG/ML
4 INJECTION INTRAMUSCULAR; INTRAVENOUS EVERY 6 HOURS PRN
Status: DISCONTINUED | OUTPATIENT
Start: 2021-12-17 | End: 2021-12-27 | Stop reason: HOSPADM

## 2021-12-17 RX ORDER — MULTIPLE VITAMINS W/ MINERALS TAB 9MG-400MCG
1 TAB ORAL DAILY
Status: DISCONTINUED | OUTPATIENT
Start: 2021-12-17 | End: 2021-12-27 | Stop reason: HOSPADM

## 2021-12-17 RX ORDER — POTASSIUM CHLORIDE 7.45 MG/ML
10 INJECTION INTRAVENOUS ONCE
Status: COMPLETED | OUTPATIENT
Start: 2021-12-17 | End: 2021-12-17

## 2021-12-17 RX ORDER — CITALOPRAM HYDROBROMIDE 20 MG/1
20 TABLET ORAL DAILY
Status: DISCONTINUED | OUTPATIENT
Start: 2021-12-17 | End: 2021-12-27 | Stop reason: HOSPADM

## 2021-12-17 RX ORDER — GABAPENTIN 300 MG/1
300 CAPSULE ORAL 3 TIMES DAILY
Status: DISCONTINUED | OUTPATIENT
Start: 2021-12-17 | End: 2021-12-27 | Stop reason: HOSPADM

## 2021-12-17 RX ORDER — IPRATROPIUM BROMIDE AND ALBUTEROL SULFATE 2.5; .5 MG/3ML; MG/3ML
3 SOLUTION RESPIRATORY (INHALATION) ONCE
Status: COMPLETED | OUTPATIENT
Start: 2021-12-17 | End: 2021-12-17

## 2021-12-17 RX ORDER — FUROSEMIDE 10 MG/ML
40 INJECTION INTRAMUSCULAR; INTRAVENOUS ONCE
Status: DISCONTINUED | OUTPATIENT
Start: 2021-12-17 | End: 2021-12-17

## 2021-12-17 RX ORDER — ALBUTEROL SULFATE 90 UG/1
6 AEROSOL, METERED RESPIRATORY (INHALATION) ONCE
Status: COMPLETED | OUTPATIENT
Start: 2021-12-17 | End: 2021-12-17

## 2021-12-17 RX ORDER — AMOXICILLIN 250 MG
1 CAPSULE ORAL 2 TIMES DAILY PRN
Status: DISCONTINUED | OUTPATIENT
Start: 2021-12-17 | End: 2021-12-27 | Stop reason: HOSPADM

## 2021-12-17 RX ORDER — POLYETHYLENE GLYCOL 3350 17 G/17G
17 POWDER, FOR SOLUTION ORAL DAILY PRN
Status: DISCONTINUED | OUTPATIENT
Start: 2021-12-17 | End: 2021-12-27 | Stop reason: HOSPADM

## 2021-12-17 RX ORDER — IOPAMIDOL 755 MG/ML
68 INJECTION, SOLUTION INTRAVASCULAR ONCE
Status: COMPLETED | OUTPATIENT
Start: 2021-12-17 | End: 2021-12-17

## 2021-12-17 RX ORDER — ONDANSETRON 4 MG/1
4 TABLET, ORALLY DISINTEGRATING ORAL EVERY 6 HOURS PRN
Status: DISCONTINUED | OUTPATIENT
Start: 2021-12-17 | End: 2021-12-27 | Stop reason: HOSPADM

## 2021-12-17 RX ORDER — FUROSEMIDE 10 MG/ML
40 INJECTION INTRAMUSCULAR; INTRAVENOUS ONCE
Status: COMPLETED | OUTPATIENT
Start: 2021-12-17 | End: 2021-12-17

## 2021-12-17 RX ORDER — OLANZAPINE 5 MG/1
5-10 TABLET, ORALLY DISINTEGRATING ORAL EVERY 6 HOURS PRN
Status: DISCONTINUED | OUTPATIENT
Start: 2021-12-17 | End: 2021-12-27 | Stop reason: HOSPADM

## 2021-12-17 RX ORDER — HALOPERIDOL 5 MG/ML
2.5-5 INJECTION INTRAMUSCULAR EVERY 6 HOURS PRN
Status: DISCONTINUED | OUTPATIENT
Start: 2021-12-17 | End: 2021-12-27 | Stop reason: HOSPADM

## 2021-12-17 RX ORDER — ALBUTEROL SULFATE 90 UG/1
2 AEROSOL, METERED RESPIRATORY (INHALATION) EVERY 6 HOURS PRN
Status: DISCONTINUED | OUTPATIENT
Start: 2021-12-17 | End: 2021-12-27 | Stop reason: HOSPADM

## 2021-12-17 RX ORDER — MIRTAZAPINE 15 MG/1
15 TABLET, FILM COATED ORAL AT BEDTIME
Status: DISCONTINUED | OUTPATIENT
Start: 2021-12-17 | End: 2021-12-27 | Stop reason: HOSPADM

## 2021-12-17 RX ORDER — LIDOCAINE 40 MG/G
CREAM TOPICAL
Status: DISCONTINUED | OUTPATIENT
Start: 2021-12-17 | End: 2021-12-27 | Stop reason: HOSPADM

## 2021-12-17 RX ORDER — LOSARTAN POTASSIUM 25 MG/1
25 TABLET ORAL DAILY
Status: DISCONTINUED | OUTPATIENT
Start: 2021-12-17 | End: 2021-12-21

## 2021-12-17 RX ORDER — FLUMAZENIL 0.1 MG/ML
0.2 INJECTION, SOLUTION INTRAVENOUS
Status: DISCONTINUED | OUTPATIENT
Start: 2021-12-17 | End: 2021-12-27 | Stop reason: HOSPADM

## 2021-12-17 RX ORDER — PROCHLORPERAZINE 25 MG
12.5 SUPPOSITORY, RECTAL RECTAL EVERY 12 HOURS PRN
Status: DISCONTINUED | OUTPATIENT
Start: 2021-12-17 | End: 2021-12-27 | Stop reason: HOSPADM

## 2021-12-17 RX ORDER — ACETAMINOPHEN 500 MG
1000 TABLET ORAL 2 TIMES DAILY PRN
COMMUNITY

## 2021-12-17 RX ORDER — LORAZEPAM 2 MG/ML
1-2 INJECTION INTRAMUSCULAR EVERY 30 MIN PRN
Status: DISCONTINUED | OUTPATIENT
Start: 2021-12-17 | End: 2021-12-25

## 2021-12-17 RX ORDER — FOLIC ACID 1 MG/1
1 TABLET ORAL DAILY
Status: DISCONTINUED | OUTPATIENT
Start: 2021-12-17 | End: 2021-12-27 | Stop reason: HOSPADM

## 2021-12-17 RX ORDER — LORAZEPAM 1 MG/1
1-2 TABLET ORAL EVERY 30 MIN PRN
Status: DISCONTINUED | OUTPATIENT
Start: 2021-12-17 | End: 2021-12-25

## 2021-12-17 RX ORDER — METHYLPREDNISOLONE SODIUM SUCCINATE 125 MG/2ML
125 INJECTION, POWDER, LYOPHILIZED, FOR SOLUTION INTRAMUSCULAR; INTRAVENOUS ONCE
Status: DISCONTINUED | OUTPATIENT
Start: 2021-12-17 | End: 2021-12-17

## 2021-12-17 RX ORDER — ACETAMINOPHEN 650 MG/1
650 SUPPOSITORY RECTAL EVERY 6 HOURS PRN
Status: DISCONTINUED | OUTPATIENT
Start: 2021-12-17 | End: 2021-12-27 | Stop reason: HOSPADM

## 2021-12-17 RX ORDER — BISACODYL 5 MG
5 TABLET, DELAYED RELEASE (ENTERIC COATED) ORAL DAILY PRN
Status: DISCONTINUED | OUTPATIENT
Start: 2021-12-17 | End: 2021-12-27 | Stop reason: HOSPADM

## 2021-12-17 RX ORDER — IPRATROPIUM BROMIDE AND ALBUTEROL SULFATE 2.5; .5 MG/3ML; MG/3ML
3 SOLUTION RESPIRATORY (INHALATION)
Status: DISCONTINUED | OUTPATIENT
Start: 2021-12-17 | End: 2021-12-18

## 2021-12-17 RX ORDER — PRAVASTATIN SODIUM 20 MG
20 TABLET ORAL DAILY
Status: DISCONTINUED | OUTPATIENT
Start: 2021-12-17 | End: 2021-12-27 | Stop reason: HOSPADM

## 2021-12-17 RX ORDER — QUETIAPINE FUMARATE 25 MG/1
50 TABLET, FILM COATED ORAL
Status: DISCONTINUED | OUTPATIENT
Start: 2021-12-17 | End: 2021-12-27 | Stop reason: HOSPADM

## 2021-12-17 RX ORDER — GUAIFENESIN/DEXTROMETHORPHAN 100-10MG/5
10 SYRUP ORAL EVERY 4 HOURS PRN
COMMUNITY
End: 2022-01-13

## 2021-12-17 RX ORDER — ATENOLOL 25 MG/1
25 TABLET ORAL DAILY
Status: DISCONTINUED | OUTPATIENT
Start: 2021-12-17 | End: 2021-12-27 | Stop reason: HOSPADM

## 2021-12-17 RX ORDER — QUETIAPINE 150 MG/1
150 TABLET, FILM COATED, EXTENDED RELEASE ORAL AT BEDTIME
Status: DISCONTINUED | OUTPATIENT
Start: 2021-12-17 | End: 2021-12-27 | Stop reason: HOSPADM

## 2021-12-17 RX ORDER — PROCHLORPERAZINE MALEATE 5 MG
5 TABLET ORAL EVERY 6 HOURS PRN
Status: DISCONTINUED | OUTPATIENT
Start: 2021-12-17 | End: 2021-12-27 | Stop reason: HOSPADM

## 2021-12-17 RX ORDER — LORAZEPAM 2 MG/ML
1 INJECTION INTRAMUSCULAR ONCE
Status: COMPLETED | OUTPATIENT
Start: 2021-12-17 | End: 2021-12-17

## 2021-12-17 RX ORDER — ASPIRIN 81 MG/1
81 TABLET ORAL DAILY
Status: DISCONTINUED | OUTPATIENT
Start: 2021-12-17 | End: 2021-12-27 | Stop reason: HOSPADM

## 2021-12-17 RX ORDER — POTASSIUM CHLORIDE 1500 MG/1
40 TABLET, EXTENDED RELEASE ORAL ONCE
Status: COMPLETED | OUTPATIENT
Start: 2021-12-17 | End: 2021-12-17

## 2021-12-17 RX ORDER — POTASSIUM CHLORIDE 7.45 MG/ML
10 INJECTION INTRAVENOUS ONCE
Status: DISCONTINUED | OUTPATIENT
Start: 2021-12-17 | End: 2021-12-17

## 2021-12-17 RX ORDER — TRAZODONE HYDROCHLORIDE 100 MG/1
100 TABLET ORAL
Status: DISCONTINUED | OUTPATIENT
Start: 2021-12-17 | End: 2021-12-27 | Stop reason: HOSPADM

## 2021-12-17 RX ORDER — POTASSIUM CHLORIDE 1500 MG/1
40 TABLET, EXTENDED RELEASE ORAL ONCE
Status: DISCONTINUED | OUTPATIENT
Start: 2021-12-17 | End: 2021-12-17

## 2021-12-17 RX ORDER — AMOXICILLIN 250 MG
2 CAPSULE ORAL 2 TIMES DAILY PRN
Status: DISCONTINUED | OUTPATIENT
Start: 2021-12-17 | End: 2021-12-27 | Stop reason: HOSPADM

## 2021-12-17 RX ADMIN — PRAVASTATIN SODIUM 20 MG: 20 TABLET ORAL at 20:40

## 2021-12-17 RX ADMIN — GABAPENTIN 300 MG: 300 CAPSULE ORAL at 21:49

## 2021-12-17 RX ADMIN — MIRTAZAPINE 15 MG: 15 TABLET, FILM COATED ORAL at 21:49

## 2021-12-17 RX ADMIN — IPRATROPIUM BROMIDE AND ALBUTEROL SULFATE 3 ML: .5; 3 SOLUTION RESPIRATORY (INHALATION) at 16:45

## 2021-12-17 RX ADMIN — ASPIRIN 81 MG: 81 TABLET, COATED ORAL at 20:41

## 2021-12-17 RX ADMIN — ATENOLOL 25 MG: 25 TABLET ORAL at 20:41

## 2021-12-17 RX ADMIN — THIAMINE HCL TAB 100 MG 100 MG: 100 TAB at 20:41

## 2021-12-17 RX ADMIN — FUROSEMIDE 40 MG: 10 INJECTION, SOLUTION INTRAVENOUS at 17:05

## 2021-12-17 RX ADMIN — LORAZEPAM 1 MG: 2 INJECTION INTRAMUSCULAR; INTRAVENOUS at 18:16

## 2021-12-17 RX ADMIN — QUETIAPINE FUMARATE 150 MG: 150 TABLET, EXTENDED RELEASE ORAL at 21:49

## 2021-12-17 RX ADMIN — CITALOPRAM HYDROBROMIDE 20 MG: 20 TABLET ORAL at 20:41

## 2021-12-17 RX ADMIN — IOPAMIDOL 68 ML: 755 INJECTION, SOLUTION INTRAVENOUS at 14:41

## 2021-12-17 RX ADMIN — FOLIC ACID 1 MG: 1 TABLET ORAL at 20:40

## 2021-12-17 RX ADMIN — SODIUM CHLORIDE 92 ML: 900 INJECTION INTRAVENOUS at 14:41

## 2021-12-17 RX ADMIN — POTASSIUM CHLORIDE 10 MEQ: 7.46 INJECTION, SOLUTION INTRAVENOUS at 16:44

## 2021-12-17 RX ADMIN — BISACODYL 5 MG: 5 TABLET, COATED ORAL at 21:49

## 2021-12-17 RX ADMIN — MULTIPLE VITAMINS W/ MINERALS TAB 1 TABLET: TAB at 20:40

## 2021-12-17 RX ADMIN — MELATONIN 5 MG TABLET 5 MG: at 21:49

## 2021-12-17 RX ADMIN — ALBUTEROL SULFATE 6 PUFF: 90 AEROSOL, METERED RESPIRATORY (INHALATION) at 14:04

## 2021-12-17 RX ADMIN — POTASSIUM CHLORIDE 40 MEQ: 1500 TABLET, EXTENDED RELEASE ORAL at 23:01

## 2021-12-17 RX ADMIN — LOSARTAN POTASSIUM 25 MG: 25 TABLET, FILM COATED ORAL at 20:41

## 2021-12-17 RX ADMIN — LORAZEPAM 1 MG: 2 INJECTION INTRAMUSCULAR; INTRAVENOUS at 17:05

## 2021-12-17 ASSESSMENT — MIFFLIN-ST. JEOR
SCORE: 1317.76
SCORE: 1281.47

## 2021-12-17 ASSESSMENT — ACTIVITIES OF DAILY LIVING (ADL)
ADLS_ACUITY_SCORE: 7
WHICH_OF_THE_ABOVE_FUNCTIONAL_RISKS_HAD_A_RECENT_ONSET_OR_CHANGE?: AMBULATION
ADLS_ACUITY_SCORE: 10
FALL_HISTORY_WITHIN_LAST_SIX_MONTHS: NO
ADLS_ACUITY_SCORE: 7
CONCENTRATING,_REMEMBERING_OR_MAKING_DECISIONS_DIFFICULTY: NO
ADLS_ACUITY_SCORE: 10
ADLS_ACUITY_SCORE: 7
HEARING_DIFFICULTY_OR_DEAF: NO
DIFFICULTY_COMMUNICATING: NO
TOILETING_ISSUES: NO
ADLS_ACUITY_SCORE: 7
WALKING_OR_CLIMBING_STAIRS: AMBULATION DIFFICULTY, REQUIRES EQUIPMENT
DRESSING/BATHING_DIFFICULTY: NO
WALKING_OR_CLIMBING_STAIRS_DIFFICULTY: YES
DIFFICULTY_EATING/SWALLOWING: NO
EQUIPMENT_CURRENTLY_USED_AT_HOME: CANE, STRAIGHT
ADLS_ACUITY_SCORE: 7
DOING_ERRANDS_INDEPENDENTLY_DIFFICULTY: NO
WEAR_GLASSES_OR_BLIND: NO

## 2021-12-17 NOTE — ED PROVIDER NOTES
History     Chief Complaint:  Shortness of Breath and Covid Concern     HPI   Kavitha Headley is a 76 year old female who presents with history of CAD, diastolic CHF, aortic stenosis, who presents with cough, shortness of breath, weakness.  She also reports a low-grade headache and some body aches.  She states the symptoms began on Monday but have worsened with increased shortness of breath over the last 2 days.  She was satting in the mid 80s for EMS and put on 2 L and brought to the ED.  Here she reports some mild chest discomfort mostly with coughing for the last 2 to 3 days.  She denies any leg swelling.  She has been taking her Lasix as directed.  She has not noted fever.  She is vaccinated against COVID-19.  No known sick exposures.  No hemoptysis.  She is not a smoker.  No history of COPD or asthma.  She does not normally require any oxygen at home.  Her cough has been productive.    ROS:  Review of Systems   All other systems reviewed and are negative.       Allergies:  Bactrim  Quetiapine  Hydromorphone  Codeine  Morphine     Medications:    Campral  Aspirin 81 mg  Atenolol  Celexa  Voltaren  Lasix  Gabapentin  Hydroxyzine  Cozaar  Robaxin  Remeron  Pravastatin  Seroquel  Trazodone  Valacyclovir  Melatonin    Past Medical History:    Alcohol abuse  Anxiety  Ascending aorta dilation  Hypertension  Chronic insomnia  Chronic pain syndrome  Coronary artery disease  GERD  Kidney stones  Liver disease due to alcohol  Macrocytic anemia  Depression  Hyperlipidemia  Aortic stenosis  Pelvic relaxation disorder  Psoriasis  Premature ventricular contraction  Spinal stenosis  CKD stage III  SVT  Vitamin B12 deficiency   Alcohol use disorder  Thiamine deficiency  Herpes simplex   Heart murmur, systolic  Cellulitis  Intervertebral disc disorder  CHF  DJD  Dystonia  Cord compression  Diverticulitis  Osteoarthrosis  Ovarian cyst    Past Surgical History:    Appendectomy  Toe arthrodesis, right  Gastric  "bypass  Mediastinoscopy  Rectocele repair  Knee arthroplasty, left  Carpal tunnel release, bilateral  Cholecystectomy  Colonoscopy  Cystocele repair  Cystoscopy with lithotripsy  Cystoscopy with stent removal  Upper GI endoscopy  Hernia repair  Hysterectomy with bilateral salpingo-oophorectomy  Hip arthroplasty, right  Laparotomy with lysis adhesions  Lymph node biopsy  Breast augmentation  Left breast capsulotomy  Hammer toe repair   section  Tubal ligation  Abdominoplasty    Family History:    Father: cancer, substance abuse    Social History:  Presents via EMS.  PCP: Nayeli Castorena    drinks alcohol daily approximately 6-8 drinks.    Physical Exam     Patient Vitals for the past 24 hrs:   BP Temp Temp src Pulse Resp SpO2 Height Weight   21 1610 -- -- -- -- -- 95 % -- --   21 1430 (!) 150/82 -- -- 90 21 -- -- --   21 1400 (!) 177/91 -- -- 73 25 99 % -- --   21 1225 (!) 179/111 98.4  F (36.9  C) Oral 86 26 98 % 1.626 m (5' 4\") 81.6 kg (180 lb)        Physical Exam  General: Awake, alert, pleasant, non-toxic. Coughing frequently  Head:  Scalp is NC/AT  Eyes:  Conjunctiva normal, PERRL  ENT:  The external nose and ears are normal.     The oropharynx is normal and without erythema or tonsillar swelling. Uvula midline, no submandibular swelling, sublingual swelling, trismus.   Neck:  Normal range of motion without rigidity.  CV:  Regular rate and rhythm    Systolic murmur at base.  No rubs or gallups.  Resp:  Coarse breath sounds BL.  No wheezes or stridor.    Mildly tachypnic no retractions or accessory muscle use  Abdomen: Abdomen is soft, no distension, no tenderness, no masses.  MS:  No lower extremity edema or asymmetric calf swelling.  Skin:  Warm and dry, No rash or lesions noted. 2+ peripheral pulses in all extremities  Neuro: Alert and oriented x3.  GCS 15 No gross motor deficits.  No facial asymmetry.  Psych: Awake. Alert. Normal affect. Appropriate " interactions.    Emergency Department Course     ECG  ECG taken at 1256, ECG read at 1300  Sinus rhythm with 1st degree AV block with premature atrial complexes  Low voltage QRS  Inferior infarct, age undetermined  Cannot rule out anterior infarct, age undetermined  Normal ECG/Abnormal ECG  No significant change as compared to prior, dated 4/23/21.  Rate 73 bpm. MO interval 222 ms. QRS duration 64 ms. QT/QTc 438/482 ms. P-R-T axes 71 -23 39.      Imaging:  CT Chest Pulmonary Embolism w Contrast   Final Result   IMPRESSION:   1.  No evidence of pulmonary embolism.   2.  Dilated main pulmonary arteries suggest chronic pulmonary arterial   hypertension.   3.  Ectatic ascending thoracic aorta at 4.1 cm.   4.  Cardiomegaly, but no pleural effusion or evidence of pulmonary   edema.      KELLEY MOORE MD            SYSTEM ID:  [id number]      XR Chest Port 1 View   Final Result   IMPRESSION: Prominent heart size similar to prior. Pulmonary   vascularity within normal limits. No airspace consolidation,   pneumothorax, or pleural effusion.      KELLEY MOORE MD            SYSTEM ID:  [id number]         Report per radiology    Laboratory:  Labs Ordered and Resulted from Time of ED Arrival to Time of ED Departure   COMPREHENSIVE METABOLIC PANEL - Abnormal       Result Value    Sodium 125 (*)     Potassium 3.2 (*)     Chloride 88 (*)     Carbon Dioxide (CO2) 30      Anion Gap 7      Urea Nitrogen 9      Creatinine 0.84      Calcium 9.2      Glucose 128 (*)     Alkaline Phosphatase 134      AST 19      ALT 17      Protein Total 8.4      Albumin 3.7      Bilirubin Total 0.4      GFR Estimate 67     CBC WITH PLATELETS AND DIFFERENTIAL - Abnormal    WBC Count 8.4      RBC Count 3.67 (*)     Hemoglobin 11.0 (*)     Hematocrit 33.0 (*)     MCV 90      MCH 30.0      MCHC 33.3      RDW 13.8      Platelet Count 265      % Neutrophils 74      % Lymphocytes 8      % Monocytes 15      % Eosinophils 1      % Basophils 1      % Immature  Granulocytes 1      NRBCs per 100 WBC 0      Absolute Neutrophils 6.3      Absolute Lymphocytes 0.7 (*)     Absolute Monocytes 1.2      Absolute Eosinophils 0.0      Absolute Basophils 0.1      Absolute Immature Granulocytes 0.1      Absolute NRBCs 0.0     NT PROBNP INPATIENT - Abnormal    N terminal Pro BNP Inpatient 2,896 (*)    BLOOD GAS VENOUS WITH OXYHEMOGLOBIN - Abnormal    pH Venous 7.36      pCO2 Venous 58 (*)     pO2 Venous 34      Bicarbonate Venous 32 (*)     FIO2 3      Oxyhemoglobin Venous 59 (*)     Base Excess/Deficit (+/-) 5.3 (*)    MAGNESIUM - Abnormal    Magnesium 1.5 (*)    INFLUENZA A/B & SARS-COV2 PCR MULTIPLEX - Normal    Influenza A PCR Negative      Influenza B PCR Negative      SARS CoV2 PCR Negative     TROPONIN I - Normal    Troponin I High Sensitivity 16     PROCALCITONIN - Normal    Procalcitonin <0.05     TROPONIN I - Normal    Troponin I High Sensitivity 16     SODIUM RANDOM URINE   OSMOLALITY, RANDOM URINE      Emergency Department Course:  Reviewed:  I reviewed nursing notes, vitals, past medical history, Care Everywhere and [facility]    Assessments:  1315 I obtained history and examined the patient as noted above.   1644 I rechecked the patient and explained findings.  1705 Alerted by nursing that pt mildly tremulous.  Fine tremor on exam noted.  Ativan ordered.    Consults:   1655 I spoke with Dr. Jennifer Macias Hospitalist regarding the patient.    Interventions:  Medications   albuterol (PROVENTIL HFA/VENTOLIN HFA) inhaler (6 puffs Inhalation Given 12/17/21 1404)   Saline flush (92 mLs Intravenous Given 12/17/21 1441)   iopamidol (ISOVUE-370) solution 68 mL (68 mLs Intravenous Given 12/17/21 1441)   ipratropium - albuterol 0.5 mg/2.5 mg/3 mL (DUONEB) neb solution 3 mL (3 mLs Nebulization Given 12/17/21 1645)   ipratropium - albuterol 0.5 mg/2.5 mg/3 mL (DUONEB) neb solution 3 mL (3 mLs Nebulization Given 12/17/21 1645)   potassium chloride 10 mEq in 100 mL sterile water intermittent  infusion (premix) (10 mEq Intravenous New Bag 12/17/21 1644)   furosemide (LASIX) injection 40 mg (40 mg Intravenous Given 12/17/21 1705)   LORazepam (ATIVAN) injection 1 mg (1 mg Intravenous Given 12/17/21 1705)   LORazepam (ATIVAN) injection 1 mg (1 mg Intravenous Given 12/17/21 1816)        Disposition:  The patient was admitted to the hospital under the care of Dr. Macias.     Impression & Plan      Covid-19  Kavitha Headley was evaluated during a global COVID-19 pandemic, which necessitated consideration that the patient might be at risk for infection with the SARS-CoV-2 virus that causes COVID-19.   Applicable protocols for evaluation were followed during the patient's care.   COVID-19 was considered as part of the patient's evaluation. The plan for testing is:  a test was obtained during this visit.     Medical Decision Making:  Very pleasant 76-year-old female with history of CAD, CHF, heavy alcohol use who presents with shortness of breath, cough, myalgias.  Broad differential considered.  Here she is hypoxic into the upper 80s but satting well on 2 L nasal cannula.  No respiratory distress.  She is not hypercapnic no indication for BiPAP or intubation.  Etiology of this is somewhat unclear but likely multifactorial.  Her BNP is somewhat elevated and her lung sounds are coarse but she does not have any edema on CT or peripherally.  She does have cardiomegaly.  Will give a dose of Lasix.  CT also shows some pulmonary artery enlargement suggestive of pulmonary hypertension which could be a contributing factor as well.  She does not have any findings to suggest pneumonia is afebrile with normal white count and procalcitonin.  COVID-19 is negative and she is vaccinated without any findings to suggest this on CT.  No significant pleural or pericardial effusion noted on imaging.  She is not wheezing.  No evidence of upper airway obstruction or anaphylaxis.  Her EKG shows some mildly low voltage and nonspecific  changes but no acute ischemia or arrhythmia and her troponin is negative x2.  Her blood work is notable for hyponatremia likely due to both alcohol use and diuresis, no lung masses to suggest SIADH.  She is a heavy drinker and did develop some signs of withdrawal on exam and was given Ativan with good response.  Will be admitted to the hospital for hypoxic respiratory failure and further evaluation.  Discussed with hospitalist who agrees to except the patient.    Diagnosis:    ICD-10-CM    1. Acute respiratory failure with hypoxia (H)  J96.01    2. Hyponatremia  E87.1    3. Enlarged pulmonary artery (H)  I28.8    4. Elevated brain natriuretic peptide (BNP) level  R79.89    5. Alcohol withdrawal (H)  F10.239         Discharge Medications:  New Prescriptions    No medications on file      Scribe Disclosure:  Lizabeth HUANG, am serving as a scribe at 12:45 PM on 12/17/2021 to document services personally performed by Lucio Welsh PA-C based on my observations and the provider's statements to me.     Lucio Welsh PA-C  12/17/21 4890

## 2021-12-17 NOTE — ED NOTES
Bed: ED29  Expected date: 12/17/21  Expected time: 11:40 AM  Means of arrival: Ambulance  Comments:  515 76f Covid ETA 1200

## 2021-12-17 NOTE — H&P
[facility]    History and Physical - Hospitalist Service       Date of Admission:  12/17/2021  Dictation #: [id number]  Brief Summary (see dictation for more details):     Hypoxic respiratory failure of unclear etiology, possibly CHF  * 1 wk hx of dyspnea, nonproductive cough. Some chills no fever, vague chest pain. +orthopnea. Has been taking Lasix as prescribed, no LE edema. Felt like she couldn't breath today so called EMS. O2 sats 88-89% on RA. ++junky/coarse lungs on admission. +EtOH use but denies passing out/aspirating. Vaccinated in 5/2021 but not yet boosted.  * Afebrile. WBC/procal both normal. COVID/influenza neg. ProBNP 2300. CT chest neg for PE/infiltrate or pulmonary edema.   * Given nebs in ED. Also getting IV Lasix  -- monitor response to Lasix  -- had recent echo in 10/2021 with nl EF -- deferred repeating for now but could consider  -- pulm consult  -- trial of steroids?    Hyponatremia -- monitor with diuresis  Hypokalemia -- replace  +ETOH use (6 drinks/d) -- CIWA protocol w/prn Ativan    FULL CODE    Clinically Significant Risk Factors Present on Admission         # Hyponatremia: Na = 125 mmol/L (Ref range: 133 - 144 mmol/L) on admission, will monitor as appropriate      # Platelet Defect: home medication list includes an antiplatelet medication        DO DANIEL Zepeda [facility]  Securely message with the Vocera Web Console (learn more here)  Text page via [employer] Paging/Directory

## 2021-12-17 NOTE — ED NOTES
"[facility]  ED Nurse Handoff Report    ED Chief complaint: Shortness of Breath and Covid Concern      ED Diagnosis:   Final diagnoses:   Acute respiratory failure with hypoxia (H)   Hyponatremia   Enlarged pulmonary artery (H)   Elevated brain natriuretic peptide (BNP) level   Alcohol withdrawal (H)       Code Status: MD to address    Allergies:   Allergies   Allergen Reactions     Bactrim [Sulfamethoxazole W/Trimethoprim] Hives     Codeine Itching     NAUSEA     Morphine Itching     NAUSEA       Patient Story: A&Ox4. Lives at home. Patient has had increase in SOB and coughing for 1 week. Denies fever. Denies any chronic illnesses. No known Covid-19 exposure.   Focused Assessment:  See above    Treatments and/or interventions provided: IV, meds, imaging, nebs  Patient's response to treatments and/or interventions: improvement    To be done/followed up on inpatient unit:  all inpatient orders    Does this patient have any cognitive concerns?: None    Activity level - Baseline/Home:  Independent  Activity Level - Current:   Stand with Assist    Patient's Preferred language: English   Needed?: No    Isolation: None  Infection: Not Applicable  None  Patient tested for COVID 19 prior to admission: YES  Bariatric?: No    Vital Signs:   Vitals:    12/17/21 1225 12/17/21 1400 12/17/21 1430 12/17/21 1610   BP: (!) 179/111 (!) 177/91 (!) 150/82    Pulse: 86 73 90    Resp: 26 25 21    Temp: 98.4  F (36.9  C)      TempSrc: Oral      SpO2: 98% 99%  95%   Weight: 81.6 kg (180 lb)      Height: 1.626 m (5' 4\")          Cardiac Rhythm:     Was the PSS-3 completed:   Yes  What interventions are required if any?               Family Comments:   OBS brochure/video discussed/provided to patient/family: N/A              Name of person given brochure if not patient:               Relationship to patient:     For the majority of the shift this patient's behavior was Green.   Behavioral interventions performed " were .    ED NURSE PHONE NUMBER: *[phone]

## 2021-12-17 NOTE — PROGRESS NOTES
RECEIVING UNIT ED HANDOFF REVIEW    ED Nurse Handoff Report was reviewed by: Shanta Maldonado RN on December 17, 2021 at 5:57 PM

## 2021-12-17 NOTE — ED PROVIDER NOTES
Emergency Department Attending Supervision Note  12/17/2021  4:50 PM      I evaluated this patient in conjunction with Lucio Welsh PA-C      Briefly, the patient presented with shortness of breath      On my exam, patient is working a little bit harder with her breathing.  She has coarse rhonchi in both lungs.      ED Course: Labs are obtained and her sodium is low at 125, glucose only elevated to 128.  Potassium low at 3.2 but normal renal function normal LFTs.  Her oxygen level was below 90%.  Covid is negative.  Her initial troponin is normal at 16 but her BNP was high at 2896.  She does have a history of a dilated aorta that is ectatic.  CT scan of the chest did not reveal any PE or obvious infiltrate or evidence of Covid or heart failure.  Her white count is normal.  I have had Lucio add a procalcitonin along with a magnesium level because the patient states that she has a history of drinking and is concerned about possible withdrawal.  She was not showing signs of withdrawal when I saw her.  The patient needs to be admitted on oxygen with further evaluation and work-up.  40 mg of Lasix IV is given.  She will likely need a cardiac echo and serial troponins.  If the procalcitonin is elevated, it is possible she could have an occult pneumonia.  Influenza was also negative.  The hospitalist will admit the patient for further work-up.      I agree with Lucio's impression and plan.      Diagnosis:    Acute respiratory failure with hypoxemia  Hyponatremia  Hypokalemia  CHF    MD Mauricio Little Tracy Alan, MD  12/17/21 6125

## 2021-12-17 NOTE — PHARMACY-ADMISSION MEDICATION HISTORY
Pharmacy Medication History  Admission medication history interview status for the 12/17/2021  admission is complete. See EPIC admission navigator for prior to admission medications     Location of Interview: Phone  Medication history sources: Patient and Surescripts    Significant changes made to the medication list:  Added robitussin and apa, recently started taking due to illness  Modified methocarbamol, miralax, hydroxyzine    In the past week, patient estimated taking medication this percent of the time: 50-90% due to ran out of Seroquel XR    Additional medication history information:   none    Medication reconciliation completed by provider prior to medication history? No    Time spent in this activity: 20 minutes    Prior to Admission medications    Medication Sig Last Dose Taking? Auth Provider   acamprosate (CAMPRAL) 333 MG EC tablet Take 2 tablets (666 mg) by mouth 3 times daily 12/16/2021 at pm Yes Jin Ackerman MD   acetaminophen (TYLENOL) 500 MG tablet Take 1,000 mg by mouth 2 times daily as needed for pain Past Week at Unknown time Yes Unknown, Entered By History   aspirin 81 MG EC tablet Take 81 mg by mouth daily 12/16/2021 at Unknown time Yes Unknown, Entered By History   atenolol (TENORMIN) 25 MG tablet Take 1 tablet (25 mg) by mouth daily 12/16/2021 at Unknown time Yes Herman Ugarte MD   citalopram (CELEXA) 20 MG tablet Take 1 tablet (20 mg) by mouth daily 12/16/2021 at Unknown time Yes Nayeli Castorena PA-C   diclofenac (VOLTAREN) 50 MG EC tablet Take 1 tablet (50 mg) by mouth 2 times daily 12/16/2021 at pm Yes Jin Ackerman MD   folic acid (FOLVITE) 1 MG tablet Take 1 mg by mouth daily 12/16/2021 at Unknown time Yes Unknown, Entered By History   furosemide (LASIX) 20 MG tablet TAKE 1 TABLET DAILY 12/16/2021 at Unknown time Yes Herman Ugarte MD   gabapentin (NEURONTIN) 300 MG capsule Take 1 capsule (300 mg) by mouth 3 times daily 12/16/2021 at pm Yes Nayeli Castorena  CLEO Gupta   guaiFENesin-dextromethorphan (ROBITUSSIN DM) 100-10 MG/5ML syrup Take 10 mLs by mouth every 4 hours as needed for cough prn Yes Unknown, Entered By History   hydrOXYzine (ATARAX) 25 MG tablet TAKE 1 TABLET EVERY 6 HOURS AS NEEDED FOR ITCHING  Patient taking differently: Take 25 mg by mouth every 6 hours as needed for anxiety  prn Yes Nayeli Castorena PA-C   losartan (COZAAR) 25 MG tablet Take 1 tablet (25 mg) by mouth daily 12/16/2021 at Unknown time Yes Herman Ugarte MD   methocarbamol (ROBAXIN) 500 MG tablet TAKE 1 TABLET BY MOUTH THREE TIMES A DAY  Patient taking differently: Take 500 mg by mouth 2 times daily  12/16/2021 at pm Yes Nayeli Castorena PA-C   mirtazapine (REMERON) 15 MG tablet Take 1 tablet (15 mg) by mouth At Bedtime 12/16/2021 at Unknown time Yes Jin Ackerman MD   Multiple Vitamins-Minerals (CENTRUM SILVER) per tablet Take 1 tablet by mouth daily 12/16/2021 at Unknown time Yes Reported, Patient   polyethylene glycol (MIRALAX) 17 GM/Dose powder Take 17 g by mouth 2 times daily as needed for constipation prn Yes Unknown, Entered By History   pravastatin (PRAVACHOL) 20 MG tablet Take 1 tablet (20 mg) by mouth daily 12/16/2021 at am Yes Herman Ugarte MD   QUEtiapine (SEROQUEL XR) 150 MG TB24 24 hr tablet Take 1 tablet (150 mg) by mouth At Bedtime Past Week at Unknown time Yes Jin Ackerman MD   QUEtiapine (SEROQUEL) 50 MG tablet Take 1 tablet (50 mg) by mouth nightly as needed (Anxiety, insomnia, hallucinations) prn Yes Jin Ackerman MD   senna-docusate (SENOKOT-S/PERICOLACE) 8.6-50 MG tablet Take 1 tablet by mouth 2 times daily 12/16/2021 at pm Yes Misael Robles MD   thiamine (B-1) 100 MG tablet Take 1 tablet (100 mg) by mouth daily 12/16/2021 at Unknown time Yes Misael Robles MD   traZODone (DESYREL) 100 MG tablet TAKE 1 TABLET NIGHTLY AS NEEDED FOR SLEEP prn Yes Nayeli Castorena PA-C       The information  provided in this note is only as accurate as the sources available at the time of update(s)

## 2021-12-18 LAB
ANION GAP SERPL CALCULATED.3IONS-SCNC: 4 MMOL/L (ref 3–14)
ANION GAP SERPL CALCULATED.3IONS-SCNC: 4 MMOL/L (ref 3–14)
BASE EXCESS BLDV CALC-SCNC: 5.6 MMOL/L (ref -7.7–1.9)
BUN SERPL-MCNC: 12 MG/DL (ref 7–30)
BUN SERPL-MCNC: 13 MG/DL (ref 7–30)
CALCIUM SERPL-MCNC: 8.4 MG/DL (ref 8.5–10.1)
CALCIUM SERPL-MCNC: 8.5 MG/DL (ref 8.5–10.1)
CHLORIDE BLD-SCNC: 94 MMOL/L (ref 94–109)
CHLORIDE BLD-SCNC: 94 MMOL/L (ref 94–109)
CO2 SERPL-SCNC: 31 MMOL/L (ref 20–32)
CO2 SERPL-SCNC: 31 MMOL/L (ref 20–32)
CREAT SERPL-MCNC: 0.81 MG/DL (ref 0.52–1.04)
CREAT SERPL-MCNC: 0.9 MG/DL (ref 0.52–1.04)
ERYTHROCYTE [DISTWIDTH] IN BLOOD BY AUTOMATED COUNT: 14.1 % (ref 10–15)
GFR SERPL CREATININE-BSD FRML MDRD: 61 ML/MIN/1.73M2
GFR SERPL CREATININE-BSD FRML MDRD: 70 ML/MIN/1.73M2
GLUCOSE BLD-MCNC: 110 MG/DL (ref 70–99)
GLUCOSE BLD-MCNC: 111 MG/DL (ref 70–99)
HCO3 BLDV-SCNC: 32 MMOL/L (ref 21–28)
HCT VFR BLD AUTO: 30 % (ref 35–47)
HGB BLD-MCNC: 10.1 G/DL (ref 11.7–15.7)
MAGNESIUM SERPL-MCNC: 1.7 MG/DL (ref 1.6–2.3)
MCH RBC QN AUTO: 30 PG (ref 26.5–33)
MCHC RBC AUTO-ENTMCNC: 33.7 G/DL (ref 31.5–36.5)
MCV RBC AUTO: 89 FL (ref 78–100)
O2/TOTAL GAS SETTING VFR VENT: 35 %
OSMOLALITY UR: 219 MMOL/KG (ref 100–1200)
PCO2 BLDV: 56 MM HG (ref 40–50)
PH BLDV: 7.37 [PH] (ref 7.32–7.43)
PHOSPHATE SERPL-MCNC: 3.1 MG/DL (ref 2.5–4.5)
PLATELET # BLD AUTO: 229 10E3/UL (ref 150–450)
PO2 BLDV: 56 MM HG (ref 25–47)
POTASSIUM BLD-SCNC: 4.1 MMOL/L (ref 3.4–5.3)
POTASSIUM BLD-SCNC: 4.4 MMOL/L (ref 3.4–5.3)
POTASSIUM BLD-SCNC: 4.4 MMOL/L (ref 3.4–5.3)
RBC # BLD AUTO: 3.37 10E6/UL (ref 3.8–5.2)
SODIUM SERPL-SCNC: 128 MMOL/L (ref 133–144)
SODIUM SERPL-SCNC: 129 MMOL/L (ref 133–144)
SODIUM SERPL-SCNC: 129 MMOL/L (ref 133–144)
SODIUM SERPL-SCNC: 130 MMOL/L (ref 133–144)
SODIUM UR-SCNC: 13 MMOL/L
VIT B12 SERPL-MCNC: 843 PG/ML (ref 193–986)
WBC # BLD AUTO: 7.3 10E3/UL (ref 4–11)

## 2021-12-18 PROCEDURE — 84295 ASSAY OF SERUM SODIUM: CPT | Performed by: INTERNAL MEDICINE

## 2021-12-18 PROCEDURE — 82803 BLOOD GASES ANY COMBINATION: CPT | Performed by: INTERNAL MEDICINE

## 2021-12-18 PROCEDURE — 94640 AIRWAY INHALATION TREATMENT: CPT | Mod: 76

## 2021-12-18 PROCEDURE — 999N000157 HC STATISTIC RCP TIME EA 10 MIN

## 2021-12-18 PROCEDURE — 84300 ASSAY OF URINE SODIUM: CPT | Performed by: INTERNAL MEDICINE

## 2021-12-18 PROCEDURE — 250N000011 HC RX IP 250 OP 636: Performed by: INTERNAL MEDICINE

## 2021-12-18 PROCEDURE — 83935 ASSAY OF URINE OSMOLALITY: CPT | Performed by: INTERNAL MEDICINE

## 2021-12-18 PROCEDURE — 36415 COLL VENOUS BLD VENIPUNCTURE: CPT | Performed by: INTERNAL MEDICINE

## 2021-12-18 PROCEDURE — 99233 SBSQ HOSP IP/OBS HIGH 50: CPT | Performed by: INTERNAL MEDICINE

## 2021-12-18 PROCEDURE — 94640 AIRWAY INHALATION TREATMENT: CPT

## 2021-12-18 PROCEDURE — 85027 COMPLETE CBC AUTOMATED: CPT | Performed by: INTERNAL MEDICINE

## 2021-12-18 PROCEDURE — 83735 ASSAY OF MAGNESIUM: CPT | Performed by: INTERNAL MEDICINE

## 2021-12-18 PROCEDURE — 82607 VITAMIN B-12: CPT | Performed by: INTERNAL MEDICINE

## 2021-12-18 PROCEDURE — 80048 BASIC METABOLIC PNL TOTAL CA: CPT | Performed by: INTERNAL MEDICINE

## 2021-12-18 PROCEDURE — 120N000001 HC R&B MED SURG/OB

## 2021-12-18 PROCEDURE — 250N000013 HC RX MED GY IP 250 OP 250 PS 637: Performed by: INTERNAL MEDICINE

## 2021-12-18 PROCEDURE — 250N000009 HC RX 250: Performed by: INTERNAL MEDICINE

## 2021-12-18 PROCEDURE — 82310 ASSAY OF CALCIUM: CPT | Performed by: INTERNAL MEDICINE

## 2021-12-18 PROCEDURE — 250N000013 HC RX MED GY IP 250 OP 250 PS 637: Performed by: NURSE PRACTITIONER

## 2021-12-18 PROCEDURE — 84100 ASSAY OF PHOSPHORUS: CPT | Performed by: INTERNAL MEDICINE

## 2021-12-18 RX ORDER — PREDNISONE 20 MG/1
40 TABLET ORAL 2 TIMES DAILY WITH MEALS
Status: DISCONTINUED | OUTPATIENT
Start: 2021-12-18 | End: 2021-12-18

## 2021-12-18 RX ORDER — AZITHROMYCIN 250 MG/1
500 TABLET, FILM COATED ORAL DAILY
Status: COMPLETED | OUTPATIENT
Start: 2021-12-18 | End: 2021-12-20

## 2021-12-18 RX ORDER — METHYLPREDNISOLONE SODIUM SUCCINATE 125 MG/2ML
125 INJECTION, POWDER, LYOPHILIZED, FOR SOLUTION INTRAMUSCULAR; INTRAVENOUS ONCE
Status: COMPLETED | OUTPATIENT
Start: 2021-12-18 | End: 2021-12-18

## 2021-12-18 RX ORDER — IPRATROPIUM BROMIDE AND ALBUTEROL SULFATE 2.5; .5 MG/3ML; MG/3ML
3 SOLUTION RESPIRATORY (INHALATION) ONCE
Status: COMPLETED | OUTPATIENT
Start: 2021-12-18 | End: 2021-12-18

## 2021-12-18 RX ORDER — PREDNISONE 20 MG/1
40 TABLET ORAL 2 TIMES DAILY WITH MEALS
Status: DISCONTINUED | OUTPATIENT
Start: 2021-12-19 | End: 2021-12-22

## 2021-12-18 RX ORDER — IPRATROPIUM BROMIDE AND ALBUTEROL SULFATE 2.5; .5 MG/3ML; MG/3ML
3 SOLUTION RESPIRATORY (INHALATION) EVERY 4 HOURS
Status: DISCONTINUED | OUTPATIENT
Start: 2021-12-18 | End: 2021-12-27 | Stop reason: HOSPADM

## 2021-12-18 RX ORDER — CARBOXYMETHYLCELLULOSE SODIUM 5 MG/ML
1 SOLUTION/ DROPS OPHTHALMIC
Status: DISCONTINUED | OUTPATIENT
Start: 2021-12-18 | End: 2021-12-27 | Stop reason: HOSPADM

## 2021-12-18 RX ORDER — FUROSEMIDE 10 MG/ML
20 INJECTION INTRAMUSCULAR; INTRAVENOUS ONCE
Status: COMPLETED | OUTPATIENT
Start: 2021-12-18 | End: 2021-12-18

## 2021-12-18 RX ORDER — POTASSIUM CHLORIDE 1500 MG/1
20 TABLET, EXTENDED RELEASE ORAL ONCE
Status: COMPLETED | OUTPATIENT
Start: 2021-12-18 | End: 2021-12-18

## 2021-12-18 RX ADMIN — MIRTAZAPINE 15 MG: 15 TABLET, FILM COATED ORAL at 21:18

## 2021-12-18 RX ADMIN — IPRATROPIUM BROMIDE AND ALBUTEROL SULFATE 3 ML: .5; 3 SOLUTION RESPIRATORY (INHALATION) at 11:43

## 2021-12-18 RX ADMIN — IPRATROPIUM BROMIDE AND ALBUTEROL SULFATE 3 ML: .5; 3 SOLUTION RESPIRATORY (INHALATION) at 11:46

## 2021-12-18 RX ADMIN — LOSARTAN POTASSIUM 25 MG: 25 TABLET, FILM COATED ORAL at 08:48

## 2021-12-18 RX ADMIN — GABAPENTIN 300 MG: 300 CAPSULE ORAL at 08:48

## 2021-12-18 RX ADMIN — FOLIC ACID 1 MG: 1 TABLET ORAL at 08:48

## 2021-12-18 RX ADMIN — QUETIAPINE FUMARATE 150 MG: 150 TABLET, EXTENDED RELEASE ORAL at 21:18

## 2021-12-18 RX ADMIN — POLYETHYLENE GLYCOL 3350 17 G: 17 POWDER, FOR SOLUTION ORAL at 21:47

## 2021-12-18 RX ADMIN — POTASSIUM CHLORIDE 20 MEQ: 1500 TABLET, EXTENDED RELEASE ORAL at 16:25

## 2021-12-18 RX ADMIN — PRAVASTATIN SODIUM 20 MG: 20 TABLET ORAL at 08:48

## 2021-12-18 RX ADMIN — BISACODYL 5 MG: 5 TABLET, COATED ORAL at 21:19

## 2021-12-18 RX ADMIN — LORAZEPAM 1 MG: 1 TABLET ORAL at 09:57

## 2021-12-18 RX ADMIN — MULTIPLE VITAMINS W/ MINERALS TAB 1 TABLET: TAB at 08:48

## 2021-12-18 RX ADMIN — ACETAMINOPHEN 650 MG: 325 TABLET, FILM COATED ORAL at 08:51

## 2021-12-18 RX ADMIN — THIAMINE HCL TAB 100 MG 100 MG: 100 TAB at 08:48

## 2021-12-18 RX ADMIN — TRAZODONE HYDROCHLORIDE 100 MG: 100 TABLET ORAL at 00:54

## 2021-12-18 RX ADMIN — AZITHROMYCIN MONOHYDRATE 500 MG: 250 TABLET ORAL at 11:27

## 2021-12-18 RX ADMIN — QUETIAPINE FUMARATE 50 MG: 25 TABLET ORAL at 00:54

## 2021-12-18 RX ADMIN — OLANZAPINE 10 MG: 5 TABLET, ORALLY DISINTEGRATING ORAL at 10:02

## 2021-12-18 RX ADMIN — IPRATROPIUM BROMIDE AND ALBUTEROL SULFATE 3 ML: .5; 3 SOLUTION RESPIRATORY (INHALATION) at 08:06

## 2021-12-18 RX ADMIN — ASPIRIN 81 MG: 81 TABLET, COATED ORAL at 08:48

## 2021-12-18 RX ADMIN — IPRATROPIUM BROMIDE AND ALBUTEROL SULFATE 3 ML: .5; 3 SOLUTION RESPIRATORY (INHALATION) at 15:51

## 2021-12-18 RX ADMIN — GABAPENTIN 300 MG: 300 CAPSULE ORAL at 21:18

## 2021-12-18 RX ADMIN — GABAPENTIN 300 MG: 300 CAPSULE ORAL at 16:25

## 2021-12-18 RX ADMIN — ATENOLOL 25 MG: 25 TABLET ORAL at 08:48

## 2021-12-18 RX ADMIN — METHYLPREDNISOLONE SODIUM SUCCINATE 125 MG: 125 INJECTION, POWDER, FOR SOLUTION INTRAMUSCULAR; INTRAVENOUS at 11:26

## 2021-12-18 RX ADMIN — FUROSEMIDE 20 MG: 10 INJECTION, SOLUTION INTRAVENOUS at 16:24

## 2021-12-18 RX ADMIN — IPRATROPIUM BROMIDE AND ALBUTEROL SULFATE 3 ML: .5; 3 SOLUTION RESPIRATORY (INHALATION) at 20:53

## 2021-12-18 RX ADMIN — MELATONIN 5 MG TABLET 5 MG: at 21:18

## 2021-12-18 ASSESSMENT — ACTIVITIES OF DAILY LIVING (ADL)
ADLS_ACUITY_SCORE: 8
ADLS_ACUITY_SCORE: 11
ADLS_ACUITY_SCORE: 8
ADLS_ACUITY_SCORE: 11
ADLS_ACUITY_SCORE: 8
ADLS_ACUITY_SCORE: 11
ADLS_ACUITY_SCORE: 8
ADLS_ACUITY_SCORE: 10
ADLS_ACUITY_SCORE: 8
ADLS_ACUITY_SCORE: 11
ADLS_ACUITY_SCORE: 8
ADLS_ACUITY_SCORE: 11
ADLS_ACUITY_SCORE: 8

## 2021-12-18 ASSESSMENT — MIFFLIN-ST. JEOR: SCORE: 1313.23

## 2021-12-18 NOTE — PROVIDER NOTIFICATION
MD Notification    Notified Person: MD    Notified Person Name: Dr Marquez    Notification Date/Time:12/18/21 8154    Notification Interaction: web page    Purpose of Notification: Patient had neb treatment & still with increased wheezing, respritory therapist placed her on bipap, need orders for bipap    Orders Received:    Comments:

## 2021-12-18 NOTE — CONSULTS
PULMONOLOGY CONSULTATION  Date of service: 12/18/2021    [facility]    _____________________________________________________    Kavitha Headley  76 year old female  [id number]  [address] [facility] [address]    Primary Care Provider:  Nayeli Castorena  Admission Date: 12/17/2021  Hospital Attending Physician:  Nan Macias*  ________________________________________    CHIEF COMPLAINT : I was asked to see this patient by  for evaluation of hypoxemia and bronchospasm.  Informant: EHR and patient    HISTORY OF PRESENT ILLNESS     Kavitha Headley is a 76 year old female with past medical history significant for Aortic stenosis, depression, anxiety, chronic pain syndrome, gastroesophageal reflux, psoriasis nephrolithiasis and ascending aortic dilatation and hyperlipidemia who presented to the emergency room with 1 week history of a cold, during which she developed progressive shortness of breath and wheezing with associated cough which is primarily nonproductive, but occasionally productive of small amount of clear to brown sputum.  She notes that her boyfriend became ill with a cold after she did, and her daughter also had a similar cold which lasted approximately 5 days.  She denies hemoptysis, chest pain, fever chills or emesis.    Past respiratory history is lifelong non-smoker.  No history of asthma.  No family history of asthma.      CURRENT RESPIRATORY SYMPTOMS:  Sinus congestion/drainage/pain: Denies  Cough: Present  Sputum: Clear to brown  Wheeze: Persistent and worsening  Dyspnea: Present  Chest Discomfort: Denies  Paroxysmal nocturnal dyspnea/Orthopnea: Denies      HOME MEDICATIONS   Pulmonary meds: None  Home Oxygen: None  Medications Prior to Admission   Medication Sig Dispense Refill Last Dose     acamprosate (CAMPRAL) 333 MG EC tablet Take 2 tablets (666 mg) by mouth 3 times daily 180 tablet 3 12/16/2021 at pm     acetaminophen (TYLENOL) 500 MG  tablet Take 1,000 mg by mouth 2 times daily as needed for pain   Past Week at Unknown time     aspirin 81 MG EC tablet Take 81 mg by mouth daily   12/16/2021 at Unknown time     atenolol (TENORMIN) 25 MG tablet Take 1 tablet (25 mg) by mouth daily 100 tablet 3 12/16/2021 at Unknown time     citalopram (CELEXA) 20 MG tablet Take 1 tablet (20 mg) by mouth daily 90 tablet 0 12/16/2021 at Unknown time     diclofenac (VOLTAREN) 50 MG EC tablet Take 1 tablet (50 mg) by mouth 2 times daily 60 tablet 1 12/16/2021 at pm     folic acid (FOLVITE) 1 MG tablet Take 1 mg by mouth daily   12/16/2021 at Unknown time     furosemide (LASIX) 20 MG tablet TAKE 1 TABLET DAILY 100 tablet 4 12/16/2021 at Unknown time     gabapentin (NEURONTIN) 300 MG capsule Take 1 capsule (300 mg) by mouth 3 times daily 270 capsule 1 12/16/2021 at pm     guaiFENesin-dextromethorphan (ROBITUSSIN DM) 100-10 MG/5ML syrup Take 10 mLs by mouth every 4 hours as needed for cough   prn     hydrOXYzine (ATARAX) 25 MG tablet TAKE 1 TABLET EVERY 6 HOURS AS NEEDED FOR ITCHING (Patient taking differently: Take 25 mg by mouth every 6 hours as needed for anxiety ) 90 tablet 3 prn     losartan (COZAAR) 25 MG tablet Take 1 tablet (25 mg) by mouth daily 100 tablet 3 12/16/2021 at Unknown time     methocarbamol (ROBAXIN) 500 MG tablet TAKE 1 TABLET BY MOUTH THREE TIMES A DAY (Patient taking differently: Take 500 mg by mouth 2 times daily ) 90 tablet 1 12/16/2021 at pm     mirtazapine (REMERON) 15 MG tablet Take 1 tablet (15 mg) by mouth At Bedtime 30 tablet 3 12/16/2021 at Unknown time     Multiple Vitamins-Minerals (CENTRUM SILVER) per tablet Take 1 tablet by mouth daily   12/16/2021 at Unknown time     polyethylene glycol (MIRALAX) 17 GM/Dose powder Take 17 g by mouth 2 times daily as needed for constipation   prn     pravastatin (PRAVACHOL) 20 MG tablet Take 1 tablet (20 mg) by mouth daily 100 tablet 4 12/16/2021 at am     QUEtiapine (SEROQUEL XR) 150 MG TB24 24 hr  tablet Take 1 tablet (150 mg) by mouth At Bedtime 30 tablet 1 Past Week at Unknown time     QUEtiapine (SEROQUEL) 50 MG tablet Take 1 tablet (50 mg) by mouth nightly as needed (Anxiety, insomnia, hallucinations) 30 tablet 8 prn     senna-docusate (SENOKOT-S/PERICOLACE) 8.6-50 MG tablet Take 1 tablet by mouth 2 times daily   12/16/2021 at pm     thiamine (B-1) 100 MG tablet Take 1 tablet (100 mg) by mouth daily   12/16/2021 at Unknown time     traZODone (DESYREL) 100 MG tablet TAKE 1 TABLET NIGHTLY AS NEEDED FOR SLEEP 90 tablet 2 prn       PAST MEDICAL HISTORY      Past Medical History:   Diagnosis Date     Alcohol abuse     Long term alcohol abuse. Abstinenet since October 2019.     Anxiety disorder      Ascending aorta dilatation (H)      Bariatric surgery status 1996?    gastric bypass, [facility] and     Benign hypertension      Chronic insomnia      Chronic pain syndrome     Chronic back and neck pain, chronic pain due to osteoarthritis multiple joints     Coronary artery disease involving native coronary artery of native heart without angina pectoris 10/16/2018    Minimal coronary artery disease on coronary angiogram in 2015.      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     Major depressive disorder, single episode, severe, without mention of psychotic behavior      Mixed hyperlipidemia      Moderate aortic stenosis 05/2014    moderate to severe aortic valve stenosis     Pelvic relaxation disorder     Surgical intervention for cystocele/rectocele 3,11/2012     Personal history of urinary calculi 6/2006    left ureteral stone,lithotripsy     Psoriasis      PVC (premature ventricular contraction)      Spinal stenosis      Stage III chronic kidney disease (H) 2005      SVT (supraventricular tachycardia) (H)     likely atrial tachycardia     Past Surgical History:   Procedure Laterality Date     APPENDECTOMY  3/2004    incidental     ARTHRODESIS TOE(S) Right 1/31/2020    Procedure: RIGHT FIRST METATARSAL PHALANGEAL JOINT ARTHRODESIS;  Surgeon: Steven Reyes MD;  Location:  OR     C GASTRIC BYPASS,OBESE<100CM ARIANNA-EN-Y  1996     C MEDIASTINOSCOPY W OR WO BIOPSY  2/2008    Videomediastinoscopy and, for mediastinal adenopathy -reactive lymphoid hyperplasia     C REPAIR OF RECTOCELE  3/2012     C TOTAL KNEE ARTHROPLASTY  12/2005    left      CARPAL TUNNEL RELEASE RT/LT  10/2010    Carpometacarpal excisional arthroplasty with a fascial autograft and APL suspension sling (32578). 2. Left thumb metacarpophalangeal joint fusion with autologous bone graft (71452). 3. Left endoscopic carpal tunnel release      CHOLECYSTECTOMY, LAPOROSCOPIC  11/2010    Cholecystectomy, Laparoscopic     COLONOSCOPY N/A 9/8/2016    Procedure: COMBINED COLONOSCOPY, SINGLE OR MULTIPLE BIOPSY/POLYPECTOMY BY BIOPSY;  Surgeon: Moe Barlow MD;  Location:  GI     CYSTOCELE REPAIR  11/2012    davinc laparoscopic sacrocolpopexy, enterocele repair, lysis of adhesions, placement of retropubic mid urethral sling, cystoscopy     CYSTOSCOPY, LITHOTRIPSY, COMBINED  6/2006    Left extracorporeal shock wave lithotripsy, cystoscopy, left ureteral stent placement.     CYSTOSCOPY, REMOVE STENT(S), COMBINED  7/2006    Cystoscopy, removal of left ureteral stent, retrograde pyelography, flexible and rigid ureteroscopy and holmium laser lithotripsy, basket removal of stone fragments, ureteral stent placement.      ENDOSCOPIC ULTRASOUND UPPER GASTROINTESTINAL TRACT (GI) N/A 6/12/2017    Procedure: ENDOSCOPIC ULTRASOUND, ESOPHAGOSCOPY / UPPER GASTROINTESTINAL TRACT (GI);  ENDOSCOPIC ULTRASOUND, ESOPHAGOSCOPY / UPPER GASTROINTESTINAL TRACT (GI);  Surgeon: Parth Graham MD;  Location:  GI     HERNIA REPAIR   4/2012    bilateral augmentation mastopexy, ventral hernia repair, and medial thigh liposuction on 04/06/2012.      HYSTERECTOMY VAGINAL, BILATERAL SALPINGO-OOPHERECTOMY, COMBINED  1998    due to myoma and bleeding     JOINT REPLACEMENT, HIP RT/LT  4/2004    right total hip arthroplasty     LAPAROTOMY, LYSIS ADHESIONS, COMBINED  3/2004    lysis adhesions, ventral hernia repair, appendectomy incidentally     LYMPH NODE BIOPSY  4/2008    right axillary, reactive follicular and paracortical hyperplasia.     MAMMOPLASTY AUGMENTATION BILATERAL  4/2012     REPAIR HAMMER TOE Right 1/31/2020    Procedure: WITH SECOND AND THIRD CLAW TOE RECONSTRUCTION;  Surgeon: Steven Reyes MD;  Location: [facility] OR     REVISE RECONSTRUCTED BREAST  6/7/2012    Left breast capsulotomy.        ALLERGIES     Allergies   Allergen Reactions     Bactrim [Sulfamethoxazole W/Trimethoprim] Hives     Codeine Itching     NAUSEA     Morphine Itching     NAUSEA       SOCIAL / SUBSTANCE HISTORY     Social History     Socioeconomic History     Marital status:      Spouse name: Mt     Number of children: 4     Years of education: 18     Highest education level: Not on file   Occupational History     Occupation: nurse     Employer: [employer]     Employer: RETIRED   Tobacco Use     Smoking status: Never Smoker     Smokeless tobacco: Never Used   Substance and Sexual Activity     Alcohol use: Not Currently     Alcohol/week: 63.0 standard drinks     Types: 63 Standard drinks or equivalent per week     Comment: March 23rd Sobriety date     Drug use: No     Sexual activity: Yes     Partners: Male   Other Topics Concern      Service Not Asked     Blood Transfusions No     Caffeine Concern Yes     Comment: 1-2 cups per day      Occupational Exposure Yes     Comment: blood     Hobby Hazards No     Sleep Concern Yes     Stress Concern Yes     Weight Concern Yes     Comment: gastric  byepass     Special Diet No     Back Care No     Exercise Yes      "Comment: walk, swin     Bike Helmet No     Seat Belt Yes     Self-Exams Yes     Parent/sibling w/ CABG, MI or angioplasty before 65F 55M? Not Asked   Social History Narrative     Not on file     Social Determinants of Health     Financial Resource Strain: Not on file   Food Insecurity: Not on file   Transportation Needs: Not on file   Physical Activity: Not on file   Stress: Not on file   Social Connections: Not on file   Intimate Partner Violence: Not on file   Housing Stability: Not on file       FAMILY HISTORY     Family History   Problem Relation Age of Onset     Substance Abuse Father      Cancer Father         throat and lung mets     Diabetes No family hx of      Coronary Artery Disease No family hx of      Cerebrovascular Disease No family hx of        REVIEW OF SYSTEMS   A comprehensive review of systems was negative except for items noted in HPI/Subjective.    PHYSICAL EXAMINATION   Temp (24hrs), Av.3  F (36.8  C), Min:98  F (36.7  C), Max:98.5  F (36.9  C)    Vital signs:  Temp: 98.5  F (36.9  C) Temp src: Oral BP: (!) 140/89 Pulse: 75   Resp: 20 SpO2: 96 % O2 Device: Nasal cannula Oxygen Delivery: 1 LPM Height: 162.6 cm (5' 4\") Weight: 84.8 kg (187 lb)  Estimated body mass index is 32.1 kg/m  as calculated from the following:    Height as of this encounter: 1.626 m (5' 4\").    Weight as of this encounter: 84.8 kg (187 lb).        I/O last 3 completed shifts:  In: 240 [P.O.:240]  Out: 1050 [Urine:1050]    CONSTITUTIONAL/GENERAL APPEARANCE: Alert  female. No Apparent Distress.  PSYCHIATRIC: Pleasant and appropriate mood and affect. Oriented x 3.  EARS, NOSE,THROAT,MOUTH: External ears and nose overall normal.  Moist oral mucosa.   NECK: Neck appearance normal. No neck masses and the thyroid not enlarged.   RESPIRATORY: Non-labored effort.  Diffuse expiratory wheezes heard throughout all lung fields.  No crackles appreciated.  CARDIOVASCULAR: S1, S2, regular rate and rhythm, systolic ejection murmur " grade 3 out of 6 murmur. Extremities with no edema.  ABDOMEN: round, soft, non-tender, non-distended, no palpable masses. No presence of hernia.  LYMPHATIC: no cervical or axillary lymphadenopathy.  SKIN: Skin color was normal. No clubbing or cyanosis. No palpable skin abnormalities.    LABORATORY ASSESSMENT    Arterial Blood Gas  Recent Labs   Lab 12/17/21  1312   O2PER 3     CBC  Recent Labs   Lab 12/18/21  0340 12/17/21  1250   WBC 7.3 8.4   RBC 3.37* 3.67*   HGB 10.1* 11.0*   HCT 30.0* 33.0*   MCV 89 90   MCH 30.0 30.0   MCHC 33.7 33.3   RDW 14.1 13.8    265     BMP  Recent Labs   Lab 12/18/21  0758 12/18/21  0340 12/17/21  2140 12/17/21  2019 12/17/21  1402   * 129*  --   --  125*   POTASSIUM 4.1 4.4  4.4 3.4 3.2* 3.2*   CHLORIDE 94 94  --   --  88*   BIRGIT 8.4* 8.5  --   --  9.2   CO2 31 31  --   --  30   BUN 12 13  --   --  9   CR 0.90 0.81  --   --  0.84   * 111*  --   --  128*     INRNo lab results found in last 7 days.   BNPNo lab results found in last 7 days.  VENOUS BLOOD GASES  Recent Labs   Lab 12/17/21  1312   PHV 7.36   PCO2V 58*   PO2V 34   HCO3V 32*   MOSHE 5.3*         Additional labs and/or comments:     IMAGING    CT CHEST PULMONARY EMBOLISM WITH CONTRAST 12/17/2021 3:45 PM     CLINICAL HISTORY: PE suspected, high probability. Cough, hypoxia,  clear x-ray.     TECHNIQUE: CT angiogram chest during arterial phase injection IV  contrast. 2D and 3D MIP reconstructions were performed by the CT  technologist. Dose reduction techniques were used.      CONTRAST: 68mL [id number]     COMPARISON: 2/13/2020.     FINDINGS:  ANGIOGRAM CHEST: Main pulmonary artery is dilated at 3.9 cm. No  evidence of pulmonary embolism. The ascending thoracic aorta is  dilated at 4.1 cm. No evidence of aortic dissection.     LUNGS AND PLEURA: No airspace consolidation or pleural effusion.  Incidental calcified granuloma in the right upper lobe.     MEDIASTINUM/AXILLAE: The heart is enlarged. No thoracic or  axillary  lymphadenopathy.     CORONARY ARTERY CALCIFICATION: Moderate.     UPPER ABDOMEN: Previous gastric bypass surgery.     MUSCULOSKELETAL: No destructive bone lesions.                                                                      IMPRESSION:  1.  No evidence of pulmonary embolism.  2.  Dilated main pulmonary arteries suggest chronic pulmonary arterial  hypertension.  3.  Ectatic ascending thoracic aorta at 4.1 cm.  4.  Cardiomegaly, but no pleural effusion or evidence of pulmonary  edema.      PFT & OTHER TESTING       ASSESSMENT / PLAN      Pulmonary diagnoses:  Bronchitis acute  Cough R05  Hypoxemia R09.02  SOB R06.02  Bronchospasm    Additional COVID-19 diagnoses:  Concern of possible exposure to COVID-19, Now RULED OUT Z03.818      ASSESSMENT : 76-year-old woman, lifelong non-smoker, history of moderate aortic stenosis presents with increasing cough and wheeze following a cold which developed approximately a week ago.  This is accompanied by hypoxemia requiring supplemental oxygen.  CT scan of the chest is unremarkable and negative for pulmonary embolism.    I think this likely represents asthma with bronchitis, likely viral in nature, cannot rule out bacterial but this seems less likely.      PLAN:     Agree with Solu-Medrol 125x1    Add prednisone 40 mg twice daily    Azithromycin 500 mg daily for 3 days.    Continue oxygen, titrating to maintain O2 saturations greater than 92%.    Continue albuterol/ipratropium nebs    Discussed briefly with Dr. Marquez.      Noé Ulrich M.D.  [facility]  [facility]  [phone]  Pager [phone]  =

## 2021-12-18 NOTE — PROGRESS NOTES
Patient taken off BIPAP at 1400, placed on 2 liters sats maintain upper 90's. Continues with insp/exp wheezing & SOB, nebs given, lasix given after urine sample sent. CIWA throughout shift 6/9/5/4 ativan given X1 for anxiety mainly, Tele SR with 1st AVB/PAC

## 2021-12-18 NOTE — UTILIZATION REVIEW
"  Admission Status; Secondary Review Determination     Admission Date: 12/17/2021 12:18 PM      Under the authority of the Utilization Management Committee, the utilization review process indicated a secondary review on the above patient.  The review outcome is based on review of the medical records, discussions with staff, and applying clinical experience noted on the date of the review.        (x)      Inpatient Status Appropriate - This patient's medical care is consistent with medical management for inpatient care and reasonable inpatient medical practice.      () Observation Status Appropriate - This patient does not meet hospital inpatient criteria and is placed in observation status. If this patient's primary payer is Medicare and was admitted as an inpatient, Condition Code 44 should be used and patient status changed to \"observation\".   () Admission Status NOT Appropriate - This patient's medical care is not consistent with medical management for Inpatient or Observation Status.          RATIONALE FOR DETERMINATION   Kavitha Headley is a very pleasant 76-year-old female with a past medical history significant for hyperlipidemia, aortic stenosis, depression, anxiety, chronic pain syndrome, GERD, psoriasis, nephrolithiasis and ascending aortic dilation.  She presented to the emergency room with shortness of breath.  Found to have hyponatremia, hypokalemia, and to be in alcohol withdrawals.  She has required multiple doses of lorazepam to treat alcohol withdrawals.  She is on continuous supplemental oxygen.  Has required multiple duo nebs to treat bronchospasm.  She is expected to require a prolonged hospital stay.  Due to this patient's advanced age, complex past medical history, acute alcohol withdrawals requiring multiple doses of lorazepam, need for continuous supplemental oxygen, and expectation of a prolonged hospital stay, it is appropriate to have her admitted to the hospital as an inpatient.      The " severity of illness, intensity of service provided, expected LOS and risk for adverse outcome make the care complex, high risk and appropriate for hospital admission.        The information on this document is developed by the utilization review team in order for the business office to ensure compliance.  This only denotes the appropriateness of proper admission status and does not reflect the quality of care rendered.         The definitions of Inpatient Status and Observation Status used in making the determination above are those provided in the CMS Coverage Manual, Chapter 1 and Chapter 6, section 70.4.      Sincerely,     Dav Vogel D.O.  Utilization Review/ Case Management  [facility].

## 2021-12-18 NOTE — PROGRESS NOTES
Admission    Patient arrives to room 633-2 via cart from ER.  Care plan note: Patient alert/oriented X3/forgetful. Lungs with audible exp wheezing, on 2 liters 98%.     Inpatient nursing criteria listed below were met:    Did you put disposition on whiteboard and in sticky note: NA  Full skin assessment done (add LDA if skin issue present) :Yes  Isolation education started/completed NA  Patient allergies verified with patient: Yes  Fall Risk? (Care plan updated, Education given and documented) Yes  Primary Care Plan initiated: Yes  Home medications documented in belongings flowsheet: Yes  Patient belongings documented in belongings flowsheet: Yes  Reminder note (belongings/ medications) placed in discharge instructions:Yes  Admission profile/ required documentation complete: Yes  If patient is a 72 hour hold/Commitment are belongings removed from room and locked up? NA

## 2021-12-18 NOTE — PLAN OF CARE
Summary:   Elevated BNP, hyponatremia, hypoxia/SOB  DATE & TIME: 12/18/21, [id number]  Cognitive Concerns/ Orientation : A& O x4, anxious   BEHAVIOR & AGGRESSION TOOL COLOR: Green  CIWA SCORE:6/9/5, ativan given X1   ABNL VS/O2: O2 mid/upper 90s on 2L. Audible inspiratory wheezing, respiratory placed patient on BIPAP due to wheezing, waiting to see what blood gases are & possibly take off BIPAP   MOBILITY: Ax2, pt did not get out of bed on shift due to increased SOB  PAIN MANAGMENT: Denies  DIET: Regular  BOWEL/BLADDER: Incontinent bladder, purewick IP. pt states slightly constipated at baseline, requested and given dulcolax x1  ABNL LAB/BG: Pot 4.4, mag 1.7, phos 3.1 sod 129, hgb 10.1, hematocrit 30, RBC 3.37  DRAIN/DEVICES: Purewick IP, PIV saline locked  TELEMETRY RHYTHM: NSR w/ PACs  SKIN: Scattered bruises  TESTS/PROCEDURES: N/A  D/C DAY/GOALS/PLACE: Pending labs, O2 needs  OTHER IMPORTANT INFO: Hx ETOH. Zyprexa given X1.  Pulmonary consulted.

## 2021-12-18 NOTE — H&P
Admitted: 12/17/2021    CHIEF COMPLAINT:  Shortness of breath and hypoxia.    HISTORY OF PRESENT ILLNESS:  Kavitha Headley is a very pleasant 76-year-old female with a past medical history significant for hyperlipidemia, aortic stenosis, depression, anxiety, chronic pain syndrome, GERD, psoriasis, nephrolithiasis and ascending aortic dilation, among other medical problems, who presents to the Emergency Room for evaluation of hypoxia.  History obtained per discussions with the patient and review of medical record.    The patient says she began feeling ill approximately 1 week ago.  She describes feelings of progressively worsening shortness of breath with an associated cough that has been mostly nonproductive.  She has had some subjective chills, but no fever.  She denies any worsening progressive lower extremity edema.  Note she has been compliant with her Lasix.  Generally, her oral intake has been poor.  She has no known sick contacts.  She was vaccinated for COVID-19 in 05/2021 but has not yet received a booster her symptoms persisted, and she is now unable to lie flat because of her shortness of breath.  She ultimately called EMS and was brought to the emergency room for further evaluation.  O2 saturations were in the 80s per emergency medical services.    In the Emergency Room, she was seen and evaluated by PATRICIA Stuart.  She was afebrile.  She was hypertensive.  O2 saturations were stable on 2 liters nasal cannula.  She had diffusely coarse breath sounds.  O2 saturations stabilized on 2 liters nasal cannula.  Basic labs were obtained and notable for a white count that was normal at 8.4.  Procalcitonin was negative.  ProBNP was 2800.  Her electrolytes were abnormal with sodium 125, potassium 3.2.  Her renal function was stable.  LFTs were within normal limits, and serial troponins were negative.  Influenza and COVID-19 swab was negative.  A chest x-ray showed normal pulmonary vascularity.  There were no focal  consolidations or pleural effusion.  A CT of the chest with PE protocol was obtained and was negative for PE.  There was evidence of cardiomegaly, but no pleural effusions or pulmonary edema.  There was no mention of infiltrates.  She did have dilated pulmonary arteries, thought to be suggestive of chronic pulmonary arterial hypertension.  She had an ectatic ascending thoracic aorta.  An EKG was obtained, which showed normal sinus rhythm with PACs.  In the Emergency Room, she was given several breathing treatments, including nebulizer and inhaler.  She still sounds quite coarse and junky.  She will be getting 40 mg of IV Lasix at the time of this dictation.  We will monitor her response.  She has a history of drinking 6 alcoholic beverages per day and was mildly tremulous, so she will also be getting some Ativan in addition to some potassium replacement.  Plan at this juncture is to admit the hospital for ongoing evaluation and care.    By the time I saw her, she was resting comfortably.  She was able to speak in complete sentences.  Her breathing was mildly improved from on admission, but again had coarse-sounding wheeze and ongoing nonproductive cough.  She is otherwise in no acute distress and able to relay the aforementioned history.    PAST MEDICAL HISTORY:    1.  Hyperlipidemia.  2.  Moderate aortic stenosis.  Follows with Dr. Ugarte.  Had recent echocardiogram and 10/2021 that showed intact EF at 60-65%, moderate to severe aortic stenosis and normal RV systolic function.  3.  Hypertension.  4.  Supraventricular tachycardia and PVCs.  5.  Ascending aorta dilation.  6.  GERD.  7.  Psoriasis.  8.  Anxiety and depression.  9.  Chronic insomnia.  10.  Chronic pain syndrome.  11.  History of nephrolithiasis.  12.  Spinal stenosis.    PAST SURGICAL HISTORY:  Numerous surgeries, including a repair of hammertoe arthrodesis of toes, cystocele repair, mammoplasty, augmentation (bilateral) with a subsequent revision,  hernia repair, laparoscopic cholecystectomy, bilateral carpal tunnel release, a lymph node biopsy, history of lithotripsy and prior stent placement, history of laparotomy with lysis of adhesions, history of appendectomy, bilateral hip replacements, and a history of hysterectomy with bilateral salpingo-oophorectomy as well as a remote history of Otis-en-Y.    FAMILY HISTORY:  Mother is , but no cause of death is documented.  Father has a history of substance abuse and cancer.    SOCIAL HISTORY:  The patient is a lifelong nonsmoker.  She consumes alcohol, she reports 6 drinks per day.  No history of drug use.  She lives alone during the week, and her significant other stays with her on the weekends.    HOME MEDICATIONS:   Prior to Admission medications    Medication Sig Last Dose Taking? Auth Provider   acamprosate (CAMPRAL) 333 MG EC tablet Take 2 tablets (666 mg) by mouth 3 times daily 2021 at pm Yes Jin Ackerman MD   acetaminophen (TYLENOL) 500 MG tablet Take 1,000 mg by mouth 2 times daily as needed for pain Past Week at Unknown time Yes Unknown, Entered By History   aspirin 81 MG EC tablet Take 81 mg by mouth daily 2021 at Unknown time Yes Unknown, Entered By History   atenolol (TENORMIN) 25 MG tablet Take 1 tablet (25 mg) by mouth daily 2021 at Unknown time Yes Herman Ugarte MD   citalopram (CELEXA) 20 MG tablet Take 1 tablet (20 mg) by mouth daily 2021 at Unknown time Yes Nayeli Castorena PA-C   diclofenac (VOLTAREN) 50 MG EC tablet Take 1 tablet (50 mg) by mouth 2 times daily 2021 at pm Yes Jin Ackemran MD   folic acid (FOLVITE) 1 MG tablet Take 1 mg by mouth daily 2021 at Unknown time Yes Unknown, Entered By History   furosemide (LASIX) 20 MG tablet TAKE 1 TABLET DAILY 2021 at Unknown time Yes Herman Ugarte MD   gabapentin (NEURONTIN) 300 MG capsule Take 1 capsule (300 mg) by mouth 3 times daily 2021 at pm Yes Raffaele  Nayeli Gupta PA-C   guaiFENesin-dextromethorphan (ROBITUSSIN DM) 100-10 MG/5ML syrup Take 10 mLs by mouth every 4 hours as needed for cough prn Yes Unknown, Entered By History   hydrOXYzine (ATARAX) 25 MG tablet TAKE 1 TABLET EVERY 6 HOURS AS NEEDED FOR ITCHING  Patient taking differently: Take 25 mg by mouth every 6 hours as needed for anxiety  prn Yes Nayeli Castorena PA-C   losartan (COZAAR) 25 MG tablet Take 1 tablet (25 mg) by mouth daily 12/16/2021 at Unknown time Yes Herman Ugarte MD   methocarbamol (ROBAXIN) 500 MG tablet TAKE 1 TABLET BY MOUTH THREE TIMES A DAY  Patient taking differently: Take 500 mg by mouth 2 times daily  12/16/2021 at pm Yes Nayeli Castorena PA-C   mirtazapine (REMERON) 15 MG tablet Take 1 tablet (15 mg) by mouth At Bedtime 12/16/2021 at Unknown time Yes Jin Ackerman MD   Multiple Vitamins-Minerals (CENTRUM SILVER) per tablet Take 1 tablet by mouth daily 12/16/2021 at Unknown time Yes Reported, Patient   polyethylene glycol (MIRALAX) 17 GM/Dose powder Take 17 g by mouth 2 times daily as needed for constipation prn Yes Unknown, Entered By History   pravastatin (PRAVACHOL) 20 MG tablet Take 1 tablet (20 mg) by mouth daily 12/16/2021 at am Yes Herman Ugarte MD   QUEtiapine (SEROQUEL XR) 150 MG TB24 24 hr tablet Take 1 tablet (150 mg) by mouth At Bedtime Past Week at Unknown time Yes Jin Ackerman MD   QUEtiapine (SEROQUEL) 50 MG tablet Take 1 tablet (50 mg) by mouth nightly as needed (Anxiety, insomnia, hallucinations) prn Yes Jin Ackerman MD   senna-docusate (SENOKOT-S/PERICOLACE) 8.6-50 MG tablet Take 1 tablet by mouth 2 times daily 12/16/2021 at pm Yes Misael Robles MD   thiamine (B-1) 100 MG tablet Take 1 tablet (100 mg) by mouth daily 12/16/2021 at Unknown time Yes Misael Robles MD   traZODone (DESYREL) 100 MG tablet TAKE 1 TABLET NIGHTLY AS NEEDED FOR SLEEP prn Yes Nayeli Castorena PA-C        ALLERGIES:   BACTRIM, CODEINE AND MORPHINE ARE LISTED.    REVIEW OF SYSTEMS:  Full 10-point review of systems were obtained and negative unless otherwise stated per HPI.    PHYSICAL EXAMINATION:    VITAL SIGNS:  Temperature 98.4, pulse of 86, respirations 21, blood pressure 136/79, O2 saturation 87% on room air.  GENERAL:  The patient is a well-nourished and well-developed female, appears stated age, alert, conversing appropriately, in no acute distress.  HEENT:  PERRLA, EOMI, mucous membranes moist.  CARDIOVASCULAR:  Heart rate and rhythm regular.  A murmur is noted towards the sternal border.  No gallops or rubs.  Pulses are plus and symmetric in bilateral upper extremities.  There is no extremity edema.  RESPIRATORY:  There are coarse breath sounds throughout all lung fields with expiratory wheeze.  No overt rhonchi, no increased work of breathing or accessory muscle use.  ABDOMEN:  Obese, soft, nontender, nondistended.  Positive bowel sounds throughout.  INTEGUMENTARY:  Skin is warm and dry.  No rashes, jaundice or ecchymosis.  NEUROLOGIC:  Cranial nerves II-XII are intact.  No focal motor or sensory deficits.  Gait was not assessed.    LABORATORY DATA AND IMAGING:  BMP showed a sodium of 125, potassium 3.2, a creatinine of 0.84.  LFTs were within normal limits.  ProBNP is 2896.  Procalcitonin was negative.  Troponins are negative x 2.  Glucose 128.  CBC showed a white count of 8.4, hemoglobin 11.0, and platelet count of 265.  Influenza and COVID-19 swabs were negative.    Chest x-ray showed a prominent heart size, similar to prior.  There was pulmonary vascularity that was within normal limits.  No focal consolidation, pneumothorax, or pleural effusion.    CT chest with PE protocol showed no evidence of pulmonary embolism.  There is dilated main pulmonary artery suggestive of arterial chronic pulmonary arterial hypertension.  There is an ectatic ascending thoracic aorta at 4.1 cm and cardiomegaly, but no pleural effusion or  evidence of pulmonary edema.    EKG:  Sinus rhythm with first degree AV block and PACs.     ASSESSMENT AND PLAN:  Linda Headley is a 76-year-old female with past medical history of hypertension, hyperlipidemia, chronic alcohol use, aortic stenosis, depression/anxiety, and chronic pain syndrome, among other medical problems, who presents to Emergency Room [facility] for evaluation of shortness of breath.  She was hypoxic and currently needing 2 liters nasal cannula.  She was admitted to the hospital [facility] for ongoing evaluation and care.    1.  Acute hypoxic respiratory failure of unclear etiology, possibly congestive heart failure:  She is maintained on daily oral Lasix, which she is compliant with.  Her EF was intact on recent echocardiogram in 10/2021.  It was reported that RV systolic function was also normal.  She does have noted moderate aortic stenosis.  Interestingly enough, given her hypoxia and need for 2 liters nasal cannula with coarse-sounding breath sounds, her imaging is not really revealing.  There is no evidence of focal consolidation or infiltrate.  No overt evidence of pulmonary edema, and studies were negative for PE.  Her proBNP is elevated tonight in comparison to priors, so this would maybe suggest some component of volume overload.  She has been given Lasix 40 mg IV in the Emergency Room today.  We will monitor her response.  Consider additional dosing in the morning.  Her procalcitonin is negative, and given absence of infiltrate, there is no indication for antibiotics.  We will continue breathing treatments with DuoNebs and albuterol inhaler.  Could consider addition of steroids.  I have placed a consult to Pulmonary Service, and their input in regards to her evaluation is much appreciated.  I have deferred ordering an echocardiogram at this time, given she had just had one within the past 2 months.  Could consider Cardiology consult if it was thought that her aortic stenosis could be factoring  into her situation.   2.  Hypokalemia:  Presumed secondary to decreased oral intake in the recent week in the setting of acute illness as well as the use of Lasix.  Potassium replacement has been ordered.  3.  Hyponatremia:  Thought to be possibly multifactorial related to alcohol use.  She does not look overtly volume overloaded, but again some of her symptoms including orthopnea do sounds suggestive of volume overload.  Monitor sodium with diuresis this stay.  I did order urine studies, but is unclear if they were obtained prior to her receiving Lasix.  4.  Alcohol use:  She drinks 6 drinks per day.  Felt tremulous in the Emergency Room.  She was given some Ativan.  We will monitor CIWA protocol with p.ramez  Ativan.  Her home dose of gabapentin will be continued.  Can also utilize Ativan as needed.  5.  Depression, anxiety and chronic pain syndrome:  Home medications will be continued, including her citalopram, gabapentin, Atarax at bedtime as needed, and Seroquel both and at bedtime as needed, as well as her Remeron and trazodone.  6.  Hypertension:  Chronic and stable on antihypertensives.  These will be continued, including her atenolol and losartan.  7.  Hyperlipidemia:  Chronic and stable on statin.  This will be continued.    DEEP VENOUS THROMBOSIS PROPHYLAXIS:  PCDs.    CODE STATUS:  Full code as was discussed with the patient and her daughter, Nery Brown, as her designated decision-maker.    Nan Macias DO      D: 2021   T: 2021   MT: CMAMT/DCQA    Name:     DARWIN JASSO  MRN:      [id number]        Account:     [id number]   :      1945           Admitted:    2021       Document: [id number]    cc:  Herman Ugarte MD

## 2021-12-18 NOTE — PLAN OF CARE
Summary:   Elevated BNP, hyponatremia, hypoxia/SOB  DATE & TIME: 12/17/21, 7353-1096   Cognitive Concerns/ Orientation : A& O x4, anxious   BEHAVIOR & AGGRESSION TOOL COLOR: Green  CIWA SCORE: 4, 3 (mild tremors and anxiety)   ABNL VS/O2: /100, O2 mid/upper 90s on 2L. Audible inspiratory wheezing.  MOBILITY: Ax2, pt did not get out of bed on shift  PAIN MANAGMENT: Denies  DIET: Regular  BOWEL/BLADDER: Incontinent bladder, purewick IP. pt states slightly constipated at baseline, requested and given dulcolax x1  ABNL LAB/BG: Pot 4.4 (was 3.4, replacement given), mag 1.4, sod 129, hgb 10.1, hematocrit 30, RBC 3.37  DRAIN/DEVICES: Purewick IP, PIV saline locked  TELEMETRY RHYTHM: NSR w/ 1st deg heart block  SKIN: WDL  TESTS/PROCEDURES: N/A  D/C DAY/GOALS/PLACE: Pending labs, O2 needs  OTHER IMPORTANT INFO: Hx ETOH. Seroquel x1 for anxiety, trazadone x1 and melatonin x1 for sleep. Pt to see pulmonary service.

## 2021-12-18 NOTE — PROGRESS NOTES
[facility]    Hospitalist Progress Note    Assessment & Plan     ASSESSMENT AND PLAN:  Linda Headley is a 76-year-old female with past medical history of hypertension, hyperlipidemia, chronic alcohol use, aortic stenosis, depression/anxiety, and chronic pain syndrome, among other medical problems, [facility].  She was hypoxic and currently needing 2 liters nasal cannula.  She was admitted to the hospital [facility] for ongoing evaluation and care.     Acute hypoxic respiratory failure  Suspect 2/2 Viral URI with Reactive Airway Disease  Possible component of mild chf (mod-severe Aortic stenosis)    -She is maintained on daily oral Lasix, which she is compliant with.   - Her EF was intact on recent echocardiogram in 10/2021.  It was reported that RV systolic function was also normal.  She does have noted moderate aortic stenosis.  Interestingly enough, given her hypoxia and need for 2 liters nasal cannula with coarse-sounding breath sounds, her imaging is not really revealing.  There is no evidence of focal consolidation or infiltrate.  No overt evidence of pulmonary edema, and studies were negative for PE.   - Her proBNP is elevated tonight in comparison to priors, so this would maybe suggest some component of volume overload.    -given Lasix 40 mg IV in the Emergency Room on admission  -procal negative  -I/O -810 ml.   Wt 81.6kg---> 85/84kg.   -started on nebs qid, may be contributing to tremulousness  -covid and influenza pcr negative.     Dry to mildly productive cough for 1 week. New wheezing and sob  Significant other with similar sxs  Not feeling much better. Nebs help slightly  Decrease po intake     Suspect viral URI/tracheobronchitis with RAD    Plan;    - solumedrol 125mg iv X 1 then prednisone 40mg bid tomorrow  Neb X 1 now then increase to q4 hours  --I/O, daily wt  - IS, wean oxygen  - am labs  -pulmonary following.    -Azithromycin course  -hold off on lasix iv for now.   -resume PTA lasix tomorrow follow bmp, hold off on repeat echo as last was 2 month ago    Addendum: 1530  Called by RN at 1400 as RT gave neb then placed on bipap given ongoing wheezing. No report of resp fatigue or increased work of breathing. Pt was on 1-2 L NC at the time. RN had not noted resp distress. Pt looked comfortable on bipap when I saw the patient at 1400. bipap orders placed.   F/u vbg about 30 min into bipap without resp failure.   Pt seen again now, diffuse wheezing but no resp distress  removed bipap  satting upper 90s on 2LNC  Cont pulse oximetry  bipap prn, vbg prn  Will give another dose of lasix 20mg IV X 1 as may have component of chf. Not seem grossly volume up.   Recheck Urine Na/osmol now before lasix this afternoon. Had lasix at 1700 yesterday.   Hyponatremia may be 2/2 to decreased solute intake this past week. Unclear if volume up. Think primarily RAD/pulmonary issue. Taking po.  Sodium admission improved with lasix iv. Recheck at 2200 to see response to lasix this afternoon.  Increase solute intake          Hypokalemia:   Hypomagnesemia  Both resolved.   - Presumed secondary to decreased oral intake in the recent week in the setting of acute illness as well as the use of Lasix.  Potassium replacement has been ordered. Replaced K and Mg per protocol and normalized  - follow for now daily recheck am  -follow lytes daily    Hyponatremia: resolved    -Thought to be possibly multifactorial related to alcohol use.   - She does not look overtly volume overloaded, but again some of her symptoms including orthopnea do sounds suggestive of volume overload.    -Na \125--> 129 X 2 today   -K now nl  -Urine Na 53 post lasix. Uosm 322. After lasix on admission  -has received lasix 40mg iv X 1 1700 day admission  -Na up with iv lasix. Not receive fluids.   - suspect 2/2 decreased solute intake, possible component of chf    Plan:  - Monitor sodium  with diuresis this stay.   -hold off on iv lasix today. Restart po lasix tomorrow  - follow bmp    Addendum;1530 Na this afternoon 128. Improved on admission with lasix. Will given another dose as may have component of chf. Suspect decreased solute intake and possibly chf account for low Na, will recheck Na this pm with parameters to call if <126.      Alcohol use:   -She drinks 6 drinks per day  -.  Felt tremulous in the Emergency Room.  She was given some Ativan.   -CIWA protocol started. Score 4---> 3-->9. Tremor, anxiety, agitation  -tremors from this am better. Needed ativan. Nebs may worse tremor.   -nonfocal neuro exam. No tremor currently    Plan:   - monitor CIWA protocol with p.r.n.  Ativan.  Her home dose of gabapentin will be continued.  Can also utilize Ativan as needed.  Chem dep consult  B12, folate level. Thiamine, mv, folate      Depression, anxiety and chronic pain syndrome:    -Home medications will be continued, including her citalopram, gabapentin, Atarax at bedtime as needed, and Seroquel both and at bedtime as needed, as well as her Remeron and trazodone.     Hypertension:   -running slightly high  - Chronic and stable on antihypertensives.  These will be continued, including her atenolol and losartan. Follow   Am [facility]     Hyperlipidemia:  Chronic and stable on statin.  This will be continued.     DEEP VENOUS THROMBOSIS PROPHYLAXIS:  PCDs.     CODE STATUS:  Full code as was discussed with the patient and her daughter, Nery Brown, as her designated decision-maker on admission.     Discussed care plan with pt, pulmonary and rn    Nicho Marquez MD  Text Page  (7am to 6pm)  Interval History   Still with dry cough, wheezing, sob. Nebs help a bit.   Significant other similar sxs.   Taking meds  No po intake for last several days    -Data reviewed today: I reviewed all new labs and imaging results over the last 24 hours. I personally reviewed imaging and labs since admission.     Physical Exam    Temp: 98.5  F (36.9  C) Temp src: Oral BP: (!) 140/89 Pulse: 75   Resp: 20 SpO2: 96 % O2 Device: Nasal cannula Oxygen Delivery: 1 LPM  Vitals:    12/17/21 1225 12/17/21 1840 12/18/21 0455   Weight: 81.6 kg (180 lb) 85.3 kg (188 lb) 84.8 kg (187 lb)     Vital Signs with Ranges  Temp:  [98  F (36.7  C)-98.5  F (36.9  C)] 98.5  F (36.9  C)  Pulse:  [] 75  Resp:  [18-26] 20  BP: (109-179)/() 140/89  SpO2:  [95 %-100 %] 96 %  I/O last 3 completed shifts:  In: 240 [P.O.:240]  Out: 1050 [Urine:1050]    Constitutional: mild sob, nontoxic, cough  Respiratory: Bilateral wheezing with decrease airflow. Decreased BS L base  Cardiovascular: not tachy, RRR no r/g. 3/6 systolic murmur RUSB  GI: soft, nt, nd  Skin/Integumen: no rash or edema. No cyanosis  Psych; calm, pleasant, cooperative. No psychosis  Neuro: nl speech and  Mentation. Fully oriented. No tremor currently      Medications       aspirin  81 mg Oral Daily     atenolol  25 mg Oral Daily     [Held by provider] citalopram  20 mg Oral Daily     folic acid  1 mg Oral Daily     gabapentin  300 mg Oral TID     ipratropium - albuterol 0.5 mg/2.5 mg/3 mL  3 mL Nebulization 4x daily     losartan  25 mg Oral Daily     mirtazapine  15 mg Oral At Bedtime     multivitamin w/minerals  1 tablet Oral Daily     pravastatin  20 mg Oral Daily     QUEtiapine  150 mg Oral At Bedtime     sodium chloride (PF)  3 mL Intracatheter Q8H     thiamine  100 mg Oral Daily       Data   Recent Labs   Lab 12/18/21  0758 12/18/21  0340 12/17/21  2140 12/17/21  2019 12/17/21  1402 12/17/21  1250   WBC  --  7.3  --   --   --  8.4   HGB  --  10.1*  --   --   --  11.0*   MCV  --  89  --   --   --  90   PLT  --  229  --   --   --  265   * 129*  --   --  125*  --    POTASSIUM 4.1 4.4  4.4 3.4   < > 3.2*  --    CHLORIDE 94 94  --   --  88*  --    CO2 31 31  --   --  30  --    BUN 12 13  --   --  9  --    CR 0.90 0.81  --   --  0.84  --    ANIONGAP 4 4  --   --  7  --    BIRGIT 8.4* 8.5  --    --  9.2  --    * 111*  --   --  128*  --    ALBUMIN  --   --   --   --  3.7  --    PROTTOTAL  --   --   --   --  8.4  --    BILITOTAL  --   --   --   --  0.4  --    ALKPHOS  --   --   --   --  134  --    ALT  --   --   --   --  17  --    AST  --   --   --   --  19  --     < > = values in this interval not displayed.       Imaging:   Recent Results (from the past 24 hour(s))   XR Chest Port 1 View    Narrative    CHEST ONE VIEW PORTABLE December 17, 2021 1:23 PM     HISTORY: Cough, shortness of breath.    COMPARISON: 3/23/2021.      Impression    IMPRESSION: Prominent heart size similar to prior. Pulmonary  vascularity within normal limits. No airspace consolidation,  pneumothorax, or pleural effusion.    KELLEY MOORE MD         SYSTEM ID:  [id number]   CT Chest Pulmonary Embolism w Contrast    Narrative    CT CHEST PULMONARY EMBOLISM WITH CONTRAST 12/17/2021 3:45 PM    CLINICAL HISTORY: PE suspected, high probability. Cough, hypoxia,  clear x-ray.    TECHNIQUE: CT angiogram chest during arterial phase injection IV  contrast. 2D and 3D MIP reconstructions were performed by the CT  technologist. Dose reduction techniques were used.     CONTRAST: 68mL ISOVUE-370    COMPARISON: 2/13/2020.    FINDINGS:  ANGIOGRAM CHEST: Main pulmonary artery is dilated at 3.9 cm. No  evidence of pulmonary embolism. The ascending thoracic aorta is  dilated at 4.1 cm. No evidence of aortic dissection.    LUNGS AND PLEURA: No airspace consolidation or pleural effusion.  Incidental calcified granuloma in the right upper lobe.    MEDIASTINUM/AXILLAE: The heart is enlarged. No thoracic or axillary  lymphadenopathy.    CORONARY ARTERY CALCIFICATION: Moderate.    UPPER ABDOMEN: Previous gastric bypass surgery.    MUSCULOSKELETAL: No destructive bone lesions.      Impression    IMPRESSION:  1.  No evidence of pulmonary embolism.  2.  Dilated main pulmonary arteries suggest chronic pulmonary arterial  hypertension.  3.  Ectatic ascending  thoracic aorta at 4.1 cm.  4.  Cardiomegaly, but no pleural effusion or evidence of pulmonary  edema.    KELLEY MOORE MD         SYSTEM ID:  [id number]

## 2021-12-19 ENCOUNTER — APPOINTMENT (OUTPATIENT)
Dept: PHYSICAL THERAPY | Facility: CLINIC | Age: 76
DRG: 202 | End: 2021-12-19
Attending: INTERNAL MEDICINE
Payer: COMMERCIAL

## 2021-12-19 ENCOUNTER — APPOINTMENT (OUTPATIENT)
Dept: OCCUPATIONAL THERAPY | Facility: CLINIC | Age: 76
DRG: 202 | End: 2021-12-19
Attending: INTERNAL MEDICINE
Payer: COMMERCIAL

## 2021-12-19 LAB
ANION GAP SERPL CALCULATED.3IONS-SCNC: 4 MMOL/L (ref 3–14)
BUN SERPL-MCNC: 21 MG/DL (ref 7–30)
CALCIUM SERPL-MCNC: 9.3 MG/DL (ref 8.5–10.1)
CHLORIDE BLD-SCNC: 94 MMOL/L (ref 94–109)
CO2 SERPL-SCNC: 33 MMOL/L (ref 20–32)
CREAT SERPL-MCNC: 1.07 MG/DL (ref 0.52–1.04)
ERYTHROCYTE [DISTWIDTH] IN BLOOD BY AUTOMATED COUNT: 14.7 % (ref 10–15)
GFR SERPL CREATININE-BSD FRML MDRD: 50 ML/MIN/1.73M2
GLUCOSE BLD-MCNC: 136 MG/DL (ref 70–99)
HCT VFR BLD AUTO: 31.4 % (ref 35–47)
HGB BLD-MCNC: 10.2 G/DL (ref 11.7–15.7)
MAGNESIUM SERPL-MCNC: 1.8 MG/DL (ref 1.6–2.3)
MCH RBC QN AUTO: 29.8 PG (ref 26.5–33)
MCHC RBC AUTO-ENTMCNC: 32.5 G/DL (ref 31.5–36.5)
MCV RBC AUTO: 92 FL (ref 78–100)
PHOSPHATE SERPL-MCNC: 2.9 MG/DL (ref 2.5–4.5)
PLATELET # BLD AUTO: 255 10E3/UL (ref 150–450)
POTASSIUM BLD-SCNC: 4.1 MMOL/L (ref 3.4–5.3)
RBC # BLD AUTO: 3.42 10E6/UL (ref 3.8–5.2)
SODIUM SERPL-SCNC: 131 MMOL/L (ref 133–144)
WBC # BLD AUTO: 7.2 10E3/UL (ref 4–11)

## 2021-12-19 PROCEDURE — 83735 ASSAY OF MAGNESIUM: CPT | Performed by: INTERNAL MEDICINE

## 2021-12-19 PROCEDURE — 97116 GAIT TRAINING THERAPY: CPT | Mod: GP

## 2021-12-19 PROCEDURE — 94640 AIRWAY INHALATION TREATMENT: CPT

## 2021-12-19 PROCEDURE — 250N000013 HC RX MED GY IP 250 OP 250 PS 637: Performed by: INTERNAL MEDICINE

## 2021-12-19 PROCEDURE — 82747 ASSAY OF FOLIC ACID RBC: CPT | Performed by: INTERNAL MEDICINE

## 2021-12-19 PROCEDURE — 85027 COMPLETE CBC AUTOMATED: CPT | Performed by: INTERNAL MEDICINE

## 2021-12-19 PROCEDURE — 99233 SBSQ HOSP IP/OBS HIGH 50: CPT | Performed by: INTERNAL MEDICINE

## 2021-12-19 PROCEDURE — 97535 SELF CARE MNGMENT TRAINING: CPT | Mod: GO | Performed by: OCCUPATIONAL THERAPIST

## 2021-12-19 PROCEDURE — 97530 THERAPEUTIC ACTIVITIES: CPT | Mod: GP

## 2021-12-19 PROCEDURE — 97165 OT EVAL LOW COMPLEX 30 MIN: CPT | Mod: GO | Performed by: OCCUPATIONAL THERAPIST

## 2021-12-19 PROCEDURE — 250N000013 HC RX MED GY IP 250 OP 250 PS 637: Performed by: NURSE PRACTITIONER

## 2021-12-19 PROCEDURE — 97161 PT EVAL LOW COMPLEX 20 MIN: CPT | Mod: GP

## 2021-12-19 PROCEDURE — 120N000001 HC R&B MED SURG/OB

## 2021-12-19 PROCEDURE — 250N000009 HC RX 250: Performed by: INTERNAL MEDICINE

## 2021-12-19 PROCEDURE — 250N000011 HC RX IP 250 OP 636: Performed by: INTERNAL MEDICINE

## 2021-12-19 PROCEDURE — 84100 ASSAY OF PHOSPHORUS: CPT | Performed by: INTERNAL MEDICINE

## 2021-12-19 PROCEDURE — 36415 COLL VENOUS BLD VENIPUNCTURE: CPT | Performed by: INTERNAL MEDICINE

## 2021-12-19 PROCEDURE — 999N000157 HC STATISTIC RCP TIME EA 10 MIN

## 2021-12-19 PROCEDURE — 250N000012 HC RX MED GY IP 250 OP 636 PS 637: Performed by: INTERNAL MEDICINE

## 2021-12-19 PROCEDURE — 94640 AIRWAY INHALATION TREATMENT: CPT | Mod: 76

## 2021-12-19 PROCEDURE — 80048 BASIC METABOLIC PNL TOTAL CA: CPT | Performed by: INTERNAL MEDICINE

## 2021-12-19 RX ORDER — FUROSEMIDE 10 MG/ML
20 INJECTION INTRAMUSCULAR; INTRAVENOUS ONCE
Status: COMPLETED | OUTPATIENT
Start: 2021-12-19 | End: 2021-12-19

## 2021-12-19 RX ADMIN — FUROSEMIDE 20 MG: 10 INJECTION, SOLUTION INTRAVENOUS at 12:29

## 2021-12-19 RX ADMIN — ACETAMINOPHEN 650 MG: 325 TABLET, FILM COATED ORAL at 20:03

## 2021-12-19 RX ADMIN — ATENOLOL 25 MG: 25 TABLET ORAL at 08:22

## 2021-12-19 RX ADMIN — AZITHROMYCIN MONOHYDRATE 500 MG: 250 TABLET ORAL at 08:21

## 2021-12-19 RX ADMIN — IPRATROPIUM BROMIDE AND ALBUTEROL SULFATE 3 ML: .5; 3 SOLUTION RESPIRATORY (INHALATION) at 08:18

## 2021-12-19 RX ADMIN — SENNOSIDES AND DOCUSATE SODIUM 2 TABLET: 50; 8.6 TABLET ORAL at 20:07

## 2021-12-19 RX ADMIN — POLYETHYLENE GLYCOL 3350 17 G: 17 POWDER, FOR SOLUTION ORAL at 20:02

## 2021-12-19 RX ADMIN — PREDNISONE 40 MG: 20 TABLET ORAL at 08:21

## 2021-12-19 RX ADMIN — ASPIRIN 81 MG: 81 TABLET, COATED ORAL at 08:22

## 2021-12-19 RX ADMIN — IPRATROPIUM BROMIDE AND ALBUTEROL SULFATE 3 ML: .5; 3 SOLUTION RESPIRATORY (INHALATION) at 11:54

## 2021-12-19 RX ADMIN — GABAPENTIN 300 MG: 300 CAPSULE ORAL at 21:53

## 2021-12-19 RX ADMIN — GABAPENTIN 300 MG: 300 CAPSULE ORAL at 08:21

## 2021-12-19 RX ADMIN — PRAVASTATIN SODIUM 20 MG: 20 TABLET ORAL at 08:22

## 2021-12-19 RX ADMIN — ACETAMINOPHEN 650 MG: 325 TABLET, FILM COATED ORAL at 08:27

## 2021-12-19 RX ADMIN — PREDNISONE 40 MG: 20 TABLET ORAL at 17:24

## 2021-12-19 RX ADMIN — MIRTAZAPINE 15 MG: 15 TABLET, FILM COATED ORAL at 21:53

## 2021-12-19 RX ADMIN — FOLIC ACID 1 MG: 1 TABLET ORAL at 08:22

## 2021-12-19 RX ADMIN — CITALOPRAM HYDROBROMIDE 20 MG: 20 TABLET ORAL at 08:21

## 2021-12-19 RX ADMIN — MULTIPLE VITAMINS W/ MINERALS TAB 1 TABLET: TAB at 08:22

## 2021-12-19 RX ADMIN — LOSARTAN POTASSIUM 25 MG: 25 TABLET, FILM COATED ORAL at 08:21

## 2021-12-19 RX ADMIN — MELATONIN 5 MG TABLET 5 MG: at 21:53

## 2021-12-19 RX ADMIN — LORAZEPAM 1 MG: 1 TABLET ORAL at 17:28

## 2021-12-19 RX ADMIN — QUETIAPINE FUMARATE 150 MG: 150 TABLET, EXTENDED RELEASE ORAL at 21:52

## 2021-12-19 RX ADMIN — IPRATROPIUM BROMIDE AND ALBUTEROL SULFATE 3 ML: .5; 3 SOLUTION RESPIRATORY (INHALATION) at 15:18

## 2021-12-19 RX ADMIN — THIAMINE HCL TAB 100 MG 100 MG: 100 TAB at 08:22

## 2021-12-19 RX ADMIN — GABAPENTIN 300 MG: 300 CAPSULE ORAL at 17:24

## 2021-12-19 RX ADMIN — BISACODYL 5 MG: 5 TABLET, COATED ORAL at 20:07

## 2021-12-19 ASSESSMENT — ACTIVITIES OF DAILY LIVING (ADL)
ADLS_ACUITY_SCORE: 12
ADLS_ACUITY_SCORE: 10
ADLS_ACUITY_SCORE: 10
ADLS_ACUITY_SCORE: 12
ADLS_ACUITY_SCORE: 10
PREVIOUS_RESPONSIBILITIES: MEAL PREP;HOUSEKEEPING;LAUNDRY;SHOPPING;MEDICATION MANAGEMENT;FINANCES;DRIVING
ADLS_ACUITY_SCORE: 12
ADLS_ACUITY_SCORE: 12
ADLS_ACUITY_SCORE: 10
ADLS_ACUITY_SCORE: 11
ADLS_ACUITY_SCORE: 10
ADLS_ACUITY_SCORE: 9
ADLS_ACUITY_SCORE: 10
ADLS_ACUITY_SCORE: 11
ADLS_ACUITY_SCORE: 12
ADLS_ACUITY_SCORE: 12
ADLS_ACUITY_SCORE: 10
ADLS_ACUITY_SCORE: 12
ADLS_ACUITY_SCORE: 12
ADLS_ACUITY_SCORE: 10

## 2021-12-19 NOTE — PROGRESS NOTES
12/19/21 1330   Quick Adds   Type of Visit Initial Occupational Therapy Evaluation   Living Environment   People in home alone   Current Living Arrangements house   Number of Stairs, Main Entrance 3   Number of Stairs, Within Home, Primary greater than 10 stairs   Living Environment Comments Lives in a townhome, stays on the main level   Self-Care   Usual Activity Tolerance good   Current Activity Tolerance poor   Regular Exercise No   Equipment Currently Used at Home none   Activity/Exercise/Self-Care Comment Has access to walker, shower chair, raised toilet seat, cane   Instrumental Activities of Daily Living (IADL)   Previous Responsibilities meal prep;housekeeping;laundry;shopping;medication management;finances;driving   General Information   Onset of Illness/Injury or Date of Surgery 12/17/21   Referring Physician Nicho Marquez MD   Patient/Family Therapy Goal Statement (OT) Pt hopes to go home soon   Additional Occupational Profile Info/Pertinent History of Current Problem Linda Headley is a 76-year-old female with past medical history of hypertension, hyperlipidemia, chronic alcohol use, aortic stenosis, depression/anxiety, and chronic pain syndrome, among other medical problems, who presents to Emergency Room tonight for evaluation of shortness of breath, thought to have viral illness and reactive airway disease with a component of CHF   Existing Precautions/Restrictions fall   General Observations and Info Weaning O2; satting 90's on RA at end of eval   Cognitive Status Examination   Orientation Status orientation to person, place and time   Follows Commands follows multi-step commands   Cognitive Status Comments needs further assessment   Sensory   Sensory Quick Adds No deficits were identified   Pain Assessment   Patient Currently in Pain No   Range of Motion Comprehensive   Comment, General Range of Motion BUE AROM intact   Strength Comprehensive (MMT)   Comment, General Manual Muscle Testing (MMT)  Assessment 4-/5 BUE   Muscle Tone Assessment   Muscle Tone Comments constant tremors; may be going through ETOH withdrawal per notes   Coordination   Coordination Comments fine motor control impaired by tremors at rest and with activity   Bed Mobility   Comment (Bed Mobility) not observed; Min A per notes   Transfers   Transfer Comments Min A sit<>stand   Balance   Balance Comments Unsteady and shaky. CGA to ambulate with WW   Activities of Daily Living   BADL Assessment toileting   Toileting   [place] Level (Toileting) contact guard assist   Clinical Impression   Criteria for Skilled Therapeutic Interventions Met (OT) yes   OT Diagnosis impaired ADL and mobility   OT Problem List-Impairments impacting ADL problems related to;activity tolerance impaired;balance;coordination;motor control;strength;mobility   Assessment of Occupational Performance 3-5 Performance Deficits   Identified Performance Deficits ADL, IADL, mobility   Planned Therapy Interventions (OT) ADL retraining;IADL retraining;balance training;bed mobility training;cognition;strengthening;progressive activity/exercise;transfer training;risk factor education   Clinical Decision Making Complexity (OT) low complexity   Therapy Frequency (OT) Daily   Predicted Duration of Therapy 5 days   Anticipated Equipment Needs Upon Discharge (OT) shower chair;walker, rolling   Risk & Benefits of therapy have been explained evaluation/treatment results reviewed;care plan/treatment goals reviewed;risks/benefits reviewed;current/potential barriers reviewed;participants voiced agreement with care plan;participants included;patient   OT Discharge Planning    OT Discharge Recommendation (DC Rec) Home with assist;home with home care occupational therapy;Transitional Care Facility   OT Rationale for DC Rec Pt needs Min-CGA with ADL and mobility with low activity tolerance and constant tremors. May have a component of ETOH withdrawal per chart. Pt may need TCU if someone  not able to stay with her for a few days, as expect her to need assist with some mobility and ADL at home.   OT Brief overview of current status  Min A toilet task and functional mobility with WW   Total Evaluation Time (Minutes)   Total Evaluation Time (Minutes) 10

## 2021-12-19 NOTE — PLAN OF CARE
Summary:   Elevated BNP, hyponatremia, hypoxia/SOB  DATE & TIME: 12/18/21, 1101-3759  Cognitive Concerns/ Orientation : A& O x4, anxious   BEHAVIOR & AGGRESSION TOOL COLOR: Green  CIWA SCORE:5 and CIERRA pt sleeping  ABNL VS/O2: O2 mid/upper 90s on 2L. Audible inspiratory wheezing, respiratory placed patient on BIPAP during day x1 hour d/t wheezing  MOBILITY: Ax2, pt did not get out of bed on shift   PAIN MANAGMENT: Denies  DIET: Low sat fat diet  BOWEL/BLADDER: Incontinent bladder, purewick IP. pt states slightly constipated at baseline, requested and given dulcolax x1 and miralax x1  ABNL LAB/BG: Sod 130   DRAIN/DEVICES: Purewick IP, PIV saline locked  TELEMETRY RHYTHM: NSR w/ 1st degree heart block  SKIN: Scattered bruises forearms  TESTS/PROCEDURES: N/A  D/C DAY/GOALS/PLACE: Pending labs, O2 needs  OTHER IMPORTANT INFO: Hx ETOH.  Pulmonary consulted. Melatonin given x1.

## 2021-12-19 NOTE — PROGRESS NOTES
"PULMONOLOGY PROGRESS NOTE    Date of Admission: 12/17/2021    CC/Reason for Hospital visit: Hypoxemia and bronchospasm.  SUBJECTIVE      Stable night.  Patient notes that she feels better today.  Still wheezing, and cough is mildly productive.        ROS: A Problem Pertinent review of systems was negative except for items noted in HPI.  Past Medical, Family, and Social/Substance History has been reviewed: No interval changes  OBJECTIVE   Vital signs:  Temp: 98  F (36.7  C) Temp src: Oral BP: 128/70 Pulse: 76   Resp: 20 SpO2: 99 % O2 Device: Nasal cannula Oxygen Delivery: 2 LPM Height: 162.6 cm (5' 4\") Weight: 84.8 kg (187 lb)  Estimated body mass index is 32.1 kg/m  as calculated from the following:    Height as of this encounter: 1.626 m (5' 4\").    Weight as of this encounter: 84.8 kg (187 lb).        I/O last 3 completed shifts:  In: 200 [P.O.:200]  Out: 1200 [Urine:1200]      CONSTITUTIONAL/GENERAL APPEARANCE: Alert  female, lying in bed. No Apparent Distress.  PSYCHIATRIC: Pleasant and appropriate mood and affect. Oriented x 3.  EARS, NOSE,THROAT,MOUTH: External ears and nose overall normal. Normal oral mucosa.   NECK: Neck appearance normal. No neck masses and the thyroid is not enlarged.   RESPIRATORY: Non-labored effort.  Mild diffuse expiratory wheezes heard throughout the lung fields.  CARDIOVASCULAR: S1, S2, regular rate and rhythm, grade 3 out of 6 systolic ejection murmur.  . Extremities with no edema.      LABORATORY ASSESSMENT    Arterial Blood Gas  Recent Labs   Lab 12/18/21  1432 12/17/21  1312   O2PER 35 3     CBC  Recent Labs   Lab 12/19/21  0858 12/18/21  0340 12/17/21  1250   WBC 7.2 7.3 8.4   RBC 3.42* 3.37* 3.67*   HGB 10.2* 10.1* 11.0*   HCT 31.4* 30.0* 33.0*   MCV 92 89 90   MCH 29.8 30.0 30.0   MCHC 32.5 33.7 33.3   RDW 14.7 14.1 13.8    229 265     BMP  Recent Labs   Lab 12/19/21  0858 12/18/21  2110 12/18/21  1432 12/18/21  0758 12/18/21  0340 12/17/21  2140 12/17/21  2019 " 12/17/21  1402   * 130* 128* 129* 129*  --   --  125*   POTASSIUM 4.1  --   --  4.1 4.4  4.4 3.4   < > 3.2*   CHLORIDE 94  --   --  94 94  --   --  88*   BIRGIT 9.3  --   --  8.4* 8.5  --   --  9.2   CO2 33*  --   --  31 31  --   --  30   BUN 21  --   --  12 13  --   --  9   CR 1.07*  --   --  0.90 0.81  --   --  0.84   *  --   --  110* 111*  --   --  128*    < > = values in this interval not displayed.     INRNo lab results found in last 7 days.   BNPNo lab results found in last 7 days.  VENOUS BLOOD GASES  Recent Labs   Lab 12/18/21  1432 12/17/21  1312   PHV 7.37 7.36   PCO2V 56* 58*   PO2V 56* 34   HCO3V 32* 32*   MOSHE 5.6* 5.3*         Additional labs and/or comments:     IMAGING      CT CHEST PULMONARY EMBOLISM WITH CONTRAST 12/17/2021 3:45 PM     CLINICAL HISTORY: PE suspected, high probability. Cough, hypoxia,  clear x-ray.     TECHNIQUE: CT angiogram chest during arterial phase injection IV  contrast. 2D and 3D MIP reconstructions were performed by the CT  technologist. Dose reduction techniques were used.      CONTRAST: 68mL ISOVUE-370     COMPARISON: 2/13/2020.     FINDINGS:  ANGIOGRAM CHEST: Main pulmonary artery is dilated at 3.9 cm. No  evidence of pulmonary embolism. The ascending thoracic aorta is  dilated at 4.1 cm. No evidence of aortic dissection.     LUNGS AND PLEURA: No airspace consolidation or pleural effusion.  Incidental calcified granuloma in the right upper lobe.     MEDIASTINUM/AXILLAE: The heart is enlarged. No thoracic or axillary  lymphadenopathy.     CORONARY ARTERY CALCIFICATION: Moderate.     UPPER ABDOMEN: Previous gastric bypass surgery.     MUSCULOSKELETAL: No destructive bone lesions.                                                                      IMPRESSION:  1.  No evidence of pulmonary embolism.  2.  Dilated main pulmonary arteries suggest chronic pulmonary arterial  hypertension.  3.  Ectatic ascending thoracic aorta at 4.1 cm.  4.  Cardiomegaly, but no  pleural effusion or evidence of pulmonary  edema.  PFT & OTHER TESTING       ASSESSMENT / PLAN      Pulmonary Diagnoses:  Bronchitis acute  Cough R05  Hypoxemia R09.02  SOB R06.02  Bronchospasm    Additional COVID-19 diagnoses:  Concern of possible exposure to COVID-19, Now RULED OUT Z03.818     ASSESSMENT : 76-year-old woman, lifelong non-smoker, history of moderate aortic stenosis presents with increasing cough and wheeze following a cold which developed approximately a week ago.  This is accompanied by hypoxemia requiring supplemental oxygen.  CT scan of the chest is unremarkable and negative for pulmonary embolism.     I think this likely represents asthma with bronchitis, likely viral in nature, cannot rule out bacterial but this seems less likely.        PLAN:   Continue prednisone 40 mg twice daily    Azithromycin 500 mg daily for 3 days.    Continue oxygen, titrating to maintain O2 saturations greater than 92%.    Continue albuterol/ipratropium norberto Ulrich M.D.  [facility]  [facility]  [phone]  Pager [phone]

## 2021-12-19 NOTE — PROGRESS NOTES
[facility]    Hospitalist Progress Note    Assessment & Plan     ASSESSMENT AND PLAN:  Linda Headley is a 76-year-old female with past medical history of hypertension, hyperlipidemia, chronic alcohol use, aortic stenosis, depression/anxiety, and chronic pain syndrome, among other medical problems, who presents to Emergency Room [facility] for evaluation of shortness of breath.  She was hypoxic and currently needing 2 liters nasal cannula.  She was admitted to the hospital [facility] for ongoing evaluation and care.     Acute hypoxic respiratory failure  Suspect 2/2 Viral URI with Reactive Airway Disease  Possible component of mild chf (mod-severe Aortic stenosis)    -She is maintained on daily oral Lasix, which she is compliant with.   - Her EF was intact on recent echocardiogram in 10/2021.  It was reported that RV systolic function was also normal.  She does have noted moderate aortic stenosis.  Interestingly enough, given her hypoxia and need for 2 liters nasal cannula with coarse-sounding breath sounds, her imaging is not really revealing.  There is no evidence of focal consolidation or infiltrate.  No overt evidence of pulmonary edema, and studies were negative for PE.   - Her proBNP is elevated tonight in comparison to priors, so this would maybe suggest some component of volume overload.    -given Lasix 40 mg IV in the Emergency Room on admission  -procal negative  -I/O -810 ml.   Wt 81.6kg---> 85/84kg.   -started on nebs qid, may be contributing to tremulousness  -covid and influenza pcr negative.     Dry to mildly productive cough for 1 week. New wheezing and sob  Significant other with similar sxs  Not feeling much better. Nebs help slightly  Decrease po intake     -started on steroids 12/18. Seen by pulmonary. Azithromycin started for tracheobronchitis    Suspect viral URI/tracheobronchitis with RAD    Today.   Feels better. Still with wheezing but moving air better. Less cough  Stable  night on 2 L NC  I/O -1.4 liters    Plan;    -lasix 20mg iv X 1  -  prednisone 40mg bid  Neb X 1 now then increase to q4 hours  --I/O, daily wt  - IS, wean oxygen  - am labs  -pulmonary following.   -Azithromycin course  -hold off on lasix iv for now.   -resume PTA lasix tomorrow follow bmp, hold off on repeat echo as last was 2 month ago           Hypokalemia:   Hypomagnesemia  Both resolved.   - Presumed secondary to decreased oral intake in the recent week in the setting of acute illness as well as the use of Lasix.  Potassium replacement has been ordered. Replaced K and Mg per protocol and normalized  - follow for now daily recheck am  -follow lytes daily    Hyponatremia: improving    -Thought to be possibly multifactorial related to alcohol use.   - She does not look overtly volume overloaded, but again some of her symptoms including orthopnea do sounds suggestive of volume overload.    -Na \125--> 129 X 2 today   -K now nl  -Urine Na 53 post lasix. Uosm 322. After lasix on admission  -has received lasix 40mg iv X 1 1700 day admission  -Na up with iv lasix. Not receive fluids.   - suspect 2/2 decreased solute intake, possible component of chf  -repeat Scott off lasix for 24 hours Scott <20 and U osmol 200 range.   -Na up to 131 with diuresis.     Plan:  - follow [facility]       Alcohol use:   -She drinks 6 drinks per day  -.  Felt tremulous in the Emergency Room.  She was given some Ativan.   -CIWA protocol started. Score 4---> 3-->9. Tremor, anxiety, agitation  -tremors from this am better. Needed ativan. Nebs may worse tremor.   -nonfocal neuro exam. No tremor currently    Plan:   - monitor CIWA protocol with p.r.n.  Ativan.  Her home dose of gabapentin will be continued.  Can also utilize Ativan as needed.  Chem dep consult  B12 nl.   - folate level. Thiamine, mv, folate      Depression, anxiety and chronic pain syndrome:    -Home medications will be continued, including her citalopram, gabapentin, Atarax at bedtime  as needed, and Seroquel both and at bedtime as needed, as well as her Remeron and trazodone.     Hypertension:   -decent control  - Chronic and stable on antihypertensives.  These will be continued, including her atenolol and losartan. Follow   Am bmp     Hyperlipidemia:  Chronic and stable on statin.  This will be continued.     DEEP VENOUS THROMBOSIS PROPHYLAXIS:  PCDs.     CODE STATUS:  Full code as was discussed with the patient and her daughter, Nery Brown, as her designated decision-maker on admission.     Dispo: seen by PT, home with assist. Outpatient PT    Nicho Marquez MD  Text Page  (7am to 6pm)  Interval History   NA up to 130 overnnight  Stable night. Not need bipap.   Seen by pulmonary  Feels better. Less cough. Less sob.     -Data reviewed today: I reviewed all new labs and imaging results over the last 24 hours. I personally reviewed imaging and labs since admission.     Physical Exam   Temp: 98  F (36.7  C) Temp src: Oral BP: 128/70 Pulse: 76   Resp: 20 SpO2: 99 % O2 Device: Nasal cannula Oxygen Delivery: 2 LPM  Vitals:    12/17/21 1225 12/17/21 1840 12/18/21 0455   Weight: 81.6 kg (180 lb) 85.3 kg (188 lb) 84.8 kg (187 lb)     Vital Signs with Ranges  Temp:  [98  F (36.7  C)-98.7  F (37.1  C)] 98  F (36.7  C)  Pulse:  [63-76] 76  Resp:  [20] 20  BP: (128-149)/(70-88) 128/70  SpO2:  [97 %-99 %] 99 %  I/O last 3 completed shifts:  In: 200 [P.O.:200]  Out: 1200 [Urine:1200]    Constitutional: nontoxic, looks better  Neck: ? Slight elevation jvp  Respiratory: Sounds a little better today. Bilateral wheezing with decrease airflow but improved today. Decreased BS L base  Cardiovascular: not tachy, RRR no r/g. 3/6 systolic murmur RUSB  GI: soft, nt, nd  Skin/Integumen: no rash or edema. No cyanosis  Psych; calm, pleasant, cooperative. No psychosis  Neuro: nl speech and  Mentation. Fully oriented. No tremor currently      Medications     - MEDICATION INSTRUCTIONS -       - MEDICATION INSTRUCTIONS -          aspirin  81 mg Oral Daily     atenolol  25 mg Oral Daily     azithromycin  500 mg Oral Daily     citalopram  20 mg Oral Daily     folic acid  1 mg Oral Daily     gabapentin  300 mg Oral TID     ipratropium - albuterol 0.5 mg/2.5 mg/3 mL  3 mL Nebulization Q4H     losartan  25 mg Oral Daily     mirtazapine  15 mg Oral At Bedtime     multivitamin w/minerals  1 tablet Oral Daily     pravastatin  20 mg Oral Daily     predniSONE  40 mg Oral BID w/meals     QUEtiapine  150 mg Oral At Bedtime     sodium chloride (PF)  3 mL Intracatheter Q8H     thiamine  100 mg Oral Daily       Data   Recent Labs   Lab 12/19/21  0858 12/18/21  2110 12/18/21  1432 12/18/21  0758 12/18/21  0340 12/17/21 2019 12/17/21  1402 12/17/21  1250   0000   WBC 7.2  --   --   --  7.3  --   --  8.4  --    HGB 10.2*  --   --   --  10.1*  --   --  11.0*  --    MCV 92  --   --   --  89  --   --  90  --      --   --   --  229  --   --  265  --    * 130* 128* 129* 129*  --  125*  --    < >   POTASSIUM 4.1  --   --  4.1 4.4  4.4   < > 3.2*  --   --    CHLORIDE 94  --   --  94 94  --  88*  --    < >   CO2 33*  --   --  31 31  --  30  --    < >   BUN 21  --   --  12 13  --  9  --    < >   CR 1.07*  --   --  0.90 0.81  --  0.84  --    < >   ANIONGAP 4  --   --  4 4  --  7  --    < >   BIRGIT 9.3  --   --  8.4* 8.5  --  9.2  --    < >   *  --   --  110* 111*  --  128*  --    < >   ALBUMIN  --   --   --   --   --   --  3.7  --   --    PROTTOTAL  --   --   --   --   --   --  8.4  --   --    BILITOTAL  --   --   --   --   --   --  0.4  --   --    ALKPHOS  --   --   --   --   --   --  134  --   --    ALT  --   --   --   --   --   --  17  --   --    AST  --   --   --   --   --   --  19  --   --     < > = values in this interval not displayed.       Imaging:   No results found for this or any previous visit (from the past 24 hour(s)).

## 2021-12-19 NOTE — PLAN OF CARE
Summary:   Elevated BNP, hyponatremia, hypoxia/SOB  DATE & TIME: 12/19/21, 0996-1833  Cognitive Concerns/ Orientation : A& O x4, anxious   BEHAVIOR & AGGRESSION TOOL COLOR: Green  CIWA SCORE:5,4,8 ativan given X1 (anxiety)  ABNL VS/O2: O2 mid/upper 90s on 1L, was off most of shift.   MOBILITY: Ax1/belt/walker, ambulated X1 & in chair X2  PAIN MANAGMENT: Tylenol given for headache  DIET: Low sat fat diet  BOWEL/BLADDER: Incontinent bladder, purewick IP. pt states slightly constipated at baseline, requested and given dulcolax x1 and miralax x1  ABNL LAB/BG: Sod 131, K+4.1 Mg 1.8 Phos 2.9   DRAIN/DEVICES: Purewick IP, PIV saline locked  TELEMETRY RHYTHM: NSR w/ 1st degree heart block  SKIN: Scattered bruises forearms  TESTS/PROCEDURES: N/A  D/C DAY/GOALS/PLACE: Pending labs, O2 needs  OTHER IMPORTANT INFO: Hx ETOH. Audible wheezing/getting nebs/prednisone. Pulmonary consulted.

## 2021-12-19 NOTE — PROGRESS NOTES
12/19/21 0900   Quick Adds   Type of Visit Initial PT Evaluation   Living Environment   People in home alone   Current Living Arrangements condominium   Home Accessibility stairs to enter home;stairs within home   Number of Stairs, Main Entrance 3  (2 in front, 3 in garage)   Stair Railings, Main Entrance none   Number of Stairs, Within Home, Primary greater than 10 stairs   Stair Railings, Within Home, Primary railings safe and in good condition   Transportation Anticipated family or friend will provide   Living Environment Comments Pt lives in a townhouse alone, SO is there on the weekends. All needs met main floor, has basment for storage. Has walk-in shower on main level.   Self-Care   Usual Activity Tolerance good   Current Activity Tolerance poor   Regular Exercise No   Equipment Currently Used at Home cane, straight   Activity/Exercise/Self-Care Comment IND with self cares and all ADLs/IADLs. Had been using SPC before coming to hospital d/t weakness, usually does not use.   Disability/Function   Hearing Difficulty or Deaf no   Wear Glasses or Blind no   Concentrating, Remembering or Making Decisions Difficulty no   Difficulty Communicating no   Difficulty Eating/Swallowing no   Walking or Climbing Stairs Difficulty yes   Walking or Climbing Stairs ambulation difficulty, requires equipment   Mobility Management owns SPC, usually uses no AD   Dressing/Bathing Difficulty no   Toileting issues no   Doing Errands Independently Difficulty (such as shopping) yes   Fall history within last six months no   General Information   Onset of Illness/Injury or Date of Surgery 12/17/21   Referring Physician Nicho Marquez MD   Patient/Family Therapy Goals Statement (PT) return home   Pertinent History of Current Problem (include personal factors and/or comorbidities that impact the POC) Linda Headley is a 76-year-old female with past medical history of hypertension, hyperlipidemia, chronic alcohol use, aortic stenosis,  depression/anxiety, and chronic pain syndrome, among other medical problems, who presents to Emergency Room [facility] for evaluation of shortness of breath.  She was hypoxic and currently needing 2 liters nasal cannula.  She was admitted to the hospital [facility] for ongoing evaluation and care.   Existing Precautions/Restrictions no known precautions/restrictions   Cognition   Orientation Status (Cognition) oriented x 3   Affect/Mental Status (Cognition) WNL   Follows Commands (Cognition) WNL   Pain Assessment   Patient Currently in Pain Yes, see Vital Sign flowsheet   Integumentary/Edema   Integumentary/Edema no deficits were identifed   Posture    Posture Forward head position;Protracted shoulders   Range of Motion (ROM)   ROM Comment UE/LE screened WFL   Strength   Strength Comments LE at least 3+/5 with transfers and functional mobility; able to perform SLR bilaterally   Bed Mobility   Comment (Bed Mobility) supine to sit with CGA   Transfers   Transfer Safety Comments sit to stand with CGA   Gait/Stairs (Locomotion)   Distance in Feet (Required for LE Total Joints) 50'   Comment (Gait/Stairs) ambulation with FWW and min A, attempted without AD with min A and unsteadiness noted   Balance   Balance Comments unable to stand without assist   Sensory Examination   Sensory Perception WNL   Clinical Impression   Criteria for Skilled Therapeutic Intervention yes, treatment indicated   PT Diagnosis (PT) impaired functional mobility    Influenced by the following impairments LE weakness, deconditioning, decreased activity tolerance, impaired balance    Functional limitations due to impairments difficulty with transfers, ambulation, functional mobility   Clinical Presentation Evolving/Changing   Clinical Presentation Rationale clinical judgement    Clinical Decision Making (Complexity) low complexity   Therapy Frequency (PT) Daily   Predicted Duration of Therapy Intervention (days/wks) 4 days    Planned Therapy Interventions  (PT) balance training;bed mobility training;gait training;home exercise program;neuromuscular re-education;patient/family education;ROM (range of motion);strengthening;stair training;transfer training   Anticipated Equipment Needs at Discharge (PT) other (see comments)  (pending progress, may consider FWW)   Risk & Benefits of therapy have been explained evaluation/treatment results reviewed;care plan/treatment goals reviewed;risks/benefits reviewed;current/potential barriers reviewed;participants voiced agreement with care plan;participants included;patient   PT Discharge Planning    PT Discharge Recommendation (DC Rec) home;home with assist;home with outpatient physical therapy   PT Rationale for DC Rec Patient is currently moderately below baseline functional mobility and activity tolerance. Patient independent with all functional mobility and ADLs at baseline. Pt currently performing bed mobility with CGA, transfers with CGA and FWW, and limited ambulation with FWW and min A. Pt would benefit from continued skilled PT services during inpatient stay for progression of functional mobility; anticipate patient to be safe to discharge home with proper AD and possibly assist pending medical clearance and progress during stay.   PT Brief overview of current status  CGA bed mobility, CGA transfers with FWW, limited ambulation with FWW and min A   Total Evaluation Time   Total Evaluation Time (Minutes) 12

## 2021-12-20 ENCOUNTER — APPOINTMENT (OUTPATIENT)
Dept: OCCUPATIONAL THERAPY | Facility: CLINIC | Age: 76
DRG: 202 | End: 2021-12-20
Payer: COMMERCIAL

## 2021-12-20 ENCOUNTER — APPOINTMENT (OUTPATIENT)
Dept: PHYSICAL THERAPY | Facility: CLINIC | Age: 76
DRG: 202 | End: 2021-12-20
Payer: COMMERCIAL

## 2021-12-20 PROBLEM — J45.909 REACTIVE AIRWAY DISEASE: Status: ACTIVE | Noted: 2021-12-20

## 2021-12-20 PROBLEM — J06.9 VIRAL URI: Status: ACTIVE | Noted: 2021-12-20

## 2021-12-20 LAB
ANION GAP SERPL CALCULATED.3IONS-SCNC: 4 MMOL/L (ref 3–14)
BUN SERPL-MCNC: 23 MG/DL (ref 7–30)
CALCIUM SERPL-MCNC: 9.8 MG/DL (ref 8.5–10.1)
CHLORIDE BLD-SCNC: 96 MMOL/L (ref 94–109)
CO2 SERPL-SCNC: 29 MMOL/L (ref 20–32)
CREAT SERPL-MCNC: 0.86 MG/DL (ref 0.52–1.04)
FOLATE RBC-MCNC: 1043 NG/ML
GFR SERPL CREATININE-BSD FRML MDRD: 65 ML/MIN/1.73M2
GLUCOSE BLD-MCNC: 158 MG/DL (ref 70–99)
POTASSIUM BLD-SCNC: 4.8 MMOL/L (ref 3.4–5.3)
SODIUM SERPL-SCNC: 129 MMOL/L (ref 133–144)

## 2021-12-20 PROCEDURE — 94640 AIRWAY INHALATION TREATMENT: CPT | Mod: 76

## 2021-12-20 PROCEDURE — 120N000001 HC R&B MED SURG/OB

## 2021-12-20 PROCEDURE — 999N000040 HC STATISTIC CONSULT NO CHARGE VASC ACCESS

## 2021-12-20 PROCEDURE — 94640 AIRWAY INHALATION TREATMENT: CPT

## 2021-12-20 PROCEDURE — 97530 THERAPEUTIC ACTIVITIES: CPT | Mod: GO | Performed by: OCCUPATIONAL THERAPIST

## 2021-12-20 PROCEDURE — 250N000013 HC RX MED GY IP 250 OP 250 PS 637: Performed by: INTERNAL MEDICINE

## 2021-12-20 PROCEDURE — 80048 BASIC METABOLIC PNL TOTAL CA: CPT | Performed by: INTERNAL MEDICINE

## 2021-12-20 PROCEDURE — H0001 ALCOHOL AND/OR DRUG ASSESS: HCPCS

## 2021-12-20 PROCEDURE — 97116 GAIT TRAINING THERAPY: CPT | Mod: GP

## 2021-12-20 PROCEDURE — 999N000157 HC STATISTIC RCP TIME EA 10 MIN

## 2021-12-20 PROCEDURE — 99233 SBSQ HOSP IP/OBS HIGH 50: CPT | Performed by: INTERNAL MEDICINE

## 2021-12-20 PROCEDURE — 250N000011 HC RX IP 250 OP 636: Performed by: INTERNAL MEDICINE

## 2021-12-20 PROCEDURE — 250N000009 HC RX 250: Performed by: INTERNAL MEDICINE

## 2021-12-20 PROCEDURE — 97530 THERAPEUTIC ACTIVITIES: CPT | Mod: GP

## 2021-12-20 PROCEDURE — 250N000012 HC RX MED GY IP 250 OP 636 PS 637: Performed by: INTERNAL MEDICINE

## 2021-12-20 PROCEDURE — 999N000128 HC STATISTIC PERIPHERAL IV START W/O US GUIDANCE

## 2021-12-20 PROCEDURE — 36415 COLL VENOUS BLD VENIPUNCTURE: CPT | Performed by: INTERNAL MEDICINE

## 2021-12-20 PROCEDURE — 250N000013 HC RX MED GY IP 250 OP 250 PS 637: Performed by: NURSE PRACTITIONER

## 2021-12-20 RX ORDER — FUROSEMIDE 20 MG
20 TABLET ORAL DAILY
Status: DISCONTINUED | OUTPATIENT
Start: 2021-12-20 | End: 2021-12-21

## 2021-12-20 RX ADMIN — IPRATROPIUM BROMIDE AND ALBUTEROL SULFATE 3 ML: .5; 3 SOLUTION RESPIRATORY (INHALATION) at 19:56

## 2021-12-20 RX ADMIN — MELATONIN 5 MG TABLET 5 MG: at 23:04

## 2021-12-20 RX ADMIN — MULTIPLE VITAMINS W/ MINERALS TAB 1 TABLET: TAB at 08:58

## 2021-12-20 RX ADMIN — IPRATROPIUM BROMIDE AND ALBUTEROL SULFATE 3 ML: .5; 3 SOLUTION RESPIRATORY (INHALATION) at 15:49

## 2021-12-20 RX ADMIN — IPRATROPIUM BROMIDE AND ALBUTEROL SULFATE 3 ML: .5; 3 SOLUTION RESPIRATORY (INHALATION) at 00:29

## 2021-12-20 RX ADMIN — PREDNISONE 40 MG: 20 TABLET ORAL at 18:21

## 2021-12-20 RX ADMIN — ATENOLOL 25 MG: 25 TABLET ORAL at 08:57

## 2021-12-20 RX ADMIN — MIRTAZAPINE 15 MG: 15 TABLET, FILM COATED ORAL at 22:04

## 2021-12-20 RX ADMIN — LORAZEPAM 1 MG: 1 TABLET ORAL at 19:18

## 2021-12-20 RX ADMIN — FOLIC ACID 1 MG: 1 TABLET ORAL at 08:59

## 2021-12-20 RX ADMIN — LORAZEPAM 1 MG: 1 TABLET ORAL at 13:43

## 2021-12-20 RX ADMIN — LORAZEPAM 1 MG: 1 TABLET ORAL at 16:08

## 2021-12-20 RX ADMIN — ASPIRIN 81 MG: 81 TABLET, COATED ORAL at 08:56

## 2021-12-20 RX ADMIN — LORAZEPAM 1 MG: 1 TABLET ORAL at 23:08

## 2021-12-20 RX ADMIN — CITALOPRAM HYDROBROMIDE 20 MG: 20 TABLET ORAL at 08:59

## 2021-12-20 RX ADMIN — GABAPENTIN 300 MG: 300 CAPSULE ORAL at 22:04

## 2021-12-20 RX ADMIN — IPRATROPIUM BROMIDE AND ALBUTEROL SULFATE 3 ML: .5; 3 SOLUTION RESPIRATORY (INHALATION) at 11:45

## 2021-12-20 RX ADMIN — AZITHROMYCIN MONOHYDRATE 500 MG: 250 TABLET ORAL at 08:58

## 2021-12-20 RX ADMIN — LOSARTAN POTASSIUM 25 MG: 25 TABLET, FILM COATED ORAL at 08:57

## 2021-12-20 RX ADMIN — GABAPENTIN 300 MG: 300 CAPSULE ORAL at 08:59

## 2021-12-20 RX ADMIN — PREDNISONE 40 MG: 20 TABLET ORAL at 08:59

## 2021-12-20 RX ADMIN — ACETAMINOPHEN 650 MG: 325 TABLET, FILM COATED ORAL at 23:04

## 2021-12-20 RX ADMIN — SENNOSIDES AND DOCUSATE SODIUM 2 TABLET: 50; 8.6 TABLET ORAL at 19:18

## 2021-12-20 RX ADMIN — BISACODYL 5 MG: 5 TABLET, COATED ORAL at 19:18

## 2021-12-20 RX ADMIN — POLYETHYLENE GLYCOL 3350 17 G: 17 POWDER, FOR SOLUTION ORAL at 19:18

## 2021-12-20 RX ADMIN — THIAMINE HCL TAB 100 MG 100 MG: 100 TAB at 08:57

## 2021-12-20 RX ADMIN — FUROSEMIDE 20 MG: 20 TABLET ORAL at 11:08

## 2021-12-20 RX ADMIN — ENOXAPARIN SODIUM 40 MG: 40 INJECTION SUBCUTANEOUS at 11:09

## 2021-12-20 RX ADMIN — GABAPENTIN 300 MG: 300 CAPSULE ORAL at 16:10

## 2021-12-20 RX ADMIN — QUETIAPINE FUMARATE 150 MG: 150 TABLET, EXTENDED RELEASE ORAL at 22:04

## 2021-12-20 RX ADMIN — PRAVASTATIN SODIUM 20 MG: 20 TABLET ORAL at 08:59

## 2021-12-20 RX ADMIN — IPRATROPIUM BROMIDE AND ALBUTEROL SULFATE 3 ML: .5; 3 SOLUTION RESPIRATORY (INHALATION) at 08:08

## 2021-12-20 ASSESSMENT — ACTIVITIES OF DAILY LIVING (ADL)
ADLS_ACUITY_SCORE: 9
ADLS_ACUITY_SCORE: 7
ADLS_ACUITY_SCORE: 9
ADLS_ACUITY_SCORE: 9
ADLS_ACUITY_SCORE: 7
ADLS_ACUITY_SCORE: 8
ADLS_ACUITY_SCORE: 7
ADLS_ACUITY_SCORE: 9
ADLS_ACUITY_SCORE: 7
ADLS_ACUITY_SCORE: 9
ADLS_ACUITY_SCORE: 7
ADLS_ACUITY_SCORE: 9
ADLS_ACUITY_SCORE: 9
ADLS_ACUITY_SCORE: 7
ADLS_ACUITY_SCORE: 9
DEPENDENT_IADLS:: INDEPENDENT
ADLS_ACUITY_SCORE: 9
ADLS_ACUITY_SCORE: 9
ADLS_ACUITY_SCORE: 7
ADLS_ACUITY_SCORE: 7

## 2021-12-20 ASSESSMENT — MIFFLIN-ST. JEOR: SCORE: 1322.3

## 2021-12-20 NOTE — CONSULTS
12/20/2021    Pt has been interviewed via [employer] and CD Consult has been completed. Email has been sent to pt with CD resources.     Recommendations:      1. Abstain from all non-prescribed mood-altering substances  2. Take all medications as prescribed  3. Enter and complete a CD treatment program if unable to stay sober  4. Increase sober support, attend AA meetings at least one time weekly        5.   Follow all recommendations of your medical providers        Clinical Substantiation:       Pt meets criteria for Alcohol use disorder-severe. Pt reports she attended [facility] CD treatment last spring (2021) and was sober until September 2021. Pt reports her use has increased up to one Liter of Corin every 4 days. Pt reports she has attended AA meetings in the past and has not been going recently. Pt reports she would not be interested in attending IP CD treatment at this time. Pt reports she would like to resume attending AA meetings regularly. Pt reports she would like to receive CD resources so she is able to follow up if the would like to attend CD treatment.      Referrals/ Alternatives:    [place]  [employer]  [email or web address]    [facility] - accepts medicare for Outpatient CD treatment  Phone: [phone] (esha)  Fax: [phone]    Supportive Services   [facility]  Https://www.smartrecovery.org      Alcoholics Anonymous  http://www.aa.org  Community Drug & Alcohol Support Resources   Alcoholics Anonymous   24/7 Phone Line: 313.340.7319   https://[place].org/   https://[place].org/   For additional list of online meetings: http://aa-intergroup.org     [place] [facility] [address] [address]   Phone: [phone]  [email or web address]     [facility] - IP part of [facility] (men and women)  Phone: [phone]  Fax: [phone] for [place] (inpatient)  Fax: [phone] for [place] (inpatient)  Per fast  tracker:  Phone: [phone]  Fax: [phone]     [facility] with housing options accepts Medicare  Call: [phone]  Treatment Center Fax: [phone]     [facility] consult completed by: DAVON Brar  Phone Number: [phone]  E-mail Address: [email or web address] and Addiction Services Evaluation Department  [address] [place] [address]

## 2021-12-20 NOTE — PROGRESS NOTES
[facility]    Hospitalist Progress Note    Assessment & Plan     ASSESSMENT AND PLAN:  Linda Headley is a 76-year-old female with past medical history of hypertension, hyperlipidemia, chronic alcohol use, aortic stenosis, depression/anxiety, and chronic pain syndrome, among other medical problems, who presents to Emergency Room [facility] for evaluation of shortness of breath.  She was hypoxic and currently needing 2 liters nasal cannula.  She was admitted to the hospital [facility] for ongoing evaluation and care.     Acute hypoxic respiratory failure    Suspect 2/2 Viral URI with Reactive Airway Disease    Possible component of mild chf (mod-severe Aortic stenosis)    -She is maintained on daily oral Lasix, which she is compliant with.   - Her EF was intact on recent echocardiogram in 10/2021.  It was reported that RV systolic function was also normal.  She does have noted moderate aortic stenosis.  Interestingly enough, given her hypoxia and need for 2 liters nasal cannula with coarse-sounding breath sounds, her imaging is not really revealing.  There is no evidence of focal consolidation or infiltrate.  No overt evidence of pulmonary edema, and studies were negative for PE.   - Her proBNP is elevated tonight in comparison to priors, so this would maybe suggest some component of volume overload.    -given Lasix 40 mg IV in the Emergency Room on admission  -procal negative  -I/O -810 ml.   Wt 81.6kg---> 85/84kg.   -started on nebs qid, may be contributing to tremulousness  -covid and influenza pcr negative.     Dry to mildly productive cough for 1 week. New wheezing and sob  Significant other with similar sxs  Not feeling much better. Nebs help slightly  Decrease po intake     -started on steroids 12/18. Seen by pulmonary. Azithromycin started for tracheobronchitis    Suspect viral URI/tracheobronchitis with RAD    -slow improvement in wheezing, sob, cough oxygen needs since admission    Today.  "  Down to 1L NC. Still with bilateral wheezing, improved airmovement since Saturday but stable from yesterday  I/O -1.9 liters  Suspect euvolemic at this time.      Plan;    -  prednisone 40mg bid -cont current dose  -duoneb q4 hours for now  --I/O, daily wt  - IS, wean oxygen  - am labs  -pulmonary following.   -Azithromycin course 5 days.   -resume PTA lasix 20mg every day today.     Addendum; off oxygen. 98%RA rest, 90% RA post walking. Up with PT         Hypokalemia:   Hypomagnesemia  Both resolved.   - Presumed secondary to decreased oral intake in the recent week in the setting of acute illness as well as the use of Lasix.  Potassium replacement has been ordered. Replaced K and Mg per protocol and normalized  - follow for now daily recheck am  -follow lytes daily    Hyponatremia: improving    -Thought to be possibly multifactorial related to alcohol use.   - She does not look overtly volume overloaded, but again some of her symptoms including orthopnea do sounds suggestive of volume overload.    -Na \125--> 129 ----> 131--> 129 today  --K now nl  -Urine Na 53 post lasix. Uosm 322. After lasix on admission  -has received lasix 40mg iv X 1 1700 day admission  -Na up with iv lasix. Not receive fluids.   - suspect 2/2 decreased solute intake, possible component of chf  -repeat Scott off lasix for 24 hours Scott <20 and U osmol 200 range.   -Na up to 131 with diuresis.   -Na 129 today.   -taking po/solute    Plan:  - follow bmp. Restart po lasix        Alcohol use disorder-severe  -She drinks 6 drinks per day  -.  Felt tremulous in the Emergency Room.  She was given some Ativan.   -CIWA protocol started. Score 4---> 3-->9. Tremor, anxiety, agitation  -tremors from this am better. Needed ativan. Nebs may worse tremor.   -nonfocal neuro exam. No tremor currently  - seen by chem Dep 12/20:   Per their note: \"Pt meets criteria for Alcohol use disorder-severe. Pt reports she attended [employer]  treatment last spring (2021) " "and was sober until September 2021. Pt reports her use has increased up to one Liter of Corin every 4 days. Pt reports she has attended AA meetings in the past and has not been going recently. Pt reports she would not be interested in attending IP CD treatment at this time. Pt reports she would like to resume attending AA meetings regularly. Pt reports she would like to receive CD resources so she is able to follow up if the would like to attend CD treatment.\"  Plan:   - monitor CIWA protocol with p.r.n.  Ativan.  Her home dose of gabapentin will be continued.  Can also utilize Ativan as needed.  Chem dep consult  B12 and folate level nl  - Thiamine, mv, folate      Depression, anxiety and chronic pain syndrome:    -Home medications will be continued, including her citalopram, gabapentin, Atarax at bedtime as needed, and Seroquel both and at bedtime as needed, as well as her Remeron and trazodone.     Hypertension:   -decent control  - Chronic and stable on antihypertensives.  These will be continued, including her atenolol and losartan. Follow   Am bmp     Hyperlipidemia:  Chronic and stable on statin.  This will be continued.     DEEP VENOUS THROMBOSIS PROPHYLAXIS:  PCDs. Pt ambulatory yesterday. Will add lovenox for proph     CODE STATUS:  Full code as was discussed with the patient and her daughter, Nery Brown, as her designated decision-maker on admission.     Dispo: seen by PT, home with assist. Outpatient PT    Nicho Marquez MD  Text Page  (7am to 6pm)  Interval History   NA up to 130 overnnight  Stable night. Not need bipap.   Seen by pulmonary  Feels better. Less cough. Less sob.   Walked in [place] multiple times yestderday    -Data reviewed today: I reviewed all new labs and imaging results over the last 24 hours. I personally reviewed imaging and labs since admission.     Physical Exam   Temp: 97.6  F (36.4  C) Temp src: Axillary BP: (!) 150/82 Pulse: 74   Resp: 22 SpO2: 100 % O2 Device: Nasal " cannula Oxygen Delivery: 4 LPM  Vitals:    12/17/21 1840 12/18/21 0455 12/20/21 0616   Weight: 85.3 kg (188 lb) 84.8 kg (187 lb) 85.7 kg (189 lb)     Vital Signs with Ranges  Temp:  [97.6  F (36.4  C)-98  F (36.7  C)] 97.6  F (36.4  C)  Pulse:  [64-74] 74  Resp:  [20-22] 22  BP: (136-155)/(73-88) 150/82  SpO2:  [96 %-100 %] 100 %  I/O last 3 completed shifts:  In: 880 [P.O.:880]  Out: 650 [Urine:650]    Constitutional: nontoxic, looks better overall   Respiratory: Sounds a little better since 12/18. Bilateral wheezing with decrease airflow but some improved air movement. Decreased BS L base  Cardiovascular: not tachy, RRR no r/g. 3/6 systolic murmur RUSB  GI: soft, nt, nd  Skin/Integumen: no rash or edema. No cyanosis  Psych; calm, pleasant, cooperative. No psychosis  Neuro: nl speech and  Mentation. Fully oriented. No tremor currently      Medications     - MEDICATION INSTRUCTIONS -       - MEDICATION INSTRUCTIONS -         aspirin  81 mg Oral Daily     atenolol  25 mg Oral Daily     citalopram  20 mg Oral Daily     folic acid  1 mg Oral Daily     gabapentin  300 mg Oral TID     ipratropium - albuterol 0.5 mg/2.5 mg/3 mL  3 mL Nebulization Q4H     losartan  25 mg Oral Daily     mirtazapine  15 mg Oral At Bedtime     multivitamin w/minerals  1 tablet Oral Daily     pravastatin  20 mg Oral Daily     predniSONE  40 mg Oral BID w/meals     QUEtiapine  150 mg Oral At Bedtime     sodium chloride (PF)  3 mL Intracatheter Q8H     thiamine  100 mg Oral Daily       Data   Recent Labs   Lab 12/20/21  0744 12/19/21  0858 12/18/21  2110 12/18/21  1432 12/18/21  0758 12/18/21  0340 12/17/21  2019 12/17/21  1402 12/17/21  1250   0000   WBC  --  7.2  --   --   --  7.3  --   --  8.4  --    HGB  --  10.2*  --   --   --  10.1*  --   --  11.0*  --    MCV  --  92  --   --   --  89  --   --  90  --    PLT  --  255  --   --   --  229  --   --  265  --    * 131* 130*   < > 129* 129*  --  125*  --    < >   POTASSIUM 4.8 4.1  --   --   4.1 4.4  4.4   < > 3.2*  --   --    CHLORIDE 96 94  --   --  94 94  --  88*  --    < >   CO2 29 33*  --   --  31 31  --  30  --    < >   BUN 23 21  --   --  12 13  --  9  --    < >   CR 0.86 1.07*  --   --  0.90 0.81  --  0.84  --    < >   ANIONGAP 4 4  --   --  4 4  --  7  --    < >   BIRGIT 9.8 9.3  --   --  8.4* 8.5  --  9.2  --    < >   * 136*  --   --  110* 111*  --  128*  --    < >   ALBUMIN  --   --   --   --   --   --   --  3.7  --   --    PROTTOTAL  --   --   --   --   --   --   --  8.4  --   --    BILITOTAL  --   --   --   --   --   --   --  0.4  --   --    ALKPHOS  --   --   --   --   --   --   --  134  --   --    ALT  --   --   --   --   --   --   --  17  --   --    AST  --   --   --   --   --   --   --  19  --   --     < > = values in this interval not displayed.       Imaging:   No results found for this or any previous visit (from the past 24 hour(s)).
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
## 2021-12-20 NOTE — CONSULTS
Care Management Initial Consult    General Information  Assessment completed with: Patient,    Type of CM/SW Visit: Initial Assessment    Primary Care Provider verified and updated as needed: Yes   Readmission within the last 30 days:        Reason for Consult: discharge planning  Advance Care Planning: Advance Care Planning Reviewed: no concerns identified     General Information Comments: High readmission risk    Communication Assessment  Patient's communication style: spoken language (English or Bilingual)    Hearing Difficulty or Deaf: no   Wear Glasses or Blind: no    Cognitive  Cognitive/Neuro/Behavioral: WDL                      Living Environment:   People in home: alone     Current living Arrangements: condominium      Able to return to prior arrangements: yes  Living Arrangement Comments: Pt's boyfriend stays with pt Friday to Monday    Family/Social Support:  Care provided by: self  Provides care for: no one     Significant Other,Children          Description of Support System: Supportive,Involved         Current Resources:   Patient receiving home care services: No     Community Resources: None  Equipment currently used at home: none  Supplies currently used at home: None    Employment/Financial:  Employment Status: retired     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  Financial Concerns: No concerns identified           Lifestyle & Psychosocial Needs:  Social Determinants of Health     Tobacco Use: Low Risk      Smoking Tobacco Use: Never Smoker     Smokeless Tobacco Use: Never Used   Alcohol Use: Not on file   Financial Resource Strain: Not on file   Food Insecurity: Not on file   Transportation Needs: Not on file   Physical Activity: Not on file   Stress: Not on file   Social Connections: Not on file   Intimate Partner Violence: Not on file   Depression: Not at risk     PHQ-2 Score: 2   Housing Stability: Not on file       Functional Status:  Prior to admission  patient needed assistance:   Dependent ADLs:: Independent  Dependent IADLs:: Independent  Assesssment of Functional Status: Not at baseline with mobility    Mental Health Status:  Mental Health Status: No Current Concerns       Chemical Dependency Status:  Chemical Dependency Status: Current Concern  Reportedly, pt drinks 6 alcoholic drinks per day.  She wants to go back to AA.  CD assessment was done.  Pt does not want any treatment at this time, but was open to having the contact information if she changes her mind on wanting help for her alcoholism.            Values/Beliefs:  Spiritual, Cultural Beliefs, Adventism Practices, Values that affect care:   NA              Additional Information:  Per rounding Hospitalist, pt's discharge is not anticipated for a few days yet due to ongoing wheezing.  Pt is now weaned off O2.  Therapies are recommending home with assist.  Pt is thinking that she will be at baseline in a few days.  If she is still needing some assistance, she felt her daughter or granddaughter would be able to help her more.  Pt's significant other cares for his mother during the week and then stays with pt Friday til Monday.  Pt does not feel she has any home care needs.    Pt is currently on nebs.  She will need a nebulizer ordered if she discharges on nebs.  A virtual follow-up appointment has been scheduled for pt on 12/30/21 at 10:25 AM.    Griselda Ocasio RN   Inpatient Care Management  [phone]

## 2021-12-20 NOTE — PLAN OF CARE
DATE & TIME: 12/20/2021 days     Cognitive Concerns/ Orientation : alert and oriented x 4    BEHAVIOR & AGGRESSION TOOL COLOR:  green   CIWA SCORE:   6 8  ABNL VS/O2: BP elevated RA   MOBILITY: up with assistance of 1 and walker   PAIN MANAGMENT: denies pain   DIET:  LSF low Na   BOWEL/BLADDER:  continent   ABNL LAB/BG: Na 129  DRAIN/DEVICES: SL   TELEMETRY RHYTHM:  SR with 1 degree block   SKIN:  scattered bruises   TESTS/PROCEDURES:  none   D/C DATE:  possibly 1-2 days   Discharge Barriers:  pt is still wheezing   OTHER IMPORTANT INFO:  pt very wheezy this am Na 129 encourage food intake. Pt ambulated with PT and did well, pt declined to walk again this afternoon.

## 2021-12-20 NOTE — PROGRESS NOTES
"PULMONOLOGY PROGRESS NOTE    Date of Admission: 12/17/2021    CC/Reason for Hospital visit: hypoxemia, bronchospasm  SUBJECTIVE      Events of WE reviewed. Stable night. No new respiratory problems. States breathing is better today.    ROS: A Problem Pertinent review of systems was negative except for items noted in HPI.  Past Medical, Family, and Social/Substance History has been reviewed: No interval changes.    OBJECTIVE   Vital signs:  Temp: 97.6  F (36.4  C) Temp src: Axillary BP: (!) 150/82 Pulse: 74   Resp: 22 SpO2: 100 % O2 Device: Nasal cannula Oxygen Delivery: 4 LPM Height: 162.6 cm (5' 4\") Weight: 85.7 kg (189 lb)  Estimated body mass index is 32.44 kg/m  as calculated from the following:    Height as of this encounter: 1.626 m (5' 4\").    Weight as of this encounter: 85.7 kg (189 lb).        I/O last 3 completed shifts:  In: 880 [P.O.:880]  Out: 650 [Urine:650]      CONSTITUTIONAL/GENERAL APPEARANCE: Alert female. No Apparent Distress.  PSYCHIATRIC: Pleasant and appropriate mood and affect. Oriented x 3.  EARS, NOSE,THROAT,MOUTH: External ears and nose overall normal. Normal oral mucosa.   NECK: Neck appearance normal. No neck masses and the thyroid is not enlarged.   RESPIRATORY: Non-labored effort. Decreased BS, mild exp wheezes, prolonged exp phase.  CARDIOVASCULAR: S1, S2, regular rate and rhythm.      LABORATORY ASSESSMENT    Arterial Blood Gas  Recent Labs   Lab 12/18/21  1432 12/17/21  1312   O2PER 35 3     CBC  Recent Labs   Lab 12/19/21  0858 12/18/21  0340 12/17/21  1250   WBC 7.2 7.3 8.4   RBC 3.42* 3.37* 3.67*   HGB 10.2* 10.1* 11.0*   HCT 31.4* 30.0* 33.0*   MCV 92 89 90   MCH 29.8 30.0 30.0   MCHC 32.5 33.7 33.3   RDW 14.7 14.1 13.8    229 265     BMP  Recent Labs   Lab 12/20/21  0744 12/19/21  0858 12/18/21  2110 12/18/21  1432 12/18/21  0758 12/18/21  0340   * 131* 130* 128* 129* 129*   POTASSIUM 4.8 4.1  --   --  4.1 4.4  4.4   CHLORIDE 96 94  --   --  94 94   BIRGIT 9.8 9.3  " --   --  8.4* 8.5   CO2 29 33*  --   --  31 31   BUN 23 21  --   --  12 13   CR 0.86 1.07*  --   --  0.90 0.81   * 136*  --   --  110* 111*     INRNo lab results found in last 7 days.   BNPNo lab results found in last 7 days.  VENOUS BLOOD GASES  Recent Labs   Lab 12/18/21  1432 12/17/21  1312   PHV 7.37 7.36   PCO2V 56* 58*   PO2V 56* 34   HCO3V 32* 32*   MOSHE 5.6* 5.3*       Additional labs and/or comments:    IMAGING      CT Chest 12/17 -  IMPRESSION:  1.  No evidence of pulmonary embolism.  2.  Dilated main pulmonary arteries suggest chronic pulmonary arterial hypertension.  3.  Ectatic ascending thoracic aorta at 4.1 cm.  4.  Cardiomegaly, but no pleural effusion or evidence of pulmonary edema.    CXR 12/17 -  IMPRESSION: Prominent heart size similar to prior. Pulmonary  vascularity within normal limits. No airspace consolidation,  pneumothorax, or pleural effusion.    PFT & OTHER TESTING       ASSESSMENT / PLAN      Pulmonary Diagnoses:  Bronchitis acute  Cough R05  Hypoxemia R09.02  Resp fail acute J96.00  SOB R06.02  Bronchospasm    Additional COVID-19 diagnoses:  Concern of possible exposure to COVID-19, Now RULED OUT Z03.818    ASSESSMENT: 76-year-old female non-smoker, history of moderate aortic stenosis, chronic alcohol use, chronic pain syndrome, admitted with cough, wheezing and shortness of breath status post recent upper respiratory infection.  Chest x-ray on admission was clear.  CT scan of the chest was negative for PE and demonstrated dilated main pulmonary arteries consistent with chronic pulmonary hypertension but clear lung fields bilaterally.  Acute hypoxemic respiratory failure likely secondary to viral upper respiratory infection complicated by bronchospasm (reactive airways disease/bronchial hyperreactivity).  Patient has been treated with bronchodilators, antibiotics and steroids with slow clinical improvement.  Continue present rx for now.  Respiratory status currently stable on  nasal cannula oxygen.    PLAN:  1. Adjust oxygen, keep SaO2 > 90%  2. Bronchodilators - DuoNebs.  3. Antibiotics - Azithromycin  4. Steroids - Prednisone  5. Increase activity and IS.      Nash Ventura M.D.    [facility]  [phone]   (pager)  [phone]   (office)

## 2021-12-20 NOTE — PLAN OF CARE
Cognitive Concerns/ Orientation : Alert and oriented x 4   BEHAVIOR & AGGRESSION TOOL COLOR: Green  CIWA SCORE: 5-3 for slight tremor, anxiety and mainly for headache   ABNL VS/O2: VSS on 2 lpm NC 98%  MOBILITY: SBA with walker/gait belt  PAIN MANAGMENT: Tylenol given for headache  DIET: Low sat fat diet  BOWEL/BLADDER: Continent, up to bathroom, constipated, Miralax, senna and dulcolax given per patients request   ABNL LAB/BG: Sodium 131, K+ 4.1 Mg 1.8 Phos 2.9   DRAIN/DEVICES: Peripheral IV SL left upper arm   TELEMETRY RHYTHM: SR w/ 1st degree heart block  SKIN: Scattered bruises forearms  TESTS/PROCEDURES: N/A  D/C DAY/GOALS/PLACE: Pending labs, O2 needs  OTHER IMPORTANT INFO: Hx ETOH. Audible wheezing/getting nebs/prednisone. Pulmonary consulted.

## 2021-12-21 ENCOUNTER — APPOINTMENT (OUTPATIENT)
Dept: OCCUPATIONAL THERAPY | Facility: CLINIC | Age: 76
DRG: 202 | End: 2021-12-21
Payer: COMMERCIAL

## 2021-12-21 ENCOUNTER — APPOINTMENT (OUTPATIENT)
Dept: PHYSICAL THERAPY | Facility: CLINIC | Age: 76
DRG: 202 | End: 2021-12-21
Payer: COMMERCIAL

## 2021-12-21 LAB
ANION GAP SERPL CALCULATED.3IONS-SCNC: 3 MMOL/L (ref 3–14)
BUN SERPL-MCNC: 27 MG/DL (ref 7–30)
CALCIUM SERPL-MCNC: 9.5 MG/DL (ref 8.5–10.1)
CHLORIDE BLD-SCNC: 93 MMOL/L (ref 94–109)
CO2 SERPL-SCNC: 34 MMOL/L (ref 20–32)
CREAT SERPL-MCNC: 1.03 MG/DL (ref 0.52–1.04)
ERYTHROCYTE [DISTWIDTH] IN BLOOD BY AUTOMATED COUNT: 14.6 % (ref 10–15)
GFR SERPL CREATININE-BSD FRML MDRD: 52 ML/MIN/1.73M2
GLUCOSE BLD-MCNC: 107 MG/DL (ref 70–99)
HCT VFR BLD AUTO: 30.9 % (ref 35–47)
HGB BLD-MCNC: 10.2 G/DL (ref 11.7–15.7)
MAGNESIUM SERPL-MCNC: 1.8 MG/DL (ref 1.6–2.3)
MCH RBC QN AUTO: 30.2 PG (ref 26.5–33)
MCHC RBC AUTO-ENTMCNC: 33 G/DL (ref 31.5–36.5)
MCV RBC AUTO: 91 FL (ref 78–100)
PHOSPHATE SERPL-MCNC: 3.2 MG/DL (ref 2.5–4.5)
PLATELET # BLD AUTO: 325 10E3/UL (ref 150–450)
POTASSIUM BLD-SCNC: 4.8 MMOL/L (ref 3.4–5.3)
RBC # BLD AUTO: 3.38 10E6/UL (ref 3.8–5.2)
SODIUM SERPL-SCNC: 130 MMOL/L (ref 133–144)
WBC # BLD AUTO: 9.2 10E3/UL (ref 4–11)

## 2021-12-21 PROCEDURE — 250N000012 HC RX MED GY IP 250 OP 636 PS 637: Performed by: INTERNAL MEDICINE

## 2021-12-21 PROCEDURE — 250N000011 HC RX IP 250 OP 636: Performed by: INTERNAL MEDICINE

## 2021-12-21 PROCEDURE — 99233 SBSQ HOSP IP/OBS HIGH 50: CPT | Performed by: INTERNAL MEDICINE

## 2021-12-21 PROCEDURE — 94640 AIRWAY INHALATION TREATMENT: CPT

## 2021-12-21 PROCEDURE — 120N000001 HC R&B MED SURG/OB

## 2021-12-21 PROCEDURE — 85027 COMPLETE CBC AUTOMATED: CPT | Performed by: INTERNAL MEDICINE

## 2021-12-21 PROCEDURE — 250N000009 HC RX 250: Performed by: INTERNAL MEDICINE

## 2021-12-21 PROCEDURE — 250N000013 HC RX MED GY IP 250 OP 250 PS 637: Performed by: INTERNAL MEDICINE

## 2021-12-21 PROCEDURE — 94640 AIRWAY INHALATION TREATMENT: CPT | Mod: 76

## 2021-12-21 PROCEDURE — 999N000157 HC STATISTIC RCP TIME EA 10 MIN

## 2021-12-21 PROCEDURE — 80048 BASIC METABOLIC PNL TOTAL CA: CPT | Performed by: INTERNAL MEDICINE

## 2021-12-21 PROCEDURE — 97116 GAIT TRAINING THERAPY: CPT | Mod: GP | Performed by: PHYSICAL THERAPIST

## 2021-12-21 PROCEDURE — 84100 ASSAY OF PHOSPHORUS: CPT | Performed by: INTERNAL MEDICINE

## 2021-12-21 PROCEDURE — 97535 SELF CARE MNGMENT TRAINING: CPT | Mod: GO | Performed by: OCCUPATIONAL THERAPIST

## 2021-12-21 PROCEDURE — 36415 COLL VENOUS BLD VENIPUNCTURE: CPT | Performed by: INTERNAL MEDICINE

## 2021-12-21 PROCEDURE — 97530 THERAPEUTIC ACTIVITIES: CPT | Mod: GP | Performed by: PHYSICAL THERAPIST

## 2021-12-21 PROCEDURE — 83735 ASSAY OF MAGNESIUM: CPT | Performed by: INTERNAL MEDICINE

## 2021-12-21 RX ORDER — LOSARTAN POTASSIUM 50 MG/1
50 TABLET ORAL DAILY
Status: DISCONTINUED | OUTPATIENT
Start: 2021-12-22 | End: 2021-12-27 | Stop reason: HOSPADM

## 2021-12-21 RX ORDER — BUDESONIDE 0.5 MG/2ML
1 INHALANT ORAL 2 TIMES DAILY
Status: DISCONTINUED | OUTPATIENT
Start: 2021-12-21 | End: 2021-12-27 | Stop reason: HOSPADM

## 2021-12-21 RX ORDER — GUAIFENESIN/DEXTROMETHORPHAN 100-10MG/5
10 SYRUP ORAL EVERY 4 HOURS PRN
Status: DISCONTINUED | OUTPATIENT
Start: 2021-12-21 | End: 2021-12-27 | Stop reason: HOSPADM

## 2021-12-21 RX ORDER — LOSARTAN POTASSIUM 25 MG/1
25 TABLET ORAL ONCE
Status: COMPLETED | OUTPATIENT
Start: 2021-12-21 | End: 2021-12-21

## 2021-12-21 RX ORDER — BUDESONIDE 1 MG/2ML
1 INHALANT ORAL 2 TIMES DAILY
Status: DISCONTINUED | OUTPATIENT
Start: 2021-12-21 | End: 2021-12-21 | Stop reason: RX

## 2021-12-21 RX ORDER — FUROSEMIDE 20 MG
20 TABLET ORAL DAILY
Status: DISCONTINUED | OUTPATIENT
Start: 2021-12-21 | End: 2021-12-27 | Stop reason: HOSPADM

## 2021-12-21 RX ADMIN — GABAPENTIN 300 MG: 300 CAPSULE ORAL at 16:20

## 2021-12-21 RX ADMIN — ENOXAPARIN SODIUM 40 MG: 40 INJECTION SUBCUTANEOUS at 10:22

## 2021-12-21 RX ADMIN — GABAPENTIN 300 MG: 300 CAPSULE ORAL at 21:09

## 2021-12-21 RX ADMIN — LORAZEPAM 1 MG: 1 TABLET ORAL at 09:49

## 2021-12-21 RX ADMIN — BUDESONIDE 1 MG: 0.5 INHALANT RESPIRATORY (INHALATION) at 19:21

## 2021-12-21 RX ADMIN — FUROSEMIDE 20 MG: 20 TABLET ORAL at 10:22

## 2021-12-21 RX ADMIN — LORAZEPAM 1 MG: 1 TABLET ORAL at 15:01

## 2021-12-21 RX ADMIN — ASPIRIN 81 MG: 81 TABLET, COATED ORAL at 08:53

## 2021-12-21 RX ADMIN — LOSARTAN POTASSIUM 25 MG: 25 TABLET, FILM COATED ORAL at 08:53

## 2021-12-21 RX ADMIN — PREDNISONE 40 MG: 20 TABLET ORAL at 08:53

## 2021-12-21 RX ADMIN — FOLIC ACID 1 MG: 1 TABLET ORAL at 08:53

## 2021-12-21 RX ADMIN — GABAPENTIN 300 MG: 300 CAPSULE ORAL at 08:53

## 2021-12-21 RX ADMIN — QUETIAPINE FUMARATE 150 MG: 150 TABLET, EXTENDED RELEASE ORAL at 21:09

## 2021-12-21 RX ADMIN — THIAMINE HCL TAB 100 MG 100 MG: 100 TAB at 08:53

## 2021-12-21 RX ADMIN — MIRTAZAPINE 15 MG: 15 TABLET, FILM COATED ORAL at 21:09

## 2021-12-21 RX ADMIN — LORAZEPAM 1 MG: 1 TABLET ORAL at 20:01

## 2021-12-21 RX ADMIN — MELATONIN 5 MG TABLET 5 MG: at 21:09

## 2021-12-21 RX ADMIN — ATENOLOL 25 MG: 25 TABLET ORAL at 08:53

## 2021-12-21 RX ADMIN — ACETAMINOPHEN 650 MG: 325 TABLET, FILM COATED ORAL at 19:49

## 2021-12-21 RX ADMIN — IPRATROPIUM BROMIDE AND ALBUTEROL SULFATE 3 ML: .5; 3 SOLUTION RESPIRATORY (INHALATION) at 09:02

## 2021-12-21 RX ADMIN — IPRATROPIUM BROMIDE AND ALBUTEROL SULFATE 3 ML: .5; 3 SOLUTION RESPIRATORY (INHALATION) at 19:23

## 2021-12-21 RX ADMIN — GUAIFENESIN AND DEXTROMETHORPHAN 10 ML: 100; 10 SYRUP ORAL at 21:11

## 2021-12-21 RX ADMIN — SENNOSIDES AND DOCUSATE SODIUM 2 TABLET: 50; 8.6 TABLET ORAL at 10:22

## 2021-12-21 RX ADMIN — LORAZEPAM 2 MG: 1 TABLET ORAL at 09:00

## 2021-12-21 RX ADMIN — IPRATROPIUM BROMIDE AND ALBUTEROL SULFATE 3 ML: .5; 3 SOLUTION RESPIRATORY (INHALATION) at 15:23

## 2021-12-21 RX ADMIN — PREDNISONE 40 MG: 20 TABLET ORAL at 17:08

## 2021-12-21 RX ADMIN — GUAIFENESIN AND DEXTROMETHORPHAN 10 ML: 100; 10 SYRUP ORAL at 17:08

## 2021-12-21 RX ADMIN — LORAZEPAM 1 MG: 1 TABLET ORAL at 12:36

## 2021-12-21 RX ADMIN — CITALOPRAM HYDROBROMIDE 20 MG: 20 TABLET ORAL at 08:53

## 2021-12-21 RX ADMIN — MULTIPLE VITAMINS W/ MINERALS TAB 1 TABLET: TAB at 08:53

## 2021-12-21 RX ADMIN — ACETAMINOPHEN 650 MG: 325 TABLET, FILM COATED ORAL at 09:49

## 2021-12-21 RX ADMIN — PRAVASTATIN SODIUM 20 MG: 20 TABLET ORAL at 08:53

## 2021-12-21 RX ADMIN — LOSARTAN POTASSIUM 25 MG: 25 TABLET, FILM COATED ORAL at 17:08

## 2021-12-21 RX ADMIN — IPRATROPIUM BROMIDE AND ALBUTEROL SULFATE 3 ML: .5; 3 SOLUTION RESPIRATORY (INHALATION) at 11:44

## 2021-12-21 ASSESSMENT — ACTIVITIES OF DAILY LIVING (ADL)
ADLS_ACUITY_SCORE: 9

## 2021-12-21 ASSESSMENT — MIFFLIN-ST. JEOR: SCORE: 1299.62

## 2021-12-21 NOTE — PROVIDER NOTIFICATION
MD Notification    Notified Person: MD    Notified Person Name: Jimmy     Notification Date/Time: 12/21/2021, 1628     Notification Interaction: web-based paging     Purpose of Notification: Hello, pt. having frequent nonproductive congested cough. pt. was wanting a throat lozenge and medication if possible. Thank you!     -MD updated about BP     Orders Received:  -X 1 dose of losartan ordered for BP   -PRN robitussin ordered for cough management    Comments:

## 2021-12-21 NOTE — PROGRESS NOTES
[facility]    Hospitalist Progress Note    Assessment & Plan     ASSESSMENT AND PLAN:  Linda Headley is a 76-year-old female with past medical history of hypertension, hyperlipidemia, chronic alcohol use, aortic stenosis, depression/anxiety, and chronic pain syndrome, among other medical problems, who presents to Emergency Room [facility] for evaluation of shortness of breath.  She was hypoxic and currently needing 2 liters nasal cannula.  She was admitted to the hospital [facility] for ongoing evaluation and care.     Acute hypoxic respiratory failure    Suspect 2/2 Viral URI with Reactive Airway Disease    Possible component of mild chf (mod-severe Aortic stenosis)    -She is maintained on daily oral Lasix, which she is compliant with.   - Her EF was intact on recent echocardiogram in 10/2021.  It was reported that RV systolic function was also normal.  She does have noted moderate aortic stenosis.  Interestingly enough, given her hypoxia and need for 2 liters nasal cannula with coarse-sounding breath sounds, her imaging is not really revealing.  There is no evidence of focal consolidation or infiltrate.  No overt evidence of pulmonary edema, and studies were negative for PE.   - Her proBNP is elevated tonight in comparison to priors, so this would maybe suggest some component of volume overload.    -given Lasix 40 mg IV in the Emergency Room on admission  -procal negative  -I/O -810 ml.   Wt 81.6kg---> 85/84kg.   -started on nebs qid, may be contributing to tremulousness  -covid and influenza pcr negative.     Dry to mildly productive cough for 1 week. New wheezing and sob  Significant other with similar sxs  Not feeling much better. Nebs help slightly  Decrease po intake     -started on steroids 12/18. Seen by pulmonary. Azithromycin started for tracheobronchitis    Suspect viral URI/tracheobronchitis with RAD    -slow improvement in wheezing, sob, cough oxygen needs since admission  -12/20  "weaned off oxygen  - 12/21. Feeling better, improved air movement overall but still with significant bilateral wheezing.   Remains off oxygen,.   Completed course IV Azithromycin    I/O -1.6 liters  Suspect euvolemic at this time.      Plan;    -  prednisone 40mg bid -cont current dose, given hallucination, will reduce to 40mg every day tomorrow.   -duoneb q4 hours for now  --I/O, daily wt  - IS, wean oxygen  - am labs  -pulmonary following.   -resumed lasix po qd         Hypokalemia:   Hypomagnesemia  Both resolved.   - Presumed secondary to decreased oral intake in the recent week in the setting of acute illness as well as the use of Lasix.  Potassium replacement has been ordered. Replaced K and Mg per protocol and normalized  - follow for now daily recheck am  -follow lytes daily    Hyponatremia: improving    -Thought to be possibly multifactorial related to alcohol use.   - She does not look overtly volume overloaded, but again some of her symptoms including orthopnea do sounds suggestive of volume overload.    -Na \125--> 129 then stable 129-130 range with diuresis.   --K now nl  -Urine Na 53 post lasix. Uosm 322. After lasix on admission  -has received lasix 40mg iv X 1 1700 day admission  -Na up with iv lasix. Not receive fluids.   - suspect 2/2 decreased solute intake, possible component of chf  -repeat Scott off lasix for 24 hours Scott <20 and U osmol 200 range.     -taking po/solute    Plan:  - follow bmp. Restarted po lasix        Alcohol use disorder-severe  Alcohol Withdrawal.   -She drinks 6 drinks per day  -.  Felt tremulous in the Emergency Room.  She was given some Ativan.   -CIWA protocol started. Score 4---> 3-->9. Tremor, anxiety, agitation  -tremors from this am better. Needed ativan. Nebs may worse tremor.   -nonfocal neuro exam. No tremor currently  - seen by chem Dep 12/20:   Per their note: \"Pt meets criteria for Alcohol use disorder-severe. Pt reports she attended [employer] IP CD treatment last " "spring (2021) and was sober until September 2021. Pt reports her use has increased up to one Liter of Corin every 4 days. Pt reports she has attended AA meetings in the past and has not been going recently. Pt reports she would not be interested in attending IP CD treatment at this time. Pt reports she would like to resume attending AA meetings regularly. Pt reports she would like to receive CD resources so she is able to follow up if the would like to attend CD treatment.\"  12/21: onset of auditory and visual hallucinations pm of 12/20.  Pt has insight into not being real. No agitation. Fully oriented. + anxiety and tremor as well. CIWA up to 8 range and getting ativan    Plan:   - monitor CIWA protocol with p.r.n.  Ativan.  Her home dose of gabapentin will be continued.   -B12 and folate level nl  - Thiamine, mv, folate  -UOB, mobilize      Depression, anxiety and chronic pain syndrome:    -Home medications will be continued, including her citalopram, gabapentin, Atarax at bedtime as needed, and Seroquel both and at bedtime as needed, as well as her Remeron and trazodone.       Hypertension:   -decent control, intermitently hypertensive  - Chronic and stable on antihypertensives.  These will be continued, including her atenolol and losartan. Follow   Am bmp     Hyperlipidemia:  Chronic and stable on statin.  This will be continued.     DEEP VENOUS THROMBOSIS PROPHYLAXIS:  PCDs. Pt ambulatory yesterday. Will add lovenox for proph     CODE STATUS:  Full code as was discussed with the patient and her daughter, Nery Brown, as her designated decision-maker on admission.     Dispo: seen by PT, home with assist. Outpatient PT  TBD, >2 days once etoh w/d has resolved and significant improvement in RAD    Nicho Marquez MD  Text Page  (7am to 6pm)  Interval History   Taking po well per RN. Up walking with PT  Yesterday began having visual and auditory hallucinations  Feels breathing better  No new issues.   No " n/v/d/abd pain      -Data reviewed today: I reviewed all new labs and imaging results over the last 24 hours. I personally reviewed imaging and labs since admission.     Physical Exam   Temp: 97.8  F (36.6  C) Temp src: Oral BP: 126/57 Pulse: 77   Resp: 18 SpO2: 95 % O2 Device: None (Room air)    Vitals:    12/18/21 0455 12/20/21 0616 12/21/21 0635   Weight: 84.8 kg (187 lb) 85.7 kg (189 lb) 83.5 kg (184 lb)     Vital Signs with Ranges  Temp:  [97.8  F (36.6  C)-98.7  F (37.1  C)] 97.8  F (36.6  C)  Pulse:  [61-96] 77  Resp:  [18-22] 18  BP: (126-169)/(57-92) 126/57  SpO2:  [91 %-100 %] 95 %  I/O last 3 completed shifts:  In: 320 [P.O.:320]  Out: 400 [Urine:400]    Constitutional: nontoxic, nad,   Respiratory: Bilateral expiratory wheezing, better than on admission as now moving air better, dry cough.   Cardiovascular: not tachy, RRR no r/g. 3/6 systolic murmur RUSB  GI: soft, nt, nd  Skin/Integumen: no rash or edema. No cyanosis  Psych; calm, pleasant, cooperative. No psychosis  Neuro: nl speech and  Mentation. Fully oriented. No tremor currently      Medications     - MEDICATION INSTRUCTIONS -       - MEDICATION INSTRUCTIONS -         aspirin  81 mg Oral Daily     atenolol  25 mg Oral Daily     budesonide  1 mg Nebulization BID     citalopram  20 mg Oral Daily     enoxaparin ANTICOAGULANT  40 mg Subcutaneous Q24H     folic acid  1 mg Oral Daily     furosemide  20 mg Oral Daily     gabapentin  300 mg Oral TID     ipratropium - albuterol 0.5 mg/2.5 mg/3 mL  3 mL Nebulization Q4H     losartan  25 mg Oral Daily     mirtazapine  15 mg Oral At Bedtime     multivitamin w/minerals  1 tablet Oral Daily     pravastatin  20 mg Oral Daily     predniSONE  40 mg Oral BID w/meals     QUEtiapine  150 mg Oral At Bedtime     sodium chloride (PF)  3 mL Intracatheter Q8H       Data   Recent Labs   Lab 12/21/21  0747 12/20/21  0744 12/19/21  0858 12/18/21  0758 12/18/21  0340 12/17/21 2019 12/17/21  1402   WBC 9.2  --  7.2  --  7.3   --   --    HGB 10.2*  --  10.2*  --  10.1*  --   --    MCV 91  --  92  --  89  --   --      --  255  --  229  --   --    * 129* 131*   < > 129*  --  125*   POTASSIUM 4.8 4.8 4.1   < > 4.4  4.4   < > 3.2*   CHLORIDE 93* 96 94   < > 94  --  88*   CO2 34* 29 33*   < > 31  --  30   BUN 27 23 21   < > 13  --  9   CR 1.03 0.86 1.07*   < > 0.81  --  0.84   ANIONGAP 3 4 4   < > 4  --  7   BIRGIT 9.5 9.8 9.3   < > 8.5  --  9.2   * 158* 136*   < > 111*  --  128*   ALBUMIN  --   --   --   --   --   --  3.7   PROTTOTAL  --   --   --   --   --   --  8.4   BILITOTAL  --   --   --   --   --   --  0.4   ALKPHOS  --   --   --   --   --   --  134   ALT  --   --   --   --   --   --  17   AST  --   --   --   --   --   --  19    < > = values in this interval not displayed.       Imaging:   No results found for this or any previous visit (from the past 24 hour(s)).

## 2021-12-21 NOTE — PROGRESS NOTES
"PULMONOLOGY PROGRESS NOTE    Date of Admission: 12/17/2021    CC/Reason for Hospital visit: hypoxemia, bronchospasm  SUBJECTIVE      Events reviewed since last seen. Stable night. No new respiratory problems. States breathing is about the same today. No longer needing O2.    ROS: A Problem Pertinent review of systems was negative except for items noted in HPI.  Past Medical, Family, and Social/Substance History has been reviewed: No interval changes.    OBJECTIVE   Vital signs:  Temp: 97.8  F (36.6  C) Temp src: Oral BP: 126/57 Pulse: 77   Resp: 18 SpO2: 95 % O2 Device: None (Room air) Oxygen Delivery: 1 LPM Height: 162.6 cm (5' 4\") Weight: 83.5 kg (184 lb)  Estimated body mass index is 31.58 kg/m  as calculated from the following:    Height as of this encounter: 1.626 m (5' 4\").    Weight as of this encounter: 83.5 kg (184 lb).        I/O last 3 completed shifts:  In: 320 [P.O.:320]  Out: 400 [Urine:400]      CONSTITUTIONAL/GENERAL APPEARANCE: Alert female. No Apparent Distress.  PSYCHIATRIC: Pleasant and appropriate mood and affect. Oriented x 3.  EARS, NOSE,THROAT,MOUTH: External ears and nose overall normal. Normal oral mucosa.   NECK: Neck appearance normal. No neck masses and the thyroid is not enlarged.   RESPIRATORY: Non-labored effort. Decreased BS, mild exp wheezes bilaterally, no change.  CARDIOVASCULAR: S1, S2, regular rate and rhythm.      LABORATORY ASSESSMENT    Arterial Blood Gas  Recent Labs   Lab 12/18/21  1432 12/17/21  1312   O2PER 35 3     CBC  Recent Labs   Lab 12/21/21  0747 12/19/21  0858 12/18/21  0340 12/17/21  1250   WBC 9.2 7.2 7.3 8.4   RBC 3.38* 3.42* 3.37* 3.67*   HGB 10.2* 10.2* 10.1* 11.0*   HCT 30.9* 31.4* 30.0* 33.0*   MCV 91 92 89 90   MCH 30.2 29.8 30.0 30.0   MCHC 33.0 32.5 33.7 33.3   RDW 14.6 14.7 14.1 13.8    255 229 265     BMP  Recent Labs   Lab 12/21/21  0747 12/20/21  0744 12/19/21  0858 12/18/21  2110 12/18/21  1432 12/18/21  0758   * 129* 131* 130*   < > " 129*   POTASSIUM 4.8 4.8 4.1  --   --  4.1   CHLORIDE 93* 96 94  --   --  94   BIRGIT 9.5 9.8 9.3  --   --  8.4*   CO2 34* 29 33*  --   --  31   BUN 27 23 21  --   --  12   CR 1.03 0.86 1.07*  --   --  0.90   * 158* 136*  --   --  110*    < > = values in this interval not displayed.     INRNo lab results found in last 7 days.   BNPNo lab results found in last 7 days.  VENOUS BLOOD GASES  Recent Labs   Lab 12/18/21  1432 12/17/21  1312   PHV 7.37 7.36   PCO2V 56* 58*   PO2V 56* 34   HCO3V 32* 32*   MOSHE 5.6* 5.3*       Additional labs and/or comments:    IMAGING      CT Chest 12/17 -  IMPRESSION:  1.  No evidence of pulmonary embolism.  2.  Dilated main pulmonary arteries suggest chronic pulmonary arterial hypertension.  3.  Ectatic ascending thoracic aorta at 4.1 cm.  4.  Cardiomegaly, but no pleural effusion or evidence of pulmonary edema.    CXR 12/17 -  IMPRESSION: Prominent heart size similar to prior. Pulmonary  vascularity within normal limits. No airspace consolidation,  pneumothorax, or pleural effusion.    PFT & OTHER TESTING       ASSESSMENT / PLAN      Pulmonary Diagnoses:  Bronchitis acute  Cough R05  Hypoxemia R09.02  Resp fail acute J96.00  SOB R06.02  Bronchospasm    Additional COVID-19 diagnoses:  Concern of possible exposure to COVID-19, Now RULED OUT Z03.818    ASSESSMENT: 76-year-old female non-smoker, history of moderate aortic stenosis, chronic alcohol use, chronic pain syndrome, admitted with cough, wheezing and shortness of breath status post recent upper respiratory infection.  Chest x-ray on admission was clear.  CT scan of the chest was negative for PE and demonstrated dilated main pulmonary arteries consistent with chronic pulmonary hypertension but clear lung fields bilaterally.  Acute hypoxemic respiratory failure likely secondary to viral upper respiratory infection complicated by bronchospasm (reactive airways disease/bronchial hyperreactivity).  Patient has been treated with  bronchodilators, antibiotics and steroids with slow clinical improvement.  Will add steroids to nebulizer regimen.  Respiratory status currently stable on room air.    PLAN:  1. Bronchodilators - continue DuoNebs, start Pulmicort nebs.  2. Steroids - Prednisone 40 mg BID.  3. Increase activity and IS.  4. Recommend outpatient Pulmonary follow-up at [facility] for PFTs [phone].      Nash Ventura M.D.    [facility]  [phone]   (pager)  [phone]   (office)

## 2021-12-21 NOTE — PLAN OF CARE
Summary:   Elevated BNP, hyponatremia, hypoxia/SOB  DATE & TIME: 12/20/2021 7172-0035  Cognitive Concerns/ Orientation : Alert and oriented x 4   BEHAVIOR & AGGRESSION TOOL COLOR: Green  CIWA SCORE: 4,8,4,3 for slight tremor, anxiety and mainly for headache. Ativan given x1  ABNL VS/O2: VSS on RA  MOBILITY: SBA with walker/gait belt  PAIN MANAGMENT: Tylenol given for headache x1  DIET: Low sat fat diet  BOWEL/BLADDER: continent B&B, did not c/o constipation overnight, stool softeners given previous shift  ABNL LAB/BG: Sodium 129, K+ 4.8 Mg 1.8 Phos 2.9   DRAIN/DEVICES: Peripheral IV SL left upper arm   TELEMETRY RHYTHM: SR w/ 1st degree heart block  SKIN: Scattered bruises forearms  TESTS/PROCEDURES: N/A  D/C DAY/GOALS/PLACE: Pending labs, O2 needs  OTHER IMPORTANT INFO: Hx ETOH. Audible wheezing/getting nebs/prednisone. Pulmonary consulted.

## 2021-12-21 NOTE — PLAN OF CARE
A & O x 4. VSS on RA. Tele SR with first degree AV block. CIWA 14, 11, 8, 8, PRN ativan given. PRN tylenol given for headache pain with relief. Up A1 with gait belt and walker. PIV SL. Pending discharge

## 2021-12-21 NOTE — DISCHARGE INSTRUCTIONS
CD resources if you would like to attend CD treatment.   Referrals/ Alternatives:     [facility]  [email or web address]      [facility] - accepts medicare for Outpatient CD treatment  Phone: [phone] (esha)  Fax: [phone]     Supportive Services   [facility]  [email or web address].[email or web address]        Alcoholics Anonymous  http://www.aa.org  Community Drug & Alcohol Support Resources   Alcoholics Anonymous   24/7 Phone Line: [phone]   [email or web address].org/   [email or web address].org/   For additional list of online meetings: http://aa-intergroup.org      [place] [facility]   [address] [address]   Phone: [phone]  [email or web address]      Home Care has been ordered for home nursing, physical and occupational therapy.  The agency, to be determined, will call you to arrange the first visit.  If you do not hear from the agency within 2 days, please call Essence Goddard at [phone]            Patient has cell phone & /clothing/earings/necklas which is in plastic bag in white belongings bag with clothing/cane/purse

## 2021-12-21 NOTE — PROGRESS NOTES
DATE & TIME: 1500-1930    Cognitive Concerns/ Orientation : AOx4 w/ forgetfulness   BEHAVIOR & AGGRESSION TOOL COLOR: Green  CIWA SCORE: 9, 8    ABNL VS/O2: VSS on RA  MOBILITY: SBA w/ walker  PAIN MANAGMENT: C/o headache/fullness - scored 9 on CIWA PRN Ativan x1 w/ relief   DIET: Regular  BOWEL/BLADDER: Continent- Voiding w/o diff, + flatus, + BS, - BM this yuan  ABNL LAB/BG: B, Hgb: 11.6, WBC: 17.1  DRAIN/DEVICES: PIV SL  TELEMETRY RHYTHM: SR  SKIN: Bruised otherwise intact  TESTS/PROCEDURES: NA  D/C DAY/GOALS/PLACE: Plan to discharge home when less wheezy, and Na stable  OTHER IMPORTANT INFO: NA

## 2021-12-22 ENCOUNTER — APPOINTMENT (OUTPATIENT)
Dept: PHYSICAL THERAPY | Facility: CLINIC | Age: 76
DRG: 202 | End: 2021-12-22
Payer: COMMERCIAL

## 2021-12-22 LAB
ANION GAP SERPL CALCULATED.3IONS-SCNC: 2 MMOL/L (ref 3–14)
BUN SERPL-MCNC: 29 MG/DL (ref 7–30)
CALCIUM SERPL-MCNC: 9.4 MG/DL (ref 8.5–10.1)
CHLORIDE BLD-SCNC: 93 MMOL/L (ref 94–109)
CO2 SERPL-SCNC: 36 MMOL/L (ref 20–32)
CREAT SERPL-MCNC: 1 MG/DL (ref 0.52–1.04)
GFR SERPL CREATININE-BSD FRML MDRD: 57 ML/MIN/1.73M2
GLUCOSE BLD-MCNC: 110 MG/DL (ref 70–99)
MAGNESIUM SERPL-MCNC: 2.1 MG/DL (ref 1.6–2.3)
PHOSPHATE SERPL-MCNC: 4.4 MG/DL (ref 2.5–4.5)
POTASSIUM BLD-SCNC: 4.8 MMOL/L (ref 3.4–5.3)
SODIUM SERPL-SCNC: 131 MMOL/L (ref 133–144)

## 2021-12-22 PROCEDURE — 999N000157 HC STATISTIC RCP TIME EA 10 MIN

## 2021-12-22 PROCEDURE — 250N000009 HC RX 250: Performed by: INTERNAL MEDICINE

## 2021-12-22 PROCEDURE — 84100 ASSAY OF PHOSPHORUS: CPT | Performed by: INTERNAL MEDICINE

## 2021-12-22 PROCEDURE — 94640 AIRWAY INHALATION TREATMENT: CPT

## 2021-12-22 PROCEDURE — 94640 AIRWAY INHALATION TREATMENT: CPT | Mod: 76

## 2021-12-22 PROCEDURE — 250N000013 HC RX MED GY IP 250 OP 250 PS 637: Performed by: INTERNAL MEDICINE

## 2021-12-22 PROCEDURE — 83735 ASSAY OF MAGNESIUM: CPT | Performed by: INTERNAL MEDICINE

## 2021-12-22 PROCEDURE — 36415 COLL VENOUS BLD VENIPUNCTURE: CPT | Performed by: INTERNAL MEDICINE

## 2021-12-22 PROCEDURE — 250N000012 HC RX MED GY IP 250 OP 636 PS 637: Performed by: INTERNAL MEDICINE

## 2021-12-22 PROCEDURE — 99233 SBSQ HOSP IP/OBS HIGH 50: CPT | Performed by: INTERNAL MEDICINE

## 2021-12-22 PROCEDURE — 97116 GAIT TRAINING THERAPY: CPT | Mod: GP

## 2021-12-22 PROCEDURE — 250N000011 HC RX IP 250 OP 636: Performed by: INTERNAL MEDICINE

## 2021-12-22 PROCEDURE — 120N000001 HC R&B MED SURG/OB

## 2021-12-22 PROCEDURE — 80048 BASIC METABOLIC PNL TOTAL CA: CPT | Performed by: INTERNAL MEDICINE

## 2021-12-22 PROCEDURE — 97530 THERAPEUTIC ACTIVITIES: CPT | Mod: GP

## 2021-12-22 RX ORDER — PREDNISONE 20 MG/1
40 TABLET ORAL DAILY
Status: DISCONTINUED | OUTPATIENT
Start: 2021-12-22 | End: 2021-12-25

## 2021-12-22 RX ORDER — QUETIAPINE FUMARATE 25 MG/1
25 TABLET, FILM COATED ORAL DAILY PRN
Status: DISCONTINUED | OUTPATIENT
Start: 2021-12-22 | End: 2021-12-27 | Stop reason: HOSPADM

## 2021-12-22 RX ORDER — QUETIAPINE FUMARATE 25 MG/1
50 TABLET, FILM COATED ORAL ONCE
Status: COMPLETED | OUTPATIENT
Start: 2021-12-22 | End: 2021-12-22

## 2021-12-22 RX ADMIN — IPRATROPIUM BROMIDE AND ALBUTEROL SULFATE 3 ML: .5; 3 SOLUTION RESPIRATORY (INHALATION) at 08:29

## 2021-12-22 RX ADMIN — IPRATROPIUM BROMIDE AND ALBUTEROL SULFATE 3 ML: .5; 3 SOLUTION RESPIRATORY (INHALATION) at 11:32

## 2021-12-22 RX ADMIN — PREDNISONE 40 MG: 20 TABLET ORAL at 12:13

## 2021-12-22 RX ADMIN — MULTIPLE VITAMINS W/ MINERALS TAB 1 TABLET: TAB at 08:01

## 2021-12-22 RX ADMIN — ENOXAPARIN SODIUM 40 MG: 40 INJECTION SUBCUTANEOUS at 12:14

## 2021-12-22 RX ADMIN — LOSARTAN POTASSIUM 50 MG: 50 TABLET, FILM COATED ORAL at 08:00

## 2021-12-22 RX ADMIN — GUAIFENESIN AND DEXTROMETHORPHAN 10 ML: 100; 10 SYRUP ORAL at 12:15

## 2021-12-22 RX ADMIN — GABAPENTIN 300 MG: 300 CAPSULE ORAL at 15:43

## 2021-12-22 RX ADMIN — IPRATROPIUM BROMIDE AND ALBUTEROL SULFATE 3 ML: .5; 3 SOLUTION RESPIRATORY (INHALATION) at 16:34

## 2021-12-22 RX ADMIN — GABAPENTIN 300 MG: 300 CAPSULE ORAL at 08:00

## 2021-12-22 RX ADMIN — MELATONIN 5 MG TABLET 5 MG: at 22:14

## 2021-12-22 RX ADMIN — BUDESONIDE 1 MG: 0.5 INHALANT RESPIRATORY (INHALATION) at 08:30

## 2021-12-22 RX ADMIN — ASPIRIN 81 MG: 81 TABLET, COATED ORAL at 08:00

## 2021-12-22 RX ADMIN — QUETIAPINE FUMARATE 150 MG: 150 TABLET, EXTENDED RELEASE ORAL at 22:14

## 2021-12-22 RX ADMIN — FUROSEMIDE 20 MG: 20 TABLET ORAL at 08:01

## 2021-12-22 RX ADMIN — FOLIC ACID 1 MG: 1 TABLET ORAL at 08:01

## 2021-12-22 RX ADMIN — QUETIAPINE FUMARATE 50 MG: 25 TABLET ORAL at 12:14

## 2021-12-22 RX ADMIN — GABAPENTIN 300 MG: 300 CAPSULE ORAL at 22:14

## 2021-12-22 RX ADMIN — ACETAMINOPHEN 650 MG: 325 TABLET, FILM COATED ORAL at 19:36

## 2021-12-22 RX ADMIN — MIRTAZAPINE 15 MG: 15 TABLET, FILM COATED ORAL at 22:14

## 2021-12-22 RX ADMIN — GUAIFENESIN AND DEXTROMETHORPHAN 10 ML: 100; 10 SYRUP ORAL at 22:14

## 2021-12-22 RX ADMIN — CITALOPRAM HYDROBROMIDE 20 MG: 20 TABLET ORAL at 08:00

## 2021-12-22 RX ADMIN — PRAVASTATIN SODIUM 20 MG: 20 TABLET ORAL at 08:00

## 2021-12-22 RX ADMIN — ATENOLOL 25 MG: 25 TABLET ORAL at 08:01

## 2021-12-22 ASSESSMENT — ACTIVITIES OF DAILY LIVING (ADL)
ADLS_ACUITY_SCORE: 9
ADLS_ACUITY_SCORE: 11
ADLS_ACUITY_SCORE: 9
ADLS_ACUITY_SCORE: 11
ADLS_ACUITY_SCORE: 9
ADLS_ACUITY_SCORE: 11
ADLS_ACUITY_SCORE: 11

## 2021-12-22 NOTE — PLAN OF CARE
Summary:  Elevated BNP, hyponatremia, hypoxia/SOB, ETOH   DATE & TIME: 12/22/21, 6231-2066  Cognitive Concerns/ Orientation : Alert and oriented x 4, calm and cooperative   BEHAVIOR & AGGRESSION TOOL COLOR: Green  CIWA SCORE: 2  ABNL VS/O2: VSS on RA ex BP elevated and tachy at times   MOBILITY: SBA with walker/gait belt, ambulated in hallway X2, up in chair X2  PAIN MANAGMENT: reporting mild HA, managed well with PRN tylenol and rest   DIET: Regular, tolerating-ate food from home  BOWEL/BLADDER: BS active x 4, continent B&B, Had 2 BM's today  ABNL LAB/BG: Sodium 130, Hgb 10.2   DRAIN/DEVICES: Peripheral IV SL left upper arm   TELEMETRY RHYTHM: SR w/ 1st AV block, asymptomatic   SKIN: Scattered bruises forearms, intact   TESTS/PROCEDURES: N/A  D/C DAY/GOALS/PLACE: >2 days once ETOH resolved and improvement in Reactive Airway Disease, recommended home with assist   OTHER IMPORTANT INFO: Hx ETOH. LS diminished in all fields, Audible wheezing at times- receiving nebs per RT, prednisone. Pulmonary consulted. Pt slept well throughout shift. Seroquel given for anxiety, not going through alcohol withdrawal.

## 2021-12-22 NOTE — PLAN OF CARE
Summary:  Elevated BNP, hyponatremia, hypoxia/SOB, ETOH   DATE & TIME: 12/21/2021, 7613-4004  Cognitive Concerns/ Orientation : Alert and oriented x 4, calm and cooperative   BEHAVIOR & AGGRESSION TOOL COLOR: Green  CIWA SCORE: 7, 9, 5 (HA, tremor, anxious), ativan given x 1   ABNL VS/O2: VSS on RA ex BP elevated and tachy at times (MAX 170s , x 1 dose losartan given with some effect), 1 L NC added at bedtime.   MOBILITY: SBA with walker/gait belt, up to chair this evening  PAIN MANAGMENT: reporting mild HA, managed well with PRN tylenol and rest   DIET: Regular, tolerating-ate food from home  BOWEL/BLADDER: BS active x 4, continent B&B, did not c/o constipation    ABNL LAB/BG: Sodium 130, Hgb 10.2   DRAIN/DEVICES: Peripheral IV SL left upper arm   TELEMETRY RHYTHM: SR w/ 1st AV block, asymptomatic   SKIN: Scattered bruises forearms, intact   TESTS/PROCEDURES: N/A  D/C DAY/GOALS/PLACE: >2 days once ETOH resolved and improvement in Reactive Airway Disease, recommended home with assist   OTHER IMPORTANT INFO: Hx ETOH. LS diminished in all fields, Audible expiratory/inspiratory wheezing at times- receiving nebs/prednisone. PRN robitussin ordered and given for frequent congested nonproductive cough. Pulmonary consulted. DaughterNery updated on plan of care

## 2021-12-22 NOTE — PROGRESS NOTES
[facility]    Hospitalist Progress Note    Assessment & Plan     ASSESSMENT AND PLAN:  Linda Headley is a 76-year-old female with past medical history of hypertension, hyperlipidemia, chronic alcohol use, aortic stenosis, depression/anxiety, and chronic pain syndrome, among other medical problems, who presents to Emergency Room [facility] for evaluation of shortness of breath.  She was hypoxic and currently needing 2 liters nasal cannula.  She was admitted to the hospital [facility] for ongoing evaluation and care.     Acute hypoxic respiratory failure    Suspect 2/2 Viral URI with Reactive Airway Disease    Possible component of mild chf (mod-severe Aortic stenosis)    -She is maintained on daily oral Lasix, which she is compliant with.   - Her EF was intact on recent echocardiogram in 10/2021.  It was reported that RV systolic function was also normal.  She does have noted moderate aortic stenosis.  Interestingly enough, given her hypoxia and need for 2 liters nasal cannula with coarse-sounding breath sounds, her imaging is not really revealing.  There is no evidence of focal consolidation or infiltrate.  No overt evidence of pulmonary edema, and studies were negative for PE.   - Her proBNP is elevated tonight in comparison to priors, so this would maybe suggest some component of volume overload.    -given Lasix 40 mg IV in the Emergency Room on admission  -procal negative  -I/O -810 ml.   Wt 81.6kg---> 85/84kg.   -started on nebs qid, may be contributing to tremulousness  -covid and influenza pcr negative.     Dry to mildly productive cough for 1 week. New wheezing and sob  Significant other with similar sxs  Not feeling much better. Nebs help slightly  Decrease po intake     -started on steroids 12/18. Seen by pulmonary. Azithromycin started for tracheobronchitis    Suspect viral URI/tracheobronchitis with RAD    -slow improvement in wheezing, sob, cough oxygen needs since admission  -12/20  weaned off oxygen  - 12/21. Feeling better, improved air movement overall but still with significant bilateral wheezing.   Remains off oxygen,.   Completed course IV Azithromycin    I/O -2.5 liters  Suspect euvolemic at this time.    Today.  Slow improvement. Better airmovement. Feels pulmonry sxs a little better. Nervous about dishcarge home.     Plan;    -  prednisone 40mg bid -cont current dose, given hallucination, will reduce to 40mg every day then taper every 3 days.   -duoneb q4 hours for now  --I/O, daily wt  - IS, wean oxygen  - am labs  -pulmonary following.   -resumed lasix po every day  -has pcp follow up on 12/30 arranged         Hypokalemia:   Hypomagnesemia  Both resolved.   - Presumed secondary to decreased oral intake in the recent week in the setting of acute illness as well as the use of Lasix.  Potassium replacement has been ordered. Replaced K and Mg per protocol and normalized  - follow for now daily recheck am  -follow lytes daily    Hyponatremia: improving    -Thought to be possibly multifactorial related to alcohol use.   - She does not look overtly volume overloaded, but again some of her symptoms including orthopnea do sounds suggestive of volume overload.    -Na \125--> 129 then stable 129-130 range with diuresis.   --K now nl  -Urine Na 53 post lasix. Uosm 322. After lasix on admission  -has received lasix 40mg iv X 1 1700 day admission  -Na up with iv lasix. Not receive fluids.   - suspect 2/2 decreased solute intake, possible component of chf  -repeat Scott off lasix for 24 hours Scott <20 and U osmol 200 range.     -taking po/solute  -stable Na at 131    Plan:  - follow bmp. Restarted po lasix        Alcohol use disorder-severe  Alcohol Withdrawal.   -She drinks 6 drinks per day  -.  Felt tremulous in the Emergency Room.  She was given some Ativan.   -CIWA protocol started. Score 4---> 3-->9. Tremor, anxiety, agitation  -tremors from this am better. Needed ativan. Nebs may worse tremor.  "  -nonfocal neuro exam. No tremor currently  - seen by chem Dep 12/20:   Per their note: \"Pt meets criteria for Alcohol use disorder-severe. Pt reports she attended [facility] IP CD treatment last spring (2021) and was sober until September 2021. Pt reports her use has increased up to one Liter of Corin every 4 days. Pt reports she has attended AA meetings in the past and has not been going recently. Pt reports she would not be interested in attending IP CD treatment at this time. Pt reports she would like to resume attending AA meetings regularly. Pt reports she would like to receive CD resources so she is able to follow up if the would like to attend CD treatment.\"  12/21: onset of auditory and visual hallucinations pm of 12/20.  Pt has insight into not being real. No agitation. Fully oriented. + anxiety and tremor as well. CIWA up to 8 range and getting ativan    Plan:   - monitor CIWA protocol with p.r.n.  Ativan.  Her home dose of gabapentin will be continued.   -B12 and folate level nl  - Thiamine, mv, folate  -UOB, mobilize      Depression, anxiety and chronic pain syndrome:    -Home medications will be continued, including her citalopram, gabapentin, Atarax at bedtime as needed, and Seroquel both and at bedtime as needed, as well as her Remeron and trazodone.  Will give dose seroquel for anxiety now and restart Prn daily seroquel for anxiety       Hypertension:   - Chronic and stable on antihypertensives.  These will be continued, including her atenolol and losartan.  -remains hypertensive. In part from etoh w/d, increase losartan to 50mg every day.   Follow   Am bmp    covid status. Neg pcr on admission,   -Pt is due for vaccine booster. Strongly encouraged booster asap once discharged. pcp follow up     Hyperlipidemia:  Chronic and stable on statin.  This will be continued.     DEEP VENOUS THROMBOSIS PROPHYLAXIS:  PCDs. Pt ambulatory yesterday. Will add lovenox for proph     CODE STATUS:  Full code as was " discussed with the patient and her daughter, Nery Brown, as her designated decision-maker on admission.     Dispo: seen by PT, home with assist. Outpatient PT  TBD,possibly in 1-2  days once etoh w/d has resolved and significant improvement in RAD    Discussed care plan with rn and pt, and pt's dtr.     Patient will need TCU. Pt and dtr agree. She does not have any support at home during the week from significant other.     Discussed with RACHAEL.    Nicho Marquez MD  Text Page  (7am to 6pm)  Interval History   Feels nervous about dischrage.   Feels breathing a little better again today  No new issues.   Still with slight visual hallucinations this am.   Slept well.     -Data reviewed today: I reviewed all new labs and imaging results over the last 24 hours. I personally reviewed imaging and labs since admission.     Physical Exam   Temp: 97.6  F (36.4  C) Temp src: Oral BP: (!) 153/90 Pulse: 87   Resp: 18 SpO2: 96 % O2 Device: Nasal cannula Oxygen Delivery: 1 LPM  Vitals:    12/18/21 0455 12/20/21 0616 12/21/21 0635   Weight: 84.8 kg (187 lb) 85.7 kg (189 lb) 83.5 kg (184 lb)     Vital Signs with Ranges  Temp:  [97.4  F (36.3  C)-98.6  F (37  C)] 97.6  F (36.4  C)  Pulse:  [] 87  Resp:  [16-18] 18  BP: (143-171)/() 153/90  SpO2:  [93 %-98 %] 96 %  I/O last 3 completed shifts:  In: 360 [P.O.:360]  Out: 1050 [Urine:1050]    Constitutional: nontoxic, nad, up in chair eating  Respiratory: Bilateral expiratory wheezing, better than on admission as now moving air better, dry cough. Continued slow improvement  Cardiovascular: not tachy, RRR no r/g. 3/6 systolic murmur RUSB  GI: soft, nt, nd  Skin/Integumen: no rash or edema. No cyanosis  Psych; calm, pleasant, cooperative. States seeing mild hallucinations this am. Better than yesterday  Neuro: nl speech and  Mentation. Fully oriented. No tremor currently        Medications     - MEDICATION INSTRUCTIONS -       - MEDICATION INSTRUCTIONS -         aspirin  81  mg Oral Daily     atenolol  25 mg Oral Daily     budesonide  1 mg Nebulization BID     citalopram  20 mg Oral Daily     enoxaparin ANTICOAGULANT  40 mg Subcutaneous Q24H     folic acid  1 mg Oral Daily     furosemide  20 mg Oral Daily     gabapentin  300 mg Oral TID     ipratropium - albuterol 0.5 mg/2.5 mg/3 mL  3 mL Nebulization Q4H     losartan  50 mg Oral Daily     mirtazapine  15 mg Oral At Bedtime     multivitamin w/minerals  1 tablet Oral Daily     pravastatin  20 mg Oral Daily     predniSONE  40 mg Oral Daily     QUEtiapine  150 mg Oral At Bedtime     sodium chloride (PF)  3 mL Intracatheter Q8H       Data   Recent Labs   Lab 12/22/21  0740 12/21/21  0747 12/20/21  0744 12/19/21  0858 12/18/21  0758 12/18/21  0340 12/17/21 2019 12/17/21  1402   WBC  --  9.2  --  7.2  --  7.3  --   --    HGB  --  10.2*  --  10.2*  --  10.1*  --   --    MCV  --  91  --  92  --  89  --   --    PLT  --  325  --  255  --  229  --   --    * 130* 129* 131*   < > 129*  --  125*   POTASSIUM 4.8 4.8 4.8 4.1   < > 4.4  4.4   < > 3.2*   CHLORIDE 93* 93* 96 94   < > 94  --  88*   CO2 36* 34* 29 33*   < > 31  --  30   BUN 29 27 23 21   < > 13  --  9   CR 1.00 1.03 0.86 1.07*   < > 0.81  --  0.84   ANIONGAP 2* 3 4 4   < > 4  --  7   BIRGIT 9.4 9.5 9.8 9.3   < > 8.5  --  9.2   * 107* 158* 136*   < > 111*  --  128*   ALBUMIN  --   --   --   --   --   --   --  3.7   PROTTOTAL  --   --   --   --   --   --   --  8.4   BILITOTAL  --   --   --   --   --   --   --  0.4   ALKPHOS  --   --   --   --   --   --   --  134   ALT  --   --   --   --   --   --   --  17   AST  --   --   --   --   --   --   --  19    < > = values in this interval not displayed.       Imaging:   No results found for this or any previous visit (from the past 24 hour(s)).

## 2021-12-22 NOTE — PROVIDER NOTIFICATION
MD Notification    Notified Person: MD    Notified Person Name: Jimmy     Notification Date/Time: 12/22/2021     Notification Interaction: web-based paging     Purpose of Notification: Hello, pt. daughter Nery called. I provided her with updates, but is requesting to speak with you directly about discharge plans when you have a chance. She is concerned about pt. going back home. Daughter cell- [phone]. Thank you!    Orders Received:    Comments:

## 2021-12-22 NOTE — PLAN OF CARE
Summary:  Elevated BNP, hyponatremia, hypoxia/SOB, ETOH   DATE & TIME: 12/21/21, 3264-3870  Cognitive Concerns/ Orientation : Alert and oriented x 4, calm and cooperative   BEHAVIOR & AGGRESSION TOOL COLOR: Green  CIWA SCORE: 3, (tremor, previously scoring for anxiety/headache), ativan given x 1 on previous shift  ABNL VS/O2: VSS on RA ex BP elevated and tachy at times   MOBILITY: SBA with walker/gait belt  PAIN MANAGMENT: reporting mild HA, managed well with PRN tylenol and rest   DIET: Regular, tolerating-ate food from home  BOWEL/BLADDER: BS active x 4, continent B&B, did not c/o constipation    ABNL LAB/BG: Sodium 130, Hgb 10.2   DRAIN/DEVICES: Peripheral IV SL left upper arm   TELEMETRY RHYTHM: SR w/ 1st AV block, asymptomatic   SKIN: Scattered bruises forearms, intact   TESTS/PROCEDURES: N/A  D/C DAY/GOALS/PLACE: >2 days once ETOH resolved and improvement in Reactive Airway Disease, recommended home with assist   OTHER IMPORTANT INFO: Hx ETOH. LS diminished in all fields, Audible wheezing at times- receiving nebs per RT, prednisone. Pulmonary consulted. Pt slept well throughout shift.

## 2021-12-22 NOTE — PROGRESS NOTES
"PULMONOLOGY PROGRESS NOTE    Date of Admission: 12/17/2021    CC/Reason for Hospital visit: hypoxemia, bronchospasm  SUBJECTIVE      Events of last night reviewed. Stable night. No new respiratory problems. States breathing is somewhat better today, still has some cough and wheezing.    ROS: A Problem Pertinent review of systems was negative except for items noted in HPI.  Past Medical, Family, and Social/Substance History has been reviewed: No interval changes.    OBJECTIVE   Vital signs:  Temp: 97.6  F (36.4  C) Temp src: Oral BP: (!) 153/90 Pulse: 87   Resp: 18 SpO2: 96 % O2 Device: Nasal cannula Oxygen Delivery: 1 LPM Height: 162.6 cm (5' 4\") Weight: 83.5 kg (184 lb)  Estimated body mass index is 31.58 kg/m  as calculated from the following:    Height as of this encounter: 1.626 m (5' 4\").    Weight as of this encounter: 83.5 kg (184 lb).        I/O last 3 completed shifts:  In: 360 [P.O.:360]  Out: 1050 [Urine:1050]      CONSTITUTIONAL/GENERAL APPEARANCE: Alert female. No Apparent Distress.  PSYCHIATRIC: Pleasant and appropriate mood and affect. Oriented x 3.  EARS, NOSE,THROAT,MOUTH: External ears and nose overall normal. Normal oral mucosa.   NECK: Neck appearance normal. No neck masses and the thyroid is not enlarged.   RESPIRATORY: Non-labored effort. Decreased BS, mild wheezing bilaterally.  CARDIOVASCULAR: S1, S2, regular rate and rhythm.      LABORATORY ASSESSMENT    Arterial Blood Gas  Recent Labs   Lab 12/18/21  1432 12/17/21  1312   O2PER 35 3     CBC  Recent Labs   Lab 12/21/21  0747 12/19/21  0858 12/18/21  0340 12/17/21  1250   WBC 9.2 7.2 7.3 8.4   RBC 3.38* 3.42* 3.37* 3.67*   HGB 10.2* 10.2* 10.1* 11.0*   HCT 30.9* 31.4* 30.0* 33.0*   MCV 91 92 89 90   MCH 30.2 29.8 30.0 30.0   MCHC 33.0 32.5 33.7 33.3   RDW 14.6 14.7 14.1 13.8    255 229 265     BMP  Recent Labs   Lab 12/22/21  0740 12/21/21  0747 12/20/21  0744 12/19/21  0858   * 130* 129* 131*   POTASSIUM 4.8 4.8 4.8 4.1 "   CHLORIDE 93* 93* 96 94   BIRGIT 9.4 9.5 9.8 9.3   CO2 36* 34* 29 33*   BUN 29 27 23 21   CR 1.00 1.03 0.86 1.07*   * 107* 158* 136*     INRNo lab results found in last 7 days.   BNPNo lab results found in last 7 days.  VENOUS BLOOD GASES  Recent Labs   Lab 12/18/21  1432 12/17/21  1312   PHV 7.37 7.36   PCO2V 56* 58*   PO2V 56* 34   HCO3V 32* 32*   MOSHE 5.6* 5.3*       Additional labs and/or comments:    IMAGING      CT Chest 12/17 -  IMPRESSION:  1.  No evidence of pulmonary embolism.  2.  Dilated main pulmonary arteries suggest chronic pulmonary arterial hypertension.  3.  Ectatic ascending thoracic aorta at 4.1 cm.  4.  Cardiomegaly, but no pleural effusion or evidence of pulmonary edema.    CXR 12/17 -  IMPRESSION: Prominent heart size similar to prior. Pulmonary  vascularity within normal limits. No airspace consolidation,  pneumothorax, or pleural effusion.    PFT & OTHER TESTING       ASSESSMENT / PLAN      Pulmonary Diagnoses:  Bronchitis acute  Cough R05  Hypoxemia R09.02  Resp fail acute J96.00  SOB R06.02  Bronchospasm    Additional COVID-19 diagnoses:  Concern of possible exposure to COVID-19, Now RULED OUT Z03.818    ASSESSMENT: 76-year-old female non-smoker, history of moderate aortic stenosis, chronic alcohol use, chronic pain syndrome, admitted with cough, wheezing and shortness of breath status post recent upper respiratory infection.  Chest x-ray on admission was clear.  CT scan of the chest was negative for PE and demonstrated dilated main pulmonary arteries consistent with chronic pulmonary hypertension but clear lung fields bilaterally.  Acute hypoxemic respiratory failure likely secondary to viral upper respiratory infection complicated by bronchospasm (reactive airways disease/bronchial hyperreactivity).  Patient has been treated with bronchodilators, antibiotics and steroids with slow clinical improvement. Would continue present rx for now. Respiratory status currently stable on room  air.    PLAN:  1. Bronchodilators - DuoNeb + Pulmicort nebs.  2. Steroids - Prednisone taper.  3. Increase activity and IS.  4. Recommend outpatient Pulmonary follow-up at [facility] for PFTs [phone].      Nash Ventura M.D.    [facility]  [phone]   (pager)  [phone]   (office)

## 2021-12-23 ENCOUNTER — APPOINTMENT (OUTPATIENT)
Dept: OCCUPATIONAL THERAPY | Facility: CLINIC | Age: 76
DRG: 202 | End: 2021-12-23
Payer: COMMERCIAL

## 2021-12-23 ENCOUNTER — APPOINTMENT (OUTPATIENT)
Dept: PHYSICAL THERAPY | Facility: CLINIC | Age: 76
DRG: 202 | End: 2021-12-23
Payer: COMMERCIAL

## 2021-12-23 LAB
ANION GAP SERPL CALCULATED.3IONS-SCNC: 3 MMOL/L (ref 3–14)
BUN SERPL-MCNC: 30 MG/DL (ref 7–30)
CALCIUM SERPL-MCNC: 9.1 MG/DL (ref 8.5–10.1)
CHLORIDE BLD-SCNC: 95 MMOL/L (ref 94–109)
CO2 SERPL-SCNC: 33 MMOL/L (ref 20–32)
CREAT SERPL-MCNC: 1.11 MG/DL (ref 0.52–1.04)
GFR SERPL CREATININE-BSD FRML MDRD: 51 ML/MIN/1.73M2
GLUCOSE BLD-MCNC: 125 MG/DL (ref 70–99)
MAGNESIUM SERPL-MCNC: 2 MG/DL (ref 1.6–2.3)
PHOSPHATE SERPL-MCNC: 3.5 MG/DL (ref 2.5–4.5)
PLATELET # BLD AUTO: 338 10E3/UL (ref 150–450)
POTASSIUM BLD-SCNC: 4 MMOL/L (ref 3.4–5.3)
SODIUM SERPL-SCNC: 131 MMOL/L (ref 133–144)

## 2021-12-23 PROCEDURE — 250N000013 HC RX MED GY IP 250 OP 250 PS 637: Performed by: INTERNAL MEDICINE

## 2021-12-23 PROCEDURE — 250N000009 HC RX 250: Performed by: INTERNAL MEDICINE

## 2021-12-23 PROCEDURE — 84100 ASSAY OF PHOSPHORUS: CPT | Performed by: INTERNAL MEDICINE

## 2021-12-23 PROCEDURE — 999N000157 HC STATISTIC RCP TIME EA 10 MIN

## 2021-12-23 PROCEDURE — 82310 ASSAY OF CALCIUM: CPT | Performed by: INTERNAL MEDICINE

## 2021-12-23 PROCEDURE — 85049 AUTOMATED PLATELET COUNT: CPT | Performed by: INTERNAL MEDICINE

## 2021-12-23 PROCEDURE — 36415 COLL VENOUS BLD VENIPUNCTURE: CPT | Performed by: INTERNAL MEDICINE

## 2021-12-23 PROCEDURE — 97110 THERAPEUTIC EXERCISES: CPT | Mod: GP

## 2021-12-23 PROCEDURE — 250N000011 HC RX IP 250 OP 636: Performed by: INTERNAL MEDICINE

## 2021-12-23 PROCEDURE — 99233 SBSQ HOSP IP/OBS HIGH 50: CPT | Performed by: INTERNAL MEDICINE

## 2021-12-23 PROCEDURE — 97535 SELF CARE MNGMENT TRAINING: CPT | Mod: GO

## 2021-12-23 PROCEDURE — 83735 ASSAY OF MAGNESIUM: CPT | Performed by: INTERNAL MEDICINE

## 2021-12-23 PROCEDURE — 250N000012 HC RX MED GY IP 250 OP 636 PS 637: Performed by: INTERNAL MEDICINE

## 2021-12-23 PROCEDURE — 94640 AIRWAY INHALATION TREATMENT: CPT

## 2021-12-23 PROCEDURE — 120N000001 HC R&B MED SURG/OB

## 2021-12-23 PROCEDURE — 94640 AIRWAY INHALATION TREATMENT: CPT | Mod: 76

## 2021-12-23 RX ORDER — IPRATROPIUM BROMIDE AND ALBUTEROL SULFATE 2.5; .5 MG/3ML; MG/3ML
3 SOLUTION RESPIRATORY (INHALATION) 4 TIMES DAILY
Qty: 90 ML | Status: ON HOLD | DISCHARGE
Start: 2021-12-23 | End: 2021-12-31

## 2021-12-23 RX ORDER — LANOLIN ALCOHOL/MO/W.PET/CERES
100 CREAM (GRAM) TOPICAL DAILY
Qty: 7 TABLET | Refills: 0 | Status: ON HOLD | DISCHARGE
Start: 2021-12-23 | End: 2021-12-31

## 2021-12-23 RX ORDER — BUDESONIDE 0.5 MG/2ML
1 INHALANT ORAL 2 TIMES DAILY
DISCHARGE
Start: 2021-12-23 | End: 2021-12-27

## 2021-12-23 RX ORDER — PREDNISONE 10 MG/1
TABLET ORAL
DISCHARGE
Start: 2021-12-23 | End: 2021-12-27

## 2021-12-23 RX ORDER — IPRATROPIUM BROMIDE AND ALBUTEROL SULFATE 2.5; .5 MG/3ML; MG/3ML
1 SOLUTION RESPIRATORY (INHALATION) EVERY 4 HOURS PRN
Qty: 90 ML | DISCHARGE
Start: 2021-12-23 | End: 2021-12-27

## 2021-12-23 RX ORDER — ALBUTEROL SULFATE 90 UG/1
2 AEROSOL, METERED RESPIRATORY (INHALATION) EVERY 6 HOURS PRN
Qty: 18 G | DISCHARGE
Start: 2021-12-23 | End: 2021-12-27

## 2021-12-23 RX ORDER — GUAIFENESIN 600 MG/1
1200 TABLET, EXTENDED RELEASE ORAL 2 TIMES DAILY
Status: DISCONTINUED | OUTPATIENT
Start: 2021-12-23 | End: 2021-12-27 | Stop reason: HOSPADM

## 2021-12-23 RX ADMIN — GABAPENTIN 300 MG: 300 CAPSULE ORAL at 08:02

## 2021-12-23 RX ADMIN — BUDESONIDE 0.5 MG: 0.5 INHALANT RESPIRATORY (INHALATION) at 07:49

## 2021-12-23 RX ADMIN — ASPIRIN 81 MG: 81 TABLET, COATED ORAL at 08:01

## 2021-12-23 RX ADMIN — QUETIAPINE FUMARATE 150 MG: 150 TABLET, EXTENDED RELEASE ORAL at 22:39

## 2021-12-23 RX ADMIN — ENOXAPARIN SODIUM 40 MG: 40 INJECTION SUBCUTANEOUS at 11:44

## 2021-12-23 RX ADMIN — GUAIFENESIN 1200 MG: 600 TABLET, EXTENDED RELEASE ORAL at 21:43

## 2021-12-23 RX ADMIN — IPRATROPIUM BROMIDE AND ALBUTEROL SULFATE 3 ML: .5; 3 SOLUTION RESPIRATORY (INHALATION) at 07:48

## 2021-12-23 RX ADMIN — ATENOLOL 25 MG: 25 TABLET ORAL at 08:02

## 2021-12-23 RX ADMIN — LOSARTAN POTASSIUM 50 MG: 50 TABLET, FILM COATED ORAL at 08:01

## 2021-12-23 RX ADMIN — GUAIFENESIN AND DEXTROMETHORPHAN 10 ML: 100; 10 SYRUP ORAL at 19:38

## 2021-12-23 RX ADMIN — GUAIFENESIN 1200 MG: 600 TABLET, EXTENDED RELEASE ORAL at 11:44

## 2021-12-23 RX ADMIN — ACETAMINOPHEN 650 MG: 325 TABLET, FILM COATED ORAL at 21:43

## 2021-12-23 RX ADMIN — BUDESONIDE 1 MG: 0.5 INHALANT RESPIRATORY (INHALATION) at 20:13

## 2021-12-23 RX ADMIN — MELATONIN 5 MG TABLET 5 MG: at 22:40

## 2021-12-23 RX ADMIN — GUAIFENESIN AND DEXTROMETHORPHAN 10 ML: 100; 10 SYRUP ORAL at 06:18

## 2021-12-23 RX ADMIN — GABAPENTIN 300 MG: 300 CAPSULE ORAL at 16:04

## 2021-12-23 RX ADMIN — MULTIPLE VITAMINS W/ MINERALS TAB 1 TABLET: TAB at 08:01

## 2021-12-23 RX ADMIN — IPRATROPIUM BROMIDE AND ALBUTEROL SULFATE 3 ML: .5; 3 SOLUTION RESPIRATORY (INHALATION) at 11:49

## 2021-12-23 RX ADMIN — FUROSEMIDE 20 MG: 20 TABLET ORAL at 08:02

## 2021-12-23 RX ADMIN — GABAPENTIN 300 MG: 300 CAPSULE ORAL at 22:39

## 2021-12-23 RX ADMIN — IPRATROPIUM BROMIDE AND ALBUTEROL SULFATE 3 ML: .5; 3 SOLUTION RESPIRATORY (INHALATION) at 20:13

## 2021-12-23 RX ADMIN — PREDNISONE 40 MG: 20 TABLET ORAL at 08:02

## 2021-12-23 RX ADMIN — PRAVASTATIN SODIUM 20 MG: 20 TABLET ORAL at 08:01

## 2021-12-23 RX ADMIN — CITALOPRAM HYDROBROMIDE 20 MG: 20 TABLET ORAL at 08:02

## 2021-12-23 RX ADMIN — FOLIC ACID 1 MG: 1 TABLET ORAL at 08:02

## 2021-12-23 RX ADMIN — MIRTAZAPINE 15 MG: 15 TABLET, FILM COATED ORAL at 22:39

## 2021-12-23 RX ADMIN — IPRATROPIUM BROMIDE AND ALBUTEROL SULFATE 3 ML: .5; 3 SOLUTION RESPIRATORY (INHALATION) at 14:10

## 2021-12-23 ASSESSMENT — ACTIVITIES OF DAILY LIVING (ADL)
ADLS_ACUITY_SCORE: 9
ADLS_ACUITY_SCORE: 10
ADLS_ACUITY_SCORE: 9
ADLS_ACUITY_SCORE: 10
ADLS_ACUITY_SCORE: 9
ADLS_ACUITY_SCORE: 10
ADLS_ACUITY_SCORE: 10
ADLS_ACUITY_SCORE: 9
ADLS_ACUITY_SCORE: 10
ADLS_ACUITY_SCORE: 9
ADLS_ACUITY_SCORE: 10
ADLS_ACUITY_SCORE: 9
ADLS_ACUITY_SCORE: 10
ADLS_ACUITY_SCORE: 10
ADLS_ACUITY_SCORE: 9

## 2021-12-23 ASSESSMENT — MIFFLIN-ST. JEOR: SCORE: 1316.86

## 2021-12-23 NOTE — PLAN OF CARE
Summary:  Elevated BNP, hyponatremia, hypoxia/SOB, ETOH   DATE & TIME: 12/22/21 1318-0245  Cognitive Concerns/ Orientation: A/O x 4, calm and cooperative   BEHAVIOR & AGGRESSION TOOL COLOR: Green  CIWA SCORE: 1  ABNL VS/O2: VSS on RA- O2 mid 90s. 1L O2 applied while sleeping.   MOBILITY: SBA with walker/gait belt.  PAIN MANAGMENT: reporting mild HA, managed well with PRN tylenol and rest   DIET: Regular  BOWEL/BLADDER: BS active x 4, continent B&B, Had 2 BM's today  ABNL LAB/BG: Sodium 131   DRAIN/DEVICES: Peripheral IV SL left upper arm   TELEMETRY RHYTHM: SR w/ 1st AV block, asymptomatic on this shift  SKIN: Scattered bruises forearms, intact   TESTS/PROCEDURES: N/A  D/C DAY/GOALS/PLACE: >2 days once ETOH resolved and improvement in Reactive Airway Disease, recommended home with assist   OTHER IMPORTANT INFO: Hx ETOH. LS diminished in all fields, Audible wheezing at times/improving- receiving nebs per RT, prednisone. Cough improving/less frequent, PRN Robitussin given. Pulmonary consulted.

## 2021-12-23 NOTE — PLAN OF CARE
Summary:  Elevated BNP, hyponatremia, hypoxia/SOB, ETOH   DATE & TIME: 12/23/21 0580-3979  Cognitive Concerns/ Orientation: A/O x 4, calm and cooperative   BEHAVIOR & AGGRESSION TOOL COLOR: Green  CIWA SCORE: 1  ABNL VS/O2: VSS on RA- O2 mid 90s. 1L O2 applied while sleeping.   MOBILITY: SBA with walker/gait belt, ambulated in hallways  PAIN MANAGMENT: tylenol on board  DIET: Regular  BOWEL/BLADDER: BS active x 4, continent B&B  ABNL LAB/BG: Sodium 131   DRAIN/DEVICES: Peripheral IV SL left upper arm   TELEMETRY RHYTHM: SR w/ 1st AV block, asymptomatic on this shift  SKIN: Scattered bruises forearms, intact   TESTS/PROCEDURES: N/A  D/C DAY/GOALS/PLACE: 1-2 days once  improvement in Reactive Airway Disease, recommending TCU.

## 2021-12-23 NOTE — PROGRESS NOTES
Care Management Follow Up    Length of Stay (days): 6    Expected Discharge Date: 12/23/2021     Concerns to be Addressed: discharge planning     Patient plan of care discussed at interdisciplinary rounds: Yes    Anticipated Discharge Disposition: Transitional Care     Anticipated Discharge Services: None  Anticipated Discharge DME:  (Possibly a nebulizer)    Patient/family educated on Medicare website which has current facility and service quality ratings: yes  Education Provided on the Discharge Plan:  yes  Patient/Family in Agreement with the Plan: yes    Referrals Placed by CM/SW: Post Acute Facilities  Private pay costs discussed: Not applicable    Additional Information:  Per Dr Marquez, pt and daughter report pt will not have help at home and desire at TCU. PT is also now recommending at TCU. RACHAEL met with pt and provided a TCU list. Discussed TCU and possible locations. She would like referrals to [facility], [facility], [facility] and [facility]. Sent.       LIZZETTE Spence, [facility]  [phone]  [facility]    Addendum: Several conversations with pt and her daughter Nery. No accepting TCU yet. Facilities full. [facility] declined due to pt's ETOH use. Additional referrals sent. Pt had a CD assessment done 12/20. Pt is now expressing interest in an in-pt program. She has attended [facility] in the past. Discussed the limitations with Medicare and in-pt treatment. Provided the list of program recommendations to Nery. They would like a referral to [facility], along with TCU's.

## 2021-12-23 NOTE — PROGRESS NOTES
[facility]    Hospitalist Progress Note    Assessment & Plan     ASSESSMENT AND PLAN:  Linda Headley is a 76-year-old female with past medical history of hypertension, hyperlipidemia, chronic alcohol use, aortic stenosis, depression/anxiety, and chronic pain syndrome, among other medical problems, [facility].  She was hypoxic and currently needing 2 liters nasal cannula.  She was admitted to the hospital [facility] for ongoing evaluation and care.     Acute hypoxic respiratory failure    Suspect 2/2 Viral URI with Reactive Airway Disease    Possible component of mild chf (mod-severe Aortic stenosis), acute diastolic/HFpEF- mild    -She is maintained on daily oral Lasix, which she is compliant with.   - Her EF was intact on recent echocardiogram in 10/2021.  It was reported that RV systolic function was also normal.  She does have noted moderate aortic stenosis.  Interestingly enough, given her hypoxia and need for 2 liters nasal cannula with coarse-sounding breath sounds, her imaging is not really revealing.  There is no evidence of focal consolidation or infiltrate.  No overt evidence of pulmonary edema, and studies were negative for PE.   - Her proBNP is elevated tonight in comparison to priors, so this would maybe suggest some component of volume overload.    -given Lasix 40 mg IV in the Emergency Room on admission  -procal negative  -I/O -810 ml.   Wt 81.6kg---> 85/84kg.   -started on nebs qid, may be contributing to tremulousness  -covid and influenza pcr negative.     Dry to mildly productive cough for 1 week. New wheezing and sob  Significant other with similar sxs  Not feeling much better. Nebs help slightly  Decrease po intake     -started on steroids 12/18. Seen by pulmonary. Azithromycin started for tracheobronchitis    Suspect viral URI/tracheobronchitis with RAD    -slow improvement in wheezing, sob, cough oxygen needs  since admission  -12/20 weaned off oxygen  - 12/21. Feeling better, improved air movement overall but still with significant bilateral wheezing.   Remains off oxygen,.   Completed course IV Azithromycin    I/O -1.8 liters  Suspect euvolemic at this time.      Today.  Slow improvement. Better airmovement    Plan;    -  prednisone taper   -duoneb q4 hours for now  --I/O, daily wt  - IS, wean oxygen  - am labs  -pulmonary following.   -resumed lasix po every day  -has pcp follow up on 12/30 arranged  -discharge to TCU         Hypokalemia:   Hypomagnesemia  Both resolved.   - Presumed secondary to decreased oral intake in the recent week in the setting of acute illness as well as the use of Lasix.  Potassium replacement has been ordered. Replaced K and Mg per protocol and normalized  - follow for now daily recheck am  -follow lytes daily  -follow lytes at tcu    Hyponatremia: improving    -Thought to be possibly multifactorial related to alcohol use.   - She does not look overtly volume overloaded, but again some of her symptoms including orthopnea do sounds suggestive of volume overload.    -Na \125--> 129 then stable 129-130 range with diuresis.   --K now nl  -Urine Na 53 post lasix. Uosm 322. After lasix on admission  -has received lasix 40mg iv X 1 1700 day admission  -Na up with iv lasix. Not receive fluids.   - suspect 2/2 decreased solute intake, possible component of chf  -repeat Scott off lasix for 24 hours Scott <20 and U osmol 200 range.     -taking po/solute  -stable Na at 131    Plan:  - follow bmp. Restarted po lasix  Follow bmp at tcu        Alcohol use disorder-severe  Alcohol Withdrawal.   -She drinks 6 drinks per day  -.  Felt tremulous in the Emergency Room.  She was given some Ativan.   -CIWA protocol started. Score 4---> 3-->9. Tremor, anxiety, agitation  -tremors from this am better. Needed ativan. Nebs may worse tremor.   -nonfocal neuro exam. No tremor currently  - seen by chem Dep 12/20:   Per their  "note: \"Pt meets criteria for Alcohol use disorder-severe. Pt reports she attended [employer] IP CD treatment last spring (2021) and was sober until September 2021. Pt reports her use has increased up to one Liter of Corin every 4 days. Pt reports she has attended AA meetings in the past and has not been going recently. Pt reports she would not be interested in attending IP CD treatment at this time. Pt reports she would like to resume attending AA meetings regularly. Pt reports she would like to receive CD resources so she is able to follow up if the would like to attend CD treatment.\"  12/21: onset of auditory and visual hallucinations pm of 12/20.  Pt has insight into not being real. No agitation. Fully oriented. + anxiety and tremor as well. CIWA up to 8 range and getting ativan  12/23, etoh resolved.     Plan:   -cont Her home dose of gabapentin will be continued.   -B12 and folate level nl  - Thiamine, mv, folate  -UOB, mobilize  -discontinue etoh w/d protocol      Depression, anxiety and chronic pain syndrome:    -Home medications will be continued, including her citalopram, gabapentin, Atarax at bedtime as needed, and Seroquel both and at bedtime as needed, as well as her Remeron and trazodone.  Will give dose seroquel for anxiety now and restart Prn daily seroquel for anxiety       Hypertension:   - Chronic and stable on antihypertensives.  These will be continued, including her atenolol and losartan.  -remains hypertensive. In part from etoh w/d, increase losartan to 50mg every day.   Follow   Bmp at u    covid status. Neg pcr on admission,   -Pt is due for vaccine booster. Strongly encouraged booster asap once discharged. pcp follow up     Hyperlipidemia:  Chronic and stable on statin.  This will be continued.     DEEP VENOUS THROMBOSIS PROPHYLAXIS:  PCDs. Pt ambulatory yesterday. Will add lovenox for proph     CODE STATUS:  Full code as was discussed with the patient and her daughter, Nery Brown, as her " designated decision-maker on admission.     Dispo: pt ready for discharge to tcu. Updated SW. Awaiting placement. Discharge orders started. Pt does not have assistance at home so will need TCU.   Discussed with sw, pt's dtr and pt    Nicho Marquez MD  Text Page  (7am to 6pm)  Interval History   Breathing about the same  No further hallucinations  Wants to be sober  No new issues.   Taking po.   Off oxygen. Possible desats to ~88%RA at night.     -Data reviewed today: I reviewed all new labs and imaging results over the last 24 hours. I personally reviewed imaging and labs since admission.     Physical Exam   Temp: 98.3  F (36.8  C) Temp src: Oral BP: 119/60 Pulse: 64   Resp: 18 SpO2: 97 % O2 Device: None (Room air) Oxygen Delivery: 1 LPM  Vitals:    12/20/21 0616 12/21/21 0635 12/23/21 0435   Weight: 85.7 kg (189 lb) 83.5 kg (184 lb) 85.2 kg (187 lb 12.8 oz)     Vital Signs with Ranges  Temp:  [98  F (36.7  C)-98.3  F (36.8  C)] 98.3  F (36.8  C)  Pulse:  [62-64] 64  Resp:  [16-18] 18  BP: (119-128)/(60-61) 119/60  SpO2:  [93 %-98 %] 97 %  I/O last 3 completed shifts:  In: 520 [P.O.:520]  Out: -     Constitutional: nontoxic, nad, up in chair   Respiratory: Bilateral expiratory wheezing, better than on admission as now moving air better, dry cough. Continued slow improvement over last 3 days.   Cardiovascular: not tachy, RRR no r/g. 3/6 systolic murmur RUSB  GI: soft, nt, nd  Skin/Integumen: no rash or edema. No cyanosis  Psych; calm, pleasant, cooperative. Denies hallucinations   Neuro: nl speech and  Mentation. Fully oriented. No tremor , fully oriented        Medications     - MEDICATION INSTRUCTIONS -       - MEDICATION INSTRUCTIONS -         aspirin  81 mg Oral Daily     atenolol  25 mg Oral Daily     budesonide  1 mg Nebulization BID     citalopram  20 mg Oral Daily     enoxaparin ANTICOAGULANT  40 mg Subcutaneous Q24H     folic acid  1 mg Oral Daily     furosemide  20 mg Oral Daily     gabapentin  300 mg  Oral TID     guaiFENesin  1,200 mg Oral BID     ipratropium - albuterol 0.5 mg/2.5 mg/3 mL  3 mL Nebulization Q4H     losartan  50 mg Oral Daily     mirtazapine  15 mg Oral At Bedtime     multivitamin w/minerals  1 tablet Oral Daily     pravastatin  20 mg Oral Daily     predniSONE  40 mg Oral Daily     QUEtiapine  150 mg Oral At Bedtime     sodium chloride (PF)  3 mL Intracatheter Q8H       Data   Recent Labs   Lab 12/23/21  0741 12/22/21  0740 12/21/21  0747 12/20/21  0744 12/19/21  0858 12/18/21  0758 12/18/21  0340 12/17/21 2019 12/17/21  1402   WBC  --   --  9.2  --  7.2  --  7.3  --   --    HGB  --   --  10.2*  --  10.2*  --  10.1*  --   --    MCV  --   --  91  --  92  --  89  --   --      --  325  --  255  --  229  --   --    * 131* 130*   < > 131*   < > 129*  --  125*   POTASSIUM 4.0 4.8 4.8   < > 4.1   < > 4.4  4.4   < > 3.2*   CHLORIDE 95 93* 93*   < > 94   < > 94  --  88*   CO2 33* 36* 34*   < > 33*   < > 31  --  30   BUN 30 29 27   < > 21   < > 13  --  9   CR 1.11* 1.00 1.03   < > 1.07*   < > 0.81  --  0.84   ANIONGAP 3 2* 3   < > 4   < > 4  --  7   BIRGIT 9.1 9.4 9.5   < > 9.3   < > 8.5  --  9.2   * 110* 107*   < > 136*   < > 111*  --  128*   ALBUMIN  --   --   --   --   --   --   --   --  3.7   PROTTOTAL  --   --   --   --   --   --   --   --  8.4   BILITOTAL  --   --   --   --   --   --   --   --  0.4   ALKPHOS  --   --   --   --   --   --   --   --  134   ALT  --   --   --   --   --   --   --   --  17   AST  --   --   --   --   --   --   --   --  19    < > = values in this interval not displayed.       Imaging:   No results found for this or any previous visit (from the past 24 hour(s)).

## 2021-12-23 NOTE — PROGRESS NOTES
"PULMONOLOGY PROGRESS NOTE    Date of Admission: 12/17/2021    CC/Reason for Hospital visit: hypoxemia, bronchospasm  SUBJECTIVE      Events reviewed since last seen. Stable night. Patient states that she is \"horrible\" today but wants to know when she can go home. Reports cough productive of some brownish sputum. Back on O2 at 1 lpm.    ROS: A Problem Pertinent review of systems was negative except for items noted in HPI.  Past Medical, Family, and Social/Substance History has been reviewed: No interval changes.    OBJECTIVE   Vital signs:  Temp: 98  F (36.7  C) Temp src: Oral BP: 128/61 Pulse: 62   Resp: 16 SpO2: 93 % O2 Device: None (Room air) Oxygen Delivery: 1 LPM Height: 162.6 cm (5' 4\") Weight: 85.2 kg (187 lb 12.8 oz)  Estimated body mass index is 32.24 kg/m  as calculated from the following:    Height as of this encounter: 1.626 m (5' 4\").    Weight as of this encounter: 85.2 kg (187 lb 12.8 oz).        I/O last 3 completed shifts:  In: 120 [P.O.:120]  Out: -       CONSTITUTIONAL/GENERAL APPEARANCE: Alert female. No Apparent Distress.  PSYCHIATRIC: Pleasant and appropriate mood and affect. Oriented x 3.  EARS, NOSE,THROAT,MOUTH: External ears and nose overall normal. Normal oral mucosa.   NECK: Neck appearance normal. No neck masses and the thyroid is not enlarged.   RESPIRATORY: Non-labored effort. Decreased BS, few rhonchi that clear with cough, few exp wheezes.  CARDIOVASCULAR: S1, S2, regular rate and rhythm.      LABORATORY ASSESSMENT    Arterial Blood Gas  Recent Labs   Lab 12/18/21  1432 12/17/21  1312   O2PER 35 3     CBC  Recent Labs   Lab 12/21/21  0747 12/19/21  0858 12/18/21  0340 12/17/21  1250   WBC 9.2 7.2 7.3 8.4   RBC 3.38* 3.42* 3.37* 3.67*   HGB 10.2* 10.2* 10.1* 11.0*   HCT 30.9* 31.4* 30.0* 33.0*   MCV 91 92 89 90   MCH 30.2 29.8 30.0 30.0   MCHC 33.0 32.5 33.7 33.3   RDW 14.6 14.7 14.1 13.8    255 229 265     BMP  Recent Labs   Lab 12/22/21  0740 12/21/21  0747 12/20/21  0744 " 12/19/21  0858   * 130* 129* 131*   POTASSIUM 4.8 4.8 4.8 4.1   CHLORIDE 93* 93* 96 94   BIRGIT 9.4 9.5 9.8 9.3   CO2 36* 34* 29 33*   BUN 29 27 23 21   CR 1.00 1.03 0.86 1.07*   * 107* 158* 136*     INRNo lab results found in last 7 days.   BNPNo lab results found in last 7 days.  VENOUS BLOOD GASES  Recent Labs   Lab 12/18/21  1432 12/17/21  1312   PHV 7.37 7.36   PCO2V 56* 58*   PO2V 56* 34   HCO3V 32* 32*   MOSHE 5.6* 5.3*       Additional labs and/or comments:    IMAGING      CT Chest 12/17 -  IMPRESSION:  1.  No evidence of pulmonary embolism.  2.  Dilated main pulmonary arteries suggest chronic pulmonary arterial hypertension.  3.  Ectatic ascending thoracic aorta at 4.1 cm.  4.  Cardiomegaly, but no pleural effusion or evidence of pulmonary edema.    CXR 12/17 -  IMPRESSION: Prominent heart size similar to prior. Pulmonary  vascularity within normal limits. No airspace consolidation,  pneumothorax, or pleural effusion.    PFT & OTHER TESTING       ASSESSMENT / PLAN      Pulmonary Diagnoses:  Bronchitis acute  Cough R05  Hypoxemia R09.02  Resp fail acute J96.00  SOB R06.02  Bronchospasm    Additional COVID-19 diagnoses:  Concern of possible exposure to COVID-19, Now RULED OUT Z03.818    ASSESSMENT: 76-year-old female non-smoker, history of moderate aortic stenosis, chronic alcohol use, chronic pain syndrome, admitted with cough, wheezing and shortness of breath status post recent upper respiratory infection.  Chest x-ray on admission was clear.  CT scan of the chest was negative for PE and demonstrated dilated main pulmonary arteries consistent with chronic pulmonary hypertension but clear lung fields bilaterally.  Acute hypoxemic respiratory failure likely secondary to viral upper respiratory infection complicated by bronchospasm (reactive airways disease/bronchial hyperreactivity).  Patient has been treated with bronchodilators, antibiotics and steroids with clinical improvement, but is not yet back  to baseline. Has completed a course of Azithromycin but continues to have prodcutive cough - will add Mucinex to her regimen. Respiratory status currently stable on low flow O2.    PLAN:  1. Wean off O2, keep SaO2 > 90%  2. Bronchodilators - DuoNeb + Pulmicort nebs (transition to LABA/ICS inhaler at D/C).  3. Steroids - Prednisone taper.  4. Mucolytics - start Mucinex.  5. Increase activity and IS.  6. Recommend outpatient Pulmonary follow-up at [facility] for PFTs [phone].  7. D/C planning.      Nash Ventura M.D.    [facility]  [phone]   (pager)  [phone]   (office)

## 2021-12-23 NOTE — PLAN OF CARE
"Summary:  Elevated BNP, hyponatremia, hypoxia/SOB, ETOH   DATE & TIME: 12/22/21,8801-9738  Cognitive Concerns/ Orientation : Alert and oriented x 4, calm and cooperative   BEHAVIOR & AGGRESSION TOOL COLOR: Green  CIWA SCORE: 2, 2  (mild tremor)  ABNL VS/O2: VSS on RA- O2 mid 90s   MOBILITY: SBA with walker/gait belt, reporting feeling \"weak\" today. Ambulated in the halls x 1.   PAIN MANAGMENT: reporting mild HA, managed well with PRN tylenol and rest   DIET: Regular, tolerating-ate food from home  BOWEL/BLADDER: BS active x 4, continent B&B, Had 2 BM's today  ABNL LAB/BG: Sodium 131   DRAIN/DEVICES: Peripheral IV SL left upper arm   TELEMETRY RHYTHM: SD w/ 1st AV block, asymptomatic on this shift  SKIN: Scattered bruises forearms, intact   TESTS/PROCEDURES: N/A  D/C DAY/GOALS/PLACE: >2 days once ETOH resolved and improvement in Reactive Airway Disease, recommended home with assist   OTHER IMPORTANT INFO: Hx ETOH. LS diminished in all fields, Audible wheezing at times/improving- receiving nebs per RT, prednisone. Cough improving/less frequent, PRN Robitussin given. Pulmonary consulted. Daughter Nery updated on plan of care.   "

## 2021-12-24 ENCOUNTER — APPOINTMENT (OUTPATIENT)
Dept: OCCUPATIONAL THERAPY | Facility: CLINIC | Age: 76
DRG: 202 | End: 2021-12-24
Payer: COMMERCIAL

## 2021-12-24 ENCOUNTER — APPOINTMENT (OUTPATIENT)
Dept: PHYSICAL THERAPY | Facility: CLINIC | Age: 76
DRG: 202 | End: 2021-12-24
Payer: COMMERCIAL

## 2021-12-24 PROCEDURE — 99232 SBSQ HOSP IP/OBS MODERATE 35: CPT | Performed by: INTERNAL MEDICINE

## 2021-12-24 PROCEDURE — 97530 THERAPEUTIC ACTIVITIES: CPT | Mod: GP

## 2021-12-24 PROCEDURE — 94640 AIRWAY INHALATION TREATMENT: CPT | Mod: 76

## 2021-12-24 PROCEDURE — 250N000013 HC RX MED GY IP 250 OP 250 PS 637: Performed by: INTERNAL MEDICINE

## 2021-12-24 PROCEDURE — 97535 SELF CARE MNGMENT TRAINING: CPT | Mod: GO

## 2021-12-24 PROCEDURE — 120N000001 HC R&B MED SURG/OB

## 2021-12-24 PROCEDURE — 97116 GAIT TRAINING THERAPY: CPT | Mod: GP

## 2021-12-24 PROCEDURE — 250N000009 HC RX 250: Performed by: INTERNAL MEDICINE

## 2021-12-24 PROCEDURE — 97530 THERAPEUTIC ACTIVITIES: CPT | Mod: GO

## 2021-12-24 PROCEDURE — 250N000011 HC RX IP 250 OP 636: Performed by: INTERNAL MEDICINE

## 2021-12-24 PROCEDURE — 250N000012 HC RX MED GY IP 250 OP 636 PS 637: Performed by: INTERNAL MEDICINE

## 2021-12-24 PROCEDURE — 999N000157 HC STATISTIC RCP TIME EA 10 MIN

## 2021-12-24 PROCEDURE — 94640 AIRWAY INHALATION TREATMENT: CPT

## 2021-12-24 RX ADMIN — MIRTAZAPINE 15 MG: 15 TABLET, FILM COATED ORAL at 23:09

## 2021-12-24 RX ADMIN — FUROSEMIDE 20 MG: 20 TABLET ORAL at 08:52

## 2021-12-24 RX ADMIN — BUDESONIDE 1 MG: 0.5 INHALANT RESPIRATORY (INHALATION) at 08:26

## 2021-12-24 RX ADMIN — MULTIPLE VITAMINS W/ MINERALS TAB 1 TABLET: TAB at 08:51

## 2021-12-24 RX ADMIN — QUETIAPINE FUMARATE 150 MG: 150 TABLET, EXTENDED RELEASE ORAL at 23:09

## 2021-12-24 RX ADMIN — BUDESONIDE 0.5 MG: 0.5 INHALANT RESPIRATORY (INHALATION) at 19:23

## 2021-12-24 RX ADMIN — GABAPENTIN 300 MG: 300 CAPSULE ORAL at 23:09

## 2021-12-24 RX ADMIN — GABAPENTIN 300 MG: 300 CAPSULE ORAL at 08:52

## 2021-12-24 RX ADMIN — GUAIFENESIN AND DEXTROMETHORPHAN 10 ML: 100; 10 SYRUP ORAL at 14:29

## 2021-12-24 RX ADMIN — CITALOPRAM HYDROBROMIDE 20 MG: 20 TABLET ORAL at 08:52

## 2021-12-24 RX ADMIN — IPRATROPIUM BROMIDE AND ALBUTEROL SULFATE 3 ML: .5; 3 SOLUTION RESPIRATORY (INHALATION) at 03:07

## 2021-12-24 RX ADMIN — PRAVASTATIN SODIUM 20 MG: 20 TABLET ORAL at 08:52

## 2021-12-24 RX ADMIN — QUETIAPINE FUMARATE 25 MG: 25 TABLET ORAL at 19:12

## 2021-12-24 RX ADMIN — IPRATROPIUM BROMIDE AND ALBUTEROL SULFATE 3 ML: .5; 3 SOLUTION RESPIRATORY (INHALATION) at 08:26

## 2021-12-24 RX ADMIN — FOLIC ACID 1 MG: 1 TABLET ORAL at 08:52

## 2021-12-24 RX ADMIN — ENOXAPARIN SODIUM 40 MG: 40 INJECTION SUBCUTANEOUS at 10:57

## 2021-12-24 RX ADMIN — GUAIFENESIN 1200 MG: 600 TABLET, EXTENDED RELEASE ORAL at 23:09

## 2021-12-24 RX ADMIN — GABAPENTIN 300 MG: 300 CAPSULE ORAL at 15:55

## 2021-12-24 RX ADMIN — ATENOLOL 25 MG: 25 TABLET ORAL at 08:52

## 2021-12-24 RX ADMIN — PREDNISONE 40 MG: 20 TABLET ORAL at 08:52

## 2021-12-24 RX ADMIN — IPRATROPIUM BROMIDE AND ALBUTEROL SULFATE 3 ML: .5; 3 SOLUTION RESPIRATORY (INHALATION) at 12:13

## 2021-12-24 RX ADMIN — ASPIRIN 81 MG: 81 TABLET, COATED ORAL at 08:52

## 2021-12-24 RX ADMIN — GUAIFENESIN 1200 MG: 600 TABLET, EXTENDED RELEASE ORAL at 08:52

## 2021-12-24 RX ADMIN — IPRATROPIUM BROMIDE AND ALBUTEROL SULFATE 3 ML: .5; 3 SOLUTION RESPIRATORY (INHALATION) at 15:59

## 2021-12-24 RX ADMIN — LOSARTAN POTASSIUM 50 MG: 50 TABLET, FILM COATED ORAL at 08:52

## 2021-12-24 RX ADMIN — IPRATROPIUM BROMIDE AND ALBUTEROL SULFATE 3 ML: .5; 3 SOLUTION RESPIRATORY (INHALATION) at 19:23

## 2021-12-24 RX ADMIN — GUAIFENESIN AND DEXTROMETHORPHAN 10 ML: 100; 10 SYRUP ORAL at 02:54

## 2021-12-24 RX ADMIN — ACETAMINOPHEN 650 MG: 325 TABLET, FILM COATED ORAL at 14:29

## 2021-12-24 ASSESSMENT — ACTIVITIES OF DAILY LIVING (ADL)
ADLS_ACUITY_SCORE: 11
ADLS_ACUITY_SCORE: 9
ADLS_ACUITY_SCORE: 11
ADLS_ACUITY_SCORE: 9
ADLS_ACUITY_SCORE: 11
ADLS_ACUITY_SCORE: 9
ADLS_ACUITY_SCORE: 9
ADLS_ACUITY_SCORE: 11
ADLS_ACUITY_SCORE: 9
ADLS_ACUITY_SCORE: 11
ADLS_ACUITY_SCORE: 9
ADLS_ACUITY_SCORE: 9

## 2021-12-24 NOTE — PLAN OF CARE
Summary:  Elevated BNP, hyponatremia, hypoxia/SOB, ETOH   DATE & TIME: 12/24/21. 6200-0990   Cognitive Concerns/ Orientation: A/Ox 4, calm and cooperative   BEHAVIOR & AGGRESSION TOOL COLOR: Green  CIWA SCORE: 1, 1 (mild tremor)   ABNL VS/O2: VSS on RA- O2 mid 90s. 1L O2 may be needed at bedtime   MOBILITY: SBA with walker/gait belt, some generalized weakness, ambulated with OT in the halls this morning   PAIN MANAGMENT: denies, mild body aches-managed with PRN tylenol   DIET: Regular-tolerating   BOWEL/BLADDER: BS active x 4, continent B&B  ABNL LAB/BG: no new labs at this time  DRAIN/DEVICES: Peripheral IV SL left upper arm   TELEMETRY RHYTHM: NSR   SKIN: Scattered bruises forearms, intact   TESTS/PROCEDURES: N/A  D/C DAY/GOALS/PLACE: 1-2 days once  improvement in Reactive Airway Disease, recommending TCU. Recommended to follow up at lung clinic after discharge.   OTHER: LS diminished -expiratory wheezing improving. Infrequent productive cough -improving, on scheduled mucinex, PRN robitussin available. Continues on scheduled nebs. Pulmonology consulted-now signed off

## 2021-12-24 NOTE — PROGRESS NOTES
Care Management Follow Up    Length of Stay (days): 7    Expected Discharge Date: 12/24/2021     Concerns to be Addressed: discharge planning     Patient plan of care discussed at interdisciplinary rounds: Yes    Anticipated Discharge Disposition: Transitional Care     Anticipated Discharge Services: None  Anticipated Discharge DME:  (Possibly a nebulizer)    Patient/family educated on Medicare website which has current facility and service quality ratings: yes  Education Provided on the Discharge Plan:    Patient/Family in Agreement with the Plan: yes    Referrals Placed by CM/SW: Post Acute Facilities  Private pay costs discussed:     Additional Information:  A referral was made to [facility] however the program requires the patient be independent with self care, transfers and mobility.   [facility] provides PT and OT plus out patient CD treatment on site 4 afternoons a week.  Will discuss with pt/daughter.        ONEIL Capellan

## 2021-12-24 NOTE — PLAN OF CARE
Summary:  Elevated BNP, hyponatremia, hypoxia/SOB, ETOH   DATE & TIME: 12/23/21 3664-5179  Cognitive Concerns/ Orientation: A/Ox 4, calm and cooperative   BEHAVIOR & AGGRESSION TOOL COLOR: Green  CIWA SCORE: 1 (mild tremor)   ABNL VS/O2: VSS on RA- O2 mid 90s. 1L O2 may be needed at bedtime   MOBILITY: SBA with walker/gait belt, ambulated in hallways with minimal assist, up to chair frequently   PAIN MANAGMENT: denies   DIET: Regular-tolerating   BOWEL/BLADDER: BS active x 4, continent B&B  ABNL LAB/BG: Sodium 131, Cr 1.11    DRAIN/DEVICES: Peripheral IV SL left upper arm   TELEMETRY RHYTHM: SR w/ 1st AV block, asymptomatic on this shift  SKIN: Scattered bruises forearms, intact   TESTS/PROCEDURES: N/A  D/C DAY/GOALS/PLACE: 1-2 days once  improvement in Reactive Airway Disease, recommending TCU.  OTHER: LS diminished -wheezing improving/almost absent. Infrequent productive cough -improving, on scheduled mucinex, PRN robitussin available. Continues on scheduled nebs. Pulmonology consulted.

## 2021-12-24 NOTE — PROGRESS NOTES
Care Management Follow Up    Length of Stay (days): 7    Expected Discharge Date: 12/24/2021     Concerns to be Addressed: discharge planning     Patient plan of care discussed at interdisciplinary rounds: Yes    Anticipated Discharge Disposition: Transitional Care or Substance Use Treatment     Anticipated Discharge Services: None  Anticipated Discharge DME:  (Possibly a nebulizer)    Patient/family educated on Medicare website which has current facility and service quality ratings: yes  Education Provided on the Discharge Plan:    Patient/Family in Agreement with the Plan: yes    Referrals Placed by CM/SW: Post Acute Facilities  Private pay costs discussed:     Additional Information:  Met with daughter Nery while patient was working with PT and then later met with both pt and daughter.  Both Nery and patient would like patient to return to [facility] program where she was back in April for 23 days.  Reviewed [facility] requires patient to be physically independent and that under the Medicare guidelines the program assesses if patient has medical conditions which require ongoing monitoring.   A referral was made to [facility] on 12/23.  Writer contacted their admission office today at [phone] and requested a call back however am concerned their office may be closed today and doesn't open again till 12/27.     TCU referrals were sent yesterday as both PT and OT recommended TCU.   As of today PT notes patient could discharge home with home PT or directly into a Substance Use program such as Unit however OT continues to recommend TCU over patient returning home alone.  Patient has been  declined by 7 referrals due to either no bed available or due to patient's recent relapse with daily alcohol use.   [facility] would be a good option as the TCU also has the out patient Substance Use programming on site which meets for several hours M-Thursday afternoon, however the facility cannot determine admission  until Monday when insurance can be verified.   Based on distance patient is walking, not sure how long insurance will cover patient's stay in TCU.  Again non of his can be determined till Monday.    [facility] is the only in patient substance use program covered by Medicare.  The only option program is [facility]  Patient lives in [place] and daughter is not comfortable with patient driving herself to treatment.  Patient's income exceeds the income guidelines for state funding which could pay for residential programs. Reviewed with daughter.    Plan;  At this time, social workers will continue to make TCU referrals as long as OT recommends this level of care.  Availability or acceptance at [facility] cannot be determined till Monday.    Neither daughter or patient are comfortable with patient discharging home prior to receiving in patient treatment.   Reviewed above with Dr Duffy.          Elsa Goddard, NATIVIDADSW

## 2021-12-24 NOTE — PLAN OF CARE
Summary:  Elevated BNP, hyponatremia, hypoxia/SOB, ETOH   DATE & TIME: 12/23/21, 1187-4412  Cognitive Concerns/ Orientation: A/O x 4, calm and cooperative   BEHAVIOR & AGGRESSION TOOL COLOR: Green  CIWA SCORE: 1 (mild tremor )   ABNL VS/O2: VSS on RA- O2 mid 90s. 1L O2 may be needed at bedtime   MOBILITY: SBA with walker/gait belt, ambulated in hallways with minimal assist, up to chair frequently   PAIN MANAGMENT: denies   DIET: Regular-tolerating   BOWEL/BLADDER: BS active x 4, continent B&B  ABNL LAB/BG: Sodium 131, Cr 1.11    DRAIN/DEVICES: Peripheral IV SL left upper arm   TELEMETRY RHYTHM: SR w/ 1st AV block, asymptomatic on this shift  SKIN: Scattered bruises forearms, intact   TESTS/PROCEDURES: N/A  D/C DAY/GOALS/PLACE: 1-2 days once  improvement in Reactive Airway Disease, recommending TCU.  OTHER: LS diminished -wheezing improving/almost absent. Infrequent productive cough -improving, on scheduled mucinex, PRN robitussin available. Continues on scheduled nebs. Pulmonology consulted.

## 2021-12-24 NOTE — PLAN OF CARE
Summary:  Elevated BNP, hyponatremia, hypoxia/SOB, ETOH   DATE & TIME: 12/24/21. 6927-6314   Cognitive Concerns/ Orientation: A/Ox 4, calm and cooperative   BEHAVIOR & AGGRESSION TOOL COLOR: Green  CIWA SCORE: 1, 1 (mild tremor)   ABNL VS/O2: VSS on RA- O2 mid 90s. 1L O2 may be needed at bedtime   MOBILITY: SBA with walker/gait belt, some generalized weakness, ambulated with OT in the halls this morning   PAIN MANAGMENT: denies, mild body aches-managed with PRN tylenol   DIET: Regular-tolerating   BOWEL/BLADDER: BS active x 4, continent B&B  ABNL LAB/BG: no new labs at this time  DRAIN/DEVICES: Peripheral IV SL left upper arm   TELEMETRY RHYTHM: NSR   SKIN: Scattered bruises forearms, intact   TESTS/PROCEDURES: N/A  D/C DAY/GOALS/PLACE: 1-2 days once  improvement in Reactive Airway Disease, recommending TCU. Recommended to follow up at lung clinic after discharge.   OTHER: LS diminished -expiratory wheezing improving. Infrequent productive cough -improving, on scheduled mucinex, PRN robitussin available. Continues on scheduled nebs. Pulmonology consulted-now signed off

## 2021-12-24 NOTE — PROGRESS NOTES
"PULMONOLOGY PROGRESS NOTE    Date of Admission: 12/17/2021    CC/Reason for Hospital visit: hypoxemia, bronchospasm  SUBJECTIVE      Events of last night reviewed. Stable night. No new respiratory problems. States breathing is slightly better today.    ROS: A Problem Pertinent review of systems was negative except for items noted in HPI.  Past Medical, Family, and Social/Substance History has been reviewed: No interval changes.    OBJECTIVE   Vital signs:  Temp: 98.1  F (36.7  C) Temp src: Oral BP: 125/85 Pulse: 71   Resp: 16 SpO2: 96 % O2 Device: None (Room air) Oxygen Delivery: 1 LPM Height: 162.6 cm (5' 4\") Weight: 85.2 kg (187 lb 12.8 oz)  Estimated body mass index is 32.24 kg/m  as calculated from the following:    Height as of this encounter: 1.626 m (5' 4\").    Weight as of this encounter: 85.2 kg (187 lb 12.8 oz).        I/O last 3 completed shifts:  In: 520 [P.O.:520]  Out: 150 [Urine:150]      CONSTITUTIONAL/GENERAL APPEARANCE: Alert female. No Apparent Distress.  PSYCHIATRIC: Pleasant and appropriate mood and affect. Oriented x 3.  EARS, NOSE,THROAT,MOUTH: External ears and nose overall normal. Normal oral mucosa.   NECK: Neck appearance normal. No neck masses and the thyroid is not enlarged.   RESPIRATORY: Non-labored effort. Decreased BS, few exp wheezes.  CARDIOVASCULAR: S1, S2, regular rate and rhythm.      LABORATORY ASSESSMENT    Arterial Blood Gas  Recent Labs   Lab 12/18/21  1432 12/17/21  1312   O2PER 35 3     CBC  Recent Labs   Lab 12/23/21  0741 12/21/21  0747 12/19/21  0858 12/18/21  0340 12/17/21  1250   WBC  --  9.2 7.2 7.3 8.4   RBC  --  3.38* 3.42* 3.37* 3.67*   HGB  --  10.2* 10.2* 10.1* 11.0*   HCT  --  30.9* 31.4* 30.0* 33.0*   MCV  --  91 92 89 90   MCH  --  30.2 29.8 30.0 30.0   MCHC  --  33.0 32.5 33.7 33.3   RDW  --  14.6 14.7 14.1 13.8    325 255 229 265     BMP  Recent Labs   Lab 12/23/21  0741 12/22/21  0740 12/21/21  0747 12/20/21  0744   * 131* 130* 129* "   POTASSIUM 4.0 4.8 4.8 4.8   CHLORIDE 95 93* 93* 96   BIRGIT 9.1 9.4 9.5 9.8   CO2 33* 36* 34* 29   BUN 30 29 27 23   CR 1.11* 1.00 1.03 0.86   * 110* 107* 158*     INRNo lab results found in last 7 days.   BNPNo lab results found in last 7 days.  VENOUS BLOOD GASES  Recent Labs   Lab 12/18/21  1432 12/17/21  1312   PHV 7.37 7.36   PCO2V 56* 58*   PO2V 56* 34   HCO3V 32* 32*   MOSHE 5.6* 5.3*       Additional labs and/or comments:    IMAGING      CT Chest 12/17 -  IMPRESSION:  1.  No evidence of pulmonary embolism.  2.  Dilated main pulmonary arteries suggest chronic pulmonary arterial hypertension.  3.  Ectatic ascending thoracic aorta at 4.1 cm.  4.  Cardiomegaly, but no pleural effusion or evidence of pulmonary edema.    CXR 12/17 -  IMPRESSION: Prominent heart size similar to prior. Pulmonary  vascularity within normal limits. No airspace consolidation,  pneumothorax, or pleural effusion.    PFT & OTHER TESTING       ASSESSMENT / PLAN      Pulmonary Diagnoses:  Bronchitis acute  Cough R05  Hypoxemia R09.02  Resp fail acute J96.00  SOB R06.02  Bronchospasm    Additional COVID-19 diagnoses:  Concern of possible exposure to COVID-19, Now RULED OUT Z03.818    ASSESSMENT: 76-year-old female non-smoker, history of moderate aortic stenosis, chronic alcohol use, chronic pain syndrome, admitted with cough, wheezing and shortness of breath status post recent upper respiratory infection.  Chest x-ray on admission was clear.  CT scan of the chest was negative for PE and demonstrated dilated main pulmonary arteries consistent with chronic pulmonary hypertension but clear lung fields bilaterally.  Acute hypoxemic respiratory failure likely secondary to viral upper respiratory infection complicated by bronchospasm (reactive airways disease/bronchial hyperreactivity).  Patient has been treated with bronchodilators, antibiotics and steroids with slow clinical improvement. Respiratory status stable on room  air.    PLAN:  1. Bronchodilators - DuoNeb + Pulmicort nebs (transition to LABA/ICS inhaler at D/C).  2. Steroids - Prednisone taper.  3. Mucolytics - Mucinex.  4. Increase activity and IS.  5. Recommend outpatient Pulmonary follow-up at [facility] for PFTs [phone] (I gave patient our contact information).  6. OK for discharge anytime from Pulmonary standpoint.    No further suggestions at this time. Will sign off, please call if needed.      Nash Ventura M.D.    [facility]  [phone]   (pager)  [phone]   (office)

## 2021-12-24 NOTE — PROGRESS NOTES
[facility]    Medicine Progress Note - Hospitalist Service       Date of Admission:  12/17/2021  Assessment & Plan   Linda Headley is a 76 year-old female with past medical history of hypertension, hyperlipidemia, chronic alcohol use, aortic stenosis, depression/anxiety, and chronic pain syndrome, among other medical problems, who presents to Emergency Room for evaluation of shortness of breath, found to be hypoxic and admitted 12/17/2021.      Acute hypoxic respiratory failure  Viral URI with Reactive Airway Disease  *Presents with shortness of breath, found to be hypoxic and requiring 2L  *Procalcitonin negative, COVID/Influenza negative  *CT PE protocol negative for pulmonary embolism, suggestive of chronic pulmonary arterial hypertension, no infiltrates  *Given 3 days of azithromycin 500 mg.   - Pulmonary consulted, appreciate assistance  - Continue DuoNeb + Pulmicort nebulizers with plan to transition to LABA/ICS on discharge  - Continue prednisone taper  - Follow-up with [facility] including PFTs following discharge  - Stable on room air     Moderate to severe aortic stenosis  EF was intact on recent echocardiogram in 10/2021. Noted moderate aortic stenosis. No signs of pulmonary edema on initial CT.  - Continue prior to admission furosemide      Hypokalemia  Hypomagnesemia  Secondary to decreased oral intake and furosemide use.  - Monitor and replace per protocol     Hyponatremia  *Sodium 125 on admission. [place] to be multifactorial with chronic alcohol use contributing  *Initial urine studies un-interpretable as had received furosemide prior. Repeat with urine sodium 13, urine osm 219  - Continue prior to admission furosemide  - Sodium stable in low 130 range  - Monitor periodically at TCU      Alcohol use disorder, severe  Alcohol withdrawal  *Consumes 6 drinks per day  *Tremuluous, anxious, agitated with auditory and visual hallucinations during admission requiring lorazepam  *Chem dep  "consulted, per their note: \"Pt meets criteria for Alcohol use disorder-severe. Pt reports she attended [place] IP CD treatment last spring (2021) and was sober until September 2021. Pt reports her use has increased up to one Liter of Corin every 4 days. Pt reports she has attended AA meetings in the past and has not been going recently. Pt reports she would not be interested in attending IP CD treatment at this time. Pt reports she would like to resume attending AA meetings regularly. Pt reports she would like to receive CD resources so she is able to follow up if the would like to attend CD treatment.\"  - Continue prior to admission gabapentin  - Folate and vitamin B12 levels normal  - Stable off of CIWA      Depression  Anxiety  Chronic pain syndrome  - Continue prior to admission citalopram, gabapentin, hydroxyzine, quetiapine, mirtazapine, trazodone     Hypertension  - Continue prior to admission losartan, furosemide    Hyperlipidemia  - Continue prior to admission pravastatin    Clinically Significant Risk Factors Present on Admission                       Diet: Low Saturated Fat Na <2400 mg  Diet    DVT Prophylaxis: Enoxaparin (Lovenox) SQ  Montelongo Catheter: Not present  Code Status: Full Code      Disposition Plan   Expected Discharge: 12/24/2021  - Stable for discharge once placement found   Anticipated discharge location: inpatient rehabilitation facility    Delays:     Placement - TCU         Entered: Butch Duffy MD 12/24/2021, 10:10 AM       The patient's care was discussed with the Patient.    Butch Duffy MD  Hospitalist Service  [facility]    ______________________________________________________________________    Interval History   No acute events overnight.  Reports her breathing is stable on room air and overall much improved compared to admission.  Eating and drinking well.  Awaiting placement in TCU.  Denies any new symptoms today.    Data reviewed today: I " reviewed all medications, new labs and imaging results over the last 24 hours. I personally reviewed no images or EKG's today.    Physical Exam   Vital Signs: Temp: 98.1  F (36.7  C) Temp src: Oral BP: 125/85 Pulse: 71   Resp: 16 SpO2: 96 % O2 Device: None (Room air) Oxygen Delivery: 1 LPM  Weight: 187 lbs 12.8 oz    Constitutional: Well-appearing, NAD  Respiratory: Normal respiratory effort at rest, non-labored breathing  Cardiovascular: RRR.  GI: Soft, non-tender, non-distended.  Skin/Integumen: Warm, dry  Other:      Data   Recent Labs   Lab 12/23/21  0741 12/22/21  0740 12/21/21  0747 12/20/21  0744 12/19/21  0858 12/18/21  0758 12/18/21  0340 12/17/21 2019 12/17/21  1402   WBC  --   --  9.2  --  7.2  --  7.3  --   --    HGB  --   --  10.2*  --  10.2*  --  10.1*  --   --    MCV  --   --  91  --  92  --  89  --   --      --  325  --  255  --  229  --   --    * 131* 130*   < > 131*   < > 129*  --  125*   POTASSIUM 4.0 4.8 4.8   < > 4.1   < > 4.4  4.4   < > 3.2*   CHLORIDE 95 93* 93*   < > 94   < > 94  --  88*   CO2 33* 36* 34*   < > 33*   < > 31  --  30   BUN 30 29 27   < > 21   < > 13  --  9   CR 1.11* 1.00 1.03   < > 1.07*   < > 0.81  --  0.84   ANIONGAP 3 2* 3   < > 4   < > 4  --  7   BIRGIT 9.1 9.4 9.5   < > 9.3   < > 8.5  --  9.2   * 110* 107*   < > 136*   < > 111*  --  128*   ALBUMIN  --   --   --   --   --   --   --   --  3.7   PROTTOTAL  --   --   --   --   --   --   --   --  8.4   BILITOTAL  --   --   --   --   --   --   --   --  0.4   ALKPHOS  --   --   --   --   --   --   --   --  134   ALT  --   --   --   --   --   --   --   --  17   AST  --   --   --   --   --   --   --   --  19    < > = values in this interval not displayed.       No results found for this or any previous visit (from the past 24 hour(s)).  Medications     - MEDICATION INSTRUCTIONS -       - MEDICATION INSTRUCTIONS -         aspirin  81 mg Oral Daily     atenolol  25 mg Oral Daily     budesonide  1 mg Nebulization  BID     citalopram  20 mg Oral Daily     enoxaparin ANTICOAGULANT  40 mg Subcutaneous Q24H     folic acid  1 mg Oral Daily     furosemide  20 mg Oral Daily     gabapentin  300 mg Oral TID     guaiFENesin  1,200 mg Oral BID     ipratropium - albuterol 0.5 mg/2.5 mg/3 mL  3 mL Nebulization Q4H     losartan  50 mg Oral Daily     mirtazapine  15 mg Oral At Bedtime     multivitamin w/minerals  1 tablet Oral Daily     pravastatin  20 mg Oral Daily     predniSONE  40 mg Oral Daily     QUEtiapine  150 mg Oral At Bedtime     sodium chloride (PF)  3 mL Intracatheter Q8H

## 2021-12-25 LAB — SARS-COV-2 RNA RESP QL NAA+PROBE: NEGATIVE

## 2021-12-25 PROCEDURE — 250N000013 HC RX MED GY IP 250 OP 250 PS 637: Performed by: INTERNAL MEDICINE

## 2021-12-25 PROCEDURE — 99232 SBSQ HOSP IP/OBS MODERATE 35: CPT | Performed by: INTERNAL MEDICINE

## 2021-12-25 PROCEDURE — 120N000001 HC R&B MED SURG/OB

## 2021-12-25 PROCEDURE — 250N000011 HC RX IP 250 OP 636: Performed by: INTERNAL MEDICINE

## 2021-12-25 PROCEDURE — 250N000009 HC RX 250: Performed by: INTERNAL MEDICINE

## 2021-12-25 PROCEDURE — 94640 AIRWAY INHALATION TREATMENT: CPT | Mod: 76

## 2021-12-25 PROCEDURE — 250N000012 HC RX MED GY IP 250 OP 636 PS 637: Performed by: INTERNAL MEDICINE

## 2021-12-25 PROCEDURE — U0003 INFECTIOUS AGENT DETECTION BY NUCLEIC ACID (DNA OR RNA); SEVERE ACUTE RESPIRATORY SYNDROME CORONAVIRUS 2 (SARS-COV-2) (CORONAVIRUS DISEASE [COVID-19]), AMPLIFIED PROBE TECHNIQUE, MAKING USE OF HIGH THROUGHPUT TECHNOLOGIES AS DESCRIBED BY CMS-2020-01-R: HCPCS | Performed by: INTERNAL MEDICINE

## 2021-12-25 PROCEDURE — 999N000157 HC STATISTIC RCP TIME EA 10 MIN

## 2021-12-25 PROCEDURE — 94640 AIRWAY INHALATION TREATMENT: CPT

## 2021-12-25 RX ORDER — PREDNISONE 10 MG/1
10 TABLET ORAL DAILY
Status: DISCONTINUED | OUTPATIENT
Start: 2021-12-30 | End: 2021-12-27 | Stop reason: HOSPADM

## 2021-12-25 RX ORDER — PREDNISONE 20 MG/1
20 TABLET ORAL DAILY
Status: DISCONTINUED | OUTPATIENT
Start: 2021-12-28 | End: 2021-12-27 | Stop reason: HOSPADM

## 2021-12-25 RX ADMIN — FOLIC ACID 1 MG: 1 TABLET ORAL at 08:31

## 2021-12-25 RX ADMIN — PRAVASTATIN SODIUM 20 MG: 20 TABLET ORAL at 08:32

## 2021-12-25 RX ADMIN — GABAPENTIN 300 MG: 300 CAPSULE ORAL at 15:18

## 2021-12-25 RX ADMIN — ENOXAPARIN SODIUM 40 MG: 40 INJECTION SUBCUTANEOUS at 10:03

## 2021-12-25 RX ADMIN — FUROSEMIDE 20 MG: 20 TABLET ORAL at 08:31

## 2021-12-25 RX ADMIN — IPRATROPIUM BROMIDE AND ALBUTEROL SULFATE 3 ML: .5; 3 SOLUTION RESPIRATORY (INHALATION) at 15:18

## 2021-12-25 RX ADMIN — ASPIRIN 81 MG: 81 TABLET, COATED ORAL at 08:31

## 2021-12-25 RX ADMIN — MULTIPLE VITAMINS W/ MINERALS TAB 1 TABLET: TAB at 08:31

## 2021-12-25 RX ADMIN — LOSARTAN POTASSIUM 50 MG: 50 TABLET, FILM COATED ORAL at 08:31

## 2021-12-25 RX ADMIN — MELATONIN 5 MG TABLET 5 MG: at 04:13

## 2021-12-25 RX ADMIN — GUAIFENESIN 1200 MG: 600 TABLET, EXTENDED RELEASE ORAL at 08:31

## 2021-12-25 RX ADMIN — QUETIAPINE FUMARATE 50 MG: 25 TABLET ORAL at 04:13

## 2021-12-25 RX ADMIN — PREDNISONE 40 MG: 20 TABLET ORAL at 08:31

## 2021-12-25 RX ADMIN — MELATONIN 5 MG TABLET 5 MG: at 22:17

## 2021-12-25 RX ADMIN — IPRATROPIUM BROMIDE AND ALBUTEROL SULFATE 3 ML: .5; 3 SOLUTION RESPIRATORY (INHALATION) at 08:11

## 2021-12-25 RX ADMIN — QUETIAPINE FUMARATE 150 MG: 150 TABLET, EXTENDED RELEASE ORAL at 22:17

## 2021-12-25 RX ADMIN — GUAIFENESIN 1200 MG: 600 TABLET, EXTENDED RELEASE ORAL at 20:07

## 2021-12-25 RX ADMIN — MIRTAZAPINE 15 MG: 15 TABLET, FILM COATED ORAL at 22:17

## 2021-12-25 RX ADMIN — BUDESONIDE 1 MG: 0.5 INHALANT RESPIRATORY (INHALATION) at 08:10

## 2021-12-25 RX ADMIN — GABAPENTIN 300 MG: 300 CAPSULE ORAL at 08:31

## 2021-12-25 RX ADMIN — CITALOPRAM HYDROBROMIDE 20 MG: 20 TABLET ORAL at 08:31

## 2021-12-25 RX ADMIN — IPRATROPIUM BROMIDE AND ALBUTEROL SULFATE 3 ML: .5; 3 SOLUTION RESPIRATORY (INHALATION) at 00:01

## 2021-12-25 RX ADMIN — ATENOLOL 25 MG: 25 TABLET ORAL at 08:32

## 2021-12-25 RX ADMIN — GABAPENTIN 300 MG: 300 CAPSULE ORAL at 22:17

## 2021-12-25 ASSESSMENT — ACTIVITIES OF DAILY LIVING (ADL)
ADLS_ACUITY_SCORE: 11
ADLS_ACUITY_SCORE: 9
ADLS_ACUITY_SCORE: 11
ADLS_ACUITY_SCORE: 9
ADLS_ACUITY_SCORE: 9
ADLS_ACUITY_SCORE: 11
ADLS_ACUITY_SCORE: 11
ADLS_ACUITY_SCORE: 9
ADLS_ACUITY_SCORE: 11
ADLS_ACUITY_SCORE: 11
ADLS_ACUITY_SCORE: 9
ADLS_ACUITY_SCORE: 11
ADLS_ACUITY_SCORE: 9
ADLS_ACUITY_SCORE: 11
ADLS_ACUITY_SCORE: 9
ADLS_ACUITY_SCORE: 9

## 2021-12-25 NOTE — PLAN OF CARE
Summary:  Elevated BNP, hyponatremia, hypoxia/SOB, ETOH   DATE & TIME: 12/24/21-12/25/21. 4983-0329  Cognitive Concerns/ Orientation: A/Ox 4, calm and cooperative   BEHAVIOR & AGGRESSION TOOL COLOR: Green  CIWA SCORE: 1,1,1 (mild tremor)   ABNL VS/O2: VSS on RA- O2 low 90s. 1L O2 put on at night due to low O2    MOBILITY: SBA with walker/gait belt, some generalized weakness  PAIN MANAGMENT: denies  DIET: Regular-tolerating   BOWEL/BLADDER: BS active x 4, continent B&B  ABNL LAB/BG: no new labs at this time  DRAIN/DEVICES: Peripheral IV SL left upper arm   TELEMETRY RHYTHM: NSR   SKIN: Scattered bruises forearms, intact   TESTS/PROCEDURES: N/A  D/C DAY/GOALS/PLACE: 1-2 days once  improvement in Reactive Airway Disease, recommending TCU.  OTHER: LS diminished -expiratory wheezing improving. Infrequent productive cough -improving, on scheduled mucinex, PRN robitussin available. Continues on scheduled nebs. Pulmonology consulted.

## 2021-12-25 NOTE — PROGRESS NOTES
Care Management Follow Up    Length of Stay (days): 8    Expected Discharge Date: 12/24/2021     Concerns to be Addressed: discharge planning     Patient plan of care discussed at interdisciplinary rounds: Yes    Anticipated Discharge Disposition: Transitional Care     Anticipated Discharge Services: None  Anticipated Discharge DME:  (Possibly a nebulizer)    Patient/family educated on Medicare website which has current facility and service quality ratings: yes  Education Provided on the Discharge Plan:    Patient/Family in Agreement with the Plan: yes    Referrals Placed by CM/SW: Post Acute Facilities  Private pay costs discussed: Not applicable    Additional Information:  SW called TCU's that have not yet responded.  Admissions staff are not available at facilities today, SW will continue to follow.       Erica Garcia, LICSW

## 2021-12-25 NOTE — PROGRESS NOTES
[facility]    Medicine Progress Note - Hospitalist Service       Date of Admission:  12/17/2021  Assessment & Plan   Linda Headley is a 76 year-old female with past medical history of hypertension, hyperlipidemia, chronic alcohol use, aortic stenosis, depression/anxiety, and chronic pain syndrome, among other medical problems, who presents to Emergency Room for evaluation of shortness of breath, found to be hypoxic and admitted 12/17/2021.      Acute hypoxic respiratory failure  Viral URI with reactive airway disease  *Presents with shortness of breath, found to be hypoxic and requiring 2L  *Procalcitonin negative, COVID/Influenza negative  *CT PE protocol negative for pulmonary embolism, suggestive of chronic pulmonary arterial hypertension, no infiltrates  *Given 3 days of azithromycin 500 mg.   - Pulmonary consulted, appreciate assistance  - Continue DuoNeb + Pulmicort nebulizers with plan to transition to LABA/ICS on discharge  - Pulmonary recommending prednisone taper, has already received 8 days of steroids (as of 12/25) so will rapidly taper over the next few days  - Follow-up with [facility] including PFTs following discharge  - Mostly stable on room air, intermittently placed on 1L overnight     Moderate to severe aortic stenosis  EF was intact on recent echocardiogram in 10/2021. Noted moderate aortic stenosis. No signs of pulmonary edema on initial CT.  - Continue prior to admission furosemide      Hypokalemia  Hypomagnesemia  Secondary to decreased oral intake and furosemide use.  - Monitor and replace per protocol     Hyponatremia  *Sodium 125 on admission. [place] to be multifactorial with chronic alcohol use contributing  *Initial urine studies un-interpretable as had received furosemide prior. Repeat with urine sodium 13, urine osm 219  - Continue prior to admission furosemide  - Sodium stable in low 130 range  - Repeat BMP in AM      Alcohol use disorder, severe  Alcohol  "withdrawal  *Consumes 6 drinks per day  *Tremuluous, anxious, agitated with auditory and visual hallucinations during admission requiring lorazepam  *Chem dep consulted, per their note: \"Pt meets criteria for Alcohol use disorder-severe. Pt reports she attended [place] IP CD treatment last spring (2021) and was sober until September 2021. Pt reports her use has increased up to one Liter of Corin every 4 days. Pt reports she has attended AA meetings in the past and has not been going recently. Pt reports she would not be interested in attending IP CD treatment at this time. Pt reports she would like to resume attending AA meetings regularly. Pt reports she would like to receive CD resources so she is able to follow up if the would like to attend CD treatment.\"  - Continue prior to admission gabapentin  - Folate and vitamin B12 levels normal  - Stable off of CIWA      Depression  Anxiety  Chronic pain syndrome  - Continue prior to admission citalopram, gabapentin, hydroxyzine, quetiapine, mirtazapine, trazodone     Hypertension  - Continue prior to admission losartan, furosemide    Hyperlipidemia  - Continue prior to admission pravastatin    Clinically Significant Risk Factors Present on Admission                       Diet: Low Saturated Fat Na <2400 mg  Diet    DVT Prophylaxis: Enoxaparin (Lovenox) SQ  Montelongo Catheter: Not present  Code Status: Full Code      Disposition Plan   Expected Discharge:   - Stable for discharge once placement found   Anticipated discharge location: inpatient rehabilitation facility    Delays:     Placement - TCU         Entered: Butch Duffy MD 12/25/2021, 9:21 AM       The patient's care was discussed with the Patient, bedside RN. Left voicemail for patient's daughter.    Butch Duffy MD  Hospitalist Service  [facility]    ______________________________________________________________________    Interval History   No acute events overnight. Reports her " breathing feels stable compared to yesterday. Was placed on 1L oxygen briefly overnight, but currently oxygenating 98-99% on room air. Eating/drinking well.     Data reviewed today: I reviewed all medications, new labs and imaging results over the last 24 hours. I personally reviewed no images or EKG's today.    Physical Exam   Vital Signs: Temp: 98.4  F (36.9  C) Temp src: Oral BP: 102/53 Pulse: 60   Resp: 18 SpO2: 92 % O2 Device: None (Room air) Oxygen Delivery: 1 LPM  Weight: 187 lbs 12.8 oz    Constitutional: Well-appearing, NAD  Respiratory: Lungs clear to auscultation bilaterally, no wheezes, normal effort at rest.   Cardiovascular: RRR.  GI: Soft, non-tender, non-distended.  Skin/Integumen: Warm, dry  Other:      Data   Recent Labs   Lab 12/23/21  0741 12/22/21  0740 12/21/21  0747 12/20/21  0744 12/19/21  0858   WBC  --   --  9.2  --  7.2   HGB  --   --  10.2*  --  10.2*   MCV  --   --  91  --  92     --  325  --  255   * 131* 130*   < > 131*   POTASSIUM 4.0 4.8 4.8   < > 4.1   CHLORIDE 95 93* 93*   < > 94   CO2 33* 36* 34*   < > 33*   BUN 30 29 27   < > 21   CR 1.11* 1.00 1.03   < > 1.07*   ANIONGAP 3 2* 3   < > 4   BIRGIT 9.1 9.4 9.5   < > 9.3   * 110* 107*   < > 136*    < > = values in this interval not displayed.       No results found for this or any previous visit (from the past 24 hour(s)).  Medications     - MEDICATION INSTRUCTIONS -       - MEDICATION INSTRUCTIONS -         aspirin  81 mg Oral Daily     atenolol  25 mg Oral Daily     budesonide  1 mg Nebulization BID     citalopram  20 mg Oral Daily     enoxaparin ANTICOAGULANT  40 mg Subcutaneous Q24H     folic acid  1 mg Oral Daily     furosemide  20 mg Oral Daily     gabapentin  300 mg Oral TID     guaiFENesin  1,200 mg Oral BID     ipratropium - albuterol 0.5 mg/2.5 mg/3 mL  3 mL Nebulization Q4H     losartan  50 mg Oral Daily     mirtazapine  15 mg Oral At Bedtime     multivitamin w/minerals  1 tablet Oral Daily     pravastatin   20 mg Oral Daily     predniSONE  40 mg Oral Daily     QUEtiapine  150 mg Oral At Bedtime     sodium chloride (PF)  3 mL Intracatheter Q8H

## 2021-12-26 ENCOUNTER — APPOINTMENT (OUTPATIENT)
Dept: PHYSICAL THERAPY | Facility: CLINIC | Age: 76
DRG: 202 | End: 2021-12-26
Payer: COMMERCIAL

## 2021-12-26 LAB
ANION GAP SERPL CALCULATED.3IONS-SCNC: 5 MMOL/L (ref 3–14)
BUN SERPL-MCNC: 24 MG/DL (ref 7–30)
CALCIUM SERPL-MCNC: 8.9 MG/DL (ref 8.5–10.1)
CHLORIDE BLD-SCNC: 97 MMOL/L (ref 94–109)
CO2 SERPL-SCNC: 29 MMOL/L (ref 20–32)
CREAT SERPL-MCNC: 1.04 MG/DL (ref 0.52–1.04)
GFR SERPL CREATININE-BSD FRML MDRD: 55 ML/MIN/1.73M2
GLUCOSE BLD-MCNC: 131 MG/DL (ref 70–99)
PLATELET # BLD AUTO: 428 10E3/UL (ref 150–450)
POTASSIUM BLD-SCNC: 4.6 MMOL/L (ref 3.4–5.3)
SODIUM SERPL-SCNC: 131 MMOL/L (ref 133–144)

## 2021-12-26 PROCEDURE — 250N000009 HC RX 250: Performed by: INTERNAL MEDICINE

## 2021-12-26 PROCEDURE — 82310 ASSAY OF CALCIUM: CPT | Performed by: INTERNAL MEDICINE

## 2021-12-26 PROCEDURE — 250N000013 HC RX MED GY IP 250 OP 250 PS 637: Performed by: INTERNAL MEDICINE

## 2021-12-26 PROCEDURE — 85049 AUTOMATED PLATELET COUNT: CPT | Performed by: INTERNAL MEDICINE

## 2021-12-26 PROCEDURE — 36415 COLL VENOUS BLD VENIPUNCTURE: CPT | Performed by: INTERNAL MEDICINE

## 2021-12-26 PROCEDURE — 97116 GAIT TRAINING THERAPY: CPT | Mod: GP

## 2021-12-26 PROCEDURE — 99232 SBSQ HOSP IP/OBS MODERATE 35: CPT | Performed by: INTERNAL MEDICINE

## 2021-12-26 PROCEDURE — 250N000011 HC RX IP 250 OP 636: Performed by: INTERNAL MEDICINE

## 2021-12-26 PROCEDURE — 250N000012 HC RX MED GY IP 250 OP 636 PS 637: Performed by: INTERNAL MEDICINE

## 2021-12-26 PROCEDURE — 999N000157 HC STATISTIC RCP TIME EA 10 MIN

## 2021-12-26 PROCEDURE — 94640 AIRWAY INHALATION TREATMENT: CPT | Mod: 76

## 2021-12-26 PROCEDURE — 94640 AIRWAY INHALATION TREATMENT: CPT

## 2021-12-26 PROCEDURE — 120N000001 HC R&B MED SURG/OB

## 2021-12-26 RX ADMIN — IPRATROPIUM BROMIDE AND ALBUTEROL SULFATE 3 ML: .5; 3 SOLUTION RESPIRATORY (INHALATION) at 19:28

## 2021-12-26 RX ADMIN — ENOXAPARIN SODIUM 40 MG: 40 INJECTION SUBCUTANEOUS at 11:33

## 2021-12-26 RX ADMIN — BUDESONIDE 1 MG: 0.5 INHALANT RESPIRATORY (INHALATION) at 07:37

## 2021-12-26 RX ADMIN — PRAVASTATIN SODIUM 20 MG: 20 TABLET ORAL at 08:10

## 2021-12-26 RX ADMIN — ATENOLOL 25 MG: 25 TABLET ORAL at 08:10

## 2021-12-26 RX ADMIN — MULTIPLE VITAMINS W/ MINERALS TAB 1 TABLET: TAB at 08:10

## 2021-12-26 RX ADMIN — PREDNISONE 30 MG: 10 TABLET ORAL at 08:10

## 2021-12-26 RX ADMIN — GUAIFENESIN AND DEXTROMETHORPHAN 10 ML: 100; 10 SYRUP ORAL at 01:21

## 2021-12-26 RX ADMIN — GABAPENTIN 300 MG: 300 CAPSULE ORAL at 15:31

## 2021-12-26 RX ADMIN — GUAIFENESIN 1200 MG: 600 TABLET, EXTENDED RELEASE ORAL at 08:10

## 2021-12-26 RX ADMIN — GABAPENTIN 300 MG: 300 CAPSULE ORAL at 08:10

## 2021-12-26 RX ADMIN — ASPIRIN 81 MG: 81 TABLET, COATED ORAL at 08:10

## 2021-12-26 RX ADMIN — FOLIC ACID 1 MG: 1 TABLET ORAL at 08:10

## 2021-12-26 RX ADMIN — GUAIFENESIN AND DEXTROMETHORPHAN 10 ML: 100; 10 SYRUP ORAL at 08:10

## 2021-12-26 RX ADMIN — IPRATROPIUM BROMIDE AND ALBUTEROL SULFATE 3 ML: .5; 3 SOLUTION RESPIRATORY (INHALATION) at 07:38

## 2021-12-26 RX ADMIN — CITALOPRAM HYDROBROMIDE 20 MG: 20 TABLET ORAL at 08:10

## 2021-12-26 RX ADMIN — BUDESONIDE 1 MG: 0.5 INHALANT RESPIRATORY (INHALATION) at 19:29

## 2021-12-26 RX ADMIN — IPRATROPIUM BROMIDE AND ALBUTEROL SULFATE 3 ML: .5; 3 SOLUTION RESPIRATORY (INHALATION) at 14:49

## 2021-12-26 RX ADMIN — ALBUTEROL SULFATE 2 PUFF: 108 INHALANT RESPIRATORY (INHALATION) at 08:14

## 2021-12-26 RX ADMIN — QUETIAPINE FUMARATE 50 MG: 25 TABLET ORAL at 03:44

## 2021-12-26 RX ADMIN — LOSARTAN POTASSIUM 50 MG: 50 TABLET, FILM COATED ORAL at 08:10

## 2021-12-26 RX ADMIN — IPRATROPIUM BROMIDE AND ALBUTEROL SULFATE 3 ML: .5; 3 SOLUTION RESPIRATORY (INHALATION) at 11:33

## 2021-12-26 RX ADMIN — FUROSEMIDE 20 MG: 20 TABLET ORAL at 08:10

## 2021-12-26 ASSESSMENT — ACTIVITIES OF DAILY LIVING (ADL)
ADLS_ACUITY_SCORE: 9
ADLS_ACUITY_SCORE: 7
ADLS_ACUITY_SCORE: 9
ADLS_ACUITY_SCORE: 9
ADLS_ACUITY_SCORE: 7
ADLS_ACUITY_SCORE: 9
ADLS_ACUITY_SCORE: 7
ADLS_ACUITY_SCORE: 7
ADLS_ACUITY_SCORE: 9
ADLS_ACUITY_SCORE: 7
ADLS_ACUITY_SCORE: 9
ADLS_ACUITY_SCORE: 9
ADLS_ACUITY_SCORE: 7
ADLS_ACUITY_SCORE: 9
ADLS_ACUITY_SCORE: 7

## 2021-12-26 ASSESSMENT — MIFFLIN-ST. JEOR: SCORE: 1316.4

## 2021-12-26 NOTE — PLAN OF CARE
Summary:  Elevated BNP, hyponatremia, hypoxia/SOB, ETOH   DATE & TIME: 12/25/21 3866-0243  Cognitive Concerns/ Orientation: A/Ox 4, calm and cooperative   BEHAVIOR & AGGRESSION TOOL COLOR: Green  CIWA SCORE: 0, discontinued   ABNL VS/O2: VSS, O2 mid 90% RA, no signs of respiratory distress   MOBILITY: SBA with a cane.   PAIN MANAGMENT: denies  DIET: Regular-tolerating   BOWEL/BLADDER: BS+, continent B&B  ABNL LAB/BG: no new labs today   DRAIN/DEVICES: Peripheral IV SL left upper arm   TELEMETRY RHYTHM: discontinued   SKIN: Scattered bruises forearms, intact   TESTS/PROCEDURES: N/A  D/C DAY/GOALS/PLACE: 1-2 days pending TCU placement, SW following.   OTHER: LS diminished -expiratory wheezing improving. Infrequent nonproductive coughs. on scheduled mucinex. Continues on scheduled nebs. Pulmonology consulted, continuing prednisone taper and follow-up [facility] including PFTs outpatient. CIWA and tele discontinued. Asymptomatic Covid19 test today negative.

## 2021-12-26 NOTE — PLAN OF CARE
Cognitive Concerns/ Orientation: Alert and oriented x 4   BEHAVIOR & AGGRESSION TOOL COLOR: Green  CIWA SCORE: 0, discontinued   ABNL VS/O2: VSS on room air   MOBILITY: SBA with a cane.   PAIN MANAGMENT: Denies   DIET: Regular-tolerating   BOWEL/BLADDER: BS+, continent B&B  ABNL LAB/BG: No new labs today   DRAIN/DEVICES: Peripheral IV SL left hand   TELEMETRY RHYTHM: Discontinued   SKIN: Scattered bruises forearms, intact   TESTS/PROCEDURES: N/A  D/C DAY/GOALS/PLACE: 1-2 days pending TCU placement, SW following.   OTHER: LS diminished -expiratory wheezing improving. Infrequent nonproductive congested cough, on scheduled mucinex. Continues on scheduled nebs. Pulmonology consulted, continuing prednisone taper and follow-up [facility] including PFTs outpatient. CIWA and tele discontinued. Asymptomatic Covid 19 test Saturday, negative.

## 2021-12-26 NOTE — PLAN OF CARE
Summary:  Elevated BNP, hyponatremia, hypoxia/SOB, ETOH   DATE & TIME: 12/26/2021 [id number]  Cognitive Concerns/ Orientation: Alert and oriented x 4   BEHAVIOR & AGGRESSION TOOL COLOR: Green  CIWA SCORE: discontinued 12/25  ABNL VS/O2: VSS, O2 98% RA, Pt had exp wheezing with frequent dry coughs this morning, received prn albuterol inhaler and robitussin with some relief.   MOBILITY: Independent with a cane, steady.   PAIN MANAGMENT: 3/10 RUQ pain, declined intervention.   DIET: Regular-tolerating   BOWEL/BLADDER: BS+, continent B&B  ABNL LAB/BG: No new labs today   DRAIN/DEVICES: no IV access  TELEMETRY RHYTHM: Discontinued  SKIN: Scattered bruises forearms, intact   TESTS/PROCEDURES: N/A  D/C DAY/GOALS/PLACE: PT no longer recommending TCU. Anticipating discharge to [place] to evaluate for inpatient CD admission vs home.   OTHER: LS diminished -expiratory wheezing improving. Infrequent nonproductive coughs, on scheduled mucinex, prn Robitussion available. Continues on scheduled nebs. Pulmonology consulted, continuing prednisone taper and follow-up with [facility] including PFTs outpatient.

## 2021-12-26 NOTE — PROGRESS NOTES
[facility]    Medicine Progress Note - Hospitalist Service       Date of Admission:  12/17/2021  Assessment & Plan   Linda Headley is a 76 year-old female with past medical history of hypertension, hyperlipidemia, chronic alcohol use, aortic stenosis, depression/anxiety, and chronic pain syndrome, among other medical problems, who presents to Emergency Room for evaluation of shortness of breath, found to be hypoxic and admitted 12/17/2021.      Acute hypoxic respiratory failure  Viral URI with reactive airway disease  *Presents with shortness of breath, found to be hypoxic and requiring 2L  *Procalcitonin negative, COVID/Influenza negative  *CT PE protocol negative for pulmonary embolism, suggestive of chronic pulmonary arterial hypertension, no infiltrates  *Given 3 days of azithromycin 500 mg.   - Pulmonary consulted, appreciate assistance  - Continue DuoNeb + Pulmicort nebulizers with plan to transition to LABA/ICS on discharge  - Pulmonary recommending prednisone taper, rapid taper over the next few days ordered  - Follow-up with [facility] including PFTs following discharge  - Stable on room air     Moderate to severe aortic stenosis  EF was intact on recent echocardiogram in 10/2021. Noted moderate aortic stenosis. No signs of pulmonary edema on initial CT.  - Continue prior to admission furosemide      Hypokalemia  Hypomagnesemia  Secondary to decreased oral intake and furosemide use.  - Monitor and replace per protocol     Hyponatremia  *Sodium 125 on admission. [place] to be multifactorial with chronic alcohol use contributing  *Initial urine studies un-interpretable as had received furosemide prior. Repeat with urine sodium 13, urine osm 219  - Continue prior to admission furosemide  - Sodium stable in low 130 range  - Repeat BMP 12/26 pending     Alcohol use disorder, severe  Alcohol withdrawal  *Consumes 6 drinks per day  *Tremuluous, anxious, agitated with auditory and visual  "hallucinations during admission requiring lorazepam  *Chem dep consulted, per their note: \"Pt meets criteria for Alcohol use disorder-severe. Pt reports she attended [facility] IP CD treatment last spring (2021) and was sober until September 2021. Pt reports her use has increased up to one Liter of [place] every 4 days. Pt reports she has attended AA meetings in the past and has not been going recently. Pt reports she would not be interested in attending IP CD treatment at this time. Pt reports she would like to resume attending AA meetings regularly. Pt reports she would like to receive CD resources so she is able to follow up if the would like to attend CD treatment.\"  *Folate and vitamin B12 levels normal  - Continue prior to admission gabapentin  - Stable off of CIWA      Depression  Anxiety  Chronic pain syndrome  - Continue prior to admission citalopram, gabapentin, hydroxyzine, quetiapine, mirtazapine, trazodone     Hypertension  - Continue prior to admission losartan, furosemide    Hyperlipidemia  - Continue prior to admission pravastatin    Clinically Significant Risk Factors Present on Admission                       Diet: Low Saturated Fat Na <2400 mg  Diet    DVT Prophylaxis: Enoxaparin (Lovenox) SQ  [place] Catheter: Not present  Code Status: Full Code      Disposition Plan   Expected Discharge: Progressing with therapy, so TCU no longer anticipated. Plan for [facility] to evaluate for inpatient chem dep admission 12/27/21, if not meeting criteria or no bed available anticipate discharge home.   Entered: Butch Duffy MD 12/26/2021, 9:07 AM       The patient's care was discussed with the Patient      Butch Duffy MD  Hospitalist Service  [facility]    ______________________________________________________________________    Interval History   No acute events overnight. Stable on room air overnight. Reports still some intermittent shortness of breath and wheezing with activity.  " Progressing well with therapy and now anticipated that she will be able to discharge home rather than TCU. Reports some intermittent discomfort right lower chest wall, worse with coughing. Currently resolved.     Data reviewed today: I reviewed all medications, new labs and imaging results over the last 24 hours. I personally reviewed no images or EKG's today.    Physical Exam   Vital Signs: Temp: 98  F (36.7  C) Temp src: Oral BP: 102/49 Pulse: 73   Resp: 18 SpO2: 98 % O2 Device: None (Room air)    Weight: 187 lbs 11.2 oz    Constitutional: Well-appearing, NAD  Respiratory: Lungs with scattered rhonchi, normal effort at rest.   Cardiovascular: RRR.  GI: Soft, non-tender, non-distended.  Skin/Integumen: Warm, dry  Other:      Data   Recent Labs   Lab 12/23/21  0741 12/22/21  0740 12/21/21  0747   WBC  --   --  9.2   HGB  --   --  10.2*   MCV  --   --  91     --  325   * 131* 130*   POTASSIUM 4.0 4.8 4.8   CHLORIDE 95 93* 93*   CO2 33* 36* 34*   BUN 30 29 27   CR 1.11* 1.00 1.03   ANIONGAP 3 2* 3   BIRGIT 9.1 9.4 9.5   * 110* 107*       No results found for this or any previous visit (from the past 24 hour(s)).  Medications     - MEDICATION INSTRUCTIONS -       - MEDICATION INSTRUCTIONS -         aspirin  81 mg Oral Daily     atenolol  25 mg Oral Daily     budesonide  1 mg Nebulization BID     citalopram  20 mg Oral Daily     enoxaparin ANTICOAGULANT  40 mg Subcutaneous Q24H     folic acid  1 mg Oral Daily     furosemide  20 mg Oral Daily     gabapentin  300 mg Oral TID     guaiFENesin  1,200 mg Oral BID     ipratropium - albuterol 0.5 mg/2.5 mg/3 mL  3 mL Nebulization Q4H     losartan  50 mg Oral Daily     mirtazapine  15 mg Oral At Bedtime     multivitamin w/minerals  1 tablet Oral Daily     pravastatin  20 mg Oral Daily     predniSONE  30 mg Oral Daily    Followed by     [START ON 12/28/2021] predniSONE  20 mg Oral Daily    Followed by     [START ON 12/30/2021] predniSONE  10 mg Oral Daily      QUEtiapine  150 mg Oral At Bedtime     sodium chloride (PF)  3 mL Intracatheter Q8H

## 2021-12-27 VITALS
DIASTOLIC BLOOD PRESSURE: 62 MMHG | OXYGEN SATURATION: 94 % | BODY MASS INDEX: 31.84 KG/M2 | WEIGHT: 186.5 LBS | TEMPERATURE: 98 F | HEART RATE: 81 BPM | HEIGHT: 64 IN | RESPIRATION RATE: 16 BRPM | SYSTOLIC BLOOD PRESSURE: 109 MMHG

## 2021-12-27 PROCEDURE — 250N000009 HC RX 250: Performed by: INTERNAL MEDICINE

## 2021-12-27 PROCEDURE — 250N000013 HC RX MED GY IP 250 OP 250 PS 637: Performed by: INTERNAL MEDICINE

## 2021-12-27 PROCEDURE — 99239 HOSP IP/OBS DSCHRG MGMT >30: CPT | Performed by: INTERNAL MEDICINE

## 2021-12-27 PROCEDURE — 999N000157 HC STATISTIC RCP TIME EA 10 MIN

## 2021-12-27 PROCEDURE — 94640 AIRWAY INHALATION TREATMENT: CPT

## 2021-12-27 PROCEDURE — 250N000011 HC RX IP 250 OP 636: Performed by: INTERNAL MEDICINE

## 2021-12-27 PROCEDURE — 94640 AIRWAY INHALATION TREATMENT: CPT | Mod: 76

## 2021-12-27 PROCEDURE — 250N000012 HC RX MED GY IP 250 OP 636 PS 637: Performed by: INTERNAL MEDICINE

## 2021-12-27 RX ORDER — ALBUTEROL SULFATE 90 UG/1
2 AEROSOL, METERED RESPIRATORY (INHALATION) EVERY 6 HOURS PRN
Qty: 18 G | Refills: 0 | Status: SHIPPED | OUTPATIENT
Start: 2021-12-27 | End: 2022-01-13

## 2021-12-27 RX ORDER — PREDNISONE 10 MG/1
TABLET ORAL
Qty: 6 TABLET | Refills: 0 | Status: ON HOLD | OUTPATIENT
Start: 2021-12-28 | End: 2021-12-31

## 2021-12-27 RX ORDER — LOSARTAN POTASSIUM 50 MG/1
50 TABLET ORAL DAILY
Qty: 30 TABLET | Refills: 0 | Status: SHIPPED | OUTPATIENT
Start: 2021-12-27 | End: 2022-01-13

## 2021-12-27 RX ADMIN — IPRATROPIUM BROMIDE AND ALBUTEROL SULFATE 3 ML: .5; 3 SOLUTION RESPIRATORY (INHALATION) at 12:13

## 2021-12-27 RX ADMIN — IPRATROPIUM BROMIDE AND ALBUTEROL SULFATE 3 ML: .5; 3 SOLUTION RESPIRATORY (INHALATION) at 09:05

## 2021-12-27 RX ADMIN — MIRTAZAPINE 15 MG: 15 TABLET, FILM COATED ORAL at 00:41

## 2021-12-27 RX ADMIN — LOSARTAN POTASSIUM 50 MG: 50 TABLET, FILM COATED ORAL at 10:15

## 2021-12-27 RX ADMIN — GUAIFENESIN 1200 MG: 600 TABLET, EXTENDED RELEASE ORAL at 10:15

## 2021-12-27 RX ADMIN — FUROSEMIDE 20 MG: 20 TABLET ORAL at 10:16

## 2021-12-27 RX ADMIN — ENOXAPARIN SODIUM 40 MG: 40 INJECTION SUBCUTANEOUS at 10:16

## 2021-12-27 RX ADMIN — MELATONIN 5 MG TABLET 5 MG: at 00:40

## 2021-12-27 RX ADMIN — GABAPENTIN 300 MG: 300 CAPSULE ORAL at 00:40

## 2021-12-27 RX ADMIN — GABAPENTIN 300 MG: 300 CAPSULE ORAL at 10:15

## 2021-12-27 RX ADMIN — BUDESONIDE 1 MG: 0.5 INHALANT RESPIRATORY (INHALATION) at 09:06

## 2021-12-27 RX ADMIN — ACETAMINOPHEN 650 MG: 325 TABLET, FILM COATED ORAL at 00:40

## 2021-12-27 RX ADMIN — GABAPENTIN 300 MG: 300 CAPSULE ORAL at 15:48

## 2021-12-27 RX ADMIN — CITALOPRAM HYDROBROMIDE 20 MG: 20 TABLET ORAL at 10:15

## 2021-12-27 RX ADMIN — MULTIPLE VITAMINS W/ MINERALS TAB 1 TABLET: TAB at 10:15

## 2021-12-27 RX ADMIN — ASPIRIN 81 MG: 81 TABLET, COATED ORAL at 10:16

## 2021-12-27 RX ADMIN — PRAVASTATIN SODIUM 20 MG: 20 TABLET ORAL at 10:15

## 2021-12-27 RX ADMIN — ATENOLOL 25 MG: 25 TABLET ORAL at 10:16

## 2021-12-27 RX ADMIN — GUAIFENESIN 600 MG: 600 TABLET, EXTENDED RELEASE ORAL at 00:43

## 2021-12-27 RX ADMIN — QUETIAPINE FUMARATE 150 MG: 150 TABLET, EXTENDED RELEASE ORAL at 00:39

## 2021-12-27 RX ADMIN — FOLIC ACID 1 MG: 1 TABLET ORAL at 10:15

## 2021-12-27 RX ADMIN — PREDNISONE 30 MG: 10 TABLET ORAL at 10:15

## 2021-12-27 RX ADMIN — ACETAMINOPHEN 650 MG: 325 TABLET, FILM COATED ORAL at 15:48

## 2021-12-27 ASSESSMENT — ACTIVITIES OF DAILY LIVING (ADL)
ADLS_ACUITY_SCORE: 7

## 2021-12-27 ASSESSMENT — MIFFLIN-ST. JEOR: SCORE: 1310.96

## 2021-12-27 NOTE — PLAN OF CARE
Physical Therapy Discharge Summary    Reason for therapy discharge:    Discharged to home.    Progress towards therapy goal(s). See goals on Care Plan in [facility] electronic health record for goal details.  Goals partially met.  Barriers to achieving goals:   discharge from facility and Discharge today at 16:30 per notes to home with home care services.    Therapy recommendation(s):    Continue home exercise program.  Mod I sit <> stand transfers, Mod I gait with SEC, SBA stairs. Will benefit from home PT services for ongoing strength, activity tolerance and balance training for optimal safety with functional mobility

## 2021-12-27 NOTE — PROGRESS NOTES
Summary:  Elevated BNP, hyponatremia, hypoxia/SOB, ETOH   DATE & TIME: 12/26/2021 Night    Cognitive Concerns/ Orientation : Alert/Oriented x 4   BEHAVIOR & AGGRESSION TOOL COLOR: Green   ABNL VS/O2: VSS, room air  MOBILITY: Independent with cane  PAIN MANAGMENT: Tylenol for back pain  DIET: Regular  BOWEL/BLADDER: Continent  ABNL LAB/BG: Sodium 131  DRAIN/DEVICES: No IV access  SKIN: Scattered bruising  TESTS/PROCEDURES: n/a  D/C DATE: Pending, plan for [facility] to assess for Chem Dep admission versus home, possibly 12/27  OTHER IMPORTANT INFO: Scheduled Mucinex, nebulizers, PRN Robitussin; coarse lung sounds

## 2021-12-27 NOTE — PROGRESS NOTES
Care Management Follow Up    Length of Stay (days): 10    Expected Discharge Date: 12/27/2021     Concerns to be Addressed: discharge planning     Patient plan of care discussed at interdisciplinary rounds: Yes    Anticipated Discharge Disposition: Transitional Care     Anticipated Discharge Services: None  Anticipated Discharge DME:  (Possibly a nebulizer)    Patient/family educated on Medicare website which has current facility and service quality ratings: yes  Education Provided on the Discharge Plan:    Patient/Family in Agreement with the Plan: yes    Referrals Placed by CM/SW: Post Acute Facilities  Private pay costs discussed:     Additional Information:  [facility] admissions coordinator for In patient CD treatment has declined patient as clinical data does not support ongoing medical need and her Substance Use Assessment doesn't meet the criteria needed for In patient treatment.  PLAN:    Writer will update daughter Nery and patient.  Writer is also waiting for OT session today scheduled at 1145 for their current recommendation.  When OT last saw patient on 12/24 they were still recommending TCU due to patient living alone.       Elsa Goddard, NATIVIDADSW

## 2021-12-27 NOTE — DISCHARGE SUMMARY
[facility]  Hospitalist Discharge Summary       Date of Admission:  12/17/2021  Date of Discharge:  12/27/2021  Discharging Provider: Butch Duffy MD      Discharge Diagnoses   Acute hypoxic respiratory failure  Viral URI with reactive airway disease  Moderate to severe aortic stenosis  Hypokalemia  Hypomagnesemia  Hyponatremia  Alcohol use disorder, severe  Alcohol withdrawal  Depression  Anxiety  Chronic pain syndrome  Hypertension  Hyperlipidemia    Follow-ups Needed After Discharge   Follow-up Appointments     Follow Up and recommended labs and tests      - Recommend outpatient Pulmonary follow-up at [facility] for PFTs   [phone], please schedule.    - Follow-up with primary care provider in 1 week for hospital follow-up  - Follow-up with chemical dependency outpatient as recommended             Hospital Course   Linda Headley is a 76 year-old female with past medical history of hypertension, hyperlipidemia, chronic alcohol use, aortic stenosis, depression/anxiety, and chronic pain syndrome, among other medical problems, who presents to Emergency Room for evaluation of shortness of breath, found to be hypoxic and admitted 12/17/2021.      Acute hypoxic respiratory failure  Viral URI with reactive airway disease  *Presents with shortness of breath, found to be hypoxic and requiring 2L  *Procalcitonin negative, COVID/Influenza negative  *CT PE protocol negative for pulmonary embolism, suggestive of chronic pulmonary arterial hypertension, no infiltrates  *Given 3 days of azithromycin 500 mg.   - Pulmonary ([facility]) consulted during admission  - Symptoms improved with steroids, discharged with short taper   - Discharged with LABA/ICS  - Follow-up with pulmonary with PFTs as outpatient  - Stable on room air at discharge     Moderate to severe aortic stenosis  EF was intact on recent echocardiogram in 10/2021. Noted moderate aortic stenosis. No signs of pulmonary edema on initial  "CT.  - Continue prior to admission furosemide      Hypokalemia  Hypomagnesemia  Secondary to decreased oral intake and furosemide use.  - Monitored and replace per protocol     Hyponatremia  *Sodium 125 on admission. [place] to be multifactorial with chronic alcohol use contributing  *Initial urine studies un-interpretable as had received furosemide prior. Repeat with urine sodium 13, urine osm 219  - Continue prior to admission furosemide  - Sodium stable in low 130 range     Alcohol use disorder, severe  Alcohol withdrawal  *Consumes 6 drinks per day  *Tremuluous, anxious, agitated with auditory and visual hallucinations during admission requiring lorazepam  *Chem dep consulted, per their note: \"Pt meets criteria for Alcohol use disorder-severe. Pt reports she attended [facility] IP CD treatment last spring (2021) and was sober until September 2021. Pt reports her use has increased up to one Liter of Corin every 4 days. Pt reports she has attended AA meetings in the past and has not been going recently. Pt reports she would not be interested in attending IP CD treatment at this time. Pt reports she would like to resume attending AA meetings regularly. Pt reports she would like to receive CD resources so she is able to follow up if the would like to attend CD treatment.\"  *Folate and vitamin B12 levels normal  - Continue prior to admission gabapentin  - Stable off of CIWA   - Attempted to arrange admission to [facility] for inpatient treatment, declined.   - Discussed management strategies for sobriety as outpatient. Follow-up with outpatient chemical dependency options.      Depression  Anxiety  Chronic pain syndrome  - Continue prior to admission citalopram, gabapentin, hydroxyzine, quetiapine, mirtazapine, trazodone     Hypertension  - Continue prior to admission losartan, furosemide    Hyperlipidemia  - Continue prior to admission pravastatin    Consultations This Hospital Stay   PULMONARY IP CONSULT  PHYSICAL THERAPY " ADULT IP CONSULT  OCCUPATIONAL THERAPY ADULT IP CONSULT  CHEMICAL DEPENDENCY IP CONSULT  CARE MANAGEMENT / SOCIAL WORK IP CONSULT  PHYSICAL THERAPY ADULT IP CONSULT  OCCUPATIONAL THERAPY ADULT IP CONSULT    Code Status   Prior    Time Spent on this Encounter   I, Butch Duffy MD, personally saw the patient today and spent greater than 30 minutes discharging this patient.       Butch Duffy MD  [facility]  ______________________________________________________________________    Physical Exam   Vital Signs: Temp: 98  F (36.7  C) Temp src: Oral BP: (!) 141/82 Pulse: 70   Resp: 16 SpO2: 97 % O2 Device: None (Room air)    Weight: 186 lbs 8.01 oz    Constitutional: Well-appearing, NAD  Respiratory: Clear to auscultation bilaterally, good air movement bilaterally  Cardiovascular: RRR. No peripheral edema.  GI: Soft, non-tender, non-distended.    Skin/Integumen: Warm, dry  Other:        Primary Care Physician   Nayeli Castorena    Discharge Disposition   Discharged to home  Condition at discharge: Stable      Discharge Orders      Home care nursing referral      Home Care OT Referral for Hospital Discharge      Home Care PT Referral for Hospital Discharge      Follow Up and recommended labs and tests    - Recommend outpatient Pulmonary follow-up at [facility] for PFTs [phone], please schedule.    - Follow-up with primary care provider in 1 week for hospital follow-up  - Follow-up with chemical dependency outpatient as recommended     Activity    Your activity upon discharge: activity as tolerated     Reason for your hospital stay    You were hospitalized for a viral infection of the large airways in your lungs (bronchitis)     MD face to face encounter    Documentation of Face to Face and Certification for Home Health Services    I certify that patient: Kavitha Headley is under my care and that I, or a nurse practitioner or physician's assistant working with me, had a  face-to-face encounter that meets the physician face-to-face encounter requirements with this patient on: 12/27/2021.    This encounter with the patient was in whole, or in part, for the following medical condition, which is the primary reason for home health care: Tracheobronchitis.    I certify that, based on my findings, the following services are medically necessary home health services: Nursing, Occupational Therapy, and Physical Therapy.    My clinical findings support the need for the above services because: Nurse is needed: To assess respiratory and cardiac status, hydration, nutrition and bowel status, and home safety after changes in medications or other medical regimen, Occupational Therapy Services are needed to assess and treat cognitive ability and address ADL safety due to impairment in functional immobility, and Physical Therapy Services are needed to assess and treat the following functional impairments: physical deconditioning.    Further, I certify that my clinical findings support that this patient is homebound (i.e. absences from home require considerable and taxing effort and are for medical reasons or [employer] or infrequently or of short duration when for other reasons) because: Requires assistance of another person or specialized equipment to access medical services because patient: Requires supervision of another for safe transfer...    Based on the above findings. I certify that this patient is confined to the home and needs intermittent skilled nursing care, physical therapy and/or speech therapy.  The patient is under my care, and I have initiated the establishment of the plan of care.  This patient will be followed by a physician who will periodically review the plan of care.  Physician/Provider to provide follow up care: Nayeli Castorena    Attending hospital physician (the Medicare certified PEC provider): Butch Duffy MD  Physician Signature: See electronic  signature associated with these discharge orders.  Date: 12/27/2021     Diet    Follow this diet upon discharge: Regular diet     Discharge Medications   Current Discharge Medication List      START taking these medications    Details   albuterol (PROAIR HFA/PROVENTIL HFA/VENTOLIN HFA) 108 (90 Base) MCG/ACT inhaler Inhale 2 puffs into the lungs every 6 hours as needed for wheezing  Qty: 18 g, Refills: 0    Comments: Pharmacy may dispense brand covered by insurance (Proair, or proventil or ventolin or generic albuterol inhaler)  Associated Diagnoses: Mild intermittent reactive airway disease with acute exacerbation      ipratropium - albuterol 0.5 mg/2.5 mg/3 mL (DUONEB) 0.5-2.5 (3) MG/3ML neb solution Take 1 vial (3 mLs) by nebulization 4 times daily  Qty: 90 mL    Associated Diagnoses: Mild intermittent reactive airway disease with acute exacerbation      mometasone-formoterol (DULERA) 200-5 MCG/ACT inhaler Inhale 2 puffs into the lungs 2 times daily  Qty: 13 g, Refills: 0    Comments: Please substitute to any equivalent medication that is more affordable / insurance preferred.  Associated Diagnoses: Reactive airway disease with acute exacerbation, unspecified asthma severity, unspecified whether persistent      predniSONE (DELTASONE) 10 MG tablet Take 2 tablets (20 mg) by mouth daily for 2 days, THEN 1 tablet (10 mg) daily for 2 days.  Qty: 6 tablet, Refills: 0    Associated Diagnoses: Mild intermittent reactive airway disease with acute exacerbation         CONTINUE these medications which have CHANGED    Details   losartan (COZAAR) 50 MG tablet Take 1 tablet (50 mg) by mouth daily  Qty: 30 tablet, Refills: 0    Comments: Future refills by PCP Dr. Nayeli Castorena with phone number [phone].  Associated Diagnoses: Benign essential hypertension      thiamine (B-1) 100 MG tablet Take 1 tablet (100 mg) by mouth daily for 7 doses  Qty: 7 tablet, Refills: 0    Associated Diagnoses: Alcohol withdrawal syndrome  without complication (H)         CONTINUE these medications which have NOT CHANGED    Details   acamprosate (CAMPRAL) 333 MG EC tablet Take 2 tablets (666 mg) by mouth 3 times daily  Qty: 180 tablet, Refills: 3    Associated Diagnoses: Alcohol use disorder, severe, in early remission (H)      acetaminophen (TYLENOL) 500 MG tablet Take 1,000 mg by mouth 2 times daily as needed for pain      aspirin 81 MG EC tablet Take 81 mg by mouth daily      atenolol (TENORMIN) 25 MG tablet Take 1 tablet (25 mg) by mouth daily  Qty: 100 tablet, Refills: 3      citalopram (CELEXA) 20 MG tablet Take 1 tablet (20 mg) by mouth daily  Qty: 90 tablet, Refills: 0    Associated Diagnoses: Mild major depression (H); Anxiety      diclofenac (VOLTAREN) 50 MG EC tablet Take 1 tablet (50 mg) by mouth 2 times daily  Qty: 60 tablet, Refills: 1    Associated Diagnoses: Chronic pain syndrome      folic acid (FOLVITE) 1 MG tablet Take 1 mg by mouth daily      furosemide (LASIX) 20 MG tablet TAKE 1 TABLET DAILY  Qty: 100 tablet, Refills: 4    Associated Diagnoses: Edema, unspecified type      gabapentin (NEURONTIN) 300 MG capsule Take 1 capsule (300 mg) by mouth 3 times daily  Qty: 270 capsule, Refills: 1    Associated Diagnoses: Chronic pain syndrome      guaiFENesin-dextromethorphan (ROBITUSSIN DM) 100-10 MG/5ML syrup Take 10 mLs by mouth every 4 hours as needed for cough      mirtazapine (REMERON) 15 MG tablet Take 1 tablet (15 mg) by mouth At Bedtime  Qty: 30 tablet, Refills: 3    Associated Diagnoses: Chronic insomnia; Mild major depression (H)      Multiple Vitamins-Minerals (CENTRUM SILVER) per tablet Take 1 tablet by mouth daily      polyethylene glycol (MIRALAX) 17 GM/Dose powder Take 17 g by mouth 2 times daily as needed for constipation      pravastatin (PRAVACHOL) 20 MG tablet Take 1 tablet (20 mg) by mouth daily  Qty: 100 tablet, Refills: 4      QUEtiapine (SEROQUEL XR) 150 MG TB24 24 hr tablet Take 1 tablet (150 mg) by mouth At  Bedtime  Qty: 30 tablet, Refills: 1    Associated Diagnoses: Chronic insomnia      QUEtiapine (SEROQUEL) 50 MG tablet Take 1 tablet (50 mg) by mouth nightly as needed (Anxiety, insomnia, hallucinations)  Qty: 30 tablet, Refills: 8    Associated Diagnoses: Chronic insomnia      senna-docusate (SENOKOT-S/PERICOLACE) 8.6-50 MG tablet Take 1 tablet by mouth 2 times daily  Qty:      Associated Diagnoses: Slow transit constipation      traZODone (DESYREL) 100 MG tablet TAKE 1 TABLET NIGHTLY AS NEEDED FOR SLEEP  Qty: 90 tablet, Refills: 2    Comments: YOUR PATIENT HAS REQUESTED A REFILL OF THIS MEDICATION, PREVIOUSLY AUTHORIZED BY ANOTHER PRESCRIBER.  Associated Diagnoses: Chronic insomnia         STOP taking these medications       hydrOXYzine (ATARAX) 25 MG tablet Comments:   Reason for Stopping:         methocarbamol (ROBAXIN) 500 MG tablet Comments:   Reason for Stopping:               Significant Results and Procedures   Most Recent 3 CBC's:  Recent Labs   Lab Test 12/26/21  1132 12/23/21  0741 12/21/21  0747 12/19/21  0858 12/18/21  0340   WBC  --   --  9.2 7.2 7.3   HGB  --   --  10.2* 10.2* 10.1*   MCV  --   --  91 92 89    338 325 255 229     Most Recent 3 BMP's:  Recent Labs   Lab Test 12/26/21  1132 12/23/21  0741 12/22/21  0740   * 131* 131*   POTASSIUM 4.6 4.0 4.8   CHLORIDE 97 95 93*   CO2 29 33* 36*   BUN 24 30 29   CR 1.04 1.11* 1.00   ANIONGAP 5 3 2*   BIRGIT 8.9 9.1 9.4   * 125* 110*     Most Recent 2 LFT's:  Recent Labs   Lab Test 12/17/21  1402 10/20/21  1251   AST 19 19   ALT 17 17   ALKPHOS 134 80   BILITOTAL 0.4 0.4     Most Recent 3 INR's:  Recent Labs   Lab Test 03/30/21  0000 06/16/17  0845 06/11/17  1540   INR 0.9 1.03 1.15*     Most Recent 3 Troponin's:  Recent Labs   Lab Test 03/23/21  1506 02/12/20  0534 02/12/20  0049   TROPI <0.015 <0.015 <0.015     Most Recent 3 BNP's:  Recent Labs   Lab Test 12/17/21  1402 03/23/21  1506   NTBNPI 2,896* 814     Most Recent Cholesterol  Panel:  Recent Labs   Lab Test 10/20/21  1251   CHOL 218*   LDL 85   HDL 86   TRIG 234*     Most Recent 6 Bacteria Isolates From Any Culture (See EPIC Reports for Culture Details):  Recent Labs   Lab Test 06/06/17  1530 06/06/17  1457 04/30/14  1815 02/07/14  1445   CULT No growth No growth >100,000 colonies/mL Escherichia coli* >100,000 colonies/mL Mixed gram negative and positive johana Multiple species present, probable perineal contamination. Susceptibility testing not routinely done     Most Recent TSH and T4:  Recent Labs   Lab Test 03/03/21  1612   TSH 1.05     Most Recent Hemoglobin A1c:No lab results found.  Most Recent Urinalysis:  Recent Labs   Lab Test 03/24/21  0025 12/04/19  1549   COLOR Light Yellow Yellow   APPEARANCE Slightly Cloudy Clear   URINEGLC Negative Negative   URINEBILI Negative Negative   URINEKETONE Negative Negative   SG 1.008 1.010   UBLD Negative Negative   URINEPH 5.5 5.5   PROTEIN Negative Negative   UROBILINOGEN  --  0.2   NITRITE Negative Negative   LEUKEST Large* Trace*   RBCU 3* O - 2   WBCU 86* 0 - 5     Most Recent ABG:No lab results found.  Most Recent ESR & CRP:No lab results found.,   Results for orders placed or performed during the hospital encounter of 12/17/21   XR Chest Port 1 View    Narrative    CHEST ONE VIEW PORTABLE December 17, 2021 1:23 PM     HISTORY: Cough, shortness of breath.    COMPARISON: 3/23/2021.      Impression    IMPRESSION: Prominent heart size similar to prior. Pulmonary  vascularity within normal limits. No airspace consolidation,  pneumothorax, or pleural effusion.    KELLEY MOORE MD         SYSTEM ID:  [id number]   CT Chest Pulmonary Embolism w Contrast    Narrative    CT CHEST PULMONARY EMBOLISM WITH CONTRAST 12/17/2021 3:45 PM    CLINICAL HISTORY: PE suspected, high probability. Cough, hypoxia,  clear x-ray.    TECHNIQUE: CT angiogram chest during arterial phase injection IV  contrast. 2D and 3D MIP reconstructions were performed by the  CT  technologist. Dose reduction techniques were used.     CONTRAST: 68mL [id number]    COMPARISON: 2/13/2020.    FINDINGS:  ANGIOGRAM CHEST: Main pulmonary artery is dilated at 3.9 cm. No  evidence of pulmonary embolism. The ascending thoracic aorta is  dilated at 4.1 cm. No evidence of aortic dissection.    LUNGS AND PLEURA: No airspace consolidation or pleural effusion.  Incidental calcified granuloma in the right upper lobe.    MEDIASTINUM/AXILLAE: The heart is enlarged. No thoracic or axillary  lymphadenopathy.    CORONARY ARTERY CALCIFICATION: Moderate.    UPPER ABDOMEN: Previous gastric bypass surgery.    MUSCULOSKELETAL: No destructive bone lesions.      Impression    IMPRESSION:  1.  No evidence of pulmonary embolism.  2.  Dilated main pulmonary arteries suggest chronic pulmonary arterial  hypertension.  3.  Ectatic ascending thoracic aorta at 4.1 cm.  4.  Cardiomegaly, but no pleural effusion or evidence of pulmonary  edema.    KELLEY MOORE MD         SYSTEM ID:  [id number]     *Note: Due to a large number of results and/or encounters for the requested time period, some results have not been displayed. A complete set of results can be found in Results Review.       Allergies   Allergies   Allergen Reactions     Bactrim [Sulfamethoxazole W/Trimethoprim] Hives     Codeine Itching     NAUSEA     Morphine Itching     NAUSEA

## 2021-12-27 NOTE — PROGRESS NOTES
"Care Management Discharge Note    Discharge Date: 12/27/2021       Discharge Disposition: Transitional Care    Discharge Services: None    Discharge DME:  (Possibly a nebulizer)    Discharge Transportation: family or friend will provide    Private pay costs discussed: Not applicable    PAS Confirmation Code:  N/A  Patient/family educated on Medicare website which has current facility and service quality ratings: yes    Education Provided on the Discharge Plan:  yes  Persons Notified of Discharge Plans: patient and Dtr. Nery  Patient/Family in Agreement with the Plan: yes    Handoff Referral Completed: Yes    Additional Information:  Reviewed with patient and dtr. Nery, with patient's permission.  [facility] program declines patient as she doesn't meet the medical and substance abuse criteria for In-patient treatment.  PT doesn't recommend TCU at this time and OT has not seen patient today. [facility] offered patient admission however had not obtained insurance authorization yet.  Writer discussed discharge to home with home therapy vs a short stay at [facility] pending insurance authorization. Patient declined the [facility] facility stating \"it's an old facility\".  Patient stated she would like home care.   Reviewed with Dr Duffy.    Daughter Nery will transport patient today at 1630 and will be at door #2.  Referral has been sent to [facility] as patient's PCP is thru a [facility] clinic.    Awaiting to hear if they can accept the home care referral for RN,PT and OT.   Daughter aware some families offer to take patient's car keys and credit cards to impede patient ability to obtain alcohol.    Met with patient again and reviewed writer is working on arranging home care services.  We also spoke about AA resources she can access thru her PC.  Patient reports her S.O. did throw away all alcohol in her home         Elsa Goddard, LICSW      "

## 2021-12-27 NOTE — PLAN OF CARE
Occupational Therapy Discharge Summary    Reason for therapy discharge:    Discharged to home with home therapy.    Progress towards therapy goal(s). See goals on Care Plan in [facility] electronic health record for goal details.  Goals partially met.  Barriers to achieving goals:   discharge from facility.    Therapy recommendation(s):    Continued therapy is recommended.  Rationale/Recommendations:  pt discharged home with home care, would benefit from home safety eval and home OT to address ADL/IADL deficits.

## 2021-12-27 NOTE — PROGRESS NOTES
Discharge    Patient discharged to home via wheelchair with daughter  Care plan note - completed    Listed belongings gathered and given to patient (including from security/pharmacy). Yes  Care Plan and Patient education resolved: Yes  Prescriptions if needed, hard copies sent with patient  Yes  Medication Bin checked and emptied on discharge Yes  SW/care coordinator/charge RN aware of discharge: Yes

## 2021-12-27 NOTE — PLAN OF CARE
Summary:  Elevated BNP, hyponatremia, hypoxia/SOB, ETOH   DATE & TIME: 12/26/2021 3056-9047  Cognitive Concerns/ Orientation: Alert and oriented x 4   BEHAVIOR & AGGRESSION TOOL COLOR: Green  CIWA SCORE: discontinued 12/25  ABNL VS/O2: VSS RA   MOBILITY: Independent with a cane, steady.   PAIN MANAGMENT: denied pain  DIET: Regular-tolerating   BOWEL/BLADDER: BS+, continent B&B  ABNL LAB/BG: No new labs today   DRAIN/DEVICES: no IV access   TELEMETRY RHYTHM: Discontinued  SKIN: Scattered bruises forearms, intact   TESTS/PROCEDURES: N/A  D/C DAY/GOALS/PLACE: PT no longer recommending TCU. Anticipating discharge to [facility] to evaluate for inpatient CD admission vs home.   OTHER: LS diminished -expiratory wheezing improving. Infrequent nonproductive coughs, on scheduled mucinex, prn Robitussion available. Continues on scheduled nebs. Pulmonology consulted, continuing prednisone taper and follow-up with [facility] including PFTs outpatient.

## 2021-12-27 NOTE — PROGRESS NOTES
Care Management Follow Up    Length of Stay (days): 10    Expected Discharge Date: 12/27/2021     Concerns to be Addressed: discharge planning     Patient plan of care discussed at interdisciplinary rounds: Yes    Anticipated Discharge Disposition: Transitional Care vs [facility] In Patient program vs home with home therapies     Anticipated Discharge Services: None  Anticipated Discharge DME:  (Possibly a nebulizer)    Patient/family educated on Medicare website which has current facility and service quality ratings: yes  Education Provided on the Discharge Plan:    Patient/Family in Agreement with the Plan: yes    Referrals Placed by CM/SW: Post Acute Facilities  Private pay costs discussed:     Additional Information:  OT is the discipline which was recommending TCU on 12/24 due to patient living alone.  OT has not seen patient since but is scheduled this morning.   Plan Await OT recommendation today and contact [facility] CD program.      ONEIL Capellan

## 2021-12-27 NOTE — PLAN OF CARE
Summary:  Elevated BNP, hyponatremia, hypoxia/SOB, ETOH   DATE & TIME: 12/27/2021 3364-0620 Cognitive Concerns/ Orientation : Alert/Oriented x 4   BEHAVIOR & AGGRESSION TOOL COLOR: Green   ABNL VS/O2: VSS, room air  MOBILITY: Independent with cane  PAIN MANAGMENT: denies pain  DIET: Regular  BOWEL/BLADDER: Continent  ABNL LAB/BG: none today  DRAIN/DEVICES: No IV access  SKIN: Scattered bruising  TESTS/PROCEDURES: n/a  D/C DATE: discharge home today 12/27, daughter to  1630  OTHER IMPORTANT INFO: Scheduled Mucinex, nebulizers, PRN Robitussin; coarse lung sounds

## 2021-12-28 ENCOUNTER — PATIENT OUTREACH (OUTPATIENT)
Dept: CARE COORDINATION | Facility: CLINIC | Age: 76
End: 2021-12-28
Payer: COMMERCIAL

## 2021-12-28 DIAGNOSIS — Z71.89 OTHER SPECIFIED COUNSELING: ICD-10-CM

## 2021-12-28 NOTE — PROGRESS NOTES
Clinic Care Coordination Contact  [facility]/Voicemail       Clinical Data: Care Coordinator Outreach  Outreach attempted x 1. Unable to leave message on patient's voicemail with call back information and requested return call.  Plan:  Care Coordinator will try to reach patient again in 1-2 business days.      ADILSON Whitmore  [phone]  [facility]

## 2021-12-29 ENCOUNTER — APPOINTMENT (OUTPATIENT)
Dept: CARDIOLOGY | Facility: CLINIC | Age: 76
End: 2021-12-29
Attending: HOSPITALIST
Payer: COMMERCIAL

## 2021-12-29 ENCOUNTER — HOSPITAL ENCOUNTER (OUTPATIENT)
Facility: CLINIC | Age: 76
Setting detail: OBSERVATION
Discharge: HOME | End: 2021-12-31
Attending: EMERGENCY MEDICINE | Admitting: INTERNAL MEDICINE
Payer: COMMERCIAL

## 2021-12-29 ENCOUNTER — APPOINTMENT (OUTPATIENT)
Dept: GENERAL RADIOLOGY | Facility: CLINIC | Age: 76
End: 2021-12-29
Attending: EMERGENCY MEDICINE
Payer: COMMERCIAL

## 2021-12-29 DIAGNOSIS — J10.1 INFLUENZA A: ICD-10-CM

## 2021-12-29 DIAGNOSIS — F41.9 ANXIETY: Primary | ICD-10-CM

## 2021-12-29 DIAGNOSIS — R06.02 SOB (SHORTNESS OF BREATH): ICD-10-CM

## 2021-12-29 DIAGNOSIS — R07.9 CHEST PAIN, UNSPECIFIED TYPE: ICD-10-CM

## 2021-12-29 DIAGNOSIS — J45.21 MILD INTERMITTENT REACTIVE AIRWAY DISEASE WITH ACUTE EXACERBATION: ICD-10-CM

## 2021-12-29 DIAGNOSIS — R09.02 HYPOXIA: ICD-10-CM

## 2021-12-29 LAB
ALBUMIN SERPL-MCNC: 3.3 G/DL (ref 3.4–5)
ALP SERPL-CCNC: 66 U/L (ref 40–150)
ALT SERPL W P-5'-P-CCNC: 25 U/L (ref 0–50)
ANION GAP SERPL CALCULATED.3IONS-SCNC: 6 MMOL/L (ref 3–14)
AST SERPL W P-5'-P-CCNC: 17 U/L (ref 0–45)
ATRIAL RATE - MUSE: 94 BPM
BASOPHILS # BLD AUTO: 0.1 10E3/UL (ref 0–0.2)
BASOPHILS NFR BLD AUTO: 0 %
BILIRUB SERPL-MCNC: 0.4 MG/DL (ref 0.2–1.3)
BUN SERPL-MCNC: 21 MG/DL (ref 7–30)
CALCIUM SERPL-MCNC: 8.5 MG/DL (ref 8.5–10.1)
CHLORIDE BLD-SCNC: 100 MMOL/L (ref 94–109)
CO2 SERPL-SCNC: 27 MMOL/L (ref 20–32)
CREAT SERPL-MCNC: 1.05 MG/DL (ref 0.52–1.04)
DIASTOLIC BLOOD PRESSURE - MUSE: NORMAL MMHG
EOSINOPHIL # BLD AUTO: 0 10E3/UL (ref 0–0.7)
EOSINOPHIL NFR BLD AUTO: 0 %
ERYTHROCYTE [DISTWIDTH] IN BLOOD BY AUTOMATED COUNT: 14.8 % (ref 10–15)
ETHANOL SERPL-MCNC: <0.01 G/DL
FLUAV RNA SPEC QL NAA+PROBE: POSITIVE
FLUBV RNA RESP QL NAA+PROBE: NEGATIVE
GFR SERPL CREATININE-BSD FRML MDRD: 54 ML/MIN/1.73M2
GLUCOSE BLD-MCNC: 142 MG/DL (ref 70–99)
HCT VFR BLD AUTO: 31.8 % (ref 35–47)
HGB BLD-MCNC: 10 G/DL (ref 11.7–15.7)
HOLD SPECIMEN: NORMAL
HOLD SPECIMEN: NORMAL
IMM GRANULOCYTES # BLD: 0.3 10E3/UL
IMM GRANULOCYTES NFR BLD: 1 %
INTERPRETATION ECG - MUSE: NORMAL
LACTATE SERPL-SCNC: 1.5 MMOL/L (ref 0.7–2)
LACTATE SERPL-SCNC: 2.6 MMOL/L (ref 0.7–2)
LVEF ECHO: NORMAL
LYMPHOCYTES # BLD AUTO: 0.7 10E3/UL (ref 0.8–5.3)
LYMPHOCYTES NFR BLD AUTO: 3 %
MCH RBC QN AUTO: 30.1 PG (ref 26.5–33)
MCHC RBC AUTO-ENTMCNC: 31.4 G/DL (ref 31.5–36.5)
MCV RBC AUTO: 96 FL (ref 78–100)
MONOCYTES # BLD AUTO: 1.2 10E3/UL (ref 0–1.3)
MONOCYTES NFR BLD AUTO: 5 %
NEUTROPHILS # BLD AUTO: 21 10E3/UL (ref 1.6–8.3)
NEUTROPHILS NFR BLD AUTO: 91 %
NRBC # BLD AUTO: 0 10E3/UL
NRBC BLD AUTO-RTO: 0 /100
P AXIS - MUSE: 19 DEGREES
PLATELET # BLD AUTO: 490 10E3/UL (ref 150–450)
POTASSIUM BLD-SCNC: 3.9 MMOL/L (ref 3.4–5.3)
PR INTERVAL - MUSE: 184 MS
PROCALCITONIN SERPL-MCNC: 0.05 NG/ML
PROT SERPL-MCNC: 7.5 G/DL (ref 6.8–8.8)
QRS DURATION - MUSE: 62 MS
QT - MUSE: 358 MS
QTC - MUSE: 447 MS
R AXIS - MUSE: -37 DEGREES
RBC # BLD AUTO: 3.32 10E6/UL (ref 3.8–5.2)
SARS-COV-2 RNA RESP QL NAA+PROBE: NEGATIVE
SODIUM SERPL-SCNC: 133 MMOL/L (ref 133–144)
SYSTOLIC BLOOD PRESSURE - MUSE: NORMAL MMHG
T AXIS - MUSE: 63 DEGREES
TROPONIN I SERPL HS-MCNC: 13 NG/L
TROPONIN I SERPL HS-MCNC: 16 NG/L
TROPONIN I SERPL HS-MCNC: 17 NG/L
VENTRICULAR RATE- MUSE: 94 BPM
WBC # BLD AUTO: 23.2 10E3/UL (ref 4–11)

## 2021-12-29 PROCEDURE — 250N000011 HC RX IP 250 OP 636: Performed by: EMERGENCY MEDICINE

## 2021-12-29 PROCEDURE — 94640 AIRWAY INHALATION TREATMENT: CPT | Mod: 76

## 2021-12-29 PROCEDURE — 99220 PR INITIAL OBSERVATION CARE,LEVEL III: CPT | Performed by: INTERNAL MEDICINE

## 2021-12-29 PROCEDURE — 94640 AIRWAY INHALATION TREATMENT: CPT

## 2021-12-29 PROCEDURE — 93005 ELECTROCARDIOGRAM TRACING: CPT

## 2021-12-29 PROCEDURE — 999N000208 ECHOCARDIOGRAM LIMITED

## 2021-12-29 PROCEDURE — 80053 COMPREHEN METABOLIC PANEL: CPT | Performed by: EMERGENCY MEDICINE

## 2021-12-29 PROCEDURE — 258N000003 HC RX IP 258 OP 636: Performed by: INTERNAL MEDICINE

## 2021-12-29 PROCEDURE — 36415 COLL VENOUS BLD VENIPUNCTURE: CPT | Performed by: EMERGENCY MEDICINE

## 2021-12-29 PROCEDURE — 87636 SARSCOV2 & INF A&B AMP PRB: CPT | Performed by: EMERGENCY MEDICINE

## 2021-12-29 PROCEDURE — 96374 THER/PROPH/DIAG INJ IV PUSH: CPT | Mod: XU

## 2021-12-29 PROCEDURE — 250N000009 HC RX 250: Performed by: HOSPITALIST

## 2021-12-29 PROCEDURE — 250N000013 HC RX MED GY IP 250 OP 250 PS 637: Performed by: NURSE PRACTITIONER

## 2021-12-29 PROCEDURE — 93321 DOPPLER ECHO F-UP/LMTD STD: CPT | Mod: 26 | Performed by: INTERNAL MEDICINE

## 2021-12-29 PROCEDURE — 93308 TTE F-UP OR LMTD: CPT | Mod: 26 | Performed by: INTERNAL MEDICINE

## 2021-12-29 PROCEDURE — 99207 PR NO CHARGE LOS: CPT | Performed by: NURSE PRACTITIONER

## 2021-12-29 PROCEDURE — 83605 ASSAY OF LACTIC ACID: CPT | Performed by: EMERGENCY MEDICINE

## 2021-12-29 PROCEDURE — 96376 TX/PRO/DX INJ SAME DRUG ADON: CPT | Mod: XU

## 2021-12-29 PROCEDURE — 82077 ASSAY SPEC XCP UR&BREATH IA: CPT | Performed by: EMERGENCY MEDICINE

## 2021-12-29 PROCEDURE — 255N000002 HC RX 255 OP 636: Performed by: INTERNAL MEDICINE

## 2021-12-29 PROCEDURE — 258N000003 HC RX IP 258 OP 636: Performed by: EMERGENCY MEDICINE

## 2021-12-29 PROCEDURE — 84484 ASSAY OF TROPONIN QUANT: CPT | Performed by: INTERNAL MEDICINE

## 2021-12-29 PROCEDURE — 999N000157 HC STATISTIC RCP TIME EA 10 MIN

## 2021-12-29 PROCEDURE — C9803 HOPD COVID-19 SPEC COLLECT: HCPCS

## 2021-12-29 PROCEDURE — C8924 2D TTE W OR W/O FOL W/CON,FU: HCPCS

## 2021-12-29 PROCEDURE — 85025 COMPLETE CBC W/AUTO DIFF WBC: CPT | Performed by: EMERGENCY MEDICINE

## 2021-12-29 PROCEDURE — 87040 BLOOD CULTURE FOR BACTERIA: CPT | Performed by: EMERGENCY MEDICINE

## 2021-12-29 PROCEDURE — 83605 ASSAY OF LACTIC ACID: CPT | Performed by: INTERNAL MEDICINE

## 2021-12-29 PROCEDURE — 250N000011 HC RX IP 250 OP 636: Performed by: HOSPITALIST

## 2021-12-29 PROCEDURE — 250N000013 HC RX MED GY IP 250 OP 250 PS 637: Performed by: INTERNAL MEDICINE

## 2021-12-29 PROCEDURE — G0378 HOSPITAL OBSERVATION PER HR: HCPCS

## 2021-12-29 PROCEDURE — 93325 DOPPLER ECHO COLOR FLOW MAPG: CPT | Mod: 26 | Performed by: INTERNAL MEDICINE

## 2021-12-29 PROCEDURE — 36415 COLL VENOUS BLD VENIPUNCTURE: CPT | Performed by: INTERNAL MEDICINE

## 2021-12-29 PROCEDURE — 99207 PR NO CHARGE LOS: CPT | Performed by: HOSPITALIST

## 2021-12-29 PROCEDURE — 99285 EMERGENCY DEPT VISIT HI MDM: CPT | Mod: 25

## 2021-12-29 PROCEDURE — 250N000013 HC RX MED GY IP 250 OP 250 PS 637: Performed by: HOSPITALIST

## 2021-12-29 PROCEDURE — 84484 ASSAY OF TROPONIN QUANT: CPT | Performed by: EMERGENCY MEDICINE

## 2021-12-29 PROCEDURE — 250N000012 HC RX MED GY IP 250 OP 636 PS 637: Performed by: HOSPITALIST

## 2021-12-29 PROCEDURE — 71045 X-RAY EXAM CHEST 1 VIEW: CPT

## 2021-12-29 PROCEDURE — 84145 PROCALCITONIN (PCT): CPT | Performed by: EMERGENCY MEDICINE

## 2021-12-29 RX ORDER — ACAMPROSATE CALCIUM 333 MG/1
666 TABLET, DELAYED RELEASE ORAL 3 TIMES DAILY
Status: DISCONTINUED | OUTPATIENT
Start: 2021-12-29 | End: 2021-12-31 | Stop reason: HOSPADM

## 2021-12-29 RX ORDER — GABAPENTIN 300 MG/1
300 CAPSULE ORAL 3 TIMES DAILY
Status: DISCONTINUED | OUTPATIENT
Start: 2021-12-29 | End: 2021-12-31 | Stop reason: HOSPADM

## 2021-12-29 RX ORDER — HYDROXYZINE HYDROCHLORIDE 25 MG/1
25 TABLET, FILM COATED ORAL EVERY 6 HOURS PRN
COMMUNITY
End: 2022-01-13

## 2021-12-29 RX ORDER — BISACODYL 10 MG
10 SUPPOSITORY, RECTAL RECTAL DAILY PRN
Status: DISCONTINUED | OUTPATIENT
Start: 2021-12-29 | End: 2021-12-31 | Stop reason: HOSPADM

## 2021-12-29 RX ORDER — QUETIAPINE FUMARATE 25 MG/1
50 TABLET, FILM COATED ORAL
Status: DISCONTINUED | OUTPATIENT
Start: 2021-12-29 | End: 2021-12-31 | Stop reason: HOSPADM

## 2021-12-29 RX ORDER — POLYETHYLENE GLYCOL 3350 17 G/17G
17 POWDER, FOR SOLUTION ORAL 2 TIMES DAILY PRN
Status: DISCONTINUED | OUTPATIENT
Start: 2021-12-29 | End: 2021-12-31 | Stop reason: HOSPADM

## 2021-12-29 RX ORDER — OSELTAMIVIR PHOSPHATE 30 MG/1
30 CAPSULE ORAL ONCE
Status: DISCONTINUED | OUTPATIENT
Start: 2021-12-29 | End: 2021-12-29

## 2021-12-29 RX ORDER — METHOCARBAMOL 500 MG/1
500 TABLET, FILM COATED ORAL 2 TIMES DAILY
COMMUNITY
End: 2022-02-08

## 2021-12-29 RX ORDER — AMOXICILLIN 250 MG
1 CAPSULE ORAL 2 TIMES DAILY
Status: DISCONTINUED | OUTPATIENT
Start: 2021-12-29 | End: 2021-12-31 | Stop reason: HOSPADM

## 2021-12-29 RX ORDER — LORAZEPAM 2 MG/ML
0.5 INJECTION INTRAMUSCULAR EVERY 4 HOURS PRN
Status: DISCONTINUED | OUTPATIENT
Start: 2021-12-29 | End: 2021-12-31 | Stop reason: HOSPADM

## 2021-12-29 RX ORDER — IPRATROPIUM BROMIDE AND ALBUTEROL SULFATE 2.5; .5 MG/3ML; MG/3ML
3 SOLUTION RESPIRATORY (INHALATION)
Status: DISCONTINUED | OUTPATIENT
Start: 2021-12-29 | End: 2021-12-29

## 2021-12-29 RX ORDER — QUETIAPINE 150 MG/1
150 TABLET, FILM COATED, EXTENDED RELEASE ORAL AT BEDTIME
Status: DISCONTINUED | OUTPATIENT
Start: 2021-12-29 | End: 2021-12-31 | Stop reason: HOSPADM

## 2021-12-29 RX ORDER — ONDANSETRON 4 MG/1
4 TABLET, ORALLY DISINTEGRATING ORAL EVERY 6 HOURS PRN
Status: DISCONTINUED | OUTPATIENT
Start: 2021-12-29 | End: 2021-12-31 | Stop reason: HOSPADM

## 2021-12-29 RX ORDER — MIRTAZAPINE 15 MG/1
15 TABLET, FILM COATED ORAL AT BEDTIME
Status: DISCONTINUED | OUTPATIENT
Start: 2021-12-29 | End: 2021-12-31 | Stop reason: HOSPADM

## 2021-12-29 RX ORDER — SODIUM CHLORIDE, SODIUM LACTATE, POTASSIUM CHLORIDE, CALCIUM CHLORIDE 600; 310; 30; 20 MG/100ML; MG/100ML; MG/100ML; MG/100ML
INJECTION, SOLUTION INTRAVENOUS CONTINUOUS
Status: ACTIVE | OUTPATIENT
Start: 2021-12-29 | End: 2021-12-30

## 2021-12-29 RX ORDER — PREDNISONE 5 MG/1
10 TABLET ORAL DAILY
Status: COMPLETED | OUTPATIENT
Start: 2021-12-29 | End: 2021-12-31

## 2021-12-29 RX ORDER — FOLIC ACID 1 MG/1
1 TABLET ORAL DAILY
Status: DISCONTINUED | OUTPATIENT
Start: 2021-12-29 | End: 2021-12-31 | Stop reason: HOSPADM

## 2021-12-29 RX ORDER — BENZONATATE 100 MG/1
100 CAPSULE ORAL 3 TIMES DAILY PRN
Status: DISCONTINUED | OUTPATIENT
Start: 2021-12-29 | End: 2021-12-31 | Stop reason: HOSPADM

## 2021-12-29 RX ORDER — HYDROXYZINE HYDROCHLORIDE 25 MG/1
25 TABLET, FILM COATED ORAL EVERY 6 HOURS PRN
Status: DISCONTINUED | OUTPATIENT
Start: 2021-12-29 | End: 2021-12-31 | Stop reason: HOSPADM

## 2021-12-29 RX ORDER — LORAZEPAM 2 MG/ML
0.5 INJECTION INTRAMUSCULAR ONCE
Status: COMPLETED | OUTPATIENT
Start: 2021-12-29 | End: 2021-12-29

## 2021-12-29 RX ORDER — ACETAMINOPHEN 650 MG/1
650 SUPPOSITORY RECTAL EVERY 4 HOURS PRN
Status: DISCONTINUED | OUTPATIENT
Start: 2021-12-29 | End: 2021-12-31 | Stop reason: HOSPADM

## 2021-12-29 RX ORDER — GUAIFENESIN/DEXTROMETHORPHAN 100-10MG/5
10 SYRUP ORAL EVERY 4 HOURS PRN
Status: DISCONTINUED | OUTPATIENT
Start: 2021-12-29 | End: 2021-12-31 | Stop reason: HOSPADM

## 2021-12-29 RX ORDER — TRAZODONE HYDROCHLORIDE 100 MG/1
100 TABLET ORAL
Status: DISCONTINUED | OUTPATIENT
Start: 2021-12-29 | End: 2021-12-31 | Stop reason: HOSPADM

## 2021-12-29 RX ORDER — CITALOPRAM HYDROBROMIDE 20 MG/1
20 TABLET ORAL DAILY
Status: DISCONTINUED | OUTPATIENT
Start: 2021-12-29 | End: 2021-12-31 | Stop reason: HOSPADM

## 2021-12-29 RX ORDER — IPRATROPIUM BROMIDE AND ALBUTEROL SULFATE 2.5; .5 MG/3ML; MG/3ML
3 SOLUTION RESPIRATORY (INHALATION)
Status: DISCONTINUED | OUTPATIENT
Start: 2021-12-29 | End: 2021-12-31 | Stop reason: HOSPADM

## 2021-12-29 RX ORDER — DICLOFENAC SODIUM 25 MG/1
50 TABLET, DELAYED RELEASE ORAL 2 TIMES DAILY
Status: DISCONTINUED | OUTPATIENT
Start: 2021-12-29 | End: 2021-12-31 | Stop reason: HOSPADM

## 2021-12-29 RX ORDER — ONDANSETRON 2 MG/ML
4 INJECTION INTRAMUSCULAR; INTRAVENOUS EVERY 6 HOURS PRN
Status: DISCONTINUED | OUTPATIENT
Start: 2021-12-29 | End: 2021-12-31 | Stop reason: HOSPADM

## 2021-12-29 RX ORDER — LOSARTAN POTASSIUM 50 MG/1
50 TABLET ORAL DAILY
Status: DISCONTINUED | OUTPATIENT
Start: 2021-12-29 | End: 2021-12-31 | Stop reason: HOSPADM

## 2021-12-29 RX ORDER — ATENOLOL 25 MG/1
25 TABLET ORAL DAILY
Status: DISCONTINUED | OUTPATIENT
Start: 2021-12-29 | End: 2021-12-31 | Stop reason: HOSPADM

## 2021-12-29 RX ORDER — PIPERACILLIN SODIUM, TAZOBACTAM SODIUM 3; .375 G/15ML; G/15ML
3.38 INJECTION, POWDER, LYOPHILIZED, FOR SOLUTION INTRAVENOUS ONCE
Status: DISCONTINUED | OUTPATIENT
Start: 2021-12-29 | End: 2021-12-29

## 2021-12-29 RX ORDER — METHOCARBAMOL 500 MG/1
500 TABLET, FILM COATED ORAL 2 TIMES DAILY
Status: DISCONTINUED | OUTPATIENT
Start: 2021-12-29 | End: 2021-12-31 | Stop reason: HOSPADM

## 2021-12-29 RX ORDER — ACETAMINOPHEN 325 MG/1
650 TABLET ORAL EVERY 4 HOURS PRN
Status: DISCONTINUED | OUTPATIENT
Start: 2021-12-29 | End: 2021-12-31 | Stop reason: HOSPADM

## 2021-12-29 RX ORDER — OSELTAMIVIR PHOSPHATE 30 MG/1
30 CAPSULE ORAL 2 TIMES DAILY
Status: DISCONTINUED | OUTPATIENT
Start: 2021-12-29 | End: 2021-12-31 | Stop reason: HOSPADM

## 2021-12-29 RX ADMIN — MIRTAZAPINE 15 MG: 15 TABLET, FILM COATED ORAL at 22:09

## 2021-12-29 RX ADMIN — DICLOFENAC SODIUM 50 MG: 25 TABLET, DELAYED RELEASE ORAL at 20:08

## 2021-12-29 RX ADMIN — QUETIAPINE FUMARATE 150 MG: 150 TABLET, EXTENDED RELEASE ORAL at 22:09

## 2021-12-29 RX ADMIN — SENNOSIDES AND DOCUSATE SODIUM 1 TABLET: 50; 8.6 TABLET ORAL at 20:07

## 2021-12-29 RX ADMIN — IPRATROPIUM BROMIDE AND ALBUTEROL SULFATE 3 ML: .5; 3 SOLUTION RESPIRATORY (INHALATION) at 15:51

## 2021-12-29 RX ADMIN — SODIUM CHLORIDE, POTASSIUM CHLORIDE, SODIUM LACTATE AND CALCIUM CHLORIDE: 600; 310; 30; 20 INJECTION, SOLUTION INTRAVENOUS at 09:50

## 2021-12-29 RX ADMIN — PREDNISONE 10 MG: 5 TABLET ORAL at 14:47

## 2021-12-29 RX ADMIN — ACAMPROSATE CALCIUM 666 MG: 333 TABLET, DELAYED RELEASE ORAL at 20:08

## 2021-12-29 RX ADMIN — HUMAN ALBUMIN MICROSPHERES AND PERFLUTREN 9 ML: 10; .22 INJECTION, SOLUTION INTRAVENOUS at 15:31

## 2021-12-29 RX ADMIN — SODIUM CHLORIDE 1000 ML: 9 INJECTION, SOLUTION INTRAVENOUS at 05:56

## 2021-12-29 RX ADMIN — LORAZEPAM 0.5 MG: 2 INJECTION INTRAMUSCULAR; INTRAVENOUS at 04:57

## 2021-12-29 RX ADMIN — TRAZODONE HYDROCHLORIDE 100 MG: 100 TABLET ORAL at 22:09

## 2021-12-29 RX ADMIN — SODIUM CHLORIDE, POTASSIUM CHLORIDE, SODIUM LACTATE AND CALCIUM CHLORIDE: 600; 310; 30; 20 INJECTION, SOLUTION INTRAVENOUS at 23:53

## 2021-12-29 RX ADMIN — GUAIFENESIN AND DEXTROMETHORPHAN 10 ML: 100; 10 SYRUP ORAL at 09:50

## 2021-12-29 RX ADMIN — IPRATROPIUM BROMIDE AND ALBUTEROL SULFATE 3 ML: .5; 3 SOLUTION RESPIRATORY (INHALATION) at 19:45

## 2021-12-29 RX ADMIN — GABAPENTIN 300 MG: 300 CAPSULE ORAL at 20:07

## 2021-12-29 RX ADMIN — ATENOLOL 25 MG: 25 TABLET ORAL at 14:48

## 2021-12-29 RX ADMIN — POLYETHYLENE GLYCOL 3350 17 G: 17 POWDER, FOR SOLUTION ORAL at 22:09

## 2021-12-29 RX ADMIN — BENZONATATE 100 MG: 100 CAPSULE ORAL at 16:25

## 2021-12-29 RX ADMIN — ACAMPROSATE CALCIUM 666 MG: 333 TABLET, DELAYED RELEASE ORAL at 14:59

## 2021-12-29 RX ADMIN — GABAPENTIN 300 MG: 300 CAPSULE ORAL at 14:48

## 2021-12-29 RX ADMIN — OSELTAMIVIR PHOSPHATE 30 MG: 30 CAPSULE ORAL at 12:37

## 2021-12-29 RX ADMIN — METHOCARBAMOL 500 MG: 500 TABLET ORAL at 20:07

## 2021-12-29 RX ADMIN — CITALOPRAM HYDROBROMIDE 20 MG: 20 TABLET ORAL at 14:48

## 2021-12-29 RX ADMIN — FOLIC ACID 1 MG: 1 TABLET ORAL at 14:47

## 2021-12-29 RX ADMIN — OSELTAMIVIR PHOSPHATE 30 MG: 30 CAPSULE ORAL at 20:07

## 2021-12-29 RX ADMIN — ACETAMINOPHEN 650 MG: 325 TABLET, FILM COATED ORAL at 09:50

## 2021-12-29 RX ADMIN — Medication 1 MG: at 22:37

## 2021-12-29 RX ADMIN — LORAZEPAM 0.5 MG: 2 INJECTION INTRAMUSCULAR; INTRAVENOUS at 14:59

## 2021-12-29 RX ADMIN — THIAMINE HCL TAB 100 MG 100 MG: 100 TAB at 14:47

## 2021-12-29 RX ADMIN — LOSARTAN POTASSIUM 50 MG: 50 TABLET, FILM COATED ORAL at 14:47

## 2021-12-29 ASSESSMENT — ENCOUNTER SYMPTOMS
ABDOMINAL PAIN: 0
SHORTNESS OF BREATH: 1
FEVER: 1

## 2021-12-29 NOTE — ED PROVIDER NOTES
History     Chief Complaint:  Chest Pain and Shortness of Breath       HPI   Kavitha Headley is a 76 year old female who presents with chest pain and shortness of breath.  Patient was recently admitted from 12/17-12/27 for acute hypoxic respiratory failure and viral URI with reactive airway disease as well as history of alcohol use disorder with associated alcohol withdrawal.  Patient notes that she was feeling well at the time of hospital discharge however developed increasing chest pain or shortness of breath yesterday which worsened throughout the day.  She eventually called EMS as she reported increasing chest pain around midnight after a coughing episode.  EMS administered 4 nitro sprays and DuoNeb as they noted oxygen saturations were in the upper 80s on room air.  Patient notes improvement in respiratory status upon ED arrival.  She notes that her shortness of breath is worse when laying down.  Additional associated symptoms include nonproductive cough.  Patient was discharged from the hospital on a short taper of steroids and received 3 days of azithromycin while hospitalized.  She notes that she has not been drinking alcohol since prior to her previous hospital admission.    ROS:  Review of Systems   Constitutional: Positive for fever.   Respiratory: Positive for shortness of breath.    Cardiovascular: Positive for chest pain. Negative for leg swelling.   Gastrointestinal: Negative for abdominal pain.   All other systems reviewed and are negative.    Allergies:  Bactrim [Sulfamethoxazole W/Trimethoprim]  Codeine  Morphine     Medications:    acamprosate (CAMPRAL) 333 MG EC tablet  acetaminophen (TYLENOL) 500 MG tablet  albuterol (PROAIR HFA/PROVENTIL HFA/VENTOLIN HFA) 108 (90 Base) MCG/ACT inhaler  aspirin 81 MG EC tablet  atenolol (TENORMIN) 25 MG tablet  citalopram (CELEXA) 20 MG tablet  diclofenac (VOLTAREN) 50 MG EC tablet  folic acid (FOLVITE) 1 MG tablet  furosemide (LASIX) 20 MG tablet  gabapentin  (NEURONTIN) 300 MG capsule  guaiFENesin-dextromethorphan (ROBITUSSIN DM) 100-10 MG/5ML syrup  ipratropium - albuterol 0.5 mg/2.5 mg/3 mL (DUONEB) 0.5-2.5 (3) MG/3ML neb solution  losartan (COZAAR) 50 MG tablet  mirtazapine (REMERON) 15 MG tablet  mometasone-formoterol (DULERA) 200-5 MCG/ACT inhaler  Multiple Vitamins-Minerals (CENTRUM SILVER) per tablet  polyethylene glycol (MIRALAX) 17 GM/Dose powder  pravastatin (PRAVACHOL) 20 MG tablet  predniSONE (DELTASONE) 10 MG tablet  QUEtiapine (SEROQUEL XR) 150 MG TB24 24 hr tablet  QUEtiapine (SEROQUEL) 50 MG tablet  senna-docusate (SENOKOT-S/PERICOLACE) 8.6-50 MG tablet  thiamine (B-1) 100 MG tablet  traZODone (DESYREL) 100 MG tablet        Past Medical History:    Past Medical History:   Diagnosis Date     Alcohol abuse      Anxiety disorder      Ascending aorta dilatation (H)      Bariatric surgery status 1996?     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)      Macrocytic anemia      Major depressive disorder, single episode, severe, without mention of psychotic behavior      Mixed hyperlipidemia      Moderate aortic stenosis 05/2014     Pelvic relaxation disorder      Personal history of urinary calculi 6/2006     Psoriasis      PVC (premature ventricular contraction)      Spinal stenosis      Stage III chronic kidney disease (H) 2005     SVT (supraventricular tachycardia) (H)      Patient Active Problem List   Diagnosis     Spinal stenosis     Chronic insomnia     CKD (chronic kidney disease) stage 3, GFR 30-59 ml/min (H)     Knee joint replacement status     Chronic pain syndrome     Bariatric surgery status     Personal history of urinary calculi      Anemia, unspecified type     Anxiety     Vitamin B12 deficiency (non anemic)     Alcohol use disorder, severe, dependence (H)     Liver disease, chronic, due to alcohol (H)     Coronary artery disease involving native coronary artery of native heart without angina pectoris     Mild ascending aorta dilatation (H)     Psoriasis - on Humira - Dr. Gauri Clark with dermatology     Mild major depression (H)     Thiamine deficiency     Herpes simplex virus infection     Cold sore     Gastroesophageal reflux disease with esophagitis - chronic due to alcohol     Hyponatremia     Elevated brain natriuretic peptide (BNP) level     Enlarged pulmonary artery (H)     Acute respiratory failure with hypoxia (H)     Viral URI     Reactive airway disease     Influenza A     Hypoxia        Past Surgical History:    Past Surgical History:   Procedure Laterality Date     APPENDECTOMY  3/2004    incidental     ARTHRODESIS TOE(S) Right 1/31/2020    Procedure: RIGHT FIRST METATARSAL PHALANGEAL JOINT ARTHRODESIS;  Surgeon: Steven Reyes MD;  Location:  OR      MEDIASTINOSCOPY W OR WO BIOPSY  2/2008    Videomediastinoscopy and, for mediastinal adenopathy -reactive lymphoid hyperplasia     CARPAL TUNNEL RELEASE RT/LT  10/2010    Carpometacarpal excisional arthroplasty with a fascial autograft and APL suspension sling (13103). 2. Left thumb metacarpophalangeal joint fusion with autologous bone graft (73338). 3. Left endoscopic carpal tunnel release      CHOLECYSTECTOMY, LAPOROSCOPIC  11/2010    Cholecystectomy, Laparoscopic     COLONOSCOPY N/A 9/8/2016    Procedure: COMBINED COLONOSCOPY, SINGLE OR MULTIPLE BIOPSY/POLYPECTOMY BY BIOPSY;  Surgeon: Moe Barlow MD;  Location:  GI     [facility]  [facility] enterocele repair, lysis of adhesions, placement of retropubic mid urethral sling, cystoscopy     CYSTOSCOPY, LITHOTRIPSY, COMBINED  6/2006    Left extracorporeal shock wave  lithotripsy, cystoscopy, left ureteral stent placement.     CYSTOSCOPY, REMOVE STENT(S), COMBINED  7/2006    Cystoscopy, removal of left ureteral stent, retrograde pyelography, flexible and rigid ureteroscopy and holmium laser lithotripsy, basket removal of stone fragments, ureteral stent placement.      ENDOSCOPIC ULTRASOUND UPPER GASTROINTESTINAL TRACT (GI) N/A 6/12/2017    Procedure: ENDOSCOPIC ULTRASOUND, ESOPHAGOSCOPY / UPPER GASTROINTESTINAL TRACT (GI);  ENDOSCOPIC ULTRASOUND, ESOPHAGOSCOPY / UPPER GASTROINTESTINAL TRACT (GI);  Surgeon: Parth Graham MD;  Location:  GI     HERNIA REPAIR  4/2012    bilateral augmentation mastopexy, ventral hernia repair, and medial thigh liposuction on 04/06/2012.      HYSTERECTOMY VAGINAL, BILATERAL SALPINGO-OOPHERECTOMY, COMBINED  1998    due to myoma and bleeding     JOINT REPLACEMENT, HIP RT/LT  4/2004    right total hip arthroplasty     LAPAROTOMY, LYSIS ADHESIONS, COMBINED  3/2004    lysis adhesions, ventral hernia repair, appendectomy incidentally     LYMPH NODE BIOPSY  4/2008    right axillary, reactive follicular and paracortical hyperplasia.     MAMMOPLASTY AUGMENTATION BILATERAL  4/2012     REPAIR HAMMER TOE Right 1/31/2020    Procedure: WITH SECOND AND THIRD CLAW TOE RECONSTRUCTION;  Surgeon: Steven Reyes MD;  Location:  OR     REVISE RECONSTRUCTED BREAST  6/7/2012    Left breast capsulotomy.      ZZC GASTRIC BYPASS,OBESE<100CM ARIANNA-EN-Y  1996     [facility] REPAIR OF RECTOCELE  3/2012     [facility] TOTAL KNEE ARTHROPLASTY  12/2005    left         Family History:    family history includes Cancer in her father; Substance Abuse in her father.    Social History:   reports that she has never smoked. She has never used smokeless tobacco.  History of alcohol abuse, reports no alcohol consumption for the last 12 days; she reports that she does not use drugs.  PCP: Nayeli Castorena     Physical Exam     Patient Vitals for the past 24 hrs:   BP Temp Temp src Pulse  "Resp SpO2 Height   12/29/21 0530 138/88 -- -- 83 (!) 36 96 % --   12/29/21 0500 122/60 -- -- 80 25 94 % --   12/29/21 0430 124/54 -- -- 89 20 93 % --   12/29/21 0400 136/79 (!) 100.9  F (38.3  C) Tympanic 95 22 98 % 1.626 m (5' 4\")        Physical Exam  General: Alert and cooperative with exam. Patient in mild to moderate distress. Normal mentation.  Anxious appearance, nontoxic appearance  Head:  Scalp is NC/AT  Eyes:  No scleral icterus, PERRL  ENT:  The external nose and ears are normal.  Neck:  Normal range of motion without rigidity.  CV:  Regular rate and rhythm  Resp:  Breath sounds are clear bilaterally.  Nasal cannula present    Non-labored, no retractions or accessory muscle use  GI:  Abdomen is soft, no distension, no tenderness. No peritoneal signs.  Overweight  MS:  No lower extremity edema   Skin:  Warm and dry, No rash or lesions noted.  Neuro: Oriented x 3. No gross motor deficits.        Emergency Department Course   ECG:  ECG taken at 0353, ECG read at 0400  Normal sinus rhythm. Left axis deviation. Low voltage QRS. Cannot rule out anterior infarct, age undetermined. Abnormal ECG.  No significant change compared to previous ECG on 12/17/21.  Rate 94 bpm. MD interval 184. QRS duration 62. QT/QTc 358/447. P-R-T axes 19   -37   63.    Imaging:  XR Chest Port 1 View   Final Result   IMPRESSION: Cardiac silhouette is enlarged. Calcified aortic arch. Mild prominence of the central pulmonary vascularity. No definite airspace consolidation. Vague opacification in the retrocardiac region may be attributable to overlying soft tissue    attenuation and patient's breast implant. No visible pneumothorax.         Report per radiology    Laboratory:  Labs Ordered and Resulted from Time of ED Arrival to Time of ED Departure   COMPREHENSIVE METABOLIC PANEL - Abnormal       Result Value    Sodium 133      Potassium 3.9      Chloride 100      Carbon Dioxide (CO2) 27      Anion Gap 6      Urea Nitrogen 21      " Creatinine 1.05 (*)     Calcium 8.5      Glucose 142 (*)     Alkaline Phosphatase 66      AST 17      ALT 25      Protein Total 7.5      Albumin 3.3 (*)     Bilirubin Total 0.4      GFR Estimate 54 (*)    INFLUENZA A/B & SARS-COV2 PCR MULTIPLEX - Abnormal    Influenza A PCR Positive (*)     Influenza B PCR Negative      SARS CoV2 PCR Negative     LACTIC ACID WHOLE BLOOD - Abnormal    Lactic Acid 2.6 (*)    CBC WITH PLATELETS AND DIFFERENTIAL - Abnormal    WBC Count 23.2 (*)     RBC Count 3.32 (*)     Hemoglobin 10.0 (*)     Hematocrit 31.8 (*)     MCV 96      MCH 30.1      MCHC 31.4 (*)     RDW 14.8      Platelet Count 490 (*)     % Neutrophils 91      % Lymphocytes 3      % Monocytes 5      % Eosinophils 0      % Basophils 0      % Immature Granulocytes 1      NRBCs per 100 WBC 0      Absolute Neutrophils 21.0 (*)     Absolute Lymphocytes 0.7 (*)     Absolute Monocytes 1.2      Absolute Eosinophils 0.0      Absolute Basophils 0.1      Absolute Immature Granulocytes 0.3      Absolute NRBCs 0.0     PROCALCITONIN - Abnormal    Procalcitonin 0.05 (*)    TROPONIN I - Normal    Troponin I High Sensitivity 13     ETHYL ALCOHOL LEVEL - Normal    Alcohol ethyl <0.01     BLOOD CULTURE   BLOOD CULTURE        Emergency Department Course:      Reviewed:  I reviewed nursing notes, vitals and past medical history    Interventions:  Medications   0.9% sodium chloride BOLUS (1,000 mLs Intravenous New Bag 12/29/21 2552)   oseltamivir (TAMIFLU) capsule 30 mg (has no administration in time range)   LORazepam (ATIVAN) injection 0.5 mg (0.5 mg Intravenous Given 12/29/21 6594)        Disposition:  The patient was admitted to the observation unit under the care of Dr. Mckeon.     Impression & Plan      Covid-19  Kavitha Headley was evaluated during a global COVID-19 pandemic, which necessitated consideration that the patient might be at risk for infection with the SARS-CoV-2 virus that causes COVID-19.   Applicable protocols for  evaluation were followed during the patient's care.   COVID-19 was considered as part of the patient's evaluation. The plan for testing is:  a test was obtained during this visit.    Medical Decision Making:  Patient is a 76-year-old female who presents with chest pain and shortness of breath; noted to be mildly hypoxic by EMS and received nitro and DuoNeb prior to ED arrival.  Patient's medical history and records were reviewed.  Initial consideration for, but not limited to, ACS/MI, esophageal spasm/GERD, MSK pain, infectious process, alcohol intoxication/withdrawal (history of alcohol abuse), metabolic disturbance, electrolyte abnormality, arrhythmia, among others.  Labs, EKG, and imaging was obtained.  EKG demonstrates normal sinus rhythm without evidence of acute ischemia, infarction, or significant arrhythmia.  Patient's description of chest pain seems most consistent with chest wall pain.  Chest x-ray without significant acute findings; see results above.  Troponin was negative and at this time there is low suspicion for cardiac etiology.  Patient's oxygen saturations remained in the low normal range on room air and she was provided 2 L nasal cannula for comfort with noted improvement in her oxygen saturations.  Testing was notable for elevated lactic acid (2.6; infection versus more likely dehydration or hypoxia; favor hypoxia), elevated white count (23.2; infection versus more likely elevation secondary to current steroid use), and undetectable alcohol level. Patient denies recent alcohol usage though was noted to be tremulous on arrival (possibly secondary to DuoNeb); patient should be monitored for signs of withdrawal.  She was provided Ativan for anxiety with noted improvement.  Influenza testing returned positive.  Provided renally adjusted Tamiflu and IV fluid.  I will defer antibiotics at this time; procalcitonin is very low.  Patient will be admitted to observation with the hospitalist service for  further evaluation and care.  Presentation consistent with mild hypoxia secondary to influenza A infection    Diagnosis:    ICD-10-CM    1. Influenza A  J10.1    2. Hypoxia  R09.02    3. SOB (shortness of breath)  R06.02    4. Chest pain, unspecified type  R07.9              Juan Antonio Rosales,   12/29/21 0644

## 2021-12-29 NOTE — ED NOTES
"[facility]  ED Nurse Handoff Report    ED Chief complaint: Chest Pain and Shortness of Breath      ED Diagnosis:   Final diagnoses:   None       Code Status: Full Code------according to recent admission    Allergies:   Allergies   Allergen Reactions     Bactrim [Sulfamethoxazole W/Trimethoprim] Hives     Codeine Itching     NAUSEA     Morphine Itching     NAUSEA       Patient Story: recent hospitalization and discharged 12/27 for SOB and bronchitis. Sudden onset of chest pain with SOB started around 2330. Given NTG SL X 4 that brought pain down from 10/10 to 4/10. Initial O2 sats upper 80s and increased to mid 90s with duoneb.  Focused Assessment:  trialed on room air and pt c/o increased SOB with chest pain returning. Was anxious and tachypnic. Placed on O2 at 2 liters via NC and given ativan IV and reports breathing better. Has productive frequent cough.    Treatments and/or interventions provided: IV, labs, ekg, cxr, ativan IV, O2 via NC at 2 liters  Patient's response to treatments and/or interventions: decreased SOB    To be done/followed up on inpatient unit:  monitor    Does this patient have any cognitive concerns?: none    Activity level - Baseline/Home:  Independent  Activity Level - Current:   Stand with Assist    Patient's Preferred language: English   Needed?: No    Isolation: None and Droplet  Infection: Not Applicable  Influenza  Patient tested for COVID 19 prior to admission: YES  Bariatric?: No    Vital Signs:   Vitals:    12/29/21 0400 12/29/21 0430 12/29/21 0500 12/29/21 0530   BP: 136/79 124/54 122/60 138/88   Pulse: 95 89 80 83   Resp: 22 20 25 (!) 36   Temp: (!) 100.9  F (38.3  C)      TempSrc: Tympanic      SpO2: 98% 93% 94% 96%   Height: 1.626 m (5' 4\")          Cardiac Rhythm:Cardiac Rhythm: Normal sinus rhythm    Was the PSS-3 completed:   Yes  What interventions are required if any?               Family Comments: none present  OBS brochure/video " discussed/provided to patient/family: No              Name of person given brochure if not patient:               Relationship to patient:     For the majority of the shift this patient's behavior was Green.   Behavioral interventions performed were .    ED NURSE PHONE NUMBER: [phone]

## 2021-12-29 NOTE — PHARMACY-ADMISSION MEDICATION HISTORY
Pharmacy Medication History  Admission medication history interview status for the 12/29/2021  admission is complete. See EPIC admission navigator for prior to admission medications     Location of Interview: Phone  Medication history sources: Patient and Surescripts    Significant changes made to the medication list:  Added: hydroxyzine, methocarbamol    In the past week, patient estimated taking medication this percent of the time: 50-90% due to illness    Additional medication history information:   none    Medication reconciliation completed by provider prior to medication history? No    Time spent in this activity: 15 mins    Prior to Admission medications    Medication Sig Last Dose Taking? Auth Provider   acamprosate (CAMPRAL) 333 MG EC tablet Take 2 tablets (666 mg) by mouth 3 times daily 12/27/2021 at PM Yes Jin Ackerman MD   acetaminophen (TYLENOL) 500 MG tablet Take 1,000 mg by mouth 2 times daily as needed for pain 12/28/2021 at PM Yes Unknown, Entered By History   albuterol (PROAIR HFA/PROVENTIL HFA/VENTOLIN HFA) 108 (90 Base) MCG/ACT inhaler Inhale 2 puffs into the lungs every 6 hours as needed for wheezing  at PRN Yes Butch Duffy MD   aspirin 81 MG EC tablet Take 81 mg by mouth daily 12/28/2021 at AM Yes Unknown, Entered By History   atenolol (TENORMIN) 25 MG tablet Take 1 tablet (25 mg) by mouth daily 12/27/2021 at AM Yes Herman Ugarte MD   citalopram (CELEXA) 20 MG tablet Take 1 tablet (20 mg) by mouth daily 12/27/2021 at AM Yes Nayeli Castorena PA-C   diclofenac (VOLTAREN) 50 MG EC tablet Take 1 tablet (50 mg) by mouth 2 times daily 12/27/2021 at PM Yes Jin Ackerman MD   folic acid (FOLVITE) 1 MG tablet Take 1 mg by mouth daily 12/27/2021 at AM Yes Unknown, Entered By History   furosemide (LASIX) 20 MG tablet TAKE 1 TABLET DAILY 12/27/2021 at AM Yes Herman Ugarte MD   gabapentin (NEURONTIN) 300 MG capsule Take 1 capsule (300 mg) by mouth 3 times daily  12/27/2021 at PM Yes Nayeli Castorena PA-C   guaiFENesin-dextromethorphan (ROBITUSSIN DM) 100-10 MG/5ML syrup Take 10 mLs by mouth every 4 hours as needed for cough  Yes Unknown, Entered By History   hydrOXYzine (ATARAX) 25 MG tablet Take 25 mg by mouth every 6 hours as needed for anxiety  Yes Unknown, Entered By History   ipratropium - albuterol 0.5 mg/2.5 mg/3 mL (DUONEB) 0.5-2.5 (3) MG/3ML neb solution Take 1 vial (3 mLs) by nebulization 4 times daily  Yes Nicho Marquez MD   losartan (COZAAR) 50 MG tablet Take 1 tablet (50 mg) by mouth daily 12/28/2021 at AM Yes Butch Duffy MD   methocarbamol (ROBAXIN) 500 MG tablet Take 500 mg by mouth 2 times daily 12/28/2021 at PM Yes Unknown, Entered By History   mirtazapine (REMERON) 15 MG tablet Take 1 tablet (15 mg) by mouth At Bedtime 12/27/2021 at HS Yes Jin Ackerman MD   mometasone-formoterol (DULERA) 200-5 MCG/ACT inhaler Inhale 2 puffs into the lungs 2 times daily 12/27/2021 at PM Yes Butch Duffy MD   Multiple Vitamins-Minerals (CENTRUM SILVER) per tablet Take 1 tablet by mouth daily 12/27/2021 at AM Yes Reported, Patient   polyethylene glycol (MIRALAX) 17 GM/Dose powder Take 17 g by mouth 2 times daily as needed for constipation  at PRN Yes Unknown, Entered By History   pravastatin (PRAVACHOL) 20 MG tablet Take 1 tablet (20 mg) by mouth daily 12/27/2021 at AM Yes Herman Ugarte MD   predniSONE (DELTASONE) 10 MG tablet Take 2 tablets (20 mg) by mouth daily for 2 days, THEN 1 tablet (10 mg) daily for 2 days. 12/28/2021 at AM Yes Butch Duffy MD   QUEtiapine (SEROQUEL XR) 150 MG TB24 24 hr tablet Take 1 tablet (150 mg) by mouth At Bedtime 12/27/2021 at HS Yes Jin Ackerman MD   QUEtiapine (SEROQUEL) 50 MG tablet Take 1 tablet (50 mg) by mouth nightly as needed (Anxiety, insomnia, hallucinations)  at PRN Yes Jin Ackerman MD   senna-docusate (SENOKOT-S/PERICOLACE) 8.6-50 MG tablet Take 1 tablet by mouth 2  times daily 12/27/2021 at PM Yes Misael Robles MD   thiamine (B-1) 100 MG tablet Take 1 tablet (100 mg) by mouth daily for 7 doses 12/27/2021 at AM Yes Nicho Marquez MD   traZODone (DESYREL) 100 MG tablet TAKE 1 TABLET NIGHTLY AS NEEDED FOR SLEEP 12/28/2021 at PM Yes Nayeli Castorena, PA-C       The information provided in this note is only as accurate as the sources available at the time of update(s)     Michelle Welsh, LedaD

## 2021-12-29 NOTE — H&P
76 year old with 10 day admission recently for viral URI, alcohol disorder with electrolyte abnormality, discharged on prednisone readmitted with chest pain and sob and fever and finding of influenza A with mild elevation of lactic acidosis, elevation of wbc (on prednisone).  Mild transient hypoxemia being admitted under observation status.      Moe Mckeon MD    Dictation # [id number]

## 2021-12-29 NOTE — PROGRESS NOTES
Clinic Care Coordination Contact    Background: Care Coordination referral placed from [facility] discharge report for reason of patient meeting criteria for a TCM outreach call by [facility] team.    Assessment: Upon chart review, [facility] Team member will cancel/close the referral for TCM outreach due to reason below:    Patient has presented to Emergency Department or has been readmitted to hospital    Plan: Care Coordination referral for TCM outreach canceled.    Myranda Ureña MA  [facility], [facility]

## 2021-12-29 NOTE — PLAN OF CARE
RECEIVING UNIT ED HANDOFF REVIEW    ED Nurse Handoff Report was reviewed by: Coy Lauren RN on December 29, 2021 at 7:56 AM

## 2021-12-29 NOTE — ED NOTES
Bed: ED28  Expected date: 12/29/21  Expected time: 3:40 AM  Means of arrival: Ambulance  Comments:   76F chest pain

## 2021-12-29 NOTE — ED TRIAGE NOTES
Recent discharge from [facility] 12/27 for treatment of bronchitis and sob. Approximately 2330 last night while in reclyner, patient expeerienced crushing chest pain with shortness of breath. Pain was midsternal, non radiatiing, 10/10 pain.  Denied nausea and vomiting. EMS gave 4 SL NTG sprays relieving pain to 4/10. Duoneb increased O2 sats from upper 80s to mid 90s, relieving effort.

## 2021-12-29 NOTE — H&P
Admitted: 12/29/2021    PRIMARY CARE PHYSICIAN:  Nayeli Castorena PA-C    CHIEF COMPLAINT:  Shortness of breath.    HISTORY OF PRESENT ILLNESS:  Linda Headley is a 76-year-old female who was recently admitted to [facility] from 12/17 to 12/27 with acute hypoxic respiratory failure, viral URI with reactive airway disease and underlying moderate to severe aortic stenosis.  She also had metabolic derangement including hypokalemia, hypomagnesemia, hyponatremia.  She has a history of alcohol use and had suffered from alcohol withdrawal.  She also has chronic pain syndrome in addition to depression and anxiety.  During that admission, she was COVID negative, influenza negative.  She was treated with Zithromax.  She was seen by Pulmonary consultants.  She was discharged on room air.  Her hypokalemia and hypomagnesemia were thought to be due to furosemide use and her hyponatremia was thought to be multifactorial with chronic alcohol contributing to that.  She consumes 6 alcoholic drinks per day and presented tremulous, anxious as well.    The patient comes into [facility] via EMS due to shortness of breath.  The patient earlier in the morning complained of shortness of breath and contacted EMS.  They had noted her oxygen saturation to be in the mid 80s.  She may have also said she had some chest pain.  She was given DuoNebs and 4 nitroglycerin.  She was brought to [facility] for further assessment.    In the Emergency Department, the patient had a fever of 100.9.  Lung exam was clear.  Chest x-ray showed no acute finding.  Her white count was elevated at 23,000, noting she is on steroids from her recent discharge.  Her hemoglobin is 10, platelets of 490.  Her alcohol level was negative.  Blood cultures were obtained.  Chest x-ray showed no acute infiltrates.  She did come back positive for influenza.  She was given Tamiflu.  Her oxygen saturations are now 92% on room air.   Procalcitonin level is being checked.  The patient is being admitted under observation status for influenza as new diagnosis with fever, leukocytosis that likely may be from steroids as opposed to pneumonia, initial hypoxemia that seemed to have resolved with nebs, being admitted for further medical stabilization and hopeful discharge within the next 24 hours.    PAST MEDICAL HISTORY:     1.  Alcohol abuse.  2.  Anxiety disorder.  3.  Ascending aortic dilatation.  4.  Bariatric surgery.  5.  Hypertension.  6.  Chronic insomnia.  7.  Chronic pain syndrome.  8.  Coronary artery disease.  9.  GERD.  10.  Hip joint replacement.  11.  Kidney stones.  12.  Knee joint replacement.  13.  Liver disease due to alcohol.   14.  Macrocytic anemia.  15.  Major depression.  16.  Hyperlipidemia.  17.  Moderate aortic stenosis.  18.  Pelvic relaxation disorder.  19.  History of kidney stones.  20.  Psoriasis.  21.  Premature ventricular contractions.  22.  Spinal stenosis.  23.  Stage III chronic kidney disease.  24.  Supraventricular tachycardia.   25.  Herpes simplex virus.  26.  Thiamine deficiency.  27.  Vitamin B12 deficiency,  28.  GERD.    PAST SURGICAL HISTORY:     1.  Appendectomy.  2.  Arthrodesis, right first metatarsal.  3.  Video mediastinoscopy for lymphoid hyperplasia.  4.  Carpal tunnel release bilaterally.  5.  Cholecystectomy.  6.  Colonoscopy.   7.  Cystocele repair.  8.  Combined lithotripsy and cystoscopy.  9.  Hernia repair.  10.  Vaginal hysterectomy.  11.  Bilateral salpingo-oophorectomy.  12.  Bilateral hip replacement.  13.  Lysis of adhesions combined with laparotomy.  14.  Lymph node biopsy.  15.  Mammoplasty augmentation, bilateral.  16.  Hammertoe repair.  17.  Left knee arthroplasty.    FAMILY HISTORY:  Includes cancer in father, as well as substance abuse.    SOCIAL HISTORY:  No tobacco.  Positive for alcohol.    ALLERGIES:  INCLUDE BACTRIM, CODEINE, MORPHINE.    CURRENT MEDICATION LIST:  Includes    1.  Albuterol.  2.  DuoNebs.  3.  Dulera inhaler.  4.  Prednisone.  5.  Losartan.  6.  Thiamine.  7.  Acamprosate.  8.  Acetaminophen.  9.  Aspirin.  10.  Atenolol.  11.  Celexa.  12.  Diclofenac.  13.  Folic acid.  14.  Furosemide.  15.  Gabapentin.  16.  Robitussin with dextromethorphan.  17.  Mirtazapine.  18.  Centrum Silver.  19.  MiraLax.  20.  Pravastatin.  21.  Quetiapine.  22.  Senna.  23.  Trazodone.    REVIEW OF SYSTEMS:  Ten points reviewed and otherwise unremarkable and as dictated in history of present illness.    PHYSICAL EXAMINATION:    VITAL SIGNS:  Temperature 100.8, heart rate 83, respirations initially 36, now 18, blood pressure 138/88, sats are 92% on room air.  GENERAL:  The patient is alert and oriented x3, nontoxic-appearing.  HEENT:  Normocephalic, atraumatic.  Pupils equal.  Mucous membranes are moist.  NECK:  JVP is not elevated.  PULMONARY:  Lungs are clear without any wheezing or rales.  GASTROINTESTINAL:  Abdomen is soft, nontender, obese.  Normoactive bowel sounds.  MUSCULOSKELETAL:  No edema.  NEUROLOGIC:  She is oriented x3.  Cranial nerves grossly intact.  SKIN:  Warm, dry, well perfused.  PSYCHIATRIC:  Mood and affect stable.    LABORATORY AND IMAGING STUDIES:  As dictated in history of present illness.    ASSESSMENT:  Kavitha Headley is 76, who was initially admitted to [facility] for 10 days, primarily due to alcohol withdrawal, metabolic derangement with electrolyte abnormalities, as well as hypoxic respiratory failure due to viral upper respiratory infection, now is readmitted with shortness of breath, fever and influenza, being admitted under observation status with borderline low oxygen saturations and mild lactic acid elevation, being admitted for further treatment under observation status.    PLAN:     1.  Influenza:  The patient, due to renal dysfunction, will be on low-dose Tamiflu to complete a 5-day course.  Elevation of her white count is likely due to  steroids, which we are awaiting a procalcitonin level.  Chest x-ray shows no infiltrates.  We will hold off on antibiotics.  She is not requiring supplemental oxygen.  2.  History of alcohol abuse:  The patient's alcohol is negative.  Currently, there is no evidence of any alcohol withdrawal symptoms.  Her LFTs are also unremarkable.  We will need to monitor for any signs of withdrawal.  3.  Chest pain:  She was noted to have some possible chest pain.  She received nitroglycerin.  Her troponin is negative.  EKG shows no acute ischemic changes.  She has underlying moderate to severe aortic stenosis.  She had a left heart catheterization in  that showed mild coronary artery disease.  4.  Depression, anxiety:  The patient will be continued on her Celexa, Remeron, Seroquel and trazodone once verified.  5.  Hypertension:  We will continue the patient on her furosemide, losartan, atenolol once verified.  6.  Hyperlipidemia:  Continue the patient on Pravachol, but could hold given her observation status.  7.  Deep venous thrombosis prophylaxis:  The patient with compression boots.    CODE STATUS:  FULL.    DISPOSITION:  Observation status.    Moe Mckeon MD        D: 2021   T: 2021   MT: TAYLORMT    Name:     DARWIN JASSO  MRN:      [id number]        Account:     [id number]   :      1945           Admitted:    2021       Document: [id number]    cc:  Nayeli Castorena PA-C

## 2021-12-29 NOTE — PROGRESS NOTES
[facility]    Hospitalist Progress Note      Assessment & Plan   Kavitha Headley is a 76 year old female who was initially admitted to [facility] for 10 days, primarily due to alcohol withdrawal, metabolic derangement with electrolyte abnormalities, as well as hypoxic respiratory failure due to viral upper respiratory infection, now is readmitted with shortness of breath, fever and influenza, being admitted under observation status with borderline low oxygen saturations and mild lactic acid elevation.    Influenza  Elevated lactic acid, improved  CXR shows no infiltrates. procal 0.05. LA 2.6 on admit, likely secondary to poor intake. Received 1L IVF in ED, LA improved to 1.5  - renal dose tamiflu BID x5 days  - leukocytosis likely secondary to recent steroids  - no plans for abx  - wearing oxygen for comfort, O2 sat 92% on room air  - LR at 100ml/hr until 9am 12/30  - prn cough meds, tylenol  - duonebs q4h while awake, continue HEPA filter for droplet precautions.  - encourage IS  - PT Eval    History of alcohol abuse  Alcohol is negative. Currently, there is no evidence of any alcohol withdrawal symptoms. Her LFTs are also unremarkable. We will need to monitor for any signs of withdrawal.    Chest pain, suspect musculoskeletal  Moderate-severe valvular stenosis  She was noted to have some possible chest pain.  She received nitroglycerin.  Her troponin is negative.  EKG shows no acute ischemic changes.  She has underlying moderate to severe aortic stenosis.  She had a left heart catheterization in 2015 that showed mild coronary artery disease. Troponins 13-->17-->16  Echo 10/20/21: EF 60-65%, LV filling pressures, moderate to severe valvular aortic stenosis  - check limited echo    Depression, anxiety  Continue PTA Celexa, Remeron, Seroquel and trazodone  - Continue PTA hydroxyzine, also has ativan IV available    Hypertension  Resume PTA losartan, atenolol  - consideration  for resumption of lasix once taking adequate PO    Hyperlipidemia  PTA pravachol, resume on discharge given obs status     CKD Stage 2/3  Cr near 0.8-1 at baseline. Cr 1.05 on admit (similar to discharge on 12/26)  - Daily BMP    Clinically Significant Risk Factors Present on Admission              # Platelet Defect: home medication list includes an antiplatelet medication   # Obesity: last Body mass index is 32.01 kg/m .        DVT Prophylaxis: Pneumatic Compression Devices  Code Status: Full Code  Expected Discharge: 12/30/2021     Anticipated discharge location:  Awaiting care coordination huddle  Delays:     *Early Discharge Anticipated   once improving symptoms      Myranda Block, DO  Hospitalist Service  [facility]  Securely message with the Vocera Web Console (learn more here)  Text Page (7am - 6pm) via [employer] Paging/Directory      Interval History   Patient seen and examined. She is tremulous this morning. She received a neb when she was in the ambulance and felt better. She also felt a little anxious and get some ativan this morning. She still feels terrible. Aches all over and a cough. She requests cough medication to help decrease her cough. Discussed care plan with RN.  Spent 35 minutes with >50% reviewing chart, evaluating patient, discussing care plan with RN and then revisiting patient, discussing care plan with daughter and with respiratory therapy.    -Data reviewed today: I reviewed all new labs and imaging results over the last 24 hours. I personally reviewed no images or EKG's today.    Physical Exam   Temp: 98.5  F (36.9  C) Temp src: Oral BP: (!) 154/87 Pulse: 88   Resp: 24 SpO2: 94 % O2 Device: None (Room air) Oxygen Delivery: 2 LPM  There were no vitals filed for this visit.  Vital Signs with Ranges  Temp:  [98.5  F (36.9  C)-100.9  F (38.3  C)] 98.5  F (36.9  C)  Pulse:  [80-95] 88  Resp:  [20-36] 24  BP: (122-154)/(54-88) 154/87  SpO2:  [93 %-98 %] 94 %  No  intake/output data recorded.    Constitutional: Awake, alert, cooperative, tremulous, uncomfortable  Respiratory: diffuse expiratory wheeze, occasional crackles  Cardiovascular: Regular rate and rhythm, normal S1 and S2, and no murmur noted  GI: Normal bowel sounds, soft, non-distended, non-tender  Skin/Integumen: No rashes, no cyanosis, no edema  Other: Mild anxiety    Medications     lactated ringers 100 mL/hr at 12/29/21 0950       acamprosate  666 mg Oral TID     atenolol  25 mg Oral Daily     citalopram  20 mg Oral Daily     diclofenac  50 mg Oral BID     folic acid  1 mg Oral Daily     gabapentin  300 mg Oral TID     ipratropium - albuterol 0.5 mg/2.5 mg/3 mL  3 mL Nebulization Q4H While awake     losartan  50 mg Oral Daily     methocarbamol  500 mg Oral BID     mirtazapine  15 mg Oral At Bedtime     oseltamivir  30 mg Oral BID     predniSONE  10 mg Oral Daily     QUEtiapine  150 mg Oral At Bedtime     senna-docusate  1 tablet Oral BID     thiamine  100 mg Oral Daily       Data   Recent Labs   Lab 12/29/21  0439 12/26/21  1132 12/23/21  0741   WBC 23.2*  --   --    HGB 10.0*  --   --    MCV 96  --   --    * 428 338    131* 131*   POTASSIUM 3.9 4.6 4.0   CHLORIDE 100 97 95   CO2 27 29 33*   BUN 21 24 30   CR 1.05* 1.04 1.11*   ANIONGAP 6 5 3   BIRGIT 8.5 8.9 9.1   * 131* 125*   ALBUMIN 3.3*  --   --    PROTTOTAL 7.5  --   --    BILITOTAL 0.4  --   --    ALKPHOS 66  --   --    ALT 25  --   --    AST 17  --   --        Recent Results (from the past 24 hour(s))   XR Chest Port 1 View    Narrative    EXAM: XR CHEST PORT 1 VIEW  LOCATION: [facility]  DATE/TIME: 12/29/2021 5:32 AM    INDICATION: SOB, fever  COMPARISON: CT from 12/17/2021.      Impression    IMPRESSION: Cardiac silhouette is enlarged. Calcified aortic arch. Mild prominence of the central pulmonary vascularity. No definite airspace consolidation. Vague opacification in the retrocardiac region may be  attributable to overlying soft tissue   attenuation and patient's breast implant. No visible pneumothorax.   Echocardiogram Limited   Result Value    LVEF  60-65%    [facility]    [id number]  [id number]  [id number]  [id number]^KIKI^ROMARIO^ROBERTA     [facility]  Echocardiography Laboratory  [address] [place] [address]     Name: DARWIN JASSO  MRN: [id number]  : 1945  Study Date: 2021 02:57 PM  Age: 76 yrs  Gender: Female  Patient Location: [facility]  Reason For Study: Chest Pain  Ordering Physician: ROMARIO SWANSON  Referring Physician: Nayeli Castorena  Performed By: Nishant Vincent     BSA: 1.9 m2  Height: 64 in  Weight: 186 lb  HR: 85  BP: 141/70 mmHg  ______________________________________________________________________________  Procedure  Limited Portable Echo Adult. Optison (NDC #[id number]) given intravenously.  ______________________________________________________________________________  Interpretation Summary     The left ventricle is normal in size.  The visual ejection fraction is 60-65%.  No regional wall motion abnormalities noted.  The right ventricle is moderately dilated.  Mildly decreased right ventricular systolic function  Moderate (46-55mmHg) pulmonary hypertension is present.  Aortic valve gradient not assessed.  ______________________________________________________________________________  Left Ventricle  The left ventricle is normal in size. There is normal left ventricular wall  thickness. The visual ejection fraction is 60-65%. No regional wall motion  abnormalities noted.     Right Ventricle  The right ventricle is moderately dilated. Mildly decreased right ventricular  systolic function.     Mitral Valve  There is trace mitral regurgitation. The mean mitral valve gradient is 7.8  mmHg. There is mild to moderate mitral stenosis.     Tricuspid Valve  The right ventricular systolic pressure is approximated at 45.3 mmHg plus the  right atrial  pressure. Moderate (46-55mmHg) pulmonary hypertension is present.     Vessels  The ascending aorta is Mildly dilated.  ______________________________________________________________________________  MMode/2D Measurements & Calculations     IVSd: 1.1 cm  LVIDd: 4.6 cm  LVIDs: 2.8 cm  LVPWd: 0.84 cm  FS: 40.5 %  LV mass(C)d: 152.2 grams  LV mass(C)dI: 80.3 grams/m2  LA dimension: 4.3 cm  asc Aorta Diam: 4.1 cm  RWT: 0.36     Doppler Measurements & Calculations  MV max P.1 mmHg  MV mean P.8 mmHg  MV V2 VTI: 61.7 cm  TR max hermes: 336.6 cm/sec  TR max P.3 mmHg     ______________________________________________________________________________  Report approved by: Felipe Squires 2021 04:04 PM

## 2021-12-29 NOTE — PROGRESS NOTES
Care Transitions SW note  At the time of patient's discharge on 12/27, she was discharged with orders for home care.  Today writer was able to arranged for home care services to start on January 4th thru [facility] thru their [place] branch [phone]. Orders with H&P, discharge summary, orders and AVS to [phone]  Writer has noted patient has been re-admitted to short stay.  Will update the Care Transition Team and [facility] of patient's current hospitalization.

## 2021-12-29 NOTE — ED NOTES
While on room air. Pt. C/o feeling SOB, is anxious and hyperventilating. Oxygen sats were 92%. O2 turned back on at 2 liters via NC. Now reports chest pain worse.

## 2021-12-30 LAB
ANION GAP SERPL CALCULATED.3IONS-SCNC: 4 MMOL/L (ref 3–14)
BUN SERPL-MCNC: 17 MG/DL (ref 7–30)
CALCIUM SERPL-MCNC: 8.6 MG/DL (ref 8.5–10.1)
CHLORIDE BLD-SCNC: 102 MMOL/L (ref 94–109)
CO2 SERPL-SCNC: 28 MMOL/L (ref 20–32)
CREAT SERPL-MCNC: 0.94 MG/DL (ref 0.52–1.04)
ERYTHROCYTE [DISTWIDTH] IN BLOOD BY AUTOMATED COUNT: 15.3 % (ref 10–15)
GFR SERPL CREATININE-BSD FRML MDRD: 62 ML/MIN/1.73M2
GLUCOSE BLD-MCNC: 88 MG/DL (ref 70–99)
HCT VFR BLD AUTO: 26.3 % (ref 35–47)
HGB BLD-MCNC: 8.2 G/DL (ref 11.7–15.7)
MAGNESIUM SERPL-MCNC: 2 MG/DL (ref 1.6–2.3)
MCH RBC QN AUTO: 29.9 PG (ref 26.5–33)
MCHC RBC AUTO-ENTMCNC: 31.2 G/DL (ref 31.5–36.5)
MCV RBC AUTO: 96 FL (ref 78–100)
PHOSPHATE SERPL-MCNC: 3.6 MG/DL (ref 2.5–4.5)
PLATELET # BLD AUTO: 360 10E3/UL (ref 150–450)
POTASSIUM BLD-SCNC: 4.2 MMOL/L (ref 3.4–5.3)
RBC # BLD AUTO: 2.74 10E6/UL (ref 3.8–5.2)
SODIUM SERPL-SCNC: 134 MMOL/L (ref 133–144)
WBC # BLD AUTO: 7.1 10E3/UL (ref 4–11)

## 2021-12-30 PROCEDURE — 85027 COMPLETE CBC AUTOMATED: CPT | Performed by: INTERNAL MEDICINE

## 2021-12-30 PROCEDURE — 999N000147 HC STATISTIC PT IP EVAL DEFER

## 2021-12-30 PROCEDURE — G0378 HOSPITAL OBSERVATION PER HR: HCPCS

## 2021-12-30 PROCEDURE — 999N000157 HC STATISTIC RCP TIME EA 10 MIN

## 2021-12-30 PROCEDURE — 84100 ASSAY OF PHOSPHORUS: CPT | Performed by: HOSPITALIST

## 2021-12-30 PROCEDURE — 250N000013 HC RX MED GY IP 250 OP 250 PS 637: Performed by: INTERNAL MEDICINE

## 2021-12-30 PROCEDURE — 82310 ASSAY OF CALCIUM: CPT | Performed by: INTERNAL MEDICINE

## 2021-12-30 PROCEDURE — 96376 TX/PRO/DX INJ SAME DRUG ADON: CPT

## 2021-12-30 PROCEDURE — 250N000011 HC RX IP 250 OP 636: Performed by: HOSPITALIST

## 2021-12-30 PROCEDURE — 250N000009 HC RX 250: Performed by: HOSPITALIST

## 2021-12-30 PROCEDURE — 83735 ASSAY OF MAGNESIUM: CPT | Performed by: HOSPITALIST

## 2021-12-30 PROCEDURE — 250N000013 HC RX MED GY IP 250 OP 250 PS 637: Performed by: HOSPITALIST

## 2021-12-30 PROCEDURE — 250N000012 HC RX MED GY IP 250 OP 636 PS 637: Performed by: HOSPITALIST

## 2021-12-30 PROCEDURE — 94640 AIRWAY INHALATION TREATMENT: CPT | Mod: 76

## 2021-12-30 PROCEDURE — 36415 COLL VENOUS BLD VENIPUNCTURE: CPT | Performed by: INTERNAL MEDICINE

## 2021-12-30 PROCEDURE — 99225 PR SUBSEQUENT OBSERVATION CARE,LEVEL II: CPT | Performed by: HOSPITALIST

## 2021-12-30 RX ORDER — FUROSEMIDE 20 MG
20 TABLET ORAL DAILY
Status: DISCONTINUED | OUTPATIENT
Start: 2021-12-31 | End: 2021-12-31 | Stop reason: HOSPADM

## 2021-12-30 RX ORDER — GUAIFENESIN 600 MG/1
600 TABLET, EXTENDED RELEASE ORAL 2 TIMES DAILY
Status: DISCONTINUED | OUTPATIENT
Start: 2021-12-30 | End: 2021-12-31 | Stop reason: HOSPADM

## 2021-12-30 RX ADMIN — GABAPENTIN 300 MG: 300 CAPSULE ORAL at 21:18

## 2021-12-30 RX ADMIN — SENNOSIDES AND DOCUSATE SODIUM 1 TABLET: 50; 8.6 TABLET ORAL at 09:52

## 2021-12-30 RX ADMIN — FOLIC ACID 1 MG: 1 TABLET ORAL at 09:52

## 2021-12-30 RX ADMIN — OSELTAMIVIR PHOSPHATE 30 MG: 30 CAPSULE ORAL at 09:51

## 2021-12-30 RX ADMIN — GABAPENTIN 300 MG: 300 CAPSULE ORAL at 09:52

## 2021-12-30 RX ADMIN — LOSARTAN POTASSIUM 50 MG: 50 TABLET, FILM COATED ORAL at 09:51

## 2021-12-30 RX ADMIN — BENZONATATE 100 MG: 100 CAPSULE ORAL at 22:36

## 2021-12-30 RX ADMIN — PREDNISONE 10 MG: 5 TABLET ORAL at 09:50

## 2021-12-30 RX ADMIN — DICLOFENAC SODIUM 50 MG: 25 TABLET, DELAYED RELEASE ORAL at 21:18

## 2021-12-30 RX ADMIN — ACAMPROSATE CALCIUM 666 MG: 333 TABLET, DELAYED RELEASE ORAL at 14:55

## 2021-12-30 RX ADMIN — QUETIAPINE FUMARATE 150 MG: 150 TABLET, EXTENDED RELEASE ORAL at 22:36

## 2021-12-30 RX ADMIN — GUAIFENESIN 600 MG: 600 TABLET ORAL at 09:51

## 2021-12-30 RX ADMIN — CITALOPRAM HYDROBROMIDE 20 MG: 20 TABLET ORAL at 09:52

## 2021-12-30 RX ADMIN — ACAMPROSATE CALCIUM 666 MG: 333 TABLET, DELAYED RELEASE ORAL at 21:17

## 2021-12-30 RX ADMIN — BENZONATATE 100 MG: 100 CAPSULE ORAL at 12:23

## 2021-12-30 RX ADMIN — LORAZEPAM 0.5 MG: 2 INJECTION INTRAMUSCULAR; INTRAVENOUS at 12:23

## 2021-12-30 RX ADMIN — Medication 1 MG: at 22:36

## 2021-12-30 RX ADMIN — GUAIFENESIN 600 MG: 600 TABLET ORAL at 21:18

## 2021-12-30 RX ADMIN — LORAZEPAM 0.5 MG: 2 INJECTION INTRAMUSCULAR; INTRAVENOUS at 22:56

## 2021-12-30 RX ADMIN — METHOCARBAMOL 500 MG: 500 TABLET ORAL at 09:52

## 2021-12-30 RX ADMIN — MIRTAZAPINE 15 MG: 15 TABLET, FILM COATED ORAL at 22:36

## 2021-12-30 RX ADMIN — IPRATROPIUM BROMIDE AND ALBUTEROL SULFATE 3 ML: .5; 3 SOLUTION RESPIRATORY (INHALATION) at 08:05

## 2021-12-30 RX ADMIN — OSELTAMIVIR PHOSPHATE 30 MG: 30 CAPSULE ORAL at 21:18

## 2021-12-30 RX ADMIN — DICLOFENAC SODIUM 50 MG: 25 TABLET, DELAYED RELEASE ORAL at 09:50

## 2021-12-30 RX ADMIN — IPRATROPIUM BROMIDE AND ALBUTEROL SULFATE 3 ML: .5; 3 SOLUTION RESPIRATORY (INHALATION) at 11:42

## 2021-12-30 RX ADMIN — GABAPENTIN 300 MG: 300 CAPSULE ORAL at 14:55

## 2021-12-30 RX ADMIN — ATENOLOL 25 MG: 25 TABLET ORAL at 09:51

## 2021-12-30 RX ADMIN — THIAMINE HCL TAB 100 MG 100 MG: 100 TAB at 09:51

## 2021-12-30 RX ADMIN — TRAZODONE HYDROCHLORIDE 100 MG: 100 TABLET ORAL at 22:36

## 2021-12-30 RX ADMIN — METHOCARBAMOL 500 MG: 500 TABLET ORAL at 21:18

## 2021-12-30 RX ADMIN — ACAMPROSATE CALCIUM 666 MG: 333 TABLET, DELAYED RELEASE ORAL at 09:51

## 2021-12-30 RX ADMIN — SENNOSIDES AND DOCUSATE SODIUM 1 TABLET: 50; 8.6 TABLET ORAL at 21:18

## 2021-12-30 NOTE — PLAN OF CARE
Observation goals  PRIOR TO DISCHARGE          1. Await procal. 0.05     2. Resolution of sob, no sob noted at rest only with exertion.     3. Home safety: PT eval pending.

## 2021-12-30 NOTE — PROVIDER NOTIFICATION
MD Notification    Notified Person: MD    Notified Person Name: PATRICIA Clarke    Notification Date/Time: 12/29/2021  1907 PM  Notification Interaction: textpaged    Purpose of Notification:  Pt requesting PTA miralax and if possible suppository too.Thank you!    Orders Received:    Comments:

## 2021-12-30 NOTE — PLAN OF CARE
Alert and oriented x4,Stand by assist. 92-94% at room air.  Dyspneic on exertion.Denies pain. LR running at 100ml/hr.  PRN miralax, trazodone and melatonin administered. Q 4 hour nebs while awake. BMP, CBC, Mag and phosphorus this AM.

## 2021-12-30 NOTE — PROGRESS NOTES
SPIRITUAL HEALTH SERVICES Progress Note  [facility]    Staff request for emotional support.    Linda shared that she is feeling better, and named family as part of her support. She said she didn't have any needs and expected to discharge home by Saturday.     I offered reflective listening and emotional support, and said a blessing.    SH remains available.    Lucina Camejo  Associate

## 2021-12-30 NOTE — PROGRESS NOTES
Observation goals  PRIOR TO DISCHARGE          1. Await procal. 0.05     2. Resolution of sob, no sob noted at rest only with exertion.     3. Home safety: Yes. PT notes no skilled PT needs as pt has been independent.

## 2021-12-30 NOTE — PROGRESS NOTES
Hospitalist TARUN note:    Paged by nursing as pt requesting PTA miralax and if possible suppository, feeling constipated.  Ordered dulcolax and miralax.    ARISTEO Leong, CNP  Hospitalist TARUN    No charge.

## 2021-12-30 NOTE — PLAN OF CARE
PT: Orders received, chart reviewed, discussed with RN. Pt admitted under observation status with influenza. Pt is ambulating independently in the room and moving safely. No concerns from nursing, pt feels comfortable returning home at discharge, but does not want to leave today yet. Pt has no skilled PT needs at this time. PT to complete orders.

## 2021-12-30 NOTE — PLAN OF CARE
A&OX4, VSS on RA but placed on 2L for comfort and SOB. Up SBA. Tessalon and Robitussin given for frequent coughs. Febrile in the day, tylenol given with improvement. On scheduled nebs. Tachycardiac after neb treatment. Ativan also given for anxiety. Echo completed. IVF at 100mL/hr. Trops and LA negative now.  Regular diet with fair appetite. IS done independently. Continue to monitor.    Await procal  Yes  2. Resolution of sob  No  3. Home safety No

## 2021-12-30 NOTE — PROGRESS NOTES
[facility]    Hospitalist Progress Note      Assessment & Plan   Kavitha Headley is a 76 year old female who was initially admitted to [facility] for 10 days, primarily due to alcohol withdrawal, metabolic derangement with electrolyte abnormalities, as well as hypoxic respiratory failure due to viral upper respiratory infection, now is readmitted with shortness of breath, fever and influenza, being admitted under observation status with borderline low oxygen saturations and mild lactic acid elevation.    Influenza  Elevated lactic acid, improved  CXR shows no infiltrates. procal 0.05. LA 2.6 on admit, likely secondary to poor intake. Received 1L IVF in ED, LA improved to 1.5  - renal dose tamiflu BID x5 days  - leukocytosis likely secondary to recent steroids (resolved after IVF on 12/30)  - no plans for abx  - wearing oxygen for comfort, O2 sat 92-97% on room air  - prn cough meds, tylenol  - mucinex 600mg BID  - duonebs q4h while awake, continue HEPA filter for droplet precautions.  - encourage IS  - complete prednisone taper, last dose 10mg on 12/31  - PT Eval recommends home with assist    History of alcohol abuse  Alcohol is negative. Currently, there is no evidence of any alcohol withdrawal symptoms. Her LFTs are also unremarkable.    Chest pain, suspect musculoskeletal - resolved  Moderate-severe valvular stenosis  She was noted to have some possible chest pain.  She received nitroglycerin.  Her troponin is negative.  EKG shows no acute ischemic changes.  She has underlying moderate to severe aortic stenosis.  She had a left heart catheterization in 2015 that showed mild coronary artery disease. Troponins 13-->17-->16  Echo 10/20/21: EF 60-65%, LV filling pressures, moderate to severe valvular aortic stenosis  Echo 12/29/21: EF 60-65%, RV mod dilated. Decreased RV SF. No wall motion abnormality. Moderate pulmonary hypertension.  - no further cardiac work-up  necessary. Chest pain resolved    Depression, anxiety  Continue PTA Celexa, Remeron, Seroquel and trazodone  - Continue PTA hydroxyzine, also has ativan IV available    Hypertension  Resume PTA losartan, atenolol  - resume lasix 12/31 AM    Hyperlipidemia  PTA pravachol, resume on discharge given obs status     CKD Stage 2/3  Cr near 0.8-1 at baseline. Cr 1.05 on admit (similar to discharge on 12/26)  - Daily BMP    Chronic anemia  hgb usually near 10. After IVF, hgb was 8.1 likely dilutional, no signs active bleeding  - CBC in AM    Constipation  Miralax, dulcolax available    Clinically Significant Risk Factors Present on Admission              # Platelet Defect: home medication list includes an antiplatelet medication   # Obesity: last Body mass index is 32.01 kg/m .        DVT Prophylaxis: Pneumatic Compression Devices  Code Status: Full Code  Expected Discharge: 12/31/2021     Anticipated discharge location:  Awaiting care coordination huddle  Delays:     *Early Discharge Anticipated   once tolerating diet.      Myranda Block DO  Hospitalist Service  [facility]  Securely message with the Vocera Web Console (learn more here)  Text Page (7am - 6pm) via INFIMET Paging/Directory      Interval History   Patient seen and examined. She is much more comfortable this morning. Slept poorly due to IV beeping. Breathing is more comfortable, but still with wet cough without much expectoration. Daughter at bedside, reviewed issues from overnight and this morning. Given that her first neb wasn't until after 3pm on 12/29, plan to monitor for a full day with frequent nebs and assess readiness for discharge on 12/31    -Data reviewed today: I reviewed all new labs and imaging results over the last 24 hours. I personally reviewed no images or EKG's today.    Physical Exam   Temp: 97.7  F (36.5  C) Temp src: Axillary BP: 132/70 Pulse: 78   Resp: 18 SpO2: 97 % O2 Device: None (Room air) Oxygen Delivery: 2  LPM  There were no vitals filed for this visit.  Vital Signs with Ranges  Temp:  [97.7  F (36.5  C)-99.1  F (37.3  C)] 97.7  F (36.5  C)  Pulse:  [] 78  Resp:  [16-18] 18  BP: (101-136)/(53-82) 132/70  SpO2:  [92 %-97 %] 97 %  I/O last 3 completed shifts:  In: 280 [P.O.:280]  Out: -     Constitutional: Awake, alert, cooperative, comfortable  Respiratory: diffuse expiratory wheeze, occasional crackles, wet cough  Cardiovascular: Regular rate and rhythm, normal S1 and S2, and no murmur noted  GI: Normal bowel sounds, soft, non-distended, non-tender  Skin/Integumen: No rashes, no cyanosis, no edema  Other:    Medications       acamprosate  666 mg Oral TID     atenolol  25 mg Oral Daily     citalopram  20 mg Oral Daily     diclofenac  50 mg Oral BID     folic acid  1 mg Oral Daily     [START ON 12/31/2021] furosemide  20 mg Oral Daily     gabapentin  300 mg Oral TID     guaiFENesin  600 mg Oral BID     ipratropium - albuterol 0.5 mg/2.5 mg/3 mL  3 mL Nebulization Q4H While awake     losartan  50 mg Oral Daily     methocarbamol  500 mg Oral BID     mirtazapine  15 mg Oral At Bedtime     oseltamivir  30 mg Oral BID     predniSONE  10 mg Oral Daily     QUEtiapine  150 mg Oral At Bedtime     senna-docusate  1 tablet Oral BID       Data   Recent Labs   Lab 12/30/21  0621 12/29/21  0439 12/26/21  1132   WBC 7.1 23.2*  --    HGB 8.2* 10.0*  --    MCV 96 96  --     490* 428    133 131*   POTASSIUM 4.2 3.9 4.6   CHLORIDE 102 100 97   CO2 28 27 29   BUN 17 21 24   CR 0.94 1.05* 1.04   ANIONGAP 4 6 5   BIRGIT 8.6 8.5 8.9   GLC 88 142* 131*   ALBUMIN  --  3.3*  --    PROTTOTAL  --  7.5  --    BILITOTAL  --  0.4  --    ALKPHOS  --  66  --    ALT  --  25  --    AST  --  17  --        No results found for this or any previous visit (from the past 24 hour(s)).

## 2021-12-31 VITALS
TEMPERATURE: 98.4 F | HEIGHT: 64 IN | SYSTOLIC BLOOD PRESSURE: 111 MMHG | BODY MASS INDEX: 32.01 KG/M2 | RESPIRATION RATE: 16 BRPM | DIASTOLIC BLOOD PRESSURE: 63 MMHG | OXYGEN SATURATION: 94 % | HEART RATE: 69 BPM

## 2021-12-31 LAB
ANION GAP SERPL CALCULATED.3IONS-SCNC: 5 MMOL/L (ref 3–14)
BUN SERPL-MCNC: 16 MG/DL (ref 7–30)
CALCIUM SERPL-MCNC: 8.5 MG/DL (ref 8.5–10.1)
CHLORIDE BLD-SCNC: 105 MMOL/L (ref 94–109)
CO2 SERPL-SCNC: 27 MMOL/L (ref 20–32)
CREAT SERPL-MCNC: 1.03 MG/DL (ref 0.52–1.04)
ERYTHROCYTE [DISTWIDTH] IN BLOOD BY AUTOMATED COUNT: 15 % (ref 10–15)
GFR SERPL CREATININE-BSD FRML MDRD: 55 ML/MIN/1.73M2
GLUCOSE BLD-MCNC: 90 MG/DL (ref 70–99)
HCT VFR BLD AUTO: 27.9 % (ref 35–47)
HGB BLD-MCNC: 8.6 G/DL (ref 11.7–15.7)
MCH RBC QN AUTO: 29.6 PG (ref 26.5–33)
MCHC RBC AUTO-ENTMCNC: 30.8 G/DL (ref 31.5–36.5)
MCV RBC AUTO: 96 FL (ref 78–100)
PLATELET # BLD AUTO: 387 10E3/UL (ref 150–450)
POTASSIUM BLD-SCNC: 3.9 MMOL/L (ref 3.4–5.3)
RBC # BLD AUTO: 2.91 10E6/UL (ref 3.8–5.2)
SODIUM SERPL-SCNC: 137 MMOL/L (ref 133–144)
WBC # BLD AUTO: 6 10E3/UL (ref 4–11)

## 2021-12-31 PROCEDURE — 99217 PR OBSERVATION CARE DISCHARGE: CPT | Performed by: HOSPITALIST

## 2021-12-31 PROCEDURE — 250N000012 HC RX MED GY IP 250 OP 636 PS 637: Performed by: HOSPITALIST

## 2021-12-31 PROCEDURE — 250N000013 HC RX MED GY IP 250 OP 250 PS 637: Performed by: HOSPITALIST

## 2021-12-31 PROCEDURE — 250N000009 HC RX 250: Performed by: HOSPITALIST

## 2021-12-31 PROCEDURE — 250N000013 HC RX MED GY IP 250 OP 250 PS 637: Performed by: INTERNAL MEDICINE

## 2021-12-31 PROCEDURE — 85014 HEMATOCRIT: CPT | Performed by: HOSPITALIST

## 2021-12-31 PROCEDURE — G0378 HOSPITAL OBSERVATION PER HR: HCPCS

## 2021-12-31 PROCEDURE — 36415 COLL VENOUS BLD VENIPUNCTURE: CPT | Performed by: HOSPITALIST

## 2021-12-31 PROCEDURE — 80048 BASIC METABOLIC PNL TOTAL CA: CPT | Performed by: HOSPITALIST

## 2021-12-31 RX ORDER — PREDNISONE 10 MG/1
10 TABLET ORAL DAILY
Qty: 3 TABLET | Refills: 0
Start: 2021-12-31 | End: 2022-01-03

## 2021-12-31 RX ORDER — LORAZEPAM 0.5 MG/1
0.5 TABLET ORAL EVERY 8 HOURS PRN
Qty: 6 TABLET | Refills: 0 | Status: SHIPPED | OUTPATIENT
Start: 2021-12-31 | End: 2022-01-13

## 2021-12-31 RX ORDER — GUAIFENESIN 600 MG/1
600 TABLET, EXTENDED RELEASE ORAL 2 TIMES DAILY
Qty: 30 TABLET | Refills: 0 | Status: SHIPPED | OUTPATIENT
Start: 2021-12-31 | End: 2022-01-13

## 2021-12-31 RX ORDER — OSELTAMIVIR PHOSPHATE 30 MG/1
30 CAPSULE ORAL 2 TIMES DAILY
Qty: 5 CAPSULE | Refills: 0 | Status: SHIPPED | OUTPATIENT
Start: 2021-12-31 | End: 2022-01-03

## 2021-12-31 RX ORDER — IPRATROPIUM BROMIDE AND ALBUTEROL SULFATE 2.5; .5 MG/3ML; MG/3ML
3 SOLUTION RESPIRATORY (INHALATION) 4 TIMES DAILY
Qty: 90 ML | Refills: 0 | Status: SHIPPED | OUTPATIENT
Start: 2021-12-31 | End: 2022-01-13

## 2021-12-31 RX ORDER — BENZONATATE 100 MG/1
100 CAPSULE ORAL 3 TIMES DAILY PRN
Qty: 30 CAPSULE | Refills: 0 | Status: SHIPPED | OUTPATIENT
Start: 2021-12-31 | End: 2022-01-13

## 2021-12-31 RX ORDER — LORAZEPAM 0.5 MG/1
0.5 TABLET ORAL 2 TIMES DAILY PRN
Status: DISCONTINUED | OUTPATIENT
Start: 2021-12-31 | End: 2021-12-31

## 2021-12-31 RX ORDER — BENZONATATE 100 MG/1
100 CAPSULE ORAL 3 TIMES DAILY PRN
Qty: 20 CAPSULE | Refills: 0 | Status: SHIPPED | OUTPATIENT
Start: 2021-12-31 | End: 2021-12-31

## 2021-12-31 RX ORDER — GUAIFENESIN 600 MG/1
600 TABLET, EXTENDED RELEASE ORAL 2 TIMES DAILY
Qty: 14 TABLET | Refills: 0 | Status: SHIPPED | OUTPATIENT
Start: 2021-12-31 | End: 2021-12-31

## 2021-12-31 RX ADMIN — ACAMPROSATE CALCIUM 666 MG: 333 TABLET, DELAYED RELEASE ORAL at 13:39

## 2021-12-31 RX ADMIN — QUETIAPINE FUMARATE 50 MG: 25 TABLET ORAL at 06:01

## 2021-12-31 RX ADMIN — GABAPENTIN 300 MG: 300 CAPSULE ORAL at 13:39

## 2021-12-31 RX ADMIN — IPRATROPIUM BROMIDE AND ALBUTEROL SULFATE 3 ML: .5; 3 SOLUTION RESPIRATORY (INHALATION) at 13:38

## 2021-12-31 RX ADMIN — SENNOSIDES AND DOCUSATE SODIUM 1 TABLET: 50; 8.6 TABLET ORAL at 08:46

## 2021-12-31 RX ADMIN — FOLIC ACID 1 MG: 1 TABLET ORAL at 08:47

## 2021-12-31 RX ADMIN — OSELTAMIVIR PHOSPHATE 30 MG: 30 CAPSULE ORAL at 08:47

## 2021-12-31 RX ADMIN — IPRATROPIUM BROMIDE AND ALBUTEROL SULFATE 3 ML: .5; 3 SOLUTION RESPIRATORY (INHALATION) at 08:46

## 2021-12-31 RX ADMIN — FUROSEMIDE 20 MG: 20 TABLET ORAL at 08:47

## 2021-12-31 RX ADMIN — ACAMPROSATE CALCIUM 666 MG: 333 TABLET, DELAYED RELEASE ORAL at 08:47

## 2021-12-31 RX ADMIN — ATENOLOL 25 MG: 25 TABLET ORAL at 08:47

## 2021-12-31 RX ADMIN — ACETAMINOPHEN 650 MG: 325 TABLET, FILM COATED ORAL at 14:57

## 2021-12-31 RX ADMIN — DICLOFENAC SODIUM 50 MG: 25 TABLET, DELAYED RELEASE ORAL at 08:47

## 2021-12-31 RX ADMIN — GUAIFENESIN 600 MG: 600 TABLET ORAL at 08:47

## 2021-12-31 RX ADMIN — CITALOPRAM HYDROBROMIDE 20 MG: 20 TABLET ORAL at 08:48

## 2021-12-31 RX ADMIN — LOSARTAN POTASSIUM 50 MG: 50 TABLET, FILM COATED ORAL at 08:48

## 2021-12-31 RX ADMIN — GABAPENTIN 300 MG: 300 CAPSULE ORAL at 08:47

## 2021-12-31 RX ADMIN — PREDNISONE 10 MG: 5 TABLET ORAL at 08:47

## 2021-12-31 RX ADMIN — METHOCARBAMOL 500 MG: 500 TABLET ORAL at 08:48

## 2021-12-31 NOTE — PLAN OF CARE
A&OX4, VSS on RA. Denies pain, n&V. Frequent cough noted, tessalon vero effective. Up ind in the room. PIV Sled. IV ativan given for self reported anxiety. On scheduled nebs. Regular diet with improved appetite today. Discharge possible tomorrow. Continue to monitor.

## 2021-12-31 NOTE — PLAN OF CARE
Observation goals  PRIOR TO DISCHARGE         1.Await procal  MET    2. Resolution of sob:shortness of breath pronounce with exertion and minimal at rest.     3. Home safety Pt met with patient yesterday, orders complete Patient discharging tomorrow 12/31/21.

## 2021-12-31 NOTE — PROGRESS NOTES
"Per order on the discharge papers for neb machine, there is a reminder \"Call [facility] at [phone] if nebulizer machine/supplies need to be dispensed by [facility].\"  MERLINE-RN called this number and requested expedited processing.     They are awaiting MD updated note which should mention that a nebulizer machine is being Rx'd.  Dr. Block aware and will include this in her discharge summary today.     MERLINE-RN will assist pt in ensuring hospital followup and PFTs are scheduled, as well as alerting Home Care agency to discharge date of today. (done)      (per notes from [facility]O 12/29/2021):  At the time of patient's discharge on 12/27, she was discharged with orders for home care.  Today writer was able to arranged for home care services to start on January 4th thru [facility] thru their [place] branch [phone]. Orders with H&P, discharge summary, orders and AVS to [phone]  Writer has noted patient has been re-admitted to short     Orders were faxed as requested, VM left for intake with heads up & unit Short Stay number [phone] for callback.    Nayeli Barry RN, BSN, PHN  [facility]  Inpatient Care Management - FLOAT  Mobile: [phone] 12/31/21 until 4pm  (after today's date, please call the patient's unit)     "

## 2021-12-31 NOTE — PROGRESS NOTES
Observation goals  PRIOR TO DISCHARGE          1. Await procal. 0.05     2. Resolution of sob, no sob noted at rest only with exertion.     3. Home safety: Yes. PT notes no skilled PT needs as pt has been independent. Pt expected to discharge tomorrow.

## 2021-12-31 NOTE — PLAN OF CARE
Alert and oriented x 4. Up independently in room. Continent of bowel and bladder. Room air. Dyspneic on exertion.  Droplet precautions mantained for influenza.  Benzonate administered for cough.  Ativan for restless, trazodone and melatonin for insomnia. BMP, CBC with platelets this AM. IV saline lock and intact.

## 2021-12-31 NOTE — DISCHARGE INSTRUCTIONS
HOMECARE NOTE:   Your doctor has ordered home care to help you after your hospital stay.  The staff will contact you to schedule your first visit.  This service will be provided by [facility].  If you have any question, or have not received a call within 48 hours of discharge, please call them at [facility] [phone]  *please see homecare quality ratings for all homecares in your area at www.[email or web address].gov

## 2021-12-31 NOTE — DISCHARGE SUMMARY
[facility]    Discharge Summary  Hospitalist    Date of Admission:  12/29/2021  Date of Discharge:  12/31/2021  Discharging Provider: Myranda Block DO  Date of Service (when I saw the patient): 12/31/21    Discharge Diagnoses   Influenza  Suspected reactive airway disease  Elevated lactic acid, improved  History of alcohol abuse  Chest pain, suspect musculoskeletal - resolved  Moderate-severe valvular stenosis  Depression, anxiety  Hypertension  Hyperlipidemia  CKD Stage 2/3  Chronic anemia  Constipation    History of Present Illness   Kavitha Headley is an 76 year old female who presented with shortness of breath, fever and influenza, being admitted under observation status with borderline low oxygen saturations and mild lactic acid elevation. Improved with nebulizers, mucinex and tamiflu.    Hospital Course   Kavitha Headley was admitted on 12/29/2021.  The following problems were addressed during her hospitalization:    Influenza  Suspected reactive airway disease  Elevated lactic acid, improved  CXR shows no infiltrates. procal 0.05. LA 2.6 on admit, likely secondary to poor intake. Received 1L IVF in ED, LA improved to 1.5  - renal dose tamiflu BID x5 days, has 5 more doses on discharge  - leukocytosis likely secondary to recent steroids (resolved after IVF on 12/30)  - stable on room air, did not need oxygen for hypoxia  - tessalon PRN ordered on discharge  - mucinex 600mg BID, take for 7 days then as needed thereafter  - duonebs QID on discharge. She has inhaler at home, but did not start to have vast improvement of her respiratory status until she was started on nebulizer treatments. She does not have a nebulizer at home and was prescribed a nebulizer machine and duonebs that she will continue on discharge for 1 week and as needed.  - prolong prednisone taper by 3 more days and complete her last 3 tablets 10mg once daily  - PT Eval recommends home with assist  - follow up with PCP  within one week  - Follow up with pulmonology within one month with PFTs at that time (Dr Ventura saw her during last admit)  - resume home care     History of alcohol abuse  Alcohol is negative. Currently, there is no evidence of any alcohol withdrawal symptoms. Her LFTs are also unremarkable.     Chest pain, suspect musculoskeletal - resolved  Moderate-severe valvular stenosis  She was noted to have some possible chest pain.  She received nitroglycerin.  Her troponin is negative.  EKG shows no acute ischemic changes.  She has underlying moderate to severe aortic stenosis.  She had a left heart catheterization in 2015 that showed mild coronary artery disease. Troponins 13-->17-->16  Echo 10/20/21: EF 60-65%, LV filling pressures, moderate to severe valvular aortic stenosis  Echo 12/29/21: EF 60-65%, RV mod dilated. Decreased RV SF. No wall motion abnormality. Moderate pulmonary hypertension.  - no further cardiac work-up necessary. Chest pain resolved     Depression, anxiety  Continue PTA Celexa, Remeron, Seroquel and trazodone  - Continue PTA hydroxyzine, plan for PRN oral ativan for few doses on discharge as she felt this worked better than hydroxyzine     Hypertension  Resumed PTA losartan, atenolol     Hyperlipidemia  PTA pravachol, resumed on discharge given obs status     CKD Stage 2/3  Cr near 0.8-1 at baseline. Cr 1.05 on admit (similar to discharge on 12/26)  - Stable     Chronic anemia  hgb usually near 10. After IVF, hgb was 8.1 likely dilutional, no signs active bleeding  - Hgb stable at 8.6 on day of discharge     Constipation- resolved  Had BM on 12/30    Myranda Block, DO    Significant Results and Procedures   See above    Pending Results   These results will be followed up by PCP  Unresulted Labs Ordered in the Past 30 Days of this Admission     Date and Time Order Name Status Description    12/29/2021  5:43 AM Blood Culture Line, venous Preliminary     12/29/2021  5:43 AM Blood Culture Peripheral  Blood Preliminary           Code Status   Full Code       Primary Care Physician   Nayeli Castorena    Physical Exam   Temp: 98.4  F (36.9  C) Temp src: Oral BP: 111/63 Pulse: 69   Resp: 16 SpO2: 94 % O2 Device: None (Room air)    There were no vitals filed for this visit.  Vital Signs with Ranges  Temp:  [97.7  F (36.5  C)-98.4  F (36.9  C)] 98.4  F (36.9  C)  Pulse:  [63-78] 69  Resp:  [16-22] 16  BP: (111-132)/(53-70) 111/63  SpO2:  [92 %-97 %] 94 %  I/O last 3 completed shifts:  In: 280 [P.O.:280]  Out: -     Patient seen and examined this morning and afternoon. She is feeling better. She is able to have more productive cough and expectorated large amount of phlegm this morning. She agrees that going home with nebulizer is for the best as she felt most improvement with these. No chest pain, nausea, vomiting. She feels the ativan works better than hydroxyzine for her anxiety as well, which has been worsened due to prednisone.    Constitutional: Awake, alert, cooperative, no apparent distress.  Eyes: Conjunctiva and pupils examined and normal.  HEENT: Moist mucous membranes, normal dentition.  Respiratory: occasional crackles, wet cough with expiratory wheeze  Cardiovascular: Regular rate and rhythm, normal S1 and S2, and no murmur noted.  GI: Soft, non-distended, non-tender, normal bowel sounds.  Lymph/Hematologic: No anterior cervical or supraclavicular adenopathy.  Skin: No rashes, no cyanosis, no edema.  Musculoskeletal: No joint swelling, erythema or tenderness.  Neurologic: Cranial nerves 2-12 intact, normal strength and sensation.  Psychiatric: Alert, oriented to person, place and time, no obvious anxiety or depression.    Discharge Disposition   Discharged to home with resumption of home care RN/PT/OT  Condition at discharge: Stable    Consultations This Hospital Stay   PHYSICAL THERAPY ADULT IP CONSULT    Time Spent on this Encounter   Myranda HUANG DO, personally saw the patient today and spent  greater than 30 minutes discharging this patient.    Discharge Orders      Home care nursing referral      Home Care PT Referral for Hospital Discharge      Home Care OT Referral for Hospital Discharge      Reason for your hospital stay    Flu A infection     Follow-up and recommended labs and tests     Follow up with primary care provider, Nayeli Castorena, within 7 days for hospital follow- up.  No follow up labs or test are needed.    Follow up with pulmonology in ~4 weeks for pulmonary function tests. [facility] [phone]     Activity    Your activity upon discharge: activity as tolerated     Discharge Instructions    1. Use nebulizer four times per day for next week, then use as needed    2. Take mucinex 600mg twice daily for one week, then can use as needed for congested cough    3. Complete your prednisone taper with your last 3 tablets of 10mg prednisone with 10mg once daily as you still need some steroid with your respiratory status.     General PFT Lab (Please always keep checked)     Pulmonary Function Test    .     Nebulizer and Nebulizer Supplies    Nebulizer/Supplies Documentation:   The patient was seen 12/31/2021. After assessment, the patient will need to be treated with ongoing nebulizer for treatment/management of reactive airway disease with viral infection    I, the undersigned, certify that the above prescribed supplies are medically necessary for this patient and is both reasonable and necessary in reference to accepted standards of medical and necessary in reference to accepted standards of medical practice in the treatment of this patient's condition and is not prescribed as a convenience.     Diet    Follow this diet upon discharge: Regular Diet Adult     Discharge Medications   Current Discharge Medication List      START taking these medications    Details   benzonatate (TESSALON) 100 MG capsule Take 1 capsule (100 mg) by mouth 3 times daily as needed for cough  Qty: 30 capsule,  Refills: 0    Associated Diagnoses: Influenza A      guaiFENesin (MUCINEX) 600 MG 12 hr tablet Take 1 tablet (600 mg) by mouth 2 times daily For 7 days. Then take as needed  Qty: 30 tablet, Refills: 0    Associated Diagnoses: Influenza A      oseltamivir (TAMIFLU) 30 MG capsule Take 1 capsule (30 mg) by mouth 2 times daily for 3 days  Qty: 5 capsule, Refills: 0    Comments: Future refills by PCP Dr. Nayeli Castorena with phone number [phone].  Associated Diagnoses: Influenza A         CONTINUE these medications which have CHANGED    Details   ipratropium - albuterol 0.5 mg/2.5 mg/3 mL (DUONEB) 0.5-2.5 (3) MG/3ML neb solution Take 1 vial (3 mLs) by nebulization 4 times daily  Qty: 90 mL, Refills: 0    Comments: Future refills by PCP Dr. Nayeli Castorena with phone number [phone].  Associated Diagnoses: Mild intermittent reactive airway disease with acute exacerbation      predniSONE (DELTASONE) 10 MG tablet Take 1 tablet (10 mg) by mouth daily for 3 days  Qty: 3 tablet, Refills: 0    Associated Diagnoses: Mild intermittent reactive airway disease with acute exacerbation         CONTINUE these medications which have NOT CHANGED    Details   acamprosate (CAMPRAL) 333 MG EC tablet Take 2 tablets (666 mg) by mouth 3 times daily  Qty: 180 tablet, Refills: 3    Associated Diagnoses: Alcohol use disorder, severe, in early remission (H)      acetaminophen (TYLENOL) 500 MG tablet Take 1,000 mg by mouth 2 times daily as needed for pain      albuterol (PROAIR HFA/PROVENTIL HFA/VENTOLIN HFA) 108 (90 Base) MCG/ACT inhaler Inhale 2 puffs into the lungs every 6 hours as needed for wheezing  Qty: 18 g, Refills: 0    Comments: Pharmacy may dispense brand covered by insurance (Proair, or proventil or ventolin or generic albuterol inhaler)  Associated Diagnoses: Mild intermittent reactive airway disease with acute exacerbation      aspirin 81 MG EC tablet Take 81 mg by mouth daily      atenolol (TENORMIN) 25 MG tablet  Take 1 tablet (25 mg) by mouth daily  Qty: 100 tablet, Refills: 3      citalopram (CELEXA) 20 MG tablet Take 1 tablet (20 mg) by mouth daily  Qty: 90 tablet, Refills: 0    Associated Diagnoses: Mild major depression (H); Anxiety      diclofenac (VOLTAREN) 50 MG EC tablet Take 1 tablet (50 mg) by mouth 2 times daily  Qty: 60 tablet, Refills: 1    Associated Diagnoses: Chronic pain syndrome      folic acid (FOLVITE) 1 MG tablet Take 1 mg by mouth daily      furosemide (LASIX) 20 MG tablet TAKE 1 TABLET DAILY  Qty: 100 tablet, Refills: 4    Associated Diagnoses: Edema, unspecified type      gabapentin (NEURONTIN) 300 MG capsule Take 1 capsule (300 mg) by mouth 3 times daily  Qty: 270 capsule, Refills: 1    Associated Diagnoses: Chronic pain syndrome      guaiFENesin-dextromethorphan (ROBITUSSIN DM) 100-10 MG/5ML syrup Take 10 mLs by mouth every 4 hours as needed for cough      hydrOXYzine (ATARAX) 25 MG tablet Take 25 mg by mouth every 6 hours as needed for anxiety      losartan (COZAAR) 50 MG tablet Take 1 tablet (50 mg) by mouth daily  Qty: 30 tablet, Refills: 0    Comments: Future refills by PCP Dr. Nayeli Castorena with phone number [phone].  Associated Diagnoses: Benign essential hypertension      methocarbamol (ROBAXIN) 500 MG tablet Take 500 mg by mouth 2 times daily      mirtazapine (REMERON) 15 MG tablet Take 1 tablet (15 mg) by mouth At Bedtime  Qty: 30 tablet, Refills: 3    Associated Diagnoses: Chronic insomnia; Mild major depression (H)      mometasone-formoterol (DULERA) 200-5 MCG/ACT inhaler Inhale 2 puffs into the lungs 2 times daily  Qty: 13 g, Refills: 0    Comments: Please substitute to any equivalent medication that is more affordable / insurance preferred.  Associated Diagnoses: Reactive airway disease with acute exacerbation, unspecified asthma severity, unspecified whether persistent      Multiple Vitamins-Minerals (CENTRUM SILVER) per tablet Take 1 tablet by mouth daily      polyethylene  glycol (MIRALAX) 17 GM/Dose powder Take 17 g by mouth 2 times daily as needed for constipation      pravastatin (PRAVACHOL) 20 MG tablet Take 1 tablet (20 mg) by mouth daily  Qty: 100 tablet, Refills: 4      QUEtiapine (SEROQUEL XR) 150 MG TB24 24 hr tablet Take 1 tablet (150 mg) by mouth At Bedtime  Qty: 30 tablet, Refills: 1    Associated Diagnoses: Chronic insomnia      QUEtiapine (SEROQUEL) 50 MG tablet Take 1 tablet (50 mg) by mouth nightly as needed (Anxiety, insomnia, hallucinations)  Qty: 30 tablet, Refills: 8    Associated Diagnoses: Chronic insomnia      senna-docusate (SENOKOT-S/PERICOLACE) 8.6-50 MG tablet Take 1 tablet by mouth 2 times daily  Qty:      Associated Diagnoses: Slow transit constipation      traZODone (DESYREL) 100 MG tablet TAKE 1 TABLET NIGHTLY AS NEEDED FOR SLEEP  Qty: 90 tablet, Refills: 2    Comments: YOUR PATIENT HAS REQUESTED A REFILL OF THIS MEDICATION, PREVIOUSLY AUTHORIZED BY ANOTHER PRESCRIBER.  Associated Diagnoses: Chronic insomnia         STOP taking these medications       thiamine (B-1) 100 MG tablet Comments:   Reason for Stopping:             Allergies   Allergies   Allergen Reactions     Bactrim [Sulfamethoxazole W/Trimethoprim] Hives     Codeine Itching     NAUSEA     Morphine Itching     NAUSEA     Data   Most Recent 3 CBC's:  Recent Labs   Lab Test 12/31/21  0645 12/30/21  0621 12/29/21  0439   WBC 6.0 7.1 23.2*   HGB 8.6* 8.2* 10.0*   MCV 96 96 96    360 490*      Most Recent 3 BMP's:  Recent Labs   Lab Test 12/31/21  0645 12/30/21  0621 12/29/21  0439    134 133   POTASSIUM 3.9 4.2 3.9   CHLORIDE 105 102 100   CO2 27 28 27   BUN 16 17 21   CR 1.03 0.94 1.05*   ANIONGAP 5 4 6   BIRGIT 8.5 8.6 8.5   GLC 90 88 142*     Most Recent 2 LFT's:  Recent Labs   Lab Test 12/29/21  0439 12/17/21  1402   AST 17 19   ALT 25 17   ALKPHOS 66 134   BILITOTAL 0.4 0.4     Most Recent INR's and Anticoagulation Dosing History:  Anticoagulation Dose History     Recent Dosing and  Labs Latest Ref Rng & Units 2012 2015 2017 6/10/2017 2017 2017 3/30/2021    INR <1.3 0.93 0.86 1.02 1.19(H) 1.15(H) 1.03 0.9        Most Recent 3 Troponin's:  Recent Labs   Lab Test 21  1506 20  0534 20  0049   TROPI <0.015 <0.015 <0.015     Most Recent Cholesterol Panel:  Recent Labs   Lab Test 10/20/21  1251   CHOL 218*   LDL 85   HDL 86   TRIG 234*     Most Recent 6 Bacteria Isolates From Any Culture (See EPIC Reports for Culture Details):  Recent Labs   Lab Test 17  1530 17  1457 14  1815 14  1445   CULT No growth No growth >100,000 colonies/mL Escherichia coli* >100,000 colonies/mL Mixed gram negative and positive johana Multiple species present, probable perineal contamination. Susceptibility testing not routinely done     Most Recent TSH, T4 and A1c Labs:  Recent Labs   Lab Test 21  1612   TSH 1.05     Results for orders placed or performed during the hospital encounter of 21   XR Chest Port 1 View    Narrative    EXAM: XR CHEST PORT 1 VIEW  LOCATION: [facility]  DATE/TIME: 2021 5:32 AM    INDICATION: SOB, fever  COMPARISON: CT from 2021.      Impression    IMPRESSION: Cardiac silhouette is enlarged. Calcified aortic arch. Mild prominence of the central pulmonary vascularity. No definite airspace consolidation. Vague opacification in the retrocardiac region may be attributable to overlying soft tissue   attenuation and patient's breast implant. No visible pneumothorax.   Echocardiogram Limited     Value    LVEF  60-65%    Narrative    [id number]  [id number]  [id number]  [id number]^KIKI^ROMARIO^ROBERTAWinona Community [facility] Hospital  Echocardiography Laboratory  [address] [place] [id number]     Name: DARWIN JASSO  MRN: [id number]  : 1945  Study Date: 2021 02:57 PM  Age: 76 yrs  Gender: Female  Patient Location: [facility]  Reason For Study: Chest Pain  Ordering Physician:  ROMARIO SWANSON  Referring Physician: Nayeli Castorena  Performed By: Nishant Vincent     BSA: 1.9 m2  Height: 64 in  Weight: 186 lb  HR: 85  BP: 141/70 mmHg  ______________________________________________________________________________  Procedure  Limited Portable Echo Adult. Optison (NDC #[id number]) given intravenously.  ______________________________________________________________________________  Interpretation Summary     The left ventricle is normal in size.  The visual ejection fraction is 60-65%.  No regional wall motion abnormalities noted.  The right ventricle is moderately dilated.  Mildly decreased right ventricular systolic function  Moderate (46-55mmHg) pulmonary hypertension is present.  Aortic valve gradient not assessed.  ______________________________________________________________________________  Left Ventricle  The left ventricle is normal in size. There is normal left ventricular wall  thickness. The visual ejection fraction is 60-65%. No regional wall motion  abnormalities noted.     Right Ventricle  The right ventricle is moderately dilated. Mildly decreased right ventricular  systolic function.     Mitral Valve  There is trace mitral regurgitation. The mean mitral valve gradient is 7.8  mmHg. There is mild to moderate mitral stenosis.     Tricuspid Valve  The right ventricular systolic pressure is approximated at 45.3 mmHg plus the  right atrial pressure. Moderate (46-55mmHg) pulmonary hypertension is present.     Vessels  The ascending aorta is Mildly dilated.  ______________________________________________________________________________  MMode/2D Measurements & Calculations     IVSd: 1.1 cm  LVIDd: 4.6 cm  LVIDs: 2.8 cm  LVPWd: 0.84 cm  FS: 40.5 %  LV mass(C)d: 152.2 grams  LV mass(C)dI: 80.3 grams/m2  LA dimension: 4.3 cm  asc Aorta Diam: 4.1 cm  RWT: 0.36     Doppler Measurements & Calculations  MV max P.1 mmHg  MV mean P.8 mmHg  MV V2 VTI: 61.7 cm  TR max hermes: 336.6  cm/sec  TR max P.3 mmHg     ______________________________________________________________________________  Report approved by: Felipe Squires 2021 04:04 PM           *Note: Due to a large number of results and/or encounters for the requested time period, some results have not been displayed. A complete set of results can be found in Results Review.

## 2022-01-02 DIAGNOSIS — Z71.89 OTHER SPECIFIED COUNSELING: ICD-10-CM

## 2022-01-03 ENCOUNTER — PATIENT OUTREACH (OUTPATIENT)
Dept: CARE COORDINATION | Facility: CLINIC | Age: 77
End: 2022-01-03
Payer: COMMERCIAL

## 2022-01-03 LAB
BACTERIA BLD CULT: NO GROWTH
BACTERIA BLD CULT: NO GROWTH

## 2022-01-03 NOTE — PROGRESS NOTES
Clinic Care Coordination Contact  [facility]/Voicemail       Clinical Data: Care Coordinator Outreach  Outreach attempted x 1.  Left message on patient's voicemail with call back information and requested return call.  Plan:  Care Coordinator will try to reach patient again in 1-2 business days.      CHW tried calling patient back but it went to voicemail. CHW left call back number, otherwise CHW will try calling back again tomorrow.      ADILSON Whitmore  [phone]  [facility]

## 2022-01-03 NOTE — PROGRESS NOTES
Clinic Care Coordination Contact  Community Health Worker Initial Outreach       Patient asked CHW if I could call back around 11am.    LLOYD WhitmoreW  [phone]  [facility]

## 2022-01-04 ENCOUNTER — TELEPHONE (OUTPATIENT)
Dept: FAMILY MEDICINE | Facility: CLINIC | Age: 77
End: 2022-01-04
Payer: COMMERCIAL

## 2022-01-04 NOTE — PROGRESS NOTES
Clinic Care Coordination Contact  [facility]/Voicemail       Clinical Data: Care Coordinator Outreach  Outreach attempted x 2.  Left message on patient's voicemail with call back information and requested return call.  Plan: Care Coordinator will do no further outreaches at this time.      ADILSON Whitmore  [phone]  [facility]

## 2022-01-04 NOTE — TELEPHONE ENCOUNTER
Reason for Call:  Form, our goal is to have forms completed with 72 hours, however, some forms may require a visit or additional information.    Type of letter, form or note:  medical    Who is the form from?: Home care    Where did the form come from: form was faxed in    What clinic location was the form placed at?: [facility]    Where the form was placed: Nayeli Castorena Box/Folder    What number is listed as a contact on the form?: [phone]           Call taken on 1/4/2022 at 4:24 PM by Yuliana Rayo

## 2022-01-04 NOTE — PROGRESS NOTES
Refaxed discharge orders that include face-to-face for homecare to [facility] fax [phone]    Prednisone Counseling:  I discussed with the patient the risks of prolonged use of prednisone including but not limited to weight gain, insomnia, osteoporosis, mood changes, diabetes, susceptibility to infection, glaucoma and high blood pressure.  In cases where prednisone use is prolonged, patients should be monitored with blood pressure checks, serum glucose levels and an eye exam.  Additionally, the patient may need to be placed on GI prophylaxis, PCP prophylaxis, and calcium and vitamin D supplementation and/or a bisphosphonate.  The patient verbalized understanding of the proper use and the possible adverse effects of prednisone.  All of the patient's questions and concerns were addressed.

## 2022-01-06 ENCOUNTER — TELEPHONE (OUTPATIENT)
Dept: BEHAVIORAL HEALTH | Facility: CLINIC | Age: 77
End: 2022-01-06
Payer: COMMERCIAL

## 2022-01-06 DIAGNOSIS — F51.04 CHRONIC INSOMNIA: ICD-10-CM

## 2022-01-06 RX ORDER — QUETIAPINE 150 MG/1
150 TABLET, FILM COATED, EXTENDED RELEASE ORAL AT BEDTIME
Qty: 30 TABLET | Refills: 0 | Status: SHIPPED | OUTPATIENT
Start: 2022-01-06 | End: 2022-01-26

## 2022-01-06 NOTE — TELEPHONE ENCOUNTER
Reason for call:  Medication     If this is a refill request, has the caller requested the refill from the pharmacy already? Yes     Will the patient be using a [facility]? No     Name of the pharmacy and phone number for the current request:   [id number] - [place], [place] - [address] AT [place] [address]  [phone]    Name of the medication requested: Malaika    Other request: pt ran out of medications 4 days ago, follow-up shceudled on 2.2.2022    Phone number to reach patient:  Home number on file [phone] (home)    Best Time:  ASAP    Can we leave a detailed message on this number?  YES    Travel screening: Not Applicable

## 2022-01-06 NOTE — TELEPHONE ENCOUNTER
Script Eprescribed to pharmacy  [place] Prescription Monitoring Program checked      Signed Prescriptions:                        Disp   Refills    QUEtiapine (SEROQUEL XR) 150 MG TB24 24 hr*30 tab*0        Sig: Take 1 tablet (150 mg) by mouth At Bedtime  Authorizing Provider: JONNIE MORRIS MD

## 2022-01-07 ENCOUNTER — MEDICAL CORRESPONDENCE (OUTPATIENT)
Dept: HEALTH INFORMATION MANAGEMENT | Facility: CLINIC | Age: 77
End: 2022-01-07
Payer: COMMERCIAL

## 2022-01-07 ENCOUNTER — TELEPHONE (OUTPATIENT)
Dept: BEHAVIORAL HEALTH | Facility: CLINIC | Age: 77
End: 2022-01-07
Payer: COMMERCIAL

## 2022-01-07 NOTE — TELEPHONE ENCOUNTER
Reason for Call:  Medication or medication refill: Refill     Do you use a [facility]?  Name of the pharmacy and phone number for the current request:    [id number] - [place], [place] - [address] [facility]. AT [facility] [address]    Name of the medication requested: QUEtiapine (SEROQUEL XR) 150 MG      Other request: she is out needs ASAP    Can we leave a detailed message on this number? YES    Phone number patient can be reached at: Home number on file [phone] (home)    Best Time: ASAP    Call taken on 1/7/2022 at 1:38 PM by Rojas Love

## 2022-01-07 NOTE — TELEPHONE ENCOUNTER
Form faxed to [employer] @ # [phone].     Original sent to be scanned.     Patrice Stewart

## 2022-01-07 NOTE — TELEPHONE ENCOUNTER
Called patient and informed her that Dr. Haynes covering for Dr. Ackerman sent her Seroquel to the [facility] pharmacy she requested yesterday. She will reach out to the pharmacy.

## 2022-01-07 NOTE — TELEPHONE ENCOUNTER
[facility] [phone].     Original sent to be scanned.     Patrice Stewart

## 2022-01-11 ENCOUNTER — TELEPHONE (OUTPATIENT)
Dept: FAMILY MEDICINE | Facility: CLINIC | Age: 77
End: 2022-01-11

## 2022-01-11 DIAGNOSIS — B00.1 COLD SORE: Primary | ICD-10-CM

## 2022-01-11 NOTE — TELEPHONE ENCOUNTER
Received refill request for valacyclovir -is patient having a cold sore?  The last time this was filled was 5/12/2021    Additionally, the patient no showed her hospital follow-up appointment today - pt needs to schedule a follow up appt.   OK to schedule fasting physical 3/3/2022.      Nayeli Castorena MBA, MS, PA-C

## 2022-01-11 NOTE — TELEPHONE ENCOUNTER
Received a refill request from [facility] for Valacyclovir but do not see it on her current med list.     Please refill or advise patient       Essence Elizondo

## 2022-01-12 RX ORDER — VALACYCLOVIR HYDROCHLORIDE 1 G/1
2000 TABLET, FILM COATED ORAL 2 TIMES DAILY
Qty: 4 TABLET | Refills: 0 | Status: SHIPPED | OUTPATIENT
Start: 2022-01-12 | End: 2022-01-13

## 2022-01-12 NOTE — TELEPHONE ENCOUNTER
Made hospital follow up for tomorrow, she does want to see you and have you check her breathing.       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

## 2022-01-13 ENCOUNTER — OFFICE VISIT (OUTPATIENT)
Dept: FAMILY MEDICINE | Facility: CLINIC | Age: 77
End: 2022-01-13
Payer: COMMERCIAL

## 2022-01-13 VITALS
HEIGHT: 62 IN | OXYGEN SATURATION: 95 % | SYSTOLIC BLOOD PRESSURE: 100 MMHG | BODY MASS INDEX: 34.01 KG/M2 | TEMPERATURE: 98.1 F | DIASTOLIC BLOOD PRESSURE: 60 MMHG | WEIGHT: 184.8 LBS | RESPIRATION RATE: 17 BRPM | HEART RATE: 93 BPM

## 2022-01-13 DIAGNOSIS — B00.1 COLD SORE: ICD-10-CM

## 2022-01-13 DIAGNOSIS — F10.21 ALCOHOL USE DISORDER, SEVERE, IN EARLY REMISSION (H): ICD-10-CM

## 2022-01-13 DIAGNOSIS — F41.9 ANXIETY: ICD-10-CM

## 2022-01-13 DIAGNOSIS — E87.1 HYPONATREMIA: ICD-10-CM

## 2022-01-13 DIAGNOSIS — F10.20 ALCOHOL USE DISORDER, SEVERE, DEPENDENCE (H): Primary | ICD-10-CM

## 2022-01-13 DIAGNOSIS — J96.01 ACUTE RESPIRATORY FAILURE WITH HYPOXIA (H): ICD-10-CM

## 2022-01-13 DIAGNOSIS — J10.1 INFLUENZA A: ICD-10-CM

## 2022-01-13 DIAGNOSIS — I50.32 CHRONIC DIASTOLIC (CONGESTIVE) HEART FAILURE (H): ICD-10-CM

## 2022-01-13 DIAGNOSIS — J45.901 REACTIVE AIRWAY DISEASE WITH ACUTE EXACERBATION, UNSPECIFIED ASTHMA SEVERITY, UNSPECIFIED WHETHER PERSISTENT: ICD-10-CM

## 2022-01-13 DIAGNOSIS — D64.9 ANEMIA, UNSPECIFIED TYPE: ICD-10-CM

## 2022-01-13 DIAGNOSIS — I10 BENIGN ESSENTIAL HYPERTENSION: ICD-10-CM

## 2022-01-13 DIAGNOSIS — Z23 HIGH PRIORITY FOR 2019-NCOV VACCINE: ICD-10-CM

## 2022-01-13 LAB
ERYTHROCYTE [DISTWIDTH] IN BLOOD BY AUTOMATED COUNT: 14.8 % (ref 10–15)
HCT VFR BLD AUTO: 31.4 % (ref 35–47)
HGB BLD-MCNC: 10.4 G/DL (ref 11.7–15.7)
MCH RBC QN AUTO: 30.1 PG (ref 26.5–33)
MCHC RBC AUTO-ENTMCNC: 33.1 G/DL (ref 31.5–36.5)
MCV RBC AUTO: 91 FL (ref 78–100)
PLATELET # BLD AUTO: 332 10E3/UL (ref 150–450)
RBC # BLD AUTO: 3.45 10E6/UL (ref 3.8–5.2)
WBC # BLD AUTO: 7.6 10E3/UL (ref 4–11)

## 2022-01-13 PROCEDURE — 0064A COVID-19,PF,MODERNA (18+ YRS BOOSTER .25ML): CPT | Performed by: PHYSICIAN ASSISTANT

## 2022-01-13 PROCEDURE — 91306 COVID-19,PF,MODERNA (18+ YRS BOOSTER .25ML): CPT | Performed by: PHYSICIAN ASSISTANT

## 2022-01-13 PROCEDURE — 85027 COMPLETE CBC AUTOMATED: CPT | Performed by: PHYSICIAN ASSISTANT

## 2022-01-13 PROCEDURE — 36415 COLL VENOUS BLD VENIPUNCTURE: CPT | Performed by: PHYSICIAN ASSISTANT

## 2022-01-13 PROCEDURE — 90662 IIV NO PRSV INCREASED AG IM: CPT | Performed by: PHYSICIAN ASSISTANT

## 2022-01-13 PROCEDURE — 99495 TRANSJ CARE MGMT MOD F2F 14D: CPT | Performed by: PHYSICIAN ASSISTANT

## 2022-01-13 PROCEDURE — G0008 ADMIN INFLUENZA VIRUS VAC: HCPCS | Performed by: PHYSICIAN ASSISTANT

## 2022-01-13 RX ORDER — BENZONATATE 100 MG/1
100 CAPSULE ORAL 3 TIMES DAILY PRN
Qty: 30 CAPSULE | Refills: 1 | Status: SHIPPED | OUTPATIENT
Start: 2022-01-13 | End: 2022-02-08

## 2022-01-13 RX ORDER — LORAZEPAM 0.5 MG/1
0.5 TABLET ORAL EVERY 8 HOURS PRN
Qty: 6 TABLET | Refills: 0 | Status: CANCELLED | OUTPATIENT
Start: 2022-01-13

## 2022-01-13 RX ORDER — ALBUTEROL SULFATE 90 UG/1
2 AEROSOL, METERED RESPIRATORY (INHALATION) EVERY 6 HOURS PRN
Qty: 18 G | Refills: 0 | Status: SHIPPED | OUTPATIENT
Start: 2022-01-13 | End: 2022-07-15

## 2022-01-13 RX ORDER — IPRATROPIUM BROMIDE AND ALBUTEROL SULFATE 2.5; .5 MG/3ML; MG/3ML
3 SOLUTION RESPIRATORY (INHALATION) 4 TIMES DAILY
Qty: 90 ML | Refills: 0 | Status: SHIPPED | OUTPATIENT
Start: 2022-01-13 | End: 2022-02-03

## 2022-01-13 RX ORDER — VALACYCLOVIR HYDROCHLORIDE 1 G/1
2000 TABLET, FILM COATED ORAL 2 TIMES DAILY
Qty: 4 TABLET | Refills: 2 | Status: SHIPPED | OUTPATIENT
Start: 2022-01-13 | End: 2022-03-17

## 2022-01-13 RX ORDER — HYDROXYZINE HYDROCHLORIDE 25 MG/1
25-50 TABLET, FILM COATED ORAL EVERY 6 HOURS PRN
Qty: 60 TABLET | Refills: 0 | Status: SHIPPED | OUTPATIENT
Start: 2022-01-13 | End: 2022-02-09

## 2022-01-13 RX ORDER — LOSARTAN POTASSIUM 50 MG/1
50 TABLET ORAL DAILY
Qty: 90 TABLET | Refills: 0 | Status: SHIPPED | OUTPATIENT
Start: 2022-01-13 | End: 2022-01-17

## 2022-01-13 ASSESSMENT — ASTHMA QUESTIONNAIRES
QUESTION_4 LAST FOUR WEEKS HOW OFTEN HAVE YOU USED YOUR RESCUE INHALER OR NEBULIZER MEDICATION (SUCH AS ALBUTEROL): ONE OR TWO TIMES PER DAY
QUESTION_1 LAST FOUR WEEKS HOW MUCH OF THE TIME DID YOUR ASTHMA KEEP YOU FROM GETTING AS MUCH DONE AT WORK, SCHOOL OR AT HOME: MOST OF THE TIME
HOSPITALIZATION_OVERNIGHT_LAST_YEAR_TOTAL: TWO
QUESTION_3 LAST FOUR WEEKS HOW OFTEN DID YOUR ASTHMA SYMPTOMS (WHEEZING, COUGHING, SHORTNESS OF BREATH, CHEST TIGHTNESS OR PAIN) WAKE YOU UP AT NIGHT OR EARLIER THAN USUAL IN THE MORNING: FOUR OR MORE NIGHTS A WEEK
QUESTION_2 LAST FOUR WEEKS HOW OFTEN HAVE YOU HAD SHORTNESS OF BREATH: MORE THAN ONCE A DAY
QUESTION_5 LAST FOUR WEEKS HOW WOULD YOU RATE YOUR ASTHMA CONTROL: SOMEWHAT CONTROLLED
ACT_TOTALSCORE: 9

## 2022-01-13 ASSESSMENT — ANXIETY QUESTIONNAIRES
2. NOT BEING ABLE TO STOP OR CONTROL WORRYING: SEVERAL DAYS
7. FEELING AFRAID AS IF SOMETHING AWFUL MIGHT HAPPEN: SEVERAL DAYS
5. BEING SO RESTLESS THAT IT IS HARD TO SIT STILL: NOT AT ALL
GAD7 TOTAL SCORE: 8
1. FEELING NERVOUS, ANXIOUS, OR ON EDGE: NEARLY EVERY DAY
6. BECOMING EASILY ANNOYED OR IRRITABLE: SEVERAL DAYS
3. WORRYING TOO MUCH ABOUT DIFFERENT THINGS: SEVERAL DAYS
IF YOU CHECKED OFF ANY PROBLEMS ON THIS QUESTIONNAIRE, HOW DIFFICULT HAVE THESE PROBLEMS MADE IT FOR YOU TO DO YOUR WORK, TAKE CARE OF THINGS AT HOME, OR GET ALONG WITH OTHER PEOPLE: SOMEWHAT DIFFICULT

## 2022-01-13 ASSESSMENT — PATIENT HEALTH QUESTIONNAIRE - PHQ9
SUM OF ALL RESPONSES TO PHQ QUESTIONS 1-9: 13
5. POOR APPETITE OR OVEREATING: SEVERAL DAYS

## 2022-01-13 ASSESSMENT — PAIN SCALES - GENERAL: PAINLEVEL: MODERATE PAIN (4)

## 2022-01-13 ASSESSMENT — MIFFLIN-ST. JEOR: SCORE: 1271.5

## 2022-01-13 NOTE — RESULT ENCOUNTER NOTE
Linda  Here are your recent results.  Your hemoglobin has improved to your baseline level.      I will keep you apprised of your cardiologist follow-up plan when I hear back.      If you have any questions please do not hesitate to contact our office via phone ([phone]) or [employer].    Nayeli Castorena MS, PA-C  [facility]

## 2022-01-13 NOTE — LETTER
[facility]  [address] [place] [address]  [phone]                    January 14, 2022    Kavitha Headley  [address] [address]      Dear Kavitha,    Here is a summary of your recent test results:    Your hemoglobin has improved to your baseline level.       I will keep you apprised of your cardiologist follow-up plan when I hear back.      Your test results are enclosed.      Please contact me if you have any questions.    In addition, here is a list of due or overdue Health Maintenance reminders.    Health Maintenance Due   Topic Date Due     Heart Failure Action Plan  Never done     Asthma Action Plan - yearly  Never done     Zoster (Shingles) Vaccine (1 of 2) Never done       Please call us at [phone] (or use [employer]) to address the above recommendations.            Thank you very much for trusting [facility].     Healthy regards,      Nayeli Castorena PA-C       Results for orders placed or performed in visit on 01/13/22   CBC with platelets     Status: Abnormal   Result Value Ref Range    WBC Count 7.6 4.0 - 11.0 10e3/uL    RBC Count 3.45 (L) 3.80 - 5.20 10e6/uL    Hemoglobin 10.4 (L) 11.7 - 15.7 g/dL    Hematocrit 31.4 (L) 35.0 - 47.0 %    MCV 91 78 - 100 fL    MCH 30.1 26.5 - 33.0 pg    MCHC 33.1 31.5 - 36.5 g/dL    RDW 14.8 10.0 - 15.0 %    Platelet Count 332 150 - 450 10e3/uL

## 2022-01-13 NOTE — PROGRESS NOTES
Assessment & Plan     Alcohol use disorder, severe, dependence (H)  Alcohol use disorder, severe, in early remission (H)  Anxiety  Patient reports that she has been sober since her hospitalization in mid December.  Denies that she declined inpatient treatment and, in fact, states that she desires inpatient treatment.  Would like to go back to [facility] where she has been treated previously as this worked well for her.  Has appointment with Dr. Ackerman of addiction medicine 2/2/2022.  Is inquiring about lorazepam that was prescribed for a short quantity (6 tablets) and wondering about refills of this.  Does have hydroxyzine on hand but states this is not work quite as well.  Denies any withdrawal symptoms.  Is not doing any AA meetings at this time.  - hydrOXYzine (ATARAX) 25 MG tablet  Dispense: 60 tablet; Refill: 0  - Adult Mental Health Referral    Acute respiratory failure with hypoxia (H)  Influenza A  Reactive airway disease with acute exacerbation, unspecified asthma severity, unspecified whether persistent  No history of asthma per patient report.  Due to significant respiratory symptoms while inpatient pulmonology follow-up recommended as an outpatient.  She does not currently have an appointment.  She was seen by Dr. Nash Ventura while inpatient.  We will place a referral for [place] [facility].  Refilled Dulera and Tessalon Perles.  Patient does not need a refill of nebulizer solution at this time.  - mometasone-formoterol (DULERA) 200-5 MCG/ACT inhaler  Dispense: 13 g; Refill: 0  - benzonatate (TESSALON) 100 MG capsule  Dispense: 30 capsule; Refill: 1  - Adult Pulmonary Medicine Referral    Chronic diastolic (congestive) heart failure (H)  Benign essential hypertension  Mild decrease in right ventricular function when compared to approximately 1 month ago.  Will reach out to cardiology  to see if she would like to see her in the office.  Additionally, her losartan was increased from 25 to  "50 mg while inpatient and she is now somewhat hypotensive (denies lightheadedness/dizziness) and I would like to know if she would like to keep it at this dose.  Staff message sent and I will keep Linda apprised of her response.  - losartan (COZAAR) 50 MG tablet  Dispense: 90 tablet; Refill: 0    Cold sore  Intermittent flares with stress.  Has a few active lesions around her lips.  Would like refill today.  - valACYclovir (VALTREX) 1000 mg tablet  Dispense: 4 tablet; Refill: 2    Anemia, unspecified type  Acute on chronic worsening of anemia while inpatient.  We will recheck today.  Stabilized to previous levels.  - CBC with platelets  - CBC with platelets    Hyponatremia  Normalized during hospitalization.  Abstaining from alcohol strongly encouraged.    High priority for 2019-nCoV vaccine  Vaccinated today  - COVID-19,PF,MODERNA (18+ Yrs BOOSTER .25mL)     BMI:   Estimated body mass index is 33.8 kg/m  as calculated from the following:    Height as of this encounter: 1.575 m (5' 2\").    Weight as of this encounter: 83.8 kg (184 lb 12.8 oz).   Weight management plan: Discussed healthy diet and exercise guidelines      Return in about 20 days (around 2/2/2022) for Dr. Ackerman.    Nayeli Castorena PA-C  [facility]   Linda is a 76 year old who presents for the following health issues     HPI       Hospital Follow-up Visit:    Hospital/Nursing Home/IP Rehab Facility: [facility]  Date of Admission: 12/29/21  Date of Discharge: 12/31/21  Also admitted 12/17 to 12/27 to [facility] for acute hypoxic respiratory failure, alcohol use disorder severe with withdrawal -relapsed approximately September 2021.  Reason(s) for Admission: Influenza A/Hypoxia    Status now: still has cough, SOB upon exertion, fatigue and daytime drowsiness    Influenza  Suspected reactive airway disease  Elevated lactic acid, improved  CXR shows no infiltrates. procal 0.05. LA " 2.6 on admit, likely secondary to poor intake. Received 1L IVF in ED, LA improved to 1.5  - renal dose tamiflu BID x5 days, has 5 more doses on discharge  - leukocytosis likely secondary to recent steroids (resolved after IVF on 12/30)  - stable on room air, did not need oxygen for hypoxia  - tessalon PRN ordered on discharge  - mucinex 600mg BID, take for 7 days then as needed thereafter  - duonebs QID on discharge. She has inhaler at home, but did not start to have vast improvement of her respiratory status until she was started on nebulizer treatments. She does not have a nebulizer at home and was prescribed a nebulizer machine and duonebs that she will continue on discharge for 1 week and as needed.  - prolong prednisone taper by 3 more days and complete her last 3 tablets 10mg once daily  - PT Gilles recommends home with assist  - follow up with PCP within one week  - Follow up with pulmonology within one month with PFTs at that time (Dr Ventura saw her during last admit)  - resume home care     History of alcohol abuse  Alcohol is negative. Currently, there is no evidence of any alcohol withdrawal symptoms. Her LFTs are also unremarkable.       Chest pain, suspect musculoskeletal - resolved  Moderate-severe valvular stenosis  She was noted to have some possible chest pain.  She received nitroglycerin.  Her troponin is negative.  EKG shows no acute ischemic changes.  She has underlying moderate to severe aortic stenosis.  She had a left heart catheterization in 2015 that showed mild coronary artery disease. Troponins 13-->17-->16  Echo 10/20/21: EF 60-65%, LV filling pressures, moderate to severe valvular aortic stenosis  Echo 12/29/21: EF 60-65%, RV mod dilated. Decreased RV SF. No wall motion abnormality. Moderate pulmonary hypertension.  - no further cardiac work-up necessary. Chest pain resolved     Depression, anxiety  Continue PTA Celexa, Remeron, Seroquel and trazodone  - Continue PTA hydroxyzine, plan for  "PRN oral ativan for few doses on discharge as she felt this worked better than hydroxyzine     Hypertension  Resumed PTA losartan, atenolol     Hyperlipidemia  PTA pravachol, resumed on discharge given obs status     CKD Stage 2/3  Cr near 0.8-1 at baseline. Cr 1.05 on admit (similar to discharge on 12/26)  - Stable     Chronic anemia  hgb usually near 10. After IVF, hgb was 8.1 likely dilutional, no signs active bleeding  - Hgb stable at 8.6 on day of discharge     Was your hospitalization related to COVID-19? No   Problems taking medications regularly:  None  Medication changes since discharge: Taking tessalon 100 mg on discharge and Duoneb  Problems adhering to non-medication therapy:  None    Summary of hospitalization:  [facility] hospital discharge summary reviewed  Diagnostic Tests/Treatments reviewed.  Follow up needed: Cardiology, addiction medicine, pulmonology  Other Healthcare Providers Involved in Patient s Care:         Specialist appointment - Addiction medicine (appointment 2/2/2022), pulmonology (needs to schedule), cardiology (nothing scheduled)  Update since discharge: improved.   Doing incentive spirometry twice daily.  Using DuoNeb nebulizer twice daily.  Using Dulera 2 puffs twice daily.  Still coughing and short of breath.  Post Discharge Medication Reconciliation: .  Plan of care communicated with patient                Review of Systems   Constitutional, HEENT, cardiovascular, pulmonary, GI, , musculoskeletal, neuro, skin, endocrine and psych systems are negative, except as otherwise noted.      Objective    /60 (BP Location: Right arm, Patient Position: Sitting, Cuff Size: Adult Regular)   Pulse 93   Temp 98.1  F (36.7  C) (Oral)   Resp 17   Ht 1.575 m (5' 2\")   Wt 83.8 kg (184 lb 12.8 oz)   SpO2 95%   BMI 33.80 kg/m    Body mass index is 33.8 kg/m .  Physical Exam   GENERAL: healthy, alert and no distress  EYES: Eyes grossly normal to inspection  RESP: lungs clear to " auscultation - no rales, rhonchi or wheezes  CV: regular rate and rhythm, normal S1 S2, no S3 or S4, IV/VI holosystolic murmur, click or rub, no peripheral edema and peripheral pulses strong  MS: no gross musculoskeletal defects noted, no edema  SKIN: no suspicious lesions or rashes  NEURO: Normal strength and tone, mentation intact and speech normal  PSYCH: mentation appears normal, affect normal/bright    Results for orders placed or performed in visit on 01/13/22   CBC with platelets     Status: Abnormal   Result Value Ref Range    WBC Count 7.6 4.0 - 11.0 10e3/uL    RBC Count 3.45 (L) 3.80 - 5.20 10e6/uL    Hemoglobin 10.4 (L) 11.7 - 15.7 g/dL    Hematocrit 31.4 (L) 35.0 - 47.0 %    MCV 91 78 - 100 fL    MCH 30.1 26.5 - 33.0 pg    MCHC 33.1 31.5 - 36.5 g/dL    RDW 14.8 10.0 - 15.0 %    Platelet Count 332 150 - 450 10e3/uL     Recent Results (from the past 744 hour(s))   XR Chest Port 1 View    Narrative    CHEST ONE VIEW PORTABLE December 17, 2021 1:23 PM     HISTORY: Cough, shortness of breath.    COMPARISON: 3/23/2021.      Impression    IMPRESSION: Prominent heart size similar to prior. Pulmonary  vascularity within normal limits. No airspace consolidation,  pneumothorax, or pleural effusion.    KELLEY MOORE MD         SYSTEM ID:  [id number]   CT Chest Pulmonary Embolism w Contrast    Narrative    CT CHEST PULMONARY EMBOLISM WITH CONTRAST 12/17/2021 3:45 PM    CLINICAL HISTORY: PE suspected, high probability. Cough, hypoxia,  clear x-ray.    TECHNIQUE: CT angiogram chest during arterial phase injection IV  contrast. 2D and 3D MIP reconstructions were performed by the CT  technologist. Dose reduction techniques were used.     CONTRAST: 68mL [id number]    COMPARISON: 2/13/2020.    FINDINGS:  ANGIOGRAM CHEST: Main pulmonary artery is dilated at 3.9 cm. No  evidence of pulmonary embolism. The ascending thoracic aorta is  dilated at 4.1 cm. No evidence of aortic dissection.    LUNGS AND PLEURA: No airspace  consolidation or pleural effusion.  Incidental calcified granuloma in the right upper lobe.    MEDIASTINUM/AXILLAE: The heart is enlarged. No thoracic or axillary  lymphadenopathy.    CORONARY ARTERY CALCIFICATION: Moderate.    UPPER ABDOMEN: Previous gastric bypass surgery.    MUSCULOSKELETAL: No destructive bone lesions.      Impression    IMPRESSION:  1.  No evidence of pulmonary embolism.  2.  Dilated main pulmonary arteries suggest chronic pulmonary arterial  hypertension.  3.  Ectatic ascending thoracic aorta at 4.1 cm.  4.  Cardiomegaly, but no pleural effusion or evidence of pulmonary  edema.    KELLEY MOORE MD         SYSTEM ID:  [id number]   XR Chest Port 1 View    Narrative    EXAM: XR CHEST PORT 1 VIEW  LOCATION: [facility]  DATE/TIME: 2021 5:32 AM    INDICATION: SOB, fever  COMPARISON: CT from 2021.      Impression    IMPRESSION: Cardiac silhouette is enlarged. Calcified aortic arch. Mild prominence of the central pulmonary vascularity. No definite airspace consolidation. Vague opacification in the retrocardiac region may be attributable to overlying soft tissue   attenuation and patient's breast implant. No visible pneumothorax.   Echocardiogram Limited   Result Value    LVEF  60-65%    Narrative    [id number]  [id number]  [id number]^KIKI^ROMARIO^ROBERTA     [facility]  Echocardiography Laboratory  [address] [place] [address]     Name: DARWIN JASSO  MRN: [id number]  : 1945  Study Date: 2021 02:57 PM  Age: 76 yrs  Gender: Female  Patient Location: [facility]  Reason For Study: Chest Pain  Ordering Physician: ROMARIO SWANSON  Referring Physician: Nayeli Castorena  Performed By: Nishant Vincent     BSA: 1.9 m2  Height: 64 in  Weight: 186 lb  HR: 85  BP: 141/70 mmHg  ______________________________________________________________________________  Procedure  Limited Portable Echo Adult. Optison (NDC #[id number])  given intravenously.  ______________________________________________________________________________  Interpretation Summary     The left ventricle is normal in size.  The visual ejection fraction is 60-65%.  No regional wall motion abnormalities noted.  The right ventricle is moderately dilated.  Mildly decreased right ventricular systolic function  Moderate (46-55mmHg) pulmonary hypertension is present.  Aortic valve gradient not assessed.  ______________________________________________________________________________  Left Ventricle  The left ventricle is normal in size. There is normal left ventricular wall  thickness. The visual ejection fraction is 60-65%. No regional wall motion  abnormalities noted.     Right Ventricle  The right ventricle is moderately dilated. Mildly decreased right ventricular  systolic function.     Mitral Valve  There is trace mitral regurgitation. The mean mitral valve gradient is 7.8  mmHg. There is mild to moderate mitral stenosis.     Tricuspid Valve  The right ventricular systolic pressure is approximated at 45.3 mmHg plus the  right atrial pressure. Moderate (46-55mmHg) pulmonary hypertension is present.     Vessels  The ascending aorta is Mildly dilated.  ______________________________________________________________________________  MMode/2D Measurements & Calculations     IVSd: 1.1 cm  LVIDd: 4.6 cm  LVIDs: 2.8 cm  LVPWd: 0.84 cm  FS: 40.5 %  LV mass(C)d: 152.2 grams  LV mass(C)dI: 80.3 grams/m2  LA dimension: 4.3 cm  asc Aorta Diam: 4.1 cm  RWT: 0.36     Doppler Measurements & Calculations  MV max P.1 mmHg  MV mean P.8 mmHg  MV V2 VTI: 61.7 cm  TR max hermes: 336.6 cm/sec  TR max P.3 mmHg     ______________________________________________________________________________  Report approved by: Felipe Squires 2021 04:04 PM

## 2022-01-14 ENCOUNTER — TELEPHONE (OUTPATIENT)
Dept: FAMILY MEDICINE | Facility: CLINIC | Age: 77
End: 2022-01-14
Payer: COMMERCIAL

## 2022-01-14 ASSESSMENT — ANXIETY QUESTIONNAIRES: GAD7 TOTAL SCORE: 8

## 2022-01-14 ASSESSMENT — ASTHMA QUESTIONNAIRES: ACT_TOTALSCORE: 9

## 2022-01-14 NOTE — TELEPHONE ENCOUNTER
Reason for Call:  Form, our goal is to have forms completed with 72 hours, however, some forms may require a visit or additional information.    Type of letter, form or note:  medical    Who is the form from?: Home care    Where did the form come from: form was faxed in    What clinic location was the form placed at?: [facility] [place]    Where the form was placed: Nayeli Castorena PA-C Box/Folder    What number is listed as a contact on the form?: [phone]       Additional comments: [facility]    Call taken on 1/14/2022 at 8:33 AM by Sofía Villatoro

## 2022-01-17 ENCOUNTER — TELEPHONE (OUTPATIENT)
Dept: FAMILY MEDICINE | Facility: CLINIC | Age: 77
End: 2022-01-17
Payer: COMMERCIAL

## 2022-01-17 DIAGNOSIS — I10 BENIGN ESSENTIAL HYPERTENSION: ICD-10-CM

## 2022-01-17 PROBLEM — J06.9 VIRAL URI: Status: RESOLVED | Noted: 2021-12-20 | Resolved: 2022-01-17

## 2022-01-17 PROBLEM — I50.32 CHRONIC DIASTOLIC (CONGESTIVE) HEART FAILURE (H): Status: ACTIVE | Noted: 2022-01-17

## 2022-01-17 PROBLEM — R09.02 HYPOXIA: Status: RESOLVED | Noted: 2021-12-29 | Resolved: 2022-01-17

## 2022-01-17 RX ORDER — LOSARTAN POTASSIUM 25 MG/1
25 TABLET ORAL DAILY
Qty: 90 TABLET | Refills: 1 | Status: ON HOLD | OUTPATIENT
Start: 2022-01-17 | End: 2022-03-16

## 2022-01-17 NOTE — TELEPHONE ENCOUNTER
Triage: Please call patient and advise of Dr. Ugarte's recommendation to decrease losartan from current 50 mg back down to 25 mg.  New prescription sent to pharmacy with updated dosing.    No need to follow-up with cardiology any earlier than originally planned (end of May 2022 should be next visit with cardiology)      Nayeli Castorena MBA, MS, PA-C

## 2022-01-17 NOTE — TELEPHONE ENCOUNTER
----- Message from Herman Ugarte MD sent at 2022  2:29 PM CST -----  Regarding: RE: mutual patient - update  Ankush omer for your message and update.I am so sorry that she is back to drinking alcohol.  Unfortunately, this has been a long ivey for her.I recommend the followin.  Decrease losartan to 25 mg daily.2.  No change in follow-up plan based on the mildly decreased RV function.  I will see her as originally planned.Thank you.Martha Ugarte    ----- Message -----  From: Nayeli Castorena PA-C  Sent: 2022   1:59 PM CST  To: Herman Ugarte MD  Subject: mutual patient - update                          Dr. Ugarte-  I just saw our mutual patient, Linda, who is now out of the hospital for Influenza A and acute respiratory failure.  Unfortunately she was admitted twice in December and had relapsed with alcohol on/around September.  She states that she was drinking a liter of demetrice every 4 days.    During her last admission (end of December) she had a repeat echocardiogram that showed a mildly decreased right ventricular function when compared to the previous study.  Does this change her follow-up plan from your November visit with her?    Also, I asked her to keep a blood pressure diary because the hospitalist service increased her losartan from 25 to 50 mg and she was hypotensive in the office today.  She is not checking these at home.  Let me know if you would like me to have her change anything with her current dosing.    I appreciate your insight and expertise!      Nayeli Castorena MBA, MS, PA-C  [facility]

## 2022-01-18 DIAGNOSIS — Z53.9 DIAGNOSIS NOT YET DEFINED: Primary | ICD-10-CM

## 2022-01-18 PROCEDURE — G0180 MD CERTIFICATION HHA PATIENT: HCPCS | Performed by: PHYSICIAN ASSISTANT

## 2022-01-18 NOTE — TELEPHONE ENCOUNTER
MED REC DONE     Discrepancies:         Meds on Epic but NOT  on Form:         acetaminophen (TYLENOL) 500 MG tablet     --   Sig - Route: Take 1,000 mg by mouth 2 times daily as needed for pain - Oral     albuterol (PROAIR HFA/PROVENTIL HFA/VENTOLIN HFA) 108 (90 Base) MCG/ACT inhaler 18 g 0 1/13/2022  No   Sig - Route: Inhale 2 puffs into the lungs every 6 hours as needed for wheezing - Inhalation     aspirin 81 MG EC tablet     --   Sig - Route: Take 81 mg by mouth daily - Oral     folic acid (FOLVITE) 1 MG tablet     --   Sig - Route: Take 1 mg by mouth daily - Oral     ipratropium - albuterol 0.5 mg/2.5 mg/3 mL (DUONEB) 0.5-2.5 (3) MG/3ML neb solution 90 mL 0 1/13/2022  No   Sig - Route: Take 1 vial (3 mLs) by nebulization 4 times daily - Nebulization   Sent to pharmacy as: Ipratropium-Albuterol 0.5-2.5 (3) MG/3ML Inhalation Solution (DUONEB     mometasone-formoterol (DULERA) 200-5 MCG/ACT inhaler 13 g 0 1/13/2022  No   Sig - Route: Inhale 2 puffs into the lungs 2 times daily - Inhalation   Sent to pharmacy as: Mometasone Furo-Formoterol Fum 200-5 MCG/ACT Inhalation Aerosol (DULERA)       Multiple Vitamins-Minerals (CENTRUM SILVER) per tablet     --   Sig - Route: Take 1 tablet by mouth daily - Oral     polyethylene glycol (MIRALAX) 17 GM/Dose powder     --   Sig - Route: Take 17 g by mouth 2 times daily as needed for constipation - Oral     QUEtiapine (SEROQUEL XR) 150 MG TB24 24 hr tablet 30 tablet 0 1/6/2022  No   Sig - Route: Take 1 tablet (150 mg) by mouth At Bedtime - Oral     senna-docusate (SENOKOT-S/PERICOLACE) 8.6-50 MG tablet    3/29/2021  No   Sig - Route: Take 1 tablet by mouth 2 times daily - Oral     valACYclovir (VALTREX) 1000 mg tablet 4 tablet 2 1/13/2022  No   Sig - Route: Take 2 tablets (2,000 mg) by mouth 2 times daily - Oral   Sent to pharmacy as: valACYclovir HCl 1 GM Oral Tablet (VALTREX)           Meds on Form but NOT on Epic :    Losartan 50 mg daily    Mucus relief 600 mg BID for 7  days then PRN     Oseltamivir 30 mg BID for 3 days       Routing to PCP for further review/recommendations/orders      Ryann Harrison RN, BSN  [facility]

## 2022-01-18 NOTE — TELEPHONE ENCOUNTER
Called and spoke with patient.     Advised of change in losartan. She was taking two 25 mg tablets. So will just take 1 now. Advised patient she is due for follow-up in May with cardiology. Patient stated an understanding and agreed with plan.     Lynn Vera RN  [facility]

## 2022-01-18 NOTE — TELEPHONE ENCOUNTER
Form faxed to [facility] @ # [phone].     Original sent to be scanned.     Patrice Stewart

## 2022-01-26 DIAGNOSIS — F51.04 CHRONIC INSOMNIA: ICD-10-CM

## 2022-01-26 RX ORDER — QUETIAPINE 150 MG/1
300 TABLET, FILM COATED, EXTENDED RELEASE ORAL AT BEDTIME
Qty: 15 TABLET | Refills: 0 | Status: SHIPPED | OUTPATIENT
Start: 2022-01-26 | End: 2022-02-02

## 2022-01-26 NOTE — TELEPHONE ENCOUNTER
Pt S-(situation): Pt calling with request to refill medicaiton    B-(background): Pt reports taking 2 Seroquel at night. Pt noted has been taking 2 since receiving the Rx.    A-(assessment): Pt question who told her to take two. Pt noted she just started taking 2 without provider direction, noted she sleeps better while taking two.     Per micromedex: Generalized anxiety disorder, Monotherapy    (Extended-release tablet) 50 to 300 mg/day orally (mean dosage approximately 165 mg/day) [8] or fixed dosage of 50, 150, or 300 mg/day orally [9][8], begin with 50 mg/day, with increase to 150 mg/day on day 3 and to 300 mg/day on day 5 depending on target dose (off-label dosage)  Pt reports only having one dose left.     R-(recommendations): Writer advised should schedule visit with Nayeli to discuss dose increase, advised will send note to PCP to review and advise as well.    Future Appointments   Date Time Provider Department Center   2/1/2022  1:00 PM Nayeli Castorena PA-C [facility]    2/2/2022  2:00 PM Jin Ackerman MD [facility] Joshua N, RN   [facility]

## 2022-01-26 NOTE — TELEPHONE ENCOUNTER
This medication is managed by Dr. Ackerman.  Will route to him to advise.      Nayeli Castorena MBA, MS, PA-C

## 2022-01-27 ENCOUNTER — TELEPHONE (OUTPATIENT)
Dept: FAMILY MEDICINE | Facility: CLINIC | Age: 77
End: 2022-01-27
Payer: COMMERCIAL

## 2022-01-27 NOTE — TELEPHONE ENCOUNTER
Prior Authorization Retail Medication Request    Medication/Dose: Hydroxyzine  ICD code (if different than what is on RX):    Previously Tried and Failed:    Rationale:      Insurance Name:  In Chart  Insurance ID:        Pharmacy Information (if different than what is on RX)  Name:       [facility] - [place], [place] - [address]    Phone:      Covermymeds Key: [id number]    Plan does not cover. Please change RX or advise to start a PA.     Patrice Stewart

## 2022-01-27 NOTE — TELEPHONE ENCOUNTER
Reason for Call:  Form, our goal is to have forms completed with 72 hours, however, some forms may require a visit or additional information.    Type of letter, form or note:  Prior Auth    Who is the form from?: [employer] (if other please explain)    Where did the form come from: form was faxed in    What clinic location was the form placed at?: [facility]    Where the form was placed: Nayeli Castorena Box/Folder    What number is listed as a contact on the form?: No number found on form       Additional comments: [email or web address]    Call taken on 1/27/2022 at 5:27 PM by Yuliana Rayo

## 2022-02-02 ENCOUNTER — VIRTUAL VISIT (OUTPATIENT)
Dept: ADDICTION MEDICINE | Facility: CLINIC | Age: 77
End: 2022-02-02
Payer: COMMERCIAL

## 2022-02-02 DIAGNOSIS — F51.04 CHRONIC INSOMNIA: ICD-10-CM

## 2022-02-02 DIAGNOSIS — F10.20 ALCOHOL USE DISORDER, SEVERE, DEPENDENCE (H): Primary | ICD-10-CM

## 2022-02-02 DIAGNOSIS — F33.2 SEVERE EPISODE OF RECURRENT MAJOR DEPRESSIVE DISORDER, WITHOUT PSYCHOTIC FEATURES (H): ICD-10-CM

## 2022-02-02 PROCEDURE — 99215 OFFICE O/P EST HI 40 MIN: CPT | Mod: 95 | Performed by: FAMILY MEDICINE

## 2022-02-02 RX ORDER — QUETIAPINE 150 MG/1
300 TABLET, FILM COATED, EXTENDED RELEASE ORAL AT BEDTIME
Qty: 60 TABLET | Refills: 1 | Status: SHIPPED | OUTPATIENT
Start: 2022-02-02 | End: 2022-06-17

## 2022-02-02 ASSESSMENT — ANXIETY QUESTIONNAIRES
GAD7 TOTAL SCORE: 8
5. BEING SO RESTLESS THAT IT IS HARD TO SIT STILL: NOT AT ALL
2. NOT BEING ABLE TO STOP OR CONTROL WORRYING: SEVERAL DAYS
7. FEELING AFRAID AS IF SOMETHING AWFUL MIGHT HAPPEN: SEVERAL DAYS
6. BECOMING EASILY ANNOYED OR IRRITABLE: SEVERAL DAYS
3. WORRYING TOO MUCH ABOUT DIFFERENT THINGS: SEVERAL DAYS
1. FEELING NERVOUS, ANXIOUS, OR ON EDGE: NEARLY EVERY DAY
GAD7 TOTAL SCORE: 8
7. FEELING AFRAID AS IF SOMETHING AWFUL MIGHT HAPPEN: SEVERAL DAYS
GAD7 TOTAL SCORE: 8
4. TROUBLE RELAXING: SEVERAL DAYS

## 2022-02-02 ASSESSMENT — PATIENT HEALTH QUESTIONNAIRE - PHQ9
10. IF YOU CHECKED OFF ANY PROBLEMS, HOW DIFFICULT HAVE THESE PROBLEMS MADE IT FOR YOU TO DO YOUR WORK, TAKE CARE OF THINGS AT HOME, OR GET ALONG WITH OTHER PEOPLE: VERY DIFFICULT
SUM OF ALL RESPONSES TO PHQ QUESTIONS 1-9: 13
SUM OF ALL RESPONSES TO PHQ QUESTIONS 1-9: 13

## 2022-02-02 NOTE — TELEPHONE ENCOUNTER
Central Prior Authorization Team   Phone: [phone]    PA Initiation    Medication: hydroxyzine  Insurance Company: [employer] - Phone [phone] Fax [phone]  Pharmacy Filling the Rx: [id number] - [place] - [address] AT [place] [address]  Filling Pharmacy Phone: [phone]  Filling Pharmacy Fax:    Start Date: 2/2/2022

## 2022-02-02 NOTE — PROGRESS NOTES
[facility]    A/P                                                    ASSESSMENT/PLAN  Diagnoses and all orders for this visit:  Alcohol use disorder, severe, dependence (H)  -     Adult Mental Health  Referral; Future  Chronic insomnia  -     QUEtiapine (SEROQUEL XR) 150 MG TB24 24 hr tablet; Take 2 tablets (300 mg) by mouth At Bedtime  Severe episode of recurrent major depressive disorder, without psychotic features (H)  -     Adult Mental Health  Referral; Future  -     Adult Mental Health  Referral; Future    Orders Placed This Encounter   Medications     QUEtiapine (SEROQUEL XR) 150 MG TB24 24 hr tablet     Sig: Take 2 tablets (300 mg) by mouth At Bedtime     Dispense:  60 tablet     Refill:  1       Problem list updated Feb 2, 2022   No problems updated.      Feb 2, 2022  - Encouraged her to take campral as prescribed, consistently on schedule, to help reduce cravings and amount of alcohol consumed  - Will call to follow-up regarding her success in stopping/reducing alcohol use  - Recommended ED visit given reports of withdrawal symptoms; she jignesh consider this  - referrals placed for  and treatment resources      Last encounter A/P  Sept 29, 2021  - Agree with restarting mirtazepine. Has taken this in conjuction with other SSRIs in the past; warned of risk of serotonin syndrome and she indicated understanding. Low risk with previous history of this combination. Mirtazepine will likely be helpful through the winter, when her mood often dips  - Continue with seroquel; history of concern for SE, however her sleep has been chronically poor and, as we discussed risks including metabolic syndrome and tardive dyskinesia, we agreed that the benefits are appropriately outweighing these risks, especially as she continues to struggle with avoiding alcohol entirely, and insomnia contributes to her risk for alcohol use  - Will re-evaluate with each visit as  appropriate  - Continue campral for craving relief  - Proivded trial of diclofenac; advised she work with her PCP and/or pain provider(s) to work on a plan for NSAID mgmt for chronic pain      PDMP Review       Value Time User    State PDMP site checked  Yes 2/2/2022  2:04 PM Jin Ackerman MD            RTC  Return in 1 month (on 3/2/2022) for 2pm, in person.      Counseled the patient on the importance of having a recovery program in addition to medication to manage recovery.  Components include avoiding isolating, having willingness to change, avoiding triggers and managing cravings. Encouraged having some type of sober network and practicing honesty with trusted support person(s). Encouraged other services such as counseling, 12 step or other self-help organizations.      Opioid warning reviewed.  Risk of overdose following a period of abstinence due to decrease tolerance was discussed including risk of death.  Strongly recommended abstain from alcohol, benzodiazepines, THC, opioids and other drugs of abuse.  Increased risk of return to opioid use after use of these substances discussed.  Increased risk of overdose/death with use of other substances particularly benzodiazepines/alcohol reviewed.        SUBJECTIVE                                                    Kavitha Headley is a 76 year old female who presents to clinic today for follow up    Visit performed Virtual, via telephone    Time spent on phone call: 35 minutes      PHQ-9 Score:   PHQ 3/3/2021 1/13/2022 2/2/2022   PHQ-9 Total Score 8 13 13   Q9: Thoughts of better off dead/self-harm past 2 weeks Not at all Not at all Not at all       NITHIN-7 Score:  NITHIN-7 SCORE 3/3/2021 1/13/2022 2/2/2022   Total Score - - -   Total Score - - 8 (mild anxiety)   Total Score 2 8 8         Recent HPI Details:  HPI Sep 29, 2021  - Suffers from chronic pain concerns. Was in [place] and tried a medication that is not approved for use in the [place]. Asking for an  "alternative if possible  - Ongoing cravings intermittently, not much better or worse, but the acamprosate is helpful to better tolerate these and avoid alcohol  - Reports \"a couple slips\" in recent months.  notes these are often precipitated by significant back pain  - Notes she was completely abstinent while in [place]. Returned to use after an episode of difficult communication with her son, which was disappointing  - Has used seroquel in the past for sleep. She stopped this in 2018 for fear of long-term side-effects, including tardive dyskinesia, which was mentioned        TODAY'S VISIT  HPI Feb 2, 2022  - Hospitalized in December for almost 2 weeks for shortness of breath caused by influenza A  - Mood was somewhat low earlier in December, and her activity levels have dropped since her hospitalization, which has worsened a depression  - Feeling low, not interested in doing things, laying on the couch, and apathetic generally  - Has been taking 300mg seroquel every night. No over-sedation, no SE concerns. Also taking trazodone, melatonin, valerian root, and L-tryotophan to help with her sleep  - Has been trying to get to AA meetings, and generally getting out to see family (including sister and daughter)  - Currently drinking more, 2-3 glasses of demetrice, about 3 shots per glass  - Taking Campral daily, TID, \"I feel it's counter-productive to take it when I'm drinking\". She stops drinking on the weekends when her partner is with her, which is always more stimulating for her. Stops most days of the week.      OBJECTIVE                                                    PHYSICAL EXAM:  There were no vitals taken for this visit.    GENERAL: healthy, alert and no distress  RESP: No respiratory distress  MENTAL STATUS EXAM  Appearance/Behavior: No appearant distress  Speech: Normal  Mood/Affect: depressed affect  Insight: Fair    LAB  No results found for any visits on 02/02/22.      HISTORY                       "                              Problem list reviewed & adjusted, as indicated.  Patient Active Problem List   Diagnosis     Spinal stenosis     Benign essential hypertension     Chronic insomnia     Alcohol use disorder, severe, in early remission (H)     CKD (chronic kidney disease) stage 3, GFR 30-59 ml/min (H)     Knee joint replacement status     Chronic pain syndrome     Bariatric surgery status     Personal history of urinary calculi     Anemia, unspecified type     Anxiety     Vitamin B12 deficiency (non anemic)     Alcohol use disorder, severe, dependence (H)     Liver disease, chronic, due to alcohol (H)     Coronary artery disease involving native coronary artery of native heart without angina pectoris     Mild ascending aorta dilatation (H)     Psoriasis - on Humira - Dr. Gauri Clark with dermatology     Mild major depression (H)     Thiamine deficiency     Herpes simplex virus infection     Cold sore     Gastroesophageal reflux disease with esophagitis - chronic due to alcohol     Hyponatremia     Elevated brain natriuretic peptide (BNP) level     Enlarged pulmonary artery (H)     Acute respiratory failure with hypoxia (H)     Reactive airway disease     Influenza A     Chronic diastolic (congestive) heart failure (H)         MEDICATION LIST (prior to visit)  acamprosate (CAMPRAL) 333 MG EC tablet, Take 2 tablets (666 mg) by mouth 3 times daily  acetaminophen (TYLENOL) 500 MG tablet, Take 1,000 mg by mouth 2 times daily as needed for pain  albuterol (PROAIR HFA/PROVENTIL HFA/VENTOLIN HFA) 108 (90 Base) MCG/ACT inhaler, Inhale 2 puffs into the lungs every 6 hours as needed for wheezing  aspirin 81 MG EC tablet, Take 81 mg by mouth daily  atenolol (TENORMIN) 25 MG tablet, Take 1 tablet (25 mg) by mouth daily  benzonatate (TESSALON) 100 MG capsule, Take 1 capsule (100 mg) by mouth 3 times daily as needed for cough  citalopram (CELEXA) 20 MG tablet, Take 1 tablet (20 mg) by mouth daily  diclofenac (VOLTAREN)  50 MG EC tablet, Take 1 tablet (50 mg) by mouth 2 times daily  folic acid (FOLVITE) 1 MG tablet, Take 1 mg by mouth daily  furosemide (LASIX) 20 MG tablet, TAKE 1 TABLET DAILY  gabapentin (NEURONTIN) 300 MG capsule, Take 1 capsule (300 mg) by mouth 3 times daily  hydrOXYzine (ATARAX) 25 MG tablet, Take 1-2 tablets (25-50 mg) by mouth every 6 hours as needed for anxiety or other (sleep)  ipratropium - albuterol 0.5 mg/2.5 mg/3 mL (DUONEB) 0.5-2.5 (3) MG/3ML neb solution, Take 1 vial (3 mLs) by nebulization 4 times daily  losartan (COZAAR) 25 MG tablet, Take 1 tablet (25 mg) by mouth daily  methocarbamol (ROBAXIN) 500 MG tablet, Take 500 mg by mouth 2 times daily  mirtazapine (REMERON) 15 MG tablet, Take 1 tablet (15 mg) by mouth At Bedtime  mometasone-formoterol (DULERA) 200-5 MCG/ACT inhaler, Inhale 2 puffs into the lungs 2 times daily  Multiple Vitamins-Minerals (CENTRUM SILVER) per tablet, Take 1 tablet by mouth daily  polyethylene glycol (MIRALAX) 17 GM/Dose powder, Take 17 g by mouth 2 times daily as needed for constipation  pravastatin (PRAVACHOL) 20 MG tablet, Take 1 tablet (20 mg) by mouth daily  QUEtiapine (SEROQUEL) 50 MG tablet, Take 1 tablet (50 mg) by mouth nightly as needed (Anxiety, insomnia, hallucinations)  senna-docusate (SENOKOT-S/PERICOLACE) 8.6-50 MG tablet, Take 1 tablet by mouth 2 times daily  traZODone (DESYREL) 100 MG tablet, TAKE 1 TABLET NIGHTLY AS NEEDED FOR SLEEP  valACYclovir (VALTREX) 1000 mg tablet, Take 2 tablets (2,000 mg) by mouth 2 times daily    No current facility-administered medications on file prior to visit.      MEDICATION LIST (after visit)  Current Outpatient Medications   Medication     acamprosate (CAMPRAL) 333 MG EC tablet     acetaminophen (TYLENOL) 500 MG tablet     albuterol (PROAIR HFA/PROVENTIL HFA/VENTOLIN HFA) 108 (90 Base) MCG/ACT inhaler     aspirin 81 MG EC tablet     atenolol (TENORMIN) 25 MG tablet     benzonatate (TESSALON) 100 MG capsule     citalopram  (CELEXA) 20 MG tablet     diclofenac (VOLTAREN) 50 MG EC tablet     folic acid (FOLVITE) 1 MG tablet     furosemide (LASIX) 20 MG tablet     gabapentin (NEURONTIN) 300 MG capsule     hydrOXYzine (ATARAX) 25 MG tablet     ipratropium - albuterol 0.5 mg/2.5 mg/3 mL (DUONEB) 0.5-2.5 (3) MG/3ML neb solution     losartan (COZAAR) 25 MG tablet     methocarbamol (ROBAXIN) 500 MG tablet     mirtazapine (REMERON) 15 MG tablet     mometasone-formoterol (DULERA) 200-5 MCG/ACT inhaler     Multiple Vitamins-Minerals (CENTRUM SILVER) per tablet     polyethylene glycol (MIRALAX) 17 GM/Dose powder     pravastatin (PRAVACHOL) 20 MG tablet     QUEtiapine (SEROQUEL XR) 150 MG TB24 24 hr tablet     QUEtiapine (SEROQUEL) 50 MG tablet     senna-docusate (SENOKOT-S/PERICOLACE) 8.6-50 MG tablet     traZODone (DESYREL) 100 MG tablet     valACYclovir (VALTREX) 1000 mg tablet     No current facility-administered medications for this visit.         Allergies   Allergen Reactions     Bactrim [Sulfamethoxazole W/Trimethoprim] Hives     Codeine Itching     NAUSEA     Morphine Itching     NAUSEA       Additional MDM Details:    40 minutes spent on the date of the encounter doing chart review, history and exam, documentation and further activities per the note      Jin Ackerman MD  [facility]  [phone]

## 2022-02-02 NOTE — PROGRESS NOTES
Linda is a 76 year old who is being evaluated via a billable telephone visit.      What phone number would you like to be contacted at? [phone]  How would you like to obtain your AVS? Mail a copy    Answers for HPI/ROS submitted by the patient on 2/2/2022  If you checked off any problems, how difficult have these problems made it for you to do your work, take care of things at home, or get along with other people?: Very difficult  PHQ9 TOTAL SCORE: 13  NITHIN 7 TOTAL SCORE: 8

## 2022-02-03 ASSESSMENT — ANXIETY QUESTIONNAIRES: GAD7 TOTAL SCORE: 8

## 2022-02-03 ASSESSMENT — PATIENT HEALTH QUESTIONNAIRE - PHQ9: SUM OF ALL RESPONSES TO PHQ QUESTIONS 1-9: 13

## 2022-02-03 NOTE — TELEPHONE ENCOUNTER
Prior Authorization Approval    Authorization Effective Date: 1/3/2022  Authorization Expiration Date: 2/2/2023  Medication: hydroxyzine  Approved Dose/Quantity:   Reference #:     Insurance Company: [employer] - Phone [phone] Fax [phone]  Expected CoPay:       CoPay Card Available:      Foundation Assistance Needed:    Which Pharmacy is filling the prescription (Not needed for infusion/clinic administered): [id number] - [place], [place] - [address] AT [place] [address]  Pharmacy Notified: Yes  Patient Notified: Yes          Pharmacy has been notified of approval and will contact patient when medication is ready for pickup.

## 2022-02-08 ENCOUNTER — VIRTUAL VISIT (OUTPATIENT)
Dept: FAMILY MEDICINE | Facility: CLINIC | Age: 77
End: 2022-02-08
Payer: COMMERCIAL

## 2022-02-08 DIAGNOSIS — F10.21 ALCOHOL DEPENDENCE IN REMISSION (H): ICD-10-CM

## 2022-02-08 DIAGNOSIS — F51.04 CHRONIC INSOMNIA: ICD-10-CM

## 2022-02-08 DIAGNOSIS — F41.9 ANXIETY: ICD-10-CM

## 2022-02-08 DIAGNOSIS — J96.01 ACUTE RESPIRATORY FAILURE WITH HYPOXIA (H): ICD-10-CM

## 2022-02-08 DIAGNOSIS — K70.9 LIVER DISEASE, CHRONIC, DUE TO ALCOHOL (H): ICD-10-CM

## 2022-02-08 DIAGNOSIS — I25.10 CORONARY ARTERY DISEASE INVOLVING NATIVE CORONARY ARTERY OF NATIVE HEART WITHOUT ANGINA PECTORIS: ICD-10-CM

## 2022-02-08 DIAGNOSIS — N18.31 STAGE 3A CHRONIC KIDNEY DISEASE (H): ICD-10-CM

## 2022-02-08 DIAGNOSIS — F10.21 ALCOHOL USE DISORDER, SEVERE, IN EARLY REMISSION (H): ICD-10-CM

## 2022-02-08 DIAGNOSIS — F33.0 MILD RECURRENT MAJOR DEPRESSION (H): ICD-10-CM

## 2022-02-08 DIAGNOSIS — F32.0 MILD MAJOR DEPRESSION (H): ICD-10-CM

## 2022-02-08 DIAGNOSIS — I50.32 CHRONIC DIASTOLIC (CONGESTIVE) HEART FAILURE (H): ICD-10-CM

## 2022-02-08 DIAGNOSIS — G89.4 CHRONIC PAIN SYNDROME: ICD-10-CM

## 2022-02-08 DIAGNOSIS — I77.810 MILD ASCENDING AORTA DILATATION (H): Primary | ICD-10-CM

## 2022-02-08 DIAGNOSIS — I10 BENIGN ESSENTIAL HYPERTENSION: ICD-10-CM

## 2022-02-08 PROBLEM — J10.1 INFLUENZA A: Status: RESOLVED | Noted: 2021-12-29 | Resolved: 2022-02-08

## 2022-02-08 PROBLEM — F10.20 ALCOHOL DEPENDENCE (H): Status: ACTIVE | Noted: 2022-02-08

## 2022-02-08 PROBLEM — F10.20 ALCOHOL USE DISORDER, SEVERE, DEPENDENCE (H): Status: RESOLVED | Noted: 2018-02-12 | Resolved: 2022-02-08

## 2022-02-08 PROBLEM — F32.9 MAJOR DEPRESSION: Status: RESOLVED | Noted: 2019-12-04 | Resolved: 2022-02-08

## 2022-02-08 PROBLEM — B00.9 HERPES SIMPLEX VIRUS INFECTION: Status: RESOLVED | Noted: 2019-12-04 | Resolved: 2022-02-08

## 2022-02-08 PROBLEM — E87.1 HYPONATREMIA: Status: RESOLVED | Noted: 2021-12-17 | Resolved: 2022-02-08

## 2022-02-08 PROCEDURE — 96127 BRIEF EMOTIONAL/BEHAV ASSMT: CPT | Mod: 95 | Performed by: PHYSICIAN ASSISTANT

## 2022-02-08 PROCEDURE — 99214 OFFICE O/P EST MOD 30 MIN: CPT | Mod: 95 | Performed by: PHYSICIAN ASSISTANT

## 2022-02-08 RX ORDER — ALBUTEROL SULFATE 90 UG/1
2 AEROSOL, METERED RESPIRATORY (INHALATION) EVERY 6 HOURS PRN
Qty: 18 G | Refills: 0 | Status: CANCELLED | OUTPATIENT
Start: 2022-02-08

## 2022-02-08 RX ORDER — CITALOPRAM HYDROBROMIDE 20 MG/1
20 TABLET ORAL DAILY
Qty: 90 TABLET | Refills: 1 | Status: SHIPPED | OUTPATIENT
Start: 2022-02-08 | End: 2022-07-15

## 2022-02-08 RX ORDER — GABAPENTIN 300 MG/1
300 CAPSULE ORAL 3 TIMES DAILY
Qty: 270 CAPSULE | Refills: 1 | Status: SHIPPED | OUTPATIENT
Start: 2022-02-08 | End: 2022-08-08

## 2022-02-08 RX ORDER — METHOCARBAMOL 500 MG/1
500 TABLET, FILM COATED ORAL 2 TIMES DAILY
Qty: 180 TABLET | Refills: 1 | Status: SHIPPED | OUTPATIENT
Start: 2022-02-08 | End: 2022-08-08

## 2022-02-08 RX ORDER — HYDROXYZINE HYDROCHLORIDE 25 MG/1
25-50 TABLET, FILM COATED ORAL EVERY 6 HOURS PRN
Qty: 60 TABLET | Refills: 0 | Status: CANCELLED | OUTPATIENT
Start: 2022-02-08

## 2022-02-08 RX ORDER — QUETIAPINE FUMARATE 50 MG/1
50 TABLET, FILM COATED ORAL
Qty: 30 TABLET | Refills: 8 | Status: CANCELLED | OUTPATIENT
Start: 2022-02-08

## 2022-02-08 RX ORDER — MIRTAZAPINE 15 MG/1
15 TABLET, FILM COATED ORAL AT BEDTIME
Qty: 30 TABLET | Refills: 3 | Status: CANCELLED | OUTPATIENT
Start: 2022-02-08

## 2022-02-08 RX ORDER — PRAVASTATIN SODIUM 20 MG
20 TABLET ORAL DAILY
Qty: 90 TABLET | Refills: 1 | Status: SHIPPED | OUTPATIENT
Start: 2022-02-08 | End: 2023-02-08

## 2022-02-08 RX ORDER — ACAMPROSATE CALCIUM 333 MG/1
666 TABLET, DELAYED RELEASE ORAL 3 TIMES DAILY
Qty: 180 TABLET | Refills: 3 | Status: CANCELLED | OUTPATIENT
Start: 2022-02-08

## 2022-02-08 ASSESSMENT — ANXIETY QUESTIONNAIRES
6. BECOMING EASILY ANNOYED OR IRRITABLE: SEVERAL DAYS
1. FEELING NERVOUS, ANXIOUS, OR ON EDGE: SEVERAL DAYS
5. BEING SO RESTLESS THAT IT IS HARD TO SIT STILL: NOT AT ALL
2. NOT BEING ABLE TO STOP OR CONTROL WORRYING: NOT AT ALL
GAD7 TOTAL SCORE: 3
IF YOU CHECKED OFF ANY PROBLEMS ON THIS QUESTIONNAIRE, HOW DIFFICULT HAVE THESE PROBLEMS MADE IT FOR YOU TO DO YOUR WORK, TAKE CARE OF THINGS AT HOME, OR GET ALONG WITH OTHER PEOPLE: SOMEWHAT DIFFICULT
7. FEELING AFRAID AS IF SOMETHING AWFUL MIGHT HAPPEN: NOT AT ALL
3. WORRYING TOO MUCH ABOUT DIFFERENT THINGS: SEVERAL DAYS

## 2022-02-08 ASSESSMENT — ASTHMA QUESTIONNAIRES: ACT_TOTALSCORE: 21

## 2022-02-08 ASSESSMENT — PATIENT HEALTH QUESTIONNAIRE - PHQ9
SUM OF ALL RESPONSES TO PHQ QUESTIONS 1-9: 4
5. POOR APPETITE OR OVEREATING: NOT AT ALL

## 2022-02-08 NOTE — TELEPHONE ENCOUNTER
Patient requesting refill of medications.  I have filled those that I am managing.  Will defer these to Dr. Ackerman.      Nayeli Castorena MBA, MS, PA-C

## 2022-02-08 NOTE — PROGRESS NOTES
Linda is a 76 year old who is being evaluated via a billable video visit.      How would you like to obtain your AVS? Mail a copy  If the video visit is dropped, the invitation should be resent by: Text to cell phone: [phone] DOXIMITY  Will anyone else be joining your video visit? No  {If patient encounters technical issues they should call [phone] :[id number]}    Video Start Time: 3:51 PM    Assessment & Plan     Mild ascending aorta dilatation (H)  ***    Chronic diastolic (congestive) heart failure (H)  ***    Coronary artery disease involving native coronary artery of native heart without angina pectoris  ***  - pravastatin (PRAVACHOL) 20 MG tablet  Dispense: 90 tablet; Refill: 1    Alcohol dependence in remission (H)  ***    Alcohol use disorder, severe, in early remission (H)  ***    Liver disease, chronic, due to alcohol (H)  ***    Acute respiratory failure with hypoxia (H) - 12/2021 - due to influenza A - persistent issues, ? underlying RAD - seeing [facility] - Dr. Ventura  ***    Mild recurrent major depression (H)  ***    Anxiety  ***  - citalopram (CELEXA) 20 MG tablet  Dispense: 90 tablet; Refill: 1    Chronic insomnia  ***    Chronic pain syndrome  ***  - methocarbamol (ROBAXIN) 500 MG tablet  Dispense: 180 tablet; Refill: 1  - gabapentin (NEURONTIN) 300 MG capsule  Dispense: 270 capsule; Refill: 1  - diclofenac (VOLTAREN) 50 MG EC tablet  Dispense: 90 tablet; Refill: 1    Benign essential hypertension  ***    Stage 3a chronic kidney disease (H)  ***      {[facility] 2021 Documentation (Optional):359547}  {2021 E&M time (Optional):686045}  {Provider  Link to [facility] Help Grid :621231}     {FOLLOW UP PLANS (Optional) Includes COVID19 Treatment Plan:754288}    No follow-ups on file.    Nayeli Castorena PA-C  [facility]   Linda is a 76 year old who presents for the following health issues     HPI     Depression/anxiety/EtOH  Patient reports that she continues to remain sober  "from alcohol, however, in review of Dr. Ackerman's note she was drinking 2 to 3 glasses of demetrice containing 3 shots per glass daily.  She is taking Campral 3 times daily.  She does not drink on the weekends when her partner is with her because this is more stimulating for her..  She last saw Dr. Ackerman with addiction medicine via telemedicine on 2/2/2022.  At that time she reported worsened depression but states that she feels quite good overall with me today.  Is taking Seroquel  mg at night and occasionally a 50 mg immediate release middle of the night if she is waking up.  She does have the Seroquel XR prescribed as 300 mg nightly but is not always using that dose.  She does have a consultation with a new psychiatrist at [facility] 2/16/2022 (Dr. Nicholson).  She was previously seen there but her psychiatrist that was Medicare certified left the practice and they did not have anyone that held that certification so she had to get care elsewhere.  She is hoping to find out about any possible medication adjustments that may improve her mood as well as hopefully establish with a therapist there.  She is not currently in any formal program and has not been successful in getting to AA.    Of note, her son recently went into a treatment program for alcohol.  He is 45 years old.  She states that this was a \"reality check \"for her as she always envied his ability to drink without recourse.    How are you doing with your depression since your last visit?  Improved    How are you doing with your anxiety since your last visit?  Improved    Are you having other symptoms that might be associated with depression or anxiety? No    Have you had a significant life event? No     Do you have any concerns with your use of alcohol or other drugs? No     Chronic pain  - Suffers from chronic pain concerns - back, hips, hands/feet - \"lots of arthritis\". Was in [place] and tried a medication that is not approved for use in " the [place]. Asking for an alternative if possible      [facility]iatry appointment at [facility] - 2/16.  Dr. Nicholson.  New provider.  Previously was seen there but had to leave due to insurance issues.  Hoping to get into     Social History     Tobacco Use     Smoking status: Never Smoker     Smokeless tobacco: Never Used   Substance Use Topics     Alcohol use: Yes     Alcohol/week: 63.0 standard drinks     Types: 63 Standard drinks or equivalent per week     Comment: 6 drinks daily     Drug use: No     PHQ 1/13/2022 2/2/2022 2/8/2022   PHQ-9 Total Score 13 13 4   Q9: Thoughts of better off dead/self-harm past 2 weeks Not at all Not at all Not at all     NITHIN-7 SCORE 1/13/2022 2/2/2022 2/8/2022   Total Score - - -   Total Score - 8 (mild anxiety) -   Total Score 8 8 3     {Last PHQ9 or GAD7 Responses (Optional):884173}    Suicide Assessment Five-step Evaluation and Treatment (SAFE-T)  {Provider  Link to Depression Care Package SmartSet :131807}  Asthma Follow-Up    Was ACT completed today?    Yes    ACT Total Scores 2/8/2022   ACT TOTAL SCORE (Goal Greater than or Equal to 20) 21   In the past 12 months, how many times did you visit the emergency room for your asthma without being admitted to the hospital? 0   In the past 12 months, how many times were you hospitalized overnight because of your asthma? 0          How many days per week do you miss taking your asthma controller medication?  Has not been using Dulera- has not needed -- using Neb 1x per day. Pulmonologist see's next week    Please describe any recent triggers for your asthma: Patient is unaware of triggers    Have you had any Emergency Room Visits, Urgent Care Visits, or Hospital Admissions since your last office visit?  No    Chronic/Recurring Back Pain Follow Up      Where is your back pain located? (Select all that apply) low back left, middle of back left and knees    How would you describe your back pain?  Dull pain gets worse to cramp-ming  "horse    Where does your back pain spread? the left buttock    Since your last clinic visit for back pain, how has your pain changed? Better since ablation    Does your back pain interfere with your job? Not applicable    Since your last visit, have you tried any new treatment? Yes -  Surgery      {additonal problems for provider to add (Optional):217693}    Review of Systems   {ROS COMP (Optional):200233}      Objective           Vitals:  No vitals were obtained today due to virtual visit.    Physical Exam   {video visit exam brief selected:556314::\"GENERAL: Healthy, alert and no distress\",\"EYES: Eyes grossly normal to inspection.  No discharge or erythema, or obvious scleral/conjunctival abnormalities.\",\"RESP: No audible wheeze, cough, or visible cyanosis.  No visible retractions or increased work of breathing.  \",\"SKIN: Visible skin clear. No significant rash, abnormal pigmentation or lesions.\",\"NEURO: Cranial nerves grossly intact.  Mentation and speech appropriate for age.\",\"PSYCH: Mentation appears normal, affect normal/bright, judgement and insight intact, normal speech and appearance well-groomed.\"}    {Diagnostic Test Results (Optional):506221}    {AMBULATORY ATTESTATION (Optional):666391}        Video-Visit Details    Type of service:  Video Visit    Video End Time:{video visit start/end time for provider to select:029890}    Originating Location (pt. Location): {video visit patient location:935493::\"Home\"}    Distant Location (provider location):  [facility]     Platform used for Video Visit: {Virtual Visit Platforms:445171::\"[employer]\"}  "

## 2022-02-08 NOTE — PROGRESS NOTES
Linda is a 76 year old who is being evaluated via a billable telephone visit.      What phone number would you like to be contacted at? [phone]  How would you like to obtain your AVS? MyChart    Assessment & Plan     Mild ascending aorta dilatation (H)  Chronic diastolic (congestive) heart failure (H)  Coronary artery disease involving native coronary artery of native heart without angina pectoris  Benign essential hypertension  Stage 3a chronic kidney disease (H)  Continue current blood pressure regimen.  Plan to follow-up with Dr. Ugarte may have 2022  - pravastatin (PRAVACHOL) 20 MG tablet  Dispense: 90 tablet; Refill: 1    Alcohol dependence in remission (H)  Alcohol use disorder, severe, in early remission (H)  Liver disease, chronic, due to alcohol (H)  Patient reports sobriety to me over the phone today, however, in review of Dr. Ackerman's note from 2/2/2022 she admitted consuming 2-3 alcoholic beverages daily each comprised of 3 shots of demetrice.  I did reiterate the importance of sobriety during our conversation but did not challenge her significantly on her claims of sobriety.  She will ask the new psychiatrist at [facility] to keep me in the loop with their treatment regimen.    Acute respiratory failure with hypoxia (H) - 12/2021 - due to influenza A - persistent issues, ? underlying RAD - seeing [facility] - Dr. Ventura  Markedly improved.  Likely post viral inflammation.  Has inhalers and nebulizers on hand should she need them.  Will follow up with Dr. Ventura in pulmonology next week and keep me apprised of their recommendations.    Mild recurrent major depression (H)  Anxiety  Stable.  Continue current regimen.  - citalopram (CELEXA) 20 MG tablet  Dispense: 90 tablet; Refill: 1    Chronic insomnia  Chronic pain syndrome  Stable.  Refilled today.  - methocarbamol (ROBAXIN) 500 MG tablet  Dispense: 180 tablet; Refill: 1  - gabapentin (NEURONTIN) 300 MG capsule  Dispense: 270 capsule; Refill: 1  -  "diclofenac (VOLTAREN) 50 MG EC tablet  Dispense: 90 tablet; Refill: 1      No follow-ups on file.    Nayeli Castorena PA-C  [facility]    Sole Mckeon is a 76 year old who presents for the following health issues     HPI       Depression/anxiety/EtOH  Patient reports that she continues to remain sober from alcohol, however, in review of Dr. Ackerman's note she was drinking 2 to 3 glasses of demetrice containing 3 shots per glass daily.  She is taking Campral 3 times daily.  She does not drink on the weekends when her partner is with her because this is more stimulating for her..  She last saw Dr. Ackerman with addiction medicine via telemedicine on 2/2/2022.  At that time she reported worsened depression but states that she feels quite good overall with me today.  Is taking Seroquel  mg at night and occasionally a 50 mg immediate release middle of the night if she is waking up.  She does have the Seroquel XR prescribed as 300 mg nightly but is not always using that dose.  She does have a consultation with a new psychiatrist at [facility] 2/16/2022 (Dr. Nicholson).  She was previously seen there but her psychiatrist that was Medicare certified left the practice and they did not have anyone that held that certification so she had to get care elsewhere.  She is hoping to find out about any possible medication adjustments that may improve her mood as well as hopefully establish with a therapist there.  She is not currently in any formal program and has not been successful in getting to .    Of note, the patient was referred to a 55+ mental health program by Dr. Ackerman and she has a virtual visit with them Tuesday, 2/15/2022.  She also has a follow-up appointment scheduled with Dr. Ackerman 3/2/2022.    Of note, her son recently went into a treatment program for alcohol.  He is 45 years old.  She states that this was a \"reality check \"for her as she always envied his ability to " "drink without recourse.    How are you doing with your depression since your last visit?  Improved    How are you doing with your anxiety since your last visit?  Improved    Are you having other symptoms that might be associated with depression or anxiety? No    Have you had a significant life event? No     Do you have any concerns with your use of alcohol or other drugs? No     PHQ 1/13/2022 2/2/2022 2/8/2022   PHQ-9 Total Score 13 13 4   Q9: Thoughts of better off dead/self-harm past 2 weeks Not at all Not at all Not at all     NITHIN-7 SCORE 1/13/2022 2/2/2022 2/8/2022   Total Score - - -   Total Score - 8 (mild anxiety) -   Total Score 8 8 3     Acute respiratory failure/shortness of breath/suspected reactive airway disease  Patient reports that her breathing has markedly improved over the course of the last 2 weeks.  She was hospitalized in December for most 2 weeks for acute respiratory failure from influenza A.  She states that she has no longer using her albuterol rescue inhaler or her nebulizer.  Additionally, she has discontinued the use of her mometasone/formoterol (Dulera) inhaler.  She is not waking at night with shortness of breath.  She does have a follow-up appointment with Dr. Ventura of [facility] Tuesday, 2/15/2022    ACT Total Scores 1/13/2022 2/8/2022   ACT TOTAL SCORE (Goal Greater than or Equal to 20) 9 21   In the past 12 months, how many times did you visit the emergency room for your asthma without being admitted to the hospital? 0 0   In the past 12 months, how many times were you hospitalized overnight because of your asthma? 2 0     .  Chronic pain  - Suffers from chronic pain concerns - back, hips, hands/feet - \"lots of arthritis\".  Is using gabapentin 3 mg 3 times daily and was started on a trial of diclofenac 50 mg twice daily as needed.  She states that this is quite helpful.  She would like a refill of this today.  Additionally, she uses methocarbamol 500 mg twice daily and would like " a refill of this.    Chronic diastolic (congestive) heart failure  Benign essential hypertension  Linda was decreased from losartan 50 mg to 25 mg at her last visit 1/13/2022 as she was hypotensive.  It was increased during her hospitalization due to elevated blood pressures.  Of note, there was a mild decrease in her right ventricular function when compared to early December 2021.  I did reach out to Dr. Ugarte who recommended continuing with the original plan of follow-up in her clinic May 2022.      Review of Systems   Constitutional, HEENT, cardiovascular, pulmonary, GI, , musculoskeletal, neuro, skin, endocrine and psych systems are negative, except as otherwise noted.      Objective           Vitals:  No vitals were obtained today due to virtual visit.    Physical Exam   healthy, alert and no distress  PSYCH: Alert and oriented times 3; coherent speech, normal   rate and volume, able to articulate logical thoughts, able   to abstract reason, no tangential thoughts, no hallucinations   or delusions  Her affect is normal  RESP: No cough, no audible wheezing, able to talk in full sentences  Remainder of exam unable to be completed due to telephone visits          Phone call duration: 18 minutes

## 2022-02-09 RX ORDER — TRAZODONE HYDROCHLORIDE 100 MG/1
TABLET ORAL
Qty: 30 TABLET | Refills: 1 | Status: SHIPPED | OUTPATIENT
Start: 2022-02-09 | End: 2022-07-15

## 2022-02-09 RX ORDER — HYDROXYZINE HYDROCHLORIDE 25 MG/1
25-50 TABLET, FILM COATED ORAL EVERY 6 HOURS PRN
Qty: 60 TABLET | Refills: 0 | Status: SHIPPED | OUTPATIENT
Start: 2022-02-09 | End: 2022-03-24

## 2022-02-09 RX ORDER — MIRTAZAPINE 15 MG/1
15 TABLET, FILM COATED ORAL AT BEDTIME
Qty: 30 TABLET | Refills: 1 | Status: SHIPPED | OUTPATIENT
Start: 2022-02-09 | End: 2022-07-15

## 2022-02-09 RX ORDER — ACAMPROSATE CALCIUM 333 MG/1
666 TABLET, DELAYED RELEASE ORAL 3 TIMES DAILY
Qty: 180 TABLET | Refills: 1 | Status: SHIPPED | OUTPATIENT
Start: 2022-02-09 | End: 2022-07-07

## 2022-02-09 ASSESSMENT — ANXIETY QUESTIONNAIRES: GAD7 TOTAL SCORE: 3

## 2022-02-09 NOTE — CONFIDENTIAL NOTE
Called, LVM.    Need to follow-up regarding any recurrent withdrawal symptoms. At our last visit, she was having withdrawals by the end of the weekend; she drinks throughout the week, reporting around 10 standard drinks at the most, using alcohol every night. Then she is with her partner on the weekends and has no desire to drink, prefers not to drink, but is anxious to get home on Sunday because she is feeling shaky and uncomfortable by then.    We reviewed medications at her last visit. I did not specifically review remeron with her. She does continue to use trazodone and seroquel, though her dose of seroquel changed so I cancelled this refill request for 50mg. I am open to her using hydroxyzine, though we did not review that medicaiton either.    Sent refills for 1mo supply, as she will be seeing [facility] for psychiatry in the near future.    Asked Linda to call back and confirm if she continues to take remeron and hydroxyzine, and to provide an update to our nursing staff regarding her current withdrawal symptoms; may be appropriate for her to present for withdrawal management at the hospital. She has an intake setup for next week to enroll with IOP.    Jin Ackerman MD

## 2022-02-11 ENCOUNTER — TELEPHONE (OUTPATIENT)
Dept: BEHAVIORAL HEALTH | Facility: CLINIC | Age: 77
End: 2022-02-11
Payer: COMMERCIAL

## 2022-02-17 ENCOUNTER — TELEPHONE (OUTPATIENT)
Dept: ADDICTION MEDICINE | Facility: CLINIC | Age: 77
End: 2022-02-17
Payer: COMMERCIAL

## 2022-02-17 NOTE — CONFIDENTIAL NOTE
Called Linda twice last night and once this morning, 2/18/2022. No answer, LVM asking her to call back. Would like to discuss directly with her, asked her to call back and speak with staff about a good time for a callback.    Jin Ackerman MD

## 2022-02-17 NOTE — TELEPHONE ENCOUNTER
Writer reached out to patient whom reported she is experiencing jerk type movements and shaky at times. Patient feels like this is caused due to the Seroquel she is prescribed.     QUEtiapine (SEROQUEL XR) 150 MG TB24 24 hr tablet 60 tablet 1 2/2/2022  No   Sig - Route: Take 2 tablets (300 mg) by mouth At Bedtime - Oral     Patient then reported she discontinued taking this RX on Tuesday 02/17/2022 and started taking 50 MG Seroquel tablets she has. Patient reports taking  if she full dose of Campral as prescribed, however, continues to drink nightly after 7 PM 1-2 glasses of Corin. Patient is inquiring about a medication to help with the side effects that she believes Seroquel is causing. Patient does not have an appointment with provider until 03/02/22, therefore will route message to provider to advise further.     Yue Issa RN on 2/17/2022 at 4:20 PM

## 2022-02-17 NOTE — TELEPHONE ENCOUNTER
Pt called intake hoping to reach Dr. Ackerman or a team member. Pt reported that they are having a hard time staying sober. Pt would like a call back as soon as possible. [phone]. Ok to call anytime and ok to leave a detailed message if need be.

## 2022-02-18 NOTE — TELEPHONE ENCOUNTER
Pt called stating she had missed a call from Dr Ackerman.  She would like him to try her again please.

## 2022-02-18 NOTE — TELEPHONE ENCOUNTER
Attempted to call patient to encourage her to be evaluated in the [facility] ED per Dr. Ackerman's recommendations; no answer, no voicemail. If patient calls back, transfer to RN.    Per chart, Dr Ackerman placed a referral for long-term psychiatry on 2/2/22. [facility] attempted to reach patient to schedule without success; letter was sent.     Veena Luke RN on 2/18/2022 at 1:32 PM

## 2022-02-18 NOTE — CONFIDENTIAL NOTE
Spoke with Dr. Augustus Turner, psychiatry.    As TD is rare with seroquel, he has low suspicion of this side-effect causing her symptoms.    We agreed that other medical causes are more likely, including electrolyte changes that may be caused by her medications or alcohol use.    Please call patient, recommend that she be evaluated at [facility] ED for medical work-up. If they decide she is medically cleared, she can see psychiatry at the [facility] unit. I discussed this with her, and she indicated she would be open to [facility]. I did not discuss medical evaluation with her, and have not called her since my conversation with Dr. Turner.    If she is unwilling to go to the ED, I can prescribe ativan over the weekend for symptom control. She can continue 50mg seroquel.    Jin Ackerman MD

## 2022-02-18 NOTE — CONFIDENTIAL NOTE
Reports twitching in her hands and face about 3-4 times per day. Lasts a few minutes. Feels this in her cheeks, and notices some abnormal movements of her mouth as well. Hands are generally shaky. Denies any tongue movements. No sweats, no n/v, headache    Reports less alcohol use since our last appt, 1-2 times per week, and reported 1-2 brandys when drinking, 3 shots each. Denies any recurrent withdrawal symptoms.    Jin Ackerman MD

## 2022-03-02 ENCOUNTER — TELEPHONE (OUTPATIENT)
Dept: FAMILY MEDICINE | Facility: CLINIC | Age: 77
End: 2022-03-02
Payer: COMMERCIAL

## 2022-03-02 ENCOUNTER — MEDICAL CORRESPONDENCE (OUTPATIENT)
Dept: HEALTH INFORMATION MANAGEMENT | Facility: CLINIC | Age: 77
End: 2022-03-02
Payer: COMMERCIAL

## 2022-03-02 NOTE — TELEPHONE ENCOUNTER
Reason for Call:  Form, our goal is to have forms completed with 72 hours, however, some forms may require a visit or additional information.    Type of letter, form or note:  medical    Who is the form from?: Home care    Where did the form come from: form was faxed in    What clinic location was the form placed at?: [facility]    Where the form was placed: Nayeli Castorena Box/Folder    What number is listed as a contact on the form?: [phone]       Additional comments:     Call taken on 3/2/2022 at 9:09 AM by Merlene Mcmahon

## 2022-03-03 NOTE — TELEPHONE ENCOUNTER
[facility]  at [phone].   Originals sent to be scanned.       Essence Elizondo

## 2022-03-10 ENCOUNTER — HOSPITAL ENCOUNTER (INPATIENT)
Facility: CLINIC | Age: 77
LOS: 6 days | Discharge: NURSING, REHAB OR LONG-TERM CARE | DRG: 897 | End: 2022-03-16
Attending: EMERGENCY MEDICINE | Admitting: STUDENT IN AN ORGANIZED HEALTH CARE EDUCATION/TRAINING PROGRAM
Payer: COMMERCIAL

## 2022-03-10 DIAGNOSIS — N30.00 ACUTE CYSTITIS WITHOUT HEMATURIA: ICD-10-CM

## 2022-03-10 DIAGNOSIS — I35.0 SEVERE AORTIC STENOSIS: Primary | ICD-10-CM

## 2022-03-10 DIAGNOSIS — F10.930 ALCOHOL WITHDRAWAL SYNDROME WITHOUT COMPLICATION (H): ICD-10-CM

## 2022-03-10 DIAGNOSIS — I10 BENIGN ESSENTIAL HYPERTENSION: ICD-10-CM

## 2022-03-10 DIAGNOSIS — M62.82 NON-TRAUMATIC RHABDOMYOLYSIS: ICD-10-CM

## 2022-03-10 LAB
ALBUMIN SERPL-MCNC: 3 G/DL (ref 3.4–5)
ALP SERPL-CCNC: 111 U/L (ref 40–150)
ALT SERPL W P-5'-P-CCNC: 23 U/L (ref 0–50)
ANION GAP SERPL CALCULATED.3IONS-SCNC: 14 MMOL/L (ref 3–14)
AST SERPL W P-5'-P-CCNC: 41 U/L (ref 0–45)
ATRIAL RATE - MUSE: 92 BPM
BASOPHILS # BLD AUTO: 0.1 10E3/UL (ref 0–0.2)
BASOPHILS NFR BLD AUTO: 0 %
BILIRUB SERPL-MCNC: 0.7 MG/DL (ref 0.2–1.3)
BUN SERPL-MCNC: 11 MG/DL (ref 7–30)
CALCIUM SERPL-MCNC: 9 MG/DL (ref 8.5–10.1)
CHLORIDE BLD-SCNC: 93 MMOL/L (ref 94–109)
CK SERPL-CCNC: 460 U/L (ref 30–225)
CO2 SERPL-SCNC: 25 MMOL/L (ref 20–32)
CREAT SERPL-MCNC: 0.9 MG/DL (ref 0.52–1.04)
D DIMER PPP FEU-MCNC: 0.65 UG/ML FEU (ref 0–0.5)
DIASTOLIC BLOOD PRESSURE - MUSE: NORMAL MMHG
EOSINOPHIL # BLD AUTO: 0.1 10E3/UL (ref 0–0.7)
EOSINOPHIL NFR BLD AUTO: 0 %
ERYTHROCYTE [DISTWIDTH] IN BLOOD BY AUTOMATED COUNT: 14.2 % (ref 10–15)
ETHANOL SERPL-MCNC: <0.01 G/DL
GFR SERPL CREATININE-BSD FRML MDRD: 65 ML/MIN/1.73M2
GLUCOSE BLD-MCNC: 146 MG/DL (ref 70–99)
GLUCOSE BLDC GLUCOMTR-MCNC: 134 MG/DL (ref 70–99)
HCT VFR BLD AUTO: 33.4 % (ref 35–47)
HGB BLD-MCNC: 11.1 G/DL (ref 11.7–15.7)
HOLD SPECIMEN: NORMAL
IMM GRANULOCYTES # BLD: 0.1 10E3/UL
IMM GRANULOCYTES NFR BLD: 0 %
INTERPRETATION ECG - MUSE: NORMAL
LIPASE SERPL-CCNC: 202 U/L (ref 73–393)
LYMPHOCYTES # BLD AUTO: 0.9 10E3/UL (ref 0.8–5.3)
LYMPHOCYTES NFR BLD AUTO: 7 %
MAGNESIUM SERPL-MCNC: 1.5 MG/DL (ref 1.6–2.3)
MAGNESIUM SERPL-MCNC: 1.6 MG/DL (ref 1.6–2.3)
MCH RBC QN AUTO: 29 PG (ref 26.5–33)
MCHC RBC AUTO-ENTMCNC: 33.2 G/DL (ref 31.5–36.5)
MCV RBC AUTO: 87 FL (ref 78–100)
MONOCYTES # BLD AUTO: 1.5 10E3/UL (ref 0–1.3)
MONOCYTES NFR BLD AUTO: 11 %
NEUTROPHILS # BLD AUTO: 11.1 10E3/UL (ref 1.6–8.3)
NEUTROPHILS NFR BLD AUTO: 82 %
NRBC # BLD AUTO: 0 10E3/UL
NRBC BLD AUTO-RTO: 0 /100
NT-PROBNP SERPL-MCNC: 1712 PG/ML (ref 0–1800)
P AXIS - MUSE: 34 DEGREES
PHOSPHATE SERPL-MCNC: 2.1 MG/DL (ref 2.5–4.5)
PLAT MORPH BLD: ABNORMAL
PLATELET # BLD AUTO: ABNORMAL 10*3/UL
POTASSIUM BLD-SCNC: 3.5 MMOL/L (ref 3.4–5.3)
PR INTERVAL - MUSE: 110 MS
PROT SERPL-MCNC: 6.8 G/DL (ref 6.8–8.8)
QRS DURATION - MUSE: 62 MS
QT - MUSE: 432 MS
QTC - MUSE: 534 MS
R AXIS - MUSE: -23 DEGREES
RBC # BLD AUTO: 3.83 10E6/UL (ref 3.8–5.2)
RBC MORPH BLD: ABNORMAL
SARS-COV-2 RNA RESP QL NAA+PROBE: NEGATIVE
SODIUM SERPL-SCNC: 132 MMOL/L (ref 133–144)
SYSTOLIC BLOOD PRESSURE - MUSE: NORMAL MMHG
T AXIS - MUSE: 66 DEGREES
TROPONIN I SERPL HS-MCNC: 15 NG/L
VENTRICULAR RATE- MUSE: 92 BPM
WBC # BLD AUTO: 13.6 10E3/UL (ref 4–11)

## 2022-03-10 PROCEDURE — 258N000003 HC RX IP 258 OP 636: Performed by: STUDENT IN AN ORGANIZED HEALTH CARE EDUCATION/TRAINING PROGRAM

## 2022-03-10 PROCEDURE — 36415 COLL VENOUS BLD VENIPUNCTURE: CPT | Performed by: STUDENT IN AN ORGANIZED HEALTH CARE EDUCATION/TRAINING PROGRAM

## 2022-03-10 PROCEDURE — 84100 ASSAY OF PHOSPHORUS: CPT | Performed by: STUDENT IN AN ORGANIZED HEALTH CARE EDUCATION/TRAINING PROGRAM

## 2022-03-10 PROCEDURE — 84484 ASSAY OF TROPONIN QUANT: CPT | Performed by: EMERGENCY MEDICINE

## 2022-03-10 PROCEDURE — 82550 ASSAY OF CK (CPK): CPT | Performed by: EMERGENCY MEDICINE

## 2022-03-10 PROCEDURE — 99285 EMERGENCY DEPT VISIT HI MDM: CPT

## 2022-03-10 PROCEDURE — 250N000013 HC RX MED GY IP 250 OP 250 PS 637: Performed by: STUDENT IN AN ORGANIZED HEALTH CARE EDUCATION/TRAINING PROGRAM

## 2022-03-10 PROCEDURE — 87635 SARS-COV-2 COVID-19 AMP PRB: CPT | Performed by: EMERGENCY MEDICINE

## 2022-03-10 PROCEDURE — 83690 ASSAY OF LIPASE: CPT | Performed by: EMERGENCY MEDICINE

## 2022-03-10 PROCEDURE — 250N000011 HC RX IP 250 OP 636: Performed by: EMERGENCY MEDICINE

## 2022-03-10 PROCEDURE — 82077 ASSAY SPEC XCP UR&BREATH IA: CPT | Performed by: EMERGENCY MEDICINE

## 2022-03-10 PROCEDURE — 36415 COLL VENOUS BLD VENIPUNCTURE: CPT | Performed by: EMERGENCY MEDICINE

## 2022-03-10 PROCEDURE — 85379 FIBRIN DEGRADATION QUANT: CPT | Performed by: STUDENT IN AN ORGANIZED HEALTH CARE EDUCATION/TRAINING PROGRAM

## 2022-03-10 PROCEDURE — 96374 THER/PROPH/DIAG INJ IV PUSH: CPT

## 2022-03-10 PROCEDURE — 96361 HYDRATE IV INFUSION ADD-ON: CPT

## 2022-03-10 PROCEDURE — 85004 AUTOMATED DIFF WBC COUNT: CPT | Performed by: EMERGENCY MEDICINE

## 2022-03-10 PROCEDURE — C9803 HOPD COVID-19 SPEC COLLECT: HCPCS

## 2022-03-10 PROCEDURE — 258N000003 HC RX IP 258 OP 636: Performed by: EMERGENCY MEDICINE

## 2022-03-10 PROCEDURE — 83735 ASSAY OF MAGNESIUM: CPT | Performed by: STUDENT IN AN ORGANIZED HEALTH CARE EDUCATION/TRAINING PROGRAM

## 2022-03-10 PROCEDURE — 83880 ASSAY OF NATRIURETIC PEPTIDE: CPT | Performed by: STUDENT IN AN ORGANIZED HEALTH CARE EDUCATION/TRAINING PROGRAM

## 2022-03-10 PROCEDURE — 80053 COMPREHEN METABOLIC PANEL: CPT | Performed by: EMERGENCY MEDICINE

## 2022-03-10 PROCEDURE — HZ2ZZZZ DETOXIFICATION SERVICES FOR SUBSTANCE ABUSE TREATMENT: ICD-10-PCS | Performed by: EMERGENCY MEDICINE

## 2022-03-10 PROCEDURE — 96376 TX/PRO/DX INJ SAME DRUG ADON: CPT

## 2022-03-10 PROCEDURE — 250N000013 HC RX MED GY IP 250 OP 250 PS 637: Performed by: EMERGENCY MEDICINE

## 2022-03-10 PROCEDURE — 99223 1ST HOSP IP/OBS HIGH 75: CPT | Mod: AI | Performed by: STUDENT IN AN ORGANIZED HEALTH CARE EDUCATION/TRAINING PROGRAM

## 2022-03-10 PROCEDURE — 82040 ASSAY OF SERUM ALBUMIN: CPT | Performed by: EMERGENCY MEDICINE

## 2022-03-10 PROCEDURE — 120N000001 HC R&B MED SURG/OB

## 2022-03-10 RX ORDER — ALBUTEROL SULFATE 90 UG/1
2 AEROSOL, METERED RESPIRATORY (INHALATION) EVERY 6 HOURS PRN
Status: DISCONTINUED | OUTPATIENT
Start: 2022-03-10 | End: 2022-03-16 | Stop reason: HOSPADM

## 2022-03-10 RX ORDER — FOLIC ACID 1 MG/1
1 TABLET ORAL DAILY
Status: DISCONTINUED | OUTPATIENT
Start: 2022-03-11 | End: 2022-03-16 | Stop reason: HOSPADM

## 2022-03-10 RX ORDER — ONDANSETRON 2 MG/ML
4 INJECTION INTRAMUSCULAR; INTRAVENOUS EVERY 6 HOURS PRN
Status: DISCONTINUED | OUTPATIENT
Start: 2022-03-10 | End: 2022-03-16 | Stop reason: HOSPADM

## 2022-03-10 RX ORDER — ACETAMINOPHEN 650 MG/1
650 SUPPOSITORY RECTAL EVERY 6 HOURS PRN
Status: DISCONTINUED | OUTPATIENT
Start: 2022-03-10 | End: 2022-03-16 | Stop reason: HOSPADM

## 2022-03-10 RX ORDER — HYDROXYZINE HYDROCHLORIDE 25 MG/1
25-50 TABLET, FILM COATED ORAL EVERY 6 HOURS PRN
Status: DISCONTINUED | OUTPATIENT
Start: 2022-03-10 | End: 2022-03-16 | Stop reason: HOSPADM

## 2022-03-10 RX ORDER — GABAPENTIN 300 MG/1
300 CAPSULE ORAL 3 TIMES DAILY
Status: DISCONTINUED | OUTPATIENT
Start: 2022-03-10 | End: 2022-03-16 | Stop reason: HOSPADM

## 2022-03-10 RX ORDER — MULTIPLE VITAMINS W/ MINERALS TAB 9MG-400MCG
1 TAB ORAL DAILY
Status: DISCONTINUED | OUTPATIENT
Start: 2022-03-11 | End: 2022-03-16 | Stop reason: HOSPADM

## 2022-03-10 RX ORDER — LIDOCAINE 40 MG/G
CREAM TOPICAL
Status: DISCONTINUED | OUTPATIENT
Start: 2022-03-10 | End: 2022-03-16 | Stop reason: HOSPADM

## 2022-03-10 RX ORDER — MAGNESIUM OXIDE 400 MG/1
800 TABLET ORAL ONCE
Status: COMPLETED | OUTPATIENT
Start: 2022-03-10 | End: 2022-03-10

## 2022-03-10 RX ORDER — LORAZEPAM 2 MG/ML
1-2 INJECTION INTRAMUSCULAR EVERY 30 MIN PRN
Status: DISCONTINUED | OUTPATIENT
Start: 2022-03-10 | End: 2022-03-16 | Stop reason: HOSPADM

## 2022-03-10 RX ORDER — QUETIAPINE FUMARATE 25 MG/1
50 TABLET, FILM COATED ORAL
Status: DISCONTINUED | OUTPATIENT
Start: 2022-03-10 | End: 2022-03-16 | Stop reason: HOSPADM

## 2022-03-10 RX ORDER — LORAZEPAM 1 MG/1
1-2 TABLET ORAL EVERY 30 MIN PRN
Status: DISCONTINUED | OUTPATIENT
Start: 2022-03-10 | End: 2022-03-16 | Stop reason: HOSPADM

## 2022-03-10 RX ORDER — PRAVASTATIN SODIUM 20 MG
20 TABLET ORAL DAILY
Status: DISCONTINUED | OUTPATIENT
Start: 2022-03-10 | End: 2022-03-10

## 2022-03-10 RX ORDER — MULTIVITAMIN,THERAPEUTIC
1 TABLET ORAL ONCE
Status: COMPLETED | OUTPATIENT
Start: 2022-03-10 | End: 2022-03-10

## 2022-03-10 RX ORDER — LORAZEPAM 2 MG/ML
2 INJECTION INTRAMUSCULAR ONCE
Status: COMPLETED | OUTPATIENT
Start: 2022-03-10 | End: 2022-03-10

## 2022-03-10 RX ORDER — TRAZODONE HYDROCHLORIDE 100 MG/1
100 TABLET ORAL
Status: DISCONTINUED | OUTPATIENT
Start: 2022-03-10 | End: 2022-03-16 | Stop reason: HOSPADM

## 2022-03-10 RX ORDER — ONDANSETRON 4 MG/1
4 TABLET, ORALLY DISINTEGRATING ORAL EVERY 6 HOURS PRN
Status: DISCONTINUED | OUTPATIENT
Start: 2022-03-10 | End: 2022-03-16 | Stop reason: HOSPADM

## 2022-03-10 RX ORDER — IPRATROPIUM BROMIDE AND ALBUTEROL SULFATE 2.5; .5 MG/3ML; MG/3ML
3 SOLUTION RESPIRATORY (INHALATION) EVERY 4 HOURS PRN
Status: DISCONTINUED | OUTPATIENT
Start: 2022-03-10 | End: 2022-03-16 | Stop reason: HOSPADM

## 2022-03-10 RX ORDER — CITALOPRAM HYDROBROMIDE 10 MG/1
20 TABLET ORAL DAILY
Status: DISCONTINUED | OUTPATIENT
Start: 2022-03-10 | End: 2022-03-16 | Stop reason: HOSPADM

## 2022-03-10 RX ORDER — ACETAMINOPHEN 325 MG/1
650 TABLET ORAL EVERY 6 HOURS PRN
Status: DISCONTINUED | OUTPATIENT
Start: 2022-03-10 | End: 2022-03-16 | Stop reason: HOSPADM

## 2022-03-10 RX ORDER — FLUMAZENIL 0.1 MG/ML
0.2 INJECTION, SOLUTION INTRAVENOUS
Status: DISCONTINUED | OUTPATIENT
Start: 2022-03-10 | End: 2022-03-16 | Stop reason: HOSPADM

## 2022-03-10 RX ORDER — HALOPERIDOL 5 MG/ML
2.5-5 INJECTION INTRAMUSCULAR EVERY 6 HOURS PRN
Status: DISCONTINUED | OUTPATIENT
Start: 2022-03-10 | End: 2022-03-16 | Stop reason: HOSPADM

## 2022-03-10 RX ORDER — OLANZAPINE 5 MG/1
5-10 TABLET, ORALLY DISINTEGRATING ORAL EVERY 6 HOURS PRN
Status: DISCONTINUED | OUTPATIENT
Start: 2022-03-10 | End: 2022-03-16 | Stop reason: HOSPADM

## 2022-03-10 RX ORDER — BENZONATATE 100 MG/1
100 CAPSULE ORAL 3 TIMES DAILY PRN
Status: ON HOLD | COMMUNITY
End: 2022-03-16

## 2022-03-10 RX ORDER — ATENOLOL 25 MG/1
25 TABLET ORAL DAILY
Status: DISCONTINUED | OUTPATIENT
Start: 2022-03-10 | End: 2022-03-16 | Stop reason: HOSPADM

## 2022-03-10 RX ORDER — FOLIC ACID 1 MG/1
1 TABLET ORAL ONCE
Status: COMPLETED | OUTPATIENT
Start: 2022-03-10 | End: 2022-03-10

## 2022-03-10 RX ORDER — MIRTAZAPINE 15 MG/1
15 TABLET, FILM COATED ORAL AT BEDTIME
Status: DISCONTINUED | OUTPATIENT
Start: 2022-03-10 | End: 2022-03-16 | Stop reason: HOSPADM

## 2022-03-10 RX ORDER — SODIUM CHLORIDE 9 MG/ML
INJECTION, SOLUTION INTRAVENOUS CONTINUOUS
Status: DISCONTINUED | OUTPATIENT
Start: 2022-03-10 | End: 2022-03-11

## 2022-03-10 RX ORDER — QUETIAPINE 300 MG/1
300 TABLET, FILM COATED, EXTENDED RELEASE ORAL AT BEDTIME
Status: DISCONTINUED | OUTPATIENT
Start: 2022-03-10 | End: 2022-03-16 | Stop reason: HOSPADM

## 2022-03-10 RX ORDER — ASPIRIN 81 MG/1
81 TABLET ORAL DAILY
Status: DISCONTINUED | OUTPATIENT
Start: 2022-03-10 | End: 2022-03-16 | Stop reason: HOSPADM

## 2022-03-10 RX ADMIN — THIAMINE HCL TAB 100 MG 100 MG: 100 TAB at 16:20

## 2022-03-10 RX ADMIN — SODIUM CHLORIDE: 9 INJECTION, SOLUTION INTRAVENOUS at 20:07

## 2022-03-10 RX ADMIN — LORAZEPAM 2 MG: 2 INJECTION INTRAMUSCULAR; INTRAVENOUS at 15:06

## 2022-03-10 RX ADMIN — LORAZEPAM 1 MG: 1 TABLET ORAL at 21:49

## 2022-03-10 RX ADMIN — SODIUM CHLORIDE 1000 ML: 9 INJECTION, SOLUTION INTRAVENOUS at 16:22

## 2022-03-10 RX ADMIN — THERA TABS 1 TABLET: TAB at 16:21

## 2022-03-10 RX ADMIN — Medication 800 MG: at 16:22

## 2022-03-10 RX ADMIN — LORAZEPAM 2 MG: 2 INJECTION INTRAMUSCULAR; INTRAVENOUS at 16:37

## 2022-03-10 RX ADMIN — GABAPENTIN 300 MG: 300 CAPSULE ORAL at 21:47

## 2022-03-10 RX ADMIN — ASPIRIN 81 MG: 81 TABLET, COATED ORAL at 20:14

## 2022-03-10 RX ADMIN — CITALOPRAM HYDROBROMIDE 20 MG: 10 TABLET ORAL at 20:15

## 2022-03-10 RX ADMIN — FOLIC ACID 1 MG: 1 TABLET ORAL at 16:20

## 2022-03-10 RX ADMIN — FLUTICASONE FUROATE AND VILANTEROL TRIFENATATE 1 PUFF: 200; 25 POWDER RESPIRATORY (INHALATION) at 21:52

## 2022-03-10 RX ADMIN — QUETIAPINE FUMARATE 300 MG: 300 TABLET, EXTENDED RELEASE ORAL at 21:47

## 2022-03-10 RX ADMIN — ATENOLOL 25 MG: 25 TABLET ORAL at 20:14

## 2022-03-10 RX ADMIN — MIRTAZAPINE 15 MG: 15 TABLET, FILM COATED ORAL at 21:47

## 2022-03-10 RX ADMIN — SODIUM CHLORIDE 1000 ML: 9 INJECTION, SOLUTION INTRAVENOUS at 15:07

## 2022-03-10 RX ADMIN — PRAVASTATIN SODIUM 20 MG: 20 TABLET ORAL at 20:14

## 2022-03-10 ASSESSMENT — ENCOUNTER SYMPTOMS
VOMITING: 0
NAUSEA: 1
HEADACHES: 0
NERVOUS/ANXIOUS: 1
SHORTNESS OF BREATH: 1
CHEST TIGHTNESS: 1

## 2022-03-10 ASSESSMENT — ACTIVITIES OF DAILY LIVING (ADL)
ADLS_ACUITY_SCORE: 8
ADLS_ACUITY_SCORE: 10
ADLS_ACUITY_SCORE: 10
ADLS_ACUITY_SCORE: 8
ADLS_ACUITY_SCORE: 10

## 2022-03-10 NOTE — H&P
[facility]    History and Physical - Hospitalist Service       Date of Admission:  3/10/2022    Assessment & Plan      Kavitha Headley is a 76 year old female with past medical history significatnt for alcohol use disorder admitted on 3/10/2022 after a mechanical fall, She is found to be in alcohol withdrawal.     Mechanical Fall   Elevated CK  The patient reports that two days ago she got up to use the bathroom, slipped and fell. She was apparently unable to get up on her own but was able to crawl into the other room to get her phone to call EMS. She does have scattered bruising/scrapes on her knees and elbows. Her CK is mildly elevated to 460. No other apparent injuries   - Continue IV fluids   - Monitor CK   - PT/OT consultation     Alcohol use disorder   Alcohol withdrawal   Pt tachycardic and tremulous on arrival to the ED. She usually has about 1 pint of demetrice daily. Her last drink was reportedly two days ago prior to her fall. She continues to be tremulous.  - CIWA with PRN ativan   - Thimain/folate multivitamin   - Chem Dep consult     Dyspnea   ?Hx of asthma/COPD  Pt notes some shortness of breath. O2 sats are stable on room air, but she is clearly dyspneic on examination. She is on Dulera, albuterol and duonebs PTA so presumably has some underlying asthma/COPD, but this is not clearly documented int he medical record.   - Will get CXR now  - Check BNP and D-Dimer   If BNP elevated would stop fluids and consider TTE tomorrow. If D-Dimer is elevated would obtain CT-PE study  - Continue PTA dulera, albuterol, duonebs PRN     Hyponatremia: Na = 132 mmol/L (Ref range: 133 - 144 mmol/L) on admission, will monitor as appropriate    Hypomagnesemia   - Mag replacement oer protocol     Mild leukocytosis   WBC 13.6. no clear signs/symptoms of infection   - Monitor WBC  - Obtaining CXR as noted above   - Check UA     Normocytic Anemia   hgb is 11.1 on admission which is actually higher than  "her baseline of around 8-10. Likely some component of hemoconcentration in the setting of dehydration  - Monitor hemoglobin    Severe aortic stenosis  Diastolic congestive heart failure   Pulmonary HTN    TTE from 2021 showed EF of 60-65% with elevated left ventricular filling pressures. Moderate to severe valvular aortic stenosis. TTE from 12/2021 could not assess aortic valve gradients, but moderate Pulmonary HTN was present.  - If dyspnea does not improve consider repeat TTE tomorrow     Depression, anxiety  Sleep disturbance   Continue PTA Celexa, Remeron, Seroquel, trazodone and hydroxyzine      Hypertension  - Hold PTA losartan, resume PTA atenolol     Hyperlipidemia  - Hold PTA pravachol given elevated CK      CKD Stage 2/3  Cr near 0.8-1 at baseline. Cr 0.90 on admit  - Stable    Obesity: Estimated body mass index is 30.9 kg/m  as calculated from the following:    Height as of this encounter: 1.626 m (5' 4\").    Weight as of this encounter: 81.6 kg (180 lb)    Diet: Regular   DVT Prophylaxis: Pneumatic Compression Devices  [place]elongo Catheter: Not present  Central Lines: None  Cardiac Monitoring: None  Code Status: Full Code       Disposition Plan   Expected Discharge: TBD  Anticipated discharge location:  Awaiting care coordination huddle       The patient's care was discussed with the Patient.    Altagracia Farias  Hospitalist Service  [facility]  Securely message with the Vocera Web Console (learn more here)  Text page via [facility] Paging/Directory         ______________________________________________________________________    Chief Complaint   Fall   History is obtained from the patient    History of Present Illness   Kvaitha Headley is a 76 year old female who presents to the ED after a falll. She reports that she got up to use the bathroom and her foot slipped and she fell. She denies head trauma or loss of consciousness. She reports that this fall happened two days ago and it took " her that long to crawl to the other room to reach her phone. She had bruising and scrapes to her knees and elbows.     She does have a hx of fairly significant alcohol use. She was in the hospital in late December with influenza, she was able to remain sober for a period of time after that hospitalization but started drinking again, and now regularly consumes 4 glasses of demetrice daily (1 pint). She reports her last drink was two days ago prior to falling.     She initially complained of some chest discomfort, but this has resolved. She does note some on-going shortness of breath.     Review of Systems    The 10 point Review of Systems is negative other than noted in the HPI or here.     Past Medical History    I have reviewed this patient's medical history and updated it with pertinent information if needed.   Past Medical History:   Diagnosis Date     Alcohol abuse     Long term alcohol abuse. Abstinenet since October 2019.     Anxiety disorder      Ascending aorta dilatation (H)      Bariatric surgery status 1996?    gastric bypass, [facility] and     Benign hypertension      Chronic insomnia      Chronic pain syndrome     Chronic back and neck pain, chronic pain due to osteoarthritis multiple joints     Coronary artery disease involving native coronary artery of native heart without angina pectoris 10/16/2018    Minimal coronary artery disease on coronary angiogram in 2015.      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     Major depressive disorder, single episode, severe, without mention of psychotic behavior      Mixed hyperlipidemia      Moderate aortic stenosis 05/2014    moderate to severe aortic valve stenosis     Pelvic relaxation disorder     Surgical  intervention for cystocele/rectocele 3,11/2012     Personal history of urinary calculi 6/2006    left ureteral stone,lithotripsy     Psoriasis      PVC (premature ventricular contraction)      Spinal stenosis      Stage III chronic kidney disease (H) 2005     SVT (supraventricular tachycardia) (H)     likely atrial tachycardia       Past Surgical History   I have reviewed this patient's surgical history and updated it with pertinent information if needed.  Past Surgical History:   Procedure Laterality Date     APPENDECTOMY  3/2004    incidental     ARTHRODESIS TOE(S) Right 1/31/2020    Procedure: RIGHT FIRST METATARSAL PHALANGEAL JOINT ARTHRODESIS;  Surgeon: Steven Reyes MD;  Location:  OR     C MEDIASTINOSCOPY W OR WO BIOPSY  2/2008    Videomediastinoscopy and, for mediastinal adenopathy -reactive lymphoid hyperplasia     CARPAL TUNNEL RELEASE RT/LT  10/2010    Carpometacarpal excisional arthroplasty with a fascial autograft and APL suspension sling (51482). 2. Left thumb metacarpophalangeal joint fusion with autologous bone graft (99787). 3. Left endoscopic carpal tunnel release      CHOLECYSTECTOMY, LAPOROSCOPIC  11/2010    Cholecystectomy, Laparoscopic     COLONOSCOPY N/A 9/8/2016    Procedure: COMBINED COLONOSCOPY, SINGLE OR MULTIPLE BIOPSY/POLYPECTOMY BY BIOPSY;  Surgeon: Moe Barlow MD;  Location:  GI     CYSTOCELE REPAIR  11/2012    davinc laparoscopic sacrocolpopexy, enterocele repair, lysis of adhesions, placement of retropubic mid urethral sling, cystoscopy     CYSTOSCOPY, LITHOTRIPSY, COMBINED  6/2006    Left extracorporeal shock wave lithotripsy, cystoscopy, left ureteral stent placement.     CYSTOSCOPY, REMOVE STENT(S), COMBINED  7/2006    Cystoscopy, removal of left ureteral stent, retrograde pyelography, flexible and rigid ureteroscopy and holmium laser lithotripsy, basket removal of stone fragments, ureteral stent placement.      ENDOSCOPIC ULTRASOUND UPPER GASTROINTESTINAL TRACT (GI)  N/A 6/12/2017    Procedure: ENDOSCOPIC ULTRASOUND, ESOPHAGOSCOPY / UPPER GASTROINTESTINAL TRACT (GI);  ENDOSCOPIC ULTRASOUND, ESOPHAGOSCOPY / UPPER GASTROINTESTINAL TRACT (GI);  Surgeon: Parth Graham MD;  Location:  GI     HERNIA REPAIR  4/2012    bilateral augmentation mastopexy, ventral hernia repair, and medial thigh liposuction on 04/06/2012.      HYSTERECTOMY VAGINAL, BILATERAL SALPINGO-OOPHERECTOMY, COMBINED  1998    due to myoma and bleeding     JOINT REPLACEMENT, HIP RT/LT  4/2004    right total hip arthroplasty     LAPAROTOMY, LYSIS ADHESIONS, COMBINED  3/2004    lysis adhesions, ventral hernia repair, appendectomy incidentally     LYMPH NODE BIOPSY  4/2008    right axillary, reactive follicular and paracortical hyperplasia.     MAMMOPLASTY AUGMENTATION BILATERAL  4/2012     REPAIR HAMMER TOE Right 1/31/2020    Procedure: WITH SECOND AND THIRD CLAW TOE RECONSTRUCTION;  Surgeon: Steven Reyes MD;  Location:  OR     REVISE RECONSTRUCTED BREAST  6/7/2012    Left breast capsulotomy.      ZZC GASTRIC BYPASS,OBESE<100CM ARIANNA-EN-Y  1996     ZZC REPAIR OF RECTOCELE  3/2012     ZZC TOTAL KNEE ARTHROPLASTY  12/2005    left        Social History   I have reviewed this patient's social history and updated it with pertinent information if needed.  Social History     Tobacco Use     Smoking status: Never Smoker     Smokeless tobacco: Never Used   Substance Use Topics     Alcohol use: Yes     Alcohol/week: 63.0 standard drinks     Types: 63 Standard drinks or equivalent per week     Comment: 6 drinks daily     Drug use: No       Family History   I have reviewed this patient's family history and updated it with pertinent information if needed.  Family History   Problem Relation Age of Onset     Substance Abuse Father      Cancer Father         throat and lung mets     Diabetes No family hx of      Coronary Artery Disease No family hx of      Cerebrovascular Disease No family hx of        Prior to  Admission Medications   Prior to Admission Medications   Prescriptions Last Dose Informant Patient Reported? Taking?   Multiple Vitamins-Minerals (CENTRUM SILVER) per tablet  Self Yes No   Sig: Take 1 tablet by mouth daily   QUEtiapine (SEROQUEL XR) 150 MG TB24 24 hr tablet   No No   Sig: Take 2 tablets (300 mg) by mouth At Bedtime   QUEtiapine (SEROQUEL) 50 MG tablet  Self No No   Sig: Take 1 tablet (50 mg) by mouth nightly as needed (Anxiety, insomnia, hallucinations)   acamprosate (CAMPRAL) 333 MG EC tablet   No No   Sig: Take 2 tablets (666 mg) by mouth 3 times daily   acetaminophen (TYLENOL) 500 MG tablet  Self Yes No   Sig: Take 1,000 mg by mouth 2 times daily as needed for pain   albuterol (PROAIR HFA/PROVENTIL HFA/VENTOLIN HFA) 108 (90 Base) MCG/ACT inhaler   No No   Sig: Inhale 2 puffs into the lungs every 6 hours as needed for wheezing   aspirin 81 MG EC tablet  Self Yes No   Sig: Take 81 mg by mouth daily   atenolol (TENORMIN) 25 MG tablet  Self Yes No   Sig: Take 1 tablet (25 mg) by mouth daily   citalopram (CELEXA) 20 MG tablet   No No   Sig: Take 1 tablet (20 mg) by mouth daily   diclofenac (VOLTAREN) 50 MG EC tablet   No No   Sig: Take 1 tablet (50 mg) by mouth 2 times daily as needed for moderate pain   folic acid (FOLVITE) 1 MG tablet  Self Yes No   Sig: Take 1 mg by mouth daily   furosemide (LASIX) 20 MG tablet  Self No No   Sig: TAKE 1 TABLET DAILY   gabapentin (NEURONTIN) 300 MG capsule   No No   Sig: Take 1 capsule (300 mg) by mouth 3 times daily   hydrOXYzine (ATARAX) 25 MG tablet   No No   Sig: Take 1-2 tablets (25-50 mg) by mouth every 6 hours as needed for anxiety or other (sleep)   ipratropium - albuterol 0.5 mg/2.5 mg/3 mL (DUONEB) 0.5-2.5 (3) MG/3ML neb solution   No No   Sig: NEBULIZE THE CONTENTS OF ONE VIAL FOUR TIMES A DAY   losartan (COZAAR) 25 MG tablet   No No   Sig: Take 1 tablet (25 mg) by mouth daily   methocarbamol (ROBAXIN) 500 MG tablet   No No   Sig: Take 1 tablet (500 mg)  by mouth 2 times daily   mirtazapine (REMERON) 15 MG tablet   No No   Sig: Take 1 tablet (15 mg) by mouth At Bedtime   mometasone-formoterol (DULERA) 200-5 MCG/ACT inhaler   No No   Sig: Inhale 2 puffs into the lungs 2 times daily   polyethylene glycol (MIRALAX) 17 GM/Dose powder  Self Yes No   Sig: Take 17 g by mouth 2 times daily as needed for constipation   pravastatin (PRAVACHOL) 20 MG tablet   No No   Sig: Take 1 tablet (20 mg) by mouth daily   senna-docusate (SENOKOT-S/PERICOLACE) 8.6-50 MG tablet  Self No No   Sig: Take 1 tablet by mouth 2 times daily   traZODone (DESYREL) 100 MG tablet   No No   Sig: TAKE 1 TABLET NIGHTLY AS NEEDED FOR SLEEP   valACYclovir (VALTREX) 1000 mg tablet   No No   Sig: Take 2 tablets (2,000 mg) by mouth 2 times daily      Facility-Administered Medications: None     Allergies   Allergies   Allergen Reactions     Bactrim [Sulfamethoxazole W/Trimethoprim] Hives     Codeine Itching     NAUSEA     Morphine Itching     NAUSEA       Physical Exam   Vital Signs: Temp: 98  F (36.7  C) Temp src: Tympanic BP: 100/80 Pulse: 102   Resp: 23 SpO2: 96 % O2 Device: None (Room air)    Weight: 180 lbs 0 oz    Constitutional: Awake, alert, cooperative, no apparent distress.  Eyes: Conjunctiva and pupils examined and normal.  HEENT: Dry mucous membranes, normal dentition.  Respiratory: Clear to auscultation bilaterally, no crackles or wheezing.  Cardiovascular: Regular rate and rhythm, normal S1 and S2, and IV/VI systolic murmur noted.  GI: Soft, non-distended, non-tender, normal bowel sounds.  Skin: No rashes, no cyanosis, no edema. Skin tear on left arm   Musculoskeletal: No joint swelling, erythema or tenderness.  Neurologic: Cranial nerves 2-12 intact, normal strength and sensation.  Psychiatric: Alert, oriented to person, place and time, no obvious anxiety or depression.    Data   Data reviewed today: I reviewed all medications, new labs and imaging results over the last 24 hours       Recent Labs    Lab 03/10/22  1454   WBC 13.6*   HGB 11.1*   MCV 87   *   POTASSIUM 3.5   CHLORIDE 93*   CO2 25   BUN 11   CR 0.90   ANIONGAP 14   BIRGIT 9.0   *   ALBUMIN 3.0*   PROTTOTAL 6.8   BILITOTAL 0.7   ALKPHOS 111   ALT 23   AST 41   LIPASE 202     No results found for this or any previous visit (from the past 24 hour(s)).

## 2022-03-10 NOTE — PHARMACY-ADMISSION MEDICATION HISTORY
Pharmacy Medication History  Admission medication history interview status for the 3/10/2022  admission is complete. See EPIC admission navigator for prior to admission medications     Location of Interview: Patient room  Medication history sources: Patient, Surescripts and Care Everywhere    Significant changes made to the medication list:  Changed - Valacyclovir  Added - all supplements    In the past week, patient estimated taking medication this percent of the time: 50-90% due to other      Time spent in this activity: 30 minutes    Prior to Admission medications    Medication Sig Last Dose Taking? Auth Provider   acamprosate (CAMPRAL) 333 MG EC tablet Take 2 tablets (666 mg) by mouth 3 times daily Past Week at Unknown time Yes Jin Ackerman MD   acetaminophen (TYLENOL) 500 MG tablet Take 1,000 mg by mouth 2 times daily as needed for pain Past Week at Unknown time Yes Unknown, Entered By History   albuterol (PROAIR HFA/PROVENTIL HFA/VENTOLIN HFA) 108 (90 Base) MCG/ACT inhaler Inhale 2 puffs into the lungs every 6 hours as needed for wheezing Past Week at Unknown time Yes Nayeli Castorena PA-C   aspirin 81 MG EC tablet Take 81 mg by mouth daily Past Week at Unknown time Yes Unknown, Entered By History   atenolol (TENORMIN) 25 MG tablet Take 1 tablet (25 mg) by mouth daily Past Week at Unknown time Yes Herman Ugarte MD   benzonatate (TESSALON) 100 MG capsule Take 100 mg by mouth 3 times daily as needed for cough Past Week at Unknown time Yes Unknown, Entered By History   citalopram (CELEXA) 20 MG tablet Take 1 tablet (20 mg) by mouth daily Past Week at Unknown time Yes Nayeli Castorena PA-C   diclofenac (VOLTAREN) 50 MG EC tablet Take 1 tablet (50 mg) by mouth 2 times daily as needed for moderate pain Past Week at Unknown time Yes Nayeli Castorena PA-C   folic acid (FOLVITE) 1 MG tablet Take 1 mg by mouth daily Past Week at Unknown time Yes Unknown, Entered By History   furosemide (LASIX)  20 MG tablet TAKE 1 TABLET DAILY Past Week at Unknown time Yes Herman Ugarte MD   gabapentin (NEURONTIN) 300 MG capsule Take 1 capsule (300 mg) by mouth 3 times daily Past Week at Unknown time Yes Nayeli Castorena PA-C   hydrOXYzine (ATARAX) 25 MG tablet Take 1-2 tablets (25-50 mg) by mouth every 6 hours as needed for anxiety or other (sleep) Past Week at Unknown time Yes Jin Ackerman MD   ipratropium - albuterol 0.5 mg/2.5 mg/3 mL (DUONEB) 0.5-2.5 (3) MG/3ML neb solution NEBULIZE THE CONTENTS OF ONE VIAL FOUR TIMES A DAY Past Week at Unknown time Yes Nayeli Castorena PA-C   losartan (COZAAR) 25 MG tablet Take 1 tablet (25 mg) by mouth daily Past Week at Unknown time Yes Nayeli Castorena PA-C   methocarbamol (ROBAXIN) 500 MG tablet Take 1 tablet (500 mg) by mouth 2 times daily Past Week at Unknown time Yes Nayeli Castorena PA-C   mirtazapine (REMERON) 15 MG tablet Take 1 tablet (15 mg) by mouth At Bedtime Past Week at Unknown time Yes Jin Ackerman MD   [facility] NATURAL PRODUCTS PO Take 1 capsule by mouth daily B-glucan capsule Past Week at Unknown time Yes Unknown, Entered By History   MISC NATURAL PRODUCTS PO Take 1 tablet by mouth daily Keto - Ketogenesis tablets Past Week at Unknown time Yes Unknown, Entered By History   MISC NATURAL PRODUCTS PO Take 1 tablet by mouth daily Exipure Homeopathic Past Week at Unknown time Yes Unknown, Entered By History   MISC NATURAL PRODUCTS PO Take 1 tablet by mouth daily L-Tryptophan 1000 mg daily Past Week at Unknown time Yes Unknown, Entered By History   mometasone-formoterol (DULERA) 200-5 MCG/ACT inhaler Inhale 2 puffs into the lungs 2 times daily Past Week at Unknown time Yes Nayeli Castorena PA-C   Multiple Vitamins-Minerals (CENTRUM SILVER) per tablet Take 1 tablet by mouth daily Past Week at Unknown time Yes Reported, Patient   polyethylene glycol (MIRALAX) 17 GM/Dose powder Take 17 g by mouth 2 times daily as needed for constipation  Past Week at Unknown time Yes Unknown, Entered By History   pravastatin (PRAVACHOL) 20 MG tablet Take 1 tablet (20 mg) by mouth daily Past Week at Unknown time Yes Nayeli Castorena PA-C   QUEtiapine (SEROQUEL XR) 150 MG TB24 24 hr tablet Take 2 tablets (300 mg) by mouth At Bedtime Past Week at Unknown time Yes Jin Ackerman MD   QUEtiapine (SEROQUEL) 50 MG tablet Take 1 tablet (50 mg) by mouth nightly as needed (Anxiety, insomnia, hallucinations) Past Week at Unknown time Yes Jin Ackerman MD   senna-docusate (SENOKOT-S/PERICOLACE) 8.6-50 MG tablet Take 1 tablet by mouth 2 times daily Past Week at Unknown time Yes Misael Robles MD   traZODone (DESYREL) 100 MG tablet TAKE 1 TABLET NIGHTLY AS NEEDED FOR SLEEP Past Week at Unknown time Yes Jin Ackerman MD   valACYclovir (VALTREX) 1000 mg tablet Take 2 tablets (2,000 mg) by mouth 2 times daily  Patient taking differently: Take 2,000 mg by mouth 2 times daily as needed  More than a month at Unknown time Yes Nayeli Castorena PA-C       The information provided in this note is only as accurate as the sources available at the time of update(s)

## 2022-03-10 NOTE — ED NOTES
[facility]  ED Nurse Handoff Report    ED Chief complaint: Chest Pain      ED Diagnosis:   Final diagnoses:   Alcohol withdrawal syndrome without complication (H)   Non-traumatic rhabdomyolysis       Code Status: not discussed by ED RN     Allergies:   Allergies   Allergen Reactions     Bactrim [Sulfamethoxazole W/Trimethoprim] Hives     Codeine Itching     NAUSEA     Morphine Itching     NAUSEA       Patient Story: 76F hx etoh abuse. Fell 2 days ago and couldn't get up. She was on the floor until today when she was able to get to her phone   Focused Assessment:  Pt tremulous and tachycardic. In etoh withdrawal. Hadn't eaten in 2 days. Ativan improving symptoms. Pt having some CP and SOB today. Pt tachypneic.   Labs Ordered and Resulted from Time of ED Arrival to Time of ED Departure   COMPREHENSIVE METABOLIC PANEL - Abnormal       Result Value    Sodium 132 (*)     Potassium 3.5      Chloride 93 (*)     Carbon Dioxide (CO2) 25      Anion Gap 14      Urea Nitrogen 11      Creatinine 0.90      Calcium 9.0      Glucose 146 (*)     Alkaline Phosphatase 111      AST 41      ALT 23      Protein Total 6.8      Albumin 3.0 (*)     Bilirubin Total 0.7      GFR Estimate 65     CK TOTAL - Abnormal     (*)    CBC WITH PLATELETS AND DIFFERENTIAL - Abnormal    WBC Count 13.6 (*)     RBC Count 3.83      Hemoglobin 11.1 (*)     Hematocrit 33.4 (*)     MCV 87      MCH 29.0      MCHC 33.2      RDW 14.2      Platelet Count       LIPASE - Normal    Lipase 202     ETHYL ALCOHOL LEVEL - Normal    Alcohol ethyl <0.01     TROPONIN I   COVID-19 VIRUS (CORONAVIRUS) BY PCR         Treatments and/or interventions provided: ativan, fluids  Patient's response to treatments and/or interventions: HR and tremors improving    To be done/followed up on inpatient unit:  monitor withdrawal    Does this patient have any cognitive concerns?: N/A    Activity level - Baseline/Home:  Independent- states sometimes she uses a cane or  walker  Activity Level - Current:   Unknown    Patient's Preferred language: English   Needed?: No    Isolation: None  Infection: Not Applicable  Patient tested for COVID 19 prior to admission: YES  Bariatric?: No    Vital Signs:   Vitals:    03/10/22 1530 03/10/22 1540 03/10/22 1600 03/10/22 1630   BP: (!) 127/95  100/80    Pulse:  99 102    Resp: 18 22 23    Temp:       TempSrc:       SpO2: 95% 94% 95% 96%   Weight:       Height:           Cardiac Rhythm:Cardiac Rhythm: Normal sinus rhythm    Was the PSS-3 completed:   Yes  What interventions are required if any?               Family Comments:   OBS brochure/video discussed/provided to patient/family: N/A              Name of person given brochure if not patient:               Relationship to patient:     For the majority of the shift this patient's behavior was Green.   Behavioral interventions performed were .    ED NURSE PHONE NUMBER: [phone]

## 2022-03-10 NOTE — ED PROVIDER NOTES
History   Chief Complaint:  Chest Pain       The history is provided by the patient.      Kavitha Headley is a 76 year old female with history of alcohol use disorder who presents with chest pain. On 3/8/22, the patient felt unwell and when she got up to use the bathroom, her foot slipped and she had a mechanical fall. She denies any head trauma or loss of consciousness. Her phone was in the living room and it took her two days to reach her phone to call EMS. Here, the patient presents with bruising to her knees and elbows. She reports shortness of breath, chest tightness, anxiety, nausea without vomiting, and feeling shaky. Linda usually drinks four glasses of demetrice daily and her last drink was two days ago (prior to the fall). She denies any chest pain, headache, or past falls. In addition, the patient states that she was admitted to the hospital in December for viral pneumonia, at that time she had alcohol withdrawal and resumed drinking after discharge.     Review of Systems   Respiratory: Positive for chest tightness and shortness of breath.    Cardiovascular: Negative for chest pain.   Gastrointestinal: Positive for nausea. Negative for vomiting.   Neurological: Negative for headaches.        No head trauma  No loss of consciousness   Psychiatric/Behavioral: The patient is nervous/anxious.    All other systems reviewed and are negative.    Allergies:  Bactrim [Sulfamethoxazole W/Trimethoprim]  Codeine  Morphine    Medications:  acamprosate (CAMPRAL) 333 MG EC tablet  acetaminophen (TYLENOL) 500 MG tablet  albuterol (PROAIR HFA/PROVENTIL HFA/VENTOLIN HFA) 108 (90 Base) MCG/ACT inhaler  aspirin 81 MG EC tablet  atenolol (TENORMIN) 25 MG tablet  citalopram (CELEXA) 20 MG tablet  diclofenac (VOLTAREN) 50 MG EC tablet  folic acid (FOLVITE) 1 MG tablet  furosemide (LASIX) 20 MG tablet  gabapentin (NEURONTIN) 300 MG capsule  hydrOXYzine (ATARAX) 25 MG tablet  ipratropium - albuterol 0.5 mg/2.5 mg/3 mL (DUONEB)  0.5-2.5 (3) MG/3ML neb solution  losartan (COZAAR) 25 MG tablet  methocarbamol (ROBAXIN) 500 MG tablet  mirtazapine (REMERON) 15 MG tablet  mometasone-formoterol (DULERA) 200-5 MCG/ACT inhaler  Multiple Vitamins-Minerals (CENTRUM SILVER) per tablet  polyethylene glycol (MIRALAX) 17 GM/Dose powder  pravastatin (PRAVACHOL) 20 MG tablet  QUEtiapine (SEROQUEL XR) 150 MG TB24 24 hr tablet  QUEtiapine (SEROQUEL) 50 MG tablet  senna-docusate (SENOKOT-S/PERICOLACE) 8.6-50 MG tablet  traZODone (DESYREL) 100 MG tablet  valACYclovir (VALTREX) 1000 mg tablet    Past Medical History:       Coronary artery disease involving native coronary artery of native heart without angina pectoris   GERD (gastroesophageal reflux disease)   Hip joint replacement status   Kidney stones   Macrocytic anemia   Major depressive disorder, single episode, severe, without mention of psychotic behavior   Mixed hyperlipidemia   Moderate aortic stenosis   Pelvic relaxation disorder   Personal history of urinary calculi   PVC (premature ventricular contraction)    SVT (supraventricular tachycardia)   Spinal stenosis  Benign essential hypertension  Chronic insomnia  Alcohol use disorder, severe, in early remission (H)  CKD (chronic kidney disease) stage 3, GFR 30-59 ml/min (H)  Knee joint replacement status  Chronic pain syndrome  Bariatric surgery status  Personal history of urinary calculi  Anemia, unspecified type  Anxiety  Vitamin B12 deficiency (non anemic)  Liver disease, chronic, due to alcohol   Coronary artery disease involving native coronary artery of native heart without angina pectoris  Mild ascending aorta dilatation   Psoriasis  Thiamine deficiency  Cold sore  Gastroesophageal reflux disease with esophagitis - chronic due to alcohol  Elevated brain natriuretic peptide (BNP) level  Enlarged pulmonary artery   Acute respiratory failure with hypoxia   Chronic diastolic (congestive) heart failure  Mild recurrent major depression   Alcohol  "dependence     Past Surgical History:    Appendectomy   Arthrodesis toe(s), right   C mediastinoscopy with or without biopsy   Carpal tunnel release, bilateral   Cholecystectomy, laparoscopic   Colonoscopy   Cystocele repair   Cystoscopy, lithotripsy, combined  Endoscopic ultrasound upper gastrointestinal tract (GI)  Hernia repair  Hysterectomy   Right total hip arthroplasty   Laparotomy, lysis adhesions, combined  Lymph node biopsy   Mammoplasty augmentation, bilateral   Repair hammer toe  Revise reconstructed breast  Gastric bypass  Repair of rectocele  Total knee arthroplasty, left     Family History:    Substance abuse (father)   Cancer, throat and lung (father)     Social History:  The patient is a daily alcohol user.     Physical Exam     Patient Vitals for the past 24 hrs:   BP Temp Temp src Pulse Resp SpO2 Height Weight   03/10/22 2014 (!) 156/76 -- -- 97 -- -- -- --   03/10/22 1955 (!) 155/84 98.1  F (36.7  C) Oral 98 18 96 % -- --   03/10/22 1929 (!) 133/102 -- -- -- -- -- -- --   03/10/22 1900 (!) 133/102 -- -- 92 20 97 % -- --   03/10/22 1800 128/80 -- -- 90 20 97 % -- --   03/10/22 1730 (!) 143/98 -- -- 110 19 98 % -- --   03/10/22 1700 139/89 -- -- 98 -- 94 % -- --   03/10/22 1630 (!) 132/91 -- -- 100 -- 96 % -- --   03/10/22 1600 100/80 -- -- 102 23 95 % -- --   03/10/22 1540 -- -- -- 99 22 94 % -- --   03/10/22 1530 (!) 127/95 -- -- -- 18 95 % -- --   03/10/22 1445 (!) 114/90 98  F (36.7  C) Tympanic 95 18 95 % 1.626 m (5' 4\") 81.6 kg (180 lb)       Physical Exam    Eye:  Pupils are equal, round, and reactive.  Extraocular movements intact.    ENT:  No rhinorrhea.  Moist mucus membranes.  Normal tongue and tonsil.    Cardiac: Mildly tachycardic rate and regular rhythm.  No murmurs, gallops, or rubs.    Pulmonary:  Clear to auscultation bilaterally.  No wheezes, rales, or rhonchi.    Abdomen:  Positive bowel sounds.  Abdomen is soft and non-distended, without focal tenderness.    Musculoskeletal:  " Normal movement of all extremities without evidence for deficit.    Skin:  Warm and dry without rashes. Scattered bruises over the elbows and knees without bleeding.     Neurologic:  Non-focal exam without asymmetric weakness or numbness. Markedly tremulous.     Psychiatric:  Normal affect with appropriate interaction with examiner. Patient admits to chronic alcoholism.    Emergency Department Course   ECG  ECG obtained at 1455, ECG read at 1538  Sinus rhythm with short AR with premature supraventricular complexes  Low voltage QRS   Inferior infarct, age undetermined  Abnormal ECG  Rate 92 bpm. AR interval 110 ms. QRS duration 62 ms. QT/QTc 432/534 ms. P-R-T axes 34 -23 66.     Laboratory:  Labs Ordered and Resulted from Time of ED Arrival to Time of ED Departure   COMPREHENSIVE METABOLIC PANEL - Abnormal       Result Value    Sodium 132 (*)     Potassium 3.5      Chloride 93 (*)     Carbon Dioxide (CO2) 25      Anion Gap 14      Urea Nitrogen 11      Creatinine 0.90      Calcium 9.0      Glucose 146 (*)     Alkaline Phosphatase 111      AST 41      ALT 23      Protein Total 6.8      Albumin 3.0 (*)     Bilirubin Total 0.7      GFR Estimate 65     CK TOTAL - Abnormal     (*)    CBC WITH PLATELETS AND DIFFERENTIAL - Abnormal    WBC Count 13.6 (*)     RBC Count 3.83      Hemoglobin 11.1 (*)     Hematocrit 33.4 (*)     MCV 87      MCH 29.0      MCHC 33.2      RDW 14.2      Platelet Count        % Neutrophils 82      % Lymphocytes 7      % Monocytes 11      % Eosinophils 0      % Basophils 0      % Immature Granulocytes 0      NRBCs per 100 WBC 0      Absolute Neutrophils 11.1 (*)     Absolute Lymphocytes 0.9      Absolute Monocytes 1.5 (*)     Absolute Eosinophils 0.1      Absolute Basophils 0.1      Absolute Immature Granulocytes 0.1      Absolute NRBCs 0.0     RBC AND PLATELET MORPHOLOGY - Abnormal    Platelet Assessment Platelets Clumped (*)     RBC Morphology Confirmed RBC Indices     LIPASE - Normal     Lipase 202     ETHYL ALCOHOL LEVEL - Normal    Alcohol ethyl <0.01     TROPONIN I - Normal    Troponin I High Sensitivity 15     COVID-19 VIRUS (CORONAVIRUS) BY PCR - Normal    SARS CoV2 PCR Negative          Procedures  none    Emergency Department Course:    Reviewed:  I reviewed nursing notes, vitals and past medical history    Assessments:  1519 The medical student, Dary Cox, obtained history and examined the patient as noted above.   1545 I rechecked the patient and explained findings.     Interventions:  1506 LORazepam (ATIVAN) injection 2 mg, IV   1507 0.9% sodium chloride BOLUS 1,000 mL, IV  1620 folic acid (FOLVITE) tablet 1 mg, PO   1620 thiamine (B-1) tablet 100 mg, PO   1621 multivitamin, therapeutic (THERA-VIT) tablet 1 tablet, PO   1622 0.9% sodium chloride BOLUS 1,000 mL, IV  1622 magnesium oxide (MAG-OX) tablet 800 mg, PO   1637 LORazepam (ATIVAN) injection 2 mg, IV     Disposition:  The patient was admitted to the hospital under the care of Dr. aFrias.     Impression & Plan     Medical Decision Making:  This very pleasant 76-year-old woman with a long history of alcoholism presents to us after suffering a fall in her home while intoxicated.  The patient tells me that she thinks she fell 2 days ago, though this seems unlikely considering her appearance along with reassuring labs.  She does show some abrasions to her knees and has a mildly elevated CK level.  Otherwise, she shows no other signs of trauma.  The remainder of her labs are reassuring.  She is in significant withdrawal with an alcohol level that is undetectable.  With her elevated CIWA score, she received multiple doses of lorazepam.  I spoke with Dr. Farias of the hospitalist service who agrees to admit the patient to an inpatient bed for withdrawal along with fluids for her rhabdomyolysis and determination of her ability to return to her home.  Patient is comfortable with this plan for admission.    Diagnosis:    ICD-10-CM    1.  Alcohol withdrawal syndrome without complication (H)  F10.230    2. Non-traumatic rhabdomyolysis  M62.82        Scribe Disclosure:  I, Glo Hull, am serving as a scribe at 3:19 PM on 3/10/2022 to document services personally performed by Trierweiler, Chad A, MD based on my observations and the provider's statements to me.        Trierweiler, Chad A, MD  03/10/22 8203

## 2022-03-10 NOTE — ED TRIAGE NOTES
Pt presents from home via EMS for evaluation of cp and SOB, pt had a fall in bathroom yesterday afternoon and has been on the floor since, pt reports finally getting to her phone and calling 911. Pt endorses CP and SOB and is very anxious upon arrival to ED

## 2022-03-10 NOTE — ED NOTES
Bed: ED02  Expected date:   Expected time:   Means of arrival:   Comments:  [place] - 76 F chest pain eta 1430

## 2022-03-11 ENCOUNTER — APPOINTMENT (OUTPATIENT)
Dept: GENERAL RADIOLOGY | Facility: CLINIC | Age: 77
DRG: 897 | End: 2022-03-11
Attending: STUDENT IN AN ORGANIZED HEALTH CARE EDUCATION/TRAINING PROGRAM
Payer: COMMERCIAL

## 2022-03-11 ENCOUNTER — APPOINTMENT (OUTPATIENT)
Dept: PHYSICAL THERAPY | Facility: CLINIC | Age: 77
DRG: 897 | End: 2022-03-11
Attending: STUDENT IN AN ORGANIZED HEALTH CARE EDUCATION/TRAINING PROGRAM
Payer: COMMERCIAL

## 2022-03-11 LAB
ALBUMIN UR-MCNC: NEGATIVE MG/DL
ANION GAP SERPL CALCULATED.3IONS-SCNC: 4 MMOL/L (ref 3–14)
APPEARANCE UR: ABNORMAL
BACTERIA #/AREA URNS HPF: ABNORMAL /HPF
BILIRUB UR QL STRIP: NEGATIVE
BUN SERPL-MCNC: 8 MG/DL (ref 7–30)
CALCIUM SERPL-MCNC: 7.7 MG/DL (ref 8.5–10.1)
CHLORIDE BLD-SCNC: 104 MMOL/L (ref 94–109)
CK SERPL-CCNC: 392 U/L (ref 30–225)
CO2 SERPL-SCNC: 29 MMOL/L (ref 20–32)
COLOR UR AUTO: ABNORMAL
CREAT SERPL-MCNC: 0.89 MG/DL (ref 0.52–1.04)
ERYTHROCYTE [DISTWIDTH] IN BLOOD BY AUTOMATED COUNT: 14.3 % (ref 10–15)
GFR SERPL CREATININE-BSD FRML MDRD: 66 ML/MIN/1.73M2
GLUCOSE BLD-MCNC: 98 MG/DL (ref 70–99)
GLUCOSE UR STRIP-MCNC: NEGATIVE MG/DL
HCT VFR BLD AUTO: 25.2 % (ref 35–47)
HGB BLD-MCNC: 8.3 G/DL (ref 11.7–15.7)
HGB UR QL STRIP: NEGATIVE
KETONES UR STRIP-MCNC: NEGATIVE MG/DL
LEUKOCYTE ESTERASE UR QL STRIP: ABNORMAL
MAGNESIUM SERPL-MCNC: 1.6 MG/DL (ref 1.6–2.3)
MCH RBC QN AUTO: 29.1 PG (ref 26.5–33)
MCHC RBC AUTO-ENTMCNC: 32.9 G/DL (ref 31.5–36.5)
MCV RBC AUTO: 88 FL (ref 78–100)
NITRATE UR QL: NEGATIVE
PH UR STRIP: 6 [PH] (ref 5–7)
PLATELET # BLD AUTO: 276 10E3/UL (ref 150–450)
POTASSIUM BLD-SCNC: 3 MMOL/L (ref 3.4–5.3)
POTASSIUM BLD-SCNC: 3.1 MMOL/L (ref 3.4–5.3)
POTASSIUM BLD-SCNC: 4 MMOL/L (ref 3.4–5.3)
RBC # BLD AUTO: 2.85 10E6/UL (ref 3.8–5.2)
RBC URINE: 3 /HPF
SODIUM SERPL-SCNC: 137 MMOL/L (ref 133–144)
SP GR UR STRIP: 1 (ref 1–1.03)
SQUAMOUS EPITHELIAL: 1 /HPF
UROBILINOGEN UR STRIP-MCNC: NORMAL MG/DL
WBC # BLD AUTO: 7.1 10E3/UL (ref 4–11)
WBC URINE: 21 /HPF

## 2022-03-11 PROCEDURE — 82550 ASSAY OF CK (CPK): CPT | Performed by: STUDENT IN AN ORGANIZED HEALTH CARE EDUCATION/TRAINING PROGRAM

## 2022-03-11 PROCEDURE — 81001 URINALYSIS AUTO W/SCOPE: CPT | Performed by: STUDENT IN AN ORGANIZED HEALTH CARE EDUCATION/TRAINING PROGRAM

## 2022-03-11 PROCEDURE — 250N000013 HC RX MED GY IP 250 OP 250 PS 637: Performed by: INTERNAL MEDICINE

## 2022-03-11 PROCEDURE — 250N000013 HC RX MED GY IP 250 OP 250 PS 637: Performed by: STUDENT IN AN ORGANIZED HEALTH CARE EDUCATION/TRAINING PROGRAM

## 2022-03-11 PROCEDURE — 97116 GAIT TRAINING THERAPY: CPT | Mod: GP | Performed by: PHYSICAL THERAPIST

## 2022-03-11 PROCEDURE — 120N000001 HC R&B MED SURG/OB

## 2022-03-11 PROCEDURE — 99232 SBSQ HOSP IP/OBS MODERATE 35: CPT | Performed by: INTERNAL MEDICINE

## 2022-03-11 PROCEDURE — 36415 COLL VENOUS BLD VENIPUNCTURE: CPT | Performed by: STUDENT IN AN ORGANIZED HEALTH CARE EDUCATION/TRAINING PROGRAM

## 2022-03-11 PROCEDURE — 85027 COMPLETE CBC AUTOMATED: CPT | Performed by: STUDENT IN AN ORGANIZED HEALTH CARE EDUCATION/TRAINING PROGRAM

## 2022-03-11 PROCEDURE — 87186 SC STD MICRODIL/AGAR DIL: CPT | Performed by: STUDENT IN AN ORGANIZED HEALTH CARE EDUCATION/TRAINING PROGRAM

## 2022-03-11 PROCEDURE — 97530 THERAPEUTIC ACTIVITIES: CPT | Mod: GP | Performed by: PHYSICAL THERAPIST

## 2022-03-11 PROCEDURE — 80048 BASIC METABOLIC PNL TOTAL CA: CPT | Performed by: STUDENT IN AN ORGANIZED HEALTH CARE EDUCATION/TRAINING PROGRAM

## 2022-03-11 PROCEDURE — 71045 X-RAY EXAM CHEST 1 VIEW: CPT

## 2022-03-11 PROCEDURE — 97161 PT EVAL LOW COMPLEX 20 MIN: CPT | Mod: GP | Performed by: PHYSICAL THERAPIST

## 2022-03-11 PROCEDURE — 84132 ASSAY OF SERUM POTASSIUM: CPT | Performed by: INTERNAL MEDICINE

## 2022-03-11 PROCEDURE — 258N000003 HC RX IP 258 OP 636: Performed by: STUDENT IN AN ORGANIZED HEALTH CARE EDUCATION/TRAINING PROGRAM

## 2022-03-11 PROCEDURE — 83735 ASSAY OF MAGNESIUM: CPT | Performed by: INTERNAL MEDICINE

## 2022-03-11 PROCEDURE — 36415 COLL VENOUS BLD VENIPUNCTURE: CPT | Performed by: INTERNAL MEDICINE

## 2022-03-11 RX ORDER — POTASSIUM CHLORIDE 1500 MG/1
40 TABLET, EXTENDED RELEASE ORAL ONCE
Status: COMPLETED | OUTPATIENT
Start: 2022-03-11 | End: 2022-03-11

## 2022-03-11 RX ORDER — POTASSIUM CHLORIDE 1500 MG/1
20 TABLET, EXTENDED RELEASE ORAL ONCE
Status: COMPLETED | OUTPATIENT
Start: 2022-03-11 | End: 2022-03-11

## 2022-03-11 RX ADMIN — THIAMINE HCL TAB 100 MG 100 MG: 100 TAB at 09:05

## 2022-03-11 RX ADMIN — HYDROXYZINE HYDROCHLORIDE 25 MG: 25 TABLET ORAL at 20:37

## 2022-03-11 RX ADMIN — GABAPENTIN 300 MG: 300 CAPSULE ORAL at 09:05

## 2022-03-11 RX ADMIN — CITALOPRAM HYDROBROMIDE 20 MG: 10 TABLET ORAL at 09:05

## 2022-03-11 RX ADMIN — SODIUM CHLORIDE: 9 INJECTION, SOLUTION INTRAVENOUS at 06:22

## 2022-03-11 RX ADMIN — GABAPENTIN 300 MG: 300 CAPSULE ORAL at 22:16

## 2022-03-11 RX ADMIN — ATENOLOL 25 MG: 25 TABLET ORAL at 09:05

## 2022-03-11 RX ADMIN — LORAZEPAM 1 MG: 1 TABLET ORAL at 22:26

## 2022-03-11 RX ADMIN — ASPIRIN 81 MG: 81 TABLET, COATED ORAL at 09:05

## 2022-03-11 RX ADMIN — GABAPENTIN 300 MG: 300 CAPSULE ORAL at 16:58

## 2022-03-11 RX ADMIN — FLUTICASONE FUROATE AND VILANTEROL TRIFENATATE 1 PUFF: 200; 25 POWDER RESPIRATORY (INHALATION) at 09:05

## 2022-03-11 RX ADMIN — MULTIPLE VITAMINS W/ MINERALS TAB 1 TABLET: TAB at 09:05

## 2022-03-11 RX ADMIN — POTASSIUM CHLORIDE 40 MEQ: 1500 TABLET, EXTENDED RELEASE ORAL at 10:40

## 2022-03-11 RX ADMIN — POTASSIUM CHLORIDE 20 MEQ: 1500 TABLET, EXTENDED RELEASE ORAL at 13:06

## 2022-03-11 RX ADMIN — QUETIAPINE FUMARATE 50 MG: 25 TABLET ORAL at 20:37

## 2022-03-11 RX ADMIN — TRAZODONE HYDROCHLORIDE 100 MG: 100 TABLET ORAL at 22:26

## 2022-03-11 RX ADMIN — MIRTAZAPINE 15 MG: 15 TABLET, FILM COATED ORAL at 22:16

## 2022-03-11 RX ADMIN — FOLIC ACID 1 MG: 1 TABLET ORAL at 09:05

## 2022-03-11 ASSESSMENT — ACTIVITIES OF DAILY LIVING (ADL)
ADLS_ACUITY_SCORE: 17
ADLS_ACUITY_SCORE: 10
ADLS_ACUITY_SCORE: 10
ADLS_ACUITY_SCORE: 17
ADLS_ACUITY_SCORE: 10
ADLS_ACUITY_SCORE: 17
ADLS_ACUITY_SCORE: 10
ADLS_ACUITY_SCORE: 17
ADLS_ACUITY_SCORE: 10
ADLS_ACUITY_SCORE: 17
ADLS_ACUITY_SCORE: 10
ADLS_ACUITY_SCORE: 17
ADLS_ACUITY_SCORE: 10
ADLS_ACUITY_SCORE: 17
ADLS_ACUITY_SCORE: 10
ADLS_ACUITY_SCORE: 10
ADLS_ACUITY_SCORE: 17
ADLS_ACUITY_SCORE: 17

## 2022-03-11 NOTE — PROGRESS NOTES
[facility]    Medicine Progress Note - Hospitalist Service    Date of Admission:  3/10/2022    Assessment & Plan        Kavitha Headley is a 76 year old female with past medical history significatnt for alcohol use disorder admitted on 3/10/2022 after a mechanical fall, She is found to be in alcohol withdrawal.     Mechanical Fall   Elevated CK  The patient reports that two days ago she got up to use the bathroom, slipped and fell. She was apparently unable to get up on her own but was able to crawl into the other room to get her phone to call EMS. She does have scattered bruising/scrapes on her knees and elbows. Her CK is mildly elevated to 460. No other apparent injuries     Stop IV fluids    Monitor CK     PT/OT consultation     Alcohol use disorder   Alcohol withdrawal   Pt tachycardic and tremulous on arrival to the ED. She usually has about 1 pint of demetrice daily. Her last drink was reportedly two days ago prior to her fall. She continues to be tremulous.    CIWA with PRN ativan     Thiamine/folate multivitamin     Chem Dep consult     Dyspnea   ?Hx of asthma/COPD  Pt notes some shortness of breath. O2 sats are stable on room air, but she is clearly dyspneic on examination. She is on Dulera, albuterol and duonebs PTA so presumably has some underlying asthma/COPD, but this is not clearly documented int he medical record.     CXR shows only low lung volumes (normal heart size, no infiltrates)    BNP is normal, nothing for CHF on CXR.     Age adjusted d-dimer (0.1 x age) is normal, does not need further evaluation for PE    Continue PTA dulera, albuterol, duonebs PRN     Hyponatremia: Na = 132 mmol/L (Ref range: 133 - 144 mmol/L) on admission, will monitor as appropriate    Hypomagnesemia     Mag replacement oer protocol     Mild leukocytosis   WBC 13.6. no clear signs/symptoms of infection     Monitor WBC    Check UA     Normocytic Anemia   hgb is 11.1 on admission which is actually  "higher than her baseline of around 8-10. Likely some component of hemoconcentration in the setting of dehydration    Monitor hemoglobin    Severe aortic stenosis  Diastolic congestive heart failure   Pulmonary HTN    TTE from 2021 showed EF of 60-65% with elevated left ventricular filling pressures. Moderate to severe valvular aortic stenosis. TTE from 12/2021 could not assess aortic valve gradients, but moderate Pulmonary HTN was present.    She describes dyspnea on exertion, she thinks symptoms are progressive since a hospital stay in December    Recheck echo, assess aortic valve    Depression, anxiety  Sleep disturbance     Continue PTA Celexa, Remeron, Seroquel, trazodone and hydroxyzine      Hypertension    Hold PTA losartan, resume PTA atenolol     Hyperlipidemia    Hold PTA pravachol given elevated CK      CKD Stage 2/3  Cr near 0.8-1 at baseline. Cr 0.90 on admit    Stable    Obesity: Estimated body mass index is 30.9 kg/m  as calculated from the following:    Height as of this encounter: 1.626 m (5' 4\").    Weight as of this encounter: 81.6 kg (180 lb)    Diet: Combination Diet Regular Diet Adult    DVT Prophylaxis: Pneumatic Compression Devices  [place] Catheter: Not present  Central Lines: None  Cardiac Monitoring: ACTIVE order. Indication: Alcohol withdrawal  Code Status: Full Code      Disposition Plan   Expected Discharge: 03/13/2022     Anticipated discharge location:  Awaiting care coordination huddle  Delays:    Pending improvement alcohol withdrawal, safe disposition plan available       The patient's care was discussed with the Bedside Nurse and Patient.    No Hammer MD  Hospitalist Service  [facility]  Securely message with the Vocera Web Console (learn more here)  Text page via [facility] [employer] Paging/Directory         Clinically Significant Risk Factors Present on Admission               # Platelet Defect: home medication list includes an antiplatelet medication   # Obesity: " "Estimated body mass index is 30.9 kg/m  as calculated from the following:    Height as of this encounter: 1.626 m (5' 4\").    Weight as of this encounter: 81.6 kg (180 lb).       ______________________________________________________________________    Interval History   \"I just tripped.\"  Patient recalls events surrounding her fall.  She says she tripped over something, landed on the floor and, \"I just couldn't get up.\"  She says that after 5 or 6 hours, she was able to crawl to her cell phone and called paramedics.  We discussed her alcohol intake.  She says that she was able to maintain sobriety for about 6 weeks and, \"then I went down the rabbit hole.\"  She is eager for resources to help her regain sobriety.    Data reviewed today: I reviewed all medications, new labs and imaging results over the last 24 hours. I personally reviewed the chest x-ray image(s) showing low lung volumes, bibasilar atelectasis.    Physical Exam   Vital Signs: Temp: 97.4  F (36.3  C) Temp src: Oral BP: 99/61 Pulse: 64   Resp: 16 SpO2: 96 % O2 Device: None (Room air)    Weight: 180 lbs 0 oz  Constitutional: Awake, alert, cooperative, no apparent distress  Respiratory: Clear to auscultation bilaterally, no crackles or wheezing  Cardiovascular: Regular rate and rhythm with harsh systolic murmur  GI: Normal bowel sounds, soft, non-distended, non-tender  Skin/Integumen: She has scars overlying both knees consistent with prior TKA.  She has a skin tear on the left arm and a tiny abrasion on the right knee  Other: Mood is pleasant and outgoing      Data   Recent Labs   Lab 03/11/22  0645 03/10/22  2121 03/10/22  1454   WBC 7.1  --  13.6*   HGB 8.3*  --  11.1*   MCV 88  --  87     --   --      --  132*   POTASSIUM 3.0*  --  3.5   CHLORIDE 104  --  93*   CO2 29  --  25   BUN 8  --  11   CR 0.89  --  0.90   ANIONGAP 4  --  14   BIRGIT 7.7*  --  9.0   GLC 98 134* 146*   ALBUMIN  --   --  3.0*   PROTTOTAL  --   --  6.8   BILITOTAL  --   " --  0.7   ALKPHOS  --   --  111   ALT  --   --  23   AST  --   --  41   LIPASE  --   --  202     Recent Results (from the past 24 hour(s))   XR Chest Port 1 View    Narrative    EXAM: XR CHEST PORT 1 VIEW  LOCATION: [facility]  DATE/TIME: 3/11/2022 5:30 AM    INDICATION: dyspnea  COMPARISON: 12/29/2021      Impression    IMPRESSION: Lung volumes are low with bibasilar atelectasis. Heart size is likely stable. No overt failure, large pleural effusions or pneumothorax. No acute bony abnormality.     Medications     sodium chloride 100 mL/hr at 03/11/22 0622       aspirin  81 mg Oral Daily     atenolol  25 mg Oral Daily     citalopram  20 mg Oral Daily     fluticasone-vilanterol  1 puff Inhalation Daily     folic acid  1 mg Oral Daily     gabapentin  300 mg Oral TID     mirtazapine  15 mg Oral At Bedtime     multivitamin w/minerals  1 tablet Oral Daily     QUEtiapine  300 mg Oral At Bedtime     sodium chloride (PF)  3 mL Intracatheter Q8H     thiamine  100 mg Oral Daily

## 2022-03-11 NOTE — PROGRESS NOTES
03/11/22 1600   Quick Adds   Type of Visit Initial PT Evaluation   Living Environment   People in Home alone   Current Living Arrangements house   Home Accessibility stairs to enter home   Number of Stairs, Main Entrance 3   Stair Railings, Main Entrance railing on right side (ascending)   Transportation Anticipated car, drives self   Living Environment Comments Pt states her boyfriend lives wtih her 3-4 days/week otherwise she is alone.  Pt does all ADL's I and I with mob with out device   Self-Care   Usual Activity Tolerance moderate   Current Activity Tolerance fair   Regular Exercise No   Equipment Currently Used at Home none   Fall history within last six months yes   Number of times patient has fallen within last six months 1   General Information   Onset of Illness/Injury or Date of Surgery 03/10/22   Referring Physician Haydee Perez   Pertinent History of Current Problem (include personal factors and/or comorbidities that impact the POC) Kavitha Headley is a 76 year old female with past medical history significatnt for alcohol use disorder admitted on 3/10/2022 after a mechanical fall, She is found to be in alcohol withdrawal.    Existing Precautions/Restrictions fall   Weight-Bearing Status - LUE full weight-bearing   Weight-Bearing Status - RUE full weight-bearing   Weight-Bearing Status - LLE full weight-bearing   Weight-Bearing Status - RLE full weight-bearing   Cognition   Orientation Status (Cognition) oriented x 3   Pain Assessment   Patient Currently in Pain No   Strength (Manual Muscle Testing)   Strength (Manual Muscle Testing) Deficits observed during functional mobility   Bed Mobility   Bed Mobility rolling left;supine-sit;sit-supine   Rolling Left [place] (Bed Mobility) modified independence;verbal cues;nonverbal cues (demo/gesture)   Supine-Sit [place] (Bed Mobility) verbal cues;nonverbal cues (demo/gesture);contact guard   Sit-Supine [place] (Bed Mobility) modified  independence;supervision;verbal cues;nonverbal cues (demo/gesture)   Bed Mobility Limitations decreased ability to use arms for pushing/pulling;decreased ability to use legs for bridging/pushing;impaired ability to control trunk for mobility   Impairments Contributing to Impaired Bed Mobility decreased strength   Assistive Device (Bed Mobility) bed rails   Transfers   Transfers sit-stand transfer   Transfer Safety Concerns Noted losing balance backward   Impairments Contributing to Impaired Transfers decreased strength;impaired balance   Sit-Stand Transfer   Sit-Stand [place] (Transfers) moderate assist (50% patient effort);1 person to manage equipment;verbal cues;nonverbal cues (demo/gesture)   Assistive Device (Sit-Stand Transfers) walker, front-wheeled   Gait/Stairs (Locomotion)   [place] Level (Gait) minimum assist (75% patient effort);1 person to manage equipment;verbal cues;nonverbal cues (demo/gesture)   Assistive Device (Gait) walker, front-wheeled   Distance in Feet (Required for LE Total Joints) 60   Pattern (Gait) swing-through   Deviations/Abnormal Patterns (Gait) katiana decreased;gait speed decreased;stride length decreased   Balance   Balance Comments Decreased balance requires B UE support and AD to maintain   Clinical Impression   Criteria for Skilled Therapeutic Intervention Yes, treatment indicated   PT Diagnosis (PT) difficulty with all mob   Influenced by the following impairments decreased strength, balance and act mai   Functional limitations due to impairments impaired functional mob and safety and mai   Clinical Presentation (PT Evaluation Complexity) Stable/Uncomplicated   Clinical Presentation Rationale clinical judgement   Clinical Decision Making (Complexity) low complexity   Planned Therapy Interventions (PT) balance training;bed mobility training;gait training;strengthening;stair training;transfer training   Risk & Benefits of therapy have been explained evaluation/treatment  results reviewed;care plan/treatment goals reviewed;risks/benefits reviewed;current/potential barriers reviewed;participants voiced agreement with care plan;participants included;patient   PT Discharge Planning   PT Discharge Recommendation (DC Rec) Transitional Care Facility;home with home care physical therapy;home with assist   PT Rationale for DC Rec PT is well below baseline of I, significant weakness limiting mob I, mai and safety, will benefit from cont therapies to improve deficits, fall risk, if dc to home would need 24/7 supervision and min to mod A and FWW   PT Brief overview of current status Pt currently requires bed rail and has difficulty with mob mob with SBA/CGA, mod A sit to stnad with FWW, min A with strong hold on GB for amb with FWW 60'   Total Evaluation Time   Total Evaluation Time (Minutes) 10   Physical Therapy Goals   PT Frequency 5x/week   PT Predicated Duration/Target Date for Goal Attainment 03/14/22   PT Goals Bed Mobility;Transfers;Gait;Stairs   PT: Bed Mobility Independent   PT: Transfers Supervision/stand-by assist;Assistive device   PT: Gait Supervision/stand-by assist;Rolling walker   PT: Stairs Minimal assist;3 stairs;Rail on right

## 2022-03-11 NOTE — PROGRESS NOTES
Patient arrived from ED at around 1955, A&O x4. VSS ex   hypertensive. Patient is here with SOB and Alcohol user,  With CIWA assessment, patient score 8 ativan given x1, a reassessment after 1 hr score was 4. Anxious at time and with  Tremors. mepilex place on the left arm for tear skin.   Pure with in place, can be assist of 1-2, not OOB on this shift Awaiting  Chest x-ray to rule out PE per MD order/ statement. UA ordered, unable  Get clean urine catch.

## 2022-03-11 NOTE — PLAN OF CARE
Shift Summary 1900-0730    Admitting Diagnosis: Alcohol withdrawal syndrome without complication (H) [F10.230]  Non-traumatic rhabdomyolysis [M62.82]   Vitals VSS except hypotensive at times  Pain 0/10.   A&Ox3, CIWA scored a one had trouble with dates  Voiding BSC  Mobility Ast x2 GB/W  Tele SR with PVC  CMS intact  Lung Sounds slight expiratory wheezing on RA  GI No BM  Dressing N/A    Orders Placed This Encounter      Combination Diet Regular Diet Adult       Plan:  Held last dose of Ativan for CIWA score of nine, patient was falling asleep during exam.  Able to pivot to BSC, Ast x 2.  Chest-Xray completed this AM.

## 2022-03-11 NOTE — PLAN OF CARE
Soft BP, other VSS, on RA, does have some dyspnea with baseline COPD,freq pursed lip breathing. A/O for the most part, but forgetful, and easily distracted! Watch with meds, took 3 reminders to use AM inhaler. Up w1-2+belt and walker, has only used commode today, and was incont x1. CIWA scores 3/2, mostly due to memory/concentration and mild tremors. K+ replaced, re-draw scheduled. Discharge pending improvement

## 2022-03-11 NOTE — ED NOTES
RECEIVING UNIT ED HANDOFF REVIEW    ED Nurse Handoff Report was reviewed by: Theodora Garcia RN on March 10, 2022 at 6:52 PM

## 2022-03-12 ENCOUNTER — APPOINTMENT (OUTPATIENT)
Dept: CARDIOLOGY | Facility: CLINIC | Age: 77
DRG: 897 | End: 2022-03-12
Attending: INTERNAL MEDICINE
Payer: COMMERCIAL

## 2022-03-12 LAB
ANION GAP SERPL CALCULATED.3IONS-SCNC: 5 MMOL/L (ref 3–14)
BUN SERPL-MCNC: 10 MG/DL (ref 7–30)
CALCIUM SERPL-MCNC: 8.2 MG/DL (ref 8.5–10.1)
CHLORIDE BLD-SCNC: 106 MMOL/L (ref 94–109)
CK SERPL-CCNC: 183 U/L (ref 30–225)
CO2 SERPL-SCNC: 26 MMOL/L (ref 20–32)
CREAT SERPL-MCNC: 0.88 MG/DL (ref 0.52–1.04)
ERYTHROCYTE [DISTWIDTH] IN BLOOD BY AUTOMATED COUNT: 14.7 % (ref 10–15)
GFR SERPL CREATININE-BSD FRML MDRD: 67 ML/MIN/1.73M2
GLUCOSE BLD-MCNC: 91 MG/DL (ref 70–99)
HCT VFR BLD AUTO: 26.4 % (ref 35–47)
HGB BLD-MCNC: 8.8 G/DL (ref 11.7–15.7)
LVEF ECHO: NORMAL
MAGNESIUM SERPL-MCNC: 1.6 MG/DL (ref 1.6–2.3)
MCH RBC QN AUTO: 29.2 PG (ref 26.5–33)
MCHC RBC AUTO-ENTMCNC: 33.3 G/DL (ref 31.5–36.5)
MCV RBC AUTO: 88 FL (ref 78–100)
PLATELET # BLD AUTO: 278 10E3/UL (ref 150–450)
POTASSIUM BLD-SCNC: 3.3 MMOL/L (ref 3.4–5.3)
POTASSIUM BLD-SCNC: 3.4 MMOL/L (ref 3.4–5.3)
POTASSIUM BLD-SCNC: 4.3 MMOL/L (ref 3.4–5.3)
RBC # BLD AUTO: 3.01 10E6/UL (ref 3.8–5.2)
SODIUM SERPL-SCNC: 137 MMOL/L (ref 133–144)
WBC # BLD AUTO: 7.5 10E3/UL (ref 4–11)

## 2022-03-12 PROCEDURE — 36415 COLL VENOUS BLD VENIPUNCTURE: CPT | Performed by: INTERNAL MEDICINE

## 2022-03-12 PROCEDURE — 93325 DOPPLER ECHO COLOR FLOW MAPG: CPT | Mod: 26 | Performed by: INTERNAL MEDICINE

## 2022-03-12 PROCEDURE — 84132 ASSAY OF SERUM POTASSIUM: CPT | Performed by: INTERNAL MEDICINE

## 2022-03-12 PROCEDURE — 250N000013 HC RX MED GY IP 250 OP 250 PS 637: Performed by: STUDENT IN AN ORGANIZED HEALTH CARE EDUCATION/TRAINING PROGRAM

## 2022-03-12 PROCEDURE — 83735 ASSAY OF MAGNESIUM: CPT | Performed by: INTERNAL MEDICINE

## 2022-03-12 PROCEDURE — 93308 TTE F-UP OR LMTD: CPT | Mod: 26 | Performed by: INTERNAL MEDICINE

## 2022-03-12 PROCEDURE — 120N000001 HC R&B MED SURG/OB

## 2022-03-12 PROCEDURE — 250N000013 HC RX MED GY IP 250 OP 250 PS 637: Performed by: INTERNAL MEDICINE

## 2022-03-12 PROCEDURE — 80048 BASIC METABOLIC PNL TOTAL CA: CPT | Performed by: INTERNAL MEDICINE

## 2022-03-12 PROCEDURE — 93321 DOPPLER ECHO F-UP/LMTD STD: CPT | Mod: 26 | Performed by: INTERNAL MEDICINE

## 2022-03-12 PROCEDURE — C8924 2D TTE W OR W/O FOL W/CON,FU: HCPCS

## 2022-03-12 PROCEDURE — 250N000011 HC RX IP 250 OP 636: Performed by: INTERNAL MEDICINE

## 2022-03-12 PROCEDURE — 82550 ASSAY OF CK (CPK): CPT | Performed by: INTERNAL MEDICINE

## 2022-03-12 PROCEDURE — 255N000002 HC RX 255 OP 636: Performed by: INTERNAL MEDICINE

## 2022-03-12 PROCEDURE — 85027 COMPLETE CBC AUTOMATED: CPT | Performed by: INTERNAL MEDICINE

## 2022-03-12 PROCEDURE — 99232 SBSQ HOSP IP/OBS MODERATE 35: CPT | Performed by: INTERNAL MEDICINE

## 2022-03-12 RX ORDER — FUROSEMIDE 20 MG
20 TABLET ORAL DAILY
Status: DISCONTINUED | OUTPATIENT
Start: 2022-03-12 | End: 2022-03-15

## 2022-03-12 RX ORDER — CEFTRIAXONE 1 G/1
1 INJECTION, POWDER, FOR SOLUTION INTRAMUSCULAR; INTRAVENOUS EVERY 24 HOURS
Status: DISCONTINUED | OUTPATIENT
Start: 2022-03-12 | End: 2022-03-15

## 2022-03-12 RX ORDER — POTASSIUM CHLORIDE 1500 MG/1
40 TABLET, EXTENDED RELEASE ORAL ONCE
Status: COMPLETED | OUTPATIENT
Start: 2022-03-12 | End: 2022-03-12

## 2022-03-12 RX ADMIN — GABAPENTIN 300 MG: 300 CAPSULE ORAL at 21:12

## 2022-03-12 RX ADMIN — MIRTAZAPINE 15 MG: 15 TABLET, FILM COATED ORAL at 21:12

## 2022-03-12 RX ADMIN — HYDROXYZINE HYDROCHLORIDE 50 MG: 25 TABLET ORAL at 11:31

## 2022-03-12 RX ADMIN — GABAPENTIN 300 MG: 300 CAPSULE ORAL at 08:53

## 2022-03-12 RX ADMIN — GABAPENTIN 300 MG: 300 CAPSULE ORAL at 16:30

## 2022-03-12 RX ADMIN — CEFTRIAXONE SODIUM 1 G: 1 INJECTION, POWDER, FOR SOLUTION INTRAMUSCULAR; INTRAVENOUS at 17:35

## 2022-03-12 RX ADMIN — QUETIAPINE FUMARATE 300 MG: 300 TABLET, EXTENDED RELEASE ORAL at 21:12

## 2022-03-12 RX ADMIN — HUMAN ALBUMIN MICROSPHERES AND PERFLUTREN 3 ML: 10; .22 INJECTION, SOLUTION INTRAVENOUS at 11:04

## 2022-03-12 RX ADMIN — MULTIPLE VITAMINS W/ MINERALS TAB 1 TABLET: TAB at 08:53

## 2022-03-12 RX ADMIN — CITALOPRAM HYDROBROMIDE 20 MG: 10 TABLET ORAL at 08:52

## 2022-03-12 RX ADMIN — THIAMINE HCL TAB 100 MG 100 MG: 100 TAB at 08:53

## 2022-03-12 RX ADMIN — POTASSIUM CHLORIDE 40 MEQ: 1500 TABLET, EXTENDED RELEASE ORAL at 17:40

## 2022-03-12 RX ADMIN — FLUTICASONE FUROATE AND VILANTEROL TRIFENATATE 1 PUFF: 200; 25 POWDER RESPIRATORY (INHALATION) at 08:54

## 2022-03-12 RX ADMIN — FOLIC ACID 1 MG: 1 TABLET ORAL at 08:53

## 2022-03-12 RX ADMIN — ASPIRIN 81 MG: 81 TABLET, COATED ORAL at 08:52

## 2022-03-12 RX ADMIN — LORAZEPAM 2 MG: 1 TABLET ORAL at 09:01

## 2022-03-12 RX ADMIN — LORAZEPAM 2 MG: 1 TABLET ORAL at 13:47

## 2022-03-12 RX ADMIN — LORAZEPAM 1 MG: 1 TABLET ORAL at 17:40

## 2022-03-12 RX ADMIN — POTASSIUM CHLORIDE 40 MEQ: 1500 TABLET, EXTENDED RELEASE ORAL at 10:22

## 2022-03-12 RX ADMIN — ACETAMINOPHEN 650 MG: 325 TABLET, FILM COATED ORAL at 09:01

## 2022-03-12 RX ADMIN — FUROSEMIDE 20 MG: 20 TABLET ORAL at 10:23

## 2022-03-12 RX ADMIN — ATENOLOL 25 MG: 25 TABLET ORAL at 08:53

## 2022-03-12 ASSESSMENT — ACTIVITIES OF DAILY LIVING (ADL)
ADLS_ACUITY_SCORE: 17
ADLS_ACUITY_SCORE: 17
ADLS_ACUITY_SCORE: 19
ADLS_ACUITY_SCORE: 17
ADLS_ACUITY_SCORE: 19
ADLS_ACUITY_SCORE: 17
ADLS_ACUITY_SCORE: 19
ADLS_ACUITY_SCORE: 17
ADLS_ACUITY_SCORE: 19
ADLS_ACUITY_SCORE: 17
ADLS_ACUITY_SCORE: 19
ADLS_ACUITY_SCORE: 20
ADLS_ACUITY_SCORE: 17
ADLS_ACUITY_SCORE: 19

## 2022-03-12 NOTE — PROGRESS NOTES
Pt is A&Ox4. VSS. Up with assist of 1 walker and GB. Uses bedside commode. Reg diet. IV saline locked. Tele; AFib. CIWA; 2. Pt slept well throughout this shift.

## 2022-03-12 NOTE — PROGRESS NOTES
Pt A&O x4, VSS, Up x1 with walker and GB. Ambulated pt in the room and BR. Tele sinus dysrhythmia. CIWA score 13 x2, PRN Ativan and Atarax given. K replacement x1.

## 2022-03-12 NOTE — PLAN OF CARE
"[id number]     Patient A&Ox4. CIWA scores 2, due to slight tremors and a bit of anxiety reported. Pt states tolerable, she reports more feeling \"PTSD from laying on the floor for 2 days\" Up A1-2 gb and walker. Did some ambulating in hallway with PT earlier this shift. K redraw 4.0 Mg 1.6 WNL. Will recheck levels in AM per protocol. Appetite excellent. Some audible expiratory wheezing which patient reports baseline, reached out to pharmacy for PRN albuterol. SpO2 96% on RA. Will continue to monitor.                     "

## 2022-03-12 NOTE — CONSULTS
Care Management Initial Consult    General Information  Assessment completed with: Patient, Children, Linda and daughter Nery  Type of CM/SW Visit: Initial Assessment    Primary Care Provider verified and updated as needed: Yes   Readmission within the last 30 days: previous discharge plan unsuccessful   Return Category: New Diagnosis  Reason for Consult: discharge planning  Advance Care Planning:            Communication Assessment  Patient's communication style: spoken language (English or Bilingual)    Hearing Difficulty or Deaf: no   Wear Glasses or Blind: no    Cognitive  Cognitive/Neuro/Behavioral: WDL  Level of Consciousness: alert  Arousal Level: opens eyes spontaneously  Orientation: oriented x 4  Mood/Behavior: cooperative  Best Language: 0 - No aphasia  Speech: clear    Living Environment:   People in home: alone (SO stays with patient on weekends but otherwise alone)     Current living Arrangements: house (townhouse)      Able to return to prior arrangements:  (unclear)       Family/Social Support:  Care provided by: self  Provides care for: no one  Marital Status: Single  Significant Other       Sander  Description of Support System:      Support Assessment: Lacks necessary supervision and assistance, Lacks adequate physical care    Current Resources:   Patient receiving home care services:       Community Resources:    Equipment currently used at home: none  Supplies currently used at home:      Employment/Financial:  Employment Status: retired        Financial Concerns:             Lifestyle & Psychosocial Needs:  Social Determinants of Health     Tobacco Use: Low Risk      Smoking Tobacco Use: Never Smoker     Smokeless Tobacco Use: Never Used   Alcohol Use: Not on file   Financial Resource Strain: Not on file   Food Insecurity: Not on file   Transportation Needs: Not on file   Physical Activity: Not on file   Stress: Not on file   Social Connections: Not on file   Intimate Partner Violence: Not on file    Depression: Not at risk     PHQ-2 Score: 2   Housing Stability: Not on file       Functional Status:  Prior to admission patient needed assistance:              Mental Health Status:          Chemical Dependency Status:                Values/Beliefs:  Spiritual, Cultural Beliefs, Moravian Practices, Values that affect care:                 Additional Information:  Consult for discharge planning. Patient admitted on 3/10/22 for ETOH withdrawal and non traumatic rhabdomyolysis  and a tentative discharge date of TBD.  Writer reviewed chart and TCU or 24/7 supervision at discharge recommendations at discharge. Writer met with patient and daughter Nery at bedside and introduced self and role.     Patient lives in a town home alone. She has a significant other who stays with her on the weekends but is otherwise alone. Patient's daughter Nery has concerns that patient has been drinking since her last hospitalization and feels that at this time patient cannot return home. Writer discussed TCU recommendations and both agree with this plan. Writer pulled up Medicare.gov in patient room and referrals will be [facility] to [facility], [facility], [facility], [facility].    Nery would like patient to meet with chemical dependency and when asked, patient also agrees as she feels she needs treatment. Nery also has concerns that patient may also have some unknown mental health conditions contributing to patient drinking and would like patient to meet with psychiatry as well. Writer explained that if patient is interested in an inpatient program, she will need to be independent with cares as they do not have a skilled treatment program and both patient and daughter understand. Nery noted that the family will help patient determine next steps and if patient can return to her home or if alternative plans will be made for after TCU and rehab.          NICOLE Tenorio

## 2022-03-12 NOTE — PROGRESS NOTES
[facility]    Medicine Progress Note - Hospitalist Service    Date of Admission:  3/10/2022    Assessment & Plan        Kavitha Headley is a 76 year old female with past medical history significatnt for alcohol use disorder admitted on 3/10/2022 after a mechanical fall, She is found to be in alcohol withdrawal.     Mechanical Fall   Elevated CK  The patient reports that two days ago she got up to use the bathroom, slipped and fell. She was apparently unable to get up on her own but was able to crawl into the other room to get her phone to call EMS. She does have scattered bruising/scrapes on her knees and elbows. Her CK is mildly elevated to 460. No other apparent injuries     discontinued IV fluids    CK is now normal (183)    PT/OT consultation     Alcohol use disorder   Alcohol withdrawal   Pt tachycardic and tremulous on arrival to the ED. She usually has about 1 pint of demetrice daily. Her last drink was reportedly two days ago prior to her fall. She continues to be tremulous.    CIWA with PRN ativan     Thiamine/folate multivitamin     Chem Dep consult     Psychiatry consult requested    Dyspnea   ?Hx of asthma/COPD  Pt notes some shortness of breath. O2 sats are stable on room air, but she is clearly dyspneic on examination. She is on Dulera, albuterol and duonebs PTA so presumably has some underlying asthma/COPD, but this is not clearly documented int he medical record.     CXR shows only low lung volumes (normal heart size, no infiltrates)    BNP is normal, nothing for CHF on CXR.     Age adjusted d-dimer (0.1 x age) is normal, does not need further evaluation for PE    Continue PTA dulera, albuterol, duonebs PRN      See further discussion below, re: concern for severe aortic stenosis contributing to GRULLON    Hyponatremia: Na = 132 mmol/L (Ref range: 133 - 144 mmol/L) on admission, will monitor as appropriate    Hypomagnesemia     Mag replacement per protocol     Mild leukocytosis  "  WBC 13.6. no clear signs/symptoms of infection     Monitor WBC    UA shows many bacteria; urine culture shows >100,000 CFU/mL Escherichia coli     Add Ceftriaxone, follow up sensitivities    Normocytic Anemia   hgb is 11.1 on admission which is actually higher than her baseline of around 8-10. Likely some component of hemoconcentration in the setting of dehydration    Monitor hemoglobin    Severe aortic stenosis  Diastolic congestive heart failure   Pulmonary HTN    TTE from 2021 showed EF of 60-65% with elevated left ventricular filling pressures. Moderate to severe valvular aortic stenosis. TTE from 12/2021 could not assess aortic valve gradients, but moderate Pulmonary HTN was present.    She describes dyspnea on exertion, she thinks symptoms are progressive since a hospital stay in December    She last saw Dr. Ugarte in clinic on 11/24/2021.  Dr. Ugarte concludes, \"The aortic valve is calcified, peak transaortic velocity 3.3 m/sec, valve area 1.0 cm2, mean gradient 32-33 mmHg, velocity index is 0.3. I suspect she will require valve intervention in the next year or so.\"     Rechecked echo, but aortic valve gradient was not assessed    She has not had syncopal spells and has other reasons for dyspnea, including moderate pulmonary HTN, COPD     Depression, anxiety  Sleep disturbance     Continue PTA Celexa, Remeron, Seroquel, trazodone and hydroxyzine      Hypertension    Hold PTA losartan, resume PTA atenolol     Hyperlipidemia    Hold PTA pravachol given elevated CK      CKD Stage 2/3  Cr near 0.8-1 at baseline. Cr 0.90 on admit    Stable    Obesity: Estimated body mass index is 30.9 kg/m  as calculated from the following:    Height as of this encounter: 1.626 m (5' 4\").    Weight as of this encounter: 81.6 kg (180 lb)    Diet: Combination Diet Regular Diet Adult    DVT Prophylaxis: Pneumatic Compression Devices  [place] Catheter: Not present  Central Lines: None  Cardiac Monitoring: ACTIVE order. Indication: Alcohol " "withdrawal  Code Status: Full Code      Disposition Plan   Expected Discharge: 03/13/2022     Anticipated discharge location:  Awaiting care coordination huddle  Delays:    Pending improvement alcohol withdrawal, safe disposition plan available.  See PT note, \"if dc to home would need 24/7 supervision and min to mod A and FWW.\" She therefore needs TCU.       The patient's care was discussed with the Bedside Nurse and Patient.    No Hammer MD  Hospitalist Service  [facility]  Securely message with the Vocera Web Console (learn more here)  Text page via Sidecar Paging/Directory       Clinically Significant Risk Factors Present on Admission              # Obesity: Estimated body mass index is 30.9 kg/m  as calculated from the following:    Height as of this encounter: 1.626 m (5' 4\").    Weight as of this encounter: 81.6 kg (180 lb).       ______________________________________________________________________    Interval History   \" I am not having a great day.\"  Linda says she feels very anxious, she knows her symptoms are related to alcohol withdrawal.  She says she had her echo this morning and mentions her visit with Dr. Ugarte last fall.  She again mentions that she is very short of breath with any activity, denies chest pain, no GI complaints.    Data reviewed today: I reviewed all medications, new labs and imaging results over the last 24 hours. I personally reviewed the chest x-ray image(s) showing low lung volumes, bibasilar atelectasis.    Physical Exam   Vital Signs: Temp: 98.9  F (37.2  C) Temp src: Oral BP: 124/79 Pulse: 71   Resp: 18 SpO2: 95 % O2 Device: None (Room air)    Weight: 180 lbs 0 oz  Constitutional: Awake, alert, cooperative, no apparent distress  Respiratory: Clear to auscultation bilaterally, no crackles or wheezing  Cardiovascular: Regular rate and rhythm with harsh systolic murmur  GI: Normal bowel sounds, soft, non-distended, non-tender  Skin/Integumen: She has " scars overlying both knees consistent with prior TKA.  She has a skin tear on the left arm and a tiny abrasion on the right knee  Other: Mood is more restless and apprehensive today      Data   Recent Labs   Lab 03/12/22  0801 03/11/22  1744 03/11/22  0951 03/11/22  0645 03/10/22  2121 03/10/22  1454   WBC 7.5  --   --  7.1  --  13.6*   HGB 8.8*  --   --  8.3*  --  11.1*   MCV 88  --   --  88  --  87     --   --  276  --   --      --   --  137  --  132*   POTASSIUM 3.3* 4.0 3.1* 3.0*  --  3.5   CHLORIDE 106  --   --  104  --  93*   CO2  --   --   --  29  --  25   BUN  --   --   --  8  --  11   CR  --   --   --  0.89  --  0.90   ANIONGAP  --   --   --  4  --  14   BIRGIT  --   --   --  7.7*  --  9.0   GLC  --   --   --  98 134* 146*   ALBUMIN  --   --   --   --   --  3.0*   PROTTOTAL  --   --   --   --   --  6.8   BILITOTAL  --   --   --   --   --  0.7   ALKPHOS  --   --   --   --   --  111   ALT  --   --   --   --   --  23   AST  --   --   --   --   --  41   LIPASE  --   --   --   --   --  202     No results found for this or any previous visit (from the past 24 hour(s)).  Medications       aspirin  81 mg Oral Daily     atenolol  25 mg Oral Daily     citalopram  20 mg Oral Daily     fluticasone-vilanterol  1 puff Inhalation Daily     folic acid  1 mg Oral Daily     gabapentin  300 mg Oral TID     mirtazapine  15 mg Oral At Bedtime     multivitamin w/minerals  1 tablet Oral Daily     QUEtiapine  300 mg Oral At Bedtime     sodium chloride (PF)  3 mL Intracatheter Q8H     thiamine  100 mg Oral Daily

## 2022-03-13 LAB
BACTERIA UR CULT: ABNORMAL
BACTERIA UR CULT: ABNORMAL
MAGNESIUM SERPL-MCNC: 1.5 MG/DL (ref 1.6–2.3)
POTASSIUM BLD-SCNC: 4.2 MMOL/L (ref 3.4–5.3)

## 2022-03-13 PROCEDURE — 84132 ASSAY OF SERUM POTASSIUM: CPT | Performed by: INTERNAL MEDICINE

## 2022-03-13 PROCEDURE — 120N000001 HC R&B MED SURG/OB

## 2022-03-13 PROCEDURE — 250N000013 HC RX MED GY IP 250 OP 250 PS 637: Performed by: INTERNAL MEDICINE

## 2022-03-13 PROCEDURE — 250N000011 HC RX IP 250 OP 636: Performed by: INTERNAL MEDICINE

## 2022-03-13 PROCEDURE — 83735 ASSAY OF MAGNESIUM: CPT | Performed by: INTERNAL MEDICINE

## 2022-03-13 PROCEDURE — 250N000013 HC RX MED GY IP 250 OP 250 PS 637: Performed by: STUDENT IN AN ORGANIZED HEALTH CARE EDUCATION/TRAINING PROGRAM

## 2022-03-13 PROCEDURE — 99233 SBSQ HOSP IP/OBS HIGH 50: CPT | Performed by: INTERNAL MEDICINE

## 2022-03-13 PROCEDURE — 36415 COLL VENOUS BLD VENIPUNCTURE: CPT | Performed by: INTERNAL MEDICINE

## 2022-03-13 PROCEDURE — 99221 1ST HOSP IP/OBS SF/LOW 40: CPT | Performed by: INTERNAL MEDICINE

## 2022-03-13 RX ORDER — MAGNESIUM OXIDE 400 MG/1
400 TABLET ORAL 3 TIMES DAILY
Status: DISCONTINUED | OUTPATIENT
Start: 2022-03-13 | End: 2022-03-13

## 2022-03-13 RX ORDER — MAGNESIUM OXIDE 400 MG/1
400 TABLET ORAL 3 TIMES DAILY
Status: COMPLETED | OUTPATIENT
Start: 2022-03-13 | End: 2022-03-15

## 2022-03-13 RX ADMIN — QUETIAPINE FUMARATE 50 MG: 25 TABLET ORAL at 00:33

## 2022-03-13 RX ADMIN — THIAMINE HCL TAB 100 MG 100 MG: 100 TAB at 09:16

## 2022-03-13 RX ADMIN — GABAPENTIN 300 MG: 300 CAPSULE ORAL at 16:02

## 2022-03-13 RX ADMIN — GABAPENTIN 300 MG: 300 CAPSULE ORAL at 21:58

## 2022-03-13 RX ADMIN — LORAZEPAM 2 MG: 1 TABLET ORAL at 22:56

## 2022-03-13 RX ADMIN — TRAZODONE HYDROCHLORIDE 100 MG: 100 TABLET ORAL at 22:03

## 2022-03-13 RX ADMIN — GABAPENTIN 300 MG: 300 CAPSULE ORAL at 09:15

## 2022-03-13 RX ADMIN — FOLIC ACID 1 MG: 1 TABLET ORAL at 09:16

## 2022-03-13 RX ADMIN — CEFTRIAXONE SODIUM 1 G: 1 INJECTION, POWDER, FOR SOLUTION INTRAMUSCULAR; INTRAVENOUS at 17:24

## 2022-03-13 RX ADMIN — FUROSEMIDE 20 MG: 20 TABLET ORAL at 09:15

## 2022-03-13 RX ADMIN — MIRTAZAPINE 15 MG: 15 TABLET, FILM COATED ORAL at 21:58

## 2022-03-13 RX ADMIN — LORAZEPAM 2 MG: 1 TABLET ORAL at 09:30

## 2022-03-13 RX ADMIN — FLUTICASONE FUROATE AND VILANTEROL TRIFENATATE 1 PUFF: 200; 25 POWDER RESPIRATORY (INHALATION) at 12:09

## 2022-03-13 RX ADMIN — ATENOLOL 25 MG: 25 TABLET ORAL at 09:16

## 2022-03-13 RX ADMIN — ASPIRIN 81 MG: 81 TABLET, COATED ORAL at 09:15

## 2022-03-13 RX ADMIN — CITALOPRAM HYDROBROMIDE 20 MG: 10 TABLET ORAL at 09:15

## 2022-03-13 RX ADMIN — Medication 400 MG: at 16:02

## 2022-03-13 RX ADMIN — MULTIPLE VITAMINS W/ MINERALS TAB 1 TABLET: TAB at 12:13

## 2022-03-13 RX ADMIN — QUETIAPINE FUMARATE 300 MG: 300 TABLET, EXTENDED RELEASE ORAL at 21:58

## 2022-03-13 ASSESSMENT — ACTIVITIES OF DAILY LIVING (ADL)
ADLS_ACUITY_SCORE: 19
ADLS_ACUITY_SCORE: 17
ADLS_ACUITY_SCORE: 17
ADLS_ACUITY_SCORE: 19
ADLS_ACUITY_SCORE: 17
ADLS_ACUITY_SCORE: 19
ADLS_ACUITY_SCORE: 17
ADLS_ACUITY_SCORE: 19
ADLS_ACUITY_SCORE: 17
ADLS_ACUITY_SCORE: 19
ADLS_ACUITY_SCORE: 19

## 2022-03-13 NOTE — PLAN OF CARE
Shift summary [id number]:     Patient A&Ox4. Denies pain. Denies SOB. VSS ex slightly hypertensive 's, on room air. Tele SR with occasional PVC's. Scored 1 on both CIWA checks overnight for mild tremors. Requested PRN seroquel at bedtime. Slept well overnight.

## 2022-03-13 NOTE — PROGRESS NOTES
"[facility]    Medicine Progress Note - Hospitalist Service    Date of Admission:  3/10/2022    Assessment & Plan        Kavitha Headley is a 76 year old female with past medical history significatnt for alcohol use disorder admitted on 3/10/2022 after a mechanical fall, She is found to be in alcohol withdrawal.     Mechanical Fall   Elevated CK, not clinically significant  The patient reports that two days ago she got up to use the bathroom, slipped and fell. She was apparently unable to get up on her own but was able to crawl into the other room to get her phone to call EMS. She does have scattered bruising/scrapes on her knees and elbows. Her CK is mildly elevated to 460. No other apparent injuries     discontinued IV fluids    CK is now normal (183)    PT/OT consultation  They recommend, \"if dc to home would need 24/7 supervision.\"    Alcohol use disorder   Alcohol withdrawal   Pt tachycardic and tremulous on arrival to the ED. She usually has about 1 pint of demetrice daily. Her last drink was reportedly two days ago prior to her fall. She continues to be tremulous.    CIWA with PRN ativan     Thiamine/folate multivitamin     Chem Dep consult     Psychiatry consult requested.  Patient's daughter, Nery, \"has concerns that patient may also have some unknown mental health conditions contributing to patient drinking and would like patient to meet with psychiatry.\"  Patient agrees to this plan.    Dyspnea   ?Hx of asthma/COPD  Pt notes some shortness of breath. O2 sats are stable on room air, but she is clearly dyspneic on examination. She is on Dulera, albuterol and duonebs PTA but this is not clearly documented int he medical record.     CXR shows only low lung volumes (normal heart size, no infiltrates)    BNP is normal, nothing for CHF on CXR.     Daughter stresses that patient has never smoked and never had a diagnosis of asthma or COPD    Age adjusted d-dimer (0.1 x age) is normal, does " "not need further evaluation for PE    Continue PTA dulera, albuterol, duonebs PRN      See further discussion below, re: concern for severe aortic stenosis contributing to GRULLON    Hyponatremia: Na = 132 mmol/L (Ref range: 133 - 144 mmol/L) on admission, will monitor as appropriate    Hypomagnesemia     Mag replacement per protocol     Mild leukocytosis   WBC 13.6. no clear signs/symptoms of infection     Monitor WBC    UA shows many bacteria; urine culture shows >100,000 CFU/mL Escherichia coli     Added Ceftriaxone, follow up sensitivities     Normocytic Anemia   hgb is 11.1 on admission which is actually higher than her baseline of around 8-10. Likely some component of hemoconcentration in the setting of dehydration    Monitor hemoglobin    Severe aortic stenosis  Diastolic congestive heart failure   Pulmonary HTN    TTE from 2021 showed EF of 60-65% with elevated left ventricular filling pressures. Moderate to severe valvular aortic stenosis. TTE from 12/2021 could not assess aortic valve gradients, but moderate Pulmonary HTN was present.    She describes dyspnea on exertion, she thinks symptoms are progressive since a hospital stay in December    She last saw Dr. Ugarte in clinic on 11/24/2021.  Dr. Ugarte concludes, \"The aortic valve is calcified, peak transaortic velocity 3.3 m/sec, valve area 1.0 cm2, mean gradient 32-33 mmHg, velocity index is 0.3. I suspect she will require valve intervention in the next year or so.\"     Rechecked Echo.  It shows, mean gradient of 48 mmHg, with peak pressure 83.8, . calculated aortic valve area is 0.97 cm^2.\"In comparison to the previous report dated 10/20/2021, there has been an interval increase in transaortic gradients\"    Cardiology consult requested, consider initiating workup for AVR (TAVR or SAVR) this hospital stay    Depression, anxiety  Sleep disturbance     Continue PTA Celexa, Remeron, Seroquel, trazodone and hydroxyzine      Hypertension    Hold PTA losartan, resume " "PTA atenolol     Hyperlipidemia    Hold PTA pravachol given elevated CK      CKD Stage 2/3  Cr near 0.8-1 at baseline. Cr 0.90 on admit    Stable    Obesity: Estimated body mass index is 30.9 kg/m  as calculated from the following:    Height as of this encounter: 1.626 m (5' 4\").    Weight as of this encounter: 81.6 kg (180 lb)    Diet: Combination Diet Regular Diet Adult    DVT Prophylaxis: Pneumatic Compression Devices  Montelongo Catheter: Not present  Central Lines: None  Cardiac Monitoring: ACTIVE order. Indication: Alcohol withdrawal  Code Status: Full Code      Disposition Plan   Expected Discharge: 03/13/2022     Anticipated discharge location:  Awaiting care coordination huddle  Delays:    Pending improvement alcohol withdrawal, safe disposition plan available.  See PT note, \"if dc to home would need 24/7 supervision and min to mod A and FWW.\" She therefore needs TCU.      Total time spent:  > 35 minutes  Time spent counseling, coordination of care: 25 minutes including discussion with care team and daughter Nery Flores ([phone]), regarding alcohol abuse, UTI, aortic stenosis, also personal review and interpretation of labs and studies as noted above   No Hammer MD  Hospitalist Service  [facility]  Securely message with the Vocera Web Console (learn more here)  Text page via Impulse[employer]er Paging/Directory       Clinically Significant Risk Factors Present on Admission              # Obesity: Estimated body mass index is 30.9 kg/m  as calculated from the following:    Height as of this encounter: 1.626 m (5' 4\").    Weight as of this encounter: 81.6 kg (180 lb).       ______________________________________________________________________    Interval History   \" I was seeing little critters over here playing cards.\"  Patient indicates she has been having visual hallucinations.  She received some Ativan she is drowsy.  She denies chest pain, shortness of breath at rest, no GI " complaints.    Data reviewed today: I reviewed all medications, new labs and imaging results over the last 24 hours. I personally reviewed the chest x-ray image(s) showing low lung volumes, bibasilar atelectasis.    Physical Exam   Vital Signs: Temp: 98.6  F (37  C) Temp src: Oral BP: 131/68 Pulse: 74   Resp: 16 SpO2: 93 % O2 Device: None (Room air)    Weight: 180 lbs 0 oz  Constitutional: Drowsy, nods off easily  Respiratory: Clear to auscultation bilaterally, no crackles or wheezing  Cardiovascular: Regular rate and rhythm with harsh systolic murmur  GI: Normal bowel sounds, soft, non-distended, non-tender  Skin/Integumen: She has scars overlying both knees consistent with prior TKA.  She has a skin tear on the left arm and a tiny abrasion on the right knee  Other: Mood is drowsy, slightly confused      Data   Recent Labs   Lab 03/13/22  0817 03/12/22  2135 03/12/22  1454 03/12/22  0801 03/11/22  0951 03/11/22  0645 03/10/22  2121 03/10/22  1454   WBC  --   --   --  7.5  --  7.1  --  13.6*   HGB  --   --   --  8.8*  --  8.3*  --  11.1*   MCV  --   --   --  88  --  88  --  87   PLT  --   --   --  278  --  276  --   --    NA  --   --   --  137  --  137  --  132*   POTASSIUM 4.2 4.3 3.4 3.3*   < > 3.0*  --  3.5   CHLORIDE  --   --   --  106  --  104  --  93*   CO2  --   --   --  26  --  29  --  25   BUN  --   --   --  10  --  8  --  11   CR  --   --   --  0.88  --  0.89  --  0.90   ANIONGAP  --   --   --  5  --  4  --  14   BIRGIT  --   --   --  8.2*  --  7.7*  --  9.0   GLC  --   --   --  91  --  98 134* 146*   ALBUMIN  --   --   --   --   --   --   --  3.0*   PROTTOTAL  --   --   --   --   --   --   --  6.8   BILITOTAL  --   --   --   --   --   --   --  0.7   ALKPHOS  --   --   --   --   --   --   --  111   ALT  --   --   --   --   --   --   --  23   AST  --   --   --   --   --   --   --  41   LIPASE  --   --   --   --   --   --   --  202    < > = values in this interval not displayed.     Recent Results (from the  past 24 hour(s))   Echo Limited   Result Value    LVEF  60-65%    [facility]    [id number]  [id number]  [id number]  [id number]^CORRINE^GEORGIA^NICOLE     [facility]  Echocardiography Laboratory  [address] [place] [id number]     Name: DARWIN JASSO  MRN: [id number]  : 1945  Study Date: 2022 10:46 AM  Age: 76 yrs  Gender: Female  Patient Location: [facility]  Reason For Study: Aortic Valve Disorder  Ordering Physician: CANDIDO CASTLE  Referring Physician: CANDIDO CASTLE  Performed By: NII Mcdowell     BSA: 1.9 m2  Height: 64 in  Weight: 180 lb  HR: 73  BP: 124/79 mmHg  ______________________________________________________________________________  Procedure  Limited Portable Echo Adult. Optison (NDC #[id number]) given intravenously.  ______________________________________________________________________________  Interpretation Summary     1. The left ventricle is normal in size. Left ventricular systolic function is  normal. The visual ejection fraction is 60-65%. No regional wall motion  abnormalities noted. There is no thrombus seen in the left ventricle.  2. The right ventricle is normal size. The right ventricular systolic function  is normal.  3. The left atrium is moderate to severely dilated  4. Severe valvular aortic stenosis. The mean AoV pressure gradient is 48.0  mmHg. The peak AoV pressure gradient is 83.8 mmHg. The calculated aortic valve  are is 0.97 cm^2.  5. No pericardial effusion.  6. In comparison to the previous report dated 10/20/2021, there has been an  interval increase in transaortic gradients.  ______________________________________________________________________________  Left Ventricle  The left ventricle is normal in size. Left ventricular systolic function is  normal. The visual ejection fraction is 60-65%. No regional wall motion  abnormalities noted. There is no thrombus seen in the left ventricle.     Right Ventricle  The right ventricle is  normal size. The right ventricular systolic function is  normal.     Atria  The left atrium is moderate to severely dilated. Right atrium not well  visualized.     Mitral Valve  The mitral valve leaflets are mildly thickened. There is mild mitral annular  calcification. There is trace mitral regurgitation.     Tricuspid Valve  There is mild (1+) tricuspid regurgitation. The right ventricular systolic  pressure is approximated at 56.5 mmHg plus the right atrial pressure. Right  ventricular systolic pressure is elevated, consistent with moderate to severe  pulmonary hypertension.     Aortic Valve  There is mild (1+) aortic regurgitation. Severe valvular aortic stenosis. The  mean AoV pressure gradient is 48.0 mmHg. The peak AoV pressure gradient is  83.8 mmHg. The calculated aortic valve are is 0.97 cm^2.     Pulmonic Valve  The pulmonic valve is not well visualized.     Vessels  The aortic root is normal size. The ascending aorta is Mildly dilated.  Inferior vena cava not well visualized for estimation of right atrial  pressure.     Pericardium  There is no pericardial effusion.     Rhythm  Sinus rhythm was noted.  ______________________________________________________________________________  MMode/2D Measurements & Calculations  IVSd: 1.2 cm     LVIDd: 4.0 cm  LVIDs: 2.1 cm  LVPWd: 0.95 cm  FS: 46.8 %  LV mass(C)d: 139.3 grams  LV mass(C)dI: 74.5 grams/m2  Ao root diam: 3.1 cm  asc Aorta Diam: 3.9 cm  LVOT diam: 2.0 cm  LVOT area: 3.2 cm2  RWT: 0.47     Doppler Measurements & Calculations  MV max P.7 mmHg  MV mean P.2 mmHg  MV V2 VTI: 55.7 cm  MVA(VTI): 1.9 cm2  Ao V2 max: 457.8 cm/sec  Ao max P.8 mmHg  Ao V2 mean: 327.1 cm/sec  Ao mean P.0 mmHg  Ao V2 VTI: 107.1 cm  RENE(I,D): 0.97 cm2  RENE(V,D): 0.95 cm2  LV V1 max P.3 mmHg  LV V1 max: 134.8 cm/sec  LV V1 VTI: 32.3 cm  SV(LVOT): 103.7 ml  SI(LVOT): 55.4 ml/m2  TR max enrique: 375.9 cm/sec  TR max P.5 mmHg  AV Enrique Ratio (DI): 0.29  RENE Index  (cm2/m2): 0.52     ______________________________________________________________________________  Report approved by: Felipe Oakley 03/12/2022 12:16 PM           Medications       aspirin  81 mg Oral Daily     atenolol  25 mg Oral Daily     cefTRIAXone  1 g Intravenous Q24H     citalopram  20 mg Oral Daily     fluticasone-vilanterol  1 puff Inhalation Daily     folic acid  1 mg Oral Daily     furosemide  20 mg Oral Daily     gabapentin  300 mg Oral TID     mirtazapine  15 mg Oral At Bedtime     multivitamin w/minerals  1 tablet Oral Daily     QUEtiapine  300 mg Oral At Bedtime     sodium chloride (PF)  3 mL Intracatheter Q8H     thiamine  100 mg Oral Daily      No

## 2022-03-13 NOTE — PROGRESS NOTES
Pt A&O x4, VSS, up x1 with walker and GB. Voided in BR. Up in chair. Adequate I&O. CIWA score 8, PRN Ativan x1. K replacement x1 this shift. K is up to 4.3, will recheck it tomorrow morning.

## 2022-03-13 NOTE — CONSULTS
Triage and Transition - Consult and Liaison     Kavitha Headley  March 13, 2022    Session start: 12:00 pm  Session end: 12:34 pm  Session duration in minutes: 34 minutes  CPT utilized: 41827 - Brief diagnostic assessment (modifier 52)  Patient was seen virtually ([employer] cart or other teleconferencing device).    Reason for consult: Psychiatry consult was requested due to alcohol use and possibly underlying mental health concerns. Patient was seen by [facility] Consult & Liaison team.     Identifying information: Linda is 76 year old White  female   followed related to alcohol withdrawal and a fall. Patient lives in a town home alone. She has a significant other who stays with her on the weekends but is otherwise alone.      Presenting problem (including symptoms, strengths risks, resources used):  Patient is followed by [facility], Dr. Ackerman, appears she sees about once every 3 months. Saw doctor in September. Patient no showed in December and January, did see provider in February. They recommended continuing campral, taking 300mg seroquel every night. Also taking trazodone, melatonin, valerian root, and L-tryotophan to help with her sleep. Discussed withdrawal symptoms and need to go to ER if severe. They also recommended further support for mental health/substance use besides just meeting with doctor. Per records, patient stopped taking seroquel in mid February due to concern it was causing some symptoms (thought she had TD- was told likely more related to withdrawal) Was recommended to go to ED. At that time, she stated she would think about it. Patient did not have any follow up/ED visit until this most recent visit on the 11th.     Reports struggling with anxiety and depression throughout her life, with symptoms worsening when her  passed away about 10 years ago. This was an extremely stressful time for patient,  had lots of medical appointments. 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 Patient very much enjoyed this work - the program was closed down and she was no longer able to continue. Feels she lost a part of herself.      In individual therapeutic contact with patient today, patient presents with flat affect, depressed mood. Patient is minimally responsive and engaged in conversation. Safety concerns are not present today.Patient reports she is feeling tired and weak. As she has experienced during previous episodes of withdrawal, she also experienced auditory hallucinations that may have been a manifestation of delirium as well. These hallucinations have resolved.  Patient does report feeling more depressed recently. Patient denies concern regarding sleep and appetite, she does report feeling down/sad a lot of the time. When daughter discusses state of patient's home (unclean, food/garbage on floor and stuffed into couch, liquid spilt and not cleaned up), she states she is \"lazy\", however, daughter states this has significantly worsened. Patient reports no thoughts of suicide. Again, daughter reports she feels patient has thoughts about it or sometimes is so depressed that she doesn't want to live anymore, however, patient again declines.     Patient reports coping with depression by using alcohol. She states she drinks daily. She reports drinking 4-5 glasses of demetrice, stating she does not measure amount she is drinking, so she is unable to quantify how much alcohol this actually is. She reports drinking this heavily since August, after 3 months of sobriety. Patient reports desire to quit and complete inpatient treatment.    Mental Status Exam   Affect: Flat  Appearance: Appropriate   Attention Span/Concentration: Attentive    Eye Contact: Engaged  Fund of Knowledge: Appropriate   Language /Speech Content: Fluent  Language /Speech Volume: Normal   Language /Speech " Rate/Productions: Minimally Responsive   Recent Memory: Intact  Remote Memory: Intact  Mood: Sad   Orientation:   Person: Yes   Place: Yes  Time of Day: Yes   Date: Yes   Situation (Do they understand why they are here?): Yes   Psychomotor Behavior: Normal   Thought Content: Clear  Thought Form: Intact    Current medications:   Current Facility-Administered Medications   Medication     acetaminophen (TYLENOL) tablet 650 mg    Or     acetaminophen (TYLENOL) Suppository 650 mg     albuterol (PROVENTIL HFA/VENTOLIN HFA) inhaler     aspirin EC tablet 81 mg     atenolol (TENORMIN) tablet 25 mg     cefTRIAXone (ROCEPHIN) 1 g vial to attach to  mL bag for ADULTS or NS 50 mL bag for PEDS     citalopram (celeXA) tablet 20 mg     flumazenil (ROMAZICON) injection 0.2 mg     fluticasone-vilanterol (BREO ELLIPTA) 200-25 MCG/INH inhaler 1 puff     folic acid (FOLVITE) tablet 1 mg     furosemide (LASIX) tablet 20 mg     gabapentin (NEURONTIN) capsule 300 mg     OLANZapine zydis (zyPREXA) ODT tab 5-10 mg    Or     haloperidol lactate (HALDOL) injection 2.5-5 mg     hydrOXYzine (ATARAX) tablet 25-50 mg     ipratropium - albuterol 0.5 mg/2.5 mg/3 mL (DUONEB) neb solution 3 mL     lidocaine (LMX4) cream     lidocaine 1 % 0.1-1 mL     LORazepam (ATIVAN) tablet 1-2 mg    Or     LORazepam (ATIVAN) injection 1-2 mg     magnesium oxide (MAG-OX) tablet 400 mg     melatonin tablet 5 mg     mirtazapine (REMERON) tablet 15 mg     multivitamin w/minerals (THERA-VIT-M) tablet 1 tablet     ondansetron (ZOFRAN-ODT) ODT tab 4 mg    Or     ondansetron (ZOFRAN) injection 4 mg     QUEtiapine (SEROquel) tablet 50 mg     QUEtiapine ER (SEROquel XR) 24 hr tablet 300 mg     sodium chloride (PF) 0.9% PF flush 3 mL     sodium chloride (PF) 0.9% PF flush 3 mL     thiamine (B-1) tablet 100 mg     traZODone (DESYREL) tablet 100 mg     Relevant history:  No hx of psychiatric hospitalizations or suicide attempts.  Does not have an individual therapist,  last saw therapist about 20 years ago. Did attend [facility] a couple years ago. Has trialed citalopram, bupropion, Trintellix, quetiapine, mirtazapine, trazodone, gabapentin, hydroxyzine, Ambien, and alprazolam. No hx of ECT or MH commitment.   Was a recreational drinker until her 50s, then alcohol use steadily increased, now drinking 8 to 10 drinks of demetrice per day. Hx of withdrawal symptoms, hallucinations. No hx of withdrawal seizures. Patient did complete treatment in March and had period of sobriety for 90 days. Patient discharged from [facility] in late April and apparently started their PHP program for one week but did not finish it.     Cultural Influences: None noted    Therapeutic intervention and progress:  Therapeutic intervention consisted of building therapeutic rapport, active listening, validation and assessing symptoms.     Collateral information:   Reviewed chart.  recently coordinated with daughter, Nery. Nery would like patient to meet with chemical dependency and when asked, patient also agrees as she feels she needs treatment. Nery also has concerns that patient may also have some unknown mental health conditions contributing to patient drinking and would like patient to meet with psychiatry as well.   Spoke with Nery during assessment, Daughter reports mom is extremely depressed. Wondering what plan is. States something has to be done. Reports mom home is a mess, doesn't clean up after herself. Alcohol use has worsened. Feels she needs inpatient support.     Diagnosis:   Substance-Related & Addictive Disorders Alcohol Use Disorder   303.90 (F10.20) Severe  , by history;  296.33 (F33.2) Major Depressive Disorder, Recurrent Episode, Severe _, by history;       Plan:     Patient reports she met with CD today, recommend IP chemical dependency treatment. I would also recommend residential treatment, specifically a dual diagnosis program to  address both patient's depressive symptoms and alcohol use. Patient would also benefit from engaging in step-down outpatient program after completing residential.     Patient will not continue to be followed by this service. Re-consult as needed.    Haydee Looney [facility]  Triage and Transition - Consult and Liaison   [phone]

## 2022-03-14 ENCOUNTER — APPOINTMENT (OUTPATIENT)
Dept: PHYSICAL THERAPY | Facility: CLINIC | Age: 77
DRG: 897 | End: 2022-03-14
Payer: COMMERCIAL

## 2022-03-14 LAB
ANION GAP SERPL CALCULATED.3IONS-SCNC: 4 MMOL/L (ref 3–14)
BUN SERPL-MCNC: 12 MG/DL (ref 7–30)
CALCIUM SERPL-MCNC: 8.6 MG/DL (ref 8.5–10.1)
CHLORIDE BLD-SCNC: 109 MMOL/L (ref 94–109)
CO2 SERPL-SCNC: 27 MMOL/L (ref 20–32)
CREAT SERPL-MCNC: 0.99 MG/DL (ref 0.52–1.04)
ERYTHROCYTE [DISTWIDTH] IN BLOOD BY AUTOMATED COUNT: 15.3 % (ref 10–15)
GFR SERPL CREATININE-BSD FRML MDRD: 58 ML/MIN/1.73M2
GLUCOSE BLD-MCNC: 94 MG/DL (ref 70–99)
HCT VFR BLD AUTO: 30.8 % (ref 35–47)
HGB BLD-MCNC: 9.7 G/DL (ref 11.7–15.7)
MAGNESIUM SERPL-MCNC: 1.4 MG/DL (ref 1.6–2.3)
MCH RBC QN AUTO: 29 PG (ref 26.5–33)
MCHC RBC AUTO-ENTMCNC: 31.5 G/DL (ref 31.5–36.5)
MCV RBC AUTO: 92 FL (ref 78–100)
PLATELET # BLD AUTO: 332 10E3/UL (ref 150–450)
POTASSIUM BLD-SCNC: 4.3 MMOL/L (ref 3.4–5.3)
RBC # BLD AUTO: 3.34 10E6/UL (ref 3.8–5.2)
SODIUM SERPL-SCNC: 140 MMOL/L (ref 133–144)
WBC # BLD AUTO: 6.9 10E3/UL (ref 4–11)

## 2022-03-14 PROCEDURE — 36415 COLL VENOUS BLD VENIPUNCTURE: CPT | Performed by: INTERNAL MEDICINE

## 2022-03-14 PROCEDURE — H0001 ALCOHOL AND/OR DRUG ASSESS: HCPCS

## 2022-03-14 PROCEDURE — 99233 SBSQ HOSP IP/OBS HIGH 50: CPT | Performed by: INTERNAL MEDICINE

## 2022-03-14 PROCEDURE — 80048 BASIC METABOLIC PNL TOTAL CA: CPT | Performed by: INTERNAL MEDICINE

## 2022-03-14 PROCEDURE — 83735 ASSAY OF MAGNESIUM: CPT | Performed by: INTERNAL MEDICINE

## 2022-03-14 PROCEDURE — 250N000013 HC RX MED GY IP 250 OP 250 PS 637: Performed by: STUDENT IN AN ORGANIZED HEALTH CARE EDUCATION/TRAINING PROGRAM

## 2022-03-14 PROCEDURE — 97116 GAIT TRAINING THERAPY: CPT | Mod: GP | Performed by: PHYSICAL THERAPY ASSISTANT

## 2022-03-14 PROCEDURE — 250N000011 HC RX IP 250 OP 636: Performed by: INTERNAL MEDICINE

## 2022-03-14 PROCEDURE — 85014 HEMATOCRIT: CPT | Performed by: INTERNAL MEDICINE

## 2022-03-14 PROCEDURE — 97530 THERAPEUTIC ACTIVITIES: CPT | Mod: GP | Performed by: PHYSICAL THERAPY ASSISTANT

## 2022-03-14 PROCEDURE — 250N000013 HC RX MED GY IP 250 OP 250 PS 637: Performed by: INTERNAL MEDICINE

## 2022-03-14 PROCEDURE — 120N000001 HC R&B MED SURG/OB

## 2022-03-14 RX ORDER — MAGNESIUM OXIDE 400 MG/1
400 TABLET ORAL 3 TIMES DAILY
Status: DISCONTINUED | OUTPATIENT
Start: 2022-03-14 | End: 2022-03-14

## 2022-03-14 RX ADMIN — ACETAMINOPHEN 650 MG: 325 TABLET, FILM COATED ORAL at 06:51

## 2022-03-14 RX ADMIN — MULTIPLE VITAMINS W/ MINERALS TAB 1 TABLET: TAB at 08:52

## 2022-03-14 RX ADMIN — FLUTICASONE FUROATE AND VILANTEROL TRIFENATATE 1 PUFF: 200; 25 POWDER RESPIRATORY (INHALATION) at 12:51

## 2022-03-14 RX ADMIN — ATENOLOL 25 MG: 25 TABLET ORAL at 08:52

## 2022-03-14 RX ADMIN — ASPIRIN 81 MG: 81 TABLET, COATED ORAL at 08:51

## 2022-03-14 RX ADMIN — Medication 400 MG: at 16:51

## 2022-03-14 RX ADMIN — THIAMINE HCL TAB 100 MG 100 MG: 100 TAB at 08:52

## 2022-03-14 RX ADMIN — GABAPENTIN 300 MG: 300 CAPSULE ORAL at 21:43

## 2022-03-14 RX ADMIN — FUROSEMIDE 20 MG: 20 TABLET ORAL at 08:52

## 2022-03-14 RX ADMIN — Medication 400 MG: at 21:43

## 2022-03-14 RX ADMIN — Medication 400 MG: at 08:52

## 2022-03-14 RX ADMIN — QUETIAPINE FUMARATE 300 MG: 300 TABLET, EXTENDED RELEASE ORAL at 21:43

## 2022-03-14 RX ADMIN — GABAPENTIN 300 MG: 300 CAPSULE ORAL at 16:51

## 2022-03-14 RX ADMIN — FOLIC ACID 1 MG: 1 TABLET ORAL at 08:52

## 2022-03-14 RX ADMIN — GABAPENTIN 300 MG: 300 CAPSULE ORAL at 08:51

## 2022-03-14 RX ADMIN — CITALOPRAM HYDROBROMIDE 20 MG: 10 TABLET ORAL at 08:53

## 2022-03-14 RX ADMIN — CEFTRIAXONE SODIUM 1 G: 1 INJECTION, POWDER, FOR SOLUTION INTRAMUSCULAR; INTRAVENOUS at 17:55

## 2022-03-14 RX ADMIN — MIRTAZAPINE 15 MG: 15 TABLET, FILM COATED ORAL at 21:43

## 2022-03-14 RX ADMIN — Medication 400 MG: at 01:50

## 2022-03-14 RX ADMIN — ACETAMINOPHEN 650 MG: 325 TABLET, FILM COATED ORAL at 16:51

## 2022-03-14 RX ADMIN — TRAZODONE HYDROCHLORIDE 100 MG: 100 TABLET ORAL at 21:43

## 2022-03-14 ASSESSMENT — ACTIVITIES OF DAILY LIVING (ADL)
ADLS_ACUITY_SCORE: 19

## 2022-03-14 NOTE — PROGRESS NOTES
Patient suffered an incident of ETOH withdrawal at the start of shift. Ativan administered. Emotional support provided. Patient remained calm after medication. Tolerated IV antibiotic ok. K and Mg protocol. PIV saline locked. Care team will continue to monitor.

## 2022-03-14 NOTE — PROGRESS NOTES
Writer and Charge RN spoke to lab again about the blood draw needed for magnesium. Lab says they will send someone.

## 2022-03-14 NOTE — PROGRESS NOTES
Care Management Follow Up    Length of Stay (days): 4    Expected Discharge Date: 03/15/2022     Concerns to be Addressed:       Patient plan of care discussed at interdisciplinary rounds: Yes    Anticipated Discharge Disposition:       Anticipated Discharge Services:    Anticipated Discharge DME:      Patient/family educated on Medicare website which has current facility and service quality ratings:    Education Provided on the Discharge Plan:    Patient/Family in Agreement with the Plan: yes    Referrals Placed by CM/SW: Post Acute Facilities  Private pay costs discussed: Not applicable    Additional Information:  Left messages for [facility], [facility], and [facility] to see if any tcu beds are available for patient.    Received an email from Bere Mejia (chemical dependency evaluator) saying that patient is agreeable to treatment. She said that she's going to send referrals to [facility] and [facility] since they do CD treatment and TCU. Writer sent referrals via DOD to [facility] and [facility]. Janeth ([place] Liaison) said she would send to [facility] for them to review.    [facility] said that patient would need to commit to paying the $184 a day on the 21st day. Talked to patient and she said she would think about and to have SW check on her tomorrow with her decision. Messages Janeth this information.    Will continue to follow.    NICOLE Torres

## 2022-03-14 NOTE — CONSULTS
[facility]    Cardiology Consultation     Date of Admission:  3/10/2022    Assessment & Plan   Kavitha Headley is a 76 year old female who was admitted on 3/10/2022. I was asked to see the patient for a history of moderate to severe aortic stenosis.    Patient is currently withdrawing from alcohol and had a mechanical fall.  She has a history of dyspnea and in the setting of her alcohol withdrawal she appears dyspneic when she was being admitted.    The patient has a history of moderate to severe aortic stenosis with a normal LVEF.    The primary service commented that the patient could have evaluation for SAVR or TAVR as an inpatient.  The reason for admission is most likely not related to patient's aortic stenosis is more so related to a mechanical fall and alcohol withdrawal.  Therefore inpatient evaluation for SAVR/TAVR is likely not indicated.    Problems:  severe aortic stenosis  Mechanical fall  Muscle injury due to mechanical fall  Alcohol dependency alcohol withdrawal  Hypertension  Hyperlipidemia  CKD    Plan:  Patient should complete withdrawal from alcohol be treated for alcohol dependency if possible.    Echocardiogram performed as an inpatient is not consistent with severe aortic stenosis with a mean gradient of 48 mmHg and a valve area of 0.97.  Personally I would defer evaluation for SAVR or TAVR to the outpatient setting after the patient completes withdrawal from alcohol and is more stable.    We can continue aspirin, atenolol for now    Agree with gentle diuresis with oral Lasix    Obtain daily electrolytes and keep potassium above 4 and magnesium above 2 to prevent cardiac arrhythmias in setting of alcohol withdrawal and valvular heart disease.    Monico Marlow MD     Code Status    Full Code    Reason for Consult   Reason for consult: Severe AS    Primary Care Physician   Nayeli Castorena    Chief Complaint       History is obtained from the  patient    History of Present Illness   Kavitha Headley is a 76 year old female who was admitted on 3/10/2022. I was asked to see the patient for a history of moderate to severe aortic stenosis.    Patient is currently withdrawing from alcohol and had a mechanical fall.  She has a history of dyspnea and in the setting of her alcohol withdrawal she appears dyspneic when she was being admitted.    The patient has a history of moderate to severe aortic stenosis with a normal LVEF.    The primary service commented that the patient could have evaluation for SAVR or TAVR as an inpatient.  The reason for admission is most likely not related to patient's aortic stenosis is more so related to a mechanical fall and alcohol withdrawal.  Therefore inpatient evaluation for SAVR/TAVR is likely not indicated.    Past Medical History   I have reviewed this patient's medical history and updated it with pertinent information if needed.   Past Medical History:   Diagnosis Date     Alcohol abuse     Long term alcohol abuse. Abstinenet since October 2019.     Anxiety disorder      Ascending aorta dilatation (H)      Bariatric surgery status 1996?    gastric bypass, [facility] and     Benign hypertension      Chronic insomnia      Chronic pain syndrome     Chronic back and neck pain, chronic pain due to osteoarthritis multiple joints     Coronary artery disease involving native coronary artery of native heart without angina pectoris 10/16/2018    Minimal coronary artery disease on coronary angiogram in 2015.      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     Major depressive disorder, single episode, severe, without mention of psychotic behavior      Mixed hyperlipidemia      Moderate  aortic stenosis 05/2014    moderate to severe aortic valve stenosis     Pelvic relaxation disorder     Surgical intervention for cystocele/rectocele 3,11/2012     Personal history of urinary calculi 6/2006    left ureteral stone,lithotripsy     Psoriasis      PVC (premature ventricular contraction)      Spinal stenosis      Stage III chronic kidney disease (H) 2005     SVT (supraventricular tachycardia) (H)     likely atrial tachycardia       Past Surgical History   I have reviewed this patient's surgical history and updated it with pertinent information if needed.  Past Surgical History:   Procedure Laterality Date     APPENDECTOMY  3/2004    incidental     ARTHRODESIS TOE(S) Right 1/31/2020    Procedure: RIGHT FIRST METATARSAL PHALANGEAL JOINT ARTHRODESIS;  Surgeon: Steven Reyes MD;  Location:  OR     C MEDIASTINOSCOPY W OR WO BIOPSY  2/2008    Videomediastinoscopy and, for mediastinal adenopathy -reactive lymphoid hyperplasia     CARPAL TUNNEL RELEASE RT/LT  10/2010    Carpometacarpal excisional arthroplasty with a fascial autograft and APL suspension sling (87068). 2. Left thumb metacarpophalangeal joint fusion with autologous bone graft (40193). 3. Left endoscopic carpal tunnel release      CHOLECYSTECTOMY, LAPOROSCOPIC  11/2010    Cholecystectomy, Laparoscopic     COLONOSCOPY N/A 9/8/2016    Procedure: COMBINED COLONOSCOPY, SINGLE OR MULTIPLE BIOPSY/POLYPECTOMY BY BIOPSY;  Surgeon: Moe Barlow MD;  Location:  GI     CYSTOCELE REPAIR  11/2012    davinc laparoscopic sacrocolpopexy, enterocele repair, lysis of adhesions, placement of retropubic mid urethral sling, cystoscopy     CYSTOSCOPY, LITHOTRIPSY, COMBINED  6/2006    Left extracorporeal shock wave lithotripsy, cystoscopy, left ureteral stent placement.     CYSTOSCOPY, REMOVE STENT(S), COMBINED  7/2006    Cystoscopy, removal of left ureteral stent, retrograde pyelography, flexible and rigid ureteroscopy and holmium laser lithotripsy, basket  removal of stone fragments, ureteral stent placement.      ENDOSCOPIC ULTRASOUND UPPER GASTROINTESTINAL TRACT (GI) N/A 6/12/2017    Procedure: ENDOSCOPIC ULTRASOUND, ESOPHAGOSCOPY / UPPER GASTROINTESTINAL TRACT (GI);  ENDOSCOPIC ULTRASOUND, ESOPHAGOSCOPY / UPPER GASTROINTESTINAL TRACT (GI);  Surgeon: Parth Graham MD;  Location:  GI     HERNIA REPAIR  4/2012    bilateral augmentation mastopexy, ventral hernia repair, and medial thigh liposuction on 04/06/2012.      HYSTERECTOMY VAGINAL, BILATERAL SALPINGO-OOPHERECTOMY, COMBINED  1998    due to myoma and bleeding     JOINT REPLACEMENT, HIP RT/LT  4/2004    right total hip arthroplasty     LAPAROTOMY, LYSIS ADHESIONS, COMBINED  3/2004    lysis adhesions, ventral hernia repair, appendectomy incidentally     LYMPH NODE BIOPSY  4/2008    right axillary, reactive follicular and paracortical hyperplasia.     MAMMOPLASTY AUGMENTATION BILATERAL  4/2012     REPAIR HAMMER TOE Right 1/31/2020    Procedure: WITH SECOND AND THIRD CLAW TOE RECONSTRUCTION;  Surgeon: Steven Reyes MD;  Location:  OR     REVISE RECONSTRUCTED BREAST  6/7/2012    Left breast capsulotomy.      ZZC GASTRIC BYPASS,OBESE<100CM ARIANNA-EN-Y  1996     ZZC REPAIR OF RECTOCELE  3/2012     ZZ TOTAL KNEE ARTHROPLASTY  12/2005    left        Prior to Admission Medications   Prior to Admission Medications   Prescriptions Last Dose Informant Patient Reported? Taking?   MISC NATURAL PRODUCTS PO Past Week at Unknown time  Yes Yes   Sig: Take 1 capsule by mouth daily B-glucan capsule   MISC NATURAL PRODUCTS PO Past Week at Unknown time  Yes Yes   Sig: Take 1 tablet by mouth daily Keto - Ketogenesis tablets   MISC NATURAL PRODUCTS PO Past Week at Unknown time  Yes Yes   Sig: Take 1 tablet by mouth daily Exipure Homeopathic   MISC NATURAL PRODUCTS PO Past Week at Unknown time  Yes Yes   Sig: Take 1 tablet by mouth daily L-Tryptophan 1000 mg daily   Multiple Vitamins-Minerals (CENTRUM SILVER) per tablet  Past Week at Unknown time Self Yes Yes   Sig: Take 1 tablet by mouth daily   QUEtiapine (SEROQUEL XR) 150 MG TB24 24 hr tablet Past Week at Unknown time  No Yes   Sig: Take 2 tablets (300 mg) by mouth At Bedtime   QUEtiapine (SEROQUEL) 50 MG tablet Past Week at Unknown time Self No Yes   Sig: Take 1 tablet (50 mg) by mouth nightly as needed (Anxiety, insomnia, hallucinations)   acamprosate (CAMPRAL) 333 MG EC tablet Past Week at Unknown time  No Yes   Sig: Take 2 tablets (666 mg) by mouth 3 times daily   acetaminophen (TYLENOL) 500 MG tablet Past Week at Unknown time Self Yes Yes   Sig: Take 1,000 mg by mouth 2 times daily as needed for pain   albuterol (PROAIR HFA/PROVENTIL HFA/VENTOLIN HFA) 108 (90 Base) MCG/ACT inhaler Past Week at Unknown time  No Yes   Sig: Inhale 2 puffs into the lungs every 6 hours as needed for wheezing   aspirin 81 MG EC tablet Past Week at Unknown time Self Yes Yes   Sig: Take 81 mg by mouth daily   atenolol (TENORMIN) 25 MG tablet Past Week at Unknown time Self Yes Yes   Sig: Take 1 tablet (25 mg) by mouth daily   benzonatate (TESSALON) 100 MG capsule Past Week at Unknown time  Yes Yes   Sig: Take 100 mg by mouth 3 times daily as needed for cough   citalopram (CELEXA) 20 MG tablet Past Week at Unknown time  No Yes   Sig: Take 1 tablet (20 mg) by mouth daily   diclofenac (VOLTAREN) 50 MG EC tablet Past Week at Unknown time  No Yes   Sig: Take 1 tablet (50 mg) by mouth 2 times daily as needed for moderate pain   folic acid (FOLVITE) 1 MG tablet Past Week at Unknown time Self Yes Yes   Sig: Take 1 mg by mouth daily   furosemide (LASIX) 20 MG tablet Past Week at Unknown time Self No Yes   Sig: TAKE 1 TABLET DAILY   gabapentin (NEURONTIN) 300 MG capsule Past Week at Unknown time  No Yes   Sig: Take 1 capsule (300 mg) by mouth 3 times daily   hydrOXYzine (ATARAX) 25 MG tablet Past Week at Unknown time  No Yes   Sig: Take 1-2 tablets (25-50 mg) by mouth every 6 hours as needed for anxiety or  other (sleep)   ipratropium - albuterol 0.5 mg/2.5 mg/3 mL (DUONEB) 0.5-2.5 (3) MG/3ML neb solution Past Week at Unknown time  No Yes   Sig: NEBULIZE THE CONTENTS OF ONE VIAL FOUR TIMES A DAY   losartan (COZAAR) 25 MG tablet Past Week at Unknown time  No Yes   Sig: Take 1 tablet (25 mg) by mouth daily   methocarbamol (ROBAXIN) 500 MG tablet Past Week at Unknown time  No Yes   Sig: Take 1 tablet (500 mg) by mouth 2 times daily   mirtazapine (REMERON) 15 MG tablet Past Week at Unknown time  No Yes   Sig: Take 1 tablet (15 mg) by mouth At Bedtime   mometasone-formoterol (DULERA) 200-5 MCG/ACT inhaler Past Week at Unknown time  No Yes   Sig: Inhale 2 puffs into the lungs 2 times daily   polyethylene glycol (MIRALAX) 17 GM/Dose powder Past Week at Unknown time Self Yes Yes   Sig: Take 17 g by mouth 2 times daily as needed for constipation   pravastatin (PRAVACHOL) 20 MG tablet Past Week at Unknown time  No Yes   Sig: Take 1 tablet (20 mg) by mouth daily   senna-docusate (SENOKOT-S/PERICOLACE) 8.6-50 MG tablet Past Week at Unknown time Self No Yes   Sig: Take 1 tablet by mouth 2 times daily   traZODone (DESYREL) 100 MG tablet Past Week at Unknown time  No Yes   Sig: TAKE 1 TABLET NIGHTLY AS NEEDED FOR SLEEP   valACYclovir (VALTREX) 1000 mg tablet More than a month at Unknown time  No Yes   Sig: Take 2 tablets (2,000 mg) by mouth 2 times daily   Patient taking differently: Take 2,000 mg by mouth 2 times daily as needed       Facility-Administered Medications: None     Allergies   Allergies   Allergen Reactions     Bactrim [Sulfamethoxazole W/Trimethoprim] Hives     Codeine Itching     NAUSEA     Morphine Itching     NAUSEA       Social History   I have reviewed this patient's social history and updated it with pertinent information if needed. Kavitha GARCIA Headley  reports that she has never smoked. She has never used smokeless tobacco. She reports current alcohol use of about 63.0 standard drinks of alcohol per week. She  reports that she does not use drugs.    Family History   I have reviewed this patient's family history and updated it with pertinent information if needed.   Family History   Problem Relation Age of Onset     Substance Abuse Father      Cancer Father         throat and lung mets     Diabetes No family hx of      Coronary Artery Disease No family hx of      Cerebrovascular Disease No family hx of        Review of Systems   The 12 point Review of Systems is negative other than noted in the HPI or here.     Physical Exam   Temp: 97.4  F (36.3  C) Temp src: Oral BP: (!) 154/74 Pulse: 87   Resp: 19 SpO2: 95 % O2 Device: None (Room air)    Vital Signs with Ranges  Temp:  [97.4  F (36.3  C)-98.7  F (37.1  C)] 97.4  F (36.3  C)  Pulse:  [68-87] 87  Resp:  [16-19] 19  BP: (131-154)/(68-74) 154/74  SpO2:  [93 %-96 %] 95 %  180 lbs 0 oz    GENERAL APPEARANCE: Alert and oriented x3. No acute distress.  SKIN: Inspection of the skin reveals no rashes, ulcerations, warm, dry  NECK: Supple and symmetric.  normal JVP  LUNGS: Clear breath sounds throughout bilaterally, no wheezes, no rhonchi  CARDIOVASCULAR: S1, S2, regular rate and rhythm with 3/6 systolic murmur at the RUSB  ABDOMEN: Soft, non-tender, non-distended with normal bowel sounds.  No ascites noted.  EXTREMITIES: trace edema.  NEUROLOGIC:  Normal mood and affect.  Sensation to touch was normal.      Data   Results for orders placed or performed during the hospital encounter of 03/10/22 (from the past 24 hour(s))   Potassium   Result Value Ref Range    Potassium 4.2 3.4 - 5.3 mmol/L   Magnesium   Result Value Ref Range    Magnesium 1.5 (L) 1.6 - 2.3 mg/dL   Magnesium   Result Value Ref Range    Magnesium 1.4 (L) 1.6 - 2.3 mg/dL     *Note: Due to a large number of results and/or encounters for the requested time period, some results have not been displayed. A complete set of results can be found in Results Review.

## 2022-03-14 NOTE — CONSULTS
3/14/2022    Pt has been interviewed via telephone and CD Consult has been completed. Email has been sent to unit SW with CD treatment info and SREEKANTH's for patient to sign. Referral has been sent to [facility]. Referrals can be sent to TCU's if SW refers to them.     Recommendations:      1. Abstain from all non-prescribed mood-altering substances  2. Take all medications as prescribed  3. Enter and complete a  residential or inpatient treatment program  4. Follow all recommendations upon discharge from treatment. Recommendations may include, but are not limited to: extended treatment, outpatient treatment and/or sober housing.        5.   Follow all recommendations of your medical providers     Clinical Substantiation:       Pt meets criteria for Alcohol use disorder-severe. Pt reports she would like to attend inpatient CD treatment. Pt reports she has completed CD treatment with [facility] in the past and would be willing to attend their program again. Pt reports she has sober support. Pt reports cravings, withdrawals, and an inability to control her use.      Referrals/ Alternatives:     [place] [address]  Phone:  [phone]  Fax:  [phone]     [facility] [address]           Phone: [phone]  Fax: [phone]        [facility]- Outpatient treatment center  [address] [place] [id number]  Stef Pablo  PH: [phone]  Fax: [phone]     [facility] - IP part of [facility] (men and women)  Phone: [phone]  Fax: [phone] for [place] (inpatient)  Fax: [phone] for [place] (inpatient)  Per fast tracker:  Phone: [phone]  Fax: [phone]        [facility] consult completed by: DAVON Brar  Phone Number: [phone]  E-mail Address: [email or web address]  [address] [place] [address]
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
## 2022-03-14 NOTE — PROGRESS NOTES
Writer called lab to follow up on lab request (magnesium protocol) that was placed during AM shift. Lab did not receive the order on their end. Writer called to have blood draw at 2200.     Writer called lab again at approx. 2300  to verify they received order. Lab says they will be on their way up.

## 2022-03-14 NOTE — PLAN OF CARE
Pt is A&Ox4. VSS. Tele; NSR. Voiding adequately in toilet. Up with 1 assist; walker and gait belt. Last CIWA score 1. PRN ativan (CIWA score 16)  and trazodone given. Pt slept well throughout shift.

## 2022-03-14 NOTE — PROGRESS NOTES
Brief cardiology note:  Chart reviewed. Note plans for likely discharge to inpatient CD facility.    Would plan for outpatient TAVR work up with the structural team following discharge.once she is through withdrawal and more stable     Please call with questions.   JURGEN Rodríguez PA-C 4:22 PM 3/14/2022

## 2022-03-14 NOTE — PROGRESS NOTES
[facility]    HOSPITALIST PROGRESS NOTE :   --------------------------------------------------    Date of Admission:  3/10/2022    Cumulative Summary: Kavitha Headley is a 76 year old female with past medical history significant for alcohol use disorder, recent hospitalization with upper respiratory infection and bronchitis, chronic anemia secondary to alcoholism, diastolic congestive heart failure, pulmonary hypertension and severe aortic stenosis with history of depression and anxiety who was admitted on March 10 when she presented after a mechanical fall and was also in alcohol withdrawal.    Assessment & Plan     Mechanical Fall   Elevated CK, not clinically significant  The patient reports that two days ago she got up to use the bathroom, slipped and fell. She was apparently unable to get up on her own but was able to crawl into the other room to get her phone to call EMS. She does have scattered bruising/scrapes on her knees and elbows. Her CK is mildly elevated to 460. No other apparent injuries     --Patient care was assumed this morning, patient was seen and examined daughter also present at the bedside  --Patient is now off fluids, CKs in normal range  --Patient has been evaluated by therapies, patient will benefit from getting discharged to TCU, patient is also interested in inpatient chemical dependency program, see below     Alcohol use disorder   Alcohol withdrawal   Pt tachycardic and tremulous on arrival to the ED. She usually has about 1 pint of demetrice daily. Her last drink was reportedly two days ago prior to her fall. She continues to be tremulous.    --Long discussion with patient and her daughter, patient thinks that she has hit the rock bottom, would like to stop drinking, aware about previous failures due to craving and also associated mental illness with depression and anxiety.  --Chemical dependency and psych has been consulted  -- are consulted, they  are already looking into inpatient chemical dependency program, hoping that patient can be discharged to one of the facilities after the hospitalization, otherwise patient might need to do inpatient rehab and then go to inpatient treatment when bed is available.  --Patient has only been discovering 1 when CIWA scoring is done at bedside, as needed Ativan is available.  --Continue Thiamine/folate multivitamin     Dyspnea   ?Hx of asthma/COPD  Pt notes some shortness of breath. O2 sats are stable on room air, but she is clearly dyspneic on examination. She is on Dulera, albuterol and duonebs PTA but this is not clearly documented int he medical record.     --CXR showed only low lung volumes (normal heart size, no infiltrates)  --BNP is normal, nothing for CHF on CXR.  --Daughter stresses that patient has never smoked and never had a diagnosis of asthma or COPD  --Age adjusted d-dimer (0.1 x age) is normal, does not need further evaluation for PE --Continue PTA dulera, albuterol, duonebs PRN   --See further discussion below, re: concern for severe aortic stenosis contributing to GRULLON, long discussion with patient and her daughter at this time I feel that her symptoms are most likely secondary to combination of her diastolic heart failure, pulmonary hypertension with underlying valvular disease, patient does not have any long history of lung disease and she was a started on inhalers when she was admitted to the hospital in December 2021 when she was evaluated by pulmonary.     Hyponatremia: Na = 132 mmol/L (Ref range: 133 - 144 mmol/L) on admission, will monitor as appropriate     Hypomagnesemia   --Mag replacement per protocol      Mild leukocytosis   WBC 13.6. no clear signs/symptoms of infection     -- Monitor WBC  --UA shows many bacteria; urine culture shows >100,000 CFU/mL Escherichia coli   --Added Ceftriaxone, follow up sensitivities , E. coli is pansensitive, plan to switch to oral antibiotics on  "discharge.     Normocytic Anemia   hgb is 11.1 on admission which is actually higher than her baseline of around 8-10. Likely some component of hemoconcentration in the setting of dehydration  -- Monitor hemoglobin     Severe aortic stenosis  Diastolic congestive heart failure   Pulmonary HTN    TTE from 2021 showed EF of 60-65% with elevated left ventricular filling pressures. Moderate to severe valvular aortic stenosis. TTE from 12/2021 could not assess aortic valve gradients, but moderate Pulmonary HTN was present.  --She describes dyspnea on exertion, she thinks symptoms are progressive since a hospital stay in December  --She last saw Dr. Ugarte in clinic on 11/24/2021.  Dr. Ugarte concludes, \"The aortic valve is calcified, peak transaortic velocity 3.3 m/sec, valve area 1.0 cm2, mean gradient 32-33 mmHg, velocity index is 0.3. I suspect she will require valve intervention in the next year or so.\"    --Rechecked Echo.  It shows, mean gradient of 48 mmHg, with peak pressure 83.8, . calculated aortic valve area is 0.97 cm^2.\"In comparison to the previous report dated 10/20/2021, there has been an interval increase in transaortic gradients\"   -- Cardiology consult requested, they are recommending outpatient follow-up in the clinic for TAVR evaluation    Depression, anxiety  Sleep disturbance   -- Continue PTA Celexa, Remeron, Seroquel, trazodone and hydroxyzine      Hypertension  --Hold PTA losartan, resume PTA atenolol     Hyperlipidemia  -- Hold PTA pravachol given elevated CK      CKD Stage 2/3  Cr near 0.8-1 at baseline. Cr 0.90 on admit  -- Stable     Obesity: Estimated body mass index is 30.9 kg/m  as calculated from the following:  Height as of this encounter: 1.626 m (5' 4\").  Weight as of this encounter: 81.6 kg (180 lb)    Clinically Significant Risk Factors Present on Admission             # Obesity: Estimated body mass index is 30.9 kg/m  as calculated from the following:    Height as of this encounter: " "1.626 m (5' 4\").    Weight as of this encounter: 81.6 kg (180 lb).      Diet: Combination Diet Regular Diet Adult    Montelongo Catheter: Not present  DVT Prophylaxis: Pneumatic Compression Devices  Code Status: Full Code    The patient's care was discussed with the Patient and Patient's Family.    Disposition Plan   Expected Discharge: 03/15/2022     Anticipated discharge location:  Awaiting care coordination huddle   35 min were spent in direct patient care today including the floor time , more than 50% of the time was spent in patient care coordination and counseling.      Entered: Fouzia Arnold MD 03/14/2022, 7:40 AM     Fouzia Arnold MD, [facility]  Text Page (7am - 6pm)    ----------------------------------------------------------------------------------------------------------------------    Interval History   Patient care was assumed this morning, patient was seen and examined, daughter also present at the bedside.  Patient is slightly improved as compared to the last couple of days, daughter is a still concerned about patient not able to track completely but later during the conversation patient was able to keep good conversation with me, is remorseful regarding her alcohol use, would like to quit and would like to go to the inpatient residential treatment if possible.  Patient is otherwise denying any chest pain, palpitations or shortness of breath, we discussed about chemical dependency and psych evaluation.    -Data reviewed today: I reviewed all new labs and imaging results over the last 24 hours.    I personally reviewed no images or EKG's today.    Physical Exam   Temp: 97.4  F (36.3  C) Temp src: Oral BP: 110/64 Pulse: 73   Resp: 18 SpO2: 93 % O2 Device: None (Room air)    Vitals:    03/10/22 1445   Weight: 81.6 kg (180 lb)     Vital Signs with Ranges  Temp:  [97.4  F (36.3  C)-98.7  F (37.1  C)] 97.4  F (36.3  C)  Pulse:  [68-87] 73  Resp:  [16-19] 18  BP: (110-154)/(64-74) 110/64  SpO2:  [93 %-96 %] 93 %  I/O " last 3 completed shifts:  In: 360 [P.O.:360]  Out: -     GENERAL: Alert , awake and oriented. NAD. Conversational, appropriate.   HEENT: Normocephalic. EOMI. No icterus or injection. Nares normal.   LUNGS: Clear to auscultation. No dyspnea at rest.   HEART: Regular rate. Extremities perfused.   ABDOMEN: Soft, nontender, and nondistended. Positive bowel sounds.   EXTREMITIES: No LE edema noted.   NEUROLOGIC: Moves extremities x4 on command. No acute focal neurologic abnormalities noted.     Medications       aspirin  81 mg Oral Daily     atenolol  25 mg Oral Daily     cefTRIAXone  1 g Intravenous Q24H     citalopram  20 mg Oral Daily     fluticasone-vilanterol  1 puff Inhalation Daily     folic acid  1 mg Oral Daily     furosemide  20 mg Oral Daily     gabapentin  300 mg Oral TID     magnesium oxide  400 mg Oral TID     mirtazapine  15 mg Oral At Bedtime     multivitamin w/minerals  1 tablet Oral Daily     QUEtiapine  300 mg Oral At Bedtime     sodium chloride (PF)  3 mL Intracatheter Q8H     thiamine  100 mg Oral Daily       Data   Recent Labs   Lab 03/14/22  0640 03/13/22  0817 03/12/22  2135 03/12/22  1454 03/12/22  0801 03/11/22  0951 03/11/22  0645 03/10/22  2121 03/10/22  1454   WBC 6.9  --   --   --  7.5  --  7.1  --  13.6*   HGB 9.7*  --   --   --  8.8*  --  8.3*  --  11.1*   MCV 92  --   --   --  88  --  88  --  87     --   --   --  278  --  276  --   --      --   --   --  137  --  137  --  132*   POTASSIUM 4.3 4.2 4.3   < > 3.3*   < > 3.0*  --  3.5   CHLORIDE 109  --   --   --  106  --  104  --  93*   CO2  --   --   --   --  26  --  29  --  25   BUN  --   --   --   --  10  --  8  --  11   CR  --   --   --   --  0.88  --  0.89  --  0.90   ANIONGAP  --   --   --   --  5  --  4  --  14   BIRGIT  --   --   --   --  8.2*  --  7.7*  --  9.0   GLC  --   --   --   --  91  --  98 134* 146*   ALBUMIN  --   --   --   --   --   --   --   --  3.0*   PROTTOTAL  --   --   --   --   --   --   --   --  6.8    BILITOTAL  --   --   --   --   --   --   --   --  0.7   ALKPHOS  --   --   --   --   --   --   --   --  111   ALT  --   --   --   --   --   --   --   --  23   AST  --   --   --   --   --   --   --   --  41   LIPASE  --   --   --   --   --   --   --   --  202    < > = values in this interval not displayed.       Imaging:   No results found for this or any previous visit (from the past 24 hour(s)).

## 2022-03-15 ENCOUNTER — TELEPHONE (OUTPATIENT)
Dept: FAMILY MEDICINE | Facility: CLINIC | Age: 77
End: 2022-03-15
Payer: COMMERCIAL

## 2022-03-15 ENCOUNTER — APPOINTMENT (OUTPATIENT)
Dept: PHYSICAL THERAPY | Facility: CLINIC | Age: 77
DRG: 897 | End: 2022-03-15
Payer: COMMERCIAL

## 2022-03-15 LAB
ANION GAP SERPL CALCULATED.3IONS-SCNC: 3 MMOL/L (ref 3–14)
BUN SERPL-MCNC: 15 MG/DL (ref 7–30)
CALCIUM SERPL-MCNC: 8.8 MG/DL (ref 8.5–10.1)
CHLORIDE BLD-SCNC: 105 MMOL/L (ref 94–109)
CO2 SERPL-SCNC: 31 MMOL/L (ref 20–32)
CREAT SERPL-MCNC: 1.14 MG/DL (ref 0.52–1.04)
GFR SERPL CREATININE-BSD FRML MDRD: 49 ML/MIN/1.73M2
GLUCOSE BLD-MCNC: 89 MG/DL (ref 70–99)
MAGNESIUM SERPL-MCNC: 1.6 MG/DL (ref 1.6–2.3)
NT-PROBNP SERPL-MCNC: 959 PG/ML (ref 0–1800)
POTASSIUM BLD-SCNC: 3.9 MMOL/L (ref 3.4–5.3)
SODIUM SERPL-SCNC: 139 MMOL/L (ref 133–144)

## 2022-03-15 PROCEDURE — 83880 ASSAY OF NATRIURETIC PEPTIDE: CPT | Performed by: INTERNAL MEDICINE

## 2022-03-15 PROCEDURE — 82310 ASSAY OF CALCIUM: CPT | Performed by: INTERNAL MEDICINE

## 2022-03-15 PROCEDURE — 83735 ASSAY OF MAGNESIUM: CPT | Performed by: INTERNAL MEDICINE

## 2022-03-15 PROCEDURE — 250N000011 HC RX IP 250 OP 636: Performed by: INTERNAL MEDICINE

## 2022-03-15 PROCEDURE — 97116 GAIT TRAINING THERAPY: CPT | Mod: GP

## 2022-03-15 PROCEDURE — 36415 COLL VENOUS BLD VENIPUNCTURE: CPT | Performed by: INTERNAL MEDICINE

## 2022-03-15 PROCEDURE — 250N000013 HC RX MED GY IP 250 OP 250 PS 637: Performed by: INTERNAL MEDICINE

## 2022-03-15 PROCEDURE — 120N000001 HC R&B MED SURG/OB

## 2022-03-15 PROCEDURE — 99233 SBSQ HOSP IP/OBS HIGH 50: CPT | Performed by: INTERNAL MEDICINE

## 2022-03-15 PROCEDURE — 250N000013 HC RX MED GY IP 250 OP 250 PS 637: Performed by: STUDENT IN AN ORGANIZED HEALTH CARE EDUCATION/TRAINING PROGRAM

## 2022-03-15 RX ORDER — FUROSEMIDE 20 MG
20 TABLET ORAL
Status: DISCONTINUED | OUTPATIENT
Start: 2022-03-15 | End: 2022-03-15

## 2022-03-15 RX ORDER — CIPROFLOXACIN 500 MG/1
500 TABLET, FILM COATED ORAL EVERY 12 HOURS SCHEDULED
Status: DISCONTINUED | OUTPATIENT
Start: 2022-03-15 | End: 2022-03-15

## 2022-03-15 RX ORDER — FUROSEMIDE 20 MG
20 TABLET ORAL DAILY
Status: DISCONTINUED | OUTPATIENT
Start: 2022-03-16 | End: 2022-03-16 | Stop reason: HOSPADM

## 2022-03-15 RX ORDER — FUROSEMIDE 10 MG/ML
40 INJECTION INTRAMUSCULAR; INTRAVENOUS ONCE
Status: COMPLETED | OUTPATIENT
Start: 2022-03-15 | End: 2022-03-15

## 2022-03-15 RX ORDER — CIPROFLOXACIN 500 MG/1
500 TABLET, FILM COATED ORAL EVERY 12 HOURS SCHEDULED
Status: DISCONTINUED | OUTPATIENT
Start: 2022-03-15 | End: 2022-03-16 | Stop reason: HOSPADM

## 2022-03-15 RX ADMIN — FUROSEMIDE 20 MG: 20 TABLET ORAL at 09:40

## 2022-03-15 RX ADMIN — ATENOLOL 25 MG: 25 TABLET ORAL at 09:40

## 2022-03-15 RX ADMIN — Medication 400 MG: at 16:24

## 2022-03-15 RX ADMIN — Medication 400 MG: at 09:40

## 2022-03-15 RX ADMIN — FUROSEMIDE 40 MG: 10 INJECTION, SOLUTION INTRAVENOUS at 12:57

## 2022-03-15 RX ADMIN — GABAPENTIN 300 MG: 300 CAPSULE ORAL at 16:24

## 2022-03-15 RX ADMIN — FOLIC ACID 1 MG: 1 TABLET ORAL at 09:40

## 2022-03-15 RX ADMIN — MELATONIN 5 MG TABLET 5 MG: at 22:12

## 2022-03-15 RX ADMIN — MULTIPLE VITAMINS W/ MINERALS TAB 1 TABLET: TAB at 09:40

## 2022-03-15 RX ADMIN — FUROSEMIDE 20 MG: 20 TABLET ORAL at 16:24

## 2022-03-15 RX ADMIN — MIRTAZAPINE 15 MG: 15 TABLET, FILM COATED ORAL at 22:05

## 2022-03-15 RX ADMIN — GABAPENTIN 300 MG: 300 CAPSULE ORAL at 22:05

## 2022-03-15 RX ADMIN — CITALOPRAM HYDROBROMIDE 20 MG: 10 TABLET ORAL at 09:40

## 2022-03-15 RX ADMIN — QUETIAPINE FUMARATE 300 MG: 300 TABLET, EXTENDED RELEASE ORAL at 22:05

## 2022-03-15 RX ADMIN — THIAMINE HCL TAB 100 MG 100 MG: 100 TAB at 09:40

## 2022-03-15 RX ADMIN — FLUTICASONE FUROATE AND VILANTEROL TRIFENATATE 1 PUFF: 200; 25 POWDER RESPIRATORY (INHALATION) at 12:21

## 2022-03-15 RX ADMIN — CIPROFLOXACIN HYDROCHLORIDE 500 MG: 500 TABLET, FILM COATED ORAL at 22:05

## 2022-03-15 RX ADMIN — ASPIRIN 81 MG: 81 TABLET, COATED ORAL at 09:40

## 2022-03-15 RX ADMIN — HYDROXYZINE HYDROCHLORIDE 50 MG: 25 TABLET ORAL at 22:12

## 2022-03-15 RX ADMIN — CIPROFLOXACIN HYDROCHLORIDE 500 MG: 500 TABLET, FILM COATED ORAL at 12:22

## 2022-03-15 RX ADMIN — GABAPENTIN 300 MG: 300 CAPSULE ORAL at 09:40

## 2022-03-15 ASSESSMENT — ACTIVITIES OF DAILY LIVING (ADL)
ADLS_ACUITY_SCORE: 19
ADLS_ACUITY_SCORE: 21
ADLS_ACUITY_SCORE: 19

## 2022-03-15 NOTE — PROGRESS NOTES
Care Management Follow Up    Length of Stay (days): 5    Expected Discharge Date: 03/15/2022     Concerns to be Addressed:       Patient plan of care discussed at interdisciplinary rounds: Yes    Anticipated Discharge Disposition:       Anticipated Discharge Services:    Anticipated Discharge DME:      Patient/family educated on Medicare website which has current facility and service quality ratings:    Education Provided on the Discharge Plan:    Patient/Family in Agreement with the Plan: yes    Referrals Placed by CM/SW: Post Acute Facilities  Private pay costs discussed: Not applicable    Additional Information:    [employer] is asking for the following info:  Confirmation that pt can pay 184/day on day 21, onward + a discharge plan or for pt to apply for MA.  Sw discussed with pt/dtr.  [facility] and inquired into only pursuing TCU now with the goal of discharging into IP CD once indep; pt is agreeable.  Per dtr, she would first like to look into [facility] and [facility].  Sw stated that this will be looked into but if they don't have beds or don't accept, recommended referral be sent to [facility].  Sw went over Medicare.gov ratings with dtr/pt and both are agreeable to sending [facility] referral if other 2 facilities cannot accept.  Dtr states that if pt cannot immediately discharge into IP CD program following TCU, pt is able to discharge home with help/assistance/supervision from dtr or could discharge home with dtr, pending progress pt makes at TCU.      Aravind called [facility] and confirmed they do not have any beds this week.  Sw left  with admissions at [facility].  Aravind sent referral to [facility].  Aravind to continue to follow for discharge planning needs.        Elsa Chan, [facility]

## 2022-03-15 NOTE — PROGRESS NOTES
+2 edema noted in BLE. Currently on 20 mg Lasix. Provider notified. Provider has ordered a total of 60MG Lasix today. Care team will continue to monitor

## 2022-03-15 NOTE — PLAN OF CARE
Shift Summary [id number]    Admitting Diagnosis: Alcohol withdrawal syndrome without complication (H) [F10.230]  Non-traumatic rhabdomyolysis [M62.82]   Vitals : STABLE.   Pain 2/10. Taking  TYLENOL  PRN.   A&Ox4  Voiding : USES COMMODE.   Mobility : Ax1.   Tele : NSR  CMS : tingling in BLE.   Lung Sounds  CLEAR POSTERIOR LOBES BUT OCCASIONAL AUDIBLE WHEEZING PRESENT.       GI : uses commode.   Dressing ; PIV site intact. Saline locked.     Orders Placed This Encounter      Combination Diet Regular Diet Adult       Plan: CIWA score during day shift is 1. No significant evidence of alcohol withdrawal. Discharge is pending.

## 2022-03-15 NOTE — PLAN OF CARE
Stand by assist, alert and oriented with appropriate use of the call light.  CIWA scoring 0.  Lung sounds clear, encouraged respiratory exercises.  Bowel sounds present, passing flatus, tolerating diet.  Voiding with adequate urine output.  No complaints of pain.  Continue to monitor.

## 2022-03-15 NOTE — TELEPHONE ENCOUNTER
Reason for Call:  Form, our goal is to have forms completed with 72 hours, however, some forms may require a visit or additional information.    Type of letter, form or note:  RX Change    Who is the form from?: [employer] (if other please explain)    Where did the form come from: form was faxed in    What clinic location was the form placed at?: [facility]    Where the form was placed: [employer] Box/Folder    What number is listed as a contact on the form?: [phone]           Call taken on 3/15/2022 at 4:36 PM by Yuliana Rayo

## 2022-03-15 NOTE — PROGRESS NOTES
[facility]    HOSPITALIST PROGRESS NOTE :   --------------------------------------------------    Date of Admission:  3/10/2022    Cumulative Summary: Kavitha Headley is a 76 year old female with past medical history significant for alcohol use disorder, recent hospitalization with upper respiratory infection and bronchitis, chronic anemia secondary to alcoholism, diastolic congestive heart failure, pulmonary hypertension and severe aortic stenosis with history of depression and anxiety who was admitted on March 10 when she presented after a mechanical fall and was also in alcohol withdrawal.    Assessment & Plan     Mechanical Fall   Elevated CK, not clinically significant  The patient reports that two days ago she got up to use the bathroom, slipped and fell. She was apparently unable to get up on her own but was able to crawl into the other room to get her phone to call EMS. She does have scattered bruising/scrapes on her knees and elbows. Her CK is mildly elevated to 460. No other apparent injuries     --Patient care was assumed this morning, looks clinically better , thinks that she is almost back to baseline cognitively but is still weak and is open to the idea of going to rehab first to get her strength back  --Patient is now off fluids, CKs in normal range  --Patient has been evaluated by therapies, patient will benefit from getting discharged to TCU, patient is also interested in inpatient chemical dependency program, see below     Alcohol use disorder   Alcohol withdrawal   Pt tachycardic and tremulous on arrival to the ED. She usually has about 1 pint of demetrice daily. Her last drink was reportedly two days ago prior to her fall. She continues to be tremulous.    --Long discussion with patient and her daughter on 03/14, patient thinks that she has hit the rock bottom, would like to stop drinking, aware about previous failures due to craving and also associated mental illness with  depression and anxiety.  --Chemical dependency and psych has been consulted  -- are consulted, they are already looking into inpatient chemical dependency program, hoping that patient can be discharged to one of the facilities after the hospitalization, otherwise patient might need to do inpatient rehab and then go to inpatient treatment when bed is available.  --Patient has only been discovering 1 when CIWA scoring is done at bedside, as needed Ativan is available.  --Continue Thiamine/folate multivitamin     Dyspnea   ?Hx of asthma/COPD  Pt notes some shortness of breath. O2 sats are stable on room air, but she is clearly dyspneic on examination. She is on Dulera, albuterol and duonebs PTA but this is not clearly documented int he medical record.     --CXR showed only low lung volumes (normal heart size, no infiltrates)  --BNP is normal, nothing for CHF on CXR.concern for increase B/L lower extremity swelling today , will recheck BNP and BMP , will give additional dose of IV Lasix   --Daughter stresses that patient has never smoked and never had a diagnosis of asthma or COPD  --Age adjusted d-dimer (0.1 x age) is normal, does not need further evaluation for PE --Continue PTA dulera, albuterol, duonebs PRN   --See further discussion below, re: concern for severe aortic stenosis contributing to GRULLON, long discussion with patient and her daughter at this time I feel that her symptoms are most likely secondary to combination of her diastolic heart failure, pulmonary hypertension with underlying valvular disease, patient does not have any long history of lung disease and she was a started on inhalers when she was admitted to the hospital in December 2021 when she was evaluated by pulmonary.     Hyponatremia: Na = 132 mmol/L (Ref range: 133 - 144 mmol/L) on admission, will monitor as appropriate     Hypomagnesemia   --Mag replacement per protocol      Mild leukocytosis   WBC 13.6. no clear signs/symptoms  "of infection     -- Monitor WBC  --UA shows many bacteria; urine culture shows >100,000 CFU/mL Escherichia coli   --Added Ceftriaxone, follow up sensitivities , E. coli is pansensitive, plan to switch to oral antibiotics on discharge.     Normocytic Anemia   hgb is 11.1 on admission which is actually higher than her baseline of around 8-10. Likely some component of hemoconcentration in the setting of dehydration  -- Monitor hemoglobin     Severe aortic stenosis  Diastolic congestive heart failure   Pulmonary HTN    TTE from 2021 showed EF of 60-65% with elevated left ventricular filling pressures. Moderate to severe valvular aortic stenosis. TTE from 12/2021 could not assess aortic valve gradients, but moderate Pulmonary HTN was present.  --She describes dyspnea on exertion, she thinks symptoms are progressive since a hospital stay in December  --She last saw Dr. Ugarte in clinic on 11/24/2021.  Dr. Ugarte concludes, \"The aortic valve is calcified, peak transaortic velocity 3.3 m/sec, valve area 1.0 cm2, mean gradient 32-33 mmHg, velocity index is 0.3. I suspect she will require valve intervention in the next year or so.\"    --Rechecked Echo.  It shows, mean gradient of 48 mmHg, with peak pressure 83.8, . calculated aortic valve area is 0.97 cm^2.\"In comparison to the previous report dated 10/20/2021, there has been an interval increase in transaortic gradients\"   -- Cardiology consult requested, they are recommending outpatient follow-up in the clinic for TAVR evaluation    Depression, anxiety  Sleep disturbance   -- Continue PTA Celexa, Remeron, Seroquel, trazodone and hydroxyzine      Hypertension  --Hold PTA losartan, resume PTA atenolol     Hyperlipidemia  -- Hold PTA pravachol given elevated CK      CKD Stage 2/3  Cr near 0.8-1 at baseline. Cr 0.90 on admit  -- Stable     Obesity: Estimated body mass index is 30.9 kg/m  as calculated from the following:  Height as of this encounter: 1.626 m (5' 4\").  Weight as of " this encounter: 81.6 kg (180 lb)    Clinically Significant Risk Factors Present on Admission                   Diet: Combination Diet Regular Diet Adult    Montelongo Catheter: Not present  DVT Prophylaxis: Pneumatic Compression Devices  Code Status: Full Code    The patient's care was discussed with the Patient and Patient's Family.    Disposition Plan   Expected Discharge: 03/15/2022     Anticipated discharge location:  Awaiting care coordination huddle    35 min were spent in direct patient care today including the floor time , more than 50% of the time was spent in patient care coordination and counseling.    Entered: Fouzia Arnold MD 03/15/2022, 10:03 AM     Fouzia Arnold MD, FACP  Text Page (7am - 6pm)    ----------------------------------------------------------------------------------------------------------------------    Interval History    Patient was seen and examined, sitting in chair , eating lunch, denying any complaints , open to the idea of going to rehab first for debilitation and then going to inpatient treatment for chemical depoendency    -Data reviewed today: I reviewed all new labs and imaging results over the last 24 hours.    I personally reviewed no images or EKG's today.    Physical Exam   Temp: 98.3  F (36.8  C) Temp src: Oral BP: 133/74 Pulse: 76   Resp: 18 SpO2: 94 % O2 Device: None (Room air)    Vitals:    03/10/22 1445   Weight: 81.6 kg (180 lb)     Vital Signs with Ranges  Temp:  [97.7  F (36.5  C)-98.8  F (37.1  C)] 98.3  F (36.8  C)  Pulse:  [64-79] 76  Resp:  [18] 18  BP: (112-161)/(64-86) 133/74  SpO2:  [94 %-95 %] 94 %  I/O last 3 completed shifts:  In: 240 [P.O.:240]  Out: -     GENERAL: Alert , awake and oriented. NAD. Conversational, appropriate.   HEENT: Normocephalic. EOMI. No icterus or injection. Nares normal.   LUNGS: Clear to auscultation. No dyspnea at rest.   HEART: Regular rate. Extremities perfused.   ABDOMEN: Soft, nontender, and nondistended. Positive bowel sounds.    EXTREMITIES: No LE edema noted.   NEUROLOGIC: Moves extremities x4 on command. No acute focal neurologic abnormalities noted.     Medications       aspirin  81 mg Oral Daily     atenolol  25 mg Oral Daily     ciprofloxacin  500 mg Oral Q12H AMOR     citalopram  20 mg Oral Daily     fluticasone-vilanterol  1 puff Inhalation Daily     folic acid  1 mg Oral Daily     furosemide  20 mg Oral Daily     gabapentin  300 mg Oral TID     magnesium oxide  400 mg Oral TID     mirtazapine  15 mg Oral At Bedtime     multivitamin w/minerals  1 tablet Oral Daily     QUEtiapine  300 mg Oral At Bedtime     sodium chloride (PF)  3 mL Intracatheter Q8H       Data   Recent Labs   Lab 03/14/22  0640 03/13/22  0817 03/12/22  2135 03/12/22  1454 03/12/22  0801 03/11/22  0951 03/11/22  0645 03/10/22  2121 03/10/22  1454   WBC 6.9  --   --   --  7.5  --  7.1  --  13.6*   HGB 9.7*  --   --   --  8.8*  --  8.3*  --  11.1*   MCV 92  --   --   --  88  --  88  --  87     --   --   --  278  --  276  --   --      --   --   --  137  --  137  --  132*   POTASSIUM 4.3 4.2 4.3   < > 3.3*   < > 3.0*  --  3.5   CHLORIDE 109  --   --   --  106  --  104  --  93*   CO2 27  --   --   --  26  --  29  --  25   BUN 12  --   --   --  10  --  8  --  11   CR 0.99  --   --   --  0.88  --  0.89  --  0.90   ANIONGAP 4  --   --   --  5  --  4  --  14   BIRGIT 8.6  --   --   --  8.2*  --  7.7*  --  9.0   GLC 94  --   --   --  91  --  98   < > 146*   ALBUMIN  --   --   --   --   --   --   --   --  3.0*   PROTTOTAL  --   --   --   --   --   --   --   --  6.8   BILITOTAL  --   --   --   --   --   --   --   --  0.7   ALKPHOS  --   --   --   --   --   --   --   --  111   ALT  --   --   --   --   --   --   --   --  23   AST  --   --   --   --   --   --   --   --  41   LIPASE  --   --   --   --   --   --   --   --  202    < > = values in this interval not displayed.       Imaging:   No results found for this or any previous visit (from the past 24 hour(s)).

## 2022-03-16 ENCOUNTER — APPOINTMENT (OUTPATIENT)
Dept: PHYSICAL THERAPY | Facility: CLINIC | Age: 77
DRG: 897 | End: 2022-03-16
Payer: COMMERCIAL

## 2022-03-16 ENCOUNTER — LAB REQUISITION (OUTPATIENT)
Dept: LAB | Facility: CLINIC | Age: 77
End: 2022-03-16
Payer: COMMERCIAL

## 2022-03-16 ENCOUNTER — TELEPHONE (OUTPATIENT)
Dept: ADDICTION MEDICINE | Facility: CLINIC | Age: 77
End: 2022-03-16
Payer: COMMERCIAL

## 2022-03-16 VITALS
DIASTOLIC BLOOD PRESSURE: 48 MMHG | SYSTOLIC BLOOD PRESSURE: 100 MMHG | TEMPERATURE: 98.4 F | HEART RATE: 61 BPM | WEIGHT: 180 LBS | RESPIRATION RATE: 16 BRPM | BODY MASS INDEX: 30.73 KG/M2 | HEIGHT: 64 IN | OXYGEN SATURATION: 95 %

## 2022-03-16 DIAGNOSIS — Z11.1 ENCOUNTER FOR SCREENING FOR RESPIRATORY TUBERCULOSIS: ICD-10-CM

## 2022-03-16 PROCEDURE — 97530 THERAPEUTIC ACTIVITIES: CPT | Mod: GP | Performed by: PHYSICAL THERAPY ASSISTANT

## 2022-03-16 PROCEDURE — 97110 THERAPEUTIC EXERCISES: CPT | Mod: GP | Performed by: PHYSICAL THERAPY ASSISTANT

## 2022-03-16 PROCEDURE — 97116 GAIT TRAINING THERAPY: CPT | Mod: GP | Performed by: PHYSICAL THERAPY ASSISTANT

## 2022-03-16 PROCEDURE — 99239 HOSP IP/OBS DSCHRG MGMT >30: CPT | Performed by: INTERNAL MEDICINE

## 2022-03-16 PROCEDURE — 250N000013 HC RX MED GY IP 250 OP 250 PS 637: Performed by: STUDENT IN AN ORGANIZED HEALTH CARE EDUCATION/TRAINING PROGRAM

## 2022-03-16 PROCEDURE — 250N000013 HC RX MED GY IP 250 OP 250 PS 637: Performed by: INTERNAL MEDICINE

## 2022-03-16 RX ORDER — CIPROFLOXACIN 500 MG/1
500 TABLET, FILM COATED ORAL EVERY 12 HOURS
DISCHARGE
Start: 2022-03-16 | End: 2022-03-21

## 2022-03-16 RX ADMIN — GABAPENTIN 300 MG: 300 CAPSULE ORAL at 08:54

## 2022-03-16 RX ADMIN — LORAZEPAM 1 MG: 1 TABLET ORAL at 01:35

## 2022-03-16 RX ADMIN — FUROSEMIDE 20 MG: 20 TABLET ORAL at 08:55

## 2022-03-16 RX ADMIN — FOLIC ACID 1 MG: 1 TABLET ORAL at 08:55

## 2022-03-16 RX ADMIN — CITALOPRAM HYDROBROMIDE 20 MG: 10 TABLET ORAL at 08:55

## 2022-03-16 RX ADMIN — FLUTICASONE FUROATE AND VILANTEROL TRIFENATATE 1 PUFF: 200; 25 POWDER RESPIRATORY (INHALATION) at 11:04

## 2022-03-16 RX ADMIN — ATENOLOL 25 MG: 25 TABLET ORAL at 08:55

## 2022-03-16 RX ADMIN — CIPROFLOXACIN HYDROCHLORIDE 500 MG: 500 TABLET, FILM COATED ORAL at 08:54

## 2022-03-16 RX ADMIN — MULTIPLE VITAMINS W/ MINERALS TAB 1 TABLET: TAB at 11:04

## 2022-03-16 RX ADMIN — ASPIRIN 81 MG: 81 TABLET, COATED ORAL at 08:55

## 2022-03-16 ASSESSMENT — ACTIVITIES OF DAILY LIVING (ADL)
ADLS_ACUITY_SCORE: 19
ADLS_ACUITY_SCORE: 15
ADLS_ACUITY_SCORE: 19
ADLS_ACUITY_SCORE: 13
ADLS_ACUITY_SCORE: 19
ADLS_ACUITY_SCORE: 13
ADLS_ACUITY_SCORE: 15
ADLS_ACUITY_SCORE: 19
ADLS_ACUITY_SCORE: 19
ADLS_ACUITY_SCORE: 15
ADLS_ACUITY_SCORE: 19
ADLS_ACUITY_SCORE: 15

## 2022-03-16 NOTE — PROGRESS NOTES
Patient is calm during discharge. No signs or symptoms of ETOH withdrawal. A&O X3. Edema in BLE. Bladder incontinence r/t limited mobility and diuretic therapy.Foam dressing applied to skin tear on the left forearm. Patient will discharge at 3:00pm to a TCU.

## 2022-03-16 NOTE — PROGRESS NOTES
Alert and oriented X4. CIWA protocol for ETOH. Ax1 transfer gait belt. NSR with occassional PVC. Tolerated Lasix for BLE edema. And audible wheezing. Care team will continue to monitor.

## 2022-03-16 NOTE — PROGRESS NOTES
Care Management Discharge Note    Discharge Date: 03/16/2022       Discharge Disposition:      Discharge Services:      Discharge DME:      Discharge Transportation: car, drives self    Private pay costs discussed: Not applicable    PAS Confirmation Code:  [id number]  Patient/family educated on Medicare website which has current facility and service quality ratings:      Education Provided on the Discharge Plan: yes   Persons Notified of Discharge Plans: patient  Patient/Family in Agreement with the Plan: yes    Handoff Referral Completed: Yes    Additional Information:  Bed available at [facility]. Patient's dtr is bringing her. Completed PAS and put in chart.     PAS-RR    D: Per DHS regulation, SW completed and submitted PAS-RR to [place] [employer]ging Direct Connect via the Senior LinkAge Line.  PAS-RR confirmation # is : [id number]    I: SW spoke with patient and they are aware a PAS-RR has been submitted.  SW reviewed with patient that they may be contacted for a follow up appointment within 10 days of hospital discharge if their SNF stay is < 30 days.  Contact information for Senior LinkAge Line was also provided.    A: Patient verbalized understanding.    P: Further questions may be directed to [facility] LinkAge Line at #[phone], option #4 for PAS-RR staff.    NICOLE Torres

## 2022-03-16 NOTE — PROGRESS NOTES
Care Management Follow Up    Length of Stay (days): 6    Expected Discharge Date: 03/16/2022     Concerns to be Addressed:       Patient plan of care discussed at interdisciplinary rounds: Yes    Anticipated Discharge Disposition:    Anticipated Discharge Services:    Anticipated Discharge DME:      Patient/family educated on Medicare website which has current facility and service quality ratings:  Yes  Education Provided on the Discharge Plan:    Patient/Family in Agreement with the Plan: yes    Referrals Placed by CM/SW: Post Acute Facilities  Private pay costs discussed: Not applicable    Additional Information:  Patient was accepted at [facility] TCU for 3/16. SW met with patient and daughter and discussed CD after TCU. RACHAEL provided [facility] referral recommendations to [facility]. SW provided patient with shoes and a jacket from [facility]. Patient plans to discharge with daughter to TCU at 1500 today.    Dary Tellez, MSW, LGSW

## 2022-03-16 NOTE — DISCHARGE SUMMARY
[facility]  Hospitalist Discharge Summary      Date of Admission:  3/10/2022  Date of Discharge:  3/16/2022  Discharging Provider: Fouzia Arnold MD, FACP  Discharge Service: Hospitalist Service    Discharge Diagnoses   S/p mechanical fall.  Elevated CK, not clinically significant, improved.  Ongoing alcohol use disorder  E.Coli UTI  Alcohol withdrawal symptoms, improved.  Dyspnea from recent episode of bronchitis, also have underlying pulmonary hypertension and congestive heart failure, stable.  Severe aortic stenosis, stable.  Diastolic congestive heart failure, stable and compensated.  Pulmonary hypertension.  Hyponatremia, improved.  Hypomagnesemia, improved.  Mild leukocytosis, improved.  Normocytic anemia.  Depression and anxiety.  Sleep disturbance.  Essential hypertension.  Hyperlipidemia.  CKD stage II.  Obesity    Follow-ups Needed After Discharge   Follow-up Appointments     Follow Up and recommended labs and tests      Follow up with [facility] physician.  The following labs/tests are   recommended: BMP and CBC.             Unresulted Labs Ordered in the Past 30 Days of this Admission     No orders found from 2/8/2022 to 3/11/2022.        Discharge Disposition   Discharged to short-term care facility  Condition at discharge: Stable    Hospital Course   Cumulative Summary: Kavitha Headley is a 76 year old female with past medical history significant for alcohol use disorder, recent hospitalization with upper respiratory infection and bronchitis, chronic anemia secondary to alcoholism, diastolic congestive heart failure, pulmonary hypertension and severe aortic stenosis with history of depression and anxiety who was admitted on March 10 when she presented after a mechanical fall and was also in alcohol withdrawal.  Here are further details regarding her current hoapitalization     Mechanical Fall   Elevated CK, not clinically significant  The patient reports that two days ago she  got up to use the bathroom, slipped and fell. She was apparently unable to get up on her own but was able to crawl into the other room to get her phone to call EMS. She does have scattered bruising/scrapes on her knees and elbows. Her CK is mildly elevated to 460. No other apparent injuries      -- Patient looks clinically better , thinks that she is back to baseline cognitively but is still weak and is open to the idea of going to rehab first to get her strength back  --Patient is now off fluids, CKs in normal range  --Patient has been evaluated by therapies, patient will benefit from getting discharged to TCU, patient is also interested in inpatient chemical dependency program, see below     Alcohol use disorder   Alcohol withdrawal   Pt tachycardic and tremulous on arrival to the ED. She usually has about 1 pint of demetrice daily. Her last drink was reportedly two days ago prior to her fall. She continues to be tremulous.     --Long discussion with patient and her daughter on 03/14, patient thinks that she has hit the rock bottom, would like to stop drinking, aware about previous failures due to craving and also associated mental illness with depression and anxiety.  --Chemical dependency and psych has been consulted  -- are consulted, they are already looking into inpatient chemical dependency program  --At this time, plan is for patient to go to short-term rehab, in the meantime referral for inpatient chemical dependency treatment programs are sent, patient is planned to continue once bed is available.  --Continue Thiamine/folate multivitamin   --Patient has not required any medications for alcohol withdrawal for more than 48 hours now.     Dyspnea : Although asthma and COPD was mentioned in his previous note patient does not have any known history of asthma, she does have a secondhand smoking exposure for few years in the past but does not have personal history of COPD either.  Patient did have  recent hospitalization with upper respiratory tract infections when she was diagnosed with bronchitis and was also evaluated by pulmonary and has been started on inhalers.  At this time her dyspnea also seems to be multifactorial from underlying pulmonary hypertension , severe aortic stenosis and chronic congestive heart failure with preserved ejection fraction.  She is on Dulera, albuterol and duonebs PTA but this is not clearly documented int he medical record.      --CXR showed only low lung volumes (normal heart size, no infiltrates)  --BNP is normal, nothing for CHF on CXR.patient did receive an additional dose of Lasix yesterday due to the concern for increased bilateral lower extremity swelling, her BNP came back in normal range, at this time patient will be continuing her Lasix 20 mg p.o. daily which is her PTA dose after the discharge.  --I also had long discussion with patient and her daughter during this hospitalization about the diagnosis of COPD and asthma at this time patient does not have any history of above diagnosis and also she does not have any symptoms patient did have secondhand exposure to smoking earlier in 70s.  --Continue her PTA Dulera, albuterol and DuoNebs as needed which were prescribed to her by pulmonary when she was admitted to the hospital in December for intermittent bronchospasm.  --Patient will benefit from outpatient pulmonary follow-up and having outpatient pulmonary function testing.     Hyponatremia: Na = 132 mmol/L (Ref range: 133 - 144 mmol/L) on admission  --Sodium is now improved to 139.    Hypomagnesemia   --Mag replacement per protocol      Mild leukocytosis   E.Coli UTI  -- Monitor WBC  -- UA shows many bacteria; urine culture shows >100,000 CFU/mL Escherichia coli   --Patient will be finishing the course of antibiotic with ciprofloxacin for next 4 days.     Normocytic Anemia   hgb is 11.1 on admission which is actually higher than her baseline of around 8-10. Likely  "some component of hemoconcentration in the setting of dehydration  --Hemoglobin is a stable can be monitored intermittently at the rehab.     Severe aortic stenosis  Diastolic congestive heart failure   Pulmonary HTN    TTE from 2021 showed EF of 60-65% with elevated left ventricular filling pressures. Moderate to severe valvular aortic stenosis. TTE from 12/2021 could not assess aortic valve gradients, but moderate Pulmonary HTN was present.  --She describes dyspnea on exertion, she thinks symptoms are progressive since a hospital stay in December  --She last saw Dr. Ugarte in clinic on 11/24/2021.  Dr. Ugarte concludes, \"The aortic valve is calcified, peak transaortic velocity 3.3 m/sec, valve area 1.0 cm2, mean gradient 32-33 mmHg, velocity index is 0.3. I suspect she will require valve intervention in the next year or so.\"    --Rechecked Echo.  It shows, mean gradient of 48 mmHg, with peak pressure 83.8, . calculated aortic valve area is 0.97 cm^2.\"In comparison to the previous report dated 10/20/2021, there has been an interval increase in transaortic gradients\"   -- Cardiology consult requested, they are recommending outpatient follow-up in the clinic for TAVR evaluation     Depression, anxiety  Sleep disturbance   -- Continue PTA Celexa, Remeron, Seroquel, trazodone and hydroxyzine      Hypertension  --Hold PTA losartan on discharge as patient did not require it during the hospitalization due to low normal blood pressure.  --Continue PTA beta-blocker on discharge.     Hyperlipidemia  --Pravachol can be restarted on discharge as patient is recovered and her CK is now in normal range for many days.      CKD Stage 2/3  Cr near 0.8-1 at baseline. Cr 0.90 on admit  -- Stable     Obesity: Estimated body mass index is 30.9 kg/m  as calculated from the following:  Height as of this encounter: 1.626 m (5' 4\").  Weight as of this encounter: 81.6 kg (180 lb)    Patient was seen and examined on the day of discharge ,she is " feeling well, does not have any complaints , I did review the discharge medications and instructions with the patient and plan for her to follow up with the PCP after the hospitalization .patient was in agreement , she is discharged in stable condition to TCU.    Consultations This Hospital Stay   CARE MANAGEMENT / SOCIAL WORK IP CONSULT  PHYSICAL THERAPY ADULT IP CONSULT  CHEMICAL DEPENDENCY IP CONSULT  PSYCHIATRY IP CONSULT  CARDIOLOGY IP CONSULT  PHYSICAL THERAPY ADULT IP CONSULT  OCCUPATIONAL THERAPY ADULT IP CONSULT    Code Status   Full Code    Time Spent on this Encounter   I, Fouzia Arnold MD, personally saw the patient today and spent greater than 30 minutes discharging this patient.       Fouzia Arnold MD, FACP  [facility]  [address] [address]  Phone: [phone]  Fax: [phone]  ______________________________________________________________________    Physical Exam   Vital Signs: Temp: 98.4  F (36.9  C) Temp src: Oral BP: 100/48 Pulse: 61   Resp: 16 SpO2: 95 % O2 Device: None (Room air)    Weight: 180 lbs 0 oz    Physical Exam  Vitals and nursing note reviewed.   Constitutional:       Appearance: She is well-developed.   HENT:      Head: Normocephalic and atraumatic.   Eyes:      Conjunctiva/sclera: Conjunctivae normal.      Pupils: Pupils are equal, round, and reactive to light.   Neck:      Thyroid: No thyromegaly.   Cardiovascular:      Rate and Rhythm: Normal rate and regular rhythm.      Heart sounds: Normal heart sounds. No murmur heard.  Pulmonary:      Effort: Pulmonary effort is normal. No respiratory distress.      Breath sounds: Normal breath sounds. No wheezing.   Abdominal:      General: Bowel sounds are normal.      Palpations: Abdomen is soft.      Tenderness: There is no abdominal tenderness. There is no guarding or rebound.   Musculoskeletal:         General: No deformity. Normal range of motion.      Cervical back: Normal range  of motion and neck supple.   Skin:     General: Skin is warm and dry.   Neurological:      Mental Status: She is alert and oriented to person, place, and time.   Psychiatric:         Behavior: Behavior normal.          Primary Care Physician   Nayeli Castorena    Discharge Orders      General info for SNF    Length of Stay Estimate: Short Term Care: Estimated # of Days <30  Condition at Discharge: Improving  Level of care:skilled   Rehabilitation Potential: Good  Admission H&P remains valid and up-to-date: Yes  Recent Chemotherapy: N/A  Use Nursing Home Standing Orders: Yes     Mantoux instructions    Give two-step Mantoux (PPD) Per Facility Policy Yes     Follow Up and recommended labs and tests    Follow up with [facility] physician.  The following labs/tests are recommended: BMP and CBC.     Activity - Up with nursing assistance     Reason for your hospital stay    Patient was     Additional Discharge Instructions    During the hospitalization, you did not require losartan which is currently stopped but if your blood pressure starts to go high in the rehab your physician at the rehab my to start you back on losartan 25 mg p.o. daily.  Your labs are is stable you are recommended to go to residential chemical dependency treatment once you are done with the rehab or whenever the chemical dependency bed is available.     Full Code     Physical Therapy Adult Consult    Evaluate and treat as clinically indicated.    Reason: Acute illness debilitation     Occupational Therapy Adult Consult    Evaluate and treat as clinically indicated.    Reason: Acute illness debilitation     Fall precautions     Advance Diet as Tolerated    Follow this diet upon discharge: Orders Placed This Encounter      Combination Diet Regular Diet Adult         Significant Results and Procedures   Results for orders placed or performed during the hospital encounter of 03/10/22   XR Chest Port 1 View    Narrative    EXAM: XR CHEST PORT 1  VIEW  LOCATION: [facility]  DATE/TIME: 3/11/2022 5:30 AM    INDICATION: dyspnea  COMPARISON: 2021      Impression    IMPRESSION: Lung volumes are low with bibasilar atelectasis. Heart size is likely stable. No overt failure, large pleural effusions or pneumothorax. No acute bony abnormality.   Echo Limited     Value    LVEF  60-65%    [facility]    [id number]  [id number]  [id number]  [id number]^CORRINE^[place]^NICOLE     [facility]  Echocardiography Laboratory  [address] [place] [address]     Name: DARWIN JASSO  MRN: [id number]  : 1945  Study Date: 2022 10:46 AM  Age: 76 yrs  Gender: Female  Patient Location: [facility]  Reason For Study: Aortic Valve Disorder  Ordering Physician: CANDIDO CASTLE  Referring Physician: CANDIDO CASTLE  Performed By: NII Mcdowell     BSA: 1.9 m2  Height: 64 in  Weight: 180 lb  HR: 73  BP: 124/79 mmHg  ______________________________________________________________________________  Procedure  Limited Portable Echo Adult. Optison (NDC #1783-2187) given intravenously.  ______________________________________________________________________________  Interpretation Summary     1. The left ventricle is normal in size. Left ventricular systolic function is  normal. The visual ejection fraction is 60-65%. No regional wall motion  abnormalities noted. There is no thrombus seen in the left ventricle.  2. The right ventricle is normal size. The right ventricular systolic function  is normal.  3. The left atrium is moderate to severely dilated  4. Severe valvular aortic stenosis. The mean AoV pressure gradient is 48.0  mmHg. The peak AoV pressure gradient is 83.8 mmHg. The calculated aortic valve  are is 0.97 cm^2.  5. No pericardial effusion.  6. In comparison to the previous report dated 10/20/2021, there has been an  interval increase in transaortic  gradients.  ______________________________________________________________________________  Left Ventricle  The left ventricle is normal in size. Left ventricular systolic function is  normal. The visual ejection fraction is 60-65%. No regional wall motion  abnormalities noted. There is no thrombus seen in the left ventricle.     Right Ventricle  The right ventricle is normal size. The right ventricular systolic function is  normal.     Atria  The left atrium is moderate to severely dilated. Right atrium not well  visualized.     Mitral Valve  The mitral valve leaflets are mildly thickened. There is mild mitral annular  calcification. There is trace mitral regurgitation.     Tricuspid Valve  There is mild (1+) tricuspid regurgitation. The right ventricular systolic  pressure is approximated at 56.5 mmHg plus the right atrial pressure. Right  ventricular systolic pressure is elevated, consistent with moderate to severe  pulmonary hypertension.     Aortic Valve  There is mild (1+) aortic regurgitation. Severe valvular aortic stenosis. The  mean AoV pressure gradient is 48.0 mmHg. The peak AoV pressure gradient is  83.8 mmHg. The calculated aortic valve are is 0.97 cm^2.     Pulmonic Valve  The pulmonic valve is not well visualized.     Vessels  The aortic root is normal size. The ascending aorta is Mildly dilated.  Inferior vena cava not well visualized for estimation of right atrial  pressure.     Pericardium  There is no pericardial effusion.     Rhythm  Sinus rhythm was noted.  ______________________________________________________________________________  MMode/2D Measurements & Calculations  IVSd: 1.2 cm     LVIDd: 4.0 cm  LVIDs: 2.1 cm  LVPWd: 0.95 cm  FS: 46.8 %  LV mass(C)d: 139.3 grams  LV mass(C)dI: 74.5 grams/m2  Ao root diam: 3.1 cm  asc Aorta Diam: 3.9 cm  LVOT diam: 2.0 cm  LVOT area: 3.2 cm2  RWT: 0.47     Doppler Measurements & Calculations  MV max P.7 mmHg  MV mean P.2 mmHg  MV V2 VTI: 55.7  cm  MVA(VTI): 1.9 cm2  Ao V2 max: 457.8 cm/sec  Ao max P.8 mmHg  Ao V2 mean: 327.1 cm/sec  Ao mean P.0 mmHg  Ao V2 VTI: 107.1 cm  RENE(I,D): 0.97 cm2  RENE(V,D): 0.95 cm2  LV V1 max P.3 mmHg  LV V1 max: 134.8 cm/sec  LV V1 VTI: 32.3 cm  SV(LVOT): 103.7 ml  SI(LVOT): 55.4 ml/m2  TR max enrique: 375.9 cm/sec  TR max P.5 mmHg  AV Enrique Ratio (DI): 0.29  RENE Index (cm2/m2): 0.52     ______________________________________________________________________________  Report approved by: Felipe Oakley 2022 12:16 PM           *Note: Due to a large number of results and/or encounters for the requested time period, some results have not been displayed. A complete set of results can be found in Results Review.       Discharge Medications   Current Discharge Medication List      START taking these medications    Details   ciprofloxacin (CIPRO) 500 MG tablet Take 1 tablet (500 mg) by mouth every 12 hours for 5 days    Associated Diagnoses: Alcohol withdrawal syndrome without complication (H); Non-traumatic rhabdomyolysis; Benign essential hypertension; Acute cystitis without hematuria         CONTINUE these medications which have NOT CHANGED    Details   acamprosate (CAMPRAL) 333 MG EC tablet Take 2 tablets (666 mg) by mouth 3 times daily  Qty: 180 tablet, Refills: 1    Associated Diagnoses: Alcohol use disorder, severe, in early remission (H)      acetaminophen (TYLENOL) 500 MG tablet Take 1,000 mg by mouth 2 times daily as needed for pain      albuterol (PROAIR HFA/PROVENTIL HFA/VENTOLIN HFA) 108 (90 Base) MCG/ACT inhaler Inhale 2 puffs into the lungs every 6 hours as needed for wheezing  Qty: 18 g, Refills: 0    Comments: Pharmacy may dispense brand covered by insurance (Proair, or proventil or ventolin or generic albuterol inhaler)      aspirin 81 MG EC tablet Take 81 mg by mouth daily      atenolol (TENORMIN) 25 MG tablet Take 1 tablet (25 mg) by mouth daily  Qty: 100 tablet, Refills: 3      citalopram  (CELEXA) 20 MG tablet Take 1 tablet (20 mg) by mouth daily  Qty: 90 tablet, Refills: 1    Associated Diagnoses: Anxiety      diclofenac (VOLTAREN) 50 MG EC tablet Take 1 tablet (50 mg) by mouth 2 times daily as needed for moderate pain  Qty: 90 tablet, Refills: 1    Associated Diagnoses: Chronic pain syndrome      folic acid (FOLVITE) 1 MG tablet Take 1 mg by mouth daily      furosemide (LASIX) 20 MG tablet TAKE 1 TABLET DAILY  Qty: 100 tablet, Refills: 4    Associated Diagnoses: Edema, unspecified type      gabapentin (NEURONTIN) 300 MG capsule Take 1 capsule (300 mg) by mouth 3 times daily  Qty: 270 capsule, Refills: 1    Associated Diagnoses: Chronic pain syndrome      hydrOXYzine (ATARAX) 25 MG tablet Take 1-2 tablets (25-50 mg) by mouth every 6 hours as needed for anxiety or other (sleep)  Qty: 60 tablet, Refills: 0    Associated Diagnoses: Anxiety      ipratropium - albuterol 0.5 mg/2.5 mg/3 mL (DUONEB) 0.5-2.5 (3) MG/3ML neb solution NEBULIZE THE CONTENTS OF ONE VIAL FOUR TIMES A DAY  Qty: 90 mL, Refills: 0    Associated Diagnoses: Acute respiratory failure with hypoxia (H)      methocarbamol (ROBAXIN) 500 MG tablet Take 1 tablet (500 mg) by mouth 2 times daily  Qty: 180 tablet, Refills: 1    Associated Diagnoses: Chronic pain syndrome      mirtazapine (REMERON) 15 MG tablet Take 1 tablet (15 mg) by mouth At Bedtime  Qty: 30 tablet, Refills: 1    Associated Diagnoses: Chronic insomnia; Mild major depression (H)      !! MISC NATURAL PRODUCTS PO Take 1 capsule by mouth daily B-glucan capsule      !! MISC NATURAL PRODUCTS PO Take 1 tablet by mouth daily Keto - Ketogenesis tablets      !! MISC NATURAL PRODUCTS PO Take 1 tablet by mouth daily Exipure Homeopathic      !! MISC NATURAL PRODUCTS PO Take 1 tablet by mouth daily L-Tryptophan 1000 mg daily      mometasone-formoterol (DULERA) 200-5 MCG/ACT inhaler Inhale 2 puffs into the lungs 2 times daily  Qty: 13 g, Refills: 0    Associated Diagnoses: Reactive airway  disease with acute exacerbation, unspecified asthma severity, unspecified whether persistent      Multiple Vitamins-Minerals (CENTRUM SILVER) per tablet Take 1 tablet by mouth daily      polyethylene glycol (MIRALAX) 17 GM/Dose powder Take 17 g by mouth 2 times daily as needed for constipation      pravastatin (PRAVACHOL) 20 MG tablet Take 1 tablet (20 mg) by mouth daily  Qty: 90 tablet, Refills: 1    Associated Diagnoses: Coronary artery disease involving native coronary artery of native heart without angina pectoris      QUEtiapine (SEROQUEL XR) 150 MG TB24 24 hr tablet Take 2 tablets (300 mg) by mouth At Bedtime  Qty: 60 tablet, Refills: 1    Associated Diagnoses: Chronic insomnia      QUEtiapine (SEROQUEL) 50 MG tablet Take 1 tablet (50 mg) by mouth nightly as needed (Anxiety, insomnia, hallucinations)  Qty: 30 tablet, Refills: 8    Associated Diagnoses: Chronic insomnia      senna-docusate (SENOKOT-S/PERICOLACE) 8.6-50 MG tablet Take 1 tablet by mouth 2 times daily  Qty:      Associated Diagnoses: Slow transit constipation      traZODone (DESYREL) 100 MG tablet TAKE 1 TABLET NIGHTLY AS NEEDED FOR SLEEP  Qty: 30 tablet, Refills: 1    Associated Diagnoses: Chronic insomnia      valACYclovir (VALTREX) 1000 mg tablet Take 2 tablets (2,000 mg) by mouth 2 times daily  Qty: 4 tablet, Refills: 2    Associated Diagnoses: Cold sore       !! - Potential duplicate medications found. Please discuss with provider.      STOP taking these medications       benzonatate (TESSALON) 100 MG capsule Comments:   Reason for Stopping:         losartan (COZAAR) 25 MG tablet Comments:   Reason for Stopping:             Allergies   Allergies   Allergen Reactions     Bactrim [Sulfamethoxazole W/Trimethoprim] Hives     Codeine Itching     NAUSEA     Morphine Itching     NAUSEA

## 2022-03-17 ENCOUNTER — PATIENT OUTREACH (OUTPATIENT)
Dept: CARE COORDINATION | Facility: CLINIC | Age: 77
End: 2022-03-17
Payer: COMMERCIAL

## 2022-03-17 ENCOUNTER — TELEPHONE (OUTPATIENT)
Dept: GERIATRICS | Facility: CLINIC | Age: 77
End: 2022-03-17
Payer: COMMERCIAL

## 2022-03-17 DIAGNOSIS — Z71.89 OTHER SPECIFIED COUNSELING: ICD-10-CM

## 2022-03-17 PROCEDURE — P9604 ONE-WAY ALLOW PRORATED TRIP: HCPCS | Performed by: NURSE PRACTITIONER

## 2022-03-17 PROCEDURE — 36415 COLL VENOUS BLD VENIPUNCTURE: CPT | Performed by: NURSE PRACTITIONER

## 2022-03-17 PROCEDURE — 86481 TB AG RESPONSE T-CELL SUSP: CPT | Performed by: NURSE PRACTITIONER

## 2022-03-17 RX ORDER — VALACYCLOVIR HYDROCHLORIDE 1 G/1
2000 TABLET, FILM COATED ORAL PRN
COMMUNITY
End: 2023-03-27

## 2022-03-17 NOTE — TELEPHONE ENCOUNTER
[facility] Geriatrics Triage Nurse Telephone Encounter    Provider: ARISTEO Ramírez CNP   Facility: [facility] Facility Type:  TCU    Caller: Gabrielle  Call Back Number: [phone]    Allergies:    Allergies   Allergen Reactions     Bactrim [Sulfamethoxazole W/Trimethoprim] Hives     Codeine Itching     NAUSEA     Morphine Itching     NAUSEA        Reason for call:   This pt admitted with an order for Valacyclovir 2000mg bid scheduled, nursing spoke to the pt and the pt says that she takes with when she gets a cold sore. Currently the pt doesn't have a cold sore      Verbal Order/Direction given by Provider:   Change to prn. Dosage is 2000 mg po x 1 ASAP at symptom onset of cold sore      Provider giving Order:  Antonette Gutierrez PA-C    Verbal Order given to: Gabrielle Elmore RN

## 2022-03-17 NOTE — PROGRESS NOTES
Clinic Care Coordination Contact  Care Team Conversations    Situation: RN Clinical Product Navigator following patient through TCU care progression.     Background: Patient was inpatient at [facility] 3/10/22-3/16/22 due to s/p mechanical fall, elevated CK, ongoing alcohol use disorder, alcohol withdrawal symptoms, dyspnea from recent episode of bronchitis, hyponatremia, hypomagnesemia, mild leukocytosis.  Patient was discharged to [facility] TCU on 3/16/22 for rehabilitation.    Assessment: Prior to hospitalization patient lived alone in a home with 3 stairs to enter the home.  Patient was driving.  Patient has a boyfriend who lives with her 3-4 days/week.    Patient reports drinking 1 pint of demetrice daily.  Patient reported to hospital staff that she would like to stop drinking.  Referral for inpatient chemical dependency treatment programs were sent and patient would like to admit once a bed is available.  Per hospital chart review, referral recommendation to [place].  Discharge recommendations: inpatient chemical dependency program once a bed is available, outpatient pulmonary follow-up and outpatient pulmonary function testing, outpatient cardiology follow-up for TAVR evaluation.    Plan/Recommendations: RN Clinical Product Navigator will send TCU Care Team Care Management hand in communication and request involvement in discharge planning and coordination of care.      Melissa Behl BSN, RN, PHN, CCM  RN Clinical Product Navigator  [phone]

## 2022-03-18 ENCOUNTER — TRANSITIONAL CARE UNIT VISIT (OUTPATIENT)
Dept: GERIATRICS | Facility: CLINIC | Age: 77
End: 2022-03-18
Payer: COMMERCIAL

## 2022-03-18 ENCOUNTER — DOCUMENTATION ONLY (OUTPATIENT)
Dept: OTHER | Facility: CLINIC | Age: 77
End: 2022-03-18

## 2022-03-18 VITALS
BODY MASS INDEX: 32.68 KG/M2 | SYSTOLIC BLOOD PRESSURE: 108 MMHG | WEIGHT: 190.4 LBS | RESPIRATION RATE: 18 BRPM | OXYGEN SATURATION: 93 % | TEMPERATURE: 97.2 F | HEART RATE: 70 BPM | DIASTOLIC BLOOD PRESSURE: 71 MMHG
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
## 2022-03-18 DIAGNOSIS — F32.A DEPRESSION, UNSPECIFIED DEPRESSION TYPE: ICD-10-CM

## 2022-03-18 DIAGNOSIS — G47.00 INSOMNIA, UNSPECIFIED TYPE: ICD-10-CM

## 2022-03-18 DIAGNOSIS — J44.9: ICD-10-CM

## 2022-03-18 DIAGNOSIS — R60.9 EDEMA, UNSPECIFIED TYPE: ICD-10-CM

## 2022-03-18 DIAGNOSIS — D64.9 ANEMIA, UNSPECIFIED TYPE: ICD-10-CM

## 2022-03-18 DIAGNOSIS — N18.30 STAGE 3 CHRONIC KIDNEY DISEASE, UNSPECIFIED WHETHER STAGE 3A OR 3B CKD (H): ICD-10-CM

## 2022-03-18 DIAGNOSIS — N39.0 URINARY TRACT INFECTION WITHOUT HEMATURIA, SITE UNSPECIFIED: ICD-10-CM

## 2022-03-18 DIAGNOSIS — F10.930 ALCOHOL WITHDRAWAL SYNDROME WITHOUT COMPLICATION (H): ICD-10-CM

## 2022-03-18 DIAGNOSIS — F10.21 ALCOHOL DEPENDENCE IN REMISSION (H): ICD-10-CM

## 2022-03-18 DIAGNOSIS — I35.0 AORTIC VALVE STENOSIS, ETIOLOGY OF CARDIAC VALVE DISEASE UNSPECIFIED: ICD-10-CM

## 2022-03-18 DIAGNOSIS — R74.8 ELEVATED CK: ICD-10-CM

## 2022-03-18 DIAGNOSIS — E83.42 HYPOMAGNESEMIA: ICD-10-CM

## 2022-03-18 DIAGNOSIS — I50.30 HEART FAILURE WITH PRESERVED EJECTION FRACTION, NYHA CLASS I (H): ICD-10-CM

## 2022-03-18 LAB
QUANTIFERON MITOGEN: 10 IU/ML
QUANTIFERON NIL TUBE: 0.2 IU/ML
QUANTIFERON TB1 TUBE: 0.19 IU/ML
QUANTIFERON TB2 TUBE: 0.19

## 2022-03-18 PROCEDURE — 99310 SBSQ NF CARE HIGH MDM 45: CPT | Performed by: PHYSICIAN ASSISTANT

## 2022-03-18 RX ORDER — FUROSEMIDE 20 MG
TABLET ORAL
Qty: 100 TABLET | Refills: 4
Start: 2022-03-18 | End: 2022-03-24

## 2022-03-18 RX ORDER — POTASSIUM CHLORIDE 750 MG/1
20 TABLET, EXTENDED RELEASE ORAL DAILY
Qty: 6 TABLET | Refills: 0
Start: 2022-03-18 | End: 2022-03-21

## 2022-03-18 NOTE — LETTER
3/18/2022        RE: Kavitha Headley  [address]  [facility] [address]        [facility] GERIATRICS    PRIMARY CARE PROVIDER AND CLINIC:  Nayeli Castorena PA-C, [address] [facility] [address]  Chief Complaint   Patient presents with     [facility] Medical Record Number:  [id number]  Place of Service where encounter took place:  [facility] ([facility]) [[id number]]    Kavitha Headley  is a 76 year old  (1945), admitted to the above facility from  [facility]. Hospital stay 3/10/22 through 3/16/22..   HPI:    Kavitha Headley is a 76-year-old female with a past medical history of alcohol use disorder, chronic bronchitis, chronic anemia, HFpEF, aortic stenosis, depression and anxiety who was recently admitted to [facility] for evaluation of a fall, elevated CK and alcohol withdrawal.  She apparently suffered a mechanical fall while walking to the bathroom.  She was unable to get up and it took her 2 days to crawl and call EMS.  In the emergency department she was found to have a slightly elevated CK of 460 but no other apparent injuries.  She was admitted and treated with IV fluids.  She was noted to be in acute alcohol withdrawal on admission with tachycardia and tremulousness, though did not require as needed medications for treatment of alcohol withdrawal.  She is in agreement to go to inpatient treatment but is discharged to [facility] for further for rehab prior to discharge to inpatient rehab    Today Linda is seen for an initial visit.  She notes overall she is doing well.  She does complain of some worsening lower extremity edema that she finds painful.  She typically takes Lasix 20 mg daily.  She does have known severe aortic stenosis but denies any orthopnea or PND.  Has chronic shortness of breath related to questionable underlying COPD for which she uses inhalers.  Denies any abdominal pain.  No  nausea or vomiting.  No issues with constipation.  Historically she lives independently in her own home though she does have a significant other who stays with her on the weekends.  Does not use assistive devices.    CODE STATUS/ADVANCE DIRECTIVES DISCUSSION:  Full Code  CPR/Full code   ALLERGIES:   Allergies   Allergen Reactions     Bactrim [Sulfamethoxazole W/Trimethoprim] Hives     Codeine Itching     NAUSEA     Morphine Itching     NAUSEA      PAST MEDICAL HISTORY:   Past Medical History:   Diagnosis Date     Alcohol abuse     Long term alcohol abuse. Abstinenet since October 2019.     Anxiety disorder      Ascending aorta dilatation (H)      Bariatric surgery status 1996?    gastric bypass, [facility] and     Benign hypertension      Chronic insomnia      Chronic pain syndrome     Chronic back and neck pain, chronic pain due to osteoarthritis multiple joints     Coronary artery disease involving native coronary artery of native heart without angina pectoris 10/16/2018    Minimal coronary artery disease on coronary angiogram in 2015.      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     Major depressive disorder, single episode, severe, without mention of psychotic behavior      Mixed hyperlipidemia      Moderate aortic stenosis 05/2014    moderate to severe aortic valve stenosis     Pelvic relaxation disorder     Surgical intervention for cystocele/rectocele 3,11/2012     Personal history of urinary calculi 6/2006    left ureteral stone,lithotripsy     Psoriasis      PVC (premature ventricular contraction)      Spinal stenosis      Stage III chronic kidney disease (H) 2005     SVT (supraventricular tachycardia) (H)     likely atrial tachycardia      PAST SURGICAL  HISTORY:   has a past surgical history that includes Hysterectomy vaginal, bilateral salpingo-oopherectomy, combined (1998); GASTRIC BYPASS,OBESE<100CM ARIANNA-EN-Y (1996); Laparotomy, lysis adhesions, combined (3/2004); appendectomy (3/2004); joint replacement, hip rt/lt (4/2004); TOTAL KNEE ARTHROPLASTY (12/2005); Cystoscopy, lithotripsy, combined (6/2006); Cystoscopy, remove stent(s), combined (7/2006); MEDIASTINOSCOPY (2/2008); lymph node biopsy (4/2008); carpal tunnel release rt/lt (10/2010); cholecystectomy, laporoscopic (11/2010); REPAIR OF RECTOCELE (3/2012); hernia repair (4/2012); Mammoplasty augmentation bilateral (4/2012); Revise reconstructed breast (6/7/2012); cystocele repair (11/2012); Colonoscopy (N/A, 9/8/2016); Endoscopic ultrasound upper gastrointestinal tract (GI) (N/A, 6/12/2017); Arthrodesis toe(s) (Right, 1/31/2020); and Repair hammer toe (Right, 1/31/2020).  FAMILY HISTORY: family history includes Cancer in her father; Substance Abuse in her father.  SOCIAL HISTORY:   reports that she has never smoked. She has never used smokeless tobacco. She reports current alcohol use of about 63.0 standard drinks of alcohol per week. She reports that she does not use drugs.  Patient's living condition: lives alone    Post Discharge Medication Reconciliation Status: discharge medications reconciled and changed, per note/orders  Current Outpatient Medications   Medication Sig     acamprosate (CAMPRAL) 333 MG EC tablet Take 2 tablets (666 mg) by mouth 3 times daily     acetaminophen (TYLENOL) 500 MG tablet Take 1,000 mg by mouth 2 times daily as needed for pain     albuterol (PROAIR HFA/PROVENTIL HFA/VENTOLIN HFA) 108 (90 Base) MCG/ACT inhaler Inhale 2 puffs into the lungs every 6 hours as needed for wheezing     aspirin 81 MG EC tablet Take 81 mg by mouth daily     atenolol (TENORMIN) 25 MG tablet Take 1 tablet (25 mg) by mouth daily     ciprofloxacin (CIPRO) 500 MG tablet Take 1 tablet (500 mg) by mouth  every 12 hours for 5 days     citalopram (CELEXA) 20 MG tablet Take 1 tablet (20 mg) by mouth daily     diclofenac (VOLTAREN) 50 MG EC tablet Take 1 tablet (50 mg) by mouth 2 times daily as needed for moderate pain     folic acid (FOLVITE) 1 MG tablet Take 1 mg by mouth daily     furosemide (LASIX) 20 MG tablet Take 30 mg daily x 3 days and return to 20 mg/d     gabapentin (NEURONTIN) 300 MG capsule Take 1 capsule (300 mg) by mouth 3 times daily     hydrOXYzine (ATARAX) 25 MG tablet Take 1-2 tablets (25-50 mg) by mouth every 6 hours as needed for anxiety or other (sleep)     ipratropium - albuterol 0.5 mg/2.5 mg/3 mL (DUONEB) 0.5-2.5 (3) MG/3ML neb solution NEBULIZE THE CONTENTS OF ONE VIAL FOUR TIMES A DAY     methocarbamol (ROBAXIN) 500 MG tablet Take 1 tablet (500 mg) by mouth 2 times daily     mirtazapine (REMERON) 15 MG tablet Take 1 tablet (15 mg) by mouth At Bedtime     mometasone-formoterol (DULERA) 200-5 MCG/ACT inhaler Inhale 2 puffs into the lungs 2 times daily     Multiple Vitamins-Minerals (CENTRUM SILVER) per tablet Take 1 tablet by mouth daily     polyethylene glycol (MIRALAX) 17 GM/Dose powder Take 17 g by mouth 2 times daily as needed for constipation     potassium chloride ER (KLOR-CON M) 10 MEQ CR tablet Take 2 tablets (20 mEq) by mouth daily for 3 days     pravastatin (PRAVACHOL) 20 MG tablet Take 1 tablet (20 mg) by mouth daily     QUEtiapine (SEROQUEL XR) 150 MG TB24 24 hr tablet Take 2 tablets (300 mg) by mouth At Bedtime     QUEtiapine (SEROQUEL) 50 MG tablet Take 1 tablet (50 mg) by mouth nightly as needed (Anxiety, insomnia, hallucinations)     senna-docusate (SENOKOT-S/PERICOLACE) 8.6-50 MG tablet Take 1 tablet by mouth 2 times daily     traZODone (DESYREL) 100 MG tablet TAKE 1 TABLET NIGHTLY AS NEEDED FOR SLEEP     valACYclovir (VALTREX) 1000 mg tablet Take 2,000 mg by mouth as needed X1 ASAP at symptom onset of cold sore     MISC NATURAL PRODUCTS PO Take 1 capsule by mouth daily B-glucan  capsule (Patient not taking: Reported on 3/18/2022)     MISC NATURAL PRODUCTS PO Take 1 tablet by mouth daily Keto - Ketogenesis tablets (Patient not taking: Reported on 3/18/2022)     MISC NATURAL PRODUCTS PO Take 1 tablet by mouth daily Exipure Homeopathic (Patient not taking: Reported on 3/18/2022)     MISC NATURAL PRODUCTS PO Take 1 tablet by mouth daily L-Tryptophan 1000 mg daily (Patient not taking: Reported on 3/18/2022)     No current facility-administered medications for this visit.       ROS:  10 point ROS of systems including Constitutional, Eyes, Respiratory, Cardiovascular, Gastroenterology, Genitourinary, Integumentary, Musculoskeletal, Psychiatric were all negative except for pertinent positives noted in my HPI.    Vitals:  /71   Pulse 70   Temp 97.2  F (36.2  C)   Resp 18   Wt 86.4 kg (190 lb 6.4 oz)   SpO2 93%   BMI 32.68 kg/m    Exam:  Physical Exam  Vitals and nursing note reviewed.   Constitutional:       General: She is not in acute distress.     Appearance: Normal appearance. She is not toxic-appearing.   HENT:      Head: Normocephalic and atraumatic.   Eyes:      General: No scleral icterus.  Cardiovascular:      Rate and Rhythm: Normal rate and regular rhythm.      Heart sounds: No murmur heard.  Pulmonary:      Effort: Pulmonary effort is normal.      Breath sounds: Normal breath sounds. No wheezing.   Musculoskeletal:         General: Normal range of motion.      Comments: 2+ edema BLE, chronic venous status changes on the left LLE   Skin:     General: Skin is warm and dry.      Findings: No rash.   Neurological:      General: No focal deficit present.      Mental Status: She is alert.      Cranial Nerves: No cranial nerve deficit.   Psychiatric:         Mood and Affect: Mood normal.         Behavior: Behavior normal.           Lab/Diagnostic data:  Recent labs in [facility] reviewed by me today.  and   Most Recent 3 CBC's:  Recent Labs   Lab Test 03/14/22  0640 03/12/22  0801  03/11/22  0645   WBC 6.9 7.5 7.1   HGB 9.7* 8.8* 8.3*   MCV 92 88 88    278 276     Most Recent 3 BMP's:  Recent Labs   Lab Test 03/15/22  1514 03/14/22  0640 03/13/22  0817 03/12/22  1454 03/12/22  0801    140  --   --  137   POTASSIUM 3.9 4.3 4.2   < > 3.3*   CHLORIDE 105 109  --   --  106   CO2 31 27  --   --  26   BUN 15 12  --   --  10   CR 1.14* 0.99  --   --  0.88   ANIONGAP 3 4  --   --  5   BIRGIT 8.8 8.6  --   --  8.2*   GLC 89 94  --   --  91    < > = values in this interval not displayed.     Most Recent 2 LFT's:  Recent Labs   Lab Test 03/10/22  1454 12/29/21  0439   AST 41 17   ALT 23 25   ALKPHOS 111 66   BILITOTAL 0.7 0.4     Most Recent 3 INR's:  Recent Labs   Lab Test 03/30/21  0000 06/16/17  0845 06/11/17  1540   INR 0.9 1.03 1.15*     Most Recent 3 BNP's:  Recent Labs   Lab Test 03/15/22  1514 03/10/22  2124 12/17/21  1402   NTBNPI 959 1,712 2,896*     Most Recent 6 Bacteria Isolates From Any Culture (See EPIC Reports for Culture Details):  Recent Labs   Lab Test 06/06/17  1530 06/06/17  1457 04/30/14  1815 02/07/14  1445   CULT No growth No growth >100,000 colonies/mL Escherichia coli* >100,000 colonies/mL Mixed gram negative and positive johana Multiple species present, probable perineal contamination. Susceptibility testing not routinely done     Most Recent TSH and T4:  Recent Labs   Lab Test 03/03/21  1612   TSH 1.05     Most Recent Hemoglobin A1c:No lab results found.  Most Recent Urinalysis:  Recent Labs   Lab Test 03/11/22  0004 03/24/21  0025 12/04/19  1549   COLOR Light Yellow   < > Yellow   APPEARANCE Slightly Cloudy*   < > Clear   URINEGLC Negative   < > Negative   URINEBILI Negative   < > Negative   URINEKETONE Negative   < > Negative   SG 1.005   < > 1.010   UBLD Negative   < > Negative   URINEPH 6.0   < > 5.5   PROTEIN Negative   < > Negative   UROBILINOGEN  --   --  0.2   NITRITE Negative   < > Negative   LEUKEST Large*   < > Trace*   RBCU 3*   < > O - 2   WBCU 21*   < >  0 - 5    < > = values in this interval not displayed.     Most Recent Anemia Panel:  Recent Labs   Lab Test 03/14/22  0640 12/19/21  0858 12/18/21  0758 03/23/21  1506 03/03/21  1613 03/03/21  1612 06/15/17  1714 06/14/17  0812 06/13/17  0800   WBC 6.9   < >  --    < >  --  8.2   < >  --  7.2   HGB 9.7*   < >  --    < >  --  11.4*   < >  --  11.7   HCT 30.8*   < >  --    < >  --  34.7*   < >  --  33.1*   MCV 92   < >  --    < >  --  91   < >  --  103*      < >  --    < >  --  395   < > 61* 64*   IRON  --   --   --   --   --   --   --  123  --    IRONSAT  --   --   --   --   --   --   --  98*  --    RETICABSCT  --   --   --   --   --   --   --   --  95.6*   RETP  --   --   --   --   --   --   --   --  3.0*   FEB  --   --   --   --   --   --   --  126*  --    CHUCK  --   --   --   --   --  16   < > 167  --    B12  --   --  843  --   --  645   < > 1,832*  --    FOLIC  --   --   --   --  66.6  --    < > 26.9  --     < > = values in this interval not displayed.     Most Recent CPK:  Recent Labs   Lab Test 03/12/22  0801 03/11/22  0645 03/10/22  1454    392* 460*       ASSESSMENT/PLAN:    (W19.XXXD) Fall, subsequent encounter  (primary encounter diagnosis)  (R74.8) Elevated CK: Presented after mechanical fall.  Laid on the floor for 2 days prior to calling for help.  CK elevated in the 400s admission to the hospital but resolved with IV fluids.  -PT/OT/social work    (F10.21) Alcohol dependence in remission (H)  (F10.230) Alcohol withdrawal syndrome without complication (H): Reportedly drinking several glasses of demetrice nightly.  Appeared to be in acute alcohol withdrawal on admission note did not require medications for alcohol withdrawal during hospital stay.  Seen by chemical dependency and motivated to pursue inpatient treatment but needing rehab prior  - Continue Campral  -Encouraged ongoing cessation  -Social work following    (N39.0) Urinary tract infection without hematuria, site unspecified: Noted to  have leukocytosis in the hospital.  UA slightly abnormal.  UC with greater than 100,000 colonies pansensitive E. coli.  Patient denies ever having symptoms.    -Continues on ciprofloxacin to complete course.  Course to be completed 3/21/2022    (I50.30) Heart failure with preserved ejection fraction, NYHA class I (H)  (I35.0) Aortic valve stenosis, etiology of cardiac valve disease unspecified  (R60.9) Edema, unspecified type: Repeat TTE 3/12/2022 with EF 60-65% severe aortic stenosis with mean gradient 48 mm per mercury and calculated RENE area 0.9 cm .  Compared to previous 10/21 there has been interval increase.  She takes Lasix daily.  She notes increasing lower extremity edema though thankfully no respiratory symptoms and lungs are clear.  -Increase Lasix to 30 mg daily x3 days then return to 20 mg daily and reassess  -Add KCl 20 mEq daily with increase in Lasix  - Patient to follow-up with cardiology clinic on discharge and pursue work-up for potential TAVR    (N18.30) Stage 3 chronic kidney disease, unspecified whether stage 3a or 3b CKD (H): Baseline creatinine 0.9-1.  Did receive some IV Lasix in the hospital and creatinine increased slightly to 1.14  -Adjusting diuretics as above  -BMP 3/21    (E83.42) Hypomagnesemia: Replaced in the hospital but not currently on oral magnesium replacement  -Check mag 3/21/2022    (D64.9) Anemia, unspecified type: Baseline hemoglobin 10/11.  Did fall to 9.7 in the hospital but may be secondary to hemodilution due to IV fluids for elevated CK.  No signs or symptoms of bleeding  -Repeat hemoglobin 3/21/2022    (J44.9) Chronic obstructive pulmonary disease with bronchospasm (H)  -Does not appear to be in acute exacerbation.  Continues on previous inhalers    (F32.A) Depression, unspecified depression type  (G47.00) Insomnia, unspecified type  -Continues previous Seroquel, Remeron and trazodone    Orders:  BMP, CBC, Mg 3/21/2022  Increase Lasix to 30 mg p.o. daily x3 days then  return to 20 mg daily diagnosis edema  Compression socks to bilateral lower extremities  Potassium chloride 20 mEq p.o. daily x3 days    A total 38 min were spent on this initial visit. With > 50% spent on coordination of care including extensive review of recent hospitalization, review and verification of facility orders, as well as discussion of plan of care with patient at bedside. Further details as discussed in HPI.      Electronically signed by:  Antonette Gutierrez PA-C                       Sincerely,        Antonette Gutierrez PA-C

## 2022-03-18 NOTE — PROGRESS NOTES
[facility] GERIATRICS    PRIMARY CARE PROVIDER AND CLINIC:  Nayeli Castorena PA-C, [address] [facility] / [facility] [address]  Chief Complaint   Patient presents with     [facility] Medical Record Number:  [id number]  Place of Service where encounter took place:  [facility] ([facility]) [[id number]]    Kavitha Headley  is a 76 year old  (1945), admitted to the above facility from  [facility]. Hospital stay 3/10/22 through 3/16/22..   HPI:    Kavitha Headley is a 76-year-old female with a past medical history of alcohol use disorder, chronic bronchitis, chronic anemia, HFpEF, aortic stenosis, depression and anxiety who was recently admitted to [facility] for evaluation of a fall, elevated CK and alcohol withdrawal.  She apparently suffered a mechanical fall while walking to the bathroom.  She was unable to get up and it took her 2 days to crawl and call EMS.  In the emergency department she was found to have a slightly elevated CK of 460 but no other apparent injuries.  She was admitted and treated with IV fluids.  She was noted to be in acute alcohol withdrawal on admission with tachycardia and tremulousness, though did not require as needed medications for treatment of alcohol withdrawal.  She is in agreement to go to inpatient treatment but is discharged to [facility] for further for rehab prior to discharge to inpatient rehab    Today Linda is seen for an initial visit.  She notes overall she is doing well.  She does complain of some worsening lower extremity edema that she finds painful.  She typically takes Lasix 20 mg daily.  She does have known severe aortic stenosis but denies any orthopnea or PND.  Has chronic shortness of breath related to questionable underlying COPD for which she uses inhalers.  Denies any abdominal pain.  No nausea or vomiting.  No issues with constipation.  Historically she lives independently in her  own home though she does have a significant other who stays with her on the weekends.  Does not use assistive devices.    CODE STATUS/ADVANCE DIRECTIVES DISCUSSION:  Full Code  CPR/Full code   ALLERGIES:   Allergies   Allergen Reactions     Bactrim [Sulfamethoxazole W/Trimethoprim] Hives     Codeine Itching     NAUSEA     Morphine Itching     NAUSEA      PAST MEDICAL HISTORY:   Past Medical History:   Diagnosis Date     Alcohol abuse     Long term alcohol abuse. Abstinenet since October 2019.     Anxiety disorder      Ascending aorta dilatation (H)      Bariatric surgery status 1996?    gastric bypass, [facility] and     Benign hypertension      Chronic insomnia      Chronic pain syndrome     Chronic back and neck pain, chronic pain due to osteoarthritis multiple joints     Coronary artery disease involving native coronary artery of native heart without angina pectoris 10/16/2018    Minimal coronary artery disease on coronary angiogram in 2015.      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     Major depressive disorder, single episode, severe, without mention of psychotic behavior      Mixed hyperlipidemia      Moderate aortic stenosis 05/2014    moderate to severe aortic valve stenosis     Pelvic relaxation disorder     Surgical intervention for cystocele/rectocele 3,11/2012     Personal history of urinary calculi 6/2006    left ureteral stone,lithotripsy     Psoriasis      PVC (premature ventricular contraction)      Spinal stenosis      Stage III chronic kidney disease (H) 2005     SVT (supraventricular tachycardia) (H)     likely atrial tachycardia      PAST SURGICAL HISTORY:   has a past surgical history that includes Hysterectomy vaginal, bilateral  salpingo-oopherectomy, combined (1998); GASTRIC BYPASS,OBESE<100CM ARIANNA-EN-Y (1996); Laparotomy, lysis adhesions, combined (3/2004); appendectomy (3/2004); joint replacement, hip rt/lt (4/2004); TOTAL KNEE ARTHROPLASTY (12/2005); Cystoscopy, lithotripsy, combined (6/2006); Cystoscopy, remove stent(s), combined (7/2006); MEDIASTINOSCOPY (2/2008); lymph node biopsy (4/2008); carpal tunnel release rt/lt (10/2010); cholecystectomy, laporoscopic (11/2010); REPAIR OF RECTOCELE (3/2012); hernia repair (4/2012); Mammoplasty augmentation bilateral (4/2012); Revise reconstructed breast (6/7/2012); cystocele repair (11/2012); Colonoscopy (N/A, 9/8/2016); Endoscopic ultrasound upper gastrointestinal tract (GI) (N/A, 6/12/2017); Arthrodesis toe(s) (Right, 1/31/2020); and Repair hammer toe (Right, 1/31/2020).  FAMILY HISTORY: family history includes Cancer in her father; Substance Abuse in her father.  SOCIAL HISTORY:   reports that she has never smoked. She has never used smokeless tobacco. She reports current alcohol use of about 63.0 standard drinks of alcohol per week. She reports that she does not use drugs.  Patient's living condition: lives alone    Post Discharge Medication Reconciliation Status: discharge medications reconciled and changed, per note/orders  Current Outpatient Medications   Medication Sig     acamprosate (CAMPRAL) 333 MG EC tablet Take 2 tablets (666 mg) by mouth 3 times daily     acetaminophen (TYLENOL) 500 MG tablet Take 1,000 mg by mouth 2 times daily as needed for pain     albuterol (PROAIR HFA/PROVENTIL HFA/VENTOLIN HFA) 108 (90 Base) MCG/ACT inhaler Inhale 2 puffs into the lungs every 6 hours as needed for wheezing     aspirin 81 MG EC tablet Take 81 mg by mouth daily     atenolol (TENORMIN) 25 MG tablet Take 1 tablet (25 mg) by mouth daily     ciprofloxacin (CIPRO) 500 MG tablet Take 1 tablet (500 mg) by mouth every 12 hours for 5 days     citalopram (CELEXA) 20 MG tablet Take 1 tablet (20 mg) by  mouth daily     diclofenac (VOLTAREN) 50 MG EC tablet Take 1 tablet (50 mg) by mouth 2 times daily as needed for moderate pain     folic acid (FOLVITE) 1 MG tablet Take 1 mg by mouth daily     furosemide (LASIX) 20 MG tablet Take 30 mg daily x 3 days and return to 20 mg/d     gabapentin (NEURONTIN) 300 MG capsule Take 1 capsule (300 mg) by mouth 3 times daily     hydrOXYzine (ATARAX) 25 MG tablet Take 1-2 tablets (25-50 mg) by mouth every 6 hours as needed for anxiety or other (sleep)     ipratropium - albuterol 0.5 mg/2.5 mg/3 mL (DUONEB) 0.5-2.5 (3) MG/3ML neb solution NEBULIZE THE CONTENTS OF ONE VIAL FOUR TIMES A DAY     methocarbamol (ROBAXIN) 500 MG tablet Take 1 tablet (500 mg) by mouth 2 times daily     mirtazapine (REMERON) 15 MG tablet Take 1 tablet (15 mg) by mouth At Bedtime     mometasone-formoterol (DULERA) 200-5 MCG/ACT inhaler Inhale 2 puffs into the lungs 2 times daily     Multiple Vitamins-Minerals (CENTRUM SILVER) per tablet Take 1 tablet by mouth daily     polyethylene glycol (MIRALAX) 17 GM/Dose powder Take 17 g by mouth 2 times daily as needed for constipation     potassium chloride ER (KLOR-CON M) 10 MEQ CR tablet Take 2 tablets (20 mEq) by mouth daily for 3 days     pravastatin (PRAVACHOL) 20 MG tablet Take 1 tablet (20 mg) by mouth daily     QUEtiapine (SEROQUEL XR) 150 MG TB24 24 hr tablet Take 2 tablets (300 mg) by mouth At Bedtime     QUEtiapine (SEROQUEL) 50 MG tablet Take 1 tablet (50 mg) by mouth nightly as needed (Anxiety, insomnia, hallucinations)     senna-docusate (SENOKOT-S/PERICOLACE) 8.6-50 MG tablet Take 1 tablet by mouth 2 times daily     traZODone (DESYREL) 100 MG tablet TAKE 1 TABLET NIGHTLY AS NEEDED FOR SLEEP     valACYclovir (VALTREX) 1000 mg tablet Take 2,000 mg by mouth as needed X1 ASAP at symptom onset of cold sore     MISC NATURAL PRODUCTS PO Take 1 capsule by mouth daily B-glucan capsule (Patient not taking: Reported on 3/18/2022)     [facility] NATURAL PRODUCTS PO Take 1  tablet by mouth daily Keto - Ketogenesis tablets (Patient not taking: Reported on 3/18/2022)     MISC NATURAL PRODUCTS PO Take 1 tablet by mouth daily Exipure Homeopathic (Patient not taking: Reported on 3/18/2022)     MISC NATURAL PRODUCTS PO Take 1 tablet by mouth daily L-Tryptophan 1000 mg daily (Patient not taking: Reported on 3/18/2022)     No current facility-administered medications for this visit.       ROS:  10 point ROS of systems including Constitutional, Eyes, Respiratory, Cardiovascular, Gastroenterology, Genitourinary, Integumentary, Musculoskeletal, Psychiatric were all negative except for pertinent positives noted in my HPI.    Vitals:  /71   Pulse 70   Temp 97.2  F (36.2  C)   Resp 18   Wt 86.4 kg (190 lb 6.4 oz)   SpO2 93%   BMI 32.68 kg/m    Exam:  Physical Exam  Vitals and nursing note reviewed.   Constitutional:       General: She is not in acute distress.     Appearance: Normal appearance. She is not toxic-appearing.   HENT:      Head: Normocephalic and atraumatic.   Eyes:      General: No scleral icterus.  Cardiovascular:      Rate and Rhythm: Normal rate and regular rhythm.      Heart sounds: No murmur heard.  Pulmonary:      Effort: Pulmonary effort is normal.      Breath sounds: Normal breath sounds. No wheezing.   Musculoskeletal:         General: Normal range of motion.      Comments: 2+ edema BLE, chronic venous status changes on the left LLE   Skin:     General: Skin is warm and dry.      Findings: No rash.   Neurological:      General: No focal deficit present.      Mental Status: She is alert.      Cranial Nerves: No cranial nerve deficit.   Psychiatric:         Mood and Affect: Mood normal.         Behavior: Behavior normal.           Lab/Diagnostic data:  Recent labs in [facility] reviewed by me today.  and   Most Recent 3 CBC's:  Recent Labs   Lab Test 03/14/22  0640 03/12/22  0801 03/11/22  0645   WBC 6.9 7.5 7.1   HGB 9.7* 8.8* 8.3*   MCV 92 88 88    278 276     Most  Recent 3 BMP's:  Recent Labs   Lab Test 03/15/22  1514 03/14/22  0640 03/13/22  0817 03/12/22  1454 03/12/22  0801    140  --   --  137   POTASSIUM 3.9 4.3 4.2   < > 3.3*   CHLORIDE 105 109  --   --  106   CO2 31 27  --   --  26   BUN 15 12  --   --  10   CR 1.14* 0.99  --   --  0.88   ANIONGAP 3 4  --   --  5   BIRGIT 8.8 8.6  --   --  8.2*   GLC 89 94  --   --  91    < > = values in this interval not displayed.     Most Recent 2 LFT's:  Recent Labs   Lab Test 03/10/22  1454 12/29/21  0439   AST 41 17   ALT 23 25   ALKPHOS 111 66   BILITOTAL 0.7 0.4     Most Recent 3 INR's:  Recent Labs   Lab Test 03/30/21  0000 06/16/17  0845 06/11/17  1540   INR 0.9 1.03 1.15*     Most Recent 3 BNP's:  Recent Labs   Lab Test 03/15/22  1514 03/10/22  2124 12/17/21  1402   NTBNPI 959 1,712 2,896*     Most Recent 6 Bacteria Isolates From Any Culture (See EPIC Reports for Culture Details):  Recent Labs   Lab Test 06/06/17  1530 06/06/17  1457 04/30/14  1815 02/07/14  1445   CULT No growth No growth >100,000 colonies/mL Escherichia coli* >100,000 colonies/mL Mixed gram negative and positive johana Multiple species present, probable perineal contamination. Susceptibility testing not routinely done     Most Recent TSH and T4:  Recent Labs   Lab Test 03/03/21  1612   TSH 1.05     Most Recent Hemoglobin A1c:No lab results found.  Most Recent Urinalysis:  Recent Labs   Lab Test 03/11/22  0004 03/24/21  0025 12/04/19  1549   COLOR Light Yellow   < > Yellow   APPEARANCE Slightly Cloudy*   < > Clear   URINEGLC Negative   < > Negative   URINEBILI Negative   < > Negative   URINEKETONE Negative   < > Negative   SG 1.005   < > 1.010   UBLD Negative   < > Negative   URINEPH 6.0   < > 5.5   PROTEIN Negative   < > Negative   UROBILINOGEN  --   --  0.2   NITRITE Negative   < > Negative   LEUKEST Large*   < > Trace*   RBCU 3*   < > O - 2   WBCU 21*   < > 0 - 5    < > = values in this interval not displayed.     Most Recent Anemia Panel:  Recent Labs    Lab Test 03/14/22  0640 12/19/21  0858 12/18/21  0758 03/23/21  1506 03/03/21  1613 03/03/21  1612 06/15/17  1714 06/14/17  0812 06/13/17  0800   WBC 6.9   < >  --    < >  --  8.2   < >  --  7.2   HGB 9.7*   < >  --    < >  --  11.4*   < >  --  11.7   HCT 30.8*   < >  --    < >  --  34.7*   < >  --  33.1*   MCV 92   < >  --    < >  --  91   < >  --  103*      < >  --    < >  --  395   < > 61* 64*   IRON  --   --   --   --   --   --   --  123  --    IRONSAT  --   --   --   --   --   --   --  98*  --    RETICABSCT  --   --   --   --   --   --   --   --  95.6*   RETP  --   --   --   --   --   --   --   --  3.0*   FEB  --   --   --   --   --   --   --  126*  --    CHUCK  --   --   --   --   --  16   < > 167  --    B12  --   --  843  --   --  645   < > 1,832*  --    FOLIC  --   --   --   --  66.6  --    < > 26.9  --     < > = values in this interval not displayed.     Most Recent CPK:  Recent Labs   Lab Test 03/12/22  0801 03/11/22  0645 03/10/22  1454    392* 460*       ASSESSMENT/PLAN:    (W19.XXXD) Fall, subsequent encounter  (primary encounter diagnosis)  (R74.8) Elevated CK: Presented after mechanical fall.  Laid on the floor for 2 days prior to calling for help.  CK elevated in the 400s admission to the hospital but resolved with IV fluids.  -PT/OT/social work    (F10.21) Alcohol dependence in remission (H)  (F10.230) Alcohol withdrawal syndrome without complication (H): Reportedly drinking several glasses of demetrice nightly.  Appeared to be in acute alcohol withdrawal on admission note did not require medications for alcohol withdrawal during hospital stay.  Seen by chemical dependency and motivated to pursue inpatient treatment but needing rehab prior  - Continue Campral  -Encouraged ongoing cessation  -Social work following    (N39.0) Urinary tract infection without hematuria, site unspecified: Noted to have leukocytosis in the hospital.  UA slightly abnormal.  UC with greater than 100,000 colonies  pansensitive E. coli.  Patient denies ever having symptoms.    -Continues on ciprofloxacin to complete course.  Course to be completed 3/21/2022    (I50.30) Heart failure with preserved ejection fraction, NYHA class I (H)  (I35.0) Aortic valve stenosis, etiology of cardiac valve disease unspecified  (R60.9) Edema, unspecified type: Repeat TTE 3/12/2022 with EF 60-65% severe aortic stenosis with mean gradient 48 mm per mercury and calculated RENE area 0.9 cm .  Compared to previous 10/21 there has been interval increase.  She takes Lasix daily.  She notes increasing lower extremity edema though thankfully no respiratory symptoms and lungs are clear.  -Increase Lasix to 30 mg daily x3 days then return to 20 mg daily and reassess  -Add KCl 20 mEq daily with increase in Lasix  - Patient to follow-up with cardiology clinic on discharge and pursue work-up for potential TAVR    (N18.30) Stage 3 chronic kidney disease, unspecified whether stage 3a or 3b CKD (H): Baseline creatinine 0.9-1.  Did receive some IV Lasix in the hospital and creatinine increased slightly to 1.14  -Adjusting diuretics as above  -BMP 3/21    (E83.42) Hypomagnesemia: Replaced in the hospital but not currently on oral magnesium replacement  -Check mag 3/21/2022    (D64.9) Anemia, unspecified type: Baseline hemoglobin 10/11.  Did fall to 9.7 in the hospital but may be secondary to hemodilution due to IV fluids for elevated CK.  No signs or symptoms of bleeding  -Repeat hemoglobin 3/21/2022    (J44.9) Chronic obstructive pulmonary disease with bronchospasm (H)  -Does not appear to be in acute exacerbation.  Continues on previous inhalers    (F32.A) Depression, unspecified depression type  (G47.00) Insomnia, unspecified type  -Continues previous Seroquel, Remeron and trazodone    Orders:  BMP, CBC, Mg 3/21/2022  Increase Lasix to 30 mg p.o. daily x3 days then return to 20 mg daily diagnosis edema  Compression socks to bilateral lower  extremities  Potassium chloride 20 mEq p.o. daily x3 days    A total 38 min were spent on this initial visit. With > 50% spent on coordination of care including extensive review of recent hospitalization, review and verification of facility orders, as well as discussion of plan of care with patient at bedside. Further details as discussed in HPI.      Electronically signed by:  Antonette Gutierrez PA-C

## 2022-03-19 LAB
GAMMA INTERFERON BACKGROUND BLD IA-ACNC: 0.2 IU/ML
M TB IFN-G BLD-IMP: NEGATIVE
M TB IFN-G CD4+ BCKGRND COR BLD-ACNC: 9.8 IU/ML
MITOGEN IGNF BCKGRD COR BLD-ACNC: -0.01 IU/ML
MITOGEN IGNF BCKGRD COR BLD-ACNC: -0.01 IU/ML

## 2022-03-20 ENCOUNTER — LAB REQUISITION (OUTPATIENT)
Dept: LAB | Facility: CLINIC | Age: 77
End: 2022-03-20
Payer: COMMERCIAL

## 2022-03-20 DIAGNOSIS — E83.42 HYPOMAGNESEMIA: ICD-10-CM

## 2022-03-20 DIAGNOSIS — N18.9 CHRONIC KIDNEY DISEASE, UNSPECIFIED: ICD-10-CM

## 2022-03-20 DIAGNOSIS — D64.9 ANEMIA, UNSPECIFIED: ICD-10-CM

## 2022-03-21 ENCOUNTER — TRANSITIONAL CARE UNIT VISIT (OUTPATIENT)
Dept: GERIATRICS | Facility: CLINIC | Age: 77
End: 2022-03-21
Payer: COMMERCIAL

## 2022-03-21 VITALS
OXYGEN SATURATION: 99 % | BODY MASS INDEX: 32.17 KG/M2 | WEIGHT: 188.4 LBS | HEART RATE: 74 BPM | DIASTOLIC BLOOD PRESSURE: 81 MMHG | SYSTOLIC BLOOD PRESSURE: 132 MMHG | TEMPERATURE: 97.7 F | RESPIRATION RATE: 18 BRPM | HEIGHT: 64 IN

## 2022-03-21 DIAGNOSIS — F10.21 ALCOHOL DEPENDENCE IN REMISSION (H): Primary | ICD-10-CM

## 2022-03-21 DIAGNOSIS — I35.0 AORTIC VALVE STENOSIS, ETIOLOGY OF CARDIAC VALVE DISEASE UNSPECIFIED: ICD-10-CM

## 2022-03-21 DIAGNOSIS — R60.9 EDEMA, UNSPECIFIED TYPE: ICD-10-CM

## 2022-03-21 DIAGNOSIS — R53.81 PHYSICAL DECONDITIONING: ICD-10-CM

## 2022-03-21 DIAGNOSIS — N18.30 STAGE 3 CHRONIC KIDNEY DISEASE, UNSPECIFIED WHETHER STAGE 3A OR 3B CKD (H): ICD-10-CM

## 2022-03-21 DIAGNOSIS — I50.30 HEART FAILURE WITH PRESERVED EJECTION FRACTION, NYHA CLASS I (H): ICD-10-CM

## 2022-03-21 DIAGNOSIS — J44.9: ICD-10-CM

## 2022-03-21 DIAGNOSIS — D64.9 ANEMIA, UNSPECIFIED TYPE: ICD-10-CM

## 2022-03-21 LAB
ANION GAP SERPL CALCULATED.3IONS-SCNC: 5 MMOL/L (ref 3–14)
BUN SERPL-MCNC: 8 MG/DL (ref 7–30)
CALCIUM SERPL-MCNC: 8.3 MG/DL (ref 8.5–10.1)
CHLORIDE BLD-SCNC: 104 MMOL/L (ref 94–109)
CO2 SERPL-SCNC: 31 MMOL/L (ref 20–32)
CREAT SERPL-MCNC: 1.19 MG/DL (ref 0.52–1.04)
ERYTHROCYTE [DISTWIDTH] IN BLOOD BY AUTOMATED COUNT: 16.1 % (ref 10–15)
GFR SERPL CREATININE-BSD FRML MDRD: 47 ML/MIN/1.73M2
GLUCOSE BLD-MCNC: 59 MG/DL (ref 70–99)
HCT VFR BLD AUTO: 27.8 % (ref 35–47)
HGB BLD-MCNC: 8.3 G/DL (ref 11.7–15.7)
MAGNESIUM SERPL-MCNC: 1.8 MG/DL (ref 1.6–2.3)
MCH RBC QN AUTO: 28.8 PG (ref 26.5–33)
MCHC RBC AUTO-ENTMCNC: 29.9 G/DL (ref 31.5–36.5)
MCV RBC AUTO: 97 FL (ref 78–100)
PLATELET # BLD AUTO: 338 10E3/UL (ref 150–450)
POTASSIUM BLD-SCNC: 3.5 MMOL/L (ref 3.4–5.3)
RBC # BLD AUTO: 2.88 10E6/UL (ref 3.8–5.2)
SODIUM SERPL-SCNC: 140 MMOL/L (ref 133–144)
WBC # BLD AUTO: 5.9 10E3/UL (ref 4–11)

## 2022-03-21 PROCEDURE — 36415 COLL VENOUS BLD VENIPUNCTURE: CPT | Performed by: NURSE PRACTITIONER

## 2022-03-21 PROCEDURE — P9604 ONE-WAY ALLOW PRORATED TRIP: HCPCS | Performed by: NURSE PRACTITIONER

## 2022-03-21 PROCEDURE — 80048 BASIC METABOLIC PNL TOTAL CA: CPT | Performed by: NURSE PRACTITIONER

## 2022-03-21 PROCEDURE — 83735 ASSAY OF MAGNESIUM: CPT | Performed by: NURSE PRACTITIONER

## 2022-03-21 PROCEDURE — 99207 PR CDG-CODE INCORRECT PER BILLING BASED ON TIME: CPT | Performed by: NURSE PRACTITIONER

## 2022-03-21 PROCEDURE — 99309 SBSQ NF CARE MODERATE MDM 30: CPT | Performed by: NURSE PRACTITIONER

## 2022-03-21 PROCEDURE — 85027 COMPLETE CBC AUTOMATED: CPT | Performed by: NURSE PRACTITIONER

## 2022-03-21 RX ORDER — IPRATROPIUM BROMIDE AND ALBUTEROL SULFATE 2.5; .5 MG/3ML; MG/3ML
1 SOLUTION RESPIRATORY (INHALATION) 2 TIMES DAILY
COMMUNITY
End: 2022-07-15

## 2022-03-21 NOTE — PROGRESS NOTES
[place] [facility]    [place] Medical Record Number:  [id number]  Place of Service where encounter took place:  [facility] ([facility]) [[id number]]  Chief Complaint   Patient presents with     RECHECK       HPI:    Kavitha Headley  is a 76 year old (1945), who is being seen today for an episodic care visit.  HPI information obtained from: facility chart records, facility staff, patient report and [facility] chart review.     PMH: alcohol use disorder, chronic bronchitis, chronic anemia, HFpEF, aortic stenosis, depression and anxiety    Admitted to [facility] 3/10-3/16/22 due to fall, elevated CK and alcohol withdrawal. Patient reported had fall 2 days PTA, had to crawl on floor to call EMS. Was treated with IV fluids. Noted to be in acute alcohol withdrawal on admission with tachycardia and tremulousness, though did not require as needed medications for treatment of alcohol withdrawal. She is in agreement to go to inpatient treatment but is discharged to TCU for further for rehab prior to discharge to inpatient rehab.     [place] [facility] [place].       Today's concern is:    During exam, patient seen sitting in bed. Reports doing well. Has been participating in therapy. Ambulates w/o assistive device. States independent with activity and ADLs, wanting to discharge from TCU as soon as able. SW following for discharge planning. Admits to good appetite. Sleeping well at night. Endorses increase in BLE edema since hospital stay, does not like wearing compression stockings. Denies chest pain, SOB, headache, syncope.      Past Medical and Surgical History reviewed in Epic today.    MEDICATIONS:    Current Outpatient Medications   Medication Sig Dispense Refill     acamprosate (CAMPRAL) 333 MG EC tablet Take 2 tablets (666 mg) by mouth 3 times daily 180 tablet 1     acetaminophen (TYLENOL) 500 MG tablet Take 1,000 mg by mouth 2 times daily as needed for pain       albuterol (PROAIR  HFA/PROVENTIL HFA/VENTOLIN HFA) 108 (90 Base) MCG/ACT inhaler Inhale 2 puffs into the lungs every 6 hours as needed for wheezing 18 g 0     aspirin 81 MG EC tablet Take 81 mg by mouth daily       atenolol (TENORMIN) 25 MG tablet Take 1 tablet (25 mg) by mouth daily 100 tablet 3     ciprofloxacin (CIPRO) 500 MG tablet Take 1 tablet (500 mg) by mouth every 12 hours for 5 days       citalopram (CELEXA) 20 MG tablet Take 1 tablet (20 mg) by mouth daily 90 tablet 1     diclofenac (VOLTAREN) 50 MG EC tablet Take 1 tablet (50 mg) by mouth 2 times daily as needed for moderate pain 90 tablet 1     folic acid (FOLVITE) 1 MG tablet Take 1 mg by mouth daily       furosemide (LASIX) 20 MG tablet Take 30 mg daily x 3 days and return to 20 mg/d 100 tablet 4     gabapentin (NEURONTIN) 300 MG capsule Take 1 capsule (300 mg) by mouth 3 times daily 270 capsule 1     hydrOXYzine (ATARAX) 25 MG tablet Take 1-2 tablets (25-50 mg) by mouth every 6 hours as needed for anxiety or other (sleep) 60 tablet 0     ipratropium - albuterol 0.5 mg/2.5 mg/3 mL (DUONEB) 0.5-2.5 (3) MG/3ML neb solution NEBULIZE THE CONTENTS OF ONE VIAL FOUR TIMES A DAY 90 mL 0     methocarbamol (ROBAXIN) 500 MG tablet Take 1 tablet (500 mg) by mouth 2 times daily 180 tablet 1     mirtazapine (REMERON) 15 MG tablet Take 1 tablet (15 mg) by mouth At Bedtime 30 tablet 1     MISC NATURAL PRODUCTS PO Take 1 capsule by mouth daily B-glucan capsule (Patient not taking: Reported on 3/18/2022)       MISC NATURAL PRODUCTS PO Take 1 tablet by mouth daily Keto - Ketogenesis tablets (Patient not taking: Reported on 3/18/2022)       MISC NATURAL PRODUCTS PO Take 1 tablet by mouth daily Exipure Homeopathic (Patient not taking: Reported on 3/18/2022)       MISC NATURAL PRODUCTS PO Take 1 tablet by mouth daily L-Tryptophan 1000 mg daily (Patient not taking: Reported on 3/18/2022)       mometasone-formoterol (DULERA) 200-5 MCG/ACT inhaler Inhale 2 puffs into the lungs 2 times daily 13  "g 0     Multiple Vitamins-Minerals (CENTRUM SILVER) per tablet Take 1 tablet by mouth daily       polyethylene glycol (MIRALAX) 17 GM/Dose powder Take 17 g by mouth 2 times daily as needed for constipation       potassium chloride ER (KLOR-CON M) 10 MEQ CR tablet Take 2 tablets (20 mEq) by mouth daily for 3 days 6 tablet 0     pravastatin (PRAVACHOL) 20 MG tablet Take 1 tablet (20 mg) by mouth daily 90 tablet 1     QUEtiapine (SEROQUEL XR) 150 MG TB24 24 hr tablet Take 2 tablets (300 mg) by mouth At Bedtime 60 tablet 1     QUEtiapine (SEROQUEL) 50 MG tablet Take 1 tablet (50 mg) by mouth nightly as needed (Anxiety, insomnia, hallucinations) 30 tablet 8     senna-docusate (SENOKOT-S/PERICOLACE) 8.6-50 MG tablet Take 1 tablet by mouth 2 times daily       traZODone (DESYREL) 100 MG tablet TAKE 1 TABLET NIGHTLY AS NEEDED FOR SLEEP 30 tablet 1     valACYclovir (VALTREX) 1000 mg tablet Take 2,000 mg by mouth as needed X1 ASAP at symptom onset of cold sore           REVIEW OF SYSTEMS:  4 point ROS including Respiratory, CV, GI and , other than that noted in the HPI,  is negative      Objective:  /81   Pulse 74   Temp 97.7  F (36.5  C)   Resp 18   Ht 1.626 m (5' 4\")   Wt 85.5 kg (188 lb 6.4 oz)   SpO2 99%   BMI 32.34 kg/m    Exam:  GENERAL APPEARANCE:  Alert, in no distress, appears healthy, oriented, cooperative  ENT:  Mouth and posterior oropharynx normal, moist mucous membranes, normal hearing acuity  EYES:  EOM, conjunctivae, lids, pupils and irises normal, PERRL  RESP:  respiratory effort and palpation of chest normal, lungs clear to auscultation , no respiratory distress  CV:  Palpation and auscultation of heart done , regular rate and rhythm, + systolic murmur, +1 BLE edema  ABDOMEN:  normal bowel sounds, soft, nontender, no guarding or rebound  M/S:   Gait and station normal, ambulates w/o assistive device.  SKIN:  Inspection of skin and subcutaneous tissue baseline, Palpation of skin and subcutaneous " tissue baseline  NEURO:   Cranial nerves 2-12 are normal tested and grossly at patient's baseline, Examination of sensation by touch normal  PSYCH:  oriented X 3, affect and mood normal      Labs:   Recent labs in EPIC reviewed by me today.  and   Most Recent 3 CBC's:  Recent Labs   Lab Test 03/21/22  0545 03/14/22  0640 03/12/22  0801   WBC 5.9 6.9 7.5   HGB 8.3* 9.7* 8.8*   MCV 97 92 88    332 278     Most Recent 3 BMP's:  Recent Labs   Lab Test 03/21/22  0545 03/15/22  1514 03/14/22  0640    139 140   POTASSIUM 3.5 3.9 4.3   CHLORIDE 104 105 109   CO2 31 31 27   BUN 8 15 12   CR 1.19* 1.14* 0.99   ANIONGAP 5 3 4   BIRGIT 8.3* 8.8 8.6   GLC 59* 89 94         ASSESSMENT/PLAN:    (F10.21) Alcohol dependence in remission (H)  (primary encounter diagnosis)  (R53.81) Physical deconditioning  Comment: Chronic alcohol abuse resulting in recent physical deconditioning. PTA lives alone in apartment. Ambulates independently without assistive device.   Plan:   - Continue campral  - Encourage active participation in therapy session to increase strength and promote independence in activities and ADLs.   - SW following for discharge planning. Goal is to discharge to inpatient treatment for alcoholism; patient questioning if she can discharge home first. Reviewed patient concerns with SW, plan to wait to see if patient can discharge to inpatient treatment first and will follow-up with patient regarding discharge planning.  - Patient verbalizes understanding and agrees with treatment plan.     (I50.30) Heart failure with preserved ejection fraction, NYHA class I (H)  (I35.0) Aortic valve stenosis, etiology of cardiac valve disease unspecified  (R60.9) Edema, unspecified type  Comment: Chronic CHF. Echo 3/12/22 found EF 60-65% with severe aortic stenosis. BLE edema.   Plan:   - Continue lasix 20mg every day   - Discussed addition of OT eval for compression stockings, patient declined  - Monitor weights, BP/HR  - Patient  to follow-up with Cardiologist to discuss potential TAVR  - Patient verbalizes understanding and agrees with treatment plan.     (N18.30) Stage 3 chronic kidney disease, unspecified whether stage 3a or 3b CKD (H)  Comment: Creat improving, slightly elevated from baseline due to use of IV fluids. Creat baseline 0.9-1.0.   Plan:   - Check BMP 3/24 dx CKD  - Avoid nephrotoxic medications  - Patient verbalizes understanding and agrees with treatment plan.     (J44.9) Chronic obstructive pulmonary disease with bronchospasm (H)  Comment: Chronic, O2 sats stable on RA  Plan:   - Decrease duoneb to BID and BID PRN per patient request; was taking nebulizer treatments BID PTA  - Continue dulera BID, albuterol PRN  - Monitor O2 sats qshift and with therapy, keep O2 > 90%.   - Patient verbalizes understanding and agrees with treatment plan.     (D64.9) Anemia, unspecified type  Comment: Baseline Hgb 10-11. Hgb dropped to 9.7 during hospital stay likely r/t hemodilution to IV fluids. Last Hgb 8.3 on 3/21. No sx of active bleeding.  Plan:   - Recheck Hgb on 3/24   - Check iron level, ferritin, iron saturation on 3/24  - Patient verbalizes understanding and agrees with treatment plan.           Total time spent with patient visit at the skilled nursing facility was 35 mins including patient visit and review of past records. Greater than 50% of total time (21 minutes) spent with counseling patient regarding treatment plan, medication management, follow-up labs, and follow-up appointments. Patient verbalizes understanding and agrees with treatment plan. Coordinating care with  regarding discharge planning.     Electronically signed by:  ARISTEO Varghese CNP

## 2022-03-23 ENCOUNTER — LAB REQUISITION (OUTPATIENT)
Dept: LAB | Facility: CLINIC | Age: 77
End: 2022-03-23
Payer: COMMERCIAL

## 2022-03-23 ENCOUNTER — PATIENT OUTREACH (OUTPATIENT)
Dept: CARE COORDINATION | Facility: CLINIC | Age: 77
End: 2022-03-23
Payer: COMMERCIAL

## 2022-03-23 DIAGNOSIS — D64.9 ANEMIA, UNSPECIFIED: ICD-10-CM

## 2022-03-23 DIAGNOSIS — N18.9 CHRONIC KIDNEY DISEASE, UNSPECIFIED: ICD-10-CM

## 2022-03-23 NOTE — PROGRESS NOTES
Clinic Care Coordination Contact  Care Conference    S-(situation): Patient currently at [facility] TCU for rehabilitation.    B-(background): Patient was inpatient at [facility] 3/10/22-3/16/22 due to s/p mechanical fall, elevated CK, ongoing alcohol use disorder, alcohol withdrawal symptoms, dyspnea from recent episode of bronchitis, hyponatremia, hypomagnesemia, mild leukocytosis.  Patient was discharged to [facility]CU on 3/16/22 for rehabilitation.    A-(assessment): Clinical Product Navigator attended TCU Care Conference via phone with patient, TCU interdisciplinary team and daughter.  [place] informed SW that patient will need a Rule 25 assessment to have inpatient treatment.  Therapies: ADLs independently, working on cooking, cleaning, laundry and balance and medication management.  If goes to treatment center, has to be independent in medication management.  41/56 GARZA, high fall risk.  Working on increasing balance.  Discharge; waiting to hear about treatment, hoping to go straight there, but will likely discharge home 3/28/22 or 3/29/22.  Daughter verbalized concern of patient discharging directly home and not directly to inpatient treatment due to alcohol dependence.    Writer provided resource of [facility] [phone] if patient is not able to admit to inpatient treatment following TCU discharge.    R-(recommendations/plan): TCU will continue to work with patient on balance, IADLs and admission to inpatient treatment program.  TCU will explore Transition Clinic if patient is unable to admit directly to inpatient treatment program.    RN CPN will place Primary Care-Care Coordination referral following TCU discharge.    Melissa Behl BSN, RN, PHN, [facility]  Clinical Product Navigator  [phone]

## 2022-03-24 ENCOUNTER — TRANSITIONAL CARE UNIT VISIT (OUTPATIENT)
Dept: GERIATRICS | Facility: CLINIC | Age: 77
End: 2022-03-24
Payer: COMMERCIAL

## 2022-03-24 VITALS
WEIGHT: 184.2 LBS | RESPIRATION RATE: 18 BRPM | HEIGHT: 64 IN | DIASTOLIC BLOOD PRESSURE: 79 MMHG | HEART RATE: 71 BPM | BODY MASS INDEX: 31.45 KG/M2 | TEMPERATURE: 97.3 F | OXYGEN SATURATION: 97 % | SYSTOLIC BLOOD PRESSURE: 120 MMHG

## 2022-03-24 DIAGNOSIS — D64.9 ANEMIA, UNSPECIFIED TYPE: ICD-10-CM

## 2022-03-24 DIAGNOSIS — N18.30 STAGE 3 CHRONIC KIDNEY DISEASE, UNSPECIFIED WHETHER STAGE 3A OR 3B CKD (H): ICD-10-CM

## 2022-03-24 DIAGNOSIS — G47.00 INSOMNIA, UNSPECIFIED TYPE: ICD-10-CM

## 2022-03-24 DIAGNOSIS — I50.30 HEART FAILURE WITH PRESERVED EJECTION FRACTION, NYHA CLASS I (H): ICD-10-CM

## 2022-03-24 DIAGNOSIS — I35.0 AORTIC VALVE STENOSIS, ETIOLOGY OF CARDIAC VALVE DISEASE UNSPECIFIED: ICD-10-CM

## 2022-03-24 DIAGNOSIS — F33.0 MILD RECURRENT MAJOR DEPRESSION (H): ICD-10-CM

## 2022-03-24 DIAGNOSIS — F10.21 ALCOHOL DEPENDENCE IN REMISSION (H): ICD-10-CM
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
## 2022-03-24 DIAGNOSIS — R60.0 BILATERAL LOWER EXTREMITY EDEMA: ICD-10-CM

## 2022-03-24 DIAGNOSIS — J44.9: ICD-10-CM

## 2022-03-24 DIAGNOSIS — R53.81 PHYSICAL DECONDITIONING: ICD-10-CM

## 2022-03-24 LAB
ANION GAP SERPL CALCULATED.3IONS-SCNC: 5 MMOL/L (ref 3–14)
BUN SERPL-MCNC: 8 MG/DL (ref 7–30)
CALCIUM SERPL-MCNC: 8.9 MG/DL (ref 8.5–10.1)
CHLORIDE BLD-SCNC: 105 MMOL/L (ref 94–109)
CO2 SERPL-SCNC: 29 MMOL/L (ref 20–32)
CREAT SERPL-MCNC: 1.05 MG/DL (ref 0.52–1.04)
FERRITIN SERPL-MCNC: 29 NG/ML (ref 8–252)
GFR SERPL CREATININE-BSD FRML MDRD: 54 ML/MIN/1.73M2
GLUCOSE BLD-MCNC: 75 MG/DL (ref 70–99)
HGB BLD-MCNC: 10 G/DL (ref 11.7–15.7)
IRON SATN MFR SERPL: 15 % (ref 15–46)
IRON SERPL-MCNC: 40 UG/DL (ref 35–180)
POTASSIUM BLD-SCNC: 3.7 MMOL/L (ref 3.4–5.3)
SODIUM SERPL-SCNC: 139 MMOL/L (ref 133–144)
TIBC SERPL-MCNC: 274 UG/DL (ref 240–430)

## 2022-03-24 PROCEDURE — 80048 BASIC METABOLIC PNL TOTAL CA: CPT | Performed by: NURSE PRACTITIONER

## 2022-03-24 PROCEDURE — P9604 ONE-WAY ALLOW PRORATED TRIP: HCPCS | Performed by: NURSE PRACTITIONER

## 2022-03-24 PROCEDURE — 82728 ASSAY OF FERRITIN: CPT | Performed by: NURSE PRACTITIONER

## 2022-03-24 PROCEDURE — 99316 NF DSCHRG MGMT 30 MIN+: CPT | Performed by: NURSE PRACTITIONER

## 2022-03-24 PROCEDURE — 83550 IRON BINDING TEST: CPT | Performed by: NURSE PRACTITIONER

## 2022-03-24 PROCEDURE — 36415 COLL VENOUS BLD VENIPUNCTURE: CPT | Performed by: NURSE PRACTITIONER

## 2022-03-24 PROCEDURE — 85018 HEMOGLOBIN: CPT | Performed by: NURSE PRACTITIONER

## 2022-03-24 RX ORDER — FUROSEMIDE 20 MG
20 TABLET ORAL DAILY
COMMUNITY
End: 2022-03-29

## 2022-03-24 RX ORDER — HYDROXYZINE HYDROCHLORIDE 25 MG/1
25 TABLET, FILM COATED ORAL EVERY 6 HOURS PRN
COMMUNITY
End: 2022-07-15

## 2022-03-24 NOTE — LETTER
3/24/2022        RE: Kavitha Headley  [address] [facility] [address]        No notes on file      Sincerely,        ARISTEO Varghese CNP

## 2022-03-24 NOTE — PROGRESS NOTES
[place] [facility] DISCHARGE SUMMARY    PATIENT'S NAME: Kavitha Headley  YOB: 1945  MEDICAL RECORD NUMBER:  [id number]  Place of Service where encounter took place:  [facility] ([facility]) [[id number]]    PRIMARY CARE PROVIDER AND CLINIC RESPONSIBLE AFTER TRANSFER:   Nayeli Castorena PA-C, [address] [facility] [place] [facility] [address]    [facility]     Transferring providers: ARISTEO Varghese CNP; Kin Lombardi MD  Recent Hospitalization/ED:  [facility] stay 3/10/22 to 3/16/22.  Date of SNF Admission: March 16, 2022  Date of SNF (anticipated) Discharge: March 26, 2022  Discharged to: previous independent home  Cognitive Scores: N/A  Physical Function: Ambulating 200-350 ft without assistive device  DME: N/A    CODE STATUS/ADVANCE DIRECTIVES DISCUSSION:  Full Code   ALLERGIES: Bactrim [sulfamethoxazole w/trimethoprim], Codeine, and Morphine      DISCHARGE DIAGNOSIS/NURSING FACILITY COURSE:     PMH: alcohol use disorder, chronic bronchitis, chronic anemia, HFpEF, aortic stenosis, depression and anxiety     Admitted to [facility] 3/10-3/16/22 due to fall, elevated CK and alcohol withdrawal. Patient reported had fall 2 days PTA, had to crawl on floor to call EMS. Was treated with IV fluids. Noted to be in acute alcohol withdrawal on admission with tachycardia and tremulousness, though did not require as needed medications for treatment of alcohol withdrawal. She is in agreement to go to inpatient treatment but is discharged to TCU for further for rehab prior to discharge to inpatient rehab.      [place] [facility] [place].       Fall, subsequent encounter  Alcohol dependence in remission (H)  Physical deconditioning  Recent hospitalization associated with fall secondary to alcohol abuse and physical deconditioning. PTA lives alone. Ambulates independently without assistive device. Patient states goal was to find inpatient  treatment center for alcoholism; however, having difficulty finding a treatment center that accepts Medicare insurance. Has a list of treatment center across the country that she plans to call. Has gone to AA meetings in the past and found it helpful. If unable to find treatment center, plans to discharge home first. Reports daughter has been a support for her.   [facility] to follow, PT, OT, ANISA, RN, SW.    Heart failure with preserved ejection fraction, NYHA class I (H)  Aortic valve stenosis, etiology of cardiac valve disease unspecified  Bilateral lower extremity edema  Chronic CHF. Echo 3/12/22 found EF 60-65% with severe aortic stenosis. BLE edema r/t IV fluids during hospital stay, aortic valve stenosis, and mild hypoalbuminemia. Recently increased lasix to 40mg every day x 3 days; then take lasix 20mg every day. Elevate legs during the day and at night; declined compression stockings/OT evaluation for edema therapy. Weight trending down, TCU admission weight 192lbs on 3/16, last weight 184lbs on 3/24. Also taking atenolol, ASA, pravastatin. Denies chest pain, SOB, headache, syncope.  Patient to follow-up with Cardiologist to discuss potential TAVR    Stage 3 chronic kidney disease, unspecified whether stage 3a or 3b CKD (H)  Creat improving, slightly elevated from baseline due to use of IV fluids. Creat baseline 0.9-1.0.     Mild recurrent major depression (H)  Insomnia, unspecified type  Continue celexa, remeron, seroquel, trazodone.    Chronic obstructive pulmonary disease with bronchospasm (H)  Chronic, O2 sats stable on RA. Continue dulera BID, albuterol inhaler PRN, duoneb BID and PRN. Denies coughing or SOB; endorses mild wheezing this morning following use of inhalers and since has resolved.     Anemia, unspecified type  Baseline Hgb 10-11. Hgb dropped to 9.7 during hospital stay likely r/t hemodilution to IV fluids. Last Hgb 10 on 3/24. Continue folic acid supplement. Folic acid and vitamin B12  level WNL. Ferritin WNL. No active sx of bleeding.        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    She has no past medical history of Complication of anesthesia, Difficult intubation, Malignant hyperthermia, PONV (postoperative nausea and vomiting), or Spinal headache.    Discharge Medications:    Current Outpatient Medications   Medication Sig Dispense Refill     acamprosate (CAMPRAL) 333 MG EC tablet Take 2 tablets (666 mg) by mouth 3 times daily 180 tablet 1     acetaminophen (TYLENOL) 500 MG tablet Take 1,000 mg by mouth 2 times daily as needed for pain       albuterol (PROAIR HFA/PROVENTIL HFA/VENTOLIN HFA) 108 (90 Base) MCG/ACT inhaler Inhale 2 puffs into the lungs every 6 hours as needed for wheezing 18 g 0     aspirin 81 MG EC tablet Take 81 mg by mouth daily       atenolol (TENORMIN) 25 MG tablet Take 1 tablet (25 mg) by mouth daily 100 tablet 3     citalopram (CELEXA) 20 MG tablet Take 1 tablet (20 mg) by mouth daily 90 tablet 1     diclofenac (VOLTAREN) 50 MG EC tablet Take 1 tablet (50 mg) by mouth 2 times daily as needed for moderate pain 90 tablet 1     folic acid  (FOLVITE) 1 MG tablet Take 1 mg by mouth daily       furosemide (LASIX) 20 MG tablet Take 20 mg by mouth daily       gabapentin (NEURONTIN) 300 MG capsule Take 1 capsule (300 mg) by mouth 3 times daily 270 capsule 1     hydrOXYzine (ATARAX) 25 MG tablet Take 25 mg by mouth every 6 hours as needed for itching       ipratropium - albuterol 0.5 mg/2.5 mg/3 mL (DUONEB) 0.5-2.5 (3) MG/3ML neb solution Take 1 vial by nebulization 2 times daily And BID PRN       methocarbamol (ROBAXIN) 500 MG tablet Take 1 tablet (500 mg) by mouth 2 times daily 180 tablet 1     mirtazapine (REMERON) 15 MG tablet Take 1 tablet (15 mg) by mouth At Bedtime 30 tablet 1     mometasone-formoterol (DULERA) 200-5 MCG/ACT inhaler Inhale 2 puffs into the lungs 2 times daily 13 g 0     Multiple Vitamins-Minerals (CENTRUM SILVER) per tablet Take 1 tablet by mouth daily       polyethylene glycol (MIRALAX) 17 GM/Dose powder Take 17 g by mouth 2 times daily as needed for constipation       pravastatin (PRAVACHOL) 20 MG tablet Take 1 tablet (20 mg) by mouth daily 90 tablet 1     QUEtiapine (SEROQUEL XR) 150 MG TB24 24 hr tablet Take 2 tablets (300 mg) by mouth At Bedtime 60 tablet 1     QUEtiapine (SEROQUEL) 50 MG tablet Take 1 tablet (50 mg) by mouth nightly as needed (Anxiety, insomnia, hallucinations) 30 tablet 8     senna-docusate (SENOKOT-S/PERICOLACE) 8.6-50 MG tablet Take 1 tablet by mouth 2 times daily       traZODone (DESYREL) 100 MG tablet TAKE 1 TABLET NIGHTLY AS NEEDED FOR SLEEP 30 tablet 1     valACYclovir (VALTREX) 1000 mg tablet Take 2,000 mg by mouth as needed X1 ASAP at symptom onset of cold sore       MISC NATURAL PRODUCTS PO Take 1 capsule by mouth daily B-glucan capsule        MISC NATURAL PRODUCTS PO Take 1 tablet by mouth daily Keto - Ketogenesis tablets        MISC NATURAL PRODUCTS PO Take 1 tablet by mouth daily Exipure Homeopathic        MISC NATURAL PRODUCTS PO Take 1 tablet by mouth daily L-Tryptophan 1000 mg daily     "      Medication Changes/Rationale:     Recently increased lasix to 40mg every day x 3 days; then continue lasix 20mg every day dx BLE edema, CHF    Decreased duoneb to BID and BID PRN    Controlled medications sent with patient:   not applicable/none         ROS:   10 point ROS of systems including Constitutional, Eyes, Respiratory, Cardiovascular, Gastroenterology, Genitourinary, Integumentary, Musculoskeletal, Psychiatric were all negative except for pertinent positives noted in my HPI.      Physical Exam:   Vitals: /79   Pulse 71   Temp 97.3  F (36.3  C)   Resp 18   Ht 1.626 m (5' 4\")   Wt 83.6 kg (184 lb 3.2 oz)   SpO2 97%   BMI 31.62 kg/m    BMI= Body mass index is 31.62 kg/m .  GENERAL APPEARANCE:  Alert, in no distress, appears healthy, oriented, cooperative  ENT:  Mouth and posterior oropharynx normal, moist mucous membranes, normal hearing acuity  EYES:  EOM, conjunctivae, lids, pupils and irises normal, PERRL  RESP:  respiratory effort and palpation of chest normal, lungs clear to auscultation , no respiratory distress  CV:  Palpation and auscultation of heart done , regular rate and rhythm, + systolic murmur, +1-2 BLE edema  ABDOMEN:  normal bowel sounds, soft, nontender, no guarding or rebound  M/S:   Gait and station normal, ambulates w/o assistive device.  SKIN:  Inspection of skin and subcutaneous tissue baseline, Palpation of skin and subcutaneous tissue baseline  NEURO:   Cranial nerves 2-12 are normal tested and grossly at patient's baseline, Examination of sensation by touch normal  PSYCH:  oriented X 3, affect and mood normal    Wt Readings from Last 4 Encounters:   03/24/22 83.6 kg (184 lb 3.2 oz)   03/21/22 85.5 kg (188 lb 6.4 oz)   03/18/22 86.4 kg (190 lb 6.4 oz)   03/10/22 81.6 kg (180 lb)       SNF labs: Labs done in SNF are in [place] EPIC. Please refer to them using EPIC/Care Everywhere., Recent labs in EPIC reviewed by me today.  and   Most Recent 3 CBC's:Recent Labs   Lab " Test 03/24/22  0721 03/21/22  0545 03/14/22  0640 03/12/22  0801   WBC  --  5.9 6.9 7.5   HGB 10.0* 8.3* 9.7* 8.8*   MCV  --  97 92 88   PLT  --  338 332 278     Most Recent 3 BMP's:Recent Labs   Lab Test 03/24/22  0721 03/21/22  0545 03/15/22  1514    140 139   POTASSIUM 3.7 3.5 3.9   CHLORIDE 105 104 105   CO2 29 31 31   BUN 8 8 15   CR 1.05* 1.19* 1.14*   ANIONGAP 5 5 3   BIRGIT 8.9 8.3* 8.8   GLC 75 59* 89     Ferritin   Date Value Ref Range Status   03/24/2022 29 8 - 252 ng/mL Final   03/03/2021 16 8 - 252 ng/mL Final     Iron   Date Value Ref Range Status   03/24/2022 40 35 - 180 ug/dL Final   06/14/2017 123 35 - 180 ug/dL Final     Iron Binding Cap   Date Value Ref Range Status   06/14/2017 126 (L) 240 - 430 ug/dL Final     Iron Binding Capacity   Date Value Ref Range Status   03/24/2022 274 240 - 430 ug/dL Final       TSH   Date Value Ref Range Status   03/03/2021 1.05 0.40 - 4.00 mU/L Final     Liver Function Studies - Recent Labs   Lab Test 03/10/22  1454   PROTTOTAL 6.8   ALBUMIN 3.0*   BILITOTAL 0.7   ALKPHOS 111   AST 41   ALT 23         DISCHARGE PLAN:    Follow up labs: No labs orders/due      Medical Follow Up:      Follow up with primary care provider in 1-2 weeks   Patient to follow-up with Cardiologist to discuss potential TAVR      Discharge Services:   Plan to go to chemical dependency treatment in [place] on 4/12/22      Discharge Instructions Verbalized to Patient at Discharge:     Notify PCP if you have a fever greater than 100.5 degrees.         TOTAL DISCHARGE TIME:   Greater than 30 minutes    Electronically signed by:  ARISTEO Varghese CNP

## 2022-03-25 NOTE — PROGRESS NOTES
[facility] Discharge Orders    Name: Kavitha Headley  : 1945  Planned Discharge Date: 3/26/22  Discharged to: previous independent home      MEDICAL FOLLOW UP  Follow up with PCP in 1-2 weeks       FUTURE LABS: No labs orders/due      ORDER CHANGES:    Recently increased lasix to 40mg every day x 3 days; then continue lasix 20mg every day dx BLE edema, CHF    Decreased duoneb to BID and BID PRN    DISCHARGE MEDICATIONS:  The patient s pharmacy is authorized to dispense a 30-day supply of medications. Refill requests should be directed to the primary provider, Nayeli Castorena.   No narcotics are prescribed at time of discharge.   Current Outpatient Medications   Medication Sig Dispense Refill     acamprosate (CAMPRAL) 333 MG EC tablet Take 2 tablets (666 mg) by mouth 3 times daily 180 tablet 1     acetaminophen (TYLENOL) 500 MG tablet Take 1,000 mg by mouth 2 times daily as needed for pain       albuterol (PROAIR HFA/PROVENTIL HFA/VENTOLIN HFA) 108 (90 Base) MCG/ACT inhaler Inhale 2 puffs into the lungs every 6 hours as needed for wheezing 18 g 0     aspirin 81 MG EC tablet Take 81 mg by mouth daily       atenolol (TENORMIN) 25 MG tablet Take 1 tablet (25 mg) by mouth daily 100 tablet 3     citalopram (CELEXA) 20 MG tablet Take 1 tablet (20 mg) by mouth daily 90 tablet 1     diclofenac (VOLTAREN) 50 MG EC tablet Take 1 tablet (50 mg) by mouth 2 times daily as needed for moderate pain 90 tablet 1     folic acid (FOLVITE) 1 MG tablet Take 1 mg by mouth daily       furosemide (LASIX) 20 MG tablet Take 20 mg by mouth daily       gabapentin (NEURONTIN) 300 MG capsule Take 1 capsule (300 mg) by mouth 3 times daily 270 capsule 1     hydrOXYzine (ATARAX) 25 MG tablet Take 25 mg by mouth every 6 hours as needed for itching       ipratropium - albuterol 0.5 mg/2.5 mg/3 mL (DUONEB) 0.5-2.5 (3) MG/3ML neb solution Take 1 vial by nebulization 2 times daily And BID PRN       methocarbamol (ROBAXIN)  500 MG tablet Take 1 tablet (500 mg) by mouth 2 times daily 180 tablet 1     mirtazapine (REMERON) 15 MG tablet Take 1 tablet (15 mg) by mouth At Bedtime 30 tablet 1     mometasone-formoterol (DULERA) 200-5 MCG/ACT inhaler Inhale 2 puffs into the lungs 2 times daily 13 g 0     Multiple Vitamins-Minerals (CENTRUM SILVER) per tablet Take 1 tablet by mouth daily       polyethylene glycol (MIRALAX) 17 GM/Dose powder Take 17 g by mouth 2 times daily as needed for constipation       pravastatin (PRAVACHOL) 20 MG tablet Take 1 tablet (20 mg) by mouth daily 90 tablet 1     QUEtiapine (SEROQUEL XR) 150 MG TB24 24 hr tablet Take 2 tablets (300 mg) by mouth At Bedtime 60 tablet 1     QUEtiapine (SEROQUEL) 50 MG tablet Take 1 tablet (50 mg) by mouth nightly as needed (Anxiety, insomnia, hallucinations) 30 tablet 8     senna-docusate (SENOKOT-S/PERICOLACE) 8.6-50 MG tablet Take 1 tablet by mouth 2 times daily       traZODone (DESYREL) 100 MG tablet TAKE 1 TABLET NIGHTLY AS NEEDED FOR SLEEP 30 tablet 1     valACYclovir (VALTREX) 1000 mg tablet Take 2,000 mg by mouth as needed X1 ASAP at symptom onset of cold sore       MISC NATURAL PRODUCTS PO Take 1 capsule by mouth daily B-glucan capsule        MISC NATURAL PRODUCTS PO Take 1 tablet by mouth daily Keto - Ketogenesis tablets        MISC NATURAL PRODUCTS PO Take 1 tablet by mouth daily Exipure Homeopathic        MISC NATURAL PRODUCTS PO Take 1 tablet by mouth daily L-Tryptophan 1000 mg daily          SERVICES:  Plan to go to chemical dependency treatment in [place] on 4/12/22      ADDITIONAL INSTRUCTIONS:  ? Notify PCP if you have a fever greater than 100.5 degrees.         ARISTEO Varghese CNP  This document was electronically signed on March 25, 2022

## 2022-03-28 ENCOUNTER — PATIENT OUTREACH (OUTPATIENT)
Dept: CARE COORDINATION | Facility: CLINIC | Age: 77
End: 2022-03-28
Payer: COMMERCIAL

## 2022-03-28 DIAGNOSIS — F10.21 ALCOHOL USE DISORDER, SEVERE, IN EARLY REMISSION (H): ICD-10-CM

## 2022-03-28 DIAGNOSIS — I50.32 CHRONIC DIASTOLIC (CONGESTIVE) HEART FAILURE (H): Primary | ICD-10-CM

## 2022-03-28 NOTE — PROGRESS NOTES
S-(situation): TCU Discharge    B-(background): Patient was inpatient at [facility] from 3/16/22-3/26/22 due to alcohol use disorder and fall  and was discharged to [facility]CU for rehabilitation.    A-(assessment): Patient was discharged to home on 3/26/22.  Patient has plans to go to [place] chemical dependency inpatient treatment program on 4/12/22.  TCU  attempted to enroll patient in [facility], but had not received a call back.  Ambulatory Care Coordination team to work with patient on Transitions Clinic enrollment until inpatient admission.    R-(recommendations/plan): Care Coordination referral placed.  CPN will monitor for any newly identified care navigation needs in 1 month.    Melissa Behl BSN, RN, PHN, [facility]  RN Clinical Product Navigator  [phone]        
occasionally

## 2022-03-29 ENCOUNTER — PATIENT OUTREACH (OUTPATIENT)
Dept: NURSING | Facility: CLINIC | Age: 77
End: 2022-03-29
Payer: COMMERCIAL

## 2022-03-29 DIAGNOSIS — F10.21 ALCOHOL USE DISORDER, SEVERE, IN EARLY REMISSION (H): ICD-10-CM

## 2022-03-29 DIAGNOSIS — I50.32 CHRONIC DIASTOLIC (CONGESTIVE) HEART FAILURE (H): Primary | ICD-10-CM

## 2022-03-29 DIAGNOSIS — I50.32 CHRONIC DIASTOLIC (CONGESTIVE) HEART FAILURE (H): ICD-10-CM

## 2022-03-29 RX ORDER — FUROSEMIDE 20 MG
20 TABLET ORAL DAILY
Qty: 90 TABLET | Refills: 0 | Status: SHIPPED | OUTPATIENT
Start: 2022-03-29 | End: 2022-06-16

## 2022-03-29 NOTE — LETTER
[facility] CARE COORDINATION    March 29, 2022    Kavitha Headley  [address] [place] [facility] [address]      Dear Linda,    I am a clinic care coordinator who works with Nayeli Castorena PA-C at [facility]. I wanted to thank you for spending the time to talk with me.  Below is a description of clinic care coordination and how I can further assist you.      The clinic care coordination team is made up of a registered nurse,  and community health worker who understand the health care system. The goal of clinic care coordination is to help you manage your health and improve access to the health care system in the most efficient manner. The team can assist you in meeting your health care goals by providing education, coordinating services, strengthening the communication among your providers and supporting you with any resource needs.    Please feel free to contact me at [phone] with any questions or concerns. We are focused on providing you with the highest-quality healthcare experience possible and that all starts with you.     Sincerely,     ONEIL Martinez

## 2022-03-29 NOTE — PROGRESS NOTES
Clinic Care Coordination Contact  [facility]: Post-Discharge Note  SITUATION                                                      Admission:    Admission Date: 03/10/22   Reason for Admission: Mechanical Fall  Discharge:   Discharge Date: 03/16/22  Discharge Diagnosis: Alcohol withdrawal    BACKGROUND                                                      Per hospital discharge summary and inpatient provider notes:    Kavitha Headley is a 76 year old female with past medical history significant for alcohol use disorder, recent hospitalization with upper respiratory infection and bronchitis, chronic anemia secondary to alcoholism, diastolic congestive heart failure, pulmonary hypertension and severe aortic stenosis with history of depression and anxiety who was admitted on March 10 when she presented after a mechanical fall and was also in alcohol withdrawal.  Here are further details regarding her current hoapitalization     Mechanical Fall   Elevated CK, not clinically significant  The patient reports that two days ago she got up to use the bathroom, slipped and fell. She was apparently unable to get up on her own but was able to crawl into the other room to get her phone to call EMS. She does have scattered bruising/scrapes on her knees and elbows. Her CK is mildly elevated to 460. No other apparent injuries      -- Patient looks clinically better , thinks that she is back to baseline cognitively but is still weak and is open to the idea of going to rehab first to get her strength back  --Patient is now off fluids, CKs in normal range  --Patient has been evaluated by therapies, patient will benefit from getting discharged to TCU, patient is also interested in inpatient chemical dependency program, see below     Alcohol use disorder   Alcohol withdrawal   Pt tachycardic and tremulous on arrival to the ED. She usually has about 1 pint of demetrice daily. Her last drink was reportedly two days ago prior to her  fall. She continues to be tremulous.     --Long discussion with patient and her daughter on 03/14, patient thinks that she has hit the rock bottom, would like to stop drinking, aware about previous failures due to craving and also associated mental illness with depression and anxiety.  --Chemical dependency and psych has been consulted  -- are consulted, they are already looking into inpatient chemical dependency program  --At this time, plan is for patient to go to short-term rehab, in the meantime referral for inpatient chemical dependency treatment programs are sent, patient is planned to continue once bed is available.  --Continue Thiamine/folate multivitamin   --Patient has not required any medications for alcohol withdrawal for more than 48 hours now.     Dyspnea : Although asthma and COPD was mentioned in his previous note patient does not have any known history of asthma, she does have a secondhand smoking exposure for few years in the past but does not have personal history of COPD either.  Patient did have recent hospitalization with upper respiratory tract infections when she was diagnosed with bronchitis and was also evaluated by pulmonary and has been started on inhalers.  At this time her dyspnea also seems to be multifactorial from underlying pulmonary hypertension , severe aortic stenosis and chronic congestive heart failure with preserved ejection fraction.  She is on Dulera, albuterol and duonebs PTA but this is not clearly documented int he medical record.      --CXR showed only low lung volumes (normal heart size, no infiltrates)  --BNP is normal, nothing for CHF on CXR.patient did receive an additional dose of Lasix yesterday due to the concern for increased bilateral lower extremity swelling, her BNP came back in normal range, at this time patient will be continuing her Lasix 20 mg p.o. daily which is her PTA dose after the discharge.  --I also had long discussion with patient  and her daughter during this hospitalization about the diagnosis of COPD and asthma at this time patient does not have any history of above diagnosis and also she does not have any symptoms patient did have secondhand exposure to smoking earlier in 70s.  --Continue her PTA Dulera, albuterol and DuoNebs as needed which were prescribed to her by pulmonary when she was admitted to the hospital in December for intermittent bronchospasm.  --Patient will benefit from outpatient pulmonary follow-up and having outpatient pulmonary function testing.    ASSESSMENT      Enrollment  Primary Care Care Coordination Status: Declined    Discharge Assessment  How are you doing now that you are home?: Doing great  How are your symptoms? (Red Flag symptoms escalate to triage hotline per guidelines): Improved  Do you feel your condition is stable enough to be safe at home until your provider visit?: Yes  Does the patient have their discharge instructions? : Yes  Does the patient have questions regarding their discharge instructions? : No  Were you started on any new medications or were there changes to any of your previous medications? : No  Does the patient have all of their medications?: Yes  Do you have questions regarding any of your medications? : No  Do you have all of your needed medical supplies or equipment (DME)?  (i.e. oxygen tank, CPAP, cane, etc.): Yes  Discharge follow-up appointment scheduled within 14 calendar days? : No  Is patient agreeable to assistance with scheduling? : No         Post-op (Clinicians Only)  Did the patient have surgery or a procedure: No  Fever: No  Chills: No  Eating & Drinking: eating and drinking without complaints/concerns    PLAN                                                      Outpatient Plan:      Future Appointments   Date Time Provider Department Center   3/29/2022  1:00 PM CS CCC SW CSNUR CS         For any urgent concerns, please contact our 24 hour nurse triage line: [phone]  [phone])       Jailene Ureña [facility]  Clinic Care Coordinator  [facility] Kristal Thomas  [facility]  [phone]  [email or web address]

## 2022-04-11 ENCOUNTER — TELEPHONE (OUTPATIENT)
Dept: CARDIOLOGY | Facility: CLINIC | Age: 77
End: 2022-04-11
Payer: COMMERCIAL

## 2022-04-11 NOTE — TELEPHONE ENCOUNTER
TAVR referral received by: Dr. Ugarte    Chart reviewed, recent Cr/GFR noted:  Lab Test 03/24/22  0721 03/21/22  0545    140   POTASSIUM 3.7 3.5   CHLORIDE 105 104   CO2 29 31   BUN 8 8   GFR 54 47   CR 1.05* 1.19*   ANIONGAP 5 5   BIRGIT 8.9 8.3*   GLC 75 59*   Contrast allergy: No  Support/living situation: Living at home, looking to be accepted to inpatient alcohol rehab in the next couple days-thinks it can be 21-30 day stay.  Telephoned patient to introduce self, provide education on aortic stenosis. Patient denies dizziness, lightheadedness, syncope. She states she does have occasional shortness of breath relieved by Duoneb (started in Feb 2022 with Influenza) Scheduled TAVR appt with Dr. Parker 6/9/22. Will provide new patient packet with appt reminder in the mail.    Nayeli Jesus RN  Structural Heart Coordinator  [facility]      ----- Message from Herman Ugarte MD sent at 4/11/2022  9:38 AM CDT -----  Lets go straight to TAVR clinic.   Thanks for checking with me.    ----- Message -----  From: Nayeli Jesus RN  Sent: 4/8/2022   1:22 PM CDT  To: MD Dr. Torito Rodriguez-  Linda was noted to have severe aortic stenosis on her echo during recent hospitalization - came in post-fall in alcohol withdrawal. She does not have follow-up scheduled. Would you like to see the patient first or shall I call her to schedule TAVR clinic appt?    Thanks,  Nayeli DILLON

## 2022-04-26 ENCOUNTER — PATIENT OUTREACH (OUTPATIENT)
Dept: CARE COORDINATION | Facility: CLINIC | Age: 77
End: 2022-04-26
Payer: COMMERCIAL

## 2022-04-26 NOTE — PROGRESS NOTES
S-(situation): TCU Discharge Progression    B-(background): Patient was discharged on 3/26/22 from [facility] T[facility] to home.    A-(assessment): Patient declined primary care-care coordination services.  Patient was a No Show to her PCP appointment on 4/6/22.  Patient has a future cardiology appointment.  No new network navigation needs identified.    R-(recommendations/plan): RN Clinical Product Navigator will perform no further monitoring/outreaches at this time.    Melissa Behl BSN, RN, PHN, [facility]  RN Clinical Product Navigator  [phone]

## 2022-04-27 ENCOUNTER — TELEPHONE (OUTPATIENT)
Dept: FAMILY MEDICINE | Facility: CLINIC | Age: 77
End: 2022-04-27
Payer: COMMERCIAL

## 2022-04-27 NOTE — TELEPHONE ENCOUNTER
AUDRA to Dr. Ackerman who has been prescribing the Seroquel.      Nayeli Castorena MBA, MS, PA-C  [facility]

## 2022-04-27 NOTE — TELEPHONE ENCOUNTER
[facility] calling stating that they would like the PCP to know that there was a few different providers that are prescribing the Seroquel XR. And they are asking if you are aware of this?     On our records have pt on a  50 mg  Tablet from 9/2021 30 tabs with 8 refills  and 150 mg XR tabs (2/2022 60 tabs and 1 refill)   presently from us.       The other providers have written scripts 150 XR one was in Jan 30 day supply and then in March 30 day supply.     Yue from [employer]   [facility] # [phone]   Case # [id number]     This is more of an FYI     Thank you       Ryann Harrison RN, BSN  [facility]

## 2022-05-17 ENCOUNTER — OFFICE VISIT (OUTPATIENT)
Dept: CARDIOLOGY | Facility: CLINIC | Age: 77
End: 2022-05-17
Payer: COMMERCIAL

## 2022-05-17 ENCOUNTER — TRANSFERRED RECORDS (OUTPATIENT)
Dept: HEALTH INFORMATION MANAGEMENT | Facility: CLINIC | Age: 77
End: 2022-05-17

## 2022-05-17 VITALS
OXYGEN SATURATION: 97 % | WEIGHT: 186.6 LBS | DIASTOLIC BLOOD PRESSURE: 82 MMHG | BODY MASS INDEX: 31.86 KG/M2 | HEART RATE: 75 BPM | SYSTOLIC BLOOD PRESSURE: 125 MMHG | HEIGHT: 64 IN

## 2022-05-17 DIAGNOSIS — R22.31 LOCALIZED SWELLING ON RIGHT HAND: Primary | ICD-10-CM

## 2022-05-17 PROCEDURE — 99213 OFFICE O/P EST LOW 20 MIN: CPT | Performed by: PHYSICIAN ASSISTANT

## 2022-05-17 NOTE — PROGRESS NOTES
Primary Cardiologist: Dr. Ugarte    Reason for Visit: Hospital follow up    History of Present Illness:   Linda's history is significant for:  1.  Severe symptomatic aortic valve stenosis, referred to [facility] clinic  2.  Stable mild ascending aorta dilatation of 4.1 cm with normal aortic root.  Confirmed on CT angiogram.  3.  Treated hypertension.  4.  Treated hyperlipidemia.  5.  BMI 32 kg/m2.  Status post previous gastric bypass surgery.  6.  Long history of alcohol dependency with multiple rehabilitation interventions and admission; sober now  7.  Never-tobacco user.  8. Chronic HFpEF, on lasix 20 mg    Linda returns to clinic stating she feels much better. She is on lasix 20 mg daily. Her leg swelling has improved. She developed left hand swelling 2 days ago with tingling and numbness of her entire right arm. Her wrist seems really swollen. No hx of arthritis.     Assessment and Plan:  Linda's history is significant for:  1.  Severe symptomatic aortic valve stenosis, referred to [facility] clinic  2.  Stable mild ascending aorta dilatation of 4.1 cm with normal aortic root.  Confirmed on CT angiogram.  3.  Treated hypertension.  4.  Treated hyperlipidemia.  5.  BMI 32 kg/m2.  Status post previous gastric bypass surgery.  6.  Long history of alcohol dependency with multiple rehabilitation interventions and admission; sober now  7.  Never-tobacco user.  8. Chronic HFpEF, on lasix 20 mg    She appears to be doing well from a cardiac standpoint. She has follow up with Dr. Parker in the structural heart clinic. In regard to her right hand edema and right arm discomfort, I wonder if she is having arthritic inflammation versus gout. I am not sure if this represents venous thrombosis. She denies recent fall or trauma. She has not followed with her PCP. I have ordered venous duplex US to rule out thrombosis and asked that she reaches out to PCP for alternative causes.     This note was completed in part using Dragon voice recognition  software. Although reviewed after completion, some word and grammatical errors may occur.    Orders this Visit:  Orders Placed This Encounter   Procedures     Vasc Venous Mapping Unilat RIGHT     No orders of the defined types were placed in this encounter.    There are no discontinued medications.      Encounter Diagnosis   Name Primary?     Localized swelling on right hand Yes       CURRENT MEDICATIONS:  Current Outpatient Medications   Medication Sig Dispense Refill     acamprosate (CAMPRAL) 333 MG EC tablet Take 2 tablets (666 mg) by mouth 3 times daily 180 tablet 1     acetaminophen (TYLENOL) 500 MG tablet Take 1,000 mg by mouth 2 times daily as needed for pain       albuterol (PROAIR HFA/PROVENTIL HFA/VENTOLIN HFA) 108 (90 Base) MCG/ACT inhaler Inhale 2 puffs into the lungs every 6 hours as needed for wheezing 18 g 0     aspirin 81 MG EC tablet Take 81 mg by mouth daily       atenolol (TENORMIN) 25 MG tablet Take 1 tablet (25 mg) by mouth daily 100 tablet 3     citalopram (CELEXA) 20 MG tablet Take 1 tablet (20 mg) by mouth daily 90 tablet 1     folic acid (FOLVITE) 1 MG tablet Take 1 mg by mouth daily       furosemide (LASIX) 20 MG tablet Take 1 tablet (20 mg) by mouth daily Please call for an appointment in May. [phone] 90 tablet 0     gabapentin (NEURONTIN) 300 MG capsule Take 1 capsule (300 mg) by mouth 3 times daily 270 capsule 1     hydrOXYzine (ATARAX) 25 MG tablet Take 25 mg by mouth every 6 hours as needed for itching       ipratropium - albuterol 0.5 mg/2.5 mg/3 mL (DUONEB) 0.5-2.5 (3) MG/3ML neb solution Take 1 vial by nebulization 2 times daily And BID PRN       methocarbamol (ROBAXIN) 500 MG tablet Take 1 tablet (500 mg) by mouth 2 times daily 180 tablet 1     mirtazapine (REMERON) 15 MG tablet Take 1 tablet (15 mg) by mouth At Bedtime 30 tablet 1     MISC NATURAL PRODUCTS PO Take 1 capsule by mouth daily B-glucan capsule        MISC NATURAL PRODUCTS PO Take 1 tablet by mouth daily Keto -  Ketogenesis tablets        MISC NATURAL PRODUCTS PO Take 1 tablet by mouth daily Exipure Homeopathic        MISC NATURAL PRODUCTS PO Take 1 tablet by mouth daily L-Tryptophan 1000 mg daily        mometasone-formoterol (DULERA) 200-5 MCG/ACT inhaler Inhale 2 puffs into the lungs 2 times daily 13 g 0     Multiple Vitamins-Minerals (CENTRUM SILVER) per tablet Take 1 tablet by mouth daily       polyethylene glycol (MIRALAX) 17 GM/Dose powder Take 17 g by mouth 2 times daily as needed for constipation       pravastatin (PRAVACHOL) 20 MG tablet Take 1 tablet (20 mg) by mouth daily 90 tablet 1     QUEtiapine (SEROQUEL XR) 150 MG TB24 24 hr tablet Take 2 tablets (300 mg) by mouth At Bedtime 60 tablet 1     QUEtiapine (SEROQUEL) 50 MG tablet Take 1 tablet (50 mg) by mouth nightly as needed (Anxiety, insomnia, hallucinations) 30 tablet 8     senna-docusate (SENOKOT-S/PERICOLACE) 8.6-50 MG tablet Take 1 tablet by mouth 2 times daily       traZODone (DESYREL) 100 MG tablet TAKE 1 TABLET NIGHTLY AS NEEDED FOR SLEEP 30 tablet 1     valACYclovir (VALTREX) 1000 mg tablet Take 2,000 mg by mouth as needed X1 ASAP at symptom onset of cold sore       diclofenac (VOLTAREN) 50 MG EC tablet Take 1 tablet (50 mg) by mouth 2 times daily as needed for moderate pain (Patient not taking: Reported on 5/17/2022) 90 tablet 1       ALLERGIES     Allergies   Allergen Reactions     Bactrim [Sulfamethoxazole W/Trimethoprim] Hives     Codeine Itching     NAUSEA     Morphine Itching     NAUSEA       PAST MEDICAL HISTORY:  Past Medical History:   Diagnosis Date     Alcohol abuse     Long term alcohol abuse. Abstinenet since October 2019.     Anxiety disorder      Ascending aorta dilatation (H)      Bariatric surgery status 1996?    gastric bypass, [facility] and     Benign hypertension      Chronic insomnia      Chronic pain syndrome     Chronic back and neck pain, chronic pain due to osteoarthritis multiple joints     Coronary artery disease involving  native coronary artery of native heart without angina pectoris 10/16/2018    Minimal coronary artery disease on coronary angiogram in 2015.      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     Major depressive disorder, single episode, severe, without mention of psychotic behavior      Mixed hyperlipidemia      Moderate aortic stenosis 05/2014    moderate to severe aortic valve stenosis     Pelvic relaxation disorder     Surgical intervention for cystocele/rectocele 3,11/2012     Personal history of urinary calculi 6/2006    left ureteral stone,lithotripsy     Psoriasis      PVC (premature ventricular contraction)      Spinal stenosis      Stage III chronic kidney disease (H) 2005     SVT (supraventricular tachycardia) (H)     likely atrial tachycardia       PAST SURGICAL HISTORY:  Past Surgical History:   Procedure Laterality Date     APPENDECTOMY  3/2004    incidental     ARTHRODESIS TOE(S) Right 1/31/2020    Procedure: RIGHT FIRST METATARSAL PHALANGEAL JOINT ARTHRODESIS;  Surgeon: Steven Reyes MD;  Location:  OR      MEDIASTINOSCOPY W OR WO BIOPSY  2/2008    Videomediastinoscopy and, for mediastinal adenopathy -reactive lymphoid hyperplasia     CARPAL TUNNEL RELEASE RT/LT  10/2010    Carpometacarpal excisional arthroplasty with a fascial autograft and APL suspension sling (35300). 2. Left thumb metacarpophalangeal joint fusion with autologous bone graft (33300). 3. Left endoscopic carpal tunnel release      CHOLECYSTECTOMY, LAPOROSCOPIC  11/2010    Cholecystectomy, Laparoscopic     COLONOSCOPY N/A 9/8/2016    Procedure: COMBINED COLONOSCOPY, SINGLE OR MULTIPLE BIOPSY/POLYPECTOMY BY BIOPSY;  Surgeon: Moe Barlow MD;  Location:  GI     CYSTOCELE REPAIR   11/2012    davinci laparoscopic sacrocolpopexy, enterocele repair, lysis of adhesions, placement of retropubic mid urethral sling, cystoscopy     CYSTOSCOPY, LITHOTRIPSY, COMBINED  6/2006    Left extracorporeal shock wave lithotripsy, cystoscopy, left ureteral stent placement.     CYSTOSCOPY, REMOVE STENT(S), COMBINED  7/2006    Cystoscopy, removal of left ureteral stent, retrograde pyelography, flexible and rigid ureteroscopy and holmium laser lithotripsy, basket removal of stone fragments, ureteral stent placement.      ENDOSCOPIC ULTRASOUND UPPER GASTROINTESTINAL TRACT (GI) N/A 6/12/2017    Procedure: ENDOSCOPIC ULTRASOUND, ESOPHAGOSCOPY / UPPER GASTROINTESTINAL TRACT (GI);  ENDOSCOPIC ULTRASOUND, ESOPHAGOSCOPY / UPPER GASTROINTESTINAL TRACT (GI);  Surgeon: Parth Graham MD;  Location:  GI     HERNIA REPAIR  4/2012    bilateral augmentation mastopexy, ventral hernia repair, and medial thigh liposuction on 04/06/2012.      HYSTERECTOMY VAGINAL, BILATERAL SALPINGO-OOPHERECTOMY, COMBINED  1998    due to myoma and bleeding     JOINT REPLACEMENT, HIP RT/LT  4/2004    right total hip arthroplasty     LAPAROTOMY, LYSIS ADHESIONS, COMBINED  3/2004    lysis adhesions, ventral hernia repair, appendectomy incidentally     LYMPH NODE BIOPSY  4/2008    right axillary, reactive follicular and paracortical hyperplasia.     MAMMOPLASTY AUGMENTATION BILATERAL  4/2012     REPAIR HAMMER TOE Right 1/31/2020    Procedure: WITH SECOND AND THIRD CLAW TOE RECONSTRUCTION;  Surgeon: Steven Reyes MD;  Location:  OR     REVISE RECONSTRUCTED BREAST  6/7/2012    Left breast capsulotomy.      ZZC GASTRIC BYPASS,OBESE<100CM ARIANNA-EN-Y  1996     [facility] REPAIR OF RECTOCELE  3/2012     [facility] TOTAL KNEE ARTHROPLASTY  12/2005    left        FAMILY HISTORY:  Family History   Problem Relation Age of Onset     Substance Abuse Father      Cancer Father         throat and lung mets     Diabetes No family hx of      Coronary Artery Disease No  "family hx of      Cerebrovascular Disease No family hx of        SOCIAL HISTORY:  Social History     Socioeconomic History     Marital status:      Spouse name: Mt     Number of children: 4     Years of education: 18   Occupational History     Occupation: nurse     Employer: [employer]     Employer: RETIRED   Tobacco Use     Smoking status: Never Smoker     Smokeless tobacco: Never Used   Substance and Sexual Activity     Alcohol use: Yes     Alcohol/week: 63.0 standard drinks     Types: 63 Standard drinks or equivalent per week     Comment: 6 drinks daily     Drug use: No     Sexual activity: Yes     Partners: Male   Other Topics Concern     Blood Transfusions No     Caffeine Concern Yes     Comment: 1-2 cups per day      Occupational Exposure Yes     Comment: blood     Hobby Hazards No     Sleep Concern Yes     Stress Concern Yes     Weight Concern Yes     Comment: gastric  byepass     Special Diet No     Back Care No     Exercise Yes     Comment: walk, swin     Bike Helmet No     Seat Belt Yes     Self-Exams Yes       Review of Systems:  Skin:  Negative     Eyes:  Negative glasses  ENT:  Negative    Respiratory:  Positive for dyspnea on exertion  Cardiovascular:    Positive for;fatigue;edema  Gastroenterology: Negative    Genitourinary:  Negative    Musculoskeletal:  Positive for arthritis;foot pain;back pain  Neurologic:  Positive for numbness or tingling of hands  Psychiatric:  Positive for sleep disturbances;anxiety (4-5 hrs per night)  Heme/Lymph/Imm:  not assessed allergies  Endocrine:  Negative      Physical Exam:  Vitals: /82   Pulse 75   Ht 1.626 m (5' 4\")   Wt 84.6 kg (186 lb 9.6 oz)   SpO2 97%   BMI 32.03 kg/m       GEN:  NAD  NECK: No JVD  C/V:  Regular rate and rhythm, no murmur, rub or gallop.  RESP: Clear to auscultation bilaterally without wheezing, rales, or rhonchi.  GI: Abdomen soft, nontender, nondistended.   EXTREM: Trace pitting LE edema (R>L).  NEURO: Alert and oriented, " cooperative. No obvious focal deficits.   PSYCH: Normal affect.  SKIN: Warm and dry. Swelling of right hand from wrist to fingers.         Recent Lab Results:  LIPID RESULTS:  Lab Results   Component Value Date    CHOL 218 (H) 10/20/2021    CHOL 300 (H) 03/03/2021    HDL 86 10/20/2021     03/03/2021    LDL 85 10/20/2021     (H) 03/03/2021    TRIG 234 (H) 10/20/2021    TRIG 77 03/03/2021    CHOLHDLRATIO 1.5 07/28/2015       LIVER ENZYME RESULTS:  Lab Results   Component Value Date    AST 41 03/10/2022    AST 20 03/30/2021    ALT 23 03/10/2022    ALT 16 03/30/2021       CBC RESULTS:  Lab Results   Component Value Date    WBC 5.9 03/21/2022    WBC 5.6 03/27/2021    RBC 2.88 (L) 03/21/2022    RBC 3.64 (L) 03/27/2021    HGB 10.0 (L) 03/24/2022    HGB 10.7 (L) 03/27/2021    HCT 27.8 (L) 03/21/2022    HCT 33.4 (L) 03/27/2021    MCV 97 03/21/2022    MCV 92 03/27/2021    MCH 28.8 03/21/2022    MCH 29.4 03/27/2021    MCHC 29.9 (L) 03/21/2022    MCHC 32.0 03/27/2021    RDW 16.1 (H) 03/21/2022    RDW 15.7 (H) 03/27/2021     03/21/2022     03/27/2021       BMP RESULTS:  Lab Results   Component Value Date     03/24/2022     05/12/2021    POTASSIUM 3.7 03/24/2022    POTASSIUM 4.0 05/12/2021    CHLORIDE 105 03/24/2022    CHLORIDE 107 05/12/2021    CO2 29 03/24/2022    CO2 30 05/12/2021    ANIONGAP 5 03/24/2022    ANIONGAP 3 05/12/2021    GLC 75 03/24/2022     (H) 05/12/2021    BUN 8 03/24/2022    BUN 15 05/12/2021    CR 1.05 (H) 03/24/2022    CR 1.00 05/12/2021    GFRESTIMATED 54 (L) 03/24/2022    GFRESTIMATED 54 (L) 05/12/2021    GFRESTBLACK 63 05/12/2021    BIRGIT 8.9 03/24/2022    BIRGIT 9.4 05/12/2021        A1C RESULTS:  Lab Results   Component Value Date    A1C 5.7 09/29/2010       INR RESULTS:  Lab Results   Component Value Date    INR 0.9 03/30/2021    INR 1.03 06/16/2017           Michael Kinsey PA-C  May 17, 2022

## 2022-05-17 NOTE — LETTER
5/17/2022    Nayeli Castorena PA-C  [address] [facility] [address]    RE: Kavitha R Fang       Dear Colleague,     I had the pleasure of seeing Kavithazenobia Headley in the [facility].  Primary Cardiologist: Dr. Ugarte    Reason for Visit: Hospital follow up    History of Present Illness:   Linda's history is significant for:  1.  Severe symptomatic aortic valve stenosis, referred to TAVR clinic  2.  Stable mild ascending aorta dilatation of 4.1 cm with normal aortic root.  Confirmed on CT angiogram.  3.  Treated hypertension.  4.  Treated hyperlipidemia.  5.  BMI 32 kg/m2.  Status post previous gastric bypass surgery.  6.  Long history of alcohol dependency with multiple rehabilitation interventions and admission; sober now  7.  Never-tobacco user.  8. Chronic HFpEF, on lasix 20 mg    Linda returns to clinic stating she feels much better. She is on lasix 20 mg daily. Her leg swelling has improved. She developed left hand swelling 2 days ago with tingling and numbness of her entire right arm. Her wrist seems really swollen. No hx of arthritis.     Assessment and Plan:  Linda's history is significant for:  1.  Severe symptomatic aortic valve stenosis, referred to TAVR clinic  2.  Stable mild ascending aorta dilatation of 4.1 cm with normal aortic root.  Confirmed on CT angiogram.  3.  Treated hypertension.  4.  Treated hyperlipidemia.  5.  BMI 32 kg/m2.  Status post previous gastric bypass surgery.  6.  Long history of alcohol dependency with multiple rehabilitation interventions and admission; sober now  7.  Never-tobacco user.  8. Chronic HFpEF, on lasix 20 mg    She appears to be doing well from a cardiac standpoint. She has follow up with Dr. Parker in the structural heart clinic. In regard to her right hand edema and right arm discomfort, I wonder if she is having arthritic inflammation versus gout. I am not sure if this represents venous thrombosis. She denies recent fall or trauma. She  has not followed with her PCP. I have ordered venous duplex US to rule out thrombosis and asked that she reaches out to PCP for alternative causes.     This note was completed in part using Dragon voice recognition software. Although reviewed after completion, some word and grammatical errors may occur.    Orders this Visit:  Orders Placed This Encounter   Procedures     Vasc Venous Mapping Unilat RIGHT     No orders of the defined types were placed in this encounter.    There are no discontinued medications.      Encounter Diagnosis   Name Primary?     Localized swelling on right hand Yes       CURRENT MEDICATIONS:  Current Outpatient Medications   Medication Sig Dispense Refill     acamprosate (CAMPRAL) 333 MG EC tablet Take 2 tablets (666 mg) by mouth 3 times daily 180 tablet 1     acetaminophen (TYLENOL) 500 MG tablet Take 1,000 mg by mouth 2 times daily as needed for pain       albuterol (PROAIR HFA/PROVENTIL HFA/VENTOLIN HFA) 108 (90 Base) MCG/ACT inhaler Inhale 2 puffs into the lungs every 6 hours as needed for wheezing 18 g 0     aspirin 81 MG EC tablet Take 81 mg by mouth daily       atenolol (TENORMIN) 25 MG tablet Take 1 tablet (25 mg) by mouth daily 100 tablet 3     citalopram (CELEXA) 20 MG tablet Take 1 tablet (20 mg) by mouth daily 90 tablet 1     folic acid (FOLVITE) 1 MG tablet Take 1 mg by mouth daily       furosemide (LASIX) 20 MG tablet Take 1 tablet (20 mg) by mouth daily Please call for an appointment in May. [phone] 90 tablet 0     gabapentin (NEURONTIN) 300 MG capsule Take 1 capsule (300 mg) by mouth 3 times daily 270 capsule 1     hydrOXYzine (ATARAX) 25 MG tablet Take 25 mg by mouth every 6 hours as needed for itching       ipratropium - albuterol 0.5 mg/2.5 mg/3 mL (DUONEB) 0.5-2.5 (3) MG/3ML neb solution Take 1 vial by nebulization 2 times daily And BID PRN       methocarbamol (ROBAXIN) 500 MG tablet Take 1 tablet (500 mg) by mouth 2 times daily 180 tablet 1     mirtazapine (REMERON)  15 MG tablet Take 1 tablet (15 mg) by mouth At Bedtime 30 tablet 1     MISC NATURAL PRODUCTS PO Take 1 capsule by mouth daily B-glucan capsule        MISC NATURAL PRODUCTS PO Take 1 tablet by mouth daily Keto - Ketogenesis tablets        MISC NATURAL PRODUCTS PO Take 1 tablet by mouth daily Exipure Homeopathic        MISC NATURAL PRODUCTS PO Take 1 tablet by mouth daily L-Tryptophan 1000 mg daily        mometasone-formoterol (DULERA) 200-5 MCG/ACT inhaler Inhale 2 puffs into the lungs 2 times daily 13 g 0     Multiple Vitamins-Minerals (CENTRUM SILVER) per tablet Take 1 tablet by mouth daily       polyethylene glycol (MIRALAX) 17 GM/Dose powder Take 17 g by mouth 2 times daily as needed for constipation       pravastatin (PRAVACHOL) 20 MG tablet Take 1 tablet (20 mg) by mouth daily 90 tablet 1     QUEtiapine (SEROQUEL XR) 150 MG TB24 24 hr tablet Take 2 tablets (300 mg) by mouth At Bedtime 60 tablet 1     QUEtiapine (SEROQUEL) 50 MG tablet Take 1 tablet (50 mg) by mouth nightly as needed (Anxiety, insomnia, hallucinations) 30 tablet 8     senna-docusate (SENOKOT-S/PERICOLACE) 8.6-50 MG tablet Take 1 tablet by mouth 2 times daily       traZODone (DESYREL) 100 MG tablet TAKE 1 TABLET NIGHTLY AS NEEDED FOR SLEEP 30 tablet 1     valACYclovir (VALTREX) 1000 mg tablet Take 2,000 mg by mouth as needed X1 ASAP at symptom onset of cold sore       diclofenac (VOLTAREN) 50 MG EC tablet Take 1 tablet (50 mg) by mouth 2 times daily as needed for moderate pain (Patient not taking: Reported on 5/17/2022) 90 tablet 1       ALLERGIES     Allergies   Allergen Reactions     Bactrim [Sulfamethoxazole W/Trimethoprim] Hives     Codeine Itching     NAUSEA     Morphine Itching     NAUSEA       PAST MEDICAL HISTORY:  Past Medical History:   Diagnosis Date     Alcohol abuse     Long term alcohol abuse. Abstinenet since October 2019.     Anxiety disorder      Ascending aorta dilatation (H)      Bariatric surgery status 1996?    gastric  bypass, [facility] and     Benign hypertension      Chronic insomnia      Chronic pain syndrome     Chronic back and neck pain, chronic pain due to osteoarthritis multiple joints     Coronary artery disease involving native coronary artery of native heart without angina pectoris 10/16/2018    Minimal coronary artery disease on coronary angiogram in 2015.      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     Major depressive disorder, single episode, severe, without mention of psychotic behavior      Mixed hyperlipidemia      Moderate aortic stenosis 05/2014    moderate to severe aortic valve stenosis     Pelvic relaxation disorder     Surgical intervention for cystocele/rectocele 3,11/2012     Personal history of urinary calculi 6/2006    left ureteral stone,lithotripsy     Psoriasis      PVC (premature ventricular contraction)      Spinal stenosis      Stage III chronic kidney disease (H) 2005     SVT (supraventricular tachycardia) (H)     likely atrial tachycardia       PAST SURGICAL HISTORY:  Past Surgical History:   Procedure Laterality Date     APPENDECTOMY  3/2004    incidental     ARTHRODESIS TOE(S) Right 1/31/2020    Procedure: RIGHT FIRST METATARSAL PHALANGEAL JOINT ARTHRODESIS;  Surgeon: Steven Reyes MD;  Location: [facility] OR     C MEDIASTINOSCOPY W OR WO BIOPSY  2/2008    Videomediastinoscopy and, for mediastinal adenopathy -reactive lymphoid hyperplasia     CARPAL TUNNEL RELEASE RT/LT  10/2010    Carpometacarpal excisional arthroplasty with a fascial autograft and APL suspension sling (87464). 2. Left thumb metacarpophalangeal joint fusion with autologous bone graft (11833). 3. Left endoscopic carpal tunnel release      CHOLECYSTECTOMY, LAPOROSCOPIC  11/2010     Cholecystectomy, Laparoscopic     COLONOSCOPY N/A 9/8/2016    Procedure: COMBINED COLONOSCOPY, SINGLE OR MULTIPLE BIOPSY/POLYPECTOMY BY BIOPSY;  Surgeon: Moe Barlow MD;  Location:  GI     CYSTOCELE REPAIR  11/2012    davinci laparoscopic sacrocolpopexy, enterocele repair, lysis of adhesions, placement of retropubic mid urethral sling, cystoscopy     CYSTOSCOPY, LITHOTRIPSY, COMBINED  6/2006    Left extracorporeal shock wave lithotripsy, cystoscopy, left ureteral stent placement.     CYSTOSCOPY, REMOVE STENT(S), COMBINED  7/2006    Cystoscopy, removal of left ureteral stent, retrograde pyelography, flexible and rigid ureteroscopy and holmium laser lithotripsy, basket removal of stone fragments, ureteral stent placement.      ENDOSCOPIC ULTRASOUND UPPER GASTROINTESTINAL TRACT (GI) N/A 6/12/2017    Procedure: ENDOSCOPIC ULTRASOUND, ESOPHAGOSCOPY / UPPER GASTROINTESTINAL TRACT (GI);  ENDOSCOPIC ULTRASOUND, ESOPHAGOSCOPY / UPPER GASTROINTESTINAL TRACT (GI);  Surgeon: Parth Graham MD;  Location:  GI     HERNIA REPAIR  4/2012    bilateral augmentation mastopexy, ventral hernia repair, and medial thigh liposuction on 04/06/2012.      HYSTERECTOMY VAGINAL, BILATERAL SALPINGO-OOPHERECTOMY, COMBINED  1998    due to myoma and bleeding     JOINT REPLACEMENT, HIP RT/LT  4/2004    right total hip arthroplasty     LAPAROTOMY, LYSIS ADHESIONS, COMBINED  3/2004    lysis adhesions, ventral hernia repair, appendectomy incidentally     LYMPH NODE BIOPSY  4/2008    right axillary, reactive follicular and paracortical hyperplasia.     MAMMOPLASTY AUGMENTATION BILATERAL  4/2012     REPAIR HAMMER TOE Right 1/31/2020    Procedure: WITH SECOND AND THIRD CLAW TOE RECONSTRUCTION;  Surgeon: Steven Reyes MD;  Location:  OR     REVISE RECONSTRUCTED BREAST  6/7/2012    Left breast capsulotomy.      ZZC GASTRIC BYPASS,OBESE<100CM ARIANNA-EN-Y  1996     [facility] REPAIR OF RECTOCELE  3/2012     [facility] TOTAL KNEE ARTHROPLASTY   "12/2005    left        FAMILY HISTORY:  Family History   Problem Relation Age of Onset     Substance Abuse Father      Cancer Father         throat and lung mets     Diabetes No family hx of      Coronary Artery Disease No family hx of      Cerebrovascular Disease No family hx of        SOCIAL HISTORY:  Social History     Socioeconomic History     Marital status:      Spouse name: Mt     Number of children: 4     Years of education: 18   Occupational History     Occupation: nurse     Employer: [employer]     Employer: RETIRED   Tobacco Use     Smoking status: Never Smoker     Smokeless tobacco: Never Used   Substance and Sexual Activity     Alcohol use: Yes     Alcohol/week: 63.0 standard drinks     Types: 63 Standard drinks or equivalent per week     Comment: 6 drinks daily     Drug use: No     Sexual activity: Yes     Partners: Male   Other Topics Concern     Blood Transfusions No     Caffeine Concern Yes     Comment: 1-2 cups per day      Occupational Exposure Yes     Comment: blood     Hobby Hazards No     Sleep Concern Yes     Stress Concern Yes     Weight Concern Yes     Comment: gastric  byepass     Special Diet No     Back Care No     Exercise Yes     Comment: walk, swin     Bike Helmet No     Seat Belt Yes     Self-Exams Yes       Review of Systems:  Skin:  Negative     Eyes:  Negative glasses  ENT:  Negative    Respiratory:  Positive for dyspnea on exertion  Cardiovascular:    Positive for;fatigue;edema  Gastroenterology: Negative    Genitourinary:  Negative    Musculoskeletal:  Positive for arthritis;foot pain;back pain  Neurologic:  Positive for numbness or tingling of hands  Psychiatric:  Positive for sleep disturbances;anxiety (4-5 hrs per night)  Heme/Lymph/Imm:  not assessed allergies  Endocrine:  Negative      Physical Exam:  Vitals: /82   Pulse 75   Ht 1.626 m (5' 4\")   Wt 84.6 kg (186 lb 9.6 oz)   SpO2 97%   BMI 32.03 kg/m       GEN:  NAD  NECK: No JVD  C/V:  Regular rate and " rhythm, no murmur, rub or gallop.  RESP: Clear to auscultation bilaterally without wheezing, rales, or rhonchi.  GI: Abdomen soft, nontender, nondistended.   EXTREM: Trace pitting LE edema (R>L).  NEURO: Alert and oriented, cooperative. No obvious focal deficits.   PSYCH: Normal affect.  SKIN: Warm and dry. Swelling of right hand from wrist to fingers.         Recent Lab Results:  LIPID RESULTS:  Lab Results   Component Value Date    CHOL 218 (H) 10/20/2021    CHOL 300 (H) 03/03/2021    HDL 86 10/20/2021     03/03/2021    LDL 85 10/20/2021     (H) 03/03/2021    TRIG 234 (H) 10/20/2021    TRIG 77 03/03/2021    CHOLHDLRATIO 1.5 07/28/2015       LIVER ENZYME RESULTS:  Lab Results   Component Value Date    AST 41 03/10/2022    AST 20 03/30/2021    ALT 23 03/10/2022    ALT 16 03/30/2021       CBC RESULTS:  Lab Results   Component Value Date    WBC 5.9 03/21/2022    WBC 5.6 03/27/2021    RBC 2.88 (L) 03/21/2022    RBC 3.64 (L) 03/27/2021    HGB 10.0 (L) 03/24/2022    HGB 10.7 (L) 03/27/2021    HCT 27.8 (L) 03/21/2022    HCT 33.4 (L) 03/27/2021    MCV 97 03/21/2022    MCV 92 03/27/2021    MCH 28.8 03/21/2022    MCH 29.4 03/27/2021    MCHC 29.9 (L) 03/21/2022    MCHC 32.0 03/27/2021    RDW 16.1 (H) 03/21/2022    RDW 15.7 (H) 03/27/2021     03/21/2022     03/27/2021       BMP RESULTS:  Lab Results   Component Value Date     03/24/2022     05/12/2021    POTASSIUM 3.7 03/24/2022    POTASSIUM 4.0 05/12/2021    CHLORIDE 105 03/24/2022    CHLORIDE 107 05/12/2021    CO2 29 03/24/2022    CO2 30 05/12/2021    ANIONGAP 5 03/24/2022    ANIONGAP 3 05/12/2021    GLC 75 03/24/2022     (H) 05/12/2021    BUN 8 03/24/2022    BUN 15 05/12/2021    CR 1.05 (H) 03/24/2022    CR 1.00 05/12/2021    GFRESTIMATED 54 (L) 03/24/2022    GFRESTIMATED 54 (L) 05/12/2021    GFRESTBLACK 63 05/12/2021    BIRGIT 8.9 03/24/2022    BIRGIT 9.4 05/12/2021        A1C RESULTS:  Lab Results   Component Value Date    A1C 5.7  09/29/2010       INR RESULTS:  Lab Results   Component Value Date    INR 0.9 03/30/2021    INR 1.03 06/16/2017           Michael Kinsey PA-C  May 17, 2022     Thank you for allowing me to participate in the care of your patient.      Sincerely,     Michael Kinsey PA-C     [facility]  cc:   No referring provider defined for this encounter.

## 2022-05-17 NOTE — NURSING NOTE
Patient states she has swelling and itching on right leg, she also states she has swelling and pain in right arm.

## 2022-06-08 ENCOUNTER — TELEPHONE (OUTPATIENT)
Dept: CARDIOLOGY | Facility: CLINIC | Age: 77
End: 2022-06-08
Payer: COMMERCIAL

## 2022-06-08 NOTE — TELEPHONE ENCOUNTER
Patient returning a call to the RNs:     Amilcar has a procedure tomorrow and has questions about taking medication and if she should be NPO.    Please return call to the patient.

## 2022-06-08 NOTE — TELEPHONE ENCOUNTER
Patient called with questions regarding scheduled TAVR clinic visit tomorrow, 06/09/2022. Called patient back and left a voicemail with a direct callback phone number provided.

## 2022-06-08 NOTE — TELEPHONE ENCOUNTER
Called patient back and reviewed that tomorrow is an office visit only and patient will not be having a procedure done. Patient was very frustrated by this information and stated she received the information packet and is very symptomatic and thought we would do something to fix that tomorrow. Encouraged patient to look through the information packet that was mailed to her and reviewed that additional imaging and procedures are needed for TAVR workup. Reviewed with patient that it would be unsafe to do a TAVR procedure on her without the proper information. Informed patient who she would be meeting with tomorrow and what to expect. Patient stated she had no additional questions at this time. Patient confirmed her scheduled appointment with Dr. Parker for tomorrow, 06/09/2022, at 1345.

## 2022-06-09 ENCOUNTER — OFFICE VISIT (OUTPATIENT)
Dept: CARDIOLOGY | Facility: CLINIC | Age: 77
End: 2022-06-09

## 2022-06-09 ENCOUNTER — LAB (OUTPATIENT)
Dept: LAB | Facility: CLINIC | Age: 77
End: 2022-06-09
Payer: COMMERCIAL

## 2022-06-09 VITALS
WEIGHT: 188.2 LBS | SYSTOLIC BLOOD PRESSURE: 121 MMHG | DIASTOLIC BLOOD PRESSURE: 63 MMHG | HEART RATE: 69 BPM | HEIGHT: 64 IN | BODY MASS INDEX: 32.13 KG/M2

## 2022-06-09 DIAGNOSIS — I35.0 SEVERE AORTIC STENOSIS: ICD-10-CM

## 2022-06-09 DIAGNOSIS — I35.0 SEVERE AORTIC STENOSIS: Primary | ICD-10-CM

## 2022-06-09 DIAGNOSIS — I25.10 CORONARY ARTERY DISEASE INVOLVING NATIVE CORONARY ARTERY OF NATIVE HEART WITHOUT ANGINA PECTORIS: ICD-10-CM

## 2022-06-09 DIAGNOSIS — I25.10 CORONARY ARTERY DISEASE INVOLVING NATIVE CORONARY ARTERY OF NATIVE HEART WITHOUT ANGINA PECTORIS: Primary | ICD-10-CM

## 2022-06-09 LAB
ANION GAP SERPL CALCULATED.3IONS-SCNC: 6 MMOL/L (ref 3–14)
BUN SERPL-MCNC: 48 MG/DL (ref 7–30)
CALCIUM SERPL-MCNC: 9.5 MG/DL (ref 8.5–10.1)
CHLORIDE BLD-SCNC: 103 MMOL/L (ref 94–109)
CO2 SERPL-SCNC: 27 MMOL/L (ref 20–32)
CREAT SERPL-MCNC: 1.79 MG/DL (ref 0.52–1.04)
GFR SERPL CREATININE-BSD FRML MDRD: 29 ML/MIN/1.73M2
GLUCOSE BLD-MCNC: 103 MG/DL (ref 70–99)
POTASSIUM BLD-SCNC: 4.4 MMOL/L (ref 3.4–5.3)
SODIUM SERPL-SCNC: 136 MMOL/L (ref 133–144)

## 2022-06-09 PROCEDURE — 36415 COLL VENOUS BLD VENIPUNCTURE: CPT | Performed by: INTERNAL MEDICINE

## 2022-06-09 PROCEDURE — 99207 PR NON-BILLABLE SERV PER CHARTING: CPT | Performed by: SURGERY

## 2022-06-09 PROCEDURE — 80048 BASIC METABOLIC PNL TOTAL CA: CPT | Performed by: INTERNAL MEDICINE

## 2022-06-09 PROCEDURE — 99215 OFFICE O/P EST HI 40 MIN: CPT | Performed by: INTERNAL MEDICINE

## 2022-06-09 NOTE — LETTER
6/9/2022    Nayeli Castorena PA-C  [address] [facility] [id number]    RE: Kavitha Headley       Dear Colleague,     I had the pleasure of seeing Kavitha Headley in the [facility].  CARDIOLOGY VALVE CLINIC CONSULT    REASON FOR CONSULT: severe aortic stenosis    PRIMARY CARE PHYSICIAN:  Nayeli Castorena    HISTORY OF PRESENT ILLNESS:    Kavitha Headley is a very nice 76 year old woman here for evaluation of severe aortic stenosis which was diagnosed when she was hospitalized in March 2022.  She has a pertinent history of alcohol use disorder and is recently completed a treatment program, mild coronary disease on angiogram in 2015, mildly dilated ascending aorta, paroxysmal supraventricular tachycardia and PACs, hypertension, and dyslipidemia.    She is here today with her daughter, Nery who is supportive.    She is severely short of breath, even with activities of daily. Her symptoms have worsened over about a year. A yr ago she was working out at a gym doing water walking.  She also gets chest heaviness with activity and orthostatic lightheadedness.  She has not had syncope.  She recently was treated with a prednisone burst for arthritis in her right hand and ankle and the arthritis symptoms have resolved.  She has not been on long-term prednisone.    She states she has been sober from alcohol since March 3rd, though chart indicates maybe the date was 5/17/22.  She is a lifelong nonsmoker.     History of adverse reaction to anesthesia or abnormal airway: no  History of bleeding diathesis: denies  Contrast allergy: no     Pathophysiology and natural history of severe arctic stenosis were reviewed.  Options for management including medical therapy which is not curative versus transcatheter or surgical aortic valve replacement and risks and benefits were discussed.    PAST MEDICAL HISTORY:  Past Medical History:   Diagnosis Date     Alcohol abuse      Anxiety disorder      Ascending  aorta dilatation (H)      Bariatric surgery status 1996?    gastric bypass, [facility] and     Benign hypertension      Chronic insomnia      Chronic pain syndrome     Chronic back and neck pain, chronic pain due to osteoarthritis multiple joints     Coronary artery disease involving native coronary artery of native heart without angina pectoris 10/16/2018    Minimal coronary artery disease on coronary angiogram in 2015.      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     Major depressive disorder, single episode, severe, without mention of psychotic behavior      Mixed hyperlipidemia      Pelvic relaxation disorder     Surgical intervention for cystocele/rectocele 3,11/2012     Personal history of urinary calculi 06/2006    left ureteral stone,lithotripsy     Psoriasis      PVC (premature ventricular contraction)      Severe aortic stenosis      Spinal stenosis      Stage III chronic kidney disease (H) 2005     SVT (supraventricular tachycardia) (H)     likely atrial tachycardia       MEDICATIONS:  Current Outpatient Medications   Medication     acamprosate (CAMPRAL) 333 MG EC tablet     acetaminophen (TYLENOL) 500 MG tablet     albuterol (PROAIR HFA/PROVENTIL HFA/VENTOLIN HFA) 108 (90 Base) MCG/ACT inhaler     aspirin 81 MG EC tablet     atenolol (TENORMIN) 25 MG tablet     citalopram (CELEXA) 20 MG tablet     diclofenac (VOLTAREN) 50 MG EC tablet     folic acid (FOLVITE) 1 MG tablet     furosemide (LASIX) 20 MG tablet     gabapentin (NEURONTIN) 300 MG capsule     hydrOXYzine (ATARAX) 25 MG tablet     ipratropium - albuterol 0.5 mg/2.5 mg/3 mL (DUONEB) 0.5-2.5 (3) MG/3ML neb solution     methocarbamol (ROBAXIN) 500 MG tablet     mirtazapine (REMERON) 15 MG tablet      MISC NATURAL PRODUCTS PO     MISC NATURAL PRODUCTS PO     mometasone-formoterol (DULERA) 200-5 MCG/ACT inhaler     Multiple Vitamins-Minerals (CENTRUM SILVER) per tablet     polyethylene glycol (MIRALAX) 17 GM/Dose powder     pravastatin (PRAVACHOL) 20 MG tablet     QUEtiapine (SEROQUEL XR) 150 MG TB24 24 hr tablet     QUEtiapine (SEROQUEL) 50 MG tablet     senna-docusate (SENOKOT-S/PERICOLACE) 8.6-50 MG tablet     traZODone (DESYREL) 100 MG tablet     valACYclovir (VALTREX) 1000 mg tablet     MISC NATURAL PRODUCTS PO     MISC NATURAL PRODUCTS PO     No current facility-administered medications for this visit.       ALLERGIES:  Allergies   Allergen Reactions     Bactrim [Sulfamethoxazole W/Trimethoprim] Hives     Codeine Itching     NAUSEA     Morphine Itching     NAUSEA       SOCIAL HISTORY:  I have reviewed this patient's social history and updated it with pertinent information if needed. Kavitha Headley  reports that she has never smoked. She has never used smokeless tobacco. She reports previous alcohol use of about 63.0 standard drinks of alcohol per week. She reports that she does not use drugs.    FAMILY HISTORY:  I have reviewed this patient's family history and updated it with pertinent information if needed.   Family History   Problem Relation Age of Onset     Substance Abuse Father      Cancer Father         throat and lung mets     Diabetes No family hx of      Coronary Artery Disease No family hx of      Cerebrovascular Disease No family hx of        REVIEW OF SYSTEMS:  Skin:  Positive for pigmentation;itching   Eyes:  Negative glasses  ENT:  Negative    Respiratory:  Positive for dyspnea on exertion;wheezing  Cardiovascular:    Positive for;fatigue;edema;heaviness;lightheadedness  Gastroenterology: Negative    Genitourinary:  Negative    Musculoskeletal:  Positive for arthritis;foot pain;back pain  Neurologic:  Positive for numbness or tingling of hands  Psychiatric:  Positive for anxiety (4-5 hrs per  night)  Heme/Lymph/Imm:  not assessed allergies  Endocrine:  Negative      PHYSICAL EXAM:      BP: 121/63 Pulse: 69            Vital Signs with Ranges  Pulse:  [69] 69  BP: (121)/(63) 121/63  188 lbs 3.2 oz    Constitutional: awake, alert, no distress  Eyes: sclera nonicteric  ENT: trachea midline  Respiratory: Clear to auscultation bilaterally  Cardiovascular: II/VI crescendo decrescendo systolic murmur loudest at the right upper sternal border  GI: nondistended, nontender, bowel sounds present  Skin: dry, no rash no edema  Musculoskeletal: grossly normal muscle bulk and tone  Neurologic: no focal deficits  Neuropsychiatric: appropriate affact      Review of Systems:  Skin:  Positive for pigmentation;itching   Eyes:  Negative glasses  ENT:  Negative    Respiratory:  Positive for dyspnea on exertion;wheezing  Cardiovascular:    Positive for;fatigue;edema;heaviness;lightheadedness  Gastroenterology: Negative    Genitourinary:  Negative    Musculoskeletal:  Positive for arthritis;foot pain;back pain  Neurologic:  Positive for numbness or tingling of hands  Psychiatric:  Positive for anxiety (4-5 hrs per night)  Heme/Lymph/Imm:  not assessed allergies  Endocrine:  Negative         DATA:   LAST CHOLESTEROL:  Lab Results   Component Value Date    CHOL 218 10/20/2021    CHOL 300 03/03/2021     Lab Results   Component Value Date    HDL 86 10/20/2021     03/03/2021     Lab Results   Component Value Date    LDL 85 10/20/2021     03/03/2021     Lab Results   Component Value Date    TRIG 234 10/20/2021    TRIG 77 03/03/2021     Lab Results   Component Value Date    CHOLHDLRATIO 1.5 07/28/2015       LAST BMP:  Lab Results   Component Value Date     03/24/2022     05/12/2021      Lab Results   Component Value Date    POTASSIUM 3.7 03/24/2022    POTASSIUM 4.0 05/12/2021     Lab Results   Component Value Date    CHLORIDE 105 03/24/2022    CHLORIDE 107 05/12/2021     Lab Results   Component Value Date     BIRGIT 8.9 03/24/2022    BIRGIT 9.4 05/12/2021     Lab Results   Component Value Date    CO2 29 03/24/2022    CO2 30 05/12/2021     Lab Results   Component Value Date    BUN 8 03/24/2022    BUN 15 05/12/2021     Lab Results   Component Value Date    CR 1.05 03/24/2022    CR 1.00 05/12/2021     Lab Results   Component Value Date    GLC 75 03/24/2022     05/12/2021       LAST CBC:  Lab Results   Component Value Date    WBC 5.9 03/21/2022    WBC 5.6 03/27/2021     Lab Results   Component Value Date    RBC 2.88 03/21/2022    RBC 3.64 03/27/2021     Lab Results   Component Value Date    HGB 10.0 03/24/2022    HGB 10.7 03/27/2021     Lab Results   Component Value Date    HCT 27.8 03/21/2022    HCT 33.4 03/27/2021     Lab Results   Component Value Date    MCV 97 03/21/2022    MCV 92 03/27/2021     Lab Results   Component Value Date    MCH 28.8 03/21/2022    MCH 29.4 03/27/2021     Lab Results   Component Value Date    MCHC 29.9 03/21/2022    MCHC 32.0 03/27/2021     Lab Results   Component Value Date    RDW 16.1 03/21/2022    RDW 15.7 03/27/2021     Lab Results   Component Value Date     03/21/2022     03/27/2021     liver function tests  Liver Function Studies - Recent Labs   Lab Test 03/10/22  1454   PROTTOTAL 6.8   ALBUMIN 3.0*   BILITOTAL 0.7   ALKPHOS 111   AST 41   ALT 23         EKG I personally reviewed: Baseline artifact normal sinus rhythm (p-waves most visible in V3), inferior infarct, possible anterior infarct     Echo 3/12/22 images were reviewed.  : Interpretation Summary 1. The left ventricle is normal in size. Left ventricular systolic function isnormal. The visual ejection fraction is 60-65%. No regional wall motionabnormalities noted. There is no thrombus seen in the left ventricle.2. The right ventricle is normal size. The right ventricular systolic functionis normal.3. The left atrium is moderate to severely dilated4. Severe valvular aortic stenosis. The mean AoV pressure gradient is  48.0mmHg. The peak AoV pressure gradient is 83.8 mmHg. The calculated aortic valveare is 0.97 cm^2.5. No pericardial effusion.6. In comparison to the previous report dated 10/20/2021, there has been aninterval increase in transaortic gradients.    There is mild (1+) tricuspid regurgitation. The right ventricular systolic  pressure is approximated at 56.5 mmHg plus the right atrial pressure. Right  ventricular systolic pressure is elevated, consistent with moderate to severe  pulmonary hypertension.        ASSESSMENT:  1. Severe symptomatic aortic stenosis.  Mean gradient 48, peak velocity 84 mmHg, estimated aortic valve area less than 1 cm .  NYHA class II  STS risk estimate 2.7  2. Moderate to severe pulm HTN by echo 3/11/22 RVSP 56 + RAP  3. HTN  4. Dyslipidemia LDL 85 10/20/21, takes pravastatin.  History of liver disease due to alcohol.  5. Alcohol dependence, in remission      Cor angio 2015  Mild CAD    RECOMMENDATIONS:  1. Schedule coronary angigram with possible PCI and right heart catheterization  2. TAVR CT  3. She met with cardiac surgeon Dr. Dumont in clinic today please see his separate note but briefly he concurs with planning for likely transcatheter aortic valve and patient preliminarily requests full bailout.    Risks and benefits of the procedure were discussed with the patient in detail that includes but not limited to the risk of stroke, heart attack, death, cardiac or vascular perforation, emergent stenting or bypass, contrast induced allergic reaction, renal dysfunction (including risks of temporary or permanent dialysis), risk of respiratory depression with sedation, and vascular complications (including bleeding and transfusion). Patient denies any major active bleeding issues and is willing to take and comply with dual antiplatelet therapy and understands associated bleeding risks. Patient understands the overall risks of the procedure and wishes to proceed.    Addendum  Labs today show renal  failure,  Cr 1.8, BUN  Recommend:   -stop lasix  -stop atenolol  -start metoprolol succinate 25mg PO daily.  -Recheck BMP in 4-5 days.     Last Comprehensive Metabolic Panel:  Sodium   Date Value Ref Range Status   06/09/2022 136 133 - 144 mmol/L Final   05/12/2021 140 133 - 144 mmol/L Final     Potassium   Date Value Ref Range Status   06/09/2022 4.4 3.4 - 5.3 mmol/L Final   05/12/2021 4.0 3.4 - 5.3 mmol/L Final     Chloride   Date Value Ref Range Status   06/09/2022 103 94 - 109 mmol/L Final   05/12/2021 107 94 - 109 mmol/L Final     Carbon Dioxide   Date Value Ref Range Status   05/12/2021 30 20 - 32 mmol/L Final     Carbon Dioxide (CO2)   Date Value Ref Range Status   06/09/2022 27 20 - 32 mmol/L Final     Anion Gap   Date Value Ref Range Status   06/09/2022 6 3 - 14 mmol/L Final   05/12/2021 3 3 - 14 mmol/L Final     Glucose   Date Value Ref Range Status   06/09/2022 103 (H) 70 - 99 mg/dL Final   05/12/2021 104 (H) 70 - 99 mg/dL Final     Urea Nitrogen   Date Value Ref Range Status   06/09/2022 48 (H) 7 - 30 mg/dL Final   05/12/2021 15 7 - 30 mg/dL Final     Creatinine   Date Value Ref Range Status   06/09/2022 1.79 (H) 0.52 - 1.04 mg/dL Final   05/12/2021 1.00 0.52 - 1.04 mg/dL Final     GFR Estimate   Date Value Ref Range Status   06/09/2022 29 (L) >60 mL/min/1.73m2 Final     Comment:     Effective December 21, 2021 eGFRcr in adults is calculated using the 2021 CKD-EPI creatinine equation which includes age and gender (Rajesh bran al., NEJM, DOI: 10.1056/GYDIed7283263)   05/12/2021 54 (L) >60 mL/min/[1.73_m2] Final     Comment:     Non  GFR Calc  Starting 12/18/2018, serum creatinine based estimated GFR (eGFR) will be   calculated using the Chronic Kidney Disease Epidemiology Collaboration   (CKD-EPI) equation.       Calcium   Date Value Ref Range Status   06/09/2022 9.5 8.5 - 10.1 mg/dL Final   05/12/2021 9.4 8.5 - 10.1 mg/dL Final           Nora Parker MD [facility]  Text Page          TAVR Coordinator visit:  Provided additional education regarding TAVR procedure, after being present for discussion with physician. Explained the work-up process and next steps for patient. Patient provided our direct contact number and instructed to call with any questions.     Completed frailty testing and KCCQ.   5 meter walk: 4.6  Frailty score: 1/5 (Albumin)    KCCQ Results:   1a. 5  1b. 2  1c. 2  2. 3  3. 3  4. 2  5. 1  6. 2  7. 1  8a. 2  8b. 2  8c. 3    Preliminary STS Risk Score: 2.7 %  NYHA Class: III    Patient was scheduled for TAVR CT today following office visit and additionally had an add on BMP as that is required within 30 days of TAVR CT. Labs were reviewed with Dr. Parker as note states below.    Called patient and left a voicemail asking for a callback to review abnormal kidney labs and medication changes per Dr. Parker. Will call patient again tomorrow to review. Callback phone number provided.      Alie Smith RN  Structural Heart Coordinator  [facility]    Thank you for allowing me to participate in the care of your patient.      Sincerely,     Nora Parker MD     [facility]  cc:   Nayeli Castorena PA-C  [address] [place] [id number]

## 2022-06-09 NOTE — PATIENT INSTRUCTIONS
Your blood pressure was elevated at your appointment today.  Elevated blood pressure can increase your risk of a heart attack, stroke and heart failure.  For this reason, we feel it is important to monitor your blood pressure closely.  If you have access to a home blood pressure monitor or are able to check your blood pressure at a local pharmacy in the next week we would like you to do so and call our clinic with those readings. Please call [phone] (Avani) and leave a message with your name, date of birth, blood pressure reading, date and location it was completed. If your blood pressure remains elevated your care team will be notified.  We appreciate being a part of your healthcare team and look forward to hearing from you soon.

## 2022-06-09 NOTE — PROGRESS NOTES
CARDIOLOGY VALVE CLINIC CONSULT    REASON FOR CONSULT: severe aortic stenosis    PRIMARY CARE PHYSICIAN:  Nayeli Castorena    HISTORY OF PRESENT ILLNESS:    Kavitha Headley is a very nice 76 year old woman here for evaluation of severe aortic stenosis which was diagnosed when she was hospitalized in March 2022.  She has a pertinent history of alcohol use disorder and is recently completed a treatment program, mild coronary disease on angiogram in 2015, mildly dilated ascending aorta, paroxysmal supraventricular tachycardia and PACs, hypertension, and dyslipidemia.    She is here today with her daughter, Nery who is supportive.    She is severely short of breath, even with activities of daily. Her symptoms have worsened over about a year. A yr ago she was working out at a gym doing water walking.  She also gets chest heaviness with activity and orthostatic lightheadedness.  She has not had syncope.  She recently was treated with a prednisone burst for arthritis in her right hand and ankle and the arthritis symptoms have resolved.  She has not been on long-term prednisone.    She states she has been sober from alcohol since March 3rd, though chart indicates maybe the date was 5/17/22.  She is a lifelong nonsmoker.     History of adverse reaction to anesthesia or abnormal airway: no  History of bleeding diathesis: denies  Contrast allergy: no     Pathophysiology and natural history of severe arctic stenosis were reviewed.  Options for management including medical therapy which is not curative versus transcatheter or surgical aortic valve replacement and risks and benefits were discussed.    PAST MEDICAL HISTORY:  Past Medical History:   Diagnosis Date     Alcohol abuse      Anxiety disorder      Ascending aorta dilatation (H)      Bariatric surgery status 1996?    gastric bypass, [facility] and     Benign hypertension      Chronic insomnia      Chronic pain syndrome     Chronic back and neck pain, chronic pain due  to osteoarthritis multiple joints     Coronary artery disease involving native coronary artery of native heart without angina pectoris 10/16/2018    Minimal coronary artery disease on coronary angiogram in 2015.      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     Major depressive disorder, single episode, severe, without mention of psychotic behavior      Mixed hyperlipidemia      Pelvic relaxation disorder     Surgical intervention for cystocele/rectocele 3,11/2012     Personal history of urinary calculi 06/2006    left ureteral stone,lithotripsy     Psoriasis      PVC (premature ventricular contraction)      Severe aortic stenosis      Spinal stenosis      Stage III chronic kidney disease (H) 2005     SVT (supraventricular tachycardia) (H)     likely atrial tachycardia       MEDICATIONS:  Current Outpatient Medications   Medication     acamprosate (CAMPRAL) 333 MG EC tablet     acetaminophen (TYLENOL) 500 MG tablet     albuterol (PROAIR HFA/PROVENTIL HFA/VENTOLIN HFA) 108 (90 Base) MCG/ACT inhaler     aspirin 81 MG EC tablet     atenolol (TENORMIN) 25 MG tablet     citalopram (CELEXA) 20 MG tablet     diclofenac (VOLTAREN) 50 MG EC tablet     folic acid (FOLVITE) 1 MG tablet     furosemide (LASIX) 20 MG tablet     gabapentin (NEURONTIN) 300 MG capsule     hydrOXYzine (ATARAX) 25 MG tablet     ipratropium - albuterol 0.5 mg/2.5 mg/3 mL (DUONEB) 0.5-2.5 (3) MG/3ML neb solution     methocarbamol (ROBAXIN) 500 MG tablet     mirtazapine (REMERON) 15 MG tablet     MISC NATURAL PRODUCTS PO     MISC NATURAL PRODUCTS PO     mometasone-formoterol (DULERA) 200-5 MCG/ACT inhaler     Multiple Vitamins-Minerals (CENTRUM SILVER) per tablet     polyethylene glycol (MIRALAX) 17  GM/Dose powder     pravastatin (PRAVACHOL) 20 MG tablet     QUEtiapine (SEROQUEL XR) 150 MG TB24 24 hr tablet     QUEtiapine (SEROQUEL) 50 MG tablet     senna-docusate (SENOKOT-S/PERICOLACE) 8.6-50 MG tablet     traZODone (DESYREL) 100 MG tablet     valACYclovir (VALTREX) 1000 mg tablet     MISC NATURAL PRODUCTS PO     MISC NATURAL PRODUCTS PO     No current facility-administered medications for this visit.       ALLERGIES:  Allergies   Allergen Reactions     Bactrim [Sulfamethoxazole W/Trimethoprim] Hives     Codeine Itching     NAUSEA     Morphine Itching     NAUSEA       SOCIAL HISTORY:  I have reviewed this patient's social history and updated it with pertinent information if needed. Kavitha Headley  reports that she has never smoked. She has never used smokeless tobacco. She reports previous alcohol use of about 63.0 standard drinks of alcohol per week. She reports that she does not use drugs.    FAMILY HISTORY:  I have reviewed this patient's family history and updated it with pertinent information if needed.   Family History   Problem Relation Age of Onset     Substance Abuse Father      Cancer Father         throat and lung mets     Diabetes No family hx of      Coronary Artery Disease No family hx of      Cerebrovascular Disease No family hx of        REVIEW OF SYSTEMS:  Skin:  Positive for pigmentation;itching   Eyes:  Negative glasses  ENT:  Negative    Respiratory:  Positive for dyspnea on exertion;wheezing  Cardiovascular:    Positive for;fatigue;edema;heaviness;lightheadedness  Gastroenterology: Negative    Genitourinary:  Negative    Musculoskeletal:  Positive for arthritis;foot pain;back pain  Neurologic:  Positive for numbness or tingling of hands  Psychiatric:  Positive for anxiety (4-5 hrs per night)  Heme/Lymph/Imm:  not assessed allergies  Endocrine:  Negative      PHYSICAL EXAM:      BP: 121/63 Pulse: 69            Vital Signs with Ranges  Pulse:  [69] 69  BP: (121)/(63) 121/63  188 lbs 3.2  oz    Constitutional: awake, alert, no distress  Eyes: sclera nonicteric  ENT: trachea midline  Respiratory: Clear to auscultation bilaterally  Cardiovascular: II/VI crescendo decrescendo systolic murmur loudest at the right upper sternal border  GI: nondistended, nontender, bowel sounds present  Skin: dry, no rash no edema  Musculoskeletal: grossly normal muscle bulk and tone  Neurologic: no focal deficits  Neuropsychiatric: appropriate affact      Review of Systems:  Skin:  Positive for pigmentation;itching   Eyes:  Negative glasses  ENT:  Negative    Respiratory:  Positive for dyspnea on exertion;wheezing  Cardiovascular:    Positive for;fatigue;edema;heaviness;lightheadedness  Gastroenterology: Negative    Genitourinary:  Negative    Musculoskeletal:  Positive for arthritis;foot pain;back pain  Neurologic:  Positive for numbness or tingling of hands  Psychiatric:  Positive for anxiety (4-5 hrs per night)  Heme/Lymph/Imm:  not assessed allergies  Endocrine:  Negative         DATA:   LAST CHOLESTEROL:  Lab Results   Component Value Date    CHOL 218 10/20/2021    CHOL 300 03/03/2021     Lab Results   Component Value Date    HDL 86 10/20/2021     03/03/2021     Lab Results   Component Value Date    LDL 85 10/20/2021     03/03/2021     Lab Results   Component Value Date    TRIG 234 10/20/2021    TRIG 77 03/03/2021     Lab Results   Component Value Date    CHOLHDLRATIO 1.5 07/28/2015       LAST BMP:  Lab Results   Component Value Date     03/24/2022     05/12/2021      Lab Results   Component Value Date    POTASSIUM 3.7 03/24/2022    POTASSIUM 4.0 05/12/2021     Lab Results   Component Value Date    CHLORIDE 105 03/24/2022    CHLORIDE 107 05/12/2021     Lab Results   Component Value Date    BIRGIT 8.9 03/24/2022    BIRGIT 9.4 05/12/2021     Lab Results   Component Value Date    CO2 29 03/24/2022    CO2 30 05/12/2021     Lab Results   Component Value Date    BUN 8 03/24/2022    BUN 15 05/12/2021      Lab Results   Component Value Date    CR 1.05 03/24/2022    CR 1.00 05/12/2021     Lab Results   Component Value Date    GLC 75 03/24/2022     05/12/2021       LAST CBC:  Lab Results   Component Value Date    WBC 5.9 03/21/2022    WBC 5.6 03/27/2021     Lab Results   Component Value Date    RBC 2.88 03/21/2022    RBC 3.64 03/27/2021     Lab Results   Component Value Date    HGB 10.0 03/24/2022    HGB 10.7 03/27/2021     Lab Results   Component Value Date    HCT 27.8 03/21/2022    HCT 33.4 03/27/2021     Lab Results   Component Value Date    MCV 97 03/21/2022    MCV 92 03/27/2021     Lab Results   Component Value Date    MCH 28.8 03/21/2022    MCH 29.4 03/27/2021     Lab Results   Component Value Date    MCHC 29.9 03/21/2022    MCHC 32.0 03/27/2021     Lab Results   Component Value Date    RDW 16.1 03/21/2022    RDW 15.7 03/27/2021     Lab Results   Component Value Date     03/21/2022     03/27/2021     liver function tests  Liver Function Studies - Recent Labs   Lab Test 03/10/22  1454   PROTTOTAL 6.8   ALBUMIN 3.0*   BILITOTAL 0.7   ALKPHOS 111   AST 41   ALT 23         EKG I personally reviewed: Baseline artifact normal sinus rhythm (p-waves most visible in V3), inferior infarct, possible anterior infarct     Echo 3/12/22 images were reviewed.  : Interpretation Summary 1. The left ventricle is normal in size. Left ventricular systolic function isnormal. The visual ejection fraction is 60-65%. No regional wall motionabnormalities noted. There is no thrombus seen in the left ventricle.2. The right ventricle is normal size. The right ventricular systolic functionis normal.3. The left atrium is moderate to severely dilated4. Severe valvular aortic stenosis. The mean AoV pressure gradient is 48.0mmHg. The peak AoV pressure gradient is 83.8 mmHg. The calculated aortic valveare is 0.97 cm^2.5. No pericardial effusion.6. In comparison to the previous report dated 10/20/2021, there has been  aninterval increase in transaortic gradients.    There is mild (1+) tricuspid regurgitation. The right ventricular systolic  pressure is approximated at 56.5 mmHg plus the right atrial pressure. Right  ventricular systolic pressure is elevated, consistent with moderate to severe  pulmonary hypertension.        ASSESSMENT:  1. Severe symptomatic aortic stenosis.  Mean gradient 48, peak velocity 84 mmHg, estimated aortic valve area less than 1 cm .  NYHA class II  STS risk estimate 2.7  2. Moderate to severe pulm HTN by echo 3/11/22 RVSP 56 + RAP  3. HTN  4. Dyslipidemia LDL 85 10/20/21, takes pravastatin.  History of liver disease due to alcohol.  5. Alcohol dependence, in remission      Cor angio 2015  Mild CAD    RECOMMENDATIONS:  1. Schedule coronary angigram with possible PCI and right heart catheterization  2. TAVR CT  3. She met with cardiac surgeon Dr. Dumont in clinic today please see his separate note but briefly he concurs with planning for likely transcatheter aortic valve and patient preliminarily requests full bailout.    Risks and benefits of the procedure were discussed with the patient in detail that includes but not limited to the risk of stroke, heart attack, death, cardiac or vascular perforation, emergent stenting or bypass, contrast induced allergic reaction, renal dysfunction (including risks of temporary or permanent dialysis), risk of respiratory depression with sedation, and vascular complications (including bleeding and transfusion). Patient denies any major active bleeding issues and is willing to take and comply with dual antiplatelet therapy and understands associated bleeding risks. Patient understands the overall risks of the procedure and wishes to proceed.    Addendum  Labs today show renal failure,  Cr 1.8, BUN  Recommend:   -stop lasix  -stop atenolol  -start metoprolol succinate 25mg PO daily.  -Recheck BMP in 4-5 days.     Last Comprehensive Metabolic Panel:  Sodium   Date Value Ref  Range Status   06/09/2022 136 133 - 144 mmol/L Final   05/12/2021 140 133 - 144 mmol/L Final     Potassium   Date Value Ref Range Status   06/09/2022 4.4 3.4 - 5.3 mmol/L Final   05/12/2021 4.0 3.4 - 5.3 mmol/L Final     Chloride   Date Value Ref Range Status   06/09/2022 103 94 - 109 mmol/L Final   05/12/2021 107 94 - 109 mmol/L Final     Carbon Dioxide   Date Value Ref Range Status   05/12/2021 30 20 - 32 mmol/L Final     Carbon Dioxide (CO2)   Date Value Ref Range Status   06/09/2022 27 20 - 32 mmol/L Final     Anion Gap   Date Value Ref Range Status   06/09/2022 6 3 - 14 mmol/L Final   05/12/2021 3 3 - 14 mmol/L Final     Glucose   Date Value Ref Range Status   06/09/2022 103 (H) 70 - 99 mg/dL Final   05/12/2021 104 (H) 70 - 99 mg/dL Final     Urea Nitrogen   Date Value Ref Range Status   06/09/2022 48 (H) 7 - 30 mg/dL Final   05/12/2021 15 7 - 30 mg/dL Final     Creatinine   Date Value Ref Range Status   06/09/2022 1.79 (H) 0.52 - 1.04 mg/dL Final   05/12/2021 1.00 0.52 - 1.04 mg/dL Final     GFR Estimate   Date Value Ref Range Status   06/09/2022 29 (L) >60 mL/min/1.73m2 Final     Comment:     Effective December 21, 2021 eGFRcr in adults is calculated using the 2021 CKD-EPI creatinine equation which includes age and gender (Rajesh et al., NEJM, DOI: 10.1056/TLTBkt0967848)   05/12/2021 54 (L) >60 mL/min/[1.73_m2] Final     Comment:     Non  GFR Calc  Starting 12/18/2018, serum creatinine based estimated GFR (eGFR) will be   calculated using the Chronic Kidney Disease Epidemiology Collaboration   (CKD-EPI) equation.       Calcium   Date Value Ref Range Status   06/09/2022 9.5 8.5 - 10.1 mg/dL Final   05/12/2021 9.4 8.5 - 10.1 mg/dL Final           Nora Parker MD [facility]  Text Page

## 2022-06-10 ENCOUNTER — TELEPHONE (OUTPATIENT)
Dept: CARDIOLOGY | Facility: CLINIC | Age: 77
End: 2022-06-10
Payer: COMMERCIAL

## 2022-06-10 DIAGNOSIS — I10 BENIGN ESSENTIAL HYPERTENSION: Primary | ICD-10-CM

## 2022-06-10 DIAGNOSIS — N18.30 STAGE 3 CHRONIC KIDNEY DISEASE, UNSPECIFIED WHETHER STAGE 3A OR 3B CKD (H): ICD-10-CM

## 2022-06-10 RX ORDER — METOPROLOL SUCCINATE 25 MG/1
25 TABLET, EXTENDED RELEASE ORAL DAILY
Qty: 90 TABLET | Refills: 3 | Status: SHIPPED | OUTPATIENT
Start: 2022-06-10 | End: 2022-08-11

## 2022-06-10 NOTE — TELEPHONE ENCOUNTER
Nery, patients daughter, returned voicemail left by WILMA Strange. Writer spoke with both Nery and Linda regarding BMP results and Dr Parker's recommendations.    Provided education to Linda and Nery on new medication recommendation by Dr Parker and current medications Linda is taking.    Linda states she takes ibuprofen and other NSAID's frequently for back and other arthritic pain. Educated Linda on NSAID's and interaction with kidney's. Provided information on alternative pain relief.    Linda reports she becomes very SOB and swollen when she does not take furosemide (LASIX). Reassured Linda that Alie or REINA would be in touch with her on Monday to reassess her symptoms and check in.    Placed orders for metoprolol succinate and BMP recheck. Scheduled lab appointment.    Linda and Nery verbalized understanding and agreement with above information. Provided contact information.    Gabrielle Garnett RN  Structural Heart Coordinator  [facility]

## 2022-06-10 NOTE — PROGRESS NOTES
TAVR Coordinator visit:  Provided additional education regarding TAVR procedure, after being present for discussion with physician. Explained the work-up process and next steps for patient. Patient provided our direct contact number and instructed to call with any questions.     Completed frailty testing and KCCQ.   5 meter walk: 4.6  Frailty score: 1/5 (Albumin)    KCCQ Results:   1a. 5  1b. 2  1c. 2  2. 3  3. 3  4. 2  5. 1  6. 2  7. 1  8a. 2  8b. 2  8c. 3    Preliminary STS Risk Score: 2.7 %  NYHA Class: III    Patient was scheduled for TAVR CT today following office visit and additionally had an add on BMP as that is required within 30 days of TAVR CT. Labs were reviewed with Dr. Parker as note states below.    Called patient and left a voicemail asking for a callback to review abnormal kidney labs and medication changes per Dr. Parker. Will call patient again tomorrow to review. Callback phone number provided.      Alie Smith RN  Structural Heart Coordinator  [facility]

## 2022-06-10 NOTE — TELEPHONE ENCOUNTER
"Called and left a message with patient again this AM asking for a callback to review lab results that occur after yesterday's office visit. Dr. Parker reviewed labs and is recommending the below information.    Additionally called and left a message with patient's daughter, Nery, asking for a callback as well so we can try to make these changes today.     Callback phone number was provided on both voicemails.      Per Dr. Parker:  \"Addendum  Labs today show renal failure,  Cr 1.8, BUN  Recommend:   -stop lasix  -stop atenolol  -start metoprolol succinate 25mg PO daily.  -Recheck BMP in 4-5 days\"  "

## 2022-06-13 NOTE — TELEPHONE ENCOUNTER
Called patient and left brief voicemail checking in how she was feeling after medications changes were made on Friday, 06/10/2022 following abnormal labs on 06/09/2022. Informed patient will review her labs with Dr. Parker once redrawn on 06/15/2022 but encouraged patient to call back with any concerns of increased swelling/edema, weight gain, or SOB. Direct callback phone number provided.       Addendum: Patient called the heart clinic back stated she is feeling the same as she wast last week. Denied any swelling or weight gain. Reviewed lab appointment date and time with patient and encouraged her to call with additional concerns.

## 2022-06-15 ENCOUNTER — TELEPHONE (OUTPATIENT)
Dept: BEHAVIORAL HEALTH | Facility: CLINIC | Age: 77
End: 2022-06-15

## 2022-06-15 ENCOUNTER — LAB (OUTPATIENT)
Dept: LAB | Facility: CLINIC | Age: 77
End: 2022-06-15
Payer: COMMERCIAL

## 2022-06-15 DIAGNOSIS — N18.30 STAGE 3 CHRONIC KIDNEY DISEASE, UNSPECIFIED WHETHER STAGE 3A OR 3B CKD (H): ICD-10-CM

## 2022-06-15 DIAGNOSIS — F51.04 CHRONIC INSOMNIA: ICD-10-CM

## 2022-06-15 LAB
ANION GAP SERPL CALCULATED.3IONS-SCNC: 6 MMOL/L (ref 3–14)
BUN SERPL-MCNC: 18 MG/DL (ref 7–30)
CALCIUM SERPL-MCNC: 9.5 MG/DL (ref 8.5–10.1)
CHLORIDE BLD-SCNC: 103 MMOL/L (ref 94–109)
CO2 SERPL-SCNC: 28 MMOL/L (ref 20–32)
CREAT SERPL-MCNC: 1.14 MG/DL (ref 0.52–1.04)
GFR SERPL CREATININE-BSD FRML MDRD: 49 ML/MIN/1.73M2
GLUCOSE BLD-MCNC: 147 MG/DL (ref 70–99)
POTASSIUM BLD-SCNC: 4.2 MMOL/L (ref 3.4–5.3)
SODIUM SERPL-SCNC: 137 MMOL/L (ref 133–144)

## 2022-06-15 PROCEDURE — 36415 COLL VENOUS BLD VENIPUNCTURE: CPT | Performed by: INTERNAL MEDICINE

## 2022-06-15 PROCEDURE — 80048 BASIC METABOLIC PNL TOTAL CA: CPT | Performed by: INTERNAL MEDICINE

## 2022-06-15 NOTE — TELEPHONE ENCOUNTER
Reason for call:  Medication   If this is a refill request, has the caller requested the refill from the pharmacy already? Yes  Will the patient be using a [facility]? No  Name of the pharmacy and phone number for the current request: [facility] in [place]    Name of the medication requested: Seroquil taking 3 at night need to reformulate script  QUEtiapine (SEROQUEL XR) 150 MG TB24 24 hr tablet  QUEtiapine (SEROQUEL) 50 MG tablet    Is it possible to have 100 mg pills instead?    Other request: talked with pharmacy in May    Phone number to reach patient:  Cell number on file:    Telephone Information:   Mobile [phone]       Best Time:  Anytime    Can we leave a detailed message on this number?  YES    Travel screening: Not Applicable

## 2022-06-16 ENCOUNTER — TELEPHONE (OUTPATIENT)
Dept: CARDIOLOGY | Facility: CLINIC | Age: 77
End: 2022-06-16
Payer: COMMERCIAL

## 2022-06-16 DIAGNOSIS — I25.10 CORONARY ARTERY DISEASE INVOLVING NATIVE CORONARY ARTERY OF NATIVE HEART WITHOUT ANGINA PECTORIS: Primary | ICD-10-CM

## 2022-06-16 RX ORDER — POTASSIUM CHLORIDE 1500 MG/1
20 TABLET, EXTENDED RELEASE ORAL
Status: CANCELLED | OUTPATIENT
Start: 2022-06-16

## 2022-06-16 RX ORDER — LIDOCAINE 40 MG/G
CREAM TOPICAL
Status: CANCELLED | OUTPATIENT
Start: 2022-06-16

## 2022-06-16 RX ORDER — ASPIRIN 81 MG/1
243 TABLET, CHEWABLE ORAL ONCE
Status: CANCELLED | OUTPATIENT
Start: 2022-06-16

## 2022-06-16 RX ORDER — ASPIRIN 325 MG
325 TABLET ORAL ONCE
Status: CANCELLED | OUTPATIENT
Start: 2022-06-16 | End: 2022-06-16

## 2022-06-16 RX ORDER — SODIUM CHLORIDE 9 MG/ML
INJECTION, SOLUTION INTRAVENOUS CONTINUOUS
Status: CANCELLED | OUTPATIENT
Start: 2022-06-16

## 2022-06-16 NOTE — TELEPHONE ENCOUNTER
Reviewed patient's repeat BMP labs with Dr. Parker who stated is fine to continue to plan TAVR workup plan of coronary angiogram on 2022.        [facility] - ANGIOGRAM INSTRUCTIONS:  - Kavitha Headley is scheduled for a coronary angiogram at [facility] on 2022. Check in time is at 0730, scheduled as 0930 case.  - Advised patient not eat or drink after midnight on day of procedure.   - Patient advised to take morning medications with a sip of water on the day of the procedure, noting the followin. Aspirin: Patient is currently taking 81mg of aspirin, patient instructed to take 4 tabs (324mg) of aspirin the morning of the procedure.  2. Diabetic Medications: Patient does not take Metformin or other diabetic medications.  3. Anticoagulants: Patient does not take an anticoagulant.  4. Diuretics: Patient does not take diuretics. Furosemide was stopped on 2022 by Dr. Parker.   5. All vitamins and supplements to be held the morning of the procedure.  - Verified patient does not have an allergy to contrast dye.  - Verified patient has someone available to drive them home from the hospital and can stay with them for 24 hours after the procedure. They understand that one visitor may accompany them into the hospital on the day of angiogram, with both patient and visitor to wear a mask.  - COVID testin2022  - Patient was instructed to take their temperature the morning of procedure and if temperature is >100 degrees F patient should not come in and call [phone].  - Angiogram orders have been signed & held.    Alie Smith RN  [facility]

## 2022-06-16 NOTE — TELEPHONE ENCOUNTER
Pt reports she has not stopped taking Seroquel  mg (2 tabs to equal 300 mg) and has been taking it consistently. Asked pt if another provider had prescribed it to her at all since it was prescribed by Dr. Ackerman on 2/2/2022 with only 1 refill. Pt reports she can't remember but states that she was in [facility] for 30 days approximately 6 weeks ago where they dispensed the medication from their own supply in house.     Asked pt when she started taking two tablets of Seroquel 50 mg (100 mg) at bedtime prn vs the prescribed 1 tablet. Pt reports that a provider in [facility] increased that dosage but she doesn't remember the name of the provider. Pt denies side effects of meds.    Called [facility] pharmacy. [facility] staff reports pt last picked up Seroquel  mg on 5/23/2022 and sig indicates take 2 tabs by mouth at bedtime.     Pharmacy staff also indicate that pt last picked up Seroquel 50 mg on 5/13/2022 and that sig indicates to only take 1 tab at bedtime, not 2 which pt reports she's been taking since [facility] tx.    Pt now has follow up appt scheduled for 7/15/2022 with Dr. Ackerman but reports she needs her refills now.     Pt would like medications sent to [facility] in [place] on [address]    Routing to Addiction Med provider pool for consideration.

## 2022-06-16 NOTE — TELEPHONE ENCOUNTER
Pt called intake back. Followup visit scheduled for 7/15/22. Pt reported they are still taking 2 tablets (300 mg) by mouth At Bedtime and now taking 2 tablets (50 mg) by mouth nightly as needed, instead of 1.

## 2022-06-16 NOTE — TELEPHONE ENCOUNTER
See attached refill request on this encounter. Pt last seen by Dr. Ackerman on 2/2/2022 and cancelled appt on 3/2/2022. Does not have follow up appt scheduled.     Call made to pt to request that she schedule follow up appt and to inquire about how she's taking Seroquel. No answer. LVM requesting call back. Seroquel 50 mg last prescribed 9/29/2021 with 8 refills. Seroquel  mg last prescribed 2/2/2022 with 1 refill.    Per chart review, these notes entered on 2/18/2022:        Routing to covering provider pool for guidance regarding how to proceed with refill request and if pt needs to be seen prior to further refills

## 2022-06-17 RX ORDER — QUETIAPINE 150 MG/1
300 TABLET, FILM COATED, EXTENDED RELEASE ORAL AT BEDTIME
Qty: 60 TABLET | Refills: 0 | Status: SHIPPED | OUTPATIENT
Start: 2022-06-17 | End: 2022-07-15

## 2022-06-17 RX ORDER — QUETIAPINE FUMARATE 50 MG/1
50 TABLET, FILM COATED ORAL
Qty: 30 TABLET | Refills: 0 | Status: SHIPPED | OUTPATIENT
Start: 2022-06-17 | End: 2022-07-15 | Stop reason: DRUGHIGH

## 2022-06-17 NOTE — TELEPHONE ENCOUNTER
Provided 1 month refill for her for each prescription to bridge her until her follow up with provider.

## 2022-06-18 ENCOUNTER — LAB (OUTPATIENT)
Dept: URGENT CARE | Facility: URGENT CARE | Age: 77
End: 2022-06-18
Payer: COMMERCIAL

## 2022-06-18 DIAGNOSIS — Z20.822 ENCOUNTER FOR LABORATORY TESTING FOR COVID-19 VIRUS: Primary | ICD-10-CM

## 2022-06-18 PROCEDURE — U0005 INFEC AGEN DETEC AMPLI PROBE: HCPCS

## 2022-06-18 PROCEDURE — U0003 INFECTIOUS AGENT DETECTION BY NUCLEIC ACID (DNA OR RNA); SEVERE ACUTE RESPIRATORY SYNDROME CORONAVIRUS 2 (SARS-COV-2) (CORONAVIRUS DISEASE [COVID-19]), AMPLIFIED PROBE TECHNIQUE, MAKING USE OF HIGH THROUGHPUT TECHNOLOGIES AS DESCRIBED BY CMS-2020-01-R: HCPCS

## 2022-06-19 LAB — SARS-COV-2 RNA RESP QL NAA+PROBE: NEGATIVE

## 2022-06-20 ENCOUNTER — HOSPITAL ENCOUNTER (OUTPATIENT)
Facility: CLINIC | Age: 77
Discharge: HOME | End: 2022-06-20
Admitting: INTERNAL MEDICINE
Payer: COMMERCIAL

## 2022-06-20 VITALS
HEART RATE: 74 BPM | DIASTOLIC BLOOD PRESSURE: 90 MMHG | OXYGEN SATURATION: 98 % | WEIGHT: 186.5 LBS | SYSTOLIC BLOOD PRESSURE: 140 MMHG | TEMPERATURE: 97.9 F | HEIGHT: 64 IN | BODY MASS INDEX: 31.84 KG/M2 | RESPIRATION RATE: 18 BRPM

## 2022-06-20 DIAGNOSIS — I35.0 SEVERE AORTIC STENOSIS: ICD-10-CM

## 2022-06-20 DIAGNOSIS — I25.10 CORONARY ARTERY DISEASE INVOLVING NATIVE CORONARY ARTERY OF NATIVE HEART WITHOUT ANGINA PECTORIS: ICD-10-CM

## 2022-06-20 PROBLEM — Z98.890 STATUS POST CORONARY ANGIOGRAM: Status: ACTIVE | Noted: 2022-06-20

## 2022-06-20 LAB
ANION GAP SERPL CALCULATED.3IONS-SCNC: 4 MMOL/L (ref 3–14)
APTT PPP: 28 SECONDS (ref 22–38)
BUN SERPL-MCNC: 24 MG/DL (ref 7–30)
CALCIUM SERPL-MCNC: 9.2 MG/DL (ref 8.5–10.1)
CHLORIDE BLD-SCNC: 104 MMOL/L (ref 94–109)
CO2 SERPL-SCNC: 27 MMOL/L (ref 20–32)
CREAT SERPL-MCNC: 1.42 MG/DL (ref 0.52–1.04)
ERYTHROCYTE [DISTWIDTH] IN BLOOD BY AUTOMATED COUNT: 16.1 % (ref 10–15)
GFR SERPL CREATININE-BSD FRML MDRD: 38 ML/MIN/1.73M2
GLUCOSE BLD-MCNC: 97 MG/DL (ref 70–99)
HCO3 BLDV-SCNC: 25 MMOL/L (ref 21–28)
HCO3 BLDV-SCNC: 29 MMOL/L (ref 21–28)
HCT VFR BLD AUTO: 29.4 % (ref 35–47)
HGB BLD-MCNC: 9.3 G/DL (ref 11.7–15.7)
INR PPP: 0.98 (ref 0.85–1.15)
LACTATE BLD-SCNC: <0.3 MMOL/L
LACTATE BLD-SCNC: <0.3 MMOL/L
MCH RBC QN AUTO: 28.4 PG (ref 26.5–33)
MCHC RBC AUTO-ENTMCNC: 31.6 G/DL (ref 31.5–36.5)
MCV RBC AUTO: 90 FL (ref 78–100)
PCO2 BLDV: 41 MM HG (ref 40–50)
PCO2 BLDV: 56 MM HG (ref 40–50)
PH BLDV: 7.32 [PH] (ref 7.32–7.43)
PH BLDV: 7.4 [PH] (ref 7.32–7.43)
PLATELET # BLD AUTO: 331 10E3/UL (ref 150–450)
PO2 BLDV: 103 MM HG (ref 25–47)
PO2 BLDV: 34 MM HG (ref 25–47)
POTASSIUM BLD-SCNC: 3.9 MMOL/L (ref 3.4–5.3)
RBC # BLD AUTO: 3.28 10E6/UL (ref 3.8–5.2)
SAO2 % BLDV: 58 % (ref 94–100)
SAO2 % BLDV: 98 % (ref 94–100)
SODIUM SERPL-SCNC: 135 MMOL/L (ref 133–144)
WBC # BLD AUTO: 5.7 10E3/UL (ref 4–11)

## 2022-06-20 PROCEDURE — 99152 MOD SED SAME PHYS/QHP 5/>YRS: CPT | Performed by: INTERNAL MEDICINE

## 2022-06-20 PROCEDURE — C1894 INTRO/SHEATH, NON-LASER: HCPCS | Performed by: INTERNAL MEDICINE

## 2022-06-20 PROCEDURE — 85610 PROTHROMBIN TIME: CPT | Performed by: INTERNAL MEDICINE

## 2022-06-20 PROCEDURE — 250N000011 HC RX IP 250 OP 636: Performed by: INTERNAL MEDICINE

## 2022-06-20 PROCEDURE — 272N000001 HC OR GENERAL SUPPLY STERILE: Performed by: INTERNAL MEDICINE

## 2022-06-20 PROCEDURE — 999N000184 HC STATISTIC TELEMETRY

## 2022-06-20 PROCEDURE — 999N000054 HC STATISTIC EKG NON-CHARGEABLE

## 2022-06-20 PROCEDURE — 93005 ELECTROCARDIOGRAM TRACING: CPT

## 2022-06-20 PROCEDURE — 83605 ASSAY OF LACTIC ACID: CPT

## 2022-06-20 PROCEDURE — 250N000013 HC RX MED GY IP 250 OP 250 PS 637: Performed by: INTERNAL MEDICINE

## 2022-06-20 PROCEDURE — 80048 BASIC METABOLIC PNL TOTAL CA: CPT | Performed by: INTERNAL MEDICINE

## 2022-06-20 PROCEDURE — 93010 ELECTROCARDIOGRAM REPORT: CPT | Performed by: INTERNAL MEDICINE

## 2022-06-20 PROCEDURE — 82803 BLOOD GASES ANY COMBINATION: CPT | Mod: 91

## 2022-06-20 PROCEDURE — 99153 MOD SED SAME PHYS/QHP EA: CPT | Performed by: INTERNAL MEDICINE

## 2022-06-20 PROCEDURE — 258N000003 HC RX IP 258 OP 636: Performed by: INTERNAL MEDICINE

## 2022-06-20 PROCEDURE — 93456 R HRT CORONARY ARTERY ANGIO: CPT | Mod: 26 | Performed by: INTERNAL MEDICINE

## 2022-06-20 PROCEDURE — 36415 COLL VENOUS BLD VENIPUNCTURE: CPT | Performed by: INTERNAL MEDICINE

## 2022-06-20 PROCEDURE — 85027 COMPLETE CBC AUTOMATED: CPT | Performed by: INTERNAL MEDICINE

## 2022-06-20 PROCEDURE — 93456 R HRT CORONARY ARTERY ANGIO: CPT | Performed by: INTERNAL MEDICINE

## 2022-06-20 PROCEDURE — 36591 DRAW BLOOD OFF VENOUS DEVICE: CPT

## 2022-06-20 PROCEDURE — 250N000009 HC RX 250: Performed by: INTERNAL MEDICINE

## 2022-06-20 PROCEDURE — 999N000071 HC STATISTIC HEART CATH LAB OR EP LAB

## 2022-06-20 PROCEDURE — 85730 THROMBOPLASTIN TIME PARTIAL: CPT | Performed by: INTERNAL MEDICINE

## 2022-06-20 RX ORDER — ASPIRIN 81 MG/1
243 TABLET, CHEWABLE ORAL ONCE
Status: DISCONTINUED | OUTPATIENT
Start: 2022-06-20 | End: 2022-06-20 | Stop reason: HOSPADM

## 2022-06-20 RX ORDER — POTASSIUM CHLORIDE 1500 MG/1
20 TABLET, EXTENDED RELEASE ORAL
Status: COMPLETED | OUTPATIENT
Start: 2022-06-20 | End: 2022-06-20

## 2022-06-20 RX ORDER — SODIUM CHLORIDE 9 MG/ML
INJECTION, SOLUTION INTRAVENOUS CONTINUOUS
Status: DISCONTINUED | OUTPATIENT
Start: 2022-06-20 | End: 2022-06-20 | Stop reason: HOSPADM

## 2022-06-20 RX ORDER — LIDOCAINE 40 MG/G
CREAM TOPICAL
Status: DISCONTINUED | OUTPATIENT
Start: 2022-06-20 | End: 2022-06-20 | Stop reason: HOSPADM

## 2022-06-20 RX ORDER — FLUMAZENIL 0.1 MG/ML
0.2 INJECTION, SOLUTION INTRAVENOUS
Status: DISCONTINUED | OUTPATIENT
Start: 2022-06-20 | End: 2022-06-20 | Stop reason: HOSPADM

## 2022-06-20 RX ORDER — NALOXONE HYDROCHLORIDE 0.4 MG/ML
0.4 INJECTION, SOLUTION INTRAMUSCULAR; INTRAVENOUS; SUBCUTANEOUS
Status: DISCONTINUED | OUTPATIENT
Start: 2022-06-20 | End: 2022-06-20 | Stop reason: HOSPADM

## 2022-06-20 RX ORDER — NALOXONE HYDROCHLORIDE 0.4 MG/ML
0.2 INJECTION, SOLUTION INTRAMUSCULAR; INTRAVENOUS; SUBCUTANEOUS
Status: DISCONTINUED | OUTPATIENT
Start: 2022-06-20 | End: 2022-06-20 | Stop reason: HOSPADM

## 2022-06-20 RX ORDER — ACETAMINOPHEN 325 MG/1
650 TABLET ORAL EVERY 4 HOURS PRN
Status: DISCONTINUED | OUTPATIENT
Start: 2022-06-20 | End: 2022-06-20 | Stop reason: HOSPADM

## 2022-06-20 RX ORDER — ASPIRIN 325 MG
325 TABLET ORAL ONCE
Status: DISCONTINUED | OUTPATIENT
Start: 2022-06-20 | End: 2022-06-20 | Stop reason: HOSPADM

## 2022-06-20 RX ORDER — IOPAMIDOL 755 MG/ML
INJECTION, SOLUTION INTRAVASCULAR
Status: DISCONTINUED | OUTPATIENT
Start: 2022-06-20 | End: 2022-06-20 | Stop reason: HOSPADM

## 2022-06-20 RX ORDER — FENTANYL CITRATE 50 UG/ML
INJECTION, SOLUTION INTRAMUSCULAR; INTRAVENOUS
Status: DISCONTINUED | OUTPATIENT
Start: 2022-06-20 | End: 2022-06-20 | Stop reason: HOSPADM

## 2022-06-20 RX ORDER — FENTANYL CITRATE 50 UG/ML
25 INJECTION, SOLUTION INTRAMUSCULAR; INTRAVENOUS
Status: DISCONTINUED | OUTPATIENT
Start: 2022-06-20 | End: 2022-06-20 | Stop reason: HOSPADM

## 2022-06-20 RX ORDER — ATROPINE SULFATE 0.1 MG/ML
0.5 INJECTION INTRAVENOUS
Status: DISCONTINUED | OUTPATIENT
Start: 2022-06-20 | End: 2022-06-20 | Stop reason: HOSPADM

## 2022-06-20 RX ADMIN — SODIUM CHLORIDE: 9 INJECTION, SOLUTION INTRAVENOUS at 07:43

## 2022-06-20 RX ADMIN — POTASSIUM CHLORIDE 20 MEQ: 1500 TABLET, EXTENDED RELEASE ORAL at 08:57

## 2022-06-20 NOTE — DISCHARGE INSTRUCTIONS
Cardiac Angiogram Discharge Instructions - Femoral    After you go home:    Have an adult stay with you until tomorrow.  Drink extra fluids for 2 days.  You may resume your normal diet.  No smoking       For 24 hours - due to the sedation you received:  Relax and take it easy.  Do NOT make any important or legal decisions.  Do NOT drive or operate machines at home or at work.  Do NOT drink alcohol.    Care of Groin Puncture Site:    For the first 24 hrs - check the puncture site every 1-2 hours while awake.  For 2 days, when you cough, sneeze, laugh or move your bowels, hold your hand over the puncture site and press firmly.  Remove the bandaid after 24 hours. If there is minor oozing, apply another bandaid and remove it after 12 hours.  It is normal to have a small bruise or pea size lump at the site.  You may shower tomorrow. Do NOT take a bath, or use a hot tub or pool for at least 3 days. Do NOT scrub the site. Do not use lotion or powder near the puncture site.    Activity:            For 2 days:  No stooping or squatting  Do NOT do any heavy activity such as exercise, lifting, or straining.   No housework, yard work or any activity that make you sweat  Do NOT lift more than 10 pounds    Bleeding:    If you start bleeding from the site in your groin, lie down flat and press firmly on/above the site for 10 minutes.   Once bleeding stops, lay flat for 2 hours.   Call [facility] as soon as you can.       Call 911 right away if you have heavy bleeding or bleeding that does not stop.      Medicines:    If you are taking an antiplatelet medication such as Plavix, Brilinta or Effient, do not stop taking it until you talk to your cardiologist.    Take your medications, including blood thinners, unless your provider tells you not to.    If you have stopped any medicines, check with your provider about when to restart them.    Follow Up Appointments:    Follow up with [facility] Nurse Practitioner at [facility] of  patient preference in 7-10 days.    Call the clinic if:    You have increased pain or a large or growing hard lump around the site.  The site is red, swollen, hot or tender.  Blood or fluid is draining from the site.  You have chills or a fever greater than 101 F (38 C).  Your leg feels numb, cool or changes color.  You have hives, a rash or unusual itching.  New pain in the back or belly that you cannot control with Tylenol.  Any questions or concerns.          [facility] [place]:    [phone] UM (7 days a week)

## 2022-06-20 NOTE — PRE-PROCEDURE
GENERAL PRE-PROCEDURE:   Procedure:  Coronary angio, right heart cath, possible PCI  Date/Time:  6/20/2022 9:48 AM    Written consent obtained?: Yes    Risks and benefits: Risks, benefits and alternatives were discussed    Consent given by:  Patient  Patient states understanding of procedure being performed: Yes    Patient's understanding of procedure matches consent: Yes    Procedure consent matches procedure scheduled: Yes    Expected level of sedation:  Moderate  Appropriately NPO:  Yes  ASA Class:  3  Mallampati  :  Grade 1- soft palate, uvula, tonsillar pillars, and posterior pharyngeal wall visible  Lungs:  Lungs clear with good breath sounds bilaterally  Heart:  Systolic murmur  History & Physical reviewed:  History and physical reviewed and no updates needed  Statement of review:  I have reviewed the lab findings, diagnostic data, medications, and the plan for sedation  reviewed labs. She was out boating yesterday and feels that she got dehydrated. She has been avoiding NSAIDs and furosemide.  Discussed that due to tre we will hydrate, but it is possible an intervention would need to be staged and she agrees.  Nora Parker MD on 6/20/2022 at 9:50 AM

## 2022-06-20 NOTE — Clinical Note
Potential access sites were evaluated for patency using ultrasound.   The right femoral artery and right femoral vein was selected. Access was obtained under with Sonosite and Fluoroscopic guidance using a micropuncture 21 gauge needle with direct visualization of needle entry.

## 2022-06-20 NOTE — PROGRESS NOTES
Care Suites Discharge Nursing Note    Patient Information  Name: Kavitha Headley  Age: 76 year old    Discharge Education:  Discharge instructions reviewed: Yes - by Zoey DILLON  Additional education/resources provided: n/a  Patient/patient representative verbalizes understanding: Yes  Patient discharging on new medications: No  Medication education completed: N/A    Discharge Plans:   Discharge location: home  Discharge ride contacted: Yes  Approximate discharge time: 1320    Discharge Criteria:  Discharge criteria met and vital signs stable: Yes    Patient Belongs:  Patient belongings returned to patient: Yes    R) groin site covered with tegaderm, dressing CDI, area soft, no bleeding or hematoma, CMS intact. Pt has ambulated, voided, and taken PO fluid without issue. No pain or complaints at time of discharge. Discharged via wheelchair to private vehicle.     Tram Shell RN          negative...

## 2022-06-20 NOTE — PROGRESS NOTES
Care Suites Post Procedure Note    Patient Information  Name: Kavitha Headley  Age: 76 year old    Post Procedure  Time patient returned to Care Suites: 1045  Concerns/abnormal assessment: none  If abnormal assessment, provider notified: N/A  Plan/Other: kylee Frey RN

## 2022-06-20 NOTE — PROGRESS NOTES
PATIENT/VISITOR WELLNESS SCREENING    Step 1 Patient Screening    1. In the last month, have you been in contact with someone who was confirmed or suspected to have Coronavirus/COVID-19? No    2. Do you have the following symptoms?  Fever/Chills? No   Cough? No   Shortness of breath? No   New loss of taste or smell? No  Sore throat? No  Muscle or body aches? No  Headaches? No  Fatigue? No  Vomiting or diarrhea? No    Step 2 Visitor Screening    1. Name of Visitor (1 visitor per patient): Nery    2. In the last month, have you been in contact with someone who was confirmed or suspected to have Coronavirus/COVID-19? No    3. Do you have the following symptoms?  Fever/Chills? No   Cough? No   Shortness of breath? No   Skin rash? No   Loss of taste or smell? No  Sore throat? No  Runny or stuffy nose? No  Muscle or body aches? No  Headaches? No  Fatigue? No  Vomiting or diarrhea? No    If the visitor has positive symptoms, notify supervisor/manger  Per policy, the visitor will need to leave the facility     Step 3 Refer to logic grid below for actions    NO SYMPTOM(S)    ACTIONS:  1. Standard rooming process  2. Provider to assess per normal protocol  3. Implement precautions as needed and per guidelines     POSITIVE SYMPTOM(S)  If positive for ANY of the following symptoms: fever, cough, shortness of breath, rash    ACTION:  1. Continue to have the patient wear a mask   2. Room patient as soon as possible  3. Don appropriate PPE when entering room  4. Provider evaluation

## 2022-06-21 ENCOUNTER — TELEPHONE (OUTPATIENT)
Dept: CARDIOLOGY | Facility: CLINIC | Age: 77
End: 2022-06-21
Payer: COMMERCIAL

## 2022-06-21 DIAGNOSIS — N18.30 STAGE 3 CHRONIC KIDNEY DISEASE, UNSPECIFIED WHETHER STAGE 3A OR 3B CKD (H): Primary | ICD-10-CM

## 2022-06-21 NOTE — TELEPHONE ENCOUNTER
Patient called and talked with  Petrona and was able to get scheduled for repeat BMP on 06/23. Patient had additional questions so called patient back again and was able to help answer her questions regarding a timeline for the rest of her TAVR workup.     Informed patient I will call her again Thursday once BMP results are back to touch base about what time patient will be to arrive at for her TAVR CT.

## 2022-06-21 NOTE — PROGRESS NOTES
HISTORY:     Kavitha Headley is a very nice 76 year old woman here for evaluation of severe aortic stenosis which was diagnosed when she was hospitalized in March 2022.  She has a pertinent history of alcohol use disorder and is recently completed a treatment program, mild coronary disease on angiogram in 2015, mildly dilated ascending aorta, paroxysmal supraventricular tachycardia and PACs, hypertension, and dyslipidemia.     She is severely short of breath, even with activities of daily. Her symptoms have worsened over about a year. A yr ago she was working out at a gym doing water walking.  She also gets chest heaviness with activity and orthostatic lightheadedness.  She has not had syncope.  She recently was treated with a prednisone burst for arthritis in her right hand and ankle and the arthritis symptoms have resolved.  She has not been on long-term prednisone.          PAST MEDICAL HISTORY:  Past Medical History        Past Medical History:   Diagnosis Date     Alcohol abuse       Anxiety disorder       Ascending aorta dilatation (H)       Bariatric surgery status 1996?     gastric bypass, [facility] and     Benign hypertension       Chronic insomnia       Chronic pain syndrome       Chronic back and neck pain, chronic pain due to osteoarthritis multiple joints     Coronary artery disease involving native coronary artery of native heart without angina pectoris 10/16/2018     Minimal coronary artery disease on coronary angiogram in 2015.      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     Major depressive disorder, single episode, severe, without mention of psychotic behavior       Mixed hyperlipidemia       Pelvic relaxation disorder       Surgical  intervention for cystocele/rectocele 3,11/2012     Personal history of urinary calculi 06/2006     left ureteral stone,lithotripsy     Psoriasis       PVC (premature ventricular contraction)       Severe aortic stenosis       Spinal stenosis       Stage III chronic kidney disease (H) 2005     SVT (supraventricular tachycardia) (H)       likely atrial tachycardia            MEDICATIONS:      Current Outpatient Medications   Medication     acamprosate (CAMPRAL) 333 MG EC tablet     acetaminophen (TYLENOL) 500 MG tablet     albuterol (PROAIR HFA/PROVENTIL HFA/VENTOLIN HFA) 108 (90 Base) MCG/ACT inhaler     aspirin 81 MG EC tablet     atenolol (TENORMIN) 25 MG tablet     citalopram (CELEXA) 20 MG tablet     diclofenac (VOLTAREN) 50 MG EC tablet     folic acid (FOLVITE) 1 MG tablet     furosemide (LASIX) 20 MG tablet     gabapentin (NEURONTIN) 300 MG capsule     hydrOXYzine (ATARAX) 25 MG tablet     ipratropium - albuterol 0.5 mg/2.5 mg/3 mL (DUONEB) 0.5-2.5 (3) MG/3ML neb solution     methocarbamol (ROBAXIN) 500 MG tablet     mirtazapine (REMERON) 15 MG tablet     MISC NATURAL PRODUCTS PO     MISC NATURAL PRODUCTS PO     mometasone-formoterol (DULERA) 200-5 MCG/ACT inhaler     Multiple Vitamins-Minerals (CENTRUM SILVER) per tablet     polyethylene glycol (MIRALAX) 17 GM/Dose powder     pravastatin (PRAVACHOL) 20 MG tablet     QUEtiapine (SEROQUEL XR) 150 MG TB24 24 hr tablet     QUEtiapine (SEROQUEL) 50 MG tablet     senna-docusate (SENOKOT-S/PERICOLACE) 8.6-50 MG tablet     traZODone (DESYREL) 100 MG tablet     valACYclovir (VALTREX) 1000 mg tablet     MISC NATURAL PRODUCTS PO     MISC NATURAL PRODUCTS PO      No current facility-administered medications for this visit.         ALLERGIES:        Allergies   Allergen Reactions     Bactrim [Sulfamethoxazole W/Trimethoprim] Hives     Codeine Itching       NAUSEA     Morphine Itching       NAUSEA         SOCIAL HISTORY:  Kavitha Headley  reports that she has never smoked.  She has never used smokeless tobacco. She reports previous alcohol use of about 63.0 standard drinks of alcohol per week. She reports that she does not use drugs.     FAMILY HISTORY:          Family History   Problem Relation Age of Onset     Substance Abuse Father       Cancer Father           throat and lung mets     Diabetes No family hx of       Coronary Artery Disease No family hx of       Cerebrovascular Disease No family hx of           REVIEW OF SYSTEMS:  10 point review of systems within normal other than mentioned in H and P.     PHYSICAL EXAM:      BP: 121/63 Pulse: 69            Vital Signs with Ranges  Pulse:  [69] 69  BP: (121)/(63) 121/63  188 lbs 3.2 oz     Constitutional: awake, alert, no distress  Eyes: sclera nonicteric  ENT: trachea midline  Respiratory: Clear to auscultation bilaterally  Cardiovascular: II/VI crescendo decrescendo systolic murmur loudest at the right upper sternal border  GI: nondistended, nontender, bowel sounds present  Skin: dry, no rash no edema  Musculoskeletal: grossly normal muscle bulk and tone  Neurologic: no focal deficits  Neuropsychiatric: appropriate affact                 DATA:   LAST CHOLESTEROL:        Lab Results   Component Value Date     CHOL 218 10/20/2021     CHOL 300 03/03/2021            Lab Results   Component Value Date     HDL 86 10/20/2021      03/03/2021            Lab Results   Component Value Date     LDL 85 10/20/2021      03/03/2021            Lab Results   Component Value Date     TRIG 234 10/20/2021     TRIG 77 03/03/2021            Lab Results   Component Value Date     CHOLHDLRATIO 1.5 07/28/2015         LAST BMP:        Lab Results   Component Value Date      03/24/2022      05/12/2021            Lab Results   Component Value Date     POTASSIUM 3.7 03/24/2022     POTASSIUM 4.0 05/12/2021            Lab Results   Component Value Date     CHLORIDE 105 03/24/2022     CHLORIDE 107 05/12/2021            Lab Results    Component Value Date     BIRGIT 8.9 03/24/2022     BIRGIT 9.4 05/12/2021            Lab Results   Component Value Date     CO2 29 03/24/2022     CO2 30 05/12/2021            Lab Results   Component Value Date     BUN 8 03/24/2022     BUN 15 05/12/2021            Lab Results   Component Value Date     CR 1.05 03/24/2022     CR 1.00 05/12/2021            Lab Results   Component Value Date     GLC 75 03/24/2022      05/12/2021         LAST CBC:        Lab Results   Component Value Date     WBC 5.9 03/21/2022     WBC 5.6 03/27/2021            Lab Results   Component Value Date     RBC 2.88 03/21/2022     RBC 3.64 03/27/2021            Lab Results   Component Value Date     HGB 10.0 03/24/2022     HGB 10.7 03/27/2021            Lab Results   Component Value Date     HCT 27.8 03/21/2022     HCT 33.4 03/27/2021      Lab Results   Component Value Date     MCV 97 03/21/2022     MCV 92 03/27/2021            Lab Results   Component Value Date     MCH 28.8 03/21/2022     MCH 29.4 03/27/2021            Lab Results   Component Value Date     MCHC 29.9 03/21/2022     MCHC 32.0 03/27/2021            Lab Results   Component Value Date     RDW 16.1 03/21/2022     RDW 15.7 03/27/2021            Lab Results   Component Value Date      03/21/2022      03/27/2021      liver function tests      Liver Function Studies - Recent Labs   Lab Test 03/10/22  1454   PROTTOTAL 6.8   ALBUMIN 3.0*   BILITOTAL 0.7   ALKPHOS 111   AST 41   ALT 23            EKG I personally reviewed: Baseline artifact normal sinus rhythm (p-waves most visible in V3), inferior infarct, possible anterior infarct      Echo 3/12/22 images were reviewed.  : Interpretation Summary 1. The left ventricle is normal in size. Left ventricular systolic function isnormal. The visual ejection fraction is 60-65%. No regional wall motionabnormalities noted. There is no thrombus seen in the left ventricle.2. The right ventricle is normal size. The right ventricular  systolic functionis normal.3. The left atrium is moderate to severely dilated4. Severe valvular aortic stenosis. The mean AoV pressure gradient is 48.0mmHg. The peak AoV pressure gradient is 83.8 mmHg. The calculated aortic valveare is 0.97 cm^2.5. No pericardial effusion.6. In comparison to the previous report dated 10/20/2021, there has been aninterval increase in transaortic gradients.     There is mild (1+) tricuspid regurgitation. The right ventricular systolic  pressure is approximated at 56.5 mmHg plus the right atrial pressure. Right  ventricular systolic pressure is elevated, consistent with moderate to severe  pulmonary hypertension.           ASSESSMENT:  76 year old female with severe symptomatic aortic stenosis. In view of her older age, she is at moderate risks for adverse outcomes after surgical aortic valve replacement and should be considered for TAVR. She understands and is willing to proceed with this. She wishes to have surgical bail out.

## 2022-06-21 NOTE — TELEPHONE ENCOUNTER
Called patient and left a message informing her Dr. Parker is requesting she get labs done in the next couple of days so we are reassess kidney function prior to TAVR CT. Informed patient those lab results will be used to decide how long patient will need IV hydration pre CT scan.     Will send message to , Petrona, asking her to reach out and get patient scheduled for BMP.

## 2022-06-22 LAB
ATRIAL RATE - MUSE: 73 BPM
DIASTOLIC BLOOD PRESSURE - MUSE: NORMAL MMHG
INTERPRETATION ECG - MUSE: NORMAL
P AXIS - MUSE: 62 DEGREES
PR INTERVAL - MUSE: 212 MS
QRS DURATION - MUSE: 72 MS
QT - MUSE: 430 MS
QTC - MUSE: 473 MS
R AXIS - MUSE: 7 DEGREES
SYSTOLIC BLOOD PRESSURE - MUSE: NORMAL MMHG
T AXIS - MUSE: 66 DEGREES
VENTRICULAR RATE- MUSE: 73 BPM

## 2022-06-23 ENCOUNTER — LAB (OUTPATIENT)
Dept: LAB | Facility: CLINIC | Age: 77
End: 2022-06-23
Payer: COMMERCIAL

## 2022-06-23 DIAGNOSIS — N18.30 STAGE 3 CHRONIC KIDNEY DISEASE, UNSPECIFIED WHETHER STAGE 3A OR 3B CKD (H): ICD-10-CM

## 2022-06-23 LAB
ANION GAP SERPL CALCULATED.3IONS-SCNC: 2 MMOL/L (ref 3–14)
BUN SERPL-MCNC: 22 MG/DL (ref 7–30)
CALCIUM SERPL-MCNC: 9.1 MG/DL (ref 8.5–10.1)
CHLORIDE BLD-SCNC: 107 MMOL/L (ref 94–109)
CO2 SERPL-SCNC: 28 MMOL/L (ref 20–32)
CREAT SERPL-MCNC: 1.12 MG/DL (ref 0.52–1.04)
GFR SERPL CREATININE-BSD FRML MDRD: 50 ML/MIN/1.73M2
GLUCOSE BLD-MCNC: 173 MG/DL (ref 70–99)
POTASSIUM BLD-SCNC: 4.4 MMOL/L (ref 3.4–5.3)
SODIUM SERPL-SCNC: 137 MMOL/L (ref 133–144)

## 2022-06-23 PROCEDURE — 36415 COLL VENOUS BLD VENIPUNCTURE: CPT | Performed by: INTERNAL MEDICINE

## 2022-06-23 PROCEDURE — 80048 BASIC METABOLIC PNL TOTAL CA: CPT | Performed by: INTERNAL MEDICINE

## 2022-06-27 ENCOUNTER — TELEPHONE (OUTPATIENT)
Dept: CARDIOLOGY | Facility: CLINIC | Age: 77
End: 2022-06-27

## 2022-06-27 NOTE — TELEPHONE ENCOUNTER
Recent Results (from the past 120 hour(s))   Basic metabolic panel    Collection Time: 06/23/22  3:22 PM   Result Value Ref Range    Sodium 137 133 - 144 mmol/L    Potassium 4.4 3.4 - 5.3 mmol/L    Chloride 107 94 - 109 mmol/L    Carbon Dioxide (CO2) 28 20 - 32 mmol/L    Anion Gap 2 (L) 3 - 14 mmol/L    Urea Nitrogen 22 7 - 30 mg/dL    Creatinine 1.12 (H) 0.52 - 1.04 mg/dL    Calcium 9.1 8.5 - 10.1 mg/dL    Glucose 173 (H) 70 - 99 mg/dL    GFR Estimate 50 (L) >60 mL/min/1.73m2     Reviewed last BMP. Will proceed with pre-hydration per Ember GRIFFIN based on history of renal insufficiency this month. Telephoned Linda to share this information, she is aware to check in at noon tomorrow 6/28/22. Order and appt information updated.    Nayeli Jesus RN  Structural Heart Coordinator  [facility]  6/27/2022  4:18 PM

## 2022-06-28 ENCOUNTER — HOSPITAL ENCOUNTER (OUTPATIENT)
Dept: CARDIOLOGY | Facility: CLINIC | Age: 77
Discharge: HOME | End: 2022-06-28
Attending: INTERNAL MEDICINE | Admitting: INTERNAL MEDICINE
Payer: COMMERCIAL

## 2022-06-28 DIAGNOSIS — I35.0 SEVERE AORTIC STENOSIS: ICD-10-CM

## 2022-06-28 PROCEDURE — 74174 CTA ABD&PLVS W/CONTRAST: CPT

## 2022-06-28 PROCEDURE — 74174 CTA ABD&PLVS W/CONTRAST: CPT | Mod: 26 | Performed by: INTERNAL MEDICINE

## 2022-06-28 PROCEDURE — 71275 CT ANGIOGRAPHY CHEST: CPT | Mod: 26 | Performed by: INTERNAL MEDICINE

## 2022-06-28 PROCEDURE — 258N000003 HC RX IP 258 OP 636: Performed by: INTERNAL MEDICINE

## 2022-06-28 PROCEDURE — 250N000011 HC RX IP 250 OP 636: Performed by: INTERNAL MEDICINE

## 2022-06-28 RX ORDER — IOPAMIDOL 755 MG/ML
500 INJECTION, SOLUTION INTRAVASCULAR ONCE
Status: COMPLETED | OUTPATIENT
Start: 2022-06-28 | End: 2022-06-28

## 2022-06-28 RX ORDER — ACYCLOVIR 200 MG/1
0-1 CAPSULE ORAL
Status: DISCONTINUED | OUTPATIENT
Start: 2022-06-28 | End: 2022-06-29 | Stop reason: HOSPADM

## 2022-06-28 RX ORDER — DIPHENHYDRAMINE HCL 25 MG
25 CAPSULE ORAL
Status: DISCONTINUED | OUTPATIENT
Start: 2022-06-28 | End: 2022-06-29 | Stop reason: HOSPADM

## 2022-06-28 RX ORDER — ONDANSETRON 2 MG/ML
4 INJECTION INTRAMUSCULAR; INTRAVENOUS
Status: DISCONTINUED | OUTPATIENT
Start: 2022-06-28 | End: 2022-06-29 | Stop reason: HOSPADM

## 2022-06-28 RX ORDER — METHYLPREDNISOLONE SODIUM SUCCINATE 125 MG/2ML
125 INJECTION, POWDER, LYOPHILIZED, FOR SOLUTION INTRAMUSCULAR; INTRAVENOUS
Status: DISCONTINUED | OUTPATIENT
Start: 2022-06-28 | End: 2022-06-29 | Stop reason: HOSPADM

## 2022-06-28 RX ORDER — DIPHENHYDRAMINE HYDROCHLORIDE 50 MG/ML
25-50 INJECTION INTRAMUSCULAR; INTRAVENOUS
Status: DISCONTINUED | OUTPATIENT
Start: 2022-06-28 | End: 2022-06-29 | Stop reason: HOSPADM

## 2022-06-28 RX ADMIN — SODIUM CHLORIDE 300 ML: 9 INJECTION, SOLUTION INTRAVENOUS at 12:45

## 2022-06-28 RX ADMIN — IOPAMIDOL 115 ML: 755 INJECTION, SOLUTION INTRAVENOUS at 15:37

## 2022-07-07 ENCOUNTER — OFFICE VISIT (OUTPATIENT)
Dept: CARDIOLOGY | Facility: CLINIC | Age: 77
End: 2022-07-07
Payer: COMMERCIAL

## 2022-07-07 ENCOUNTER — DOCUMENTATION ONLY (OUTPATIENT)
Dept: CARDIOLOGY | Facility: CLINIC | Age: 77
End: 2022-07-07

## 2022-07-07 VITALS
OXYGEN SATURATION: 98 % | BODY MASS INDEX: 31.65 KG/M2 | WEIGHT: 185.4 LBS | HEART RATE: 85 BPM | DIASTOLIC BLOOD PRESSURE: 66 MMHG | HEIGHT: 64 IN | SYSTOLIC BLOOD PRESSURE: 112 MMHG

## 2022-07-07 DIAGNOSIS — I35.0 SEVERE AORTIC STENOSIS: Primary | ICD-10-CM

## 2022-07-07 DIAGNOSIS — I10 BENIGN ESSENTIAL HYPERTENSION: ICD-10-CM

## 2022-07-07 DIAGNOSIS — I25.10 CORONARY ARTERY DISEASE INVOLVING NATIVE CORONARY ARTERY OF NATIVE HEART WITHOUT ANGINA PECTORIS: ICD-10-CM

## 2022-07-07 DIAGNOSIS — E78.5 HYPERLIPIDEMIA LDL GOAL <100: ICD-10-CM

## 2022-07-07 PROCEDURE — 99215 OFFICE O/P EST HI 40 MIN: CPT | Performed by: NURSE PRACTITIONER

## 2022-07-07 NOTE — PROGRESS NOTES
Cardiology Clinic Progress Note  Kavitha Headley MRN# [id number]   YOB: 1945 Age: 76 year old     Primary cardiologist: Dr. Ugarte    Reason for visit: post-angiogram follow-up     History of presenting illness:    Kavitha Headley is a pleasant 76 year old patient with past medical history significant for severe aortic stenosis characterized by RENE 0.97 cm2, mean gradient of 48 mmHg, velocity of 4.5 m/s, and LVEF 60-65% who is being evaluated for transcatheter aortic valve replacement due to progressive dyspnea on exertion.  She recently underwent coronary angiogram as part of that work-up that showed mild nonobstructive coronary disease.  She is here today for follow-up.    Other past medical history is notable for substance use disorder s/p multiple rehab admissions and interventions, mildly dilated ascending aorta measuring 4.1 cm, paroxysmal SVT and PACs, hypertension, hyperlipidemia, and obesity s/p gastric bypass surgery.     I reviewed her most recent CBC from 6/20/2022 that shows hemoglobin of 9.3.  Per chart review, it appears her hemoglobin has been anywhere from 8-10 over the last year, baseline prior around 11.  Her BMP from 6/23/2022 shows creatinine of 1.12 and GFR 50, and normal electrolytes.  Her baseline creatinine appears to be 1.1-1.3.    Today patient reports ongoing dyspnea on exertion with minimal activity.  She denies chest pain, syncope or near syncope, or palpitations.  Her right groin site has healed well without bleeding or bruising.  Her blood pressure is well controlled today.         Assessment and Plan:     ASSESSMENT:    1. Severe symptomatic aortic stenosis.  Currently being evaluated for TAVR.  Her work-up including TAVR guided CT and coronary angiogram are complete and she appears to be a good candidate for the above procedure.  2. Hypertension.  Well-controlled on Toprol-XL.  3. Hyperlipidemia.  LDL 85 on pravastatin 20 mg daily.  4. Mildly dilated ascending aorta  "measuring 4.1 cm  5. Paroxysmal SVT and PACs.  On Toprol-XL.  6. Obesity s/p gastric bypass.  Baseline hemoglobin 10-11, more recently hemoglobin 8-10.  Most recent EGD from 2017 showed no acute pathology.    PLAN:     1. Due to persistently low hemoglobin, recommend follow-up with GI.  Will likely need repeat EGD for clearance for upcoming valve replacement.  2. Repeat echocardiogram to reevaluate severity of valve.   3. Follow-up with Dr. Parker in valve clinic with testing results and to discuss timing of TAVR at that time.         Ember Bush, DNP, APRN, CNP  Page: [phone] (8a-5p M-F)    Orders this Visit:  No orders of the defined types were placed in this encounter.    No orders of the defined types were placed in this encounter.    Medications Discontinued During This Encounter   Medication Reason     acamprosate (CAMPRAL) 333 MG EC tablet Stopped by Patient     MISC NATURAL PRODUCTS PO Stopped by Patient       Today's clinic visit entailed:  Review of the result(s) of each unique test - coronary angiogram, echo, labs  Ordering of each unique test  Prescription drug management  45 minutes spent on the date of the encounter doing chart review, review of test results, interpretation of tests, patient visit, documentation and discussion with family   Provider  Link to [facility] Help Grid     The level of medical decision making during this visit was of moderate complexity.           Review of Systems:     Review of Systems:  Skin:  not assessed     Eyes:  not assessed    ENT:  not assessed    Respiratory:  Positive for dyspnea on exertion  Cardiovascular:    Positive for;fatigue  Gastroenterology: not assessed    Genitourinary:  not assessed    Musculoskeletal:  not assessed    Neurologic:  not assessed    Psychiatric:  not assessed    Heme/Lymph/Imm:  not assessed    Endocrine:  Negative              Physical Exam:   Vitals: /66   Pulse 85   Ht 1.626 m (5' 4.02\")   Wt 84.1 kg (185 lb 6.4 oz)   " SpO2 98%   BMI 31.81 kg/m    Constitutional:  cooperative, alert and oriented, well developed, well nourished, in no acute distress        Skin:  warm and dry to the touch        Head:  normocephalic        Eyes:  pupils equal and round        ENT:           Neck:  JVP normal        Chest:  normal breath sounds, clear to auscultation, normal A-P diameter, normal symmetry, normal respiratory excursion, no use of accessory muscles        Cardiac: regular rhythm;normal S1 and S2       systolic murmur;grade 2;grade 3          Abdomen:  abdomen soft        Vascular: pulses full and equal                               right femoral bruit (-)      Extremities and Back:  no edema        Neurological:  no gross motor deficits;affect appropriate             Medications:     Current Outpatient Medications   Medication Sig Dispense Refill     acetaminophen (TYLENOL) 500 MG tablet Take 1,000 mg by mouth 2 times daily as needed for pain       albuterol (PROAIR HFA/PROVENTIL HFA/VENTOLIN HFA) 108 (90 Base) MCG/ACT inhaler Inhale 2 puffs into the lungs every 6 hours as needed for wheezing 18 g 0     aspirin 81 MG EC tablet Take 81 mg by mouth daily       citalopram (CELEXA) 20 MG tablet Take 1 tablet (20 mg) by mouth daily 90 tablet 1     diclofenac (VOLTAREN) 50 MG EC tablet Take 1 tablet (50 mg) by mouth 2 times daily as needed for moderate pain 90 tablet 1     folic acid (FOLVITE) 1 MG tablet Take 1 mg by mouth daily       gabapentin (NEURONTIN) 300 MG capsule Take 1 capsule (300 mg) by mouth 3 times daily 270 capsule 1     hydrOXYzine (ATARAX) 25 MG tablet Take 25 mg by mouth every 6 hours as needed for itching       ipratropium - albuterol 0.5 mg/2.5 mg/3 mL (DUONEB) 0.5-2.5 (3) MG/3ML neb solution Take 1 vial by nebulization 2 times daily And BID PRN       methocarbamol (ROBAXIN) 500 MG tablet Take 1 tablet (500 mg) by mouth 2 times daily 180 tablet 1     metoprolol succinate ER (TOPROL XL) 25 MG 24 hr tablet Take 1 tablet  (25 mg) by mouth daily 90 tablet 3     mirtazapine (REMERON) 15 MG tablet Take 1 tablet (15 mg) by mouth At Bedtime 30 tablet 1     MISC NATURAL PRODUCTS PO Take 1 capsule by mouth daily B-glucan capsule       MISC NATURAL PRODUCTS PO Take 1 tablet by mouth daily Keto - Ketogenesis tablets       MISC NATURAL PRODUCTS PO Take 1 tablet by mouth daily L-Tryptophan 1000 mg daily        mometasone-formoterol (DULERA) 200-5 MCG/ACT inhaler Inhale 2 puffs into the lungs 2 times daily 13 g 0     Multiple Vitamins-Minerals (CENTRUM SILVER) per tablet Take 1 tablet by mouth daily       polyethylene glycol (MIRALAX) 17 GM/Dose powder Take 17 g by mouth 2 times daily as needed for constipation       pravastatin (PRAVACHOL) 20 MG tablet Take 1 tablet (20 mg) by mouth daily 90 tablet 1     QUEtiapine (SEROQUEL XR) 150 MG TB24 24 hr tablet Take 2 tablets (300 mg) by mouth At Bedtime 60 tablet 0     QUEtiapine (SEROQUEL) 50 MG tablet Take 1 tablet (50 mg) by mouth nightly as needed (Anxiety, insomnia, hallucinations) 30 tablet 0     senna-docusate (SENOKOT-S/PERICOLACE) 8.6-50 MG tablet Take 1 tablet by mouth 2 times daily       traZODone (DESYREL) 100 MG tablet TAKE 1 TABLET NIGHTLY AS NEEDED FOR SLEEP 30 tablet 1     valACYclovir (VALTREX) 1000 mg tablet Take 2,000 mg by mouth as needed X1 ASAP at symptom onset of cold sore         Family History   Problem Relation Age of Onset     Substance Abuse Father      Cancer Father         throat and lung mets     Diabetes No family hx of      Coronary Artery Disease No family hx of      Cerebrovascular Disease No family hx of        Social History     Socioeconomic History     Marital status:      Spouse name: Mt     Number of children: 4     Years of education: 18     Highest education level: Not on file   Occupational History     Occupation: nurse     Employer: [employer]     Employer: RETIRED   Tobacco Use     Smoking status: Never Smoker     Smokeless tobacco: Never Used    Substance and Sexual Activity     Alcohol use: Not Currently     Alcohol/week: 63.0 standard drinks     Types: 63 Standard drinks or equivalent per week     Drug use: No     Sexual activity: Yes     Partners: Male   Other Topics Concern      Service Not Asked     Blood Transfusions No     Caffeine Concern Yes     Comment: 1-2 cups per day      Occupational Exposure Yes     Comment: blood     Hobby Hazards No     Sleep Concern Yes     Stress Concern Yes     Weight Concern Yes     Comment: gastric  byepass     Special Diet No     Back Care No     Exercise Yes     Comment: walk, swin     Bike Helmet No     Seat Belt Yes     Self-Exams Yes     Parent/sibling w/ CABG, MI or angioplasty before 65F 55M? Not Asked   Social History Narrative     Not on file     Social Determinants of Health     Financial Resource Strain: Not on file   Food Insecurity: Not on file   Transportation Needs: Not on file   Physical Activity: Not on file   Stress: Not on file   Social Connections: Not on file   Intimate Partner Violence: Not on file   Housing Stability: Not on file            Past Medical History:     Past Medical History:   Diagnosis Date     Alcohol abuse      Anxiety disorder      Ascending aorta dilatation (H)      Bariatric surgery status 1996?    gastric bypass, [facility] and     Benign hypertension      Chronic insomnia      Chronic pain syndrome     Chronic back and neck pain, chronic pain due to osteoarthritis multiple joints     Coronary artery disease involving native coronary artery of native heart without angina pectoris 10/16/2018    Minimal coronary artery disease on coronary angiogram in 2015.      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     Major  depressive disorder, single episode, severe, without mention of psychotic behavior      Mixed hyperlipidemia      Pelvic relaxation disorder     Surgical intervention for cystocele/rectocele 3,11/2012     Personal history of urinary calculi 06/2006    left ureteral stone,lithotripsy     Psoriasis      PVC (premature ventricular contraction)      Severe aortic stenosis      Spinal stenosis      Stage III chronic kidney disease (H) 2005     SVT (supraventricular tachycardia) (H)     likely atrial tachycardia              Past Surgical History:     Past Surgical History:   Procedure Laterality Date     APPENDECTOMY  3/2004    incidental     ARTHRODESIS TOE(S) Right 1/31/2020    Procedure: RIGHT FIRST METATARSAL PHALANGEAL JOINT ARTHRODESIS;  Surgeon: Steven Reyes MD;  Location:  OR     C MEDIASTINOSCOPY W OR WO BIOPSY  2/2008    Videomediastinoscopy and, for mediastinal adenopathy -reactive lymphoid hyperplasia     CARPAL TUNNEL RELEASE RT/LT  10/2010    Carpometacarpal excisional arthroplasty with a fascial autograft and APL suspension sling (02636). 2. Left thumb metacarpophalangeal joint fusion with autologous bone graft (01288). 3. Left endoscopic carpal tunnel release      CHOLECYSTECTOMY, LAPOROSCOPIC  11/2010    Cholecystectomy, Laparoscopic     COLONOSCOPY N/A 9/8/2016    Procedure: COMBINED COLONOSCOPY, SINGLE OR MULTIPLE BIOPSY/POLYPECTOMY BY BIOPSY;  Surgeon: Moe Barlow MD;  Location:  GI     CV CORONARY ANGIOGRAM N/A 6/20/2022    Procedure: Coronary Angiogram;  Surgeon: Nora Parker MD;  Location:  HEART CARDIAC CATH LAB     CV PCI N/A 6/20/2022    Procedure: Percutaneous Coronary Intervention;  Surgeon: Nora Parker MD;  Location:  HEART CARDIAC CATH LAB     CV RIGHT HEART CATH MEASUREMENTS RECORDED N/A 6/20/2022    Procedure: Right Heart Catheterization;  Surgeon: Nora Parker MD;  Location:  HEART CARDIAC CATH LAB     CYSTOCELE REPAIR  11/2012    davinci laparoscopic  sacrocolpopexy, enterocele repair, lysis of adhesions, placement of retropubic mid urethral sling, cystoscopy     CYSTOSCOPY, LITHOTRIPSY, COMBINED  6/2006    Left extracorporeal shock wave lithotripsy, cystoscopy, left ureteral stent placement.     CYSTOSCOPY, REMOVE STENT(S), COMBINED  7/2006    Cystoscopy, removal of left ureteral stent, retrograde pyelography, flexible and rigid ureteroscopy and holmium laser lithotripsy, basket removal of stone fragments, ureteral stent placement.      ENDOSCOPIC ULTRASOUND UPPER GASTROINTESTINAL TRACT (GI) N/A 6/12/2017    Procedure: ENDOSCOPIC ULTRASOUND, ESOPHAGOSCOPY / UPPER GASTROINTESTINAL TRACT (GI);  ENDOSCOPIC ULTRASOUND, ESOPHAGOSCOPY / UPPER GASTROINTESTINAL TRACT (GI);  Surgeon: Parth Graham MD;  Location:  GI     HERNIA REPAIR  4/2012    bilateral augmentation mastopexy, ventral hernia repair, and medial thigh liposuction on 04/06/2012.      HYSTERECTOMY VAGINAL, BILATERAL SALPINGO-OOPHERECTOMY, COMBINED  1998    due to myoma and bleeding     JOINT REPLACEMENT, HIP RT/LT  4/2004    right total hip arthroplasty     LAPAROTOMY, LYSIS ADHESIONS, COMBINED  3/2004    lysis adhesions, ventral hernia repair, appendectomy incidentally     LYMPH NODE BIOPSY  4/2008    right axillary, reactive follicular and paracortical hyperplasia.     MAMMOPLASTY AUGMENTATION BILATERAL  4/2012     REPAIR HAMMER TOE Right 1/31/2020    Procedure: WITH SECOND AND THIRD CLAW TOE RECONSTRUCTION;  Surgeon: Steven Reyes MD;  Location:  OR     REVISE RECONSTRUCTED BREAST  6/7/2012    Left breast capsulotomy.      ZZC GASTRIC BYPASS,OBESE<100CM ARIANNA-EN-Y  1996     [facility] REPAIR OF RECTOCELE  3/2012     [facility] TOTAL KNEE ARTHROPLASTY  12/2005    left               Allergies:   Bactrim [sulfamethoxazole w/trimethoprim], Codeine, and Morphine       Data:   All laboratory data reviewed:    Recent Labs   Lab Test 03/24/22  0721 10/20/21  1251 03/03/21  1612 02/12/20  0534 06/26/19  1012  08/04/18  0900 02/05/18  1252   LDL  --  85 145* 218*   < >  --   --    HDL  --  86 140 81   < >  --   --    NHDL  --  132* 160* 236*   < >  --   --    CHOL  --  218* 300* 317*   < >  --   --    TRIG  --  234* 77 91   < >  --   --    TSH  --   --  1.05  --   --  3.05 1.40   IRON 40  --   --   --   --   --   --      --   --   --   --   --   --    IRONSAT 15  --   --   --   --   --   --    CHUCK 29  --  16  --    < >  --   --     < > = values in this interval not displayed.       Lab Results   Component Value Date    WBC 5.7 06/20/2022    WBC 5.6 03/27/2021    RBC 3.28 (L) 06/20/2022    RBC 3.64 (L) 03/27/2021    HGB 9.3 (L) 06/20/2022    HGB 10.7 (L) 03/27/2021    HCT 29.4 (L) 06/20/2022    HCT 33.4 (L) 03/27/2021    MCV 90 06/20/2022    MCV 92 03/27/2021    MCH 28.4 06/20/2022    MCH 29.4 03/27/2021    MCHC 31.6 06/20/2022    MCHC 32.0 03/27/2021    RDW 16.1 (H) 06/20/2022    RDW 15.7 (H) 03/27/2021     06/20/2022     03/27/2021       Lab Results   Component Value Date     06/23/2022     05/12/2021    POTASSIUM 4.4 06/23/2022    POTASSIUM 4.0 05/12/2021    CHLORIDE 107 06/23/2022    CHLORIDE 107 05/12/2021    CO2 28 06/23/2022    CO2 30 05/12/2021    ANIONGAP 2 (L) 06/23/2022    ANIONGAP 3 05/12/2021     (H) 06/23/2022     (H) 05/12/2021    BUN 22 06/23/2022    BUN 15 05/12/2021    CR 1.12 (H) 06/23/2022    CR 1.00 05/12/2021    GFRESTIMATED 50 (L) 06/23/2022    GFRESTIMATED 54 (L) 05/12/2021    GFRESTBLACK 63 05/12/2021    BIRGIT 9.1 06/23/2022    BIRGIT 9.4 05/12/2021      Lab Results   Component Value Date    AST 41 03/10/2022    AST 20 03/30/2021    ALT 23 03/10/2022    ALT 16 03/30/2021       Lab Results   Component Value Date    A1C 5.7 09/29/2010       Lab Results   Component Value Date    INR 0.98 06/20/2022    INR 0.9 03/30/2021    INR 1.03 06/16/2017

## 2022-07-07 NOTE — LETTER
7/7/2022    Nayeli Castorena PA-C  [address] [facility] [address]    RE: Kavitha Headley       Dear Colleague,     I had the pleasure of seeing Kavitha Headley in the [facility].  Cardiology Clinic Progress Note  Kavitha Headley MRN# [id number]   YOB: 1945 Age: 76 year old     Primary cardiologist: Dr. Ugarte    Reason for visit: post-angiogram follow-up     History of presenting illness:    Kavitha Headley is a pleasant 76 year old patient with past medical history significant for severe aortic stenosis characterized by RENE 0.97 cm2, mean gradient of 48 mmHg, velocity of 4.5 m/s, and LVEF 60-65% who is being evaluated for transcatheter aortic valve replacement due to progressive dyspnea on exertion.  She recently underwent coronary angiogram as part of that work-up that showed mild nonobstructive coronary disease.  She is here today for follow-up.    Other past medical history is notable for substance use disorder s/p multiple rehab admissions and interventions, mildly dilated ascending aorta measuring 4.1 cm, paroxysmal SVT and PACs, hypertension, hyperlipidemia, and obesity s/p gastric bypass surgery.     I reviewed her most recent CBC from 6/20/2022 that shows hemoglobin of 9.3.  Per chart review, it appears her hemoglobin has been anywhere from 8-10 over the last year, baseline prior around 11.  Her BMP from 6/23/2022 shows creatinine of 1.12 and GFR 50, and normal electrolytes.  Her baseline creatinine appears to be 1.1-1.3.    Today patient reports ongoing dyspnea on exertion with minimal activity.  She denies chest pain, syncope or near syncope, or palpitations.  Her right groin site has healed well without bleeding or bruising.  Her blood pressure is well controlled today.         Assessment and Plan:     ASSESSMENT:    1. Severe symptomatic aortic stenosis.  Currently being evaluated for TAVR.  Her work-up including TAVR guided CT and coronary angiogram are  complete and she appears to be a good candidate for the above procedure.  2. Hypertension.  Well-controlled on Toprol-XL.  3. Hyperlipidemia.  LDL 85 on pravastatin 20 mg daily.  4. Mildly dilated ascending aorta measuring 4.1 cm  5. Paroxysmal SVT and PACs.  On Toprol-XL.  6. Obesity s/p gastric bypass.  Baseline hemoglobin 10-11, more recently hemoglobin 8-10.  Most recent EGD from 2017 showed no acute pathology.    PLAN:     1. Due to persistently low hemoglobin, recommend follow-up with GI.  Will likely need repeat EGD for clearance for upcoming valve replacement.  2. Repeat echocardiogram to reevaluate severity of valve.   3. Follow-up with Dr. Parker in valve clinic with testing results and to discuss timing of TAVR at that time.         Ember Bush, RAFAEL, APRN, CNP  Page: [phone] (8a-5p M-F)    Orders this Visit:  No orders of the defined types were placed in this encounter.    No orders of the defined types were placed in this encounter.    Medications Discontinued During This Encounter   Medication Reason     acamprosate (CAMPRAL) 333 MG EC tablet Stopped by Patient     [facility] Stopped [facility] Patient       Today's clinic visit entailed:  Review of the result(s) of each unique test - coronary angiogram, echo, labs  Ordering of each unique test  Prescription drug management  45 minutes spent on the date of the encounter doing chart review, review of test results, interpretation of tests, patient visit, documentation and discussion with family   Provider  Link to [facility] Help Grid     The level of medical decision making during this visit was of moderate complexity.           Review of Systems:     Review of Systems:  Skin:  not assessed     Eyes:  not assessed    ENT:  not assessed    Respiratory:  Positive for dyspnea on exertion  Cardiovascular:    Positive for;fatigue  Gastroenterology: not assessed    Genitourinary:  not assessed    Musculoskeletal:  not assessed    Neurologic:  not  "assessed    Psychiatric:  not assessed    Heme/Lymph/Imm:  not assessed    Endocrine:  Negative              Physical Exam:   Vitals: /66   Pulse 85   Ht 1.626 m (5' 4.02\")   Wt 84.1 kg (185 lb 6.4 oz)   SpO2 98%   BMI 31.81 kg/m    Constitutional:  cooperative, alert and oriented, well developed, well nourished, in no acute distress        Skin:  warm and dry to the touch        Head:  normocephalic        Eyes:  pupils equal and round        ENT:           Neck:  JVP normal        Chest:  normal breath sounds, clear to auscultation, normal A-P diameter, normal symmetry, normal respiratory excursion, no use of accessory muscles        Cardiac: regular rhythm;normal S1 and S2       systolic murmur;grade 2;grade 3          Abdomen:  abdomen soft        Vascular: pulses full and equal                               right femoral bruit (-)      Extremities and Back:  no edema        Neurological:  no gross motor deficits;affect appropriate             Medications:     Current Outpatient Medications   Medication Sig Dispense Refill     acetaminophen (TYLENOL) 500 MG tablet Take 1,000 mg by mouth 2 times daily as needed for pain       albuterol (PROAIR HFA/PROVENTIL HFA/VENTOLIN HFA) 108 (90 Base) MCG/ACT inhaler Inhale 2 puffs into the lungs every 6 hours as needed for wheezing 18 g 0     aspirin 81 MG EC tablet Take 81 mg by mouth daily       citalopram (CELEXA) 20 MG tablet Take 1 tablet (20 mg) by mouth daily 90 tablet 1     diclofenac (VOLTAREN) 50 MG EC tablet Take 1 tablet (50 mg) by mouth 2 times daily as needed for moderate pain 90 tablet 1     folic acid (FOLVITE) 1 MG tablet Take 1 mg by mouth daily       gabapentin (NEURONTIN) 300 MG capsule Take 1 capsule (300 mg) by mouth 3 times daily 270 capsule 1     hydrOXYzine (ATARAX) 25 MG tablet Take 25 mg by mouth every 6 hours as needed for itching       ipratropium - albuterol 0.5 mg/2.5 mg/3 mL (DUONEB) 0.5-2.5 (3) MG/3ML neb solution Take 1 vial by " nebulization 2 times daily And BID PRN       methocarbamol (ROBAXIN) 500 MG tablet Take 1 tablet (500 mg) by mouth 2 times daily 180 tablet 1     metoprolol succinate ER (TOPROL XL) 25 MG 24 hr tablet Take 1 tablet (25 mg) by mouth daily 90 tablet 3     mirtazapine (REMERON) 15 MG tablet Take 1 tablet (15 mg) by mouth At Bedtime 30 tablet 1     MISC NATURAL PRODUCTS PO Take 1 capsule by mouth daily B-glucan capsule       MISC NATURAL PRODUCTS PO Take 1 tablet by mouth daily Keto - Ketogenesis tablets       MISC NATURAL PRODUCTS PO Take 1 tablet by mouth daily L-Tryptophan 1000 mg daily        mometasone-formoterol (DULERA) 200-5 MCG/ACT inhaler Inhale 2 puffs into the lungs 2 times daily 13 g 0     Multiple Vitamins-Minerals (CENTRUM SILVER) per tablet Take 1 tablet by mouth daily       polyethylene glycol (MIRALAX) 17 GM/Dose powder Take 17 g by mouth 2 times daily as needed for constipation       pravastatin (PRAVACHOL) 20 MG tablet Take 1 tablet (20 mg) by mouth daily 90 tablet 1     QUEtiapine (SEROQUEL XR) 150 MG TB24 24 hr tablet Take 2 tablets (300 mg) by mouth At Bedtime 60 tablet 0     QUEtiapine (SEROQUEL) 50 MG tablet Take 1 tablet (50 mg) by mouth nightly as needed (Anxiety, insomnia, hallucinations) 30 tablet 0     senna-docusate (SENOKOT-S/PERICOLACE) 8.6-50 MG tablet Take 1 tablet by mouth 2 times daily       traZODone (DESYREL) 100 MG tablet TAKE 1 TABLET NIGHTLY AS NEEDED FOR SLEEP 30 tablet 1     valACYclovir (VALTREX) 1000 mg tablet Take 2,000 mg by mouth as needed X1 ASAP at symptom onset of cold sore         Family History   Problem Relation Age of Onset     Substance Abuse Father      Cancer Father         throat and lung mets     Diabetes No family hx of      Coronary Artery Disease No family hx of      Cerebrovascular Disease No family hx of        Social History     Socioeconomic History     Marital status:      Spouse name: Mt     Number of children: 4     Years of education: 18      Highest education level: Not on file   Occupational History     Occupation: nurse     Employer: [employer]     Employer: RETIRED   Tobacco Use     Smoking status: Never Smoker     Smokeless tobacco: Never Used   Substance and Sexual Activity     Alcohol use: Not Currently     Alcohol/week: 63.0 standard drinks     Types: 63 Standard drinks or equivalent per week     Drug use: No     Sexual activity: Yes     Partners: Male   Other Topics Concern      Service Not Asked     Blood Transfusions No     Caffeine Concern Yes     Comment: 1-2 cups per day      Occupational Exposure Yes     Comment: blood     Hobby Hazards No     Sleep Concern Yes     Stress Concern Yes     Weight Concern Yes     Comment: gastric  byepass     Special Diet No     Back Care No     Exercise Yes     Comment: walk, swin     Bike Helmet No     Seat Belt Yes     Self-Exams Yes     Parent/sibling w/ CABG, MI or angioplasty before 65F 55M? Not Asked   Social History Narrative     Not on file     Social Determinants of Health     Financial Resource Strain: Not on file   Food Insecurity: Not on file   Transportation Needs: Not on file   Physical Activity: Not on file   Stress: Not on file   Social Connections: Not on file   Intimate Partner Violence: Not on file   Housing Stability: Not on file            Past Medical History:     Past Medical History:   Diagnosis Date     Alcohol abuse      Anxiety disorder      Ascending aorta dilatation (H)      Bariatric surgery status 1996?    gastric bypass, [facility] and     Benign hypertension      Chronic insomnia      Chronic pain syndrome     Chronic back and neck pain, chronic pain due to osteoarthritis multiple joints     Coronary artery disease involving native coronary artery of native heart without angina pectoris 10/16/2018    Minimal coronary artery disease on coronary angiogram in 2015.      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     Major depressive disorder, single episode, severe, without mention of psychotic behavior      Mixed hyperlipidemia      Pelvic relaxation disorder     Surgical intervention for cystocele/rectocele 3,11/2012     Personal history of urinary calculi 06/2006    left ureteral stone,lithotripsy     Psoriasis      PVC (premature ventricular contraction)      Severe aortic stenosis      Spinal stenosis      Stage III chronic kidney disease (H) 2005     SVT (supraventricular tachycardia) (H)     likely atrial tachycardia              Past Surgical History:     Past Surgical History:   Procedure Laterality Date     APPENDECTOMY  3/2004    incidental     ARTHRODESIS TOE(S) Right 1/31/2020    Procedure: RIGHT FIRST METATARSAL PHALANGEAL JOINT ARTHRODESIS;  Surgeon: Steven Reyes MD;  Location:  OR     C MEDIASTINOSCOPY W OR WO BIOPSY  2/2008    Videomediastinoscopy and, for mediastinal adenopathy -reactive lymphoid hyperplasia     CARPAL TUNNEL RELEASE RT/LT  10/2010    Carpometacarpal excisional arthroplasty with a fascial autograft and APL suspension sling (77979). 2. Left thumb metacarpophalangeal joint fusion with autologous bone graft (49120). 3. Left endoscopic carpal tunnel release      CHOLECYSTECTOMY, LAPOROSCOPIC  11/2010    Cholecystectomy, Laparoscopic     COLONOSCOPY N/A 9/8/2016    Procedure: COMBINED COLONOSCOPY, SINGLE OR MULTIPLE BIOPSY/POLYPECTOMY BY BIOPSY;  Surgeon: Moe Barlow MD;  Location:  GI     CV CORONARY ANGIOGRAM N/A 6/20/2022    Procedure: Coronary Angiogram;  Surgeon: Nora Parker MD;  Location:  HEART CARDIAC CATH LAB     CV PCI N/A 6/20/2022    Procedure: Percutaneous Coronary Intervention;  Surgeon: Nora Parker MD;  Location:  HEART CARDIAC CATH LAB     CV RIGHT HEART CATH  MEASUREMENTS RECORDED N/A 6/20/2022    Procedure: Right Heart Catheterization;  Surgeon: Nora Parker MD;  Location:  HEART CARDIAC CATH LAB     CYSTOCELE REPAIR  11/2012    davinci laparoscopic sacrocolpopexy, enterocele repair, lysis of adhesions, placement of retropubic mid urethral sling, cystoscopy     CYSTOSCOPY, LITHOTRIPSY, COMBINED  6/2006    Left extracorporeal shock wave lithotripsy, cystoscopy, left ureteral stent placement.     CYSTOSCOPY, REMOVE STENT(S), COMBINED  7/2006    Cystoscopy, removal of left ureteral stent, retrograde pyelography, flexible and rigid ureteroscopy and holmium laser lithotripsy, basket removal of stone fragments, ureteral stent placement.      ENDOSCOPIC ULTRASOUND UPPER GASTROINTESTINAL TRACT (GI) N/A 6/12/2017    Procedure: ENDOSCOPIC ULTRASOUND, ESOPHAGOSCOPY / UPPER GASTROINTESTINAL TRACT (GI);  ENDOSCOPIC ULTRASOUND, ESOPHAGOSCOPY / UPPER GASTROINTESTINAL TRACT (GI);  Surgeon: Parth Graham MD;  Location:  GI     HERNIA REPAIR  4/2012    bilateral augmentation mastopexy, ventral hernia repair, and medial thigh liposuction on 04/06/2012.      HYSTERECTOMY VAGINAL, BILATERAL SALPINGO-OOPHERECTOMY, COMBINED  1998    due to myoma and bleeding     JOINT REPLACEMENT, HIP RT/LT  4/2004    right total hip arthroplasty     LAPAROTOMY, LYSIS ADHESIONS, COMBINED  3/2004    lysis adhesions, ventral hernia repair, appendectomy incidentally     LYMPH NODE BIOPSY  4/2008    right axillary, reactive follicular and paracortical hyperplasia.     MAMMOPLASTY AUGMENTATION BILATERAL  4/2012     REPAIR HAMMER TOE Right 1/31/2020    Procedure: WITH SECOND AND THIRD CLAW TOE RECONSTRUCTION;  Surgeon: Steven Reyes MD;  Location:  OR     REVISE RECONSTRUCTED BREAST  6/7/2012    Left breast capsulotomy.      ZZC GASTRIC BYPASS,OBESE<100CM ARIANNA-EN-Y  1996     [facility] REPAIR OF RECTOCELE  3/2012     [facility] TOTAL KNEE ARTHROPLASTY  12/2005    left               Allergies:   Bactrim  [sulfamethoxazole w/trimethoprim], Codeine, and Morphine       Data:   All laboratory data reviewed:    Recent Labs   Lab Test 03/24/22  0721 10/20/21  1251 03/03/21  1612 02/12/20  0534 06/26/19  1012 08/04/18  0900 02/05/18  1252   LDL  --  85 145* 218*   < >  --   --    HDL  --  86 140 81   < >  --   --    NHDL  --  132* 160* 236*   < >  --   --    CHOL  --  218* 300* 317*   < >  --   --    TRIG  --  234* 77 91   < >  --   --    TSH  --   --  1.05  --   --  3.05 1.40   IRON 40  --   --   --   --   --   --      --   --   --   --   --   --    IRONSAT 15  --   --   --   --   --   --    CHUCK 29  --  16  --    < >  --   --     < > = values in this interval not displayed.       Lab Results   Component Value Date    WBC 5.7 06/20/2022    WBC 5.6 03/27/2021    RBC 3.28 (L) 06/20/2022    RBC 3.64 (L) 03/27/2021    HGB 9.3 (L) 06/20/2022    HGB 10.7 (L) 03/27/2021    HCT 29.4 (L) 06/20/2022    HCT 33.4 (L) 03/27/2021    MCV 90 06/20/2022    MCV 92 03/27/2021    MCH 28.4 06/20/2022    MCH 29.4 03/27/2021    MCHC 31.6 06/20/2022    MCHC 32.0 03/27/2021    RDW 16.1 (H) 06/20/2022    RDW 15.7 (H) 03/27/2021     06/20/2022     03/27/2021       Lab Results   Component Value Date     06/23/2022     05/12/2021    POTASSIUM 4.4 06/23/2022    POTASSIUM 4.0 05/12/2021    CHLORIDE 107 06/23/2022    CHLORIDE 107 05/12/2021    CO2 28 06/23/2022    CO2 30 05/12/2021    ANIONGAP 2 (L) 06/23/2022    ANIONGAP 3 05/12/2021     (H) 06/23/2022     (H) 05/12/2021    BUN 22 06/23/2022    BUN 15 05/12/2021    CR 1.12 (H) 06/23/2022    CR 1.00 05/12/2021    GFRESTIMATED 50 (L) 06/23/2022    GFRESTIMATED 54 (L) 05/12/2021    GFRESTBLACK 63 05/12/2021    BIRGIT 9.1 06/23/2022    BIRGIT 9.4 05/12/2021      Lab Results   Component Value Date    AST 41 03/10/2022    AST 20 03/30/2021    ALT 23 03/10/2022    ALT 16 03/30/2021       Lab Results   Component Value Date    A1C 5.7 09/29/2010       Lab Results    Component Value Date    INR 0.98 06/20/2022    INR 0.9 03/30/2021    INR 1.03 06/16/2017             Thank you for allowing me to participate in the care of your patient.      Sincerely,     Ember Bush [facility]  cc:   No referring provider defined for this encounter.

## 2022-07-07 NOTE — PROGRESS NOTES
Patient was presented this morning at the multidisciplinary valve conference. Plan is for patient to have a repeat echo and follow-up with [facility] to assess low hemoglobin. Once that workup is complete if plan to proceed with TAVR procedure the below information will be applied.     Valve: Payne  Approach: Left TF   [place]: Yes  Sedation: MAC    This information was reviewed with patient during her follow-up office visit in the clinic today. Patient was scheduled for repeat echo tomorrow 07/08/2022 and patient will call her GI MD to get scheduled for a follow-up visit. Patient was encouraged to call with any additional questions or concerns.      Alie Smith RN  Structural Heart Coordinator  [facility]

## 2022-07-08 ENCOUNTER — ANCILLARY PROCEDURE (OUTPATIENT)
Dept: CARDIOLOGY | Facility: CLINIC | Age: 77
End: 2022-07-08
Payer: COMMERCIAL

## 2022-07-08 DIAGNOSIS — I35.0 SEVERE AORTIC STENOSIS: ICD-10-CM

## 2022-07-08 LAB — LVEF ECHO: NORMAL

## 2022-07-08 PROCEDURE — 93306 TTE W/DOPPLER COMPLETE: CPT | Performed by: INTERNAL MEDICINE

## 2022-07-15 ENCOUNTER — TRANSFERRED RECORDS (OUTPATIENT)
Dept: HEALTH INFORMATION MANAGEMENT | Facility: CLINIC | Age: 77
End: 2022-07-15

## 2022-07-15 ENCOUNTER — VIRTUAL VISIT (OUTPATIENT)
Dept: ADDICTION MEDICINE | Facility: CLINIC | Age: 77
End: 2022-07-15
Payer: COMMERCIAL

## 2022-07-15 DIAGNOSIS — F51.04 CHRONIC INSOMNIA: ICD-10-CM

## 2022-07-15 DIAGNOSIS — G89.4 CHRONIC PAIN SYNDROME: ICD-10-CM

## 2022-07-15 DIAGNOSIS — F10.20 ALCOHOL USE DISORDER, SEVERE, DEPENDENCE (H): Primary | ICD-10-CM

## 2022-07-15 PROCEDURE — 88305 TISSUE EXAM BY PATHOLOGIST: CPT | Mod: TC,ORL | Performed by: INTERNAL MEDICINE

## 2022-07-15 PROCEDURE — 99214 OFFICE O/P EST MOD 30 MIN: CPT | Mod: 95 | Performed by: FAMILY MEDICINE

## 2022-07-15 RX ORDER — QUETIAPINE 150 MG/1
300 TABLET, FILM COATED, EXTENDED RELEASE ORAL AT BEDTIME
Qty: 60 TABLET | Refills: 0 | Status: SHIPPED | OUTPATIENT
Start: 2022-07-15 | End: 2022-07-15

## 2022-07-15 RX ORDER — TRAZODONE HYDROCHLORIDE 100 MG/1
TABLET ORAL
Qty: 30 TABLET | Refills: 3 | Status: SHIPPED | OUTPATIENT
Start: 2022-07-15 | End: 2023-01-11

## 2022-07-15 RX ORDER — GABAPENTIN 600 MG/1
600 TABLET ORAL AT BEDTIME
Qty: 90 TABLET | Refills: 1 | Status: SHIPPED | OUTPATIENT
Start: 2022-07-15 | End: 2023-01-10

## 2022-07-15 RX ORDER — TRAZODONE HYDROCHLORIDE 100 MG/1
TABLET ORAL
Qty: 30 TABLET | Refills: 1 | Status: SHIPPED | OUTPATIENT
Start: 2022-07-15 | End: 2022-07-15

## 2022-07-15 RX ORDER — QUETIAPINE 150 MG/1
300 TABLET, FILM COATED, EXTENDED RELEASE ORAL AT BEDTIME
Qty: 60 TABLET | Refills: 3 | Status: SHIPPED | OUTPATIENT
Start: 2022-07-15 | End: 2022-10-24

## 2022-07-15 ASSESSMENT — PATIENT HEALTH QUESTIONNAIRE - PHQ9
SUM OF ALL RESPONSES TO PHQ QUESTIONS 1-9: 3
SUM OF ALL RESPONSES TO PHQ QUESTIONS 1-9: 3
10. IF YOU CHECKED OFF ANY PROBLEMS, HOW DIFFICULT HAVE THESE PROBLEMS MADE IT FOR YOU TO DO YOUR WORK, TAKE CARE OF THINGS AT HOME, OR GET ALONG WITH OTHER PEOPLE: NOT DIFFICULT AT ALL

## 2022-07-15 NOTE — PROGRESS NOTES
Linda is a 76 year old who is being evaluated via a billable video visit.      How would you like to obtain your AVS? Mail a copy  If the video visit is dropped, the invitation should be resent by: Text to cell phone: [phone]  Will anyone else be joining your video visit? No         [facility]    A/P                                                    ASSESSMENT/PLAN  Diagnoses and all orders for this visit:  Alcohol use disorder, severe, dependence (H)  Chronic insomnia  -     gabapentin (NEURONTIN) 600 MG tablet; Take 1 tablet (600 mg) by mouth At Bedtime  -     QUEtiapine (SEROQUEL XR) 150 MG TB24 24 hr tablet; Take 2 tablets (300 mg) by mouth At Bedtime  -     traZODone (DESYREL) 100 MG tablet; TAKE 1 TABLET NIGHTLY AS NEEDED FOR SLEEP  Chronic pain syndrome  -     gabapentin (NEURONTIN) 600 MG tablet; Take 1 tablet (600 mg) by mouth At Bedtime    Orders Placed This Encounter   Medications     DISCONTD: QUEtiapine (SEROQUEL XR) 150 MG TB24 24 hr tablet     Sig: Take 2 tablets (300 mg) by mouth At Bedtime     Dispense:  60 tablet     Refill:  0     DISCONTD: traZODone (DESYREL) 100 MG tablet     Sig: TAKE 1 TABLET NIGHTLY AS NEEDED FOR SLEEP     Dispense:  30 tablet     Refill:  1     gabapentin (NEURONTIN) 600 MG tablet     Sig: Take 1 tablet (600 mg) by mouth At Bedtime     Dispense:  90 tablet     Refill:  1     QUEtiapine (SEROQUEL XR) 150 MG TB24 24 hr tablet     Sig: Take 2 tablets (300 mg) by mouth At Bedtime     Dispense:  60 tablet     Refill:  3     traZODone (DESYREL) 100 MG tablet     Sig: TAKE 1 TABLET NIGHTLY AS NEEDED FOR SLEEP     Dispense:  30 tablet     Refill:  3       Problem list updated Jul 15, 2022   No problems updated.      Jul 15, 2022  - No alcohol use since March after attending treatment in [place]. 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  - Medication regimen simplified - refills as above  - New PCP soon  - No further SE from seroquel; will continue this given  Linda's consistent understanding of the potential risks of metabolic syndrome      Last encounter A/P  Feb 2, 2022  - Encouraged her to take campral as prescribed, consistently on schedule, to help reduce cravings and amount of alcohol consumed  - Will call to follow-up regarding her success in stopping/reducing alcohol use  - Recommended ED visit given reports of withdrawal symptoms; she jignesh consider this  - referrals placed for MH and treatment resources      PDMP Review       Value Time User    State PDMP site checked  Yes 7/15/2022  4:02 PM Jin Ackerman MD            RTC  Return in 5 months (on 12/20/2022) for video visit, 3pm.      Counseled the patient on the importance of having a recovery program in addition to medication to manage recovery.  Components include avoiding isolating, having willingness to change, avoiding triggers and managing cravings. Encouraged having some type of sober network and practicing honesty with trusted support person(s). Encouraged other services such as counseling, 12 step or other self-help organizations.            SUBJECTIVE                                                    Kavitha Headley is a 76 year old female who presents to clinic today for follow up    Visit performed Virtual, via video    Video-Visit Details    Type of service:  Video Visit    Video Start Time: 3:50 PM  Video End Time: 4:10 PM    Originating Location (pt. Location): Home    Distant Location (provider location):  [facility]     Platform used for Video Visit: HereOrThere        PHQ-9 Score:   PHQ 2/2/2022 2/8/2022 7/15/2022   PHQ-9 Total Score 13 4 3   Q9: Thoughts of better off dead/self-harm past 2 weeks Not at all Not at all Not at all       NITHIN-7 Score:  NITHIN-7 SCORE 1/13/2022 2/2/2022 2/8/2022   Total Score - - -   Total Score - 8 (mild anxiety) -   Total Score 8 8 3           Recent HPI Details:  HPI Sep 29, 2021  - Suffers from chronic pain concerns. Was in [place] and tried a  "medication that is not approved for use in the [place]. Asking for an alternative if possible  - Ongoing cravings intermittently, not much better or worse, but the acamprosate is helpful to better tolerate these and avoid alcohol  - Reports \"a couple slips\" in recent months.  notes these are often precipitated by significant back pain  - Notes she was completely abstinent while in [place]. Returned to use after an episode of difficult communication with her son, which was disappointing  - Has used seroquel in the past for sleep. She stopped this in 2018 for fear of long-term side-effects, including tardive dyskinesia, which was mentioned   HPI Feb 2, 2022  - Hospitalized in December for almost 2 weeks for shortness of breath caused by influenza A  - Mood was somewhat low earlier in December, and her activity levels have dropped since her hospitalization, which has worsened a depression  - Feeling low, not interested in doing things, laying on the couch, and apathetic generally  - Has been taking 300mg seroquel every night. No over-sedation, no SE concerns. Also taking trazodone, melatonin, valerian root, and L-tryotophan to help with her sleep  - Has been trying to get to AA meetings, and generally getting out to see family (including sister and daughter)  - Currently drinking more, 2-3 glasses of demetrice, about 3 shots per glass  - Taking Campral daily, TID, \"I feel it's counter-productive to take it when I'm drinking\". She stops drinking on the weekends when her partner is with her, which is always more stimulating for her. Stops most days of the week.      TODAY'S VISIT  HPI Jul 15, 2022  - Stopped celexa, remeron, and hydroxyzine about 4-6 weeks ago and feels great; noticing better energy and better mood  - Taking seroquel at night, [id number] + valerian + trazodone + melatonin  - No alcohol since Mar 3. Attended treatment in [place] with Alex and had a great experience  - Has occasional cravings in the " summer when she is on the boat, but hasn't had any close calls or any alcohol use. Otherwise no cravings  - Reviewed med list. Seeing new PCP soon      OBJECTIVE                                                    PHYSICAL EXAM:  There were no vitals taken for this visit.    GENERAL: healthy, alert and no distress  EYES: Eyes grossly normal to inspection, PERRL and conjunctivae and sclerae normal  RESP: No respiratory distress  MENTAL STATUS EXAM  Appearance/Behavior: No appearant distress  Speech: Normal  Mood/Affect: normal affect  Insight: Adequate    LAB  No results found for any visits on 07/15/22.      HISTORY                                                    Problem list reviewed & adjusted, as indicated.  Patient Active Problem List   Diagnosis     Spinal stenosis     Benign essential hypertension     Chronic insomnia     Alcohol use disorder, severe, in early remission (H)     CKD (chronic kidney disease) stage 3, GFR 30-59 ml/min (H)     Knee joint replacement status     Chronic pain syndrome     Bariatric surgery status     Personal history of urinary calculi     Anemia, unspecified type     Anxiety     Vitamin B12 deficiency (non anemic)     Liver disease, chronic, due to alcohol (H)     Coronary artery disease involving native coronary artery of native heart without angina pectoris     Mild ascending aorta dilatation (H)     Psoriasis - on Humira - Dr. Gauri Clark with dermatology     Thiamine deficiency     Cold sore     Gastroesophageal reflux disease with esophagitis - chronic due to alcohol     Elevated brain natriuretic peptide (BNP) level     Enlarged pulmonary artery (H)     Acute respiratory failure with hypoxia (H) - 12/2021 - due to influenza A - persistent issues, ? underlying RAD - seeing [facility] - Dr. Ventura     Chronic diastolic (congestive) heart failure (H)     Mild recurrent major depression (H)     Alcohol dependence (H)     Alcohol withdrawal syndrome without complication (H)      Non-traumatic rhabdomyolysis     Status post coronary angiogram         MEDICATION LIST (prior to visit)  acetaminophen (TYLENOL) 500 MG tablet, Take 1,000 mg by mouth 2 times daily as needed for pain  aspirin 81 MG EC tablet, Take 81 mg by mouth daily  folic acid (FOLVITE) 1 MG tablet, Take 1 mg by mouth daily  gabapentin (NEURONTIN) 300 MG capsule, Take 1 capsule (300 mg) by mouth 3 times daily  methocarbamol (ROBAXIN) 500 MG tablet, Take 1 tablet (500 mg) by mouth 2 times daily  MISC NATURAL PRODUCTS PO, Take 1 capsule by mouth daily B-glucan capsule  MISC NATURAL PRODUCTS PO, Take 1 tablet by mouth daily Keto - Ketogenesis tablets  MISC NATURAL PRODUCTS PO, Take 1 tablet by mouth daily L-Tryptophan 1000 mg daily   Multiple Vitamins-Minerals (CENTRUM SILVER) per tablet, Take 1 tablet by mouth daily  polyethylene glycol (MIRALAX) 17 GM/Dose powder, Take 17 g by mouth 2 times daily as needed for constipation  pravastatin (PRAVACHOL) 20 MG tablet, Take 1 tablet (20 mg) by mouth daily  senna-docusate (SENOKOT-S/PERICOLACE) 8.6-50 MG tablet, Take 1 tablet by mouth 2 times daily  valACYclovir (VALTREX) 1000 mg tablet, Take 2,000 mg by mouth as needed X1 ASAP at symptom onset of cold sore  metoprolol succinate ER (TOPROL XL) 25 MG 24 hr tablet, Take 1 tablet (25 mg) by mouth daily (Patient not taking: Reported on 7/15/2022)    No current facility-administered medications on file prior to visit.      MEDICATION LIST (after visit)  Current Outpatient Medications   Medication     acetaminophen (TYLENOL) 500 MG tablet     aspirin 81 MG EC tablet     folic acid (FOLVITE) 1 MG tablet     gabapentin (NEURONTIN) 300 MG capsule     gabapentin (NEURONTIN) 600 MG tablet     methocarbamol (ROBAXIN) 500 MG tablet     MISC NATURAL PRODUCTS PO     MISC NATURAL PRODUCTS PO     MISC NATURAL PRODUCTS PO     Multiple Vitamins-Minerals (CENTRUM SILVER) per tablet     polyethylene glycol (MIRALAX) 17 GM/Dose powder     pravastatin  (PRAVACHOL) 20 MG tablet     QUEtiapine (SEROQUEL XR) 150 MG TB24 24 hr tablet     senna-docusate (SENOKOT-S/PERICOLACE) 8.6-50 MG tablet     traZODone (DESYREL) 100 MG tablet     valACYclovir (VALTREX) 1000 mg tablet     metoprolol succinate ER (TOPROL XL) 25 MG 24 hr tablet     No current facility-administered medications for this visit.         Allergies   Allergen Reactions     Bactrim [Sulfamethoxazole W/Trimethoprim] Hives     Codeine Itching     NAUSEA     Morphine Itching     NAUSEA       Additional MDM Details:  none    Jin Ackerman MD  [facility]  [phone]

## 2022-07-18 ENCOUNTER — LAB REQUISITION (OUTPATIENT)
Dept: LAB | Facility: CLINIC | Age: 77
End: 2022-07-18
Payer: COMMERCIAL

## 2022-07-18 ENCOUNTER — TELEPHONE (OUTPATIENT)
Dept: BEHAVIORAL HEALTH | Facility: CLINIC | Age: 77
End: 2022-07-18

## 2022-07-18 DIAGNOSIS — K57.30 DIVERTICULOSIS OF LARGE INTESTINE WITHOUT PERFORATION OR ABSCESS WITHOUT BLEEDING: ICD-10-CM

## 2022-07-18 DIAGNOSIS — K63.89 OTHER SPECIFIED DISEASES OF INTESTINE: ICD-10-CM

## 2022-07-18 DIAGNOSIS — Z98.0 INTESTINAL BYPASS AND ANASTOMOSIS STATUS: ICD-10-CM

## 2022-07-18 DIAGNOSIS — R12 HEARTBURN: ICD-10-CM

## 2022-07-18 DIAGNOSIS — K30 FUNCTIONAL DYSPEPSIA: ICD-10-CM

## 2022-07-18 DIAGNOSIS — D50.9 IRON DEFICIENCY ANEMIA, UNSPECIFIED: ICD-10-CM

## 2022-07-18 NOTE — TELEPHONE ENCOUNTER
Writer spoke with [facility] whom reports they have the   QUEtiapine (SEROQUEL XR) 150 MG TB24 24 hr tablet 60 tablet 3 7/15/2022  No   Sig - Route: Take 2 tablets (300 mg) by mouth At Bedtime - Oral   Order and it is in process of being filled and available for .     Writer notified patient.     Yue Issa RN on 7/18/2022 at 3:23 PM

## 2022-07-18 NOTE — TELEPHONE ENCOUNTER
Reason for Call:  Other prescription    Detailed comments: Per pt, pharmacy informed pt they have not received seroquel rx. Writer informed pt a refill was sent 7/15, but will route to RN in case it may need attention. Verified correct pharmacy. Pt will also double check with pharmacy.     Phone Number Patient can be reached at: Cell number on file:    Telephone Information:   Mobile [phone]       Best Time: Any    Can we leave a detailed message on this number? YES    Call taken on 7/18/2022 at 3:08 PM by Chante Worley

## 2022-07-18 NOTE — TELEPHONE ENCOUNTER
Reason for call:  Medication   If this is a refill request, has the caller requested the refill from the pharmacy already? No- they sent a note to her- [facility] did  Will the patient be using a [facility]? Yes  Name of the pharmacy and phone number for the current request: [facility], [phone]    Name of the medication requested:   QUEtiapine (SEROQUEL XR) 150 MG TB24 24 hr tablet Take 2 tablets (300 mg) by mouth At Bedtime         Other request: Tyron ok'd on Friday and not seeing it at pharmacy    Phone number to reach patient:  Cell number on file:    Telephone Information:   Mobile [phone]       Best Time:  Any- Note did TRANSFER to RN chat    Can we leave a detailed message on this number?  YES    Travel screening: Not Applicable

## 2022-07-19 ENCOUNTER — TELEPHONE (OUTPATIENT)
Dept: CARDIOLOGY | Facility: CLINIC | Age: 77
End: 2022-07-19

## 2022-07-19 ENCOUNTER — MEDICAL CORRESPONDENCE (OUTPATIENT)
Dept: HEALTH INFORMATION MANAGEMENT | Facility: CLINIC | Age: 77
End: 2022-07-19

## 2022-07-19 DIAGNOSIS — R06.02 SHORTNESS OF BREATH: ICD-10-CM

## 2022-07-19 DIAGNOSIS — I35.0 SEVERE AORTIC STENOSIS: Primary | ICD-10-CM

## 2022-07-19 LAB
PATH REPORT.COMMENTS IMP SPEC: NORMAL
PATH REPORT.COMMENTS IMP SPEC: NORMAL
PATH REPORT.FINAL DX SPEC: NORMAL
PATH REPORT.GROSS SPEC: NORMAL
PATH REPORT.MICROSCOPIC SPEC OTHER STN: NORMAL
PATH REPORT.RELEVANT HX SPEC: NORMAL
PHOTO IMAGE: NORMAL

## 2022-07-19 PROCEDURE — 88305 TISSUE EXAM BY PATHOLOGIST: CPT | Mod: 26 | Performed by: PATHOLOGY

## 2022-07-19 NOTE — TELEPHONE ENCOUNTER
"Dr. Parker reviewed patient's echo and stated,     \"Will need to re-review.  I think we will move forward with TAVR, but other MDs did not agree previously.     Even if she does have severe aortic stenosis her symptoms seem to be possibly out of proportion to the degree of severity.  Perhaps we should recheck her hemoglobin and is there a provider who is following up on/evaluating her chronic anemia?       I think most likely we will move forward with TAVR, but other MDs will need to review the echo and we need to make sure we have the anemia evaluated.\"      Patient did call and left a message on 07/18/2022 stating she had a colonoscopy done and it was clear. Patient want to know when her TAVR can be and how to expedite that.    Called patient back and informed her of Dr. Parker recommendation. Informed patient that unfortunately we are unable to see the results from her GI workup and clearance. Patient stated she sees Dr. Graham in the [place] [facility]. Informed her I will call and get her records faxed over so they can be scanned into the chart.  Informed patient at this current time we still have spot held for her for TAVR on 08/09/2022 with Dr. Parker.       Addendum: Faxed record request form to Dr. Graham and team at fax number: [phone].  "

## 2022-07-22 NOTE — TELEPHONE ENCOUNTER
Fax was received for Dr. Graham's office with EDG results showing no signs of bleeding. This was reviewed with Dr. Parker and her echo done on 07/08/2022 was additionally reviewed by Dr. Ko. Both MD's stated echo imaging confirms severe aortic stenosis. Dr. Parker stated patient could proceed with getting scheduled for TAVR procedure.      Patient was called and this information was reviewed with per. Patient will get scheduled for TAVR procedure on 08/09/2022 with Dr. Parker. Informed patient our , [place], will reach out to help her get scheduled for pre-op appointment, labs, and covid test all prior to TAVR day.

## 2022-07-27 ENCOUNTER — TRANSFERRED RECORDS (OUTPATIENT)
Dept: HEALTH INFORMATION MANAGEMENT | Facility: CLINIC | Age: 77
End: 2022-07-27

## 2022-08-02 ENCOUNTER — LAB (OUTPATIENT)
Dept: LAB | Facility: CLINIC | Age: 77
End: 2022-08-02
Payer: COMMERCIAL

## 2022-08-02 ENCOUNTER — OFFICE VISIT (OUTPATIENT)
Dept: CARDIOLOGY | Facility: CLINIC | Age: 77
End: 2022-08-02
Payer: COMMERCIAL

## 2022-08-02 VITALS
SYSTOLIC BLOOD PRESSURE: 115 MMHG | WEIGHT: 185 LBS | BODY MASS INDEX: 31.58 KG/M2 | HEIGHT: 64 IN | OXYGEN SATURATION: 96 % | DIASTOLIC BLOOD PRESSURE: 73 MMHG | HEART RATE: 73 BPM

## 2022-08-02 DIAGNOSIS — I50.32 CHRONIC DIASTOLIC (CONGESTIVE) HEART FAILURE (H): ICD-10-CM

## 2022-08-02 DIAGNOSIS — I35.0 SEVERE AORTIC STENOSIS: Primary | ICD-10-CM

## 2022-08-02 DIAGNOSIS — I35.0 SEVERE AORTIC STENOSIS: ICD-10-CM

## 2022-08-02 DIAGNOSIS — E78.5 HYPERLIPIDEMIA LDL GOAL <100: ICD-10-CM

## 2022-08-02 DIAGNOSIS — R06.02 SHORTNESS OF BREATH: ICD-10-CM

## 2022-08-02 DIAGNOSIS — N18.30 STAGE 3 CHRONIC KIDNEY DISEASE, UNSPECIFIED WHETHER STAGE 3A OR 3B CKD (H): ICD-10-CM

## 2022-08-02 DIAGNOSIS — I25.10 CORONARY ARTERY DISEASE INVOLVING NATIVE CORONARY ARTERY OF NATIVE HEART WITHOUT ANGINA PECTORIS: ICD-10-CM

## 2022-08-02 LAB
ABO/RH(D): NORMAL
ANION GAP SERPL CALCULATED.3IONS-SCNC: 4 MMOL/L (ref 3–14)
ANTIBODY SCREEN: NEGATIVE
BUN SERPL-MCNC: 22 MG/DL (ref 7–30)
CALCIUM SERPL-MCNC: 9.3 MG/DL (ref 8.5–10.1)
CHLORIDE BLD-SCNC: 103 MMOL/L (ref 94–109)
CO2 SERPL-SCNC: 30 MMOL/L (ref 20–32)
CREAT SERPL-MCNC: 1.07 MG/DL (ref 0.52–1.04)
ERYTHROCYTE [DISTWIDTH] IN BLOOD BY AUTOMATED COUNT: 16.2 % (ref 10–15)
GFR SERPL CREATININE-BSD FRML MDRD: 53 ML/MIN/1.73M2
GLUCOSE BLD-MCNC: 109 MG/DL (ref 70–99)
HCT VFR BLD AUTO: 33.8 % (ref 35–47)
HGB BLD-MCNC: 10.6 G/DL (ref 11.7–15.7)
INR PPP: 0.95 (ref 0.85–1.15)
MCH RBC QN AUTO: 29.4 PG (ref 26.5–33)
MCHC RBC AUTO-ENTMCNC: 31.4 G/DL (ref 31.5–36.5)
MCV RBC AUTO: 94 FL (ref 78–100)
NT-PROBNP SERPL-MCNC: 1188 PG/ML (ref 0–1800)
PLATELET # BLD AUTO: 340 10E3/UL (ref 150–450)
POTASSIUM BLD-SCNC: 4.9 MMOL/L (ref 3.4–5.3)
RBC # BLD AUTO: 3.61 10E6/UL (ref 3.8–5.2)
SODIUM SERPL-SCNC: 137 MMOL/L (ref 133–144)
SPECIMEN EXPIRATION DATE: NORMAL
WBC # BLD AUTO: 6 10E3/UL (ref 4–11)

## 2022-08-02 PROCEDURE — 83880 ASSAY OF NATRIURETIC PEPTIDE: CPT

## 2022-08-02 PROCEDURE — 82310 ASSAY OF CALCIUM: CPT

## 2022-08-02 PROCEDURE — 86901 BLOOD TYPING SEROLOGIC RH(D): CPT

## 2022-08-02 PROCEDURE — 99215 OFFICE O/P EST HI 40 MIN: CPT | Performed by: PHYSICIAN ASSISTANT

## 2022-08-02 PROCEDURE — 36415 COLL VENOUS BLD VENIPUNCTURE: CPT

## 2022-08-02 PROCEDURE — 85610 PROTHROMBIN TIME: CPT

## 2022-08-02 PROCEDURE — 85027 COMPLETE CBC AUTOMATED: CPT

## 2022-08-02 PROCEDURE — 82374 ASSAY BLOOD CARBON DIOXIDE: CPT

## 2022-08-02 NOTE — LETTER
8/2/2022    Nayeli Castorena PA-C  [address] [facility] [address]    RE: Kavitha Headley       Dear Colleague,     I had the pleasure of seeing Kavitha Headley in the [facility].  Primary Cardiologist: Dr. Ugarte    Reason for Visit: H&P for TAVR    History of Present Illness:   Linda is a pleasant 76 year old patient with past medical history significant for severe aortic stenosis (RENE 0.97 cm2, mean gradient of 48 mmHg, velocity of 4.5 m/s, and LVEF 60-65%), mild nonobstructive coronary disease, hx of alcohol use disorder s/p multiple rehab admissions and interventions (has been sober for 7 months with no withdrawal symptoms), acute on chronic HFpEF (NYHA class II; stage B), mildly dilated ascending aorta measuring 4.1 cm, paroxysmal SVT and PACs, hypertension, hyperlipidemia, CKD, and obesity s/p gastric bypass surgery.     Linda returns to clinic today, stating she is doing well.  She denies chest discomfort, palpitations, peripheral edema, orthopnea, abdominal distention, or bleeding issues.  She denies contrast dye allergy or issues with sedations.  She reports she has not had alcohol for the last 7 months and has had no withdrawals.    Assessment and Plan:  Linda is a pleasant 76 year old patient with past medical history significant for severe aortic stenosis (RENE 0.97 cm2, mean gradient of 48 mmHg, velocity of 4.5 m/s, and LVEF 60-65%), mild nonobstructive coronary disease, hx of alcohol use disorder s/p multiple rehab admissions and interventions (has been sober for 7 months with no withdrawal symptoms), acute on chronic HFpEF (NYHA class II; stage B), mildly dilated ascending aorta measuring 4.1 cm, paroxysmal SVT and PACs, hypertension, hyperlipidemia, CKD, and obesity s/p gastric bypass surgery.     The risks of TAVR is discussed with Linda which included risk of stroke (3-4%), needing permanent pacemaker (10%), permanent renal injury, anaphylactic reaction to contrast dye,  emergent complication requiring conversion to open heart surgery (0.5-1%), embolization of the valve and/or perforation of the ventricular wall (0.5%).    She will continue with aspirin.  She will be n.p.o. starting midnight prior to her procedure.    50 minutes spent on the date of the encounter with chart review, patient visit, care coordination, and documentation.      This note was completed in part using Dragon voice recognition software. Although reviewed after completion, some word and grammatical errors may occur.    Orders this Visit:  No orders of the defined types were placed in this encounter.    No orders of the defined types were placed in this encounter.    There are no discontinued medications.      No diagnosis found.    CURRENT MEDICATIONS:  Current Outpatient Medications   Medication Sig Dispense Refill     acetaminophen (TYLENOL) 500 MG tablet Take 1,000 mg by mouth 2 times daily as needed for pain       aspirin 81 MG EC tablet Take 81 mg by mouth daily       folic acid (FOLVITE) 1 MG tablet Take 1 mg by mouth daily       gabapentin (NEURONTIN) 600 MG tablet Take 1 tablet (600 mg) by mouth At Bedtime 90 tablet 1     metoprolol succinate ER (TOPROL XL) 25 MG 24 hr tablet Take 1 tablet (25 mg) by mouth daily 90 tablet 3     MISC NATURAL PRODUCTS PO Take 1 capsule by mouth daily B-glucan capsule       MISC NATURAL PRODUCTS PO Take 1 tablet by mouth daily Keto - Ketogenesis tablets       MISC NATURAL PRODUCTS PO Take 1 tablet by mouth daily L-Tryptophan 1000 mg daily        Multiple Vitamins-Minerals (CENTRUM SILVER) per tablet Take 1 tablet by mouth daily       polyethylene glycol (MIRALAX) 17 GM/Dose powder Take 17 g by mouth 2 times daily as needed for constipation       pravastatin (PRAVACHOL) 20 MG tablet Take 1 tablet (20 mg) by mouth daily 90 tablet 1     QUEtiapine (SEROQUEL XR) 150 MG TB24 24 hr tablet Take 2 tablets (300 mg) by mouth At Bedtime 60 tablet 3     senna-docusate  (SENOKOT-S/PERICOLACE) 8.6-50 MG tablet Take 1 tablet by mouth 2 times daily       traZODone (DESYREL) 100 MG tablet TAKE 1 TABLET NIGHTLY AS NEEDED FOR SLEEP 30 tablet 3     valACYclovir (VALTREX) 1000 mg tablet Take 2,000 mg by mouth as needed X1 ASAP at symptom onset of cold sore       gabapentin (NEURONTIN) 300 MG capsule Take 1 capsule (300 mg) by mouth 3 times daily (Patient not taking: Reported on 8/2/2022) 270 capsule 1     methocarbamol (ROBAXIN) 500 MG tablet Take 1 tablet (500 mg) by mouth 2 times daily (Patient not taking: Reported on 8/2/2022) 180 tablet 1       ALLERGIES     Allergies   Allergen Reactions     Bactrim [Sulfamethoxazole W/Trimethoprim] Hives     Codeine Itching     NAUSEA     Morphine Itching     NAUSEA       PAST MEDICAL HISTORY:  Past Medical History:   Diagnosis Date     Alcohol abuse      Anxiety disorder      Ascending aorta dilatation (H)      Bariatric surgery status 1996?    gastric bypass, [facility] and     Benign hypertension      Chronic insomnia      Chronic pain syndrome     Chronic back and neck pain, chronic pain due to osteoarthritis multiple joints     Coronary artery disease involving native coronary artery of native heart without angina pectoris 10/16/2018    Minimal coronary artery disease on coronary angiogram in 2015.      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     Major depressive disorder, single episode, severe, without mention of psychotic behavior      Mixed hyperlipidemia      Pelvic relaxation disorder     Surgical intervention for cystocele/rectocele 3,11/2012     Personal history of urinary calculi 06/2006    left ureteral stone,lithotripsy     Psoriasis      PVC (premature ventricular contraction)       Severe aortic stenosis      Spinal stenosis      Stage III chronic kidney disease (H) 2005     SVT (supraventricular tachycardia) (H)     likely atrial tachycardia       PAST SURGICAL HISTORY:  Past Surgical History:   Procedure Laterality Date     APPENDECTOMY  3/2004    incidental     ARTHRODESIS TOE(S) Right 1/31/2020    Procedure: RIGHT FIRST METATARSAL PHALANGEAL JOINT ARTHRODESIS;  Surgeon: Steven Reyes MD;  Location:  OR     C MEDIASTINOSCOPY W OR WO BIOPSY  2/2008    Videomediastinoscopy and, for mediastinal adenopathy -reactive lymphoid hyperplasia     CARPAL TUNNEL RELEASE RT/LT  10/2010    Carpometacarpal excisional arthroplasty with a fascial autograft and APL suspension sling (95975). 2. Left thumb metacarpophalangeal joint fusion with autologous bone graft ([id number]). 3. Left endoscopic carpal tunnel release      CHOLECYSTECTOMY, LAPOROSCOPIC  11/2010    Cholecystectomy, Laparoscopic     COLONOSCOPY N/A 9/8/2016    Procedure: COMBINED COLONOSCOPY, SINGLE OR MULTIPLE BIOPSY/POLYPECTOMY BY BIOPSY;  Surgeon: Moe Barlow MD;  Location:  GI     CV CORONARY ANGIOGRAM N/A 6/20/2022    Procedure: Coronary Angiogram;  Surgeon: Nora Parker MD;  Location: [facility] CARDIAC CATH LAB     CV PCI N/A 6/20/2022    Procedure: Percutaneous Coronary Intervention;  Surgeon: Nora Parker MD;  Location:  HEART CARDIAC CATH LAB     CV RIGHT HEART CATH MEASUREMENTS RECORDED N/A 6/20/2022    Procedure: Right Heart Catheterization;  Surgeon: Nora Parker MD;  Location:  HEART CARDIAC CATH LAB     CYSTOCELE REPAIR  11/2012    davinci laparoscopic sacrocolpopexy, enterocele repair, lysis of adhesions, placement of retropubic mid urethral sling, cystoscopy     CYSTOSCOPY, LITHOTRIPSY, COMBINED  6/2006    Left extracorporeal shock wave lithotripsy, cystoscopy, left ureteral stent placement.     CYSTOSCOPY, REMOVE STENT(S), COMBINED  7/2006    Cystoscopy, removal of left ureteral stent, retrograde  pyelography, flexible and rigid ureteroscopy and holmium laser lithotripsy, basket removal of stone fragments, ureteral stent placement.      ENDOSCOPIC ULTRASOUND UPPER GASTROINTESTINAL TRACT (GI) N/A 6/12/2017    Procedure: ENDOSCOPIC ULTRASOUND, ESOPHAGOSCOPY / UPPER GASTROINTESTINAL TRACT (GI);  ENDOSCOPIC ULTRASOUND, ESOPHAGOSCOPY / UPPER GASTROINTESTINAL TRACT (GI);  Surgeon: Parth Graham MD;  Location:  GI     HERNIA REPAIR  4/2012    bilateral augmentation mastopexy, ventral hernia repair, and medial thigh liposuction on 04/06/2012.      HYSTERECTOMY VAGINAL, BILATERAL SALPINGO-OOPHERECTOMY, COMBINED  1998    due to myoma and bleeding     JOINT REPLACEMENT, HIP RT/LT  4/2004    right total hip arthroplasty     LAPAROTOMY, LYSIS ADHESIONS, COMBINED  3/2004    lysis adhesions, ventral hernia repair, appendectomy incidentally     LYMPH NODE BIOPSY  4/2008    right axillary, reactive follicular and paracortical hyperplasia.     MAMMOPLASTY AUGMENTATION BILATERAL  4/2012     REPAIR HAMMER TOE Right 1/31/2020    Procedure: WITH SECOND AND THIRD CLAW TOE RECONSTRUCTION;  Surgeon: Steven Reyes MD;  Location:  OR     REVISE RECONSTRUCTED BREAST  6/7/2012    Left breast capsulotomy.      ZZC GASTRIC BYPASS,OBESE<100CM ARIANNA-EN-Y  1996     ZZC REPAIR OF RECTOCELE  3/2012     ZZC TOTAL KNEE ARTHROPLASTY  12/2005    left        FAMILY HISTORY:  Family History   Problem Relation Age of Onset     Substance Abuse Father      Cancer Father         throat and lung mets     Diabetes No family hx of      Coronary Artery Disease No family hx of      Cerebrovascular Disease No family hx of        SOCIAL HISTORY:  Social History     Socioeconomic History     Marital status:      Spouse name: Mt     Number of children: 4     Years of education: 18     Highest education level: None   Occupational History     Occupation: nurse     Employer: [employer]     Employer: RETIRED   Tobacco Use     Smoking status:  "Never Smoker     Smokeless tobacco: Never Used   Substance and Sexual Activity     Alcohol use: Not Currently     Alcohol/week: 63.0 standard drinks     Types: 63 Standard drinks or equivalent per week     Drug use: No     Sexual activity: Yes     Partners: Male   Other Topics Concern     Blood Transfusions No     Caffeine Concern Yes     Comment: 1-2 cups per day      Occupational Exposure Yes     Comment: blood     Hobby Hazards No     Sleep Concern Yes     Stress Concern Yes     Weight Concern Yes     Comment: gastric  byepass     Special Diet No     Back Care No     Exercise Yes     Comment: walk, swin     Bike Helmet No     Seat Belt Yes     Self-Exams Yes       Review of Systems:  Skin:  not assessed     Eyes:  not assessed    ENT:  not assessed    Respiratory:  Positive for dyspnea on exertion  Cardiovascular:    palpitations;Positive for;heaviness;fatigue;exercise intolerance  Gastroenterology: not assessed    Genitourinary:  not assessed    Musculoskeletal:  not assessed    Neurologic:  not assessed    Psychiatric:  not assessed    Heme/Lymph/Imm:  not assessed    Endocrine:  Negative      Physical Exam:  Vitals: /73 (BP Location: Right arm)   Pulse 73   Ht 1.626 m (5' 4\")   Wt 83.9 kg (185 lb)   SpO2 96%   BMI 31.76 kg/m       GEN:  NAD  NECK: No JVD  C/V:  Regular rate and rhythm; 3/6 DAVID at RUSB.  RESP: CTA, marcela.  GI: Abdomen soft, nontender, nondistended.   EXTREM: Trace pitting LE edema.   NEURO: Alert and oriented, cooperative. No obvious focal deficits.   PSYCH: Normal affect.  SKIN: Warm and dry.       Recent Lab Results:  LIPID RESULTS:  Lab Results   Component Value Date    CHOL 218 (H) 10/20/2021    CHOL 300 (H) 03/03/2021    HDL 86 10/20/2021     03/03/2021    LDL 85 10/20/2021     (H) 03/03/2021    TRIG 234 (H) 10/20/2021    TRIG 77 03/03/2021    CHOLHDLRATIO 1.5 07/28/2015       LIVER ENZYME RESULTS:  Lab Results   Component Value Date    AST 41 03/10/2022    AST 20 " 03/30/2021    ALT 23 03/10/2022    ALT 16 03/30/2021       CBC RESULTS:  Lab Results   Component Value Date    WBC 5.7 06/20/2022    WBC 5.6 03/27/2021    RBC 3.28 (L) 06/20/2022    RBC 3.64 (L) 03/27/2021    HGB 9.3 (L) 06/20/2022    HGB 10.7 (L) 03/27/2021    HCT 29.4 (L) 06/20/2022    HCT 33.4 (L) 03/27/2021    MCV 90 06/20/2022    MCV 92 03/27/2021    MCH 28.4 06/20/2022    MCH 29.4 03/27/2021    MCHC 31.6 06/20/2022    MCHC 32.0 03/27/2021    RDW 16.1 (H) 06/20/2022    RDW 15.7 (H) 03/27/2021     06/20/2022     03/27/2021       BMP RESULTS:  Lab Results   Component Value Date     06/23/2022     05/12/2021    POTASSIUM 4.4 06/23/2022    POTASSIUM 4.0 05/12/2021    CHLORIDE 107 06/23/2022    CHLORIDE 107 05/12/2021    CO2 28 06/23/2022    CO2 30 05/12/2021    ANIONGAP 2 (L) 06/23/2022    ANIONGAP 3 05/12/2021     (H) 06/23/2022     (H) 05/12/2021    BUN 22 06/23/2022    BUN 15 05/12/2021    CR 1.12 (H) 06/23/2022    CR 1.00 05/12/2021    GFRESTIMATED 50 (L) 06/23/2022    GFRESTIMATED 54 (L) 05/12/2021    GFRESTBLACK 63 05/12/2021    BIRGIT 9.1 06/23/2022    BIRGIT 9.4 05/12/2021        A1C RESULTS:  Lab Results   Component Value Date    A1C 5.7 09/29/2010       INR RESULTS:  Lab Results   Component Value Date    INR 0.98 06/20/2022    INR 0.9 03/30/2021    INR 1.03 06/16/2017       Michael Kinsey PA-C  August 2, 2022         Thank you for allowing me to participate in the care of your patient.      Sincerely,     Michael Kinsey PA-C     [facility]  cc: No referring provider defined for this encounter.

## 2022-08-02 NOTE — H&P (VIEW-ONLY)
Primary Cardiologist: Dr. Ugarte    Reason for Visit: H&P for TAVR    History of Present Illness:   Linda is a pleasant 76 year old patient with past medical history significant for severe aortic stenosis (RENE 0.97 cm2, mean gradient of 48 mmHg, velocity of 4.5 m/s, and LVEF 60-65%), mild nonobstructive coronary disease, hx of alcohol use disorder s/p multiple rehab admissions and interventions (has been sober for 7 months with no withdrawal symptoms), acute on chronic HFpEF (NYHA class II; stage B), mildly dilated ascending aorta measuring 4.1 cm, paroxysmal SVT and PACs, hypertension, hyperlipidemia, CKD, and obesity s/p gastric bypass surgery.     Linda returns to clinic today, stating she is doing well.  She denies chest discomfort, palpitations, peripheral edema, orthopnea, abdominal distention, or bleeding issues.  She denies contrast dye allergy or issues with sedations.  She reports she has not had alcohol for the last 7 months and has had no withdrawals.    Assessment and Plan:  Linda is a pleasant 76 year old patient with past medical history significant for severe aortic stenosis (RENE 0.97 cm2, mean gradient of 48 mmHg, velocity of 4.5 m/s, and LVEF 60-65%), mild nonobstructive coronary disease, hx of alcohol use disorder s/p multiple rehab admissions and interventions (has been sober for 7 months with no withdrawal symptoms), acute on chronic HFpEF (NYHA class II; stage B), mildly dilated ascending aorta measuring 4.1 cm, paroxysmal SVT and PACs, hypertension, hyperlipidemia, CKD, and obesity s/p gastric bypass surgery.     The risks of TAVR is discussed with Linda which included risk of stroke (3-4%), needing permanent pacemaker (10%), permanent renal injury, anaphylactic reaction to contrast dye, emergent complication requiring conversion to open heart surgery (0.5-1%), embolization of the valve and/or perforation of the ventricular wall (0.5%).    She will continue with aspirin.  She will be n.p.o. starting  midnight prior to her procedure.    50 minutes spent on the date of the encounter with chart review, patient visit, care coordination, and documentation.      This note was completed in part using Dragon voice recognition software. Although reviewed after completion, some word and grammatical errors may occur.    Orders this Visit:  No orders of the defined types were placed in this encounter.    No orders of the defined types were placed in this encounter.    There are no discontinued medications.      No diagnosis found.    CURRENT MEDICATIONS:  Current Outpatient Medications   Medication Sig Dispense Refill     acetaminophen (TYLENOL) 500 MG tablet Take 1,000 mg by mouth 2 times daily as needed for pain       aspirin 81 MG EC tablet Take 81 mg by mouth daily       folic acid (FOLVITE) 1 MG tablet Take 1 mg by mouth daily       gabapentin (NEURONTIN) 600 MG tablet Take 1 tablet (600 mg) by mouth At Bedtime 90 tablet 1     metoprolol succinate ER (TOPROL XL) 25 MG 24 hr tablet Take 1 tablet (25 mg) by mouth daily 90 tablet 3     MISC NATURAL PRODUCTS PO Take 1 capsule by mouth daily B-glucan capsule       MISC NATURAL PRODUCTS PO Take 1 tablet by mouth daily Keto - Ketogenesis tablets       MISC NATURAL PRODUCTS PO Take 1 tablet by mouth daily L-Tryptophan 1000 mg daily        Multiple Vitamins-Minerals (CENTRUM SILVER) per tablet Take 1 tablet by mouth daily       polyethylene glycol (MIRALAX) 17 GM/Dose powder Take 17 g by mouth 2 times daily as needed for constipation       pravastatin (PRAVACHOL) 20 MG tablet Take 1 tablet (20 mg) by mouth daily 90 tablet 1     QUEtiapine (SEROQUEL XR) 150 MG TB24 24 hr tablet Take 2 tablets (300 mg) by mouth At Bedtime 60 tablet 3     senna-docusate (SENOKOT-S/PERICOLACE) 8.6-50 MG tablet Take 1 tablet by mouth 2 times daily       traZODone (DESYREL) 100 MG tablet TAKE 1 TABLET NIGHTLY AS NEEDED FOR SLEEP 30 tablet 3     valACYclovir (VALTREX) 1000 mg tablet Take 2,000 mg by  mouth as needed X1 ASAP at symptom onset of cold sore       gabapentin (NEURONTIN) 300 MG capsule Take 1 capsule (300 mg) by mouth 3 times daily (Patient not taking: Reported on 8/2/2022) 270 capsule 1     methocarbamol (ROBAXIN) 500 MG tablet Take 1 tablet (500 mg) by mouth 2 times daily (Patient not taking: Reported on 8/2/2022) 180 tablet 1       ALLERGIES     Allergies   Allergen Reactions     Bactrim [Sulfamethoxazole W/Trimethoprim] Hives     Codeine Itching     NAUSEA     Morphine Itching     NAUSEA       PAST MEDICAL HISTORY:  Past Medical History:   Diagnosis Date     Alcohol abuse      Anxiety disorder      Ascending aorta dilatation (H)      Bariatric surgery status 1996?    gastric bypass, [facility] and     Benign hypertension      Chronic insomnia      Chronic pain syndrome     Chronic back and neck pain, chronic pain due to osteoarthritis multiple joints     Coronary artery disease involving native coronary artery of native heart without angina pectoris 10/16/2018    Minimal coronary artery disease on coronary angiogram in 2015.      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     Major depressive disorder, single episode, severe, without mention of psychotic behavior      Mixed hyperlipidemia      Pelvic relaxation disorder     Surgical intervention for cystocele/rectocele 3,11/2012     Personal history of urinary calculi 06/2006    left ureteral stone,lithotripsy     Psoriasis      PVC (premature ventricular contraction)      Severe aortic stenosis      Spinal stenosis      Stage III chronic kidney disease (H) 2005     SVT (supraventricular tachycardia) (H)     likely atrial tachycardia       PAST SURGICAL HISTORY:  Past Surgical History:   Procedure  Laterality Date     APPENDECTOMY  3/2004    incidental     ARTHRODESIS TOE(S) Right 1/31/2020    Procedure: RIGHT FIRST METATARSAL PHALANGEAL JOINT ARTHRODESIS;  Surgeon: Steven Reyes MD;  Location:  OR     C MEDIASTINOSCOPY W OR WO BIOPSY  2/2008    Videomediastinoscopy and, for mediastinal adenopathy -reactive lymphoid hyperplasia     CARPAL TUNNEL RELEASE RT/LT  10/2010    Carpometacarpal excisional arthroplasty with a fascial autograft and APL suspension sling (62977). 2. Left thumb metacarpophalangeal joint fusion with autologous bone graft ([id number]). 3. Left endoscopic carpal tunnel release      CHOLECYSTECTOMY, LAPOROSCOPIC  11/2010    Cholecystectomy, Laparoscopic     COLONOSCOPY N/A 9/8/2016    Procedure: COMBINED COLONOSCOPY, SINGLE OR MULTIPLE BIOPSY/POLYPECTOMY BY BIOPSY;  Surgeon: Moe Barlow MD;  Location:  GI     CV CORONARY ANGIOGRAM N/A 6/20/2022    Procedure: Coronary Angiogram;  Surgeon: Nora Parker MD;  Location:  HEART CARDIAC CATH LAB     CV PCI N/A 6/20/2022    Procedure: Percutaneous Coronary Intervention;  Surgeon: Nora Parker MD;  Location:  HEART CARDIAC CATH LAB     CV RIGHT HEART CATH MEASUREMENTS RECORDED N/A 6/20/2022    Procedure: Right Heart Catheterization;  Surgeon: Nora Parker MD;  Location:  HEART CARDIAC CATH LAB     CYSTOCELE REPAIR  11/2012    davinci laparoscopic sacrocolpopexy, enterocele repair, lysis of adhesions, placement of retropubic mid urethral sling, cystoscopy     CYSTOSCOPY, LITHOTRIPSY, COMBINED  6/2006    Left extracorporeal shock wave lithotripsy, cystoscopy, left ureteral stent placement.     CYSTOSCOPY, REMOVE STENT(S), COMBINED  7/2006    Cystoscopy, removal of left ureteral stent, retrograde pyelography, flexible and rigid ureteroscopy and holmium laser lithotripsy, basket removal of stone fragments, ureteral stent placement.      ENDOSCOPIC ULTRASOUND UPPER GASTROINTESTINAL TRACT (GI) N/A 6/12/2017    Procedure:  ENDOSCOPIC ULTRASOUND, ESOPHAGOSCOPY / UPPER GASTROINTESTINAL TRACT (GI);  ENDOSCOPIC ULTRASOUND, ESOPHAGOSCOPY / UPPER GASTROINTESTINAL TRACT (GI);  Surgeon: Parth Graham MD;  Location:  GI     HERNIA REPAIR  4/2012    bilateral augmentation mastopexy, ventral hernia repair, and medial thigh liposuction on 04/06/2012.      HYSTERECTOMY VAGINAL, BILATERAL SALPINGO-OOPHERECTOMY, COMBINED  1998    due to myoma and bleeding     JOINT REPLACEMENT, HIP RT/LT  4/2004    right total hip arthroplasty     LAPAROTOMY, LYSIS ADHESIONS, COMBINED  3/2004    lysis adhesions, ventral hernia repair, appendectomy incidentally     LYMPH NODE BIOPSY  4/2008    right axillary, reactive follicular and paracortical hyperplasia.     MAMMOPLASTY AUGMENTATION BILATERAL  4/2012     REPAIR HAMMER TOE Right 1/31/2020    Procedure: WITH SECOND AND THIRD CLAW TOE RECONSTRUCTION;  Surgeon: Steven Reyes MD;  Location:  OR     REVISE RECONSTRUCTED BREAST  6/7/2012    Left breast capsulotomy.      ZZC GASTRIC BYPASS,OBESE<100CM ARIANNA-EN-Y  1996     ZZC REPAIR OF RECTOCELE  3/2012     ZZC TOTAL KNEE ARTHROPLASTY  12/2005    left        FAMILY HISTORY:  Family History   Problem Relation Age of Onset     Substance Abuse Father      Cancer Father         throat and lung mets     Diabetes No family hx of      Coronary Artery Disease No family hx of      Cerebrovascular Disease No family hx of        SOCIAL HISTORY:  Social History     Socioeconomic History     Marital status:      Spouse name: Mt     Number of children: 4     Years of education: 18     Highest education level: None   Occupational History     Occupation: nurse     Employer: [employer]     Employer: RETIRED   Tobacco Use     Smoking status: Never Smoker     Smokeless tobacco: Never Used   Substance and Sexual Activity     Alcohol use: Not Currently     Alcohol/week: 63.0 standard drinks     Types: 63 Standard drinks or equivalent per week     Drug use: No      "Sexual activity: Yes     Partners: Male   Other Topics Concern     Blood Transfusions No     Caffeine Concern Yes     Comment: 1-2 cups per day      Occupational Exposure Yes     Comment: blood     Hobby Hazards No     Sleep Concern Yes     Stress Concern Yes     Weight Concern Yes     Comment: gastric  byepass     Special Diet No     Back Care No     Exercise Yes     Comment: walk, swin     Bike Helmet No     Seat Belt Yes     Self-Exams Yes       Review of Systems:  Skin:  not assessed     Eyes:  not assessed    ENT:  not assessed    Respiratory:  Positive for dyspnea on exertion  Cardiovascular:    palpitations;Positive for;heaviness;fatigue;exercise intolerance  Gastroenterology: not assessed    Genitourinary:  not assessed    Musculoskeletal:  not assessed    Neurologic:  not assessed    Psychiatric:  not assessed    Heme/Lymph/Imm:  not assessed    Endocrine:  Negative      Physical Exam:  Vitals: /73 (BP Location: Right arm)   Pulse 73   Ht 1.626 m (5' 4\")   Wt 83.9 kg (185 lb)   SpO2 96%   BMI 31.76 kg/m       GEN:  NAD  NECK: No JVD  C/V:  Regular rate and rhythm; 3/6 DAVID at RUSB.  RESP: CTA, marcela.  GI: Abdomen soft, nontender, nondistended.   EXTREM: Trace pitting LE edema.   NEURO: Alert and oriented, cooperative. No obvious focal deficits.   PSYCH: Normal affect.  SKIN: Warm and dry.       Recent Lab Results:  LIPID RESULTS:  Lab Results   Component Value Date    CHOL 218 (H) 10/20/2021    CHOL 300 (H) 03/03/2021    HDL 86 10/20/2021     03/03/2021    LDL 85 10/20/2021     (H) 03/03/2021    TRIG 234 (H) 10/20/2021    TRIG 77 03/03/2021    CHOLHDLRATIO 1.5 07/28/2015       LIVER ENZYME RESULTS:  Lab Results   Component Value Date    AST 41 03/10/2022    AST 20 03/30/2021    ALT 23 03/10/2022    ALT 16 03/30/2021       CBC RESULTS:  Lab Results   Component Value Date    WBC 5.7 06/20/2022    WBC 5.6 03/27/2021    RBC 3.28 (L) 06/20/2022    RBC 3.64 (L) 03/27/2021    HGB 9.3 (L) " 06/20/2022    HGB 10.7 (L) 03/27/2021    HCT 29.4 (L) 06/20/2022    HCT 33.4 (L) 03/27/2021    MCV 90 06/20/2022    MCV 92 03/27/2021    MCH 28.4 06/20/2022    MCH 29.4 03/27/2021    MCHC 31.6 06/20/2022    MCHC 32.0 03/27/2021    RDW 16.1 (H) 06/20/2022    RDW 15.7 (H) 03/27/2021     06/20/2022     03/27/2021       BMP RESULTS:  Lab Results   Component Value Date     06/23/2022     05/12/2021    POTASSIUM 4.4 06/23/2022    POTASSIUM 4.0 05/12/2021    CHLORIDE 107 06/23/2022    CHLORIDE 107 05/12/2021    CO2 28 06/23/2022    CO2 30 05/12/2021    ANIONGAP 2 (L) 06/23/2022    ANIONGAP 3 05/12/2021     (H) 06/23/2022     (H) 05/12/2021    BUN 22 06/23/2022    BUN 15 05/12/2021    CR 1.12 (H) 06/23/2022    CR 1.00 05/12/2021    GFRESTIMATED 50 (L) 06/23/2022    GFRESTIMATED 54 (L) 05/12/2021    GFRESTBLACK 63 05/12/2021    BIRGIT 9.1 06/23/2022    BIRGIT 9.4 05/12/2021        A1C RESULTS:  Lab Results   Component Value Date    A1C 5.7 09/29/2010       INR RESULTS:  Lab Results   Component Value Date    INR 0.98 06/20/2022    INR 0.9 03/30/2021    INR 1.03 06/16/2017           Michael Kinsey PA-C  August 2, 2022

## 2022-08-02 NOTE — PATIENT INSTRUCTIONS
TAVR PRE-PROCEDURE INSTRUCTIONS:    - Your TAVR is scheduled Tuesday 08/09/2022, 2nd case. Please check-in at 0830AM at the Registration Desk in the [facility] at [facility].    - Use the Hibiclens soap I have provided you the night before and morning of the procedure. Wash from chin to toes, please avoid your face and eyes.    - Nothing to eat or drink the morning of your TAVR.     Medication instructions:  - You make take your morning medications with sips of water.   - Please hold all vitamins and supplements the morning of your procedure.  - You will need to take 325mg of aspirin, instead of your normal 81mg, the morning of your TAVR.    - COVID-19 restrictions: You will need a COVID test prior to procedure, this is scheduled on 08/05/2022. Please do your best to quarantine/isolate between this test and your procedure day. On the day of the procedure, you may have one visitor in the pre-operative area. Patient and visitor must wear face masks. Two visitors may see you when you get to your room in the Heart Center (2nd floor of [facility]).    - You will stay overnight on Tuesday night. We will monitor you overnight for signs of bleeding, stroke, as well as need for pacemaker. On Wednesday morning, you will have an echo of your new valve and work with cardiac rehab.     It was nice to see you today! Please call with any other questions, concerns, or any changes in your health: [phone].    Alie Smith RN  Structural Heart Coordinator  [facility]

## 2022-08-02 NOTE — PROGRESS NOTES
Primary Cardiologist: Dr. Ugarte    Reason for Visit: H&P for TAVR    History of Present Illness:   Linda is a pleasant 76 year old patient with past medical history significant for severe aortic stenosis (RENE 0.97 cm2, mean gradient of 48 mmHg, velocity of 4.5 m/s, and LVEF 60-65%), mild nonobstructive coronary disease, hx of alcohol use disorder s/p multiple rehab admissions and interventions (has been sober for 7 months with no withdrawal symptoms), acute on chronic HFpEF (NYHA class II; stage B), mildly dilated ascending aorta measuring 4.1 cm, paroxysmal SVT and PACs, hypertension, hyperlipidemia, CKD, and obesity s/p gastric bypass surgery.     Linda returns to clinic today, stating she is doing well.  She denies chest discomfort, palpitations, peripheral edema, orthopnea, abdominal distention, or bleeding issues.  She denies contrast dye allergy or issues with sedations.  She reports she has not had alcohol for the last 7 months and has had no withdrawals.    Assessment and Plan:  Linda is a pleasant 76 year old patient with past medical history significant for severe aortic stenosis (RENE 0.97 cm2, mean gradient of 48 mmHg, velocity of 4.5 m/s, and LVEF 60-65%), mild nonobstructive coronary disease, hx of alcohol use disorder s/p multiple rehab admissions and interventions (has been sober for 7 months with no withdrawal symptoms), acute on chronic HFpEF (NYHA class II; stage B), mildly dilated ascending aorta measuring 4.1 cm, paroxysmal SVT and PACs, hypertension, hyperlipidemia, CKD, and obesity s/p gastric bypass surgery.     The risks of TAVR is discussed with Linda which included risk of stroke (3-4%), needing permanent pacemaker (10%), permanent renal injury, anaphylactic reaction to contrast dye, emergent complication requiring conversion to open heart surgery (0.5-1%), embolization of the valve and/or perforation of the ventricular wall (0.5%).    She will continue with aspirin.  She will be n.p.o. starting  midnight prior to her procedure.    50 minutes spent on the date of the encounter with chart review, patient visit, care coordination, and documentation.      This note was completed in part using Dragon voice recognition software. Although reviewed after completion, some word and grammatical errors may occur.    Orders this Visit:  No orders of the defined types were placed in this encounter.    No orders of the defined types were placed in this encounter.    There are no discontinued medications.      No diagnosis found.    CURRENT MEDICATIONS:  Current Outpatient Medications   Medication Sig Dispense Refill     acetaminophen (TYLENOL) 500 MG tablet Take 1,000 mg by mouth 2 times daily as needed for pain       aspirin 81 MG EC tablet Take 81 mg by mouth daily       folic acid (FOLVITE) 1 MG tablet Take 1 mg by mouth daily       gabapentin (NEURONTIN) 600 MG tablet Take 1 tablet (600 mg) by mouth At Bedtime 90 tablet 1     metoprolol succinate ER (TOPROL XL) 25 MG 24 hr tablet Take 1 tablet (25 mg) by mouth daily 90 tablet 3     MISC NATURAL PRODUCTS PO Take 1 capsule by mouth daily B-glucan capsule       MISC NATURAL PRODUCTS PO Take 1 tablet by mouth daily Keto - Ketogenesis tablets       MISC NATURAL PRODUCTS PO Take 1 tablet by mouth daily L-Tryptophan 1000 mg daily        Multiple Vitamins-Minerals (CENTRUM SILVER) per tablet Take 1 tablet by mouth daily       polyethylene glycol (MIRALAX) 17 GM/Dose powder Take 17 g by mouth 2 times daily as needed for constipation       pravastatin (PRAVACHOL) 20 MG tablet Take 1 tablet (20 mg) by mouth daily 90 tablet 1     QUEtiapine (SEROQUEL XR) 150 MG TB24 24 hr tablet Take 2 tablets (300 mg) by mouth At Bedtime 60 tablet 3     senna-docusate (SENOKOT-S/PERICOLACE) 8.6-50 MG tablet Take 1 tablet by mouth 2 times daily       traZODone (DESYREL) 100 MG tablet TAKE 1 TABLET NIGHTLY AS NEEDED FOR SLEEP 30 tablet 3     valACYclovir (VALTREX) 1000 mg tablet Take 2,000 mg by  mouth as needed X1 ASAP at symptom onset of cold sore       gabapentin (NEURONTIN) 300 MG capsule Take 1 capsule (300 mg) by mouth 3 times daily (Patient not taking: Reported on 8/2/2022) 270 capsule 1     methocarbamol (ROBAXIN) 500 MG tablet Take 1 tablet (500 mg) by mouth 2 times daily (Patient not taking: Reported on 8/2/2022) 180 tablet 1       ALLERGIES     Allergies   Allergen Reactions     Bactrim [Sulfamethoxazole W/Trimethoprim] Hives     Codeine Itching     NAUSEA     Morphine Itching     NAUSEA       PAST MEDICAL HISTORY:  Past Medical History:   Diagnosis Date     Alcohol abuse      Anxiety disorder      Ascending aorta dilatation (H)      Bariatric surgery status 1996?    gastric bypass, [facility] and     Benign hypertension      Chronic insomnia      Chronic pain syndrome     Chronic back and neck pain, chronic pain due to osteoarthritis multiple joints     Coronary artery disease involving native coronary artery of native heart without angina pectoris 10/16/2018    Minimal coronary artery disease on coronary angiogram in 2015.      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     Major depressive disorder, single episode, severe, without mention of psychotic behavior      Mixed hyperlipidemia      Pelvic relaxation disorder     Surgical intervention for cystocele/rectocele 3,11/2012     Personal history of urinary calculi 06/2006    left ureteral stone,lithotripsy     Psoriasis      PVC (premature ventricular contraction)      Severe aortic stenosis      Spinal stenosis      Stage III chronic kidney disease (H) 2005     SVT (supraventricular tachycardia) (H)     likely atrial tachycardia       PAST SURGICAL HISTORY:  Past Surgical History:   Procedure  Laterality Date     APPENDECTOMY  3/2004    incidental     ARTHRODESIS TOE(S) Right 1/31/2020    Procedure: RIGHT FIRST METATARSAL PHALANGEAL JOINT ARTHRODESIS;  Surgeon: Steven Reyes MD;  Location:  OR     C MEDIASTINOSCOPY W OR WO BIOPSY  2/2008    Videomediastinoscopy and, for mediastinal adenopathy -reactive lymphoid hyperplasia     CARPAL TUNNEL RELEASE RT/LT  10/2010    Carpometacarpal excisional arthroplasty with a fascial autograft and APL suspension sling (93542). 2. Left thumb metacarpophalangeal joint fusion with autologous bone graft ([id number]). 3. Left endoscopic carpal tunnel release      CHOLECYSTECTOMY, LAPOROSCOPIC  11/2010    Cholecystectomy, Laparoscopic     COLONOSCOPY N/A 9/8/2016    Procedure: COMBINED COLONOSCOPY, SINGLE OR MULTIPLE BIOPSY/POLYPECTOMY BY BIOPSY;  Surgeon: Moe Barlow MD;  Location:  GI     CV CORONARY ANGIOGRAM N/A 6/20/2022    Procedure: Coronary Angiogram;  Surgeon: Nora Parker MD;  Location:  HEART CARDIAC CATH LAB     CV PCI N/A 6/20/2022    Procedure: Percutaneous Coronary Intervention;  Surgeon: Nora Parker MD;  Location:  HEART CARDIAC CATH LAB     CV RIGHT HEART CATH MEASUREMENTS RECORDED N/A 6/20/2022    Procedure: Right Heart Catheterization;  Surgeon: Nora Parker MD;  Location:  HEART CARDIAC CATH LAB     CYSTOCELE REPAIR  11/2012    davinci laparoscopic sacrocolpopexy, enterocele repair, lysis of adhesions, placement of retropubic mid urethral sling, cystoscopy     CYSTOSCOPY, LITHOTRIPSY, COMBINED  6/2006    Left extracorporeal shock wave lithotripsy, cystoscopy, left ureteral stent placement.     CYSTOSCOPY, REMOVE STENT(S), COMBINED  7/2006    Cystoscopy, removal of left ureteral stent, retrograde pyelography, flexible and rigid ureteroscopy and holmium laser lithotripsy, basket removal of stone fragments, ureteral stent placement.      ENDOSCOPIC ULTRASOUND UPPER GASTROINTESTINAL TRACT (GI) N/A 6/12/2017    Procedure:  ENDOSCOPIC ULTRASOUND, ESOPHAGOSCOPY / UPPER GASTROINTESTINAL TRACT (GI);  ENDOSCOPIC ULTRASOUND, ESOPHAGOSCOPY / UPPER GASTROINTESTINAL TRACT (GI);  Surgeon: Parth Graham MD;  Location:  GI     HERNIA REPAIR  4/2012    bilateral augmentation mastopexy, ventral hernia repair, and medial thigh liposuction on 04/06/2012.      HYSTERECTOMY VAGINAL, BILATERAL SALPINGO-OOPHERECTOMY, COMBINED  1998    due to myoma and bleeding     JOINT REPLACEMENT, HIP RT/LT  4/2004    right total hip arthroplasty     LAPAROTOMY, LYSIS ADHESIONS, COMBINED  3/2004    lysis adhesions, ventral hernia repair, appendectomy incidentally     LYMPH NODE BIOPSY  4/2008    right axillary, reactive follicular and paracortical hyperplasia.     MAMMOPLASTY AUGMENTATION BILATERAL  4/2012     REPAIR HAMMER TOE Right 1/31/2020    Procedure: WITH SECOND AND THIRD CLAW TOE RECONSTRUCTION;  Surgeon: Steven Reyes MD;  Location:  OR     REVISE RECONSTRUCTED BREAST  6/7/2012    Left breast capsulotomy.      ZZC GASTRIC BYPASS,OBESE<100CM ARIANNA-EN-Y  1996     ZZC REPAIR OF RECTOCELE  3/2012     ZZC TOTAL KNEE ARTHROPLASTY  12/2005    left        FAMILY HISTORY:  Family History   Problem Relation Age of Onset     Substance Abuse Father      Cancer Father         throat and lung mets     Diabetes No family hx of      Coronary Artery Disease No family hx of      Cerebrovascular Disease No family hx of        SOCIAL HISTORY:  Social History     Socioeconomic History     Marital status:      Spouse name: Mt     Number of children: 4     Years of education: 18     Highest education level: None   Occupational History     Occupation: nurse     Employer: [employer]     Employer: RETIRED   Tobacco Use     Smoking status: Never Smoker     Smokeless tobacco: Never Used   Substance and Sexual Activity     Alcohol use: Not Currently     Alcohol/week: 63.0 standard drinks     Types: 63 Standard drinks or equivalent per week     Drug use: No      "Sexual activity: Yes     Partners: Male   Other Topics Concern     Blood Transfusions No     Caffeine Concern Yes     Comment: 1-2 cups per day      Occupational Exposure Yes     Comment: blood     Hobby Hazards No     Sleep Concern Yes     Stress Concern Yes     Weight Concern Yes     Comment: gastric  byepass     Special Diet No     Back Care No     Exercise Yes     Comment: walk, swin     Bike Helmet No     Seat Belt Yes     Self-Exams Yes       Review of Systems:  Skin:  not assessed     Eyes:  not assessed    ENT:  not assessed    Respiratory:  Positive for dyspnea on exertion  Cardiovascular:    palpitations;Positive for;heaviness;fatigue;exercise intolerance  Gastroenterology: not assessed    Genitourinary:  not assessed    Musculoskeletal:  not assessed    Neurologic:  not assessed    Psychiatric:  not assessed    Heme/Lymph/Imm:  not assessed    Endocrine:  Negative      Physical Exam:  Vitals: /73 (BP Location: Right arm)   Pulse 73   Ht 1.626 m (5' 4\")   Wt 83.9 kg (185 lb)   SpO2 96%   BMI 31.76 kg/m       GEN:  NAD  NECK: No JVD  C/V:  Regular rate and rhythm; 3/6 DAVID at RUSB.  RESP: CTA, marcela.  GI: Abdomen soft, nontender, nondistended.   EXTREM: Trace pitting LE edema.   NEURO: Alert and oriented, cooperative. No obvious focal deficits.   PSYCH: Normal affect.  SKIN: Warm and dry.       Recent Lab Results:  LIPID RESULTS:  Lab Results   Component Value Date    CHOL 218 (H) 10/20/2021    CHOL 300 (H) 03/03/2021    HDL 86 10/20/2021     03/03/2021    LDL 85 10/20/2021     (H) 03/03/2021    TRIG 234 (H) 10/20/2021    TRIG 77 03/03/2021    CHOLHDLRATIO 1.5 07/28/2015       LIVER ENZYME RESULTS:  Lab Results   Component Value Date    AST 41 03/10/2022    AST 20 03/30/2021    ALT 23 03/10/2022    ALT 16 03/30/2021       CBC RESULTS:  Lab Results   Component Value Date    WBC 5.7 06/20/2022    WBC 5.6 03/27/2021    RBC 3.28 (L) 06/20/2022    RBC 3.64 (L) 03/27/2021    HGB 9.3 (L) " 06/20/2022    HGB 10.7 (L) 03/27/2021    HCT 29.4 (L) 06/20/2022    HCT 33.4 (L) 03/27/2021    MCV 90 06/20/2022    MCV 92 03/27/2021    MCH 28.4 06/20/2022    MCH 29.4 03/27/2021    MCHC 31.6 06/20/2022    MCHC 32.0 03/27/2021    RDW 16.1 (H) 06/20/2022    RDW 15.7 (H) 03/27/2021     06/20/2022     03/27/2021       BMP RESULTS:  Lab Results   Component Value Date     06/23/2022     05/12/2021    POTASSIUM 4.4 06/23/2022    POTASSIUM 4.0 05/12/2021    CHLORIDE 107 06/23/2022    CHLORIDE 107 05/12/2021    CO2 28 06/23/2022    CO2 30 05/12/2021    ANIONGAP 2 (L) 06/23/2022    ANIONGAP 3 05/12/2021     (H) 06/23/2022     (H) 05/12/2021    BUN 22 06/23/2022    BUN 15 05/12/2021    CR 1.12 (H) 06/23/2022    CR 1.00 05/12/2021    GFRESTIMATED 50 (L) 06/23/2022    GFRESTIMATED 54 (L) 05/12/2021    GFRESTBLACK 63 05/12/2021    BIRGIT 9.1 06/23/2022    BIRGIT 9.4 05/12/2021        A1C RESULTS:  Lab Results   Component Value Date    A1C 5.7 09/29/2010       INR RESULTS:  Lab Results   Component Value Date    INR 0.98 06/20/2022    INR 0.9 03/30/2021    INR 1.03 06/16/2017           Michael Kinsey PA-C  August 2, 2022

## 2022-08-04 DIAGNOSIS — I35.0 SEVERE AORTIC STENOSIS: Primary | ICD-10-CM

## 2022-08-04 RX ORDER — CEFAZOLIN SODIUM 2 G/100ML
2 INJECTION, SOLUTION INTRAVENOUS
Status: CANCELLED | OUTPATIENT
Start: 2022-08-04

## 2022-08-04 RX ORDER — SODIUM CHLORIDE 9 MG/ML
INJECTION, SOLUTION INTRAVENOUS CONTINUOUS
Status: CANCELLED | OUTPATIENT
Start: 2022-08-04

## 2022-08-04 RX ORDER — ASPIRIN 325 MG
325 TABLET ORAL ONCE
Status: CANCELLED | OUTPATIENT
Start: 2022-08-04

## 2022-08-04 RX ORDER — LIDOCAINE 40 MG/G
CREAM TOPICAL
Status: CANCELLED | OUTPATIENT
Start: 2022-08-04

## 2022-08-05 ENCOUNTER — LAB (OUTPATIENT)
Dept: URGENT CARE | Facility: URGENT CARE | Age: 77
End: 2022-08-05
Payer: COMMERCIAL

## 2022-08-05 DIAGNOSIS — I35.0 SEVERE AORTIC STENOSIS: ICD-10-CM

## 2022-08-05 LAB — SARS-COV-2 RNA RESP QL NAA+PROBE: NEGATIVE

## 2022-08-05 PROCEDURE — U0003 INFECTIOUS AGENT DETECTION BY NUCLEIC ACID (DNA OR RNA); SEVERE ACUTE RESPIRATORY SYNDROME CORONAVIRUS 2 (SARS-COV-2) (CORONAVIRUS DISEASE [COVID-19]), AMPLIFIED PROBE TECHNIQUE, MAKING USE OF HIGH THROUGHPUT TECHNOLOGIES AS DESCRIBED BY CMS-2020-01-R: HCPCS

## 2022-08-05 PROCEDURE — U0005 INFEC AGEN DETEC AMPLI PROBE: HCPCS

## 2022-08-08 ENCOUNTER — ANESTHESIA EVENT (OUTPATIENT)
Dept: CARDIOLOGY | Facility: CLINIC | Age: 77
DRG: 266 | End: 2022-08-08
Payer: COMMERCIAL

## 2022-08-08 RX ORDER — QUETIAPINE FUMARATE 50 MG/1
50 TABLET, FILM COATED ORAL DAILY
COMMUNITY
End: 2023-01-01

## 2022-08-08 RX ORDER — BISACODYL 5 MG/1
5 TABLET, DELAYED RELEASE ORAL DAILY PRN
COMMUNITY

## 2022-08-08 NOTE — PROGRESS NOTES
PTA medications updated by Medication Scribe prior to surgery via phone call with patient (last doses completed by Nurse)     Medication history sources: Patient, Surescripts and H&P  In the past week, patient estimated taking medication this percent of the time: Greater than 90%  Adherence assessment: N/A Not Observed    Significant changes made to the medication list:  Patient reports no longer taking the following meds (med scribe removed from PTA med list): Senna-Docusate, Methocarbamol      Additional medication history information:   None    Medication reconciliation completed by provider prior to medication history? No    Time spent in this activity: 45 minutes    The information provided in this note is only as accurate as the sources available at the time of update(s)    Prior to Admission medications    Medication Sig Last Dose Taking? Auth Provider Long Term End Date   acetaminophen (TYLENOL) 500 MG tablet Take 1,000 mg by mouth 2 times daily as needed for pain  at prn Yes Unknown, Entered By History     aspirin 81 MG EC tablet Take 81 mg by mouth daily  at am Yes Unknown, Entered By History No    bisacodyl (DULCOLAX) 5 MG EC tablet Take 5 mg by mouth daily as needed for constipation  at prn Yes Reported, Patient     folic acid (FOLVITE) 1 MG tablet Take 1 mg by mouth daily 8/8/2022 at am Yes Unknown, Entered By History     gabapentin (NEURONTIN) 600 MG tablet Take 1 tablet (600 mg) by mouth At Bedtime 8/8/2022 at pm Yes Jin Ackerman MD Yes    metoprolol succinate ER (TOPROL XL) 25 MG 24 hr tablet Take 1 tablet (25 mg) by mouth daily  at am Yes Nora Parker MD Yes    MISC NATURAL PRODUCTS PO Take 1 capsule by mouth daily *B-glucan capsule* 8/8/2022 at am Yes Unknown, Entered By History     MISC NATURAL PRODUCTS PO Take 1 tablet by mouth daily *Keto - Ketogenesis* 8/8/2022 at am Yes Unknown, Entered By History     MISC NATURAL PRODUCTS PO Take 1 tablet by mouth daily *L-Tryptophan 1000 mg*  8/8/2022 at am Yes Unknown, Entered By History     Multiple Vitamins-Minerals (CENTRUM SILVER) per tablet Take 1 tablet by mouth daily 8/8/2022 at am Yes Reported, Patient     polyethylene glycol (MIRALAX) 17 GM/Dose powder Take 17 g by mouth 2 times daily as needed for constipation  at prn Yes Unknown, Entered By History     pravastatin (PRAVACHOL) 20 MG tablet Take 1 tablet (20 mg) by mouth daily 8/8/2022 at pm Yes Nayeli Castorena PA-C Yes    QUEtiapine (SEROQUEL XR) 150 MG TB24 24 hr tablet Take 2 tablets (300 mg) by mouth At Bedtime  Patient taking differently: Take 300 mg by mouth At Bedtime (2 x 150 mg = 300 mg; in addition to 50 mg nighttime dose) 8/8/2022 at pm Yes Jin Ackerman MD Yes    QUEtiapine (SEROQUEL) 50 MG tablet Take 50 mg by mouth daily (In addition to the 300 mg nighttime dose) 8/8/2022 at pm Yes Reported, Patient Yes    traZODone (DESYREL) 100 MG tablet TAKE 1 TABLET NIGHTLY AS NEEDED FOR SLEEP  Patient taking differently: Take 100 mg by mouth At Bedtime 8/8/2022 at pm Yes Jin Ackerman MD Yes    valACYclovir (VALTREX) 1000 mg tablet Take 2,000 mg by mouth as needed (onset of cold sore)  at prn Yes Reported, Patient No      Medication history completed by:    Loy Saab CPhT  Medication [facility]

## 2022-08-09 ENCOUNTER — HOSPITAL ENCOUNTER (INPATIENT)
Facility: CLINIC | Age: 77
LOS: 2 days | Discharge: HOME | DRG: 266 | End: 2022-08-11
Attending: INTERNAL MEDICINE | Admitting: THORACIC SURGERY (CARDIOTHORACIC VASCULAR SURGERY)
Payer: COMMERCIAL

## 2022-08-09 ENCOUNTER — HOSPITAL ENCOUNTER (OUTPATIENT)
Dept: CARDIOLOGY | Facility: CLINIC | Age: 77
Discharge: HOME | DRG: 266 | End: 2022-08-09
Attending: INTERNAL MEDICINE | Admitting: INTERNAL MEDICINE
Payer: COMMERCIAL

## 2022-08-09 ENCOUNTER — ANESTHESIA (OUTPATIENT)
Dept: CARDIOLOGY | Facility: CLINIC | Age: 77
DRG: 266 | End: 2022-08-09
Payer: COMMERCIAL

## 2022-08-09 DIAGNOSIS — I35.0 SEVERE AORTIC STENOSIS: ICD-10-CM

## 2022-08-09 DIAGNOSIS — I35.0 SEVERE AORTIC STENOSIS: Primary | ICD-10-CM

## 2022-08-09 DIAGNOSIS — Z95.2 S/P TAVR (TRANSCATHETER AORTIC VALVE REPLACEMENT): ICD-10-CM

## 2022-08-09 LAB
ACT BLD: 118 SECONDS (ref 74–150)
ACT BLD: 233 SECONDS (ref 74–150)
ACT BLD: 275 SECONDS (ref 74–150)
BLD PROD TYP BPU: NORMAL
BLD PROD TYP BPU: NORMAL
BLOOD COMPONENT TYPE: NORMAL
BLOOD COMPONENT TYPE: NORMAL
CODING SYSTEM: NORMAL
CODING SYSTEM: NORMAL
CROSSMATCH: NORMAL
CROSSMATCH: NORMAL
ISSUE DATE AND TIME: NORMAL
ISSUE DATE AND TIME: NORMAL
LVEF ECHO: NORMAL
UNIT ABO/RH: NORMAL
UNIT ABO/RH: NORMAL
UNIT NUMBER: NORMAL
UNIT NUMBER: NORMAL
UNIT STATUS: NORMAL
UNIT STATUS: NORMAL
UNIT TYPE ISBT: 6200
UNIT TYPE ISBT: 6200

## 2022-08-09 PROCEDURE — 33361 REPLACE AORTIC VALVE PERQ: CPT | Mod: 62 | Performed by: THORACIC SURGERY (CARDIOTHORACIC VASCULAR SURGERY)

## 2022-08-09 PROCEDURE — 272N000001 HC OR GENERAL SUPPLY STERILE: Performed by: INTERNAL MEDICINE

## 2022-08-09 PROCEDURE — 250N000025 HC SEVOFLURANE, PER MIN: Performed by: INTERNAL MEDICINE

## 2022-08-09 PROCEDURE — 93325 DOPPLER ECHO COLOR FLOW MAPG: CPT

## 2022-08-09 PROCEDURE — 33361 REPLACE AORTIC VALVE PERQ: CPT | Performed by: INTERNAL MEDICINE

## 2022-08-09 PROCEDURE — 999N000157 HC STATISTIC RCP TIME EA 10 MIN

## 2022-08-09 PROCEDURE — C1760 CLOSURE DEV, VASC: HCPCS | Performed by: INTERNAL MEDICINE

## 2022-08-09 PROCEDURE — 99223 1ST HOSP IP/OBS HIGH 75: CPT | Mod: AI | Performed by: NURSE PRACTITIONER

## 2022-08-09 PROCEDURE — C1769 GUIDE WIRE: HCPCS | Performed by: INTERNAL MEDICINE

## 2022-08-09 PROCEDURE — 250N000009 HC RX 250: Performed by: REGISTERED NURSE

## 2022-08-09 PROCEDURE — C1894 INTRO/SHEATH, NON-LASER: HCPCS | Performed by: INTERNAL MEDICINE

## 2022-08-09 PROCEDURE — 250N000011 HC RX IP 250 OP 636: Performed by: INTERNAL MEDICINE

## 2022-08-09 PROCEDURE — 258N000003 HC RX IP 258 OP 636: Performed by: REGISTERED NURSE

## 2022-08-09 PROCEDURE — 86923 COMPATIBILITY TEST ELECTRIC: CPT | Performed by: INTERNAL MEDICINE

## 2022-08-09 PROCEDURE — 02RF38Z REPLACEMENT OF AORTIC VALVE WITH ZOOPLASTIC TISSUE, PERCUTANEOUS APPROACH: ICD-10-PCS | Performed by: INTERNAL MEDICINE

## 2022-08-09 PROCEDURE — 93321 DOPPLER ECHO F-UP/LMTD STD: CPT | Mod: 26 | Performed by: INTERNAL MEDICINE

## 2022-08-09 PROCEDURE — P9016 RBC LEUKOCYTES REDUCED: HCPCS | Performed by: INTERNAL MEDICINE

## 2022-08-09 PROCEDURE — 93005 ELECTROCARDIOGRAM TRACING: CPT

## 2022-08-09 PROCEDURE — 94002 VENT MGMT INPAT INIT DAY: CPT

## 2022-08-09 PROCEDURE — 85347 COAGULATION TIME ACTIVATED: CPT

## 2022-08-09 PROCEDURE — 370N000017 HC ANESTHESIA TECHNICAL FEE, PER MIN: Performed by: INTERNAL MEDICINE

## 2022-08-09 PROCEDURE — 250N000011 HC RX IP 250 OP 636: Performed by: REGISTERED NURSE

## 2022-08-09 PROCEDURE — 250N000009 HC RX 250: Performed by: INTERNAL MEDICINE

## 2022-08-09 PROCEDURE — 210N000002 HC R&B HEART CARE

## 2022-08-09 PROCEDURE — 250N000013 HC RX MED GY IP 250 OP 250 PS 637: Performed by: NURSE PRACTITIONER

## 2022-08-09 PROCEDURE — 258N000003 HC RX IP 258 OP 636: Performed by: INTERNAL MEDICINE

## 2022-08-09 PROCEDURE — 93325 DOPPLER ECHO COLOR FLOW MAPG: CPT | Mod: 26 | Performed by: INTERNAL MEDICINE

## 2022-08-09 PROCEDURE — 250N000009 HC RX 250: Performed by: ANESTHESIOLOGY

## 2022-08-09 PROCEDURE — C1730 CATH, EP, 19 OR FEW ELECT: HCPCS | Performed by: INTERNAL MEDICINE

## 2022-08-09 PROCEDURE — 278N000051 HC OR IMPLANT GENERAL: Performed by: INTERNAL MEDICINE

## 2022-08-09 PROCEDURE — 93321 DOPPLER ECHO F-UP/LMTD STD: CPT

## 2022-08-09 PROCEDURE — 93010 ELECTROCARDIOGRAM REPORT: CPT | Performed by: INTERNAL MEDICINE

## 2022-08-09 PROCEDURE — 250N000011 HC RX IP 250 OP 636: Performed by: ANESTHESIOLOGY

## 2022-08-09 PROCEDURE — 93308 TTE F-UP OR LMTD: CPT | Mod: 26 | Performed by: INTERNAL MEDICINE

## 2022-08-09 PROCEDURE — 33361 REPLACE AORTIC VALVE PERQ: CPT | Mod: 62 | Performed by: INTERNAL MEDICINE

## 2022-08-09 DEVICE — DEVICE CLOSURE VASCULAR MANTA 18FR 2115: Type: IMPLANTABLE DEVICE | Status: FUNCTIONAL

## 2022-08-09 DEVICE — VALVE HEART SAPIEN 3 26MM 9600TFX26A: Type: IMPLANTABLE DEVICE | Status: FUNCTIONAL

## 2022-08-09 RX ORDER — POLYETHYLENE GLYCOL 3350 17 G/17G
17 POWDER, FOR SOLUTION ORAL 2 TIMES DAILY PRN
Status: DISCONTINUED | OUTPATIENT
Start: 2022-08-09 | End: 2022-08-11 | Stop reason: HOSPADM

## 2022-08-09 RX ORDER — FENTANYL CITRATE 50 UG/ML
25 INJECTION, SOLUTION INTRAMUSCULAR; INTRAVENOUS EVERY 5 MIN PRN
Status: DISCONTINUED | OUTPATIENT
Start: 2022-08-09 | End: 2022-08-09 | Stop reason: HOSPADM

## 2022-08-09 RX ORDER — NITROGLYCERIN 0.4 MG/1
0.4 TABLET SUBLINGUAL EVERY 5 MIN PRN
Status: DISCONTINUED | OUTPATIENT
Start: 2022-08-09 | End: 2022-08-11 | Stop reason: HOSPADM

## 2022-08-09 RX ORDER — SODIUM CHLORIDE 9 MG/ML
INJECTION, SOLUTION INTRAVENOUS CONTINUOUS
Status: DISCONTINUED | OUTPATIENT
Start: 2022-08-09 | End: 2022-08-09 | Stop reason: HOSPADM

## 2022-08-09 RX ORDER — PROPOFOL 10 MG/ML
INJECTION, EMULSION INTRAVENOUS CONTINUOUS PRN
Status: DISCONTINUED | OUTPATIENT
Start: 2022-08-09 | End: 2022-08-09

## 2022-08-09 RX ORDER — SODIUM CHLORIDE, SODIUM LACTATE, POTASSIUM CHLORIDE, CALCIUM CHLORIDE 600; 310; 30; 20 MG/100ML; MG/100ML; MG/100ML; MG/100ML
INJECTION, SOLUTION INTRAVENOUS CONTINUOUS
Status: DISCONTINUED | OUTPATIENT
Start: 2022-08-09 | End: 2022-08-09 | Stop reason: HOSPADM

## 2022-08-09 RX ORDER — PHENYLEPHRINE HCL IN 0.9% NACL 50MG/250ML
.1-6 PLASTIC BAG, INJECTION (ML) INTRAVENOUS CONTINUOUS
Status: DISCONTINUED | OUTPATIENT
Start: 2022-08-09 | End: 2022-08-09 | Stop reason: HOSPADM

## 2022-08-09 RX ORDER — ACETAMINOPHEN 325 MG/1
650 TABLET ORAL EVERY 4 HOURS PRN
Status: DISCONTINUED | OUTPATIENT
Start: 2022-08-09 | End: 2022-08-11 | Stop reason: HOSPADM

## 2022-08-09 RX ORDER — CEFAZOLIN SODIUM/WATER 2 G/20 ML
2 SYRINGE (ML) INTRAVENOUS
Status: COMPLETED | OUTPATIENT
Start: 2022-08-09 | End: 2022-08-09

## 2022-08-09 RX ORDER — PROPOFOL 10 MG/ML
INJECTION, EMULSION INTRAVENOUS PRN
Status: DISCONTINUED | OUTPATIENT
Start: 2022-08-09 | End: 2022-08-09

## 2022-08-09 RX ORDER — ONDANSETRON 2 MG/ML
4 INJECTION INTRAMUSCULAR; INTRAVENOUS EVERY 30 MIN PRN
Status: DISCONTINUED | OUTPATIENT
Start: 2022-08-09 | End: 2022-08-09 | Stop reason: HOSPADM

## 2022-08-09 RX ORDER — ASPIRIN 81 MG/1
81 TABLET ORAL DAILY
Status: DISCONTINUED | OUTPATIENT
Start: 2022-08-10 | End: 2022-08-09

## 2022-08-09 RX ORDER — PRAVASTATIN SODIUM 20 MG
20 TABLET ORAL EVERY EVENING
Status: DISCONTINUED | OUTPATIENT
Start: 2022-08-09 | End: 2022-08-11 | Stop reason: HOSPADM

## 2022-08-09 RX ORDER — OXYCODONE HYDROCHLORIDE 5 MG/1
5 TABLET ORAL EVERY 4 HOURS PRN
Status: DISCONTINUED | OUTPATIENT
Start: 2022-08-09 | End: 2022-08-09 | Stop reason: HOSPADM

## 2022-08-09 RX ORDER — PROTAMINE SULFATE 10 MG/ML
INJECTION, SOLUTION INTRAVENOUS PRN
Status: DISCONTINUED | OUTPATIENT
Start: 2022-08-09 | End: 2022-08-09

## 2022-08-09 RX ORDER — FOLIC ACID 1 MG/1
1 TABLET ORAL DAILY
Status: DISCONTINUED | OUTPATIENT
Start: 2022-08-09 | End: 2022-08-11 | Stop reason: HOSPADM

## 2022-08-09 RX ORDER — QUETIAPINE FUMARATE 50 MG/1
50 TABLET, FILM COATED ORAL AT BEDTIME
Status: DISCONTINUED | OUTPATIENT
Start: 2022-08-09 | End: 2022-08-11 | Stop reason: HOSPADM

## 2022-08-09 RX ORDER — HEPARIN SODIUM 1000 [USP'U]/ML
INJECTION, SOLUTION INTRAVENOUS; SUBCUTANEOUS PRN
Status: DISCONTINUED | OUTPATIENT
Start: 2022-08-09 | End: 2022-08-09

## 2022-08-09 RX ORDER — HYDRALAZINE HYDROCHLORIDE 20 MG/ML
10 INJECTION INTRAMUSCULAR; INTRAVENOUS
Status: DISCONTINUED | OUTPATIENT
Start: 2022-08-09 | End: 2022-08-11 | Stop reason: HOSPADM

## 2022-08-09 RX ORDER — ASPIRIN 81 MG/1
81 TABLET ORAL DAILY
Status: DISCONTINUED | OUTPATIENT
Start: 2022-08-10 | End: 2022-08-11 | Stop reason: HOSPADM

## 2022-08-09 RX ORDER — PROPOFOL 10 MG/ML
5-75 INJECTION, EMULSION INTRAVENOUS CONTINUOUS
Status: DISCONTINUED | OUTPATIENT
Start: 2022-08-09 | End: 2022-08-09 | Stop reason: HOSPADM

## 2022-08-09 RX ORDER — ASPIRIN 325 MG
325 TABLET ORAL ONCE
Status: DISCONTINUED | OUTPATIENT
Start: 2022-08-09 | End: 2022-08-09 | Stop reason: HOSPADM

## 2022-08-09 RX ORDER — LIDOCAINE 40 MG/G
CREAM TOPICAL
Status: DISCONTINUED | OUTPATIENT
Start: 2022-08-09 | End: 2022-08-09 | Stop reason: HOSPADM

## 2022-08-09 RX ORDER — ONDANSETRON 4 MG/1
4 TABLET, ORALLY DISINTEGRATING ORAL EVERY 30 MIN PRN
Status: DISCONTINUED | OUTPATIENT
Start: 2022-08-09 | End: 2022-08-09 | Stop reason: HOSPADM

## 2022-08-09 RX ORDER — BISACODYL 5 MG
5 TABLET, DELAYED RELEASE (ENTERIC COATED) ORAL DAILY PRN
Status: DISCONTINUED | OUTPATIENT
Start: 2022-08-09 | End: 2022-08-11 | Stop reason: HOSPADM

## 2022-08-09 RX ORDER — GABAPENTIN 600 MG/1
600 TABLET ORAL AT BEDTIME
Status: DISCONTINUED | OUTPATIENT
Start: 2022-08-09 | End: 2022-08-11 | Stop reason: HOSPADM

## 2022-08-09 RX ORDER — QUETIAPINE 300 MG/1
300 TABLET, FILM COATED, EXTENDED RELEASE ORAL AT BEDTIME
Status: DISCONTINUED | OUTPATIENT
Start: 2022-08-09 | End: 2022-08-11 | Stop reason: HOSPADM

## 2022-08-09 RX ORDER — HYDROMORPHONE HYDROCHLORIDE 1 MG/ML
0.5 INJECTION, SOLUTION INTRAMUSCULAR; INTRAVENOUS; SUBCUTANEOUS EVERY 5 MIN PRN
Status: DISCONTINUED | OUTPATIENT
Start: 2022-08-09 | End: 2022-08-09 | Stop reason: HOSPADM

## 2022-08-09 RX ADMIN — PROTAMINE SULFATE 80 MG: 10 INJECTION, SOLUTION INTRAVENOUS at 12:26

## 2022-08-09 RX ADMIN — HYDRALAZINE HYDROCHLORIDE 10 MG: 20 INJECTION, SOLUTION INTRAMUSCULAR; INTRAVENOUS at 22:07

## 2022-08-09 RX ADMIN — SUCCINYLCHOLINE CHLORIDE 80 MG: 20 INJECTION, SOLUTION INTRAMUSCULAR; INTRAVENOUS; PARENTERAL at 12:49

## 2022-08-09 RX ADMIN — SODIUM CHLORIDE: 9 INJECTION, SOLUTION INTRAVENOUS at 10:55

## 2022-08-09 RX ADMIN — PROPOFOL 30 MCG/KG/MIN: 10 INJECTION, EMULSION INTRAVENOUS at 10:55

## 2022-08-09 RX ADMIN — PROPOFOL 75 MCG/KG/MIN: 10 INJECTION, EMULSION INTRAVENOUS at 13:10

## 2022-08-09 RX ADMIN — PRAVASTATIN SODIUM 20 MG: 20 TABLET ORAL at 20:18

## 2022-08-09 RX ADMIN — DEXMEDETOMIDINE HYDROCHLORIDE 4 MCG: 100 INJECTION, SOLUTION INTRAVENOUS at 11:39

## 2022-08-09 RX ADMIN — DEXMEDETOMIDINE HYDROCHLORIDE 1 MCG/KG/HR: 100 INJECTION, SOLUTION INTRAVENOUS at 10:55

## 2022-08-09 RX ADMIN — Medication 0.2 MCG/KG/MIN: at 14:28

## 2022-08-09 RX ADMIN — DEXMEDETOMIDINE HYDROCHLORIDE 12 MCG: 100 INJECTION, SOLUTION INTRAVENOUS at 10:55

## 2022-08-09 RX ADMIN — ROCURONIUM BROMIDE 20 MG: 50 INJECTION, SOLUTION INTRAVENOUS at 13:11

## 2022-08-09 RX ADMIN — DEXMEDETOMIDINE HYDROCHLORIDE 4 MCG: 100 INJECTION, SOLUTION INTRAVENOUS at 11:43

## 2022-08-09 RX ADMIN — FOLIC ACID 1 MG: 1 TABLET ORAL at 20:18

## 2022-08-09 RX ADMIN — PROPOFOL 20 MG: 10 INJECTION, EMULSION INTRAVENOUS at 11:38

## 2022-08-09 RX ADMIN — HEPARIN SODIUM 13000 UNITS: 1000 INJECTION INTRAVENOUS; SUBCUTANEOUS at 11:48

## 2022-08-09 RX ADMIN — SODIUM CHLORIDE: 9 INJECTION, SOLUTION INTRAVENOUS at 12:18

## 2022-08-09 RX ADMIN — PROPOFOL 20 MG: 10 INJECTION, EMULSION INTRAVENOUS at 11:39

## 2022-08-09 RX ADMIN — FENTANYL CITRATE 25 MCG: 50 INJECTION, SOLUTION INTRAMUSCULAR; INTRAVENOUS at 13:50

## 2022-08-09 RX ADMIN — FENTANYL CITRATE 25 MCG: 50 INJECTION, SOLUTION INTRAMUSCULAR; INTRAVENOUS at 14:00

## 2022-08-09 RX ADMIN — Medication 2 G: at 10:56

## 2022-08-09 RX ADMIN — HEPARIN SODIUM 4000 UNITS: 1000 INJECTION INTRAVENOUS; SUBCUTANEOUS at 12:00

## 2022-08-09 RX ADMIN — PHENYLEPHRINE HYDROCHLORIDE 150 MCG: 10 INJECTION INTRAVENOUS at 12:23

## 2022-08-09 RX ADMIN — PHENYLEPHRINE HYDROCHLORIDE 100 MCG: 10 INJECTION INTRAVENOUS at 11:21

## 2022-08-09 RX ADMIN — PHENYLEPHRINE HYDROCHLORIDE 0.6 MCG/KG/MIN: 10 INJECTION INTRAVENOUS at 12:39

## 2022-08-09 ASSESSMENT — COPD QUESTIONNAIRES: COPD: 0

## 2022-08-09 ASSESSMENT — ACTIVITIES OF DAILY LIVING (ADL)
ADLS_ACUITY_SCORE: 22

## 2022-08-09 ASSESSMENT — LIFESTYLE VARIABLES: TOBACCO_USE: 0

## 2022-08-09 NOTE — PLAN OF CARE
VSS, no c/o pain, bilateral groin sites CDI, no hematomas, pedal pulses intact.  Neuros intact.  Off bedrest at 1900, continue to monitor closely.

## 2022-08-09 NOTE — BRIEF OP NOTE
[facility]    Brief Operative Note    Pre-operative diagnosis:  Aortic stenosis  Post-operative diagnosis: Same    Procedure: Right transfemoral transcatheter aortic valve replacement using a 23 mm Payne Kaleigh 3 Ultra valve at [facility] plus one  Surgeon:  Marily Sprague MD  Interventional Cardiologists:  Nora Parker MD and Rojas Christiansen MD  Anesthesia: Monitor Anesthesia Care, then placement of an LMA   Estimated Blood Loss: 100 mL  Drains: None  Specimens: None  Findings:  Gradient of 6 to 7 mm Hg after valve deployment.  No change in trivial pericardial effusion.  Tapering of the right femoral artery after deployment of Manta device.  Complications: None  Implants:   23 mm Payne Kaleigh 3 Ultra valve  Manta device.

## 2022-08-09 NOTE — H&P
"[facility]    History and Physical - Hospitalist Service       Date of Admission:  8/9/2022    Assessment & Plan      Kavitha Headley is a 76 year old female admitted on 8/9/2022. She is admitted for a TAVR. She has a past medical history significant for severe aortic stenosis, mild nonobstructive coronary disease, hx of alcohol use disorder s/p multiple rehab admissions and interventions (has been sober for 7 months with no withdrawal symptoms), HFpEF, mildly dilated ascending aorta measuring 4.1 cm, paroxysmal SVT and PACs, hypertension, hyperlipidemia, CKD, and obesity s/p gastric bypass surgery.     S/P TAVR on 8/9 with a 23mm Payne valve   History of severe AORTIC STENOSIS   Mild CAD  HFpEF  Dilated ascending aorta measuring 4.1 cm  SVT and PACs  HTN  HLP  -Per cardiology  -EBL 1000mL   - Repeat CBC in AM   - Transfuse for HGB less than 7    History of ETOH abuse   -Sober x 7 months  -Monitor for now    CKD stage III  -Baseline creat 1.2-1.4  -Stable    S/P gasrtic bypass  Obesity  -Noted     Insomnia   Anxiety  -Resume home Seroquel   -Last QTc 532 ms     Diet: NPO for Medical/Clinical Reasons Except for: Other; Specify: NPO after midnight OR 8 hours prior to the procedure.    DVT Prophylaxis: Per cardiology   Montelongo Catheter: Not present  Central Lines: None  Cardiac Monitoring: None  Code Status:   Full Code     Clinically Significant Risk Factors Present on Admission                    # Obesity: Estimated body mass index is 31.76 kg/m  as calculated from the following:    Height as of 8/2/22: 1.626 m (5' 4\").    Weight as of 8/2/22: 83.9 kg (185 lb).        Disposition Plan      Expected Discharge Date: 08/11/2022      Destination: home        The patient's care was discussed with Dr. Kev GUEVARA NP  Hospitalist Service  [facility]  Securely message with the Vocera Web Console (learn more here)  Text page via [employer] Paging/Directory "     ______________________________________________________________________    Chief Complaint   Planned TAVR    History is obtained from the electronic health record    History of Present Illness   Kavitha Headley is a 76 year old female who presented for planned TAVR. She had an echo on 7/8/22 that showed Severe aortic stenosis is present. Peak velocity is 3.9 m/s, mean gradient 41 mmHg. She has a past medical history significant for severe aortic stenosis, mild nonobstructive coronary disease, hx of alcohol use disorder s/p multiple rehab admissions and interventions (has been sober for 7 months with no withdrawal symptoms), HFpEF, mildly dilated ascending aorta measuring 4.1 cm, paroxysmal SVT and PACs, hypertension, hyperlipidemia, CKD, and obesity s/p gastric bypass surgery.    She was evaluated in PACU post TAVR. She was intubated and sedated. According to the PACU RN, she was not able to tolerate to procedure due to agitation and required intubation.     Review of Systems    Not able to complete the ROS due to being mechanically intubated.     Past Medical History    I have reviewed this patient's medical history and updated it with pertinent information if needed.   Past Medical History:   Diagnosis Date     Alcohol abuse      Anxiety disorder      Ascending aorta dilatation (H)      Bariatric surgery status 1996?    gastric bypass, [facility] and     Benign hypertension      Chronic insomnia      Chronic pain syndrome     Chronic back and neck pain, chronic pain due to osteoarthritis multiple joints     Coronary artery disease involving native coronary artery of native heart without angina pectoris 10/16/2018    Minimal coronary artery disease on coronary angiogram in 2015.      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     Major depressive disorder, single episode, severe, without mention of psychotic behavior      Mixed hyperlipidemia      Pelvic relaxation disorder     Surgical intervention for cystocele/rectocele 3,11/2012     Personal history of urinary calculi 06/2006    left ureteral stone,lithotripsy     Psoriasis      PVC (premature ventricular contraction)      Severe aortic stenosis      Spinal stenosis      Stage III chronic kidney disease (H) 2005     SVT (supraventricular tachycardia) (H)     likely atrial tachycardia       Past Surgical History   I have reviewed this patient's surgical history and updated it with pertinent information if needed.  Past Surgical History:   Procedure Laterality Date     APPENDECTOMY  3/2004    incidental     ARTHRODESIS TOE(S) Right 1/31/2020    Procedure: RIGHT FIRST METATARSAL PHALANGEAL JOINT ARTHRODESIS;  Surgeon: Steven Reyes MD;  Location:  OR     C MEDIASTINOSCOPY W OR WO BIOPSY  2/2008    Videomediastinoscopy and, for mediastinal adenopathy -reactive lymphoid hyperplasia     CARPAL TUNNEL RELEASE RT/LT  10/2010    Carpometacarpal excisional arthroplasty with a fascial autograft and APL suspension sling (83905). 2. Left thumb metacarpophalangeal joint fusion with autologous bone graft ([id number]). 3. Left endoscopic carpal tunnel release      CHOLECYSTECTOMY, LAPOROSCOPIC  11/2010    Cholecystectomy, Laparoscopic     COLONOSCOPY N/A 9/8/2016    Procedure: COMBINED COLONOSCOPY, SINGLE OR MULTIPLE BIOPSY/POLYPECTOMY BY BIOPSY;  Surgeon: Moe Barlow MD;  Location:  GI     CV CORONARY ANGIOGRAM N/A 6/20/2022    Procedure: Coronary Angiogram;  Surgeon: Nora Parker MD;  Location:  HEART CARDIAC CATH LAB     CV PCI N/A 6/20/2022    Procedure: Percutaneous Coronary Intervention;  Surgeon: Nora Parker MD;  Location:  HEART CARDIAC CATH LAB     CV RIGHT HEART CATH MEASUREMENTS RECORDED N/A  6/20/2022    Procedure: Right Heart Catheterization;  Surgeon: Nora Parker MD;  Location:  HEART CARDIAC CATH LAB     CYSTOCELE REPAIR  11/2012    davinci laparoscopic sacrocolpopexy, enterocele repair, lysis of adhesions, placement of retropubic mid urethral sling, cystoscopy     CYSTOSCOPY, LITHOTRIPSY, COMBINED  6/2006    Left extracorporeal shock wave lithotripsy, cystoscopy, left ureteral stent placement.     CYSTOSCOPY, REMOVE STENT(S), COMBINED  7/2006    Cystoscopy, removal of left ureteral stent, retrograde pyelography, flexible and rigid ureteroscopy and holmium laser lithotripsy, basket removal of stone fragments, ureteral stent placement.      ENDOSCOPIC ULTRASOUND UPPER GASTROINTESTINAL TRACT (GI) N/A 6/12/2017    Procedure: ENDOSCOPIC ULTRASOUND, ESOPHAGOSCOPY / UPPER GASTROINTESTINAL TRACT (GI);  ENDOSCOPIC ULTRASOUND, ESOPHAGOSCOPY / UPPER GASTROINTESTINAL TRACT (GI);  Surgeon: Parth Graham MD;  Location:  GI     HERNIA REPAIR  4/2012    bilateral augmentation mastopexy, ventral hernia repair, and medial thigh liposuction on 04/06/2012.      HYSTERECTOMY VAGINAL, BILATERAL SALPINGO-OOPHERECTOMY, COMBINED  1998    due to myoma and bleeding     JOINT REPLACEMENT, HIP RT/LT  4/2004    right total hip arthroplasty     LAPAROTOMY, LYSIS ADHESIONS, COMBINED  3/2004    lysis adhesions, ventral hernia repair, appendectomy incidentally     LYMPH NODE BIOPSY  4/2008    right axillary, reactive follicular and paracortical hyperplasia.     MAMMOPLASTY AUGMENTATION BILATERAL  4/2012     REPAIR HAMMER TOE Right 1/31/2020    Procedure: WITH SECOND AND THIRD CLAW TOE RECONSTRUCTION;  Surgeon: Steven Reyes MD;  Location:  OR     REVISE RECONSTRUCTED BREAST  6/7/2012    Left breast capsulotomy.      [facility] GASTRIC BYPASS,OBESE<100CM ARIANNA-EN-Y  1996     [facility] REPAIR OF RECTOCELE  3/2012     [facility] TOTAL KNEE ARTHROPLASTY  12/2005    left        Social History   I have reviewed this patient's social  history and updated it with pertinent information if needed.  Social History     Tobacco Use     Smoking status: Never Smoker     Smokeless tobacco: Never Used   Substance Use Topics     Alcohol use: Not Currently     Alcohol/week: 63.0 standard drinks     Types: 63 Standard drinks or equivalent per week     Drug use: No       Family History   I have reviewed this patient's family history and updated it with pertinent information if needed.  Family History   Problem Relation Age of Onset     Substance Abuse Father      Cancer Father         throat and lung mets     Diabetes No family hx of      Coronary Artery Disease No family hx of      Cerebrovascular Disease No family hx of        Prior to Admission Medications   Prior to Admission Medications   Prescriptions Last Dose Informant Patient Reported? Taking?   MISC NATURAL PRODUCTS PO 8/8/2022 at am Self Yes Yes   Sig: Take 1 capsule by mouth daily *B-glucan capsule*   MISC NATURAL PRODUCTS PO 8/8/2022 at am Self Yes Yes   Sig: Take 1 tablet by mouth daily *Keto - Ketogenesis*   MISC NATURAL PRODUCTS PO 8/8/2022 at am Self Yes Yes   Sig: Take 1 tablet by mouth daily *L-Tryptophan 1000 mg*   Multiple Vitamins-Minerals (CENTRUM SILVER) per tablet 8/8/2022 at am Self Yes Yes   Sig: Take 1 tablet by mouth daily   QUEtiapine (SEROQUEL XR) 150 MG TB24 24 hr tablet 8/8/2022 at pm Self No Yes   Sig: Take 2 tablets (300 mg) by mouth At Bedtime   Patient taking differently: Take 300 mg by mouth At Bedtime (2 x 150 mg = 300 mg; in addition to 50 mg nighttime dose)   QUEtiapine (SEROQUEL) 50 MG tablet 8/8/2022 at pm Self Yes Yes   Sig: Take 50 mg by mouth daily (In addition to the 300 mg nighttime dose)   acetaminophen (TYLENOL) 500 MG tablet 8/8/2022 at prn Self Yes Yes   Sig: Take 1,000 mg by mouth 2 times daily as needed for pain   aspirin 81 MG EC tablet 8/9/2022 at 0700 Self Yes Yes   Sig: Take 81 mg by mouth daily   bisacodyl (DULCOLAX) 5 MG EC tablet Past Week at prn  Self Yes Yes   Sig: Take 5 mg by mouth daily as needed for constipation   folic acid (FOLVITE) 1 MG tablet 8/8/2022 at am Self Yes Yes   Sig: Take 1 mg by mouth daily   gabapentin (NEURONTIN) 600 MG tablet 8/8/2022 at pm Self No Yes   Sig: Take 1 tablet (600 mg) by mouth At Bedtime   metoprolol succinate ER (TOPROL XL) 25 MG 24 hr tablet 8/9/2022 at am Self No Yes   Sig: Take 1 tablet (25 mg) by mouth daily   polyethylene glycol (MIRALAX) 17 GM/Dose powder Past Month at prn Self Yes Yes   Sig: Take 17 g by mouth 2 times daily as needed for constipation   pravastatin (PRAVACHOL) 20 MG tablet 8/8/2022 at pm Self No Yes   Sig: Take 1 tablet (20 mg) by mouth daily   traZODone (DESYREL) 100 MG tablet 8/8/2022 at pm Self No Yes   Sig: TAKE 1 TABLET NIGHTLY AS NEEDED FOR SLEEP   Patient taking differently: Take 100 mg by mouth At Bedtime   valACYclovir (VALTREX) 1000 mg tablet More than a month at prn Self Yes Yes   Sig: Take 2,000 mg by mouth as needed (onset of cold sore)      Facility-Administered Medications: None     Allergies   Allergies   Allergen Reactions     Bactrim [Sulfamethoxazole W/Trimethoprim] Hives     Codeine Itching     NAUSEA     Morphine Itching     NAUSEA       Physical Exam   Vital Signs: Temp: 98.2  F (36.8  C) Temp src: Temporal BP: 136/83 Pulse: 77   Resp: 14 SpO2: 96 %      Weight: 0 lbs 0 oz    Constitutional: Sedated and appears comfortable.   Head: Normocephalic. Atraumatic   Neck: Neck supple.   ENT: Trachea is midline. ETT present  Cardiovascular: negative, PMI normal. RRR. No murmurs, clicks gallops or rub  Respiratory: negative. Lungs clear  Gastrointestinal: Abdomen soft. BS normal.   : Deferred  Musculoskeletal: Moves extremities equal. No edema in lower extremities.   Skin: no suspicious lesions or rashes  Neurologic: Sedated.   Psychiatric: Sedated   Hematologic/Lymphatic/Immunologic: Normal cervical lymph nodes      Data   Recent Labs   Lab 08/02/22  1640 08/02/22  1629   WBC  --   6.0   HGB  --  10.6*   MCV  --  94   PLT  --  340   INR 0.95  --      --    POTASSIUM 4.9  --    CHLORIDE 103  --    CO2 30  --    BUN 22  --    CR 1.07*  --    ANIONGAP 4  --    BIRGIT 9.3  --    *  --      No results found for this or any previous visit (from the past 24 hour(s)).

## 2022-08-09 NOTE — Clinical Note
Khalif Holland  : 1950  69 year oldmale    Operation/Procedure:  Cystoscopy, clot evacuation, remove small bladder tumor in the neck of a bladder diverticulum, fulguration of multiple small bleeding sites  Post-op Days: 1    Subjective:   This patient is seen in followup for hematuria after TAVR.  Since last seen the following has occurred:  Surgery yesterday successful and a dressing bleeding sites.  Continuous irrigation required through the night, hand irrigation for clots.    Objective:   Vitals:    19 2110   BP:    Pulse:    Resp:    Temp: 98.2 °F (36.8 °C)       Examination of the patient reveals:   GENERAL: appears stated age, well developed and well nourished, in no distress and normal affect  GENITALS/MALE: normal circumcised phallus, normal urethral meatus, normal scrotum, both testes descended, both testes normal size and symmetric, normal external inguinal ring and proximal canal, no palpable inguinal hernia, no inguinal adenopathy and ABNORMAL FINDINGS>>  Pink urine, rapid CBI, no clots    Data:  Lab Results   Component Value Date    SODIUM 138 2019    POTASSIUM 4.1 2019    POTASSIUM 4.1 2019    BUN 15 2019    CREATININE 0.84 2019    WBC 8.1 2019    HCT 42.9 2019    HGB 14.3 2019    INR 1.2 2019    RAPDTR 0.06 (HH) 2019    GLUCOSE 103 (H) 2019    TSH 3.271 2019    CHOLESTEROL 153 2019    HDL 44 2019    CALCLDL 92 2019    TRIGLYCERIDE 86 2019    MG 2.4 2019       WBC (K/mcL)   Date Value   2019 8.1     RBC (mil/mcL)   Date Value   2019 4.63     HCT (%)   Date Value   2019 42.9     HGB (g/dL)   Date Value   2019 14.3     PLT (K/mcL)   Date Value   2019 141       I/O last 3 completed shifts:  In: 54975 [P.O.:480; Other:03608]  Out: 05302 [Urine:3175; Other:63597]    No intake/output data recorded.    Problem list:  Patient Active Problem List   Diagnosis   •  Pre closure device inserted.   Aortic regurgitation   • Dyspnea   • NSVT (nonsustained ventricular tachycardia) (CMS/HCC)   • Paroxysmal atrial fibrillation (CMS/HCC)   • S/P TAVR (transcatheter aortic valve replacement)     Assessment:   Aortic regurgitation [I35.1]  Gross hematuria  Bladder diverticulum  Small bladder tumor, removed  Anticoagulation requirement    Plan/Recommendation:  Continue present management  NPO until seen by a nurse practitioner.    Darren Yee MD  Pager: [phone]  Office: [phone]  : Margaret [phone]

## 2022-08-09 NOTE — ANESTHESIA PROCEDURE NOTES
Airway         Procedure Start/Stop Times: 8/9/2022 12:50 PM  Staff -        Anesthesiologist:  Umesh Loza MD       CRNA: Ronak Chew APRN CRNA       Performed By: CRNA  Consent for Airway        Urgency: elective  Indications and Patient Condition       Indications for airway management: jann-procedural       Induction type:RSI       Mask difficulty assessment: 0 - not attempted    Final Airway Details       Final airway type: endotracheal airway       Successful airway: Single subglottic suction  Endotracheal Airway Details        ETT size (mm): 7.0       Cuffed: yes       Cuff volume (mL): 8       Successful intubation technique: video laryngoscopy       VL Blade Size: Fuentes 3       Grade View of Cords: 1       Adjucts: stylet       Position: Right       Measured from: lips       Secured at (cm): 21       Bite block used: None    Post intubation assessment        Placement verified by: capnometry, equal breath sounds and chest rise        Number of attempts at approach: 1       Number of other approaches attempted: 0       Secured with: plastic tape       Ease of procedure: easy       Dentition: Intact and Unchanged    Medication(s) Administered   Medication Administration Time: 8/9/2022 12:50 PM

## 2022-08-09 NOTE — ANESTHESIA PROCEDURE NOTES
Airway         Procedure Start/Stop Times: 8/9/2022 12:21 PM  Staff -        Anesthesiologist:  Umesh Loza MD       CRNA: Ronak Chew APRN CRNA       Performed By: CRNA  Consent for Airway        Urgency: elective  Indications and Patient Condition       Indications for airway management: airway protection, altered level of consciousness and jann-procedural       Induction type:intravenous       Mask difficulty assessment: 1 - vent by mask    Final Airway Details       Final airway type: supraglottic airway    Supraglottic Airway Details        Type: LMA       Brand: Ambu AuraGain       LMA size: 4       Cuff Pressure (cm H2O): 25    Post intubation assessment        Placement verified by: capnometry, equal breath sounds and chest rise        Number of attempts at approach: 1       Number of other approaches attempted: 0       Secured with: pink tape and plastic tape       Ease of procedure: easy       Dentition: Intact and Unchanged    Medication(s) Administered   Medication Administration Time: 8/9/2022 12:21 PM

## 2022-08-09 NOTE — INTERVAL H&P NOTE
"I have reviewed the surgical (or preoperative) H&P that is linked to this encounter, and examined the patient. There are no significant changes    Clinical Conditions Present on Arrival:  Clinically Significant Risk Factors Present on Admission                   # Obesity: Estimated body mass index is 31.76 kg/m  as calculated from the following:    Height as of 8/2/22: 1.626 m (5' 4\").    Weight as of 8/2/22: 83.9 kg (185 lb).       "

## 2022-08-09 NOTE — ANESTHESIA PREPROCEDURE EVALUATION
Anesthesia Pre-Procedure Evaluation    Patient: Kavitha Headley   MRN: [id number] : 1945        Procedure : Procedure(s):  Transcatheter Aortic Valve Replacement-Femoral Approach          Past Medical History:   Diagnosis Date     Alcohol abuse      Anxiety disorder      Ascending aorta dilatation (H)      Bariatric surgery status ?    gastric bypass, [facility] and     Benign hypertension      Chronic insomnia      Chronic pain syndrome     Chronic back and neck pain, chronic pain due to osteoarthritis multiple joints     Coronary artery disease involving native coronary artery of native heart without angina pectoris 10/16/2018    Minimal coronary artery disease on coronary angiogram in .      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     Major depressive disorder, single episode, severe, without mention of psychotic behavior      Mixed hyperlipidemia      Pelvic relaxation disorder     Surgical intervention for cystocele/rectocele 3,2012     Personal history of urinary calculi 2006    left ureteral stone,lithotripsy     Psoriasis      PVC (premature ventricular contraction)      Severe aortic stenosis      Spinal stenosis      Stage III chronic kidney disease (H)      SVT (supraventricular tachycardia) (H)     likely atrial tachycardia      Past Surgical History:   Procedure Laterality Date     APPENDECTOMY  3/2004    incidental     ARTHRODESIS TOE(S) Right 2020    Procedure: RIGHT FIRST METATARSAL PHALANGEAL JOINT ARTHRODESIS;  Surgeon: Stveen Reyes MD;  Location: [facility] OR     C MEDIASTINOSCOPY W OR WO BIOPSY  2008    Videomediastinoscopy and, for mediastinal adenopathy -reactive lymphoid hyperplasia     CARPAL TUNNEL RELEASE  RT/LT  10/2010    Carpometacarpal excisional arthroplasty with a fascial autograft and APL suspension sling (49954). 2. Left thumb metacarpophalangeal joint fusion with autologous bone graft (18076). 3. Left endoscopic carpal tunnel release      CHOLECYSTECTOMY, LAPOROSCOPIC  11/2010    Cholecystectomy, Laparoscopic     COLONOSCOPY N/A 9/8/2016    Procedure: COMBINED COLONOSCOPY, SINGLE OR MULTIPLE BIOPSY/POLYPECTOMY BY BIOPSY;  Surgeon: Moe Barlow MD;  Location:  GI     CV CORONARY ANGIOGRAM N/A 6/20/2022    Procedure: Coronary Angiogram;  Surgeon: Nora Parker MD;  Location:  HEART CARDIAC CATH LAB     CV PCI N/A 6/20/2022    Procedure: Percutaneous Coronary Intervention;  Surgeon: Nora Parker MD;  Location:  HEART CARDIAC CATH LAB     CV RIGHT HEART CATH MEASUREMENTS RECORDED N/A 6/20/2022    Procedure: Right Heart Catheterization;  Surgeon: Nora Parker MD;  Location:  HEART CARDIAC CATH LAB     CYSTOCELE REPAIR  11/2012    davinci laparoscopic sacrocolpopexy, enterocele repair, lysis of adhesions, placement of retropubic mid urethral sling, cystoscopy     CYSTOSCOPY, LITHOTRIPSY, COMBINED  6/2006    Left extracorporeal shock wave lithotripsy, cystoscopy, left ureteral stent placement.     CYSTOSCOPY, REMOVE STENT(S), COMBINED  7/2006    Cystoscopy, removal of left ureteral stent, retrograde pyelography, flexible and rigid ureteroscopy and holmium laser lithotripsy, basket removal of stone fragments, ureteral stent placement.      ENDOSCOPIC ULTRASOUND UPPER GASTROINTESTINAL TRACT (GI) N/A 6/12/2017    Procedure: ENDOSCOPIC ULTRASOUND, ESOPHAGOSCOPY / UPPER GASTROINTESTINAL TRACT (GI);  ENDOSCOPIC ULTRASOUND, ESOPHAGOSCOPY / UPPER GASTROINTESTINAL TRACT (GI);  Surgeon: Parth Graham MD;  Location:  GI     HERNIA REPAIR  4/2012    bilateral augmentation mastopexy, ventral hernia repair, and medial thigh liposuction on 04/06/2012.      HYSTERECTOMY VAGINAL, BILATERAL  SALPINGO-OOPHERECTOMY, COMBINED  1998    due to myoma and bleeding     JOINT REPLACEMENT, HIP RT/LT  4/2004    right total hip arthroplasty     LAPAROTOMY, LYSIS ADHESIONS, COMBINED  3/2004    lysis adhesions, ventral hernia repair, appendectomy incidentally     LYMPH NODE BIOPSY  4/2008    right axillary, reactive follicular and paracortical hyperplasia.     MAMMOPLASTY AUGMENTATION BILATERAL  4/2012     REPAIR HAMMER TOE Right 1/31/2020    Procedure: WITH SECOND AND THIRD CLAW TOE RECONSTRUCTION;  Surgeon: Steven Reyes MD;  Location: [facility] OR     REVISE RECONSTRUCTED BREAST  6/7/2012    Left breast capsulotomy.      ZZC GASTRIC BYPASS,OBESE<100CM ARIANNA-EN-Y  1996     Z REPAIR OF RECTOCELE  3/2012     Z TOTAL KNEE ARTHROPLASTY  12/2005    left       Allergies   Allergen Reactions     Bactrim [Sulfamethoxazole W/Trimethoprim] Hives     Codeine Itching     NAUSEA     Morphine Itching     NAUSEA      Social History     Tobacco Use     Smoking status: Never Smoker     Smokeless tobacco: Never Used   Substance Use Topics     Alcohol use: Not Currently     Alcohol/week: 63.0 standard drinks     Types: 63 Standard drinks or equivalent per week      Wt Readings from Last 1 Encounters:   08/02/22 83.9 kg (185 lb)        Anesthesia Evaluation   Pt has had prior anesthetic.     No history of anesthetic complications       ROS/MED HX  ENT/Pulmonary:     (+) JENN risk factors, hypertension, obese,  (-) tobacco use, asthma, COPD and sleep apnea   Neurologic:  - neg neurologic ROS   (+) dementia,     Cardiovascular:     (+) Dyslipidemia hypertension--CAD ---valvular problems/murmurs type: AS Previous cardiac testing   Echo: Date: 7/22 Results:  Interpretation Summary  Global and regional left ventricular function is normal with an EF of 60-65%.  Global right ventricular function is normal.  Severe aortic stenosis is present. Peak velocity is 3.9 m/s, mean gradient 41  mmHg, valve area 1.0 cm/m2. SVi: 43.7 ml/m2  Moderate  mitral annular calcification is present.  The inferior vena cava is normal.  No pericardial effusion is present.    Stress Test: Date: Results:    ECG Reviewed: Date: Results:    Cath: Date: 6/22 Results:  Conclusion    1. Mild coronary atherosclerosis, no obstructive lesions.  2. Mildly elevated left-sided filling pressures                 RA mean 8 mmHg                PA 36 / 21 mean of 28 mmHg                PCWP mean 19 mmHg                PA O2 saturation 58%   (-) stent   METS/Exercise Tolerance:     Hematologic:     (+) anemia,     Musculoskeletal:   (+) arthritis,     GI/Hepatic: Comment: NAFLD    (+) GERD, liver disease,     Renal/Genitourinary:     (+) renal disease, type: CRI, Pt does not require dialysis,     Endo: Comment: S/P gastric BP    (+) Obesity,  (-) Type II DM   Psychiatric/Substance Use:     (+) psychiatric history anxiety and depression alcohol abuse     Infectious Disease:       Malignancy:    (-) malignancy   Other:            Physical Exam    Airway        Mallampati: III   TM distance: > 3 FB   Neck ROM: full   Mouth opening: > 3 cm    Respiratory Devices and Support         Dental  no notable dental history         Cardiovascular          Rhythm and rate: regular   (+) murmur       Pulmonary   pulmonary exam normal        breath sounds clear to auscultation           OUTSIDE LABS:  CBC:   Lab Results   Component Value Date    WBC 6.0 08/02/2022    WBC 5.7 06/20/2022    HGB 10.6 (L) 08/02/2022    HGB 9.3 (L) 06/20/2022    HCT 33.8 (L) 08/02/2022    HCT 29.4 (L) 06/20/2022     08/02/2022     06/20/2022     BMP:   Lab Results   Component Value Date     08/02/2022     06/23/2022    POTASSIUM 4.9 08/02/2022    POTASSIUM 4.4 06/23/2022    CHLORIDE 103 08/02/2022    CHLORIDE 107 06/23/2022    CO2 30 08/02/2022    CO2 28 06/23/2022    BUN 22 08/02/2022    BUN 22 06/23/2022    CR 1.07 (H) 08/02/2022    CR 1.12 (H) 06/23/2022     (H) 08/02/2022     (H)  06/23/2022     COAGS:   Lab Results   Component Value Date    PTT 28 06/20/2022    INR 0.95 08/02/2022    FIBR 166 (L) 06/12/2017     POC:   Lab Results   Component Value Date     (H) 04/11/2012     HEPATIC:   Lab Results   Component Value Date    ALBUMIN 3.0 (L) 03/10/2022    PROTTOTAL 6.8 03/10/2022    ALT 23 03/10/2022    AST 41 03/10/2022    GGT 24 12/04/2019    ALKPHOS 111 03/10/2022    BILITOTAL 0.7 03/10/2022    RAULITO 45 06/13/2017     OTHER:   Lab Results   Component Value Date    PH 7.32 06/20/2022    LACT <0.3 06/20/2022    A1C 5.7 09/29/2010    BIRGIT 9.3 08/02/2022    PHOS 2.1 (L) 03/10/2022    MAG 1.8 03/21/2022    LIPASE 202 03/10/2022    TSH 1.05 03/03/2021    SED 34 (H) 04/23/2012       Anesthesia Plan    ASA Status:  4   NPO Status:  NPO Appropriate    Anesthesia Type: MAC.     - Reason for MAC: straight local not clinically adequate   Induction: Intravenous.           Consents    Anesthesia Plan(s) and associated risks, benefits, and realistic alternatives discussed. Questions answered and patient/representative(s) expressed understanding.    - Discussed:     - Discussed with:  Patient      - Extended Intubation/Ventilatory Support Discussed: No.      - Patient is DNR/DNI Status: No    Use of blood products discussed: Yes.     - Discussed with: Patient.     - Consented: consented to blood products            Reason for refusal: other.     Postoperative Care    Pain management: Multi-modal analgesia.   PONV prophylaxis: Ondansetron (or other 5HT-3), Dexamethasone or Solumedrol     Comments:    Other Comments: Patient is counseled on the anesthesia plan and relevant anesthesia procedures including all risks and benefits. All patient questions were answered.             Umesh Loza MD

## 2022-08-09 NOTE — PROGRESS NOTES
Interventional cardiology    S/p TAVR    23mm Payne deployed successfully via right femoral access.   Bilateral groin sites were closed and RFV access was removed.  Patient had placement of an LMA to facilitate deep sedation due to delirium/behavior issues. Otherwise no complications.  Full report to follow.    Nora Parker MD on 8/9/2022 at 12:48 PM

## 2022-08-09 NOTE — PROGRESS NOTES
[facility] ICU RESPIRATORY NOTE        Date of Admission: 8/9/2022    Date of Intubation (most recent): 08/09/22    Reason for Mechanical Ventilation: TAVR/AW protection    Number of Days on Mechanical Ventilation: 1    Met Criteria for Spontaneous Breathing Trial: No    Reason for No Spontaneous Breathing Trial: acute onset    Significant Events Today: intubated/TAVR    ABG Results: No lab results found in last 7 days.    Current Vent Settings: Vent Mode: SIMV/PS  (Synchronized Intermittent Mandatory Ventilation with Pressure Support)  Resp Rate (Set): 12 breaths/min  Tidal Volume (Set, mL): 500 mL  PEEP (cm H2O): 5 cmH2O  Pressure Support (cm H2O): 15 cmH2O  Resp: 12      Skin Assessment: donr    Plan: wean and extubate    RT Cathryn on 8/9/2022 at 1:25 PM

## 2022-08-09 NOTE — OP NOTE
Procedure Date: 08/09/2022    OPERATING AREA:  CV lab 1.    PROCEDURE:  Right transfemoral transcatheter aortic valve replacement using a 23 mm Payne Kaleigh 3 Ultra valve at nominal +1.    ATTENDING SURGEON:  Marily Sprague MD    INTERVENTIONAL CARDIOLOGISTS:  Nora Parker MD; Rojas Christiansen MD    ANESTHESIA:  MAC with local and later an LMA was placed.    SKIN PREP:  Betadine and DuraPrep.    INCISIONS:  None.    DRAINS:  None.    SPECIMENS:  None.    CULTURES:  None.    CLOSURE:  MANTA device.    PREOPERATIVE DIAGNOSES:     1.  Severe aortic stenosis.  2.  Mild coronary artery disease.  3.  Mildly dilated ascending aorta.  4.  Paroxysmal supraventricular tachycardia.  5.  Hypertension.  6.  Dyslipidemia.  7.  Alcohol abuse.  8.  Status post gastric bypass.    POSTOPERATIVE DIAGNOSES:    1.  Severe aortic stenosis.  2.  Mild coronary artery disease.  3.  Mildly dilated ascending aorta.  4.  Paroxysmal supraventricular tachycardia.  5.  Hypertension.  6.  Dyslipidemia.  7.  Alcohol abuse.  8.  Status post gastric bypass.    BRIEF HISTORY:  Ms. Headley is a 76-year-old woman who was hospitalized in 03/2022 and was found to have severe aortic stenosis.  She recently completed a treatment program for alcohol abuse.  She was found to have mild coronary artery disease on angiography.  Her ascending aorta is enlarged and measures at 4.1.  Because of her severe dyspnea on exertion, she was felt to be a good candidate to undergo a transcatheter aortic valve replacement and the above procedure was planned.    FINDINGS AT OPERATION:  Once the valve was deployed, the gradient across the valve was 6 to 7 mmHg.  There was trace to mild perivalvular leak, if any.  No perivalvular leak was seen on aortogram.  Her trivial pericardial effusion was unchanged.  Once the MANTA device was deployed, DSA was performed, which showed some tapering of the right femoral artery, but there was no extravasation or  dissection.    DESCRIPTION OF PROCEDURE:  The patient arrived in the operating room and was positioned supine.  MAC anesthesia was induced.  The patient's neck, chest, abdomen and both groins were prepped and draped in a standard sterile fashion.  Local anesthetic was applied to both groin areas by Dr. Parker.  The left femoral artery and vein were cannulated.  Through the left femoral vein, a temporary pacing wire was placed in the right ventricle.  It was tested and captured well.  The right femoral artery was then cannulated and through it, an Payne eSheath was passed without difficulty.  The necessary intracardiac wires were placed.  Heparin was administered.  On the back table, a 23 mm Payne Kaleigh 3 Ultra valve was prepared at nominal +1.  When the ACT was appropriate, it was brought on to the operating room table and passed up the Payne eSheath.  It was prepared and then positioned.  The patient moved quite a bit at this point in time and so an LMA was placed.  The valve was quickly positioned and the valve deployed at nominal plus one.  Transthoracic echo was then performed with the above findings.  An aortogram and LVEDP were determined.  The valve deployment apparatus was removed, as were the intracardiac wires.  The Apyne eSheath was removed and the MANTA device deployed.  DSA was performed.  The estimated blood loss was 100 mL.  Mrs. Jasso remained intubated and was brought to the PACU in satisfactory condition.    Marily Sprague MD        D: 2022   T: 2022   MT: [id number]    Name:     DARWIN JASSO  MRN:      [id number]        Account:        [id number]   :      1945           Procedure Date: 2022     Document: [id number]

## 2022-08-09 NOTE — ANESTHESIA POSTPROCEDURE EVALUATION
Patient: Kavitha Headley    Procedure: Procedure(s):  Transcatheter Aortic Valve Replacement-Femoral Approach       Anesthesia Type:  MAC    Note:  Disposition: Inpatient   Postop Pain Control: Uneventful            Sign Out: Well controlled pain   PONV:    Neuro/Psych: Uneventful            Sign Out: Acceptable/Baseline neuro status   Airway/Respiratory: Uneventful            Sign Out: AIRWAY IN SITU/Resp. Support   CV/Hemodynamics: Uneventful            Sign Out: Acceptable CV status   Other NRE: NONE   DID A NON-ROUTINE EVENT OCCUR? No    Event details/Postop Comments:  Patient required period of ETT after deployment to post op only to keep her still.            Last vitals:  Vitals Value Taken Time   /53 08/09/22 1630   Temp     Pulse 73 08/09/22 1635   Resp 15 08/09/22 1635   SpO2 100 % 08/09/22 1633   Vitals shown include unvalidated device data.    Electronically Signed By: Umesh Loza MD  August 9, 2022  5:50 PM

## 2022-08-09 NOTE — Clinical Note
Patient returns call, informed.   Procedure is scheduled in [facility].

## 2022-08-09 NOTE — PROGRESS NOTES
Extubation Note    Successful completion of SBT (Yes or No):yes  Extubation time:1510    Patient assessment:  Lung sounds:Clear  Stridor Present (Yes or No): No  Patient tolerance: well    Oxygen device:  Liter flow:7  SpO2:100    Plan:maintain.

## 2022-08-09 NOTE — ANESTHESIA CARE TRANSFER NOTE
Patient: Kavitha Headley    Procedure: Procedure(s):  Transcatheter Aortic Valve Replacement-Femoral Approach       Diagnosis: valvular disease  Diagnosis Additional Information: No value filed.    Anesthesia Type:   MAC     Note:    Oropharynx: ventilatory support  Level of Consciousness: iatrogenic sedation      Independent Airway: airway patency not satisfactory and stable  Dentition: dentition unchanged  Vital Signs Stable: post-procedure vital signs reviewed and stable  Report to RN Given: handoff report given  Patient transferred to: PACU  Comments: Patient connected to SpO2, EKG, and arterial blood pressure transport monitors and accompanied by CRNA, circulating RN to ICU room. Patient ventilated by CRNA with ambu via ETT with O2 at 10 liters per minute during transport.     On arrival to ICU, endotracheal tube position unchanged, equal, bilateral breath sounds auscultated in ICU room, patient placed on ICU ventilator by respiratory therapist, teeth and oral mucosa intact and unchanged at handoff of care. At anesthesia handoff of care, report on patient's clinical status given to PACU RN, PACU staff questions answered.  Handoff Report: Identifed the Patient, Identified the Reponsible Provider, Reviewed the pertinent medical history, Discussed the surgical course, Reviewed Intra-OP anesthesia mangement and issues during anesthesia, Set expectations for post-procedure period and Allowed opportunity for questions and acknowledgement of understanding      Vitals:  Vitals Value Taken Time   /57 08/09/22 1310   Temp 98    Pulse 66 08/09/22 1314   Resp 11 08/09/22 1314   SpO2 99 % 08/09/22 1314   Vitals shown include unvalidated device data.    Electronically Signed By: ARISTEO Reece CRNA  August 9, 2022  1:15 PM

## 2022-08-09 NOTE — Clinical Note
Prepped: groin, radials, chest, abdomen and neck. Prepped with: ChloraPrep. The patient was draped. .

## 2022-08-10 ENCOUNTER — APPOINTMENT (OUTPATIENT)
Dept: PHYSICAL THERAPY | Facility: CLINIC | Age: 77
DRG: 266 | End: 2022-08-10
Attending: INTERNAL MEDICINE
Payer: COMMERCIAL

## 2022-08-10 ENCOUNTER — APPOINTMENT (OUTPATIENT)
Dept: GENERAL RADIOLOGY | Facility: CLINIC | Age: 77
DRG: 266 | End: 2022-08-10
Attending: INTERNAL MEDICINE
Payer: COMMERCIAL

## 2022-08-10 ENCOUNTER — APPOINTMENT (OUTPATIENT)
Dept: CARDIOLOGY | Facility: CLINIC | Age: 77
DRG: 266 | End: 2022-08-10
Attending: INTERNAL MEDICINE
Payer: COMMERCIAL

## 2022-08-10 LAB
ANION GAP SERPL CALCULATED.3IONS-SCNC: 4 MMOL/L (ref 3–14)
BUN SERPL-MCNC: 18 MG/DL (ref 7–30)
CALCIUM SERPL-MCNC: 9.4 MG/DL (ref 8.5–10.1)
CHLORIDE BLD-SCNC: 106 MMOL/L (ref 94–109)
CO2 SERPL-SCNC: 27 MMOL/L (ref 20–32)
CREAT SERPL-MCNC: 0.98 MG/DL (ref 0.52–1.04)
ERYTHROCYTE [DISTWIDTH] IN BLOOD BY AUTOMATED COUNT: 17.2 % (ref 10–15)
GFR SERPL CREATININE-BSD FRML MDRD: 59 ML/MIN/1.73M2
GLUCOSE BLD-MCNC: 112 MG/DL (ref 70–99)
HCT VFR BLD AUTO: 33.7 % (ref 35–47)
HGB BLD-MCNC: 10.5 G/DL (ref 11.7–15.7)
LVEF ECHO: NORMAL
MAGNESIUM SERPL-MCNC: 2 MG/DL (ref 1.6–2.3)
MCH RBC QN AUTO: 29.3 PG (ref 26.5–33)
MCHC RBC AUTO-ENTMCNC: 31.2 G/DL (ref 31.5–36.5)
MCV RBC AUTO: 94 FL (ref 78–100)
PHOSPHATE SERPL-MCNC: 3.4 MG/DL (ref 2.5–4.5)
PLATELET # BLD AUTO: 276 10E3/UL (ref 150–450)
POTASSIUM BLD-SCNC: 4.1 MMOL/L (ref 3.4–5.3)
RBC # BLD AUTO: 3.58 10E6/UL (ref 3.8–5.2)
SODIUM SERPL-SCNC: 137 MMOL/L (ref 133–144)
WBC # BLD AUTO: 8.1 10E3/UL (ref 4–11)

## 2022-08-10 PROCEDURE — 999N000208 ECHOCARDIOGRAM LIMITED

## 2022-08-10 PROCEDURE — 80048 BASIC METABOLIC PNL TOTAL CA: CPT | Performed by: INTERNAL MEDICINE

## 2022-08-10 PROCEDURE — 93010 ELECTROCARDIOGRAM REPORT: CPT | Performed by: INTERNAL MEDICINE

## 2022-08-10 PROCEDURE — 99232 SBSQ HOSP IP/OBS MODERATE 35: CPT | Performed by: STUDENT IN AN ORGANIZED HEALTH CARE EDUCATION/TRAINING PROGRAM

## 2022-08-10 PROCEDURE — 36415 COLL VENOUS BLD VENIPUNCTURE: CPT | Performed by: INTERNAL MEDICINE

## 2022-08-10 PROCEDURE — 93325 DOPPLER ECHO COLOR FLOW MAPG: CPT | Mod: 26 | Performed by: INTERNAL MEDICINE

## 2022-08-10 PROCEDURE — 250N000013 HC RX MED GY IP 250 OP 250 PS 637: Performed by: NURSE PRACTITIONER

## 2022-08-10 PROCEDURE — 250N000013 HC RX MED GY IP 250 OP 250 PS 637: Performed by: HOSPITALIST

## 2022-08-10 PROCEDURE — 97161 PT EVAL LOW COMPLEX 20 MIN: CPT | Mod: GP

## 2022-08-10 PROCEDURE — 93321 DOPPLER ECHO F-UP/LMTD STD: CPT | Mod: 26 | Performed by: INTERNAL MEDICINE

## 2022-08-10 PROCEDURE — 85014 HEMATOCRIT: CPT | Performed by: INTERNAL MEDICINE

## 2022-08-10 PROCEDURE — 99207 PR SC NO CHARGE VISIT: CPT | Performed by: INTERNAL MEDICINE

## 2022-08-10 PROCEDURE — 83735 ASSAY OF MAGNESIUM: CPT | Performed by: INTERNAL MEDICINE

## 2022-08-10 PROCEDURE — 255N000002 HC RX 255 OP 636: Performed by: INTERNAL MEDICINE

## 2022-08-10 PROCEDURE — 97110 THERAPEUTIC EXERCISES: CPT | Mod: GP

## 2022-08-10 PROCEDURE — 97530 THERAPEUTIC ACTIVITIES: CPT | Mod: GP

## 2022-08-10 PROCEDURE — 210N000002 HC R&B HEART CARE

## 2022-08-10 PROCEDURE — 99233 SBSQ HOSP IP/OBS HIGH 50: CPT | Performed by: INTERNAL MEDICINE

## 2022-08-10 PROCEDURE — 250N000011 HC RX IP 250 OP 636: Performed by: INTERNAL MEDICINE

## 2022-08-10 PROCEDURE — 93308 TTE F-UP OR LMTD: CPT | Mod: 26 | Performed by: INTERNAL MEDICINE

## 2022-08-10 PROCEDURE — 71045 X-RAY EXAM CHEST 1 VIEW: CPT

## 2022-08-10 PROCEDURE — 93321 DOPPLER ECHO F-UP/LMTD STD: CPT

## 2022-08-10 PROCEDURE — 93005 ELECTROCARDIOGRAM TRACING: CPT

## 2022-08-10 PROCEDURE — 84100 ASSAY OF PHOSPHORUS: CPT | Performed by: INTERNAL MEDICINE

## 2022-08-10 RX ORDER — TRAZODONE HYDROCHLORIDE 100 MG/1
100 TABLET ORAL AT BEDTIME
Status: DISCONTINUED | OUTPATIENT
Start: 2022-08-10 | End: 2022-08-10

## 2022-08-10 RX ADMIN — GABAPENTIN 600 MG: 600 TABLET, FILM COATED ORAL at 23:12

## 2022-08-10 RX ADMIN — QUETIAPINE FUMARATE 50 MG: 50 TABLET ORAL at 00:23

## 2022-08-10 RX ADMIN — HYDRALAZINE HYDROCHLORIDE 10 MG: 20 INJECTION, SOLUTION INTRAMUSCULAR; INTRAVENOUS at 18:43

## 2022-08-10 RX ADMIN — QUETIAPINE FUMARATE 300 MG: 300 TABLET, EXTENDED RELEASE ORAL at 00:24

## 2022-08-10 RX ADMIN — HYDRALAZINE HYDROCHLORIDE 10 MG: 20 INJECTION, SOLUTION INTRAMUSCULAR; INTRAVENOUS at 19:03

## 2022-08-10 RX ADMIN — PRAVASTATIN SODIUM 20 MG: 20 TABLET ORAL at 19:57

## 2022-08-10 RX ADMIN — MELATONIN 5 MG TABLET 10 MG: at 23:12

## 2022-08-10 RX ADMIN — ASPIRIN 81 MG: 81 TABLET, COATED ORAL at 09:40

## 2022-08-10 RX ADMIN — HUMAN ALBUMIN MICROSPHERES AND PERFLUTREN 9 ML: 10; .22 INJECTION, SOLUTION INTRAVENOUS at 10:56

## 2022-08-10 RX ADMIN — QUETIAPINE FUMARATE 50 MG: 50 TABLET ORAL at 23:12

## 2022-08-10 RX ADMIN — QUETIAPINE FUMARATE 300 MG: 300 TABLET, EXTENDED RELEASE ORAL at 23:12

## 2022-08-10 RX ADMIN — MELATONIN 5 MG TABLET 10 MG: at 01:07

## 2022-08-10 RX ADMIN — FOLIC ACID 1 MG: 1 TABLET ORAL at 09:40

## 2022-08-10 RX ADMIN — GABAPENTIN 600 MG: 600 TABLET, FILM COATED ORAL at 00:23

## 2022-08-10 ASSESSMENT — ACTIVITIES OF DAILY LIVING (ADL)
ADLS_ACUITY_SCORE: 22

## 2022-08-10 NOTE — PROGRESS NOTES
[facility]  Cardiology Progress Note    Date of Service (when I saw the patient): 08/10/2022  Primary Cardiologist: Dr. Ugarte       Interval History:   SOB with minimal ambulation (more than her baseline). No other symptoms.     ----------------------------------------------------------------------------------------    Assessment:  Kavitha Headley is a 76 year old female who was admitted on 8/9/2022 for elective TAVR.     # S/p TAVR   -- received 23 mm Payne S3 valve with no complications (LMA anesthesia had to be used due to delirium/behavior issues per proceduralist)  -- BMP unremarkable  -- CBC shows mild anemia but this is stable   -- ECG shows NSR with chronic 1st degree AVB though slightly more prolonged now     # Mild nonobstructive coronary disease    # Hx of alcohol use disorder s/p multiple rehab admissions and interventions  -- has been sober for 7 months with no withdrawal symptoms    # Acute on chronic HFpEF (NYHA class II; stage B)  -- LVEDP 10 mmHg after deployment   -- Slight exertional SOB, more pronounced than usual   -- CXR yesterday (8/9) shows right basilar atelectasis vs. pna (no fever, or leukocytosis)    # Mildly dilated ascending aorta measuring 4.1 cm    # paroxysmal SVT and PACs    # hypertension  -- pta metoprolol on hold     # hyperlipidemia  # CKD  # obesity s/p gastric bypass surgery.     ----------------------------------------------------------------------------------------    Plan:  -- Encourage use of incentive spirometry; given oxygen on room air and normal LVEDP we will defer IV diuresis at this time but can consider later today if SOB does not improve with rehab and IS  -- Continue with aspirin 81 mg daily   -- Start amlodipine 2.5 mg daily for BP control   -- Continue to hold metoprolol succinate 25 mg daily for now given longer 1st degree AVB, can resume it as outpatient  -- Full resting echocardiogram today   -- Cardiac rehab today   --  Discussed SBE prophylaxis   -- Likely discharge to home tomorrow (8/11)  -- Outpatient cardiology follow up arranged  -- will need outpatient cardiac rehab as well       ----------------------------------------------------------------------------------------  Physical Exam   Temp: 98.9  F (37.2  C) Temp src: Oral BP: 130/58 Pulse: 96   Resp: 15 SpO2: 92 % O2 Device: Simple face mask Oxygen Delivery: 2 LPM (for comfort)  Vitals:    08/10/22 0606   Weight: 83.2 kg (183 lb 6.4 oz)     GEN: NAD.  Oxygen on room air.   HEENT: Mucous membranes moist.  NECK: Unable to assess JVP.    C/V:  Regular rate and rhythm; 2/6 DAVID at RUSB.  RESP: CTA, marcela.   GI: Abdomen soft, nontender, nondistended.    EXTREM: No pitting LE edema.   NEURO: Alert and oriented, cooperative.   PSYCH: Normal affect.  SKIN: Warm and dry.   VASC: 2+ radial and dorsalis pedis pulses bilaterally.      Medications       aspirin  81 mg Oral Daily     folic acid  1 mg Oral Daily     gabapentin  600 mg Oral At Bedtime     melatonin  10 mg Oral At Bedtime     pravastatin  20 mg Oral QPM     QUEtiapine  50 mg Oral At Bedtime     QUEtiapine  300 mg Oral At Bedtime       Data   Reviewed.     Michael Kinsey PA-C   8/10/2022  Pager: [phone]

## 2022-08-10 NOTE — PROGRESS NOTES
08/10/22 0903   Quick Adds   Type of Visit Initial PT Evaluation   Living Environment   People in Home alone;significant other   Current Living Arrangements house   Home Accessibility stairs to enter home   Number of Stairs, Main Entrance 1   Stair Railings, Main Entrance none   Transportation Anticipated family or friend will provide   Living Environment Comments Pt lives in single level town house. s/o stays part time on the weekend fri-mon and is able to help out as needed.   Self-Care   Usual Activity Tolerance moderate   Current Activity Tolerance poor   Equipment Currently Used at Home none   Fall history within last six months yes   Number of times patient has fallen within last six months 1   Activity/Exercise/Self-Care Comment ind with ADLs and IADLs at baseline. S/o drives pt and helps with groceries. Pt has a walker at home from previous surgeries.   General Information   Onset of Illness/Injury or Date of Surgery 08/09/22   Referring Physician Nora Parker MD   Patient/Family Therapy Goals Statement (PT) Planning on going home.   Pertinent History of Current Problem (include personal factors and/or comorbidities that impact the POC) Pt is a 76 year old female admitted on 8/9/2022. She is admitted for a TAVR. She has a past medical history significant for severe aortic stenosis, mild nonobstructive coronary disease, hx of alcohol use disorder s/p multiple rehab admissions and interventions (has been sober for 7 months with no withdrawal symptoms), HFpEF, mildly dilated ascending aorta measuring 4.1 cm, paroxysmal SVT and PACs, hypertension, hyperlipidemia, CKD, and obesity s/p gastric bypass surgery. Pt is POD#1 s/p TAVR.   Existing Precautions/Restrictions cardiac;other (see comments)  (no lifting >10#)   Cognition   Affect/Mental Status (Cognition) WFL   Orientation Status (Cognition) oriented x 4   Follows Commands (Cognition) WFL   Pain Assessment   Patient Currently in Pain No    Integumentary/Edema   Integumentary/Edema Comments Incision sites not visualized. Per chart, pt has bilateral groin incisions.   Posture    Posture Forward head position;Protracted shoulders   Range of Motion (ROM)   ROM Comment Grossly WFL BUE and LE   Strength (Manual Muscle Testing)   Strength Comments Grossly antigravity strength with functional transfers.   Bed Mobility   Comment, (Bed Mobility) Supine HOB flat>sitting EOB, SBA.   Transfers   Comment, (Transfers) sit>stand to FWW, Axel.   Gait/Stairs (Locomotion)   Comment, (Gait/Stairs) Amb with FWW, CGA, decreased katiana and step length.   Balance   Balance Comments Able to maintain seated balance unsupported. Able to maintain standing balance in FWW, Axel.   Sensory Examination   Sensory Perception patient reports no sensory changes   Clinical Impression   Criteria for Skilled Therapeutic Intervention Yes, treatment indicated   PT Diagnosis (PT) Impaired mobility   Influenced by the following impairments impaired gait, decreased activity tolerance   Functional limitations due to impairments fall risk, impaired functional independence, impaired ADLs and IADLs   Clinical Presentation (PT Evaluation Complexity) Stable/Uncomplicated   Clinical Presentation Rationale per MR and clinical judgement   Clinical Decision Making (Complexity) low complexity   Planned Therapy Interventions (PT) bed mobility training;home exercise program;patient/family education;stair training;transfer training;progressive activity/exercise;risk factor education;home program guidelines   Risk & Benefits of therapy have been explained evaluation/treatment results reviewed;care plan/treatment goals reviewed;risks/benefits reviewed;current/potential barriers reviewed;participants voiced agreement with care plan;participants included;patient   PT Discharge Planning   PT Discharge Recommendation (DC Rec) home with outpatient cardiac rehab;home with assist   PT Rationale for DC Rec Pt  limited this session by fatigue and activity tolerance. Anticipate when medically ready, pt may discharge to home with assist from s/o as needed, use of walker for ambulation, follow up with OP CR.   Total Evaluation Time   Total Evaluation Time (Minutes) 6   Physical Therapy Goals   PT Frequency 2x/day   PT Predicted Duration/Target Date for Goal Attainment 08/15/22   PT Goals Cardiac Phase 1   PT: Understanding of cardiac education to maximize quality of life, condition management, and health outcomes Patient;Verbalize;Demonstrate   PT: Perform aerobic activity with stable cardiovascular response continuous;5 minutes;ambulation;treadmill   PT: Functional/aerobic ambulation tolerance with stable cardiovascular response in order to return to home and community environment Modified independent;Assistive device;Rolling walker;200 feet   PT: Navigation of stairs simulating home set up with stable cardiovascular response in order to return to home and community environment Modified independent;Assistive device;1 stair

## 2022-08-10 NOTE — PROGRESS NOTES
Pt is A&Ox4, 1xassist to commode.  VSS, no c/o pain during shift. BL groin sites CDI.  Pedal pulses intact. Neuros intact.     SBP to stay below 140, PRN IV hydralazine available. Hydralazine was provided at 2200. Pt asked for trazodone to help sleep, MD notified however it was not ordered d/t QT interval. Melatonin ordered instead.      Pt is on 2L NC for comfort, takes NC off and puts back on throughout shift.     Will continue to monitor and follow plan of care.

## 2022-08-10 NOTE — PROGRESS NOTES
[facility]    Medicine Progress Note - Hospitalist Service    Date of Admission:  8/9/2022    Assessment & Plan          Kavitha Headley is a 76 year old female admitted on 8/9/2022. She is admitted for a TAVR. She has a past medical history significant for severe aortic stenosis, mild nonobstructive coronary disease, hx of alcohol use disorder s/p multiple rehab admissions and interventions (has been sober for 7 months with no withdrawal symptoms), HFpEF, mildly dilated ascending aorta measuring 4.1 cm, paroxysmal SVT and PACs, hypertension, hyperlipidemia, CKD, and obesity s/p gastric bypass surgery.      S/P TAVR on 8/9 with a 23mm Payne valve   History of severe AORTIC STENOSIS   Mild CAD  HFpEF  Dilated ascending aorta measuring 4.1 cm  SVT and PACs  HTN  HLP  -Per cardiology  -EBL 1000mL, suspect this is inaccurate as hgb 8/2 was 10.6 and hgb 10.5 on 8/10  -Possible discharge on 8/11 pending cardiology clearance     History of ETOH abuse   -Sober x 7 months  -Monitor for now     CKD stage III  -Baseline creat 1.2-1.4  -Stable     S/P gasrtic bypass  Obesity  -Noted      Insomnia   Anxiety  -Resume home Sero[place]   -Last QTc 532 ms       Diet: Combination Diet Low Saturated Fat Na <2400mg Diet    DVT Prophylaxis: Pneumatic Compression Devices  Montelongo Catheter: Not present  Central Lines: None  Cardiac Monitoring: ACTIVE order. Indication: Transcatheter structural interventions (24 hours)  Code Status: Full Code      Disposition Plan      Expected Discharge Date: 08/11/2022      Destination: home          The patient's care was discussed with the Bedside Nurse and Patient.    Donal Angulo MD  Hospitalist Service  [facility]  Securely message with the Vocera Web Console (learn more here)  Text page via [employer] Paging/Directory         Clinically Significant Risk Factors Present on Admission               # Obesity: Estimated body mass index is 31.48 kg/m  as  "calculated from the following:    Height as of 8/2/22: 1.626 m (5' 4\").    Weight as of this encounter: 83.2 kg (183 lb 6.4 oz).        ______________________________________________________________________    Interval History   Feels much better today than pre-op. Still with some chest heaviness/sob with activity. Cardiology would like to keep until 8/11.     Data reviewed today: I reviewed all medications, new labs and imaging results over the last 24 hours. I personally reviewed no images or EKG's today.    Physical Exam   Vital Signs: Temp: 98.9  F (37.2  C) Temp src: Oral BP: 135/75 Pulse: 80   Resp: 22 SpO2: 92 % O2 Device: Simple face mask Oxygen Delivery: 2 LPM (for comfort)  Weight: 183 lbs 6.4 oz  Constitutional: Awake, alert, cooperative, no apparent distress  Respiratory: Clear to auscultation bilaterally, no crackles or wheezing  Cardiovascular: Regular rate and rhythm, normal S1 and S2, and no murmur noted  GI: Normal bowel sounds, soft, non-distended, non-tender  Skin/Integumen: No rashes, no cyanosis, no edema  Other:       Data   Recent Labs   Lab 08/10/22  0610   WBC 8.1   HGB 10.5*   MCV 94         POTASSIUM 4.1   CHLORIDE 106   CO2 27   BUN 18   CR 0.98   ANIONGAP 4   BIRGIT 9.4   *     Recent Results (from the past 24 hour(s))   XR Chest Port 1 View    Narrative    EXAM: XR CHEST PORT 1 VIEW  LOCATION: [facility]  DATE/TIME: 8/10/2022 5:27 AM    INDICATION: S P Transcatheter Aortic Valve Replacement  COMPARISON: 3/11/2022.    FINDINGS: Aortic valve prosthesis. The heart is enlarged. There is no pulmonary edema. Right hemidiaphragm is elevated and there is right basilar infiltrate. No pneumothorax.      Impression    IMPRESSION: Right basilar atelectasis or pneumonia.   Echo Limited   Result Value    LVEF  75%    Narrative    [id number]  [id number]  [id number]  [id number]^TIERA^CRISTI     [facility]  Echocardiography Laboratory  [address] [place] [address]     Name: DARWIN JASSO  MRN: [id number]  : 1945  Study Date: 08/10/2022 10:35 AM  Age: 76 yrs  Gender: Female  Patient Location: [facility]  Reason For Study: Aortic Valve Replacement  Ordering Physician: CRISTI MOTT  Referring Physician: Nayeli Castorena  Performed By: Steven Abbott NII     BSA: 1.9 m2  Height: 64 in  Weight: 183 lb  HR: 78  BP: 130/58 mmHg  ______________________________________________________________________________  Procedure  Limited Portable Echo Adult. Optison (NDC #[id number]) given intravenously.  ______________________________________________________________________________  Interpretation Summary     There is a 23 mm Payne Kaleigh 3 Ultra TAVR valve in the aortic position.  The gradient is normal for this prosthetic aortic valve.  No aortic regurgitation is present.  The visual ejection fraction is estimated at 75%.  Hyperdynamic left ventricular function  The ascending aorta is Mildly dilated.  ______________________________________________________________________________  Left Ventricle  Hyperdynamic left ventricular function. The visual ejection fraction is  estimated at 75%.     Right Ventricle  The right ventricle is normal in size and function.     Atria  The left atrium is mild to moderately dilated. Right atrial size is normal.     Mitral Valve  There is mild to moderate mitral annular calcification. The mitral valve  leaflets are mildly thickened. There is mild to moderate (1-2+) mitral  regurgitation.     Tricuspid Valve  There is trace tricuspid regurgitation. The right ventricular systolic  pressure is approximated at 22.6 mmHg plus the right atrial pressure.     Aortic Valve  No aortic regurgitation is present. The peak AoV pressure gradient is 27.7  mmHg. The mean AoV pressure gradient is 14.5 mmHg. There is a 23 mm Payne  Kaleigh 3 Ultra TAVR valve in the aortic position. The gradient is normal for  this prosthetic aortic  valve.     Pulmonic Valve  There is no pulmonic valvular regurgitation. Normal pulmonic valve velocity.     Vessels  The ascending aorta is Mildly dilated. (4.3 cm).     Pericardium  There is no pericardial effusion.     ______________________________________________________________________________  MMode/2D Measurements & Calculations  asc Aorta Diam: 4.3 cm  LVOT diam: 2.0 cm  LVOT area: 3.0 cm2     Doppler Measurements & Calculations  MV max P.6 mmHg  MV mean P.0 mmHg  MV V2 VTI: 56.3 cm  MVA(VTI): 1.5 cm2  Ao V2 max: 262.9 cm/sec  Ao max P.7 mmHg  Ao V2 mean: 181.1 cm/sec  Ao mean P.5 mmHg  Ao V2 VTI: 52.3 cm  RENE(I,D): 1.6 cm2  RENE(V,D): 1.5 cm2  Ao acc time: 0.10 sec  LV V1 max P.7 mmHg  LV V1 max: 129.8 cm/sec  LV V1 VTI: 27.4 cm  SV(LVOT): 82.9 ml  SI(LVOT): 44.0 ml/m2  TR max enrique: 237.6 cm/sec  TR max P.6 mmHg  AV Enrique Ratio (DI): 0.49  RENE Index (cm2/m2): 0.84     ______________________________________________________________________________  Report approved by: Felipe Chen 08/10/2022 11:53 AM           Medications       aspirin  81 mg Oral Daily     folic acid  1 mg Oral Daily     gabapentin  600 mg Oral At Bedtime     melatonin  10 mg Oral At Bedtime     pravastatin  20 mg Oral QPM     QUEtiapine  50 mg Oral At Bedtime     QUEtiapine  300 mg Oral At Bedtime

## 2022-08-10 NOTE — PLAN OF CARE
A&Ox4, neuros intact. BPs elevated this AM, improved most of this shift; elevated again this evening (140s-150's systolic), PRN hydralazine given. Ambulated in hallways with staff multiple times this shift. Likely discharge tomorrow.

## 2022-08-11 VITALS
TEMPERATURE: 98.5 F | BODY MASS INDEX: 31.41 KG/M2 | DIASTOLIC BLOOD PRESSURE: 63 MMHG | HEART RATE: 80 BPM | OXYGEN SATURATION: 98 % | RESPIRATION RATE: 18 BRPM | SYSTOLIC BLOOD PRESSURE: 115 MMHG | WEIGHT: 183 LBS

## 2022-08-11 LAB
ANION GAP SERPL CALCULATED.3IONS-SCNC: 5 MMOL/L (ref 3–14)
ATRIAL RATE - MUSE: 93 BPM
ATRIAL RATE - MUSE: 98 BPM
BUN SERPL-MCNC: 13 MG/DL (ref 7–30)
CALCIUM SERPL-MCNC: 9.3 MG/DL (ref 8.5–10.1)
CHLORIDE BLD-SCNC: 104 MMOL/L (ref 94–109)
CO2 SERPL-SCNC: 28 MMOL/L (ref 20–32)
CREAT SERPL-MCNC: 0.93 MG/DL (ref 0.52–1.04)
DIASTOLIC BLOOD PRESSURE - MUSE: NORMAL MMHG
DIASTOLIC BLOOD PRESSURE - MUSE: NORMAL MMHG
ERYTHROCYTE [DISTWIDTH] IN BLOOD BY AUTOMATED COUNT: 17.2 % (ref 10–15)
GFR SERPL CREATININE-BSD FRML MDRD: 63 ML/MIN/1.73M2
GLUCOSE BLD-MCNC: 117 MG/DL (ref 70–99)
HCT VFR BLD AUTO: 32.6 % (ref 35–47)
HGB BLD-MCNC: 10.3 G/DL (ref 11.7–15.7)
INTERPRETATION ECG - MUSE: NORMAL
INTERPRETATION ECG - MUSE: NORMAL
MAGNESIUM SERPL-MCNC: 1.9 MG/DL (ref 1.6–2.3)
MCH RBC QN AUTO: 29.2 PG (ref 26.5–33)
MCHC RBC AUTO-ENTMCNC: 31.6 G/DL (ref 31.5–36.5)
MCV RBC AUTO: 92 FL (ref 78–100)
P AXIS - MUSE: 50 DEGREES
P AXIS - MUSE: 67 DEGREES
PHOSPHATE SERPL-MCNC: 3.1 MG/DL (ref 2.5–4.5)
PLATELET # BLD AUTO: 226 10E3/UL (ref 150–450)
POTASSIUM BLD-SCNC: 4.1 MMOL/L (ref 3.4–5.3)
PR INTERVAL - MUSE: 218 MS
PR INTERVAL - MUSE: 230 MS
QRS DURATION - MUSE: 116 MS
QRS DURATION - MUSE: 88 MS
QT - MUSE: 380 MS
QT - MUSE: 480 MS
QTC - MUSE: 452 MS
QTC - MUSE: 532 MS
R AXIS - MUSE: -58 DEGREES
R AXIS - MUSE: 101 DEGREES
RBC # BLD AUTO: 3.53 10E6/UL (ref 3.8–5.2)
SODIUM SERPL-SCNC: 137 MMOL/L (ref 133–144)
SYSTOLIC BLOOD PRESSURE - MUSE: NORMAL MMHG
SYSTOLIC BLOOD PRESSURE - MUSE: NORMAL MMHG
T AXIS - MUSE: 2 DEGREES
T AXIS - MUSE: 78 DEGREES
VENTRICULAR RATE- MUSE: 74 BPM
VENTRICULAR RATE- MUSE: 85 BPM
WBC # BLD AUTO: 7.7 10E3/UL (ref 4–11)

## 2022-08-11 PROCEDURE — 80048 BASIC METABOLIC PNL TOTAL CA: CPT | Performed by: INTERNAL MEDICINE

## 2022-08-11 PROCEDURE — 93010 ELECTROCARDIOGRAM REPORT: CPT | Performed by: INTERNAL MEDICINE

## 2022-08-11 PROCEDURE — 99239 HOSP IP/OBS DSCHRG MGMT >30: CPT | Performed by: INTERNAL MEDICINE

## 2022-08-11 PROCEDURE — 250N000013 HC RX MED GY IP 250 OP 250 PS 637: Performed by: NURSE PRACTITIONER

## 2022-08-11 PROCEDURE — 84100 ASSAY OF PHOSPHORUS: CPT | Performed by: INTERNAL MEDICINE

## 2022-08-11 PROCEDURE — 36415 COLL VENOUS BLD VENIPUNCTURE: CPT | Performed by: INTERNAL MEDICINE

## 2022-08-11 PROCEDURE — 93005 ELECTROCARDIOGRAM TRACING: CPT

## 2022-08-11 PROCEDURE — 85014 HEMATOCRIT: CPT | Performed by: INTERNAL MEDICINE

## 2022-08-11 PROCEDURE — 83735 ASSAY OF MAGNESIUM: CPT | Performed by: INTERNAL MEDICINE

## 2022-08-11 RX ADMIN — FOLIC ACID 1 MG: 1 TABLET ORAL at 09:05

## 2022-08-11 RX ADMIN — ASPIRIN 81 MG: 81 TABLET, COATED ORAL at 09:05

## 2022-08-11 ASSESSMENT — ACTIVITIES OF DAILY LIVING (ADL)
ADLS_ACUITY_SCORE: 22

## 2022-08-11 NOTE — PLAN OF CARE
Cardiac Rehab Discharge Summary    Reason for therapy discharge:    Discharged to home with outpatient therapy.    Progress towards therapy goal(s). See goals on [facility] Plan in [facility] electronic health record for goal details.  Goals partially met.  Barriers to achieving goals:   discharge from facility.    Therapy recommendation(s):    Continued therapy is recommended.  Rationale/Recommendations: Pt showed improvement in activity tolerance this session.  Anticipate when medically ready, pt may discharge to home with assist from s/o as needed, use of walker for ambulation, follow up with OP CR.

## 2022-08-11 NOTE — DISCHARGE SUMMARY
[facility]  Hospitalist Discharge Summary      Date of Admission:  8/9/2022  Date of Discharge:  8/11/2022  Discharging Provider: Tiara Yates MD    Discharge Diagnoses   1. Severe aortic stenosis s/p TAVR on 8/9/22 (23 mm Payne valve)  2. First degree AV block  3. Chronic diastolic CHF  4. Dilated ascending aorta (4.1 cm)  5. Paroxysmal SVT  6. Essential hypertension  7. Hyperlipidemia  8. CKD stage IIIa  9. Anemia  10. Morbid obesity s/p gastric bypass  11. History of alcohol use disorder, in remission  12. Generalized anxiety disorder  13. History of insomnia    Follow-ups Needed After Discharge   Follow-up Appointments     Follow-up and recommended labs and tests       Follow up with Cardiology as scheduled           Discharge Disposition   Discharged to home  Condition at discharge: Stable    Hospital Course   Kavitha Headley is a 76 year old female with hx of severe aortic stenosis, CHF, paroxysmal SVT, and obesity s/p gastric bypass who was admitted on 8/9/2022 for planned TAVR. She underwent the procedure with no immediate complications. Post-TAVR TTE showed hyperdynamic LV (EF 75%) with well-seated TAVR valve, normal gradient.     - Per Cardiology recs, home metoprolol is on hold at the time of discharge due to first-degree AV block  - She has a follow up appt in TAVR clinic on 8/18  - She will continue outpatient cardiac rehab    Consultations This Hospital Stay   CARDIAC REHAB IP CONSULT  HOSPITALIST IP CONSULT    Code Status   Full Code    Time Spent on this Encounter   I, Tiara Yates MD, personally saw the patient today and spent less than or equal to 30 minutes discharging this patient.       Tiara Yates MD  [facility]  [address] [place] [address] [place], [address]  [facility] [address]  Phone: [phone]  ______________________________________________________________________    Physical Exam   Vital Signs: Temp: 98.5  F (36.9  C)  Temp src: Oral BP: 115/63 Pulse: 80   Resp: 18 SpO2: 98 % O2 Device: None (Room air) Oxygen Delivery: 2 LPM  Weight: 183 lbs 0 oz    Constitutional: Appears comfortable  HEENT: Sclera white, conjunctiva clear, EOMI, MMM  Respiratory: Breathing non-labored. Lungs CTAB - no wheezes/crackles/rhonchi  Cardiovascular: Heart RRR, 2-3/6 systolic murmur. No pedal edema.   GI: +BS. Abd soft/NT  Lymph/Hematologic: No cervical LAD  Genitourinary: Not examined  Skin: Warm and dry. No rash.  Musculoskeletal: Normal muscle bulk and tone  Neurologic: Alert and appropriate. FORD  Psychiatric: Calm and cooperative    Primary Care Physician   Nayeli Castorena    Discharge Orders      CARDIAC REHAB REFERRAL      Reason for your hospital stay    TAVR     Follow-up and recommended labs and tests     Follow up with Cardiology as scheduled     Activity    Your activity upon discharge: activity as tolerated     Diet    Follow this diet upon discharge: Low Saturated Fat, low sodium  <2400mg Diet       Significant Results and Procedures   Most Recent 3 CBC's:Recent Labs   Lab Test 08/11/22  0552 08/10/22  0610 08/02/22  1629   WBC 7.7 8.1 6.0   HGB 10.3* 10.5* 10.6*   MCV 92 94 94    276 340     Most Recent 3 BMP's:Recent Labs   Lab Test 08/11/22  0552 08/10/22  0610 08/02/22  1640    137 137   POTASSIUM 4.1 4.1 4.9   CHLORIDE 104 106 103   CO2 28 27 30   BUN 13 18 22   CR 0.93 0.98 1.07*   ANIONGAP 5 4 4   BIRGIT 9.3 9.4 9.3   * 112* 109*   ,   Results for orders placed or performed during the hospital encounter of 08/09/22   XR Chest Port 1 View    Narrative    EXAM: XR CHEST PORT 1 VIEW  LOCATION: [facility]  DATE/TIME: 8/10/2022 5:27 AM    INDICATION: S P Transcatheter Aortic Valve Replacement  COMPARISON: 3/11/2022.    FINDINGS: Aortic valve prosthesis. The heart is enlarged. There is no pulmonary edema. Right hemidiaphragm is elevated and there is right basilar infiltrate. No pneumothorax.       Impression    IMPRESSION: Right basilar atelectasis or pneumonia.   Echo Limited     Value    LVEF  75%    Narrative    [id number]  [id number]  [id number]  [id number]^TIERA^CRISTI     [facility]  Echocardiography Laboratory  [address] [place] [address]     Name: DARWIN JASSO  MRN: [id number]  : 1945  Study Date: 08/10/2022 10:35 AM  Age: 76 yrs  Gender: Female  Patient Location: [facility]  Reason For Study: Aortic Valve Replacement  Ordering Physician: CRISTI MOTT  Referring Physician: Nayeli Castorena  Performed By: Steven Abbott RDCS     BSA: 1.9 m2  Height: 64 in  Weight: 183 lb  HR: 78  BP: 130/58 mmHg  ______________________________________________________________________________  Procedure  Limited Portable Echo Adult. Optison (NDC #[id number]) given intravenously.  ______________________________________________________________________________  Interpretation Summary     There is a 23 mm Payne Kaleigh 3 Ultra TAVR valve in the aortic position.  The gradient is normal for this prosthetic aortic valve.  No aortic regurgitation is present.  The visual ejection fraction is estimated at 75%.  Hyperdynamic left ventricular function  The ascending aorta is Mildly dilated.  ______________________________________________________________________________  Left Ventricle  Hyperdynamic left ventricular function. The visual ejection fraction is  estimated at 75%.     Right Ventricle  The right ventricle is normal in size and function.     Atria  The left atrium is mild to moderately dilated. Right atrial size is normal.     Mitral Valve  There is mild to moderate mitral annular calcification. The mitral valve  leaflets are mildly thickened. There is mild to moderate (1-2+) mitral  regurgitation.     Tricuspid Valve  There is trace tricuspid regurgitation. The right ventricular systolic  pressure is approximated at 22.6 mmHg plus the right atrial pressure.     Aortic Valve  No aortic  regurgitation is present. The peak AoV pressure gradient is 27.7  mmHg. The mean AoV pressure gradient is 14.5 mmHg. There is a 23 mm Payen  Kaleigh 3 Ultra TAVR valve in the aortic position. The gradient is normal for  this prosthetic aortic valve.     Pulmonic Valve  There is no pulmonic valvular regurgitation. Normal pulmonic valve velocity.     Vessels  The ascending aorta is Mildly dilated. (4.3 cm).     Pericardium  There is no pericardial effusion.     ______________________________________________________________________________  MMode/2D Measurements & Calculations  asc Aorta Diam: 4.3 cm  LVOT diam: 2.0 cm  LVOT area: 3.0 cm2     Doppler Measurements & Calculations  MV max P.6 mmHg  MV mean P.0 mmHg  MV V2 VTI: 56.3 cm  MVA(VTI): 1.5 cm2  Ao V2 max: 262.9 cm/sec  Ao max P.7 mmHg  Ao V2 mean: 181.1 cm/sec  Ao mean P.5 mmHg  Ao V2 VTI: 52.3 cm  RENE(I,D): 1.6 cm2  RENE(V,D): 1.5 cm2  Ao acc time: 0.10 sec  LV V1 max P.7 mmHg  LV V1 max: 129.8 cm/sec  LV V1 VTI: 27.4 cm  SV(LVOT): 82.9 ml  SI(LVOT): 44.0 ml/m2  TR max enrique: 237.6 cm/sec  TR max P.6 mmHg  AV Enrique Ratio (DI): 0.49  RENE Index (cm2/m2): 0.84     ______________________________________________________________________________  Report approved by: Felipe Chen 08/10/2022 11:53 AM         Cardiac Catheterization    Addendum: 2022    Successful transfemoral transcatheter aortic valve replacement with a 23 mmEdwards Kaleigh 3 Ultra valve.      Narrative    Successful transfemoral transcatheter aortic valve replacement with a   26mmEdwards Kaleigh 3 Ultra valve.     *Note: Due to a large number of results and/or encounters for the requested time period, some results have not been displayed. A complete set of results can be found in Results Review.       Discharge Medications   Discharge Medication List as of 2022  9:47 AM      CONTINUE these medications which have NOT CHANGED    Details   acetaminophen (TYLENOL)  500 MG tablet Take 1,000 mg by mouth 2 times daily as needed for pain, Historical      aspirin 81 MG EC tablet Take 81 mg by mouth daily, Historical      bisacodyl (DULCOLAX) 5 MG EC tablet Take 5 mg by mouth daily as needed for constipation, Historical      folic acid (FOLVITE) 1 MG tablet Take 1 mg by mouth daily, Historical      gabapentin (NEURONTIN) 600 MG tablet Take 1 tablet (600 mg) by mouth At Bedtime, Disp-90 tablet, R-1, E-Prescribe      !! MISC NATURAL PRODUCTS PO Take 1 capsule by mouth daily *B-glucan capsule*, Historical      !! MISC NATURAL PRODUCTS PO Take 1 tablet by mouth daily *Keto - Ketogenesis*, Historical      !! MISC NATURAL PRODUCTS PO Take 1 tablet by mouth daily *L-Tryptophan 1000 mg*, Historical      Multiple Vitamins-Minerals (CENTRUM SILVER) per tablet Take 1 tablet by mouth daily, Historical      polyethylene glycol (MIRALAX) 17 GM/Dose powder Take 17 g by mouth 2 times daily as needed for constipation, Historical      pravastatin (PRAVACHOL) 20 MG tablet Take 1 tablet (20 mg) by mouth daily, Disp-90 tablet, R-1, E-Prescribe      QUEtiapine (SEROQUEL XR) 150 MG TB24 24 hr tablet Take 2 tablets (300 mg) by mouth At Bedtime, Disp-60 tablet, R-3, E-Prescribe      QUEtiapine (SEROQUEL) 50 MG tablet Take 50 mg by mouth daily (In addition to the 300 mg nighttime dose), Historical      traZODone (DESYREL) 100 MG tablet TAKE 1 TABLET NIGHTLY AS NEEDED FOR SLEEP, Disp-30 tablet, R-3, E-Prescribe      valACYclovir (VALTREX) 1000 mg tablet Take 2,000 mg by mouth as needed (onset of cold sore), Historical       !! - Potential duplicate medications found. Please discuss with provider.      STOP taking these medications       metoprolol succinate ER (TOPROL XL) 25 MG 24 hr tablet Comments:   Reason for Stopping:             Allergies   Allergies   Allergen Reactions     Bactrim [Sulfamethoxazole W/Trimethoprim] Hives     Codeine Itching     NAUSEA     Morphine Itching     NAUSEA

## 2022-08-11 NOTE — PROGRESS NOTES
Brief cardiology note:  She is doing well this morning and eager for discharge. Denies chest pain or shortness of breath. She has been walking the halls without difficulty. On exam, RRR and lungs clear. Blood pressures are well controlled this morning. Femoral access sites are stable this morning.    She has follow up arranged with the TAVR clinic on 8/18/22 at 3:10 pm.    Home today. Please call with questions.   JURGEN Rodríguez PA-C 9:28 AM 8/11/2022

## 2022-08-11 NOTE — PLAN OF CARE
Goal Outcome Evaluation:  Overnight reported SOB after ambulation to bathroom. Sat 89-90% on RA, denies CP. Sat 97% on 2L/NC. Encouraged cough, deep breath. SOB lasted for few minutes with no further complain. Back to RA 02. Now  on RA denies SOB with frequent ambulation. R/L groin site CDI, no hematoma/swelling. Neuros /CMS intact. Up with SBA/indep. Plans for cardiac rehab & discharge today.

## 2022-08-11 NOTE — PLAN OF CARE
Patient discharged home with family transporting. Denies pain and SOB. Up independently in room without issue, tolerating ambulation. Groin sites WDL. BP stable. All discharge instructions reviewed with patient including medications, follow up appointments, and activity instructions. All questions answered.

## 2022-08-12 ENCOUNTER — PATIENT OUTREACH (OUTPATIENT)
Dept: CARE COORDINATION | Facility: CLINIC | Age: 77
End: 2022-08-12

## 2022-08-12 DIAGNOSIS — Z71.89 OTHER SPECIFIED COUNSELING: ICD-10-CM

## 2022-08-12 NOTE — PROGRESS NOTES
[facility] Care Resource Center Contact  [facility]/Voicemail     Clinical Data: Care Coordinator Outreach - TCM      Outreach attempted x 1.  Left message on patient's voicemail, providing [facility]'s 24/7 scheduling and nurse triage phone number [phone] ([phone]) for questions/concerns and/or to schedule an appt with an [facility] provider, if they do not have a PCP.      Plan:  [facility] will try to reach patient again in 1-2 business days.       Melva Munguia RN  [facility] Resource Center, [facility]    *Connected Care Resource Team does NOT follow patient ongoing. Referrals are identified based on internal discharge reports and the outreach is to ensure patient has an understanding of their discharge instructions.

## 2022-08-13 LAB
ATRIAL RATE - MUSE: 81 BPM
DIASTOLIC BLOOD PRESSURE - MUSE: NORMAL MMHG
INTERPRETATION ECG - MUSE: NORMAL
P AXIS - MUSE: 73 DEGREES
PR INTERVAL - MUSE: 260 MS
QRS DURATION - MUSE: 118 MS
QT - MUSE: 422 MS
QTC - MUSE: 490 MS
R AXIS - MUSE: 160 DEGREES
SYSTOLIC BLOOD PRESSURE - MUSE: NORMAL MMHG
T AXIS - MUSE: 63 DEGREES
VENTRICULAR RATE- MUSE: 81 BPM

## 2022-08-13 NOTE — PROGRESS NOTES
"  Clinic Care Coordination Contact  [facility]: Post-Discharge Note  SITUATION                                                      Admission:    Admission Date: 08/09/22   Reason for Admission: TAVR procedure  Discharge:   Discharge Date: 08/11/22  Discharge Diagnosis: TAVR valve    BACKGROUND                                                      Per hospital discharge summary and inpatient provider notes:    Kavitha Headley is a 76 year old female with hx of severe aortic stenosis, CHF, paroxysmal SVT, and obesity s/p gastric bypass who was admitted on 8/9/2022 for planned TAVR. She underwent the procedure with no immediate complications. Post-TAVR TTE showed hyperdynamic LV (EF 75%) with well-seated TAVR valve, normal gradient.      ASSESSMENT      Enrollment  Primary Care Care Coordination Status: Potential    Discharge Assessment  How are you doing now that you are home?: \"I'm actually doing fine, no issues\". Pt was on her way out the door when I called. I offered care coordination and she said she will follow up with clinic if she wants to partake.  How are your symptoms? (Red Flag symptoms escalate to triage hotline per guidelines): Unchanged  Do you feel your condition is stable enough to be safe at home until your provider visit?: Yes  Does the patient have their discharge instructions? : Yes  Does the patient have questions regarding their discharge instructions? : No  Does the patient have all of their medications?: Yes  Do you have questions regarding any of your medications? : No  Discharge follow-up appointment scheduled within 14 calendar days? : Yes  Discharge Follow Up Appointment Date: 08/18/22  Discharge Follow Up Appointment Scheduled with?: Specialty Care Provider         Post-op (Clinicians Only)  Did the patient have surgery or a procedure: Yes  Incision: healing  Drainage: No  Fever: No  Chills: No  Redness: No  Warmth: No  Swelling: No  Incision site pain: No  Closure: other  Eating & " Drinking: eating and drinking without complaints/concerns  PO Intake: regular diet  Bowel Function: normal  Urinary Status: voiding without complaint/concerns      PLAN                                                      Outpatient Plan:      Follow up with Cardiology as scheduled  Cardiac Rehab    Future Appointments   Date Time Provider Department Center   8/18/2022  3:10 PM Emebr Bush, CNP [facility] PSA CLIN   8/23/2022  2:00 PM 3, Sh Cardiac Rehab [facility]   9/21/2022 11:00 AM [id number] [facility] CVIMG   9/21/2022 12:30 PM OLMOS LAB [facility]   9/21/2022  1:50 PM Ember Bush, CNP [facility] PSA CLIN   12/20/2022  3:00 PM [facility]         For any urgent concerns, please contact our 24 hour nurse triage line: [phone] ([phone])         Rhoda Tan RN

## 2022-08-18 ENCOUNTER — OFFICE VISIT (OUTPATIENT)
Dept: CARDIOLOGY | Facility: CLINIC | Age: 77
End: 2022-08-18
Payer: COMMERCIAL

## 2022-08-18 VITALS
OXYGEN SATURATION: 98 % | DIASTOLIC BLOOD PRESSURE: 72 MMHG | HEIGHT: 64 IN | SYSTOLIC BLOOD PRESSURE: 127 MMHG | WEIGHT: 188.9 LBS | BODY MASS INDEX: 32.25 KG/M2 | HEART RATE: 79 BPM

## 2022-08-18 DIAGNOSIS — Z95.2 S/P TAVR (TRANSCATHETER AORTIC VALVE REPLACEMENT): Primary | ICD-10-CM

## 2022-08-18 PROCEDURE — 99215 OFFICE O/P EST HI 40 MIN: CPT | Performed by: NURSE PRACTITIONER

## 2022-08-18 NOTE — PATIENT INSTRUCTIONS
Today's Plan:     1) Start cardiac rehab next week.     2) Follow-up with me in 1 month with repeat echocardiogram, labs, and EKG.     lAie RN ([phone]), Gabrielle RN ([phone]), and Rachel RN ([phone])    Scheduling phone number: [phone]    Reminder: Please bring in all current medications, over the counter supplements and vitamin bottles to your next appointment.-    It was a pleasure seeing you today!     Ember Bush, ADITHYA  8/18/2022    -

## 2022-08-18 NOTE — LETTER
8/18/2022    Nayeli Castorena PA-C  [address] [facility] [address]    RE: Kavitha Headley       Dear Colleague,     I had the pleasure of seeing Kavitha Headley in the [facility].  Cardiology Clinic Progress Note  Kavitha Headley MRN# [id number]   YOB: 1945 Age: 76 year old     Primary cardiologist: Dr. Ugarte     Reason for visit: 1-week TAVR follow-up     History of presenting illness:    Kavitha Headley is a pleasant 76 year old patient with past medical history significant for severe aortic stenosis characterized by RENE of 0.97 cm2, mean gradient 48 mmHg, peak velocity of 4.5 m/s, and LVEF 60 to 65%.  She underwent elective transcatheter aortic valve replacement with 23 mm Payne OLIVA ultra valve on 8/9/2022.  Her procedure was uncomplicated.  EKG showed progression of her first-degree AV block post TAVR, as such, her beta-blocker was held at discharge.  Post- procedure echocardiogram showed well-seated bioprosthetic valve with normal gradient, and no paravalvular leak.    Other past medical history is notable for substance use disorder status post multiple rehab admissions and interventions, mildly dilated ascending aorta measuring 4.1 cm, paroxysmal SVT and PACs, hypertension, hyperlipidemia, and obesity status post gastric bypass surgery.    She is here today for her 1 week follow-up and reports doing well from a cardiovascular standpoint.  She endorses ongoing mild dyspnea and fatigue, but feels this is improving.  She otherwise denies chest pain, syncope or near syncope, or palpitations.  Her groin sites have healed well without any significant bleeding or bruising.    Her blood pressure is well controlled today at 127/72.    I reviewed her most recent labs from 8/11/2022 that show creatinine of 0.93, GFR 63, normal electrolytes, and hemoglobin of 10.3.         Assessment and Plan:     ASSESSMENT:    1. Severe aortic stenosis status post TAVR with 23 mm S3  valve on 8/9/22. Post-procedure echo with normal gradient and no PVL. LVEF 75%. Reports improvement with fatigue and dyspnea. Has not started cardiac rehab. On ASA 81 mg daily for antiplatelet therapy.   2. Hypertension. BB was stopped during admission due to progression of AVB, though, pressures remain well-controlled.   3. Hyperlipidemia. LDL 85 on pravastatin 20 mg daily.   4. Mildly dilated ascending aorta measuring 4.1 cm  5. Paroxysmal SVT and PACs. Currently off BB, as noted above.   6. Obesity status post gastric bypass   7. History of substance use disorder.  Remains sober.       PLAN:     1. Continue aspirin 81 mg daily for antiplatelet therapy.  2. Start outpatient cardiac rehab next week.  3. We discussed the need for antibiotic prophylaxis prior to any dental procedure.  4. We will continue to hold beta-blocker in the setting of progression of her first-degree AV block, her blood pressure remains well controlled.  Though, may need to reconsider if she has recurrence of SVT or PACs.  5. Follow-up with me in 1 month with repeat echocardiogram, labs, and EKG.         Ember Bush, DNP, APRN, CNP  Page: [phone] (8a-5p M-F)    Orders this Visit:  No orders of the defined types were placed in this encounter.    No orders of the defined types were placed in this encounter.    Medications Discontinued During This Encounter   Medication Reason     MISC NATURAL PRODUCTS PO Stopped by Patient       Today's clinic visit entailed:  Review of the result(s) of each unique test - EKG, TAVR, echo, labs  Ordering of each unique test  Prescription drug management  45 minutes spent on the date of the encounter doing chart review, review of test results, interpretation of tests, patient visit and documentation   Provider  Link to [facility] Help Grid     The level of medical decision making during this visit was of moderate complexity.           Review of Systems:     Review of Systems:  Skin:  not assessed     Eyes:  not  "assessed glasses  ENT:  not assessed    Respiratory:  Positive for dyspnea on exertion  Cardiovascular:  Negative;palpitations;chest pain;edema;lightheadedness Positive for;dizziness;fatigue  Gastroenterology: not assessed    Genitourinary:  not assessed    Musculoskeletal:  not assessed arthritis;foot pain;back pain  Neurologic:  Positive for numbness or tingling of hands  Psychiatric:  not assessed  (4-5 hrs per night)  Heme/Lymph/Imm:  not assessed allergies  Endocrine:  not assessed              Physical Exam:   Vitals: /72   Pulse 79   Ht 1.626 m (5' 4\")   Wt 85.7 kg (188 lb 14.4 oz)   SpO2 98%   BMI 32.42 kg/m    Constitutional:  cooperative, alert and oriented, well developed, well nourished, in no acute distress        Skin:  warm and dry to the touch        Head:  normocephalic        Eyes:  pupils equal and round        ENT:           Neck:  JVP normal        Chest:  normal breath sounds, clear to auscultation, normal A-P diameter, normal symmetry, normal respiratory excursion, no use of accessory muscles        Cardiac: regular rhythm;normal S1 and S2       systolic murmur;grade 1          Abdomen:  abdomen soft        Vascular: pulses full and equal, no bruits auscultated                                      Extremities and Back:  no edema        Neurological:  affect appropriate;no gross motor deficits             Medications:     Current Outpatient Medications   Medication Sig Dispense Refill     acetaminophen (TYLENOL) 500 MG tablet Take 1,000 mg by mouth 2 times daily as needed for pain       aspirin 81 MG EC tablet Take 81 mg by mouth daily       bisacodyl (DULCOLAX) 5 MG EC tablet Take 5 mg by mouth daily as needed for constipation       folic acid (FOLVITE) 1 MG tablet Take 1 mg by mouth daily       gabapentin (NEURONTIN) 600 MG tablet Take 1 tablet (600 mg) by mouth At Bedtime 90 tablet 1     MISC NATURAL PRODUCTS PO Take 1 tablet by mouth daily *L-Tryptophan 1000 mg*       Multiple " Vitamins-Minerals (CENTRUM SILVER) per tablet Take 1 tablet by mouth daily       polyethylene glycol (MIRALAX) 17 GM/Dose powder Take 17 g by mouth 2 times daily as needed for constipation       pravastatin (PRAVACHOL) 20 MG tablet Take 1 tablet (20 mg) by mouth daily 90 tablet 1     QUEtiapine (SEROQUEL XR) 150 MG TB24 24 hr tablet Take 2 tablets (300 mg) by mouth At Bedtime (Patient taking differently: Take 300 mg by mouth At Bedtime (2 x 150 mg = 300 mg; in addition to 50 mg nighttime dose)) 60 tablet 3     QUEtiapine (SEROQUEL) 50 MG tablet Take 50 mg by mouth daily (In addition to the 300 mg nighttime dose)       traZODone (DESYREL) 100 MG tablet TAKE 1 TABLET NIGHTLY AS NEEDED FOR SLEEP (Patient taking differently: Take 100 mg by mouth At Bedtime) 30 tablet 3     valACYclovir (VALTREX) 1000 mg tablet Take 2,000 mg by mouth as needed (onset of cold sore)       MISC NATURAL PRODUCTS PO Take 1 capsule by mouth daily *B-glucan capsule* (Patient not taking: Reported on 8/18/2022)         Family History   Problem Relation Age of Onset     Substance Abuse Father      Cancer Father         throat and lung mets     Diabetes No family hx of      Coronary Artery Disease No family hx of      Cerebrovascular Disease No family hx of        Social History     Socioeconomic History     Marital status:      Spouse name: Mt     Number of children: 4     Years of education: 18     Highest education level: Not on file   Occupational History     Occupation: nurse     Employer: [employer]     Employer: RETIRED   Tobacco Use     Smoking status: Never Smoker     Smokeless tobacco: Never Used   Substance and Sexual Activity     Alcohol use: Not Currently     Alcohol/week: 63.0 standard drinks     Types: 63 Standard drinks or equivalent per week     Drug use: No     Sexual activity: Yes     Partners: Male   Other Topics Concern      Service Not Asked     Blood Transfusions No     Caffeine Concern Yes     Comment: 1-2 cups  per day      Occupational Exposure Yes     Comment: blood     Hobby Hazards No     Sleep Concern Yes     Stress Concern Yes     Weight Concern Yes     Comment: gastric  byepass     Special Diet No     Back Care No     Exercise Yes     Comment: walk, swin     Bike Helmet No     Seat Belt Yes     Self-Exams Yes     Parent/sibling w/ CABG, MI or angioplasty before 65F 55M? Not Asked   Social History Narrative     Not on file     Social Determinants of Health     Financial Resource Strain: Not on file   Food Insecurity: Not on file   Transportation Needs: Not on file   Physical Activity: Not on file   Stress: Not on file   Social Connections: Not on file   Intimate Partner Violence: Not on file   Housing Stability: Not on file            Past Medical History:     Past Medical History:   Diagnosis Date     Alcohol abuse      Anxiety disorder      Ascending aorta dilatation (H)      Bariatric surgery status 1996?    gastric bypass, [facility] [employer] and     Benign hypertension      Chronic insomnia      Chronic pain syndrome     Chronic back and neck pain, chronic pain due to osteoarthritis multiple joints     Coronary artery disease involving native coronary artery of native heart without angina pectoris 10/16/2018    Minimal coronary artery disease on coronary angiogram in 2015.      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     Major depressive disorder, single episode, severe, without mention of psychotic behavior      Mixed hyperlipidemia      Pelvic relaxation disorder     Surgical intervention for cystocele/rectocele 3,11/2012     Personal history of urinary calculi 06/2006    left ureteral stone,lithotripsy     Psoriasis      PVC (premature ventricular contraction)       Severe aortic stenosis      Spinal stenosis      Stage III chronic kidney disease (H) 2005     SVT (supraventricular tachycardia) (H)     likely atrial tachycardia              Past Surgical History:     Past Surgical History:   Procedure Laterality Date     APPENDECTOMY  3/2004    incidental     ARTHRODESIS TOE(S) Right 1/31/2020    Procedure: RIGHT FIRST METATARSAL PHALANGEAL JOINT ARTHRODESIS;  Surgeon: Steven Reyes MD;  Location:  OR     C MEDIASTINOSCOPY W OR WO BIOPSY  2/2008    Videomediastinoscopy and, for mediastinal adenopathy -reactive lymphoid hyperplasia     CARPAL TUNNEL RELEASE RT/LT  10/2010    Carpometacarpal excisional arthroplasty with a fascial autograft and APL suspension sling (30208). 2. Left thumb metacarpophalangeal joint fusion with autologous bone graft (81623). 3. Left endoscopic carpal tunnel release      CHOLECYSTECTOMY, LAPOROSCOPIC  11/2010    Cholecystectomy, Laparoscopic     COLONOSCOPY N/A 9/8/2016    Procedure: COMBINED COLONOSCOPY, SINGLE OR MULTIPLE BIOPSY/POLYPECTOMY BY BIOPSY;  Surgeon: Moe Barlow MD;  Location:  GI     CV CORONARY ANGIOGRAM N/A 6/20/2022    Procedure: Coronary Angiogram;  Surgeon: Nora Parker MD;  Location: [facility] CARDIAC CATH LAB     CV PCI N/A 6/20/2022    Procedure: Percutaneous Coronary Intervention;  Surgeon: Nora Parker MD;  Location: [facility] CARDIAC CATH LAB     CV RIGHT HEART CATH MEASUREMENTS RECORDED N/A 6/20/2022    Procedure: Right Heart Catheterization;  Surgeon: Nora Parker MD;  Location:  HEART CARDIAC CATH LAB     CV TRANSCATHETER AORTIC VALVE REPLACEMENT-FEMORAL APPROACH N/A 8/9/2022    Procedure: Transcatheter Aortic Valve Replacement-Femoral Approach;  Surgeon: Nora Parker MD;  Location: [facility] CARDIAC CATH LAB     CYSTOCELE REPAIR  11/2012    davinci laparoscopic sacrocolpopexy, enterocele repair, lysis of adhesions, placement of retropubic mid urethral sling, cystoscopy     CYSTOSCOPY,  LITHOTRIPSY, COMBINED  6/2006    Left extracorporeal shock wave lithotripsy, cystoscopy, left ureteral stent placement.     CYSTOSCOPY, REMOVE STENT(S), COMBINED  7/2006    Cystoscopy, removal of left ureteral stent, retrograde pyelography, flexible and rigid ureteroscopy and holmium laser lithotripsy, basket removal of stone fragments, ureteral stent placement.      ENDOSCOPIC ULTRASOUND UPPER GASTROINTESTINAL TRACT (GI) N/A 6/12/2017    Procedure: ENDOSCOPIC ULTRASOUND, ESOPHAGOSCOPY / UPPER GASTROINTESTINAL TRACT (GI);  ENDOSCOPIC ULTRASOUND, ESOPHAGOSCOPY / UPPER GASTROINTESTINAL TRACT (GI);  Surgeon: Parth Graham MD;  Location:  GI     HERNIA REPAIR  4/2012    bilateral augmentation mastopexy, ventral hernia repair, and medial thigh liposuction on 04/06/2012.      HYSTERECTOMY VAGINAL, BILATERAL SALPINGO-OOPHERECTOMY, COMBINED  1998    due to myoma and bleeding     JOINT REPLACEMENT, HIP RT/LT  4/2004    right total hip arthroplasty     LAPAROTOMY, LYSIS ADHESIONS, COMBINED  3/2004    lysis adhesions, ventral hernia repair, appendectomy incidentally     LYMPH NODE BIOPSY  4/2008    right axillary, reactive follicular and paracortical hyperplasia.     MAMMOPLASTY AUGMENTATION BILATERAL  4/2012     REPAIR HAMMER TOE Right 1/31/2020    Procedure: WITH SECOND AND THIRD CLAW TOE RECONSTRUCTION;  Surgeon: Steven Reyes MD;  Location:  OR     REVISE RECONSTRUCTED BREAST  6/7/2012    Left breast capsulotomy.      ZZC GASTRIC BYPASS,OBESE<100CM ARIANNA-EN-Y  1996     [facility] REPAIR OF RECTOCELE  3/2012     [facility] TOTAL KNEE ARTHROPLASTY  12/2005    left               Allergies:   Bactrim [sulfamethoxazole w/trimethoprim], Codeine, and Morphine       Data:   All laboratory data reviewed:    Recent Labs   Lab Test 08/02/22  1640 03/24/22  0721 10/20/21  1251 03/03/21  1612 02/12/20  0534 06/26/19  1012 08/04/18  0900 02/05/18  1252   LDL  --   --  85 145* 218*   < >  --   --    HDL  --   --  86 140 81   < >  --    --    NHDL  --   --  132* 160* 236*   < >  --   --    CHOL  --   --  218* 300* 317*   < >  --   --    TRIG  --   --  234* 77 91   < >  --   --    TSH  --   --   --  1.05  --   --  3.05 1.40   NTBNP 1,188  --   --   --   --   --   --   --    IRON  --  40  --   --   --   --   --   --    FEB  --  274  --   --   --   --   --   --    IRONSAT  --  15  --   --   --   --   --   --    CHUCK  --  29  --  16  --    < >  --   --     < > = values in this interval not displayed.       Lab Results   Component Value Date    WBC 7.7 08/11/2022    WBC 5.6 03/27/2021    RBC 3.53 (L) 08/11/2022    RBC 3.64 (L) 03/27/2021    HGB 10.3 (L) 08/11/2022    HGB 10.7 (L) 03/27/2021    HCT 32.6 (L) 08/11/2022    HCT 33.4 (L) 03/27/2021    MCV 92 08/11/2022    MCV 92 03/27/2021    MCH 29.2 08/11/2022    MCH 29.4 03/27/2021    MCHC 31.6 08/11/2022    MCHC 32.0 03/27/2021    RDW 17.2 (H) 08/11/2022    RDW 15.7 (H) 03/27/2021     08/11/2022     03/27/2021       Lab Results   Component Value Date     08/11/2022     05/12/2021    POTASSIUM 4.1 08/11/2022    POTASSIUM 4.0 05/12/2021    CHLORIDE 104 08/11/2022    CHLORIDE 107 05/12/2021    CO2 28 08/11/2022    CO2 30 05/12/2021    ANIONGAP 5 08/11/2022    ANIONGAP 3 05/12/2021     (H) 08/11/2022     (H) 05/12/2021    BUN 13 08/11/2022    BUN 15 05/12/2021    CR 0.93 08/11/2022    CR 1.00 05/12/2021    GFRESTIMATED 63 08/11/2022    GFRESTIMATED 54 (L) 05/12/2021    GFRESTBLACK 63 05/12/2021    BIRGIT 9.3 08/11/2022    BIRGIT 9.4 05/12/2021      Lab Results   Component Value Date    AST 41 03/10/2022    AST 20 03/30/2021    ALT 23 03/10/2022    ALT 16 03/30/2021       Lab Results   Component Value Date    A1C 5.7 09/29/2010       Lab Results   Component Value Date    INR 0.95 08/02/2022    INR 0.98 06/20/2022    INR 0.9 03/30/2021    INR 1.03 06/16/2017       Thank you for allowing me to participate in the care of your patient.      Sincerely,     Ember Bush, CNP      [facility]  cc: No referring provider defined for this encounter.

## 2022-08-18 NOTE — PROGRESS NOTES
Cardiology Clinic Progress Note  Kavitha Headley MRN# [id number]   YOB: 1945 Age: 76 year old     Primary cardiologist: Dr. Ugarte     Reason for visit: 1-week TAVR follow-up     History of presenting illness:    Kavitha Headley is a pleasant 76 year old patient with past medical history significant for severe aortic stenosis characterized by RENE of 0.97 cm2, mean gradient 48 mmHg, peak velocity of 4.5 m/s, and LVEF 60 to 65%.  She underwent elective transcatheter aortic valve replacement with 23 mm Payne OLIVA ultra valve on 8/9/2022.  Her procedure was uncomplicated.  EKG showed progression of her first-degree AV block post TAVR, as such, her beta-blocker was held at discharge.  Post- procedure echocardiogram showed well-seated bioprosthetic valve with normal gradient, and no paravalvular leak.    Other past medical history is notable for substance use disorder status post multiple rehab admissions and interventions, mildly dilated ascending aorta measuring 4.1 cm, paroxysmal SVT and PACs, hypertension, hyperlipidemia, and obesity status post gastric bypass surgery.    She is here today for her 1 week follow-up and reports doing well from a cardiovascular standpoint.  She endorses ongoing mild dyspnea and fatigue, but feels this is improving.  She otherwise denies chest pain, syncope or near syncope, or palpitations.  Her groin sites have healed well without any significant bleeding or bruising.    Her blood pressure is well controlled today at 127/72.    I reviewed her most recent labs from 8/11/2022 that show creatinine of 0.93, GFR 63, normal electrolytes, and hemoglobin of 10.3.         Assessment and Plan:     ASSESSMENT:    1. Severe aortic stenosis status post TAVR with 23 mm S3 valve on 8/9/22. Post-procedure echo with normal gradient and no PVL. LVEF 75%. Reports improvement with fatigue and dyspnea. Has not started cardiac rehab. On ASA 81 mg daily for antiplatelet therapy.    2. Hypertension. BB was stopped during admission due to progression of AVB, though, pressures remain well-controlled.   3. Hyperlipidemia. LDL 85 on pravastatin 20 mg daily.   4. Mildly dilated ascending aorta measuring 4.1 cm  5. Paroxysmal SVT and PACs. Currently off BB, as noted above.   6. Obesity status post gastric bypass   7. History of substance use disorder.  Remains sober.       PLAN:     1. Continue aspirin 81 mg daily for antiplatelet therapy.  2. Start outpatient cardiac rehab next week.  3. We discussed the need for antibiotic prophylaxis prior to any dental procedure.  4. We will continue to hold beta-blocker in the setting of progression of her first-degree AV block, her blood pressure remains well controlled.  Though, may need to reconsider if she has recurrence of SVT or PACs.  5. Follow-up with me in 1 month with repeat echocardiogram, labs, and EKG.         Ember Bush DNP, APRN, CNP  Page: [phone] (8a-5p M-F)    Orders this Visit:  No orders of the defined types were placed in this encounter.    No orders of the defined types were placed in this encounter.    Medications Discontinued During This Encounter   Medication Reason     MISC NATURAL PRODUCTS PO Stopped by Patient       Today's clinic visit entailed:  Review of the result(s) of each unique test - EKG, TAVR, echo, labs  Ordering of each unique test  Prescription drug management  45 minutes spent on the date of the encounter doing chart review, review of test results, interpretation of tests, patient visit and documentation   Provider  Link to [facility] Help Grid     The level of medical decision making during this visit was of moderate complexity.           Review of Systems:     Review of Systems:  Skin:  not assessed     Eyes:  not assessed glasses  ENT:  not assessed    Respiratory:  Positive for dyspnea on exertion  Cardiovascular:  Negative;palpitations;chest pain;edema;lightheadedness Positive  "for;dizziness;fatigue  Gastroenterology: not assessed    Genitourinary:  not assessed    Musculoskeletal:  not assessed arthritis;foot pain;back pain  Neurologic:  Positive for numbness or tingling of hands  Psychiatric:  not assessed  (4-5 hrs per night)  Heme/Lymph/Imm:  not assessed allergies  Endocrine:  not assessed              Physical Exam:   Vitals: /72   Pulse 79   Ht 1.626 m (5' 4\")   Wt 85.7 kg (188 lb 14.4 oz)   SpO2 98%   BMI 32.42 kg/m    Constitutional:  cooperative, alert and oriented, well developed, well nourished, in no acute distress        Skin:  warm and dry to the touch        Head:  normocephalic        Eyes:  pupils equal and round        ENT:           Neck:  JVP normal        Chest:  normal breath sounds, clear to auscultation, normal A-P diameter, normal symmetry, normal respiratory excursion, no use of accessory muscles        Cardiac: regular rhythm;normal S1 and S2       systolic murmur;grade 1          Abdomen:  abdomen soft        Vascular: pulses full and equal, no bruits auscultated                                      Extremities and Back:  no edema        Neurological:  affect appropriate;no gross motor deficits             Medications:     Current Outpatient Medications   Medication Sig Dispense Refill     acetaminophen (TYLENOL) 500 MG tablet Take 1,000 mg by mouth 2 times daily as needed for pain       aspirin 81 MG EC tablet Take 81 mg by mouth daily       bisacodyl (DULCOLAX) 5 MG EC tablet Take 5 mg by mouth daily as needed for constipation       folic acid (FOLVITE) 1 MG tablet Take 1 mg by mouth daily       gabapentin (NEURONTIN) 600 MG tablet Take 1 tablet (600 mg) by mouth At Bedtime 90 tablet 1     MISC NATURAL PRODUCTS PO Take 1 tablet by mouth daily *L-Tryptophan 1000 mg*       Multiple Vitamins-Minerals (CENTRUM SILVER) per tablet Take 1 tablet by mouth daily       polyethylene glycol (MIRALAX) 17 GM/Dose powder Take 17 g by mouth 2 times daily as needed " for constipation       pravastatin (PRAVACHOL) 20 MG tablet Take 1 tablet (20 mg) by mouth daily 90 tablet 1     QUEtiapine (SEROQUEL XR) 150 MG TB24 24 hr tablet Take 2 tablets (300 mg) by mouth At Bedtime (Patient taking differently: Take 300 mg by mouth At Bedtime (2 x 150 mg = 300 mg; in addition to 50 mg nighttime dose)) 60 tablet 3     QUEtiapine (SEROQUEL) 50 MG tablet Take 50 mg by mouth daily (In addition to the 300 mg nighttime dose)       traZODone (DESYREL) 100 MG tablet TAKE 1 TABLET NIGHTLY AS NEEDED FOR SLEEP (Patient taking differently: Take 100 mg by mouth At Bedtime) 30 tablet 3     valACYclovir (VALTREX) 1000 mg tablet Take 2,000 mg by mouth as needed (onset of cold sore)       MISC NATURAL PRODUCTS PO Take 1 capsule by mouth daily *B-glucan capsule* (Patient not taking: Reported on 8/18/2022)         Family History   Problem Relation Age of Onset     Substance Abuse Father      Cancer Father         throat and lung mets     Diabetes No family hx of      Coronary Artery Disease No family hx of      Cerebrovascular Disease No family hx of        Social History     Socioeconomic History     Marital status:      Spouse name: Mt     Number of children: 4     Years of education: 18     Highest education level: Not on file   Occupational History     Occupation: nurse     Employer: [employer]     Employer: RETIRED   Tobacco Use     Smoking status: Never Smoker     Smokeless tobacco: Never Used   Substance and Sexual Activity     Alcohol use: Not Currently     Alcohol/week: 63.0 standard drinks     Types: 63 Standard drinks or equivalent per week     Drug use: No     Sexual activity: Yes     Partners: Male   Other Topics Concern      Service Not Asked     Blood Transfusions No     Caffeine Concern Yes     Comment: 1-2 cups per day      Occupational Exposure Yes     Comment: blood     Hobby Hazards No     Sleep Concern Yes     Stress Concern Yes     Weight Concern Yes     Comment: gastric   byepass     Special Diet No     Back Care No     Exercise Yes     Comment: walk, swin     Bike Helmet No     Seat Belt Yes     Self-Exams Yes     Parent/sibling w/ CABG, MI or angioplasty before 65F 55M? Not Asked   Social History Narrative     Not on file     Social Determinants of Health     Financial Resource Strain: Not on file   Food Insecurity: Not on file   Transportation Needs: Not on file   Physical Activity: Not on file   Stress: Not on file   Social Connections: Not on file   Intimate Partner Violence: Not on file   Housing Stability: Not on file            Past Medical History:     Past Medical History:   Diagnosis Date     Alcohol abuse      Anxiety disorder      Ascending aorta dilatation (H)      Bariatric surgery status 1996?    gastric bypass, [facility] and     Benign hypertension      Chronic insomnia      Chronic pain syndrome     Chronic back and neck pain, chronic pain due to osteoarthritis multiple joints     Coronary artery disease involving native coronary artery of native heart without angina pectoris 10/16/2018    Minimal coronary artery disease on coronary angiogram in 2015.      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     Major depressive disorder, single episode, severe, without mention of psychotic behavior      Mixed hyperlipidemia      Pelvic relaxation disorder     Surgical intervention for cystocele/rectocele 3,11/2012     Personal history of urinary calculi 06/2006    left ureteral stone,lithotripsy     Psoriasis      PVC (premature ventricular contraction)      Severe aortic stenosis      Spinal stenosis      Stage III chronic kidney disease (H) 2005     SVT (supraventricular tachycardia) (H)     likely atrial tachycardia               Past Surgical History:     Past Surgical History:   Procedure Laterality Date     APPENDECTOMY  3/2004    incidental     ARTHRODESIS TOE(S) Right 1/31/2020    Procedure: RIGHT FIRST METATARSAL PHALANGEAL JOINT ARTHRODESIS;  Surgeon: Steven Reyes MD;  Location:  OR     C MEDIASTINOSCOPY W OR WO BIOPSY  2/2008    Videomediastinoscopy and, for mediastinal adenopathy -reactive lymphoid hyperplasia     CARPAL TUNNEL RELEASE RT/LT  10/2010    Carpometacarpal excisional arthroplasty with a fascial autograft and APL suspension sling (95153). 2. Left thumb metacarpophalangeal joint fusion with autologous bone graft (10908). 3. Left endoscopic carpal tunnel release      CHOLECYSTECTOMY, LAPOROSCOPIC  11/2010    Cholecystectomy, Laparoscopic     COLONOSCOPY N/A 9/8/2016    Procedure: COMBINED COLONOSCOPY, SINGLE OR MULTIPLE BIOPSY/POLYPECTOMY BY BIOPSY;  Surgeon: Moe Barlow MD;  Location:  GI     CV CORONARY ANGIOGRAM N/A 6/20/2022    Procedure: Coronary Angiogram;  Surgeon: Nora Parker MD;  Location:  HEART CARDIAC CATH LAB     CV PCI N/A 6/20/2022    Procedure: Percutaneous Coronary Intervention;  Surgeon: Nora Parker MD;  Location:  HEART CARDIAC CATH LAB     CV RIGHT HEART CATH MEASUREMENTS RECORDED N/A 6/20/2022    Procedure: Right Heart Catheterization;  Surgeon: Nora Parker MD;  Location:  HEART CARDIAC CATH LAB     CV TRANSCATHETER AORTIC VALVE REPLACEMENT-FEMORAL APPROACH N/A 8/9/2022    Procedure: Transcatheter Aortic Valve Replacement-Femoral Approach;  Surgeon: Nora Parker MD;  Location:  HEART CARDIAC CATH LAB     CYSTOCELE REPAIR  11/2012    davinci laparoscopic sacrocolpopexy, enterocele repair, lysis of adhesions, placement of retropubic mid urethral sling, cystoscopy     CYSTOSCOPY, LITHOTRIPSY, COMBINED  6/2006    Left extracorporeal shock wave lithotripsy, cystoscopy, left ureteral stent placement.     CYSTOSCOPY, REMOVE STENT(S), COMBINED  7/2006     Cystoscopy, removal of left ureteral stent, retrograde pyelography, flexible and rigid ureteroscopy and holmium laser lithotripsy, basket removal of stone fragments, ureteral stent placement.      ENDOSCOPIC ULTRASOUND UPPER GASTROINTESTINAL TRACT (GI) N/A 6/12/2017    Procedure: ENDOSCOPIC ULTRASOUND, ESOPHAGOSCOPY / UPPER GASTROINTESTINAL TRACT (GI);  ENDOSCOPIC ULTRASOUND, ESOPHAGOSCOPY / UPPER GASTROINTESTINAL TRACT (GI);  Surgeon: Parth Graham MD;  Location:  GI     HERNIA REPAIR  4/2012    bilateral augmentation mastopexy, ventral hernia repair, and medial thigh liposuction on 04/06/2012.      HYSTERECTOMY VAGINAL, BILATERAL SALPINGO-OOPHERECTOMY, COMBINED  1998    due to myoma and bleeding     JOINT REPLACEMENT, HIP RT/LT  4/2004    right total hip arthroplasty     LAPAROTOMY, LYSIS ADHESIONS, COMBINED  3/2004    lysis adhesions, ventral hernia repair, appendectomy incidentally     LYMPH NODE BIOPSY  4/2008    right axillary, reactive follicular and paracortical hyperplasia.     MAMMOPLASTY AUGMENTATION BILATERAL  4/2012     REPAIR HAMMER TOE Right 1/31/2020    Procedure: WITH SECOND AND THIRD CLAW TOE RECONSTRUCTION;  Surgeon: Steven Reyes MD;  Location:  OR     REVISE RECONSTRUCTED BREAST  6/7/2012    Left breast capsulotomy.      ZZC GASTRIC BYPASS,OBESE<100CM ARIANNA-EN-Y  1996     Z REPAIR OF RECTOCELE  3/2012     [facility] TOTAL KNEE ARTHROPLASTY  12/2005    left               Allergies:   Bactrim [sulfamethoxazole w/trimethoprim], Codeine, and Morphine       Data:   All laboratory data reviewed:    Recent Labs   Lab Test 08/02/22  1640 03/24/22  0721 10/20/21  1251 03/03/21  1612 02/12/20  0534 06/26/19  1012 08/04/18  0900 02/05/18  1252   LDL  --   --  85 145* 218*   < >  --   --    HDL  --   --  86 140 81   < >  --   --    NHDL  --   --  132* 160* 236*   < >  --   --    CHOL  --   --  218* 300* 317*   < >  --   --    TRIG  --   --  234* 77 91   < >  --   --    TSH  --   --   --  1.05   --   --  3.05 1.40   NTBNP 1,188  --   --   --   --   --   --   --    IRON  --  40  --   --   --   --   --   --    FEB  --  274  --   --   --   --   --   --    IRONSAT  --  15  --   --   --   --   --   --    CHUCK  --  29  --  16  --    < >  --   --     < > = values in this interval not displayed.       Lab Results   Component Value Date    WBC 7.7 08/11/2022    WBC 5.6 03/27/2021    RBC 3.53 (L) 08/11/2022    RBC 3.64 (L) 03/27/2021    HGB 10.3 (L) 08/11/2022    HGB 10.7 (L) 03/27/2021    HCT 32.6 (L) 08/11/2022    HCT 33.4 (L) 03/27/2021    MCV 92 08/11/2022    MCV 92 03/27/2021    MCH 29.2 08/11/2022    MCH 29.4 03/27/2021    MCHC 31.6 08/11/2022    MCHC 32.0 03/27/2021    RDW 17.2 (H) 08/11/2022    RDW 15.7 (H) 03/27/2021     08/11/2022     03/27/2021       Lab Results   Component Value Date     08/11/2022     05/12/2021    POTASSIUM 4.1 08/11/2022    POTASSIUM 4.0 05/12/2021    CHLORIDE 104 08/11/2022    CHLORIDE 107 05/12/2021    CO2 28 08/11/2022    CO2 30 05/12/2021    ANIONGAP 5 08/11/2022    ANIONGAP 3 05/12/2021     (H) 08/11/2022     (H) 05/12/2021    BUN 13 08/11/2022    BUN 15 05/12/2021    CR 0.93 08/11/2022    CR 1.00 05/12/2021    GFRESTIMATED 63 08/11/2022    GFRESTIMATED 54 (L) 05/12/2021    GFRESTBLACK 63 05/12/2021    BIRGIT 9.3 08/11/2022    BIRGIT 9.4 05/12/2021      Lab Results   Component Value Date    AST 41 03/10/2022    AST 20 03/30/2021    ALT 23 03/10/2022    ALT 16 03/30/2021       Lab Results   Component Value Date    A1C 5.7 09/29/2010       Lab Results   Component Value Date    INR 0.95 08/02/2022    INR 0.98 06/20/2022    INR 0.9 03/30/2021    INR 1.03 06/16/2017

## 2022-08-19 LAB
ATRIAL RATE - MUSE: 87 BPM
DIASTOLIC BLOOD PRESSURE - MUSE: NORMAL MMHG
INTERPRETATION ECG - MUSE: NORMAL
P AXIS - MUSE: 27 DEGREES
PR INTERVAL - MUSE: 224 MS
QRS DURATION - MUSE: 84 MS
QT - MUSE: 382 MS
QTC - MUSE: 459 MS
R AXIS - MUSE: -71 DEGREES
SYSTOLIC BLOOD PRESSURE - MUSE: NORMAL MMHG
T AXIS - MUSE: 74 DEGREES
VENTRICULAR RATE- MUSE: 87 BPM

## 2022-08-24 ENCOUNTER — TRANSFERRED RECORDS (OUTPATIENT)
Dept: HEALTH INFORMATION MANAGEMENT | Facility: CLINIC | Age: 77
End: 2022-08-24

## 2022-09-21 ENCOUNTER — OFFICE VISIT (OUTPATIENT)
Dept: CARDIOLOGY | Facility: CLINIC | Age: 77
End: 2022-09-21
Attending: INTERNAL MEDICINE

## 2022-09-21 ENCOUNTER — LAB (OUTPATIENT)
Dept: LAB | Facility: CLINIC | Age: 77
End: 2022-09-21
Attending: INTERNAL MEDICINE
Payer: COMMERCIAL

## 2022-09-21 ENCOUNTER — HOSPITAL ENCOUNTER (OUTPATIENT)
Dept: CARDIOLOGY | Facility: CLINIC | Age: 77
Discharge: HOME | End: 2022-09-21
Attending: INTERNAL MEDICINE | Admitting: INTERNAL MEDICINE
Payer: COMMERCIAL

## 2022-09-21 VITALS
OXYGEN SATURATION: 96 % | HEIGHT: 64 IN | HEART RATE: 53 BPM | WEIGHT: 182.7 LBS | SYSTOLIC BLOOD PRESSURE: 118 MMHG | BODY MASS INDEX: 31.19 KG/M2 | DIASTOLIC BLOOD PRESSURE: 68 MMHG

## 2022-09-21 DIAGNOSIS — I35.0 SEVERE AORTIC STENOSIS: ICD-10-CM

## 2022-09-21 DIAGNOSIS — Z95.2 S/P TAVR (TRANSCATHETER AORTIC VALVE REPLACEMENT): Primary | ICD-10-CM

## 2022-09-21 DIAGNOSIS — Z95.2 S/P TAVR (TRANSCATHETER AORTIC VALVE REPLACEMENT): ICD-10-CM

## 2022-09-21 DIAGNOSIS — I35.0 AORTIC STENOSIS, SEVERE: Primary | ICD-10-CM

## 2022-09-21 LAB
ANION GAP SERPL CALCULATED.3IONS-SCNC: 6 MMOL/L (ref 3–14)
BUN SERPL-MCNC: 18 MG/DL (ref 7–30)
CALCIUM SERPL-MCNC: 9.5 MG/DL (ref 8.5–10.1)
CHLORIDE BLD-SCNC: 101 MMOL/L (ref 94–109)
CO2 SERPL-SCNC: 30 MMOL/L (ref 20–32)
CREAT SERPL-MCNC: 1.13 MG/DL (ref 0.52–1.04)
ERYTHROCYTE [DISTWIDTH] IN BLOOD BY AUTOMATED COUNT: 15.2 % (ref 10–15)
GFR SERPL CREATININE-BSD FRML MDRD: 50 ML/MIN/1.73M2
GLUCOSE BLD-MCNC: 102 MG/DL (ref 70–99)
HCT VFR BLD AUTO: 37.1 % (ref 35–47)
HGB BLD-MCNC: 11.4 G/DL (ref 11.7–15.7)
LVEF ECHO: NORMAL
MCH RBC QN AUTO: 29.2 PG (ref 26.5–33)
MCHC RBC AUTO-ENTMCNC: 30.7 G/DL (ref 31.5–36.5)
MCV RBC AUTO: 95 FL (ref 78–100)
PLATELET # BLD AUTO: 295 10E3/UL (ref 150–450)
POTASSIUM BLD-SCNC: 4.3 MMOL/L (ref 3.4–5.3)
RBC # BLD AUTO: 3.91 10E6/UL (ref 3.8–5.2)
SODIUM SERPL-SCNC: 137 MMOL/L (ref 133–144)
WBC # BLD AUTO: 6.2 10E3/UL (ref 4–11)

## 2022-09-21 PROCEDURE — 99214 OFFICE O/P EST MOD 30 MIN: CPT | Mod: 25 | Performed by: NURSE PRACTITIONER

## 2022-09-21 PROCEDURE — 36415 COLL VENOUS BLD VENIPUNCTURE: CPT | Performed by: INTERNAL MEDICINE

## 2022-09-21 PROCEDURE — 93306 TTE W/DOPPLER COMPLETE: CPT | Mod: 26 | Performed by: INTERNAL MEDICINE

## 2022-09-21 PROCEDURE — 999N000208 ECHOCARDIOGRAM COMPLETE

## 2022-09-21 PROCEDURE — 255N000002 HC RX 255 OP 636: Performed by: INTERNAL MEDICINE

## 2022-09-21 PROCEDURE — 85027 COMPLETE CBC AUTOMATED: CPT | Performed by: INTERNAL MEDICINE

## 2022-09-21 PROCEDURE — 80048 BASIC METABOLIC PNL TOTAL CA: CPT | Performed by: INTERNAL MEDICINE

## 2022-09-21 PROCEDURE — 93000 ELECTROCARDIOGRAM COMPLETE: CPT | Mod: 59 | Performed by: NURSE PRACTITIONER

## 2022-09-21 RX ADMIN — HUMAN ALBUMIN MICROSPHERES AND PERFLUTREN 9 ML: 10; .22 INJECTION, SOLUTION INTRAVENOUS at 12:11

## 2022-09-21 NOTE — PROGRESS NOTES
Cardiology Clinic Progress Note  Kavitha Headley MRN# [id number]   YOB: 1945 Age: 76 year old     Primary cardiologist: Dr. Ugarte     Reason for visit: 1-month TAVR follow-up     History of presenting illness:    Kavitha Headley is a pleasant 76 year old patient with past medical history significant for mildly dilated ascending aorta measuring 4.1 cm, paroxysmal SVT and PACs, hypertension, hyperlipidemia, obesity s/p gastric bypass surgery, chronic diastolic heart failure, substance use disorder, and severe aortic stenosis s/p TAVR with 23 mm Payne madyson 3 valve on 8/9/2022. She is here today for her 1-month follow-up.     In regards to her TAVR, the procedure was uncomplicated without any conduction or hemodynamic instability.  Postprocedure echocardiogram showed well-seated bioprosthetic valve with normal gradient, no paravalvular leak, and LVEF 60-65%. Her BB was held due to progression of 1st degree AVB. She is on aspirin 81 mg daily for antiplatelet therapy.    Today she reports doing well from a cardiovascular standpoint.  She denies any chest pain, shortness of breath, syncope or near syncope, or palpitations.  No PND orthopnea.  She has decided not to attend cardiac rehab, but walks about half a mile daily without any symptoms.    Her blood pressure is well controlled.  EKG today shows underlying first-degree AV block.     Echocardiogram today shows well-seated bioprosthetic valve with a mean gradient of 14 mmHg, trivial anterior pericardial effusion, mildly dilated ascending aorta measuring 4.2 cm, and LVEF of 55 to 60%.    Labs today show creatinine of 1.1, GFR 50, normal electrolytes.  Her CBC shows hemoglobin of 11.4.          Assessment and Plan:     ASSESSMENT:    1. Severe aortic stenosis status post TAVR with 23 mm S3 valve on 8/9/22.  Gradients have remained stable, LVEF 55-60 %. Tolerating activity without symptoms. On ASA 81 mg daily for antiplatelet therapy.   2. Hypertension.  Currently of anti-hypertensives, remains well-controlled.  3. Hyperlipidemia. LDL 85 on pravastatin 20 mg daily.   4. Chronic diastolic heart failure, NYHA class I. Appears compensated one exam, not on diuretic therapy.   5. Mildly dilated ascending aorta measuring 4.1 cm. Stable on most recent echo.   6. Paroxysmal SVT and PACs. Currently off BB, as noted above.   7. Obesity. S/p gastric bypass.   8. History of substance use disorder.  S/p multiple rehab admission, remains sober.       PLAN:     1. Continue aspirin 81 mg daily for antiplatelet therapy.  2. We discussed the need for antibiotic prophylaxis prior to any dental procedure.   3. Follow-up with general cardiologist, Dr. Ugarte, in about 6 months.          Ember Bush DNP, APRN, CNP  Page: [phone] (8a-5p M-F)    Orders this Visit:  Orders Placed This Encounter   Procedures     EKG 12-lead complete w/read - Clinics (performed today)     No orders of the defined types were placed in this encounter.    There are no discontinued medications.    Today's clinic visit entailed:  Review of the result(s) of each unique test - echo, EKG, labs  Ordering of each unique test  Prescription drug management  30 minutes spent on the date of the encounter doing chart review, review of test results, patient visit and documentation   Provider  Link to [facility] Help Grid     The level of medical decision making during this visit was of moderate complexity.           Review of Systems:     Review of Systems:  Skin:  not assessed     Eyes:  not assessed    ENT:  not assessed    Respiratory:  Positive for shortness of breath;dyspnea on exertion  Cardiovascular:    heaviness;Positive for  Gastroenterology: not assessed    Genitourinary:  not assessed    Musculoskeletal:  not assessed    Neurologic:  not assessed    Psychiatric:  not assessed    Heme/Lymph/Imm:  not assessed    Endocrine:  not assessed              Physical Exam:   Vitals: /68   Pulse 53   Ht 1.626 m (5'  "4\")   Wt 82.9 kg (182 lb 11.2 oz)   SpO2 96%   BMI 31.36 kg/m    Constitutional:  well developed        Skin:  warm and dry to the touch        Head:  normocephalic        Eyes:  pupils equal and round        ENT:  not assessed this visit        Neck:  JVP normal        Chest:  clear to auscultation;normal symmetry        Cardiac: regular rhythm;normal S1 and S2       systolic murmur;grade 1          Abdomen:  abdomen soft        Vascular: pulses full and equal                               right femoral bruit (-)      Extremities and Back:  no edema        Neurological:  no gross motor deficits;affect appropriate             Medications:     Current Outpatient Medications   Medication Sig Dispense Refill     acetaminophen (TYLENOL) 500 MG tablet Take 1,000 mg by mouth 2 times daily as needed for pain       aspirin 81 MG EC tablet Take 81 mg by mouth daily       bisacodyl (DULCOLAX) 5 MG EC tablet Take 5 mg by mouth daily as needed for constipation       folic acid (FOLVITE) 1 MG tablet Take 1 mg by mouth daily       gabapentin (NEURONTIN) 600 MG tablet Take 1 tablet (600 mg) by mouth At Bedtime 90 tablet 1     MISC NATURAL PRODUCTS PO Take 1 tablet by mouth daily *L-Tryptophan 1000 mg*       Multiple Vitamins-Minerals (CENTRUM SILVER) per tablet Take 1 tablet by mouth daily       polyethylene glycol (MIRALAX) 17 GM/Dose powder Take 17 g by mouth 2 times daily as needed for constipation       pravastatin (PRAVACHOL) 20 MG tablet Take 1 tablet (20 mg) by mouth daily 90 tablet 1     QUEtiapine (SEROQUEL XR) 150 MG TB24 24 hr tablet Take 2 tablets (300 mg) by mouth At Bedtime (Patient taking differently: Take 300 mg by mouth At Bedtime (2 x 150 mg = 300 mg; in addition to 50 mg nighttime dose)) 60 tablet 3     QUEtiapine (SEROQUEL) 50 MG tablet Take 50 mg by mouth daily (In addition to the 300 mg nighttime dose)       traZODone (DESYREL) 100 MG tablet TAKE 1 TABLET NIGHTLY AS NEEDED FOR SLEEP (Patient taking " differently: Take 100 mg by mouth At Bedtime) 30 tablet 3     valACYclovir (VALTREX) 1000 mg tablet Take 2,000 mg by mouth as needed (onset of cold sore)       MISC NATURAL PRODUCTS PO Take 1 capsule by mouth daily *B-glucan capsule* (Patient not taking: No sig reported)         Family History   Problem Relation Age of Onset     Substance Abuse Father      Cancer Father         throat and lung mets     Diabetes No family hx of      Coronary Artery Disease No family hx of      Cerebrovascular Disease No family hx of        Social History     Socioeconomic History     Marital status:      Spouse name: Mt     Number of children: 4     Years of education: 18     Highest education level: Not on file   Occupational History     Occupation: nurse     Employer: [employer]     Employer: RETIRED   Tobacco Use     Smoking status: Never Smoker     Smokeless tobacco: Never Used   Substance and Sexual Activity     Alcohol use: Not Currently     Alcohol/week: 63.0 standard drinks     Types: 63 Standard drinks or equivalent per week     Drug use: No     Sexual activity: Yes     Partners: Male   Other Topics Concern      Service Not Asked     Blood Transfusions No     Caffeine Concern Yes     Comment: 1-2 cups per day      Occupational Exposure Yes     Comment: blood     Hobby Hazards No     Sleep Concern Yes     Stress Concern Yes     Weight Concern Yes     Comment: gastric  byepass     Special Diet No     Back Care No     Exercise Yes     Comment: walk, swin     Bike Helmet No     Seat Belt Yes     Self-Exams Yes     Parent/sibling w/ CABG, MI or angioplasty before 65F 55M? Not Asked   Social History Narrative     Not on file     Social Determinants of Health     Financial Resource Strain: Not on file   Food Insecurity: Not on file   Transportation Needs: Not on file   Physical Activity: Not on file   Stress: Not on file   Social Connections: Not on file   Intimate Partner Violence: Not on file   Housing Stability:  Not on file            Past Medical History:     Past Medical History:   Diagnosis Date     Alcohol abuse      Anxiety disorder      Ascending aorta dilatation (H)      Bariatric surgery status 1996?    gastric bypass, [facility] and     Benign hypertension      Chronic insomnia      Chronic pain syndrome     Chronic back and neck pain, chronic pain due to osteoarthritis multiple joints     Coronary artery disease involving native coronary artery of native heart without angina pectoris 10/16/2018    Minimal coronary artery disease on coronary angiogram in 2015.      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     Major depressive disorder, single episode, severe, without mention of psychotic behavior      Mixed hyperlipidemia      Pelvic relaxation disorder     Surgical intervention for cystocele/rectocele 3,11/2012     Personal history of urinary calculi 06/2006    left ureteral stone,lithotripsy     Psoriasis      PVC (premature ventricular contraction)      Severe aortic stenosis      Spinal stenosis      Stage III chronic kidney disease (H) 2005     SVT (supraventricular tachycardia) (H)     likely atrial tachycardia              Past Surgical History:     Past Surgical History:   Procedure Laterality Date     APPENDECTOMY  3/2004    incidental     ARTHRODESIS TOE(S) Right 1/31/2020    Procedure: RIGHT FIRST METATARSAL PHALANGEAL JOINT ARTHRODESIS;  Surgeon: Steven Reyes MD;  Location: [facility] OR     C MEDIASTINOSCOPY W OR WO BIOPSY  2/2008    Videomediastinoscopy and, for mediastinal adenopathy -reactive lymphoid hyperplasia     CARPAL TUNNEL RELEASE RT/LT  10/2010    Carpometacarpal excisional arthroplasty with a fascial autograft and APL suspension sling  (07144). 2. Left thumb metacarpophalangeal joint fusion with autologous bone graft (97803). 3. Left endoscopic carpal tunnel release      CHOLECYSTECTOMY, LAPOROSCOPIC  11/2010    Cholecystectomy, Laparoscopic     COLONOSCOPY N/A 9/8/2016    Procedure: COMBINED COLONOSCOPY, SINGLE OR MULTIPLE BIOPSY/POLYPECTOMY BY BIOPSY;  Surgeon: Moe Barlow MD;  Location:  GI     CV CORONARY ANGIOGRAM N/A 6/20/2022    Procedure: Coronary Angiogram;  Surgeon: Nora Parker MD;  Location:  HEART CARDIAC CATH LAB     CV PCI N/A 6/20/2022    Procedure: Percutaneous Coronary Intervention;  Surgeon: Nora Parker MD;  Location:  HEART CARDIAC CATH LAB     CV RIGHT HEART CATH MEASUREMENTS RECORDED N/A 6/20/2022    Procedure: Right Heart Catheterization;  Surgeon: Nora Parker MD;  Location:  HEART CARDIAC CATH LAB     CV TRANSCATHETER AORTIC VALVE REPLACEMENT-FEMORAL APPROACH N/A 8/9/2022    Procedure: Transcatheter Aortic Valve Replacement-Femoral Approach;  Surgeon: Nora Parker MD;  Location: [facility] CARDIAC CATH LAB     CYSTOCELE REPAIR  11/2012    davinci laparoscopic sacrocolpopexy, enterocele repair, lysis of adhesions, placement of retropubic mid urethral sling, cystoscopy     CYSTOSCOPY, LITHOTRIPSY, COMBINED  6/2006    Left extracorporeal shock wave lithotripsy, cystoscopy, left ureteral stent placement.     CYSTOSCOPY, REMOVE STENT(S), COMBINED  7/2006    Cystoscopy, removal of left ureteral stent, retrograde pyelography, flexible and rigid ureteroscopy and holmium laser lithotripsy, basket removal of stone fragments, ureteral stent placement.      ENDOSCOPIC ULTRASOUND UPPER GASTROINTESTINAL TRACT (GI) N/A 6/12/2017    Procedure: ENDOSCOPIC ULTRASOUND, ESOPHAGOSCOPY / UPPER GASTROINTESTINAL TRACT (GI);  ENDOSCOPIC ULTRASOUND, ESOPHAGOSCOPY / UPPER GASTROINTESTINAL TRACT (GI);  Surgeon: Parth Graham MD;  Location:  GI     HERNIA REPAIR  4/2012    bilateral augmentation mastopexy,  ventral hernia repair, and medial thigh liposuction on 04/06/2012.      HYSTERECTOMY VAGINAL, BILATERAL SALPINGO-OOPHERECTOMY, COMBINED  1998    due to myoma and bleeding     JOINT REPLACEMENT, HIP RT/LT  4/2004    right total hip arthroplasty     LAPAROTOMY, LYSIS ADHESIONS, COMBINED  3/2004    lysis adhesions, ventral hernia repair, appendectomy incidentally     LYMPH NODE BIOPSY  4/2008    right axillary, reactive follicular and paracortical hyperplasia.     MAMMOPLASTY AUGMENTATION BILATERAL  4/2012     REPAIR HAMMER TOE Right 1/31/2020    Procedure: WITH SECOND AND THIRD CLAW TOE RECONSTRUCTION;  Surgeon: Steven Reyes MD;  Location: [facility] OR     REVISE RECONSTRUCTED BREAST  6/7/2012    Left breast capsulotomy.      ZZC GASTRIC BYPASS,OBESE<100CM ARIANNA-EN-Y  1996     ZZC REPAIR OF RECTOCELE  3/2012     ZZC TOTAL KNEE ARTHROPLASTY  12/2005    left               Allergies:   Bactrim [sulfamethoxazole w/trimethoprim], Codeine, and Morphine       Data:   All laboratory data reviewed:    Recent Labs   Lab Test 08/02/22  1640 03/24/22  0721 10/20/21  1251 03/03/21  1612 02/12/20  0534 06/26/19  1012 08/04/18  0900 02/05/18  1252   LDL  --   --  85 145* 218*   < >  --   --    HDL  --   --  86 140 81   < >  --   --    NHDL  --   --  132* 160* 236*   < >  --   --    CHOL  --   --  218* 300* 317*   < >  --   --    TRIG  --   --  234* 77 91   < >  --   --    TSH  --   --   --  1.05  --   --  3.05 1.40   NTBNP 1,188  --   --   --   --   --   --   --    IRON  --  40  --   --   --   --   --   --    FEB  --  274  --   --   --   --   --   --    IRONSAT  --  15  --   --   --   --   --   --    CHUCK  --  29  --  16  --    < >  --   --     < > = values in this interval not displayed.       Lab Results   Component Value Date    WBC 6.2 09/21/2022    WBC 5.6 03/27/2021    RBC 3.91 09/21/2022    RBC 3.64 (L) 03/27/2021    HGB 11.4 (L) 09/21/2022    HGB 10.7 (L) 03/27/2021    HCT 37.1 09/21/2022    HCT 33.4 (L) 03/27/2021    MCV 95  09/21/2022    MCV 92 03/27/2021    MCH 29.2 09/21/2022    MCH 29.4 03/27/2021    MCHC 30.7 (L) 09/21/2022    MCHC 32.0 03/27/2021    RDW 15.2 (H) 09/21/2022    RDW 15.7 (H) 03/27/2021     09/21/2022     03/27/2021       Lab Results   Component Value Date     09/21/2022     05/12/2021    POTASSIUM 4.3 09/21/2022    POTASSIUM 4.0 05/12/2021    CHLORIDE 101 09/21/2022    CHLORIDE 107 05/12/2021    CO2 30 09/21/2022    CO2 30 05/12/2021    ANIONGAP 6 09/21/2022    ANIONGAP 3 05/12/2021     (H) 09/21/2022     (H) 05/12/2021    BUN 18 09/21/2022    BUN 15 05/12/2021    CR 1.13 (H) 09/21/2022    CR 1.00 05/12/2021    GFRESTIMATED 50 (L) 09/21/2022    GFRESTIMATED 54 (L) 05/12/2021    GFRESTBLACK 63 05/12/2021    BIRGIT 9.5 09/21/2022    BIRGIT 9.4 05/12/2021      Lab Results   Component Value Date    AST 41 03/10/2022    AST 20 03/30/2021    ALT 23 03/10/2022    ALT 16 03/30/2021       Lab Results   Component Value Date    A1C 5.7 09/29/2010       Lab Results   Component Value Date    INR 0.95 08/02/2022    INR 0.98 06/20/2022    INR 0.9 03/30/2021    INR 1.03 06/16/2017

## 2022-09-21 NOTE — LETTER
9/21/2022    Nayeli Castorena PA-C  [address] [facility] [address]    RE: Kavitha Headley       Dear Colleague,     I had the pleasure of seeing Kavitha Headley in the [facility].  Cardiology Clinic Progress Note  Kavitha Headley MRN# [id number]   YOB: 1945 Age: 76 year old     Primary cardiologist: Dr. Ugarte     Reason for visit: 1-month TAVR follow-up     History of presenting illness:    Kavitha Headley is a pleasant 76 year old patient with past medical history significant for mildly dilated ascending aorta measuring 4.1 cm, paroxysmal SVT and PACs, hypertension, hyperlipidemia, obesity s/p gastric bypass surgery, chronic diastolic heart failure, substance use disorder, and severe aortic stenosis s/p TAVR with 23 mm Payne madyson 3 valve on 8/9/2022. She is here today for her 1-month follow-up.     In regards to her TAVR, the procedure was uncomplicated without any conduction or hemodynamic instability.  Postprocedure echocardiogram showed well-seated bioprosthetic valve with normal gradient, no paravalvular leak, and LVEF 60-65%. Her BB was held due to progression of 1st degree AVB. She is on aspirin 81 mg daily for antiplatelet therapy.    Today she reports doing well from a cardiovascular standpoint.  She denies any chest pain, shortness of breath, syncope or near syncope, or palpitations.  No PND orthopnea.  She has decided not to attend cardiac rehab, but walks about half a mile daily without any symptoms.    Her blood pressure is well controlled.  EKG today shows underlying first-degree AV block.     Echocardiogram today shows well-seated bioprosthetic valve with a mean gradient of 14 mmHg, trivial anterior pericardial effusion, mildly dilated ascending aorta measuring 4.2 cm, and LVEF of 55 to 60%.    Labs today show creatinine of 1.1, GFR 50, normal electrolytes.  Her CBC shows hemoglobin of 11.4.          Assessment and Plan:     ASSESSMENT:    1. Severe  aortic stenosis status post TAVR with 23 mm S3 valve on 8/9/22.  Gradients have remained stable, LVEF 55-60 %. Tolerating activity without symptoms. On ASA 81 mg daily for antiplatelet therapy.   2. Hypertension. Currently of anti-hypertensives, remains well-controlled.  3. Hyperlipidemia. LDL 85 on pravastatin 20 mg daily.   4. Chronic diastolic heart failure, NYHA class I. Appears compensated one exam, not on diuretic therapy.   5. Mildly dilated ascending aorta measuring 4.1 cm. Stable on most recent echo.   6. Paroxysmal SVT and PACs. Currently off BB, as noted above.   7. Obesity. S/p gastric bypass.   8. History of substance use disorder.  S/p multiple rehab admission, remains sober.       PLAN:     1. Continue aspirin 81 mg daily for antiplatelet therapy.  2. We discussed the need for antibiotic prophylaxis prior to any dental procedure.   3. Follow-up with general cardiologist, Dr. Ugarte, in about 6 months.          Ember Bush, RAFAEL, APRN, CNP  Page: [phone] (8a-5p M-F)    Orders this Visit:  Orders Placed This Encounter   Procedures     EKG 12-lead complete w/read - Clinics (performed today)     No orders of the defined types were placed in this encounter.    There are no discontinued medications.    Today's clinic visit entailed:  Review of the result(s) of each unique test - echo, EKG, labs  Ordering of each unique test  Prescription drug management  30 minutes spent on the date of the encounter doing chart review, review of test results, patient visit and documentation   Provider  Link to [facility] Help Grid     The level of medical decision making during this visit was of moderate complexity.           Review of Systems:     Review of Systems:  Skin:  not assessed     Eyes:  not assessed    ENT:  not assessed    Respiratory:  Positive for shortness of breath;dyspnea on exertion  Cardiovascular:    heaviness;Positive for  Gastroenterology: not assessed    Genitourinary:  not assessed   "  Musculoskeletal:  not assessed    Neurologic:  not assessed    Psychiatric:  not assessed    Heme/Lymph/Imm:  not assessed    Endocrine:  not assessed              Physical Exam:   Vitals: /68   Pulse 53   Ht 1.626 m (5' 4\")   Wt 82.9 kg (182 lb 11.2 oz)   SpO2 96%   BMI 31.36 kg/m    Constitutional:  well developed        Skin:  warm and dry to the touch        Head:  normocephalic        Eyes:  pupils equal and round        ENT:  not assessed this visit        Neck:  JVP normal        Chest:  clear to auscultation;normal symmetry        Cardiac: regular rhythm;normal S1 and S2       systolic murmur;grade 1          Abdomen:  abdomen soft        Vascular: pulses full and equal                               right femoral bruit (-)      Extremities and Back:  no edema        Neurological:  no gross motor deficits;affect appropriate             Medications:     Current Outpatient Medications   Medication Sig Dispense Refill     acetaminophen (TYLENOL) 500 MG tablet Take 1,000 mg by mouth 2 times daily as needed for pain       aspirin 81 MG EC tablet Take 81 mg by mouth daily       bisacodyl (DULCOLAX) 5 MG EC tablet Take 5 mg by mouth daily as needed for constipation       folic acid (FOLVITE) 1 MG tablet Take 1 mg by mouth daily       gabapentin (NEURONTIN) 600 MG tablet Take 1 tablet (600 mg) by mouth At Bedtime 90 tablet 1     MISC NATURAL PRODUCTS PO Take 1 tablet by mouth daily *L-Tryptophan 1000 mg*       Multiple Vitamins-Minerals (CENTRUM SILVER) per tablet Take 1 tablet by mouth daily       polyethylene glycol (MIRALAX) 17 GM/Dose powder Take 17 g by mouth 2 times daily as needed for constipation       pravastatin (PRAVACHOL) 20 MG tablet Take 1 tablet (20 mg) by mouth daily 90 tablet 1     QUEtiapine (SEROQUEL XR) 150 MG TB24 24 hr tablet Take 2 tablets (300 mg) by mouth At Bedtime (Patient taking differently: Take 300 mg by mouth At Bedtime (2 x 150 mg = 300 mg; in addition to 50 mg nighttime " dose)) 60 tablet 3     QUEtiapine (SEROQUEL) 50 MG tablet Take 50 mg by mouth daily (In addition to the 300 mg nighttime dose)       traZODone (DESYREL) 100 MG tablet TAKE 1 TABLET NIGHTLY AS NEEDED FOR SLEEP (Patient taking differently: Take 100 mg by mouth At Bedtime) 30 tablet 3     valACYclovir (VALTREX) 1000 mg tablet Take 2,000 mg by mouth as needed (onset of cold sore)       MISC NATURAL PRODUCTS PO Take 1 capsule by mouth daily *B-glucan capsule* (Patient not taking: No sig reported)         Family History   Problem Relation Age of Onset     Substance Abuse Father      Cancer Father         throat and lung mets     Diabetes No family hx of      Coronary Artery Disease No family hx of      Cerebrovascular Disease No family hx of        Social History     Socioeconomic History     Marital status:      Spouse name: Mt     Number of children: 4     Years of education: 18     Highest education level: Not on file   Occupational History     Occupation: nurse     Employer: [employer]     Employer: RETIRED   Tobacco Use     Smoking status: Never Smoker     Smokeless tobacco: Never Used   Substance and Sexual Activity     Alcohol use: Not Currently     Alcohol/week: 63.0 standard drinks     Types: 63 Standard drinks or equivalent per week     Drug use: No     Sexual activity: Yes     Partners: Male   Other Topics Concern      Service Not Asked     Blood Transfusions No     Caffeine Concern Yes     Comment: 1-2 cups per day      Occupational Exposure Yes     Comment: blood     Hobby Hazards No     Sleep Concern Yes     Stress Concern Yes     Weight Concern Yes     Comment: gastric  byepass     Special Diet No     Back Care No     Exercise Yes     Comment: walk, swin     Bike Helmet No     Seat Belt Yes     Self-Exams Yes     Parent/sibling w/ CABG, MI or angioplasty before 65F 55M? Not Asked   Social History Narrative     Not on file     Social Determinants of Health     Financial Resource Strain: Not on  file   Food Insecurity: Not on file   Transportation Needs: Not on file   Physical Activity: Not on file   Stress: Not on file   Social Connections: Not on file   Intimate Partner Violence: Not on file   Housing Stability: Not on file            Past Medical History:     Past Medical History:   Diagnosis Date     Alcohol abuse      Anxiety disorder      Ascending aorta dilatation (H)      Bariatric surgery status 1996?    gastric bypass, [employer] [facility] [employer] and     Benign hypertension      Chronic insomnia      Chronic pain syndrome     Chronic back and neck pain, chronic pain due to osteoarthritis multiple joints     Coronary artery disease involving native coronary artery of native heart without angina pectoris 10/16/2018    Minimal coronary artery disease on coronary angiogram in 2015.      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     Major depressive disorder, single episode, severe, without mention of psychotic behavior      Mixed hyperlipidemia      Pelvic relaxation disorder     Surgical intervention for cystocele/rectocele 3,11/2012     Personal history of urinary calculi 06/2006    left ureteral stone,lithotripsy     Psoriasis      PVC (premature ventricular contraction)      Severe aortic stenosis      Spinal stenosis      Stage III chronic kidney disease (H) 2005     SVT (supraventricular tachycardia) (H)     likely atrial tachycardia              Past Surgical History:     Past Surgical History:   Procedure Laterality Date     APPENDECTOMY  3/2004    incidental     ARTHRODESIS TOE(S) Right 1/31/2020    Procedure: RIGHT FIRST METATARSAL PHALANGEAL JOINT ARTHRODESIS;  Surgeon: Steven Reyes MD;  Location: [facility] OR     C MEDIASTINOSCOPY W OR WO BIOPSY  2/2008     Videomediastinoscopy and, for mediastinal adenopathy -reactive lymphoid hyperplasia     CARPAL TUNNEL RELEASE RT/LT  10/2010    Carpometacarpal excisional arthroplasty with a fascial autograft and APL suspension sling (58807). 2. Left thumb metacarpophalangeal joint fusion with autologous bone graft (21920). 3. Left endoscopic carpal tunnel release      CHOLECYSTECTOMY, LAPOROSCOPIC  11/2010    Cholecystectomy, Laparoscopic     COLONOSCOPY N/A 9/8/2016    Procedure: COMBINED COLONOSCOPY, SINGLE OR MULTIPLE BIOPSY/POLYPECTOMY BY BIOPSY;  Surgeon: Moe Barlow MD;  Location:  GI     CV CORONARY ANGIOGRAM N/A 6/20/2022    Procedure: Coronary Angiogram;  Surgeon: Nora Parker MD;  Location:  HEART CARDIAC CATH LAB     CV PCI N/A 6/20/2022    Procedure: Percutaneous Coronary Intervention;  Surgeon: Nora Parker MD;  Location:  HEART CARDIAC CATH LAB     CV RIGHT HEART CATH MEASUREMENTS RECORDED N/A 6/20/2022    Procedure: Right Heart Catheterization;  Surgeon: Nora Parker MD;  Location:  HEART CARDIAC CATH LAB     CV TRANSCATHETER AORTIC VALVE REPLACEMENT-FEMORAL APPROACH N/A 8/9/2022    Procedure: Transcatheter Aortic Valve Replacement-Femoral Approach;  Surgeon: Nora Parker MD;  Location:  HEART CARDIAC CATH LAB     CYSTOCELE REPAIR  11/2012    davinci laparoscopic sacrocolpopexy, enterocele repair, lysis of adhesions, placement of retropubic mid urethral sling, cystoscopy     CYSTOSCOPY, LITHOTRIPSY, COMBINED  6/2006    Left extracorporeal shock wave lithotripsy, cystoscopy, left ureteral stent placement.     CYSTOSCOPY, REMOVE STENT(S), COMBINED  7/2006    Cystoscopy, removal of left ureteral stent, retrograde pyelography, flexible and rigid ureteroscopy and holmium laser lithotripsy, basket removal of stone fragments, ureteral stent placement.      ENDOSCOPIC ULTRASOUND UPPER GASTROINTESTINAL TRACT (GI) N/A 6/12/2017    Procedure: ENDOSCOPIC ULTRASOUND, ESOPHAGOSCOPY / UPPER  GASTROINTESTINAL TRACT (GI);  ENDOSCOPIC ULTRASOUND, ESOPHAGOSCOPY / UPPER GASTROINTESTINAL TRACT (GI);  Surgeon: Parth Graham MD;  Location:  GI     HERNIA REPAIR  4/2012    bilateral augmentation mastopexy, ventral hernia repair, and medial thigh liposuction on 04/06/2012.      HYSTERECTOMY VAGINAL, BILATERAL SALPINGO-OOPHERECTOMY, COMBINED  1998    due to myoma and bleeding     JOINT REPLACEMENT, HIP RT/LT  4/2004    right total hip arthroplasty     LAPAROTOMY, LYSIS ADHESIONS, COMBINED  3/2004    lysis adhesions, ventral hernia repair, appendectomy incidentally     LYMPH NODE BIOPSY  4/2008    right axillary, reactive follicular and paracortical hyperplasia.     MAMMOPLASTY AUGMENTATION BILATERAL  4/2012     REPAIR HAMMER TOE Right 1/31/2020    Procedure: WITH SECOND AND THIRD CLAW TOE RECONSTRUCTION;  Surgeon: Steven Reyes MD;  Location:  OR     REVISE RECONSTRUCTED BREAST  6/7/2012    Left breast capsulotomy.      ZZC GASTRIC BYPASS,OBESE<100CM ARIANNA-EN-Y  1996     ZZC REPAIR OF RECTOCELE  3/2012     ZZC TOTAL KNEE ARTHROPLASTY  12/2005    left               Allergies:   Bactrim [sulfamethoxazole w/trimethoprim], Codeine, and Morphine       Data:   All laboratory data reviewed:    Recent Labs   Lab Test 08/02/22  1640 03/24/22  0721 10/20/21  1251 03/03/21  1612 02/12/20  0534 06/26/19  1012 08/04/18  0900 02/05/18  1252   LDL  --   --  85 145* 218*   < >  --   --    HDL  --   --  86 140 81   < >  --   --    NHDL  --   --  132* 160* 236*   < >  --   --    CHOL  --   --  218* 300* 317*   < >  --   --    TRIG  --   --  234* 77 91   < >  --   --    TSH  --   --   --  1.05  --   --  3.05 1.40   NTBNP 1,188  --   --   --   --   --   --   --    IRON  --  40  --   --   --   --   --   --    FEB  --  274  --   --   --   --   --   --    IRONSAT  --  15  --   --   --   --   --   --    CHUCK  --  29  --  16  --    < >  --   --     < > = values in this interval not displayed.       Lab Results   Component  Value Date    WBC 6.2 09/21/2022    WBC 5.6 03/27/2021    RBC 3.91 09/21/2022    RBC 3.64 (L) 03/27/2021    HGB 11.4 (L) 09/21/2022    HGB 10.7 (L) 03/27/2021    HCT 37.1 09/21/2022    HCT 33.4 (L) 03/27/2021    MCV 95 09/21/2022    MCV 92 03/27/2021    MCH 29.2 09/21/2022    MCH 29.4 03/27/2021    MCHC 30.7 (L) 09/21/2022    MCHC 32.0 03/27/2021    RDW 15.2 (H) 09/21/2022    RDW 15.7 (H) 03/27/2021     09/21/2022     03/27/2021       Lab Results   Component Value Date     09/21/2022     05/12/2021    POTASSIUM 4.3 09/21/2022    POTASSIUM 4.0 05/12/2021    CHLORIDE 101 09/21/2022    CHLORIDE 107 05/12/2021    CO2 30 09/21/2022    CO2 30 05/12/2021    ANIONGAP 6 09/21/2022    ANIONGAP 3 05/12/2021     (H) 09/21/2022     (H) 05/12/2021    BUN 18 09/21/2022    BUN 15 05/12/2021    CR 1.13 (H) 09/21/2022    CR 1.00 05/12/2021    GFRESTIMATED 50 (L) 09/21/2022    GFRESTIMATED 54 (L) 05/12/2021    GFRESTBLACK 63 05/12/2021    BIRGIT 9.5 09/21/2022    BIRGIT 9.4 05/12/2021      Lab Results   Component Value Date    AST 41 03/10/2022    AST 20 03/30/2021    ALT 23 03/10/2022    ALT 16 03/30/2021       Lab Results   Component Value Date    A1C 5.7 09/29/2010       Lab Results   Component Value Date    INR 0.95 08/02/2022    INR 0.98 06/20/2022    INR 0.9 03/30/2021    INR 1.03 06/16/2017       Thank you for allowing me to participate in the care of your patient.      Sincerely,     Ember Bush, [facility]  cc:   Nora Parker MD  [address] [id number]

## 2022-09-21 NOTE — PATIENT INSTRUCTIONS
Today's Plan:     1) Follow-up with Dr. Ugarte in about 6 months.     Alie RN ([phone]), Caterina RN ([phone]), and Rachel DILLON ([phone])    Scheduling phone number: [phone]    Reminder: Please bring in all current medications, over the counter supplements and vitamin bottles to your next appointment.-    It was a pleasure seeing you today!     Ember Bush, ADITHYA  9/21/2022    -

## 2022-09-22 ENCOUNTER — CARE COORDINATION (OUTPATIENT)
Dept: CARDIOLOGY | Facility: CLINIC | Age: 77
End: 2022-09-22

## 2022-09-22 NOTE — PROGRESS NOTES
Called to review echo results. Linda verbalized understanding and agreed. She says she feels great.     Post-TAVR KCCQ:   1a. 5  1b. 5  1c. 4  2. 5  3. 6  4. 7  5. 5  6. 5  7. 5  8a. 5  8b. 5  8c. 5    Caterina Gamboa, RN  Structural Heart Coordinator  [facility]

## 2022-10-24 ENCOUNTER — TELEPHONE (OUTPATIENT)
Dept: ADDICTION MEDICINE | Facility: CLINIC | Age: 77
End: 2022-10-24

## 2023-01-01 ENCOUNTER — PATIENT OUTREACH (OUTPATIENT)
Dept: CARE COORDINATION | Facility: CLINIC | Age: 78
End: 2023-01-01
Payer: COMMERCIAL

## 2023-01-01 ENCOUNTER — LAB (OUTPATIENT)
Dept: LAB | Facility: CLINIC | Age: 78
End: 2023-01-01
Payer: COMMERCIAL

## 2023-01-01 ENCOUNTER — VIRTUAL VISIT (OUTPATIENT)
Dept: FAMILY MEDICINE | Facility: CLINIC | Age: 78
End: 2023-01-01
Payer: COMMERCIAL

## 2023-01-01 ENCOUNTER — HOSPITAL ENCOUNTER (OUTPATIENT)
Dept: CT IMAGING | Facility: CLINIC | Age: 78
Discharge: HOME | End: 2023-12-06
Attending: INTERNAL MEDICINE
Payer: COMMERCIAL

## 2023-01-01 ENCOUNTER — TELEPHONE (OUTPATIENT)
Dept: FAMILY MEDICINE | Facility: CLINIC | Age: 78
End: 2023-01-01
Payer: COMMERCIAL

## 2023-01-01 ENCOUNTER — APPOINTMENT (OUTPATIENT)
Dept: CT IMAGING | Facility: CLINIC | Age: 78
End: 2023-01-01
Attending: EMERGENCY MEDICINE
Payer: COMMERCIAL

## 2023-01-01 ENCOUNTER — HOSPITAL ENCOUNTER (OUTPATIENT)
Dept: CARDIAC REHAB | Facility: CLINIC | Age: 78
Discharge: HOME | End: 2023-12-05
Attending: INTERNAL MEDICINE
Payer: COMMERCIAL

## 2023-01-01 ENCOUNTER — TRANSFERRED RECORDS (OUTPATIENT)
Dept: HEALTH INFORMATION MANAGEMENT | Facility: CLINIC | Age: 78
End: 2023-01-01

## 2023-01-01 ENCOUNTER — VIRTUAL VISIT (OUTPATIENT)
Dept: ADDICTION MEDICINE | Facility: CLINIC | Age: 78
End: 2023-01-01
Payer: COMMERCIAL

## 2023-01-01 ENCOUNTER — HEALTH MAINTENANCE LETTER (OUTPATIENT)
Age: 78
End: 2023-01-01

## 2023-01-01 ENCOUNTER — OFFICE VISIT (OUTPATIENT)
Dept: OTHER | Facility: CLINIC | Age: 78
End: 2023-01-01
Attending: INTERNAL MEDICINE
Payer: COMMERCIAL

## 2023-01-01 ENCOUNTER — TELEPHONE (OUTPATIENT)
Dept: FAMILY MEDICINE | Facility: CLINIC | Age: 78
End: 2023-01-01

## 2023-01-01 ENCOUNTER — APPOINTMENT (OUTPATIENT)
Dept: LAB | Facility: CLINIC | Age: 78
End: 2023-01-01
Payer: COMMERCIAL

## 2023-01-01 ENCOUNTER — TELEPHONE (OUTPATIENT)
Dept: OTHER | Facility: CLINIC | Age: 78
End: 2023-01-01
Payer: COMMERCIAL

## 2023-01-01 ENCOUNTER — NURSE TRIAGE (OUTPATIENT)
Dept: NURSING | Facility: CLINIC | Age: 78
End: 2023-01-01
Payer: COMMERCIAL

## 2023-01-01 ENCOUNTER — TELEPHONE (OUTPATIENT)
Dept: CARDIOLOGY | Facility: CLINIC | Age: 78
End: 2023-01-01
Payer: COMMERCIAL

## 2023-01-01 ENCOUNTER — OFFICE VISIT (OUTPATIENT)
Dept: CARDIOLOGY | Facility: CLINIC | Age: 78
End: 2023-01-01
Payer: COMMERCIAL

## 2023-01-01 ENCOUNTER — TELEPHONE (OUTPATIENT)
Dept: ADDICTION MEDICINE | Facility: CLINIC | Age: 78
End: 2023-01-01
Payer: COMMERCIAL

## 2023-01-01 ENCOUNTER — ANCILLARY PROCEDURE (OUTPATIENT)
Dept: CT IMAGING | Facility: CLINIC | Age: 78
End: 2023-01-01
Attending: INTERNAL MEDICINE
Payer: COMMERCIAL

## 2023-01-01 ENCOUNTER — OFFICE VISIT (OUTPATIENT)
Dept: FAMILY MEDICINE | Facility: CLINIC | Age: 78
End: 2023-01-01
Payer: COMMERCIAL

## 2023-01-01 ENCOUNTER — TRANSFERRED RECORDS (OUTPATIENT)
Dept: HEALTH INFORMATION MANAGEMENT | Facility: CLINIC | Age: 78
End: 2023-01-01
Payer: COMMERCIAL

## 2023-01-01 ENCOUNTER — NURSE TRIAGE (OUTPATIENT)
Dept: FAMILY MEDICINE | Facility: CLINIC | Age: 78
End: 2023-01-01
Payer: COMMERCIAL

## 2023-01-01 ENCOUNTER — ANCILLARY PROCEDURE (OUTPATIENT)
Dept: ULTRASOUND IMAGING | Facility: CLINIC | Age: 78
End: 2023-01-01
Attending: INTERNAL MEDICINE
Payer: COMMERCIAL

## 2023-01-01 ENCOUNTER — HOSPITAL ENCOUNTER (EMERGENCY)
Facility: CLINIC | Age: 78
Discharge: HOME | End: 2023-11-06
Attending: EMERGENCY MEDICINE | Admitting: EMERGENCY MEDICINE
Payer: COMMERCIAL

## 2023-01-01 ENCOUNTER — PATIENT OUTREACH (OUTPATIENT)
Dept: CARE COORDINATION | Facility: CLINIC | Age: 78
End: 2023-01-01

## 2023-01-01 ENCOUNTER — NURSE TRIAGE (OUTPATIENT)
Dept: FAMILY MEDICINE | Facility: CLINIC | Age: 78
End: 2023-01-01

## 2023-01-01 ENCOUNTER — HOSPITAL ENCOUNTER (OUTPATIENT)
Dept: ULTRASOUND IMAGING | Facility: CLINIC | Age: 78
Discharge: HOME | End: 2023-12-06
Attending: INTERNAL MEDICINE
Payer: COMMERCIAL

## 2023-01-01 ENCOUNTER — HOSPITAL ENCOUNTER (OUTPATIENT)
Dept: CARDIOLOGY | Facility: CLINIC | Age: 78
Discharge: HOME | End: 2023-08-31
Admitting: INTERNAL MEDICINE
Payer: COMMERCIAL

## 2023-01-01 VITALS
OXYGEN SATURATION: 97 % | DIASTOLIC BLOOD PRESSURE: 90 MMHG | BODY MASS INDEX: 31.41 KG/M2 | WEIGHT: 184 LBS | HEART RATE: 92 BPM | SYSTOLIC BLOOD PRESSURE: 152 MMHG | HEIGHT: 64 IN

## 2023-01-01 VITALS
HEART RATE: 81 BPM | BODY MASS INDEX: 32.94 KG/M2 | RESPIRATION RATE: 22 BRPM | DIASTOLIC BLOOD PRESSURE: 78 MMHG | SYSTOLIC BLOOD PRESSURE: 174 MMHG | WEIGHT: 179 LBS | OXYGEN SATURATION: 95 % | TEMPERATURE: 97.3 F | HEIGHT: 62 IN

## 2023-01-01 VITALS
DIASTOLIC BLOOD PRESSURE: 70 MMHG | BODY MASS INDEX: 33.73 KG/M2 | HEART RATE: 94 BPM | OXYGEN SATURATION: 97 % | WEIGHT: 184.4 LBS | SYSTOLIC BLOOD PRESSURE: 147 MMHG

## 2023-01-01 VITALS
OXYGEN SATURATION: 98 % | BODY MASS INDEX: 32.31 KG/M2 | SYSTOLIC BLOOD PRESSURE: 130 MMHG | WEIGHT: 175.6 LBS | HEIGHT: 62 IN | HEART RATE: 70 BPM | DIASTOLIC BLOOD PRESSURE: 83 MMHG

## 2023-01-01 VITALS
BODY MASS INDEX: 34.76 KG/M2 | HEIGHT: 62 IN | WEIGHT: 188.9 LBS | HEART RATE: 76 BPM | DIASTOLIC BLOOD PRESSURE: 81 MMHG | RESPIRATION RATE: 16 BRPM | OXYGEN SATURATION: 99 % | TEMPERATURE: 98.7 F | SYSTOLIC BLOOD PRESSURE: 137 MMHG

## 2023-01-01 DIAGNOSIS — F33.2 SEVERE EPISODE OF RECURRENT MAJOR DEPRESSIVE DISORDER, WITHOUT PSYCHOTIC FEATURES (H): ICD-10-CM

## 2023-01-01 DIAGNOSIS — F10.21 ALCOHOL DEPENDENCE IN REMISSION (H): ICD-10-CM

## 2023-01-01 DIAGNOSIS — I10 BENIGN ESSENTIAL HYPERTENSION: ICD-10-CM

## 2023-01-01 DIAGNOSIS — J02.9 SORE THROAT: ICD-10-CM

## 2023-01-01 DIAGNOSIS — R09.89 ABNORMAL PERIPHERAL PULSE: ICD-10-CM

## 2023-01-01 DIAGNOSIS — E78.5 HYPERLIPIDEMIA LDL GOAL <70: ICD-10-CM

## 2023-01-01 DIAGNOSIS — K70.9 LIVER DISEASE, CHRONIC, DUE TO ALCOHOL (H): ICD-10-CM

## 2023-01-01 DIAGNOSIS — F32.0 MILD MAJOR DEPRESSION (H): Primary | ICD-10-CM

## 2023-01-01 DIAGNOSIS — G89.4 CHRONIC PAIN SYNDROME: ICD-10-CM

## 2023-01-01 DIAGNOSIS — F10.21 SEVERE ALCOHOL USE DISORDER, IN SUSTAINED REMISSION (H): Primary | ICD-10-CM

## 2023-01-01 DIAGNOSIS — F33.0 MILD RECURRENT MAJOR DEPRESSION (H): ICD-10-CM

## 2023-01-01 DIAGNOSIS — Z95.2 HISTORY OF AORTIC VALVE REPLACEMENT: ICD-10-CM

## 2023-01-01 DIAGNOSIS — N18.31 STAGE 3A CHRONIC KIDNEY DISEASE (H): ICD-10-CM

## 2023-01-01 DIAGNOSIS — I28.8 ENLARGED PULMONARY ARTERY (H): ICD-10-CM

## 2023-01-01 DIAGNOSIS — I77.810 MILD ASCENDING AORTA DILATATION (H): ICD-10-CM

## 2023-01-01 DIAGNOSIS — F10.21 ALCOHOL USE DISORDER, SEVERE, IN EARLY REMISSION (H): ICD-10-CM

## 2023-01-01 DIAGNOSIS — F51.04 CHRONIC INSOMNIA: ICD-10-CM

## 2023-01-01 DIAGNOSIS — I35.0 SEVERE AORTIC STENOSIS: ICD-10-CM

## 2023-01-01 DIAGNOSIS — E78.5 HYPERLIPIDEMIA LDL GOAL <100: ICD-10-CM

## 2023-01-01 DIAGNOSIS — I71.21 ANEURYSM OF ASCENDING AORTA WITHOUT RUPTURE (H): Primary | ICD-10-CM

## 2023-01-01 DIAGNOSIS — F32.0 MILD MAJOR DEPRESSION (H): ICD-10-CM

## 2023-01-01 DIAGNOSIS — M48.00 SPINAL STENOSIS, UNSPECIFIED SPINAL REGION: ICD-10-CM

## 2023-01-01 DIAGNOSIS — K70.30 ALCOHOLIC CIRRHOSIS OF LIVER WITHOUT ASCITES (H): ICD-10-CM

## 2023-01-01 DIAGNOSIS — Z23 NEED FOR PROPHYLACTIC VACCINATION AND INOCULATION AGAINST INFLUENZA: ICD-10-CM

## 2023-01-01 DIAGNOSIS — R05.1 ACUTE COUGH: ICD-10-CM

## 2023-01-01 DIAGNOSIS — B00.1 COLD SORE: ICD-10-CM

## 2023-01-01 DIAGNOSIS — Z95.3 S/P TAVR (TRANSCATHETER AORTIC VALVE REPLACEMENT), BIOPROSTHETIC: ICD-10-CM

## 2023-01-01 DIAGNOSIS — R06.02 SHORTNESS OF BREATH: ICD-10-CM

## 2023-01-01 DIAGNOSIS — E66.811 OBESITY, CLASS I, BMI 30.0-34.9 (SEE ACTUAL BMI): ICD-10-CM

## 2023-01-01 DIAGNOSIS — K70.30 ALCOHOLIC CIRRHOSIS OF LIVER WITHOUT ASCITES (H): Primary | ICD-10-CM

## 2023-01-01 DIAGNOSIS — F10.20 ALCOHOL USE DISORDER, SEVERE, DEPENDENCE (H): Primary | ICD-10-CM

## 2023-01-01 DIAGNOSIS — I10 BENIGN ESSENTIAL HYPERTENSION: Primary | ICD-10-CM

## 2023-01-01 DIAGNOSIS — I10 ESSENTIAL HYPERTENSION: ICD-10-CM

## 2023-01-01 DIAGNOSIS — D64.9 ANEMIA, UNSPECIFIED TYPE: ICD-10-CM

## 2023-01-01 DIAGNOSIS — I50.32 CHRONIC DIASTOLIC (CONGESTIVE) HEART FAILURE (H): ICD-10-CM

## 2023-01-01 DIAGNOSIS — R06.09 DYSPNEA ON EXERTION: ICD-10-CM

## 2023-01-01 DIAGNOSIS — Z98.84 BARIATRIC SURGERY STATUS: ICD-10-CM

## 2023-01-01 DIAGNOSIS — R09.89 LEFT CAROTID BRUIT: ICD-10-CM

## 2023-01-01 DIAGNOSIS — Z95.3 S/P TAVR (TRANSCATHETER AORTIC VALVE REPLACEMENT), BIOPROSTHETIC: Primary | ICD-10-CM

## 2023-01-01 DIAGNOSIS — U07.1 INFECTION DUE TO 2019 NOVEL CORONAVIRUS: Primary | ICD-10-CM

## 2023-01-01 DIAGNOSIS — E53.8 VITAMIN B12 DEFICIENCY (NON ANEMIC): ICD-10-CM

## 2023-01-01 DIAGNOSIS — K21.00 GASTROESOPHAGEAL REFLUX DISEASE WITH ESOPHAGITIS WITHOUT HEMORRHAGE: ICD-10-CM

## 2023-01-01 DIAGNOSIS — G47.33 OSA (OBSTRUCTIVE SLEEP APNEA): ICD-10-CM

## 2023-01-01 DIAGNOSIS — N18.30 STAGE 3 CHRONIC KIDNEY DISEASE, UNSPECIFIED WHETHER STAGE 3A OR 3B CKD (H): ICD-10-CM

## 2023-01-01 DIAGNOSIS — I25.10 CORONARY ARTERY DISEASE INVOLVING NATIVE CORONARY ARTERY OF NATIVE HEART WITHOUT ANGINA PECTORIS: ICD-10-CM

## 2023-01-01 DIAGNOSIS — I71.21 ANEURYSM OF ASCENDING AORTA WITHOUT RUPTURE (H): ICD-10-CM

## 2023-01-01 DIAGNOSIS — E66.01 MORBID (SEVERE) OBESITY DUE TO EXCESS CALORIES (H): ICD-10-CM

## 2023-01-01 DIAGNOSIS — Z95.2 S/P TAVR (TRANSCATHETER AORTIC VALVE REPLACEMENT): Primary | ICD-10-CM

## 2023-01-01 DIAGNOSIS — E66.811 OBESITY, CLASS I, BMI 30.0-34.9 (SEE ACTUAL BMI): Primary | ICD-10-CM

## 2023-01-01 DIAGNOSIS — M79.10 MYALGIA: ICD-10-CM

## 2023-01-01 DIAGNOSIS — J96.01 ACUTE RESPIRATORY FAILURE WITH HYPOXIA (H): ICD-10-CM

## 2023-01-01 DIAGNOSIS — I27.20 PULMONARY HYPERTENSION (H): ICD-10-CM

## 2023-01-01 DIAGNOSIS — R06.02 SHORTNESS OF BREATH: Primary | ICD-10-CM

## 2023-01-01 DIAGNOSIS — U07.1 CLINICAL DIAGNOSIS OF COVID-19: ICD-10-CM

## 2023-01-01 DIAGNOSIS — I77.810 MILD ASCENDING AORTA DILATATION (H): Primary | ICD-10-CM

## 2023-01-01 DIAGNOSIS — R06.09 EXERTIONAL DYSPNEA: ICD-10-CM

## 2023-01-01 DIAGNOSIS — Z95.2 S/P TAVR (TRANSCATHETER AORTIC VALVE REPLACEMENT): ICD-10-CM

## 2023-01-01 DIAGNOSIS — K74.00 LIVER FIBROSIS: ICD-10-CM

## 2023-01-01 LAB
ALBUMIN SERPL BCG-MCNC: 4.4 G/DL (ref 3.5–5.2)
ALBUMIN SERPL BCG-MCNC: 4.6 G/DL (ref 3.5–5.2)
ALP SERPL-CCNC: 65 U/L (ref 35–104)
ALP SERPL-CCNC: 75 U/L (ref 35–104)
ALT SERPL W P-5'-P-CCNC: 12 U/L (ref 0–50)
ALT SERPL W P-5'-P-CCNC: <5 U/L (ref 0–50)
ANION GAP SERPL CALCULATED.3IONS-SCNC: 11 MMOL/L (ref 7–15)
ANION GAP SERPL CALCULATED.3IONS-SCNC: 12 MMOL/L (ref 7–15)
ANION GAP SERPL CALCULATED.3IONS-SCNC: 12 MMOL/L (ref 7–15)
ANION GAP SERPL CALCULATED.3IONS-SCNC: 13 MMOL/L (ref 7–15)
ANION GAP SERPL CALCULATED.3IONS-SCNC: 16 MMOL/L (ref 7–15)
AST SERPL W P-5'-P-CCNC: 24 U/L (ref 0–45)
AST SERPL W P-5'-P-CCNC: 27 U/L (ref 0–45)
ATRIAL RATE - MUSE: 75 BPM
ATRIAL RATE - MUSE: 79 BPM
BASOPHILS # BLD AUTO: 0.1 10E3/UL (ref 0–0.2)
BASOPHILS NFR BLD AUTO: 1 %
BILIRUB SERPL-MCNC: 0.2 MG/DL
BILIRUB SERPL-MCNC: 0.2 MG/DL
BUN SERPL-MCNC: 17.1 MG/DL (ref 8–23)
BUN SERPL-MCNC: 18.1 MG/DL (ref 8–23)
BUN SERPL-MCNC: 19.9 MG/DL (ref 8–23)
BUN SERPL-MCNC: 6.8 MG/DL (ref 8–23)
BUN SERPL-MCNC: 8.5 MG/DL (ref 8–23)
CALCIUM SERPL-MCNC: 9.3 MG/DL (ref 8.8–10.2)
CALCIUM SERPL-MCNC: 9.4 MG/DL (ref 8.8–10.2)
CALCIUM SERPL-MCNC: 9.7 MG/DL (ref 8.8–10.2)
CHLORIDE SERPL-SCNC: 101 MMOL/L (ref 98–107)
CHLORIDE SERPL-SCNC: 88 MMOL/L (ref 98–107)
CHLORIDE SERPL-SCNC: 89 MMOL/L (ref 98–107)
CHLORIDE SERPL-SCNC: 93 MMOL/L (ref 98–107)
CHLORIDE SERPL-SCNC: 94 MMOL/L (ref 98–107)
CHOLEST SERPL-MCNC: 259 MG/DL
CREAT BLD-MCNC: 1.1 MG/DL (ref 0.5–1)
CREAT SERPL-MCNC: 0.96 MG/DL (ref 0.51–0.95)
CREAT SERPL-MCNC: 1.02 MG/DL (ref 0.51–0.95)
CREAT SERPL-MCNC: 1.05 MG/DL (ref 0.51–0.95)
CREAT SERPL-MCNC: 1.17 MG/DL (ref 0.51–0.95)
CREAT SERPL-MCNC: 1.17 MG/DL (ref 0.51–0.95)
CREAT UR-MCNC: 103 MG/DL
D DIMER PPP FEU-MCNC: 0.88 UG/ML FEU (ref 0–0.5)
DEPRECATED HCO3 PLAS-SCNC: 24 MMOL/L (ref 22–29)
DEPRECATED HCO3 PLAS-SCNC: 25 MMOL/L (ref 22–29)
DEPRECATED HCO3 PLAS-SCNC: 26 MMOL/L (ref 22–29)
DEPRECATED HCO3 PLAS-SCNC: 27 MMOL/L (ref 22–29)
DEPRECATED HCO3 PLAS-SCNC: 27 MMOL/L (ref 22–29)
DIASTOLIC BLOOD PRESSURE - MUSE: NORMAL MMHG
DIASTOLIC BLOOD PRESSURE - MUSE: NORMAL MMHG
EGFRCR SERPLBLD CKD-EPI 2021: 47 ML/MIN/1.73M2
EGFRCR SERPLBLD CKD-EPI 2021: 47 ML/MIN/1.73M2
EGFRCR SERPLBLD CKD-EPI 2021: 51 ML/MIN/1.73M2
EGFRCR SERPLBLD CKD-EPI 2021: 53 ML/MIN/1.73M2
EGFRCR SERPLBLD CKD-EPI 2021: 60 ML/MIN/1.73M2
EOSINOPHIL # BLD AUTO: 0.1 10E3/UL (ref 0–0.7)
EOSINOPHIL NFR BLD AUTO: 1 %
ERYTHROCYTE [DISTWIDTH] IN BLOOD BY AUTOMATED COUNT: 14.9 % (ref 10–15)
ERYTHROCYTE [DISTWIDTH] IN BLOOD BY AUTOMATED COUNT: 15.1 % (ref 10–15)
ERYTHROCYTE [DISTWIDTH] IN BLOOD BY AUTOMATED COUNT: 15.6 % (ref 10–15)
FERRITIN SERPL-MCNC: 28 NG/ML (ref 11–328)
FOLATE SERPL-MCNC: 9 NG/ML (ref 4.6–34.8)
GFR SERPL CREATININE-BSD FRML MDRD: 56 ML/MIN/1.73M2
GLUCOSE SERPL-MCNC: 101 MG/DL (ref 70–99)
GLUCOSE SERPL-MCNC: 106 MG/DL (ref 70–99)
GLUCOSE SERPL-MCNC: 114 MG/DL (ref 70–99)
GLUCOSE SERPL-MCNC: 82 MG/DL (ref 70–99)
GLUCOSE SERPL-MCNC: 98 MG/DL (ref 70–99)
HCT VFR BLD AUTO: 33.4 % (ref 35–47)
HCT VFR BLD AUTO: 33.6 % (ref 35–47)
HCT VFR BLD AUTO: 36.4 % (ref 35–47)
HDLC SERPL-MCNC: 98 MG/DL
HGB BLD-MCNC: 10.6 G/DL (ref 11.7–15.7)
HGB BLD-MCNC: 10.8 G/DL (ref 11.7–15.7)
HGB BLD-MCNC: 11.5 G/DL (ref 11.7–15.7)
IMM GRANULOCYTES # BLD: 0 10E3/UL
IMM GRANULOCYTES NFR BLD: 0 %
INTERPRETATION ECG - MUSE: NORMAL
INTERPRETATION ECG - MUSE: NORMAL
IRON BINDING CAPACITY (ROCHE): 413 UG/DL (ref 240–430)
IRON SATN MFR SERPL: 17 % (ref 15–46)
IRON SERPL-MCNC: 70 UG/DL (ref 37–145)
LDLC SERPL CALC-MCNC: 133 MG/DL
LVEF ECHO: NORMAL
LYMPHOCYTES # BLD AUTO: 2.1 10E3/UL (ref 0.8–5.3)
LYMPHOCYTES NFR BLD AUTO: 22 %
MAGNESIUM SERPL-MCNC: 1.7 MG/DL (ref 1.7–2.3)
MCH RBC QN AUTO: 29.6 PG (ref 26.5–33)
MCHC RBC AUTO-ENTMCNC: 31.5 G/DL (ref 31.5–36.5)
MCHC RBC AUTO-ENTMCNC: 31.6 G/DL (ref 31.5–36.5)
MCHC RBC AUTO-ENTMCNC: 32.3 G/DL (ref 31.5–36.5)
MCV RBC AUTO: 92 FL (ref 78–100)
MCV RBC AUTO: 94 FL (ref 78–100)
MCV RBC AUTO: 94 FL (ref 78–100)
MICROALBUMIN UR-MCNC: <12 MG/L
MICROALBUMIN/CREAT UR: NORMAL MG/G{CREAT}
MONOCYTES # BLD AUTO: 1.1 10E3/UL (ref 0–1.3)
MONOCYTES NFR BLD AUTO: 11 %
NEUTROPHILS # BLD AUTO: 6.4 10E3/UL (ref 1.6–8.3)
NEUTROPHILS NFR BLD AUTO: 65 %
NONHDLC SERPL-MCNC: 161 MG/DL
NRBC # BLD AUTO: 0 10E3/UL
NRBC BLD AUTO-RTO: 0 /100
NT-PROBNP SERPL-MCNC: 1010 PG/ML (ref 0–1800)
P AXIS - MUSE: 45 DEGREES
P AXIS - MUSE: 62 DEGREES
PLATELET # BLD AUTO: 285 10E3/UL (ref 150–450)
PLATELET # BLD AUTO: 320 10E3/UL (ref 150–450)
PLATELET # BLD AUTO: 327 10E3/UL (ref 150–450)
POTASSIUM SERPL-SCNC: 4.5 MMOL/L (ref 3.4–5.3)
POTASSIUM SERPL-SCNC: 4.6 MMOL/L (ref 3.4–5.3)
POTASSIUM SERPL-SCNC: 4.8 MMOL/L (ref 3.4–5.3)
POTASSIUM SERPL-SCNC: 5 MMOL/L (ref 3.4–5.3)
POTASSIUM SERPL-SCNC: 5.3 MMOL/L (ref 3.4–5.3)
PR INTERVAL - MUSE: 204 MS
PR INTERVAL - MUSE: 206 MS
PROT SERPL-MCNC: 7.3 G/DL (ref 6.4–8.3)
PROT SERPL-MCNC: 8 G/DL (ref 6.4–8.3)
QRS DURATION - MUSE: 114 MS
QRS DURATION - MUSE: 122 MS
QT - MUSE: 412 MS
QT - MUSE: 424 MS
QTC - MUSE: 472 MS
QTC - MUSE: 473 MS
R AXIS - MUSE: -24 DEGREES
R AXIS - MUSE: -47 DEGREES
RBC # BLD AUTO: 3.58 10E6/UL (ref 3.8–5.2)
RBC # BLD AUTO: 3.65 10E6/UL (ref 3.8–5.2)
RBC # BLD AUTO: 3.89 10E6/UL (ref 3.8–5.2)
SODIUM SERPL-SCNC: 126 MMOL/L (ref 135–145)
SODIUM SERPL-SCNC: 126 MMOL/L (ref 136–145)
SODIUM SERPL-SCNC: 130 MMOL/L (ref 135–145)
SODIUM SERPL-SCNC: 136 MMOL/L (ref 135–145)
SODIUM SERPL-SCNC: 140 MMOL/L (ref 136–145)
SYSTOLIC BLOOD PRESSURE - MUSE: NORMAL MMHG
SYSTOLIC BLOOD PRESSURE - MUSE: NORMAL MMHG
T AXIS - MUSE: 48 DEGREES
T AXIS - MUSE: 68 DEGREES
TRIGL SERPL-MCNC: 140 MG/DL
TROPONIN T SERPL HS-MCNC: 17 NG/L
TSH SERPL DL<=0.005 MIU/L-ACNC: 2.18 UIU/ML (ref 0.3–4.2)
VENTRICULAR RATE- MUSE: 75 BPM
VENTRICULAR RATE- MUSE: 79 BPM
VIT B12 SERPL-MCNC: 848 PG/ML (ref 232–1245)
WBC # BLD AUTO: 5.8 10E3/UL (ref 4–11)
WBC # BLD AUTO: 6.9 10E3/UL (ref 4–11)
WBC # BLD AUTO: 9.8 10E3/UL (ref 4–11)

## 2023-01-01 PROCEDURE — 36415 COLL VENOUS BLD VENIPUNCTURE: CPT | Performed by: EMERGENCY MEDICINE

## 2023-01-01 PROCEDURE — 255N000002 HC RX 255 OP 636: Performed by: INTERNAL MEDICINE

## 2023-01-01 PROCEDURE — 76705 ECHO EXAM OF ABDOMEN: CPT

## 2023-01-01 PROCEDURE — 82728 ASSAY OF FERRITIN: CPT | Performed by: INTERNAL MEDICINE

## 2023-01-01 PROCEDURE — 71275 CT ANGIOGRAPHY CHEST: CPT

## 2023-01-01 PROCEDURE — 84484 ASSAY OF TROPONIN QUANT: CPT | Performed by: EMERGENCY MEDICINE

## 2023-01-01 PROCEDURE — 36415 COLL VENOUS BLD VENIPUNCTURE: CPT

## 2023-01-01 PROCEDURE — 99214 OFFICE O/P EST MOD 30 MIN: CPT | Mod: 25 | Performed by: NURSE PRACTITIONER

## 2023-01-01 PROCEDURE — 99215 OFFICE O/P EST HI 40 MIN: CPT | Mod: 93 | Performed by: FAMILY MEDICINE

## 2023-01-01 PROCEDURE — 99214 OFFICE O/P EST MOD 30 MIN: CPT | Mod: VID | Performed by: FAMILY MEDICINE

## 2023-01-01 PROCEDURE — 85027 COMPLETE CBC AUTOMATED: CPT | Performed by: NURSE PRACTITIONER

## 2023-01-01 PROCEDURE — 80061 LIPID PANEL: CPT

## 2023-01-01 PROCEDURE — 80048 BASIC METABOLIC PNL TOTAL CA: CPT

## 2023-01-01 PROCEDURE — 36415 COLL VENOUS BLD VENIPUNCTURE: CPT | Performed by: INTERNAL MEDICINE

## 2023-01-01 PROCEDURE — 84443 ASSAY THYROID STIM HORMONE: CPT

## 2023-01-01 PROCEDURE — 90662 IIV NO PRSV INCREASED AG IM: CPT | Performed by: INTERNAL MEDICINE

## 2023-01-01 PROCEDURE — 250N000011 HC RX IP 250 OP 636: Performed by: EMERGENCY MEDICINE

## 2023-01-01 PROCEDURE — 250N000009 HC RX 250: Performed by: EMERGENCY MEDICINE

## 2023-01-01 PROCEDURE — 80053 COMPREHEN METABOLIC PANEL: CPT | Performed by: EMERGENCY MEDICINE

## 2023-01-01 PROCEDURE — 83540 ASSAY OF IRON: CPT | Performed by: INTERNAL MEDICINE

## 2023-01-01 PROCEDURE — 82570 ASSAY OF URINE CREATININE: CPT | Performed by: INTERNAL MEDICINE

## 2023-01-01 PROCEDURE — 94618 PULMONARY STRESS TESTING: CPT

## 2023-01-01 PROCEDURE — 93880 EXTRACRANIAL BILAT STUDY: CPT

## 2023-01-01 PROCEDURE — 82565 ASSAY OF CREATININE: CPT

## 2023-01-01 PROCEDURE — 96127 BRIEF EMOTIONAL/BEHAV ASSMT: CPT | Mod: 93 | Performed by: FAMILY MEDICINE

## 2023-01-01 PROCEDURE — 36415 COLL VENOUS BLD VENIPUNCTURE: CPT | Performed by: NURSE PRACTITIONER

## 2023-01-01 PROCEDURE — 83550 IRON BINDING TEST: CPT | Performed by: INTERNAL MEDICINE

## 2023-01-01 PROCEDURE — 93005 ELECTROCARDIOGRAM TRACING: CPT

## 2023-01-01 PROCEDURE — 99442 PR PHYSICIAN TELEPHONE EVALUATION 11-20 MIN: CPT | Mod: 93 | Performed by: FAMILY MEDICINE

## 2023-01-01 PROCEDURE — G0463 HOSPITAL OUTPT CLINIC VISIT: HCPCS | Performed by: INTERNAL MEDICINE

## 2023-01-01 PROCEDURE — 85027 COMPLETE CBC AUTOMATED: CPT

## 2023-01-01 PROCEDURE — 99285 EMERGENCY DEPT VISIT HI MDM: CPT | Mod: 25

## 2023-01-01 PROCEDURE — 93005 ELECTROCARDIOGRAM TRACING: CPT | Mod: 76

## 2023-01-01 PROCEDURE — 82043 UR ALBUMIN QUANTITATIVE: CPT | Performed by: INTERNAL MEDICINE

## 2023-01-01 PROCEDURE — 99205 OFFICE O/P NEW HI 60 MIN: CPT | Performed by: INTERNAL MEDICINE

## 2023-01-01 PROCEDURE — G0008 ADMIN INFLUENZA VIRUS VAC: HCPCS | Performed by: INTERNAL MEDICINE

## 2023-01-01 PROCEDURE — 85379 FIBRIN DEGRADATION QUANT: CPT | Performed by: EMERGENCY MEDICINE

## 2023-01-01 PROCEDURE — 80048 BASIC METABOLIC PNL TOTAL CA: CPT | Performed by: INTERNAL MEDICINE

## 2023-01-01 PROCEDURE — 85025 COMPLETE CBC W/AUTO DIFF WBC: CPT | Performed by: EMERGENCY MEDICINE

## 2023-01-01 PROCEDURE — 71250 CT THORAX DX C-: CPT

## 2023-01-01 PROCEDURE — 99215 OFFICE O/P EST HI 40 MIN: CPT | Mod: VID | Performed by: PHYSICIAN ASSISTANT

## 2023-01-01 PROCEDURE — 93306 TTE W/DOPPLER COMPLETE: CPT | Mod: 26 | Performed by: INTERNAL MEDICINE

## 2023-01-01 PROCEDURE — 75635 CT ANGIO ABDOMINAL ARTERIES: CPT

## 2023-01-01 PROCEDURE — 99214 OFFICE O/P EST MOD 30 MIN: CPT | Mod: 25 | Performed by: INTERNAL MEDICINE

## 2023-01-01 PROCEDURE — 80053 COMPREHEN METABOLIC PANEL: CPT

## 2023-01-01 PROCEDURE — 82746 ASSAY OF FOLIC ACID SERUM: CPT

## 2023-01-01 PROCEDURE — 82607 VITAMIN B-12: CPT

## 2023-01-01 PROCEDURE — 250N000011 HC RX IP 250 OP 636: Performed by: INTERNAL MEDICINE

## 2023-01-01 PROCEDURE — 83880 ASSAY OF NATRIURETIC PEPTIDE: CPT | Performed by: EMERGENCY MEDICINE

## 2023-01-01 PROCEDURE — 250N000009 HC RX 250: Performed by: INTERNAL MEDICINE

## 2023-01-01 PROCEDURE — 93000 ELECTROCARDIOGRAM COMPLETE: CPT | Mod: 59 | Performed by: NURSE PRACTITIONER

## 2023-01-01 PROCEDURE — 80048 BASIC METABOLIC PNL TOTAL CA: CPT | Performed by: NURSE PRACTITIONER

## 2023-01-01 PROCEDURE — 83735 ASSAY OF MAGNESIUM: CPT | Performed by: EMERGENCY MEDICINE

## 2023-01-01 PROCEDURE — 99214 OFFICE O/P EST MOD 30 MIN: CPT | Performed by: INTERNAL MEDICINE

## 2023-01-01 PROCEDURE — 999N000208 ECHOCARDIOGRAM COMPLETE

## 2023-01-01 RX ORDER — CITALOPRAM HYDROBROMIDE 20 MG/1
20 TABLET ORAL DAILY
Qty: 30 TABLET | Refills: 1 | Status: SHIPPED | OUTPATIENT
Start: 2023-01-01 | End: 2024-01-01

## 2023-01-01 RX ORDER — ROSUVASTATIN CALCIUM 20 MG/1
20 TABLET, COATED ORAL DAILY
Qty: 90 TABLET | Refills: 3 | Status: SHIPPED | OUTPATIENT
Start: 2023-01-01 | End: 2024-01-01

## 2023-01-01 RX ORDER — TIZANIDINE HYDROCHLORIDE 4 MG/1
4 CAPSULE, GELATIN COATED ORAL 3 TIMES DAILY PRN
COMMUNITY

## 2023-01-01 RX ORDER — NALTREXONE HYDROCHLORIDE 50 MG/1
50 TABLET, FILM COATED ORAL DAILY
Qty: 30 TABLET | Refills: 1 | Status: SHIPPED | OUTPATIENT
Start: 2023-01-01 | End: 2023-01-01 | Stop reason: SINTOL

## 2023-01-01 RX ORDER — DOXEPIN HYDROCHLORIDE 10 MG/1
20 CAPSULE ORAL AT BEDTIME
Qty: 60 CAPSULE | Refills: 1 | Status: SHIPPED | OUTPATIENT
Start: 2023-01-01 | End: 2023-01-01

## 2023-01-01 RX ORDER — TRAZODONE HYDROCHLORIDE 100 MG/1
100 TABLET ORAL AT BEDTIME
Qty: 30 TABLET | Refills: 1 | Status: SHIPPED | OUTPATIENT
Start: 2023-01-01 | End: 2023-01-01

## 2023-01-01 RX ORDER — METOPROLOL TARTRATE 25 MG/1
25 TABLET, FILM COATED ORAL 2 TIMES DAILY
Qty: 180 TABLET | Refills: 3 | Status: SHIPPED | OUTPATIENT
Start: 2023-01-01 | End: 2023-01-01

## 2023-01-01 RX ORDER — GABAPENTIN 600 MG/1
600 TABLET ORAL AT BEDTIME
Qty: 30 TABLET | Refills: 0 | Status: SHIPPED | OUTPATIENT
Start: 2023-01-01 | End: 2023-01-01

## 2023-01-01 RX ORDER — GABAPENTIN 600 MG/1
600 TABLET ORAL AT BEDTIME
Qty: 30 TABLET | Refills: 1 | Status: SHIPPED | OUTPATIENT
Start: 2023-01-01 | End: 2023-01-01

## 2023-01-01 RX ORDER — GABAPENTIN 600 MG/1
600 TABLET ORAL AT BEDTIME
Qty: 30 TABLET | Refills: 0 | Status: SHIPPED | OUTPATIENT
Start: 2023-01-01 | End: 2024-01-01

## 2023-01-01 RX ORDER — BUPRENORPHINE 5 UG/H
1 PATCH TRANSDERMAL WEEKLY
COMMUNITY
Start: 2023-01-01 | End: 2023-01-01

## 2023-01-01 RX ORDER — TRAZODONE HYDROCHLORIDE 100 MG/1
100 TABLET ORAL AT BEDTIME
Qty: 30 TABLET | Refills: 1 | Status: SHIPPED | OUTPATIENT
Start: 2023-01-01 | End: 2024-01-01

## 2023-01-01 RX ORDER — METOPROLOL TARTRATE 50 MG
50 TABLET ORAL 2 TIMES DAILY
Qty: 180 TABLET | Refills: 3 | Status: SHIPPED | OUTPATIENT
Start: 2023-01-01 | End: 2024-01-01 | Stop reason: DRUGHIGH

## 2023-01-01 RX ORDER — DOXEPIN HYDROCHLORIDE 10 MG/1
20 CAPSULE ORAL AT BEDTIME
Qty: 60 CAPSULE | Refills: 0 | Status: SHIPPED | OUTPATIENT
Start: 2023-01-01 | End: 2024-01-01

## 2023-01-01 RX ORDER — LISINOPRIL/HYDROCHLOROTHIAZIDE 10-12.5 MG
1 TABLET ORAL DAILY
Qty: 90 TABLET | Refills: 0 | Status: SHIPPED | OUTPATIENT
Start: 2023-01-01 | End: 2023-01-01 | Stop reason: ALTCHOICE

## 2023-01-01 RX ORDER — IOPAMIDOL 755 MG/ML
91 INJECTION, SOLUTION INTRAVASCULAR ONCE
Status: COMPLETED | OUTPATIENT
Start: 2023-01-01 | End: 2023-01-01

## 2023-01-01 RX ORDER — AMLODIPINE BESYLATE 10 MG/1
10 TABLET ORAL DAILY
Qty: 90 TABLET | Refills: 3 | Status: SHIPPED | OUTPATIENT
Start: 2023-01-01 | End: 2023-01-01 | Stop reason: ALTCHOICE

## 2023-01-01 RX ORDER — ACAMPROSATE CALCIUM 333 MG/1
666 TABLET, DELAYED RELEASE ORAL 3 TIMES DAILY
Qty: 90 TABLET | Refills: 1 | Status: SHIPPED | OUTPATIENT
Start: 2023-01-01 | End: 2024-01-01

## 2023-01-01 RX ORDER — DOXEPIN HYDROCHLORIDE 10 MG/1
10 CAPSULE ORAL AT BEDTIME
Qty: 30 CAPSULE | Refills: 1 | Status: SHIPPED | OUTPATIENT
Start: 2023-01-01 | End: 2023-01-01

## 2023-01-01 RX ORDER — LOSARTAN POTASSIUM 50 MG/1
50 TABLET ORAL DAILY
Qty: 90 TABLET | Refills: 3 | Status: ON HOLD | OUTPATIENT
Start: 2023-01-01 | End: 2024-01-01

## 2023-01-01 RX ORDER — IOPAMIDOL 755 MG/ML
100 INJECTION, SOLUTION INTRAVASCULAR ONCE
Status: COMPLETED | OUTPATIENT
Start: 2023-01-01 | End: 2023-01-01

## 2023-01-01 RX ORDER — ZOLPIDEM TARTRATE 6.25 MG/1
6.25 TABLET, FILM COATED, EXTENDED RELEASE ORAL AT BEDTIME
COMMUNITY
Start: 2023-01-01 | End: 2024-01-01

## 2023-01-01 RX ORDER — VALACYCLOVIR HYDROCHLORIDE 1 G/1
1 TABLET, FILM COATED ORAL PRN
Qty: 4 TABLET | Refills: 3 | Status: SHIPPED | OUTPATIENT
Start: 2023-01-01 | End: 2024-01-01

## 2023-01-01 RX ORDER — CITALOPRAM HYDROBROMIDE 10 MG/1
10 TABLET ORAL DAILY
Qty: 30 TABLET | Refills: 1 | Status: SHIPPED | OUTPATIENT
Start: 2023-01-01 | End: 2023-01-01

## 2023-01-01 RX ORDER — DOXEPIN HYDROCHLORIDE 10 MG/1
20 CAPSULE ORAL AT BEDTIME
Qty: 60 CAPSULE | Refills: 0 | Status: SHIPPED | OUTPATIENT
Start: 2023-01-01 | End: 2023-01-01

## 2023-01-01 RX ADMIN — SODIUM CHLORIDE 92 ML: 9 INJECTION, SOLUTION INTRAVENOUS at 18:59

## 2023-01-01 RX ADMIN — IOPAMIDOL 68 ML: 755 INJECTION, SOLUTION INTRAVENOUS at 18:59

## 2023-01-01 RX ADMIN — SODIUM CHLORIDE 80 ML: 9 INJECTION, SOLUTION INTRAVENOUS at 14:13

## 2023-01-01 RX ADMIN — IOPAMIDOL 100 ML: 755 INJECTION, SOLUTION INTRAVENOUS at 14:13

## 2023-01-01 RX ADMIN — HUMAN ALBUMIN MICROSPHERES AND PERFLUTREN 9 ML: 10; .22 INJECTION, SOLUTION INTRAVENOUS at 10:37

## 2023-01-01 ASSESSMENT — PATIENT HEALTH QUESTIONNAIRE - PHQ9
10. IF YOU CHECKED OFF ANY PROBLEMS, HOW DIFFICULT HAVE THESE PROBLEMS MADE IT FOR YOU TO DO YOUR WORK, TAKE CARE OF THINGS AT HOME, OR GET ALONG WITH OTHER PEOPLE: SOMEWHAT DIFFICULT
SUM OF ALL RESPONSES TO PHQ QUESTIONS 1-9: 5
SUM OF ALL RESPONSES TO PHQ QUESTIONS 1-9: 5
10. IF YOU CHECKED OFF ANY PROBLEMS, HOW DIFFICULT HAVE THESE PROBLEMS MADE IT FOR YOU TO DO YOUR WORK, TAKE CARE OF THINGS AT HOME, OR GET ALONG WITH OTHER PEOPLE: NOT DIFFICULT AT ALL
SUM OF ALL RESPONSES TO PHQ QUESTIONS 1-9: 2
SUM OF ALL RESPONSES TO PHQ QUESTIONS 1-9: 10
SUM OF ALL RESPONSES TO PHQ QUESTIONS 1-9: 10
SUM OF ALL RESPONSES TO PHQ QUESTIONS 1-9: 2
10. IF YOU CHECKED OFF ANY PROBLEMS, HOW DIFFICULT HAVE THESE PROBLEMS MADE IT FOR YOU TO DO YOUR WORK, TAKE CARE OF THINGS AT HOME, OR GET ALONG WITH OTHER PEOPLE: SOMEWHAT DIFFICULT

## 2023-01-01 ASSESSMENT — ENCOUNTER SYMPTOMS
ARTHRALGIAS: 0
BREAST MASS: 0
SHORTNESS OF BREATH: 0
MYALGIAS: 0

## 2023-01-01 ASSESSMENT — ACTIVITIES OF DAILY LIVING (ADL)
ADLS_ACUITY_SCORE: 35
CURRENT_FUNCTION: NO ASSISTANCE NEEDED

## 2023-01-01 ASSESSMENT — ASTHMA QUESTIONNAIRES: ACT_TOTALSCORE: 22

## 2023-01-01 ASSESSMENT — PAIN SCALES - GENERAL: PAINLEVEL: MILD PAIN (3)

## 2023-01-04 ENCOUNTER — TELEPHONE (OUTPATIENT)
Dept: CARDIOLOGY | Facility: CLINIC | Age: 78
End: 2023-01-04

## 2023-01-04 DIAGNOSIS — G89.4 CHRONIC PAIN SYNDROME: ICD-10-CM

## 2023-01-04 DIAGNOSIS — F51.04 CHRONIC INSOMNIA: ICD-10-CM

## 2023-01-04 NOTE — TELEPHONE ENCOUNTER
[facility] Call Center    Phone Message    May a detailed message be left on voicemail: yes     Reason for Call: Other:     Pt is requesting a callback to schedule follow up TAVR, 6 mos    Action Taken: Other: cardio    Travel Screening: Not Applicable     Thank you!  [facility]

## 2023-01-05 ENCOUNTER — TELEPHONE (OUTPATIENT)
Dept: ADDICTION MEDICINE | Facility: CLINIC | Age: 78
End: 2023-01-05

## 2023-01-05 NOTE — TELEPHONE ENCOUNTER
Date of Last Office Visit: 7/15/2022  Date of Next Office Visit: 3/1/2023  No shows since last visit: None  Cancellations since last visit: 12/20/2022 (provider)    Medication requested: gapapentin 600 mg tab Date last ordered: 7/15/2022 Qty: 90 Refills: 1    Per [place] :    Pt should have enough medication until the end of January based on sold date of 10/27/2022.    Medication requested: trazodone 100 mg tab Date last ordered: 7/15/2022 Qty: 30 Refills: 3  Per pharmacy, this medication is filled and ready for .    Medication requested: quetiapine 50 mg tab (doesn't appear this med has ever been prescribed by Dr. Ackerman)    Called pt regarding refill request. No answer. LVM requesting that pt return phone call to Gabriela DILLON to discuss refill request.     Review of [place] ?: Yes, see above  Other controlled substance on [place] ?: No    Last visit treatment plan:   ASSESSMENT/PLAN  Diagnoses and all orders for this visit:  Alcohol use disorder, severe, dependence (H)  Chronic insomnia  -     gabapentin (NEURONTIN) 600 MG tablet; Take 1 tablet (600 mg) by mouth At Bedtime  -     QUEtiapine (SEROQUEL XR) 150 MG TB24 24 hr tablet; Take 2 tablets (300 mg) by mouth At Bedtime  -     traZODone (DESYREL) 100 MG tablet; TAKE 1 TABLET NIGHTLY AS NEEDED FOR SLEEP  Chronic pain syndrome  -     gabapentin (NEURONTIN) 600 MG tablet; Take 1 tablet (600 mg) by mouth At Bedtime          Orders Placed This Encounter   Medications     DISCONTD: QUEtiapine (SEROQUEL XR) 150 MG TB24 24 hr tablet       Sig: Take 2 tablets (300 mg) by mouth At Bedtime       Dispense:  60 tablet       Refill:  0     DISCONTD: traZODone (DESYREL) 100 MG tablet       Sig: TAKE 1 TABLET NIGHTLY AS NEEDED FOR SLEEP       Dispense:  30 tablet       Refill:  1     gabapentin (NEURONTIN) 600 MG tablet       Sig: Take 1 tablet (600 mg) by mouth At Bedtime       Dispense:  90 tablet       Refill:  1     QUEtiapine (SEROQUEL XR) 150 MG TB24 24 hr tablet        Sig: Take 2 tablets (300 mg) by mouth At Bedtime       Dispense:  60 tablet       Refill:  3     traZODone (DESYREL) 100 MG tablet       Sig: TAKE 1 TABLET NIGHTLY AS NEEDED FOR SLEEP       Dispense:  30 tablet       Refill:  3         Problem list updated Jul 15, 2022   No problems updated.        Jul 15, 2022  - No alcohol use since March after attending treatment in [place]. 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  - Medication regimen simplified - refills as above  - New PCP soon  - No further SE from seroquel; will continue this given Linda's consistent understanding of the potential risks of metabolic syndrome     Last encounter A/P  Feb 2, 2022  - Encouraged her to take campral as prescribed, consistently on schedule, to help reduce cravings and amount of alcohol consumed  - Will call to follow-up regarding her success in stopping/reducing alcohol use  - Recommended ED visit given reports of withdrawal symptoms; she jignesh consider this  - referrals placed for MH and treatment resources               PDMP Review        Value Time User     State PDMP site checked  Yes 7/15/2022  4:02 PM Jin Ackerman MD             RTC  Return in 5 months (on 12/20/2022) for video visit, 3pm.      []Medication refilled per  Medication Refill in Ambulatory Care  policy.  [x]Medication unable to be refilled by RN due to criteria not met as indicated below:    []Eligibility - not seen in the last year   []Supervision - no future appointment   []Compliance - no shows, cancellations or lapse in therapy   []Verification - order discrepancy   []Controlled medication   []Medication not included in policy   []90-day supply request   [x]Other - see notes above in red

## 2023-01-08 ENCOUNTER — TELEPHONE (OUTPATIENT)
Dept: ADDICTION MEDICINE | Facility: CLINIC | Age: 78
End: 2023-01-08

## 2023-01-08 DIAGNOSIS — G89.4 CHRONIC PAIN SYNDROME: ICD-10-CM

## 2023-01-08 DIAGNOSIS — F51.04 CHRONIC INSOMNIA: ICD-10-CM

## 2023-01-09 ENCOUNTER — TELEPHONE (OUTPATIENT)
Dept: BEHAVIORAL HEALTH | Facility: CLINIC | Age: 78
End: 2023-01-09

## 2023-01-09 RX ORDER — GABAPENTIN 600 MG/1
TABLET ORAL
Qty: 90 TABLET | Refills: 1 | OUTPATIENT
Start: 2023-01-09

## 2023-01-09 NOTE — TELEPHONE ENCOUNTER
Returned phone call to nery MCCARTHY inquiring when she will be out of her gabapentin, as she filled 90 day supply on 10/27/2022 and should still have a couple of weeks worth remaining. See refill request telephone encounter from 1/4/2023 for additional information.

## 2023-01-09 NOTE — TELEPHONE ENCOUNTER
Reason for call:  Medication   If this is a refill request, has the caller requested the refill from the pharmacy already? No  Will the patient be using a [facility]? No  Name of the pharmacy and phone number for the current request: [id number] - [place], [place] - [address] AT [facility] [address]  [phone]    Name of the medication requested:gabapentin (NEURONTIN) 600 MG tablet    Other request:     Phone number to reach patient:  Home number on file [phone] (home)    Best Time:  ASAP    Can we leave a detailed message on this number?  YES    Travel screening: Not Applicable

## 2023-01-10 RX ORDER — GABAPENTIN 600 MG/1
600 TABLET ORAL AT BEDTIME
Qty: 15 TABLET | Refills: 0 | Status: SHIPPED | OUTPATIENT
Start: 2023-01-10 | End: 2023-01-11

## 2023-01-10 NOTE — TELEPHONE ENCOUNTER
"See attached note for additional information.    Called pt again and was finally able to reach her via phone.     Pt reports she at times takes gabapentin 1200 mg vs 600 mg at HS d/t such difficulty sleeping; thus, she ran out of medication early on Friday, Jan. 6th.     Pt reports she hasn't been sleeping well since being off this medication and had \"pretty vivid nightmares\" last night.    Pt denies any alcohol consumption and has a year sobriety nicole coming up on March 4th.    Routing refill request to covering providers and Dr. Ackerman to inform him.      "

## 2023-01-10 NOTE — TELEPHONE ENCOUNTER
Reason for call:  Medication   If this is a refill request, has the caller requested the refill from the pharmacy already? N/A  Will the patient be using a [facility]? N/A  Name of the pharmacy and phone number for the current request: [facility]    Name of the medication requested: Gabapentin    Other request: Pt returning call to Ryann from yesterday, reporting ran out of Gabapentin as of this past Friday, reports times when she would need to take a bit more than rx'd thus running out of medication.     Phone number to reach patient:  Home number on file [phone] (home)    Best Time:  Any    Can we leave a detailed message on this number?  YES    Travel screening: Not Applicable

## 2023-01-10 NOTE — TELEPHONE ENCOUNTER
Pt will need to follow up with Dr. Ackerman regarding dose change for Gabapentin. I have filled a #15 day bridge of Gabapentin at previously prescribed dose, 600 mg po at HS, so Dr. Ackerman will have adequate time to reach out to the patient to follow up.     1. Chronic insomnia  - gabapentin (NEURONTIN) 600 MG tablet; Take 1 tablet (600 mg) by mouth At Bedtime  Dispense: 15 tablet; Refill: 0    2. Chronic pain syndrome  - gabapentin (NEURONTIN) 600 MG tablet; Take 1 tablet (600 mg) by mouth At Bedtime  Dispense: 15 tablet; Refill: 0      ARISTEO Frank CNP on 1/10/2023 at 3:49 PM

## 2023-01-11 RX ORDER — TRAZODONE HYDROCHLORIDE 100 MG/1
100 TABLET ORAL AT BEDTIME
Qty: 30 TABLET | Refills: 0 | Status: SHIPPED | OUTPATIENT
Start: 2023-01-11 | End: 2023-03-27

## 2023-01-11 RX ORDER — GABAPENTIN 600 MG/1
600 TABLET ORAL AT BEDTIME
Qty: 30 TABLET | Refills: 0 | Status: SHIPPED | OUTPATIENT
Start: 2023-01-24 | End: 2023-02-27

## 2023-01-12 NOTE — TELEPHONE ENCOUNTER
Increasing gabapentin to 1200mg is not an appropriate therapeutic dosage given the concerns she is expressing, especially as it has caused her withdrawal symptoms d/t abrupt cessation recently after running out.    RN please call to let her know we will keep her on the 600mg dose and will not refill early if she continues to take 1200mg HS. I will refill the trazodone for 1 month. Agree that I have not prescribed quetiapine 50mg - please clarify request (is she wanting 300mg filled?). Advise she work with her PCP to discuss further options for insomnia - will refill again if she is unable to see her PCP within the next month.    Routing to PCP - as I am unable to see the patient until March, and am no longer actively treating alcohol use disorder, would be best for her to resume care with primary care for these concerns. At our next appt, she will be asked to transition back to primary care or psychiatry for current medication regimen.    Thanks,  Jin Ackerman MD

## 2023-01-18 ENCOUNTER — TRANSFERRED RECORDS (OUTPATIENT)
Dept: HEALTH INFORMATION MANAGEMENT | Facility: CLINIC | Age: 78
End: 2023-01-18
Payer: COMMERCIAL

## 2023-02-08 ENCOUNTER — OFFICE VISIT (OUTPATIENT)
Dept: CARDIOLOGY | Facility: CLINIC | Age: 78
End: 2023-02-08
Payer: COMMERCIAL

## 2023-02-08 ENCOUNTER — TRANSFERRED RECORDS (OUTPATIENT)
Dept: HEALTH INFORMATION MANAGEMENT | Facility: CLINIC | Age: 78
End: 2023-02-08

## 2023-02-08 VITALS
SYSTOLIC BLOOD PRESSURE: 128 MMHG | WEIGHT: 191 LBS | DIASTOLIC BLOOD PRESSURE: 83 MMHG | HEIGHT: 64 IN | BODY MASS INDEX: 32.61 KG/M2 | HEART RATE: 85 BPM | OXYGEN SATURATION: 95 %

## 2023-02-08 DIAGNOSIS — Z95.2 S/P TAVR (TRANSCATHETER AORTIC VALVE REPLACEMENT): Primary | ICD-10-CM

## 2023-02-08 DIAGNOSIS — E78.5 HYPERLIPIDEMIA LDL GOAL <100: ICD-10-CM

## 2023-02-08 DIAGNOSIS — I10 BENIGN ESSENTIAL HYPERTENSION: ICD-10-CM

## 2023-02-08 PROCEDURE — 99214 OFFICE O/P EST MOD 30 MIN: CPT | Performed by: INTERNAL MEDICINE

## 2023-02-08 RX ORDER — PRAVASTATIN SODIUM 20 MG
20 TABLET ORAL DAILY
Qty: 100 TABLET | Refills: 5 | Status: SHIPPED | OUTPATIENT
Start: 2023-02-08 | End: 2023-01-01

## 2023-02-08 NOTE — LETTER
"2/8/2023    Nayeli Castorena PA-C  [address] [facility] [address]    RE: Kavitha Headley       Dear Colleague,     I had the pleasure of seeing Kavitha Headley in the [facility].    Clinic visit note dictated. Dictation reference number - [id number]      PHYSICAL EXAMINATION:  Vitals: /83   Pulse 85   Ht 1.626 m (5' 4\")   Wt 86.6 kg (191 lb)   SpO2 95%   BMI 32.79 kg/m            Encounter Diagnoses   Name Primary?     S/P TAVR (transcatheter aortic valve replacement) Yes     Benign essential hypertension      Hyperlipidemia LDL goal <100          Orders Placed This Encounter   Procedures     CBC with platelets     Comprehensive metabolic panel     Lipid Profile     Follow-Up with Cardiology TARUN     Echocardiogram Complete           CURRENT MEDICATIONS:  Current Outpatient Medications   Medication Sig Dispense Refill     acetaminophen (TYLENOL) 500 MG tablet Take 1,000 mg by mouth 2 times daily as needed for pain       aspirin 81 MG EC tablet Take 81 mg by mouth daily       bisacodyl (DULCOLAX) 5 MG EC tablet Take 5 mg by mouth daily as needed for constipation       folic acid (FOLVITE) 1 MG tablet Take 1 mg by mouth daily       gabapentin (NEURONTIN) 600 MG tablet Take 1 tablet (600 mg) by mouth At Bedtime 30 tablet 0     MISC NATURAL PRODUCTS PO Take 1 tablet by mouth daily *L-Tryptophan 1000 mg*       Multiple Vitamins-Minerals (CENTRUM SILVER) per tablet Take 1 tablet by mouth daily       polyethylene glycol (MIRALAX) 17 GM/Dose powder Take 17 g by mouth 2 times daily as needed for constipation       pravastatin (PRAVACHOL) 20 MG tablet Take 1 tablet (20 mg) by mouth daily 100 tablet 5     QUEtiapine (SEROQUEL XR) 150 MG TB24 24 hr tablet TAKE 2 TABLETS (300 MG) BY MOUTH AT BEDTIME 180 tablet 0     QUEtiapine (SEROQUEL) 50 MG tablet Take 50 mg by mouth daily (In addition to the 300 mg nighttime dose)       traZODone (DESYREL) 100 MG tablet Take 1 tablet (100 mg) by mouth " At Bedtime 30 tablet 0     valACYclovir (VALTREX) 1000 mg tablet Take 2,000 mg by mouth as needed (onset of cold sore)           ALLERGIES:  Allergies   Allergen Reactions     Bactrim [Sulfamethoxazole W/Trimethoprim] Hives     Codeine Itching     NAUSEA     Morphine Itching     NAUSEA       PAST MEDICAL HISTORY:    Past Medical History:   Diagnosis Date     Alcohol abuse      Anxiety disorder      Ascending aorta dilatation (H)      Bariatric surgery status 1996?    gastric bypass, [facility] and     Benign hypertension      Chronic insomnia      Chronic pain syndrome     Chronic back and neck pain, chronic pain due to osteoarthritis multiple joints     Coronary artery disease involving native coronary artery of native heart without angina pectoris 10/16/2018    Minimal coronary artery disease on coronary angiogram in 2015.      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     Major depressive disorder, single episode, severe, without mention of psychotic behavior      Mixed hyperlipidemia      Pelvic relaxation disorder     Surgical intervention for cystocele/rectocele 3,11/2012     Personal history of urinary calculi 06/2006    left ureteral stone,lithotripsy     Psoriasis      Spinal stenosis      Stage III chronic kidney disease (H) 2005       PAST SURGICAL HISTORY:    Past Surgical History:   Procedure Laterality Date     APPENDECTOMY  3/2004    incidental     ARTHRODESIS TOE(S) Right 1/31/2020    Procedure: RIGHT FIRST METATARSAL PHALANGEAL JOINT ARTHRODESIS;  Surgeon: Steven Reyes MD;  Location: [facility]     C MEDIASTINOSCOPY W OR WO BIOPSY  2/2008    Videomediastinoscopy and, for mediastinal adenopathy -reactive lymphoid hyperplasia     CARPAL TUNNEL RELEASE  RT/LT  10/2010    Carpometacarpal excisional arthroplasty with a fascial autograft and APL suspension sling (35700). 2. Left thumb metacarpophalangeal joint fusion with autologous bone graft (46520). 3. Left endoscopic carpal tunnel release      CHOLECYSTECTOMY, LAPOROSCOPIC  11/2010    Cholecystectomy, Laparoscopic     COLONOSCOPY N/A 9/8/2016    Procedure: COMBINED COLONOSCOPY, SINGLE OR MULTIPLE BIOPSY/POLYPECTOMY BY BIOPSY;  Surgeon: Moe Barlow MD;  Location:  GI     CV CORONARY ANGIOGRAM N/A 6/20/2022    Procedure: Coronary Angiogram;  Surgeon: Nora Parker MD;  Location:  HEART CARDIAC CATH LAB     CV PCI N/A 6/20/2022    Procedure: Percutaneous Coronary Intervention;  Surgeon: Nora Parker MD;  Location:  HEART CARDIAC CATH LAB     CV RIGHT HEART CATH MEASUREMENTS RECORDED N/A 6/20/2022    Procedure: Right Heart Catheterization;  Surgeon: Nora Parker MD;  Location:  HEART CARDIAC CATH LAB     CV TRANSCATHETER AORTIC VALVE REPLACEMENT-FEMORAL APPROACH N/A 8/9/2022    Procedure: Transcatheter Aortic Valve Replacement-Femoral Approach;  Surgeon: Nora Parker MD;  Location: [facility] CARDIAC CATH LAB     CYSTOCELE REPAIR  11/2012    davinci laparoscopic sacrocolpopexy, enterocele repair, lysis of adhesions, placement of retropubic mid urethral sling, cystoscopy     CYSTOSCOPY, LITHOTRIPSY, COMBINED  6/2006    Left extracorporeal shock wave lithotripsy, cystoscopy, left ureteral stent placement.     CYSTOSCOPY, REMOVE STENT(S), COMBINED  7/2006    Cystoscopy, removal of left ureteral stent, retrograde pyelography, flexible and rigid ureteroscopy and holmium laser lithotripsy, basket removal of stone fragments, ureteral stent placement.      ENDOSCOPIC ULTRASOUND UPPER GASTROINTESTINAL TRACT (GI) N/A 6/12/2017    Procedure: ENDOSCOPIC ULTRASOUND, ESOPHAGOSCOPY / UPPER GASTROINTESTINAL TRACT (GI);  ENDOSCOPIC ULTRASOUND, ESOPHAGOSCOPY / UPPER GASTROINTESTINAL TRACT (GI);  Surgeon:  Parth Graham MD;  Location:  GI     HERNIA REPAIR  4/2012    bilateral augmentation mastopexy, ventral hernia repair, and medial thigh liposuction on 04/06/2012.      HYSTERECTOMY VAGINAL, BILATERAL SALPINGO-OOPHERECTOMY, COMBINED  1998    due to myoma and bleeding     JOINT REPLACEMENT, HIP RT/LT  4/2004    right total hip arthroplasty     LAPAROTOMY, LYSIS ADHESIONS, COMBINED  3/2004    lysis adhesions, ventral hernia repair, appendectomy incidentally     LYMPH NODE BIOPSY  4/2008    right axillary, reactive follicular and paracortical hyperplasia.     MAMMOPLASTY AUGMENTATION BILATERAL  4/2012     REPAIR HAMMER TOE Right 1/31/2020    Procedure: WITH SECOND AND THIRD CLAW TOE RECONSTRUCTION;  Surgeon: Steven Reyes MD;  Location:  OR     REVISE RECONSTRUCTED BREAST  6/7/2012    Left breast capsulotomy.      ZZC GASTRIC BYPASS,OBESE<100CM ARIANNA-EN-Y  1996     ZZC REPAIR OF RECTOCELE  3/2012     ZZC TOTAL KNEE ARTHROPLASTY  12/2005    left        FAMILY HISTORY:    Family History   Problem Relation Age of Onset     Substance Abuse Father      Cancer Father         throat and lung mets     Diabetes No family hx of      Coronary Artery Disease No family hx of      Cerebrovascular Disease No family hx of        SOCIAL HISTORY:    Social History     Socioeconomic History     Marital status:      Spouse name: Mt     Number of children: 4     Years of education: 18   Occupational History     Occupation: nurse     Employer: [employer]     Employer: RETIRED   Tobacco Use     Smoking status: Never     Smokeless tobacco: Never   Substance and Sexual Activity     Alcohol use: Not Currently     Alcohol/week: 63.0 standard drinks     Types: 63 Standard drinks or equivalent per week     Drug use: No     Sexual activity: Yes     Partners: Male   Other Topics Concern     Blood Transfusions No     Caffeine Concern Yes     Comment: 1-2 cups per day      Occupational Exposure Yes     Comment: blood     Hobby  Hazards No     Sleep Concern Yes     Stress Concern Yes     Weight Concern Yes     Comment: gastric  byepass     Special Diet No     Back Care No     Exercise Yes     Comment: walk, swin     Bike Helmet No     Seat Belt Yes     Self-Exams Yes                         Service Date: 02/08/2023    PRIMARY CARE PROVIDER:  Nayeli Castorena PA-C    REASON FOR VISIT:  Followup of transcatheter aortic valve replacement, hypertension, hyperlipidemia.    HISTORY OF PRESENT ILLNESS:    It was my pleasure to follow up with Linda, who is a delightful 77-year-old  lady who always comes beautifully dressed.  She has a significant other, has 4 children (1 son lives in [place], the other children live in the [facility]).  She has had with many ups and downs and has an inspiringly kind and compassionate personality.    Linda's history is significant for a transcatheter aortic valve replacement for severe aortic valve stenosis with a 23 mm Payne OLIVA 3 Ultra valve on 07/26/2022.  No procedural complications.  She was discharged home the following day.  Since having her valve replaced, her symptoms of exertional fatigue and dyspnea have completely resolved.  She feels well.  I personally reviewed her recent transthoracic echocardiogram images, which showed that the valve is well-functioning without any abnormal regurgitation and a mean systolic gradient of 10-12 mmHg.  Ascending aorta dilatation is stable at 4.2 cm.  Normal LVEF of 60%, normal right ventricular systolic function.    Her other comorbidities include a long history of alcohol dependency with multiple rehabilitations, most recently in 03/2021, stable mild ascending aorta dilatation of 4.1 to 4.2 cm with normal aortic root (confirmed on CT angiogram), hypertension (BP is 128/83 today with a pulse of 85 BPM), hyperlipidemia (on 20 mg of pravastatin).  Total cholesterol 218, HDL 86, LDL 85, triglycerides 234).    Her preoperative pre-TAVR coronary angiogram showed  mild coronary disease without any obstructive lesions.  She has never had angina.    She has gained almost 20 pounds in weight over the last year and not surprisingly, her triglycerides have gone up from normal to 234.    PHYSICAL EXAMINATION:    VITAL SIGNS:  As documented.  CARDIOVASCULAR:  Regular heart sounds, soft 2/6 early systolic murmur.  No carotid bruit.  RESPIRATORY:  Normal breath sounds.  EXTREMITIES:  No edema.    DIAGNOSES AND ASSESSMENT:  1.  Successful transcatheter aortic valve replacement for severe aortic valve stenosis on 2022.  Valve is well-functioning with stable hemodynamics.  No abnormal regurgitation and preserved LVEF.  Most importantly since the valve replacement, her symptoms have resolved.  2.  Hypertension, well controlled.  3.  Mixed dyslipidemia in the context of a 20-pound weight gain.  This is most likely due to increased caloric intake.  LDL is at goal, so continue Pravastatin.  I advised her to consult with a nutritionist.    PLAN:    1.  Cardiac prescriptions renewed.  2.  I have ordered follow up with Cardiology TARUN in 1 year with pre-visit echocardiogram and fasting labs.    Total time today 35 minutes.  Established patient.    Herman Ugarte MD        D: 2023   T: 2023   MT: LEE    Name:     DARWIN JASSO  MRN:      [id number]        Account:      [id number]   :      1945           Service Date: 2023       Document: [id number]      Thank you for allowing me to participate in the care of your patient.      Sincerely,     Herman Ugarte MD     [facility]  cc:   Nora Parker MD  [address] [address]

## 2023-02-08 NOTE — PROGRESS NOTES
Service Date: 02/08/2023    PRIMARY CARE PROVIDER:  Nayeli Castorena PA-C    REASON FOR VISIT:  Followup of transcatheter aortic valve replacement, hypertension, hyperlipidemia.    HISTORY OF PRESENT ILLNESS:    It was my pleasure to follow up with Linda, who is a delightful 77-year-old  lady who always comes beautifully dressed.  She has a significant other, has 4 children (1 son lives in [place], the other children live in the [facility]).  She has had with many ups and downs and has an inspiringly kind and compassionate personality.    Linda's history is significant for a transcatheter aortic valve replacement for severe aortic valve stenosis with a 23 mm Payne OLIVA 3 Ultra valve on 07/26/2022.  No procedural complications.  She was discharged home the following day.  Since having her valve replaced, her symptoms of exertional fatigue and dyspnea have completely resolved.  She feels well.  I personally reviewed her recent transthoracic echocardiogram images, which showed that the valve is well-functioning without any abnormal regurgitation and a mean systolic gradient of 10-12 mmHg.  Ascending aorta dilatation is stable at 4.2 cm.  Normal LVEF of 60%, normal right ventricular systolic function.    Her other comorbidities include a long history of alcohol dependency with multiple rehabilitations, most recently in 03/2021, stable mild ascending aorta dilatation of 4.1 to 4.2 cm with normal aortic root (confirmed on CT angiogram), hypertension (BP is 128/83 today with a pulse of 85 BPM), hyperlipidemia (on 20 mg of pravastatin).  Total cholesterol 218, HDL 86, LDL 85, triglycerides 234).    Her preoperative pre-TAVR coronary angiogram showed mild coronary disease without any obstructive lesions.  She has never had angina.    She has gained almost 20 pounds in weight over the last year and not surprisingly, her triglycerides have gone up from normal to 234.    PHYSICAL EXAMINATION:    VITAL SIGNS:  As  documented.  CARDIOVASCULAR:  Regular heart sounds, soft 2/6 early systolic murmur.  No carotid bruit.  RESPIRATORY:  Normal breath sounds.  EXTREMITIES:  No edema.    DIAGNOSES AND ASSESSMENT:  1.  Successful transcatheter aortic valve replacement for severe aortic valve stenosis on 2022.  Valve is well-functioning with stable hemodynamics.  No abnormal regurgitation and preserved LVEF.  Most importantly since the valve replacement, her symptoms have resolved.  2.  Hypertension, well controlled.  3.  Mixed dyslipidemia in the context of a 20-pound weight gain.  This is most likely due to increased caloric intake.  LDL is at goal, so continue Pravastatin.  I advised her to consult with a nutritionist.    PLAN:    1.  Cardiac prescriptions renewed.  2.  I have ordered follow up with Cardiology TARUN in 1 year with pre-visit echocardiogram and fasting labs.    Total time today 35 minutes.  Established patient.    Herman Ugarte MD        D: 2023   T: 2023   MT: LEE    Name:     DARWIN JASSO  MRN:      [id number]        Account:      [id number]   :      1945           Service Date: 2023       Document: [id number]

## 2023-02-08 NOTE — PROGRESS NOTES
"  Clinic visit note dictated. Dictation reference number - [id number]      PHYSICAL EXAMINATION:  Vitals: /83   Pulse 85   Ht 1.626 m (5' 4\")   Wt 86.6 kg (191 lb)   SpO2 95%   BMI 32.79 kg/m            Encounter Diagnoses   Name Primary?     S/P TAVR (transcatheter aortic valve replacement) Yes     Benign essential hypertension      Hyperlipidemia LDL goal <100          Orders Placed This Encounter   Procedures     CBC with platelets     Comprehensive metabolic panel     Lipid Profile     Follow-Up with Cardiology TARUN     Echocardiogram Complete           CURRENT MEDICATIONS:  Current Outpatient Medications   Medication Sig Dispense Refill     acetaminophen (TYLENOL) 500 MG tablet Take 1,000 mg by mouth 2 times daily as needed for pain       aspirin 81 MG EC tablet Take 81 mg by mouth daily       bisacodyl (DULCOLAX) 5 MG EC tablet Take 5 mg by mouth daily as needed for constipation       folic acid (FOLVITE) 1 MG tablet Take 1 mg by mouth daily       gabapentin (NEURONTIN) 600 MG tablet Take 1 tablet (600 mg) by mouth At Bedtime 30 tablet 0     MISC NATURAL PRODUCTS PO Take 1 tablet by mouth daily *L-Tryptophan 1000 mg*       Multiple Vitamins-Minerals (CENTRUM SILVER) per tablet Take 1 tablet by mouth daily       polyethylene glycol (MIRALAX) 17 GM/Dose powder Take 17 g by mouth 2 times daily as needed for constipation       pravastatin (PRAVACHOL) 20 MG tablet Take 1 tablet (20 mg) by mouth daily 100 tablet 5     QUEtiapine (SEROQUEL XR) 150 MG TB24 24 hr tablet TAKE 2 TABLETS (300 MG) BY MOUTH AT BEDTIME 180 tablet 0     QUEtiapine (SEROQUEL) 50 MG tablet Take 50 mg by mouth daily (In addition to the 300 mg nighttime dose)       traZODone (DESYREL) 100 MG tablet Take 1 tablet (100 mg) by mouth At Bedtime 30 tablet 0     valACYclovir (VALTREX) 1000 mg tablet Take 2,000 mg by mouth as needed (onset of cold sore)           ALLERGIES:  Allergies   Allergen Reactions     Bactrim [Sulfamethoxazole " W/Trimethoprim] Hives     Codeine Itching     NAUSEA     Morphine Itching     NAUSEA       PAST MEDICAL HISTORY:    Past Medical History:   Diagnosis Date     Alcohol abuse      Anxiety disorder      Ascending aorta dilatation (H)      Bariatric surgery status 1996?    gastric bypass, [facility] and     Benign hypertension      Chronic insomnia      Chronic pain syndrome     Chronic back and neck pain, chronic pain due to osteoarthritis multiple joints     Coronary artery disease involving native coronary artery of native heart without angina pectoris 10/16/2018    Minimal coronary artery disease on coronary angiogram in 2015.      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     Major depressive disorder, single episode, severe, without mention of psychotic behavior      Mixed hyperlipidemia      Pelvic relaxation disorder     Surgical intervention for cystocele/rectocele 3,11/2012     Personal history of urinary calculi 06/2006    left ureteral stone,lithotripsy     Psoriasis      Spinal stenosis      Stage III chronic kidney disease (H) 2005       PAST SURGICAL HISTORY:    Past Surgical History:   Procedure Laterality Date     APPENDECTOMY  3/2004    incidental     ARTHRODESIS TOE(S) Right 1/31/2020    Procedure: RIGHT FIRST METATARSAL PHALANGEAL JOINT ARTHRODESIS;  Surgeon: Steven Reyes MD;  Location: [facility] OR     C MEDIASTINOSCOPY W OR WO BIOPSY  2/2008    Videomediastinoscopy and, for mediastinal adenopathy -reactive lymphoid hyperplasia     CARPAL TUNNEL RELEASE RT/LT  10/2010    Carpometacarpal excisional arthroplasty with a fascial autograft and APL suspension sling (56741). 2. Left thumb metacarpophalangeal joint fusion with autologous bone graft (16905). 3.  Left endoscopic carpal tunnel release      CHOLECYSTECTOMY, LAPOROSCOPIC  11/2010    Cholecystectomy, Laparoscopic     COLONOSCOPY N/A 9/8/2016    Procedure: COMBINED COLONOSCOPY, SINGLE OR MULTIPLE BIOPSY/POLYPECTOMY BY BIOPSY;  Surgeon: Moe Barlow MD;  Location:  GI     CV CORONARY ANGIOGRAM N/A 6/20/2022    Procedure: Coronary Angiogram;  Surgeon: Nora Parker MD;  Location:  HEART CARDIAC CATH LAB     CV PCI N/A 6/20/2022    Procedure: Percutaneous Coronary Intervention;  Surgeon: Nora Parker MD;  Location:  HEART CARDIAC CATH LAB     CV RIGHT HEART CATH MEASUREMENTS RECORDED N/A 6/20/2022    Procedure: Right Heart Catheterization;  Surgeon: Nora Parker MD;  Location:  HEART CARDIAC CATH LAB     CV TRANSCATHETER AORTIC VALVE REPLACEMENT-FEMORAL APPROACH N/A 8/9/2022    Procedure: Transcatheter Aortic Valve Replacement-Femoral Approach;  Surgeon: Nora Parker MD;  Location:  HEART CARDIAC CATH LAB     CYSTOCELE REPAIR  11/2012    davinci laparoscopic sacrocolpopexy, enterocele repair, lysis of adhesions, placement of retropubic mid urethral sling, cystoscopy     CYSTOSCOPY, LITHOTRIPSY, COMBINED  6/2006    Left extracorporeal shock wave lithotripsy, cystoscopy, left ureteral stent placement.     CYSTOSCOPY, REMOVE STENT(S), COMBINED  7/2006    Cystoscopy, removal of left ureteral stent, retrograde pyelography, flexible and rigid ureteroscopy and holmium laser lithotripsy, basket removal of stone fragments, ureteral stent placement.      ENDOSCOPIC ULTRASOUND UPPER GASTROINTESTINAL TRACT (GI) N/A 6/12/2017    Procedure: ENDOSCOPIC ULTRASOUND, ESOPHAGOSCOPY / UPPER GASTROINTESTINAL TRACT (GI);  ENDOSCOPIC ULTRASOUND, ESOPHAGOSCOPY / UPPER GASTROINTESTINAL TRACT (GI);  Surgeon: Parth Graham MD;  Location:  GI     HERNIA REPAIR  4/2012    bilateral augmentation mastopexy, ventral hernia repair, and medial thigh liposuction on 04/06/2012.      HYSTERECTOMY VAGINAL,  BILATERAL SALPINGO-OOPHERECTOMY, COMBINED  1998    due to myoma and bleeding     JOINT REPLACEMENT, HIP RT/LT  4/2004    right total hip arthroplasty     LAPAROTOMY, LYSIS ADHESIONS, COMBINED  3/2004    lysis adhesions, ventral hernia repair, appendectomy incidentally     LYMPH NODE BIOPSY  4/2008    right axillary, reactive follicular and paracortical hyperplasia.     MAMMOPLASTY AUGMENTATION BILATERAL  4/2012     REPAIR HAMMER TOE Right 1/31/2020    Procedure: WITH SECOND AND THIRD CLAW TOE RECONSTRUCTION;  Surgeon: Steven Reyes MD;  Location: [facility] OR     REVISE RECONSTRUCTED BREAST  6/7/2012    Left breast capsulotomy.      ZZC GASTRIC BYPASS,OBESE<100CM ARIANNA-EN-Y  1996     ZZC REPAIR OF RECTOCELE  3/2012     ZZC TOTAL KNEE ARTHROPLASTY  12/2005    left        FAMILY HISTORY:    Family History   Problem Relation Age of Onset     Substance Abuse Father      Cancer Father         throat and lung mets     Diabetes No family hx of      Coronary Artery Disease No family hx of      Cerebrovascular Disease No family hx of        SOCIAL HISTORY:    Social History     Socioeconomic History     Marital status:      Spouse name: Mt     Number of children: 4     Years of education: 18   Occupational History     Occupation: nurse     Employer: [employer]     Employer: RETIRED   Tobacco Use     Smoking status: Never     Smokeless tobacco: Never   Substance and Sexual Activity     Alcohol use: Not Currently     Alcohol/week: 63.0 standard drinks     Types: 63 Standard drinks or equivalent per week     Drug use: No     Sexual activity: Yes     Partners: Male   Other Topics Concern     Blood Transfusions No     Caffeine Concern Yes     Comment: 1-2 cups per day      Occupational Exposure Yes     Comment: blood     Hobby Hazards No     Sleep Concern Yes     Stress Concern Yes     Weight Concern Yes     Comment: gastric  byepass     Special Diet No     Back Care No     Exercise Yes     Comment: walk, swin     Bike Helmet  No     Seat Belt Yes     Self-Exams Yes

## 2023-02-14 ENCOUNTER — TELEPHONE (OUTPATIENT)
Dept: ADDICTION MEDICINE | Facility: CLINIC | Age: 78
End: 2023-02-14

## 2023-02-14 NOTE — TELEPHONE ENCOUNTER
Reason for call:  Medication   If this is a refill request, has the caller requested the refill from the pharmacy already? Yes  Will the patient be using a [facility]? No  Name of the pharmacy and phone number for the current request: [id number] - [place] - [address] [facility]    Name of the medication requested: QUEtiapine (SEROQUEL XR) 150 MG     Other request: Pt called stating she does not have enough med's to get to next appt states she is out. Called Pharm and they told her to contact clinic.   Please call client re: above     Phone number to reach patient:  Home number on file [phone] (home)    Best Time:  ASAP    Can we leave a detailed message on this number?  YES    Travel screening: Not Applicable

## 2023-02-23 NOTE — TELEPHONE ENCOUNTER
Attempt #1  Called patient @ [phone] - Left a non-detailed message to call back and speak with any triage nurse.      Sara Williamson RN  [facility]   Initiate Treatment: LDOM 1.25mg PO PO BID (cut the 2.5mg tablet in half and take one half in the AM and one half in the PM).\\nNutrafol Women's Balance 4 caps/day.\\n

## 2023-02-25 DIAGNOSIS — G89.4 CHRONIC PAIN SYNDROME: ICD-10-CM

## 2023-02-25 DIAGNOSIS — F51.04 CHRONIC INSOMNIA: ICD-10-CM

## 2023-02-27 DIAGNOSIS — F51.04 CHRONIC INSOMNIA: ICD-10-CM

## 2023-02-27 RX ORDER — GABAPENTIN 600 MG/1
TABLET ORAL
Qty: 30 TABLET | Refills: 0 | Status: SHIPPED | OUTPATIENT
Start: 2023-02-27 | End: 2023-03-27

## 2023-02-28 RX ORDER — QUETIAPINE 150 MG/1
300 TABLET, FILM COATED, EXTENDED RELEASE ORAL AT BEDTIME
Qty: 180 TABLET | Refills: 0 | OUTPATIENT
Start: 2023-02-28

## 2023-02-28 RX ORDER — QUETIAPINE FUMARATE 50 MG/1
TABLET, FILM COATED ORAL
Qty: 90 TABLET | Refills: 2 | OUTPATIENT
Start: 2023-02-28

## 2023-02-28 NOTE — TELEPHONE ENCOUNTER
Patient was already notified on 2/11, (see encounter) that medication would not be refilled until her appt on 3/1/23. If she needed a dose change then she should contact psychiatrist or PCP. Per Dr. Ackerman.     Sally Carty RN on 2/28/2023 at 9:42 AM

## 2023-03-27 ENCOUNTER — TELEPHONE (OUTPATIENT)
Dept: BEHAVIORAL HEALTH | Facility: CLINIC | Age: 78
End: 2023-03-27

## 2023-03-27 DIAGNOSIS — B00.1 COLD SORE: Primary | ICD-10-CM

## 2023-03-27 NOTE — TELEPHONE ENCOUNTER
Reason for call:  Medication     If this is a refill request, has the caller requested the refill from the pharmacy already? Yes     Will the patient be using a [facility]? No   Name of the pharmacy and phone number for the current request: [id number] [place] - [address] AT [place] [address]  [phone]    Name of the medication requested: trazadone + gabapentin (3 days left of each)    Other request: Pt scheduled a follow-up in May(soonest available)    Phone number to reach patient:  Home number on file [phone] (home)    Best Time:  ASAP    Can we leave a detailed message on this number?  YES    Travel screening: Not Applicable

## 2023-03-28 RX ORDER — VALACYCLOVIR HYDROCHLORIDE 1 G/1
2000 TABLET, FILM COATED ORAL PRN
Qty: 4 TABLET | Refills: 0 | Status: SHIPPED | OUTPATIENT
Start: 2023-03-28 | End: 2023-05-02

## 2023-03-28 NOTE — TELEPHONE ENCOUNTER
Routing refill request to provider for review/approval because:  Labs out of range:  creatinine  Creatinine   Date Value Ref Range Status   09/21/2022 1.13 (H) 0.52 - 1.04 mg/dL Final   05/12/2021 1.00 0.52 - 1.04 mg/dL Final      Patient needs to be seen because it has been more than 1 year since last office visit.     Tiara Aguayo RN  [facility]

## 2023-03-28 NOTE — TELEPHONE ENCOUNTER
Refilled for 1 cold sore outbreak -fasting physical due for further fills.    Please assist with scheduling      Nayeli Castorena MBA, MS, PA-C  [facility]

## 2023-04-18 ENCOUNTER — TELEPHONE (OUTPATIENT)
Dept: ADDICTION MEDICINE | Facility: CLINIC | Age: 78
End: 2023-04-18
Payer: COMMERCIAL

## 2023-04-18 DIAGNOSIS — F51.04 CHRONIC INSOMNIA: ICD-10-CM

## 2023-04-18 DIAGNOSIS — G89.4 CHRONIC PAIN SYNDROME: ICD-10-CM

## 2023-04-18 NOTE — TELEPHONE ENCOUNTER
Reason for call:  Medication     If this is a refill request, has the caller requested the refill from the pharmacy already? No     Will the patient be using a [facility]? Yes     Name of the pharmacy and phone number for the current request:   [id number] - [place], [place] - [address] AT [facility] [address]  [phone]    Name of the medication requested:   trazadone increase, sometimes I need a second 100 mg trazadone to sleep    gapabentin I would like to take one in the morning with the one at night, having so much joint pain    Other request: Please increase dosage on several medications.    Phone number to reach patient:  Home number on file [phone] (home)    Best Time:  ASAP    Can we leave a detailed message on this number?  YES    Travel screening: Not Applicable

## 2023-04-20 RX ORDER — GABAPENTIN 600 MG/1
600 TABLET ORAL AT BEDTIME
Qty: 30 TABLET | Refills: 0 | Status: SHIPPED | OUTPATIENT
Start: 2023-04-20 | End: 2023-05-22

## 2023-04-20 RX ORDER — TRAZODONE HYDROCHLORIDE 100 MG/1
100 TABLET ORAL AT BEDTIME
Qty: 30 TABLET | Refills: 0 | Status: SHIPPED | OUTPATIENT
Start: 2023-04-20 | End: 2023-05-31

## 2023-04-20 NOTE — TELEPHONE ENCOUNTER
I have not seen patient since July 15, 2022. Patient no-showed appt March 1, 2023.    She has not reported any recent alcohol use, cravings, or concerns for returning to use. I had previously recommended she follow-up with PCP and/or psychiatry for ongoing medication mgmt. Concerns noted are related to both mental and physical health symptoms, not related to addiction concerns.    Will NOT change medication dosage. She has appt May 4. If she misses this appt, I will not continue refilling any medication.    Routing to PCP to see if patient can be seen for appt within the next month. Or if they would approve of [facility] referral to see a psychiatrist within 4-6 weeks internally. This provider would refer back to PCP for ongoing maintenance management of medications after appropriate alterations have been made.    Jin Ackerman MD

## 2023-04-29 DIAGNOSIS — B00.1 COLD SORE: ICD-10-CM

## 2023-05-01 ENCOUNTER — TRANSFERRED RECORDS (OUTPATIENT)
Dept: HEALTH INFORMATION MANAGEMENT | Facility: CLINIC | Age: 78
End: 2023-05-01
Payer: COMMERCIAL

## 2023-05-02 RX ORDER — VALACYCLOVIR HYDROCHLORIDE 1 G/1
2000 TABLET, FILM COATED ORAL PRN
Qty: 4 TABLET | Refills: 1 | Status: SHIPPED | OUTPATIENT
Start: 2023-05-02 | End: 2023-06-19

## 2023-05-02 NOTE — TELEPHONE ENCOUNTER
Routing refill request to provider for review/approval because:   Antivirals for Herpes Protocol Failed 04/29/2023 12:07 PM   Protocol Details  Recent (12 mo) or future (30 days) visit within the authorizing provider's specialty    Normal serum creatinine on file in past 12 months        Next 5 appointments (look out 90 days)      Daryn 15, 2023  3:40 PM  (Arrive by 3:20 PM)  Adult Preventative Visit with Nayeli Castorena PA-C  [facility] ([facility] ) [address] [place] [id number] [phone]

## 2023-05-08 RX ORDER — AMOXICILLIN 500 MG/1
CAPSULE ORAL
COMMUNITY
Start: 2022-12-21 | End: 2023-01-01

## 2023-05-08 RX ORDER — METHYLPREDNISOLONE 4 MG
TABLET, DOSE PACK ORAL
COMMUNITY
Start: 2023-05-01 | End: 2023-06-09

## 2023-05-08 RX ORDER — CYCLOBENZAPRINE HCL 10 MG
1 TABLET ORAL 3 TIMES DAILY PRN
COMMUNITY
Start: 2023-05-01 | End: 2023-01-01

## 2023-05-08 RX ORDER — CITALOPRAM HYDROBROMIDE 20 MG/1
1 TABLET ORAL
COMMUNITY
Start: 2022-11-19 | End: 2023-01-01

## 2023-05-08 RX ORDER — ACYCLOVIR 400 MG/1
TABLET ORAL
COMMUNITY
Start: 2023-01-23 | End: 2023-01-01

## 2023-05-20 DIAGNOSIS — F51.04 CHRONIC INSOMNIA: ICD-10-CM

## 2023-05-20 DIAGNOSIS — G89.4 CHRONIC PAIN SYNDROME: ICD-10-CM

## 2023-05-22 RX ORDER — GABAPENTIN 600 MG/1
600 TABLET ORAL AT BEDTIME
Qty: 24 TABLET | Refills: 0 | Status: SHIPPED | OUTPATIENT
Start: 2023-05-22 | End: 2023-06-09

## 2023-05-22 NOTE — TELEPHONE ENCOUNTER
Pt's pharmacy requesting refill of gabapentin 600 mg tab. See telephone encounter dated 4/18/2023 for additional insight regarding this request.     Pt no-showed appt with Dr. Ackerman on 5/4/2023 with no follow-up scheduled with him. Per Dr. Ackerman's note on 4/20/2023:      Date of Last Office Visit: 7/15/2022  Date of Next Office Visit: Not scheduled  No shows since last visit: 3/1/2023, 5/4/2023  Cancellations since last visit: 12/20/2022    Medication requested: gabapentin 600 mg tab Date last ordered: 4/20/2023 Qty: 30 Refills: 0     [place] of [place] ?: [place]      [place] ?: [place]  If yes, is this a new medication?: N/A  If yes, name of medication: N/A and date filled: N/A    Lapse in medication adherence greater than 5 days?: No  If yes, call patient and gather details: N/A  Medication refill request verified as identical to current order?: No. Pending bridge to pt's appt with PCP, Nayeli Castorena, on 6/15/2023 for Nayeli to consider  Result of Last DAM, VPA, Li+ Level, CBC, or Carbamazepine Level (at or since last visit): N/A    Last visit treatment plan:   ASSESSMENT/PLAN  Diagnoses and all orders for this visit:  Alcohol use disorder, severe, dependence (H)  Chronic insomnia  -     gabapentin (NEURONTIN) 600 MG tablet; Take 1 tablet (600 mg) by mouth At Bedtime  -     QUEtiapine (SEROQUEL XR) 150 MG TB24 24 hr tablet; Take 2 tablets (300 mg) by mouth At Bedtime  -     traZODone (DESYREL) 100 MG tablet; TAKE 1 TABLET NIGHTLY AS NEEDED FOR SLEEP  Chronic pain syndrome  -     gabapentin (NEURONTIN) 600 MG tablet; Take 1 tablet (600 mg) by mouth At Bedtime          Orders Placed This Encounter   Medications     DISCONTD: QUEtiapine (SEROQUEL XR) 150 MG TB24 24 hr tablet       Sig: Take 2 tablets (300 mg) by mouth At Bedtime       Dispense:  60 tablet       Refill:  0     DISCONTD: traZODone (DESYREL) 100 MG tablet       Sig: TAKE 1 TABLET NIGHTLY AS NEEDED FOR SLEEP       Dispense:  30 tablet        Refill:  1     gabapentin (NEURONTIN) 600 MG tablet       Sig: Take 1 tablet (600 mg) by mouth At Bedtime       Dispense:  90 tablet       Refill:  1     QUEtiapine (SEROQUEL XR) 150 MG TB24 24 hr tablet       Sig: Take 2 tablets (300 mg) by mouth At Bedtime       Dispense:  60 tablet       Refill:  3     traZODone (DESYREL) 100 MG tablet       Sig: TAKE 1 TABLET NIGHTLY AS NEEDED FOR SLEEP       Dispense:  30 tablet       Refill:  3         Problem list updated Jul 15, 2022   No problems updated.        Jul 15, 2022  - No alcohol use since March after attending treatment in [place]. 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  - Medication regimen simplified - refills as above  - New PCP soon  - No further SE from seroquel; will continue this given Linda's consistent understanding of the potential risks of metabolic syndrome        Last encounter A/P  Feb 2, 2022  - Encouraged her to take campral as prescribed, consistently on schedule, to help reduce cravings and amount of alcohol consumed  - Will call to follow-up regarding her success in stopping/reducing alcohol use  - Recommended ED visit given reports of withdrawal symptoms; she jignesh consider this  - referrals placed for MH and treatment resources               PDMP Review        Value Time User     State PDMP site checked  Yes 7/15/2022  4:02 PM Jin Ackerman MD             RTC  Return in 5 months (on 12/20/2022) for video visit, 3pm.      []Medication refilled per  Medication Refill in Ambulatory Care  policy.  [x]Medication unable to be refilled by RN due to criteria not met as indicated below:    []Eligibility - not seen in the last year   [x]Supervision - no future appointment   [x]Compliance - no shows, cancellations or lapse in therapy   []Verification - order discrepancy   [x]Controlled medication   [x]Medication not included in policy   []90-day supply request   []Other    Routing to both Dr. Ackerman and Nayeli Castorena for consideration.

## 2023-05-31 DIAGNOSIS — F51.04 CHRONIC INSOMNIA: ICD-10-CM

## 2023-05-31 RX ORDER — TRAZODONE HYDROCHLORIDE 100 MG/1
TABLET ORAL
Qty: 15 TABLET | Refills: 0 | Status: SHIPPED | OUTPATIENT
Start: 2023-05-31 | End: 2023-01-01

## 2023-05-31 NOTE — TELEPHONE ENCOUNTER
Pt's pharmacy requesting refill of trazodone 100 mg tab. See telephone encounter dated 4/18/2023 for additional insight regarding this request.      Pt no-showed appts with Dr. Ackerman on 3/1/2023 and 5/4/2023. Pt now has appt scheduled with Dr. Ackerman on 7/5/2023. Per Dr. Ackerman's note on 4/20/2023:       Date of Last Office Visit: 7/15/2022  Date of Next Office Visit: 7/5/2023  No shows since last visit: 3/1/2023, 5/4/2023  Cancellations since last visit: 12/20/2022     Medication requested: trazodone 100 mg tab Date last ordered: 4/20/2023 Qty: 30 Refills: 0     [place]       [place] ?[place]  If yes, is this a new medication?: N/A  If yes, name of medication: N/A and date filled: N/A     Lapse in medication adherence greater than 5 days?: No. Last filled per pharmacy was 5/1/2023.  If yes, call patient and gather details: N/A  Medication refill request verified as identical to current order?: No. Pending bridge to pt's appt with PCP, Nayeli Castorena, on 6/15/2023 for Nayeli to consider  Result of Last DAM, VPA, Li+ Level, CBC, or Carbamazepine Level (at or since last visit): N/A     Last visit treatment plan:   ASSESSMENT/PLAN  Diagnoses and all orders for this visit:  Alcohol use disorder, severe, dependence (H)  Chronic insomnia  -     gabapentin (NEURONTIN) 600 MG tablet; Take 1 tablet (600 mg) by mouth At Bedtime  -     QUEtiapine (SEROQUEL XR) 150 MG TB24 24 hr tablet; Take 2 tablets (300 mg) by mouth At Bedtime  -     traZODone (DESYREL) 100 MG tablet; TAKE 1 TABLET NIGHTLY AS NEEDED FOR SLEEP  Chronic pain syndrome  -     gabapentin (NEURONTIN) 600 MG tablet; Take 1 tablet (600 mg) by mouth At Bedtime             Orders Placed This Encounter   Medications     DISCONTD: QUEtiapine (SEROQUEL XR) 150 MG TB24 24 hr tablet       Sig: Take 2 tablets (300 mg) by mouth At Bedtime       Dispense:  60 tablet       Refill:  0     DISCONTD: traZODone (DESYREL) 100 MG tablet        Sig: TAKE 1 TABLET NIGHTLY AS NEEDED FOR SLEEP       Dispense:  30 tablet       Refill:  1     gabapentin (NEURONTIN) 600 MG tablet       Sig: Take 1 tablet (600 mg) by mouth At Bedtime       Dispense:  90 tablet       Refill:  1     QUEtiapine (SEROQUEL XR) 150 MG TB24 24 hr tablet       Sig: Take 2 tablets (300 mg) by mouth At Bedtime       Dispense:  60 tablet       Refill:  3     traZODone (DESYREL) 100 MG tablet       Sig: TAKE 1 TABLET NIGHTLY AS NEEDED FOR SLEEP       Dispense:  30 tablet       Refill:  3         Problem list updated Jul 15, 2022   No problems updated.        Jul 15, 2022  - No alcohol use since March after attending treatment in [place]. 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  - Medication regimen simplified - refills as above  - New PCP soon  - No further SE from seroquel; will continue this given Linda's consistent understanding of the potential risks of metabolic syndrome        Last encounter A/P  Feb 2, 2022  - Encouraged her to take campral as prescribed, consistently on schedule, to help reduce cravings and amount of alcohol consumed  - Will call to follow-up regarding her success in stopping/reducing alcohol use  - Recommended ED visit given reports of withdrawal symptoms; she jignesh consider this  - referrals placed for MH and treatment resources                    PDMP Review        Value Time User     State PDMP site checked  Yes 7/15/2022  4:02 PM Jin Ackerman MD             RTC  Return in 5 months (on 12/20/2022) for video visit, 3pm.        []?Medication refilled per  Medication Refill in Ambulatory Care  policy.  [x]?Medication unable to be refilled by RN due to criteria not met as indicated below:               []?Eligibility - not seen in the last year              []?Supervision - no future appointment              [x]?Compliance - no shows, cancellations or lapse in therapy              []?Verification - order discrepancy              []?Controlled medication               []?Medication not included in policy              []?90-day supply request              []?Other     Routing to both Dr. Ackerman and Nayeli Castorena for consideration.

## 2023-06-08 DIAGNOSIS — F51.04 CHRONIC INSOMNIA: ICD-10-CM

## 2023-06-08 DIAGNOSIS — G89.4 CHRONIC PAIN SYNDROME: ICD-10-CM

## 2023-06-09 ENCOUNTER — TELEPHONE (OUTPATIENT)
Dept: BEHAVIORAL HEALTH | Facility: CLINIC | Age: 78
End: 2023-06-09
Payer: COMMERCIAL

## 2023-06-09 DIAGNOSIS — G89.4 CHRONIC PAIN SYNDROME: ICD-10-CM

## 2023-06-09 DIAGNOSIS — F51.04 CHRONIC INSOMNIA: ICD-10-CM

## 2023-06-09 RX ORDER — GABAPENTIN 600 MG/1
600 TABLET ORAL AT BEDTIME
Qty: 30 TABLET | Refills: 0 | Status: SHIPPED | OUTPATIENT
Start: 2023-06-09 | End: 2023-01-01

## 2023-06-09 NOTE — TELEPHONE ENCOUNTER
Reason for call:  Medication   If this is a refill request, has the caller requested the refill from the pharmacy already? Yes  Will the patient be using a [facility]? No  [facility]     Name of the medication requested: Gabapentin    Other request: Patient reports she is out and her pharmacy already sent over a request. Sent high priority.    Phone number to reach patient:  Home number on file [phone] (home)    Best Time:  ASAP    Can we leave a detailed message on this number?  YES    Travel screening: Not Applicable

## 2023-06-09 NOTE — TELEPHONE ENCOUNTER
Patient no showed her appointment with Dr. Ackerman on 05/04/2023. This request was already sent to PCP yesterday. Her appointment with PCP is on 06/15/2023. Routing to primary.    Ce Brown MA

## 2023-06-12 RX ORDER — GABAPENTIN 600 MG/1
TABLET ORAL
Qty: 24 TABLET | Refills: 0 | OUTPATIENT
Start: 2023-06-12

## 2023-06-12 NOTE — TELEPHONE ENCOUNTER
Last office visit: 7/15/2022     Future Office Visit:   Next 5 appointments (look out 90 days)      Daryn 15, 2023  3:40 PM  (Arrive by 3:20 PM)  Adult Preventative Visit with Nayeli Castorena PA-C  [facility] ([facility] ) [address] [place] [id number]  [phone]               CSA -- Patient Level:    CSA: None found at the patient level.             Routing refill request to provider for review/approval because:  Drug not on the [facility] refill protocol     Ryann Harrison RN, BSN  [facility] Triage

## 2023-06-16 DIAGNOSIS — B00.1 COLD SORE: ICD-10-CM

## 2023-06-19 RX ORDER — VALACYCLOVIR HYDROCHLORIDE 1 G/1
2000 TABLET, FILM COATED ORAL PRN
Qty: 4 TABLET | Refills: 0 | Status: SHIPPED | OUTPATIENT
Start: 2023-06-19 | End: 2023-01-01

## 2023-06-19 NOTE — TELEPHONE ENCOUNTER
Routing refill request to provider for review/approval because:   Antivirals for Herpes Protocol Failed 06/16/2023 06:51 PM   Protocol Details  Recent (12 mo) or future (30 days) visit within the authorizing provider's specialty    Normal serum creatinine on file in past 12 months        Ryann Harrison RN, BSN  [facility]

## 2023-07-05 NOTE — PROGRESS NOTES
[facility]    A/P                                                    ASSESSMENT/PLAN  Diagnoses and all orders for this visit:  Alcohol use disorder, severe, dependence (H)  -     naltrexone (DEPADE/REVIA) 50 MG tablet; Take 1 tablet (50 mg) by mouth daily Take 1/2 tablet for 3-5 days to avoid side-effects (most common are nausea, headache)  Chronic insomnia  -     doxepin (SINEQUAN) 10 MG capsule; Take 1 capsule (10 mg) by mouth At Bedtime  -     Adult Sleep Eval & Management Referral; Future  -     traZODone (DESYREL) 100 MG tablet; Take 1 tablet (100 mg) by mouth At Bedtime  -     gabapentin (NEURONTIN) 600 MG tablet; Take 1 tablet (600 mg) by mouth At Bedtime  Chronic pain syndrome  -     gabapentin (NEURONTIN) 600 MG tablet; Take 1 tablet (600 mg) by mouth At Bedtime    Orders Placed This Encounter   Medications    doxepin (SINEQUAN) 10 MG capsule     Sig: Take 1 capsule (10 mg) by mouth At Bedtime     Dispense:  30 capsule     Refill:  1    traZODone (DESYREL) 100 MG tablet     Sig: Take 1 tablet (100 mg) by mouth At Bedtime     Dispense:  30 tablet     Refill:  1    gabapentin (NEURONTIN) 600 MG tablet     Sig: Take 1 tablet (600 mg) by mouth At Bedtime     Dispense:  30 tablet     Refill:  1    naltrexone (DEPADE/REVIA) 50 MG tablet     Sig: Take 1 tablet (50 mg) by mouth daily Take 1/2 tablet for 3-5 days to avoid side-effects (most common are nausea, headache)     Dispense:  30 tablet     Refill:  1       Problem list updated Jul 5, 2023   No problems updated.      Jul 5, 2023  - Naltrexone for alcohol cravings  - OK to increase gabapentin to 1200mg between appt  - No refills without f/up scheduled  - Start doxepin to see if this can help, given her insomnia has been wearing on her MH  - Further discussions with PCP and sleep medicine regarding sleep therapies      Last HPI details  Jul 15, 2022  - No alcohol use since March after attending treatment in [place]. No cravings  "unless out on the boat  - Medication regimen simplified - refills as above  - New PCP soon  - No further SE from seroquel; will continue this given Linda's consistent understanding of the potential risks of metabolic syndrome      PDMP Review         Value Time User    State PDMP site checked  Yes 3/27/2023  1:46 PM Josey Borges, DO              RTC  Return in about 2 months (around 9/5/2023) for any visit type, please call to schedule.      Counseled the patient on the importance of having a recovery program in addition to medication to manage recovery.  Components include avoiding isolating, having willingness to change, avoiding triggers and managing cravings. Encouraged having some type of sober network and practicing honesty with trusted support person(s). Encouraged other services such as counseling, 12 step or other self-help organizations.          SUBJECTIVE                                                    Kavitha Headley is a 77 year old female who presents to clinic today for follow up    Visit performed Virtual, via video    Video-Visit Details    Type of service:  Video Visit    Video Start Time: 10:44 AM  Video End Time:  11:05 AM    Originating Location (pt. Location): Home    Distant Location (provider location):  [facility] office     Platform used for Video Visit: Oksana        PHQ-9 Score:       2/8/2022     3:35 PM 7/15/2022     3:34 PM 7/5/2023    10:35 AM   PHQ   PHQ-9 Total Score 4 3 5   Q9: Thoughts of better off dead/self-harm past 2 weeks Not at all Not at all Not at all       NITHIN-7 Score:      1/13/2022     1:17 PM 2/2/2022     1:34 PM 2/8/2022     3:35 PM   NITHIN-7 SCORE   Total Score  8 (mild anxiety)    Total Score 8 8 3             TODAY'S VISIT   HPI Jul 5, 2023  - Feels \"practically suicidal\" when she cannot sleep. Tries to exercise and garden regularly. Has many supplements she tries to take without success  - No alcohol use since March 2022. Used this partly " to help with sleep. Has cravings for alcohol when she is not sleeping. 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 Feels her MH is generally stable/positive but has low energy and gets down about her aches/pains at times. This rarely/never lasts all day. Stopped seroquel without concern d/t weight gain  - Attempted to take gabapentin 1200mg at night for sleep, which has been somewhat effective. Currently taking 600mg but isn't sure if it is helpful      OBJECTIVE                                                    PHYSICAL EXAM:  There were no vitals taken for this visit.    GENERAL: healthy, alert and no distress  EYES: Eyes grossly normal to inspection, PERRL and conjunctivae and sclerae normal  RESP: No respiratory distress  MENTAL STATUS EXAM  Appearance/Behavior: No appearant distress  Speech: Normal  Mood/Affect: normal affect  Insight: Adequate    LAB  No results found for any visits on 07/05/23.      HISTORY                                                    Problem list reviewed & adjusted, as indicated.  Patient Active Problem List   Diagnosis    Spinal stenosis    Benign essential hypertension    Chronic insomnia    Alcohol use disorder, severe, in early remission (H)    CKD (chronic kidney disease) stage 3, GFR 30-59 ml/min (H)    Knee joint replacement status    Chronic pain syndrome    Bariatric surgery status    Personal history of urinary calculi    Anxiety    Vitamin B12 deficiency (non anemic)    Liver disease, chronic, due to alcohol (H)    Coronary artery disease involving native coronary artery of native heart without angina pectoris    Mild ascending aorta dilatation (H)    Psoriasis - on Humira - Dr. Gauri Clark with dermatology    Thiamine deficiency    Cold sore    Gastroesophageal reflux disease with esophagitis - chronic due to alcohol    Elevated brain natriuretic peptide (BNP) level    Enlarged pulmonary artery (H)    Acute respiratory failure with  hypoxia (H) - 12/2021 - due to influenza A - persistent issues, ? underlying RAD - seeing [place] [facility] - Dr. Ventura    Chronic diastolic (congestive) heart failure (H)    Mild recurrent major depression (H)    Alcohol dependence (H)    Alcohol withdrawal syndrome without complication (H)    Non-traumatic rhabdomyolysis    Status post coronary angiogram    Aortic stenosis, severe         MEDICATION LIST (prior to visit)  acetaminophen (TYLENOL) 500 MG tablet, Take 1,000 mg by mouth 2 times daily as needed for pain  amoxicillin (AMOXIL) 500 MG capsule, TAKE 4 CAPSULES BY MOUTH 1 HOUR BEFORE APPOINTMENT  aspirin (ASA) 81 MG EC tablet, Take 81 mg by mouth daily  aspirin 81 MG EC tablet, Take 81 mg by mouth daily  bisacodyl (DULCOLAX) 5 MG EC tablet, Take 5 mg by mouth daily as needed for constipation  folic acid (FOLVITE) 1 MG tablet, Take 1 mg by mouth daily  MISC NATURAL PRODUCTS PO, Take 1 tablet by mouth daily *L-Tryptophan 1000 mg*  Multiple Vitamins-Minerals (CENTRUM SILVER) per tablet, Take 1 tablet by mouth daily  polyethylene glycol (MIRALAX) 17 GM/Dose powder, Take 17 g by mouth 2 times daily as needed for constipation  valACYclovir (VALTREX) 1000 mg tablet, TAKE 2 TABLETS (2,000 MG) BY MOUTH AS NEEDED (ONSET OF COLD SORE)  [DISCONTINUED] metoprolol succinate ER (TOPROL XL) 25 MG 24 hr tablet, Take 1 tablet (25 mg) by mouth daily    No current facility-administered medications on file prior to visit.      MEDICATION LIST (after visit)  Current Outpatient Medications   Medication    acetaminophen (TYLENOL) 500 MG tablet    amoxicillin (AMOXIL) 500 MG capsule    aspirin (ASA) 81 MG EC tablet    aspirin 81 MG EC tablet    bisacodyl (DULCOLAX) 5 MG EC tablet    doxepin (SINEQUAN) 10 MG capsule    folic acid (FOLVITE) 1 MG tablet    gabapentin (NEURONTIN) 600 MG tablet    MISC NATURAL PRODUCTS PO    Multiple Vitamins-Minerals (CENTRUM SILVER) per tablet    naltrexone (DEPADE/REVIA) 50 MG tablet    polyethylene glycol  (MIRALAX) 17 GM/Dose powder    traZODone (DESYREL) 100 MG tablet    valACYclovir (VALTREX) 1000 mg tablet     No current facility-administered medications for this visit.         Allergies   Allergen Reactions    Bactrim [Sulfamethoxazole-Trimethoprim] Hives    Morphine Itching     NAUSEA    Morphine And Related Itching     NAUSEA       Additional MDM Details:  none    Jin Ackerman MD  [facility]  [phone]

## 2023-07-05 NOTE — NURSING NOTE
Is the patient currently in the state of [place]? YES    Visit mode:VIDEO    If the visit is dropped, the patient can be reconnected by: TELEPHONE VISIT: Phone number: [phone]    Will anyone else be joining the visit? No  (If patient encounters technical issues they should call [phone])    How would you like to obtain your AVS? MyChart    Are changes needed to the allergy or medication list? NO only taking 2 of the listed meds    Rooming Documentation: Attendance Guidelines - Care team has reviewed attendance agreement with patient. Patient advised that two failed appointments within 6 months may lead to termination of current episode of care.      Reason for visit: ROSANNE Pak

## 2023-07-05 NOTE — PROGRESS NOTES
"Virtual Visit Details    Type of service:  Video Visit   Video Start Time: {video visit start/end time for provider to select:696712}  Video End Time:{video visit start/end time for provider to select:872365}    Originating Location (pt. Location): {video visit patient location:135477::\"Home\"}  {PROVIDER LOCATION On-site should be selected for visits conducted from your clinic location or adjoining [facility] hospital, academic office, or other nearby [facility] building. Off-site should be selected for all other provider locations, including home:810411}  Distant Location (provider location):  {virtual location provider:232462}  Platform used for Video Visit: {Virtual Visit Platforms:812207::\"[employer]\"}  "

## 2023-07-05 NOTE — PATIENT INSTRUCTIONS
Patient Education   Addiction Medicine  What to Expect  Here's what to expect from our Addiction Medicine program.  About Addiction Medicine  Addiction Medicine clinics help you with substance use problems. You set your own goals. We try to help you reach your goals. Your care plan can include:  Medicine  Creating a recovery plan  Helping you find local resources  Helping with treatment options  Clinic phone number and addresses  Clinic Phone: [phone]  Mental Health and Addiction Clinic  [facility]  [address], [address]  [place] [address]  [facility]  [address], [address]  [place] [address]  Walk-in services  We offer walk-in care for patients at the Recovery Clinic. This is only for patients with Opioid Use Disorder (OUD). Anyone with OUD is welcome. Our providers will refer you to the Recovery Clinic if you're struggling to keep up with your medicines or appointments.  Recovery Clinic ([facility])  [address], [address]  [place] [address]  Phone: [phone]  The Recovery Clinic is open for walk-ins Monday to Friday 9 a.m. to 11:30 a.m. and 12:30 p.m. to 3 p.m.  How it works  Come to your visits every time. The treatment works better when you do.   You can have as many visits as you need. When you're better, we'll refer you back to being cared for by your family doctor.   If you need it, we'll send you to doctors, psychiatrists, therapists, and other providers. We focus on treating addiction. We don't treat other problems, like managing other medicines or non-addiction issues.  About visits  Urine drug testing  We'll often test your pee (urine) for drugs. This is the only way we can know for sure whether or not you're using drugs. It helps us treat you without judgement.   Suboxone (buprenorphine)  If you're taking buprenorphine, you'll have a lot of visits at first. If your problem is getting worse, or you're  "using substances, we may schedule you for extra visits.   Cancelling visits  If you can't come to your visit, please call us right away at [phone]. If you don't cancel at least 24 hours (1 full day) before your visit, that's \"late cancellation.\"  Being late to visits  If you come late, you may not be seen. This will count as a \"no-show.\"  Please call the clinic if you're running late. This will help us plan, but it doesn't mean you'll be seen.   Being late is:  More than 15 minutes late for a return visit.  More than 30 minutes late for your first visit.  If you cancel late or don't show up 2 times within 6 months, we may transfer you to another clinic.   Getting help between visits  If you need help between visits, you can call us Monday to Friday from 8 a.m. to 4:30 p.m. at [phone]. You can also send us a message on [employer].  Medicine refills  If you miss or cancel a visit, you can still ask for a refill. But we can only refill your medicines if you've made a new appointment.  Please call your pharmacy for medicine refills. If you have a question about your refill, call us at [phone].  It takes up to 2 business days to refill your drugs. Let us know 2 to 3 days before you run out. Don't call more than 1 week before you run out. That's too early.   Please make sure we have your right phone number.  If we have a problem with your refill, we'll call you. If we call you, please call us back right away. If you don't, you may not get your medicines quickly.   Call your pharmacy to find out if your medicines are ready.   Keep your medicines in a safe place. Keep them away from pets and children. If your medicines are lost or stolen, we usually don't replace them. We recommend you file a police report if your medicines are stolen. Your insurance may not pay for early refills, even if you have a prescription.  Forms  Please give us at least 3 business days to fill out any forms. Bring the forms to your " visits if you can. We may refer you to other members of your care team to complete the forms.   Emergency care   Call 911 or go to the nearest emergency room if your life or someone else's life is in danger.  Call 988 anytime for the Suicide and Crisis Lifeline.  If you need care when we're closed, call your family doctor to see if they can help. You can also go to urgent care or an emergency room. [facility] emergency rooms may be able to give you buprenorphine or other medicine refills.  Thank you for choosing us for your care.  For informational purposes only. Not to replace the advice of your health care provider. Copyright   2023 [facility]. All rights reserved. [employer] [id number] - REV 05/23.

## 2023-07-17 NOTE — TELEPHONE ENCOUNTER
Reason for Call:  Other prescription    Detailed comments: Pt left vm at [facility] requesting call back. Wanted to check in about medications and concerns.     Phone Number Patient can be reached at: Cell number on file:    Telephone Information:   Mobile [phone]       Best Time: Any  Can we leave a detailed message on this number? YES    Call taken on 7/17/2023 at 11:32 AM by Chante Worley

## 2023-07-17 NOTE — TELEPHONE ENCOUNTER
Returned phone call to patient.    Pt reports that the doxepin she was started on for insomnia is helping her fall asleep, but she wakes up 2.5 hours after taking it.    Reports that she ends up taking another 10 mg capsule after 2.5 hours because she's unable to fall back to sleep without it and then is able to sleep another 2.5 hours.    Pt requesting that her prescription is written to be able to take two 10 mg capsules.    Let pt know that this would be routed to Dr. Ackerman and RN would be calling her back.

## 2023-07-18 NOTE — TELEPHONE ENCOUNTER
Called and let pt know that Dr. Ackerman okayed dose increase to two 10 mg capsules of doxepin and sent new prescription into pharmacy.    Pt very appreciative of dose increase and getting back to her regarding this.

## 2023-08-10 NOTE — TELEPHONE ENCOUNTER
Reason for Call:  Other appointment and prescription    Detailed comments: Informed pt of need to reschedule video visit  9/7/23 as provider will not be office. Rescheduled for video 9/21/22 at 1:30 pm. Pt also requesting refill of trazodone. Has a couple days left. Takes it along with new psych med to help with sleep.    Pharmacy:   [id number] [place] - [address] AT [place] [address]       Phone Number Patient can be reached at: Cell number on file:    Telephone Information:   Mobile [phone]       Best Time: Any    Can we leave a detailed message on this number? YES    Call taken on 8/10/2023 at 1:23 PM by Chante Worley

## 2023-08-10 NOTE — TELEPHONE ENCOUNTER
Appears pt has a refill on file for trazodone 100 mg tab.    Called [facility] to confirm. They confirmed they have refill on file and will fill for patient.    Called pt to let her know.    Pt verbalized appreciation for phone call.

## 2023-08-31 NOTE — TELEPHONE ENCOUNTER
Date of Last Office Visit: 7/5/2023  Date of Next Office Visit: 9/21/2023  No shows since last visit: None  Cancellations since last visit: 9/7/2023 (provider initiated)    Medication requested: gabapentin 600 mg tab Date last ordered: 7/5/2023 Qty: 30 Refills: 1     [place] of [place] ?: Yes  Medication last sold date: 8/7/2023 Qty filled: 20  Other controlled substance on [place] ?: Yes  If yes, is this a new medication?: Yes  If yes, name of medication: Belsomra 20 Mg Tab and date filled: 8/10/2023  If yes, name of medication: Zolpidem Tart Er 6.25 Mg Tab and date filled: 8/22/2023    Lapse in medication adherence greater than 5 days?: No  If yes, call patient and gather details: N/A  Medication refill request verified as identical to current order?: No refill provided  Result of Last DAM, VPA, Li+ Level, CBC, or Carbamazepine Level (at or since last visit): N/A    Last visit treatment plan:   ASSESSMENT/PLAN  Diagnoses and all orders for this visit:  Alcohol use disorder, severe, dependence (H)  -     naltrexone (DEPADE/REVIA) 50 MG tablet; Take 1 tablet (50 mg) by mouth daily Take 1/2 tablet for 3-5 days to avoid side-effects (most common are nausea, headache)  Chronic insomnia  -     doxepin (SINEQUAN) 10 MG capsule; Take 1 capsule (10 mg) by mouth At Bedtime  -     Adult Sleep Eval & Management Referral; Future  -     traZODone (DESYREL) 100 MG tablet; Take 1 tablet (100 mg) by mouth At Bedtime  -     gabapentin (NEURONTIN) 600 MG tablet; Take 1 tablet (600 mg) by mouth At Bedtime  Chronic pain syndrome  -     gabapentin (NEURONTIN) 600 MG tablet; Take 1 tablet (600 mg) by mouth At Bedtime          Orders Placed This Encounter   Medications    doxepin (SINEQUAN) 10 MG capsule       Sig: Take 1 capsule (10 mg) by mouth At Bedtime       Dispense:  30 capsule       Refill:  1    traZODone (DESYREL) 100 MG tablet       Sig: Take 1 tablet (100 mg) by mouth At Bedtime       Dispense:  30 tablet       Refill:  1     gabapentin (NEURONTIN) 600 MG tablet       Sig: Take 1 tablet (600 mg) by mouth At Bedtime       Dispense:  30 tablet       Refill:  1    naltrexone (DEPADE/REVIA) 50 MG tablet       Sig: Take 1 tablet (50 mg) by mouth daily Take 1/2 tablet for 3-5 days to avoid side-effects (most common are nausea, headache)       Dispense:  30 tablet       Refill:  1         Problem list updated Jul 5, 2023   No problems updated.        Jul 5, 2023  - Naltrexone for alcohol cravings  - OK to increase gabapentin to 1200mg between appt  - No refills without f/up scheduled  - Start doxepin to see if this can help, given her insomnia has been wearing on her MH  - Further discussions with PCP and sleep medicine regarding sleep therapies        Last HPI details  Jul 15, 2022  - No alcohol use since March after attending treatment in [place]. 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  - Medication regimen simplified - refills as above  - New PCP soon  - No further SE from seroquel; will continue this given Linda's consistent understanding of the potential risks of metabolic syndrome        PDMP Review           Value Time User     State PDMP site checked  Yes 3/27/2023  1:46 PM Josey Borges, DO               RTC  Return in about 2 months (around 9/5/2023) for any visit type, please call to schedule.    [x]Medication refilled per  Medication Refill in Ambulatory Care  policy.  []Medication unable to be refilled by RN due to criteria not met as indicated below:    []Eligibility - not seen in the last year   []Supervision - no future appointment   []Compliance - no shows, cancellations or lapse in therapy   []Verification - order discrepancy   []Controlled medication   []Medication not included in policy   []90-day supply request   []Other

## 2023-08-31 NOTE — PROGRESS NOTES
Cardiology Clinic Progress Note  Kavitha Headley MRN# [id number]   YOB: 1945 Age: 77 year old     Primary cardiologist: Dr. Ugarte     Reason for visit: s/p TAVR    History of presenting illness:    Kavitha Headley is a pleasant 77 year old patient with past medical history significant for mildly dilated ascending aorta measuring 4.1 cm, paroxysmal SVT and PACs, hypertension, hyperlipidemia, obesity s/p gastric bypass surgery, chronic diastolic heart failure, substance use disorder, and severe aortic stenosis s/p TAVR with 23 mm Payne madyson 3 valve (8/9/2022), who presents today for follow-up.     Linda continues to do well since her TAVR procedure.  She has some mild dyspnea on exertion when she walks up the hill to get her mail.  She is admittedly not very active, although is tolerating most activity without any issue.  She is also currently undergoing sleep study for evaluation of sleep apnea.  She otherwise denies chest pain, syncope or near syncope, or palpitations.  She has no PND or orthopnea.    Her blood pressure is elevated today on multiple checks with systolic readings above 150.    I reviewed her most recent echocardiogram that shows well-seated bioprosthetic aortic valve with a mean gradient of 18 mmHg, no AI, and preserved LV systolic function.  Her ascending aorta remains stable measuring 4.2 cm.  EKG today shows normal sinus rhythm with a first-degree AV block.    Pre-TAVR coronary angiogram in 6/2022 showed nonobstructive coronary disease.    Labs today demonstrate creatinine of 1.02, GFR 56.  Her CBC shows hgb 10.6 and platelets of 285.         Assessment and Plan:     ASSESSMENT:    Severe aortic stenosis status post TAVR with 23 mm S3 valve on 8/9/22.  Her most recent echo is outlined above, MG is 18 mmHg, 14 mmHg a year ago. She reports some mild dyspnea on exertion, though is not very active. Her LV function is normal. Remains on ASA 81 mg daily for antiplatelet therapy.    Hypertension.  Suboptimally controlled, not on any antihypertensive medications.  Hyperlipidemia. LDL 85 on pravastatin 20 mg daily.   Chronic diastolic heart failure, NYHA class I. Appears compensated on exam, not on diuretic therapy.   Mildly dilated ascending aorta measuring 4.2 cm. Stable on most recent echo.   Obesity s/p gastric bypass. Has gained some weight over the past year or so, likely contributing to her dyspnea and worsening blood pressure.   Probable sleep apnea.  Being evaluated by sleep medicine.   History of substance use disorder.  S/p multiple rehab admissions, remains sober.       PLAN:     We discussed the importance of daily aerobic exercise and weight loss.   Recommend starting lisinopril-hydrochlorothiazide 19-12.5 mg daily for blood pressure.   She will start checking her blood pressure about 2 hours after her medications and let us know if her blood pressures consistently greater than 140/80.  She will need on antibiotic prophylaxis prior to any dental procedure.  Repeat BMP in 1 week.   Repeat echo in 6 months, as planned.   Follow-up with general cardiology team in 6 months, sooner if needed.        Ember Bush, RAFAEL, APRN, CNP  Page: [phone] (8a-5p M-F)    Orders this Visit:  Orders Placed This Encounter   Procedures    Basic metabolic panel     Orders Placed This Encounter   Medications    lisinopril-hydrochlorothiazide (ZESTORETIC) 10-12.5 MG tablet     Sig: Take 1 tablet by mouth daily     Dispense:  90 tablet     Refill:  0     There are no discontinued medications.    Today's clinic visit entailed:  Review of the result(s) of each unique test - echo, EKG, labs  Ordering of each unique test  Prescription drug management  30 minutes spent on the date of the encounter doing chart review, review of test results, patient visit and documentation   Provider  Link to [facility] Help Grid     The level of medical decision making during this visit was of moderate complexity.            "Review of Systems:     Review of Systems:  Skin:        Eyes:       ENT:       Respiratory:       Cardiovascular:       Gastroenterology:      Genitourinary:       Musculoskeletal:       Neurologic:       Psychiatric:       Heme/Lymph/Imm:       Endocrine:                 Physical Exam:   Vitals: BP (!) 152/90   Pulse 92   Ht 1.626 m (5' 4\")   Wt 83.5 kg (184 lb)   SpO2 97%   BMI 31.58 kg/m    Constitutional:  cooperative        Skin:  warm and dry to the touch        Head:  normocephalic        Eyes:  pupils equal and round        ENT:           Neck:  JVP normal        Chest:  normal breath sounds, clear to auscultation, normal A-P diameter, normal symmetry, normal respiratory excursion, no use of accessory muscles        Cardiac: regular rhythm;normal S1 and S2       systolic ejection murmur;grade 2          Abdomen:  abdomen soft        Vascular: pulses full and equal                                      Extremities and Back:  no edema        Neurological:  no gross motor deficits;affect appropriate             Medications:     Current Outpatient Medications   Medication Sig Dispense Refill    acetaminophen (TYLENOL) 500 MG tablet Take 1,000 mg by mouth 2 times daily as needed for pain      amoxicillin (AMOXIL) 500 MG capsule TAKE 4 CAPSULES BY MOUTH 1 HOUR BEFORE APPOINTMENT      aspirin (ASA) 81 MG EC tablet Take 81 mg by mouth daily      aspirin 81 MG EC tablet Take 81 mg by mouth daily      bisacodyl (DULCOLAX) 5 MG EC tablet Take 5 mg by mouth daily as needed for constipation      doxepin (SINEQUAN) 10 MG capsule Take 2 capsules (20 mg) by mouth At Bedtime 60 capsule 1    folic acid (FOLVITE) 1 MG tablet Take 1 mg by mouth daily      gabapentin (NEURONTIN) 600 MG tablet Take 1 tablet (600 mg) by mouth At Bedtime 30 tablet 1    lisinopril-hydrochlorothiazide (ZESTORETIC) 10-12.5 MG tablet Take 1 tablet by mouth daily 90 tablet 0    MISC NATURAL PRODUCTS PO Take 1 tablet by mouth daily *L-Tryptophan 1000 " mg*      Multiple Vitamins-Minerals (CENTRUM SILVER) per tablet Take 1 tablet by mouth daily      naltrexone (DEPADE/REVIA) 50 MG tablet Take 1 tablet (50 mg) by mouth daily Take 1/2 tablet for 3-5 days to avoid side-effects (most common are nausea, headache) 30 tablet 1    polyethylene glycol (MIRALAX) 17 GM/Dose powder Take 17 g by mouth 2 times daily as needed for constipation      traZODone (DESYREL) 100 MG tablet Take 1 tablet (100 mg) by mouth At Bedtime 30 tablet 1    valACYclovir (VALTREX) 1000 mg tablet TAKE 2 TABLETS (2,000 MG) BY MOUTH AS NEEDED (ONSET OF COLD SORE) 4 tablet 0       Family History   Problem Relation Age of Onset    Substance Abuse Father     Cancer Father         throat and lung mets    Diabetes No family hx of     Coronary Artery Disease No family hx of     Cerebrovascular Disease No family hx of        Social History     Socioeconomic History    Marital status:      Spouse name: Mt    Number of children: 4    Years of education: 18    Highest education level: Not on file   Occupational History    Occupation: nurse     Employer: [employer]     Employer: RETIRED   Tobacco Use    Smoking status: Never    Smokeless tobacco: Never   Substance and Sexual Activity    Alcohol use: Not Currently     Alcohol/week: 63.0 standard drinks of alcohol     Types: 63 Standard drinks or equivalent per week    Drug use: No    Sexual activity: Yes     Partners: Male   Other Topics Concern     Service Not Asked    Blood Transfusions No    Caffeine Concern Yes     Comment: 1-2 cups per day     Occupational Exposure Yes     Comment: blood    Hobby Hazards No    Sleep Concern Yes    Stress Concern Yes    Weight Concern Yes     Comment: gastric  byepass    Special Diet No    Back Care No    Exercise Yes     Comment: walk, swin    Bike Helmet No    Seat Belt Yes    Self-Exams Yes    Parent/sibling w/ CABG, MI or angioplasty before 65F 55M? Not Asked   Social History Narrative    Not on file      Social Determinants of Health     Financial Resource Strain: Not on file   Food Insecurity: Not on file   Transportation Needs: Not on file   Physical Activity: Not on file   Stress: Not on file   Social Connections: Not on file   Intimate Partner Violence: Not on file   Housing Stability: Not on file            Past Medical History:     Past Medical History:   Diagnosis Date    Alcohol abuse     Anxiety disorder     Ascending aorta dilatation (H)     Bariatric surgery status 1996?    gastric bypass, [facility] and    Benign hypertension     Chronic insomnia     Chronic pain syndrome     Chronic back and neck pain, chronic pain due to osteoarthritis multiple joints    Coronary artery disease involving native coronary artery of native heart without angina pectoris 10/16/2018    Minimal coronary artery disease on coronary angiogram in 2015.     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    Major depressive disorder, single episode, severe, without mention of psychotic behavior     Mixed hyperlipidemia     Pelvic relaxation disorder     Surgical intervention for cystocele/rectocele 3,11/2012    Personal history of urinary calculi 06/2006    left ureteral stone,lithotripsy    Psoriasis     Spinal stenosis     Stage III chronic kidney disease (H) 2005              Past Surgical History:     Past Surgical History:   Procedure Laterality Date    APPENDECTOMY  3/2004    incidental    ARTHRODESIS TOE(S) Right 1/31/2020    Procedure: RIGHT FIRST METATARSAL PHALANGEAL JOINT ARTHRODESIS;  Surgeon: Steven Reyes MD;  Location: [facility] OR    C MEDIASTINOSCOPY W OR WO BIOPSY  2/2008    Videomediastinoscopy and, for mediastinal adenopathy -reactive lymphoid hyperplasia    CARPAL TUNNEL  RELEASE RT/LT  10/2010    Carpometacarpal excisional arthroplasty with a fascial autograft and APL suspension sling (85094). 2. Left thumb metacarpophalangeal joint fusion with autologous bone graft (04149). 3. Left endoscopic carpal tunnel release     CHOLECYSTECTOMY, LAPOROSCOPIC  11/2010    Cholecystectomy, Laparoscopic    COLONOSCOPY N/A 9/8/2016    Procedure: COMBINED COLONOSCOPY, SINGLE OR MULTIPLE BIOPSY/POLYPECTOMY BY BIOPSY;  Surgeon: Moe Barlow MD;  Location:  GI    CV CORONARY ANGIOGRAM N/A 6/20/2022    Procedure: Coronary Angiogram;  Surgeon: Nora Parker MD;  Location:  HEART CARDIAC CATH LAB    CV PCI N/A 6/20/2022    Procedure: Percutaneous Coronary Intervention;  Surgeon: Nora Parker MD;  Location:  HEART CARDIAC CATH LAB    CV RIGHT HEART CATH MEASUREMENTS RECORDED N/A 6/20/2022    Procedure: Right Heart Catheterization;  Surgeon: Nora Parker MD;  [facility]    CV TRANSCATHETER AORTIC VALVE REPLACEMENT-FEMORAL APPROACH N/A 8/9/2022    Procedure: Transcatheter Aortic Valve Replacement-Femoral Approach;  Surgeon: Nora Parker MD;  [facility]    CYSTOCELE REPAIR  11/2012    davinci laparoscopic sacrocolpopexy, enterocele repair, lysis of adhesions, placement of retropubic mid urethral sling, cystoscopy    CYSTOSCOPY, LITHOTRIPSY, COMBINED  6/2006    Left extracorporeal shock wave lithotripsy, cystoscopy, left ureteral stent placement.    CYSTOSCOPY, REMOVE STENT(S), COMBINED  7/2006    Cystoscopy, removal of left ureteral stent, retrograde pyelography, flexible and rigid ureteroscopy and holmium laser lithotripsy, basket removal of stone fragments, ureteral stent placement.     ENDOSCOPIC ULTRASOUND UPPER GASTROINTESTINAL TRACT (GI) N/A 6/12/2017    Procedure: ENDOSCOPIC ULTRASOUND, ESOPHAGOSCOPY / UPPER GASTROINTESTINAL TRACT (GI);  ENDOSCOPIC ULTRASOUND, ESOPHAGOSCOPY / UPPER GASTROINTESTINAL TRACT (GI);  Surgeon:  Parth Graham MD;  Location:  GI    HERNIA REPAIR  4/2012    bilateral augmentation mastopexy, ventral hernia repair, and medial thigh liposuction on 04/06/2012.     HYSTERECTOMY VAGINAL, BILATERAL SALPINGO-OOPHERECTOMY, COMBINED  1998    due to myoma and bleeding    JOINT REPLACEMENT, HIP RT/LT  4/2004    right total hip arthroplasty    LAPAROTOMY, LYSIS ADHESIONS, COMBINED  3/2004    lysis adhesions, ventral hernia repair, appendectomy incidentally    LYMPH NODE BIOPSY  4/2008    right axillary, reactive follicular and paracortical hyperplasia.    MAMMOPLASTY AUGMENTATION BILATERAL  4/2012    REPAIR HAMMER TOE Right 1/31/2020    Procedure: WITH SECOND AND THIRD CLAW TOE RECONSTRUCTION;  Surgeon: Steven Reyes MD;  Location:  OR    REVISE RECONSTRUCTED BREAST  6/7/2012    Left breast capsulotomy.     ZZC GASTRIC BYPASS,OBESE<100CM ARIANNA-EN-Y  1996    ZZC REPAIR OF RECTOCELE  3/2012    ZZC TOTAL KNEE ARTHROPLASTY  12/2005    left               Allergies:   Bactrim [sulfamethoxazole-trimethoprim], Morphine, and Morphine and related       Data:   All laboratory data reviewed:    Recent Labs   Lab Test 08/02/22  1640 03/24/22  0721 10/20/21  1251 03/03/21  1612 02/12/20  0534 06/26/19  1012 08/04/18  0900 02/05/18  1252   LDL  --   --  85 145* 218*   < >  --   --    HDL  --   --  86 140 81   < >  --   --    NHDL  --   --  132* 160* 236*   < >  --   --    CHOL  --   --  218* 300* 317*   < >  --   --    TRIG  --   --  234* 77 91   < >  --   --    TSH  --   --   --  1.05  --   --  3.05 1.40   NTBNP 1,188  --   --   --   --   --   --   --    IRON  --  40  --   --   --   --   --   --    FEB  --  274  --   --   --   --   --   --    IRONSAT  --  15  --   --   --   --   --   --    CHUCK  --  29  --  16  --    < >  --   --     < > = values in this interval not displayed.       Lab Results   Component Value Date    WBC 6.9 08/31/2023    WBC 5.6 03/27/2021    RBC 3.58 (L) 08/31/2023    RBC 3.64 (L) 03/27/2021     HGB 10.6 (L) 08/31/2023    HGB 10.7 (L) 03/27/2021    HCT 33.6 (L) 08/31/2023    HCT 33.4 (L) 03/27/2021    MCV 94 08/31/2023    MCV 92 03/27/2021    MCH 29.6 08/31/2023    MCH 29.4 03/27/2021    MCHC 31.5 08/31/2023    MCHC 32.0 03/27/2021    RDW 15.6 (H) 08/31/2023    RDW 15.7 (H) 03/27/2021     08/31/2023     03/27/2021       Lab Results   Component Value Date     08/31/2023     05/12/2021    POTASSIUM 4.6 08/31/2023    POTASSIUM 4.3 09/21/2022    POTASSIUM 4.0 05/12/2021    CHLORIDE 101 08/31/2023    CHLORIDE 101 09/21/2022    CHLORIDE 107 05/12/2021    CO2 27 08/31/2023    CO2 30 09/21/2022    CO2 30 05/12/2021    ANIONGAP 12 08/31/2023    ANIONGAP 6 09/21/2022    ANIONGAP 3 05/12/2021     (H) 08/31/2023     (H) 09/21/2022     (H) 05/12/2021    BUN 6.8 (L) 08/31/2023    BUN 18 09/21/2022    BUN 15 05/12/2021    CR 1.02 (H) 08/31/2023    CR 1.00 05/12/2021    GFRESTIMATED 56 (L) 08/31/2023    GFRESTIMATED 54 (L) 05/12/2021    GFRESTBLACK 63 05/12/2021    BIRGIT 9.4 08/31/2023    BIRGIT 9.4 05/12/2021      Lab Results   Component Value Date    AST 41 03/10/2022    AST 20 03/30/2021    ALT 23 03/10/2022    ALT 16 03/30/2021       Lab Results   Component Value Date    A1C 5.7 09/29/2010       Lab Results   Component Value Date    INR 0.95 08/02/2022    INR 0.98 06/20/2022    INR 0.9 03/30/2021    INR 1.03 06/16/2017

## 2023-08-31 NOTE — PATIENT INSTRUCTIONS
Today's Plan:     - Start lisinopril-hydrochlorothiazide 10-12.5 mg daily.   - Start taking blood pressure about 2 hours after medication, goal is less than 140/80.   - Check labs in 1 week (BMP).   - Follow-up with Dr. Ugarte in 6 months, sooner if needed.     Alie RN ([phone]), Caterina RN ([phone]), and Rachel RN ([phone])    Scheduling phone number: [phone]    Reminder: Please bring in all current medications, over the counter supplements and vitamin bottles to your next appointment.-    It was a pleasure seeing you today!     Ember Bush, ADITHYA  8/31/2023    -

## 2023-09-05 NOTE — PROGRESS NOTES
[facility] Results:   1a. 5  1b. 3  1c. 2  2. 3  3. 5  4. 5  5. 3  6. 4  7. 3  8a. 5  8b. 5  8c. 5      Alie Smith RN  Structural Heart Coordinator  [facility]

## 2023-09-05 NOTE — TELEPHONE ENCOUNTER
Date of Last Office Visit: 7/5/2023  Date of Next Office Visit: 9/21/2023  No shows since last visit: None  Cancellations since last visit: 9/7/2023 (provider initiated)    Medication requested: trazodone 100 mg tab Date last ordered: 7/5/2023 Qty: 30 Refills: 1     Review of MN ?: N/A    Lapse in medication adherence greater than 5 days?: No  If yes, call patient and gather details: N/A  Medication refill request verified as identical to current order?: Yes  Result of Last DAM, VPA, Li+ Level, CBC, or Carbamazepine Level (at or since last visit): N/A    Last visit treatment plan:   ASSESSMENT/PLAN  Diagnoses and all orders for this visit:  Alcohol use disorder, severe, dependence (H)  -     naltrexone (DEPADE/REVIA) 50 MG tablet; Take 1 tablet (50 mg) by mouth daily Take 1/2 tablet for 3-5 days to avoid side-effects (most common are nausea, headache)  Chronic insomnia  -     doxepin (SINEQUAN) 10 MG capsule; Take 1 capsule (10 mg) by mouth At Bedtime  -     Adult Sleep Eval & Management Referral; Future  -     traZODone (DESYREL) 100 MG tablet; Take 1 tablet (100 mg) by mouth At Bedtime  -     gabapentin (NEURONTIN) 600 MG tablet; Take 1 tablet (600 mg) by mouth At Bedtime  Chronic pain syndrome  -     gabapentin (NEURONTIN) 600 MG tablet; Take 1 tablet (600 mg) by mouth At Bedtime          Orders Placed This Encounter   Medications    doxepin (SINEQUAN) 10 MG capsule       Sig: Take 1 capsule (10 mg) by mouth At Bedtime       Dispense:  30 capsule       Refill:  1    traZODone (DESYREL) 100 MG tablet       Sig: Take 1 tablet (100 mg) by mouth At Bedtime       Dispense:  30 tablet       Refill:  1    gabapentin (NEURONTIN) 600 MG tablet       Sig: Take 1 tablet (600 mg) by mouth At Bedtime       Dispense:  30 tablet       Refill:  1    naltrexone (DEPADE/REVIA) 50 MG tablet       Sig: Take 1 tablet (50 mg) by mouth daily Take 1/2 tablet for 3-5 days to avoid side-effects (most common are nausea, headache)        Dispense:  30 tablet       Refill:  1         Problem list updated Jul 5, 2023   No problems updated.        Jul 5, 2023  - Naltrexone for alcohol cravings  - OK to increase gabapentin to 1200mg between appt  - No refills without f/up scheduled  - Start doxepin to see if this can help, given her insomnia has been wearing on her MH  - Further discussions with PCP and sleep medicine regarding sleep therapies        Last HPI details  Jul 15, 2022  - No alcohol use since March after attending treatment in [place]. 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    [x]Medication refilled per  Medication Refill in Ambulatory Care  policy.  []Medication unable to be refilled by RN due to criteria not met as indicated below:    []Eligibility - not seen in the last year   []Supervision - no future appointment   []Compliance - no shows, cancellations or lapse in therapy   []Verification - order discrepancy   []Controlled medication   []Medication not included in policy   []90-day supply request   []Other

## 2023-09-08 NOTE — TELEPHONE ENCOUNTER
Date of Last Office Visit: 7/5/2023  Date of Next Office Visit: 9/21/2023  No shows since last visit: None  Cancellations since last visit: 9/7/2023 (provider initiated)    Medication requested: doxepin 10 mg cap Date last ordered: 7/17/2023 Qty: 60 Refills: 1     Review of MN ?: N/A    Lapse in medication adherence greater than 5 days?: No  If yes, call patient and gather details: N/A  Medication refill request verified as identical to current order?: Yes  Result of Last DAM, VPA, Li+ Level, CBC, or Carbamazepine Level (at or since last visit): N/A    Last visit treatment plan:   ASSESSMENT/PLAN  Diagnoses and all orders for this visit:  Alcohol use disorder, severe, dependence (H)  -     naltrexone (DEPADE/REVIA) 50 MG tablet; Take 1 tablet (50 mg) by mouth daily Take 1/2 tablet for 3-5 days to avoid side-effects (most common are nausea, headache)  Chronic insomnia  -     doxepin (SINEQUAN) 10 MG capsule; Take 1 capsule (10 mg) by mouth At Bedtime  -     Adult Sleep Eval & Management Referral; Future  -     traZODone (DESYREL) 100 MG tablet; Take 1 tablet (100 mg) by mouth At Bedtime  -     gabapentin (NEURONTIN) 600 MG tablet; Take 1 tablet (600 mg) by mouth At Bedtime  Chronic pain syndrome  -     gabapentin (NEURONTIN) 600 MG tablet; Take 1 tablet (600 mg) by mouth At Bedtime          Orders Placed This Encounter   Medications    doxepin (SINEQUAN) 10 MG capsule       Sig: Take 1 capsule (10 mg) by mouth At Bedtime       Dispense:  30 capsule       Refill:  1    traZODone (DESYREL) 100 MG tablet       Sig: Take 1 tablet (100 mg) by mouth At Bedtime       Dispense:  30 tablet       Refill:  1    gabapentin (NEURONTIN) 600 MG tablet       Sig: Take 1 tablet (600 mg) by mouth At Bedtime       Dispense:  30 tablet       Refill:  1    naltrexone (DEPADE/REVIA) 50 MG tablet       Sig: Take 1 tablet (50 mg) by mouth daily Take 1/2 tablet for 3-5 days to avoid side-effects (most common are nausea, headache)        Dispense:  30 tablet       Refill:  1         Problem list updated Jul 5, 2023   No problems updated.        Jul 5, 2023  - Naltrexone for alcohol cravings  - OK to increase gabapentin to 1200mg between appt  - No refills without f/up scheduled  - Start doxepin to see if this can help, given her insomnia has been wearing on her MH  - Further discussions with PCP and sleep medicine regarding sleep therapies        Last HPI details  Jul 15, 2022  - No alcohol use since March after attending treatment in [place]. 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    [x]Medication refilled per  Medication Refill in Ambulatory Care  policy.  []Medication unable to be refilled by RN due to criteria not met as indicated below:    []Eligibility - not seen in the last year   []Supervision - no future appointment   []Compliance - no shows, cancellations or lapse in therapy   []Verification - order discrepancy   []Controlled medication   []Medication not included in policy   []90-day supply request   []Other

## 2023-09-12 NOTE — PROGRESS NOTES
Linda is a 77 year old who is being evaluated via a billable video visit.      How would you like to obtain your AVS? MyChart  If the video visit is dropped, the invitation should be resent by: Text to cell phone: [phone]  Will anyone else be joining your video visit? No          Assessment & Plan     Obesity, Class I, BMI 30.0-34.9 (see actual BMI)  Consulted with [facility] pharmacy and in light of the patient's numerous comorbidities I do not feel comfortable prescribing a GLP-1 medication that requires regular follow-up and the long-term side effects/implications are essentially unknown at this point in time.  If the patient would like to discuss medication management would refer to the medical weight loss team for further diagnosis and treatment.  Thyroid function to rule out underlying metabolic etiology.  Recommend starting with a dietary program such as weight watchers as the patient does not appear to have tried anything in the recent past.  Patient voiced understanding and agreement.  - TSH with free T4 reflex  - Adult Comprehensive Weight Management  Referral    Anemia, unspecified type  Likely postoperative blood loss.  Recheck to ensure normalization.  - CBC with platelets    Acute respiratory failure with hypoxia (H) - 12/2021 - due to influenza A - persistent issues, ? underlying RAD - seeing [facility] - Dr. Ventura  Overall doing quite well with regard to breathing after cardiac surgery.    Stage 3a chronic kidney disease (H)  Stable.  Labs for surveillance.  Optimizing blood pressure imperative.  - Comprehensive metabolic panel (BMP + Alb, Alk Phos, ALT, AST, Total. Bili, TP)    Mild recurrent major depression (H)  Alcohol use disorder, severe, in early remission (H)  Alcohol dependence in remission (H)  Stable and managed by addiction management.  Applauded continued sobriety.  Encouraged continuation.  - Vitamin B12  - Folate    Enlarged pulmonary artery (H)  Mild ascending aorta dilatation  "(H)  Chronic diastolic (congestive) heart failure (H)  Benign essential hypertension  Coronary artery disease involving native coronary artery of native heart without angina pectoris  History of aortic valve replacement - Severe aortic stenosis status post TAVR with 23 mm S3 valve on 8/9/22  Liver disease, chronic, due to alcohol (H)  Managed by cardiology.  Doing well postoperatively.  - Comprehensive metabolic panel (BMP + Alb, Alk Phos, ALT, AST, Total. Bili, TP)  - Lipid panel reflex to direct LDL Fasting    Gastroesophageal reflux disease with esophagitis - chronic due to alcohol  Stable.  Continue current regimen.      Discussion of management or test interpretation with external physician/other qualified healthcare professional/appropriate source - [facility] pharmacy  41 minutes spent by me on the date of the encounter doing chart review, history and exam, documentation and further activities per the note     BMI:   Estimated body mass index is 31.58 kg/m  as calculated from the following:    Height as of 8/31/23: 1.626 m (5' 4\").    Weight as of 8/31/23: 83.5 kg (184 lb).   Weight management plan: deferred    Return in about 9 days (around 9/21/2023) for Dr. Ackerman.    Nayeli Castorena PA-C  [facility]   Linda is a 77 year old, presenting for the following health issues:  Weight loss medication        9/12/2023     3:12 PM   Additional Questions   Roomed by Linda LYNCH   Accompanied by Self       History of Present Illness       Reason for visit:  Weight loss medication, expand the blood work that I need to have done since I started on lisinopril, upcoming appointment for general physical. Would like to have further bloodwork done before my physical appointment with you    She eats 2-3 servings of fruits and vegetables daily.She consumes 1 sweetened beverage(s) daily.She exercises with enough effort to increase her heart rate 30 to 60 minutes per day.  She exercises with enough " effort to increase her heart rate 3 or less days per week.   She is taking medications regularly.     Severe aortic stenosis status post TAVR with 23 mm S3 valve on 8/9/22.  Her most recent echo is outlined above, MG is 18 mmHg, 14 mmHg a year ago. She reports some mild dyspnea on exertion, though is not very active. Her LV function is normal. Remains on ASA 81 mg daily for antiplatelet therapy.   Hypertension.  Started on lisinopril hydrochlorothiazide 10/12.5 mg 8/31/2023 by cardiology Ember Bush CNP.  Reports that blood pressure has been well controlled with her home readings on this medication.  Hyperlipidemia. LDL 85 on pravastatin 20 mg daily.   Chronic diastolic heart failure, NYHA class I. Appears compensated on exam, not on diuretic therapy.   Mildly dilated ascending aorta measuring 4.2 cm. Stable on most recent echo.   Obesity s/p gastric bypass. Has gained some weight over the past year or so, likely contributing to her dyspnea and worsening blood pressure.  Patient inquiring about medications like Wegovy or other GLP-1 medications for weight loss.  Has had gastric bypass many years ago.  Has not tried any diet or exercise programs.  Probable sleep apnea.  Being evaluated by sleep medicine.   History of substance use disorder.  S/p multiple rehab admissions, reports continued sobriety.  Last treatment March 2022 in [place].          Review of Systems   Constitutional, HEENT, cardiovascular, pulmonary, GI, , musculoskeletal, neuro, skin, endocrine and psych systems are negative, except as otherwise noted.      Objective           Vitals:  No vitals were obtained today due to virtual visit.    Physical Exam   GENERAL: Healthy, alert and no distress  EYES: Eyes grossly normal to inspection.  No discharge or erythema, or obvious scleral/conjunctival abnormalities.  RESP: No audible wheeze, cough, or visible cyanosis.  No visible retractions or increased work of breathing.    MS: No gross  musculoskeletal defects noted.  Normal range of motion.  No visible edema.  SKIN: Visible skin clear. No significant rash, abnormal pigmentation or lesions.  NEURO: Cranial nerves grossly intact.  Mentation and speech appropriate for age.  PSYCH: Mentation appears normal, affect normal/bright, judgement and insight intact, normal speech and appearance well-groomed.    No results found for any visits on 09/12/23.          Video-Visit Details    Type of service:  Video Visit   Video Start Time: 3:25 PM  Video End Time:3:49 PM    Originating Location (pt. Location): Home    Distant Location (provider location):  On-site  Platform used for Video Visit: [employer]

## 2023-09-18 PROBLEM — Z98.890 STATUS POST CORONARY ANGIOGRAM: Status: RESOLVED | Noted: 2022-06-20 | Resolved: 2023-01-01

## 2023-09-18 PROBLEM — J44.9: Status: RESOLVED | Noted: 2023-01-01 | Resolved: 2023-01-01

## 2023-09-18 PROBLEM — R79.89 ELEVATED BRAIN NATRIURETIC PEPTIDE (BNP) LEVEL: Status: RESOLVED | Noted: 2021-12-17 | Resolved: 2023-01-01

## 2023-09-18 PROBLEM — Z95.2 HISTORY OF AORTIC VALVE REPLACEMENT: Status: ACTIVE | Noted: 2023-01-01

## 2023-09-18 PROBLEM — J44.9: Status: ACTIVE | Noted: 2023-01-01

## 2023-09-18 PROBLEM — F10.20 ALCOHOL DEPENDENCE (H): Status: ACTIVE | Noted: 2023-01-01

## 2023-09-18 PROBLEM — E66.811 OBESITY, CLASS I, BMI 30.0-34.9 (SEE ACTUAL BMI): Status: ACTIVE | Noted: 2023-01-01

## 2023-09-18 PROBLEM — F10.930 ALCOHOL WITHDRAWAL SYNDROME WITHOUT COMPLICATION (H): Status: RESOLVED | Noted: 2022-03-10 | Resolved: 2023-01-01

## 2023-09-18 PROBLEM — I35.0 AORTIC STENOSIS, SEVERE: Status: RESOLVED | Noted: 2022-08-09 | Resolved: 2023-01-01

## 2023-09-18 PROBLEM — F10.20 ALCOHOL DEPENDENCE (H): Status: RESOLVED | Noted: 2022-02-08 | Resolved: 2023-01-01

## 2023-09-21 NOTE — PROGRESS NOTES
[facility]    A/P                                                    ASSESSMENT/PLAN  Diagnoses and all orders for this visit:  Severe alcohol use disorder, in sustained remission (H)  Chronic insomnia  Chronic pain syndrome  Mild major depression (H)    No orders of the defined types were placed in this encounter.      Problem list updated Sep 21, 2023   No problems updated.      Sep 21, 2023  - Remains abstinent from alcohol, denies cravings  - Asking about preventing or at least preparing for possible depression this winter. Wondering if restarting celexa would be a reasonable idea. Will send in Rx if she calls and requests this  - For now states mood is stable and feels well overall  - Overall, continued to recommend Linda transition her care back to PCP and/or sleep medicine for med mgmt.  - She indicates she may look for a provider closer to home and has appt's setup over the next couple of months. Will route to current PCP as FYI regarding this transition  - Will reach out to [facility] to see if they will manage sleep medications ongoing. Unclear if they primarily manage or transition to PCP  - Notes doxepin is working great, in combination with trazodone and gabapentin. Has tried belsomra and ambien recently with some efficacy, but does not think she needs these      Last encounter A/P  Jul 5, 2023  - Naltrexone for alcohol cravings  - OK to increase gabapentin to 1200mg between appt  - No refills without f/up scheduled  - Start doxepin to see if this can help, given her insomnia has been wearing on her MH  - Further discussions with PCP and sleep medicine regarding sleep therapies      PDMP Review         Value Time User    State PDMP site checked  Yes 9/21/2023  1:53 PM Jin Ackerman MD              RTC  No follow-ups on file.      Counseled the patient on the importance of having a recovery program in addition to medication to manage recovery.  Components  "include avoiding isolating, having willingness to change, avoiding triggers and managing cravings. Encouraged having some type of sober network and practicing honesty with trusted support person(s). Encouraged other services such as counseling, 12 step or other self-help organizations.        SUBJECTIVE                                                    Kavitha Headley is a 77 year old female who presents to clinic today for follow up    Visit performed Virtual, via video    Video-Visit Details    Type of service:  Video Visit    Video Start Time: 1:51 PM  Video End Time:  2:02 PM    Originating Location (pt. Location): Home    Distant Location (provider location):  [facility] office     Platform used for Video Visit: [employer]        PHQ-9 Score:       2/8/2022     3:35 PM 7/15/2022     3:34 PM 7/5/2023    10:35 AM   PHQ   PHQ-9 Total Score 4 3 5   Q9: Thoughts of better off dead/self-harm past 2 weeks Not at all Not at all Not at all       NITHIN-7 Score:      1/13/2022     1:17 PM 2/2/2022     1:34 PM 2/8/2022     3:35 PM   NITHIN-7 SCORE   Total Score  8 (mild anxiety)    Total Score 8 8 3           Recent HPI Details:  HPI Jul 5, 2023  - Feels \"practically suicidal\" when she cannot sleep. Tries to exercise and garden regularly. Has many supplements she tries to take without success  - No alcohol use since March 2022. Used this partly to help with sleep. Has cravings for alcohol when she is not sleeping. 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 Feels her MH is generally stable/positive but has low energy and gets down about her aches/pains at times. This rarely/never lasts all day. Stopped seroquel without concern d/t weight gain  - Attempted to take gabapentin 1200mg at night for sleep, which has been somewhat effective. Currently taking 600mg but isn't sure if it is helpful      TODAY'S VISIT  HPI Sep 21, 2023  - Sleep is much, much better  - Taking doxepin " and gabapentin and trazodone with great effect  - Also seeing sleep medicine and was trialed on ambien. Feels she does not need both  - Now taking butrans for pain, which has been very helpful for arthritis  - Bought a camper, which was a pretty good experience, but felt very claustrophobic  - Winter is upcoming so today she wants to talk about starting an anti-depressant  - No alcohol use or cravings      OBJECTIVE                                                    PHYSICAL EXAM:  There were no vitals taken for this visit.    GENERAL: healthy, alert and no distress  EYES: Eyes grossly normal to inspection, PERRL and conjunctivae and sclerae normal  RESP: No respiratory distress  MENTAL STATUS EXAM  Appearance/Behavior: No appearant distress  Speech: Normal  Mood/Affect: normal affect  Insight: Adequate    LAB  No results found for any visits on 09/21/23.      HISTORY                                                    Problem list reviewed & adjusted, as indicated.  Patient Active Problem List   Diagnosis    Spinal stenosis    Benign essential hypertension    Chronic insomnia    Alcohol use disorder, severe, in early remission (H)    CKD (chronic kidney disease) stage 3, GFR 30-59 ml/min (H)    Knee joint replacement status    Chronic pain syndrome    Bariatric surgery status    Personal history of urinary calculi    Anxiety    Vitamin B12 deficiency (non anemic)    Liver disease, chronic, due to alcohol (H)    Coronary artery disease involving native coronary artery of native heart without angina pectoris    Mild ascending aorta dilatation (H)    Psoriasis - on Humira - Dr. Gauri Clark with dermatology    Thiamine deficiency    Alcohol dependence in remission (H)    Cold sore    Gastroesophageal reflux disease with esophagitis - chronic due to alcohol    Enlarged pulmonary artery (H)    Acute respiratory failure with hypoxia (H) - 12/2021 - due to influenza A - persistent issues, ? underlying RAD - seeing [facility] -  Dr. Ventura    Chronic diastolic (congestive) heart failure (H)    Mild recurrent major depression (H)    Non-traumatic rhabdomyolysis    History of aortic valve replacement - Severe aortic stenosis status post TAVR with 23 mm S3 valve on 8/9/22    Obesity, Class I, BMI 30.0-34.9 (see actual BMI)         MEDICATION LIST (prior to visit)  acetaminophen (TYLENOL) 500 MG tablet, Take 1,000 mg by mouth 2 times daily as needed for pain  aspirin (ASA) 81 MG EC tablet, Take 81 mg by mouth daily  bisacodyl (DULCOLAX) 5 MG EC tablet, Take 5 mg by mouth daily as needed for constipation  buprenorphine (BUTRANS) 5 MCG/HR WK patch, Place 1 patch onto the skin once a week  doxepin (SINEQUAN) 10 MG capsule, TAKE 2 CAPSULES BY MOUTH AT BEDTIME  folic acid (FOLVITE) 1 MG tablet, Take 1 mg by mouth daily  gabapentin (NEURONTIN) 600 MG tablet, TAKE 1 TABLET BY MOUTH AT BEDTIME  lisinopril-hydrochlorothiazide (ZESTORETIC) 10-12.5 MG tablet, Take 1 tablet by mouth daily  MISC NATURAL PRODUCTS PO, Take 1 tablet by mouth daily *L-Tryptophan 1000 mg*  Multiple Vitamins-Minerals (CENTRUM SILVER) per tablet, Take 1 tablet by mouth daily  polyethylene glycol (MIRALAX) 17 GM/Dose powder, Take 17 g by mouth 2 times daily as needed for constipation  traZODone (DESYREL) 100 MG tablet, TAKE 1 TABLET BY MOUTH AT BEDTIME  valACYclovir (VALTREX) 1000 mg tablet, TAKE 2 TABLETS (2,000 MG) BY MOUTH AS NEEDED (ONSET OF COLD SORE)  zolpidem ER (AMBIEN CR) 6.25 MG CR tablet, Take 6.25 mg by mouth At Bedtime  [DISCONTINUED] metoprolol succinate ER (TOPROL XL) 25 MG 24 hr tablet, Take 1 tablet (25 mg) by mouth daily    No current facility-administered medications on file prior to visit.      MEDICATION LIST (after visit)  Current Outpatient Medications   Medication    acetaminophen (TYLENOL) 500 MG tablet    aspirin (ASA) 81 MG EC tablet    bisacodyl (DULCOLAX) 5 MG EC tablet    buprenorphine (BUTRANS) 5 MCG/HR WK patch    doxepin (SINEQUAN) 10 MG capsule     folic acid (FOLVITE) 1 MG tablet    gabapentin (NEURONTIN) 600 MG tablet    lisinopril-hydrochlorothiazide (ZESTORETIC) 10-12.5 MG tablet    MISC NATURAL PRODUCTS PO    Multiple Vitamins-Minerals (CENTRUM SILVER) per tablet    polyethylene glycol (MIRALAX) 17 GM/Dose powder    traZODone (DESYREL) 100 MG tablet    valACYclovir (VALTREX) 1000 mg tablet    zolpidem ER (AMBIEN CR) 6.25 MG CR tablet     No current facility-administered medications for this visit.         Allergies   Allergen Reactions    Bactrim [Sulfamethoxazole-Trimethoprim] Hives    Morphine Itching     NAUSEA    Morphine And Related Itching     NAUSEA       Additional MDM Details:  none    Jin Ackerman MD  [facility]  [phone]

## 2023-09-21 NOTE — PROGRESS NOTES
"Virtual Visit Details    Type of service:  Video Visit   Video Start Time: {video visit start/end time for provider to select:964156}  Video End Time:{video visit start/end time for provider to select:396778}    Originating Location (pt. Location): {video visit patient location:002848::\"Home\"}  {PROVIDER LOCATION On-site should be selected for visits conducted from your clinic location or adjoining [facility] hospital, academic office, or other nearby [facility] building. Off-site should be selected for all other provider locations, including home:036126}  Distant Location (provider location):  {virtual location provider:020669}  Platform used for Video Visit: {Virtual Visit Platforms:475195::\"[employer]\"}  "

## 2023-09-21 NOTE — PROGRESS NOTES
"Virtual Visit Details    Type of service:  Video Visit   Video Start Time: {video visit start/end time for provider to select:685986}  Video End Time:{video visit start/end time for provider to select:315844}    Originating Location (pt. Location): {video visit patient location:384498::\"Home\"}  {PROVIDER LOCATION On-site should be selected for visits conducted from your clinic location or adjoining [facility] hospital, academic office, or other nearby [facility] building. Off-site should be selected for all other provider locations, including home:898689}  Distant Location (provider location):  {virtual location provider:787119}  Platform used for Video Visit: {Virtual Visit Platforms:242937::\"[place]\"}  "

## 2023-09-21 NOTE — NURSING NOTE
Is the patient currently in the state of [place]? YES    Visit mode:VIDEO    If the visit is dropped, the patient can be reconnected by: VIDEO VISIT: Send to e-mail at: [email or web address]    Will anyone else be joining the visit? NO  (If patient encounters technical issues they should call [phone])    How would you like to obtain your AVS? MyChart    Are changes needed to the allergy or medication list? No    Reason for visit: RECHECK    P[place] LAY

## 2023-09-26 NOTE — RESULT ENCOUNTER NOTE
Linda  I have reviewed your recent test results:    -Hemoglobin is decreased indicating anemia -this is similar to your historical levels.  Anemia can cause fatigue and, occasionally, light-headedness.  Continue to monitor at least every 1 to 2 months.  -White blood cell and platelet counts are normal.  -LDL(bad) cholesterol level is elevated which can increase your heart disease risk.  A diet high in fat and simple carbohydrates, genetics and being overweight can contribute to this. ADVISE: Are you taking your pravastatin?  It appears this was discontinued in your chart in July by Dr. Ackerman.  Your LDL has increased when compared to previous level in October 2021.  If you have discontinued please let me know right away as I will send over a refill as this should be restarted.  Follow-up with Dr. Ugarte   -Liver and gallbladder tests (ALT,AST, Alk phos,bilirubin) are normal.  -Kidney function (GFR) is decreased slightly from previous levels even as recent as 3 weeks ago.  ADVISE: Ensure you are staying well-hydrated and recheck this level in 1 month at your annual physical  -Sodium is decreased.  This was previously normal 3 weeks ago.  ADVISE: rechecking this in 1 month.  -Potassium is normal.  -Calcium is normal.  -Glucose (diabetic screening test) is normal.  -TSH (thyroid stimulating hormone) level is normal which indicates normal thyroid function.  - B12 level is normal.  Please continue your current supplementation if you are taking one.  -Folate level is low/normal when compared to previous levels.  Please ensure you are taking your folic acid supplement of 1 mg once daily    For additional lab test information, www.testing.com is an excellent reference.     If you have any questions please do not hesitate to contact our office via phone ([phone]) or [employer].    Healthy regards,     Nayeli Castorena MBA, MS, PA-C  [facility]

## 2023-09-27 NOTE — TELEPHONE ENCOUNTER
----- Message from Ember Bush CNP sent at 9/26/2023  7:15 PM CDT -----  Her sodium is quite low with the initiation of hydrochlorothiazide-lisinopril. Let's first call her to make sure she is feeling okay. She will need to stop this medication. Recommend replacing it with amlodipine 10 mg daily with repeat BMP in 1 week. Thanks!

## 2023-09-27 NOTE — TELEPHONE ENCOUNTER
Called patient and reviewed Ember's recommendation. Patient stated she understood. Will send a message to our , [place], to call patient tomorrow to help get her scheduled for a repeat BMP.

## 2023-10-03 NOTE — TELEPHONE ENCOUNTER
Pt has not yet decided on PCP - multiple appts this fall - can you bridge RX for another 60 days?      Nayeli Castorena MBA, MS, PA-C  [facility]

## 2023-10-03 NOTE — CONFIDENTIAL NOTE
Refilling gabapentin.    I talked to [facility] and they agreed to assume mgmt of all sleep medications, including gabapentin. Everypostt message sent, asking Linda to talk with [facility] for further refills.    Jin Ackerman MD

## 2023-10-09 NOTE — TELEPHONE ENCOUNTER
----- Message from Ember Bush CNP sent at 10/7/2023  5:56 AM CDT -----  Her sodium is still on the low. Can we confirm she stopped taking her hydrochlorothiazide combination pill? Thank you!

## 2023-10-09 NOTE — TELEPHONE ENCOUNTER
Called patient who apologized and stated she has not stopped taking the hydrochlorothiazide yet and thus has not started the amlodipine. Patient stated she would stop taking today and  her amlodipine as instructed. Informed patient that our , Petrona, may call her to scheduled another BMP in one week.    Will route update to Ember.

## 2023-10-10 NOTE — LETTER
12/10/2018    Nayeli Castorena PA-C  [address] [facility] [address]    RE: Kavitha Headley       Dear Colleague,    I had the pleasure of seeing Kavitha Headley in the [facility].    Clinic visit note dictated. Dictation reference number - [id number]      REVIEW OF SYSTEMS:  A comprehensive 10-point review of systems was completed and the pertinent positives are documented in the history of present illness.    Skin:  Negative     Eyes:  Positive for glasses  ENT:  Negative    Respiratory:  Negative    Cardiovascular:    palpitations;Positive for  Gastroenterology: Negative    Genitourinary:  Negative    Musculoskeletal:  Positive for arthritis  Neurologic:  Negative    Psychiatric:  Positive for sleep disturbances(4-5 hrs per night)  Heme/Lymph/Imm:  Positive for    Endocrine:  Negative      CURRENT MEDICATIONS:  Current Outpatient Medications   Medication Sig Dispense Refill     atenolol (TENORMIN) 25 MG tablet Take 1 tablet (25 mg) by mouth every morning 100 tablet 4     baclofen (LIORESAL) 10 MG tablet TAKE 0.5 TABLET (5 MG) BY MOUTH 2 TIMES DAILY AS NEEDED FOR MUSCLE SPASMS 30 tablet 0     Cyanocobalamin (B-12) 1000 MCG TBCR Take 1,000 mcg by mouth daily 100 tablet 1     diphenhydrAMINE (BENADRYL) 25 MG capsule Take 25 mg by mouth nightly as needed       folic acid (FOLVITE) 1 MG tablet Take 1 tablet (1 mg) by mouth daily 30 tablet      furosemide (LASIX) 20 MG tablet Take 1 tablet (20 mg) by mouth as needed (leg edema) 30 tablet 4     gabapentin (NEURONTIN) 300 MG capsule TAKE 1 CAPSULE BY MOUTH THREE TIMES A DAY 90 capsule 3     ketoconazole (NIZORAL) 2 % shampoo Apply to the affected area and wash off after 5 minutes. 120 mL 1     magnesium oxide (MAG-OX) 400 MG tablet Take 1 tablet (400 mg) by mouth daily 7 tablet      melatonin 3 MG tablet Take 9 mg by mouth nightly as needed for sleep        Multiple Vitamins-Minerals (CENTRUM SILVER) per tablet Take 1 tablet by  mouth daily       polyethylene glycol (MIRALAX/GLYCOLAX) Packet Take 17 g by mouth daily as needed (constipation) 7 packet      senna-docusate (SENOKOT-S;PERICOLACE) 8.6-50 MG per tablet Take 1-2 tablets by mouth 2 times daily as needed for constipation 100 tablet      spironolactone (ALDACTONE) 100 MG tablet Take 1 tablet (100 mg) by mouth daily 90 tablet 1     STATIN NOT PRESCRIBED, INTENTIONAL, Please choose reason not prescribed, below       sucralfate (CARAFATE) 1 GM/10ML suspension Take 10 mLs (1 g) by mouth 4 times daily (before meals and nightly) Pt takes once daily 1200 mL 1     thiamine 100 MG tablet Take 1 tablet (100 mg) by mouth daily       traZODone (DESYREL) 100 MG tablet TAKE 1 TABLET (100 MG) BY MOUTH NIGHTLY AS NEEDED FOR SLEEP 90 tablet 1     valACYclovir (VALTREX) 1000 mg tablet TAKE 2 TABS BY MOUTH EVERY 12 HOURS FOR 1 DAY ONLY FOR OUTBREAKS OF COLD SORES AS NEEDED 4 tablet 2     vortioxetine (BRINTELLIX) 10 MG tablet Take 1 tablet (10 mg) by mouth daily 90 tablet 0         ALLERGIES:  Allergies   Allergen Reactions     Bactrim [Sulfamethoxazole W/Trimethoprim] Hives     Codeine Itching     NAUSEA     Morphine Itching     NAUSEA       PAST MEDICAL HISTORY:    Past Medical History:   Diagnosis Date     Alcohol abuse     long term alcohol abuse     Anxiety disorder      Ascending aorta dilatation (H)      Bariatric surgery status 1996?    gastric bypass, [employer] [facility] and     Benign hypertension      Chronic insomnia      Chronic pain syndrome     Chronic back and neck pain, chronic pain due to osteoarthritis multiple joints     Coronary artery disease 9/2015    mild distal branch disease on cath     Coronary artery disease involving native coronary artery of native heart without angina pectoris 10/16/2018    Minimal coronary artery disease on coronary angiogram in 2015.      Hip joint replacement status 4/2004    right     Knee joint replacement status 12/2005    left     Macrocytic anemia     Mild  macrocytic anemia, 2012 to present, likely based on alcohol abuse.     Major depressive disorder, single episode, severe, without mention of psychotic behavior      Mixed hyperlipidemia      Moderate aortic stenosis 5/2014    mild/mod AS with peak gradient 33/mean gradient 20 mmHg, AV area 1.2     Pelvic relaxation disorder     Surgical intervention for cystocele/rectocele 3,11/2012     Personal history of urinary calculi 6/2006    left ureteral stone,lithotripsy     PVC (premature ventricular contraction)      Sinus tachycardia      Stage III chronic kidney disease (H) 2005     SVT (supraventricular tachycardia) (H)     likely atrial tachycardia       PAST SURGICAL HISTORY:    Past Surgical History:   Procedure Laterality Date     APPENDECTOMY  3/2004    incidental     C GASTRIC BYPASS,OBESE<100CM ARIANNA-EN-Y  1996     C MEDIASTINOSCOPY W OR WO BIOPSY  2/2008    Videomediastinoscopy and, for mediastinal adenopathy -reactive lymphoid hyperplasia     C REPAIR OF RECTOCELE  3/2012     C TOTAL KNEE ARTHROPLASTY  12/2005    left      CARPAL TUNNEL RELEASE RT/LT  10/2010    Carpometacarpal excisional arthroplasty with a fascial autograft and APL suspension sling (12084). 2. Left thumb metacarpophalangeal joint fusion with autologous bone graft (73760). 3. Left endoscopic carpal tunnel release      CHOLECYSTECTOMY, LAPOROSCOPIC  11/2010    Cholecystectomy, Laparoscopic     COLONOSCOPY N/A 9/8/2016    Procedure: COMBINED COLONOSCOPY, SINGLE OR MULTIPLE BIOPSY/POLYPECTOMY BY BIOPSY;  Surgeon: Moe Barlow MD;  Location:  GI     CYSTOCELE REPAIR  11/2012    [facility] laparoscopic sacrocolpopexy, enterocele repair, lysis of adhesions, placement of retropubic mid urethral sling, cystoscopy     CYSTOSCOPY, LITHOTRIPSY, COMBINED  6/2006    Left extracorporeal shock wave lithotripsy, cystoscopy, left ureteral stent placement.     CYSTOSCOPY, REMOVE STENT(S), COMBINED  7/2006    Cystoscopy, removal of left ureteral stent,  retrograde pyelography, flexible and rigid ureteroscopy and holmium laser lithotripsy, basket removal of stone fragments, ureteral stent placement.      ENDOSCOPIC ULTRASOUND UPPER GASTROINTESTINAL TRACT (GI) N/A 6/12/2017    Procedure: ENDOSCOPIC ULTRASOUND, ESOPHAGOSCOPY / UPPER GASTROINTESTINAL TRACT (GI);  ENDOSCOPIC ULTRASOUND, ESOPHAGOSCOPY / UPPER GASTROINTESTINAL TRACT (GI);  Surgeon: Parth Graham MD;  Location: [facility]     HERNIA REPAIR  4/2012    bilateral augmentation mastopexy, ventral hernia repair, and medial thigh liposuction on 04/06/2012.      HYSTERECTOMY VAGINAL, BILATERAL SALPINGO-OOPHERECTOMY, COMBINED  1998    due to myoma and bleeding     JOINT REPLACEMENT, HIP RT/LT  4/2004    right total hip arthroplasty     LAPAROTOMY, LYSIS ADHESIONS, COMBINED  3/2004    lysis adhesions, ventral hernia repair, appendectomy incidentally     LYMPH NODE BIOPSY  4/2008    right axillary, reactive follicular and paracortical hyperplasia.     MAMMOPLASTY AUGMENTATION BILATERAL  4/2012     REVISE RECONSTRUCTED BREAST  6/7/2012    Left breast capsulotomy.        FAMILY HISTORY:    Family History   Problem Relation Age of Onset     Substance Abuse Father      Cancer Father         throat and lung mets     Diabetes No family hx of      Coronary Artery Disease No family hx of      Cerebrovascular Disease No family hx of        SOCIAL HISTORY:    Social History     Socioeconomic History     Marital status:      Spouse name: Mt     Number of children: 4     Years of education: 18     Highest education level: None   Social Needs     Financial resource strain: None     Food insecurity - worry: None     Food insecurity - inability: None     Transportation needs - medical: None     Transportation needs - non-medical: None   Occupational History     Occupation: nurse     Employer: [employer]     Employer: RETIRED   Tobacco Use     Smoking status: Never Smoker     Smokeless tobacco: Never Used   Substance and  "Sexual Activity     Alcohol use: Yes     Alcohol/week: 0.0 oz     Comment: 5 drinks per week     Drug use: No     Sexual activity: Yes     Partners: Male   Other Topics Concern      Service Not Asked     Blood Transfusions No     Caffeine Concern Yes     Comment: 1-2 cups per day      Occupational Exposure Yes     Comment: blood     Hobby Hazards No     Sleep Concern Yes     Stress Concern Yes     Weight Concern Yes     Comment: gastric  byepass     Special Diet No     Back Care No     Exercise Yes     Comment: walk, swin     Bike Helmet No     Seat Belt Yes     Self-Exams Yes     Parent/sibling w/ CABG, MI or angioplasty before 65F 55M? Not Asked   Social History Narrative     None       PHYSICAL EXAM:    Vitals: /60   Pulse 65   Ht 1.626 m (5' 4\")   Wt 75.6 kg (166 lb 11.2 oz)   SpO2 95%   BMI 28.61 kg/m     Wt Readings from Last 5 Encounters:   12/10/18 75.6 kg (166 lb 11.2 oz)   09/27/18 75.9 kg (167 lb 6.4 oz)   09/06/18 77.6 kg (171 lb)   08/28/18 78 kg (172 lb)   08/07/18 80 kg (176 lb 4.8 oz)       Thank you for allowing me to participate in the care of your patient.      Sincerely,     Herman Ugarte MD     [facility]    cc:   Herman Ugarte MD  [address] [place] [id number]        " Medication Management  CPA in place:  Yes  Recommendation Provided To: Patient/Caregiver: 2 via In person  Intervention Detail: Lab(s) Ordered  Intervention Accepted By: Patient/Caregiver: 2  Gap Closed?: Yes   Time Spent (min): [place], Pharm D, [address] [facility]  10/10/2023 1:30 PM

## 2023-10-10 NOTE — TELEPHONE ENCOUNTER
Ember responded stated she would like a repeat BMP in one week. Message was sent to , Petrona, to reach out to patient to arrange.

## 2023-10-25 PROBLEM — F33.2 SEVERE EPISODE OF RECURRENT MAJOR DEPRESSIVE DISORDER, WITHOUT PSYCHOTIC FEATURES (H): Status: ACTIVE | Noted: 2023-01-01

## 2023-10-25 PROBLEM — R06.02 SHORTNESS OF BREATH: Status: ACTIVE | Noted: 2023-01-01

## 2023-10-25 PROBLEM — Z00.00 ANNUAL PHYSICAL EXAM: Status: ACTIVE | Noted: 2023-01-01

## 2023-10-25 PROBLEM — D64.9 ANEMIA, UNSPECIFIED TYPE: Status: ACTIVE | Noted: 2023-01-01

## 2023-10-25 PROBLEM — F10.20 ALCOHOL DEPENDENCE (H): Status: ACTIVE | Noted: 2023-01-01

## 2023-10-25 PROBLEM — K70.30 ALCOHOLIC CIRRHOSIS OF LIVER WITHOUT ASCITES (H): Status: ACTIVE | Noted: 2023-01-01

## 2023-10-25 NOTE — PROGRESS NOTES
Assessment & Plan     Alcoholic cirrhosis of liver without ascites (H)  Chronic diastolic (congestive) heart failure (H)  Liver disease, chronic, due to alcohol (H24)  - US Abdomen Limited; Future  Most recent liver enzymes were stable.    Severe episode of recurrent major depressive disorder, without psychotic features (H)  Stable. Continue medication.  Celexa 10, doxepin 10.     Shortness of breath  This is cardiac vs pulmonary in origin.   EF normal, s/p TAVR, no symptoms at this time.  No wheezing.   CT scan to rule out pulmonary cause.   - CT Chest w/o Contrast; Future    Benign essential hypertension  Stable. Continue medication.  - Basic metabolic panel    Anemia, unspecified type  Macrocytic anemia due to alcohol abuse in the past. Now sober for 18 months. Hgb low still, will check iron.  - IRON AND IRON BINDING CAPACITY  - FERRITIN    Stage 3 chronic kidney disease, unspecified whether stage 3a or 3b CKD (H)  - Basic metabolic panel  - Albumin Random Urine Quantitative with Creat Ratio; Future    Need for prophylactic vaccination and inoculation against influenza       See Patient Instructions    Ghazal Rubalcava MD  [facility]    Subjective   Linda is a 78 year old, presenting for the following health issues:  Physical and Imm/Inj (Flu Shot)    HPI     Linda is here to establish care.   She has chronic back pain hx which is as follows:  Mid and lower back pain: following [facility] pain clinic. Imaging shows disc protrusion causing indentation in spinal cord in 2021 - thoracic spine  ISAEL at [facility]. Due for another one. She has also been to [facility]. She had an ablation at [facility]  She is on gabapentin 600 mg at bedside and zanaflex during the day - it makes her drowsy. No red flag symptoms. Not seen by neurosurgery. No weakness in legs.   Mildly dilated ascending aorta measuring 4.1 cm, paroxysmal SVT and PACs, hypertension, hyperlipidemia, obesity s/p gastric bypass surgery, chronic diastolic heart failure  "and severe aortic stenosis s/p TAVR (8/9/2022).  She complains of SOB. EF normal. No wheezing. Chest exam normal   Nonsmoker   ETOH: sober for 18 months.   Liver fatty infiltration in 2017, LFT normal.   Declined covid booster         Review of Systems         Objective    /81 (BP Location: Left arm, Patient Position: Sitting, Cuff Size: Adult Large)   Pulse 76   Temp 98.7  F (37.1  C) (Tympanic)   Resp 16   Ht 1.575 m (5' 2\")   Wt 85.7 kg (188 lb 14.4 oz)   SpO2 99%   BMI 34.55 kg/m    Body mass index is 34.55 kg/m .  Physical Exam                 "

## 2023-10-25 NOTE — PATIENT INSTRUCTIONS
Please call [facility] at [phone] to schedule your imaging     Vitamin D3 supplement daily

## 2023-10-26 NOTE — CONFIDENTIAL NOTE
Rx sent.    Diagnoses and all orders for this visit:  Mild major depression (H24)  -     citalopram (CELEXA) 10 MG tablet; Take 1 tablet (10 mg) by mouth daily      Orders Placed This Encounter   Medications    citalopram (CELEXA) 10 MG tablet     Sig: Take 1 tablet (10 mg) by mouth daily     Dispense:  30 tablet     Refill:  1     Jin Ackerman MD

## 2023-10-26 NOTE — TELEPHONE ENCOUNTER
Called pt.    Let her know that Dr. Ackerman sent Celexa Rx to her [facility].    Pt verbalized appreciation for prescription and phone call to let her know.

## 2023-10-26 NOTE — TELEPHONE ENCOUNTER
Reason for Call:  Medication or medication refill:    Do you use a [facility]?  Name of the pharmacy and phone number for the current request:  [facility], [place]-[phone]    Name of the medication requested: Anti-depressant     Other request: Pt requesting anti-depressant medication. States she and  discussed it at last appt and pt declined as things were going well then. However a lot has been going on. Scheduled for a video follow up 12/21 at 2pm with .     Can we leave a detailed message on this number? YES    Phone number patient can be reached at: Cell number on file:    Telephone Information:   Mobile [phone]       Best Time: After 12 pm.    Call taken on 10/26/2023 at 3:39 PM by Chante Worley

## 2023-10-26 NOTE — TELEPHONE ENCOUNTER
"Returned phone call to patient.    Pt reports she has been experiencing more depressive symptoms and is wondering if Dr. Ackerman will prescribe her the antidepressant that they discussed at her visit on 9/21/2023. See following note in bold from visit:  Sep 21, 2023  - Remains abstinent from alcohol, denies cravings  - Asking about preventing or at least preparing for possible depression this winter. Wondering if restarting celexa would be a reasonable idea. Will send in Rx if she calls and requests this  - For now states mood is stable and feels well overall  - Overall, continued to recommend Linda transition her care back to PCP and/or sleep medicine for med mgmt.  - She indicates she may look for a provider closer to home and has appt's setup over the next couple of months. Will route to current PCP as FYI regarding this transition  - Will reach out to [facility] to see if they will manage sleep medications ongoing. Unclear if they primarily manage or transition to PCP  - Notes doxepin is working great, in combination with trazodone and gabapentin. Has tried belsomra and ambien recently with some efficacy, but does not think she needs these    Pt denies SI, wishing to be dead or any other safety concerns at this time.     Pt reports she has been on Celexa in the past and tolerated it well. Pt also states \"just don't put me on the lowest dose\". Informed pt that starting at too high of a dose can have uncomfortable adverse effects, but that this request would be passed on to Dr. Walker. Also let pt know that it can take weeks for her to really start noticing the benefits of the medication. Pt verbalized understanding.     Asked pt if she would like for me to call her in a week or so to see how she's doing, and pt happily accepted this offer.    Pt also verbalized concern about her appointment being out so far (Dec. 21st). Offered pt urgent appointment slot on Nov. 16th at 1pm for a telephone visit with " Dr. Ackerman which she was appreciative of.    Will call to follow-up with patient in about one week.    Routing to Dr. Ackerman.

## 2023-10-26 NOTE — RESULT ENCOUNTER NOTE
The following letter pertains to your most recent diagnostic tests:    My name is Dr. Armstrong and I am covering for Dr. Rubalcava who is out of the clinic today.     The iron studies are normal            Sincerely,    Dr. Armstrong

## 2023-10-30 NOTE — TELEPHONE ENCOUNTER
No further action needed from provider at this time.    RN to call patient after 1-week of new medication escitalopram to see how its going and address any concerns.     Postponing encounter to 11/2/23.    Judit Burnett RN on 10/30/2023 at 10:26 AM

## 2023-11-01 PROBLEM — Z23 NEED FOR PROPHYLACTIC VACCINATION AND INOCULATION AGAINST INFLUENZA: Status: ACTIVE | Noted: 2023-01-01

## 2023-11-02 NOTE — TELEPHONE ENCOUNTER
Called and spoke to patient regarding the celexa. She doesn't feel any difference yet. She has no side effects. I told her to give us a call if anything changes.     Date of Last Office Visit: 9/21/23  Date of Next Office Visit: 11/16/23  No shows since last visit: 0  Cancellations since last visit: 0    Medication requested: doxepin (SINEQUAN) 10 MG capsule  Date last ordered: 9/8/23 Qty: 60 Refills: 1     Review of [place] ?: NA    Lapse in medication adherence greater than 5 days?: No  If yes, call patient and gather details: na  Medication refill request verified as identical to current order?: yes  Result of Last DAM, VPA, Li+ Level, CBC, or Carbamazepine Level (at or since last visit): N/A    Last visit treatment plan: Severe alcohol use disorder, in sustained remission (H)  Chronic insomnia  Chronic pain syndrome  Mild major depression (H)  - Remains abstinent from alcohol, denies cravings  - Asking about preventing or at least preparing for possible depression this winter. Wondering if restarting celexa would be a reasonable idea. Will send in Rx if she calls and requests this  - For now states mood is stable and feels well overall  - Overall, continued to recommend Linda transition her care back to PCP and/or sleep medicine for med mgmt.  - She indicates she may look for a provider closer to home and has appt's setup over the next couple of months. Will route to current PCP as FYI regarding this transition  - Will reach out to [facility] to see if they will manage sleep medications ongoing. Unclear if they primarily manage or transition to PCP  - Notes doxepin is working great, in combination with trazodone and gabapentin. Has tried belsomra and ambien recently with some efficacy, but does not think she needs these       []Medication refilled per  Medication Refill in Ambulatory Care  policy.  [x]Medication unable to be refilled by RN due to criteria not met as indicated below:    []Eligibility -  not seen in the last year   []Supervision - no future appointment   []Compliance - no shows, cancellations or lapse in therapy   []Verification - order discrepancy   []Controlled medication   []Medication not included in policy   []90-day supply request   [x]Other

## 2023-11-02 NOTE — CONFIDENTIAL NOTE
Refill sent for doxepin. Will talk about transfer back to PCP or sleep medicine at next visit.    Jin Ackerman MD

## 2023-11-06 NOTE — ED NOTES
Rapid Assessment Note    History:   Kavitha Headley is a 78 year old female who presents accompanied for evaluation of ongoing worsening shortness of breath with exertion over the past 3 months or so.  She can get to the bathroom and back but notices that the worsening symptoms while doing errands such as going to the store.  She also has some new ankle swelling.  Used to be on hydrochlorothiazide but had potassium issues and it was stopped.  Had a noncontrast CT scan done on November 2.  On chart review this has some changes concerning for possible pulmonary hypertension.  Her last echocardiogram was done in August.  She has a history of aortic valve replacement and reports that the echo looked okay.  She mentioned the shortness of breath to the PA at the time and was told her lungs sounded clear.  Does not notice worsening of symptoms with lying down.  Called the primary care clinic today and was referred to the emergency department.    Exam:   General:  Alert, interactive  Cardiovascular:  Well perfused, bilateral peripheral edeam  Lungs:  No respiratory distress, no accessory muscle use  Neuro:  Moving all 4 extremities  Skin:  Warm, dry  Psych:  Normal affect    Plan of Care:   I evaluated the patient and developed an initial plan of care. I discussed this plan and explained that I, or one of my partners, would be returning to complete the evaluation.     EKG, labs, d-dimer to check if CT w/contrast would be beneficial in work up.  Had recent non-contrast CT and no acute worsening today so x-ray today of questionable benefit.     Rossy Christensen MD  11/06/23 0302      Labs reviewed.  D-dimer elevated beyond age appropriate.  Sodium improved.  Renal function appropriate.  CT PE ordered.     Rossy Christensen MD  11/06/23 [id number]

## 2023-11-06 NOTE — TELEPHONE ENCOUNTER
Referral received via [facility] on 11/6/23.    Pt referred to [facility] by Ghazal Rubalcava MD for mild ascending aorta dilatation.     Pt needs to be scheduled for NEW VASCULAR PATIENT consult with vascular medicine.  Will route to scheduling to coordinate an appointment at next available.    Appt note:  Pt referred to [facility] by Ghazal Rubalcava MD for mild ascending aorta dilatation. Echo in [facility] 8/31/23.     Martha ERICKSON, RN    [facility]  Office: [phone]  Fax: [phone]

## 2023-11-06 NOTE — TELEPHONE ENCOUNTER
"Nurse Triage SBAR    Is this a 2nd Level Triage? YES, LICENSED PRACTITIONER REVIEW IS REQUIRED    Situation: Called and spoke with pt. Relayed Dr Carey message from \"results' encounter from 11/6/23.     Background: Pt stated her symptoms are worse. Symptoms have worsened in the last 10 days.     Assessment: While on the phone with pt, her sP02 was 74% and then went up to 97% by the end of the phone call. Pt stated she is SOB with any activity. Pt reports moderate-severe breathing difficulties and somewhat struggling to breathe but is not speaking in single words. Pt also reported occasional wheezing. Her ankles swell and she can hear her heart pound in her head.         Current P is 80-81.     Pt denies dizziness, runny nose, cough, chest pain, fever  Protocol Recommended Disposition:   Go to ED Now. Pt stated \"what are they gonna do for me?\" Writer advised pt to go to ED to be evaluated especially with the low sP02 readings. Pt asked if she has to see pulmonology? Pt is also wondering if she needs to be on oxygen?     Writer informed pt the info will be sent to PCP to advise and we can call her back with PCPs recommendations.     Routing to PCP for recommendations.     Please call pt back with PCPs recommendations.       Routed to provider    Does the patient meet one of the following criteria for ADS visit consideration? 16+ years old, with an [facility] PCP     TIP  Providers, please consider if this condition is appropriate for management at one of our Acute and Diagnostic Services sites.     If patient is a good candidate, please use dotphrase <dot>triageresponse and select Refer to ADS to document.         1. RESPIRATORY STATUS: \"Describe your breathing?\" (e.g., wheezing, shortness of breath, unable to speak, severe coughing)       SOB, occasional wheezing.   2. ONSET: \"When did this breathing problem begin?\"       10/25 appt- the reason why she came in   3. PATTERN \"Does the difficult breathing come and go, or " "has it been constant since it started?\"       SOB with minimal activity.   4. SEVERITY: \"How bad is your breathing?\" (e.g., mild, moderate, severe)     - MILD: No SOB at rest, mild SOB with walking, speaks normally in sentences, can lie down, no retractions, pulse < 100.     - MODERATE: SOB at rest, SOB with minimal exertion and prefers to sit, cannot lie down flat, speaks in phrases, mild retractions, audible wheezing, pulse 100-120.     - SEVERE: Very SOB at rest, speaks in single words, struggling to breathe, sitting hunched forward, retractions, pulse > 120       Moderate-severe but feels like she is struggling somewhat to breathe   5. RECURRENT SYMPTOM: \"Have you had difficulty breathing before?\" If Yes, ask: \"When was the last time?\" and \"What happened that time?\"       No  6. CARDIAC HISTORY: \"Do you have any history of heart disease?\" (e.g., heart attack, angina, bypass surgery, angioplasty)       Aortic valve replacement   7. LUNG HISTORY: \"Do you have any history of lung disease?\"  (e.g., pulmonary embolus, asthma, emphysema)      no  8. CAUSE: \"What do you think is causing the breathing problem?\"       Unknown  9. OTHER SYMPTOMS: \"Do you have any other symptoms? (e.g., dizziness, runny nose, cough, chest pain, fever)      No  10. O2 SATURATION MONITOR:  \"Do you use an oxygen saturation monitor (pulse oximeter) at home?\" If Yes, ask: \"What is your reading (oxygen level) today?\" \"What is your usual oxygen saturation reading?\" (e.g., 95%)        87-90% current. Was 74% and has gone up to 97%.   11. PREGNANCY: \"Is there any chance you are pregnant?\" \"When was your last menstrual period?\"        NA  12. TRAVEL: \"Have you traveled out of the country in the last month?\" (e.g., travel history, exposures)        No  Reason for Disposition   MODERATE difficulty breathing (e.g., speaks in phrases, SOB even at rest, pulse 100-120) of new-onset or worse than normal    Additional Information   Negative: SEVERE " difficulty breathing (e.g., struggling for each breath, speaks in single words, pulse > 120)   Negative: Breathing stopped and hasn't returned   Negative: Choking on something   Negative: Bluish (or gray) lips or face   Negative: Difficult to awaken or acting confused (e.g., disoriented, slurred speech)   Negative: Passed out (i.e., fainted, collapsed and was not responding)   Negative: Wheezing started suddenly after medicine, an allergic food, or bee sting   Negative: Stridor (harsh sound while breathing in)   Negative: Slow, shallow and weak breathing   Negative: Sounds like a life-threatening emergency to the triager   Negative: Chest pain   Negative: Wheezing (high pitched whistling sound) and previous asthma attacks or use of asthma medicines   Negative: Difficulty breathing and within 14 days of COVID-19 Exposure   Negative: Difficulty breathing and only present when coughing   Negative: Difficulty breathing and only from stuffy nose   Negative: Difficulty breathing and only from stuffy nose or runny nose from common cold    Protocols used: Breathing Difficulty-A-[place]

## 2023-11-06 NOTE — TELEPHONE ENCOUNTER
"Called and spoke with pt. Relayed providers message from below. Pt stated \"what are they going to do\"? Informed pt that it is hard to say what they are going to do as they are the provider assessing and can determine what is needed at that point. Pt stated, \"ok\".   "

## 2023-11-06 NOTE — TELEPHONE ENCOUNTER
Future Appointments   Date Time Provider Department Center   11/21/2023  1:10 PM Juan Antonio Montelongo MD [facility]

## 2023-11-06 NOTE — TELEPHONE ENCOUNTER
Definitely go to the ER so shortness of breath and Low O2 saturation  Pulmonology appointments are very difficult to get within a month.

## 2023-11-06 NOTE — TELEPHONE ENCOUNTER
Hello Linda     CT scan is negative for any mass.  Your pulmonary arteries are distended and the radiologist commented this is pulmonary artery hypertension, valvular disease (your aortic valve problem) is the cause of that.  For your aortic aneurysm, although radiology report says stable. The size is now 4.8 cm which is higher than what was measured on echo (4.2 cm). I am referring you to vascular surgery for next steps.  Please call them to make an appointment.   [facility]  [address] [place] [id number]  Phone: [phone]  Fax: [phone]     Dr Ghazal Rubalcava  [facility]   Written by Ghazal Rubalcava MD on 11/6/2023  1:15 PM CST     Relayed providers message from above.     Pt stated her symptoms are worse. Stints of SOB with minimal activity. Increase in swollen ankles. Can hear heart pounding in head.      See triage encounter from 11/6/23 for more details

## 2023-11-06 NOTE — TELEPHONE ENCOUNTER
Pt called the clinic asking for an interpretation of her recent CT scan on 11//2/23. Pt state the results seem alarming to her. Pt stated she is still having the symptoms of ankle swelling, can feel her heart pounding in her head, and SOB with basic activity. Pt stated she has spoken to Dr Rubalcava about these symptoms.     Routing to PCP to review and advise.     Please call pt back with PCPs interpretation and recommendations.

## 2023-11-07 NOTE — ED PROVIDER NOTES
"    History     Chief Complaint:  No chief complaint on file.       HPI   Kavitha Headley is a 78 year old female who presents to the ED today due to shortness of breath that is provoked by walking and other daily activities such as bathing and other forms of exertion. Sx have been going on for 3 months. The patient also disclosed that she has noticed some ankle swelling bilaterally. The patient denies diarrhea, emesis, fever, and cough.      Independent Historian:    The patient and her     Review of External Notes:  I reviewed her echo from 8/31/23 and the clinic note from 10/25/23.    Medications:    Norvasc  Dulcolax  Celexa  Valtrex   Neurontin  Folvite  Sinequan  Desyrel  Miralax  Neurontin  Celexa  Norvasc      Past Medical History:    Alcohol use disorder  Anxiety disorder  Ascending aorta dilatation   Benign hypertension   Chronic insomnia   Chronic pain syndrome  CAD  GERD  Hip joint replacement status  Liver disease due to alcohol  Major depressive disorder  Macrocytic anemia   Mixed hyperlipidemia   Stage 3 CKD  Pelvic relaxation disorder   Personal history of urinary calculi  Psoriasis      Past Surgical History:    Appendectomy   Arthrodesis toes  Carpal tunnel release  Mediastinoscopy with or without biopsy  Cholecystectomy Laparoscopic   Colonoscopy   Right heart catheterization measurements recorded   Transcatheter aortic valve  Cytoscopy   Lithotripsy   Replacement femoral approach  Cystocele repair   Endoscopic ultrasound upper gastrointestinal   Hysterectomy   Hernia repair   Salpingo oophorectomy    Physical Exam   Patient Vitals for the past 24 hrs:   BP Temp Temp src Pulse Resp SpO2 Height Weight   11/06/23 1609 (!) 174/78 97.3  F (36.3  C) Temporal 81 22 95 % 1.575 m (5' 2\") 81.2 kg (179 lb)        Physical Exam  VS: Reviewed per above  HENT: Mucous membranes moist  EYES: sclera anicteric  CV: Rate as noted,  regular rhythm.   RESP: Effort normal. Breath sounds are normal " bilaterally.  GI: no tenderness/rebound/guarding, not distended.  NEURO: Alert, moving all extremities  MSK: No deformity of the extremities, mild symmetric edema at the ankles bilaterally.  SKIN: Warm and dry    Emergency Department Course   ECG  ECG taken at 1632, ECG read at 1640  Normal sinus rhythm   Left axis deviation  Low voltage qrs   Cannor rule out Anterior infarct, age undetermined  Abnormal ECG   When compared with prior ECG, dated 8/3/23,  Rate 79 bpm. MT interval 204 ms. QRS duration 114 ms. QT/QTc 412/472 ms. P-R-T axes 62 -47 48.    ECG  ECG taken at 2215, ECG read at 2217  Sinus rhythm with occasional premature ventricular complexes  Nonspecific intraventricular conduction delay  Borderline ECG   When compared with prior ECG, dated 8/03/23  Rate 75 bpm. MT interval 206 ms. QRS duration 122 ms. QT/QTc 424/473 ms. P-R-T axes 45 -24 68.  Imaging:  CT Chest Pulmonary Embolism w Contrast   Final Result   IMPRESSION:   1.  No PE.      2.  4.1 cm ascending thoracic aortic aneurysm with no associated dissection.      3.  Enlargement of the central main pulmonary artery likely sequelae of pulmonary arterial hypertension.      4.  Since 12/17/2021, the TAVER is new, otherwise there have been no significant changes.      NM Lexiscan stress test    (Results Pending)     Report per radiology    Laboratory:  Labs Ordered and Resulted from Time of ED Arrival to Time of ED Departure   COMPREHENSIVE METABOLIC PANEL - Abnormal       Result Value    Sodium 136      Potassium 4.5      Carbon Dioxide (CO2) 26      Anion Gap 16 (*)     Urea Nitrogen 17.1      Creatinine 1.05 (*)     GFR Estimate 53 (*)     Calcium 9.7      Chloride 94 (*)     Glucose 101 (*)     Alkaline Phosphatase 75      AST 24      ALT 12      Protein Total 8.0      Albumin 4.6      Bilirubin Total 0.2     TROPONIN T, HIGH SENSITIVITY - Abnormal    Troponin T, High Sensitivity 17 (*)    D DIMER QUANTITATIVE - Abnormal    D-Dimer Quantitative 0.88  (*)    CBC WITH PLATELETS AND DIFFERENTIAL - Abnormal    WBC Count 9.8      RBC Count 3.89      Hemoglobin 11.5 (*)     Hematocrit 36.4      MCV 94      MCH 29.6      MCHC 31.6      RDW 14.9      Platelet Count 320      % Neutrophils 65      % Lymphocytes 22      % Monocytes 11      % Eosinophils 1      % Basophils 1      % Immature Granulocytes 0      NRBCs per 100 WBC 0      Absolute Neutrophils 6.4      Absolute Lymphocytes 2.1      Absolute Monocytes 1.1      Absolute Eosinophils 0.1      Absolute Basophils 0.1      Absolute Immature Granulocytes 0.0      Absolute NRBCs 0.0     NT PROBNP INPATIENT - Normal    N terminal Pro BNP Inpatient 1,010     MAGNESIUM - Normal    Magnesium 1.7              Emergency Department Course & Assessments:          Interventions:  Medications   iopamidol (ISOVUE-370) solution 91 mL (68 mLs Intravenous $Given 11/6/23 1859)   Saline (92 mLs Intravenous $Given 11/6/23 1859)        Assessments:  2201    Independent Interpretation (X-rays, CTs, rhythm strip):  None    Consultations/Discussion of Management or Tests:  None       Social Determinants of Health affecting care:  None     Disposition:  The patient was discharged to home.     Impression & Plan    CMS Diagnoses: None      Medical Decision Making:  Patient presents to the ER for evaluation of a few months of exertional dyspnea.  Vital signs notable for mild hypertension.  On exam she has mild symmetric lower extremity edema.  Lungs are clear to auscultation.  Serial ECG without specific ischemic change.  She is in sinus rhythm.  With ambulation, she does not desaturate.  She does become somewhat symptomatic after returning to her room and walking down the halls of the ER.  She has mild troponin elevation of 17 and did not wish to wait for repeat troponin value given prolonged ER stay.  I think this is reasonable as I have lower suspicion that her mild troponin elevation is related to ACS.  This is likely demand in the setting  of elevated blood pressure and underlying medical comorbidities.  Recent echocardiogram from a few months ago was largely reassuring and CT chest today without PE or pneumonia or pneumothorax or pulmonary edema.  There is concern for pulmonary artery hypertension on CT although this was not obviously documented on the recent echocardiogram.  Patient has previously been seen by cardiology, most recently 8/31/2023.  Despite TAVR, she was reporting some exertional dyspnea at that visit as well at that visit, cardiology discussed the importance of daily aerobic exercise and weight loss.  At that visit, her chronic diastolic heart failure seemed relatively well compensated.  She did have a coronary angiogram June 2022 that showed nonobstructive coronary artery disease.  It seems less likely that patient is suffering from anginal equivalent symptoms, but I will order outpatient stress test and refer patient to pulmonary medicine for further evaluation.  Return precautions discussed prior to discharge.    Diagnosis:    ICD-10-CM    1. Exertional dyspnea  R06.09 Adult Pulmonary Medicine  Referral     NM Lexiscan stress test           Discharge Medications:  Discharge Medication List as of 11/6/2023 10:47 PM                Hans Delacruz MD  11/07/23 0537

## 2023-11-07 NOTE — Clinical Note
Future Appointments 11/7/2023  1:00 PM    Goltz, Bennett Ezra, PA-C  [facility]               [facility] 11/16/2023 1:00 PM    Jin Ackerman MD     [facility]              [facility] 11/21/2023 1:10 PM    [facility] 12/21/2023 2:00 PM    Jin Ackerman MD     [facility]              [facility] 1/24/2024  2:30 PM    Ghazal Rubalcava MD          [facility] 2/12/2024  11:30 AM   CS PULMONARY FUNCTION      [facility]               2/12/2024  1:00 PM    Yoon Hernandez MD     [facility]              CS

## 2023-11-07 NOTE — DISCHARGE INSTRUCTIONS
Return for worsening symptoms or new concerns. You will be contacted regarding scheduling of stress test.  Your evaluation today was negative for blood clots, pneumonias, heart attacks, fluid on the lungs.  A referral to the lung doctors was placed to evaluate your symptoms further.

## 2023-11-09 NOTE — NURSING NOTE
Is the patient currently in the state of [place]? YES - at home.    Visit mode:TELEPHONE    If the visit is dropped, the patient can be reconnected by: TELEPHONE VISIT: Phone number:   Telephone Information:   Mobile [phone]       Will anyone else be joining the visit? No  (If patient encounters technical issues they should call [phone])    How would you like to obtain your AVS? MyChart    Are changes needed to the allergy or medication list? Pt stated no med changes    Reason for visit: JAMEL Scales, VVF

## 2023-11-09 NOTE — PROGRESS NOTES
Virtual Visit Details    Type of service:  Telephone Visit   Phone call duration: *** minutes

## 2023-11-09 NOTE — PROGRESS NOTES
[facility]    A/P                                                    ASSESSMENT/PLAN  Diagnoses and all orders for this visit:  Severe alcohol use disorder, in sustained remission (H)  -     acamprosate (CAMPRAL) 333 MG EC tablet; Take 2 tablets (666 mg) by mouth 3 times daily Start with 1 tablet three times daily, increase to 2 tablets after 2 weeks if not finding improvement  Mild major depression (H24)  -     citalopram (CELEXA) 20 MG tablet; Take 1 tablet (20 mg) by mouth daily  Chronic insomnia  -     gabapentin (NEURONTIN) 600 MG tablet; Take 1 tablet (600 mg) by mouth at bedtime  Chronic pain syndrome  -     gabapentin (NEURONTIN) 600 MG tablet; Take 1 tablet (600 mg) by mouth at bedtime    Orders Placed This Encounter   Medications    acamprosate (CAMPRAL) 333 MG EC tablet     Sig: Take 2 tablets (666 mg) by mouth 3 times daily Start with 1 tablet three times daily, increase to 2 tablets after 2 weeks if not finding improvement     Dispense:  90 tablet     Refill:  1    citalopram (CELEXA) 20 MG tablet     Sig: Take 1 tablet (20 mg) by mouth daily     Dispense:  30 tablet     Refill:  1    gabapentin (NEURONTIN) 600 MG tablet     Sig: Take 1 tablet (600 mg) by mouth at bedtime     Dispense:  30 tablet     Refill:  0       Problem list updated Nov 9, 2023   No problems updated.      Nov 9, 2023  - No alcohol use but has been driving past liquor stores recently  - Start campral d/t recurrent cravings for alcohol  - Increase celexa in 2 weeks - new Rx written. Want to avoid making changes to both campral and celexa at the same time  - Hopeful that improvement in depression helps with cravings as well  - Encouraged her to talk with sleep medicine provider(s) about assuming care of her sleep medication, including trazodone and gabapentin, as having multiple providers managing these medications at the same time becomes risky for accidental dose changes and using incorrect  medications      Last encounter A/P  Sep 21, 2023  - Remains abstinent from alcohol, denies cravings  - Asking about preventing or at least preparing for possible depression this winter. Wondering if restarting celexa would be a reasonable idea. Will send in Rx if she calls and requests this  - For now states mood is stable and feels well overall  - Overall, continued to recommend Linda transition her care back to PCP and/or sleep medicine for med mgmt.  - She indicates she may look for a provider closer to home and has appt's setup over the next couple of months. Will route to current PCP as FYI regarding this transition  - Will reach out to [facility] to see if they will manage sleep medications ongoing. Unclear if they primarily manage or transition to PCP  - Notes doxepin is working great, in combination with trazodone and gabapentin. Has tried belsomra and ambien recently with some efficacy, but does not think she needs these      PDMP Review         Value Time User    State PDMP site checked  Yes 11/9/2023  4:41 PM Jin Ackerman MD              RTC  No follow-ups on file.      Counseled the patient on the importance of having a recovery program in addition to medication to manage recovery.  Components include avoiding isolating, having willingness to change, avoiding triggers and managing cravings. Encouraged having some type of sober network and practicing honesty with trusted support person(s). Encouraged other services such as counseling, 12 step or other self-help organizations.            SUBJECTIVE                                                    Kavitha Headley is a 78 year old female who presents to clinic today for follow up    Visit performed Virtual, via telephone    Time spent on phone call: 15 minutes  Originating Location (pt. Location): Home    Distant Location (provider location):  [facility] office        PHQ-9 Score:       7/15/2022     3:34 PM 7/5/2023  "   10:35 AM 10/25/2023     1:32 PM   PHQ   PHQ-9 Total Score 3 5 10   Q9: Thoughts of better off dead/self-harm past 2 weeks Not at all Not at all Not at all       NITHIN-7 Score:      1/13/2022     1:17 PM 2/2/2022     1:34 PM 2/8/2022     3:35 PM   NITHIN-7 SCORE   Total Score  8 (mild anxiety)    Total Score 8 8 3           Recent HPI Details:  HPI Jul 5, 2023  - Feels \"practically suicidal\" when she cannot sleep. Tries to exercise and garden regularly. Has many supplements she tries to take without success  - No alcohol use since March 2022. Used this partly to help with sleep. Has cravings for alcohol when she is not sleeping. 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 Feels her MH is generally stable/positive but has low energy and gets down about her aches/pains at times. This rarely/never lasts all day. Stopped seroquel without concern d/t weight gain  - Attempted to take gabapentin 1200mg at night for sleep, which has been somewhat effective. Currently taking 600mg but isn't sure if it is helpful  HPI Sep 21, 2023  - Sleep is much, much better  - Taking doxepin and gabapentin and trazodone with great effect  - Also seeing sleep medicine and was trialed on ambien. Feels she does not need both  - Now taking butrans for pain, which has been very helpful for arthritis  - Bought a camper, which was a pretty good experience, but felt very claustrophobic  - Winter is upcoming so today she wants to talk about starting an anti-depressant  - No alcohol use or cravings      TODAY'S VISIT  HPI Nov 9, 2023  - Has struggled recently with depression, which often happens as the winter starts  - Began taking celexa 2 weeks ago. No SE. Has had a positive experience with this in the past, and has struggled with SE from other antidepressants (such as wellbutrin)  - Concerned about pulmonary hypertension and liver damage recently, undergoing workups  - Increased alcohol cravings. \"Circling the " "liquor store\" lately. Has not had any alcohol.  - Wants to increase celexa to improve depression and hopefully improve cravings  - Tried naltrexone in the past, but had negative SE  - Open to campral, as she wants to have fewer cravings and not drink  - F/up with sleep medicine next week, will talk to them       OBJECTIVE                                                    PHYSICAL EXAM:  There were no vitals taken for this visit.    GENERAL: healthy, alert and no distress  RESP: No respiratory distress  MENTAL STATUS EXAM  Appearance/Behavior: No appearant distress  Speech: Normal  Mood/Affect: depressed  Insight: Adequate    LAB  No results found for any visits on 11/09/23.      HISTORY                                                    Problem list reviewed & adjusted, as indicated.  Patient Active Problem List   Diagnosis    Spinal stenosis    Benign essential hypertension    Chronic insomnia    Alcohol use disorder, severe, in early remission (H)    CKD (chronic kidney disease) stage 3, GFR 30-59 ml/min (H)    Knee joint replacement status    Chronic pain syndrome    Bariatric surgery status    Personal history of urinary calculi    Anxiety    Vitamin B12 deficiency (non anemic)    Liver disease, chronic, due to alcohol (H24)    Coronary artery disease involving native coronary artery of native heart without angina pectoris    Mild ascending aorta dilatation (H24)    Psoriasis - on Humira - Dr. Gauri Clark with dermatology    Thiamine deficiency    Alcohol dependence in remission (H)    Cold sore    Gastroesophageal reflux disease with esophagitis - chronic due to alcohol    Enlarged pulmonary artery (H)    Acute respiratory failure with hypoxia (H) - 12/2021 - due to influenza A - persistent issues, ? underlying RAD - seeing [facility] - Dr. Ventura    Chronic diastolic (congestive) heart failure (H)    Mild recurrent major depression (H24)    Non-traumatic rhabdomyolysis    History of aortic valve replacement - " Severe aortic stenosis status post TAVR with 23 mm S3 valve on 8/9/22    Obesity, Class I, BMI 30.0-34.9 (see actual BMI)    Alcohol dependence (H)    Annual physical exam    Alcoholic cirrhosis of liver without ascites (H)    Severe episode of recurrent major depressive disorder, without psychotic features (H)    Shortness of breath    Anemia, unspecified type    Need for prophylactic vaccination and inoculation against influenza         MEDICATION LIST (prior to visit)  acetaminophen (TYLENOL) 500 MG tablet, Take 1,000 mg by mouth 2 times daily as needed for pain  aspirin (ASA) 81 MG EC tablet, Take 81 mg by mouth daily  BETA BLOCKER NOT PRESCRIBED (INTENTIONAL), Please choose reason not prescribed from choices below.  bisacodyl (DULCOLAX) 5 MG EC tablet, Take 5 mg by mouth daily as needed for constipation  buprenorphine (BUTRANS) 5 MCG/HR WK patch, Place 1 patch onto the skin once a week  doxepin (SINEQUAN) 10 MG capsule, Take 2 capsules (20 mg) by mouth at bedtime  folic acid (FOLVITE) 1 MG tablet, Take 1 mg by mouth daily  MISC NATURAL PRODUCTS PO, Take 1 tablet by mouth daily *L-Tryptophan 1000 mg*  Multiple Vitamins-Minerals (CENTRUM SILVER) per tablet, Take 1 tablet by mouth daily  polyethylene glycol (MIRALAX) 17 GM/Dose powder, Take 17 g by mouth 2 times daily as needed for constipation  tiZANidine (ZANAFLEX) 4 MG capsule, Take 4 mg by mouth 3 times daily as needed for muscle spasms  valACYclovir (VALTREX) 1000 mg tablet, TAKE 2 TABLETS (2,000 MG) BY MOUTH AS NEEDED (ONSET OF COLD SORE)  zolpidem ER (AMBIEN CR) 6.25 MG CR tablet, Take 6.25 mg by mouth At Bedtime  [DISCONTINUED] metoprolol succinate ER (TOPROL XL) 25 MG 24 hr tablet, Take 1 tablet (25 mg) by mouth daily    No current facility-administered medications on file prior to visit.      MEDICATION LIST (after visit)  Current Outpatient Medications   Medication    acamprosate (CAMPRAL) 333 MG EC tablet    citalopram (CELEXA) 20 MG tablet     gabapentin (NEURONTIN) 600 MG tablet    acetaminophen (TYLENOL) 500 MG tablet    aspirin (ASA) 81 MG EC tablet    BETA BLOCKER NOT PRESCRIBED (INTENTIONAL)    bisacodyl (DULCOLAX) 5 MG EC tablet    buprenorphine (BUTRANS) 5 MCG/HR WK patch    doxepin (SINEQUAN) 10 MG capsule    folic acid (FOLVITE) 1 MG tablet    losartan (COZAAR) 50 MG tablet    metoprolol tartrate (LOPRESSOR) 25 MG tablet    MISC NATURAL PRODUCTS PO    Multiple Vitamins-Minerals (CENTRUM SILVER) per tablet    polyethylene glycol (MIRALAX) 17 GM/Dose powder    rosuvastatin (CRESTOR) 20 MG tablet    tiZANidine (ZANAFLEX) 4 MG capsule    traZODone (DESYREL) 100 MG tablet    valACYclovir (VALTREX) 1000 mg tablet    zolpidem ER (AMBIEN CR) 6.25 MG CR tablet     No current facility-administered medications for this visit.         Allergies   Allergen Reactions    Bactrim [Sulfamethoxazole-Trimethoprim] Hives    Morphine Itching     NAUSEA    Morphine And Related Itching     NAUSEA       Additional MDM Details:  none    Jin Ackerman MD  [facility]  [phone]

## 2023-11-13 NOTE — TELEPHONE ENCOUNTER
Patient already has a follow up already scheduled with vascular. Patient agreeable to plan.     WILMA Prince  [facility]

## 2023-11-13 NOTE — TELEPHONE ENCOUNTER
----- Message from Echo Dickerson CMA sent at 11/13/2023  9:10 AM CST -----  Routed to Triage to inform patient-out of MA scope of practice.

## 2023-11-14 NOTE — TELEPHONE ENCOUNTER
"Date of Last Office Visit: 11/9/23  Date of Next Office Visit: 12/21/23  No shows since last visit: 0  Cancellations since last visit: 0    Medication requested: traZODone (DESYREL) 100 MG tablet  Date last ordered: 9/5/23 Qty: 30 Refills: 1     Review of [place] ?: NA    Lapse in medication adherence greater than 5 days?: Unknown  If yes, call patient and gather details: na  Medication refill request verified as identical to current order?: yes  Result of Last DAM, VPA, Li+ Level, CBC, or Carbamazepine Level (at or since last visit): N/A    Last visit treatment plan:  Has struggled recently with depression, which often happens as the winter starts  - Began taking celexa 2 weeks ago. No SE. Has had a positive experience with this in the past, and has struggled with SE from other antidepressants (such as wellbutrin)  - Concerned about pulmonary hypertension and liver damage recently, undergoing workups  - Increased alcohol cravings. \"Circling the liquor store\" lately. Has not had any alcohol.  - Wants to increase celexa to improve depression and hopefully improve cravings  - Tried naltrexone in the past, but had negative SE  - Open to campral, as she wants to have fewer cravings and not drink  - F/up with sleep medicine next week, will talk to them        [x]Medication refilled per  Medication Refill in Ambulatory Care  policy.  []Medication unable to be refilled by RN due to criteria not met as indicated below:    []Eligibility - not seen in the last year   []Supervision - no future appointment   []Compliance - no shows, cancellations or lapse in therapy   []Verification - order discrepancy   []Controlled medication   []Medication not included in policy   []90-day supply request   []Other  "

## 2023-11-21 NOTE — PROGRESS NOTES
INITIAL VASCULAR MEDICAL ASSESSMENT  REFERRAL SOURCE: Dr. Rubalcava  REASON FOR CONSULT: 41 mm ATAA    HPI: Kavitha Headley is a 78 year old female with a h/o paroxysmal SVT and PACs, hypertension, hyperlipidemia, obesity s/p gastric bypass surgery, chronic diastolic heart failure, substance use disorder, and severe aortic stenosis s/p TAVR with 23 mm Payne madyson 3 valve (8/9/2022), who presents today for evaluation of her known and stable 41 mm ATAA, which has not grown since 2019. She has no known AAA, iliac aneurysms. Her BP is 153/88 on amlodipine 5 mg daily. She is not on a beta blocker or on an ACE/ARB due to unclear reasons. Her LDL is 133 on no lipid lowering meds.     Review Of Systems  Skin: negative  Eyes: negative  Ears/Nose/Throat: negative  Respiratory: positive for lifestyle limiting shortness of breath with exertion; no weight gain or cough, or hemoptysis  Cardiovascular: negative  Gastrointestinal: negative  Genitourinary: negative  Musculoskeletal: ankle swelling on amlodipine 10 mg daily  Neurologic: negative  Psychiatric: negative  Hematologic/Lymphatic/Immunologic: negative  Endocrine: negative      PAST MEDICAL HISTORY:                  Past Medical History:   Diagnosis Date    Alcohol abuse     Anxiety disorder     Ascending aorta dilatation (H24)     Bariatric surgery status 1996?    gastric bypass, [facility] and    Benign hypertension     Chronic insomnia     Chronic pain syndrome     Chronic back and neck pain, chronic pain due to osteoarthritis multiple joints    Coronary artery disease involving native coronary artery of native heart without angina pectoris 10/16/2018    Minimal coronary artery disease on coronary angiogram in 2015.     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    Major depressive disorder, single episode, severe, without mention of psychotic behavior     Mixed hyperlipidemia     Pelvic relaxation disorder     Surgical intervention for cystocele/rectocele 3,11/2012    Personal history of urinary calculi 06/2006    left ureteral stone,lithotripsy    Psoriasis     Spinal stenosis     Stage III chronic kidney disease (H) 2005       PAST SURGICAL HISTORY:                  Past Surgical History:   Procedure Laterality Date    APPENDECTOMY  3/2004    incidental    ARTHRODESIS TOE(S) Right 1/31/2020    Procedure: RIGHT FIRST METATARSAL PHALANGEAL JOINT ARTHRODESIS;  Surgeon: Steven Reyes MD;  Location:  OR    C MEDIASTINOSCOPY W OR WO BIOPSY  2/2008    Videomediastinoscopy and, for mediastinal adenopathy -reactive lymphoid hyperplasia    CARPAL TUNNEL RELEASE RT/LT  10/2010    Carpometacarpal excisional arthroplasty with a fascial autograft and APL suspension sling (28048). 2. Left thumb metacarpophalangeal joint fusion with autologous bone graft (74027). 3. Left endoscopic carpal tunnel release     CHOLECYSTECTOMY, LAPOROSCOPIC  11/2010    Cholecystectomy, Laparoscopic    COLONOSCOPY N/A 9/8/2016    Procedure: COMBINED COLONOSCOPY, SINGLE OR MULTIPLE BIOPSY/POLYPECTOMY BY BIOPSY;  Surgeon: Moe Barlow MD;  Location:  GI    CV CORONARY ANGIOGRAM N/A 6/20/2022    Procedure: Coronary Angiogram;  Surgeon: Nora Parker MD;  Location:  HEART CARDIAC CATH LAB    CV PCI N/A 6/20/2022    Procedure: Percutaneous Coronary Intervention;  Surgeon: Nora Parker MD;  Location:  HEART CARDIAC CATH LAB    CV RIGHT HEART CATH MEASUREMENTS RECORDED N/A 6/20/2022    Procedure: Right Heart Catheterization;  Surgeon: Nora Parker MD;  Location:  HEART CARDIAC CATH LAB    CV TRANSCATHETER AORTIC VALVE REPLACEMENT-FEMORAL APPROACH N/A 8/9/2022    Procedure: Transcatheter Aortic Valve Replacement-Femoral Approach;  Surgeon:  Nora Parker MD;  Location:  HEART CARDIAC CATH LAB    CYSTOCELE REPAIR  11/2012    davinci laparoscopic sacrocolpopexy, enterocele repair, lysis of adhesions, placement of retropubic mid urethral sling, cystoscopy    CYSTOSCOPY, LITHOTRIPSY, COMBINED  6/2006    Left extracorporeal shock wave lithotripsy, cystoscopy, left ureteral stent placement.    CYSTOSCOPY, REMOVE STENT(S), COMBINED  7/2006    Cystoscopy, removal of left ureteral stent, retrograde pyelography, flexible and rigid ureteroscopy and holmium laser lithotripsy, basket removal of stone fragments, ureteral stent placement.     ENDOSCOPIC ULTRASOUND UPPER GASTROINTESTINAL TRACT (GI) N/A 6/12/2017    Procedure: ENDOSCOPIC ULTRASOUND, ESOPHAGOSCOPY / UPPER GASTROINTESTINAL TRACT (GI);  ENDOSCOPIC ULTRASOUND, ESOPHAGOSCOPY / UPPER GASTROINTESTINAL TRACT (GI);  Surgeon: Parth Graham MD;  Location:  GI    HERNIA REPAIR  4/2012    bilateral augmentation mastopexy, ventral hernia repair, and medial thigh liposuction on 04/06/2012.     HYSTERECTOMY VAGINAL, BILATERAL SALPINGO-OOPHERECTOMY, COMBINED  1998    due to myoma and bleeding    JOINT REPLACEMENT, HIP RT/LT  4/2004    right total hip arthroplasty    LAPAROTOMY, LYSIS ADHESIONS, COMBINED  3/2004    lysis adhesions, ventral hernia repair, appendectomy incidentally    LYMPH NODE BIOPSY  4/2008    right axillary, reactive follicular and paracortical hyperplasia.    MAMMOPLASTY AUGMENTATION BILATERAL  4/2012    REPAIR HAMMER TOE Right 1/31/2020    Procedure: WITH SECOND AND THIRD CLAW TOE RECONSTRUCTION;  Surgeon: Steven Reyes MD;  Location:  OR    REVISE RECONSTRUCTED BREAST  6/7/2012    Left breast capsulotomy.     ZZ GASTRIC BYPASS,OBESE<100CM ARIANNA-EN-Y  1996    [facility] REPAIR OF RECTOCELE  3/2012    [facility] TOTAL KNEE ARTHROPLASTY  12/2005    left        CURRENT MEDICATIONS:                  Current Outpatient Medications   Medication Sig Dispense Refill    acamprosate (CAMPRAL) 333 MG  EC tablet Take 2 tablets (666 mg) by mouth 3 times daily Start with 1 tablet three times daily, increase to 2 tablets after 2 weeks if not finding improvement 90 tablet 1    acetaminophen (TYLENOL) 500 MG tablet Take 1,000 mg by mouth 2 times daily as needed for pain      amLODIPine (NORVASC) 10 MG tablet Take 1 tablet (10 mg) by mouth daily 90 tablet 3    aspirin (ASA) 81 MG EC tablet Take 81 mg by mouth daily      BETA BLOCKER NOT PRESCRIBED (INTENTIONAL) Please choose reason not prescribed from choices below.      bisacodyl (DULCOLAX) 5 MG EC tablet Take 5 mg by mouth daily as needed for constipation      buprenorphine (BUTRANS) 5 MCG/HR WK patch Place 1 patch onto the skin once a week      [START ON 11/22/2023] citalopram (CELEXA) 20 MG tablet Take 1 tablet (20 mg) by mouth daily 30 tablet 1    doxepin (SINEQUAN) 10 MG capsule Take 2 capsules (20 mg) by mouth at bedtime 60 capsule 0    folic acid (FOLVITE) 1 MG tablet Take 1 mg by mouth daily      gabapentin (NEURONTIN) 600 MG tablet Take 1 tablet (600 mg) by mouth at bedtime 30 tablet 0    MISC NATURAL PRODUCTS PO Take 1 tablet by mouth daily *L-Tryptophan 1000 mg*      Multiple Vitamins-Minerals (CENTRUM SILVER) per tablet Take 1 tablet by mouth daily      polyethylene glycol (MIRALAX) 17 GM/Dose powder Take 17 g by mouth 2 times daily as needed for constipation      tiZANidine (ZANAFLEX) 4 MG capsule Take 4 mg by mouth 3 times daily as needed for muscle spasms      traZODone (DESYREL) 100 MG tablet TAKE 1 TABLET BY MOUTH EVERYDAY AT BEDTIME 30 tablet 1    valACYclovir (VALTREX) 1000 mg tablet TAKE 2 TABLETS (2,000 MG) BY MOUTH AS NEEDED (ONSET OF COLD SORE) 4 tablet 0    zolpidem ER (AMBIEN CR) 6.25 MG CR tablet Take 6.25 mg by mouth At Bedtime         ALLERGIES:                  Allergies   Allergen Reactions    Bactrim [Sulfamethoxazole-Trimethoprim] Hives    Morphine Itching     NAUSEA    Morphine And Related Itching     NAUSEA       SOCIAL HISTORY:                   Social History     Socioeconomic History    Marital status:      Spouse name: Mt    Number of children: 4    Years of education: 18    Highest education level: Not on file   Occupational History    Occupation: nurse     Employer: [employer]     Employer: RETIRED   Tobacco Use    Smoking status: Never    Smokeless tobacco: Never   Substance and Sexual Activity    Alcohol use: Not Currently     Alcohol/week: 63.0 standard drinks of alcohol     Types: 63 Standard drinks or equivalent per week    Drug use: No    Sexual activity: Yes     Partners: Male   Other Topics Concern     Service Not Asked    Blood Transfusions No    Caffeine Concern Yes     Comment: 1-2 cups per day     Occupational Exposure Yes     Comment: blood    Hobby Hazards No    Sleep Concern Yes    Stress Concern Yes    Weight Concern Yes     Comment: gastric  byepass    Special Diet No    Back Care No    Exercise Yes     Comment: walk, swin    Bike Helmet No    Seat Belt Yes    Self-Exams Yes    Parent/sibling w/ CABG, MI or angioplasty before 65F 55M? Not Asked   Social History Narrative    Not on file     Social Determinants of Health     Financial Resource Strain: Low Risk  (10/25/2023)    Financial Resource Strain     Within the past 12 months, have you or your family members you live with been unable to get utilities (heat, electricity) when it was really needed?: No   Food Insecurity: Low Risk  (10/25/2023)    Food Insecurity     Within the past 12 months, did you worry that your food would run out before you got money to buy more?: No     Within the past 12 months, did the food you bought just not last and you didn t have money to get more?: No   Transportation Needs: Low Risk  (10/25/2023)    Transportation Needs     Within the past 12 months, has lack of transportation kept you from medical appointments, getting your medicines, non-medical meetings or appointments, work, or from getting things that you need?: No    Physical Activity: Not on file   Stress: Not on file   Social Connections: Not on file   Interpersonal Safety: Not on file   Housing Stability: Low Risk  (10/25/2023)    Housing Stability     Do you have housing? : Yes     Are you worried about losing your housing?: No       FAMILY HISTORY:                   Family History   Problem Relation Age of Onset    Substance Abuse Father     Cancer Father         throat and lung mets    Diabetes No family hx of     Coronary Artery Disease No family hx of     Cerebrovascular Disease No family hx of          Physical exam Reveals:    O/P: WNL  HEENT: WNL  NECK: No JVD, thyromegaly, or lymphadenopathy; positive left carotid bruit  HEART: RRR, no murmurs, gallops, or rubs  LUNGS: CTA bilaterally without rales, wheezes, or rhonchi  GI: NABS, nondistended, nontender, soft  EXT:without cyanosis, clubbing, or edema  NEURO: nonfocal  : no flank tenderness      Rt femoral: 3 plus palpable    Rt popliteal: 4 plus palpable       Lt femoral: 3 plus palpable   Lt popliteal: 4 plus palpable             EXAM: CT CHEST PULMONARY EMBOLISM W CONTRAST  LOCATION: [facility]  DATE: 11/6/2023     INDICATION: Short of breath, worsening, elevated D-dimer  COMPARISON: CT 12/17/2021  TECHNIQUE: CT chest pulmonary angiogram during arterial phase injection of IV contrast. Multiplanar reformats and MIP reconstructions were performed. Dose reduction techniques were used.   CONTRAST: 68 mL Isovue 370     FINDINGS:  ANGIOGRAM CHEST: The main pulmonary artery is enlarged measuring up to 3.9 cm in greatest radial dimension which can be associated with changes of pulmonary arterial hypertension. There is no evidence for pulmonary emboli. Thoracic aorta is negative for   dissection, but there is 4.1 cm ascending aortic aneurysmal dilatation seen. There is a TAVER.  Mild reflux of contrast into the IVC which can be associated with mild increased right heart pressure/strain.      LUNGS AND PLEURA: There is a benign calcified granuloma in the right upper lobe.     MEDIASTINUM/AXILLAE: Benign calcified right hilar lymph nodes which should be associated with the benign calcified granuloma in the right upper lobe.     CORONARY ARTERY CALCIFICATION: Mild.     UPPER ABDOMEN: Prior postoperative changes of the GI tract with no complications identified. Mild atrophy of the left kidney.     MUSCULOSKELETAL: Bilateral breast implants with no evidence for rupture. Moderate to advanced multilevel degenerative changes, most marked in the lower thoracic spine.                                                                      IMPRESSION:  1.  No PE.     2.  4.1 cm ascending thoracic aortic aneurysm with no associated dissection.     3.  Enlargement of the central main pulmonary artery likely sequelae of pulmonary arterial hypertension.     4.  Since 12/17/2021, the TAVER is new, otherwise there have been no significant changes.        Procedure: CT ANGIOGRAM TAVR   Examination Date: 6/28/2022 4:41 PM   Indication: Severe aortic stenosis, Pre TAVR  Ordering Provider: CRISTI MOTT     TECHNIQUE: ECG gated CT of the heart, aorta and nongated CT of the  chest abdomen pelvis without and with IV contrast Isovue 115 mL was  performed per the HIEU protocol on a Siemens Dual Source Flash scanner  without incident. A noncontrast CT of the chest abdomen and pelvis was  performed followed by contrast-enhanced CTA of the heart in a spiral  gated mode with limited field-of-view followed by gated high pitch  spiral CTA of the chest, abdomen, pelvis at 100/120 kVP to evaluate  the thoracoabdominal aorta and iliac vasculature. Scan protocol was  optimized to minimize radiation exposure. The total radiation exposure  was 960 DLP and 13.44 mSv. Images were reconstructed and analyzed on a  [employer] Workstation.                                                                       IMPRESSION:  1.  No acute aortic  pathology like dissection, intramural hematoma or  contained rupture noted.  2.  Please review detailed radiology report, to follow separately, for  incidental non-cardiovascular findings.     FINDINGS:     1.  Cuspid aortic valve. Aortic valve calcium score is 736. The  ascending aorta is mild calcified, aortic arch is  mild calcified, the  descending thoracic aorta is mild calcified.   2.  The arch vessel branching pattern is normal.   3.  The suprarenal abdominal aorta is mild calcified; infrarenal  abdominal aorta is mild calcified  4.  Widely patent origins of celiac trunk, superior mesenteric artery  and inferior mesenteric artery.  Single bilateral renal arteries with  widely patent origins.   5.  There is no acute aortic pathology, such as dissection, intramural  hematoma, or contained rupture.      MEASUREMENTS:   Representative dimensions of the thoracoabdominal aorta are as  follows:     1. AORTIC ANNULUS MEASUREMENTS:     *  The average aortic annulus diameter is 21.8 mm,   *  Aortic annulus area is 3.75 cm2  *  Aortic annulus perimeter is 72.2 mm  *  The 3 cusp coaxial angle for aortic valve is ([place]an 23 , CRA 6) these   angles will vary depending upon the patient's body position  *  Left main - Annulus distance: 10.8 mm, RCA - Annulus distance: 15.1  mm     2. LVOT MEASUREMENTS:     *  The average LVOT diameter is 20.8 mm  *  LVOT area is 3.42 cm2  *  LVOT perimeter is 68.7 mm  *  LVOT calcification none mm     3. AORTA MEASUREMENTS     1.  The aortic root at the sinuses of Valsalva (L/R/N) 31.4 mm x  27.6  mm 29.4 mm  2.  The sinotubular junction:  27 mm x 26.3 mm  3.  Sinotubular junction height: 18.4 mm x 18.9 mm  4.  Proximal ascending aorta: 42.2 mm x 39.9 mm  5.  Distal abdominal aorta proximal to the bifurcation: 15.3 mm x 14.2  mm     4. ILIOFEMORAL MEASUREMENTS:     RIGHT:  1.  Right common iliac artery: 9.09 mm x 9.96 mm   2.  Right external iliac artery: 6.14 mm x 7.65 mm   3.  Right common  femoral artery: 5.36 mm x 7.83 mm   4.  Tortuosity: mild   5.  Calcification: mild      LEFT:  1.  Left common iliac artery: 7.32 mm x  9.06 mm  2.  Left external iliac artery: 6.17 mm x 7.45 mm  3.  Left common femoral artery: 5.71 mm x 11.48 mm  4.  Tortuosity: mild   5.  Calcification: mild      7. Aortic Root Angle 47.3 degrees     8. Innominate Artery :  14.2 mm X 13mm     9. Left Carotid Artery:  8.12mm X 7.4 mm     OTHER FINDINGS:   1.  Large epicardial fat-pad cannot rule out effusion.  2.  Please review radiology review for incidental non-cardiovascular  findings including lungs, abdominal organs, bone, soft tissue, nodes  to follow separately     JOSSELINE DE LOS SANTOS MD         CTA CHEST WITH CONTRAST   7/12/2019 1:00 PM      HISTORY: Aortic disease, nontraumatic, known or suspected; aortic  valve disease; other nonrheumatic aortic valve disorders.     TECHNIQUE:  CT of the chest was performed following the administration  of 90 mL Isovue-370. Radiation dose for this scan was reduced using  automated exposure control, adjustment of the mA and/or kV according  to patient size, or iterative reconstruction technique.     COMPARISON: CT chest dated 9/10/2010.     FINDINGS:    Vascular examination:  The thoracic aorta at the level of the sinuses  of Valsalva has a maximum diameter of approximately 3.4 cm. The  maximum diameter of the ascending thoracic aorta is approximately 4.1  cm. The diameter at the level of the mid arch is approximately 2.3 cm.  The diameters of the proximal, mid, and distal descending thoracic  aorta are 2.3, 1.9, and 1.9 cm respectively.     The thoracic aortic takeoff vessels are patent without significant  stenoses.     The celiac trunk, superior mesenteric artery, and renal arteries are  patent. Calcified plaque obscures the origin of the left renal artery.     Soft tissues: There are multiple small mediastinal lymph nodes. There  is a calcified granuloma in the right upper lobe. There is  atelectasis  in the left lower lobe just posterior to the left major fissure. There  are postsurgical changes involving the stomach. Patient is status post  a cholecystectomy.                                                                      IMPRESSION: Mild dilatation of the ascending thoracic aorta which has  a maximum diameter of approximately 4.1 cm.     CONOR VANG MD      Component      Latest Ref Rng 9/25/2023  2:42 PM   Cholesterol      <200 mg/dL 259 (H)    Triglycerides      <150 mg/dL 140    HDL Cholesterol      >=50 mg/dL 98    LDL Cholesterol Calculated      <=100 mg/dL 133 (H)    Non HDL Cholesterol      <130 mg/dL 161 (H)       Legend:  (H) High          A/P:      (I71.21) Aneurysm of ascending aorta without rupture (H24)  (primary encounter diagnosis)  Comment: This is 41 mm in size and is not growing. Her SBP is elevated > 120/70  on amlodipine 10 mg daily. Her LDL-C is 131 on no meds. She does not currently require surgical intervention.   Plan: Annual surveillance of ATAA with CTA chest in one year. Consider TTE thereafter if no growth. Order at subsequent visits. Obtian better BP control and lipid control (see below) .    (R09.89) Abnormal peripheral pulse  Comment: increased popliteal pulses  Plan: Check CTA abd/pelvis with runoff. Cr 1.05. GFR 53/ Will need oral hydration. Has a label of CHF in the chart. Last EF was 60-65%. She would be able to tolerate oral hydration.           (I10) Essential hypertension  Comment: not at goal. Having ankle swelling on amlodipine.  Plan:Stop amlodipine, start losartan (COZAAR) 50 MG tablet, metoprolol         tartrate (LOPRESSOR) 25 MG tablet        RTC 3 to 4 weeks for BP check     (E78.5) Hyperlipidemia LDL goal <70  Comment: not at goal  Plan: start rosuvastatin (CRESTOR) 20 MG tablet        Check advanced lipid testing around 2/21/24, order at next visit.    (R09.89) Left carotid bruit  Comment: Likely referred sound form prosthetic murmur  Plan:  US Carotid Bilateral            (G47.33) JENN (obstructive sleep apnea)  Comment: She has concerns regarding pulmonary htn since her PA size was increased on 11/06 CT PE protocol chest. She has been dxd with JENN and is not yet using a mouthguard or CPAP mask  Plan: Treat JENN,    (I27.20) Pulmonary hypertension (H)  Comment: As above  Plan: Treat JENN. See GRULLON below. If that w/u is unrevealing, and wearing CPAP/mouthguard for three months does not improve sxs, refer to pulmonary htn clinic.    (R06.09) Dyspnea on exertion  Comment: Lifestyle limiting. Normal lung exam. Normal EF on TTE. Check the below. I suspect she is merely deconditioned. If PFTs and six minute walk test are normal, consider referral to pulmonary htn clinic.   Plan: General PFT Lab (Please always keep checked),         Pulmonary Function Test                    80 minutes total medical care on today's date.

## 2023-11-21 NOTE — PROGRESS NOTES
[facility]        Patient is here for a consult.    Pt is currently taking Aspirin.    BP (!) 147/70 (BP Location: Left arm, Patient Position: Chair, Cuff Size: Adult Regular)   Pulse 94   Wt 184 lb 6.4 oz (83.6 kg)   SpO2 97%   BMI 33.73 kg/m      The provider has been notified that the patient has no concerns.     Questions patient would like addressed today are: N/A.    Refills are needed: N/A    Has homecare services and agency name:  Domenica Hammonds MA

## 2023-11-27 NOTE — TELEPHONE ENCOUNTER
[facility]    Who is the name of the provider?:  SAVANNAH ANDRES   What is the location you see this provider at/preferred location?: [place]  Person calling / Facility: Kavitha Quijanoer  Phone number:  [phone] (home)  Nurse call back needed:  NO    Reason for call:  Patient has active requests for US and CT. Please advise on any additional scheduling items and follow up timing / plan.    Pharmacy location:     Outside Imaging: n/a   Can we leave a detailed message on this number?  YES

## 2023-11-27 NOTE — TELEPHONE ENCOUNTER
Future Appointments   Date Time Provider Department Center   12/6/2023  2:40 PM SHCT1 SHCT [facility]   12/6/2023  3:10 PM SHUS5 SHULT [facility]   2/12/2024 11:30 AM CS PULMONARY FUNCTION CSPULM CS     Transferred patient to be scheduled for 6 min walk test.     Patient needs two follow ups per nursing: one after CT, US, and walk test are scheduled. Another after pulmonary function tests and consult in February.

## 2023-11-27 NOTE — TELEPHONE ENCOUNTER
Per 11/21 office visit note: Check CTA abd/pelvis with runoff. Cr 1.05. GFR 53/ Will need oral hydration. Has a label of CHF in the chart. Last EF was 60-65%. She would be able to tolerate oral hydration.      Please arrange for:    CTA abdomen/pelvis with runoff.    Please instruct on oral hydration.  Bilateral carotid ultrasound  Pulmonary function tests  6 minute walk test  In clinic visit one week after all are complete.     Private car

## 2023-11-28 NOTE — CONFIDENTIAL NOTE
DIAGNOSIS:     Liver disease, chronic, due to alcohol (H24)   Liver fibrosis      Appt Date: 02.29.2024    NOTES STATUS DETAILS   OFFICE NOTE from referring provider Internal 11.06.2023 Ghazal Rubalcava MD    OFFICE NOTES from other specialists Internal 09.12.2023 Nayeli Castorena PA-C    DISCHARGE SUMMARY from hospital     MEDICATION LIST Internal    LIVER BIOSPY (IF APPLICABLE)      PATHOLOGY REPORTS      IMAGING     ENDOSCOPY (IF AVAILABLE) Received 08.24.2022   COLONOSCOPY (IF AVAILABLE) Received 07.15.2022   ULTRASOUND LIVER Internal 11.02.2023 US ABDOMEN LIMITED    CT OF ABDOMEN     MRI OF LIVER     FIBROSCAN, US ELASTOGRAPHY, FIBROSIS SCAN, MR ELASTOGRAPHY     LABS     HEPATIC PANEL (LIVER PANEL) Internal 03.24.2021   BASIC METABOLIC PANEL Internal 10.25.2023    COMPLETE METABOLIC PANEL Internal 11.06.2023    COMPLETE BLOOD COUNT (CBC) Internal 11.06.2023    INTERNATIONAL NORMALIZED RATIO (INR) Internal 08.02.2022   HEPATITIS C ANTIBODY     HEPATITIS C VIRAL LOAD/PCR     HEPATITIS C GENOTYPE     HEPATITIS B SURFACE ANTIGEN     HEPATITIS B SURFACE ANTIBODY     HEPATITIS B DNA QUANT LEVEL     HEPATITIS B CORE ANTIBODY

## 2023-12-08 NOTE — TELEPHONE ENCOUNTER
Date of Last Office Visit: 11/9/2023  Date of Next Office Visit: 12/21/2023  No shows since last visit: None  Cancellations since last visit: None (11/16/2023 appt was rescheduled for earlier appt on 11/9/2023)    Medication requested: doxepin 10 mg capsule Date last ordered: 11/2/2023 Qty: 60 Refills: 0     Review of [place] ?: N/A    Lapse in medication adherence greater than 5 days?: No  If yes, call patient and gather details: N/A  Medication refill request verified as identical to current order?: Yes  Result of Last DAM, VPA, Li+ Level, CBC, or Carbamazepine Level (at or since last visit): N/A    Last visit treatment plan:   ASSESSMENT/PLAN  Diagnoses and all orders for this visit:  Severe alcohol use disorder, in sustained remission (H)  -     acamprosate (CAMPRAL) 333 MG EC tablet; Take 2 tablets (666 mg) by mouth 3 times daily Start with 1 tablet three times daily, increase to 2 tablets after 2 weeks if not finding improvement  Mild major depression (H24)  -     citalopram (CELEXA) 20 MG tablet; Take 1 tablet (20 mg) by mouth daily  Chronic insomnia  -     gabapentin (NEURONTIN) 600 MG tablet; Take 1 tablet (600 mg) by mouth at bedtime  Chronic pain syndrome  -     gabapentin (NEURONTIN) 600 MG tablet; Take 1 tablet (600 mg) by mouth at bedtime          Orders Placed This Encounter   Medications    acamprosate (CAMPRAL) 333 MG EC tablet       Sig: Take 2 tablets (666 mg) by mouth 3 times daily Start with 1 tablet three times daily, increase to 2 tablets after 2 weeks if not finding improvement       Dispense:  90 tablet       Refill:  1    citalopram (CELEXA) 20 MG tablet       Sig: Take 1 tablet (20 mg) by mouth daily       Dispense:  30 tablet       Refill:  1    gabapentin (NEURONTIN) 600 MG tablet       Sig: Take 1 tablet (600 mg) by mouth at bedtime       Dispense:  30 tablet       Refill:  0         Problem list updated Nov 9, 2023   No problems updated.        Nov 9, 2023  - No alcohol use but has been  driving past liquor stores recently  - Start campral d/t recurrent cravings for alcohol  - Increase celexa in 2 weeks - new Rx written. Want to avoid making changes to both campral and celexa at the same time  - Hopeful that improvement in depression helps with cravings as well  - Encouraged her to talk with sleep medicine provider(s) about assuming care of her sleep medication, including trazodone and gabapentin, as having multiple providers managing these medications at the same time becomes risky for accidental dose changes and using incorrect medications        Last encounter A/P  Sep 21, 2023  - Remains abstinent from alcohol, denies cravings  - Asking about preventing or at least preparing for possible depression this winter. Wondering if restarting celexa would be a reasonable idea. Will send in Rx if she calls and requests this  - For now states mood is stable and feels well overall  - Overall, continued to recommend Linda transition her care back to PCP and/or sleep medicine for med mgmt.  - She indicates she may look for a provider closer to home and has appt's setup over the next couple of months. Will route to current PCP as FYI regarding this transition  - Will reach out to [facility] to see if they will manage sleep medications ongoing. Unclear if they primarily manage or transition to PCP  - Notes doxepin is working great, in combination with trazodone and gabapentin. Has tried belsomra and ambien recently with some efficacy, but does not think she needs these        PDMP Review           Value Time User     State PDMP site checked  Yes 11/9/2023  4:41 PM Jin Ackerman MD                   RTC  No follow-ups on file.    [x]Medication refilled per  Medication Refill in Ambulatory Care  policy.  []Medication unable to be refilled by RN due to criteria not met as indicated below:    []Eligibility - not seen in the last year   []Supervision - no future appointment   []Compliance - no  shows, cancellations or lapse in therapy   []Verification - order discrepancy   []Controlled medication   []Medication not included in policy   []90-day supply request   []Other

## 2023-12-10 NOTE — TELEPHONE ENCOUNTER
COVID Positive/Requesting COVID treatment    Patient is positive for COVID and requesting treatment options.    Date of positive COVID test (PCR or at home)? Today via home test.  Current COVID symptoms: fatigue and congestion or runny nose, patient reported that she can barely walk. At baseline she has no issues walking, but states that she now has to hold onto things, and feels unstable when walking. Triaged for weakness.    Disposition: Call 911. Patient refused, and said she just had a physical and everything was fine. RN told patient that since this is a new symptom for her and she is barely able to walk, this requires immediate medical attention. Patient refused and said her  is going to take her to .    Jasmin Olea RN on 12/10/2023 at 4:03 PM     Reason for Disposition   [1] SEVERE weakness (i.e., unable to walk or barely able to walk, requires support) AND [2] new-onset or worsening    Additional Information   Negative: SEVERE difficulty breathing (e.g., struggling for each breath, speaks in single words)   Negative: Shock suspected (e.g., cold/pale/clammy skin, too weak to stand, low BP, rapid pulse)   Negative: Difficult to awaken or acting confused (e.g., disoriented, slurred speech)   Negative: [1] Fainted > 15 minutes ago AND [2] still feels too weak or dizzy to stand    Protocols used: Weakness (Generalized) and Fatigue-A-AH

## 2023-12-11 PROBLEM — M62.82 NON-TRAUMATIC RHABDOMYOLYSIS: Status: RESOLVED | Noted: 2022-03-10 | Resolved: 2023-01-01

## 2023-12-11 PROBLEM — Z00.00 ANNUAL PHYSICAL EXAM: Status: RESOLVED | Noted: 2023-01-01 | Resolved: 2023-01-01

## 2023-12-11 PROBLEM — F10.21 ALCOHOL DEPENDENCE IN REMISSION (H): Status: RESOLVED | Noted: 2020-01-28 | Resolved: 2023-01-01

## 2023-12-11 PROBLEM — E66.01 MORBID (SEVERE) OBESITY DUE TO EXCESS CALORIES (H): Status: ACTIVE | Noted: 2022-03-16

## 2023-12-11 PROBLEM — F33.0 MILD RECURRENT MAJOR DEPRESSION (H): Status: RESOLVED | Noted: 2022-02-08 | Resolved: 2023-01-01

## 2023-12-11 PROBLEM — Z23 NEED FOR PROPHYLACTIC VACCINATION AND INOCULATION AGAINST INFLUENZA: Status: RESOLVED | Noted: 2023-01-01 | Resolved: 2023-01-01

## 2023-12-11 PROBLEM — E66.811 OBESITY, CLASS I, BMI 30.0-34.9 (SEE ACTUAL BMI): Status: RESOLVED | Noted: 2023-01-01 | Resolved: 2023-01-01

## 2023-12-11 PROBLEM — Z98.84 H/O GASTRIC BYPASS: Status: ACTIVE | Noted: 2017-07-25

## 2023-12-11 NOTE — PATIENT INSTRUCTIONS
Reported tested positive for COVID with upper respiratory symptoms, dry cough, headache, sore throat body ache that started yesterday morning and tested positive for COVID yesterday afternoon.  Currently meets criteria for risk of severe illness from COVID and would benefit from medication like Paxlovid.  Reviewed quarantine.  I reviewed supportive care, OTC meds to use if needed, expected course, and signs of concern.  Follow up as needed or if does not improve within 1 week(S) or if worsens in any way.  Reviewed red flag symptoms and is to go to the ER if experiences any of these.  Paxlovid renal adjusted dose given take twice a day for 5 days as directed on package sent to pharmacy of [facility] in [place].  No longer on Butrans and not requiring Narcan.  No longer on L-tryptophan.  Clarified medications.  Recommend not taking the zolpidem for the 5 days while on Paxlovid.  Decrease trazodone to half of the 100 mg tablet while on the Paxlovid.  Hold the Crestor for the 5 days on Paxlovid.  Monitor blood pressure pulse oxygen while on the medication and ill with COVID.  May continue rest of meds as advised previously.  If having worsening symptoms or questions or concerns please contact your primary care provider.  The above note was dictated using voice recognition. Although reviewed after completion, some word and grammatical error may remain .         For informational purposes only. Not to replace the advice of your health care provider. Copyright   2022 [facility]. All rights reserved. Clinically reviewed by Jessica Lepe, PharmD, BCACP. [employer] [id number] - REV 06/23.  COVID-19 Outpatient Treatments  Your care team can help you find the best treatments for COVID-19. Talk to a health care provider or refer to the FDA medicine fact sheets below.  Paxlovid (nirmatrelvir and ritonavir): https://www.paxlovid.com/resources  Molnupiravir (Lagevrio):  https://www.fda.gov/media/281667/download  Important: We can only prescribe Paxlovid or Molnupiravir when it can be started within 5 days of first having symptoms.  Paxlovid (nimatrelvir and ritonavir)  How it works  Two medicines (nirmatrelvir and ritonavir) are taken together. They stop the virus from growing. Less amount of virus is easier for your body to fight.  Benefits  Lowers risk of a hospital stay or death from COVID-19.  How to take  Medicine comes in a daily container with both medicine tablets. Take by mouth twice daily (once in the morning, once at night) for 5 days.  The number of tablets to take varies by patient.  Don't chew or break capsules. Swallow whole.  When to take  Take it as soon as possible and within 5 days of your first symptoms.  Who can take it  Patients must be 12 years or older weigh at least 88 pounds. Paxlovid is the preferred treatment for pregnant patients.  Possible side effects  Can cause altered sense of taste, diarrhea (loose, watery stools), high blood pressure, muscle aches.  Medicine conflicts  Some medicines may conflict with Paxlovid and may cause serious side effects.  Tell your care team about all the medicines you take, including prescription and over-the-counter medicines, vitamins, and herbal supplements.  Your care team will review your medicines to make sure that you can safely take Paxlovid.  Cautions  Paxlovid is not advised for patients with severe kidney or liver disease. If you have kidney or liver problems, the dose may need to be changed.  If you're pregnant or breastfeeding, talk to your care team about your options.  If you take hormonal birth control (such as the Pill), then you or your partner should also use a non-hormonal form of birth control (such as a condom). Keep doing this for 1 menstrual cycle after your last dose of Paxlovid.  Molnupiravir (lagevrio)  How it works  Stops the virus from growing. Less amount of virus is easier for your body to  fight.  Benefits  Lowers risk of a hospital stay or death from COVID-19.  How to take  Take 4 capsules by mouth every 12 hours (4 in the morning and 4 at night) for 5 days. Don't chew or break capsules. Swallow whole.  When to take  Take as soon as possible and within 5 days of your first symptoms.  Who can take it  Patients must be 18 years or older.   Possible side effects  Diarrhea (loose, watery stools), nausea (feeling sick to your stomach), dizziness, headaches.  Medicine conflicts  Right now, there are no known conflicts with other drugs. But tell your care team about all medicines you take.  Cautions  This medicine is not advised for patients who are pregnant.  If you are someone who could become pregnant, use trusted birth control until 4 days after your last dose of molnupiravir.  If your partner could become pregnant, you should use trusted birth control until 3 months after your last dose of molnupiravir.      Coping with Life After COVID-19  Being in the hospital because of COVID-19 is scary. Going home can be scary, too. You may face changes to your life, the way you work or what you can eat. It s hard to adjust to change, and it s normal to feel afraid, frustrated or even angry. These feelings usually go away over time. If your feelings don t start to get better, it s called  adjustment disorder.      What signs should I look out for?  Adjustment disorder can happen to anyone in a time of stress. It makes it hard to cope with daily life. You may feel lonely or fight with loved ones, even if you re glad to be home. Watch for these signs:  Fear or worry  Hard time focusing  Sadness or anger  Trouble talking to family or friends  Feeling like you don t fit in or isolating yourself  Problems with sleep   Drinking alcohol or taking drugs to cope    What can I do?  You can help yourself get better. Feeling you have control helps you move forward. You may wonder if you ll be able to do things you did before.  Be patient. Do your best to make the most of every day. Try to build relationships, be as active as you can, eat right and keep a sense of humor. Avoid smoking and drinking too much alcohol. Call your family doctor or clinic if you re not sure what to do. They can guide you to care or other services.    When should I get help?  Think about getting counseling if your sadness or frustration gets worse. Together with a trained counselor, you can talk about your problems adjusting to life after your hospital stay. You can come up with new ways to handle changes that give you more control. Your family doctor or care team can help you find a counselor.     Get help if you re thinking about hurting yourself. If you need help right away, call 911 or go to the nearest emergency room. You can also try the Crisis Text Line.    Crisis Text Line: 430-530 (http://www.crisistextline.org)  The Crisis Text Line serves anyone, in any crisis. It gives free, 24/7 support. Here's how it works:  Text HOME to 308467 from anywhere in the [place], anytime, about any type of crisis.  A live, trained Crisis Counselor will text you back quickly.  The volunteer Crisis Counselor can help you move from a  hot moment  to a  cool moment.  They can also help you work out a safety plan.

## 2023-12-11 NOTE — PROGRESS NOTES
The below note was dictated using voice recognition. Although reviewed after completion, some word and grammatical error may remain .     Linda is a 78 year old who is being evaluated via a billable telephone visit.      What phone number would you like to be contacted at? [phone]   How would you like to obtain your AVS? Goyo    Distant Location (provider location):  On-site    Assessment & Plan     Infection due to 2019 novel coronavirus  Clinical diagnosis of COVID-19  Acute cough  Myalgia  Sore throat  Reported tested positive for COVID with upper respiratory symptoms, dry cough, headache, sore throat body ache that started yesterday morning and tested positive for COVID yesterday afternoon.  Currently meets criteria for risk of severe illness from COVID and would benefit from medication like Paxlovid.  Reviewed quarantine.  I reviewed supportive care, OTC meds to use if needed, expected course, and signs of concern.  Follow up as needed or if does not improve within 1 week(S) or if worsens in any way.  Reviewed red flag symptoms and is to go to the ER if experiences any of these.    Paxlovid renal adjusted dose given take twice a day for 5 days as directed on package sent to pharmacy of [facility] in [place].  Discussed side effects and risk of rebound COVID.  No longer on Butrans and not requiring Narcan.  No longer on L-tryptophan.  Clarified medications.  Recommend not taking the zolpidem for the 5 days while on Paxlovid.  Decrease trazodone to half of the 100 mg tablet while on the Paxlovid.  Hold the Crestor for the 5 days on Paxlovid.  Monitor blood pressure pulse oxygen while on the medication and ill with COVID.  May continue rest of meds as advised previously.  If having worsening symptoms or questions or concerns please contact her primary care provider.  If breathing gets worse to go to the ER  - nirmatrelvir and ritonavir (PAXLOVID) 150 mg/100 mg therapy pack; Take 2 tablets by mouth 2  times daily for 5 days (Take one tablet of Nirmatelvir and 1 tablet of Ritonavir twice daily for 5 days)    Enlarged pulmonary artery (H)  Shortness of breath  Chronic diastolic (congestive) heart failure (H)  Notes chronic baseline shortness of breath prior to COVID illness not any worse than before.  Monitor for worsening and go to the ER if worse    History of aortic valve replacement - Severe aortic stenosis status post TAVR with 23 mm S3 valve on 8/9/22  Mild ascending aorta dilatation (H24)  Coronary artery disease involving native coronary artery of native heart without angina pectoris  Morbid (severe) obesity due to excess calories (H)  Stage 3a chronic kidney disease (H)  Liver disease, chronic, due to alcohol (H24)  Alcohol use disorder, severe, in early remission (H)  Alcoholic cirrhosis of liver without ascites (H)  Benign essential hypertension  Has several risk factors that increases risk of severe illness from COVID and would benefit from Paxlovid.  Renal adjusted dose given as noted above.    Spinal stenosis, unspecified spinal region  Chronic pain syndrome  Currently pain is manageable and not been on Butrans 2 weeks so it should not interfere with the Paxlovid.    Severe episode of recurrent major depressive disorder, without psychotic features (H)  Chronic insomnia  Depression is stable.  Recommend to not take the zolpidem for 5 days while on Paxlovid and decrease trazodone to half of the 100 mg daily for 5 days is already also on 20 mg of doxepin    Bariatric surgery status  Vitamin B12 deficiency (non anemic)  Continue with rest of the meds and follow-up with primary    Assessment requiring an independent historian(s) - notes in epic briefly reviewed  Diagnosis or treatment significantly limited by social determinants of health - complicated patient, phone visit, new to this provider time constraints unable to review detailed chart  Prescription drug management  50 minutes spent by me on the  date of the encounter doing chart review, history and exam, documentation and further activities per the note     See Patient Instructions    Temitope Rosa MD  [facility]        Sole Mckeon is a 78 year old, presenting for the following health issues:  Covid Concern        12/11/2023     2:38 PM   Additional Questions   Roomed by Tania Shabazz   Accompanied by Self       History of Present Illness       Reason for visit:  Covid  Symptom onset:  1-3 days ago  Symptoms include:  Headache, running nose, tired  Symptom intensity:  Moderate  Symptom progression:  Staying the same  Had these symptoms before:  No  What makes it worse:  Moving around  What makes it better:  Relaxing and laying down, napping    She eats 2-3 servings of fruits and vegetables daily.She consumes 0 sweetened beverage(s) daily.She exercises with enough effort to increase her heart rate 30 to 60 minutes per day.  She exercises with enough effort to increase her heart rate 7 days per week.   She is taking medications regularly.     COVID-19 Symptom Review  How many days ago did these symptoms start? 2 days   Are any of the following symptoms significant for you?  New or worsening difficulty breathing? No  Worsening cough? Yes, it's a dry cough.   Fever or chills? Yes, I felt feverish or had chills.  Headache: YES  Sore throat: YES  Chest pain: No  Diarrhea: No  Body aches? YES  What treatments has patient tried? Acetaminophen and Asprin   Does patient live in a nursing home, group home, or shelter? No  Does patient have a way to get food/medications during quarantined? Yes, I have a friend or family member who can help me.      78-year-old with history of enlarged pulmonary artery chronic shortness of breath at baseline with history of mild pulmonary hypertension, chronic diastolic heart failure, severe aortic stenosis s/p TAVR with 23 mm S3 valve on 8/9/2022, mild ascending aortic dilation, known obstructive coronary  artery disease no history of heart attack, on beta-blocker metoprolol ARB losartan, hyperlipidemia on Crestor, history of morbid obesity, remote gastric bypass, chronic kidney disease stage III, GFR of 55% in November, alcoholic cirrhosis without ascites, alcohol use disorder in early remission, on Campral, hypertension, spinal stenosis chronic pain syndrome under care of chronic pain management currently not on the Butrans patch stopped 2 weeks ago due to expense, has Narcan to use as needed at home, history of severe MDD and anxiety on citalopram, chronic insomnia on zolpidem and trazodone and doxepin as well, bariatric surgery status with B12 deficiency, prior history of acute respiratory failure with hypoxia in 2021 due to influenza A, history of GERD, cold sores on Valtrex as needed, history of psoriasis no longer on Humira several years, history of remote kidney stones, history of thiamine deficiency anemia in the past, under care of PCP Dr. Kitchen at [facility].    Virtual phone appointment made today for history of COVID.  New to this provider    Symptoms started reported yesterday on the 10 th  Home COVID yest afternoon reported was clearly positive  Had COVID before, last year & was not so bad  And influenza in 2021 when had respiratory failure  Times currently headache, dry cough, achiness,   No fever, feels chilled  No chest pain or trouble breathing more than baseline  Sats at home reported 94 %, Bp 128/80, Pusle 77  Lives with someone who is not sick and able to help her.    History and medication reviewed, reported is currently not on Butrans patch 2 weeks, not on Narcan.  Taking tizanidine at most once a day.  Not on Humira couple years, MDD increase citalopram to 20 mg 3 weeks ago and depression is better.  Has had no alcohol in a while and spinal stenosis currently asymptomatic.    Review of Systems   Constitutional, HEENT, cardiovascular, pulmonary, GI, , musculoskeletal,  "neuro, skin, endocrine and psych systems are negative, except as otherwise noted.      Objective    Vitals - Patient Reported  Weight (Patient Reported): 79.4 kg (175 lb)  Height (Patient Reported): 162.6 cm (5' 4\")  BMI (Based on Pt Reported Ht/Wt): 30.04  Pain Score: Moderate Pain (4)  Pain Loc: Head (sore throat)        Physical Exam   healthy, alert, no distress, and cooperative  PSYCH: Alert and oriented times 3; coherent speech, normal   rate and volume, able to articulate logical thoughts, able   to abstract reason, no tangential thoughts, no hallucinations   or delusions  Her affect is normal and pleasant  RESP: No cough, no audible wheezing, able to talk in full sentences  Remainder of exam unable to be completed due to telephone visits    No results found for any visits on 12/11/23.  No results found. However, due to the size of the patient record, not all encounters were searched. Please check Results Review for a complete set of results.        Phone call duration: 20 minutes      "

## 2023-12-11 NOTE — TELEPHONE ENCOUNTER
RN COVID TREATMENT VISIT  12/11/23      The patient has been triaged and does not require a higher level of care.    Kavitha Headley  78 year old  Current weight? 184 lbs    Has the patient been seen by a primary care provider at an [facility] or [facility] within the past two years? Yes.   Have you been in close proximity to/do you have a known exposure to a person with a confirmed case of influenza? No.     General treatment eligibility:  Date of positive COVID test (PCR or at home)?  12/10/23    Are you or have you been hospitalized for this COVID-19 infection? No.   Have you received monoclonal antibodies or antiviral treatment for COVID-19 since this positive test? No.   Do you have any of the following conditions that place you at risk of being very sick from COVID-19?   - Age 50 years or older  - Substance use disorder including alcohol abuse, alcohol dependency, chemical dependency  Yes, patient has at least one high risk condition as noted above.     Current COVID symptoms:   - fever or chills  - muscle or body aches  - headache  - sore throat  - congestion or runny nose  Yes. Patient has at least one symptom as selected.     How many days since symptoms started? 5 days or less. Established patient, 12 years or older weighing at least 88.2 lbs, who has symptoms that started in the past 5 days, has not been hospitalized nor received treatment already, and is at risk for being very sick from COVID-19.     Treatment eligibility by RN:  Are you currently pregnant or nursing? No  Do you have a clinically significant hypersensitivity to nirmatrelvir or ritonavir, or toxic epidermal necrolysis (TEN) or Vines-Nikolai Syndrome? No  Do you have a history of hepatitis, any hepatic impairment on the Problem List (such as Child-Dawn Class C, cirrhosis, fatty liver disease, alcoholic liver disease), or was the last liver lab (hepatic panel, ALT, AST, ALK Phos, bilirubin) elevated in the  past 6 months? YES  Do you have any history of severe renal impairment (eGFR < 30mL/min)? No    Is patient eligible to continue? No, patient does not meet all eligibility requirements for the RN COVID treatment (as denoted by yes response(s) above). Patient informed they will need a virtual provider visit to assess treatment options.  Patient will be transferred to a  at the end of this call.     Tia Colon RN

## 2023-12-11 NOTE — TELEPHONE ENCOUNTER
Pt tested positive for COVID-19 with a home test yesterday. Symptoms started yesterday-     Headache  muscle aches  Fever (99 F)  Chills  sore throat   Nasal Congestion    No chest pain or breathing problems.  Pt called yesterday and advised seen. Pt pushed fluids. Today she feels fatigued has weakness and dizziness. Pt refused ADS. She wants to get Rx for Paxlovid. Routing message to PCP and RN hub for follow up.     Triage advised ED if severe chest pain breathing problems at rest or Oxygen level below 90 %. She was also advised on COVID-19 quarantine.         Reason for Disposition   [1] Drinking very little AND [2] dehydration suspected (e.g., no urine > 12 hours, very dry mouth, very lightheaded)   SEVERE-RISK patient (e.g., immuno-compromised, serious lung disease, on oxygen, heart disease, bedridden, etc) AND suspected COVID-19 with mild symptoms    Additional Information   Negative: SEVERE difficulty breathing (e.g., struggling for each breath, speaks in single words)   Negative: Shock suspected (e.g., cold/pale/clammy skin, too weak to stand, low BP, rapid pulse)   Negative: Difficult to awaken or acting confused (e.g., disoriented, slurred speech)   Negative: [1] Fainted > 15 minutes ago AND [2] still feels too weak or dizzy to stand   Negative: [1] SEVERE weakness (i.e., unable to walk or barely able to walk, requires support) AND [2] new-onset or worsening   Negative: Sounds like a life-threatening emergency to the triager   Negative: Weakness of the face, arm or leg on one side of the body   Negative: [1] Diabetes mellitus AND [2] weakness from low blood sugar (i.e., < 60 mg/dl or 3.5 mmol/l)   Negative: Heat exhaustion suspected (i.e., dehydration from heat exposure)   Negative: Chest pain   Negative: Vomiting is main symptom   Negative: Diarrhea is main symptom   Negative: Difficulty breathing   Negative: Heart beating < 50 beats per minute OR > 140 beats per minute   Negative: Extra heartbeats,  "irregular heart beating, or heart is beating very fast  (i.e., \"palpitations\")   Negative: Follows large amount of bleeding (e.g., from vomiting, rectum, vagina  (Exception: Small brief weakness from sight of a small amount blood.)   Negative: Black or tarry bowel movements   Negative: Severe difficulty breathing (struggling for each breath, unable to speak or cry, making grunting noises with each breath, severe retractions) (Triage tip: Listen to the child's breathing.)   Negative: Slow, shallow, weak breathing   Negative: Bluish (or gray) lips or face now   Negative: Difficult to awaken or not alert when awake   Negative: Very weak (doesn't move or make eye contact)   Negative: Sounds like a life-threatening emergency to the triager   Negative: Low rates of COVID-19 regionally (Exception: known COVID-19 close contact)   Negative: Previous diagnosis of asthma (or RAD) or regular use of asthma medicines for wheezing and asthma symptoms   Negative: Croup suspected (barky cough with hoarseness) OR any stridor within the last 24 hours   Negative: Runny nose from nasal allergies   Negative: [1] Headache is isolated symptom (no fever) AND [2] no known COVID-19 close contact   Negative: [1] Vomiting is isolated symptom (no fever) AND [2] no known COVID-19 close contact   Negative: [1] Diarrhea is isolated symptom (no fever) AND [2] no known COVID-19 close contact   Negative: [1] COVID-19 exposure AND [2] NO symptoms   Negative: [1] COVID-19 vaccine general reaction (fever, headache, muscle aches, fatigue) AND [2] starts within 48 hours of shot (Note: vaccine does not cause respiratory symptoms. Stay here for those symptoms.)   Negative: COVID-19 vaccines, questions about   Negative: [1] Diagnosed with influenza within the last 2 weeks by a HCP AND [2] follow-up call   Negative: [1] Household exposure to known influenza (flu test positive) AND [2] child with influenza-like symptoms   Negative: Difficulty breathing " confirmed by triager BUT not severe (includes tight breathing and hard breathing)   Negative: Retractions - skin between the ribs is pulling in (sinking in) with each breath   Negative: Age < 12 weeks with fever 100.4 F (38.0 C) or higher rectally   Negative: Oxygen level <92% (<90% if altitude > 5000 feet) and any trouble breathing   Negative: SEVERE chest pain (excruciating)   Negative: Muscle or body pains AND complication suspected (can't stand, can't walk, can barely walk, can't move arm or hand normally or other serious symptom)   Negative: Headache AND complication suspected (stiff neck, incapacitated by pain, worst headache ever, confused, weakness or other serious symptom)   Negative: Rapid breathing (Breaths/min > 60 if < 2 mo; > 50 if 2-12 mo; > 40 if 1-5 years; > 30 if 6-11 years; > 20 if > 12 years)   Negative: MODERATE chest pain that keeps from taking a deep breath   Negative: Lips or face have turned bluish BUT only during coughing fits   Negative: Sore throat AND complication suspected (refuses to drink, can't swallow fluids, new-onset drooling, can't move neck normally or other serious symptom)   Negative: Multisystem Inflammatory Syndrome (MIS-C) suspected by triager (Fever AND 2 or more of the following: widespread red rash, red eyes, red lips, red palms/soles, swollen hands/feet, abdominal pain, vomiting, diarrhea)   Negative: Child sounds very sick or weak to the triager   Negative: Wheezing confirmed by triager BUT no trouble breathing   Negative: Fever > 105 F (40.6 C)   Negative: Shaking chills (severe shivering) present > 30 minutes   Negative: Dehydration suspected (signs: no urine > 8 hours AND very dry mouth, no tears, ill-appearing, etc.)   Negative: Age < 3 months with lots of coughing   Negative: Crying that cannot be comforted lasts > 2 hours    Protocols used: Weakness (Generalized) and Fatigue-A-AH, Coronavirus (COVID-19) Diagnosed or [id number]

## 2023-12-19 NOTE — PROGRESS NOTES
HPI: Kavitha Headley is an 78 year old female with a h/o paroxysmal SVT and PACs, hypertension, hyperlipidemia, obesity s/p gastric bypass surgery, chronic diastolic heart failure, substance use disorder, and severe aortic stenosis s/p TAVR with 23 mm Payne madyson 3 valve (8/9/2022), who presents today for evaluation of her known and stable 41 mm ATAA, which has not grown since 2019. She has no known AAA, or iliac aneurysms. Her BP on 11/21/2023 was 153/88 on amlodipine 5 mg daily. She was not at that time on a beta blocker or on an ACE/ARB due to unclear reasons. She was switched to metoprolol and losartan on 11/21/2023. Her LDL was 133 on no lipid lowering meds. She was placed on Crestor 40 mg daily on 11/21/2023     Review Of Systems  Skin: negative  Eyes: negative  Ears/Nose/Throat: negative  Respiratory: positive for lifestyle limiting shortness of breath with exertion; no weight gain or cough, or hemoptysis  Cardiovascular: negative  Gastrointestinal: negative  Genitourinary: negative  Musculoskeletal: ankle swelling on amlodipine 10 mg daily  Neurologic: negative  Psychiatric: negative  Hematologic/Lymphatic/Immunologic: negative  Endocrine: negative        PAST MEDICAL HISTORY:                  Past Medical History        Past Medical History:   Diagnosis Date    Alcohol abuse      Anxiety disorder      Ascending aorta dilatation (H24)      Bariatric surgery status 1996?     gastric bypass, [facility] and    Benign hypertension      Chronic insomnia      Chronic pain syndrome       Chronic back and neck pain, chronic pain due to osteoarthritis multiple joints    Coronary artery disease involving native coronary artery of native heart without angina pectoris 10/16/2018     Minimal coronary artery disease on coronary angiogram in 2015.     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    Major depressive disorder, single episode, severe, without mention of psychotic behavior      Mixed hyperlipidemia      Pelvic relaxation disorder       Surgical intervention for cystocele/rectocele 3,11/2012    Personal history of urinary calculi 06/2006     left ureteral stone,lithotripsy    Psoriasis      Spinal stenosis      Stage III chronic kidney disease (H) 2005            PAST SURGICAL HISTORY:                  Past Surgical History         Past Surgical History:   Procedure Laterality Date    APPENDECTOMY   3/2004     incidental    ARTHRODESIS TOE(S) Right 1/31/2020     Procedure: RIGHT FIRST METATARSAL PHALANGEAL JOINT ARTHRODESIS;  Surgeon: Steven Reyes MD;  Location:  OR    C MEDIASTINOSCOPY W OR WO BIOPSY   2/2008     Videomediastinoscopy and, for mediastinal adenopathy -reactive lymphoid hyperplasia    CARPAL TUNNEL RELEASE RT/LT   10/2010     Carpometacarpal excisional arthroplasty with a fascial autograft and APL suspension sling (42224). 2. Left thumb metacarpophalangeal joint fusion with autologous bone graft (06191). 3. Left endoscopic carpal tunnel release     CHOLECYSTECTOMY, LAPOROSCOPIC   11/2010     Cholecystectomy, Laparoscopic    COLONOSCOPY N/A 9/8/2016     Procedure: COMBINED COLONOSCOPY, SINGLE OR MULTIPLE BIOPSY/POLYPECTOMY BY BIOPSY;  Surgeon: Moe Barlow MD;  Location:  GI    CV CORONARY ANGIOGRAM N/A 6/20/2022     Procedure: Coronary Angiogram;  Surgeon: Nora Parker MD;  Location: [facility] CARDIAC CATH LAB    CV PCI N/A 6/20/2022     Procedure: Percutaneous Coronary Intervention;  Surgeon: Nora Parker MD;  Location:  HEART CARDIAC CATH LAB    CV RIGHT HEART CATH MEASUREMENTS RECORDED N/A 6/20/2022     Procedure: Right Heart Catheterization;  Surgeon: Nora Parker MD;  Location:  HEART CARDIAC CATH LAB    CV  TRANSCATHETER AORTIC VALVE REPLACEMENT-FEMORAL APPROACH N/A 8/9/2022     Procedure: Transcatheter Aortic Valve Replacement-Femoral Approach;  Surgeon: Nora Parker MD;  Location:  HEART CARDIAC CATH LAB    CYSTOCELE REPAIR   11/2012     davinci laparoscopic sacrocolpopexy, enterocele repair, lysis of adhesions, placement of retropubic mid urethral sling, cystoscopy    CYSTOSCOPY, LITHOTRIPSY, COMBINED   6/2006     Left extracorporeal shock wave lithotripsy, cystoscopy, left ureteral stent placement.    CYSTOSCOPY, REMOVE STENT(S), COMBINED   7/2006     Cystoscopy, removal of left ureteral stent, retrograde pyelography, flexible and rigid ureteroscopy and holmium laser lithotripsy, basket removal of stone fragments, ureteral stent placement.     ENDOSCOPIC ULTRASOUND UPPER GASTROINTESTINAL TRACT (GI) N/A 6/12/2017     Procedure: ENDOSCOPIC ULTRASOUND, ESOPHAGOSCOPY / UPPER GASTROINTESTINAL TRACT (GI);  ENDOSCOPIC ULTRASOUND, ESOPHAGOSCOPY / UPPER GASTROINTESTINAL TRACT (GI);  Surgeon: Parth Graham MD;  Location:  GI    HERNIA REPAIR   4/2012     bilateral augmentation mastopexy, ventral hernia repair, and medial thigh liposuction on 04/06/2012.     HYSTERECTOMY VAGINAL, BILATERAL SALPINGO-OOPHERECTOMY, COMBINED   1998     due to myoma and bleeding    JOINT REPLACEMENT, HIP RT/LT   4/2004     right total hip arthroplasty    LAPAROTOMY, LYSIS ADHESIONS, COMBINED   3/2004     lysis adhesions, ventral hernia repair, appendectomy incidentally    LYMPH NODE BIOPSY   4/2008     right axillary, reactive follicular and paracortical hyperplasia.    MAMMOPLASTY AUGMENTATION BILATERAL   4/2012    REPAIR HAMMER TOE Right 1/31/2020     Procedure: WITH SECOND AND THIRD CLAW TOE RECONSTRUCTION;  Surgeon: Steven Reyes MD;  Location:  OR    REVISE RECONSTRUCTED BREAST   6/7/2012     Left breast capsulotomy.     ZZC GASTRIC BYPASS,OBESE<100CM ARIANNA-EN-Y   1996    [facility] REPAIR OF RECTOCELE   3/2012    [facility] TOTAL  KNEE ARTHROPLASTY   12/2005     left             CURRENT MEDICATIONS:                  Current Outpatient Prescriptions          Current Outpatient Medications   Medication Sig Dispense Refill    acamprosate (CAMPRAL) 333 MG EC tablet Take 2 tablets (666 mg) by mouth 3 times daily Start with 1 tablet three times daily, increase to 2 tablets after 2 weeks if not finding improvement 90 tablet 1    acetaminophen (TYLENOL) 500 MG tablet Take 1,000 mg by mouth 2 times daily as needed for pain        amLODIPine (NORVASC) 10 MG tablet Take 1 tablet (10 mg) by mouth daily 90 tablet 3    aspirin (ASA) 81 MG EC tablet Take 81 mg by mouth daily        BETA BLOCKER NOT PRESCRIBED (INTENTIONAL) Please choose reason not prescribed from choices below.        bisacodyl (DULCOLAX) 5 MG EC tablet Take 5 mg by mouth daily as needed for constipation        buprenorphine (BUTRANS) 5 MCG/HR WK patch Place 1 patch onto the skin once a week        [START ON 11/22/2023] citalopram (CELEXA) 20 MG tablet Take 1 tablet (20 mg) by mouth daily 30 tablet 1    doxepin (SINEQUAN) 10 MG capsule Take 2 capsules (20 mg) by mouth at bedtime 60 capsule 0    folic acid (FOLVITE) 1 MG tablet Take 1 mg by mouth daily        gabapentin (NEURONTIN) 600 MG tablet Take 1 tablet (600 mg) by mouth at bedtime 30 tablet 0    MISC NATURAL PRODUCTS PO Take 1 tablet by mouth daily *L-Tryptophan 1000 mg*        Multiple Vitamins-Minerals (CENTRUM SILVER) per tablet Take 1 tablet by mouth daily        polyethylene glycol (MIRALAX) 17 GM/Dose powder Take 17 g by mouth 2 times daily as needed for constipation        tiZANidine (ZANAFLEX) 4 MG capsule Take 4 mg by mouth 3 times daily as needed for muscle spasms        traZODone (DESYREL) 100 MG tablet TAKE 1 TABLET BY MOUTH EVERYDAY AT BEDTIME 30 tablet 1    valACYclovir (VALTREX) 1000 mg tablet TAKE 2 TABLETS (2,000 MG) BY MOUTH AS NEEDED (ONSET OF COLD SORE) 4 tablet 0    zolpidem ER (AMBIEN CR) 6.25 MG CR tablet Take  6.25 mg by mouth At Bedtime                ALLERGIES:                        Allergies   Allergen Reactions    Bactrim [Sulfamethoxazole-Trimethoprim] Hives    Morphine Itching       NAUSEA    Morphine And Related Itching       NAUSEA         SOCIAL HISTORY:                  Social History   Social History            Socioeconomic History    Marital status:        Spouse name: Mt    Number of children: 4    Years of education: 18    Highest education level: Not on file   Occupational History    Occupation: nurse       Employer: [employer]       Employer: RETIRED   Tobacco Use    Smoking status: Never    Smokeless tobacco: Never   Substance and Sexual Activity    Alcohol use: Not Currently       Alcohol/week: 63.0 standard drinks of alcohol       Types: 63 Standard drinks or equivalent per week    Drug use: No    Sexual activity: Yes       Partners: Male   Other Topics Concern     Service Not Asked    Blood Transfusions No    Caffeine Concern Yes       Comment: 1-2 cups per day     Occupational Exposure Yes       Comment: blood    Hobby Hazards No    Sleep Concern Yes    Stress Concern Yes    Weight Concern Yes       Comment: gastric  byepass    Special Diet No    Back Care No    Exercise Yes       Comment: walk, swin    Bike Helmet No    Seat Belt Yes    Self-Exams Yes    Parent/sibling w/ CABG, MI or angioplasty before 65F 55M? Not Asked   Social History Narrative    Not on file      Social Determinants of Health           Financial Resource Strain: Low Risk  (10/25/2023)     Financial Resource Strain      Within the past 12 months, have you or your family members you live with been unable to get utilities (heat, electricity) when it was really needed?: No   Food Insecurity: Low Risk  (10/25/2023)     Food Insecurity      Within the past 12 months, did you worry that your food would run out before you got money to buy more?: No      Within the past 12 months, did the food you bought just not last and  you didn t have money to get more?: No   Transportation Needs: Low Risk  (10/25/2023)     Transportation Needs      Within the past 12 months, has lack of transportation kept you from medical appointments, getting your medicines, non-medical meetings or appointments, work, or from getting things that you need?: No   Physical Activity: Not on file   Stress: Not on file   Social Connections: Not on file   Interpersonal Safety: Not on file   Housing Stability: Low Risk  (10/25/2023)     Housing Stability      Do you have housing? : Yes      Are you worried about losing your housing?: No            FAMILY HISTORY:                   Family History         Family History   Problem Relation Age of Onset    Substance Abuse Father      Cancer Father           throat and lung mets    Diabetes No family hx of      Coronary Artery Disease No family hx of      Cerebrovascular Disease No family hx of                 Physical exam Reveals:     O/P: WNL  HEENT: WNL  NECK: No JVD, thyromegaly, or lymphadenopathy; positive left carotid bruit  HEART: RRR, no murmurs, gallops, or rubs  LUNGS: CTA bilaterally without rales, wheezes, or rhonchi  GI: NABS, nondistended, nontender, soft  EXT:without cyanosis, clubbing, or edema  NEURO: nonfocal  : no flank tenderness        Rt femoral:      3 plus palpable    Rt popliteal:     4 plus palpable         Lt femoral:       3 plus palpable   Lt popliteal:      4 plus palpable                  EXAM: CT CHEST PULMONARY EMBOLISM W CONTRAST  LOCATION: [facility]  DATE: 11/6/2023     INDICATION: Short of breath, worsening, elevated D-dimer  COMPARISON: CT 12/17/2021  TECHNIQUE: CT chest pulmonary angiogram during arterial phase injection of IV contrast. Multiplanar reformats and MIP reconstructions were performed. Dose reduction techniques were used.   CONTRAST: 68 mL Isovue 370     FINDINGS:  ANGIOGRAM CHEST: The main pulmonary artery is enlarged measuring up to 3.9 cm in  greatest radial dimension which can be associated with changes of pulmonary arterial hypertension. There is no evidence for pulmonary emboli. Thoracic aorta is negative for   dissection, but there is 4.1 cm ascending aortic aneurysmal dilatation seen. There is a TAVER.  Mild reflux of contrast into the IVC which can be associated with mild increased right heart pressure/strain.     LUNGS AND PLEURA: There is a benign calcified granuloma in the right upper lobe.     MEDIASTINUM/AXILLAE: Benign calcified right hilar lymph nodes which should be associated with the benign calcified granuloma in the right upper lobe.     CORONARY ARTERY CALCIFICATION: Mild.     UPPER ABDOMEN: Prior postoperative changes of the GI tract with no complications identified. Mild atrophy of the left kidney.     MUSCULOSKELETAL: Bilateral breast implants with no evidence for rupture. Moderate to advanced multilevel degenerative changes, most marked in the lower thoracic spine.                                                                      IMPRESSION:  1.  No PE.     2.  4.1 cm ascending thoracic aortic aneurysm with no associated dissection.     3.  Enlargement of the central main pulmonary artery likely sequelae of pulmonary arterial hypertension.     4.  Since 12/17/2021, the TAVER is new, otherwise there have been no significant changes.           Procedure: CT ANGIOGRAM TAVR   Examination Date: 6/28/2022 4:41 PM   Indication: Severe aortic stenosis, Pre TAVR  Ordering Provider: CRISTI MOTT     TECHNIQUE: ECG gated CT of the heart, aorta and nongated CT of the  chest abdomen pelvis without and with IV contrast Isovue 115 mL was  performed per the HIEU protocol on a Siemens Dual Source Flash scanner  without incident. A noncontrast CT of the chest abdomen and pelvis was  performed followed by contrast-enhanced CTA of the heart in a spiral  gated mode with limited field-of-view followed by gated high pitch  spiral CTA of the chest,  abdomen, pelvis at 100/120 kVP to evaluate  the thoracoabdominal aorta and iliac vasculature. Scan protocol was  optimized to minimize radiation exposure. The total radiation exposure  was 960 DLP and 13.44 mSv. Images were reconstructed and analyzed on a  [employer] Workstation.                                                                       IMPRESSION:  1.  No acute aortic pathology like dissection, intramural hematoma or  contained rupture noted.  2.  Please review detailed radiology report, to follow separately, for  incidental non-cardiovascular findings.     FINDINGS:     1.  Cuspid aortic valve. Aortic valve calcium score is 736. The  ascending aorta is mild calcified, aortic arch is  mild calcified, the  descending thoracic aorta is mild calcified.   2.  The arch vessel branching pattern is normal.   3.  The suprarenal abdominal aorta is mild calcified; infrarenal  abdominal aorta is mild calcified  4.  Widely patent origins of celiac trunk, superior mesenteric artery  and inferior mesenteric artery.  Single bilateral renal arteries with  widely patent origins.   5.  There is no acute aortic pathology, such as dissection, intramural  hematoma, or contained rupture.      MEASUREMENTS:   Representative dimensions of the thoracoabdominal aorta are as  follows:     1. AORTIC ANNULUS MEASUREMENTS:     *  The average aortic annulus diameter is 21.8 mm,   *  Aortic annulus area is 3.75 cm2  *  Aortic annulus perimeter is 72.2 mm  *  The 3 cusp coaxial angle for aortic valve is (Occitan 23 , CRA 6) these   angles will vary depending upon the patient's body position  *  Left main - Annulus distance: 10.8 mm, RCA - Annulus distance: 15.1  mm     2. LVOT MEASUREMENTS:     *  The average LVOT diameter is 20.8 mm  *  LVOT area is 3.42 cm2  *  LVOT perimeter is 68.7 mm  *  LVOT calcification none mm     3. AORTA MEASUREMENTS     1.  The aortic root at the sinuses of Valsalva (L/R/N) 31.4 mm x  27.6  mm 29.4 mm  2.  The  sinotubular junction:  27 mm x 26.3 mm  3.  Sinotubular junction height: 18.4 mm x 18.9 mm  4.  Proximal ascending aorta: 42.2 mm x 39.9 mm  5.  Distal abdominal aorta proximal to the bifurcation: 15.3 mm x 14.2  mm     4. ILIOFEMORAL MEASUREMENTS:     RIGHT:  1.  Right common iliac artery: 9.09 mm x 9.96 mm   2.  Right external iliac artery: 6.14 mm x 7.65 mm   3.  Right common femoral artery: 5.36 mm x 7.83 mm   4.  Tortuosity: mild   5.  Calcification: mild      LEFT:  1.  Left common iliac artery: 7.32 mm x  9.06 mm  2.  Left external iliac artery: 6.17 mm x 7.45 mm  3.  Left common femoral artery: 5.71 mm x 11.48 mm  4.  Tortuosity: mild   5.  Calcification: mild      7. Aortic Root Angle 47.3 degrees     8. Innominate Artery :  14.2 mm X 13mm     9. Left Carotid Artery:  8.12mm X 7.4 mm     OTHER FINDINGS:   1.  Large epicardial fat-pad cannot rule out effusion.  2.  Please review radiology review for incidental non-cardiovascular  findings including lungs, abdominal organs, bone, soft tissue, nodes  to follow separately     JOSSELINE DE LOS SANTOS MD            CTA CHEST WITH CONTRAST   7/12/2019 1:00 PM      HISTORY: Aortic disease, nontraumatic, known or suspected; aortic  valve disease; other nonrheumatic aortic valve disorders.     TECHNIQUE:  CT of the chest was performed following the administration  of 90 mL Isovue-370. Radiation dose for this scan was reduced using  automated exposure control, adjustment of the mA and/or kV according  to patient size, or iterative reconstruction technique.     COMPARISON: CT chest dated 9/10/2010.     FINDINGS:    Vascular examination:  The thoracic aorta at the level of the sinuses  of Valsalva has a maximum diameter of approximately 3.4 cm. The  maximum diameter of the ascending thoracic aorta is approximately 4.1  cm. The diameter at the level of the mid arch is approximately 2.3 cm.  The diameters of the proximal, mid, and distal descending thoracic  aorta are 2.3, 1.9,  and 1.9 cm respectively.     The thoracic aortic takeoff vessels are patent without significant  stenoses.     The celiac trunk, superior mesenteric artery, and renal arteries are  patent. Calcified plaque obscures the origin of the left renal artery.     Soft tissues: There are multiple small mediastinal lymph nodes. There  is a calcified granuloma in the right upper lobe. There is atelectasis  in the left lower lobe just posterior to the left major fissure. There  are postsurgical changes involving the stomach. Patient is status post  a cholecystectomy.                                                                      IMPRESSION: Mild dilatation of the ascending thoracic aorta which has  a maximum diameter of approximately 4.1 cm.     CONOR VANG MD        Component      Latest Ref Rng 9/25/2023  2:42 PM   Cholesterol      <200 mg/dL 259 (H)    Triglycerides      <150 mg/dL 140    HDL Cholesterol      >=50 mg/dL 98    LDL Cholesterol Calculated      <=100 mg/dL 133 (H)    Non HDL Cholesterol      <130 mg/dL 161 (H)       Legend:  (H) High      US CAROTID BILATERAL   12/6/2023 2:54 PM     CLINICAL HISTORY: left carotid bruit. Please comment upon LICA  stenosis if present.; Left carotid bruit  TECHNIQUE: Duplex exam performed utilizing 2D gray-scale imaging,  Doppler interrogation with color-flow and spectral waveform analysis.     COMPARISON: None.     FINDINGS:  RIGHT: There is mild atheromatous plaque. Normal waveforms with no  significant stenosis.     LEFT: There is mild atheromatous plaque. Tortuous left internal  carotid artery with normal waveforms and no significant stenosis.     Both vertebral arteries and subclavian artery waveforms are normal.     VELOCITY CHART:  The following velocities were obtained in the RIGHT carotid system.  Prox CCA:          79/23 cm/s  Mid/distal CCA: 81/24 cm/s  Prox ICA:            68/19 cm/s  Mid ICA:              80/28 cm/s  Distal ICA:          83/35 cm/s  ECA:                    81/7 cm/s  Vertebral:            42/10 cm/s     ICA/CCA: PS 0.98     The following velocities were obtained in the LEFT carotid system.     Prox CCA:          95/20 cm/s  Mid/distal CCA: 84/25 cm/s  Prox ICA:            73/24 cm/s  Mid ICA:              100/35 cm/s  Distal ICA:          111/41 cm/s  ECA:                   29/80 cm/s  Vertebral:            75/29 cm/s     ICA/CCA: PS 1.3                                                                         IMPRESSION:  1. Mild atheromatous plaque in the carotid arteries.  2. No significant carotid stenosis identified.     Evaluation based on velocities and NASCET criteria.     RODRIGO OBREGON MD       CTA ABDOMEN AND PELVIS BILATERAL LEG RUNOFF WITH CONTRAST   12/6/2023  2:44 PM      HISTORY: Known ATAA, evaluate for AAA, mesenteric aneurysms, LE iliac,  femoral, popliteal aneurysms, prominent popliteal pulses bilaterally;  Aneurysm of ascending aorta without rupture (H24).     TECHNIQUE: CTA of the abdomen and pelvis with runoff to the toes with  100 mL Isovue-370 IV. Radiation dose for this scan was reduced using  automated exposure control, adjustment of the mA and/or kV according  to patient size, or iterative reconstruction technique. 3-D images  were created at an independent workstation under concurrent  supervision at the request of the ordering provider.     COMPARISON: CTA abdomen and pelvis 6/28/2022.     FINDINGS:   LUNG BASES: Lung bases are clear. No pleural effusion or pericardial  effusion. Changes of bilateral breast augmentation. Changes of TAVR.     VASCULATURE: Atherosclerotic disease is noted throughout the abdominal  aorta without evidence of aneurysm, dissection or stenosis. The celiac  access, superior mesenteric artery, and inferior mesenteric arteries  are all patent. Bilateral renal arteries are patent.     The bilateral common iliac, internal iliac, and external iliac  arteries are patent.     RIGHT LOWER EXTREMITY:  Right common femoral, profunda femoral,  superficial femoral and visualized portions of the popliteal artery  are patent. Evaluation of the popliteal artery is significantly  limited secondary to beam hardening artifact from knee arthroplasty.  Three-vessel runoff.     LEFT LOWER EXTREMITY: Left common femoral, profunda femoral,  superficial femoral and visualized portion of the popliteal artery are  patent. Three-vessel runoff. Evaluation of the popliteal artery is  significantly limited secondary to beam hardening artifact from knee  arthroplasty.     ABDOMEN: Evaluation of solid organ parenchyma is limited secondary to  contrast bolus timing. The arterial enhanced appearance of the spleen,  adrenal glands, kidneys, liver, and pancreas show no focal  abnormality. Changes of cholecystectomy. Changes of gastric bypass. No  intrahepatic or extrahepatic biliary dilatation. No intraperitoneal  free air or free fluid.     PELVIS: No dilated loops of small or large bowel. Diverticulosis  without evidence of diverticulitis. Prominent groin lymph nodes with  normal fatty leilani are noted. No abdominal or pelvic lymphadenopathy.  Evaluation of the pelvis is somewhat limited secondary to beam  hardening artifact from right hip arthroplasty. Limited evaluation of  the bladder shows no focal abnormality.     MUSCULOSKELETAL: Changes of right hip arthroplasty and bilateral knee  arthroplasty. No suspicious bony lesions.                                                                      IMPRESSION:  1. Patent abdominal aorta and visceral vessels.  2. Patent pelvic inflow vessels.  3. Patent arteries throughout the bilateral lower extremities without  evidence of stenosis or occlusion. Evaluation of the popliteal  arteries are somewhat limited secondary to beam hardening artifact  from bilateral knee arthroplasty.  4. Changes of cholecystectomy.  5. Changes of gastric bypass.  6. Diverticulosis without evidence of  diverticulitis.     FELISHA MEHTA, DO                     A/P:        (I71.21) Aneurysm of ascending aorta without rupture (H24)  (primary encounter diagnosis)  Comment: This is 41 mm in size and is not growing. Her SBP is elevated > 120/70  on amlodipine 10 mg daily. Her LDL-C is 131 on no meds. She does not currently require surgical intervention.   Plan: Annual surveillance of ATAA with CTA chest in 12/2024. Order at next visit. Consider TTE thereafter if no growth. Order at subsequent visits. Obtian better BP control and lipid control (see below) .     (R09.89) Abnormal peripheral pulse  Comment: increased popliteal pulses. CTA abd/pelvis with runoff revealed no PA aneurysms but evaluation of the popliteal  arteries was limited secondary to beam hardening artifact from bilateral knee arthroplasties.  Plan: Check popliteal duplex 02/2024 RTC 2 weeks later.          (I10) Essential hypertension  Comment: Was not at goal on 11/2/12023 and was having ankle swelling on amlodipine so we stopped that and started metoprolol 50 mg PO BID and losartan 50 mg daily. BP is 130/83, HR is 70.   Plan:Continue losartan (COZAAR) 50 MG daily without change, increase metoprolol         tartrate (LOPRESSOR) from 25 to 50 MG BID.        RTC 2/2024 for BP check      (E78.5) Hyperlipidemia LDL goal <70  Comment: not at goal so we started rosuvastatin (CRESTOR) 20 MG tablet daily.  Plan: Check advanced lipid testing around 2/21/24. RTC two weeks later     (R09.89) Left carotid bruit  Comment: Likely referred sound form prosthetic murmur. Carotid duplex revealed no significant ICA ds bilaterally.   Plan: US Carotid Bilateral             (G47.33) JENN (obstructive sleep apnea)  Comment: She has concerns regarding pulmonary htn since her PA size was increased on 11/06 CT PE protocol chest. She has been dxd with JENN and is not yet using a mouthguard or CPAP mask  Plan: Treat JENN. RTC 02/2024 to discuss.     (I27.20) Pulmonary  hypertension (H)  Comment: As above  Plan: Treat JENN. See GRULLON below. That w/u is unrevealing. If wearing CPAP/mouthguard for three months does not improve sxs, refer to pulmonary htn clinic.     (R06.09) Dyspnea on exertion  Comment: Lifestyle limiting. Normal lung exam. Normal EF on TTE. Checked a six minute walk test on 12/05/2023. Results indicate She did not desaturate. She could only complete 72% of the test.. I suspect she is merely deconditioned.   Plan: F/U on GRULLON with PCP if .                         35 minutes total medical care on today's date.

## 2023-12-19 NOTE — PROGRESS NOTES
Clinical Product Navigator RN reviewed chart; patient on payer product coverage.  Review results:   CPN Initial Information Gathering  Referral Source: Health Plan  Referrals Places: Care Coordination    Patient identified by health plan for care management.  Risk of hospital admission or ED Visit is 81.1%-High.  Patient is potentially eligible for health plan's SSBCI benefit for diagnosis of CHF.  Patient has open care gaps including an annual wellness visit.    Care Coordination program initiated.    Melissa Behl BSN, RN, PHN, CCM  RN Clinical Product Navigator  Ph: [phone]

## 2023-12-19 NOTE — PROGRESS NOTES
"Patient is here to discuss follow up    /83 (BP Location: Right arm, Patient Position: Chair, Cuff Size: Adult Regular)   Pulse 70   Ht 5' 2\" (1.575 m)   Wt 175 lb 9.6 oz (79.7 kg)   LMP  (LMP Unknown)   SpO2 98%   BMI 32.12 kg/m      Questions patient would like addressed today are: N/A.    Refills are needed: No    Has homecare services and agency name:  [facility]    "

## 2023-12-20 NOTE — LETTER
[facility] CARE COORDINATION  [address] [place] [address]    December 21, 2023    Kavitha Headley  [address] [facility]  [place] [address]      Dear Kavitha,    I am a clinic community health worker who works with Ghazal Rubalcava MD with the [facility]. I wanted to thank you for spending the time to talk with me.  Below is a description of clinic care coordination and how I can further assist you.       The clinic care coordination team is made up of a registered nurse, , financial resource worker and community health worker who understand the health care system. The goal of clinic care coordination is to help you manage your health and improve access to the health care system. Our team works alongside your provider to assist you in determining your health and social needs. We can help you obtain health care and community resources, providing you with necessary information and education. We can work with you through any barriers and develop a care plan that helps coordinate and strengthen the communication between you and your care team.  Our services are voluntary and are offered without charge to you personally.    Please feel free to contact me with any questions or concerns regarding care coordination and what we can offer.      We are focused on providing you with the highest-quality healthcare experience possible.    Sincerely,     ADILSON Jones  Clinic Care Coordination  [facility]: [place], [place], [place], and [facility]  Phone: [phone]

## 2023-12-20 NOTE — TELEPHONE ENCOUNTER
Future Appointments   Date Time Provider Department Center   1/24/2024  2:30 PM Ghazal Rubalcava MD [facility]    2/12/2024 11:30 AM  PULMONARY FUNCTION [facility]   2/12/2024  1:00 PM Yoon Hernandez MD [facility]   2/28/2024 11:40 AM Juan Antonio Montelongo MD [facility]   2/29/2024  9:30 AM Leventhal, Thomas Michael, MD [facility]           Please call patient and schedule:    Fasting labs two weeks prior to 2/28 visit with Dr. Montelongo  BLE arterial ultrasound - may be right before 2/28 visit or may be done with the labs.

## 2023-12-20 NOTE — PROGRESS NOTES
Clinic Care Coordination Contact  [facility]/Voicemail    Clinical Data: Care Coordinator Outreach    Outreach Documentation Number of Outreach Attempt   12/20/2023  10:00 AM 1         Reason for Referral:    Risk of hospital admission or ED Visit is 81.1%-High.  Patient is potentially eligible for health plan's SSBCI benefit for diagnosis of CHF.  Patient has open care gaps including an annual wellness visit.  Patient has a future PCP visit 1/24/24; would they be willing to see pt for AWV at that appointment?        Left message on patient's voicemail with call back information and requested return call.    Plan: Care Coordinator will try to reach patient again in 1-2 business days.    ADILSON Jones  Clinic Care Coordination  [facility]: [place], and [facility]  Phone: [phone]

## 2023-12-21 NOTE — PROGRESS NOTES
Clinic Care Coordination Contact  Community Health Worker Initial Outreach    CHW introduced self, intent of call, and offered the CC program.    CHW Initial Information Gathering:  Referral Source: Other, specify (CPN)     Reason for Referral:     Risk of hospital admission or ED Visit is 81.1%-High.  Patient is potentially eligible for health plan's SSBCI benefit for diagnosis of CHF.  Patient has open care gaps including an annual wellness visit.  Patient has a future PCP visit 1/24/24; would they be willing to see pt for AWV at that appointment?        Patient accepts CC: No, not interested at this time. Patient will be sent Care Coordination introduction letter for future reference.    Plan: Care Coordinator will send care coordination introduction letter with care coordinator contact information and explanation of care coordination services via [employer].     Care Coordinator will do no further outreaches at this time.      ADILSON Jones  Clinic Care Coordination  [facility]: [place], [place], [place], and [facility]  Phone: [phone]

## 2023-12-21 NOTE — TELEPHONE ENCOUNTER
Left voicemail with instructions for patient to call back to schedule their appointment(s)    December 21, 2023 , 2:23 PM

## 2024-01-01 ENCOUNTER — HOSPITAL ENCOUNTER (OUTPATIENT)
Dept: CARDIOLOGY | Facility: CLINIC | Age: 79
Discharge: HOME | End: 2024-04-02
Attending: EMERGENCY MEDICINE
Payer: COMMERCIAL

## 2024-01-01 ENCOUNTER — LAB (OUTPATIENT)
Dept: LAB | Facility: CLINIC | Age: 79
End: 2024-01-01
Payer: COMMERCIAL

## 2024-01-01 ENCOUNTER — PATIENT OUTREACH (OUTPATIENT)
Dept: CARE COORDINATION | Facility: CLINIC | Age: 79
End: 2024-01-01
Payer: COMMERCIAL

## 2024-01-01 ENCOUNTER — TELEPHONE (OUTPATIENT)
Dept: FAMILY MEDICINE | Facility: CLINIC | Age: 79
End: 2024-01-01

## 2024-01-01 ENCOUNTER — TELEPHONE (OUTPATIENT)
Dept: FAMILY MEDICINE | Facility: CLINIC | Age: 79
End: 2024-01-01
Payer: COMMERCIAL

## 2024-01-01 ENCOUNTER — TELEPHONE (OUTPATIENT)
Dept: OTHER | Facility: CLINIC | Age: 79
End: 2024-01-01

## 2024-01-01 ENCOUNTER — TRANSFERRED RECORDS (OUTPATIENT)
Dept: HEALTH INFORMATION MANAGEMENT | Facility: CLINIC | Age: 79
End: 2024-01-01
Payer: COMMERCIAL

## 2024-01-01 ENCOUNTER — OFFICE VISIT (OUTPATIENT)
Dept: FAMILY MEDICINE | Facility: CLINIC | Age: 79
End: 2024-01-01
Payer: COMMERCIAL

## 2024-01-01 ENCOUNTER — VIRTUAL VISIT (OUTPATIENT)
Dept: FAMILY MEDICINE | Facility: CLINIC | Age: 79
End: 2024-01-01
Payer: COMMERCIAL

## 2024-01-01 ENCOUNTER — HOSPITAL ENCOUNTER (INPATIENT)
Facility: CLINIC | Age: 79
LOS: 3 days | Discharge: HOME | DRG: 280 | End: 2024-04-15
Attending: EMERGENCY MEDICINE | Admitting: HOSPITALIST
Payer: COMMERCIAL

## 2024-01-01 ENCOUNTER — NURSE TRIAGE (OUTPATIENT)
Dept: FAMILY MEDICINE | Facility: CLINIC | Age: 79
End: 2024-01-01
Payer: COMMERCIAL

## 2024-01-01 ENCOUNTER — PRE VISIT (OUTPATIENT)
Dept: GASTROENTEROLOGY | Facility: CLINIC | Age: 79
End: 2024-01-01

## 2024-01-01 ENCOUNTER — PATIENT OUTREACH (OUTPATIENT)
Dept: CARE COORDINATION | Facility: CLINIC | Age: 79
End: 2024-01-01

## 2024-01-01 ENCOUNTER — TRANSFERRED RECORDS (OUTPATIENT)
Dept: HEALTH INFORMATION MANAGEMENT | Facility: CLINIC | Age: 79
End: 2024-01-01

## 2024-01-01 ENCOUNTER — TELEPHONE (OUTPATIENT)
Dept: OTHER | Facility: CLINIC | Age: 79
End: 2024-01-01
Payer: COMMERCIAL

## 2024-01-01 ENCOUNTER — HOSPITAL ENCOUNTER (OUTPATIENT)
Dept: ULTRASOUND IMAGING | Facility: CLINIC | Age: 79
Discharge: HOME | End: 2024-04-05
Attending: INTERNAL MEDICINE | Admitting: INTERNAL MEDICINE
Payer: COMMERCIAL

## 2024-01-01 ENCOUNTER — APPOINTMENT (OUTPATIENT)
Dept: MRI IMAGING | Facility: CLINIC | Age: 79
DRG: 280 | End: 2024-01-01
Attending: INTERNAL MEDICINE
Payer: COMMERCIAL

## 2024-01-01 ENCOUNTER — TELEPHONE (OUTPATIENT)
Dept: CARDIOLOGY | Facility: CLINIC | Age: 79
End: 2024-01-01
Payer: COMMERCIAL

## 2024-01-01 ENCOUNTER — OFFICE VISIT (OUTPATIENT)
Dept: OTHER | Facility: CLINIC | Age: 79
End: 2024-01-01
Attending: INTERNAL MEDICINE
Payer: COMMERCIAL

## 2024-01-01 ENCOUNTER — APPOINTMENT (OUTPATIENT)
Dept: CARDIOLOGY | Facility: CLINIC | Age: 79
DRG: 280 | End: 2024-01-01
Attending: INTERNAL MEDICINE
Payer: COMMERCIAL

## 2024-01-01 ENCOUNTER — APPOINTMENT (OUTPATIENT)
Dept: GENERAL RADIOLOGY | Facility: CLINIC | Age: 79
DRG: 280 | End: 2024-01-01
Attending: EMERGENCY MEDICINE
Payer: COMMERCIAL

## 2024-01-01 ENCOUNTER — APPOINTMENT (OUTPATIENT)
Dept: ULTRASOUND IMAGING | Facility: CLINIC | Age: 79
DRG: 280 | End: 2024-01-01
Attending: HOSPITALIST
Payer: COMMERCIAL

## 2024-01-01 VITALS
BODY MASS INDEX: 30.84 KG/M2 | DIASTOLIC BLOOD PRESSURE: 69 MMHG | TEMPERATURE: 97.3 F | WEIGHT: 167.6 LBS | SYSTOLIC BLOOD PRESSURE: 108 MMHG | RESPIRATION RATE: 20 BRPM | HEART RATE: 60 BPM | HEIGHT: 62 IN | OXYGEN SATURATION: 99 %

## 2024-01-01 VITALS
WEIGHT: 168.3 LBS | BODY MASS INDEX: 28.89 KG/M2 | TEMPERATURE: 97.2 F | OXYGEN SATURATION: 96 % | SYSTOLIC BLOOD PRESSURE: 80 MMHG | RESPIRATION RATE: 20 BRPM | HEART RATE: 87 BPM | DIASTOLIC BLOOD PRESSURE: 54 MMHG

## 2024-01-01 VITALS
BODY MASS INDEX: 28.56 KG/M2 | HEART RATE: 67 BPM | HEIGHT: 64 IN | TEMPERATURE: 98.3 F | SYSTOLIC BLOOD PRESSURE: 111 MMHG | DIASTOLIC BLOOD PRESSURE: 52 MMHG | OXYGEN SATURATION: 95 % | RESPIRATION RATE: 26 BRPM | WEIGHT: 167.3 LBS

## 2024-01-01 VITALS
SYSTOLIC BLOOD PRESSURE: 185 MMHG | OXYGEN SATURATION: 96 % | WEIGHT: 185.4 LBS | BODY MASS INDEX: 31.65 KG/M2 | HEIGHT: 64 IN | DIASTOLIC BLOOD PRESSURE: 94 MMHG | HEART RATE: 88 BPM

## 2024-01-01 VITALS
BODY MASS INDEX: 29.59 KG/M2 | TEMPERATURE: 97.2 F | WEIGHT: 173.3 LBS | OXYGEN SATURATION: 93 % | HEART RATE: 88 BPM | RESPIRATION RATE: 18 BRPM | DIASTOLIC BLOOD PRESSURE: 68 MMHG | HEIGHT: 64 IN | SYSTOLIC BLOOD PRESSURE: 98 MMHG

## 2024-01-01 VITALS
HEIGHT: 64 IN | SYSTOLIC BLOOD PRESSURE: 128 MMHG | OXYGEN SATURATION: 99 % | BODY MASS INDEX: 29.81 KG/M2 | DIASTOLIC BLOOD PRESSURE: 70 MMHG | WEIGHT: 174.6 LBS

## 2024-01-01 VITALS
HEART RATE: 63 BPM | HEIGHT: 64 IN | BODY MASS INDEX: 30.81 KG/M2 | OXYGEN SATURATION: 98 % | SYSTOLIC BLOOD PRESSURE: 108 MMHG | DIASTOLIC BLOOD PRESSURE: 69 MMHG | TEMPERATURE: 98.1 F | RESPIRATION RATE: 16 BRPM | WEIGHT: 180.5 LBS

## 2024-01-01 VITALS
SYSTOLIC BLOOD PRESSURE: 110 MMHG | DIASTOLIC BLOOD PRESSURE: 65 MMHG | WEIGHT: 166.2 LBS | HEIGHT: 62 IN | BODY MASS INDEX: 30.59 KG/M2 | HEART RATE: 112 BPM | OXYGEN SATURATION: 96 %

## 2024-01-01 DIAGNOSIS — G47.33 OSA (OBSTRUCTIVE SLEEP APNEA): ICD-10-CM

## 2024-01-01 DIAGNOSIS — R60.0 LOWER EXTREMITY EDEMA: ICD-10-CM

## 2024-01-01 DIAGNOSIS — E78.5 HYPERLIPIDEMIA LDL GOAL <70: ICD-10-CM

## 2024-01-01 DIAGNOSIS — M48.00 SPINAL STENOSIS, UNSPECIFIED SPINAL REGION: ICD-10-CM

## 2024-01-01 DIAGNOSIS — I10 ESSENTIAL HYPERTENSION: ICD-10-CM

## 2024-01-01 DIAGNOSIS — I10 BENIGN ESSENTIAL HYPERTENSION: ICD-10-CM

## 2024-01-01 DIAGNOSIS — F51.04 CHRONIC INSOMNIA: ICD-10-CM

## 2024-01-01 DIAGNOSIS — G89.4 CHRONIC PAIN SYNDROME: ICD-10-CM

## 2024-01-01 DIAGNOSIS — I95.9 HYPOTENSION, UNSPECIFIED HYPOTENSION TYPE: ICD-10-CM

## 2024-01-01 DIAGNOSIS — R06.09 EXERTIONAL DYSPNEA: ICD-10-CM

## 2024-01-01 DIAGNOSIS — I71.21 ANEURYSM OF ASCENDING AORTA WITHOUT RUPTURE (H): ICD-10-CM

## 2024-01-01 DIAGNOSIS — R09.89 LEFT CAROTID BRUIT: ICD-10-CM

## 2024-01-01 DIAGNOSIS — R09.89 ABNORMAL PERIPHERAL PULSE: ICD-10-CM

## 2024-01-01 DIAGNOSIS — E66.01 MORBID (SEVERE) OBESITY DUE TO EXCESS CALORIES (H): ICD-10-CM

## 2024-01-01 DIAGNOSIS — R06.09 EXERTIONAL DYSPNEA: Primary | ICD-10-CM

## 2024-01-01 DIAGNOSIS — F10.21 ALCOHOL USE DISORDER, SEVERE, IN EARLY REMISSION (H): ICD-10-CM

## 2024-01-01 DIAGNOSIS — I27.20 PULMONARY HYPERTENSION (H): ICD-10-CM

## 2024-01-01 DIAGNOSIS — R06.09 DYSPNEA ON EXERTION: ICD-10-CM

## 2024-01-01 DIAGNOSIS — F32.0 MILD MAJOR DEPRESSION (H): ICD-10-CM

## 2024-01-01 DIAGNOSIS — M79.89 LEG SWELLING: ICD-10-CM

## 2024-01-01 DIAGNOSIS — N18.31 STAGE 3A CHRONIC KIDNEY DISEASE (H): ICD-10-CM

## 2024-01-01 DIAGNOSIS — R60.0 LOWER EXTREMITY EDEMA: Primary | ICD-10-CM

## 2024-01-01 DIAGNOSIS — R73.01 IFG (IMPAIRED FASTING GLUCOSE): Primary | ICD-10-CM

## 2024-01-01 DIAGNOSIS — R79.89 ELEVATED TROPONIN: ICD-10-CM

## 2024-01-01 DIAGNOSIS — Z95.2 HISTORY OF AORTIC VALVE REPLACEMENT: ICD-10-CM

## 2024-01-01 DIAGNOSIS — Z95.2 S/P TAVR (TRANSCATHETER AORTIC VALVE REPLACEMENT): ICD-10-CM

## 2024-01-01 DIAGNOSIS — Z09 HOSPITAL DISCHARGE FOLLOW-UP: Primary | ICD-10-CM

## 2024-01-01 DIAGNOSIS — N18.30 STAGE 3 CHRONIC KIDNEY DISEASE, UNSPECIFIED WHETHER STAGE 3A OR 3B CKD (H): ICD-10-CM

## 2024-01-01 DIAGNOSIS — I21.4 NSTEMI (NON-ST ELEVATED MYOCARDIAL INFARCTION) (H): ICD-10-CM

## 2024-01-01 DIAGNOSIS — K70.9 LIVER DISEASE, CHRONIC, DUE TO ALCOHOL (H): Primary | ICD-10-CM

## 2024-01-01 DIAGNOSIS — I50.32 CHRONIC DIASTOLIC (CONGESTIVE) HEART FAILURE (H): ICD-10-CM

## 2024-01-01 DIAGNOSIS — I71.21 ANEURYSM OF ASCENDING AORTA WITHOUT RUPTURE (H): Primary | ICD-10-CM

## 2024-01-01 DIAGNOSIS — I21.4 NSTEMI (NON-ST ELEVATED MYOCARDIAL INFARCTION) (H): Primary | ICD-10-CM

## 2024-01-01 DIAGNOSIS — Z95.2 HISTORY OF TRANSCATHETER AORTIC VALVE REPLACEMENT (TAVR): ICD-10-CM

## 2024-01-01 DIAGNOSIS — B00.1 COLD SORE: ICD-10-CM

## 2024-01-01 DIAGNOSIS — I95.9 HYPOTENSION, UNSPECIFIED HYPOTENSION TYPE: Primary | ICD-10-CM

## 2024-01-01 LAB
ACT BLD: 147 SECONDS (ref 74–150)
ALBUMIN SERPL BCG-MCNC: 3.8 G/DL (ref 3.5–5.2)
ALBUMIN SERPL BCG-MCNC: 3.9 G/DL (ref 3.5–5.2)
ALP SERPL-CCNC: 64 U/L (ref 40–150)
ALP SERPL-CCNC: 71 U/L (ref 40–150)
ALT SERPL W P-5'-P-CCNC: 14 U/L (ref 0–50)
ALT SERPL W P-5'-P-CCNC: 15 U/L (ref 0–50)
ANION GAP SERPL CALCULATED.3IONS-SCNC: 13 MMOL/L (ref 7–15)
ANION GAP SERPL CALCULATED.3IONS-SCNC: 13 MMOL/L (ref 7–15)
ANION GAP SERPL CALCULATED.3IONS-SCNC: 14 MMOL/L (ref 7–15)
ANION GAP SERPL CALCULATED.3IONS-SCNC: 8 MMOL/L (ref 7–15)
ANION GAP SERPL CALCULATED.3IONS-SCNC: 9 MMOL/L (ref 7–15)
ANION GAP SERPL CALCULATED.3IONS-SCNC: 9 MMOL/L (ref 7–15)
APO A-I SERPL-MCNC: 8 MG/DL
AST SERPL W P-5'-P-CCNC: 36 U/L (ref 0–45)
AST SERPL W P-5'-P-CCNC: 43 U/L (ref 0–45)
ATRIAL RATE - MUSE: 74 BPM
BACTERIA BLD CULT: NO GROWTH
BACTERIA BLD CULT: NO GROWTH
BASOPHILS # BLD AUTO: 0.1 10E3/UL (ref 0–0.2)
BASOPHILS NFR BLD AUTO: 1 %
BILIRUB SERPL-MCNC: 0.4 MG/DL
BILIRUB SERPL-MCNC: 0.5 MG/DL
BUN SERPL-MCNC: 10.7 MG/DL (ref 8–23)
BUN SERPL-MCNC: 12.3 MG/DL (ref 8–23)
BUN SERPL-MCNC: 13.9 MG/DL (ref 8–23)
BUN SERPL-MCNC: 14.5 MG/DL (ref 8–23)
BUN SERPL-MCNC: 15.3 MG/DL (ref 8–23)
BUN SERPL-MCNC: 8.8 MG/DL (ref 8–23)
CALCIUM SERPL-MCNC: 8.7 MG/DL (ref 8.8–10.2)
CALCIUM SERPL-MCNC: 8.9 MG/DL (ref 8.8–10.2)
CALCIUM SERPL-MCNC: 9 MG/DL (ref 8.8–10.2)
CALCIUM SERPL-MCNC: 9.1 MG/DL (ref 8.8–10.2)
CALCIUM SERPL-MCNC: 9.2 MG/DL (ref 8.8–10.2)
CALCIUM SERPL-MCNC: 9.2 MG/DL (ref 8.8–10.2)
CHLORIDE SERPL-SCNC: 101 MMOL/L (ref 98–107)
CHLORIDE SERPL-SCNC: 101 MMOL/L (ref 98–107)
CHLORIDE SERPL-SCNC: 106 MMOL/L (ref 98–107)
CHLORIDE SERPL-SCNC: 96 MMOL/L (ref 98–107)
CHLORIDE SERPL-SCNC: 98 MMOL/L (ref 98–107)
CHLORIDE SERPL-SCNC: 98 MMOL/L (ref 98–107)
CHOLEST SERPL-MCNC: 195 MG/DL
CHOLEST SERPL-MCNC: 219 MG/DL
CREAT SERPL-MCNC: 1.02 MG/DL (ref 0.51–0.95)
CREAT SERPL-MCNC: 1.07 MG/DL (ref 0.51–0.95)
CREAT SERPL-MCNC: 1.08 MG/DL (ref 0.51–0.95)
CREAT SERPL-MCNC: 1.09 MG/DL (ref 0.51–0.95)
CREAT SERPL-MCNC: 1.17 MG/DL (ref 0.51–0.95)
CREAT SERPL-MCNC: 1.17 MG/DL (ref 0.51–0.95)
CRP SERPL HS-MCNC: 0.22 MG/L
CV STRESS MAX HR HE: 66
DEPRECATED HCO3 PLAS-SCNC: 23 MMOL/L (ref 22–29)
DEPRECATED HCO3 PLAS-SCNC: 24 MMOL/L (ref 22–29)
DEPRECATED HCO3 PLAS-SCNC: 26 MMOL/L (ref 22–29)
DEPRECATED HCO3 PLAS-SCNC: 28 MMOL/L (ref 22–29)
DEPRECATED HCO3 PLAS-SCNC: 29 MMOL/L (ref 22–29)
DEPRECATED HCO3 PLAS-SCNC: 29 MMOL/L (ref 22–29)
DIASTOLIC BLOOD PRESSURE - MUSE: NORMAL MMHG
EGFRCR SERPLBLD CKD-EPI 2021: 47 ML/MIN/1.73M2
EGFRCR SERPLBLD CKD-EPI 2021: 47 ML/MIN/1.73M2
EGFRCR SERPLBLD CKD-EPI 2021: 51 ML/MIN/1.73M2
EGFRCR SERPLBLD CKD-EPI 2021: 52 ML/MIN/1.73M2
EGFRCR SERPLBLD CKD-EPI 2021: 52 ML/MIN/1.73M2
EGFRCR SERPLBLD CKD-EPI 2021: 55 ML/MIN/1.73M2
EOSINOPHIL # BLD AUTO: 1.2 10E3/UL (ref 0–0.7)
EOSINOPHIL # BLD AUTO: 1.2 10E3/UL (ref 0–0.7)
EOSINOPHIL # BLD AUTO: 1.4 10E3/UL (ref 0–0.7)
EOSINOPHIL NFR BLD AUTO: 18 %
EOSINOPHIL NFR BLD AUTO: 20 %
EOSINOPHIL NFR BLD AUTO: 21 %
ERYTHROCYTE [DISTWIDTH] IN BLOOD BY AUTOMATED COUNT: 13.6 % (ref 10–15)
ERYTHROCYTE [DISTWIDTH] IN BLOOD BY AUTOMATED COUNT: 13.7 % (ref 10–15)
ERYTHROCYTE [DISTWIDTH] IN BLOOD BY AUTOMATED COUNT: 13.7 % (ref 10–15)
GLUCOSE SERPL-MCNC: 101 MG/DL (ref 70–99)
GLUCOSE SERPL-MCNC: 104 MG/DL (ref 70–99)
GLUCOSE SERPL-MCNC: 108 MG/DL (ref 70–99)
GLUCOSE SERPL-MCNC: 91 MG/DL (ref 70–99)
GLUCOSE SERPL-MCNC: 93 MG/DL (ref 70–99)
GLUCOSE SERPL-MCNC: 96 MG/DL (ref 70–99)
HBA1C MFR BLD: 5.1 %
HCT VFR BLD AUTO: 31 % (ref 35–47)
HCT VFR BLD AUTO: 34 % (ref 35–47)
HCT VFR BLD AUTO: 37.2 % (ref 35–47)
HDL SERPL QN: >11 NM
HDL SERPL-SCNC: >41 UMOL/L
HDLC SERPL-MCNC: 138 MG/DL
HDLC SERPL-MCNC: 81 MG/DL
HGB BLD-MCNC: 10.4 G/DL (ref 11.7–15.7)
HGB BLD-MCNC: 11.2 G/DL (ref 11.7–15.7)
HGB BLD-MCNC: 12.5 G/DL (ref 11.7–15.7)
HLD.LARGE SERPL-SCNC: >17 UMOL/L
HOLD SPECIMEN: NORMAL
IMM GRANULOCYTES # BLD: 0 10E3/UL
IMM GRANULOCYTES NFR BLD: 0 %
INTERPRETATION ECG - MUSE: NORMAL
LACTATE SERPL-SCNC: 1.8 MMOL/L (ref 0.7–2)
LDL SERPL QN: 20.9 NM
LDL SERPL-SCNC: 1114 NMOL/L
LDL SMALL SERPL-SCNC: 320 NMOL/L
LDLC SERPL CALC-MCNC: 53 MG/DL
LDLC SERPL CALC-MCNC: 98 MG/DL
LVEF ECHO: NORMAL
LYMPHOCYTES # BLD AUTO: 1.4 10E3/UL (ref 0.8–5.3)
LYMPHOCYTES # BLD AUTO: 2.2 10E3/UL (ref 0.8–5.3)
LYMPHOCYTES # BLD AUTO: 2.2 10E3/UL (ref 0.8–5.3)
LYMPHOCYTES NFR BLD AUTO: 21 %
LYMPHOCYTES NFR BLD AUTO: 32 %
LYMPHOCYTES NFR BLD AUTO: 38 %
MCH RBC QN AUTO: 33.9 PG (ref 26.5–33)
MCH RBC QN AUTO: 34.1 PG (ref 26.5–33)
MCH RBC QN AUTO: 34.4 PG (ref 26.5–33)
MCHC RBC AUTO-ENTMCNC: 32.9 G/DL (ref 31.5–36.5)
MCHC RBC AUTO-ENTMCNC: 33.5 G/DL (ref 31.5–36.5)
MCHC RBC AUTO-ENTMCNC: 33.6 G/DL (ref 31.5–36.5)
MCV RBC AUTO: 102 FL (ref 78–100)
MCV RBC AUTO: 103 FL (ref 78–100)
MCV RBC AUTO: 103 FL (ref 78–100)
MONOCYTES # BLD AUTO: 0.7 10E3/UL (ref 0–1.3)
MONOCYTES # BLD AUTO: 0.8 10E3/UL (ref 0–1.3)
MONOCYTES # BLD AUTO: 0.8 10E3/UL (ref 0–1.3)
MONOCYTES NFR BLD AUTO: 11 %
MONOCYTES NFR BLD AUTO: 12 %
MONOCYTES NFR BLD AUTO: 14 %
NEUTROPHILS # BLD AUTO: 1.6 10E3/UL (ref 1.6–8.3)
NEUTROPHILS # BLD AUTO: 2.3 10E3/UL (ref 1.6–8.3)
NEUTROPHILS # BLD AUTO: 3.3 10E3/UL (ref 1.6–8.3)
NEUTROPHILS NFR BLD AUTO: 27 %
NEUTROPHILS NFR BLD AUTO: 34 %
NEUTROPHILS NFR BLD AUTO: 49 %
NONHDLC SERPL-MCNC: 114 MG/DL
NRBC # BLD AUTO: 0 10E3/UL
NRBC BLD AUTO-RTO: 0 /100
NT-PROBNP SERPL-MCNC: 600 PG/ML (ref 0–1800)
NUC STRESS EJECTION FRACTION: 82 %
P AXIS - MUSE: 12 DEGREES
PATHOLOGY STUDY: ABNORMAL
PLATELET # BLD AUTO: 244 10E3/UL (ref 150–450)
PLATELET # BLD AUTO: 248 10E3/UL (ref 150–450)
PLATELET # BLD AUTO: 275 10E3/UL (ref 150–450)
POTASSIUM SERPL-SCNC: 3.3 MMOL/L (ref 3.4–5.3)
POTASSIUM SERPL-SCNC: 3.9 MMOL/L (ref 3.4–5.3)
POTASSIUM SERPL-SCNC: 4 MMOL/L (ref 3.4–5.3)
POTASSIUM SERPL-SCNC: 4.1 MMOL/L (ref 3.4–5.3)
POTASSIUM SERPL-SCNC: 4.2 MMOL/L (ref 3.4–5.3)
POTASSIUM SERPL-SCNC: 4.7 MMOL/L (ref 3.4–5.3)
PR INTERVAL - MUSE: 210 MS
PROT SERPL-MCNC: 6.9 G/DL (ref 6.4–8.3)
PROT SERPL-MCNC: 7 G/DL (ref 6.4–8.3)
QRS DURATION - MUSE: 114 MS
QT - MUSE: 430 MS
QTC - MUSE: 477 MS
R AXIS - MUSE: -52 DEGREES
RATE PRESSURE PRODUCT: 9108
RBC # BLD AUTO: 3.05 10E6/UL (ref 3.8–5.2)
RBC # BLD AUTO: 3.3 10E6/UL (ref 3.8–5.2)
RBC # BLD AUTO: 3.63 10E6/UL (ref 3.8–5.2)
SODIUM SERPL-SCNC: 133 MMOL/L (ref 135–145)
SODIUM SERPL-SCNC: 135 MMOL/L (ref 135–145)
SODIUM SERPL-SCNC: 137 MMOL/L (ref 135–145)
SODIUM SERPL-SCNC: 138 MMOL/L (ref 135–145)
SODIUM SERPL-SCNC: 139 MMOL/L (ref 135–145)
SODIUM SERPL-SCNC: 143 MMOL/L (ref 135–145)
STRESS ECHO BASELINE DIASTOLIC HE: 78
STRESS ECHO BASELINE HR: 60 BPM
STRESS ECHO BASELINE SYSTOLIC BP: 142
STRESS ECHO CALCULATED PERCENT HR: 47 %
STRESS ECHO LAST STRESS DIASTOLIC BP: 76
STRESS ECHO LAST STRESS SYSTOLIC BP: 138
STRESS ECHO TARGET HR: 140
SYSTOLIC BLOOD PRESSURE - MUSE: NORMAL MMHG
T AXIS - MUSE: 40 DEGREES
TRIGL SERPL-MCNC: 142 MG/DL
TRIGL SERPL-MCNC: 79 MG/DL
TROPONIN T SERPL HS-MCNC: 22 NG/L
TROPONIN T SERPL HS-MCNC: 27 NG/L
UFH PPP CHRO-ACNC: 0.24 IU/ML
UFH PPP CHRO-ACNC: 0.34 IU/ML
UFH PPP CHRO-ACNC: 0.41 IU/ML
UFH PPP CHRO-ACNC: 0.49 IU/ML
UFH PPP CHRO-ACNC: 0.5 IU/ML
UFH PPP CHRO-ACNC: >1.1 IU/ML
VENTRICULAR RATE- MUSE: 74 BPM
VLDL LARGE SERPL-SCNC: 3.7 NMOL/L
VLDL SERPL QN: 54.9 NM
WBC # BLD AUTO: 5.8 10E3/UL (ref 4–11)
WBC # BLD AUTO: 6.6 10E3/UL (ref 4–11)
WBC # BLD AUTO: 6.8 10E3/UL (ref 4–11)

## 2024-01-01 PROCEDURE — 99207 PR SC NO CHARGE VISIT: CPT | Performed by: INTERNAL MEDICINE

## 2024-01-01 PROCEDURE — 78452 HT MUSCLE IMAGE SPECT MULT: CPT

## 2024-01-01 PROCEDURE — 93016 CV STRESS TEST SUPVJ ONLY: CPT | Performed by: INTERNAL MEDICINE

## 2024-01-01 PROCEDURE — 93018 CV STRESS TEST I&R ONLY: CPT | Performed by: INTERNAL MEDICINE

## 2024-01-01 PROCEDURE — C1760 CLOSURE DEV, VASC: HCPCS | Performed by: INTERNAL MEDICINE

## 2024-01-01 PROCEDURE — 80053 COMPREHEN METABOLIC PANEL: CPT

## 2024-01-01 PROCEDURE — 250N000011 HC RX IP 250 OP 636: Performed by: HOSPITALIST

## 2024-01-01 PROCEDURE — 84484 ASSAY OF TROPONIN QUANT: CPT | Performed by: HOSPITALIST

## 2024-01-01 PROCEDURE — 85520 HEPARIN ASSAY: CPT | Performed by: HOSPITALIST

## 2024-01-01 PROCEDURE — 99233 SBSQ HOSP IP/OBS HIGH 50: CPT | Mod: 25

## 2024-01-01 PROCEDURE — 85025 COMPLETE CBC W/AUTO DIFF WBC: CPT | Performed by: INTERNAL MEDICINE

## 2024-01-01 PROCEDURE — 83036 HEMOGLOBIN GLYCOSYLATED A1C: CPT | Performed by: HOSPITALIST

## 2024-01-01 PROCEDURE — 36415 COLL VENOUS BLD VENIPUNCTURE: CPT | Performed by: HOSPITALIST

## 2024-01-01 PROCEDURE — 99152 MOD SED SAME PHYS/QHP 5/>YRS: CPT | Performed by: INTERNAL MEDICINE

## 2024-01-01 PROCEDURE — 99232 SBSQ HOSP IP/OBS MODERATE 35: CPT | Performed by: INTERNAL MEDICINE

## 2024-01-01 PROCEDURE — 93925 LOWER EXTREMITY STUDY: CPT

## 2024-01-01 PROCEDURE — 85347 COAGULATION TIME ACTIVATED: CPT

## 2024-01-01 PROCEDURE — 85520 HEPARIN ASSAY: CPT | Performed by: INTERNAL MEDICINE

## 2024-01-01 PROCEDURE — 210N000002 HC R&B HEART CARE

## 2024-01-01 PROCEDURE — 99214 OFFICE O/P EST MOD 30 MIN: CPT | Performed by: PHYSICIAN ASSISTANT

## 2024-01-01 PROCEDURE — 99214 OFFICE O/P EST MOD 30 MIN: CPT | Performed by: INTERNAL MEDICINE

## 2024-01-01 PROCEDURE — 80048 BASIC METABOLIC PNL TOTAL CA: CPT | Performed by: INTERNAL MEDICINE

## 2024-01-01 PROCEDURE — 78452 HT MUSCLE IMAGE SPECT MULT: CPT | Mod: 26 | Performed by: INTERNAL MEDICINE

## 2024-01-01 PROCEDURE — 36415 COLL VENOUS BLD VENIPUNCTURE: CPT | Performed by: INTERNAL MEDICINE

## 2024-01-01 PROCEDURE — 250N000013 HC RX MED GY IP 250 OP 250 PS 637: Performed by: HOSPITALIST

## 2024-01-01 PROCEDURE — 93005 ELECTROCARDIOGRAM TRACING: CPT

## 2024-01-01 PROCEDURE — 250N000011 HC RX IP 250 OP 636: Performed by: INTERNAL MEDICINE

## 2024-01-01 PROCEDURE — 99153 MOD SED SAME PHYS/QHP EA: CPT | Performed by: INTERNAL MEDICINE

## 2024-01-01 PROCEDURE — 80061 LIPID PANEL: CPT | Performed by: HOSPITALIST

## 2024-01-01 PROCEDURE — 250N000013 HC RX MED GY IP 250 OP 250 PS 637: Performed by: INTERNAL MEDICINE

## 2024-01-01 PROCEDURE — 84520 ASSAY OF UREA NITROGEN: CPT | Performed by: EMERGENCY MEDICINE

## 2024-01-01 PROCEDURE — 80048 BASIC METABOLIC PNL TOTAL CA: CPT | Performed by: HOSPITALIST

## 2024-01-01 PROCEDURE — 99239 HOSP IP/OBS DSCHRG MGMT >30: CPT | Performed by: INTERNAL MEDICINE

## 2024-01-01 PROCEDURE — 250N000013 HC RX MED GY IP 250 OP 250 PS 637: Performed by: EMERGENCY MEDICINE

## 2024-01-01 PROCEDURE — 84484 ASSAY OF TROPONIN QUANT: CPT | Performed by: EMERGENCY MEDICINE

## 2024-01-01 PROCEDURE — G0463 HOSPITAL OUTPT CLINIC VISIT: HCPCS | Performed by: INTERNAL MEDICINE

## 2024-01-01 PROCEDURE — A9502 TC99M TETROFOSMIN: HCPCS | Performed by: EMERGENCY MEDICINE

## 2024-01-01 PROCEDURE — 99222 1ST HOSP IP/OBS MODERATE 55: CPT | Performed by: INTERNAL MEDICINE

## 2024-01-01 PROCEDURE — 85025 COMPLETE CBC W/AUTO DIFF WBC: CPT | Performed by: HOSPITALIST

## 2024-01-01 PROCEDURE — B2111ZZ FLUOROSCOPY OF MULTIPLE CORONARY ARTERIES USING LOW OSMOLAR CONTRAST: ICD-10-PCS | Performed by: INTERNAL MEDICINE

## 2024-01-01 PROCEDURE — 83695 ASSAY OF LIPOPROTEIN(A): CPT

## 2024-01-01 PROCEDURE — 250N000011 HC RX IP 250 OP 636: Performed by: EMERGENCY MEDICINE

## 2024-01-01 PROCEDURE — 72141 MRI NECK SPINE W/O DYE: CPT

## 2024-01-01 PROCEDURE — G2211 COMPLEX E/M VISIT ADD ON: HCPCS | Performed by: INTERNAL MEDICINE

## 2024-01-01 PROCEDURE — 99233 SBSQ HOSP IP/OBS HIGH 50: CPT | Performed by: INTERNAL MEDICINE

## 2024-01-01 PROCEDURE — 36415 COLL VENOUS BLD VENIPUNCTURE: CPT

## 2024-01-01 PROCEDURE — 87040 BLOOD CULTURE FOR BACTERIA: CPT | Performed by: EMERGENCY MEDICINE

## 2024-01-01 PROCEDURE — 71046 X-RAY EXAM CHEST 2 VIEWS: CPT

## 2024-01-01 PROCEDURE — 343N000001 HC RX 343: Performed by: EMERGENCY MEDICINE

## 2024-01-01 PROCEDURE — 83880 ASSAY OF NATRIURETIC PEPTIDE: CPT | Performed by: EMERGENCY MEDICINE

## 2024-01-01 PROCEDURE — C1894 INTRO/SHEATH, NON-LASER: HCPCS | Performed by: INTERNAL MEDICINE

## 2024-01-01 PROCEDURE — 999N000208 ECHOCARDIOGRAM COMPLETE

## 2024-01-01 PROCEDURE — 93454 CORONARY ARTERY ANGIO S&I: CPT | Mod: 26 | Performed by: INTERNAL MEDICINE

## 2024-01-01 PROCEDURE — 99223 1ST HOSP IP/OBS HIGH 75: CPT | Performed by: HOSPITALIST

## 2024-01-01 PROCEDURE — 258N000003 HC RX IP 258 OP 636: Performed by: INTERNAL MEDICINE

## 2024-01-01 PROCEDURE — 93970 EXTREMITY STUDY: CPT

## 2024-01-01 PROCEDURE — 80048 BASIC METABOLIC PNL TOTAL CA: CPT

## 2024-01-01 PROCEDURE — 99441 PR PHYSICIAN TELEPHONE EVALUATION 5-10 MIN: CPT | Mod: 93 | Performed by: PHYSICIAN ASSISTANT

## 2024-01-01 PROCEDURE — 272N000001 HC OR GENERAL SUPPLY STERILE: Performed by: INTERNAL MEDICINE

## 2024-01-01 PROCEDURE — 70551 MRI BRAIN STEM W/O DYE: CPT

## 2024-01-01 PROCEDURE — 99285 EMERGENCY DEPT VISIT HI MDM: CPT | Mod: 25

## 2024-01-01 PROCEDURE — 93306 TTE W/DOPPLER COMPLETE: CPT | Mod: 26 | Performed by: INTERNAL MEDICINE

## 2024-01-01 PROCEDURE — 250N000009 HC RX 250: Performed by: INTERNAL MEDICINE

## 2024-01-01 PROCEDURE — 250N000011 HC RX IP 250 OP 636: Mod: JZ | Performed by: INTERNAL MEDICINE

## 2024-01-01 PROCEDURE — 85025 COMPLETE CBC W/AUTO DIFF WBC: CPT | Performed by: EMERGENCY MEDICINE

## 2024-01-01 PROCEDURE — 36415 COLL VENOUS BLD VENIPUNCTURE: CPT | Performed by: EMERGENCY MEDICINE

## 2024-01-01 PROCEDURE — 80061 LIPID PANEL: CPT | Mod: 90

## 2024-01-01 PROCEDURE — 86141 C-REACTIVE PROTEIN HS: CPT

## 2024-01-01 PROCEDURE — 99000 SPECIMEN HANDLING OFFICE-LAB: CPT

## 2024-01-01 PROCEDURE — 999N000147 HC STATISTIC PT IP EVAL DEFER

## 2024-01-01 PROCEDURE — 83605 ASSAY OF LACTIC ACID: CPT | Performed by: EMERGENCY MEDICINE

## 2024-01-01 PROCEDURE — 83704 LIPOPROTEIN BLD QUAN PART: CPT | Mod: 90

## 2024-01-01 PROCEDURE — 93454 CORONARY ARTERY ANGIO S&I: CPT | Performed by: INTERNAL MEDICINE

## 2024-01-01 PROCEDURE — 255N000002 HC RX 255 OP 636: Performed by: HOSPITALIST

## 2024-01-01 DEVICE — CLOSURE ANGIOSEAL 6FR 610130: Type: IMPLANTABLE DEVICE | Status: FUNCTIONAL

## 2024-01-01 RX ORDER — GABAPENTIN 300 MG/1
300 CAPSULE ORAL EVERY MORNING
Qty: 90 CAPSULE | Refills: 0 | Status: SHIPPED | OUTPATIENT
Start: 2024-01-01 | End: 2024-01-01

## 2024-01-01 RX ORDER — NALOXONE HYDROCHLORIDE 0.4 MG/ML
0.4 INJECTION, SOLUTION INTRAMUSCULAR; INTRAVENOUS; SUBCUTANEOUS
Status: DISCONTINUED | OUTPATIENT
Start: 2024-01-01 | End: 2024-01-01 | Stop reason: HOSPADM

## 2024-01-01 RX ORDER — BISACODYL 5 MG
5 TABLET, DELAYED RELEASE (ENTERIC COATED) ORAL DAILY PRN
Status: DISCONTINUED | OUTPATIENT
Start: 2024-01-01 | End: 2024-01-01 | Stop reason: HOSPADM

## 2024-01-01 RX ORDER — ALBUTEROL SULFATE 90 UG/1
2 AEROSOL, METERED RESPIRATORY (INHALATION) EVERY 5 MIN PRN
Status: DISCONTINUED | OUTPATIENT
Start: 2024-01-01 | End: 2024-01-01 | Stop reason: HOSPADM

## 2024-01-01 RX ORDER — POTASSIUM CHLORIDE 1500 MG/1
20 TABLET, EXTENDED RELEASE ORAL DAILY
Qty: 10 TABLET | Refills: 0 | Status: SHIPPED | OUTPATIENT
Start: 2024-01-01

## 2024-01-01 RX ORDER — SODIUM CHLORIDE 9 MG/ML
INJECTION, SOLUTION INTRAVENOUS CONTINUOUS
Status: DISCONTINUED | OUTPATIENT
Start: 2024-01-01 | End: 2024-01-01 | Stop reason: HOSPADM

## 2024-01-01 RX ORDER — VALACYCLOVIR HYDROCHLORIDE 1 G/1
1 TABLET, FILM COATED ORAL PRN
Qty: 4 TABLET | Refills: 3 | Status: SHIPPED | OUTPATIENT
Start: 2024-01-01

## 2024-01-01 RX ORDER — IOPAMIDOL 755 MG/ML
INJECTION, SOLUTION INTRAVASCULAR
Status: DISCONTINUED | OUTPATIENT
Start: 2024-01-01 | End: 2024-01-01 | Stop reason: HOSPADM

## 2024-01-01 RX ORDER — FOLIC ACID 1 MG/1
1 TABLET ORAL DAILY
Status: DISCONTINUED | OUTPATIENT
Start: 2024-01-01 | End: 2024-01-01 | Stop reason: HOSPADM

## 2024-01-01 RX ORDER — ONDANSETRON 2 MG/ML
4 INJECTION INTRAMUSCULAR; INTRAVENOUS EVERY 6 HOURS PRN
Status: DISCONTINUED | OUTPATIENT
Start: 2024-01-01 | End: 2024-01-01 | Stop reason: HOSPADM

## 2024-01-01 RX ORDER — CITALOPRAM HYDROBROMIDE 20 MG/1
20 TABLET ORAL DAILY
Qty: 90 TABLET | Refills: 3 | Status: SHIPPED | OUTPATIENT
Start: 2024-01-01

## 2024-01-01 RX ORDER — ASPIRIN 81 MG/1
81 TABLET ORAL DAILY
Status: DISCONTINUED | OUTPATIENT
Start: 2024-01-01 | End: 2024-01-01 | Stop reason: HOSPADM

## 2024-01-01 RX ORDER — METOPROLOL TARTRATE 25 MG/1
37.5 TABLET, FILM COATED ORAL 2 TIMES DAILY
Qty: 135 TABLET | Refills: 3 | Status: SHIPPED | OUTPATIENT
Start: 2024-01-01 | End: 2024-01-01

## 2024-01-01 RX ORDER — REGADENOSON 0.08 MG/ML
0.4 INJECTION, SOLUTION INTRAVENOUS ONCE
Status: COMPLETED | OUTPATIENT
Start: 2024-01-01 | End: 2024-01-01

## 2024-01-01 RX ORDER — GABAPENTIN 600 MG/1
600 TABLET ORAL AT BEDTIME
Qty: 90 TABLET | Refills: 1 | Status: SHIPPED | OUTPATIENT
Start: 2024-01-01

## 2024-01-01 RX ORDER — LIDOCAINE 40 MG/G
CREAM TOPICAL
Status: DISCONTINUED | OUTPATIENT
Start: 2024-01-01 | End: 2024-01-01 | Stop reason: HOSPADM

## 2024-01-01 RX ORDER — SODIUM CHLORIDE 9 MG/ML
75 INJECTION, SOLUTION INTRAVENOUS CONTINUOUS
Status: ACTIVE | OUTPATIENT
Start: 2024-01-01 | End: 2024-01-01

## 2024-01-01 RX ORDER — TRAZODONE HYDROCHLORIDE 100 MG/1
100 TABLET ORAL AT BEDTIME
Qty: 30 TABLET | Refills: 1 | OUTPATIENT
Start: 2024-01-01

## 2024-01-01 RX ORDER — TRAZODONE HYDROCHLORIDE 50 MG/1
100 TABLET, FILM COATED ORAL AT BEDTIME
Status: DISCONTINUED | OUTPATIENT
Start: 2024-01-01 | End: 2024-01-01 | Stop reason: HOSPADM

## 2024-01-01 RX ORDER — CEFADROXIL 500 MG/1
500 CAPSULE ORAL 2 TIMES DAILY
Status: DISCONTINUED | OUTPATIENT
Start: 2024-01-01 | End: 2024-01-01 | Stop reason: HOSPADM

## 2024-01-01 RX ORDER — LORAZEPAM 1 MG/1
2 TABLET ORAL
Status: COMPLETED | OUTPATIENT
Start: 2024-01-01 | End: 2024-01-01

## 2024-01-01 RX ORDER — GABAPENTIN 300 MG/1
600 CAPSULE ORAL ONCE
Status: COMPLETED | OUTPATIENT
Start: 2024-01-01 | End: 2024-01-01

## 2024-01-01 RX ORDER — CEFADROXIL 500 MG/1
500 CAPSULE ORAL 2 TIMES DAILY
Qty: 14 CAPSULE | Refills: 0 | Status: SHIPPED | OUTPATIENT
Start: 2024-01-01 | End: 2024-01-01

## 2024-01-01 RX ORDER — ASPIRIN 81 MG/1
81 TABLET, CHEWABLE ORAL DAILY
Status: DISCONTINUED | OUTPATIENT
Start: 2024-01-01 | End: 2024-01-01 | Stop reason: HOSPADM

## 2024-01-01 RX ORDER — HEPARIN SODIUM 10000 [USP'U]/100ML
0-5000 INJECTION, SOLUTION INTRAVENOUS CONTINUOUS
Status: DISCONTINUED | OUTPATIENT
Start: 2024-01-01 | End: 2024-01-01

## 2024-01-01 RX ORDER — ROSUVASTATIN CALCIUM 20 MG/1
20 TABLET, COATED ORAL DAILY
Qty: 90 TABLET | Refills: 3 | Status: SHIPPED | OUTPATIENT
Start: 2024-01-01

## 2024-01-01 RX ORDER — LORAZEPAM 0.5 MG/1
0.5 TABLET ORAL
Status: DISCONTINUED | OUTPATIENT
Start: 2024-01-01 | End: 2024-01-01 | Stop reason: HOSPADM

## 2024-01-01 RX ORDER — METOPROLOL TARTRATE 25 MG/1
37.5 TABLET, FILM COATED ORAL 2 TIMES DAILY
Qty: 135 TABLET | Refills: 3 | Status: SHIPPED | OUTPATIENT
Start: 2024-01-01

## 2024-01-01 RX ORDER — AMOXICILLIN 250 MG
1 CAPSULE ORAL 2 TIMES DAILY PRN
Status: DISCONTINUED | OUTPATIENT
Start: 2024-01-01 | End: 2024-01-01 | Stop reason: HOSPADM

## 2024-01-01 RX ORDER — AMOXICILLIN 250 MG
2 CAPSULE ORAL 2 TIMES DAILY PRN
Status: DISCONTINUED | OUTPATIENT
Start: 2024-01-01 | End: 2024-01-01 | Stop reason: HOSPADM

## 2024-01-01 RX ORDER — GABAPENTIN 600 MG/1
600 TABLET ORAL AT BEDTIME
Status: DISCONTINUED | OUTPATIENT
Start: 2024-01-01 | End: 2024-01-01 | Stop reason: HOSPADM

## 2024-01-01 RX ORDER — CALCIUM CARBONATE 500 MG/1
1000 TABLET, CHEWABLE ORAL 4 TIMES DAILY PRN
Status: DISCONTINUED | OUTPATIENT
Start: 2024-01-01 | End: 2024-01-01 | Stop reason: HOSPADM

## 2024-01-01 RX ORDER — POTASSIUM CHLORIDE 1500 MG/1
20 TABLET, EXTENDED RELEASE ORAL
Status: DISCONTINUED | OUTPATIENT
Start: 2024-01-01 | End: 2024-01-01 | Stop reason: HOSPADM

## 2024-01-01 RX ORDER — ASPIRIN 325 MG
325 TABLET ORAL ONCE
Qty: 1 TABLET | Refills: 0 | Status: COMPLETED | OUTPATIENT
Start: 2024-01-01 | End: 2024-01-01

## 2024-01-01 RX ORDER — LOSARTAN POTASSIUM 50 MG/1
50 TABLET ORAL DAILY
Status: DISCONTINUED | OUTPATIENT
Start: 2024-01-01 | End: 2024-01-01 | Stop reason: HOSPADM

## 2024-01-01 RX ORDER — OXYCODONE HYDROCHLORIDE 5 MG/1
5 TABLET ORAL EVERY 4 HOURS PRN
Status: DISCONTINUED | OUTPATIENT
Start: 2024-01-01 | End: 2024-01-01 | Stop reason: HOSPADM

## 2024-01-01 RX ORDER — FLUMAZENIL 0.1 MG/ML
0.2 INJECTION, SOLUTION INTRAVENOUS
Status: DISCONTINUED | OUTPATIENT
Start: 2024-01-01 | End: 2024-01-01 | Stop reason: HOSPADM

## 2024-01-01 RX ORDER — POTASSIUM CHLORIDE 750 MG/1
10 TABLET, EXTENDED RELEASE ORAL DAILY
Qty: 90 TABLET | Refills: 0 | Status: SHIPPED | OUTPATIENT
Start: 2024-01-01

## 2024-01-01 RX ORDER — GABAPENTIN 600 MG/1
600 TABLET ORAL AT BEDTIME
Qty: 90 TABLET | Refills: 3 | Status: SHIPPED | OUTPATIENT
Start: 2024-01-01 | End: 2024-01-01

## 2024-01-01 RX ORDER — FUROSEMIDE 20 MG
20 TABLET ORAL DAILY
Qty: 10 TABLET | Refills: 0 | Status: SHIPPED | OUTPATIENT
Start: 2024-01-01 | End: 2024-01-01

## 2024-01-01 RX ORDER — CITALOPRAM HYDROBROMIDE 20 MG/1
20 TABLET ORAL DAILY
Status: DISCONTINUED | OUTPATIENT
Start: 2024-01-01 | End: 2024-01-01 | Stop reason: HOSPADM

## 2024-01-01 RX ORDER — FUROSEMIDE 20 MG
20 TABLET ORAL DAILY
Status: DISCONTINUED | OUTPATIENT
Start: 2024-01-01 | End: 2024-01-01 | Stop reason: HOSPADM

## 2024-01-01 RX ORDER — ACETAMINOPHEN 325 MG/1
650 TABLET ORAL EVERY 4 HOURS PRN
Status: DISCONTINUED | OUTPATIENT
Start: 2024-01-01 | End: 2024-01-01 | Stop reason: HOSPADM

## 2024-01-01 RX ORDER — ZOLPIDEM TARTRATE 12.5 MG/1
12.5 TABLET, FILM COATED, EXTENDED RELEASE ORAL AT BEDTIME
COMMUNITY

## 2024-01-01 RX ORDER — RESPIRATORY SYNCYTIAL VIRUS VACCINE 120MCG/0.5
0.5 KIT INTRAMUSCULAR ONCE
Qty: 1 EACH | Refills: 0 | Status: CANCELLED | OUTPATIENT
Start: 2024-01-01 | End: 2024-01-01

## 2024-01-01 RX ORDER — NALOXONE HYDROCHLORIDE 0.4 MG/ML
0.2 INJECTION, SOLUTION INTRAMUSCULAR; INTRAVENOUS; SUBCUTANEOUS
Status: DISCONTINUED | OUTPATIENT
Start: 2024-01-01 | End: 2024-01-01 | Stop reason: HOSPADM

## 2024-01-01 RX ORDER — DOXEPIN HYDROCHLORIDE 10 MG/1
20 CAPSULE ORAL AT BEDTIME
Qty: 60 CAPSULE | Refills: 0 | OUTPATIENT
Start: 2024-01-01

## 2024-01-01 RX ORDER — LOSARTAN POTASSIUM 50 MG/1
25 TABLET ORAL DAILY
Status: SHIPPED
Start: 2024-01-01 | End: 2024-01-01

## 2024-01-01 RX ORDER — HEPARIN SODIUM 10000 [USP'U]/100ML
100-1000 INJECTION, SOLUTION INTRAVENOUS CONTINUOUS PRN
Status: DISCONTINUED | OUTPATIENT
Start: 2024-01-01 | End: 2024-01-01 | Stop reason: HOSPADM

## 2024-01-01 RX ORDER — VALACYCLOVIR HYDROCHLORIDE 1 G/1
1000 TABLET, FILM COATED ORAL EVERY 12 HOURS SCHEDULED
Status: DISCONTINUED | OUTPATIENT
Start: 2024-01-01 | End: 2024-01-01 | Stop reason: HOSPADM

## 2024-01-01 RX ORDER — ACETAMINOPHEN 325 MG/1
650 TABLET ORAL EVERY 4 HOURS PRN
Status: DISCONTINUED | OUTPATIENT
Start: 2024-01-01 | End: 2024-01-01

## 2024-01-01 RX ORDER — ASPIRIN 81 MG/1
243 TABLET, CHEWABLE ORAL ONCE
Qty: 3 TABLET | Refills: 0 | Status: COMPLETED | OUTPATIENT
Start: 2024-01-01 | End: 2024-01-01

## 2024-01-01 RX ORDER — ZOLPIDEM TARTRATE 5 MG/1
5 TABLET ORAL
Status: DISCONTINUED | OUTPATIENT
Start: 2024-01-01 | End: 2024-01-01 | Stop reason: HOSPADM

## 2024-01-01 RX ORDER — ONDANSETRON 4 MG/1
4 TABLET, ORALLY DISINTEGRATING ORAL EVERY 6 HOURS PRN
Status: DISCONTINUED | OUTPATIENT
Start: 2024-01-01 | End: 2024-01-01 | Stop reason: HOSPADM

## 2024-01-01 RX ORDER — TRAZODONE HYDROCHLORIDE 100 MG/1
100 TABLET ORAL AT BEDTIME
Qty: 30 TABLET | Refills: 5 | Status: SHIPPED | OUTPATIENT
Start: 2024-01-01

## 2024-01-01 RX ORDER — LOSARTAN POTASSIUM 50 MG/1
25 TABLET ORAL DAILY
Qty: 90 TABLET | Refills: 3 | Status: SHIPPED | OUTPATIENT
Start: 2024-01-01

## 2024-01-01 RX ORDER — TRAZODONE HYDROCHLORIDE 100 MG/1
100 TABLET ORAL AT BEDTIME
Qty: 30 TABLET | Refills: 1 | Status: SHIPPED | OUTPATIENT
Start: 2024-01-01 | End: 2024-01-01

## 2024-01-01 RX ORDER — ATROPINE SULFATE 0.1 MG/ML
0.5 INJECTION INTRAVENOUS
Status: DISCONTINUED | OUTPATIENT
Start: 2024-01-01 | End: 2024-01-01 | Stop reason: HOSPADM

## 2024-01-01 RX ORDER — FENTANYL CITRATE 50 UG/ML
25 INJECTION, SOLUTION INTRAMUSCULAR; INTRAVENOUS
Status: DISCONTINUED | OUTPATIENT
Start: 2024-01-01 | End: 2024-01-01 | Stop reason: HOSPADM

## 2024-01-01 RX ORDER — POTASSIUM CHLORIDE 1500 MG/1
20 TABLET, EXTENDED RELEASE ORAL DAILY
Status: DISCONTINUED | OUTPATIENT
Start: 2024-01-01 | End: 2024-01-01

## 2024-01-01 RX ORDER — GABAPENTIN 300 MG/1
300 CAPSULE ORAL EVERY MORNING
Qty: 90 CAPSULE | Refills: 1 | Status: SHIPPED | OUTPATIENT
Start: 2024-01-01 | End: 2024-01-01

## 2024-01-01 RX ORDER — GABAPENTIN 300 MG/1
300 CAPSULE ORAL EVERY MORNING
Status: DISCONTINUED | OUTPATIENT
Start: 2024-01-01 | End: 2024-01-01 | Stop reason: HOSPADM

## 2024-01-01 RX ORDER — ACYCLOVIR 200 MG/1
0-1 CAPSULE ORAL
Status: DISCONTINUED | OUTPATIENT
Start: 2024-01-01 | End: 2024-01-01 | Stop reason: HOSPADM

## 2024-01-01 RX ORDER — GABAPENTIN 300 MG/1
300 CAPSULE ORAL 2 TIMES DAILY PRN
Qty: 180 CAPSULE | Refills: 1 | Status: SHIPPED | OUTPATIENT
Start: 2024-01-01

## 2024-01-01 RX ORDER — ASPIRIN 81 MG/1
324 TABLET, CHEWABLE ORAL ONCE
Status: DISCONTINUED | OUTPATIENT
Start: 2024-01-01 | End: 2024-01-01

## 2024-01-01 RX ORDER — LORAZEPAM 2 MG/ML
0.5 INJECTION INTRAMUSCULAR
Status: DISCONTINUED | OUTPATIENT
Start: 2024-01-01 | End: 2024-01-01 | Stop reason: HOSPADM

## 2024-01-01 RX ORDER — CAFFEINE CITRATE 20 MG/ML
60 SOLUTION INTRAVENOUS
Status: DISCONTINUED | OUTPATIENT
Start: 2024-01-01 | End: 2024-01-01 | Stop reason: HOSPADM

## 2024-01-01 RX ORDER — MULTIPLE VITAMINS W/ MINERALS TAB 9MG-400MCG
1 TAB ORAL DAILY
Status: DISCONTINUED | OUTPATIENT
Start: 2024-01-01 | End: 2024-01-01 | Stop reason: HOSPADM

## 2024-01-01 RX ORDER — FUROSEMIDE 20 MG
20 TABLET ORAL EVERY OTHER DAY
Qty: 45 TABLET | Refills: 0 | Status: SHIPPED | OUTPATIENT
Start: 2024-01-01

## 2024-01-01 RX ORDER — FENTANYL CITRATE 50 UG/ML
INJECTION, SOLUTION INTRAMUSCULAR; INTRAVENOUS
Status: DISCONTINUED | OUTPATIENT
Start: 2024-01-01 | End: 2024-01-01 | Stop reason: HOSPADM

## 2024-01-01 RX ORDER — ROSUVASTATIN CALCIUM 20 MG/1
20 TABLET, COATED ORAL DAILY
Status: DISCONTINUED | OUTPATIENT
Start: 2024-01-01 | End: 2024-01-01 | Stop reason: HOSPADM

## 2024-01-01 RX ORDER — POLYETHYLENE GLYCOL 3350 17 G/17G
17 POWDER, FOR SOLUTION ORAL 2 TIMES DAILY PRN
Status: DISCONTINUED | OUTPATIENT
Start: 2024-01-01 | End: 2024-01-01 | Stop reason: HOSPADM

## 2024-01-01 RX ORDER — OXYCODONE HYDROCHLORIDE 5 MG/1
10 TABLET ORAL EVERY 4 HOURS PRN
Status: DISCONTINUED | OUTPATIENT
Start: 2024-01-01 | End: 2024-01-01 | Stop reason: HOSPADM

## 2024-01-01 RX ORDER — ACETAMINOPHEN 650 MG/1
650 SUPPOSITORY RECTAL EVERY 4 HOURS PRN
Status: DISCONTINUED | OUTPATIENT
Start: 2024-01-01 | End: 2024-01-01 | Stop reason: HOSPADM

## 2024-01-01 RX ORDER — AMINOPHYLLINE 25 MG/ML
50-100 INJECTION, SOLUTION INTRAVENOUS
Status: DISCONTINUED | OUTPATIENT
Start: 2024-01-01 | End: 2024-01-01 | Stop reason: HOSPADM

## 2024-01-01 RX ADMIN — METOPROLOL TARTRATE 37.5 MG: 25 TABLET, FILM COATED ORAL at 10:21

## 2024-01-01 RX ADMIN — HEPARIN SODIUM 950 UNITS/HR: 10000 INJECTION, SOLUTION INTRAVENOUS at 19:48

## 2024-01-01 RX ADMIN — ASPIRIN 81 MG CHEWABLE TABLET 324 MG: 81 TABLET CHEWABLE at 19:47

## 2024-01-01 RX ADMIN — FOLIC ACID 1 MG: 1 TABLET ORAL at 10:09

## 2024-01-01 RX ADMIN — FOLIC ACID 1 MG: 1 TABLET ORAL at 09:05

## 2024-01-01 RX ADMIN — FOLIC ACID 1 MG: 1 TABLET ORAL at 10:20

## 2024-01-01 RX ADMIN — ROSUVASTATIN CALCIUM 20 MG: 20 TABLET, FILM COATED ORAL at 10:10

## 2024-01-01 RX ADMIN — METOPROLOL TARTRATE 37.5 MG: 25 TABLET, FILM COATED ORAL at 22:26

## 2024-01-01 RX ADMIN — GABAPENTIN 300 MG: 300 CAPSULE ORAL at 09:05

## 2024-01-01 RX ADMIN — CITALOPRAM HYDROBROMIDE 20 MG: 20 TABLET ORAL at 09:04

## 2024-01-01 RX ADMIN — HEPARIN SODIUM 600 UNITS/HR: 10000 INJECTION, SOLUTION INTRAVENOUS at 19:30

## 2024-01-01 RX ADMIN — SODIUM CHLORIDE: 9 INJECTION, SOLUTION INTRAVENOUS at 09:58

## 2024-01-01 RX ADMIN — ASPIRIN 325 MG ORAL TABLET 325 MG: 325 PILL ORAL at 09:04

## 2024-01-01 RX ADMIN — TETROFOSMIN 9.39 MILLICURIE: 1.38 INJECTION, POWDER, LYOPHILIZED, FOR SOLUTION INTRAVENOUS at 15:16

## 2024-01-01 RX ADMIN — TRAZODONE HYDROCHLORIDE 100 MG: 50 TABLET ORAL at 21:45

## 2024-01-01 RX ADMIN — METOPROLOL TARTRATE 37.5 MG: 25 TABLET, FILM COATED ORAL at 20:10

## 2024-01-01 RX ADMIN — CITALOPRAM HYDROBROMIDE 20 MG: 20 TABLET ORAL at 10:09

## 2024-01-01 RX ADMIN — GABAPENTIN 600 MG: 600 TABLET, FILM COATED ORAL at 21:45

## 2024-01-01 RX ADMIN — TRAZODONE HYDROCHLORIDE 100 MG: 50 TABLET ORAL at 22:26

## 2024-01-01 RX ADMIN — TETROFOSMIN 3.4 MILLICURIE: 1.38 INJECTION, POWDER, LYOPHILIZED, FOR SOLUTION INTRAVENOUS at 13:33

## 2024-01-01 RX ADMIN — GABAPENTIN 600 MG: 600 TABLET, FILM COATED ORAL at 22:26

## 2024-01-01 RX ADMIN — GABAPENTIN 300 MG: 300 CAPSULE ORAL at 10:21

## 2024-01-01 RX ADMIN — CITALOPRAM HYDROBROMIDE 20 MG: 20 TABLET ORAL at 10:21

## 2024-01-01 RX ADMIN — REGADENOSON 0.4 MG: 0.08 INJECTION, SOLUTION INTRAVENOUS at 15:13

## 2024-01-01 RX ADMIN — GABAPENTIN 300 MG: 300 CAPSULE ORAL at 10:10

## 2024-01-01 RX ADMIN — HEPARIN SODIUM 600 UNITS/HR: 10000 INJECTION, SOLUTION INTRAVENOUS at 20:06

## 2024-01-01 RX ADMIN — METOPROLOL TARTRATE 37.5 MG: 25 TABLET, FILM COATED ORAL at 09:04

## 2024-01-01 RX ADMIN — ASPIRIN 81 MG CHEWABLE TABLET 81 MG: 81 TABLET CHEWABLE at 10:09

## 2024-01-01 RX ADMIN — LORAZEPAM 2 MG: 1 TABLET ORAL at 13:27

## 2024-01-01 RX ADMIN — FUROSEMIDE 20 MG: 20 TABLET ORAL at 10:10

## 2024-01-01 RX ADMIN — ASPIRIN 81 MG CHEWABLE TABLET 81 MG: 81 TABLET CHEWABLE at 10:21

## 2024-01-01 RX ADMIN — ZOLPIDEM TARTRATE 5 MG: 5 TABLET ORAL at 22:26

## 2024-01-01 RX ADMIN — HUMAN ALBUMIN MICROSPHERES AND PERFLUTREN 9 ML: 10; .22 INJECTION, SOLUTION INTRAVENOUS at 09:13

## 2024-01-01 RX ADMIN — ZOLPIDEM TARTRATE 5 MG: 5 TABLET ORAL at 21:45

## 2024-01-01 RX ADMIN — ROSUVASTATIN CALCIUM 20 MG: 20 TABLET, FILM COATED ORAL at 10:21

## 2024-01-01 RX ADMIN — ROSUVASTATIN CALCIUM 20 MG: 20 TABLET, FILM COATED ORAL at 09:05

## 2024-01-01 RX ADMIN — METOPROLOL TARTRATE 37.5 MG: 25 TABLET, FILM COATED ORAL at 10:10

## 2024-01-01 ASSESSMENT — ACTIVITIES OF DAILY LIVING (ADL)
ADLS_ACUITY_SCORE: 27
ADLS_ACUITY_SCORE: 25
ADLS_ACUITY_SCORE: 25
ADLS_ACUITY_SCORE: 26
ADLS_ACUITY_SCORE: 26
ADLS_ACUITY_SCORE: 38
ADLS_ACUITY_SCORE: 23
ADLS_ACUITY_SCORE: 24
DEPENDENT_IADLS:: INDEPENDENT
ADLS_ACUITY_SCORE: 25
ADLS_ACUITY_SCORE: 24
ADLS_ACUITY_SCORE: 26
ADLS_ACUITY_SCORE: 23
ADLS_ACUITY_SCORE: 27
ADLS_ACUITY_SCORE: 27
ADLS_ACUITY_SCORE: 24
ADLS_ACUITY_SCORE: 24
ADLS_ACUITY_SCORE: 26
ADLS_ACUITY_SCORE: 24
ADLS_ACUITY_SCORE: 26
ADLS_ACUITY_SCORE: 38
ADLS_ACUITY_SCORE: 38
ADLS_ACUITY_SCORE: 26
ADLS_ACUITY_SCORE: 23
ADLS_ACUITY_SCORE: 25
ADLS_ACUITY_SCORE: 38
ADLS_ACUITY_SCORE: 24
ADLS_ACUITY_SCORE: 26
ADLS_ACUITY_SCORE: 27
ADLS_ACUITY_SCORE: 24
DEPENDENT_IADLS:: INDEPENDENT
ADLS_ACUITY_SCORE: 26
ADLS_ACUITY_SCORE: 23
ADLS_ACUITY_SCORE: 24
ADLS_ACUITY_SCORE: 24
ADLS_ACUITY_SCORE: 25
ADLS_ACUITY_SCORE: 27
ADLS_ACUITY_SCORE: 24
ADLS_ACUITY_SCORE: 24
ADLS_ACUITY_SCORE: 25
ADLS_ACUITY_SCORE: 24
ADLS_ACUITY_SCORE: 26
ADLS_ACUITY_SCORE: 24
ADLS_ACUITY_SCORE: 24
ADLS_ACUITY_SCORE: 38
ADLS_ACUITY_SCORE: 24
ADLS_ACUITY_SCORE: 24
ADLS_ACUITY_SCORE: 26
ADLS_ACUITY_SCORE: 26
ADLS_ACUITY_SCORE: 23
DEPENDENT_IADLS:: INDEPENDENT
ADLS_ACUITY_SCORE: 25
ADLS_ACUITY_SCORE: 38
ADLS_ACUITY_SCORE: 24
ADLS_ACUITY_SCORE: 26
ADLS_ACUITY_SCORE: 24
ADLS_ACUITY_SCORE: 24
ADLS_ACUITY_SCORE: 27
ADLS_ACUITY_SCORE: 26
ADLS_ACUITY_SCORE: 26
ADLS_ACUITY_SCORE: 24
DEPENDENT_IADLS:: INDEPENDENT
ADLS_ACUITY_SCORE: 24
ADLS_ACUITY_SCORE: 26
ADLS_ACUITY_SCORE: 24
ADLS_ACUITY_SCORE: 26
ADLS_ACUITY_SCORE: 38

## 2024-01-01 ASSESSMENT — ASTHMA QUESTIONNAIRES: ACT_TOTALSCORE: 25

## 2024-01-01 ASSESSMENT — COLUMBIA-SUICIDE SEVERITY RATING SCALE - C-SSRS
6. HAVE YOU EVER DONE ANYTHING, STARTED TO DO ANYTHING, OR PREPARED TO DO ANYTHING TO END YOUR LIFE?: NO
2. HAVE YOU ACTUALLY HAD ANY THOUGHTS OF KILLING YOURSELF IN THE PAST MONTH?: NO
1. IN THE PAST MONTH, HAVE YOU WISHED YOU WERE DEAD OR WISHED YOU COULD GO TO SLEEP AND NOT WAKE UP?: NO

## 2024-01-01 ASSESSMENT — PATIENT HEALTH QUESTIONNAIRE - PHQ9
SUM OF ALL RESPONSES TO PHQ QUESTIONS 1-9: 3
10. IF YOU CHECKED OFF ANY PROBLEMS, HOW DIFFICULT HAVE THESE PROBLEMS MADE IT FOR YOU TO DO YOUR WORK, TAKE CARE OF THINGS AT HOME, OR GET ALONG WITH OTHER PEOPLE: SOMEWHAT DIFFICULT
SUM OF ALL RESPONSES TO PHQ QUESTIONS 1-9: 3

## 2024-01-01 ASSESSMENT — PAIN SCALES - GENERAL
PAINLEVEL: MODERATE PAIN (5)
PAINLEVEL: NO PAIN (0)
PAINLEVEL: MILD PAIN (3)
PAINLEVEL: MODERATE PAIN (4)

## 2024-01-08 NOTE — TELEPHONE ENCOUNTER
Current complaint:      1) hypotension    79/45  79/44  77/42  77/44  77/45  77/40  102/53  2) more recent ankle swelling.    Notes she has been falling asleep watching television which is unusual for her.  That is what prompted her to check her blood pressure.  She denies being lightheaded or dizzy.    Confirmed patient is taking:  Losartan 50 mg daily  Metoprolol 50 mg BID      LOV 12/19/2023  (I10) Essential hypertension  Comment: Was not at goal on 11/2/12023 and was having ankle swelling on amlodipine so we stopped that and started metoprolol 50 mg PO BID and losartan 50 mg daily. BP is 130/83, HR is 70.   Plan:Continue losartan (COZAAR) 50 MG daily without change, increase metoprolol         tartrate (LOPRESSOR) from 25 to 50 MG BID.        RTC 2/2024 for BP check       Follow up appointment scheduled 2/28/24.

## 2024-01-08 NOTE — TELEPHONE ENCOUNTER
Relayed instructions to patient.  Scheduled for visit on Thursday.  Patient understands that she should go to the emergency room for dizziness or syncope.    Future Appointments   Date Time Provider Department Center   1/11/2024  2:40 PM Juan Antonio Montelongo MD [facility]   2/14/2024  1:00 PM EC LAB ECLABR [facility]   2/28/2024 12:30 PM VUS1 [facility]   2/28/2024  1:30 PM Juan Antonio Montelongo MD [facility]

## 2024-01-08 NOTE — TELEPHONE ENCOUNTER
[facility]    Who is the name of the provider?:  SAVANNAH ANDRES   What is the location you see this provider at/preferred location?: [place]  Person calling / Facility: Kavitha Headley  Phone number:  [phone] (home)  Nurse call back needed:  YES     Reason for call:  Patient describes the following recent BP readings:    79/45  79/44  77/42    Also reports more recent ankle swelling.    She is asking for a callback from a nurse.    Pharmacy location:     [facility]ACY #[id number] - [place] - [address] [facility]. AT [address] HOME DELIVERY - [facility], [place] - [address] [place] [address] PHARMACY [facility] - [place] - [address] PHARMAC[facility] - [place], [place] - [address] [facility]  Outside Imaging: n/a   Can we leave a detailed message on this number?  YES     1/8/2024, 2:55 PM

## 2024-01-08 NOTE — TELEPHONE ENCOUNTER
Dial metoprolol back to 25 mg PO BID. No change to losartan. Go to ED if passing out. Check BP at home daily after decreasing metoprolol as above. Please see me at 2:40 PM this Tursday to be evaluated.Keep 2/28/24 appointment on the books for now.

## 2024-01-10 NOTE — TELEPHONE ENCOUNTER
Please have her reach out to sleep medicine to manage medications for sleep.    Thanks  Jin Ackerman MD

## 2024-01-11 NOTE — PROGRESS NOTES
"Patient is here to discuss FOLLOW-UP    BP (!) 185/94 (BP Location: Right arm, Patient Position: Chair, Cuff Size: Adult Regular)   Pulse 88   Ht 5' 4\" (1.626 m)   Wt 185 lb 6.4 oz (84.1 kg)   LMP  (LMP Unknown)   SpO2 96%   BMI 31.82 kg/m      Questions patient would like addressed today are: N/A.    Refills are needed: No    Has homecare services and agency name:  [facility]RAHAM    "

## 2024-01-11 NOTE — PROGRESS NOTES
HPI: Kavitha Headley is an 78 year old female with a h/o paroxysmal SVT and PACs, hypertension, hyperlipidemia, obesity s/p gastric bypass surgery, chronic diastolic heart failure, substance use disorder, and severe aortic stenosis s/p TAVR with 23 mm Payne madyson 3 valve (8/9/2022), who presents today for evaluation of her known and stable 41 mm ATAA, which has not grown since 2019. She has no known AAA, or iliac aneurysms. Her BP on 11/21/2023 was 153/88 on amlodipine 5 mg daily. She was not at that time on a beta blocker or on an ACE/ARB due to unclear reasons. She was switched to metoprolol and losartan on 11/21/2023. Her LDL was 133 on no lipid lowering meds. She was placed on Crestor 40 mg daily on 11/21/2023     Review Of Systems  Skin: negative  Eyes: negative  Ears/Nose/Throat: negative  Respiratory: positive for lifestyle limiting shortness of breath with exertion; no weight gain or cough, or hemoptysis  Cardiovascular: negative  Gastrointestinal: negative  Genitourinary: negative  Musculoskeletal: ankle swelling on amlodipine 10 mg daily  Neurologic: negative  Psychiatric: negative  Hematologic/Lymphatic/Immunologic: negative  Endocrine: negative        PAST MEDICAL HISTORY:                  Past Medical History           Past Medical History:   Diagnosis Date    Alcohol abuse      Anxiety disorder      Ascending aorta dilatation (H24)      Bariatric surgery status 1996?     gastric bypass, [facility] and    Benign hypertension      Chronic insomnia      Chronic pain syndrome       Chronic back and neck pain, chronic pain due to osteoarthritis multiple joints    Coronary artery disease involving native coronary artery of native heart without angina pectoris 10/16/2018     Minimal coronary artery disease on coronary angiogram in 2015.     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    Major depressive disorder, single episode, severe, without mention of psychotic behavior      Mixed hyperlipidemia      Pelvic relaxation disorder       Surgical intervention for cystocele/rectocele 3,11/2012    Personal history of urinary calculi 06/2006     left ureteral stone,lithotripsy    Psoriasis      Spinal stenosis      Stage III chronic kidney disease (H) 2005            PAST SURGICAL HISTORY:                  Past Surgical History             Past Surgical History:   Procedure Laterality Date    APPENDECTOMY   3/2004     incidental    ARTHRODESIS TOE(S) Right 1/31/2020     Procedure: RIGHT FIRST METATARSAL PHALANGEAL JOINT ARTHRODESIS;  Surgeon: Steven Reyes MD;  Location:  OR    C MEDIASTINOSCOPY W OR WO BIOPSY   2/2008     Videomediastinoscopy and, for mediastinal adenopathy -reactive lymphoid hyperplasia    CARPAL TUNNEL RELEASE RT/LT   10/2010     Carpometacarpal excisional arthroplasty with a fascial autograft and APL suspension sling (55543). 2. Left thumb metacarpophalangeal joint fusion with autologous bone graft ([id number]). 3. Left endoscopic carpal tunnel release     CHOLECYSTECTOMY, LAPOROSCOPIC   11/2010     Cholecystectomy, Laparoscopic    COLONOSCOPY N/A 9/8/2016     Procedure: COMBINED COLONOSCOPY, SINGLE OR MULTIPLE BIOPSY/POLYPECTOMY BY BIOPSY;  Surgeon: Moe Barlow MD;  Location:  GI    CV CORONARY ANGIOGRAM N/A 6/20/2022     Procedure: Coronary Angiogram;  Surgeon: Nora Parker MD;  Location:  HEART CARDIAC CATH LAB    CV PCI N/A 6/20/2022     Procedure: Percutaneous Coronary Intervention;  Surgeon: Nora Parker MD;  Location:  HEART CARDIAC CATH LAB    CV RIGHT HEART CATH MEASUREMENTS RECORDED N/A 6/20/2022     Procedure: Right Heart Catheterization;  Surgeon: Nora Parker MD;  Location:  HEART CARDIAC CATH  LAB    CV TRANSCATHETER AORTIC VALVE REPLACEMENT-FEMORAL APPROACH N/A 8/9/2022     Procedure: Transcatheter Aortic Valve Replacement-Femoral Approach;  Surgeon: Nora Parker MD;  Location:  HEART CARDIAC CATH LAB    CYSTOCELE REPAIR   11/2012     davinci laparoscopic sacrocolpopexy, enterocele repair, lysis of adhesions, placement of retropubic mid urethral sling, cystoscopy    CYSTOSCOPY, LITHOTRIPSY, COMBINED   6/2006     Left extracorporeal shock wave lithotripsy, cystoscopy, left ureteral stent placement.    CYSTOSCOPY, REMOVE STENT(S), COMBINED   7/2006     Cystoscopy, removal of left ureteral stent, retrograde pyelography, flexible and rigid ureteroscopy and holmium laser lithotripsy, basket removal of stone fragments, ureteral stent placement.     ENDOSCOPIC ULTRASOUND UPPER GASTROINTESTINAL TRACT (GI) N/A 6/12/2017     Procedure: ENDOSCOPIC ULTRASOUND, ESOPHAGOSCOPY / UPPER GASTROINTESTINAL TRACT (GI);  ENDOSCOPIC ULTRASOUND, ESOPHAGOSCOPY / UPPER GASTROINTESTINAL TRACT (GI);  Surgeon: Parth Graham MD;  Location:  GI    HERNIA REPAIR   4/2012     bilateral augmentation mastopexy, ventral hernia repair, and medial thigh liposuction on 04/06/2012.     HYSTERECTOMY VAGINAL, BILATERAL SALPINGO-OOPHERECTOMY, COMBINED   1998     due to myoma and bleeding    JOINT REPLACEMENT, HIP RT/LT   4/2004     right total hip arthroplasty    LAPAROTOMY, LYSIS ADHESIONS, COMBINED   3/2004     lysis adhesions, ventral hernia repair, appendectomy incidentally    LYMPH NODE BIOPSY   4/2008     right axillary, reactive follicular and paracortical hyperplasia.    MAMMOPLASTY AUGMENTATION BILATERAL   4/2012    REPAIR HAMMER TOE Right 1/31/2020     Procedure: WITH SECOND AND THIRD CLAW TOE RECONSTRUCTION;  Surgeon: Steven Reyes MD;  Location:  OR    REVISE RECONSTRUCTED BREAST   6/7/2012     Left breast capsulotomy.     ZZC GASTRIC BYPASS,OBESE<100CM ARIANNA-EN-Y   1996    Z REPAIR OF RECTOCELE   3/2012     ZZC TOTAL KNEE ARTHROPLASTY   12/2005     left             CURRENT MEDICATIONS:                  Current Outpatient Prescriptions               Current Outpatient Medications   Medication Sig Dispense Refill    acamprosate (CAMPRAL) 333 MG EC tablet Take 2 tablets (666 mg) by mouth 3 times daily Start with 1 tablet three times daily, increase to 2 tablets after 2 weeks if not finding improvement 90 tablet 1    acetaminophen (TYLENOL) 500 MG tablet Take 1,000 mg by mouth 2 times daily as needed for pain        amLODIPine (NORVASC) 10 MG tablet Take 1 tablet (10 mg) by mouth daily 90 tablet 3    aspirin (ASA) 81 MG EC tablet Take 81 mg by mouth daily        BETA BLOCKER NOT PRESCRIBED (INTENTIONAL) Please choose reason not prescribed from choices below.        bisacodyl (DULCOLAX) 5 MG EC tablet Take 5 mg by mouth daily as needed for constipation        buprenorphine (BUTRANS) 5 MCG/HR WK patch Place 1 patch onto the skin once a week        [START ON 11/22/2023] citalopram (CELEXA) 20 MG tablet Take 1 tablet (20 mg) by mouth daily 30 tablet 1    doxepin (SINEQUAN) 10 MG capsule Take 2 capsules (20 mg) by mouth at bedtime 60 capsule 0    folic acid (FOLVITE) 1 MG tablet Take 1 mg by mouth daily        gabapentin (NEURONTIN) 600 MG tablet Take 1 tablet (600 mg) by mouth at bedtime 30 tablet 0    MISC NATURAL PRODUCTS PO Take 1 tablet by mouth daily *L-Tryptophan 1000 mg*        Multiple Vitamins-Minerals (CENTRUM SILVER) per tablet Take 1 tablet by mouth daily        polyethylene glycol (MIRALAX) 17 GM/Dose powder Take 17 g by mouth 2 times daily as needed for constipation        tiZANidine (ZANAFLEX) 4 MG capsule Take 4 mg by mouth 3 times daily as needed for muscle spasms        traZODone (DESYREL) 100 MG tablet TAKE 1 TABLET BY MOUTH EVERYDAY AT BEDTIME 30 tablet 1    valACYclovir (VALTREX) 1000 mg tablet TAKE 2 TABLETS (2,000 MG) BY MOUTH AS NEEDED (ONSET OF COLD SORE) 4 tablet 0    zolpidem ER (AMBIEN CR) 6.25 MG  CR tablet Take 6.25 mg by mouth At Bedtime                ALLERGIES:                            Allergies   Allergen Reactions    Bactrim [Sulfamethoxazole-Trimethoprim] Hives    Morphine Itching       NAUSEA    Morphine And Related Itching       NAUSEA         SOCIAL HISTORY:                  Social History   Social History                Socioeconomic History    Marital status:        Spouse name: Mt    Number of children: 4    Years of education: 18    Highest education level: Not on file   Occupational History    Occupation: nurse       Employer: [employer]       Employer: RETIRED   Tobacco Use    Smoking status: Never    Smokeless tobacco: Never   Substance and Sexual Activity    Alcohol use: Not Currently       Alcohol/week: 63.0 standard drinks of alcohol       Types: 63 Standard drinks or equivalent per week    Drug use: No    Sexual activity: Yes       Partners: Male   Other Topics Concern     Service Not Asked    Blood Transfusions No    Caffeine Concern Yes       Comment: 1-2 cups per day     Occupational Exposure Yes       Comment: blood    Hobby Hazards No    Sleep Concern Yes    Stress Concern Yes    Weight Concern Yes       Comment: gastric  byepass    Special Diet No    Back Care No    Exercise Yes       Comment: walk, swin    Bike Helmet No    Seat Belt Yes    Self-Exams Yes    Parent/sibling w/ CABG, MI or angioplasty before 65F 55M? Not Asked   Social History Narrative    Not on file      Social Determinants of Health              Financial Resource Strain: Low Risk  (10/25/2023)     Financial Resource Strain      Within the past 12 months, have you or your family members you live with been unable to get utilities (heat, electricity) when it was really needed?: No   Food Insecurity: Low Risk  (10/25/2023)     Food Insecurity      Within the past 12 months, did you worry that your food would run out before you got money to buy more?: No      Within the past 12 months, did the food you  bought just not last and you didn t have money to get more?: No   Transportation Needs: Low Risk  (10/25/2023)     Transportation Needs      Within the past 12 months, has lack of transportation kept you from medical appointments, getting your medicines, non-medical meetings or appointments, work, or from getting things that you need?: No   Physical Activity: Not on file   Stress: Not on file   Social Connections: Not on file   Interpersonal Safety: Not on file   Housing Stability: Low Risk  (10/25/2023)     Housing Stability      Do you have housing? : Yes      Are you worried about losing your housing?: No            FAMILY HISTORY:                   Family History             Family History   Problem Relation Age of Onset    Substance Abuse Father      Cancer Father           throat and lung mets    Diabetes No family hx of      Coronary Artery Disease No family hx of      Cerebrovascular Disease No family hx of                 Physical exam Reveals:     O/P: WNL  HEENT: WNL  NECK: No JVD, thyromegaly, or lymphadenopathy; positive left carotid bruit  HEART: RRR, no murmurs, gallops, or rubs  LUNGS: CTA bilaterally without rales, wheezes, or rhonchi  GI: NABS, nondistended, nontender, soft  EXT:without cyanosis, clubbing, or edema  NEURO: nonfocal  : no flank tenderness        Rt femoral:      3 plus palpable    Rt popliteal:     4 plus palpable         Lt femoral:       3 plus palpable   Lt popliteal:      4 plus palpable                  EXAM: CT CHEST PULMONARY EMBOLISM W CONTRAST  LOCATION: [facility]  DATE: 11/6/2023     INDICATION: Short of breath, worsening, elevated D-dimer  COMPARISON: CT 12/17/2021  TECHNIQUE: CT chest pulmonary angiogram during arterial phase injection of IV contrast. Multiplanar reformats and MIP reconstructions were performed. Dose reduction techniques were used.   CONTRAST: 68 mL Isovue 370     FINDINGS:  ANGIOGRAM CHEST: The main pulmonary artery is  enlarged measuring up to 3.9 cm in greatest radial dimension which can be associated with changes of pulmonary arterial hypertension. There is no evidence for pulmonary emboli. Thoracic aorta is negative for   dissection, but there is 4.1 cm ascending aortic aneurysmal dilatation seen. There is a TAVER.  Mild reflux of contrast into the IVC which can be associated with mild increased right heart pressure/strain.     LUNGS AND PLEURA: There is a benign calcified granuloma in the right upper lobe.     MEDIASTINUM/AXILLAE: Benign calcified right hilar lymph nodes which should be associated with the benign calcified granuloma in the right upper lobe.     CORONARY ARTERY CALCIFICATION: Mild.     UPPER ABDOMEN: Prior postoperative changes of the GI tract with no complications identified. Mild atrophy of the left kidney.     MUSCULOSKELETAL: Bilateral breast implants with no evidence for rupture. Moderate to advanced multilevel degenerative changes, most marked in the lower thoracic spine.                                                                      IMPRESSION:  1.  No PE.     2.  4.1 cm ascending thoracic aortic aneurysm with no associated dissection.     3.  Enlargement of the central main pulmonary artery likely sequelae of pulmonary arterial hypertension.     4.  Since 12/17/2021, the TAVER is new, otherwise there have been no significant changes.           Procedure: CT ANGIOGRAM TAVR   Examination Date: 6/28/2022 4:41 PM   Indication: Severe aortic stenosis, Pre TAVR  Ordering Provider: CRISTI MOTT     TECHNIQUE: ECG gated CT of the heart, aorta and nongated CT of the  chest abdomen pelvis without and with IV contrast Isovue 115 mL was  performed per the HIEU protocol on a Siemens Dual Source Flash scanner  without incident. A noncontrast CT of the chest abdomen and pelvis was  performed followed by contrast-enhanced CTA of the heart in a spiral  gated mode with limited field-of-view followed by gated  high pitch  spiral CTA of the chest, abdomen, pelvis at 100/120 kVP to evaluate  the thoracoabdominal aorta and iliac vasculature. Scan protocol was  optimized to minimize radiation exposure. The total radiation exposure  was 960 DLP and 13.44 mSv. Images were reconstructed and analyzed on a  [employer] Workstation.                                                                       IMPRESSION:  1.  No acute aortic pathology like dissection, intramural hematoma or  contained rupture noted.  2.  Please review detailed radiology report, to follow separately, for  incidental non-cardiovascular findings.     FINDINGS:     1.  Cuspid aortic valve. Aortic valve calcium score is 736. The  ascending aorta is mild calcified, aortic arch is  mild calcified, the  descending thoracic aorta is mild calcified.   2.  The arch vessel branching pattern is normal.   3.  The suprarenal abdominal aorta is mild calcified; infrarenal  abdominal aorta is mild calcified  4.  Widely patent origins of celiac trunk, superior mesenteric artery  and inferior mesenteric artery.  Single bilateral renal arteries with  widely patent origins.   5.  There is no acute aortic pathology, such as dissection, intramural  hematoma, or contained rupture.      MEASUREMENTS:   Representative dimensions of the thoracoabdominal aorta are as  follows:     1. AORTIC ANNULUS MEASUREMENTS:     *  The average aortic annulus diameter is 21.8 mm,   *  Aortic annulus area is 3.75 cm2  *  Aortic annulus perimeter is 72.2 mm  *  The 3 cusp coaxial angle for aortic valve is (NORY 23 , CRA 6) these   angles will vary depending upon the patient's body position  *  Left main - Annulus distance: 10.8 mm, RCA - Annulus distance: 15.1  mm     2. LVOT MEASUREMENTS:     *  The average LVOT diameter is 20.8 mm  *  LVOT area is 3.42 cm2  *  LVOT perimeter is 68.7 mm  *  LVOT calcification none mm     3. AORTA MEASUREMENTS     1.  The aortic root at the sinuses of Valsalva (L/R/N)  31.4 mm x  27.6  mm 29.4 mm  2.  The sinotubular junction:  27 mm x 26.3 mm  3.  Sinotubular junction height: 18.4 mm x 18.9 mm  4.  Proximal ascending aorta: 42.2 mm x 39.9 mm  5.  Distal abdominal aorta proximal to the bifurcation: 15.3 mm x 14.2  mm     4. ILIOFEMORAL MEASUREMENTS:     RIGHT:  1.  Right common iliac artery: 9.09 mm x 9.96 mm   2.  Right external iliac artery: 6.14 mm x 7.65 mm   3.  Right common femoral artery: 5.36 mm x 7.83 mm   4.  Tortuosity: mild   5.  Calcification: mild      LEFT:  1.  Left common iliac artery: 7.32 mm x  9.06 mm  2.  Left external iliac artery: 6.17 mm x 7.45 mm  3.  Left common femoral artery: 5.71 mm x 11.48 mm  4.  Tortuosity: mild   5.  Calcification: mild      7. Aortic Root Angle 47.3 degrees     8. Innominate Artery :  14.2 mm X 13mm     9. Left Carotid Artery:  8.12mm X 7.4 mm     OTHER FINDINGS:   1.  Large epicardial fat-pad cannot rule out effusion.  2.  Please review radiology review for incidental non-cardiovascular  findings including lungs, abdominal organs, bone, soft tissue, nodes  to follow separately     JOSSELINE DE LOS SANTOS MD            CTA CHEST WITH CONTRAST   7/12/2019 1:00 PM      HISTORY: Aortic disease, nontraumatic, known or suspected; aortic  valve disease; other nonrheumatic aortic valve disorders.     TECHNIQUE:  CT of the chest was performed following the administration  of 90 mL Isovue-370. Radiation dose for this scan was reduced using  automated exposure control, adjustment of the mA and/or kV according  to patient size, or iterative reconstruction technique.     COMPARISON: CT chest dated 9/10/2010.     FINDINGS:    Vascular examination:  The thoracic aorta at the level of the sinuses  of Valsalva has a maximum diameter of approximately 3.4 cm. The  maximum diameter of the ascending thoracic aorta is approximately 4.1  cm. The diameter at the level of the mid arch is approximately 2.3 cm.  The diameters of the proximal, mid, and distal  descending thoracic  aorta are 2.3, 1.9, and 1.9 cm respectively.     The thoracic aortic takeoff vessels are patent without significant  stenoses.     The celiac trunk, superior mesenteric artery, and renal arteries are  patent. Calcified plaque obscures the origin of the left renal artery.     Soft tissues: There are multiple small mediastinal lymph nodes. There  is a calcified granuloma in the right upper lobe. There is atelectasis  in the left lower lobe just posterior to the left major fissure. There  are postsurgical changes involving the stomach. Patient is status post  a cholecystectomy.                                                                      IMPRESSION: Mild dilatation of the ascending thoracic aorta which has  a maximum diameter of approximately 4.1 cm.     CONOR VANG MD        Component      Latest Ref Rng 9/25/2023  2:42 PM   Cholesterol      <200 mg/dL 259 (H)    Triglycerides      <150 mg/dL 140    HDL Cholesterol      >=50 mg/dL 98    LDL Cholesterol Calculated      <=100 mg/dL 133 (H)    Non HDL Cholesterol      <130 mg/dL 161 (H)       Legend:  (H) High        US CAROTID BILATERAL   12/6/2023 2:54 PM     CLINICAL HISTORY: left carotid bruit. Please comment upon LICA  stenosis if present.; Left carotid bruit  TECHNIQUE: Duplex exam performed utilizing 2D gray-scale imaging,  Doppler interrogation with color-flow and spectral waveform analysis.     COMPARISON: None.     FINDINGS:  RIGHT: There is mild atheromatous plaque. Normal waveforms with no  significant stenosis.     LEFT: There is mild atheromatous plaque. Tortuous left internal  carotid artery with normal waveforms and no significant stenosis.     Both vertebral arteries and subclavian artery waveforms are normal.     VELOCITY CHART:  The following velocities were obtained in the RIGHT carotid system.  Prox CCA:          79/23 cm/s  Mid/distal CCA: 81/24 cm/s  Prox ICA:            68/19 cm/s  Mid ICA:              80/28  cm/s  Distal ICA:          83/35 cm/s  ECA:                   81/7 cm/s  Vertebral:            42/10 cm/s     ICA/CCA: PS 0.98     The following velocities were obtained in the LEFT carotid system.     Prox CCA:          95/20 cm/s  Mid/distal CCA: 84/25 cm/s  Prox ICA:            73/24 cm/s  Mid ICA:              100/35 cm/s  Distal ICA:          111/41 cm/s  ECA:                   29/80 cm/s  Vertebral:            75/29 cm/s     ICA/CCA: PS 1.3                                                                         IMPRESSION:  1. Mild atheromatous plaque in the carotid arteries.  2. No significant carotid stenosis identified.     Evaluation based on velocities and NASCET criteria.     RODRIGO OBREGON MD         CTA ABDOMEN AND PELVIS BILATERAL LEG RUNOFF WITH CONTRAST   12/6/2023  2:44 PM      HISTORY: Known ATAA, evaluate for AAA, mesenteric aneurysms, LE iliac,  femoral, popliteal aneurysms, prominent popliteal pulses bilaterally;  Aneurysm of ascending aorta without rupture (H24).     TECHNIQUE: CTA of the abdomen and pelvis with runoff to the toes with  100 mL Isovue-370 IV. Radiation dose for this scan was reduced using  automated exposure control, adjustment of the mA and/or kV according  to patient size, or iterative reconstruction technique. 3-D images  were created at an independent workstation under concurrent  supervision at the request of the ordering provider.     COMPARISON: CTA abdomen and pelvis 6/28/2022.     FINDINGS:   LUNG BASES: Lung bases are clear. No pleural effusion or pericardial  effusion. Changes of bilateral breast augmentation. Changes of TAVR.     VASCULATURE: Atherosclerotic disease is noted throughout the abdominal  aorta without evidence of aneurysm, dissection or stenosis. The celiac  access, superior mesenteric artery, and inferior mesenteric arteries  are all patent. Bilateral renal arteries are patent.     The bilateral common iliac, internal iliac, and external  iliac  arteries are patent.     RIGHT LOWER EXTREMITY: Right common femoral, profunda femoral,  superficial femoral and visualized portions of the popliteal artery  are patent. Evaluation of the popliteal artery is significantly  limited secondary to beam hardening artifact from knee arthroplasty.  Three-vessel runoff.     LEFT LOWER EXTREMITY: Left common femoral, profunda femoral,  superficial femoral and visualized portion of the popliteal artery are  patent. Three-vessel runoff. Evaluation of the popliteal artery is  significantly limited secondary to beam hardening artifact from knee  arthroplasty.     ABDOMEN: Evaluation of solid organ parenchyma is limited secondary to  contrast bolus timing. The arterial enhanced appearance of the spleen,  adrenal glands, kidneys, liver, and pancreas show no focal  abnormality. Changes of cholecystectomy. Changes of gastric bypass. No  intrahepatic or extrahepatic biliary dilatation. No intraperitoneal  free air or free fluid.     PELVIS: No dilated loops of small or large bowel. Diverticulosis  without evidence of diverticulitis. Prominent groin lymph nodes with  normal fatty leilani are noted. No abdominal or pelvic lymphadenopathy.  Evaluation of the pelvis is somewhat limited secondary to beam  hardening artifact from right hip arthroplasty. Limited evaluation of  the bladder shows no focal abnormality.     MUSCULOSKELETAL: Changes of right hip arthroplasty and bilateral knee  arthroplasty. No suspicious bony lesions.                                                                      IMPRESSION:  1. Patent abdominal aorta and visceral vessels.  2. Patent pelvic inflow vessels.  3. Patent arteries throughout the bilateral lower extremities without  evidence of stenosis or occlusion. Evaluation of the popliteal  arteries are somewhat limited secondary to beam hardening artifact  from bilateral knee arthroplasty.  4. Changes of cholecystectomy.  5. Changes of gastric  bypass.  6. Diverticulosis without evidence of diverticulitis.     FELISHA MEHTA, DO                     A/P:        (I71.21) Aneurysm of ascending aorta without rupture (H24)  (primary encounter diagnosis)  Comment: This is 41 mm in size and is not growing. Her SBP is elevated > 120/70  on amlodipine 10 mg daily. Her LDL-C is 131 on no meds. She does not currently require surgical intervention.   Plan: Annual surveillance of ATAA with CTA chest in 12/2024. Order at next visit. Consider TTE thereafter if no growth. Order at subsequent visits. Obtian better BP control and lipid control (see below) .     (R09.89) Abnormal peripheral pulse  Comment: increased popliteal pulses. CTA abd/pelvis with runoff revealed no PA aneurysms but evaluation of the popliteal  arteries was limited secondary to beam hardening artifact from bilateral knee arthroplasties.  Plan: Check popliteal duplex 02/2024 RTC 2 weeks later.           (I10) Essential hypertension  Comment: Was not at goal on 11/2/12023 and was having ankle swelling on amlodipine so we stopped that and started metoprolol 50 mg PO BID and losartan 50 mg daily. BP was 130/83, HR was 70 on 12/23/2023. We continued losartan (COZAAR) 50 MG daily without change, and  increased metoprolol tartrate (LOPRESSOR) from 25 to 50 MG BID. She then became hypotensive and stopped metoprolol entirely. She  eventually resumed metoprolol 25 mg PO BID and BP is 140/90 at home.  Plan: Optimal BP control for ATAA may not be realistic to maintain in this 78 year old female . I would therefore opt for control that although > 120/70 is reasonably enough close to it that we can maintain QOL. Increase metoprolol to 37.5 mg PO BID. She can nt use HCTZ as she has hives with sulfa drugs.         RTC 2/2024 for BP check      (E78.5) Hyperlipidemia LDL goal <70  Comment: not at goal so we started rosuvastatin (CRESTOR) 20 MG tablet daily.  Plan: Check advanced lipid testing around 2/21/24. RTC two  weeks later     (R09.89) Left carotid bruit  Comment: Likely referred sound form prosthetic murmur. Carotid duplex revealed no significant ICA ds bilaterally.   Plan: US Carotid Bilateral             (G47.33) JENN (obstructive sleep apnea)  Comment: She has concerns regarding pulmonary htn since her PA size was increased on 11/06 CT PE protocol chest. She has been dxd with JENN and is not yet using a mouthguard or CPAP mask  Plan: Treat JENN. RTC 02/2024 to discuss.     (I27.20) Pulmonary hypertension (H)  Comment: As above  Plan: Treat JENN. See GRULLON below. That w/u is unrevealing. If wearing CPAP/mouthguard for three months does not improve sxs, refer to pulmonary htn clinic.     (R06.09) Dyspnea on exertion  Comment: Lifestyle limiting. Normal lung exam. Normal EF on TTE. Checked a six minute walk test on 12/05/2023. Results indicate She did not desaturate. She could only complete 72% of the test.. I suspect she is merely deconditioned.   Plan: F/U on GRULLON with PCP.                         35 minutes total medical care on today's date.

## 2024-01-11 NOTE — DISCHARGE INSTRUCTIONS
Detail Level: Zone TAVR Discharge Instructions  You have just had your aortic valve replaced with a new biological tissue valve. You should feel better after your surgery, but complete recovery may take several weeks. Please follow these instructions carefully and please call the [facility] ([phone]) with any questions or concerns.      AFTER YOU GO HOME:  Drink extra fluids for 2 days.  You may resume your normal diet.  Do not smoke.  Do not drink alcohol.  Relax and take it easy.  Do not make any important or legal decisions.    FOR 1 WEEK AFTER TAVR:  Do not drive or operate machines at home or at work. No driving until you return for your 1 week follow-up appointment. You and the provider will discuss this at the appointment.   Refrain from sexual intercourse for 1 week.  You may shower the day after your procedure. Do NOT take a bath, or use a hot tub or pool for at least 1 week. Do NOT scrub the site. Do not use lotion or powder near the puncture site.  Do not lift more than 10 pounds.   Do not do any heavy activity such as exercise, lifting, or straining.  No housework, yard work, or any activities that make you sweat.    CARE OF WRIST AND GROIN SITES:  For 2 days, when you cough, sneeze, laugh or move your bowels, hold your hand over the puncture site and press firmly on/above the site.  Remove the bandage after 24 hours. If there is minor oozing, apply another bandage and remove it after 12 hours.  It is normal to have bruising or pea size lump at the site.  If you have a wrist site puncture: Do not use your hand or arm to support your weight (such as rising from a chair)or bend your wrist (such as lifting a garage door) for 1 week.  No stooping or squatting for 1 week.     BLEEDING:  If you start bleeding from the site in your wrist:  Sit down and press firmly on/above the site with your fingers for 10 minutes.   Once bleeding stops, keep your arm still for 2 hours.   Call [facility] ([phone]) as soon as you can.  If you start bleeding from the site in your groin:  Lie down flat and press firmly on/above the site for 10 minutes.  Once bleeding stops lay flat for 2 hours.   Call [facility] ([phone]) as soon as you can.  Call 911 right away if you have heavy bleeding or bleeding that does not stop.    MEDICINES:  You will likely be started on an anti-platelet medication, such as Plavix. Please call the [facility] with any questions regarding this medication.  If you have stopped any medicines, check with your provider about when to restart them.  You will require lifetime prophylactic antibiotics for dental cleanings and dental work after your TAVR, please inquire with your provider prior to your scheduled cleanings.     FOLLOW-UP APPOINTMENTS:  Follow-up with a Structural Heart Nurse Practitioner at [facility] in 7-10 days after your procedure.  You will also follow-up at [facility] 1 month after your procedure which will include a visit with a nurse practitioner, an echocardiogram (Echo), and electrocardiogram (ECG), and lab work. We will also want to see you back in the clinic 1 year after your procedure.  Cardiac Rehab will contact you for follow up care. You can enroll at a site convenient to you.     CALL THE CLINIC IF:   You have increased pain or a large or growing hard lump around the site.  The site is red, swollen, hot, or tender.  Blood or fluid is draining from the site.  You have chills or a fever greater than 101 F (38 C).  Your arm or leg feels numb, cool, or changes color.  You have hives, a rash, or unusual itching.  New pain in the back or belly that you cannot control with Tylenol.  Any questions or concerns.    OTHER INSTRUCTIONS:  You will receive a card with your new valve information in the mail, directly from the .     CONTACT INFORMATION:   DANIEL  [facility]:  [phone] (7 days a week)  Valve Coordinators can be reached at:  [phone]     Detail Level: Detailed

## 2024-01-22 NOTE — TELEPHONE ENCOUNTER
Per Dr. Ackerman, pt needs to reach out to a sleep medicine provider to manage her sleep medication; this includes trazodone and gabapentin (see refill encounter from 1/10/2024).     Sending [employer] message to pt to encourage her to speak with her PCP, Dr. Rubalcava, who recently refilled pt's gabapentin, if she has not yet established with a sleep medicine provider.    Refill for trazodone will be refused, per Dr. Ackerman's directive.

## 2024-01-22 NOTE — TELEPHONE ENCOUNTER
Patient called the clinic stating her 1/24/24 appt got cancelled by the clinic and she scheduled another one for 2/15, but she is out of Trazodone. Pt just ran out today. Requesting enough until seen.

## 2024-02-02 NOTE — PROGRESS NOTES
Clinical Product Navigator reviewed chart; patient on payer product coverage.  Review results:   CPN Initial Information Gathering  Referral Source: Health Plan     Patient identified by health plan for care management support with high probability risk of admission due to chronic medical conditions and provider/specialty utilization. CPN will follow to monitor utilization and potential intervention.    Gianna Sloan RN   Clinical Product Navigator   [email or web address]   Office: [phone]

## 2024-02-08 NOTE — PROGRESS NOTES
Add FeNO to scheduled Full PFT appointment on 2/12/24 per request of Dr Hernandez. Order placed for FeNO and appointment updated.     Valente Sommers RN

## 2024-02-15 PROBLEM — F32.0 MILD MAJOR DEPRESSION (H): Status: ACTIVE | Noted: 2024-01-01

## 2024-02-15 PROBLEM — F33.2 SEVERE EPISODE OF RECURRENT MAJOR DEPRESSIVE DISORDER, WITHOUT PSYCHOTIC FEATURES (H): Status: RESOLVED | Noted: 2023-01-01 | Resolved: 2024-01-01

## 2024-02-15 PROBLEM — J96.01 ACUTE RESPIRATORY FAILURE WITH HYPOXIA (H): Status: RESOLVED | Noted: 2021-12-17 | Resolved: 2024-01-01

## 2024-02-15 PROBLEM — K70.30 ALCOHOLIC CIRRHOSIS OF LIVER WITHOUT ASCITES (H): Status: RESOLVED | Noted: 2023-01-01 | Resolved: 2024-01-01

## 2024-02-15 NOTE — TELEPHONE ENCOUNTER
PHCY inquires:    Is patient to take BOTH gabapentin 300mg + 600mg?     Or    Is patient gabapentin 300mg replacing previous 600mg prescription

## 2024-02-15 NOTE — PROGRESS NOTES
Assessment & Plan     Liver disease, chronic, due to alcohol (H24)  Alcohol use disorder, severe, in early remission (H)  Sober.   Ultrasound in nov 2023 shows liver fibrosis.  I referred her to hepatology but patient was not seen by them.     Morbid (severe) obesity due to excess calories (H)  Will discuss at next visit.     Stage 3a chronic kidney disease (H)  Stable.    Chronic pain syndrome  Stable. Continue medication.  - gabapentin (NEURONTIN) 300 MG capsule; Take 1 capsule (300 mg) by mouth every morning    Mild major depression (H24)  Resume celexa.  Referred to mental health therapy.  - citalopram (CELEXA) 20 MG tablet; Take 1 tablet (20 mg) by mouth daily  - Adult Mental Health  Referral; Future    Cold sore  Stable. Continue medication.  - valACYclovir (VALTREX) 1000 mg tablet; Take 1 tablet (1,000 mg) by mouth as needed (onset of cold sore)      See Patient Instructions    Subjective   Linda is a 78 year old, presenting for the following health issues:  Recheck Medication    History of Present Illness       Mental Health Follow-up:  Patient presents to follow-up on Depression.Patient's depression since last visit has been:  Worse  The patient is having other symptoms associated with depression.      Any significant life events: financial concerns and health concerns  Patient is not feeling anxious or having panic attacks.  Patient has no concerns about alcohol or drug use.    Reason for visit:  General follow up and depression    She eats 2-3 servings of fruits and vegetables daily.She consumes 1 sweetened beverage(s) daily.She exercises with enough effort to increase her heart rate 10 to 19 minutes per day.  She exercises with enough effort to increase her heart rate 4 days per week.   She is taking medications regularly.     Linda is here for follow up.  She has a hx of h/o paroxysmal SVT and PACs, hypertension, hyperlipidemia, obesity s/p gastric bypass surgery, chronic diastolic heart failure,  "substance use disorder, and severe aortic stenosis s/p TAVR (8/9/2022)   Sober since October 16th. Not using campral.  She stopped celexa a few months ago when she ran out. No SI but depressed.   JENN: has CPAP but didn't like it. Seeing a dentist for mandibular implant.           Objective    /69 (BP Location: Right arm, Patient Position: Sitting, Cuff Size: Adult Large)   Pulse 63   Temp 98.1  F (36.7  C) (Tympanic)   Resp 16   Ht 1.626 m (5' 4\")   Wt 81.9 kg (180 lb 8 oz)   LMP  (LMP Unknown)   SpO2 98%   BMI 30.98 kg/m    Body mass index is 30.98 kg/m .  Physical Exam           Signed Electronically by: Ghazal Rubalcava MD    "

## 2024-02-15 NOTE — TELEPHONE ENCOUNTER
Relayed information to the pharmacy. Pharmacy agreeable.     Niki RN  [facility]

## 2024-04-04 NOTE — TELEPHONE ENCOUNTER
Labs do not need to be re-drawn.  Patient just needs to be scheduled for US and follow up which appear to already be scheduled    Martha ERICKSON RN    [facility]  Office: [phone]  Fax: [phone]

## 2024-04-04 NOTE — TELEPHONE ENCOUNTER
[facility]    Who is the name of the provider?:  SAVANNAH ANDRES   What is the location you see this provider at/preferred location?: [place]  Person calling / Facility: Kavitha Headley  Phone number:  [phone] (home)  Nurse call back needed:  NO     Reason for call:  Patient had labs drawn on 2/14, but is now calling to follow up. Patient wondering if labs should be redrawn given delay in follow up.    Pharmacy location:     [facility]Y #[id number] - [place] - [address] [facility] AT [place] [address]  [facility], [place] - [address] [place] [address] PHARMACY [place] [place] - [address] PHARMACY 5[facility] - [place], [place] - [address] [facility]  Outside Imaging: n/a   Can we leave a detailed message on this number?  YES     4/4/2024, 4:08 PM

## 2024-04-04 NOTE — TELEPHONE ENCOUNTER
Both ankles are swollen and red.  Also has rash on shoulders and back.  Would like to be seen soon.  Please call patient to discuss this  OK to LM on VM

## 2024-04-05 NOTE — TELEPHONE ENCOUNTER
Patient Contact    Attempt # 1    Was call answered?  No.  Left message on voicemail with information to call triage back.

## 2024-04-08 ENCOUNTER — APPOINTMENT (OUTPATIENT)
Dept: URBAN - METROPOLITAN AREA CLINIC 257 | Age: 79
Setting detail: DERMATOLOGY
End: 2024-04-09

## 2024-04-08 DIAGNOSIS — L29.8 OTHER PRURITUS: ICD-10-CM

## 2024-04-08 DIAGNOSIS — L20.89 OTHER ATOPIC DERMATITIS: ICD-10-CM

## 2024-04-08 PROCEDURE — OTHER MIPS QUALITY: OTHER

## 2024-04-08 PROCEDURE — 99203 OFFICE O/P NEW LOW 30 MIN: CPT

## 2024-04-08 PROCEDURE — OTHER PRESCRIPTION MEDICATION MANAGEMENT: OTHER

## 2024-04-08 PROCEDURE — OTHER PRESCRIPTION: OTHER

## 2024-04-08 PROCEDURE — OTHER COUNSELING: OTHER

## 2024-04-08 RX ORDER — TRIAMCINOLONE ACETONIDE 1 MG/G
CREAM TOPICAL
Qty: 453.6 | Refills: 1 | Status: ERX | COMMUNITY
Start: 2024-04-08

## 2024-04-08 ASSESSMENT — BSA RASH: BSA RASH: 30

## 2024-04-08 ASSESSMENT — LOCATION SIMPLE DESCRIPTION DERM
LOCATION SIMPLE: LEFT UPPER BACK
LOCATION SIMPLE: CHEST

## 2024-04-08 ASSESSMENT — LOCATION DETAILED DESCRIPTION DERM
LOCATION DETAILED: LEFT MEDIAL SUPERIOR CHEST
LOCATION DETAILED: LEFT INFERIOR MEDIAL UPPER BACK

## 2024-04-08 ASSESSMENT — LOCATION ZONE DERM: LOCATION ZONE: TRUNK

## 2024-04-08 NOTE — TELEPHONE ENCOUNTER
"Patient called and LM 4/4/24.      Both ankles are swollen and red.  Also has rash on shoulders and back.  Would like to be seen soon.  Please call patient to discuss this  OK to LM on         4/5/24  Attempt #1 to return call to patient by triage.  No answer.  LM for patient to return call.     Called patient today:    Nurse Triage SBAR    Is this a 2nd Level Triage? NO    Situation: Patient requests appointment to assess bilateral ankle swelling--worse by end of day x 2 wks    Background: no hx    Assessment: see assessment    Protocol Recommended Disposition:   See in Office Today or Tomorrow    Recommendation: Scheduled appointment with same day provider tomorrow.   Advised patient seek med care today if symptoms worsen:  pain, redness, fever.        Reason for Disposition   Patient wants to be seen    Additional Information   Negative: Difficulty breathing   Negative: Entire foot is cool or blue in comparison to other side   Negative: Ankle pain and fever   Negative: Ankle redness and fever   Negative: Patient sounds very sick or weak to the triager   Negative: SEVERE pain (e.g., excruciating, unable to walk) and not improved after 2 hours of pain medicine   Negative: Redness and painful when touched and no fever   Negative: Red area or streak > 2 inches (or 5 cm)   Negative: Thigh or calf pain and only 1 side and present > 1 hour   Negative: Thigh, calf, or ankle swelling and only 1 side   Negative: Thigh, calf, or ankle swelling and bilateral and 1 side is more swollen   Negative: SEVERE swelling (e.g., can't move swollen ankle at all)   Negative: Looks like a boil, infected sore, deep ulcer or other infected rash (spreading redness, pus)    Answer Assessment - Initial Assessment Questions  1. LOCATION: \"Which ankle is swollen?\" \"Where is the swelling?\"      Both .  Slightly warm to touch  2. ONSET: \"When did the swelling start?\"      2 wk  3. SWELLING: \"How bad is the swelling?\" Or, \"How large is it?\" " "(e.g., mild, moderate, severe; size of localized swelling)     - NONE: No joint swelling.    - LOCALIZED: Localized; small area of puffy or swollen skin (e.g., insect bite, skin irritation).    - MILD: Joint looks or feels mildly swollen or puffy.    - MODERATE: Swollen; interferes with normal activities (e.g., work or school); decreased range of movement; may be limping.    - SEVERE: Very swollen; can't move swollen joint at all; limping a lot or unable to walk.      Mild.  Increases as the day goes on.    4. PAIN: \"Is there any pain?\" If Yes, ask: \"How bad is it?\" (Scale 1-10; or mild, moderate, severe)    - NONE (0): no pain.    - MILD (1-3): doesn't interfere with normal activities.     - MODERATE (4-7): interferes with normal activities (e.g., work or school) or awakens from sleep, limping.     - SEVERE (8-10): excruciating pain, unable to do any normal activities, unable to walk.   None  5. CAUSE: \"What do you think caused the ankle swelling?\"      Patient not sure.  Denies redness or pain.   6. OTHER SYMPTOMS: \"Do you have any other symptoms?\" (e.g., fever, chest pain, difficulty breathing, calf pain)      Denies   7. PREGNANCY: \"Is there any chance you are pregnant?\" \"When was your last menstrual period?\"      na    Protocols used: Ankle Swelling-A-OH    "

## 2024-04-08 NOTE — TELEPHONE ENCOUNTER
Called patient.   See Triage encounter 4/8/24    Cassia ERICKSON, RN      April 8, 2024  3:27 PM

## 2024-04-09 NOTE — PATIENT INSTRUCTIONS
Elevate as much as possible    Compression stockings daily if able    Watch salt intake    Don't take losartan for three days    Take lasix 20mg for three days with KCL 20 MEQ    Claritin 10mg in the am (over the counter)    See me in 3 days for a recheck

## 2024-04-09 NOTE — PROGRESS NOTES
Assessment and Plan:     (R60.0) Lower extremity edema  (primary encounter diagnosis)  Comment: onset two weeks ago, also has bilat itching/irritated skin and mild erythema, non-tender, no fever/chills, I doubt this is bilat cellulitis, she has had hypersensitivity reaction to new detergent which could be contrib, no sob, orthopnea or chest pain, lungs CTAB  Plan: furosemide (LASIX) 20 MG tablet, potassium         chloride suleman ER (KLOR-CON M20) 20 MEQ CR         tablet        Elevate, compression stockings  Hold losartan x 3 days, take low dose lasix w/KCL  Claritin daily  See me in three days, sooner if worsens    (N18.30) Stage 3 chronic kidney disease, unspecified whether stage 3a or 3b CKD (H)GFR hovers around 50  Comment:   Plan:     (I10) Benign essential hypertension  Comment: on losartan 50mg daily and lopressor 37.5mg bid  Plan: see above    (F51.04) Chronic insomnia  Comment: requesting refill of neurontin  Plan: gabapentin (NEURONTIN) 600 MG tablet    (G89.4) Chronic pain syndrome  Comment:  Plan: gabapentin (NEURONTIN) 600 MG tablet,         gabapentin (NEURONTIN) 300 MG capsule            [facility] Same Day Provider           Subjective   Linda is a 78 year old, presenting for the following health issues:  Edema (Both ankles are swollen and red///) and Derm Problem (has rash on shoulders and back. )    History of Present Illness       Reason for visit:  Swollen lower legs  Symptom onset:  1-2 weeks ago  Symptoms include:  Swollen lower    She eats 2-3 servings of fruits and vegetables daily.She consumes 1 sweetened beverage(s) daily.She exercises with enough effort to increase her heart rate 10 to 19 minutes per day.  She exercises with enough effort to increase her heart rate 3 or less days per week.   She is taking medications regularly.     Linda is here for bilateral lower ext swelling x 2 weeks  She has also noticed some itching and redness over anterior legs  She thinks the  "redness preceded the itching   It is not painful  She denies fever/chills, open sores, sob, orthopnea    She is currently being treated for allergic reaction to new detergent    She hasn't started any new medications recently    BP is soft today, she notes it is labile and at times she will hold losartan if too low  She hasn't had any water yet today   She denies dizziness/near syncope, chest pain        Objective    LMP  (LMP Unknown)   There is no height or weight on file to calculate BMI.    BP 98/68   Pulse 88   Temp 97.2  F (36.2  C) (Temporal)   Resp 18   Ht 1.626 m (5' 4\")   Wt 78.6 kg (173 lb 4.8 oz)   LMP  (LMP Unknown)   SpO2 93%   BMI 29.75 kg/m      Physical Exam     GENERAL: healthy, alert and no distress  RESP: lungs clear to auscultation - no rales, no rhonchi, no wheezes  CV: regular rates and rhythm, normal S1 S2, no S3 or S4 and no murmur, no click or rub   MS: extremities- no gross deformities noted, moderate pitting edema bilat lower ext over anterior ankles and lower shins, mild erythema, non-tender, no open sores on legs or feet bilaterally, DP and PT pulses palpable     Signed Electronically by: Radha Howard PA-C    "

## 2024-04-12 PROBLEM — M79.89 LEG SWELLING: Status: ACTIVE | Noted: 2024-01-01

## 2024-04-12 PROBLEM — Z95.2 HISTORY OF TRANSCATHETER AORTIC VALVE REPLACEMENT (TAVR): Status: ACTIVE | Noted: 2024-01-01

## 2024-04-12 PROBLEM — I95.9 HYPOTENSION, UNSPECIFIED HYPOTENSION TYPE: Status: ACTIVE | Noted: 2024-01-01

## 2024-04-12 PROBLEM — R79.89 ELEVATED TROPONIN: Status: ACTIVE | Noted: 2024-01-01

## 2024-04-12 PROBLEM — R06.09 EXERTIONAL DYSPNEA: Status: ACTIVE | Noted: 2024-01-01

## 2024-04-12 NOTE — PROGRESS NOTES
"Assessment and Plan:     (I95.9) Hypotension, unspecified hypotension type  (primary encounter diagnosis)  Comment: BP quite low today despite being off all antihypertensives/diuretics x >24 hours, last doses of metoprolol and losartan were on Monday, last dose of lasix was yesterday am, had her sit in office for about 45 minutes and drink water without improvement, she denies chest pain, sob but does appear pale/grey and notes feeling \"off\" the last several days, no fever/chills, had recent abnormal lexiscan   Plan: discussed with patient I am concerned about low pressures with heart history and new leg swelling, recommend she go to ED for evaluation, she declined ambulance, she will drive herself, called ED at [facility] to let them know she'll be coming and my concerns     (R60.0) Lower extremity edema  Comment: ongoing x > 2 weeks, better with lasix but still edematous, R>L, also has erythema and itchiness   Plan: cefadroxil (DURICEF) 500 MG capsule, US Lower         Extremity Venous Duplex Bilateral,         Echocardiogram Complete        See miracle    (Z95.2) History of aortic valve replacement - Severe aortic stenosis status post TAVR with 23 mm S3 valve on 8/9/22  Comment: follows with [facility], had a lexisan on 4/2/24, don't see cardiology comment on it, results \"probably abnormal\" with small are of mild ischemia in the apical left ventricle w/hyperdynamic LV  Plan:     (N18.31) Stage 3a chronic kidney disease (H)  Comment:   Plan:     [facility] Same Day Provider   30 minutes on the day of the encounter doing chart review, history and exam, documentation and further activities as noted above.        Subjective   Linda is a 78 year old, presenting for the following health issues:  Follow Up (Patient is here for follow up for lower extremity edema.  Patient hypotensive since Tuesday.  Blood pressure today 88/60 machine and 82/40 manual.)      4/12/2024     1:58 PM   Additional Questions   Roomed " by Arnold NOLAN MA   Accompanied by KRYSTIAN KHALIL     Kavitha is here for follow-up  Saw me earlier this week for bilat lower ext edema and lower ext itching/irritation   Had soft pressure at that time  Held losartan and prescribed low dose lasix    She also held her metoprolol due to lower pressures so has been off both losartan and metoprolol   Last dose of lasix was yesterday AM    Swelling in LLE has improved signficantly  Still having swelling in RLE  Continues to have some erythema and itching     She continues to have soft pressure despite being off all antihypertensives >24 hours  She notes that she is not dizzy but feels off    She denies sob, chest pain    She took Lasix T, W, Th  No losartan and metoprolol since Tuesday           Objective    BP (!) 82/40 (BP Location: Left arm, Patient Position: Sitting, Cuff Size: Adult Regular)   Pulse 87   Temp 97.2  F (36.2  C) (Temporal)   Resp 20   Wt 76.3 kg (168 lb 4.8 oz)   LMP  (LMP Unknown)   SpO2 96%   BMI 28.89 kg/m    Body mass index is 28.89 kg/m .    Physical Exam     GENERAL: appears a bit grey in color, in NAD  RESP: lungs clear to auscultation - no rales, no rhonchi, no wheezes  CV: regular rates and rhythm, systolic murmur noted  MS: extremities- no gross deformities noted, pitting edema bilat lower ext R>L with erythema to knees bilat, no open lesions             Signed Electronically by: Radha Howard PA-C

## 2024-04-12 NOTE — PATIENT INSTRUCTIONS
Ultrasound today    Start cefadroxil (antibiotic) today    Stop lasix (furosemide)    Hold (don't take) losartan and metoprolol x next 2 days then resume metoprolol at 25mg twice daily (if blood pressure >115 systolic    Elevate legs as much as possible, compression stockings     See me on next Wednesday

## 2024-04-12 NOTE — Clinical Note
6 Fr Angio-Seal utilized for closure of site.  Manual pressure applied by scrub person; hemostasis achieved.

## 2024-04-12 NOTE — Clinical Note
Potential access sites were evaluated for patency using ultrasound.   The right femoral artery was selected. Access was obtained under with Sonosite and Fluoroscopic guidance using a standard 21 guage needle with direct visualization of needle entry.

## 2024-04-12 NOTE — ED PROVIDER NOTES
"History   Chief Complaint:  Low BP    HPI  Kavitha Headley is a 78 year old female who presents for evaluation of low blood pressure as well as shortness of breath and leg swelling.  She had a TAVR several years ago, she states she has some degree of chronic shortness of breath of about 2 weeks ago this became somewhat worse, especially with activity, but not associated with any chest pain.  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  She lives alone except on long weekends.  She went to clinic on April 9 for the symptoms, she was initiated on Lasix 20 mg daily and also advised to hold her losartan, blood pressure in clinic that day was 98/68.  She returned today to clinic for reevaluation, despite holding her antihypertensive her blood pressure was 82/40, she was advised to come to the emergency department for further evaluation.  She reports that her leg swelling has improved slightly on the Lasix.  She has sleep apnea but does not have any worsening from her baseline orthopnea.  She feels okay when she is at rest.  No syncope but she reports she feels \"off\", no lateralizing symptoms.  She has never been a tobacco smoker.    Review of External Notes: I reviewed her prior clinic notes from February 9 and earlier today as well.    I reviewed her stress test results from 4/2/24:      The nuclear stress test is probably abnormal.    There is a small area of mild ischemia in the apical segment(s) of the left ventricle. There is breast tissue attenuation affecting this area (anteroseptal/anterior/apical)    Left ventricular function is hyperdynamic.    The left ventricular ejection fraction at stress is 82%.    There is no prior study for comparison.    Medications:    acetaminophen (TYLENOL) 500 MG tablet  aspirin (ASA) 81 MG EC tablet  bisacodyl (DULCOLAX) 5 MG EC tablet  citalopram " (CELEXA) 20 MG tablet  folic acid (FOLVITE) 1 MG tablet  furosemide (LASIX) 20 MG tablet  gabapentin (NEURONTIN) 300 MG capsule  gabapentin (NEURONTIN) 600 MG tablet  losartan (COZAAR) 50 MG tablet  metoprolol tartrate (LOPRESSOR) 25 MG tablet  Multiple Vitamins-Minerals (CENTRUM SILVER) per tablet  naloxone (NARCAN) 4 MG/0.1ML nasal spray  polyethylene glycol (MIRALAX) 17 GM/Dose powder  potassium chloride suleman ER (KLOR-CON M20) 20 MEQ CR tablet  rosuvastatin (CRESTOR) 20 MG tablet  tiZANidine (ZANAFLEX) 4 MG capsule  traZODone (DESYREL) 100 MG tablet  valACYclovir (VALTREX) 1000 mg tablet  zolpidem ER (AMBIEN CR) 6.25 MG CR tablet    Past Medical History:    Past Medical History:   Diagnosis Date    Alcohol abuse     Alcohol dependence in remission (H)     Annual physical exam     Anxiety disorder     Ascending aorta dilatation (H24)     Bariatric surgery status 1996?    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)     Macrocytic anemia     Major depressive disorder, single episode, severe, without mention of psychotic behavior     Mild recurrent major depression (H24)     Mixed hyperlipidemia     Need for prophylactic vaccination and inoculation against influenza     Non-traumatic rhabdomyolysis     Obesity, Class I, BMI 30.0-34.9 (see actual BMI) 09/18/2023    Pelvic relaxation disorder     Personal history of urinary calculi 06/2006    Psoriasis     Spinal stenosis     Stage III chronic kidney disease (H) 2005       Patient Active Problem List    Diagnosis Date Noted    Leg swelling 04/12/2024     Priority: Medium    Exertional dyspnea 04/12/2024     Priority: Medium    Elevated troponin 04/12/2024      Priority: Medium    Hypotension, unspecified hypotension type 04/12/2024     Priority: Medium    History of transcatheter aortic valve replacement (TAVR) 04/12/2024     Priority: Medium    Mild major depression (H24) 02/15/2024     Priority: Medium    Alcohol dependence (H) 10/25/2023     Priority: Medium    Shortness of breath 10/25/2023     Priority: Medium    Anemia, unspecified type 10/25/2023     Priority: Medium    History of aortic valve replacement - Severe aortic stenosis status post TAVR with 23 mm S3 valve on 8/9/22 09/18/2023     Priority: Medium    Morbid (severe) obesity due to excess calories (H) 03/16/2022     Priority: Medium    Chronic diastolic (congestive) heart failure (H) 01/17/2022     Priority: Medium    Enlarged pulmonary artery (H) 12/17/2021     Priority: Medium    Cold sore 03/04/2021     Priority: Medium    Gastroesophageal reflux disease with esophagitis - chronic due to alcohol 03/04/2021     Priority: Medium    Psoriasis - on Humira - Dr. Gauri Clark with dermatology 12/04/2019     Priority: Medium    Thiamine deficiency 12/04/2019     Priority: Medium    Mild ascending aorta dilatation (H24)      Priority: Medium    Coronary artery disease involving native coronary artery of native heart without angina pectoris 10/16/2018     Priority: Medium     Minimal coronary artery disease on coronary angiogram in 2015.       Liver disease, chronic, due to alcohol (H24) 08/15/2018     Priority: Medium    Vitamin B12 deficiency (non anemic) 02/06/2018     Priority: Medium    H/O gastric bypass 07/25/2017     Priority: Medium    Anxiety 07/10/2014     Priority: Medium    Chronic pain syndrome      Priority: Medium     Patient is followed by Alison Pena MD for ongoing prescription of pain medication.  All refills should be approved by this provider, or covering partner.    Medication(s): tramadol 20 mg BID prn pain.   Maximum quantity per month: 60  Clinic visit frequency required: Q 6  months  "    Controlled substance agreement:  Encounter-Level CSA:     There are no encounter-level csa.        Pain Clinic evaluation in the past: No    DIRE Total Score(s):  No flowsheet data found.    Last [facility] website verification:  none   [email or web address]      Bariatric surgery status      Priority: Medium     gastric bypass, [facility] and      CKD (chronic kidney disease) stage 3, GFR 30-59 ml/min (H) 11/28/2012     Priority: Medium    Alcohol use disorder, severe, in early remission (H) 03/14/2012     Priority: Medium    Benign essential hypertension      Priority: Medium    Chronic insomnia      Priority: Medium    Spinal stenosis 12/21/2010     Priority: Medium    Personal history of urinary calculi 06/01/2006     Priority: Medium     left ureteral stone,lithotripsy        Physical Exam   Patient Vitals for the past 24 hrs:   BP Temp Temp src Pulse Resp SpO2 Height Weight   04/12/24 2300 -- -- -- 90 28 -- -- --   04/12/24 2200 122/69 -- -- 87 21 99 % -- --   04/12/24 2121 -- -- -- 89 16 -- -- --   04/12/24 2120 -- -- -- -- 21 -- -- --   04/12/24 2100 (!) 152/88 -- -- -- -- -- -- --   04/12/24 2000 125/67 -- -- 80 17 97 % -- --   04/12/24 1951 -- -- -- 79 15 97 % -- --   04/12/24 1739 108/61 98.2  F (36.8  C) Temporal 80 20 97 % 1.626 m (5' 4\") 79.4 kg (175 lb)      Physical Exam  General: woman sitting upright in chair  HENT: mucous membranes moist  CV: rate as above, regular rhythm, moderate symmetric lower extremity edema, +systolic murmur  Resp: speaks in full phrases, no respiratory distress, no stridor   GI: abdomen soft and nontender, no guarding  MSK: no bony tenderness  Skin: appropriately warm and dry, mild erythema to both lower legs  Neuro: alert, clear speech, oriented  Psych: cooperative, pleasant    Emergency Department Course   Electrocardiogram  ECG taken at 1749, ECG interpreted at 1838 by J. Reitsema, MD  Sinus rhythm, pre-existing first-degree AV block, somewhat low voltage is not " significantly changed compared to November 6, 2023  Rate 74 bpm. NM interval 210. QRS duration 114. QTc 477    Imaging:  US Lower Extremity Venous Duplex Bilateral   Final Result   IMPRESSION:   1.  No deep venous thrombosis in the bilateral lower extremities.      XR Chest 2 Views   Final Result   IMPRESSION: Heart size is enlarged but stable. Status post TAVR. No CHF, lobar consolidation or effusion. No acute bony abnormalities.         Laboratory:  Labs Ordered and Resulted from Time of ED Arrival to Time of ED Departure   COMPREHENSIVE METABOLIC PANEL - Abnormal       Result Value    Sodium 137      Potassium 4.2      Carbon Dioxide (CO2) 26      Anion Gap 13      Urea Nitrogen 14.5      Creatinine 1.17 (*)     GFR Estimate 47 (*)     Calcium 9.2      Chloride 98      Glucose 91      Alkaline Phosphatase 64      AST 36      ALT 15      Protein Total 6.9      Albumin 3.8      Bilirubin Total 0.5     TROPONIN T, HIGH SENSITIVITY - Abnormal    Troponin T, High Sensitivity 27 (*)    CBC WITH PLATELETS AND DIFFERENTIAL - Abnormal    WBC Count 6.6      RBC Count 3.63 (*)     Hemoglobin 12.5      Hematocrit 37.2       (*)     MCH 34.4 (*)     MCHC 33.6      RDW 13.6      Platelet Count 275      % Neutrophils 49      % Lymphocytes 21      % Monocytes 11      % Eosinophils 18      % Basophils 1      % Immature Granulocytes 0      NRBCs per 100 WBC 0      Absolute Neutrophils 3.3      Absolute Lymphocytes 1.4      Absolute Monocytes 0.7      Absolute Eosinophils 1.2 (*)     Absolute Basophils 0.1      Absolute Immature Granulocytes 0.0      Absolute NRBCs 0.0     LACTIC ACID WHOLE BLOOD - Normal    Lactic Acid 1.8     NT PROBNP INPATIENT - Normal    N terminal Pro BNP Inpatient 600     HEMOGLOBIN A1C - Normal    Hemoglobin A1C 5.1     TROPONIN T, HIGH SENSITIVITY   TROPONIN T, HIGH SENSITIVITY   BLOOD CULTURE   BLOOD CULTURE      Emergency Department Course:  Reviewed:  I reviewed nursing notes, vitals, and past  medical history    Assessments/Consultations/Discussion of Management or Tests :  I obtained history and examined the patient as noted above.   ED Course as of 04/12/24 1394   Fri Apr 12, 2024 1920 I rechecked patient   1928 I spoke with Dr. Caldera, Hospitalist, who accepts care.  He requests heparin gtt.       Independent Interpretation (X-rays, CTs, rhythm strip):  I personally reviewed CXR images, I see evidence of prior TAVR, no focal infiltrate.    Interventions:  Medications   heparin infusion 25,000 units in D5W 250 mL ANTICOAGULANT (950 Units/hr Intravenous Rate/Dose Verify 4/12/24 2300)   lidocaine 1 % 0.1-1 mL (has no administration in time range)   lidocaine (LMX4) cream (has no administration in time range)   sodium chloride (PF) 0.9% PF flush 3 mL (3 mLs Intracatheter Not Given 4/12/24 3678)   sodium chloride (PF) 0.9% PF flush 3 mL (has no administration in time range)   senna-docusate (SENOKOT-S/PERICOLACE) 8.6-50 MG per tablet 1 tablet (has no administration in time range)     Or   senna-docusate (SENOKOT-S/PERICOLACE) 8.6-50 MG per tablet 2 tablet (has no administration in time range)   calcium carbonate (TUMS) chewable tablet 1,000 mg (has no administration in time range)   medication instruction (has no administration in time range)   aspirin (ASA) chewable tablet 81 mg (has no administration in time range)   HOLD: nitroGLYcerin IF (has no administration in time range)   Patient is already receiving anticoagulation with heparin, enoxaparin (LOVENOX), warfarin (COUMADIN)  or other anticoagulant medication (has no administration in time range)   acetaminophen (TYLENOL) tablet 650 mg (has no administration in time range)     Or   acetaminophen (TYLENOL) Suppository 650 mg (has no administration in time range)   ondansetron (ZOFRAN ODT) ODT tab 4 mg (has no administration in time range)     Or   ondansetron (ZOFRAN) injection 4 mg (has no administration in time range)   Continuing beta blocker from  home medication list OR beta blocker order already placed during this visit (has no administration in time range)   Reason ACE/ARB/ARNI order not selected (has no administration in time range)   Continuing statin from home medication list OR statin order already placed during this visit (has no administration in time range)   aspirin (ASA) chewable tablet 324 mg (324 mg Oral $Given 4/12/24 1947)   heparin loading dose for LOW INTENSITY TREATMENT * Give BEFORE starting heparin infusion (4,750 Units Intravenous $Given 4/1945)      Social Determinants of Health affecting care:   None    Disposition:  Admit to Dr. Caldera     Impression & Plan    Medical Decision Making:  She presents with a worrisome constellation of issues, including hypotension in the setting of baseline hypertension as well as worsening exertional dyspnea with a recent abnormal stress test.  Her troponin is also elevated, and given her symptoms, I think she should be treated for NSTEMI with heparin drip and aspirin, this was administered after discussion with the accepting hospitalist.  However, she is currently without chest symptoms while at rest, electrocardiogram shows no ST elevation, so I do not think that emergent cardiology consultation is indicated.  She was hypotensive earlier today but blood pressures here are satisfactory.  She has a complex cardiac history.  I think she would benefit from hospitalization for close monitoring and further care, she agrees with this.  I do not think that her hypotension was due to sepsis, so antibiotics were deferred, note made that her lactic acid level and white blood cell count are normal.  We discussed her recently abnormal stress test.  She is admitted to a monitored bed under the care of the hospitalist.    Diagnosis:    ICD-10-CM    1. NSTEMI (non-ST elevated myocardial infarction) (H)  I21.4       2. Hypotension, unspecified hypotension type  I95.9       3. Exertional dyspnea  R06.09       4.  History of transcatheter aortic valve replacement (TAVR)  Z95.2       5. Leg swelling  M79.89            4/12/2024   MD Ernestina Truong Jeffrey Alan, MD  04/12/24 6373

## 2024-04-13 NOTE — PROGRESS NOTES
[facility]    Hospitalist Progress Note    Brief Summary:  Kavitha Headley is a 78 year old female with medical history significant for severe aortic stenosis s/p TAVR 08/2022, chronic diastolic CHF, dilated ascending aorta, paroxysmal SVT, HTN, HL, CKD stage III, chronic anemia, morbid obesity s/p gastric bypass, alcoholic liver disease, depression and anxiety, insomnia was sent to the ER from the clinic for evaluation of exertional dyspnea, leg edema and hypotension.  Patient is admitted on 4/12/2024 for further management.     Assessment & Plan        NSTEMI  Suspected acute on chronic diastolic CHF  Abnormal stress test 4/2/2024  This is a 78-year-old female with multiple comorbidities as above, presented with exertional dyspnea which is genetically well-known, chest heaviness with activity, mildly elevated troponin but serial troponin flat    Twelve-lead EKG without ischemic changes.  Chest x-ray without pulmonary edema.  proBNP only 600.  Lactic acid negative.  Stress test as outpatient 4/2 showed mild ischemia in the apical segment of the LV, probably abnormal, and hyperdynamic LV function.    She is on IV heparin, aspirin.  Cardiology is consulted.  Cardiologist has evaluate the patient recommend coronary angiogram which I agree is scheduled for Monday.  Continue with IV heparin for now.  Blood pressure is now stable, I will resume her PTA metoprolol but continue to hold losartan at this time  I will resume her Lasix 20 mg daily.  She has mild lower extremity edema right more than left.  Echocardiogram ordered is pending at this time    -Resume PTA statin.  -Fluid restriction, Daily weight, intake and output.     Severe aortic stenosis s/p TAVR 8/9/2022  Hypertension, now with hypotension:  Paroxysmal SVT  Hyperlipidemia  Ascending aortic dilation, 4.1 cm, stable  Resume metoprolol as appropriate stable.  Continue hold losartan at this time, will resume Lasix 20 mg daily  -Resume  statin  Echocardiogram pending at this time     CKD stage III  Creatinine is 1.1 which seems to be her baseline.        Bilateral leg edema  Patient has trace left lower extremity and 1+ lower EXTR edema.  She has prior bilateral TKA as well.  Ultrasound bilateral lower extremity negative for DVT  Resume low-dose Lasix 2 mg daily now.  Will benefit from compression stockings as well       Liver disease, presumed alcohol related  Limited abdominal ultrasound 11/2023 showed coarsened echotexture suggesting underlying fibrosis.  Patient is s/p cholecystectomy.  Patient with morbid obesity s/p gastric bypass.  Unclear if above represents liver disease due to progression of steatohepatitis related to obesity or alcoholic liver disease.  -Platelet counts normal  -Reports she stopped drinking alcohol since February 1, 2024.  Albumin normal on admission     Generalized anxiety disorder  Major depressive disorder  Insomnia  Chronic pain syndrome  Resume PTA gabapentin, trazodone and Ambien when verified.      Neck pain with right upper extremity weakness  Patient does have history of neck pain, was seen at the TCU  MRI of the brain and cervical spine recommended.  Patient is requesting to be done during her admission here  Will order MRI of the cervical spine and brain      DVT Prophylaxis: Heparin   Code Status: Full Code    Disposition: Expected discharge in 2 days once stable.  After cardiology    Latrell Corbett MD, MD  Text Page  (7am - 6pm)    Interval History   Patient seen and evaluated in her room today, still having some exertional dyspnea but better.  No fever chills chest pain headache dizziness or lightheadedness    No other significant event over    -Data reviewed today: I reviewed all new labs and imaging results over the last 24 hours. I personally reviewed no images or EKG's today.    Physical Exam   Temp: 97.7  F (36.5  C) Temp src: Oral BP: 110/62 Pulse: 91   Resp: 18 SpO2: 95 % O2 Device: None (Room air)     Vitals:    04/12/24 1739 04/13/24 0200 04/13/24 0625   Weight: 79.4 kg (175 lb) 76 kg (167 lb 9.6 oz) 75.8 kg (167 lb)     Vital Signs with Ranges  Temp:  [97.7  F (36.5  C)-98.6  F (37  C)] 97.7  F (36.5  C)  Pulse:  [] 91  Resp:  [15-28] 18  BP: ()/(46-88) 110/62  SpO2:  [93 %-99 %] 95 %  I/O last 3 completed shifts:  In: 30.4 [I.V.:30.4]  Out: -     Constitutional: awake, alert, cooperative, no apparent distress, and appears stated age  Eyes: Lids and lashes normal, pupils equal, round and reactive to light, extra ocular muscles intact, sclera clear, conjunctiva normal  Respiratory: No increased work of breathing, good air exchange, clear to auscultation bilaterally, no crackles or wheezing  Cardiovascular: Normal apical impulse, regular rate and rhythm, normal S1 and S2, no S3 or S4, and no murmur noted  GI: No scars, normal bowel sounds, soft, non-distended, non-tender, no masses palpated, no hepatosplenomegally  Musculoskeletal: 1+ right and trace left lower extremity pitting edema present  Neurologic: No focal deficit    Medications   Current Facility-Administered Medications   Medication Dose Route Frequency Provider Last Rate Last Admin    Continuing beta blocker from home medication list OR beta blocker order already placed during this visit   Does not apply DOES NOT GO TO Lina Gonsalez MD        Continuing statin from home medication list OR statin order already placed during this visit   Does not apply DOES NOT GO TO Lina Gonsalez MD        heparin infusion 25,000 units in D5W 250 mL ANTICOAGULANT  0-5,000 Units/hr Intravenous Continuous Lina Caldera MD 4.5 mL/hr at 04/13/24 0809 450 Units/hr at 04/13/24 0809    medication instruction   Does not apply Continuous PRN Lina Caldera MD        Patient is already receiving anticoagulation with heparin, enoxaparin (LOVENOX), warfarin (COUMADIN)  or other anticoagulant medication   Does not apply Continuous PRN Verenice  Lina GARCIA MD        Reason ACE/ARB/ARNI order not selected   Other DOES NOT GO TO Lina Gonsalez MD         Current Facility-Administered Medications   Medication Dose Route Frequency Provider Last Rate Last Admin    aspirin (ASA) chewable tablet 81 mg  81 mg Oral Daily Lina Caldera MD   81 mg at 04/13/24 1021    [Held by provider] aspirin EC tablet 81 mg  81 mg Oral Daily Latrell Corbett MD        [Held by provider] cefadroxil (DURICEF) capsule 500 mg  500 mg Oral BID Latrell Corbett MD        citalopram (celeXA) tablet 20 mg  20 mg Oral Daily Latrell Corbett MD   20 mg at 04/13/24 1021    folic acid (FOLVITE) tablet 1 mg  1 mg Oral Daily Latrell Corbett MD   1 mg at 04/13/24 1020    [Held by provider] furosemide (LASIX) tablet 20 mg  20 mg Oral Daily Latrell Corbett MD        gabapentin (NEURONTIN) capsule 300 mg  300 mg Oral QAM Latrell Corbett MD   300 mg at 04/13/24 1021    gabapentin (NEURONTIN) tablet 600 mg  600 mg Oral At Bedtime Latrell Corbett MD        [Held by provider] losartan (COZAAR) tablet 50 mg  50 mg Oral Daily Latrell Corbett MD        metoprolol tartrate (LOPRESSOR) half-tab 37.5 mg  37.5 mg Oral BID Latrell Corbett MD   37.5 mg at 04/13/24 1021    [Held by provider] multivitamin w/minerals (THERA-VIT-M) tablet 1 tablet  1 tablet Oral Daily Latrell Corbett MD        rosuvastatin (CRESTOR) tablet 20 mg  20 mg Oral Daily Latrell Corbett MD   20 mg at 04/13/24 1021    sodium chloride (PF) 0.9% PF flush 3 mL  3 mL Intracatheter Q8H Lina Caldera MD   3 mL at 04/13/24 1328    traZODone (DESYREL) tablet 100 mg  100 mg Oral At Bedtime Latrell Corbett MD        [Held by provider] valACYclovir (VALTREX) tablet 1,000 mg  1,000 mg Oral Q12H [facility] (08/20) Latrell Corbett MD           Data   Recent Labs   Lab 04/13/24  0607 04/12/24  1754   WBC 6.8 6.6   HGB 11.2* 12.5   * 103*    275    137   POTASSIUM 4.0 4.2   CHLORIDE  101 98   CO2 29 26   BUN 15.3 14.5   CR 1.17* 1.17*   ANIONGAP 9 13   BIRGIT 8.9 9.2   GLC 96 91   ALBUMIN  --  3.8   PROTTOTAL  --  6.9   BILITOTAL  --  0.5   ALKPHOS  --  64   ALT  --  15   AST  --  36       Recent Results (from the past 24 hour(s))   XR Chest 2 Views    Narrative    EXAM: XR CHEST 2 VIEWS  LOCATION: [facility]  DATE: 4/12/2024    INDICATION: SOB, bilat leg swelling  COMPARISON: 08/10/2022      Impression    IMPRESSION: Heart size is enlarged but stable. Status post TAVR. No CHF, lobar consolidation or effusion. No acute bony abnormalities.   US Lower Extremity Venous Duplex Bilateral    Narrative    EXAM: US LOWER EXTREMITY VENOUS DUPLEX BILATERAL  LOCATION: [facility]  DATE: 4/12/2024    INDICATION: leg edema, eval for DVT  COMPARISON: None.  TECHNIQUE: Venous Duplex ultrasound of bilateral lower extremities with and without compression, augmentation and duplex. Color flow and spectral Doppler with waveform analysis performed.    FINDINGS: Exam includes the common femoral, femoral, popliteal veins as well as segmentally visualized deep calf veins and greater saphenous vein.     RIGHT: No deep vein thrombosis. No superficial thrombophlebitis. No popliteal cyst.    LEFT: No deep vein thrombosis. No superficial thrombophlebitis. No popliteal cyst.      Impression    IMPRESSION:  1.  No deep venous thrombosis in the bilateral lower extremities.

## 2024-04-13 NOTE — CONSULTS
[facility]    Cardiology Consultation     Date of Admission:  4/12/2024    Assessment & Plan   Kavitha Headley is a 78 year old female who was admitted on 4/12/2024.      Impression:  1.  Shortness of breath which is worse than her usual baseline shortness of breath with exertion and chest pressure with exertion with a small troponin rise.  Equivocal nuclear stress test last August.  Possibly suggestive of angina pectoris.  However coronary angiogram in 2021 showed minimal coronary artery disease.  2.  Lower extremity edema but proBNP is normal and jugular venous pulse is not raised and no evidence of pulmonary vascular congestion on chest x-ray.  Not fully clear whether this is congestive heart failure.  Normal ejection fraction in August 2023.  No evidence of DVT.  3.  Status post TAVR in 2021.  Was functioning normally in late August 2023.  4.  History of liver disease though hepatic synthetic function appears to be normal as is albumin.    Plan:  1.  Await the results of the echocardiogram which has been ordered.  2.  Should have a coronary angiogram performed.  Would probably perform invasive coronary angiogram on Monday rather than a CT coronary angiogram which uses significantly more contrast.  Discussed this with the patient and she is willing to proceed.  3.  Continue with aspirin and heparin.      Ferny Vera MD, [facility], [facility]    Primary Care Physician   Ghazal Rubalcava    Reason for Consult   Reason for consult: I was asked by hospitalist service to evaluate this patient for increasing ankle edema and shortness of breath..    History of Present Illness   Kavitha Headley is a 78 year old female who presents with increasing shortness of breath on exertion associated with ankle edema and some redness in the lower extremities and chest pressure with exertion over the last 10 days.  This is a patient who underwent TAVR implantation in 2021.  At that time she had minimal coronary  artery disease on coronary angiography pre-TAVR.  Her ejection fraction most recently has been in the 60 to 65% range, her TAVR is working normally but she did have a nuclear stress test performed in August of last year which suggested mild degree of apical ischemia though she also had breast attenuation artifact and so a somewhat equivocal stress test.  Troponins were raised with first troponin being 27 and the second troponin being 22.  Twelve-lead electrocardiogram did not show acute ischemic changes.  She has mild renal insufficiency.  She has a past history of liver disease though her hepatic function appears to be normal and in particular her albumin was normal.  Her proBNP was normal for her age.  Chest x-ray did not show evidence of pulmonary edema or early congestion.  No evidence of DVT on ultrasound of the lower extremities.    Past Medical History   Past Medical History:   Diagnosis Date    Alcohol abuse     Alcohol dependence in remission (H)     Annual physical exam     Anxiety disorder     Ascending aorta dilatation (H24)     Bariatric surgery status 1996?    gastric bypass, [facility] and    Benign hypertension     Chronic insomnia     Chronic pain syndrome     Chronic back and neck pain, chronic pain due to osteoarthritis multiple joints    Coronary artery disease involving native coronary artery of native heart without angina pectoris 10/16/2018    Minimal coronary artery disease on coronary angiogram in 2015.     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    Major depressive disorder, single episode, severe, without mention of psychotic behavior     Mild recurrent major depression (H24)     Mixed hyperlipidemia     Need for prophylactic vaccination and  inoculation against influenza     Non-traumatic rhabdomyolysis     Obesity, Class I, BMI 30.0-34.9 (see actual BMI) 09/18/2023    Pelvic relaxation disorder     Surgical intervention for cystocele/rectocele 3,11/2012    Personal history of urinary calculi 06/2006    left ureteral stone,lithotripsy    Psoriasis     Spinal stenosis     Stage III chronic kidney disease (H) 2005         Past Surgical History   Past Surgical History:   Procedure Laterality Date    APPENDECTOMY  3/2004    incidental    ARTHRODESIS TOE(S) Right 1/31/2020    Procedure: RIGHT FIRST METATARSAL PHALANGEAL JOINT ARTHRODESIS;  Surgeon: Steven Reyes MD;  Location:  OR    C MEDIASTINOSCOPY W OR WO BIOPSY  2/2008    Videomediastinoscopy and, for mediastinal adenopathy -reactive lymphoid hyperplasia    CARPAL TUNNEL RELEASE RT/LT  10/2010    Carpometacarpal excisional arthroplasty with a fascial autograft and APL suspension sling (99270). 2. Left thumb metacarpophalangeal joint fusion with autologous bone graft ([id number]). 3. Left endoscopic carpal tunnel release     CHOLECYSTECTOMY, LAPOROSCOPIC  11/2010    Cholecystectomy, Laparoscopic    COLONOSCOPY N/A 9/8/2016    Procedure: COMBINED COLONOSCOPY, SINGLE OR MULTIPLE BIOPSY/POLYPECTOMY BY BIOPSY;  Surgeon: Moe Barlow MD;  Location:  GI    CV CORONARY ANGIOGRAM N/A 6/20/2022    Procedure: Coronary Angiogram;  Surgeon: Nora Parker MD;  Location: [facility] CARDIAC CATH LAB    CV PCI N/A 6/20/2022    Procedure: Percutaneous Coronary Intervention;  Surgeon: Nora Parker MD;  Location: [facility] CARDIAC CATH LAB    CV RIGHT HEART CATH MEASUREMENTS RECORDED N/A 6/20/2022    Procedure: Right Heart Catheterization;  Surgeon: Nora Parker MD;  Location: [facility] CARDIAC CATH LAB    CV TRANSCATHETER AORTIC VALVE REPLACEMENT-FEMORAL APPROACH N/A 8/9/2022    Procedure: Transcatheter Aortic Valve Replacement-Femoral Approach;  Surgeon: Nora Parker MD;  Location: [facility] CARDIAC  CATH LAB    CYSTOCELE REPAIR  11/2012    davinci laparoscopic sacrocolpopexy, enterocele repair, lysis of adhesions, placement of retropubic mid urethral sling, cystoscopy    CYSTOSCOPY, LITHOTRIPSY, COMBINED  6/2006    Left extracorporeal shock wave lithotripsy, cystoscopy, left ureteral stent placement.    CYSTOSCOPY, REMOVE STENT(S), COMBINED  7/2006    Cystoscopy, removal of left ureteral stent, retrograde pyelography, flexible and rigid ureteroscopy and holmium laser lithotripsy, basket removal of stone fragments, ureteral stent placement.     ENDOSCOPIC ULTRASOUND UPPER GASTROINTESTINAL TRACT (GI) N/A 6/12/2017    Procedure: ENDOSCOPIC ULTRASOUND, ESOPHAGOSCOPY / UPPER GASTROINTESTINAL TRACT (GI);  ENDOSCOPIC ULTRASOUND, ESOPHAGOSCOPY / UPPER GASTROINTESTINAL TRACT (GI);  Surgeon: Parth Graham MD;  Location:  GI    HERNIA REPAIR  4/2012    bilateral augmentation mastopexy, ventral hernia repair, and medial thigh liposuction on 04/06/2012.     HYSTERECTOMY VAGINAL, BILATERAL SALPINGO-OOPHERECTOMY, COMBINED  1998    due to myoma and bleeding    JOINT REPLACEMENT, HIP RT/LT  4/2004    right total hip arthroplasty    LAPAROTOMY, LYSIS ADHESIONS, COMBINED  3/2004    lysis adhesions, ventral hernia repair, appendectomy incidentally    LYMPH NODE BIOPSY  4/2008    right axillary, reactive follicular and paracortical hyperplasia.    MAMMOPLASTY AUGMENTATION BILATERAL  4/2012    REPAIR HAMMER TOE Right 1/31/2020    Procedure: WITH SECOND AND THIRD CLAW TOE RECONSTRUCTION;  Surgeon: Steven Reyes MD;  Location:  OR    REVISE RECONSTRUCTED BREAST  6/7/2012    Left breast capsulotomy.     [facility] GASTRIC BYPASS,OBESE<100CM ARIANNA-EN-Y  1996    [facility] REPAIR OF RECTOCELE  3/2012    [facility] TOTAL KNEE ARTHROPLASTY  12/2005    left          Prior to Admission Medications   Prior to Admission Medications   Prescriptions Last Dose Informant Patient Reported? Taking?   Multiple Vitamins-Minerals (CENTRUM SILVER) per  tablet  Self Yes Yes   Sig: Take 1 tablet by mouth daily   acetaminophen (TYLENOL) 500 MG tablet prn Self Yes Yes   Sig: Take 1,000 mg by mouth 2 times daily as needed for pain   aspirin (ASA) 81 MG EC tablet 4/12/2024  Yes Yes   Sig: Take 81 mg by mouth daily   bisacodyl (DULCOLAX) 5 MG EC tablet prn Self Yes Yes   Sig: Take 5 mg by mouth daily as needed for constipation   cefadroxil (DURICEF) 500 MG capsule new, not started  No No   Sig: Take 1 capsule (500 mg) by mouth 2 times daily   citalopram (CELEXA) 20 MG tablet 4/11/2024  No Yes   Sig: Take 1 tablet (20 mg) by mouth daily   folic acid (FOLVITE) 1 MG tablet 4/11/2024 Self Yes Yes   Sig: Take 1 mg by mouth daily   furosemide (LASIX) 20 MG tablet 4/11/2024  No Yes   Sig: Take 1 tablet (20 mg) by mouth daily   gabapentin (NEURONTIN) 300 MG capsule 4/12/2024  No Yes   Sig: Take 1 capsule (300 mg) by mouth every morning   gabapentin (NEURONTIN) 600 MG tablet 4/11/2024  No Yes   Sig: Take 1 tablet (600 mg) by mouth at bedtime   losartan (COZAAR) 50 MG tablet 4/9/2024  No Yes   Sig: Take 1 tablet (50 mg) by mouth daily   metoprolol tartrate (LOPRESSOR) 25 MG tablet 4/9/2024  No Yes   Sig: Take 1.5 tablets (37.5 mg) by mouth 2 times daily   naloxone (NARCAN) 4 MG/0.1ML nasal spray   Yes No   Sig: Spray 4 mg into one nostril alternating nostrils once as needed   polyethylene glycol (MIRALAX) 17 GM/Dose powder  Self Yes Yes   Sig: Take 17 g by mouth 2 times daily as needed for constipation   potassium chloride suleman ER (KLOR-CON M20) 20 MEQ CR tablet Past Week  No Yes   Sig: Take 1 tablet (20 mEq) by mouth daily   rosuvastatin (CRESTOR) 20 MG tablet 4/11/2024  No Yes   Sig: Take 1 tablet (20 mg) by mouth daily   tiZANidine (ZANAFLEX) 4 MG capsule   Yes Yes   Sig: Take 4 mg by mouth 3 times daily as needed for muscle spasms   traZODone (DESYREL) 100 MG tablet 4/11/2024  No Yes   Sig: TAKE 1 TABLET BY MOUTH AT BEDTIME   valACYclovir (VALTREX) 1000 mg tablet prn  No Yes    Sig: Take 1 tablet (1,000 mg) by mouth as needed (onset of cold sore)   zolpidem ER (AMBIEN CR) 12.5 MG CR tablet 4/11/2024  Yes Yes   Sig: Take 12.5 mg by mouth at bedtime      Facility-Administered Medications: None     Current Facility-Administered Medications   Medication Dose Route Frequency Provider Last Rate Last Admin    acetaminophen (TYLENOL) tablet 650 mg  650 mg Oral Q4H PRN Lina Caldera MD        Or    acetaminophen (TYLENOL) Suppository 650 mg  650 mg Rectal Q4H PRN Lina Caldera MD        aspirin (ASA) chewable tablet 81 mg  81 mg Oral Daily Lina Caldera MD        calcium carbonate (TUMS) chewable tablet 1,000 mg  1,000 mg Oral 4x Daily PRN Lina Caldera MD        Continuing beta blocker from home medication list OR beta blocker order already placed during this visit   Does not apply DOES NOT GO TO Lina Gonsalez MD        Continuing statin from home medication list OR statin order already placed during this visit   Does not apply DOES NOT GO TO Lina Gonsalez MD        heparin infusion 25,000 units in D5W 250 mL ANTICOAGULANT  0-5,000 Units/hr Intravenous Continuous Lina Caldera MD        heparin infusion 25,000 units in D5W 250 mL ANTICOAGULANT  0-5,000 Units/hr Intravenous Continuous Lina Caldera MD 4.5 mL/hr at 04/13/24 0809 450 Units/hr at 04/13/24 0809    HOLD: nitroGLYcerin IF   Does not apply HOLD Lina Caldera MD        lidocaine (LMX4) cream   Topical Q1H PRN Lina Caldera MD        lidocaine 1 % 0.1-1 mL  0.1-1 mL Other Q1H PRN Lina Caldera MD        medication instruction   Does not apply Continuous PRN Lina Caldera MD        ondansetron (ZOFRAN ODT) ODT tab 4 mg  4 mg Oral Q6H PRN Lina Caldera MD        Or    ondansetron (ZOFRAN) injection 4 mg  4 mg Intravenous Q6H PRN Lina Caldera MD        Patient is already receiving anticoagulation with heparin, enoxaparin (LOVENOX), warfarin (COUMADIN)  or other anticoagulant  medication   Does not apply Continuous PRN Lina Caldera MD        Reason ACE/ARB/ARNI order not selected   Other DOES NOT GO TO Lina Gonsalez MD        senna-docusate (SENOKOT-S/PERICOLACE) 8.6-50 MG per tablet 1 tablet  1 tablet Oral BID PRN Lina Caldera MD        Or    senna-docusate (SENOKOT-S/PERICOLACE) 8.6-50 MG per tablet 2 tablet  2 tablet Oral BID PRN Lina Caldera MD        sodium chloride (PF) 0.9% PF flush 3 mL  3 mL Intracatheter Q8H Lina Caldera MD        sodium chloride (PF) 0.9% PF flush 3 mL  3 mL Intracatheter q1 min prn Lina Caldera MD         Current Facility-Administered Medications   Medication Dose Route Frequency Provider Last Rate Last Admin    acetaminophen (TYLENOL) tablet 650 mg  650 mg Oral Q4H PRLina Hernandes MD        Or    acetaminophen (TYLENOL) Suppository 650 mg  650 mg Rectal Q4H PRN Lina Caldera MD        aspirin (ASA) chewable tablet 81 mg  81 mg Oral Daily Lina Caldera MD        calcium carbonate (TUMS) chewable tablet 1,000 mg  1,000 mg Oral 4x Daily PRN Lina Caldera MD        Continuing beta blocker from home medication list OR beta blocker order already placed during this visit   Does not apply DOES NOT GO TO Lina Gonsalez MD        Continuing statin from home medication list OR statin order already placed during this visit   Does not apply DOES NOT GO TO Lina Gonsalez MD        heparin infusion 25,000 units in D5W 250 mL ANTICOAGULANT  0-5,000 Units/hr Intravenous Continuous Lina Caldera MD        heparin infusion 25,000 units in D5W 250 mL ANTICOAGULANT  0-5,000 Units/hr Intravenous Continuous Lina Caldera MD 4.5 mL/hr at 04/13/24 0809 450 Units/hr at 04/13/24 0809    HOLD: nitroGLYcerin IF   Does not apply HOLD Lina Caldera MD        lidocaine (LMX4) cream   Topical Q1H PRN Lina Caldera MD        lidocaine 1 % 0.1-1 mL  0.1-1 mL Other Q1H PRN Lina Caldera MD        medication  instruction   Does not apply Continuous PRN Lina Caldera MD        ondansetron (ZOFRAN ODT) ODT tab 4 mg  4 mg Oral Q6H PRN Lina Caldera MD        Or    ondansetron (ZOFRAN) injection 4 mg  4 mg Intravenous Q6H PRN Lina Caldera MD        Patient is already receiving anticoagulation with heparin, enoxaparin (LOVENOX), warfarin (COUMADIN)  or other anticoagulant medication   Does not apply Continuous PRLina Thomson MD        Reason ACE/ARB/ARNI order not selected   Other DOES NOT GO TO Lina Gonsalez MD        senna-docusate (SENOKOT-S/PERICOLACE) 8.6-50 MG per tablet 1 tablet  1 tablet Oral BID PRN Lina Caldera MD        Or    senna-docusate (SENOKOT-S/PERICOLACE) 8.6-50 MG per tablet 2 tablet  2 tablet Oral BID PRLina Thomson MD        sodium chloride (PF) 0.9% PF flush 3 mL  3 mL Intracatheter Q8H Lina Caldera MD        sodium chloride (PF) 0.9% PF flush 3 mL  3 mL Intracatheter q1 min prLina Thomson MD         Allergies   Allergies   Allergen Reactions    Bactrim [Sulfamethoxazole-Trimethoprim] Hives    Codeine Nausea and Itching    Morphine Itching     NAUSEA    Morphine And Related Itching     NAUSEA       Social History    reports that she has never smoked. She has never used smokeless tobacco. She reports that she does not currently use alcohol after a past usage of about 63.0 standard drinks of alcohol per week. She reports that she does not use drugs.      Family History   I have reviewed this patient's family history and updated it with pertinent information if needed.  Family History   Problem Relation Age of Onset    Substance Abuse Father     Cancer Father         throat and lung mets    Diabetes No family hx of     Coronary Artery Disease No family hx of     Cerebrovascular Disease No family hx of           Review of Systems   A comprehensive review of system was performed and is negative other than that noted in the HPI or here.     Physical Exam  "  Vital Signs with Ranges  Temp:  [97.2  F (36.2  C)-98.6  F (37  C)] 98.1  F (36.7  C)  Pulse:  [] 75  Resp:  [15-28] 19  BP: ()/(40-88) 101/59  SpO2:  [93 %-99 %] 95 %  Wt Readings from Last 4 Encounters:   04/13/24 75.8 kg (167 lb)   04/12/24 76.3 kg (168 lb 4.8 oz)   04/09/24 78.6 kg (173 lb 4.8 oz)   04/02/24 79.2 kg (174 lb 9.6 oz)     I/O last 3 completed shifts:  In: 30.4 [I.V.:30.4]  Out: -       Vitals: /59   Pulse 75   Temp 98.1  F (36.7  C) (Oral)   Resp 19   Ht 1.626 m (5' 4\")   Wt 75.8 kg (167 lb)   LMP  (LMP Unknown)   SpO2 95%   BMI 28.67 kg/m      Physical Exam:   General - Alert and oriented to time place and person in no acute distress  Eyes - No scleral icterus  HEENT - Neck supple, moist mucous membranes  Cardiovascular -heart sounds 1 and 2 normal.  2/6 systolic ejection systolic murmur heard in the aortic area.  Jugular venous pulse is not raised.  Carotids are normal with no bruits.  Extremities - There is 1-2+ bilateral lower extremity edema.  Mild erythema of both lower extremities.  Respiratory -chest is clear to percussion auscultation.  Skin - No pallor or cyanosis  Gastrointestinal - Non tender and non distended without rebound or guarding  Psych - Appropriate affect   Neurological - No gross motor neurological focal deficits    No lab results found in last 7 days.    Invalid input(s): \"TROPONINIES\"    Recent Labs   Lab 04/13/24  0607 04/12/24  1754   WBC 6.8 6.6   HGB 11.2* 12.5   * 103*    275    137   POTASSIUM 4.0 4.2   CHLORIDE 101 98   CO2 29 26   BUN 15.3 14.5   CR 1.17* 1.17*   GFRESTIMATED 47* 47*   ANIONGAP 9 13   BIRGIT 8.9 9.2   GLC 96 91   ALBUMIN  --  3.8   PROTTOTAL  --  6.9   BILITOTAL  --  0.5   ALKPHOS  --  64   ALT  --  15   AST  --  36     Recent Labs   Lab Test 09/25/23  1442 10/20/21  1251   CHOL 259* 218*   HDL 98 86   * 85   TRIG 140 234*     Recent Labs   Lab 04/13/24  0607 04/12/24  1754   WBC 6.8 6.6   HGB " "11.2* 12.5   HCT 34.0* 37.2   * 103*    275     No results for input(s): \"PH\", \"PHV\", \"PO2\", \"PO2V\", \"SAT\", \"PCO2\", \"PCO2V\", \"HCO3\", \"HCO3V\" in the last 168 hours.  Recent Labs   Lab 04/12/24  1754   NTBNPI 600     No results for input(s): \"DD\" in the last 168 hours.  No results for input(s): \"SED\", \"CRP\" in the last 168 hours.  Recent Labs   Lab 04/13/24  0607 04/12/24  1754    275     No results for input(s): \"TSH\" in the last 168 hours.  No results for input(s): \"COLOR\", \"APPEARANCE\", \"URINEGLC\", \"URINEBILI\", \"URINEKETONE\", \"SG\", \"UBLD\", \"URINEPH\", \"PROTEIN\", \"UROBILINOGEN\", \"NITRITE\", \"LEUKEST\", \"RBCU\", \"WBCU\" in the last 168 hours.    Imaging:  Recent Results (from the past 48 hour(s))   XR Chest 2 Views    Narrative    EXAM: XR CHEST 2 VIEWS  LOCATION: [facility]  DATE: 4/12/2024    INDICATION: SOB, bilat leg swelling  COMPARISON: 08/10/2022      Impression    IMPRESSION: Heart size is enlarged but stable. Status post TAVR. No CHF, lobar consolidation or effusion. No acute bony abnormalities.   US Lower Extremity Venous Duplex Bilateral    Narrative    EXAM: US LOWER EXTREMITY VENOUS DUPLEX BILATERAL  LOCATION: [facility]  DATE: 4/12/2024    INDICATION: leg edema, eval for DVT  COMPARISON: None.  TECHNIQUE: Venous Duplex ultrasound of bilateral lower extremities with and without compression, augmentation and duplex. Color flow and spectral Doppler with waveform analysis performed.    FINDINGS: Exam includes the common femoral, femoral, popliteal veins as well as segmentally visualized deep calf veins and greater saphenous vein.     RIGHT: No deep vein thrombosis. No superficial thrombophlebitis. No popliteal cyst.    LEFT: No deep vein thrombosis. No superficial thrombophlebitis. No popliteal cyst.      Impression    IMPRESSION:  1.  No deep venous thrombosis in the bilateral lower extremities.       Echo:  No results found for this or " "any previous visit (from the past 4320 hour(s)).    Clinically Significant Risk Factors Present on Admission                # Drug Induced Platelet Defect: home medication list includes an antiplatelet medication   # Hypertension: Noted on problem list  # Chronic heart failure with preserved ejection fraction: heart failure noted on problem list and last echo with EF >50%     # Overweight: Estimated body mass index is 28.67 kg/m  as calculated from the following:    Height as of this encounter: 1.626 m (5' 4\").    Weight as of this encounter: 75.8 kg (167 lb).                     Fluid overload, unspecified            CKD POA List: Stage 3a (GFR 45-59)                                "

## 2024-04-13 NOTE — PLAN OF CARE
Goal Outcome Evaluation:      Plan of Care Reviewed With: patient, child        Heart Failure Care Map  GOALS TO BE MET BEFORE DISCHARGE:    1. Decrease congestion and/or edema with diuretic therapy to achieve near optimal volume status.     Dyspnea improved: Yes, satisfactory for discharge.   Edema improved: No, further care required to meet this goal. Please explain LE +1 R leg red/warm, negative US for DVT        Last 24 hour I/O:   Intake/Output Summary (Last 24 hours) at 4/13/2024 1716  Last data filed at 4/13/2024 1000  Gross per 24 hour   Intake 330.4 ml   Output --   Net 330.4 ml           Net I/O and Weights since admission:   03/14 2300 - 04/13 2259  In: 330.4 [P.O.:300; I.V.:30.4]  Out: -   Net: 330.4     Vitals:    04/12/24 1739 04/13/24 0200 04/13/24 0625   Weight: 79.4 kg (175 lb) 76 kg (167 lb 9.6 oz) 75.8 kg (167 lb)       2.  O2 sats > 90% on room air, or at prior home O2 therapy level.      Able to wean O2 this shift to keep sats above 90%?: Yes, satisfactory for discharge.   Does patient use Home O2? No          Current oxygenation status:   SpO2: 96 %     O2 Device: None (Room air),      3.  Tolerates ambulation and mobility near baseline.     Ambulation: Yes, satisfactory for discharge.   Times patient ambulated with staff this shift: 3      A+ox4. Baseline tremor in hands. Indep in room. Denies CP or SOB. Does have some GRULLON. VSS on RA. Cardiolgy unclear if this is CHF, plan is angio on Monday. Heparin gtt infusing. SR.Echo unable to be done today, pt was in MRI, to be done tomorrow.       Please review the Heart Failure Care Map for additional HF goal outcomes.    Sara Brannon, RN  4/13/2024

## 2024-04-13 NOTE — ED NOTES
"[facility]  ED Nurse Handoff Report    ED Chief complaint: Shortness of Breath      ED Diagnosis:   Final diagnoses:   Hypotension, unspecified hypotension type   Exertional dyspnea   Elevated troponin   History of transcatheter aortic valve replacement (TAVR)   Leg swelling       Code Status: Admitting MD to address     Allergies:   Allergies   Allergen Reactions    Bactrim [Sulfamethoxazole-Trimethoprim] Hives    Codeine Nausea and Itching    Morphine Itching     NAUSEA    Morphine And Related Itching     NAUSEA       Patient Story: Pt comes in with increased SOB, chest pressure, leg swelling, and weakness.   Focused Assessment:  SOB with exertion and at rest, chest pressure but no pain, VSS, trop elevated- hep started    Treatments and/or interventions provided: See chart   Patient's response to treatments and/or interventions: in progress     To be done/followed up on inpatient unit:  See chart     Does this patient have any cognitive concerns?:  none    Activity level - Baseline/Home:  Independent  Activity Level - Current:   Independent    Patient's Preferred language: English   Needed?: No    Isolation: None  Infection: Not Applicable  Patient tested for COVID 19 prior to admission: NO  Bariatric?: No    Vital Signs:   Vitals:    04/12/24 1739 04/12/24 1951   BP: 108/61    BP Location: Right arm    Pulse: 80 79   Resp: 20 15   Temp: 98.2  F (36.8  C)    TempSrc: Temporal    SpO2: 97% 97%   Weight: 79.4 kg (175 lb)    Height: 1.626 m (5' 4\")        Cardiac Rhythm: NSR    Was the PSS-3 completed:   Yes  OBS brochure/video discussed/provided to patient/family: N/A               For the majority of the shift this patient's behavior was Green.     ED NURSE PHONE NUMBER: [phone]         "

## 2024-04-13 NOTE — PLAN OF CARE
VS:  Stable  Assist by:   SB      Cardiac: RRR; CP free overnight; Murmur audible  Neuro: A&Ox4  CMS: Denies paresthesias; 1+Edema to both LE; R>L, Erythematous  Respi: diminished bases to auscultation; on RA   : Continent; voiding freely   GI: Abdo soft, non-tender; BS audible;     Strip/Tele: SR; Long QT    Pressure areas/Skin:    - PA's intact        LDA's:     PIVC to L  arm  ongoing Heparin gtt   Next Xa at:_1000h_    Plans:    >Hold PTA metoprolol and losartan given hypotension   > hold diuresis overnight. plan to initiate diuresis in a.m. may benefit from Lasix drip instead of bolus.   > Cardiology consult.   > Lymphedema consult   > TTE            Problem: Adult Inpatient Plan of Care  Goal: Absence of Hospital-Acquired Illness or Injury  Intervention: Identify and Manage Fall Risk  Recent Flowsheet Documentation  Taken 4/13/2024 0154 by Jahaira Dorantes, RN  Safety Promotion/Fall Prevention:   activity supervised   safety round/check completed   room organization consistent   room door open   lighting adjusted   nonskid shoes/slippers when out of bed  Intervention: Prevent Skin Injury  Recent Flowsheet Documentation  Taken 4/13/2024 0154 by Jahaira Dorantes, RN  Body Position: position changed independently  Goal: Readiness for Transition of Care  Intervention: Mutually Develop Transition Plan  Recent Flowsheet Documentation  Taken 4/13/2024 0100 by Jahaira Dorantes, RN  Equipment Currently Used at Home: none     Problem: Risk for Delirium  Goal: Improved Behavioral Control  Intervention: Minimize Safety Risk  Recent Flowsheet Documentation  Taken 4/13/2024 0154 by Jahaira Dorantes, RN  Communication Enhancement Strategies: call light answered in person

## 2024-04-13 NOTE — H&P
[facility]    History and Physical - Hospitalist Service       Date of Admission:  4/12/2024    Assessment & Plan      Kavitha Headley is a 78 year old female with medical history significant for severe aortic stenosis s/p TAVR 08/2022, chronic diastolic CHF, dilated ascending aorta, paroxysmal SVT, HTN, HL, CKD stage III, chronic anemia, morbid obesity s/p gastric bypass, alcoholic liver disease, depression and anxiety, insomnia was sent to the ER from the clinic for evaluation of exertional dyspnea, leg edema and hypotension.  Patient is admitted on 4/12/2024 for further management.     NSTEMI  Suspected acute on chronic diastolic CHF  Abnormal stress test 4/2/2023  Diastolic CHF per chart chart review, reports chronic dyspnea, worsened for the last 10 days with exertional dyspnea and chest tightness.  Troponin mildly elevated.  Twelve-lead EKG without ischemic changes.  Chest x-ray without pulmonary edema.  proBNP only 600.  Lactic acid negative.  Stress test as outpatient 4/2 showed mild ischemia in the apical segment of the LV, probably abnormal, and hyperdynamic LV function.  -Admit to inpatient  -ASA 81 Mg daily  -heparin drip  -Hold PTA metoprolol and losartan given hypotension  -Asymptomatic at rest, hold diuresis overnight.  If BP stable, plan to initiate diuresis in a.m. may benefit from Lasix drip instead of bolus.  -Cardiology consult. Will keep her NPO after MN pending cardiology evaluation tomorrow.  -TTE, telemetry, FLP, serial troponin, HbA1c.  -Resume PTA statin.  -Fluid restriction, Daily weight, intake and output.    Severe aortic stenosis s/p TAVR 8/9/2022  Hypertension, now with hypotension  Paroxysmal SVT  Hyperlipidemia  Ascending aortic dilation, 4.1 cm, stable  Loud systolic murmur noted on exam.  -Hold losartan and metoprolol as above  -Resume statin  -Follows up with Dr. Montelongo    CKD stage III  Creatinine is 1.1 which seems to be her baseline.       Bilateral leg  "edema  Patient with chronic diastolic CHF.  Lasix was recently started.  There is faint redness but not typically concerning for cellulitis.  Denies pain but reports discomfort and itching.  -Diuresis from a.m. if blood pressure stable overnight  -US legs to evaluate for DVT  -Lymphedema consult  -Monitor for worsening erythema/sign of cellulitis  -Leg elevation    Liver disease, presumed alcohol related  Limited abdominal ultrasound 11/2023 showed coarsened echotexture suggesting underlying fibrosis.  Patient is s/p cholecystectomy.  Patient with morbid obesity s/p gastric bypass.  Unclear if above represents liver disease due to progression of steatohepatitis related to obesity or alcoholic liver disease.  -Platelet counts normal  -Check INR  -Reports she stopped drinking alcohol since February 1, 2024.     Generalized anxiety disorder  Major depressive disorder  Insomnia  Chronic pain syndrome  Resume PTA gabapentin, trazodone and Ambien when verified.       Diet:  cardiac, npo after MN  DVT Prophylaxis: heparin drip  Montelongo Catheter: Not present  Lines: None     Cardiac Monitoring: None  Code Status:  Full code     Clinically Significant Risk Factors Present on Admission                # Drug Induced Platelet Defect: home medication list includes an antiplatelet medication   # Hypertension: Noted on problem list  # Chronic heart failure with preserved ejection fraction: heart failure noted on problem list and last echo with EF >50%     # Obesity: Estimated body mass index is 30.04 kg/m  as calculated from the following:    Height as of this encounter: 1.626 m (5' 4\").    Weight as of this encounter: 79.4 kg (175 lb).              Disposition Plan     Medically Ready for Discharge: Anticipated in 2-4 Days           Lina Caldera MD  Hospitalist Service  [facility]  Securely message with [employer] (more info)  Text page via [facility] Paging/Directory "     ______________________________________________________________________    Chief Complaint   Exertional shortness of breath  Bilateral leg edema  Hypertension    History is obtained from the patient, chart review, discussion with ER MD    History of Present Illness     Kavitha Headley is a 78 year old female with medical history significant for severe aortic stenosis s/p TAVR 08/2022, chronic diastolic CHF, dilated ascending aorta, paroxysmal SVT, HTN, HL, CKD stage III, chronic anemia, morbid obesity s/p gastric bypass, alcoholic liver disease, depression and anxiety, insomnia was sent to the ER from the clinic for evaluation of exertional dyspnea, leg edema and hypotension.     Patient reports chronic shortness of breath.  Per history has diastolic CHF.  Noted increased leg swelling and associated exertional dyspnea and chest tightness for the last 10 days.  Patient was evaluated in the clinic 3 days ago, started on Lasix 40 Mg p.o. daily with potassium supplement.  Blood pressure in the clinic was 98/68.  Given this, she was advised to hold Lopressor and losartan.  Patient had a follow-up today in the clinic.  She feels leg edema has not changed with Lasix despite noticing increased urine output with Lasix.  Breathing remains unchanged.  She also feels itching on both legs, noted mild redness in both legs but denies pain.  Feels legs are heavy/with discomfort.  She followed up in the clinic today.  She was noted to be hypotensive with blood pressure of 82/40.  Patient denies dizziness or palpitation.  Given above, she was sent to the ER for further evaluation.    Patient denies chest pain, cough, orthopnea or PND, runny nose.  Patient reports she stopped drinking alcohol since February 1, 2024.    In ER, patient was evaluated by Dr. Do.  Vitals WNL.  BMP showed baseline creatinine of 1.05, anion gap of 16 but normal bicarb.  Troponin was detectable at 17, proBNP negative.  CBC normal other than  macrocytic RBCs.  CXR without pulmonary congestion.  Twelve-lead EKG showed sinus rhythm, no ischemic changes.  Given symptoms and abnormal stress test, heparin drip started and patient is being admitted for further management      Past Medical History    Past Medical History:   Diagnosis Date    Alcohol abuse     Alcohol dependence in remission (H)     Annual physical exam     Anxiety disorder     Ascending aorta dilatation (H24)     Bariatric surgery status 1996?    gastric bypass, [facility] and    Benign hypertension     Chronic insomnia     Chronic pain syndrome     Chronic back and neck pain, chronic pain due to osteoarthritis multiple joints    Coronary artery disease involving native coronary artery of native heart without angina pectoris 10/16/2018    Minimal coronary artery disease on coronary angiogram in 2015.     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    Major depressive disorder, single episode, severe, without mention of psychotic behavior     Mild recurrent major depression (H24)     Mixed hyperlipidemia     Need for prophylactic vaccination and inoculation against influenza     Non-traumatic rhabdomyolysis     Obesity, Class I, BMI 30.0-34.9 (see actual BMI) 09/18/2023    Pelvic relaxation disorder     Surgical intervention for cystocele/rectocele 3,11/2012    Personal history of urinary calculi 06/2006    left ureteral stone,lithotripsy    Psoriasis     Spinal stenosis     Stage III chronic kidney disease (H) 2005       Past Surgical History   Past Surgical History:   Procedure Laterality Date    APPENDECTOMY  3/2004    incidental    ARTHRODESIS TOE(S) Right 1/31/2020    Procedure: RIGHT FIRST METATARSAL PHALANGEAL JOINT ARTHRODESIS;  Surgeon:  Steven Reyes MD;  Location:  OR    C MEDIASTINOSCOPY W OR WO BIOPSY  2/2008    Videomediastinoscopy and, for mediastinal adenopathy -reactive lymphoid hyperplasia    CARPAL TUNNEL RELEASE RT/LT  10/2010    Carpometacarpal excisional arthroplasty with a fascial autograft and APL suspension sling (09213). 2. Left thumb metacarpophalangeal joint fusion with autologous bone graft (53159). 3. Left endoscopic carpal tunnel release     CHOLECYSTECTOMY, LAPOROSCOPIC  11/2010    Cholecystectomy, Laparoscopic    COLONOSCOPY N/A 9/8/2016    Procedure: COMBINED COLONOSCOPY, SINGLE OR MULTIPLE BIOPSY/POLYPECTOMY BY BIOPSY;  Surgeon: Moe Barlow MD;  Location:  GI    CV CORONARY ANGIOGRAM N/A 6/20/2022    Procedure: Coronary Angiogram;  Surgeon: Nora Parker MD;  Location:  HEART CARDIAC CATH LAB    CV PCI N/A 6/20/2022    Procedure: Percutaneous Coronary Intervention;  Surgeon: Nora Parker MD;  Location:  HEART CARDIAC CATH LAB    CV RIGHT HEART CATH MEASUREMENTS RECORDED N/A 6/20/2022    Procedure: Right Heart Catheterization;  Surgeon: Nora Parker MD;  Location:  HEART CARDIAC CATH LAB    CV TRANSCATHETER AORTIC VALVE REPLACEMENT-FEMORAL APPROACH N/A 8/9/2022    Procedure: Transcatheter Aortic Valve Replacement-Femoral Approach;  Surgeon: Nora Parker MD;  Location:  HEART CARDIAC CATH LAB    CYSTOCELE REPAIR  11/2012    davinci laparoscopic sacrocolpopexy, enterocele repair, lysis of adhesions, placement of retropubic mid urethral sling, cystoscopy    CYSTOSCOPY, LITHOTRIPSY, COMBINED  6/2006    Left extracorporeal shock wave lithotripsy, cystoscopy, left ureteral stent placement.    CYSTOSCOPY, REMOVE STENT(S), COMBINED  7/2006    Cystoscopy, removal of left ureteral stent, retrograde pyelography, flexible and rigid ureteroscopy and holmium laser lithotripsy, basket removal of stone fragments, ureteral stent placement.     ENDOSCOPIC ULTRASOUND UPPER GASTROINTESTINAL TRACT (GI) N/A  6/12/2017    Procedure: ENDOSCOPIC ULTRASOUND, ESOPHAGOSCOPY / UPPER GASTROINTESTINAL TRACT (GI);  ENDOSCOPIC ULTRASOUND, ESOPHAGOSCOPY / UPPER GASTROINTESTINAL TRACT (GI);  Surgeon: Parth Graham MD;  Location:  GI    HERNIA REPAIR  4/2012    bilateral augmentation mastopexy, ventral hernia repair, and medial thigh liposuction on 04/06/2012.     HYSTERECTOMY VAGINAL, BILATERAL SALPINGO-OOPHERECTOMY, COMBINED  1998    due to myoma and bleeding    JOINT REPLACEMENT, HIP RT/LT  4/2004    right total hip arthroplasty    LAPAROTOMY, LYSIS ADHESIONS, COMBINED  3/2004    lysis adhesions, ventral hernia repair, appendectomy incidentally    LYMPH NODE BIOPSY  4/2008    right axillary, reactive follicular and paracortical hyperplasia.    MAMMOPLASTY AUGMENTATION BILATERAL  4/2012    REPAIR HAMMER TOE Right 1/31/2020    Procedure: WITH SECOND AND THIRD CLAW TOE RECONSTRUCTION;  Surgeon: Steven Reyes MD;  Location:  OR    REVISE RECONSTRUCTED BREAST  6/7/2012    Left breast capsulotomy.     ZZC GASTRIC BYPASS,OBESE<100CM ARIANNA-EN-Y  1996    Z REPAIR OF RECTOCELE  3/2012    [facility] TOTAL KNEE ARTHROPLASTY  12/2005    left        Prior to Admission Medications   Prior to Admission Medications   Prescriptions Last Dose Informant Patient Reported? Taking?   Multiple Vitamins-Minerals (CENTRUM SILVER) per tablet  Self Yes No   Sig: Take 1 tablet by mouth daily   acetaminophen (TYLENOL) 500 MG tablet  Self Yes No   Sig: Take 1,000 mg by mouth 2 times daily as needed for pain   aspirin (ASA) 81 MG EC tablet   Yes No   Sig: Take 81 mg by mouth daily   bisacodyl (DULCOLAX) 5 MG EC tablet  Self Yes No   Sig: Take 5 mg by mouth daily as needed for constipation   cefadroxil (DURICEF) 500 MG capsule   No No   Sig: Take 1 capsule (500 mg) by mouth 2 times daily   citalopram (CELEXA) 20 MG tablet   No No   Sig: Take 1 tablet (20 mg) by mouth daily   folic acid (FOLVITE) 1 MG tablet  Self Yes No   Sig: Take 1 mg by mouth daily    furosemide (LASIX) 20 MG tablet   No No   Sig: Take 1 tablet (20 mg) by mouth daily   gabapentin (NEURONTIN) 300 MG capsule   No No   Sig: Take 1 capsule (300 mg) by mouth every morning   gabapentin (NEURONTIN) 600 MG tablet   No No   Sig: Take 1 tablet (600 mg) by mouth at bedtime   losartan (COZAAR) 50 MG tablet   No No   Sig: Take 1 tablet (50 mg) by mouth daily   metoprolol tartrate (LOPRESSOR) 25 MG tablet   No No   Sig: Take 1.5 tablets (37.5 mg) by mouth 2 times daily   naloxone (NARCAN) 4 MG/0.1ML nasal spray   Yes No   Sig: Spray 4 mg into one nostril alternating nostrils once as needed   polyethylene glycol (MIRALAX) 17 GM/Dose powder  Self Yes No   Sig: Take 17 g by mouth 2 times daily as needed for constipation   potassium chloride suleman ER (KLOR-CON M20) 20 MEQ CR tablet   No No   Sig: Take 1 tablet (20 mEq) by mouth daily   rosuvastatin (CRESTOR) 20 MG tablet   No No   Sig: Take 1 tablet (20 mg) by mouth daily   tiZANidine (ZANAFLEX) 4 MG capsule   Yes No   Sig: Take 4 mg by mouth 3 times daily as needed for muscle spasms   traZODone (DESYREL) 100 MG tablet   No No   Sig: TAKE 1 TABLET BY MOUTH AT BEDTIME   valACYclovir (VALTREX) 1000 mg tablet   No No   Sig: Take 1 tablet (1,000 mg) by mouth as needed (onset of cold sore)   zolpidem ER (AMBIEN CR) 6.25 MG CR tablet   Yes No   Sig: Take 6.25 mg by mouth At Bedtime      Facility-Administered Medications: None        Review of Systems    The 10 point Review of Systems is negative other than noted in the HPI or here.      Social History   I have reviewed this patient's social history and updated it with pertinent information if needed.  Social History     Tobacco Use    Smoking status: Never    Smokeless tobacco: Never   Vaping Use    Vaping status: Never Used   Substance Use Topics    Alcohol use: Not Currently     Alcohol/week: 63.0 standard drinks of alcohol     Types: 63 Standard drinks or equivalent per week    Drug use: No         Family History    I have reviewed this patient's family history and updated it with pertinent information if needed.  Family History   Problem Relation Age of Onset    Substance Abuse Father     Cancer Father         throat and lung mets    Diabetes No family hx of     Coronary Artery Disease No family hx of     Cerebrovascular Disease No family hx of          Allergies   Allergies   Allergen Reactions    Bactrim [Sulfamethoxazole-Trimethoprim] Hives    Codeine Nausea and Itching    Morphine Itching     NAUSEA    Morphine And Related Itching     NAUSEA        Physical Exam   Vital Signs: Temp: 98.2  F (36.8  C) Temp src: Temporal BP: 108/61 Pulse: 80   Resp: 20 SpO2: 97 % O2 Device: None (Room air)    Weight: 175 lbs 0 oz    General: AAOx3, appears comfortable.  HEENT: PERRLA EOMI. Mucosa moist.   Lungs: Bilateral equal air entry. Clear to auscultation, normal work of breathing.   CVS: S1S2 regular, systolic murmur 3/6.  Not tachycardic.  Abdomen: Soft, NT, ND. BS heard.  MSK: Bilateral leg edema up to knee.  There is very faint erythema of both lower extremities, not consistent with cellulitis.  Neuro: AAOX3. CN 2-12 normal. Strength symmetrical.  Skin: No rash. Faint erythema on legs, no rash      Medical Decision Making       75 MINUTES SPENT BY ME on the date of service doing chart review, history, exam, documentation & further activities per the note.      Data     I have personally reviewed the following data over the past 24 hrs:    6.6  \   12.5   / 275     137 98 14.5 /  91   4.2 26 1.17 (H) \     ALT: 15 AST: 36 AP: 64 TBILI: 0.5   ALB: 3.8 TOT PROTEIN: 6.9 LIPASE: N/A     Trop: 27 (H) BNP: 600     Procal: N/A CRP: N/A Lactic Acid: 1.8         Imaging results reviewed over the past 24 hrs:   Recent Results (from the past 24 hour(s))   XR Chest 2 Views    Narrative    EXAM: XR CHEST 2 VIEWS  LOCATION: [facility]  DATE: 4/12/2024    INDICATION: SOB, bilat leg swelling  COMPARISON: 08/10/2022       Impression    IMPRESSION: Heart size is enlarged but stable. Status post TAVR. No CHF, lobar consolidation or effusion. No acute bony abnormalities.

## 2024-04-13 NOTE — PROVIDER NOTIFICATION
MD Notification    Notified Person: MD    Notified Person Name:  Aric    Notification Date/Time: 4/13/24 7304    Notification Interaction: vocera    Purpose of Notification: pt R leg is warm and red, pt thinks it looks worse.     Orders Received:    Comments:

## 2024-04-13 NOTE — PROGRESS NOTES
RECEIVING UNIT ED HANDOFF REVIEW    ED Nurse Handoff Report was reviewed by: Jahaira Dorantes RN on April 12, 2024 at 11:10 PM

## 2024-04-13 NOTE — PHARMACY-ADMISSION MEDICATION HISTORY
Pharmacist Admission Medication History    Admission medication history is complete. The information provided in this note is only as accurate as the sources available at the time of the update.    Information Source(s): Patient and CareEverywhere/SureScripts via in-person    Pertinent Information:     Changes made to PTA medication list:  Added: None  Deleted: None  Changed: Ambien CR 6.25mg --> 12.5 mg    Allergies reviewed with patient and updates made in EHR: no    Medication History Completed By: Akil Aliheyder, [facility] 4/12/2024 8:40 PM    PTA Med List   Medication Sig Last Dose    acetaminophen (TYLENOL) 500 MG tablet Take 1,000 mg by mouth 2 times daily as needed for pain prn    aspirin (ASA) 81 MG EC tablet Take 81 mg by mouth daily 4/12/2024    bisacodyl (DULCOLAX) 5 MG EC tablet Take 5 mg by mouth daily as needed for constipation prn    citalopram (CELEXA) 20 MG tablet Take 1 tablet (20 mg) by mouth daily 4/11/2024    folic acid (FOLVITE) 1 MG tablet Take 1 mg by mouth daily 4/11/2024    furosemide (LASIX) 20 MG tablet Take 1 tablet (20 mg) by mouth daily 4/11/2024    gabapentin (NEURONTIN) 300 MG capsule Take 1 capsule (300 mg) by mouth every morning 4/12/2024    gabapentin (NEURONTIN) 600 MG tablet Take 1 tablet (600 mg) by mouth at bedtime 4/11/2024    losartan (COZAAR) 50 MG tablet Take 1 tablet (50 mg) by mouth daily 4/9/2024    metoprolol tartrate (LOPRESSOR) 25 MG tablet Take 1.5 tablets (37.5 mg) by mouth 2 times daily 4/9/2024    Multiple Vitamins-Minerals (CENTRUM SILVER) per tablet Take 1 tablet by mouth daily     polyethylene glycol (MIRALAX) 17 GM/Dose powder Take 17 g by mouth 2 times daily as needed for constipation     potassium chloride suleman ER (KLOR-CON M20) 20 MEQ CR tablet Take 1 tablet (20 mEq) by mouth daily Past Week    rosuvastatin (CRESTOR) 20 MG tablet Take 1 tablet (20 mg) by mouth daily 4/11/2024    tiZANidine (ZANAFLEX) 4 MG capsule Take 4 mg by mouth 3 times daily as needed for  muscle spasms     traZODone (DESYREL) 100 MG tablet TAKE 1 TABLET BY MOUTH AT BEDTIME 4/11/2024    valACYclovir (VALTREX) 1000 mg tablet Take 1 tablet (1,000 mg) by mouth as needed (onset of cold sore) prn    zolpidem ER (AMBIEN CR) 12.5 MG CR tablet Take 12.5 mg by mouth at bedtime 4/11/2024   .

## 2024-04-14 NOTE — PROGRESS NOTES
[facility] Cardiology Progress Note            Interval History:   Dyspnea on exertion and chest pressure on exertion over the last 10 days.  Some swelling of the lower extremities with redness.  No evidence of congestive heart failure based upon chest x-ray, BNP and ejection fraction is normal.  Symptoms are more suggestive of new onset angina pectoris and the patient has a mild troponin rise consistent with a non-ST elevation myocardial infarct.  No regional wall motion abnormalities on the echocardiogram.  Mild renal dysfunction.  Renal function is stable however.  Status post TAVR in 2021.  Minimal coronary artery disease in 2021.  Equivocal nuclear stress test last year.  Possible evidence of apical ischemia but breast attenuation artifact.              Medications:     Current Facility-Administered Medications   Medication Dose Route Frequency Provider Last Rate Last Admin    aspirin (ASA) chewable tablet 81 mg  81 mg Oral Daily Lina Caldera MD   81 mg at 04/14/24 1009    [Held by provider] aspirin EC tablet 81 mg  81 mg Oral Daily Latrell Corbett MD        [Held by provider] cefadroxil (DURICEF) capsule 500 mg  500 mg Oral BID Latrell Corbett MD        citalopram (celeXA) tablet 20 mg  20 mg Oral Daily Latrell Corbett MD   20 mg at 04/14/24 1009    folic acid (FOLVITE) tablet 1 mg  1 mg Oral Daily Latrell Corbett MD   1 mg at 04/14/24 1009    furosemide (LASIX) tablet 20 mg  20 mg Oral Daily Latrell Corbett MD   20 mg at 04/14/24 1010    gabapentin (NEURONTIN) capsule 300 mg  300 mg Oral QAM Latrell Corbett MD   300 mg at 04/14/24 1010    gabapentin (NEURONTIN) tablet 600 mg  600 mg Oral At Bedtime Latrell Corbett MD   600 mg at 04/13/24 2145    [Held by provider] losartan (COZAAR) tablet 50 mg  50 mg Oral Daily Latrell Corbett MD        metoprolol tartrate (LOPRESSOR) half-tab 37.5 mg  37.5 mg Oral BID Latrell Corbett MD   37.5 mg at 04/14/24 1010    [Held by  "provider] multivitamin w/minerals (THERA-VIT-M) tablet 1 tablet  1 tablet Oral Daily Latrell Corbett MD        rosuvastatin (CRESTOR) tablet 20 mg  20 mg Oral Daily Latrell Corbett MD   20 mg at 04/14/24 1010    sodium chloride (PF) 0.9% PF flush 3 mL  3 mL Intracatheter Q8H Lina Caldera MD   3 mL at 04/13/24 1328    traZODone (DESYREL) tablet 100 mg  100 mg Oral At Bedtime Latrell Corbett MD   100 mg at 04/13/24 2145    [Held by provider] valACYclovir (VALTREX) tablet 1,000 mg  1,000 mg Oral Q12H [facility] (08/20) Latrell Corbett MD                   Physical Exam:   Blood pressure 108/62, pulse 60, temperature 98.2  F (36.8  C), temperature source Oral, resp. rate 18, height 1.626 m (5' 4\"), weight 75.9 kg (167 lb 4.8 oz), SpO2 96%, not currently breastfeeding.  Rhythm: Sinus rhythm   Constitutional:   awake, alert, cooperative, no apparent distress, and appears stated age     Neck:   no jugular venous distension and no carotid bruits     Lungs:   clear to auscultation     Cardiovascular:   regular rate and rhythm, normal S1 and S2, S4 present, murmurs include systolic murmur II/VI located at right upper sternal border and left upper sternal border without radiation, redness over right lower extremity with trace edema.  Mild redness over left lower extremity but no edema present.            Data:        Lab Results   Component Value Date     04/13/2024     04/12/2024     02/14/2024     05/12/2021     03/27/2021     03/25/2021    Lab Results   Component Value Date    CHLORIDE 101 04/13/2024    CHLORIDE 98 04/12/2024    CHLORIDE 98 02/14/2024    CHLORIDE 101 09/21/2022    CHLORIDE 104 08/11/2022    CHLORIDE 106 08/10/2022    CHLORIDE 107 05/12/2021    CHLORIDE 106 03/27/2021    CHLORIDE 102 03/25/2021    Lab Results   Component Value Date    BUN 15.3 04/13/2024    BUN 14.5 04/12/2024    BUN 8.8 02/14/2024    BUN 18 09/21/2022    BUN 13 08/11/2022    BUN 18 " 08/10/2022    BUN 15 05/12/2021    BUN 21 03/27/2021    BUN 21 03/25/2021      Lab Results   Component Value Date    POTASSIUM 4.0 04/13/2024    POTASSIUM 4.2 04/12/2024    POTASSIUM 3.3 02/14/2024    POTASSIUM 4.3 09/21/2022    POTASSIUM 4.1 08/11/2022    POTASSIUM 4.1 08/10/2022    POTASSIUM 4.0 05/12/2021    POTASSIUM 4.6 04/18/2021    POTASSIUM 3.8 03/27/2021    Lab Results   Component Value Date    CO2 29 04/13/2024    CO2 26 04/12/2024    CO2 23 02/14/2024    CO2 30 09/21/2022    CO2 28 08/11/2022    CO2 27 08/10/2022    CO2 30 05/12/2021    CO2 27 03/27/2021    CO2 33 03/25/2021    Lab Results   Component Value Date    CR 1.17 04/13/2024    CR 1.17 04/12/2024    CR 1.08 02/14/2024    CR 1.00 05/12/2021    CR 1.17 04/18/2021    CR 0.94 03/27/2021        Lab Results   Component Value Date    HGB 11.2 (L) 04/13/2024    HGB 12.5 04/12/2024    HGB 11.5 (L) 11/06/2023     Lab Results   Component Value Date     04/13/2024     04/12/2024     11/06/2023            I have reviewed recent imaging studies.         Assessment and Plan:   Assessment:   Problem List  Exertional chest pressure and exertional shortness of breath with small troponin rise suggestive of possible unstable angina pectoris with non-ST elevation myocardial infarct.    History of transcatheter aortic valve replacement (TAVR) in 2021 .  Some redness and mild edema in the right lower extremity.  Cause is unclear.  Not febrile and no leukocytosis.  Internal medicine following.    * No resolved hospital problems. *       Plan:   1.  Discussed at length possibilities of shortness of breath and chest discomfort on exertion this patient with a prior TAVR and minimal coronary artery disease in 2021.  Given the small troponin rise, the equivocal nuclear stress test last year and the symptoms which are quite compelling for new onset angina pectoris and the small troponin rise I believe the coronary angiography is indicated in this patient.   I explained the risks and benefits of the procedure to the patient.  The risk of stroke, heart attack, death, bleeding, bruising, hematoma formation, vascular damage, limb loss, dye allergy, dye nephropathy, the risk associated with conscious sedation including aspiration and intubation and the risk associated with blood transfusions.  I also explained the special risks associated with coronary interventions including the increased risk of death, myocardial infarction and emergency bypass surgery.  The patient told me that she understood why we were doing the procedure.  She told me that she understood how the procedure is performed.  She told me she understood the alternatives to the procedure and finally she told me that he wished to proceed.  I specifically told her that she was at a higher risk of contrast-induced nephropathy because her creatinine and GFR is abnormal but that we would resuscitate with fluid prior to the procedure.  2.  Internal medicine will be following the erythema in the lower extremity.  1 more he would be cellulitis and it does appear that the redness has moved upward slightly and is more edematous.  Unlikely to be a DVT.       Attestation:  I have reviewed today's vital signs, notes, medications, labs and imaging.  Care coordination / counseling time: 35 minutes  Total time: 40 minutes         Ferny Yanez MD, MD 4/14/2024  11:16 AM

## 2024-04-14 NOTE — PROGRESS NOTES
[facility]    Hospitalist Progress Note    Brief Summary:  Kavitha Headley is a 78 year old female with medical history significant for severe aortic stenosis s/p TAVR 08/2022, chronic diastolic CHF, dilated ascending aorta, paroxysmal SVT, HTN, HL, CKD stage III, chronic anemia, morbid obesity s/p gastric bypass, alcoholic liver disease, depression and anxiety, insomnia was sent to the ER from the clinic for evaluation of exertional dyspnea, leg edema and hypotension.  Patient is admitted on 4/12/2024 for further management.     Assessment & Plan        NSTEMI  Suspected acute on chronic diastolic CHF  Abnormal stress test 4/2/2024  This is a 78-year-old female with multiple comorbidities as above, presented with exertional dyspnea which is genetically well-known, chest heaviness with activity, mildly elevated troponin but serial troponin flat    Twelve-lead EKG without ischemic changes.  Chest x-ray without pulmonary edema.  proBNP only 600.  Lactic acid negative.  Stress test as outpatient 4/2 showed mild ischemia in the apical segment of the LV, probably abnormal, and hyperdynamic LV function.    She is on IV heparin, aspirin.  Cardiology is consulted.  Cardiologist has evaluate the patient recommend coronary angiogram which I agree is scheduled for Monday.  Continue with IV heparin for now.  Blood pressure is now stable, did resume PTA metoprolol on 4/13/2020 but continue to hold losartan at this time  Continue Lasix 20 mg daily.  She has mild lower extremity edema right more than left, no sign of infection or cellulitis  Echocardiogram shows EF of 55 to 60%, no regional wall motion abnormality, bioprosthetic aortic valve present ascending aorta dilatation about 40 mm    -Resume PTA statin.  -Fluid restriction, Daily weight, intake and output.  Patient will be scheduled to have coronary angiogram tomorrow 4/15/2024     Severe aortic stenosis s/p TAVR 8/9/2022  Hypertension, now with  hypotension:  Paroxysmal SVT  Hyperlipidemia  Ascending aortic dilation, 4.1 cm, stable  Resume metoprolol as appropriate stable.  Continue hold losartan at this time, will resume Lasix 20 mg daily  -Resume statin  Echocardiogram pending at this time     CKD stage III  Creatinine is 1.1 which seems to be her baseline.        Bilateral leg edema  Patient has trace left lower extremity and 1+ lower EXTR edema.  She has prior bilateral TKA as well.  Ultrasound bilateral lower extremity negative for DVT  Resume low-dose Lasix 2 mg daily now.  Will benefit from compression stockings as well       Liver disease, presumed alcohol related  Limited abdominal ultrasound 11/2023 showed coarsened echotexture suggesting underlying fibrosis.  Patient is s/p cholecystectomy.  Patient with morbid obesity s/p gastric bypass.  Unclear if above represents liver disease due to progression of steatohepatitis related to obesity or alcoholic liver disease.  -Platelet counts normal  -Reports she stopped drinking alcohol since February 1, 2024.  Albumin normal on admission     Generalized anxiety disorder  Major depressive disorder  Insomnia  Chronic pain syndrome  Resume PTA gabapentin, trazodone and Ambien when verified.      Neck pain with right upper extremity weakness  Patient does have history of neck pain, was seen at the TCU  MRI of the brain and cervical spine recommended.  MRI of the brain negative for any acute stroke  MRI of the cervical spine reviewed.  Follow-up with spine surgery as outpatient      DVT Prophylaxis: Heparin   Code Status: Full Code    Disposition: Expected discharge 1 to 2 days    Latrell Corbett MD, MD  Text Page  (7am - 6pm)    Interval History   Patient overall feeling better this morning, denies any chest pain shortness of breath no fever chills headache dizziness or lightheadedness at this time.    No other significant event overnight    -Data reviewed today: I reviewed all new labs and imaging results over  the last 24 hours. I personally reviewed no images or EKG's today.    Physical Exam   Temp: 97.9  F (36.6  C) Temp src: Oral BP: 113/62 Pulse: 60   Resp: 18 SpO2: 96 % O2 Device: None (Room air)    Vitals:    04/13/24 0200 04/13/24 0625 04/14/24 0500   Weight: 76 kg (167 lb 9.6 oz) 75.8 kg (167 lb) 75.9 kg (167 lb 4.8 oz)     Vital Signs with Ranges  Temp:  [97.5  F (36.4  C)-98.5  F (36.9  C)] 97.9  F (36.6  C)  Pulse:  [45-70] 60  Resp:  [18-22] 18  BP: (106-127)/(61-79) 113/62  SpO2:  [95 %-97 %] 96 %  I/O last 3 completed shifts:  In: 690 [P.O.:690]  Out: 400 [Urine:400]    Constitutional: awake, alert, cooperative, no apparent distress, and appears stated age  Eyes: Lids and lashes normal, pupils equal, round and reactive to light, extra ocular muscles intact, sclera clear, conjunctiva normal  Respiratory: No increased work of breathing, good air exchange, clear to auscultation bilaterally, no crackles or wheezing  Cardiovascular: Normal apical impulse, regular rate and rhythm, normal S1 and S2, no S3 or S4, and no murmur noted  GI: No scars, normal bowel sounds, soft, non-distended, non-tender, no masses palpated, no hepatosplenomegally  Musculoskeletal: 1+ right and trace left lower extremity pitting edema present  Neurologic: No focal deficit    Medications   Current Facility-Administered Medications   Medication Dose Route Frequency Provider Last Rate Last Admin    Continuing beta blocker from home medication list OR beta blocker order already placed during this visit   Does not apply DOES NOT GO TO Lina Gonsalez MD        Continuing statin from home medication list OR statin order already placed during this visit   Does not apply DOES NOT GO TO Lina Gonsalez MD        heparin infusion 25,000 units in D5W 250 mL ANTICOAGULANT  0-5,000 Units/hr Intravenous Continuous Lina Caldera MD 6 mL/hr at 04/14/24 1010 600 Units/hr at 04/14/24 1010    medication instruction   Does not apply  Continuous PRN Lina Caldera MD        Patient is already receiving anticoagulation with heparin, enoxaparin (LOVENOX), warfarin (COUMADIN)  or other anticoagulant medication   Does not apply Continuous PRN Lina Caldera MD        Reason ACE/ARB/ARNI order not selected   Other DOES NOT GO TO Lina Gonsalez MD         Current Facility-Administered Medications   Medication Dose Route Frequency Provider Last Rate Last Admin    aspirin (ASA) chewable tablet 81 mg  81 mg Oral Daily Lina Caldera MD   81 mg at 04/14/24 1009    [Held by provider] aspirin EC tablet 81 mg  81 mg Oral Daily Latrell Corbett MD        [Held by provider] cefadroxil (DURICEF) capsule 500 mg  500 mg Oral BID Latrell Corbett MD        citalopram (celeXA) tablet 20 mg  20 mg Oral Daily Latrell Corbett MD   20 mg at 04/14/24 1009    folic acid (FOLVITE) tablet 1 mg  1 mg Oral Daily Latrell Corbett MD   1 mg at 04/14/24 1009    furosemide (LASIX) tablet 20 mg  20 mg Oral Daily Latrell Corbett MD   20 mg at 04/14/24 1010    gabapentin (NEURONTIN) capsule 300 mg  300 mg Oral QAM Latrell Corbett MD   300 mg at 04/14/24 1010    gabapentin (NEURONTIN) tablet 600 mg  600 mg Oral At Bedtime Latrell Corbett MD   600 mg at 04/13/24 2145    [Held by provider] losartan (COZAAR) tablet 50 mg  50 mg Oral Daily Latrell Corbett MD        metoprolol tartrate (LOPRESSOR) half-tab 37.5 mg  37.5 mg Oral BID Latrell Corbett MD   37.5 mg at 04/14/24 1010    [Held by provider] multivitamin w/minerals (THERA-VIT-M) tablet 1 tablet  1 tablet Oral Daily Latrell Corbett MD        rosuvastatin (CRESTOR) tablet 20 mg  20 mg Oral Daily Ltarell Corbett MD   20 mg at 04/14/24 1010    sodium chloride (PF) 0.9% PF flush 3 mL  3 mL Intracatheter Q8H Lina Caldera MD   3 mL at 04/13/24 1328    traZODone (DESYREL) tablet 100 mg  100 mg Oral At Bedtime Latrell Corbett MD   100 mg at 04/13/24 6741    [Held by provider]  valACYclovir (VALTREX) tablet 1,000 mg  1,000 mg Oral Q12H [facility] (08/20) Latrell Corbett MD           Data   Recent Labs   Lab 04/14/24  1100 04/13/24  0607 04/12/24  1754   WBC  --  6.8 6.6   HGB  --  11.2* 12.5   MCV  --  103* 103*   PLT  --  244 275    139 137   POTASSIUM 3.9 4.0 4.2   CHLORIDE 101 101 98   CO2 29 29 26   BUN 13.9 15.3 14.5   CR 1.09* 1.17* 1.17*   ANIONGAP 8 9 13   BIRGIT 9.0 8.9 9.2   * 96 91   ALBUMIN  --   --  3.8   PROTTOTAL  --   --  6.9   BILITOTAL  --   --  0.5   ALKPHOS  --   --  64   ALT  --   --  15   AST  --   --  36       Recent Results (from the past 24 hour(s))   MR Cervical Spine w/o Contrast    Narrative    EXAM: MR CERVICAL SPINE W/O CONTRAST  LOCATION: [facility]  DATE: 4/13/2024    INDICATION: neck pain, with radiation to RUE  COMPARISON: None.  TECHNIQUE: MRI Cervical Spine without IV contrast.    FINDINGS:   Normal vertebral body heights.  No concerning bone marrow lesions.  No abnormal cord signal.      Multilevel degenerative disc disease worse at C5-C6 and C6-C7. Mild degenerative type I Modic changes C5-C6 and C6-C7. Mild degenerative type II Modic changes C5-C6. Multilevel uncovertebral and facet arthropathy.    Mild reversal normal lordotic curvature.    C4-C5 mild grade 1 anterolisthesis measuring 1 mm.  T2-T3 mild grade 1 anterolisthesis measuring 1 mm.  No additional subluxations.    Craniocervical junction: No significant thecal sac stenosis.    C2-C3: Bulge. Small superimposed posterior disc extrusion extending mildly above and below the disc margin. Uncovertebral facet arthropathy. No significant thecal sac stenosis. No significant left foraminal stenosis. Mild right foraminal stenosis.    C3-C4: Small posterior disc protrusion. Uncovertebral facet arthropathy. Mild to moderate thecal sac stenosis. Moderate to severe left foraminal stenosis. Moderate right foraminal stenosis.    C4-C5: Bulge. Right paracentral small posterior  disc extrusion extending mildly above and below the disc margin. Uncovertebral facet arthropathy. Mild to moderate thecal sac stenosis. Moderate to severe bilateral foraminal stenosis.    C5-C6: Bulge. Right paracentral small posterior disc protrusion. Uncovertebral facet arthropathy. Mild thecal sac stenosis. Mild to moderate left foraminal stenosis. No significant right foraminal stenosis.    C6-C7: Prominent bulge. Small superimposed posterior disc extrusion extending mildly above and below the disc margin. Uncovertebral facet arthropathy. Moderate thecal sac stenosis. Severe left foraminal stenosis. Mild to moderate right foraminal   stenosis.    C7-T1: No significant bulge or posterior disc protrusion. Facet arthropathy. No significant thecal sac stenosis. No significant left foraminal stenosis. Mild to moderate right foraminal stenosis.     T1-T2: Bulge. Facet arthropathy. No significant thecal sac stenosis. Mild left foraminal stenosis. Moderate right foraminal stenosis.    T2-T3: Mild bulge. Facet arthropathy. No significant thecal sac stenosis. No significant left foraminal stenosis. Moderate to severe right foraminal stenosis.    T3-T4: Mild bulge. Facet arthropathy. No significant thecal sac stenosis. No significant left foraminal stenosis. Moderate right foraminal stenosis.      EXTRASPINAL FINDINGS:  No significant findings.      Impression     IMPRESSION:  1.  Multiple level spondylosis described above.    2.  No critical thecal sac stenosis.    3.  C3-C4: Moderate to severe left foraminal stenosis.     4.  C4-C5: Moderate to severe bilateral foraminal stenosis.    5.  C6-C7: Severe left foraminal stenosis.     6.  T2-T3: Moderate to severe right foraminal stenosis.    7.  No abnormal cord signal.         MR Brain w/o Contrast    Narrative    EXAM: MR BRAIN W/O CONTRAST  LOCATION: [facility]  DATE: 4/13/2024    INDICATION: right extremity weakness  COMPARISON:  None.  TECHNIQUE: Routine multiplanar multisequence head MRI without intravenous contrast.    FINDINGS:  INTRACRANIAL CONTENTS: No acute or subacute infarct. No mass, acute hemorrhage, or extra-axial fluid collections. Patchy nonspecific T2/FLAIR hyperintensities within the cerebral white matter most consistent with mild to moderate chronic microvascular   ischemic change. Mild generalized cerebral atrophy. No hydrocephalus. Normal position of the cerebellar tonsils.     SELLA: No abnormality accounting for technique.    OSSEOUS STRUCTURES/SOFT TISSUES: Normal marrow signal. The major intracranial vascular flow voids are maintained.     ORBITS: No abnormality accounting for technique.     SINUSES/MASTOIDS: Mild mucosal thickening scattered about the paranasal sinuses. Right mastoid air cells essentially clear. Left mastoid air cells mild patchy opacification.       Impression    IMPRESSION:  1.  No acute infarct.  2.  Age-related changes described above.   Echocardiogram Complete   Result Value    LVEF  55-60%    Narrative    [id number]  [id number]  [id number]  [id number]^DL^PAVAN^SHORTY     [facility]  Echocardiography Laboratory  [address] [place] [address]     Name: DARWIN JASSO  MRN: [id number]  : 1945  Study Date: 2024 07:44 AM  Age: 78 yrs  Gender: Female  Patient Location: [facility]  Reason For Study: Angina Pectoris  Ordering Physician: PAVAN LIZAMA  Referring Physician: Ghazal Rubalcava  Performed By: Nishant Vincent     BSA: 1.8 m2  Height: 64 in  Weight: 167 lb  HR: 55  BP: 120/79 mmHg  ______________________________________________________________________________  Procedure  Complete Portable Echo Adult. Optison (NDC #2078-1610) given intravenously.  ______________________________________________________________________________  Interpretation Summary     Technically difficult imaging  No regional wall motion abnormalities noted.  Left ventricular  systolic function is normal.  The visual ejection fraction is 55-60%.  The left ventricle is normal in size.  There is a bioprosthetic aortic valve.( Payne Ultra Kaleigh 23 mm RENE  1.72cm2/MSG 18 mmHg/DI 0.60/AT 120ms)  The right ventricle is normal in structure, function and size.  The left atrium is moderately dilated.  Ascending aorta dilatation is present.(40mm)     There has been no signficant change since 8/23/2023.  ______________________________________________________________________________  Left Ventricle  The left ventricle is normal in size. There is normal left ventricular wall  thickness. Left ventricular systolic function is normal. The visual ejection  fraction is 55-60%. Grade I or early diastolic dysfunction. No regional wall  motion abnormalities noted. There is no thrombus seen in the left ventricle.     Right Ventricle  The right ventricle is normal in structure, function and size. There is no  mass or thrombus in the right ventricle.     Atria  The left atrium is moderately dilated. Right atrial size is normal. There is  no atrial shunt seen. The left atrial appendage is not well visualized.     Mitral Valve  There is moderate mitral annular calcification. There is no mitral  regurgitation noted. The mean mitral valve gradient is 4.1 mmHg.     Tricuspid Valve  Normal tricuspid valve. The right ventricular systolic pressure is  approximated at 20.8 mmHg plus the right atrial pressure. Right ventricle  systolic pressure estimate normal. There is no tricuspid stenosis.     Aortic Valve  There is a bioprosthetic aortic valve.     Pulmonic Valve  Normal pulmonic valve. There is no pulmonic valvular regurgitation. There is  no pulmonic valvular stenosis.     Vessels  The aortic root is normal size. Ascending aorta dilatation is present. The  inferior vena cava is normal.     Pericardium  The pericardium appears normal. There is no pleural effusion.     Rhythm  Sinus rhythm was  noted.  ______________________________________________________________________________  MMode/2D Measurements & Calculations  IVSd: 0.86 cm     LVIDd: 4.6 cm  LVIDs: 3.1 cm  LVPWd: 0.93 cm  FS: 32.9 %  LV mass(C)d: 135.4 grams  LV mass(C)dI: 74.7 grams/m2  LA dimension: 4.5 cm  asc Aorta Diam: 4.0 cm  LVOT diam: 2.0 cm  LVOT area: 3.0 cm2  Asc Ao diam index BSA (cm/m2): 2.2  Asc Ao diam index Ht(cm/m): 2.5  RWT: 0.40  TAPSE: 2.3 cm     Doppler Measurements & Calculations  MV E max enrique: 120.0 cm/sec  MV A max enrique: 134.5 cm/sec  MV E/A: 0.89  MV max P.0 mmHg  MV mean P.1 mmHg  MV V2 VTI: 75.6 cm  MVA(VTI): 1.7 cm2  MV P1/2t max enrique: 138.5 cm/sec  MV P1/2t: 197.3 msec  MVA(P1/2t): 1.1 cm2  MV dec slope: 205.6 cm/sec2  MV dec time: 0.54 sec  Ao V2 max: 283.7 cm/sec  Ao max P.0 mmHg  Ao V2 mean: 201.5 cm/sec  Ao mean P.6 mmHg  Ao V2 VTI: 78.8 cm  RENE(I,D): 1.7 cm2  RENE(V,D): 1.8 cm2     Ao acc time: 0.12 sec  LV V1 max P.6 mmHg  LV V1 max: 170.1 cm/sec  LV V1 VTI: 43.2 cm  SV(LVOT): 131.1 ml  SI(LVOT): 72.4 ml/m2  TR max enrique: 228.0 cm/sec  TR max P.8 mmHg  AV Enrique Ratio (DI): 0.60  RENE Index (cm2/m2): 0.92  E/E' av.8  Lateral E/e': 16.7  Medial E/e': 26.9  RV S Enrique: 7.4 cm/sec     ______________________________________________________________________________  Report approved by: Dr. Andre Deluna 2024 10:47 AM

## 2024-04-14 NOTE — PLAN OF CARE
VS:  Stable; Aki episodes this am;  Assist by: IND      Cardiac: RRR ; CP free overnight   Neuro: A&Ox4  CMS: Denies paresthesias; 1+ Edema to BL LE's; R>L; + erythema to R  Respi: clear to auscultation; on RA    : Continent; voiding freely   GI: Abdo soft, non-tender; BS audible;      Strip/Tele: SB with a 1deg HB; Long QT    Pressure areas/Skin:    - PA's intact            LDA's:     PIVC to L arm SL / ongoing Heparin gtt   Next Xa at _0800h_      Plans:    > Fluid restriction, Daily weight, intake and output.   > Echocardiogram   > Resume low-dose Lasix 2 mg daily now.   > Will benefit from compression stockings as well      > Cardio:   - probably perform invasive coronary angiogram on Monday rather than a CT coronary angiogram which uses significantly more contrast.    - Continue with aspirin and heparin.            Problem: Adult Inpatient Plan of Care  Goal: Absence of Hospital-Acquired Illness or Injury  Intervention: Identify and Manage Fall Risk  Recent Flowsheet Documentation  Taken 4/13/2024 2005 by Jahaira Dorantes, RN  Safety Promotion/Fall Prevention:   safety round/check completed   room organization consistent   room door open   nonskid shoes/slippers when out of bed   activity supervised  Intervention: Prevent Skin Injury  Recent Flowsheet Documentation  Taken 4/13/2024 2005 by Jahaira Dorantes, RN  Body Position: position changed independently     Problem: Heart Failure  Goal: Optimal Functional Ability  Intervention: Optimize Functional Ability  Recent Flowsheet Documentation  Taken 4/13/2024 2005 by Jahaira Dorantes, RN  Activity Management: ambulated in room  Goal: Effective Oxygenation and Ventilation  Intervention: Promote Airway Secretion Clearance  Recent Flowsheet Documentation  Taken 4/13/2024 2005 by Jahaira Dorantes, RN  Activity Management: ambulated in room     Problem: Acute Coronary Syndrome  Goal: Adequate Tissue Perfusion  Intervention:  Optimize Cardiac Tissue Perfusion  Recent Flowsheet Documentation  Taken 4/13/2024 2005 by Jahaira Dorantes, RN  Activity Management: ambulated in room

## 2024-04-14 NOTE — PLAN OF CARE
Goal Outcome Evaluation:      Plan of Care Reviewed With: patient          A+ox4. Baseline tremor in hands. Indep in room. Denies CP or SOB. Does have some GRULLON. VSS on RA. plan is angio on Monday. Heparin gtt infusing. SR. Per MD notes, no signs of CHF. RLE red, +1, unchanged.

## 2024-04-15 NOTE — DISCHARGE SUMMARY
[facility]    Discharge Summary  Hospitalist    Date of Admission:  4/12/2024  Date of Discharge:  4/15/2024  Discharging Provider: Latrell Corbett MD, MD  Date of Service (when I saw the patient): 04/15/24    Discharge Diagnoses   Please refer below    History of Present Illness   Kavitha Headley is an 78 year old female who presented with exertional dyspnea lower EXTR edema and hypertension    Hospital Course   Kavitha Headley is a 78 year old female with medical history significant for severe aortic stenosis s/p TAVR 08/2022, chronic diastolic CHF, dilated ascending aorta, paroxysmal SVT, HTN, HL, CKD stage III, chronic anemia, morbid obesity s/p gastric bypass, alcoholic liver disease, depression and anxiety, insomnia was sent to the ER from the clinic for evaluation of exertional dyspnea, leg edema and hypotension.  Patient is admitted on 4/12/2024 for further management.         Final discharge diagnoses and hospital course     NSTEMI/unstable angina  Minimal nonocclusive coronary artery disease  Suspected acute on chronic diastolic CHF  Abnormal stress test 4/2/2024  This is a 78-year-old female with multiple comorbidities as above, presented with exertional dyspnea which is genetically well-known, chest heaviness with activity, mildly elevated troponin but serial troponin remained flat    Twelve-lead EKG without ischemic changes.  Chest x-ray without pulmonary edema.  proBNP only 600.  Lactic acid negative.  Stress test as outpatient 4/2 showed mild ischemia in the apical segment of the LV, probably abnormal, and hyperdynamic LV function.     She was started on IV heparin, aspirin.  Cardiology is consulted.  Cardiologist has evaluate the patient recommend coronary angiogram   She remains on IV heparin while was in the hospital.  Initial blood pressure was low, losartan was held, and metoprolol was resumed.  Blood pressure is now stable, did resume PTA metoprolol on 4/13/2020 but  continue to hold losartan at this time  Continue Lasix 20 mg daily.  She has mild lower extremity edema right more than left, no sign of infection or cellulitis  Echocardiogram shows EF of 55 to 60%, no regional wall motion abnormality, bioprosthetic aortic valve present ascending aorta dilatation about 40 mm     -Resume PTA statin.  -Fluid restriction, Daily weight, intake and output.  Patient underwent coronary angiogram on 4/15/2024, only positive finding was mid LAD lesion 10% otherwise normal coronary arteries.  At this time she will continue with aspirin 81 mg daily, metoprolol to 37.5 mg twice daily, Crestor 20 mg daily.  Hypertension is now resolved, resume losartan at lower dose of 25 mg daily on discharge    At the time of discharge feeling well no chest pain shortness breath orthopnea PND palpitation fever chills or cough.  She will be discharged home in stable improved condition as per recommended by cardiology     Severe aortic stenosis s/p TAVR 8/9/2022  Hypotension episode: Resolved  Paroxysmal SVT  Hyperlipidemia  Hypertension  Ascending aortic dilation, 4.1 cm, stable  Resume metoprolol as appropriate stable.  Decrease dose of losartan 25 mg daily from 50 mg daily, will resume Lasix 20 mg daily  -Resume statin       CKD stage III  Creatinine is 1.1 which seems to be her baseline.        Bilateral leg edema: Improved  Patient has trace left lower extremity and 1+ lower EXTR edema.  She has prior bilateral TKA as well.  Ultrasound bilateral lower extremity negative for DVT  Resume low-dose Lasix 2 mg daily now.  Compression stockings applied, lower extremity edema significantly improved at this time.        Liver disease, presumed alcohol related  Limited abdominal ultrasound 11/2023 showed coarsened echotexture suggesting underlying fibrosis.  Patient is s/p cholecystectomy.  Patient with morbid obesity s/p gastric bypass.  Unclear if above represents liver disease due to progression of  steatohepatitis related to obesity or alcoholic liver disease.  -Platelet counts normal  -Reports she stopped drinking alcohol since February 1, 2024.  Albumin normal on admission     Generalized anxiety disorder  Major depressive disorder  Insomnia  Chronic pain syndrome  Resume PTA gabapentin, trazodone and Ambien when verified.        Neck pain with right upper extremity weakness  Patient does have history of neck pain, was seen at the TCU  MRI of the brain and cervical spine recommended.  MRI of the brain negative for any acute stroke  MRI of the cervical spine reviewed.  Follow-up with spine surgery as outpatient        DVT Prophylaxis: Heparin   Code Status: Full Code  Latrell Corbett MD, MD    Significant Results and Procedures   Coronary Findings    Diagnostic  Dominance: Left  Left Main   The vessel was visualized by selective angiography and is moderate in size. There was 0% vessel disease.      Left Anterior Descending   Mid LAD lesion is 10% stenosed.      Left Circumflex   The vessel was visualized by selective angiography and is moderate in size. There was 0% vessel disease.      Right Coronary Artery   The vessel was visualized by selective angiography and is moderate in size. There was 0% vessel disease.         Intervention     No interventions have been documented.       Pending Results   These results will be followed up by PCP  Unresulted Labs Ordered in the Past 30 Days of this Admission       Date and Time Order Name Status Description    4/12/2024  6:16 PM Blood Culture Peripheral Blood Preliminary     4/12/2024  6:16 PM Blood Culture Peripheral Blood Preliminary             Code Status   Full Code       Primary Care Physician   Ghazal Rubalcava    Physical Exam   Temp: 98.3  F (36.8  C) Temp src: Oral BP: 124/65 Pulse: 67   Resp: 16 SpO2: 96 % O2 Device: None (Room air)    Vitals:    04/13/24 0625 04/14/24 0500 04/15/24 0606   Weight: 75.8 kg (167 lb) 75.9 kg (167 lb 4.8 oz) 75.9 kg (167 lb 4.8 oz)      Vital Signs with Ranges  Temp:  [98.3  F (36.8  C)-98.4  F (36.9  C)] 98.3  F (36.8  C)  Pulse:  [62-97] 67  Resp:  [16-22] 16  BP: (103-137)/(57-77) 124/65  SpO2:  [95 %-97 %] 96 %  I/O last 3 completed shifts:  In: 610 [P.O.:610]  Out: 600 [Urine:600]    Constitutional: awake, alert, cooperative, no apparent distress, and appears stated age  Eyes: Lids and lashes normal, pupils equal, round and reactive to light, extra ocular muscles intact, sclera clear, conjunctiva normal  Respiratory: No increased work of breathing, good air exchange, clear to auscultation bilaterally, no crackles or wheezing  Cardiovascular: Normal apical impulse, regular rate and rhythm, normal S1 and S2, no S3 or S4, and no murmur noted  GI: No scars, normal bowel sounds, soft, non-distended, non-tender, no masses palpated, no hepatosplenomegally  Neurologic: No focal deficit    Discharge Disposition   Discharged to home  Condition at discharge: Stable    Consultations This Hospital Stay   PHARMACY IP CONSULT  CARDIAC REHAB IP CONSULT  CARDIOLOGY IP CONSULT  LYMPHEDEMA THERAPY IP CONSULT  PHARMACY IP CONSULT  CARE MANAGEMENT / SOCIAL WORK IP CONSULT  PHARMACY IP CONSULT  PHARMACY IP CONSULT  SMOKING CESSATION PROGRAM IP CONSULT  SMOKING CESSATION PROGRAM IP CONSULT    Time Spent on this Encounter   Latrell HUANG MD, personally saw the patient today and spent greater than 30 minutes discharging this patient.    Discharge Orders      Follow-Up with Cardiology TARUN      Reason for your hospital stay    GRULLON and Chest heaviness     Follow-up and recommended labs and tests     Follow up with primary care provider, Ghazal Rubalcava, within 7 days for hospital follow- up.  The following labs/tests are recommended: cbc,bmp.     Activity    Your activity upon discharge: activity as tolerated     Diet    Follow this diet upon discharge: Orders Placed This Encounter      Fluid restriction 1500 ML FLUID      Regular Diet Adult     Discharge Medications    Current Discharge Medication List        CONTINUE these medications which have CHANGED    Details   losartan (COZAAR) 50 MG tablet Take 0.5 tablets (25 mg) by mouth daily    Associated Diagnoses: Aneurysm of ascending aorta without rupture (H24); Essential hypertension           CONTINUE these medications which have NOT CHANGED    Details   acetaminophen (TYLENOL) 500 MG tablet Take 1,000 mg by mouth 2 times daily as needed for pain      aspirin (ASA) 81 MG EC tablet Take 81 mg by mouth daily      bisacodyl (DULCOLAX) 5 MG EC tablet Take 5 mg by mouth daily as needed for constipation      citalopram (CELEXA) 20 MG tablet Take 1 tablet (20 mg) by mouth daily  Qty: 90 tablet, Refills: 3    Associated Diagnoses: Mild major depression (H24)      folic acid (FOLVITE) 1 MG tablet Take 1 mg by mouth daily      furosemide (LASIX) 20 MG tablet Take 1 tablet (20 mg) by mouth daily  Qty: 10 tablet, Refills: 0    Associated Diagnoses: Lower extremity edema      gabapentin (NEURONTIN) 300 MG capsule Take 1 capsule (300 mg) by mouth every morning  Qty: 90 capsule, Refills: 1    Associated Diagnoses: Chronic pain syndrome      gabapentin (NEURONTIN) 600 MG tablet Take 1 tablet (600 mg) by mouth at bedtime  Qty: 90 tablet, Refills: 1    Associated Diagnoses: Chronic insomnia; Chronic pain syndrome      metoprolol tartrate (LOPRESSOR) 25 MG tablet Take 1.5 tablets (37.5 mg) by mouth 2 times daily  Qty: 135 tablet, Refills: 3    Associated Diagnoses: Essential hypertension      Multiple Vitamins-Minerals (CENTRUM SILVER) per tablet Take 1 tablet by mouth daily      polyethylene glycol (MIRALAX) 17 GM/Dose powder Take 17 g by mouth 2 times daily as needed for constipation      potassium chloride suleman ER (KLOR-CON M20) 20 MEQ CR tablet Take 1 tablet (20 mEq) by mouth daily  Qty: 10 tablet, Refills: 0    Associated Diagnoses: Lower extremity edema      rosuvastatin (CRESTOR) 20 MG tablet Take 1 tablet (20 mg) by mouth daily  Qty: 90  tablet, Refills: 3    Associated Diagnoses: Hyperlipidemia LDL goal <70      tiZANidine (ZANAFLEX) 4 MG capsule Take 4 mg by mouth 3 times daily as needed for muscle spasms      traZODone (DESYREL) 100 MG tablet TAKE 1 TABLET BY MOUTH AT BEDTIME  Qty: 30 tablet, Refills: 5    Associated Diagnoses: Chronic insomnia      valACYclovir (VALTREX) 1000 mg tablet Take 1 tablet (1,000 mg) by mouth as needed (onset of cold sore)  Qty: 4 tablet, Refills: 3    Associated Diagnoses: Cold sore      zolpidem ER (AMBIEN CR) 12.5 MG CR tablet Take 12.5 mg by mouth at bedtime      cefadroxil (DURICEF) 500 MG capsule Take 1 capsule (500 mg) by mouth 2 times daily  Qty: 14 capsule, Refills: 0    Associated Diagnoses: Lower extremity edema      naloxone (NARCAN) 4 MG/0.1ML nasal spray Spray 4 mg into one nostril alternating nostrils once as needed           Allergies   Allergies   Allergen Reactions    Bactrim [Sulfamethoxazole-Trimethoprim] Hives    Codeine Nausea and Itching    Morphine Itching     NAUSEA    Morphine And Related Itching     NAUSEA     Data   Most Recent 3 CBC's:  Recent Labs   Lab Test 04/15/24  0632 04/13/24  0607 04/12/24  1754   WBC 5.8 6.8 6.6   HGB 10.4* 11.2* 12.5   * 103* 103*    244 275      Most Recent 3 BMP's:  Recent Labs   Lab Test 04/15/24  0632 04/14/24  1100 04/13/24  0607    138 139   POTASSIUM 4.1 3.9 4.0   CHLORIDE 106 101 101   CO2 28 29 29   BUN 12.3 13.9 15.3   CR 1.07* 1.09* 1.17*   ANIONGAP 9 8 9   BIRGIT 8.7* 9.0 8.9   * 104* 96     Most Recent 2 LFT's:  Recent Labs   Lab Test 04/12/24  1754 02/14/24  1339   AST 36 43   ALT 15 14   ALKPHOS 64 71   BILITOTAL 0.5 0.4     Most Recent INR's and Anticoagulation Dosing History:  Anticoagulation Dose History  More data exists         Latest Ref Rng & Units 6/7/2017 6/10/2017 6/11/2017 6/16/2017 3/30/2021 6/20/2022 8/2/2022   Recent Dosing and Labs   INR 0.85 - 1.15 1.02  1.19  1.15  1.03  0.9     0.98  0.95       Details           This result is from an external source.             Most Recent 3 Troponin's:  Recent Labs   Lab Test 03/23/21  1506 02/12/20  0534 02/12/20  0049   TROPI <0.015 <0.015 <0.015     Most Recent Cholesterol Panel:  Recent Labs   Lab Test 04/13/24  0607   CHOL 195   LDL 98   HDL 81   TRIG 79     Most Recent 6 Bacteria Isolates From Any Culture (See EPIC Reports for Culture Details):  Recent Labs   Lab Test 06/06/17  1530 06/06/17  1457   CULT No growth No growth     Most Recent TSH, T4 and A1c Labs:  Recent Labs   Lab Test 04/12/24  1754 09/25/23  1442   TSH  --  2.18   A1C 5.1  --      Results for orders placed or performed during the hospital encounter of 04/12/24   XR Chest 2 Views    Narrative    EXAM: XR CHEST 2 VIEWS  LOCATION: [facility]  DATE: 4/12/2024    INDICATION: SOB, bilat leg swelling  COMPARISON: 08/10/2022      Impression    IMPRESSION: Heart size is enlarged but stable. Status post TAVR. No CHF, lobar consolidation or effusion. No acute bony abnormalities.   US Lower Extremity Venous Duplex Bilateral    Narrative    EXAM: US LOWER EXTREMITY VENOUS DUPLEX BILATERAL  LOCATION: [facility]  DATE: 4/12/2024    INDICATION: leg edema, eval for DVT  COMPARISON: None.  TECHNIQUE: Venous Duplex ultrasound of bilateral lower extremities with and without compression, augmentation and duplex. Color flow and spectral Doppler with waveform analysis performed.    FINDINGS: Exam includes the common femoral, femoral, popliteal veins as well as segmentally visualized deep calf veins and greater saphenous vein.     RIGHT: No deep vein thrombosis. No superficial thrombophlebitis. No popliteal cyst.    LEFT: No deep vein thrombosis. No superficial thrombophlebitis. No popliteal cyst.      Impression    IMPRESSION:  1.  No deep venous thrombosis in the bilateral lower extremities.   MR Cervical Spine w/o Contrast    Narrative    EXAM: MR CERVICAL SPINE W/O  CONTRAST  LOCATION: [facility]  DATE: 4/13/2024    INDICATION: neck pain, with radiation to RUE  COMPARISON: None.  TECHNIQUE: MRI Cervical Spine without IV contrast.    FINDINGS:   Normal vertebral body heights.  No concerning bone marrow lesions.  No abnormal cord signal.      Multilevel degenerative disc disease worse at C5-C6 and C6-C7. Mild degenerative type I Modic changes C5-C6 and C6-C7. Mild degenerative type II Modic changes C5-C6. Multilevel uncovertebral and facet arthropathy.    Mild reversal normal lordotic curvature.    C4-C5 mild grade 1 anterolisthesis measuring 1 mm.  T2-T3 mild grade 1 anterolisthesis measuring 1 mm.  No additional subluxations.    Craniocervical junction: No significant thecal sac stenosis.    C2-C3: Bulge. Small superimposed posterior disc extrusion extending mildly above and below the disc margin. Uncovertebral facet arthropathy. No significant thecal sac stenosis. No significant left foraminal stenosis. Mild right foraminal stenosis.    C3-C4: Small posterior disc protrusion. Uncovertebral facet arthropathy. Mild to moderate thecal sac stenosis. Moderate to severe left foraminal stenosis. Moderate right foraminal stenosis.    C4-C5: Bulge. Right paracentral small posterior disc extrusion extending mildly above and below the disc margin. Uncovertebral facet arthropathy. Mild to moderate thecal sac stenosis. Moderate to severe bilateral foraminal stenosis.    C5-C6: Bulge. Right paracentral small posterior disc protrusion. Uncovertebral facet arthropathy. Mild thecal sac stenosis. Mild to moderate left foraminal stenosis. No significant right foraminal stenosis.    C6-C7: Prominent bulge. Small superimposed posterior disc extrusion extending mildly above and below the disc margin. Uncovertebral facet arthropathy. Moderate thecal sac stenosis. Severe left foraminal stenosis. Mild to moderate right foraminal   stenosis.    C7-T1: No significant bulge or  posterior disc protrusion. Facet arthropathy. No significant thecal sac stenosis. No significant left foraminal stenosis. Mild to moderate right foraminal stenosis.     T1-T2: Bulge. Facet arthropathy. No significant thecal sac stenosis. Mild left foraminal stenosis. Moderate right foraminal stenosis.    T2-T3: Mild bulge. Facet arthropathy. No significant thecal sac stenosis. No significant left foraminal stenosis. Moderate to severe right foraminal stenosis.    T3-T4: Mild bulge. Facet arthropathy. No significant thecal sac stenosis. No significant left foraminal stenosis. Moderate right foraminal stenosis.      EXTRASPINAL FINDINGS:  No significant findings.      Impression     IMPRESSION:  1.  Multiple level spondylosis described above.    2.  No critical thecal sac stenosis.    3.  C3-C4: Moderate to severe left foraminal stenosis.     4.  C4-C5: Moderate to severe bilateral foraminal stenosis.    5.  C6-C7: Severe left foraminal stenosis.     6.  T2-T3: Moderate to severe right foraminal stenosis.    7.  No abnormal cord signal.         MR Brain w/o Contrast    Narrative    EXAM: MR BRAIN W/O CONTRAST  LOCATION: [facility]  DATE: 4/13/2024    INDICATION: right extremity weakness  COMPARISON: None.  TECHNIQUE: Routine multiplanar multisequence head MRI without intravenous contrast.    FINDINGS:  INTRACRANIAL CONTENTS: No acute or subacute infarct. No mass, acute hemorrhage, or extra-axial fluid collections. Patchy nonspecific T2/FLAIR hyperintensities within the cerebral white matter most consistent with mild to moderate chronic microvascular   ischemic change. Mild generalized cerebral atrophy. No hydrocephalus. Normal position of the cerebellar tonsils.     SELLA: No abnormality accounting for technique.    OSSEOUS STRUCTURES/SOFT TISSUES: Normal marrow signal. The major intracranial vascular flow voids are maintained.     ORBITS: No abnormality accounting for technique.      SINUSES/MASTOIDS: Mild mucosal thickening scattered about the paranasal sinuses. Right mastoid air cells essentially clear. Left mastoid air cells mild patchy opacification.       Impression    IMPRESSION:  1.  No acute infarct.  2.  Age-related changes described above.   Echocardiogram Complete     Value    LVEF  55-60%    Narrative    [id number]  [id number]  [id number]  [id number]^PAVAN^SHORTY     [facility]  Echocardiography Laboratory  [address] [place] [id number]     Name: DARWIN JASSO  MRN: [id number]  : 1945  Study Date: 2024 07:44 AM  Age: 78 yrs  Gender: Female  Patient Location: [facility]  Reason For Study: Angina Pectoris  Ordering Physician: PAVAN LIZAMA  Referring Physician: Ghazal Rubalcava  Performed By: Nishant Vincent     BSA: 1.8 m2  Height: 64 in  Weight: 167 lb  HR: 55  BP: 120/79 mmHg  ______________________________________________________________________________  Procedure  Complete Portable Echo Adult. Optison (NDC #[id number]) given intravenously.  ______________________________________________________________________________  Interpretation Summary     Technically difficult imaging  No regional wall motion abnormalities noted.  Left ventricular systolic function is normal.  The visual ejection fraction is 55-60%.  The left ventricle is normal in size.  There is a bioprosthetic aortic valve.( Payne Ultra Kaleigh 23 mm RENE  1.72cm2/MSG 18 mmHg/DI 0.60/AT 120ms)  The right ventricle is normal in structure, function and size.  The left atrium is moderately dilated.  Ascending aorta dilatation is present.(40mm)     There has been no signficant change since 2023.  ______________________________________________________________________________  Left Ventricle  The left ventricle is normal in size. There is normal left ventricular wall  thickness. Left ventricular systolic function is normal. The visual ejection  fraction is 55-60%.  Grade I or early diastolic dysfunction. No regional wall  motion abnormalities noted. There is no thrombus seen in the left ventricle.     Right Ventricle  The right ventricle is normal in structure, function and size. There is no  mass or thrombus in the right ventricle.     Atria  The left atrium is moderately dilated. Right atrial size is normal. There is  no atrial shunt seen. The left atrial appendage is not well visualized.     Mitral Valve  There is moderate mitral annular calcification. There is no mitral  regurgitation noted. The mean mitral valve gradient is 4.1 mmHg.     Tricuspid Valve  Normal tricuspid valve. The right ventricular systolic pressure is  approximated at 20.8 mmHg plus the right atrial pressure. Right ventricle  systolic pressure estimate normal. There is no tricuspid stenosis.     Aortic Valve  There is a bioprosthetic aortic valve.     Pulmonic Valve  Normal pulmonic valve. There is no pulmonic valvular regurgitation. There is  no pulmonic valvular stenosis.     Vessels  The aortic root is normal size. Ascending aorta dilatation is present. The  inferior vena cava is normal.     Pericardium  The pericardium appears normal. There is no pleural effusion.     Rhythm  Sinus rhythm was noted.  ______________________________________________________________________________  MMode/2D Measurements & Calculations  IVSd: 0.86 cm     LVIDd: 4.6 cm  LVIDs: 3.1 cm  LVPWd: 0.93 cm  FS: 32.9 %  LV mass(C)d: 135.4 grams  LV mass(C)dI: 74.7 grams/m2  LA dimension: 4.5 cm  asc Aorta Diam: 4.0 cm  LVOT diam: 2.0 cm  LVOT area: 3.0 cm2  Asc Ao diam index BSA (cm/m2): 2.2  Asc Ao diam index Ht(cm/m): 2.5  RWT: 0.40  TAPSE: 2.3 cm     Doppler Measurements & Calculations  MV E max hermes: 120.0 cm/sec  MV A max hermes: 134.5 cm/sec  MV E/A: 0.89  MV max P.0 mmHg  MV mean P.1 mmHg  MV V2 VTI: 75.6 cm  MVA(VTI): 1.7 cm2  MV P1/2t max hermes: 138.5 cm/sec  MV P1/2t: 197.3 msec  MVA(P1/2t): 1.1 cm2  MV dec slope:  205.6 cm/sec2  MV dec time: 0.54 sec  Ao V2 max: 283.7 cm/sec  Ao max P.0 mmHg  Ao V2 mean: 201.5 cm/sec  Ao mean P.6 mmHg  Ao V2 VTI: 78.8 cm  RENE(I,D): 1.7 cm2  RENE(V,D): 1.8 cm2     Ao acc time: 0.12 sec  LV V1 max P.6 mmHg  LV V1 max: 170.1 cm/sec  LV V1 VTI: 43.2 cm  SV(LVOT): 131.1 ml  SI(LVOT): 72.4 ml/m2  TR max enrique: 228.0 cm/sec  TR max P.8 mmHg  AV Enrique Ratio (DI): 0.60  RENE Index (cm2/m2): 0.92  E/E' av.8  Lateral E/e': 16.7  Medial E/e': 26.9  RV S Enrique: 7.4 cm/sec     ______________________________________________________________________________  Report approved by: Dr. Andre Deluna 2024 10:47 AM         Cardiac Catheterization    Narrative      Mid LAD lesion is 10% stenosed.      1.  Minimal nonocclusive coronary artery disease  2.  Unchanged when directly compared to prior angiogram       *Note: Due to a large number of results and/or encounters for the requested time period, some results have not been displayed. A complete set of results can be found in Results Review.     Most Recent 3 CBC's:  Recent Labs   Lab Test 04/15/24  0632 24  0607 24  1754   WBC 5.8 6.8 6.6   HGB 10.4* 11.2* 12.5   * 103* 103*    244 275     Most Recent 3 BMP's:  Recent Labs   Lab Test 04/15/24  0632 24  1100 24  0607    138 139   POTASSIUM 4.1 3.9 4.0   CHLORIDE 106 101 101   CO2 28 29 29   BUN 12.3 13.9 15.3   CR 1.07* 1.09* 1.17*   ANIONGAP 9 8 9   BIRGIT 8.7* 9.0 8.9   * 104* 96     Most Recent 2 LFT's:  Recent Labs   Lab Test 24  1754 24  1339   AST 36 43   ALT 15 14   ALKPHOS 64 71   BILITOTAL 0.5 0.4

## 2024-04-15 NOTE — PRE-PROCEDURE
GENERAL PRE-PROCEDURE:   Procedure:  Coronary angiogram  Date/Time:  4/15/2024 10:43 AM    Verbal consent obtained?: Yes    Written consent obtained?: Yes    Risks and benefits: Risks, benefits and alternatives were discussed    Patient states understanding of procedure being performed: Yes    Patient's understanding of procedure matches consent: Yes    Procedure consent matches procedure scheduled: Yes    Expected level of sedation:  Moderate  Appropriately NPO:  Yes  ASA Class:  3  Mallampati  :  Grade 3- soft palate visible, posterior pharyngeal wall not visible  Lungs:  Lungs clear with good breath sounds bilaterally  Heart:  Normal heart sounds and rate  History & Physical reviewed:  History and physical reviewed and no updates needed  Statement of review:  I have reviewed the lab findings, diagnostic data, medications, and the plan for sedation

## 2024-04-15 NOTE — PROGRESS NOTES
[facility]    ~Cardiology Progress Note~    Primary Cardiologist: Dr. Ugarte    Date of Admission: 4/12/2024  Service Date: 04/15/24    Summary:  Ms. Kavitha Headley is a very pleasant 78 year old female who was admitted on 4/12/2024.     Interval April 15, 2024:  No acute events overnight. Plan for coronary angiogram this morning. Consent obtained.           Assessment and Impression:     Unstable angina and dyspnea  History of abnormal nuclear stress test  (4/2/2024)  Troponin mildly elevated, 27  ECG 4/12- in NSR with no acute ST changes, 1st degree AV block, HR 74  Echocardiogram 4/14- LVEF 55-60%, no WMA   Coronary angiogram 4/15- mild nonocclusive CAD    S/p TAVR 2022    Bilateral lower extremity edema            Recommendations and Plan:     Patient cleared to discharge today from cardiology standpoint   Recommend mobilizing with nursing or therapy prior to discharge   Discharge on the following cardiology regimen-   Rosuvastatin 20 mg daily  Metoprolol tartrate 37.5 mg BID   ASA 81 mg daily   Recommend lymphedema wear   Encourage follow up with PCP post hospitalization       Plan of care was formulated under the direction and guidance of Dr. Abarca.         Shiloh Blevins PA-C  Physician Assistant   [facility]  Pager: [phone]      20 total minutes was spent today including chart review, history and exam, and reviewing studies as outlined above.       Patient Active Problem List   Diagnosis    Spinal stenosis    Benign essential hypertension    Chronic insomnia    Alcohol use disorder, severe, in early remission (H)    CKD (chronic kidney disease) stage 3, GFR 30-59 ml/min (H)    Chronic pain syndrome    Bariatric surgery status    Personal history of urinary calculi    Anxiety    Vitamin B12 deficiency (non anemic)    Liver disease, chronic, due to alcohol (H24)    Coronary artery disease involving native coronary artery of native heart without angina  pectoris    Mild ascending aorta dilatation (H24)    Psoriasis - on Humira - Dr. Gauri Clark with dermatology    Thiamine deficiency    Cold sore    Gastroesophageal reflux disease with esophagitis - chronic due to alcohol    Enlarged pulmonary artery (H)    Chronic diastolic (congestive) heart failure (H)    History of aortic valve replacement - Severe aortic stenosis status post TAVR with 23 mm S3 valve on 8/9/22    Alcohol dependence (H)    Shortness of breath    Anemia, unspecified type    Morbid (severe) obesity due to excess calories (H)    H/O gastric bypass    Mild major depression (H24)    Leg swelling    Exertional dyspnea    Elevated troponin    Hypotension, unspecified hypotension type    History of transcatheter aortic valve replacement (TAVR)       Physical Exam   Temp: 98.3  F (36.8  C) Temp src: Oral BP: 103/71 Pulse: 97   Resp: 18 SpO2: 95 % O2 Device: Nasal cannula    Vitals:    04/13/24 0625 04/14/24 0500 04/15/24 0606   Weight: 75.8 kg (167 lb) 75.9 kg (167 lb 4.8 oz) 75.9 kg (167 lb 4.8 oz)     I/O last 3 completed shifts:  In: 610 [P.O.:610]  Out: 600 [Urine:600]    Constitutional: Appears stated age, well nourished, NAD.  Neck: Supple.   Respiratory: Non-labored. Lungs CTAB.  Cardiovascular: RRR, normal S1 and S2. No M/G/R. Bilateral lower extremities with 2+ edema.   GI: Soft, non-distended, non-tender.  Skin: Warm and dry.   Musculoskeletal/Extremities: Symmetrical movement.  Neurologic: No gross focal deficits. Alert, awake.  Psychiatric: Affect appropriate. Mentation normal.    Medications   Current Facility-Administered Medications   Medication Dose Route Frequency Provider Last Rate Last Admin    Continuing beta blocker from home medication list OR beta blocker order already placed during this visit   Does not apply DOES NOT GO TO Lina Gonsalez MD        Continuing statin from home medication list OR statin order already placed during this visit   Does not apply DOES NOT GO TO  Lina Gonsalez MD        heparin infusion 25,000 units in D5W 250 mL ANTICOAGULANT  0-5,000 Units/hr Intravenous Continuous Lina Caldera MD   Stopped at 04/15/24 1054    medication instruction   Does not apply Continuous PRN Lina Caldera MD        Patient is already receiving anticoagulation with heparin, enoxaparin (LOVENOX), warfarin (COUMADIN)  or other anticoagulant medication   Does not apply Continuous PRN Lina Caldera MD        Reason ACE/ARB/ARNI order not selected   Other DOES NOT GO TO Lina Gonsalez MD        sodium chloride 0.9 % infusion  75 mL/hr Intravenous Continuous Shai Peres MD         Current Facility-Administered Medications   Medication Dose Route Frequency Provider Last Rate Last Admin    aspirin (ASA) chewable tablet 81 mg  81 mg Oral Daily Lina Caldera MD   81 mg at 04/14/24 1009    [Held by provider] aspirin EC tablet 81 mg  81 mg Oral Daily Latrell Corbett MD        [Held by provider] cefadroxil (DURICEF) capsule 500 mg  500 mg Oral BID Latrell Corbett MD        citalopram (celeXA) tablet 20 mg  20 mg Oral Daily Latrell Corbett MD   20 mg at 04/15/24 0904    folic acid (FOLVITE) tablet 1 mg  1 mg Oral Daily Latrell Corbett MD   1 mg at 04/15/24 0905    furosemide (LASIX) tablet 20 mg  20 mg Oral Daily Latrell Corbett MD   20 mg at 04/14/24 1010    gabapentin (NEURONTIN) capsule 300 mg  300 mg Oral QAM Latrell Corbett MD   300 mg at 04/15/24 0905    gabapentin (NEURONTIN) tablet 600 mg  600 mg Oral At Bedtime Latrell Corbett MD   600 mg at 04/14/24 2226    [Held by provider] losartan (COZAAR) tablet 50 mg  50 mg Oral Daily Latrell Corbett MD        metoprolol tartrate (LOPRESSOR) half-tab 37.5 mg  37.5 mg Oral BID Latrell Corbett MD   37.5 mg at 04/15/24 0904    [Held by provider] multivitamin w/minerals (THERA-VIT-M) tablet 1 tablet  1 tablet Oral Daily Latrell Corbett MD        rosuvastatin (CRESTOR)  tablet 20 mg  20 mg Oral Daily Latrell Corbett MD   20 mg at 04/15/24 0905    sodium chloride (PF) 0.9% PF flush 3 mL  3 mL Intracatheter Q8H Lina Caldera MD   3 mL at 24 1328    traZODone (DESYREL) tablet 100 mg  100 mg Oral At Bedtime Latrell Corbett MD   100 mg at 24 2226    [Held by provider] valACYclovir (VALTREX) tablet 1,000 mg  1,000 mg Oral Q12H [facility] () Latrell Corbett MD           Data   Last 24 hours labs personally reviewed.  Echo:   Recent Results (from the past 4320 hour(s))   Echocardiogram Complete   Result Value    LVEF  55-60%    Narrative    [id number]  [id number]  [id number]  [id number]^DL^PAVAN^SHORTY     [facility]  Echocardiography Laboratory  [address] [place] [address]     Name: DARWIN JASSO  MRN: [id number]  : 1945  Study Date: 2024 07:44 AM  Age: 78 yrs  Gender: Female  Patient Location: [facility]  Reason For Study: Angina Pectoris  Ordering Physician: PAVAN LIZAMA  Referring Physician: Ghazal Rubalcava  Performed By: Nishant Vincent     BSA: 1.8 m2  Height: 64 in  Weight: 167 lb  HR: 55  BP: 120/79 mmHg  ______________________________________________________________________________  Procedure  Complete Portable Echo Adult. Optison (NDC #[id number]) given intravenously.  ______________________________________________________________________________  Interpretation Summary     Technically difficult imaging  No regional wall motion abnormalities noted.  Left ventricular systolic function is normal.  The visual ejection fraction is 55-60%.  The left ventricle is normal in size.  There is a bioprosthetic aortic valve.( Payne Ultra Kaleigh 23 mm RENE  1.72cm2/MSG 18 mmHg/DI 0.60/AT 120ms)  The right ventricle is normal in structure, function and size.  The left atrium is moderately dilated.  Ascending aorta dilatation is present.(40mm)     There has been no signficant change since  8/23/2023.  ______________________________________________________________________________  Left Ventricle  The left ventricle is normal in size. There is normal left ventricular wall  thickness. Left ventricular systolic function is normal. The visual ejection  fraction is 55-60%. Grade I or early diastolic dysfunction. No regional wall  motion abnormalities noted. There is no thrombus seen in the left ventricle.     Right Ventricle  The right ventricle is normal in structure, function and size. There is no  mass or thrombus in the right ventricle.     Atria  The left atrium is moderately dilated. Right atrial size is normal. There is  no atrial shunt seen. The left atrial appendage is not well visualized.     Mitral Valve  There is moderate mitral annular calcification. There is no mitral  regurgitation noted. The mean mitral valve gradient is 4.1 mmHg.     Tricuspid Valve  Normal tricuspid valve. The right ventricular systolic pressure is  approximated at 20.8 mmHg plus the right atrial pressure. Right ventricle  systolic pressure estimate normal. There is no tricuspid stenosis.     Aortic Valve  There is a bioprosthetic aortic valve.     Pulmonic Valve  Normal pulmonic valve. There is no pulmonic valvular regurgitation. There is  no pulmonic valvular stenosis.     Vessels  The aortic root is normal size. Ascending aorta dilatation is present. The  inferior vena cava is normal.     Pericardium  The pericardium appears normal. There is no pleural effusion.     Rhythm  Sinus rhythm was noted.  ______________________________________________________________________________  MMode/2D Measurements & Calculations  IVSd: 0.86 cm     LVIDd: 4.6 cm  LVIDs: 3.1 cm  LVPWd: 0.93 cm  FS: 32.9 %  LV mass(C)d: 135.4 grams  LV mass(C)dI: 74.7 grams/m2  LA dimension: 4.5 cm  asc Aorta Diam: 4.0 cm  LVOT diam: 2.0 cm  LVOT area: 3.0 cm2  Asc Ao diam index BSA (cm/m2): 2.2  Asc Ao diam index Ht(cm/m): 2.5  RWT: 0.40  TAPSE: 2.3 cm      Doppler Measurements & Calculations  MV E max enrique: 120.0 cm/sec  MV A max enrique: 134.5 cm/sec  MV E/A: 0.89  MV max P.0 mmHg  MV mean P.1 mmHg  MV V2 VTI: 75.6 cm  MVA(VTI): 1.7 cm2  MV P1/2t max enrique: 138.5 cm/sec  MV P1/2t: 197.3 msec  MVA(P1/2t): 1.1 cm2  MV dec slope: 205.6 cm/sec2  MV dec time: 0.54 sec  Ao V2 max: 283.7 cm/sec  Ao max P.0 mmHg  Ao V2 mean: 201.5 cm/sec  Ao mean P.6 mmHg  Ao V2 VTI: 78.8 cm  RENE(I,D): 1.7 cm2  RENE(V,D): 1.8 cm2     Ao acc time: 0.12 sec  LV V1 max P.6 mmHg  LV V1 max: 170.1 cm/sec  LV V1 VTI: 43.2 cm  SV(LVOT): 131.1 ml  SI(LVOT): 72.4 ml/m2  TR max enrique: 228.0 cm/sec  TR max P.8 mmHg  AV Enrique Ratio (DI): 0.60  RENE Index (cm2/m2): 0.92  E/E' av.8  Lateral E/e': 16.7  Medial E/e': 26.9  RV S Enrique: 7.4 cm/sec     ______________________________________________________________________________  Report approved by: Dr. Andre Deluna 2024 10:47 AM

## 2024-04-15 NOTE — PLAN OF CARE
Goal Outcome Evaluation:      Plan of Care Reviewed With: patient, child             A+ox4. Baseline tremor in hands. Indep in room. Denies CP or SOB. Does have some GRULLON. VSS on RA. L groin site WDL, no hematoma or bruit. Educated on post angio precautions and signs of bleeding/infection, aware of follow up appts.  SR. Per MD notes, no signs of CHF. RLE red, +1, unchanged. Discharging home with daughter this afternoon. Agioseal booklet given and AVS.

## 2024-04-15 NOTE — CONSULTS
Care Management Initial Consult    General Information  Assessment completed with: Patient,    Type of CM/SW Visit: Initial Assessment    Primary Care Provider verified and updated as needed: Yes   Readmission within the last 30 days: no previous admission in last 30 days      Reason for Consult: discharge planning  Advance Care Planning: Advance Care Planning Reviewed: present on chart          Communication Assessment  Patient's communication style: spoken language (English or Bilingual)    Hearing Difficulty or Deaf: no   Wear Glasses or Blind: other (see comments) (reading glasses)    Cognitive  Cognitive/Neuro/Behavioral: WDL                      Living Environment:   People in home: alone     Current living Arrangements: house      Able to return to prior arrangements: yes       Family/Social Support:  Care provided by: self  Provides care for: no one  Marital Status:   Children, Significant Other          Description of Support System: Supportive, Involved         Current Resources:   Patient receiving home care services: No     Community Resources: None  Equipment currently used at home: none  Supplies currently used at home: None    Employment/Financial:  Employment Status: retired        Financial Concerns: none           Does the patient's insurance plan have a 3 day qualifying hospital stay waiver?  Yes     Which insurance plan 3 day waiver is available? Alternative insurance waiver    Will the waiver be used for post-acute placement? No    Lifestyle & Psychosocial Needs:  Social Determinants of Health     Food Insecurity: Low Risk  (12/11/2023)    Food Insecurity     Within the past 12 months, did you worry that your food would run out before you got money to buy more?: No     Within the past 12 months, did the food you bought just not last and you didn t have money to get more?: No   Depression: Not at risk (4/9/2024)    PHQ-2     PHQ-2 Score: 2   Housing Stability: Low Risk  (12/11/2023)     Housing Stability     Do you have housing? : Yes     Are you worried about losing your housing?: No   Tobacco Use: Low Risk  (2/15/2024)    Patient History     Smoking Tobacco Use: Never     Smokeless Tobacco Use: Never     Passive Exposure: Not on file   Financial Resource Strain: Low Risk  (12/11/2023)    Financial Resource Strain     Within the past 12 months, have you or your family members you live with been unable to get utilities (heat, electricity) when it was really needed?: No   Alcohol Use: Not on file   Transportation Needs: Low Risk  (12/11/2023)    Transportation Needs     Within the past 12 months, has lack of transportation kept you from medical appointments, getting your medicines, non-medical meetings or appointments, work, or from getting things that you need?: No   Physical Activity: Not on file   Interpersonal Safety: Not on file   Stress: Not on file   Social Connections: Not on file   Health Literacy: Not on file       Functional Status:  Prior to admission patient needed assistance:   Dependent ADLs:: Independent  Dependent IADLs:: Independent     Values/Beliefs:  Spiritual, Cultural Beliefs, Taoist Practices, Values that affect care: no               Additional Information:    Writer met with patient  and introduced self and role.  Patient lives in a [facility]  and has a SO that comes and stays on weekends occasionally.  She is independent with mobility at baseline and manages her own meds.     Brooke Emerson RN Care Coordinator  [facility]  [phone]

## 2024-04-15 NOTE — PROGRESS NOTES
Care Management Discharge Note    Discharge Date: 04/15/2024       Discharge Disposition: Home    Discharge Services: None    Discharge DME: None    Discharge Transportation: family or friend will provide    Private pay costs discussed: Not applicable    Does the patient's insurance plan have a 3 day qualifying hospital stay waiver?  No    PAS Confirmation Code:    Patient/family educated on Medicare website which has current facility and service quality ratings:      Education Provided on the Discharge Plan:    Persons Notified of Discharge Plans: patient, bedside Rn  Patient/Family in Agreement with the Plan: yes    Handoff Referral Completed: Yes    Additional Information:  Patient discharging home.  PCP and cardiology follow up arranged and added to AVS.  No other discharge needs identified.    Brooke Emerson RN Care Coordinator  [facility]  [phone]

## 2024-04-15 NOTE — PLAN OF CARE
VS:  Stable  Assist by: IND     Cardiac: RRR;CP free overnight   Neuro: A&Ox4  CMS: Denies paresthesias   Respi: clear to auscultation; on RA    : Continent; voiding freely   GI: Abdo soft, non-tender; BS audible; Last ALFONZO: 3/14/24    Strip/Tele: SR with 1* AVB    Pressure areas/Skin:    - PA's intact    - BL LE's Edema 1+; Improved erythema from day previous to R leg      LDA's:     PIVC to L arm ongoing Heparin gtt      Plans:    > Fluid restriction, Daily weight, intake and output.   > coronary angiogram tomorrow 4/15/2024   > Will benefit from compression stockings as well      > Cardio:   - Angio 4/15            Problem: Adult Inpatient Plan of Care  Goal: Absence of Hospital-Acquired Illness or Injury  Intervention: Identify and Manage Fall Risk  Recent Flowsheet Documentation  Taken 4/15/2024 0000 by Jahaira Dorantes RN  Safety Promotion/Fall Prevention: safety round/check completed  Taken 4/14/2024 2015 by Jahaira Doratnes RN  Safety Promotion/Fall Prevention:   supervised activity   room organization consistent   room door open   nonskid shoes/slippers when out of bed   safety round/check completed  Intervention: Prevent Skin Injury  Recent Flowsheet Documentation  Taken 4/15/2024 0000 by Jahaira Dorantes RN  Body Position:   left   side-lying 90 degrees  Taken 4/14/2024 2015 by Jahaira Dorantes RN  Body Position: sitting up in bed  Taken 4/14/2024 1923 by Jahaira Dorantes RN  Body Position: position changed independently     Problem: Risk for Delirium  Goal: Optimal Coping  Intervention: Optimize Psychosocial Adjustment to Delirium  Recent Flowsheet Documentation  Taken 4/14/2024 2015 by Jahaira Dorantes RN  Supportive Measures: active listening utilized  Goal: Improved Behavioral Control  Intervention: Minimize Safety Risk  Recent Flowsheet Documentation  Taken 4/14/2024 2015 by Jahaira Dorantes RN  Communication Enhancement  Strategies: call light answered in person     Problem: Heart Failure  Goal: Optimal Coping  Intervention: Support Psychosocial Response  Recent Flowsheet Documentation  Taken 4/14/2024 2015 by Jahaira Dorantes RN  Supportive Measures: active listening utilized  Goal: Optimal Functional Ability  Intervention: Optimize Functional Ability  Recent Flowsheet Documentation  Taken 4/14/2024 2015 by Jahaira Dorantes, RN  Activity Management: activity adjusted per tolerance  Taken 4/14/2024 1923 by Jahaira Dorantes RN  Activity Management:   activity adjusted per tolerance   sitting, edge of bed  Goal: Effective Oxygenation and Ventilation  Intervention: Promote Airway Secretion Clearance  Recent Flowsheet Documentation  Taken 4/14/2024 2015 by Jahaira Dorantes RN  Cough And Deep Breathing: done independently per patient  Activity Management: activity adjusted per tolerance  Taken 4/14/2024 1923 by Jahaira Dorantes, RN  Activity Management:   activity adjusted per tolerance   sitting, edge of bed  Intervention: Optimize Oxygenation and Ventilation  Recent Flowsheet Documentation  Taken 4/15/2024 0000 by Jahaira Dorantes RN  Head of Bed (HOB) Positioning: HOB at 15 degrees     Problem: Acute Coronary Syndrome  Goal: Optimal Adaptation to Illness  Intervention: Support Adjustment to Life-Change Event  Recent Flowsheet Documentation  Taken 4/14/2024 2015 by Jahaira Dorantes RN  Supportive Measures: active listening utilized  Goal: Adequate Tissue Perfusion  Intervention: Optimize Cardiac Tissue Perfusion  Recent Flowsheet Documentation  Taken 4/14/2024 2015 by Jahaira Dorantes, RN  Activity Management: activity adjusted per tolerance  Taken 4/14/2024 1923 by Jahaira Dorantes RN  Activity Management:   activity adjusted per tolerance   sitting, edge of bed

## 2024-04-16 NOTE — TELEPHONE ENCOUNTER
Patient was admitted to [facility] on 4/12/24 with chest pain, SOB, BLE with minimal troponin elevation. History of abnormal nuclear stress test (4/2/2024).    PMH: severe aortic stenosis s/p TAVR 08/2022, chronic diastolic CHF, dilated ascending aorta, paroxysmal SVT, HTN, HL, CKD stage III, chronic anemia, morbid obesity s/p gastric bypass, alcoholic liver disease, depression and anxiety, insomnia.    4/15/24: Echo showed EF of 55-60%, no WMA.    4/15/24: Coronary angiogram via [facility] showed:      Mid LAD lesion is 10% stenosed.     1.  Minimal nonocclusive coronary artery disease  2.  Unchanged when directly compared to prior angiogram    PTA Losartan dosage was decreased at time of discharge.    Called patient to discuss any post hospital d/c questions she may have, review medication changes, and confirm f/u appts. Patient denied any questions regarding new medications or changes with their PTA medications.    Patient denied any SOB, chest pain or lightheadedness.     LFA cardiac cath site is without bleeding, swelling, redness or signs of infection.     RN confirmed with patient that she is scheduled for an OV on 6/14/24 at 1320 with MERI Shiloh Blevins at our [place] Office.    Patient advised to call clinic with any cardiac related questions or concerns prior to this meri't. Patient verbalized understanding and agreed with plan. MANISH Hernandez RN.

## 2024-04-16 NOTE — PROGRESS NOTES
Clinic Care Coordination Contact  [facility]/Voicemail    Clinical Data: Care Coordinator Outreach    Outreach Documentation Number of Outreach Attempt   4/16/2024  10:12 AM 1     Left message on patient's voicemail with call back information and requested return call.    Plan: Care Coordinator will try to reach patient again in 1-2 business days.     Ryann Vuong RN, BSN, PHN  Primary Care / Care Coordinator   [facility]  [email or web address] [email or web address]   [phone]

## 2024-04-17 NOTE — PROGRESS NOTES
Clinic Care Coordination Contact  [facility]/Voicemail    Clinical Data: Care Coordinator Outreach    Outreach Documentation Number of Outreach Attempt   4/16/2024  10:12 AM 1   4/17/2024  11:03 AM 2     Left message on patient's voicemail with call back information and requested return call.    Plan: Care Coordinator will send care coordination introduction letter with care coordinator contact information and explanation of care coordination services [facility]. Care Coordinator will do no further outreaches at this time.     Ryann Vuong RN, BSN, PHN  Primary Care / Care Coordinator   [facility]  [email or web address] [email or web address]   [phone]

## 2024-04-17 NOTE — LETTER
[facility] CARE COORDINATION  [address] [place] [address]    April 17, 2024    Kavitha Headley  [address] [facility]  [place] [address]      Dear Kavitha,    I am a clinic care coordinator who works with Ghazal Rubalcava MD with the [facility]. I recently tried to call and was unable to reach you. Below is a description of clinic care coordination and how I can further assist you.       The clinic care coordination team is made up of a registered nurse, , financial resource worker and community health worker who understand the health care system. The goal of clinic care coordination is to help you manage your health and improve access to the health care system. Our team works alongside your provider to assist you in determining your health and social needs. We can help you obtain health care and community resources, providing you with necessary information and education. We can work with you through any barriers and develop a care plan that helps coordinate and strengthen the communication between you and your care team.  Our services are voluntary and are offered without charge to you personally.    Please feel free to contact me with any questions or concerns regarding care coordination and what we can offer.      We are focused on providing you with the highest-quality healthcare experience possible.    Sincerely,      Ryann Vuong RN, BSN, PHN  Primary Care / Care Coordinator   [facility]  [email or web address] [email or web address]   [phone]

## 2024-04-17 NOTE — PROGRESS NOTES
"Patient is here to discuss follow up    BP (!) 146/93 (BP Location: Right arm, Patient Position: Chair, Cuff Size: Adult Regular)   Pulse 112   Ht 5' 2\" (1.575 m)   Wt 166 lb 3.2 oz (75.4 kg)   LMP  (LMP Unknown)   SpO2 96%   BMI 30.40 kg/m      Questions patient would like addressed today are: N/A.    Refills are needed: No    Has homecare services and agency name:  [facility]omen[facility] GRAHAM"

## 2024-04-17 NOTE — PROGRESS NOTES
VASCULAR MEDICINE FOLLOW UP VISIT             A/P:        (I71.21) Aneurysm of ascending aorta without rupture (H24)  (primary encounter diagnosis)    Comment: This is 41 mm in size and is not growing. Her SBP is elevated > 120/70  on amlodipine 10 mg daily. Her LDL-C is 131 on no meds. She does not currently require surgical intervention.     Plan: Annual surveillance of ATAA with CTA chest in 12/2024.  Consider TTE thereafter if no growth. RTC two weeks later.        (R09.89) Abnormal peripheral pulse    Comment: increased popliteal pulses. CTA abd/pelvis with runoff revealed no PA aneurysms but evaluation of the popliteal  arteries was limited secondary to beam hardening artifact from bilateral knee arthroplasties. Popliteal duplex revealed no BLE aneurysmal ds.     Plan: No further f/u required.           (I10) Essential hypertension    Comment: Was not at goal on 11/2/12023 and was having ankle swelling on amlodipine so we stopped that and started metoprolol 50 mg PO BID and losartan 50 mg daily. BP was 130/83, HR was 70 on 12/23/2023. We continued losartan (COZAAR) 50 MG daily without change, and  increased metoprolol tartrate (LOPRESSOR) from 25 to 50 MG BID. She then became hypotensive and stopped metoprolol entirely. On metoprolol to 37.5 mg PO BID her BP is 110/65.    Plan: No change to current BP regimen.         (E78.5) Hyperlipidemia LDL goal <70    Comment: now at goal after having started rosuvastatin (CRESTOR) 20 MG tablet daily.    Plan: Check advanced lipid testing around 2/21/24. RTC two weeks later         (R09.89) Left carotid bruit    Comment: Likely referred sound form prosthetic murmur. Carotid duplex revealed no significant ICA ds bilaterally. US Carotid Bilateral revealed no ICA stenoses.    Plan: No further f/u               (G47.33) JENN (obstructive sleep apnea)    Comment: She has concerns regarding pulmonary htn since her PA size was increased on 11/06 CT PE protocol chest. She has been  dxd with JENN and is not yet using a mouthguard or CPAP mask.    Plan: Treat JENN. Call sleep clinic to discuss.       (I27.20) Pulmonary hypertension (H)    Comment: As above    Plan: Treat JENN. See GRULLON below. That w/u is unrevealing. If wearing CPAP/mouthguard for three months does not improve sxs, refer to pulmonary htn clinic.       (R06.09) Dyspnea on exertion    Comment: Lifestyle limiting. Normal lung exam. Normal EF on TTE. Checked a six minute walk test on 12/05/2023. Results indicate she did not desaturate. She could only complete 72% of the test.. I suspect she is merely deconditioned. Recent coronary angiogram revealed no flow limiting stenoses    Plan: F/U on GRULLON with PCP.    The longitudinal care of plan for Linda was addressed during this visit. Due to added complexity of care, we will continue to support Kavitha Headley  and the subsequent management of these conditions and with ongoing continuity of care for these conditions.     32 minutes total care on today's date.            HPI:     Kavitha Headley is an 78 year old female with a h/o paroxysmal SVT and PACs, hypertension, hyperlipidemia, obesity s/p gastric bypass surgery, chronic diastolic heart failure, substance use disorder, and severe aortic stenosis s/p TAVR with 23 mm Payne madyson 3 valve (8/9/2022), who presents today for evaluation of her known and stable 41 mm ATAA, which has not grown since 2019. She has no known AAA, or iliac aneurysms. Her BP on 11/21/2023 was 153/88 on amlodipine 5 mg daily. She was not at that time on a beta blocker or on an ACE/ARB due to unclear reasons. She was switched to metoprolol and losartan on 11/21/2023. Her LDL was 133 on no lipid lowering meds. She was placed on Crestor 40 mg daily on 11/21/2023    Review Of Systems  Skin: negative  Eyes: negative  Ears/Nose/Throat: negative  Respiratory: No shortness of breath, dyspnea on exertion, cough, or hemoptysis  Cardiovascular: negative  Gastrointestinal:  negative  Genitourinary: negative  Musculoskeletal: negative  Neurologic: negative  Psychiatric: negative  Hematologic/Lymphatic/Immunologic: negative  Endocrine: negative      PAST MEDICAL HISTORY:                  Past Medical History:   Diagnosis Date    Alcohol abuse     Alcohol dependence in remission (H)     Annual physical exam     Anxiety disorder     Ascending aorta dilatation (H24)     Bariatric surgery status 1996?    gastric bypass, [facility] and    Benign hypertension     Chronic insomnia     Chronic pain syndrome     Chronic back and neck pain, chronic pain due to osteoarthritis multiple joints    Coronary artery disease involving native coronary artery of native heart without angina pectoris 10/16/2018    Minimal coronary artery disease on coronary angiogram in 2015.     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    Major depressive disorder, single episode, severe, without mention of psychotic behavior     Mild recurrent major depression (H24)     Mixed hyperlipidemia     Need for prophylactic vaccination and inoculation against influenza     Non-traumatic rhabdomyolysis     Obesity, Class I, BMI 30.0-34.9 (see actual BMI) 09/18/2023    Pelvic relaxation disorder     Surgical intervention for cystocele/rectocele 3,11/2012    Personal history of urinary calculi 06/2006    left ureteral stone,lithotripsy    Psoriasis     Spinal stenosis     Stage III chronic kidney disease (H) 2005       PAST SURGICAL HISTORY:                  Past Surgical History:   Procedure Laterality Date    APPENDECTOMY  3/2004    incidental    ARTHRODESIS TOE(S) Right 1/31/2020    Procedure: RIGHT FIRST METATARSAL PHALANGEAL JOINT ARTHRODESIS;  Surgeon: Steven Reyes MD;   Location: [facility] OR    C MEDIASTINOSCOPY W OR WO BIOPSY  2/2008    Videomediastinoscopy and, for mediastinal adenopathy -reactive lymphoid hyperplasia    CARPAL TUNNEL RELEASE RT/LT  10/2010    Carpometacarpal excisional arthroplasty with a fascial autograft and APL suspension sling (05129). 2. Left thumb metacarpophalangeal joint fusion with autologous bone graft (89204). 3. Left endoscopic carpal tunnel release     CHOLECYSTECTOMY, LAPOROSCOPIC  11/2010    Cholecystectomy, Laparoscopic    COLONOSCOPY N/A 9/8/2016    Procedure: COMBINED COLONOSCOPY, SINGLE OR MULTIPLE BIOPSY/POLYPECTOMY BY BIOPSY;  Surgeon: Moe Barlow MD;  Location:  GI    CV CORONARY ANGIOGRAM N/A 6/20/2022    Procedure: Coronary Angiogram;  Surgeon: Nora Parker MD;  Location:  [facility]ATH [facility]    CV CORONARY ANGIOGRAM N/A 4/15/2024    Procedure: Coronary Angiogram;  Surgeon: Shai Peres MD;  Location:  [facility]    CV PCI N/A 6/20/2022    Procedure: Percutaneous Coronary Intervention;  Surgeon: Nora Parker MD;  Location:  [facility]ATH [facility]    CV RIGHT HEART CATH MEASUREMENTS RECORDED N/A 6/20/2022    Procedure: Right Heart Catheterization;  Surgeon: Nora Parker MD;  Location:  HE[facility]ARD[facility] CATH LAB    CV TRANSCATHETER AORTIC VALVE REPLACEMENT-FEMORAL APPROACH N/A 8/9/2022    Procedure: Transcatheter Aortic Valve Replacement-Femoral Approach;  Surgeon: Nora Parker MD;  Location:  HEART CARD[facility] CATH LAB    CYSTOCELE REPAIR  11/2012    davinci laparoscopic sacrocolpopexy, enterocele repair, lysis of adhesions, placement of retropubic mid urethral sling, cystoscopy    CYSTOSCOPY, LITHOTRIPSY, COMBINED  6/2006    Left extracorporeal shock wave lithotripsy, cystoscopy, left ureteral stent placement.    CYSTOSCOPY, REMOVE STENT(S), COMBINED  7/2006    Cystoscopy, removal of left ureteral stent, retrograde pyelography, flexible and rigid ureteroscopy and holmium laser lithotripsy,  basket removal of stone fragments, ureteral stent placement.     ENDOSCOPIC ULTRASOUND UPPER GASTROINTESTINAL TRACT (GI) N/A 6/12/2017    Procedure: ENDOSCOPIC ULTRASOUND, ESOPHAGOSCOPY / UPPER GASTROINTESTINAL TRACT (GI);  ENDOSCOPIC ULTRASOUND, ESOPHAGOSCOPY / UPPER GASTROINTESTINAL TRACT (GI);  Surgeon: Parth Graham MD;  Location:  GI    HERNIA REPAIR  4/2012    bilateral augmentation mastopexy, ventral hernia repair, and medial thigh liposuction on 04/06/2012.     HYSTERECTOMY VAGINAL, BILATERAL SALPINGO-OOPHERECTOMY, COMBINED  1998    due to myoma and bleeding    JOINT REPLACEMENT, HIP RT/LT  4/2004    right total hip arthroplasty    LAPAROTOMY, LYSIS ADHESIONS, COMBINED  3/2004    lysis adhesions, ventral hernia repair, appendectomy incidentally    LYMPH NODE BIOPSY  4/2008    right axillary, reactive follicular and paracortical hyperplasia.    MAMMOPLASTY AUGMENTATION BILATERAL  4/2012    REPAIR HAMMER TOE Right 1/31/2020    Procedure: WITH SECOND AND THIRD CLAW TOE RECONSTRUCTION;  Surgeon: Steven Reyes MD;  Location:  OR    REVISE RECONSTRUCTED BREAST  6/7/2012    Left breast capsulotomy.     ZZC GASTRIC BYPASS,OBESE<100CM ARIANNA-EN-Y  1996    [facility] REPAIR OF RECTOCELE  3/2012    [facility] TOTAL KNEE ARTHROPLASTY  12/2005    left        CURRENT MEDICATIONS:                  Current Outpatient Medications   Medication Sig Dispense Refill    acetaminophen (TYLENOL) 500 MG tablet Take 1,000 mg by mouth 2 times daily as needed for pain      aspirin (ASA) 81 MG EC tablet Take 81 mg by mouth daily      bisacodyl (DULCOLAX) 5 MG EC tablet Take 5 mg by mouth daily as needed for constipation      cefadroxil (DURICEF) 500 MG capsule Take 1 capsule (500 mg) by mouth 2 times daily 14 capsule 0    citalopram (CELEXA) 20 MG tablet Take 1 tablet (20 mg) by mouth daily 90 tablet 3    folic acid (FOLVITE) 1 MG tablet Take 1 mg by mouth daily      furosemide (LASIX) 20 MG tablet Take 1 tablet (20 mg) by mouth daily  10 tablet 0    gabapentin (NEURONTIN) 300 MG capsule Take 1 capsule (300 mg) by mouth every morning 90 capsule 1    gabapentin (NEURONTIN) 600 MG tablet Take 1 tablet (600 mg) by mouth at bedtime 90 tablet 1    losartan (COZAAR) 50 MG tablet Take 0.5 tablets (25 mg) by mouth daily      metoprolol tartrate (LOPRESSOR) 25 MG tablet Take 1.5 tablets (37.5 mg) by mouth 2 times daily 135 tablet 3    Multiple Vitamins-Minerals (CENTRUM SILVER) per tablet Take 1 tablet by mouth daily      naloxone (NARCAN) 4 MG/0.1ML nasal spray Spray 4 mg into one nostril alternating nostrils once as needed      polyethylene glycol (MIRALAX) 17 GM/Dose powder Take 17 g by mouth 2 times daily as needed for constipation      potassium chloride suleman ER (KLOR-CON M20) 20 MEQ CR tablet Take 1 tablet (20 mEq) by mouth daily 10 tablet 0    rosuvastatin (CRESTOR) 20 MG tablet Take 1 tablet (20 mg) by mouth daily 90 tablet 3    tiZANidine (ZANAFLEX) 4 MG capsule Take 4 mg by mouth 3 times daily as needed for muscle spasms      traZODone (DESYREL) 100 MG tablet TAKE 1 TABLET BY MOUTH AT BEDTIME 30 tablet 5    valACYclovir (VALTREX) 1000 mg tablet Take 1 tablet (1,000 mg) by mouth as needed (onset of cold sore) 4 tablet 3    zolpidem ER (AMBIEN CR) 12.5 MG CR tablet Take 12.5 mg by mouth at bedtime         ALLERGIES:                  Allergies   Allergen Reactions    Bactrim [Sulfamethoxazole-Trimethoprim] Hives    Codeine Nausea and Itching    Morphine Itching     NAUSEA    Morphine And Related Itching     NAUSEA       SOCIAL HISTORY:                  Social History     Socioeconomic History    Marital status:      Spouse name: Mt    Number of children: 4    Years of education: 18    Highest education level: Not on file   Occupational History    Occupation: nurse     Employer: [employer]     Employer: RETIRED   Tobacco Use    Smoking status: Never    Smokeless tobacco: Never   Vaping Use    Vaping status: Never Used   Substance and Sexual  Activity    Alcohol use: Not Currently     Alcohol/week: 63.0 standard drinks of alcohol     Types: 63 Standard drinks or equivalent per week    Drug use: No    Sexual activity: Yes     Partners: Male   Other Topics Concern     Service Not Asked    Blood Transfusions No    Caffeine Concern Yes     Comment: 1-2 cups per day     Occupational Exposure Yes     Comment: blood    Hobby Hazards No    Sleep Concern Yes    Stress Concern Yes    Weight Concern Yes     Comment: gastric  byepass    Special Diet No    Back Care No    Exercise Yes     Comment: walk, swin    Bike Helmet No    Seat Belt Yes    Self-Exams Yes    Parent/sibling w/ CABG, MI or angioplasty before 65F 55M? Not Asked   Social History Narrative    Not on file     Social Determinants of Health     Financial Resource Strain: Low Risk  (12/11/2023)    Financial Resource Strain     Within the past 12 months, have you or your family members you live with been unable to get utilities (heat, electricity) when it was really needed?: No   Food Insecurity: Low Risk  (12/11/2023)    Food Insecurity     Within the past 12 months, did you worry that your food would run out before you got money to buy more?: No     Within the past 12 months, did the food you bought just not last and you didn t have money to get more?: No   Transportation Needs: Low Risk  (12/11/2023)    Transportation Needs     Within the past 12 months, has lack of transportation kept you from medical appointments, getting your medicines, non-medical meetings or appointments, work, or from getting things that you need?: No   Physical Activity: Not on file   Stress: Not on file   Social Connections: Unknown (4/15/2024)    Received from [facility]    Social Connections     Frequency of Communication with Friends and Family: Not on file   Interpersonal Safety: Not on file   Housing Stability: Low Risk  (12/11/2023)    Housing Stability     Do you have housing? :  "Yes     Are you worried about losing your housing?: No       FAMILY HISTORY:                   Family History   Problem Relation Age of Onset    Substance Abuse Father     Cancer Father         throat and lung mets    Diabetes No family hx of     Coronary Artery Disease No family hx of     Cerebrovascular Disease No family hx of              BP (!) 146/93 (BP Location: Right arm, Patient Position: Chair, Cuff Size: Adult Regular)   Pulse 112   Ht 5' 2\" (1.575 m)   Wt 166 lb 3.2 oz (75.4 kg)   LMP  (LMP Unknown)   SpO2 96%   BMI 30.40 kg/m          Physical exam Reveals:    O/P: WNL  HEENT: WNL  NECK: No JVD, thyromegaly, or lymphadenopathy  HEART: RRR, no murmurs, gallops, or rubs  LUNGS: CTA bilaterally without rales, wheezes, or rhonchi  GI: NABS, nondistended, nontender, soft  EXT:without cyanosis, clubbing, or edema  NEURO: nonfocal  : no flank tenderness         Rt femoral:      3 plus palpable    Rt popliteal:     4 plus palpable         Lt femoral:       3 plus palpable   Lt popliteal:      4 plus palpable                  EXAM: CT CHEST PULMONARY EMBOLISM W CONTRAST  LOCATION: [facility]  DATE: 11/6/2023     INDICATION: Short of breath, worsening, elevated D-dimer  COMPARISON: CT 12/17/2021  TECHNIQUE: CT chest pulmonary angiogram during arterial phase injection of IV contrast. Multiplanar reformats and MIP reconstructions were performed. Dose reduction techniques were used.   CONTRAST: 68 mL Isovue 370     FINDINGS:  ANGIOGRAM CHEST: The main pulmonary artery is enlarged measuring up to 3.9 cm in greatest radial dimension which can be associated with changes of pulmonary arterial hypertension. There is no evidence for pulmonary emboli. Thoracic aorta is negative for   dissection, but there is 4.1 cm ascending aortic aneurysmal dilatation seen. There is a TAVER.  Mild reflux of contrast into the IVC which can be associated with mild increased right heart pressure/strain.   "   LUNGS AND PLEURA: There is a benign calcified granuloma in the right upper lobe.     MEDIASTINUM/AXILLAE: Benign calcified right hilar lymph nodes which should be associated with the benign calcified granuloma in the right upper lobe.     CORONARY ARTERY CALCIFICATION: Mild.     UPPER ABDOMEN: Prior postoperative changes of the GI tract with no complications identified. Mild atrophy of the left kidney.     MUSCULOSKELETAL: Bilateral breast implants with no evidence for rupture. Moderate to advanced multilevel degenerative changes, most marked in the lower thoracic spine.                                                                      IMPRESSION:  1.  No PE.     2.  4.1 cm ascending thoracic aortic aneurysm with no associated dissection.     3.  Enlargement of the central main pulmonary artery likely sequelae of pulmonary arterial hypertension.     4.  Since 12/17/2021, the TAVER is new, otherwise there have been no significant changes.           Procedure: CT ANGIOGRAM TAVR   Examination Date: 6/28/2022 4:41 PM   Indication: Severe aortic stenosis, Pre TAVR  Ordering Provider: CRISTI MOTT     TECHNIQUE: ECG gated CT of the heart, aorta and nongated CT of the  chest abdomen pelvis without and with IV contrast Isovue 115 mL was  performed per the HIEU protocol on a Siemens Dual Source Flash scanner  without incident. A noncontrast CT of the chest abdomen and pelvis was  performed followed by contrast-enhanced CTA of the heart in a spiral  gated mode with limited field-of-view followed by gated high pitch  spiral CTA of the chest, abdomen, pelvis at 100/120 kVP to evaluate  the thoracoabdominal aorta and iliac vasculature. Scan protocol was  optimized to minimize radiation exposure. The total radiation exposure  was 960 DLP and 13.44 mSv. Images were reconstructed and analyzed on a  [employer]ot[employer] Workstation.                                                                       IMPRESSION:  1.  No acute aortic  pathology like dissection, intramural hematoma or  contained rupture noted.  2.  Please review detailed radiology report, to follow separately, for  incidental non-cardiovascular findings.     FINDINGS:     1.  Cuspid aortic valve. Aortic valve calcium score is 736. The  ascending aorta is mild calcified, aortic arch is  mild calcified, the  descending thoracic aorta is mild calcified.   2.  The arch vessel branching pattern is normal.   3.  The suprarenal abdominal aorta is mild calcified; infrarenal  abdominal aorta is mild calcified  4.  Widely patent origins of celiac trunk, superior mesenteric artery  and inferior mesenteric artery.  Single bilateral renal arteries with  widely patent origins.   5.  There is no acute aortic pathology, such as dissection, intramural  hematoma, or contained rupture.      MEASUREMENTS:   Representative dimensions of the thoracoabdominal aorta are as  follows:     1. AORTIC ANNULUS MEASUREMENTS:     *  The average aortic annulus diameter is 21.8 mm,   *  Aortic annulus area is 3.75 cm2  *  Aortic annulus perimeter is 72.2 mm  *  The 3 cusp coaxial angle for aortic valve is (Japanese 23 , CRA 6) these   angles will vary depending upon the patient's body position  *  Left main - Annulus distance: 10.8 mm, RCA - Annulus distance: 15.1  mm     2. LVOT MEASUREMENTS:     *  The average LVOT diameter is 20.8 mm  *  LVOT area is 3.42 cm2  *  LVOT perimeter is 68.7 mm  *  LVOT calcification none mm     3. AORTA MEASUREMENTS     1.  The aortic root at the sinuses of Valsalva (L/R/N) 31.4 mm x  27.6  mm 29.4 mm  2.  The sinotubular junction:  27 mm x 26.3 mm  3.  Sinotubular junction height: 18.4 mm x 18.9 mm  4.  Proximal ascending aorta: 42.2 mm x 39.9 mm  5.  Distal abdominal aorta proximal to the bifurcation: 15.3 mm x 14.2  mm     4. ILIOFEMORAL MEASUREMENTS:     RIGHT:  1.  Right common iliac artery: 9.09 mm x 9.96 mm   2.  Right external iliac artery: 6.14 mm x 7.65 mm   3.  Right common  femoral artery: 5.36 mm x 7.83 mm   4.  Tortuosity: mild   5.  Calcification: mild      LEFT:  1.  Left common iliac artery: 7.32 mm x  9.06 mm  2.  Left external iliac artery: 6.17 mm x 7.45 mm  3.  Left common femoral artery: 5.71 mm x 11.48 mm  4.  Tortuosity: mild   5.  Calcification: mild      7. Aortic Root Angle 47.3 degrees     8. Innominate Artery :  14.2 mm X 13mm     9. Left Carotid Artery:  8.12mm X 7.4 mm     OTHER FINDINGS:   1.  Large epicardial fat-pad cannot rule out effusion.  2.  Please review radiology review for incidental non-cardiovascular  findings including lungs, abdominal organs, bone, soft tissue, nodes  to follow separately     JOSSELINE DEL OS SANTOS MD            CTA CHEST WITH CONTRAST   7/12/2019 1:00 PM      HISTORY: Aortic disease, nontraumatic, known or suspected; aortic  valve disease; other nonrheumatic aortic valve disorders.     TECHNIQUE:  CT of the chest was performed following the administration  of 90 mL Isovue-370. Radiation dose for this scan was reduced using  automated exposure control, adjustment of the mA and/or kV according  to patient size, or iterative reconstruction technique.     COMPARISON: CT chest dated 9/10/2010.     FINDINGS:    Vascular examination:  The thoracic aorta at the level of the sinuses  of Valsalva has a maximum diameter of approximately 3.4 cm. The  maximum diameter of the ascending thoracic aorta is approximately 4.1  cm. The diameter at the level of the mid arch is approximately 2.3 cm.  The diameters of the proximal, mid, and distal descending thoracic  aorta are 2.3, 1.9, and 1.9 cm respectively.     The thoracic aortic takeoff vessels are patent without significant  stenoses.     The celiac trunk, superior mesenteric artery, and renal arteries are  patent. Calcified plaque obscures the origin of the left renal artery.     Soft tissues: There are multiple small mediastinal lymph nodes. There  is a calcified granuloma in the right upper lobe. There  is atelectasis  in the left lower lobe just posterior to the left major fissure. There  are postsurgical changes involving the stomach. Patient is status post  a cholecystectomy.                                                                      IMPRESSION: Mild dilatation of the ascending thoracic aorta which has  a maximum diameter of approximately 4.1 cm.     CONOR VANG MD        Component      Latest Ref Rng 9/25/2023  2:42 PM   Cholesterol      <200 mg/dL 259 (H)    Triglycerides      <150 mg/dL 140    HDL Cholesterol      >=50 mg/dL 98    LDL Cholesterol Calculated      <=100 mg/dL 133 (H)    Non HDL Cholesterol      <130 mg/dL 161 (H)       Legend:  (H) High        US CAROTID BILATERAL   12/6/2023 2:54 PM     CLINICAL HISTORY: left carotid bruit. Please comment upon LICA  stenosis if present.; Left carotid bruit  TECHNIQUE: Duplex exam performed utilizing 2D gray-scale imaging,  Doppler interrogation with color-flow and spectral waveform analysis.     COMPARISON: None.     FINDINGS:  RIGHT: There is mild atheromatous plaque. Normal waveforms with no  significant stenosis.     LEFT: There is mild atheromatous plaque. Tortuous left internal  carotid artery with normal waveforms and no significant stenosis.     Both vertebral arteries and subclavian artery waveforms are normal.     VELOCITY CHART:  The following velocities were obtained in the RIGHT carotid system.  Prox CCA:          79/23 cm/s  Mid/distal CCA: 81/24 cm/s  Prox ICA:            68/19 cm/s  Mid ICA:              80/28 cm/s  Distal ICA:          83/35 cm/s  ECA:                   81/7 cm/s  Vertebral:            42/10 cm/s     ICA/CCA: PS 0.98     The following velocities were obtained in the LEFT carotid system.     Prox CCA:          95/20 cm/s  Mid/distal CCA: 84/25 cm/s  Prox ICA:            73/24 cm/s  Mid ICA:              100/35 cm/s  Distal ICA:          111/41 cm/s  ECA:                   29/80 cm/s  Vertebral:            75/29  cm/s     ICA/CCA: PS 1.3                                                                         IMPRESSION:  1. Mild atheromatous plaque in the carotid arteries.  2. No significant carotid stenosis identified.     Evaluation based on velocities and NASCET criteria.     RODRIGO OBREGON MD         CTA ABDOMEN AND PELVIS BILATERAL LEG RUNOFF WITH CONTRAST   12/6/2023  2:44 PM      HISTORY: Known ATAA, evaluate for AAA, mesenteric aneurysms, LE iliac,  femoral, popliteal aneurysms, prominent popliteal pulses bilaterally;  Aneurysm of ascending aorta without rupture (H24).     TECHNIQUE: CTA of the abdomen and pelvis with runoff to the toes with  100 mL Isovue-370 IV. Radiation dose for this scan was reduced using  automated exposure control, adjustment of the mA and/or kV according  to patient size, or iterative reconstruction technique. 3-D images  were created at an independent workstation under concurrent  supervision at the request of the ordering provider.     COMPARISON: CTA abdomen and pelvis 6/28/2022.     FINDINGS:   LUNG BASES: Lung bases are clear. No pleural effusion or pericardial  effusion. Changes of bilateral breast augmentation. Changes of TAVR.     VASCULATURE: Atherosclerotic disease is noted throughout the abdominal  aorta without evidence of aneurysm, dissection or stenosis. The celiac  access, superior mesenteric artery, and inferior mesenteric arteries  are all patent. Bilateral renal arteries are patent.     The bilateral common iliac, internal iliac, and external iliac  arteries are patent.     RIGHT LOWER EXTREMITY: Right common femoral, profunda femoral,  superficial femoral and visualized portions of the popliteal artery  are patent. Evaluation of the popliteal artery is significantly  limited secondary to beam hardening artifact from knee arthroplasty.  Three-vessel runoff.     LEFT LOWER EXTREMITY: Left common femoral, profunda femoral,  superficial femoral and visualized portion of  the popliteal artery are  patent. Three-vessel runoff. Evaluation of the popliteal artery is  significantly limited secondary to beam hardening artifact from knee  arthroplasty.     ABDOMEN: Evaluation of solid organ parenchyma is limited secondary to  contrast bolus timing. The arterial enhanced appearance of the spleen,  adrenal glands, kidneys, liver, and pancreas show no focal  abnormality. Changes of cholecystectomy. Changes of gastric bypass. No  intrahepatic or extrahepatic biliary dilatation. No intraperitoneal  free air or free fluid.     PELVIS: No dilated loops of small or large bowel. Diverticulosis  without evidence of diverticulitis. Prominent groin lymph nodes with  normal fatty leilani are noted. No abdominal or pelvic lymphadenopathy.  Evaluation of the pelvis is somewhat limited secondary to beam  hardening artifact from right hip arthroplasty. Limited evaluation of  the bladder shows no focal abnormality.     MUSCULOSKELETAL: Changes of right hip arthroplasty and bilateral knee  arthroplasty. No suspicious bony lesions.                                                                      IMPRESSION:  1. Patent abdominal aorta and visceral vessels.  2. Patent pelvic inflow vessels.  3. Patent arteries throughout the bilateral lower extremities without  evidence of stenosis or occlusion. Evaluation of the popliteal  arteries are somewhat limited secondary to beam hardening artifact  from bilateral knee arthroplasty.  4. Changes of cholecystectomy.  5. Changes of gastric bypass.  6. Diverticulosis without evidence of diverticulitis.     DO MADHURI MONZON LOWER EXTREMITY ARTERIAL DUPLEX BILATERAL   4/5/2024 3:13 PM     HISTORY: 78-year-old woman with prominent popliteal pulses, request  made for evaluation of popliteal arterial aneurysmal disease. Patient  had previous CTA on December 6, 2023, though popliteal arteries  obscured by metallic artifact due to knee  arthroplasties.     TECHNIQUE: Color Doppler and spectral waveform analysis performed  throughout both popliteal arteries.     FINDINGS:  Right popliteal artery in the above knee segment is 0.5 x 0.5 cm, at  the knee 0.6 x 0.7 cm, and below the knee 0.6 x 0.6 cm. Systolic  velocities range from 84-98 cm/second with triphasic/biphasic  waveforms.     Left popliteal artery is 0.6 x 0.6 cm above the level of the knee, 0.6  x 0.6 cm at and below the level of the knee. Systolic velocities range  from  cm/second with biphasic/triphasic waveforms.                                                                      IMPRESSION: Normal sized and patent bilateral popliteal arteries.      ZEYNEP CASTRO MD     Component      Latest Ref Rng 2/14/2024  1:39 PM   Sodium      135 - 145 mmol/L 135    Potassium      3.4 - 5.3 mmol/L 3.3 (L)    Carbon Dioxide (CO2)      22 - 29 mmol/L 23    Anion Gap      7 - 15 mmol/L 14    Urea Nitrogen      8.0 - 23.0 mg/dL 8.8    Creatinine      0.51 - 0.95 mg/dL 1.08 (H)    GFR Estimate      >60 mL/min/1.73m2 52 (L)    Calcium      8.8 - 10.2 mg/dL 9.1    Chloride      98 - 107 mmol/L 98    Glucose      70 - 99 mg/dL 108 (H)    Alkaline Phosphatase      40 - 150 U/L 71    AST      0 - 45 U/L 43    ALT      0 - 50 U/L 14    Protein Total      6.4 - 8.3 g/dL 7.0    Albumin      3.5 - 5.2 g/dL 3.9    Bilirubin Total      <=1.2 mg/dL 0.4    Total Cholesterol      <=199 mg/dL 219 (H)    Triglycerides      30 - 149 mg/dL 142    HDL Cholesterol      40 - 59 mg/dL 138 (H)    LDL Cholesterol      <=129 mg/dL 53    HDL Size      >=8.9 nm >11.0    VLDL Size      <=46.7 nm 54.9 (H)    LDL Particle Size      >=20.7 nm 20.9    Lge HDL Particle number      >=4.2 umol/L >17.0    HDL Particle Number NMR      >=33.0 umol/L >41.0    Lge VLDL Part number      <=2.7 nmol/L 3.7 (H)    Small LDL Particle number      <=634 nmol/L 320    LDL Particle Number      <=1135 nmol/L 1114    EER LipoFit by NMR See Note     C-Reactive Protein High Sensitivity 0.22    Lipoprotein (a)      <30 mg/dL 8       Legend:  (L) Low  (H) High

## 2024-05-01 NOTE — PATIENT INSTRUCTIONS
Compression stockings when up    Elevate legs as much as possible    Continue to monitor weight and blood pressure at home    Lasix every other day, potassium daily    Recheck labs in two weeks     Keep follow-up with cardiology

## 2024-05-01 NOTE — PROGRESS NOTES
Assessment and Plan:     (Z09) Hospital discharge follow-up  (primary encounter diagnosis)  Comment: admitted with hypotension, NSTEMI, acute on chronic CHF on 4/12/24-4/15/24, see details below, doing okay at home, does have some baseline sob which is frustrating, weight has been stable  Plan: keep follow-up with cardiology in June  Will have her continue lasix 20mg every other day w/low dose potasssium  Repeat labs in two weeks    (I21.4) NSTEMI (non-ST elevated myocardial infarction) (H)  Comment: underwent coronary angiogram on 4/15/2024, only positive finding was mid LAD lesion 10% otherwise normal coronary arteries, treated medically  Plan: continue metoprolol, losartan and lasix as noted above    (I50.32) Chronic diastolic (congestive) heart failure (H)  Comment: weight has been stable, lower extremity edema has improved  Plan: potassium chloride suleman ER (KLOR-CON M10) 10         MEQ CR tablet        Keep cards follow-up    (R60.0) Lower extremity edema  Comment: improved  Plan: furosemide (LASIX) 20 MG tablet, Basic         metabolic panel  (Ca, Cl, CO2, Creat, Gluc, K,         Na, BUN)        Compression stockings, elevate as much as possible    (N18.30) Stage 3 chronic kidney disease, unspecified whether stage 3a or 3b CKD (H)  Comment:   Plan: repeat bmp in 2 weeks     (M48.00) Spinal stenosis, unspecified spinal region  Comment: follows with Dr. Flores and pain clinic  Plan: cont above      Radha Howard PA-C  30 minutes on the day of the encounter doing chart review, history and exam, documentation and further activities as noted above.        Subjective   Linda is a 78 year old, presenting for the following health issues:  Hospital F/U (Patient is here for a Hospital follow up from 4/12/2024 to 4/15/2024.)    HPI     Hospital Follow-up Visit    Problems taking medications regularly:  None  Medication changes since discharge: see note  Problems adhering to non-medication therapy:  None    Summary  of hospitalization:  [facility] discharge summary reviewed  Diagnostic Tests/Treatments reviewed.  Follow up needed: cardiology, neurosurgery  Other Healthcare Providers Involved in Patient s Care:          see below  Update since discharge: improved.   Plan of care communicated with patient       Ms. Headley is here for hospital follow-up  The following is from her discharge summary from 4/12/24-4/15/24 admission:    Kavitha Headley is a 78 year old female with medical history significant for severe aortic stenosis s/p TAVR 08/2022, chronic diastolic CHF, dilated ascending aorta, paroxysmal SVT, HTN, HL, CKD stage III, chronic anemia, morbid obesity s/p gastric bypass, alcoholic liver disease, depression and anxiety, insomnia was sent to the ER from the clinic for evaluation of exertional dyspnea, leg edema and hypotension.  Patient is admitted on 4/12/2024 for further management.         Final discharge diagnoses and hospital course     NSTEMI/unstable angina  Minimal nonocclusive coronary artery disease  Suspected acute on chronic diastolic CHF  Abnormal stress test 4/2/2024  This is a 78-year-old female with multiple comorbidities as above, presented with exertional dyspnea which is genetically well-known, chest heaviness with activity, mildly elevated troponin but serial troponin remained flat    Twelve-lead EKG without ischemic changes.  Chest x-ray without pulmonary edema.  proBNP only 600.  Lactic acid negative.  Stress test as outpatient 4/2 showed mild ischemia in the apical segment of the LV, probably abnormal, and hyperdynamic LV function.     She was started on IV heparin, aspirin.  Cardiology is consulted.  Cardiologist has evaluate the patient recommend coronary angiogram   She remains on IV heparin while was in the hospital.  Initial blood pressure was low, losartan was held, and metoprolol was resumed.  Blood pressure is now stable, did resume PTA metoprolol on 4/13/2020 but continue  to hold losartan at this time  Continue Lasix 20 mg daily.  She has mild lower extremity edema right more than left, no sign of infection or cellulitis  Echocardiogram shows EF of 55 to 60%, no regional wall motion abnormality, bioprosthetic aortic valve present ascending aorta dilatation about 40 mm     -Resume PTA statin.  -Fluid restriction, Daily weight, intake and output.  Patient underwent coronary angiogram on 4/15/2024, only positive finding was mid LAD lesion 10% otherwise normal coronary arteries.  At this time she will continue with aspirin 81 mg daily, metoprolol to 37.5 mg twice daily, Crestor 20 mg daily.  Hypertension is now resolved, resume losartan at lower dose of 25 mg daily on discharge     At the time of discharge feeling well no chest pain shortness breath orthopnea PND palpitation fever chills or cough.  She will be discharged home in stable improved condition as per recommended by cardiology     Severe aortic stenosis s/p TAVR 8/9/2022  Hypotension episode: Resolved  Paroxysmal SVT  Hyperlipidemia  Hypertension  Ascending aortic dilation, 4.1 cm, stable  Resume metoprolol as appropriate stable.  Decrease dose of losartan 25 mg daily from 50 mg daily, will resume Lasix 20 mg daily  -Resume statin     CKD stage III  Creatinine is 1.1 which seems to be her baseline.        Bilateral leg edema: Improved  Patient has trace left lower extremity and 1+ lower EXTR edema.  She has prior bilateral TKA as well.  Ultrasound bilateral lower extremity negative for DVT  Resume low-dose Lasix 2 mg daily now.  Compression stockings applied, lower extremity edema significantly improved at this time.     Liver disease, presumed alcohol related  Limited abdominal ultrasound 11/2023 showed coarsened echotexture suggesting underlying fibrosis.  Patient is s/p cholecystectomy.  Patient with morbid obesity s/p gastric bypass.  Unclear if above represents liver disease due to progression of steatohepatitis related to  "obesity or alcoholic liver disease.  -Platelet counts normal  -Reports she stopped drinking alcohol since February 1, 2024.  Albumin normal on admission     Generalized anxiety disorder  Major depressive disorder  Insomnia  Chronic pain syndrome  Resume PTA gabapentin, trazodone and Ambien when verified.     Neck pain with right upper extremity weakness  Patient does have history of neck pain, was seen at the TCU  MRI of the brain and cervical spine recommended.  MRI of the brain negative for any acute stroke  MRI of the cervical spine reviewed.  Follow-up with spine surgery as outpatient     She has been doing well at home  Her only complaint is neck pain  She follows with Dr. Flores and pain clinic and has upcoming appointment  She would like to stay on lasix as she feels it keeps fluid off her legs   She sees cardiology in mid June    Her breathing is better but she feels sob chronically  Her weight has been stable    Objective    /69 (BP Location: Right arm, Patient Position: Sitting, Cuff Size: Adult Regular)   Pulse 60   Temp 97.3  F (36.3  C) (Temporal)   Resp 20   Ht 1.575 m (5' 2\")   Wt 76 kg (167 lb 9.6 oz)   LMP  (LMP Unknown)   SpO2 99%   BMI 30.65 kg/m    Body mass index is 30.65 kg/m .    Physical Exam     GENERAL: in NAD  RESP: lungs clear to auscultation - no rales, no rhonchi, no wheezes  CV: regular rates and rhythm, systolic murmur  MS: extremities- no gross deformities noted, mild pitting edema bilat lower ext, no open sores           Signed Electronically by: Radha Howard PA-C    "

## 2024-05-16 NOTE — PROGRESS NOTES
S-(situation): RN Clinical Product Navigator chart review.     B-(background): Patient identified by health plan for care management support due to high probability risk of admission.  RN Clinical Product Navigator monitoring for utilization and potential intervention.     A-(assessment): Patient has had one hospitalization 4/12 - 4/15/24 for NSTEMI. Primary Care Coordination RNCC outreached to patient x2 upon discharge, however patient was [facility] 4/16, and 4/17/24. CC intro letter sent. Patient completed OP follow up with Vascular and PCP office for post hospitalization follow up on 5/1/24. Patient will follow up with Cardiology 6/14/24.      R-(recommendations/plan): RN Clinical Product Navigator will continue to monitor for utilization and potential intervention every 4-6 weeks.    Gianna Sloan RN   Clinical Product Navigator   [email or web address]   Office: [phone]

## 2024-06-10 NOTE — RESULT ENCOUNTER NOTE
Hi Linda,     Here are your basic metabolic panel results which are normal aside from mildly low sodium.  You can bring this up by avoiding excessive free water (electrolyte drinks are better).      Please let us know if you have any questions or concerns.    Regards,  Radha Howard PA-C

## 2024-06-14 NOTE — PROGRESS NOTES
Linda is a 78 year old who is being evaluated via a billable telephone visit.    What phone number would you like to be contacted at? [phone]  How would you like to obtain your AVS? Daihart  Originating Location (pt. Location): Home    Distant Location (provider location):  On-site    Assessment and Plan:     (G89.4) Chronic pain syndrome  Comment: would like to increase pm dose of gabapentin to 900mg daily and continue 300mg in am, works well for her pain, denies excessive drowsiness with it  Plan: gabapentin (NEURONTIN) 300 MG capsule        She knows not to stop the medication abruptly    [facility]         Subjective   Linda is a 78 year old, presenting for the following health issues:  Follow Up (To discuss lab results) and Refill Request (Gabapentin)    HPI     Linda is in need of refill of gabapentin  She takes 300mg in the am and 600mg in the evening  She would like option to take 900mg in evening if needed  Requesting Rx for 300mg bid to take in addition to 600mg in evening  The medication works well for her chronic pain and does not cause excessive drowsiness         Objective    Vitals - Patient Reported  Systolic (Patient Reported): 116  Diastolic (Patient Reported): 70      Vitals:  No vitals were obtained today due to virtual visit.    Physical Exam   General: Alert and no distress //Respiratory: No audible wheeze, cough, or shortness of breath // Psychiatric:  Appropriate affect, tone, and pace of words          Phone call duration: 5 minutes  Signed Electronically by: Radha Howard PA-C

## 2024-06-20 NOTE — PROGRESS NOTES
S-(situation): RN Clinical Product Navigator chart review.     B-(background): Patient identified by health plan for care management support due to high probability risk of admission.  RN Clinical Product Navigator monitoring for utilization and potential intervention.     A-(assessment): Patient has had no utilization since last review.     R-(recommendations/plan): RN Clinical Product Navigator will close CPN program at this time.     Gianna Sloan RN   Clinical Product Navigator   [email or web address]   Office: [phone]

## 2024-06-25 ENCOUNTER — TELEPHONE (OUTPATIENT)
Dept: OTHER | Facility: CLINIC | Age: 79
End: 2024-06-25
Payer: COMMERCIAL

## 2024-06-25 NOTE — TELEPHONE ENCOUNTER
[facility]    Who is the name of the provider?:  SAVANNAH ANDRES   What is the location you see this provider at/preferred location?: [place]  Person calling / Facility: [facility]  Phone number:  [phone]  Nurse call back needed:  Yes, if cannot be done today please call     Reason for call:  Jason calling from [facility]. Calling asking for Dr Andres to fill out & sign the online form for the State of [place] in order for them to complete the Death Certificate and cremate the patient. This needs to be done today (Foster stated he also called yesterday). The medical examiner said Dr Andres should sign it, if it should be a different provider please call Foster right away at [phone].    Pharmacy location: n/a  Outside Imaging: n/a   Can we leave a detailed message on this number?  YES     2024, 9:23 AM

## 2024-06-25 NOTE — TELEPHONE ENCOUNTER
Reason for call:  Jason calling from [facility]. Calling asking for Dr Rubalcava to fill out & sign the online form for the State of [place] in order for them to complete the Death Certificate and cremate the patient. This needs to be done today (Foster stated he also called yesterday). The medical examiner thought Dr. Montelongo should sign it but it needs to be the PCP. If we cannot fill the form out today please call Foster back to let him know as soon as possible.     Pharmacy location: n/a  Outside Imaging: n/a   Can we leave a detailed message on this number?  YES

## 2024-06-25 NOTE — TELEPHONE ENCOUNTER
Returned call to [facility] and discussed Dr. Montelongo is not patients PCP and they will need to contact   Dr. Rubalcava for this request as that is patients PCP.    Martha ERICKSON, RN    [facility]  Office: [phone]  Fax: [phone]

## 2025-06-14 NOTE — CONSULTS
Care Management Initial Consult    General Information  Assessment completed with: Patient, VM-chart review,    Type of CM/SW Visit: Initial Assessment    Primary Care Provider verified and updated as needed: Yes   Readmission within the last 30 days: no previous admission in last 30 days         Advance Care Planning: Advance Care Planning Reviewed: present on chart          Communication Assessment  Patient's communication style: spoken language (English or Bilingual)    Hearing Difficulty or Deaf: no   Wear Glasses or Blind: yes    Cognitive  Cognitive/Neuro/Behavioral: WDL  Level of Consciousness: alert  Arousal Level: opens eyes spontaneously  Orientation: disoriented to, time  Mood/Behavior: calm, cooperative  Best Language: 0 - No aphasia  Speech: spontaneous, clear    Living Environment:   People in home: alone     Current living Arrangements: house      Able to return to prior arrangements: yes    Family/Social Support:  Care provided by: self  Provides care for: no one  Marital Status:   Significant Other, Children  Pt reported that her partner of 10 years Sander, her adult daughter Nery and her friend Ibeth are all very supportive and involved.      Description of Support System: Supportive, Involved    Support Assessment: Adequate family and caregiver support    Current Resources:   Patient receiving home care services: No  Community Resources: [facility] Dependency [facility]  Equipment currently used at home: none  Supplies currently used at home: None    Employment/Financial:  Employment Status: retired        Financial Concerns: No concerns identified     Lifestyle & Psychosocial Needs:        Socioeconomic History     Marital status:      Spouse name: Mt     Number of children: 4     Years of education: 18     Highest education level: Not on file   Occupational History     Occupation: nurse     Employer: [employer]     Employer: RETIRED     Tobacco Use     Smoking status: Never Smoker      "Smokeless tobacco: Never Used   Substance and Sexual Activity     Alcohol use: Yes     Alcohol/week: 63.0 standard drinks     Types: 63 Standard drinks or equivalent per week     Comment: pt says she drinks \"6oz\" per day; daughter says pt is minimizing amount     Drug use: No     Sexual activity: Yes     Partners: Male       Functional Status:  Prior to admission patient needed assistance: Pt was ind with ADLs/IADLs PTA     Mental Health Status:  Mental Health Status: Current Concern. Pt reports that she has depression and anxiey.  Pt does not currently have an outpatient mental health therapist or psychiatrist.     Chemical Dependency Status:  Chemical Dependency Status: Current Concern. Pt has a significant history of alcohol abuse and is seeking admission to inpatient CD treatment. See below.           Values/Beliefs:  Spiritual, Cultural Beliefs, [employer] Practices, Values that affect care: no             Additional Information:  Per team rounds, pt would likely be medically ready to discharge tomorrow to inpatient CD treatment.     RACHAEL met with pt at bedside to complete initial consult.  RACHAEL introduced herself to pt and discussed her role in discharge planning.  RACHAEL had pt sign the [facility] for [address] substance abuse program and placed in pt's chart.  Pt has been referred to [address] and is willing to discharge to them.  Pt relayed that her daughter Nery can likely provide transportation or the pt can pay for a cab ride.  SW relayed that she will contact [address] to establish if they will have a bed forher.     RACHAEL called [address] to inquire about placement (contact below).  RACHAEL spoke with Gisselle, they have 1-2 week waiting list.  They will keep the pt on their list and she can call G[facility]elle directly at the [phone] phone number to seek admissions.     RACHAEL paged Dr. Olivo to discuss disposition.     Referral:  [facility]   Phone: [phone]  Fax: [phone]    ADDEND 1330: RACHAEL spoke with " Dr. Olivo - he is not comfortable discharging the pt home alone. Pt is still having some hallucinations, so she is not medically ready today, and may not be ready tomorrow either.   He is going to put a PT/OT consult to see if perhaps the pt would have a skilled need to go to TCU for 1-2 weeks while waiting for admission to [address] - pt can only go to this program because she has Medicare, which will only pay for a hospital based program.  Alternatively, Dr. Olivo would consider a discharge to the pt's daughter's home, if the pt was willing and her daughter was able to accommodate.  SW will follow up.     RACHAEL spoke with pt dtr Nery Murphy (ph: [phone]). Neryleon Gabriel relayed that it could be possible for the pt to come and stay with them while she wants for treatment at [facility], but it is not ideal. Nery would want to make sure that pt is more psychiatrically stable before she would agree to take the pt home. SW agreed and relayed that the pt is not medically ready for discharge, but may be ready over the weekend. RACHAEL relayed that she would be putting the pt on the weekend list for the covering SW to be aware of the case.  Weekend SW should connect with Nery if the medical team feels the pt is ready for discharge over the weekend.       Carli Ureña [facility]  [address]   Ph: [phone]  Pager: [phone]       Female

## (undated) DEVICE — RAD INTRODUCER KIT MICRO 5FRX10CM .018 NITINOL G/W

## (undated) DEVICE — TOTE ANGIO CORP PC15AT SAN32CC83O

## (undated) DEVICE — Device

## (undated) DEVICE — CATH ANGIO INFINITI 3DRC 6FRX100CM 534676T

## (undated) DEVICE — CAST PADDING 4" COTTON WEBRIL UNSTERILE 9084

## (undated) DEVICE — WIRE GUIDE 0.035"X260CM SAFE-T-J EXCHANGE G00517

## (undated) DEVICE — DEFIB PRO-PADZ LVP LQD GEL ADULT 8900-2105-01

## (undated) DEVICE — INTRO GLIDESHEATH SLENDER 6FR 10X45CM 60-1060

## (undated) DEVICE — INTRO SHEATH 4FRX10CM PINNACLE RSS402

## (undated) DEVICE — MANIFOLD KIT ANGIO AUTOMATED 014613

## (undated) DEVICE — SU VICRYL 2-0 X-1 27" UND J459H

## (undated) DEVICE — ESU GROUND PAD UNIVERSAL W/O CORD

## (undated) DEVICE — DRSG XEROFORM 1X8"

## (undated) DEVICE — KIT HAND CONTROL ANGIOTOUCH ACIST 65CM AT-P65

## (undated) DEVICE — TOURNIQUET CUFF 30" STERILE

## (undated) DEVICE — CATH ANGIO INFINITI 3DRC 4FRX100CM 538476

## (undated) DEVICE — CATH ANGIO SUPERTORQUE AL1 6FRX100CM 532-645

## (undated) DEVICE — LINEN TOWEL PACK X5 5464

## (undated) DEVICE — PREP SKIN SCRUB TRAY 4461A

## (undated) DEVICE — INTRO TERUMO 6FRX25CM W/MARKER RSB603

## (undated) DEVICE — SUCTION CANISTER MEDIVAC LINER 3000ML W/LID 65651-530

## (undated) DEVICE — RAD CLOSURE ANGIOSEAL 8FR  610131

## (undated) DEVICE — GOWN XXLG REINFORCED 9071EL

## (undated) DEVICE — INTRODUCER CATH VASC 5FRX10CM  MPIS-501-NT-U-SST

## (undated) DEVICE — WIRE GUIDE 0.035"X150CM EMERALD STR 502542

## (undated) DEVICE — GUIDEWIRE VASC SAFARI2 0.035X275CM H74939406XS1

## (undated) DEVICE — STPL SKIN PROXIMATE 35 WIDE PMW35

## (undated) DEVICE — KIT HAND CONTROL ACIST 016795

## (undated) DEVICE — SPONGE RAY-TEC 4X8" 7318

## (undated) DEVICE — INTRO SHEATH 6FRX10CM PINNACLE RSS602

## (undated) DEVICE — DRAPE POUCH INSTRUMENT 1018

## (undated) DEVICE — CATH DIAG 4FR JL 4.5 538417

## (undated) DEVICE — RADPAD FEM ENTRY ANGIO SHLD YE

## (undated) DEVICE — BLADE KNIFE SURG 15 371115

## (undated) DEVICE — SU PROLENE 3-0 PS-2 18" 8687H

## (undated) DEVICE — PIN GUARD 10-1-001 101001PBX

## (undated) DEVICE — CATH ANGIO INFINITI JL5 4FRX100CM 538422

## (undated) DEVICE — SYSTEM PANNUS RETENTION 4 PAD 2 STRAP CZ-PRS-04

## (undated) DEVICE — DRSG GAUZE 4X4" 3033

## (undated) DEVICE — SOL WATER IRRIG 1000ML BOTTLE 2F7114

## (undated) DEVICE — CAST PADDING 4" UNSTERILE 9044

## (undated) DEVICE — CATH EP PACEL 5FRX110CM 1MM RIGHT HEART CURVE 401763

## (undated) DEVICE — INTRO TERUMO 7FRX25CM W/MARKER RSB703

## (undated) DEVICE — WIRE GUIDE LUNDERQUIST 0.035"X260CM DBL CVD

## (undated) DEVICE — BASIN GALLBLADDER METAL 7" LG STERILE

## (undated) DEVICE — DRSG KERLIX FLUFFS X5

## (undated) DEVICE — DRAPE IOBAN INCISE 23X17" 6650EZ

## (undated) DEVICE — CAST PADDING 4" STERILE 9044S

## (undated) DEVICE — GLOVE PROTEXIS BLUE W/NEU-THERA 9.0  2D73EB90

## (undated) DEVICE — BNDG COBAN 2"X5YDS STERILE LF CAH25LFS

## (undated) DEVICE — BLADE SAW SAGITTAL MICROAIRE 1200-006

## (undated) DEVICE — CATH ANGIO JUDKINS JL4 6FRX100CM INFINITI 534620T

## (undated) DEVICE — CABLE ADAPTER PACING 6FT REMINGTON ADAP 2000

## (undated) DEVICE — INFLATION DEVICE ATRION QL2530

## (undated) DEVICE — DRAPE SHEET REV FOLD 3/4 9349

## (undated) DEVICE — NDL 25GA 1.5" 305127

## (undated) DEVICE — GLOVE PROTEXIS MICRO 8.5  2D73PM85

## (undated) DEVICE — 14FR EDWARDS ESHEATH+ INTRODUCER SET

## (undated) DEVICE — SYR BULB IRRIG 50ML LATEX FREE 0035280

## (undated) DEVICE — BNDG ROLLER GAUZE CONFORM 2"X4YD 41-52

## (undated) DEVICE — INTRO SHEATH 7FRX10CM PINNACLE RSS702

## (undated) DEVICE — PACK EXTREMITY SOP15EXFSD

## (undated) DEVICE — CATH ANGIO INFINITI PIGTAIL 145 6 SH 6FRX110CM  534-652S

## (undated) DEVICE — BNDG ELASTIC 6" DBL LENGTH UNSTERILE 6611-16

## (undated) DEVICE — IMM LIMB ELEVATOR DC40-0203

## (undated) RX ORDER — HEPARIN SODIUM 200 [USP'U]/100ML
INJECTION, SOLUTION INTRAVENOUS
Status: DISPENSED
Start: 2022-08-09

## (undated) RX ORDER — LIDOCAINE HYDROCHLORIDE 10 MG/ML
INJECTION, SOLUTION EPIDURAL; INFILTRATION; INTRACAUDAL; PERINEURAL
Status: DISPENSED
Start: 2022-06-20

## (undated) RX ORDER — FENTANYL CITRATE 0.05 MG/ML
INJECTION, SOLUTION INTRAMUSCULAR; INTRAVENOUS
Status: DISPENSED
Start: 2020-01-31

## (undated) RX ORDER — NITROGLYCERIN 5 MG/ML
VIAL (ML) INTRAVENOUS
Status: DISPENSED
Start: 2022-08-09

## (undated) RX ORDER — FENTANYL CITRATE 0.05 MG/ML
INJECTION, SOLUTION INTRAMUSCULAR; INTRAVENOUS
Status: DISPENSED
Start: 2022-08-09

## (undated) RX ORDER — HEPARIN SODIUM 1000 [USP'U]/ML
INJECTION, SOLUTION INTRAVENOUS; SUBCUTANEOUS
Status: DISPENSED
Start: 2024-01-01

## (undated) RX ORDER — CEFAZOLIN SODIUM/WATER 2 G/20 ML
SYRINGE (ML) INTRAVENOUS
Status: DISPENSED
Start: 2022-08-09

## (undated) RX ORDER — FLUMAZENIL 0.1 MG/ML
INJECTION, SOLUTION INTRAVENOUS
Status: DISPENSED
Start: 2019-07-30

## (undated) RX ORDER — NALOXONE HYDROCHLORIDE 0.4 MG/ML
INJECTION, SOLUTION INTRAMUSCULAR; INTRAVENOUS; SUBCUTANEOUS
Status: DISPENSED
Start: 2019-07-30

## (undated) RX ORDER — FENTANYL CITRATE 50 UG/ML
INJECTION, SOLUTION INTRAMUSCULAR; INTRAVENOUS
Status: DISPENSED
Start: 2024-01-01

## (undated) RX ORDER — LIDOCAINE HYDROCHLORIDE 10 MG/ML
INJECTION, SOLUTION EPIDURAL; INFILTRATION; INTRACAUDAL; PERINEURAL
Status: DISPENSED
Start: 2022-08-09

## (undated) RX ORDER — GLYCOPYRROLATE 0.2 MG/ML
INJECTION, SOLUTION INTRAMUSCULAR; INTRAVENOUS
Status: DISPENSED
Start: 2019-07-30

## (undated) RX ORDER — POTASSIUM CHLORIDE 1500 MG/1
TABLET, EXTENDED RELEASE ORAL
Status: DISPENSED
Start: 2022-06-20

## (undated) RX ORDER — FENTANYL CITRATE 50 UG/ML
INJECTION, SOLUTION INTRAMUSCULAR; INTRAVENOUS
Status: DISPENSED
Start: 2019-07-30

## (undated) RX ORDER — HEPARIN SODIUM 1000 [USP'U]/ML
INJECTION, SOLUTION INTRAVENOUS; SUBCUTANEOUS
Status: DISPENSED
Start: 2022-08-09

## (undated) RX ORDER — HEPARIN SODIUM 200 [USP'U]/100ML
INJECTION, SOLUTION INTRAVENOUS
Status: DISPENSED
Start: 2022-06-20

## (undated) RX ORDER — REGADENOSON 0.08 MG/ML
INJECTION, SOLUTION INTRAVENOUS
Status: DISPENSED
Start: 2024-01-01

## (undated) RX ORDER — LIDOCAINE HYDROCHLORIDE 10 MG/ML
INJECTION, SOLUTION EPIDURAL; INFILTRATION; INTRACAUDAL; PERINEURAL
Status: DISPENSED
Start: 2024-01-01

## (undated) RX ORDER — FENTANYL CITRATE 50 UG/ML
INJECTION, SOLUTION INTRAMUSCULAR; INTRAVENOUS
Status: DISPENSED
Start: 2022-06-20

## (undated) RX ORDER — HYDROMORPHONE HYDROCHLORIDE 2 MG/1
TABLET ORAL
Status: DISPENSED
Start: 2020-01-31

## (undated) RX ORDER — FENTANYL CITRATE 50 UG/ML
INJECTION, SOLUTION INTRAMUSCULAR; INTRAVENOUS
Status: DISPENSED
Start: 2020-01-31

## (undated) RX ORDER — PROPOFOL 10 MG/ML
INJECTION, EMULSION INTRAVENOUS
Status: DISPENSED
Start: 2020-01-31

## (undated) RX ORDER — HEPARIN SODIUM 200 [USP'U]/100ML
INJECTION, SOLUTION INTRAVENOUS
Status: DISPENSED
Start: 2024-01-01

## (undated) RX ORDER — VERAPAMIL HYDROCHLORIDE 2.5 MG/ML
INJECTION, SOLUTION INTRAVENOUS
Status: DISPENSED
Start: 2022-08-09

## (undated) RX ORDER — HEPARIN SODIUM 1000 [USP'U]/ML
INJECTION, SOLUTION INTRAVENOUS; SUBCUTANEOUS
Status: DISPENSED
Start: 2022-06-20